# Patient Record
Sex: MALE | Race: BLACK OR AFRICAN AMERICAN | NOT HISPANIC OR LATINO | Employment: OTHER | URBAN - METROPOLITAN AREA
[De-identification: names, ages, dates, MRNs, and addresses within clinical notes are randomized per-mention and may not be internally consistent; named-entity substitution may affect disease eponyms.]

---

## 2021-04-17 ENCOUNTER — HOSPITAL ENCOUNTER (INPATIENT)
Facility: HOSPITAL | Age: 71
LOS: 5 days | Discharge: HOME HEALTH | DRG: 374 | End: 2021-04-22
Attending: EMERGENCY MEDICINE | Admitting: HOSPITALIST
Payer: MEDICARE

## 2021-04-17 DIAGNOSIS — R10.9 ABDOMINAL PAIN: ICD-10-CM

## 2021-04-17 DIAGNOSIS — R07.9 CHEST PAIN: ICD-10-CM

## 2021-04-17 DIAGNOSIS — K55.069 MESENTERIC VEIN THROMBOSIS: ICD-10-CM

## 2021-04-17 DIAGNOSIS — K63.89 COLONIC MASS: ICD-10-CM

## 2021-04-17 DIAGNOSIS — K56.600 SMALL BOWEL OBSTRUCTION, PARTIAL: ICD-10-CM

## 2021-04-17 DIAGNOSIS — R16.0 LIVER MASSES: ICD-10-CM

## 2021-04-17 LAB
AFP SERPL-MCNC: 2.3 NG/ML (ref 0–8.4)
ALBUMIN SERPL BCP-MCNC: 3.1 G/DL (ref 3.5–5.2)
ALP SERPL-CCNC: 70 U/L (ref 55–135)
ALT SERPL W/O P-5'-P-CCNC: 14 U/L (ref 10–44)
ANION GAP SERPL CALC-SCNC: 12 MMOL/L (ref 8–16)
APTT BLDCRRT: 24.9 SEC (ref 21–32)
APTT BLDCRRT: 54 SEC (ref 21–32)
AST SERPL-CCNC: 32 U/L (ref 10–40)
BACTERIA #/AREA URNS AUTO: NORMAL /HPF
BASOPHILS # BLD AUTO: 0.03 K/UL (ref 0–0.2)
BASOPHILS NFR BLD: 0.4 % (ref 0–1.9)
BILIRUB SERPL-MCNC: 0.7 MG/DL (ref 0.1–1)
BILIRUB UR QL STRIP: NEGATIVE
BUN SERPL-MCNC: 15 MG/DL (ref 8–23)
BUN SERPL-MCNC: 16 MG/DL (ref 6–30)
CALCIUM SERPL-MCNC: 8.9 MG/DL (ref 8.7–10.5)
CANCER AG19-9 SERPL-ACNC: <2 U/ML (ref 2–40)
CEA SERPL-MCNC: 57.8 NG/ML (ref 0–5)
CHLORIDE SERPL-SCNC: 101 MMOL/L (ref 95–110)
CHLORIDE SERPL-SCNC: 99 MMOL/L (ref 95–110)
CLARITY UR REFRACT.AUTO: ABNORMAL
CO2 SERPL-SCNC: 25 MMOL/L (ref 23–29)
COLOR UR AUTO: ABNORMAL
CREAT SERPL-MCNC: 0.9 MG/DL (ref 0.5–1.4)
CREAT SERPL-MCNC: 1 MG/DL (ref 0.5–1.4)
DIFFERENTIAL METHOD: ABNORMAL
EOSINOPHIL # BLD AUTO: 0.1 K/UL (ref 0–0.5)
EOSINOPHIL NFR BLD: 1.2 % (ref 0–8)
ERYTHROCYTE [DISTWIDTH] IN BLOOD BY AUTOMATED COUNT: 16.3 % (ref 11.5–14.5)
EST. GFR  (AFRICAN AMERICAN): >60 ML/MIN/1.73 M^2
EST. GFR  (NON AFRICAN AMERICAN): >60 ML/MIN/1.73 M^2
FERRITIN SERPL-MCNC: 59 NG/ML (ref 20–300)
GLUCOSE SERPL-MCNC: 96 MG/DL (ref 70–110)
GLUCOSE SERPL-MCNC: 96 MG/DL (ref 70–110)
GLUCOSE UR QL STRIP: NEGATIVE
HCT VFR BLD AUTO: 34.2 % (ref 40–54)
HCT VFR BLD CALC: 34 %PCV (ref 36–54)
HGB BLD-MCNC: 9.7 G/DL (ref 14–18)
HGB UR QL STRIP: NEGATIVE
HYALINE CASTS UR QL AUTO: 0 /LPF
IMM GRANULOCYTES # BLD AUTO: 0.02 K/UL (ref 0–0.04)
IMM GRANULOCYTES NFR BLD AUTO: 0.2 % (ref 0–0.5)
INR PPP: 1.1 (ref 0.8–1.2)
INR PPP: 1.1 (ref 0.8–1.2)
INR PPP: 1.2 (ref 0.8–1.2)
IRON SERPL-MCNC: 15 UG/DL (ref 45–160)
KETONES UR QL STRIP: ABNORMAL
LEUKOCYTE ESTERASE UR QL STRIP: NEGATIVE
LIPASE SERPL-CCNC: 109 U/L (ref 4–60)
LYMPHOCYTES # BLD AUTO: 2.7 K/UL (ref 1–4.8)
LYMPHOCYTES NFR BLD: 32.7 % (ref 18–48)
MCH RBC QN AUTO: 21.9 PG (ref 27–31)
MCHC RBC AUTO-ENTMCNC: 28.4 G/DL (ref 32–36)
MCV RBC AUTO: 77 FL (ref 82–98)
MICROSCOPIC COMMENT: NORMAL
MONOCYTES # BLD AUTO: 1 K/UL (ref 0.3–1)
MONOCYTES NFR BLD: 12.2 % (ref 4–15)
NEUTROPHILS # BLD AUTO: 4.4 K/UL (ref 1.8–7.7)
NEUTROPHILS NFR BLD: 53.3 % (ref 38–73)
NITRITE UR QL STRIP: NEGATIVE
NRBC BLD-RTO: 0 /100 WBC
PH UR STRIP: 5 [PH] (ref 5–8)
PLATELET # BLD AUTO: 495 K/UL (ref 150–450)
PMV BLD AUTO: 9.9 FL (ref 9.2–12.9)
POC IONIZED CALCIUM: 1.1 MMOL/L (ref 1.06–1.42)
POC TCO2 (MEASURED): 26 MMOL/L (ref 23–29)
POTASSIUM BLD-SCNC: 4.7 MMOL/L (ref 3.5–5.1)
POTASSIUM SERPL-SCNC: 4.9 MMOL/L (ref 3.5–5.1)
PROT SERPL-MCNC: 7.5 G/DL (ref 6–8.4)
PROT UR QL STRIP: ABNORMAL
PROTHROMBIN TIME: 12.1 SEC (ref 9–12.5)
PROTHROMBIN TIME: 12.4 SEC (ref 9–12.5)
PROTHROMBIN TIME: 13 SEC (ref 9–12.5)
RBC # BLD AUTO: 4.42 M/UL (ref 4.6–6.2)
RBC #/AREA URNS AUTO: 0 /HPF (ref 0–4)
SAMPLE: ABNORMAL
SATURATED IRON: 4 % (ref 20–50)
SODIUM BLD-SCNC: 136 MMOL/L (ref 136–145)
SODIUM SERPL-SCNC: 136 MMOL/L (ref 136–145)
SP GR UR STRIP: 1.03 (ref 1–1.03)
TOTAL IRON BINDING CAPACITY: 351 UG/DL (ref 250–450)
TRANSFERRIN SERPL-MCNC: 237 MG/DL (ref 200–375)
TROPONIN I SERPL DL<=0.01 NG/ML-MCNC: 0.01 NG/ML (ref 0–0.03)
URN SPEC COLLECT METH UR: ABNORMAL
WBC # BLD AUTO: 8.31 K/UL (ref 3.9–12.7)
WBC #/AREA URNS AUTO: 0 /HPF (ref 0–5)

## 2021-04-17 PROCEDURE — 25000003 PHARM REV CODE 250: Performed by: STUDENT IN AN ORGANIZED HEALTH CARE EDUCATION/TRAINING PROGRAM

## 2021-04-17 PROCEDURE — 82105 ALPHA-FETOPROTEIN SERUM: CPT | Performed by: STUDENT IN AN ORGANIZED HEALTH CARE EDUCATION/TRAINING PROGRAM

## 2021-04-17 PROCEDURE — 96361 HYDRATE IV INFUSION ADD-ON: CPT

## 2021-04-17 PROCEDURE — 36415 COLL VENOUS BLD VENIPUNCTURE: CPT | Performed by: HOSPITALIST

## 2021-04-17 PROCEDURE — 93005 ELECTROCARDIOGRAM TRACING: CPT

## 2021-04-17 PROCEDURE — 11000001 HC ACUTE MED/SURG PRIVATE ROOM

## 2021-04-17 PROCEDURE — 86301 IMMUNOASSAY TUMOR CA 19-9: CPT | Performed by: STUDENT IN AN ORGANIZED HEALTH CARE EDUCATION/TRAINING PROGRAM

## 2021-04-17 PROCEDURE — 93010 EKG 12-LEAD: ICD-10-PCS | Mod: ,,, | Performed by: INTERNAL MEDICINE

## 2021-04-17 PROCEDURE — 80053 COMPREHEN METABOLIC PANEL: CPT | Performed by: PHYSICIAN ASSISTANT

## 2021-04-17 PROCEDURE — 99223 1ST HOSP IP/OBS HIGH 75: CPT | Mod: ,,, | Performed by: SURGERY

## 2021-04-17 PROCEDURE — 25000003 PHARM REV CODE 250: Performed by: PHYSICIAN ASSISTANT

## 2021-04-17 PROCEDURE — 93010 ELECTROCARDIOGRAM REPORT: CPT | Mod: ,,, | Performed by: INTERNAL MEDICINE

## 2021-04-17 PROCEDURE — 99284 PR EMERGENCY DEPT VISIT,LEVEL IV: ICD-10-PCS | Mod: CS,,, | Performed by: EMERGENCY MEDICINE

## 2021-04-17 PROCEDURE — 63600175 PHARM REV CODE 636 W HCPCS: Performed by: STUDENT IN AN ORGANIZED HEALTH CARE EDUCATION/TRAINING PROGRAM

## 2021-04-17 PROCEDURE — 81001 URINALYSIS AUTO W/SCOPE: CPT | Performed by: PHYSICIAN ASSISTANT

## 2021-04-17 PROCEDURE — 25500020 PHARM REV CODE 255: Performed by: EMERGENCY MEDICINE

## 2021-04-17 PROCEDURE — 83540 ASSAY OF IRON: CPT | Performed by: STUDENT IN AN ORGANIZED HEALTH CARE EDUCATION/TRAINING PROGRAM

## 2021-04-17 PROCEDURE — 99285 EMERGENCY DEPT VISIT HI MDM: CPT | Mod: 25

## 2021-04-17 PROCEDURE — 99284 EMERGENCY DEPT VISIT MOD MDM: CPT | Mod: CS,,, | Performed by: EMERGENCY MEDICINE

## 2021-04-17 PROCEDURE — 25000003 PHARM REV CODE 250

## 2021-04-17 PROCEDURE — 99223 PR INITIAL HOSPITAL CARE,LEVL III: ICD-10-PCS | Mod: ,,, | Performed by: SURGERY

## 2021-04-17 PROCEDURE — 80047 BASIC METABLC PNL IONIZED CA: CPT

## 2021-04-17 PROCEDURE — 85730 THROMBOPLASTIN TIME PARTIAL: CPT | Performed by: PHYSICIAN ASSISTANT

## 2021-04-17 PROCEDURE — 99223 1ST HOSP IP/OBS HIGH 75: CPT | Mod: ,,, | Performed by: HOSPITALIST

## 2021-04-17 PROCEDURE — 85730 THROMBOPLASTIN TIME PARTIAL: CPT | Mod: 91 | Performed by: HOSPITALIST

## 2021-04-17 PROCEDURE — 86803 HEPATITIS C AB TEST: CPT | Performed by: EMERGENCY MEDICINE

## 2021-04-17 PROCEDURE — 96374 THER/PROPH/DIAG INJ IV PUSH: CPT

## 2021-04-17 PROCEDURE — 85025 COMPLETE CBC W/AUTO DIFF WBC: CPT | Performed by: PHYSICIAN ASSISTANT

## 2021-04-17 PROCEDURE — 83690 ASSAY OF LIPASE: CPT | Performed by: PHYSICIAN ASSISTANT

## 2021-04-17 PROCEDURE — 82728 ASSAY OF FERRITIN: CPT | Performed by: STUDENT IN AN ORGANIZED HEALTH CARE EDUCATION/TRAINING PROGRAM

## 2021-04-17 PROCEDURE — 63600175 PHARM REV CODE 636 W HCPCS: Performed by: PHYSICIAN ASSISTANT

## 2021-04-17 PROCEDURE — 99223 PR INITIAL HOSPITAL CARE,LEVL III: ICD-10-PCS | Mod: ,,, | Performed by: HOSPITALIST

## 2021-04-17 PROCEDURE — 84484 ASSAY OF TROPONIN QUANT: CPT | Performed by: PHYSICIAN ASSISTANT

## 2021-04-17 PROCEDURE — 85610 PROTHROMBIN TIME: CPT | Mod: 91 | Performed by: PHYSICIAN ASSISTANT

## 2021-04-17 PROCEDURE — 82378 CARCINOEMBRYONIC ANTIGEN: CPT | Performed by: STUDENT IN AN ORGANIZED HEALTH CARE EDUCATION/TRAINING PROGRAM

## 2021-04-17 PROCEDURE — 85610 PROTHROMBIN TIME: CPT | Mod: 91 | Performed by: STUDENT IN AN ORGANIZED HEALTH CARE EDUCATION/TRAINING PROGRAM

## 2021-04-17 RX ORDER — MORPHINE SULFATE 2 MG/ML
1 INJECTION, SOLUTION INTRAMUSCULAR; INTRAVENOUS ONCE
Status: COMPLETED | OUTPATIENT
Start: 2021-04-17 | End: 2021-04-17

## 2021-04-17 RX ORDER — SODIUM CHLORIDE 0.9 % (FLUSH) 0.9 %
10 SYRINGE (ML) INJECTION
Status: DISCONTINUED | OUTPATIENT
Start: 2021-04-17 | End: 2021-04-22 | Stop reason: HOSPADM

## 2021-04-17 RX ORDER — ONDANSETRON 8 MG/1
8 TABLET, ORALLY DISINTEGRATING ORAL EVERY 8 HOURS PRN
Status: DISCONTINUED | OUTPATIENT
Start: 2021-04-17 | End: 2021-04-17

## 2021-04-17 RX ORDER — TALC
6 POWDER (GRAM) TOPICAL NIGHTLY PRN
Status: CANCELLED | OUTPATIENT
Start: 2021-04-17

## 2021-04-17 RX ORDER — AMOXICILLIN 250 MG
1 CAPSULE ORAL 2 TIMES DAILY
Status: DISCONTINUED | OUTPATIENT
Start: 2021-04-17 | End: 2021-04-22 | Stop reason: HOSPADM

## 2021-04-17 RX ORDER — HYDRALAZINE HYDROCHLORIDE 25 MG/1
25 TABLET, FILM COATED ORAL EVERY 8 HOURS
Status: DISCONTINUED | OUTPATIENT
Start: 2021-04-17 | End: 2021-04-18

## 2021-04-17 RX ORDER — TALC
6 POWDER (GRAM) TOPICAL NIGHTLY PRN
Status: DISCONTINUED | OUTPATIENT
Start: 2021-04-17 | End: 2021-04-22 | Stop reason: HOSPADM

## 2021-04-17 RX ORDER — PROCHLORPERAZINE MALEATE 5 MG
10 TABLET ORAL EVERY 6 HOURS PRN
Status: DISCONTINUED | OUTPATIENT
Start: 2021-04-17 | End: 2021-04-21

## 2021-04-17 RX ORDER — IBUPROFEN 200 MG
16 TABLET ORAL
Status: DISCONTINUED | OUTPATIENT
Start: 2021-04-17 | End: 2021-04-22 | Stop reason: HOSPADM

## 2021-04-17 RX ORDER — HYDRALAZINE HYDROCHLORIDE 20 MG/ML
10 INJECTION INTRAMUSCULAR; INTRAVENOUS EVERY 6 HOURS PRN
Status: DISCONTINUED | OUTPATIENT
Start: 2021-04-17 | End: 2021-04-22 | Stop reason: HOSPADM

## 2021-04-17 RX ORDER — GLUCAGON 1 MG
1 KIT INJECTION
Status: DISCONTINUED | OUTPATIENT
Start: 2021-04-17 | End: 2021-04-22 | Stop reason: HOSPADM

## 2021-04-17 RX ORDER — MAG HYDROX/ALUMINUM HYD/SIMETH 200-200-20
30 SUSPENSION, ORAL (FINAL DOSE FORM) ORAL
Status: COMPLETED | OUTPATIENT
Start: 2021-04-17 | End: 2021-04-17

## 2021-04-17 RX ORDER — ONDANSETRON 8 MG/1
8 TABLET, ORALLY DISINTEGRATING ORAL EVERY 8 HOURS PRN
Status: DISCONTINUED | OUTPATIENT
Start: 2021-04-17 | End: 2021-04-19

## 2021-04-17 RX ORDER — AMLODIPINE BESYLATE 5 MG/1
5 TABLET ORAL DAILY
Status: DISCONTINUED | OUTPATIENT
Start: 2021-04-17 | End: 2021-04-18

## 2021-04-17 RX ORDER — ONDANSETRON 2 MG/ML
8 INJECTION INTRAMUSCULAR; INTRAVENOUS
Status: COMPLETED | OUTPATIENT
Start: 2021-04-17 | End: 2021-04-17

## 2021-04-17 RX ORDER — SODIUM CHLORIDE 0.9 % (FLUSH) 0.9 %
10 SYRINGE (ML) INJECTION
Status: DISCONTINUED | OUTPATIENT
Start: 2021-04-17 | End: 2021-04-17

## 2021-04-17 RX ORDER — SODIUM CHLORIDE 0.9 % (FLUSH) 0.9 %
10 SYRINGE (ML) INJECTION
Status: CANCELLED | OUTPATIENT
Start: 2021-04-17

## 2021-04-17 RX ORDER — HEPARIN SODIUM,PORCINE/D5W 25000/250
0-40 INTRAVENOUS SOLUTION INTRAVENOUS CONTINUOUS
Status: DISCONTINUED | OUTPATIENT
Start: 2021-04-17 | End: 2021-04-20

## 2021-04-17 RX ORDER — IBUPROFEN 200 MG
24 TABLET ORAL
Status: DISCONTINUED | OUTPATIENT
Start: 2021-04-17 | End: 2021-04-22 | Stop reason: HOSPADM

## 2021-04-17 RX ORDER — AMLODIPINE BESYLATE 5 MG/1
5 TABLET ORAL DAILY
Status: DISCONTINUED | OUTPATIENT
Start: 2021-04-18 | End: 2021-04-17

## 2021-04-17 RX ADMIN — IOHEXOL 75 ML: 350 INJECTION, SOLUTION INTRAVENOUS at 12:04

## 2021-04-17 RX ADMIN — HYDRALAZINE HYDROCHLORIDE 25 MG: 25 TABLET, FILM COATED ORAL at 09:04

## 2021-04-17 RX ADMIN — AMLODIPINE BESYLATE 5 MG: 5 TABLET ORAL at 05:04

## 2021-04-17 RX ADMIN — HEPARIN SODIUM AND DEXTROSE 18 UNITS/KG/HR: 10000; 5 INJECTION INTRAVENOUS at 04:04

## 2021-04-17 RX ADMIN — ALUMINUM HYDROXIDE, MAGNESIUM HYDROXIDE, AND SIMETHICONE 30 ML: 200; 200; 20 SUSPENSION ORAL at 10:04

## 2021-04-17 RX ADMIN — MORPHINE SULFATE 1 MG: 2 INJECTION, SOLUTION INTRAMUSCULAR; INTRAVENOUS at 09:04

## 2021-04-17 RX ADMIN — SODIUM CHLORIDE 1000 ML: 0.9 INJECTION, SOLUTION INTRAVENOUS at 10:04

## 2021-04-17 RX ADMIN — DOCUSATE SODIUM 50MG AND SENNOSIDES 8.6MG 1 TABLET: 8.6; 5 TABLET, FILM COATED ORAL at 09:04

## 2021-04-17 RX ADMIN — SODIUM CHLORIDE 1000 ML: 0.9 INJECTION, SOLUTION INTRAVENOUS at 02:04

## 2021-04-17 RX ADMIN — ONDANSETRON 8 MG: 2 INJECTION INTRAMUSCULAR; INTRAVENOUS at 10:04

## 2021-04-18 PROBLEM — K56.609 LARGE BOWEL OBSTRUCTION: Status: ACTIVE | Noted: 2021-04-18

## 2021-04-18 LAB
ALBUMIN SERPL BCP-MCNC: 2.7 G/DL (ref 3.5–5.2)
ALP SERPL-CCNC: 62 U/L (ref 55–135)
ALT SERPL W/O P-5'-P-CCNC: 13 U/L (ref 10–44)
ANION GAP SERPL CALC-SCNC: 13 MMOL/L (ref 8–16)
APTT BLDCRRT: 41.6 SEC (ref 21–32)
APTT BLDCRRT: 41.6 SEC (ref 21–32)
APTT BLDCRRT: 47 SEC (ref 21–32)
AST SERPL-CCNC: 27 U/L (ref 10–40)
BASOPHILS # BLD AUTO: 0.03 K/UL (ref 0–0.2)
BASOPHILS NFR BLD: 0.4 % (ref 0–1.9)
BILIRUB SERPL-MCNC: 0.5 MG/DL (ref 0.1–1)
BUN SERPL-MCNC: 11 MG/DL (ref 8–23)
CALCIUM SERPL-MCNC: 8.3 MG/DL (ref 8.7–10.5)
CHLORIDE SERPL-SCNC: 102 MMOL/L (ref 95–110)
CHOLEST SERPL-MCNC: 144 MG/DL (ref 120–199)
CHOLEST/HDLC SERPL: 4.2 {RATIO} (ref 2–5)
CO2 SERPL-SCNC: 21 MMOL/L (ref 23–29)
CREAT SERPL-MCNC: 0.8 MG/DL (ref 0.5–1.4)
DIFFERENTIAL METHOD: ABNORMAL
EOSINOPHIL # BLD AUTO: 0.1 K/UL (ref 0–0.5)
EOSINOPHIL NFR BLD: 1.1 % (ref 0–8)
ERYTHROCYTE [DISTWIDTH] IN BLOOD BY AUTOMATED COUNT: 16.7 % (ref 11.5–14.5)
EST. GFR  (AFRICAN AMERICAN): >60 ML/MIN/1.73 M^2
EST. GFR  (NON AFRICAN AMERICAN): >60 ML/MIN/1.73 M^2
GLUCOSE SERPL-MCNC: 80 MG/DL (ref 70–110)
HCT VFR BLD AUTO: 31.7 % (ref 40–54)
HDLC SERPL-MCNC: 34 MG/DL (ref 40–75)
HDLC SERPL: 23.6 % (ref 20–50)
HGB BLD-MCNC: 8.9 G/DL (ref 14–18)
IMM GRANULOCYTES # BLD AUTO: 0.02 K/UL (ref 0–0.04)
IMM GRANULOCYTES NFR BLD AUTO: 0.3 % (ref 0–0.5)
INR PPP: 1.1 (ref 0.8–1.2)
LDLC SERPL CALC-MCNC: 97.6 MG/DL (ref 63–159)
LYMPHOCYTES # BLD AUTO: 2.1 K/UL (ref 1–4.8)
LYMPHOCYTES NFR BLD: 29 % (ref 18–48)
MAGNESIUM SERPL-MCNC: 2 MG/DL (ref 1.6–2.6)
MCH RBC QN AUTO: 21.8 PG (ref 27–31)
MCHC RBC AUTO-ENTMCNC: 28.1 G/DL (ref 32–36)
MCV RBC AUTO: 78 FL (ref 82–98)
MONOCYTES # BLD AUTO: 0.9 K/UL (ref 0.3–1)
MONOCYTES NFR BLD: 12.8 % (ref 4–15)
NEUTROPHILS # BLD AUTO: 4.2 K/UL (ref 1.8–7.7)
NEUTROPHILS NFR BLD: 56.4 % (ref 38–73)
NONHDLC SERPL-MCNC: 110 MG/DL
NRBC BLD-RTO: 0 /100 WBC
PHOSPHATE SERPL-MCNC: 2.8 MG/DL (ref 2.7–4.5)
PLATELET # BLD AUTO: 432 K/UL (ref 150–450)
PMV BLD AUTO: 10.8 FL (ref 9.2–12.9)
POTASSIUM SERPL-SCNC: 3.9 MMOL/L (ref 3.5–5.1)
PROT SERPL-MCNC: 6.4 G/DL (ref 6–8.4)
PROTHROMBIN TIME: 12.2 SEC (ref 9–12.5)
RBC # BLD AUTO: 4.09 M/UL (ref 4.6–6.2)
SODIUM SERPL-SCNC: 136 MMOL/L (ref 136–145)
TRIGL SERPL-MCNC: 62 MG/DL (ref 30–150)
WBC # BLD AUTO: 7.37 K/UL (ref 3.9–12.7)

## 2021-04-18 PROCEDURE — 83735 ASSAY OF MAGNESIUM: CPT | Performed by: STUDENT IN AN ORGANIZED HEALTH CARE EDUCATION/TRAINING PROGRAM

## 2021-04-18 PROCEDURE — 99223 1ST HOSP IP/OBS HIGH 75: CPT | Mod: ,,, | Performed by: INTERNAL MEDICINE

## 2021-04-18 PROCEDURE — 85025 COMPLETE CBC W/AUTO DIFF WBC: CPT | Performed by: PHYSICIAN ASSISTANT

## 2021-04-18 PROCEDURE — 80053 COMPREHEN METABOLIC PANEL: CPT | Performed by: STUDENT IN AN ORGANIZED HEALTH CARE EDUCATION/TRAINING PROGRAM

## 2021-04-18 PROCEDURE — 85610 PROTHROMBIN TIME: CPT | Performed by: STUDENT IN AN ORGANIZED HEALTH CARE EDUCATION/TRAINING PROGRAM

## 2021-04-18 PROCEDURE — 25000003 PHARM REV CODE 250: Performed by: STUDENT IN AN ORGANIZED HEALTH CARE EDUCATION/TRAINING PROGRAM

## 2021-04-18 PROCEDURE — 97161 PT EVAL LOW COMPLEX 20 MIN: CPT

## 2021-04-18 PROCEDURE — 80061 LIPID PANEL: CPT | Performed by: STUDENT IN AN ORGANIZED HEALTH CARE EDUCATION/TRAINING PROGRAM

## 2021-04-18 PROCEDURE — 99232 SBSQ HOSP IP/OBS MODERATE 35: CPT | Mod: GC,,, | Performed by: HOSPITALIST

## 2021-04-18 PROCEDURE — 99233 PR SUBSEQUENT HOSPITAL CARE,LEVL III: ICD-10-PCS | Mod: ,,, | Performed by: COLON & RECTAL SURGERY

## 2021-04-18 PROCEDURE — 63600175 PHARM REV CODE 636 W HCPCS: Performed by: PHYSICIAN ASSISTANT

## 2021-04-18 PROCEDURE — 97165 OT EVAL LOW COMPLEX 30 MIN: CPT

## 2021-04-18 PROCEDURE — 84100 ASSAY OF PHOSPHORUS: CPT | Performed by: STUDENT IN AN ORGANIZED HEALTH CARE EDUCATION/TRAINING PROGRAM

## 2021-04-18 PROCEDURE — 99233 SBSQ HOSP IP/OBS HIGH 50: CPT | Mod: ,,, | Performed by: COLON & RECTAL SURGERY

## 2021-04-18 PROCEDURE — 25000003 PHARM REV CODE 250: Performed by: SURGERY

## 2021-04-18 PROCEDURE — 63600175 PHARM REV CODE 636 W HCPCS: Performed by: HOSPITALIST

## 2021-04-18 PROCEDURE — 97530 THERAPEUTIC ACTIVITIES: CPT

## 2021-04-18 PROCEDURE — 99232 PR SUBSEQUENT HOSPITAL CARE,LEVL II: ICD-10-PCS | Mod: GC,,, | Performed by: HOSPITALIST

## 2021-04-18 PROCEDURE — 99223 PR INITIAL HOSPITAL CARE,LEVL III: ICD-10-PCS | Mod: ,,, | Performed by: INTERNAL MEDICINE

## 2021-04-18 PROCEDURE — 99232 SBSQ HOSP IP/OBS MODERATE 35: CPT | Mod: ,,, | Performed by: SURGERY

## 2021-04-18 PROCEDURE — 99232 PR SUBSEQUENT HOSPITAL CARE,LEVL II: ICD-10-PCS | Mod: ,,, | Performed by: SURGERY

## 2021-04-18 PROCEDURE — 36415 COLL VENOUS BLD VENIPUNCTURE: CPT | Performed by: PHYSICIAN ASSISTANT

## 2021-04-18 PROCEDURE — 36415 COLL VENOUS BLD VENIPUNCTURE: CPT | Performed by: HOSPITALIST

## 2021-04-18 PROCEDURE — 85730 THROMBOPLASTIN TIME PARTIAL: CPT | Performed by: PHYSICIAN ASSISTANT

## 2021-04-18 PROCEDURE — 11000001 HC ACUTE MED/SURG PRIVATE ROOM

## 2021-04-18 PROCEDURE — 85730 THROMBOPLASTIN TIME PARTIAL: CPT | Mod: 91 | Performed by: HOSPITALIST

## 2021-04-18 RX ORDER — MORPHINE SULFATE 2 MG/ML
2 INJECTION, SOLUTION INTRAMUSCULAR; INTRAVENOUS EVERY 4 HOURS PRN
Status: DISCONTINUED | OUTPATIENT
Start: 2021-04-18 | End: 2021-04-22 | Stop reason: HOSPADM

## 2021-04-18 RX ORDER — POLYETHYLENE GLYCOL 3350, SODIUM SULFATE ANHYDROUS, SODIUM BICARBONATE, SODIUM CHLORIDE, POTASSIUM CHLORIDE 236; 22.74; 6.74; 5.86; 2.97 G/4L; G/4L; G/4L; G/4L; G/4L
2000 POWDER, FOR SOLUTION ORAL ONCE
Status: COMPLETED | OUTPATIENT
Start: 2021-04-19 | End: 2021-04-19

## 2021-04-18 RX ORDER — POLYETHYLENE GLYCOL 3350, SODIUM SULFATE ANHYDROUS, SODIUM BICARBONATE, SODIUM CHLORIDE, POTASSIUM CHLORIDE 236; 22.74; 6.74; 5.86; 2.97 G/4L; G/4L; G/4L; G/4L; G/4L
2000 POWDER, FOR SOLUTION ORAL ONCE
Status: COMPLETED | OUTPATIENT
Start: 2021-04-18 | End: 2021-04-18

## 2021-04-18 RX ORDER — AMLODIPINE BESYLATE 10 MG/1
10 TABLET ORAL DAILY
Status: DISCONTINUED | OUTPATIENT
Start: 2021-04-19 | End: 2021-04-22 | Stop reason: HOSPADM

## 2021-04-18 RX ORDER — HYDRALAZINE HYDROCHLORIDE 10 MG/1
10 TABLET, FILM COATED ORAL EVERY 8 HOURS
Status: DISCONTINUED | OUTPATIENT
Start: 2021-04-18 | End: 2021-04-20

## 2021-04-18 RX ADMIN — MORPHINE SULFATE 2 MG: 2 INJECTION, SOLUTION INTRAMUSCULAR; INTRAVENOUS at 10:04

## 2021-04-18 RX ADMIN — HYDRALAZINE HYDROCHLORIDE 25 MG: 25 TABLET, FILM COATED ORAL at 02:04

## 2021-04-18 RX ADMIN — HYDRALAZINE HYDROCHLORIDE 25 MG: 25 TABLET, FILM COATED ORAL at 05:04

## 2021-04-18 RX ADMIN — AMLODIPINE BESYLATE 5 MG: 5 TABLET ORAL at 10:04

## 2021-04-18 RX ADMIN — HEPARIN SODIUM AND DEXTROSE 18 UNITS/KG/HR: 10000; 5 INJECTION INTRAVENOUS at 10:04

## 2021-04-18 RX ADMIN — HYDRALAZINE HYDROCHLORIDE 10 MG: 10 TABLET, FILM COATED ORAL at 09:04

## 2021-04-18 RX ADMIN — MORPHINE SULFATE 2 MG: 2 INJECTION, SOLUTION INTRAMUSCULAR; INTRAVENOUS at 07:04

## 2021-04-18 RX ADMIN — POLYETHYLENE GLYCOL 3350, SODIUM SULFATE ANHYDROUS, SODIUM BICARBONATE, SODIUM CHLORIDE, POTASSIUM CHLORIDE 2000 ML: 236; 22.74; 6.74; 5.86; 2.97 POWDER, FOR SOLUTION ORAL at 03:04

## 2021-04-19 ENCOUNTER — ANESTHESIA EVENT (OUTPATIENT)
Dept: ENDOSCOPY | Facility: HOSPITAL | Age: 71
DRG: 374 | End: 2021-04-19
Payer: MEDICARE

## 2021-04-19 PROBLEM — R11.0 NAUSEA: Status: ACTIVE | Noted: 2021-04-19

## 2021-04-19 LAB
ALBUMIN SERPL BCP-MCNC: 2.6 G/DL (ref 3.5–5.2)
ALP SERPL-CCNC: 57 U/L (ref 55–135)
ALT SERPL W/O P-5'-P-CCNC: 10 U/L (ref 10–44)
ANION GAP SERPL CALC-SCNC: 13 MMOL/L (ref 8–16)
ANISOCYTOSIS BLD QL SMEAR: SLIGHT
APTT BLDCRRT: 43.3 SEC (ref 21–32)
AST SERPL-CCNC: 22 U/L (ref 10–40)
BASOPHILS # BLD AUTO: 0.03 K/UL (ref 0–0.2)
BASOPHILS NFR BLD: 0.4 % (ref 0–1.9)
BILIRUB SERPL-MCNC: 0.5 MG/DL (ref 0.1–1)
BUN SERPL-MCNC: 10 MG/DL (ref 8–23)
CALCIUM SERPL-MCNC: 8.4 MG/DL (ref 8.7–10.5)
CHLORIDE SERPL-SCNC: 101 MMOL/L (ref 95–110)
CO2 SERPL-SCNC: 20 MMOL/L (ref 23–29)
CREAT SERPL-MCNC: 0.7 MG/DL (ref 0.5–1.4)
DIFFERENTIAL METHOD: ABNORMAL
EOSINOPHIL # BLD AUTO: 0.1 K/UL (ref 0–0.5)
EOSINOPHIL NFR BLD: 1.1 % (ref 0–8)
ERYTHROCYTE [DISTWIDTH] IN BLOOD BY AUTOMATED COUNT: 16.5 % (ref 11.5–14.5)
EST. GFR  (AFRICAN AMERICAN): >60 ML/MIN/1.73 M^2
EST. GFR  (NON AFRICAN AMERICAN): >60 ML/MIN/1.73 M^2
GLUCOSE SERPL-MCNC: 78 MG/DL (ref 70–110)
HCT VFR BLD AUTO: 30.5 % (ref 40–54)
HCV AB SERPL QL IA: NEGATIVE
HGB BLD-MCNC: 8.9 G/DL (ref 14–18)
IMM GRANULOCYTES # BLD AUTO: 0.02 K/UL (ref 0–0.04)
IMM GRANULOCYTES NFR BLD AUTO: 0.3 % (ref 0–0.5)
INR PPP: 1.2 (ref 0.8–1.2)
LYMPHOCYTES # BLD AUTO: 1.6 K/UL (ref 1–4.8)
LYMPHOCYTES NFR BLD: 22.4 % (ref 18–48)
MAGNESIUM SERPL-MCNC: 1.9 MG/DL (ref 1.6–2.6)
MCH RBC QN AUTO: 22.2 PG (ref 27–31)
MCHC RBC AUTO-ENTMCNC: 29.2 G/DL (ref 32–36)
MCV RBC AUTO: 76 FL (ref 82–98)
MONOCYTES # BLD AUTO: 1 K/UL (ref 0.3–1)
MONOCYTES NFR BLD: 14 % (ref 4–15)
NEUTROPHILS # BLD AUTO: 4.5 K/UL (ref 1.8–7.7)
NEUTROPHILS NFR BLD: 61.8 % (ref 38–73)
NRBC BLD-RTO: 0 /100 WBC
OVALOCYTES BLD QL SMEAR: ABNORMAL
PHOSPHATE SERPL-MCNC: 2.6 MG/DL (ref 2.7–4.5)
PLATELET # BLD AUTO: 442 K/UL (ref 150–450)
PLATELET BLD QL SMEAR: ABNORMAL
PMV BLD AUTO: 10.5 FL (ref 9.2–12.9)
POIKILOCYTOSIS BLD QL SMEAR: SLIGHT
POTASSIUM SERPL-SCNC: 3.8 MMOL/L (ref 3.5–5.1)
PROT SERPL-MCNC: 6.2 G/DL (ref 6–8.4)
PROTHROMBIN TIME: 12.5 SEC (ref 9–12.5)
RBC # BLD AUTO: 4.01 M/UL (ref 4.6–6.2)
SARS-COV-2 RNA RESP QL NAA+PROBE: NOT DETECTED
SODIUM SERPL-SCNC: 134 MMOL/L (ref 136–145)
WBC # BLD AUTO: 7.2 K/UL (ref 3.9–12.7)

## 2021-04-19 PROCEDURE — 25500020 PHARM REV CODE 255: Performed by: HOSPITALIST

## 2021-04-19 PROCEDURE — 99233 PR SUBSEQUENT HOSPITAL CARE,LEVL III: ICD-10-PCS | Mod: GC,,, | Performed by: HOSPITALIST

## 2021-04-19 PROCEDURE — U0005 INFEC AGEN DETEC AMPLI PROBE: HCPCS | Performed by: COLON & RECTAL SURGERY

## 2021-04-19 PROCEDURE — 85610 PROTHROMBIN TIME: CPT | Performed by: STUDENT IN AN ORGANIZED HEALTH CARE EDUCATION/TRAINING PROGRAM

## 2021-04-19 PROCEDURE — 11000001 HC ACUTE MED/SURG PRIVATE ROOM

## 2021-04-19 PROCEDURE — U0003 INFECTIOUS AGENT DETECTION BY NUCLEIC ACID (DNA OR RNA); SEVERE ACUTE RESPIRATORY SYNDROME CORONAVIRUS 2 (SARS-COV-2) (CORONAVIRUS DISEASE [COVID-19]), AMPLIFIED PROBE TECHNIQUE, MAKING USE OF HIGH THROUGHPUT TECHNOLOGIES AS DESCRIBED BY CMS-2020-01-R: HCPCS | Performed by: COLON & RECTAL SURGERY

## 2021-04-19 PROCEDURE — 99222 PR INITIAL HOSPITAL CARE,LEVL II: ICD-10-PCS | Mod: ,,, | Performed by: RADIOLOGY

## 2021-04-19 PROCEDURE — 63600175 PHARM REV CODE 636 W HCPCS: Performed by: PHYSICIAN ASSISTANT

## 2021-04-19 PROCEDURE — 99233 SBSQ HOSP IP/OBS HIGH 50: CPT | Mod: GC,,, | Performed by: HOSPITALIST

## 2021-04-19 PROCEDURE — 84100 ASSAY OF PHOSPHORUS: CPT | Performed by: STUDENT IN AN ORGANIZED HEALTH CARE EDUCATION/TRAINING PROGRAM

## 2021-04-19 PROCEDURE — 83735 ASSAY OF MAGNESIUM: CPT | Performed by: STUDENT IN AN ORGANIZED HEALTH CARE EDUCATION/TRAINING PROGRAM

## 2021-04-19 PROCEDURE — 99222 1ST HOSP IP/OBS MODERATE 55: CPT | Mod: ,,, | Performed by: RADIOLOGY

## 2021-04-19 PROCEDURE — 36415 COLL VENOUS BLD VENIPUNCTURE: CPT | Performed by: PHYSICIAN ASSISTANT

## 2021-04-19 PROCEDURE — 25000003 PHARM REV CODE 250: Performed by: SURGERY

## 2021-04-19 PROCEDURE — 25000003 PHARM REV CODE 250: Performed by: STUDENT IN AN ORGANIZED HEALTH CARE EDUCATION/TRAINING PROGRAM

## 2021-04-19 PROCEDURE — 85730 THROMBOPLASTIN TIME PARTIAL: CPT | Performed by: PHYSICIAN ASSISTANT

## 2021-04-19 PROCEDURE — 63600175 PHARM REV CODE 636 W HCPCS: Performed by: HOSPITALIST

## 2021-04-19 PROCEDURE — 80053 COMPREHEN METABOLIC PANEL: CPT | Performed by: STUDENT IN AN ORGANIZED HEALTH CARE EDUCATION/TRAINING PROGRAM

## 2021-04-19 PROCEDURE — 85025 COMPLETE CBC W/AUTO DIFF WBC: CPT | Performed by: PHYSICIAN ASSISTANT

## 2021-04-19 RX ORDER — ONDANSETRON 2 MG/ML
4 INJECTION INTRAMUSCULAR; INTRAVENOUS EVERY 6 HOURS
Status: DISCONTINUED | OUTPATIENT
Start: 2021-04-19 | End: 2021-04-21

## 2021-04-19 RX ADMIN — DOCUSATE SODIUM 50MG AND SENNOSIDES 8.6MG 1 TABLET: 8.6; 5 TABLET, FILM COATED ORAL at 10:04

## 2021-04-19 RX ADMIN — HEPARIN SODIUM AND DEXTROSE 18 UNITS/KG/HR: 10000; 5 INJECTION INTRAVENOUS at 11:04

## 2021-04-19 RX ADMIN — MELATONIN TAB 3 MG 6 MG: 3 TAB at 09:04

## 2021-04-19 RX ADMIN — ONDANSETRON 4 MG: 2 INJECTION INTRAMUSCULAR; INTRAVENOUS at 03:04

## 2021-04-19 RX ADMIN — HYDRALAZINE HYDROCHLORIDE 10 MG: 10 TABLET, FILM COATED ORAL at 09:04

## 2021-04-19 RX ADMIN — MORPHINE SULFATE 2 MG: 2 INJECTION, SOLUTION INTRAMUSCULAR; INTRAVENOUS at 03:04

## 2021-04-19 RX ADMIN — AMLODIPINE BESYLATE 10 MG: 10 TABLET ORAL at 10:04

## 2021-04-19 RX ADMIN — ONDANSETRON 4 MG: 2 INJECTION INTRAMUSCULAR; INTRAVENOUS at 06:04

## 2021-04-19 RX ADMIN — HYDRALAZINE HYDROCHLORIDE 10 MG: 10 TABLET, FILM COATED ORAL at 02:04

## 2021-04-19 RX ADMIN — ONDANSETRON 4 MG: 2 INJECTION INTRAMUSCULAR; INTRAVENOUS at 11:04

## 2021-04-19 RX ADMIN — POLYETHYLENE GLYCOL 3350, SODIUM SULFATE ANHYDROUS, SODIUM BICARBONATE, SODIUM CHLORIDE, POTASSIUM CHLORIDE 2000 ML: 236; 22.74; 6.74; 5.86; 2.97 POWDER, FOR SOLUTION ORAL at 12:04

## 2021-04-19 RX ADMIN — HYDRALAZINE HYDROCHLORIDE 10 MG: 10 TABLET, FILM COATED ORAL at 05:04

## 2021-04-19 RX ADMIN — DIATRIZOATE MEGLUMINE AND DIATRIZOATE SODIUM 360 ML: 660; 100 LIQUID ORAL; RECTAL at 04:04

## 2021-04-19 RX ADMIN — DOCUSATE SODIUM 50MG AND SENNOSIDES 8.6MG 1 TABLET: 8.6; 5 TABLET, FILM COATED ORAL at 09:04

## 2021-04-19 RX ADMIN — MORPHINE SULFATE 2 MG: 2 INJECTION, SOLUTION INTRAMUSCULAR; INTRAVENOUS at 10:04

## 2021-04-20 ENCOUNTER — ANESTHESIA (OUTPATIENT)
Dept: ENDOSCOPY | Facility: HOSPITAL | Age: 71
DRG: 374 | End: 2021-04-20
Payer: MEDICARE

## 2021-04-20 LAB
ALBUMIN SERPL BCP-MCNC: 3.1 G/DL (ref 3.5–5.2)
ALP SERPL-CCNC: 60 U/L (ref 55–135)
ALT SERPL W/O P-5'-P-CCNC: 10 U/L (ref 10–44)
ANION GAP SERPL CALC-SCNC: 11 MMOL/L (ref 8–16)
ANISOCYTOSIS BLD QL SMEAR: SLIGHT
ANISOCYTOSIS BLD QL SMEAR: SLIGHT
APTT BLDCRRT: 27.1 SEC (ref 21–32)
APTT BLDCRRT: 27.4 SEC (ref 21–32)
APTT BLDCRRT: 33.9 SEC (ref 21–32)
AST SERPL-CCNC: 23 U/L (ref 10–40)
BASOPHILS # BLD AUTO: 0.02 K/UL (ref 0–0.2)
BASOPHILS # BLD AUTO: 0.03 K/UL (ref 0–0.2)
BASOPHILS NFR BLD: 0.2 % (ref 0–1.9)
BASOPHILS NFR BLD: 0.4 % (ref 0–1.9)
BILIRUB SERPL-MCNC: 0.6 MG/DL (ref 0.1–1)
BUN SERPL-MCNC: 14 MG/DL (ref 8–23)
BURR CELLS BLD QL SMEAR: ABNORMAL
CALCIUM SERPL-MCNC: 8.7 MG/DL (ref 8.7–10.5)
CHLORIDE SERPL-SCNC: 97 MMOL/L (ref 95–110)
CO2 SERPL-SCNC: 27 MMOL/L (ref 23–29)
CREAT SERPL-MCNC: 0.9 MG/DL (ref 0.5–1.4)
DIFFERENTIAL METHOD: ABNORMAL
DIFFERENTIAL METHOD: ABNORMAL
EOSINOPHIL # BLD AUTO: 0 K/UL (ref 0–0.5)
EOSINOPHIL # BLD AUTO: 0.1 K/UL (ref 0–0.5)
EOSINOPHIL NFR BLD: 0.2 % (ref 0–8)
EOSINOPHIL NFR BLD: 0.9 % (ref 0–8)
ERYTHROCYTE [DISTWIDTH] IN BLOOD BY AUTOMATED COUNT: 16.6 % (ref 11.5–14.5)
ERYTHROCYTE [DISTWIDTH] IN BLOOD BY AUTOMATED COUNT: 17 % (ref 11.5–14.5)
EST. GFR  (AFRICAN AMERICAN): >60 ML/MIN/1.73 M^2
EST. GFR  (NON AFRICAN AMERICAN): >60 ML/MIN/1.73 M^2
GLUCOSE SERPL-MCNC: 113 MG/DL (ref 70–110)
HCT VFR BLD AUTO: 31 % (ref 40–54)
HCT VFR BLD AUTO: 34 % (ref 40–54)
HGB BLD-MCNC: 9.1 G/DL (ref 14–18)
HGB BLD-MCNC: 9.7 G/DL (ref 14–18)
HYPOCHROMIA BLD QL SMEAR: ABNORMAL
HYPOCHROMIA BLD QL SMEAR: ABNORMAL
IMM GRANULOCYTES # BLD AUTO: 0.02 K/UL (ref 0–0.04)
IMM GRANULOCYTES # BLD AUTO: 0.06 K/UL (ref 0–0.04)
IMM GRANULOCYTES NFR BLD AUTO: 0.3 % (ref 0–0.5)
IMM GRANULOCYTES NFR BLD AUTO: 0.6 % (ref 0–0.5)
INR PPP: 1.1 (ref 0.8–1.2)
INR PPP: 1.2 (ref 0.8–1.2)
LYMPHOCYTES # BLD AUTO: 1.7 K/UL (ref 1–4.8)
LYMPHOCYTES # BLD AUTO: 1.9 K/UL (ref 1–4.8)
LYMPHOCYTES NFR BLD: 18.5 % (ref 18–48)
LYMPHOCYTES NFR BLD: 21.8 % (ref 18–48)
MAGNESIUM SERPL-MCNC: 2.1 MG/DL (ref 1.6–2.6)
MCH RBC QN AUTO: 21.7 PG (ref 27–31)
MCH RBC QN AUTO: 22.2 PG (ref 27–31)
MCHC RBC AUTO-ENTMCNC: 28.5 G/DL (ref 32–36)
MCHC RBC AUTO-ENTMCNC: 29.4 G/DL (ref 32–36)
MCV RBC AUTO: 76 FL (ref 82–98)
MCV RBC AUTO: 76 FL (ref 82–98)
MONOCYTES # BLD AUTO: 0.4 K/UL (ref 0.3–1)
MONOCYTES # BLD AUTO: 1.2 K/UL (ref 0.3–1)
MONOCYTES NFR BLD: 12.2 % (ref 4–15)
MONOCYTES NFR BLD: 4.7 % (ref 4–15)
NEUTROPHILS # BLD AUTO: 5.6 K/UL (ref 1.8–7.7)
NEUTROPHILS # BLD AUTO: 6.9 K/UL (ref 1.8–7.7)
NEUTROPHILS NFR BLD: 68.3 % (ref 38–73)
NEUTROPHILS NFR BLD: 71.9 % (ref 38–73)
NRBC BLD-RTO: 0 /100 WBC
NRBC BLD-RTO: 0 /100 WBC
OVALOCYTES BLD QL SMEAR: ABNORMAL
OVALOCYTES BLD QL SMEAR: ABNORMAL
PHOSPHATE SERPL-MCNC: 2.6 MG/DL (ref 2.7–4.5)
PLATELET # BLD AUTO: 441 K/UL (ref 150–450)
PLATELET # BLD AUTO: 507 K/UL (ref 150–450)
PLATELET BLD QL SMEAR: ABNORMAL
PLATELET BLD QL SMEAR: ABNORMAL
PMV BLD AUTO: 10.1 FL (ref 9.2–12.9)
PMV BLD AUTO: 11.1 FL (ref 9.2–12.9)
POIKILOCYTOSIS BLD QL SMEAR: SLIGHT
POIKILOCYTOSIS BLD QL SMEAR: SLIGHT
POTASSIUM SERPL-SCNC: 3.8 MMOL/L (ref 3.5–5.1)
PROT SERPL-MCNC: 6.9 G/DL (ref 6–8.4)
PROTHROMBIN TIME: 12.4 SEC (ref 9–12.5)
PROTHROMBIN TIME: 13 SEC (ref 9–12.5)
RBC # BLD AUTO: 4.1 M/UL (ref 4.6–6.2)
RBC # BLD AUTO: 4.48 M/UL (ref 4.6–6.2)
SCHISTOCYTES BLD QL SMEAR: ABNORMAL
SODIUM SERPL-SCNC: 135 MMOL/L (ref 136–145)
WBC # BLD AUTO: 10.09 K/UL (ref 3.9–12.7)
WBC # BLD AUTO: 7.81 K/UL (ref 3.9–12.7)

## 2021-04-20 PROCEDURE — D9220A PRA ANESTHESIA: ICD-10-PCS | Mod: CRNA,,, | Performed by: NURSE ANESTHETIST, CERTIFIED REGISTERED

## 2021-04-20 PROCEDURE — 88360 TUMOR IMMUNOHISTOCHEM/MANUAL: CPT | Performed by: PATHOLOGY

## 2021-04-20 PROCEDURE — 85730 THROMBOPLASTIN TIME PARTIAL: CPT | Performed by: PHYSICIAN ASSISTANT

## 2021-04-20 PROCEDURE — 27202304 HC CANNULA, ERCP: Performed by: COLON & RECTAL SURGERY

## 2021-04-20 PROCEDURE — 81210 BRAF GENE: CPT | Performed by: PATHOLOGY

## 2021-04-20 PROCEDURE — C1769 GUIDE WIRE: HCPCS | Performed by: COLON & RECTAL SURGERY

## 2021-04-20 PROCEDURE — 85025 COMPLETE CBC W/AUTO DIFF WBC: CPT | Performed by: PHYSICIAN ASSISTANT

## 2021-04-20 PROCEDURE — 25000003 PHARM REV CODE 250: Performed by: STUDENT IN AN ORGANIZED HEALTH CARE EDUCATION/TRAINING PROGRAM

## 2021-04-20 PROCEDURE — 99233 PR SUBSEQUENT HOSPITAL CARE,LEVL III: ICD-10-PCS | Mod: GC,,, | Performed by: HOSPITALIST

## 2021-04-20 PROCEDURE — 63600175 PHARM REV CODE 636 W HCPCS: Performed by: NURSE ANESTHETIST, CERTIFIED REGISTERED

## 2021-04-20 PROCEDURE — 85730 THROMBOPLASTIN TIME PARTIAL: CPT | Mod: 91 | Performed by: STUDENT IN AN ORGANIZED HEALTH CARE EDUCATION/TRAINING PROGRAM

## 2021-04-20 PROCEDURE — 11000001 HC ACUTE MED/SURG PRIVATE ROOM

## 2021-04-20 PROCEDURE — 88341 PR IHC OR ICC EACH ADD'L SINGLE ANTIBODY  STAINPR: ICD-10-PCS | Mod: 26,,, | Performed by: PATHOLOGY

## 2021-04-20 PROCEDURE — 85610 PROTHROMBIN TIME: CPT | Mod: 91 | Performed by: HOSPITALIST

## 2021-04-20 PROCEDURE — 81311 NRAS GENE VARIANTS EXON 2&3: CPT | Performed by: PATHOLOGY

## 2021-04-20 PROCEDURE — 88341 IMHCHEM/IMCYTCHM EA ADD ANTB: CPT | Mod: 26,,, | Performed by: PATHOLOGY

## 2021-04-20 PROCEDURE — 99233 SBSQ HOSP IP/OBS HIGH 50: CPT | Mod: GC,,, | Performed by: HOSPITALIST

## 2021-04-20 PROCEDURE — 27201012 HC FORCEPS, HOT/COLD, DISP: Performed by: COLON & RECTAL SURGERY

## 2021-04-20 PROCEDURE — 63600175 PHARM REV CODE 636 W HCPCS: Performed by: HOSPITALIST

## 2021-04-20 PROCEDURE — 88305 TISSUE EXAM BY PATHOLOGIST: CPT | Mod: 26,,, | Performed by: PATHOLOGY

## 2021-04-20 PROCEDURE — 83735 ASSAY OF MAGNESIUM: CPT | Performed by: STUDENT IN AN ORGANIZED HEALTH CARE EDUCATION/TRAINING PROGRAM

## 2021-04-20 PROCEDURE — 88305 TISSUE EXAM BY PATHOLOGIST: ICD-10-PCS | Mod: 26,,, | Performed by: PATHOLOGY

## 2021-04-20 PROCEDURE — 37000009 HC ANESTHESIA EA ADD 15 MINS: Performed by: COLON & RECTAL SURGERY

## 2021-04-20 PROCEDURE — 36415 COLL VENOUS BLD VENIPUNCTURE: CPT | Performed by: STUDENT IN AN ORGANIZED HEALTH CARE EDUCATION/TRAINING PROGRAM

## 2021-04-20 PROCEDURE — 88342 IMHCHEM/IMCYTCHM 1ST ANTB: CPT | Mod: 26,,, | Performed by: PATHOLOGY

## 2021-04-20 PROCEDURE — 88342 IMHCHEM/IMCYTCHM 1ST ANTB: CPT | Performed by: PATHOLOGY

## 2021-04-20 PROCEDURE — 25000003 PHARM REV CODE 250: Performed by: ANESTHESIOLOGY

## 2021-04-20 PROCEDURE — 80053 COMPREHEN METABOLIC PANEL: CPT | Performed by: STUDENT IN AN ORGANIZED HEALTH CARE EDUCATION/TRAINING PROGRAM

## 2021-04-20 PROCEDURE — 37000008 HC ANESTHESIA 1ST 15 MINUTES: Performed by: COLON & RECTAL SURGERY

## 2021-04-20 PROCEDURE — 36415 COLL VENOUS BLD VENIPUNCTURE: CPT | Performed by: PHYSICIAN ASSISTANT

## 2021-04-20 PROCEDURE — 81403 MOPATH PROCEDURE LEVEL 4: CPT | Performed by: PATHOLOGY

## 2021-04-20 PROCEDURE — 36415 COLL VENOUS BLD VENIPUNCTURE: CPT | Performed by: HOSPITALIST

## 2021-04-20 PROCEDURE — D9220A PRA ANESTHESIA: Mod: ANES,,, | Performed by: ANESTHESIOLOGY

## 2021-04-20 PROCEDURE — C1874 STENT, COATED/COV W/DEL SYS: HCPCS | Performed by: COLON & RECTAL SURGERY

## 2021-04-20 PROCEDURE — 84100 ASSAY OF PHOSPHORUS: CPT | Performed by: STUDENT IN AN ORGANIZED HEALTH CARE EDUCATION/TRAINING PROGRAM

## 2021-04-20 PROCEDURE — 25500020 PHARM REV CODE 255: Performed by: COLON & RECTAL SURGERY

## 2021-04-20 PROCEDURE — 25000003 PHARM REV CODE 250: Performed by: NURSE ANESTHETIST, CERTIFIED REGISTERED

## 2021-04-20 PROCEDURE — 88305 TISSUE EXAM BY PATHOLOGIST: CPT | Performed by: PATHOLOGY

## 2021-04-20 PROCEDURE — 88381 MICRODISSECTION MANUAL: CPT | Performed by: PATHOLOGY

## 2021-04-20 PROCEDURE — 85730 THROMBOPLASTIN TIME PARTIAL: CPT | Mod: 91 | Performed by: HOSPITALIST

## 2021-04-20 PROCEDURE — 45389 COLONOSCOPY W/STENT PLCMT: CPT | Performed by: COLON & RECTAL SURGERY

## 2021-04-20 PROCEDURE — 45389 COLONOSCOPY W/STENT PLCMT: CPT | Mod: 52,,, | Performed by: COLON & RECTAL SURGERY

## 2021-04-20 PROCEDURE — D9220A PRA ANESTHESIA: ICD-10-PCS | Mod: ANES,,, | Performed by: ANESTHESIOLOGY

## 2021-04-20 PROCEDURE — 85610 PROTHROMBIN TIME: CPT | Performed by: STUDENT IN AN ORGANIZED HEALTH CARE EDUCATION/TRAINING PROGRAM

## 2021-04-20 PROCEDURE — 85025 COMPLETE CBC W/AUTO DIFF WBC: CPT | Mod: 91 | Performed by: HOSPITALIST

## 2021-04-20 PROCEDURE — 88342 CHG IMMUNOCYTOCHEMISTRY: ICD-10-PCS | Mod: 26,,, | Performed by: PATHOLOGY

## 2021-04-20 PROCEDURE — D9220A PRA ANESTHESIA: Mod: CRNA,,, | Performed by: NURSE ANESTHETIST, CERTIFIED REGISTERED

## 2021-04-20 PROCEDURE — 81275 KRAS GENE VARIANTS EXON 2: CPT | Performed by: PATHOLOGY

## 2021-04-20 PROCEDURE — 45389: ICD-10-PCS | Mod: 52,,, | Performed by: COLON & RECTAL SURGERY

## 2021-04-20 PROCEDURE — 88341 IMHCHEM/IMCYTCHM EA ADD ANTB: CPT | Mod: 59 | Performed by: PATHOLOGY

## 2021-04-20 DEVICE — STENT SYSTEM WITH ANCHOR LOCK DELIVERY SYSTEM
Type: IMPLANTABLE DEVICE | Site: ABDOMEN | Status: FUNCTIONAL
Brand: WALLFLEX™ COLONIC

## 2021-04-20 RX ORDER — PROPOFOL 10 MG/ML
VIAL (ML) INTRAVENOUS
Status: DISCONTINUED | OUTPATIENT
Start: 2021-04-20 | End: 2021-04-20

## 2021-04-20 RX ORDER — MIDAZOLAM HYDROCHLORIDE 1 MG/ML
INJECTION, SOLUTION INTRAMUSCULAR; INTRAVENOUS
Status: DISCONTINUED | OUTPATIENT
Start: 2021-04-20 | End: 2021-04-20

## 2021-04-20 RX ORDER — ONDANSETRON 2 MG/ML
INJECTION INTRAMUSCULAR; INTRAVENOUS
Status: DISCONTINUED | OUTPATIENT
Start: 2021-04-20 | End: 2021-04-20

## 2021-04-20 RX ORDER — SUCCINYLCHOLINE CHLORIDE 20 MG/ML
INJECTION INTRAMUSCULAR; INTRAVENOUS
Status: DISCONTINUED | OUTPATIENT
Start: 2021-04-20 | End: 2021-04-20

## 2021-04-20 RX ORDER — ROCURONIUM BROMIDE 10 MG/ML
INJECTION, SOLUTION INTRAVENOUS
Status: DISCONTINUED | OUTPATIENT
Start: 2021-04-20 | End: 2021-04-20

## 2021-04-20 RX ORDER — HEPARIN SODIUM,PORCINE/D5W 25000/250
0-40 INTRAVENOUS SOLUTION INTRAVENOUS CONTINUOUS
Status: DISCONTINUED | OUTPATIENT
Start: 2021-04-20 | End: 2021-04-20

## 2021-04-20 RX ORDER — SODIUM CHLORIDE 0.9 % (FLUSH) 0.9 %
3 SYRINGE (ML) INJECTION
Status: DISCONTINUED | OUTPATIENT
Start: 2021-04-20 | End: 2021-04-20 | Stop reason: HOSPADM

## 2021-04-20 RX ORDER — LIDOCAINE HYDROCHLORIDE 20 MG/ML
INJECTION INTRAVENOUS
Status: DISCONTINUED | OUTPATIENT
Start: 2021-04-20 | End: 2021-04-20

## 2021-04-20 RX ORDER — SODIUM CHLORIDE 9 MG/ML
INJECTION, SOLUTION INTRAVENOUS CONTINUOUS
Status: DISCONTINUED | OUTPATIENT
Start: 2021-04-20 | End: 2021-04-22 | Stop reason: HOSPADM

## 2021-04-20 RX ORDER — HEPARIN SODIUM,PORCINE/D5W 25000/250
0-40 INTRAVENOUS SOLUTION INTRAVENOUS CONTINUOUS
Status: DISPENSED | OUTPATIENT
Start: 2021-04-20 | End: 2021-04-21

## 2021-04-20 RX ORDER — FENTANYL CITRATE 50 UG/ML
INJECTION, SOLUTION INTRAMUSCULAR; INTRAVENOUS
Status: DISCONTINUED | OUTPATIENT
Start: 2021-04-20 | End: 2021-04-20

## 2021-04-20 RX ADMIN — AMLODIPINE BESYLATE 10 MG: 10 TABLET ORAL at 09:04

## 2021-04-20 RX ADMIN — SUCCINYLCHOLINE CHLORIDE 100 MG: 20 INJECTION, SOLUTION INTRAMUSCULAR; INTRAVENOUS at 03:04

## 2021-04-20 RX ADMIN — LIDOCAINE HYDROCHLORIDE 60 MG: 20 INJECTION, SOLUTION INTRAVENOUS at 03:04

## 2021-04-20 RX ADMIN — ROCURONIUM BROMIDE 10 MG: 10 INJECTION, SOLUTION INTRAVENOUS at 03:04

## 2021-04-20 RX ADMIN — MELATONIN TAB 3 MG 6 MG: 3 TAB at 08:04

## 2021-04-20 RX ADMIN — MIDAZOLAM HYDROCHLORIDE 2 MG: 1 INJECTION, SOLUTION INTRAMUSCULAR; INTRAVENOUS at 03:04

## 2021-04-20 RX ADMIN — ONDANSETRON 4 MG: 2 INJECTION INTRAMUSCULAR; INTRAVENOUS at 06:04

## 2021-04-20 RX ADMIN — ONDANSETRON 4 MG: 2 INJECTION, SOLUTION INTRAMUSCULAR; INTRAVENOUS at 03:04

## 2021-04-20 RX ADMIN — MORPHINE SULFATE 2 MG: 2 INJECTION, SOLUTION INTRAMUSCULAR; INTRAVENOUS at 09:04

## 2021-04-20 RX ADMIN — FENTANYL CITRATE 50 MCG: 50 INJECTION, SOLUTION INTRAMUSCULAR; INTRAVENOUS at 03:04

## 2021-04-20 RX ADMIN — HEPARIN SODIUM AND DEXTROSE 18 UNITS/KG/HR: 10000; 5 INJECTION INTRAVENOUS at 10:04

## 2021-04-20 RX ADMIN — SODIUM CHLORIDE: 0.9 INJECTION, SOLUTION INTRAVENOUS at 03:04

## 2021-04-20 RX ADMIN — IOHEXOL 20 ML: 300 INJECTION, SOLUTION INTRAVENOUS at 04:04

## 2021-04-20 RX ADMIN — ONDANSETRON 4 MG: 2 INJECTION INTRAMUSCULAR; INTRAVENOUS at 01:04

## 2021-04-20 RX ADMIN — DOCUSATE SODIUM 50MG AND SENNOSIDES 8.6MG 1 TABLET: 8.6; 5 TABLET, FILM COATED ORAL at 08:04

## 2021-04-20 RX ADMIN — HEPARIN SODIUM AND DEXTROSE 18 UNITS/KG/HR: 10000; 5 INJECTION INTRAVENOUS at 11:04

## 2021-04-20 RX ADMIN — PROPOFOL 100 MG: 10 INJECTION, EMULSION INTRAVENOUS at 03:04

## 2021-04-20 RX ADMIN — PROPOFOL 50 MG: 10 INJECTION, EMULSION INTRAVENOUS at 03:04

## 2021-04-20 RX ADMIN — SODIUM CHLORIDE: 0.9 INJECTION, SOLUTION INTRAVENOUS at 11:04

## 2021-04-21 ENCOUNTER — ANTI-COAG VISIT (OUTPATIENT)
Dept: CARDIOLOGY | Facility: CLINIC | Age: 71
End: 2021-04-21

## 2021-04-21 DIAGNOSIS — K56.600 SMALL BOWEL OBSTRUCTION, PARTIAL: ICD-10-CM

## 2021-04-21 DIAGNOSIS — K55.069 MESENTERIC VEIN THROMBOSIS: Primary | ICD-10-CM

## 2021-04-21 LAB
ALBUMIN SERPL BCP-MCNC: 2.7 G/DL (ref 3.5–5.2)
ALP SERPL-CCNC: 58 U/L (ref 55–135)
ALT SERPL W/O P-5'-P-CCNC: 16 U/L (ref 10–44)
ANION GAP SERPL CALC-SCNC: 9 MMOL/L (ref 8–16)
ANISOCYTOSIS BLD QL SMEAR: SLIGHT
APTT BLDCRRT: 37.6 SEC (ref 21–32)
APTT BLDCRRT: 49 SEC (ref 21–32)
AST SERPL-CCNC: 42 U/L (ref 10–40)
BASOPHILS # BLD AUTO: 0.03 K/UL (ref 0–0.2)
BASOPHILS NFR BLD: 0.3 % (ref 0–1.9)
BILIRUB SERPL-MCNC: 0.6 MG/DL (ref 0.1–1)
BUN SERPL-MCNC: 12 MG/DL (ref 8–23)
BURR CELLS BLD QL SMEAR: ABNORMAL
CALCIUM SERPL-MCNC: 7.9 MG/DL (ref 8.7–10.5)
CHLORIDE SERPL-SCNC: 105 MMOL/L (ref 95–110)
CO2 SERPL-SCNC: 24 MMOL/L (ref 23–29)
CREAT SERPL-MCNC: 0.9 MG/DL (ref 0.5–1.4)
DIFFERENTIAL METHOD: ABNORMAL
EOSINOPHIL # BLD AUTO: 0.1 K/UL (ref 0–0.5)
EOSINOPHIL NFR BLD: 0.8 % (ref 0–8)
ERYTHROCYTE [DISTWIDTH] IN BLOOD BY AUTOMATED COUNT: 16.9 % (ref 11.5–14.5)
EST. GFR  (AFRICAN AMERICAN): >60 ML/MIN/1.73 M^2
EST. GFR  (NON AFRICAN AMERICAN): >60 ML/MIN/1.73 M^2
GLUCOSE SERPL-MCNC: 94 MG/DL (ref 70–110)
HCT VFR BLD AUTO: 28.9 % (ref 40–54)
HGB BLD-MCNC: 8.4 G/DL (ref 14–18)
IMM GRANULOCYTES # BLD AUTO: 0.04 K/UL (ref 0–0.04)
IMM GRANULOCYTES NFR BLD AUTO: 0.4 % (ref 0–0.5)
LYMPHOCYTES # BLD AUTO: 2.4 K/UL (ref 1–4.8)
LYMPHOCYTES NFR BLD: 21.7 % (ref 18–48)
MAGNESIUM SERPL-MCNC: 1.8 MG/DL (ref 1.6–2.6)
MCH RBC QN AUTO: 21.8 PG (ref 27–31)
MCHC RBC AUTO-ENTMCNC: 29.1 G/DL (ref 32–36)
MCV RBC AUTO: 75 FL (ref 82–98)
MONOCYTES # BLD AUTO: 1.3 K/UL (ref 0.3–1)
MONOCYTES NFR BLD: 12.1 % (ref 4–15)
NEUTROPHILS # BLD AUTO: 7.2 K/UL (ref 1.8–7.7)
NEUTROPHILS NFR BLD: 64.7 % (ref 38–73)
NRBC BLD-RTO: 0 /100 WBC
OVALOCYTES BLD QL SMEAR: ABNORMAL
PHOSPHATE SERPL-MCNC: 2.3 MG/DL (ref 2.7–4.5)
PLATELET # BLD AUTO: 412 K/UL (ref 150–450)
PMV BLD AUTO: 10.3 FL (ref 9.2–12.9)
POIKILOCYTOSIS BLD QL SMEAR: SLIGHT
POLYCHROMASIA BLD QL SMEAR: ABNORMAL
POTASSIUM SERPL-SCNC: 3.9 MMOL/L (ref 3.5–5.1)
PROT SERPL-MCNC: 6.2 G/DL (ref 6–8.4)
RBC # BLD AUTO: 3.85 M/UL (ref 4.6–6.2)
SODIUM SERPL-SCNC: 138 MMOL/L (ref 136–145)
WBC # BLD AUTO: 11.12 K/UL (ref 3.9–12.7)

## 2021-04-21 PROCEDURE — 99233 SBSQ HOSP IP/OBS HIGH 50: CPT | Mod: GC,,, | Performed by: HOSPITALIST

## 2021-04-21 PROCEDURE — 36415 COLL VENOUS BLD VENIPUNCTURE: CPT | Performed by: HOSPITALIST

## 2021-04-21 PROCEDURE — 11000001 HC ACUTE MED/SURG PRIVATE ROOM

## 2021-04-21 PROCEDURE — 63600175 PHARM REV CODE 636 W HCPCS: Performed by: STUDENT IN AN ORGANIZED HEALTH CARE EDUCATION/TRAINING PROGRAM

## 2021-04-21 PROCEDURE — 85730 THROMBOPLASTIN TIME PARTIAL: CPT | Performed by: HOSPITALIST

## 2021-04-21 PROCEDURE — 25000003 PHARM REV CODE 250: Performed by: STUDENT IN AN ORGANIZED HEALTH CARE EDUCATION/TRAINING PROGRAM

## 2021-04-21 PROCEDURE — 99222 1ST HOSP IP/OBS MODERATE 55: CPT | Mod: ,,, | Performed by: SURGERY

## 2021-04-21 PROCEDURE — 85025 COMPLETE CBC W/AUTO DIFF WBC: CPT | Performed by: HOSPITALIST

## 2021-04-21 PROCEDURE — 99233 PR SUBSEQUENT HOSPITAL CARE,LEVL III: ICD-10-PCS | Mod: GC,,, | Performed by: HOSPITALIST

## 2021-04-21 PROCEDURE — 99222 PR INITIAL HOSPITAL CARE,LEVL II: ICD-10-PCS | Mod: ,,, | Performed by: SURGERY

## 2021-04-21 PROCEDURE — 36415 COLL VENOUS BLD VENIPUNCTURE: CPT | Performed by: STUDENT IN AN ORGANIZED HEALTH CARE EDUCATION/TRAINING PROGRAM

## 2021-04-21 PROCEDURE — 84100 ASSAY OF PHOSPHORUS: CPT | Performed by: STUDENT IN AN ORGANIZED HEALTH CARE EDUCATION/TRAINING PROGRAM

## 2021-04-21 PROCEDURE — 80053 COMPREHEN METABOLIC PANEL: CPT | Performed by: STUDENT IN AN ORGANIZED HEALTH CARE EDUCATION/TRAINING PROGRAM

## 2021-04-21 PROCEDURE — 83735 ASSAY OF MAGNESIUM: CPT | Performed by: STUDENT IN AN ORGANIZED HEALTH CARE EDUCATION/TRAINING PROGRAM

## 2021-04-21 PROCEDURE — 63600175 PHARM REV CODE 636 W HCPCS: Performed by: HOSPITALIST

## 2021-04-21 RX ORDER — WARFARIN SODIUM 5 MG/1
5 TABLET ORAL DAILY
Status: DISCONTINUED | OUTPATIENT
Start: 2021-04-21 | End: 2021-04-22 | Stop reason: HOSPADM

## 2021-04-21 RX ORDER — ONDANSETRON 4 MG/1
4 TABLET, FILM COATED ORAL EVERY 8 HOURS PRN
Qty: 30 TABLET | Refills: 3 | Status: SHIPPED | OUTPATIENT
Start: 2021-04-21 | End: 2023-06-08 | Stop reason: SDUPTHER

## 2021-04-21 RX ORDER — ENOXAPARIN SODIUM 100 MG/ML
1 INJECTION SUBCUTANEOUS
Status: DISCONTINUED | OUTPATIENT
Start: 2021-04-21 | End: 2021-04-22 | Stop reason: HOSPADM

## 2021-04-21 RX ORDER — POLYETHYLENE GLYCOL 3350 17 G/17G
17 POWDER, FOR SOLUTION ORAL DAILY
Status: DISCONTINUED | OUTPATIENT
Start: 2021-04-21 | End: 2021-04-22 | Stop reason: HOSPADM

## 2021-04-21 RX ORDER — SODIUM,POTASSIUM PHOSPHATES 280-250MG
2 POWDER IN PACKET (EA) ORAL EVERY 4 HOURS
Status: COMPLETED | OUTPATIENT
Start: 2021-04-21 | End: 2021-04-21

## 2021-04-21 RX ORDER — WARFARIN SODIUM 5 MG/1
5 TABLET ORAL DAILY
Qty: 30 TABLET | Refills: 3 | Status: SHIPPED | OUTPATIENT
Start: 2021-04-21 | End: 2021-05-20 | Stop reason: SDUPTHER

## 2021-04-21 RX ORDER — AMLODIPINE BESYLATE 10 MG/1
10 TABLET ORAL DAILY
Qty: 30 TABLET | Refills: 3 | Status: SHIPPED | OUTPATIENT
Start: 2021-04-22 | End: 2021-05-20 | Stop reason: SDUPTHER

## 2021-04-21 RX ORDER — ENOXAPARIN SODIUM 100 MG/ML
1 INJECTION SUBCUTANEOUS EVERY 12 HOURS
Qty: 6 ML | Refills: 0 | Status: SHIPPED | OUTPATIENT
Start: 2021-04-21 | End: 2021-04-27

## 2021-04-21 RX ORDER — ONDANSETRON 2 MG/ML
4 INJECTION INTRAMUSCULAR; INTRAVENOUS EVERY 6 HOURS PRN
Status: DISCONTINUED | OUTPATIENT
Start: 2021-04-21 | End: 2021-04-22 | Stop reason: HOSPADM

## 2021-04-21 RX ADMIN — POTASSIUM & SODIUM PHOSPHATES POWDER PACK 280-160-250 MG 2 PACKET: 280-160-250 PACK at 10:04

## 2021-04-21 RX ADMIN — ONDANSETRON 4 MG: 2 INJECTION INTRAMUSCULAR; INTRAVENOUS at 12:04

## 2021-04-21 RX ADMIN — AMLODIPINE BESYLATE 10 MG: 10 TABLET ORAL at 08:04

## 2021-04-21 RX ADMIN — DOCUSATE SODIUM 50MG AND SENNOSIDES 8.6MG 1 TABLET: 8.6; 5 TABLET, FILM COATED ORAL at 08:04

## 2021-04-21 RX ADMIN — WARFARIN SODIUM 5 MG: 5 TABLET ORAL at 04:04

## 2021-04-21 RX ADMIN — HEPARIN SODIUM AND DEXTROSE 20 UNITS/KG/HR: 10000; 5 INJECTION INTRAVENOUS at 08:04

## 2021-04-21 RX ADMIN — POTASSIUM & SODIUM PHOSPHATES POWDER PACK 280-160-250 MG 2 PACKET: 280-160-250 PACK at 01:04

## 2021-04-21 RX ADMIN — ENOXAPARIN SODIUM 60 MG: 60 INJECTION SUBCUTANEOUS at 08:04

## 2021-04-21 RX ADMIN — ONDANSETRON 4 MG: 2 INJECTION INTRAMUSCULAR; INTRAVENOUS at 05:04

## 2021-04-22 ENCOUNTER — NURSE TRIAGE (OUTPATIENT)
Dept: ADMINISTRATIVE | Facility: CLINIC | Age: 71
End: 2021-04-22

## 2021-04-22 VITALS
DIASTOLIC BLOOD PRESSURE: 83 MMHG | WEIGHT: 125 LBS | RESPIRATION RATE: 15 BRPM | SYSTOLIC BLOOD PRESSURE: 127 MMHG | HEART RATE: 82 BPM | BODY MASS INDEX: 19.62 KG/M2 | HEIGHT: 67 IN | OXYGEN SATURATION: 97 % | TEMPERATURE: 97 F

## 2021-04-22 DIAGNOSIS — C18.9 METASTATIC COLON CANCER TO LIVER: ICD-10-CM

## 2021-04-22 DIAGNOSIS — C78.7 METASTATIC COLON CANCER TO LIVER: ICD-10-CM

## 2021-04-22 DIAGNOSIS — C78.7 METASTATIC COLON CANCER TO LIVER: Primary | ICD-10-CM

## 2021-04-22 DIAGNOSIS — C18.9 METASTATIC COLON CANCER TO LIVER: Primary | ICD-10-CM

## 2021-04-22 DIAGNOSIS — D50.0 IRON DEFICIENCY ANEMIA DUE TO CHRONIC BLOOD LOSS: Primary | ICD-10-CM

## 2021-04-22 LAB
ALBUMIN SERPL BCP-MCNC: 2.5 G/DL (ref 3.5–5.2)
ALP SERPL-CCNC: 58 U/L (ref 55–135)
ALT SERPL W/O P-5'-P-CCNC: 23 U/L (ref 10–44)
ANION GAP SERPL CALC-SCNC: 7 MMOL/L (ref 8–16)
APTT BLDCRRT: 28.8 SEC (ref 21–32)
AST SERPL-CCNC: 45 U/L (ref 10–40)
BASOPHILS # BLD AUTO: 0.02 K/UL (ref 0–0.2)
BASOPHILS NFR BLD: 0.2 % (ref 0–1.9)
BILIRUB SERPL-MCNC: 0.3 MG/DL (ref 0.1–1)
BUN SERPL-MCNC: 10 MG/DL (ref 8–23)
CALCIUM SERPL-MCNC: 7.8 MG/DL (ref 8.7–10.5)
CHLORIDE SERPL-SCNC: 104 MMOL/L (ref 95–110)
CO2 SERPL-SCNC: 27 MMOL/L (ref 23–29)
CREAT SERPL-MCNC: 0.8 MG/DL (ref 0.5–1.4)
DIFFERENTIAL METHOD: ABNORMAL
EOSINOPHIL # BLD AUTO: 0.2 K/UL (ref 0–0.5)
EOSINOPHIL NFR BLD: 1.6 % (ref 0–8)
ERYTHROCYTE [DISTWIDTH] IN BLOOD BY AUTOMATED COUNT: 16.9 % (ref 11.5–14.5)
EST. GFR  (AFRICAN AMERICAN): >60 ML/MIN/1.73 M^2
EST. GFR  (NON AFRICAN AMERICAN): >60 ML/MIN/1.73 M^2
GLUCOSE SERPL-MCNC: 91 MG/DL (ref 70–110)
HCT VFR BLD AUTO: 26.7 % (ref 40–54)
HGB BLD-MCNC: 7.7 G/DL (ref 14–18)
IMM GRANULOCYTES # BLD AUTO: 0.04 K/UL (ref 0–0.04)
IMM GRANULOCYTES NFR BLD AUTO: 0.4 % (ref 0–0.5)
INR PPP: 1.2 (ref 0.8–1.2)
LYMPHOCYTES # BLD AUTO: 2 K/UL (ref 1–4.8)
LYMPHOCYTES NFR BLD: 22.1 % (ref 18–48)
MAGNESIUM SERPL-MCNC: 1.8 MG/DL (ref 1.6–2.6)
MCH RBC QN AUTO: 22.1 PG (ref 27–31)
MCHC RBC AUTO-ENTMCNC: 28.8 G/DL (ref 32–36)
MCV RBC AUTO: 77 FL (ref 82–98)
MONOCYTES # BLD AUTO: 1.4 K/UL (ref 0.3–1)
MONOCYTES NFR BLD: 15.6 % (ref 4–15)
NEUTROPHILS # BLD AUTO: 5.5 K/UL (ref 1.8–7.7)
NEUTROPHILS NFR BLD: 60.1 % (ref 38–73)
NRBC BLD-RTO: 0 /100 WBC
PHOSPHATE SERPL-MCNC: 2.6 MG/DL (ref 2.7–4.5)
PLATELET # BLD AUTO: 382 K/UL (ref 150–450)
PMV BLD AUTO: 11 FL (ref 9.2–12.9)
POTASSIUM SERPL-SCNC: 3.5 MMOL/L (ref 3.5–5.1)
PROT SERPL-MCNC: 5.5 G/DL (ref 6–8.4)
PROTHROMBIN TIME: 13 SEC (ref 9–12.5)
RBC # BLD AUTO: 3.49 M/UL (ref 4.6–6.2)
SODIUM SERPL-SCNC: 138 MMOL/L (ref 136–145)
WBC # BLD AUTO: 9.12 K/UL (ref 3.9–12.7)

## 2021-04-22 PROCEDURE — 85025 COMPLETE CBC W/AUTO DIFF WBC: CPT | Performed by: HOSPITALIST

## 2021-04-22 PROCEDURE — 25000003 PHARM REV CODE 250: Performed by: STUDENT IN AN ORGANIZED HEALTH CARE EDUCATION/TRAINING PROGRAM

## 2021-04-22 PROCEDURE — 84100 ASSAY OF PHOSPHORUS: CPT | Performed by: STUDENT IN AN ORGANIZED HEALTH CARE EDUCATION/TRAINING PROGRAM

## 2021-04-22 PROCEDURE — 83735 ASSAY OF MAGNESIUM: CPT | Performed by: STUDENT IN AN ORGANIZED HEALTH CARE EDUCATION/TRAINING PROGRAM

## 2021-04-22 PROCEDURE — 99239 PR HOSPITAL DISCHARGE DAY,>30 MIN: ICD-10-PCS | Mod: GC,,, | Performed by: STUDENT IN AN ORGANIZED HEALTH CARE EDUCATION/TRAINING PROGRAM

## 2021-04-22 PROCEDURE — 85610 PROTHROMBIN TIME: CPT | Performed by: HOSPITALIST

## 2021-04-22 PROCEDURE — 99239 HOSP IP/OBS DSCHRG MGMT >30: CPT | Mod: GC,,, | Performed by: STUDENT IN AN ORGANIZED HEALTH CARE EDUCATION/TRAINING PROGRAM

## 2021-04-22 PROCEDURE — 36415 COLL VENOUS BLD VENIPUNCTURE: CPT | Performed by: STUDENT IN AN ORGANIZED HEALTH CARE EDUCATION/TRAINING PROGRAM

## 2021-04-22 PROCEDURE — 63600175 PHARM REV CODE 636 W HCPCS: Performed by: STUDENT IN AN ORGANIZED HEALTH CARE EDUCATION/TRAINING PROGRAM

## 2021-04-22 PROCEDURE — 80053 COMPREHEN METABOLIC PANEL: CPT | Performed by: STUDENT IN AN ORGANIZED HEALTH CARE EDUCATION/TRAINING PROGRAM

## 2021-04-22 PROCEDURE — 85730 THROMBOPLASTIN TIME PARTIAL: CPT | Performed by: HOSPITALIST

## 2021-04-22 RX ORDER — SODIUM,POTASSIUM PHOSPHATES 280-250MG
2 POWDER IN PACKET (EA) ORAL ONCE
Status: COMPLETED | OUTPATIENT
Start: 2021-04-22 | End: 2021-04-22

## 2021-04-22 RX ORDER — FERROUS SULFATE 325(65) MG
325 TABLET, DELAYED RELEASE (ENTERIC COATED) ORAL 2 TIMES DAILY
Qty: 180 TABLET | Refills: 0 | Status: SHIPPED | OUTPATIENT
Start: 2021-04-22 | End: 2021-07-22 | Stop reason: SDUPTHER

## 2021-04-22 RX ADMIN — POLYETHYLENE GLYCOL 3350 17 G: 17 POWDER, FOR SOLUTION ORAL at 08:04

## 2021-04-22 RX ADMIN — AMLODIPINE BESYLATE 10 MG: 10 TABLET ORAL at 08:04

## 2021-04-22 RX ADMIN — POTASSIUM & SODIUM PHOSPHATES POWDER PACK 280-160-250 MG 2 PACKET: 280-160-250 PACK at 09:04

## 2021-04-22 RX ADMIN — DOCUSATE SODIUM 50MG AND SENNOSIDES 8.6MG 1 TABLET: 8.6; 5 TABLET, FILM COATED ORAL at 08:04

## 2021-04-22 RX ADMIN — ENOXAPARIN SODIUM 60 MG: 60 INJECTION SUBCUTANEOUS at 08:04

## 2021-04-23 PROCEDURE — G0180 PR HOME HEALTH MD CERTIFICATION: ICD-10-PCS | Mod: ,,, | Performed by: HOSPITALIST

## 2021-04-23 PROCEDURE — G0180 MD CERTIFICATION HHA PATIENT: HCPCS | Mod: ,,, | Performed by: HOSPITALIST

## 2021-04-26 ENCOUNTER — ANTI-COAG VISIT (OUTPATIENT)
Dept: CARDIOLOGY | Facility: CLINIC | Age: 71
End: 2021-04-26
Payer: MEDICARE

## 2021-04-26 ENCOUNTER — TELEPHONE (OUTPATIENT)
Dept: INTERNAL MEDICINE | Facility: CLINIC | Age: 71
End: 2021-04-26

## 2021-04-26 DIAGNOSIS — C78.7 METASTATIC COLON CANCER TO LIVER: Primary | ICD-10-CM

## 2021-04-26 DIAGNOSIS — C18.9 METASTATIC COLON CANCER TO LIVER: Primary | ICD-10-CM

## 2021-04-26 DIAGNOSIS — K55.069 MESENTERIC VEIN THROMBOSIS: Primary | ICD-10-CM

## 2021-04-26 LAB — INR PPP: 3

## 2021-04-26 PROCEDURE — 93793 ANTICOAG MGMT PT WARFARIN: CPT | Mod: ,,,

## 2021-04-26 PROCEDURE — 93793 PR ANTICOAGULANT MGMT FOR PT TAKING WARFARIN: ICD-10-PCS | Mod: ,,,

## 2021-04-27 ENCOUNTER — OFFICE VISIT (OUTPATIENT)
Dept: INTERNAL MEDICINE | Facility: CLINIC | Age: 71
End: 2021-04-27
Payer: MEDICARE

## 2021-04-27 ENCOUNTER — LAB VISIT (OUTPATIENT)
Dept: LAB | Facility: HOSPITAL | Age: 71
End: 2021-04-27
Payer: MEDICARE

## 2021-04-27 VITALS
HEART RATE: 107 BPM | DIASTOLIC BLOOD PRESSURE: 72 MMHG | WEIGHT: 122.81 LBS | OXYGEN SATURATION: 99 % | HEIGHT: 67 IN | BODY MASS INDEX: 19.28 KG/M2 | SYSTOLIC BLOOD PRESSURE: 110 MMHG

## 2021-04-27 DIAGNOSIS — K55.069 MESENTERIC VEIN THROMBOSIS: ICD-10-CM

## 2021-04-27 DIAGNOSIS — C78.7 METASTATIC COLON CANCER TO LIVER: Primary | ICD-10-CM

## 2021-04-27 DIAGNOSIS — D50.9 MICROCYTIC ANEMIA: ICD-10-CM

## 2021-04-27 DIAGNOSIS — C78.7 METASTATIC COLON CANCER TO LIVER: ICD-10-CM

## 2021-04-27 DIAGNOSIS — C18.9 METASTATIC COLON CANCER TO LIVER: ICD-10-CM

## 2021-04-27 DIAGNOSIS — C18.9 METASTATIC COLON CANCER TO LIVER: Primary | ICD-10-CM

## 2021-04-27 PROBLEM — R11.0 NAUSEA: Status: RESOLVED | Noted: 2021-04-19 | Resolved: 2021-04-27

## 2021-04-27 LAB
ALBUMIN SERPL BCP-MCNC: 2.8 G/DL (ref 3.5–5.2)
ALP SERPL-CCNC: 81 U/L (ref 55–135)
ALT SERPL W/O P-5'-P-CCNC: 38 U/L (ref 10–44)
ANION GAP SERPL CALC-SCNC: 10 MMOL/L (ref 8–16)
AST SERPL-CCNC: 47 U/L (ref 10–40)
BASOPHILS # BLD AUTO: 0.07 K/UL (ref 0–0.2)
BASOPHILS NFR BLD: 0.6 % (ref 0–1.9)
BILIRUB SERPL-MCNC: 0.3 MG/DL (ref 0.1–1)
BUN SERPL-MCNC: 13 MG/DL (ref 8–23)
CALCIUM SERPL-MCNC: 8.7 MG/DL (ref 8.7–10.5)
CHLORIDE SERPL-SCNC: 104 MMOL/L (ref 95–110)
CO2 SERPL-SCNC: 26 MMOL/L (ref 23–29)
CREAT SERPL-MCNC: 0.9 MG/DL (ref 0.5–1.4)
DIFFERENTIAL METHOD: ABNORMAL
EOSINOPHIL # BLD AUTO: 0.3 K/UL (ref 0–0.5)
EOSINOPHIL NFR BLD: 2.5 % (ref 0–8)
ERYTHROCYTE [DISTWIDTH] IN BLOOD BY AUTOMATED COUNT: 18.1 % (ref 11.5–14.5)
EST. GFR  (AFRICAN AMERICAN): >60 ML/MIN/1.73 M^2
EST. GFR  (NON AFRICAN AMERICAN): >60 ML/MIN/1.73 M^2
GLUCOSE SERPL-MCNC: 97 MG/DL (ref 70–110)
HCT VFR BLD AUTO: 30.9 % (ref 40–54)
HGB BLD-MCNC: 8.9 G/DL (ref 14–18)
IMM GRANULOCYTES # BLD AUTO: 0.04 K/UL (ref 0–0.04)
IMM GRANULOCYTES NFR BLD AUTO: 0.4 % (ref 0–0.5)
LYMPHOCYTES # BLD AUTO: 3 K/UL (ref 1–4.8)
LYMPHOCYTES NFR BLD: 26.8 % (ref 18–48)
MCH RBC QN AUTO: 21.8 PG (ref 27–31)
MCHC RBC AUTO-ENTMCNC: 28.8 G/DL (ref 32–36)
MCV RBC AUTO: 76 FL (ref 82–98)
MONOCYTES # BLD AUTO: 1.6 K/UL (ref 0.3–1)
MONOCYTES NFR BLD: 14.4 % (ref 4–15)
NEUTROPHILS # BLD AUTO: 6.1 K/UL (ref 1.8–7.7)
NEUTROPHILS NFR BLD: 55.3 % (ref 38–73)
NRBC BLD-RTO: 0 /100 WBC
PLATELET # BLD AUTO: 549 K/UL (ref 150–450)
PMV BLD AUTO: 9.8 FL (ref 9.2–12.9)
POTASSIUM SERPL-SCNC: 4.2 MMOL/L (ref 3.5–5.1)
PROT SERPL-MCNC: 7 G/DL (ref 6–8.4)
RBC # BLD AUTO: 4.09 M/UL (ref 4.6–6.2)
SODIUM SERPL-SCNC: 140 MMOL/L (ref 136–145)
WBC # BLD AUTO: 11.01 K/UL (ref 3.9–12.7)

## 2021-04-27 PROCEDURE — 99204 OFFICE O/P NEW MOD 45 MIN: CPT | Mod: S$PBB,GC,, | Performed by: STUDENT IN AN ORGANIZED HEALTH CARE EDUCATION/TRAINING PROGRAM

## 2021-04-27 PROCEDURE — 36415 COLL VENOUS BLD VENIPUNCTURE: CPT | Performed by: STUDENT IN AN ORGANIZED HEALTH CARE EDUCATION/TRAINING PROGRAM

## 2021-04-27 PROCEDURE — 99999 PR PBB SHADOW E&M-EST. PATIENT-LVL III: CPT | Mod: PBBFAC,GC,, | Performed by: STUDENT IN AN ORGANIZED HEALTH CARE EDUCATION/TRAINING PROGRAM

## 2021-04-27 PROCEDURE — 80053 COMPREHEN METABOLIC PANEL: CPT | Performed by: STUDENT IN AN ORGANIZED HEALTH CARE EDUCATION/TRAINING PROGRAM

## 2021-04-27 PROCEDURE — 99204 PR OFFICE/OUTPT VISIT, NEW, LEVL IV, 45-59 MIN: ICD-10-PCS | Mod: S$PBB,GC,, | Performed by: STUDENT IN AN ORGANIZED HEALTH CARE EDUCATION/TRAINING PROGRAM

## 2021-04-27 PROCEDURE — 99213 OFFICE O/P EST LOW 20 MIN: CPT | Mod: PBBFAC | Performed by: STUDENT IN AN ORGANIZED HEALTH CARE EDUCATION/TRAINING PROGRAM

## 2021-04-27 PROCEDURE — 99999 PR PBB SHADOW E&M-EST. PATIENT-LVL III: ICD-10-PCS | Mod: PBBFAC,GC,, | Performed by: STUDENT IN AN ORGANIZED HEALTH CARE EDUCATION/TRAINING PROGRAM

## 2021-04-27 PROCEDURE — 85025 COMPLETE CBC W/AUTO DIFF WBC: CPT | Performed by: STUDENT IN AN ORGANIZED HEALTH CARE EDUCATION/TRAINING PROGRAM

## 2021-04-28 ENCOUNTER — TELEPHONE (OUTPATIENT)
Dept: HEMATOLOGY/ONCOLOGY | Facility: CLINIC | Age: 71
End: 2021-04-28

## 2021-04-29 ENCOUNTER — TELEPHONE (OUTPATIENT)
Dept: VASCULAR SURGERY | Facility: CLINIC | Age: 71
End: 2021-04-29

## 2021-04-29 ENCOUNTER — TELEPHONE (OUTPATIENT)
Dept: HEMATOLOGY/ONCOLOGY | Facility: CLINIC | Age: 71
End: 2021-04-29

## 2021-04-29 ENCOUNTER — ANTI-COAG VISIT (OUTPATIENT)
Dept: CARDIOLOGY | Facility: CLINIC | Age: 71
End: 2021-04-29
Payer: MEDICARE

## 2021-04-29 ENCOUNTER — OFFICE VISIT (OUTPATIENT)
Dept: HEMATOLOGY/ONCOLOGY | Facility: CLINIC | Age: 71
End: 2021-04-29
Payer: MEDICARE

## 2021-04-29 VITALS
WEIGHT: 122.13 LBS | DIASTOLIC BLOOD PRESSURE: 69 MMHG | HEART RATE: 101 BPM | SYSTOLIC BLOOD PRESSURE: 111 MMHG | HEIGHT: 67 IN | RESPIRATION RATE: 16 BRPM | BODY MASS INDEX: 19.17 KG/M2 | TEMPERATURE: 97 F | OXYGEN SATURATION: 98 %

## 2021-04-29 DIAGNOSIS — K55.069 SUPERIOR MESENTERIC VEIN THROMBOSIS: ICD-10-CM

## 2021-04-29 DIAGNOSIS — C78.7 METASTATIC COLON CANCER TO LIVER: Primary | ICD-10-CM

## 2021-04-29 DIAGNOSIS — Z01.818 PRE-OP EVALUATION: Primary | ICD-10-CM

## 2021-04-29 DIAGNOSIS — C18.9 METASTATIC COLON CANCER TO LIVER: Primary | ICD-10-CM

## 2021-04-29 DIAGNOSIS — I10 HYPERTENSION, UNSPECIFIED TYPE: ICD-10-CM

## 2021-04-29 DIAGNOSIS — K55.069 MESENTERIC VEIN THROMBOSIS: Primary | ICD-10-CM

## 2021-04-29 DIAGNOSIS — D50.0 IRON DEFICIENCY ANEMIA DUE TO CHRONIC BLOOD LOSS: ICD-10-CM

## 2021-04-29 LAB — INR PPP: 3.4

## 2021-04-29 PROCEDURE — 99215 OFFICE O/P EST HI 40 MIN: CPT | Mod: S$PBB,GC,, | Performed by: STUDENT IN AN ORGANIZED HEALTH CARE EDUCATION/TRAINING PROGRAM

## 2021-04-29 PROCEDURE — 99215 PR OFFICE/OUTPT VISIT, EST, LEVL V, 40-54 MIN: ICD-10-PCS | Mod: S$PBB,GC,, | Performed by: STUDENT IN AN ORGANIZED HEALTH CARE EDUCATION/TRAINING PROGRAM

## 2021-04-29 PROCEDURE — 99999 PR PBB SHADOW E&M-EST. PATIENT-LVL III: ICD-10-PCS | Mod: PBBFAC,GC,, | Performed by: STUDENT IN AN ORGANIZED HEALTH CARE EDUCATION/TRAINING PROGRAM

## 2021-04-29 PROCEDURE — 93793 ANTICOAG MGMT PT WARFARIN: CPT | Mod: ,,,

## 2021-04-29 PROCEDURE — 93793 PR ANTICOAGULANT MGMT FOR PT TAKING WARFARIN: ICD-10-PCS | Mod: ,,,

## 2021-04-29 PROCEDURE — 99213 OFFICE O/P EST LOW 20 MIN: CPT | Mod: PBBFAC | Performed by: STUDENT IN AN ORGANIZED HEALTH CARE EDUCATION/TRAINING PROGRAM

## 2021-04-29 PROCEDURE — 99999 PR PBB SHADOW E&M-EST. PATIENT-LVL III: CPT | Mod: PBBFAC,GC,, | Performed by: STUDENT IN AN ORGANIZED HEALTH CARE EDUCATION/TRAINING PROGRAM

## 2021-04-30 ENCOUNTER — CLINICAL SUPPORT (OUTPATIENT)
Dept: INTERNAL MEDICINE | Facility: CLINIC | Age: 71
End: 2021-04-30
Payer: MEDICARE

## 2021-04-30 ENCOUNTER — TELEPHONE (OUTPATIENT)
Dept: VASCULAR SURGERY | Facility: CLINIC | Age: 71
End: 2021-04-30

## 2021-04-30 ENCOUNTER — LAB VISIT (OUTPATIENT)
Dept: INTERNAL MEDICINE | Facility: CLINIC | Age: 71
End: 2021-04-30
Payer: MEDICARE

## 2021-04-30 ENCOUNTER — OFFICE VISIT (OUTPATIENT)
Dept: INTERNAL MEDICINE | Facility: CLINIC | Age: 71
End: 2021-04-30
Payer: MEDICARE

## 2021-04-30 ENCOUNTER — TELEPHONE (OUTPATIENT)
Dept: HEMATOLOGY/ONCOLOGY | Facility: CLINIC | Age: 71
End: 2021-04-30

## 2021-04-30 VITALS
HEART RATE: 91 BPM | SYSTOLIC BLOOD PRESSURE: 110 MMHG | DIASTOLIC BLOOD PRESSURE: 70 MMHG | OXYGEN SATURATION: 99 % | RESPIRATION RATE: 12 BRPM

## 2021-04-30 DIAGNOSIS — C78.7 METASTATIC COLON CANCER TO LIVER: Primary | ICD-10-CM

## 2021-04-30 DIAGNOSIS — C18.9 METASTATIC COLON CANCER TO LIVER: Primary | ICD-10-CM

## 2021-04-30 DIAGNOSIS — D50.9 MICROCYTIC ANEMIA: ICD-10-CM

## 2021-04-30 DIAGNOSIS — K55.069 MESENTERIC VEIN THROMBOSIS: ICD-10-CM

## 2021-04-30 DIAGNOSIS — Z01.818 PRE-OP EVALUATION: ICD-10-CM

## 2021-04-30 PROCEDURE — G0009 ADMIN PNEUMOCOCCAL VACCINE: HCPCS | Mod: PBBFAC

## 2021-04-30 PROCEDURE — U0003 INFECTIOUS AGENT DETECTION BY NUCLEIC ACID (DNA OR RNA); SEVERE ACUTE RESPIRATORY SYNDROME CORONAVIRUS 2 (SARS-COV-2) (CORONAVIRUS DISEASE [COVID-19]), AMPLIFIED PROBE TECHNIQUE, MAKING USE OF HIGH THROUGHPUT TECHNOLOGIES AS DESCRIBED BY CMS-2020-01-R: HCPCS | Performed by: SURGERY

## 2021-04-30 PROCEDURE — U0005 INFEC AGEN DETEC AMPLI PROBE: HCPCS | Performed by: SURGERY

## 2021-04-30 PROCEDURE — 99213 OFFICE O/P EST LOW 20 MIN: CPT | Mod: S$PBB,GC,, | Performed by: STUDENT IN AN ORGANIZED HEALTH CARE EDUCATION/TRAINING PROGRAM

## 2021-04-30 PROCEDURE — 99999 PR PBB SHADOW E&M-EST. PATIENT-LVL III: CPT | Mod: PBBFAC,GC,, | Performed by: STUDENT IN AN ORGANIZED HEALTH CARE EDUCATION/TRAINING PROGRAM

## 2021-04-30 PROCEDURE — 90670 PCV13 VACCINE IM: CPT | Mod: PBBFAC

## 2021-04-30 PROCEDURE — 99999 PR PBB SHADOW E&M-EST. PATIENT-LVL III: ICD-10-PCS | Mod: PBBFAC,GC,, | Performed by: STUDENT IN AN ORGANIZED HEALTH CARE EDUCATION/TRAINING PROGRAM

## 2021-04-30 PROCEDURE — 99213 OFFICE O/P EST LOW 20 MIN: CPT | Mod: PBBFAC,25 | Performed by: STUDENT IN AN ORGANIZED HEALTH CARE EDUCATION/TRAINING PROGRAM

## 2021-04-30 PROCEDURE — 99213 PR OFFICE/OUTPT VISIT, EST, LEVL III, 20-29 MIN: ICD-10-PCS | Mod: S$PBB,GC,, | Performed by: STUDENT IN AN ORGANIZED HEALTH CARE EDUCATION/TRAINING PROGRAM

## 2021-05-01 ENCOUNTER — IMMUNIZATION (OUTPATIENT)
Dept: PRIMARY CARE CLINIC | Facility: CLINIC | Age: 71
End: 2021-05-01
Payer: MEDICARE

## 2021-05-01 ENCOUNTER — LAB VISIT (OUTPATIENT)
Dept: LAB | Facility: HOSPITAL | Age: 71
End: 2021-05-01
Payer: MEDICARE

## 2021-05-01 DIAGNOSIS — Z23 NEED FOR VACCINATION: Primary | ICD-10-CM

## 2021-05-01 DIAGNOSIS — C78.7 METASTATIC COLON CANCER TO LIVER: ICD-10-CM

## 2021-05-01 DIAGNOSIS — C18.9 METASTATIC COLON CANCER TO LIVER: ICD-10-CM

## 2021-05-01 DIAGNOSIS — K55.069 MESENTERIC VEIN THROMBOSIS: ICD-10-CM

## 2021-05-01 LAB
ALBUMIN SERPL BCP-MCNC: 2.6 G/DL (ref 3.5–5.2)
ALP SERPL-CCNC: 83 U/L (ref 55–135)
ALT SERPL W/O P-5'-P-CCNC: 24 U/L (ref 10–44)
ANION GAP SERPL CALC-SCNC: 4 MMOL/L (ref 8–16)
AST SERPL-CCNC: 34 U/L (ref 10–40)
BASOPHILS # BLD AUTO: 0.08 K/UL (ref 0–0.2)
BASOPHILS NFR BLD: 0.7 % (ref 0–1.9)
BILIRUB SERPL-MCNC: 0.4 MG/DL (ref 0.1–1)
BUN SERPL-MCNC: 14 MG/DL (ref 8–23)
CALCIUM SERPL-MCNC: 9 MG/DL (ref 8.7–10.5)
CHLORIDE SERPL-SCNC: 104 MMOL/L (ref 95–110)
CO2 SERPL-SCNC: 29 MMOL/L (ref 23–29)
CREAT SERPL-MCNC: 0.9 MG/DL (ref 0.5–1.4)
DIFFERENTIAL METHOD: ABNORMAL
EOSINOPHIL # BLD AUTO: 0.3 K/UL (ref 0–0.5)
EOSINOPHIL NFR BLD: 2.5 % (ref 0–8)
ERYTHROCYTE [DISTWIDTH] IN BLOOD BY AUTOMATED COUNT: 19.1 % (ref 11.5–14.5)
EST. GFR  (AFRICAN AMERICAN): >60 ML/MIN/1.73 M^2
EST. GFR  (NON AFRICAN AMERICAN): >60 ML/MIN/1.73 M^2
GLUCOSE SERPL-MCNC: 122 MG/DL (ref 70–110)
HCT VFR BLD AUTO: 31.3 % (ref 40–54)
HGB BLD-MCNC: 8.8 G/DL (ref 14–18)
IMM GRANULOCYTES # BLD AUTO: 0.03 K/UL (ref 0–0.04)
IMM GRANULOCYTES NFR BLD AUTO: 0.3 % (ref 0–0.5)
INR PPP: 1.7 (ref 0.8–1.2)
LYMPHOCYTES # BLD AUTO: 2.6 K/UL (ref 1–4.8)
LYMPHOCYTES NFR BLD: 23.8 % (ref 18–48)
MCH RBC QN AUTO: 21.9 PG (ref 27–31)
MCHC RBC AUTO-ENTMCNC: 28.1 G/DL (ref 32–36)
MCV RBC AUTO: 78 FL (ref 82–98)
MONOCYTES # BLD AUTO: 1.2 K/UL (ref 0.3–1)
MONOCYTES NFR BLD: 11.1 % (ref 4–15)
NEUTROPHILS # BLD AUTO: 6.8 K/UL (ref 1.8–7.7)
NEUTROPHILS NFR BLD: 61.6 % (ref 38–73)
NRBC BLD-RTO: 0 /100 WBC
PLATELET # BLD AUTO: 520 K/UL (ref 150–450)
PMV BLD AUTO: 10.2 FL (ref 9.2–12.9)
POTASSIUM SERPL-SCNC: 3.5 MMOL/L (ref 3.5–5.1)
PROT SERPL-MCNC: 6.9 G/DL (ref 6–8.4)
PROTHROMBIN TIME: 17.6 SEC (ref 9–12.5)
RBC # BLD AUTO: 4.01 M/UL (ref 4.6–6.2)
SARS-COV-2 RNA RESP QL NAA+PROBE: NOT DETECTED
SODIUM SERPL-SCNC: 137 MMOL/L (ref 136–145)
WBC # BLD AUTO: 11.04 K/UL (ref 3.9–12.7)

## 2021-05-01 PROCEDURE — 85610 PROTHROMBIN TIME: CPT | Performed by: INTERNAL MEDICINE

## 2021-05-01 PROCEDURE — 80053 COMPREHEN METABOLIC PANEL: CPT | Performed by: STUDENT IN AN ORGANIZED HEALTH CARE EDUCATION/TRAINING PROGRAM

## 2021-05-01 PROCEDURE — 36415 COLL VENOUS BLD VENIPUNCTURE: CPT | Performed by: INTERNAL MEDICINE

## 2021-05-01 PROCEDURE — 85025 COMPLETE CBC W/AUTO DIFF WBC: CPT | Performed by: STUDENT IN AN ORGANIZED HEALTH CARE EDUCATION/TRAINING PROGRAM

## 2021-05-03 ENCOUNTER — ANTI-COAG VISIT (OUTPATIENT)
Dept: CARDIOLOGY | Facility: CLINIC | Age: 71
End: 2021-05-03
Payer: MEDICARE

## 2021-05-03 ENCOUNTER — ANESTHESIA (OUTPATIENT)
Dept: SURGERY | Facility: HOSPITAL | Age: 71
End: 2021-05-03
Payer: MEDICARE

## 2021-05-03 ENCOUNTER — TELEPHONE (OUTPATIENT)
Dept: VASCULAR SURGERY | Facility: CLINIC | Age: 71
End: 2021-05-03

## 2021-05-03 ENCOUNTER — HOSPITAL ENCOUNTER (OUTPATIENT)
Facility: HOSPITAL | Age: 71
Discharge: HOME | End: 2021-05-03
Attending: SURGERY | Admitting: SURGERY
Payer: MEDICARE

## 2021-05-03 ENCOUNTER — ANESTHESIA EVENT (OUTPATIENT)
Dept: SURGERY | Facility: HOSPITAL | Age: 71
End: 2021-05-03
Payer: MEDICARE

## 2021-05-03 VITALS
WEIGHT: 122 LBS | BODY MASS INDEX: 19.15 KG/M2 | DIASTOLIC BLOOD PRESSURE: 70 MMHG | SYSTOLIC BLOOD PRESSURE: 135 MMHG | HEART RATE: 67 BPM | HEIGHT: 67 IN | OXYGEN SATURATION: 100 % | RESPIRATION RATE: 22 BRPM | TEMPERATURE: 98 F

## 2021-05-03 DIAGNOSIS — C78.7 METASTATIC COLON CANCER TO LIVER: ICD-10-CM

## 2021-05-03 DIAGNOSIS — C18.9 METASTATIC COLON CANCER TO LIVER: ICD-10-CM

## 2021-05-03 DIAGNOSIS — C18.9 COLON CANCER METASTASIZED TO LIVER: ICD-10-CM

## 2021-05-03 DIAGNOSIS — C78.7 COLON CANCER METASTASIZED TO LIVER: ICD-10-CM

## 2021-05-03 DIAGNOSIS — Z95.828 PORT-A-CATH IN PLACE: Primary | ICD-10-CM

## 2021-05-03 DIAGNOSIS — K55.069 MESENTERIC VEIN THROMBOSIS: Primary | ICD-10-CM

## 2021-05-03 LAB
INR PPP: 1.2 (ref 0.8–1.2)
PROTHROMBIN TIME: 13 SEC (ref 9–12.5)

## 2021-05-03 PROCEDURE — 36000707: Performed by: SURGERY

## 2021-05-03 PROCEDURE — 36561 PR INSERT TUNNELED CV CATH WITH PORT: ICD-10-PCS | Mod: RT,GC,, | Performed by: SURGERY

## 2021-05-03 PROCEDURE — D9220A PRA ANESTHESIA: Mod: CRNA,,, | Performed by: NURSE ANESTHETIST, CERTIFIED REGISTERED

## 2021-05-03 PROCEDURE — 36000706: Performed by: SURGERY

## 2021-05-03 PROCEDURE — 37000008 HC ANESTHESIA 1ST 15 MINUTES: Performed by: SURGERY

## 2021-05-03 PROCEDURE — 63600175 PHARM REV CODE 636 W HCPCS: Performed by: NURSE ANESTHETIST, CERTIFIED REGISTERED

## 2021-05-03 PROCEDURE — D9220A PRA ANESTHESIA: ICD-10-PCS | Mod: CRNA,,, | Performed by: NURSE ANESTHETIST, CERTIFIED REGISTERED

## 2021-05-03 PROCEDURE — D9220A PRA ANESTHESIA: ICD-10-PCS | Mod: ANES,,, | Performed by: ANESTHESIOLOGY

## 2021-05-03 PROCEDURE — 25000003 PHARM REV CODE 250: Performed by: SURGERY

## 2021-05-03 PROCEDURE — 71000015 HC POSTOP RECOV 1ST HR: Performed by: SURGERY

## 2021-05-03 PROCEDURE — 71000044 HC DOSC ROUTINE RECOVERY FIRST HOUR: Performed by: SURGERY

## 2021-05-03 PROCEDURE — 77001 CHG FLUOROGUIDE CNTRL VEN ACCESS,PLACE,REPLACE,REMOVE: ICD-10-PCS | Mod: 26,GC,, | Performed by: SURGERY

## 2021-05-03 PROCEDURE — 63600175 PHARM REV CODE 636 W HCPCS: Performed by: SURGERY

## 2021-05-03 PROCEDURE — 85610 PROTHROMBIN TIME: CPT | Performed by: SURGERY

## 2021-05-03 PROCEDURE — 71000016 HC POSTOP RECOV ADDL HR: Performed by: SURGERY

## 2021-05-03 PROCEDURE — C1788 PORT, INDWELLING, IMP: HCPCS | Performed by: SURGERY

## 2021-05-03 PROCEDURE — D9220A PRA ANESTHESIA: Mod: ANES,,, | Performed by: ANESTHESIOLOGY

## 2021-05-03 PROCEDURE — 77001 FLUOROGUIDE FOR VEIN DEVICE: CPT | Mod: 26,GC,, | Performed by: SURGERY

## 2021-05-03 PROCEDURE — 25000003 PHARM REV CODE 250: Performed by: NURSE ANESTHETIST, CERTIFIED REGISTERED

## 2021-05-03 PROCEDURE — 36561 INSERT TUNNELED CV CATH: CPT | Mod: RT,GC,, | Performed by: SURGERY

## 2021-05-03 PROCEDURE — 93793 ANTICOAG MGMT PT WARFARIN: CPT | Mod: ,,,

## 2021-05-03 PROCEDURE — 93793 PR ANTICOAGULANT MGMT FOR PT TAKING WARFARIN: ICD-10-PCS | Mod: ,,,

## 2021-05-03 PROCEDURE — 37000009 HC ANESTHESIA EA ADD 15 MINS: Performed by: SURGERY

## 2021-05-03 DEVICE — KIT POWERPORT SINGLE 8FR: Type: IMPLANTABLE DEVICE | Site: CHEST | Status: FUNCTIONAL

## 2021-05-03 RX ORDER — LIDOCAINE HCL/PF 100 MG/5ML
SYRINGE (ML) INTRAVENOUS
Status: DISCONTINUED | OUTPATIENT
Start: 2021-05-03 | End: 2021-05-03

## 2021-05-03 RX ORDER — HEPARIN SODIUM (PORCINE) LOCK FLUSH IV SOLN 100 UNIT/ML 100 UNIT/ML
SOLUTION INTRAVENOUS
Status: DISCONTINUED | OUTPATIENT
Start: 2021-05-03 | End: 2021-05-03 | Stop reason: HOSPADM

## 2021-05-03 RX ORDER — IBUPROFEN 400 MG/1
400 TABLET ORAL EVERY 6 HOURS PRN
COMMUNITY
Start: 2021-05-03

## 2021-05-03 RX ORDER — SODIUM CHLORIDE 9 MG/ML
INJECTION, SOLUTION INTRAVENOUS CONTINUOUS PRN
Status: DISCONTINUED | OUTPATIENT
Start: 2021-05-03 | End: 2021-05-03

## 2021-05-03 RX ORDER — ONDANSETRON 2 MG/ML
INJECTION INTRAMUSCULAR; INTRAVENOUS
Status: DISCONTINUED | OUTPATIENT
Start: 2021-05-03 | End: 2021-05-03

## 2021-05-03 RX ORDER — CEFAZOLIN SODIUM 1 G/3ML
2 INJECTION, POWDER, FOR SOLUTION INTRAMUSCULAR; INTRAVENOUS
Status: DISCONTINUED | OUTPATIENT
Start: 2021-05-03 | End: 2021-05-03 | Stop reason: HOSPADM

## 2021-05-03 RX ORDER — FENTANYL CITRATE 50 UG/ML
INJECTION, SOLUTION INTRAMUSCULAR; INTRAVENOUS
Status: DISCONTINUED | OUTPATIENT
Start: 2021-05-03 | End: 2021-05-03

## 2021-05-03 RX ORDER — PROPOFOL 10 MG/ML
VIAL (ML) INTRAVENOUS
Status: DISCONTINUED | OUTPATIENT
Start: 2021-05-03 | End: 2021-05-03

## 2021-05-03 RX ORDER — SODIUM CHLORIDE 9 MG/ML
INJECTION, SOLUTION INTRAVENOUS CONTINUOUS
Status: DISCONTINUED | OUTPATIENT
Start: 2021-05-03 | End: 2021-05-03 | Stop reason: HOSPADM

## 2021-05-03 RX ORDER — ACETAMINOPHEN 500 MG
500 TABLET ORAL EVERY 6 HOURS PRN
Refills: 0 | COMMUNITY
Start: 2021-05-03

## 2021-05-03 RX ORDER — LORAZEPAM 2 MG/ML
0.25 INJECTION INTRAMUSCULAR ONCE AS NEEDED
Status: DISCONTINUED | OUTPATIENT
Start: 2021-05-03 | End: 2021-05-03 | Stop reason: HOSPADM

## 2021-05-03 RX ORDER — PROPOFOL 10 MG/ML
VIAL (ML) INTRAVENOUS CONTINUOUS PRN
Status: DISCONTINUED | OUTPATIENT
Start: 2021-05-03 | End: 2021-05-03

## 2021-05-03 RX ORDER — FAMOTIDINE 10 MG/ML
INJECTION INTRAVENOUS
Status: DISCONTINUED | OUTPATIENT
Start: 2021-05-03 | End: 2021-05-03

## 2021-05-03 RX ORDER — MIDAZOLAM HYDROCHLORIDE 1 MG/ML
INJECTION, SOLUTION INTRAMUSCULAR; INTRAVENOUS
Status: DISCONTINUED | OUTPATIENT
Start: 2021-05-03 | End: 2021-05-03

## 2021-05-03 RX ORDER — CEFAZOLIN SODIUM 1 G/3ML
INJECTION, POWDER, FOR SOLUTION INTRAMUSCULAR; INTRAVENOUS
Status: DISCONTINUED | OUTPATIENT
Start: 2021-05-03 | End: 2021-05-03

## 2021-05-03 RX ORDER — HYDROMORPHONE HYDROCHLORIDE 1 MG/ML
0.2 INJECTION, SOLUTION INTRAMUSCULAR; INTRAVENOUS; SUBCUTANEOUS EVERY 5 MIN PRN
Status: DISCONTINUED | OUTPATIENT
Start: 2021-05-03 | End: 2021-05-03 | Stop reason: HOSPADM

## 2021-05-03 RX ORDER — LIDOCAINE HYDROCHLORIDE 10 MG/ML
INJECTION, SOLUTION EPIDURAL; INFILTRATION; INTRACAUDAL; PERINEURAL
Status: DISCONTINUED | OUTPATIENT
Start: 2021-05-03 | End: 2021-05-03 | Stop reason: HOSPADM

## 2021-05-03 RX ADMIN — CEFAZOLIN 2 G: 330 INJECTION, POWDER, FOR SOLUTION INTRAMUSCULAR; INTRAVENOUS at 07:05

## 2021-05-03 RX ADMIN — ONDANSETRON 4 MG: 2 INJECTION, SOLUTION INTRAMUSCULAR; INTRAVENOUS at 07:05

## 2021-05-03 RX ADMIN — FAMOTIDINE 20 MG: 10 INJECTION, SOLUTION INTRAVENOUS at 07:05

## 2021-05-03 RX ADMIN — FENTANYL CITRATE 25 MCG: 50 INJECTION, SOLUTION INTRAMUSCULAR; INTRAVENOUS at 08:05

## 2021-05-03 RX ADMIN — PROPOFOL 150 MCG/KG/MIN: 10 INJECTION, EMULSION INTRAVENOUS at 07:05

## 2021-05-03 RX ADMIN — MIDAZOLAM HYDROCHLORIDE 2 MG: 1 INJECTION, SOLUTION INTRAMUSCULAR; INTRAVENOUS at 07:05

## 2021-05-03 RX ADMIN — PROPOFOL 70 MG: 10 INJECTION, EMULSION INTRAVENOUS at 07:05

## 2021-05-03 RX ADMIN — SODIUM CHLORIDE: 0.9 INJECTION, SOLUTION INTRAVENOUS at 07:05

## 2021-05-03 RX ADMIN — LIDOCAINE HYDROCHLORIDE 50 MG: 20 INJECTION, SOLUTION INTRAVENOUS at 07:05

## 2021-05-06 ENCOUNTER — LAB VISIT (OUTPATIENT)
Dept: LAB | Facility: HOSPITAL | Age: 71
End: 2021-05-06
Payer: MEDICARE

## 2021-05-06 ENCOUNTER — ANTI-COAG VISIT (OUTPATIENT)
Dept: CARDIOLOGY | Facility: CLINIC | Age: 71
End: 2021-05-06
Payer: MEDICARE

## 2021-05-06 ENCOUNTER — OFFICE VISIT (OUTPATIENT)
Dept: HEMATOLOGY/ONCOLOGY | Facility: CLINIC | Age: 71
End: 2021-05-06
Payer: MEDICARE

## 2021-05-06 ENCOUNTER — INFUSION (OUTPATIENT)
Dept: INFUSION THERAPY | Facility: HOSPITAL | Age: 71
End: 2021-05-06
Payer: MEDICARE

## 2021-05-06 VITALS
HEART RATE: 95 BPM | WEIGHT: 123.25 LBS | SYSTOLIC BLOOD PRESSURE: 127 MMHG | BODY MASS INDEX: 19.35 KG/M2 | HEIGHT: 67 IN | RESPIRATION RATE: 18 BRPM | DIASTOLIC BLOOD PRESSURE: 77 MMHG | OXYGEN SATURATION: 100 % | TEMPERATURE: 98 F

## 2021-05-06 VITALS
BODY MASS INDEX: 19.35 KG/M2 | HEART RATE: 74 BPM | WEIGHT: 123.25 LBS | TEMPERATURE: 98 F | DIASTOLIC BLOOD PRESSURE: 76 MMHG | RESPIRATION RATE: 18 BRPM | SYSTOLIC BLOOD PRESSURE: 141 MMHG | HEIGHT: 67 IN

## 2021-05-06 DIAGNOSIS — C78.7 METASTATIC COLON CANCER TO LIVER: Primary | ICD-10-CM

## 2021-05-06 DIAGNOSIS — C78.7 METASTATIC COLON CANCER TO LIVER: ICD-10-CM

## 2021-05-06 DIAGNOSIS — C18.9 METASTATIC COLON CANCER TO LIVER: Primary | ICD-10-CM

## 2021-05-06 DIAGNOSIS — I10 HYPERTENSION, UNSPECIFIED TYPE: ICD-10-CM

## 2021-05-06 DIAGNOSIS — D50.0 IRON DEFICIENCY ANEMIA DUE TO CHRONIC BLOOD LOSS: ICD-10-CM

## 2021-05-06 DIAGNOSIS — K55.069 SUPERIOR MESENTERIC VEIN THROMBOSIS: ICD-10-CM

## 2021-05-06 DIAGNOSIS — C18.9 METASTATIC COLON CANCER TO LIVER: ICD-10-CM

## 2021-05-06 DIAGNOSIS — K55.069 MESENTERIC VEIN THROMBOSIS: Primary | ICD-10-CM

## 2021-05-06 DIAGNOSIS — K55.069 MESENTERIC VEIN THROMBOSIS: ICD-10-CM

## 2021-05-06 LAB
ALBUMIN SERPL BCP-MCNC: 2.7 G/DL (ref 3.5–5.2)
ALP SERPL-CCNC: 94 U/L (ref 55–135)
ALT SERPL W/O P-5'-P-CCNC: 19 U/L (ref 10–44)
ANION GAP SERPL CALC-SCNC: 9 MMOL/L (ref 8–16)
AST SERPL-CCNC: 33 U/L (ref 10–40)
BASOPHILS # BLD AUTO: 0.08 K/UL (ref 0–0.2)
BASOPHILS NFR BLD: 0.9 % (ref 0–1.9)
BILIRUB SERPL-MCNC: 0.4 MG/DL (ref 0.1–1)
BUN SERPL-MCNC: 9 MG/DL (ref 8–23)
CALCIUM SERPL-MCNC: 9.3 MG/DL (ref 8.7–10.5)
CHLORIDE SERPL-SCNC: 105 MMOL/L (ref 95–110)
CO2 SERPL-SCNC: 25 MMOL/L (ref 23–29)
CREAT SERPL-MCNC: 0.8 MG/DL (ref 0.5–1.4)
DIFFERENTIAL METHOD: ABNORMAL
EOSINOPHIL # BLD AUTO: 0.5 K/UL (ref 0–0.5)
EOSINOPHIL NFR BLD: 5.7 % (ref 0–8)
ERYTHROCYTE [DISTWIDTH] IN BLOOD BY AUTOMATED COUNT: 19.3 % (ref 11.5–14.5)
EST. GFR  (AFRICAN AMERICAN): >60 ML/MIN/1.73 M^2
EST. GFR  (NON AFRICAN AMERICAN): >60 ML/MIN/1.73 M^2
GLUCOSE SERPL-MCNC: 140 MG/DL (ref 70–110)
HCT VFR BLD AUTO: 32.4 % (ref 40–54)
HGB BLD-MCNC: 9.1 G/DL (ref 14–18)
IMM GRANULOCYTES # BLD AUTO: 0.02 K/UL (ref 0–0.04)
IMM GRANULOCYTES NFR BLD AUTO: 0.2 % (ref 0–0.5)
INR PPP: 1.5 (ref 0.8–1.2)
LYMPHOCYTES # BLD AUTO: 2.8 K/UL (ref 1–4.8)
LYMPHOCYTES NFR BLD: 30.6 % (ref 18–48)
MCH RBC QN AUTO: 22.2 PG (ref 27–31)
MCHC RBC AUTO-ENTMCNC: 28.1 G/DL (ref 32–36)
MCV RBC AUTO: 79 FL (ref 82–98)
MONOCYTES # BLD AUTO: 0.8 K/UL (ref 0.3–1)
MONOCYTES NFR BLD: 8.7 % (ref 4–15)
NEUTROPHILS # BLD AUTO: 4.9 K/UL (ref 1.8–7.7)
NEUTROPHILS NFR BLD: 53.9 % (ref 38–73)
NRBC BLD-RTO: 0 /100 WBC
PLATELET # BLD AUTO: 445 K/UL (ref 150–450)
PMV BLD AUTO: 9.1 FL (ref 9.2–12.9)
POTASSIUM SERPL-SCNC: 3.5 MMOL/L (ref 3.5–5.1)
PROT SERPL-MCNC: 7.4 G/DL (ref 6–8.4)
PROTHROMBIN TIME: 15.6 SEC (ref 9–12.5)
RBC # BLD AUTO: 4.09 M/UL (ref 4.6–6.2)
SODIUM SERPL-SCNC: 139 MMOL/L (ref 136–145)
WBC # BLD AUTO: 9.09 K/UL (ref 3.9–12.7)

## 2021-05-06 PROCEDURE — 99999 PR PBB SHADOW E&M-EST. PATIENT-LVL V: CPT | Mod: PBBFAC,GC,, | Performed by: STUDENT IN AN ORGANIZED HEALTH CARE EDUCATION/TRAINING PROGRAM

## 2021-05-06 PROCEDURE — 25000003 PHARM REV CODE 250: Performed by: INTERNAL MEDICINE

## 2021-05-06 PROCEDURE — 99215 OFFICE O/P EST HI 40 MIN: CPT | Mod: PBBFAC,25 | Performed by: STUDENT IN AN ORGANIZED HEALTH CARE EDUCATION/TRAINING PROGRAM

## 2021-05-06 PROCEDURE — 63600175 PHARM REV CODE 636 W HCPCS: Performed by: INTERNAL MEDICINE

## 2021-05-06 PROCEDURE — 96367 TX/PROPH/DG ADDL SEQ IV INF: CPT

## 2021-05-06 PROCEDURE — 99999 PR PBB SHADOW E&M-EST. PATIENT-LVL V: ICD-10-PCS | Mod: PBBFAC,GC,, | Performed by: STUDENT IN AN ORGANIZED HEALTH CARE EDUCATION/TRAINING PROGRAM

## 2021-05-06 PROCEDURE — 96416 CHEMO PROLONG INFUSE W/PUMP: CPT

## 2021-05-06 PROCEDURE — 96368 THER/DIAG CONCURRENT INF: CPT

## 2021-05-06 PROCEDURE — 36415 COLL VENOUS BLD VENIPUNCTURE: CPT | Performed by: INTERNAL MEDICINE

## 2021-05-06 PROCEDURE — 85025 COMPLETE CBC W/AUTO DIFF WBC: CPT | Performed by: STUDENT IN AN ORGANIZED HEALTH CARE EDUCATION/TRAINING PROGRAM

## 2021-05-06 PROCEDURE — 80053 COMPREHEN METABOLIC PANEL: CPT | Performed by: STUDENT IN AN ORGANIZED HEALTH CARE EDUCATION/TRAINING PROGRAM

## 2021-05-06 PROCEDURE — 99215 PR OFFICE/OUTPT VISIT, EST, LEVL V, 40-54 MIN: ICD-10-PCS | Mod: S$PBB,GC,, | Performed by: STUDENT IN AN ORGANIZED HEALTH CARE EDUCATION/TRAINING PROGRAM

## 2021-05-06 PROCEDURE — 96415 CHEMO IV INFUSION ADDL HR: CPT

## 2021-05-06 PROCEDURE — 96417 CHEMO IV INFUS EACH ADDL SEQ: CPT

## 2021-05-06 PROCEDURE — 85610 PROTHROMBIN TIME: CPT | Performed by: INTERNAL MEDICINE

## 2021-05-06 PROCEDURE — 96413 CHEMO IV INFUSION 1 HR: CPT

## 2021-05-06 PROCEDURE — 93793 PR ANTICOAGULANT MGMT FOR PT TAKING WARFARIN: ICD-10-PCS | Mod: ,,,

## 2021-05-06 PROCEDURE — 93793 ANTICOAG MGMT PT WARFARIN: CPT | Mod: ,,,

## 2021-05-06 PROCEDURE — 99215 OFFICE O/P EST HI 40 MIN: CPT | Mod: S$PBB,GC,, | Performed by: STUDENT IN AN ORGANIZED HEALTH CARE EDUCATION/TRAINING PROGRAM

## 2021-05-06 RX ORDER — EPINEPHRINE 0.3 MG/.3ML
0.3 INJECTION SUBCUTANEOUS ONCE AS NEEDED
Status: CANCELLED | OUTPATIENT
Start: 2021-05-06

## 2021-05-06 RX ORDER — DIPHENHYDRAMINE HYDROCHLORIDE 50 MG/ML
50 INJECTION INTRAMUSCULAR; INTRAVENOUS ONCE AS NEEDED
Status: DISCONTINUED | OUTPATIENT
Start: 2021-05-06 | End: 2021-05-06 | Stop reason: HOSPADM

## 2021-05-06 RX ORDER — EPINEPHRINE 0.3 MG/.3ML
0.3 INJECTION SUBCUTANEOUS ONCE AS NEEDED
Status: DISCONTINUED | OUTPATIENT
Start: 2021-05-06 | End: 2021-05-06 | Stop reason: HOSPADM

## 2021-05-06 RX ORDER — HEPARIN 100 UNIT/ML
500 SYRINGE INTRAVENOUS
Status: CANCELLED | OUTPATIENT
Start: 2021-05-06

## 2021-05-06 RX ORDER — DIPHENHYDRAMINE HYDROCHLORIDE 50 MG/ML
50 INJECTION INTRAMUSCULAR; INTRAVENOUS ONCE AS NEEDED
Status: CANCELLED | OUTPATIENT
Start: 2021-05-06

## 2021-05-06 RX ORDER — HEPARIN 100 UNIT/ML
500 SYRINGE INTRAVENOUS
Status: DISCONTINUED | OUTPATIENT
Start: 2021-05-06 | End: 2021-05-06 | Stop reason: HOSPADM

## 2021-05-06 RX ORDER — SODIUM CHLORIDE 0.9 % (FLUSH) 0.9 %
10 SYRINGE (ML) INJECTION
Status: CANCELLED | OUTPATIENT
Start: 2021-05-08

## 2021-05-06 RX ORDER — SODIUM CHLORIDE 0.9 % (FLUSH) 0.9 %
10 SYRINGE (ML) INJECTION
Status: CANCELLED | OUTPATIENT
Start: 2021-05-06

## 2021-05-06 RX ORDER — HEPARIN 100 UNIT/ML
500 SYRINGE INTRAVENOUS
Status: CANCELLED | OUTPATIENT
Start: 2021-05-08

## 2021-05-06 RX ORDER — SODIUM CHLORIDE 0.9 % (FLUSH) 0.9 %
10 SYRINGE (ML) INJECTION
Status: DISCONTINUED | OUTPATIENT
Start: 2021-05-06 | End: 2021-05-06 | Stop reason: HOSPADM

## 2021-05-06 RX ADMIN — SODIUM CHLORIDE: 9 INJECTION, SOLUTION INTRAVENOUS at 10:05

## 2021-05-06 RX ADMIN — LEUCOVORIN CALCIUM 655 MG: 350 INJECTION, POWDER, LYOPHILIZED, FOR SOLUTION INTRAMUSCULAR; INTRAVENOUS at 12:05

## 2021-05-06 RX ADMIN — OXALIPLATIN 139 MG: 50 INJECTION, SOLUTION INTRAVENOUS at 12:05

## 2021-05-06 RX ADMIN — DEXAMETHASONE SODIUM PHOSPHATE 0.25 MG: 4 INJECTION, SOLUTION INTRA-ARTICULAR; INTRALESIONAL; INTRAMUSCULAR; INTRAVENOUS; SOFT TISSUE at 10:05

## 2021-05-06 RX ADMIN — BEVACIZUMAB 285 MG: 100 INJECTION, SOLUTION INTRAVENOUS at 11:05

## 2021-05-06 RX ADMIN — FLUOROURACIL 3935 MG: 50 INJECTION, SOLUTION INTRAVENOUS at 03:05

## 2021-05-06 RX ADMIN — DEXTROSE: 50 INJECTION, SOLUTION INTRAVENOUS at 12:05

## 2021-05-07 ENCOUNTER — DOCUMENTATION ONLY (OUTPATIENT)
Dept: HEMATOLOGY/ONCOLOGY | Facility: CLINIC | Age: 71
End: 2021-05-07

## 2021-05-08 ENCOUNTER — INFUSION (OUTPATIENT)
Dept: INFUSION THERAPY | Facility: HOSPITAL | Age: 71
End: 2021-05-08
Attending: STUDENT IN AN ORGANIZED HEALTH CARE EDUCATION/TRAINING PROGRAM
Payer: MEDICARE

## 2021-05-08 VITALS
SYSTOLIC BLOOD PRESSURE: 122 MMHG | HEART RATE: 94 BPM | DIASTOLIC BLOOD PRESSURE: 75 MMHG | OXYGEN SATURATION: 100 % | TEMPERATURE: 98 F | RESPIRATION RATE: 18 BRPM

## 2021-05-08 DIAGNOSIS — C78.7 METASTATIC COLON CANCER TO LIVER: Primary | ICD-10-CM

## 2021-05-08 DIAGNOSIS — C18.9 METASTATIC COLON CANCER TO LIVER: Primary | ICD-10-CM

## 2021-05-08 PROCEDURE — 25000003 PHARM REV CODE 250: Performed by: INTERNAL MEDICINE

## 2021-05-08 PROCEDURE — 63600175 PHARM REV CODE 636 W HCPCS: Performed by: INTERNAL MEDICINE

## 2021-05-08 PROCEDURE — A4216 STERILE WATER/SALINE, 10 ML: HCPCS | Performed by: INTERNAL MEDICINE

## 2021-05-08 RX ORDER — HEPARIN 100 UNIT/ML
500 SYRINGE INTRAVENOUS
Status: DISCONTINUED | OUTPATIENT
Start: 2021-05-08 | End: 2021-05-08 | Stop reason: HOSPADM

## 2021-05-08 RX ORDER — SODIUM CHLORIDE 0.9 % (FLUSH) 0.9 %
10 SYRINGE (ML) INJECTION
Status: DISCONTINUED | OUTPATIENT
Start: 2021-05-08 | End: 2021-05-08 | Stop reason: HOSPADM

## 2021-05-08 RX ADMIN — HEPARIN 500 UNITS: 100 SYRINGE at 11:05

## 2021-05-08 RX ADMIN — Medication 10 ML: at 11:05

## 2021-05-10 ENCOUNTER — EXTERNAL HOME HEALTH (OUTPATIENT)
Dept: HOME HEALTH SERVICES | Facility: HOSPITAL | Age: 71
End: 2021-05-10
Payer: MEDICARE

## 2021-05-10 ENCOUNTER — ANTI-COAG VISIT (OUTPATIENT)
Dept: CARDIOLOGY | Facility: CLINIC | Age: 71
End: 2021-05-10
Payer: MEDICARE

## 2021-05-10 ENCOUNTER — DOCUMENTATION ONLY (OUTPATIENT)
Dept: HEMATOLOGY/ONCOLOGY | Facility: CLINIC | Age: 71
End: 2021-05-10

## 2021-05-10 DIAGNOSIS — K55.069 MESENTERIC VEIN THROMBOSIS: Primary | ICD-10-CM

## 2021-05-10 LAB — INR PPP: 2.4

## 2021-05-10 PROCEDURE — 93793 ANTICOAG MGMT PT WARFARIN: CPT | Mod: ,,,

## 2021-05-10 PROCEDURE — 93793 PR ANTICOAGULANT MGMT FOR PT TAKING WARFARIN: ICD-10-PCS | Mod: ,,,

## 2021-05-12 LAB
FINAL PATHOLOGIC DIAGNOSIS: ABNORMAL
GROSS: ABNORMAL
Lab: ABNORMAL
SUPPLEMENTAL DIAGNOSIS: ABNORMAL

## 2021-05-13 ENCOUNTER — ANTI-COAG VISIT (OUTPATIENT)
Dept: CARDIOLOGY | Facility: CLINIC | Age: 71
End: 2021-05-13
Payer: MEDICARE

## 2021-05-13 DIAGNOSIS — K55.069 MESENTERIC VEIN THROMBOSIS: Primary | ICD-10-CM

## 2021-05-13 LAB — INR PPP: 1.9

## 2021-05-13 PROCEDURE — 93793 PR ANTICOAGULANT MGMT FOR PT TAKING WARFARIN: ICD-10-PCS | Mod: ,,,

## 2021-05-13 PROCEDURE — 93793 ANTICOAG MGMT PT WARFARIN: CPT | Mod: ,,,

## 2021-05-14 ENCOUNTER — DOCUMENT SCAN (OUTPATIENT)
Dept: HOME HEALTH SERVICES | Facility: HOSPITAL | Age: 71
End: 2021-05-14
Payer: MEDICARE

## 2021-05-20 ENCOUNTER — INFUSION (OUTPATIENT)
Dept: INFUSION THERAPY | Facility: HOSPITAL | Age: 71
End: 2021-05-20
Attending: STUDENT IN AN ORGANIZED HEALTH CARE EDUCATION/TRAINING PROGRAM
Payer: MEDICARE

## 2021-05-20 ENCOUNTER — ANTI-COAG VISIT (OUTPATIENT)
Dept: CARDIOLOGY | Facility: CLINIC | Age: 71
End: 2021-05-20
Payer: MEDICARE

## 2021-05-20 ENCOUNTER — OFFICE VISIT (OUTPATIENT)
Dept: HEMATOLOGY/ONCOLOGY | Facility: CLINIC | Age: 71
End: 2021-05-20
Payer: MEDICARE

## 2021-05-20 VITALS
SYSTOLIC BLOOD PRESSURE: 122 MMHG | WEIGHT: 127.44 LBS | BODY MASS INDEX: 20 KG/M2 | HEIGHT: 67 IN | RESPIRATION RATE: 18 BRPM | HEART RATE: 86 BPM | OXYGEN SATURATION: 100 % | TEMPERATURE: 98 F | DIASTOLIC BLOOD PRESSURE: 70 MMHG

## 2021-05-20 VITALS
HEART RATE: 70 BPM | RESPIRATION RATE: 17 BRPM | OXYGEN SATURATION: 100 % | DIASTOLIC BLOOD PRESSURE: 69 MMHG | SYSTOLIC BLOOD PRESSURE: 125 MMHG | TEMPERATURE: 98 F

## 2021-05-20 DIAGNOSIS — C18.9 METASTATIC COLON CANCER TO LIVER: Primary | ICD-10-CM

## 2021-05-20 DIAGNOSIS — K55.069 SUPERIOR MESENTERIC VEIN THROMBOSIS: ICD-10-CM

## 2021-05-20 DIAGNOSIS — K55.069 MESENTERIC VEIN THROMBOSIS: Primary | ICD-10-CM

## 2021-05-20 DIAGNOSIS — C78.7 METASTATIC COLON CANCER TO LIVER: Primary | ICD-10-CM

## 2021-05-20 DIAGNOSIS — I10 HYPERTENSION, UNSPECIFIED TYPE: ICD-10-CM

## 2021-05-20 PROCEDURE — 63600175 PHARM REV CODE 636 W HCPCS: Performed by: INTERNAL MEDICINE

## 2021-05-20 PROCEDURE — 96416 CHEMO PROLONG INFUSE W/PUMP: CPT

## 2021-05-20 PROCEDURE — 93793 PR ANTICOAGULANT MGMT FOR PT TAKING WARFARIN: ICD-10-PCS | Mod: ,,,

## 2021-05-20 PROCEDURE — 96417 CHEMO IV INFUS EACH ADDL SEQ: CPT

## 2021-05-20 PROCEDURE — 96413 CHEMO IV INFUSION 1 HR: CPT

## 2021-05-20 PROCEDURE — 96415 CHEMO IV INFUSION ADDL HR: CPT

## 2021-05-20 PROCEDURE — 99999 PR PBB SHADOW E&M-EST. PATIENT-LVL IV: ICD-10-PCS | Mod: PBBFAC,GC,, | Performed by: STUDENT IN AN ORGANIZED HEALTH CARE EDUCATION/TRAINING PROGRAM

## 2021-05-20 PROCEDURE — 96367 TX/PROPH/DG ADDL SEQ IV INF: CPT

## 2021-05-20 PROCEDURE — 96368 THER/DIAG CONCURRENT INF: CPT

## 2021-05-20 PROCEDURE — 25000003 PHARM REV CODE 250: Performed by: INTERNAL MEDICINE

## 2021-05-20 PROCEDURE — 93793 ANTICOAG MGMT PT WARFARIN: CPT | Mod: ,,,

## 2021-05-20 PROCEDURE — 99215 PR OFFICE/OUTPT VISIT, EST, LEVL V, 40-54 MIN: ICD-10-PCS | Mod: S$PBB,GC,, | Performed by: STUDENT IN AN ORGANIZED HEALTH CARE EDUCATION/TRAINING PROGRAM

## 2021-05-20 PROCEDURE — 99214 OFFICE O/P EST MOD 30 MIN: CPT | Mod: PBBFAC,25 | Performed by: STUDENT IN AN ORGANIZED HEALTH CARE EDUCATION/TRAINING PROGRAM

## 2021-05-20 PROCEDURE — 99215 OFFICE O/P EST HI 40 MIN: CPT | Mod: S$PBB,GC,, | Performed by: STUDENT IN AN ORGANIZED HEALTH CARE EDUCATION/TRAINING PROGRAM

## 2021-05-20 PROCEDURE — 99999 PR PBB SHADOW E&M-EST. PATIENT-LVL IV: CPT | Mod: PBBFAC,GC,, | Performed by: STUDENT IN AN ORGANIZED HEALTH CARE EDUCATION/TRAINING PROGRAM

## 2021-05-20 RX ORDER — HEPARIN 100 UNIT/ML
500 SYRINGE INTRAVENOUS
Status: CANCELLED | OUTPATIENT
Start: 2021-05-20

## 2021-05-20 RX ORDER — HEPARIN 100 UNIT/ML
500 SYRINGE INTRAVENOUS
Status: CANCELLED | OUTPATIENT
Start: 2021-05-22

## 2021-05-20 RX ORDER — DIPHENHYDRAMINE HYDROCHLORIDE 50 MG/ML
50 INJECTION INTRAMUSCULAR; INTRAVENOUS ONCE AS NEEDED
Status: CANCELLED | OUTPATIENT
Start: 2021-05-20

## 2021-05-20 RX ORDER — EPINEPHRINE 0.3 MG/.3ML
0.3 INJECTION SUBCUTANEOUS ONCE AS NEEDED
Status: CANCELLED | OUTPATIENT
Start: 2021-05-20

## 2021-05-20 RX ORDER — SODIUM CHLORIDE 0.9 % (FLUSH) 0.9 %
10 SYRINGE (ML) INJECTION
Status: CANCELLED | OUTPATIENT
Start: 2021-05-22

## 2021-05-20 RX ORDER — EPINEPHRINE 0.3 MG/.3ML
0.3 INJECTION SUBCUTANEOUS ONCE AS NEEDED
Status: DISCONTINUED | OUTPATIENT
Start: 2021-05-20 | End: 2021-05-20 | Stop reason: HOSPADM

## 2021-05-20 RX ORDER — WARFARIN SODIUM 5 MG/1
5 TABLET ORAL DAILY
Qty: 30 TABLET | Refills: 3 | Status: SHIPPED | OUTPATIENT
Start: 2021-05-20 | End: 2021-07-15 | Stop reason: ALTCHOICE

## 2021-05-20 RX ORDER — HEPARIN 100 UNIT/ML
500 SYRINGE INTRAVENOUS
Status: DISCONTINUED | OUTPATIENT
Start: 2021-05-20 | End: 2021-05-20 | Stop reason: HOSPADM

## 2021-05-20 RX ORDER — SODIUM CHLORIDE 0.9 % (FLUSH) 0.9 %
10 SYRINGE (ML) INJECTION
Status: DISCONTINUED | OUTPATIENT
Start: 2021-05-20 | End: 2021-05-20 | Stop reason: HOSPADM

## 2021-05-20 RX ORDER — SODIUM CHLORIDE 0.9 % (FLUSH) 0.9 %
10 SYRINGE (ML) INJECTION
Status: CANCELLED | OUTPATIENT
Start: 2021-05-20

## 2021-05-20 RX ORDER — AMLODIPINE BESYLATE 10 MG/1
10 TABLET ORAL DAILY
Qty: 30 TABLET | Refills: 3 | Status: SHIPPED | OUTPATIENT
Start: 2021-05-20 | End: 2021-09-20 | Stop reason: SDUPTHER

## 2021-05-20 RX ORDER — DIPHENHYDRAMINE HYDROCHLORIDE 50 MG/ML
50 INJECTION INTRAMUSCULAR; INTRAVENOUS ONCE AS NEEDED
Status: DISCONTINUED | OUTPATIENT
Start: 2021-05-20 | End: 2021-05-20 | Stop reason: HOSPADM

## 2021-05-20 RX ADMIN — BEVACIZUMAB 285 MG: 100 INJECTION, SOLUTION INTRAVENOUS at 12:05

## 2021-05-20 RX ADMIN — SODIUM CHLORIDE: 9 INJECTION, SOLUTION INTRAVENOUS at 12:05

## 2021-05-20 RX ADMIN — LEUCOVORIN CALCIUM 655 MG: 350 INJECTION, POWDER, LYOPHILIZED, FOR SOLUTION INTRAMUSCULAR; INTRAVENOUS at 02:05

## 2021-05-20 RX ADMIN — DEXTROSE: 50 INJECTION, SOLUTION INTRAVENOUS at 12:05

## 2021-05-20 RX ADMIN — DEXAMETHASONE SODIUM PHOSPHATE 0.25 MG: 4 INJECTION, SOLUTION INTRA-ARTICULAR; INTRALESIONAL; INTRAMUSCULAR; INTRAVENOUS; SOFT TISSUE at 12:05

## 2021-05-20 RX ADMIN — FLUOROURACIL 3935 MG: 50 INJECTION, SOLUTION INTRAVENOUS at 04:05

## 2021-05-20 RX ADMIN — OXALIPLATIN 139 MG: 5 INJECTION, SOLUTION INTRAVENOUS at 02:05

## 2021-05-22 ENCOUNTER — INFUSION (OUTPATIENT)
Dept: INFUSION THERAPY | Facility: HOSPITAL | Age: 71
End: 2021-05-22
Attending: STUDENT IN AN ORGANIZED HEALTH CARE EDUCATION/TRAINING PROGRAM
Payer: MEDICARE

## 2021-05-22 VITALS
RESPIRATION RATE: 18 BRPM | TEMPERATURE: 98 F | HEART RATE: 68 BPM | SYSTOLIC BLOOD PRESSURE: 132 MMHG | DIASTOLIC BLOOD PRESSURE: 84 MMHG

## 2021-05-22 DIAGNOSIS — C18.9 METASTATIC COLON CANCER TO LIVER: Primary | ICD-10-CM

## 2021-05-22 DIAGNOSIS — C78.7 METASTATIC COLON CANCER TO LIVER: Primary | ICD-10-CM

## 2021-05-22 PROCEDURE — A4216 STERILE WATER/SALINE, 10 ML: HCPCS | Performed by: INTERNAL MEDICINE

## 2021-05-22 PROCEDURE — 25000003 PHARM REV CODE 250: Performed by: INTERNAL MEDICINE

## 2021-05-22 PROCEDURE — 63600175 PHARM REV CODE 636 W HCPCS: Performed by: INTERNAL MEDICINE

## 2021-05-22 RX ORDER — SODIUM CHLORIDE 0.9 % (FLUSH) 0.9 %
10 SYRINGE (ML) INJECTION
Status: DISCONTINUED | OUTPATIENT
Start: 2021-05-22 | End: 2021-05-22 | Stop reason: HOSPADM

## 2021-05-22 RX ORDER — HEPARIN 100 UNIT/ML
500 SYRINGE INTRAVENOUS
Status: DISCONTINUED | OUTPATIENT
Start: 2021-05-22 | End: 2021-05-22 | Stop reason: HOSPADM

## 2021-05-22 RX ADMIN — HEPARIN 500 UNITS: 100 SYRINGE at 10:05

## 2021-05-22 RX ADMIN — Medication 10 ML: at 10:05

## 2021-05-27 ENCOUNTER — ANTI-COAG VISIT (OUTPATIENT)
Dept: CARDIOLOGY | Facility: CLINIC | Age: 71
End: 2021-05-27
Payer: MEDICARE

## 2021-05-27 DIAGNOSIS — K55.069 MESENTERIC VEIN THROMBOSIS: Primary | ICD-10-CM

## 2021-05-27 LAB — INR PPP: 3.7

## 2021-05-27 PROCEDURE — 93793 ANTICOAG MGMT PT WARFARIN: CPT | Mod: ,,,

## 2021-05-27 PROCEDURE — 93793 PR ANTICOAGULANT MGMT FOR PT TAKING WARFARIN: ICD-10-PCS | Mod: ,,,

## 2021-05-28 ENCOUNTER — DOCUMENT SCAN (OUTPATIENT)
Dept: HOME HEALTH SERVICES | Facility: HOSPITAL | Age: 71
End: 2021-05-28
Payer: MEDICARE

## 2021-06-03 ENCOUNTER — OFFICE VISIT (OUTPATIENT)
Dept: HEMATOLOGY/ONCOLOGY | Facility: CLINIC | Age: 71
End: 2021-06-03
Payer: MEDICARE

## 2021-06-03 ENCOUNTER — TELEPHONE (OUTPATIENT)
Dept: HEMATOLOGY/ONCOLOGY | Facility: CLINIC | Age: 71
End: 2021-06-03

## 2021-06-03 ENCOUNTER — ANTI-COAG VISIT (OUTPATIENT)
Dept: CARDIOLOGY | Facility: CLINIC | Age: 71
End: 2021-06-03
Payer: MEDICARE

## 2021-06-03 ENCOUNTER — INFUSION (OUTPATIENT)
Dept: INFUSION THERAPY | Facility: HOSPITAL | Age: 71
End: 2021-06-03
Attending: STUDENT IN AN ORGANIZED HEALTH CARE EDUCATION/TRAINING PROGRAM
Payer: MEDICARE

## 2021-06-03 VITALS
HEART RATE: 81 BPM | DIASTOLIC BLOOD PRESSURE: 72 MMHG | TEMPERATURE: 98 F | SYSTOLIC BLOOD PRESSURE: 114 MMHG | WEIGHT: 130.75 LBS | BODY MASS INDEX: 20.52 KG/M2 | HEIGHT: 67 IN | RESPIRATION RATE: 18 BRPM

## 2021-06-03 VITALS
BODY MASS INDEX: 20.52 KG/M2 | OXYGEN SATURATION: 100 % | SYSTOLIC BLOOD PRESSURE: 124 MMHG | HEIGHT: 67 IN | WEIGHT: 130.75 LBS | HEART RATE: 89 BPM | DIASTOLIC BLOOD PRESSURE: 70 MMHG | TEMPERATURE: 98 F | RESPIRATION RATE: 18 BRPM

## 2021-06-03 DIAGNOSIS — C18.9 COLON CANCER METASTASIZED TO LIVER: Primary | ICD-10-CM

## 2021-06-03 DIAGNOSIS — C78.7 METASTATIC COLON CANCER TO LIVER: Primary | ICD-10-CM

## 2021-06-03 DIAGNOSIS — C18.9 METASTATIC COLON CANCER TO LIVER: Primary | ICD-10-CM

## 2021-06-03 DIAGNOSIS — K55.069 MESENTERIC VEIN THROMBOSIS: Primary | ICD-10-CM

## 2021-06-03 DIAGNOSIS — C78.7 COLON CANCER METASTASIZED TO LIVER: Primary | ICD-10-CM

## 2021-06-03 DIAGNOSIS — K55.069 SUPERIOR MESENTERIC VEIN THROMBOSIS: ICD-10-CM

## 2021-06-03 DIAGNOSIS — I10 HYPERTENSION, UNSPECIFIED TYPE: ICD-10-CM

## 2021-06-03 DIAGNOSIS — R97.0 ELEVATED CARCINOEMBRYONIC ANTIGEN (CEA): ICD-10-CM

## 2021-06-03 PROCEDURE — 96415 CHEMO IV INFUSION ADDL HR: CPT

## 2021-06-03 PROCEDURE — 99999 PR PBB SHADOW E&M-EST. PATIENT-LVL III: CPT | Mod: PBBFAC,GC,, | Performed by: STUDENT IN AN ORGANIZED HEALTH CARE EDUCATION/TRAINING PROGRAM

## 2021-06-03 PROCEDURE — 96409 CHEMO IV PUSH SNGL DRUG: CPT

## 2021-06-03 PROCEDURE — 25000003 PHARM REV CODE 250: Performed by: INTERNAL MEDICINE

## 2021-06-03 PROCEDURE — 63600175 PHARM REV CODE 636 W HCPCS: Performed by: INTERNAL MEDICINE

## 2021-06-03 PROCEDURE — 99215 PR OFFICE/OUTPT VISIT, EST, LEVL V, 40-54 MIN: ICD-10-PCS | Mod: S$PBB,GC,, | Performed by: STUDENT IN AN ORGANIZED HEALTH CARE EDUCATION/TRAINING PROGRAM

## 2021-06-03 PROCEDURE — 96365 THER/PROPH/DIAG IV INF INIT: CPT

## 2021-06-03 PROCEDURE — 93793 ANTICOAG MGMT PT WARFARIN: CPT | Mod: ,,,

## 2021-06-03 PROCEDURE — 93793 PR ANTICOAGULANT MGMT FOR PT TAKING WARFARIN: ICD-10-PCS | Mod: ,,,

## 2021-06-03 PROCEDURE — 96416 CHEMO PROLONG INFUSE W/PUMP: CPT

## 2021-06-03 PROCEDURE — 96413 CHEMO IV INFUSION 1 HR: CPT

## 2021-06-03 PROCEDURE — 96367 TX/PROPH/DG ADDL SEQ IV INF: CPT

## 2021-06-03 PROCEDURE — 96368 THER/DIAG CONCURRENT INF: CPT

## 2021-06-03 PROCEDURE — 96411 CHEMO IV PUSH ADDL DRUG: CPT

## 2021-06-03 PROCEDURE — 99999 PR PBB SHADOW E&M-EST. PATIENT-LVL III: ICD-10-PCS | Mod: PBBFAC,GC,, | Performed by: STUDENT IN AN ORGANIZED HEALTH CARE EDUCATION/TRAINING PROGRAM

## 2021-06-03 PROCEDURE — 99215 OFFICE O/P EST HI 40 MIN: CPT | Mod: S$PBB,GC,, | Performed by: STUDENT IN AN ORGANIZED HEALTH CARE EDUCATION/TRAINING PROGRAM

## 2021-06-03 PROCEDURE — 99213 OFFICE O/P EST LOW 20 MIN: CPT | Mod: PBBFAC,25 | Performed by: STUDENT IN AN ORGANIZED HEALTH CARE EDUCATION/TRAINING PROGRAM

## 2021-06-03 RX ORDER — EPINEPHRINE 0.3 MG/.3ML
0.3 INJECTION SUBCUTANEOUS ONCE AS NEEDED
Status: CANCELLED | OUTPATIENT
Start: 2021-06-03

## 2021-06-03 RX ORDER — SODIUM CHLORIDE 0.9 % (FLUSH) 0.9 %
10 SYRINGE (ML) INJECTION
Status: DISCONTINUED | OUTPATIENT
Start: 2021-06-03 | End: 2021-06-03 | Stop reason: HOSPADM

## 2021-06-03 RX ORDER — SODIUM CHLORIDE 0.9 % (FLUSH) 0.9 %
10 SYRINGE (ML) INJECTION
Status: CANCELLED | OUTPATIENT
Start: 2021-06-03

## 2021-06-03 RX ORDER — SODIUM CHLORIDE 0.9 % (FLUSH) 0.9 %
10 SYRINGE (ML) INJECTION
Status: CANCELLED | OUTPATIENT
Start: 2021-06-05

## 2021-06-03 RX ORDER — DIPHENHYDRAMINE HYDROCHLORIDE 50 MG/ML
50 INJECTION INTRAMUSCULAR; INTRAVENOUS ONCE AS NEEDED
Status: DISCONTINUED | OUTPATIENT
Start: 2021-06-03 | End: 2021-06-03 | Stop reason: HOSPADM

## 2021-06-03 RX ORDER — DIPHENHYDRAMINE HYDROCHLORIDE 50 MG/ML
50 INJECTION INTRAMUSCULAR; INTRAVENOUS ONCE AS NEEDED
Status: CANCELLED | OUTPATIENT
Start: 2021-06-03

## 2021-06-03 RX ORDER — HEPARIN 100 UNIT/ML
500 SYRINGE INTRAVENOUS
Status: CANCELLED | OUTPATIENT
Start: 2021-06-03

## 2021-06-03 RX ORDER — EPINEPHRINE 0.3 MG/.3ML
0.3 INJECTION SUBCUTANEOUS ONCE AS NEEDED
Status: DISCONTINUED | OUTPATIENT
Start: 2021-06-03 | End: 2021-06-03 | Stop reason: HOSPADM

## 2021-06-03 RX ORDER — HEPARIN 100 UNIT/ML
500 SYRINGE INTRAVENOUS
Status: DISCONTINUED | OUTPATIENT
Start: 2021-06-03 | End: 2021-06-03 | Stop reason: HOSPADM

## 2021-06-03 RX ORDER — HEPARIN 100 UNIT/ML
500 SYRINGE INTRAVENOUS
Status: CANCELLED | OUTPATIENT
Start: 2021-06-05

## 2021-06-03 RX ADMIN — DEXTROSE 655 MG: 5 SOLUTION INTRAVENOUS at 11:06

## 2021-06-03 RX ADMIN — DEXAMETHASONE SODIUM PHOSPHATE 0.25 MG: 4 INJECTION, SOLUTION INTRA-ARTICULAR; INTRALESIONAL; INTRAMUSCULAR; INTRAVENOUS; SOFT TISSUE at 10:06

## 2021-06-03 RX ADMIN — OXALIPLATIN 139 MG: 5 INJECTION, SOLUTION INTRAVENOUS at 11:06

## 2021-06-03 RX ADMIN — BEVACIZUMAB 285 MG: 100 INJECTION, SOLUTION INTRAVENOUS at 10:06

## 2021-06-03 RX ADMIN — FLUOROURACIL 3935 MG: 50 INJECTION, SOLUTION INTRAVENOUS at 01:06

## 2021-06-05 ENCOUNTER — INFUSION (OUTPATIENT)
Dept: INFUSION THERAPY | Facility: HOSPITAL | Age: 71
End: 2021-06-05
Attending: STUDENT IN AN ORGANIZED HEALTH CARE EDUCATION/TRAINING PROGRAM
Payer: MEDICARE

## 2021-06-05 VITALS
DIASTOLIC BLOOD PRESSURE: 65 MMHG | SYSTOLIC BLOOD PRESSURE: 127 MMHG | RESPIRATION RATE: 18 BRPM | TEMPERATURE: 98 F | HEART RATE: 83 BPM

## 2021-06-05 DIAGNOSIS — C18.9 METASTATIC COLON CANCER TO LIVER: Primary | ICD-10-CM

## 2021-06-05 DIAGNOSIS — C78.7 METASTATIC COLON CANCER TO LIVER: Primary | ICD-10-CM

## 2021-06-05 PROCEDURE — A4216 STERILE WATER/SALINE, 10 ML: HCPCS | Performed by: INTERNAL MEDICINE

## 2021-06-05 PROCEDURE — 25000003 PHARM REV CODE 250: Performed by: INTERNAL MEDICINE

## 2021-06-05 PROCEDURE — 63600175 PHARM REV CODE 636 W HCPCS: Performed by: INTERNAL MEDICINE

## 2021-06-05 RX ORDER — SODIUM CHLORIDE 0.9 % (FLUSH) 0.9 %
10 SYRINGE (ML) INJECTION
Status: DISCONTINUED | OUTPATIENT
Start: 2021-06-05 | End: 2021-06-05 | Stop reason: HOSPADM

## 2021-06-05 RX ORDER — HEPARIN 100 UNIT/ML
500 SYRINGE INTRAVENOUS
Status: DISCONTINUED | OUTPATIENT
Start: 2021-06-05 | End: 2021-06-05 | Stop reason: HOSPADM

## 2021-06-05 RX ADMIN — HEPARIN 500 UNITS: 100 SYRINGE at 10:06

## 2021-06-05 RX ADMIN — Medication 10 ML: at 10:06

## 2021-06-10 ENCOUNTER — ANTI-COAG VISIT (OUTPATIENT)
Dept: CARDIOLOGY | Facility: CLINIC | Age: 71
End: 2021-06-10
Payer: MEDICARE

## 2021-06-10 ENCOUNTER — TELEPHONE (OUTPATIENT)
Dept: PALLIATIVE MEDICINE | Facility: CLINIC | Age: 71
End: 2021-06-10

## 2021-06-10 DIAGNOSIS — K55.069 MESENTERIC VEIN THROMBOSIS: Primary | ICD-10-CM

## 2021-06-10 DIAGNOSIS — Z79.01 LONG TERM (CURRENT) USE OF ANTICOAGULANTS: ICD-10-CM

## 2021-06-10 LAB — INR PPP: 4

## 2021-06-10 PROCEDURE — 93793 PR ANTICOAGULANT MGMT FOR PT TAKING WARFARIN: ICD-10-PCS | Mod: ,,,

## 2021-06-10 PROCEDURE — 93793 ANTICOAG MGMT PT WARFARIN: CPT | Mod: ,,,

## 2021-06-11 ENCOUNTER — TELEPHONE (OUTPATIENT)
Dept: INTERNAL MEDICINE | Facility: CLINIC | Age: 71
End: 2021-06-11

## 2021-06-15 ENCOUNTER — DOCUMENT SCAN (OUTPATIENT)
Dept: HOME HEALTH SERVICES | Facility: HOSPITAL | Age: 71
End: 2021-06-15
Payer: MEDICARE

## 2021-06-17 ENCOUNTER — INFUSION (OUTPATIENT)
Dept: INFUSION THERAPY | Facility: HOSPITAL | Age: 71
End: 2021-06-17
Payer: MEDICARE

## 2021-06-17 ENCOUNTER — ANTI-COAG VISIT (OUTPATIENT)
Dept: CARDIOLOGY | Facility: CLINIC | Age: 71
End: 2021-06-17
Payer: MEDICARE

## 2021-06-17 ENCOUNTER — OFFICE VISIT (OUTPATIENT)
Dept: HEMATOLOGY/ONCOLOGY | Facility: CLINIC | Age: 71
End: 2021-06-17
Payer: MEDICARE

## 2021-06-17 ENCOUNTER — LAB VISIT (OUTPATIENT)
Dept: LAB | Facility: HOSPITAL | Age: 71
End: 2021-06-17
Payer: MEDICARE

## 2021-06-17 VITALS
DIASTOLIC BLOOD PRESSURE: 75 MMHG | SYSTOLIC BLOOD PRESSURE: 151 MMHG | HEART RATE: 70 BPM | TEMPERATURE: 98 F | OXYGEN SATURATION: 99 % | RESPIRATION RATE: 18 BRPM

## 2021-06-17 VITALS
RESPIRATION RATE: 18 BRPM | OXYGEN SATURATION: 100 % | HEART RATE: 69 BPM | SYSTOLIC BLOOD PRESSURE: 133 MMHG | TEMPERATURE: 98 F | DIASTOLIC BLOOD PRESSURE: 71 MMHG | BODY MASS INDEX: 20.59 KG/M2 | HEIGHT: 67 IN | WEIGHT: 131.19 LBS

## 2021-06-17 DIAGNOSIS — C78.7 METASTATIC COLON CANCER TO LIVER: ICD-10-CM

## 2021-06-17 DIAGNOSIS — Z79.01 LONG TERM (CURRENT) USE OF ANTICOAGULANTS: ICD-10-CM

## 2021-06-17 DIAGNOSIS — C18.9 METASTATIC COLON CANCER TO LIVER: Primary | ICD-10-CM

## 2021-06-17 DIAGNOSIS — C78.7 METASTATIC COLON CANCER TO LIVER: Primary | ICD-10-CM

## 2021-06-17 DIAGNOSIS — I10 HYPERTENSION, UNSPECIFIED TYPE: ICD-10-CM

## 2021-06-17 DIAGNOSIS — D50.0 IRON DEFICIENCY ANEMIA DUE TO CHRONIC BLOOD LOSS: ICD-10-CM

## 2021-06-17 DIAGNOSIS — C78.7 COLON CANCER METASTASIZED TO LIVER: Primary | ICD-10-CM

## 2021-06-17 DIAGNOSIS — C18.9 COLON CANCER METASTASIZED TO LIVER: Primary | ICD-10-CM

## 2021-06-17 DIAGNOSIS — K55.069 SUPERIOR MESENTERIC VEIN THROMBOSIS: ICD-10-CM

## 2021-06-17 DIAGNOSIS — K55.069 MESENTERIC VEIN THROMBOSIS: Primary | ICD-10-CM

## 2021-06-17 DIAGNOSIS — R97.0 ELEVATED CARCINOEMBRYONIC ANTIGEN (CEA): ICD-10-CM

## 2021-06-17 DIAGNOSIS — C18.9 METASTATIC COLON CANCER TO LIVER: ICD-10-CM

## 2021-06-17 LAB
ALBUMIN SERPL BCP-MCNC: 3.2 G/DL (ref 3.5–5.2)
ALP SERPL-CCNC: 91 U/L (ref 55–135)
ALT SERPL W/O P-5'-P-CCNC: 25 U/L (ref 10–44)
ANION GAP SERPL CALC-SCNC: 10 MMOL/L (ref 8–16)
AST SERPL-CCNC: 25 U/L (ref 10–40)
BILIRUB SERPL-MCNC: 0.5 MG/DL (ref 0.1–1)
BUN SERPL-MCNC: 18 MG/DL (ref 8–23)
CALCIUM SERPL-MCNC: 9.3 MG/DL (ref 8.7–10.5)
CEA SERPL-MCNC: 6.3 NG/ML (ref 0–5)
CHLORIDE SERPL-SCNC: 108 MMOL/L (ref 95–110)
CO2 SERPL-SCNC: 22 MMOL/L (ref 23–29)
CREAT SERPL-MCNC: 0.9 MG/DL (ref 0.5–1.4)
ERYTHROCYTE [DISTWIDTH] IN BLOOD BY AUTOMATED COUNT: 20.3 % (ref 11.5–14.5)
EST. GFR  (AFRICAN AMERICAN): >60 ML/MIN/1.73 M^2
EST. GFR  (NON AFRICAN AMERICAN): >60 ML/MIN/1.73 M^2
GLUCOSE SERPL-MCNC: 113 MG/DL (ref 70–110)
HCT VFR BLD AUTO: 34.9 % (ref 40–54)
HGB BLD-MCNC: 9.8 G/DL (ref 14–18)
IMM GRANULOCYTES # BLD AUTO: 0.02 K/UL (ref 0–0.04)
INR PPP: 2.6 (ref 0.8–1.2)
MCH RBC QN AUTO: 23 PG (ref 27–31)
MCHC RBC AUTO-ENTMCNC: 28.1 G/DL (ref 32–36)
MCV RBC AUTO: 82 FL (ref 82–98)
NEUTROPHILS # BLD AUTO: 4.3 K/UL (ref 1.8–7.7)
PLATELET # BLD AUTO: 346 K/UL (ref 150–450)
PMV BLD AUTO: 9.3 FL (ref 9.2–12.9)
POTASSIUM SERPL-SCNC: 5 MMOL/L (ref 3.5–5.1)
PROT SERPL-MCNC: 7.3 G/DL (ref 6–8.4)
PROTHROMBIN TIME: 26.8 SEC (ref 9–12.5)
RBC # BLD AUTO: 4.27 M/UL (ref 4.6–6.2)
SODIUM SERPL-SCNC: 140 MMOL/L (ref 136–145)
WBC # BLD AUTO: 8.18 K/UL (ref 3.9–12.7)

## 2021-06-17 PROCEDURE — 36415 COLL VENOUS BLD VENIPUNCTURE: CPT | Performed by: INTERNAL MEDICINE

## 2021-06-17 PROCEDURE — 85610 PROTHROMBIN TIME: CPT | Performed by: INTERNAL MEDICINE

## 2021-06-17 PROCEDURE — 96367 TX/PROPH/DG ADDL SEQ IV INF: CPT

## 2021-06-17 PROCEDURE — 99999 PR PBB SHADOW E&M-EST. PATIENT-LVL IV: CPT | Mod: PBBFAC,GC,, | Performed by: STUDENT IN AN ORGANIZED HEALTH CARE EDUCATION/TRAINING PROGRAM

## 2021-06-17 PROCEDURE — 96413 CHEMO IV INFUSION 1 HR: CPT

## 2021-06-17 PROCEDURE — 93793 ANTICOAG MGMT PT WARFARIN: CPT | Mod: ,,,

## 2021-06-17 PROCEDURE — 99999 PR PBB SHADOW E&M-EST. PATIENT-LVL IV: ICD-10-PCS | Mod: PBBFAC,GC,, | Performed by: STUDENT IN AN ORGANIZED HEALTH CARE EDUCATION/TRAINING PROGRAM

## 2021-06-17 PROCEDURE — 96415 CHEMO IV INFUSION ADDL HR: CPT

## 2021-06-17 PROCEDURE — 93793 PR ANTICOAGULANT MGMT FOR PT TAKING WARFARIN: ICD-10-PCS | Mod: ,,,

## 2021-06-17 PROCEDURE — 25000003 PHARM REV CODE 250: Performed by: INTERNAL MEDICINE

## 2021-06-17 PROCEDURE — 80053 COMPREHEN METABOLIC PANEL: CPT | Performed by: STUDENT IN AN ORGANIZED HEALTH CARE EDUCATION/TRAINING PROGRAM

## 2021-06-17 PROCEDURE — 96417 CHEMO IV INFUS EACH ADDL SEQ: CPT

## 2021-06-17 PROCEDURE — 63600175 PHARM REV CODE 636 W HCPCS: Performed by: INTERNAL MEDICINE

## 2021-06-17 PROCEDURE — 99214 OFFICE O/P EST MOD 30 MIN: CPT | Mod: PBBFAC,25 | Performed by: STUDENT IN AN ORGANIZED HEALTH CARE EDUCATION/TRAINING PROGRAM

## 2021-06-17 PROCEDURE — 85027 COMPLETE CBC AUTOMATED: CPT | Performed by: STUDENT IN AN ORGANIZED HEALTH CARE EDUCATION/TRAINING PROGRAM

## 2021-06-17 PROCEDURE — 82378 CARCINOEMBRYONIC ANTIGEN: CPT | Performed by: STUDENT IN AN ORGANIZED HEALTH CARE EDUCATION/TRAINING PROGRAM

## 2021-06-17 PROCEDURE — 99215 OFFICE O/P EST HI 40 MIN: CPT | Mod: S$PBB,GC,, | Performed by: STUDENT IN AN ORGANIZED HEALTH CARE EDUCATION/TRAINING PROGRAM

## 2021-06-17 PROCEDURE — 96416 CHEMO PROLONG INFUSE W/PUMP: CPT

## 2021-06-17 PROCEDURE — 99215 PR OFFICE/OUTPT VISIT, EST, LEVL V, 40-54 MIN: ICD-10-PCS | Mod: S$PBB,GC,, | Performed by: STUDENT IN AN ORGANIZED HEALTH CARE EDUCATION/TRAINING PROGRAM

## 2021-06-17 RX ORDER — HEPARIN 100 UNIT/ML
500 SYRINGE INTRAVENOUS
Status: DISCONTINUED | OUTPATIENT
Start: 2021-06-17 | End: 2021-06-17 | Stop reason: HOSPADM

## 2021-06-17 RX ORDER — EPINEPHRINE 0.3 MG/.3ML
0.3 INJECTION SUBCUTANEOUS ONCE AS NEEDED
Status: DISCONTINUED | OUTPATIENT
Start: 2021-06-17 | End: 2021-06-17 | Stop reason: HOSPADM

## 2021-06-17 RX ORDER — DIPHENHYDRAMINE HYDROCHLORIDE 50 MG/ML
50 INJECTION INTRAMUSCULAR; INTRAVENOUS ONCE AS NEEDED
Status: DISCONTINUED | OUTPATIENT
Start: 2021-06-17 | End: 2021-06-17 | Stop reason: HOSPADM

## 2021-06-17 RX ORDER — HEPARIN 100 UNIT/ML
500 SYRINGE INTRAVENOUS
Status: CANCELLED | OUTPATIENT
Start: 2021-06-17

## 2021-06-17 RX ORDER — SODIUM CHLORIDE 0.9 % (FLUSH) 0.9 %
10 SYRINGE (ML) INJECTION
Status: CANCELLED | OUTPATIENT
Start: 2021-06-17

## 2021-06-17 RX ORDER — SODIUM CHLORIDE 0.9 % (FLUSH) 0.9 %
10 SYRINGE (ML) INJECTION
Status: DISCONTINUED | OUTPATIENT
Start: 2021-06-17 | End: 2021-06-17 | Stop reason: HOSPADM

## 2021-06-17 RX ORDER — EPINEPHRINE 0.3 MG/.3ML
0.3 INJECTION SUBCUTANEOUS ONCE AS NEEDED
Status: CANCELLED | OUTPATIENT
Start: 2021-06-17

## 2021-06-17 RX ORDER — SODIUM CHLORIDE 0.9 % (FLUSH) 0.9 %
10 SYRINGE (ML) INJECTION
Status: CANCELLED | OUTPATIENT
Start: 2021-06-19

## 2021-06-17 RX ORDER — HEPARIN 100 UNIT/ML
500 SYRINGE INTRAVENOUS
Status: CANCELLED | OUTPATIENT
Start: 2021-06-19

## 2021-06-17 RX ORDER — DIPHENHYDRAMINE HYDROCHLORIDE 50 MG/ML
50 INJECTION INTRAMUSCULAR; INTRAVENOUS ONCE AS NEEDED
Status: CANCELLED | OUTPATIENT
Start: 2021-06-17

## 2021-06-17 RX ADMIN — OXALIPLATIN 139 MG: 5 INJECTION, SOLUTION INTRAVENOUS at 11:06

## 2021-06-17 RX ADMIN — FLUOROURACIL 3935 MG: 50 INJECTION, SOLUTION INTRAVENOUS at 02:06

## 2021-06-17 RX ADMIN — SODIUM CHLORIDE: 9 INJECTION, SOLUTION INTRAVENOUS at 10:06

## 2021-06-17 RX ADMIN — BEVACIZUMAB 285 MG: 100 INJECTION, SOLUTION INTRAVENOUS at 10:06

## 2021-06-17 RX ADMIN — DEXAMETHASONE SODIUM PHOSPHATE: 4 INJECTION, SOLUTION INTRA-ARTICULAR; INTRALESIONAL; INTRAMUSCULAR; INTRAVENOUS; SOFT TISSUE at 11:06

## 2021-06-17 RX ADMIN — DEXTROSE: 50 INJECTION, SOLUTION INTRAVENOUS at 11:06

## 2021-06-19 ENCOUNTER — INFUSION (OUTPATIENT)
Dept: INFUSION THERAPY | Facility: HOSPITAL | Age: 71
End: 2021-06-19
Attending: STUDENT IN AN ORGANIZED HEALTH CARE EDUCATION/TRAINING PROGRAM
Payer: MEDICARE

## 2021-06-19 VITALS
TEMPERATURE: 97 F | DIASTOLIC BLOOD PRESSURE: 80 MMHG | BODY MASS INDEX: 20.59 KG/M2 | WEIGHT: 131.19 LBS | RESPIRATION RATE: 18 BRPM | SYSTOLIC BLOOD PRESSURE: 138 MMHG | HEIGHT: 67 IN | HEART RATE: 65 BPM

## 2021-06-19 DIAGNOSIS — C18.9 METASTATIC COLON CANCER TO LIVER: Primary | ICD-10-CM

## 2021-06-19 DIAGNOSIS — C78.7 METASTATIC COLON CANCER TO LIVER: Primary | ICD-10-CM

## 2021-06-19 PROCEDURE — 63600175 PHARM REV CODE 636 W HCPCS: Performed by: INTERNAL MEDICINE

## 2021-06-19 PROCEDURE — A4216 STERILE WATER/SALINE, 10 ML: HCPCS | Performed by: INTERNAL MEDICINE

## 2021-06-19 PROCEDURE — 25000003 PHARM REV CODE 250: Performed by: INTERNAL MEDICINE

## 2021-06-19 RX ORDER — HEPARIN 100 UNIT/ML
500 SYRINGE INTRAVENOUS
Status: DISCONTINUED | OUTPATIENT
Start: 2021-06-19 | End: 2021-06-19 | Stop reason: HOSPADM

## 2021-06-19 RX ORDER — SODIUM CHLORIDE 0.9 % (FLUSH) 0.9 %
10 SYRINGE (ML) INJECTION
Status: DISCONTINUED | OUTPATIENT
Start: 2021-06-19 | End: 2021-06-19 | Stop reason: HOSPADM

## 2021-06-19 RX ADMIN — Medication 10 ML: at 10:06

## 2021-06-19 RX ADMIN — HEPARIN 500 UNITS: 100 SYRINGE at 10:06

## 2021-06-22 PROCEDURE — G0179 PR HOME HEALTH MD RECERTIFICATION: ICD-10-PCS | Mod: ,,, | Performed by: INTERNAL MEDICINE

## 2021-06-22 PROCEDURE — G0179 MD RECERTIFICATION HHA PT: HCPCS | Mod: ,,, | Performed by: INTERNAL MEDICINE

## 2021-06-25 ENCOUNTER — ANTI-COAG VISIT (OUTPATIENT)
Dept: CARDIOLOGY | Facility: CLINIC | Age: 71
End: 2021-06-25
Payer: MEDICARE

## 2021-06-25 DIAGNOSIS — K55.069 MESENTERIC VEIN THROMBOSIS: Primary | ICD-10-CM

## 2021-06-25 LAB — INR PPP: 3.2

## 2021-06-25 PROCEDURE — 93793 ANTICOAG MGMT PT WARFARIN: CPT | Mod: ,,,

## 2021-06-25 PROCEDURE — 93793 PR ANTICOAGULANT MGMT FOR PT TAKING WARFARIN: ICD-10-PCS | Mod: ,,,

## 2021-07-01 ENCOUNTER — INFUSION (OUTPATIENT)
Dept: INFUSION THERAPY | Facility: HOSPITAL | Age: 71
End: 2021-07-01
Payer: MEDICARE

## 2021-07-01 ENCOUNTER — OFFICE VISIT (OUTPATIENT)
Dept: HEMATOLOGY/ONCOLOGY | Facility: CLINIC | Age: 71
End: 2021-07-01
Payer: MEDICARE

## 2021-07-01 ENCOUNTER — ANTI-COAG VISIT (OUTPATIENT)
Dept: CARDIOLOGY | Facility: CLINIC | Age: 71
End: 2021-07-01
Payer: MEDICARE

## 2021-07-01 ENCOUNTER — LAB VISIT (OUTPATIENT)
Dept: LAB | Facility: HOSPITAL | Age: 71
End: 2021-07-01
Payer: MEDICARE

## 2021-07-01 VITALS
RESPIRATION RATE: 18 BRPM | HEART RATE: 70 BPM | HEIGHT: 67 IN | BODY MASS INDEX: 20.59 KG/M2 | WEIGHT: 131.19 LBS | DIASTOLIC BLOOD PRESSURE: 75 MMHG | OXYGEN SATURATION: 100 % | SYSTOLIC BLOOD PRESSURE: 128 MMHG

## 2021-07-01 VITALS — RESPIRATION RATE: 18 BRPM | HEART RATE: 68 BPM | SYSTOLIC BLOOD PRESSURE: 130 MMHG | DIASTOLIC BLOOD PRESSURE: 70 MMHG

## 2021-07-01 DIAGNOSIS — C18.9 COLON CANCER METASTASIZED TO LIVER: Primary | ICD-10-CM

## 2021-07-01 DIAGNOSIS — C78.7 METASTATIC COLON CANCER TO LIVER: ICD-10-CM

## 2021-07-01 DIAGNOSIS — K55.069 MESENTERIC VEIN THROMBOSIS: Primary | ICD-10-CM

## 2021-07-01 DIAGNOSIS — C78.7 METASTATIC COLON CANCER TO LIVER: Primary | ICD-10-CM

## 2021-07-01 DIAGNOSIS — C78.7 COLON CANCER METASTASIZED TO LIVER: Primary | ICD-10-CM

## 2021-07-01 DIAGNOSIS — K55.069 MESENTERIC VEIN THROMBOSIS: ICD-10-CM

## 2021-07-01 DIAGNOSIS — C18.9 METASTATIC COLON CANCER TO LIVER: ICD-10-CM

## 2021-07-01 DIAGNOSIS — C26.9 MALIGNANT NEOPLASM OF ILL-DEFINED SITES WITHIN THE DIGESTIVE SYSTEM: ICD-10-CM

## 2021-07-01 DIAGNOSIS — K55.069 SUPERIOR MESENTERIC VEIN THROMBOSIS: ICD-10-CM

## 2021-07-01 DIAGNOSIS — C18.9 METASTATIC COLON CANCER TO LIVER: Primary | ICD-10-CM

## 2021-07-01 DIAGNOSIS — I10 HYPERTENSION, UNSPECIFIED TYPE: ICD-10-CM

## 2021-07-01 DIAGNOSIS — D50.0 IRON DEFICIENCY ANEMIA DUE TO CHRONIC BLOOD LOSS: ICD-10-CM

## 2021-07-01 LAB
ALBUMIN SERPL BCP-MCNC: 2.9 G/DL (ref 3.5–5.2)
ALP SERPL-CCNC: 97 U/L (ref 55–135)
ALT SERPL W/O P-5'-P-CCNC: 32 U/L (ref 10–44)
ANION GAP SERPL CALC-SCNC: 8 MMOL/L (ref 8–16)
AST SERPL-CCNC: 32 U/L (ref 10–40)
BILIRUB SERPL-MCNC: 0.4 MG/DL (ref 0.1–1)
BILIRUB UR QL STRIP: NEGATIVE
BUN SERPL-MCNC: 9 MG/DL (ref 8–23)
CALCIUM SERPL-MCNC: 9.3 MG/DL (ref 8.7–10.5)
CHLORIDE SERPL-SCNC: 110 MMOL/L (ref 95–110)
CLARITY UR REFRACT.AUTO: CLEAR
CO2 SERPL-SCNC: 24 MMOL/L (ref 23–29)
COLOR UR AUTO: YELLOW
CREAT SERPL-MCNC: 1.1 MG/DL (ref 0.5–1.4)
ERYTHROCYTE [DISTWIDTH] IN BLOOD BY AUTOMATED COUNT: 20.7 % (ref 11.5–14.5)
EST. GFR  (AFRICAN AMERICAN): >60 ML/MIN/1.73 M^2
EST. GFR  (NON AFRICAN AMERICAN): >60 ML/MIN/1.73 M^2
GLUCOSE SERPL-MCNC: 89 MG/DL (ref 70–110)
GLUCOSE UR QL STRIP: NEGATIVE
HCT VFR BLD AUTO: 35.4 % (ref 40–54)
HGB BLD-MCNC: 10.1 G/DL (ref 14–18)
HGB UR QL STRIP: NEGATIVE
IMM GRANULOCYTES # BLD AUTO: 0.01 K/UL (ref 0–0.04)
INR PPP: 3 (ref 0.8–1.2)
KETONES UR QL STRIP: NEGATIVE
LEUKOCYTE ESTERASE UR QL STRIP: NEGATIVE
MCH RBC QN AUTO: 24 PG (ref 27–31)
MCHC RBC AUTO-ENTMCNC: 28.5 G/DL (ref 32–36)
MCV RBC AUTO: 84 FL (ref 82–98)
MICROSCOPIC COMMENT: NORMAL
NEUTROPHILS # BLD AUTO: 4.4 K/UL (ref 1.8–7.7)
NITRITE UR QL STRIP: NEGATIVE
PH UR STRIP: 6 [PH] (ref 5–8)
PLATELET # BLD AUTO: 371 K/UL (ref 150–450)
PMV BLD AUTO: 9.5 FL (ref 9.2–12.9)
POTASSIUM SERPL-SCNC: 5.2 MMOL/L (ref 3.5–5.1)
PROT SERPL-MCNC: 7 G/DL (ref 6–8.4)
PROT UR QL STRIP: NEGATIVE
PROTHROMBIN TIME: 30.3 SEC (ref 9–12.5)
RBC # BLD AUTO: 4.21 M/UL (ref 4.6–6.2)
RBC #/AREA URNS AUTO: 1 /HPF (ref 0–4)
SODIUM SERPL-SCNC: 142 MMOL/L (ref 136–145)
SP GR UR STRIP: 1.01 (ref 1–1.03)
URN SPEC COLLECT METH UR: NORMAL
WBC # BLD AUTO: 9.7 K/UL (ref 3.9–12.7)
WBC #/AREA URNS AUTO: 1 /HPF (ref 0–5)

## 2021-07-01 PROCEDURE — 99999 PR PBB SHADOW E&M-EST. PATIENT-LVL III: CPT | Mod: PBBFAC,GC,, | Performed by: STUDENT IN AN ORGANIZED HEALTH CARE EDUCATION/TRAINING PROGRAM

## 2021-07-01 PROCEDURE — 96417 CHEMO IV INFUS EACH ADDL SEQ: CPT

## 2021-07-01 PROCEDURE — 93793 ANTICOAG MGMT PT WARFARIN: CPT | Mod: ,,,

## 2021-07-01 PROCEDURE — 96415 CHEMO IV INFUSION ADDL HR: CPT

## 2021-07-01 PROCEDURE — 96416 CHEMO PROLONG INFUSE W/PUMP: CPT

## 2021-07-01 PROCEDURE — 36415 COLL VENOUS BLD VENIPUNCTURE: CPT | Performed by: INTERNAL MEDICINE

## 2021-07-01 PROCEDURE — 99213 OFFICE O/P EST LOW 20 MIN: CPT | Mod: PBBFAC,25 | Performed by: STUDENT IN AN ORGANIZED HEALTH CARE EDUCATION/TRAINING PROGRAM

## 2021-07-01 PROCEDURE — 96413 CHEMO IV INFUSION 1 HR: CPT

## 2021-07-01 PROCEDURE — 80053 COMPREHEN METABOLIC PANEL: CPT | Performed by: STUDENT IN AN ORGANIZED HEALTH CARE EDUCATION/TRAINING PROGRAM

## 2021-07-01 PROCEDURE — 99999 PR PBB SHADOW E&M-EST. PATIENT-LVL III: ICD-10-PCS | Mod: PBBFAC,GC,, | Performed by: STUDENT IN AN ORGANIZED HEALTH CARE EDUCATION/TRAINING PROGRAM

## 2021-07-01 PROCEDURE — 96367 TX/PROPH/DG ADDL SEQ IV INF: CPT

## 2021-07-01 PROCEDURE — 99214 PR OFFICE/OUTPT VISIT, EST, LEVL IV, 30-39 MIN: ICD-10-PCS | Mod: S$PBB,GC,, | Performed by: STUDENT IN AN ORGANIZED HEALTH CARE EDUCATION/TRAINING PROGRAM

## 2021-07-01 PROCEDURE — 85027 COMPLETE CBC AUTOMATED: CPT | Performed by: STUDENT IN AN ORGANIZED HEALTH CARE EDUCATION/TRAINING PROGRAM

## 2021-07-01 PROCEDURE — 63600175 PHARM REV CODE 636 W HCPCS: Mod: JG | Performed by: INTERNAL MEDICINE

## 2021-07-01 PROCEDURE — 93793 PR ANTICOAGULANT MGMT FOR PT TAKING WARFARIN: ICD-10-PCS | Mod: ,,,

## 2021-07-01 PROCEDURE — 25000003 PHARM REV CODE 250: Performed by: INTERNAL MEDICINE

## 2021-07-01 PROCEDURE — 81001 URINALYSIS AUTO W/SCOPE: CPT | Performed by: STUDENT IN AN ORGANIZED HEALTH CARE EDUCATION/TRAINING PROGRAM

## 2021-07-01 PROCEDURE — 99214 OFFICE O/P EST MOD 30 MIN: CPT | Mod: S$PBB,GC,, | Performed by: STUDENT IN AN ORGANIZED HEALTH CARE EDUCATION/TRAINING PROGRAM

## 2021-07-01 PROCEDURE — 85610 PROTHROMBIN TIME: CPT | Performed by: INTERNAL MEDICINE

## 2021-07-01 RX ORDER — SODIUM CHLORIDE 0.9 % (FLUSH) 0.9 %
10 SYRINGE (ML) INJECTION
Status: DISCONTINUED | OUTPATIENT
Start: 2021-07-01 | End: 2021-07-01 | Stop reason: HOSPADM

## 2021-07-01 RX ORDER — EPINEPHRINE 0.3 MG/.3ML
0.3 INJECTION SUBCUTANEOUS ONCE AS NEEDED
Status: DISCONTINUED | OUTPATIENT
Start: 2021-07-01 | End: 2021-07-01 | Stop reason: HOSPADM

## 2021-07-01 RX ORDER — HEPARIN 100 UNIT/ML
500 SYRINGE INTRAVENOUS
Status: CANCELLED | OUTPATIENT
Start: 2021-07-03

## 2021-07-01 RX ORDER — HEPARIN 100 UNIT/ML
500 SYRINGE INTRAVENOUS
Status: DISCONTINUED | OUTPATIENT
Start: 2021-07-01 | End: 2021-07-01 | Stop reason: HOSPADM

## 2021-07-01 RX ORDER — EPINEPHRINE 0.3 MG/.3ML
0.3 INJECTION SUBCUTANEOUS ONCE AS NEEDED
Status: CANCELLED | OUTPATIENT
Start: 2021-07-01

## 2021-07-01 RX ORDER — SODIUM CHLORIDE 0.9 % (FLUSH) 0.9 %
10 SYRINGE (ML) INJECTION
Status: CANCELLED | OUTPATIENT
Start: 2021-07-01

## 2021-07-01 RX ORDER — SODIUM CHLORIDE 0.9 % (FLUSH) 0.9 %
10 SYRINGE (ML) INJECTION
Status: CANCELLED | OUTPATIENT
Start: 2021-07-03

## 2021-07-01 RX ORDER — DIPHENHYDRAMINE HYDROCHLORIDE 50 MG/ML
50 INJECTION INTRAMUSCULAR; INTRAVENOUS ONCE AS NEEDED
Status: DISCONTINUED | OUTPATIENT
Start: 2021-07-01 | End: 2021-07-01 | Stop reason: HOSPADM

## 2021-07-01 RX ORDER — DIPHENHYDRAMINE HYDROCHLORIDE 50 MG/ML
50 INJECTION INTRAMUSCULAR; INTRAVENOUS ONCE AS NEEDED
Status: CANCELLED | OUTPATIENT
Start: 2021-07-01

## 2021-07-01 RX ORDER — HEPARIN 100 UNIT/ML
500 SYRINGE INTRAVENOUS
Status: CANCELLED | OUTPATIENT
Start: 2021-07-01

## 2021-07-01 RX ADMIN — BEVACIZUMAB 285 MG: 100 INJECTION, SOLUTION INTRAVENOUS at 02:07

## 2021-07-01 RX ADMIN — FLUOROURACIL 3935 MG: 50 INJECTION, SOLUTION INTRAVENOUS at 05:07

## 2021-07-01 RX ADMIN — SODIUM CHLORIDE: 9 INJECTION, SOLUTION INTRAVENOUS at 12:07

## 2021-07-01 RX ADMIN — OXALIPLATIN 139 MG: 5 INJECTION, SOLUTION INTRAVENOUS at 03:07

## 2021-07-01 RX ADMIN — PALONOSETRON HYDROCHLORIDE: 0.25 INJECTION, SOLUTION INTRAVENOUS at 01:07

## 2021-07-01 RX ADMIN — DEXTROSE: 50 INJECTION, SOLUTION INTRAVENOUS at 02:07

## 2021-07-03 ENCOUNTER — INFUSION (OUTPATIENT)
Dept: INFUSION THERAPY | Facility: HOSPITAL | Age: 71
End: 2021-07-03
Attending: STUDENT IN AN ORGANIZED HEALTH CARE EDUCATION/TRAINING PROGRAM
Payer: MEDICARE

## 2021-07-03 VITALS
TEMPERATURE: 98 F | DIASTOLIC BLOOD PRESSURE: 74 MMHG | RESPIRATION RATE: 18 BRPM | HEART RATE: 63 BPM | SYSTOLIC BLOOD PRESSURE: 135 MMHG

## 2021-07-03 DIAGNOSIS — C18.9 METASTATIC COLON CANCER TO LIVER: Primary | ICD-10-CM

## 2021-07-03 DIAGNOSIS — C78.7 METASTATIC COLON CANCER TO LIVER: Primary | ICD-10-CM

## 2021-07-03 PROCEDURE — 63600175 PHARM REV CODE 636 W HCPCS: Performed by: INTERNAL MEDICINE

## 2021-07-03 PROCEDURE — 25000003 PHARM REV CODE 250: Performed by: INTERNAL MEDICINE

## 2021-07-03 PROCEDURE — A4216 STERILE WATER/SALINE, 10 ML: HCPCS | Performed by: INTERNAL MEDICINE

## 2021-07-03 RX ORDER — SODIUM CHLORIDE 0.9 % (FLUSH) 0.9 %
10 SYRINGE (ML) INJECTION
Status: DISCONTINUED | OUTPATIENT
Start: 2021-07-03 | End: 2021-07-03 | Stop reason: HOSPADM

## 2021-07-03 RX ORDER — HEPARIN 100 UNIT/ML
500 SYRINGE INTRAVENOUS
Status: DISCONTINUED | OUTPATIENT
Start: 2021-07-03 | End: 2021-07-03 | Stop reason: HOSPADM

## 2021-07-03 RX ADMIN — Medication 10 ML: at 10:07

## 2021-07-03 RX ADMIN — HEPARIN 500 UNITS: 100 SYRINGE at 10:07

## 2021-07-07 LAB — INR PPP: 3.3

## 2021-07-08 ENCOUNTER — ANTI-COAG VISIT (OUTPATIENT)
Dept: CARDIOLOGY | Facility: CLINIC | Age: 71
End: 2021-07-08
Payer: MEDICARE

## 2021-07-08 DIAGNOSIS — K55.069 MESENTERIC VEIN THROMBOSIS: Primary | ICD-10-CM

## 2021-07-08 PROCEDURE — 93793 ANTICOAG MGMT PT WARFARIN: CPT | Mod: ,,,

## 2021-07-08 PROCEDURE — 93793 PR ANTICOAGULANT MGMT FOR PT TAKING WARFARIN: ICD-10-PCS | Mod: ,,,

## 2021-07-15 ENCOUNTER — INFUSION (OUTPATIENT)
Dept: INFUSION THERAPY | Facility: HOSPITAL | Age: 71
End: 2021-07-15
Payer: MEDICARE

## 2021-07-15 ENCOUNTER — OFFICE VISIT (OUTPATIENT)
Dept: HEMATOLOGY/ONCOLOGY | Facility: CLINIC | Age: 71
End: 2021-07-15
Payer: MEDICARE

## 2021-07-15 VITALS
RESPIRATION RATE: 16 BRPM | BODY MASS INDEX: 20.59 KG/M2 | HEART RATE: 80 BPM | WEIGHT: 131.19 LBS | HEIGHT: 67 IN | OXYGEN SATURATION: 100 % | DIASTOLIC BLOOD PRESSURE: 73 MMHG | SYSTOLIC BLOOD PRESSURE: 122 MMHG

## 2021-07-15 VITALS
TEMPERATURE: 97 F | HEIGHT: 67 IN | DIASTOLIC BLOOD PRESSURE: 71 MMHG | BODY MASS INDEX: 20.59 KG/M2 | HEART RATE: 67 BPM | WEIGHT: 131.19 LBS | RESPIRATION RATE: 18 BRPM | SYSTOLIC BLOOD PRESSURE: 125 MMHG

## 2021-07-15 DIAGNOSIS — C18.9 COLON CANCER METASTASIZED TO LIVER: Primary | ICD-10-CM

## 2021-07-15 DIAGNOSIS — C18.9 METASTATIC COLON CANCER TO LIVER: Primary | ICD-10-CM

## 2021-07-15 DIAGNOSIS — C78.7 COLON CANCER METASTASIZED TO LIVER: Primary | ICD-10-CM

## 2021-07-15 DIAGNOSIS — I10 HYPERTENSION, UNSPECIFIED TYPE: ICD-10-CM

## 2021-07-15 DIAGNOSIS — D50.0 IRON DEFICIENCY ANEMIA DUE TO CHRONIC BLOOD LOSS: ICD-10-CM

## 2021-07-15 DIAGNOSIS — K55.069 SUPERIOR MESENTERIC VEIN THROMBOSIS: ICD-10-CM

## 2021-07-15 DIAGNOSIS — C78.7 METASTATIC COLON CANCER TO LIVER: Primary | ICD-10-CM

## 2021-07-15 PROCEDURE — 96416 CHEMO PROLONG INFUSE W/PUMP: CPT

## 2021-07-15 PROCEDURE — 99214 OFFICE O/P EST MOD 30 MIN: CPT | Mod: PBBFAC,25 | Performed by: STUDENT IN AN ORGANIZED HEALTH CARE EDUCATION/TRAINING PROGRAM

## 2021-07-15 PROCEDURE — 96367 TX/PROPH/DG ADDL SEQ IV INF: CPT

## 2021-07-15 PROCEDURE — 99214 PR OFFICE/OUTPT VISIT, EST, LEVL IV, 30-39 MIN: ICD-10-PCS | Mod: S$PBB,GC,, | Performed by: STUDENT IN AN ORGANIZED HEALTH CARE EDUCATION/TRAINING PROGRAM

## 2021-07-15 PROCEDURE — 99214 OFFICE O/P EST MOD 30 MIN: CPT | Mod: S$PBB,GC,, | Performed by: STUDENT IN AN ORGANIZED HEALTH CARE EDUCATION/TRAINING PROGRAM

## 2021-07-15 PROCEDURE — 96413 CHEMO IV INFUSION 1 HR: CPT

## 2021-07-15 PROCEDURE — 99999 PR PBB SHADOW E&M-EST. PATIENT-LVL IV: CPT | Mod: PBBFAC,GC,, | Performed by: STUDENT IN AN ORGANIZED HEALTH CARE EDUCATION/TRAINING PROGRAM

## 2021-07-15 PROCEDURE — 63600175 PHARM REV CODE 636 W HCPCS: Performed by: INTERNAL MEDICINE

## 2021-07-15 PROCEDURE — 96415 CHEMO IV INFUSION ADDL HR: CPT

## 2021-07-15 PROCEDURE — 25000003 PHARM REV CODE 250: Performed by: INTERNAL MEDICINE

## 2021-07-15 PROCEDURE — 96417 CHEMO IV INFUS EACH ADDL SEQ: CPT

## 2021-07-15 PROCEDURE — 99999 PR PBB SHADOW E&M-EST. PATIENT-LVL IV: ICD-10-PCS | Mod: PBBFAC,GC,, | Performed by: STUDENT IN AN ORGANIZED HEALTH CARE EDUCATION/TRAINING PROGRAM

## 2021-07-15 RX ORDER — HEPARIN 100 UNIT/ML
500 SYRINGE INTRAVENOUS
Status: CANCELLED | OUTPATIENT
Start: 2021-07-17

## 2021-07-15 RX ORDER — EPINEPHRINE 0.3 MG/.3ML
0.3 INJECTION SUBCUTANEOUS ONCE AS NEEDED
Status: DISCONTINUED | OUTPATIENT
Start: 2021-07-15 | End: 2021-07-15 | Stop reason: HOSPADM

## 2021-07-15 RX ORDER — HEPARIN 100 UNIT/ML
500 SYRINGE INTRAVENOUS
Status: CANCELLED | OUTPATIENT
Start: 2021-07-15

## 2021-07-15 RX ORDER — DIPHENHYDRAMINE HYDROCHLORIDE 50 MG/ML
50 INJECTION INTRAMUSCULAR; INTRAVENOUS ONCE AS NEEDED
Status: CANCELLED | OUTPATIENT
Start: 2021-07-15

## 2021-07-15 RX ORDER — HEPARIN 100 UNIT/ML
500 SYRINGE INTRAVENOUS
Status: DISCONTINUED | OUTPATIENT
Start: 2021-07-15 | End: 2021-07-15 | Stop reason: HOSPADM

## 2021-07-15 RX ORDER — SODIUM CHLORIDE 0.9 % (FLUSH) 0.9 %
10 SYRINGE (ML) INJECTION
Status: CANCELLED | OUTPATIENT
Start: 2021-07-17

## 2021-07-15 RX ORDER — DIPHENHYDRAMINE HYDROCHLORIDE 50 MG/ML
50 INJECTION INTRAMUSCULAR; INTRAVENOUS ONCE AS NEEDED
Status: DISCONTINUED | OUTPATIENT
Start: 2021-07-15 | End: 2021-07-15 | Stop reason: HOSPADM

## 2021-07-15 RX ORDER — SODIUM CHLORIDE 0.9 % (FLUSH) 0.9 %
10 SYRINGE (ML) INJECTION
Status: DISCONTINUED | OUTPATIENT
Start: 2021-07-15 | End: 2021-07-15 | Stop reason: HOSPADM

## 2021-07-15 RX ORDER — SODIUM CHLORIDE 0.9 % (FLUSH) 0.9 %
10 SYRINGE (ML) INJECTION
Status: CANCELLED | OUTPATIENT
Start: 2021-07-15

## 2021-07-15 RX ORDER — EPINEPHRINE 0.3 MG/.3ML
0.3 INJECTION SUBCUTANEOUS ONCE AS NEEDED
Status: CANCELLED | OUTPATIENT
Start: 2021-07-15

## 2021-07-15 RX ADMIN — OXALIPLATIN 139 MG: 5 INJECTION, SOLUTION INTRAVENOUS at 01:07

## 2021-07-15 RX ADMIN — FLUOROURACIL 3935 MG: 50 INJECTION, SOLUTION INTRAVENOUS at 03:07

## 2021-07-15 RX ADMIN — SODIUM CHLORIDE: 9 INJECTION, SOLUTION INTRAVENOUS at 12:07

## 2021-07-15 RX ADMIN — PALONOSETRON 0.25 MG: 0.25 INJECTION, SOLUTION INTRAVENOUS at 12:07

## 2021-07-15 RX ADMIN — BEVACIZUMAB 285 MG: 100 INJECTION, SOLUTION INTRAVENOUS at 12:07

## 2021-07-17 ENCOUNTER — INFUSION (OUTPATIENT)
Dept: INFUSION THERAPY | Facility: HOSPITAL | Age: 71
End: 2021-07-17
Attending: STUDENT IN AN ORGANIZED HEALTH CARE EDUCATION/TRAINING PROGRAM
Payer: MEDICARE

## 2021-07-17 VITALS
HEART RATE: 64 BPM | DIASTOLIC BLOOD PRESSURE: 73 MMHG | TEMPERATURE: 97 F | RESPIRATION RATE: 19 BRPM | SYSTOLIC BLOOD PRESSURE: 148 MMHG

## 2021-07-17 DIAGNOSIS — C18.9 METASTATIC COLON CANCER TO LIVER: Primary | ICD-10-CM

## 2021-07-17 DIAGNOSIS — C78.7 METASTATIC COLON CANCER TO LIVER: Primary | ICD-10-CM

## 2021-07-17 PROCEDURE — 25000003 PHARM REV CODE 250: Performed by: INTERNAL MEDICINE

## 2021-07-17 PROCEDURE — 63600175 PHARM REV CODE 636 W HCPCS: Performed by: INTERNAL MEDICINE

## 2021-07-17 PROCEDURE — A4216 STERILE WATER/SALINE, 10 ML: HCPCS | Performed by: INTERNAL MEDICINE

## 2021-07-17 RX ORDER — SODIUM CHLORIDE 0.9 % (FLUSH) 0.9 %
10 SYRINGE (ML) INJECTION
Status: DISCONTINUED | OUTPATIENT
Start: 2021-07-17 | End: 2021-07-17 | Stop reason: HOSPADM

## 2021-07-17 RX ORDER — HEPARIN 100 UNIT/ML
500 SYRINGE INTRAVENOUS
Status: DISCONTINUED | OUTPATIENT
Start: 2021-07-17 | End: 2021-07-17 | Stop reason: HOSPADM

## 2021-07-17 RX ADMIN — HEPARIN 500 UNITS: 100 SYRINGE at 11:07

## 2021-07-17 RX ADMIN — Medication 10 ML: at 11:07

## 2021-07-20 ENCOUNTER — TELEPHONE (OUTPATIENT)
Dept: HEMATOLOGY/ONCOLOGY | Facility: CLINIC | Age: 71
End: 2021-07-20

## 2021-07-21 ENCOUNTER — EXTERNAL HOME HEALTH (OUTPATIENT)
Dept: HOME HEALTH SERVICES | Facility: HOSPITAL | Age: 71
End: 2021-07-21
Payer: MEDICARE

## 2021-07-22 ENCOUNTER — HOSPITAL ENCOUNTER (OUTPATIENT)
Dept: RADIOLOGY | Facility: HOSPITAL | Age: 71
Discharge: HOME | End: 2021-07-22
Attending: STUDENT IN AN ORGANIZED HEALTH CARE EDUCATION/TRAINING PROGRAM
Payer: MEDICARE

## 2021-07-22 DIAGNOSIS — D50.0 IRON DEFICIENCY ANEMIA DUE TO CHRONIC BLOOD LOSS: ICD-10-CM

## 2021-07-22 DIAGNOSIS — C18.9 COLON CANCER METASTASIZED TO LIVER: ICD-10-CM

## 2021-07-22 DIAGNOSIS — C26.9 MALIGNANT NEOPLASM OF ILL-DEFINED SITES WITHIN THE DIGESTIVE SYSTEM: ICD-10-CM

## 2021-07-22 DIAGNOSIS — C78.7 COLON CANCER METASTASIZED TO LIVER: ICD-10-CM

## 2021-07-22 PROCEDURE — 71260 CT THORAX DX C+: CPT | Mod: TC

## 2021-07-22 PROCEDURE — 74177 CT ABD & PELVIS W/CONTRAST: CPT | Mod: 26,,, | Performed by: RADIOLOGY

## 2021-07-22 PROCEDURE — 74177 CT ABD & PELVIS W/CONTRAST: CPT | Mod: TC

## 2021-07-22 PROCEDURE — 71260 CT THORAX DX C+: CPT | Mod: 26,,, | Performed by: RADIOLOGY

## 2021-07-22 PROCEDURE — 74177 CT CHEST ABDOMEN PELVIS WITH CONTRAST (XPD): ICD-10-PCS | Mod: 26,,, | Performed by: RADIOLOGY

## 2021-07-22 PROCEDURE — 25500020 PHARM REV CODE 255: Performed by: STUDENT IN AN ORGANIZED HEALTH CARE EDUCATION/TRAINING PROGRAM

## 2021-07-22 PROCEDURE — 71260 CT CHEST ABDOMEN PELVIS WITH CONTRAST (XPD): ICD-10-PCS | Mod: 26,,, | Performed by: RADIOLOGY

## 2021-07-22 RX ORDER — FERROUS SULFATE 325(65) MG
325 TABLET, DELAYED RELEASE (ENTERIC COATED) ORAL 2 TIMES DAILY
Qty: 180 TABLET | Refills: 0 | Status: SHIPPED | OUTPATIENT
Start: 2021-07-22 | End: 2021-10-20

## 2021-07-22 RX ADMIN — IOHEXOL 75 ML: 350 INJECTION, SOLUTION INTRAVENOUS at 12:07

## 2021-07-24 ENCOUNTER — DOCUMENT SCAN (OUTPATIENT)
Dept: HOME HEALTH SERVICES | Facility: HOSPITAL | Age: 71
End: 2021-07-24
Payer: MEDICARE

## 2021-07-26 ENCOUNTER — IMMUNIZATION (OUTPATIENT)
Dept: INTERNAL MEDICINE | Facility: CLINIC | Age: 71
End: 2021-07-26
Payer: MEDICARE

## 2021-07-26 DIAGNOSIS — Z23 NEED FOR VACCINATION: Primary | ICD-10-CM

## 2021-07-26 PROCEDURE — 91300 COVID-19, MRNA, LNP-S, PF, 30 MCG/0.3 ML DOSE VACCINE: CPT | Mod: PBBFAC

## 2021-07-28 DIAGNOSIS — C78.7 COLON CANCER METASTASIZED TO LIVER: Primary | ICD-10-CM

## 2021-07-28 DIAGNOSIS — C18.9 COLON CANCER METASTASIZED TO LIVER: Primary | ICD-10-CM

## 2021-07-29 ENCOUNTER — OFFICE VISIT (OUTPATIENT)
Dept: HEMATOLOGY/ONCOLOGY | Facility: CLINIC | Age: 71
End: 2021-07-29
Payer: MEDICARE

## 2021-07-29 ENCOUNTER — INFUSION (OUTPATIENT)
Dept: INFUSION THERAPY | Facility: HOSPITAL | Age: 71
End: 2021-07-29
Payer: MEDICARE

## 2021-07-29 ENCOUNTER — LAB VISIT (OUTPATIENT)
Dept: LAB | Facility: HOSPITAL | Age: 71
End: 2021-07-29
Payer: MEDICARE

## 2021-07-29 VITALS
TEMPERATURE: 98 F | HEIGHT: 67 IN | HEART RATE: 59 BPM | SYSTOLIC BLOOD PRESSURE: 130 MMHG | OXYGEN SATURATION: 98 % | RESPIRATION RATE: 18 BRPM | DIASTOLIC BLOOD PRESSURE: 66 MMHG | BODY MASS INDEX: 20.93 KG/M2 | WEIGHT: 133.38 LBS

## 2021-07-29 VITALS
OXYGEN SATURATION: 100 % | SYSTOLIC BLOOD PRESSURE: 136 MMHG | BODY MASS INDEX: 20.93 KG/M2 | RESPIRATION RATE: 18 BRPM | DIASTOLIC BLOOD PRESSURE: 76 MMHG | HEIGHT: 67 IN | HEART RATE: 59 BPM | WEIGHT: 133.38 LBS

## 2021-07-29 DIAGNOSIS — C18.9 COLON CANCER METASTASIZED TO LIVER: ICD-10-CM

## 2021-07-29 DIAGNOSIS — C18.9 COLON CANCER METASTASIZED TO LIVER: Primary | ICD-10-CM

## 2021-07-29 DIAGNOSIS — D50.0 IRON DEFICIENCY ANEMIA DUE TO CHRONIC BLOOD LOSS: ICD-10-CM

## 2021-07-29 DIAGNOSIS — C18.9 METASTATIC COLON CANCER TO LIVER: Primary | ICD-10-CM

## 2021-07-29 DIAGNOSIS — C78.7 COLON CANCER METASTASIZED TO LIVER: Primary | ICD-10-CM

## 2021-07-29 DIAGNOSIS — C18.9 METASTATIC COLON CANCER TO LIVER: ICD-10-CM

## 2021-07-29 DIAGNOSIS — C78.7 COLON CANCER METASTASIZED TO LIVER: ICD-10-CM

## 2021-07-29 DIAGNOSIS — C78.7 METASTATIC COLON CANCER TO LIVER: ICD-10-CM

## 2021-07-29 DIAGNOSIS — I10 HYPERTENSION, UNSPECIFIED TYPE: ICD-10-CM

## 2021-07-29 DIAGNOSIS — C78.7 METASTATIC COLON CANCER TO LIVER: Primary | ICD-10-CM

## 2021-07-29 LAB
ALBUMIN SERPL BCP-MCNC: 3.2 G/DL (ref 3.5–5.2)
ALP SERPL-CCNC: 96 U/L (ref 55–135)
ALT SERPL W/O P-5'-P-CCNC: 34 U/L (ref 10–44)
ANION GAP SERPL CALC-SCNC: 8 MMOL/L (ref 8–16)
AST SERPL-CCNC: 38 U/L (ref 10–40)
BILIRUB SERPL-MCNC: 0.6 MG/DL (ref 0.1–1)
BUN SERPL-MCNC: 10 MG/DL (ref 8–23)
CALCIUM SERPL-MCNC: 9.8 MG/DL (ref 8.7–10.5)
CEA SERPL-MCNC: 2.9 NG/ML (ref 0–5)
CHLORIDE SERPL-SCNC: 106 MMOL/L (ref 95–110)
CO2 SERPL-SCNC: 26 MMOL/L (ref 23–29)
CREAT SERPL-MCNC: 1.1 MG/DL (ref 0.5–1.4)
ERYTHROCYTE [DISTWIDTH] IN BLOOD BY AUTOMATED COUNT: 20.9 % (ref 11.5–14.5)
EST. GFR  (AFRICAN AMERICAN): >60 ML/MIN/1.73 M^2
EST. GFR  (NON AFRICAN AMERICAN): >60 ML/MIN/1.73 M^2
GLUCOSE SERPL-MCNC: 103 MG/DL (ref 70–110)
HCT VFR BLD AUTO: 38.6 % (ref 40–54)
HGB BLD-MCNC: 11.4 G/DL (ref 14–18)
IMM GRANULOCYTES # BLD AUTO: 0.03 K/UL (ref 0–0.04)
MCH RBC QN AUTO: 25.4 PG (ref 27–31)
MCHC RBC AUTO-ENTMCNC: 29.5 G/DL (ref 32–36)
MCV RBC AUTO: 86 FL (ref 82–98)
NEUTROPHILS # BLD AUTO: 3.4 K/UL (ref 1.8–7.7)
PLATELET # BLD AUTO: 288 K/UL (ref 150–450)
PMV BLD AUTO: 9.5 FL (ref 9.2–12.9)
POTASSIUM SERPL-SCNC: 5.2 MMOL/L (ref 3.5–5.1)
PROT SERPL-MCNC: 7.5 G/DL (ref 6–8.4)
RBC # BLD AUTO: 4.49 M/UL (ref 4.6–6.2)
SODIUM SERPL-SCNC: 140 MMOL/L (ref 136–145)
WBC # BLD AUTO: 8.04 K/UL (ref 3.9–12.7)

## 2021-07-29 PROCEDURE — 96417 CHEMO IV INFUS EACH ADDL SEQ: CPT

## 2021-07-29 PROCEDURE — 99999 PR PBB SHADOW E&M-EST. PATIENT-LVL III: ICD-10-PCS | Mod: PBBFAC,GC,, | Performed by: STUDENT IN AN ORGANIZED HEALTH CARE EDUCATION/TRAINING PROGRAM

## 2021-07-29 PROCEDURE — 99213 OFFICE O/P EST LOW 20 MIN: CPT | Mod: PBBFAC,25 | Performed by: STUDENT IN AN ORGANIZED HEALTH CARE EDUCATION/TRAINING PROGRAM

## 2021-07-29 PROCEDURE — 25000003 PHARM REV CODE 250: Performed by: INTERNAL MEDICINE

## 2021-07-29 PROCEDURE — 96416 CHEMO PROLONG INFUSE W/PUMP: CPT

## 2021-07-29 PROCEDURE — 96415 CHEMO IV INFUSION ADDL HR: CPT

## 2021-07-29 PROCEDURE — 36415 COLL VENOUS BLD VENIPUNCTURE: CPT | Performed by: STUDENT IN AN ORGANIZED HEALTH CARE EDUCATION/TRAINING PROGRAM

## 2021-07-29 PROCEDURE — 96413 CHEMO IV INFUSION 1 HR: CPT

## 2021-07-29 PROCEDURE — 80053 COMPREHEN METABOLIC PANEL: CPT | Performed by: STUDENT IN AN ORGANIZED HEALTH CARE EDUCATION/TRAINING PROGRAM

## 2021-07-29 PROCEDURE — 85027 COMPLETE CBC AUTOMATED: CPT | Performed by: STUDENT IN AN ORGANIZED HEALTH CARE EDUCATION/TRAINING PROGRAM

## 2021-07-29 PROCEDURE — 63600175 PHARM REV CODE 636 W HCPCS: Performed by: INTERNAL MEDICINE

## 2021-07-29 PROCEDURE — 82378 CARCINOEMBRYONIC ANTIGEN: CPT | Performed by: STUDENT IN AN ORGANIZED HEALTH CARE EDUCATION/TRAINING PROGRAM

## 2021-07-29 PROCEDURE — 99214 OFFICE O/P EST MOD 30 MIN: CPT | Mod: S$PBB,GC,, | Performed by: STUDENT IN AN ORGANIZED HEALTH CARE EDUCATION/TRAINING PROGRAM

## 2021-07-29 PROCEDURE — 96367 TX/PROPH/DG ADDL SEQ IV INF: CPT

## 2021-07-29 PROCEDURE — 99214 PR OFFICE/OUTPT VISIT, EST, LEVL IV, 30-39 MIN: ICD-10-PCS | Mod: S$PBB,GC,, | Performed by: STUDENT IN AN ORGANIZED HEALTH CARE EDUCATION/TRAINING PROGRAM

## 2021-07-29 PROCEDURE — 99999 PR PBB SHADOW E&M-EST. PATIENT-LVL III: CPT | Mod: PBBFAC,GC,, | Performed by: STUDENT IN AN ORGANIZED HEALTH CARE EDUCATION/TRAINING PROGRAM

## 2021-07-29 RX ORDER — EPINEPHRINE 0.3 MG/.3ML
0.3 INJECTION SUBCUTANEOUS ONCE AS NEEDED
Status: DISCONTINUED | OUTPATIENT
Start: 2021-07-29 | End: 2021-07-29 | Stop reason: HOSPADM

## 2021-07-29 RX ORDER — EPINEPHRINE 0.3 MG/.3ML
0.3 INJECTION SUBCUTANEOUS ONCE AS NEEDED
Status: CANCELLED | OUTPATIENT
Start: 2021-07-29

## 2021-07-29 RX ORDER — SODIUM CHLORIDE 0.9 % (FLUSH) 0.9 %
10 SYRINGE (ML) INJECTION
Status: CANCELLED | OUTPATIENT
Start: 2021-07-31

## 2021-07-29 RX ORDER — HEPARIN 100 UNIT/ML
500 SYRINGE INTRAVENOUS
Status: CANCELLED | OUTPATIENT
Start: 2021-07-29

## 2021-07-29 RX ORDER — DIPHENHYDRAMINE HYDROCHLORIDE 50 MG/ML
50 INJECTION INTRAMUSCULAR; INTRAVENOUS ONCE AS NEEDED
Status: CANCELLED | OUTPATIENT
Start: 2021-07-29

## 2021-07-29 RX ORDER — DIPHENHYDRAMINE HYDROCHLORIDE 50 MG/ML
50 INJECTION INTRAMUSCULAR; INTRAVENOUS ONCE AS NEEDED
Status: DISCONTINUED | OUTPATIENT
Start: 2021-07-29 | End: 2021-07-29 | Stop reason: HOSPADM

## 2021-07-29 RX ORDER — HEPARIN 100 UNIT/ML
500 SYRINGE INTRAVENOUS
Status: CANCELLED | OUTPATIENT
Start: 2021-07-31

## 2021-07-29 RX ORDER — HEPARIN 100 UNIT/ML
500 SYRINGE INTRAVENOUS
Status: DISCONTINUED | OUTPATIENT
Start: 2021-07-29 | End: 2021-07-29 | Stop reason: HOSPADM

## 2021-07-29 RX ORDER — SODIUM CHLORIDE 0.9 % (FLUSH) 0.9 %
10 SYRINGE (ML) INJECTION
Status: DISCONTINUED | OUTPATIENT
Start: 2021-07-29 | End: 2021-07-29 | Stop reason: HOSPADM

## 2021-07-29 RX ORDER — SODIUM CHLORIDE 0.9 % (FLUSH) 0.9 %
10 SYRINGE (ML) INJECTION
Status: CANCELLED | OUTPATIENT
Start: 2021-07-29

## 2021-07-29 RX ADMIN — SODIUM CHLORIDE: 0.9 INJECTION, SOLUTION INTRAVENOUS at 11:07

## 2021-07-29 RX ADMIN — FLUOROURACIL 3935 MG: 50 INJECTION, SOLUTION INTRAVENOUS at 02:07

## 2021-07-29 RX ADMIN — DEXTROSE: 50 INJECTION, SOLUTION INTRAVENOUS at 12:07

## 2021-07-29 RX ADMIN — PALONOSETRON HYDROCHLORIDE 0.25 MG: 0.25 INJECTION INTRAVENOUS at 11:07

## 2021-07-29 RX ADMIN — OXALIPLATIN 139 MG: 5 INJECTION, SOLUTION, CONCENTRATE INTRAVENOUS at 12:07

## 2021-07-29 RX ADMIN — BEVACIZUMAB 285 MG: 400 INJECTION, SOLUTION INTRAVENOUS at 11:07

## 2021-07-30 ENCOUNTER — TELEPHONE (OUTPATIENT)
Dept: HEMATOLOGY/ONCOLOGY | Facility: CLINIC | Age: 71
End: 2021-07-30

## 2021-07-31 ENCOUNTER — INFUSION (OUTPATIENT)
Dept: INFUSION THERAPY | Facility: HOSPITAL | Age: 71
End: 2021-07-31
Attending: STUDENT IN AN ORGANIZED HEALTH CARE EDUCATION/TRAINING PROGRAM
Payer: MEDICARE

## 2021-07-31 VITALS
SYSTOLIC BLOOD PRESSURE: 128 MMHG | TEMPERATURE: 98 F | HEART RATE: 62 BPM | RESPIRATION RATE: 18 BRPM | DIASTOLIC BLOOD PRESSURE: 74 MMHG

## 2021-07-31 DIAGNOSIS — C78.7 METASTATIC COLON CANCER TO LIVER: Primary | ICD-10-CM

## 2021-07-31 DIAGNOSIS — C18.9 METASTATIC COLON CANCER TO LIVER: Primary | ICD-10-CM

## 2021-07-31 PROCEDURE — A4216 STERILE WATER/SALINE, 10 ML: HCPCS | Performed by: INTERNAL MEDICINE

## 2021-07-31 PROCEDURE — 25000003 PHARM REV CODE 250: Performed by: INTERNAL MEDICINE

## 2021-07-31 PROCEDURE — 63600175 PHARM REV CODE 636 W HCPCS: Performed by: INTERNAL MEDICINE

## 2021-07-31 RX ORDER — SODIUM CHLORIDE 0.9 % (FLUSH) 0.9 %
10 SYRINGE (ML) INJECTION
Status: DISCONTINUED | OUTPATIENT
Start: 2021-07-31 | End: 2021-07-31 | Stop reason: HOSPADM

## 2021-07-31 RX ORDER — HEPARIN 100 UNIT/ML
500 SYRINGE INTRAVENOUS
Status: DISCONTINUED | OUTPATIENT
Start: 2021-07-31 | End: 2021-07-31 | Stop reason: HOSPADM

## 2021-07-31 RX ADMIN — Medication 10 ML: at 12:07

## 2021-07-31 RX ADMIN — HEPARIN 500 UNITS: 100 SYRINGE at 12:07

## 2021-08-12 ENCOUNTER — INFUSION (OUTPATIENT)
Dept: INFUSION THERAPY | Facility: HOSPITAL | Age: 71
End: 2021-08-12
Payer: MEDICARE

## 2021-08-12 ENCOUNTER — OFFICE VISIT (OUTPATIENT)
Dept: HEMATOLOGY/ONCOLOGY | Facility: CLINIC | Age: 71
End: 2021-08-12
Payer: MEDICARE

## 2021-08-12 VITALS
SYSTOLIC BLOOD PRESSURE: 133 MMHG | DIASTOLIC BLOOD PRESSURE: 74 MMHG | TEMPERATURE: 98 F | RESPIRATION RATE: 18 BRPM | HEART RATE: 75 BPM

## 2021-08-12 VITALS
WEIGHT: 131.81 LBS | SYSTOLIC BLOOD PRESSURE: 140 MMHG | DIASTOLIC BLOOD PRESSURE: 85 MMHG | BODY MASS INDEX: 20.69 KG/M2 | HEART RATE: 73 BPM | RESPIRATION RATE: 16 BRPM | TEMPERATURE: 98 F | OXYGEN SATURATION: 99 % | HEIGHT: 67 IN

## 2021-08-12 DIAGNOSIS — C78.7 METASTATIC COLON CANCER TO LIVER: ICD-10-CM

## 2021-08-12 DIAGNOSIS — C18.9 COLON CANCER METASTASIZED TO LIVER: Primary | ICD-10-CM

## 2021-08-12 DIAGNOSIS — C78.7 COLON CANCER METASTASIZED TO LIVER: Primary | ICD-10-CM

## 2021-08-12 DIAGNOSIS — D64.81 ANEMIA ASSOCIATED WITH CHEMOTHERAPY: ICD-10-CM

## 2021-08-12 DIAGNOSIS — C78.7 METASTATIC COLON CANCER TO LIVER: Primary | ICD-10-CM

## 2021-08-12 DIAGNOSIS — T45.1X5A ANEMIA ASSOCIATED WITH CHEMOTHERAPY: ICD-10-CM

## 2021-08-12 DIAGNOSIS — C18.9 METASTATIC COLON CANCER TO LIVER: ICD-10-CM

## 2021-08-12 DIAGNOSIS — I10 HYPERTENSION, UNSPECIFIED TYPE: ICD-10-CM

## 2021-08-12 DIAGNOSIS — C18.9 METASTATIC COLON CANCER TO LIVER: Primary | ICD-10-CM

## 2021-08-12 PROCEDURE — 25000003 PHARM REV CODE 250: Performed by: INTERNAL MEDICINE

## 2021-08-12 PROCEDURE — 99999 PR PBB SHADOW E&M-EST. PATIENT-LVL III: ICD-10-PCS | Mod: PBBFAC,GC,, | Performed by: STUDENT IN AN ORGANIZED HEALTH CARE EDUCATION/TRAINING PROGRAM

## 2021-08-12 PROCEDURE — 99213 OFFICE O/P EST LOW 20 MIN: CPT | Mod: PBBFAC,25 | Performed by: STUDENT IN AN ORGANIZED HEALTH CARE EDUCATION/TRAINING PROGRAM

## 2021-08-12 PROCEDURE — 99999 PR PBB SHADOW E&M-EST. PATIENT-LVL III: CPT | Mod: PBBFAC,GC,, | Performed by: STUDENT IN AN ORGANIZED HEALTH CARE EDUCATION/TRAINING PROGRAM

## 2021-08-12 PROCEDURE — 96367 TX/PROPH/DG ADDL SEQ IV INF: CPT

## 2021-08-12 PROCEDURE — 63600175 PHARM REV CODE 636 W HCPCS: Performed by: INTERNAL MEDICINE

## 2021-08-12 PROCEDURE — 96416 CHEMO PROLONG INFUSE W/PUMP: CPT

## 2021-08-12 PROCEDURE — 96413 CHEMO IV INFUSION 1 HR: CPT

## 2021-08-12 PROCEDURE — 99214 OFFICE O/P EST MOD 30 MIN: CPT | Mod: S$PBB,GC,, | Performed by: STUDENT IN AN ORGANIZED HEALTH CARE EDUCATION/TRAINING PROGRAM

## 2021-08-12 PROCEDURE — 99214 PR OFFICE/OUTPT VISIT, EST, LEVL IV, 30-39 MIN: ICD-10-PCS | Mod: S$PBB,GC,, | Performed by: STUDENT IN AN ORGANIZED HEALTH CARE EDUCATION/TRAINING PROGRAM

## 2021-08-12 RX ORDER — DIPHENHYDRAMINE HYDROCHLORIDE 50 MG/ML
50 INJECTION INTRAMUSCULAR; INTRAVENOUS ONCE AS NEEDED
Status: CANCELLED | OUTPATIENT
Start: 2021-08-12

## 2021-08-12 RX ORDER — SODIUM CHLORIDE 0.9 % (FLUSH) 0.9 %
10 SYRINGE (ML) INJECTION
Status: DISCONTINUED | OUTPATIENT
Start: 2021-08-12 | End: 2021-08-12 | Stop reason: HOSPADM

## 2021-08-12 RX ORDER — EPINEPHRINE 0.3 MG/.3ML
0.3 INJECTION SUBCUTANEOUS ONCE AS NEEDED
Status: CANCELLED | OUTPATIENT
Start: 2021-08-12

## 2021-08-12 RX ORDER — EPINEPHRINE 0.3 MG/.3ML
0.3 INJECTION SUBCUTANEOUS ONCE AS NEEDED
Status: DISCONTINUED | OUTPATIENT
Start: 2021-08-12 | End: 2021-08-12 | Stop reason: HOSPADM

## 2021-08-12 RX ORDER — SODIUM CHLORIDE 0.9 % (FLUSH) 0.9 %
10 SYRINGE (ML) INJECTION
Status: CANCELLED | OUTPATIENT
Start: 2021-08-12

## 2021-08-12 RX ORDER — HEPARIN 100 UNIT/ML
500 SYRINGE INTRAVENOUS
Status: CANCELLED | OUTPATIENT
Start: 2021-08-14

## 2021-08-12 RX ORDER — DIPHENHYDRAMINE HYDROCHLORIDE 50 MG/ML
50 INJECTION INTRAMUSCULAR; INTRAVENOUS ONCE AS NEEDED
Status: DISCONTINUED | OUTPATIENT
Start: 2021-08-12 | End: 2021-08-12 | Stop reason: HOSPADM

## 2021-08-12 RX ORDER — HEPARIN 100 UNIT/ML
500 SYRINGE INTRAVENOUS
Status: DISCONTINUED | OUTPATIENT
Start: 2021-08-12 | End: 2021-08-12 | Stop reason: HOSPADM

## 2021-08-12 RX ORDER — SODIUM CHLORIDE 0.9 % (FLUSH) 0.9 %
10 SYRINGE (ML) INJECTION
Status: CANCELLED | OUTPATIENT
Start: 2021-08-14

## 2021-08-12 RX ORDER — HEPARIN 100 UNIT/ML
500 SYRINGE INTRAVENOUS
Status: CANCELLED | OUTPATIENT
Start: 2021-08-12

## 2021-08-12 RX ADMIN — PALONOSETRON HYDROCHLORIDE: 0.25 INJECTION, SOLUTION INTRAVENOUS at 11:08

## 2021-08-12 RX ADMIN — BEVACIZUMAB 285 MG: 100 INJECTION, SOLUTION INTRAVENOUS at 12:08

## 2021-08-12 RX ADMIN — SODIUM CHLORIDE: 0.9 INJECTION, SOLUTION INTRAVENOUS at 11:08

## 2021-08-12 RX ADMIN — FLUOROURACIL 3935 MG: 50 INJECTION, SOLUTION INTRAVENOUS at 12:08

## 2021-08-14 ENCOUNTER — INFUSION (OUTPATIENT)
Dept: INFUSION THERAPY | Facility: HOSPITAL | Age: 71
End: 2021-08-14
Payer: MEDICARE

## 2021-08-14 VITALS
WEIGHT: 131.81 LBS | RESPIRATION RATE: 16 BRPM | TEMPERATURE: 97 F | OXYGEN SATURATION: 99 % | SYSTOLIC BLOOD PRESSURE: 125 MMHG | BODY MASS INDEX: 20.69 KG/M2 | DIASTOLIC BLOOD PRESSURE: 75 MMHG | HEIGHT: 67 IN | HEART RATE: 74 BPM

## 2021-08-14 DIAGNOSIS — C78.7 METASTATIC COLON CANCER TO LIVER: Primary | ICD-10-CM

## 2021-08-14 DIAGNOSIS — C18.9 METASTATIC COLON CANCER TO LIVER: Primary | ICD-10-CM

## 2021-08-14 PROCEDURE — 63600175 PHARM REV CODE 636 W HCPCS: Performed by: INTERNAL MEDICINE

## 2021-08-14 PROCEDURE — 25000003 PHARM REV CODE 250: Performed by: INTERNAL MEDICINE

## 2021-08-14 PROCEDURE — A4216 STERILE WATER/SALINE, 10 ML: HCPCS | Performed by: INTERNAL MEDICINE

## 2021-08-14 RX ORDER — HEPARIN 100 UNIT/ML
500 SYRINGE INTRAVENOUS
Status: DISCONTINUED | OUTPATIENT
Start: 2021-08-14 | End: 2021-08-14 | Stop reason: HOSPADM

## 2021-08-14 RX ORDER — SODIUM CHLORIDE 0.9 % (FLUSH) 0.9 %
10 SYRINGE (ML) INJECTION
Status: DISCONTINUED | OUTPATIENT
Start: 2021-08-14 | End: 2021-08-14 | Stop reason: HOSPADM

## 2021-08-14 RX ADMIN — Medication 10 ML: at 10:08

## 2021-08-14 RX ADMIN — HEPARIN 500 UNITS: 100 SYRINGE at 10:08

## 2021-08-16 ENCOUNTER — IMMUNIZATION (OUTPATIENT)
Dept: INTERNAL MEDICINE | Facility: CLINIC | Age: 71
End: 2021-08-16
Payer: MEDICARE

## 2021-08-16 ENCOUNTER — TELEPHONE (OUTPATIENT)
Dept: HEMATOLOGY/ONCOLOGY | Facility: CLINIC | Age: 71
End: 2021-08-16

## 2021-08-16 DIAGNOSIS — Z23 NEED FOR VACCINATION: Primary | ICD-10-CM

## 2021-08-16 PROCEDURE — 0002A COVID-19, MRNA, LNP-S, PF, 30 MCG/0.3 ML DOSE VACCINE: CPT | Mod: CV19,S$GLB,, | Performed by: INTERNAL MEDICINE

## 2021-08-16 PROCEDURE — 91300 COVID-19, MRNA, LNP-S, PF, 30 MCG/0.3 ML DOSE VACCINE: ICD-10-PCS | Mod: S$GLB,,, | Performed by: INTERNAL MEDICINE

## 2021-08-16 PROCEDURE — 0002A COVID-19, MRNA, LNP-S, PF, 30 MCG/0.3 ML DOSE VACCINE: ICD-10-PCS | Mod: CV19,S$GLB,, | Performed by: INTERNAL MEDICINE

## 2021-08-16 PROCEDURE — 91300 COVID-19, MRNA, LNP-S, PF, 30 MCG/0.3 ML DOSE VACCINE: CPT | Mod: S$GLB,,, | Performed by: INTERNAL MEDICINE

## 2021-08-21 PROCEDURE — G0179 PR HOME HEALTH MD RECERTIFICATION: ICD-10-PCS | Mod: ,,, | Performed by: INTERNAL MEDICINE

## 2021-08-21 PROCEDURE — G0179 MD RECERTIFICATION HHA PT: HCPCS | Mod: ,,, | Performed by: INTERNAL MEDICINE

## 2021-08-26 ENCOUNTER — LAB VISIT (OUTPATIENT)
Dept: LAB | Facility: HOSPITAL | Age: 71
End: 2021-08-26
Payer: MEDICARE

## 2021-08-26 ENCOUNTER — INFUSION (OUTPATIENT)
Dept: INFUSION THERAPY | Facility: HOSPITAL | Age: 71
End: 2021-08-26
Payer: MEDICARE

## 2021-08-26 VITALS
WEIGHT: 134.5 LBS | BODY MASS INDEX: 21.11 KG/M2 | DIASTOLIC BLOOD PRESSURE: 79 MMHG | TEMPERATURE: 98 F | SYSTOLIC BLOOD PRESSURE: 128 MMHG | RESPIRATION RATE: 18 BRPM | HEART RATE: 74 BPM | HEIGHT: 67 IN

## 2021-08-26 DIAGNOSIS — C78.7 METASTATIC COLON CANCER TO LIVER: Primary | ICD-10-CM

## 2021-08-26 DIAGNOSIS — T45.1X5A ANEMIA ASSOCIATED WITH CHEMOTHERAPY: ICD-10-CM

## 2021-08-26 DIAGNOSIS — C18.9 METASTATIC COLON CANCER TO LIVER: ICD-10-CM

## 2021-08-26 DIAGNOSIS — D64.81 ANEMIA ASSOCIATED WITH CHEMOTHERAPY: ICD-10-CM

## 2021-08-26 DIAGNOSIS — C18.9 METASTATIC COLON CANCER TO LIVER: Primary | ICD-10-CM

## 2021-08-26 DIAGNOSIS — C78.7 METASTATIC COLON CANCER TO LIVER: ICD-10-CM

## 2021-08-26 LAB
ALBUMIN SERPL BCP-MCNC: 3.2 G/DL (ref 3.5–5.2)
ALP SERPL-CCNC: 99 U/L (ref 55–135)
ALT SERPL W/O P-5'-P-CCNC: 40 U/L (ref 10–44)
ANION GAP SERPL CALC-SCNC: 8 MMOL/L (ref 8–16)
AST SERPL-CCNC: 36 U/L (ref 10–40)
BASOPHILS # BLD AUTO: 0.04 K/UL (ref 0–0.2)
BASOPHILS NFR BLD: 0.5 % (ref 0–1.9)
BILIRUB SERPL-MCNC: 0.6 MG/DL (ref 0.1–1)
BUN SERPL-MCNC: 11 MG/DL (ref 8–23)
CALCIUM SERPL-MCNC: 9.1 MG/DL (ref 8.7–10.5)
CHLORIDE SERPL-SCNC: 104 MMOL/L (ref 95–110)
CO2 SERPL-SCNC: 26 MMOL/L (ref 23–29)
CREAT SERPL-MCNC: 1 MG/DL (ref 0.5–1.4)
DIFFERENTIAL METHOD: ABNORMAL
EOSINOPHIL # BLD AUTO: 0.2 K/UL (ref 0–0.5)
EOSINOPHIL NFR BLD: 2.7 % (ref 0–8)
ERYTHROCYTE [DISTWIDTH] IN BLOOD BY AUTOMATED COUNT: 19.6 % (ref 11.5–14.5)
EST. GFR  (AFRICAN AMERICAN): >60 ML/MIN/1.73 M^2
EST. GFR  (NON AFRICAN AMERICAN): >60 ML/MIN/1.73 M^2
FERRITIN SERPL-MCNC: 147 NG/ML (ref 20–300)
GLUCOSE SERPL-MCNC: 93 MG/DL (ref 70–110)
HCT VFR BLD AUTO: 38.5 % (ref 40–54)
HGB BLD-MCNC: 11.6 G/DL (ref 14–18)
IMM GRANULOCYTES # BLD AUTO: 0.01 K/UL (ref 0–0.04)
IMM GRANULOCYTES NFR BLD AUTO: 0.1 % (ref 0–0.5)
IRON SERPL-MCNC: 84 UG/DL (ref 45–160)
LYMPHOCYTES # BLD AUTO: 3.1 K/UL (ref 1–4.8)
LYMPHOCYTES NFR BLD: 40.9 % (ref 18–48)
MCH RBC QN AUTO: 26.6 PG (ref 27–31)
MCHC RBC AUTO-ENTMCNC: 30.1 G/DL (ref 32–36)
MCV RBC AUTO: 88 FL (ref 82–98)
MONOCYTES # BLD AUTO: 1.1 K/UL (ref 0.3–1)
MONOCYTES NFR BLD: 13.7 % (ref 4–15)
NEUTROPHILS # BLD AUTO: 3.2 K/UL (ref 1.8–7.7)
NEUTROPHILS NFR BLD: 42.1 % (ref 38–73)
NRBC BLD-RTO: 0 /100 WBC
PLATELET # BLD AUTO: 270 K/UL (ref 150–450)
PMV BLD AUTO: 9.8 FL (ref 9.2–12.9)
POTASSIUM SERPL-SCNC: 4.2 MMOL/L (ref 3.5–5.1)
PROT SERPL-MCNC: 7.4 G/DL (ref 6–8.4)
RBC # BLD AUTO: 4.36 M/UL (ref 4.6–6.2)
SATURATED IRON: 19 % (ref 20–50)
SODIUM SERPL-SCNC: 138 MMOL/L (ref 136–145)
TOTAL IRON BINDING CAPACITY: 438 UG/DL (ref 250–450)
TRANSFERRIN SERPL-MCNC: 296 MG/DL (ref 200–375)
WBC # BLD AUTO: 7.66 K/UL (ref 3.9–12.7)

## 2021-08-26 PROCEDURE — 82728 ASSAY OF FERRITIN: CPT | Performed by: STUDENT IN AN ORGANIZED HEALTH CARE EDUCATION/TRAINING PROGRAM

## 2021-08-26 PROCEDURE — 96416 CHEMO PROLONG INFUSE W/PUMP: CPT

## 2021-08-26 PROCEDURE — 96367 TX/PROPH/DG ADDL SEQ IV INF: CPT

## 2021-08-26 PROCEDURE — 36415 COLL VENOUS BLD VENIPUNCTURE: CPT | Performed by: STUDENT IN AN ORGANIZED HEALTH CARE EDUCATION/TRAINING PROGRAM

## 2021-08-26 PROCEDURE — 84466 ASSAY OF TRANSFERRIN: CPT | Performed by: STUDENT IN AN ORGANIZED HEALTH CARE EDUCATION/TRAINING PROGRAM

## 2021-08-26 PROCEDURE — 80053 COMPREHEN METABOLIC PANEL: CPT | Performed by: STUDENT IN AN ORGANIZED HEALTH CARE EDUCATION/TRAINING PROGRAM

## 2021-08-26 PROCEDURE — 25000003 PHARM REV CODE 250: Performed by: INTERNAL MEDICINE

## 2021-08-26 PROCEDURE — 63600175 PHARM REV CODE 636 W HCPCS: Performed by: INTERNAL MEDICINE

## 2021-08-26 PROCEDURE — 85025 COMPLETE CBC W/AUTO DIFF WBC: CPT | Performed by: STUDENT IN AN ORGANIZED HEALTH CARE EDUCATION/TRAINING PROGRAM

## 2021-08-26 PROCEDURE — 83540 ASSAY OF IRON: CPT | Performed by: STUDENT IN AN ORGANIZED HEALTH CARE EDUCATION/TRAINING PROGRAM

## 2021-08-26 PROCEDURE — 96417 CHEMO IV INFUS EACH ADDL SEQ: CPT

## 2021-08-26 RX ORDER — EPINEPHRINE 0.3 MG/.3ML
0.3 INJECTION SUBCUTANEOUS ONCE AS NEEDED
Status: CANCELLED | OUTPATIENT
Start: 2021-08-26

## 2021-08-26 RX ORDER — EPINEPHRINE 0.3 MG/.3ML
0.3 INJECTION SUBCUTANEOUS ONCE AS NEEDED
Status: DISCONTINUED | OUTPATIENT
Start: 2021-08-26 | End: 2021-08-26 | Stop reason: HOSPADM

## 2021-08-26 RX ORDER — DIPHENHYDRAMINE HYDROCHLORIDE 50 MG/ML
50 INJECTION INTRAMUSCULAR; INTRAVENOUS ONCE AS NEEDED
Status: CANCELLED | OUTPATIENT
Start: 2021-08-26

## 2021-08-26 RX ORDER — SODIUM CHLORIDE 0.9 % (FLUSH) 0.9 %
10 SYRINGE (ML) INJECTION
Status: DISCONTINUED | OUTPATIENT
Start: 2021-08-26 | End: 2021-08-26 | Stop reason: HOSPADM

## 2021-08-26 RX ORDER — HEPARIN 100 UNIT/ML
500 SYRINGE INTRAVENOUS
Status: CANCELLED | OUTPATIENT
Start: 2021-08-26

## 2021-08-26 RX ORDER — HEPARIN 100 UNIT/ML
500 SYRINGE INTRAVENOUS
Status: DISCONTINUED | OUTPATIENT
Start: 2021-08-26 | End: 2021-08-26 | Stop reason: HOSPADM

## 2021-08-26 RX ORDER — SODIUM CHLORIDE 0.9 % (FLUSH) 0.9 %
10 SYRINGE (ML) INJECTION
Status: CANCELLED | OUTPATIENT
Start: 2021-08-26

## 2021-08-26 RX ORDER — DIPHENHYDRAMINE HYDROCHLORIDE 50 MG/ML
50 INJECTION INTRAMUSCULAR; INTRAVENOUS ONCE AS NEEDED
Status: DISCONTINUED | OUTPATIENT
Start: 2021-08-26 | End: 2021-08-26 | Stop reason: HOSPADM

## 2021-08-26 RX ADMIN — FLUOROURACIL 3935 MG: 50 INJECTION, SOLUTION INTRAVENOUS at 09:08

## 2021-08-26 RX ADMIN — PALONOSETRON HYDROCHLORIDE 0.25 MG: 0.25 INJECTION, SOLUTION INTRAVENOUS at 08:08

## 2021-08-26 RX ADMIN — SODIUM CHLORIDE: 0.9 INJECTION, SOLUTION INTRAVENOUS at 08:08

## 2021-08-26 RX ADMIN — BEVACIZUMAB 285 MG: 100 INJECTION, SOLUTION INTRAVENOUS at 09:08

## 2021-08-28 ENCOUNTER — INFUSION (OUTPATIENT)
Dept: INFUSION THERAPY | Facility: HOSPITAL | Age: 71
End: 2021-08-28
Attending: STUDENT IN AN ORGANIZED HEALTH CARE EDUCATION/TRAINING PROGRAM
Payer: MEDICARE

## 2021-08-28 VITALS — HEART RATE: 75 BPM | DIASTOLIC BLOOD PRESSURE: 77 MMHG | TEMPERATURE: 98 F | SYSTOLIC BLOOD PRESSURE: 145 MMHG

## 2021-08-28 DIAGNOSIS — C78.7 METASTATIC COLON CANCER TO LIVER: Primary | ICD-10-CM

## 2021-08-28 DIAGNOSIS — C18.9 METASTATIC COLON CANCER TO LIVER: Primary | ICD-10-CM

## 2021-08-28 PROCEDURE — A4216 STERILE WATER/SALINE, 10 ML: HCPCS | Performed by: STUDENT IN AN ORGANIZED HEALTH CARE EDUCATION/TRAINING PROGRAM

## 2021-08-28 PROCEDURE — 25000003 PHARM REV CODE 250: Performed by: STUDENT IN AN ORGANIZED HEALTH CARE EDUCATION/TRAINING PROGRAM

## 2021-08-28 PROCEDURE — 63600175 PHARM REV CODE 636 W HCPCS: Performed by: STUDENT IN AN ORGANIZED HEALTH CARE EDUCATION/TRAINING PROGRAM

## 2021-08-28 RX ORDER — HEPARIN 100 UNIT/ML
500 SYRINGE INTRAVENOUS
Status: DISCONTINUED | OUTPATIENT
Start: 2021-08-28 | End: 2021-08-28 | Stop reason: HOSPADM

## 2021-08-28 RX ORDER — SODIUM CHLORIDE 0.9 % (FLUSH) 0.9 %
10 SYRINGE (ML) INJECTION
Status: DISCONTINUED | OUTPATIENT
Start: 2021-08-28 | End: 2021-08-28 | Stop reason: HOSPADM

## 2021-08-28 RX ADMIN — Medication 10 ML: at 08:08

## 2021-08-28 RX ADMIN — HEPARIN 500 UNITS: 100 SYRINGE at 08:08

## 2021-09-03 ENCOUNTER — TELEPHONE (OUTPATIENT)
Dept: HEMATOLOGY/ONCOLOGY | Facility: CLINIC | Age: 71
End: 2021-09-03

## 2021-09-09 ENCOUNTER — OFFICE VISIT (OUTPATIENT)
Dept: HEMATOLOGY/ONCOLOGY | Facility: CLINIC | Age: 71
End: 2021-09-09
Payer: MEDICARE

## 2021-09-09 ENCOUNTER — INFUSION (OUTPATIENT)
Dept: INFUSION THERAPY | Facility: HOSPITAL | Age: 71
End: 2021-09-09
Payer: MEDICARE

## 2021-09-09 VITALS
TEMPERATURE: 98 F | HEART RATE: 83 BPM | DIASTOLIC BLOOD PRESSURE: 84 MMHG | WEIGHT: 135.81 LBS | SYSTOLIC BLOOD PRESSURE: 130 MMHG | HEIGHT: 67 IN | BODY MASS INDEX: 21.31 KG/M2

## 2021-09-09 VITALS
RESPIRATION RATE: 18 BRPM | SYSTOLIC BLOOD PRESSURE: 113 MMHG | TEMPERATURE: 98 F | DIASTOLIC BLOOD PRESSURE: 67 MMHG | HEART RATE: 68 BPM

## 2021-09-09 DIAGNOSIS — C18.9 COLON CANCER METASTASIZED TO LIVER: Primary | ICD-10-CM

## 2021-09-09 DIAGNOSIS — C78.7 METASTATIC COLON CANCER TO LIVER: ICD-10-CM

## 2021-09-09 DIAGNOSIS — C78.7 METASTATIC COLON CANCER TO LIVER: Primary | ICD-10-CM

## 2021-09-09 DIAGNOSIS — C18.9 METASTATIC COLON CANCER TO LIVER: Primary | ICD-10-CM

## 2021-09-09 DIAGNOSIS — T45.1X5A ANEMIA ASSOCIATED WITH CHEMOTHERAPY: ICD-10-CM

## 2021-09-09 DIAGNOSIS — C78.7 COLON CANCER METASTASIZED TO LIVER: Primary | ICD-10-CM

## 2021-09-09 DIAGNOSIS — I10 HYPERTENSION, UNSPECIFIED TYPE: ICD-10-CM

## 2021-09-09 DIAGNOSIS — C18.9 METASTATIC COLON CANCER TO LIVER: ICD-10-CM

## 2021-09-09 DIAGNOSIS — D64.81 ANEMIA ASSOCIATED WITH CHEMOTHERAPY: ICD-10-CM

## 2021-09-09 PROCEDURE — 96416 CHEMO PROLONG INFUSE W/PUMP: CPT

## 2021-09-09 PROCEDURE — 96413 CHEMO IV INFUSION 1 HR: CPT

## 2021-09-09 PROCEDURE — 99213 OFFICE O/P EST LOW 20 MIN: CPT | Mod: PBBFAC,25 | Performed by: STUDENT IN AN ORGANIZED HEALTH CARE EDUCATION/TRAINING PROGRAM

## 2021-09-09 PROCEDURE — 96367 TX/PROPH/DG ADDL SEQ IV INF: CPT

## 2021-09-09 PROCEDURE — 99214 OFFICE O/P EST MOD 30 MIN: CPT | Mod: S$PBB,GC,, | Performed by: STUDENT IN AN ORGANIZED HEALTH CARE EDUCATION/TRAINING PROGRAM

## 2021-09-09 PROCEDURE — 99214 PR OFFICE/OUTPT VISIT, EST, LEVL IV, 30-39 MIN: ICD-10-PCS | Mod: S$PBB,GC,, | Performed by: STUDENT IN AN ORGANIZED HEALTH CARE EDUCATION/TRAINING PROGRAM

## 2021-09-09 PROCEDURE — 99999 PR PBB SHADOW E&M-EST. PATIENT-LVL III: ICD-10-PCS | Mod: PBBFAC,GC,, | Performed by: STUDENT IN AN ORGANIZED HEALTH CARE EDUCATION/TRAINING PROGRAM

## 2021-09-09 PROCEDURE — 63600175 PHARM REV CODE 636 W HCPCS: Performed by: INTERNAL MEDICINE

## 2021-09-09 PROCEDURE — 99999 PR PBB SHADOW E&M-EST. PATIENT-LVL III: CPT | Mod: PBBFAC,GC,, | Performed by: STUDENT IN AN ORGANIZED HEALTH CARE EDUCATION/TRAINING PROGRAM

## 2021-09-09 PROCEDURE — 25000003 PHARM REV CODE 250: Performed by: INTERNAL MEDICINE

## 2021-09-09 RX ORDER — SODIUM CHLORIDE 0.9 % (FLUSH) 0.9 %
10 SYRINGE (ML) INJECTION
Status: CANCELLED | OUTPATIENT
Start: 2021-09-11

## 2021-09-09 RX ORDER — SODIUM CHLORIDE 0.9 % (FLUSH) 0.9 %
10 SYRINGE (ML) INJECTION
Status: CANCELLED | OUTPATIENT
Start: 2021-09-09

## 2021-09-09 RX ORDER — HEPARIN 100 UNIT/ML
500 SYRINGE INTRAVENOUS
Status: DISCONTINUED | OUTPATIENT
Start: 2021-09-09 | End: 2021-09-09 | Stop reason: HOSPADM

## 2021-09-09 RX ORDER — DIPHENHYDRAMINE HYDROCHLORIDE 50 MG/ML
50 INJECTION INTRAMUSCULAR; INTRAVENOUS ONCE AS NEEDED
Status: DISCONTINUED | OUTPATIENT
Start: 2021-09-09 | End: 2021-09-09 | Stop reason: HOSPADM

## 2021-09-09 RX ORDER — EPINEPHRINE 0.3 MG/.3ML
0.3 INJECTION SUBCUTANEOUS ONCE AS NEEDED
Status: DISCONTINUED | OUTPATIENT
Start: 2021-09-09 | End: 2021-09-09 | Stop reason: HOSPADM

## 2021-09-09 RX ORDER — DIPHENHYDRAMINE HYDROCHLORIDE 50 MG/ML
50 INJECTION INTRAMUSCULAR; INTRAVENOUS ONCE AS NEEDED
Status: CANCELLED | OUTPATIENT
Start: 2021-09-09

## 2021-09-09 RX ORDER — HEPARIN 100 UNIT/ML
500 SYRINGE INTRAVENOUS
Status: CANCELLED | OUTPATIENT
Start: 2021-09-09

## 2021-09-09 RX ORDER — HEPARIN 100 UNIT/ML
500 SYRINGE INTRAVENOUS
Status: CANCELLED | OUTPATIENT
Start: 2021-09-11

## 2021-09-09 RX ORDER — EPINEPHRINE 0.3 MG/.3ML
0.3 INJECTION SUBCUTANEOUS ONCE AS NEEDED
Status: CANCELLED | OUTPATIENT
Start: 2021-09-09

## 2021-09-09 RX ORDER — SODIUM CHLORIDE 0.9 % (FLUSH) 0.9 %
10 SYRINGE (ML) INJECTION
Status: DISCONTINUED | OUTPATIENT
Start: 2021-09-09 | End: 2021-09-09 | Stop reason: HOSPADM

## 2021-09-09 RX ADMIN — SODIUM CHLORIDE: 9 INJECTION, SOLUTION INTRAVENOUS at 09:09

## 2021-09-09 RX ADMIN — BEVACIZUMAB 285 MG: 100 INJECTION, SOLUTION INTRAVENOUS at 09:09

## 2021-09-09 RX ADMIN — FLUOROURACIL 3935 MG: 50 INJECTION, SOLUTION INTRAVENOUS at 10:09

## 2021-09-09 RX ADMIN — DEXAMETHASONE SODIUM PHOSPHATE 0.25 MG: 4 INJECTION, SOLUTION INTRA-ARTICULAR; INTRALESIONAL; INTRAMUSCULAR; INTRAVENOUS; SOFT TISSUE at 09:09

## 2021-09-10 ENCOUNTER — EXTERNAL HOME HEALTH (OUTPATIENT)
Dept: HOME HEALTH SERVICES | Facility: HOSPITAL | Age: 71
End: 2021-09-10
Payer: MEDICARE

## 2021-09-11 ENCOUNTER — INFUSION (OUTPATIENT)
Dept: INFUSION THERAPY | Facility: HOSPITAL | Age: 71
End: 2021-09-11
Payer: MEDICARE

## 2021-09-11 DIAGNOSIS — C18.9 METASTATIC COLON CANCER TO LIVER: Primary | ICD-10-CM

## 2021-09-11 DIAGNOSIS — C78.7 METASTATIC COLON CANCER TO LIVER: Primary | ICD-10-CM

## 2021-09-11 PROCEDURE — 63600175 PHARM REV CODE 636 W HCPCS: Performed by: INTERNAL MEDICINE

## 2021-09-11 PROCEDURE — A4216 STERILE WATER/SALINE, 10 ML: HCPCS | Performed by: INTERNAL MEDICINE

## 2021-09-11 PROCEDURE — 25000003 PHARM REV CODE 250: Performed by: INTERNAL MEDICINE

## 2021-09-11 RX ORDER — HEPARIN 100 UNIT/ML
500 SYRINGE INTRAVENOUS
Status: DISCONTINUED | OUTPATIENT
Start: 2021-09-11 | End: 2021-09-11 | Stop reason: HOSPADM

## 2021-09-11 RX ORDER — SODIUM CHLORIDE 0.9 % (FLUSH) 0.9 %
10 SYRINGE (ML) INJECTION
Status: DISCONTINUED | OUTPATIENT
Start: 2021-09-11 | End: 2021-09-11 | Stop reason: HOSPADM

## 2021-09-11 RX ADMIN — Medication 10 ML: at 08:09

## 2021-09-11 RX ADMIN — HEPARIN 500 UNITS: 100 SYRINGE at 08:09

## 2021-09-20 DIAGNOSIS — I10 HYPERTENSION, UNSPECIFIED TYPE: ICD-10-CM

## 2021-09-20 RX ORDER — AMLODIPINE BESYLATE 10 MG/1
10 TABLET ORAL DAILY
Qty: 30 TABLET | Refills: 3 | Status: SHIPPED | OUTPATIENT
Start: 2021-09-20 | End: 2022-02-14 | Stop reason: SDUPTHER

## 2021-09-23 ENCOUNTER — OFFICE VISIT (OUTPATIENT)
Dept: HEMATOLOGY/ONCOLOGY | Facility: CLINIC | Age: 71
End: 2021-09-23
Payer: MEDICARE

## 2021-09-23 ENCOUNTER — INFUSION (OUTPATIENT)
Dept: INFUSION THERAPY | Facility: HOSPITAL | Age: 71
End: 2021-09-23
Attending: STUDENT IN AN ORGANIZED HEALTH CARE EDUCATION/TRAINING PROGRAM
Payer: MEDICARE

## 2021-09-23 VITALS
WEIGHT: 136.88 LBS | DIASTOLIC BLOOD PRESSURE: 77 MMHG | HEART RATE: 72 BPM | BODY MASS INDEX: 21.48 KG/M2 | RESPIRATION RATE: 18 BRPM | WEIGHT: 136.88 LBS | HEIGHT: 67 IN | HEIGHT: 67 IN | SYSTOLIC BLOOD PRESSURE: 134 MMHG | TEMPERATURE: 98 F | DIASTOLIC BLOOD PRESSURE: 76 MMHG | HEART RATE: 64 BPM | SYSTOLIC BLOOD PRESSURE: 135 MMHG | BODY MASS INDEX: 21.48 KG/M2

## 2021-09-23 DIAGNOSIS — C18.9 METASTATIC COLON CANCER TO LIVER: ICD-10-CM

## 2021-09-23 DIAGNOSIS — T45.1X5A ANEMIA ASSOCIATED WITH CHEMOTHERAPY: ICD-10-CM

## 2021-09-23 DIAGNOSIS — I10 HYPERTENSION, UNSPECIFIED TYPE: ICD-10-CM

## 2021-09-23 DIAGNOSIS — C26.9 MALIGNANT NEOPLASM OF ILL-DEFINED SITES WITHIN THE DIGESTIVE SYSTEM: ICD-10-CM

## 2021-09-23 DIAGNOSIS — D64.81 ANEMIA ASSOCIATED WITH CHEMOTHERAPY: ICD-10-CM

## 2021-09-23 DIAGNOSIS — C18.9 COLON CANCER METASTASIZED TO LIVER: Primary | ICD-10-CM

## 2021-09-23 DIAGNOSIS — C78.7 COLON CANCER METASTASIZED TO LIVER: Primary | ICD-10-CM

## 2021-09-23 DIAGNOSIS — C78.7 METASTATIC COLON CANCER TO LIVER: ICD-10-CM

## 2021-09-23 DIAGNOSIS — C78.7 METASTATIC COLON CANCER TO LIVER: Primary | ICD-10-CM

## 2021-09-23 DIAGNOSIS — C18.9 METASTATIC COLON CANCER TO LIVER: Primary | ICD-10-CM

## 2021-09-23 PROCEDURE — 25000003 PHARM REV CODE 250: Performed by: INTERNAL MEDICINE

## 2021-09-23 PROCEDURE — 96416 CHEMO PROLONG INFUSE W/PUMP: CPT

## 2021-09-23 PROCEDURE — 99999 PR PBB SHADOW E&M-EST. PATIENT-LVL III: ICD-10-PCS | Mod: PBBFAC,GC,, | Performed by: STUDENT IN AN ORGANIZED HEALTH CARE EDUCATION/TRAINING PROGRAM

## 2021-09-23 PROCEDURE — 99999 PR PBB SHADOW E&M-EST. PATIENT-LVL III: CPT | Mod: PBBFAC,GC,, | Performed by: STUDENT IN AN ORGANIZED HEALTH CARE EDUCATION/TRAINING PROGRAM

## 2021-09-23 PROCEDURE — 63600175 PHARM REV CODE 636 W HCPCS: Performed by: INTERNAL MEDICINE

## 2021-09-23 PROCEDURE — 99214 PR OFFICE/OUTPT VISIT, EST, LEVL IV, 30-39 MIN: ICD-10-PCS | Mod: S$PBB,GC,, | Performed by: STUDENT IN AN ORGANIZED HEALTH CARE EDUCATION/TRAINING PROGRAM

## 2021-09-23 PROCEDURE — 96376 TX/PRO/DX INJ SAME DRUG ADON: CPT

## 2021-09-23 PROCEDURE — 96413 CHEMO IV INFUSION 1 HR: CPT

## 2021-09-23 PROCEDURE — 99213 OFFICE O/P EST LOW 20 MIN: CPT | Mod: PBBFAC,25 | Performed by: STUDENT IN AN ORGANIZED HEALTH CARE EDUCATION/TRAINING PROGRAM

## 2021-09-23 PROCEDURE — 99214 OFFICE O/P EST MOD 30 MIN: CPT | Mod: S$PBB,GC,, | Performed by: STUDENT IN AN ORGANIZED HEALTH CARE EDUCATION/TRAINING PROGRAM

## 2021-09-23 RX ORDER — DIPHENHYDRAMINE HYDROCHLORIDE 50 MG/ML
50 INJECTION INTRAMUSCULAR; INTRAVENOUS ONCE AS NEEDED
Status: DISCONTINUED | OUTPATIENT
Start: 2021-09-23 | End: 2021-09-23 | Stop reason: HOSPADM

## 2021-09-23 RX ORDER — EPINEPHRINE 0.3 MG/.3ML
0.3 INJECTION SUBCUTANEOUS ONCE AS NEEDED
Status: CANCELLED | OUTPATIENT
Start: 2021-09-23

## 2021-09-23 RX ORDER — ONDANSETRON 2 MG/ML
8 INJECTION INTRAMUSCULAR; INTRAVENOUS
Status: COMPLETED | OUTPATIENT
Start: 2021-09-23 | End: 2021-09-23

## 2021-09-23 RX ORDER — HEPARIN 100 UNIT/ML
500 SYRINGE INTRAVENOUS
Status: CANCELLED | OUTPATIENT
Start: 2021-09-25

## 2021-09-23 RX ORDER — DIPHENHYDRAMINE HYDROCHLORIDE 50 MG/ML
50 INJECTION INTRAMUSCULAR; INTRAVENOUS ONCE AS NEEDED
Status: CANCELLED | OUTPATIENT
Start: 2021-09-23

## 2021-09-23 RX ORDER — SODIUM CHLORIDE 0.9 % (FLUSH) 0.9 %
10 SYRINGE (ML) INJECTION
Status: CANCELLED | OUTPATIENT
Start: 2021-09-25

## 2021-09-23 RX ORDER — SODIUM CHLORIDE 0.9 % (FLUSH) 0.9 %
10 SYRINGE (ML) INJECTION
Status: DISCONTINUED | OUTPATIENT
Start: 2021-09-23 | End: 2021-09-23 | Stop reason: HOSPADM

## 2021-09-23 RX ORDER — ONDANSETRON 2 MG/ML
8 INJECTION INTRAMUSCULAR; INTRAVENOUS
Status: CANCELLED
Start: 2021-09-23

## 2021-09-23 RX ORDER — EPINEPHRINE 0.3 MG/.3ML
0.3 INJECTION SUBCUTANEOUS ONCE AS NEEDED
Status: DISCONTINUED | OUTPATIENT
Start: 2021-09-23 | End: 2021-09-23 | Stop reason: HOSPADM

## 2021-09-23 RX ORDER — HEPARIN 100 UNIT/ML
500 SYRINGE INTRAVENOUS
Status: CANCELLED | OUTPATIENT
Start: 2021-09-23

## 2021-09-23 RX ORDER — SODIUM CHLORIDE 0.9 % (FLUSH) 0.9 %
10 SYRINGE (ML) INJECTION
Status: CANCELLED | OUTPATIENT
Start: 2021-09-23

## 2021-09-23 RX ORDER — HEPARIN 100 UNIT/ML
500 SYRINGE INTRAVENOUS
Status: DISCONTINUED | OUTPATIENT
Start: 2021-09-23 | End: 2021-09-23 | Stop reason: HOSPADM

## 2021-09-23 RX ADMIN — BEVACIZUMAB 285 MG: 100 INJECTION, SOLUTION INTRAVENOUS at 09:09

## 2021-09-23 RX ADMIN — SODIUM CHLORIDE: 0.9 INJECTION, SOLUTION INTRAVENOUS at 09:09

## 2021-09-23 RX ADMIN — ONDANSETRON 8 MG: 2 INJECTION, SOLUTION INTRAMUSCULAR; INTRAVENOUS at 09:09

## 2021-09-23 RX ADMIN — FLUOROURACIL 3935 MG: 50 INJECTION, SOLUTION INTRAVENOUS at 10:09

## 2021-09-25 ENCOUNTER — INFUSION (OUTPATIENT)
Dept: INFUSION THERAPY | Facility: HOSPITAL | Age: 71
End: 2021-09-25
Attending: STUDENT IN AN ORGANIZED HEALTH CARE EDUCATION/TRAINING PROGRAM
Payer: MEDICARE

## 2021-09-25 VITALS
DIASTOLIC BLOOD PRESSURE: 77 MMHG | HEIGHT: 67 IN | WEIGHT: 136.88 LBS | SYSTOLIC BLOOD PRESSURE: 132 MMHG | TEMPERATURE: 98 F | RESPIRATION RATE: 18 BRPM | HEART RATE: 72 BPM | BODY MASS INDEX: 21.48 KG/M2

## 2021-09-25 DIAGNOSIS — C78.7 METASTATIC COLON CANCER TO LIVER: Primary | ICD-10-CM

## 2021-09-25 DIAGNOSIS — C18.9 METASTATIC COLON CANCER TO LIVER: Primary | ICD-10-CM

## 2021-09-25 PROCEDURE — 25000003 PHARM REV CODE 250: Performed by: INTERNAL MEDICINE

## 2021-09-25 PROCEDURE — A4216 STERILE WATER/SALINE, 10 ML: HCPCS | Performed by: INTERNAL MEDICINE

## 2021-09-25 PROCEDURE — 63600175 PHARM REV CODE 636 W HCPCS: Performed by: INTERNAL MEDICINE

## 2021-09-25 RX ORDER — SODIUM CHLORIDE 0.9 % (FLUSH) 0.9 %
10 SYRINGE (ML) INJECTION
Status: DISCONTINUED | OUTPATIENT
Start: 2021-09-25 | End: 2021-09-25 | Stop reason: HOSPADM

## 2021-09-25 RX ORDER — HEPARIN 100 UNIT/ML
500 SYRINGE INTRAVENOUS
Status: DISCONTINUED | OUTPATIENT
Start: 2021-09-25 | End: 2021-09-25 | Stop reason: HOSPADM

## 2021-09-25 RX ADMIN — Medication 10 ML: at 08:09

## 2021-09-25 RX ADMIN — HEPARIN 500 UNITS: 100 SYRINGE at 08:09

## 2021-10-05 ENCOUNTER — HOSPITAL ENCOUNTER (OUTPATIENT)
Dept: RADIOLOGY | Facility: HOSPITAL | Age: 71
Discharge: HOME | End: 2021-10-05
Attending: STUDENT IN AN ORGANIZED HEALTH CARE EDUCATION/TRAINING PROGRAM
Payer: MEDICARE

## 2021-10-05 DIAGNOSIS — C78.7 COLON CANCER METASTASIZED TO LIVER: ICD-10-CM

## 2021-10-05 DIAGNOSIS — C26.9 MALIGNANT NEOPLASM OF ILL-DEFINED SITES WITHIN THE DIGESTIVE SYSTEM: ICD-10-CM

## 2021-10-05 DIAGNOSIS — C18.9 COLON CANCER METASTASIZED TO LIVER: ICD-10-CM

## 2021-10-05 DIAGNOSIS — C78.7 METASTATIC COLON CANCER TO LIVER: ICD-10-CM

## 2021-10-05 DIAGNOSIS — C18.9 METASTATIC COLON CANCER TO LIVER: ICD-10-CM

## 2021-10-05 PROCEDURE — 25500020 PHARM REV CODE 255: Performed by: STUDENT IN AN ORGANIZED HEALTH CARE EDUCATION/TRAINING PROGRAM

## 2021-10-05 PROCEDURE — 71260 CT CHEST ABDOMEN PELVIS WITH CONTRAST (XPD): ICD-10-PCS | Mod: 26,GC,, | Performed by: RADIOLOGY

## 2021-10-05 PROCEDURE — 74177 CT CHEST ABDOMEN PELVIS WITH CONTRAST (XPD): ICD-10-PCS | Mod: 26,GC,, | Performed by: RADIOLOGY

## 2021-10-05 PROCEDURE — 71260 CT THORAX DX C+: CPT | Mod: 26,GC,, | Performed by: RADIOLOGY

## 2021-10-05 PROCEDURE — 74177 CT ABD & PELVIS W/CONTRAST: CPT | Mod: 26,GC,, | Performed by: RADIOLOGY

## 2021-10-05 PROCEDURE — G1004 CDSM NDSC: HCPCS

## 2021-10-05 RX ADMIN — IOHEXOL 75 ML: 350 INJECTION, SOLUTION INTRAVENOUS at 03:10

## 2021-10-07 ENCOUNTER — INFUSION (OUTPATIENT)
Dept: INFUSION THERAPY | Facility: HOSPITAL | Age: 71
End: 2021-10-07
Attending: STUDENT IN AN ORGANIZED HEALTH CARE EDUCATION/TRAINING PROGRAM
Payer: MEDICARE

## 2021-10-07 ENCOUNTER — OFFICE VISIT (OUTPATIENT)
Dept: HEMATOLOGY/ONCOLOGY | Facility: CLINIC | Age: 71
End: 2021-10-07
Payer: MEDICARE

## 2021-10-07 VITALS
TEMPERATURE: 98 F | SYSTOLIC BLOOD PRESSURE: 114 MMHG | DIASTOLIC BLOOD PRESSURE: 75 MMHG | HEIGHT: 67 IN | WEIGHT: 134.69 LBS | BODY MASS INDEX: 21.14 KG/M2 | HEART RATE: 69 BPM

## 2021-10-07 VITALS
HEART RATE: 63 BPM | RESPIRATION RATE: 18 BRPM | DIASTOLIC BLOOD PRESSURE: 77 MMHG | SYSTOLIC BLOOD PRESSURE: 143 MMHG | TEMPERATURE: 98 F

## 2021-10-07 DIAGNOSIS — C78.7 METASTATIC COLON CANCER TO LIVER: Primary | ICD-10-CM

## 2021-10-07 DIAGNOSIS — I10 HYPERTENSION, UNSPECIFIED TYPE: ICD-10-CM

## 2021-10-07 DIAGNOSIS — C78.7 COLON CANCER METASTASIZED TO LIVER: Primary | ICD-10-CM

## 2021-10-07 DIAGNOSIS — C18.9 METASTATIC COLON CANCER TO LIVER: Primary | ICD-10-CM

## 2021-10-07 DIAGNOSIS — C18.9 COLON CANCER METASTASIZED TO LIVER: Primary | ICD-10-CM

## 2021-10-07 PROCEDURE — 96375 TX/PRO/DX INJ NEW DRUG ADDON: CPT

## 2021-10-07 PROCEDURE — 96413 CHEMO IV INFUSION 1 HR: CPT

## 2021-10-07 PROCEDURE — 99215 PR OFFICE/OUTPT VISIT, EST, LEVL V, 40-54 MIN: ICD-10-PCS | Mod: S$PBB,GC,, | Performed by: STUDENT IN AN ORGANIZED HEALTH CARE EDUCATION/TRAINING PROGRAM

## 2021-10-07 PROCEDURE — 25000003 PHARM REV CODE 250: Performed by: INTERNAL MEDICINE

## 2021-10-07 PROCEDURE — 99215 OFFICE O/P EST HI 40 MIN: CPT | Mod: S$PBB,GC,, | Performed by: STUDENT IN AN ORGANIZED HEALTH CARE EDUCATION/TRAINING PROGRAM

## 2021-10-07 PROCEDURE — 99213 OFFICE O/P EST LOW 20 MIN: CPT | Mod: PBBFAC,25 | Performed by: STUDENT IN AN ORGANIZED HEALTH CARE EDUCATION/TRAINING PROGRAM

## 2021-10-07 PROCEDURE — 63600175 PHARM REV CODE 636 W HCPCS: Performed by: INTERNAL MEDICINE

## 2021-10-07 PROCEDURE — 96416 CHEMO PROLONG INFUSE W/PUMP: CPT

## 2021-10-07 PROCEDURE — 99999 PR PBB SHADOW E&M-EST. PATIENT-LVL III: ICD-10-PCS | Mod: PBBFAC,GC,, | Performed by: STUDENT IN AN ORGANIZED HEALTH CARE EDUCATION/TRAINING PROGRAM

## 2021-10-07 PROCEDURE — 99999 PR PBB SHADOW E&M-EST. PATIENT-LVL III: CPT | Mod: PBBFAC,GC,, | Performed by: STUDENT IN AN ORGANIZED HEALTH CARE EDUCATION/TRAINING PROGRAM

## 2021-10-07 RX ORDER — SODIUM CHLORIDE 0.9 % (FLUSH) 0.9 %
10 SYRINGE (ML) INJECTION
Status: CANCELLED | OUTPATIENT
Start: 2021-10-07

## 2021-10-07 RX ORDER — HEPARIN 100 UNIT/ML
500 SYRINGE INTRAVENOUS
Status: CANCELLED | OUTPATIENT
Start: 2021-10-09

## 2021-10-07 RX ORDER — DIPHENHYDRAMINE HYDROCHLORIDE 50 MG/ML
50 INJECTION INTRAMUSCULAR; INTRAVENOUS ONCE AS NEEDED
Status: CANCELLED | OUTPATIENT
Start: 2021-10-07

## 2021-10-07 RX ORDER — DIPHENHYDRAMINE HYDROCHLORIDE 50 MG/ML
50 INJECTION INTRAMUSCULAR; INTRAVENOUS ONCE AS NEEDED
Status: DISCONTINUED | OUTPATIENT
Start: 2021-10-07 | End: 2021-10-07 | Stop reason: HOSPADM

## 2021-10-07 RX ORDER — ONDANSETRON 2 MG/ML
8 INJECTION INTRAMUSCULAR; INTRAVENOUS
Status: CANCELLED
Start: 2021-10-07

## 2021-10-07 RX ORDER — EPINEPHRINE 0.3 MG/.3ML
0.3 INJECTION SUBCUTANEOUS ONCE AS NEEDED
Status: CANCELLED | OUTPATIENT
Start: 2021-10-07

## 2021-10-07 RX ORDER — HEPARIN 100 UNIT/ML
500 SYRINGE INTRAVENOUS
Status: CANCELLED | OUTPATIENT
Start: 2021-10-07

## 2021-10-07 RX ORDER — SODIUM CHLORIDE 0.9 % (FLUSH) 0.9 %
10 SYRINGE (ML) INJECTION
Status: DISCONTINUED | OUTPATIENT
Start: 2021-10-07 | End: 2021-10-07 | Stop reason: HOSPADM

## 2021-10-07 RX ORDER — ONDANSETRON 2 MG/ML
8 INJECTION INTRAMUSCULAR; INTRAVENOUS
Status: COMPLETED | OUTPATIENT
Start: 2021-10-07 | End: 2021-10-07

## 2021-10-07 RX ORDER — HEPARIN 100 UNIT/ML
500 SYRINGE INTRAVENOUS
Status: DISCONTINUED | OUTPATIENT
Start: 2021-10-07 | End: 2021-10-07 | Stop reason: HOSPADM

## 2021-10-07 RX ORDER — SODIUM CHLORIDE 0.9 % (FLUSH) 0.9 %
10 SYRINGE (ML) INJECTION
Status: CANCELLED | OUTPATIENT
Start: 2021-10-09

## 2021-10-07 RX ORDER — EPINEPHRINE 0.3 MG/.3ML
0.3 INJECTION SUBCUTANEOUS ONCE AS NEEDED
Status: DISCONTINUED | OUTPATIENT
Start: 2021-10-07 | End: 2021-10-07 | Stop reason: HOSPADM

## 2021-10-07 RX ADMIN — ONDANSETRON 8 MG: 2 INJECTION, SOLUTION INTRAMUSCULAR; INTRAVENOUS at 11:10

## 2021-10-07 RX ADMIN — SODIUM CHLORIDE: 9 INJECTION, SOLUTION INTRAVENOUS at 09:10

## 2021-10-07 RX ADMIN — FLUOROURACIL 3935 MG: 50 INJECTION, SOLUTION INTRAVENOUS at 11:10

## 2021-10-07 RX ADMIN — BEVACIZUMAB 285 MG: 100 INJECTION, SOLUTION INTRAVENOUS at 10:10

## 2021-10-09 ENCOUNTER — INFUSION (OUTPATIENT)
Dept: INFUSION THERAPY | Facility: HOSPITAL | Age: 71
End: 2021-10-09
Attending: STUDENT IN AN ORGANIZED HEALTH CARE EDUCATION/TRAINING PROGRAM
Payer: MEDICARE

## 2021-10-09 VITALS
DIASTOLIC BLOOD PRESSURE: 75 MMHG | BODY MASS INDEX: 21.14 KG/M2 | SYSTOLIC BLOOD PRESSURE: 135 MMHG | RESPIRATION RATE: 18 BRPM | HEIGHT: 67 IN | HEART RATE: 79 BPM | WEIGHT: 134.69 LBS | TEMPERATURE: 99 F

## 2021-10-09 DIAGNOSIS — C78.7 METASTATIC COLON CANCER TO LIVER: Primary | ICD-10-CM

## 2021-10-09 DIAGNOSIS — C18.9 METASTATIC COLON CANCER TO LIVER: Primary | ICD-10-CM

## 2021-10-09 PROCEDURE — 63600175 PHARM REV CODE 636 W HCPCS: Performed by: INTERNAL MEDICINE

## 2021-10-09 PROCEDURE — 25000003 PHARM REV CODE 250: Performed by: INTERNAL MEDICINE

## 2021-10-09 PROCEDURE — A4216 STERILE WATER/SALINE, 10 ML: HCPCS | Performed by: INTERNAL MEDICINE

## 2021-10-09 RX ORDER — HEPARIN 100 UNIT/ML
500 SYRINGE INTRAVENOUS
Status: DISCONTINUED | OUTPATIENT
Start: 2021-10-09 | End: 2021-10-09 | Stop reason: HOSPADM

## 2021-10-09 RX ORDER — SODIUM CHLORIDE 0.9 % (FLUSH) 0.9 %
10 SYRINGE (ML) INJECTION
Status: DISCONTINUED | OUTPATIENT
Start: 2021-10-09 | End: 2021-10-09 | Stop reason: HOSPADM

## 2021-10-09 RX ADMIN — HEPARIN 500 UNITS: 100 SYRINGE at 09:10

## 2021-10-09 RX ADMIN — Medication 10 ML: at 09:10

## 2021-10-21 ENCOUNTER — INFUSION (OUTPATIENT)
Dept: INFUSION THERAPY | Facility: HOSPITAL | Age: 71
End: 2021-10-21
Payer: MEDICARE

## 2021-10-21 VITALS
BODY MASS INDEX: 21.14 KG/M2 | HEIGHT: 67 IN | TEMPERATURE: 98 F | HEART RATE: 62 BPM | RESPIRATION RATE: 16 BRPM | DIASTOLIC BLOOD PRESSURE: 70 MMHG | WEIGHT: 134.69 LBS | SYSTOLIC BLOOD PRESSURE: 117 MMHG

## 2021-10-21 DIAGNOSIS — C78.7 METASTATIC COLON CANCER TO LIVER: Primary | ICD-10-CM

## 2021-10-21 DIAGNOSIS — C18.9 METASTATIC COLON CANCER TO LIVER: Primary | ICD-10-CM

## 2021-10-21 PROCEDURE — 96413 CHEMO IV INFUSION 1 HR: CPT

## 2021-10-21 PROCEDURE — 25000003 PHARM REV CODE 250: Performed by: INTERNAL MEDICINE

## 2021-10-21 PROCEDURE — 63600175 PHARM REV CODE 636 W HCPCS: Performed by: INTERNAL MEDICINE

## 2021-10-21 PROCEDURE — 96416 CHEMO PROLONG INFUSE W/PUMP: CPT

## 2021-10-21 PROCEDURE — 96375 TX/PRO/DX INJ NEW DRUG ADDON: CPT

## 2021-10-21 RX ORDER — DIPHENHYDRAMINE HYDROCHLORIDE 50 MG/ML
50 INJECTION INTRAMUSCULAR; INTRAVENOUS ONCE AS NEEDED
Status: CANCELLED | OUTPATIENT
Start: 2021-10-21

## 2021-10-21 RX ORDER — SODIUM CHLORIDE 0.9 % (FLUSH) 0.9 %
10 SYRINGE (ML) INJECTION
Status: DISCONTINUED | OUTPATIENT
Start: 2021-10-21 | End: 2021-10-21 | Stop reason: HOSPADM

## 2021-10-21 RX ORDER — SODIUM CHLORIDE 0.9 % (FLUSH) 0.9 %
10 SYRINGE (ML) INJECTION
Status: CANCELLED | OUTPATIENT
Start: 2021-10-21

## 2021-10-21 RX ORDER — ONDANSETRON 2 MG/ML
8 INJECTION INTRAMUSCULAR; INTRAVENOUS
Status: CANCELLED
Start: 2021-10-21

## 2021-10-21 RX ORDER — HEPARIN 100 UNIT/ML
500 SYRINGE INTRAVENOUS
Status: DISCONTINUED | OUTPATIENT
Start: 2021-10-21 | End: 2021-10-21 | Stop reason: HOSPADM

## 2021-10-21 RX ORDER — EPINEPHRINE 0.3 MG/.3ML
0.3 INJECTION SUBCUTANEOUS ONCE AS NEEDED
Status: DISCONTINUED | OUTPATIENT
Start: 2021-10-21 | End: 2021-10-21 | Stop reason: HOSPADM

## 2021-10-21 RX ORDER — HEPARIN 100 UNIT/ML
500 SYRINGE INTRAVENOUS
Status: CANCELLED | OUTPATIENT
Start: 2021-10-23

## 2021-10-21 RX ORDER — ONDANSETRON 2 MG/ML
8 INJECTION INTRAMUSCULAR; INTRAVENOUS
Status: COMPLETED | OUTPATIENT
Start: 2021-10-21 | End: 2021-10-21

## 2021-10-21 RX ORDER — SODIUM CHLORIDE 0.9 % (FLUSH) 0.9 %
10 SYRINGE (ML) INJECTION
Status: CANCELLED | OUTPATIENT
Start: 2021-10-23

## 2021-10-21 RX ORDER — EPINEPHRINE 0.3 MG/.3ML
0.3 INJECTION SUBCUTANEOUS ONCE AS NEEDED
Status: CANCELLED | OUTPATIENT
Start: 2021-10-21

## 2021-10-21 RX ORDER — DIPHENHYDRAMINE HYDROCHLORIDE 50 MG/ML
50 INJECTION INTRAMUSCULAR; INTRAVENOUS ONCE AS NEEDED
Status: DISCONTINUED | OUTPATIENT
Start: 2021-10-21 | End: 2021-10-21 | Stop reason: HOSPADM

## 2021-10-21 RX ORDER — HEPARIN 100 UNIT/ML
500 SYRINGE INTRAVENOUS
Status: CANCELLED | OUTPATIENT
Start: 2021-10-21

## 2021-10-21 RX ADMIN — BEVACIZUMAB 285 MG: 100 INJECTION, SOLUTION INTRAVENOUS at 10:10

## 2021-10-21 RX ADMIN — FLUOROURACIL 3935 MG: 50 INJECTION, SOLUTION INTRAVENOUS at 11:10

## 2021-10-21 RX ADMIN — ONDANSETRON 8 MG: 2 INJECTION, SOLUTION INTRAMUSCULAR; INTRAVENOUS at 09:10

## 2021-10-21 RX ADMIN — SODIUM CHLORIDE: 9 INJECTION, SOLUTION INTRAVENOUS at 08:10

## 2021-10-23 ENCOUNTER — INFUSION (OUTPATIENT)
Dept: INFUSION THERAPY | Facility: HOSPITAL | Age: 71
End: 2021-10-23
Attending: STUDENT IN AN ORGANIZED HEALTH CARE EDUCATION/TRAINING PROGRAM
Payer: MEDICARE

## 2021-10-23 VITALS
BODY MASS INDEX: 21.14 KG/M2 | HEIGHT: 67 IN | SYSTOLIC BLOOD PRESSURE: 122 MMHG | WEIGHT: 134.69 LBS | DIASTOLIC BLOOD PRESSURE: 76 MMHG | TEMPERATURE: 98 F | HEART RATE: 69 BPM | RESPIRATION RATE: 18 BRPM

## 2021-10-23 DIAGNOSIS — C78.7 METASTATIC COLON CANCER TO LIVER: Primary | ICD-10-CM

## 2021-10-23 DIAGNOSIS — C18.9 METASTATIC COLON CANCER TO LIVER: Primary | ICD-10-CM

## 2021-10-23 PROCEDURE — A4216 STERILE WATER/SALINE, 10 ML: HCPCS | Performed by: INTERNAL MEDICINE

## 2021-10-23 PROCEDURE — 25000003 PHARM REV CODE 250: Performed by: INTERNAL MEDICINE

## 2021-10-23 PROCEDURE — 63600175 PHARM REV CODE 636 W HCPCS: Performed by: INTERNAL MEDICINE

## 2021-10-23 RX ORDER — HEPARIN 100 UNIT/ML
500 SYRINGE INTRAVENOUS
Status: DISCONTINUED | OUTPATIENT
Start: 2021-10-23 | End: 2021-10-23 | Stop reason: HOSPADM

## 2021-10-23 RX ORDER — SODIUM CHLORIDE 0.9 % (FLUSH) 0.9 %
10 SYRINGE (ML) INJECTION
Status: DISCONTINUED | OUTPATIENT
Start: 2021-10-23 | End: 2021-10-23 | Stop reason: HOSPADM

## 2021-10-23 RX ADMIN — HEPARIN 500 UNITS: 100 SYRINGE at 09:10

## 2021-10-23 RX ADMIN — Medication 10 ML: at 09:10

## 2021-11-04 ENCOUNTER — OFFICE VISIT (OUTPATIENT)
Dept: HEMATOLOGY/ONCOLOGY | Facility: CLINIC | Age: 71
End: 2021-11-04
Payer: MEDICARE

## 2021-11-04 ENCOUNTER — INFUSION (OUTPATIENT)
Dept: INFUSION THERAPY | Facility: HOSPITAL | Age: 71
End: 2021-11-04
Payer: MEDICARE

## 2021-11-04 VITALS
DIASTOLIC BLOOD PRESSURE: 72 MMHG | TEMPERATURE: 98 F | BODY MASS INDEX: 21.07 KG/M2 | HEART RATE: 79 BPM | HEIGHT: 67 IN | SYSTOLIC BLOOD PRESSURE: 132 MMHG | WEIGHT: 134.25 LBS | RESPIRATION RATE: 18 BRPM

## 2021-11-04 VITALS
BODY MASS INDEX: 21.07 KG/M2 | TEMPERATURE: 98 F | WEIGHT: 134.25 LBS | DIASTOLIC BLOOD PRESSURE: 74 MMHG | SYSTOLIC BLOOD PRESSURE: 138 MMHG | OXYGEN SATURATION: 100 % | HEART RATE: 79 BPM | HEIGHT: 67 IN

## 2021-11-04 DIAGNOSIS — C18.9 COLON CANCER METASTASIZED TO LIVER: Primary | ICD-10-CM

## 2021-11-04 DIAGNOSIS — I10 HYPERTENSION, UNSPECIFIED TYPE: ICD-10-CM

## 2021-11-04 DIAGNOSIS — C78.7 COLON CANCER METASTASIZED TO LIVER: Primary | ICD-10-CM

## 2021-11-04 DIAGNOSIS — C18.9 METASTATIC COLON CANCER TO LIVER: Primary | ICD-10-CM

## 2021-11-04 DIAGNOSIS — C78.7 METASTATIC COLON CANCER TO LIVER: Primary | ICD-10-CM

## 2021-11-04 LAB
BACTERIA #/AREA URNS AUTO: NORMAL /HPF
BILIRUB UR QL STRIP: NEGATIVE
CLARITY UR REFRACT.AUTO: CLEAR
COLOR UR AUTO: YELLOW
GLUCOSE UR QL STRIP: NEGATIVE
HGB UR QL STRIP: ABNORMAL
KETONES UR QL STRIP: NEGATIVE
LEUKOCYTE ESTERASE UR QL STRIP: NEGATIVE
MICROSCOPIC COMMENT: NORMAL
NITRITE UR QL STRIP: NEGATIVE
PH UR STRIP: 5 [PH] (ref 5–8)
PROT UR QL STRIP: NEGATIVE
RBC #/AREA URNS AUTO: 0 /HPF (ref 0–4)
SP GR UR STRIP: 1.02 (ref 1–1.03)
SQUAMOUS #/AREA URNS AUTO: 0 /HPF
URN SPEC COLLECT METH UR: ABNORMAL
WBC #/AREA URNS AUTO: 1 /HPF (ref 0–5)

## 2021-11-04 PROCEDURE — 99999 PR PBB SHADOW E&M-EST. PATIENT-LVL III: ICD-10-PCS | Mod: PBBFAC,GC,, | Performed by: STUDENT IN AN ORGANIZED HEALTH CARE EDUCATION/TRAINING PROGRAM

## 2021-11-04 PROCEDURE — 99213 OFFICE O/P EST LOW 20 MIN: CPT | Mod: PBBFAC,25 | Performed by: STUDENT IN AN ORGANIZED HEALTH CARE EDUCATION/TRAINING PROGRAM

## 2021-11-04 PROCEDURE — 99999 PR PBB SHADOW E&M-EST. PATIENT-LVL III: CPT | Mod: PBBFAC,GC,, | Performed by: STUDENT IN AN ORGANIZED HEALTH CARE EDUCATION/TRAINING PROGRAM

## 2021-11-04 PROCEDURE — 96413 CHEMO IV INFUSION 1 HR: CPT

## 2021-11-04 PROCEDURE — 63600175 PHARM REV CODE 636 W HCPCS: Mod: JG | Performed by: INTERNAL MEDICINE

## 2021-11-04 PROCEDURE — 99215 OFFICE O/P EST HI 40 MIN: CPT | Mod: S$PBB,GC,, | Performed by: STUDENT IN AN ORGANIZED HEALTH CARE EDUCATION/TRAINING PROGRAM

## 2021-11-04 PROCEDURE — 99215 PR OFFICE/OUTPT VISIT, EST, LEVL V, 40-54 MIN: ICD-10-PCS | Mod: S$PBB,GC,, | Performed by: STUDENT IN AN ORGANIZED HEALTH CARE EDUCATION/TRAINING PROGRAM

## 2021-11-04 PROCEDURE — 96375 TX/PRO/DX INJ NEW DRUG ADDON: CPT

## 2021-11-04 PROCEDURE — 96416 CHEMO PROLONG INFUSE W/PUMP: CPT

## 2021-11-04 PROCEDURE — 81001 URINALYSIS AUTO W/SCOPE: CPT | Performed by: STUDENT IN AN ORGANIZED HEALTH CARE EDUCATION/TRAINING PROGRAM

## 2021-11-04 PROCEDURE — 25000003 PHARM REV CODE 250: Performed by: INTERNAL MEDICINE

## 2021-11-04 RX ORDER — LIDOCAINE AND PRILOCAINE 25; 25 MG/G; MG/G
CREAM TOPICAL
Qty: 30 G | Refills: 5 | Status: ON HOLD | OUTPATIENT
Start: 2021-11-04 | End: 2022-11-02 | Stop reason: HOSPADM

## 2021-11-04 RX ORDER — EPINEPHRINE 0.3 MG/.3ML
0.3 INJECTION SUBCUTANEOUS ONCE AS NEEDED
Status: CANCELLED | OUTPATIENT
Start: 2021-11-04

## 2021-11-04 RX ORDER — HEPARIN 100 UNIT/ML
500 SYRINGE INTRAVENOUS
Status: DISCONTINUED | OUTPATIENT
Start: 2021-11-04 | End: 2021-11-04 | Stop reason: HOSPADM

## 2021-11-04 RX ORDER — ONDANSETRON 2 MG/ML
8 INJECTION INTRAMUSCULAR; INTRAVENOUS
Status: CANCELLED
Start: 2021-11-04

## 2021-11-04 RX ORDER — EPINEPHRINE 0.3 MG/.3ML
0.3 INJECTION SUBCUTANEOUS ONCE AS NEEDED
Status: DISCONTINUED | OUTPATIENT
Start: 2021-11-04 | End: 2021-11-04 | Stop reason: HOSPADM

## 2021-11-04 RX ORDER — SODIUM CHLORIDE 0.9 % (FLUSH) 0.9 %
10 SYRINGE (ML) INJECTION
Status: DISCONTINUED | OUTPATIENT
Start: 2021-11-04 | End: 2021-11-04 | Stop reason: HOSPADM

## 2021-11-04 RX ORDER — HEPARIN 100 UNIT/ML
500 SYRINGE INTRAVENOUS
Status: CANCELLED | OUTPATIENT
Start: 2021-11-04

## 2021-11-04 RX ORDER — ONDANSETRON 2 MG/ML
8 INJECTION INTRAMUSCULAR; INTRAVENOUS
Status: COMPLETED | OUTPATIENT
Start: 2021-11-04 | End: 2021-11-04

## 2021-11-04 RX ORDER — DIPHENHYDRAMINE HYDROCHLORIDE 50 MG/ML
50 INJECTION INTRAMUSCULAR; INTRAVENOUS ONCE AS NEEDED
Status: CANCELLED | OUTPATIENT
Start: 2021-11-04

## 2021-11-04 RX ORDER — SODIUM CHLORIDE 0.9 % (FLUSH) 0.9 %
10 SYRINGE (ML) INJECTION
Status: CANCELLED | OUTPATIENT
Start: 2021-11-04

## 2021-11-04 RX ORDER — HEPARIN 100 UNIT/ML
500 SYRINGE INTRAVENOUS
Status: CANCELLED | OUTPATIENT
Start: 2021-11-06

## 2021-11-04 RX ORDER — SODIUM CHLORIDE 0.9 % (FLUSH) 0.9 %
10 SYRINGE (ML) INJECTION
Status: CANCELLED | OUTPATIENT
Start: 2021-11-06

## 2021-11-04 RX ORDER — DIPHENHYDRAMINE HYDROCHLORIDE 50 MG/ML
50 INJECTION INTRAMUSCULAR; INTRAVENOUS ONCE AS NEEDED
Status: DISCONTINUED | OUTPATIENT
Start: 2021-11-04 | End: 2021-11-04 | Stop reason: HOSPADM

## 2021-11-04 RX ADMIN — ONDANSETRON 8 MG: 2 INJECTION, SOLUTION INTRAMUSCULAR; INTRAVENOUS at 11:11

## 2021-11-04 RX ADMIN — SODIUM CHLORIDE: 9 INJECTION, SOLUTION INTRAVENOUS at 11:11

## 2021-11-04 RX ADMIN — BEVACIZUMAB 285 MG: 100 INJECTION, SOLUTION INTRAVENOUS at 11:11

## 2021-11-04 RX ADMIN — FLUOROURACIL 3935 MG: 50 INJECTION, SOLUTION INTRAVENOUS at 12:11

## 2021-11-06 ENCOUNTER — INFUSION (OUTPATIENT)
Dept: INFUSION THERAPY | Facility: HOSPITAL | Age: 71
End: 2021-11-06
Attending: STUDENT IN AN ORGANIZED HEALTH CARE EDUCATION/TRAINING PROGRAM
Payer: MEDICARE

## 2021-11-06 VITALS
TEMPERATURE: 98 F | SYSTOLIC BLOOD PRESSURE: 111 MMHG | HEART RATE: 78 BPM | WEIGHT: 134.25 LBS | RESPIRATION RATE: 18 BRPM | HEIGHT: 67 IN | DIASTOLIC BLOOD PRESSURE: 79 MMHG | BODY MASS INDEX: 21.07 KG/M2

## 2021-11-06 DIAGNOSIS — C18.9 METASTATIC COLON CANCER TO LIVER: Primary | ICD-10-CM

## 2021-11-06 DIAGNOSIS — C78.7 METASTATIC COLON CANCER TO LIVER: Primary | ICD-10-CM

## 2021-11-06 PROCEDURE — 25000003 PHARM REV CODE 250: Performed by: INTERNAL MEDICINE

## 2021-11-06 PROCEDURE — A4216 STERILE WATER/SALINE, 10 ML: HCPCS | Performed by: INTERNAL MEDICINE

## 2021-11-06 PROCEDURE — 63600175 PHARM REV CODE 636 W HCPCS: Performed by: INTERNAL MEDICINE

## 2021-11-06 RX ORDER — SODIUM CHLORIDE 0.9 % (FLUSH) 0.9 %
10 SYRINGE (ML) INJECTION
Status: DISCONTINUED | OUTPATIENT
Start: 2021-11-06 | End: 2021-11-06 | Stop reason: HOSPADM

## 2021-11-06 RX ORDER — HEPARIN 100 UNIT/ML
500 SYRINGE INTRAVENOUS
Status: DISCONTINUED | OUTPATIENT
Start: 2021-11-06 | End: 2021-11-06 | Stop reason: HOSPADM

## 2021-11-06 RX ADMIN — Medication 10 ML: at 10:11

## 2021-11-06 RX ADMIN — HEPARIN 500 UNITS: 100 SYRINGE at 10:11

## 2021-11-18 ENCOUNTER — INFUSION (OUTPATIENT)
Dept: INFUSION THERAPY | Facility: HOSPITAL | Age: 71
End: 2021-11-18
Attending: STUDENT IN AN ORGANIZED HEALTH CARE EDUCATION/TRAINING PROGRAM
Payer: MEDICARE

## 2021-11-18 VITALS
BODY MASS INDEX: 21.28 KG/M2 | RESPIRATION RATE: 18 BRPM | HEIGHT: 67 IN | TEMPERATURE: 98 F | HEART RATE: 61 BPM | DIASTOLIC BLOOD PRESSURE: 69 MMHG | WEIGHT: 135.56 LBS | SYSTOLIC BLOOD PRESSURE: 123 MMHG

## 2021-11-18 DIAGNOSIS — C78.7 METASTATIC COLON CANCER TO LIVER: Primary | ICD-10-CM

## 2021-11-18 DIAGNOSIS — C18.9 METASTATIC COLON CANCER TO LIVER: Primary | ICD-10-CM

## 2021-11-18 PROCEDURE — 63600175 PHARM REV CODE 636 W HCPCS: Mod: JG | Performed by: INTERNAL MEDICINE

## 2021-11-18 PROCEDURE — 96416 CHEMO PROLONG INFUSE W/PUMP: CPT

## 2021-11-18 PROCEDURE — 96413 CHEMO IV INFUSION 1 HR: CPT

## 2021-11-18 PROCEDURE — 25000003 PHARM REV CODE 250: Performed by: INTERNAL MEDICINE

## 2021-11-18 PROCEDURE — 96375 TX/PRO/DX INJ NEW DRUG ADDON: CPT

## 2021-11-18 RX ORDER — DIPHENHYDRAMINE HYDROCHLORIDE 50 MG/ML
50 INJECTION INTRAMUSCULAR; INTRAVENOUS ONCE AS NEEDED
Status: DISCONTINUED | OUTPATIENT
Start: 2021-11-18 | End: 2021-11-18 | Stop reason: HOSPADM

## 2021-11-18 RX ORDER — SODIUM CHLORIDE 0.9 % (FLUSH) 0.9 %
10 SYRINGE (ML) INJECTION
Status: DISCONTINUED | OUTPATIENT
Start: 2021-11-18 | End: 2021-11-18 | Stop reason: HOSPADM

## 2021-11-18 RX ORDER — EPINEPHRINE 0.3 MG/.3ML
0.3 INJECTION SUBCUTANEOUS ONCE AS NEEDED
Status: CANCELLED | OUTPATIENT
Start: 2021-11-18

## 2021-11-18 RX ORDER — SODIUM CHLORIDE 0.9 % (FLUSH) 0.9 %
10 SYRINGE (ML) INJECTION
Status: CANCELLED | OUTPATIENT
Start: 2021-11-20

## 2021-11-18 RX ORDER — DIPHENHYDRAMINE HYDROCHLORIDE 50 MG/ML
50 INJECTION INTRAMUSCULAR; INTRAVENOUS ONCE AS NEEDED
Status: CANCELLED | OUTPATIENT
Start: 2021-11-18

## 2021-11-18 RX ORDER — EPINEPHRINE 0.3 MG/.3ML
0.3 INJECTION SUBCUTANEOUS ONCE AS NEEDED
Status: DISCONTINUED | OUTPATIENT
Start: 2021-11-18 | End: 2021-11-18 | Stop reason: HOSPADM

## 2021-11-18 RX ORDER — SODIUM CHLORIDE 0.9 % (FLUSH) 0.9 %
10 SYRINGE (ML) INJECTION
Status: CANCELLED | OUTPATIENT
Start: 2021-11-18

## 2021-11-18 RX ORDER — ONDANSETRON 2 MG/ML
8 INJECTION INTRAMUSCULAR; INTRAVENOUS
Status: CANCELLED
Start: 2021-11-18

## 2021-11-18 RX ORDER — ONDANSETRON 2 MG/ML
8 INJECTION INTRAMUSCULAR; INTRAVENOUS
Status: COMPLETED | OUTPATIENT
Start: 2021-11-18 | End: 2021-11-18

## 2021-11-18 RX ORDER — HEPARIN 100 UNIT/ML
500 SYRINGE INTRAVENOUS
Status: CANCELLED | OUTPATIENT
Start: 2021-11-20

## 2021-11-18 RX ORDER — HEPARIN 100 UNIT/ML
500 SYRINGE INTRAVENOUS
Status: CANCELLED | OUTPATIENT
Start: 2021-11-18

## 2021-11-18 RX ADMIN — ONDANSETRON 8 MG: 2 INJECTION, SOLUTION INTRAMUSCULAR; INTRAVENOUS at 09:11

## 2021-11-18 RX ADMIN — BEVACIZUMAB 285 MG: 100 INJECTION, SOLUTION INTRAVENOUS at 09:11

## 2021-11-18 RX ADMIN — FLUOROURACIL 3935 MG: 50 INJECTION, SOLUTION INTRAVENOUS at 10:11

## 2021-11-18 RX ADMIN — SODIUM CHLORIDE: 0.9 INJECTION, SOLUTION INTRAVENOUS at 08:11

## 2021-11-20 ENCOUNTER — INFUSION (OUTPATIENT)
Dept: INFUSION THERAPY | Facility: HOSPITAL | Age: 71
End: 2021-11-20
Payer: MEDICARE

## 2021-11-20 VITALS
SYSTOLIC BLOOD PRESSURE: 145 MMHG | HEART RATE: 69 BPM | TEMPERATURE: 98 F | DIASTOLIC BLOOD PRESSURE: 79 MMHG | RESPIRATION RATE: 18 BRPM

## 2021-11-20 DIAGNOSIS — C78.7 METASTATIC COLON CANCER TO LIVER: Primary | ICD-10-CM

## 2021-11-20 DIAGNOSIS — C18.9 METASTATIC COLON CANCER TO LIVER: Primary | ICD-10-CM

## 2021-11-20 PROCEDURE — 99211 OFF/OP EST MAY X REQ PHY/QHP: CPT

## 2021-11-20 PROCEDURE — 63600175 PHARM REV CODE 636 W HCPCS: Performed by: INTERNAL MEDICINE

## 2021-11-20 PROCEDURE — A4216 STERILE WATER/SALINE, 10 ML: HCPCS | Performed by: INTERNAL MEDICINE

## 2021-11-20 PROCEDURE — 25000003 PHARM REV CODE 250: Performed by: INTERNAL MEDICINE

## 2021-11-20 RX ORDER — HEPARIN 100 UNIT/ML
500 SYRINGE INTRAVENOUS
Status: DISCONTINUED | OUTPATIENT
Start: 2021-11-20 | End: 2021-11-20 | Stop reason: HOSPADM

## 2021-11-20 RX ORDER — SODIUM CHLORIDE 0.9 % (FLUSH) 0.9 %
10 SYRINGE (ML) INJECTION
Status: DISCONTINUED | OUTPATIENT
Start: 2021-11-20 | End: 2021-11-20 | Stop reason: HOSPADM

## 2021-11-20 RX ADMIN — HEPARIN 500 UNITS: 100 SYRINGE at 08:11

## 2021-11-20 RX ADMIN — Medication 10 ML: at 08:11

## 2021-12-02 ENCOUNTER — OFFICE VISIT (OUTPATIENT)
Dept: HEMATOLOGY/ONCOLOGY | Facility: CLINIC | Age: 71
End: 2021-12-02
Payer: MEDICARE

## 2021-12-02 ENCOUNTER — INFUSION (OUTPATIENT)
Dept: INFUSION THERAPY | Facility: HOSPITAL | Age: 71
End: 2021-12-02
Payer: MEDICARE

## 2021-12-02 VITALS
RESPIRATION RATE: 20 BRPM | SYSTOLIC BLOOD PRESSURE: 126 MMHG | BODY MASS INDEX: 21.63 KG/M2 | OXYGEN SATURATION: 99 % | HEART RATE: 69 BPM | DIASTOLIC BLOOD PRESSURE: 77 MMHG | WEIGHT: 137.81 LBS | TEMPERATURE: 98 F | HEIGHT: 67 IN

## 2021-12-02 VITALS — HEART RATE: 66 BPM | DIASTOLIC BLOOD PRESSURE: 64 MMHG | SYSTOLIC BLOOD PRESSURE: 123 MMHG

## 2021-12-02 DIAGNOSIS — C18.9 METASTATIC COLON CANCER TO LIVER: Primary | ICD-10-CM

## 2021-12-02 DIAGNOSIS — C18.9 COLON CANCER METASTASIZED TO LIVER: Primary | ICD-10-CM

## 2021-12-02 DIAGNOSIS — C78.7 COLON CANCER METASTASIZED TO LIVER: Primary | ICD-10-CM

## 2021-12-02 DIAGNOSIS — I10 HYPERTENSION, UNSPECIFIED TYPE: ICD-10-CM

## 2021-12-02 DIAGNOSIS — C78.7 METASTATIC COLON CANCER TO LIVER: Primary | ICD-10-CM

## 2021-12-02 LAB
BACTERIA #/AREA URNS AUTO: NORMAL /HPF
BILIRUB UR QL STRIP: NEGATIVE
CLARITY UR REFRACT.AUTO: CLEAR
COLOR UR AUTO: YELLOW
GLUCOSE UR QL STRIP: NEGATIVE
HGB UR QL STRIP: ABNORMAL
KETONES UR QL STRIP: NEGATIVE
LEUKOCYTE ESTERASE UR QL STRIP: NEGATIVE
MICROSCOPIC COMMENT: NORMAL
NITRITE UR QL STRIP: NEGATIVE
PH UR STRIP: 5 [PH] (ref 5–8)
PROT UR QL STRIP: NEGATIVE
RBC #/AREA URNS AUTO: 2 /HPF (ref 0–4)
SP GR UR STRIP: 1.02 (ref 1–1.03)
URN SPEC COLLECT METH UR: ABNORMAL
WBC #/AREA URNS AUTO: 0 /HPF (ref 0–5)

## 2021-12-02 PROCEDURE — 96416 CHEMO PROLONG INFUSE W/PUMP: CPT

## 2021-12-02 PROCEDURE — 99214 PR OFFICE/OUTPT VISIT, EST, LEVL IV, 30-39 MIN: ICD-10-PCS | Mod: S$PBB,GC,, | Performed by: STUDENT IN AN ORGANIZED HEALTH CARE EDUCATION/TRAINING PROGRAM

## 2021-12-02 PROCEDURE — 25000003 PHARM REV CODE 250: Performed by: INTERNAL MEDICINE

## 2021-12-02 PROCEDURE — 99999 PR PBB SHADOW E&M-EST. PATIENT-LVL III: ICD-10-PCS | Mod: PBBFAC,GC,, | Performed by: STUDENT IN AN ORGANIZED HEALTH CARE EDUCATION/TRAINING PROGRAM

## 2021-12-02 PROCEDURE — 96375 TX/PRO/DX INJ NEW DRUG ADDON: CPT

## 2021-12-02 PROCEDURE — 99999 PR PBB SHADOW E&M-EST. PATIENT-LVL III: CPT | Mod: PBBFAC,GC,, | Performed by: STUDENT IN AN ORGANIZED HEALTH CARE EDUCATION/TRAINING PROGRAM

## 2021-12-02 PROCEDURE — 99214 OFFICE O/P EST MOD 30 MIN: CPT | Mod: S$PBB,GC,, | Performed by: STUDENT IN AN ORGANIZED HEALTH CARE EDUCATION/TRAINING PROGRAM

## 2021-12-02 PROCEDURE — 99213 OFFICE O/P EST LOW 20 MIN: CPT | Mod: PBBFAC,25 | Performed by: STUDENT IN AN ORGANIZED HEALTH CARE EDUCATION/TRAINING PROGRAM

## 2021-12-02 PROCEDURE — 81001 URINALYSIS AUTO W/SCOPE: CPT | Performed by: STUDENT IN AN ORGANIZED HEALTH CARE EDUCATION/TRAINING PROGRAM

## 2021-12-02 PROCEDURE — 63600175 PHARM REV CODE 636 W HCPCS: Mod: JG | Performed by: INTERNAL MEDICINE

## 2021-12-02 PROCEDURE — 96413 CHEMO IV INFUSION 1 HR: CPT

## 2021-12-02 RX ORDER — ONDANSETRON 2 MG/ML
8 INJECTION INTRAMUSCULAR; INTRAVENOUS
Status: COMPLETED | OUTPATIENT
Start: 2021-12-02 | End: 2021-12-02

## 2021-12-02 RX ORDER — HEPARIN 100 UNIT/ML
500 SYRINGE INTRAVENOUS
Status: CANCELLED | OUTPATIENT
Start: 2021-12-04

## 2021-12-02 RX ORDER — HEPARIN 100 UNIT/ML
500 SYRINGE INTRAVENOUS
Status: DISCONTINUED | OUTPATIENT
Start: 2021-12-02 | End: 2021-12-02 | Stop reason: HOSPADM

## 2021-12-02 RX ORDER — DIPHENHYDRAMINE HYDROCHLORIDE 50 MG/ML
50 INJECTION INTRAMUSCULAR; INTRAVENOUS ONCE AS NEEDED
Status: DISCONTINUED | OUTPATIENT
Start: 2021-12-02 | End: 2021-12-02 | Stop reason: HOSPADM

## 2021-12-02 RX ORDER — EPINEPHRINE 0.3 MG/.3ML
0.3 INJECTION SUBCUTANEOUS ONCE AS NEEDED
Status: CANCELLED | OUTPATIENT
Start: 2021-12-02

## 2021-12-02 RX ORDER — ONDANSETRON 2 MG/ML
8 INJECTION INTRAMUSCULAR; INTRAVENOUS
Status: CANCELLED
Start: 2021-12-02

## 2021-12-02 RX ORDER — SODIUM CHLORIDE 0.9 % (FLUSH) 0.9 %
10 SYRINGE (ML) INJECTION
Status: DISCONTINUED | OUTPATIENT
Start: 2021-12-02 | End: 2021-12-02 | Stop reason: HOSPADM

## 2021-12-02 RX ORDER — SODIUM CHLORIDE 0.9 % (FLUSH) 0.9 %
10 SYRINGE (ML) INJECTION
Status: CANCELLED | OUTPATIENT
Start: 2021-12-02

## 2021-12-02 RX ORDER — DIPHENHYDRAMINE HYDROCHLORIDE 50 MG/ML
50 INJECTION INTRAMUSCULAR; INTRAVENOUS ONCE AS NEEDED
Status: CANCELLED | OUTPATIENT
Start: 2021-12-02

## 2021-12-02 RX ORDER — SODIUM CHLORIDE 0.9 % (FLUSH) 0.9 %
10 SYRINGE (ML) INJECTION
Status: CANCELLED | OUTPATIENT
Start: 2021-12-04

## 2021-12-02 RX ORDER — EPINEPHRINE 0.3 MG/.3ML
0.3 INJECTION SUBCUTANEOUS ONCE AS NEEDED
Status: DISCONTINUED | OUTPATIENT
Start: 2021-12-02 | End: 2021-12-02 | Stop reason: HOSPADM

## 2021-12-02 RX ORDER — HEPARIN 100 UNIT/ML
500 SYRINGE INTRAVENOUS
Status: CANCELLED | OUTPATIENT
Start: 2021-12-02

## 2021-12-02 RX ADMIN — BEVACIZUMAB 285 MG: 100 INJECTION, SOLUTION INTRAVENOUS at 10:12

## 2021-12-02 RX ADMIN — FLUOROURACIL 3935 MG: 50 INJECTION, SOLUTION INTRAVENOUS at 10:12

## 2021-12-02 RX ADMIN — SODIUM CHLORIDE: 0.9 INJECTION, SOLUTION INTRAVENOUS at 09:12

## 2021-12-02 RX ADMIN — ONDANSETRON 8 MG: 2 INJECTION, SOLUTION INTRAMUSCULAR; INTRAVENOUS at 10:12

## 2021-12-04 ENCOUNTER — INFUSION (OUTPATIENT)
Dept: INFUSION THERAPY | Facility: HOSPITAL | Age: 71
End: 2021-12-04
Payer: MEDICARE

## 2021-12-04 VITALS
TEMPERATURE: 98 F | RESPIRATION RATE: 18 BRPM | SYSTOLIC BLOOD PRESSURE: 141 MMHG | DIASTOLIC BLOOD PRESSURE: 74 MMHG | HEART RATE: 68 BPM

## 2021-12-04 DIAGNOSIS — C78.7 METASTATIC COLON CANCER TO LIVER: Primary | ICD-10-CM

## 2021-12-04 DIAGNOSIS — C18.9 METASTATIC COLON CANCER TO LIVER: Primary | ICD-10-CM

## 2021-12-04 PROCEDURE — 63600175 PHARM REV CODE 636 W HCPCS: Performed by: INTERNAL MEDICINE

## 2021-12-04 PROCEDURE — A4216 STERILE WATER/SALINE, 10 ML: HCPCS | Performed by: INTERNAL MEDICINE

## 2021-12-04 PROCEDURE — 25000003 PHARM REV CODE 250: Performed by: INTERNAL MEDICINE

## 2021-12-04 RX ORDER — HEPARIN 100 UNIT/ML
500 SYRINGE INTRAVENOUS
Status: DISCONTINUED | OUTPATIENT
Start: 2021-12-04 | End: 2021-12-04 | Stop reason: HOSPADM

## 2021-12-04 RX ORDER — SODIUM CHLORIDE 0.9 % (FLUSH) 0.9 %
10 SYRINGE (ML) INJECTION
Status: DISCONTINUED | OUTPATIENT
Start: 2021-12-04 | End: 2021-12-04 | Stop reason: HOSPADM

## 2021-12-04 RX ADMIN — HEPARIN 500 UNITS: 100 SYRINGE at 09:12

## 2021-12-04 RX ADMIN — Medication 10 ML: at 09:12

## 2021-12-14 RX ORDER — ONDANSETRON 2 MG/ML
8 INJECTION INTRAMUSCULAR; INTRAVENOUS
Status: CANCELLED
Start: 2021-12-16

## 2021-12-14 RX ORDER — HEPARIN 100 UNIT/ML
500 SYRINGE INTRAVENOUS
Status: CANCELLED | OUTPATIENT
Start: 2021-12-18

## 2021-12-14 RX ORDER — SODIUM CHLORIDE 0.9 % (FLUSH) 0.9 %
10 SYRINGE (ML) INJECTION
Status: CANCELLED | OUTPATIENT
Start: 2021-12-16

## 2021-12-14 RX ORDER — EPINEPHRINE 0.3 MG/.3ML
0.3 INJECTION SUBCUTANEOUS ONCE AS NEEDED
Status: CANCELLED | OUTPATIENT
Start: 2021-12-16

## 2021-12-14 RX ORDER — SODIUM CHLORIDE 0.9 % (FLUSH) 0.9 %
10 SYRINGE (ML) INJECTION
Status: CANCELLED | OUTPATIENT
Start: 2021-12-18

## 2021-12-14 RX ORDER — DIPHENHYDRAMINE HYDROCHLORIDE 50 MG/ML
50 INJECTION INTRAMUSCULAR; INTRAVENOUS ONCE AS NEEDED
Status: CANCELLED | OUTPATIENT
Start: 2021-12-16

## 2021-12-14 RX ORDER — HEPARIN 100 UNIT/ML
500 SYRINGE INTRAVENOUS
Status: CANCELLED | OUTPATIENT
Start: 2021-12-16

## 2021-12-16 ENCOUNTER — INFUSION (OUTPATIENT)
Dept: INFUSION THERAPY | Facility: HOSPITAL | Age: 71
End: 2021-12-16
Payer: MEDICARE

## 2021-12-16 VITALS
SYSTOLIC BLOOD PRESSURE: 145 MMHG | BODY MASS INDEX: 21.07 KG/M2 | TEMPERATURE: 98 F | RESPIRATION RATE: 17 BRPM | OXYGEN SATURATION: 98 % | DIASTOLIC BLOOD PRESSURE: 76 MMHG | WEIGHT: 134.25 LBS | HEART RATE: 67 BPM | HEIGHT: 67 IN

## 2021-12-16 DIAGNOSIS — C78.7 METASTATIC COLON CANCER TO LIVER: Primary | ICD-10-CM

## 2021-12-16 DIAGNOSIS — C18.9 METASTATIC COLON CANCER TO LIVER: Primary | ICD-10-CM

## 2021-12-16 PROCEDURE — 25000003 PHARM REV CODE 250: Performed by: INTERNAL MEDICINE

## 2021-12-16 PROCEDURE — 96375 TX/PRO/DX INJ NEW DRUG ADDON: CPT

## 2021-12-16 PROCEDURE — 96413 CHEMO IV INFUSION 1 HR: CPT

## 2021-12-16 PROCEDURE — 96416 CHEMO PROLONG INFUSE W/PUMP: CPT

## 2021-12-16 PROCEDURE — 63600175 PHARM REV CODE 636 W HCPCS: Performed by: INTERNAL MEDICINE

## 2021-12-16 RX ORDER — DIPHENHYDRAMINE HYDROCHLORIDE 50 MG/ML
50 INJECTION INTRAMUSCULAR; INTRAVENOUS ONCE AS NEEDED
Status: DISCONTINUED | OUTPATIENT
Start: 2021-12-16 | End: 2021-12-16 | Stop reason: HOSPADM

## 2021-12-16 RX ORDER — ONDANSETRON 2 MG/ML
8 INJECTION INTRAMUSCULAR; INTRAVENOUS
Status: COMPLETED | OUTPATIENT
Start: 2021-12-16 | End: 2021-12-16

## 2021-12-16 RX ORDER — SODIUM CHLORIDE 0.9 % (FLUSH) 0.9 %
10 SYRINGE (ML) INJECTION
Status: DISCONTINUED | OUTPATIENT
Start: 2021-12-16 | End: 2021-12-16 | Stop reason: HOSPADM

## 2021-12-16 RX ORDER — HEPARIN 100 UNIT/ML
500 SYRINGE INTRAVENOUS
Status: DISCONTINUED | OUTPATIENT
Start: 2021-12-16 | End: 2021-12-16 | Stop reason: HOSPADM

## 2021-12-16 RX ORDER — EPINEPHRINE 0.3 MG/.3ML
0.3 INJECTION SUBCUTANEOUS ONCE AS NEEDED
Status: DISCONTINUED | OUTPATIENT
Start: 2021-12-16 | End: 2021-12-16 | Stop reason: HOSPADM

## 2021-12-16 RX ADMIN — FLUOROURACIL 4000 MG: 50 INJECTION, SOLUTION INTRAVENOUS at 10:12

## 2021-12-16 RX ADMIN — SODIUM CHLORIDE: 0.9 INJECTION, SOLUTION INTRAVENOUS at 08:12

## 2021-12-16 RX ADMIN — BEVACIZUMAB 300 MG: 100 INJECTION, SOLUTION INTRAVENOUS at 09:12

## 2021-12-16 RX ADMIN — ONDANSETRON 8 MG: 2 INJECTION, SOLUTION INTRAMUSCULAR; INTRAVENOUS at 08:12

## 2021-12-18 ENCOUNTER — INFUSION (OUTPATIENT)
Dept: INFUSION THERAPY | Facility: HOSPITAL | Age: 71
End: 2021-12-18
Attending: STUDENT IN AN ORGANIZED HEALTH CARE EDUCATION/TRAINING PROGRAM
Payer: MEDICARE

## 2021-12-18 VITALS
DIASTOLIC BLOOD PRESSURE: 75 MMHG | SYSTOLIC BLOOD PRESSURE: 135 MMHG | RESPIRATION RATE: 18 BRPM | HEART RATE: 65 BPM | TEMPERATURE: 98 F

## 2021-12-18 DIAGNOSIS — C18.9 METASTATIC COLON CANCER TO LIVER: Primary | ICD-10-CM

## 2021-12-18 DIAGNOSIS — C78.7 METASTATIC COLON CANCER TO LIVER: Primary | ICD-10-CM

## 2021-12-18 PROCEDURE — 99211 OFF/OP EST MAY X REQ PHY/QHP: CPT

## 2021-12-18 PROCEDURE — 25000003 PHARM REV CODE 250: Performed by: INTERNAL MEDICINE

## 2021-12-18 PROCEDURE — A4216 STERILE WATER/SALINE, 10 ML: HCPCS | Performed by: INTERNAL MEDICINE

## 2021-12-18 PROCEDURE — 63600175 PHARM REV CODE 636 W HCPCS: Performed by: INTERNAL MEDICINE

## 2021-12-18 RX ORDER — SODIUM CHLORIDE 0.9 % (FLUSH) 0.9 %
10 SYRINGE (ML) INJECTION
Status: DISCONTINUED | OUTPATIENT
Start: 2021-12-18 | End: 2021-12-18 | Stop reason: HOSPADM

## 2021-12-18 RX ORDER — HEPARIN 100 UNIT/ML
500 SYRINGE INTRAVENOUS
Status: DISCONTINUED | OUTPATIENT
Start: 2021-12-18 | End: 2021-12-18 | Stop reason: HOSPADM

## 2021-12-18 RX ADMIN — HEPARIN 500 UNITS: 100 SYRINGE at 08:12

## 2021-12-18 RX ADMIN — Medication 10 ML: at 08:12

## 2021-12-28 ENCOUNTER — HOSPITAL ENCOUNTER (OUTPATIENT)
Dept: RADIOLOGY | Facility: HOSPITAL | Age: 71
Discharge: HOME | End: 2021-12-28
Attending: STUDENT IN AN ORGANIZED HEALTH CARE EDUCATION/TRAINING PROGRAM
Payer: MEDICARE

## 2021-12-28 DIAGNOSIS — C18.9 COLON CANCER METASTASIZED TO LIVER: ICD-10-CM

## 2021-12-28 DIAGNOSIS — C78.7 COLON CANCER METASTASIZED TO LIVER: ICD-10-CM

## 2021-12-28 LAB
CREAT SERPL-MCNC: 1.1 MG/DL (ref 0.5–1.4)
SAMPLE: NORMAL

## 2021-12-28 PROCEDURE — 25500020 PHARM REV CODE 255: Performed by: STUDENT IN AN ORGANIZED HEALTH CARE EDUCATION/TRAINING PROGRAM

## 2021-12-28 PROCEDURE — 71260 CT CHEST ABDOMEN PELVIS WITH CONTRAST (XPD): ICD-10-PCS | Mod: 26,,, | Performed by: RADIOLOGY

## 2021-12-28 PROCEDURE — 74177 CT CHEST ABDOMEN PELVIS WITH CONTRAST (XPD): ICD-10-PCS | Mod: 26,,, | Performed by: RADIOLOGY

## 2021-12-28 PROCEDURE — 71260 CT THORAX DX C+: CPT | Mod: 26,,, | Performed by: RADIOLOGY

## 2021-12-28 PROCEDURE — 71260 CT THORAX DX C+: CPT | Mod: TC

## 2021-12-28 PROCEDURE — 74177 CT ABD & PELVIS W/CONTRAST: CPT | Mod: 26,,, | Performed by: RADIOLOGY

## 2021-12-28 PROCEDURE — 74177 CT ABD & PELVIS W/CONTRAST: CPT | Mod: TC

## 2021-12-28 RX ADMIN — IOHEXOL 75 ML: 350 INJECTION, SOLUTION INTRAVENOUS at 10:12

## 2021-12-29 NOTE — PROGRESS NOTES
"[facility]  [facility]  Hematology/Medical Oncology Clinic       PATIENT: Javier Downs  MRN: [id number]  DATE: 12/30/2021    Diagnosis: Transverse colon metastatic cancer to the liver     Oncological history:   04/20/2021: metastatic colon cancer to the liver, moderately differentiated, pMMR  05/06/2021: C1 mFOLFOX + Pema  05/20/2021: C2 mFOLFOX + Pema  06/03/2021: C3 mFOLFOX + Pema   06/17/2021: C4 mFOLFOX + Pema  07/01/2021: C5 mFOLFOX + Pema  07/15/2021: C6 mFOLFOX + Pema  Restaging CT CAP: significant improvement of lesions   07/29/2021: C7 mFOLFOX + Pema  08/12/2021: C8 5FU+Pema  08/26/2021: C9 5FU+Pema  09/09/2021: C10 5FU+Pema  09/23/2021: C11 5FU+Pema  10/06/2021: C12 5FU+Pema  10/21/2021: C13 5FU+Pema   11/04/2021: C14 5FU+Pema   12/02/2021: C16 5FU+Pema  12/30/2021: C18 5FU+Pema      Initial History:  Mr. Downs is a 71 y.o. male who returns following hospital discharge.  70 years old pleasant AA gentleman, with history of hypertension, presenting with two weeks duration of abdominal cramps, diarrhea, nausea and vomiting. His symptoms started gradually with intermittent generalize crampy abdominal pain, worse with food. That was associated with nausea, reflux and occaisonal vomiting. He tried pepto-bismol and imodium with no relief, and hence he presented to ER.   He lost significant amount of weight, but cannot quantify the amount or the time period. He has also been feeling weaker than usual.   He hasn't seen a healthcare provider in over than 10 years. He has never had a colonoscopy.   In the ER. His labs were significant of microcytic anemia of 9.7 g/dl, MCV 77, and Platelets counts of 495. CT of the abdomen and pelvis showed " Large mass in the transverse colon highly suspicious for adenocarcinoma resulting in at least high-grade partial obstruction with dilation of proximal colon and small bowel. Soft tissue nodules in the adjacent mesentery are suspicious for pily metastases " "and/or mesenteric implants.  Numerous hepatic masses measuring up to 11.2 cm most likely related to metastatic disease.  There also several small subcapsular lesions which could reflect additional metastatic disease"   CT chest was negative to metastatic disease.   He underwent colonoscopy and stent placement. 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 HER 2 negative, VIRI/SHERI NGS was cancelled due to insufficient quantity     He started palliative chemotherapy with FOLFOX+Pema     Restaging scans on 07/22/2021 showed significant decrease in size trasverse colon mass as well liver metastatic lesion.     He was switched to maintenance 5FU+Pema from C8    Restaging scans from 10/05/2021 (after C11) showing stable-mildly improved liver mets.     Interval History:   He presents today with his daughter prior to cycle 18   He is tolerating treatment with no issues. He is feeling overall well. He denies nausea, vomiting, constipation. He has good appetite.      Past Medical History:   Past Medical History:   Diagnosis Date    Hypertension     Metastatic colon cancer to liver 4/22/2021       Past Surgical HIstory:   Past Surgical History:   Procedure Laterality Date    COLONOSCOPY N/A 4/20/2021    Procedure: COLONOSCOPY with possible stent;  Surgeon: EDILMA Bonilla MD;  Location: [facility] ([address] [place] [address]);  Service: Colon and Rectal;  Laterality: N/A;    INSERTION OF TUNNELED CENTRAL VENOUS CATHETER (CVC) WITH SUBCUTANEOUS PORT N/A 5/3/2021    Procedure: [id number];  Surgeon: Francisco Layton MD;  Location: [facility] OR [address] [place] [address];  Service: Vascular;  Laterality: N/A;       Family History: No family history on file.    Social History:  reports that he has never smoked. He has never used smokeless tobacco. He reports current alcohol use.    Allergies:  Review of patient's " allergies indicates:  No Known Allergies    Medications:  Current Outpatient Medications   Medication Sig Dispense Refill    amLODIPine (NORVASC) 10 MG tablet Take 1 tablet (10 mg total) by mouth once daily. 30 tablet 3    LIDOcaine-prilocaine (EMLA) cream Apply topically as needed (Apply one hour before treatment). 30 g 5    acetaminophen (TYLENOL) 500 MG tablet Take 1 tablet (500 mg total) by mouth every 6 (six) hours as needed for Pain (alternate with ibuprofen). (Patient not taking: No sig reported)  0    ibuprofen (ADVIL,MOTRIN) 400 MG tablet Take 1 tablet (400 mg total) by mouth every 6 (six) hours as needed for Other (pain). Alternate with tylenol (Patient not taking: No sig reported)      ondansetron (ZOFRAN) 4 MG tablet Take 1 tablet (4 mg total) by mouth every 8 (eight) hours as needed for Nausea. (Patient not taking: No sig reported) 30 tablet 3     Current Facility-Administered Medications   Medication Dose Route Frequency Provider Last Rate Last Admin    sars-cov-2 (covid-19) 30 mcg/0.3 ml injection 0.3 mL  0.3 mL Intramuscular 1 time in Clinic/HOD Idania Rock LPN           Review of Systems   Constitutional: Negative for appetite change, fatigue, fever and unexpected weight change.   HENT: Negative for congestion.    Eyes: Negative for pain and visual disturbance.   Respiratory: Negative for cough, chest tightness, shortness of breath and wheezing.    Cardiovascular: Negative for chest pain, palpitations and leg swelling.   Gastrointestinal: Negative for abdominal pain, blood in stool, constipation, diarrhea, nausea and vomiting.   Genitourinary: Negative for difficulty urinating, flank pain, frequency, hematuria and urgency.   Musculoskeletal: Negative for arthralgias, back pain and myalgias.   Neurological: Negative for dizziness, weakness, numbness and headaches.   Psychiatric/Behavioral: The patient is not nervous/anxious.        ECOG Performance Status: 1   Objective:      Vitals:  "  Vitals:    12/30/21 0931   BP: 139/79   BP Location: Left arm   Patient Position: Sitting   BP Method: Medium (Automatic)   Pulse: 62   Resp: 20   SpO2: 99%   Weight: 61.7 kg (136 lb 0.4 oz)   Height: 5' 7" (1.702 m)     Physical Exam  Constitutional:       General: He is not in acute distress.     Appearance: Normal appearance.   HENT:      Head: Normocephalic and atraumatic.   Eyes:      Pupils: Pupils are equal, round, and reactive to light.   Cardiovascular:      Rate and Rhythm: Normal rate and regular rhythm.      Heart sounds: No murmur heard.      Pulmonary:      Effort: No respiratory distress.      Breath sounds: Normal breath sounds. No wheezing.   Abdominal:      General: Abdomen is flat. Bowel sounds are normal. There is no distension.      Palpations: Abdomen is soft. There is no mass.      Tenderness: There is no abdominal tenderness.   Musculoskeletal:         General: No swelling or deformity.   Skin:     Coloration: Skin is not jaundiced.   Neurological:      General: No focal deficit present.      Mental Status: He is alert and oriented to person, place, and time. Mental status is at baseline.         Laboratory Data:  Lab Visit on 12/30/2021   Component Date Value Ref Range Status    WBC 12/30/2021 7.66  3.90 - 12.70 K/uL Final    RBC 12/30/2021 4.65  4.60 - 6.20 M/uL Final    Hemoglobin 12/30/2021 13.6* 14.0 - 18.0 g/dL Final    Hematocrit 12/30/2021 43.8  40.0 - 54.0 % Final    MCV 12/30/2021 94  82 - 98 fL Final    MCH 12/30/2021 29.2  27.0 - 31.0 pg Final    MCHC 12/30/2021 31.1* 32.0 - 36.0 g/dL Final    RDW 12/30/2021 14.6* 11.5 - 14.5 % Final    Platelets 12/30/2021 255  150 - 450 K/uL Final    MPV 12/30/2021 10.2  9.2 - 12.9 fL Final    Immature Granulocytes 12/30/2021 0.1  0.0 - 0.5 % Final    Gran # (ANC) 12/30/2021 3.7  1.8 - 7.7 K/uL Final    Immature Grans (Abs) 12/30/2021 0.01  0.00 - 0.04 K/uL Final    Comment: Mild elevation in immature granulocytes is non specific " and   can be seen in a variety of conditions including stress response,   acute inflammation, trauma and pregnancy. Correlation with other   laboratory and clinical findings is essential.      Lymph # 12/30/2021 2.9  1.0 - 4.8 K/uL Final    Mono # 12/30/2021 0.9  0.3 - 1.0 K/uL Final    Eos # 12/30/2021 0.2  0.0 - 0.5 K/uL Final    Baso # 12/30/2021 0.05  0.00 - 0.20 K/uL Final    nRBC 12/30/2021 0  0 /100 WBC Final    Gran % 12/30/2021 48.2  38.0 - 73.0 % Final    Lymph % 12/30/2021 37.2  18.0 - 48.0 % Final    Mono % 12/30/2021 11.1  4.0 - 15.0 % Final    Eosinophil % 12/30/2021 2.7  0.0 - 8.0 % Final    Basophil % 12/30/2021 0.7  0.0 - 1.9 % Final    Differential Method 12/30/2021 Automated   Final    Sodium 12/30/2021 138  136 - 145 mmol/L Final    Potassium 12/30/2021 4.1  3.5 - 5.1 mmol/L Final    Chloride 12/30/2021 103  95 - 110 mmol/L Final    CO2 12/30/2021 29  23 - 29 mmol/L Final    Glucose 12/30/2021 88  70 - 110 mg/dL Final    BUN 12/30/2021 10  8 - 23 mg/dL Final    Creatinine 12/30/2021 1.0  0.5 - 1.4 mg/dL Final    Calcium 12/30/2021 9.3  8.7 - 10.5 mg/dL Final    Total Protein 12/30/2021 7.4  6.0 - 8.4 g/dL Final    Albumin 12/30/2021 3.2* 3.5 - 5.2 g/dL Final    Total Bilirubin 12/30/2021 0.5  0.1 - 1.0 mg/dL Final    Comment: For infants and newborns, interpretation of results should be based  on gestational age, weight and in agreement with clinical  observations.    Premature Infant recommended reference ranges:  Up to 24 hours.............<8.0 mg/dL  Up to 48 hours............<12.0 mg/dL  3-5 days..................<15.0 mg/dL  6-29 days.................<15.0 mg/dL      Alkaline Phosphatase 12/30/2021 79  55 - 135 U/L Final    AST 12/30/2021 21  10 - 40 U/L Final    ALT 12/30/2021 14  10 - 44 U/L Final    Anion Gap 12/30/2021 6* 8 - 16 mmol/L Final    eGFR if African American 12/30/2021 >60.0  >60 mL/min/1.73 m^2 Final    eGFR if non African American 12/30/2021 >60.0   >60 mL/min/1.73 m^2 Final    Comment: Calculation used to obtain the estimated glomerular filtration  rate (eGFR) is the CKD-EPI equation.      Hospital Outpatient Visit on 12/28/2021   Component Date Value Ref Range Status    POC Creatinine 12/28/2021 1.1  0.5 - 1.4 mg/dL Final    Sample 12/28/2021 VENOUS   Final       CT abdomen pelvis:  Three hypodense heterogeneous liver masses are detected.  The largest is centered in the right hepatic lobe and measures 11.1 x 9.9 x 9.8 cm.  There are also several low attenuation subcapsular lesions measuring up to 4.3 cm at the dome of the liver (601:67) which could be due to metastatic disease, subcapsular hematoma, or less likely subcapsular infectious process.  The gallbladder is nondistended.  The portosplenic confluence is distended with a large filling defect which could reflect bland thrombus or tumor thrombus measuring 2.2 cm.  This extends into the superior mesenteric vein which is also distended.     The pancreas, spleen, and adrenal glands have a normal appearance.     The kidneys demonstrate normal cortical thickness without hydronephrosis.     There is a soft tissue mass in the transverse colon measuring 4.5 x 4.2 x 3.6 cm.  This is highly suspicious for adenocarcinoma and results in at least high-grade partial colonic obstruction with dilated proximal colon as well as dilated small bowel loops.  Adjacent soft tissue nodules are noted in the mesentery in this region likely reflecting local pily metastases and or mesenteric implants.  The largest discrete     Areas of wall thickening are noted in the distal small bowel and cecum which could be related to venous obstruction at the level of the superior mesenteric vein thrombus.  Scattered colonic diverticula are present without diverticulitis.  There is scattered mild ascites.     The bladder is nondistended and not well evaluated.  The prostate appears mildly enlarged.     No osseous abnormalities  identified.     CT chest abdomen pelvis 07/22  Impression:     1. Significant decrease in size of previously identified transverse colon obstructing mass.  The dominant hepatic lesion and presumed portosplenic thrombus have also decreased in size suggesting favorable sponsored therapy.  However, there is an indeterminate left hepatic dome lesion which appears more conspicuous.  2. Interval placement of a transverse colon stent with resolution of prior bowel obstruction.  3. Sigmoid diverticulosis with diffusely thickened appearance of the proximal sigmoid, possibly related to incomplete distension.  No surrounding findings of diverticulitis.  Correlation with colonoscopy as clinically warranted.    CT C/A/P 10/05/2021  There is decrease in size of right hepatic lobe lesion with cyst stable size of left hepatic lobe lesion.  Additional stable subcentimeter hypodensities with no evidence of any lesions.     Continued decreased size of presumed thrombus at the portal splenic confluence.     Stent remains in the transverse colon with continued decreased conspicuity of previously described mass.    CT CAP 12/28/21  Patient with known metastatic colon cancer.  Hepatic lesions appears similar, measurements as above.      Assessment and Plan        1. Colon cancer metastasized to liver    2. Hypertension, unspecified type      1.  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 VIRI couldn't be sent due to insufficient sample. Guardant 360 sent was negative for BRAF, VIRI, HER2 amplification.   Pretreatment CEA 57.  We had a long and sonia discussion with him about his diagnosis. Unfortunately, the disease is not curable but remains treatable and he has good performance status.   Restaging scans after 7 cycles showed significant reduction in colonic mass and liver mass.   CEA 57.8 (pretreatment) -> Today 2.2  we switched to maintenance as of cycle 8 with 5FU + Pema  Restaging scans from December  2021 show stable disease.     Labs reviewed from today, proceed with cycle 18  Restaging scans in three months.  (03/2022)        2  Controlled       Follow-up:   CBC,CMP and chemo chair for 5FU+Pema infusion in two weeks   See me in four weeks with CBC,CMP,CEA and chemo chair for  5FU+Pema infusion      The above is discussed and staffed with Dr. Hernan Contreras MD  [id number]  Hematology/Oncology fellow

## 2021-12-30 ENCOUNTER — INFUSION (OUTPATIENT)
Dept: INFUSION THERAPY | Facility: HOSPITAL | Age: 71
End: 2021-12-30
Payer: MEDICARE

## 2021-12-30 ENCOUNTER — OFFICE VISIT (OUTPATIENT)
Dept: HEMATOLOGY/ONCOLOGY | Facility: CLINIC | Age: 71
End: 2021-12-30
Payer: MEDICARE

## 2021-12-30 VITALS
HEIGHT: 67 IN | OXYGEN SATURATION: 99 % | RESPIRATION RATE: 20 BRPM | WEIGHT: 136 LBS | BODY MASS INDEX: 21.35 KG/M2 | DIASTOLIC BLOOD PRESSURE: 79 MMHG | HEART RATE: 62 BPM | SYSTOLIC BLOOD PRESSURE: 139 MMHG

## 2021-12-30 VITALS
DIASTOLIC BLOOD PRESSURE: 78 MMHG | SYSTOLIC BLOOD PRESSURE: 135 MMHG | TEMPERATURE: 98 F | RESPIRATION RATE: 18 BRPM | HEART RATE: 58 BPM

## 2021-12-30 DIAGNOSIS — C78.7 COLON CANCER METASTASIZED TO LIVER: Primary | ICD-10-CM

## 2021-12-30 DIAGNOSIS — C78.7 METASTATIC COLON CANCER TO LIVER: Primary | ICD-10-CM

## 2021-12-30 DIAGNOSIS — I10 HYPERTENSION, UNSPECIFIED TYPE: ICD-10-CM

## 2021-12-30 DIAGNOSIS — C18.9 COLON CANCER METASTASIZED TO LIVER: Primary | ICD-10-CM

## 2021-12-30 DIAGNOSIS — C18.9 METASTATIC COLON CANCER TO LIVER: Primary | ICD-10-CM

## 2021-12-30 LAB
BILIRUB UR QL STRIP: NEGATIVE
CLARITY UR REFRACT.AUTO: CLEAR
COLOR UR AUTO: YELLOW
GLUCOSE UR QL STRIP: NEGATIVE
HGB UR QL STRIP: NEGATIVE
KETONES UR QL STRIP: NEGATIVE
LEUKOCYTE ESTERASE UR QL STRIP: NEGATIVE
NITRITE UR QL STRIP: NEGATIVE
PH UR STRIP: 5 [PH] (ref 5–8)
PROT UR QL STRIP: NEGATIVE
SP GR UR STRIP: 1.02 (ref 1–1.03)
URN SPEC COLLECT METH UR: NORMAL

## 2021-12-30 PROCEDURE — 96413 CHEMO IV INFUSION 1 HR: CPT

## 2021-12-30 PROCEDURE — 25000003 PHARM REV CODE 250: Performed by: INTERNAL MEDICINE

## 2021-12-30 PROCEDURE — 99999 PR PBB SHADOW E&M-EST. PATIENT-LVL III: CPT | Mod: PBBFAC,GC,, | Performed by: STUDENT IN AN ORGANIZED HEALTH CARE EDUCATION/TRAINING PROGRAM

## 2021-12-30 PROCEDURE — 81003 URINALYSIS AUTO W/O SCOPE: CPT | Performed by: STUDENT IN AN ORGANIZED HEALTH CARE EDUCATION/TRAINING PROGRAM

## 2021-12-30 PROCEDURE — 99214 OFFICE O/P EST MOD 30 MIN: CPT | Mod: S$PBB,GC,, | Performed by: STUDENT IN AN ORGANIZED HEALTH CARE EDUCATION/TRAINING PROGRAM

## 2021-12-30 PROCEDURE — 96416 CHEMO PROLONG INFUSE W/PUMP: CPT

## 2021-12-30 PROCEDURE — 99214 PR OFFICE/OUTPT VISIT, EST, LEVL IV, 30-39 MIN: ICD-10-PCS | Mod: S$PBB,GC,, | Performed by: STUDENT IN AN ORGANIZED HEALTH CARE EDUCATION/TRAINING PROGRAM

## 2021-12-30 PROCEDURE — 96375 TX/PRO/DX INJ NEW DRUG ADDON: CPT

## 2021-12-30 PROCEDURE — 99213 OFFICE O/P EST LOW 20 MIN: CPT | Mod: PBBFAC,25 | Performed by: STUDENT IN AN ORGANIZED HEALTH CARE EDUCATION/TRAINING PROGRAM

## 2021-12-30 PROCEDURE — 99999 PR PBB SHADOW E&M-EST. PATIENT-LVL III: ICD-10-PCS | Mod: PBBFAC,GC,, | Performed by: STUDENT IN AN ORGANIZED HEALTH CARE EDUCATION/TRAINING PROGRAM

## 2021-12-30 PROCEDURE — 63600175 PHARM REV CODE 636 W HCPCS: Mod: JG | Performed by: INTERNAL MEDICINE

## 2021-12-30 RX ORDER — EPINEPHRINE 0.3 MG/.3ML
0.3 INJECTION SUBCUTANEOUS ONCE AS NEEDED
Status: DISCONTINUED | OUTPATIENT
Start: 2021-12-30 | End: 2021-12-30 | Stop reason: HOSPADM

## 2021-12-30 RX ORDER — HEPARIN 100 UNIT/ML
500 SYRINGE INTRAVENOUS
Status: DISCONTINUED | OUTPATIENT
Start: 2021-12-30 | End: 2021-12-30 | Stop reason: HOSPADM

## 2021-12-30 RX ORDER — HEPARIN 100 UNIT/ML
500 SYRINGE INTRAVENOUS
Status: CANCELLED | OUTPATIENT
Start: 2022-01-01

## 2021-12-30 RX ORDER — ONDANSETRON 2 MG/ML
8 INJECTION INTRAMUSCULAR; INTRAVENOUS
Status: COMPLETED | OUTPATIENT
Start: 2021-12-30 | End: 2021-12-30

## 2021-12-30 RX ORDER — ONDANSETRON 2 MG/ML
8 INJECTION INTRAMUSCULAR; INTRAVENOUS
Status: CANCELLED | OUTPATIENT
Start: 2021-12-30

## 2021-12-30 RX ORDER — SODIUM CHLORIDE 0.9 % (FLUSH) 0.9 %
10 SYRINGE (ML) INJECTION
Status: CANCELLED | OUTPATIENT
Start: 2022-01-01

## 2021-12-30 RX ORDER — HEPARIN 100 UNIT/ML
500 SYRINGE INTRAVENOUS
Status: CANCELLED | OUTPATIENT
Start: 2021-12-30

## 2021-12-30 RX ORDER — SODIUM CHLORIDE 0.9 % (FLUSH) 0.9 %
10 SYRINGE (ML) INJECTION
Status: CANCELLED | OUTPATIENT
Start: 2021-12-30

## 2021-12-30 RX ORDER — DIPHENHYDRAMINE HYDROCHLORIDE 50 MG/ML
50 INJECTION INTRAMUSCULAR; INTRAVENOUS ONCE AS NEEDED
Status: CANCELLED | OUTPATIENT
Start: 2021-12-30

## 2021-12-30 RX ORDER — SODIUM CHLORIDE 0.9 % (FLUSH) 0.9 %
10 SYRINGE (ML) INJECTION
Status: DISCONTINUED | OUTPATIENT
Start: 2021-12-30 | End: 2021-12-30 | Stop reason: HOSPADM

## 2021-12-30 RX ORDER — EPINEPHRINE 0.3 MG/.3ML
0.3 INJECTION SUBCUTANEOUS ONCE AS NEEDED
Status: CANCELLED | OUTPATIENT
Start: 2021-12-30

## 2021-12-30 RX ORDER — DIPHENHYDRAMINE HYDROCHLORIDE 50 MG/ML
50 INJECTION INTRAMUSCULAR; INTRAVENOUS ONCE AS NEEDED
Status: DISCONTINUED | OUTPATIENT
Start: 2021-12-30 | End: 2021-12-30 | Stop reason: HOSPADM

## 2021-12-30 RX ADMIN — ONDANSETRON 8 MG: 2 INJECTION, SOLUTION INTRAMUSCULAR; INTRAVENOUS at 10:12

## 2021-12-30 RX ADMIN — SODIUM CHLORIDE: 0.9 INJECTION, SOLUTION INTRAVENOUS at 10:12

## 2021-12-30 RX ADMIN — FLUOROURACIL 4000 MG: 50 INJECTION, SOLUTION INTRAVENOUS at 11:12

## 2021-12-30 RX ADMIN — BEVACIZUMAB 300 MG: 100 INJECTION, SOLUTION INTRAVENOUS at 11:12

## 2021-12-30 NOTE — Clinical Note
CBC,CMP and chemo chair for 5FU+Pema infusion in two weeks (01/13), pump disconnect on 01/15 See me in four weeks (01/27) with CBC,CMP, Urinalysis, CEA and chemo chair for  5FU+Pema infusion. Pump disconnect on 01/29 Thanks

## 2021-12-31 ENCOUNTER — LAB VISIT (OUTPATIENT)
Dept: PRIMARY CARE CLINIC | Facility: OTHER | Age: 71
End: 2021-12-31
Attending: INTERNAL MEDICINE
Payer: MEDICARE

## 2021-12-31 DIAGNOSIS — Z20.822 ENCOUNTER FOR LABORATORY TESTING FOR COVID-19 VIRUS: ICD-10-CM

## 2021-12-31 PROCEDURE — U0003 INFECTIOUS AGENT DETECTION BY NUCLEIC ACID (DNA OR RNA); SEVERE ACUTE RESPIRATORY SYNDROME CORONAVIRUS 2 (SARS-COV-2) (CORONAVIRUS DISEASE [COVID-19]), AMPLIFIED PROBE TECHNIQUE, MAKING USE OF HIGH THROUGHPUT TECHNOLOGIES AS DESCRIBED BY CMS-2020-01-R: HCPCS | Mod: ST72 | Performed by: INTERNAL MEDICINE

## 2022-01-03 ENCOUNTER — INFUSION (OUTPATIENT)
Dept: INFUSION THERAPY | Facility: HOSPITAL | Age: 72
End: 2022-01-03
Payer: MEDICARE

## 2022-01-03 VITALS
RESPIRATION RATE: 17 BRPM | HEART RATE: 92 BPM | TEMPERATURE: 99 F | OXYGEN SATURATION: 98 % | DIASTOLIC BLOOD PRESSURE: 79 MMHG | SYSTOLIC BLOOD PRESSURE: 112 MMHG

## 2022-01-03 DIAGNOSIS — C78.7 METASTATIC COLON CANCER TO LIVER: Primary | ICD-10-CM

## 2022-01-03 DIAGNOSIS — C18.9 METASTATIC COLON CANCER TO LIVER: Primary | ICD-10-CM

## 2022-01-03 PROCEDURE — A4216 STERILE WATER/SALINE, 10 ML: HCPCS | Performed by: INTERNAL MEDICINE

## 2022-01-03 PROCEDURE — 63600175 PHARM REV CODE 636 W HCPCS: Performed by: INTERNAL MEDICINE

## 2022-01-03 PROCEDURE — 99211 OFF/OP EST MAY X REQ PHY/QHP: CPT

## 2022-01-03 PROCEDURE — 25000003 PHARM REV CODE 250: Performed by: INTERNAL MEDICINE

## 2022-01-03 RX ORDER — SODIUM CHLORIDE 0.9 % (FLUSH) 0.9 %
10 SYRINGE (ML) INJECTION
Status: DISCONTINUED | OUTPATIENT
Start: 2022-01-03 | End: 2022-01-03 | Stop reason: HOSPADM

## 2022-01-03 RX ORDER — HEPARIN 100 UNIT/ML
500 SYRINGE INTRAVENOUS
Status: DISCONTINUED | OUTPATIENT
Start: 2022-01-03 | End: 2022-01-03 | Stop reason: HOSPADM

## 2022-01-03 RX ADMIN — Medication 10 ML: at 02:01

## 2022-01-03 RX ADMIN — HEPARIN 500 UNITS: 100 SYRINGE at 02:01

## 2022-01-03 NOTE — PLAN OF CARE
Pt ambulatory to clinic today with daughter for pump d/c. Pt denies any sig complaints. Port deaccessed after flushing. Pt ambulatory from clinic in [facility].

## 2022-01-05 LAB
SARS-COV-2 RNA RESP QL NAA+PROBE: NOT DETECTED
SARS-COV-2- CYCLE NUMBER: NORMAL

## 2022-01-13 ENCOUNTER — LAB VISIT (OUTPATIENT)
Dept: LAB | Facility: HOSPITAL | Age: 72
End: 2022-01-13
Payer: MEDICARE

## 2022-01-13 ENCOUNTER — INFUSION (OUTPATIENT)
Dept: INFUSION THERAPY | Facility: HOSPITAL | Age: 72
End: 2022-01-13
Payer: MEDICARE

## 2022-01-13 VITALS
RESPIRATION RATE: 18 BRPM | DIASTOLIC BLOOD PRESSURE: 79 MMHG | SYSTOLIC BLOOD PRESSURE: 117 MMHG | HEIGHT: 67 IN | WEIGHT: 136 LBS | BODY MASS INDEX: 21.35 KG/M2 | HEART RATE: 71 BPM | TEMPERATURE: 98 F

## 2022-01-13 DIAGNOSIS — C18.9 METASTATIC COLON CANCER TO LIVER: ICD-10-CM

## 2022-01-13 DIAGNOSIS — C78.7 METASTATIC COLON CANCER TO LIVER: Primary | ICD-10-CM

## 2022-01-13 DIAGNOSIS — C18.9 METASTATIC COLON CANCER TO LIVER: Primary | ICD-10-CM

## 2022-01-13 DIAGNOSIS — C78.7 METASTATIC COLON CANCER TO LIVER: ICD-10-CM

## 2022-01-13 LAB
ALBUMIN SERPL BCP-MCNC: 3.4 G/DL (ref 3.5–5.2)
ALP SERPL-CCNC: 88 U/L (ref 55–135)
ALT SERPL W/O P-5'-P-CCNC: 19 U/L (ref 10–44)
ANION GAP SERPL CALC-SCNC: 8 MMOL/L (ref 8–16)
AST SERPL-CCNC: 24 U/L (ref 10–40)
BILIRUB SERPL-MCNC: 0.7 MG/DL (ref 0.1–1)
BUN SERPL-MCNC: 14 MG/DL (ref 8–23)
CALCIUM SERPL-MCNC: 9 MG/DL (ref 8.7–10.5)
CHLORIDE SERPL-SCNC: 104 MMOL/L (ref 95–110)
CO2 SERPL-SCNC: 27 MMOL/L (ref 23–29)
CREAT SERPL-MCNC: 1 MG/DL (ref 0.5–1.4)
ERYTHROCYTE [DISTWIDTH] IN BLOOD BY AUTOMATED COUNT: 14.6 % (ref 11.5–14.5)
EST. GFR  (AFRICAN AMERICAN): >60 ML/MIN/1.73 M^2
EST. GFR  (NON AFRICAN AMERICAN): >60 ML/MIN/1.73 M^2
GLUCOSE SERPL-MCNC: 111 MG/DL (ref 70–110)
HCT VFR BLD AUTO: 45 % (ref 40–54)
HGB BLD-MCNC: 14 G/DL (ref 14–18)
IMM GRANULOCYTES # BLD AUTO: 0.02 K/UL (ref 0–0.04)
MCH RBC QN AUTO: 29.5 PG (ref 27–31)
MCHC RBC AUTO-ENTMCNC: 31.1 G/DL (ref 32–36)
MCV RBC AUTO: 95 FL (ref 82–98)
NEUTROPHILS # BLD AUTO: 4.8 K/UL (ref 1.8–7.7)
PLATELET # BLD AUTO: 276 K/UL (ref 150–450)
PMV BLD AUTO: 10.3 FL (ref 9.2–12.9)
POTASSIUM SERPL-SCNC: 4.3 MMOL/L (ref 3.5–5.1)
PROT SERPL-MCNC: 7 G/DL (ref 6–8.4)
RBC # BLD AUTO: 4.74 M/UL (ref 4.6–6.2)
SODIUM SERPL-SCNC: 139 MMOL/L (ref 136–145)
WBC # BLD AUTO: 8.9 K/UL (ref 3.9–12.7)

## 2022-01-13 PROCEDURE — 36415 COLL VENOUS BLD VENIPUNCTURE: CPT | Performed by: STUDENT IN AN ORGANIZED HEALTH CARE EDUCATION/TRAINING PROGRAM

## 2022-01-13 PROCEDURE — 85027 COMPLETE CBC AUTOMATED: CPT | Performed by: STUDENT IN AN ORGANIZED HEALTH CARE EDUCATION/TRAINING PROGRAM

## 2022-01-13 PROCEDURE — 25000003 PHARM REV CODE 250: Performed by: INTERNAL MEDICINE

## 2022-01-13 PROCEDURE — 96376 TX/PRO/DX INJ SAME DRUG ADON: CPT

## 2022-01-13 PROCEDURE — 96413 CHEMO IV INFUSION 1 HR: CPT

## 2022-01-13 PROCEDURE — 63600175 PHARM REV CODE 636 W HCPCS: Mod: JG | Performed by: INTERNAL MEDICINE

## 2022-01-13 PROCEDURE — 80053 COMPREHEN METABOLIC PANEL: CPT | Performed by: STUDENT IN AN ORGANIZED HEALTH CARE EDUCATION/TRAINING PROGRAM

## 2022-01-13 PROCEDURE — 96416 CHEMO PROLONG INFUSE W/PUMP: CPT

## 2022-01-13 RX ORDER — DIPHENHYDRAMINE HYDROCHLORIDE 50 MG/ML
50 INJECTION INTRAMUSCULAR; INTRAVENOUS ONCE AS NEEDED
Status: DISCONTINUED | OUTPATIENT
Start: 2022-01-13 | End: 2022-01-13 | Stop reason: HOSPADM

## 2022-01-13 RX ORDER — EPINEPHRINE 0.3 MG/.3ML
0.3 INJECTION SUBCUTANEOUS ONCE AS NEEDED
Status: DISCONTINUED | OUTPATIENT
Start: 2022-01-13 | End: 2022-01-13 | Stop reason: HOSPADM

## 2022-01-13 RX ORDER — SODIUM CHLORIDE 0.9 % (FLUSH) 0.9 %
10 SYRINGE (ML) INJECTION
Status: DISCONTINUED | OUTPATIENT
Start: 2022-01-13 | End: 2022-01-13 | Stop reason: HOSPADM

## 2022-01-13 RX ORDER — SODIUM CHLORIDE 0.9 % (FLUSH) 0.9 %
10 SYRINGE (ML) INJECTION
Status: CANCELLED | OUTPATIENT
Start: 2022-01-15

## 2022-01-13 RX ORDER — HEPARIN 100 UNIT/ML
500 SYRINGE INTRAVENOUS
Status: DISCONTINUED | OUTPATIENT
Start: 2022-01-13 | End: 2022-01-13 | Stop reason: HOSPADM

## 2022-01-13 RX ORDER — ONDANSETRON 2 MG/ML
8 INJECTION INTRAMUSCULAR; INTRAVENOUS
Status: COMPLETED | OUTPATIENT
Start: 2022-01-13 | End: 2022-01-13

## 2022-01-13 RX ORDER — HEPARIN 100 UNIT/ML
500 SYRINGE INTRAVENOUS
Status: CANCELLED | OUTPATIENT
Start: 2022-01-15

## 2022-01-13 RX ADMIN — BEVACIZUMAB 300 MG: 100 INJECTION, SOLUTION INTRAVENOUS at 11:01

## 2022-01-13 RX ADMIN — FLUOROURACIL 4000 MG: 50 INJECTION, SOLUTION INTRAVENOUS at 11:01

## 2022-01-13 RX ADMIN — ONDANSETRON 8 MG: 2 INJECTION, SOLUTION INTRAMUSCULAR; INTRAVENOUS at 10:01

## 2022-01-15 ENCOUNTER — INFUSION (OUTPATIENT)
Dept: INFUSION THERAPY | Facility: HOSPITAL | Age: 72
End: 2022-01-15
Attending: STUDENT IN AN ORGANIZED HEALTH CARE EDUCATION/TRAINING PROGRAM
Payer: MEDICARE

## 2022-01-15 VITALS
DIASTOLIC BLOOD PRESSURE: 84 MMHG | HEART RATE: 63 BPM | HEIGHT: 67 IN | WEIGHT: 136 LBS | SYSTOLIC BLOOD PRESSURE: 156 MMHG | BODY MASS INDEX: 21.35 KG/M2 | RESPIRATION RATE: 18 BRPM

## 2022-01-15 DIAGNOSIS — C78.7 METASTATIC COLON CANCER TO LIVER: Primary | ICD-10-CM

## 2022-01-15 DIAGNOSIS — C18.9 METASTATIC COLON CANCER TO LIVER: Primary | ICD-10-CM

## 2022-01-15 PROCEDURE — A4216 STERILE WATER/SALINE, 10 ML: HCPCS | Performed by: INTERNAL MEDICINE

## 2022-01-15 PROCEDURE — 99211 OFF/OP EST MAY X REQ PHY/QHP: CPT

## 2022-01-15 PROCEDURE — 63600175 PHARM REV CODE 636 W HCPCS: Performed by: INTERNAL MEDICINE

## 2022-01-15 PROCEDURE — 25000003 PHARM REV CODE 250: Performed by: INTERNAL MEDICINE

## 2022-01-15 RX ORDER — SODIUM CHLORIDE 0.9 % (FLUSH) 0.9 %
10 SYRINGE (ML) INJECTION
Status: DISCONTINUED | OUTPATIENT
Start: 2022-01-15 | End: 2022-01-15 | Stop reason: HOSPADM

## 2022-01-15 RX ORDER — HEPARIN 100 UNIT/ML
500 SYRINGE INTRAVENOUS
Status: DISCONTINUED | OUTPATIENT
Start: 2022-01-15 | End: 2022-01-15 | Stop reason: HOSPADM

## 2022-01-15 RX ADMIN — HEPARIN 500 UNITS: 100 SYRINGE at 10:01

## 2022-01-15 RX ADMIN — Medication 10 ML: at 10:01

## 2022-01-15 NOTE — NURSING
1014 pt here for pump d/c, infusion complete, port flushed per policy and deaccessed without issue, no distress noted upon d/c to home

## 2022-01-26 NOTE — PROGRESS NOTES
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     Initial History:  Mr. Downs is a 71 y.o. male who returns following hospital discharge.  70 years old pleasant AA gentleman, with history of hypertension, presenting with two weeks duration of abdominal cramps, diarrhea, nausea and vomiting. His symptoms started gradually with intermittent generalize crampy abdominal pain, worse with food. That was associated with nausea, reflux and occaisonal vomiting. He tried pepto-bismol and imodium with no relief, and hence he presented to ER.   He lost significant amount of weight, but cannot quantify the amount or the time period. He has also been feeling weaker than usual.   He hasn't seen a healthcare provider in over than 10 years. He has never had a colonoscopy.   In the ER. His labs were significant of microcytic anemia of 9.7 g/dl, MCV 77, and Platelets counts of 495. CT of the abdomen and pelvis showed " Large mass in the transverse colon highly suspicious for adenocarcinoma resulting in at least high-grade partial obstruction with dilation of proximal colon and small bowel. Soft tissue nodules in the adjacent mesentery are suspicious for pily metastases and/or mesenteric implants.  Numerous hepatic masses measuring up to 11.2 cm most likely related to metastatic disease.  There also several small subcapsular lesions which could reflect additional metastatic disease"   CT chest was negative to " metastatic disease.   He underwent colonoscopy and stent placement. 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 Pathology from colonic mass showed moderately differentiated adenocarcinoma, pMMR. HER 2 negative, VIRI/SHERI NGS was cancelled due to insufficient quantity      He started palliative chemotherapy with FOLFOX+Pema     Restaging scans on 07/22/2021 showed significant decrease in size trasverse colon mass as well liver metastatic lesion.   He was switched to maintenance 5FU+Pema from C8  Restaging scans from 10/05/2021 (after C11) showing stable-mildly improved liver mets.   Restaging scans 12/28/2021 with stable disease.     Interval History:   He presents today with his daughter prior to cycle 20  He is tolerating treatment with no issues. He is feeling overall well. He denies nausea, vomiting, constipation. He has good appetite.      Past Medical History:   Past Medical History:   Diagnosis Date    Hypertension     Metastatic colon cancer to liver 4/22/2021       Past Surgical HIstory:   Past Surgical History:   Procedure Laterality Date    COLONOSCOPY N/A 4/20/2021    Procedure: COLONOSCOPY with possible stent;  Surgeon: EDILMA Bonilla MD;  Location: [facility] ([address] [place] [address]);  Service: Colon and Rectal;  Laterality: N/A;    INSERTION OF TUNNELED CENTRAL VENOUS CATHETER (CVC) WITH SUBCUTANEOUS PORT N/A 5/3/2021    Procedure: [id number];  Surgeon: Francisco Layton MD;  Location: [facility] OR [address] [place] [address];  Service: Vascular;  Laterality: N/A;       Family History: No family history on file.    Social History:  reports that he has never smoked. He has never used smokeless tobacco. He reports current alcohol use.    Allergies:  Review of patient's allergies indicates:  No Known Allergies    Medications:  Current Outpatient Medications   Medication Sig Dispense Refill    acetaminophen (TYLENOL) 500 MG tablet Take 1 tablet (500 mg total) by mouth  every 6 (six) hours as needed for Pain (alternate with ibuprofen). (Patient not taking: No sig reported)  0    amLODIPine (NORVASC) 10 MG tablet Take 1 tablet (10 mg total) by mouth once daily. 30 tablet 3    ibuprofen (ADVIL,MOTRIN) 400 MG tablet Take 1 tablet (400 mg total) by mouth every 6 (six) hours as needed for Other (pain). Alternate with tylenol (Patient not taking: No sig reported)      LIDOcaine-prilocaine (EMLA) cream Apply topically as needed (Apply one hour before treatment). 30 g 5    ondansetron (ZOFRAN) 4 MG tablet Take 1 tablet (4 mg total) by mouth every 8 (eight) hours as needed for Nausea. (Patient not taking: No sig reported) 30 tablet 3     Current Facility-Administered Medications   Medication Dose Route Frequency Provider Last Rate Last Admin    sars-cov-2 (covid-19) 30 mcg/0.3 ml injection 0.3 mL  0.3 mL Intramuscular 1 time in Clinic/HOD Idania Rock LPN           Review of Systems   Constitutional: Negative for appetite change, fatigue, fever and unexpected weight change.   HENT: Negative for congestion.    Eyes: Negative for pain and visual disturbance.   Respiratory: Negative for cough, chest tightness, shortness of breath and wheezing.    Cardiovascular: Negative for chest pain, palpitations and leg swelling.   Gastrointestinal: Negative for abdominal pain, blood in stool, constipation, diarrhea, nausea and vomiting.   Genitourinary: Negative for difficulty urinating, flank pain, frequency, hematuria and urgency.   Musculoskeletal: Negative for arthralgias, back pain and myalgias.   Neurological: Negative for dizziness, weakness, numbness and headaches.   Psychiatric/Behavioral: The patient is not nervous/anxious.        ECOG Performance Status: 1   Objective:      Vitals:   There were no vitals filed for this visit.  Physical Exam  Constitutional:       General: He is not in acute distress.     Appearance: Normal appearance.   HENT:      Head: Normocephalic and atraumatic.    Eyes:      Pupils: Pupils are equal, round, and reactive to light.   Cardiovascular:      Rate and Rhythm: Normal rate and regular rhythm.      Heart sounds: No murmur heard.      Pulmonary:      Effort: No respiratory distress.      Breath sounds: Normal breath sounds. No wheezing.   Abdominal:      General: Abdomen is flat. Bowel sounds are normal. There is no distension.      Palpations: Abdomen is soft. There is no mass.      Tenderness: There is no abdominal tenderness.   Musculoskeletal:         General: No swelling or deformity.   Skin:     Coloration: Skin is not jaundiced.   Neurological:      General: No focal deficit present.      Mental Status: He is alert and oriented to person, place, and time. Mental status is at baseline.         Laboratory Data:  No visits with results within 1 Week(s) from this visit.   Latest known visit with results is:   Lab Visit on 01/13/2022   Component Date Value Ref Range Status    WBC 01/13/2022 8.90  3.90 - 12.70 K/uL Final    RBC 01/13/2022 4.74  4.60 - 6.20 M/uL Final    Hemoglobin 01/13/2022 14.0  14.0 - 18.0 g/dL Final    Hematocrit 01/13/2022 45.0  40.0 - 54.0 % Final    MCV 01/13/2022 95  82 - 98 fL Final    MCH 01/13/2022 29.5  27.0 - 31.0 pg Final    MCHC 01/13/2022 31.1* 32.0 - 36.0 g/dL Final    RDW 01/13/2022 14.6* 11.5 - 14.5 % Final    Platelets 01/13/2022 276  150 - 450 K/uL Final    MPV 01/13/2022 10.3  9.2 - 12.9 fL Final    Gran # (ANC) 01/13/2022 4.8  1.8 - 7.7 K/uL Final    Comment: The ANC is based on a white cell differential from an   automated cell counter. It has not been microscopically   reviewed for the presence of abnormal cells. Clinical   correlation is required.      Immature Grans (Abs) 01/13/2022 0.02  0.00 - 0.04 K/uL Final    Comment: Mild elevation in immature granulocytes is non specific and   can be seen in a variety of conditions including stress response,   acute inflammation, trauma and pregnancy. Correlation with  other   laboratory and clinical findings is essential.      Sodium 01/13/2022 139  136 - 145 mmol/L Final    Potassium 01/13/2022 4.3  3.5 - 5.1 mmol/L Final    Chloride 01/13/2022 104  95 - 110 mmol/L Final    CO2 01/13/2022 27  23 - 29 mmol/L Final    Glucose 01/13/2022 111* 70 - 110 mg/dL Final    BUN 01/13/2022 14  8 - 23 mg/dL Final    Creatinine 01/13/2022 1.0  0.5 - 1.4 mg/dL Final    Calcium 01/13/2022 9.0  8.7 - 10.5 mg/dL Final    Total Protein 01/13/2022 7.0  6.0 - 8.4 g/dL Final    Albumin 01/13/2022 3.4* 3.5 - 5.2 g/dL Final    Total Bilirubin 01/13/2022 0.7  0.1 - 1.0 mg/dL Final    Comment: For infants and newborns, interpretation of results should be based  on gestational age, weight and in agreement with clinical  observations.    Premature Infant recommended reference ranges:  Up to 24 hours.............<8.0 mg/dL  Up to 48 hours............<12.0 mg/dL  3-5 days..................<15.0 mg/dL  6-29 days.................<15.0 mg/dL      Alkaline Phosphatase 01/13/2022 88  55 - 135 U/L Final    AST 01/13/2022 24  10 - 40 U/L Final    ALT 01/13/2022 19  10 - 44 U/L Final    Anion Gap 01/13/2022 8  8 - 16 mmol/L Final    eGFR if African American 01/13/2022 >60.0  >60 mL/min/1.73 m^2 Final    eGFR if non African American 01/13/2022 >60.0  >60 mL/min/1.73 m^2 Final    Comment: Calculation used to obtain the estimated glomerular filtration  rate (eGFR) is the CKD-EPI equation.        CT CAP 12/28/21  Patient with known metastatic colon cancer.  Hepatic lesions appears similar      Assessment and Plan        1. Colon cancer metastasized to liver    2. Hypertension, unspecified type      1.  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 VIRI couldn't be sent due to insufficient sample. Guardant 360 sent was negative for BRAF, VIRI, HER2 amplification.   Pretreatment CEA 57.  We had a long and sonia discussion with him about his diagnosis. Unfortunately, the  disease is not curable but remains treatable and he has good performance status.   Restaging scans after 7 cycles showed significant reduction in colonic mass and liver mass.   CEA 57.8 (pretreatment) -> Today is ***  we switched to maintenance as of cycle 8 with 5FU + Pema  Restaging scans from December 2021 show stable disease.     Labs reviewed from today, proceed with cycle 20  Restaging scans next month.  (02/2022)        2  Controlled       Follow-up:   CBC,CMP and chemo chair for 5FU+Pema infusion in two weeks   See me in four weeks with CBC,CMP,CEA and chemo chair for  5FU+Pema infusion      The above is discussed and staffed with Dr. Schuster

## 2022-01-27 ENCOUNTER — OFFICE VISIT (OUTPATIENT)
Dept: HEMATOLOGY/ONCOLOGY | Facility: CLINIC | Age: 72
End: 2022-01-27
Payer: MEDICARE

## 2022-01-27 ENCOUNTER — INFUSION (OUTPATIENT)
Dept: INFUSION THERAPY | Facility: HOSPITAL | Age: 72
End: 2022-01-27
Payer: MEDICARE

## 2022-01-27 VITALS
HEART RATE: 81 BPM | BODY MASS INDEX: 21.45 KG/M2 | HEIGHT: 67 IN | WEIGHT: 136.69 LBS | DIASTOLIC BLOOD PRESSURE: 69 MMHG | TEMPERATURE: 98 F | SYSTOLIC BLOOD PRESSURE: 122 MMHG

## 2022-01-27 VITALS
RESPIRATION RATE: 16 BRPM | SYSTOLIC BLOOD PRESSURE: 112 MMHG | HEART RATE: 65 BPM | OXYGEN SATURATION: 98 % | DIASTOLIC BLOOD PRESSURE: 64 MMHG | TEMPERATURE: 96 F

## 2022-01-27 DIAGNOSIS — C18.9 METASTATIC COLON CANCER TO LIVER: Primary | ICD-10-CM

## 2022-01-27 DIAGNOSIS — C18.9 COLON CANCER METASTASIZED TO LIVER: Primary | ICD-10-CM

## 2022-01-27 DIAGNOSIS — I10 HYPERTENSION, UNSPECIFIED TYPE: ICD-10-CM

## 2022-01-27 DIAGNOSIS — C78.7 COLON CANCER METASTASIZED TO LIVER: Primary | ICD-10-CM

## 2022-01-27 DIAGNOSIS — C78.7 METASTATIC COLON CANCER TO LIVER: Primary | ICD-10-CM

## 2022-01-27 PROCEDURE — 99215 PR OFFICE/OUTPT VISIT, EST, LEVL V, 40-54 MIN: ICD-10-PCS | Mod: S$PBB,GC,, | Performed by: STUDENT IN AN ORGANIZED HEALTH CARE EDUCATION/TRAINING PROGRAM

## 2022-01-27 PROCEDURE — 96413 CHEMO IV INFUSION 1 HR: CPT

## 2022-01-27 PROCEDURE — 99215 OFFICE O/P EST HI 40 MIN: CPT | Mod: S$PBB,GC,, | Performed by: STUDENT IN AN ORGANIZED HEALTH CARE EDUCATION/TRAINING PROGRAM

## 2022-01-27 PROCEDURE — 99999 PR PBB SHADOW E&M-EST. PATIENT-LVL III: CPT | Mod: PBBFAC,GC,, | Performed by: STUDENT IN AN ORGANIZED HEALTH CARE EDUCATION/TRAINING PROGRAM

## 2022-01-27 PROCEDURE — 96416 CHEMO PROLONG INFUSE W/PUMP: CPT

## 2022-01-27 PROCEDURE — 99999 PR PBB SHADOW E&M-EST. PATIENT-LVL III: ICD-10-PCS | Mod: PBBFAC,GC,, | Performed by: STUDENT IN AN ORGANIZED HEALTH CARE EDUCATION/TRAINING PROGRAM

## 2022-01-27 PROCEDURE — 63600175 PHARM REV CODE 636 W HCPCS: Performed by: INTERNAL MEDICINE

## 2022-01-27 PROCEDURE — 25000003 PHARM REV CODE 250: Performed by: INTERNAL MEDICINE

## 2022-01-27 PROCEDURE — 96375 TX/PRO/DX INJ NEW DRUG ADDON: CPT

## 2022-01-27 PROCEDURE — 99213 OFFICE O/P EST LOW 20 MIN: CPT | Mod: PBBFAC,25 | Performed by: STUDENT IN AN ORGANIZED HEALTH CARE EDUCATION/TRAINING PROGRAM

## 2022-01-27 RX ORDER — ONDANSETRON 2 MG/ML
8 INJECTION INTRAMUSCULAR; INTRAVENOUS
Status: CANCELLED | OUTPATIENT
Start: 2022-01-27

## 2022-01-27 RX ORDER — HEPARIN 100 UNIT/ML
500 SYRINGE INTRAVENOUS
Status: CANCELLED | OUTPATIENT
Start: 2022-01-29

## 2022-01-27 RX ORDER — SODIUM CHLORIDE 0.9 % (FLUSH) 0.9 %
10 SYRINGE (ML) INJECTION
Status: CANCELLED | OUTPATIENT
Start: 2022-01-27

## 2022-01-27 RX ORDER — EPINEPHRINE 0.3 MG/.3ML
0.3 INJECTION SUBCUTANEOUS ONCE AS NEEDED
Status: CANCELLED | OUTPATIENT
Start: 2022-01-27

## 2022-01-27 RX ORDER — EPINEPHRINE 0.3 MG/.3ML
0.3 INJECTION SUBCUTANEOUS ONCE AS NEEDED
Status: DISCONTINUED | OUTPATIENT
Start: 2022-01-27 | End: 2022-01-27 | Stop reason: HOSPADM

## 2022-01-27 RX ORDER — DIPHENHYDRAMINE HYDROCHLORIDE 50 MG/ML
50 INJECTION INTRAMUSCULAR; INTRAVENOUS ONCE AS NEEDED
Status: CANCELLED | OUTPATIENT
Start: 2022-01-27

## 2022-01-27 RX ORDER — DIPHENHYDRAMINE HYDROCHLORIDE 50 MG/ML
50 INJECTION INTRAMUSCULAR; INTRAVENOUS ONCE AS NEEDED
Status: DISCONTINUED | OUTPATIENT
Start: 2022-01-27 | End: 2022-01-27 | Stop reason: HOSPADM

## 2022-01-27 RX ORDER — HEPARIN 100 UNIT/ML
500 SYRINGE INTRAVENOUS
Status: CANCELLED | OUTPATIENT
Start: 2022-01-27

## 2022-01-27 RX ORDER — SODIUM CHLORIDE 0.9 % (FLUSH) 0.9 %
10 SYRINGE (ML) INJECTION
Status: CANCELLED | OUTPATIENT
Start: 2022-01-29

## 2022-01-27 RX ORDER — ONDANSETRON 2 MG/ML
8 INJECTION INTRAMUSCULAR; INTRAVENOUS
Status: COMPLETED | OUTPATIENT
Start: 2022-01-27 | End: 2022-01-27

## 2022-01-27 RX ADMIN — FLUOROURACIL 4000 MG: 50 INJECTION, SOLUTION INTRAVENOUS at 02:01

## 2022-01-27 RX ADMIN — ONDANSETRON 8 MG: 2 INJECTION, SOLUTION INTRAMUSCULAR; INTRAVENOUS at 12:01

## 2022-01-27 RX ADMIN — BEVACIZUMAB 300 MG: 100 INJECTION, SOLUTION INTRAVENOUS at 01:01

## 2022-01-27 RX ADMIN — SODIUM CHLORIDE: 0.9 INJECTION, SOLUTION INTRAVENOUS at 12:01

## 2022-01-27 NOTE — Clinical Note
CBC,CMP, UA, and chemo chair for 5FU+Pema infusion in two weeks  See me in four weeks with CBC,CMP,CEA and chemo chair for  5FU+Pema infusion

## 2022-01-27 NOTE — PROGRESS NOTES
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      Initial History:  Mr. Downs is a 71 y.o. male who returns following hospital discharge.  70 years old pleasant AA gentleman, with history of hypertension, presenting with two weeks duration of abdominal cramps, diarrhea, nausea and vomiting. His symptoms started gradually with intermittent generalize crampy abdominal pain, worse with food. That was associated with nausea, reflux and occaisonal vomiting. He tried pepto-bismol and imodium with no relief, and hence he presented to ER.   He lost significant amount of weight, but cannot quantify the amount or the time period. He has also been feeling weaker than usual.   He hasn't seen a healthcare provider in over than 10 years. He has never had a colonoscopy.   In the ER. His labs were significant of microcytic anemia of 9.7 g/dl, MCV 77, and Platelets counts of 495. CT of the abdomen and pelvis showed " Large mass in the transverse colon highly suspicious for adenocarcinoma resulting in at least high-grade partial obstruction with dilation of proximal colon and small bowel. Soft tissue nodules in the adjacent mesentery are suspicious for pily metastases and/or mesenteric implants.  Numerous hepatic masses measuring up to 11.2 cm most likely related to metastatic disease.  There also several small subcapsular lesions which could reflect additional metastatic disease"   CT chest was " negative to metastatic disease.   He underwent colonoscopy and stent placement. 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 Pathology from colonic mass showed moderately differentiated adenocarcinoma, pMMR. HER 2 negative, VIRI/SHERI NGS was cancelled due to insufficient quantity      He started palliative chemotherapy with FOLFOX+Pema      Restaging scans on 07/22/2021 showed significant decrease in size trasverse colon mass as well liver metastatic lesion.   He was switched to maintenance 5FU+Pema from C8  Restaging scans from 10/05/2021 (after C11) showing stable-mildly improved liver mets.   Restaging scans 12/28/2021 with stable disease.      Interval History:   He presents today with his daughter prior to cycle 20  He is tolerating treatment with no issues. He is feeling overall well. He denies nausea, vomiting, constipation. He has good appetite.        Past Medical History:        Past Medical History:   Diagnosis Date    Hypertension      Metastatic colon cancer to liver 4/22/2021         Past Surgical HIstory:         Past Surgical History:   Procedure Laterality Date    COLONOSCOPY N/A 4/20/2021     Procedure: COLONOSCOPY with possible stent;  Surgeon: EDILMA Bonilla MD;  Location: [facility] ([address] [place] [address]);  Service: Colon and Rectal;  Laterality: N/A;    INSERTION OF TUNNELED CENTRAL VENOUS CATHETER (CVC) WITH SUBCUTANEOUS PORT N/A 5/3/2021     Procedure: [id number];  Surgeon: Francisco Layton MD;  Location: [facility] OR [address] [place] [address];  Service: Vascular;  Laterality: N/A;         Family History: No family history on file.     Social History:  reports that he has never smoked. He has never used smokeless tobacco. He reports current alcohol use.     Allergies:  Review of patient's allergies indicates:  No Known Allergies     Medications:  Current Medications          Current Outpatient Medications   Medication Sig Dispense Refill     acetaminophen (TYLENOL) 500 MG tablet Take 1 tablet (500 mg total) by mouth every 6 (six) hours as needed for Pain (alternate with ibuprofen). (Patient not taking: No sig reported)   0    amLODIPine (NORVASC) 10 MG tablet Take 1 tablet (10 mg total) by mouth once daily. 30 tablet 3    ibuprofen (ADVIL,MOTRIN) 400 MG tablet Take 1 tablet (400 mg total) by mouth every 6 (six) hours as needed for Other (pain). Alternate with tylenol (Patient not taking: No sig reported)        LIDOcaine-prilocaine (EMLA) cream Apply topically as needed (Apply one hour before treatment). 30 g 5    ondansetron (ZOFRAN) 4 MG tablet Take 1 tablet (4 mg total) by mouth every 8 (eight) hours as needed for Nausea. (Patient not taking: No sig reported) 30 tablet 3                Current Facility-Administered Medications   Medication Dose Route Frequency Provider Last Rate Last Admin    sars-cov-2 (covid-19) 30 mcg/0.3 ml injection 0.3 mL  0.3 mL Intramuscular 1 time in Clinic/HOD Idania Rock LPN                Review of Systems   Constitutional: Negative for appetite change, fatigue, fever and unexpected weight change.   HENT: Negative for congestion.    Eyes: Negative for pain and visual disturbance.   Respiratory: Negative for cough, chest tightness, shortness of breath and wheezing.    Cardiovascular: Negative for chest pain, palpitations and leg swelling.   Gastrointestinal: Negative for abdominal pain, blood in stool, constipation, diarrhea, nausea and vomiting.   Genitourinary: Negative for difficulty urinating, flank pain, frequency, hematuria and urgency.   Musculoskeletal: Negative for arthralgias, back pain and myalgias.   Neurological: Negative for dizziness, weakness, numbness and headaches.   Psychiatric/Behavioral: The patient is not nervous/anxious.          ECOG Performance Status: 1   Objective:   Vitals:   Vitals   There were no vitals filed for this visit.     Physical Exam  Constitutional:       General: He is not  in acute distress.     Appearance: Normal appearance.   HENT:      Head: Normocephalic and atraumatic.   Eyes:      Pupils: Pupils are equal, round, and reactive to light.   Cardiovascular:      Rate and Rhythm: Normal rate and regular rhythm.      Heart sounds: No murmur heard.       Pulmonary:      Effort: No respiratory distress.      Breath sounds: Normal breath sounds. No wheezing.   Abdominal:      General: Abdomen is flat. Bowel sounds are normal. There is no distension.      Palpations: Abdomen is soft. There is no mass.      Tenderness: There is no abdominal tenderness.   Musculoskeletal:         General: No swelling or deformity.   Skin:     Coloration: Skin is not jaundiced.   Neurological:      General: No focal deficit present.      Mental Status: He is alert and oriented to person, place, and time. Mental status is at baseline.          Laboratory Data:          No visits with results within 1 Week(s) from this visit.   Latest known visit with results is:   Lab Visit on 01/13/2022   Component Date Value Ref Range Status    WBC 01/13/2022 8.90  3.90 - 12.70 K/uL Final    RBC 01/13/2022 4.74  4.60 - 6.20 M/uL Final    Hemoglobin 01/13/2022 14.0  14.0 - 18.0 g/dL Final    Hematocrit 01/13/2022 45.0  40.0 - 54.0 % Final    MCV 01/13/2022 95  82 - 98 fL Final    MCH 01/13/2022 29.5  27.0 - 31.0 pg Final    MCHC 01/13/2022 31.1* 32.0 - 36.0 g/dL Final    RDW 01/13/2022 14.6* 11.5 - 14.5 % Final    Platelets 01/13/2022 276  150 - 450 K/uL Final    MPV 01/13/2022 10.3  9.2 - 12.9 fL Final    Gran # (ANC) 01/13/2022 4.8  1.8 - 7.7 K/uL Final     Comment: The ANC is based on a white cell differential from an   automated cell counter. It has not been microscopically   reviewed for the presence of abnormal cells. Clinical   correlation is required.       Immature Grans (Abs) 01/13/2022 0.02  0.00 - 0.04 K/uL Final     Comment: Mild elevation in immature granulocytes is non specific and   can be seen in  a variety of conditions including stress response,   acute inflammation, trauma and pregnancy. Correlation with other   laboratory and clinical findings is essential.       Sodium 01/13/2022 139  136 - 145 mmol/L Final    Potassium 01/13/2022 4.3  3.5 - 5.1 mmol/L Final    Chloride 01/13/2022 104  95 - 110 mmol/L Final    CO2 01/13/2022 27  23 - 29 mmol/L Final    Glucose 01/13/2022 111* 70 - 110 mg/dL Final    BUN 01/13/2022 14  8 - 23 mg/dL Final    Creatinine 01/13/2022 1.0  0.5 - 1.4 mg/dL Final    Calcium 01/13/2022 9.0  8.7 - 10.5 mg/dL Final    Total Protein 01/13/2022 7.0  6.0 - 8.4 g/dL Final    Albumin 01/13/2022 3.4* 3.5 - 5.2 g/dL Final    Total Bilirubin 01/13/2022 0.7  0.1 - 1.0 mg/dL Final     Comment: For infants and newborns, interpretation of results should be based  on gestational age, weight and in agreement with clinical  observations.     Premature Infant recommended reference ranges:  Up to 24 hours.............<8.0 mg/dL  Up to 48 hours............<12.0 mg/dL  3-5 days..................<15.0 mg/dL  6-29 days.................<15.0 mg/dL       Alkaline Phosphatase 01/13/2022 88  55 - 135 U/L Final    AST 01/13/2022 24  10 - 40 U/L Final    ALT 01/13/2022 19  10 - 44 U/L Final    Anion Gap 01/13/2022 8  8 - 16 mmol/L Final    eGFR if African American 01/13/2022 >60.0  >60 mL/min/1.73 m^2 Final    eGFR if non African American 01/13/2022 >60.0  >60 mL/min/1.73 m^2 Final     Comment: Calculation used to obtain the estimated glomerular filtration  rate (eGFR) is the CKD-EPI equation.          CT CAP 12/28/21  Patient with known metastatic colon cancer.  Hepatic lesions appears similar        Assessment and Plan        1. Colon cancer metastasized to liver    2. Hypertension, unspecified type       1.  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 VIRI couldn't be sent due to insufficient sample. Guardant 360 sent was negative for BRAF, VIRI, HER2  amplification.   Pretreatment CEA 57.  We had a long and sonia discussion with him about his diagnosis. Unfortunately, the disease is not curable but remains treatable and he has good performance status.   Restaging scans after 7 cycles showed significant reduction in colonic mass and liver mass.   CEA 57.8 (pretreatment) -> Today is 2.1  we switched to maintenance as of cycle 8 with 5FU + Pema  Restaging scans from December 2021 show stable disease.      Labs reviewed from today, proceed with cycle 20  Restaging scans next month.  (02/2022)         2  Controlled         The above is discussed and staffed with Dr. Schuster

## 2022-01-27 NOTE — PLAN OF CARE
Pt here for Avastin/5 FU.  Assessment complete and labs reviewed.  PAC accessed using sterile technique.  Pt tolerated Avastin well; BP WNL 10 min post-Avastin.  Placed pt on CADD 5 FU pump; pump infusing without difficulty prior to dc.  Pt to RTC 1/29/21 at noon for pump d/c.  No questions or concerns.  Pt ambulated out of unit accompanied by his daughter.

## 2022-01-29 ENCOUNTER — INFUSION (OUTPATIENT)
Dept: INFUSION THERAPY | Facility: HOSPITAL | Age: 72
End: 2022-01-29
Payer: MEDICARE

## 2022-01-29 VITALS
DIASTOLIC BLOOD PRESSURE: 77 MMHG | SYSTOLIC BLOOD PRESSURE: 122 MMHG | TEMPERATURE: 99 F | BODY MASS INDEX: 21.45 KG/M2 | RESPIRATION RATE: 18 BRPM | HEIGHT: 67 IN | WEIGHT: 136.69 LBS | HEART RATE: 75 BPM

## 2022-01-29 DIAGNOSIS — C18.9 METASTATIC COLON CANCER TO LIVER: Primary | ICD-10-CM

## 2022-01-29 DIAGNOSIS — C78.7 METASTATIC COLON CANCER TO LIVER: Primary | ICD-10-CM

## 2022-01-29 PROCEDURE — 99211 OFF/OP EST MAY X REQ PHY/QHP: CPT

## 2022-01-29 PROCEDURE — 63600175 PHARM REV CODE 636 W HCPCS: Performed by: INTERNAL MEDICINE

## 2022-01-29 PROCEDURE — A4216 STERILE WATER/SALINE, 10 ML: HCPCS | Performed by: INTERNAL MEDICINE

## 2022-01-29 PROCEDURE — 25000003 PHARM REV CODE 250: Performed by: INTERNAL MEDICINE

## 2022-01-29 RX ORDER — HEPARIN 100 UNIT/ML
500 SYRINGE INTRAVENOUS
Status: DISCONTINUED | OUTPATIENT
Start: 2022-01-29 | End: 2022-01-29 | Stop reason: HOSPADM

## 2022-01-29 RX ORDER — SODIUM CHLORIDE 0.9 % (FLUSH) 0.9 %
10 SYRINGE (ML) INJECTION
Status: DISCONTINUED | OUTPATIENT
Start: 2022-01-29 | End: 2022-01-29 | Stop reason: HOSPADM

## 2022-01-29 RX ADMIN — Medication 10 ML: at 11:01

## 2022-01-29 RX ADMIN — HEPARIN 500 UNITS: 100 SYRINGE at 11:01

## 2022-01-29 NOTE — NURSING
1157 pt here for pump d/c, infusion complete, port flushed per policy and deaccessed without issue, no distress noted upon d/c to home

## 2022-02-10 ENCOUNTER — INFUSION (OUTPATIENT)
Dept: INFUSION THERAPY | Facility: HOSPITAL | Age: 72
End: 2022-02-10
Payer: MEDICARE

## 2022-02-10 ENCOUNTER — LAB VISIT (OUTPATIENT)
Dept: LAB | Facility: HOSPITAL | Age: 72
End: 2022-02-10
Payer: MEDICARE

## 2022-02-10 VITALS
HEART RATE: 68 BPM | OXYGEN SATURATION: 97 % | RESPIRATION RATE: 18 BRPM | SYSTOLIC BLOOD PRESSURE: 139 MMHG | DIASTOLIC BLOOD PRESSURE: 84 MMHG | TEMPERATURE: 98 F

## 2022-02-10 DIAGNOSIS — C18.9 METASTATIC COLON CANCER TO LIVER: Primary | ICD-10-CM

## 2022-02-10 DIAGNOSIS — C18.9 METASTATIC COLON CANCER TO LIVER: ICD-10-CM

## 2022-02-10 DIAGNOSIS — C78.7 METASTATIC COLON CANCER TO LIVER: Primary | ICD-10-CM

## 2022-02-10 DIAGNOSIS — C78.7 METASTATIC COLON CANCER TO LIVER: ICD-10-CM

## 2022-02-10 LAB
ALBUMIN SERPL BCP-MCNC: 3.3 G/DL (ref 3.5–5.2)
ALP SERPL-CCNC: 88 U/L (ref 55–135)
ALT SERPL W/O P-5'-P-CCNC: 15 U/L (ref 10–44)
ANION GAP SERPL CALC-SCNC: 7 MMOL/L (ref 8–16)
AST SERPL-CCNC: 22 U/L (ref 10–40)
BILIRUB SERPL-MCNC: 0.8 MG/DL (ref 0.1–1)
BUN SERPL-MCNC: 10 MG/DL (ref 8–23)
CALCIUM SERPL-MCNC: 9.4 MG/DL (ref 8.7–10.5)
CHLORIDE SERPL-SCNC: 99 MMOL/L (ref 95–110)
CO2 SERPL-SCNC: 27 MMOL/L (ref 23–29)
CREAT SERPL-MCNC: 0.9 MG/DL (ref 0.5–1.4)
ERYTHROCYTE [DISTWIDTH] IN BLOOD BY AUTOMATED COUNT: 14.4 % (ref 11.5–14.5)
EST. GFR  (AFRICAN AMERICAN): >60 ML/MIN/1.73 M^2
EST. GFR  (NON AFRICAN AMERICAN): >60 ML/MIN/1.73 M^2
GLUCOSE SERPL-MCNC: 89 MG/DL (ref 70–110)
HCT VFR BLD AUTO: 41.7 % (ref 40–54)
HGB BLD-MCNC: 13.1 G/DL (ref 14–18)
IMM GRANULOCYTES # BLD AUTO: 0.01 K/UL (ref 0–0.04)
MCH RBC QN AUTO: 29.2 PG (ref 27–31)
MCHC RBC AUTO-ENTMCNC: 31.4 G/DL (ref 32–36)
MCV RBC AUTO: 93 FL (ref 82–98)
NEUTROPHILS # BLD AUTO: 3.6 K/UL (ref 1.8–7.7)
PLATELET # BLD AUTO: 276 K/UL (ref 150–450)
PMV BLD AUTO: 10 FL (ref 9.2–12.9)
POTASSIUM SERPL-SCNC: 4.3 MMOL/L (ref 3.5–5.1)
PROT SERPL-MCNC: 7.6 G/DL (ref 6–8.4)
RBC # BLD AUTO: 4.48 M/UL (ref 4.6–6.2)
SODIUM SERPL-SCNC: 133 MMOL/L (ref 136–145)
WBC # BLD AUTO: 8 K/UL (ref 3.9–12.7)

## 2022-02-10 PROCEDURE — 36415 COLL VENOUS BLD VENIPUNCTURE: CPT | Performed by: STUDENT IN AN ORGANIZED HEALTH CARE EDUCATION/TRAINING PROGRAM

## 2022-02-10 PROCEDURE — 96375 TX/PRO/DX INJ NEW DRUG ADDON: CPT

## 2022-02-10 PROCEDURE — 96416 CHEMO PROLONG INFUSE W/PUMP: CPT

## 2022-02-10 PROCEDURE — 85027 COMPLETE CBC AUTOMATED: CPT | Performed by: STUDENT IN AN ORGANIZED HEALTH CARE EDUCATION/TRAINING PROGRAM

## 2022-02-10 PROCEDURE — 96413 CHEMO IV INFUSION 1 HR: CPT

## 2022-02-10 PROCEDURE — 25000003 PHARM REV CODE 250: Performed by: INTERNAL MEDICINE

## 2022-02-10 PROCEDURE — 80053 COMPREHEN METABOLIC PANEL: CPT | Performed by: STUDENT IN AN ORGANIZED HEALTH CARE EDUCATION/TRAINING PROGRAM

## 2022-02-10 PROCEDURE — 63600175 PHARM REV CODE 636 W HCPCS: Mod: JG | Performed by: INTERNAL MEDICINE

## 2022-02-10 RX ORDER — EPINEPHRINE 0.3 MG/.3ML
0.3 INJECTION SUBCUTANEOUS ONCE AS NEEDED
Status: CANCELLED | OUTPATIENT
Start: 2022-02-10

## 2022-02-10 RX ORDER — EPINEPHRINE 0.3 MG/.3ML
0.3 INJECTION SUBCUTANEOUS ONCE AS NEEDED
Status: DISCONTINUED | OUTPATIENT
Start: 2022-02-10 | End: 2022-02-10 | Stop reason: HOSPADM

## 2022-02-10 RX ORDER — SODIUM CHLORIDE 0.9 % (FLUSH) 0.9 %
10 SYRINGE (ML) INJECTION
Status: DISCONTINUED | OUTPATIENT
Start: 2022-02-10 | End: 2022-02-10 | Stop reason: HOSPADM

## 2022-02-10 RX ORDER — ONDANSETRON 2 MG/ML
8 INJECTION INTRAMUSCULAR; INTRAVENOUS
Status: COMPLETED | OUTPATIENT
Start: 2022-02-10 | End: 2022-02-10

## 2022-02-10 RX ORDER — SODIUM CHLORIDE 0.9 % (FLUSH) 0.9 %
10 SYRINGE (ML) INJECTION
Status: CANCELLED | OUTPATIENT
Start: 2022-02-12

## 2022-02-10 RX ORDER — DIPHENHYDRAMINE HYDROCHLORIDE 50 MG/ML
50 INJECTION INTRAMUSCULAR; INTRAVENOUS ONCE AS NEEDED
Status: CANCELLED | OUTPATIENT
Start: 2022-02-10

## 2022-02-10 RX ORDER — HEPARIN 100 UNIT/ML
500 SYRINGE INTRAVENOUS
Status: DISCONTINUED | OUTPATIENT
Start: 2022-02-10 | End: 2022-02-10 | Stop reason: HOSPADM

## 2022-02-10 RX ORDER — HEPARIN 100 UNIT/ML
500 SYRINGE INTRAVENOUS
Status: CANCELLED | OUTPATIENT
Start: 2022-02-10

## 2022-02-10 RX ORDER — HEPARIN 100 UNIT/ML
500 SYRINGE INTRAVENOUS
Status: CANCELLED | OUTPATIENT
Start: 2022-02-12

## 2022-02-10 RX ORDER — DIPHENHYDRAMINE HYDROCHLORIDE 50 MG/ML
50 INJECTION INTRAMUSCULAR; INTRAVENOUS ONCE AS NEEDED
Status: DISCONTINUED | OUTPATIENT
Start: 2022-02-10 | End: 2022-02-10 | Stop reason: HOSPADM

## 2022-02-10 RX ORDER — SODIUM CHLORIDE 0.9 % (FLUSH) 0.9 %
10 SYRINGE (ML) INJECTION
Status: CANCELLED | OUTPATIENT
Start: 2022-02-10

## 2022-02-10 RX ORDER — ONDANSETRON 2 MG/ML
8 INJECTION INTRAMUSCULAR; INTRAVENOUS
Status: CANCELLED | OUTPATIENT
Start: 2022-02-10

## 2022-02-10 RX ADMIN — FLUOROURACIL 4000 MG: 50 INJECTION, SOLUTION INTRAVENOUS at 02:02

## 2022-02-10 RX ADMIN — ONDANSETRON 8 MG: 2 INJECTION, SOLUTION INTRAMUSCULAR; INTRAVENOUS at 12:02

## 2022-02-10 RX ADMIN — BEVACIZUMAB 300 MG: 100 INJECTION, SOLUTION INTRAVENOUS at 01:02

## 2022-02-10 RX ADMIN — SODIUM CHLORIDE: 0.9 INJECTION, SOLUTION INTRAVENOUS at 12:02

## 2022-02-10 NOTE — PLAN OF CARE
1145  Patient seated in chair. Labs not resulted, treatment plan not signed, advised patient of same but got patient comfortable in treatment area.  VSS< Assessment done. @ 1230 accessed port, had patient do positions in order to get blood return, when obtained started NS @ 25 cc/hr KVO while waiting for labs & signature from doctor on Tx plan.  [facility] for safety

## 2022-02-10 NOTE — PLAN OF CARE
1415  Patient completed infusion, tolerated well, VSS.  CADD pump applied with 5FU infusing @ 2.2 cc/hr for total volume of 100 cc over 46 hours.  Patient aware to RTC 2/12/22 around noon for pump disconnect.  Patient ambulated off floor escorted by daughter/

## 2022-02-12 ENCOUNTER — INFUSION (OUTPATIENT)
Dept: INFUSION THERAPY | Facility: HOSPITAL | Age: 72
End: 2022-02-12
Attending: STUDENT IN AN ORGANIZED HEALTH CARE EDUCATION/TRAINING PROGRAM
Payer: MEDICARE

## 2022-02-12 VITALS
HEART RATE: 77 BPM | RESPIRATION RATE: 18 BRPM | TEMPERATURE: 98 F | DIASTOLIC BLOOD PRESSURE: 80 MMHG | SYSTOLIC BLOOD PRESSURE: 136 MMHG

## 2022-02-12 DIAGNOSIS — C78.7 METASTATIC COLON CANCER TO LIVER: Primary | ICD-10-CM

## 2022-02-12 DIAGNOSIS — C18.9 METASTATIC COLON CANCER TO LIVER: Primary | ICD-10-CM

## 2022-02-12 RX ORDER — HEPARIN 100 UNIT/ML
500 SYRINGE INTRAVENOUS
Status: DISCONTINUED | OUTPATIENT
Start: 2022-02-12 | End: 2022-02-12 | Stop reason: HOSPADM

## 2022-02-12 RX ORDER — SODIUM CHLORIDE 0.9 % (FLUSH) 0.9 %
10 SYRINGE (ML) INJECTION
Status: DISCONTINUED | OUTPATIENT
Start: 2022-02-12 | End: 2022-02-12 | Stop reason: HOSPADM

## 2022-02-12 NOTE — NURSING
1145 pt here for pump d/c, infusion complete, port flushed per policy and deaccessed without issue, no distress noted upon d/c to home

## 2022-02-14 DIAGNOSIS — I10 HYPERTENSION, UNSPECIFIED TYPE: ICD-10-CM

## 2022-02-14 RX ORDER — AMLODIPINE BESYLATE 10 MG/1
10 TABLET ORAL DAILY
Qty: 30 TABLET | Refills: 3 | Status: SHIPPED | OUTPATIENT
Start: 2022-02-14 | End: 2022-04-21 | Stop reason: SDUPTHER

## 2022-02-23 NOTE — PROGRESS NOTES
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 Downs is a 71 y.o. male who returns following hospital discharge.  70 years old pleasant AA gentleman, with history of hypertension, presenting with two weeks duration of abdominal cramps, diarrhea, nausea and vomiting. His symptoms started gradually with intermittent generalize crampy abdominal pain, worse with food. That was associated with nausea, reflux and occaisonal vomiting. He tried pepto-bismol and imodium with no relief, and hence he presented to ER.   He lost significant amount of weight, but cannot quantify the amount or the time period. He has also been feeling weaker than usual.   He hasn't seen a healthcare provider in over than 10 years. He has never had a colonoscopy.   In the ER. His labs were significant of microcytic anemia of 9.7 g/dl, MCV 77, and Platelets counts of 495. CT of the abdomen and pelvis showed " Large mass in the transverse colon highly suspicious for adenocarcinoma resulting in at least high-grade partial obstruction with dilation of proximal colon and small bowel. Soft tissue nodules in the adjacent mesentery are suspicious for pily metastases and/or mesenteric implants.  Numerous hepatic masses measuring up to 11.2 cm most likely related to metastatic disease.  There also several " "small subcapsular lesions which could reflect additional metastatic disease"   CT chest was negative to metastatic disease.   He underwent colonoscopy and stent placement. 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 HER 2 negative, VIRI/SHERI NGS was cancelled due to insufficient quantity   Guardant 360 was sent, negative for KRAS, NRAS, BRAF     He started palliative chemotherapy with FOLFOX+Pema     Restaging scans on 07/22/2021 showed significant decrease in size trasverse colon mass as well liver metastatic lesion.     He was switched to maintenance 5FU+Pema from C8    Restaging scans from 10/05/2021 (after C11) showing stable-mildly improved liver mets.   Restaging scans from 12/28/2021 (after C17) showing stable disease.     Interval History:   He presents today with his daughter prior to cycle 22   He is tolerating treatment with no issues. He is feeling overall well. He denies nausea, vomiting, constipation.   He has good appetite.  He notes some episodes of nose bleed over the last 2 weeks after sneezing. Burning sensation of both feet, but no peeling of skin or rash     Past Medical History:   Past Medical History:   Diagnosis Date    Hypertension     Metastatic colon cancer to liver 4/22/2021       Past Surgical HIstory:   Past Surgical History:   Procedure Laterality Date    COLONOSCOPY N/A 4/20/2021    Procedure: COLONOSCOPY with possible stent;  Surgeon: EDILMA Bonilla MD;  Location: [facility] ([address] [place] [address]);  Service: Colon and Rectal;  Laterality: N/A;    INSERTION OF TUNNELED CENTRAL VENOUS CATHETER (CVC) WITH SUBCUTANEOUS PORT N/A 5/3/2021    Procedure: [id number];  Surgeon: Francisco Layton MD;  Location: [facility] OR [address] [place] [address];  Service: Vascular;  Laterality: N/A;       Family History: History reviewed. No pertinent family history.    Social " History:  reports that he has never smoked. He has never used smokeless tobacco. He reports current alcohol use.    Allergies:  Review of patient's allergies indicates:  No Known Allergies    Medications:  Current Outpatient Medications   Medication Sig Dispense Refill    amLODIPine (NORVASC) 10 MG tablet Take 1 tablet (10 mg total) by mouth once daily. 30 tablet 3    LIDOcaine-prilocaine (EMLA) cream Apply topically as needed (Apply one hour before treatment). 30 g 5    acetaminophen (TYLENOL) 500 MG tablet Take 1 tablet (500 mg total) by mouth every 6 (six) hours as needed for Pain (alternate with ibuprofen). (Patient not taking: No sig reported)  0    ibuprofen (ADVIL,MOTRIN) 400 MG tablet Take 1 tablet (400 mg total) by mouth every 6 (six) hours as needed for Other (pain). Alternate with tylenol (Patient not taking: No sig reported)      ondansetron (ZOFRAN) 4 MG tablet Take 1 tablet (4 mg total) by mouth every 8 (eight) hours as needed for Nausea. (Patient not taking: No sig reported) 30 tablet 3     Current Facility-Administered Medications   Medication Dose Route Frequency Provider Last Rate Last Admin    sars-cov-2 (covid-19) 30 mcg/0.3 ml injection 0.3 mL  0.3 mL Intramuscular 1 time in Clinic/HOD Idania Rokc LPN           Review of Systems   Constitutional: Negative for appetite change, fatigue, fever and unexpected weight change.   HENT: Positive for nosebleeds. Negative for congestion.    Eyes: Negative for pain and visual disturbance.   Respiratory: Negative for cough, chest tightness, shortness of breath and wheezing.    Cardiovascular: Negative for chest pain, palpitations and leg swelling.   Gastrointestinal: Negative for abdominal pain, blood in stool, constipation, diarrhea, nausea and vomiting.   Genitourinary: Negative for difficulty urinating, flank pain, frequency, hematuria and urgency.   Musculoskeletal: Negative for arthralgias, back pain and myalgias.   Neurological: Negative for  "dizziness, weakness, numbness and headaches.   Psychiatric/Behavioral: The patient is not nervous/anxious.        ECOG Performance Status: 1   Objective:      Vitals:   Vitals:    02/24/22 0820   BP: 123/77   Pulse: 77   Resp: 18   Temp: 98.4 °F (36.9 °C)   TempSrc: Oral   SpO2: 98%   Weight: 62.9 kg (138 lb 10.7 oz)   Height: 5' 7" (1.702 m)     Physical Exam  Constitutional:       General: He is not in acute distress.     Appearance: Normal appearance.   HENT:      Head: Normocephalic and atraumatic.   Eyes:      Pupils: Pupils are equal, round, and reactive to light.   Cardiovascular:      Rate and Rhythm: Normal rate and regular rhythm.      Heart sounds: No murmur heard.  Pulmonary:      Effort: No respiratory distress.      Breath sounds: Normal breath sounds. No wheezing.   Abdominal:      General: Abdomen is flat. Bowel sounds are normal. There is no distension.      Palpations: Abdomen is soft. There is no mass.      Tenderness: There is no abdominal tenderness.   Musculoskeletal:         General: No swelling or deformity.   Skin:     Coloration: Skin is not jaundiced.   Neurological:      General: No focal deficit present.      Mental Status: He is alert and oriented to person, place, and time. Mental status is at baseline.         Laboratory Data:  Lab Visit on 02/24/2022   Component Date Value Ref Range Status    WBC 02/24/2022 9.79  3.90 - 12.70 K/uL Final    RBC 02/24/2022 4.54 (A) 4.60 - 6.20 M/uL Final    Hemoglobin 02/24/2022 13.5 (A) 14.0 - 18.0 g/dL Final    Hematocrit 02/24/2022 43.1  40.0 - 54.0 % Final    MCV 02/24/2022 95  82 - 98 fL Final    MCH 02/24/2022 29.7  27.0 - 31.0 pg Final    MCHC 02/24/2022 31.3 (A) 32.0 - 36.0 g/dL Final    RDW 02/24/2022 15.0 (A) 11.5 - 14.5 % Final    Platelets 02/24/2022 261  150 - 450 K/uL Final    MPV 02/24/2022 10.0  9.2 - 12.9 fL Final    Gran # (ANC) 02/24/2022 4.8  1.8 - 7.7 K/uL Final    Comment: The ANC is based on a white cell differential " from an   automated cell counter. It has not been microscopically   reviewed for the presence of abnormal cells. Clinical   correlation is required.      Immature Grans (Abs) 02/24/2022 0.02  0.00 - 0.04 K/uL Final    Comment: Mild elevation in immature granulocytes is non specific and   can be seen in a variety of conditions including stress response,   acute inflammation, trauma and pregnancy. Correlation with other   laboratory and clinical findings is essential.         CT abdomen pelvis:  Three hypodense heterogeneous liver masses are detected.  The largest is centered in the right hepatic lobe and measures 11.1 x 9.9 x 9.8 cm.  There are also several low attenuation subcapsular lesions measuring up to 4.3 cm at the dome of the liver (601:67) which could be due to metastatic disease, subcapsular hematoma, or less likely subcapsular infectious process.  The gallbladder is nondistended.  The portosplenic confluence is distended with a large filling defect which could reflect bland thrombus or tumor thrombus measuring 2.2 cm.  This extends into the superior mesenteric vein which is also distended.     The pancreas, spleen, and adrenal glands have a normal appearance.     The kidneys demonstrate normal cortical thickness without hydronephrosis.     There is a soft tissue mass in the transverse colon measuring 4.5 x 4.2 x 3.6 cm.  This is highly suspicious for adenocarcinoma and results in at least high-grade partial colonic obstruction with dilated proximal colon as well as dilated small bowel loops.  Adjacent soft tissue nodules are noted in the mesentery in this region likely reflecting local pily metastases and or mesenteric implants.  The largest discrete     Areas of wall thickening are noted in the distal small bowel and cecum which could be related to venous obstruction at the level of the superior mesenteric vein thrombus.  Scattered colonic diverticula are present without diverticulitis.  There is  scattered mild ascites.     The bladder is nondistended and not well evaluated.  The prostate appears mildly enlarged.     No osseous abnormalities identified.     CT chest abdomen pelvis 07/22  Impression:     1. Significant decrease in size of previously identified transverse colon obstructing mass.  The dominant hepatic lesion and presumed portosplenic thrombus have also decreased in size suggesting favorable sponsored therapy.  However, there is an indeterminate left hepatic dome lesion which appears more conspicuous.  2. Interval placement of a transverse colon stent with resolution of prior bowel obstruction.  3. Sigmoid diverticulosis with diffusely thickened appearance of the proximal sigmoid, possibly related to incomplete distension.  No surrounding findings of diverticulitis.  Correlation with colonoscopy as clinically warranted.    CT C/A/P 10/05/2021  There is decrease in size of right hepatic lobe lesion with cyst stable size of left hepatic lobe lesion.  Additional stable subcentimeter hypodensities with no evidence of any lesions.     Continued decreased size of presumed thrombus at the portal splenic confluence.     Stent remains in the transverse colon with continued decreased conspicuity of previously described mass.    CT CAP 12/28/21  Patient with known metastatic colon cancer.  Hepatic lesions appears similar, measurements as above.      Assessment and Plan        1. Metastatic colon cancer to liver    2. Primary hypertension      1.  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 VIRI couldn't be sent due to insufficient sample. Guardant 360 sent was negative for BRAF, VIRI, HER2 amplification.   Pretreatment CEA 57.  We had a long and sonia discussion with him about his diagnosis. Unfortunately, the disease is not curable but remains treatable and he has good performance status.   Restaging scans after 7 cycles showed significant reduction in colonic mass and  liver mass.   we switched to maintenance as of cycle 8 with 5FU + Pema  Restaging scans from December 2021 show stable disease.   CEA 57.8 (pretreatment) -> Today 2.2    Labs reviewed from today, proceed with cycle 22  Restaging scans after next cycle         2. Hypertension   Controlled     3 .palmar-plantar erythrodysesthesia  Mild - intermittent.   No skin changes or peeling.   instructed to notify me if it gets worse.   Will monitor for now.       Follow-up:   CBC,CMP and chemo chair for 5FU+Pema infusion in two weeks 03/10  CT CAP 03/22  See me in four weeks 03/24 with CBC,CMP,CEA and chemo chair for  5FU+Pema infusion       The above is discussed and staffed with Dr. Landen Contreras MD  [id number]  Hematology/Oncology fellow

## 2022-02-24 ENCOUNTER — OFFICE VISIT (OUTPATIENT)
Dept: HEMATOLOGY/ONCOLOGY | Facility: CLINIC | Age: 72
End: 2022-02-24
Payer: MEDICARE

## 2022-02-24 ENCOUNTER — INFUSION (OUTPATIENT)
Dept: INFUSION THERAPY | Facility: HOSPITAL | Age: 72
End: 2022-02-24
Attending: STUDENT IN AN ORGANIZED HEALTH CARE EDUCATION/TRAINING PROGRAM
Payer: MEDICARE

## 2022-02-24 VITALS
DIASTOLIC BLOOD PRESSURE: 77 MMHG | HEIGHT: 67 IN | SYSTOLIC BLOOD PRESSURE: 123 MMHG | OXYGEN SATURATION: 98 % | RESPIRATION RATE: 18 BRPM | TEMPERATURE: 98 F | HEART RATE: 77 BPM | BODY MASS INDEX: 21.77 KG/M2 | WEIGHT: 138.69 LBS

## 2022-02-24 VITALS
HEIGHT: 67 IN | DIASTOLIC BLOOD PRESSURE: 76 MMHG | HEART RATE: 63 BPM | WEIGHT: 138.69 LBS | BODY MASS INDEX: 21.77 KG/M2 | TEMPERATURE: 98 F | SYSTOLIC BLOOD PRESSURE: 120 MMHG | RESPIRATION RATE: 18 BRPM

## 2022-02-24 DIAGNOSIS — C78.7 METASTATIC COLON CANCER TO LIVER: Primary | ICD-10-CM

## 2022-02-24 DIAGNOSIS — C18.9 METASTATIC COLON CANCER TO LIVER: Primary | ICD-10-CM

## 2022-02-24 DIAGNOSIS — I10 PRIMARY HYPERTENSION: ICD-10-CM

## 2022-02-24 LAB
BILIRUB UR QL STRIP: NEGATIVE
CLARITY UR REFRACT.AUTO: CLEAR
COLOR UR AUTO: YELLOW
GLUCOSE UR QL STRIP: NEGATIVE
HGB UR QL STRIP: NEGATIVE
KETONES UR QL STRIP: NEGATIVE
LEUKOCYTE ESTERASE UR QL STRIP: NEGATIVE
NITRITE UR QL STRIP: NEGATIVE
PH UR STRIP: 6 [PH] (ref 5–8)
PROT UR QL STRIP: NEGATIVE
SP GR UR STRIP: 1.01 (ref 1–1.03)
URN SPEC COLLECT METH UR: NORMAL

## 2022-02-24 PROCEDURE — 96413 CHEMO IV INFUSION 1 HR: CPT

## 2022-02-24 PROCEDURE — 96416 CHEMO PROLONG INFUSE W/PUMP: CPT

## 2022-02-24 PROCEDURE — 99999 PR PBB SHADOW E&M-EST. PATIENT-LVL III: CPT | Mod: PBBFAC,GC,, | Performed by: STUDENT IN AN ORGANIZED HEALTH CARE EDUCATION/TRAINING PROGRAM

## 2022-02-24 PROCEDURE — 99215 OFFICE O/P EST HI 40 MIN: CPT | Mod: S$PBB,GC,, | Performed by: STUDENT IN AN ORGANIZED HEALTH CARE EDUCATION/TRAINING PROGRAM

## 2022-02-24 PROCEDURE — 96376 TX/PRO/DX INJ SAME DRUG ADON: CPT

## 2022-02-24 PROCEDURE — 25000003 PHARM REV CODE 250: Performed by: INTERNAL MEDICINE

## 2022-02-24 PROCEDURE — 99215 PR OFFICE/OUTPT VISIT, EST, LEVL V, 40-54 MIN: ICD-10-PCS | Mod: S$PBB,GC,, | Performed by: STUDENT IN AN ORGANIZED HEALTH CARE EDUCATION/TRAINING PROGRAM

## 2022-02-24 PROCEDURE — 99999 PR PBB SHADOW E&M-EST. PATIENT-LVL III: ICD-10-PCS | Mod: PBBFAC,GC,, | Performed by: STUDENT IN AN ORGANIZED HEALTH CARE EDUCATION/TRAINING PROGRAM

## 2022-02-24 PROCEDURE — 81003 URINALYSIS AUTO W/O SCOPE: CPT | Performed by: STUDENT IN AN ORGANIZED HEALTH CARE EDUCATION/TRAINING PROGRAM

## 2022-02-24 PROCEDURE — 99213 OFFICE O/P EST LOW 20 MIN: CPT | Mod: PBBFAC,25 | Performed by: STUDENT IN AN ORGANIZED HEALTH CARE EDUCATION/TRAINING PROGRAM

## 2022-02-24 PROCEDURE — 63600175 PHARM REV CODE 636 W HCPCS: Performed by: INTERNAL MEDICINE

## 2022-02-24 RX ORDER — HEPARIN 100 UNIT/ML
500 SYRINGE INTRAVENOUS
Status: CANCELLED | OUTPATIENT
Start: 2022-03-15

## 2022-02-24 RX ORDER — HEPARIN 100 UNIT/ML
500 SYRINGE INTRAVENOUS
Status: CANCELLED | OUTPATIENT
Start: 2022-03-10

## 2022-02-24 RX ORDER — SODIUM CHLORIDE 0.9 % (FLUSH) 0.9 %
10 SYRINGE (ML) INJECTION
Status: CANCELLED | OUTPATIENT
Start: 2022-03-10

## 2022-02-24 RX ORDER — EPINEPHRINE 0.3 MG/.3ML
0.3 INJECTION SUBCUTANEOUS ONCE AS NEEDED
Status: CANCELLED | OUTPATIENT
Start: 2022-02-24

## 2022-02-24 RX ORDER — ONDANSETRON 2 MG/ML
8 INJECTION INTRAMUSCULAR; INTRAVENOUS
Status: CANCELLED | OUTPATIENT
Start: 2022-02-24

## 2022-02-24 RX ORDER — SODIUM CHLORIDE 0.9 % (FLUSH) 0.9 %
10 SYRINGE (ML) INJECTION
Status: CANCELLED | OUTPATIENT
Start: 2022-02-26

## 2022-02-24 RX ORDER — HEPARIN 100 UNIT/ML
500 SYRINGE INTRAVENOUS
Status: DISCONTINUED | OUTPATIENT
Start: 2022-02-24 | End: 2022-02-24 | Stop reason: HOSPADM

## 2022-02-24 RX ORDER — EPINEPHRINE 0.3 MG/.3ML
0.3 INJECTION SUBCUTANEOUS ONCE AS NEEDED
Status: CANCELLED | OUTPATIENT
Start: 2022-03-10

## 2022-02-24 RX ORDER — SODIUM CHLORIDE 0.9 % (FLUSH) 0.9 %
10 SYRINGE (ML) INJECTION
Status: CANCELLED | OUTPATIENT
Start: 2022-03-15

## 2022-02-24 RX ORDER — ONDANSETRON 2 MG/ML
8 INJECTION INTRAMUSCULAR; INTRAVENOUS
Status: CANCELLED | OUTPATIENT
Start: 2022-03-10

## 2022-02-24 RX ORDER — DIPHENHYDRAMINE HYDROCHLORIDE 50 MG/ML
50 INJECTION INTRAMUSCULAR; INTRAVENOUS ONCE AS NEEDED
Status: DISCONTINUED | OUTPATIENT
Start: 2022-02-24 | End: 2022-02-24 | Stop reason: HOSPADM

## 2022-02-24 RX ORDER — DIPHENHYDRAMINE HYDROCHLORIDE 50 MG/ML
50 INJECTION INTRAMUSCULAR; INTRAVENOUS ONCE AS NEEDED
Status: CANCELLED | OUTPATIENT
Start: 2022-02-24

## 2022-02-24 RX ORDER — EPINEPHRINE 0.3 MG/.3ML
0.3 INJECTION SUBCUTANEOUS ONCE AS NEEDED
Status: DISCONTINUED | OUTPATIENT
Start: 2022-02-24 | End: 2022-02-24 | Stop reason: HOSPADM

## 2022-02-24 RX ORDER — HEPARIN 100 UNIT/ML
500 SYRINGE INTRAVENOUS
Status: CANCELLED | OUTPATIENT
Start: 2022-02-26

## 2022-02-24 RX ORDER — HEPARIN 100 UNIT/ML
500 SYRINGE INTRAVENOUS
Status: CANCELLED | OUTPATIENT
Start: 2022-02-24

## 2022-02-24 RX ORDER — ONDANSETRON 2 MG/ML
8 INJECTION INTRAMUSCULAR; INTRAVENOUS
Status: COMPLETED | OUTPATIENT
Start: 2022-02-24 | End: 2022-02-24

## 2022-02-24 RX ORDER — SODIUM CHLORIDE 0.9 % (FLUSH) 0.9 %
10 SYRINGE (ML) INJECTION
Status: CANCELLED | OUTPATIENT
Start: 2022-02-24

## 2022-02-24 RX ORDER — DIPHENHYDRAMINE HYDROCHLORIDE 50 MG/ML
50 INJECTION INTRAMUSCULAR; INTRAVENOUS ONCE AS NEEDED
Status: CANCELLED | OUTPATIENT
Start: 2022-03-10

## 2022-02-24 RX ORDER — SODIUM CHLORIDE 0.9 % (FLUSH) 0.9 %
10 SYRINGE (ML) INJECTION
Status: DISCONTINUED | OUTPATIENT
Start: 2022-02-24 | End: 2022-02-24 | Stop reason: HOSPADM

## 2022-02-24 RX ADMIN — BEVACIZUMAB 300 MG: 100 INJECTION, SOLUTION INTRAVENOUS at 10:02

## 2022-02-24 RX ADMIN — ONDANSETRON 8 MG: 2 INJECTION, SOLUTION INTRAMUSCULAR; INTRAVENOUS at 09:02

## 2022-02-24 RX ADMIN — FLUOROURACIL 4000 MG: 50 INJECTION, SOLUTION INTRAVENOUS at 10:02

## 2022-02-24 NOTE — Clinical Note
CBC,CMP and chemo chair for 5FU+Pema infusion in two weeks 03/10 CT CAP 03/22 See me in four weeks 03/24 with CBC,CMP,CEA and chemo chair for  5FU+Pema infusion

## 2022-02-26 ENCOUNTER — INFUSION (OUTPATIENT)
Dept: INFUSION THERAPY | Facility: HOSPITAL | Age: 72
End: 2022-02-26
Payer: MEDICARE

## 2022-02-26 VITALS
HEART RATE: 69 BPM | RESPIRATION RATE: 18 BRPM | SYSTOLIC BLOOD PRESSURE: 133 MMHG | TEMPERATURE: 98 F | DIASTOLIC BLOOD PRESSURE: 85 MMHG

## 2022-02-26 DIAGNOSIS — C78.7 METASTATIC COLON CANCER TO LIVER: Primary | ICD-10-CM

## 2022-02-26 DIAGNOSIS — C18.9 METASTATIC COLON CANCER TO LIVER: Primary | ICD-10-CM

## 2022-02-26 PROCEDURE — 99211 OFF/OP EST MAY X REQ PHY/QHP: CPT

## 2022-02-26 PROCEDURE — 63600175 PHARM REV CODE 636 W HCPCS: Performed by: INTERNAL MEDICINE

## 2022-02-26 PROCEDURE — 25000003 PHARM REV CODE 250: Performed by: INTERNAL MEDICINE

## 2022-02-26 PROCEDURE — A4216 STERILE WATER/SALINE, 10 ML: HCPCS | Performed by: INTERNAL MEDICINE

## 2022-02-26 RX ORDER — HEPARIN 100 UNIT/ML
500 SYRINGE INTRAVENOUS
Status: DISCONTINUED | OUTPATIENT
Start: 2022-02-26 | End: 2022-02-26 | Stop reason: HOSPADM

## 2022-02-26 RX ORDER — SODIUM CHLORIDE 0.9 % (FLUSH) 0.9 %
10 SYRINGE (ML) INJECTION
Status: DISCONTINUED | OUTPATIENT
Start: 2022-02-26 | End: 2022-02-26 | Stop reason: HOSPADM

## 2022-02-26 RX ADMIN — Medication 10 ML: at 09:02

## 2022-02-26 RX ADMIN — HEPARIN 500 UNITS: 100 SYRINGE at 09:02

## 2022-02-26 NOTE — NURSING
0930 pt here for pump d/c, infusion complete, port flushed per policy and deaccessed without issue, no distress noted upon d/c to home with daughter

## 2022-03-03 DIAGNOSIS — C78.7 METASTATIC COLON CANCER TO LIVER: Primary | ICD-10-CM

## 2022-03-03 DIAGNOSIS — C18.9 METASTATIC COLON CANCER TO LIVER: Primary | ICD-10-CM

## 2022-03-11 ENCOUNTER — LAB VISIT (OUTPATIENT)
Dept: LAB | Facility: HOSPITAL | Age: 72
End: 2022-03-11
Payer: MEDICARE

## 2022-03-11 DIAGNOSIS — C78.7 METASTATIC COLON CANCER TO LIVER: ICD-10-CM

## 2022-03-11 DIAGNOSIS — C18.9 METASTATIC COLON CANCER TO LIVER: ICD-10-CM

## 2022-03-11 LAB
ALBUMIN SERPL BCP-MCNC: 3.3 G/DL (ref 3.5–5.2)
ALP SERPL-CCNC: 88 U/L (ref 55–135)
ALT SERPL W/O P-5'-P-CCNC: 17 U/L (ref 10–44)
ANION GAP SERPL CALC-SCNC: 9 MMOL/L (ref 8–16)
AST SERPL-CCNC: 22 U/L (ref 10–40)
BILIRUB SERPL-MCNC: 0.6 MG/DL (ref 0.1–1)
BUN SERPL-MCNC: 9 MG/DL (ref 8–23)
CALCIUM SERPL-MCNC: 9.7 MG/DL (ref 8.7–10.5)
CHLORIDE SERPL-SCNC: 103 MMOL/L (ref 95–110)
CO2 SERPL-SCNC: 29 MMOL/L (ref 23–29)
CREAT SERPL-MCNC: 1 MG/DL (ref 0.5–1.4)
ERYTHROCYTE [DISTWIDTH] IN BLOOD BY AUTOMATED COUNT: 15 % (ref 11.5–14.5)
EST. GFR  (AFRICAN AMERICAN): >60 ML/MIN/1.73 M^2
EST. GFR  (NON AFRICAN AMERICAN): >60 ML/MIN/1.73 M^2
GLUCOSE SERPL-MCNC: 90 MG/DL (ref 70–110)
HCT VFR BLD AUTO: 44 % (ref 40–54)
HGB BLD-MCNC: 14 G/DL (ref 14–18)
IMM GRANULOCYTES # BLD AUTO: 0.02 K/UL (ref 0–0.04)
MCH RBC QN AUTO: 29.7 PG (ref 27–31)
MCHC RBC AUTO-ENTMCNC: 31.8 G/DL (ref 32–36)
MCV RBC AUTO: 93 FL (ref 82–98)
NEUTROPHILS # BLD AUTO: 4.3 K/UL (ref 1.8–7.7)
PLATELET # BLD AUTO: 238 K/UL (ref 150–450)
PMV BLD AUTO: 10.2 FL (ref 9.2–12.9)
POTASSIUM SERPL-SCNC: 4.9 MMOL/L (ref 3.5–5.1)
PROT SERPL-MCNC: 7.6 G/DL (ref 6–8.4)
RBC # BLD AUTO: 4.71 M/UL (ref 4.6–6.2)
SODIUM SERPL-SCNC: 141 MMOL/L (ref 136–145)
WBC # BLD AUTO: 8.37 K/UL (ref 3.9–12.7)

## 2022-03-11 PROCEDURE — 85027 COMPLETE CBC AUTOMATED: CPT | Performed by: STUDENT IN AN ORGANIZED HEALTH CARE EDUCATION/TRAINING PROGRAM

## 2022-03-11 PROCEDURE — 36415 COLL VENOUS BLD VENIPUNCTURE: CPT | Performed by: STUDENT IN AN ORGANIZED HEALTH CARE EDUCATION/TRAINING PROGRAM

## 2022-03-11 PROCEDURE — 80053 COMPREHEN METABOLIC PANEL: CPT | Performed by: STUDENT IN AN ORGANIZED HEALTH CARE EDUCATION/TRAINING PROGRAM

## 2022-03-14 ENCOUNTER — INFUSION (OUTPATIENT)
Dept: INFUSION THERAPY | Facility: HOSPITAL | Age: 72
End: 2022-03-14
Payer: MEDICARE

## 2022-03-14 VITALS
RESPIRATION RATE: 18 BRPM | DIASTOLIC BLOOD PRESSURE: 71 MMHG | HEART RATE: 60 BPM | TEMPERATURE: 98 F | OXYGEN SATURATION: 98 % | SYSTOLIC BLOOD PRESSURE: 121 MMHG

## 2022-03-14 DIAGNOSIS — C78.7 METASTATIC COLON CANCER TO LIVER: Primary | ICD-10-CM

## 2022-03-14 DIAGNOSIS — C18.9 METASTATIC COLON CANCER TO LIVER: Primary | ICD-10-CM

## 2022-03-14 PROCEDURE — 25000003 PHARM REV CODE 250: Performed by: INTERNAL MEDICINE

## 2022-03-14 PROCEDURE — 63600175 PHARM REV CODE 636 W HCPCS: Performed by: INTERNAL MEDICINE

## 2022-03-14 PROCEDURE — 96375 TX/PRO/DX INJ NEW DRUG ADDON: CPT

## 2022-03-14 PROCEDURE — 96416 CHEMO PROLONG INFUSE W/PUMP: CPT

## 2022-03-14 PROCEDURE — 96413 CHEMO IV INFUSION 1 HR: CPT

## 2022-03-14 RX ORDER — DIPHENHYDRAMINE HYDROCHLORIDE 50 MG/ML
50 INJECTION INTRAMUSCULAR; INTRAVENOUS ONCE AS NEEDED
Status: DISCONTINUED | OUTPATIENT
Start: 2022-03-14 | End: 2022-03-14 | Stop reason: HOSPADM

## 2022-03-14 RX ORDER — ONDANSETRON 2 MG/ML
8 INJECTION INTRAMUSCULAR; INTRAVENOUS
Status: COMPLETED | OUTPATIENT
Start: 2022-03-14 | End: 2022-03-14

## 2022-03-14 RX ORDER — HEPARIN 100 UNIT/ML
500 SYRINGE INTRAVENOUS
Status: DISCONTINUED | OUTPATIENT
Start: 2022-03-14 | End: 2022-03-14 | Stop reason: HOSPADM

## 2022-03-14 RX ORDER — SODIUM CHLORIDE 0.9 % (FLUSH) 0.9 %
10 SYRINGE (ML) INJECTION
Status: DISCONTINUED | OUTPATIENT
Start: 2022-03-14 | End: 2022-03-14 | Stop reason: HOSPADM

## 2022-03-14 RX ORDER — EPINEPHRINE 0.3 MG/.3ML
0.3 INJECTION SUBCUTANEOUS ONCE AS NEEDED
Status: DISCONTINUED | OUTPATIENT
Start: 2022-03-14 | End: 2022-03-14 | Stop reason: HOSPADM

## 2022-03-14 RX ADMIN — BEVACIZUMAB 300 MG: 100 INJECTION, SOLUTION INTRAVENOUS at 09:03

## 2022-03-14 RX ADMIN — SODIUM CHLORIDE: 9 INJECTION, SOLUTION INTRAVENOUS at 08:03

## 2022-03-14 RX ADMIN — FLUOROURACIL 4000 MG: 50 INJECTION, SOLUTION INTRAVENOUS at 10:03

## 2022-03-14 RX ADMIN — ONDANSETRON 8 MG: 2 INJECTION, SOLUTION INTRAMUSCULAR; INTRAVENOUS at 09:03

## 2022-03-14 NOTE — PLAN OF CARE
1034-pt tolerated Avastin infusion well, no complications or side effects, POC and meds discussed with pt, 5FU CADD connected to pt, site secured, infusing w/out issue; pt instructed to remain well hydration;pt to report for pump d/c on Wed 3/16/22 at 0830am, pt aware of upcoming appts, pt knows to call MD with any questions or concerns. Pt ambulated off unit, no distress noted.

## 2022-03-16 ENCOUNTER — INFUSION (OUTPATIENT)
Dept: INFUSION THERAPY | Facility: HOSPITAL | Age: 72
End: 2022-03-16
Payer: MEDICARE

## 2022-03-16 VITALS
DIASTOLIC BLOOD PRESSURE: 78 MMHG | TEMPERATURE: 98 F | SYSTOLIC BLOOD PRESSURE: 140 MMHG | RESPIRATION RATE: 18 BRPM | HEART RATE: 67 BPM

## 2022-03-16 DIAGNOSIS — C18.9 METASTATIC COLON CANCER TO LIVER: Primary | ICD-10-CM

## 2022-03-16 DIAGNOSIS — C78.7 METASTATIC COLON CANCER TO LIVER: Primary | ICD-10-CM

## 2022-03-16 PROCEDURE — 63600175 PHARM REV CODE 636 W HCPCS: Performed by: INTERNAL MEDICINE

## 2022-03-16 PROCEDURE — A4216 STERILE WATER/SALINE, 10 ML: HCPCS | Performed by: INTERNAL MEDICINE

## 2022-03-16 PROCEDURE — 99211 OFF/OP EST MAY X REQ PHY/QHP: CPT

## 2022-03-16 PROCEDURE — 25000003 PHARM REV CODE 250: Performed by: INTERNAL MEDICINE

## 2022-03-16 RX ORDER — SODIUM CHLORIDE 0.9 % (FLUSH) 0.9 %
10 SYRINGE (ML) INJECTION
Status: DISCONTINUED | OUTPATIENT
Start: 2022-03-16 | End: 2022-03-16 | Stop reason: HOSPADM

## 2022-03-16 RX ORDER — HEPARIN 100 UNIT/ML
500 SYRINGE INTRAVENOUS
Status: DISCONTINUED | OUTPATIENT
Start: 2022-03-16 | End: 2022-03-16 | Stop reason: HOSPADM

## 2022-03-16 RX ADMIN — SODIUM CHLORIDE, PRESERVATIVE FREE 10 ML: 5 INJECTION INTRAVENOUS at 08:03

## 2022-03-16 RX ADMIN — HEPARIN 500 UNITS: 100 SYRINGE at 08:03

## 2022-03-22 ENCOUNTER — HOSPITAL ENCOUNTER (OUTPATIENT)
Dept: RADIOLOGY | Facility: HOSPITAL | Age: 72
Discharge: HOME | End: 2022-03-22
Attending: STUDENT IN AN ORGANIZED HEALTH CARE EDUCATION/TRAINING PROGRAM
Payer: MEDICARE

## 2022-03-22 DIAGNOSIS — C78.7 METASTATIC COLON CANCER TO LIVER: ICD-10-CM

## 2022-03-22 DIAGNOSIS — C18.9 METASTATIC COLON CANCER TO LIVER: ICD-10-CM

## 2022-03-22 PROCEDURE — 74177 CT ABD & PELVIS W/CONTRAST: CPT | Mod: TC

## 2022-03-22 PROCEDURE — 74177 CT ABD & PELVIS W/CONTRAST: CPT | Mod: 26,,, | Performed by: RADIOLOGY

## 2022-03-22 PROCEDURE — 25500020 PHARM REV CODE 255: Performed by: STUDENT IN AN ORGANIZED HEALTH CARE EDUCATION/TRAINING PROGRAM

## 2022-03-22 PROCEDURE — 74177 CT CHEST ABDOMEN PELVIS WITH CONTRAST (XPD): ICD-10-PCS | Mod: 26,,, | Performed by: RADIOLOGY

## 2022-03-22 PROCEDURE — 71260 CT CHEST ABDOMEN PELVIS WITH CONTRAST (XPD): ICD-10-PCS | Mod: 26,,, | Performed by: RADIOLOGY

## 2022-03-22 PROCEDURE — 71260 CT THORAX DX C+: CPT | Mod: TC

## 2022-03-22 PROCEDURE — 71260 CT THORAX DX C+: CPT | Mod: 26,,, | Performed by: RADIOLOGY

## 2022-03-22 RX ADMIN — IOHEXOL 75 ML: 350 INJECTION, SOLUTION INTRAVENOUS at 03:03

## 2022-03-23 NOTE — PROGRESS NOTES
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 Downs is a 71 y.o. male who returns following hospital discharge.  70 years old pleasant AA gentleman, with history of hypertension, presenting with two weeks duration of abdominal cramps, diarrhea, nausea and vomiting. His symptoms started gradually with intermittent generalize crampy abdominal pain, worse with food. That was associated with nausea, reflux and occaisonal vomiting. He tried pepto-bismol and imodium with no relief, and hence he presented to ER.   He lost significant amount of weight, but cannot quantify the amount or the time period. He has also been feeling weaker than usual.   He hasn't seen a healthcare provider in over than 10 years. He has never had a colonoscopy.   In the ER. His labs were significant of microcytic anemia of 9.7 g/dl, MCV 77, and Platelets counts of 495. CT of the abdomen and pelvis showed " Large mass in the transverse colon highly suspicious for adenocarcinoma resulting in at least high-grade partial obstruction with dilation of proximal colon and small bowel. Soft tissue nodules in the adjacent mesentery are suspicious for pily metastases and/or mesenteric implants.  Numerous hepatic masses measuring up to 11.2 cm most likely related to metastatic disease.  There " "also several small subcapsular lesions which could reflect additional metastatic disease"   CT chest was negative to metastatic disease.   He underwent colonoscopy and stent placement. 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 HER 2 negative, VIRI/SHERI NGS was cancelled due to insufficient quantity   Guardant 360 was sent, negative for KRAS, NRAS, BRAF     He started palliative chemotherapy with FOLFOX+Pema     Restaging scans on 07/22/2021 showed significant decrease in size trasverse colon mass as well liver metastatic lesion.     He was switched to maintenance 5FU+Pema from C8    Restaging scans from 10/05/2021 (after C11) showing stable-mildly improved liver mets.   Restaging scans from 12/28/2021 (after C17) showing stable disease.     Interval History:   He presents today with his daughter prior to cycle 24   He is tolerating treatment with no issues. He is feeling overall well. He denies nausea, vomiting, constipation. He has good appetite.  Restaging scans from 03/22 show stable disease.       Past Medical History:   Past Medical History:   Diagnosis Date    Hypertension     Metastatic colon cancer to liver 4/22/2021       Past Surgical HIstory:   Past Surgical History:   Procedure Laterality Date    COLONOSCOPY N/A 4/20/2021    Procedure: COLONOSCOPY with possible stent;  Surgeon: EDILMA Bonilla MD;  Location: [facility] ([address] [place] [address]);  Service: Colon and Rectal;  Laterality: N/A;    INSERTION OF TUNNELED CENTRAL VENOUS CATHETER (CVC) WITH SUBCUTANEOUS PORT N/A 5/3/2021    Procedure: [id number];  Surgeon: Francisco Layton MD;  Location: [facility] OR [address] [place] [address];  Service: Vascular;  Laterality: N/A;       Family History: No family history on file.    Social History:  reports that he has never smoked. He has never used smokeless tobacco. He reports current " alcohol use.    Allergies:  Review of patient's allergies indicates:  No Known Allergies    Medications:  Current Outpatient Medications   Medication Sig Dispense Refill    acetaminophen (TYLENOL) 500 MG tablet Take 1 tablet (500 mg total) by mouth every 6 (six) hours as needed for Pain (alternate with ibuprofen). (Patient not taking: No sig reported)  0    amLODIPine (NORVASC) 10 MG tablet Take 1 tablet (10 mg total) by mouth once daily. 30 tablet 3    ibuprofen (ADVIL,MOTRIN) 400 MG tablet Take 1 tablet (400 mg total) by mouth every 6 (six) hours as needed for Other (pain). Alternate with tylenol (Patient not taking: No sig reported)      LIDOcaine-prilocaine (EMLA) cream Apply topically as needed (Apply one hour before treatment). 30 g 5    ondansetron (ZOFRAN) 4 MG tablet Take 1 tablet (4 mg total) by mouth every 8 (eight) hours as needed for Nausea. (Patient not taking: No sig reported) 30 tablet 3     Current Facility-Administered Medications   Medication Dose Route Frequency Provider Last Rate Last Admin    sars-cov-2 (covid-19) 30 mcg/0.3 ml injection 0.3 mL  0.3 mL Intramuscular 1 time in Clinic/HOD Idania Rock LPN           Review of Systems   Constitutional: Negative for appetite change, fatigue, fever and unexpected weight change.   HENT: Negative for congestion and nosebleeds.    Eyes: Negative for pain and visual disturbance.   Respiratory: Negative for cough, chest tightness, shortness of breath and wheezing.    Cardiovascular: Negative for chest pain, palpitations and leg swelling.   Gastrointestinal: Negative for abdominal pain, blood in stool, constipation, diarrhea, nausea and vomiting.   Genitourinary: Negative for difficulty urinating, flank pain, frequency, hematuria and urgency.   Musculoskeletal: Negative for arthralgias, back pain and myalgias.   Neurological: Negative for dizziness, weakness, numbness and headaches.   Psychiatric/Behavioral: The patient is not nervous/anxious.   "      ECOG Performance Status: 1   Objective:      Vitals:   Vitals:    03/24/22 1117   BP: (!) 152/82   BP Location: Left arm   Patient Position: Sitting   BP Method: Medium (Automatic)   Pulse: (!) 57   Resp: 20   SpO2: 96%   Weight: 63.1 kg (139 lb 1.8 oz)   Height: 5' 7" (1.702 m)     Physical Exam  Constitutional:       General: He is not in acute distress.     Appearance: Normal appearance.   HENT:      Head: Normocephalic and atraumatic.   Eyes:      Pupils: Pupils are equal, round, and reactive to light.   Cardiovascular:      Rate and Rhythm: Normal rate and regular rhythm.      Heart sounds: No murmur heard.  Pulmonary:      Effort: No respiratory distress.      Breath sounds: Normal breath sounds. No wheezing.   Abdominal:      General: Abdomen is flat. Bowel sounds are normal. There is no distension.      Palpations: Abdomen is soft. There is no mass.      Tenderness: There is no abdominal tenderness.   Musculoskeletal:         General: No swelling or deformity.   Skin:     Coloration: Skin is not jaundiced.   Neurological:      General: No focal deficit present.      Mental Status: He is alert and oriented to person, place, and time. Mental status is at baseline.         Laboratory Data:  Lab Visit on 03/24/2022   Component Date Value Ref Range Status    WBC 03/24/2022 7.50  3.90 - 12.70 K/uL Final    RBC 03/24/2022 4.80  4.60 - 6.20 M/uL Final    Hemoglobin 03/24/2022 14.0  14.0 - 18.0 g/dL Final    Hematocrit 03/24/2022 46.3  40.0 - 54.0 % Final    MCV 03/24/2022 97  82 - 98 fL Final    MCH 03/24/2022 29.2  27.0 - 31.0 pg Final    MCHC 03/24/2022 30.2 (A) 32.0 - 36.0 g/dL Final    RDW 03/24/2022 14.8 (A) 11.5 - 14.5 % Final    Platelets 03/24/2022 284  150 - 450 K/uL Final    MPV 03/24/2022 10.6  9.2 - 12.9 fL Final    Gran # (ANC) 03/24/2022 3.7  1.8 - 7.7 K/uL Final    Comment: The ANC is based on a white cell differential from an   automated cell counter. It has not been microscopically "   reviewed for the presence of abnormal cells. Clinical   correlation is required.      Immature Grans (Abs) 03/24/2022 0.02  0.00 - 0.04 K/uL Final    Comment: Mild elevation in immature granulocytes is non specific and   can be seen in a variety of conditions including stress response,   acute inflammation, trauma and pregnancy. Correlation with other   laboratory and clinical findings is essential.      Sodium 03/24/2022 139  136 - 145 mmol/L Final    Potassium 03/24/2022 4.2  3.5 - 5.1 mmol/L Final    Chloride 03/24/2022 103  95 - 110 mmol/L Final    CO2 03/24/2022 29  23 - 29 mmol/L Final    Glucose 03/24/2022 91  70 - 110 mg/dL Final    BUN 03/24/2022 12  8 - 23 mg/dL Final    Creatinine 03/24/2022 1.1  0.5 - 1.4 mg/dL Final    Calcium 03/24/2022 9.7  8.7 - 10.5 mg/dL Final    Total Protein 03/24/2022 7.5  6.0 - 8.4 g/dL Final    Albumin 03/24/2022 3.2 (A) 3.5 - 5.2 g/dL Final    Total Bilirubin 03/24/2022 0.5  0.1 - 1.0 mg/dL Final    Comment: For infants and newborns, interpretation of results should be based  on gestational age, weight and in agreement with clinical  observations.    Premature Infant recommended reference ranges:  Up to 24 hours.............<8.0 mg/dL  Up to 48 hours............<12.0 mg/dL  3-5 days..................<15.0 mg/dL  6-29 days.................<15.0 mg/dL      Alkaline Phosphatase 03/24/2022 81  55 - 135 U/L Final    AST 03/24/2022 21  10 - 40 U/L Final    ALT 03/24/2022 12  10 - 44 U/L Final    Anion Gap 03/24/2022 7 (A) 8 - 16 mmol/L Final    eGFR if African American 03/24/2022 >60.0  >60 mL/min/1.73 m^2 Final    eGFR if non African American 03/24/2022 >60.0  >60 mL/min/1.73 m^2 Final    Comment: Calculation used to obtain the estimated glomerular filtration  rate (eGFR) is the CKD-EPI equation.       CEA 03/24/2022 2.8  0.0 - 5.0 ng/mL Final    Comment: CEA Normal Range:  Non-Smokers: 0-3.0 ng/mL  Smokers:     0-5.0 ng/mL         CT abdomen pelvis:  Three  hypodense heterogeneous liver masses are detected.  The largest is centered in the right hepatic lobe and measures 11.1 x 9.9 x 9.8 cm.  There are also several low attenuation subcapsular lesions measuring up to 4.3 cm at the dome of the liver (601:67) which could be due to metastatic disease, subcapsular hematoma, or less likely subcapsular infectious process.  The gallbladder is nondistended.  The portosplenic confluence is distended with a large filling defect which could reflect bland thrombus or tumor thrombus measuring 2.2 cm.  This extends into the superior mesenteric vein which is also distended.     The pancreas, spleen, and adrenal glands have a normal appearance.     The kidneys demonstrate normal cortical thickness without hydronephrosis.     There is a soft tissue mass in the transverse colon measuring 4.5 x 4.2 x 3.6 cm.  This is highly suspicious for adenocarcinoma and results in at least high-grade partial colonic obstruction with dilated proximal colon as well as dilated small bowel loops.  Adjacent soft tissue nodules are noted in the mesentery in this region likely reflecting local pily metastases and or mesenteric implants.  The largest discrete     Areas of wall thickening are noted in the distal small bowel and cecum which could be related to venous obstruction at the level of the superior mesenteric vein thrombus.  Scattered colonic diverticula are present without diverticulitis.  There is scattered mild ascites.     The bladder is nondistended and not well evaluated.  The prostate appears mildly enlarged.     No osseous abnormalities identified.     CT chest abdomen pelvis 07/22  Impression:     1. Significant decrease in size of previously identified transverse colon obstructing mass.  The dominant hepatic lesion and presumed portosplenic thrombus have also decreased in size suggesting favorable sponsored therapy.  However, there is an indeterminate left hepatic dome lesion which appears  more conspicuous.  2. Interval placement of a transverse colon stent with resolution of prior bowel obstruction.  3. Sigmoid diverticulosis with diffusely thickened appearance of the proximal sigmoid, possibly related to incomplete distension.  No surrounding findings of diverticulitis.  Correlation with colonoscopy as clinically warranted.    CT C/A/P 10/05/2021  There is decrease in size of right hepatic lobe lesion with cyst stable size of left hepatic lobe lesion.  Additional stable subcentimeter hypodensities with no evidence of any lesions.     Continued decreased size of presumed thrombus at the portal splenic confluence.     Stent remains in the transverse colon with continued decreased conspicuity of previously described mass.    CT CAP 12/28/21  Patient with known metastatic colon cancer.  Hepatic lesions appears similar, measurements as above.      Assessment and Plan        1. Metastatic colon cancer to liver    2. Primary hypertension      1.  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  Guardant 360 was negative for BRAF, VIRI, HER2 amplification.   Pretreatment CEA 57.  We had a long and sonia discussion with him about his diagnosis. Unfortunately, the disease is not curable but remains treatable and he has good performance status.   Restaging scans after 7 cycles showed significant reduction in colonic mass and liver mass.   we switched to maintenance as of cycle 8 with 5FU + Pema  Restaging scans from March 2022 show stable disease.   CEA 57.8 (pretreatment) -> Today 2.8    Labs reviewed from today, proceed with cycle 24  Restaging scans in three months (06/2022)    2. Hypertension   Controlled       Follow-up:   CBC,CMP and chemo chair for 5FU+Pema infusion in two weeks   See me in four weeks 03/24 with CBC,CMP,CEA and chemo chair for  5FU+Pema infusion       The above is discussed and staffed with Dr. Landen Contreras MD  [id number]  Hematology/Oncology  fellow

## 2022-03-24 ENCOUNTER — INFUSION (OUTPATIENT)
Dept: INFUSION THERAPY | Facility: HOSPITAL | Age: 72
End: 2022-03-24
Payer: MEDICARE

## 2022-03-24 ENCOUNTER — OFFICE VISIT (OUTPATIENT)
Dept: HEMATOLOGY/ONCOLOGY | Facility: CLINIC | Age: 72
End: 2022-03-24
Payer: MEDICARE

## 2022-03-24 VITALS
HEART RATE: 59 BPM | OXYGEN SATURATION: 98 % | RESPIRATION RATE: 16 BRPM | DIASTOLIC BLOOD PRESSURE: 78 MMHG | TEMPERATURE: 98 F | SYSTOLIC BLOOD PRESSURE: 131 MMHG

## 2022-03-24 VITALS
WEIGHT: 139.13 LBS | OXYGEN SATURATION: 96 % | BODY MASS INDEX: 21.84 KG/M2 | DIASTOLIC BLOOD PRESSURE: 82 MMHG | SYSTOLIC BLOOD PRESSURE: 152 MMHG | HEIGHT: 67 IN | HEART RATE: 57 BPM | RESPIRATION RATE: 20 BRPM

## 2022-03-24 DIAGNOSIS — C78.7 METASTATIC COLON CANCER TO LIVER: Primary | ICD-10-CM

## 2022-03-24 DIAGNOSIS — I10 PRIMARY HYPERTENSION: ICD-10-CM

## 2022-03-24 DIAGNOSIS — C18.9 METASTATIC COLON CANCER TO LIVER: Primary | ICD-10-CM

## 2022-03-24 LAB
BILIRUB UR QL STRIP: NEGATIVE
CLARITY UR REFRACT.AUTO: CLEAR
COLOR UR AUTO: YELLOW
GLUCOSE UR QL STRIP: NEGATIVE
HGB UR QL STRIP: NEGATIVE
KETONES UR QL STRIP: NEGATIVE
LEUKOCYTE ESTERASE UR QL STRIP: NEGATIVE
NITRITE UR QL STRIP: NEGATIVE
PH UR STRIP: 6 [PH] (ref 5–8)
PROT UR QL STRIP: NEGATIVE
SP GR UR STRIP: 1.02 (ref 1–1.03)
URN SPEC COLLECT METH UR: NORMAL

## 2022-03-24 PROCEDURE — 99999 PR PBB SHADOW E&M-EST. PATIENT-LVL III: ICD-10-PCS | Mod: PBBFAC,GC,, | Performed by: STUDENT IN AN ORGANIZED HEALTH CARE EDUCATION/TRAINING PROGRAM

## 2022-03-24 PROCEDURE — 99215 OFFICE O/P EST HI 40 MIN: CPT | Mod: S$PBB,GC,, | Performed by: STUDENT IN AN ORGANIZED HEALTH CARE EDUCATION/TRAINING PROGRAM

## 2022-03-24 PROCEDURE — 96375 TX/PRO/DX INJ NEW DRUG ADDON: CPT

## 2022-03-24 PROCEDURE — 81003 URINALYSIS AUTO W/O SCOPE: CPT | Performed by: STUDENT IN AN ORGANIZED HEALTH CARE EDUCATION/TRAINING PROGRAM

## 2022-03-24 PROCEDURE — 96416 CHEMO PROLONG INFUSE W/PUMP: CPT

## 2022-03-24 PROCEDURE — 96413 CHEMO IV INFUSION 1 HR: CPT

## 2022-03-24 PROCEDURE — 99215 PR OFFICE/OUTPT VISIT, EST, LEVL V, 40-54 MIN: ICD-10-PCS | Mod: S$PBB,GC,, | Performed by: STUDENT IN AN ORGANIZED HEALTH CARE EDUCATION/TRAINING PROGRAM

## 2022-03-24 PROCEDURE — 63600175 PHARM REV CODE 636 W HCPCS: Performed by: INTERNAL MEDICINE

## 2022-03-24 PROCEDURE — 99213 OFFICE O/P EST LOW 20 MIN: CPT | Mod: PBBFAC,25 | Performed by: STUDENT IN AN ORGANIZED HEALTH CARE EDUCATION/TRAINING PROGRAM

## 2022-03-24 PROCEDURE — 99999 PR PBB SHADOW E&M-EST. PATIENT-LVL III: CPT | Mod: PBBFAC,GC,, | Performed by: STUDENT IN AN ORGANIZED HEALTH CARE EDUCATION/TRAINING PROGRAM

## 2022-03-24 PROCEDURE — 25000003 PHARM REV CODE 250: Performed by: INTERNAL MEDICINE

## 2022-03-24 RX ORDER — DIPHENHYDRAMINE HYDROCHLORIDE 50 MG/ML
50 INJECTION INTRAMUSCULAR; INTRAVENOUS ONCE AS NEEDED
Status: CANCELLED | OUTPATIENT
Start: 2022-03-24

## 2022-03-24 RX ORDER — HEPARIN 100 UNIT/ML
500 SYRINGE INTRAVENOUS
Status: CANCELLED | OUTPATIENT
Start: 2022-03-24

## 2022-03-24 RX ORDER — EPINEPHRINE 0.3 MG/.3ML
0.3 INJECTION SUBCUTANEOUS ONCE AS NEEDED
Status: CANCELLED | OUTPATIENT
Start: 2022-03-24

## 2022-03-24 RX ORDER — ONDANSETRON 2 MG/ML
8 INJECTION INTRAMUSCULAR; INTRAVENOUS
Status: CANCELLED | OUTPATIENT
Start: 2022-03-24

## 2022-03-24 RX ORDER — SODIUM CHLORIDE 0.9 % (FLUSH) 0.9 %
10 SYRINGE (ML) INJECTION
Status: CANCELLED | OUTPATIENT
Start: 2022-03-26

## 2022-03-24 RX ORDER — EPINEPHRINE 0.3 MG/.3ML
0.3 INJECTION SUBCUTANEOUS ONCE AS NEEDED
Status: DISCONTINUED | OUTPATIENT
Start: 2022-03-24 | End: 2022-03-24 | Stop reason: HOSPADM

## 2022-03-24 RX ORDER — HEPARIN 100 UNIT/ML
500 SYRINGE INTRAVENOUS
Status: CANCELLED | OUTPATIENT
Start: 2022-03-26

## 2022-03-24 RX ORDER — SODIUM CHLORIDE 0.9 % (FLUSH) 0.9 %
10 SYRINGE (ML) INJECTION
Status: CANCELLED | OUTPATIENT
Start: 2022-03-24

## 2022-03-24 RX ORDER — DIPHENHYDRAMINE HYDROCHLORIDE 50 MG/ML
50 INJECTION INTRAMUSCULAR; INTRAVENOUS ONCE AS NEEDED
Status: DISCONTINUED | OUTPATIENT
Start: 2022-03-24 | End: 2022-03-24 | Stop reason: HOSPADM

## 2022-03-24 RX ORDER — ONDANSETRON 2 MG/ML
8 INJECTION INTRAMUSCULAR; INTRAVENOUS
Status: COMPLETED | OUTPATIENT
Start: 2022-03-24 | End: 2022-03-24

## 2022-03-24 RX ADMIN — SODIUM CHLORIDE: 9 INJECTION, SOLUTION INTRAVENOUS at 12:03

## 2022-03-24 RX ADMIN — BEVACIZUMAB 300 MG: 100 INJECTION, SOLUTION INTRAVENOUS at 01:03

## 2022-03-24 RX ADMIN — FLUOROURACIL 4000 MG: 50 INJECTION, SOLUTION INTRAVENOUS at 02:03

## 2022-03-24 RX ADMIN — ONDANSETRON 8 MG: 2 INJECTION, SOLUTION INTRAMUSCULAR; INTRAVENOUS at 12:03

## 2022-03-24 NOTE — PLAN OF CARE
Pt here for Avastin and 5 FU pump.  Assessment complete and labs reviewed.  VSS.  Pt tolerated Avastin well, VSS 10 min post infusion.  Placed pt on 5FU CADD pump, pump infusing without difficulty prior to dc.  No questions or concerns. Pt to RTC Saturday for pump d/c.  Pt ambulated out of unit unassisted.

## 2022-03-24 NOTE — Clinical Note
CBC,CMP and chemo chair for 5FU+Pema infusion in two weeks 04/07, pump disconnect on 04/09 See me in four weeks 04/21 with CBC,CMP,CEA and chemo chair for  5FU+Pema infusion, pump disconnect on 04/23

## 2022-03-26 ENCOUNTER — INFUSION (OUTPATIENT)
Dept: INFUSION THERAPY | Facility: HOSPITAL | Age: 72
End: 2022-03-26
Attending: STUDENT IN AN ORGANIZED HEALTH CARE EDUCATION/TRAINING PROGRAM
Payer: MEDICARE

## 2022-03-26 VITALS
RESPIRATION RATE: 18 BRPM | SYSTOLIC BLOOD PRESSURE: 137 MMHG | DIASTOLIC BLOOD PRESSURE: 83 MMHG | TEMPERATURE: 98 F | WEIGHT: 139.13 LBS | HEIGHT: 67 IN | BODY MASS INDEX: 21.84 KG/M2 | HEART RATE: 76 BPM

## 2022-03-26 DIAGNOSIS — C78.7 METASTATIC COLON CANCER TO LIVER: Primary | ICD-10-CM

## 2022-03-26 DIAGNOSIS — C18.9 METASTATIC COLON CANCER TO LIVER: Primary | ICD-10-CM

## 2022-03-26 PROCEDURE — 25000003 PHARM REV CODE 250: Performed by: INTERNAL MEDICINE

## 2022-03-26 PROCEDURE — A4216 STERILE WATER/SALINE, 10 ML: HCPCS | Performed by: INTERNAL MEDICINE

## 2022-03-26 PROCEDURE — 63600175 PHARM REV CODE 636 W HCPCS: Performed by: INTERNAL MEDICINE

## 2022-03-26 RX ORDER — SODIUM CHLORIDE 0.9 % (FLUSH) 0.9 %
10 SYRINGE (ML) INJECTION
Status: DISCONTINUED | OUTPATIENT
Start: 2022-03-26 | End: 2022-03-26 | Stop reason: HOSPADM

## 2022-03-26 RX ORDER — HEPARIN 100 UNIT/ML
500 SYRINGE INTRAVENOUS
Status: DISCONTINUED | OUTPATIENT
Start: 2022-03-26 | End: 2022-03-26 | Stop reason: HOSPADM

## 2022-03-26 RX ADMIN — Medication 10 ML: at 11:03

## 2022-03-26 RX ADMIN — HEPARIN 500 UNITS: 100 SYRINGE at 11:03

## 2022-04-07 ENCOUNTER — LAB VISIT (OUTPATIENT)
Dept: LAB | Facility: HOSPITAL | Age: 72
End: 2022-04-07
Payer: MEDICARE

## 2022-04-07 ENCOUNTER — INFUSION (OUTPATIENT)
Dept: INFUSION THERAPY | Facility: HOSPITAL | Age: 72
End: 2022-04-07
Payer: MEDICARE

## 2022-04-07 VITALS
RESPIRATION RATE: 16 BRPM | SYSTOLIC BLOOD PRESSURE: 124 MMHG | DIASTOLIC BLOOD PRESSURE: 82 MMHG | TEMPERATURE: 98 F | HEART RATE: 61 BPM

## 2022-04-07 DIAGNOSIS — C78.7 METASTATIC COLON CANCER TO LIVER: ICD-10-CM

## 2022-04-07 DIAGNOSIS — C78.7 METASTATIC COLON CANCER TO LIVER: Primary | ICD-10-CM

## 2022-04-07 DIAGNOSIS — C18.9 METASTATIC COLON CANCER TO LIVER: Primary | ICD-10-CM

## 2022-04-07 DIAGNOSIS — C18.9 METASTATIC COLON CANCER TO LIVER: ICD-10-CM

## 2022-04-07 LAB
ALBUMIN SERPL BCP-MCNC: 3.4 G/DL (ref 3.5–5.2)
ALBUMIN SERPL BCP-MCNC: 3.4 G/DL (ref 3.5–5.2)
ALP SERPL-CCNC: 78 U/L (ref 55–135)
ALP SERPL-CCNC: 78 U/L (ref 55–135)
ALT SERPL W/O P-5'-P-CCNC: 11 U/L (ref 10–44)
ALT SERPL W/O P-5'-P-CCNC: 11 U/L (ref 10–44)
ANION GAP SERPL CALC-SCNC: 9 MMOL/L (ref 8–16)
ANION GAP SERPL CALC-SCNC: 9 MMOL/L (ref 8–16)
AST SERPL-CCNC: 22 U/L (ref 10–40)
AST SERPL-CCNC: 22 U/L (ref 10–40)
BASOPHILS # BLD AUTO: 0.05 K/UL (ref 0–0.2)
BASOPHILS NFR BLD: 0.6 % (ref 0–1.9)
BILIRUB SERPL-MCNC: 1 MG/DL (ref 0.1–1)
BILIRUB SERPL-MCNC: 1 MG/DL (ref 0.1–1)
BUN SERPL-MCNC: 15 MG/DL (ref 8–23)
BUN SERPL-MCNC: 15 MG/DL (ref 8–23)
CALCIUM SERPL-MCNC: 9.4 MG/DL (ref 8.7–10.5)
CALCIUM SERPL-MCNC: 9.4 MG/DL (ref 8.7–10.5)
CHLORIDE SERPL-SCNC: 104 MMOL/L (ref 95–110)
CHLORIDE SERPL-SCNC: 104 MMOL/L (ref 95–110)
CO2 SERPL-SCNC: 26 MMOL/L (ref 23–29)
CO2 SERPL-SCNC: 26 MMOL/L (ref 23–29)
CREAT SERPL-MCNC: 1.1 MG/DL (ref 0.5–1.4)
CREAT SERPL-MCNC: 1.1 MG/DL (ref 0.5–1.4)
DIFFERENTIAL METHOD: ABNORMAL
EOSINOPHIL # BLD AUTO: 0.3 K/UL (ref 0–0.5)
EOSINOPHIL NFR BLD: 3 % (ref 0–8)
ERYTHROCYTE [DISTWIDTH] IN BLOOD BY AUTOMATED COUNT: 15 % (ref 11.5–14.5)
EST. GFR  (AFRICAN AMERICAN): >60 ML/MIN/1.73 M^2
EST. GFR  (AFRICAN AMERICAN): >60 ML/MIN/1.73 M^2
EST. GFR  (NON AFRICAN AMERICAN): >60 ML/MIN/1.73 M^2
EST. GFR  (NON AFRICAN AMERICAN): >60 ML/MIN/1.73 M^2
GLUCOSE SERPL-MCNC: 104 MG/DL (ref 70–110)
GLUCOSE SERPL-MCNC: 104 MG/DL (ref 70–110)
HCT VFR BLD AUTO: 39.9 % (ref 40–54)
HGB BLD-MCNC: 12.7 G/DL (ref 14–18)
IMM GRANULOCYTES # BLD AUTO: 0.02 K/UL (ref 0–0.04)
IMM GRANULOCYTES NFR BLD AUTO: 0.2 % (ref 0–0.5)
LYMPHOCYTES # BLD AUTO: 2.9 K/UL (ref 1–4.8)
LYMPHOCYTES NFR BLD: 34.4 % (ref 18–48)
MCH RBC QN AUTO: 29.7 PG (ref 27–31)
MCHC RBC AUTO-ENTMCNC: 31.8 G/DL (ref 32–36)
MCV RBC AUTO: 93 FL (ref 82–98)
MONOCYTES # BLD AUTO: 1 K/UL (ref 0.3–1)
MONOCYTES NFR BLD: 12.3 % (ref 4–15)
NEUTROPHILS # BLD AUTO: 4.1 K/UL (ref 1.8–7.7)
NEUTROPHILS NFR BLD: 49.5 % (ref 38–73)
NRBC BLD-RTO: 0 /100 WBC
PLATELET # BLD AUTO: 287 K/UL (ref 150–450)
PMV BLD AUTO: 10.3 FL (ref 9.2–12.9)
POTASSIUM SERPL-SCNC: 4.2 MMOL/L (ref 3.5–5.1)
POTASSIUM SERPL-SCNC: 4.2 MMOL/L (ref 3.5–5.1)
PROT SERPL-MCNC: 7.2 G/DL (ref 6–8.4)
PROT SERPL-MCNC: 7.2 G/DL (ref 6–8.4)
RBC # BLD AUTO: 4.28 M/UL (ref 4.6–6.2)
SODIUM SERPL-SCNC: 139 MMOL/L (ref 136–145)
SODIUM SERPL-SCNC: 139 MMOL/L (ref 136–145)
WBC # BLD AUTO: 8.28 K/UL (ref 3.9–12.7)

## 2022-04-07 PROCEDURE — 96375 TX/PRO/DX INJ NEW DRUG ADDON: CPT

## 2022-04-07 PROCEDURE — 63600175 PHARM REV CODE 636 W HCPCS: Performed by: INTERNAL MEDICINE

## 2022-04-07 PROCEDURE — 85025 COMPLETE CBC W/AUTO DIFF WBC: CPT | Performed by: STUDENT IN AN ORGANIZED HEALTH CARE EDUCATION/TRAINING PROGRAM

## 2022-04-07 PROCEDURE — 25000003 PHARM REV CODE 250: Performed by: INTERNAL MEDICINE

## 2022-04-07 PROCEDURE — 96416 CHEMO PROLONG INFUSE W/PUMP: CPT

## 2022-04-07 PROCEDURE — 96413 CHEMO IV INFUSION 1 HR: CPT

## 2022-04-07 PROCEDURE — 80053 COMPREHEN METABOLIC PANEL: CPT | Performed by: STUDENT IN AN ORGANIZED HEALTH CARE EDUCATION/TRAINING PROGRAM

## 2022-04-07 PROCEDURE — 36415 COLL VENOUS BLD VENIPUNCTURE: CPT | Performed by: STUDENT IN AN ORGANIZED HEALTH CARE EDUCATION/TRAINING PROGRAM

## 2022-04-07 RX ORDER — SODIUM CHLORIDE 0.9 % (FLUSH) 0.9 %
10 SYRINGE (ML) INJECTION
Status: DISCONTINUED | OUTPATIENT
Start: 2022-04-07 | End: 2022-04-07 | Stop reason: HOSPADM

## 2022-04-07 RX ORDER — SODIUM CHLORIDE 0.9 % (FLUSH) 0.9 %
10 SYRINGE (ML) INJECTION
Status: CANCELLED | OUTPATIENT
Start: 2022-04-09

## 2022-04-07 RX ORDER — DIPHENHYDRAMINE HYDROCHLORIDE 50 MG/ML
50 INJECTION INTRAMUSCULAR; INTRAVENOUS ONCE AS NEEDED
Status: DISCONTINUED | OUTPATIENT
Start: 2022-04-07 | End: 2022-04-07 | Stop reason: HOSPADM

## 2022-04-07 RX ORDER — HEPARIN 100 UNIT/ML
500 SYRINGE INTRAVENOUS
Status: CANCELLED | OUTPATIENT
Start: 2022-04-09

## 2022-04-07 RX ORDER — EPINEPHRINE 0.3 MG/.3ML
0.3 INJECTION SUBCUTANEOUS ONCE AS NEEDED
Status: DISCONTINUED | OUTPATIENT
Start: 2022-04-07 | End: 2022-04-07 | Stop reason: HOSPADM

## 2022-04-07 RX ORDER — HEPARIN 100 UNIT/ML
500 SYRINGE INTRAVENOUS
Status: DISCONTINUED | OUTPATIENT
Start: 2022-04-07 | End: 2022-04-07 | Stop reason: HOSPADM

## 2022-04-07 RX ORDER — ONDANSETRON 2 MG/ML
8 INJECTION INTRAMUSCULAR; INTRAVENOUS
Status: COMPLETED | OUTPATIENT
Start: 2022-04-07 | End: 2022-04-07

## 2022-04-07 RX ADMIN — ONDANSETRON 8 MG: 2 INJECTION, SOLUTION INTRAMUSCULAR; INTRAVENOUS at 10:04

## 2022-04-07 RX ADMIN — FLUOROURACIL 4000 MG: 50 INJECTION, SOLUTION INTRAVENOUS at 12:04

## 2022-04-07 RX ADMIN — BEVACIZUMAB 300 MG: 100 INJECTION, SOLUTION INTRAVENOUS at 11:04

## 2022-04-07 RX ADMIN — SODIUM CHLORIDE: 0.9 INJECTION, SOLUTION INTRAVENOUS at 09:04

## 2022-04-07 NOTE — PLAN OF CARE
Patient tolerated Avastin/5fu well today. CADD pump infusing at 2.2 ml/hr. Settings verified by 2 RNs. Plan to rtc Sat at 10am for pump dc. NAD noted upon discharge. Discharged home, ambulated independently with daughter by side.

## 2022-04-07 NOTE — PLAN OF CARE
Problem: Adult Inpatient Plan of Care  Goal: Optimal Comfort and Wellbeing  Intervention: Provide Person-Centered Care  Flowsheets (Taken 4/7/2022 1107)  Trust Relationship/Rapport:   care explained   reassurance provided   choices provided   thoughts/feelings acknowledged   emotional support provided   empathic listening provided   questions answered   questions encouraged

## 2022-04-09 ENCOUNTER — INFUSION (OUTPATIENT)
Dept: INFUSION THERAPY | Facility: HOSPITAL | Age: 72
End: 2022-04-09
Payer: MEDICARE

## 2022-04-09 VITALS — DIASTOLIC BLOOD PRESSURE: 70 MMHG | SYSTOLIC BLOOD PRESSURE: 130 MMHG | HEART RATE: 66 BPM | RESPIRATION RATE: 18 BRPM

## 2022-04-09 DIAGNOSIS — C78.7 METASTATIC COLON CANCER TO LIVER: Primary | ICD-10-CM

## 2022-04-09 DIAGNOSIS — C18.9 METASTATIC COLON CANCER TO LIVER: Primary | ICD-10-CM

## 2022-04-09 PROCEDURE — A4216 STERILE WATER/SALINE, 10 ML: HCPCS | Performed by: INTERNAL MEDICINE

## 2022-04-09 PROCEDURE — 25000003 PHARM REV CODE 250: Performed by: INTERNAL MEDICINE

## 2022-04-09 PROCEDURE — 63600175 PHARM REV CODE 636 W HCPCS: Performed by: INTERNAL MEDICINE

## 2022-04-09 RX ORDER — SODIUM CHLORIDE 0.9 % (FLUSH) 0.9 %
10 SYRINGE (ML) INJECTION
Status: DISCONTINUED | OUTPATIENT
Start: 2022-04-09 | End: 2022-04-09 | Stop reason: HOSPADM

## 2022-04-09 RX ORDER — HEPARIN 100 UNIT/ML
500 SYRINGE INTRAVENOUS
Status: DISCONTINUED | OUTPATIENT
Start: 2022-04-09 | End: 2022-04-09 | Stop reason: HOSPADM

## 2022-04-09 RX ADMIN — Medication 10 ML: at 10:04

## 2022-04-09 RX ADMIN — HEPARIN 500 UNITS: 100 SYRINGE at 10:04

## 2022-04-21 ENCOUNTER — OFFICE VISIT (OUTPATIENT)
Dept: HEMATOLOGY/ONCOLOGY | Facility: CLINIC | Age: 72
End: 2022-04-21
Payer: MEDICARE

## 2022-04-21 ENCOUNTER — INFUSION (OUTPATIENT)
Dept: INFUSION THERAPY | Facility: HOSPITAL | Age: 72
End: 2022-04-21
Payer: MEDICARE

## 2022-04-21 VITALS
HEART RATE: 55 BPM | WEIGHT: 134.06 LBS | HEIGHT: 67 IN | DIASTOLIC BLOOD PRESSURE: 75 MMHG | RESPIRATION RATE: 18 BRPM | SYSTOLIC BLOOD PRESSURE: 123 MMHG | TEMPERATURE: 98 F | BODY MASS INDEX: 21.04 KG/M2

## 2022-04-21 VITALS
RESPIRATION RATE: 20 BRPM | HEIGHT: 67 IN | WEIGHT: 134.06 LBS | DIASTOLIC BLOOD PRESSURE: 78 MMHG | SYSTOLIC BLOOD PRESSURE: 129 MMHG | HEART RATE: 62 BPM | BODY MASS INDEX: 21.04 KG/M2 | OXYGEN SATURATION: 100 %

## 2022-04-21 DIAGNOSIS — C78.7 METASTATIC COLON CANCER TO LIVER: Primary | ICD-10-CM

## 2022-04-21 DIAGNOSIS — C18.9 METASTATIC COLON CANCER TO LIVER: Primary | ICD-10-CM

## 2022-04-21 DIAGNOSIS — I10 PRIMARY HYPERTENSION: ICD-10-CM

## 2022-04-21 DIAGNOSIS — I10 HYPERTENSION, UNSPECIFIED TYPE: ICD-10-CM

## 2022-04-21 PROCEDURE — 99999 PR PBB SHADOW E&M-EST. PATIENT-LVL III: CPT | Mod: PBBFAC,GC,, | Performed by: STUDENT IN AN ORGANIZED HEALTH CARE EDUCATION/TRAINING PROGRAM

## 2022-04-21 PROCEDURE — 99215 OFFICE O/P EST HI 40 MIN: CPT | Mod: S$PBB,GC,, | Performed by: STUDENT IN AN ORGANIZED HEALTH CARE EDUCATION/TRAINING PROGRAM

## 2022-04-21 PROCEDURE — 99213 OFFICE O/P EST LOW 20 MIN: CPT | Mod: PBBFAC,25 | Performed by: STUDENT IN AN ORGANIZED HEALTH CARE EDUCATION/TRAINING PROGRAM

## 2022-04-21 PROCEDURE — 96375 TX/PRO/DX INJ NEW DRUG ADDON: CPT

## 2022-04-21 PROCEDURE — 25000003 PHARM REV CODE 250: Performed by: STUDENT IN AN ORGANIZED HEALTH CARE EDUCATION/TRAINING PROGRAM

## 2022-04-21 PROCEDURE — 99999 PR PBB SHADOW E&M-EST. PATIENT-LVL III: ICD-10-PCS | Mod: PBBFAC,GC,, | Performed by: STUDENT IN AN ORGANIZED HEALTH CARE EDUCATION/TRAINING PROGRAM

## 2022-04-21 PROCEDURE — 99215 PR OFFICE/OUTPT VISIT, EST, LEVL V, 40-54 MIN: ICD-10-PCS | Mod: S$PBB,GC,, | Performed by: STUDENT IN AN ORGANIZED HEALTH CARE EDUCATION/TRAINING PROGRAM

## 2022-04-21 PROCEDURE — 96417 CHEMO IV INFUS EACH ADDL SEQ: CPT

## 2022-04-21 PROCEDURE — 96413 CHEMO IV INFUSION 1 HR: CPT

## 2022-04-21 PROCEDURE — 96416 CHEMO PROLONG INFUSE W/PUMP: CPT

## 2022-04-21 PROCEDURE — 63600175 PHARM REV CODE 636 W HCPCS: Performed by: STUDENT IN AN ORGANIZED HEALTH CARE EDUCATION/TRAINING PROGRAM

## 2022-04-21 RX ORDER — ONDANSETRON 2 MG/ML
8 INJECTION INTRAMUSCULAR; INTRAVENOUS
Status: CANCELLED | OUTPATIENT
Start: 2022-04-21

## 2022-04-21 RX ORDER — ONDANSETRON 2 MG/ML
8 INJECTION INTRAMUSCULAR; INTRAVENOUS
Status: COMPLETED | OUTPATIENT
Start: 2022-04-21 | End: 2022-04-21

## 2022-04-21 RX ORDER — DIPHENHYDRAMINE HYDROCHLORIDE 50 MG/ML
50 INJECTION INTRAMUSCULAR; INTRAVENOUS ONCE AS NEEDED
Status: CANCELLED | OUTPATIENT
Start: 2022-04-21

## 2022-04-21 RX ORDER — EPINEPHRINE 0.3 MG/.3ML
0.3 INJECTION SUBCUTANEOUS ONCE AS NEEDED
Status: CANCELLED | OUTPATIENT
Start: 2022-04-21

## 2022-04-21 RX ORDER — EPINEPHRINE 0.3 MG/.3ML
0.3 INJECTION SUBCUTANEOUS ONCE AS NEEDED
Status: DISCONTINUED | OUTPATIENT
Start: 2022-04-21 | End: 2022-04-21 | Stop reason: HOSPADM

## 2022-04-21 RX ORDER — HEPARIN 100 UNIT/ML
500 SYRINGE INTRAVENOUS
Status: DISCONTINUED | OUTPATIENT
Start: 2022-04-21 | End: 2022-04-21 | Stop reason: HOSPADM

## 2022-04-21 RX ORDER — HEPARIN 100 UNIT/ML
500 SYRINGE INTRAVENOUS
Status: CANCELLED | OUTPATIENT
Start: 2022-04-23

## 2022-04-21 RX ORDER — AMLODIPINE BESYLATE 10 MG/1
10 TABLET ORAL DAILY
Qty: 90 TABLET | Refills: 1 | Status: SHIPPED | OUTPATIENT
Start: 2022-04-21 | End: 2022-08-03 | Stop reason: SDUPTHER

## 2022-04-21 RX ORDER — HEPARIN 100 UNIT/ML
500 SYRINGE INTRAVENOUS
Status: CANCELLED | OUTPATIENT
Start: 2022-04-21

## 2022-04-21 RX ORDER — DIPHENHYDRAMINE HYDROCHLORIDE 50 MG/ML
50 INJECTION INTRAMUSCULAR; INTRAVENOUS ONCE AS NEEDED
Status: DISCONTINUED | OUTPATIENT
Start: 2022-04-21 | End: 2022-04-21 | Stop reason: HOSPADM

## 2022-04-21 RX ORDER — SODIUM CHLORIDE 0.9 % (FLUSH) 0.9 %
10 SYRINGE (ML) INJECTION
Status: DISCONTINUED | OUTPATIENT
Start: 2022-04-21 | End: 2022-04-21 | Stop reason: HOSPADM

## 2022-04-21 RX ORDER — SODIUM CHLORIDE 0.9 % (FLUSH) 0.9 %
10 SYRINGE (ML) INJECTION
Status: CANCELLED | OUTPATIENT
Start: 2022-04-21

## 2022-04-21 RX ORDER — SODIUM CHLORIDE 0.9 % (FLUSH) 0.9 %
10 SYRINGE (ML) INJECTION
Status: CANCELLED | OUTPATIENT
Start: 2022-04-23

## 2022-04-21 RX ADMIN — ONDANSETRON 8 MG: 2 INJECTION, SOLUTION INTRAMUSCULAR; INTRAVENOUS at 11:04

## 2022-04-21 RX ADMIN — SODIUM CHLORIDE: 9 INJECTION, SOLUTION INTRAVENOUS at 11:04

## 2022-04-21 RX ADMIN — BEVACIZUMAB 300 MG: 100 INJECTION, SOLUTION INTRAVENOUS at 11:04

## 2022-04-21 RX ADMIN — FLUOROURACIL 4000 MG: 50 INJECTION, SOLUTION INTRAVENOUS at 12:04

## 2022-04-21 NOTE — PLAN OF CARE
Pt tolerated Avastin/5FU CADD pump connection today. NAD. Port flushed + blood return present, flushed 5FU CADD pump infusing at a rate of 2.2ml/hr. Verified settings with 2 RNs. Infusion decreasing. Pt to RTC on Saturday at 1045 am for pump d/c. AVS given to pt. Discharged home. Ambulated independently with family.  Problem: Adult Inpatient Plan of Care  Goal: Optimal Comfort and Wellbeing  Intervention: Provide Person-Centered Care  Flowsheets (Taken 4/21/2022 1304)  Trust Relationship/Rapport:   care explained   thoughts/feelings acknowledged   choices provided   emotional support provided   empathic listening provided   questions answered   questions encouraged   reassurance provided

## 2022-04-21 NOTE — Clinical Note
CBC,CMP and chemo chair for 5FU+Pema infusion in two weeks, D3 pump disconnect   See me in four weeks 05/19 with CBC,CMP,CEA and chemo chair for  5FU+Pema infusion same day, and d3 pump disconnect

## 2022-04-21 NOTE — PROGRESS NOTES
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 Downs is a 71 y.o. male who returns following hospital discharge.  70 years old pleasant AA gentleman, with history of hypertension, presenting with two weeks duration of abdominal cramps, diarrhea, nausea and vomiting. His symptoms started gradually with intermittent generalize crampy abdominal pain, worse with food. That was associated with nausea, reflux and occaisonal vomiting. He tried pepto-bismol and imodium with no relief, and hence he presented to ER.   He lost significant amount of weight, but cannot quantify the amount or the time period. He has also been feeling weaker than usual.   He hasn't seen a healthcare provider in over than 10 years. He has never had a colonoscopy.   In the ER. His labs were significant of microcytic anemia of 9.7 g/dl, MCV 77, and Platelets counts of 495. CT of the abdomen and pelvis showed " Large mass in the transverse colon highly suspicious for adenocarcinoma resulting in at least high-grade partial obstruction with dilation of proximal colon and small bowel. Soft tissue nodules in the adjacent mesentery are suspicious for pily metastases and/or mesenteric implants.  Numerous hepatic masses measuring up to 11.2 cm most likely related to metastatic disease.  There " "also several small subcapsular lesions which could reflect additional metastatic disease"   CT chest was negative to metastatic disease.   He underwent colonoscopy and stent placement. 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 HER 2 negative, VIRI/SHERI NGS was cancelled due to insufficient quantity   Guardant 360 was sent, negative for KRAS, NRAS, BRAF     He started palliative chemotherapy with FOLFOX+Pema     Restaging scans on 07/22/2021 showed significant decrease in size trasverse colon mass as well liver metastatic lesion.   He was switched to maintenance 5FU+Pema from C8    Restaging scans from 10/05/2021 (after C11) showing stable-mildly improved liver mets.   Restaging scans from 12/28/2021 (after C17) showing stable disease.   Restaging scans from 03/22/22 show stable disease.     Interval History:   He presents today with his daughter prior to cycle 26   He is tolerating treatment with no issues. He is feeling overall well. He denies nausea, vomiting, constipation. He has good appetite.      Past Medical History:   Past Medical History:   Diagnosis Date    Hypertension     Metastatic colon cancer to liver 4/22/2021       Past Surgical HIstory:   Past Surgical History:   Procedure Laterality Date    COLONOSCOPY N/A 4/20/2021    Procedure: COLONOSCOPY with possible stent;  Surgeon: EDILMA Bonilla MD;  Location: [facility] ([address] [place] [address]);  Service: Colon and Rectal;  Laterality: N/A;    INSERTION OF TUNNELED CENTRAL VENOUS CATHETER (CVC) WITH SUBCUTANEOUS PORT N/A 5/3/2021    Procedure: [id number];  Surgeon: Francisco Layton MD;  Location: [facility] OR [address] [place] [address];  Service: Vascular;  Laterality: N/A;       Family History: History reviewed. No pertinent family history.    Social History:  reports that he has never smoked. He has never used smokeless tobacco. " He reports current alcohol use.    Allergies:  Review of patient's allergies indicates:  No Known Allergies    Medications:  Current Outpatient Medications   Medication Sig Dispense Refill    LIDOcaine-prilocaine (EMLA) cream Apply topically as needed (Apply one hour before treatment). 30 g 5    acetaminophen (TYLENOL) 500 MG tablet Take 1 tablet (500 mg total) by mouth every 6 (six) hours as needed for Pain (alternate with ibuprofen). (Patient not taking: No sig reported)  0    amLODIPine (NORVASC) 10 MG tablet Take 1 tablet (10 mg total) by mouth once daily. 90 tablet 1    ibuprofen (ADVIL,MOTRIN) 400 MG tablet Take 1 tablet (400 mg total) by mouth every 6 (six) hours as needed for Other (pain). Alternate with tylenol (Patient not taking: No sig reported)      ondansetron (ZOFRAN) 4 MG tablet Take 1 tablet (4 mg total) by mouth every 8 (eight) hours as needed for Nausea. (Patient not taking: No sig reported) 30 tablet 3     Current Facility-Administered Medications   Medication Dose Route Frequency Provider Last Rate Last Admin    sars-cov-2 (covid-19) 30 mcg/0.3 ml injection 0.3 mL  0.3 mL Intramuscular 1 time in Clinic/HOD Idania Rock LPN         Facility-Administered Medications Ordered in Other Visits   Medication Dose Route Frequency Provider Last Rate Last Admin    alteplase injection 2 mg  2 mg Intra-Catheter PRN Preet Bertrand MD        bevacizumab (AVASTIN) 300 mg in sodium chloride 0.9% 100 mL chemo infusion  300 mg Intravenous 1 time in Clinic/HOD Preet Bertrand MD        dextrose 5 % 250 mL flush bag   Intravenous 1 time in Clinic/HOD Preet Bertrand MD        diphenhydrAMINE injection 50 mg  50 mg Intravenous Once PRN Preet Bertrand MD        EPINEPHrine (EPIPEN) 0.3 mg/0.3 mL pen injection 0.3 mg  0.3 mg Intramuscular Once PRN Preet Bertrand MD        [START ON 4/23/2022] fluorouraciL 4,000 mg in sodium chloride 0.9% 100 mL chemo infusion  4,000 mg Intravenous over 46 hr Preet Bertrand MD         "heparin, porcine (PF) 100 unit/mL injection flush 500 Units  500 Units Intravenous PRN Preet Bertrand MD        hydrocortisone sodium succinate injection 100 mg  100 mg Intravenous Once PRN Preet Bertrand MD        sodium chloride 0.9% 100 mL flush bag   Intravenous 1 time in Clinic/HOD Preet Bertrand MD        sodium chloride 0.9% flush 10 mL  10 mL Intravenous PRN Preet Bertrand MD           Review of Systems   Constitutional: Negative for appetite change, fatigue, fever and unexpected weight change.   HENT: Negative for congestion and nosebleeds.    Eyes: Negative for pain and visual disturbance.   Respiratory: Negative for cough, chest tightness, shortness of breath and wheezing.    Cardiovascular: Negative for chest pain, palpitations and leg swelling.   Gastrointestinal: Negative for abdominal pain, blood in stool, constipation, diarrhea, nausea and vomiting.   Genitourinary: Negative for difficulty urinating, flank pain, frequency, hematuria and urgency.   Musculoskeletal: Negative for arthralgias, back pain and myalgias.   Neurological: Negative for dizziness, weakness, numbness and headaches.   Psychiatric/Behavioral: The patient is not nervous/anxious.        ECOG Performance Status: 1   Objective:      Vitals:   Vitals:    04/21/22 1006   BP: 129/78   BP Location: Right arm   Patient Position: Sitting   BP Method: Medium (Automatic)   Pulse: 62   Resp: 20   SpO2: 100%   Weight: 60.8 kg (134 lb 0.6 oz)   Height: 5' 7" (1.702 m)     Physical Exam  Constitutional:       General: He is not in acute distress.     Appearance: Normal appearance.   HENT:      Head: Normocephalic and atraumatic.   Eyes:      Pupils: Pupils are equal, round, and reactive to light.   Cardiovascular:      Rate and Rhythm: Normal rate and regular rhythm.      Heart sounds: No murmur heard.  Pulmonary:      Effort: No respiratory distress.      Breath sounds: Normal breath sounds. No wheezing.   Abdominal:      General: Abdomen is flat. Bowel " sounds are normal. There is no distension.      Palpations: Abdomen is soft. There is no mass.      Tenderness: There is no abdominal tenderness.   Musculoskeletal:         General: No swelling or deformity.   Skin:     Coloration: Skin is not jaundiced.   Neurological:      General: No focal deficit present.      Mental Status: He is alert and oriented to person, place, and time. Mental status is at baseline.         Laboratory Data:  Lab Visit on 04/21/2022   Component Date Value Ref Range Status    WBC 04/21/2022 8.37  3.90 - 12.70 K/uL Final    RBC 04/21/2022 4.79  4.60 - 6.20 M/uL Final    Hemoglobin 04/21/2022 13.9 (A) 14.0 - 18.0 g/dL Final    Hematocrit 04/21/2022 43.3  40.0 - 54.0 % Final    MCV 04/21/2022 90  82 - 98 fL Final    MCH 04/21/2022 29.0  27.0 - 31.0 pg Final    MCHC 04/21/2022 32.1  32.0 - 36.0 g/dL Final    RDW 04/21/2022 14.9 (A) 11.5 - 14.5 % Final    Platelets 04/21/2022 257  150 - 450 K/uL Final    MPV 04/21/2022 9.6  9.2 - 12.9 fL Final    Immature Granulocytes 04/21/2022 0.1  0.0 - 0.5 % Final    Gran # (ANC) 04/21/2022 4.2  1.8 - 7.7 K/uL Final    Immature Grans (Abs) 04/21/2022 0.01  0.00 - 0.04 K/uL Final    Comment: Mild elevation in immature granulocytes is non specific and   can be seen in a variety of conditions including stress response,   acute inflammation, trauma and pregnancy. Correlation with other   laboratory and clinical findings is essential.      Lymph # 04/21/2022 3.0  1.0 - 4.8 K/uL Final    Mono # 04/21/2022 1.0  0.3 - 1.0 K/uL Final    Eos # 04/21/2022 0.2  0.0 - 0.5 K/uL Final    Baso # 04/21/2022 0.03  0.00 - 0.20 K/uL Final    nRBC 04/21/2022 0  0 /100 WBC Final    Gran % 04/21/2022 49.8  38.0 - 73.0 % Final    Lymph % 04/21/2022 35.4  18.0 - 48.0 % Final    Mono % 04/21/2022 11.4  4.0 - 15.0 % Final    Eosinophil % 04/21/2022 2.9  0.0 - 8.0 % Final    Basophil % 04/21/2022 0.4  0.0 - 1.9 % Final    Aniso 04/21/2022 Slight   Final    Poik  04/21/2022 Slight   Final    Poly 04/21/2022 Occasional   Final    Hypo 04/21/2022 Occasional   Final    Ovalocytes 04/21/2022 Occasional   Final    Tear Drop Cells 04/21/2022 Occasional   Final    Differential Method 04/21/2022 Automated   Final    Sodium 04/21/2022 141  136 - 145 mmol/L Final    Potassium 04/21/2022 4.6  3.5 - 5.1 mmol/L Final    Chloride 04/21/2022 105  95 - 110 mmol/L Final    CO2 04/21/2022 27  23 - 29 mmol/L Final    Glucose 04/21/2022 94  70 - 110 mg/dL Final    BUN 04/21/2022 12  8 - 23 mg/dL Final    Creatinine 04/21/2022 1.0  0.5 - 1.4 mg/dL Final    Calcium 04/21/2022 9.6  8.7 - 10.5 mg/dL Final    Total Protein 04/21/2022 7.8  6.0 - 8.4 g/dL Final    Albumin 04/21/2022 3.3 (A) 3.5 - 5.2 g/dL Final    Total Bilirubin 04/21/2022 0.8  0.1 - 1.0 mg/dL Final    Comment: For infants and newborns, interpretation of results should be based  on gestational age, weight and in agreement with clinical  observations.    Premature Infant recommended reference ranges:  Up to 24 hours.............<8.0 mg/dL  Up to 48 hours............<12.0 mg/dL  3-5 days..................<15.0 mg/dL  6-29 days.................<15.0 mg/dL      Alkaline Phosphatase 04/21/2022 81  55 - 135 U/L Final    AST 04/21/2022 24  10 - 40 U/L Final    ALT 04/21/2022 14  10 - 44 U/L Final    Anion Gap 04/21/2022 9  8 - 16 mmol/L Final    eGFR if African American 04/21/2022 >60.0  >60 mL/min/1.73 m^2 Final    eGFR if non African American 04/21/2022 >60.0  >60 mL/min/1.73 m^2 Final    Comment: Calculation used to obtain the estimated glomerular filtration  rate (eGFR) is the CKD-EPI equation.       CEA 04/21/2022 3.0  0.0 - 5.0 ng/mL Final    Comment: CEA Normal Range:  Non-Smokers: 0-3.0 ng/mL  Smokers:     0-5.0 ng/mL         CT abdomen pelvis:  Three hypodense heterogeneous liver masses are detected.  The largest is centered in the right hepatic lobe and measures 11.1 x 9.9 x 9.8 cm.  There are also several  low attenuation subcapsular lesions measuring up to 4.3 cm at the dome of the liver (601:67) which could be due to metastatic disease, subcapsular hematoma, or less likely subcapsular infectious process.  The gallbladder is nondistended.  The portosplenic confluence is distended with a large filling defect which could reflect bland thrombus or tumor thrombus measuring 2.2 cm.  This extends into the superior mesenteric vein which is also distended.     The pancreas, spleen, and adrenal glands have a normal appearance.     The kidneys demonstrate normal cortical thickness without hydronephrosis.     There is a soft tissue mass in the transverse colon measuring 4.5 x 4.2 x 3.6 cm.  This is highly suspicious for adenocarcinoma and results in at least high-grade partial colonic obstruction with dilated proximal colon as well as dilated small bowel loops.  Adjacent soft tissue nodules are noted in the mesentery in this region likely reflecting local pily metastases and or mesenteric implants.  The largest discrete     Areas of wall thickening are noted in the distal small bowel and cecum which could be related to venous obstruction at the level of the superior mesenteric vein thrombus.  Scattered colonic diverticula are present without diverticulitis.  There is scattered mild ascites.     The bladder is nondistended and not well evaluated.  The prostate appears mildly enlarged.     No osseous abnormalities identified.     CT chest abdomen pelvis 07/22  Impression:     1. Significant decrease in size of previously identified transverse colon obstructing mass.  The dominant hepatic lesion and presumed portosplenic thrombus have also decreased in size suggesting favorable sponsored therapy.  However, there is an indeterminate left hepatic dome lesion which appears more conspicuous.  2. Interval placement of a transverse colon stent with resolution of prior bowel obstruction.  3. Sigmoid diverticulosis with diffusely  thickened appearance of the proximal sigmoid, possibly related to incomplete distension.  No surrounding findings of diverticulitis.  Correlation with colonoscopy as clinically warranted.    CT C/A/P 10/05/2021  There is decrease in size of right hepatic lobe lesion with cyst stable size of left hepatic lobe lesion.  Additional stable subcentimeter hypodensities with no evidence of any lesions.     Continued decreased size of presumed thrombus at the portal splenic confluence.     Stent remains in the transverse colon with continued decreased conspicuity of previously described mass.    CT CAP 12/28/21  Patient with known metastatic colon cancer.  Hepatic lesions appears similar, measurements as above.      Assessment and Plan        1. Metastatic colon cancer to liver    2. Primary hypertension    3. Hypertension, unspecified type      1.  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  Guardant 360 was negative for BRAF, VIRI, HER2 amplification.   Pretreatment CEA 57.  We had a long and sonia discussion with him about his diagnosis. Unfortunately, the disease is not curable but remains treatable and he has good performance status.   Restaging scans after 7 cycles showed significant reduction in colonic mass and liver mass.   we switched to maintenance as of cycle 8 with 5FU + Pema  Restaging scans from March 2022 show stable disease.   CEA 57.8 (pretreatment) -> Today 3    Labs reviewed from today, proceed with cycle 26  Restaging scans in three months (06/2022)    2. Hypertension   Controlled       Follow-up:   CBC,CMP and chemo chair for 5FU+Pema infusion in two weeks   See me in four weeks 03/24 with CBC,CMP,CEA and chemo chair for  5FU+Pema infusion       The above is discussed and staffed with Dr.Luk Austin Contreras MD  [id number]  Hematology/Oncology fellow

## 2022-04-23 ENCOUNTER — INFUSION (OUTPATIENT)
Dept: INFUSION THERAPY | Facility: HOSPITAL | Age: 72
End: 2022-04-23
Payer: MEDICARE

## 2022-04-23 VITALS
HEART RATE: 70 BPM | SYSTOLIC BLOOD PRESSURE: 142 MMHG | DIASTOLIC BLOOD PRESSURE: 86 MMHG | OXYGEN SATURATION: 99 % | TEMPERATURE: 98 F | RESPIRATION RATE: 18 BRPM

## 2022-04-23 DIAGNOSIS — C78.7 METASTATIC COLON CANCER TO LIVER: Primary | ICD-10-CM

## 2022-04-23 DIAGNOSIS — C18.9 METASTATIC COLON CANCER TO LIVER: Primary | ICD-10-CM

## 2022-04-23 PROCEDURE — 25000003 PHARM REV CODE 250: Performed by: STUDENT IN AN ORGANIZED HEALTH CARE EDUCATION/TRAINING PROGRAM

## 2022-04-23 PROCEDURE — A4216 STERILE WATER/SALINE, 10 ML: HCPCS | Performed by: STUDENT IN AN ORGANIZED HEALTH CARE EDUCATION/TRAINING PROGRAM

## 2022-04-23 PROCEDURE — 63600175 PHARM REV CODE 636 W HCPCS: Performed by: STUDENT IN AN ORGANIZED HEALTH CARE EDUCATION/TRAINING PROGRAM

## 2022-04-23 RX ORDER — SODIUM CHLORIDE 0.9 % (FLUSH) 0.9 %
10 SYRINGE (ML) INJECTION
Status: DISCONTINUED | OUTPATIENT
Start: 2022-04-23 | End: 2022-04-23 | Stop reason: HOSPADM

## 2022-04-23 RX ORDER — HEPARIN 100 UNIT/ML
500 SYRINGE INTRAVENOUS
Status: DISCONTINUED | OUTPATIENT
Start: 2022-04-23 | End: 2022-04-23 | Stop reason: HOSPADM

## 2022-04-23 RX ADMIN — HEPARIN SODIUM (PORCINE) LOCK FLUSH IV SOLN 100 UNIT/ML 500 UNITS: 100 SOLUTION at 10:04

## 2022-04-23 RX ADMIN — Medication 10 ML: at 10:04

## 2022-05-04 ENCOUNTER — LAB VISIT (OUTPATIENT)
Dept: LAB | Facility: HOSPITAL | Age: 72
End: 2022-05-04
Payer: MEDICARE

## 2022-05-04 DIAGNOSIS — C18.9 METASTATIC COLON CANCER TO LIVER: ICD-10-CM

## 2022-05-04 DIAGNOSIS — C78.7 METASTATIC COLON CANCER TO LIVER: ICD-10-CM

## 2022-05-04 LAB
ALBUMIN SERPL BCP-MCNC: 3.2 G/DL (ref 3.5–5.2)
ALP SERPL-CCNC: 76 U/L (ref 55–135)
ALT SERPL W/O P-5'-P-CCNC: 7 U/L (ref 10–44)
ANION GAP SERPL CALC-SCNC: 8 MMOL/L (ref 8–16)
AST SERPL-CCNC: 19 U/L (ref 10–40)
BASOPHILS # BLD AUTO: 0.04 K/UL (ref 0–0.2)
BASOPHILS NFR BLD: 0.5 % (ref 0–1.9)
BILIRUB SERPL-MCNC: 1 MG/DL (ref 0.1–1)
BUN SERPL-MCNC: 10 MG/DL (ref 8–23)
CALCIUM SERPL-MCNC: 10.2 MG/DL (ref 8.7–10.5)
CHLORIDE SERPL-SCNC: 102 MMOL/L (ref 95–110)
CO2 SERPL-SCNC: 28 MMOL/L (ref 23–29)
CREAT SERPL-MCNC: 1.1 MG/DL (ref 0.5–1.4)
DIFFERENTIAL METHOD: ABNORMAL
EOSINOPHIL # BLD AUTO: 0.1 K/UL (ref 0–0.5)
EOSINOPHIL NFR BLD: 1.2 % (ref 0–8)
ERYTHROCYTE [DISTWIDTH] IN BLOOD BY AUTOMATED COUNT: 15 % (ref 11.5–14.5)
EST. GFR  (AFRICAN AMERICAN): >60 ML/MIN/1.73 M^2
EST. GFR  (NON AFRICAN AMERICAN): >60 ML/MIN/1.73 M^2
GLUCOSE SERPL-MCNC: 95 MG/DL (ref 70–110)
HCT VFR BLD AUTO: 46.1 % (ref 40–54)
HGB BLD-MCNC: 14.2 G/DL (ref 14–18)
IMM GRANULOCYTES # BLD AUTO: 0.02 K/UL (ref 0–0.04)
IMM GRANULOCYTES NFR BLD AUTO: 0.2 % (ref 0–0.5)
LYMPHOCYTES # BLD AUTO: 2.4 K/UL (ref 1–4.8)
LYMPHOCYTES NFR BLD: 27.3 % (ref 18–48)
MCH RBC QN AUTO: 29 PG (ref 27–31)
MCHC RBC AUTO-ENTMCNC: 30.8 G/DL (ref 32–36)
MCV RBC AUTO: 94 FL (ref 82–98)
MONOCYTES # BLD AUTO: 1.1 K/UL (ref 0.3–1)
MONOCYTES NFR BLD: 13.1 % (ref 4–15)
NEUTROPHILS # BLD AUTO: 5 K/UL (ref 1.8–7.7)
NEUTROPHILS NFR BLD: 57.7 % (ref 38–73)
NRBC BLD-RTO: 0 /100 WBC
PLATELET # BLD AUTO: 356 K/UL (ref 150–450)
PMV BLD AUTO: 9.8 FL (ref 9.2–12.9)
POTASSIUM SERPL-SCNC: 5.8 MMOL/L (ref 3.5–5.1)
PROT SERPL-MCNC: 8 G/DL (ref 6–8.4)
RBC # BLD AUTO: 4.9 M/UL (ref 4.6–6.2)
SODIUM SERPL-SCNC: 138 MMOL/L (ref 136–145)
WBC # BLD AUTO: 8.69 K/UL (ref 3.9–12.7)

## 2022-05-04 PROCEDURE — 85025 COMPLETE CBC W/AUTO DIFF WBC: CPT | Performed by: STUDENT IN AN ORGANIZED HEALTH CARE EDUCATION/TRAINING PROGRAM

## 2022-05-04 PROCEDURE — 36415 COLL VENOUS BLD VENIPUNCTURE: CPT | Performed by: STUDENT IN AN ORGANIZED HEALTH CARE EDUCATION/TRAINING PROGRAM

## 2022-05-04 PROCEDURE — 80053 COMPREHEN METABOLIC PANEL: CPT | Performed by: STUDENT IN AN ORGANIZED HEALTH CARE EDUCATION/TRAINING PROGRAM

## 2022-05-05 ENCOUNTER — INFUSION (OUTPATIENT)
Dept: INFUSION THERAPY | Facility: HOSPITAL | Age: 72
End: 2022-05-05
Payer: MEDICARE

## 2022-05-05 VITALS
OXYGEN SATURATION: 99 % | TEMPERATURE: 98 F | SYSTOLIC BLOOD PRESSURE: 128 MMHG | HEART RATE: 70 BPM | DIASTOLIC BLOOD PRESSURE: 78 MMHG | BODY MASS INDEX: 20.51 KG/M2 | RESPIRATION RATE: 18 BRPM | WEIGHT: 130.94 LBS

## 2022-05-05 DIAGNOSIS — C18.9 METASTATIC COLON CANCER TO LIVER: Primary | ICD-10-CM

## 2022-05-05 DIAGNOSIS — C78.7 METASTATIC COLON CANCER TO LIVER: Primary | ICD-10-CM

## 2022-05-05 PROCEDURE — 96416 CHEMO PROLONG INFUSE W/PUMP: CPT

## 2022-05-05 PROCEDURE — 25000003 PHARM REV CODE 250: Performed by: INTERNAL MEDICINE

## 2022-05-05 PROCEDURE — 96413 CHEMO IV INFUSION 1 HR: CPT

## 2022-05-05 PROCEDURE — 96375 TX/PRO/DX INJ NEW DRUG ADDON: CPT

## 2022-05-05 PROCEDURE — 63600175 PHARM REV CODE 636 W HCPCS: Mod: JG | Performed by: INTERNAL MEDICINE

## 2022-05-05 RX ORDER — HEPARIN 100 UNIT/ML
500 SYRINGE INTRAVENOUS
Status: CANCELLED | OUTPATIENT
Start: 2022-05-05

## 2022-05-05 RX ORDER — SODIUM CHLORIDE 0.9 % (FLUSH) 0.9 %
10 SYRINGE (ML) INJECTION
Status: CANCELLED | OUTPATIENT
Start: 2022-05-07

## 2022-05-05 RX ORDER — HEPARIN 100 UNIT/ML
500 SYRINGE INTRAVENOUS
Status: CANCELLED | OUTPATIENT
Start: 2022-05-07

## 2022-05-05 RX ORDER — SODIUM CHLORIDE 0.9 % (FLUSH) 0.9 %
10 SYRINGE (ML) INJECTION
Status: DISCONTINUED | OUTPATIENT
Start: 2022-05-05 | End: 2022-05-05 | Stop reason: HOSPADM

## 2022-05-05 RX ORDER — SODIUM CHLORIDE 0.9 % (FLUSH) 0.9 %
10 SYRINGE (ML) INJECTION
Status: CANCELLED | OUTPATIENT
Start: 2022-05-05

## 2022-05-05 RX ORDER — DIPHENHYDRAMINE HYDROCHLORIDE 50 MG/ML
50 INJECTION INTRAMUSCULAR; INTRAVENOUS ONCE AS NEEDED
Status: CANCELLED | OUTPATIENT
Start: 2022-05-05

## 2022-05-05 RX ORDER — EPINEPHRINE 0.3 MG/.3ML
0.3 INJECTION SUBCUTANEOUS ONCE AS NEEDED
Status: DISCONTINUED | OUTPATIENT
Start: 2022-05-05 | End: 2022-05-05 | Stop reason: HOSPADM

## 2022-05-05 RX ORDER — HEPARIN 100 UNIT/ML
500 SYRINGE INTRAVENOUS
Status: DISCONTINUED | OUTPATIENT
Start: 2022-05-05 | End: 2022-05-05 | Stop reason: HOSPADM

## 2022-05-05 RX ORDER — ONDANSETRON 2 MG/ML
8 INJECTION INTRAMUSCULAR; INTRAVENOUS
Status: COMPLETED | OUTPATIENT
Start: 2022-05-05 | End: 2022-05-05

## 2022-05-05 RX ORDER — EPINEPHRINE 0.3 MG/.3ML
0.3 INJECTION SUBCUTANEOUS ONCE AS NEEDED
Status: CANCELLED | OUTPATIENT
Start: 2022-05-05

## 2022-05-05 RX ORDER — ONDANSETRON 2 MG/ML
8 INJECTION INTRAMUSCULAR; INTRAVENOUS
Status: CANCELLED | OUTPATIENT
Start: 2022-05-05

## 2022-05-05 RX ORDER — DIPHENHYDRAMINE HYDROCHLORIDE 50 MG/ML
50 INJECTION INTRAMUSCULAR; INTRAVENOUS ONCE AS NEEDED
Status: DISCONTINUED | OUTPATIENT
Start: 2022-05-05 | End: 2022-05-05 | Stop reason: HOSPADM

## 2022-05-05 RX ADMIN — FLUOROURACIL 4000 MG: 50 INJECTION, SOLUTION INTRAVENOUS at 10:05

## 2022-05-05 RX ADMIN — ONDANSETRON 8 MG: 2 INJECTION, SOLUTION INTRAMUSCULAR; INTRAVENOUS at 09:05

## 2022-05-05 RX ADMIN — SODIUM CHLORIDE: 0.9 INJECTION, SOLUTION INTRAVENOUS at 08:05

## 2022-05-05 RX ADMIN — BEVACIZUMAB 300 MG: 100 INJECTION, SOLUTION INTRAVENOUS at 09:05

## 2022-05-05 NOTE — PLAN OF CARE
Patient tolerated Avasin/ 5FU pump today. NAD. PAC accessed with positive blood return. 5FU CADD pump infusing @ 2.2cc/hr over 46 hours- counting down upon discharge. RTC Saturday @ 830 for pump removal.    and  aware of K=5.8-- ordered to instruct pt to hydrate and eat a diet with low potassium foods. Pt and family educated on increasing hydration and on low potassium diet and given educational handout. Questions answered. Pt given calendar. Discharged home, ambulated independently.

## 2022-05-07 ENCOUNTER — INFUSION (OUTPATIENT)
Dept: INFUSION THERAPY | Facility: HOSPITAL | Age: 72
End: 2022-05-07
Payer: MEDICARE

## 2022-05-07 VITALS
SYSTOLIC BLOOD PRESSURE: 131 MMHG | HEART RATE: 68 BPM | RESPIRATION RATE: 18 BRPM | TEMPERATURE: 99 F | DIASTOLIC BLOOD PRESSURE: 79 MMHG

## 2022-05-07 DIAGNOSIS — C18.9 METASTATIC COLON CANCER TO LIVER: Primary | ICD-10-CM

## 2022-05-07 DIAGNOSIS — C78.7 METASTATIC COLON CANCER TO LIVER: Primary | ICD-10-CM

## 2022-05-07 PROCEDURE — A4216 STERILE WATER/SALINE, 10 ML: HCPCS | Performed by: INTERNAL MEDICINE

## 2022-05-07 PROCEDURE — 63600175 PHARM REV CODE 636 W HCPCS: Performed by: INTERNAL MEDICINE

## 2022-05-07 PROCEDURE — 25000003 PHARM REV CODE 250: Performed by: INTERNAL MEDICINE

## 2022-05-07 RX ORDER — HEPARIN 100 UNIT/ML
500 SYRINGE INTRAVENOUS
Status: DISCONTINUED | OUTPATIENT
Start: 2022-05-07 | End: 2022-05-07 | Stop reason: HOSPADM

## 2022-05-07 RX ORDER — SODIUM CHLORIDE 0.9 % (FLUSH) 0.9 %
10 SYRINGE (ML) INJECTION
Status: DISCONTINUED | OUTPATIENT
Start: 2022-05-07 | End: 2022-05-07 | Stop reason: HOSPADM

## 2022-05-07 RX ADMIN — HEPARIN SODIUM (PORCINE) LOCK FLUSH IV SOLN 100 UNIT/ML 500 UNITS: 100 SOLUTION at 09:05

## 2022-05-07 RX ADMIN — Medication 10 ML: at 09:05

## 2022-05-07 NOTE — NURSING
Pt arrives to clinic for home infusion pump d/c tolerated well. No adverse events reported. Vitals stable. PAC flushed hep locked de accessed. D/c home accompanied by daughter, NAD avs declined.

## 2022-05-18 NOTE — PROGRESS NOTES
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 Downs is a 71 y.o. male who returns following hospital discharge.  70 years old pleasant AA gentleman, with history of hypertension, presenting with two weeks duration of abdominal cramps, diarrhea, nausea and vomiting. His symptoms started gradually with intermittent generalize crampy abdominal pain, worse with food. That was associated with nausea, reflux and occaisonal vomiting. He tried pepto-bismol and imodium with no relief, and hence he presented to ER.   He lost significant amount of weight, but cannot quantify the amount or the time period. He has also been feeling weaker than usual.   He hasn't seen a healthcare provider in over than 10 years. He has never had a colonoscopy.   In the ER. His labs were significant of microcytic anemia of 9.7 g/dl, MCV 77, and Platelets counts of 495. CT of the abdomen and pelvis showed " Large mass in the transverse colon highly suspicious for adenocarcinoma resulting in at least high-grade partial obstruction with dilation of proximal colon and small bowel. Soft tissue nodules in the adjacent mesentery are suspicious for pily metastases and/or mesenteric implants.  Numerous hepatic masses measuring up to 11.2 cm most likely related to metastatic disease.  There " "also several small subcapsular lesions which could reflect additional metastatic disease"   CT chest was negative to metastatic disease.   He underwent colonoscopy and stent placement. 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 HER 2 negative, VIRI/SHERI NGS was cancelled due to insufficient quantity   Guardant 360 was sent, negative for KRAS, NRAS, BRAF     He started palliative chemotherapy with FOLFOX+Pema     Restaging scans on 07/22/2021 showed significant decrease in size trasverse colon mass as well liver metastatic lesion.   He was switched to maintenance 5FU+Pema from C8    Restaging scans from 10/05/2021 (after C11) showing stable-mildly improved liver mets.   Restaging scans from 12/28/2021 (after C17) showing stable disease.   Restaging scans from 03/22/22 show stable disease.     Interval History:   He presents today with his daughter prior to cycle 28. He is tolerating treatment with no issues. He is feeling overall well. He denies nausea, vomiting, constipation. He has good appetite.      Past Medical History:   Past Medical History:   Diagnosis Date    Hypertension     Metastatic colon cancer to liver 4/22/2021       Past Surgical HIstory:   Past Surgical History:   Procedure Laterality Date    COLONOSCOPY N/A 4/20/2021    Procedure: COLONOSCOPY with possible stent;  Surgeon: EDILMA Bonilla MD;  Location: [facility] ([address] [place] [address]);  Service: Colon and Rectal;  Laterality: N/A;    INSERTION OF TUNNELED CENTRAL VENOUS CATHETER (CVC) WITH SUBCUTANEOUS PORT N/A 5/3/2021    Procedure: [id number];  Surgeon: Francisco Layton MD;  Location: [facility] OR [address] [place] [address];  Service: Vascular;  Laterality: N/A;       Family History: History reviewed. No pertinent family history.    Social History:  reports that he has never smoked. He has never used smokeless tobacco. He " reports current alcohol use.    Allergies:  Review of patient's allergies indicates:  No Known Allergies    Medications:  Current Outpatient Medications   Medication Sig Dispense Refill    acetaminophen (TYLENOL) 500 MG tablet Take 1 tablet (500 mg total) by mouth every 6 (six) hours as needed for Pain (alternate with ibuprofen). (Patient not taking: No sig reported)  0    amLODIPine (NORVASC) 10 MG tablet Take 1 tablet (10 mg total) by mouth once daily. 90 tablet 1    ibuprofen (ADVIL,MOTRIN) 400 MG tablet Take 1 tablet (400 mg total) by mouth every 6 (six) hours as needed for Other (pain). Alternate with tylenol (Patient not taking: No sig reported)      LIDOcaine-prilocaine (EMLA) cream Apply topically as needed (Apply one hour before treatment). 30 g 5    ondansetron (ZOFRAN) 4 MG tablet Take 1 tablet (4 mg total) by mouth every 8 (eight) hours as needed for Nausea. (Patient not taking: No sig reported) 30 tablet 3     Current Facility-Administered Medications   Medication Dose Route Frequency Provider Last Rate Last Admin    sars-cov-2 (covid-19) 30 mcg/0.3 ml injection 0.3 mL  0.3 mL Intramuscular 1 time in Clinic/HOD Idania Rock LPN           Review of Systems   Constitutional: Negative for appetite change, fatigue, fever and unexpected weight change.   HENT: Negative for congestion and nosebleeds.    Eyes: Negative for pain and visual disturbance.   Respiratory: Negative for cough, chest tightness, shortness of breath and wheezing.    Cardiovascular: Negative for chest pain, palpitations and leg swelling.   Gastrointestinal: Negative for abdominal pain, blood in stool, constipation, diarrhea, nausea and vomiting.   Genitourinary: Negative for difficulty urinating, flank pain, frequency, hematuria and urgency.   Musculoskeletal: Negative for arthralgias, back pain and myalgias.   Neurological: Negative for dizziness, weakness, numbness and headaches.   Psychiatric/Behavioral: The patient is not  "nervous/anxious.        ECOG Performance Status: 1   Objective:      Vitals:   Vitals:    05/19/22 1112   BP: 131/84   BP Location: Left arm   Patient Position: Sitting   BP Method: Large (Automatic)   Pulse: 70   Resp: 18   Temp: 97.6 °F (36.4 °C)   TempSrc: Oral   SpO2: 97%   Weight: 59.6 kg (131 lb 6.3 oz)   Height: 5' 7" (1.702 m)     Physical Exam  Constitutional:       General: He is not in acute distress.     Appearance: Normal appearance.   HENT:      Head: Normocephalic and atraumatic.   Eyes:      Pupils: Pupils are equal, round, and reactive to light.   Cardiovascular:      Rate and Rhythm: Normal rate and regular rhythm.      Heart sounds: No murmur heard.  Pulmonary:      Effort: No respiratory distress.      Breath sounds: Normal breath sounds. No wheezing.   Abdominal:      General: Abdomen is flat. Bowel sounds are normal. There is no distension.      Palpations: Abdomen is soft. There is no mass.      Tenderness: There is no abdominal tenderness.   Musculoskeletal:         General: No swelling or deformity.   Skin:     Coloration: Skin is not jaundiced.   Neurological:      General: No focal deficit present.      Mental Status: He is alert and oriented to person, place, and time. Mental status is at baseline.         Laboratory Data:  Lab Visit on 05/19/2022   Component Date Value Ref Range Status    WBC 05/19/2022 9.13  3.90 - 12.70 K/uL Final    RBC 05/19/2022 4.69  4.60 - 6.20 M/uL Final    Hemoglobin 05/19/2022 13.7 (A) 14.0 - 18.0 g/dL Final    Hematocrit 05/19/2022 42.2  40.0 - 54.0 % Final    MCV 05/19/2022 90  82 - 98 fL Final    MCH 05/19/2022 29.2  27.0 - 31.0 pg Final    MCHC 05/19/2022 32.5  32.0 - 36.0 g/dL Final    RDW 05/19/2022 15.3 (A) 11.5 - 14.5 % Final    Platelets 05/19/2022 285  150 - 450 K/uL Final    MPV 05/19/2022 9.6  9.2 - 12.9 fL Final    Immature Granulocytes 05/19/2022 0.1  0.0 - 0.5 % Final    Gran # (ANC) 05/19/2022 4.5  1.8 - 7.7 K/uL Final    Immature " Grans (Abs) 05/19/2022 0.01  0.00 - 0.04 K/uL Final    Comment: Mild elevation in immature granulocytes is non specific and   can be seen in a variety of conditions including stress response,   acute inflammation, trauma and pregnancy. Correlation with other   laboratory and clinical findings is essential.      Lymph # 05/19/2022 3.3  1.0 - 4.8 K/uL Final    Mono # 05/19/2022 0.9  0.3 - 1.0 K/uL Final    Eos # 05/19/2022 0.3  0.0 - 0.5 K/uL Final    Baso # 05/19/2022 0.07  0.00 - 0.20 K/uL Final    nRBC 05/19/2022 0  0 /100 WBC Final    Gran % 05/19/2022 49.4  38.0 - 73.0 % Final    Lymph % 05/19/2022 36.4  18.0 - 48.0 % Final    Mono % 05/19/2022 10.1  4.0 - 15.0 % Final    Eosinophil % 05/19/2022 3.2  0.0 - 8.0 % Final    Basophil % 05/19/2022 0.8  0.0 - 1.9 % Final    Differential Method 05/19/2022 Automated   Final    Sodium 05/19/2022 137  136 - 145 mmol/L Final    Potassium 05/19/2022 4.2  3.5 - 5.1 mmol/L Final    Chloride 05/19/2022 105  95 - 110 mmol/L Final    CO2 05/19/2022 23  23 - 29 mmol/L Final    Glucose 05/19/2022 82  70 - 110 mg/dL Final    BUN 05/19/2022 14  8 - 23 mg/dL Final    Creatinine 05/19/2022 1.1  0.5 - 1.4 mg/dL Final    Calcium 05/19/2022 9.2  8.7 - 10.5 mg/dL Final    Total Protein 05/19/2022 7.5  6.0 - 8.4 g/dL Final    Albumin 05/19/2022 3.2 (A) 3.5 - 5.2 g/dL Final    Total Bilirubin 05/19/2022 0.8  0.1 - 1.0 mg/dL Final    Comment: For infants and newborns, interpretation of results should be based  on gestational age, weight and in agreement with clinical  observations.    Premature Infant recommended reference ranges:  Up to 24 hours.............<8.0 mg/dL  Up to 48 hours............<12.0 mg/dL  3-5 days..................<15.0 mg/dL  6-29 days.................<15.0 mg/dL      Alkaline Phosphatase 05/19/2022 69  55 - 135 U/L Final    AST 05/19/2022 20  10 - 40 U/L Final    ALT 05/19/2022 10  10 - 44 U/L Final    Anion Gap 05/19/2022 9  8 - 16 mmol/L Final     eGFR if African American 05/19/2022 >60.0  >60 mL/min/1.73 m^2 Final    eGFR if non African American 05/19/2022 >60.0  >60 mL/min/1.73 m^2 Final    Comment: Calculation used to obtain the estimated glomerular filtration  rate (eGFR) is the CKD-EPI equation.          CT abdomen pelvis:  Three hypodense heterogeneous liver masses are detected.  The largest is centered in the right hepatic lobe and measures 11.1 x 9.9 x 9.8 cm.  There are also several low attenuation subcapsular lesions measuring up to 4.3 cm at the dome of the liver (601:67) which could be due to metastatic disease, subcapsular hematoma, or less likely subcapsular infectious process.  The gallbladder is nondistended.  The portosplenic confluence is distended with a large filling defect which could reflect bland thrombus or tumor thrombus measuring 2.2 cm.  This extends into the superior mesenteric vein which is also distended.     The pancreas, spleen, and adrenal glands have a normal appearance.     The kidneys demonstrate normal cortical thickness without hydronephrosis.     There is a soft tissue mass in the transverse colon measuring 4.5 x 4.2 x 3.6 cm.  This is highly suspicious for adenocarcinoma and results in at least high-grade partial colonic obstruction with dilated proximal colon as well as dilated small bowel loops.  Adjacent soft tissue nodules are noted in the mesentery in this region likely reflecting local pily metastases and or mesenteric implants.  The largest discrete     Areas of wall thickening are noted in the distal small bowel and cecum which could be related to venous obstruction at the level of the superior mesenteric vein thrombus.  Scattered colonic diverticula are present without diverticulitis.  There is scattered mild ascites.     The bladder is nondistended and not well evaluated.  The prostate appears mildly enlarged.     No osseous abnormalities identified.     CT chest abdomen pelvis 07/22  Impression:     1.  Significant decrease in size of previously identified transverse colon obstructing mass.  The dominant hepatic lesion and presumed portosplenic thrombus have also decreased in size suggesting favorable sponsored therapy.  However, there is an indeterminate left hepatic dome lesion which appears more conspicuous.  2. Interval placement of a transverse colon stent with resolution of prior bowel obstruction.  3. Sigmoid diverticulosis with diffusely thickened appearance of the proximal sigmoid, possibly related to incomplete distension.  No surrounding findings of diverticulitis.  Correlation with colonoscopy as clinically warranted.    CT C/A/P 10/05/2021  There is decrease in size of right hepatic lobe lesion with cyst stable size of left hepatic lobe lesion.  Additional stable subcentimeter hypodensities with no evidence of any lesions.     Continued decreased size of presumed thrombus at the portal splenic confluence.     Stent remains in the transverse colon with continued decreased conspicuity of previously described mass.    CT CAP 12/28/21  Patient with known metastatic colon cancer.  Hepatic lesions appears similar, measurements as above.      Assessment and Plan        1. Metastatic colon cancer to liver    2. Primary hypertension      1.  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  Guardant 360 was negative for BRAF, VIRI, HER2 amplification.   Pretreatment CEA 57.  We had a long and sonia discussion with him about his diagnosis. Unfortunately, the disease is not curable but remains treatable and he has good performance status.   Restaging scans after 7 cycles showed significant reduction in colonic mass and liver mass.   we switched to maintenance as of cycle 8 with 5FU + Pema  Restaging scans from March 2022 show stable disease.   CEA 57.8 (pretreatment) -> Today is pending    Labs reviewed from today, proceed with cycle 28  Restaging scans in three months (06/2022)    2.  Hypertension   Controlled       Follow-up:   CBC,CMP and chemo chair for 5FU+Pema infusion in two weeks   See me in four weeks 06/16 with CBC,CMP,CEA and chemo chair for  5FU+Pema infusion       The above is discussed and staffed with Dr.Mizrahi Austin Contreras MD  [id number]  Hematology/Oncology fellow

## 2022-05-19 ENCOUNTER — INFUSION (OUTPATIENT)
Dept: INFUSION THERAPY | Facility: HOSPITAL | Age: 72
End: 2022-05-19
Payer: MEDICARE

## 2022-05-19 ENCOUNTER — OFFICE VISIT (OUTPATIENT)
Dept: HEMATOLOGY/ONCOLOGY | Facility: CLINIC | Age: 72
End: 2022-05-19
Payer: MEDICARE

## 2022-05-19 VITALS
BODY MASS INDEX: 20.62 KG/M2 | OXYGEN SATURATION: 97 % | SYSTOLIC BLOOD PRESSURE: 131 MMHG | HEIGHT: 67 IN | RESPIRATION RATE: 18 BRPM | WEIGHT: 131.38 LBS | DIASTOLIC BLOOD PRESSURE: 84 MMHG | HEART RATE: 70 BPM | TEMPERATURE: 98 F

## 2022-05-19 VITALS — DIASTOLIC BLOOD PRESSURE: 72 MMHG | SYSTOLIC BLOOD PRESSURE: 125 MMHG | HEART RATE: 58 BPM

## 2022-05-19 DIAGNOSIS — C78.7 METASTATIC COLON CANCER TO LIVER: Primary | ICD-10-CM

## 2022-05-19 DIAGNOSIS — C18.9 METASTATIC COLON CANCER TO LIVER: Primary | ICD-10-CM

## 2022-05-19 DIAGNOSIS — I10 PRIMARY HYPERTENSION: ICD-10-CM

## 2022-05-19 LAB
BILIRUB UR QL STRIP: NEGATIVE
CLARITY UR REFRACT.AUTO: ABNORMAL
COLOR UR AUTO: YELLOW
GLUCOSE UR QL STRIP: NEGATIVE
HGB UR QL STRIP: ABNORMAL
HYALINE CASTS UR QL AUTO: 2 /LPF
KETONES UR QL STRIP: NEGATIVE
LEUKOCYTE ESTERASE UR QL STRIP: NEGATIVE
MICROSCOPIC COMMENT: ABNORMAL
NITRITE UR QL STRIP: NEGATIVE
PH UR STRIP: 5 [PH] (ref 5–8)
PROT UR QL STRIP: NEGATIVE
RBC #/AREA URNS AUTO: 1 /HPF (ref 0–4)
SP GR UR STRIP: 1.01 (ref 1–1.03)
URN SPEC COLLECT METH UR: ABNORMAL
WBC #/AREA URNS AUTO: 2 /HPF (ref 0–5)

## 2022-05-19 PROCEDURE — 99215 OFFICE O/P EST HI 40 MIN: CPT | Mod: S$PBB,GC,, | Performed by: STUDENT IN AN ORGANIZED HEALTH CARE EDUCATION/TRAINING PROGRAM

## 2022-05-19 PROCEDURE — 96375 TX/PRO/DX INJ NEW DRUG ADDON: CPT

## 2022-05-19 PROCEDURE — 81001 URINALYSIS AUTO W/SCOPE: CPT | Performed by: STUDENT IN AN ORGANIZED HEALTH CARE EDUCATION/TRAINING PROGRAM

## 2022-05-19 PROCEDURE — 25000003 PHARM REV CODE 250: Performed by: INTERNAL MEDICINE

## 2022-05-19 PROCEDURE — 99999 PR PBB SHADOW E&M-EST. PATIENT-LVL III: ICD-10-PCS | Mod: PBBFAC,GC,, | Performed by: STUDENT IN AN ORGANIZED HEALTH CARE EDUCATION/TRAINING PROGRAM

## 2022-05-19 PROCEDURE — 99999 PR PBB SHADOW E&M-EST. PATIENT-LVL III: CPT | Mod: PBBFAC,GC,, | Performed by: STUDENT IN AN ORGANIZED HEALTH CARE EDUCATION/TRAINING PROGRAM

## 2022-05-19 PROCEDURE — 63600175 PHARM REV CODE 636 W HCPCS: Performed by: INTERNAL MEDICINE

## 2022-05-19 PROCEDURE — 99215 PR OFFICE/OUTPT VISIT, EST, LEVL V, 40-54 MIN: ICD-10-PCS | Mod: S$PBB,GC,, | Performed by: STUDENT IN AN ORGANIZED HEALTH CARE EDUCATION/TRAINING PROGRAM

## 2022-05-19 PROCEDURE — 96416 CHEMO PROLONG INFUSE W/PUMP: CPT

## 2022-05-19 PROCEDURE — 99213 OFFICE O/P EST LOW 20 MIN: CPT | Mod: PBBFAC,25 | Performed by: STUDENT IN AN ORGANIZED HEALTH CARE EDUCATION/TRAINING PROGRAM

## 2022-05-19 PROCEDURE — 96413 CHEMO IV INFUSION 1 HR: CPT

## 2022-05-19 RX ORDER — HEPARIN 100 UNIT/ML
500 SYRINGE INTRAVENOUS
Status: CANCELLED | OUTPATIENT
Start: 2022-05-19

## 2022-05-19 RX ORDER — EPINEPHRINE 0.3 MG/.3ML
0.3 INJECTION SUBCUTANEOUS ONCE AS NEEDED
Status: DISCONTINUED | OUTPATIENT
Start: 2022-05-19 | End: 2022-05-19 | Stop reason: HOSPADM

## 2022-05-19 RX ORDER — ONDANSETRON 2 MG/ML
8 INJECTION INTRAMUSCULAR; INTRAVENOUS
Status: CANCELLED | OUTPATIENT
Start: 2022-06-02

## 2022-05-19 RX ORDER — DIPHENHYDRAMINE HYDROCHLORIDE 50 MG/ML
50 INJECTION INTRAMUSCULAR; INTRAVENOUS ONCE AS NEEDED
Status: CANCELLED | OUTPATIENT
Start: 2022-05-19

## 2022-05-19 RX ORDER — ONDANSETRON 2 MG/ML
8 INJECTION INTRAMUSCULAR; INTRAVENOUS
Status: COMPLETED | OUTPATIENT
Start: 2022-05-19 | End: 2022-05-19

## 2022-05-19 RX ORDER — HEPARIN 100 UNIT/ML
500 SYRINGE INTRAVENOUS
Status: CANCELLED | OUTPATIENT
Start: 2022-05-21

## 2022-05-19 RX ORDER — DIPHENHYDRAMINE HYDROCHLORIDE 50 MG/ML
50 INJECTION INTRAMUSCULAR; INTRAVENOUS ONCE AS NEEDED
Status: DISCONTINUED | OUTPATIENT
Start: 2022-05-19 | End: 2022-05-19 | Stop reason: HOSPADM

## 2022-05-19 RX ORDER — EPINEPHRINE 0.3 MG/.3ML
0.3 INJECTION SUBCUTANEOUS ONCE AS NEEDED
Status: CANCELLED | OUTPATIENT
Start: 2022-05-19

## 2022-05-19 RX ORDER — SODIUM CHLORIDE 0.9 % (FLUSH) 0.9 %
10 SYRINGE (ML) INJECTION
Status: CANCELLED | OUTPATIENT
Start: 2022-06-07

## 2022-05-19 RX ORDER — DIPHENHYDRAMINE HYDROCHLORIDE 50 MG/ML
50 INJECTION INTRAMUSCULAR; INTRAVENOUS ONCE AS NEEDED
Status: CANCELLED | OUTPATIENT
Start: 2022-06-02

## 2022-05-19 RX ORDER — SODIUM CHLORIDE 0.9 % (FLUSH) 0.9 %
10 SYRINGE (ML) INJECTION
Status: DISCONTINUED | OUTPATIENT
Start: 2022-05-19 | End: 2022-05-19 | Stop reason: HOSPADM

## 2022-05-19 RX ORDER — HEPARIN 100 UNIT/ML
500 SYRINGE INTRAVENOUS
Status: DISCONTINUED | OUTPATIENT
Start: 2022-05-19 | End: 2022-05-19 | Stop reason: HOSPADM

## 2022-05-19 RX ORDER — SODIUM CHLORIDE 0.9 % (FLUSH) 0.9 %
10 SYRINGE (ML) INJECTION
Status: CANCELLED | OUTPATIENT
Start: 2022-06-02

## 2022-05-19 RX ORDER — EPINEPHRINE 0.3 MG/.3ML
0.3 INJECTION SUBCUTANEOUS ONCE AS NEEDED
Status: CANCELLED | OUTPATIENT
Start: 2022-06-02

## 2022-05-19 RX ORDER — SODIUM CHLORIDE 0.9 % (FLUSH) 0.9 %
10 SYRINGE (ML) INJECTION
Status: CANCELLED | OUTPATIENT
Start: 2022-05-19

## 2022-05-19 RX ORDER — HEPARIN 100 UNIT/ML
500 SYRINGE INTRAVENOUS
Status: CANCELLED | OUTPATIENT
Start: 2022-06-02

## 2022-05-19 RX ORDER — SODIUM CHLORIDE 0.9 % (FLUSH) 0.9 %
10 SYRINGE (ML) INJECTION
Status: CANCELLED | OUTPATIENT
Start: 2022-05-21

## 2022-05-19 RX ORDER — HEPARIN 100 UNIT/ML
500 SYRINGE INTRAVENOUS
Status: CANCELLED | OUTPATIENT
Start: 2022-06-07

## 2022-05-19 RX ORDER — ONDANSETRON 2 MG/ML
8 INJECTION INTRAMUSCULAR; INTRAVENOUS
Status: CANCELLED | OUTPATIENT
Start: 2022-05-19

## 2022-05-19 RX ADMIN — ONDANSETRON 8 MG: 2 INJECTION, SOLUTION INTRAMUSCULAR; INTRAVENOUS at 12:05

## 2022-05-19 RX ADMIN — SODIUM CHLORIDE: 0.9 INJECTION, SOLUTION INTRAVENOUS at 12:05

## 2022-05-19 RX ADMIN — BEVACIZUMAB 300 MG: 100 INJECTION, SOLUTION INTRAVENOUS at 12:05

## 2022-05-19 RX ADMIN — FLUOROURACIL 4000 MG: 50 INJECTION, SOLUTION INTRAVENOUS at 01:05

## 2022-05-19 NOTE — PLAN OF CARE
Avastin administered and tolerated well. PAC infusing 5fu via cadd pump settings verified, lines secured, dressing clean dry intact. Pt able to identify phone number to infusion services if assistance is needed. Scheduled to rtn 5/21/22 at 1130am for pump d/c. Avs given ambulated away from unit, NAD vitals stable.

## 2022-05-19 NOTE — Clinical Note
CBC,CMP and chemo chair for 5FU+Pema infusion in two weeks  See me in four weeks 06/16 with CBC,CMP,CEA and chemo chair for  5FU+Pema infusion

## 2022-05-21 ENCOUNTER — INFUSION (OUTPATIENT)
Dept: INFUSION THERAPY | Facility: HOSPITAL | Age: 72
End: 2022-05-21
Payer: MEDICARE

## 2022-05-21 VITALS
SYSTOLIC BLOOD PRESSURE: 123 MMHG | WEIGHT: 131.38 LBS | HEIGHT: 67 IN | TEMPERATURE: 98 F | BODY MASS INDEX: 20.62 KG/M2 | HEART RATE: 68 BPM | RESPIRATION RATE: 18 BRPM | DIASTOLIC BLOOD PRESSURE: 76 MMHG

## 2022-05-21 DIAGNOSIS — C18.9 METASTATIC COLON CANCER TO LIVER: Primary | ICD-10-CM

## 2022-05-21 DIAGNOSIS — C78.7 METASTATIC COLON CANCER TO LIVER: Primary | ICD-10-CM

## 2022-05-21 RX ORDER — HEPARIN 100 UNIT/ML
500 SYRINGE INTRAVENOUS
Status: DISCONTINUED | OUTPATIENT
Start: 2022-05-21 | End: 2022-05-21 | Stop reason: HOSPADM

## 2022-05-21 RX ORDER — SODIUM CHLORIDE 0.9 % (FLUSH) 0.9 %
10 SYRINGE (ML) INJECTION
Status: DISCONTINUED | OUTPATIENT
Start: 2022-05-21 | End: 2022-05-21 | Stop reason: HOSPADM

## 2022-05-25 ENCOUNTER — TELEPHONE (OUTPATIENT)
Dept: HEMATOLOGY/ONCOLOGY | Facility: CLINIC | Age: 72
End: 2022-05-25
Payer: MEDICARE

## 2022-05-25 NOTE — TELEPHONE ENCOUNTER
----- Message from Austin Contreras MD sent at 5/19/2022 11:45 AM CDT -----  CBC,CMP and chemo chair for 5FU+Pema infusion in two weeks   See me in four weeks 06/16 with CBC,CMP,CEA and chemo chair for  5FU+Pema infusion

## 2022-06-06 ENCOUNTER — INFUSION (OUTPATIENT)
Dept: INFUSION THERAPY | Facility: HOSPITAL | Age: 72
End: 2022-06-06
Attending: STUDENT IN AN ORGANIZED HEALTH CARE EDUCATION/TRAINING PROGRAM
Payer: MEDICARE

## 2022-06-06 ENCOUNTER — LAB VISIT (OUTPATIENT)
Dept: LAB | Facility: HOSPITAL | Age: 72
End: 2022-06-06
Attending: STUDENT IN AN ORGANIZED HEALTH CARE EDUCATION/TRAINING PROGRAM
Payer: MEDICARE

## 2022-06-06 VITALS
SYSTOLIC BLOOD PRESSURE: 132 MMHG | HEART RATE: 80 BPM | BODY MASS INDEX: 20.37 KG/M2 | WEIGHT: 130.06 LBS | RESPIRATION RATE: 18 BRPM | TEMPERATURE: 98 F | DIASTOLIC BLOOD PRESSURE: 80 MMHG

## 2022-06-06 DIAGNOSIS — C18.9 METASTATIC COLON CANCER TO LIVER: ICD-10-CM

## 2022-06-06 DIAGNOSIS — C78.7 METASTATIC COLON CANCER TO LIVER: Primary | ICD-10-CM

## 2022-06-06 DIAGNOSIS — C78.7 METASTATIC COLON CANCER TO LIVER: ICD-10-CM

## 2022-06-06 DIAGNOSIS — C18.9 METASTATIC COLON CANCER TO LIVER: Primary | ICD-10-CM

## 2022-06-06 LAB
ALBUMIN SERPL BCP-MCNC: 3.1 G/DL (ref 3.5–5.2)
ALP SERPL-CCNC: 73 U/L (ref 55–135)
ALT SERPL W/O P-5'-P-CCNC: 10 U/L (ref 10–44)
ANION GAP SERPL CALC-SCNC: 8 MMOL/L (ref 8–16)
AST SERPL-CCNC: 21 U/L (ref 10–40)
BASOPHILS # BLD AUTO: 0.04 K/UL (ref 0–0.2)
BASOPHILS NFR BLD: 0.5 % (ref 0–1.9)
BILIRUB SERPL-MCNC: 0.8 MG/DL (ref 0.1–1)
BUN SERPL-MCNC: 12 MG/DL (ref 8–23)
CALCIUM SERPL-MCNC: 9.4 MG/DL (ref 8.7–10.5)
CHLORIDE SERPL-SCNC: 106 MMOL/L (ref 95–110)
CO2 SERPL-SCNC: 26 MMOL/L (ref 23–29)
CREAT SERPL-MCNC: 1 MG/DL (ref 0.5–1.4)
DIFFERENTIAL METHOD: ABNORMAL
EOSINOPHIL # BLD AUTO: 0.2 K/UL (ref 0–0.5)
EOSINOPHIL NFR BLD: 2.3 % (ref 0–8)
ERYTHROCYTE [DISTWIDTH] IN BLOOD BY AUTOMATED COUNT: 15 % (ref 11.5–14.5)
EST. GFR  (AFRICAN AMERICAN): >60 ML/MIN/1.73 M^2
EST. GFR  (NON AFRICAN AMERICAN): >60 ML/MIN/1.73 M^2
GLUCOSE SERPL-MCNC: 94 MG/DL (ref 70–110)
HCT VFR BLD AUTO: 40.8 % (ref 40–54)
HGB BLD-MCNC: 13 G/DL (ref 14–18)
IMM GRANULOCYTES # BLD AUTO: 0.02 K/UL (ref 0–0.04)
IMM GRANULOCYTES NFR BLD AUTO: 0.3 % (ref 0–0.5)
LYMPHOCYTES # BLD AUTO: 2.6 K/UL (ref 1–4.8)
LYMPHOCYTES NFR BLD: 33.1 % (ref 18–48)
MCH RBC QN AUTO: 29.1 PG (ref 27–31)
MCHC RBC AUTO-ENTMCNC: 31.9 G/DL (ref 32–36)
MCV RBC AUTO: 91 FL (ref 82–98)
MONOCYTES # BLD AUTO: 0.9 K/UL (ref 0.3–1)
MONOCYTES NFR BLD: 11.3 % (ref 4–15)
NEUTROPHILS # BLD AUTO: 4.1 K/UL (ref 1.8–7.7)
NEUTROPHILS NFR BLD: 52.5 % (ref 38–73)
NRBC BLD-RTO: 0 /100 WBC
PLATELET # BLD AUTO: 277 K/UL (ref 150–450)
PMV BLD AUTO: 10.4 FL (ref 9.2–12.9)
POTASSIUM SERPL-SCNC: 3.7 MMOL/L (ref 3.5–5.1)
PROT SERPL-MCNC: 7.3 G/DL (ref 6–8.4)
RBC # BLD AUTO: 4.47 M/UL (ref 4.6–6.2)
SODIUM SERPL-SCNC: 140 MMOL/L (ref 136–145)
WBC # BLD AUTO: 7.86 K/UL (ref 3.9–12.7)

## 2022-06-06 PROCEDURE — 63600175 PHARM REV CODE 636 W HCPCS: Performed by: INTERNAL MEDICINE

## 2022-06-06 PROCEDURE — 25000003 PHARM REV CODE 250: Performed by: INTERNAL MEDICINE

## 2022-06-06 PROCEDURE — 80053 COMPREHEN METABOLIC PANEL: CPT | Performed by: STUDENT IN AN ORGANIZED HEALTH CARE EDUCATION/TRAINING PROGRAM

## 2022-06-06 PROCEDURE — 96413 CHEMO IV INFUSION 1 HR: CPT

## 2022-06-06 PROCEDURE — 85025 COMPLETE CBC W/AUTO DIFF WBC: CPT | Performed by: STUDENT IN AN ORGANIZED HEALTH CARE EDUCATION/TRAINING PROGRAM

## 2022-06-06 PROCEDURE — 36415 COLL VENOUS BLD VENIPUNCTURE: CPT | Performed by: STUDENT IN AN ORGANIZED HEALTH CARE EDUCATION/TRAINING PROGRAM

## 2022-06-06 PROCEDURE — 96416 CHEMO PROLONG INFUSE W/PUMP: CPT

## 2022-06-06 PROCEDURE — 96375 TX/PRO/DX INJ NEW DRUG ADDON: CPT

## 2022-06-06 RX ORDER — SODIUM CHLORIDE 0.9 % (FLUSH) 0.9 %
10 SYRINGE (ML) INJECTION
Status: DISCONTINUED | OUTPATIENT
Start: 2022-06-06 | End: 2022-06-06 | Stop reason: HOSPADM

## 2022-06-06 RX ORDER — DIPHENHYDRAMINE HYDROCHLORIDE 50 MG/ML
50 INJECTION INTRAMUSCULAR; INTRAVENOUS ONCE AS NEEDED
Status: DISCONTINUED | OUTPATIENT
Start: 2022-06-06 | End: 2022-06-06 | Stop reason: HOSPADM

## 2022-06-06 RX ORDER — HEPARIN 100 UNIT/ML
500 SYRINGE INTRAVENOUS
Status: DISCONTINUED | OUTPATIENT
Start: 2022-06-06 | End: 2022-06-06 | Stop reason: HOSPADM

## 2022-06-06 RX ORDER — ONDANSETRON 2 MG/ML
8 INJECTION INTRAMUSCULAR; INTRAVENOUS
Status: COMPLETED | OUTPATIENT
Start: 2022-06-06 | End: 2022-06-06

## 2022-06-06 RX ORDER — EPINEPHRINE 0.3 MG/.3ML
0.3 INJECTION SUBCUTANEOUS ONCE AS NEEDED
Status: DISCONTINUED | OUTPATIENT
Start: 2022-06-06 | End: 2022-06-06 | Stop reason: HOSPADM

## 2022-06-06 RX ADMIN — FLUOROURACIL 4000 MG: 50 INJECTION, SOLUTION INTRAVENOUS at 09:06

## 2022-06-06 RX ADMIN — SODIUM CHLORIDE: 9 INJECTION, SOLUTION INTRAVENOUS at 09:06

## 2022-06-06 RX ADMIN — ONDANSETRON 8 MG: 2 INJECTION, SOLUTION INTRAMUSCULAR; INTRAVENOUS at 08:06

## 2022-06-06 RX ADMIN — BEVACIZUMAB 295 MG: 100 INJECTION, SOLUTION INTRAVENOUS at 09:06

## 2022-06-06 NOTE — PLAN OF CARE
0835 pt here for avastin and 5 FU pump placement, labs, hx, meds, allergies reviewed, pt with no new complaints at this time, reclined in chair, continue to monitor

## 2022-06-06 NOTE — PLAN OF CARE
1000 pt tolerated Avastin without issue, cadd pump infusing at 2.2 ml/hr withouti ssue prior to d/c, pt to rtc wed around 0800 for pump d/c, no distress noted upon d/c to home

## 2022-06-08 ENCOUNTER — INFUSION (OUTPATIENT)
Dept: INFUSION THERAPY | Facility: HOSPITAL | Age: 72
End: 2022-06-08
Payer: MEDICARE

## 2022-06-08 VITALS — DIASTOLIC BLOOD PRESSURE: 78 MMHG | RESPIRATION RATE: 18 BRPM | SYSTOLIC BLOOD PRESSURE: 120 MMHG | HEART RATE: 80 BPM

## 2022-06-08 DIAGNOSIS — C18.9 METASTATIC COLON CANCER TO LIVER: Primary | ICD-10-CM

## 2022-06-08 DIAGNOSIS — C78.7 METASTATIC COLON CANCER TO LIVER: Primary | ICD-10-CM

## 2022-06-08 PROCEDURE — 63600175 PHARM REV CODE 636 W HCPCS: Performed by: INTERNAL MEDICINE

## 2022-06-08 PROCEDURE — A4216 STERILE WATER/SALINE, 10 ML: HCPCS | Performed by: INTERNAL MEDICINE

## 2022-06-08 PROCEDURE — 25000003 PHARM REV CODE 250: Performed by: INTERNAL MEDICINE

## 2022-06-08 RX ORDER — SODIUM CHLORIDE 0.9 % (FLUSH) 0.9 %
10 SYRINGE (ML) INJECTION
Status: DISCONTINUED | OUTPATIENT
Start: 2022-06-08 | End: 2022-06-08 | Stop reason: HOSPADM

## 2022-06-08 RX ORDER — HEPARIN 100 UNIT/ML
500 SYRINGE INTRAVENOUS
Status: DISCONTINUED | OUTPATIENT
Start: 2022-06-08 | End: 2022-06-08 | Stop reason: HOSPADM

## 2022-06-08 RX ADMIN — HEPARIN 500 UNITS: 100 SYRINGE at 08:06

## 2022-06-08 RX ADMIN — Medication 10 ML: at 08:06

## 2022-06-22 NOTE — PROGRESS NOTES
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 Downs is a 72 y.o. male who returns following hospital discharge.  70 years old pleasant AA gentleman, with history of hypertension, presenting with two weeks duration of abdominal cramps, diarrhea, nausea and vomiting. His symptoms started gradually with intermittent generalize crampy abdominal pain, worse with food. That was associated with nausea, reflux and occaisonal vomiting. He tried pepto-bismol and imodium with no relief, and hence he presented to ER.   He lost significant amount of weight, but cannot quantify the amount or the time period. He has also been feeling weaker than usual.   He hasn't seen a healthcare provider in over than 10 years. He has never had a colonoscopy.   In the ER. His labs were significant of microcytic anemia of 9.7 g/dl, MCV 77, and Platelets counts of 495. CT of the abdomen and pelvis showed " Large mass in the transverse colon highly suspicious for adenocarcinoma resulting in at least high-grade partial obstruction with dilation of proximal colon and small bowel. Soft tissue nodules in the adjacent mesentery are suspicious for pily metastases and/or mesenteric implants.  Numerous hepatic masses measuring up to 11.2 cm most likely related to metastatic disease.  There " "also several small subcapsular lesions which could reflect additional metastatic disease"   CT chest was negative to metastatic disease.   He underwent colonoscopy and stent placement. 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 HER 2 negative, VIRI/SHERI NGS was cancelled due to insufficient quantity   Guardant 360 was sent, negative for KRAS, NRAS, BRAF     He started palliative chemotherapy with FOLFOX+Pema     Restaging scans on 07/22/2021 showed significant decrease in size trasverse colon mass as well liver metastatic lesion.   He was switched to maintenance 5FU+Pema from C8    Restaging scans from 10/05/2021 (after C11) showing stable-mildly improved liver mets.   Restaging scans from 12/28/2021 (after C17) showing stable disease.   Restaging scans from 03/22/22 show stable disease.       Interval History:   He presents today with his daughter prior to cycle 30. He is tolerating treatment with no issues. He is feeling overall well. He denies nausea, vomiting, constipation. He has good appetite.      Past Medical History:   Past Medical History:   Diagnosis Date    Hypertension     Metastatic colon cancer to liver 4/22/2021       Past Surgical HIstory:   Past Surgical History:   Procedure Laterality Date    COLONOSCOPY N/A 4/20/2021    Procedure: COLONOSCOPY with possible stent;  Surgeon: EDILMA Bonilla MD;  Location: [facility] ([address] [place] [address]);  Service: Colon and Rectal;  Laterality: N/A;    INSERTION OF TUNNELED CENTRAL VENOUS CATHETER (CVC) WITH SUBCUTANEOUS PORT N/A 5/3/2021    Procedure: [id number];  Surgeon: Francisco Layton MD;  Location: [facility] OR [address] [place] [address];  Service: Vascular;  Laterality: N/A;       Family History: History reviewed. No pertinent family history.    Social History:  reports that he has never smoked. He has never used smokeless tobacco. " He reports current alcohol use.    Allergies:  Review of patient's allergies indicates:  No Known Allergies    Medications:  Current Outpatient Medications   Medication Sig Dispense Refill    acetaminophen (TYLENOL) 500 MG tablet Take 1 tablet (500 mg total) by mouth every 6 (six) hours as needed for Pain (alternate with ibuprofen).  0    amLODIPine (NORVASC) 10 MG tablet Take 1 tablet (10 mg total) by mouth once daily. 90 tablet 1    ibuprofen (ADVIL,MOTRIN) 400 MG tablet Take 1 tablet (400 mg total) by mouth every 6 (six) hours as needed for Other (pain). Alternate with tylenol      LIDOcaine-prilocaine (EMLA) cream Apply topically as needed (Apply one hour before treatment). 30 g 5    ondansetron (ZOFRAN) 4 MG tablet Take 1 tablet (4 mg total) by mouth every 8 (eight) hours as needed for Nausea. 30 tablet 3     Current Facility-Administered Medications   Medication Dose Route Frequency Provider Last Rate Last Admin    sars-cov-2 (covid-19) 30 mcg/0.3 ml injection 0.3 mL  0.3 mL Intramuscular 1 time in Clinic/HOD Idania Rock LPN           Review of Systems   Constitutional: Negative for appetite change, fatigue, fever and unexpected weight change.   HENT: Negative for congestion and nosebleeds.    Eyes: Negative for pain and visual disturbance.   Respiratory: Negative for cough, chest tightness, shortness of breath and wheezing.    Cardiovascular: Negative for chest pain, palpitations and leg swelling.   Gastrointestinal: Negative for abdominal pain, blood in stool, constipation, diarrhea, nausea and vomiting.   Genitourinary: Negative for difficulty urinating, flank pain, frequency, hematuria and urgency.   Musculoskeletal: Negative for arthralgias, back pain and myalgias.   Neurological: Negative for dizziness, weakness, numbness and headaches.   Psychiatric/Behavioral: The patient is not nervous/anxious.        ECOG Performance Status: 1   Objective:      Vitals:   Vitals:    06/23/22 1033   BP: 125/76  "  BP Location: Left arm   Patient Position: Sitting   BP Method: Medium (Automatic)   Pulse: 66   Resp: 18   Temp: 97.6 °F (36.4 °C)   TempSrc: Oral   SpO2: 99%   Weight: 57.3 kg (126 lb 5.2 oz)   Height: 5' 7" (1.702 m)     Physical Exam  Constitutional:       General: He is not in acute distress.     Appearance: Normal appearance.   HENT:      Head: Normocephalic and atraumatic.   Eyes:      Pupils: Pupils are equal, round, and reactive to light.   Cardiovascular:      Rate and Rhythm: Normal rate and regular rhythm.      Heart sounds: No murmur heard.  Pulmonary:      Effort: No respiratory distress.      Breath sounds: Normal breath sounds. No wheezing.   Abdominal:      General: Abdomen is flat. Bowel sounds are normal. There is no distension.      Palpations: Abdomen is soft. There is no mass.      Tenderness: There is no abdominal tenderness.   Musculoskeletal:         General: No swelling or deformity.   Skin:     Coloration: Skin is not jaundiced.   Neurological:      General: No focal deficit present.      Mental Status: He is alert and oriented to person, place, and time. Mental status is at baseline.         Laboratory Data:  Lab Visit on 06/23/2022   Component Date Value Ref Range Status    WBC 06/23/2022 8.91  3.90 - 12.70 K/uL Final    RBC 06/23/2022 4.38 (A) 4.60 - 6.20 M/uL Final    Hemoglobin 06/23/2022 13.1 (A) 14.0 - 18.0 g/dL Final    Hematocrit 06/23/2022 41.3  40.0 - 54.0 % Final    MCV 06/23/2022 94  82 - 98 fL Final    MCH 06/23/2022 29.9  27.0 - 31.0 pg Final    MCHC 06/23/2022 31.7 (A) 32.0 - 36.0 g/dL Final    RDW 06/23/2022 14.7 (A) 11.5 - 14.5 % Final    Platelets 06/23/2022 272  150 - 450 K/uL Final    MPV 06/23/2022 10.1  9.2 - 12.9 fL Final    Immature Granulocytes 06/23/2022 0.2  0.0 - 0.5 % Final    Gran # (ANC) 06/23/2022 4.6  1.8 - 7.7 K/uL Final    Immature Grans (Abs) 06/23/2022 0.02  0.00 - 0.04 K/uL Final    Comment: Mild elevation in immature granulocytes is non " specific and   can be seen in a variety of conditions including stress response,   acute inflammation, trauma and pregnancy. Correlation with other   laboratory and clinical findings is essential.      Lymph # 06/23/2022 3.0  1.0 - 4.8 K/uL Final    Mono # 06/23/2022 1.0  0.3 - 1.0 K/uL Final    Eos # 06/23/2022 0.2  0.0 - 0.5 K/uL Final    Baso # 06/23/2022 0.04  0.00 - 0.20 K/uL Final    nRBC 06/23/2022 0  0 /100 WBC Final    Gran % 06/23/2022 51.8  38.0 - 73.0 % Final    Lymph % 06/23/2022 33.7  18.0 - 48.0 % Final    Mono % 06/23/2022 11.2  4.0 - 15.0 % Final    Eosinophil % 06/23/2022 2.7  0.0 - 8.0 % Final    Basophil % 06/23/2022 0.4  0.0 - 1.9 % Final    Differential Method 06/23/2022 Automated   Final    Sodium 06/23/2022 137  136 - 145 mmol/L Final    Potassium 06/23/2022 3.7  3.5 - 5.1 mmol/L Final    Chloride 06/23/2022 103  95 - 110 mmol/L Final    CO2 06/23/2022 26  23 - 29 mmol/L Final    Glucose 06/23/2022 87  70 - 110 mg/dL Final    BUN 06/23/2022 9  8 - 23 mg/dL Final    Creatinine 06/23/2022 1.1  0.5 - 1.4 mg/dL Final    Calcium 06/23/2022 9.3  8.7 - 10.5 mg/dL Final    Total Protein 06/23/2022 7.1  6.0 - 8.4 g/dL Final    Albumin 06/23/2022 3.0 (A) 3.5 - 5.2 g/dL Final    Total Bilirubin 06/23/2022 1.0  0.1 - 1.0 mg/dL Final    Comment: For infants and newborns, interpretation of results should be based  on gestational age, weight and in agreement with clinical  observations.    Premature Infant recommended reference ranges:  Up to 24 hours.............<8.0 mg/dL  Up to 48 hours............<12.0 mg/dL  3-5 days..................<15.0 mg/dL  6-29 days.................<15.0 mg/dL      Alkaline Phosphatase 06/23/2022 74  55 - 135 U/L Final    AST 06/23/2022 17  10 - 40 U/L Final    ALT 06/23/2022 8 (A) 10 - 44 U/L Final    Anion Gap 06/23/2022 8  8 - 16 mmol/L Final    eGFR if African American 06/23/2022 >60.0  >60 mL/min/1.73 m^2 Final    eGFR if non African American  06/23/2022 >60.0  >60 mL/min/1.73 m^2 Final    Comment: Calculation used to obtain the estimated glomerular filtration  rate (eGFR) is the CKD-EPI equation.            Assessment and Plan        1. Metastatic colon cancer to liver    2. Primary hypertension      1.  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  Guardant 360 was negative for BRAF, VIRI, HER2 amplification.   Pretreatment CEA 57.  We had a long and sonia discussion with him about his diagnosis. Unfortunately, the disease is not curable but remains treatable and he has good performance status.   Restaging scans after 7 cycles showed significant reduction in colonic mass and liver mass.   we switched to maintenance as of cycle 8 with 5FU + Pema  Restaging scans from March 2022 show stable disease.   CEA 57.8 (pretreatment) -> 3.7 05/19    He is due for restaging scans. Will order them today.   Labs reviewed from today, proceed with cycle 30  MRI of the abdomen w wo contrast and CT chest wo contrast     2. Hypertension   Controlled       Follow-up:   CBC,CMP and chemo chair for 5FU+Pema infusion in two weeks   CBC,CMP,CEA and chemo chair for  5FU+Pema infusion in 4 weeks, he will see Dr. Schuster then      The above is discussed and staffed with Dr.Mizrahi Austin Contreras MD  [id number]  Hematology/Oncology fellow

## 2022-06-23 ENCOUNTER — INFUSION (OUTPATIENT)
Dept: INFUSION THERAPY | Facility: HOSPITAL | Age: 72
End: 2022-06-23
Payer: MEDICARE

## 2022-06-23 ENCOUNTER — OFFICE VISIT (OUTPATIENT)
Dept: HEMATOLOGY/ONCOLOGY | Facility: CLINIC | Age: 72
End: 2022-06-23
Payer: MEDICARE

## 2022-06-23 VITALS
SYSTOLIC BLOOD PRESSURE: 125 MMHG | BODY MASS INDEX: 19.82 KG/M2 | HEIGHT: 67 IN | HEART RATE: 66 BPM | RESPIRATION RATE: 18 BRPM | WEIGHT: 126.31 LBS | OXYGEN SATURATION: 99 % | TEMPERATURE: 98 F | DIASTOLIC BLOOD PRESSURE: 76 MMHG

## 2022-06-23 VITALS
HEART RATE: 56 BPM | RESPIRATION RATE: 18 BRPM | DIASTOLIC BLOOD PRESSURE: 71 MMHG | TEMPERATURE: 98 F | SYSTOLIC BLOOD PRESSURE: 119 MMHG

## 2022-06-23 DIAGNOSIS — C78.7 METASTATIC COLON CANCER TO LIVER: Primary | ICD-10-CM

## 2022-06-23 DIAGNOSIS — I10 PRIMARY HYPERTENSION: ICD-10-CM

## 2022-06-23 DIAGNOSIS — C18.9 METASTATIC COLON CANCER TO LIVER: Primary | ICD-10-CM

## 2022-06-23 PROCEDURE — 99999 PR PBB SHADOW E&M-EST. PATIENT-LVL IV: ICD-10-PCS | Mod: PBBFAC,GC,, | Performed by: STUDENT IN AN ORGANIZED HEALTH CARE EDUCATION/TRAINING PROGRAM

## 2022-06-23 PROCEDURE — 96416 CHEMO PROLONG INFUSE W/PUMP: CPT

## 2022-06-23 PROCEDURE — 99999 PR PBB SHADOW E&M-EST. PATIENT-LVL IV: CPT | Mod: PBBFAC,GC,, | Performed by: STUDENT IN AN ORGANIZED HEALTH CARE EDUCATION/TRAINING PROGRAM

## 2022-06-23 PROCEDURE — 25000003 PHARM REV CODE 250: Performed by: INTERNAL MEDICINE

## 2022-06-23 PROCEDURE — 99215 OFFICE O/P EST HI 40 MIN: CPT | Mod: S$PBB,GC,, | Performed by: STUDENT IN AN ORGANIZED HEALTH CARE EDUCATION/TRAINING PROGRAM

## 2022-06-23 PROCEDURE — 99215 PR OFFICE/OUTPT VISIT, EST, LEVL V, 40-54 MIN: ICD-10-PCS | Mod: S$PBB,GC,, | Performed by: STUDENT IN AN ORGANIZED HEALTH CARE EDUCATION/TRAINING PROGRAM

## 2022-06-23 PROCEDURE — 99214 OFFICE O/P EST MOD 30 MIN: CPT | Mod: PBBFAC,25 | Performed by: STUDENT IN AN ORGANIZED HEALTH CARE EDUCATION/TRAINING PROGRAM

## 2022-06-23 PROCEDURE — 96375 TX/PRO/DX INJ NEW DRUG ADDON: CPT

## 2022-06-23 PROCEDURE — 63600175 PHARM REV CODE 636 W HCPCS: Performed by: INTERNAL MEDICINE

## 2022-06-23 PROCEDURE — 96413 CHEMO IV INFUSION 1 HR: CPT

## 2022-06-23 PROCEDURE — 96417 CHEMO IV INFUS EACH ADDL SEQ: CPT

## 2022-06-23 RX ORDER — SODIUM CHLORIDE 0.9 % (FLUSH) 0.9 %
10 SYRINGE (ML) INJECTION
Status: CANCELLED | OUTPATIENT
Start: 2022-07-07

## 2022-06-23 RX ORDER — ONDANSETRON 2 MG/ML
8 INJECTION INTRAMUSCULAR; INTRAVENOUS
Status: CANCELLED | OUTPATIENT
Start: 2022-06-23

## 2022-06-23 RX ORDER — HEPARIN 100 UNIT/ML
500 SYRINGE INTRAVENOUS
Status: CANCELLED | OUTPATIENT
Start: 2022-07-09

## 2022-06-23 RX ORDER — EPINEPHRINE 0.3 MG/.3ML
0.3 INJECTION SUBCUTANEOUS ONCE AS NEEDED
Status: CANCELLED | OUTPATIENT
Start: 2022-06-23

## 2022-06-23 RX ORDER — SODIUM CHLORIDE 0.9 % (FLUSH) 0.9 %
10 SYRINGE (ML) INJECTION
Status: CANCELLED | OUTPATIENT
Start: 2022-07-09

## 2022-06-23 RX ORDER — DIPHENHYDRAMINE HYDROCHLORIDE 50 MG/ML
50 INJECTION INTRAMUSCULAR; INTRAVENOUS ONCE AS NEEDED
Status: CANCELLED | OUTPATIENT
Start: 2022-06-23

## 2022-06-23 RX ORDER — SODIUM CHLORIDE 0.9 % (FLUSH) 0.9 %
10 SYRINGE (ML) INJECTION
Status: CANCELLED | OUTPATIENT
Start: 2022-06-25

## 2022-06-23 RX ORDER — ONDANSETRON 2 MG/ML
8 INJECTION INTRAMUSCULAR; INTRAVENOUS
Status: COMPLETED | OUTPATIENT
Start: 2022-06-23 | End: 2022-06-23

## 2022-06-23 RX ORDER — DIPHENHYDRAMINE HYDROCHLORIDE 50 MG/ML
50 INJECTION INTRAMUSCULAR; INTRAVENOUS ONCE AS NEEDED
Status: DISCONTINUED | OUTPATIENT
Start: 2022-06-23 | End: 2022-06-23 | Stop reason: HOSPADM

## 2022-06-23 RX ORDER — HEPARIN 100 UNIT/ML
500 SYRINGE INTRAVENOUS
Status: CANCELLED | OUTPATIENT
Start: 2022-06-25

## 2022-06-23 RX ORDER — DIPHENHYDRAMINE HYDROCHLORIDE 50 MG/ML
50 INJECTION INTRAMUSCULAR; INTRAVENOUS ONCE AS NEEDED
Status: CANCELLED | OUTPATIENT
Start: 2022-07-07

## 2022-06-23 RX ORDER — EPINEPHRINE 0.3 MG/.3ML
0.3 INJECTION SUBCUTANEOUS ONCE AS NEEDED
Status: DISCONTINUED | OUTPATIENT
Start: 2022-06-23 | End: 2022-06-23 | Stop reason: HOSPADM

## 2022-06-23 RX ORDER — HEPARIN 100 UNIT/ML
500 SYRINGE INTRAVENOUS
Status: DISCONTINUED | OUTPATIENT
Start: 2022-06-23 | End: 2022-06-23 | Stop reason: HOSPADM

## 2022-06-23 RX ORDER — SODIUM CHLORIDE 0.9 % (FLUSH) 0.9 %
10 SYRINGE (ML) INJECTION
Status: CANCELLED | OUTPATIENT
Start: 2022-06-23

## 2022-06-23 RX ORDER — SODIUM CHLORIDE 0.9 % (FLUSH) 0.9 %
10 SYRINGE (ML) INJECTION
Status: DISCONTINUED | OUTPATIENT
Start: 2022-06-23 | End: 2022-06-23 | Stop reason: HOSPADM

## 2022-06-23 RX ORDER — HEPARIN 100 UNIT/ML
500 SYRINGE INTRAVENOUS
Status: CANCELLED | OUTPATIENT
Start: 2022-06-23

## 2022-06-23 RX ORDER — ONDANSETRON 2 MG/ML
8 INJECTION INTRAMUSCULAR; INTRAVENOUS
Status: CANCELLED | OUTPATIENT
Start: 2022-07-07

## 2022-06-23 RX ORDER — HEPARIN 100 UNIT/ML
500 SYRINGE INTRAVENOUS
Status: CANCELLED | OUTPATIENT
Start: 2022-07-07

## 2022-06-23 RX ORDER — EPINEPHRINE 0.3 MG/.3ML
0.3 INJECTION SUBCUTANEOUS ONCE AS NEEDED
Status: CANCELLED | OUTPATIENT
Start: 2022-07-07

## 2022-06-23 RX ADMIN — ONDANSETRON 8 MG: 2 INJECTION, SOLUTION INTRAMUSCULAR; INTRAVENOUS at 11:06

## 2022-06-23 RX ADMIN — SODIUM CHLORIDE: 0.9 INJECTION, SOLUTION INTRAVENOUS at 11:06

## 2022-06-23 RX ADMIN — FLUOROURACIL 3960 MG: 50 INJECTION, SOLUTION INTRAVENOUS at 12:06

## 2022-06-23 RX ADMIN — BEVACIZUMAB 300 MG: 100 INJECTION, SOLUTION INTRAVENOUS at 12:06

## 2022-06-23 NOTE — Clinical Note
- Please schedule MRI of the abdomen and CT chest; the next availability  - Schedule CBC,CMP on 07/07 followed by chemo chair for FOLFOX. Pump disconnect on 07/09.  - schedule CBC,CMP and CEA on 07/21, followed by a visit with Dr. Schuster and chemo chair same day for FOLFOX. Pump disconnect on 07/23 Thanks

## 2022-06-23 NOTE — PLAN OF CARE
Pt tolerated Avastin/5FU CADD pump connection  today. NAD. Port flushed + blood return present, flushed. 5FU CADD pump infusing at a rate of 2.2ml/hr. Verified settings with 2 RNs. Infusion decreasing. Pt to RTC on Saturday at 11am for pump d/c, declined AVS. Uses my [facility]. Discharged home. Ambulated independently with family.   Problem: Adult Inpatient Plan of Care  Goal: Optimal Comfort and Wellbeing  Intervention: Provide Person-Centered Care  Flowsheets (Taken 6/23/2022 1255)  Trust Relationship/Rapport:   care explained   questions answered   choices provided   questions encouraged   empathic listening provided   thoughts/feelings acknowledged   emotional support provided   reassurance provided

## 2022-06-25 ENCOUNTER — INFUSION (OUTPATIENT)
Dept: INFUSION THERAPY | Facility: HOSPITAL | Age: 72
End: 2022-06-25
Payer: MEDICARE

## 2022-06-25 VITALS
DIASTOLIC BLOOD PRESSURE: 73 MMHG | RESPIRATION RATE: 18 BRPM | TEMPERATURE: 98 F | SYSTOLIC BLOOD PRESSURE: 125 MMHG | OXYGEN SATURATION: 98 % | HEART RATE: 59 BPM

## 2022-06-25 DIAGNOSIS — C18.9 METASTATIC COLON CANCER TO LIVER: Primary | ICD-10-CM

## 2022-06-25 DIAGNOSIS — C78.7 METASTATIC COLON CANCER TO LIVER: Primary | ICD-10-CM

## 2022-06-25 PROCEDURE — A4216 STERILE WATER/SALINE, 10 ML: HCPCS | Performed by: INTERNAL MEDICINE

## 2022-06-25 PROCEDURE — 63600175 PHARM REV CODE 636 W HCPCS: Performed by: INTERNAL MEDICINE

## 2022-06-25 PROCEDURE — 25000003 PHARM REV CODE 250: Performed by: INTERNAL MEDICINE

## 2022-06-25 RX ORDER — HEPARIN 100 UNIT/ML
500 SYRINGE INTRAVENOUS
Status: DISCONTINUED | OUTPATIENT
Start: 2022-06-25 | End: 2022-06-25 | Stop reason: HOSPADM

## 2022-06-25 RX ORDER — SODIUM CHLORIDE 0.9 % (FLUSH) 0.9 %
10 SYRINGE (ML) INJECTION
Status: DISCONTINUED | OUTPATIENT
Start: 2022-06-25 | End: 2022-06-25 | Stop reason: HOSPADM

## 2022-06-25 RX ADMIN — Medication 10 ML: at 10:06

## 2022-06-25 RX ADMIN — HEPARIN 500 UNITS: 100 SYRINGE at 10:06

## 2022-06-25 NOTE — NURSING
Pt arrives for cadd pump d/c accompanied by family member. Tolerated home infusion without complications. PAC flushed hep locked de accessed. Pt d/c home. Ambulated away independently, vitals stable.

## 2022-06-27 DIAGNOSIS — C18.9 METASTATIC COLON CANCER TO LIVER: Primary | ICD-10-CM

## 2022-06-27 DIAGNOSIS — C78.7 METASTATIC COLON CANCER TO LIVER: Primary | ICD-10-CM

## 2022-07-07 ENCOUNTER — INFUSION (OUTPATIENT)
Dept: INFUSION THERAPY | Facility: HOSPITAL | Age: 72
End: 2022-07-07
Payer: MEDICARE

## 2022-07-07 VITALS
RESPIRATION RATE: 118 BRPM | HEIGHT: 67 IN | BODY MASS INDEX: 19.82 KG/M2 | DIASTOLIC BLOOD PRESSURE: 71 MMHG | SYSTOLIC BLOOD PRESSURE: 121 MMHG | TEMPERATURE: 97 F | WEIGHT: 126.31 LBS | HEART RATE: 61 BPM

## 2022-07-07 DIAGNOSIS — C18.9 METASTATIC COLON CANCER TO LIVER: Primary | ICD-10-CM

## 2022-07-07 DIAGNOSIS — C78.7 METASTATIC COLON CANCER TO LIVER: Primary | ICD-10-CM

## 2022-07-07 PROCEDURE — 96416 CHEMO PROLONG INFUSE W/PUMP: CPT

## 2022-07-07 PROCEDURE — 96375 TX/PRO/DX INJ NEW DRUG ADDON: CPT

## 2022-07-07 PROCEDURE — 96413 CHEMO IV INFUSION 1 HR: CPT

## 2022-07-07 PROCEDURE — 25000003 PHARM REV CODE 250: Performed by: INTERNAL MEDICINE

## 2022-07-07 PROCEDURE — 63600175 PHARM REV CODE 636 W HCPCS: Performed by: INTERNAL MEDICINE

## 2022-07-07 RX ORDER — EPINEPHRINE 0.3 MG/.3ML
0.3 INJECTION SUBCUTANEOUS ONCE AS NEEDED
Status: DISCONTINUED | OUTPATIENT
Start: 2022-07-07 | End: 2022-07-07 | Stop reason: HOSPADM

## 2022-07-07 RX ORDER — ONDANSETRON 2 MG/ML
8 INJECTION INTRAMUSCULAR; INTRAVENOUS
Status: COMPLETED | OUTPATIENT
Start: 2022-07-07 | End: 2022-07-07

## 2022-07-07 RX ORDER — HEPARIN 100 UNIT/ML
500 SYRINGE INTRAVENOUS
Status: DISCONTINUED | OUTPATIENT
Start: 2022-07-07 | End: 2022-07-07 | Stop reason: HOSPADM

## 2022-07-07 RX ORDER — DIPHENHYDRAMINE HYDROCHLORIDE 50 MG/ML
50 INJECTION INTRAMUSCULAR; INTRAVENOUS ONCE AS NEEDED
Status: DISCONTINUED | OUTPATIENT
Start: 2022-07-07 | End: 2022-07-07 | Stop reason: HOSPADM

## 2022-07-07 RX ORDER — SODIUM CHLORIDE 0.9 % (FLUSH) 0.9 %
10 SYRINGE (ML) INJECTION
Status: DISCONTINUED | OUTPATIENT
Start: 2022-07-07 | End: 2022-07-07 | Stop reason: HOSPADM

## 2022-07-07 RX ADMIN — SODIUM CHLORIDE: 9 INJECTION, SOLUTION INTRAVENOUS at 11:07

## 2022-07-07 RX ADMIN — FLUOROURACIL 3960 MG: 50 INJECTION, SOLUTION INTRAVENOUS at 02:07

## 2022-07-07 RX ADMIN — BEVACIZUMAB 300 MG: 100 INJECTION, SOLUTION INTRAVENOUS at 12:07

## 2022-07-07 RX ADMIN — ONDANSETRON 8 MG: 2 INJECTION, SOLUTION INTRAMUSCULAR; INTRAVENOUS at 11:07

## 2022-07-07 NOTE — PLAN OF CARE
Avastin complete and tolerated well. PAC infusing 5fu at 2.2mls/hr via cadd pump. Settings verified, pump monitor on run with decreasing numbers noted. Lines secured dressing intact. Pt d/c home accompanied by family member. Scheduled to rtn 7/9/22 at 12pm.

## 2022-07-09 ENCOUNTER — INFUSION (OUTPATIENT)
Dept: INFUSION THERAPY | Facility: HOSPITAL | Age: 72
End: 2022-07-09
Payer: MEDICARE

## 2022-07-09 DIAGNOSIS — C78.7 METASTATIC COLON CANCER TO LIVER: Primary | ICD-10-CM

## 2022-07-09 DIAGNOSIS — C18.9 METASTATIC COLON CANCER TO LIVER: Primary | ICD-10-CM

## 2022-07-09 PROCEDURE — 63600175 PHARM REV CODE 636 W HCPCS: Performed by: INTERNAL MEDICINE

## 2022-07-09 PROCEDURE — A4216 STERILE WATER/SALINE, 10 ML: HCPCS | Performed by: INTERNAL MEDICINE

## 2022-07-09 PROCEDURE — 25000003 PHARM REV CODE 250: Performed by: INTERNAL MEDICINE

## 2022-07-09 RX ORDER — HEPARIN 100 UNIT/ML
500 SYRINGE INTRAVENOUS
Status: DISCONTINUED | OUTPATIENT
Start: 2022-07-09 | End: 2022-07-09 | Stop reason: HOSPADM

## 2022-07-09 RX ORDER — SODIUM CHLORIDE 0.9 % (FLUSH) 0.9 %
10 SYRINGE (ML) INJECTION
Status: DISCONTINUED | OUTPATIENT
Start: 2022-07-09 | End: 2022-07-09 | Stop reason: HOSPADM

## 2022-07-09 RX ADMIN — HEPARIN 500 UNITS: 100 SYRINGE at 11:07

## 2022-07-09 RX ADMIN — Medication 10 ML: at 11:07

## 2022-07-12 ENCOUNTER — HOSPITAL ENCOUNTER (OUTPATIENT)
Dept: RADIOLOGY | Facility: HOSPITAL | Age: 72
Discharge: HOME | End: 2022-07-12
Attending: STUDENT IN AN ORGANIZED HEALTH CARE EDUCATION/TRAINING PROGRAM
Payer: MEDICARE

## 2022-07-12 DIAGNOSIS — C18.9 METASTATIC COLON CANCER TO LIVER: ICD-10-CM

## 2022-07-12 DIAGNOSIS — C78.7 METASTATIC COLON CANCER TO LIVER: ICD-10-CM

## 2022-07-12 PROCEDURE — 71250 CT CHEST WITHOUT CONTRAST: ICD-10-PCS | Mod: 26,,, | Performed by: RADIOLOGY

## 2022-07-12 PROCEDURE — 71250 CT THORAX DX C-: CPT | Mod: 26,,, | Performed by: RADIOLOGY

## 2022-07-12 PROCEDURE — 71250 CT THORAX DX C-: CPT | Mod: TC

## 2022-07-18 ENCOUNTER — HOSPITAL ENCOUNTER (OUTPATIENT)
Dept: RADIOLOGY | Facility: OTHER | Age: 72
Discharge: HOME | End: 2022-07-18
Attending: STUDENT IN AN ORGANIZED HEALTH CARE EDUCATION/TRAINING PROGRAM
Payer: MEDICARE

## 2022-07-18 DIAGNOSIS — C18.9 METASTATIC COLON CANCER TO LIVER: ICD-10-CM

## 2022-07-18 DIAGNOSIS — C78.7 METASTATIC COLON CANCER TO LIVER: ICD-10-CM

## 2022-07-18 PROCEDURE — 74183 MRI ABD W/O CNTR FLWD CNTR: CPT | Mod: 26,,, | Performed by: RADIOLOGY

## 2022-07-18 PROCEDURE — 25500020 PHARM REV CODE 255: Performed by: STUDENT IN AN ORGANIZED HEALTH CARE EDUCATION/TRAINING PROGRAM

## 2022-07-18 PROCEDURE — 74183 MRI ABDOMEN-PELVIS W W/O CONTRAST (XPD): ICD-10-PCS | Mod: 26,,, | Performed by: RADIOLOGY

## 2022-07-18 PROCEDURE — 72197 MRI ABDOMEN-PELVIS W W/O CONTRAST (XPD): ICD-10-PCS | Mod: 26,,, | Performed by: RADIOLOGY

## 2022-07-18 PROCEDURE — 72197 MRI PELVIS W/O & W/DYE: CPT | Mod: 26,,, | Performed by: RADIOLOGY

## 2022-07-18 PROCEDURE — 72197 MRI PELVIS W/O & W/DYE: CPT | Mod: TC

## 2022-07-18 PROCEDURE — A9585 GADOBUTROL INJECTION: HCPCS | Performed by: STUDENT IN AN ORGANIZED HEALTH CARE EDUCATION/TRAINING PROGRAM

## 2022-07-18 RX ORDER — GADOBUTROL 604.72 MG/ML
5.5 INJECTION INTRAVENOUS
Status: COMPLETED | OUTPATIENT
Start: 2022-07-18 | End: 2022-07-18

## 2022-07-18 RX ADMIN — GADOBUTROL 5.5 ML: 604.72 INJECTION INTRAVENOUS at 09:07

## 2022-07-21 ENCOUNTER — INFUSION (OUTPATIENT)
Dept: INFUSION THERAPY | Facility: HOSPITAL | Age: 72
End: 2022-07-21
Attending: INTERNAL MEDICINE
Payer: MEDICARE

## 2022-07-21 ENCOUNTER — OFFICE VISIT (OUTPATIENT)
Dept: HEMATOLOGY/ONCOLOGY | Facility: CLINIC | Age: 72
End: 2022-07-21
Payer: MEDICARE

## 2022-07-21 VITALS
SYSTOLIC BLOOD PRESSURE: 113 MMHG | DIASTOLIC BLOOD PRESSURE: 68 MMHG | TEMPERATURE: 99 F | WEIGHT: 126.31 LBS | HEIGHT: 67 IN | BODY MASS INDEX: 19.82 KG/M2 | OXYGEN SATURATION: 99 % | HEART RATE: 69 BPM | RESPIRATION RATE: 18 BRPM

## 2022-07-21 VITALS
BODY MASS INDEX: 19.82 KG/M2 | SYSTOLIC BLOOD PRESSURE: 125 MMHG | WEIGHT: 126.31 LBS | HEART RATE: 62 BPM | DIASTOLIC BLOOD PRESSURE: 69 MMHG | RESPIRATION RATE: 18 BRPM | TEMPERATURE: 98 F | HEIGHT: 67 IN

## 2022-07-21 DIAGNOSIS — Z79.899 IMMUNODEFICIENCY DUE TO CHEMOTHERAPY: ICD-10-CM

## 2022-07-21 DIAGNOSIS — C78.7 METASTATIC COLON CANCER TO LIVER: Primary | ICD-10-CM

## 2022-07-21 DIAGNOSIS — D64.81 ANEMIA ASSOCIATED WITH CHEMOTHERAPY: ICD-10-CM

## 2022-07-21 DIAGNOSIS — T45.1X5A ANEMIA ASSOCIATED WITH CHEMOTHERAPY: ICD-10-CM

## 2022-07-21 DIAGNOSIS — D84.821 IMMUNODEFICIENCY DUE TO CHEMOTHERAPY: ICD-10-CM

## 2022-07-21 DIAGNOSIS — C18.9 METASTATIC COLON CANCER TO LIVER: Primary | ICD-10-CM

## 2022-07-21 DIAGNOSIS — T45.1X5A IMMUNODEFICIENCY DUE TO CHEMOTHERAPY: ICD-10-CM

## 2022-07-21 DIAGNOSIS — I10 PRIMARY HYPERTENSION: ICD-10-CM

## 2022-07-21 PROCEDURE — 99215 PR OFFICE/OUTPT VISIT, EST, LEVL V, 40-54 MIN: ICD-10-PCS | Mod: S$PBB,,, | Performed by: INTERNAL MEDICINE

## 2022-07-21 PROCEDURE — 96375 TX/PRO/DX INJ NEW DRUG ADDON: CPT

## 2022-07-21 PROCEDURE — 25000003 PHARM REV CODE 250: Performed by: INTERNAL MEDICINE

## 2022-07-21 PROCEDURE — 99215 OFFICE O/P EST HI 40 MIN: CPT | Mod: S$PBB,,, | Performed by: INTERNAL MEDICINE

## 2022-07-21 PROCEDURE — 99999 PR PBB SHADOW E&M-EST. PATIENT-LVL III: CPT | Mod: PBBFAC,,, | Performed by: INTERNAL MEDICINE

## 2022-07-21 PROCEDURE — 63600175 PHARM REV CODE 636 W HCPCS: Mod: JW,JG | Performed by: INTERNAL MEDICINE

## 2022-07-21 PROCEDURE — 96416 CHEMO PROLONG INFUSE W/PUMP: CPT

## 2022-07-21 PROCEDURE — 96417 CHEMO IV INFUS EACH ADDL SEQ: CPT

## 2022-07-21 PROCEDURE — 96413 CHEMO IV INFUSION 1 HR: CPT

## 2022-07-21 PROCEDURE — 99999 PR PBB SHADOW E&M-EST. PATIENT-LVL III: ICD-10-PCS | Mod: PBBFAC,,, | Performed by: INTERNAL MEDICINE

## 2022-07-21 PROCEDURE — 99213 OFFICE O/P EST LOW 20 MIN: CPT | Mod: PBBFAC,25 | Performed by: INTERNAL MEDICINE

## 2022-07-21 RX ORDER — ONDANSETRON 2 MG/ML
8 INJECTION INTRAMUSCULAR; INTRAVENOUS
Status: CANCELLED | OUTPATIENT
Start: 2022-07-21

## 2022-07-21 RX ORDER — HEPARIN 100 UNIT/ML
500 SYRINGE INTRAVENOUS
Status: DISCONTINUED | OUTPATIENT
Start: 2022-07-21 | End: 2022-07-21 | Stop reason: HOSPADM

## 2022-07-21 RX ORDER — HEPARIN 100 UNIT/ML
500 SYRINGE INTRAVENOUS
Status: CANCELLED | OUTPATIENT
Start: 2022-07-21

## 2022-07-21 RX ORDER — DIPHENHYDRAMINE HYDROCHLORIDE 50 MG/ML
50 INJECTION INTRAMUSCULAR; INTRAVENOUS ONCE AS NEEDED
Status: DISCONTINUED | OUTPATIENT
Start: 2022-07-21 | End: 2022-07-21 | Stop reason: HOSPADM

## 2022-07-21 RX ORDER — SODIUM CHLORIDE 0.9 % (FLUSH) 0.9 %
10 SYRINGE (ML) INJECTION
Status: DISCONTINUED | OUTPATIENT
Start: 2022-07-21 | End: 2022-07-21 | Stop reason: HOSPADM

## 2022-07-21 RX ORDER — ONDANSETRON 2 MG/ML
8 INJECTION INTRAMUSCULAR; INTRAVENOUS
Status: COMPLETED | OUTPATIENT
Start: 2022-07-21 | End: 2022-07-21

## 2022-07-21 RX ORDER — EPINEPHRINE 0.3 MG/.3ML
0.3 INJECTION SUBCUTANEOUS ONCE AS NEEDED
Status: CANCELLED | OUTPATIENT
Start: 2022-07-21

## 2022-07-21 RX ORDER — HEPARIN 100 UNIT/ML
500 SYRINGE INTRAVENOUS
Status: CANCELLED | OUTPATIENT
Start: 2022-07-23

## 2022-07-21 RX ORDER — DIPHENHYDRAMINE HYDROCHLORIDE 50 MG/ML
50 INJECTION INTRAMUSCULAR; INTRAVENOUS ONCE AS NEEDED
Status: CANCELLED | OUTPATIENT
Start: 2022-07-21

## 2022-07-21 RX ORDER — EPINEPHRINE 0.3 MG/.3ML
0.3 INJECTION SUBCUTANEOUS ONCE AS NEEDED
Status: DISCONTINUED | OUTPATIENT
Start: 2022-07-21 | End: 2022-07-21 | Stop reason: HOSPADM

## 2022-07-21 RX ORDER — SODIUM CHLORIDE 0.9 % (FLUSH) 0.9 %
10 SYRINGE (ML) INJECTION
Status: CANCELLED | OUTPATIENT
Start: 2022-07-21

## 2022-07-21 RX ORDER — SODIUM CHLORIDE 0.9 % (FLUSH) 0.9 %
10 SYRINGE (ML) INJECTION
Status: CANCELLED | OUTPATIENT
Start: 2022-07-23

## 2022-07-21 RX ADMIN — SODIUM CHLORIDE: 9 INJECTION, SOLUTION INTRAVENOUS at 01:07

## 2022-07-21 RX ADMIN — ONDANSETRON 8 MG: 2 INJECTION, SOLUTION INTRAMUSCULAR; INTRAVENOUS at 01:07

## 2022-07-21 RX ADMIN — BEVACIZUMAB 285 MG: 100 INJECTION, SOLUTION INTRAVENOUS at 02:07

## 2022-07-21 RX ADMIN — FLUOROURACIL 3960 MG: 50 INJECTION, SOLUTION INTRAVENOUS at 03:07

## 2022-07-21 NOTE — PLAN OF CARE
Pt tolerated Avastin/ 5FU CADD pump connection today. NAD. Port flushed + blood return present, flushed 5FU CADD pump infusing at a rate of 2.3ml/hr. verified settings with 2 RNS. Infusion decreasing.  Pt to RTC on Saturday at 11am for pump d/c, Pt declined AVS. Uses my [facility]. Discharged home. Ambulated independently with daughter.   Problem: Adult Inpatient Plan of Care  Goal: Optimal Comfort and Wellbeing  Intervention: Provide Person-Centered Care  Flowsheets (Taken 7/21/2022 1523)  Trust Relationship/Rapport:   thoughts/feelings acknowledged   care explained   choices provided   emotional support provided   empathic listening provided   questions answered   reassurance provided   questions encouraged

## 2022-07-21 NOTE — PROGRESS NOTES
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 Downs is a 72 y.o. male who returns following hospital discharge.  70 years old pleasant AA gentleman, with history of hypertension, presenting with two weeks duration of abdominal cramps, diarrhea, nausea and vomiting. His symptoms started gradually with intermittent generalize crampy abdominal pain, worse with food. That was associated with nausea, reflux and occaisonal vomiting. He tried pepto-bismol and imodium with no relief, and hence he presented to ER.   He lost significant amount of weight, but cannot quantify the amount or the time period. He has also been feeling weaker than usual.   He hasn't seen a healthcare provider in over than 10 years. He has never had a colonoscopy.   In the ER. His labs were significant of microcytic anemia of 9.7 g/dl, MCV 77, and Platelets counts of 495. CT of the abdomen and pelvis showed " Large mass in the transverse colon highly suspicious for adenocarcinoma resulting in at least high-grade partial obstruction with dilation of proximal colon and small bowel. Soft tissue nodules in the adjacent mesentery are suspicious for pily metastases and/or mesenteric implants.  Numerous hepatic masses measuring up to 11.2 cm most likely related to metastatic disease.  There " "also several small subcapsular lesions which could reflect additional metastatic disease"   CT chest was negative to metastatic disease.   He underwent colonoscopy and stent placement. 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 HER 2 negative, VIRI/SHERI NGS was cancelled due to insufficient quantity   Guardant 360 was sent, negative for KRAS, NRAS, BRAF     He started palliative chemotherapy with FOLFOX+Pema     Restaging scans on 07/22/2021 showed significant decrease in size trasverse colon mass as well liver metastatic lesion.   He was switched to maintenance 5FU+Pema from C8    Restaging scans from 10/05/2021 (after C11) showing stable-mildly improved liver mets.   Restaging scans from 12/28/2021 (after C17) showing stable disease.   Restaging scans from 03/22/22 show stable disease.     MRI on 7/18/22    Impression:     Slight increase in size of the large hepatic metastasis when accounting for differences in imaging technique.  No new disease.     Remaining 2 liver lesions remain stable in size.  The larger lesion in the left hepatic lobe has features typical of a hemangioma.  Lesion at the hepatic dome is difficult to fully characterize due to location and breathing motion artifact, though may represent a hemangioma as well.  This can be followed.     Additional stable chronic findings as above.    Interval History:   He presents today with his daughter prior to cycle 31 of 5FU and Avastin. He reports doing well since his last visit with intact energy level, appetite, and positive outlook. He did report some back pain following his MRI secondary to having to lay flat for a prolonged period which is slowly improving. We discussed the findings on his MRI abdomen concerning for progression of disease with the plan of discussing him in liver conference with various " options to be discussed with him expressing agreement and understanding.       Past Medical History:   Past Medical History:   Diagnosis Date    Hypertension     Metastatic colon cancer to liver 4/22/2021       Past Surgical HIstory:   Past Surgical History:   Procedure Laterality Date    COLONOSCOPY N/A 4/20/2021    Procedure: COLONOSCOPY with possible stent;  Surgeon: EDILMA Bonilla MD;  Location: [facility] (2ND FLR);  Service: Colon and Rectal;  Laterality: N/A;    INSERTION OF TUNNELED CENTRAL VENOUS CATHETER (CVC) WITH SUBCUTANEOUS PORT N/A 5/3/2021    Procedure: [id number];  Surgeon: Francisco Layton MD;  Location: [facility] OR 2ND FLR;  Service: Vascular;  Laterality: N/A;       Family History: No family history on file.    Social History:  reports that he has never smoked. He has never used smokeless tobacco. He reports current alcohol use.    Allergies:  Review of patient's allergies indicates:  No Known Allergies    Medications:  Current Outpatient Medications   Medication Sig Dispense Refill    acetaminophen (TYLENOL) 500 MG tablet Take 1 tablet (500 mg total) by mouth every 6 (six) hours as needed for Pain (alternate with ibuprofen).  0    amLODIPine (NORVASC) 10 MG tablet Take 1 tablet (10 mg total) by mouth once daily. 90 tablet 1    ibuprofen (ADVIL,MOTRIN) 400 MG tablet Take 1 tablet (400 mg total) by mouth every 6 (six) hours as needed for Other (pain). Alternate with tylenol      LIDOcaine-prilocaine (EMLA) cream Apply topically as needed (Apply one hour before treatment). 30 g 5    ondansetron (ZOFRAN) 4 MG tablet Take 1 tablet (4 mg total) by mouth every 8 (eight) hours as needed for Nausea. 30 tablet 3     Current Facility-Administered Medications   Medication Dose Route Frequency Provider Last Rate Last Admin    sars-cov-2 (covid-19) 30 mcg/0.3 ml injection 0.3 mL  0.3 mL Intramuscular 1 time in Clinic/HOD Idania Rock LPN           Review of Systems   Constitutional:  "Negative for appetite change, fatigue, fever and unexpected weight change.   HENT: Negative for congestion and nosebleeds.    Eyes: Negative for pain and visual disturbance.   Respiratory: Negative for cough, chest tightness, shortness of breath and wheezing.    Cardiovascular: Negative for chest pain, palpitations and leg swelling.   Gastrointestinal: Negative for abdominal pain, blood in stool, constipation, diarrhea, nausea and vomiting.   Genitourinary: Negative for difficulty urinating, flank pain, frequency, hematuria and urgency.   Musculoskeletal: Positive for back pain. Negative for arthralgias and myalgias.   Neurological: Negative for dizziness, weakness, numbness and headaches.   Psychiatric/Behavioral: The patient is not nervous/anxious.        ECOG Performance Status: 1   Objective:      Vitals:   Vitals:    07/21/22 1116   BP: 113/68   BP Location: Left arm   Patient Position: Sitting   BP Method: Small (Automatic)   Pulse: 69   Resp: 18   Temp: 98.6 °F (37 °C)   TempSrc: Oral   SpO2: 99%   Weight: 57.3 kg (126 lb 5.2 oz)   Height: 5' 7" (1.702 m)     Physical Exam  Constitutional:       General: He is not in acute distress.     Appearance: Normal appearance.   HENT:      Head: Normocephalic and atraumatic.   Eyes:      Pupils: Pupils are equal, round, and reactive to light.   Cardiovascular:      Rate and Rhythm: Normal rate and regular rhythm.      Pulses: Normal pulses.      Heart sounds: Normal heart sounds. No murmur heard.  Pulmonary:      Effort: Pulmonary effort is normal. No respiratory distress.      Breath sounds: Normal breath sounds. No wheezing.   Abdominal:      General: Abdomen is flat. Bowel sounds are normal. There is no distension.      Palpations: Abdomen is soft. There is no mass.      Tenderness: There is no abdominal tenderness.   Musculoskeletal:         General: No swelling or deformity. Normal range of motion.   Skin:     Coloration: Skin is not jaundiced.   Neurological:     "  General: No focal deficit present.      Mental Status: He is alert and oriented to person, place, and time. Mental status is at baseline.   Psychiatric:         Mood and Affect: Mood normal.         Laboratory Data:  Lab Visit on 07/21/2022   Component Date Value Ref Range Status    WBC 07/21/2022 8.67  3.90 - 12.70 K/uL Final    RBC 07/21/2022 4.42 (A) 4.60 - 6.20 M/uL Final    Hemoglobin 07/21/2022 13.0 (A) 14.0 - 18.0 g/dL Final    Hematocrit 07/21/2022 42.3  40.0 - 54.0 % Final    MCV 07/21/2022 96  82 - 98 fL Final    MCH 07/21/2022 29.4  27.0 - 31.0 pg Final    MCHC 07/21/2022 30.7 (A) 32.0 - 36.0 g/dL Final    RDW 07/21/2022 15.3 (A) 11.5 - 14.5 % Final    Platelets 07/21/2022 248  150 - 450 K/uL Final    MPV 07/21/2022 9.9  9.2 - 12.9 fL Final    Immature Granulocytes 07/21/2022 0.1  0.0 - 0.5 % Final    Gran # (ANC) 07/21/2022 4.3  1.8 - 7.7 K/uL Final    Immature Grans (Abs) 07/21/2022 0.01  0.00 - 0.04 K/uL Final    Comment: Mild elevation in immature granulocytes is non specific and   can be seen in a variety of conditions including stress response,   acute inflammation, trauma and pregnancy. Correlation with other   laboratory and clinical findings is essential.      Lymph # 07/21/2022 2.9  1.0 - 4.8 K/uL Final    Mono # 07/21/2022 1.0  0.3 - 1.0 K/uL Final    Eos # 07/21/2022 0.4  0.0 - 0.5 K/uL Final    Baso # 07/21/2022 0.04  0.00 - 0.20 K/uL Final    nRBC 07/21/2022 0  0 /100 WBC Final    Gran % 07/21/2022 49.5  38.0 - 73.0 % Final    Lymph % 07/21/2022 33.8  18.0 - 48.0 % Final    Mono % 07/21/2022 11.4  4.0 - 15.0 % Final    Eosinophil % 07/21/2022 4.7  0.0 - 8.0 % Final    Basophil % 07/21/2022 0.5  0.0 - 1.9 % Final    Differential Method 07/21/2022 Automated   Final    Sodium 07/21/2022 137  136 - 145 mmol/L Final    Potassium 07/21/2022 4.2  3.5 - 5.1 mmol/L Final    Chloride 07/21/2022 105  95 - 110 mmol/L Final    CO2 07/21/2022 25  23 - 29 mmol/L Final     Glucose 07/21/2022 72  70 - 110 mg/dL Final    BUN 07/21/2022 14  8 - 23 mg/dL Final    Creatinine 07/21/2022 1.0  0.5 - 1.4 mg/dL Final    Calcium 07/21/2022 9.3  8.7 - 10.5 mg/dL Final    Total Protein 07/21/2022 7.3  6.0 - 8.4 g/dL Final    Albumin 07/21/2022 3.0 (A) 3.5 - 5.2 g/dL Final    Total Bilirubin 07/21/2022 0.5  0.1 - 1.0 mg/dL Final    Comment: For infants and newborns, interpretation of results should be based  on gestational age, weight and in agreement with clinical  observations.    Premature Infant recommended reference ranges:  Up to 24 hours.............<8.0 mg/dL  Up to 48 hours............<12.0 mg/dL  3-5 days..................<15.0 mg/dL  6-29 days.................<15.0 mg/dL      Alkaline Phosphatase 07/21/2022 77  55 - 135 U/L Final    AST 07/21/2022 20  10 - 40 U/L Final    ALT 07/21/2022 9 (A) 10 - 44 U/L Final    Anion Gap 07/21/2022 7 (A) 8 - 16 mmol/L Final    eGFR if African American 07/21/2022 >60.0  >60 mL/min/1.73 m^2 Final    eGFR if non African American 07/21/2022 >60.0  >60 mL/min/1.73 m^2 Final    Comment: Calculation used to obtain the estimated glomerular filtration  rate (eGFR) is the CKD-EPI equation.            Assessment and Plan        1. Metastatic colon cancer to liver    2. Primary hypertension    3. Anemia associated with chemotherapy    4. Immunodeficiency due to chemotherapy      1.  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  Guardant 360 was negative for BRAF, VIRI, HER2 amplification.   Pretreatment CEA 57.  We had a long and sonia discussion with him about his diagnosis. Unfortunately, the disease is not curable but remains treatable and he has good performance status.   Restaging scans after 7 cycles showed significant reduction in colonic mass and liver mass.   we switched to maintenance as of cycle 8 with 5FU + Pema  Restaging scans from March 2022 show stable disease.   MRI on 7/18/22 showing hepatic progression  of disease in the right hepatic lobe noted on the exam    --Proceed with cycle 31 of 5-FU and Avastin today  --Plan to [facility] to discuss localregional therapy options  --Follow-up with patient following above recommendations    2. Hypertension   --Controlled       Kimo Oh D.O.  Hematology/Oncology Fellow, PGY-IV    Route Chart for Scheduling    Med Onc Chart Routing      Follow up with physician 2 weeks. For 5-FU + avastin   Follow up with RACHEL 4 weeks. For 5-FU + avastin   Infusion scheduling note    Injection scheduling note    Labs CBC, CMP, CEA and urinalysis   Lab interval:     Imaging    Pharmacy appointment    Other referrals          Treatment Plan Information   OP COLORECTAL FOLFOX + BEVACIZUMAB Q2W   Torres Pantoja MD   Upcoming Treatment Dates - OP COLORECTAL FOLFOX + BEVACIZUMAB Q2W    8/4/2022       Chemotherapy       bevacizumab (AVASTIN) 300 mg in sodium chloride 0.9% 100 mL chemo infusion       fluorouraciL 2,400 mg/m2 = 3,960 mg in sodium chloride 0.9% 100 mL chemo infusion       Antiemetics       ondansetron injection 8 mg  8/18/2022       Chemotherapy       bevacizumab (AVASTIN) 300 mg in sodium chloride 0.9% 100 mL chemo infusion       fluorouraciL 2,400 mg/m2 = 3,960 mg in sodium chloride 0.9% 100 mL chemo infusion       Antiemetics       ondansetron injection 8 mg

## 2022-07-23 ENCOUNTER — INFUSION (OUTPATIENT)
Dept: INFUSION THERAPY | Facility: HOSPITAL | Age: 72
End: 2022-07-23
Payer: MEDICARE

## 2022-07-23 VITALS
RESPIRATION RATE: 18 BRPM | HEART RATE: 64 BPM | DIASTOLIC BLOOD PRESSURE: 76 MMHG | SYSTOLIC BLOOD PRESSURE: 119 MMHG | TEMPERATURE: 98 F

## 2022-07-23 DIAGNOSIS — C18.9 METASTATIC COLON CANCER TO LIVER: Primary | ICD-10-CM

## 2022-07-23 DIAGNOSIS — C78.7 METASTATIC COLON CANCER TO LIVER: Primary | ICD-10-CM

## 2022-07-23 PROCEDURE — 63600175 PHARM REV CODE 636 W HCPCS: Performed by: INTERNAL MEDICINE

## 2022-07-23 PROCEDURE — A4216 STERILE WATER/SALINE, 10 ML: HCPCS | Performed by: INTERNAL MEDICINE

## 2022-07-23 PROCEDURE — 25000003 PHARM REV CODE 250: Performed by: INTERNAL MEDICINE

## 2022-07-23 RX ORDER — HEPARIN 100 UNIT/ML
500 SYRINGE INTRAVENOUS
Status: DISCONTINUED | OUTPATIENT
Start: 2022-07-23 | End: 2022-07-23 | Stop reason: HOSPADM

## 2022-07-23 RX ORDER — SODIUM CHLORIDE 0.9 % (FLUSH) 0.9 %
10 SYRINGE (ML) INJECTION
Status: DISCONTINUED | OUTPATIENT
Start: 2022-07-23 | End: 2022-07-23 | Stop reason: HOSPADM

## 2022-07-23 RX ADMIN — Medication 10 ML: at 11:07

## 2022-07-23 RX ADMIN — HEPARIN SODIUM (PORCINE) LOCK FLUSH IV SOLN 100 UNIT/ML 500 UNITS: 100 SOLUTION at 11:07

## 2022-07-23 NOTE — NURSING
1100 pt here for pump d/c, infusion complete, port flushed per policy and deaccessed without issue, no distress noted upon d/c to home

## 2022-08-03 ENCOUNTER — LAB VISIT (OUTPATIENT)
Dept: LAB | Facility: HOSPITAL | Age: 72
End: 2022-08-03
Attending: INTERNAL MEDICINE
Payer: MEDICARE

## 2022-08-03 ENCOUNTER — OFFICE VISIT (OUTPATIENT)
Dept: HEMATOLOGY/ONCOLOGY | Facility: CLINIC | Age: 72
End: 2022-08-03
Payer: MEDICARE

## 2022-08-03 VITALS
TEMPERATURE: 98 F | RESPIRATION RATE: 18 BRPM | DIASTOLIC BLOOD PRESSURE: 80 MMHG | HEIGHT: 67 IN | HEART RATE: 58 BPM | SYSTOLIC BLOOD PRESSURE: 133 MMHG | BODY MASS INDEX: 20.07 KG/M2 | WEIGHT: 127.88 LBS | OXYGEN SATURATION: 99 %

## 2022-08-03 DIAGNOSIS — C18.9 METASTATIC COLON CANCER TO LIVER: Primary | ICD-10-CM

## 2022-08-03 DIAGNOSIS — C78.7 METASTATIC COLON CANCER TO LIVER: ICD-10-CM

## 2022-08-03 DIAGNOSIS — Z79.899 IMMUNODEFICIENCY DUE TO CHEMOTHERAPY: ICD-10-CM

## 2022-08-03 DIAGNOSIS — D84.821 IMMUNODEFICIENCY DUE TO CHEMOTHERAPY: ICD-10-CM

## 2022-08-03 DIAGNOSIS — I10 PRIMARY HYPERTENSION: ICD-10-CM

## 2022-08-03 DIAGNOSIS — I10 HYPERTENSION, UNSPECIFIED TYPE: ICD-10-CM

## 2022-08-03 DIAGNOSIS — C78.7 METASTATIC COLON CANCER TO LIVER: Primary | ICD-10-CM

## 2022-08-03 DIAGNOSIS — T45.1X5A IMMUNODEFICIENCY DUE TO CHEMOTHERAPY: ICD-10-CM

## 2022-08-03 DIAGNOSIS — C18.9 METASTATIC COLON CANCER TO LIVER: ICD-10-CM

## 2022-08-03 LAB
ALBUMIN SERPL BCP-MCNC: 3.1 G/DL (ref 3.5–5.2)
ALP SERPL-CCNC: 82 U/L (ref 55–135)
ALT SERPL W/O P-5'-P-CCNC: 11 U/L (ref 10–44)
ANION GAP SERPL CALC-SCNC: 8 MMOL/L (ref 8–16)
AST SERPL-CCNC: 21 U/L (ref 10–40)
BASOPHILS # BLD AUTO: 0.04 K/UL (ref 0–0.2)
BASOPHILS NFR BLD: 0.5 % (ref 0–1.9)
BILIRUB SERPL-MCNC: 0.7 MG/DL (ref 0.1–1)
BUN SERPL-MCNC: 10 MG/DL (ref 8–23)
CALCIUM SERPL-MCNC: 9 MG/DL (ref 8.7–10.5)
CEA SERPL-MCNC: 12.1 NG/ML (ref 0–5)
CHLORIDE SERPL-SCNC: 104 MMOL/L (ref 95–110)
CO2 SERPL-SCNC: 26 MMOL/L (ref 23–29)
CREAT SERPL-MCNC: 0.9 MG/DL (ref 0.5–1.4)
DIFFERENTIAL METHOD: ABNORMAL
EOSINOPHIL # BLD AUTO: 0.5 K/UL (ref 0–0.5)
EOSINOPHIL NFR BLD: 6.6 % (ref 0–8)
ERYTHROCYTE [DISTWIDTH] IN BLOOD BY AUTOMATED COUNT: 15.4 % (ref 11.5–14.5)
EST. GFR  (NO RACE VARIABLE): >60 ML/MIN/1.73 M^2
GLUCOSE SERPL-MCNC: 83 MG/DL (ref 70–110)
HCT VFR BLD AUTO: 41.6 % (ref 40–54)
HGB BLD-MCNC: 13 G/DL (ref 14–18)
IMM GRANULOCYTES # BLD AUTO: 0.01 K/UL (ref 0–0.04)
IMM GRANULOCYTES NFR BLD AUTO: 0.1 % (ref 0–0.5)
LYMPHOCYTES # BLD AUTO: 3.1 K/UL (ref 1–4.8)
LYMPHOCYTES NFR BLD: 38.8 % (ref 18–48)
MCH RBC QN AUTO: 29.6 PG (ref 27–31)
MCHC RBC AUTO-ENTMCNC: 31.3 G/DL (ref 32–36)
MCV RBC AUTO: 95 FL (ref 82–98)
MONOCYTES # BLD AUTO: 0.9 K/UL (ref 0.3–1)
MONOCYTES NFR BLD: 11.4 % (ref 4–15)
NEUTROPHILS # BLD AUTO: 3.4 K/UL (ref 1.8–7.7)
NEUTROPHILS NFR BLD: 42.6 % (ref 38–73)
NRBC BLD-RTO: 0 /100 WBC
PLATELET # BLD AUTO: 279 K/UL (ref 150–450)
PMV BLD AUTO: 10.1 FL (ref 9.2–12.9)
POTASSIUM SERPL-SCNC: 3.7 MMOL/L (ref 3.5–5.1)
PROT SERPL-MCNC: 7.4 G/DL (ref 6–8.4)
RBC # BLD AUTO: 4.39 M/UL (ref 4.6–6.2)
SODIUM SERPL-SCNC: 138 MMOL/L (ref 136–145)
WBC # BLD AUTO: 7.97 K/UL (ref 3.9–12.7)

## 2022-08-03 PROCEDURE — 82378 CARCINOEMBRYONIC ANTIGEN: CPT | Performed by: INTERNAL MEDICINE

## 2022-08-03 PROCEDURE — 36415 COLL VENOUS BLD VENIPUNCTURE: CPT | Performed by: INTERNAL MEDICINE

## 2022-08-03 PROCEDURE — 99213 OFFICE O/P EST LOW 20 MIN: CPT | Mod: PBBFAC | Performed by: PHYSICIAN ASSISTANT

## 2022-08-03 PROCEDURE — 80053 COMPREHEN METABOLIC PANEL: CPT | Performed by: INTERNAL MEDICINE

## 2022-08-03 PROCEDURE — 99999 PR PBB SHADOW E&M-EST. PATIENT-LVL III: CPT | Mod: PBBFAC,,, | Performed by: PHYSICIAN ASSISTANT

## 2022-08-03 PROCEDURE — 99999 PR PBB SHADOW E&M-EST. PATIENT-LVL III: ICD-10-PCS | Mod: PBBFAC,,, | Performed by: PHYSICIAN ASSISTANT

## 2022-08-03 PROCEDURE — 85025 COMPLETE CBC W/AUTO DIFF WBC: CPT | Performed by: INTERNAL MEDICINE

## 2022-08-03 PROCEDURE — 99215 OFFICE O/P EST HI 40 MIN: CPT | Mod: S$PBB,,, | Performed by: PHYSICIAN ASSISTANT

## 2022-08-03 PROCEDURE — 99215 PR OFFICE/OUTPT VISIT, EST, LEVL V, 40-54 MIN: ICD-10-PCS | Mod: S$PBB,,, | Performed by: PHYSICIAN ASSISTANT

## 2022-08-03 RX ORDER — AMLODIPINE BESYLATE 10 MG/1
10 TABLET ORAL DAILY
Qty: 90 TABLET | Refills: 3 | Status: SHIPPED | OUTPATIENT
Start: 2022-08-03 | End: 2023-12-11 | Stop reason: SDUPTHER

## 2022-08-03 NOTE — PROGRESS NOTES
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 Downs is a 72 y.o. male   70 years old pleasant AA gentleman, with history of hypertension, presenting with two weeks duration of abdominal cramps, diarrhea, nausea and vomiting. His symptoms started gradually with intermittent generalize crampy abdominal pain, worse with food. That was associated with nausea, reflux and occaisonal vomiting. He tried pepto-bismol and imodium with no relief, and hence he presented to ER.   He lost significant amount of weight, but cannot quantify the amount or the time period. He has also been feeling weaker than usual.   He hasn't seen a healthcare provider in over than 10 years. He has never had a colonoscopy.   In the ER. His labs were significant of microcytic anemia of 9.7 g/dl, MCV 77, and Platelets counts of 495. CT of the abdomen and pelvis showed " Large mass in the transverse colon highly suspicious for adenocarcinoma resulting in at least high-grade partial obstruction with dilation of proximal colon and small bowel. Soft tissue nodules in the adjacent mesentery are suspicious for pily metastases and/or mesenteric implants.  Numerous hepatic masses measuring up to 11.2 cm most likely related to metastatic disease.  There also several " "small subcapsular lesions which could reflect additional metastatic disease"   CT chest was negative to metastatic disease.   He underwent colonoscopy and stent placement. 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 HER 2 negative, VIRI/SHERI NGS was cancelled due to insufficient quantity   Guardant 360 was sent, negative for KRAS, NRAS, BRAF     He started palliative chemotherapy with FOLFOX+Pema     Restaging scans on 07/22/2021 showed significant decrease in size trasverse colon mass as well liver metastatic lesion.   He was switched to maintenance 5FU+Pema from C8    Restaging scans from 10/05/2021 (after C11) showing stable-mildly improved liver mets.   Restaging scans from 12/28/2021 (after C17) showing stable disease.   Restaging scans from 03/22/22 show stable disease.     MRI on 7/18/22    Impression:     Slight increase in size of the large hepatic metastasis when accounting for differences in imaging technique.  No new disease.     Remaining 2 liver lesions remain stable in size.  The larger lesion in the left hepatic lobe has features typical of a hemangioma.  Lesion at the hepatic dome is difficult to fully characterize due to location and breathing motion artifact, though may represent a hemangioma as well.  This can be followed.     Additional stable chronic findings as above.    Interval History:   He presents today with his daughter prior to cycle 33 of 5FU and Avastin. He reports doing well since his last visit with intact energy level, appetite, and positive outlook. He is eating well and has a good appetite. We discussed the findings on his MRI abdomen during his previous visit, concerning for progression of disease with the plan of discussing him in liver conference with various options to be discussed with him expressing agreement and understanding. " No other complaints or concerns today. Overall, doing well.     Presents with his daughter today. ECOG status is 1.       Past Medical History:   Past Medical History:   Diagnosis Date    Hypertension     Metastatic colon cancer to liver 4/22/2021       Past Surgical HIstory:   Past Surgical History:   Procedure Laterality Date    COLONOSCOPY N/A 4/20/2021    Procedure: COLONOSCOPY with possible stent;  Surgeon: EDILMA Bonilla MD;  Location: [facility] (2ND FLR);  Service: Colon and Rectal;  Laterality: N/A;    INSERTION OF TUNNELED CENTRAL VENOUS CATHETER (CVC) WITH SUBCUTANEOUS PORT N/A 5/3/2021    Procedure: [id number];  Surgeon: Francisco Layton MD;  Location: [facility] OR 2ND FLR;  Service: Vascular;  Laterality: N/A;       Family History: History reviewed. No pertinent family history.    Social History:  reports that he has never smoked. He has never used smokeless tobacco. He reports current alcohol use.    Allergies:  Review of patient's allergies indicates:  No Known Allergies    Medications:  Current Outpatient Medications   Medication Sig Dispense Refill    acetaminophen (TYLENOL) 500 MG tablet Take 1 tablet (500 mg total) by mouth every 6 (six) hours as needed for Pain (alternate with ibuprofen).  0    amLODIPine (NORVASC) 10 MG tablet Take 1 tablet (10 mg total) by mouth once daily. 90 tablet 3    ibuprofen (ADVIL,MOTRIN) 400 MG tablet Take 1 tablet (400 mg total) by mouth every 6 (six) hours as needed for Other (pain). Alternate with tylenol      LIDOcaine-prilocaine (EMLA) cream Apply topically as needed (Apply one hour before treatment). 30 g 5    ondansetron (ZOFRAN) 4 MG tablet Take 1 tablet (4 mg total) by mouth every 8 (eight) hours as needed for Nausea. 30 tablet 3     Current Facility-Administered Medications   Medication Dose Route Frequency Provider Last Rate Last Admin    sars-cov-2 (covid-19) 30 mcg/0.3 ml injection 0.3 mL  0.3 mL Intramuscular 1 time in Clinic/HOD Idania BERMUDEZ  "Hand, LPN           Review of Systems   Constitutional: Negative for appetite change, fatigue, fever and unexpected weight change.   HENT: Negative for congestion and nosebleeds.    Eyes: Negative for pain and visual disturbance.   Respiratory: Negative for cough, chest tightness, shortness of breath and wheezing.    Cardiovascular: Negative for chest pain, palpitations and leg swelling.   Gastrointestinal: Negative for abdominal pain, blood in stool, constipation, diarrhea, nausea and vomiting.   Genitourinary: Negative for difficulty urinating, flank pain, frequency, hematuria and urgency.   Musculoskeletal: Positive for back pain. Negative for arthralgias and myalgias.   Neurological: Negative for dizziness, weakness, numbness and headaches.   Psychiatric/Behavioral: The patient is not nervous/anxious.        ECOG Performance Status: 1   Objective:      Vitals:   Vitals:    08/03/22 1448   BP: 133/80   BP Location: Left arm   Patient Position: Sitting   BP Method: Small (Automatic)   Pulse: (!) 58   Resp: 18   Temp: 98.1 °F (36.7 °C)   TempSrc: Other (see comments)   SpO2: 99%   Weight: 58 kg (127 lb 13.9 oz)   Height: 5' 7" (1.702 m)     Physical Exam  Constitutional:       General: He is not in acute distress.     Appearance: Normal appearance.   HENT:      Head: Normocephalic and atraumatic.   Eyes:      Pupils: Pupils are equal, round, and reactive to light.   Cardiovascular:      Rate and Rhythm: Normal rate and regular rhythm.      Pulses: Normal pulses.      Heart sounds: Normal heart sounds. No murmur heard.  Pulmonary:      Effort: Pulmonary effort is normal. No respiratory distress.      Breath sounds: Normal breath sounds. No wheezing.   Abdominal:      General: Abdomen is flat. Bowel sounds are normal. There is no distension.      Palpations: Abdomen is soft. There is no mass.      Tenderness: There is no abdominal tenderness.   Musculoskeletal:         General: No swelling or deformity. Normal range " of motion.   Skin:     Coloration: Skin is not jaundiced.   Neurological:      General: No focal deficit present.      Mental Status: He is alert and oriented to person, place, and time. Mental status is at baseline.   Psychiatric:         Mood and Affect: Mood normal.         Laboratory Data:  Lab Visit on 08/03/2022   Component Date Value Ref Range Status    WBC 08/03/2022 7.97  3.90 - 12.70 K/uL Final    RBC 08/03/2022 4.39 (A) 4.60 - 6.20 M/uL Final    Hemoglobin 08/03/2022 13.0 (A) 14.0 - 18.0 g/dL Final    Hematocrit 08/03/2022 41.6  40.0 - 54.0 % Final    MCV 08/03/2022 95  82 - 98 fL Final    MCH 08/03/2022 29.6  27.0 - 31.0 pg Final    MCHC 08/03/2022 31.3 (A) 32.0 - 36.0 g/dL Final    RDW 08/03/2022 15.4 (A) 11.5 - 14.5 % Final    Platelets 08/03/2022 279  150 - 450 K/uL Final    MPV 08/03/2022 10.1  9.2 - 12.9 fL Final    Immature Granulocytes 08/03/2022 0.1  0.0 - 0.5 % Final    Gran # (ANC) 08/03/2022 3.4  1.8 - 7.7 K/uL Final    Immature Grans (Abs) 08/03/2022 0.01  0.00 - 0.04 K/uL Final    Comment: Mild elevation in immature granulocytes is non specific and   can be seen in a variety of conditions including stress response,   acute inflammation, trauma and pregnancy. Correlation with other   laboratory and clinical findings is essential.      Lymph # 08/03/2022 3.1  1.0 - 4.8 K/uL Final    Mono # 08/03/2022 0.9  0.3 - 1.0 K/uL Final    Eos # 08/03/2022 0.5  0.0 - 0.5 K/uL Final    Baso # 08/03/2022 0.04  0.00 - 0.20 K/uL Final    nRBC 08/03/2022 0  0 /100 WBC Final    Gran % 08/03/2022 42.6  38.0 - 73.0 % Final    Lymph % 08/03/2022 38.8  18.0 - 48.0 % Final    Mono % 08/03/2022 11.4  4.0 - 15.0 % Final    Eosinophil % 08/03/2022 6.6  0.0 - 8.0 % Final    Basophil % 08/03/2022 0.5  0.0 - 1.9 % Final    Differential Method 08/03/2022 Automated   Final    Sodium 08/03/2022 138  136 - 145 mmol/L Final    Potassium 08/03/2022 3.7  3.5 - 5.1 mmol/L Final    Chloride 08/03/2022  104  95 - 110 mmol/L Final    CO2 08/03/2022 26  23 - 29 mmol/L Final    Glucose 08/03/2022 83  70 - 110 mg/dL Final    BUN 08/03/2022 10  8 - 23 mg/dL Final    Creatinine 08/03/2022 0.9  0.5 - 1.4 mg/dL Final    Calcium 08/03/2022 9.0  8.7 - 10.5 mg/dL Final    Total Protein 08/03/2022 7.4  6.0 - 8.4 g/dL Final    Albumin 08/03/2022 3.1 (A) 3.5 - 5.2 g/dL Final    Total Bilirubin 08/03/2022 0.7  0.1 - 1.0 mg/dL Final    Comment: For infants and newborns, interpretation of results should be based  on gestational age, weight and in agreement with clinical  observations.    Premature Infant recommended reference ranges:  Up to 24 hours.............<8.0 mg/dL  Up to 48 hours............<12.0 mg/dL  3-5 days..................<15.0 mg/dL  6-29 days.................<15.0 mg/dL      Alkaline Phosphatase 08/03/2022 82  55 - 135 U/L Final    AST 08/03/2022 21  10 - 40 U/L Final    ALT 08/03/2022 11  10 - 44 U/L Final    Anion Gap 08/03/2022 8  8 - 16 mmol/L Final    eGFR 08/03/2022 >60.0  >60 mL/min/1.73 m^2 Final    CEA 08/03/2022 12.1 (A) 0.0 - 5.0 ng/mL Final    Comment: CEA Normal Range:  Non-Smokers: 0-3.0 ng/mL  Smokers:     0-5.0 ng/mL    The testing method is a chemiluminescent microparticle immunoassay   manufactured by [employer] and performed on the [employer]   or   [employer] system. Values obtained with different assay manufacturers   for   methods may be different and cannot be used interchangeably.     CEA has increased to 12.1 from 4.8      Assessment and Plan        1. 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 Primary hypertension      1.  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  Guardant 360 was negative for BRAF, VIRI, HER2 amplification.   Pretreatment CEA 57.  We had a long and sonia discussion with him about his diagnosis. Unfortunately, the disease is not curable but remains treatable and he has good  performance status.   Restaging scans after 7 cycles showed significant reduction in colonic mass and liver mass.   we switched to maintenance as of cycle 8 with 5FU + Pema  Restaging scans from March 2022 show stable disease.   MRI on 7/18/22 showing hepatic progression of disease in the right hepatic lobe noted on the exam. In addition, he has had an increase in the CEA today. However, he is tolerating treatment very well. Lab work is otherwise stable. Recc to proceed at this time and discuss his case at [facility].     --Proceed with cycle 33 of 5-FU and Avastin today  --Plan to discuss patient at [facility] to discuss localregional therapy options  --Follow-up with patient following above recommendations    2. Hypertension   --Controlled   --refill given for his Amlodipine today.     Pte and family members displayed understanding of the above encounter and treatment plan. All thoughtful questions were answered to their satisfaction. Pte was advised to notify the care team or proceed to the ER if signs and symptoms worsen.       FAN Meier, PA-C  Physician Assistant Certified  Dept of Hematology/Oncology  PA-C to Dr. Mueller, Dr. Schuster and Dr. Castellon    Route Chart for Scheduling    Med Onc Chart Routing      Follow up with physician 2 weeks. For 5-FU + avastin   Follow up with RACHEL 4 weeks. For 5-FU + avastin   Infusion scheduling note    Injection scheduling note    Labs CBC, CMP, CEA and urinalysis   Lab interval: every 2 weeks     Imaging    Pharmacy appointment    Other referrals          Treatment Plan Information   OP COLORECTAL FOLFOX + BEVACIZUMAB Q2W   Torres Pantoja MD   Upcoming Treatment Dates - OP COLORECTAL FOLFOX + BEVACIZUMAB Q2W    8/4/2022       Chemotherapy       bevacizumab (AVASTIN) 300 mg in sodium chloride 0.9% 100 mL chemo infusion       fluorouraciL 2,400 mg/m2 = 3,960 mg in sodium chloride 0.9% 100 mL chemo infusion       Antiemetics       ondansetron injection 8 mg  8/18/2022        Chemotherapy       bevacizumab (AVASTIN) 300 mg in sodium chloride 0.9% 100 mL chemo infusion       fluorouraciL 2,400 mg/m2 = 3,960 mg in sodium chloride 0.9% 100 mL chemo infusion       Antiemetics       ondansetron injection 8 mg

## 2022-08-04 ENCOUNTER — INFUSION (OUTPATIENT)
Dept: INFUSION THERAPY | Facility: HOSPITAL | Age: 72
End: 2022-08-04
Payer: MEDICARE

## 2022-08-04 VITALS
WEIGHT: 127.19 LBS | SYSTOLIC BLOOD PRESSURE: 145 MMHG | HEIGHT: 67 IN | HEART RATE: 60 BPM | TEMPERATURE: 98 F | DIASTOLIC BLOOD PRESSURE: 78 MMHG | RESPIRATION RATE: 18 BRPM | BODY MASS INDEX: 19.96 KG/M2

## 2022-08-04 DIAGNOSIS — C18.9 METASTATIC COLON CANCER TO LIVER: Primary | ICD-10-CM

## 2022-08-04 DIAGNOSIS — C78.7 METASTATIC COLON CANCER TO LIVER: Primary | ICD-10-CM

## 2022-08-04 PROCEDURE — 25000003 PHARM REV CODE 250: Performed by: PHYSICIAN ASSISTANT

## 2022-08-04 PROCEDURE — 63600175 PHARM REV CODE 636 W HCPCS: Mod: JG | Performed by: PHYSICIAN ASSISTANT

## 2022-08-04 PROCEDURE — 96375 TX/PRO/DX INJ NEW DRUG ADDON: CPT

## 2022-08-04 PROCEDURE — 96413 CHEMO IV INFUSION 1 HR: CPT

## 2022-08-04 PROCEDURE — 96416 CHEMO PROLONG INFUSE W/PUMP: CPT

## 2022-08-04 RX ORDER — SODIUM CHLORIDE 0.9 % (FLUSH) 0.9 %
10 SYRINGE (ML) INJECTION
Status: CANCELLED | OUTPATIENT
Start: 2022-08-04

## 2022-08-04 RX ORDER — DIPHENHYDRAMINE HYDROCHLORIDE 50 MG/ML
50 INJECTION INTRAMUSCULAR; INTRAVENOUS ONCE AS NEEDED
Status: DISCONTINUED | OUTPATIENT
Start: 2022-08-04 | End: 2022-08-04 | Stop reason: HOSPADM

## 2022-08-04 RX ORDER — ONDANSETRON 2 MG/ML
8 INJECTION INTRAMUSCULAR; INTRAVENOUS
Status: CANCELLED | OUTPATIENT
Start: 2022-08-04

## 2022-08-04 RX ORDER — HEPARIN 100 UNIT/ML
500 SYRINGE INTRAVENOUS
Status: DISCONTINUED | OUTPATIENT
Start: 2022-08-04 | End: 2022-08-04 | Stop reason: HOSPADM

## 2022-08-04 RX ORDER — DIPHENHYDRAMINE HYDROCHLORIDE 50 MG/ML
50 INJECTION INTRAMUSCULAR; INTRAVENOUS ONCE AS NEEDED
Status: CANCELLED | OUTPATIENT
Start: 2022-08-04

## 2022-08-04 RX ORDER — ONDANSETRON 2 MG/ML
8 INJECTION INTRAMUSCULAR; INTRAVENOUS
Status: COMPLETED | OUTPATIENT
Start: 2022-08-04 | End: 2022-08-04

## 2022-08-04 RX ORDER — HEPARIN 100 UNIT/ML
500 SYRINGE INTRAVENOUS
Status: CANCELLED | OUTPATIENT
Start: 2022-08-04

## 2022-08-04 RX ORDER — EPINEPHRINE 0.3 MG/.3ML
0.3 INJECTION SUBCUTANEOUS ONCE AS NEEDED
Status: CANCELLED | OUTPATIENT
Start: 2022-08-04

## 2022-08-04 RX ORDER — EPINEPHRINE 0.3 MG/.3ML
0.3 INJECTION SUBCUTANEOUS ONCE AS NEEDED
Status: DISCONTINUED | OUTPATIENT
Start: 2022-08-04 | End: 2022-08-04 | Stop reason: HOSPADM

## 2022-08-04 RX ORDER — SODIUM CHLORIDE 0.9 % (FLUSH) 0.9 %
10 SYRINGE (ML) INJECTION
Status: DISCONTINUED | OUTPATIENT
Start: 2022-08-04 | End: 2022-08-04 | Stop reason: HOSPADM

## 2022-08-04 RX ADMIN — FLUOROURACIL 3960 MG: 50 INJECTION, SOLUTION INTRAVENOUS at 12:08

## 2022-08-04 RX ADMIN — ONDANSETRON 8 MG: 2 INJECTION, SOLUTION INTRAMUSCULAR; INTRAVENOUS at 11:08

## 2022-08-04 RX ADMIN — BEVACIZUMAB 300 MG: 100 INJECTION, SOLUTION INTRAVENOUS at 11:08

## 2022-08-04 NOTE — PLAN OF CARE
Patient tolerated treatment today. NAD noted, VS stable. Patient placed on CADD pump today, settings checked by 2 RN's, pump running at discharge, Patient to RTC on Saturday 8/6/22 at 1030 for pump D/C

## 2022-08-06 ENCOUNTER — INFUSION (OUTPATIENT)
Dept: INFUSION THERAPY | Facility: HOSPITAL | Age: 72
End: 2022-08-06
Payer: MEDICARE

## 2022-08-06 VITALS
SYSTOLIC BLOOD PRESSURE: 121 MMHG | BODY MASS INDEX: 19.96 KG/M2 | HEART RATE: 66 BPM | HEIGHT: 67 IN | TEMPERATURE: 98 F | WEIGHT: 127.19 LBS | RESPIRATION RATE: 18 BRPM | DIASTOLIC BLOOD PRESSURE: 76 MMHG

## 2022-08-06 DIAGNOSIS — C78.7 METASTATIC COLON CANCER TO LIVER: Primary | ICD-10-CM

## 2022-08-06 DIAGNOSIS — C18.9 METASTATIC COLON CANCER TO LIVER: Primary | ICD-10-CM

## 2022-08-06 PROCEDURE — 63600175 PHARM REV CODE 636 W HCPCS: Performed by: INTERNAL MEDICINE

## 2022-08-06 PROCEDURE — A4216 STERILE WATER/SALINE, 10 ML: HCPCS | Performed by: INTERNAL MEDICINE

## 2022-08-06 PROCEDURE — 25000003 PHARM REV CODE 250: Performed by: INTERNAL MEDICINE

## 2022-08-06 RX ORDER — HEPARIN 100 UNIT/ML
500 SYRINGE INTRAVENOUS
Status: DISCONTINUED | OUTPATIENT
Start: 2022-08-06 | End: 2022-08-06 | Stop reason: HOSPADM

## 2022-08-06 RX ORDER — SODIUM CHLORIDE 0.9 % (FLUSH) 0.9 %
10 SYRINGE (ML) INJECTION
Status: DISCONTINUED | OUTPATIENT
Start: 2022-08-06 | End: 2022-08-06 | Stop reason: HOSPADM

## 2022-08-06 RX ADMIN — Medication 10 ML: at 11:08

## 2022-08-06 RX ADMIN — HEPARIN SODIUM (PORCINE) LOCK FLUSH IV SOLN 100 UNIT/ML 500 UNITS: 100 SOLUTION at 11:08

## 2022-08-06 NOTE — PLAN OF CARE
ED Attending  CC: Leticia    Department of Emergency Medicine   ED  Provider Note  Admit Date/RoomTime: 8/12/2019 10:44 AM  ED Room: ANNIA/ANNIA  MRN: [id number]  Chief Complaint       Abdominal Pain (Epigastric abd pain and diarrhea since early June )    History of Present Illness   Source of history provided by:  patient and spouse/SO. History/Exam Limitations: none. Torie Monique is a 56 y.o. old female who has a past medical history of:   Past Medical History:   Diagnosis Date    Anxiety     Depression     Obesity     presents to the emergency department by private vehicle, for complaints of gradual onset, still present, intermittent episodes aching, cramping pain in the epigastrium without radiation which began 2 month(s) prior to arrival.   Patient states over the past several months her primary care provider has changed her antidepressant medications, and she is unsure if the change in medicines has caused this pain as a side effect. Since onset the symptoms have been intermittent. The pain is associated with nausea and frequent diarrhea. The pain is aggravated by nothing specific and relieved by nothing. She states the diarrhea that she is having occurs on a daily basis, several times a day. She states anytime she tries to eat, \"it goes right through me. \"  She denies any actual vomiting, and denies any blood in the stools. There has been NO back pain, chills, cloudy urine, constipation, dysuria, headache, hematuria, sweating, urinary frequency, urinary incontinence, urinary urgency, vaginal discharge, vaginal itching or vomiting. ROS   Pertinent positives and negatives are stated within HPI, all other systems reviewed and are negative. History reviewed. No pertinent surgical history. Social History:  reports that she has been smoking cigarettes. She has a 16.00 pack-year smoking history. She has never used smokeless tobacco. She reports that she does not drink alcohol.   Family History: family Pt tolerated infusion today. NAD. Port flushed + blood return, and hep locked and deaccessed. Declined AVS. Pt uses [facility]. Discharged home

## 2022-08-10 ENCOUNTER — DOCUMENTATION ONLY (OUTPATIENT)
Dept: HEMATOLOGY/ONCOLOGY | Facility: CLINIC | Age: 72
End: 2022-08-10
Payer: MEDICARE

## 2022-08-10 NOTE — PROGRESS NOTES
[facility]     Colorectal Liver Metastasis Tumor Board Note        Date: 8/4/2022     Case Overview: Mr. Downs (72M) was originally diagnosed in 4/2021 with adenocarcinoma of the transverse colon with synchronous liver metastases. He started on FOLFOX + bevacizumab on 5/6/21 with a transition on 8/12/21 to his current regimen of maintenance 5-FU + bevacizumab.      Participants: medical oncology, surgical oncology, transplant surgeons, interventional radiology, and oncology navigator      Imaging Reviewed: MRI 7/18/2022     Radiology Review: Most recent MRI shows three liver lesions. The largest in the posterior right hepatic lobe measures 8.4 cm in maximum diameter, previously 7.2 cm. Left lobe lesion has enhancement features of hemangioma and remains stable. Additional lesion in segment 8 is stable in size but remains indeterminate as its imaging features are not typical of hemangioma. No pulmonary nodules. Chronic SMV/PV thrombus.     Orders/Referrals: Will forward to surgical oncology/transplant for resection, and IR for locoregional options.      Board Recommendations: Review of his imaging revealed progression of the main hepatic lobe lesion while no changes seen in additional lesions. He may benefit from hepatic resection +/- ablation of the remaining lesion. We will present him at the CRC conference for consideration of his primary colorectal tumor.

## 2022-08-11 ENCOUNTER — TELEPHONE (OUTPATIENT)
Dept: INTERVENTIONAL RADIOLOGY/VASCULAR | Facility: CLINIC | Age: 72
End: 2022-08-11
Payer: MEDICARE

## 2022-08-11 NOTE — TELEPHONE ENCOUNTER
Called patient to schedule for a clinic consultation regarding a Y90 procedure. No answer, left voicemail.

## 2022-08-17 ENCOUNTER — OFFICE VISIT (OUTPATIENT)
Dept: HEMATOLOGY/ONCOLOGY | Facility: CLINIC | Age: 72
End: 2022-08-17
Payer: MEDICARE

## 2022-08-17 ENCOUNTER — LAB VISIT (OUTPATIENT)
Dept: LAB | Facility: HOSPITAL | Age: 72
End: 2022-08-17
Attending: INTERNAL MEDICINE
Payer: MEDICARE

## 2022-08-17 VITALS
SYSTOLIC BLOOD PRESSURE: 115 MMHG | TEMPERATURE: 98 F | HEIGHT: 67 IN | DIASTOLIC BLOOD PRESSURE: 71 MMHG | OXYGEN SATURATION: 99 % | RESPIRATION RATE: 18 BRPM | BODY MASS INDEX: 19.3 KG/M2 | HEART RATE: 76 BPM | WEIGHT: 123 LBS

## 2022-08-17 DIAGNOSIS — C18.9 METASTATIC COLON CANCER TO LIVER: ICD-10-CM

## 2022-08-17 DIAGNOSIS — Z79.899 IMMUNODEFICIENCY DUE TO CHEMOTHERAPY: ICD-10-CM

## 2022-08-17 DIAGNOSIS — T45.1X5A IMMUNODEFICIENCY DUE TO CHEMOTHERAPY: ICD-10-CM

## 2022-08-17 DIAGNOSIS — C78.7 METASTATIC COLON CANCER TO LIVER: ICD-10-CM

## 2022-08-17 DIAGNOSIS — N17.9 AKI (ACUTE KIDNEY INJURY): ICD-10-CM

## 2022-08-17 DIAGNOSIS — C78.7 METASTATIC COLON CANCER TO LIVER: Primary | ICD-10-CM

## 2022-08-17 DIAGNOSIS — I10 PRIMARY HYPERTENSION: ICD-10-CM

## 2022-08-17 DIAGNOSIS — D84.821 IMMUNODEFICIENCY DUE TO CHEMOTHERAPY: ICD-10-CM

## 2022-08-17 DIAGNOSIS — C18.9 METASTATIC COLON CANCER TO LIVER: Primary | ICD-10-CM

## 2022-08-17 DIAGNOSIS — R80.9 PROTEINURIA, UNSPECIFIED TYPE: ICD-10-CM

## 2022-08-17 LAB
ALBUMIN SERPL BCP-MCNC: 3.5 G/DL (ref 3.5–5.2)
ALP SERPL-CCNC: 86 U/L (ref 55–135)
ALT SERPL W/O P-5'-P-CCNC: 10 U/L (ref 10–44)
ANION GAP SERPL CALC-SCNC: 13 MMOL/L (ref 8–16)
AST SERPL-CCNC: 25 U/L (ref 10–40)
BASOPHILS # BLD AUTO: 0.06 K/UL (ref 0–0.2)
BASOPHILS NFR BLD: 0.7 % (ref 0–1.9)
BILIRUB SERPL-MCNC: 0.9 MG/DL (ref 0.1–1)
BUN SERPL-MCNC: 13 MG/DL (ref 8–23)
CALCIUM SERPL-MCNC: 9.9 MG/DL (ref 8.7–10.5)
CHLORIDE SERPL-SCNC: 101 MMOL/L (ref 95–110)
CO2 SERPL-SCNC: 25 MMOL/L (ref 23–29)
CREAT SERPL-MCNC: 1.9 MG/DL (ref 0.5–1.4)
DIFFERENTIAL METHOD: ABNORMAL
EOSINOPHIL # BLD AUTO: 0.1 K/UL (ref 0–0.5)
EOSINOPHIL NFR BLD: 1.4 % (ref 0–8)
ERYTHROCYTE [DISTWIDTH] IN BLOOD BY AUTOMATED COUNT: 14.9 % (ref 11.5–14.5)
EST. GFR  (NO RACE VARIABLE): 37.2 ML/MIN/1.73 M^2
GLUCOSE SERPL-MCNC: 72 MG/DL (ref 70–110)
HCT VFR BLD AUTO: 38 % (ref 40–54)
HGB BLD-MCNC: 12.7 G/DL (ref 14–18)
IMM GRANULOCYTES # BLD AUTO: 0.03 K/UL (ref 0–0.04)
IMM GRANULOCYTES NFR BLD AUTO: 0.3 % (ref 0–0.5)
LYMPHOCYTES # BLD AUTO: 1.7 K/UL (ref 1–4.8)
LYMPHOCYTES NFR BLD: 18 % (ref 18–48)
MCH RBC QN AUTO: 31 PG (ref 27–31)
MCHC RBC AUTO-ENTMCNC: 33.4 G/DL (ref 32–36)
MCV RBC AUTO: 93 FL (ref 82–98)
MONOCYTES # BLD AUTO: 1.4 K/UL (ref 0.3–1)
MONOCYTES NFR BLD: 15.3 % (ref 4–15)
NEUTROPHILS # BLD AUTO: 5.9 K/UL (ref 1.8–7.7)
NEUTROPHILS NFR BLD: 64.3 % (ref 38–73)
NRBC BLD-RTO: 0 /100 WBC
PLATELET # BLD AUTO: 322 K/UL (ref 150–450)
PMV BLD AUTO: 10 FL (ref 9.2–12.9)
POTASSIUM SERPL-SCNC: 4.1 MMOL/L (ref 3.5–5.1)
PROT SERPL-MCNC: 7.8 G/DL (ref 6–8.4)
RBC # BLD AUTO: 4.1 M/UL (ref 4.6–6.2)
SODIUM SERPL-SCNC: 139 MMOL/L (ref 136–145)
WBC # BLD AUTO: 9.18 K/UL (ref 3.9–12.7)

## 2022-08-17 PROCEDURE — 99215 PR OFFICE/OUTPT VISIT, EST, LEVL V, 40-54 MIN: ICD-10-PCS | Mod: S$PBB,,, | Performed by: PHYSICIAN ASSISTANT

## 2022-08-17 PROCEDURE — 99213 OFFICE O/P EST LOW 20 MIN: CPT | Mod: PBBFAC | Performed by: PHYSICIAN ASSISTANT

## 2022-08-17 PROCEDURE — 80053 COMPREHEN METABOLIC PANEL: CPT | Performed by: INTERNAL MEDICINE

## 2022-08-17 PROCEDURE — 99999 PR PBB SHADOW E&M-EST. PATIENT-LVL III: CPT | Mod: PBBFAC,,, | Performed by: PHYSICIAN ASSISTANT

## 2022-08-17 PROCEDURE — 99215 OFFICE O/P EST HI 40 MIN: CPT | Mod: S$PBB,,, | Performed by: PHYSICIAN ASSISTANT

## 2022-08-17 PROCEDURE — 82378 CARCINOEMBRYONIC ANTIGEN: CPT | Performed by: INTERNAL MEDICINE

## 2022-08-17 PROCEDURE — 99999 PR PBB SHADOW E&M-EST. PATIENT-LVL III: ICD-10-PCS | Mod: PBBFAC,,, | Performed by: PHYSICIAN ASSISTANT

## 2022-08-17 PROCEDURE — 85025 COMPLETE CBC W/AUTO DIFF WBC: CPT | Performed by: INTERNAL MEDICINE

## 2022-08-17 NOTE — PROGRESS NOTES
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 Downs is a 72 y.o. male   70 years old pleasant AA gentleman, with history of hypertension, presenting with two weeks duration of abdominal cramps, diarrhea, nausea and vomiting. His symptoms started gradually with intermittent generalize crampy abdominal pain, worse with food. That was associated with nausea, reflux and occaisonal vomiting. He tried pepto-bismol and imodium with no relief, and hence he presented to ER.   He lost significant amount of weight, but cannot quantify the amount or the time period. He has also been feeling weaker than usual.   He hasn't seen a healthcare provider in over than 10 years. He has never had a colonoscopy.   In the ER. His labs were significant of microcytic anemia of 9.7 g/dl, MCV 77, and Platelets counts of 495. CT of the abdomen and pelvis showed " Large mass in the transverse colon highly suspicious for adenocarcinoma resulting in at least high-grade partial obstruction with dilation of proximal colon and small bowel. Soft tissue nodules in the adjacent mesentery are suspicious for pily metastases and/or mesenteric implants.  Numerous hepatic masses measuring up to 11.2 cm most likely related to metastatic disease.  There also several " "small subcapsular lesions which could reflect additional metastatic disease"   CT chest was negative to metastatic disease.   He underwent colonoscopy and stent placement. 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 HER 2 negative, VIRI/SHERI NGS was cancelled due to insufficient quantity   Guardant 360 was sent, negative for KRAS, NRAS, BRAF     He started palliative chemotherapy with FOLFOX+Pema     Restaging scans on 07/22/2021 showed significant decrease in size trasverse colon mass as well liver metastatic lesion.   He was switched to maintenance 5FU+Pema from C8    Restaging scans from 10/05/2021 (after C11) showing stable-mildly improved liver mets.   Restaging scans from 12/28/2021 (after C17) showing stable disease.   Restaging scans from 03/22/22 show stable disease.     MRI on 7/18/22    Impression:     Slight increase in size of the large hepatic metastasis when accounting for differences in imaging technique.  No new disease.     Remaining 2 liver lesions remain stable in size.  The larger lesion in the left hepatic lobe has features typical of a hemangioma.  Lesion at the hepatic dome is difficult to fully characterize due to location and breathing motion artifact, though may represent a hemangioma as well.  This can be followed.     Additional stable chronic findings as above.    Interval History:   He presents today with his daughter prior to cycle 33 of 5FU and Avastin. He reports doing well since his last visit with intact energy level, appetite, and positive outlook. He is eating well and has a good appetite. He did lose about 4 lbs since his previous visit but overall, he is doing well.  We discussed the findings on his MRI abdomen during his previous visit, concerning for progression of disease with the plan of discussing him in liver conference with " various options to be discussed with him expressing agreement and understanding. No other complaints or concerns today. Overall, doing well.     Presents with his daughter today. ECOG status is 1.       Past Medical History:   Past Medical History:   Diagnosis Date    MARIA A (acute kidney injury) 8/18/2022    Hypertension     Metastatic colon cancer to liver 4/22/2021       Past Surgical HIstory:   Past Surgical History:   Procedure Laterality Date    COLONOSCOPY N/A 4/20/2021    Procedure: COLONOSCOPY with possible stent;  Surgeon: EDILMA Bonilla MD;  Location: [facility] (2ND FLR);  Service: Colon and Rectal;  Laterality: N/A;    INSERTION OF TUNNELED CENTRAL VENOUS CATHETER (CVC) WITH SUBCUTANEOUS PORT N/A 5/3/2021    Procedure: [id number];  Surgeon: Francisco Layton MD;  [facility];  Service: Vascular;  Laterality: N/A;       Family History: History reviewed. No pertinent family history.    Social History:  reports that he has never smoked. He has never used smokeless tobacco. He reports current alcohol use.    Allergies:  Review of patient's allergies indicates:  No Known Allergies    Medications:  Current Outpatient Medications   Medication Sig Dispense Refill    acetaminophen (TYLENOL) 500 MG tablet Take 1 tablet (500 mg total) by mouth every 6 (six) hours as needed for Pain (alternate with ibuprofen).  0    amLODIPine (NORVASC) 10 MG tablet Take 1 tablet (10 mg total) by mouth once daily. 90 tablet 3    ibuprofen (ADVIL,MOTRIN) 400 MG tablet Take 1 tablet (400 mg total) by mouth every 6 (six) hours as needed for Other (pain). Alternate with tylenol      LIDOcaine-prilocaine (EMLA) cream Apply topically as needed (Apply one hour before treatment). 30 g 5    ondansetron (ZOFRAN) 4 MG tablet Take 1 tablet (4 mg total) by mouth every 8 (eight) hours as needed for Nausea. 30 tablet 3     Current Facility-Administered Medications   Medication Dose Route Frequency Provider Last Rate  "Last Admin    sars-cov-2 (covid-19) 30 mcg/0.3 ml injection 0.3 mL  0.3 mL Intramuscular 1 time in Clinic/HOD Idania Rock LPN           Review of Systems   Constitutional: Negative for appetite change, fatigue, fever and unexpected weight change.   HENT: Negative for congestion and nosebleeds.    Eyes: Negative for pain and visual disturbance.   Respiratory: Negative for cough, chest tightness, shortness of breath and wheezing.    Cardiovascular: Negative for chest pain, palpitations and leg swelling.   Gastrointestinal: Negative for abdominal pain, blood in stool, constipation, diarrhea, nausea and vomiting.   Genitourinary: Negative for difficulty urinating, flank pain, frequency, hematuria and urgency.   Musculoskeletal: Positive for back pain. Negative for arthralgias and myalgias.   Neurological: Negative for dizziness, weakness, numbness and headaches.   Psychiatric/Behavioral: The patient is not nervous/anxious.        ECOG Performance Status: 1   Objective:      Vitals:   Vitals:    08/17/22 1543   BP: 115/71   BP Location: Left arm   Patient Position: Sitting   BP Method: Small (Automatic)   Pulse: 76   Resp: 18   Temp: 98.1 °F (36.7 °C)   TempSrc: Other (see comments)   SpO2: 99%   Weight: 55.8 kg (123 lb 0.3 oz)   Height: 5' 7" (1.702 m)     Physical Exam  Constitutional:       General: He is not in acute distress.     Appearance: Normal appearance.   HENT:      Head: Normocephalic and atraumatic.   Eyes:      Pupils: Pupils are equal, round, and reactive to light.   Cardiovascular:      Rate and Rhythm: Normal rate and regular rhythm.      Pulses: Normal pulses.      Heart sounds: Normal heart sounds. No murmur heard.  Pulmonary:      Effort: Pulmonary effort is normal. No respiratory distress.      Breath sounds: Normal breath sounds. No wheezing.   Abdominal:      General: Abdomen is flat. Bowel sounds are normal. There is no distension.      Palpations: Abdomen is soft. There is no mass.      " Tenderness: There is no abdominal tenderness.   Musculoskeletal:         General: No swelling or deformity. Normal range of motion.   Skin:     Coloration: Skin is not jaundiced.   Neurological:      General: No focal deficit present.      Mental Status: He is alert and oriented to person, place, and time. Mental status is at baseline.   Psychiatric:         Mood and Affect: Mood normal.         Laboratory Data:  Lab Visit on 08/17/2022   Component Date Value Ref Range Status    WBC 08/17/2022 9.18  3.90 - 12.70 K/uL Final    RBC 08/17/2022 4.10 (A) 4.60 - 6.20 M/uL Final    Hemoglobin 08/17/2022 12.7 (A) 14.0 - 18.0 g/dL Final    Hematocrit 08/17/2022 38.0 (A) 40.0 - 54.0 % Final    MCV 08/17/2022 93  82 - 98 fL Final    MCH 08/17/2022 31.0  27.0 - 31.0 pg Final    MCHC 08/17/2022 33.4  32.0 - 36.0 g/dL Final    RDW 08/17/2022 14.9 (A) 11.5 - 14.5 % Final    Platelets 08/17/2022 322  150 - 450 K/uL Final    MPV 08/17/2022 10.0  9.2 - 12.9 fL Final    Immature Granulocytes 08/17/2022 0.3  0.0 - 0.5 % Final    Gran # (ANC) 08/17/2022 5.9  1.8 - 7.7 K/uL Final    Immature Grans (Abs) 08/17/2022 0.03  0.00 - 0.04 K/uL Final    Comment: Mild elevation in immature granulocytes is non specific and   can be seen in a variety of conditions including stress response,   acute inflammation, trauma and pregnancy. Correlation with other   laboratory and clinical findings is essential.      Lymph # 08/17/2022 1.7  1.0 - 4.8 K/uL Final    Mono # 08/17/2022 1.4 (A) 0.3 - 1.0 K/uL Final    Eos # 08/17/2022 0.1  0.0 - 0.5 K/uL Final    Baso # 08/17/2022 0.06  0.00 - 0.20 K/uL Final    nRBC 08/17/2022 0  0 /100 WBC Final    Gran % 08/17/2022 64.3  38.0 - 73.0 % Final    Lymph % 08/17/2022 18.0  18.0 - 48.0 % Final    Mono % 08/17/2022 15.3 (A) 4.0 - 15.0 % Final    Eosinophil % 08/17/2022 1.4  0.0 - 8.0 % Final    Basophil % 08/17/2022 0.7  0.0 - 1.9 % Final    Differential Method 08/17/2022 Automated   Final     Sodium 08/17/2022 139  136 - 145 mmol/L Final    Potassium 08/17/2022 4.1  3.5 - 5.1 mmol/L Final    Chloride 08/17/2022 101  95 - 110 mmol/L Final    CO2 08/17/2022 25  23 - 29 mmol/L Final    Glucose 08/17/2022 72  70 - 110 mg/dL Final    BUN 08/17/2022 13  8 - 23 mg/dL Final    Creatinine 08/17/2022 1.9 (A) 0.5 - 1.4 mg/dL Final    Calcium 08/17/2022 9.9  8.7 - 10.5 mg/dL Final    Total Protein 08/17/2022 7.8  6.0 - 8.4 g/dL Final    Albumin 08/17/2022 3.5  3.5 - 5.2 g/dL Final    Total Bilirubin 08/17/2022 0.9  0.1 - 1.0 mg/dL Final    Comment: For infants and newborns, interpretation of results should be based  on gestational age, weight and in agreement with clinical  observations.    Premature Infant recommended reference ranges:  Up to 24 hours.............<8.0 mg/dL  Up to 48 hours............<12.0 mg/dL  3-5 days..................<15.0 mg/dL  6-29 days.................<15.0 mg/dL      Alkaline Phosphatase 08/17/2022 86  55 - 135 U/L Final    AST 08/17/2022 25  10 - 40 U/L Final    ALT 08/17/2022 10  10 - 44 U/L Final    Anion Gap 08/17/2022 13  8 - 16 mmol/L Final    eGFR 08/17/2022 37.2 (A) >60 mL/min/1.73 m^2 Final   Lab Visit on 08/17/2022   Component Date Value Ref Range Status    Specimen UA 08/17/2022 Urine, Unspecified   Final    Color, UA 08/17/2022 Yellow  Yellow, Straw, Marixa Final    Appearance, UA 08/17/2022 Hazy (A) Clear Final    pH, UA 08/17/2022 6.0  5.0 - 8.0 Final    Specific Ald[place], UA 08/17/2022 >1.030 (A) 1.005 - 1.030 Final    Protein, UA 08/17/2022 2+ (A) Negative Final    Comment: Recommend a 24 hour urine protein or a urine   protein/creatinine ratio if globulin induced proteinuria is  clinically suspected.      Glucose, UA 08/17/2022 Trace (A) Negative Final    Ketones, UA 08/17/2022 Trace (A) Negative Final    Bilirubin (UA) 08/17/2022 1+ (A) Negative Final    Comment: Positive urine bilirubin is not confirmed. Correlate with   serum bilirubin and  clinical presentation.      Occult Blood UA 08/17/2022 Trace (A) Negative Final    Nitrite, UA 08/17/2022 Negative  Negative Final    Leukocytes, UA 08/17/2022 1+ (A) Negative Final    RBC, UA 08/17/2022 22 (A) 0 - 4 /hpf Final    WBC, UA 08/17/2022 15 (A) 0 - 5 /hpf Final    Bacteria 08/17/2022 Few (A) None-Occ /hpf Final    Hyaline Casts, UA 08/17/2022 10 (A) 0-1/lpf /lpf Final    Microscopic Comment 08/17/2022 SEE COMMENT   Final    Comment: Other formed elements not mentioned in the report are not   present in the microscopic examination.      CEA has increased to 12.1 from 4.8      Assessment and Plan        1. 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 Primary hypertension    4. MARIA A (acute kidney injury)    5. Proteinuria, unspecified type      1,2.  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  Guardant 360 was negative for BRAF, VIRI, HER2 amplification.   Pretreatment CEA 57.  We had a long and sonia discussion with him about his diagnosis. Unfortunately, the disease is not curable but remains treatable and he has good performance status.   Restaging scans after 7 cycles showed significant reduction in colonic mass and liver mass.   we switched to maintenance as of cycle 8 with 5FU + Pema  Restaging scans from March 2022 show stable disease.   MRI on 7/18/22 showing hepatic progression of disease in the right hepatic lobe noted on the exam. In addition, he has had an increase in the CEA today. However, he is tolerating treatment very well. Lab work is otherwise stable. [facility] to proceed at this time and discuss his case at [facility]. Will discuss his case with Dr. Bonilla as well.     --Continues to do well and tolerating treatment well. Lab work is adequate. Proceed with cycle 34 of 5-FU and avastin tomorrow.  --Plan to discuss patient at [facility] to discuss localregional therapy options with Dr. Bonilla.   --has f/u with Dr. Bonilla next  week as well, 8/24/2022  --Follow-up with patient following above recommendations    3. Hypertension   --Well Controlled   --continue with Amlodipine today.     4. MARIA A, proteinuria  --Will give 1L of NS with 0.9% with chemotherapy tomorrow and on day of pump d/c  --Will encourage patient to increase water intake as well.   --Has 2+ protein within the urine per his UA. Are ok to proceed with Avastin, but will perform 24 hour urine test as well.     Pte and family members displayed understanding of the above encounter and treatment plan. All thoughtful questions were answered to their satisfaction. Pte was advised to notify the care team or proceed to the ER if signs and symptoms worsen. Discussed patient with Dr. Schuster as well.       FAN Meier, PA-C  Physician Assistant Certified  Dept of Hematology/Oncology  PA-C to Dr. Mueller, Dr. Schuster and Dr. Castellon    Route Chart for Scheduling    Med Onc Chart Routing      Follow up with physician 2 weeks. For 5-FU + avastin   Follow up with RACHEL 4 weeks. For 5-FU + avastin   Infusion scheduling note    Injection scheduling note    Labs CBC, CMP, CEA and urinalysis   Lab interval: every 2 weeks  Please schedule 24 hour urine test as well.    Imaging    Pharmacy appointment    Other referrals          Treatment Plan Information   OP COLORECTAL FOLFOX + BEVACIZUMAB Q2W   Torres Pantoja MD   Upcoming Treatment Dates - OP COLORECTAL FOLFOX + BEVACIZUMAB Q2W    8/18/2022       Chemotherapy       bevacizumab (AVASTIN) 300 mg in sodium chloride 0.9% 100 mL chemo infusion       fluorouraciL 2,400 mg/m2 = 3,890 mg in sodium chloride 0.9% 100 mL chemo infusion       Antiemetics       ondansetron injection 8 mg

## 2022-08-18 ENCOUNTER — INFUSION (OUTPATIENT)
Dept: INFUSION THERAPY | Facility: HOSPITAL | Age: 72
End: 2022-08-18
Payer: MEDICARE

## 2022-08-18 VITALS
RESPIRATION RATE: 18 BRPM | HEART RATE: 77 BPM | SYSTOLIC BLOOD PRESSURE: 120 MMHG | TEMPERATURE: 98 F | DIASTOLIC BLOOD PRESSURE: 71 MMHG

## 2022-08-18 DIAGNOSIS — N17.9 AKI (ACUTE KIDNEY INJURY): Primary | ICD-10-CM

## 2022-08-18 DIAGNOSIS — C78.7 METASTATIC COLON CANCER TO LIVER: ICD-10-CM

## 2022-08-18 DIAGNOSIS — C18.9 METASTATIC COLON CANCER TO LIVER: ICD-10-CM

## 2022-08-18 LAB — CEA SERPL-MCNC: 12.7 NG/ML (ref 0–5)

## 2022-08-18 PROCEDURE — 96375 TX/PRO/DX INJ NEW DRUG ADDON: CPT

## 2022-08-18 PROCEDURE — 25000003 PHARM REV CODE 250: Performed by: PHYSICIAN ASSISTANT

## 2022-08-18 PROCEDURE — 96416 CHEMO PROLONG INFUSE W/PUMP: CPT

## 2022-08-18 PROCEDURE — 63600175 PHARM REV CODE 636 W HCPCS: Mod: JG | Performed by: PHYSICIAN ASSISTANT

## 2022-08-18 PROCEDURE — 96361 HYDRATE IV INFUSION ADD-ON: CPT

## 2022-08-18 PROCEDURE — 96413 CHEMO IV INFUSION 1 HR: CPT

## 2022-08-18 RX ORDER — SODIUM CHLORIDE 9 MG/ML
INJECTION, SOLUTION INTRAVENOUS
Status: CANCELLED
Start: 2022-08-20

## 2022-08-18 RX ORDER — EPINEPHRINE 0.3 MG/.3ML
0.3 INJECTION SUBCUTANEOUS ONCE AS NEEDED
Status: CANCELLED | OUTPATIENT
Start: 2022-08-18

## 2022-08-18 RX ORDER — HEPARIN 100 UNIT/ML
500 SYRINGE INTRAVENOUS
Status: CANCELLED | OUTPATIENT
Start: 2022-08-20

## 2022-08-18 RX ORDER — SODIUM CHLORIDE 0.9 % (FLUSH) 0.9 %
10 SYRINGE (ML) INJECTION
Status: CANCELLED | OUTPATIENT
Start: 2022-08-20

## 2022-08-18 RX ORDER — EPINEPHRINE 0.3 MG/.3ML
0.3 INJECTION SUBCUTANEOUS ONCE AS NEEDED
Status: DISCONTINUED | OUTPATIENT
Start: 2022-08-18 | End: 2022-08-18 | Stop reason: HOSPADM

## 2022-08-18 RX ORDER — DIPHENHYDRAMINE HYDROCHLORIDE 50 MG/ML
50 INJECTION INTRAMUSCULAR; INTRAVENOUS ONCE AS NEEDED
Status: DISCONTINUED | OUTPATIENT
Start: 2022-08-18 | End: 2022-08-18 | Stop reason: HOSPADM

## 2022-08-18 RX ORDER — HEPARIN 100 UNIT/ML
500 SYRINGE INTRAVENOUS
Status: DISCONTINUED | OUTPATIENT
Start: 2022-08-18 | End: 2022-08-18 | Stop reason: HOSPADM

## 2022-08-18 RX ORDER — SODIUM CHLORIDE 9 MG/ML
INJECTION, SOLUTION INTRAVENOUS
Status: CANCELLED
Start: 2022-08-18

## 2022-08-18 RX ORDER — ONDANSETRON 2 MG/ML
8 INJECTION INTRAMUSCULAR; INTRAVENOUS
Status: COMPLETED | OUTPATIENT
Start: 2022-08-18 | End: 2022-08-18

## 2022-08-18 RX ORDER — SODIUM CHLORIDE 0.9 % (FLUSH) 0.9 %
10 SYRINGE (ML) INJECTION
Status: CANCELLED | OUTPATIENT
Start: 2022-08-18

## 2022-08-18 RX ORDER — DIPHENHYDRAMINE HYDROCHLORIDE 50 MG/ML
50 INJECTION INTRAMUSCULAR; INTRAVENOUS ONCE AS NEEDED
Status: CANCELLED | OUTPATIENT
Start: 2022-08-18

## 2022-08-18 RX ORDER — HEPARIN 100 UNIT/ML
500 SYRINGE INTRAVENOUS
Status: CANCELLED | OUTPATIENT
Start: 2022-08-18

## 2022-08-18 RX ORDER — ONDANSETRON 2 MG/ML
8 INJECTION INTRAMUSCULAR; INTRAVENOUS
Status: CANCELLED | OUTPATIENT
Start: 2022-08-18

## 2022-08-18 RX ORDER — SODIUM CHLORIDE 0.9 % (FLUSH) 0.9 %
10 SYRINGE (ML) INJECTION
Status: DISCONTINUED | OUTPATIENT
Start: 2022-08-18 | End: 2022-08-18 | Stop reason: HOSPADM

## 2022-08-18 RX ORDER — SODIUM CHLORIDE 9 MG/ML
INJECTION, SOLUTION INTRAVENOUS
Status: COMPLETED | OUTPATIENT
Start: 2022-08-18 | End: 2022-08-18

## 2022-08-18 RX ADMIN — ONDANSETRON 8 MG: 2 INJECTION, SOLUTION INTRAMUSCULAR; INTRAVENOUS at 11:08

## 2022-08-18 RX ADMIN — FLUOROURACIL 3890 MG: 50 INJECTION, SOLUTION INTRAVENOUS at 12:08

## 2022-08-18 RX ADMIN — BEVACIZUMAB 280 MG: 100 INJECTION, SOLUTION INTRAVENOUS at 11:08

## 2022-08-18 RX ADMIN — SODIUM CHLORIDE: 0.9 INJECTION, SOLUTION INTRAVENOUS at 11:08

## 2022-08-18 NOTE — PLAN OF CARE
Patient tolerated Avastin/5fu/ivfs well today. 2+ protein in urine, ok to proceed with avastin today per BUD Hernandez NP. Plan to add ivfs to todays tx and D3 and get a 24 Hr urine. CADD pump infusing at 2.2 ml/hr. Settings verified by 2 RNs. Pump to complete at 10am, patient will arrive for 9am for ivfs. NAD noted upon discharge. AVS given. Reviewed upcoming appts. Discharged home, ambulated independently with daughter by side.

## 2022-08-18 NOTE — PLAN OF CARE
Problem: Adult Inpatient Plan of Care  Goal: Optimal Comfort and Wellbeing  Intervention: Provide Person-Centered Care  Flowsheets (Taken 8/18/2022 1117)  Trust Relationship/Rapport:   care explained   questions answered   choices provided   questions encouraged   emotional support provided   reassurance provided   empathic listening provided   thoughts/feelings acknowledged

## 2022-08-20 ENCOUNTER — INFUSION (OUTPATIENT)
Dept: INFUSION THERAPY | Facility: HOSPITAL | Age: 72
End: 2022-08-20
Payer: MEDICARE

## 2022-08-20 VITALS
TEMPERATURE: 98 F | RESPIRATION RATE: 18 BRPM | SYSTOLIC BLOOD PRESSURE: 106 MMHG | HEIGHT: 67 IN | HEART RATE: 65 BPM | DIASTOLIC BLOOD PRESSURE: 70 MMHG | WEIGHT: 123 LBS | BODY MASS INDEX: 19.3 KG/M2

## 2022-08-20 DIAGNOSIS — C78.7 METASTATIC COLON CANCER TO LIVER: ICD-10-CM

## 2022-08-20 DIAGNOSIS — C18.9 METASTATIC COLON CANCER TO LIVER: ICD-10-CM

## 2022-08-20 DIAGNOSIS — N17.9 AKI (ACUTE KIDNEY INJURY): Primary | ICD-10-CM

## 2022-08-20 PROCEDURE — 25000003 PHARM REV CODE 250: Performed by: PHYSICIAN ASSISTANT

## 2022-08-20 PROCEDURE — 63600175 PHARM REV CODE 636 W HCPCS: Performed by: PHYSICIAN ASSISTANT

## 2022-08-20 PROCEDURE — A4216 STERILE WATER/SALINE, 10 ML: HCPCS | Performed by: PHYSICIAN ASSISTANT

## 2022-08-20 PROCEDURE — 96360 HYDRATION IV INFUSION INIT: CPT

## 2022-08-20 RX ORDER — SODIUM CHLORIDE 9 MG/ML
INJECTION, SOLUTION INTRAVENOUS
Status: COMPLETED | OUTPATIENT
Start: 2022-08-20 | End: 2022-08-20

## 2022-08-20 RX ORDER — HEPARIN 100 UNIT/ML
500 SYRINGE INTRAVENOUS
Status: DISCONTINUED | OUTPATIENT
Start: 2022-08-20 | End: 2022-08-20 | Stop reason: HOSPADM

## 2022-08-20 RX ORDER — SODIUM CHLORIDE 0.9 % (FLUSH) 0.9 %
10 SYRINGE (ML) INJECTION
Status: DISCONTINUED | OUTPATIENT
Start: 2022-08-20 | End: 2022-08-20 | Stop reason: HOSPADM

## 2022-08-20 RX ADMIN — HEPARIN 500 UNITS: 100 SYRINGE at 10:08

## 2022-08-20 RX ADMIN — SODIUM CHLORIDE: 0.9 INJECTION, SOLUTION INTRAVENOUS at 09:08

## 2022-08-20 RX ADMIN — Medication 10 ML: at 10:08

## 2022-08-22 DIAGNOSIS — C78.7 COLON CANCER METASTASIZED TO LIVER: Primary | ICD-10-CM

## 2022-08-22 DIAGNOSIS — C18.9 COLON CANCER METASTASIZED TO LIVER: Primary | ICD-10-CM

## 2022-08-24 ENCOUNTER — OFFICE VISIT (OUTPATIENT)
Dept: SURGERY | Facility: CLINIC | Age: 72
End: 2022-08-24
Payer: MEDICARE

## 2022-08-24 VITALS — WEIGHT: 125.69 LBS | BODY MASS INDEX: 19.73 KG/M2 | HEIGHT: 67 IN

## 2022-08-24 DIAGNOSIS — R16.0 LIVER MASSES: ICD-10-CM

## 2022-08-24 DIAGNOSIS — C18.9 METASTATIC COLON CANCER TO LIVER: ICD-10-CM

## 2022-08-24 DIAGNOSIS — C18.4 MALIGNANT NEOPLASM OF TRANSVERSE COLON: Primary | ICD-10-CM

## 2022-08-24 DIAGNOSIS — C78.7 METASTATIC COLON CANCER TO LIVER: ICD-10-CM

## 2022-08-24 PROCEDURE — 99999 PR PBB SHADOW E&M-EST. PATIENT-LVL II: CPT | Mod: PBBFAC,,, | Performed by: COLON & RECTAL SURGERY

## 2022-08-24 PROCEDURE — 99999 PR PBB SHADOW E&M-EST. PATIENT-LVL II: ICD-10-PCS | Mod: PBBFAC,,, | Performed by: COLON & RECTAL SURGERY

## 2022-08-24 PROCEDURE — 99215 PR OFFICE/OUTPT VISIT, EST, LEVL V, 40-54 MIN: ICD-10-PCS | Mod: S$PBB,,, | Performed by: COLON & RECTAL SURGERY

## 2022-08-24 PROCEDURE — 99215 OFFICE O/P EST HI 40 MIN: CPT | Mod: S$PBB,,, | Performed by: COLON & RECTAL SURGERY

## 2022-08-24 PROCEDURE — 99212 OFFICE O/P EST SF 10 MIN: CPT | Mod: PBBFAC | Performed by: COLON & RECTAL SURGERY

## 2022-08-24 NOTE — PROGRESS NOTES
"HPI:  Javier Downs is a 72 y.o. male with history of transverse colon cancer, 3 liver metastases.    S/p systemic chemoRX.  Evidence of one liver lesion progressing  No new disease    Pt feels well.  Appetite good, no N/V.  Wt stable.  Working full time as .  No rectal bleeding.         Past Medical History:   Diagnosis Date    MARIA A (acute kidney injury) 8/18/2022    Hypertension     Metastatic colon cancer to liver 4/22/2021        Past Surgical History:   Procedure Laterality Date    COLONOSCOPY N/A 4/20/2021    Procedure: COLONOSCOPY with possible stent;  Surgeon: EDILMA Bonilla MD;  Location: [facility] ENDO (2ND FLR);  Service: Colon and Rectal;  Laterality: N/A;    INSERTION OF TUNNELED CENTRAL VENOUS CATHETER (CVC) WITH SUBCUTANEOUS PORT N/A 5/3/2021    Procedure: [id number];  Surgeon: Francisco Layton MD;  Location: [facility] OR 2ND FLR;  Service: Vascular;  Laterality: N/A;       Review of patient's allergies indicates:  No Known Allergies    No family history on file.    Social History     Socioeconomic History    Marital status:    Tobacco Use    Smoking status: Never Smoker    Smokeless tobacco: Never Used   Substance and Sexual Activity    Alcohol use: Yes       ROS:  GENERAL: No fever, chills, fatigability or weight loss.  Integument: No rashes, redness, icterus  CHEST: Denies VARGAS, cyanosis, wheezing, cough and sputum production.  CARDIOVASCULAR: Denies chest pain, PND, orthopnea or reduced exercise tolerance.  GI: Denies abd pain, dysphagia, nausea, vomiting, no hematemesis   : Denies burning on urination, no hematuria, no bacteriuria  MSK: No deformities, swelling, joint pain swelling  Neurologic: No HAs, seizures, weakness, paresthesias, gait problems    PE:  General appearance well  Ht 5' 7" (1.702 m)   Wt 57 kg (125 lb 11.2 oz)   BMI 19.69 kg/m²   Sclera/ Skin anicteric  LN none palpable  AT NC EOMI  Neck supple trachea midline   Chest symmetric, nl excursion, no retractions, " breathing comfortably  Abdomen  ND soft NT.  no masses, no organomegaly  EXT - no CCE  Neuro:  Mood/ affect nl, alert and oriented x 3, moves all ext's, gait nl    Assessment:  Metastatic transverse colon cancer   Liver metastases with increase in size of one of three lesions during chemoRX  Good performance status - working full time as .      Plan:  Will reassess primary with CT abd pelvis  MDT discussion - consider combined colon resection and liver resection?  Discussed the above with pt and his daughter Carrie.    RTC in 1 week

## 2022-08-26 ENCOUNTER — HOSPITAL ENCOUNTER (OUTPATIENT)
Dept: RADIOLOGY | Facility: HOSPITAL | Age: 72
Discharge: HOME | End: 2022-08-26
Attending: COLON & RECTAL SURGERY
Payer: MEDICARE

## 2022-08-26 DIAGNOSIS — C18.9 COLON CANCER METASTASIZED TO LIVER: ICD-10-CM

## 2022-08-26 DIAGNOSIS — C78.7 COLON CANCER METASTASIZED TO LIVER: ICD-10-CM

## 2022-08-26 PROCEDURE — 74177 CT ABD & PELVIS W/CONTRAST: CPT | Mod: 26,,, | Performed by: RADIOLOGY

## 2022-08-26 PROCEDURE — 74177 CT ABD & PELVIS W/CONTRAST: CPT | Mod: TC

## 2022-08-26 PROCEDURE — 74177 CT CHEST ABDOMEN PELVIS WITH CONTRAST (XPD): ICD-10-PCS | Mod: 26,,, | Performed by: RADIOLOGY

## 2022-08-26 PROCEDURE — 25500020 PHARM REV CODE 255: Performed by: COLON & RECTAL SURGERY

## 2022-08-26 PROCEDURE — 71260 CT THORAX DX C+: CPT | Mod: 26,,, | Performed by: RADIOLOGY

## 2022-08-26 PROCEDURE — 71260 CT THORAX DX C+: CPT | Mod: TC

## 2022-08-26 PROCEDURE — 71260 CT CHEST ABDOMEN PELVIS WITH CONTRAST (XPD): ICD-10-PCS | Mod: 26,,, | Performed by: RADIOLOGY

## 2022-08-26 RX ADMIN — IOHEXOL 75 ML: 350 INJECTION, SOLUTION INTRAVENOUS at 05:08

## 2022-08-30 ENCOUNTER — TELEPHONE (OUTPATIENT)
Dept: HEMATOLOGY/ONCOLOGY | Facility: CLINIC | Age: 72
End: 2022-08-30
Payer: MEDICARE

## 2022-08-30 NOTE — PROGRESS NOTES
[facility]  Colon, Rectal and Anal Malignancies  Multi-disciplinary Tumor Board     [address]  [place], [place]  Tel: [phone]  Fax: [phone]  [email or web address]     Patient name: Javier Downs   YOB: 1950   MRN: [id number]    Date of discussion: 8/31/2022    Thank you for referring Mr. Downs to our care here at [facility]. Please allow us to summarize our review of records and recommendations.    The following disciplines were present for the discussion:   Colon and Rectal Surgery  Medical Oncology  Radiation Oncology  Pathology  Radiology    Endoscopy reviewed:   Date performed: 4/20/2021  Yes, complete evaluation of colon and rectum performed    CT Chest, Abdomen and Pelvis   Date performed: 8/26/2022   Performed at our facility: Yes  Metastatic disease present: Yes, biopsy confirmed    MRI Abdomen/Pelvis  Date performed: 7/18/2022   Performed at our facility: Yes    Pathology reviewed:   Date pathology finalized: 4/20/2021  Yes, slides reviewed     Pre-treatment CEA:  Date performed: 8/18/2022  CEA Level:  12.7    Pre-treatment Clinical Stage:  TX - not assessed  N+ - Karen disease is suspected  M1 - Metastasis suspected or confirmed    Based on our review of the current information we have made the following recommendations:  72M initially presented with high-grade LBO 2/2 obstructing transverse colon mass with liver mets and possible peritoneal implants s/p transverse colon stent, 8 cycles FOLFOX +Pema, currently on maintenance 5FU + Pema. Plan is for systemic chemotherapy, y-90 radioembolization and diagnostic laparoscopy to evaluate for peritoneal disease    Additional laboratory, imaging, or endoscopic studies  Diagnostic laparoscopy    Therapies  Y-90 liver radioembolization     Clinical research study eligibility - No    Please do not hesitate to contact us for any questions.

## 2022-08-31 ENCOUNTER — DOCUMENTATION ONLY (OUTPATIENT)
Dept: SURGERY | Facility: CLINIC | Age: 72
End: 2022-08-31

## 2022-08-31 ENCOUNTER — TELEPHONE (OUTPATIENT)
Dept: SURGERY | Facility: CLINIC | Age: 72
End: 2022-08-31
Payer: MEDICARE

## 2022-08-31 ENCOUNTER — OFFICE VISIT (OUTPATIENT)
Dept: HEMATOLOGY/ONCOLOGY | Facility: CLINIC | Age: 72
End: 2022-08-31
Payer: MEDICARE

## 2022-08-31 ENCOUNTER — LAB VISIT (OUTPATIENT)
Dept: LAB | Facility: HOSPITAL | Age: 72
End: 2022-08-31
Attending: INTERNAL MEDICINE
Payer: MEDICARE

## 2022-08-31 VITALS
DIASTOLIC BLOOD PRESSURE: 78 MMHG | WEIGHT: 124 LBS | BODY MASS INDEX: 19.46 KG/M2 | OXYGEN SATURATION: 99 % | TEMPERATURE: 98 F | RESPIRATION RATE: 18 BRPM | SYSTOLIC BLOOD PRESSURE: 129 MMHG | HEIGHT: 67 IN | HEART RATE: 62 BPM

## 2022-08-31 DIAGNOSIS — C78.7 METASTATIC COLON CANCER TO LIVER: ICD-10-CM

## 2022-08-31 DIAGNOSIS — C78.7 METASTATIC COLON CANCER TO LIVER: Primary | ICD-10-CM

## 2022-08-31 DIAGNOSIS — Z79.899 IMMUNODEFICIENCY DUE TO CHEMOTHERAPY: ICD-10-CM

## 2022-08-31 DIAGNOSIS — N17.9 AKI (ACUTE KIDNEY INJURY): ICD-10-CM

## 2022-08-31 DIAGNOSIS — T45.1X5A ANEMIA ASSOCIATED WITH CHEMOTHERAPY: ICD-10-CM

## 2022-08-31 DIAGNOSIS — C18.9 METASTATIC COLON CANCER TO LIVER: ICD-10-CM

## 2022-08-31 DIAGNOSIS — I10 PRIMARY HYPERTENSION: ICD-10-CM

## 2022-08-31 DIAGNOSIS — C18.9 METASTATIC COLON CANCER TO LIVER: Primary | ICD-10-CM

## 2022-08-31 DIAGNOSIS — D84.821 IMMUNODEFICIENCY DUE TO CHEMOTHERAPY: ICD-10-CM

## 2022-08-31 DIAGNOSIS — T45.1X5A IMMUNODEFICIENCY DUE TO CHEMOTHERAPY: ICD-10-CM

## 2022-08-31 DIAGNOSIS — R80.9 PROTEINURIA, UNSPECIFIED TYPE: ICD-10-CM

## 2022-08-31 DIAGNOSIS — D64.81 ANEMIA ASSOCIATED WITH CHEMOTHERAPY: ICD-10-CM

## 2022-08-31 LAB
ALBUMIN SERPL BCP-MCNC: 3.1 G/DL (ref 3.5–5.2)
ALP SERPL-CCNC: 83 U/L (ref 55–135)
ALT SERPL W/O P-5'-P-CCNC: 10 U/L (ref 10–44)
ANION GAP SERPL CALC-SCNC: 7 MMOL/L (ref 8–16)
AST SERPL-CCNC: 22 U/L (ref 10–40)
BASOPHILS # BLD AUTO: 0.05 K/UL (ref 0–0.2)
BASOPHILS NFR BLD: 0.6 % (ref 0–1.9)
BILIRUB SERPL-MCNC: 0.5 MG/DL (ref 0.1–1)
BUN SERPL-MCNC: 14 MG/DL (ref 8–23)
CALCIUM SERPL-MCNC: 9.2 MG/DL (ref 8.7–10.5)
CEA SERPL-MCNC: 11.4 NG/ML (ref 0–5)
CHLORIDE SERPL-SCNC: 106 MMOL/L (ref 95–110)
CO2 SERPL-SCNC: 26 MMOL/L (ref 23–29)
CREAT SERPL-MCNC: 1.2 MG/DL (ref 0.5–1.4)
DIFFERENTIAL METHOD: ABNORMAL
EOSINOPHIL # BLD AUTO: 0.5 K/UL (ref 0–0.5)
EOSINOPHIL NFR BLD: 6.1 % (ref 0–8)
ERYTHROCYTE [DISTWIDTH] IN BLOOD BY AUTOMATED COUNT: 14.8 % (ref 11.5–14.5)
EST. GFR  (NO RACE VARIABLE): >60 ML/MIN/1.73 M^2
GLUCOSE SERPL-MCNC: 95 MG/DL (ref 70–110)
HCT VFR BLD AUTO: 41.2 % (ref 40–54)
HGB BLD-MCNC: 13.4 G/DL (ref 14–18)
IMM GRANULOCYTES # BLD AUTO: 0.02 K/UL (ref 0–0.04)
IMM GRANULOCYTES NFR BLD AUTO: 0.2 % (ref 0–0.5)
LYMPHOCYTES # BLD AUTO: 2.8 K/UL (ref 1–4.8)
LYMPHOCYTES NFR BLD: 34.4 % (ref 18–48)
MCH RBC QN AUTO: 30.2 PG (ref 27–31)
MCHC RBC AUTO-ENTMCNC: 32.5 G/DL (ref 32–36)
MCV RBC AUTO: 93 FL (ref 82–98)
MONOCYTES # BLD AUTO: 1.1 K/UL (ref 0.3–1)
MONOCYTES NFR BLD: 13.2 % (ref 4–15)
NEUTROPHILS # BLD AUTO: 3.7 K/UL (ref 1.8–7.7)
NEUTROPHILS NFR BLD: 45.5 % (ref 38–73)
NRBC BLD-RTO: 0 /100 WBC
PLATELET # BLD AUTO: 352 K/UL (ref 150–450)
PMV BLD AUTO: 9.3 FL (ref 9.2–12.9)
POTASSIUM SERPL-SCNC: 4.5 MMOL/L (ref 3.5–5.1)
PROT SERPL-MCNC: 7.5 G/DL (ref 6–8.4)
RBC # BLD AUTO: 4.43 M/UL (ref 4.6–6.2)
SODIUM SERPL-SCNC: 139 MMOL/L (ref 136–145)
WBC # BLD AUTO: 8.2 K/UL (ref 3.9–12.7)

## 2022-08-31 PROCEDURE — 99213 OFFICE O/P EST LOW 20 MIN: CPT | Mod: PBBFAC | Performed by: INTERNAL MEDICINE

## 2022-08-31 PROCEDURE — 36415 COLL VENOUS BLD VENIPUNCTURE: CPT | Performed by: INTERNAL MEDICINE

## 2022-08-31 PROCEDURE — 80053 COMPREHEN METABOLIC PANEL: CPT | Performed by: INTERNAL MEDICINE

## 2022-08-31 PROCEDURE — 85025 COMPLETE CBC W/AUTO DIFF WBC: CPT | Performed by: INTERNAL MEDICINE

## 2022-08-31 PROCEDURE — 99999 PR PBB SHADOW E&M-EST. PATIENT-LVL III: CPT | Mod: PBBFAC,,, | Performed by: INTERNAL MEDICINE

## 2022-08-31 PROCEDURE — 82378 CARCINOEMBRYONIC ANTIGEN: CPT | Performed by: INTERNAL MEDICINE

## 2022-08-31 PROCEDURE — 99215 OFFICE O/P EST HI 40 MIN: CPT | Mod: S$PBB,,, | Performed by: INTERNAL MEDICINE

## 2022-08-31 PROCEDURE — 99999 PR PBB SHADOW E&M-EST. PATIENT-LVL III: ICD-10-PCS | Mod: PBBFAC,,, | Performed by: INTERNAL MEDICINE

## 2022-08-31 PROCEDURE — 99215 PR OFFICE/OUTPT VISIT, EST, LEVL V, 40-54 MIN: ICD-10-PCS | Mod: S$PBB,,, | Performed by: INTERNAL MEDICINE

## 2022-08-31 RX ORDER — DIPHENHYDRAMINE HYDROCHLORIDE 50 MG/ML
50 INJECTION INTRAMUSCULAR; INTRAVENOUS ONCE AS NEEDED
Status: CANCELLED | OUTPATIENT
Start: 2022-09-01 | End: 2034-01-27

## 2022-08-31 RX ORDER — SODIUM CHLORIDE 0.9 % (FLUSH) 0.9 %
10 SYRINGE (ML) INJECTION
Status: CANCELLED | OUTPATIENT
Start: 2022-09-03

## 2022-08-31 RX ORDER — SODIUM CHLORIDE 0.9 % (FLUSH) 0.9 %
10 SYRINGE (ML) INJECTION
Status: CANCELLED | OUTPATIENT
Start: 2022-09-01

## 2022-08-31 RX ORDER — EPINEPHRINE 0.3 MG/.3ML
0.3 INJECTION SUBCUTANEOUS ONCE AS NEEDED
Status: CANCELLED | OUTPATIENT
Start: 2022-09-01 | End: 2034-01-27

## 2022-08-31 RX ORDER — HEPARIN 100 UNIT/ML
500 SYRINGE INTRAVENOUS
Status: CANCELLED | OUTPATIENT
Start: 2022-09-01

## 2022-08-31 RX ORDER — HEPARIN 100 UNIT/ML
500 SYRINGE INTRAVENOUS
Status: CANCELLED | OUTPATIENT
Start: 2022-09-03

## 2022-08-31 NOTE — PROGRESS NOTES
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 Downs is a 72 y.o. male   70 years old pleasant AA gentleman, with history of hypertension, presenting with two weeks duration of abdominal cramps, diarrhea, nausea and vomiting. His symptoms started gradually with intermittent generalize crampy abdominal pain, worse with food. That was associated with nausea, reflux and occaisonal vomiting. He tried pepto-bismol and imodium with no relief, and hence he presented to ER.   He lost significant amount of weight, but cannot quantify the amount or the time period. He has also been feeling weaker than usual.   He hasn't seen a healthcare provider in over than 10 years. He has never had a colonoscopy.   In the ER. His labs were significant of microcytic anemia of 9.7 g/dl, MCV 77, and Platelets counts of 495. CT of the abdomen and pelvis showed " Large mass in the transverse colon highly suspicious for adenocarcinoma resulting in at least high-grade partial obstruction with dilation of proximal colon and small bowel. Soft tissue nodules in the adjacent mesentery are suspicious for pily metastases and/or mesenteric implants.  Numerous hepatic masses measuring up to 11.2 cm most likely related to metastatic disease.  There also several " "small subcapsular lesions which could reflect additional metastatic disease"   CT chest was negative to metastatic disease.   He underwent colonoscopy and stent placement. 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 HER 2 negative, VIRI/SHERI NGS was cancelled due to insufficient quantity   Guardant 360 was sent, negative for KRAS, NRAS, BRAF     He started palliative chemotherapy with FOLFOX+Pema     Restaging scans on 07/22/2021 showed significant decrease in size trasverse colon mass as well liver metastatic lesion.   He was switched to maintenance 5FU+Pema from C8    Restaging scans from 10/05/2021 (after C11) showing stable-mildly improved liver mets.   Restaging scans from 12/28/2021 (after C17) showing stable disease.   Restaging scans from 03/22/22 show stable disease.     MRI on 7/18/22    Impression:     Slight increase in size of the large hepatic metastasis when accounting for differences in imaging technique.  No new disease.     Remaining 2 liver lesions remain stable in size.  The larger lesion in the left hepatic lobe has features typical of a hemangioma.  Lesion at the hepatic dome is difficult to fully characterize due to location and breathing motion artifact, though may represent a hemangioma as well.  This can be followed.     Additional stable chronic findings as above.    Interval History:   He presents today with his daughter prior to cycle 35 of 5FU and Avastin. He feels well without specific complaints.  No change in bowel movements, appetite or any new concerns. Denies pain.      Presents with his daughter today. ECOG status is 1.       Past Medical History:   Past Medical History:   Diagnosis Date    MARIA A (acute kidney injury) 8/18/2022    Hypertension     Metastatic colon cancer to liver 4/22/2021       Past Surgical HIstory:   Past Surgical " History:   Procedure Laterality Date    COLONOSCOPY N/A 4/20/2021    Procedure: COLONOSCOPY with possible stent;  Surgeon: EDILMA Bonilla MD;  Location: [facility] ENDO (2ND FLR);  Service: Colon and Rectal;  Laterality: N/A;    INSERTION OF TUNNELED CENTRAL VENOUS CATHETER (CVC) WITH SUBCUTANEOUS PORT N/A 5/3/2021    Procedure: [id number];  Surgeon: Francisco Layton MD;  Location: [facility] OR 2ND FLR;  Service: Vascular;  Laterality: N/A;       Family History: No family history on file.    Social History:  reports that he has never smoked. He has never used smokeless tobacco. He reports current alcohol use.    Allergies:  Review of patient's allergies indicates:  No Known Allergies    Medications:  Current Outpatient Medications   Medication Sig Dispense Refill    acetaminophen (TYLENOL) 500 MG tablet Take 1 tablet (500 mg total) by mouth every 6 (six) hours as needed for Pain (alternate with ibuprofen).  0    amLODIPine (NORVASC) 10 MG tablet Take 1 tablet (10 mg total) by mouth once daily. 90 tablet 3    ibuprofen (ADVIL,MOTRIN) 400 MG tablet Take 1 tablet (400 mg total) by mouth every 6 (six) hours as needed for Other (pain). Alternate with tylenol      LIDOcaine-prilocaine (EMLA) cream Apply topically as needed (Apply one hour before treatment). 30 g 5    ondansetron (ZOFRAN) 4 MG tablet Take 1 tablet (4 mg total) by mouth every 8 (eight) hours as needed for Nausea. 30 tablet 3     Current Facility-Administered Medications   Medication Dose Route Frequency Provider Last Rate Last Admin    sars-cov-2 (covid-19) 30 mcg/0.3 ml injection 0.3 mL  0.3 mL Intramuscular 1 time in Clinic/HOD Idania Rock LPN           Review of Systems   Constitutional:  Negative for appetite change, fatigue, fever and unexpected weight change.   HENT:  Negative for congestion and nosebleeds.    Eyes:  Negative for pain and visual disturbance.   Respiratory:  Negative for cough, chest tightness, shortness of breath and wheezing.   "  Cardiovascular:  Negative for chest pain, palpitations and leg swelling.   Gastrointestinal:  Negative for abdominal pain, blood in stool, constipation, diarrhea, nausea and vomiting.   Genitourinary:  Negative for difficulty urinating, flank pain, frequency, hematuria and urgency.   Musculoskeletal:  Negative for arthralgias, back pain and myalgias.   Neurological:  Negative for dizziness, weakness, numbness and headaches.   Psychiatric/Behavioral:  The patient is not nervous/anxious.      ECOG Performance Status: 1   Objective:      Vitals:   Vitals:    08/31/22 1342   BP: 129/78   BP Location: Left arm   Patient Position: Sitting   BP Method: Small (Automatic)   Pulse: 62   Resp: 18   Temp: 98.1 °F (36.7 °C)   TempSrc: Other (see comments)   SpO2: 99%   Weight: 56.3 kg (124 lb 0.1 oz)   Height: 5' 7" (1.702 m)     Physical Exam  Constitutional:       General: He is not in acute distress.     Appearance: Normal appearance.   HENT:      Head: Normocephalic and atraumatic.   Eyes:      Pupils: Pupils are equal, round, and reactive to light.   Cardiovascular:      Rate and Rhythm: Normal rate and regular rhythm.      Pulses: Normal pulses.      Heart sounds: Normal heart sounds. No murmur heard.  Pulmonary:      Effort: Pulmonary effort is normal. No respiratory distress.      Breath sounds: Normal breath sounds. No wheezing.   Abdominal:      General: Abdomen is flat. Bowel sounds are normal. There is no distension.      Palpations: Abdomen is soft. There is no mass.      Tenderness: There is no abdominal tenderness.   Musculoskeletal:         General: No swelling or deformity. Normal range of motion.   Skin:     Coloration: Skin is not jaundiced.   Neurological:      General: No focal deficit present.      Mental Status: He is alert and oriented to person, place, and time. Mental status is at baseline.   Psychiatric:         Mood and Affect: Mood normal.       Laboratory Data:  Lab Visit on 08/31/2022   Component " Date Value Ref Range Status    WBC 08/31/2022 8.20  3.90 - 12.70 K/uL Final    RBC 08/31/2022 4.43 (L)  4.60 - 6.20 M/uL Final    Hemoglobin 08/31/2022 13.4 (L)  14.0 - 18.0 g/dL Final    Hematocrit 08/31/2022 41.2  40.0 - 54.0 % Final    MCV 08/31/2022 93  82 - 98 fL Final    MCH 08/31/2022 30.2  27.0 - 31.0 pg Final    MCHC 08/31/2022 32.5  32.0 - 36.0 g/dL Final    RDW 08/31/2022 14.8 (H)  11.5 - 14.5 % Final    Platelets 08/31/2022 352  150 - 450 K/uL Final    MPV 08/31/2022 9.3  9.2 - 12.9 fL Final    Immature Granulocytes 08/31/2022 0.2  0.0 - 0.5 % Final    Gran # (ANC) 08/31/2022 3.7  1.8 - 7.7 K/uL Final    Immature Grans (Abs) 08/31/2022 0.02  0.00 - 0.04 K/uL Final    Comment: Mild elevation in immature granulocytes is non specific and   can be seen in a variety of conditions including stress response,   acute inflammation, trauma and pregnancy. Correlation with other   laboratory and clinical findings is essential.      Lymph # 08/31/2022 2.8  1.0 - 4.8 K/uL Final    Mono # 08/31/2022 1.1 (H)  0.3 - 1.0 K/uL Final    Eos # 08/31/2022 0.5  0.0 - 0.5 K/uL Final    Baso # 08/31/2022 0.05  0.00 - 0.20 K/uL Final    nRBC 08/31/2022 0  0 /100 WBC Final    Gran % 08/31/2022 45.5  38.0 - 73.0 % Final    Lymph % 08/31/2022 34.4  18.0 - 48.0 % Final    Mono % 08/31/2022 13.2  4.0 - 15.0 % Final    Eosinophil % 08/31/2022 6.1  0.0 - 8.0 % Final    Basophil % 08/31/2022 0.6  0.0 - 1.9 % Final    Differential Method 08/31/2022 Automated   Final    Sodium 08/31/2022 139  136 - 145 mmol/L Final    Potassium 08/31/2022 4.5  3.5 - 5.1 mmol/L Final    Chloride 08/31/2022 106  95 - 110 mmol/L Final    CO2 08/31/2022 26  23 - 29 mmol/L Final    Glucose 08/31/2022 95  70 - 110 mg/dL Final    BUN 08/31/2022 14  8 - 23 mg/dL Final    Creatinine 08/31/2022 1.2  0.5 - 1.4 mg/dL Final    Calcium 08/31/2022 9.2  8.7 - 10.5 mg/dL Final    Total Protein 08/31/2022 7.5  6.0 - 8.4 g/dL Final    Albumin 08/31/2022 3.1 (L)  3.5 - 5.2  g/dL Final    Total Bilirubin 08/31/2022 0.5  0.1 - 1.0 mg/dL Final    Comment: For infants and newborns, interpretation of results should be based  on gestational age, weight and in agreement with clinical  observations.    Premature Infant recommended reference ranges:  Up to 24 hours.............<8.0 mg/dL  Up to 48 hours............<12.0 mg/dL  3-5 days..................<15.0 mg/dL  6-29 days.................<15.0 mg/dL      Alkaline Phosphatase 08/31/2022 83  55 - 135 U/L Final    AST 08/31/2022 22  10 - 40 U/L Final    ALT 08/31/2022 10  10 - 44 U/L Final    Anion Gap 08/31/2022 7 (L)  8 - 16 mmol/L Final    eGFR 08/31/2022 >60.0  >60 mL/min/1.73 m^2 Final    CEA 08/31/2022 11.4 (H)  0.0 - 5.0 ng/mL Final    Comment: CEA Normal Range:  Non-Smokers: 0-3.0 ng/mL  Smokers:     0-5.0 ng/mL    The testing method is a chemiluminescent microparticle immunoassay   manufactured by [employer] and performed on the    or   Freep[employer] system. Values obtained with different assay manufacturers   for   methods may be different and cannot be used interchangeably.     CEA has increased to 11.4 from 12.7.      Assessment and Plan        1. 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 Primary hypertension    4. MARIA A (acute kidney injury)    5. Proteinuria, unspecified type    6. Anemia associated with chemotherapy        1,2.  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  Guardant 360 was negative for BRAF, VIRI, HER2 amplification.   Pretreatment CEA 57.  We had a long and sonia discussion with him about his diagnosis. Unfortunately, the disease is not curable but remains treatable and he has good performance status.   Restaging scans after 7 cycles showed significant reduction in colonic mass and liver mass.   we switched to maintenance as of cycle 8 with 5FU + Pema  Restaging scans from March 2022 show stable disease.   MRI on 7/18/22  showing hepatic progression of disease in the right hepatic lobe noted on the exam. CT CAP on 8/26 shows some mild progression in both liver metastases.    Discussed case at colorectal tumor board 8/31/22.  Plan for diagnostic lap with Dr. Rodriguez on 9/7 to assess for any occult peritoneal disease.  If not, will consider liver metastasectomy ?after Y-90 and resection of primary tumor with Dr. Bonilla.  Will need to re-present at [facility] tumor board after diagnostic lap.    Meanwhile given mild progression on scans, will add back oxaliplatin and hold Avastin jaya-procedurally.  Proceed with FOLFOX tomorrow.    3. Hypertension   --Well Controlled   --continue with Amlodipine .     4. MARIA A, proteinuria  --Improved renal function.  Holding Avastin this cycle due to upcoming procedure.  -- Monitor U/A.    5. Anemia  -- Mild. Monitor.    Bunny Schuster MD  Hematology/Oncology  [facility]      Route Chart for Scheduling    Med Onc Chart Routing      Follow up with physician 4 weeks. for FOLFOX   Follow up with RACHEL 2 weeks. for FOLFOX   Infusion scheduling note    Injection scheduling note    Labs CBC, CMP, CEA and urinalysis   Lab interval: every 2 weeks     Imaging    Pharmacy appointment    Other referrals        Treatment Plan Information   OP COLORECTAL FOLFOX + BEVACIZUMAB Q2W   Torres Pantoja MD   Upcoming Treatment Dates - OP COLORECTAL FOLFOX + BEVACIZUMAB Q2W    9/1/2022       Chemotherapy       oxaliplatin (ELOXATIN) 85 mg/m2 = 139 mg in dextrose 5 % 500 mL chemo infusion       fluorouraciL 2,400 mg/m2 = 3,910 mg in sodium chloride 0.9% 100 mL chemo infusion       Antiemetics       palonosetron (ALOXI) 0.25 mg with Dexamethasone (DECADRON) 12 mg in NS 50 mL IVPB  9/15/2022       Chemotherapy       bevacizumab (AVASTIN) in sodium chloride 0.9% 100 mL chemo infusion       oxaliplatin (ELOXATIN) in dextrose 5 % 500 mL chemo infusion       fluorouraciL in sodium chloride 0.9% 100  mL chemo infusion       Antiemetics       palonosetron (ALOXI) 0.25 mg with Dexamethasone (DECADRON) 12 mg in NS 50 mL IVPB  9/29/2022       Chemotherapy       oxaliplatin (ELOXATIN) in dextrose 5 % 500 mL chemo infusion       fluorouraciL in sodium chloride 0.9% 100 mL chemo infusion       Antiemetics       palonosetron (ALOXI) 0.25 mg with Dexamethasone (DECADRON) 12 mg in NS 50 mL IVPB

## 2022-08-31 NOTE — TELEPHONE ENCOUNTER
----- Message from Adrian Rodriguez MD sent at 8/31/2022  8:00 AM CDT -----  Esther/Crissy, we discussed thsi patient today. He needs a diagnostic laparoscopy.     Esther, can you set look for a date for him next week? Thanks    Adrian

## 2022-08-31 NOTE — PROGRESS NOTES
Spoke to patient and daughter. Confirmed procedure for 9/7/2022.   Informed to arrive at the [facility] on the 2nd floor on [facility] for 1030 and surgery time is 1230. RN will call  day before to confirm surgery and arrival time. Surgery Instructions provided as follows:  instructed to remain NPO solids 8 hours prior to surgery, clear liquids until 2 hours prior to surgery, to shower with hibiclens the night before surgery and morning of surgery before arrival, not to apply any lotions, powders or deodorant, remove all metal and jewelry, to wear comfortable clothes, and leave all valuables at home. Medications reviewed and  instructed to bring medications on DOS. Confirmed pt  will have transportation home and daughter will accompany pt on DOS. Post operative instructions reviewed. Pt provided [facility] surgery guide,  disability instructions,  and  instructed to bring surgery folder  on DOS. Pt  and daughter verbalized understanding to all of the above and instructed to contact us for any questions.

## 2022-09-01 ENCOUNTER — INFUSION (OUTPATIENT)
Dept: INFUSION THERAPY | Facility: HOSPITAL | Age: 72
End: 2022-09-01
Attending: INTERNAL MEDICINE
Payer: MEDICARE

## 2022-09-01 VITALS
WEIGHT: 124 LBS | HEART RATE: 63 BPM | TEMPERATURE: 98 F | BODY MASS INDEX: 19.42 KG/M2 | RESPIRATION RATE: 16 BRPM | OXYGEN SATURATION: 100 % | DIASTOLIC BLOOD PRESSURE: 64 MMHG | SYSTOLIC BLOOD PRESSURE: 113 MMHG

## 2022-09-01 DIAGNOSIS — C18.9 METASTATIC COLON CANCER TO LIVER: Primary | ICD-10-CM

## 2022-09-01 DIAGNOSIS — C78.7 METASTATIC COLON CANCER TO LIVER: Primary | ICD-10-CM

## 2022-09-01 PROCEDURE — 96415 CHEMO IV INFUSION ADDL HR: CPT

## 2022-09-01 PROCEDURE — 96367 TX/PROPH/DG ADDL SEQ IV INF: CPT

## 2022-09-01 PROCEDURE — 63600175 PHARM REV CODE 636 W HCPCS: Performed by: INTERNAL MEDICINE

## 2022-09-01 PROCEDURE — 25000003 PHARM REV CODE 250: Performed by: INTERNAL MEDICINE

## 2022-09-01 PROCEDURE — 96416 CHEMO PROLONG INFUSE W/PUMP: CPT

## 2022-09-01 PROCEDURE — 96413 CHEMO IV INFUSION 1 HR: CPT

## 2022-09-01 RX ORDER — EPINEPHRINE 0.3 MG/.3ML
0.3 INJECTION SUBCUTANEOUS ONCE AS NEEDED
Status: DISCONTINUED | OUTPATIENT
Start: 2022-09-01 | End: 2022-09-01 | Stop reason: HOSPADM

## 2022-09-01 RX ORDER — DIPHENHYDRAMINE HYDROCHLORIDE 50 MG/ML
50 INJECTION INTRAMUSCULAR; INTRAVENOUS ONCE AS NEEDED
Status: DISCONTINUED | OUTPATIENT
Start: 2022-09-01 | End: 2022-09-01 | Stop reason: HOSPADM

## 2022-09-01 RX ADMIN — DEXAMETHASONE SODIUM PHOSPHATE 0.25 MG: 4 INJECTION, SOLUTION INTRA-ARTICULAR; INTRALESIONAL; INTRAMUSCULAR; INTRAVENOUS; SOFT TISSUE at 10:09

## 2022-09-01 RX ADMIN — DEXTROSE: 50 INJECTION, SOLUTION INTRAVENOUS at 10:09

## 2022-09-01 RX ADMIN — FLUOROURACIL 3910 MG: 50 INJECTION, SOLUTION INTRAVENOUS at 01:09

## 2022-09-01 RX ADMIN — OXALIPLATIN 139 MG: 5 INJECTION, SOLUTION INTRAVENOUS at 11:09

## 2022-09-01 NOTE — PLAN OF CARE
Pt here for Folfox.  Assessment complete and labs reviewed. VSS. PAC accessed using sterile technique; biopatch in place. Pt tolerated infusion well, no reaction suspected. Placed pt on 5 FU CADD pump; pump infusing without difficulty prior to d/c. No questions or concerns. Pt to RTC 9/3/22 for pump d/c. Pt ambulated out of unit unassisted.

## 2022-09-02 ENCOUNTER — DOCUMENTATION ONLY (OUTPATIENT)
Dept: HEMATOLOGY/ONCOLOGY | Facility: CLINIC | Age: 72
End: 2022-09-02
Payer: MEDICARE

## 2022-09-02 NOTE — PROGRESS NOTES
[facility]     Colorectal Liver Metastasis Tumor Board Note      Date: 9/1/2022    Case Overview: Mr. Downs (72M) was diagnosed with adenocarcinoma of the transverse colon with synchronous liver metastases in April 2021. He received FOLFOX + bevacizumab from 5/2021-7/2021 and has since been on maintenance 5-FU + bevacizumab. He was initially presented to the [facility] tumor board on 8/4/2022 with recommendations to be presented at [facility] tumor board, f/u with [facility] for primary resection, and f/u with IR/transplant for hepatic resection.     Participants: medical oncology, surgical oncology, interventional radiology, colorectal surgery, transplant surgery, oncology navigation     Imaging Reviewed: MRI 7/18/2022    Radiology Review: 7.6cm mass in seg VII, metastatic lesion.  1.2cm intdeterminate lesion in seg VIII.  2.9cm seg II lesion that demonstrates imaging characteristics most suggestive of hemangioma.  Left hepatic lobe remnant appears small.  Right hepatic lobe tumor appeared to respond to chemotherapy initially, now slightly enlarging in size.  Could consider PVE vs radiation lobectomy.  Radiation lobectomy may be favored as treatment/bridge to possible future resection while evaluating for peritoneal disease.    Orders/Referrals: none at this time.     Board Recommendations: The patient has met with [facility]. The recommendation is to proceed with diagnostic lap to rule out carcinomatosis. He should also have oxaliplatin added to his chemotherapy regimen. If his diagnostic lap is negative, IR can further evaluate locoregional therapies to the liver, such as Y90. We plan to represent his case in 3 months pending diagnostic lap results.

## 2022-09-03 ENCOUNTER — INFUSION (OUTPATIENT)
Dept: INFUSION THERAPY | Facility: HOSPITAL | Age: 72
End: 2022-09-03
Attending: INTERNAL MEDICINE
Payer: MEDICARE

## 2022-09-03 VITALS
TEMPERATURE: 98 F | SYSTOLIC BLOOD PRESSURE: 113 MMHG | HEIGHT: 67 IN | BODY MASS INDEX: 19.48 KG/M2 | WEIGHT: 124.13 LBS | DIASTOLIC BLOOD PRESSURE: 71 MMHG | HEART RATE: 61 BPM

## 2022-09-03 DIAGNOSIS — C78.7 METASTATIC COLON CANCER TO LIVER: Primary | ICD-10-CM

## 2022-09-03 DIAGNOSIS — C18.9 METASTATIC COLON CANCER TO LIVER: Primary | ICD-10-CM

## 2022-09-03 PROCEDURE — 25000003 PHARM REV CODE 250: Performed by: INTERNAL MEDICINE

## 2022-09-03 PROCEDURE — 63600175 PHARM REV CODE 636 W HCPCS: Performed by: INTERNAL MEDICINE

## 2022-09-03 PROCEDURE — A4216 STERILE WATER/SALINE, 10 ML: HCPCS | Performed by: INTERNAL MEDICINE

## 2022-09-03 RX ORDER — HEPARIN 100 UNIT/ML
500 SYRINGE INTRAVENOUS
Status: DISCONTINUED | OUTPATIENT
Start: 2022-09-03 | End: 2022-09-03 | Stop reason: HOSPADM

## 2022-09-03 RX ORDER — SODIUM CHLORIDE 0.9 % (FLUSH) 0.9 %
10 SYRINGE (ML) INJECTION
Status: DISCONTINUED | OUTPATIENT
Start: 2022-09-03 | End: 2022-09-03 | Stop reason: HOSPADM

## 2022-09-03 RX ADMIN — Medication 10 ML: at 11:09

## 2022-09-03 RX ADMIN — HEPARIN SODIUM (PORCINE) LOCK FLUSH IV SOLN 100 UNIT/ML 500 UNITS: 100 SOLUTION at 11:09

## 2022-09-03 NOTE — NURSING
1150  Patient seated in chair, VSS, Assessment done. CADD infusion completed, tolerated well.  CADD disconnected from port, flushed, heparin locked. Patient ambulated off floor escorted by wife.

## 2022-09-06 ENCOUNTER — ANESTHESIA EVENT (OUTPATIENT)
Dept: SURGERY | Facility: HOSPITAL | Age: 72
End: 2022-09-06
Payer: MEDICARE

## 2022-09-06 ENCOUNTER — TELEPHONE (OUTPATIENT)
Dept: SURGERY | Facility: CLINIC | Age: 72
End: 2022-09-06
Payer: MEDICARE

## 2022-09-06 NOTE — ANESTHESIA PREPROCEDURE EVALUATION
[facility]  Anesthesia Pre-Operative Evaluation        Patient Name: Javier Downs  YOB: 1950  MRN: [id number]    SUBJECTIVE:     Pre-operative Evaluation for Procedure(s) (LRB):  LAPAROSCOPY, DIAGNOSTIC (N/A)     09/06/2022    Javier Downs is a 72 y.o. male with a PMHx significant for anemia, HTN, and adenocarcinoma of the transverse colon with liver metastases.     He now presents for the above procedure.    LDA:   RIJ Port A Cath     Previous Airway (4/20/21):     Induction:  Rapid sequence induction    Intubated:  Postinduction    Mask Ventilation:  Moderately difficult with oral airway    Attempts:  1    Attempted By:  CRNA    Method of Intubation:  Direct    Blade:  Live 2    Laryngeal View Grade: Grade I - full view of chords      Difficult Airway Encountered?: No      Complications:  None    Airway Device:  Oral endotracheal tube    Airway Device Size:  7.5    Style/Cuff Inflation:  Cuffed    Secured at:  The lips    Placement Verified By:  Capnometry    Complicating Factors:  None    Findings Post-Intubation:  BS equal bilateral and atraumatic/condition of teeth unchanged      Patient Active Problem List   Diagnosis    Small bowel obstruction, partial    Colonic mass    Liver masses    Hypertension    Microcytic anemia    Thrombocytosis    Mesenteric vein thrombosis    Metastatic colon cancer to liver    Colon cancer metastasized to liver    MARIA A (acute kidney injury)       Review of patient's allergies indicates:  No Known Allergies    Current Outpatient Medications   Medication Instructions    acetaminophen (TYLENOL) 500 mg, Oral, Every 6 hours PRN    amLODIPine (NORVASC) 10 mg, Oral, Daily    ibuprofen (ADVIL,MOTRIN) 400 mg, Oral, Every 6 hours PRN, Alternate with tylenol    LIDOcaine-prilocaine (EMLA) cream Topical (Top), As needed (PRN)    ondansetron (ZOFRAN) 4 mg, Oral, Every 8 hours PRN       Past Surgical History:   Procedure Laterality Date     COLONOSCOPY N/A 4/20/2021    Procedure: COLONOSCOPY with possible stent;  Surgeon: EDILMA Bonilla MD;  Location: Psychiatric (2ND FLR);  Service: Colon and Rectal;  Laterality: N/A;    INSERTION OF TUNNELED CENTRAL VENOUS CATHETER (CVC) WITH SUBCUTANEOUS PORT N/A 5/3/2021    Procedure: [id number];  Surgeon: Francisco Layton MD;  Location: [facility] OR 2ND FLR;  Service: Vascular;  Laterality: N/A;       Social History     Substance and Sexual Activity   Drug Use Not on file     Alcohol Use: Not on file     Tobacco Use: Low Risk     Smoking Tobacco Use: Never    Smokeless Tobacco Use: Never       OBJECTIVE:     Vital Signs Range (Last 24H):         Significant Labs    Heme Profile  Lab Results   Component Value Date    WBC 8.20 08/31/2022    HGB 13.4 (L) 08/31/2022    HCT 41.2 08/31/2022     08/31/2022       Coagulation Studies  Lab Results   Component Value Date    LABPROT 30.3 (H) 07/01/2021    INR 3.3 07/07/2021    APTT 28.8 04/22/2021       BMP  Lab Results   Component Value Date     08/31/2022    K 4.5 08/31/2022     08/31/2022    CO2 26 08/31/2022    BUN 14 08/31/2022    CREATININE 1.2 08/31/2022    MG 1.8 04/22/2021    PHOS 2.6 (L) 04/22/2021       Liver Function Tests  Lab Results   Component Value Date    AST 22 08/31/2022    ALT 10 08/31/2022    ALKPHOS 83 08/31/2022    BILITOT 0.5 08/31/2022    PROT 7.5 08/31/2022    ALBUMIN 3.1 (L) 08/31/2022       Lipid Profile  Lab Results   Component Value Date    CHOL 144 04/18/2021    HDL 34 (L) 04/18/2021    TRIG 62 04/18/2021       Endocrine Profile  No results found for: HGBA1C, TSH      Cardiac Studies    EKG:   Results for orders placed or performed during the hospital encounter of 04/17/21   EKG 12-lead    Collection Time: 04/17/21 10:13 AM    Narrative    Test Reason : R10.9,    Vent. Rate : 078 BPM     Atrial Rate : 078 BPM     P-R Int : 114 ms          QRS Dur : 078 ms      QT Int : 398 ms       P-R-T Axes : 067 -77 028 degrees      QTc Int : 453 ms    Normal sinus rhythm  Nonspecific ST and T wave abnormality  Abnormal ECG  No previous ECGs available  Confirmed by Erika Garcia MD ([id number]) on 4/18/2021 10:55:14 AM    Referred By: KURT   SELF           Confirmed By:Erika Garcia MD       TTE:  No results found for this or any previous visit.      ASSESSMENT/PLAN:      09/06/2022  Javier Downs is a 72 y.o., male.      Pre-op Assessment    I have reviewed the Patient Summary Reports.     I have reviewed the Nursing Notes.    I have reviewed the Medications.     Review of Systems  Anesthesia Hx:  No problems with previous Anesthesia    Hematology/Oncology:         -- Anemia: Current/Recent Cancer. Other (see Oncology comments) chemotherapy and radiation Oncology Comments: adenocarcinoma of the transverse colon with synchronous liver metastases      EENT/Dental:EENT/Dental Normal   Cardiovascular:   Hypertension    Pulmonary:  Pulmonary Normal    Hepatic/GI:   Liver Disease,    Neurological:  Neurology Normal    Endocrine:  Endocrine Normal    Dermatological:  Skin Normal    Psych:  Psychiatric Normal           Physical Exam  General: Alert    Airway:  Mallampati: II   Mouth Opening: Normal  TM Distance: Normal  Tongue: Normal  Neck ROM: Normal ROM    Dental:  Intact        Anesthesia Plan  Type of Anesthesia, risks & benefits discussed:    Anesthesia Type: Gen ETT  Intra-op Monitoring Plan: Standard ASA Monitors, Art Line and Central Line  Post Op Pain Control Plan: multimodal analgesia and IV/PO Opioids PRN  Induction:  IV  Airway Plan: Direct, Post-Induction  Informed Consent: Informed consent signed with the Patient and all parties understand the risks and agree with anesthesia plan.  All questions answered. Patient consented to blood products? Yes  ASA Score: 2  Day of Surgery Review of History & Physical: H&P Update referred to the surgeon/provider.    Ready For Surgery From Anesthesia Perspective.     .

## 2022-09-06 NOTE — TELEPHONE ENCOUNTER
Spoke to patient's daughter.  Confirmed procedure for 9/7/2022.   Informed to arrive at [facility] on the 2nd floor on [facility] for 1030  and surgery time is 1230.  Surgery Instructions provided as follows:  instructed to remain NPO solids 8 hours prior to surgery, clear liquids until 2 hours prior to surgery, to shower with hibiclens the night before surgery and morning of surgery before arrival, not to apply any lotions, powders or deodorant, remove all metal and jewelry, to wear comfortable clothes, and leave all valuables at home. Medications reviewed and  instructed to bring medications on DOS. Confirmed pt  will have transportation home and daughter Carrie will accompany pt on DOS. Post operative instructions reviewed. Carrie verbalized understanding to all of the above and instructed to contact us for any questions.

## 2022-09-07 ENCOUNTER — ANESTHESIA (OUTPATIENT)
Dept: SURGERY | Facility: HOSPITAL | Age: 72
End: 2022-09-07
Payer: MEDICARE

## 2022-09-07 ENCOUNTER — HOSPITAL ENCOUNTER (OUTPATIENT)
Facility: HOSPITAL | Age: 72
Discharge: HOME | End: 2022-09-07
Attending: SURGERY | Admitting: SURGERY
Payer: MEDICARE

## 2022-09-07 ENCOUNTER — OFFICE VISIT (OUTPATIENT)
Dept: SURGERY | Facility: CLINIC | Age: 72
End: 2022-09-07
Payer: MEDICARE

## 2022-09-07 VITALS
SYSTOLIC BLOOD PRESSURE: 132 MMHG | HEART RATE: 77 BPM | HEIGHT: 67 IN | WEIGHT: 124 LBS | BODY MASS INDEX: 19.46 KG/M2 | DIASTOLIC BLOOD PRESSURE: 84 MMHG

## 2022-09-07 VITALS
HEART RATE: 55 BPM | TEMPERATURE: 98 F | BODY MASS INDEX: 19.46 KG/M2 | RESPIRATION RATE: 20 BRPM | SYSTOLIC BLOOD PRESSURE: 136 MMHG | HEIGHT: 67 IN | OXYGEN SATURATION: 100 % | DIASTOLIC BLOOD PRESSURE: 80 MMHG | WEIGHT: 124 LBS

## 2022-09-07 DIAGNOSIS — C18.4 MALIGNANT NEOPLASM OF TRANSVERSE COLON: Primary | ICD-10-CM

## 2022-09-07 DIAGNOSIS — C18.9 COLON CANCER METASTASIZED TO LIVER: Primary | ICD-10-CM

## 2022-09-07 DIAGNOSIS — C18.9 METASTATIC COLON CANCER TO LIVER: ICD-10-CM

## 2022-09-07 DIAGNOSIS — C78.7 COLON CANCER METASTASIZED TO LIVER: Primary | ICD-10-CM

## 2022-09-07 DIAGNOSIS — C78.7 METASTATIC COLON CANCER TO LIVER: ICD-10-CM

## 2022-09-07 PROCEDURE — 25000003 PHARM REV CODE 250

## 2022-09-07 PROCEDURE — 99213 PR OFFICE/OUTPT VISIT, EST, LEVL III, 20-29 MIN: ICD-10-PCS | Mod: S$PBB,,, | Performed by: COLON & RECTAL SURGERY

## 2022-09-07 PROCEDURE — 71000044 HC DOSC ROUTINE RECOVERY FIRST HOUR: Performed by: SURGERY

## 2022-09-07 PROCEDURE — 99213 OFFICE O/P EST LOW 20 MIN: CPT | Mod: PBBFAC,25 | Performed by: COLON & RECTAL SURGERY

## 2022-09-07 PROCEDURE — 99213 OFFICE O/P EST LOW 20 MIN: CPT | Mod: S$PBB,,, | Performed by: COLON & RECTAL SURGERY

## 2022-09-07 PROCEDURE — 88342 CHG IMMUNOCYTOCHEMISTRY: ICD-10-PCS | Mod: 26,,, | Performed by: PATHOLOGY

## 2022-09-07 PROCEDURE — 88342 IMHCHEM/IMCYTCHM 1ST ANTB: CPT | Mod: 26,,, | Performed by: PATHOLOGY

## 2022-09-07 PROCEDURE — 88305 TISSUE EXAM BY PATHOLOGIST: CPT | Mod: 26,,, | Performed by: PATHOLOGY

## 2022-09-07 PROCEDURE — 99999 PR PBB SHADOW E&M-EST. PATIENT-LVL III: ICD-10-PCS | Mod: PBBFAC,,, | Performed by: COLON & RECTAL SURGERY

## 2022-09-07 PROCEDURE — 88305 TISSUE EXAM BY PATHOLOGIST: ICD-10-PCS | Mod: 26,,, | Performed by: PATHOLOGY

## 2022-09-07 PROCEDURE — 88341 PR IHC OR ICC EACH ADD'L SINGLE ANTIBODY  STAINPR: ICD-10-PCS | Mod: 26,,, | Performed by: PATHOLOGY

## 2022-09-07 PROCEDURE — 63600175 PHARM REV CODE 636 W HCPCS

## 2022-09-07 PROCEDURE — 88112 CYTOPATH CELL ENHANCE TECH: CPT | Mod: 26,,, | Performed by: PATHOLOGY

## 2022-09-07 PROCEDURE — 88341 IMHCHEM/IMCYTCHM EA ADD ANTB: CPT | Performed by: PATHOLOGY

## 2022-09-07 PROCEDURE — 36000709 HC OR TIME LEV III EA ADD 15 MIN: Performed by: SURGERY

## 2022-09-07 PROCEDURE — 99999 PR PBB SHADOW E&M-EST. PATIENT-LVL III: CPT | Mod: PBBFAC,,, | Performed by: COLON & RECTAL SURGERY

## 2022-09-07 PROCEDURE — 88341 IMHCHEM/IMCYTCHM EA ADD ANTB: CPT | Mod: 26,,, | Performed by: PATHOLOGY

## 2022-09-07 PROCEDURE — 71000016 HC POSTOP RECOV ADDL HR: Performed by: SURGERY

## 2022-09-07 PROCEDURE — D9220A PRA ANESTHESIA: ICD-10-PCS | Mod: ,,, | Performed by: ANESTHESIOLOGY

## 2022-09-07 PROCEDURE — 36000708 HC OR TIME LEV III 1ST 15 MIN: Performed by: SURGERY

## 2022-09-07 PROCEDURE — 88112 PR  CYTOPATH, CELL ENHANCE TECH: ICD-10-PCS | Mod: 26,,, | Performed by: PATHOLOGY

## 2022-09-07 PROCEDURE — 88112 CYTOPATH CELL ENHANCE TECH: CPT | Performed by: PATHOLOGY

## 2022-09-07 PROCEDURE — 37000008 HC ANESTHESIA 1ST 15 MINUTES: Performed by: SURGERY

## 2022-09-07 PROCEDURE — 71000015 HC POSTOP RECOV 1ST HR: Performed by: SURGERY

## 2022-09-07 PROCEDURE — 27201423 OPTIME MED/SURG SUP & DEVICES STERILE SUPPLY: Performed by: SURGERY

## 2022-09-07 PROCEDURE — 37000009 HC ANESTHESIA EA ADD 15 MINS: Performed by: SURGERY

## 2022-09-07 PROCEDURE — 25000003 PHARM REV CODE 250: Performed by: SURGERY

## 2022-09-07 PROCEDURE — 88342 IMHCHEM/IMCYTCHM 1ST ANTB: CPT | Performed by: PATHOLOGY

## 2022-09-07 PROCEDURE — D9220A PRA ANESTHESIA: Mod: ,,, | Performed by: ANESTHESIOLOGY

## 2022-09-07 PROCEDURE — 88305 TISSUE EXAM BY PATHOLOGIST: CPT | Performed by: PATHOLOGY

## 2022-09-07 PROCEDURE — 49320 DIAG LAPARO SEPARATE PROC: CPT | Mod: ,,, | Performed by: SURGERY

## 2022-09-07 PROCEDURE — 49320 PR LAP,DIAGNOSTIC ABDOMEN: ICD-10-PCS | Mod: ,,, | Performed by: SURGERY

## 2022-09-07 RX ORDER — PROPOFOL 10 MG/ML
VIAL (ML) INTRAVENOUS
Status: DISCONTINUED | OUTPATIENT
Start: 2022-09-07 | End: 2022-09-07

## 2022-09-07 RX ORDER — ROCURONIUM BROMIDE 10 MG/ML
INJECTION, SOLUTION INTRAVENOUS
Status: DISCONTINUED | OUTPATIENT
Start: 2022-09-07 | End: 2022-09-07

## 2022-09-07 RX ORDER — PROCHLORPERAZINE EDISYLATE 5 MG/ML
5 INJECTION INTRAMUSCULAR; INTRAVENOUS EVERY 30 MIN PRN
Status: DISCONTINUED | OUTPATIENT
Start: 2022-09-07 | End: 2022-09-07 | Stop reason: HOSPADM

## 2022-09-07 RX ORDER — BUPIVACAINE HYDROCHLORIDE 2.5 MG/ML
INJECTION, SOLUTION EPIDURAL; INFILTRATION; INTRACAUDAL
Status: DISCONTINUED | OUTPATIENT
Start: 2022-09-07 | End: 2022-09-07 | Stop reason: HOSPADM

## 2022-09-07 RX ORDER — DIPHENHYDRAMINE HYDROCHLORIDE 50 MG/ML
25 INJECTION INTRAMUSCULAR; INTRAVENOUS EVERY 6 HOURS PRN
Status: DISCONTINUED | OUTPATIENT
Start: 2022-09-07 | End: 2022-09-07 | Stop reason: HOSPADM

## 2022-09-07 RX ORDER — ONDANSETRON 2 MG/ML
INJECTION INTRAMUSCULAR; INTRAVENOUS
Status: DISCONTINUED | OUTPATIENT
Start: 2022-09-07 | End: 2022-09-07

## 2022-09-07 RX ORDER — HYDROMORPHONE HYDROCHLORIDE 1 MG/ML
0.2 INJECTION, SOLUTION INTRAMUSCULAR; INTRAVENOUS; SUBCUTANEOUS EVERY 5 MIN PRN
Status: DISCONTINUED | OUTPATIENT
Start: 2022-09-07 | End: 2022-09-07 | Stop reason: HOSPADM

## 2022-09-07 RX ORDER — SODIUM CHLORIDE 0.9 % (FLUSH) 0.9 %
10 SYRINGE (ML) INJECTION
Status: DISCONTINUED | OUTPATIENT
Start: 2022-09-07 | End: 2022-09-07 | Stop reason: HOSPADM

## 2022-09-07 RX ORDER — LEVALBUTEROL INHALATION SOLUTION 0.63 MG/3ML
0.63 SOLUTION RESPIRATORY (INHALATION)
Status: DISCONTINUED | OUTPATIENT
Start: 2022-09-07 | End: 2022-09-07 | Stop reason: HOSPADM

## 2022-09-07 RX ORDER — ACETAMINOPHEN 500 MG
1000 TABLET ORAL
Status: COMPLETED | OUTPATIENT
Start: 2022-09-07 | End: 2022-09-07

## 2022-09-07 RX ORDER — SODIUM CHLORIDE 9 MG/ML
INJECTION, SOLUTION INTRAVENOUS CONTINUOUS
Status: DISCONTINUED | OUTPATIENT
Start: 2022-09-07 | End: 2022-09-07 | Stop reason: HOSPADM

## 2022-09-07 RX ORDER — FENTANYL CITRATE 50 UG/ML
INJECTION, SOLUTION INTRAMUSCULAR; INTRAVENOUS
Status: DISCONTINUED | OUTPATIENT
Start: 2022-09-07 | End: 2022-09-07

## 2022-09-07 RX ORDER — LIDOCAINE HYDROCHLORIDE 10 MG/ML
INJECTION, SOLUTION EPIDURAL; INFILTRATION; INTRACAUDAL; PERINEURAL
Status: DISCONTINUED | OUTPATIENT
Start: 2022-09-07 | End: 2022-09-07

## 2022-09-07 RX ORDER — CEFAZOLIN SODIUM/WATER 2 G/20 ML
2 SYRINGE (ML) INTRAVENOUS
Status: COMPLETED | OUTPATIENT
Start: 2022-09-07 | End: 2022-09-07

## 2022-09-07 RX ORDER — OXYCODONE HYDROCHLORIDE 5 MG/1
5 TABLET ORAL EVERY 6 HOURS PRN
Qty: 5 TABLET | Refills: 0 | Status: SHIPPED | OUTPATIENT
Start: 2022-09-07 | End: 2022-10-02

## 2022-09-07 RX ORDER — FENTANYL CITRATE 50 UG/ML
25 INJECTION, SOLUTION INTRAMUSCULAR; INTRAVENOUS EVERY 5 MIN PRN
Status: DISCONTINUED | OUTPATIENT
Start: 2022-09-07 | End: 2022-09-07 | Stop reason: HOSPADM

## 2022-09-07 RX ORDER — DEXMEDETOMIDINE HYDROCHLORIDE 100 UG/ML
INJECTION, SOLUTION INTRAVENOUS
Status: DISCONTINUED | OUTPATIENT
Start: 2022-09-07 | End: 2022-09-07

## 2022-09-07 RX ORDER — DEXAMETHASONE SODIUM PHOSPHATE 4 MG/ML
INJECTION, SOLUTION INTRA-ARTICULAR; INTRALESIONAL; INTRAMUSCULAR; INTRAVENOUS; SOFT TISSUE
Status: DISCONTINUED | OUTPATIENT
Start: 2022-09-07 | End: 2022-09-07

## 2022-09-07 RX ADMIN — ACETAMINOPHEN 1000 MG: 500 TABLET ORAL at 11:09

## 2022-09-07 RX ADMIN — ONDANSETRON 4 MG: 2 INJECTION INTRAMUSCULAR; INTRAVENOUS at 02:09

## 2022-09-07 RX ADMIN — SODIUM CHLORIDE: 0.9 INJECTION, SOLUTION INTRAVENOUS at 01:09

## 2022-09-07 RX ADMIN — ROCURONIUM BROMIDE 10 MG: 10 INJECTION, SOLUTION INTRAVENOUS at 02:09

## 2022-09-07 RX ADMIN — DEXAMETHASONE SODIUM PHOSPHATE 4 MG: 4 INJECTION INTRA-ARTICULAR; INTRALESIONAL; INTRAMUSCULAR; INTRAVENOUS; SOFT TISSUE at 02:09

## 2022-09-07 RX ADMIN — LIDOCAINE HYDROCHLORIDE 100 MG: 10 INJECTION, SOLUTION EPIDURAL; INFILTRATION; INTRACAUDAL at 02:09

## 2022-09-07 RX ADMIN — FENTANYL CITRATE 25 MCG: 50 INJECTION, SOLUTION INTRAMUSCULAR; INTRAVENOUS at 02:09

## 2022-09-07 RX ADMIN — SODIUM CHLORIDE: 0.9 INJECTION, SOLUTION INTRAVENOUS at 11:09

## 2022-09-07 RX ADMIN — ROCURONIUM BROMIDE 40 MG: 10 INJECTION, SOLUTION INTRAVENOUS at 02:09

## 2022-09-07 RX ADMIN — Medication 2 G: at 02:09

## 2022-09-07 RX ADMIN — SUGAMMADEX 200 MG: 100 INJECTION, SOLUTION INTRAVENOUS at 02:09

## 2022-09-07 RX ADMIN — PROPOFOL 140 MG: 10 INJECTION, EMULSION INTRAVENOUS at 02:09

## 2022-09-07 RX ADMIN — FENTANYL CITRATE 50 MCG: 50 INJECTION, SOLUTION INTRAMUSCULAR; INTRAVENOUS at 02:09

## 2022-09-07 RX ADMIN — DEXMEDETOMIDINE HYDROCHLORIDE 16 MCG: 100 INJECTION, SOLUTION, CONCENTRATE INTRAVENOUS at 02:09

## 2022-09-07 NOTE — TRANSFER OF CARE
"Anesthesia Transfer of Care Note    Patient: Javier Downs    Procedure(s) Performed: Procedure(s) (LRB):  LAPAROSCOPY, DIAGNOSTIC (N/A)    Patient location: [facility]    Anesthesia Type: general    Transport from OR: Transported from OR on 6-10 L/min O2 by face mask with adequate spontaneous ventilation    Post pain: adequate analgesia    Post assessment: no apparent anesthetic complications    Post vital signs: stable    Level of consciousness: awake    Nausea/Vomiting: no nausea/vomiting    Complications: none    Transfer of care protocol was followed      Last vitals:   Visit Vitals  /78 (BP Location: Left arm, Patient Position: Lying)   Pulse 68   Temp 36.4 °C (97.5 °F)   Resp 18   Ht 5' 7" (1.702 m)   Wt 56.2 kg (124 lb)   SpO2 100%   BMI 19.42 kg/m²     "

## 2022-09-07 NOTE — ANESTHESIA PROCEDURE NOTES
Intubation    Date/Time: 9/7/2022 2:11 PM  Performed by: Arnie Green MD  Authorized by: Arnie Green MD     Intubation:     Induction:  Intravenous    Intubated:  Postinduction    Mask Ventilation:  Easy mask    Attempts:  1    Attempted By:  Resident anesthesiologist    Method of Intubation:  Direct    Blade:  Violetta 3    Laryngeal View Grade: Grade I - full view of cords      Difficult Airway Encountered?: No      Complications:  None    Airway Device:  Oral endotracheal tube    Airway Device Size:  7.5    Style/Cuff Inflation:  Cuffed (inflated to minimal occlusive pressure)    Tube secured:  22    Secured at:  The lips    Placement Verified By:  Capnometry    Complicating Factors:  None    Findings Post-Intubation:  BS equal bilateral and atraumatic/condition of teeth unchanged

## 2022-09-07 NOTE — BRIEF OP NOTE
[facility] (2nd Fl)  Brief Operative Note    Surgery Date: 9/7/2022     Surgeon(s) and Role:     * Adrian Rodriguez MD - Primary     * Kimberly Lemus MD - Resident - Assisting     * Blanka Horvath MD - Resident - Assisting        Pre-op Diagnosis:  Metastatic colon cancer to liver [C18.9, C78.7]    Post-op Diagnosis:  Post-Op Diagnosis Codes:     * Metastatic colon cancer to liver [C18.9, C78.7]    Procedure(s) (LRB):  LAPAROSCOPY, DIAGNOSTIC (N/A)    Anesthesia: General    Operative Findings: no obvious intraperitoneal metastases. Liver lesions appreciated, but not biopsied. Peritoneal washings sent for cytology.     Estimated Blood Loss: 2cc  Specimens:   Specimen (24h ago, onward)       Start     Ordered    09/07/22 1443  Cytology, Fluid/Wash/Brush  Once        Question Answer Comment   Source: Peritoneal/abdominal/pelvic wash    Clinical Information: abdominal washings    Specific Site: abdomen    Other Requests: none    Release to patient Immediate        09/07/22 1451                      Discharge Note    OUTCOME: Patient tolerated treatment/procedure well without complication and is now ready for discharge.    DISPOSITION: Home or Self Care    FINAL DIAGNOSIS:  Metastatic colon cancer to liver    FOLLOWUP: In clinic    DISCHARGE INSTRUCTIONS:    Discharge Procedure Orders   Diet Adult Regular     Lifting restrictions   Order Comments: DO not lift more than 10 lbs for 6 weeks     No driving until:   Order Comments: No driving while under the influence of narcotics     Notify your health care provider if you experience any of the following:  temperature >100.4     Notify your health care provider if you experience any of the following:  persistent nausea and vomiting or diarrhea     Notify your health care provider if you experience any of the following:  severe uncontrolled pain     Notify your health care provider if you experience any of the following:  redness, tenderness, or signs of  infection (pain, swelling, redness, odor or green/yellow discharge around incision site)     Notify your health care provider if you experience any of the following:  difficulty breathing or increased cough     Notify your health care provider if you experience any of the following:  severe persistent headache     Notify your health care provider if you experience any of the following:  worsening rash     Notify your health care provider if you experience any of the following:  persistent dizziness, light-headedness, or visual disturbances     Notify your health care provider if you experience any of the following:  increased confusion or weakness     Shower on day dressing removed (No bath)   Order Comments: You can shower, no baths or hot tub for 2 weeks.

## 2022-09-07 NOTE — PLAN OF CARE
Chart reviewed. Pre-op nursing care and safe surgery checklist complete. daughter at bedside. Patient belongings given to family.

## 2022-09-07 NOTE — BRIEF OP NOTE
[facility] ([facility])  Brief Operative Note    Surgery Date: 9/7/2022     Surgeon(s) and Role:     * Adrian Rodriguez MD - Primary     * Kimberly Lemus MD - Resident - Assisting     * Blanka Horvath MD - Resident - Assisting        Pre-op Diagnosis:  Metastatic colon cancer to liver [C18.9, C78.7]    Post-op Diagnosis:  Post-Op Diagnosis Codes:     * Metastatic colon cancer to liver [C18.9, C78.7]    Procedure(s) (LRB):  LAPAROSCOPY, DIAGNOSTIC (N/A)    Anesthesia: General    Operative Findings: Access to abdomen was achieved under direct visualization at Vasquez's point. Two additional port sites were placed for graspers. Diagnostic laparoscopy performed with no notable suspicious tissue identified or biopsied. Peritoneal fluid washing performed with cytology sent to pathology. Patient tolerated the procedure well.      Estimated Blood Loss: * No values recorded between 9/7/2022  2:20 PM and 9/7/2022  3:03 PM *         Specimens:   Specimen (24h ago, onward)       Start     Ordered    09/07/22 1443  Cytology, Fluid/Wash/Brush  Once        Question Answer Comment   Source: Peritoneal/abdominal/pelvic wash    Clinical Information: abdominal washings    Specific Site: abdomen    Other Requests: none    Release to patient Immediate        09/07/22 1451                      Discharge Note    OUTCOME: Patient tolerated treatment/procedure well without complication and is now ready for discharge.    DISPOSITION: Home or Self Care    FINAL DIAGNOSIS:  Metastatic colon cancer to liver    FOLLOWUP: With primary care provider    DISCHARGE INSTRUCTIONS:    Discharge Procedure Orders   Diet Adult Regular     Lifting restrictions   Order Comments: DO not lift more than 10 lbs for 6 weeks     No driving until:   Order Comments: No driving while under the influence of narcotics     Notify your health care provider if you experience any of the following:  temperature >100.4     Notify your health care provider if you  experience any of the following:  persistent nausea and vomiting or diarrhea     Notify your health care provider if you experience any of the following:  severe uncontrolled pain     Notify your health care provider if you experience any of the following:  redness, tenderness, or signs of infection (pain, swelling, redness, odor or green/yellow discharge around incision site)     Notify your health care provider if you experience any of the following:  difficulty breathing or increased cough     Notify your health care provider if you experience any of the following:  severe persistent headache     Notify your health care provider if you experience any of the following:  worsening rash     Notify your health care provider if you experience any of the following:  persistent dizziness, light-headedness, or visual disturbances     Notify your health care provider if you experience any of the following:  increased confusion or weakness     Shower on day dressing removed (No bath)   Order Comments: You can shower, no baths or hot tub for 2 weeks.

## 2022-09-07 NOTE — PLAN OF CARE
Reviewed discharge instructions with patient.  Patient verbalizes understanding.  Vital stable, no complaints noted.

## 2022-09-07 NOTE — OP NOTE
[facility] ([facility])  General Surgery  Operative Note    SUMMARY     Date of Procedure: 9/7/2022     Procedure: Procedure(s) (LRB):  LAPAROSCOPY, DIAGNOSTIC (N/A)       Surgeon(s) and Role:     * Adrian Rodriguez MD - Primary     * Kibmerly Lemus MD - Resident - Assisting     * Blanka Horvath MD - Resident - Assisting        Pre-Operative Diagnosis: Metastatic colon cancer to liver [C18.9, C78.7]    Post-Operative Diagnosis: Post-Op Diagnosis Codes:     * Metastatic colon cancer to liver [C18.9, C78.7]    Anesthesia: General    Technical Procedures Used:   Diagnostic laparoscopy  Peritoneal washings for cytology    Description of the Findings of the Procedure:   No obvious carcinomatosis on the peritoneal lining. No mets at the base of the small bowel mesentery near the ligament of treitz. No omental thickening. Known left sided liver lesions visualized. 1L of sterile saline irrigated and sent for cytology.     Indication: Javier Downs is a 72 y.o. with metastatic colorectal adenocarcinoma with mets to the liver. He is undergoing chemotherapy and is being considered for resection of the colon primary as well as the left liver metastases. We recommended proceeding with diagnostic laparoscopy to rule out sonia carcinomatosis prior to proceeding with a larger oncologic resection. Informed consent was obtained.     Procedure in Detail:   The patient was preoperatively evaluated and consented. He was taken back to the operating room, placed on the operating table, and monitors were applied. He then underwent the smooth induction of general endotracheal anesthesia. The abdomen was prepped and draped in the usual sterile fashion. After the administration of appropriate perioperative antibiotics a time out was performed. When all were in agreement, we proceeded with the case.     A 5 mm Optiview trocar was placed at jenkins's point.  The laparoscopic camera was used to visualize the lining of the  intraperitoneal cavity.  There were no obvious implants of metastatic disease.  Two additional 5 mm ports were placed in the left lower quadrant and in the suprapubic area.  Atraumatic graspers were used to examine the omentum and elevate the transverse colon.  There was no omental thickening nor with her implants at the base of the small bowel mesentery near the ligament of Treitz.  The left liver metastatic lesions were visible on the anterior surface.  The liver, however, was soft and easily mobile.  No soft tissue biopsies were sent for pathology.  Using a suction , 1 L of sterile saline was irrigated into the peritoneum focusing around the known primary mass.  This fluid was then suctioned of the abdomen and sent for cytology for peritoneal washings.  The ports removed under visualization in the skin was then closed with buried 4-0 Monocryl sutures.  Dermabond dressing was applied.  The patient was extubated and taken to the recovery room in hemodynamically stable condition.  All counts were correct at the conclusion of the case.        Complications: No    Estimated Blood Loss (EBL): 2cc           Implants: * No implants in log *    Specimens:   Specimen (24h ago, onward)       Start     Ordered    09/07/22 1443  Cytology, Fluid/Wash/Brush  Once        Question Answer Comment   Source: Peritoneal/abdominal/pelvic wash    Clinical Information: abdominal washings    Specific Site: abdomen    Other Requests: none    Release to patient Immediate        09/07/22 4294                            Condition: Good    Disposition: PACU - hemodynamically stable.    Attestation: Dr. Adrian Rodriguez was present and scrubbed for all key portions of the case.    Kimberly Lemus MD  General Surgery Resident, PGY IV  Pager [phone]     .

## 2022-09-08 NOTE — ANESTHESIA POSTPROCEDURE EVALUATION
Anesthesia Post Evaluation    Patient: Javier Downs    Procedure(s) Performed: Procedure(s) (LRB):  LAPAROSCOPY, DIAGNOSTIC (N/A)    Final Anesthesia Type: general      Patient location during evaluation: PACU  Patient participation: Yes- Able to Participate  Level of consciousness: awake and alert and oriented  Post-procedure vital signs: reviewed and stable  Pain management: adequate  Airway patency: patent    PONV status at discharge: No PONV  Anesthetic complications: no      Cardiovascular status: stable  Respiratory status: unassisted, spontaneous ventilation and room air  Hydration status: euvolemic  Follow-up not needed.  Comments: Seen yesterday in [facility], note written today          Vitals Value Taken Time   /78 09/07/22 1617   Temp 36.7 °C (98 °F) 09/07/22 1600   Pulse 77 09/07/22 1627   Resp 50 09/07/22 1627   SpO2 100 % 09/07/22 1622   Vitals shown include unvalidated device data.      No case tracking events are documented in the log.      Pain/Jill Score: Pain Rating Prior to Med Admin: 0 (9/7/2022 11:26 AM)  Jill Score: 10 (9/7/2022  3:30 PM)

## 2022-09-14 ENCOUNTER — OFFICE VISIT (OUTPATIENT)
Dept: HEMATOLOGY/ONCOLOGY | Facility: CLINIC | Age: 72
DRG: 375 | End: 2022-09-14
Payer: MEDICARE

## 2022-09-14 VITALS
BODY MASS INDEX: 19.14 KG/M2 | DIASTOLIC BLOOD PRESSURE: 88 MMHG | HEIGHT: 67 IN | RESPIRATION RATE: 18 BRPM | HEART RATE: 86 BPM | WEIGHT: 121.94 LBS | OXYGEN SATURATION: 100 % | TEMPERATURE: 98 F | SYSTOLIC BLOOD PRESSURE: 134 MMHG

## 2022-09-14 DIAGNOSIS — N17.9 AKI (ACUTE KIDNEY INJURY): ICD-10-CM

## 2022-09-14 DIAGNOSIS — T45.1X5A ANEMIA ASSOCIATED WITH CHEMOTHERAPY: ICD-10-CM

## 2022-09-14 DIAGNOSIS — T45.1X5A IMMUNODEFICIENCY DUE TO CHEMOTHERAPY: ICD-10-CM

## 2022-09-14 DIAGNOSIS — C78.7 METASTATIC COLON CANCER TO LIVER: Primary | ICD-10-CM

## 2022-09-14 DIAGNOSIS — D84.821 IMMUNODEFICIENCY DUE TO CHEMOTHERAPY: ICD-10-CM

## 2022-09-14 DIAGNOSIS — I10 PRIMARY HYPERTENSION: ICD-10-CM

## 2022-09-14 DIAGNOSIS — Z79.899 IMMUNODEFICIENCY DUE TO CHEMOTHERAPY: ICD-10-CM

## 2022-09-14 DIAGNOSIS — C18.9 METASTATIC COLON CANCER TO LIVER: Primary | ICD-10-CM

## 2022-09-14 DIAGNOSIS — R80.9 PROTEINURIA, UNSPECIFIED TYPE: ICD-10-CM

## 2022-09-14 DIAGNOSIS — D64.81 ANEMIA ASSOCIATED WITH CHEMOTHERAPY: ICD-10-CM

## 2022-09-14 PROCEDURE — 99999 PR PBB SHADOW E&M-EST. PATIENT-LVL III: ICD-10-PCS | Mod: PBBFAC,,, | Performed by: PHYSICIAN ASSISTANT

## 2022-09-14 PROCEDURE — 99999 PR PBB SHADOW E&M-EST. PATIENT-LVL III: CPT | Mod: PBBFAC,,, | Performed by: PHYSICIAN ASSISTANT

## 2022-09-14 PROCEDURE — 99215 PR OFFICE/OUTPT VISIT, EST, LEVL V, 40-54 MIN: ICD-10-PCS | Mod: S$PBB,,, | Performed by: PHYSICIAN ASSISTANT

## 2022-09-14 PROCEDURE — 99213 OFFICE O/P EST LOW 20 MIN: CPT | Mod: PBBFAC | Performed by: PHYSICIAN ASSISTANT

## 2022-09-14 PROCEDURE — 99215 OFFICE O/P EST HI 40 MIN: CPT | Mod: S$PBB,,, | Performed by: PHYSICIAN ASSISTANT

## 2022-09-14 RX ORDER — EPINEPHRINE 0.3 MG/.3ML
0.3 INJECTION SUBCUTANEOUS ONCE AS NEEDED
Status: CANCELLED | OUTPATIENT
Start: 2022-11-16

## 2022-09-14 RX ORDER — DIPHENHYDRAMINE HYDROCHLORIDE 50 MG/ML
50 INJECTION INTRAMUSCULAR; INTRAVENOUS ONCE AS NEEDED
Status: CANCELLED | OUTPATIENT
Start: 2022-11-16

## 2022-09-14 RX ORDER — SODIUM CHLORIDE 0.9 % (FLUSH) 0.9 %
10 SYRINGE (ML) INJECTION
Status: CANCELLED | OUTPATIENT
Start: 2022-11-16

## 2022-09-14 RX ORDER — HEPARIN 100 UNIT/ML
500 SYRINGE INTRAVENOUS
Status: CANCELLED | OUTPATIENT
Start: 2022-11-16

## 2022-09-14 NOTE — PROGRESS NOTES
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 Downs is a 72 y.o. male   70 years old pleasant AA gentleman, with history of hypertension, presenting with two weeks duration of abdominal cramps, diarrhea, nausea and vomiting. His symptoms started gradually with intermittent generalize crampy abdominal pain, worse with food. That was associated with nausea, reflux and occaisonal vomiting. He tried pepto-bismol and imodium with no relief, and hence he presented to ER.   He lost significant amount of weight, but cannot quantify the amount or the time period. He has also been feeling weaker than usual.   He hasn't seen a healthcare provider in over than 10 years. He has never had a colonoscopy.   In the ER. His labs were significant of microcytic anemia of 9.7 g/dl, MCV 77, and Platelets counts of 495. CT of the abdomen and pelvis showed " Large mass in the transverse colon highly suspicious for adenocarcinoma resulting in at least high-grade partial obstruction with dilation of proximal colon and small bowel. Soft tissue nodules in the adjacent mesentery are suspicious for pily metastases and/or mesenteric implants.  Numerous hepatic masses measuring up to 11.2 cm most likely related to metastatic disease.  There also several " "small subcapsular lesions which could reflect additional metastatic disease"   CT chest was negative to metastatic disease.   He underwent colonoscopy and stent placement. 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 HER 2 negative, VIRI/SHERI NGS was cancelled due to insufficient quantity   Guardant 360 was sent, negative for KRAS, NRAS, BRAF     He started palliative chemotherapy with FOLFOX+Pema     Restaging scans on 07/22/2021 showed significant decrease in size trasverse colon mass as well liver metastatic lesion.   He was switched to maintenance 5FU+Pema from C8    Restaging scans from 10/05/2021 (after C11) showing stable-mildly improved liver mets.   Restaging scans from 12/28/2021 (after C17) showing stable disease.   Restaging scans from 03/22/22 show stable disease.     MRI on 7/18/22    Impression:     Slight increase in size of the large hepatic metastasis when accounting for differences in imaging technique.  No new disease.     Remaining 2 liver lesions remain stable in size.  The larger lesion in the left hepatic lobe has features typical of a hemangioma.  Lesion at the hepatic dome is difficult to fully characterize due to location and breathing motion artifact, though may represent a hemangioma as well.  This can be followed.     Additional stable chronic findings as above.    Interval History:   He presents today with his daughter prior to cycle 36 of 5FU and Avastin. He feels well without specific complaints.  No change in bowel movements, appetite or any new concerns. Denies worsening pain. Had a diagnostic lap with Dr. Rodriguez on 9/7/2022. He is eating and has a good appetite. Most recent imaging study performed on 7/18/2022 displayed an increase size in one of the liver lesions, so we added Oxaliplatin back to his chemotherapy with the previous cycle. " He tolerated it well.     Presents with his daughter today. ECOG status is 1.       Past Medical History:   Past Medical History:   Diagnosis Date    MARIA A (acute kidney injury) 8/18/2022    Hypertension     Metastatic colon cancer to liver 4/22/2021       Past Surgical HIstory:   Past Surgical History:   Procedure Laterality Date    COLONOSCOPY N/A 4/20/2021    Procedure: COLONOSCOPY with possible stent;  Surgeon: EDILMA Bonilla MD;  Location: [facility] ENDO (2ND FLR);  Service: Colon and Rectal;  Laterality: N/A;    DIAGNOSTIC LAPAROSCOPY N/A 9/7/2022    Procedure: LAPAROSCOPY, DIAGNOSTIC;  Surgeon: Adrian Rodriguez MD;  Location: [facility] OR 2ND FLR;  Service: General;  Laterality: N/A;    INSERTION OF TUNNELED CENTRAL VENOUS CATHETER (CVC) WITH SUBCUTANEOUS PORT N/A 5/3/2021    Procedure: [id number]-K-CATH;  Surgeon: Francisco Layton MD;  Location: [facility] OR 2ND FLR;  Service: Vascular;  Laterality: N/A;       Family History: History reviewed. No pertinent family history.    Social History:  reports that he has never smoked. He has never used smokeless tobacco. He reports current alcohol use.    Allergies:  Review of patient's allergies indicates:  No Known Allergies    Medications:  Current Outpatient Medications   Medication Sig Dispense Refill    acetaminophen (TYLENOL) 500 MG tablet Take 1 tablet (500 mg total) by mouth every 6 (six) hours as needed for Pain (alternate with ibuprofen).  0    amLODIPine (NORVASC) 10 MG tablet Take 1 tablet (10 mg total) by mouth once daily. 90 tablet 3    ibuprofen (ADVIL,MOTRIN) 400 MG tablet Take 1 tablet (400 mg total) by mouth every 6 (six) hours as needed for Other (pain). Alternate with tylenol      LIDOcaine-prilocaine (EMLA) cream Apply topically as needed (Apply one hour before treatment). 30 g 5    ondansetron (ZOFRAN) 4 MG tablet Take 1 tablet (4 mg total) by mouth every 8 (eight) hours as needed for Nausea. 30 tablet 3    oxyCODONE (ROXICODONE) 5 MG immediate release  "tablet Take 1 tablet (5 mg total) by mouth every 6 (six) hours as needed for Pain. 5 tablet 0     Current Facility-Administered Medications   Medication Dose Route Frequency Provider Last Rate Last Admin    sars-cov-2 (covid-19) 30 mcg/0.3 ml injection 0.3 mL  0.3 mL Intramuscular 1 time in Clinic/HOD Idania Rock LPN           Review of Systems   Constitutional:  Negative for appetite change, fatigue, fever and unexpected weight change.   HENT:  Negative for congestion and nosebleeds.    Eyes:  Negative for pain and visual disturbance.   Respiratory:  Negative for cough, chest tightness, shortness of breath and wheezing.    Cardiovascular:  Negative for chest pain, palpitations and leg swelling.   Gastrointestinal:  Negative for abdominal pain, blood in stool, constipation, diarrhea, nausea and vomiting.   Genitourinary:  Negative for difficulty urinating, flank pain, frequency, hematuria and urgency.   Musculoskeletal:  Negative for arthralgias, back pain and myalgias.   Neurological:  Negative for dizziness, weakness, numbness and headaches.   Psychiatric/Behavioral:  The patient is not nervous/anxious.      ECOG Performance Status: 1   Objective:      Vitals:   Vitals:    09/14/22 1419   BP: 134/88   BP Location: Left arm   Patient Position: Sitting   BP Method: Medium (Automatic)   Pulse: 86   Resp: 18   Temp: 97.9 °F (36.6 °C)   TempSrc: Oral   SpO2: 100%   Weight: 55.3 kg (121 lb 14.6 oz)   Height: 5' 7" (1.702 m)     Physical Exam  Constitutional:       General: He is not in acute distress.     Appearance: Normal appearance.   HENT:      Head: Normocephalic and atraumatic.   Eyes:      Pupils: Pupils are equal, round, and reactive to light.   Cardiovascular:      Rate and Rhythm: Normal rate and regular rhythm.      Pulses: Normal pulses.      Heart sounds: Normal heart sounds. No murmur heard.  Pulmonary:      Effort: Pulmonary effort is normal. No respiratory distress.      Breath sounds: Normal breath " sounds. No wheezing.   Abdominal:      General: Abdomen is flat. Bowel sounds are normal. There is no distension.      Palpations: Abdomen is soft. There is no mass.      Tenderness: There is no abdominal tenderness.   Musculoskeletal:         General: No swelling or deformity. Normal range of motion.   Skin:     Coloration: Skin is not jaundiced.   Neurological:      General: No focal deficit present.      Mental Status: He is alert and oriented to person, place, and time. Mental status is at baseline.   Psychiatric:         Mood and Affect: Mood normal.       Laboratory Data:  Lab Visit on 09/14/2022   Component Date Value Ref Range Status    WBC 09/14/2022 7.56  3.90 - 12.70 K/uL Final    RBC 09/14/2022 4.77  4.60 - 6.20 M/uL Final    Hemoglobin 09/14/2022 14.1  14.0 - 18.0 g/dL Final    Hematocrit 09/14/2022 42.9  40.0 - 54.0 % Final    MCV 09/14/2022 90  82 - 98 fL Final    MCH 09/14/2022 29.6  27.0 - 31.0 pg Final    MCHC 09/14/2022 32.9  32.0 - 36.0 g/dL Final    RDW 09/14/2022 14.9 (H)  11.5 - 14.5 % Final    Platelets 09/14/2022 312  150 - 450 K/uL Final    MPV 09/14/2022 9.5  9.2 - 12.9 fL Final    Immature Granulocytes 09/14/2022 0.3  0.0 - 0.5 % Final    Gran # (ANC) 09/14/2022 4.1  1.8 - 7.7 K/uL Final    Immature Grans (Abs) 09/14/2022 0.02  0.00 - 0.04 K/uL Final    Comment: Mild elevation in immature granulocytes is non specific and   can be seen in a variety of conditions including stress response,   acute inflammation, trauma and pregnancy. Correlation with other   laboratory and clinical findings is essential.      Lymph # 09/14/2022 2.4  1.0 - 4.8 K/uL Final    Mono # 09/14/2022 1.0  0.3 - 1.0 K/uL Final    Eos # 09/14/2022 0.1  0.0 - 0.5 K/uL Final    Baso # 09/14/2022 0.04  0.00 - 0.20 K/uL Final    nRBC 09/14/2022 0  0 /100 WBC Final    Gran % 09/14/2022 53.8  38.0 - 73.0 % Final    Lymph % 09/14/2022 31.5  18.0 - 48.0 % Final    Mono % 09/14/2022 13.2  4.0 - 15.0 % Final    Eosinophil %  09/14/2022 0.7  0.0 - 8.0 % Final    Basophil % 09/14/2022 0.5  0.0 - 1.9 % Final    Differential Method 09/14/2022 Automated   Final    Sodium 09/14/2022 136  136 - 145 mmol/L Final    Potassium 09/14/2022 3.8  3.5 - 5.1 mmol/L Final    Chloride 09/14/2022 102  95 - 110 mmol/L Final    CO2 09/14/2022 26  23 - 29 mmol/L Final    Glucose 09/14/2022 95  70 - 110 mg/dL Final    BUN 09/14/2022 10  8 - 23 mg/dL Final    Creatinine 09/14/2022 0.9  0.5 - 1.4 mg/dL Final    Calcium 09/14/2022 9.4  8.7 - 10.5 mg/dL Final    Total Protein 09/14/2022 7.9  6.0 - 8.4 g/dL Final    Albumin 09/14/2022 3.2 (L)  3.5 - 5.2 g/dL Final    Total Bilirubin 09/14/2022 0.9  0.1 - 1.0 mg/dL Final    Comment: For infants and newborns, interpretation of results should be based  on gestational age, weight and in agreement with clinical  observations.    Premature Infant recommended reference ranges:  Up to 24 hours.............<8.0 mg/dL  Up to 48 hours............<12.0 mg/dL  3-5 days..................<15.0 mg/dL  6-29 days.................<15.0 mg/dL      Alkaline Phosphatase 09/14/2022 84  55 - 135 U/L Final    AST 09/14/2022 17  10 - 40 U/L Final    ALT 09/14/2022 8 (L)  10 - 44 U/L Final    Anion Gap 09/14/2022 8  8 - 16 mmol/L Final    eGFR 09/14/2022 >60.0  >60 mL/min/1.73 m^2 Final    CEA 09/14/2022 11.0 (H)  0.0 - 5.0 ng/mL Final    Comment: CEA Normal Range:  Non-Smokers: 0-3.0 ng/mL  Smokers:     0-5.0 ng/mL    The testing method is a chemiluminescent microparticle immunoassay   manufactured by [employer] and performed on the [employer]   or   [employer] system. Values obtained with different assay manufacturers   for   methods may be different and cannot be used interchangeably.     CEA stable at 11.0 previously, 11.4 from 12.7.      Assessment and Plan        1. 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 Primary hypertension    4. MARIA A (acute kidney injury)    5. Proteinuria,  unspecified type    6. Anemia associated with chemotherapy          1,2.  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  Guardant 360 was negative for BRAF, VIRI, HER2 amplification.   Pretreatment CEA 57.  We had a long and sonia discussion with him about his diagnosis. Unfortunately, the disease is not curable but remains treatable and he has good performance status.   Restaging scans after 7 cycles showed significant reduction in colonic mass and liver mass.   we switched to maintenance as of cycle 8 with 5FU + Pema  Restaging scans from March 2022 show stable disease.   MRI on 7/18/22 showing hepatic progression of disease in the right hepatic lobe noted on the exam. CT CAP on 8/26 shows some mild progression in both liver metastases.    Discussed case at colorectal tumor board 8/31/22 and had a diagnostic lap performed by Dr. Rodriguez on 9/7 to assess for any occult peritoneal disease. Findings from the diagnostic lap were favorable, given that it was negative for omental thickening or with implants at the base of the small bowel mesentery. Fluid from around from around the primary mass was sent for cytology, which is pending. If the cytology is negative, will consider liver metastasectomy and resection of primary tumor with Dr. Bonilla. Therefore, we will hold off on Y-90 treatment. This was discussed with the patient and daughter who is in agreement.     Will await the results of the cytology. Meanwhile given mild progression on scans, we added back oxaliplatin with the last cycle and held Avastin due to his procedure on 9/7/2022. He tolerated this well without complication.   Doing well today  Proceed with FOLFOX tomorrow. Continue to hold avastin  RTC in 2 weeks for next cycle.    3. Hypertension   --Well Controlled. Will hold Avastin for now  --continue with Amlodipine .     4. MARIA A, proteinuria  --Improved renal function. Cr 0.9.  - Holding Avastin for now, given that he is a  potential candidate for surgical resection.   -- Monitor U/A.    5. Anemia  -- Mild. Monitor.    Pte and family members displayed understanding of the above encounter and treatment plan. All thoughtful questions were answered to their satisfaction. Pte was advised to notify the care team or proceed to the ER if signs and symptoms worsen.Pt was discussed with Dr. Schuster as well.       FAN Meier, PA-C  Physician Assistant Certified  Dept of Hematology/Oncology  PAAidenC to Dr. Mueller, Dr. Schuster and Dr. Castellon     Route Chart for Scheduling    Med Onc Chart Routing      Follow up with physician 2 weeks. for FOLFOX   Follow up with RACHEL 4 weeks. for FOLFOX   Infusion scheduling note    Injection scheduling note    Labs CBC, CMP, CEA and urinalysis   Lab interval: every 2 weeks     Imaging    Pharmacy appointment    Other referrals        Treatment Plan Information   OP COLORECTAL FOLFOX + BEVACIZUMAB Q2W   Torres Pantoja MD   Upcoming Treatment Dates - OP COLORECTAL FOLFOX + BEVACIZUMAB Q2W    9/15/2022       Chemotherapy       oxaliplatin (ELOXATIN) 85 mg/m2 = 138 mg in dextrose 5 % chemo infusion       fluorouraciL 2,400 mg/m2 = 3,890 mg in sodium chloride 0.9% 100 mL chemo infusion       Antiemetics       palonosetron (ALOXI) 0.25 mg with Dexamethasone (DECADRON) 12 mg in NS 50 mL IVPB  9/29/2022       Chemotherapy       oxaliplatin (ELOXATIN) in dextrose 5 % 500 mL chemo infusion       fluorouraciL in sodium chloride 0.9% 100 mL chemo infusion       Antiemetics       palonosetron (ALOXI) 0.25 mg with Dexamethasone (DECADRON) 12 mg in NS 50 mL IVPB

## 2022-09-15 ENCOUNTER — HOSPITAL ENCOUNTER (INPATIENT)
Facility: HOSPITAL | Age: 72
LOS: 6 days | Discharge: HOME | DRG: 375 | End: 2022-09-21
Attending: EMERGENCY MEDICINE | Admitting: COLON & RECTAL SURGERY
Payer: MEDICARE

## 2022-09-15 DIAGNOSIS — K56.609 SMALL BOWEL OBSTRUCTION: Primary | ICD-10-CM

## 2022-09-15 DIAGNOSIS — Z46.59 ENCOUNTER FOR NASOGASTRIC (NG) TUBE PLACEMENT: ICD-10-CM

## 2022-09-15 DIAGNOSIS — C18.9 METASTATIC COLON CANCER TO LIVER: ICD-10-CM

## 2022-09-15 DIAGNOSIS — K65.9 PERITONITIS: ICD-10-CM

## 2022-09-15 DIAGNOSIS — C78.7 COLON CANCER METASTASIZED TO LIVER: ICD-10-CM

## 2022-09-15 DIAGNOSIS — C78.7 METASTATIC COLON CANCER TO LIVER: ICD-10-CM

## 2022-09-15 DIAGNOSIS — N17.9 AKI (ACUTE KIDNEY INJURY): ICD-10-CM

## 2022-09-15 DIAGNOSIS — C18.9 COLON CANCER METASTASIZED TO LIVER: ICD-10-CM

## 2022-09-15 LAB
ALBUMIN SERPL BCP-MCNC: 3.3 G/DL (ref 3.5–5.2)
ALP SERPL-CCNC: 84 U/L (ref 55–135)
ALT SERPL W/O P-5'-P-CCNC: 10 U/L (ref 10–44)
ANION GAP SERPL CALC-SCNC: 9 MMOL/L (ref 8–16)
AST SERPL-CCNC: 22 U/L (ref 10–40)
BASOPHILS # BLD AUTO: 0.01 K/UL (ref 0–0.2)
BASOPHILS NFR BLD: 0.2 % (ref 0–1.9)
BILIRUB SERPL-MCNC: 1.3 MG/DL (ref 0.1–1)
BUN SERPL-MCNC: 22 MG/DL (ref 8–23)
CALCIUM SERPL-MCNC: 9.8 MG/DL (ref 8.7–10.5)
CHLORIDE SERPL-SCNC: 99 MMOL/L (ref 95–110)
CO2 SERPL-SCNC: 23 MMOL/L (ref 23–29)
CREAT SERPL-MCNC: 1 MG/DL (ref 0.5–1.4)
DIFFERENTIAL METHOD: ABNORMAL
EOSINOPHIL # BLD AUTO: 0 K/UL (ref 0–0.5)
EOSINOPHIL NFR BLD: 0 % (ref 0–8)
ERYTHROCYTE [DISTWIDTH] IN BLOOD BY AUTOMATED COUNT: 14.6 % (ref 11.5–14.5)
EST. GFR  (NO RACE VARIABLE): >60 ML/MIN/1.73 M^2
GLUCOSE SERPL-MCNC: 153 MG/DL (ref 70–110)
HCT VFR BLD AUTO: 44.9 % (ref 40–54)
HGB BLD-MCNC: 14.5 G/DL (ref 14–18)
IMM GRANULOCYTES # BLD AUTO: 0.01 K/UL (ref 0–0.04)
IMM GRANULOCYTES NFR BLD AUTO: 0.2 % (ref 0–0.5)
LACTATE SERPL-SCNC: 2.2 MMOL/L (ref 0.5–2.2)
LIPASE SERPL-CCNC: 13 U/L (ref 4–60)
LYMPHOCYTES # BLD AUTO: 0.7 K/UL (ref 1–4.8)
LYMPHOCYTES NFR BLD: 13.3 % (ref 18–48)
MCH RBC QN AUTO: 29.7 PG (ref 27–31)
MCHC RBC AUTO-ENTMCNC: 32.3 G/DL (ref 32–36)
MCV RBC AUTO: 92 FL (ref 82–98)
MONOCYTES # BLD AUTO: 0.8 K/UL (ref 0.3–1)
MONOCYTES NFR BLD: 15.2 % (ref 4–15)
NEUTROPHILS # BLD AUTO: 3.6 K/UL (ref 1.8–7.7)
NEUTROPHILS NFR BLD: 71.1 % (ref 38–73)
NRBC BLD-RTO: 0 /100 WBC
PLATELET # BLD AUTO: 345 K/UL (ref 150–450)
PMV BLD AUTO: 9.8 FL (ref 9.2–12.9)
POTASSIUM SERPL-SCNC: 4.8 MMOL/L (ref 3.5–5.1)
PROT SERPL-MCNC: 8.4 G/DL (ref 6–8.4)
RBC # BLD AUTO: 4.89 M/UL (ref 4.6–6.2)
SODIUM SERPL-SCNC: 131 MMOL/L (ref 136–145)
WBC # BLD AUTO: 5.05 K/UL (ref 3.9–12.7)

## 2022-09-15 PROCEDURE — 99285 EMERGENCY DEPT VISIT HI MDM: CPT | Mod: 25

## 2022-09-15 PROCEDURE — 25000003 PHARM REV CODE 250: Performed by: STUDENT IN AN ORGANIZED HEALTH CARE EDUCATION/TRAINING PROGRAM

## 2022-09-15 PROCEDURE — 87040 BLOOD CULTURE FOR BACTERIA: CPT | Mod: 59 | Performed by: EMERGENCY MEDICINE

## 2022-09-15 PROCEDURE — 96375 TX/PRO/DX INJ NEW DRUG ADDON: CPT

## 2022-09-15 PROCEDURE — 25500020 PHARM REV CODE 255: Performed by: EMERGENCY MEDICINE

## 2022-09-15 PROCEDURE — 83690 ASSAY OF LIPASE: CPT | Performed by: EMERGENCY MEDICINE

## 2022-09-15 PROCEDURE — 63600175 PHARM REV CODE 636 W HCPCS: Performed by: EMERGENCY MEDICINE

## 2022-09-15 PROCEDURE — 99285 EMERGENCY DEPT VISIT HI MDM: CPT | Mod: ,,, | Performed by: EMERGENCY MEDICINE

## 2022-09-15 PROCEDURE — 96374 THER/PROPH/DIAG INJ IV PUSH: CPT

## 2022-09-15 PROCEDURE — 99285 PR EMERGENCY DEPT VISIT,LEVEL V: ICD-10-PCS | Mod: ,,, | Performed by: EMERGENCY MEDICINE

## 2022-09-15 PROCEDURE — 80053 COMPREHEN METABOLIC PANEL: CPT | Performed by: EMERGENCY MEDICINE

## 2022-09-15 PROCEDURE — 63600175 PHARM REV CODE 636 W HCPCS: Performed by: STUDENT IN AN ORGANIZED HEALTH CARE EDUCATION/TRAINING PROGRAM

## 2022-09-15 PROCEDURE — 96376 TX/PRO/DX INJ SAME DRUG ADON: CPT

## 2022-09-15 PROCEDURE — 85025 COMPLETE CBC W/AUTO DIFF WBC: CPT | Performed by: EMERGENCY MEDICINE

## 2022-09-15 PROCEDURE — 83605 ASSAY OF LACTIC ACID: CPT | Performed by: EMERGENCY MEDICINE

## 2022-09-15 PROCEDURE — 20600001 HC STEP DOWN PRIVATE ROOM

## 2022-09-15 RX ORDER — HYDROMORPHONE HYDROCHLORIDE 1 MG/ML
0.5 INJECTION, SOLUTION INTRAMUSCULAR; INTRAVENOUS; SUBCUTANEOUS ONCE AS NEEDED
Status: COMPLETED | OUTPATIENT
Start: 2022-09-15 | End: 2022-09-15

## 2022-09-15 RX ORDER — ONDANSETRON 2 MG/ML
4 INJECTION INTRAMUSCULAR; INTRAVENOUS
Status: COMPLETED | OUTPATIENT
Start: 2022-09-15 | End: 2022-09-15

## 2022-09-15 RX ORDER — SODIUM CHLORIDE 9 MG/ML
INJECTION, SOLUTION INTRAVENOUS CONTINUOUS
Status: DISCONTINUED | OUTPATIENT
Start: 2022-09-15 | End: 2022-09-18

## 2022-09-15 RX ORDER — SODIUM CHLORIDE 0.9 % (FLUSH) 0.9 %
10 SYRINGE (ML) INJECTION
Status: DISCONTINUED | OUTPATIENT
Start: 2022-09-15 | End: 2022-09-21 | Stop reason: HOSPADM

## 2022-09-15 RX ORDER — ENOXAPARIN SODIUM 100 MG/ML
40 INJECTION SUBCUTANEOUS EVERY 24 HOURS
Status: DISCONTINUED | OUTPATIENT
Start: 2022-09-15 | End: 2022-09-21 | Stop reason: HOSPADM

## 2022-09-15 RX ORDER — MORPHINE SULFATE 2 MG/ML
2 INJECTION, SOLUTION INTRAMUSCULAR; INTRAVENOUS EVERY 4 HOURS PRN
Status: DISCONTINUED | OUTPATIENT
Start: 2022-09-15 | End: 2022-09-19

## 2022-09-15 RX ORDER — HYDROMORPHONE HYDROCHLORIDE 1 MG/ML
0.5 INJECTION, SOLUTION INTRAMUSCULAR; INTRAVENOUS; SUBCUTANEOUS
Status: COMPLETED | OUTPATIENT
Start: 2022-09-15 | End: 2022-09-15

## 2022-09-15 RX ORDER — ONDANSETRON 2 MG/ML
4 INJECTION INTRAMUSCULAR; INTRAVENOUS EVERY 6 HOURS PRN
Status: DISCONTINUED | OUTPATIENT
Start: 2022-09-15 | End: 2022-09-21 | Stop reason: HOSPADM

## 2022-09-15 RX ORDER — NALOXONE HCL 0.4 MG/ML
0.02 VIAL (ML) INJECTION
Status: DISCONTINUED | OUTPATIENT
Start: 2022-09-15 | End: 2022-09-21 | Stop reason: HOSPADM

## 2022-09-15 RX ORDER — DEXTROSE MONOHYDRATE, SODIUM CHLORIDE, AND POTASSIUM CHLORIDE 50; 1.49; 9 G/1000ML; G/1000ML; G/1000ML
125 INJECTION, SOLUTION INTRAVENOUS CONTINUOUS
Status: DISCONTINUED | OUTPATIENT
Start: 2022-09-15 | End: 2022-09-15

## 2022-09-15 RX ORDER — MORPHINE SULFATE 4 MG/ML
4 INJECTION, SOLUTION INTRAMUSCULAR; INTRAVENOUS EVERY 4 HOURS PRN
Status: DISCONTINUED | OUTPATIENT
Start: 2022-09-15 | End: 2022-09-19

## 2022-09-15 RX ORDER — PROCHLORPERAZINE EDISYLATE 5 MG/ML
5 INJECTION INTRAMUSCULAR; INTRAVENOUS EVERY 6 HOURS PRN
Status: DISCONTINUED | OUTPATIENT
Start: 2022-09-15 | End: 2022-09-21 | Stop reason: HOSPADM

## 2022-09-15 RX ADMIN — ONDANSETRON 4 MG: 2 INJECTION INTRAMUSCULAR; INTRAVENOUS at 04:09

## 2022-09-15 RX ADMIN — SODIUM CHLORIDE: 0.9 INJECTION, SOLUTION INTRAVENOUS at 05:09

## 2022-09-15 RX ADMIN — HYDROMORPHONE HYDROCHLORIDE 0.5 MG: 1 INJECTION, SOLUTION INTRAMUSCULAR; INTRAVENOUS; SUBCUTANEOUS at 07:09

## 2022-09-15 RX ADMIN — MORPHINE SULFATE 2 MG: 2 INJECTION, SOLUTION INTRAMUSCULAR; INTRAVENOUS at 10:09

## 2022-09-15 RX ADMIN — SODIUM CHLORIDE, SODIUM LACTATE, POTASSIUM CHLORIDE, AND CALCIUM CHLORIDE 1000 ML: .6; .31; .03; .02 INJECTION, SOLUTION INTRAVENOUS at 06:09

## 2022-09-15 RX ADMIN — HYDROMORPHONE HYDROCHLORIDE 0.5 MG: 1 INJECTION, SOLUTION INTRAMUSCULAR; INTRAVENOUS; SUBCUTANEOUS at 04:09

## 2022-09-15 RX ADMIN — DEXTROSE MONOHYDRATE, SODIUM CHLORIDE, AND POTASSIUM CHLORIDE 125 ML/HR: 50; 9; 1.49 INJECTION, SOLUTION INTRAVENOUS at 11:09

## 2022-09-15 RX ADMIN — ENOXAPARIN SODIUM 40 MG: 100 INJECTION SUBCUTANEOUS at 05:09

## 2022-09-15 RX ADMIN — MORPHINE SULFATE 2 MG: 2 INJECTION, SOLUTION INTRAMUSCULAR; INTRAVENOUS at 06:09

## 2022-09-15 RX ADMIN — IOHEXOL 75 ML: 350 INJECTION, SOLUTION INTRAVENOUS at 04:09

## 2022-09-15 NOTE — ASSESSMENT & PLAN NOTE
72-year-old male currently being treated for metastatic colon cancer to the liver. Has been on a combination of Avastin and Folfox (last 08/18/22) and received a transverse colon stent 04/2021 as well as a diagnostic lap to rule out carcinomatosis 09/07/22. Now with at least a partial large bowel obstruction at the level of his colonic stent with proximal colon and small bowel dilation. Urgent/emergent surgical intervention, especially given no evidence of peritonitis or perforation is suboptimal given recent Avastin dosing. Will manage conservatively and discuss possible re-stenting.     -admit to colorectal surgery  -NPO  -NGT decompression  -fluids  -PRN pain and nausea meds  -follow up diagnostic lap cytology  -hold home meds for now  -DVT ppx

## 2022-09-15 NOTE — ED TRIAGE NOTES
Javier Downs, a 72 y.o. male presents to the ED w/ complaint of abdominal pain. Hx of colon cancer on chemo. Patient developed abdominal pain yesterday and his oxycodone at home has not helped the pain. Associated countless vomiting episodes per patient and his wife. BMs are normal, last yesterday. Denies fever.    Triage note:  Chief Complaint   Patient presents with    Abdominal Pain     Pt reports surgery on 9/7. Pt with PMH of colon cancer, on chemo infusions. +n/v     Review of patient's allergies indicates:  No Known Allergies  Past Medical History:   Diagnosis Date    MARIA A (acute kidney injury) 8/18/2022    Hypertension     Metastatic colon cancer to liver 4/22/2021

## 2022-09-15 NOTE — NURSING
Spoke to Telma HSU in ED and informed [facility] nurse that the KUB revealed the NGT tube was in the abdomen, but its coiled.  Nurse Dinorah stated she would pull the NGT back and connect to prescribed IVF as ordered.   Paged Elsy ROQUE NP for further instructions.

## 2022-09-15 NOTE — SUBJECTIVE & OBJECTIVE
(Not in a hospital admission)      Review of patient's allergies indicates:  No Known Allergies    Past Medical History:   Diagnosis Date    MARIA A (acute kidney injury) 8/18/2022    Hypertension     Metastatic colon cancer to liver 4/22/2021     Past Surgical History:   Procedure Laterality Date    COLONOSCOPY N/A 4/20/2021    Procedure: COLONOSCOPY with possible stent;  Surgeon: EDILMA Bonilla MD;  Location: [facility] (2ND FLR);  Service: Colon and Rectal;  Laterality: N/A;    DIAGNOSTIC LAPAROSCOPY N/A 9/7/2022    Procedure: LAPAROSCOPY, DIAGNOSTIC;  Surgeon: Adrian Rodriguez MD;  Location: [facility] OR 2ND FLR;  Service: General;  Laterality: N/A;    INSERTION OF TUNNELED CENTRAL VENOUS CATHETER (CVC) WITH SUBCUTANEOUS PORT N/A 5/3/2021    Procedure: [id number]-CATH;  Surgeon: Francisco Layton MD;  Location: [facility] OR 2ND FLR;  Service: Vascular;  Laterality: N/A;     Family History    None       Tobacco Use    Smoking status: Never    Smokeless tobacco: Never   Substance and Sexual Activity    Alcohol use: Yes    Drug use: Not on file    Sexual activity: Not on file     Review of Systems   Constitutional:  Negative for chills and fever.   HENT:  Negative for sore throat.    Eyes:  Negative for redness.   Respiratory:  Negative for shortness of breath.    Cardiovascular:  Negative for chest pain.   Gastrointestinal:  Positive for abdominal pain, constipation, nausea and vomiting.        Obstipation.    Endocrine: Negative for polyuria.   Genitourinary:  Negative for dysuria.   Musculoskeletal:  Negative for back pain.   Skin:  Negative for rash.   Neurological:  Negative for headaches.   Psychiatric/Behavioral:  Negative for agitation.    Objective:     Vital Signs (Most Recent):  Temp: 97.6 °F (36.4 °C) (09/15/22 0632)  Pulse: 97 (09/15/22 0452)  Resp: 16 (09/15/22 0713)  BP: 139/89 (09/15/22 0435)  SpO2: 97 % (09/15/22 0452) Vital Signs (24h Range):  Temp:  [97.6 °F (36.4 °C)-97.9 °F (36.6 °C)] 97.6 °F (36.4  °C)  Pulse:  [] 97  Resp:  [16-20] 16  SpO2:  [97 %-100 %] 97 %  BP: (134-139)/(83-89) 139/89     Weight: 54.9 kg (121 lb)  Body mass index is 18.95 kg/m².    Physical Exam  Vitals and nursing note reviewed.   HENT:      Head: Normocephalic and atraumatic.      Right Ear: External ear normal.      Left Ear: External ear normal.      Nose:      Comments: NG tube in place.      Mouth/Throat:      Mouth: Mucous membranes are moist.   Eyes:      Conjunctiva/sclera: Conjunctivae normal.   Cardiovascular:      Rate and Rhythm: Normal rate.   Pulmonary:      Effort: Pulmonary effort is normal. No respiratory distress.   Abdominal:      General: There is distension.      Tenderness: There is abdominal tenderness. There is no guarding or rebound.      Comments: Surgical incisions from recent diagnostic lap clean, dry, and intact. Mild to moderate tenderness in the periumbilical region. Moderate distension. Tympanitic to percussion. No evidence of port site hernias from previous operation.    Musculoskeletal:         General: Normal range of motion.      Cervical back: Normal range of motion.   Skin:     General: Skin is warm and dry.   Neurological:      General: No focal deficit present.      Mental Status: He is alert.   Psychiatric:         Mood and Affect: Mood normal.         Behavior: Behavior normal.       Significant Labs:  CBC:   Recent Labs   Lab 09/15/22  0431   WBC 5.05   RBC 4.89   HGB 14.5   HCT 44.9      MCV 92   MCH 29.7   MCHC 32.3     CMP:   Recent Labs   Lab 09/15/22  0431   *   CALCIUM 9.8   ALBUMIN 3.3*   PROT 8.4   *   K 4.8   CO2 23   CL 99   BUN 22   CREATININE 1.0   ALKPHOS 84   ALT 10   AST 22   BILITOT 1.3*     CRP: No results for input(s): CRP in the last 168 hours.    Significant Diagnostics:  CT reviewed. At least partially obstructed at transverse colon stent with proximal colon dilation as well as some small bowel dilation. Minimal to no ascites.     Pending cytology  from brushings.

## 2022-09-15 NOTE — ED NOTES
Bladder scanned patient revealing 164 and 174mL (on two scans) in his bladder. Patient is willing to try and provide a sample at this time.

## 2022-09-15 NOTE — PLAN OF CARE
Problem: Adult Inpatient Plan of Care  Goal: Plan of Care Review  Outcome: Ongoing, Progressing     Problem: Adult Inpatient Plan of Care  Goal: Patient-Specific Goal (Individualized)  Outcome: Ongoing, Progressing     Problem: Adult Inpatient Plan of Care  Goal: Optimal Comfort and Wellbeing  Outcome: Ongoing, Progressing     Problem: Fluid and Electrolyte Imbalance (Acute Kidney Injury/Impairment)  Goal: Fluid and Electrolyte Balance  Outcome: Ongoing, Progressing     Problem: Oral Intake Inadequate (Acute Kidney Injury/Impairment)  Goal: Optimal Nutrition Intake  Outcome: Ongoing, Progressing     Problem: Pain Acute  Goal: Acceptable Pain Control and Functional Ability  Outcome: Ongoing, Progressing       Patient AAO x 3, pleasant and cooperative.  Patient reported pain and medicated with morphine 2 mg for pain rates 7-8/10. Patient reported relief.  L AC 20 g with NS infusing at 125 cc/hr.  L nare NGT to LWS,  L nare NGT retracted earlier at 0900 after KUB completed.    LR bolus completed as ordered.  Voids per urinal with adequate output.  R IJ power port, de accessed.  CBC& CMP scheduled for 9/16.  POC reviewed with patient and family and discharge date is TBD.

## 2022-09-15 NOTE — ED PROVIDER NOTES
Source of History:  Patient  Chart    Chief complaint:  Abdominal Pain (Pt reports surgery on 9/7. Pt with PMH of colon cancer, on chemo infusions. +n/v)      HPI:  Javier Downs is a 72 y.o. male with history of colon cancer on chemotherapy with liver metastasis, hypertension presenting to emergency department with complaint of abdominal pain, nausea or vomiting.  Patient states symptoms began yesterday.  He has abdominal distension.  He has had no fevers but does have chills.  Every time he tries to eat, he vomits.  Vomit is nonbloody and nonbilious.  He still is nauseous.  He is passing gas.  Normal bowel movements, no diarrhea.  No dysuria or hematuria.  No history of bowel obstructions.    Patient underwent diagnostic laparoscopy with peritoneal washings on 09/07 with Dr. Rodriguez.  Discharge successfully with no complications.  Patient also previously had a transverse colon stent placed with colorectal surgery, Dr. Stockton.    ROS: As per HPI and below:  Review of Systems   Constitutional:  Negative for fever.   HENT:  Negative for sore throat.    Eyes:  Negative for double vision.   Respiratory:  Negative for cough and shortness of breath.    Cardiovascular:  Negative for chest pain.   Gastrointestinal:  Positive for abdominal pain, nausea and vomiting.   Genitourinary:  Negative for dysuria.   Musculoskeletal:  Negative for falls.   Skin:  Negative for rash.   Neurological:  Negative for headaches.     Review of patient's allergies indicates:  No Known Allergies    Current Facility-Administered Medications on File Prior to Encounter   Medication Dose Route Frequency Provider Last Rate Last Admin    sars-cov-2 (covid-19) 30 mcg/0.3 ml injection 0.3 mL  0.3 mL Intramuscular 1 time in Clinic/HOD Idania Rock LPN         Current Outpatient Medications on File Prior to Encounter   Medication Sig Dispense Refill    acetaminophen (TYLENOL) 500 MG tablet Take 1 tablet (500 mg total) by mouth every 6 (six) hours as  needed for Pain (alternate with ibuprofen).  0    amLODIPine (NORVASC) 10 MG tablet Take 1 tablet (10 mg total) by mouth once daily. 90 tablet 3    ibuprofen (ADVIL,MOTRIN) 400 MG tablet Take 1 tablet (400 mg total) by mouth every 6 (six) hours as needed for Other (pain). Alternate with tylenol      LIDOcaine-prilocaine (EMLA) cream Apply topically as needed (Apply one hour before treatment). 30 g 5    ondansetron (ZOFRAN) 4 MG tablet Take 1 tablet (4 mg total) by mouth every 8 (eight) hours as needed for Nausea. 30 tablet 3    oxyCODONE (ROXICODONE) 5 MG immediate release tablet Take 1 tablet (5 mg total) by mouth every 6 (six) hours as needed for Pain. 5 tablet 0       PMH:  As per HPI and below:  Past Medical History:   Diagnosis Date    MARIA A (acute kidney injury) 8/18/2022    Hypertension     Metastatic colon cancer to liver 4/22/2021     Past Surgical History:   Procedure Laterality Date    COLONOSCOPY N/A 4/20/2021    Procedure: COLONOSCOPY with possible stent;  Surgeon: EDILMA Bonilla MD;  Location: [facility] ([address] [place] [address]);  Service: Colon and Rectal;  Laterality: N/A;    DIAGNOSTIC LAPAROSCOPY N/A 9/7/2022    Procedure: LAPAROSCOPY, DIAGNOSTIC;  Surgeon: Adrian Rodriguez MD;  Location: [facility] OR [address] [place] [address];  Service: General;  Laterality: N/A;    INSERTION OF TUNNELED CENTRAL VENOUS CATHETER (CVC) WITH SUBCUTANEOUS PORT N/A 5/3/2021    Procedure: AZTVMLEIO-FIBU-F-CATH;  Surgeon: Francisco Layton MD;  Location: [facility] OR [address] [place] [address];  Service: Vascular;  Laterality: N/A;       Social History     Socioeconomic History    Marital status:    Tobacco Use    Smoking status: Never    Smokeless tobacco: Never   Substance and Sexual Activity    Alcohol use: Yes       No family history on file.    Physical Exam:      Vitals:    09/15/22 0452   BP:    Pulse: 97   Resp:    Temp:      Gen:  Uncomfortable, nontoxic.  Not actively vomiting.  Mental Status:  Alert and oriented .  Appropriate, conversant.  Skin: Warm, dry. No  "rashes seen.  Eyes: No conjunctival injection.  Pulm: CTAB. No increased work of breathing.  No significant tachypnea.  No audible stridor or wheezing.  No conversational dyspnea.    CV: Regular rate. Regular rhythm.   Abd:  Firm, distended.  Diffuse tenderness to palpation.  Voluntary guarding is present.  MSK: Good range of motion all joints.  No deformities.    Neuro: Awake. Speech normal. No focal neuro deficit observed.    Laboratory Studies:  Labs Reviewed   CBC W/ AUTO DIFFERENTIAL - Abnormal; Notable for the following components:       Result Value    RDW 14.6 (*)     Lymph # 0.7 (*)     Lymph % 13.3 (*)     Mono % 15.2 (*)     All other components within normal limits   COMPREHENSIVE METABOLIC PANEL - Abnormal; Notable for the following components:    Sodium 131 (*)     Glucose 153 (*)     Albumin 3.3 (*)     Total Bilirubin 1.3 (*)     All other components within normal limits   CULTURE, BLOOD   CULTURE, BLOOD   LIPASE   LACTIC ACID, PLASMA   URINALYSIS, REFLEX TO URINE CULTURE   ISTAT CHEM8     X-rays (independently interpreted by me):  No pneumoperitoneum    Chart reviewed.  See summary in HPI    Imaging Results              CT Abdomen Pelvis With Contrast (In process)  Result time 09/15/22 05:55:51                     X-Ray Chest 1 View (Final result)  Result time 09/15/22 04:49:17      Final result by Pito Archer MD (09/15/22 04:49:17)                   Impression:      No acute cardiopulmonary finding identified on this single view.      Electronically signed by: Pito Archer MD  Date:    09/15/2022  Time:    04:49               Narrative:    EXAMINATION:  XR CHEST 1 VIEW    CLINICAL HISTORY:  Provided history is "  Peritonitis, unspecified".    TECHNIQUE:  One view of the chest.    COMPARISON:  05/03/2021.    FINDINGS:  Cardiac wires overlie the chest.  Cardiomediastinal silhouette is not enlarged.  Right-sided Port-A-Cath is present with the tip overlying the brachiocephalic/SVC junction.  " Lungs are essentially clear without focal consolidation.  No sizable pleural effusion.  No pneumothorax.  There is a partially visualized stent overlying the upper abdomen near the midline.  No obvious pneumoperitoneum in the upper abdomen though CT would provide improved sensitivity in this patient with concern for peritonitis.                                      Medications Given:  Medications   HYDROmorphone injection 0.5 mg (has no administration in time range)   ondansetron injection 4 mg (4 mg Intravenous Given 9/15/22 3866)   HYDROmorphone injection 0.5 mg (0.5 mg Intravenous Given 9/15/22 1585)   iohexoL (OMNIPAQUE 350) injection 75 mL (75 mLs Intravenous Given 9/15/22 5676)       Discussed with:  Colorectal surgery    MDM:    72 y.o. male with history of colon cancer with transverse colon stent placed by Colorectal surgery presenting to emergency department with complaint of abdominal pain and vomiting.  Afebrile, tachycardic, normotensive, ill-appearing.  Not actively vomiting.  Abdomen is firm, with guarding concerning for peritonitis.  Chest x-ray without pneumoperitoneum.  Labs are reassuring.  He received Zofran and Dilaudid.    CT of the abdomen and pelvis demonstrating small-bowel obstruction.  Will place NG tube.  Case discussed with colorectal surgery, plan admission to their service.    Diagnostic Impression:    1. Small bowel obstruction    2. Peritonitis         ED Disposition Condition    Admit Stable               Patient and family understand the plan and is in agreement, verbalized understanding, questions answered    Kaylyn Garcia MD  Emergency Medicine         Kaylyn Garcia MD  09/15/22 6215

## 2022-09-15 NOTE — HPI
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 His colon cancer was nearly obstructive and was stented 04/20/21 with Dr. Bonilla. He also recently underwent a diagnostic lap with washings to rule out carcinomatosis in preparation for a formal liver resection 09/07/22 with Dr. BUD Rodriguez. He states he was recovering well but started to develop moderate to severe jaya-umbilical pain 24 hours ago associated with nausea, vomiting, and obstipation. He notes he was initially passing a lot of flatus but that has since stopped. Denies fevers, chills, chest pain, or any difficulty breathing.

## 2022-09-15 NOTE — CONSULTS
[facility] - Emergency Dept  Colorectal Surgery  Consult Note    Patient Name: Javier Downs  MRN: [id number]  Admission Date: 9/15/2022  Hospital Length of Stay: 0 days  Attending Physician: Kaylyn Garcia MD  Primary Care Provider: Renetta García MD    Inpatient consult to Colorectal Surgery  Consult performed by: Jose Gore MD  Consult ordered by: Jose Gore MD        Subjective:     Chief Complaint/Reason for Admission: abdominal pain    History of Present Illness:  72-year-old male with multiple medical co-morbidities including hypertension who is currently undergoing chemotherapy (multiple cycles of FOLFOX and Avastin, last Avastin 08/18/22) for a 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 His colon cancer was nearly obstructive and was stented 04/20/21 with Dr. Bonilla. He also recently underwent a diagnostic lap with washings to rule out carcinomatosis in preparation for a formal liver resection 09/07/22 with Dr. BUD Rodriguez. He states he was recovering well but started to develop moderate to severe jaya-umbilical pain 24 hours ago associated with nausea, vomiting, and obstipation. He notes he was initially passing a lot of flatus but that has since stopped. Denies fevers, chills, chest pain, or any difficulty breathing.       Review of patient's allergies indicates:  No Known Allergies    Past Medical History:   Diagnosis Date    MARIA A (acute kidney injury) 8/18/2022    Hypertension     Metastatic colon cancer to liver 4/22/2021     Past Surgical History:   Procedure Laterality Date    COLONOSCOPY N/A 4/20/2021    Procedure: COLONOSCOPY with possible stent;  Surgeon: EDILMA Bonilla MD;  [facility]  Service: Colon and Rectal;  Laterality: N/A;    DIAGNOSTIC LAPAROSCOPY N/A 9/7/2022    Procedure: LAPAROSCOPY, DIAGNOSTIC;  Surgeon: Adrian Rodriguez MD;  [facility];  Service: General;  Laterality: N/A;    INSERTION OF TUNNELED CENTRAL VENOUS  CATHETER (CVC) WITH SUBCUTANEOUS PORT N/A 5/3/2021    Procedure: BYHEGWGXP-MJGH-K-CATH;  Surgeon: Francisco Layton MD;  Location: [facility] OR [address] [place] [address];  Service: Vascular;  Laterality: N/A;     Family History    None       Tobacco Use    Smoking status: Never    Smokeless tobacco: Never   Substance and Sexual Activity    Alcohol use: Yes    Drug use: Not on file    Sexual activity: Not on file     Review of Systems   Constitutional:  Negative for chills and fever.   HENT:  Negative for sore throat.    Eyes:  Negative for redness.   Respiratory:  Negative for shortness of breath.    Cardiovascular:  Negative for chest pain.   Gastrointestinal:  Positive for abdominal pain, constipation, nausea and vomiting.        Obstipation.    Endocrine: Negative for polyuria.   Genitourinary:  Negative for dysuria.   Musculoskeletal:  Negative for back pain.   Skin:  Negative for rash.   Neurological:  Negative for headaches.   Psychiatric/Behavioral:  Negative for agitation.    Objective:     Vital Signs (Most Recent):  Temp: 97.6 °F (36.4 °C) (09/15/22 0632)  Pulse: 97 (09/15/22 0452)  Resp: 16 (09/15/22 0713)  BP: 139/89 (09/15/22 0435)  SpO2: 97 % (09/15/22 0452) Vital Signs (24h Range):  Temp:  [97.6 °F (36.4 °C)-97.9 °F (36.6 °C)] 97.6 °F (36.4 °C)  Pulse:  [] 97  Resp:  [16-20] 16  SpO2:  [97 %-100 %] 97 %  BP: (134-139)/(83-89) 139/89     Weight: 54.9 kg (121 lb)  Body mass index is 18.95 kg/m².    Physical Exam  Vitals and nursing note reviewed.   HENT:      Head: Normocephalic and atraumatic.      Right Ear: External ear normal.      Left Ear: External ear normal.      Nose:      Comments: NG tube in place.      Mouth/Throat:      Mouth: Mucous membranes are moist.   Eyes:      Conjunctiva/sclera: Conjunctivae normal.   Cardiovascular:      Rate and Rhythm: Normal rate.   Pulmonary:      Effort: Pulmonary effort is normal. No respiratory distress.   Abdominal:      General: There is distension.      Tenderness:  There is abdominal tenderness. There is no guarding or rebound.      Comments: Surgical incisions from recent diagnostic lap clean, dry, and intact. Mild to moderate tenderness in the periumbilical region. Moderate distension. Tympanitic to percussion. No evidence of port site hernias from previous operation.    Musculoskeletal:         General: Normal range of motion.      Cervical back: Normal range of motion.   Skin:     General: Skin is warm and dry.   Neurological:      General: No focal deficit present.      Mental Status: He is alert.   Psychiatric:         Mood and Affect: Mood normal.         Behavior: Behavior normal.       Significant Labs:  CBC:   Recent Labs   Lab 09/15/22  0431   WBC 5.05   RBC 4.89   HGB 14.5   HCT 44.9      MCV 92   MCH 29.7   MCHC 32.3     CMP:   Recent Labs   Lab 09/15/22  0431   *   CALCIUM 9.8   ALBUMIN 3.3*   PROT 8.4   *   K 4.8   CO2 23   CL 99   BUN 22   CREATININE 1.0   ALKPHOS 84   ALT 10   AST 22   BILITOT 1.3*     CRP: No results for input(s): CRP in the last 168 hours.    Significant Diagnostics:  CT reviewed. At least partially obstructed at transverse colon stent with proximal colon dilation as well as some small bowel dilation. Minimal to no ascites.     Pending cytology from brushings.     Assessment/Plan:     Metastatic colon cancer to liver  72-year-old male currently being treated for metastatic colon cancer to the liver. Has been on a combination of Avastin and Folfox (last 08/18/22) and received a transverse colon stent 04/2021 as well as a diagnostic lap to rule out carcinomatosis 09/07/22. Now with at least a partial large bowel obstruction at the level of his colonic stent with proximal colon and small bowel dilation. Urgent/emergent surgical intervention, especially given no evidence of peritonitis or perforation is suboptimal given recent Avastin dosing. Will manage conservatively and discuss possible re-stenting.     -admit to  colorectal surgery  -NPO  -NGT decompression  -fluids  -PRN pain and nausea meds  -follow up diagnostic lap cytology  -hold home meds for now  -DVT ppx        Thank you for your consult. I will follow-up with patient. Please contact us if you have any additional questions.    Jose Gore MD  Colorectal Surgery  [facility] - Emergency Dept

## 2022-09-15 NOTE — ED NOTES
First and second attempt to call report unsuccessful, RN unavailable both attempts. Call back number left with unit secretary. Will attempt again in 5-10 min.

## 2022-09-16 ENCOUNTER — TELEPHONE (OUTPATIENT)
Dept: HEMATOLOGY/ONCOLOGY | Facility: CLINIC | Age: 72
End: 2022-09-16
Payer: MEDICARE

## 2022-09-16 ENCOUNTER — TELEPHONE (OUTPATIENT)
Dept: SURGERY | Facility: CLINIC | Age: 72
End: 2022-09-16
Payer: MEDICARE

## 2022-09-16 LAB
ALBUMIN SERPL BCP-MCNC: 2.6 G/DL (ref 3.5–5.2)
ALP SERPL-CCNC: 69 U/L (ref 55–135)
ALT SERPL W/O P-5'-P-CCNC: 6 U/L (ref 10–44)
ANION GAP SERPL CALC-SCNC: 8 MMOL/L (ref 8–16)
AST SERPL-CCNC: 14 U/L (ref 10–40)
BASOPHILS # BLD AUTO: 0.03 K/UL (ref 0–0.2)
BASOPHILS NFR BLD: 0.6 % (ref 0–1.9)
BILIRUB SERPL-MCNC: 1 MG/DL (ref 0.1–1)
BUN SERPL-MCNC: 17 MG/DL (ref 8–23)
CALCIUM SERPL-MCNC: 8.9 MG/DL (ref 8.7–10.5)
CHLORIDE SERPL-SCNC: 106 MMOL/L (ref 95–110)
CO2 SERPL-SCNC: 26 MMOL/L (ref 23–29)
COMMENT: NORMAL
CREAT SERPL-MCNC: 0.9 MG/DL (ref 0.5–1.4)
DIFFERENTIAL METHOD: ABNORMAL
EOSINOPHIL # BLD AUTO: 0 K/UL (ref 0–0.5)
EOSINOPHIL NFR BLD: 0.4 % (ref 0–8)
ERYTHROCYTE [DISTWIDTH] IN BLOOD BY AUTOMATED COUNT: 14.9 % (ref 11.5–14.5)
EST. GFR  (NO RACE VARIABLE): >60 ML/MIN/1.73 M^2
FINAL PATHOLOGIC DIAGNOSIS: NORMAL
GLUCOSE SERPL-MCNC: 104 MG/DL (ref 70–110)
HCT VFR BLD AUTO: 37.3 % (ref 40–54)
HGB BLD-MCNC: 12.1 G/DL (ref 14–18)
IMM GRANULOCYTES # BLD AUTO: 0 K/UL (ref 0–0.04)
IMM GRANULOCYTES NFR BLD AUTO: 0 % (ref 0–0.5)
LYMPHOCYTES # BLD AUTO: 1.5 K/UL (ref 1–4.8)
LYMPHOCYTES NFR BLD: 30 % (ref 18–48)
Lab: NORMAL
MCH RBC QN AUTO: 28.9 PG (ref 27–31)
MCHC RBC AUTO-ENTMCNC: 32.4 G/DL (ref 32–36)
MCV RBC AUTO: 89 FL (ref 82–98)
MICROSCOPIC EXAM: NORMAL
MONOCYTES # BLD AUTO: 1.3 K/UL (ref 0.3–1)
MONOCYTES NFR BLD: 27.6 % (ref 4–15)
NEUTROPHILS # BLD AUTO: 2 K/UL (ref 1.8–7.7)
NEUTROPHILS NFR BLD: 41.4 % (ref 38–73)
NRBC BLD-RTO: 0 /100 WBC
PLATELET # BLD AUTO: 276 K/UL (ref 150–450)
PMV BLD AUTO: 10.1 FL (ref 9.2–12.9)
POTASSIUM SERPL-SCNC: 4.7 MMOL/L (ref 3.5–5.1)
PROT SERPL-MCNC: 6.6 G/DL (ref 6–8.4)
RBC # BLD AUTO: 4.19 M/UL (ref 4.6–6.2)
SODIUM SERPL-SCNC: 140 MMOL/L (ref 136–145)
WBC # BLD AUTO: 4.86 K/UL (ref 3.9–12.7)

## 2022-09-16 PROCEDURE — 85025 COMPLETE CBC W/AUTO DIFF WBC: CPT | Performed by: STUDENT IN AN ORGANIZED HEALTH CARE EDUCATION/TRAINING PROGRAM

## 2022-09-16 PROCEDURE — 36415 COLL VENOUS BLD VENIPUNCTURE: CPT | Performed by: STUDENT IN AN ORGANIZED HEALTH CARE EDUCATION/TRAINING PROGRAM

## 2022-09-16 PROCEDURE — 20600001 HC STEP DOWN PRIVATE ROOM

## 2022-09-16 PROCEDURE — 25000003 PHARM REV CODE 250: Performed by: STUDENT IN AN ORGANIZED HEALTH CARE EDUCATION/TRAINING PROGRAM

## 2022-09-16 PROCEDURE — 63600175 PHARM REV CODE 636 W HCPCS: Performed by: STUDENT IN AN ORGANIZED HEALTH CARE EDUCATION/TRAINING PROGRAM

## 2022-09-16 PROCEDURE — 80053 COMPREHEN METABOLIC PANEL: CPT | Performed by: STUDENT IN AN ORGANIZED HEALTH CARE EDUCATION/TRAINING PROGRAM

## 2022-09-16 PROCEDURE — 99233 PR SUBSEQUENT HOSPITAL CARE,LEVL III: ICD-10-PCS | Mod: ,,, | Performed by: COLON & RECTAL SURGERY

## 2022-09-16 PROCEDURE — 99233 SBSQ HOSP IP/OBS HIGH 50: CPT | Mod: ,,, | Performed by: COLON & RECTAL SURGERY

## 2022-09-16 RX ADMIN — SODIUM CHLORIDE: 0.9 INJECTION, SOLUTION INTRAVENOUS at 03:09

## 2022-09-16 RX ADMIN — ENOXAPARIN SODIUM 40 MG: 100 INJECTION SUBCUTANEOUS at 04:09

## 2022-09-16 NOTE — PLAN OF CARE
[facility]  Initial Discharge Assessment       Primary Care Provider: Renetta García MD    Admission Diagnosis: Small bowel obstruction [K56.609]  Peritonitis [K65.9]  Encounter for nasogastric (NG) tube placement [Z46.59]    Admission Date: 9/15/2022  Expected Discharge Date: 9/19/2022    Discharge Barriers Identified: None    Payor: MEDICARE / Plan: MEDICARE PART A & B / Product Type: Government /     Extended Emergency Contact Information  Primary Emergency Contact: Carrie Downs  Mobile Phone: [phone]  Relation: Daughter    Discharge Plan A: Home  Discharge Plan B: Home Health      [facility]  [address] [address]  Phone: [phone] Fax: [phone]    [facility]  [address]  [place] [address]  Phone: [phone] Fax: [phone]      Initial Assessment (most recent)       Adult Discharge Assessment - 09/16/22 1035          Discharge Assessment    Assessment Type Discharge Planning Assessment     Confirmed/corrected address, phone number and insurance Yes     Confirmed Demographics Correct on Facesheet     Source of Information patient     Communicated JOSEFA with patient/caregiver Yes     Lives With spouse     Do you expect to return to your current living situation? Yes     Do you have help at home or someone to help you manage your care at home? Yes     Prior to hospitilization cognitive status: Alert/Oriented     Current cognitive status: Alert/Oriented     Walking or Climbing Stairs Difficulty none     Dressing/Bathing Difficulty none     Equipment Currently Used at Home none     Readmission within 30 days? No     Do you currently have service(s) that help you manage your care at home? No     Do you take prescription medications? Yes     Do you have prescription coverage? Yes     Do you have any problems affording any of your prescribed medications? No     Is the patient taking medications as prescribed? yes     Who is  going to help you get home at discharge? son     Are you on dialysis? No     Do you take coumadin? No     Discharge Plan A Home     Discharge Plan B Home Health     DME Needed Upon Discharge  none     Discharge Plan discussed with: Patient     Discharge Barriers Identified None                      Azeb Aburto RN, CM   Ext: [phone]

## 2022-09-16 NOTE — PLAN OF CARE
POC reviewed with patient and family.   NGT clamp trial today. Pt has no complaints of nausea or vomiting. Passing stool. Voiding appropriately.   No complaints of pain.   No concerns at this time. WCTM.   Problem: Adult Inpatient Plan of Care  Goal: Plan of Care Review  Outcome: Ongoing, Progressing  Goal: Patient-Specific Goal (Individualized)  Outcome: Ongoing, Progressing  Goal: Absence of Hospital-Acquired Illness or Injury  Outcome: Ongoing, Progressing  Goal: Optimal Comfort and Wellbeing  Outcome: Ongoing, Progressing  Goal: Readiness for Transition of Care  Outcome: Ongoing, Progressing     Problem: Fluid and Electrolyte Imbalance (Acute Kidney Injury/Impairment)  Goal: Fluid and Electrolyte Balance  Outcome: Ongoing, Progressing     Problem: Oral Intake Inadequate (Acute Kidney Injury/Impairment)  Goal: Optimal Nutrition Intake  Outcome: Ongoing, Progressing     Problem: Renal Function Impairment (Acute Kidney Injury/Impairment)  Goal: Effective Renal Function  Outcome: Ongoing, Progressing     Problem: Pain Acute  Goal: Acceptable Pain Control and Functional Ability  Outcome: Ongoing, Progressing

## 2022-09-16 NOTE — PROGRESS NOTES
[facility]  Colorectal Surgery  Progress Note    Patient Name: Javier Downs  MRN: [id number]  Admission Date: 9/15/2022  Hospital Length of Stay: 1 days  Attending Physician: EDILMA Bonilla MD    Subjective:     Interval History: Had 4 bowel movements overnight. Pain completely resolved. NGT output minimal    Post-Op Info:  * No surgery found *          Medications:  Continuous Infusions:   sodium chloride 0.9% 125 mL/hr at 09/16/22 0337     Scheduled Meds:   enoxaparin  40 mg Subcutaneous Daily     PRN Meds:   morphine    morphine    naloxone    ondansetron    prochlorperazine    sodium chloride 0.9%        Objective:     Vital Signs (Most Recent):  Temp: 98.9 °F (37.2 °C) (09/16/22 0734)  Pulse: 87 (09/16/22 0734)  Resp: 18 (09/16/22 0734)  BP: 132/80 (09/16/22 0734)  SpO2: (!) 94 % (09/16/22 0734)   Vital Signs (24h Range):  Temp:  [98 °F (36.7 °C)-99 °F (37.2 °C)] 98.9 °F (37.2 °C)  Pulse:  [] 87  Resp:  [16-19] 18  SpO2:  [94 %-99 %] 94 %  BP: (124-136)/(76-87) 132/80     Intake/Output - Last 3 Shifts         09/14 0700  09/15 0659 09/15 0700  09/16 0659 09/16 0700  09/17 0659    P.O.  0     Total Intake(mL/kg)  0 (0)     Urine (mL/kg/hr)  1500 (1.1)     Stool  0     Total Output  1500     Net  -1500            Stool Occurrence  0 x             Physical Exam  HENT:      Head: Normocephalic.      Nose: Nose normal.      Mouth/Throat:      Mouth: Mucous membranes are moist.   Eyes:      Extraocular Movements: Extraocular movements intact.   Cardiovascular:      Rate and Rhythm: Normal rate.   Pulmonary:      Effort: Pulmonary effort is normal.   Abdominal:      General: Abdomen is flat.      Palpations: Abdomen is soft.   Musculoskeletal:         General: Normal range of motion.      Cervical back: Normal range of motion.   Skin:     General: Skin is warm.   Neurological:      General: No focal deficit present.      Mental Status: He is alert.   Psychiatric:         Mood and Affect: Mood  normal.         Significant Labs:  BMP (Last 3 Results):   Recent Labs   Lab 09/14/22  1314 09/15/22  0431 09/16/22  0547   GLU 95 153* 104    131* 140   K 3.8 4.8 4.7    99 106   CO2 26 23 26   BUN 10 22 17   CREATININE 0.9 1.0 0.9   CALCIUM 9.4 9.8 8.9     CBC (Last 3 Results):   Recent Labs   Lab 09/14/22  1314 09/15/22  0431 09/16/22  0547   WBC 7.56 5.05 4.86   RBC 4.77 4.89 4.19*   HGB 14.1 14.5 12.1*   HCT 42.9 44.9 37.3*    345 276   MCV 90 92 89   MCH 29.6 29.7 28.9   MCHC 32.9 32.3 32.4       Significant Diagnostics:  None    Assessment/Plan:     Metastatic colon cancer to liver  72-year-old male currently being treated for metastatic colon cancer to the liver. Has been on a combination of Avastin and Folfox (last 08/18/22) and received a transverse colon stent 04/2021 as well as a diagnostic lap to rule out carcinomatosis 09/07/22. Presented with at least a partial large bowel obstruction at the level of his colonic stent with proximal colon and small bowel dilation.     Has seen marked improvement with return of bowel function    - KUB to assess for ongoing radiographic signs of obstruction  - NGT clamp trial  -fluids  -PRN pain and nausea meds  -follow up diagnostic lap cytology  -hold home meds for now  -DVT ppx          SUKUMAR GUTHRIE MD  Colorectal Surgery  [facility]

## 2022-09-16 NOTE — SUBJECTIVE & OBJECTIVE
Subjective:     Interval History: Had 4 bowel movements overnight. Pain completely resolved. NGT output minimal    Post-Op Info:  * No surgery found *          Medications:  Continuous Infusions:   sodium chloride 0.9% 125 mL/hr at 09/16/22 0337     Scheduled Meds:   enoxaparin  40 mg Subcutaneous Daily     PRN Meds:   morphine    morphine    naloxone    ondansetron    prochlorperazine    sodium chloride 0.9%        Objective:     Vital Signs (Most Recent):  Temp: 98.9 °F (37.2 °C) (09/16/22 0734)  Pulse: 87 (09/16/22 0734)  Resp: 18 (09/16/22 0734)  BP: 132/80 (09/16/22 0734)  SpO2: (!) 94 % (09/16/22 0734)   Vital Signs (24h Range):  Temp:  [98 °F (36.7 °C)-99 °F (37.2 °C)] 98.9 °F (37.2 °C)  Pulse:  [] 87  Resp:  [16-19] 18  SpO2:  [94 %-99 %] 94 %  BP: (124-136)/(76-87) 132/80     Intake/Output - Last 3 Shifts         09/14 0700  09/15 0659 09/15 0700  09/16 0659 09/16 0700 09/17 0659    P.O.  0     Total Intake(mL/kg)  0 (0)     Urine (mL/kg/hr)  1500 (1.1)     Stool  0     Total Output  1500     Net  -1500            Stool Occurrence  0 x             Physical Exam  HENT:      Head: Normocephalic.      Nose: Nose normal.      Mouth/Throat:      Mouth: Mucous membranes are moist.   Eyes:      Extraocular Movements: Extraocular movements intact.   Cardiovascular:      Rate and Rhythm: Normal rate.   Pulmonary:      Effort: Pulmonary effort is normal.   Abdominal:      General: Abdomen is flat.      Palpations: Abdomen is soft.   Musculoskeletal:         General: Normal range of motion.      Cervical back: Normal range of motion.   Skin:     General: Skin is warm.   Neurological:      General: No focal deficit present.      Mental Status: He is alert.   Psychiatric:         Mood and Affect: Mood normal.         Significant Labs:  BMP (Last 3 Results):   Recent Labs   Lab 09/14/22  1314 09/15/22  0431 09/16/22  0547   GLU 95 153* 104    131* 140   K 3.8 4.8 4.7    99 106   CO2 26 23 26   BUN 10  22 17   CREATININE 0.9 1.0 0.9   CALCIUM 9.4 9.8 8.9     CBC (Last 3 Results):   Recent Labs   Lab 09/14/22  1314 09/15/22  0431 09/16/22  0547   WBC 7.56 5.05 4.86   RBC 4.77 4.89 4.19*   HGB 14.1 14.5 12.1*   HCT 42.9 44.9 37.3*    345 276   MCV 90 92 89   MCH 29.6 29.7 28.9   MCHC 32.9 32.3 32.4       Significant Diagnostics:  None

## 2022-09-16 NOTE — TELEPHONE ENCOUNTER
Attempted to reach [facility]. No answer, no voicemail     Will update provider of patient's admission

## 2022-09-16 NOTE — PLAN OF CARE
Pt Aox4, VS remained stable throughout shift, no PRN meds needed. Pt uses urinal and BSCC with assistance. Educated pt and spouse on POC, resting comfortably, bed low and locked, call light within reach, will continue to monitor.     Problem: Adult Inpatient Plan of Care  Goal: Plan of Care Review  Outcome: Ongoing, Progressing     Problem: Adult Inpatient Plan of Care  Goal: Patient-Specific Goal (Individualized)  Outcome: Ongoing, Progressing     Problem: Adult Inpatient Plan of Care  Goal: Absence of Hospital-Acquired Illness or Injury  Outcome: Ongoing, Progressing     Problem: Adult Inpatient Plan of Care  Goal: Optimal Comfort and Wellbeing  Outcome: Ongoing, Progressing     Problem: Adult Inpatient Plan of Care  Goal: Readiness for Transition of Care  Outcome: Ongoing, Progressing     Problem: Fluid and Electrolyte Imbalance (Acute Kidney Injury/Impairment)  Goal: Fluid and Electrolyte Balance  Outcome: Ongoing, Progressing     Problem: Oral Intake Inadequate (Acute Kidney Injury/Impairment)  Goal: Optimal Nutrition Intake  Outcome: Ongoing, Progressing     Problem: Renal Function Impairment (Acute Kidney Injury/Impairment)  Goal: Effective Renal Function  Outcome: Ongoing, Progressing     Problem: Pain Acute  Goal: Acceptable Pain Control and Functional Ability  Outcome: Ongoing, Progressing

## 2022-09-16 NOTE — ASSESSMENT & PLAN NOTE
72-year-old male currently being treated for metastatic colon cancer to the liver. Has been on a combination of Avastin and Folfox (last 08/18/22) and received a transverse colon stent 04/2021 as well as a diagnostic lap to rule out carcinomatosis 09/07/22. Presented with at least a partial large bowel obstruction at the level of his colonic stent with proximal colon and small bowel dilation.     Has seen marked improvement with return of bowel function    - KUB to assess for ongoing radiographic signs of obstruction  - NGT clamp trial  -fluids  -PRN pain and nausea meds  -follow up diagnostic lap cytology  -hold home meds for now  -DVT ppx

## 2022-09-16 NOTE — TELEPHONE ENCOUNTER
----- Message from Carleen Jack sent at 9/16/2022  3:33 PM CDT -----  Contact: pt  Pt requesting a callback to speak with a nurse           Confirmed contact below:  Contact Name:Javier Downs  Phone Number: [phone]

## 2022-09-16 NOTE — TELEPHONE ENCOUNTER
"----- Message from Renee Ashraf sent at 9/16/2022  8:45 AM CDT -----  Regarding: Consult/Advisory:      Name Of Caller:   Shi (Pt's Daughter)        Contact Preference?:   [phone]      What is the nature of the call?: Calling to speak w/ nurse. Pt's daughter wanted to update the office that the pt couldn't make their Infusion appt yesterday because they are in the hospital/ER.       "Thank you for all that you do for our patients"     "

## 2022-09-16 NOTE — TELEPHONE ENCOUNTER
Returned call. Spoke to Carrie. She indicated her father is agitated and wants to drink. Advised her since pt is admitted they need to speak to his inpatient team. Carrie verbalized understanding.

## 2022-09-17 LAB
ALBUMIN SERPL BCP-MCNC: 2.3 G/DL (ref 3.5–5.2)
ALP SERPL-CCNC: 63 U/L (ref 55–135)
ALT SERPL W/O P-5'-P-CCNC: 5 U/L (ref 10–44)
ANION GAP SERPL CALC-SCNC: 10 MMOL/L (ref 8–16)
AST SERPL-CCNC: 14 U/L (ref 10–40)
BASOPHILS # BLD AUTO: 0.05 K/UL (ref 0–0.2)
BASOPHILS NFR BLD: 1 % (ref 0–1.9)
BILIRUB SERPL-MCNC: 0.8 MG/DL (ref 0.1–1)
BUN SERPL-MCNC: 13 MG/DL (ref 8–23)
CALCIUM SERPL-MCNC: 8.1 MG/DL (ref 8.7–10.5)
CHLORIDE SERPL-SCNC: 107 MMOL/L (ref 95–110)
CO2 SERPL-SCNC: 22 MMOL/L (ref 23–29)
CREAT SERPL-MCNC: 0.8 MG/DL (ref 0.5–1.4)
DIFFERENTIAL METHOD: ABNORMAL
EOSINOPHIL # BLD AUTO: 0.1 K/UL (ref 0–0.5)
EOSINOPHIL NFR BLD: 1.7 % (ref 0–8)
ERYTHROCYTE [DISTWIDTH] IN BLOOD BY AUTOMATED COUNT: 14.8 % (ref 11.5–14.5)
EST. GFR  (NO RACE VARIABLE): >60 ML/MIN/1.73 M^2
GLUCOSE SERPL-MCNC: 63 MG/DL (ref 70–110)
HCT VFR BLD AUTO: 35.9 % (ref 40–54)
HGB BLD-MCNC: 11.4 G/DL (ref 14–18)
IMM GRANULOCYTES # BLD AUTO: 0.02 K/UL (ref 0–0.04)
IMM GRANULOCYTES NFR BLD AUTO: 0.4 % (ref 0–0.5)
LYMPHOCYTES # BLD AUTO: 1.7 K/UL (ref 1–4.8)
LYMPHOCYTES NFR BLD: 32.7 % (ref 18–48)
MCH RBC QN AUTO: 29.2 PG (ref 27–31)
MCHC RBC AUTO-ENTMCNC: 31.8 G/DL (ref 32–36)
MCV RBC AUTO: 92 FL (ref 82–98)
MONOCYTES # BLD AUTO: 1.4 K/UL (ref 0.3–1)
MONOCYTES NFR BLD: 27.3 % (ref 4–15)
NEUTROPHILS # BLD AUTO: 1.9 K/UL (ref 1.8–7.7)
NEUTROPHILS NFR BLD: 36.9 % (ref 38–73)
NRBC BLD-RTO: 0 /100 WBC
PLATELET # BLD AUTO: 222 K/UL (ref 150–450)
PMV BLD AUTO: 10.3 FL (ref 9.2–12.9)
POTASSIUM SERPL-SCNC: 3.3 MMOL/L (ref 3.5–5.1)
PROT SERPL-MCNC: 6 G/DL (ref 6–8.4)
RBC # BLD AUTO: 3.91 M/UL (ref 4.6–6.2)
SODIUM SERPL-SCNC: 139 MMOL/L (ref 136–145)
WBC # BLD AUTO: 5.23 K/UL (ref 3.9–12.7)

## 2022-09-17 PROCEDURE — 85025 COMPLETE CBC W/AUTO DIFF WBC: CPT | Performed by: STUDENT IN AN ORGANIZED HEALTH CARE EDUCATION/TRAINING PROGRAM

## 2022-09-17 PROCEDURE — 80053 COMPREHEN METABOLIC PANEL: CPT | Performed by: STUDENT IN AN ORGANIZED HEALTH CARE EDUCATION/TRAINING PROGRAM

## 2022-09-17 PROCEDURE — 25000003 PHARM REV CODE 250: Performed by: STUDENT IN AN ORGANIZED HEALTH CARE EDUCATION/TRAINING PROGRAM

## 2022-09-17 PROCEDURE — 63600175 PHARM REV CODE 636 W HCPCS: Performed by: STUDENT IN AN ORGANIZED HEALTH CARE EDUCATION/TRAINING PROGRAM

## 2022-09-17 PROCEDURE — 20600001 HC STEP DOWN PRIVATE ROOM

## 2022-09-17 PROCEDURE — 36415 COLL VENOUS BLD VENIPUNCTURE: CPT | Performed by: STUDENT IN AN ORGANIZED HEALTH CARE EDUCATION/TRAINING PROGRAM

## 2022-09-17 RX ORDER — AMLODIPINE BESYLATE 10 MG/1
10 TABLET ORAL DAILY
Status: DISCONTINUED | OUTPATIENT
Start: 2022-09-17 | End: 2022-09-21 | Stop reason: HOSPADM

## 2022-09-17 RX ORDER — POTASSIUM CHLORIDE 20 MEQ/1
40 TABLET, EXTENDED RELEASE ORAL ONCE
Status: COMPLETED | OUTPATIENT
Start: 2022-09-17 | End: 2022-09-17

## 2022-09-17 RX ADMIN — ENOXAPARIN SODIUM 40 MG: 100 INJECTION SUBCUTANEOUS at 04:09

## 2022-09-17 RX ADMIN — AMLODIPINE BESYLATE 10 MG: 10 TABLET ORAL at 09:09

## 2022-09-17 RX ADMIN — POTASSIUM CHLORIDE 40 MEQ: 1500 TABLET, EXTENDED RELEASE ORAL at 09:09

## 2022-09-17 RX ADMIN — SODIUM CHLORIDE: 0.9 INJECTION, SOLUTION INTRAVENOUS at 07:09

## 2022-09-17 NOTE — PROGRESS NOTES
[facility]  Colorectal Surgery  Progress Note    Patient Name: Javier Downs  MRN: [id number]  Admission Date: 9/15/2022  Hospital Length of Stay: 2 days  Attending Physician: EDILMA Bonilla MD    Subjective:     Interval History: No acute overnight events, AF, VSS. Denies any pain. NGT was discontinued. Denies any nausea or vomiting.     Post-Op Info:  * No surgery found *          Medications:  Continuous Infusions:   sodium chloride 0.9% 125 mL/hr at 09/17/22 0743     Scheduled Meds:   enoxaparin  40 mg Subcutaneous Daily    potassium chloride  40 mEq Oral Once     PRN Meds:   morphine    morphine    naloxone    ondansetron    prochlorperazine    sodium chloride 0.9%        Objective:     Vital Signs (Most Recent):  Temp: 97.6 °F (36.4 °C) (09/17/22 0744)  Pulse: 76 (09/17/22 0744)  Resp: 20 (09/17/22 0744)  BP: 128/76 (09/17/22 0744)  SpO2: 97 % (09/17/22 0744)   Vital Signs (24h Range):  Temp:  [97.4 °F (36.3 °C)-99 °F (37.2 °C)] 97.6 °F (36.4 °C)  Pulse:  [75-88] 76  Resp:  [17-20] 20  SpO2:  [95 %-98 %] 97 %  BP: (128-153)/(76-86) 128/76     Intake/Output - Last 3 Shifts         09/15 0700  09/16 0659 09/16 0700 09/17 0659 09/17 0700 09/18 0659    P.O. 0      Total Intake(mL/kg) 0 (0)      Urine (mL/kg/hr) 1500 (1.1) 200 (0.2) 850 (10.2)    Stool 0      Total Output 1500 200 850    Net -1500 -200 -850           Stool Occurrence 0 x 0 x             Physical Exam  HENT:      Head: Normocephalic.      Nose: Nose normal.      Mouth/Throat:      Mouth: Mucous membranes are moist.   Eyes:      Extraocular Movements: Extraocular movements intact.   Cardiovascular:      Rate and Rhythm: Normal rate.   Pulmonary:      Effort: Pulmonary effort is normal.   Abdominal:      General: Abdomen is flat.      Palpations: Abdomen is soft.   Musculoskeletal:         General: Normal range of motion.      Cervical back: Normal range of motion.   Skin:     General: Skin is warm.   Neurological:      General: No  focal deficit present.      Mental Status: He is alert.   Psychiatric:         Mood and Affect: Mood normal.         Significant Labs:  BMP (Last 3 Results):   Recent Labs   Lab 09/15/22  0431 09/16/22  0547 09/17/22  0616   * 104 63*   * 140 139   K 4.8 4.7 3.3*   CL 99 106 107   CO2 23 26 22*   BUN 22 17 13   CREATININE 1.0 0.9 0.8   CALCIUM 9.8 8.9 8.1*       CBC (Last 3 Results):   Recent Labs   Lab 09/15/22  0431 09/16/22  0547 09/17/22  0616   WBC 5.05 4.86 5.23   RBC 4.89 4.19* 3.91*   HGB 14.5 12.1* 11.4*   HCT 44.9 37.3* 35.9*    276 222   MCV 92 89 92   MCH 29.7 28.9 29.2   MCHC 32.3 32.4 31.8*         Significant Diagnostics:  None    Assessment/Plan:     Metastatic colon cancer to liver  72-year-old male currently being treated for metastatic colon cancer to the liver. Has been on a combination of Avastin and Folfox (last 08/18/22) and received a transverse colon stent 04/2021 as well as a diagnostic lap to rule out carcinomatosis 09/07/22. Presented with at least a partial large bowel obstruction at the level of his colonic stent with proximal colon and small bowel dilation.     Has seen marked improvement with return of bowel function, KUB showed nonobstructive gas pattern. Cytology showed no malignancy and reactive mesothelial cells.     -Fluids  -Diet: CLD + Boost breeze  -PRN pain and nausea meds  -DVT ppx          Daniel Levin MD  Colorectal Surgery  [facility]

## 2022-09-17 NOTE — SUBJECTIVE & OBJECTIVE
Subjective:     Interval History: No acute overnight events, AF, VSS. Denies any pain. NGT was discontinued. Denies any nausea or vomiting.     Post-Op Info:  * No surgery found *          Medications:  Continuous Infusions:   sodium chloride 0.9% 125 mL/hr at 09/17/22 0743     Scheduled Meds:   enoxaparin  40 mg Subcutaneous Daily    potassium chloride  40 mEq Oral Once     PRN Meds:   morphine    morphine    naloxone    ondansetron    prochlorperazine    sodium chloride 0.9%        Objective:     Vital Signs (Most Recent):  Temp: 97.6 °F (36.4 °C) (09/17/22 0744)  Pulse: 76 (09/17/22 0744)  Resp: 20 (09/17/22 0744)  BP: 128/76 (09/17/22 0744)  SpO2: 97 % (09/17/22 0744)   Vital Signs (24h Range):  Temp:  [97.4 °F (36.3 °C)-99 °F (37.2 °C)] 97.6 °F (36.4 °C)  Pulse:  [75-88] 76  Resp:  [17-20] 20  SpO2:  [95 %-98 %] 97 %  BP: (128-153)/(76-86) 128/76     Intake/Output - Last 3 Shifts         09/15 0700 09/16 0659 09/16 0700 09/17 0659 09/17 0700 09/18 0659    P.O. 0      Total Intake(mL/kg) 0 (0)      Urine (mL/kg/hr) 1500 (1.1) 200 (0.2) 850 (10.2)    Stool 0      Total Output 1500 200 850    Net -1500 -200 -850           Stool Occurrence 0 x 0 x             Physical Exam  HENT:      Head: Normocephalic.      Nose: Nose normal.      Mouth/Throat:      Mouth: Mucous membranes are moist.   Eyes:      Extraocular Movements: Extraocular movements intact.   Cardiovascular:      Rate and Rhythm: Normal rate.   Pulmonary:      Effort: Pulmonary effort is normal.   Abdominal:      General: Abdomen is flat.      Palpations: Abdomen is soft.   Musculoskeletal:         General: Normal range of motion.      Cervical back: Normal range of motion.   Skin:     General: Skin is warm.   Neurological:      General: No focal deficit present.      Mental Status: He is alert.   Psychiatric:         Mood and Affect: Mood normal.         Significant Labs:  BMP (Last 3 Results):   Recent Labs   Lab 09/15/22  0431 09/16/22  0556  09/17/22  0616   * 104 63*   * 140 139   K 4.8 4.7 3.3*   CL 99 106 107   CO2 23 26 22*   BUN 22 17 13   CREATININE 1.0 0.9 0.8   CALCIUM 9.8 8.9 8.1*       CBC (Last 3 Results):   Recent Labs   Lab 09/15/22  0431 09/16/22  0547 09/17/22  0616   WBC 5.05 4.86 5.23   RBC 4.89 4.19* 3.91*   HGB 14.5 12.1* 11.4*   HCT 44.9 37.3* 35.9*    276 222   MCV 92 89 92   MCH 29.7 28.9 29.2   MCHC 32.3 32.4 31.8*         Significant Diagnostics:  None

## 2022-09-17 NOTE — PLAN OF CARE
POC reviewed with patient.   VSS.   Pt tolerating clear liquid diet   No complaints of pain   BM today   No concerns at this time.   WCTM  Problem: Adult Inpatient Plan of Care  Goal: Plan of Care Review  Outcome: Ongoing, Progressing  Goal: Patient-Specific Goal (Individualized)  Outcome: Ongoing, Progressing  Goal: Absence of Hospital-Acquired Illness or Injury  Outcome: Ongoing, Progressing  Goal: Optimal Comfort and Wellbeing  Outcome: Ongoing, Progressing  Goal: Readiness for Transition of Care  Outcome: Ongoing, Progressing     Problem: Fluid and Electrolyte Imbalance (Acute Kidney Injury/Impairment)  Goal: Fluid and Electrolyte Balance  Outcome: Ongoing, Progressing     Problem: Oral Intake Inadequate (Acute Kidney Injury/Impairment)  Goal: Optimal Nutrition Intake  Outcome: Ongoing, Progressing     Problem: Renal Function Impairment (Acute Kidney Injury/Impairment)  Goal: Effective Renal Function  Outcome: Ongoing, Progressing     Problem: Pain Acute  Goal: Acceptable Pain Control and Functional Ability  Outcome: Ongoing, Progressing

## 2022-09-17 NOTE — ASSESSMENT & PLAN NOTE
72-year-old male currently being treated for metastatic colon cancer to the liver. Has been on a combination of Avastin and Folfox (last 08/18/22) and received a transverse colon stent 04/2021 as well as a diagnostic lap to rule out carcinomatosis 09/07/22. Presented with at least a partial large bowel obstruction at the level of his colonic stent with proximal colon and small bowel dilation.     Has seen marked improvement with return of bowel function, KUB showed nonobstructive gas pattern. Cytology showed no malignancy and reactive mesothelial cells.     -Fluids  -Diet: CLD + Boost breeze  -PRN pain and nausea meds  -DVT ppx

## 2022-09-17 NOTE — PLAN OF CARE
A&ox4. No acute issues over night. No c/o abdominal pain or nausea. 20g lft. Ac w/ NS@125. All VS are stable.  Problem: Adult Inpatient Plan of Care  Goal: Plan of Care Review  Outcome: Ongoing, Progressing  Goal: Patient-Specific Goal (Individualized)  Outcome: Ongoing, Progressing  Goal: Absence of Hospital-Acquired Illness or Injury  Outcome: Ongoing, Progressing  Goal: Optimal Comfort and Wellbeing  Outcome: Ongoing, Progressing  Goal: Readiness for Transition of Care  Outcome: Ongoing, Progressing     Problem: Fluid and Electrolyte Imbalance (Acute Kidney Injury/Impairment)  Goal: Fluid and Electrolyte Balance  Outcome: Ongoing, Progressing     Problem: Oral Intake Inadequate (Acute Kidney Injury/Impairment)  Goal: Optimal Nutrition Intake  Outcome: Ongoing, Progressing     Problem: Renal Function Impairment (Acute Kidney Injury/Impairment)  Goal: Effective Renal Function  Outcome: Ongoing, Progressing     Problem: Pain Acute  Goal: Acceptable Pain Control and Functional Ability  Outcome: Ongoing, Progressing

## 2022-09-18 LAB
ALBUMIN SERPL BCP-MCNC: 2.4 G/DL (ref 3.5–5.2)
ALP SERPL-CCNC: 65 U/L (ref 55–135)
ALT SERPL W/O P-5'-P-CCNC: 6 U/L (ref 10–44)
ANION GAP SERPL CALC-SCNC: 8 MMOL/L (ref 8–16)
ANISOCYTOSIS BLD QL SMEAR: SLIGHT
AST SERPL-CCNC: 15 U/L (ref 10–40)
BASOPHILS # BLD AUTO: 0.02 K/UL (ref 0–0.2)
BASOPHILS NFR BLD: 0.4 % (ref 0–1.9)
BILIRUB SERPL-MCNC: 0.9 MG/DL (ref 0.1–1)
BUN SERPL-MCNC: 6 MG/DL (ref 8–23)
CALCIUM SERPL-MCNC: 8.6 MG/DL (ref 8.7–10.5)
CHLORIDE SERPL-SCNC: 103 MMOL/L (ref 95–110)
CO2 SERPL-SCNC: 24 MMOL/L (ref 23–29)
CREAT SERPL-MCNC: 0.7 MG/DL (ref 0.5–1.4)
DIFFERENTIAL METHOD: ABNORMAL
EOSINOPHIL # BLD AUTO: 0.2 K/UL (ref 0–0.5)
EOSINOPHIL NFR BLD: 3.7 % (ref 0–8)
ERYTHROCYTE [DISTWIDTH] IN BLOOD BY AUTOMATED COUNT: 14.5 % (ref 11.5–14.5)
EST. GFR  (NO RACE VARIABLE): >60 ML/MIN/1.73 M^2
GLUCOSE SERPL-MCNC: 67 MG/DL (ref 70–110)
HCT VFR BLD AUTO: 37 % (ref 40–54)
HGB BLD-MCNC: 11.9 G/DL (ref 14–18)
HYPOCHROMIA BLD QL SMEAR: ABNORMAL
IMM GRANULOCYTES # BLD AUTO: 0.02 K/UL (ref 0–0.04)
IMM GRANULOCYTES NFR BLD AUTO: 0.4 % (ref 0–0.5)
LYMPHOCYTES # BLD AUTO: 1.8 K/UL (ref 1–4.8)
LYMPHOCYTES NFR BLD: 36.8 % (ref 18–48)
MCH RBC QN AUTO: 29 PG (ref 27–31)
MCHC RBC AUTO-ENTMCNC: 32.2 G/DL (ref 32–36)
MCV RBC AUTO: 90 FL (ref 82–98)
MONOCYTES # BLD AUTO: 1.2 K/UL (ref 0.3–1)
MONOCYTES NFR BLD: 25.5 % (ref 4–15)
NEUTROPHILS # BLD AUTO: 1.6 K/UL (ref 1.8–7.7)
NEUTROPHILS NFR BLD: 33.2 % (ref 38–73)
NRBC BLD-RTO: 0 /100 WBC
OVALOCYTES BLD QL SMEAR: ABNORMAL
PLATELET # BLD AUTO: 251 K/UL (ref 150–450)
PMV BLD AUTO: 10.4 FL (ref 9.2–12.9)
POCT GLUCOSE: 176 MG/DL (ref 70–110)
POIKILOCYTOSIS BLD QL SMEAR: SLIGHT
POLYCHROMASIA BLD QL SMEAR: ABNORMAL
POTASSIUM SERPL-SCNC: 3.5 MMOL/L (ref 3.5–5.1)
PROT SERPL-MCNC: 6.3 G/DL (ref 6–8.4)
RBC # BLD AUTO: 4.1 M/UL (ref 4.6–6.2)
SODIUM SERPL-SCNC: 135 MMOL/L (ref 136–145)
WBC # BLD AUTO: 4.87 K/UL (ref 3.9–12.7)

## 2022-09-18 PROCEDURE — 80053 COMPREHEN METABOLIC PANEL: CPT | Performed by: STUDENT IN AN ORGANIZED HEALTH CARE EDUCATION/TRAINING PROGRAM

## 2022-09-18 PROCEDURE — 36415 COLL VENOUS BLD VENIPUNCTURE: CPT | Performed by: STUDENT IN AN ORGANIZED HEALTH CARE EDUCATION/TRAINING PROGRAM

## 2022-09-18 PROCEDURE — 25000003 PHARM REV CODE 250: Performed by: STUDENT IN AN ORGANIZED HEALTH CARE EDUCATION/TRAINING PROGRAM

## 2022-09-18 PROCEDURE — 20600001 HC STEP DOWN PRIVATE ROOM

## 2022-09-18 PROCEDURE — 63600175 PHARM REV CODE 636 W HCPCS: Performed by: STUDENT IN AN ORGANIZED HEALTH CARE EDUCATION/TRAINING PROGRAM

## 2022-09-18 PROCEDURE — 85025 COMPLETE CBC W/AUTO DIFF WBC: CPT | Performed by: STUDENT IN AN ORGANIZED HEALTH CARE EDUCATION/TRAINING PROGRAM

## 2022-09-18 RX ADMIN — ENOXAPARIN SODIUM 40 MG: 100 INJECTION SUBCUTANEOUS at 05:09

## 2022-09-18 RX ADMIN — SODIUM CHLORIDE: 0.9 INJECTION, SOLUTION INTRAVENOUS at 01:09

## 2022-09-18 RX ADMIN — AMLODIPINE BESYLATE 10 MG: 10 TABLET ORAL at 08:09

## 2022-09-18 NOTE — PLAN OF CARE
POC reviewed. AAOx4, able to assist with his care. VSS on RA with no complaints of SOB, headaches, or dizziness. Urine output per urinal bedside, adequate. Clear liquid diet, no intake during evening shift. No BM and no complaints of nausea or vomiting. Patient denies pain, controlled with ordered medications. Resting with side rails up and call light within reach. Continuing to monitor patient status.

## 2022-09-18 NOTE — NURSING
1700: Resume care after taking report from nurse Jordon. Pt AAOx4, on RA, VS stable. Tolerated his diet, denied pain and nausea. Denied pain. Pt ambulates independently. WCTM

## 2022-09-18 NOTE — PROGRESS NOTES
[facility]  Colorectal Surgery  Progress Note    Patient Name: Javier Downs  MRN: [id number]  Admission Date: 9/15/2022  Hospital Length of Stay: 3 days  Attending Physician: EDILMA Bonilla MD    Subjective:     Interval History: No acute overnight events, AF, VSS. Denies any pain. NGT was discontinued. Denies any nausea or vomiting. Tolerating CLD well. Ambulating adequately    Post-Op Info:  * No surgery found *          Medications:  Continuous Infusions:   sodium chloride 0.9% 125 mL/hr at 09/18/22 0154     Scheduled Meds:   amLODIPine  10 mg Oral Daily    enoxaparin  40 mg Subcutaneous Daily     PRN Meds:   morphine    morphine    naloxone    ondansetron    prochlorperazine    sodium chloride 0.9%        Objective:     Vital Signs (Most Recent):  Temp: 98.3 °F (36.8 °C) (09/18/22 0739)  Pulse: 74 (09/18/22 0739)  Resp: 16 (09/18/22 0739)  BP: 132/80 (09/18/22 0739)  SpO2: 98 % (09/18/22 0739)   Vital Signs (24h Range):  Temp:  [97.9 °F (36.6 °C)-98.8 °F (37.1 °C)] 98.3 °F (36.8 °C)  Pulse:  [74-97] 74  Resp:  [16-19] 16  SpO2:  [96 %-99 %] 98 %  BP: (132-147)/(79-85) 132/80     Intake/Output - Last 3 Shifts         09/16 0700 09/17 0659 09/17 0700 09/18 0659 09/18 0700 09/19 0659    P.O.  360     Total Intake(mL/kg)  360 (6.5)     Urine (mL/kg/hr) 200 (0.2) 1050 (0.8)     Stool  0     Total Output 200 1050     Net -200 -690            Stool Occurrence 0 x 0 x             Physical Exam  HENT:      Head: Normocephalic.      Nose: Nose normal.      Mouth/Throat:      Mouth: Mucous membranes are moist.   Eyes:      Extraocular Movements: Extraocular movements intact.   Cardiovascular:      Rate and Rhythm: Normal rate.   Pulmonary:      Effort: Pulmonary effort is normal.   Abdominal:      General: Abdomen is flat.      Palpations: Abdomen is soft.   Musculoskeletal:         General: Normal range of motion.      Cervical back: Normal range of motion.   Skin:     General: Skin is warm.    Neurological:      General: No focal deficit present.      Mental Status: He is alert.   Psychiatric:         Mood and Affect: Mood normal.         Significant Labs:  BMP (Last 3 Results):   Recent Labs   Lab 09/16/22  0547 09/17/22  0616 09/18/22  0514    63* 67*    139 135*   K 4.7 3.3* 3.5    107 103   CO2 26 22* 24   BUN 17 13 6*   CREATININE 0.9 0.8 0.7   CALCIUM 8.9 8.1* 8.6*     CBC (Last 3 Results):   Recent Labs   Lab 09/16/22  0547 09/17/22  0616 09/18/22  0514   WBC 4.86 5.23 4.87   RBC 4.19* 3.91* 4.10*   HGB 12.1* 11.4* 11.9*   HCT 37.3* 35.9* 37.0*    222 251   MCV 89 92 90   MCH 28.9 29.2 29.0   MCHC 32.4 31.8* 32.2       Significant Diagnostics:  None    Assessment/Plan:     Metastatic colon cancer to liver  72-year-old male currently being treated for metastatic colon cancer to the liver. Has been on a combination of Avastin and Folfox (last 08/18/22) and received a transverse colon stent 04/2021 as well as a diagnostic lap to rule out carcinomatosis 09/07/22. Presented with at least a partial large bowel obstruction at the level of his colonic stent with proximal colon and small bowel dilation.     Has seen marked improvement with return of bowel function, KUB showed nonobstructive gas pattern. Cytology showed no malignancy and reactive mesothelial cells.     -Diet: soft mechanical diet+ Boost breeze  -PRN pain and nausea meds  -DVT ppx  -Encourage ambulation          Daniel Levin MD  Colorectal Surgery  [facility]

## 2022-09-18 NOTE — PLAN OF CARE
Plan of care reviewed with pt, who verbalized understanding. Tolerating advancement to [facility]. Soft diet w/o issue. No c/o pain. AUO. Ambulating in room independently. Call bell in reach, bed locked in lowest position. Frequent rounds made for pt safety. AAOx4, VSS.

## 2022-09-18 NOTE — ASSESSMENT & PLAN NOTE
72-year-old male currently being treated for metastatic colon cancer to the liver. Has been on a combination of Avastin and Folfox (last 08/18/22) and received a transverse colon stent 04/2021 as well as a diagnostic lap to rule out carcinomatosis 09/07/22. Presented with at least a partial large bowel obstruction at the level of his colonic stent with proximal colon and small bowel dilation.     Has seen marked improvement with return of bowel function, KUB showed nonobstructive gas pattern. Cytology showed no malignancy and reactive mesothelial cells.     -Diet: soft mechanical diet+ Boost breeze  -PRN pain and nausea meds  -DVT ppx  -Encourage ambulation

## 2022-09-19 LAB
ALBUMIN SERPL BCP-MCNC: 2.3 G/DL (ref 3.5–5.2)
ALP SERPL-CCNC: 70 U/L (ref 55–135)
ALT SERPL W/O P-5'-P-CCNC: 6 U/L (ref 10–44)
ANION GAP SERPL CALC-SCNC: 5 MMOL/L (ref 8–16)
ANISOCYTOSIS BLD QL SMEAR: SLIGHT
AST SERPL-CCNC: 17 U/L (ref 10–40)
BASOPHILS # BLD AUTO: 0.03 K/UL (ref 0–0.2)
BASOPHILS NFR BLD: 0.5 % (ref 0–1.9)
BILIRUB SERPL-MCNC: 0.5 MG/DL (ref 0.1–1)
BUN SERPL-MCNC: 5 MG/DL (ref 8–23)
CALCIUM SERPL-MCNC: 8.9 MG/DL (ref 8.7–10.5)
CHLORIDE SERPL-SCNC: 100 MMOL/L (ref 95–110)
CO2 SERPL-SCNC: 30 MMOL/L (ref 23–29)
CREAT SERPL-MCNC: 0.8 MG/DL (ref 0.5–1.4)
DIFFERENTIAL METHOD: ABNORMAL
EOSINOPHIL # BLD AUTO: 0.2 K/UL (ref 0–0.5)
EOSINOPHIL NFR BLD: 3.6 % (ref 0–8)
ERYTHROCYTE [DISTWIDTH] IN BLOOD BY AUTOMATED COUNT: 14.4 % (ref 11.5–14.5)
EST. GFR  (NO RACE VARIABLE): >60 ML/MIN/1.73 M^2
GLUCOSE SERPL-MCNC: 102 MG/DL (ref 70–110)
HCT VFR BLD AUTO: 37 % (ref 40–54)
HGB BLD-MCNC: 12.3 G/DL (ref 14–18)
IMM GRANULOCYTES # BLD AUTO: 0.02 K/UL (ref 0–0.04)
IMM GRANULOCYTES NFR BLD AUTO: 0.3 % (ref 0–0.5)
LYMPHOCYTES # BLD AUTO: 2.5 K/UL (ref 1–4.8)
LYMPHOCYTES NFR BLD: 41.2 % (ref 18–48)
MCH RBC QN AUTO: 29.9 PG (ref 27–31)
MCHC RBC AUTO-ENTMCNC: 33.2 G/DL (ref 32–36)
MCV RBC AUTO: 90 FL (ref 82–98)
MONOCYTES # BLD AUTO: 1.5 K/UL (ref 0.3–1)
MONOCYTES NFR BLD: 23.5 % (ref 4–15)
NEUTROPHILS # BLD AUTO: 1.9 K/UL (ref 1.8–7.7)
NEUTROPHILS NFR BLD: 30.9 % (ref 38–73)
NRBC BLD-RTO: 0 /100 WBC
OVALOCYTES BLD QL SMEAR: ABNORMAL
PLATELET # BLD AUTO: 300 K/UL (ref 150–450)
PLATELET BLD QL SMEAR: ABNORMAL
PMV BLD AUTO: 10 FL (ref 9.2–12.9)
POCT GLUCOSE: 109 MG/DL (ref 70–110)
POIKILOCYTOSIS BLD QL SMEAR: SLIGHT
POTASSIUM SERPL-SCNC: 3.3 MMOL/L (ref 3.5–5.1)
PROT SERPL-MCNC: 6.3 G/DL (ref 6–8.4)
RBC # BLD AUTO: 4.12 M/UL (ref 4.6–6.2)
SODIUM SERPL-SCNC: 135 MMOL/L (ref 136–145)
WBC # BLD AUTO: 6.16 K/UL (ref 3.9–12.7)

## 2022-09-19 PROCEDURE — 25000003 PHARM REV CODE 250: Performed by: STUDENT IN AN ORGANIZED HEALTH CARE EDUCATION/TRAINING PROGRAM

## 2022-09-19 PROCEDURE — 80053 COMPREHEN METABOLIC PANEL: CPT | Performed by: STUDENT IN AN ORGANIZED HEALTH CARE EDUCATION/TRAINING PROGRAM

## 2022-09-19 PROCEDURE — 99233 PR SUBSEQUENT HOSPITAL CARE,LEVL III: ICD-10-PCS | Mod: ,,, | Performed by: COLON & RECTAL SURGERY

## 2022-09-19 PROCEDURE — 99233 SBSQ HOSP IP/OBS HIGH 50: CPT | Mod: ,,, | Performed by: COLON & RECTAL SURGERY

## 2022-09-19 PROCEDURE — 85025 COMPLETE CBC W/AUTO DIFF WBC: CPT | Performed by: STUDENT IN AN ORGANIZED HEALTH CARE EDUCATION/TRAINING PROGRAM

## 2022-09-19 PROCEDURE — 20600001 HC STEP DOWN PRIVATE ROOM

## 2022-09-19 PROCEDURE — 36415 COLL VENOUS BLD VENIPUNCTURE: CPT | Performed by: STUDENT IN AN ORGANIZED HEALTH CARE EDUCATION/TRAINING PROGRAM

## 2022-09-19 PROCEDURE — 63600175 PHARM REV CODE 636 W HCPCS: Performed by: STUDENT IN AN ORGANIZED HEALTH CARE EDUCATION/TRAINING PROGRAM

## 2022-09-19 RX ORDER — SIMETHICONE 80 MG
1 TABLET,CHEWABLE ORAL 3 TIMES DAILY PRN
Status: DISCONTINUED | OUTPATIENT
Start: 2022-09-19 | End: 2022-09-21 | Stop reason: HOSPADM

## 2022-09-19 RX ORDER — ACETAMINOPHEN 325 MG/1
650 TABLET ORAL EVERY 6 HOURS PRN
Status: DISCONTINUED | OUTPATIENT
Start: 2022-09-19 | End: 2022-09-21 | Stop reason: HOSPADM

## 2022-09-19 RX ORDER — POLYETHYLENE GLYCOL 3350 17 G/17G
17 POWDER, FOR SOLUTION ORAL DAILY
Status: DISCONTINUED | OUTPATIENT
Start: 2022-09-19 | End: 2022-09-19

## 2022-09-19 RX ADMIN — ENOXAPARIN SODIUM 40 MG: 100 INJECTION SUBCUTANEOUS at 04:09

## 2022-09-19 RX ADMIN — POLYETHYLENE GLYCOL 3350 17 G: 17 POWDER, FOR SOLUTION ORAL at 09:09

## 2022-09-19 RX ADMIN — AMLODIPINE BESYLATE 10 MG: 10 TABLET ORAL at 09:09

## 2022-09-19 RX ADMIN — SIMETHICONE 80 MG: 80 TABLET, CHEWABLE ORAL at 04:09

## 2022-09-19 RX ADMIN — POTASSIUM BICARBONATE 40 MEQ: 391 TABLET, EFFERVESCENT ORAL at 09:09

## 2022-09-19 NOTE — PLAN OF CARE
POC reviewed. AAOx4, able to assist with his care. VSS on RA with no complaints of SOB, headaches, or dizziness. Urine output per urinal bedside, adequate. Mechanical Soft diet, no intake during evening shift. No BM and no complaints of nausea or vomiting. Patient denies pain, controlled with ordered medications. Resting with side rails up and call light within reach. Continuing to monitor patient status.

## 2022-09-19 NOTE — PLAN OF CARE
[facility]  Discharge Reassessment    Primary Care Provider: Renetta García MD    Expected Discharge Date: 9/19/2022    Reassessment (most recent)       Discharge Reassessment - 09/19/22 1202          Discharge Reassessment    Assessment Type Discharge Planning Reassessment     Discharge Plan A Home     Discharge Plan B Home Health     DME Needed Upon Discharge  none     Discharge Barriers Identified None     Why the patient remains in the hospital Requires continued medical care                     Azeb Aburto RN, CM   Ext: [phone]

## 2022-09-19 NOTE — SUBJECTIVE & OBJECTIVE
Subjective:     Interval History: No acute overnight events, AF, VSS. Denies any pain. Denies any nausea or vomiting. Tolerating mechanical soft diet well; however, has not had a bowel movement in 2 days. Ambulating adequately    Post-Op Info:  * No surgery found *          Medications:  Continuous Infusions:      Scheduled Meds:   amLODIPine  10 mg Oral Daily    enoxaparin  40 mg Subcutaneous Daily    polyethylene glycol  17 g Oral Daily    potassium bicarbonate  40 mEq Oral Once     PRN Meds:   morphine    morphine    naloxone    ondansetron    prochlorperazine    sodium chloride 0.9%        Objective:     Vital Signs (Most Recent):  Temp: 98.2 °F (36.8 °C) (09/19/22 0755)  Pulse: 73 (09/19/22 0755)  Resp: 16 (09/19/22 0755)  BP: 129/83 (09/19/22 0755)  SpO2: 96 % (09/19/22 0755)   Vital Signs (24h Range):  Temp:  [97.3 °F (36.3 °C)-98.6 °F (37 °C)] 98.2 °F (36.8 °C)  Pulse:  [73-84] 73  Resp:  [16-19] 16  SpO2:  [96 %-99 %] 96 %  BP: (117-136)/(72-83) 129/83     Intake/Output - Last 3 Shifts         09/17 0700  09/18 0659 09/18 0700  09/19 0659 09/19 0700  09/20 0659    P.O. 360 840     Total Intake(mL/kg) 360 (6.5) 840 (15.3)     Urine (mL/kg/hr) 1050 (0.8) 1400 (1.1) 125 (2.1)    Stool 0 0     Total Output 1050 1400 125    Net -690 -560 -125           Stool Occurrence 0 x 0 x             Physical Exam  HENT:      Head: Normocephalic.      Nose: Nose normal.      Mouth/Throat:      Mouth: Mucous membranes are moist.   Eyes:      Extraocular Movements: Extraocular movements intact.   Cardiovascular:      Rate and Rhythm: Normal rate.   Pulmonary:      Effort: Pulmonary effort is normal.   Abdominal:      General: Abdomen is flat.      Palpations: Abdomen is soft.   Musculoskeletal:         General: Normal range of motion.      Cervical back: Normal range of motion.   Skin:     General: Skin is warm.   Neurological:      General: No focal deficit present.      Mental Status: He is alert.   Psychiatric:          Mood and Affect: Mood normal.         Significant Labs:  BMP (Last 3 Results):   Recent Labs   Lab 09/17/22  0616 09/18/22  0514 09/19/22  0448   GLU 63* 67* 102    135* 135*   K 3.3* 3.5 3.3*    103 100   CO2 22* 24 30*   BUN 13 6* 5*   CREATININE 0.8 0.7 0.8   CALCIUM 8.1* 8.6* 8.9       CBC (Last 3 Results):   Recent Labs   Lab 09/17/22  0616 09/18/22  0514 09/19/22  0448   WBC 5.23 4.87 6.16   RBC 3.91* 4.10* 4.12*   HGB 11.4* 11.9* 12.3*   HCT 35.9* 37.0* 37.0*    251 300   MCV 92 90 90   MCH 29.2 29.0 29.9   MCHC 31.8* 32.2 33.2         Significant Diagnostics:  None

## 2022-09-19 NOTE — ASSESSMENT & PLAN NOTE
72-year-old male currently being treated for metastatic colon cancer to the liver. Has been on a combination of Avastin and Folfox (last 08/18/22) and received a transverse colon stent 04/2021 as well as a diagnostic lap to rule out carcinomatosis 09/07/22. Presented with at least a partial large bowel obstruction at the level of his colonic stent with proximal colon and small bowel dilation.     Has seen marked improvement with return of bowel function, KUB showed nonobstructive gas pattern. Cytology showed no malignancy and reactive mesothelial cells.     -Diet: full liquid + Boost breeze  -PRN pain and nausea meds  -DVT ppx  -Encourage ambulation  -daily miralax

## 2022-09-19 NOTE — PROGRESS NOTES
[facility]  Colorectal Surgery  Progress Note    Patient Name: Javier Downs  MRN: [id number]  Admission Date: 9/15/2022  Hospital Length of Stay: 4 days  Attending Physician: EDILMA Bonilla MD    Subjective:     Interval History: No acute overnight events, AF, VSS. Denies any pain. Denies any nausea or vomiting. Tolerating mechanical soft diet well; however, has not had a bowel movement in 2 days. Ambulating adequately    Post-Op Info:  * No surgery found *          Medications:  Continuous Infusions:      Scheduled Meds:   amLODIPine  10 mg Oral Daily    enoxaparin  40 mg Subcutaneous Daily    polyethylene glycol  17 g Oral Daily    potassium bicarbonate  40 mEq Oral Once     PRN Meds:   morphine    morphine    naloxone    ondansetron    prochlorperazine    sodium chloride 0.9%        Objective:     Vital Signs (Most Recent):  Temp: 98.2 °F (36.8 °C) (09/19/22 0755)  Pulse: 73 (09/19/22 0755)  Resp: 16 (09/19/22 0755)  BP: 129/83 (09/19/22 0755)  SpO2: 96 % (09/19/22 0755)   Vital Signs (24h Range):  Temp:  [97.3 °F (36.3 °C)-98.6 °F (37 °C)] 98.2 °F (36.8 °C)  Pulse:  [73-84] 73  Resp:  [16-19] 16  SpO2:  [96 %-99 %] 96 %  BP: (117-136)/(72-83) 129/83     Intake/Output - Last 3 Shifts         09/17 0700  09/18 0659 09/18 0700  09/19 0659 09/19 0700  09/20 0659    P.O. 360 840     Total Intake(mL/kg) 360 (6.5) 840 (15.3)     Urine (mL/kg/hr) 1050 (0.8) 1400 (1.1) 125 (2.1)    Stool 0 0     Total Output 1050 1400 125    Net -690 -560 -125           Stool Occurrence 0 x 0 x             Physical Exam  HENT:      Head: Normocephalic.      Nose: Nose normal.      Mouth/Throat:      Mouth: Mucous membranes are moist.   Eyes:      Extraocular Movements: Extraocular movements intact.   Cardiovascular:      Rate and Rhythm: Normal rate.   Pulmonary:      Effort: Pulmonary effort is normal.   Abdominal:      General: Abdomen is flat.      Palpations: Abdomen is soft.   Musculoskeletal:         General: Normal  range of motion.      Cervical back: Normal range of motion.   Skin:     General: Skin is warm.   Neurological:      General: No focal deficit present.      Mental Status: He is alert.   Psychiatric:         Mood and Affect: Mood normal.         Significant Labs:  BMP (Last 3 Results):   Recent Labs   Lab 09/17/22  0616 09/18/22  0514 09/19/22  0448   GLU 63* 67* 102    135* 135*   K 3.3* 3.5 3.3*    103 100   CO2 22* 24 30*   BUN 13 6* 5*   CREATININE 0.8 0.7 0.8   CALCIUM 8.1* 8.6* 8.9       CBC (Last 3 Results):   Recent Labs   Lab 09/17/22  0616 09/18/22  0514 09/19/22  0448   WBC 5.23 4.87 6.16   RBC 3.91* 4.10* 4.12*   HGB 11.4* 11.9* 12.3*   HCT 35.9* 37.0* 37.0*    251 300   MCV 92 90 90   MCH 29.2 29.0 29.9   MCHC 31.8* 32.2 33.2         Significant Diagnostics:  None    Assessment/Plan:     Metastatic colon cancer to liver  72-year-old male currently being treated for metastatic colon cancer to the liver. Has been on a combination of Avastin and Folfox (last 08/18/22) and received a transverse colon stent 04/2021 as well as a diagnostic lap to rule out carcinomatosis 09/07/22. Presented with at least a partial large bowel obstruction at the level of his colonic stent with proximal colon and small bowel dilation.     Has seen marked improvement with return of bowel function, KUB showed nonobstructive gas pattern. Cytology showed no malignancy and reactive mesothelial cells.     -Diet: full liquid + Boost breeze  -PRN pain and nausea meds  -DVT ppx  -Encourage ambulation  -daily miralax          SUKUMAR GUTHRIE MD  Colorectal Surgery  [facility]

## 2022-09-20 LAB
ANION GAP SERPL CALC-SCNC: 10 MMOL/L (ref 8–16)
BACTERIA BLD CULT: NORMAL
BACTERIA BLD CULT: NORMAL
BUN SERPL-MCNC: 7 MG/DL (ref 8–23)
CALCIUM SERPL-MCNC: 9.1 MG/DL (ref 8.7–10.5)
CHLORIDE SERPL-SCNC: 100 MMOL/L (ref 95–110)
CO2 SERPL-SCNC: 27 MMOL/L (ref 23–29)
CREAT SERPL-MCNC: 0.7 MG/DL (ref 0.5–1.4)
EST. GFR  (NO RACE VARIABLE): >60 ML/MIN/1.73 M^2
GLUCOSE SERPL-MCNC: 89 MG/DL (ref 70–110)
MAGNESIUM SERPL-MCNC: 1.8 MG/DL (ref 1.6–2.6)
PHOSPHATE SERPL-MCNC: 3.1 MG/DL (ref 2.7–4.5)
POTASSIUM SERPL-SCNC: 4 MMOL/L (ref 3.5–5.1)
SODIUM SERPL-SCNC: 137 MMOL/L (ref 136–145)

## 2022-09-20 PROCEDURE — 36415 COLL VENOUS BLD VENIPUNCTURE: CPT | Performed by: STUDENT IN AN ORGANIZED HEALTH CARE EDUCATION/TRAINING PROGRAM

## 2022-09-20 PROCEDURE — 25000003 PHARM REV CODE 250: Performed by: STUDENT IN AN ORGANIZED HEALTH CARE EDUCATION/TRAINING PROGRAM

## 2022-09-20 PROCEDURE — 83735 ASSAY OF MAGNESIUM: CPT | Performed by: STUDENT IN AN ORGANIZED HEALTH CARE EDUCATION/TRAINING PROGRAM

## 2022-09-20 PROCEDURE — 84100 ASSAY OF PHOSPHORUS: CPT | Performed by: STUDENT IN AN ORGANIZED HEALTH CARE EDUCATION/TRAINING PROGRAM

## 2022-09-20 PROCEDURE — 63600175 PHARM REV CODE 636 W HCPCS: Performed by: STUDENT IN AN ORGANIZED HEALTH CARE EDUCATION/TRAINING PROGRAM

## 2022-09-20 PROCEDURE — 20600001 HC STEP DOWN PRIVATE ROOM

## 2022-09-20 PROCEDURE — 80048 BASIC METABOLIC PNL TOTAL CA: CPT | Performed by: STUDENT IN AN ORGANIZED HEALTH CARE EDUCATION/TRAINING PROGRAM

## 2022-09-20 RX ADMIN — AMLODIPINE BESYLATE 10 MG: 10 TABLET ORAL at 08:09

## 2022-09-20 RX ADMIN — ENOXAPARIN SODIUM 40 MG: 100 INJECTION SUBCUTANEOUS at 05:09

## 2022-09-20 NOTE — PLAN OF CARE
POC is reviewed and understood by patient. VSS while on room air. Independent to toilet. No c/o pain, nausea. No sign of distress noted. Wife at bedside. Call bell in reach. HOB elevated. Bed in lowest position. WCTM.

## 2022-09-20 NOTE — SUBJECTIVE & OBJECTIVE
Subjective:     Interval History: NAEON. Denies pain, n/v. Reports having regular BMs on miralax. Ambulating.     Post-Op Info:  * No surgery found *          Medications:  Continuous Infusions:  Scheduled Meds:   amLODIPine  10 mg Oral Daily    enoxaparin  40 mg Subcutaneous Daily     PRN Meds:   acetaminophen    naloxone    ondansetron    prochlorperazine    simethicone    sodium chloride 0.9%        Objective:     Vital Signs (Most Recent):  Temp: 97.3 °F (36.3 °C) (09/20/22 0823)  Pulse: 74 (09/20/22 0823)  Resp: 18 (09/20/22 0823)  BP: 126/72 (09/20/22 0823)  SpO2: 100 % (09/20/22 0823) Vital Signs (24h Range):  Temp:  [97.3 °F (36.3 °C)-98.5 °F (36.9 °C)] 97.3 °F (36.3 °C)  Pulse:  [] 74  Resp:  [16-19] 18  SpO2:  [98 %-100 %] 100 %  BP: (121-143)/(72-92) 126/72     Intake/Output - Last 3 Shifts         09/18 0700  09/19 0659 09/19 0700  09/20 0659 09/20 0700  09/21 0659    P.O. 840 478     Total Intake(mL/kg) 840 (15.3) 478 (8.7)     Urine (mL/kg/hr) 1400 (1.1) 125 (0.1)     Stool 0 0     Total Output 1400 125     Net -560 +353            Urine Occurrence  3 x     Stool Occurrence 0 x 2 x             Physical Exam  Vitals and nursing note reviewed.   Constitutional:       Appearance: He is well-developed.   HENT:      Head: Normocephalic and atraumatic.      Nose: Nose normal.   Eyes:      Extraocular Movements: Extraocular movements intact.      Conjunctiva/sclera: Conjunctivae normal.   Cardiovascular:      Rate and Rhythm: Normal rate.      Pulses: Normal pulses.   Pulmonary:      Effort: Pulmonary effort is normal.   Abdominal:      General: There is no distension.      Palpations: Abdomen is soft.      Tenderness: There is no abdominal tenderness.   Musculoskeletal:         General: Normal range of motion.      Cervical back: Normal range of motion and neck supple.   Skin:     General: Skin is warm and dry.      Capillary Refill: Capillary refill takes less than 2 seconds.   Neurological:       Mental Status: He is alert and oriented to person, place, and time.   Psychiatric:         Behavior: Behavior normal.         Significant Labs:  CBC (Last 3 Results):   Recent Labs   Lab 09/17/22  0616 09/18/22  0514 09/19/22  0448   WBC 5.23 4.87 6.16   RBC 3.91* 4.10* 4.12*   HGB 11.4* 11.9* 12.3*   HCT 35.9* 37.0* 37.0*    251 300   MCV 92 90 90   MCH 29.2 29.0 29.9   MCHC 31.8* 32.2 33.2     CMP:   Recent Labs   Lab 09/19/22  0448 09/20/22  0535    89   CALCIUM 8.9 9.1   ALBUMIN 2.3*  --    PROT 6.3  --    * 137   K 3.3* 4.0   CO2 30* 27    100   BUN 5* 7*   CREATININE 0.8 0.7   ALKPHOS 70  --    ALT 6*  --    AST 17  --    BILITOT 0.5  --          Significant Diagnostics:  None

## 2022-09-20 NOTE — ASSESSMENT & PLAN NOTE
72-year-old male currently being treated for metastatic colon cancer to the liver. Has been on a combination of Avastin and Folfox (last 08/18/22) and received a transverse colon stent 04/2021 as well as a diagnostic lap to rule out carcinomatosis 09/07/22. Presented with at least a partial large bowel obstruction at the level of his colonic stent with proximal colon and small bowel dilation.     Miralax has been beneficial, pt reporting BMs   KUB 9/16/22 showed nonobstructive gas pattern and colonic stent.  Cytology or peritoneal fluid showed no malignancy and reactive mesothelial cells.     -Diet: full liquid + Boost breeze  -PRN pain and nausea meds  -DVT ppx  -Encourage ambulation  -daily miralax

## 2022-09-20 NOTE — NURSING
Patient is AxO x4. Vital signs have been stable. Patient did c/o 10/10 cramping abdominal pain after having  a BM. On call provider notified of patient status. PRN medication given and patient reported relief. He denies having any needs at this time.

## 2022-09-20 NOTE — PROGRESS NOTES
[facility]  Colorectal Surgery  Progress Note    Patient Name: Javier Downs  MRN: [id number]  Admission Date: 9/15/2022  Hospital Length of Stay: 5 days  Attending Physician: EDILMA Bonilla MD    Subjective:     Interval History: NAEON. Denies pain, n/v. Reports having regular BMs on miralax. Ambulating.     Post-Op Info:  * No surgery found *          Medications:  Continuous Infusions:  Scheduled Meds:   amLODIPine  10 mg Oral Daily    enoxaparin  40 mg Subcutaneous Daily     PRN Meds:   acetaminophen    naloxone    ondansetron    prochlorperazine    simethicone    sodium chloride 0.9%        Objective:     Vital Signs (Most Recent):  Temp: 97.3 °F (36.3 °C) (09/20/22 0823)  Pulse: 74 (09/20/22 0823)  Resp: 18 (09/20/22 0823)  BP: 126/72 (09/20/22 0823)  SpO2: 100 % (09/20/22 0823) Vital Signs (24h Range):  Temp:  [97.3 °F (36.3 °C)-98.5 °F (36.9 °C)] 97.3 °F (36.3 °C)  Pulse:  [] 74  Resp:  [16-19] 18  SpO2:  [98 %-100 %] 100 %  BP: (121-143)/(72-92) 126/72     Intake/Output - Last 3 Shifts         09/18 0700  09/19 0659 09/19 0700  09/20 0659 09/20 0700  09/21 0659    P.O. 840 478     Total Intake(mL/kg) 840 (15.3) 478 (8.7)     Urine (mL/kg/hr) 1400 (1.1) 125 (0.1)     Stool 0 0     Total Output 1400 125     Net -560 +353            Urine Occurrence  3 x     Stool Occurrence 0 x 2 x             Physical Exam  Vitals and nursing note reviewed.   Constitutional:       Appearance: He is well-developed.   HENT:      Head: Normocephalic and atraumatic.      Nose: Nose normal.   Eyes:      Extraocular Movements: Extraocular movements intact.      Conjunctiva/sclera: Conjunctivae normal.   Cardiovascular:      Rate and Rhythm: Normal rate.      Pulses: Normal pulses.   Pulmonary:      Effort: Pulmonary effort is normal.   Abdominal:      General: There is no distension.      Palpations: Abdomen is soft.      Tenderness: There is no abdominal tenderness.   Musculoskeletal:         General: Normal  range of motion.      Cervical back: Normal range of motion and neck supple.   Skin:     General: Skin is warm and dry.      Capillary Refill: Capillary refill takes less than 2 seconds.   Neurological:      Mental Status: He is alert and oriented to person, place, and time.   Psychiatric:         Behavior: Behavior normal.         Significant Labs:  CBC (Last 3 Results):   Recent Labs   Lab 09/17/22  0616 09/18/22  0514 09/19/22  0448   WBC 5.23 4.87 6.16   RBC 3.91* 4.10* 4.12*   HGB 11.4* 11.9* 12.3*   HCT 35.9* 37.0* 37.0*    251 300   MCV 92 90 90   MCH 29.2 29.0 29.9   MCHC 31.8* 32.2 33.2     CMP:   Recent Labs   Lab 09/19/22  0448 09/20/22  0535    89   CALCIUM 8.9 9.1   ALBUMIN 2.3*  --    PROT 6.3  --    * 137   K 3.3* 4.0   CO2 30* 27    100   BUN 5* 7*   CREATININE 0.8 0.7   ALKPHOS 70  --    ALT 6*  --    AST 17  --    BILITOT 0.5  --          Significant Diagnostics:  None    Assessment/Plan:     Metastatic colon cancer to liver  72-year-old male currently being treated for metastatic colon cancer to the liver. Has been on a combination of Avastin and Folfox (last 08/18/22) and received a transverse colon stent 04/2021 as well as a diagnostic lap to rule out carcinomatosis 09/07/22. Presented with at least a partial large bowel obstruction at the level of his colonic stent with proximal colon and small bowel dilation.     Miralax has been beneficial, pt reporting BMs   KUB 9/16/22 showed nonobstructive gas pattern and colonic stent.  Cytology or peritoneal fluid showed no malignancy and reactive mesothelial cells.     -Diet: full liquid + Boost breeze  -PRN pain and nausea meds  -DVT ppx  -Encourage ambulation  -daily miralax          Michela Marcum NP  Colorectal Surgery  [facility]

## 2022-09-20 NOTE — PLAN OF CARE
Patient A&Ox4, all VSS, on room air, no tele.  Family member at bedside.  Patient independent with ambulation.  Voids spontaneously to urinal.  LBM 9/19.  No drains/no wounds.  Continues on full liquid diet.  Consult to IR today.  Possible discharge home tomorrow.  Will continue to monitor.    Problem: Adult Inpatient Plan of Care  Goal: Plan of Care Review  Outcome: Ongoing, Progressing  Goal: Patient-Specific Goal (Individualized)  Outcome: Ongoing, Progressing  Goal: Absence of Hospital-Acquired Illness or Injury  Outcome: Ongoing, Progressing  Goal: Optimal Comfort and Wellbeing  Outcome: Ongoing, Progressing  Goal: Readiness for Transition of Care  Outcome: Ongoing, Progressing     Problem: Fluid and Electrolyte Imbalance (Acute Kidney Injury/Impairment)  Goal: Fluid and Electrolyte Balance  Outcome: Ongoing, Progressing     Problem: Oral Intake Inadequate (Acute Kidney Injury/Impairment)  Goal: Optimal Nutrition Intake  Outcome: Ongoing, Progressing     Problem: Renal Function Impairment (Acute Kidney Injury/Impairment)  Goal: Effective Renal Function  Outcome: Ongoing, Progressing     Problem: Pain Acute  Goal: Acceptable Pain Control and Functional Ability  Outcome: Ongoing, Progressing

## 2022-09-20 NOTE — CONSULTS
Radiology Consult    Javier Downs is a 72 y.o. 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 IR consulted for Y90 treatment of right hepatic lobe mass.     Past Medical History:   Diagnosis Date    MARIA A (acute kidney injury) 8/18/2022    Hypertension     Metastatic colon cancer to liver 4/22/2021     Past Surgical History:   Procedure Laterality Date    COLONOSCOPY N/A 4/20/2021    Procedure: COLONOSCOPY with possible stent;  Surgeon: EDILMA Bonilla MD;  Location: [facility] ENDO (2ND FLR);  Service: Colon and Rectal;  Laterality: N/A;    DIAGNOSTIC LAPAROSCOPY N/A 9/7/2022    Procedure: LAPAROSCOPY, DIAGNOSTIC;  Surgeon: Adrian Rodriguez MD;  Location: [facility] OR 2ND FLR;  Service: General;  Laterality: N/A;    INSERTION OF TUNNELED CENTRAL VENOUS CATHETER (CVC) WITH SUBCUTANEOUS PORT N/A 5/3/2021    Procedure: LENHUHUVO-ALDX-D-CATH;  Surgeon: Francisco Layton MD;  Location: [facility] OR 2ND FLR;  Service: Vascular;  Laterality: N/A;       Discussed with primary team, Dr. Felix Kapoor.    Imaging reviewed with Radiology staff, Dr. Felix Kapoor.     Procedure: Y90 radioembolization.     Scheduled Meds:    amLODIPine  10 mg Oral Daily    enoxaparin  40 mg Subcutaneous Daily     Continuous Infusions:   PRN Meds:acetaminophen, naloxone, ondansetron, prochlorperazine, simethicone, sodium chloride 0.9%    Allergies: Review of patient's allergies indicates:  No Known Allergies    Labs:  No results for input(s): INR in the last 168 hours.    Invalid input(s):  PT,  PTT    Recent Labs   Lab 09/19/22  0448   WBC 6.16   HGB 12.3*   HCT 37.0*   MCV 90         Recent Labs   Lab 09/19/22  0448 09/20/22  0535    89   * 137   K 3.3* 4.0    100   CO2 30* 27   BUN 5* 7*   CREATININE 0.8 0.7   CALCIUM 8.9 9.1   MG  --  1.8   ALT 6*  --    AST 17  --    ALBUMIN 2.3*  --    BILITOT 0.5  --          Vitals (Most Recent):  Temp: 97.3 °F (36.3 °C) (09/20/22 0823)  Pulse: 74  (09/20/22 0823)  Resp: 18 (09/20/22 0823)  BP: 126/72 (09/20/22 0823)  SpO2: 100 % (09/20/22 0823)    Plan:   Plan for outpatient Pre-Y and Y90 treatment to be scheduled for after patient undergoes colectomy.    Sissy Mazariegos

## 2022-09-21 VITALS
BODY MASS INDEX: 19.03 KG/M2 | OXYGEN SATURATION: 97 % | DIASTOLIC BLOOD PRESSURE: 68 MMHG | WEIGHT: 121.25 LBS | SYSTOLIC BLOOD PRESSURE: 113 MMHG | HEIGHT: 67 IN | TEMPERATURE: 98 F | RESPIRATION RATE: 16 BRPM | HEART RATE: 80 BPM

## 2022-09-21 DIAGNOSIS — C18.9 COLON CANCER METASTASIZED TO LIVER: Primary | ICD-10-CM

## 2022-09-21 DIAGNOSIS — C78.7 COLON CANCER METASTASIZED TO LIVER: Primary | ICD-10-CM

## 2022-09-21 PROCEDURE — 99238 HOSP IP/OBS DSCHRG MGMT 30/<: CPT | Mod: ,,, | Performed by: NURSE PRACTITIONER

## 2022-09-21 PROCEDURE — 25000003 PHARM REV CODE 250: Performed by: STUDENT IN AN ORGANIZED HEALTH CARE EDUCATION/TRAINING PROGRAM

## 2022-09-21 PROCEDURE — 99238 PR HOSPITAL DISCHARGE DAY,<30 MIN: ICD-10-PCS | Mod: ,,, | Performed by: NURSE PRACTITIONER

## 2022-09-21 RX ADMIN — AMLODIPINE BESYLATE 10 MG: 10 TABLET ORAL at 09:09

## 2022-09-21 NOTE — DISCHARGE SUMMARY
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 His colon cancer was nearly obstructive and was stented 04/20/21 with Dr. Bonilla. He also recently underwent a diagnostic lap with washings to rule out carcinomatosis in preparation for a formal liver resection 09/07/22 with Dr. BUD Rodriguez. He states he was recovering well but started to develop moderate to severe jaya-umbilical pain 24 hours ago associated with nausea, vomiting, and obstipation. He notes he was initially passing a lot of flatus but that has since stopped. Denies fevers, chills, chest pain, or any difficulty breathing.       * No surgery found *     Hospital Course:  72-year-old male currently being treated for metastatic colon cancer to the liver. Has been on a combination of Avastin and Folfox (last 08/18/22) and received a transverse colon stent 04/2021 as well as a diagnostic lap to rule out carcinomatosis 09/07/22. Now with at least a partial large bowel obstruction at the level of his colonic stent with proximal colon and small bowel dilation. Urgent/emergent surgical intervention, especially given no evidence of peritonitis or perforation is suboptimal given recent Avastin dosing. Will manage conservatively and discuss possible re-stenting. Admitted from ER, bowel rest, NGT in place.  Once bowel function resumed which took several days NGT removed and diet advanced to Full Liquids.  On day of discharge  pt is castillo fl diet, inc line healing well, positive for bm with flatus, ambulating in perez without difficulty, adequate pain control with oral medication, VS stable and afebrile.    He is to remain on full liquid with boost and fu with Dr. Bonilla 2 week, expect surgery 3 weeks       Goals of Care Treatment Preferences:  Code Status: Full Code      Consults (From admission, onward)          Status Ordering Provider     Inpatient consult to Interventional Radiology  Once        Provider:  (Not yet assigned)    Completed RUDY CALVIN     Inpatient consult to Colorectal Surgery  Once        Provider:  (Not yet assigned)    Completed SHIN PEREIRA            Significant Diagnostic Studies: Labs:   BMP:   Recent Labs   Lab 09/20/22  0535   GLU 89      K 4.0      CO2 27   BUN 7*   CREATININE 0.7   CALCIUM 9.1   MG 1.8    and CBC No results for input(s): WBC, HGB, HCT, PLT in the last 48 hours.    Pending Diagnostic Studies:       None          Final Active Diagnoses:    Diagnosis Date Noted POA    PRINCIPAL PROBLEM:  Metastatic colon cancer to liver [C18.9, C78.7] 04/22/2021 Yes      Problems Resolved During this Admission:      Discharged Condition: good    Disposition: Home or Self Care    Follow Up:   Follow-up Information       H Jared Bonilla MD Follow up in 2 week(s).    Specialty: Colon and Rectal Surgery  Why: see Dr. Bonilla 2 weeks expect to have surgery in 3 weeks  Contact information:  [address] [place] [address] [id number] [phone]                           Patient Instructions:      Diet full liquid     Lifting restrictions   Order Comments: No lifting anything greater than 5 pounds     No dressing needed   Order Comments: May shower, no tub bath     Notify your health care provider if you experience any of the following:  temperature >100.4     Notify your health care provider if you experience any of the following:  persistent nausea and vomiting or diarrhea     Notify your health care  provider if you experience any of the following:  severe uncontrolled pain     Notify your health care provider if you experience any of the following:  redness, tenderness, or signs of infection (pain, swelling, redness, odor or green/yellow discharge around incision site)     Notify your health care provider if you experience any of the following:  difficulty breathing or increased cough     Notify your health care provider if you experience any of the following:  severe persistent headache     Notify your health care provider if you experience any of the following:  worsening rash     Notify your health care provider if you experience any of the following:  persistent dizziness, light-headedness, or visual disturbances     Notify your health care provider if you experience any of the following:  increased confusion or weakness     Medications:  Reconciled Home Medications:      Medication List        CONTINUE taking these medications      acetaminophen 500 MG tablet  Commonly known as: TYLENOL  Take 1 tablet (500 mg total) by mouth every 6 (six) hours as needed for Pain (alternate with ibuprofen).     amLODIPine 10 MG tablet  Commonly known as: NORVASC  Take 1 tablet (10 mg total) by mouth once daily.     ibuprofen 400 MG tablet  Commonly known as: ADVIL,MOTRIN  Take 1 tablet (400 mg total) by mouth every 6 (six) hours as needed for Other (pain). Alternate with tylenol     LIDOcaine-prilocaine cream  Commonly known as: EMLA  Apply topically as needed (Apply one hour before treatment).     ondansetron 4 MG tablet  Commonly known as: ZOFRAN  Take 1 tablet (4 mg total) by mouth every 8 (eight) hours as needed for Nausea.     oxyCODONE 5 MG immediate release tablet  Commonly known as: ROXICODONE  Take 1 tablet (5 mg total) by mouth every 6 (six) hours as needed for Pain.              Elsy Kelly NP  Colorectal Surgery  [facility]

## 2022-09-21 NOTE — PLAN OF CARE
Pt is A&Ox4 injury free breathing regular equal and unlabored bilaterally room air. Pt had no complaints of pain during the night

## 2022-09-21 NOTE — PLAN OF CARE
[facility]  Discharge Final Note    Primary Care Provider: Renetta García MD    Expected Discharge Date: 9/21/2022    Final Discharge Note (most recent)       Final Note - 09/21/22 1348          Final Note    Assessment Type Final Discharge Note     Anticipated Discharge Disposition Home or Self Care        Post-Acute Status    Post-Acute Authorization Other     Other Status No Post-Acute Service Needs                     Important Message from Medicare  Important Message from Medicare regarding Discharge Appeal Rights: Given to patient/caregiver, Explained to patient/caregiver, Signed/date by patient/caregiver     Date IMM was signed: 09/20/22  Time IMM was signed: 1002    Future Appointments   Date Time Provider Department Center   9/29/2022 10:15 AM SPECIMEN, [facility]ER [facility]G [facility]   9/29/2022 10:30 AM LAB, [facility]ER BLDG [facility]   9/29/2022 11:30 AM Bunny Schuster MD [facility]ONC2 [facility]   9/29/2022 12:30 PM CHEMO 38, [facility]   10/1/2022  1:00 PM INJECTION, [facility] IN[facility]   10/11/2022 11:00 AM LAB, [facility]ER BLDG [facility]   10/11/2022 11:10 AM SPECIMEN, [facility]ER BLDG [facility]   10/12/2022  8:30 AM [facility]   10/12/2022  9:40 AM EDILMA [facility]illa [facility]   10/12/2022 10:00 AM CHAIR 15, [facility] B[facility] INF [facility] INF [facility]   10/14/2022  6:00 PM NURSE 6, [facility] CHEMO [facility]     Azeb Aburto RN, CM   Ext: [phone]

## 2022-09-22 ENCOUNTER — PATIENT OUTREACH (OUTPATIENT)
Dept: ADMINISTRATIVE | Facility: CLINIC | Age: 72
End: 2022-09-22
Payer: MEDICARE

## 2022-09-25 NOTE — PROGRESS NOTES
Pt seen in follow up of stage IV colon CA  Denies any abd pain or N/V  Tolerating diet    For laparoscopy today for staging  No indication of large bowel obstruction  Likely will need combined resection if no evidence of disseminated disease

## 2022-09-26 NOTE — PHYSICIAN QUERY
PT Name: Javier Downs  MR #: [id number]     DOCUMENTATION CLARIFICATION     CDS: Renetta RAMIRES RN  Contact information: [email or web address]  This form is a permanent document in the medical record.     Query Date: September 26, 2022    By submitting this query, we are merely seeking further clarification of documentation to reflect the severity of illness of your patient. Please utilize your independent clinical judgment when addressing the question(s) below.    The medical record reflects the following:     Indicators   Supporting Clinical Findings Location in Medical Record   X Bowel obstruction, intestinal obstruction, LBO or SBO documented 72-year-old male currently being treated for metastatic colon cancer to the liver. Has been on a combination of Avastin and Folfox (last 08/18/22) and received a transverse colon stent 04/2021 as well as a diagnostic lap to rule out carcinomatosis 09/07/22. Now with at least a partial large bowel obstruction at the level of his colonic stent with proximal colon and small bowel dilation.  Consults Colorectal Surg 9/15/2022   X Radiology findings  In this patient with a known history of metastatic colon cancer, there is redemonstration of a colonic stent within the transverse colon.  There is abnormal soft tissue attenuation again demonstrated within the stent lumen which appears to result in significant narrowing.  Additionally, there is dilatation of the more proximal ascending colon and cecum as well as loops of distal small bowel within the abdomen and pelvis, progressed compared to prior study of 08/26/2022.  Findings are suggestive of delayed transit/partial obstruction secondary to underlying in-stent narrowing/stenosis.  Appropriate subspecialty consultation advised.    Has seen marked improvement with return of bowel function, KUB showed nonobstructive gas pattern. Cytology showed no malignancy and reactive mesothelial cells.  CT Abd Pelvis  9/15/2022                        PN Colorectal Surg 9/17/2022   X Treatment/Medication -NPO  -NGT decompression  -fluids  -PRN pain and nausea meds    Miralax has been beneficial, pt reporting BMs Consults Colorectal Surg 9/15/2022        PN Colorectal Surg 9/20/2022    Procedure/Surgery      Other       Provider, please further specify the bowel obstruction diagnosis:    [   ] Partial or incomplete intestinal obstruction, due to adhesions   [  x ] Partial or incomplete intestinal obstruction, due to neoplasm   [   ] Partial or incomplete intestinal obstruction, due to other (please specify):                   [   ] Stent stenosis                   [   ] Other clarification: ___________________     [   ] Partial or incomplete intestinal obstruction, unknown or unspecified etiology   [   ] Other intestinal condition (please specify): _____________________   [   ]  Clinically Undetermined           Please document in your progress notes daily for the duration of treatment until resolved, and include in your discharge summary.

## 2022-09-27 ENCOUNTER — TELEPHONE (OUTPATIENT)
Dept: HEMATOLOGY/ONCOLOGY | Facility: CLINIC | Age: 72
End: 2022-09-27
Payer: MEDICARE

## 2022-09-28 ENCOUNTER — TELEPHONE (OUTPATIENT)
Dept: SURGERY | Facility: CLINIC | Age: 72
End: 2022-09-28
Payer: MEDICARE

## 2022-09-28 NOTE — TELEPHONE ENCOUNTER
Left message I was calling about surgery and I need to give pt instructions. Asked that either Javier or Carrie call me back tomorrow sometime after lunch.

## 2022-09-29 ENCOUNTER — OFFICE VISIT (OUTPATIENT)
Dept: HEMATOLOGY/ONCOLOGY | Facility: CLINIC | Age: 72
End: 2022-09-29
Payer: MEDICARE

## 2022-09-29 ENCOUNTER — LAB VISIT (OUTPATIENT)
Dept: LAB | Facility: HOSPITAL | Age: 72
End: 2022-09-29
Attending: INTERNAL MEDICINE
Payer: MEDICARE

## 2022-09-29 VITALS
HEIGHT: 65 IN | RESPIRATION RATE: 18 BRPM | TEMPERATURE: 99 F | WEIGHT: 115.88 LBS | SYSTOLIC BLOOD PRESSURE: 108 MMHG | HEART RATE: 74 BPM | DIASTOLIC BLOOD PRESSURE: 71 MMHG | BODY MASS INDEX: 19.31 KG/M2 | OXYGEN SATURATION: 100 %

## 2022-09-29 DIAGNOSIS — C78.7 METASTATIC COLON CANCER TO LIVER: Primary | ICD-10-CM

## 2022-09-29 DIAGNOSIS — I10 PRIMARY HYPERTENSION: ICD-10-CM

## 2022-09-29 DIAGNOSIS — C18.9 METASTATIC COLON CANCER TO LIVER: Primary | ICD-10-CM

## 2022-09-29 DIAGNOSIS — T45.1X5A ANEMIA ASSOCIATED WITH CHEMOTHERAPY: ICD-10-CM

## 2022-09-29 DIAGNOSIS — C78.7 METASTATIC COLON CANCER TO LIVER: ICD-10-CM

## 2022-09-29 DIAGNOSIS — Z79.899 IMMUNODEFICIENCY DUE TO CHEMOTHERAPY: ICD-10-CM

## 2022-09-29 DIAGNOSIS — D64.81 ANEMIA ASSOCIATED WITH CHEMOTHERAPY: ICD-10-CM

## 2022-09-29 DIAGNOSIS — C18.9 METASTATIC COLON CANCER TO LIVER: ICD-10-CM

## 2022-09-29 DIAGNOSIS — D84.821 IMMUNODEFICIENCY DUE TO CHEMOTHERAPY: ICD-10-CM

## 2022-09-29 DIAGNOSIS — T45.1X5A IMMUNODEFICIENCY DUE TO CHEMOTHERAPY: ICD-10-CM

## 2022-09-29 DIAGNOSIS — N17.9 AKI (ACUTE KIDNEY INJURY): ICD-10-CM

## 2022-09-29 DIAGNOSIS — R80.9 PROTEINURIA, UNSPECIFIED TYPE: ICD-10-CM

## 2022-09-29 LAB
ALBUMIN SERPL BCP-MCNC: 2.7 G/DL (ref 3.5–5.2)
ALP SERPL-CCNC: 98 U/L (ref 55–135)
ALT SERPL W/O P-5'-P-CCNC: 12 U/L (ref 10–44)
ANION GAP SERPL CALC-SCNC: 10 MMOL/L (ref 8–16)
AST SERPL-CCNC: 27 U/L (ref 10–40)
BASOPHILS # BLD AUTO: 0.08 K/UL (ref 0–0.2)
BASOPHILS NFR BLD: 0.9 % (ref 0–1.9)
BILIRUB SERPL-MCNC: 0.8 MG/DL (ref 0.1–1)
BUN SERPL-MCNC: 10 MG/DL (ref 8–23)
CALCIUM SERPL-MCNC: 9.6 MG/DL (ref 8.7–10.5)
CEA SERPL-MCNC: 10.5 NG/ML (ref 0–5)
CHLORIDE SERPL-SCNC: 99 MMOL/L (ref 95–110)
CO2 SERPL-SCNC: 27 MMOL/L (ref 23–29)
CREAT SERPL-MCNC: 0.9 MG/DL (ref 0.5–1.4)
DIFFERENTIAL METHOD: ABNORMAL
EOSINOPHIL # BLD AUTO: 0.2 K/UL (ref 0–0.5)
EOSINOPHIL NFR BLD: 2.6 % (ref 0–8)
ERYTHROCYTE [DISTWIDTH] IN BLOOD BY AUTOMATED COUNT: 15.1 % (ref 11.5–14.5)
EST. GFR  (NO RACE VARIABLE): >60 ML/MIN/1.73 M^2
GLUCOSE SERPL-MCNC: 91 MG/DL (ref 70–110)
HCT VFR BLD AUTO: 40.7 % (ref 40–54)
HGB BLD-MCNC: 13 G/DL (ref 14–18)
IMM GRANULOCYTES # BLD AUTO: 0.02 K/UL (ref 0–0.04)
IMM GRANULOCYTES NFR BLD AUTO: 0.2 % (ref 0–0.5)
LYMPHOCYTES # BLD AUTO: 3.7 K/UL (ref 1–4.8)
LYMPHOCYTES NFR BLD: 39.4 % (ref 18–48)
MCH RBC QN AUTO: 29.3 PG (ref 27–31)
MCHC RBC AUTO-ENTMCNC: 31.9 G/DL (ref 32–36)
MCV RBC AUTO: 92 FL (ref 82–98)
MONOCYTES # BLD AUTO: 1.3 K/UL (ref 0.3–1)
MONOCYTES NFR BLD: 13.8 % (ref 4–15)
NEUTROPHILS # BLD AUTO: 4.1 K/UL (ref 1.8–7.7)
NEUTROPHILS NFR BLD: 43.1 % (ref 38–73)
NRBC BLD-RTO: 0 /100 WBC
PLATELET # BLD AUTO: 346 K/UL (ref 150–450)
PMV BLD AUTO: 9.8 FL (ref 9.2–12.9)
POTASSIUM SERPL-SCNC: 4.3 MMOL/L (ref 3.5–5.1)
PROT SERPL-MCNC: 7.3 G/DL (ref 6–8.4)
RBC # BLD AUTO: 4.44 M/UL (ref 4.6–6.2)
SODIUM SERPL-SCNC: 136 MMOL/L (ref 136–145)
WBC # BLD AUTO: 9.41 K/UL (ref 3.9–12.7)

## 2022-09-29 PROCEDURE — 99213 OFFICE O/P EST LOW 20 MIN: CPT | Mod: PBBFAC | Performed by: REGISTERED NURSE

## 2022-09-29 PROCEDURE — 85025 COMPLETE CBC W/AUTO DIFF WBC: CPT | Performed by: INTERNAL MEDICINE

## 2022-09-29 PROCEDURE — 99999 PR PBB SHADOW E&M-EST. PATIENT-LVL III: ICD-10-PCS | Mod: PBBFAC,,, | Performed by: REGISTERED NURSE

## 2022-09-29 PROCEDURE — 99215 PR OFFICE/OUTPT VISIT, EST, LEVL V, 40-54 MIN: ICD-10-PCS | Mod: S$PBB,,, | Performed by: REGISTERED NURSE

## 2022-09-29 PROCEDURE — 82378 CARCINOEMBRYONIC ANTIGEN: CPT | Performed by: INTERNAL MEDICINE

## 2022-09-29 PROCEDURE — 99215 OFFICE O/P EST HI 40 MIN: CPT | Mod: S$PBB,,, | Performed by: REGISTERED NURSE

## 2022-09-29 PROCEDURE — 80053 COMPREHEN METABOLIC PANEL: CPT | Performed by: INTERNAL MEDICINE

## 2022-09-29 PROCEDURE — 99999 PR PBB SHADOW E&M-EST. PATIENT-LVL III: CPT | Mod: PBBFAC,,, | Performed by: REGISTERED NURSE

## 2022-09-29 NOTE — PROGRESS NOTES
"[facility]  [facility]  Hematology/Medical Oncology Clinic       PATIENT: Javier Downs  MRN: [id number]  DATE: 9/29/2022    Diagnosis: Transverse colon metastatic cancer to the liver     Oncological history:    04/20/2021: metastatic colon cancer to the liver, moderately differentiated, pMMR   05/06/2021: C1 mFOLFOX + Pema   05/20/2021: C2 mFOLFOX + Pema   06/03/2021: C3 mFOLFOX + Pema    06/17/2021: C4 mFOLFOX + Pema   07/01/2021: C5 mFOLFOX + Pema   07/15/2021: C6 mFOLFOX + Pema   Restaging CT CAP: significant improvement of lesions    07/29/2021: C7 mFOLFOX + Pema    08/12/2021: Switched to maintenance  5FU+Pema (C8)   06/23/2022: C30 maintenance 5FU+Pema    Oncological History copied from medical chart:   Mr. Downs is a 72 y.o. male   70 years old pleasant AA gentleman, with history of hypertension, presenting with two weeks duration of abdominal cramps, diarrhea, nausea and vomiting. His symptoms started gradually with intermittent generalize crampy abdominal pain, worse with food. That was associated with nausea, reflux and occaisonal vomiting. He tried pepto-bismol and imodium with no relief, and hence he presented to ER.   He lost significant amount of weight, but cannot quantify the amount or the time period. He has also been feeling weaker than usual.   He hasn't seen a healthcare provider in over than 10 years. He has never had a colonoscopy.   In the ER. His labs were significant of microcytic anemia of 9.7 g/dl, MCV 77, and Platelets counts of 495. CT of the abdomen and pelvis showed " Large mass in the transverse colon highly suspicious for adenocarcinoma resulting in at least high-grade partial obstruction with dilation of proximal colon and small bowel. Soft tissue nodules in the adjacent mesentery are suspicious for pily metastases and/or mesenteric implants.  Numerous hepatic masses measuring up to 11.2 cm most likely related to metastatic disease.  There also several " "small subcapsular lesions which could reflect additional metastatic disease"   CT chest was negative to metastatic disease.   He underwent colonoscopy and stent placement. 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 HER 2 negative, VIRI/SHERI NGS was cancelled due to insufficient quantity   Guardant 360 was sent, negative for KRAS, NRAS, BRAF     He started palliative chemotherapy with FOLFOX+Pema     Restaging scans on 07/22/2021 showed significant decrease in size trasverse colon mass as well liver metastatic lesion.   He was switched to maintenance 5FU+Pema from C8    Restaging scans from 10/05/2021 (after C11) showing stable-mildly improved liver mets.   Restaging scans from 12/28/2021 (after C17) showing stable disease.   Restaging scans from 03/22/22 show stable disease.     MRI on 7/18/22    Impression:     Slight increase in size of the large hepatic metastasis when accounting for differences in imaging technique.  No new disease.     Remaining 2 liver lesions remain stable in size.  The larger lesion in the left hepatic lobe has features typical of a hemangioma.  Lesion at the hepatic dome is difficult to fully characterize due to location and breathing motion artifact, though may represent a hemangioma as well.  This can be followed.     Additional stable chronic findings as above.    Interval History:   He presents today with his daughter prior to cycle 37 of FOLFOX + Avastin. He feels well without specific complaints. Energy stable. Scheduled for right hemicolectomy on 10/13/2022 with Dr. Bonilla. He is on a liquid diet with Boost to prepare for surgery. Taking a laxative to help with constipation. No new or worsening pain. Denies paresthesias. Tolerated addition of oxaliplatin well. Denies fever/chills, SOB, CP, palpitations, N/V/D, blood in urine/stool. "     Presents with his daughter today. ECOG status is 1.       Past Medical History:   Past Medical History:   Diagnosis Date    MARIA A (acute kidney injury) 8/18/2022    Hypertension     Metastatic colon cancer to liver 4/22/2021       Past Surgical HIstory:   Past Surgical History:   Procedure Laterality Date    COLONOSCOPY N/A 4/20/2021    Procedure: COLONOSCOPY with possible stent;  Surgeon: EDILMA Bonilla MD;  Location: [facility] ENDO (2ND FLR);  Service: Colon and Rectal;  Laterality: N/A;    DIAGNOSTIC LAPAROSCOPY N/A 9/7/2022    Procedure: LAPAROSCOPY, DIAGNOSTIC;  Surgeon: Adrian Rodriguez MD;  Location: [facility] OR 2ND FLR;  Service: General;  Laterality: N/A;    INSERTION OF TUNNELED CENTRAL VENOUS CATHETER (CVC) WITH SUBCUTANEOUS PORT N/A 5/3/2021    Procedure: [id number]-CATH;  Surgeon: Francisco Layton MD;  Location: [facility] OR 2ND FLR;  Service: Vascular;  Laterality: N/A;       Family History: History reviewed. No pertinent family history.    Social History:  reports that he has never smoked. He has never used smokeless tobacco. He reports current alcohol use.    Allergies:  Review of patient's allergies indicates:  No Known Allergies    Medications:  Current Outpatient Medications   Medication Sig Dispense Refill    acetaminophen (TYLENOL) 500 MG tablet Take 1 tablet (500 mg total) by mouth every 6 (six) hours as needed for Pain (alternate with ibuprofen).  0    amLODIPine (NORVASC) 10 MG tablet Take 1 tablet (10 mg total) by mouth once daily. 90 tablet 3    ibuprofen (ADVIL,MOTRIN) 400 MG tablet Take 1 tablet (400 mg total) by mouth every 6 (six) hours as needed for Other (pain). Alternate with tylenol      LIDOcaine-prilocaine (EMLA) cream Apply topically as needed (Apply one hour before treatment). 30 g 5    ondansetron (ZOFRAN) 4 MG tablet Take 1 tablet (4 mg total) by mouth every 8 (eight) hours as needed for Nausea. 30 tablet 3    oxyCODONE (ROXICODONE) 5 MG immediate release tablet Take 1 tablet (5  "mg total) by mouth every 6 (six) hours as needed for Pain. 5 tablet 0     Current Facility-Administered Medications   Medication Dose Route Frequency Provider Last Rate Last Admin    sars-cov-2 (covid-19) 30 mcg/0.3 ml injection 0.3 mL  0.3 mL Intramuscular 1 time in Clinic/HOD Idania Rock LPN           Review of Systems   Constitutional:  Negative for activity change, appetite change, fatigue, fever and unexpected weight change.   HENT:  Negative for congestion, mouth sores, nosebleeds, trouble swallowing and voice change.    Eyes:  Negative for pain and visual disturbance.   Respiratory:  Negative for cough, chest tightness, shortness of breath and wheezing.    Cardiovascular:  Negative for chest pain, palpitations and leg swelling.   Gastrointestinal:  Negative for abdominal pain, blood in stool, constipation, diarrhea, nausea and vomiting.   Genitourinary:  Negative for difficulty urinating, flank pain, frequency, hematuria and urgency.   Musculoskeletal:  Negative for arthralgias, back pain and myalgias.   Skin:  Negative for rash and wound.   Neurological:  Negative for dizziness, weakness, numbness and headaches.   Psychiatric/Behavioral:  Negative for agitation, behavioral problems and confusion. The patient is not nervous/anxious.      ECOG Performance Status: 1   Objective:      Vitals:   Vitals:    09/29/22 1053   BP: 108/71   BP Location: Left arm   Patient Position: Sitting   BP Method: Small (Automatic)   Pulse: 74   Resp: 18   Temp: 98.5 °F (36.9 °C)   TempSrc: Oral   SpO2: 100%   Weight: 52.6 kg (115 lb 13.6 oz)   Height: 5' 5" (1.651 m)     Physical Exam  Constitutional:       General: He is not in acute distress.     Appearance: Normal appearance. He is normal weight. He is not ill-appearing, toxic-appearing or diaphoretic.   HENT:      Head: Normocephalic and atraumatic.      Right Ear: External ear normal.      Left Ear: External ear normal.   Eyes:      General: No scleral icterus.     " Extraocular Movements: Extraocular movements intact.      Conjunctiva/sclera: Conjunctivae normal.      Pupils: Pupils are equal, round, and reactive to light.   Cardiovascular:      Rate and Rhythm: Normal rate and regular rhythm.      Pulses: Normal pulses.      Heart sounds: Normal heart sounds. No murmur heard.  Pulmonary:      Effort: Pulmonary effort is normal. No respiratory distress.      Breath sounds: Normal breath sounds. No wheezing.   Abdominal:      General: Abdomen is flat. Bowel sounds are normal. There is no distension.      Palpations: Abdomen is soft. There is no mass.      Tenderness: There is no abdominal tenderness.   Musculoskeletal:         General: No swelling or deformity. Normal range of motion.      Cervical back: Normal range of motion. No rigidity.   Lymphadenopathy:      Cervical: No cervical adenopathy.   Skin:     Coloration: Skin is not jaundiced or pale.      Findings: No bruising or rash.   Neurological:      General: No focal deficit present.      Mental Status: He is alert and oriented to person, place, and time. Mental status is at baseline.      Cranial Nerves: No cranial nerve deficit.      Sensory: No sensory deficit.      Motor: No weakness.      Gait: Gait normal.   Psychiatric:         Mood and Affect: Mood normal.         Behavior: Behavior normal.         Thought Content: Thought content normal.         Judgment: Judgment normal.     Laboratory Data:  Lab Visit on 09/29/2022   Component Date Value Ref Range Status    Specimen UA 09/29/2022 Urine, Unspecified   Final    Color, UA 09/29/2022 Yellow  Yellow, Straw, Marixa Final    Appearance, UA 09/29/2022 Hazy (A)  Clear Final    pH, UA 09/29/2022 6.0  5.0 - 8.0 Final    Specific Gravity, UA 09/29/2022 1.030  1.005 - 1.030 Final    Protein, UA 09/29/2022 1+ (A)  Negative Final    Comment: Recommend a 24 hour urine protein or a urine   protein/creatinine ratio if globulin induced proteinuria is  clinically suspected.       Glucose, UA 09/29/2022 Negative  Negative Final    Ketones, UA 09/29/2022 Trace (A)  Negative Final    Bilirubin (UA) 09/29/2022 Negative  Negative Final    Occult Blood UA 09/29/2022 Negative  Negative Final    Nitrite, UA 09/29/2022 Negative  Negative Final    Leukocytes, UA 09/29/2022 1+ (A)  Negative Final    RBC, UA 09/29/2022 1  0 - 4 /hpf Final    WBC, UA 09/29/2022 14 (H)  0 - 5 /hpf Final    Bacteria 09/29/2022 Few (A)  None-Occ /hpf Final    Hyaline Casts, UA 09/29/2022 0  0-1/lpf /lpf Final    Microscopic Comment 09/29/2022 SEE COMMENT   Final    Comment: Other formed elements not mentioned in the report are not   present in the microscopic examination.      Lab Visit on 09/29/2022   Component Date Value Ref Range Status    WBC 09/29/2022 9.41  3.90 - 12.70 K/uL Final    RBC 09/29/2022 4.44 (L)  4.60 - 6.20 M/uL Final    Hemoglobin 09/29/2022 13.0 (L)  14.0 - 18.0 g/dL Final    Hematocrit 09/29/2022 40.7  40.0 - 54.0 % Final    MCV 09/29/2022 92  82 - 98 fL Final    MCH 09/29/2022 29.3  27.0 - 31.0 pg Final    MCHC 09/29/2022 31.9 (L)  32.0 - 36.0 g/dL Final    RDW 09/29/2022 15.1 (H)  11.5 - 14.5 % Final    Platelets 09/29/2022 346  150 - 450 K/uL Final    MPV 09/29/2022 9.8  9.2 - 12.9 fL Final    Immature Granulocytes 09/29/2022 0.2  0.0 - 0.5 % Final    Gran # (ANC) 09/29/2022 4.1  1.8 - 7.7 K/uL Final    Immature Grans (Abs) 09/29/2022 0.02  0.00 - 0.04 K/uL Final    Comment: Mild elevation in immature granulocytes is non specific and   can be seen in a variety of conditions including stress response,   acute inflammation, trauma and pregnancy. Correlation with other   laboratory and clinical findings is essential.      Lymph # 09/29/2022 3.7  1.0 - 4.8 K/uL Final    Mono # 09/29/2022 1.3 (H)  0.3 - 1.0 K/uL Final    Eos # 09/29/2022 0.2  0.0 - 0.5 K/uL Final    Baso # 09/29/2022 0.08  0.00 - 0.20 K/uL Final    nRBC 09/29/2022 0  0 /100 WBC Final    Gran % 09/29/2022 43.1  38.0 - 73.0 % Final     Lymph % 09/29/2022 39.4  18.0 - 48.0 % Final    Mono % 09/29/2022 13.8  4.0 - 15.0 % Final    Eosinophil % 09/29/2022 2.6  0.0 - 8.0 % Final    Basophil % 09/29/2022 0.9  0.0 - 1.9 % Final    Differential Method 09/29/2022 Automated   Final    Sodium 09/29/2022 136  136 - 145 mmol/L Final    Potassium 09/29/2022 4.3  3.5 - 5.1 mmol/L Final    Chloride 09/29/2022 99  95 - 110 mmol/L Final    CO2 09/29/2022 27  23 - 29 mmol/L Final    Glucose 09/29/2022 91  70 - 110 mg/dL Final    BUN 09/29/2022 10  8 - 23 mg/dL Final    Creatinine 09/29/2022 0.9  0.5 - 1.4 mg/dL Final    Calcium 09/29/2022 9.6  8.7 - 10.5 mg/dL Final    Total Protein 09/29/2022 7.3  6.0 - 8.4 g/dL Final    Albumin 09/29/2022 2.7 (L)  3.5 - 5.2 g/dL Final    Total Bilirubin 09/29/2022 0.8  0.1 - 1.0 mg/dL Final    Comment: For infants and newborns, interpretation of results should be based  on gestational age, weight and in agreement with clinical  observations.    Premature Infant recommended reference ranges:  Up to 24 hours.............<8.0 mg/dL  Up to 48 hours............<12.0 mg/dL  3-5 days..................<15.0 mg/dL  6-29 days.................<15.0 mg/dL      Alkaline Phosphatase 09/29/2022 98  55 - 135 U/L Final    AST 09/29/2022 27  10 - 40 U/L Final    ALT 09/29/2022 12  10 - 44 U/L Final    Anion Gap 09/29/2022 10  8 - 16 mmol/L Final    eGFR 09/29/2022 >60.0  >60 mL/min/1.73 m^2 Final    CEA 09/29/2022 10.5 (H)  0.0 - 5.0 ng/mL Final    Comment: CEA Normal Range:  Non-Smokers: 0-3.0 ng/mL  Smokers:     0-5.0 ng/mL    The testing method is a chemiluminescent microparticle immunoassay   manufactured by [employer] and performed on the [employer]   or   [employer] system. Values obtained with different assay manufacturers   for   methods may be different and cannot be used interchangeably.     CEA stable at 10.5.      Assessment and Plan        1. 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  Primary hypertension    4. MARIA A (acute kidney injury)    5. Proteinuria, unspecified type    6. Anemia associated with chemotherapy        1,2.  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  Guardant 360 was negative for BRAF, VIRI, HER2 amplification.   Pretreatment CEA 57.  We had a long and sonia discussion with him about his diagnosis. Unfortunately, the disease is not curable but remains treatable and he has good performance status.   Restaging scans after 7 cycles showed significant reduction in colonic mass and liver mass.   we switched to maintenance as of cycle 8 with 5FU + Pema  Restaging scans from March 2022 show stable disease.   MRI on 7/18/22 showing hepatic progression of disease in the right hepatic lobe noted on the exam. CT CAP on 8/26 shows some mild progression in both liver metastases.    Discussed case at colorectal tumor board 8/31/22 and had a diagnostic lap performed by Dr. Rodriguez on 9/7 to assess for any occult peritoneal disease. Findings from the diagnostic lap were favorable, given that it was negative for omental thickening or with implants at the base of the small bowel mesentery. Fluid from around from around the primary mass was sent for cytology, which is pending. If the cytology is negative, will consider liver metastasectomy and resection of primary tumor with Dr. Bonilla. Therefore, we will hold off on Y-90 treatment. This was discussed with the patient and daughter who is in agreement.     Will await the results of the cytology. Meanwhile given mild progression on scans, we added back oxaliplatin with the last cycle and held Avastin due to his procedure on 9/7/2022. He tolerated this well without complication.     Doing well today  Proceed with FOLFOX today. Continue to hold avastin d/t upcoming surgery.   RTC in 2 weeks for next cycle.    3. Hypertension   --Well Controlled. Will hold Avastin for now d/t upcoming surgery.   --continue with  Amlodipine .   - following with PCP.     4. MARIA A, proteinuria  --Improved renal function. Cr 0.9.  - Holding Avastin for now, given that he is a candidate for surgical resection.   -- Monitor U/A.    5. Anemia  -- Mild. Slightly improved.   -- Monitor.    Patient is in agreement with the proposed treatment plan. All questions were answered to the patient's satisfaction. Pt knows to call clinic if anything is needed before the next clinic visit.      Natividad Maher, MSN, APRN, [facility]  Hematology and Medical Oncology  Clinical Nurse Specialist to Dr. Schuster, Dr. Quijano & Dr. Mueller          Route Chart for Scheduling    Med Onc Chart Routing      Follow up with physician 2 weeks. for FOLFOX +/- IVFs   Follow up with RACHEL    Infusion scheduling note    Injection scheduling note    Labs CBC, CMP, CEA and urinalysis   Lab interval: every 2 weeks     Imaging    Pharmacy appointment    Other referrals        Treatment Plan Information   OP COLORECTAL FOLFOX + BEVACIZUMAB Q2W   Torres Pantoja MD   Upcoming Treatment Dates - OP COLORECTAL FOLFOX + BEVACIZUMAB Q2W    9/15/2022       Chemotherapy       oxaliplatin (ELOXATIN) 85 mg/m2 = 138 mg in dextrose 5 % chemo infusion       fluorouraciL 2,400 mg/m2 = 3,890 mg in sodium chloride 0.9% 100 mL chemo infusion       Antiemetics       palonosetron (ALOXI) 0.25 mg with Dexamethasone (DECADRON) 12 mg in NS 50 mL IVPB  9/29/2022       Chemotherapy       oxaliplatin (ELOXATIN) in dextrose 5 % 500 mL chemo infusion       fluorouraciL in sodium chloride 0.9% 100 mL chemo infusion       Antiemetics       palonosetron (ALOXI) 0.25 mg with Dexamethasone (DECADRON) 12 mg in NS 50 mL IVPB

## 2022-09-30 ENCOUNTER — TELEPHONE (OUTPATIENT)
Dept: SURGERY | Facility: CLINIC | Age: 72
End: 2022-09-30
Payer: MEDICARE

## 2022-09-30 NOTE — TELEPHONE ENCOUNTER
----- Message from Kristine Menjivar sent at 9/30/2022  9:46 AM CDT -----  Contact: pts daughter  Pt is trying to find out the time for the procedure on 10/13 with Dr. Bonilla. I informed her of the typical call about 24 hours before procedure, but shed like to speak to someone in the office.     Confirmed contact below:  Contact Name:Javier Downs  Phone Number: [phone]

## 2022-09-30 NOTE — TELEPHONE ENCOUNTER
Spoke with patient's daughter, informed her that she will be notified of the time of arrival the day prior to surgery. Daughter verbalized understanding.

## 2022-10-02 ENCOUNTER — HOSPITAL ENCOUNTER (EMERGENCY)
Facility: HOSPITAL | Age: 72
Discharge: HOME | End: 2022-10-02
Attending: EMERGENCY MEDICINE
Payer: MEDICARE

## 2022-10-02 VITALS
RESPIRATION RATE: 17 BRPM | HEART RATE: 75 BPM | WEIGHT: 112.63 LBS | BODY MASS INDEX: 18.77 KG/M2 | SYSTOLIC BLOOD PRESSURE: 114 MMHG | OXYGEN SATURATION: 96 % | TEMPERATURE: 98 F | HEIGHT: 65 IN | DIASTOLIC BLOOD PRESSURE: 70 MMHG

## 2022-10-02 DIAGNOSIS — C78.7 METASTATIC COLON CANCER TO LIVER: ICD-10-CM

## 2022-10-02 DIAGNOSIS — C18.9 METASTATIC COLON CANCER TO LIVER: ICD-10-CM

## 2022-10-02 DIAGNOSIS — R10.33 PERIUMBILICAL ABDOMINAL PAIN: Primary | ICD-10-CM

## 2022-10-02 LAB
ALBUMIN SERPL BCP-MCNC: 2.1 G/DL (ref 3.5–5.2)
ALP SERPL-CCNC: 78 U/L (ref 55–135)
ALT SERPL W/O P-5'-P-CCNC: 8 U/L (ref 10–44)
ANION GAP SERPL CALC-SCNC: 11 MMOL/L (ref 8–16)
AST SERPL-CCNC: 18 U/L (ref 10–40)
BASOPHILS # BLD AUTO: 0.02 K/UL (ref 0–0.2)
BASOPHILS NFR BLD: 0.3 % (ref 0–1.9)
BILIRUB SERPL-MCNC: 0.7 MG/DL (ref 0.1–1)
BUN SERPL-MCNC: 12 MG/DL (ref 8–23)
CALCIUM SERPL-MCNC: 8.2 MG/DL (ref 8.7–10.5)
CHLORIDE SERPL-SCNC: 101 MMOL/L (ref 95–110)
CO2 SERPL-SCNC: 23 MMOL/L (ref 23–29)
CREAT SERPL-MCNC: 0.7 MG/DL (ref 0.5–1.4)
DIFFERENTIAL METHOD: ABNORMAL
EOSINOPHIL # BLD AUTO: 0 K/UL (ref 0–0.5)
EOSINOPHIL NFR BLD: 0.1 % (ref 0–8)
ERYTHROCYTE [DISTWIDTH] IN BLOOD BY AUTOMATED COUNT: 14.7 % (ref 11.5–14.5)
EST. GFR  (NO RACE VARIABLE): >60 ML/MIN/1.73 M^2
GLUCOSE SERPL-MCNC: 103 MG/DL (ref 70–110)
HCT VFR BLD AUTO: 40.9 % (ref 40–54)
HGB BLD-MCNC: 13.1 G/DL (ref 14–18)
IMM GRANULOCYTES # BLD AUTO: 0.02 K/UL (ref 0–0.04)
IMM GRANULOCYTES NFR BLD AUTO: 0.3 % (ref 0–0.5)
LYMPHOCYTES # BLD AUTO: 1 K/UL (ref 1–4.8)
LYMPHOCYTES NFR BLD: 13.9 % (ref 18–48)
MCH RBC QN AUTO: 28.7 PG (ref 27–31)
MCHC RBC AUTO-ENTMCNC: 32 G/DL (ref 32–36)
MCV RBC AUTO: 90 FL (ref 82–98)
MONOCYTES # BLD AUTO: 0.8 K/UL (ref 0.3–1)
MONOCYTES NFR BLD: 11.2 % (ref 4–15)
NEUTROPHILS # BLD AUTO: 5.2 K/UL (ref 1.8–7.7)
NEUTROPHILS NFR BLD: 74.2 % (ref 38–73)
NRBC BLD-RTO: 0 /100 WBC
PLATELET # BLD AUTO: 477 K/UL (ref 150–450)
PMV BLD AUTO: 9.6 FL (ref 9.2–12.9)
POTASSIUM SERPL-SCNC: 3.9 MMOL/L (ref 3.5–5.1)
PROT SERPL-MCNC: 5.8 G/DL (ref 6–8.4)
RBC # BLD AUTO: 4.56 M/UL (ref 4.6–6.2)
SODIUM SERPL-SCNC: 135 MMOL/L (ref 136–145)
WBC # BLD AUTO: 7.03 K/UL (ref 3.9–12.7)

## 2022-10-02 PROCEDURE — 96374 THER/PROPH/DIAG INJ IV PUSH: CPT | Mod: 59

## 2022-10-02 PROCEDURE — 99284 EMERGENCY DEPT VISIT MOD MDM: CPT | Mod: ,,, | Performed by: EMERGENCY MEDICINE

## 2022-10-02 PROCEDURE — 99285 EMERGENCY DEPT VISIT HI MDM: CPT | Mod: 25

## 2022-10-02 PROCEDURE — 25000003 PHARM REV CODE 250: Performed by: EMERGENCY MEDICINE

## 2022-10-02 PROCEDURE — 25500020 PHARM REV CODE 255: Performed by: EMERGENCY MEDICINE

## 2022-10-02 PROCEDURE — 63600175 PHARM REV CODE 636 W HCPCS: Performed by: EMERGENCY MEDICINE

## 2022-10-02 PROCEDURE — 85025 COMPLETE CBC W/AUTO DIFF WBC: CPT | Performed by: EMERGENCY MEDICINE

## 2022-10-02 PROCEDURE — 99284 PR EMERGENCY DEPT VISIT,LEVEL IV: ICD-10-PCS | Mod: ,,, | Performed by: EMERGENCY MEDICINE

## 2022-10-02 PROCEDURE — 80053 COMPREHEN METABOLIC PANEL: CPT | Performed by: EMERGENCY MEDICINE

## 2022-10-02 PROCEDURE — 96361 HYDRATE IV INFUSION ADD-ON: CPT

## 2022-10-02 RX ORDER — HYDROMORPHONE HYDROCHLORIDE 1 MG/ML
1 INJECTION, SOLUTION INTRAMUSCULAR; INTRAVENOUS; SUBCUTANEOUS
Status: COMPLETED | OUTPATIENT
Start: 2022-10-02 | End: 2022-10-02

## 2022-10-02 RX ORDER — OXYCODONE HYDROCHLORIDE 5 MG/1
5 TABLET ORAL EVERY 4 HOURS PRN
Qty: 30 TABLET | Refills: 0 | Status: SHIPPED | OUTPATIENT
Start: 2022-10-02 | End: 2022-10-07

## 2022-10-02 RX ADMIN — IOHEXOL 75 ML: 350 INJECTION, SOLUTION INTRAVENOUS at 03:10

## 2022-10-02 RX ADMIN — SODIUM CHLORIDE 1000 ML: 0.9 INJECTION, SOLUTION INTRAVENOUS at 03:10

## 2022-10-02 RX ADMIN — HYDROMORPHONE HYDROCHLORIDE 1 MG: 1 INJECTION, SOLUTION INTRAMUSCULAR; INTRAVENOUS; SUBCUTANEOUS at 03:10

## 2022-10-02 NOTE — DISCHARGE INSTRUCTIONS
You can use oxycodone up to every 4 hours for pain as needed.  You can also take Tylenol, or ibuprofen, warm compression to help with the pain. Please follow-up with your primary care provider, an oncology within 1 week.  Return to ED if he has severe uncontrolled pain, fever, uncontrolled nausea or vomiting, or other concerns.

## 2022-10-02 NOTE — ED PROVIDER NOTES
Encounter Date: 10/2/2022       History     Chief Complaint   Patient presents with    Abdominal Pain     Hx of colon CA. Last received chemo 2 weeks ago.      72 y.o. M, with history of hypertension, metastatic colon cancer s/p stent on palliative chemo, presents to the ED for acute on chronic abdominal pain. Patient reports that he is currently off chemo, pending surgical evaluation for his colon cancer, since his chemo stopped, the abdominal pain came back like when he first diagnosed with cancer. States that he has been using his oxycodone 5mg every 6 hours as prescribed, the pain med help but the pain kept coming back. Felt like cramping, episodic, around his umbilical area. He's on liquid diet for his surgery eval next week, reports soft watery stool, no blood. Denies fever, chill, nausea or vomiting.       Review of patient's allergies indicates:  No Known Allergies  Past Medical History:   Diagnosis Date    MARIA A (acute kidney injury) 8/18/2022    Hypertension     Metastatic colon cancer to liver 4/22/2021     Past Surgical History:   Procedure Laterality Date    COLONOSCOPY N/A 4/20/2021    Procedure: COLONOSCOPY with possible stent;  Surgeon: EDILMA Bonilla MD;  Location: [address]);  Service: Colon and Rectal;  Laterality: N/A;    DIAGNOSTIC LAPAROSCOPY N/A 9/7/2022    Procedure: LAPAROSCOPY, DIAGNOSTIC;  Surgeon: Adrian Rodriguez MD;  Location: [address];  Service: General;  Laterality: N/A;    INSERTION OF TUNNELED CENTRAL VENOUS CATHETER (CVC) WITH SUBCUTANEOUS PORT N/A 5/3/2021    Procedure: [id number];  Surgeon: Francisco Layton MD;  Location: [address];  Service: Vascular;  Laterality: N/A;     History reviewed. No pertinent family history.  Social History     Tobacco Use    Smoking status: Never    Smokeless tobacco: Never   Substance Use Topics    Alcohol use: Yes     Review of Systems   Constitutional:  Negative for chills and fever.   HENT:  Negative for  congestion and sore throat.    Eyes:  Negative for pain, redness and visual disturbance.   Respiratory:  Negative for shortness of breath.    Cardiovascular:  Negative for chest pain and leg swelling.   Gastrointestinal:  Positive for abdominal pain. Negative for abdominal distention, nausea and vomiting.   Genitourinary:  Negative for dysuria and flank pain.   Musculoskeletal:  Negative for back pain and neck pain.   Skin:  Negative for rash.   Neurological:  Negative for weakness and headaches.   Psychiatric/Behavioral:  Negative for behavioral problems, confusion and decreased concentration.      Physical Exam     Initial Vitals [10/02/22 1431]   BP Pulse Resp Temp SpO2   (!) 99/59 (!) 114 16 97.7 °F (36.5 °C) 99 %      MAP       --         Physical Exam    Nursing note and vitals reviewed.  Constitutional: He appears well-developed. No distress.   HENT:   Head: Normocephalic and atraumatic.   Nose: Nose normal.   Eyes: Conjunctivae and EOM are normal. Pupils are equal, round, and reactive to light.   Neck: No JVD present.   Normal range of motion.  Cardiovascular:  Normal rate, regular rhythm and normal heart sounds.           Pulmonary/Chest: Breath sounds normal. No respiratory distress.   Abdominal: He exhibits no distension and no mass. There is abdominal tenderness. There is no guarding.   Musculoskeletal:         General: No tenderness or edema. Normal range of motion.      Cervical back: Normal range of motion.     Neurological: He is alert and oriented to person, place, and time. He has normal strength. No cranial nerve deficit.   Skin: Skin is warm and dry. Capillary refill takes less than 2 seconds.       ED Course   Procedures  Labs Reviewed   CBC W/ AUTO DIFFERENTIAL - Abnormal; Notable for the following components:       Result Value    RBC 4.56 (*)     Hemoglobin 13.1 (*)     RDW 14.7 (*)     Platelets 477 (*)     Gran % 74.2 (*)     Lymph % 13.9 (*)     All other components within normal limits    COMPREHENSIVE METABOLIC PANEL - Abnormal; Notable for the following components:    Sodium 135 (*)     Calcium 8.2 (*)     Total Protein 5.8 (*)     Albumin 2.1 (*)     ALT 8 (*)     All other components within normal limits     EKG Readings: (Independently Interpreted)   Initial Reading: No STEMI. Rhythm: Sinus Tachycardia. Heart Rate: 102. Ectopy: No Ectopy. Conduction: Normal. ST Segments: Normal ST Segments. T Waves: Normal. Axis: Left Axis Deviation. Clinical Impression: Sinus Tachycardia     Imaging Results               CT Abdomen Pelvis With Contrast (Final result)  Result time 10/02/22 15:53:57      Final result by Serafin Gibson MD (10/02/22 15:53:57)                   Impression:      This report was flagged in Epic as abnormal.    1. In comparison to examination 09/15/2022, there is continued diffuse fluid distention of the cecum and ascending colon to the level of the transverse colonic stent.  Upstream small bowel dilation suggests some degree of obstruction at the level of the stent however the degree of small-bowel distention has decreased since the previous examination.  Continued follow-up is advised.  No findings to suggest pneumatosis or free air.  2. Findings of hepatic metastatic disease, the lesion within the right hepatic lobe appears to have enlarged somewhat since the previous examination versus sequela of slice selection as well as the focus within the right hepatic dome.  Correlation is advised.  3. Focus of low attenuation at the portal splenic confluence, thrombus within the portal vein versus portal vein narrowing by adjacent lymphadenopathy are considerations.  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  4. Please see above for several additional findings.      Electronically signed by: Serafin Gibson MD  Date:    10/02/2022  Time:    15:53               Narrative:    EXAMINATION:  CT ABDOMEN PELVIS WITH  CONTRAST    CLINICAL HISTORY:  Abdominal pain, acute, nonlocalized;    TECHNIQUE:  Low dose axial images, sagittal and coronal reformations were obtained from the lung bases to the pubic symphysis following the IV administration of 75 mL of Omnipaque 350 .  Oral contrast was not given.    COMPARISON:  CT 09/15/2022    FINDINGS:  Images of the lower thorax are remarkable for bilateral dependent atelectasis.  There is fatty eventration of the right hemidiaphragm.    The spleen, pancreas, gallbladder and adrenal glands are grossly unremarkable.  The liver is hypoattenuating suggesting steatosis, correlation with LFTs recommended.  There is a low attenuating lesion within the right hepatic lobe measuring 9 cm, grossly stable to slightly enlarged as compared to the previous exam.  There is a partially enhancing lesion within the periphery of the left hepatic lobe measuring 3.6 cm, grossly stable as compared to the previous exam.  There is a low attenuating lesion within the right hepatic lobe near the dome measuring 1.7 cm, enlarged since the previous exam.  Stomach is distended with ingested liquid.  There is mild thickening of the pyloric region.  There is a low attenuating focus at the level of the portal splenic confluence, possibly reflecting thrombus within the portal vein at the region versus infiltration by lymphadenopathy.  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  The celiac axis and SMA all are patent.  There are several prominent abdominal lymph nodes, grossly similar to the previous exam.    The kidneys enhance symmetrically without hydronephrosis or nephrolithiasis.  There are a few scattered subcentimeter low attenuating lesions within the kidneys bilaterally, too small for characterization.  The bilateral ureters are unremarkable without calculi seen.  The urinary bladder is decompressed noting wall thickening.  The prostate is enlarged.   There is fluid in the pelvis.    There are several scattered colonic diverticula without surrounding inflammation to suggest diverticulitis.  There is a stent within the proximal transverse colon spanning a region of colonic wall thickening without findings to suggest high-grade obstruction.  There is liquid stool within the cecum and ascending colon, the degree of which suggests some degree of stenosis to the stent.  The terminal ileum is prominent as well as several distal small bowel loops suggesting slow flow related to stenosis at the stent.  The appendix is unremarkable.  The small bowel is otherwise grossly unremarkable.  There are several scattered shotty periaortic and paracaval lymph nodes.  Several scattered anterior pelvic varices noted.  No focal organized pelvic fluid collection.    There is osteopenia.  Degenerative changes are noted of the spine and sacroiliac joints.  No significant inguinal lymphadenopathy.                                       Medications   sodium chloride 0.9% bolus 1,000 mL (0 mLs Intravenous Stopped 10/2/22 1606)   HYDROmorphone injection 1 mg (1 mg Intravenous Given 10/2/22 1509)   iohexoL (OMNIPAQUE 350) injection 75 mL (75 mLs Intravenous Given 10/2/22 1541)     Medical Decision Making:   History:   Old Medical Records: I decided to obtain old medical records.  Initial Assessment:   72 y.o. M, with history of hypertension, metastatic colon cancer s/p stent on palliative chemo, presents to the ED for acute on chronic abdominal pain.  Patient has last dose of oxycodone prior to arrival, reports pain decreased, now 4/10 severe ED. he is afebrile, alert and oriented, hemodynamically stable.  Physical exam significant for diffuse abdominal tenderness to palpation, no signs of peritonitis.  Differential Diagnosis:   Malignancy, bowel obstruction, bowel perforation, thrombosis, abscess, constipation.   Clinical Tests:   Lab Tests: Ordered and Reviewed  Radiological Study: Ordered  and Reviewed  Medical Tests: Ordered and Reviewed  ED Management:  Labs result is stable from baseline, no acute finding. CT scan indicated worsening of his malignancy but no acute intraabdominal process. His presentation is likely from his malignancy. His pain is controled with one dose of IV pain med in the Ed, tolerating PO. Since he fail his current home pain med, I decided to increase his oxycodone to q4h, also added tylenol and ibuprofen for multimodal pain control. Patient is stable to discharge home, prescribe oxycodone with new dose adjustment till he follow up with his oncology next week. Provided discharge instruction and return to the ED precaution, no other questions.             Attending Attestation:   Physician Attestation Statement for Resident:  As the supervising MD   Physician Attestation Statement: I have personally seen and examined this patient.   I agree with the above history.  -:   As the supervising MD I agree with the above PE.     As the supervising MD I agree with the above treatment, course, plan, and disposition.                  ED Course as of 10/02/22 1817   Sun Oct 02, 2022   1646 WBC: 7.03 [NC]   1646 Hemoglobin(!): 13.1 [NC]   1646 Platelets(!): 477 [NC]   1646 Sodium(!): 135 [NC]   1646 Potassium: 3.9 [NC]   1646 Creatinine: 0.7 [NC]   1646 BUN: 12 [NC]      ED Course User Index  [NC] Ernesto Peñaloza MD                 Clinical Impression:   Final diagnoses:  [R10.33] Periumbilical abdominal pain (Primary)  [C18.9, C78.7] Metastatic colon cancer to liver        ED Disposition Condition    Discharge Stable          ED Prescriptions       Medication Sig Dispense Start Date End Date Auth. Provider    oxyCODONE (ROXICODONE) 5 MG immediate release tablet Take 1 tablet (5 mg total) by mouth every 4 (four) hours as needed for Pain. 30 tablet 10/2/2022 10/7/2022 Ernesto Peñaloza MD          Follow-up Information       Follow up With Specialties Details Why Contact Info    Renetta García MD  Internal Medicine In 1 week  [address]  [facility] [id number] [phone]      oncology  Schedule an appointment as soon as possible for a visit       [facility] Emergency Medicine  As needed [address]  [facility] [id number] [phone]             Ernesto Peñaloza MD  Resident  10/02/22 2032       Andre Thompson MD  10/03/22 2461

## 2022-10-02 NOTE — ED NOTES
"Patient identifiers verified and correct for Javier Downs  C/C: Pt states "I have been having really bad belly pain since they took me off my chemo"  APPEARANCE: awake and alert in no acute distress.  SKIN: warm, dry and intact. No breakdown or bruising.  MUSCULOSKELETAL: Patient moving all extremities spontaneously, no obvious swelling or deformities noted. Ambulates independently.  RESPIRATORY: Denies shortness of breath. Respirations unlabored.   CARDIAC: Denies CP, 2+ distal pulses; no peripheral edema  ABDOMEN: hard, non-tender, and non-distended, Denies N/V. Confirms generalized abdominal pain  : voids spontaneously, denies difficulty  Neurologic: AAO x 4; follows commands equal strength in all extremities; denies numbness/tingling. Denies dizziness   "

## 2022-10-03 NOTE — TELEPHONE ENCOUNTER
Called pt's home number and left message that his prep antibiotics were called into his pharmacy. Tried to call the daughter but unable to leave message on voice mail.

## 2022-10-04 RX ORDER — CIPROFLOXACIN 500 MG/1
500 TABLET ORAL 2 TIMES DAILY
Qty: 2 TABLET | Refills: 0 | Status: ON HOLD | OUTPATIENT
Start: 2022-10-04 | End: 2022-11-02 | Stop reason: HOSPADM

## 2022-10-04 RX ORDER — POLYETHYLENE GLYCOL 3350, SODIUM SULFATE ANHYDROUS, SODIUM BICARBONATE, SODIUM CHLORIDE, POTASSIUM CHLORIDE 236; 22.74; 6.74; 5.86; 2.97 G/4L; G/4L; G/4L; G/4L; G/4L
4 POWDER, FOR SOLUTION ORAL ONCE
Qty: 4000 ML | Refills: 0 | Status: SHIPPED | OUTPATIENT
Start: 2022-10-04 | End: 2022-10-04

## 2022-10-04 RX ORDER — METRONIDAZOLE 500 MG/1
500 TABLET ORAL 2 TIMES DAILY
Qty: 2 TABLET | Refills: 0 | Status: ON HOLD | OUTPATIENT
Start: 2022-10-04 | End: 2022-11-02 | Stop reason: HOSPADM

## 2022-10-11 ENCOUNTER — TELEPHONE (OUTPATIENT)
Dept: SURGERY | Facility: CLINIC | Age: 72
End: 2022-10-11
Payer: MEDICARE

## 2022-10-12 ENCOUNTER — OFFICE VISIT (OUTPATIENT)
Dept: SURGERY | Facility: CLINIC | Age: 72
End: 2022-10-12
Payer: MEDICARE

## 2022-10-12 ENCOUNTER — OFFICE VISIT (OUTPATIENT)
Dept: HEMATOLOGY/ONCOLOGY | Facility: CLINIC | Age: 72
End: 2022-10-12
Payer: MEDICARE

## 2022-10-12 VITALS — HEIGHT: 65 IN | WEIGHT: 111 LBS | BODY MASS INDEX: 18.49 KG/M2

## 2022-10-12 DIAGNOSIS — N17.9 AKI (ACUTE KIDNEY INJURY): ICD-10-CM

## 2022-10-12 DIAGNOSIS — T45.1X5A ANEMIA ASSOCIATED WITH CHEMOTHERAPY: ICD-10-CM

## 2022-10-12 DIAGNOSIS — T45.1X5A IMMUNODEFICIENCY DUE TO CHEMOTHERAPY: ICD-10-CM

## 2022-10-12 DIAGNOSIS — D84.821 IMMUNODEFICIENCY DUE TO CHEMOTHERAPY: ICD-10-CM

## 2022-10-12 DIAGNOSIS — R63.4 WEIGHT DECREASE: ICD-10-CM

## 2022-10-12 DIAGNOSIS — C78.7 METASTATIC COLON CANCER TO LIVER: Primary | ICD-10-CM

## 2022-10-12 DIAGNOSIS — D64.81 ANEMIA ASSOCIATED WITH CHEMOTHERAPY: ICD-10-CM

## 2022-10-12 DIAGNOSIS — Z79.899 IMMUNODEFICIENCY DUE TO CHEMOTHERAPY: ICD-10-CM

## 2022-10-12 DIAGNOSIS — R52 PAIN: ICD-10-CM

## 2022-10-12 DIAGNOSIS — C18.4 MALIGNANT NEOPLASM OF TRANSVERSE COLON: Primary | ICD-10-CM

## 2022-10-12 DIAGNOSIS — I10 PRIMARY HYPERTENSION: ICD-10-CM

## 2022-10-12 DIAGNOSIS — R80.9 PROTEINURIA, UNSPECIFIED TYPE: ICD-10-CM

## 2022-10-12 DIAGNOSIS — C18.9 METASTATIC COLON CANCER TO LIVER: Primary | ICD-10-CM

## 2022-10-12 PROCEDURE — 99999 PR PBB SHADOW E&M-EST. PATIENT-LVL II: CPT | Mod: PBBFAC,,, | Performed by: PHYSICIAN ASSISTANT

## 2022-10-12 PROCEDURE — 99214 OFFICE O/P EST MOD 30 MIN: CPT | Mod: S$PBB,,, | Performed by: COLON & RECTAL SURGERY

## 2022-10-12 PROCEDURE — 99999 PR PBB SHADOW E&M-EST. PATIENT-LVL II: ICD-10-PCS | Mod: PBBFAC,,, | Performed by: PHYSICIAN ASSISTANT

## 2022-10-12 PROCEDURE — 99999 PR PBB SHADOW E&M-EST. PATIENT-LVL III: ICD-10-PCS | Mod: PBBFAC,,, | Performed by: COLON & RECTAL SURGERY

## 2022-10-12 PROCEDURE — 99214 PR OFFICE/OUTPT VISIT, EST, LEVL IV, 30-39 MIN: ICD-10-PCS | Mod: S$PBB,,, | Performed by: COLON & RECTAL SURGERY

## 2022-10-12 PROCEDURE — 99999 PR PBB SHADOW E&M-EST. PATIENT-LVL III: CPT | Mod: PBBFAC,,, | Performed by: COLON & RECTAL SURGERY

## 2022-10-12 PROCEDURE — 99213 OFFICE O/P EST LOW 20 MIN: CPT | Mod: PBBFAC,27 | Performed by: COLON & RECTAL SURGERY

## 2022-10-12 PROCEDURE — 99215 OFFICE O/P EST HI 40 MIN: CPT | Mod: S$PBB,,, | Performed by: PHYSICIAN ASSISTANT

## 2022-10-12 PROCEDURE — 99215 PR OFFICE/OUTPT VISIT, EST, LEVL V, 40-54 MIN: ICD-10-PCS | Mod: S$PBB,,, | Performed by: PHYSICIAN ASSISTANT

## 2022-10-12 PROCEDURE — 99212 OFFICE O/P EST SF 10 MIN: CPT | Mod: PBBFAC | Performed by: PHYSICIAN ASSISTANT

## 2022-10-12 NOTE — PROGRESS NOTES
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 Downs is a 72 y.o. male   70 years old pleasant AA gentleman, with history of hypertension, presenting with two weeks duration of abdominal cramps, diarrhea, nausea and vomiting. His symptoms started gradually with intermittent generalize crampy abdominal pain, worse with food. That was associated with nausea, reflux and occaisonal vomiting. He tried pepto-bismol and imodium with no relief, and hence he presented to ER.   He lost significant amount of weight, but cannot quantify the amount or the time period. He has also been feeling weaker than usual.   He hasn't seen a healthcare provider in over than 10 years. He has never had a colonoscopy.   In the ER. His labs were significant of microcytic anemia of 9.7 g/dl, MCV 77, and Platelets counts of 495. CT of the abdomen and pelvis showed " Large mass in the transverse colon highly suspicious for adenocarcinoma resulting in at least high-grade partial obstruction with dilation of proximal colon and small bowel. Soft tissue nodules in the adjacent mesentery are suspicious for pily metastases and/or mesenteric implants.  Numerous hepatic masses measuring up to 11.2 cm most likely related to metastatic disease.  There also several " "small subcapsular lesions which could reflect additional metastatic disease"   CT chest was negative to metastatic disease.   He underwent colonoscopy and stent placement. 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 HER 2 negative, VIRI/SHERI NGS was cancelled due to insufficient quantity   Guardant 360 was sent, negative for KRAS, NRAS, BRAF     He started palliative chemotherapy with FOLFOX+Pema     Restaging scans on 07/22/2021 showed significant decrease in size trasverse colon mass as well liver metastatic lesion.   He was switched to maintenance 5FU+Pema from C8    Restaging scans from 10/05/2021 (after C11) showing stable-mildly improved liver mets.   Restaging scans from 12/28/2021 (after C17) showing stable disease.   Restaging scans from 03/22/22 show stable disease.     MRI on 7/18/22    Impression:     Slight increase in size of the large hepatic metastasis when accounting for differences in imaging technique.  No new disease.     Remaining 2 liver lesions remain stable in size.  The larger lesion in the left hepatic lobe has features typical of a hemangioma.  Lesion at the hepatic dome is difficult to fully characterize due to location and breathing motion artifact, though may represent a hemangioma as well.  This can be followed.     Additional stable chronic findings as above.    Interval History:   He presents today with his daughter prior to cycle 36 of 5FU and Avastin. Oxaliplatin was added back to his chemotherapy regimen during the previous cycle due to concern for progression of disease within the liver that was noted on his most recent imaging study. He tolerated the treatment fairly well. However, he presents today with increasing mid-abdominal pain. His pain medication does help. In addition, he is concerned with his weight loss due to being on a " liquid diet in preparation for his surgical resection with Dr. Bonilla. This is scheduled for 10/20. He has been trying to increase his intake of Boost and protein drinks but he gets full quickly. No nausea or vomiting. Otherwise, he is doing fairly well.  No change in bowel movements or any other new concerns.  Had a diagnostic lap with Dr. Rodriguez on 9/7/2022. He is due to see Dr. Bonilla today as well to discuss his upcoming surgery.     Presents with his daughter today. ECOG status is 1.       Past Medical History:   Past Medical History:   Diagnosis Date    MARIA A (acute kidney injury) 8/18/2022    Hypertension     Metastatic colon cancer to liver 4/22/2021       Past Surgical HIstory:   Past Surgical History:   Procedure Laterality Date    COLONOSCOPY N/A 4/20/2021    Procedure: COLONOSCOPY with possible stent;  Surgeon: EDILMA Bonilla MD;  Location: [facility] (2ND FLR);  Service: Colon and Rectal;  Laterality: N/A;    DIAGNOSTIC LAPAROSCOPY N/A 9/7/2022    Procedure: LAPAROSCOPY, DIAGNOSTIC;  Surgeon: Adrian Rodriguez MD;  [facility];  Service: General;  Laterality: N/A;    INSERTION OF TUNNELED CENTRAL VENOUS CATHETER (CVC) WITH SUBCUTANEOUS PORT N/A 5/3/2021    Procedure: [id number]-LMAA-B-CATH;  Surgeon: Francisco Layton MD;  [facility];  Service: Vascular;  Laterality: N/A;       Family History: History reviewed. No pertinent family history.    Social History:  reports that he has never smoked. He has never used smokeless tobacco. He reports current alcohol use.    Allergies:  Review of patient's allergies indicates:  No Known Allergies    Medications:  Current Outpatient Medications   Medication Sig Dispense Refill    acetaminophen (TYLENOL) 500 MG tablet Take 1 tablet (500 mg total) by mouth every 6 (six) hours as needed for Pain (alternate with ibuprofen).  0    amLODIPine (NORVASC) 10 MG tablet Take 1 tablet (10 mg total) by mouth once daily. 90 tablet 3    ciprofloxacin HCl  (CIPRO) 500 MG tablet Take 1 tablet (500 mg total) by mouth 2 (two) times daily. 2 tablet 0    ibuprofen (ADVIL,MOTRIN) 400 MG tablet Take 1 tablet (400 mg total) by mouth every 6 (six) hours as needed for Other (pain). Alternate with tylenol      LIDOcaine-prilocaine (EMLA) cream Apply topically as needed (Apply one hour before treatment). 30 g 5    metroNIDAZOLE (FLAGYL) 500 MG tablet Take 1 tablet (500 mg total) by mouth 2 (two) times daily. 2 tablet 0    ondansetron (ZOFRAN) 4 MG tablet Take 1 tablet (4 mg total) by mouth every 8 (eight) hours as needed for Nausea. 30 tablet 3     Current Facility-Administered Medications   Medication Dose Route Frequency Provider Last Rate Last Admin    sars-cov-2 (covid-19) 30 mcg/0.3 ml injection 0.3 mL  0.3 mL Intramuscular 1 time in Clinic/HOD Idania Rock LPN           Review of Systems   Constitutional:  Positive for appetite change, fatigue and unexpected weight change. Negative for fever.   HENT:  Negative for congestion and nosebleeds.    Eyes:  Negative for pain and visual disturbance.   Respiratory:  Negative for cough, chest tightness, shortness of breath and wheezing.    Cardiovascular:  Negative for chest pain, palpitations and leg swelling.   Gastrointestinal:  Positive for abdominal pain. Negative for blood in stool, constipation, diarrhea, nausea and vomiting.   Genitourinary:  Negative for difficulty urinating, flank pain, frequency, hematuria and urgency.   Musculoskeletal:  Negative for arthralgias, back pain and myalgias.   Neurological:  Negative for dizziness, weakness, numbness and headaches.   Psychiatric/Behavioral:  The patient is not nervous/anxious.      ECOG Performance Status: 1   Objective:      Vitals:   There were no vitals filed for this visit.    Physical Exam  Constitutional:       General: He is not in acute distress.     Appearance: Normal appearance.   HENT:      Head: Normocephalic and atraumatic.   Eyes:      Pupils: Pupils are equal,  round, and reactive to light.   Cardiovascular:      Rate and Rhythm: Normal rate and regular rhythm.      Pulses: Normal pulses.      Heart sounds: Normal heart sounds. No murmur heard.  Pulmonary:      Effort: Pulmonary effort is normal. No respiratory distress.      Breath sounds: Normal breath sounds. No wheezing.   Abdominal:      General: Abdomen is flat. Bowel sounds are normal. There is no distension.      Palpations: Abdomen is soft. There is no mass.      Tenderness: There is no abdominal tenderness.   Musculoskeletal:         General: No swelling or deformity. Normal range of motion.   Skin:     Coloration: Skin is not jaundiced.   Neurological:      General: No focal deficit present.      Mental Status: He is alert and oriented to person, place, and time. Mental status is at baseline.   Psychiatric:         Mood and Affect: Mood normal.       Laboratory Data:  No visits with results within 1 Week(s) from this visit.   Latest known visit with results is:   Admission on 10/02/2022, Discharged on 10/02/2022   Component Date Value Ref Range Status    WBC 10/02/2022 7.03  3.90 - 12.70 K/uL Final    RBC 10/02/2022 4.56 (L)  4.60 - 6.20 M/uL Final    Hemoglobin 10/02/2022 13.1 (L)  14.0 - 18.0 g/dL Final    Hematocrit 10/02/2022 40.9  40.0 - 54.0 % Final    MCV 10/02/2022 90  82 - 98 fL Final    MCH 10/02/2022 28.7  27.0 - 31.0 pg Final    MCHC 10/02/2022 32.0  32.0 - 36.0 g/dL Final    RDW 10/02/2022 14.7 (H)  11.5 - 14.5 % Final    Platelets 10/02/2022 477 (H)  150 - 450 K/uL Final    MPV 10/02/2022 9.6  9.2 - 12.9 fL Final    Immature Granulocytes 10/02/2022 0.3  0.0 - 0.5 % Final    Gran # (ANC) 10/02/2022 5.2  1.8 - 7.7 K/uL Final    Immature Grans (Abs) 10/02/2022 0.02  0.00 - 0.04 K/uL Final    Comment: Mild elevation in immature granulocytes is non specific and   can be seen in a variety of conditions including stress response,   acute inflammation, trauma and pregnancy. Correlation with other    laboratory and clinical findings is essential.      Lymph # 10/02/2022 1.0  1.0 - 4.8 K/uL Final    Mono # 10/02/2022 0.8  0.3 - 1.0 K/uL Final    Eos # 10/02/2022 0.0  0.0 - 0.5 K/uL Final    Baso # 10/02/2022 0.02  0.00 - 0.20 K/uL Final    nRBC 10/02/2022 0  0 /100 WBC Final    Gran % 10/02/2022 74.2 (H)  38.0 - 73.0 % Final    Lymph % 10/02/2022 13.9 (L)  18.0 - 48.0 % Final    Mono % 10/02/2022 11.2  4.0 - 15.0 % Final    Eosinophil % 10/02/2022 0.1  0.0 - 8.0 % Final    Basophil % 10/02/2022 0.3  0.0 - 1.9 % Final    Differential Method 10/02/2022 Automated   Final    Sodium 10/02/2022 135 (L)  136 - 145 mmol/L Final    Potassium 10/02/2022 3.9  3.5 - 5.1 mmol/L Final    Chloride 10/02/2022 101  95 - 110 mmol/L Final    CO2 10/02/2022 23  23 - 29 mmol/L Final    Glucose 10/02/2022 103  70 - 110 mg/dL Final    BUN 10/02/2022 12  8 - 23 mg/dL Final    Creatinine 10/02/2022 0.7  0.5 - 1.4 mg/dL Final    Calcium 10/02/2022 8.2 (L)  8.7 - 10.5 mg/dL Final    Total Protein 10/02/2022 5.8 (L)  6.0 - 8.4 g/dL Final    Albumin 10/02/2022 2.1 (L)  3.5 - 5.2 g/dL Final    Total Bilirubin 10/02/2022 0.7  0.1 - 1.0 mg/dL Final    Comment: For infants and newborns, interpretation of results should be based  on gestational age, weight and in agreement with clinical  observations.    Premature Infant recommended reference ranges:  Up to 24 hours.............<8.0 mg/dL  Up to 48 hours............<12.0 mg/dL  3-5 days..................<15.0 mg/dL  6-29 days.................<15.0 mg/dL      Alkaline Phosphatase 10/02/2022 78  55 - 135 U/L Final    AST 10/02/2022 18  10 - 40 U/L Final    ALT 10/02/2022 8 (L)  10 - 44 U/L Final    Anion Gap 10/02/2022 11  8 - 16 mmol/L Final    eGFR 10/02/2022 >60.0  >60 mL/min/1.73 m^2 Final   CEA stable at 11.0 previously, 11.4 from 12.7.      Assessment and Plan        1. 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 Primary hypertension    4. MARIA A (acute  kidney injury)    5. Proteinuria, unspecified type    6. Anemia associated with chemotherapy    7. Pain    8. Weight decrease            1,2.  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  Guardant 360 was negative for BRAF, VIRI, HER2 amplification.   Pretreatment CEA 57.  We had a long and sonia discussion with him about his diagnosis. Unfortunately, the disease is not curable but remains treatable and he has good performance status.   Restaging scans after 7 cycles showed significant reduction in colonic mass and liver mass.   we switched to maintenance as of cycle 8 with 5FU + Pema  Restaging scans from March 2022 show stable disease.   MRI on 7/18/22 showing hepatic progression of disease in the right hepatic lobe noted on the exam. CT CAP on 8/26 shows some mild progression in both liver metastases.    Discussed case at colorectal tumor board 8/31/22 and had a diagnostic lap performed by Dr. Rodriguez on 9/7 to assess for any occult peritoneal disease. Findings from the diagnostic lap were favorable, given that it was negative for omental thickening or with implants at the base of the small bowel mesentery. Fluid from around from around the primary mass was sent for cytology that was negative for malignancy. Hence, it was determined that he would be a good candidate for liver metastasectomy and resection of primary tumor with Dr. Bonilla that is currently scheduled for 10/20.   Y-90 was deferred due to the potential for surgery. We will re-evaluate if Y-90 is needed after his surgery is complete. Pt is agreeable.    Doing fairly today. He does report some mid-abdominal discomfort and weight loss due to the liquid diet that he has been on. His pain is manageable and we are hopeful will improve after he recovers from surgery. Will continue his pain medication for now. Otherwise, no other concerns today  Lab work has been reviewed and stable.   Scheduled for surgery with Dr. Bonilla on  10/20. Will hold chemotherapy.   RTC in 3 weeks after surgery for f/u and physical exam. Pt and daughter are agreeable.     Hold chemotherapy at this time.     3. Hypertension   --Well Controlled.   --continue with Amlodipine .     4. MARIA A, proteinuria  --Improved renal function. Cr 0.7.  - Holding chemotherapy for now given he is scheduled for surgery on 10/20  -- Monitor U/A.    5. Anemia  -- Mild. Monitor.    6,7. Neoplasm related pain, weight loss  -- Will continue with pain management at this time  -- Hopeful that his pain and appetite will begin to improve as he recovers from surgery. Will continue to monitor.   -- Will have him see our nutrition team as well, once he has recovered.     Pte and family members displayed understanding of the above encounter and treatment plan. All thoughtful questions were answered to their satisfaction. Pte was advised to notify the care team or proceed to the ER if signs and symptoms worsen.Pt was discussed with Dr. Schuster as well.       FAN Meier, PA-C  Physician Assistant Certified  Dept of Hematology/Oncology  PA-C to Dr. Mueller, Dr. Schuster and Dr. Castellon     Route Chart for Scheduling    Med Onc Chart Routing      Follow up with physician 3 weeks. Please schedule f/u with cbc, cmp and clinic visit with Dr. Schuster as a surgery f/u in 3 weeks.   Follow up with RACHEL    Infusion scheduling note    Injection scheduling note    Labs CBC and CMP   Lab interval:  Perform in 3 weeks.   Imaging    Pharmacy appointment    Other referrals        Treatment Plan Information   OP COLORECTAL FOLFOX + BEVACIZUMAB Q2W   Torres Pantoja MD   Upcoming Treatment Dates - OP COLORECTAL FOLFOX + BEVACIZUMAB Q2W    9/15/2022       Chemotherapy       oxaliplatin (ELOXATIN) 85 mg/m2 = 138 mg in dextrose 5 % chemo infusion       fluorouraciL 2,400 mg/m2 = 3,890 mg in sodium chloride 0.9% 100 mL chemo infusion       Antiemetics       palonosetron (ALOXI) 0.25 mg with Dexamethasone  (DECADRON) 12 mg in NS 50 mL IVPB  9/29/2022       Chemotherapy       oxaliplatin (ELOXATIN) in dextrose 5 % 500 mL chemo infusion       fluorouraciL in sodium chloride 0.9% 100 mL chemo infusion       Antiemetics       palonosetron (ALOXI) 0.25 mg with Dexamethasone (DECADRON) 12 mg in NS 50 mL IVPB

## 2022-10-18 ENCOUNTER — TELEPHONE (OUTPATIENT)
Dept: SURGERY | Facility: CLINIC | Age: 72
End: 2022-10-18
Payer: MEDICARE

## 2022-10-18 RX ORDER — METRONIDAZOLE 500 MG/1
500 TABLET ORAL 2 TIMES DAILY
Qty: 2 TABLET | Refills: 0 | Status: ON HOLD | OUTPATIENT
Start: 2022-10-18 | End: 2022-11-02 | Stop reason: HOSPADM

## 2022-10-18 RX ORDER — POLYETHYLENE GLYCOL 3350, SODIUM SULFATE ANHYDROUS, SODIUM BICARBONATE, SODIUM CHLORIDE, POTASSIUM CHLORIDE 236; 22.74; 6.74; 5.86; 2.97 G/4L; G/4L; G/4L; G/4L; G/4L
4 POWDER, FOR SOLUTION ORAL ONCE
Qty: 4000 ML | Refills: 0 | Status: ON HOLD | OUTPATIENT
Start: 2022-10-18 | End: 2022-11-02 | Stop reason: HOSPADM

## 2022-10-18 RX ORDER — CIPROFLOXACIN 500 MG/1
500 TABLET ORAL 2 TIMES DAILY
Qty: 2 TABLET | Refills: 0 | Status: ON HOLD | OUTPATIENT
Start: 2022-10-18 | End: 2022-11-02 | Stop reason: HOSPADM

## 2022-10-19 ENCOUNTER — ANESTHESIA EVENT (OUTPATIENT)
Dept: SURGERY | Facility: HOSPITAL | Age: 72
DRG: 330 | End: 2022-10-19
Payer: MEDICARE

## 2022-10-19 NOTE — PRE-PROCEDURE INSTRUCTIONS
Preoperative NPO and Bowel Prep instructions given per CRS Dept. Patient's questions answered and patient V/C/U.    PREOP INSTRUCTIONS:    Shower instructions as well as directions to the Surgery Center were given.Encouraged to wear loose fitting,comfortable clothing.Medication instructions for pm prior to and am of procedure reviewed.Instructed to avoid taking vitamins,supplements,aspirin and ibuprofen the morning of surgery.     Patient advised of the updated visitor policy    Patient denies any side effects or issues with anesthesia or sedation.    0830 ARRIVAL TO [facility] GIVEN

## 2022-10-20 ENCOUNTER — HOSPITAL ENCOUNTER (INPATIENT)
Facility: HOSPITAL | Age: 72
LOS: 13 days | Discharge: HOME | DRG: 330 | End: 2022-11-02
Attending: COLON & RECTAL SURGERY | Admitting: COLON & RECTAL SURGERY
Payer: MEDICARE

## 2022-10-20 ENCOUNTER — ANESTHESIA (OUTPATIENT)
Dept: SURGERY | Facility: HOSPITAL | Age: 72
DRG: 330 | End: 2022-10-20
Payer: MEDICARE

## 2022-10-20 DIAGNOSIS — C18.9 COLON CANCER METASTASIZED TO LIVER: Primary | ICD-10-CM

## 2022-10-20 DIAGNOSIS — C18.9 METASTATIC COLON CANCER TO LIVER: ICD-10-CM

## 2022-10-20 DIAGNOSIS — C18.9 COLON CANCER: ICD-10-CM

## 2022-10-20 DIAGNOSIS — C78.7 METASTATIC COLON CANCER TO LIVER: ICD-10-CM

## 2022-10-20 DIAGNOSIS — C78.7 COLON CANCER METASTASIZED TO LIVER: Primary | ICD-10-CM

## 2022-10-20 LAB
ABO + RH BLD: NORMAL
BLD GP AB SCN CELLS X3 SERPL QL: NORMAL

## 2022-10-20 PROCEDURE — 37000009 HC ANESTHESIA EA ADD 15 MINS: Performed by: COLON & RECTAL SURGERY

## 2022-10-20 PROCEDURE — 63600175 PHARM REV CODE 636 W HCPCS: Performed by: STUDENT IN AN ORGANIZED HEALTH CARE EDUCATION/TRAINING PROGRAM

## 2022-10-20 PROCEDURE — 44160 PR REMVL COLON & TERM ILEUM W/ILEOCOLOSTOMY: ICD-10-PCS | Mod: ,,, | Performed by: COLON & RECTAL SURGERY

## 2022-10-20 PROCEDURE — 71000015 HC POSTOP RECOV 1ST HR: Performed by: COLON & RECTAL SURGERY

## 2022-10-20 PROCEDURE — 25000003 PHARM REV CODE 250: Performed by: COLON & RECTAL SURGERY

## 2022-10-20 PROCEDURE — 25000003 PHARM REV CODE 250: Performed by: NURSE ANESTHETIST, CERTIFIED REGISTERED

## 2022-10-20 PROCEDURE — 94761 N-INVAS EAR/PLS OXIMETRY MLT: CPT

## 2022-10-20 PROCEDURE — D9220A PRA ANESTHESIA: ICD-10-PCS | Mod: CRNA,,, | Performed by: NURSE ANESTHETIST, CERTIFIED REGISTERED

## 2022-10-20 PROCEDURE — 25000003 PHARM REV CODE 250: Performed by: NURSE PRACTITIONER

## 2022-10-20 PROCEDURE — 86850 RBC ANTIBODY SCREEN: CPT | Performed by: NURSE PRACTITIONER

## 2022-10-20 PROCEDURE — S0030 INJECTION, METRONIDAZOLE: HCPCS | Performed by: NURSE PRACTITIONER

## 2022-10-20 PROCEDURE — 88307 TISSUE EXAM BY PATHOLOGIST: CPT | Mod: 26,,, | Performed by: PATHOLOGY

## 2022-10-20 PROCEDURE — 20600001 HC STEP DOWN PRIVATE ROOM

## 2022-10-20 PROCEDURE — 63600175 PHARM REV CODE 636 W HCPCS: Performed by: NURSE ANESTHETIST, CERTIFIED REGISTERED

## 2022-10-20 PROCEDURE — D9220A PRA ANESTHESIA: ICD-10-PCS | Mod: ANES,,, | Performed by: ANESTHESIOLOGY

## 2022-10-20 PROCEDURE — 71000033 HC RECOVERY, INTIAL HOUR: Performed by: COLON & RECTAL SURGERY

## 2022-10-20 PROCEDURE — 27201423 OPTIME MED/SURG SUP & DEVICES STERILE SUPPLY: Performed by: COLON & RECTAL SURGERY

## 2022-10-20 PROCEDURE — 88307 PR  SURG PATH,LEVEL V: ICD-10-PCS | Mod: 26,,, | Performed by: PATHOLOGY

## 2022-10-20 PROCEDURE — 63600175 PHARM REV CODE 636 W HCPCS: Performed by: NURSE PRACTITIONER

## 2022-10-20 PROCEDURE — 44160 REMOVAL OF COLON: CPT | Mod: ,,, | Performed by: COLON & RECTAL SURGERY

## 2022-10-20 PROCEDURE — 37000008 HC ANESTHESIA 1ST 15 MINUTES: Performed by: COLON & RECTAL SURGERY

## 2022-10-20 PROCEDURE — 71000016 HC POSTOP RECOV ADDL HR: Performed by: COLON & RECTAL SURGERY

## 2022-10-20 PROCEDURE — D9220A PRA ANESTHESIA: Mod: CRNA,,, | Performed by: NURSE ANESTHETIST, CERTIFIED REGISTERED

## 2022-10-20 PROCEDURE — 36000708 HC OR TIME LEV III 1ST 15 MIN: Performed by: COLON & RECTAL SURGERY

## 2022-10-20 PROCEDURE — 25000003 PHARM REV CODE 250: Performed by: STUDENT IN AN ORGANIZED HEALTH CARE EDUCATION/TRAINING PROGRAM

## 2022-10-20 PROCEDURE — 36000709 HC OR TIME LEV III EA ADD 15 MIN: Performed by: COLON & RECTAL SURGERY

## 2022-10-20 PROCEDURE — 71000039 HC RECOVERY, EACH ADD'L HOUR: Performed by: COLON & RECTAL SURGERY

## 2022-10-20 PROCEDURE — 88305 TISSUE EXAM BY PATHOLOGIST: CPT | Mod: 26,,, | Performed by: PATHOLOGY

## 2022-10-20 PROCEDURE — 88305 TISSUE EXAM BY PATHOLOGIST: CPT | Performed by: PATHOLOGY

## 2022-10-20 PROCEDURE — 36415 COLL VENOUS BLD VENIPUNCTURE: CPT | Performed by: NURSE PRACTITIONER

## 2022-10-20 PROCEDURE — D9220A PRA ANESTHESIA: Mod: ANES,,, | Performed by: ANESTHESIOLOGY

## 2022-10-20 PROCEDURE — 88305 TISSUE EXAM BY PATHOLOGIST: ICD-10-PCS | Mod: 26,,, | Performed by: PATHOLOGY

## 2022-10-20 PROCEDURE — 88307 TISSUE EXAM BY PATHOLOGIST: CPT | Performed by: PATHOLOGY

## 2022-10-20 RX ORDER — HYDROMORPHONE HYDROCHLORIDE 1 MG/ML
0.2 INJECTION, SOLUTION INTRAMUSCULAR; INTRAVENOUS; SUBCUTANEOUS EVERY 5 MIN PRN
Status: DISCONTINUED | OUTPATIENT
Start: 2022-10-20 | End: 2022-10-20 | Stop reason: HOSPADM

## 2022-10-20 RX ORDER — BUPIVACAINE HYDROCHLORIDE 2.5 MG/ML
INJECTION, SOLUTION EPIDURAL; INFILTRATION; INTRACAUDAL
Status: DISCONTINUED | OUTPATIENT
Start: 2022-10-20 | End: 2022-10-20 | Stop reason: HOSPADM

## 2022-10-20 RX ORDER — GABAPENTIN 300 MG/1
300 CAPSULE ORAL
Status: COMPLETED | OUTPATIENT
Start: 2022-10-20 | End: 2022-10-20

## 2022-10-20 RX ORDER — SODIUM CHLORIDE 0.9 % (FLUSH) 0.9 %
3 SYRINGE (ML) INJECTION
Status: DISCONTINUED | OUTPATIENT
Start: 2022-10-20 | End: 2022-10-20 | Stop reason: HOSPADM

## 2022-10-20 RX ORDER — DEXTROSE MONOHYDRATE, SODIUM CHLORIDE, AND POTASSIUM CHLORIDE 50; 1.49; 9 G/1000ML; G/1000ML; G/1000ML
INJECTION, SOLUTION INTRAVENOUS CONTINUOUS
Status: DISCONTINUED | OUTPATIENT
Start: 2022-10-20 | End: 2022-10-22

## 2022-10-20 RX ORDER — FENTANYL CITRATE 50 UG/ML
INJECTION, SOLUTION INTRAMUSCULAR; INTRAVENOUS
Status: DISCONTINUED | OUTPATIENT
Start: 2022-10-20 | End: 2022-10-20

## 2022-10-20 RX ORDER — MUPIROCIN 20 MG/G
OINTMENT TOPICAL 2 TIMES DAILY
Status: DISPENSED | OUTPATIENT
Start: 2022-10-20 | End: 2022-10-25

## 2022-10-20 RX ORDER — METRONIDAZOLE 500 MG/100ML
500 INJECTION, SOLUTION INTRAVENOUS
Status: COMPLETED | OUTPATIENT
Start: 2022-10-20 | End: 2022-10-20

## 2022-10-20 RX ORDER — ALBUMIN HUMAN 50 G/1000ML
SOLUTION INTRAVENOUS CONTINUOUS PRN
Status: DISCONTINUED | OUTPATIENT
Start: 2022-10-20 | End: 2022-10-20

## 2022-10-20 RX ORDER — LABETALOL HYDROCHLORIDE 5 MG/ML
INJECTION, SOLUTION INTRAVENOUS
Status: DISCONTINUED | OUTPATIENT
Start: 2022-10-20 | End: 2022-10-20

## 2022-10-20 RX ORDER — HEPARIN SODIUM 5000 [USP'U]/ML
5000 INJECTION, SOLUTION INTRAVENOUS; SUBCUTANEOUS EVERY 8 HOURS
Status: COMPLETED | OUTPATIENT
Start: 2022-10-20 | End: 2022-10-20

## 2022-10-20 RX ORDER — LIDOCAINE HYDROCHLORIDE 10 MG/ML
1 INJECTION, SOLUTION EPIDURAL; INFILTRATION; INTRACAUDAL; PERINEURAL
Status: DISCONTINUED | OUTPATIENT
Start: 2022-10-20 | End: 2022-10-22

## 2022-10-20 RX ORDER — SODIUM CHLORIDE 9 MG/ML
INJECTION, SOLUTION INTRAVENOUS CONTINUOUS
Status: DISCONTINUED | OUTPATIENT
Start: 2022-10-20 | End: 2022-10-22

## 2022-10-20 RX ORDER — KETAMINE HCL IN 0.9 % NACL 50 MG/5 ML
SYRINGE (ML) INTRAVENOUS
Status: DISCONTINUED | OUTPATIENT
Start: 2022-10-20 | End: 2022-10-20

## 2022-10-20 RX ORDER — NALOXONE HCL 0.4 MG/ML
0.02 VIAL (ML) INJECTION
Status: DISCONTINUED | OUTPATIENT
Start: 2022-10-20 | End: 2022-11-02 | Stop reason: HOSPADM

## 2022-10-20 RX ORDER — ALVIMOPAN 12 MG/1
12 CAPSULE ORAL
Status: COMPLETED | OUTPATIENT
Start: 2022-10-20 | End: 2022-10-20

## 2022-10-20 RX ORDER — SODIUM CHLORIDE 9 MG/ML
INJECTION, SOLUTION INTRAVENOUS
Status: COMPLETED | OUTPATIENT
Start: 2022-10-20 | End: 2022-10-20

## 2022-10-20 RX ORDER — MUPIROCIN 20 MG/G
1 OINTMENT TOPICAL
Status: COMPLETED | OUTPATIENT
Start: 2022-10-20 | End: 2022-10-20

## 2022-10-20 RX ORDER — ACETAMINOPHEN 10 MG/ML
INJECTION, SOLUTION INTRAVENOUS
Status: DISCONTINUED | OUTPATIENT
Start: 2022-10-20 | End: 2022-10-20

## 2022-10-20 RX ORDER — PROPOFOL 10 MG/ML
VIAL (ML) INTRAVENOUS
Status: DISCONTINUED | OUTPATIENT
Start: 2022-10-20 | End: 2022-10-20

## 2022-10-20 RX ORDER — AMLODIPINE BESYLATE 10 MG/1
10 TABLET ORAL DAILY
Status: DISCONTINUED | OUTPATIENT
Start: 2022-10-21 | End: 2022-11-02 | Stop reason: HOSPADM

## 2022-10-20 RX ORDER — TRIPROLIDINE/PSEUDOEPHEDRINE 2.5MG-60MG
600 TABLET ORAL
Status: COMPLETED | OUTPATIENT
Start: 2022-10-20 | End: 2022-10-20

## 2022-10-20 RX ORDER — SODIUM CHLORIDE 9 MG/ML
INJECTION, SOLUTION INTRAVENOUS CONTINUOUS PRN
Status: DISCONTINUED | OUTPATIENT
Start: 2022-10-20 | End: 2022-10-20

## 2022-10-20 RX ORDER — ALVIMOPAN 12 MG/1
12 CAPSULE ORAL 2 TIMES DAILY
Status: COMPLETED | OUTPATIENT
Start: 2022-10-20 | End: 2022-10-27

## 2022-10-20 RX ORDER — PHENYLEPHRINE HYDROCHLORIDE 10 MG/ML
INJECTION INTRAVENOUS
Status: DISCONTINUED | OUTPATIENT
Start: 2022-10-20 | End: 2022-10-20

## 2022-10-20 RX ORDER — HYDROMORPHONE HCL IN 0.9% NACL 6 MG/30 ML
PATIENT CONTROLLED ANALGESIA SYRINGE INTRAVENOUS CONTINUOUS
Status: DISCONTINUED | OUTPATIENT
Start: 2022-10-20 | End: 2022-10-22

## 2022-10-20 RX ORDER — GABAPENTIN 300 MG/1
300 CAPSULE ORAL 3 TIMES DAILY
Status: DISCONTINUED | OUTPATIENT
Start: 2022-10-20 | End: 2022-10-26

## 2022-10-20 RX ORDER — ONDANSETRON 2 MG/ML
4 INJECTION INTRAMUSCULAR; INTRAVENOUS ONCE AS NEEDED
Status: COMPLETED | OUTPATIENT
Start: 2022-10-20 | End: 2022-10-27

## 2022-10-20 RX ORDER — ROCURONIUM BROMIDE 10 MG/ML
INJECTION, SOLUTION INTRAVENOUS
Status: DISCONTINUED | OUTPATIENT
Start: 2022-10-20 | End: 2022-10-20

## 2022-10-20 RX ORDER — ACETAMINOPHEN 650 MG/20.3ML
975 LIQUID ORAL
Status: COMPLETED | OUTPATIENT
Start: 2022-10-20 | End: 2022-10-20

## 2022-10-20 RX ORDER — MIDAZOLAM HYDROCHLORIDE 1 MG/ML
INJECTION INTRAMUSCULAR; INTRAVENOUS
Status: DISCONTINUED | OUTPATIENT
Start: 2022-10-20 | End: 2022-10-20

## 2022-10-20 RX ORDER — LIDOCAINE HCL/PF 100 MG/5ML
SYRINGE (ML) INTRAVENOUS
Status: DISCONTINUED | OUTPATIENT
Start: 2022-10-20 | End: 2022-10-20

## 2022-10-20 RX ORDER — HALOPERIDOL 5 MG/ML
0.5 INJECTION INTRAMUSCULAR EVERY 10 MIN PRN
Status: DISCONTINUED | OUTPATIENT
Start: 2022-10-20 | End: 2022-10-20 | Stop reason: HOSPADM

## 2022-10-20 RX ADMIN — IBUPROFEN 600 MG: 100 SUSPENSION ORAL at 09:10

## 2022-10-20 RX ADMIN — ROCURONIUM BROMIDE 10 MG: 10 INJECTION INTRAVENOUS at 05:10

## 2022-10-20 RX ADMIN — SUGAMMADEX 200 MG: 100 INJECTION, SOLUTION INTRAVENOUS at 07:10

## 2022-10-20 RX ADMIN — Medication 20 MG: at 05:10

## 2022-10-20 RX ADMIN — ROCURONIUM BROMIDE 70 MG: 10 INJECTION INTRAVENOUS at 04:10

## 2022-10-20 RX ADMIN — ACETAMINOPHEN 976.6 MG: 650 SOLUTION ORAL at 09:10

## 2022-10-20 RX ADMIN — FENTANYL CITRATE 25 MCG: 50 INJECTION, SOLUTION INTRAMUSCULAR; INTRAVENOUS at 05:10

## 2022-10-20 RX ADMIN — SODIUM CHLORIDE: 0.9 INJECTION, SOLUTION INTRAVENOUS at 09:10

## 2022-10-20 RX ADMIN — PHENYLEPHRINE HYDROCHLORIDE 100 MCG: 10 INJECTION INTRAVENOUS at 04:10

## 2022-10-20 RX ADMIN — MUPIROCIN: 20 OINTMENT TOPICAL at 08:10

## 2022-10-20 RX ADMIN — SODIUM CHLORIDE, SODIUM GLUCONATE, SODIUM ACETATE, POTASSIUM CHLORIDE, MAGNESIUM CHLORIDE, SODIUM PHOSPHATE, DIBASIC, AND POTASSIUM PHOSPHATE: .53; .5; .37; .037; .03; .012; .00082 INJECTION, SOLUTION INTRAVENOUS at 04:10

## 2022-10-20 RX ADMIN — MUPIROCIN 1 G: 20 OINTMENT TOPICAL at 09:10

## 2022-10-20 RX ADMIN — Medication 10 MG: at 06:10

## 2022-10-20 RX ADMIN — FENTANYL CITRATE 25 MCG: 50 INJECTION, SOLUTION INTRAMUSCULAR; INTRAVENOUS at 04:10

## 2022-10-20 RX ADMIN — LIDOCAINE HYDROCHLORIDE 100 MG: 20 INJECTION, SOLUTION INTRAVENOUS at 04:10

## 2022-10-20 RX ADMIN — METRONIDAZOLE 500 MG: 500 INJECTION, SOLUTION INTRAVENOUS at 04:10

## 2022-10-20 RX ADMIN — HEPARIN SODIUM 5000 UNITS: 5000 INJECTION INTRAVENOUS; SUBCUTANEOUS at 09:10

## 2022-10-20 RX ADMIN — FENTANYL CITRATE 50 MCG: 50 INJECTION, SOLUTION INTRAMUSCULAR; INTRAVENOUS at 04:10

## 2022-10-20 RX ADMIN — ALVIMOPAN 12 MG: 12 CAPSULE ORAL at 10:10

## 2022-10-20 RX ADMIN — ACETAMINOPHEN 1000 MG: 10 INJECTION, SOLUTION INTRAVENOUS at 04:10

## 2022-10-20 RX ADMIN — ALVIMOPAN 12 MG: 12 CAPSULE ORAL at 09:10

## 2022-10-20 RX ADMIN — DEXTROSE MONOHYDRATE, SODIUM CHLORIDE, AND POTASSIUM CHLORIDE: 50; 9; 1.49 INJECTION, SOLUTION INTRAVENOUS at 08:10

## 2022-10-20 RX ADMIN — ROCURONIUM BROMIDE 20 MG: 10 INJECTION INTRAVENOUS at 05:10

## 2022-10-20 RX ADMIN — GABAPENTIN 300 MG: 300 CAPSULE ORAL at 09:10

## 2022-10-20 RX ADMIN — LABETALOL HYDROCHLORIDE 10 MG: 5 INJECTION, SOLUTION INTRAVENOUS at 07:10

## 2022-10-20 RX ADMIN — ALBUMIN HUMAN: 50 SOLUTION INTRAVENOUS at 06:10

## 2022-10-20 RX ADMIN — PROPOFOL 100 MG: 10 INJECTION, EMULSION INTRAVENOUS at 04:10

## 2022-10-20 RX ADMIN — SODIUM CHLORIDE: 0.9 INJECTION, SOLUTION INTRAVENOUS at 03:10

## 2022-10-20 RX ADMIN — MIDAZOLAM HYDROCHLORIDE 1 MG: 1 INJECTION, SOLUTION INTRAMUSCULAR; INTRAVENOUS at 04:10

## 2022-10-20 RX ADMIN — CEFTRIAXONE 2 G: 2 INJECTION, SOLUTION INTRAVENOUS at 04:10

## 2022-10-20 RX ADMIN — Medication: at 08:10

## 2022-10-20 RX ADMIN — PROPOFOL 50 MG: 10 INJECTION, EMULSION INTRAVENOUS at 04:10

## 2022-10-20 NOTE — ANESTHESIA PREPROCEDURE EVALUATION
10/20/2022  Pre-operative evaluation for Procedure(s) (LRB):  HEMICOLECTOMY, RIGHT (N/A)    Javier Downs is a 72 y.o. male     Patient Active Problem List   Diagnosis    Small bowel obstruction, partial    Colonic mass    Liver masses    Hypertension    Microcytic anemia    Thrombocytosis    Mesenteric vein thrombosis    Metastatic colon cancer to liver    Colon cancer metastasized to liver    MARIA A (acute kidney injury)       Review of patient's allergies indicates:  No Known Allergies    No current facility-administered medications on file prior to encounter.     Current Outpatient Medications on File Prior to Encounter   Medication Sig Dispense Refill    acetaminophen (TYLENOL) 500 MG tablet Take 1 tablet (500 mg total) by mouth every 6 (six) hours as needed for Pain (alternate with ibuprofen).  0    amLODIPine (NORVASC) 10 MG tablet Take 1 tablet (10 mg total) by mouth once daily. 90 tablet 3    ibuprofen (ADVIL,MOTRIN) 400 MG tablet Take 1 tablet (400 mg total) by mouth every 6 (six) hours as needed for Other (pain). Alternate with tylenol      ondansetron (ZOFRAN) 4 MG tablet Take 1 tablet (4 mg total) by mouth every 8 (eight) hours as needed for Nausea. 30 tablet 3    LIDOcaine-prilocaine (EMLA) cream Apply topically as needed (Apply one hour before treatment). 30 g 5       Past Surgical History:   Procedure Laterality Date    COLONOSCOPY N/A 4/20/2021    Procedure: COLONOSCOPY with possible stent;  Surgeon: EDILMA Bonilla MD;  Location: [facility] ([address] [place] [address]);  Service: Colon and Rectal;  Laterality: N/A;    DIAGNOSTIC LAPAROSCOPY N/A 9/7/2022    Procedure: LAPAROSCOPY, DIAGNOSTIC;  Surgeon: Adrian Rodriguez MD;  [facility];  Service: General;  Laterality: N/A;    INSERTION OF TUNNELED CENTRAL VENOUS CATHETER (CVC) WITH SUBCUTANEOUS PORT N/A 5/3/2021    Procedure:  [id number]-CATH;  Surgeon: Francisco Layton MD;  Location: [facility] OR [address] [place] [address];  Service: Vascular;  Laterality: N/A;       Social History     Socioeconomic History    Marital status:    Tobacco Use    Smoking status: Never    Smokeless tobacco: Never   Substance and Sexual Activity    Alcohol use: Yes         CBC: No results for input(s): WBC, RBC, HGB, HCT, PLT, MCV, MCH, MCHC in the last 72 hours.    CMP: No results for input(s): NA, K, CL, CO2, BUN, CREATININE, GLU, MG, PHOS, CALCIUM, ALBUMIN, PROT, ALKPHOS, ALT, AST, BILITOT in the last 72 hours.    INR  No results for input(s): PT, INR, PROTIME, APTT in the last 72 hours.        Diagnostic Studies:      EKD Echo:  No results found for this or any previous visit.        Pre-op Assessment    I have reviewed the Patient Summary Reports.       I have reviewed the Medications.     Review of Systems  Anesthesia Hx:  History of prior surgery of interest to airway management or planning:  Denies Personal Hx of Anesthesia complications.   Social:  Non-Smoker    Cardiovascular:   Hypertension    Renal/:   Chronic Renal Disease    Hepatic/GI:   Liver Disease,        Physical Exam  General: Well nourished, Cooperative, Alert and Oriented    Airway:  Mallampati: II   Mouth Opening: Normal  TM Distance: Normal  Tongue: Normal  Neck ROM: Normal ROM    Dental:  Intact    Chest/Lungs:  Clear to auscultation, Normal Respiratory Rate    Heart:  Rate: Normal  Rhythm: Regular Rhythm        Anesthesia Plan  Type of Anesthesia, risks & benefits discussed:    Anesthesia Type: Gen ETT  Intra-op Monitoring Plan: Standard ASA Monitors  Post Op Pain Control Plan: multimodal analgesia and IV/PO Opioids PRN  Induction:  IV  Airway Plan: Direct  Informed Consent: Informed consent signed with the Patient and all parties understand the risks and agree with anesthesia plan.  All questions answered.   ASA Score: 3  Day of Surgery Review of History & Physical: H&P Update  referred to the surgeon/provider.    Ready For Surgery From Anesthesia Perspective.     .

## 2022-10-20 NOTE — ANESTHESIA PROCEDURE NOTES
Intubation    Date/Time: 10/20/2022 4:17 PM  Performed by: Andre Olsen CRNA  Authorized by: Samir Barajas MD     Intubation:     Induction:  Intravenous    Intubated:  Postinduction    Mask Ventilation:  Easy mask    Attempts:  1    Attempted By:  CRNA    Method of Intubation:  Video laryngoscopy    Blade:  Stack 3    Laryngeal View Grade: Grade I - full view of cords      Difficult Airway Encountered?: No      Complications:  None    Airway Device:  Oral endotracheal tube    Airway Device Size:  7.5    Style/Cuff Inflation:  Cuffed    Inflation Amount (mL):  8    Tube secured:  22    Secured at:  The lips    Placement Verified By:  Capnometry and Revisualization with laryngoscopy    Complicating Factors:  None    Findings Post-Intubation:  BS equal bilateral and atraumatic/condition of teeth unchanged

## 2022-10-20 NOTE — H&P
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 His colon cancer was nearly obstructive and was stented 04/20/21 with Dr. Bonilla. He also recently underwent a diagnostic lap with washings to rule out carcinomatosis in preparation for a formal liver resection 09/07/22 with Dr. BUD Rodriguez.     Mr. Downs presents today for resection of his colon cancer.       Facility-Administered Medications Prior to Admission   Medication    sars-cov-2 (covid-19) 30 mcg/0.3 ml injection 0.3 mL     PTA Medications   Medication Sig    acetaminophen (TYLENOL) 500 MG tablet Take 1 tablet (500 mg total) by mouth every 6 (six) hours as needed for Pain (alternate with ibuprofen).    amLODIPine (NORVASC) 10 MG tablet Take 1 tablet (10 mg total) by mouth once daily.    ciprofloxacin HCl (CIPRO) 500 MG tablet Take 1 tablet (500 mg total) by mouth 2 (two) times daily.    ibuprofen (ADVIL,MOTRIN) 400 MG tablet Take 1 tablet (400 mg total) by mouth every 6 (six) hours as needed for Other (pain). Alternate with tylenol    metroNIDAZOLE (FLAGYL) 500 MG tablet Take 1 tablet (500 mg total) by mouth 2 (two) times daily.    ondansetron (ZOFRAN) 4 MG tablet Take 1 tablet (4 mg total) by mouth every 8 (eight) hours as needed for Nausea.    polyethylene glycol (GOLYTELY) 236-22.74-6.74 -5.86 gram suspension Take 4,000 mLs (4 L total) by mouth once. for 1 dose    ciprofloxacin HCl (CIPRO) 500 MG tablet Take 1 tablet (500 mg total) by  mouth 2 (two) times daily.    LIDOcaine-prilocaine (EMLA) cream Apply topically as needed (Apply one hour before treatment).    metroNIDAZOLE (FLAGYL) 500 MG tablet Take 1 tablet (500 mg total) by mouth 2 (two) times daily.       Review of patient's allergies indicates:  No Known Allergies    Past Medical History:   Diagnosis Date    MARIA A (acute kidney injury) 8/18/2022    Hypertension     Metastatic colon cancer to liver 4/22/2021     Past Surgical History:   Procedure Laterality Date    COLONOSCOPY N/A 4/20/2021    Procedure: COLONOSCOPY with possible stent;  Surgeon: EDILMA Bonilla MD;  Location: [facility] ENDO (2ND FLR);  Service: Colon and Rectal;  Laterality: N/A;    DIAGNOSTIC LAPAROSCOPY N/A 9/7/2022    Procedure: LAPAROSCOPY, DIAGNOSTIC;  Surgeon: Adrian Rodriguez MD;  Location: [facility] OR 2ND FLR;  Service: General;  Laterality: N/A;    INSERTION OF TUNNELED CENTRAL VENOUS CATHETER (CVC) WITH SUBCUTANEOUS PORT N/A 5/3/2021    Procedure: [id number];  Surgeon: Francisco Layton MD;  Location: [facility] OR 2ND FLR;  Service: Vascular;  Laterality: N/A;     Family History    None       Tobacco Use    Smoking status: Never    Smokeless tobacco: Never   Substance and Sexual Activity    Alcohol use: Yes    Drug use: Not on file    Sexual activity: Not on file     Review of Systems  Objective:     Vital Signs (Most Recent):  Temp: 98.1 °F (36.7 °C) (10/20/22 1004)  Pulse: 87 (10/20/22 1004)  Resp: 18 (10/20/22 1004)  BP: 126/77 (10/20/22 1004)  SpO2: 97 % (10/20/22 1004) Vital Signs (24h Range):  Temp:  [98.1 °F (36.7 °C)] 98.1 °F (36.7 °C)  Pulse:  [87] 87  Resp:  [18] 18  SpO2:  [97 %] 97 %  BP: (126)/(77) 126/77     Weight: 47.9 kg (105 lb 9.6 oz)  Body mass index is 17.57 kg/m².    Physical Exam  Constitutional:       Appearance: He is well-developed.   HENT:      Head: Normocephalic and atraumatic.      Nose: Nose normal.   Eyes:      Extraocular Movements: Extraocular movements intact.      Conjunctiva/sclera:  Conjunctivae normal.   Cardiovascular:      Rate and Rhythm: Normal rate.      Pulses: Normal pulses.   Pulmonary:      Effort: Pulmonary effort is normal.   Abdominal:      General: There is no distension.      Palpations: Abdomen is soft.      Tenderness: There is no abdominal tenderness.   Musculoskeletal:         General: Normal range of motion.      Cervical back: Normal range of motion and neck supple.   Skin:     General: Skin is warm and dry.      Capillary Refill: Capillary refill takes less than 2 seconds.   Neurological:      Mental Status: He is alert and oriented to person, place, and time.   Psychiatric:         Behavior: Behavior normal.       Assessment/Plan:     There are no hospital problems to display for this patient.      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

## 2022-10-20 NOTE — PLAN OF CARE
Pt prepped for surgery per orders. Family at bedside and to take belongings. Type and screen/ABO sent to blood bank. H&P update, surgery consent, blood consent, and anesthesia consent needed for surgery today.

## 2022-10-21 LAB
ALBUMIN SERPL BCP-MCNC: 2.4 G/DL (ref 3.5–5.2)
ALP SERPL-CCNC: 61 U/L (ref 55–135)
ALT SERPL W/O P-5'-P-CCNC: 12 U/L (ref 10–44)
ANION GAP SERPL CALC-SCNC: 7 MMOL/L (ref 8–16)
AST SERPL-CCNC: 41 U/L (ref 10–40)
BASOPHILS # BLD AUTO: 0.02 K/UL (ref 0–0.2)
BASOPHILS NFR BLD: 0.1 % (ref 0–1.9)
BILIRUB SERPL-MCNC: 0.6 MG/DL (ref 0.1–1)
BUN SERPL-MCNC: 4 MG/DL (ref 8–23)
CALCIUM SERPL-MCNC: 7.9 MG/DL (ref 8.7–10.5)
CHLORIDE SERPL-SCNC: 102 MMOL/L (ref 95–110)
CO2 SERPL-SCNC: 25 MMOL/L (ref 23–29)
CREAT SERPL-MCNC: 0.7 MG/DL (ref 0.5–1.4)
DIFFERENTIAL METHOD: ABNORMAL
EOSINOPHIL # BLD AUTO: 0 K/UL (ref 0–0.5)
EOSINOPHIL NFR BLD: 0 % (ref 0–8)
ERYTHROCYTE [DISTWIDTH] IN BLOOD BY AUTOMATED COUNT: 15 % (ref 11.5–14.5)
EST. GFR  (NO RACE VARIABLE): >60 ML/MIN/1.73 M^2
GLUCOSE SERPL-MCNC: 231 MG/DL (ref 70–110)
HCT VFR BLD AUTO: 32.8 % (ref 40–54)
HGB BLD-MCNC: 10.4 G/DL (ref 14–18)
IMM GRANULOCYTES # BLD AUTO: 0.06 K/UL (ref 0–0.04)
IMM GRANULOCYTES NFR BLD AUTO: 0.4 % (ref 0–0.5)
LYMPHOCYTES # BLD AUTO: 1.2 K/UL (ref 1–4.8)
LYMPHOCYTES NFR BLD: 7.7 % (ref 18–48)
MAGNESIUM SERPL-MCNC: 1.7 MG/DL (ref 1.6–2.6)
MCH RBC QN AUTO: 28.6 PG (ref 27–31)
MCHC RBC AUTO-ENTMCNC: 31.7 G/DL (ref 32–36)
MCV RBC AUTO: 90 FL (ref 82–98)
MONOCYTES # BLD AUTO: 0.4 K/UL (ref 0.3–1)
MONOCYTES NFR BLD: 2.7 % (ref 4–15)
NEUTROPHILS # BLD AUTO: 13.7 K/UL (ref 1.8–7.7)
NEUTROPHILS NFR BLD: 89.1 % (ref 38–73)
NRBC BLD-RTO: 0 /100 WBC
PHOSPHATE SERPL-MCNC: 2.9 MG/DL (ref 2.7–4.5)
PLATELET # BLD AUTO: 390 K/UL (ref 150–450)
PMV BLD AUTO: 9.6 FL (ref 9.2–12.9)
POTASSIUM SERPL-SCNC: 3.9 MMOL/L (ref 3.5–5.1)
PROT SERPL-MCNC: 5.8 G/DL (ref 6–8.4)
RBC # BLD AUTO: 3.64 M/UL (ref 4.6–6.2)
SODIUM SERPL-SCNC: 134 MMOL/L (ref 136–145)
WBC # BLD AUTO: 15.41 K/UL (ref 3.9–12.7)

## 2022-10-21 PROCEDURE — 83735 ASSAY OF MAGNESIUM: CPT | Performed by: STUDENT IN AN ORGANIZED HEALTH CARE EDUCATION/TRAINING PROGRAM

## 2022-10-21 PROCEDURE — 36415 COLL VENOUS BLD VENIPUNCTURE: CPT | Performed by: STUDENT IN AN ORGANIZED HEALTH CARE EDUCATION/TRAINING PROGRAM

## 2022-10-21 PROCEDURE — 25000003 PHARM REV CODE 250: Performed by: STUDENT IN AN ORGANIZED HEALTH CARE EDUCATION/TRAINING PROGRAM

## 2022-10-21 PROCEDURE — 80053 COMPREHEN METABOLIC PANEL: CPT | Performed by: STUDENT IN AN ORGANIZED HEALTH CARE EDUCATION/TRAINING PROGRAM

## 2022-10-21 PROCEDURE — 84100 ASSAY OF PHOSPHORUS: CPT | Performed by: STUDENT IN AN ORGANIZED HEALTH CARE EDUCATION/TRAINING PROGRAM

## 2022-10-21 PROCEDURE — 25000003 PHARM REV CODE 250: Performed by: NURSE PRACTITIONER

## 2022-10-21 PROCEDURE — 20600001 HC STEP DOWN PRIVATE ROOM

## 2022-10-21 PROCEDURE — 85025 COMPLETE CBC W/AUTO DIFF WBC: CPT | Performed by: STUDENT IN AN ORGANIZED HEALTH CARE EDUCATION/TRAINING PROGRAM

## 2022-10-21 PROCEDURE — 63600175 PHARM REV CODE 636 W HCPCS: Performed by: STUDENT IN AN ORGANIZED HEALTH CARE EDUCATION/TRAINING PROGRAM

## 2022-10-21 RX ORDER — TAMSULOSIN HYDROCHLORIDE 0.4 MG/1
0.8 CAPSULE ORAL DAILY
Status: DISCONTINUED | OUTPATIENT
Start: 2022-10-21 | End: 2022-11-02 | Stop reason: HOSPADM

## 2022-10-21 RX ORDER — ENOXAPARIN SODIUM 100 MG/ML
40 INJECTION SUBCUTANEOUS EVERY 24 HOURS
Status: DISCONTINUED | OUTPATIENT
Start: 2022-10-21 | End: 2022-10-22

## 2022-10-21 RX ADMIN — GABAPENTIN 300 MG: 300 CAPSULE ORAL at 08:10

## 2022-10-21 RX ADMIN — ALVIMOPAN 12 MG: 12 CAPSULE ORAL at 08:10

## 2022-10-21 RX ADMIN — TAMSULOSIN HYDROCHLORIDE 0.8 MG: 0.4 CAPSULE ORAL at 09:10

## 2022-10-21 RX ADMIN — DEXTROSE MONOHYDRATE, SODIUM CHLORIDE, AND POTASSIUM CHLORIDE: 50; 9; 1.49 INJECTION, SOLUTION INTRAVENOUS at 12:10

## 2022-10-21 RX ADMIN — DEXTROSE MONOHYDRATE, SODIUM CHLORIDE, AND POTASSIUM CHLORIDE: 50; 9; 1.49 INJECTION, SOLUTION INTRAVENOUS at 04:10

## 2022-10-21 RX ADMIN — MUPIROCIN: 20 OINTMENT TOPICAL at 08:10

## 2022-10-21 RX ADMIN — ENOXAPARIN SODIUM 40 MG: 100 INJECTION SUBCUTANEOUS at 05:10

## 2022-10-21 RX ADMIN — GABAPENTIN 300 MG: 300 CAPSULE ORAL at 02:10

## 2022-10-21 RX ADMIN — AMLODIPINE BESYLATE 10 MG: 10 TABLET ORAL at 08:10

## 2022-10-21 NOTE — BRIEF OP NOTE
"Orthopedic Surgery Progress Note   July 7, 2020    Subjective: No acute events overnight. Pain well controlled. Tolerating diet. Voiding spontaneously. Got up last evening, felt a little dizzy. Also noted some RLE foot numbness post-op, improving overnight and now resolved this morning. Denies weakness in RLE. Discussed post-op plan, possible discharge today vs tomorrow pending progress with PT.     Objective: /55 (BP Location: Left arm)   Pulse 70   Temp 98.7  F (37.1  C) (Oral)   Resp 8   Ht 1.575 m (5' 2\")   Wt 83.3 kg (183 lb 10.3 oz)   LMP 01/08/2008   SpO2 96%   BMI 33.59 kg/m      General: NAD, alert and oriented, cooperative with exam.   Cardio: RRR, extremities wwp.   Respiratory: Non-labored breathing.  MSK: Focused examination of RLE: Toes wwp, DP and PT 2+, bcr in all toes. +EHL/FHL/GSC/TA with 5/5 strength. SILT SP/DP/Sa/Mazariegos/T. Dressing c/d/i.    Labs: Hgb 10.6    Imaging: POD0 pelvis XR reviewed. New R DENIZ in place, well seated, no obvious fracture, hip located.     Assessment and Plan: Angela Ibarra is a 60 year old female s/p Procedure(s):  ARTHROPLASTY, HIP, TOTAL RIGHT on 7/6/2020 with Dr. Deras.     Activity: Up with assist and assistive devices as needed until independent. Posterior hip precautions to operative side x 3 months:  1) No hip flexion beyond 90 degrees  2) No adduction beyond midline  3) No internal rotation beyond neutral   Weight bearing status: WBAT  Antibiotics: Cefazolin x 24 hours  Diet: Begin with clear fluids and progress diet as tolerated. Bowel regimen. Anti-emetics PRN.    DVT prophylaxis: Mechanical while in hospital with ASA 162mg daily x4 weeks  Elevation: Elevate heels off of bed on pillows   Wound Care: Aquacel x 7 days.    Drains: none  Pain management: Orals PRN, IV for breakthrough only  X-rays: AP Pelvis and Lateral operative hip in PACU.   Physical Therapy: Mobilization, ROM, ADL's, Posterior hip precautions.    Occupational Therapy: ADL's  Labs: " [facility] - Surgery ([facility])  Brief Operative Note    SUMMARY     Surgery Date: 10/20/2022     Surgeon(s) and Role:     * EDILMA Montenegro MD - Primary     * Blanka Horvath MD - Resident - Assisting     * Florentin Coto MD - Resident - Assisting        Pre-op Diagnosis:  Colon cancer metastasized to liver [C18.9, C78.7]    Post-op Diagnosis:  Post-Op Diagnosis Codes:     * Colon cancer metastasized to liver [C18.9, C78.7]    Procedure(s) (LRB):  HEMICOLECTOMY, RIGHT, extended (N/A)  OMENTECTOMY (N/A)    Anesthesia: General    Operative Findings:   Extended Right hemicolectomy with end to side ileocolic anastomosis    Estimated Blood Loss: 200 mL    Estimated Blood Loss has been documented.         Specimens:   Specimen (24h ago, onward)       Start     Ordered    10/20/22 1913  Specimen to Pathology, Surgery General Surgery  Once        Comments: Pre-op Diagnosis: Colon cancer metastasized to liver [C18.9, C78.7]Procedure(s):HEMICOLECTOMY, RIGHT, extendedOMENTECTOMY Number of specimens: 3Name of specimens: #1 right & transverse colon with omentum-perm#2 proximal donut #3 distal donut     References:    Click here for ordering Quick Tip   Question Answer Comment   Procedure Type: General Surgery    Which provider would you like to cc? EDILMA MONTENEGRO    Release to patient Immediate        10/20/22 1914    10/20/22 1851  Specimen to Pathology, Surgery General Surgery  Once        Comments: Pre-op Diagnosis: Colon cancer metastasized to liver [C18.9, C78.7]Procedure(s):HEMICOLECTOMY, RIGHT Number of specimens: 1Name of specimens: right & transverse colon w/omentum- perm.     References:    Click here for ordering Quick Tip   Question Answer Comment   Procedure Type: General Surgery    Which provider would you like to cc? EDILMA MONTENEGRO    Release to patient Immediate        10/20/22 1852                    [id number]       Trend Hgb on POD #1, 2  Consults: PT, OT. Hospitalist, appreciate assistance in caring for this patient throughout the perioperative period            Future Appointments   Date Time Provider Department [place]   7/7/2020  6:30 AM UR OT OVERFLOW UROT [place]   7/7/2020  9:45 AM Bonnie Morgan Pt, PT URPT [place]   7/7/2020  2:00 PM Bonnie Morgan Pt, PT URPT [place]   7/21/2020  9:30 AM MG NURSE ONLY ORTHO [facility] [place]   8/18/2020  9:45 AM Clinton Deras MD [facility]         Disposition: Pending progress with therapies, pain control on orals, and medical stability, anticipate discharge to Home on POD #1-2.       Balwinder Walters MD  Orthopedic Surgery PGY-4  Pager: [phone]

## 2022-10-21 NOTE — OP NOTE
Date of procedure:   October 20, 2022    Indications for procedure:  73 yo male with stage IV transverse colon cancer with liver metastases.  Primary tumor palliatively treated with endoluminal stent.  Pt underwent chemotherapy for 18 months and has developed recurrent large bowel obstruction at primary site due to regrowth.  Liver lesions experienced downstaging and may be amenable to resection.  Pt discussed at MDT and stage resection recommended.      Preoperative diagnosis:   transverse colon cancer, large bowel obstruction    Postoperative diagnosis:  same    Name of procedure:  extended right colectomy, en bloc omentectomy, splenic flexure mobilization.    Surgeon:   EDILMA Bonilla MD    Assistant surgeon:  Florentin Coto MD    Type of anesthesia:  GETA    EBL:  200  Cc's    Drains:  none    Specimen:  right and transverse colon, omentum, anastomotic donuts    Findings:  1. Transverse colon cancer directly involving greater omentum.   2.  Significant adenopathy along SMA.    3.  Anastomosis of the neoterminal ileum and the distal transverse colon via end ileum to side of transverse colon (29 CDH stapler).    4.  Large right lobe of liver metastasis.  No peritoneal disease    Technique in detail:  The patient was brought to the operating room positively identified and placed on the operating table in supine position with sequential compression devices on his lower extremities.  The patient had undergone an outpatient oral mechanical and oral antibiotic bowel preparation.  He received intravenous antibiotics.  He underwent general endotracheal anesthesia without complication.  The patient's abdomen and perineum were prepared and draped in usual fashion.  A critical time-out was performed.      The patient was explored through a midline incision which extended from the epigastrium through the umbilicus into the hypogastrium.  Linea alba was opened along the length of the skin incision.  Upon opening the abdomen  there was no evidence of any peritoneal disease.  No ascites was encountered.  The patient was noted to have a large mass of the mid transverse colon with involvement of the omentum.  Modest dilation of the colon was noted proximally.  Exploration of the abdomen was performed and was notable for a large mass of the right hepatic lobe consistent with preoperative imaging.  Patient was noted to have evidence of bulky mesenteric adenopathy along the ileocolic artery chain as well as the transverse colon mesentery.      Given the position or location of the tumor extended right colectomy was undertaken.  We would save the transverse colon and its mesentery as the last portion of the dissection.  Initially we mobilized the sigmoid and left colon along the white line of Toldt.  The retroperitoneal structures were carefully preserved from harm.  The splenic flexure was complete mobilized off of the retroperitoneum back to level of the ligament of Treitz.  No vessels were taken.  Similarly, the right colon and cecum and root of the mesentery were dissected along the lateral aspects and lateral to medial dissection was undertaken with care being taken to avoid injury to the retroperitoneal structures.  The sweep of the duodenum was carefully preserved from harm.  The hepatic flexure was taken down under direct vision   the omentum was taken on block with specimen dividing the gastrocolic ligament at the greater curvature of the stomach dividing the gastroepiploic artery given the direct invasion of the omentum by the tumor and given palpable adenopathy as we coursed from left to right in the region of the takeoff of the right gastroepiploic artery off of the gastroduodenal artery.  This vessel was ligated between clamps and suture ligated.  The undersurface of the transverse colon mesentery and the ligament of Treitz were careful divided dropping the duodenum off of the transverse colon mesentery and care being taken to  avoid injury to the superior mesenteric vessels.  We then proceeded with high ligation of the ileocolic artery, right colic artery and the middle colic pedicle between clamps and suture ligation.  The bowel was transected proximally 15 cm proximal to the ileocecal valve using the pursestring clamp placed proximally and a crushing clamp distally.  2-0 Monosof suture on a Luis needle was used to fashion the pursestring.  A 29 CDH anvil was placed into the bowel proximally in the pursestring suture was securely tied down.      Distally the mesentery of the distal 3rd of the transverse mesocolon was divided out to the marginal artery of Martin where the vessel was carefully isolated transected and allowed to bleed freely.  Pulsatile arterial confirmed adequate perfusion of the bowel.  bowel was transected in the distal 3rd of the transverse colon 15 cm distal to the palpable tumor.  We placed a noncrushing bowel clamps across the bowel and transected the bowel with a knife.  The specimen was handed off the operative field.  The open end of the transverse colon was then controlled with Allis clamps.  The CDH stapler was placed through the open end of bowel and the spike was extruded along the anti mesenteric border of the bowel 3-4 cm distal to the cut edge.  The small bowel was brought into proximity with care being taken to properly orient the mesentery.  The instrument was mated, cinched down and fired in routine fashion with recovery of 2 complete anastomotic donuts which were sent separately to Surgical pathology.  A Tx 60 staple was brought onto the field and states applied to the open end of colon closing the bowel.  A trim of colon was sent to Surgical pathology.  This staple line was then inverted using continuous 2-0 Vicryl suture.  There was no tension on the anastomosis again care was taken to properly orient the mesentery.  The mesenteric defect was closed using continuous 3-0 Vicryl suture.  The small  bowel was returned to its anatomic position.    We changed gown and gloves.  A separate closing tray was employed for closure of the abdomen.  Transversus abdominis plane block was performed.  Linea alba was approximated using continuous looped PDS suture.   Internal retention sutures1 Vicryl were placed every 3 cm.  The skin was approximated using subcuticular Monocryl and skin glue.    The patient was awaken from anesthesia, extubated without incident and returned to the recovery area in stable condition.

## 2022-10-21 NOTE — PLAN OF CARE
Problem: Fluid and Electrolyte Imbalance (Acute Kidney Injury/Impairment)  Goal: Fluid and Electrolyte Balance  Outcome: Ongoing, Progressing     Problem: Adult Inpatient Plan of Care  Goal: Plan of Care Review  Outcome: Ongoing, Progressing  Goal: Patient-Specific Goal (Individualized)  Outcome: Ongoing, Progressing  Goal: Absence of Hospital-Acquired Illness or Injury  Outcome: Ongoing, Progressing  Goal: Optimal Comfort and Wellbeing  Outcome: Ongoing, Progressing  Goal: Readiness for Transition of Care  Outcome: Ongoing, Progressing     Problem: Infection  Goal: Absence of Infection Signs and Symptoms  Outcome: Ongoing, Progressing

## 2022-10-21 NOTE — PLAN OF CARE
[facility]  Initial Discharge Assessment       Primary Care Provider: Renetta García MD    Admission Diagnosis: Colon cancer metastasized to liver [C18.9, C78.7]  Colon cancer [C18.9]    Admission Date: 10/20/2022  Expected Discharge Date: 10/24/2022    Discharge Barriers Identified: None    Payor: MEDICARE / Plan: MEDICARE PART A & B / Product Type: Government /     Extended Emergency Contact Information  Primary Emergency Contact: Carrie Downs  Mobile Phone: [phone]  Relation: Daughter    Discharge Plan A: Home with family  Discharge Plan B: Home      [facility]  [address] [address]  Phone: [phone] Fax: [phone]    [facility]  [address]  [place] [address]  Phone: [phone] Fax: [phone]      Initial Assessment (most recent)       Adult Discharge Assessment - 10/21/22 1448          Discharge Assessment    Assessment Type Discharge Planning Brief Assessment     Confirmed/corrected address, phone number and insurance Yes     Confirmed Demographics Correct on Facesheet     Source of Information patient     When was your last doctors appointment? --   Pt staed he was unsure    Does patient/caregiver understand observation status Yes     Communicated JOSEFA with patient/caregiver Yes     Reason For Admission Colon Cancer     Lives With alone     Facility Arrived From: Home     Do you expect to return to your current living situation? Yes     Do you have help at home or someone to help you manage your care at home? Yes     Who are your caregiver(s) and their phone number(s)? Carrie-[phone]     Prior to hospitilization cognitive status: Alert/Oriented     Current cognitive status: Alert/Oriented     Walking or Climbing Stairs Difficulty none     Dressing/Bathing Difficulty none     Equipment Currently Used at Home none     Readmission within 30 days? No     Patient currently being followed by outpatient case  management? No     Do you currently have service(s) that help you manage your care at home? No     Is the pt/caregiver preference to resume services with current agency Yes     Do you take prescription medications? Yes     Do you have prescription coverage? Yes     Coverage Medicare     Do you have any problems affording any of your prescribed medications? No     Who is going to help you get home at discharge? [phone]     How do you get to doctors appointments? family or friend will provide     Are you on dialysis? No     Do you take coumadin? No     Discharge Plan A Home with family     Discharge Plan B Home     DME Needed Upon Discharge  other (see comments)   Unknown at this time    Discharge Plan discussed with: Patient     Discharge Barriers Identified None                       Spoke to pt. Pt lives at home with family. Post hospital  stay [phone] will be pt support person and pt. has transportation at d/c with daughter. There have been no hospitalizations within the last 30 days per pt and pt. Verified pt PCP and preferred pharmacy. Pt stated not on Coumadin and is not receiving dialysis. All questions answered regarding case management/ discharge planning , pt verbalized understanding. Discharge booklet with SW contact information given to pt.       Magda Tovar LCSW  Case Management/[facility]  [phone]

## 2022-10-21 NOTE — ASSESSMENT & PLAN NOTE
Javier Martinez is a 72 y.o. M s/p extended right hemicolectomy.     Plan:   - Continue CLD   - PCA and MM for pain control   - flomax this morning   - possibly d/c [facility] this afternoon   - PRN nausea control

## 2022-10-21 NOTE — PLAN OF CARE
Problem: Fluid and Electrolyte Imbalance (Acute Kidney Injury/Impairment)  Goal: Fluid and Electrolyte Balance  Outcome: Ongoing, Progressing     Problem: Oral Intake Inadequate (Acute Kidney Injury/Impairment)  Goal: Optimal Nutrition Intake  Outcome: Ongoing, Progressing     Problem: Renal Function Impairment (Acute Kidney Injury/Impairment)  Goal: Effective Renal Function  Outcome: Ongoing, Progressing     Problem: Adult Inpatient Plan of Care  Goal: Plan of Care Review  Outcome: Ongoing, Progressing  Goal: Patient-Specific Goal (Individualized)  Outcome: Ongoing, Progressing     Problem: Infection  Goal: Absence of Infection Signs and Symptoms  Outcome: Ongoing, Progressing     Problem: Skin Injury Risk Increased  Goal: Skin Health and Integrity  Outcome: Ongoing, Progressing

## 2022-10-21 NOTE — SUBJECTIVE & OBJECTIVE
Subjective:     Interval History: NAEO. VSS. Patient complaing of abdominal pain, but has not been pressing his pain button. Otherwise, doing well without nausea or vomiting. Tolerating CLD.    Post-Op Info:  Procedure(s) (LRB):  HEMICOLECTOMY, RIGHT, extended (N/A)  OMENTECTOMY (N/A)   1 Day Post-Op      Medications:  Continuous Infusions:   sodium chloride 0.9%      dextrose 5 % and 0.9 % NaCl with KCl 20 mEq 125 mL/hr at 10/21/22 0419    hydromorphone in 0.9 % NaCl 6 mg/30 ml       Scheduled Meds:   alvimopan  12 mg Oral BID    amLODIPine  10 mg Oral Daily    gabapentin  300 mg Oral TID    mupirocin   Nasal BID    tamsulosin  0.8 mg Oral Daily     PRN Meds:   LIDOcaine (PF) 10 mg/ml (1%)    naloxone    ondansetron        Objective:     Vital Signs (Most Recent):  Temp: 97.3 °F (36.3 °C) (10/21/22 0741)  Pulse: 80 (10/21/22 0831)  Resp: 18 (10/21/22 0741)  BP: 137/84 (10/21/22 0741)  SpO2: 96 % (10/21/22 0741) Vital Signs (24h Range):  Temp:  [97.2 °F (36.2 °C)-99 °F (37.2 °C)] 97.3 °F (36.3 °C)  Pulse:  [74-85] 80  Resp:  [14-25] 18  SpO2:  [96 %-100 %] 96 %  BP: (112-143)/(64-85) 137/84     Intake/Output - Last 3 Shifts         10/19 0700  10/20 0659 10/20 0700  10/21 0659 10/21 0700  10/22 0659    P.O.  200     I.V. (mL/kg)  2300 (48)     IV Piggyback  150     Total Intake(mL/kg)  2650 (55.3)     Urine (mL/kg/hr)  510 200 (1)    Stool   0    Blood  200     Total Output  710 200    Net  +1940 -200           Stool Occurrence   0 x            Physical Exam  HENT:      Mouth/Throat:      Mouth: Mucous membranes are moist.   Cardiovascular:      Rate and Rhythm: Normal rate.   Pulmonary:      Effort: Pulmonary effort is normal.   Abdominal:      General: There is distension.      Palpations: Abdomen is soft.      Tenderness: There is abdominal tenderness.   Neurological:      General: No focal deficit present.      Mental Status: He is alert.       Significant Labs:  BMP (Last 3 Results):   Recent Labs   Lab  10/21/22  0433   *   *   K 3.9      CO2 25   BUN 4*   CREATININE 0.7   CALCIUM 7.9*   MG 1.7     CBC (Last 3 Results):   Recent Labs   Lab 10/21/22  0433   WBC 15.41*   RBC 3.64*   HGB 10.4*   HCT 32.8*      MCV 90   MCH 28.6   MCHC 31.7*       Significant Diagnostics:  I have reviewed all pertinent imaging results/findings within the past 24 hours.

## 2022-10-21 NOTE — TRANSFER OF CARE
Anesthesia Transfer of Care Note    Patient: Javier Downs    Procedure(s) Performed: Procedure(s) (LRB):  HEMICOLECTOMY, RIGHT, extended (N/A)  OMENTECTOMY (N/A)    Patient location: PACU    Anesthesia Type: general    Transport from OR: Transported from OR on 6-10 L/min O2 by face mask with adequate spontaneous ventilation    Post pain: adequate analgesia    Post assessment: no apparent anesthetic complications and tolerated procedure well    Post vital signs: stable    Level of consciousness: awake    Nausea/Vomiting: no nausea/vomiting    Complications: none    Transfer of care protocol was followed      Last vitals:   Visit Vitals  /72 (BP Location: Right arm, Patient Position: Lying)   Pulse 75   Temp 36.2 °C (97.2 °F) (Temporal)   Resp (!) 22   Wt 47.9 kg (105 lb 9.6 oz)   SpO2 100%   BMI 17.57 kg/m²

## 2022-10-21 NOTE — ANESTHESIA POSTPROCEDURE EVALUATION
Anesthesia Post Evaluation    Patient: Javier Downs    Procedure(s) Performed: Procedure(s) (LRB):  HEMICOLECTOMY, RIGHT, extended (N/A)  OMENTECTOMY (N/A)    Final Anesthesia Type: general      Patient location during evaluation: PACU  Patient participation: Yes- Able to Participate  Level of consciousness: awake and alert  Post-procedure vital signs: reviewed and stable  Pain management: adequate  Airway patency: patent    PONV status at discharge: No PONV  Anesthetic complications: no      Cardiovascular status: hemodynamically stable  Respiratory status: spontaneous ventilation  Follow-up not needed.          Vitals Value Taken Time   /68 10/20/22 2031   Temp 36.2 °C (97.2 °F) 10/20/22 1932   Pulse 75 10/20/22 2040   Resp 20 10/20/22 2040   SpO2 98 % 10/20/22 2040   Vitals shown include unvalidated device data.      No case tracking events are documented in the log.      Pain/Jill Score: Pain Rating Prior to Med Admin: 7 (10/20/2022  8:03 PM)  Jill Score: 7 (10/20/2022  8:00 PM)

## 2022-10-21 NOTE — NURSING TRANSFER
Nursing Transfer Note      10/20/2022     Reason patient is being transferred: post procedure    Transfer To: [place] [id number]    Transfer via stretcher    Transfer with IVF and dilaudid PCA    Transported by transporters x1    Medicines sent: n/a    Any special needs or follow-up needed: routine    Chart send with patient: Yes    Notified: daughter    Patient reassessed at: 10/20/2022 at 2130

## 2022-10-21 NOTE — PROGRESS NOTES
[facility]  Colorectal Surgery  Progress Note    Patient Name: Javier Downs  MRN: [id number]  Admission Date: 10/20/2022  Hospital Length of Stay: 1 days  Attending Physician: EDILMA Bonilla MD    Subjective:     Interval History: NAEO. VSS. Patient complaing of abdominal pain, but has not been pressing his pain button. Otherwise, doing well without nausea or vomiting. Tolerating CLD.    Post-Op Info:  Procedure(s) (LRB):  HEMICOLECTOMY, RIGHT, extended (N/A)  OMENTECTOMY (N/A)   1 Day Post-Op      Medications:  Continuous Infusions:   sodium chloride 0.9%      dextrose 5 % and 0.9 % NaCl with KCl 20 mEq 125 mL/hr at 10/21/22 0419    hydromorphone in 0.9 % NaCl 6 mg/30 ml       Scheduled Meds:   alvimopan  12 mg Oral BID    amLODIPine  10 mg Oral Daily    gabapentin  300 mg Oral TID    mupirocin   Nasal BID    tamsulosin  0.8 mg Oral Daily     PRN Meds:   LIDOcaine (PF) 10 mg/ml (1%)    naloxone    ondansetron        Objective:     Vital Signs (Most Recent):  Temp: 97.3 °F (36.3 °C) (10/21/22 0741)  Pulse: 80 (10/21/22 0831)  Resp: 18 (10/21/22 0741)  BP: 137/84 (10/21/22 0741)  SpO2: 96 % (10/21/22 0741) Vital Signs (24h Range):  Temp:  [97.2 °F (36.2 °C)-99 °F (37.2 °C)] 97.3 °F (36.3 °C)  Pulse:  [74-85] 80  Resp:  [14-25] 18  SpO2:  [96 %-100 %] 96 %  BP: (112-143)/(64-85) 137/84     Intake/Output - Last 3 Shifts         10/19 0700  10/20 0659 10/20 0700  10/21 0659 10/21 0700  10/22 0659    P.O.  200     I.V. (mL/kg)  2300 (48)     IV Piggyback  150     Total Intake(mL/kg)  2650 (55.3)     Urine (mL/kg/hr)  510 200 (1)    Stool   0    Blood  200     Total Output  710 200    Net  +1940 -200           Stool Occurrence   0 x            Physical Exam  HENT:      Mouth/Throat:      Mouth: Mucous membranes are moist.   Cardiovascular:      Rate and Rhythm: Normal rate.   Pulmonary:      Effort: Pulmonary effort is normal.   Abdominal:      General: There is distension.      Palpations: Abdomen is  soft.      Tenderness: There is abdominal tenderness.   Neurological:      General: No focal deficit present.      Mental Status: He is alert.       Significant Labs:  BMP (Last 3 Results):   Recent Labs   Lab 10/21/22  0433   *   *   K 3.9      CO2 25   BUN 4*   CREATININE 0.7   CALCIUM 7.9*   MG 1.7     CBC (Last 3 Results):   Recent Labs   Lab 10/21/22  0433   WBC 15.41*   RBC 3.64*   HGB 10.4*   HCT 32.8*      MCV 90   MCH 28.6   MCHC 31.7*       Significant Diagnostics:  I have reviewed all pertinent imaging results/findings within the past 24 hours.    Assessment/Plan:     Colonic mass  Javier Martinez is a 72 y.o. M s/p extended right hemicolectomy.     Plan:   - Continue CLD   - PCA and MM for pain control   - flomax this morning   - possibly d/c emerson this afternoon   - PRN nausea control           Blanka Horvath MD  Colorectal Surgery  [facility]

## 2022-10-22 LAB
ALBUMIN SERPL BCP-MCNC: 2.3 G/DL (ref 3.5–5.2)
ALP SERPL-CCNC: 65 U/L (ref 55–135)
ALT SERPL W/O P-5'-P-CCNC: 11 U/L (ref 10–44)
ANION GAP SERPL CALC-SCNC: 7 MMOL/L (ref 8–16)
AST SERPL-CCNC: 28 U/L (ref 10–40)
BASOPHILS # BLD AUTO: 0.02 K/UL (ref 0–0.2)
BASOPHILS NFR BLD: 0.1 % (ref 0–1.9)
BILIRUB SERPL-MCNC: 0.3 MG/DL (ref 0.1–1)
BUN SERPL-MCNC: <3 MG/DL (ref 8–23)
CALCIUM SERPL-MCNC: 8.5 MG/DL (ref 8.7–10.5)
CHLORIDE SERPL-SCNC: 106 MMOL/L (ref 95–110)
CO2 SERPL-SCNC: 24 MMOL/L (ref 23–29)
CREAT SERPL-MCNC: 0.6 MG/DL (ref 0.5–1.4)
DIFFERENTIAL METHOD: ABNORMAL
EOSINOPHIL # BLD AUTO: 0 K/UL (ref 0–0.5)
EOSINOPHIL NFR BLD: 0.1 % (ref 0–8)
ERYTHROCYTE [DISTWIDTH] IN BLOOD BY AUTOMATED COUNT: 15 % (ref 11.5–14.5)
EST. GFR  (NO RACE VARIABLE): >60 ML/MIN/1.73 M^2
GLUCOSE SERPL-MCNC: 136 MG/DL (ref 70–110)
HCT VFR BLD AUTO: 35.6 % (ref 40–54)
HGB BLD-MCNC: 11.3 G/DL (ref 14–18)
IMM GRANULOCYTES # BLD AUTO: 0.05 K/UL (ref 0–0.04)
IMM GRANULOCYTES NFR BLD AUTO: 0.3 % (ref 0–0.5)
LYMPHOCYTES # BLD AUTO: 1.9 K/UL (ref 1–4.8)
LYMPHOCYTES NFR BLD: 13.1 % (ref 18–48)
MAGNESIUM SERPL-MCNC: 1.7 MG/DL (ref 1.6–2.6)
MCH RBC QN AUTO: 28.7 PG (ref 27–31)
MCHC RBC AUTO-ENTMCNC: 31.7 G/DL (ref 32–36)
MCV RBC AUTO: 90 FL (ref 82–98)
MONOCYTES # BLD AUTO: 1.5 K/UL (ref 0.3–1)
MONOCYTES NFR BLD: 10.3 % (ref 4–15)
NEUTROPHILS # BLD AUTO: 11.2 K/UL (ref 1.8–7.7)
NEUTROPHILS NFR BLD: 76.1 % (ref 38–73)
NRBC BLD-RTO: 0 /100 WBC
PHOSPHATE SERPL-MCNC: 1.9 MG/DL (ref 2.7–4.5)
PLATELET # BLD AUTO: 475 K/UL (ref 150–450)
PMV BLD AUTO: 9.1 FL (ref 9.2–12.9)
POTASSIUM SERPL-SCNC: 5.2 MMOL/L (ref 3.5–5.1)
PROT SERPL-MCNC: 6.1 G/DL (ref 6–8.4)
RBC # BLD AUTO: 3.94 M/UL (ref 4.6–6.2)
SODIUM SERPL-SCNC: 137 MMOL/L (ref 136–145)
WBC # BLD AUTO: 14.66 K/UL (ref 3.9–12.7)

## 2022-10-22 PROCEDURE — 25000003 PHARM REV CODE 250

## 2022-10-22 PROCEDURE — 84100 ASSAY OF PHOSPHORUS: CPT | Performed by: STUDENT IN AN ORGANIZED HEALTH CARE EDUCATION/TRAINING PROGRAM

## 2022-10-22 PROCEDURE — 63600175 PHARM REV CODE 636 W HCPCS: Performed by: STUDENT IN AN ORGANIZED HEALTH CARE EDUCATION/TRAINING PROGRAM

## 2022-10-22 PROCEDURE — 83735 ASSAY OF MAGNESIUM: CPT | Performed by: STUDENT IN AN ORGANIZED HEALTH CARE EDUCATION/TRAINING PROGRAM

## 2022-10-22 PROCEDURE — 85025 COMPLETE CBC W/AUTO DIFF WBC: CPT | Performed by: STUDENT IN AN ORGANIZED HEALTH CARE EDUCATION/TRAINING PROGRAM

## 2022-10-22 PROCEDURE — 36415 COLL VENOUS BLD VENIPUNCTURE: CPT | Performed by: STUDENT IN AN ORGANIZED HEALTH CARE EDUCATION/TRAINING PROGRAM

## 2022-10-22 PROCEDURE — 20600001 HC STEP DOWN PRIVATE ROOM

## 2022-10-22 PROCEDURE — 25000003 PHARM REV CODE 250: Performed by: STUDENT IN AN ORGANIZED HEALTH CARE EDUCATION/TRAINING PROGRAM

## 2022-10-22 PROCEDURE — 25000003 PHARM REV CODE 250: Performed by: NURSE PRACTITIONER

## 2022-10-22 PROCEDURE — 80053 COMPREHEN METABOLIC PANEL: CPT | Performed by: STUDENT IN AN ORGANIZED HEALTH CARE EDUCATION/TRAINING PROGRAM

## 2022-10-22 RX ORDER — METHOCARBAMOL 500 MG/1
500 TABLET, FILM COATED ORAL 3 TIMES DAILY
Status: DISCONTINUED | OUTPATIENT
Start: 2022-10-22 | End: 2022-11-02 | Stop reason: HOSPADM

## 2022-10-22 RX ORDER — OXYCODONE HYDROCHLORIDE 5 MG/1
5 TABLET ORAL EVERY 6 HOURS PRN
Status: DISCONTINUED | OUTPATIENT
Start: 2022-10-22 | End: 2022-10-26

## 2022-10-22 RX ORDER — OXYCODONE HYDROCHLORIDE 10 MG/1
10 TABLET ORAL EVERY 6 HOURS PRN
Status: DISCONTINUED | OUTPATIENT
Start: 2022-10-22 | End: 2022-10-26

## 2022-10-22 RX ORDER — HYDROMORPHONE HYDROCHLORIDE 1 MG/ML
0.2 INJECTION, SOLUTION INTRAMUSCULAR; INTRAVENOUS; SUBCUTANEOUS EVERY 4 HOURS PRN
Status: DISCONTINUED | OUTPATIENT
Start: 2022-10-22 | End: 2022-11-02 | Stop reason: HOSPADM

## 2022-10-22 RX ORDER — ENOXAPARIN SODIUM 100 MG/ML
30 INJECTION SUBCUTANEOUS EVERY 24 HOURS
Status: DISCONTINUED | OUTPATIENT
Start: 2022-10-23 | End: 2022-11-02 | Stop reason: HOSPADM

## 2022-10-22 RX ADMIN — ENOXAPARIN SODIUM 40 MG: 100 INJECTION SUBCUTANEOUS at 04:10

## 2022-10-22 RX ADMIN — METHOCARBAMOL 500 MG: 500 TABLET ORAL at 08:10

## 2022-10-22 RX ADMIN — TAMSULOSIN HYDROCHLORIDE 0.8 MG: 0.4 CAPSULE ORAL at 08:10

## 2022-10-22 RX ADMIN — AMLODIPINE BESYLATE 10 MG: 10 TABLET ORAL at 08:10

## 2022-10-22 RX ADMIN — GABAPENTIN 300 MG: 300 CAPSULE ORAL at 04:10

## 2022-10-22 RX ADMIN — GABAPENTIN 300 MG: 300 CAPSULE ORAL at 09:10

## 2022-10-22 RX ADMIN — ALVIMOPAN 12 MG: 12 CAPSULE ORAL at 08:10

## 2022-10-22 RX ADMIN — METHOCARBAMOL 500 MG: 500 TABLET ORAL at 09:10

## 2022-10-22 RX ADMIN — MUPIROCIN: 20 OINTMENT TOPICAL at 10:10

## 2022-10-22 RX ADMIN — GABAPENTIN 300 MG: 300 CAPSULE ORAL at 08:10

## 2022-10-22 RX ADMIN — ALVIMOPAN 12 MG: 12 CAPSULE ORAL at 09:10

## 2022-10-22 RX ADMIN — Medication: at 01:10

## 2022-10-22 RX ADMIN — METHOCARBAMOL 500 MG: 500 TABLET ORAL at 04:10

## 2022-10-22 RX ADMIN — OXYCODONE HYDROCHLORIDE 10 MG: 10 TABLET ORAL at 08:10

## 2022-10-22 RX ADMIN — OXYCODONE HYDROCHLORIDE 10 MG: 10 TABLET ORAL at 09:10

## 2022-10-22 NOTE — PLAN OF CARE
POC is reviewed and understood by patient. Pt c/o pain, but pt had to be encouraged to press pain pump button for pain control. Pt finally understood and his pain level improved. Voids adequately per urinal. Pt still has not had a BM. Pt encouraged to ambulate the halls to help with pain control. No c/o nausea. No sign of distress noted. Bed in lowest position. HOB elevated. WCTM.

## 2022-10-22 NOTE — ASSESSMENT & PLAN NOTE
Javier Martinez is a 72 y.o. M s/p extended right hemicolectomy 10/20    Plan:   - Advance to regular diet  - Oral narcotics and MM for pain control   - flomax this morning   - PRN nausea control

## 2022-10-22 NOTE — SUBJECTIVE & OBJECTIVE
Subjective:     Interval History: NAEO. VSS. Pain well controlled. Otherwise, doing well without nausea or vomiting. Tolerating CLD.    Post-Op Info:  Procedure(s) (LRB):  HEMICOLECTOMY, RIGHT, extended (N/A)  OMENTECTOMY (N/A)   2 Days Post-Op      Medications:  Continuous Infusions:      Scheduled Meds:   alvimopan  12 mg Oral BID    amLODIPine  10 mg Oral Daily    enoxaparin  40 mg Subcutaneous Daily    gabapentin  300 mg Oral TID    methocarbamoL  500 mg Oral TID    mupirocin   Nasal BID    tamsulosin  0.8 mg Oral Daily     PRN Meds:   HYDROmorphone    naloxone    ondansetron    oxyCODONE    oxyCODONE        Objective:     Vital Signs (Most Recent):  Temp: 97 °F (36.1 °C) (10/22/22 0748)  Pulse: 108 (10/22/22 0748)  Resp: 18 (10/22/22 0809)  BP: (!) 139/93 (10/22/22 0748)  SpO2: (!) 94 % (10/22/22 0748) Vital Signs (24h Range):  Temp:  [97 °F (36.1 °C)-98.5 °F (36.9 °C)] 97 °F (36.1 °C)  Pulse:  [] 108  Resp:  [7-24] 18  SpO2:  [94 %-98 %] 94 %  BP: (127-141)/(79-93) 139/93     Intake/Output - Last 3 Shifts         10/20 0700  10/21 0659 10/21 0700  10/22 0659 10/22 0700  10/23 0659    P.O. 200 0     I.V. (mL/kg) 2300 (48)      IV Piggyback 150      Total Intake(mL/kg) 2650 (55.3) 0 (0)     Urine (mL/kg/hr) 510 1200 (1)     Stool  0     Blood 200      Total Output 710 1200     Net +1940 -1200            Stool Occurrence  0 x             Physical Exam  HENT:      Mouth/Throat:      Mouth: Mucous membranes are moist.   Cardiovascular:      Rate and Rhythm: Normal rate.   Pulmonary:      Effort: Pulmonary effort is normal.   Abdominal:      General: There is distension.      Palpations: Abdomen is soft.      Tenderness: There is abdominal tenderness.   Neurological:      General: No focal deficit present.      Mental Status: He is alert.       Significant Labs:  BMP (Last 3 Results):   Recent Labs   Lab 10/21/22  0433 10/22/22  0338   * 136*   * 137   K 3.9 5.2*    106   CO2 25 24   BUN  4* <3*   CREATININE 0.7 0.6   CALCIUM 7.9* 8.5*   MG 1.7 1.7       CBC (Last 3 Results):   Recent Labs   Lab 10/21/22  0433 10/22/22  0338   WBC 15.41* 14.66*   RBC 3.64* 3.94*   HGB 10.4* 11.3*   HCT 32.8* 35.6*    475*   MCV 90 90   MCH 28.6 28.7   MCHC 31.7* 31.7*         Significant Diagnostics:  I have reviewed all pertinent imaging results/findings within the past 24 hours.

## 2022-10-22 NOTE — PROGRESS NOTES
[facility]  Colorectal Surgery  Progress Note    Patient Name: Javier Downs  MRN: [id number]  Admission Date: 10/20/2022  Hospital Length of Stay: 2 days  Attending Physician: EDILMA Bonilla MD    Subjective:     Interval History: NAEO. VSS. Pain well controlled. Otherwise, doing well without nausea or vomiting. Tolerating CLD.    Post-Op Info:  Procedure(s) (LRB):  HEMICOLECTOMY, RIGHT, extended (N/A)  OMENTECTOMY (N/A)   2 Days Post-Op      Medications:  Continuous Infusions:      Scheduled Meds:   alvimopan  12 mg Oral BID    amLODIPine  10 mg Oral Daily    enoxaparin  40 mg Subcutaneous Daily    gabapentin  300 mg Oral TID    methocarbamoL  500 mg Oral TID    mupirocin   Nasal BID    tamsulosin  0.8 mg Oral Daily     PRN Meds:   HYDROmorphone    naloxone    ondansetron    oxyCODONE    oxyCODONE        Objective:     Vital Signs (Most Recent):  Temp: 97 °F (36.1 °C) (10/22/22 0748)  Pulse: 108 (10/22/22 0748)  Resp: 18 (10/22/22 0809)  BP: (!) 139/93 (10/22/22 0748)  SpO2: (!) 94 % (10/22/22 0748) Vital Signs (24h Range):  Temp:  [97 °F (36.1 °C)-98.5 °F (36.9 °C)] 97 °F (36.1 °C)  Pulse:  [] 108  Resp:  [7-24] 18  SpO2:  [94 %-98 %] 94 %  BP: (127-141)/(79-93) 139/93     Intake/Output - Last 3 Shifts         10/20 0700  10/21 0659 10/21 0700  10/22 0659 10/22 0700  10/23 0659    P.O. 200 0     I.V. (mL/kg) 2300 (48)      IV Piggyback 150      Total Intake(mL/kg) 2650 (55.3) 0 (0)     Urine (mL/kg/hr) 510 1200 (1)     Stool  0     Blood 200      Total Output 710 1200     Net +1940 -1200            Stool Occurrence  0 x             Physical Exam  HENT:      Mouth/Throat:      Mouth: Mucous membranes are moist.   Cardiovascular:      Rate and Rhythm: Normal rate.   Pulmonary:      Effort: Pulmonary effort is normal.   Abdominal:      General: There is distension.      Palpations: Abdomen is soft.      Tenderness: There is abdominal tenderness.   Neurological:      General: No focal deficit  present.      Mental Status: He is alert.       Significant Labs:  BMP (Last 3 Results):   Recent Labs   Lab 10/21/22  0433 10/22/22  0338   * 136*   * 137   K 3.9 5.2*    106   CO2 25 24   BUN 4* <3*   CREATININE 0.7 0.6   CALCIUM 7.9* 8.5*   MG 1.7 1.7       CBC (Last 3 Results):   Recent Labs   Lab 10/21/22  0433 10/22/22  0338   WBC 15.41* 14.66*   RBC 3.64* 3.94*   HGB 10.4* 11.3*   HCT 32.8* 35.6*    475*   MCV 90 90   MCH 28.6 28.7   MCHC 31.7* 31.7*         Significant Diagnostics:  I have reviewed all pertinent imaging results/findings within the past 24 hours.    Assessment/Plan:     Colonic mass  Javier Martinez is a 72 y.o. M s/p extended right hemicolectomy 10/20    Plan:   - Advance to regular diet  - Oral narcotics and MM for pain control   - flomax this morning   - PRN nausea control           SUKUMAR GUTHRIE MD  Colorectal Surgery  [facility]

## 2022-10-22 NOTE — PLAN OF CARE
Pt AAO x 4, ambulating to bathroom with standby assist, room air, VSS, midline abdominal incision healing well. Fluids and PCA pump dc'd today. Pain controlled well with oral PRN medication.

## 2022-10-23 LAB
ALBUMIN SERPL BCP-MCNC: 2.1 G/DL (ref 3.5–5.2)
ALP SERPL-CCNC: 59 U/L (ref 55–135)
ALT SERPL W/O P-5'-P-CCNC: 11 U/L (ref 10–44)
ANION GAP SERPL CALC-SCNC: 5 MMOL/L (ref 8–16)
AST SERPL-CCNC: 29 U/L (ref 10–40)
BASOPHILS # BLD AUTO: 0.02 K/UL (ref 0–0.2)
BASOPHILS NFR BLD: 0.2 % (ref 0–1.9)
BILIRUB SERPL-MCNC: 0.5 MG/DL (ref 0.1–1)
BUN SERPL-MCNC: 4 MG/DL (ref 8–23)
CALCIUM SERPL-MCNC: 8.7 MG/DL (ref 8.7–10.5)
CHLORIDE SERPL-SCNC: 101 MMOL/L (ref 95–110)
CO2 SERPL-SCNC: 29 MMOL/L (ref 23–29)
CREAT SERPL-MCNC: 0.7 MG/DL (ref 0.5–1.4)
CRP SERPL-MCNC: 129.8 MG/L (ref 0–8.2)
DIFFERENTIAL METHOD: ABNORMAL
EOSINOPHIL # BLD AUTO: 0 K/UL (ref 0–0.5)
EOSINOPHIL NFR BLD: 0.2 % (ref 0–8)
ERYTHROCYTE [DISTWIDTH] IN BLOOD BY AUTOMATED COUNT: 15.1 % (ref 11.5–14.5)
EST. GFR  (NO RACE VARIABLE): >60 ML/MIN/1.73 M^2
GLUCOSE SERPL-MCNC: 89 MG/DL (ref 70–110)
HCT VFR BLD AUTO: 33.6 % (ref 40–54)
HGB BLD-MCNC: 10.5 G/DL (ref 14–18)
IMM GRANULOCYTES # BLD AUTO: 0.06 K/UL (ref 0–0.04)
IMM GRANULOCYTES NFR BLD AUTO: 0.5 % (ref 0–0.5)
LIPASE SERPL-CCNC: 14 U/L (ref 4–60)
LYMPHOCYTES # BLD AUTO: 1.4 K/UL (ref 1–4.8)
LYMPHOCYTES NFR BLD: 12.5 % (ref 18–48)
MAGNESIUM SERPL-MCNC: 1.6 MG/DL (ref 1.6–2.6)
MCH RBC QN AUTO: 28.8 PG (ref 27–31)
MCHC RBC AUTO-ENTMCNC: 31.3 G/DL (ref 32–36)
MCV RBC AUTO: 92 FL (ref 82–98)
MONOCYTES # BLD AUTO: 1.3 K/UL (ref 0.3–1)
MONOCYTES NFR BLD: 11.3 % (ref 4–15)
NEUTROPHILS # BLD AUTO: 8.7 K/UL (ref 1.8–7.7)
NEUTROPHILS NFR BLD: 75.3 % (ref 38–73)
NRBC BLD-RTO: 0 /100 WBC
PHOSPHATE SERPL-MCNC: 2.3 MG/DL (ref 2.7–4.5)
PLATELET # BLD AUTO: 439 K/UL (ref 150–450)
PMV BLD AUTO: 9.5 FL (ref 9.2–12.9)
POTASSIUM SERPL-SCNC: 4.8 MMOL/L (ref 3.5–5.1)
PROT SERPL-MCNC: 5.6 G/DL (ref 6–8.4)
RBC # BLD AUTO: 3.65 M/UL (ref 4.6–6.2)
SODIUM SERPL-SCNC: 135 MMOL/L (ref 136–145)
WBC # BLD AUTO: 11.49 K/UL (ref 3.9–12.7)

## 2022-10-23 PROCEDURE — 80053 COMPREHEN METABOLIC PANEL: CPT | Performed by: STUDENT IN AN ORGANIZED HEALTH CARE EDUCATION/TRAINING PROGRAM

## 2022-10-23 PROCEDURE — 25000003 PHARM REV CODE 250: Performed by: STUDENT IN AN ORGANIZED HEALTH CARE EDUCATION/TRAINING PROGRAM

## 2022-10-23 PROCEDURE — 63600175 PHARM REV CODE 636 W HCPCS

## 2022-10-23 PROCEDURE — 25000003 PHARM REV CODE 250

## 2022-10-23 PROCEDURE — 84100 ASSAY OF PHOSPHORUS: CPT | Performed by: STUDENT IN AN ORGANIZED HEALTH CARE EDUCATION/TRAINING PROGRAM

## 2022-10-23 PROCEDURE — 86140 C-REACTIVE PROTEIN: CPT | Performed by: STUDENT IN AN ORGANIZED HEALTH CARE EDUCATION/TRAINING PROGRAM

## 2022-10-23 PROCEDURE — 85025 COMPLETE CBC W/AUTO DIFF WBC: CPT | Performed by: STUDENT IN AN ORGANIZED HEALTH CARE EDUCATION/TRAINING PROGRAM

## 2022-10-23 PROCEDURE — 36415 COLL VENOUS BLD VENIPUNCTURE: CPT | Performed by: STUDENT IN AN ORGANIZED HEALTH CARE EDUCATION/TRAINING PROGRAM

## 2022-10-23 PROCEDURE — 83690 ASSAY OF LIPASE: CPT | Performed by: STUDENT IN AN ORGANIZED HEALTH CARE EDUCATION/TRAINING PROGRAM

## 2022-10-23 PROCEDURE — 20600001 HC STEP DOWN PRIVATE ROOM

## 2022-10-23 PROCEDURE — 25000003 PHARM REV CODE 250: Performed by: NURSE PRACTITIONER

## 2022-10-23 PROCEDURE — 63600175 PHARM REV CODE 636 W HCPCS: Performed by: STUDENT IN AN ORGANIZED HEALTH CARE EDUCATION/TRAINING PROGRAM

## 2022-10-23 PROCEDURE — 83735 ASSAY OF MAGNESIUM: CPT | Performed by: STUDENT IN AN ORGANIZED HEALTH CARE EDUCATION/TRAINING PROGRAM

## 2022-10-23 RX ORDER — DEXTROSE MONOHYDRATE, SODIUM CHLORIDE, AND POTASSIUM CHLORIDE 50; 1.49; 4.5 G/1000ML; G/1000ML; G/1000ML
INJECTION, SOLUTION INTRAVENOUS CONTINUOUS
Status: DISCONTINUED | OUTPATIENT
Start: 2022-10-23 | End: 2022-10-30

## 2022-10-23 RX ADMIN — DEXTROSE, SODIUM CHLORIDE, AND POTASSIUM CHLORIDE: 5; .45; .15 INJECTION INTRAVENOUS at 12:10

## 2022-10-23 RX ADMIN — ALVIMOPAN 12 MG: 12 CAPSULE ORAL at 09:10

## 2022-10-23 RX ADMIN — TAMSULOSIN HYDROCHLORIDE 0.8 MG: 0.4 CAPSULE ORAL at 08:10

## 2022-10-23 RX ADMIN — AMLODIPINE BESYLATE 10 MG: 10 TABLET ORAL at 08:10

## 2022-10-23 RX ADMIN — OXYCODONE 5 MG: 5 TABLET ORAL at 05:10

## 2022-10-23 RX ADMIN — METHOCARBAMOL 500 MG: 500 TABLET ORAL at 09:10

## 2022-10-23 RX ADMIN — DEXTROSE, SODIUM CHLORIDE, AND POTASSIUM CHLORIDE: 5; .45; .15 INJECTION INTRAVENOUS at 09:10

## 2022-10-23 RX ADMIN — MUPIROCIN: 20 OINTMENT TOPICAL at 08:10

## 2022-10-23 RX ADMIN — MUPIROCIN: 20 OINTMENT TOPICAL at 09:10

## 2022-10-23 RX ADMIN — OXYCODONE HYDROCHLORIDE 10 MG: 10 TABLET ORAL at 04:10

## 2022-10-23 RX ADMIN — METHOCARBAMOL 500 MG: 500 TABLET ORAL at 08:10

## 2022-10-23 RX ADMIN — HYDROMORPHONE HYDROCHLORIDE 0.2 MG: 1 INJECTION, SOLUTION INTRAMUSCULAR; INTRAVENOUS; SUBCUTANEOUS at 10:10

## 2022-10-23 RX ADMIN — ENOXAPARIN SODIUM 30 MG: 30 INJECTION SUBCUTANEOUS at 04:10

## 2022-10-23 RX ADMIN — ALVIMOPAN 12 MG: 12 CAPSULE ORAL at 08:10

## 2022-10-23 RX ADMIN — GABAPENTIN 300 MG: 300 CAPSULE ORAL at 09:10

## 2022-10-23 RX ADMIN — GABAPENTIN 300 MG: 300 CAPSULE ORAL at 08:10

## 2022-10-23 RX ADMIN — SODIUM CHLORIDE, SODIUM LACTATE, POTASSIUM CHLORIDE, AND CALCIUM CHLORIDE 1000 ML: .6; .31; .03; .02 INJECTION, SOLUTION INTRAVENOUS at 07:10

## 2022-10-23 NOTE — ASSESSMENT & PLAN NOTE
Javier Martinez is a 72 y.o. M s/p extended right hemicolectomy 10/20    Plan:   - NPO  - IVF with house fluids   - Oral narcotics and MM for pain control   - flomax this morning   - PRN nausea control  - continue to closely monitor bowel function   - morning labs pending - correct electrolytes as needed

## 2022-10-23 NOTE — SUBJECTIVE & OBJECTIVE
Subjective:     Interval History: NAEO. VSS. Patient reports small volume bilious emesis and feel unsteady on while standing. Patient having small amounts of gas with no bowel movements. Will make patient NPO, until patient has improved return of bowel function.     Post-Op Info:  Procedure(s) (LRB):  HEMICOLECTOMY, RIGHT, extended (N/A)  OMENTECTOMY (N/A)   3 Days Post-Op      Medications:  Continuous Infusions:   dextrose 5 % and 0.45 % NaCl with KCl 20 mEq       Scheduled Meds:   alvimopan  12 mg Oral BID    amLODIPine  10 mg Oral Daily    enoxaparin  30 mg Subcutaneous Daily    gabapentin  300 mg Oral TID    methocarbamoL  500 mg Oral TID    mupirocin   Nasal BID    tamsulosin  0.8 mg Oral Daily     PRN Meds:   HYDROmorphone    naloxone    ondansetron    oxyCODONE    oxyCODONE        Objective:     Vital Signs (Most Recent):  Temp: 98.5 °F (36.9 °C) (10/23/22 0708)  Pulse: 110 (10/23/22 0708)  Resp: 16 (10/23/22 0708)  BP: 118/83 (10/23/22 0708)  SpO2: 96 % (10/23/22 0708) Vital Signs (24h Range):  Temp:  [96.7 °F (35.9 °C)-98.5 °F (36.9 °C)] 98.5 °F (36.9 °C)  Pulse:  [105-111] 110  Resp:  [14-18] 16  SpO2:  [95 %-98 %] 96 %  BP: (111-134)/(73-87) 118/83     Intake/Output - Last 3 Shifts         10/21 0700  10/22 0659 10/22 0700  10/23 0659 10/23 0700  10/24 0659    P.O. 0 600     I.V. (mL/kg)       IV Piggyback       Total Intake(mL/kg) 0 (0) 600 (12.5)     Urine (mL/kg/hr) 1200 (1) 650 (0.6)     Stool 0      Blood       Total Output 1200 650     Net -1200 -50            Stool Occurrence 0 x              Physical Exam  HENT:      Mouth/Throat:      Mouth: Mucous membranes are moist.   Eyes:      Pupils: Pupils are equal, round, and reactive to light.   Cardiovascular:      Rate and Rhythm: Normal rate.   Pulmonary:      Effort: Pulmonary effort is normal.   Abdominal:      General: There is no distension.      Palpations: Abdomen is soft.      Tenderness: There is abdominal tenderness. There is no guarding.    Neurological:      General: No focal deficit present.      Mental Status: He is alert.       Significant Labs:  BMP (Last 3 Results):   Recent Labs   Lab 10/21/22  0433 10/22/22  0338 10/23/22  0638   * 136* 89   * 137 135*   K 3.9 5.2* 4.8    106 101   CO2 25 24 29   BUN 4* <3* 4*   CREATININE 0.7 0.6 0.7   CALCIUM 7.9* 8.5* 8.7   MG 1.7 1.7 1.6     CBC (Last 3 Results):   Recent Labs   Lab 10/21/22  0433 10/22/22  0338   WBC 15.41* 14.66*   RBC 3.64* 3.94*   HGB 10.4* 11.3*   HCT 32.8* 35.6*    475*   MCV 90 90   MCH 28.6 28.7   MCHC 31.7* 31.7*       Significant Diagnostics:  I have reviewed all pertinent imaging results/findings within the past 24 hours.

## 2022-10-23 NOTE — PROGRESS NOTES
[facility]  Colorectal Surgery  Progress Note    Patient Name: Javier Downs  MRN: [id number]  Admission Date: 10/20/2022  Hospital Length of Stay: 3 days  Attending Physician: DEILMA Bonilla MD    Subjective:     Interval History: NAEO. VSS. Patient reports small volume bilious emesis and feels unsteady while standing. Patient having small amounts of gas with no bowel movements. Will make patient NPO, until patient has improved return of bowel function.     Post-Op Info:  Procedure(s) (LRB):  HEMICOLECTOMY, RIGHT, extended (N/A)  OMENTECTOMY (N/A)   3 Days Post-Op      Medications:  Continuous Infusions:   dextrose 5 % and 0.45 % NaCl with KCl 20 mEq       Scheduled Meds:   alvimopan  12 mg Oral BID    amLODIPine  10 mg Oral Daily    enoxaparin  30 mg Subcutaneous Daily    gabapentin  300 mg Oral TID    methocarbamoL  500 mg Oral TID    mupirocin   Nasal BID    tamsulosin  0.8 mg Oral Daily     PRN Meds:   HYDROmorphone    naloxone    ondansetron    oxyCODONE    oxyCODONE        Objective:     Vital Signs (Most Recent):  Temp: 98.5 °F (36.9 °C) (10/23/22 0708)  Pulse: 110 (10/23/22 0708)  Resp: 16 (10/23/22 0708)  BP: 118/83 (10/23/22 0708)  SpO2: 96 % (10/23/22 0708) Vital Signs (24h Range):  Temp:  [96.7 °F (35.9 °C)-98.5 °F (36.9 °C)] 98.5 °F (36.9 °C)  Pulse:  [105-111] 110  Resp:  [14-18] 16  SpO2:  [95 %-98 %] 96 %  BP: (111-134)/(73-87) 118/83     Intake/Output - Last 3 Shifts         10/21 0700  10/22 0659 10/22 0700  10/23 0659 10/23 0700  10/24 0659    P.O. 0 600     I.V. (mL/kg)       IV Piggyback       Total Intake(mL/kg) 0 (0) 600 (12.5)     Urine (mL/kg/hr) 1200 (1) 650 (0.6)     Stool 0      Blood       Total Output 1200 650     Net -1200 -50            Stool Occurrence 0 x              Physical Exam  HENT:      Mouth/Throat:      Mouth: Mucous membranes are moist.   Eyes:      Pupils: Pupils are equal, round, and reactive to light.   Cardiovascular:      Rate and Rhythm: Normal  rate.   Pulmonary:      Effort: Pulmonary effort is normal.   Abdominal:      General: There is no distension.      Palpations: Abdomen is soft.      Tenderness: There is abdominal tenderness. There is no guarding.   Neurological:      General: No focal deficit present.      Mental Status: He is alert.       Significant Labs:  BMP (Last 3 Results):   Recent Labs   Lab 10/21/22  0433 10/22/22  0338 10/23/22  0638   * 136* 89   * 137 135*   K 3.9 5.2* 4.8    106 101   CO2 25 24 29   BUN 4* <3* 4*   CREATININE 0.7 0.6 0.7   CALCIUM 7.9* 8.5* 8.7   MG 1.7 1.7 1.6     CBC (Last 3 Results):   Recent Labs   Lab 10/21/22  0433 10/22/22  0338   WBC 15.41* 14.66*   RBC 3.64* 3.94*   HGB 10.4* 11.3*   HCT 32.8* 35.6*    475*   MCV 90 90   MCH 28.6 28.7   MCHC 31.7* 31.7*       Significant Diagnostics:  I have reviewed all pertinent imaging results/findings within the past 24 hours.    Assessment/Plan:     Colonic mass  Javier Martinez is a 72 y.o. M s/p extended right hemicolectomy 10/20    Plan:   - NPO  - IVF with house fluids   - Oral narcotics and MM for pain control   - flomax this morning   - PRN nausea control  - continue to closely monitor bowel function   - morning labs pending - correct electrolytes as needed            Blanka Horvath MD  Colorectal Surgery  [facility]

## 2022-10-23 NOTE — PLAN OF CARE
AAO x4, VSS, ambulates to bathroom, incision site healing well. Reports small emesis this morning and feels unsteady when standing. Pt made NPO and fluids restarted by primary team. NG tube inserted and placed to low intermittent suction. Discussed plan of care with pt and wife.

## 2022-10-24 LAB
ALBUMIN SERPL BCP-MCNC: 2 G/DL (ref 3.5–5.2)
ALP SERPL-CCNC: 55 U/L (ref 55–135)
ALT SERPL W/O P-5'-P-CCNC: 11 U/L (ref 10–44)
ANION GAP SERPL CALC-SCNC: 9 MMOL/L (ref 8–16)
AST SERPL-CCNC: 31 U/L (ref 10–40)
BASOPHILS # BLD AUTO: 0.02 K/UL (ref 0–0.2)
BASOPHILS NFR BLD: 0.2 % (ref 0–1.9)
BILIRUB SERPL-MCNC: 0.5 MG/DL (ref 0.1–1)
BUN SERPL-MCNC: 5 MG/DL (ref 8–23)
CALCIUM SERPL-MCNC: 8.4 MG/DL (ref 8.7–10.5)
CHLORIDE SERPL-SCNC: 103 MMOL/L (ref 95–110)
CO2 SERPL-SCNC: 22 MMOL/L (ref 23–29)
CREAT SERPL-MCNC: 0.6 MG/DL (ref 0.5–1.4)
CRP SERPL-MCNC: 132.2 MG/L (ref 0–8.2)
DIFFERENTIAL METHOD: ABNORMAL
EOSINOPHIL # BLD AUTO: 0 K/UL (ref 0–0.5)
EOSINOPHIL NFR BLD: 0.1 % (ref 0–8)
ERYTHROCYTE [DISTWIDTH] IN BLOOD BY AUTOMATED COUNT: 15.2 % (ref 11.5–14.5)
EST. GFR  (NO RACE VARIABLE): >60 ML/MIN/1.73 M^2
GLUCOSE SERPL-MCNC: 121 MG/DL (ref 70–110)
HCT VFR BLD AUTO: 31.6 % (ref 40–54)
HGB BLD-MCNC: 10 G/DL (ref 14–18)
IMM GRANULOCYTES # BLD AUTO: 0.04 K/UL (ref 0–0.04)
IMM GRANULOCYTES NFR BLD AUTO: 0.3 % (ref 0–0.5)
LYMPHOCYTES # BLD AUTO: 1.5 K/UL (ref 1–4.8)
LYMPHOCYTES NFR BLD: 12.2 % (ref 18–48)
MAGNESIUM SERPL-MCNC: 1.6 MG/DL (ref 1.6–2.6)
MCH RBC QN AUTO: 28.7 PG (ref 27–31)
MCHC RBC AUTO-ENTMCNC: 31.6 G/DL (ref 32–36)
MCV RBC AUTO: 91 FL (ref 82–98)
MONOCYTES # BLD AUTO: 1.4 K/UL (ref 0.3–1)
MONOCYTES NFR BLD: 11.1 % (ref 4–15)
NEUTROPHILS # BLD AUTO: 9.4 K/UL (ref 1.8–7.7)
NEUTROPHILS NFR BLD: 76.1 % (ref 38–73)
NRBC BLD-RTO: 0 /100 WBC
PHOSPHATE SERPL-MCNC: 2.7 MG/DL (ref 2.7–4.5)
PLATELET # BLD AUTO: 397 K/UL (ref 150–450)
PMV BLD AUTO: 9.3 FL (ref 9.2–12.9)
POTASSIUM SERPL-SCNC: 4.3 MMOL/L (ref 3.5–5.1)
PROT SERPL-MCNC: 5.4 G/DL (ref 6–8.4)
RBC # BLD AUTO: 3.49 M/UL (ref 4.6–6.2)
SODIUM SERPL-SCNC: 134 MMOL/L (ref 136–145)
WBC # BLD AUTO: 12.3 K/UL (ref 3.9–12.7)

## 2022-10-24 PROCEDURE — 85025 COMPLETE CBC W/AUTO DIFF WBC: CPT | Performed by: STUDENT IN AN ORGANIZED HEALTH CARE EDUCATION/TRAINING PROGRAM

## 2022-10-24 PROCEDURE — 80053 COMPREHEN METABOLIC PANEL: CPT | Performed by: STUDENT IN AN ORGANIZED HEALTH CARE EDUCATION/TRAINING PROGRAM

## 2022-10-24 PROCEDURE — 84100 ASSAY OF PHOSPHORUS: CPT | Performed by: STUDENT IN AN ORGANIZED HEALTH CARE EDUCATION/TRAINING PROGRAM

## 2022-10-24 PROCEDURE — 25000003 PHARM REV CODE 250: Performed by: STUDENT IN AN ORGANIZED HEALTH CARE EDUCATION/TRAINING PROGRAM

## 2022-10-24 PROCEDURE — 20600001 HC STEP DOWN PRIVATE ROOM

## 2022-10-24 PROCEDURE — 83735 ASSAY OF MAGNESIUM: CPT | Performed by: STUDENT IN AN ORGANIZED HEALTH CARE EDUCATION/TRAINING PROGRAM

## 2022-10-24 PROCEDURE — 63600175 PHARM REV CODE 636 W HCPCS

## 2022-10-24 PROCEDURE — 36415 COLL VENOUS BLD VENIPUNCTURE: CPT | Performed by: STUDENT IN AN ORGANIZED HEALTH CARE EDUCATION/TRAINING PROGRAM

## 2022-10-24 PROCEDURE — 25000003 PHARM REV CODE 250

## 2022-10-24 PROCEDURE — 25000003 PHARM REV CODE 250: Performed by: NURSE PRACTITIONER

## 2022-10-24 PROCEDURE — 86140 C-REACTIVE PROTEIN: CPT | Performed by: STUDENT IN AN ORGANIZED HEALTH CARE EDUCATION/TRAINING PROGRAM

## 2022-10-24 PROCEDURE — 63600175 PHARM REV CODE 636 W HCPCS: Performed by: STUDENT IN AN ORGANIZED HEALTH CARE EDUCATION/TRAINING PROGRAM

## 2022-10-24 RX ADMIN — AMLODIPINE BESYLATE 10 MG: 10 TABLET ORAL at 08:10

## 2022-10-24 RX ADMIN — TAMSULOSIN HYDROCHLORIDE 0.8 MG: 0.4 CAPSULE ORAL at 08:10

## 2022-10-24 RX ADMIN — MUPIROCIN: 20 OINTMENT TOPICAL at 08:10

## 2022-10-24 RX ADMIN — ALVIMOPAN 12 MG: 12 CAPSULE ORAL at 09:10

## 2022-10-24 RX ADMIN — DEXTROSE, SODIUM CHLORIDE, AND POTASSIUM CHLORIDE: 5; .45; .15 INJECTION INTRAVENOUS at 09:10

## 2022-10-24 RX ADMIN — DEXTROSE, SODIUM CHLORIDE, AND POTASSIUM CHLORIDE: 5; .45; .15 INJECTION INTRAVENOUS at 11:10

## 2022-10-24 RX ADMIN — METHOCARBAMOL 500 MG: 500 TABLET ORAL at 09:10

## 2022-10-24 RX ADMIN — METHOCARBAMOL 500 MG: 500 TABLET ORAL at 08:10

## 2022-10-24 RX ADMIN — ENOXAPARIN SODIUM 30 MG: 30 INJECTION SUBCUTANEOUS at 04:10

## 2022-10-24 RX ADMIN — GABAPENTIN 300 MG: 300 CAPSULE ORAL at 09:10

## 2022-10-24 RX ADMIN — SODIUM CHLORIDE, SODIUM LACTATE, POTASSIUM CHLORIDE, AND CALCIUM CHLORIDE 500 ML: .6; .31; .03; .02 INJECTION, SOLUTION INTRAVENOUS at 04:10

## 2022-10-24 RX ADMIN — GABAPENTIN 300 MG: 300 CAPSULE ORAL at 08:10

## 2022-10-24 RX ADMIN — METHOCARBAMOL 500 MG: 500 TABLET ORAL at 02:10

## 2022-10-24 RX ADMIN — OXYCODONE 5 MG: 5 TABLET ORAL at 09:10

## 2022-10-24 RX ADMIN — GABAPENTIN 300 MG: 300 CAPSULE ORAL at 02:10

## 2022-10-24 NOTE — SUBJECTIVE & OBJECTIVE
Subjective:     Interval History: NAEO. VSS. Patient reports improvement in nausea and no vomiting overnight. Pain is well controlled with oral pain meds. Right sided tenderness to palpation this morning, change from previous exam.  NGT with significant bilious output.     Post-Op Info:  Procedure(s) (LRB):  HEMICOLECTOMY, RIGHT, extended (N/A)  OMENTECTOMY (N/A)   4 Days Post-Op      Medications:  Continuous Infusions:   dextrose 5 % and 0.45 % NaCl with KCl 20 mEq 100 mL/hr at 10/23/22 2146     Scheduled Meds:   alvimopan  12 mg Oral BID    amLODIPine  10 mg Oral Daily    enoxaparin  30 mg Subcutaneous Daily    gabapentin  300 mg Oral TID    methocarbamoL  500 mg Oral TID    mupirocin   Nasal BID    tamsulosin  0.8 mg Oral Daily     PRN Meds:   HYDROmorphone    naloxone    ondansetron    oxyCODONE    oxyCODONE        Objective:     Vital Signs (Most Recent):  Temp: 98.2 °F (36.8 °C) (10/24/22 0501)  Pulse: 99 (10/24/22 0501)  Resp: 20 (10/23/22 2208)  BP: 135/89 (10/24/22 0501)  SpO2: 95 % (10/24/22 0501) Vital Signs (24h Range):  Temp:  [98.2 °F (36.8 °C)-99.6 °F (37.6 °C)] 98.2 °F (36.8 °C)  Pulse:  [] 99  Resp:  [16-20] 20  SpO2:  [93 %-96 %] 95 %  BP: (118-145)/() 135/89     Intake/Output - Last 3 Shifts         10/22 0700  10/23 0659 10/23 0700  10/24 0659    P.O. 600 0    I.V. (mL/kg)  700 (14.6)    Total Intake(mL/kg) 600 (12.5) 700 (14.6)    Urine (mL/kg/hr) 650 (0.6) 650 (0.6)    Drains  600    Total Output 650 1250    Net -50 -550          Urine Occurrence  1 x            Physical Exam  HENT:      Nose:      Comments: NGT in place     Mouth/Throat:      Mouth: Mucous membranes are moist.   Eyes:      Pupils: Pupils are equal, round, and reactive to light.   Cardiovascular:      Rate and Rhythm: Normal rate.   Pulmonary:      Effort: Pulmonary effort is normal.   Abdominal:      General: There is no distension.      Palpations: Abdomen is soft.      Tenderness: There is abdominal tenderness.  There is guarding.   Neurological:      General: No focal deficit present.      Mental Status: He is alert.         Significant Labs:  BMP (Last 3 Results):   Recent Labs   Lab 10/22/22  0338 10/23/22  0638 10/24/22  0257   * 89 121*    135* 134*   K 5.2* 4.8 4.3    101 103   CO2 24 29 22*   BUN <3* 4* 5*   CREATININE 0.6 0.7 0.6   CALCIUM 8.5* 8.7 8.4*   MG 1.7 1.6 1.6     CBC (Last 3 Results):   Recent Labs   Lab 10/22/22  0338 10/23/22  0638 10/24/22  0257   WBC 14.66* 11.49 12.30   RBC 3.94* 3.65* 3.49*   HGB 11.3* 10.5* 10.0*   HCT 35.6* 33.6* 31.6*   * 439 397   MCV 90 92 91   MCH 28.7 28.8 28.7   MCHC 31.7* 31.3* 31.6*       Significant Diagnostics:  I have reviewed all pertinent imaging results/findings within the past 24 hours.

## 2022-10-24 NOTE — SUBJECTIVE & OBJECTIVE
Subjective:     Interval History:  NAEO. VSS. Patient reports improvement in nausea and no vomiting overnight. Pain is well controlled with oral pain meds. Right sided tenderness to palpation this morning, change from previous exam.  NGT with large volume bilious output.     Post-Op Info:  Procedure(s) (LRB):  HEMICOLECTOMY, RIGHT, extended (N/A)  OMENTECTOMY (N/A)   4 Days Post-Op      Medications:  Continuous Infusions:   dextrose 5 % and 0.45 % NaCl with KCl 20 mEq 100 mL/hr at 10/23/22 2146     Scheduled Meds:   alvimopan  12 mg Oral BID    amLODIPine  10 mg Oral Daily    enoxaparin  30 mg Subcutaneous Daily    gabapentin  300 mg Oral TID    methocarbamoL  500 mg Oral TID    mupirocin   Nasal BID    tamsulosin  0.8 mg Oral Daily     PRN Meds:   HYDROmorphone    naloxone    ondansetron    oxyCODONE    oxyCODONE        Objective:     Vital Signs (Most Recent):  Temp: 98.2 °F (36.8 °C) (10/24/22 0501)  Pulse: 99 (10/24/22 0501)  Resp: 20 (10/23/22 2208)  BP: 135/89 (10/24/22 0501)  SpO2: 95 % (10/24/22 0501) Vital Signs (24h Range):  Temp:  [98.2 °F (36.8 °C)-99.6 °F (37.6 °C)] 98.2 °F (36.8 °C)  Pulse:  [] 99  Resp:  [16-20] 20  SpO2:  [93 %-96 %] 95 %  BP: (118-145)/() 135/89     Intake/Output - Last 3 Shifts         10/22 0700  10/23 0659 10/23 0700  10/24 0659    P.O. 600 0    I.V. (mL/kg)  700 (14.6)    Total Intake(mL/kg) 600 (12.5) 700 (14.6)    Urine (mL/kg/hr) 650 (0.6) 650 (0.6)    Drains  600    Total Output 650 1250    Net -50 -550          Urine Occurrence  1 x            Physical Exam  HENT:      Nose:      Comments: NGT in place     Mouth/Throat:      Mouth: Mucous membranes are moist.   Eyes:      Pupils: Pupils are equal, round, and reactive to light.   Cardiovascular:      Rate and Rhythm: Normal rate.   Pulmonary:      Effort: Pulmonary effort is normal.   Abdominal:      General: There is no distension.      Palpations: Abdomen is soft.      Tenderness: There is abdominal  tenderness. There is guarding.   Neurological:      General: No focal deficit present.      Mental Status: He is alert.       Significant Labs:  BMP (Last 3 Results):   Recent Labs   Lab 10/22/22  0338 10/23/22  0638 10/24/22  0257   * 89 121*    135* 134*   K 5.2* 4.8 4.3    101 103   CO2 24 29 22*   BUN <3* 4* 5*   CREATININE 0.6 0.7 0.6   CALCIUM 8.5* 8.7 8.4*   MG 1.7 1.6 1.6     CBC (Last 3 Results):   Recent Labs   Lab 10/22/22  0338 10/23/22  0638 10/24/22  0257   WBC 14.66* 11.49 12.30   RBC 3.94* 3.65* 3.49*   HGB 11.3* 10.5* 10.0*   HCT 35.6* 33.6* 31.6*   * 439 397   MCV 90 92 91   MCH 28.7 28.8 28.7   MCHC 31.7* 31.3* 31.6*       Significant Diagnostics:  I have reviewed all pertinent imaging results/findings within the past 24 hours.

## 2022-10-24 NOTE — ASSESSMENT & PLAN NOTE
Javier Martinez is a 72 y.o. M s/p extended right hemicolectomy 10/20    Plan:   - NPO  - IVF with house fluids   - Oral narcotics and MM for pain control   - flomax    - PRN nausea control  - NGT in place with 75cc output   - KUB 10/24/2022 with improve bowel dilation    - will review with staff if dc NGT today   - have bowel movements

## 2022-10-24 NOTE — PROGRESS NOTES
[facility]  Colorectal Surgery  Progress Note    Patient Name: Javier Downs  MRN: [id number]  Admission Date: 10/20/2022  Hospital Length of Stay: 4 days  Attending Physician: EDILMA Bonilla MD    Subjective:     Interval History: NAEO. VSS. Patient reports improvement in nausea and no vomiting overnight. Pain is well controlled with oral pain meds. Right sided tenderness to palpation this morning, change from previous exam.  NGT with large volume bilious output.     Post-Op Info:  Procedure(s) (LRB):  HEMICOLECTOMY, RIGHT, extended (N/A)  OMENTECTOMY (N/A)   4 Days Post-Op      Medications:  Continuous Infusions:   dextrose 5 % and 0.45 % NaCl with KCl 20 mEq 100 mL/hr at 10/23/22 2146     Scheduled Meds:   alvimopan  12 mg Oral BID    amLODIPine  10 mg Oral Daily    enoxaparin  30 mg Subcutaneous Daily    gabapentin  300 mg Oral TID    methocarbamoL  500 mg Oral TID    mupirocin   Nasal BID    tamsulosin  0.8 mg Oral Daily     PRN Meds:   HYDROmorphone    naloxone    ondansetron    oxyCODONE    oxyCODONE        Objective:     Vital Signs (Most Recent):  Temp: 98.2 °F (36.8 °C) (10/24/22 0501)  Pulse: 99 (10/24/22 0501)  Resp: 20 (10/23/22 2208)  BP: 135/89 (10/24/22 0501)  SpO2: 95 % (10/24/22 0501) Vital Signs (24h Range):  Temp:  [98.2 °F (36.8 °C)-99.6 °F (37.6 °C)] 98.2 °F (36.8 °C)  Pulse:  [] 99  Resp:  [16-20] 20  SpO2:  [93 %-96 %] 95 %  BP: (118-145)/() 135/89     Intake/Output - Last 3 Shifts         10/22 0700  10/23 0659 10/23 0700  10/24 0659    P.O. 600 0    I.V. (mL/kg)  700 (14.6)    Total Intake(mL/kg) 600 (12.5) 700 (14.6)    Urine (mL/kg/hr) 650 (0.6) 650 (0.6)    Drains  600    Total Output 650 1250    Net -50 -550          Urine Occurrence  1 x            Physical Exam  HENT:      Nose:      Comments: NGT in place     Mouth/Throat:      Mouth: Mucous membranes are moist.   Eyes:      Pupils: Pupils are equal, round, and reactive to light.   Cardiovascular:       Rate and Rhythm: Normal rate.   Pulmonary:      Effort: Pulmonary effort is normal.   Abdominal:      General: There is no distension.      Palpations: Abdomen is soft.      Tenderness: There is abdominal tenderness. There is guarding.   Neurological:      General: No focal deficit present.      Mental Status: He is alert.         Significant Labs:  BMP (Last 3 Results):   Recent Labs   Lab 10/22/22  0338 10/23/22  0638 10/24/22  0257   * 89 121*    135* 134*   K 5.2* 4.8 4.3    101 103   CO2 24 29 22*   BUN <3* 4* 5*   CREATININE 0.6 0.7 0.6   CALCIUM 8.5* 8.7 8.4*   MG 1.7 1.6 1.6     CBC (Last 3 Results):   Recent Labs   Lab 10/22/22  0338 10/23/22  0638 10/24/22  0257   WBC 14.66* 11.49 12.30   RBC 3.94* 3.65* 3.49*   HGB 11.3* 10.5* 10.0*   HCT 35.6* 33.6* 31.6*   * 439 397   MCV 90 92 91   MCH 28.7 28.8 28.7   MCHC 31.7* 31.3* 31.6*       Significant Diagnostics:  I have reviewed all pertinent imaging results/findings within the past 24 hours.    Assessment/Plan:     Colonic mass  Javier Martinez is a 72 y.o. M s/p extended right hemicolectomy 10/20    Plan:   - NPO  - IVF with house fluids   - Oral narcotics and MM for pain control   - flomax this morning   - PRN nausea control  - NGT in place with 600 bilious out put   - KUB 10/23/2022 with dilated small bowel loops   - Change in abdominal exam this morning in the setting of ileus/obstruction possible CT scan today vs tomorrow  - continue to closely monitor bowel function   - morning labs pending - correct electrolytes as needed          Blanka Horvath MD  Colorectal Surgery  [facility]

## 2022-10-24 NOTE — PROGRESS NOTES
[facility]  Colorectal Surgery  Progress Note    Patient Name: Javier Downs  MRN: [id number]  Admission Date: 10/20/2022  Hospital Length of Stay: 4 days  Attending Physician: EDILMA Bonilla MD    Subjective:     Interval History:  NAEO. VSS. Patient reports improvement in nausea and no vomiting overnight. Pain is well controlled with oral pain meds. Right sided tenderness to palpation this morning, change from previous exam.  NGT with large volume bilious output. Will order KUB today.    Post-Op Info:  Procedure(s) (LRB):  HEMICOLECTOMY, RIGHT, extended (N/A)  OMENTECTOMY (N/A)   4 Days Post-Op      Medications:  Continuous Infusions:   dextrose 5 % and 0.45 % NaCl with KCl 20 mEq 100 mL/hr at 10/23/22 2146     Scheduled Meds:   alvimopan  12 mg Oral BID    amLODIPine  10 mg Oral Daily    enoxaparin  30 mg Subcutaneous Daily    gabapentin  300 mg Oral TID    methocarbamoL  500 mg Oral TID    mupirocin   Nasal BID    tamsulosin  0.8 mg Oral Daily     PRN Meds:   HYDROmorphone    naloxone    ondansetron    oxyCODONE    oxyCODONE        Objective:     Vital Signs (Most Recent):  Temp: 98.2 °F (36.8 °C) (10/24/22 0501)  Pulse: 99 (10/24/22 0501)  Resp: 20 (10/23/22 2208)  BP: 135/89 (10/24/22 0501)  SpO2: 95 % (10/24/22 0501) Vital Signs (24h Range):  Temp:  [98.2 °F (36.8 °C)-99.6 °F (37.6 °C)] 98.2 °F (36.8 °C)  Pulse:  [] 99  Resp:  [16-20] 20  SpO2:  [93 %-96 %] 95 %  BP: (118-145)/() 135/89     Intake/Output - Last 3 Shifts         10/22 0700  10/23 0659 10/23 0700  10/24 0659    P.O. 600 0    I.V. (mL/kg)  700 (14.6)    Total Intake(mL/kg) 600 (12.5) 700 (14.6)    Urine (mL/kg/hr) 650 (0.6) 650 (0.6)    Drains  600    Total Output 650 1250    Net -50 -550          Urine Occurrence  1 x            Physical Exam  HENT:      Nose:      Comments: NGT in place     Mouth/Throat:      Mouth: Mucous membranes are moist.   Eyes:      Pupils: Pupils are equal, round, and reactive to light.    Cardiovascular:      Rate and Rhythm: Normal rate.   Pulmonary:      Effort: Pulmonary effort is normal.   Abdominal:      General: There is no distension.      Palpations: Abdomen is soft.      Tenderness: There is abdominal tenderness. There is guarding.   Neurological:      General: No focal deficit present.      Mental Status: He is alert.       Significant Labs:  BMP (Last 3 Results):   Recent Labs   Lab 10/22/22  0338 10/23/22  0638 10/24/22  0257   * 89 121*    135* 134*   K 5.2* 4.8 4.3    101 103   CO2 24 29 22*   BUN <3* 4* 5*   CREATININE 0.6 0.7 0.6   CALCIUM 8.5* 8.7 8.4*   MG 1.7 1.6 1.6     CBC (Last 3 Results):   Recent Labs   Lab 10/22/22  0338 10/23/22  0638 10/24/22  0257   WBC 14.66* 11.49 12.30   RBC 3.94* 3.65* 3.49*   HGB 11.3* 10.5* 10.0*   HCT 35.6* 33.6* 31.6*   * 439 397   MCV 90 92 91   MCH 28.7 28.8 28.7   MCHC 31.7* 31.3* 31.6*       Significant Diagnostics:  I have reviewed all pertinent imaging results/findings within the past 24 hours.    Assessment/Plan:     Colonic mass  Javier Martinez is a 72 y.o. M s/p extended right hemicolectomy 10/20    Plan:   - NPO  - IVF with house fluids   - Oral narcotics and MM for pain control   - flomax this morning   - PRN nausea control  - NGT in place with 600 bilious out put   - KUB 10/23/2022 with dilated small bowel loops, repeat KUB today  - Change in abdominal exam this morning in the setting of ileus/obstruction possible CT scan today vs tomorrow  - continue to closely monitor bowel function   - morning labs pending - correct electrolytes as needed            Blanka Horvath MD  Colorectal Surgery  [facility]

## 2022-10-24 NOTE — PLAN OF CARE
Patient AAO x4, reporting no pain or nausea today. Midline abd incision healing well. Voiding adequate clear yellow urine. 150cc bile output from NG tube on low intermittent suction (flushed with 30cc sterile water). Two small light brown liquid bowel movements today. Pt ambulating steadily.         Problem: Fluid and Electrolyte Imbalance (Acute Kidney Injury/Impairment)  Goal: Fluid and Electrolyte Balance  Outcome: Ongoing, Progressing     Problem: Oral Intake Inadequate (Acute Kidney Injury/Impairment)  Goal: Optimal Nutrition Intake  Outcome: Ongoing, Progressing     Problem: Renal Function Impairment (Acute Kidney Injury/Impairment)  Goal: Effective Renal Function  Outcome: Ongoing, Progressing     Problem: Adult Inpatient Plan of Care  Goal: Plan of Care Review  Outcome: Ongoing, Progressing  Goal: Patient-Specific Goal (Individualized)  Outcome: Ongoing, Progressing  Goal: Absence of Hospital-Acquired Illness or Injury  Outcome: Ongoing, Progressing  Goal: Optimal Comfort and Wellbeing  Outcome: Ongoing, Progressing  Goal: Readiness for Transition of Care  Outcome: Ongoing, Progressing     Problem: Infection  Goal: Absence of Infection Signs and Symptoms  Outcome: Ongoing, Progressing     Problem: Fall Injury Risk  Goal: Absence of Fall and Fall-Related Injury  Outcome: Ongoing, Progressing     Problem: Skin Injury Risk Increased  Goal: Skin Health and Integrity  Outcome: Ongoing, Progressing

## 2022-10-24 NOTE — PLAN OF CARE
Problem: Fluid and Electrolyte Imbalance (Acute Kidney Injury/Impairment)  Goal: Fluid and Electrolyte Balance  Outcome: Ongoing, Progressing     Problem: Oral Intake Inadequate (Acute Kidney Injury/Impairment)  Goal: Optimal Nutrition Intake  Outcome: Ongoing, Progressing     Problem: Renal Function Impairment (Acute Kidney Injury/Impairment)  Goal: Effective Renal Function  Outcome: Ongoing, Progressing     Problem: Adult Inpatient Plan of Care  Goal: Plan of Care Review  Outcome: Ongoing, Progressing  Goal: Patient-Specific Goal (Individualized)  Outcome: Ongoing, Progressing  Goal: Absence of Hospital-Acquired Illness or Injury  Outcome: Ongoing, Progressing  Goal: Optimal Comfort and Wellbeing  Outcome: Ongoing, Progressing  Goal: Readiness for Transition of Care  Outcome: Ongoing, Progressing     Problem: Infection  Goal: Absence of Infection Signs and Symptoms  Outcome: Ongoing, Progressing     Problem: Fall Injury Risk  Goal: Absence of Fall and Fall-Related Injury  Outcome: Ongoing, Progressing     Problem: Skin Injury Risk Increased  Goal: Skin Health and Integrity  Outcome: Ongoing, Progressing

## 2022-10-25 LAB
ALBUMIN SERPL BCP-MCNC: 1.8 G/DL (ref 3.5–5.2)
ALP SERPL-CCNC: 58 U/L (ref 55–135)
ALT SERPL W/O P-5'-P-CCNC: 12 U/L (ref 10–44)
ANION GAP SERPL CALC-SCNC: 6 MMOL/L (ref 8–16)
AST SERPL-CCNC: 31 U/L (ref 10–40)
BASOPHILS # BLD AUTO: 0.03 K/UL (ref 0–0.2)
BASOPHILS NFR BLD: 0.3 % (ref 0–1.9)
BILIRUB SERPL-MCNC: 0.4 MG/DL (ref 0.1–1)
BUN SERPL-MCNC: 3 MG/DL (ref 8–23)
CALCIUM SERPL-MCNC: 8.2 MG/DL (ref 8.7–10.5)
CHLORIDE SERPL-SCNC: 101 MMOL/L (ref 95–110)
CO2 SERPL-SCNC: 27 MMOL/L (ref 23–29)
CREAT SERPL-MCNC: 0.6 MG/DL (ref 0.5–1.4)
CRP SERPL-MCNC: 118.7 MG/L (ref 0–8.2)
DIFFERENTIAL METHOD: ABNORMAL
EOSINOPHIL # BLD AUTO: 0.1 K/UL (ref 0–0.5)
EOSINOPHIL NFR BLD: 0.8 % (ref 0–8)
ERYTHROCYTE [DISTWIDTH] IN BLOOD BY AUTOMATED COUNT: 15.2 % (ref 11.5–14.5)
EST. GFR  (NO RACE VARIABLE): >60 ML/MIN/1.73 M^2
GLUCOSE SERPL-MCNC: 98 MG/DL (ref 70–110)
HCT VFR BLD AUTO: 31.9 % (ref 40–54)
HGB BLD-MCNC: 10 G/DL (ref 14–18)
IMM GRANULOCYTES # BLD AUTO: 0.04 K/UL (ref 0–0.04)
IMM GRANULOCYTES NFR BLD AUTO: 0.4 % (ref 0–0.5)
LYMPHOCYTES # BLD AUTO: 2.3 K/UL (ref 1–4.8)
LYMPHOCYTES NFR BLD: 21.4 % (ref 18–48)
MAGNESIUM SERPL-MCNC: 1.5 MG/DL (ref 1.6–2.6)
MCH RBC QN AUTO: 28.7 PG (ref 27–31)
MCHC RBC AUTO-ENTMCNC: 31.3 G/DL (ref 32–36)
MCV RBC AUTO: 92 FL (ref 82–98)
MONOCYTES # BLD AUTO: 1.5 K/UL (ref 0.3–1)
MONOCYTES NFR BLD: 13.9 % (ref 4–15)
NEUTROPHILS # BLD AUTO: 6.7 K/UL (ref 1.8–7.7)
NEUTROPHILS NFR BLD: 63.2 % (ref 38–73)
NRBC BLD-RTO: 0 /100 WBC
PHOSPHATE SERPL-MCNC: 2.5 MG/DL (ref 2.7–4.5)
PLATELET # BLD AUTO: 468 K/UL (ref 150–450)
PMV BLD AUTO: 9.3 FL (ref 9.2–12.9)
POTASSIUM SERPL-SCNC: 4 MMOL/L (ref 3.5–5.1)
PROT SERPL-MCNC: 5.2 G/DL (ref 6–8.4)
RBC # BLD AUTO: 3.48 M/UL (ref 4.6–6.2)
SODIUM SERPL-SCNC: 134 MMOL/L (ref 136–145)
WBC # BLD AUTO: 10.55 K/UL (ref 3.9–12.7)

## 2022-10-25 PROCEDURE — 80053 COMPREHEN METABOLIC PANEL: CPT | Performed by: STUDENT IN AN ORGANIZED HEALTH CARE EDUCATION/TRAINING PROGRAM

## 2022-10-25 PROCEDURE — 20600001 HC STEP DOWN PRIVATE ROOM

## 2022-10-25 PROCEDURE — 25000003 PHARM REV CODE 250: Performed by: STUDENT IN AN ORGANIZED HEALTH CARE EDUCATION/TRAINING PROGRAM

## 2022-10-25 PROCEDURE — 36415 COLL VENOUS BLD VENIPUNCTURE: CPT | Performed by: STUDENT IN AN ORGANIZED HEALTH CARE EDUCATION/TRAINING PROGRAM

## 2022-10-25 PROCEDURE — 85025 COMPLETE CBC W/AUTO DIFF WBC: CPT | Performed by: STUDENT IN AN ORGANIZED HEALTH CARE EDUCATION/TRAINING PROGRAM

## 2022-10-25 PROCEDURE — 63600175 PHARM REV CODE 636 W HCPCS: Performed by: STUDENT IN AN ORGANIZED HEALTH CARE EDUCATION/TRAINING PROGRAM

## 2022-10-25 PROCEDURE — 25000003 PHARM REV CODE 250: Performed by: NURSE PRACTITIONER

## 2022-10-25 PROCEDURE — 25000003 PHARM REV CODE 250

## 2022-10-25 PROCEDURE — 83735 ASSAY OF MAGNESIUM: CPT | Performed by: STUDENT IN AN ORGANIZED HEALTH CARE EDUCATION/TRAINING PROGRAM

## 2022-10-25 PROCEDURE — 63600175 PHARM REV CODE 636 W HCPCS

## 2022-10-25 PROCEDURE — 84100 ASSAY OF PHOSPHORUS: CPT | Performed by: STUDENT IN AN ORGANIZED HEALTH CARE EDUCATION/TRAINING PROGRAM

## 2022-10-25 PROCEDURE — 86140 C-REACTIVE PROTEIN: CPT | Performed by: STUDENT IN AN ORGANIZED HEALTH CARE EDUCATION/TRAINING PROGRAM

## 2022-10-25 RX ORDER — MAGNESIUM SULFATE HEPTAHYDRATE 40 MG/ML
2 INJECTION, SOLUTION INTRAVENOUS ONCE
Status: COMPLETED | OUTPATIENT
Start: 2022-10-25 | End: 2022-10-25

## 2022-10-25 RX ADMIN — METHOCARBAMOL 500 MG: 500 TABLET ORAL at 02:10

## 2022-10-25 RX ADMIN — TAMSULOSIN HYDROCHLORIDE 0.8 MG: 0.4 CAPSULE ORAL at 08:10

## 2022-10-25 RX ADMIN — DEXTROSE, SODIUM CHLORIDE, AND POTASSIUM CHLORIDE: 5; .45; .15 INJECTION INTRAVENOUS at 06:10

## 2022-10-25 RX ADMIN — METHOCARBAMOL 500 MG: 500 TABLET ORAL at 08:10

## 2022-10-25 RX ADMIN — GABAPENTIN 300 MG: 300 CAPSULE ORAL at 02:10

## 2022-10-25 RX ADMIN — DEXTROSE, SODIUM CHLORIDE, AND POTASSIUM CHLORIDE: 5; .45; .15 INJECTION INTRAVENOUS at 04:10

## 2022-10-25 RX ADMIN — ENOXAPARIN SODIUM 30 MG: 30 INJECTION SUBCUTANEOUS at 04:10

## 2022-10-25 RX ADMIN — METHOCARBAMOL 500 MG: 500 TABLET ORAL at 09:10

## 2022-10-25 RX ADMIN — GABAPENTIN 300 MG: 300 CAPSULE ORAL at 08:10

## 2022-10-25 RX ADMIN — SODIUM PHOSPHATE, MONOBASIC, MONOHYDRATE 15 MMOL: 276; 142 INJECTION, SOLUTION INTRAVENOUS at 12:10

## 2022-10-25 RX ADMIN — ALVIMOPAN 12 MG: 12 CAPSULE ORAL at 08:10

## 2022-10-25 RX ADMIN — AMLODIPINE BESYLATE 10 MG: 10 TABLET ORAL at 08:10

## 2022-10-25 RX ADMIN — MAGNESIUM SULFATE 2 G: 2 INJECTION INTRAVENOUS at 08:10

## 2022-10-25 RX ADMIN — GABAPENTIN 300 MG: 300 CAPSULE ORAL at 09:10

## 2022-10-25 RX ADMIN — ALVIMOPAN 12 MG: 12 CAPSULE ORAL at 09:10

## 2022-10-25 RX ADMIN — MUPIROCIN: 20 OINTMENT TOPICAL at 08:10

## 2022-10-25 NOTE — PLAN OF CARE
[facility]  Discharge Reassessment    Primary Care Provider: Renetta García MD    Expected Discharge Date: 10/27/2022    Reassessment (most recent)       Discharge Reassessment - 10/25/22 1537          Discharge Reassessment    Assessment Type Discharge Planning Brief Assessment     Did the patient's condition or plan change since previous assessment? No     Discharge Plan discussed with: Patient     Communicated JOSEFA with patient/caregiver Yes     Discharge Plan A Home with family     Discharge Plan B Home     DME Needed Upon Discharge  other (see comments)   Unknown at this time    Discharge Barriers Identified None     Why the patient remains in the hospital Requires continued medical care        Post-Acute Status    Hospital Resources/Appts/Education Provided Provided patient/caregiver with written discharge plan information     Discharge Delays None known at this time                     Pt not ready for discharge due to: Not medically ready  SW will remain available for families in [facility].  Currently pt has d/c plans in progress at this time.   Expected to d.c home, no needs, per medical team.     Magda Tovar LCSW  Case Management/[facility]  [phone]

## 2022-10-25 NOTE — SUBJECTIVE & OBJECTIVE
Subjective:     Interval History: NAEO. VSS. Patient progressing well and continues to have bowel movements. CRP down trending - 118 from 132 today. NGT in place with 75cc out overnight.     Post-Op Info:  Procedure(s) (LRB):  HEMICOLECTOMY, RIGHT, extended (N/A)  OMENTECTOMY (N/A)   5 Days Post-Op      Medications:  Continuous Infusions:   dextrose 5 % and 0.45 % NaCl with KCl 20 mEq 125 mL/hr at 10/25/22 0420     Scheduled Meds:   alvimopan  12 mg Oral BID    amLODIPine  10 mg Oral Daily    enoxaparin  30 mg Subcutaneous Daily    gabapentin  300 mg Oral TID    methocarbamoL  500 mg Oral TID    mupirocin   Nasal BID    tamsulosin  0.8 mg Oral Daily     PRN Meds:   HYDROmorphone    naloxone    ondansetron    oxyCODONE    oxyCODONE        Objective:     Vital Signs (Most Recent):  Temp: 98.1 °F (36.7 °C) (10/25/22 0431)  Pulse: 103 (10/25/22 0431)  Resp: 18 (10/25/22 0431)  BP: 105/72 (10/25/22 0431)  SpO2: 97 % (10/25/22 0431) Vital Signs (24h Range):  Temp:  [97 °F (36.1 °C)-98.4 °F (36.9 °C)] 98.1 °F (36.7 °C)  Pulse:  [] 103  Resp:  [18] 18  SpO2:  [95 %-98 %] 97 %  BP: (105-136)/(72-94) 105/72     Intake/Output - Last 3 Shifts         10/23 0700  10/24 0659 10/24 0700  10/25 0659 10/25 0700  10/26 0659    P.O. 0 0     I.V. (mL/kg) 700 (14.6) 500 (10.4)     Total Intake(mL/kg) 700 (14.6) 500 (10.4)     Urine (mL/kg/hr) 650 (0.6) 2100 (1.8)     Drains 600 75     Stool  0     Total Output 1250 2175     Net -550 -1675            Urine Occurrence 1 x      Stool Occurrence  2 x             Physical Exam  HENT:      Nose:      Comments: NGT in place     Mouth/Throat:      Mouth: Mucous membranes are moist.   Eyes:      Pupils: Pupils are equal, round, and reactive to light.   Cardiovascular:      Rate and Rhythm: Normal rate.   Pulmonary:      Effort: Pulmonary effort is normal.   Abdominal:      General: There is no distension.      Palpations: Abdomen is soft.      Tenderness: There is abdominal tenderness.  There is no guarding.   Neurological:      General: No focal deficit present.      Mental Status: He is alert.       Significant Labs:  BMP (Last 3 Results):   Recent Labs   Lab 10/23/22  0638 10/24/22  0257 10/25/22  0405   GLU 89 121* 98   * 134* 134*   K 4.8 4.3 4.0    103 101   CO2 29 22* 27   BUN 4* 5* 3*   CREATININE 0.7 0.6 0.6   CALCIUM 8.7 8.4* 8.2*   MG 1.6 1.6 1.5*     CBC (Last 3 Results):   Recent Labs   Lab 10/23/22  0638 10/24/22  0257 10/25/22  0405   WBC 11.49 12.30 10.55   RBC 3.65* 3.49* 3.48*   HGB 10.5* 10.0* 10.0*   HCT 33.6* 31.6* 31.9*    397 468*   MCV 92 91 92   MCH 28.8 28.7 28.7   MCHC 31.3* 31.6* 31.3*       Significant Diagnostics:  I have reviewed all pertinent imaging results/findings within the past 24 hours.

## 2022-10-25 NOTE — PLAN OF CARE
Pt a&o x4. Pt resting in bed. Gave pt night medication clamped NG tube. Pt had no complication to medications given. Pt uses urinal appropriately, still waiting to have a bowel movement. Pt has not had any other needs. Will continue to monitor pt for changes

## 2022-10-25 NOTE — PROGRESS NOTES
[facility]  Colorectal Surgery  Progress Note    Patient Name: Javier Downs  MRN: [id number]  Admission Date: 10/20/2022  Hospital Length of Stay: 5 days  Attending Physician: EDILMA Bonilla MD    Subjective:     Interval History: NAEO. VSS. Patient progressing well and continues to have bowel movements. CRP down trending - 118 today from 132. NGT in place with 75cc out overnight.     Post-Op Info:  Procedure(s) (LRB):  HEMICOLECTOMY, RIGHT, extended (N/A)  OMENTECTOMY (N/A)   5 Days Post-Op      Medications:  Continuous Infusions:   dextrose 5 % and 0.45 % NaCl with KCl 20 mEq 125 mL/hr at 10/25/22 0420     Scheduled Meds:   alvimopan  12 mg Oral BID    amLODIPine  10 mg Oral Daily    enoxaparin  30 mg Subcutaneous Daily    gabapentin  300 mg Oral TID    methocarbamoL  500 mg Oral TID    mupirocin   Nasal BID    tamsulosin  0.8 mg Oral Daily     PRN Meds:   HYDROmorphone    naloxone    ondansetron    oxyCODONE    oxyCODONE        Objective:     Vital Signs (Most Recent):  Temp: 98.1 °F (36.7 °C) (10/25/22 0431)  Pulse: 103 (10/25/22 0431)  Resp: 18 (10/25/22 0431)  BP: 105/72 (10/25/22 0431)  SpO2: 97 % (10/25/22 0431) Vital Signs (24h Range):  Temp:  [97 °F (36.1 °C)-98.4 °F (36.9 °C)] 98.1 °F (36.7 °C)  Pulse:  [] 103  Resp:  [18] 18  SpO2:  [95 %-98 %] 97 %  BP: (105-136)/(72-94) 105/72     Intake/Output - Last 3 Shifts         10/23 0700  10/24 0659 10/24 0700  10/25 0659 10/25 0700  10/26 0659    P.O. 0 0     I.V. (mL/kg) 700 (14.6) 500 (10.4)     Total Intake(mL/kg) 700 (14.6) 500 (10.4)     Urine (mL/kg/hr) 650 (0.6) 2100 (1.8)     Drains 600 75     Stool  0     Total Output 1250 2125     Net -550 -4250            Urine Occurrence 1 x      Stool Occurrence  2 x             Physical Exam  HENT:      Nose:      Comments: NGT in place     Mouth/Throat:      Mouth: Mucous membranes are moist.   Eyes:      Pupils: Pupils are equal, round, and reactive to light.   Cardiovascular:      Rate  and Rhythm: Normal rate.   Pulmonary:      Effort: Pulmonary effort is normal.   Abdominal:      General: There is no distension.      Palpations: Abdomen is soft.      Tenderness: There is abdominal tenderness. There is no guarding.   Neurological:      General: No focal deficit present.      Mental Status: He is alert.       Significant Labs:  BMP (Last 3 Results):   Recent Labs   Lab 10/23/22  0638 10/24/22  0257 10/25/22  0405   GLU 89 121* 98   * 134* 134*   K 4.8 4.3 4.0    103 101   CO2 29 22* 27   BUN 4* 5* 3*   CREATININE 0.7 0.6 0.6   CALCIUM 8.7 8.4* 8.2*   MG 1.6 1.6 1.5*     CBC (Last 3 Results):   Recent Labs   Lab 10/23/22  0638 10/24/22  0257 10/25/22  0405   WBC 11.49 12.30 10.55   RBC 3.65* 3.49* 3.48*   HGB 10.5* 10.0* 10.0*   HCT 33.6* 31.6* 31.9*    397 468*   MCV 92 91 92   MCH 28.8 28.7 28.7   MCHC 31.3* 31.6* 31.3*       Significant Diagnostics:  I have reviewed all pertinent imaging results/findings within the past 24 hours.    Assessment/Plan:     Colonic mass  Javier Martinez is a 72 y.o. M s/p extended right hemicolectomy 10/20    Plan:   - NPO  - IVF with house fluids @ 125ml/h  - Oral narcotics and MM for pain control   - flomax    - PRN nausea control  - NGT in place with 75cc output   - KUB 10/24/2022 with improve bowel dilation    - will review with staff if dc NGT today   - having bowel movements       Blanka Horvath MD  Colorectal Surgery  [facility]

## 2022-10-25 NOTE — PLAN OF CARE
Pt AAOx4, VSS, ambulating independently in room. NG tube removed this morning. Pt tolerating oral meds and sips with no nausea or vomiting. Two soft bowel movements today. Pt voiding adequate urine to urinal. Treated electrolytes per orders. Midline abdominal incision w/ dermabond healing well, open to air with no drainage. Will continue to monitor.    Problem: Fluid and Electrolyte Imbalance (Acute Kidney Injury/Impairment)  Goal: Fluid and Electrolyte Balance  Outcome: Ongoing, Progressing     Problem: Oral Intake Inadequate (Acute Kidney Injury/Impairment)  Goal: Optimal Nutrition Intake  Outcome: Ongoing, Progressing     Problem: Renal Function Impairment (Acute Kidney Injury/Impairment)  Goal: Effective Renal Function  Outcome: Ongoing, Progressing     Problem: Adult Inpatient Plan of Care  Goal: Plan of Care Review  Outcome: Ongoing, Progressing  Goal: Patient-Specific Goal (Individualized)  Outcome: Ongoing, Progressing  Goal: Absence of Hospital-Acquired Illness or Injury  Outcome: Ongoing, Progressing  Goal: Optimal Comfort and Wellbeing  Outcome: Ongoing, Progressing  Goal: Readiness for Transition of Care  Outcome: Ongoing, Progressing     Problem: Infection  Goal: Absence of Infection Signs and Symptoms  Outcome: Ongoing, Progressing     Problem: Fall Injury Risk  Goal: Absence of Fall and Fall-Related Injury  Outcome: Ongoing, Progressing     Problem: Skin Injury Risk Increased  Goal: Skin Health and Integrity  Outcome: Ongoing, Progressing

## 2022-10-26 LAB
ALBUMIN SERPL BCP-MCNC: 1.7 G/DL (ref 3.5–5.2)
ALP SERPL-CCNC: 67 U/L (ref 55–135)
ALT SERPL W/O P-5'-P-CCNC: 12 U/L (ref 10–44)
ANION GAP SERPL CALC-SCNC: 8 MMOL/L (ref 8–16)
AST SERPL-CCNC: 33 U/L (ref 10–40)
BASOPHILS # BLD AUTO: 0.03 K/UL (ref 0–0.2)
BASOPHILS NFR BLD: 0.3 % (ref 0–1.9)
BILIRUB SERPL-MCNC: 0.4 MG/DL (ref 0.1–1)
BUN SERPL-MCNC: 3 MG/DL (ref 8–23)
CALCIUM SERPL-MCNC: 8.1 MG/DL (ref 8.7–10.5)
CHLORIDE SERPL-SCNC: 103 MMOL/L (ref 95–110)
CO2 SERPL-SCNC: 24 MMOL/L (ref 23–29)
CREAT SERPL-MCNC: 0.7 MG/DL (ref 0.5–1.4)
CRP SERPL-MCNC: 107.2 MG/L (ref 0–8.2)
DIFFERENTIAL METHOD: ABNORMAL
EOSINOPHIL # BLD AUTO: 0.2 K/UL (ref 0–0.5)
EOSINOPHIL NFR BLD: 2.1 % (ref 0–8)
ERYTHROCYTE [DISTWIDTH] IN BLOOD BY AUTOMATED COUNT: 15.3 % (ref 11.5–14.5)
EST. GFR  (NO RACE VARIABLE): >60 ML/MIN/1.73 M^2
GLUCOSE SERPL-MCNC: 103 MG/DL (ref 70–110)
HCT VFR BLD AUTO: 27.6 % (ref 40–54)
HGB BLD-MCNC: 9 G/DL (ref 14–18)
IMM GRANULOCYTES # BLD AUTO: 0.03 K/UL (ref 0–0.04)
IMM GRANULOCYTES NFR BLD AUTO: 0.3 % (ref 0–0.5)
LYMPHOCYTES # BLD AUTO: 1.6 K/UL (ref 1–4.8)
LYMPHOCYTES NFR BLD: 17.8 % (ref 18–48)
MAGNESIUM SERPL-MCNC: 1.8 MG/DL (ref 1.6–2.6)
MCH RBC QN AUTO: 28.5 PG (ref 27–31)
MCHC RBC AUTO-ENTMCNC: 32.6 G/DL (ref 32–36)
MCV RBC AUTO: 87 FL (ref 82–98)
MONOCYTES # BLD AUTO: 1.4 K/UL (ref 0.3–1)
MONOCYTES NFR BLD: 16.2 % (ref 4–15)
NEUTROPHILS # BLD AUTO: 5.6 K/UL (ref 1.8–7.7)
NEUTROPHILS NFR BLD: 63.3 % (ref 38–73)
NRBC BLD-RTO: 0 /100 WBC
PHOSPHATE SERPL-MCNC: 3.5 MG/DL (ref 2.7–4.5)
PLATELET # BLD AUTO: 448 K/UL (ref 150–450)
PMV BLD AUTO: 8.8 FL (ref 9.2–12.9)
POTASSIUM SERPL-SCNC: 4.3 MMOL/L (ref 3.5–5.1)
PROT SERPL-MCNC: 5 G/DL (ref 6–8.4)
RBC # BLD AUTO: 3.16 M/UL (ref 4.6–6.2)
SODIUM SERPL-SCNC: 135 MMOL/L (ref 136–145)
WBC # BLD AUTO: 8.89 K/UL (ref 3.9–12.7)

## 2022-10-26 PROCEDURE — 63600175 PHARM REV CODE 636 W HCPCS: Performed by: STUDENT IN AN ORGANIZED HEALTH CARE EDUCATION/TRAINING PROGRAM

## 2022-10-26 PROCEDURE — 25000003 PHARM REV CODE 250

## 2022-10-26 PROCEDURE — 80053 COMPREHEN METABOLIC PANEL: CPT | Performed by: STUDENT IN AN ORGANIZED HEALTH CARE EDUCATION/TRAINING PROGRAM

## 2022-10-26 PROCEDURE — 83735 ASSAY OF MAGNESIUM: CPT | Performed by: STUDENT IN AN ORGANIZED HEALTH CARE EDUCATION/TRAINING PROGRAM

## 2022-10-26 PROCEDURE — 36415 COLL VENOUS BLD VENIPUNCTURE: CPT | Performed by: STUDENT IN AN ORGANIZED HEALTH CARE EDUCATION/TRAINING PROGRAM

## 2022-10-26 PROCEDURE — 20600001 HC STEP DOWN PRIVATE ROOM

## 2022-10-26 PROCEDURE — 86140 C-REACTIVE PROTEIN: CPT | Performed by: STUDENT IN AN ORGANIZED HEALTH CARE EDUCATION/TRAINING PROGRAM

## 2022-10-26 PROCEDURE — 85025 COMPLETE CBC W/AUTO DIFF WBC: CPT | Performed by: STUDENT IN AN ORGANIZED HEALTH CARE EDUCATION/TRAINING PROGRAM

## 2022-10-26 PROCEDURE — 84100 ASSAY OF PHOSPHORUS: CPT | Performed by: STUDENT IN AN ORGANIZED HEALTH CARE EDUCATION/TRAINING PROGRAM

## 2022-10-26 PROCEDURE — 25000003 PHARM REV CODE 250: Performed by: STUDENT IN AN ORGANIZED HEALTH CARE EDUCATION/TRAINING PROGRAM

## 2022-10-26 PROCEDURE — 63600175 PHARM REV CODE 636 W HCPCS

## 2022-10-26 PROCEDURE — 25000003 PHARM REV CODE 250: Performed by: NURSE PRACTITIONER

## 2022-10-26 RX ORDER — OXYCODONE HYDROCHLORIDE 5 MG/1
5 TABLET ORAL EVERY 6 HOURS PRN
Status: DISCONTINUED | OUTPATIENT
Start: 2022-10-26 | End: 2022-11-02 | Stop reason: HOSPADM

## 2022-10-26 RX ORDER — TRAMADOL HYDROCHLORIDE 50 MG/1
50 TABLET ORAL EVERY 6 HOURS PRN
Status: DISCONTINUED | OUTPATIENT
Start: 2022-10-26 | End: 2022-11-02 | Stop reason: HOSPADM

## 2022-10-26 RX ADMIN — TRAMADOL HYDROCHLORIDE 50 MG: 50 TABLET, COATED ORAL at 09:10

## 2022-10-26 RX ADMIN — AMLODIPINE BESYLATE 10 MG: 10 TABLET ORAL at 09:10

## 2022-10-26 RX ADMIN — GABAPENTIN 300 MG: 300 CAPSULE ORAL at 09:10

## 2022-10-26 RX ADMIN — ALVIMOPAN 12 MG: 12 CAPSULE ORAL at 09:10

## 2022-10-26 RX ADMIN — METHOCARBAMOL 500 MG: 500 TABLET ORAL at 02:10

## 2022-10-26 RX ADMIN — OXYCODONE 5 MG: 5 TABLET ORAL at 09:10

## 2022-10-26 RX ADMIN — DEXTROSE, SODIUM CHLORIDE, AND POTASSIUM CHLORIDE: 5; .45; .15 INJECTION INTRAVENOUS at 10:10

## 2022-10-26 RX ADMIN — ALVIMOPAN 12 MG: 12 CAPSULE ORAL at 10:10

## 2022-10-26 RX ADMIN — METHOCARBAMOL 500 MG: 500 TABLET ORAL at 09:10

## 2022-10-26 RX ADMIN — METHOCARBAMOL 500 MG: 500 TABLET ORAL at 10:10

## 2022-10-26 RX ADMIN — DEXTROSE, SODIUM CHLORIDE, AND POTASSIUM CHLORIDE: 5; .45; .15 INJECTION INTRAVENOUS at 07:10

## 2022-10-26 RX ADMIN — ENOXAPARIN SODIUM 30 MG: 30 INJECTION SUBCUTANEOUS at 04:10

## 2022-10-26 RX ADMIN — TAMSULOSIN HYDROCHLORIDE 0.8 MG: 0.4 CAPSULE ORAL at 09:10

## 2022-10-26 NOTE — ASSESSMENT & PLAN NOTE
Javier Martinez is a 72 y.o. M s/p extended right hemicolectomy 10/20    Plan:   - advance to LRD.   - decrease mIVF to 75 from 125  - decrease narcotic pain medication dose  - d/c gabapentin  - flomax    - PRN nausea control  - PT/OT  - dispo pending how he tolerates diet.

## 2022-10-26 NOTE — PROGRESS NOTES
[facility]  Colorectal Surgery  Progress Note    Patient Name: Javier Downs  MRN: [id number]  Admission Date: 10/20/2022  Hospital Length of Stay: 6 days  Attending Physician: EDILMA Bonilla MD    Subjective:     Interval History: No acute events overnight. NG removed yesterday and tolerated clears without nausea or vomiting. Continues to pass flatus and have bowel movements. States pain and bloating continue to improve.     Post-Op Info:  Procedure(s) (LRB):  HEMICOLECTOMY, RIGHT, extended (N/A)  OMENTECTOMY (N/A)   6 Days Post-Op      Medications:  Continuous Infusions:   dextrose 5 % and 0.45 % NaCl with KCl 20 mEq 125 mL/hr at 10/26/22 0751     Scheduled Meds:   alvimopan  12 mg Oral BID    amLODIPine  10 mg Oral Daily    enoxaparin  30 mg Subcutaneous Daily    methocarbamoL  500 mg Oral TID    tamsulosin  0.8 mg Oral Daily     PRN Meds:   HYDROmorphone    naloxone    ondansetron    oxyCODONE    traMADoL        Objective:     Vital Signs (Most Recent):  Temp: 98 °F (36.7 °C) (10/26/22 0850)  Pulse: 95 (10/26/22 0850)  Resp: 18 (10/26/22 0850)  BP: 101/67 (10/26/22 0850)  SpO2: 97 % (10/26/22 0850) Vital Signs (24h Range):  Temp:  [98 °F (36.7 °C)-99.2 °F (37.3 °C)] 98 °F (36.7 °C)  Pulse:  [] 95  Resp:  [15-18] 18  SpO2:  [94 %-99 %] 97 %  BP: (101-109)/(61-67) 101/67     Intake/Output - Last 3 Shifts         10/24 0700  10/25 0659 10/25 0700  10/26 0659 10/26 0700  10/27 0659    P.O. 0      I.V. (mL/kg) 500 (10.4)      Total Intake(mL/kg) 500 (10.4)      Urine (mL/kg/hr) 2100 (1.8) 300 (0.3) 350 (2.9)    Drains 75      Stool 0      Total Output 2175 300 350    Net -1675 -300 -350           Stool Occurrence 2 x 3 x             Physical Exam  Vitals and nursing note reviewed.   Cardiovascular:      Rate and Rhythm: Normal rate.   Pulmonary:      Effort: Pulmonary effort is normal. No respiratory distress.   Abdominal:      General: There is distension.      Tenderness: There is abdominal  tenderness.      Comments: Mild midline incisional tenderness with some tenderness over right side of abdomen that has focal and more significant than left. This continues to improve. Moderate distension, also improving.      Significant Labs:  BMP:   Recent Labs   Lab 10/26/22  0226      *   K 4.3      CO2 24   BUN 3*   CREATININE 0.7   CALCIUM 8.1*   MG 1.8     CBC:   Recent Labs   Lab 10/26/22  0226   WBC 8.89   RBC 3.16*   HGB 9.0*   HCT 27.6*      MCV 87   MCH 28.5   MCHC 32.6     CRP:   Recent Labs   Lab 10/26/22  0226   .2*       Significant Diagnostics:  None.     Assessment/Plan:     Colonic mass  Javier Martinez is a 72 y.o. M s/p extended right hemicolectomy 10/20    Plan:   - advance to LRD.   - decrease mIVF to 75 from 125  - decrease narcotic pain medication dose  - d/c gabapentin  - flomax    - PRN nausea control  - PT/OT  - dispo pending how he tolerates diet.             Jose Gore MD  Colorectal Surgery  [facility]

## 2022-10-26 NOTE — PLAN OF CARE
Pt is A&Ox4 injury free. Breathing is regular equal and unlabored bilaterally on room air. Pt did receive a sponge bath from his wife on night shift. Fluids infused as ordered during the night shift.

## 2022-10-26 NOTE — SUBJECTIVE & OBJECTIVE
Subjective:     Interval History: No acute events overnight. NG removed yesterday and tolerated clears without nausea or vomiting. Continues to pass flatus and have bowel movements. States pain and bloating continue to improve.     Post-Op Info:  Procedure(s) (LRB):  HEMICOLECTOMY, RIGHT, extended (N/A)  OMENTECTOMY (N/A)   6 Days Post-Op      Medications:  Continuous Infusions:   dextrose 5 % and 0.45 % NaCl with KCl 20 mEq 125 mL/hr at 10/26/22 0751     Scheduled Meds:   alvimopan  12 mg Oral BID    amLODIPine  10 mg Oral Daily    enoxaparin  30 mg Subcutaneous Daily    methocarbamoL  500 mg Oral TID    tamsulosin  0.8 mg Oral Daily     PRN Meds:   HYDROmorphone    naloxone    ondansetron    oxyCODONE    traMADoL        Objective:     Vital Signs (Most Recent):  Temp: 98 °F (36.7 °C) (10/26/22 0850)  Pulse: 95 (10/26/22 0850)  Resp: 18 (10/26/22 0850)  BP: 101/67 (10/26/22 0850)  SpO2: 97 % (10/26/22 0850) Vital Signs (24h Range):  Temp:  [98 °F (36.7 °C)-99.2 °F (37.3 °C)] 98 °F (36.7 °C)  Pulse:  [] 95  Resp:  [15-18] 18  SpO2:  [94 %-99 %] 97 %  BP: (101-109)/(61-67) 101/67     Intake/Output - Last 3 Shifts         10/24 0700  10/25 0659 10/25 0700  10/26 0659 10/26 0700  10/27 0659    P.O. 0      I.V. (mL/kg) 500 (10.4)      Total Intake(mL/kg) 500 (10.4)      Urine (mL/kg/hr) 2100 (1.8) 300 (0.3) 350 (2.9)    Drains 75      Stool 0      Total Output 2175 300 350    Net -1675 -300 -350           Stool Occurrence 2 x 3 x             Physical Exam  Vitals and nursing note reviewed.   Cardiovascular:      Rate and Rhythm: Normal rate.   Pulmonary:      Effort: Pulmonary effort is normal. No respiratory distress.   Abdominal:      General: There is distension.      Tenderness: There is abdominal tenderness.      Comments: Mild midline incisional tenderness with some tenderness over right side of abdomen that has focal and more significant than left. This continues to improve. Moderate distension, also  improving.      Significant Labs:  BMP:   Recent Labs   Lab 10/26/22  0226      *   K 4.3      CO2 24   BUN 3*   CREATININE 0.7   CALCIUM 8.1*   MG 1.8     CBC:   Recent Labs   Lab 10/26/22  0226   WBC 8.89   RBC 3.16*   HGB 9.0*   HCT 27.6*      MCV 87   MCH 28.5   MCHC 32.6     CRP:   Recent Labs   Lab 10/26/22  0226   .2*       Significant Diagnostics:  None.

## 2022-10-27 LAB
ALBUMIN SERPL BCP-MCNC: 1.9 G/DL (ref 3.5–5.2)
ALP SERPL-CCNC: 77 U/L (ref 55–135)
ALT SERPL W/O P-5'-P-CCNC: 14 U/L (ref 10–44)
ANION GAP SERPL CALC-SCNC: 11 MMOL/L (ref 8–16)
AST SERPL-CCNC: 38 U/L (ref 10–40)
BASOPHILS # BLD AUTO: 0.03 K/UL (ref 0–0.2)
BASOPHILS NFR BLD: 0.3 % (ref 0–1.9)
BILIRUB SERPL-MCNC: 0.5 MG/DL (ref 0.1–1)
BUN SERPL-MCNC: 3 MG/DL (ref 8–23)
CALCIUM SERPL-MCNC: 8.4 MG/DL (ref 8.7–10.5)
CHLORIDE SERPL-SCNC: 102 MMOL/L (ref 95–110)
CO2 SERPL-SCNC: 21 MMOL/L (ref 23–29)
COMMENT: NORMAL
CREAT SERPL-MCNC: 0.6 MG/DL (ref 0.5–1.4)
CRP SERPL-MCNC: 121.4 MG/L (ref 0–8.2)
DIFFERENTIAL METHOD: ABNORMAL
EOSINOPHIL # BLD AUTO: 0.1 K/UL (ref 0–0.5)
EOSINOPHIL NFR BLD: 0.6 % (ref 0–8)
ERYTHROCYTE [DISTWIDTH] IN BLOOD BY AUTOMATED COUNT: 15.5 % (ref 11.5–14.5)
EST. GFR  (NO RACE VARIABLE): >60 ML/MIN/1.73 M^2
FINAL PATHOLOGIC DIAGNOSIS: NORMAL
GLUCOSE SERPL-MCNC: 95 MG/DL (ref 70–110)
GROSS: NORMAL
HCT VFR BLD AUTO: 28.9 % (ref 40–54)
HGB BLD-MCNC: 9.1 G/DL (ref 14–18)
IMM GRANULOCYTES # BLD AUTO: 0.06 K/UL (ref 0–0.04)
IMM GRANULOCYTES NFR BLD AUTO: 0.5 % (ref 0–0.5)
LYMPHOCYTES # BLD AUTO: 2.8 K/UL (ref 1–4.8)
LYMPHOCYTES NFR BLD: 25 % (ref 18–48)
Lab: NORMAL
MAGNESIUM SERPL-MCNC: 1.6 MG/DL (ref 1.6–2.6)
MCH RBC QN AUTO: 28.7 PG (ref 27–31)
MCHC RBC AUTO-ENTMCNC: 31.5 G/DL (ref 32–36)
MCV RBC AUTO: 91 FL (ref 82–98)
MONOCYTES # BLD AUTO: 1.3 K/UL (ref 0.3–1)
MONOCYTES NFR BLD: 12.1 % (ref 4–15)
NEUTROPHILS # BLD AUTO: 6.8 K/UL (ref 1.8–7.7)
NEUTROPHILS NFR BLD: 61.5 % (ref 38–73)
NRBC BLD-RTO: 0 /100 WBC
PHOSPHATE SERPL-MCNC: 3.3 MG/DL (ref 2.7–4.5)
PLATELET # BLD AUTO: 482 K/UL (ref 150–450)
PMV BLD AUTO: 9.1 FL (ref 9.2–12.9)
POTASSIUM SERPL-SCNC: 3.9 MMOL/L (ref 3.5–5.1)
PROT SERPL-MCNC: 5.4 G/DL (ref 6–8.4)
RBC # BLD AUTO: 3.17 M/UL (ref 4.6–6.2)
SODIUM SERPL-SCNC: 134 MMOL/L (ref 136–145)
WBC # BLD AUTO: 11.08 K/UL (ref 3.9–12.7)

## 2022-10-27 PROCEDURE — 25000003 PHARM REV CODE 250

## 2022-10-27 PROCEDURE — 20600001 HC STEP DOWN PRIVATE ROOM

## 2022-10-27 PROCEDURE — 63600175 PHARM REV CODE 636 W HCPCS: Performed by: STUDENT IN AN ORGANIZED HEALTH CARE EDUCATION/TRAINING PROGRAM

## 2022-10-27 PROCEDURE — 85025 COMPLETE CBC W/AUTO DIFF WBC: CPT | Performed by: STUDENT IN AN ORGANIZED HEALTH CARE EDUCATION/TRAINING PROGRAM

## 2022-10-27 PROCEDURE — 80053 COMPREHEN METABOLIC PANEL: CPT | Performed by: STUDENT IN AN ORGANIZED HEALTH CARE EDUCATION/TRAINING PROGRAM

## 2022-10-27 PROCEDURE — 83735 ASSAY OF MAGNESIUM: CPT | Performed by: STUDENT IN AN ORGANIZED HEALTH CARE EDUCATION/TRAINING PROGRAM

## 2022-10-27 PROCEDURE — 25000003 PHARM REV CODE 250: Performed by: STUDENT IN AN ORGANIZED HEALTH CARE EDUCATION/TRAINING PROGRAM

## 2022-10-27 PROCEDURE — 86140 C-REACTIVE PROTEIN: CPT | Performed by: STUDENT IN AN ORGANIZED HEALTH CARE EDUCATION/TRAINING PROGRAM

## 2022-10-27 PROCEDURE — 84100 ASSAY OF PHOSPHORUS: CPT | Performed by: STUDENT IN AN ORGANIZED HEALTH CARE EDUCATION/TRAINING PROGRAM

## 2022-10-27 PROCEDURE — 36415 COLL VENOUS BLD VENIPUNCTURE: CPT | Performed by: STUDENT IN AN ORGANIZED HEALTH CARE EDUCATION/TRAINING PROGRAM

## 2022-10-27 RX ADMIN — ALVIMOPAN 12 MG: 12 CAPSULE ORAL at 10:10

## 2022-10-27 RX ADMIN — AMLODIPINE BESYLATE 10 MG: 10 TABLET ORAL at 08:10

## 2022-10-27 RX ADMIN — METHOCARBAMOL 500 MG: 500 TABLET ORAL at 08:10

## 2022-10-27 RX ADMIN — TRAMADOL HYDROCHLORIDE 50 MG: 50 TABLET, COATED ORAL at 06:10

## 2022-10-27 RX ADMIN — OXYCODONE 5 MG: 5 TABLET ORAL at 06:10

## 2022-10-27 RX ADMIN — ENOXAPARIN SODIUM 30 MG: 30 INJECTION SUBCUTANEOUS at 04:10

## 2022-10-27 RX ADMIN — METHOCARBAMOL 500 MG: 500 TABLET ORAL at 02:10

## 2022-10-27 RX ADMIN — DEXTROSE, SODIUM CHLORIDE, AND POTASSIUM CHLORIDE: 5; .45; .15 INJECTION INTRAVENOUS at 06:10

## 2022-10-27 RX ADMIN — OXYCODONE 5 MG: 5 TABLET ORAL at 08:10

## 2022-10-27 RX ADMIN — DEXTROSE, SODIUM CHLORIDE, AND POTASSIUM CHLORIDE: 5; .45; .15 INJECTION INTRAVENOUS at 11:10

## 2022-10-27 RX ADMIN — TAMSULOSIN HYDROCHLORIDE 0.8 MG: 0.4 CAPSULE ORAL at 08:10

## 2022-10-27 RX ADMIN — ONDANSETRON 4 MG: 2 INJECTION INTRAMUSCULAR; INTRAVENOUS at 09:10

## 2022-10-27 RX ADMIN — TRAMADOL HYDROCHLORIDE 50 MG: 50 TABLET, COATED ORAL at 08:10

## 2022-10-27 NOTE — PROGRESS NOTES
Patient is seen in follow-up of his stage IV transverse colon cancer.  He denies any new abdominal pain or nausea or vomiting.  He denies any fevers.    Past Medical History:   Diagnosis Date    MARIA A (acute kidney injury) 8/18/2022    Hypertension     Metastatic colon cancer to liver 4/22/2021     Past Surgical History:   Procedure Laterality Date    COLONOSCOPY N/A 4/20/2021    Procedure: COLONOSCOPY with possible stent;  Surgeon: EDILMA Bonilla MD;  Location: [facility] (2ND FLR);  Service: Colon and Rectal;  Laterality: N/A;    DIAGNOSTIC LAPAROSCOPY N/A 9/7/2022    Procedure: LAPAROSCOPY, DIAGNOSTIC;  Surgeon: Adrian Rodriguez MD;  Location: [facility] OR 2ND FLR;  Service: General;  Laterality: N/A;    INSERTION OF TUNNELED CENTRAL VENOUS CATHETER (CVC) WITH SUBCUTANEOUS PORT N/A 5/3/2021    Procedure: [id number];  Surgeon: Francisco Layton MD;  Location: [facility] OR 2ND FLR;  Service: Vascular;  Laterality: N/A;    OMENTECTOMY N/A 10/20/2022    Procedure: OMENTECTOMY;  Surgeon: EDILMA Bonilla MD;  Location: [facility] OR 2ND FLR;  Service: Colon and Rectal;  Laterality: N/A;    RIGHT HEMICOLECTOMY N/A 10/20/2022    Procedure: HEMICOLECTOMY, RIGHT, extended;  Surgeon: EDILMA Bonilla MD;  Location: [facility] OR 2ND FLR;  Service: Colon and Rectal;  Laterality: N/A;  Extended right hemicolectomy CONSENT IN AM     Review of patient's allergies indicates:  No Known Allergies    Current Facility-Administered Medications on File Prior to Visit   Medication Dose Route Frequency Provider Last Rate Last Admin    [COMPLETED] alvimopan capsule 12 mg  12 mg Oral BID Florentin Coto MD   12 mg at 10/27/22 1049    amLODIPine tablet 10 mg  10 mg Oral Daily Florentin Coto MD   10 mg at 10/27/22 0822    dextrose 5 % and 0.45 % NaCl with KCl 20 mEq infusion   Intravenous Continuous Jose Gore  mL/hr at 10/27/22 1112 New Bag at 10/27/22 1112    enoxaparin injection 30 mg  30 mg Subcutaneous Daily Stephanie Jimenez MD   30 mg at 10/26/22  1642    HYDROmorphone injection 0.2 mg  0.2 mg Intravenous Q4H PRN Blanka Horvath MD   0.2 mg at 10/23/22 2208    methocarbamoL tablet 500 mg  500 mg Oral TID Blanka Horvath MD   500 mg at 10/27/22 1424    naloxone 0.4 mg/mL injection 0.02 mg  0.02 mg Intravenous PRN Florentin Coto MD        [COMPLETED] ondansetron injection 4 mg  4 mg Intravenous Once PRN Florentin Coto MD   4 mg at 10/27/22 0954    oxyCODONE immediate release tablet 5 mg  5 mg Oral Q6H PRN Jose Gore MD   5 mg at 10/27/22 0822    tamsulosin 24 hr capsule 0.8 mg  0.8 mg Oral Daily Florentin Coto MD   0.8 mg at 10/27/22 0822    traMADoL tablet 50 mg  50 mg Oral Q6H PRN Jose Gore MD   50 mg at 10/27/22 0600     Current Outpatient Medications on File Prior to Visit   Medication Sig Dispense Refill    acetaminophen (TYLENOL) 500 MG tablet Take 1 tablet (500 mg total) by mouth every 6 (six) hours as needed for Pain (alternate with ibuprofen).  0    amLODIPine (NORVASC) 10 MG tablet Take 1 tablet (10 mg total) by mouth once daily. 90 tablet 3    ciprofloxacin HCl (CIPRO) 500 MG tablet Take 1 tablet (500 mg total) by mouth 2 (two) times daily. 2 tablet 0    ibuprofen (ADVIL,MOTRIN) 400 MG tablet Take 1 tablet (400 mg total) by mouth every 6 (six) hours as needed for Other (pain). Alternate with tylenol      LIDOcaine-prilocaine (EMLA) cream Apply topically as needed (Apply one hour before treatment). 30 g 5    metroNIDAZOLE (FLAGYL) 500 MG tablet Take 1 tablet (500 mg total) by mouth 2 (two) times daily. 2 tablet 0    ondansetron (ZOFRAN) 4 MG tablet Take 1 tablet (4 mg total) by mouth every 8 (eight) hours as needed for Nausea. 30 tablet 3       No family history on file.    Social History     Socioeconomic History    Marital status:    Tobacco Use    Smoking status: Never    Smokeless tobacco: Never   Substance and Sexual Activity    Alcohol use: Yes     ROS:  GENERAL: No fever, chills, fatigability or weight loss.  Integument:  No rashes, redness,  icterus  CHEST: Denies VARGAS, cyanosis, wheezing, cough and sputum production.  CARDIOVASCULAR: Denies chest pain, PND, orthopnea or reduced exercise tolerance.  GI:  Denies abd pain, dysphagia, nausea, vomiting, no hematemesis, no rectal pain  : Denies burning on urination, no hematuria, no bacteriuria  MSK:  No deformities, swelling, joint pain swelling  Neurologic:  No HAs, seizures, weakness, paresthesias, gait problems    Assessment  Obstructing tumor of the transverse colon  Hepatic metastases  Good performance status    Plan  Patient,his daughter and I discussed the upcoming surgery and the expected functional consequence.  He understands that we will not be performing hepatectomy.  The expected hospital course, recuperation and returned to regular activities was also discussed.  We will proceed with surgery

## 2022-10-27 NOTE — PLAN OF CARE
Pt aaox4. No s/s of distress. No complaints aside from mild pain this morning. VSS. Bed in lowest position. Call light within reach. Pt up ad sushant to use urinal. Pt has IVF @75cc/hr. Pt on room air. Pt anxious for discharge today.       Problem: Fluid and Electrolyte Imbalance (Acute Kidney Injury/Impairment)  Goal: Fluid and Electrolyte Balance  Outcome: Ongoing, Progressing     Problem: Oral Intake Inadequate (Acute Kidney Injury/Impairment)  Goal: Optimal Nutrition Intake  Outcome: Ongoing, Progressing     Problem: Renal Function Impairment (Acute Kidney Injury/Impairment)  Goal: Effective Renal Function  Outcome: Ongoing, Progressing     Problem: Adult Inpatient Plan of Care  Goal: Plan of Care Review  Outcome: Ongoing, Progressing  Goal: Patient-Specific Goal (Individualized)  Outcome: Ongoing, Progressing  Goal: Absence of Hospital-Acquired Illness or Injury  Outcome: Ongoing, Progressing  Goal: Optimal Comfort and Wellbeing  Outcome: Ongoing, Progressing  Goal: Readiness for Transition of Care  Outcome: Ongoing, Progressing     Problem: Infection  Goal: Absence of Infection Signs and Symptoms  Outcome: Ongoing, Progressing     Problem: Fall Injury Risk  Goal: Absence of Fall and Fall-Related Injury  Outcome: Ongoing, Progressing     Problem: Skin Injury Risk Increased  Goal: Skin Health and Integrity  Outcome: Ongoing, Progressing

## 2022-10-27 NOTE — PROGRESS NOTES
[facility]  Colorectal Surgery  Progress Note    Patient Name: Javier Downs  MRN: [id number]  Admission Date: 10/20/2022  Hospital Length of Stay: 7 days  Attending Physician: EDILMA Bonilla MD    Subjective:     Interval History: No acute events overnight. States his pain over the right side of his abdomen had increased some. No nausea or vomiting. Tolerating a low residue diet. Passing flatus and having bowel movements.     Post-Op Info:  Procedure(s) (LRB):  HEMICOLECTOMY, RIGHT, extended (N/A)  OMENTECTOMY (N/A)   7 Days Post-Op      Medications:  Continuous Infusions:   dextrose 5 % and 0.45 % NaCl with KCl 20 mEq 75 mL/hr at 10/27/22 0226     Scheduled Meds:   alvimopan  12 mg Oral BID    amLODIPine  10 mg Oral Daily    enoxaparin  30 mg Subcutaneous Daily    methocarbamoL  500 mg Oral TID    tamsulosin  0.8 mg Oral Daily     PRN Meds:   HYDROmorphone    naloxone    ondansetron    oxyCODONE    traMADoL        Objective:     Vital Signs (Most Recent):  Temp: 98.2 °F (36.8 °C) (10/27/22 0547)  Pulse: 101 (10/27/22 0547)  Resp: 18 (10/27/22 0600)  BP: 135/83 (10/27/22 0547)  SpO2: 98 % (10/27/22 0547) Vital Signs (24h Range):  Temp:  [97.4 °F (36.3 °C)-98.9 °F (37.2 °C)] 98.2 °F (36.8 °C)  Pulse:  [] 101  Resp:  [18-20] 18  SpO2:  [93 %-98 %] 98 %  BP: (101-135)/(62-83) 135/83     Intake/Output - Last 3 Shifts         10/25 0700  10/26 0659 10/26 0700  10/27 0659    P.O.  850    I.V. (mL/kg)  8161.5 (170.4)    Total Intake(mL/kg)  9011.5 (188.1)    Urine (mL/kg/hr) 300 (0.3) 1200 (1)    Stool  1    Total Output 300 1201    Net -300 +7810.5          Urine Occurrence  3 x    Stool Occurrence 3 x 1 x            Physical Exam  Vitals and nursing note reviewed.   Cardiovascular:      Rate and Rhythm: Normal rate.   Pulmonary:      Effort: Pulmonary effort is normal. No respiratory distress.   Abdominal:      General: There is distension.      Tenderness: There is abdominal tenderness.      Comments:  Distension continues to improve, abdomen softer today. Right-sided tenderness still present. Midline incision healing well.          Significant Labs:  BMP:   Recent Labs   Lab 10/27/22  0235   GLU 95   *   K 3.9      CO2 21*   BUN 3*   CREATININE 0.6   CALCIUM 8.4*   MG 1.6     CBC:   Recent Labs   Lab 10/27/22  0235   WBC 11.08   RBC 3.17*   HGB 9.1*   HCT 28.9*   *   MCV 91   MCH 28.7   MCHC 31.5*     CRP (Last 3 Results):   Recent Labs   Lab 10/25/22  0405 10/26/22  0226 10/27/22  0235   .7* 107.2* 121.4*       Significant Diagnostics:  None    Assessment/Plan:     Colonic mass  Javier Martinez is a 72 y.o. M s/p extended right hemicolectomy 10/20     Plan:   - increased pain, mild tachycardia, and elevated CRP. Will back off on diet today and keep in house.   - continue mIVF, increase to 125  - continue PRN pain and nausea meds  - flomax    - PT/OT            Jose Groe MD  Colorectal Surgery  [facility]

## 2022-10-27 NOTE — NURSING
POC reviewed with patient. AAOX4. Ambulating in room and to bathroom. Zofran given for nausea.Diet changed to clear liquids as tolerated.VSS.Abdominal X-rays completed. Medicated for pain. WCTM

## 2022-10-27 NOTE — ASSESSMENT & PLAN NOTE
Javier Martinez is a 72 y.o. M s/p extended right hemicolectomy 10/20     Plan:   - increased pain, mild tachycardia, and elevated CRP. Will consider CT abdomen/pelvis with IV contrast today  - continue LRD  - continue mIVF  - continue PRN pain and nausea meds  - flomax    - PT/OT

## 2022-10-27 NOTE — SUBJECTIVE & OBJECTIVE
Subjective:     Interval History: No acute events overnight. States his pain over the right side of his abdomen had increased some. No nausea or vomiting. Tolerating a low residue diet. Passing flatus and having bowel movements.     Post-Op Info:  Procedure(s) (LRB):  HEMICOLECTOMY, RIGHT, extended (N/A)  OMENTECTOMY (N/A)   7 Days Post-Op      Medications:  Continuous Infusions:   dextrose 5 % and 0.45 % NaCl with KCl 20 mEq 75 mL/hr at 10/27/22 0226     Scheduled Meds:   alvimopan  12 mg Oral BID    amLODIPine  10 mg Oral Daily    enoxaparin  30 mg Subcutaneous Daily    methocarbamoL  500 mg Oral TID    tamsulosin  0.8 mg Oral Daily     PRN Meds:   HYDROmorphone    naloxone    ondansetron    oxyCODONE    traMADoL        Objective:     Vital Signs (Most Recent):  Temp: 98.2 °F (36.8 °C) (10/27/22 0547)  Pulse: 101 (10/27/22 0547)  Resp: 18 (10/27/22 0600)  BP: 135/83 (10/27/22 0547)  SpO2: 98 % (10/27/22 0547) Vital Signs (24h Range):  Temp:  [97.4 °F (36.3 °C)-98.9 °F (37.2 °C)] 98.2 °F (36.8 °C)  Pulse:  [] 101  Resp:  [18-20] 18  SpO2:  [93 %-98 %] 98 %  BP: (101-135)/(62-83) 135/83     Intake/Output - Last 3 Shifts         10/25 0700  10/26 0659 10/26 0700  10/27 0659    P.O.  850    I.V. (mL/kg)  8161.5 (170.4)    Total Intake(mL/kg)  9011.5 (188.1)    Urine (mL/kg/hr) 300 (0.3) 1200 (1)    Stool  1    Total Output 300 1201    Net -300 +7810.5          Urine Occurrence  3 x    Stool Occurrence 3 x 1 x            Physical Exam  Vitals and nursing note reviewed.   Cardiovascular:      Rate and Rhythm: Normal rate.   Pulmonary:      Effort: Pulmonary effort is normal. No respiratory distress.   Abdominal:      General: There is distension.      Tenderness: There is abdominal tenderness.      Comments: Distension continues to improve, abdomen softer today. Right-sided tenderness still present. Midline incision healing well.          Significant Labs:  BMP:   Recent Labs   Lab 10/27/22  0235   GLU 95   NA  134*   K 3.9      CO2 21*   BUN 3*   CREATININE 0.6   CALCIUM 8.4*   MG 1.6     CBC:   Recent Labs   Lab 10/27/22  0235   WBC 11.08   RBC 3.17*   HGB 9.1*   HCT 28.9*   *   MCV 91   MCH 28.7   MCHC 31.5*     CRP (Last 3 Results):   Recent Labs   Lab 10/25/22  0405 10/26/22  0226 10/27/22  0235   .7* 107.2* 121.4*       Significant Diagnostics:  None

## 2022-10-28 LAB
ALBUMIN SERPL BCP-MCNC: 1.9 G/DL (ref 3.5–5.2)
ALP SERPL-CCNC: 74 U/L (ref 55–135)
ALT SERPL W/O P-5'-P-CCNC: 13 U/L (ref 10–44)
ANION GAP SERPL CALC-SCNC: 8 MMOL/L (ref 8–16)
AST SERPL-CCNC: 31 U/L (ref 10–40)
BASOPHILS # BLD AUTO: 0.03 K/UL (ref 0–0.2)
BASOPHILS NFR BLD: 0.4 % (ref 0–1.9)
BILIRUB SERPL-MCNC: 0.5 MG/DL (ref 0.1–1)
BUN SERPL-MCNC: <3 MG/DL (ref 8–23)
CALCIUM SERPL-MCNC: 8.1 MG/DL (ref 8.7–10.5)
CHLORIDE SERPL-SCNC: 100 MMOL/L (ref 95–110)
CO2 SERPL-SCNC: 25 MMOL/L (ref 23–29)
CREAT SERPL-MCNC: 0.6 MG/DL (ref 0.5–1.4)
CRP SERPL-MCNC: 135.4 MG/L (ref 0–8.2)
DIFFERENTIAL METHOD: ABNORMAL
EOSINOPHIL # BLD AUTO: 0.1 K/UL (ref 0–0.5)
EOSINOPHIL NFR BLD: 1.1 % (ref 0–8)
ERYTHROCYTE [DISTWIDTH] IN BLOOD BY AUTOMATED COUNT: 15.5 % (ref 11.5–14.5)
EST. GFR  (NO RACE VARIABLE): >60 ML/MIN/1.73 M^2
GLUCOSE SERPL-MCNC: 107 MG/DL (ref 70–110)
HCT VFR BLD AUTO: 27.5 % (ref 40–54)
HGB BLD-MCNC: 8.7 G/DL (ref 14–18)
IMM GRANULOCYTES # BLD AUTO: 0.02 K/UL (ref 0–0.04)
IMM GRANULOCYTES NFR BLD AUTO: 0.3 % (ref 0–0.5)
LYMPHOCYTES # BLD AUTO: 1 K/UL (ref 1–4.8)
LYMPHOCYTES NFR BLD: 13.7 % (ref 18–48)
MAGNESIUM SERPL-MCNC: 1.5 MG/DL (ref 1.6–2.6)
MCH RBC QN AUTO: 28.8 PG (ref 27–31)
MCHC RBC AUTO-ENTMCNC: 31.6 G/DL (ref 32–36)
MCV RBC AUTO: 91 FL (ref 82–98)
MONOCYTES # BLD AUTO: 1.2 K/UL (ref 0.3–1)
MONOCYTES NFR BLD: 16.4 % (ref 4–15)
NEUTROPHILS # BLD AUTO: 5.1 K/UL (ref 1.8–7.7)
NEUTROPHILS NFR BLD: 68.1 % (ref 38–73)
NRBC BLD-RTO: 0 /100 WBC
PHOSPHATE SERPL-MCNC: 3 MG/DL (ref 2.7–4.5)
PLATELET # BLD AUTO: 450 K/UL (ref 150–450)
PMV BLD AUTO: 9.2 FL (ref 9.2–12.9)
POTASSIUM SERPL-SCNC: 4.3 MMOL/L (ref 3.5–5.1)
PROT SERPL-MCNC: 5.5 G/DL (ref 6–8.4)
RBC # BLD AUTO: 3.02 M/UL (ref 4.6–6.2)
SODIUM SERPL-SCNC: 133 MMOL/L (ref 136–145)
WBC # BLD AUTO: 7.52 K/UL (ref 3.9–12.7)

## 2022-10-28 PROCEDURE — C1751 CATH, INF, PER/CENT/MIDLINE: HCPCS

## 2022-10-28 PROCEDURE — B4185 PARENTERAL SOL 10 GM LIPIDS: HCPCS

## 2022-10-28 PROCEDURE — 84100 ASSAY OF PHOSPHORUS: CPT | Performed by: STUDENT IN AN ORGANIZED HEALTH CARE EDUCATION/TRAINING PROGRAM

## 2022-10-28 PROCEDURE — 63600175 PHARM REV CODE 636 W HCPCS: Performed by: STUDENT IN AN ORGANIZED HEALTH CARE EDUCATION/TRAINING PROGRAM

## 2022-10-28 PROCEDURE — 80053 COMPREHEN METABOLIC PANEL: CPT | Performed by: STUDENT IN AN ORGANIZED HEALTH CARE EDUCATION/TRAINING PROGRAM

## 2022-10-28 PROCEDURE — 36415 COLL VENOUS BLD VENIPUNCTURE: CPT | Performed by: STUDENT IN AN ORGANIZED HEALTH CARE EDUCATION/TRAINING PROGRAM

## 2022-10-28 PROCEDURE — 25000003 PHARM REV CODE 250: Performed by: STUDENT IN AN ORGANIZED HEALTH CARE EDUCATION/TRAINING PROGRAM

## 2022-10-28 PROCEDURE — 84478 ASSAY OF TRIGLYCERIDES: CPT | Performed by: STUDENT IN AN ORGANIZED HEALTH CARE EDUCATION/TRAINING PROGRAM

## 2022-10-28 PROCEDURE — 76937 US GUIDE VASCULAR ACCESS: CPT

## 2022-10-28 PROCEDURE — A4217 STERILE WATER/SALINE, 500 ML: HCPCS | Performed by: COLON & RECTAL SURGERY

## 2022-10-28 PROCEDURE — 85025 COMPLETE CBC W/AUTO DIFF WBC: CPT | Performed by: STUDENT IN AN ORGANIZED HEALTH CARE EDUCATION/TRAINING PROGRAM

## 2022-10-28 PROCEDURE — 36573 INSJ PICC RS&I 5 YR+: CPT

## 2022-10-28 PROCEDURE — 20600001 HC STEP DOWN PRIVATE ROOM

## 2022-10-28 PROCEDURE — 83735 ASSAY OF MAGNESIUM: CPT | Performed by: STUDENT IN AN ORGANIZED HEALTH CARE EDUCATION/TRAINING PROGRAM

## 2022-10-28 PROCEDURE — 25000003 PHARM REV CODE 250: Performed by: COLON & RECTAL SURGERY

## 2022-10-28 PROCEDURE — 63600175 PHARM REV CODE 636 W HCPCS: Performed by: COLON & RECTAL SURGERY

## 2022-10-28 PROCEDURE — 86140 C-REACTIVE PROTEIN: CPT | Performed by: STUDENT IN AN ORGANIZED HEALTH CARE EDUCATION/TRAINING PROGRAM

## 2022-10-28 PROCEDURE — 25000003 PHARM REV CODE 250

## 2022-10-28 RX ORDER — SODIUM CHLORIDE 0.9 % (FLUSH) 0.9 %
10 SYRINGE (ML) INJECTION
Status: DISCONTINUED | OUTPATIENT
Start: 2022-10-28 | End: 2022-11-02 | Stop reason: HOSPADM

## 2022-10-28 RX ORDER — SODIUM CHLORIDE 0.9 % (FLUSH) 0.9 %
10 SYRINGE (ML) INJECTION EVERY 6 HOURS
Status: DISCONTINUED | OUTPATIENT
Start: 2022-10-28 | End: 2022-11-02 | Stop reason: HOSPADM

## 2022-10-28 RX ADMIN — MAGNESIUM SULFATE HEPTAHYDRATE: 500 INJECTION, SOLUTION INTRAMUSCULAR; INTRAVENOUS at 10:10

## 2022-10-28 RX ADMIN — OXYCODONE 5 MG: 5 TABLET ORAL at 07:10

## 2022-10-28 RX ADMIN — METHOCARBAMOL 500 MG: 500 TABLET ORAL at 03:10

## 2022-10-28 RX ADMIN — DEXTROSE, SODIUM CHLORIDE, AND POTASSIUM CHLORIDE: 5; .45; .15 INJECTION INTRAVENOUS at 02:10

## 2022-10-28 RX ADMIN — OXYCODONE 5 MG: 5 TABLET ORAL at 01:10

## 2022-10-28 RX ADMIN — AMLODIPINE BESYLATE 10 MG: 10 TABLET ORAL at 08:10

## 2022-10-28 RX ADMIN — METHOCARBAMOL 500 MG: 500 TABLET ORAL at 08:10

## 2022-10-28 RX ADMIN — ENOXAPARIN SODIUM 30 MG: 30 INJECTION SUBCUTANEOUS at 04:10

## 2022-10-28 RX ADMIN — TRAMADOL HYDROCHLORIDE 50 MG: 50 TABLET, COATED ORAL at 08:10

## 2022-10-28 RX ADMIN — OXYCODONE 5 MG: 5 TABLET ORAL at 08:10

## 2022-10-28 RX ADMIN — OXYCODONE 5 MG: 5 TABLET ORAL at 02:10

## 2022-10-28 RX ADMIN — TAMSULOSIN HYDROCHLORIDE 0.8 MG: 0.4 CAPSULE ORAL at 08:10

## 2022-10-28 RX ADMIN — I.V. FAT EMULSION 250 ML: 20 EMULSION INTRAVENOUS at 10:10

## 2022-10-28 NOTE — CONSULTS
[facility]  Adult Nutrition  Consult Note    SUMMARY     Recommendations  1. Increase custom TPN: 204 g D, 81 g AA+ IV lipids (GIR: 2.96)- this provides 1517 kcals, 81 g of protein   2. Closely monitor for nutrition related- labs and adjust TPN prn   3. If TF recs warranted, rec'd Yesenia Thomas 1.5 @ 45 ml/hr to provide 1661 kcals, 80 g of protein, and 756 ml of fluid.   4. Advancement of diet per Speech recommendations  5. RD following    Goals: Meet % of EEN/EPN by RD f/u date  Nutrition Goal Status: goal not met  Communication of RD Recs: other (POC)    Assessment and Plan    Nutrition Problem:  Severe Protein-Calorie Malnutrition  Malnutrition in the context of Chronic Illness/Injury    Related to (etiology):  Decreased ability to consume sufficient energy intake    Signs and Symptoms (as evidenced by):  Energy Intake: <75% of estimated energy requirement for >1 month  Body Fat Depletion: severe depletion of triceps and thoracic and lumbar region   Muscle Mass Depletion: severe depletion of clavicle region and lower extremities   Weight Loss: 13% x 1 month    Interventions(treatment strategy):  Collaboration of nutrition care w/ other providers     Nutrition Diagnosis Status:  New     Reason for Assessment    Reason For Assessment: consult  Diagnosis: other (see comments)  Relevant Medical History: HTN, MARIA A  Interdisciplinary Rounds: did not attend  General Information Comments: RD consulted for TPN recommendations, Noted pt is currently on a clear liquid diet. Unsure of nutrition hx PTA. Noted 13% significant weight loss x 1 month. TPN currently ordered provides 151 g D + 50 g AA + IV lipids- not meeting pt's needs. TPN recommendations provided in POC.Pt meets criteria for severe protein malnutrition in the context of chronic illness per ASPEN guidelines at this time. LBM noted-10/28/22  Nutrition Discharge Planning: pending clinical course    Nutrition Risk Screen    Nutrition Risk Screen: no  "indicators present    Nutrition/Diet History    Spiritual, Cultural Beliefs, Yazidism Practices, Values that Affect Care: no  Food Allergies: NKFA    Anthropometrics    Temp: 98 °F (36.7 °C)  Height: 5' 10" (177.8 cm)  Height (inches): 70 in  Weight Method: Standard Scale  Weight: 47.9 kg (105 lb 9.6 oz)  Weight (lb): 105.6 lb  Ideal Body Weight (IBW), Male: 166 lb  % Ideal Body Weight, Male (lb): 63.61 %  BMI (Calculated): 15.2  BMI Grade: less than 16 protein-energy malnutrition grade III       Lab/Procedures/Meds    Pertinent Labs Reviewed: reviewed  Pertinent Labs Comments: , Sodium 133  Pertinent Medications Reviewed: reviewed  Pertinent Medications Comments: TPN, IV lipids    Estimated/Assessed Needs    Weight Used For Calorie Calculations: 47.9 kg (105 lb 9.6 oz)  Energy Calorie Requirements (kcal): 0906-0279  Energy Need Method: Kcal/kg (30-35 kcal/kg)  Protein Requirements: 81 g (1.7 g/kg)  Weight Used For Protein Calculations: 47.9 kg (105 lb 9.6 oz)   RDA Method (mL): 1437     Nutrition Prescription Ordered    Current Diet Order: clear liquid diet    Evaluation of Received Nutrient/Fluid Intake    I/O: + 6.6 L since admit  Energy Calories Required: not meeting needs  Protein Required: not meeting needs  Fluid Required: not meeting needs  Total Fluid Intake (mL/kg): 1 ml or fluid per MD  Tolerance: tolerating  % Intake of Estimated Energy Needs: 0 - 25 %  % Meal Intake: 0 - 25 %    Nutrition Risk    Level of Risk/Frequency of Follow-up: low ((1 x/week))       Monitor and Evaluation    Food and Nutrient Intake: energy intake, food and beverage intake, enteral nutrition intake, parenteral nutrition intake  Food and Nutrient Adminstration: diet order  Knowledge/Beliefs/Attitudes: food and nutrition knowledge/skill, beliefs and attitudes  Physical Activity and Function: nutrition-related ADLs and IADLs, factors affecting access to physical activity  Anthropometric Measurements: height/length, weight " change, weight, body mass index, growth pattern indices/percentile ranks  Biochemical Data, Medical Tests and Procedures: electrolyte and renal panel, gastrointestinal profile, glucose/endocrine profile, lipid profile, inflammatory profile  Nutrition-Focused Physical Findings: overall appearance, extremities, muscles and bones, head and eyes, skin       Nutrition Follow-Up    RD Follow-up?: Yes

## 2022-10-28 NOTE — PLAN OF CARE
[facility]  Discharge Reassessment    Primary Care Provider: Renetta García MD    Expected Discharge Date: 10/29/2022    Reassessment (most recent)       Discharge Reassessment - 10/28/22 1143          Discharge Reassessment    Assessment Type Discharge Planning Reassessment     Discharge Plan A Home     Discharge Plan B Home Health     DME Needed Upon Discharge  none     Discharge Barriers Identified None     Why the patient remains in the hospital Requires continued medical care                   Azeb Aburto RN, CM   Ext: [phone]

## 2022-10-28 NOTE — PLAN OF CARE
Pt aaox4. No s/s of distress. No complaints aside from abdominal discomfort relieved by PRN meds. VSS. Bed in lowest position. Call light within reach. Pt had a BM on toilet tonight and was assisted by wife to give himself a bath. Pt on room air. Pt on IVF@125cc/hr. Good urine output tonight. NAEON.      Problem: Fluid and Electrolyte Imbalance (Acute Kidney Injury/Impairment)  Goal: Fluid and Electrolyte Balance  Outcome: Ongoing, Progressing     Problem: Oral Intake Inadequate (Acute Kidney Injury/Impairment)  Goal: Optimal Nutrition Intake  Outcome: Ongoing, Progressing     Problem: Renal Function Impairment (Acute Kidney Injury/Impairment)  Goal: Effective Renal Function  Outcome: Ongoing, Progressing     Problem: Adult Inpatient Plan of Care  Goal: Plan of Care Review  Outcome: Ongoing, Progressing  Goal: Patient-Specific Goal (Individualized)  Outcome: Ongoing, Progressing  Goal: Absence of Hospital-Acquired Illness or Injury  Outcome: Ongoing, Progressing  Goal: Optimal Comfort and Wellbeing  Outcome: Ongoing, Progressing  Goal: Readiness for Transition of Care  Outcome: Ongoing, Progressing     Problem: Infection  Goal: Absence of Infection Signs and Symptoms  Outcome: Ongoing, Progressing     Problem: Fall Injury Risk  Goal: Absence of Fall and Fall-Related Injury  Outcome: Ongoing, Progressing     Problem: Skin Injury Risk Increased  Goal: Skin Health and Integrity  Outcome: Ongoing, Progressing

## 2022-10-28 NOTE — PROCEDURES
"Javier Downs is a 72 y.o. male patient.    Temp: 98.2 °F (36.8 °C) (10/28/22 0713)  Pulse: 91 (10/28/22 0713)  Resp: 18 (10/28/22 0713)  BP: 115/73 (10/28/22 0713)  SpO2: 95 % (10/28/22 0713)  Weight: 47.9 kg (105 lb 9.6 oz) (10/22/22 1204)  Height: 5' 10" (177.8 cm) (10/22/22 1204)    PICC  Date/Time: 10/28/2022 10:20 AM  Performed by: Sendy Burris RN  Assisting provider: Alexander Dillon RN  Consent Done: Yes  Time out: Immediately prior to procedure a time out was called to verify the correct patient, procedure, equipment, support staff and site/side marked as required  Indications: med administration and vascular access  Anesthesia: local infiltration  Local anesthetic: lidocaine 1% without epinephrine  Anesthetic Total (mL): 3  Description of findings: PICC  Preparation: skin prepped with ChloraPrep  Skin prep agent dried: skin prep agent completely dried prior to procedure  Sterile barriers: all five maximum sterile barriers used - cap, mask, sterile gown, sterile gloves, and large sterile sheet  Hand hygiene: hand hygiene performed prior to central venous catheter insertion  Location details: right brachial  Catheter type: double lumen  Catheter size: 5 Fr  Catheter Length: 39cm    Ultrasound guidance: yes  Vessel Caliber: medium and patent, compressibility normal  Vascular Doppler: not done  Needle advanced into vessel with real time Ultrasound guidance.  Guidewire confirmed in vessel.  Image recorded and saved.  Sterile sheath used.  Number of attempts: 1  Post-procedure: blood return through all ports, chlorhexidine patch and sterile dressing applied  Technical procedures used: 3CG  Specimens: No  Implants: No  Assessment: placement verified by x-ray  Complications: none        Name   10/28/2022    "

## 2022-10-28 NOTE — PROGRESS NOTES
[facility]  Colorectal Surgery  Progress Note    Patient Name: Javier Downs  MRN: [id number]  Admission Date: 10/20/2022  Hospital Length of Stay: 8 days  Attending Physician: EDILMA Bonilla MD    Subjective:     Interval History: NAEO. VSS. Patient reports doing well overnight, but still complaining of RLQ abdominal pain. No nausea or vomiting noted and he continues to pass gas and have bowel movements. Patient states he feels ready to trial clears today.     Post-Op Info:  Procedure(s) (LRB):  HEMICOLECTOMY, RIGHT, extended (N/A)  OMENTECTOMY (N/A)   8 Days Post-Op      Medications:  Continuous Infusions:   dextrose 5 % and 0.45 % NaCl with KCl 20 mEq 125 mL/hr at 10/28/22 0203     Scheduled Meds:   amLODIPine  10 mg Oral Daily    enoxaparin  30 mg Subcutaneous Daily    methocarbamoL  500 mg Oral TID    tamsulosin  0.8 mg Oral Daily     PRN Meds:   HYDROmorphone    naloxone    oxyCODONE    traMADoL        Objective:     Vital Signs (Most Recent):  Temp: 98.2 °F (36.8 °C) (10/28/22 0713)  Pulse: 91 (10/28/22 0713)  Resp: 18 (10/28/22 0713)  BP: 115/73 (10/28/22 0713)  SpO2: 95 % (10/28/22 0713) Vital Signs (24h Range):  Temp:  [97 °F (36.1 °C)-98.4 °F (36.9 °C)] 98.2 °F (36.8 °C)  Pulse:  [] 91  Resp:  [17-20] 18  SpO2:  [95 %-98 %] 95 %  BP: (108-130)/(68-77) 115/73     Intake/Output - Last 3 Shifts         10/26 0700  10/27 0659 10/27 0700  10/28 0659 10/28 0700  10/29 0659    P.O. 850 300     I.V. (mL/kg) 8161.5 (170.4) 1568 (32.7)     Total Intake(mL/kg) 9011.5 (188.1) 1868 (39)     Urine (mL/kg/hr) 1200 (1) 1250 (1.1)     Stool 1 2     Total Output 1201 1252     Net +7810.5 +616            Urine Occurrence 3 x 1 x     Stool Occurrence 1 x 4 x             Physical Exam  Vitals and nursing note reviewed.   Cardiovascular:      Rate and Rhythm: Normal rate.   Pulmonary:      Effort: Pulmonary effort is normal. No respiratory distress.   Abdominal:      General: There is distension.      Tenderness:  "There is abdominal tenderness.      Comments: Distension continues to improve, abdomen softer today. Right-sided tenderness to palpation still present. Midline incision healing well.        Significant Labs:  BMP (Last 3 Results):   Recent Labs   Lab 10/26/22  0226 10/27/22  0235 10/28/22  0502    95 107   * 134* 133*   K 4.3 3.9 4.3    102 100   CO2 24 21* 25   BUN 3* 3* <3*   CREATININE 0.7 0.6 0.6   CALCIUM 8.1* 8.4* 8.1*   MG 1.8 1.6 1.5*     CBC (Last 3 Results):   Recent Labs   Lab 10/26/22  0226 10/27/22  0235 10/28/22  0502   WBC 8.89 11.08 7.52   RBC 3.16* 3.17* 3.02*   HGB 9.0* 9.1* 8.7*   HCT 27.6* 28.9* 27.5*    482* 450   MCV 87 91 91   MCH 28.5 28.7 28.8   MCHC 32.6 31.5* 31.6*       Significant Diagnostics:  I have reviewed all pertinent imaging results/findings within the past 24 hours.    Assessment/Plan:     Colonic mass  Javier Martinez is a 72 y.o. M s/p extended right hemicolectomy 10/20. Patient has return of bowel function. Persistent RLQ pain with uptrending CRP, suspicious for anastomotic leak vs abscess - may require CT scan. Pain is otherwise well controlled and incisions are clean, dry, and intact.      Plan:   - increased pain, mild tachycardia, and elevated CRP. Will consider CT abdomen/pelvis with IV contrast today  - advance to CLD  - continue mIVF  - continue PRN pain and nausea meds  - flomax    - PT/OT        Blanka Horvath MD  Colorectal Surgery  [facility]      I have personally obtained a history and performed a physical exam with and independent to my resident and discussed the findings and plan with the patient.    POD 7  Pt reports intermittent pain, gas or cramps, generally before he has to have a BM.  No N/V.  Up walking without any increase in pain.      /73 (BP Location: Left arm)   Pulse 91   Temp 98.2 °F (36.8 °C) (Oral)   Resp 18   Ht 5' 10" (1.778 m)   Wt 47.9 kg (105 lb 9.6 oz)   SpO2 95%   BMI 15.15 kg/m²   Pt in NAD  Incision " cdi.  Minimal swelling at inferior aspect of incision without fluctuance or d/c.    Abdomen - mild distension, soft, no shake or percussion tenderness.    Discomfort to palpation.    Assessment  No signs of sepsis or peritonitis on exam.  No clinical indication of anastomotic leak.  Suspect ileus versus partial SBO causing intermittent crampy abd pain which precedes BM  Wound healing primarily  Good performance status  Malnutrition  Stage IV colon CA with hepatic mets    Plan  1.  Check plain films.  2.  Start TPN  3.  Serial exam.  May need NGT      HJared MD, FACS, FASCRS  Staff Surgeon  Dept of Colon and Rectal Surgery  [facility] [place], [place]

## 2022-10-28 NOTE — PLAN OF CARE
Recommendations  1. Increase custom TPN: 204 g D, 81 g AA+ IV lipids (GIR: 2.96)- this provides 1517 kcals, 81 g of protein   2. Closely monitor for nutrition related- labs and adjust TPN prn   3. If TF recs warranted, rec'd [employer] 1.5 @ 45 ml/hr to provide 1661 kcals, 80 g of protein, and 756 ml of fluid.   4. Advancement of diet per Speech recommendations  5. RD following     Goals: Meet % of EEN/EPN by RD f/u date  Nutrition Goal Status: goal not met  Communication of RD Recs: other (POC)

## 2022-10-28 NOTE — SUBJECTIVE & OBJECTIVE
Subjective:     Interval History: NAEO. VSS. Patient reports doing well overnight, but still complaining of RLQ abdominal pain. No nausea or vomiting noted and he continues to pass gas and have bowel movements. Patient states he feels ready to trial clears today.     Post-Op Info:  Procedure(s) (LRB):  HEMICOLECTOMY, RIGHT, extended (N/A)  OMENTECTOMY (N/A)   8 Days Post-Op      Medications:  Continuous Infusions:   dextrose 5 % and 0.45 % NaCl with KCl 20 mEq 125 mL/hr at 10/28/22 0203     Scheduled Meds:   amLODIPine  10 mg Oral Daily    enoxaparin  30 mg Subcutaneous Daily    methocarbamoL  500 mg Oral TID    tamsulosin  0.8 mg Oral Daily     PRN Meds:   HYDROmorphone    naloxone    oxyCODONE    traMADoL        Objective:     Vital Signs (Most Recent):  Temp: 98.2 °F (36.8 °C) (10/28/22 0713)  Pulse: 91 (10/28/22 0713)  Resp: 18 (10/28/22 0713)  BP: 115/73 (10/28/22 0713)  SpO2: 95 % (10/28/22 0713) Vital Signs (24h Range):  Temp:  [97 °F (36.1 °C)-98.4 °F (36.9 °C)] 98.2 °F (36.8 °C)  Pulse:  [] 91  Resp:  [17-20] 18  SpO2:  [95 %-98 %] 95 %  BP: (108-130)/(68-77) 115/73     Intake/Output - Last 3 Shifts         10/26 0700  10/27 0659 10/27 0700  10/28 0659 10/28 0700  10/29 0659    P.O. 850 300     I.V. (mL/kg) 8161.5 (170.4) 1568 (32.7)     Total Intake(mL/kg) 9011.5 (188.1) 1868 (39)     Urine (mL/kg/hr) 1200 (1) 1250 (1.1)     Stool 1 2     Total Output 1201 1252     Net +7810.5 +616            Urine Occurrence 3 x 1 x     Stool Occurrence 1 x 4 x             Physical Exam  Vitals and nursing note reviewed.   Cardiovascular:      Rate and Rhythm: Normal rate.   Pulmonary:      Effort: Pulmonary effort is normal. No respiratory distress.   Abdominal:      General: There is distension.      Tenderness: There is abdominal tenderness.      Comments: Distension continues to improve, abdomen softer today. Right-sided tenderness to palpation still present. Midline incision healing well.        Significant  Labs:  BMP (Last 3 Results):   Recent Labs   Lab 10/26/22  0226 10/27/22  0235 10/28/22  0502    95 107   * 134* 133*   K 4.3 3.9 4.3    102 100   CO2 24 21* 25   BUN 3* 3* <3*   CREATININE 0.7 0.6 0.6   CALCIUM 8.1* 8.4* 8.1*   MG 1.8 1.6 1.5*     CBC (Last 3 Results):   Recent Labs   Lab 10/26/22  0226 10/27/22  0235 10/28/22  0502   WBC 8.89 11.08 7.52   RBC 3.16* 3.17* 3.02*   HGB 9.0* 9.1* 8.7*   HCT 27.6* 28.9* 27.5*    482* 450   MCV 87 91 91   MCH 28.5 28.7 28.8   MCHC 32.6 31.5* 31.6*       Significant Diagnostics:  I have reviewed all pertinent imaging results/findings within the past 24 hours.

## 2022-10-28 NOTE — ASSESSMENT & PLAN NOTE
Javier Martinez is a 72 y.o. M s/p extended right hemicolectomy 10/20. Patient has return of bowel function. Persistent RLQ pain with uptrending CRP, suspicious for anastomotic leak vs abscess - may require CT scan. Pain is otherwise well controlled and incisions are clean, dry, and intact.      Plan:   - increased pain, mild tachycardia, and elevated CRP. Will consider CT abdomen/pelvis with IV contrast today  - advance to CLD  - continue mIVF  - continue PRN pain and nausea meds  - flomax    - PT/OT

## 2022-10-28 NOTE — CONSULTS
Double lumen PICC to right brachial vein.  39 cm in length, 24 cm arm circumference and 0 cm exposed.   Lot # [id number].

## 2022-10-29 LAB
ALBUMIN SERPL BCP-MCNC: 1.9 G/DL (ref 3.5–5.2)
ALP SERPL-CCNC: 72 U/L (ref 55–135)
ALT SERPL W/O P-5'-P-CCNC: 11 U/L (ref 10–44)
ANION GAP SERPL CALC-SCNC: 10 MMOL/L (ref 8–16)
AST SERPL-CCNC: 26 U/L (ref 10–40)
BASOPHILS # BLD AUTO: 0.03 K/UL (ref 0–0.2)
BASOPHILS NFR BLD: 0.5 % (ref 0–1.9)
BILIRUB SERPL-MCNC: 0.4 MG/DL (ref 0.1–1)
BUN SERPL-MCNC: <3 MG/DL (ref 8–23)
CALCIUM SERPL-MCNC: 8.3 MG/DL (ref 8.7–10.5)
CHLORIDE SERPL-SCNC: 99 MMOL/L (ref 95–110)
CO2 SERPL-SCNC: 26 MMOL/L (ref 23–29)
CREAT SERPL-MCNC: 0.6 MG/DL (ref 0.5–1.4)
CRP SERPL-MCNC: 129.3 MG/L (ref 0–8.2)
DIFFERENTIAL METHOD: ABNORMAL
EOSINOPHIL # BLD AUTO: 0.2 K/UL (ref 0–0.5)
EOSINOPHIL NFR BLD: 3.7 % (ref 0–8)
ERYTHROCYTE [DISTWIDTH] IN BLOOD BY AUTOMATED COUNT: 15.6 % (ref 11.5–14.5)
EST. GFR  (NO RACE VARIABLE): >60 ML/MIN/1.73 M^2
GLUCOSE SERPL-MCNC: 97 MG/DL (ref 70–110)
HCT VFR BLD AUTO: 25.9 % (ref 40–54)
HGB BLD-MCNC: 8.1 G/DL (ref 14–18)
IMM GRANULOCYTES # BLD AUTO: 0.02 K/UL (ref 0–0.04)
IMM GRANULOCYTES NFR BLD AUTO: 0.3 % (ref 0–0.5)
LYMPHOCYTES # BLD AUTO: 1 K/UL (ref 1–4.8)
LYMPHOCYTES NFR BLD: 15.7 % (ref 18–48)
MAGNESIUM SERPL-MCNC: 1.7 MG/DL (ref 1.6–2.6)
MCH RBC QN AUTO: 28.8 PG (ref 27–31)
MCHC RBC AUTO-ENTMCNC: 31.3 G/DL (ref 32–36)
MCV RBC AUTO: 92 FL (ref 82–98)
MONOCYTES # BLD AUTO: 1.1 K/UL (ref 0.3–1)
MONOCYTES NFR BLD: 16.9 % (ref 4–15)
NEUTROPHILS # BLD AUTO: 4.1 K/UL (ref 1.8–7.7)
NEUTROPHILS NFR BLD: 62.9 % (ref 38–73)
NRBC BLD-RTO: 0 /100 WBC
PHOSPHATE SERPL-MCNC: 3.2 MG/DL (ref 2.7–4.5)
PLATELET # BLD AUTO: 419 K/UL (ref 150–450)
PMV BLD AUTO: 9.1 FL (ref 9.2–12.9)
POTASSIUM SERPL-SCNC: 3.8 MMOL/L (ref 3.5–5.1)
PROT SERPL-MCNC: 5.4 G/DL (ref 6–8.4)
RBC # BLD AUTO: 2.81 M/UL (ref 4.6–6.2)
SODIUM SERPL-SCNC: 135 MMOL/L (ref 136–145)
TRIGL SERPL-MCNC: 76 MG/DL (ref 30–150)
TRIGL SERPL-MCNC: 94 MG/DL (ref 30–150)
WBC # BLD AUTO: 6.49 K/UL (ref 3.9–12.7)

## 2022-10-29 PROCEDURE — 20600001 HC STEP DOWN PRIVATE ROOM

## 2022-10-29 PROCEDURE — 25000003 PHARM REV CODE 250: Performed by: STUDENT IN AN ORGANIZED HEALTH CARE EDUCATION/TRAINING PROGRAM

## 2022-10-29 PROCEDURE — B4185 PARENTERAL SOL 10 GM LIPIDS: HCPCS | Performed by: COLON & RECTAL SURGERY

## 2022-10-29 PROCEDURE — 84478 ASSAY OF TRIGLYCERIDES: CPT | Performed by: COLON & RECTAL SURGERY

## 2022-10-29 PROCEDURE — 80053 COMPREHEN METABOLIC PANEL: CPT | Performed by: STUDENT IN AN ORGANIZED HEALTH CARE EDUCATION/TRAINING PROGRAM

## 2022-10-29 PROCEDURE — 86140 C-REACTIVE PROTEIN: CPT | Performed by: STUDENT IN AN ORGANIZED HEALTH CARE EDUCATION/TRAINING PROGRAM

## 2022-10-29 PROCEDURE — 25000003 PHARM REV CODE 250

## 2022-10-29 PROCEDURE — 84100 ASSAY OF PHOSPHORUS: CPT | Performed by: STUDENT IN AN ORGANIZED HEALTH CARE EDUCATION/TRAINING PROGRAM

## 2022-10-29 PROCEDURE — 25000003 PHARM REV CODE 250: Performed by: COLON & RECTAL SURGERY

## 2022-10-29 PROCEDURE — A4217 STERILE WATER/SALINE, 500 ML: HCPCS | Performed by: COLON & RECTAL SURGERY

## 2022-10-29 PROCEDURE — 63600175 PHARM REV CODE 636 W HCPCS: Performed by: COLON & RECTAL SURGERY

## 2022-10-29 PROCEDURE — 83735 ASSAY OF MAGNESIUM: CPT | Performed by: STUDENT IN AN ORGANIZED HEALTH CARE EDUCATION/TRAINING PROGRAM

## 2022-10-29 PROCEDURE — 63600175 PHARM REV CODE 636 W HCPCS: Performed by: STUDENT IN AN ORGANIZED HEALTH CARE EDUCATION/TRAINING PROGRAM

## 2022-10-29 PROCEDURE — 85025 COMPLETE CBC W/AUTO DIFF WBC: CPT | Performed by: STUDENT IN AN ORGANIZED HEALTH CARE EDUCATION/TRAINING PROGRAM

## 2022-10-29 PROCEDURE — A4216 STERILE WATER/SALINE, 10 ML: HCPCS | Performed by: COLON & RECTAL SURGERY

## 2022-10-29 RX ADMIN — METHOCARBAMOL 500 MG: 500 TABLET ORAL at 08:10

## 2022-10-29 RX ADMIN — MAGNESIUM SULFATE HEPTAHYDRATE: 500 INJECTION, SOLUTION INTRAMUSCULAR; INTRAVENOUS at 09:10

## 2022-10-29 RX ADMIN — I.V. FAT EMULSION 250 ML: 20 EMULSION INTRAVENOUS at 09:10

## 2022-10-29 RX ADMIN — AMLODIPINE BESYLATE 10 MG: 10 TABLET ORAL at 08:10

## 2022-10-29 RX ADMIN — ENOXAPARIN SODIUM 30 MG: 30 INJECTION SUBCUTANEOUS at 04:10

## 2022-10-29 RX ADMIN — Medication 10 ML: at 06:10

## 2022-10-29 RX ADMIN — DEXTROSE, SODIUM CHLORIDE, AND POTASSIUM CHLORIDE: 5; .45; .15 INJECTION INTRAVENOUS at 04:10

## 2022-10-29 RX ADMIN — METHOCARBAMOL 500 MG: 500 TABLET ORAL at 03:10

## 2022-10-29 RX ADMIN — TAMSULOSIN HYDROCHLORIDE 0.8 MG: 0.4 CAPSULE ORAL at 08:10

## 2022-10-29 RX ADMIN — Medication 10 ML: at 12:10

## 2022-10-29 NOTE — PLAN OF CARE
Plan of care reviewed with pt:  -AAOx4, on RA, VS stable  -ABD incision with DB CDI VASHTI  -Had a BM with moderate amount brown form stool  -No nausea or emesis, advanced to full liquid diet,consumed about 50% of his meals,   has been drinking Boost  -Voided with adequate amount yellow urine  -ambulated in the room and hallway independently  -Denied pain  -Call light in reach, WCTM

## 2022-10-29 NOTE — NURSING
Pt's TPN and lipids were started tonight. Pt tolerates it well. An attempt to reach MD on call was made to see if they wanted to decrease the rate of house fluids. WCTM.    6/3/21, 11:46 AM EDT  DISCHARGE PLANNING EVALUATION:    Kristyn Thacker       Admitted: 6/2/2021/ [address] day: 1   Location: [address] [place] [address] Reason for admit: Acute hypoxemic respiratory failure ([facility] [J96.01]   PMH:  has a past medical history of AAA (abdominal aortic aneurysm) ([facility] CAD (coronary artery disease), Chronic kidney disease, COPD (chronic obstructive pulmonary disease) ([facility] Hypercholesteremia, Hypertension, Other disorders of kidney and ureter in diseases classified elsewhere, Pneumonia, Prostate cancer ([facility] SOB (shortness of breath), and Type II or unspecified type diabetes mellitus without mention of complication, not stated as uncontrolled. Procedure:   Barriers to Discharge:  Respiratory Failure/Fall w possible T12-L1 fracture/COPD w history AAA, COPD, CKD, prostate cancer. Pain Management following. HF 45% FIO2/45L continued; oxygen needs increased, baseline 6L oxygen; collaborated w Rigo Ariza RCP  PCP: DANTE Huston CNP  Readmission Risk Score: 16%    Patient Goals/Plan/Treatment Preferences: plans home w spouse Zara; has [address] home oxygen 6L, nebulizer, WC, walker; [facility] in past; await therapy recommendations; monitor for home oxygen eval if not weaned to baseline 6L; will ask Attending  Transportation/Food Security/Housekeeping Addressed:  No issues identified.

## 2022-10-29 NOTE — SUBJECTIVE & OBJECTIVE
Subjective:     Interval History: NAEO.  Denies n/v/bloating.  Not passing gas or having any bowel movements.  Ambulating well.      Post-Op Info:  Procedure(s) (LRB):  HEMICOLECTOMY, RIGHT, extended (N/A)  OMENTECTOMY (N/A)   9 Days Post-Op      Medications:  Continuous Infusions:   dextrose 5 % and 0.45 % NaCl with KCl 20 mEq 50 mL/hr at 10/29/22 0426    Standard Custom Day One ADULT TPN for patient with NO electrolyte abnormality and NO renal dysfunction (CENTRAL)      TPN ADULT CENTRAL LINE CUSTOM 42 mL/hr at 10/28/22 2211     Scheduled Meds:   amLODIPine  10 mg Oral Daily    enoxaparin  30 mg Subcutaneous Daily    methocarbamoL  500 mg Oral TID    sodium chloride 0.9%  10 mL Intravenous Q6H    tamsulosin  0.8 mg Oral Daily     PRN Meds:   HYDROmorphone    naloxone    oxyCODONE    sodium chloride 0.9%    traMADoL        Objective:     Vital Signs (Most Recent):  Temp: 98.2 °F (36.8 °C) (10/29/22 0734)  Pulse: (!) 111 (10/29/22 0734)  Resp: 16 (10/29/22 0734)  BP: 134/80 (10/29/22 0734)  SpO2: 99 % (10/29/22 0734)   Vital Signs (24h Range):  Temp:  [97.1 °F (36.2 °C)-99.3 °F (37.4 °C)] 98.2 °F (36.8 °C)  Pulse:  [] 111  Resp:  [16-18] 16  SpO2:  [95 %-99 %] 99 %  BP: (101-144)/(61-80) 134/80     Intake/Output - Last 3 Shifts         10/27 0700  10/28 0659 10/28 0700  10/29 0659 10/29 0700  10/30 0659    P.O. 300 780     I.V. (mL/kg) 1568 (32.7) 3576.5 (74.7)     TPN  421.7     Total Intake(mL/kg) 1868 (39) 4778.2 (99.8)     Urine (mL/kg/hr) 1250 (1.1) 1650 (1.4)     Stool 2 0     Total Output 1252 1650     Net +616 +3128.2            Urine Occurrence 1 x      Stool Occurrence 4 x 2 x             Physical Exam  Vitals and nursing note reviewed.   Cardiovascular:      Rate and Rhythm: Normal rate.   Pulmonary:      Effort: Pulmonary effort is normal. No respiratory distress.   Abdominal:      General: There is distension.      Tenderness: There is abdominal tenderness.      Comments: Distension continues to  improve, abdomen softer today. Right-sided tenderness to palpation still present. Midline incision healing well.        Significant Labs:  CBC (Last 3 Results):   Recent Labs   Lab 10/27/22  0235 10/28/22  0502 10/29/22  0520   WBC 11.08 7.52 6.49   RBC 3.17* 3.02* 2.81*   HGB 9.1* 8.7* 8.1*   HCT 28.9* 27.5* 25.9*   * 450 419   MCV 91 91 92   MCH 28.7 28.8 28.8   MCHC 31.5* 31.6* 31.3*     CMP (Last 3 Results):   Recent Labs   Lab 10/27/22  0235 10/28/22  0502 10/29/22  0523   GLU 95 107 97   CALCIUM 8.4* 8.1* 8.3*   ALBUMIN 1.9* 1.9* 1.9*   PROT 5.4* 5.5* 5.4*   * 133* 135*   K 3.9 4.3 3.8   CO2 21* 25 26    100 99   BUN 3* <3* <3*   CREATININE 0.6 0.6 0.6   ALKPHOS 77 74 72   ALT 14 13 11   AST 38 31 26   BILITOT 0.5 0.5 0.4     CRP (Last 3 Results):   Recent Labs   Lab 10/27/22  0235 10/28/22  0502 10/29/22  0520   .4* 135.4* 129.3*     All pertinent lab results within the last 24 hours have been reviewed.     Significant Diagnostics:  I have reviewed all pertinent imaging results/findings within the past 24 hours.

## 2022-10-29 NOTE — NURSING
I talked with the intern on call about the rate of house fluids post start of TPN, and he gave a verbal order to decrease the rate to 50 cc/hr. WCTM.

## 2022-10-29 NOTE — PROGRESS NOTES
[facility]  Colorectal Surgery  Progress Note    Patient Name: Javier Downs  MRN: [id number]  Admission Date: 10/20/2022  Hospital Length of Stay: 9 days  Attending Physician: EDILMA Bonilla MD    Subjective:     Interval History: NAEO.  Denies n/v/bloating.  Not passing gas or having any bowel movements.  Ambulating well.      Post-Op Info:  Procedure(s) (LRB):  HEMICOLECTOMY, RIGHT, extended (N/A)  OMENTECTOMY (N/A)   9 Days Post-Op      Medications:  Continuous Infusions:   dextrose 5 % and 0.45 % NaCl with KCl 20 mEq 50 mL/hr at 10/29/22 0426    Standard Custom Day One ADULT TPN for patient with NO electrolyte abnormality and NO renal dysfunction (CENTRAL)      TPN ADULT CENTRAL LINE CUSTOM 42 mL/hr at 10/28/22 2211     Scheduled Meds:   amLODIPine  10 mg Oral Daily    enoxaparin  30 mg Subcutaneous Daily    methocarbamoL  500 mg Oral TID    sodium chloride 0.9%  10 mL Intravenous Q6H    tamsulosin  0.8 mg Oral Daily     PRN Meds:   HYDROmorphone    naloxone    oxyCODONE    sodium chloride 0.9%    traMADoL        Objective:     Vital Signs (Most Recent):  Temp: 98.2 °F (36.8 °C) (10/29/22 0734)  Pulse: (!) 111 (10/29/22 0734)  Resp: 16 (10/29/22 0734)  BP: 134/80 (10/29/22 0734)  SpO2: 99 % (10/29/22 0734)   Vital Signs (24h Range):  Temp:  [97.1 °F (36.2 °C)-99.3 °F (37.4 °C)] 98.2 °F (36.8 °C)  Pulse:  [] 111  Resp:  [16-18] 16  SpO2:  [95 %-99 %] 99 %  BP: (101-144)/(61-80) 134/80     Intake/Output - Last 3 Shifts         10/27 0700  10/28 0659 10/28 0700  10/29 0659 10/29 0700  10/30 0659    P.O. 300 780     I.V. (mL/kg) 1568 (32.7) 3576.5 (74.7)     TPN  421.7     Total Intake(mL/kg) 1868 (39) 4778.2 (99.8)     Urine (mL/kg/hr) 1250 (1.1) 1650 (1.4)     Stool 2 0     Total Output 1252 1650     Net +616 +3128.2            Urine Occurrence 1 x      Stool Occurrence 4 x 2 x             Physical Exam  Vitals and nursing note reviewed.   Cardiovascular:      Rate and Rhythm: Normal rate.    Pulmonary:      Effort: Pulmonary effort is normal. No respiratory distress.   Abdominal:      General: There is distension.      Tenderness: There is abdominal tenderness.      Comments: Distension continues to improve, abdomen softer today. Right-sided tenderness to palpation still present. Midline incision healing well.        Significant Labs:  CBC (Last 3 Results):   Recent Labs   Lab 10/27/22  0235 10/28/22  0502 10/29/22  0520   WBC 11.08 7.52 6.49   RBC 3.17* 3.02* 2.81*   HGB 9.1* 8.7* 8.1*   HCT 28.9* 27.5* 25.9*   * 450 419   MCV 91 91 92   MCH 28.7 28.8 28.8   MCHC 31.5* 31.6* 31.3*     CMP (Last 3 Results):   Recent Labs   Lab 10/27/22  0235 10/28/22  0502 10/29/22  0523   GLU 95 107 97   CALCIUM 8.4* 8.1* 8.3*   ALBUMIN 1.9* 1.9* 1.9*   PROT 5.4* 5.5* 5.4*   * 133* 135*   K 3.9 4.3 3.8   CO2 21* 25 26    100 99   BUN 3* <3* <3*   CREATININE 0.6 0.6 0.6   ALKPHOS 77 74 72   ALT 14 13 11   AST 38 31 26   BILITOT 0.5 0.5 0.4     CRP (Last 3 Results):   Recent Labs   Lab 10/27/22  0235 10/28/22  0502 10/29/22  0520   .4* 135.4* 129.3*     All pertinent lab results within the last 24 hours have been reviewed.     Significant Diagnostics:  I have reviewed all pertinent imaging results/findings within the past 24 hours.    Assessment/Plan:     Colonic mass  Javier Martinez is a 72 y.o. M s/p extended right hemicolectomy 10/20. Patient has return of bowel function. Improved RLQ pain with now downtrending CRP. Pain is otherwise well controlled and incisions are clean, dry, and intact.      Plan:   - continue TPN  - advance to FLD  - continue mIVF  - continue PRN pain and nausea meds  - Flomax    - PT/OT            Ijeoma Stubbs MD  Colorectal Surgery  [facility]

## 2022-10-29 NOTE — PLAN OF CARE
POC is reviewed and understood by patient. VSS while on room air. Standby assist to toilet. TPN began last night ahd rate of fluids decreased. Clear liquid diet. Little appetite. No sign of distress noted. HOB elevated. Bed in lowest position. Call bell in reach. WCTM.

## 2022-10-29 NOTE — ASSESSMENT & PLAN NOTE
Javier Martinez is a 72 y.o. M s/p extended right hemicolectomy 10/20. Patient has return of bowel function. Improved RLQ pain with now downtrending CRP. Pain is otherwise well controlled and incisions are clean, dry, and intact.      Plan:   - continue TPN  - advance to FLD  - continue mIVF  - continue PRN pain and nausea meds  - Flomax    - PT/OT

## 2022-10-30 LAB
ALBUMIN SERPL BCP-MCNC: 1.9 G/DL (ref 3.5–5.2)
ALP SERPL-CCNC: 75 U/L (ref 55–135)
ALT SERPL W/O P-5'-P-CCNC: 11 U/L (ref 10–44)
ANION GAP SERPL CALC-SCNC: 11 MMOL/L (ref 8–16)
AST SERPL-CCNC: 26 U/L (ref 10–40)
BASOPHILS # BLD AUTO: 0.03 K/UL (ref 0–0.2)
BASOPHILS NFR BLD: 0.4 % (ref 0–1.9)
BILIRUB SERPL-MCNC: 0.3 MG/DL (ref 0.1–1)
BUN SERPL-MCNC: 5 MG/DL (ref 8–23)
CALCIUM SERPL-MCNC: 8.6 MG/DL (ref 8.7–10.5)
CHLORIDE SERPL-SCNC: 99 MMOL/L (ref 95–110)
CO2 SERPL-SCNC: 26 MMOL/L (ref 23–29)
CREAT SERPL-MCNC: 0.6 MG/DL (ref 0.5–1.4)
CRP SERPL-MCNC: 107.4 MG/L (ref 0–8.2)
DIFFERENTIAL METHOD: ABNORMAL
EOSINOPHIL # BLD AUTO: 0.2 K/UL (ref 0–0.5)
EOSINOPHIL NFR BLD: 2.9 % (ref 0–8)
ERYTHROCYTE [DISTWIDTH] IN BLOOD BY AUTOMATED COUNT: 15.7 % (ref 11.5–14.5)
EST. GFR  (NO RACE VARIABLE): >60 ML/MIN/1.73 M^2
GLUCOSE SERPL-MCNC: 102 MG/DL (ref 70–110)
HCT VFR BLD AUTO: 26 % (ref 40–54)
HGB BLD-MCNC: 8 G/DL (ref 14–18)
IMM GRANULOCYTES # BLD AUTO: 0.03 K/UL (ref 0–0.04)
IMM GRANULOCYTES NFR BLD AUTO: 0.4 % (ref 0–0.5)
LYMPHOCYTES # BLD AUTO: 1.7 K/UL (ref 1–4.8)
LYMPHOCYTES NFR BLD: 21.2 % (ref 18–48)
MAGNESIUM SERPL-MCNC: 1.9 MG/DL (ref 1.6–2.6)
MCH RBC QN AUTO: 28.5 PG (ref 27–31)
MCHC RBC AUTO-ENTMCNC: 30.8 G/DL (ref 32–36)
MCV RBC AUTO: 93 FL (ref 82–98)
MONOCYTES # BLD AUTO: 1.2 K/UL (ref 0.3–1)
MONOCYTES NFR BLD: 14.6 % (ref 4–15)
NEUTROPHILS # BLD AUTO: 4.9 K/UL (ref 1.8–7.7)
NEUTROPHILS NFR BLD: 60.5 % (ref 38–73)
NRBC BLD-RTO: 0 /100 WBC
PHOSPHATE SERPL-MCNC: 3.7 MG/DL (ref 2.7–4.5)
PLATELET # BLD AUTO: 395 K/UL (ref 150–450)
PMV BLD AUTO: 8.7 FL (ref 9.2–12.9)
POTASSIUM SERPL-SCNC: 3.9 MMOL/L (ref 3.5–5.1)
PROT SERPL-MCNC: 5.7 G/DL (ref 6–8.4)
RBC # BLD AUTO: 2.81 M/UL (ref 4.6–6.2)
SODIUM SERPL-SCNC: 136 MMOL/L (ref 136–145)
WBC # BLD AUTO: 8.02 K/UL (ref 3.9–12.7)

## 2022-10-30 PROCEDURE — 20600001 HC STEP DOWN PRIVATE ROOM

## 2022-10-30 PROCEDURE — 85025 COMPLETE CBC W/AUTO DIFF WBC: CPT | Performed by: STUDENT IN AN ORGANIZED HEALTH CARE EDUCATION/TRAINING PROGRAM

## 2022-10-30 PROCEDURE — 83735 ASSAY OF MAGNESIUM: CPT | Performed by: STUDENT IN AN ORGANIZED HEALTH CARE EDUCATION/TRAINING PROGRAM

## 2022-10-30 PROCEDURE — 25000003 PHARM REV CODE 250: Performed by: STUDENT IN AN ORGANIZED HEALTH CARE EDUCATION/TRAINING PROGRAM

## 2022-10-30 PROCEDURE — A4216 STERILE WATER/SALINE, 10 ML: HCPCS | Performed by: COLON & RECTAL SURGERY

## 2022-10-30 PROCEDURE — 84100 ASSAY OF PHOSPHORUS: CPT | Performed by: STUDENT IN AN ORGANIZED HEALTH CARE EDUCATION/TRAINING PROGRAM

## 2022-10-30 PROCEDURE — 25000003 PHARM REV CODE 250: Performed by: COLON & RECTAL SURGERY

## 2022-10-30 PROCEDURE — 80053 COMPREHEN METABOLIC PANEL: CPT | Performed by: STUDENT IN AN ORGANIZED HEALTH CARE EDUCATION/TRAINING PROGRAM

## 2022-10-30 PROCEDURE — 63600175 PHARM REV CODE 636 W HCPCS: Performed by: COLON & RECTAL SURGERY

## 2022-10-30 PROCEDURE — 63600175 PHARM REV CODE 636 W HCPCS: Performed by: STUDENT IN AN ORGANIZED HEALTH CARE EDUCATION/TRAINING PROGRAM

## 2022-10-30 PROCEDURE — 25000003 PHARM REV CODE 250

## 2022-10-30 PROCEDURE — 94761 N-INVAS EAR/PLS OXIMETRY MLT: CPT

## 2022-10-30 PROCEDURE — 86140 C-REACTIVE PROTEIN: CPT | Performed by: STUDENT IN AN ORGANIZED HEALTH CARE EDUCATION/TRAINING PROGRAM

## 2022-10-30 RX ADMIN — METHOCARBAMOL 500 MG: 500 TABLET ORAL at 02:10

## 2022-10-30 RX ADMIN — TAMSULOSIN HYDROCHLORIDE 0.8 MG: 0.4 CAPSULE ORAL at 08:10

## 2022-10-30 RX ADMIN — Medication 10 ML: at 06:10

## 2022-10-30 RX ADMIN — DEXTROSE, SODIUM CHLORIDE, AND POTASSIUM CHLORIDE: 5; .45; .15 INJECTION INTRAVENOUS at 01:10

## 2022-10-30 RX ADMIN — Medication 10 ML: at 12:10

## 2022-10-30 RX ADMIN — AMLODIPINE BESYLATE 10 MG: 10 TABLET ORAL at 08:10

## 2022-10-30 RX ADMIN — METHOCARBAMOL 500 MG: 500 TABLET ORAL at 10:10

## 2022-10-30 RX ADMIN — METHOCARBAMOL 500 MG: 500 TABLET ORAL at 08:10

## 2022-10-30 RX ADMIN — ENOXAPARIN SODIUM 30 MG: 30 INJECTION SUBCUTANEOUS at 05:10

## 2022-10-30 NOTE — SUBJECTIVE & OBJECTIVE
Subjective:     Interval History: NAEO.  Endorses good PO intake on FLD.  2 soft bowel movements overnight.  Mildly tender abdomen on exam.          Post-Op Info:  Procedure(s) (LRB):  HEMICOLECTOMY, RIGHT, extended (N/A)  OMENTECTOMY (N/A)   10 Days Post-Op      Medications:  Continuous Infusions:   dextrose 5 % and 0.45 % NaCl with KCl 20 mEq 50 mL/hr at 10/30/22 0109    TPN ADULT CENTRAL LINE CUSTOM 42 mL/hr at 10/29/22 2131     Scheduled Meds:   amLODIPine  10 mg Oral Daily    enoxaparin  30 mg Subcutaneous Daily    methocarbamoL  500 mg Oral TID    sodium chloride 0.9%  10 mL Intravenous Q6H    tamsulosin  0.8 mg Oral Daily     PRN Meds:   HYDROmorphone    naloxone    oxyCODONE    sodium chloride 0.9%    traMADoL        Objective:     Vital Signs (Most Recent):  Temp: 98.9 °F (37.2 °C) (10/30/22 0729)  Pulse: 99 (10/30/22 0729)  Resp: 18 (10/30/22 0729)  BP: 110/69 (10/30/22 0729)  SpO2: 97 % (10/30/22 0729) Vital Signs (24h Range):  Temp:  [98 °F (36.7 °C)-98.9 °F (37.2 °C)] 98.9 °F (37.2 °C)  Pulse:  [] 99  Resp:  [16-18] 18  SpO2:  [95 %-99 %] 97 %  BP: (109-134)/(67-77) 110/69     Intake/Output - Last 3 Shifts         10/28 0700  10/29 0659 10/29 0700  10/30 0659 10/30 0700  10/31 0659    P.O. 780 1280     I.V. (mL/kg) 3576.5 (74.7) 1094.6 (22.9)     .7 1259.7     Total Intake(mL/kg) 4778.2 (99.8) 3634.3 (75.9)     Urine (mL/kg/hr) 1650 (1.4) 1310 (1.1) 310 (2.1)    Emesis/NG output  0     Other  0     Stool 0 0     Total Output 1650 1310 310    Net +3128.2 +2324.3 -310           Urine Occurrence  4 x     Stool Occurrence 2 x 1 x     Emesis Occurrence  0 x             Physical Exam  Vitals and nursing note reviewed.   Cardiovascular:      Rate and Rhythm: Normal rate.   Pulmonary:      Effort: Pulmonary effort is normal. No respiratory distress.   Abdominal:      General: There is distension.      Tenderness: There is abdominal tenderness.      Comments: Distension continues to improve,  abdomen softer today. Right-sided tenderness to palpation still present. Midline incision healing well.        Significant Labs:  All pertinent lab results within the last 24 hours have been reviewed.     Significant Diagnostics:  I have reviewed all pertinent imaging results/findings within the past 24 hours.

## 2022-10-30 NOTE — PLAN OF CARE
POC is reviewed and understood by patient. VSS while on room air. Standby assist to toilet. TPN began last night ahd rate of fluids decreased. Clear liquid diet. Little appetite. No sign of distress noted. HOB elevated. Bed in lowest position. No c/o pain, nausea. Call bell in reach. WCTM.

## 2022-10-30 NOTE — PROGRESS NOTES
[facility]  Colorectal Surgery  Progress Note    Patient Name: Javier Downs  MRN: [id number]  Admission Date: 10/20/2022  Hospital Length of Stay: 10 days  Attending Physician: EDILMA Bonilla MD    Subjective:     Interval History: NAEO.  Endorses good PO intake on FLD.  2 soft bowel movements overnight.  Mildly tender abdomen on exam.          Post-Op Info:  Procedure(s) (LRB):  HEMICOLECTOMY, RIGHT, extended (N/A)  OMENTECTOMY (N/A)   10 Days Post-Op      Medications:  Continuous Infusions:   dextrose 5 % and 0.45 % NaCl with KCl 20 mEq 50 mL/hr at 10/30/22 0109    TPN ADULT CENTRAL LINE CUSTOM 42 mL/hr at 10/29/22 2131     Scheduled Meds:   amLODIPine  10 mg Oral Daily    enoxaparin  30 mg Subcutaneous Daily    methocarbamoL  500 mg Oral TID    sodium chloride 0.9%  10 mL Intravenous Q6H    tamsulosin  0.8 mg Oral Daily     PRN Meds:   HYDROmorphone    naloxone    oxyCODONE    sodium chloride 0.9%    traMADoL        Objective:     Vital Signs (Most Recent):  Temp: 98.9 °F (37.2 °C) (10/30/22 0729)  Pulse: 99 (10/30/22 0729)  Resp: 18 (10/30/22 0729)  BP: 110/69 (10/30/22 0729)  SpO2: 97 % (10/30/22 0729) Vital Signs (24h Range):  Temp:  [98 °F (36.7 °C)-98.9 °F (37.2 °C)] 98.9 °F (37.2 °C)  Pulse:  [] 99  Resp:  [16-18] 18  SpO2:  [95 %-99 %] 97 %  BP: (109-134)/(67-77) 110/69     Intake/Output - Last 3 Shifts         10/28 0700  10/29 0659 10/29 0700  10/30 0659 10/30 0700  10/31 0659    P.O. 780 1280     I.V. (mL/kg) 3576.5 (74.7) 1094.6 (22.9)     .7 1259.7     Total Intake(mL/kg) 4778.2 (99.8) 3634.3 (75.9)     Urine (mL/kg/hr) 1650 (1.4) 1310 (1.1) 310 (2.1)    Emesis/NG output  0     Other  0     Stool 0 0     Total Output 1650 1310 310    Net +3128.2 +2324.3 -310           Urine Occurrence  4 x     Stool Occurrence 2 x 1 x     Emesis Occurrence  0 x             Physical Exam  Vitals and nursing note reviewed.   Cardiovascular:      Rate and Rhythm: Normal rate.   Pulmonary:       Effort: Pulmonary effort is normal. No respiratory distress.   Abdominal:      General: There is distension.      Tenderness: There is abdominal tenderness.      Comments: Distension continues to improve, abdomen softer today. Right-sided tenderness to palpation still present. Midline incision healing well.        Significant Labs:  All pertinent lab results within the last 24 hours have been reviewed.     Significant Diagnostics:  I have reviewed all pertinent imaging results/findings within the past 24 hours.    Assessment/Plan:     Colonic mass  Javier Martinez is a 72 y.o. M s/p extended right hemicolectomy 10/20. Patient has return of bowel function. Improved RLQ pain with now downtrending CRP. Pain is otherwise well controlled and incisions are clean, dry, and intact.      Plan:   - continue TPN  - advance to FLD  - continue mIVF  - continue PRN pain and nausea meds  - Flomax    - PT/OT            Ijeoma Stubbs MD  Colorectal Surgery  [facility]

## 2022-10-30 NOTE — PLAN OF CARE
Plan of care reviewed with pt:  -AAOx4, on RA, VS stable   -ABD incision with DB CDI VASHTI  -Had a BM this morning  -Tolerated his full liquid diet, had about 50% of his meals, no nausea but said he got full quickly. Pt did not want to drink Boost today, said he will drink it tmr. Will be on regular diet after midnight.   -Voided with adequate amount yellow urine  -ambulated in the room and hallway independently  -Denied pain  -Call light in reach, WCTM

## 2022-10-31 LAB
ALBUMIN SERPL BCP-MCNC: 2.1 G/DL (ref 3.5–5.2)
ALP SERPL-CCNC: 86 U/L (ref 55–135)
ALT SERPL W/O P-5'-P-CCNC: 9 U/L (ref 10–44)
ANION GAP SERPL CALC-SCNC: 7 MMOL/L (ref 8–16)
AST SERPL-CCNC: 34 U/L (ref 10–40)
BASOPHILS # BLD AUTO: 0.05 K/UL (ref 0–0.2)
BASOPHILS NFR BLD: 0.5 % (ref 0–1.9)
BILIRUB SERPL-MCNC: 0.5 MG/DL (ref 0.1–1)
BUN SERPL-MCNC: 8 MG/DL (ref 8–23)
CALCIUM SERPL-MCNC: 8.8 MG/DL (ref 8.7–10.5)
CHLORIDE SERPL-SCNC: 99 MMOL/L (ref 95–110)
CO2 SERPL-SCNC: 27 MMOL/L (ref 23–29)
CREAT SERPL-MCNC: 0.6 MG/DL (ref 0.5–1.4)
CRP SERPL-MCNC: 105.7 MG/L (ref 0–8.2)
DIFFERENTIAL METHOD: ABNORMAL
EOSINOPHIL # BLD AUTO: 0.2 K/UL (ref 0–0.5)
EOSINOPHIL NFR BLD: 1.7 % (ref 0–8)
ERYTHROCYTE [DISTWIDTH] IN BLOOD BY AUTOMATED COUNT: 15.8 % (ref 11.5–14.5)
EST. GFR  (NO RACE VARIABLE): >60 ML/MIN/1.73 M^2
GLUCOSE SERPL-MCNC: 85 MG/DL (ref 70–110)
HCT VFR BLD AUTO: 26.5 % (ref 40–54)
HGB BLD-MCNC: 8.4 G/DL (ref 14–18)
IMM GRANULOCYTES # BLD AUTO: 0.02 K/UL (ref 0–0.04)
IMM GRANULOCYTES NFR BLD AUTO: 0.2 % (ref 0–0.5)
LYMPHOCYTES # BLD AUTO: 2.9 K/UL (ref 1–4.8)
LYMPHOCYTES NFR BLD: 26.5 % (ref 18–48)
MAGNESIUM SERPL-MCNC: 1.8 MG/DL (ref 1.6–2.6)
MCH RBC QN AUTO: 28.6 PG (ref 27–31)
MCHC RBC AUTO-ENTMCNC: 31.7 G/DL (ref 32–36)
MCV RBC AUTO: 90 FL (ref 82–98)
MONOCYTES # BLD AUTO: 1.5 K/UL (ref 0.3–1)
MONOCYTES NFR BLD: 14 % (ref 4–15)
NEUTROPHILS # BLD AUTO: 6.2 K/UL (ref 1.8–7.7)
NEUTROPHILS NFR BLD: 57.1 % (ref 38–73)
NRBC BLD-RTO: 0 /100 WBC
PHOSPHATE SERPL-MCNC: 3.9 MG/DL (ref 2.7–4.5)
PLATELET # BLD AUTO: 468 K/UL (ref 150–450)
PMV BLD AUTO: 8.8 FL (ref 9.2–12.9)
POTASSIUM SERPL-SCNC: 4 MMOL/L (ref 3.5–5.1)
PROT SERPL-MCNC: 5.9 G/DL (ref 6–8.4)
RBC # BLD AUTO: 2.94 M/UL (ref 4.6–6.2)
SODIUM SERPL-SCNC: 133 MMOL/L (ref 136–145)
WBC # BLD AUTO: 10.87 K/UL (ref 3.9–12.7)

## 2022-10-31 PROCEDURE — 20600001 HC STEP DOWN PRIVATE ROOM

## 2022-10-31 PROCEDURE — 86140 C-REACTIVE PROTEIN: CPT | Performed by: STUDENT IN AN ORGANIZED HEALTH CARE EDUCATION/TRAINING PROGRAM

## 2022-10-31 PROCEDURE — 25000003 PHARM REV CODE 250: Performed by: STUDENT IN AN ORGANIZED HEALTH CARE EDUCATION/TRAINING PROGRAM

## 2022-10-31 PROCEDURE — 85025 COMPLETE CBC W/AUTO DIFF WBC: CPT | Performed by: STUDENT IN AN ORGANIZED HEALTH CARE EDUCATION/TRAINING PROGRAM

## 2022-10-31 PROCEDURE — 25000003 PHARM REV CODE 250: Performed by: COLON & RECTAL SURGERY

## 2022-10-31 PROCEDURE — 80053 COMPREHEN METABOLIC PANEL: CPT | Performed by: STUDENT IN AN ORGANIZED HEALTH CARE EDUCATION/TRAINING PROGRAM

## 2022-10-31 PROCEDURE — 63600175 PHARM REV CODE 636 W HCPCS: Performed by: STUDENT IN AN ORGANIZED HEALTH CARE EDUCATION/TRAINING PROGRAM

## 2022-10-31 PROCEDURE — 83735 ASSAY OF MAGNESIUM: CPT | Performed by: STUDENT IN AN ORGANIZED HEALTH CARE EDUCATION/TRAINING PROGRAM

## 2022-10-31 PROCEDURE — 25000003 PHARM REV CODE 250

## 2022-10-31 PROCEDURE — 84100 ASSAY OF PHOSPHORUS: CPT | Performed by: STUDENT IN AN ORGANIZED HEALTH CARE EDUCATION/TRAINING PROGRAM

## 2022-10-31 PROCEDURE — A4216 STERILE WATER/SALINE, 10 ML: HCPCS | Performed by: COLON & RECTAL SURGERY

## 2022-10-31 RX ADMIN — METHOCARBAMOL 500 MG: 500 TABLET ORAL at 10:10

## 2022-10-31 RX ADMIN — TAMSULOSIN HYDROCHLORIDE 0.8 MG: 0.4 CAPSULE ORAL at 08:10

## 2022-10-31 RX ADMIN — Medication 10 ML: at 12:10

## 2022-10-31 RX ADMIN — METHOCARBAMOL 500 MG: 500 TABLET ORAL at 02:10

## 2022-10-31 RX ADMIN — METHOCARBAMOL 500 MG: 500 TABLET ORAL at 08:10

## 2022-10-31 RX ADMIN — AMLODIPINE BESYLATE 10 MG: 10 TABLET ORAL at 08:10

## 2022-10-31 RX ADMIN — Medication 10 ML: at 06:10

## 2022-10-31 RX ADMIN — ENOXAPARIN SODIUM 30 MG: 30 INJECTION SUBCUTANEOUS at 05:10

## 2022-10-31 NOTE — PROGRESS NOTES
[facility]  Colorectal Surgery  Progress Note    Patient Name: Javier Downs  MRN: [id number]  Admission Date: 10/20/2022  Hospital Length of Stay: 11 days  Attending Physician: EDILMA Bonilla MD    Subjective:     Interval History: no acute events overnite, low grade temp last night, eating well, 2 soft stools with flatus    Post-Op Info:  Procedure(s) (LRB):  HEMICOLECTOMY, RIGHT, extended (N/A)  OMENTECTOMY (N/A)   11 Days Post-Op      Medications:  Continuous Infusions:  Scheduled Meds:   amLODIPine  10 mg Oral Daily    enoxaparin  30 mg Subcutaneous Daily    fat emulsion 20%  250 mL Intravenous Daily    methocarbamoL  500 mg Oral TID    sodium chloride 0.9%  10 mL Intravenous Q6H    tamsulosin  0.8 mg Oral Daily     PRN Meds:   HYDROmorphone    naloxone    oxyCODONE    sodium chloride 0.9%    traMADoL        Objective:     Vital Signs (Most Recent):  Temp: 98.6 °F (37 °C) (10/31/22 0726)  Pulse: 92 (10/31/22 0726)  Resp: 18 (10/31/22 0726)  BP: 111/70 (10/31/22 0726)  SpO2: (!) 94 % (10/31/22 0726)   Vital Signs (24h Range):  Temp:  [98.2 °F (36.8 °C)-100.4 °F (38 °C)] 98.6 °F (37 °C)  Pulse:  [] 92  Resp:  [16-18] 18  SpO2:  [94 %-99 %] 94 %  BP: (100-137)/(66-81) 111/70     Intake/Output - Last 3 Shifts         10/29 0700  10/30 0659 10/30 0700  10/31 0659 10/31 0700  11/01 0659    P.O. 1280 740 240    I.V. (mL/kg) 1094.6 (22.9) 402.4 (8.4)     TPN 1259.7 783.8     Total Intake(mL/kg) 3634.3 (75.9) 1926.2 (40.2) 240 (5)    Urine (mL/kg/hr) 1310 (1.1) 2160 (1.9) 200 (0.9)    Emesis/NG output 0 0 0    Other 0 0 0    Stool 0 0 0    Total Output 1310 2160 200    Net +2324.3 -233.8 +40           Urine Occurrence 4 x 4 x 1 x    Stool Occurrence 1 x 3 x 0 x    Emesis Occurrence 0 x 0 x 0 x            Physical Exam  Constitutional:       Appearance: He is well-developed.   Cardiovascular:      Rate and Rhythm: Normal rate and regular rhythm.      Heart sounds: Normal heart sounds.   Pulmonary:       Effort: Pulmonary effort is normal. No respiratory distress.      Breath sounds: Normal breath sounds. No wheezing or rales.   Abdominal:      General: There is no distension.      Palpations: Abdomen is soft. There is no mass.      Tenderness: There is no abdominal tenderness. There is no guarding.      Comments: Abd in c line healing well   Musculoskeletal:         General: Normal range of motion.   Skin:     General: Skin is warm and dry.   Neurological:      Mental Status: He is alert and oriented to person, place, and time.   Psychiatric:         Behavior: Behavior normal.         Thought Content: Thought content normal.         Judgment: Judgment normal.           Significant Labs:  BMP (Last 3 Results):   Recent Labs   Lab 10/29/22  0523 10/30/22  0519 10/31/22  0602   GLU 97 102 85   * 136 133*   K 3.8 3.9 4.0   CL 99 99 99   CO2 26 26 27   BUN <3* 5* 8   CREATININE 0.6 0.6 0.6   CALCIUM 8.3* 8.6* 8.8   MG 1.7 1.9 1.8     CBC (Last 3 Results):   Recent Labs   Lab 10/29/22  0520 10/30/22  0519 10/31/22  0602   WBC 6.49 8.02 10.87   RBC 2.81* 2.81* 2.94*   HGB 8.1* 8.0* 8.4*   HCT 25.9* 26.0* 26.5*    395 468*   MCV 92 93 90   MCH 28.8 28.5 28.6   MCHC 31.3* 30.8* 31.7*       Significant Diagnostics:  None    Assessment/Plan:     Microcytic anemia  monitor labs  Monitor vs    Hypertension  Chronic, controlled.  Latest blood pressure and vitals reviewed-   Temp:  [98.2 °F (36.8 °C)-100.4 °F (38 °C)]   Pulse:  []   Resp:  [16-18]   BP: (100-137)/(66-81)   SpO2:  [94 %-99 %] .   Home meds for hypertension were reviewed and noted below.   Hypertension Medications             amLODIPine (NORVASC) 10 MG tablet Take 1 tablet (10 mg total) by mouth once daily.          While in the hospital, will manage blood pressure as follows; Continue home antihypertensive regimen    Will utilize p.r.n. blood pressure medication only if patient's blood pressure greater than  180/110 and he develops symptoms  such as worsening chest pain or shortness of breath.    Colonic mass  Javier Martinez is a 72 y.o. M s/p extended right hemicolectomy 10/20. Patient has return of bowel function. Improved RLQ pain with now downtrending CRP. Pain is otherwise well controlled and incisions are clean, dry, and intact.      Plan:   - continue TPN  - advance to FLD  - continue mIVF  - continue PRN pain and nausea meds  - Flomax    - PT/OT            Elsy Kelly NP  Colorectal Surgery  [facility]

## 2022-10-31 NOTE — PHYSICIAN QUERY
PT Name: Javier Downs  MR #: [id number]    DOCUMENTATION CLARIFICATION     CDS: Brandy Capley, RN  Email: [email or web address]       This form is a permanent document in the medical record.     Query Date: October 31, 2022    By submitting this query, we are merely seeking further clarification of documentation.. Please utilize your independent clinical judgment when addressing the question(s) below.    The medical record contains the following:   Indicators  Supporting Clinical Findings Location in Medical Record   X Energy Intake Energy Intake: <75% of estimated energy requirement for >1 month   Nutrition consult 10/28   X Weight Loss Weight Loss: 13% x 1 month   Nutrition consult 10/28   X Fat Loss Body Fat Depletion: severe depletion of triceps and thoracic and lumbar region    Nutrition consult 10/28   X Muscle Loss Muscle Mass Depletion: severe depletion of clavicle region and lower extremities    Nutrition consult 10/28    Edema/Fluid Accumulation      Reduced  Strength (by dynamometer)     X Weight, BMI, Usual Body Weight Weight: 47.9 kg (105 lb 9.6 oz)  Weight (lb): 105.6 lb  Ideal Body Weight (IBW), Male: 166 lb  % Ideal Body Weight, Male (lb): 63.61 %  BMI (Calculated): 15.2  BMI Grade: less than 16 protein-energy malnutrition grade III   Nutrition consult 10/28    Delayed Wound Healing     X Registered Dietician Diagnosis Severe Protein-Calorie Malnutrition Malnutrition in the context of Chronic Illness/Injury  Related to (etiology): Decreased ability to consume sufficient energy intake    Pt meets criteria for severe protein malnutrition in the context of chronic illness per ASPEN guidelines at this time.    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 His colon cancer was nearly obstructive and was stented 04/20/21 with Dr. Bonilla. He also recently underwent a diagnostic lap with washings to rule out carcinomatosis in preparation for a formal liver resection 09/07/22 with Dr. BUD Rodriguez.      Mr. Downs presents today for resection of his colon cancer.     extended right colectomy, en bloc omentectomy, splenic flexure mobilization   H&P 10/20                Op note 10/20, filed 10/23    Social or Environmental Circumstances     X Treatment Recommendations  1. Increase custom TPN: 204 g D, 81 g AA+ IV lipids (GIR: 2.96)- this provides 1517 kcals, 81 g of protein   2. Closely monitor for nutrition related- labs and adjust TPN prn   3. If TF recs warranted, rec'd [employer] 1.5 @ 45 ml/hr to provide 1661 kcals, 80 g of protein, and 756 ml of fluid.   4. Advancement of diet per Speech recommendations    Dietary nutrition supplements [facility]; Boost Plus - Any flavor  All Meals    Standard Custom Day One ADULT TPN for patient with NO electrolyte abnormality and NO renal dysfunction    Nutrition consult 10/28            Orders 10/29    MAR 10/29   X Other General Information Comments:   RD consulted for TPN recommendations, Noted pt is currently on a clear liquid diet. Unsure of nutrition hx PTA. Noted 13% significant weight loss x 1 month. TPN currently ordered provides 151 g D + 50 g AA + IV lipids- not meeting pt's needs. TPN recommendations provided in POC    Malnutrition   Nutrition consult 10/28          Colon and rectal surgery progress notes 10/28     Academy of Nutrition and Dietetics (Academy) and the American Society for Parenteral and Enteral Nutrition (A.S.P.E.N.) Clinical Characteristics to support Malnutrition   Malnutrition in the Context of Acute Illness or Injury Malnutrition in the Context of Chronic Illness or Injury Malnutrition in the Context of Social or Environmental Circumstances   Malnutrition Level Moderate  Severe Moderate Severe   Moderate   Severe   Energy Intake <75%                   >7 days <50%                 >5 days <75%           >1 month <75%                      >1 month   <75% for >3 months   <50% for >1 month   Weight Loss   1-2% in 1 week >2% in 1 week 5% in 1 month >5% in 1 month 5% in 1 month >5% in 1 month    5% in 1 month >5% in 1 month 7.5% in 3 months >7.5% in 3 months 7.5% in 3 months >7.5% in 3 months    7.5% in 3 months >7.5% in 3 months 10% in 6 months >10% in 6 months 10% in 6 months >10% in 6 months        20% in 1 year                    >20% in 1 year                                                                  20% in 1 year                            >20% in 1 year                                                  Subcutaneous Fat Loss Mild  Moderate  Mild  Severe    Mild   Severe   Muscle Loss Mild depletion Moderate depletion  Mild depletion Severe Depletion   Mild   Severe   Edema/Fluid Accumulation Mild Moderate to severe  Mild  Severe   Mild   Severe   Reduced  Strength         (based on standards supplied by  of dynamometer) N/A Measurably reduced N/A Measurably reduced N/A Measurably reduced     Criteria for mild malnutrition is defined as 1 characteristic outlined above within the established moderate or severe parameters.  A minimum of 2 out of the 6 characteristics noted above are recommended for a diagnosis of moderate or severe malnutrition.  Chronic illness/injury is a disease/condition lasting 3 months or longer.    The noted clinical guidelines are only system guidelines and do not replace the providers clinical judgment.    Provider, please further specify malnutrition:     [  ] Mild Malnutrition -   1 characteristic outlined above within the established moderate or severe parameters     [x  ] Moderate Malnutrition -   a minimum of 2 of the 6 moderate malnutrition characteristics noted above      [  ] Severe Malnutrition -   a minimum of 2 of the 6 severe  malnutrition characteristics noted above      [  ] Other Nutritional Diagnosis (please specify): _______     [  ]   Clinically Undetermined       Please document in your progress notes daily for the duration of treatment until resolved and  include in your discharge summary.      References:    JULIUS Meyer, & NANCY Sy (2022, April). Assessment and management of anorexia and cachexia in palliative care. Retrieved May 23, 2022, from [email or web address]/contents/assessment-and-management-of-anorexia-[email or web address]-in-palliative-care?[email or web address]&source=see_link     TORRES Abbott, PhD, RD, JEANETTE, VENTURA Ricks, PhD, RN, AMANDA Victoria MD, PhD, Valeria REYES A., MS, RD, [facility], MIGUEL Gonzalez, MS, RD, The Academy Malnutrition Work Group, The A.S.P.E.N. Board of Directors. (2012). Consensus Statement: Academy of Nutrition and Dietetics and American Society for Parenteral and Enteral Nutrition: Characteristics Recommended for the Identification and Documentation of Adult Malnutrition (Undernutrition). Journal of Parenteral and Enteral Nutrition, 36(3), 275-283. doi:10.1177/0374346383238824     Form No. [id number]

## 2022-10-31 NOTE — ASSESSMENT & PLAN NOTE
Chronic, controlled.  Latest blood pressure and vitals reviewed-   Temp:  [98.2 °F (36.8 °C)-100.4 °F (38 °C)]   Pulse:  []   Resp:  [16-18]   BP: (100-137)/(66-81)   SpO2:  [94 %-99 %] .   Home meds for hypertension were reviewed and noted below.   Hypertension Medications             amLODIPine (NORVASC) 10 MG tablet Take 1 tablet (10 mg total) by mouth once daily.          While in the hospital, will manage blood pressure as follows; Continue home antihypertensive regimen    Will utilize p.r.n. blood pressure medication only if patient's blood pressure greater than  180/110 and he develops symptoms such as worsening chest pain or shortness of breath.

## 2022-10-31 NOTE — NURSING
Pt had a slight fever last night. No Tylenol ordered. MD on call acknowledges it and states not necessary to order it. Pt's temp went down post application of cold towel to forehead.

## 2022-10-31 NOTE — PLAN OF CARE
POC is reviewed and understood by patient. VSS while on room air. Standby assist to toilet. TPN is no longer ordered. Pt advanced to a regular, hopefully remains tolerable to patient. Clear liquid diet. Little appetite. No sign of distress noted. HOB elevated. Bed in lowest position. No c/o pain, nausea. Call bell in reach. WCTM.

## 2022-10-31 NOTE — SUBJECTIVE & OBJECTIVE
Subjective:     Interval History: no acute events overnite, low grade temp last night, eating well, 2 soft stools with flatus    Post-Op Info:  Procedure(s) (LRB):  HEMICOLECTOMY, RIGHT, extended (N/A)  OMENTECTOMY (N/A)   11 Days Post-Op      Medications:  Continuous Infusions:  Scheduled Meds:   amLODIPine  10 mg Oral Daily    enoxaparin  30 mg Subcutaneous Daily    fat emulsion 20%  250 mL Intravenous Daily    methocarbamoL  500 mg Oral TID    sodium chloride 0.9%  10 mL Intravenous Q6H    tamsulosin  0.8 mg Oral Daily     PRN Meds:   HYDROmorphone    naloxone    oxyCODONE    sodium chloride 0.9%    traMADoL        Objective:     Vital Signs (Most Recent):  Temp: 98.6 °F (37 °C) (10/31/22 0726)  Pulse: 92 (10/31/22 0726)  Resp: 18 (10/31/22 0726)  BP: 111/70 (10/31/22 0726)  SpO2: (!) 94 % (10/31/22 0726)   Vital Signs (24h Range):  Temp:  [98.2 °F (36.8 °C)-100.4 °F (38 °C)] 98.6 °F (37 °C)  Pulse:  [] 92  Resp:  [16-18] 18  SpO2:  [94 %-99 %] 94 %  BP: (100-137)/(66-81) 111/70     Intake/Output - Last 3 Shifts         10/29 0700  10/30 0659 10/30 0700  10/31 0659 10/31 0700  11/01 0659    P.O. 1280 740 240    I.V. (mL/kg) 1094.6 (22.9) 402.4 (8.4)     TPN 1259.7 783.8     Total Intake(mL/kg) 3634.3 (75.9) 1926.2 (40.2) 240 (5)    Urine (mL/kg/hr) 1310 (1.1) 2160 (1.9) 200 (0.9)    Emesis/NG output 0 0 0    Other 0 0 0    Stool 0 0 0    Total Output 1310 2160 200    Net +2324.3 -233.8 +40           Urine Occurrence 4 x 4 x 1 x    Stool Occurrence 1 x 3 x 0 x    Emesis Occurrence 0 x 0 x 0 x            Physical Exam  Constitutional:       Appearance: He is well-developed.   Cardiovascular:      Rate and Rhythm: Normal rate and regular rhythm.      Heart sounds: Normal heart sounds.   Pulmonary:      Effort: Pulmonary effort is normal. No respiratory distress.      Breath sounds: Normal breath sounds. No wheezing or rales.   Abdominal:      General: There is no distension.      Palpations: Abdomen is soft.  There is no mass.      Tenderness: There is no abdominal tenderness. There is no guarding.      Comments: Abd in c line healing well   Musculoskeletal:         General: Normal range of motion.   Skin:     General: Skin is warm and dry.   Neurological:      Mental Status: He is alert and oriented to person, place, and time.   Psychiatric:         Behavior: Behavior normal.         Thought Content: Thought content normal.         Judgment: Judgment normal.           Significant Labs:  BMP (Last 3 Results):   Recent Labs   Lab 10/29/22  0523 10/30/22  0519 10/31/22  0602   GLU 97 102 85   * 136 133*   K 3.8 3.9 4.0   CL 99 99 99   CO2 26 26 27   BUN <3* 5* 8   CREATININE 0.6 0.6 0.6   CALCIUM 8.3* 8.6* 8.8   MG 1.7 1.9 1.8     CBC (Last 3 Results):   Recent Labs   Lab 10/29/22  0520 10/30/22  0519 10/31/22  0602   WBC 6.49 8.02 10.87   RBC 2.81* 2.81* 2.94*   HGB 8.1* 8.0* 8.4*   HCT 25.9* 26.0* 26.5*    395 468*   MCV 92 93 90   MCH 28.8 28.5 28.6   MCHC 31.3* 30.8* 31.7*       Significant Diagnostics:  None

## 2022-11-01 LAB
ALBUMIN SERPL BCP-MCNC: 2 G/DL (ref 3.5–5.2)
ALP SERPL-CCNC: 105 U/L (ref 55–135)
ALT SERPL W/O P-5'-P-CCNC: 13 U/L (ref 10–44)
ANION GAP SERPL CALC-SCNC: 7 MMOL/L (ref 8–16)
AST SERPL-CCNC: 48 U/L (ref 10–40)
BASOPHILS # BLD AUTO: 0.06 K/UL (ref 0–0.2)
BASOPHILS NFR BLD: 0.5 % (ref 0–1.9)
BILIRUB SERPL-MCNC: 0.9 MG/DL (ref 0.1–1)
BUN SERPL-MCNC: 10 MG/DL (ref 8–23)
CALCIUM SERPL-MCNC: 8.4 MG/DL (ref 8.7–10.5)
CHLORIDE SERPL-SCNC: 98 MMOL/L (ref 95–110)
CO2 SERPL-SCNC: 27 MMOL/L (ref 23–29)
CREAT SERPL-MCNC: 0.6 MG/DL (ref 0.5–1.4)
CRP SERPL-MCNC: 159.5 MG/L (ref 0–8.2)
DIFFERENTIAL METHOD: ABNORMAL
EOSINOPHIL # BLD AUTO: 0.2 K/UL (ref 0–0.5)
EOSINOPHIL NFR BLD: 1.5 % (ref 0–8)
ERYTHROCYTE [DISTWIDTH] IN BLOOD BY AUTOMATED COUNT: 15.8 % (ref 11.5–14.5)
EST. GFR  (NO RACE VARIABLE): >60 ML/MIN/1.73 M^2
GLUCOSE SERPL-MCNC: 77 MG/DL (ref 70–110)
HCT VFR BLD AUTO: 24.9 % (ref 40–54)
HGB BLD-MCNC: 7.8 G/DL (ref 14–18)
IMM GRANULOCYTES # BLD AUTO: 0.05 K/UL (ref 0–0.04)
IMM GRANULOCYTES NFR BLD AUTO: 0.5 % (ref 0–0.5)
LYMPHOCYTES # BLD AUTO: 2.3 K/UL (ref 1–4.8)
LYMPHOCYTES NFR BLD: 20.7 % (ref 18–48)
MAGNESIUM SERPL-MCNC: 1.7 MG/DL (ref 1.6–2.6)
MCH RBC QN AUTO: 28.3 PG (ref 27–31)
MCHC RBC AUTO-ENTMCNC: 31.3 G/DL (ref 32–36)
MCV RBC AUTO: 90 FL (ref 82–98)
MONOCYTES # BLD AUTO: 1.9 K/UL (ref 0.3–1)
MONOCYTES NFR BLD: 17 % (ref 4–15)
NEUTROPHILS # BLD AUTO: 6.6 K/UL (ref 1.8–7.7)
NEUTROPHILS NFR BLD: 59.8 % (ref 38–73)
NRBC BLD-RTO: 0 /100 WBC
PHOSPHATE SERPL-MCNC: 3.5 MG/DL (ref 2.7–4.5)
PLATELET # BLD AUTO: 395 K/UL (ref 150–450)
PMV BLD AUTO: 9 FL (ref 9.2–12.9)
POCT GLUCOSE: 85 MG/DL (ref 70–110)
POTASSIUM SERPL-SCNC: 3.8 MMOL/L (ref 3.5–5.1)
PROT SERPL-MCNC: 5.8 G/DL (ref 6–8.4)
RBC # BLD AUTO: 2.76 M/UL (ref 4.6–6.2)
SODIUM SERPL-SCNC: 132 MMOL/L (ref 136–145)
WBC # BLD AUTO: 11.02 K/UL (ref 3.9–12.7)

## 2022-11-01 PROCEDURE — A4216 STERILE WATER/SALINE, 10 ML: HCPCS | Performed by: COLON & RECTAL SURGERY

## 2022-11-01 PROCEDURE — 25500020 PHARM REV CODE 255

## 2022-11-01 PROCEDURE — 25500020 PHARM REV CODE 255: Performed by: COLON & RECTAL SURGERY

## 2022-11-01 PROCEDURE — 25000003 PHARM REV CODE 250: Performed by: COLON & RECTAL SURGERY

## 2022-11-01 PROCEDURE — 84100 ASSAY OF PHOSPHORUS: CPT | Performed by: STUDENT IN AN ORGANIZED HEALTH CARE EDUCATION/TRAINING PROGRAM

## 2022-11-01 PROCEDURE — 85025 COMPLETE CBC W/AUTO DIFF WBC: CPT | Performed by: STUDENT IN AN ORGANIZED HEALTH CARE EDUCATION/TRAINING PROGRAM

## 2022-11-01 PROCEDURE — 63600175 PHARM REV CODE 636 W HCPCS: Performed by: STUDENT IN AN ORGANIZED HEALTH CARE EDUCATION/TRAINING PROGRAM

## 2022-11-01 PROCEDURE — 86140 C-REACTIVE PROTEIN: CPT | Performed by: STUDENT IN AN ORGANIZED HEALTH CARE EDUCATION/TRAINING PROGRAM

## 2022-11-01 PROCEDURE — 20600001 HC STEP DOWN PRIVATE ROOM

## 2022-11-01 PROCEDURE — 25000003 PHARM REV CODE 250: Performed by: STUDENT IN AN ORGANIZED HEALTH CARE EDUCATION/TRAINING PROGRAM

## 2022-11-01 PROCEDURE — 80053 COMPREHEN METABOLIC PANEL: CPT | Performed by: STUDENT IN AN ORGANIZED HEALTH CARE EDUCATION/TRAINING PROGRAM

## 2022-11-01 PROCEDURE — 63600175 PHARM REV CODE 636 W HCPCS: Performed by: NURSE PRACTITIONER

## 2022-11-01 PROCEDURE — 25000003 PHARM REV CODE 250

## 2022-11-01 PROCEDURE — 83735 ASSAY OF MAGNESIUM: CPT | Performed by: STUDENT IN AN ORGANIZED HEALTH CARE EDUCATION/TRAINING PROGRAM

## 2022-11-01 RX ORDER — ONDANSETRON 2 MG/ML
8 INJECTION INTRAMUSCULAR; INTRAVENOUS EVERY 6 HOURS PRN
Status: DISCONTINUED | OUTPATIENT
Start: 2022-11-01 | End: 2022-11-02 | Stop reason: HOSPADM

## 2022-11-01 RX ADMIN — IOHEXOL 15 ML: 350 INJECTION, SOLUTION INTRAVENOUS at 01:11

## 2022-11-01 RX ADMIN — AMLODIPINE BESYLATE 10 MG: 10 TABLET ORAL at 09:11

## 2022-11-01 RX ADMIN — Medication 10 ML: at 11:11

## 2022-11-01 RX ADMIN — Medication 10 ML: at 06:11

## 2022-11-01 RX ADMIN — Medication 10 ML: at 12:11

## 2022-11-01 RX ADMIN — IOHEXOL 75 ML: 350 INJECTION, SOLUTION INTRAVENOUS at 05:11

## 2022-11-01 RX ADMIN — ENOXAPARIN SODIUM 30 MG: 30 INJECTION SUBCUTANEOUS at 04:11

## 2022-11-01 RX ADMIN — METHOCARBAMOL 500 MG: 500 TABLET ORAL at 09:11

## 2022-11-01 RX ADMIN — METHOCARBAMOL 500 MG: 500 TABLET ORAL at 08:11

## 2022-11-01 RX ADMIN — TAMSULOSIN HYDROCHLORIDE 0.8 MG: 0.4 CAPSULE ORAL at 09:11

## 2022-11-01 RX ADMIN — ONDANSETRON 8 MG: 2 INJECTION INTRAMUSCULAR; INTRAVENOUS at 02:11

## 2022-11-01 NOTE — ASSESSMENT & PLAN NOTE
Javier Martinez is a 72 y.o. M s/p extended right hemicolectomy 10/20. Patient has return of bowel function. Improved RLQ pain with now downtrending CRP. Pain is otherwise well controlled and incisions are clean, dry, and intact.      Plan:   - eating well, tpn cyndee  - advance to FLD  - continue mIVF  - continue PRN pain and nausea meds  - Flomax    - PT/OT

## 2022-11-01 NOTE — NURSING
Pt has a CT scan with oral contrast, only able to give him the first dose of contrast . Then pt stated that the contrast made him nauseated, and he refused the second dose. No emesis, Zofran given for nausea, nausea relieved but pt still refused to drink the second dose of the contrast. Notified the CT scan tech.   Pt has been agitated about not being discharged. He said he feels frustrated and wants to go home. Pt has been NPO after breakfast for the CT scan, but even with breakfast, he barely ate anything, stated the food tasted bad, and he did not like it. Boost supplement provided, but he did not want to drink it either. Pt asked if he can be put back on full liquid, notified NP Elsy    Update: per CT scan tech, they can still do the CT scan even though pt only had 1 dose of oral contrast. Pt will get IV contrast downstairs

## 2022-11-01 NOTE — PLAN OF CARE
POC is reviewed and understood by patient. VSS while on room air. Standby assist to toilet. Pt has had no more elevated temps throughout this shift. Pt advanced to a regular, hopefully remains tolerable to patient. Little appetite. No sign of distress noted. HOB elevated. Bed in lowest position. No c/o pain, nausea. Call bell in reach. WCTM

## 2022-11-01 NOTE — ASSESSMENT & PLAN NOTE
Chronic, controlled.  Latest blood pressure and vitals reviewed-   Temp:  [98.1 °F (36.7 °C)-99.6 °F (37.6 °C)]   Pulse:  []   Resp:  [16-20]   BP: (100-111)/(65-72)   SpO2:  [93 %-98 %] .   Home meds for hypertension were reviewed and noted below.   Hypertension Medications             amLODIPine (NORVASC) 10 MG tablet Take 1 tablet (10 mg total) by mouth once daily.          While in the hospital, will manage blood pressure as follows; Continue home antihypertensive regimen    Will utilize p.r.n. blood pressure medication only if patient's blood pressure greater than  180/110 and he develops symptoms such as worsening chest pain or shortness of breath.

## 2022-11-01 NOTE — PLAN OF CARE
[facility]  Discharge Reassessment    Primary Care Provider: Renetta García MD    Expected Discharge Date: 11/2/2022    Reassessment (most recent)       Discharge Reassessment - 11/01/22 1305          Discharge Reassessment    Assessment Type Discharge Planning Reassessment     Did the patient's condition or plan change since previous assessment? No     Discharge Plan discussed with: Patient     Communicated JOSEFA with patient/caregiver Yes     Discharge Plan A Home with family     Discharge Plan B Home     DME Needed Upon Discharge  none     Discharge Barriers Identified None        Post-Acute Status    Hospital Resources/Appts/Education Provided Provided patient/caregiver with written discharge plan information     Discharge Delays None known at this time                     Pt not ready for discharge due to: Not medically ready  SW will remain available for families in [facility].  Currently pt has d/c plans in progress at this time.     Magda Tovar LCSW  Case Management/[facility]  [phone]

## 2022-11-01 NOTE — PROGRESS NOTES
[facility]  Colorectal Surgery  Progress Note    Patient Name: Javier Downs  MRN: [id number]  Admission Date: 10/20/2022  Hospital Length of Stay: 12 days  Attending Physician: EDILMA Bonilla MD    Subjective:     Interval History: no acute events overnite, eatging well, afebrile, having soft stools    Post-Op Info:  Procedure(s) (LRB):  HEMICOLECTOMY, RIGHT, extended (N/A)  OMENTECTOMY (N/A)   12 Days Post-Op      Medications:  Continuous Infusions:  Scheduled Meds:   amLODIPine  10 mg Oral Daily    enoxaparin  30 mg Subcutaneous Daily    methocarbamoL  500 mg Oral TID    sodium chloride 0.9%  10 mL Intravenous Q6H    tamsulosin  0.8 mg Oral Daily     PRN Meds:   HYDROmorphone    iohexol    iohexol    naloxone    oxyCODONE    sodium chloride 0.9%    traMADoL        Objective:     Vital Signs (Most Recent):  Temp: 98.1 °F (36.7 °C) (11/01/22 1151)  Pulse: 98 (11/01/22 1151)  Resp: 16 (11/01/22 1151)  BP: 100/65 (11/01/22 1151)  SpO2: (!) 93 % (11/01/22 1151)   Vital Signs (24h Range):  Temp:  [98.1 °F (36.7 °C)-99.6 °F (37.6 °C)] 98.1 °F (36.7 °C)  Pulse:  [] 98  Resp:  [16-20] 16  SpO2:  [93 %-99 %] 93 %  BP: (100-120)/(61-72) 100/65     Intake/Output - Last 3 Shifts         10/30 0700  10/31 0659 10/31 0700  11/01 0659 11/01 0700 11/02 0659    P.O. 740 600     I.V. (mL/kg) 402.4 (8.4)      .8      Total Intake(mL/kg) 1926.2 (40.2) 600 (12.5)     Urine (mL/kg/hr) 2160 (1.9) 200 (0.2)     Emesis/NG output 0 0     Other 0 0     Stool 0 0     Total Output 2160 200     Net -233.8 +400            Urine Occurrence 4 x 1 x     Stool Occurrence 3 x 1 x     Emesis Occurrence 0 x 0 x             Physical Exam  Vitals and nursing note reviewed.   Constitutional:       Appearance: He is well-developed. He is not ill-appearing.   Cardiovascular:      Rate and Rhythm: Normal rate and regular rhythm.      Heart sounds: Normal heart sounds.   Pulmonary:      Effort: Pulmonary effort is normal. No  respiratory distress.      Breath sounds: Normal breath sounds. No wheezing or rales.   Abdominal:      General: There is no distension.      Palpations: Abdomen is soft. There is no mass.      Tenderness: There is no abdominal tenderness. There is no guarding.      Comments: Abd inc line healing well   Musculoskeletal:         General: Normal range of motion.   Skin:     General: Skin is warm and dry.   Neurological:      Mental Status: He is alert and oriented to person, place, and time.   Psychiatric:         Behavior: Behavior normal.         Thought Content: Thought content normal.         Judgment: Judgment normal.           Significant Labs:  BMP (Last 3 Results):   Recent Labs   Lab 10/30/22  0519 10/31/22  0602 11/01/22  0444    85 77    133* 132*   K 3.9 4.0 3.8   CL 99 99 98   CO2 26 27 27   BUN 5* 8 10   CREATININE 0.6 0.6 0.6   CALCIUM 8.6* 8.8 8.4*   MG 1.9 1.8 1.7     CBC (Last 3 Results):   Recent Labs   Lab 10/30/22  0519 10/31/22  0602 11/01/22  0444   WBC 8.02 10.87 11.02   RBC 2.81* 2.94* 2.76*   HGB 8.0* 8.4* 7.8*   HCT 26.0* 26.5* 24.9*    468* 395   MCV 93 90 90   MCH 28.5 28.6 28.3   MCHC 30.8* 31.7* 31.3*       Significant Diagnostics:  CT a/p tpday    Assessment/Plan:     Microcytic anemia  monitor labs  Monitor vs    Hypertension  Chronic, controlled.  Latest blood pressure and vitals reviewed-   Temp:  [98.1 °F (36.7 °C)-99.6 °F (37.6 °C)]   Pulse:  []   Resp:  [16-20]   BP: (100-111)/(65-72)   SpO2:  [93 %-98 %] .   Home meds for hypertension were reviewed and noted below.   Hypertension Medications             amLODIPine (NORVASC) 10 MG tablet Take 1 tablet (10 mg total) by mouth once daily.          While in the hospital, will manage blood pressure as follows; Continue home antihypertensive regimen    Will utilize p.r.n. blood pressure medication only if patient's blood pressure greater than  180/110 and he develops symptoms such as worsening chest pain or  shortness of breath.    Colonic mass  Javier Martinez is a 72 y.o. M s/p extended right hemicolectomy 10/20. Patient has return of bowel function. Improved RLQ pain with now downtrending CRP. Pain is otherwise well controlled and incisions are clean, dry, and intact.      Plan:   - eating well, tpn cyndee  - advance to FLD  - continue mIVF  - continue PRN pain and nausea meds  - Flomax    - PT/OT            Elsy Kelly NP  Colorectal Surgery  [facility]

## 2022-11-01 NOTE — SUBJECTIVE & OBJECTIVE
Subjective:     Interval History: no acute events overnite, eatging well, afebrile, having soft stools    Post-Op Info:  Procedure(s) (LRB):  HEMICOLECTOMY, RIGHT, extended (N/A)  OMENTECTOMY (N/A)   12 Days Post-Op      Medications:  Continuous Infusions:  Scheduled Meds:   amLODIPine  10 mg Oral Daily    enoxaparin  30 mg Subcutaneous Daily    methocarbamoL  500 mg Oral TID    sodium chloride 0.9%  10 mL Intravenous Q6H    tamsulosin  0.8 mg Oral Daily     PRN Meds:   HYDROmorphone    iohexol    iohexol    naloxone    oxyCODONE    sodium chloride 0.9%    traMADoL        Objective:     Vital Signs (Most Recent):  Temp: 98.1 °F (36.7 °C) (11/01/22 1151)  Pulse: 98 (11/01/22 1151)  Resp: 16 (11/01/22 1151)  BP: 100/65 (11/01/22 1151)  SpO2: (!) 93 % (11/01/22 1151)   Vital Signs (24h Range):  Temp:  [98.1 °F (36.7 °C)-99.6 °F (37.6 °C)] 98.1 °F (36.7 °C)  Pulse:  [] 98  Resp:  [16-20] 16  SpO2:  [93 %-99 %] 93 %  BP: (100-120)/(61-72) 100/65     Intake/Output - Last 3 Shifts         10/30 0700  10/31 0659 10/31 0700  11/01 0659 11/01 0700  11/02 0659    P.O. 740 600     I.V. (mL/kg) 402.4 (8.4)      .8      Total Intake(mL/kg) 1926.2 (40.2) 600 (12.5)     Urine (mL/kg/hr) 2160 (1.9) 200 (0.2)     Emesis/NG output 0 0     Other 0 0     Stool 0 0     Total Output 2160 200     Net -233.8 +400            Urine Occurrence 4 x 1 x     Stool Occurrence 3 x 1 x     Emesis Occurrence 0 x 0 x             Physical Exam  Vitals and nursing note reviewed.   Constitutional:       Appearance: He is well-developed. He is not ill-appearing.   Cardiovascular:      Rate and Rhythm: Normal rate and regular rhythm.      Heart sounds: Normal heart sounds.   Pulmonary:      Effort: Pulmonary effort is normal. No respiratory distress.      Breath sounds: Normal breath sounds. No wheezing or rales.   Abdominal:      General: There is no distension.      Palpations: Abdomen is soft. There is no mass.      Tenderness: There is  no abdominal tenderness. There is no guarding.      Comments: Abd inc line healing well   Musculoskeletal:         General: Normal range of motion.   Skin:     General: Skin is warm and dry.   Neurological:      Mental Status: He is alert and oriented to person, place, and time.   Psychiatric:         Behavior: Behavior normal.         Thought Content: Thought content normal.         Judgment: Judgment normal.           Significant Labs:  BMP (Last 3 Results):   Recent Labs   Lab 10/30/22  0519 10/31/22  0602 11/01/22  0444    85 77    133* 132*   K 3.9 4.0 3.8   CL 99 99 98   CO2 26 27 27   BUN 5* 8 10   CREATININE 0.6 0.6 0.6   CALCIUM 8.6* 8.8 8.4*   MG 1.9 1.8 1.7     CBC (Last 3 Results):   Recent Labs   Lab 10/30/22  0519 10/31/22  0602 11/01/22  0444   WBC 8.02 10.87 11.02   RBC 2.81* 2.94* 2.76*   HGB 8.0* 8.4* 7.8*   HCT 26.0* 26.5* 24.9*    468* 395   MCV 93 90 90   MCH 28.5 28.6 28.3   MCHC 30.8* 31.7* 31.3*       Significant Diagnostics:  CT a/p tpday

## 2022-11-02 ENCOUNTER — TELEPHONE (OUTPATIENT)
Dept: INTERVENTIONAL RADIOLOGY/VASCULAR | Facility: CLINIC | Age: 72
End: 2022-11-02
Payer: MEDICARE

## 2022-11-02 ENCOUNTER — TELEPHONE (OUTPATIENT)
Dept: SURGERY | Facility: CLINIC | Age: 72
End: 2022-11-02
Payer: MEDICARE

## 2022-11-02 VITALS
DIASTOLIC BLOOD PRESSURE: 61 MMHG | HEIGHT: 70 IN | BODY MASS INDEX: 15.12 KG/M2 | SYSTOLIC BLOOD PRESSURE: 100 MMHG | HEART RATE: 96 BPM | OXYGEN SATURATION: 96 % | TEMPERATURE: 97 F | RESPIRATION RATE: 20 BRPM | WEIGHT: 105.63 LBS

## 2022-11-02 DIAGNOSIS — C18.9 COLON CANCER METASTASIZED TO LIVER: Primary | ICD-10-CM

## 2022-11-02 DIAGNOSIS — C78.7 COLON CANCER METASTASIZED TO LIVER: Primary | ICD-10-CM

## 2022-11-02 LAB
ALBUMIN SERPL BCP-MCNC: 1.9 G/DL (ref 3.5–5.2)
ALP SERPL-CCNC: 127 U/L (ref 55–135)
ALT SERPL W/O P-5'-P-CCNC: 17 U/L (ref 10–44)
ANION GAP SERPL CALC-SCNC: 7 MMOL/L (ref 8–16)
AST SERPL-CCNC: 62 U/L (ref 10–40)
BASOPHILS # BLD AUTO: 0.03 K/UL (ref 0–0.2)
BASOPHILS NFR BLD: 0.3 % (ref 0–1.9)
BILIRUB SERPL-MCNC: 0.5 MG/DL (ref 0.1–1)
BUN SERPL-MCNC: 12 MG/DL (ref 8–23)
CALCIUM SERPL-MCNC: 8.5 MG/DL (ref 8.7–10.5)
CHLORIDE SERPL-SCNC: 96 MMOL/L (ref 95–110)
CO2 SERPL-SCNC: 27 MMOL/L (ref 23–29)
CREAT SERPL-MCNC: 0.7 MG/DL (ref 0.5–1.4)
CRP SERPL-MCNC: 231.2 MG/L (ref 0–8.2)
DIFFERENTIAL METHOD: ABNORMAL
EOSINOPHIL # BLD AUTO: 0.1 K/UL (ref 0–0.5)
EOSINOPHIL NFR BLD: 1.5 % (ref 0–8)
ERYTHROCYTE [DISTWIDTH] IN BLOOD BY AUTOMATED COUNT: 15.8 % (ref 11.5–14.5)
EST. GFR  (NO RACE VARIABLE): >60 ML/MIN/1.73 M^2
GLUCOSE SERPL-MCNC: 117 MG/DL (ref 70–110)
HCT VFR BLD AUTO: 24 % (ref 40–54)
HGB BLD-MCNC: 7.8 G/DL (ref 14–18)
IMM GRANULOCYTES # BLD AUTO: 0.03 K/UL (ref 0–0.04)
IMM GRANULOCYTES NFR BLD AUTO: 0.3 % (ref 0–0.5)
LYMPHOCYTES # BLD AUTO: 2.1 K/UL (ref 1–4.8)
LYMPHOCYTES NFR BLD: 22.7 % (ref 18–48)
MAGNESIUM SERPL-MCNC: 1.9 MG/DL (ref 1.6–2.6)
MCH RBC QN AUTO: 29 PG (ref 27–31)
MCHC RBC AUTO-ENTMCNC: 32.5 G/DL (ref 32–36)
MCV RBC AUTO: 89 FL (ref 82–98)
MONOCYTES # BLD AUTO: 1.8 K/UL (ref 0.3–1)
MONOCYTES NFR BLD: 18.9 % (ref 4–15)
NEUTROPHILS # BLD AUTO: 5.2 K/UL (ref 1.8–7.7)
NEUTROPHILS NFR BLD: 56.3 % (ref 38–73)
NRBC BLD-RTO: 0 /100 WBC
PHOSPHATE SERPL-MCNC: 3.3 MG/DL (ref 2.7–4.5)
PLATELET # BLD AUTO: 426 K/UL (ref 150–450)
PMV BLD AUTO: 9.1 FL (ref 9.2–12.9)
POTASSIUM SERPL-SCNC: 3.7 MMOL/L (ref 3.5–5.1)
PROT SERPL-MCNC: 5.9 G/DL (ref 6–8.4)
RBC # BLD AUTO: 2.69 M/UL (ref 4.6–6.2)
SODIUM SERPL-SCNC: 130 MMOL/L (ref 136–145)
WBC # BLD AUTO: 9.31 K/UL (ref 3.9–12.7)

## 2022-11-02 PROCEDURE — 94761 N-INVAS EAR/PLS OXIMETRY MLT: CPT

## 2022-11-02 PROCEDURE — 85025 COMPLETE CBC W/AUTO DIFF WBC: CPT | Performed by: STUDENT IN AN ORGANIZED HEALTH CARE EDUCATION/TRAINING PROGRAM

## 2022-11-02 PROCEDURE — 80053 COMPREHEN METABOLIC PANEL: CPT | Performed by: STUDENT IN AN ORGANIZED HEALTH CARE EDUCATION/TRAINING PROGRAM

## 2022-11-02 PROCEDURE — 25000003 PHARM REV CODE 250: Performed by: STUDENT IN AN ORGANIZED HEALTH CARE EDUCATION/TRAINING PROGRAM

## 2022-11-02 PROCEDURE — 25000003 PHARM REV CODE 250

## 2022-11-02 PROCEDURE — 83735 ASSAY OF MAGNESIUM: CPT | Performed by: STUDENT IN AN ORGANIZED HEALTH CARE EDUCATION/TRAINING PROGRAM

## 2022-11-02 PROCEDURE — 86140 C-REACTIVE PROTEIN: CPT | Performed by: STUDENT IN AN ORGANIZED HEALTH CARE EDUCATION/TRAINING PROGRAM

## 2022-11-02 PROCEDURE — 25000003 PHARM REV CODE 250: Performed by: COLON & RECTAL SURGERY

## 2022-11-02 PROCEDURE — A4216 STERILE WATER/SALINE, 10 ML: HCPCS | Performed by: COLON & RECTAL SURGERY

## 2022-11-02 PROCEDURE — 84100 ASSAY OF PHOSPHORUS: CPT | Performed by: STUDENT IN AN ORGANIZED HEALTH CARE EDUCATION/TRAINING PROGRAM

## 2022-11-02 RX ORDER — METHOCARBAMOL 500 MG/1
500 TABLET, FILM COATED ORAL 3 TIMES DAILY
Qty: 90 TABLET | Refills: 0 | Status: SHIPPED | OUTPATIENT
Start: 2022-11-02 | End: 2023-02-02 | Stop reason: SDUPTHER

## 2022-11-02 RX ORDER — OXYCODONE HYDROCHLORIDE 5 MG/1
5 TABLET ORAL EVERY 6 HOURS PRN
Qty: 20 TABLET | Refills: 0 | Status: SHIPPED | OUTPATIENT
Start: 2022-11-02

## 2022-11-02 RX ADMIN — Medication 10 ML: at 12:11

## 2022-11-02 RX ADMIN — METHOCARBAMOL 500 MG: 500 TABLET ORAL at 09:11

## 2022-11-02 RX ADMIN — TAMSULOSIN HYDROCHLORIDE 0.8 MG: 0.4 CAPSULE ORAL at 09:11

## 2022-11-02 RX ADMIN — Medication 10 ML: at 05:11

## 2022-11-02 NOTE — NURSING
Pt resting,. Consumed 100% of meal brought in by wife castillo well, no c/o N/V no c/o pain or discomfort will cont to observe

## 2022-11-02 NOTE — NURSING
Blood work obtained via right upper arm piccsent to lab no distress pt resting well, wife at bedside

## 2022-11-02 NOTE — DISCHARGE SUMMARY
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  Primary tumor palliatively treated with endoluminal stent.  Pt underwent chemotherapy for 18 months and has developed recurrent large bowel obstruction at primary site due to regrowth.  Liver lesions experienced downstaging and may be amenable to resection.  Pt discussed at MDT and stage resection recommended.  Initially he was doing well, adequate pain control and tolerating small amt of diet and pasing flatus but on PO 4 he developed a post op ileus requiring a NGT.  Due to prolonged NPO status PICC placed and begun on TPN for nutritional support.  Inc line healing well and he slwoly imroved.  On 10/29 after having bm with flatus NGT removed and diet slwoly advanced.  10/30 he had a low grade temp, appetite improving,  CT done 11/1 showed ileus resolved but worseing liver mets and occlusion of portal vein.  On day of discharge pt is castillo regular diet, inc line healing well, positive for bm with flatus, ambulating in perez without difficulty, adequate pain control with oral medication, VS stable and afebrile.     He was seen by IR while in hospital and IR will be scheulded per IR fellow Dr. Humphrey Kirkland for a Y90 mapping procedure, 2 weeks fu with Dr. Bonilla and 4 week fu with Dr. Rodriguez      Goals of Care Treatment Preferences:  Code Status: Full Code      Consults (From admission, onward)        Status Ordering Provider     Inpatient consult to Interventional Radiology  Once        Provider:   (Not yet assigned)    Completed DEISY ROSE     Inpatient consult to Registered Dietitian/Nutritionist  Once        Provider:  (Not yet assigned)    Completed YADY TORRES     Inpatient consult to PICC team (LEONEL)  Once        Provider:  (Not yet assigned)    Completed YADY TORRES          Significant Diagnostic Studies: Labs:   BMP:   Recent Labs   Lab 11/01/22  0444 11/02/22 0447   GLU 77 117*   * 130*   K 3.8 3.7   CL 98 96   CO2 27 27   BUN 10 12   CREATININE 0.6 0.7   CALCIUM 8.4* 8.5*   MG 1.7 1.9    and CBC   Recent Labs   Lab 11/01/22  0444 11/02/22  0447   WBC 11.02 9.31   HGB 7.8* 7.8*   HCT 24.9* 24.0*    426       Pending Diagnostic Studies:     None        Final Active Diagnoses:    Diagnosis Date Noted POA    PRINCIPAL PROBLEM:  Colon cancer metastasized to liver [C18.9, C78.7] 05/03/2021 Yes    Colonic mass [K63.89] 04/17/2021 Yes    Hypertension [I10]  Yes    Microcytic anemia [D50.9]  Yes      Problems Resolved During this Admission:      Discharged Condition: good    Disposition: Home or Self Care    Follow Up:   Follow-up Information     H Jared Bonilla MD Follow up in 2 week(s).    Specialty: Colon and Rectal Surgery  Contact information:  [address] [address]  [phone]             Abhi Rodriguez MD Follow up in 4 week(s).    Specialty: Surgical Oncology  Contact information:  [address] [facility] [address]  [phone]                       Patient Instructions:      Diet Adult Regular   Order Comments: Low fiber, no fresh fruit or fresh vegetables, no nuts, grapes, popcorn or raisins     Lifting restrictions   Order Comments: No lifting anything greater than 5 pounds     No dressing needed   Order Comments: May shower, no tub bath     Notify your health care provider if you experience any of the following:  temperature >100.4     Notify your health care provider if you experience any of the following:  persistent nausea and vomiting  or diarrhea     Notify your health care provider if you experience any of the following:  severe uncontrolled pain     Notify your health care provider if you experience any of the following:  redness, tenderness, or signs of infection (pain, swelling, redness, odor or green/yellow discharge around incision site)     Notify your health care provider if you experience any of the following:  difficulty breathing or increased cough     Notify your health care provider if you experience any of the following:  severe persistent headache     Notify your health care provider if you experience any of the following:  worsening rash     Notify your health care provider if you experience any of the following:  persistent dizziness, light-headedness, or visual disturbances     Notify your health care provider if you experience any of the following:  increased confusion or weakness     Medications:  Reconciled Home Medications:      Medication List      START taking these medications    methocarbamoL 500 MG Tab  Commonly known as: ROBAXIN  Take 1 tablet (500 mg total) by mouth 3 (three) times daily.     oxyCODONE 5 MG immediate release tablet  Commonly known as: ROXICODONE  Take 1 tablet (5 mg total) by mouth every 6 (six) hours as needed for Pain.        CONTINUE taking these medications    acetaminophen 500 MG tablet  Commonly known as: TYLENOL  Take 1 tablet (500 mg total) by mouth every 6 (six) hours as needed for Pain (alternate with ibuprofen).     amLODIPine 10 MG tablet  Commonly known as: NORVASC  Take 1 tablet (10 mg total) by mouth once daily.     ibuprofen 400 MG tablet  Commonly known as: ADVIL,MOTRIN  Take 1 tablet (400 mg total) by mouth every 6 (six) hours as needed for Other (pain). Alternate with tylenol     ondansetron 4 MG tablet  Commonly known as: ZOFRAN  Take 1 tablet (4 mg total) by mouth every 8 (eight) hours as needed for Nausea.        STOP taking these medications    ciprofloxacin HCl 500 MG  tablet  Commonly known as: CIPRO     LIDOcaine-prilocaine cream  Commonly known as: EMLA     metroNIDAZOLE 500 MG tablet  Commonly known as: FLAGYL     polyethylene glycol [id number] -5.86 gram suspension  Commonly known as: Ncio Kelly NP  Colorectal Surgery  [facility]

## 2022-11-02 NOTE — PLAN OF CARE
[facility]  Discharge Final Note    Primary Care Provider: Renetta García MD    Expected Discharge Date: 11/2/2022    Final Discharge Note (most recent)       Final Note - 11/02/22 1156          Final Note    Assessment Type Final Discharge Note     Anticipated Discharge Disposition Home or Self Care     Hospital Resources/Appts/Education Provided Provided patient/caregiver with written discharge plan information        Post-Acute Status    Discharge Delays None known at this time                     Important Message from Medicare  Important Message from Medicare regarding Discharge Appeal Rights: Given to patient/caregiver, Explained to patient/caregiver, Signed/date by patient/caregiver     Date IMM was signed: 11/02/22  Time IMM was signed: 0938    Contact Info       H Jared Bonilla MD   Specialty: Colon and Rectal Surgery    [address] [facility] [address]   Phone: [phone]       Next Steps: Follow up in 2 week(s)    Abhi Rodriguez MD   Specialty: Surgical Oncology    [address] [facility] [address]   Phone: [phone]       Next Steps: Follow up in 4 week(s)          Pt to d/c home with family. No d/c needs reported by medial team. Follow-up appointments to be schedule via phone call with the pt.     Magda Tovar LCSW  Case Management/[facility]  [phone]       12:38 PM  Follow up with Dr. Rodriguez on 11/30/22 at 9am.       Magda Tovar LCSW  Case Management/[facility]  [phone]

## 2022-11-02 NOTE — CONSULTS
Vascular and Interventional Radiology Consult      Date: 11/2/2022   Primary team: Networked reference to record PCT , EDILMA Bonilla MD   Room/bed: 1025/1025 A    Reason for Consult:   Metastatic colorectal cancer    History of Present Illness:  Javier Downs is a 72 y.o. male with history of colorectal cancer s/p hemicolectomy, with large right hepatic lobe metastatic disease burden. IR evaluation for locoregional therapy requested.     Past Medical History:  Past Medical History:   Diagnosis Date    MARIA A (acute kidney injury) 8/18/2022    Hypertension     Metastatic colon cancer to liver 4/22/2021       Past Surgical History:  Past Surgical History:   Procedure Laterality Date    COLONOSCOPY N/A 4/20/2021    Procedure: COLONOSCOPY with possible stent;  Surgeon: EDILMA Bonilla MD;  Location: [facility] ENDO (2ND FLR);  Service: Colon and Rectal;  Laterality: N/A;    DIAGNOSTIC LAPAROSCOPY N/A 9/7/2022    Procedure: LAPAROSCOPY, DIAGNOSTIC;  Surgeon: Adrian Rodriguez MD;  Location: [facility] OR 2ND FLR;  Service: General;  Laterality: N/A;    INSERTION OF TUNNELED CENTRAL VENOUS CATHETER (CVC) WITH SUBCUTANEOUS PORT N/A 5/3/2021    Procedure: [id number];  Surgeon: Francisco Layton MD;  Location: [facility] OR 2ND FLR;  Service: Vascular;  Laterality: N/A;    OMENTECTOMY N/A 10/20/2022    Procedure: OMENTECTOMY;  Surgeon: EDILMA Bonilla MD;  Location: [facility] OR 2ND FLR;  Service: Colon and Rectal;  Laterality: N/A;    RIGHT HEMICOLECTOMY N/A 10/20/2022    Procedure: HEMICOLECTOMY, RIGHT, extended;  Surgeon: EDILMA Bonilla MD;  Location: [facility] OR 2ND FLR;  Service: Colon and Rectal;  Laterality: N/A;  Extended right hemicolectomy CONSENT IN AM        Sedation History:    No known adverse reactions.     Social History:  Social History     Tobacco Use    Smoking status: Never    Smokeless tobacco: Never   Substance Use Topics    Alcohol use: Yes        Home Medications:   Prior to Admission medications    Medication Sig  Start Date End Date Taking? Authorizing Provider   acetaminophen (TYLENOL) 500 MG tablet Take 1 tablet (500 mg total) by mouth every 6 (six) hours as needed for Pain (alternate with ibuprofen). 5/3/21  Yes FRANKLIN Delarosa MD   amLODIPine (NORVASC) 10 MG tablet Take 1 tablet (10 mg total) by mouth once daily. 8/3/22 1/30/23 Yes Anali Hernandez PA-C   ciprofloxacin HCl (CIPRO) 500 MG tablet Take 1 tablet (500 mg total) by mouth 2 (two) times daily. 10/18/22  Yes Elsy Kelly NP   ibuprofen (ADVIL,MOTRIN) 400 MG tablet Take 1 tablet (400 mg total) by mouth every 6 (six) hours as needed for Other (pain). Alternate with tylenol 5/3/21  Yes FRANKLIN Delarosa MD   metroNIDAZOLE (FLAGYL) 500 MG tablet Take 1 tablet (500 mg total) by mouth 2 (two) times daily. 10/4/22  Yes Mylene Bravo NP   ondansetron (ZOFRAN) 4 MG tablet Take 1 tablet (4 mg total) by mouth every 8 (eight) hours as needed for Nausea. 4/21/21  Yes Thomas Monroe MD   ciprofloxacin HCl (CIPRO) 500 MG tablet Take 1 tablet (500 mg total) by mouth 2 (two) times daily. 10/4/22   Mylene Bravo NP   LIDOcaine-prilocaine (EMLA) cream Apply topically as needed (Apply one hour before treatment). 11/4/21   Austin Contreras MD   metroNIDAZOLE (FLAGYL) 500 MG tablet Take 1 tablet (500 mg total) by mouth 2 (two) times daily. 10/18/22   Elys Kelly NP       Inpatient Medications:    Current Facility-Administered Medications:     amLODIPine tablet 10 mg, 10 mg, Oral, Daily, Florentin Coto MD, 10 mg at 11/01/22 0933    enoxaparin injection 30 mg, 30 mg, Subcutaneous, Daily, Stephanie Jimenez MD, 30 mg at 11/01/22 1625    HYDROmorphone injection 0.2 mg, 0.2 mg, Intravenous, Q4H PRN, Blanka Horvath MD, 0.2 mg at 10/23/22 2208    iohexol (OMNIPAQUE) oral solution 15 mL, 15 mL, Oral, PRN, Michelle Enriquez MD, 15 mL at 11/01/22 1308    iohexol (OMNIPAQUE) oral solution 15 mL, 15 mL, Oral, PRN, Michelle Enriquez MD    methocarbamoL tablet 500 mg, 500 mg, Oral, TID, Blanka Horvath MD, 500  mg at 11/02/22 0923    naloxone 0.4 mg/mL injection 0.02 mg, 0.02 mg, Intravenous, PRN, Florentin Coto MD    ondansetron injection 8 mg, 8 mg, Intravenous, Q6H PRN, Elsy Kelly NP, 8 mg at 11/01/22 1435    oxyCODONE immediate release tablet 5 mg, 5 mg, Oral, Q6H PRN, Jose Gore MD, 5 mg at 10/28/22 2021    Flushing PICC Protocol, , , Until Discontinued **AND** sodium chloride 0.9% flush 10 mL, 10 mL, Intravenous, Q6H, 10 mL at 11/02/22 0516 **AND** sodium chloride 0.9% flush 10 mL, 10 mL, Intravenous, PRN, H. Jared Bonilla MD    tamsulosin 24 hr capsule 0.8 mg, 0.8 mg, Oral, Daily, Florentin Coto MD, 0.8 mg at 11/02/22 0923    traMADoL tablet 50 mg, 50 mg, Oral, Q6H PRN, Jose Gore MD, 50 mg at 10/28/22 2021     Anticoagulants/Antiplatelets:   Lovenox    Allergies:   Review of patient's allergies indicates:  No Known Allergies    Review of Systems:   As documented in primary provider H&P.    Vital Signs:  Temp: 99 °F (37.2 °C) (11/02/22 0736)  Pulse: 96 (11/02/22 0736)  Resp: 20 (11/02/22 0736)  BP: (!) 107/56 (11/02/22 0736)  SpO2: 95 % (11/02/22 0736)    Temp:  [97.3 °F (36.3 °C)-99.3 °F (37.4 °C)]   Pulse:  []   Resp:  [16-20]   BP: ()/(56-71)   SpO2:  [93 %-98 %]      Physical Exam:  No acute distress, laying comfortably in bed, pleasant and cooperative  Regular rate and rhythm  Breathing unlabored  Abdomen benign  Extremities warm and well perfused    Sedation Exam:  ASA: III - Patient appears to have severe systemic disease not posing a constant threat to life  Mallampati score: II (hard and soft palate, upper portion of tonsils anduvula visible)    Laboratory:  Lab Results   Component Value Date    INR 3.3 07/07/2021       Lab Results   Component Value Date    WBC 9.31 11/02/2022    HGB 7.8 (L) 11/02/2022    HCT 24.0 (L) 11/02/2022    MCV 89 11/02/2022     11/02/2022      Lab Results   Component Value Date     (H) 11/02/2022     (L) 11/02/2022    K 3.7 11/02/2022    CL 96  11/02/2022    CO2 27 11/02/2022    BUN 12 11/02/2022    CREATININE 0.7 11/02/2022    CALCIUM 8.5 (L) 11/02/2022    MG 1.9 11/02/2022    ALT 17 11/02/2022    AST 62 (H) 11/02/2022    ALBUMIN 1.9 (L) 11/02/2022    BILITOT 0.5 11/02/2022       Imaging:   Reviewed.      ASSESSMENT/PLAN/RECOMMENDATIONS:   72M with metastatic colorectal cancer s/p extended right hemicolectomy. IR evaluation for locoregional therapy requested.     Patient seen at bedside this AM. Y90 radioembolization procedure discussed in extensive detail with patientl, family at bedside, and his wife via telephone.     Will plan for outpatient Y90 mapping procedure on an urgent basis with moderate intravenous conscious sedation. Patient  is agreeable with proceeding. All questions answered and clinic telephone number was provided. Our scheduling team will reach out to patient for further treatment coordination.     Humphrey Kirkland MD  Fellow, Dept. Of Interventional Radiology  [facility]

## 2022-11-02 NOTE — PLAN OF CARE
Problem: Fluid and Electrolyte Imbalance (Acute Kidney Injury/Impairment)  Goal: Fluid and Electrolyte Balance  Outcome: Ongoing, Progressing     Problem: Oral Intake Inadequate (Acute Kidney Injury/Impairment)  Goal: Optimal Nutrition Intake  Outcome: Ongoing, Progressing     Problem: Adult Inpatient Plan of Care  Goal: Absence of Hospital-Acquired Illness or Injury  Outcome: Ongoing, Progressing     Problem: Infection  Goal: Absence of Infection Signs and Symptoms  Outcome: Ongoing, Progressing     Problem: Fall Injury Risk  Goal: Absence of Fall and Fall-Related Injury  Outcome: Ongoing, Progressing     Problem: Skin Injury Risk Increased  Goal: Skin Health and Integrity  Outcome: Ongoing, Progressing

## 2022-11-02 NOTE — PLAN OF CARE
Plan of care reviewed with pt:  -Pt has been frustrated today, stated that he wants to go home  -CT scan done this afternoon  -AAOx4, on RA, VS stable -afebrile  -ABD incision with DB CDI VASHTI  -Had no BM today  -No appetite today, pt said he does not like hospital food, refused the Boost, only had a beef broth  -Voided with adequate  amount yellow urine( 450cc), had 2 unmeasured urine occurences  -ambulated in the room and hallway independently  -Denied pain  -Call light in reach, [facility]

## 2022-11-02 NOTE — TELEPHONE ENCOUNTER
----- Message from Yessenia Ospina RN sent at 11/2/2022 12:20 PM CDT -----  Contact: [facility]    [id number]  You can take him if you could.  Looks like since his bx was negative NB may want to discuss surgical options.    Crissy  ----- Message -----  From: Esther Lee RN  Sent: 11/2/2022  12:11 PM CDT  To: Yessenia Ospina RN    I think you were following him. Do you need me to make the appt? Looks like discharge instructions indicated for him to follow up in 4 weeks?  ----- Message -----  From: Alice Storey RN  Sent: 11/2/2022  11:59 AM CDT  To: Michael Campbell Staff      ----- Message -----  From: Ashlee Botello  Sent: 11/2/2022  11:56 AM CDT  To: Michael ADHIKARI Staff    Calling to schedule hospital f/u PO appt.  Pls call pt with an appt.

## 2022-11-02 NOTE — HOSPITAL COURSE
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  Primary tumor palliatively treated with endoluminal stent.  Pt underwent chemotherapy for 18 months and has developed recurrent large bowel obstruction at primary site due to regrowth.  Liver lesions experienced downstaging and may be amenable to resection.  Pt discussed at MDT and stage resection recommended.  Initially he was doing well, adequate pain control and tolerating small amt of diet and pasing flatus but on PO 4 he developed a post op ileus requiring a NGT.  Due to prolonged NPO status PICC placed and begun on TPN for nutritional support.  Inc line healing well and he slwoly imroved.  On 10/29 after having bm with flatus NGT removed and diet slwoly advanced.  10/30 he had a low grade temp, appetite improving,  CT done 11/1 showed ileus resolved but worseing liver mets and occlusion of portal vein.  On day of discharge pt is castillo regular diet, inc line healing well, positive for bm with flatus, ambulating in perez without difficulty, adequate pain control with oral medication, VS stable and afebrile.     He was seen by IR while in hospital and IR will be scheulded per IR fellow Dr. Humphrey Kirkland for a Y90 mapping procedure, 2 weeks fu with Dr. Bonilla and 4 week fu with Dr. Rodriguez

## 2022-11-02 NOTE — PLAN OF CARE
SSC met with patient/family at bedside. Patient experience rounding completed and reviewed the following.     Do you know your discharge plan? Yes or No,    If yes, what is the plan? (Home, Home Health, Rehab, SNF, LTAC, or Other)      Home       If you are discharging home, do you have help at home? Yes or No      Yes    Do you think you will need help at home at discharge? Yes or No    No    Have you discussed your needs and preferences with your SW/CM? Yes or No    Yes    Assigned SW/CM notified of any patient/family needs or concerns.     Magda Yung  [facility]  [phone]

## 2022-11-02 NOTE — PROGRESS NOTES
Discharge instructions and AVS given to and reviewed with patient and family. Patient verbalized understanding. Lines removed. Medications delivered to bedside. Awaiting transport

## 2022-11-03 ENCOUNTER — DOCUMENTATION ONLY (OUTPATIENT)
Dept: HEMATOLOGY/ONCOLOGY | Facility: CLINIC | Age: 72
End: 2022-11-03
Payer: MEDICARE

## 2022-11-03 ENCOUNTER — PATIENT OUTREACH (OUTPATIENT)
Dept: ADMINISTRATIVE | Facility: CLINIC | Age: 72
End: 2022-11-03
Payer: MEDICARE

## 2022-11-03 NOTE — PROGRESS NOTES
[facility]     Colorectal Liver Metastasis Tumor Board Note      Date: 11/03/2022    Case Overview: Mr. 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 He has received FOLFOX + dwight from May to July 2021 and has been on maintenance 5-FU and dwight since August 2021. Imaging also revealed hemangioma in liver without convincing evidence of extrahepatic disease.     Participants: medical oncology, surgical oncology, transplant surgery, interventional radiology, oncology navigation    Imaging Reviewed: CT A/P 11/2022    Radiology Review: n/a    Orders/Referrals: Y-90     Board Recommendations: Most recent imaging revealed worsening of liver disease/hemangioma. Mr. Downs would benefit from rapid Y-90 (scheduled for mapping in [place] on 11/8). We recommend re-initiation of systemic treatment at earliest availability.

## 2022-11-07 ENCOUNTER — LAB VISIT (OUTPATIENT)
Dept: LAB | Facility: HOSPITAL | Age: 72
End: 2022-11-07
Attending: FAMILY MEDICINE
Payer: MEDICARE

## 2022-11-07 ENCOUNTER — TELEPHONE (OUTPATIENT)
Dept: INTERVENTIONAL RADIOLOGY/VASCULAR | Facility: HOSPITAL | Age: 72
End: 2022-11-07
Payer: MEDICARE

## 2022-11-07 DIAGNOSIS — C18.9 COLON CANCER METASTASIZED TO LIVER: ICD-10-CM

## 2022-11-07 DIAGNOSIS — C78.7 COLON CANCER METASTASIZED TO LIVER: ICD-10-CM

## 2022-11-07 LAB
ALBUMIN SERPL BCP-MCNC: 2.2 G/DL (ref 3.5–5.2)
ALP SERPL-CCNC: 129 U/L (ref 55–135)
ALT SERPL W/O P-5'-P-CCNC: 13 U/L (ref 10–44)
ANION GAP SERPL CALC-SCNC: 11 MMOL/L (ref 8–16)
AST SERPL-CCNC: 34 U/L (ref 10–40)
BASOPHILS # BLD AUTO: 0.06 K/UL (ref 0–0.2)
BASOPHILS NFR BLD: 0.5 % (ref 0–1.9)
BILIRUB DIRECT SERPL-MCNC: 0.2 MG/DL (ref 0.1–0.3)
BILIRUB SERPL-MCNC: 0.5 MG/DL (ref 0.1–1)
BUN SERPL-MCNC: 9 MG/DL (ref 8–23)
CALCIUM SERPL-MCNC: 9.1 MG/DL (ref 8.7–10.5)
CHLORIDE SERPL-SCNC: 99 MMOL/L (ref 95–110)
CO2 SERPL-SCNC: 25 MMOL/L (ref 23–29)
CREAT SERPL-MCNC: 0.7 MG/DL (ref 0.5–1.4)
DIFFERENTIAL METHOD: ABNORMAL
EOSINOPHIL # BLD AUTO: 0.2 K/UL (ref 0–0.5)
EOSINOPHIL NFR BLD: 1.8 % (ref 0–8)
ERYTHROCYTE [DISTWIDTH] IN BLOOD BY AUTOMATED COUNT: 15.5 % (ref 11.5–14.5)
EST. GFR  (NO RACE VARIABLE): >60 ML/MIN/1.73 M^2
GLUCOSE SERPL-MCNC: 103 MG/DL (ref 70–110)
HCT VFR BLD AUTO: 29.2 % (ref 40–54)
HGB BLD-MCNC: 8.8 G/DL (ref 14–18)
IMM GRANULOCYTES # BLD AUTO: 0.04 K/UL (ref 0–0.04)
IMM GRANULOCYTES NFR BLD AUTO: 0.3 % (ref 0–0.5)
INR PPP: 1.2 (ref 0.8–1.2)
LYMPHOCYTES # BLD AUTO: 4.1 K/UL (ref 1–4.8)
LYMPHOCYTES NFR BLD: 34.8 % (ref 18–48)
MCH RBC QN AUTO: 27.8 PG (ref 27–31)
MCHC RBC AUTO-ENTMCNC: 30.1 G/DL (ref 32–36)
MCV RBC AUTO: 92 FL (ref 82–98)
MONOCYTES # BLD AUTO: 1.3 K/UL (ref 0.3–1)
MONOCYTES NFR BLD: 10.7 % (ref 4–15)
NEUTROPHILS # BLD AUTO: 6.2 K/UL (ref 1.8–7.7)
NEUTROPHILS NFR BLD: 51.9 % (ref 38–73)
NRBC BLD-RTO: 0 /100 WBC
PLATELET # BLD AUTO: 549 K/UL (ref 150–450)
PMV BLD AUTO: 10.1 FL (ref 9.2–12.9)
POTASSIUM SERPL-SCNC: 3.9 MMOL/L (ref 3.5–5.1)
PROT SERPL-MCNC: 6.9 G/DL (ref 6–8.4)
PROTHROMBIN TIME: 12.3 SEC (ref 9–12.5)
RBC # BLD AUTO: 3.16 M/UL (ref 4.6–6.2)
SODIUM SERPL-SCNC: 135 MMOL/L (ref 136–145)
WBC # BLD AUTO: 11.9 K/UL (ref 3.9–12.7)

## 2022-11-07 PROCEDURE — 80053 COMPREHEN METABOLIC PANEL: CPT | Performed by: FAMILY MEDICINE

## 2022-11-07 PROCEDURE — 82248 BILIRUBIN DIRECT: CPT | Performed by: FAMILY MEDICINE

## 2022-11-07 PROCEDURE — 36415 COLL VENOUS BLD VENIPUNCTURE: CPT | Performed by: FAMILY MEDICINE

## 2022-11-07 PROCEDURE — 85025 COMPLETE CBC W/AUTO DIFF WBC: CPT | Performed by: FAMILY MEDICINE

## 2022-11-07 PROCEDURE — 85610 PROTHROMBIN TIME: CPT | Performed by: FAMILY MEDICINE

## 2022-11-07 NOTE — NURSING
Patient's daughter Carrie states she manages and handels all of her fathers care. Spoke with her on the phone. Confirmed Y-90 mapping on 11/08/22. Instructed to be NPO after 0400, arrive to check in at [facility] for 1200, take am meds with a small sip of water, bring a current list of meds, and arrange a ride to and from the procedure. Confirmed ania is not diabetic, no blood thinners, and NKDA.

## 2022-11-08 ENCOUNTER — HOSPITAL ENCOUNTER (OUTPATIENT)
Dept: RADIOLOGY | Facility: HOSPITAL | Age: 72
Discharge: HOME | End: 2022-11-08
Attending: FAMILY MEDICINE
Payer: MEDICARE

## 2022-11-08 ENCOUNTER — HOSPITAL ENCOUNTER (OUTPATIENT)
Dept: INTERVENTIONAL RADIOLOGY/VASCULAR | Facility: HOSPITAL | Age: 72
Discharge: HOME | End: 2022-11-08
Attending: FAMILY MEDICINE
Payer: MEDICARE

## 2022-11-08 VITALS
HEART RATE: 89 BPM | TEMPERATURE: 97 F | BODY MASS INDEX: 14.12 KG/M2 | HEIGHT: 67 IN | OXYGEN SATURATION: 98 % | DIASTOLIC BLOOD PRESSURE: 79 MMHG | SYSTOLIC BLOOD PRESSURE: 109 MMHG | WEIGHT: 90 LBS | RESPIRATION RATE: 16 BRPM

## 2022-11-08 DIAGNOSIS — C18.9 COLON CANCER METASTASIZED TO LIVER: ICD-10-CM

## 2022-11-08 DIAGNOSIS — C78.7 COLON CANCER METASTASIZED TO LIVER: ICD-10-CM

## 2022-11-08 PROCEDURE — 77290 THER RAD SIMULAJ FIELD CPLX: CPT | Mod: TC | Performed by: RADIOLOGY

## 2022-11-08 PROCEDURE — 75774 ARTERY X-RAY EACH VESSEL: CPT | Mod: 26,,, | Performed by: RADIOLOGY

## 2022-11-08 PROCEDURE — 76937 PR  US GUIDE, VASCULAR ACCESS: ICD-10-PCS | Mod: 26,,, | Performed by: RADIOLOGY

## 2022-11-08 PROCEDURE — 75887 VEIN X-RAY LIVER W/O HEMODYN: CPT | Mod: 26,,, | Performed by: RADIOLOGY

## 2022-11-08 PROCEDURE — 75726 ARTERY X-RAYS ABDOMEN: CPT | Mod: TC | Performed by: RADIOLOGY

## 2022-11-08 PROCEDURE — C1894 INTRO/SHEATH, NON-LASER: HCPCS

## 2022-11-08 PROCEDURE — 99153 MOD SED SAME PHYS/QHP EA: CPT | Performed by: RADIOLOGY

## 2022-11-08 PROCEDURE — 99153 MOD SED SAME PHYS/QHP EA: CPT | Mod: 59

## 2022-11-08 PROCEDURE — 75887 VEIN X-RAY LIVER W/O HEMODYN: CPT | Mod: TC | Performed by: RADIOLOGY

## 2022-11-08 PROCEDURE — 78201 NM LIVER IMAGING STATIC PRE Y-90 EMBOLIZATION: ICD-10-PCS | Mod: 26,59,, | Performed by: RADIOLOGY

## 2022-11-08 PROCEDURE — 75887 PR  PERCUT XHEPATIC PORTOGRAM: ICD-10-PCS | Mod: 26,,, | Performed by: RADIOLOGY

## 2022-11-08 PROCEDURE — 36248 INS CATH ABD/L-EXT ART ADDL: CPT | Performed by: RADIOLOGY

## 2022-11-08 PROCEDURE — 75774 ARTERY X-RAY EACH VESSEL: CPT | Mod: TC | Performed by: RADIOLOGY

## 2022-11-08 PROCEDURE — 36248 INS CATH ABD/L-EXT ART ADDL: CPT | Mod: ,,, | Performed by: RADIOLOGY

## 2022-11-08 PROCEDURE — 75726 CHG ANGIO VISCERAL SELECTV/SUBSELEC: ICD-10-PCS | Mod: 26,,, | Performed by: RADIOLOGY

## 2022-11-08 PROCEDURE — 63600175 PHARM REV CODE 636 W HCPCS: Performed by: RADIOLOGY

## 2022-11-08 PROCEDURE — 76380 CAT SCAN FOLLOW-UP STUDY: CPT | Mod: 26,,, | Performed by: RADIOLOGY

## 2022-11-08 PROCEDURE — 76937 US GUIDE VASCULAR ACCESS: CPT | Mod: 26,,, | Performed by: RADIOLOGY

## 2022-11-08 PROCEDURE — 76380 PR  CT SCAN,LIMITED/LOCALIZED F/U STUDY: ICD-10-PCS | Mod: 26,,, | Performed by: RADIOLOGY

## 2022-11-08 PROCEDURE — 75726 ARTERY X-RAYS ABDOMEN: CPT | Mod: 26,,, | Performed by: RADIOLOGY

## 2022-11-08 PROCEDURE — 99152 MOD SED SAME PHYS/QHP 5/>YRS: CPT | Performed by: RADIOLOGY

## 2022-11-08 PROCEDURE — 76937 US GUIDE VASCULAR ACCESS: CPT | Mod: TC | Performed by: RADIOLOGY

## 2022-11-08 PROCEDURE — 78830 RP LOCLZJ TUM SPECT W/CT 1: CPT | Mod: TC

## 2022-11-08 PROCEDURE — 76380 CAT SCAN FOLLOW-UP STUDY: CPT | Mod: TC | Performed by: RADIOLOGY

## 2022-11-08 PROCEDURE — 36248 PR PR INS CATH ABD/L-EXT ART ADDL 2ND ORD/3RD ORD/BYD: ICD-10-PCS | Mod: ,,, | Performed by: RADIOLOGY

## 2022-11-08 PROCEDURE — 36247 IR EMBOLIZATION COMP FOR TUMOR_ORGAN ISCHEMIA_INFARC: ICD-10-PCS | Mod: ,,, | Performed by: RADIOLOGY

## 2022-11-08 PROCEDURE — 25500020 PHARM REV CODE 255: Performed by: RADIOLOGY

## 2022-11-08 PROCEDURE — 78201 LIVER IMAGING STATIC ONLY: CPT | Mod: 26,59,, | Performed by: RADIOLOGY

## 2022-11-08 PROCEDURE — 76377 3D RENDER W/INTRP POSTPROCES: CPT | Mod: 26,59,, | Performed by: RADIOLOGY

## 2022-11-08 PROCEDURE — 25000003 PHARM REV CODE 250: Performed by: RADIOLOGY

## 2022-11-08 PROCEDURE — 76377 PR  3D RENDERING W/ IMAGE POSTPROCESS: ICD-10-PCS | Mod: 26,59,, | Performed by: RADIOLOGY

## 2022-11-08 PROCEDURE — 77290 THER RAD SIMULAJ FIELD CPLX: CPT | Mod: 26,,, | Performed by: RADIOLOGY

## 2022-11-08 PROCEDURE — 36247 INS CATH ABD/L-EXT ART 3RD: CPT | Performed by: RADIOLOGY

## 2022-11-08 PROCEDURE — A4550 SURGICAL TRAYS: HCPCS

## 2022-11-08 PROCEDURE — 78830 NM SPECT/CT SINGLE AREA: ICD-10-PCS | Mod: 26,,, | Performed by: RADIOLOGY

## 2022-11-08 PROCEDURE — 78201 LIVER IMAGING STATIC ONLY: CPT | Mod: TC

## 2022-11-08 PROCEDURE — 77290 PR  SET RADN THERAPY FIELD COMPLEX: ICD-10-PCS | Mod: 26,,, | Performed by: RADIOLOGY

## 2022-11-08 PROCEDURE — 75774 PR  ANGIO EA ADDNL SELECTV VESSEL: ICD-10-PCS | Mod: 26,,, | Performed by: RADIOLOGY

## 2022-11-08 PROCEDURE — 78830 RP LOCLZJ TUM SPECT W/CT 1: CPT | Mod: 26,,, | Performed by: RADIOLOGY

## 2022-11-08 PROCEDURE — 99152 MOD SED SAME PHYS/QHP 5/>YRS: CPT | Mod: 59

## 2022-11-08 PROCEDURE — G0269 OCCLUSIVE DEVICE IN VEIN ART: HCPCS | Performed by: RADIOLOGY

## 2022-11-08 PROCEDURE — 76377 3D RENDER W/INTRP POSTPROCES: CPT | Mod: 59,TC | Performed by: RADIOLOGY

## 2022-11-08 RX ORDER — MIDAZOLAM HYDROCHLORIDE 1 MG/ML
INJECTION INTRAMUSCULAR; INTRAVENOUS
Status: COMPLETED | OUTPATIENT
Start: 2022-11-08 | End: 2022-11-08

## 2022-11-08 RX ORDER — FENTANYL CITRATE 50 UG/ML
INJECTION, SOLUTION INTRAMUSCULAR; INTRAVENOUS
Status: COMPLETED | OUTPATIENT
Start: 2022-11-08 | End: 2022-11-08

## 2022-11-08 RX ORDER — SODIUM CHLORIDE 9 MG/ML
INJECTION, SOLUTION INTRAVENOUS
Status: COMPLETED | OUTPATIENT
Start: 2022-11-08 | End: 2022-11-08

## 2022-11-08 RX ORDER — ONDANSETRON 2 MG/ML
4 INJECTION INTRAMUSCULAR; INTRAVENOUS EVERY 6 HOURS PRN
Status: DISCONTINUED | OUTPATIENT
Start: 2022-11-08 | End: 2022-11-09 | Stop reason: HOSPADM

## 2022-11-08 RX ORDER — LIDOCAINE HYDROCHLORIDE 10 MG/ML
INJECTION INFILTRATION; PERINEURAL
Status: COMPLETED | OUTPATIENT
Start: 2022-11-08 | End: 2022-11-08

## 2022-11-08 RX ADMIN — IOHEXOL 60 ML: 350 INJECTION, SOLUTION INTRAVENOUS at 02:11

## 2022-11-08 RX ADMIN — SODIUM CHLORIDE 500 ML: 0.9 INJECTION, SOLUTION INTRAVENOUS at 01:11

## 2022-11-08 RX ADMIN — MIDAZOLAM HYDROCHLORIDE 0.5 MG: 1 INJECTION, SOLUTION INTRAMUSCULAR; INTRAVENOUS at 01:11

## 2022-11-08 RX ADMIN — FENTANYL CITRATE 50 MCG: 50 INJECTION, SOLUTION INTRAMUSCULAR; INTRAVENOUS at 01:11

## 2022-11-08 RX ADMIN — LIDOCAINE HYDROCHLORIDE 5 ML: 10 INJECTION, SOLUTION INFILTRATION; PERINEURAL at 01:11

## 2022-11-08 RX ADMIN — FENTANYL CITRATE 25 MCG: 50 INJECTION, SOLUTION INTRAMUSCULAR; INTRAVENOUS at 02:11

## 2022-11-08 RX ADMIN — MIDAZOLAM HYDROCHLORIDE 1 MG: 1 INJECTION, SOLUTION INTRAMUSCULAR; INTRAVENOUS at 01:11

## 2022-11-08 NOTE — PROCEDURES
Radiology Post-Procedure Note    Pre Op Diagnosis: mCRC    Post Op Diagnosis: Same    Procedure: Y90 mapping    Procedure performed by: Raul Vidal MD    Written Informed Consent Obtained: Yes  Specimen Removed: NO  Estimated Blood Loss: Minimal    Findings:   Via rt cfa, celiac, acc lha, mha, rha, ant div rha, post div rha, s8 rha selected and angios obtained. Tumor feeders identified. MAA administered to rha. Vascade to rt cfa. No complications. See dictation.    Patient tolerated procedure well.    Raul Vidal M.D.  Interventional Radiology  Department of Radiology  Pager: [phone]

## 2022-11-08 NOTE — DISCHARGE INSTRUCTIONS
Remove Groin Dressing 24 hours after procedure  Clean area with gentle soap and water, do not pick at any scab formation  Monitor groin site for bleeding. If bleeding occurs, apply firm manual-pressure and see medical assistance immediately  Monitor groin site for signs and symptoms of infection  You can use the restroom as the same family members.  Waste is not radio active.    Recovering at home  Once at home, follow any instructions youre given. Be sure to:  Drink plenty of water to help flush any remaining contrast fluid out of your body. This may take up to 24 hours.  Take all medicines as directed. Some medicines should not be resumed after the angiogram to prevent an interaction with the contrast dye or damage to your kidneys. Be sure to ask your healthcare team about any potential reactions.  Care for the catheter insertion site as directed. You may remove the bandage after 24 hours.  Check for signs of infection at the catheter insertion site (see below).  Do not bathe or shower until your bandage is removed. If you wish, you may wash with a sponge or washcloth.  Avoid heavy lifting or other strenuous activities as directed to prevent the site from opening and bleeding.    When to call your healthcare provider  Call your healthcare provider if you have any of the following:  Fever of 100.4°F (38°C) or higher, or as directed by your healthcare provider  Signs of allergic reaction to the contrast fluid, such as rash, nausea or vomiting, or trouble breathing  Signs of infection at the catheter insertion site, such as increased pain, redness, swelling, warmth, bleeding, or foul-smelling discharge  Coldness, tingling, or numbness in the arm or leg near the catheter insertion site  Rapid, pounding, or irregular heartbeat  Sudden pain or swelling in the legs

## 2022-11-08 NOTE — H&P
Radiology History & Physical      SUBJECTIVE:     Chief Complaint: mCRC    History of Present Illness:  Javier Downs is a 72 y.o. male who presents for Y90 mapping  Past Medical History:   Diagnosis Date    MARIA A (acute kidney injury) 8/18/2022    Hypertension     Metastatic colon cancer to liver 4/22/2021     Past Surgical History:   Procedure Laterality Date    COLONOSCOPY N/A 4/20/2021    Procedure: COLONOSCOPY with possible stent;  Surgeon: EDILMA Bonilla MD;  Location: [facility] ENDO (2ND FLR);  Service: Colon and Rectal;  Laterality: N/A;    DIAGNOSTIC LAPAROSCOPY N/A 9/7/2022    Procedure: LAPAROSCOPY, DIAGNOSTIC;  Surgeon: Adrian Rodriguez MD;  Location: [facility] OR 2ND FLR;  Service: General;  Laterality: N/A;    INSERTION OF TUNNELED CENTRAL VENOUS CATHETER (CVC) WITH SUBCUTANEOUS PORT N/A 5/3/2021    Procedure: [id number];  Surgeon: Frnacisco Layton MD;  Location: [facility] OR 2ND FLR;  Service: Vascular;  Laterality: N/A;    OMENTECTOMY N/A 10/20/2022    Procedure: OMENTECTOMY;  Surgeon: EDILMA Bonilla MD;  Location: [facility] OR 2ND FLR;  Service: Colon and Rectal;  Laterality: N/A;    RIGHT HEMICOLECTOMY N/A 10/20/2022    Procedure: HEMICOLECTOMY, RIGHT, extended;  Surgeon: EDILMA Bonilla MD;  Location: [facility] OR 2ND FLR;  Service: Colon and Rectal;  Laterality: N/A;  Extended right hemicolectomy CONSENT IN AM       Home Meds:   Prior to Admission medications    Medication Sig Start Date End Date Taking? Authorizing Provider   amLODIPine (NORVASC) 10 MG tablet Take 1 tablet (10 mg total) by mouth once daily. 8/3/22 1/30/23 Yes Anali Hernandez PA-C   methocarbamoL (ROBAXIN) 500 MG Tab Take 1 tablet (500 mg total) by mouth 3 (three) times daily. 11/2/22 12/2/22 Yes Elsy Kelly NP   oxyCODONE (ROXICODONE) 5 MG immediate release tablet Take 1 tablet (5 mg total) by mouth every 6 (six) hours as needed for Pain. 11/2/22  Yes Elsy Kelly NP   acetaminophen (TYLENOL) 500 MG tablet Take 1 tablet  (500 mg total) by mouth every 6 (six) hours as needed for Pain (alternate with ibuprofen). 5/3/21   FRANKLIN Delarosa MD   ibuprofen (ADVIL,MOTRIN) 400 MG tablet Take 1 tablet (400 mg total) by mouth every 6 (six) hours as needed for Other (pain). Alternate with tylenol 5/3/21   FRANKLIN Delarosa MD   ondansetron (ZOFRAN) 4 MG tablet Take 1 tablet (4 mg total) by mouth every 8 (eight) hours as needed for Nausea. 4/21/21   Thmoas Monroe MD     Anticoagulants/Antiplatelets: no anticoagulation    Allergies: Review of patient's allergies indicates:  No Known Allergies  Sedation History:  no adverse reactions    Review of Systems:   Hematological: no known coagulopathies  Respiratory: no shortness of breath  Cardiovascular: no chest pain  Gastrointestinal: no abdominal pain  Genito-Urinary: no dysuria  Musculoskeletal: negative  Neurological: no TIA or stroke symptoms         OBJECTIVE:     Vital Signs (Most Recent)  Temp: 97.4 °F (36.3 °C) (11/08/22 1159)  Pulse: (!) 118 (11/08/22 1159)  Resp: 17 (11/08/22 1159)  BP: 98/68 (11/08/22 1159)  SpO2: 98 % (11/08/22 1159)    Physical Exam:  ASA: 3  Mallampati: 2    General: no acute distress  Mental Status: alert and oriented to person, place and time  HEENT: normocephalic, atraumatic  Chest: unlabored breathing  Heart: regular heart rate  Abdomen: nondistended  Extremity: moves all extremities    Laboratory  Lab Results   Component Value Date    INR 1.2 11/07/2022       Lab Results   Component Value Date    WBC 11.90 11/07/2022    HGB 8.8 (L) 11/07/2022    HCT 29.2 (L) 11/07/2022    MCV 92 11/07/2022     (H) 11/07/2022      Lab Results   Component Value Date     11/07/2022     (L) 11/07/2022    K 3.9 11/07/2022    CL 99 11/07/2022    CO2 25 11/07/2022    BUN 9 11/07/2022    CREATININE 0.7 11/07/2022    CALCIUM 9.1 11/07/2022    MG 1.9 11/02/2022    ALT 13 11/07/2022    AST 34 11/07/2022    ALBUMIN 2.2 (L) 11/07/2022    BILITOT 0.5 11/07/2022    BILIDIR 0.2  11/07/2022       ASSESSMENT/PLAN:     Sedation Plan: Moderate   Patient will undergo Y90 mapping.    Raul Vidal M.D.  Interventional Radiology  Department of Radiology  Pager: [phone]

## 2022-11-08 NOTE — DISCHARGE SUMMARY
Radiology Discharge Summary      Hospital Course: No complications    Admit Date: 11/8/2022  Discharge Date: 11/08/2022     Instructions Given to Patient: Yes  Diet: Resume prior diet  Activity: activity as tolerated and no driving for today    Description of Condition on Discharge: Stable  Vital Signs (Most Recent): Temp: 97.4 °F (36.3 °C) (11/08/22 1159)  Pulse: (!) 118 (11/08/22 1159)  Resp: 17 (11/08/22 1159)  BP: 98/68 (11/08/22 1159)  SpO2: 98 % (11/08/22 1159)    Discharge Disposition: Home    Discharge Diagnosis: mCRC s/p Y90 mapping    Follow up: As scheduled    Raul Vidla M.D.  Interventional Radiology  Department of Radiology  Pager: [phone]

## 2022-11-11 ENCOUNTER — TELEPHONE (OUTPATIENT)
Dept: SURGERY | Facility: CLINIC | Age: 72
End: 2022-11-11
Payer: MEDICARE

## 2022-11-11 NOTE — TELEPHONE ENCOUNTER
----- Message from Alice Storey RN sent at 11/11/2022  8:32 AM CST -----  Contact: pts daughter    ----- Message -----  From: Kristine Menjivar  Sent: 11/11/2022   8:06 AM CST  To: Michael ADHIKARI Staff    Pts daughter stats she needs to speak to someone in the office regarding appt for her father.     Confirmed contact below:  Contact Name:Javier Frostaditi  Phone Number: [phone]

## 2022-11-11 NOTE — TELEPHONE ENCOUNTER
Returned call. Spoke to pt daughter. Informed of post op appt with Dr. Rodriguez. Time date location provided. Daughter verbalized understanding.

## 2022-11-16 ENCOUNTER — OFFICE VISIT (OUTPATIENT)
Dept: HEMATOLOGY/ONCOLOGY | Facility: CLINIC | Age: 72
End: 2022-11-16
Payer: MEDICARE

## 2022-11-16 ENCOUNTER — INFUSION (OUTPATIENT)
Dept: INFUSION THERAPY | Facility: HOSPITAL | Age: 72
End: 2022-11-16
Attending: INTERNAL MEDICINE
Payer: MEDICARE

## 2022-11-16 VITALS
SYSTOLIC BLOOD PRESSURE: 105 MMHG | WEIGHT: 109.13 LBS | HEART RATE: 102 BPM | OXYGEN SATURATION: 99 % | BODY MASS INDEX: 18.18 KG/M2 | DIASTOLIC BLOOD PRESSURE: 68 MMHG | HEIGHT: 65 IN | TEMPERATURE: 97 F | RESPIRATION RATE: 18 BRPM

## 2022-11-16 VITALS
HEART RATE: 82 BPM | HEIGHT: 65 IN | WEIGHT: 109.13 LBS | DIASTOLIC BLOOD PRESSURE: 66 MMHG | RESPIRATION RATE: 18 BRPM | BODY MASS INDEX: 18.18 KG/M2 | SYSTOLIC BLOOD PRESSURE: 109 MMHG

## 2022-11-16 DIAGNOSIS — R52 PAIN: ICD-10-CM

## 2022-11-16 DIAGNOSIS — Z79.899 IMMUNODEFICIENCY DUE TO CHEMOTHERAPY: ICD-10-CM

## 2022-11-16 DIAGNOSIS — R16.0 LIVER MASSES: ICD-10-CM

## 2022-11-16 DIAGNOSIS — D84.821 IMMUNODEFICIENCY DUE TO CHEMOTHERAPY: ICD-10-CM

## 2022-11-16 DIAGNOSIS — I10 PRIMARY HYPERTENSION: ICD-10-CM

## 2022-11-16 DIAGNOSIS — C18.9 METASTATIC COLON CANCER TO LIVER: Primary | ICD-10-CM

## 2022-11-16 DIAGNOSIS — R39.9 LOWER URINARY TRACT SYMPTOMS (LUTS): ICD-10-CM

## 2022-11-16 DIAGNOSIS — R63.4 WEIGHT DECREASE: ICD-10-CM

## 2022-11-16 DIAGNOSIS — N17.9 AKI (ACUTE KIDNEY INJURY): ICD-10-CM

## 2022-11-16 DIAGNOSIS — C78.7 COLON CANCER METASTASIZED TO LIVER: ICD-10-CM

## 2022-11-16 DIAGNOSIS — C78.7 METASTATIC COLON CANCER TO LIVER: Primary | ICD-10-CM

## 2022-11-16 DIAGNOSIS — D64.81 ANEMIA ASSOCIATED WITH CHEMOTHERAPY: ICD-10-CM

## 2022-11-16 DIAGNOSIS — T45.1X5A ANEMIA ASSOCIATED WITH CHEMOTHERAPY: ICD-10-CM

## 2022-11-16 DIAGNOSIS — C18.9 COLON CANCER METASTASIZED TO LIVER: ICD-10-CM

## 2022-11-16 DIAGNOSIS — T45.1X5A IMMUNODEFICIENCY DUE TO CHEMOTHERAPY: ICD-10-CM

## 2022-11-16 PROCEDURE — 99999 PR PBB SHADOW E&M-EST. PATIENT-LVL III: CPT | Mod: PBBFAC,,, | Performed by: INTERNAL MEDICINE

## 2022-11-16 PROCEDURE — 96413 CHEMO IV INFUSION 1 HR: CPT

## 2022-11-16 PROCEDURE — 99215 OFFICE O/P EST HI 40 MIN: CPT | Mod: S$PBB,,, | Performed by: INTERNAL MEDICINE

## 2022-11-16 PROCEDURE — 99999 PR PBB SHADOW E&M-EST. PATIENT-LVL III: ICD-10-PCS | Mod: PBBFAC,,, | Performed by: INTERNAL MEDICINE

## 2022-11-16 PROCEDURE — 96361 HYDRATE IV INFUSION ADD-ON: CPT

## 2022-11-16 PROCEDURE — 96415 CHEMO IV INFUSION ADDL HR: CPT

## 2022-11-16 PROCEDURE — 96360 HYDRATION IV INFUSION INIT: CPT

## 2022-11-16 PROCEDURE — 99213 OFFICE O/P EST LOW 20 MIN: CPT | Mod: PBBFAC,25 | Performed by: INTERNAL MEDICINE

## 2022-11-16 PROCEDURE — 63600175 PHARM REV CODE 636 W HCPCS: Performed by: PHYSICIAN ASSISTANT

## 2022-11-16 PROCEDURE — 25000003 PHARM REV CODE 250: Performed by: PHYSICIAN ASSISTANT

## 2022-11-16 PROCEDURE — 25000003 PHARM REV CODE 250: Performed by: INTERNAL MEDICINE

## 2022-11-16 PROCEDURE — 96367 TX/PROPH/DG ADDL SEQ IV INF: CPT

## 2022-11-16 PROCEDURE — 99215 PR OFFICE/OUTPT VISIT, EST, LEVL V, 40-54 MIN: ICD-10-PCS | Mod: S$PBB,,, | Performed by: INTERNAL MEDICINE

## 2022-11-16 PROCEDURE — 96416 CHEMO PROLONG INFUSE W/PUMP: CPT

## 2022-11-16 RX ORDER — SODIUM CHLORIDE 0.9 % (FLUSH) 0.9 %
10 SYRINGE (ML) INJECTION
Status: DISCONTINUED | OUTPATIENT
Start: 2022-11-16 | End: 2022-11-16 | Stop reason: HOSPADM

## 2022-11-16 RX ORDER — HEPARIN 100 UNIT/ML
500 SYRINGE INTRAVENOUS
Status: CANCELLED | OUTPATIENT
Start: 2022-11-16

## 2022-11-16 RX ORDER — HEPARIN 100 UNIT/ML
500 SYRINGE INTRAVENOUS
Status: DISCONTINUED | OUTPATIENT
Start: 2022-11-16 | End: 2022-11-16 | Stop reason: HOSPADM

## 2022-11-16 RX ORDER — TAMSULOSIN HYDROCHLORIDE 0.4 MG/1
0.4 CAPSULE ORAL DAILY
Qty: 30 CAPSULE | Refills: 5 | Status: SHIPPED | OUTPATIENT
Start: 2022-11-16 | End: 2023-11-16

## 2022-11-16 RX ORDER — SODIUM CHLORIDE 0.9 % (FLUSH) 0.9 %
10 SYRINGE (ML) INJECTION
Status: CANCELLED | OUTPATIENT
Start: 2022-11-16

## 2022-11-16 RX ORDER — EPINEPHRINE 0.3 MG/.3ML
0.3 INJECTION SUBCUTANEOUS ONCE AS NEEDED
Status: DISCONTINUED | OUTPATIENT
Start: 2022-11-16 | End: 2022-11-16 | Stop reason: HOSPADM

## 2022-11-16 RX ORDER — DIPHENHYDRAMINE HYDROCHLORIDE 50 MG/ML
50 INJECTION INTRAMUSCULAR; INTRAVENOUS ONCE AS NEEDED
Status: DISCONTINUED | OUTPATIENT
Start: 2022-11-16 | End: 2022-11-16 | Stop reason: HOSPADM

## 2022-11-16 RX ADMIN — DEXTROSE: 50 INJECTION, SOLUTION INTRAVENOUS at 02:11

## 2022-11-16 RX ADMIN — OXALIPLATIN 131 MG: 5 INJECTION, SOLUTION INTRAVENOUS at 02:11

## 2022-11-16 RX ADMIN — FLUOROURACIL 3695 MG: 50 INJECTION, SOLUTION INTRAVENOUS at 04:11

## 2022-11-16 RX ADMIN — DEXAMETHASONE SODIUM PHOSPHATE 0.25 MG: 4 INJECTION, SOLUTION INTRA-ARTICULAR; INTRALESIONAL; INTRAMUSCULAR; INTRAVENOUS; SOFT TISSUE at 02:11

## 2022-11-16 RX ADMIN — SODIUM CHLORIDE 1000 ML: 0.9 INJECTION, SOLUTION INTRAVENOUS at 01:11

## 2022-11-16 NOTE — PROGRESS NOTES
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 Downs is a 72 y.o. male   70 years old pleasant AA gentleman, with history of hypertension, presenting with two weeks duration of abdominal cramps, diarrhea, nausea and vomiting. His symptoms started gradually with intermittent generalize crampy abdominal pain, worse with food. That was associated with nausea, reflux and occaisonal vomiting. He tried pepto-bismol and imodium with no relief, and hence he presented to ER.   He lost significant amount of weight, but cannot quantify the amount or the time period. He has also been feeling weaker than usual.   He hasn't seen a healthcare provider in over than 10 years. He has never had a colonoscopy.   In the ER. His labs were significant of microcytic anemia of 9.7 g/dl, MCV 77, and Platelets counts of 495. CT of the abdomen and pelvis showed " Large mass in the transverse colon highly suspicious for adenocarcinoma resulting in at least high-grade partial obstruction with dilation of proximal colon and small bowel. Soft tissue nodules in the adjacent mesentery are suspicious for pily metastases and/or mesenteric implants.  Numerous hepatic masses measuring up to 11.2 cm most likely related to metastatic disease.  There also several " "small subcapsular lesions which could reflect additional metastatic disease"   CT chest was negative to metastatic disease.   He underwent colonoscopy and stent placement. 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 HER 2 negative, VIRI/SHERI NGS was cancelled due to insufficient quantity   Guardant 360 was sent, negative for KRAS, NRAS, BRAF     He started palliative chemotherapy with FOLFOX+Pema     Restaging scans on 07/22/2021 showed significant decrease in size trasverse colon mass as well liver metastatic lesion.   He was switched to maintenance 5FU+Pema from C8    Restaging scans from 10/05/2021 (after C11) showing stable-mildly improved liver mets.   Restaging scans from 12/28/2021 (after C17) showing stable disease.   Restaging scans from 03/22/22 show stable disease.     MRI on 7/18/22    Impression:     Slight increase in size of the large hepatic metastasis when accounting for differences in imaging technique.  No new disease.     Remaining 2 liver lesions remain stable in size.  The larger lesion in the left hepatic lobe has features typical of a hemangioma.  Lesion at the hepatic dome is difficult to fully characterize due to location and breathing motion artifact, though may represent a hemangioma as well.  This can be followed.     Additional stable chronic findings as above.    Interval History:   He presents today with his daughter for follow-up prior to resuming chemotherapy for his metastatic transverse colon cancer.  He underwent R hemicolectomy on 10/20/22 with Dr. Bonilla, had post-op ileus - was on TPN briefly.  Was able to be discharged on 11/2/22. We did discuss him at CRC conference with recommendations for Y-90 to his enlarging liver metastasis.  He underwent mapping last week.  He reports some soreness around his wound but no significant pain. "  No nausea or vomiting, good appetite.  Bowels are mostly normal, mild constipation for which Miralax is helpful every couple days.    ECOG status is 1.       Past Medical History:   Past Medical History:   Diagnosis Date    MARIA A (acute kidney injury) 8/18/2022    Hypertension     Metastatic colon cancer to liver 4/22/2021       Past Surgical HIstory:   Past Surgical History:   Procedure Laterality Date    COLONOSCOPY N/A 4/20/2021    Procedure: COLONOSCOPY with possible stent;  Surgeon: EDILMA Bonilla MD;  Location: [facility] (2ND FLR);  Service: Colon and Rectal;  Laterality: N/A;    DIAGNOSTIC LAPAROSCOPY N/A 9/7/2022    Procedure: LAPAROSCOPY, DIAGNOSTIC;  Surgeon: Adrian Rodriguez MD;  Location: [facility] OR 2ND FLR;  Service: General;  Laterality: N/A;    INSERTION OF TUNNELED CENTRAL VENOUS CATHETER (CVC) WITH SUBCUTANEOUS PORT N/A 5/3/2021    Procedure: [id number];  Surgeon: Francisco Layton MD;  Location: [facility] OR 2ND FLR;  Service: Vascular;  Laterality: N/A;    OMENTECTOMY N/A 10/20/2022    Procedure: OMENTECTOMY;  Surgeon: EDILMA Bonilla MD;  Location: [facility] 2ND FLR;  Service: Colon and Rectal;  Laterality: N/A;    RIGHT HEMICOLECTOMY N/A 10/20/2022    Procedure: HEMICOLECTOMY, RIGHT, extended;  Surgeon: EDILMA Bonilla MD;  Location: [facility] OR 2ND FLR;  Service: Colon and Rectal;  Laterality: N/A;  Extended right hemicolectomy CONSENT IN AM       Family History: No family history on file.    Social History:  reports that he has never smoked. He has never used smokeless tobacco. He reports current alcohol use.    Allergies:  Review of patient's allergies indicates:  No Known Allergies    Medications:  Current Outpatient Medications   Medication Sig Dispense Refill    acetaminophen (TYLENOL) 500 MG tablet Take 1 tablet (500 mg total) by mouth every 6 (six) hours as needed for Pain (alternate with ibuprofen).  0    amLODIPine (NORVASC) 10 MG tablet Take 1 tablet (10 mg total) by mouth once daily.  90 tablet 3    ibuprofen (ADVIL,MOTRIN) 400 MG tablet Take 1 tablet (400 mg total) by mouth every 6 (six) hours as needed for Other (pain). Alternate with tylenol      methocarbamoL (ROBAXIN) 500 MG Tab Take 1 tablet (500 mg total) by mouth 3 (three) times daily. 90 tablet 0    ondansetron (ZOFRAN) 4 MG tablet Take 1 tablet (4 mg total) by mouth every 8 (eight) hours as needed for Nausea. 30 tablet 3    oxyCODONE (ROXICODONE) 5 MG immediate release tablet Take 1 tablet (5 mg total) by mouth every 6 (six) hours as needed for Pain. 20 tablet 0    tamsulosin (FLOMAX) 0.4 mg Cap Take 1 capsule (0.4 mg total) by mouth once daily. 30 capsule 5     No current facility-administered medications for this visit.     Facility-Administered Medications Ordered in Other Visits   Medication Dose Route Frequency Provider Last Rate Last Admin    alteplase injection 2 mg  2 mg Intra-Catheter PRN Anali Hernandez PA-C        diphenhydrAMINE injection 50 mg  50 mg Intravenous Once PRN Anali Hernanedz PA-C        EPINEPHrine (EPIPEN) 0.3 mg/0.3 mL pen injection 0.3 mg  0.3 mg Intramuscular Once PRN Anali Hernandez PA-C        fluorouraciL 2,400 mg/m2 = 3,695 mg in sodium chloride 0.9% 100 mL chemo infusion  2,400 mg/m2 Intravenous over 46 hr Anali Hernandez PA-C        heparin, porcine (PF) 100 unit/mL injection flush 500 Units  500 Units Intravenous PRN Anali Hernandez PA-C        hydrocortisone sodium succinate injection 100 mg  100 mg Intravenous Once PRN Anali Hernandez PA-C        oxaliplatin (ELOXATIN) 85 mg/m2 = 131 mg in dextrose 5 % 500 mL chemo infusion  85 mg/m2 Intravenous 1 time in Clinic/HOD Anali Hernandez PA-C   131 mg at 11/16/22 1424    sodium chloride 0.9% 100 mL flush bag   Intravenous 1 time in Clinic/HOD Anali Hernandez PA-C        sodium chloride 0.9% flush 10 mL  10 mL Intravenous PRN Anali Hernandez PA-C           Review of Systems   Constitutional:  Positive for appetite change, fatigue and unexpected  "weight change. Negative for fever.   HENT:  Negative for congestion and nosebleeds.    Eyes:  Negative for pain and visual disturbance.   Respiratory:  Negative for cough, chest tightness, shortness of breath and wheezing.    Cardiovascular:  Negative for chest pain, palpitations and leg swelling.   Gastrointestinal:  Negative for blood in stool, constipation, diarrhea, nausea and vomiting.   Genitourinary:  Negative for difficulty urinating, flank pain, frequency, hematuria and urgency.   Musculoskeletal:  Negative for arthralgias, back pain and myalgias.   Neurological:  Negative for dizziness, weakness, numbness and headaches.   Psychiatric/Behavioral:  The patient is not nervous/anxious.      ECOG Performance Status: 1   Objective:      Vitals:   Vitals:    11/16/22 1137   BP: 105/68   BP Location: Left arm   Patient Position: Sitting   BP Method: Medium (Automatic)   Pulse: 102   Resp: 18   Temp: 97.4 °F (36.3 °C)   TempSrc: Oral   SpO2: 99%   Weight: 49.5 kg (109 lb 2 oz)   Height: 5' 5" (1.651 m)       Physical Exam  Constitutional:       General: He is not in acute distress.     Appearance: Normal appearance.   HENT:      Head: Normocephalic and atraumatic.   Eyes:      Pupils: Pupils are equal, round, and reactive to light.   Cardiovascular:      Rate and Rhythm: Normal rate and regular rhythm.      Pulses: Normal pulses.      Heart sounds: Normal heart sounds. No murmur heard.  Pulmonary:      Effort: Pulmonary effort is normal. No respiratory distress.      Breath sounds: Normal breath sounds. No wheezing.   Abdominal:      General: Abdomen is flat. Bowel sounds are normal. There is no distension.      Palpations: Abdomen is soft. There is no mass.      Tenderness: There is no abdominal tenderness.      Comments: Vertical midline abdominal wound well healing   Musculoskeletal:         General: No swelling or deformity. Normal range of motion.   Skin:     Coloration: Skin is not jaundiced.   Neurological: "      General: No focal deficit present.      Mental Status: He is alert and oriented to person, place, and time. Mental status is at baseline.   Psychiatric:         Mood and Affect: Mood normal.       Laboratory Data:  Lab Visit on 11/16/2022   Component Date Value Ref Range Status    WBC 11/16/2022 9.32  3.90 - 12.70 K/uL Final    RBC 11/16/2022 3.57 (L)  4.60 - 6.20 M/uL Final    Hemoglobin 11/16/2022 9.8 (L)  14.0 - 18.0 g/dL Final    Hematocrit 11/16/2022 31.4 (L)  40.0 - 54.0 % Final    MCV 11/16/2022 88  82 - 98 fL Final    MCH 11/16/2022 27.5  27.0 - 31.0 pg Final    MCHC 11/16/2022 31.2 (L)  32.0 - 36.0 g/dL Final    RDW 11/16/2022 15.2 (H)  11.5 - 14.5 % Final    Platelets 11/16/2022 671 (H)  150 - 450 K/uL Final    MPV 11/16/2022 9.9  9.2 - 12.9 fL Final    Immature Granulocytes 11/16/2022 0.3  0.0 - 0.5 % Final    Gran # (ANC) 11/16/2022 6.5  1.8 - 7.7 K/uL Final    Immature Grans (Abs) 11/16/2022 0.03  0.00 - 0.04 K/uL Final    Comment: Mild elevation in immature granulocytes is non specific and   can be seen in a variety of conditions including stress response,   acute inflammation, trauma and pregnancy. Correlation with other   laboratory and clinical findings is essential.      Lymph # 11/16/2022 1.6  1.0 - 4.8 K/uL Final    Mono # 11/16/2022 1.1 (H)  0.3 - 1.0 K/uL Final    Eos # 11/16/2022 0.0  0.0 - 0.5 K/uL Final    Baso # 11/16/2022 0.04  0.00 - 0.20 K/uL Final    nRBC 11/16/2022 0  0 /100 WBC Final    Gran % 11/16/2022 70.2  38.0 - 73.0 % Final    Lymph % 11/16/2022 17.1 (L)  18.0 - 48.0 % Final    Mono % 11/16/2022 11.7  4.0 - 15.0 % Final    Eosinophil % 11/16/2022 0.3  0.0 - 8.0 % Final    Basophil % 11/16/2022 0.4  0.0 - 1.9 % Final    Differential Method 11/16/2022 Automated   Final    Sodium 11/16/2022 135 (L)  136 - 145 mmol/L Final    Potassium 11/16/2022 4.1  3.5 - 5.1 mmol/L Final    Chloride 11/16/2022 98  95 - 110 mmol/L Final    CO2 11/16/2022 26  23 - 29 mmol/L Final    Glucose  11/16/2022 106  70 - 110 mg/dL Final    BUN 11/16/2022 8  8 - 23 mg/dL Final    Creatinine 11/16/2022 0.7  0.5 - 1.4 mg/dL Final    Calcium 11/16/2022 9.1  8.7 - 10.5 mg/dL Final    Total Protein 11/16/2022 7.1  6.0 - 8.4 g/dL Final    Albumin 11/16/2022 2.4 (L)  3.5 - 5.2 g/dL Final    Total Bilirubin 11/16/2022 0.6  0.1 - 1.0 mg/dL Final    Comment: For infants and newborns, interpretation of results should be based  on gestational age, weight and in agreement with clinical  observations.    Premature Infant recommended reference ranges:  Up to 24 hours.............<8.0 mg/dL  Up to 48 hours............<12.0 mg/dL  3-5 days..................<15.0 mg/dL  6-29 days.................<15.0 mg/dL      Alkaline Phosphatase 11/16/2022 123  55 - 135 U/L Final    AST 11/16/2022 35  10 - 40 U/L Final    ALT 11/16/2022 12  10 - 44 U/L Final    Anion Gap 11/16/2022 11  8 - 16 mmol/L Final    eGFR 11/16/2022 >60.0  >60 mL/min/1.73 m^2 Final    CEA 11/16/2022 100.1 (H)  0.0 - 5.0 ng/mL Final    Comment: CEA Normal Range:  Non-Smokers: 0-3.0 ng/mL  Smokers:     0-5.0 ng/mL    The testing method is a chemiluminescent microparticle immunoassay   manufactured by [employer] and performed on the [employer]   or   [employer] system. Values obtained with different assay manufacturers   for   methods may be different and cannot be used interchangeably.       Labs reviewed. CEA increased to 100.1.      Assessment and Plan        1. 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 Primary hypertension    4. MARIA A (acute kidney injury)    5. Anemia associated with chemotherapy    6. Pain    7. Weight decrease    8. Lower urinary tract symptoms (LUTS)              1,2.  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  Guardant 360 was negative for BRAF, VIRI, HER2 amplification.   Pretreatment CEA 57.  We had a long and sonia discussion with him about his diagnosis.  Unfortunately, the disease is not curable but remains treatable and he has good performance status.   Restaging scans after 7 cycles showed significant reduction in colonic mass and liver mass.   we switched to maintenance as of cycle 8 with 5FU + Pema  Restaging scans from March 2022 show stable disease.   MRI on 7/18/22 showing hepatic progression of disease in the right hepatic lobe noted on the exam. CT CAP on 8/26 shows some mild progression in both liver metastases.    Discussed case at colorectal tumor board 8/31/22 and had a diagnostic lap performed by Dr. Rodriguez on 9/7 to assess for any occult peritoneal disease. Findings from the diagnostic lap were negative. Fluid from around from around the primary mass was sent for cytology that was negative for malignancy.   Underwent primary tumor resection on 10/20/22 with Dr. Bonilla.    Meanwhile his liver mets have grown.  We discussed his case at [facility] conference with plans for short term Y-90 and then restarting chemotherapy prior to considering any surgical options to his liver metastases.  He has undergone Y-90 mapping and is awaiting delivery.    Labs reviewed.  PS recovered from surgery.  Will proceed with FOLFOX today.  Monitor CEA.    RTC in 2 weeks for next cycle.    3. Hypertension   --Well Controlled.   --continue with Amlodipine .     4. MARIA A  --Improved renal function. Cr 0.7.    5. Anemia  -- Monitor.    6,7. Neoplasm related pain, weight loss  -- Will continue with pain management at this time  -- Hopeful that his pain and appetite will begin to improve as he recovers from surgery.     8. Urinary hesitancy  -- Trial of Flomax.    Bunny Schuster MD  Hematology/Oncology  [facility]      Route Chart for Scheduling    Med Onc Chart Routing      Follow up with physician 4 weeks. for FOLFOX   Follow up with RACHEL 2 weeks. for FOLFOX   Infusion scheduling note    Injection scheduling note    Labs CBC, CMP and CEA   Lab  interval:     Imaging    Pharmacy appointment    Other referrals        Treatment Plan Information   OP COLORECTAL FOLFOX + BEVACIZUMAB Q2W   Torres Pantoja MD   Upcoming Treatment Dates - OP COLORECTAL FOLFOX + BEVACIZUMAB Q2W    11/30/2022       Chemotherapy       oxaliplatin (ELOXATIN) in dextrose 5 % 500 mL chemo infusion       fluorouraciL in sodium chloride 0.9% 100 mL chemo infusion       Antiemetics       palonosetron (ALOXI) 0.25 mg with Dexamethasone (DECADRON) 12 mg in NS 50 mL IVPB    Therapy Plan Information  IV Fluids  sodium chloride 0.9% bolus 1,000 mL  1,000 mL, Intravenous, Every visit  Flushes  sodium chloride 0.9% flush 10 mL  10 mL, Intravenous, PRN  heparin, porcine (PF) 100 unit/mL injection flush 500 Units  500 Units, Intravenous, PRN

## 2022-11-16 NOTE — PLAN OF CARE
Pt arrives after appointment with provider for FOLFOX, IVF. Labs reviewed and orders signed by dr payne. Assessment complete pt tolerated well. PAC infusing 5fu to port at 2.2ml/hr via cadd pump. Settings verified, pump on and monitor reads run decreasing numbers present. Pt able to locate number to infusion services on side of pump. Scheduled to return for pump d/c 11/18/22 at 1445  . D/c home with family member ambulated away, no distress.

## 2022-11-18 ENCOUNTER — INFUSION (OUTPATIENT)
Dept: INFUSION THERAPY | Facility: HOSPITAL | Age: 72
End: 2022-11-18
Payer: MEDICARE

## 2022-11-18 VITALS
TEMPERATURE: 98 F | DIASTOLIC BLOOD PRESSURE: 67 MMHG | SYSTOLIC BLOOD PRESSURE: 110 MMHG | HEART RATE: 88 BPM | RESPIRATION RATE: 18 BRPM

## 2022-11-18 DIAGNOSIS — C18.9 METASTATIC COLON CANCER TO LIVER: Primary | ICD-10-CM

## 2022-11-18 DIAGNOSIS — C78.7 METASTATIC COLON CANCER TO LIVER: Primary | ICD-10-CM

## 2022-11-18 PROCEDURE — 63600175 PHARM REV CODE 636 W HCPCS: Performed by: INTERNAL MEDICINE

## 2022-11-18 PROCEDURE — 25000003 PHARM REV CODE 250: Performed by: INTERNAL MEDICINE

## 2022-11-18 PROCEDURE — A4216 STERILE WATER/SALINE, 10 ML: HCPCS | Performed by: INTERNAL MEDICINE

## 2022-11-18 RX ORDER — SODIUM CHLORIDE 0.9 % (FLUSH) 0.9 %
10 SYRINGE (ML) INJECTION
Status: DISCONTINUED | OUTPATIENT
Start: 2022-11-18 | End: 2022-11-18 | Stop reason: HOSPADM

## 2022-11-18 RX ORDER — HEPARIN 100 UNIT/ML
500 SYRINGE INTRAVENOUS
Status: DISCONTINUED | OUTPATIENT
Start: 2022-11-18 | End: 2022-11-18 | Stop reason: HOSPADM

## 2022-11-18 RX ADMIN — Medication 10 ML: at 03:11

## 2022-11-18 RX ADMIN — HEPARIN 500 UNITS: 100 SYRINGE at 03:11

## 2022-11-18 NOTE — NURSING
1505  Pt here for pump d/c, port flush, accompanied by daughter, no new complaints or concerns, reports tolerating treatment; CADD pump completed, port flushed per protocol; pt instructed to increase water hydration daily; discussed mild daily activity for increased strength/energy; fall safety at home/away; infection prevention during holiday gatherings; avoiding cold sensation; pt declined AVS, pt and daughter verbalized understanding of all discussed and when to report next

## 2022-11-21 ENCOUNTER — TELEPHONE (OUTPATIENT)
Dept: SURGERY | Facility: CLINIC | Age: 72
End: 2022-11-21
Payer: MEDICARE

## 2022-11-21 NOTE — TELEPHONE ENCOUNTER
----- Message from Adrian Rodriguez MD sent at 11/21/2022 11:41 AM CST -----  Yeah I think if he really wants to see me to go over the plan or something I can, but we should be seeing him about 12 weeks after Y90 with repeat imaging to see if we have some resection options  ----- Message -----  From: Esther Lee RN  Sent: 11/21/2022  11:14 AM CST  To: MD Dr. Michael Arcos,    I scheduled Mr. Downs for a follow up with you on 11/30 per the inpatient  request. Are you ready to see him back or are we holding off for Y90/chemo?    Thanks for clarifying!  Esther

## 2022-11-22 ENCOUNTER — TELEPHONE (OUTPATIENT)
Dept: SURGERY | Facility: CLINIC | Age: 72
End: 2022-11-22
Payer: MEDICARE

## 2022-11-22 ENCOUNTER — DOCUMENTATION ONLY (OUTPATIENT)
Dept: HEMATOLOGY/ONCOLOGY | Facility: CLINIC | Age: 72
End: 2022-11-22
Payer: MEDICARE

## 2022-11-23 ENCOUNTER — OFFICE VISIT (OUTPATIENT)
Dept: SURGERY | Facility: CLINIC | Age: 72
End: 2022-11-23
Payer: MEDICARE

## 2022-11-23 VITALS
WEIGHT: 102 LBS | HEIGHT: 65 IN | HEART RATE: 93 BPM | DIASTOLIC BLOOD PRESSURE: 65 MMHG | BODY MASS INDEX: 17 KG/M2 | SYSTOLIC BLOOD PRESSURE: 108 MMHG

## 2022-11-23 DIAGNOSIS — C18.4 MALIGNANT NEOPLASM OF TRANSVERSE COLON: Primary | ICD-10-CM

## 2022-11-23 PROCEDURE — 99999 PR PBB SHADOW E&M-EST. PATIENT-LVL III: CPT | Mod: PBBFAC,,, | Performed by: COLON & RECTAL SURGERY

## 2022-11-23 PROCEDURE — 99024 PR POST-OP FOLLOW-UP VISIT: ICD-10-PCS | Mod: POP,,, | Performed by: COLON & RECTAL SURGERY

## 2022-11-23 PROCEDURE — 99213 OFFICE O/P EST LOW 20 MIN: CPT | Mod: PBBFAC | Performed by: COLON & RECTAL SURGERY

## 2022-11-23 PROCEDURE — 99999 PR PBB SHADOW E&M-EST. PATIENT-LVL III: ICD-10-PCS | Mod: PBBFAC,,, | Performed by: COLON & RECTAL SURGERY

## 2022-11-23 PROCEDURE — 99024 POSTOP FOLLOW-UP VISIT: CPT | Mod: POP,,, | Performed by: COLON & RECTAL SURGERY

## 2022-11-25 ENCOUNTER — PATIENT MESSAGE (OUTPATIENT)
Dept: SURGERY | Facility: CLINIC | Age: 72
End: 2022-11-25
Payer: MEDICARE

## 2022-11-25 ENCOUNTER — TELEPHONE (OUTPATIENT)
Dept: SURGERY | Facility: CLINIC | Age: 72
End: 2022-11-25
Payer: MEDICARE

## 2022-12-02 ENCOUNTER — OFFICE VISIT (OUTPATIENT)
Dept: HEMATOLOGY/ONCOLOGY | Facility: CLINIC | Age: 72
End: 2022-12-02
Payer: MEDICARE

## 2022-12-02 ENCOUNTER — INFUSION (OUTPATIENT)
Dept: INFUSION THERAPY | Facility: HOSPITAL | Age: 72
End: 2022-12-02
Payer: MEDICARE

## 2022-12-02 VITALS
SYSTOLIC BLOOD PRESSURE: 100 MMHG | OXYGEN SATURATION: 99 % | TEMPERATURE: 98 F | HEART RATE: 95 BPM | DIASTOLIC BLOOD PRESSURE: 63 MMHG | WEIGHT: 102.94 LBS | RESPIRATION RATE: 20 BRPM | BODY MASS INDEX: 17.15 KG/M2 | HEIGHT: 65 IN

## 2022-12-02 VITALS
TEMPERATURE: 98 F | OXYGEN SATURATION: 100 % | RESPIRATION RATE: 17 BRPM | HEART RATE: 87 BPM | SYSTOLIC BLOOD PRESSURE: 114 MMHG | DIASTOLIC BLOOD PRESSURE: 80 MMHG

## 2022-12-02 DIAGNOSIS — R63.4 WEIGHT DECREASE: ICD-10-CM

## 2022-12-02 DIAGNOSIS — K59.03 DRUG-INDUCED CONSTIPATION: ICD-10-CM

## 2022-12-02 DIAGNOSIS — D64.81 ANEMIA ASSOCIATED WITH CHEMOTHERAPY: ICD-10-CM

## 2022-12-02 DIAGNOSIS — C78.7 METASTATIC COLON CANCER TO LIVER: Primary | ICD-10-CM

## 2022-12-02 DIAGNOSIS — R39.9 LOWER URINARY TRACT SYMPTOMS (LUTS): ICD-10-CM

## 2022-12-02 DIAGNOSIS — R52 PAIN: ICD-10-CM

## 2022-12-02 DIAGNOSIS — T45.1X5A ANEMIA ASSOCIATED WITH CHEMOTHERAPY: ICD-10-CM

## 2022-12-02 DIAGNOSIS — I10 PRIMARY HYPERTENSION: ICD-10-CM

## 2022-12-02 DIAGNOSIS — T45.1X5A IMMUNODEFICIENCY DUE TO CHEMOTHERAPY: ICD-10-CM

## 2022-12-02 DIAGNOSIS — D84.821 IMMUNODEFICIENCY DUE TO CHEMOTHERAPY: ICD-10-CM

## 2022-12-02 DIAGNOSIS — N17.9 AKI (ACUTE KIDNEY INJURY): ICD-10-CM

## 2022-12-02 DIAGNOSIS — C18.9 METASTATIC COLON CANCER TO LIVER: Primary | ICD-10-CM

## 2022-12-02 DIAGNOSIS — Z79.899 IMMUNODEFICIENCY DUE TO CHEMOTHERAPY: ICD-10-CM

## 2022-12-02 PROCEDURE — 99213 OFFICE O/P EST LOW 20 MIN: CPT | Mod: PBBFAC,25 | Performed by: INTERNAL MEDICINE

## 2022-12-02 PROCEDURE — 99215 PR OFFICE/OUTPT VISIT, EST, LEVL V, 40-54 MIN: ICD-10-PCS | Mod: S$PBB,,, | Performed by: INTERNAL MEDICINE

## 2022-12-02 PROCEDURE — 25000003 PHARM REV CODE 250: Performed by: INTERNAL MEDICINE

## 2022-12-02 PROCEDURE — 96413 CHEMO IV INFUSION 1 HR: CPT

## 2022-12-02 PROCEDURE — 96375 TX/PRO/DX INJ NEW DRUG ADDON: CPT

## 2022-12-02 PROCEDURE — 99999 PR PBB SHADOW E&M-EST. PATIENT-LVL III: ICD-10-PCS | Mod: PBBFAC,,, | Performed by: INTERNAL MEDICINE

## 2022-12-02 PROCEDURE — 99215 OFFICE O/P EST HI 40 MIN: CPT | Mod: S$PBB,,, | Performed by: INTERNAL MEDICINE

## 2022-12-02 PROCEDURE — 96367 TX/PROPH/DG ADDL SEQ IV INF: CPT

## 2022-12-02 PROCEDURE — 99999 PR PBB SHADOW E&M-EST. PATIENT-LVL III: CPT | Mod: PBBFAC,,, | Performed by: INTERNAL MEDICINE

## 2022-12-02 PROCEDURE — 63600175 PHARM REV CODE 636 W HCPCS: Performed by: INTERNAL MEDICINE

## 2022-12-02 PROCEDURE — 96415 CHEMO IV INFUSION ADDL HR: CPT

## 2022-12-02 RX ORDER — HEPARIN 100 UNIT/ML
500 SYRINGE INTRAVENOUS
Status: DISCONTINUED | OUTPATIENT
Start: 2022-12-02 | End: 2022-12-02 | Stop reason: HOSPADM

## 2022-12-02 RX ORDER — DIPHENHYDRAMINE HYDROCHLORIDE 50 MG/ML
50 INJECTION INTRAMUSCULAR; INTRAVENOUS ONCE AS NEEDED
Status: CANCELLED | OUTPATIENT
Start: 2022-12-02

## 2022-12-02 RX ORDER — SODIUM CHLORIDE 0.9 % (FLUSH) 0.9 %
10 SYRINGE (ML) INJECTION
Status: CANCELLED | OUTPATIENT
Start: 2022-12-02

## 2022-12-02 RX ORDER — SODIUM CHLORIDE 0.9 % (FLUSH) 0.9 %
10 SYRINGE (ML) INJECTION
Status: CANCELLED | OUTPATIENT
Start: 2022-12-04

## 2022-12-02 RX ORDER — HEPARIN 100 UNIT/ML
500 SYRINGE INTRAVENOUS
Status: CANCELLED | OUTPATIENT
Start: 2022-12-04

## 2022-12-02 RX ORDER — EPINEPHRINE 0.3 MG/.3ML
0.3 INJECTION SUBCUTANEOUS ONCE AS NEEDED
Status: CANCELLED | OUTPATIENT
Start: 2022-12-02

## 2022-12-02 RX ORDER — HEPARIN 100 UNIT/ML
500 SYRINGE INTRAVENOUS
Status: CANCELLED | OUTPATIENT
Start: 2022-12-02

## 2022-12-02 RX ORDER — SODIUM CHLORIDE 0.9 % (FLUSH) 0.9 %
10 SYRINGE (ML) INJECTION
Status: DISCONTINUED | OUTPATIENT
Start: 2022-12-02 | End: 2022-12-02 | Stop reason: HOSPADM

## 2022-12-02 RX ORDER — EPINEPHRINE 0.3 MG/.3ML
0.3 INJECTION SUBCUTANEOUS ONCE AS NEEDED
Status: DISCONTINUED | OUTPATIENT
Start: 2022-12-02 | End: 2022-12-02 | Stop reason: HOSPADM

## 2022-12-02 RX ORDER — DIPHENHYDRAMINE HYDROCHLORIDE 50 MG/ML
50 INJECTION INTRAMUSCULAR; INTRAVENOUS ONCE AS NEEDED
Status: COMPLETED | OUTPATIENT
Start: 2022-12-02 | End: 2022-12-02

## 2022-12-02 RX ADMIN — FLUOROURACIL 3500 MG: 50 INJECTION, SOLUTION INTRAVENOUS at 02:12

## 2022-12-02 RX ADMIN — OXALIPLATIN 128 MG: 5 INJECTION, SOLUTION INTRAVENOUS at 11:12

## 2022-12-02 RX ADMIN — DEXAMETHASONE SODIUM PHOSPHATE 0.25 MG: 4 INJECTION, SOLUTION INTRA-ARTICULAR; INTRALESIONAL; INTRAMUSCULAR; INTRAVENOUS; SOFT TISSUE at 11:12

## 2022-12-02 RX ADMIN — DEXTROSE: 50 INJECTION, SOLUTION INTRAVENOUS at 10:12

## 2022-12-02 RX ADMIN — DIPHENHYDRAMINE HYDROCHLORIDE 25 MG: 50 INJECTION, SOLUTION INTRAMUSCULAR; INTRAVENOUS at 01:12

## 2022-12-02 NOTE — PROGRESS NOTES
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 Downs is a 72 y.o. male   70 years old pleasant AA gentleman, with history of hypertension, presenting with two weeks duration of abdominal cramps, diarrhea, nausea and vomiting. His symptoms started gradually with intermittent generalize crampy abdominal pain, worse with food. That was associated with nausea, reflux and occaisonal vomiting. He tried pepto-bismol and imodium with no relief, and hence he presented to ER.   He lost significant amount of weight, but cannot quantify the amount or the time period. He has also been feeling weaker than usual.   He hasn't seen a healthcare provider in over than 10 years. He has never had a colonoscopy.   In the ER. His labs were significant of microcytic anemia of 9.7 g/dl, MCV 77, and Platelets counts of 495. CT of the abdomen and pelvis showed " Large mass in the transverse colon highly suspicious for adenocarcinoma resulting in at least high-grade partial obstruction with dilation of proximal colon and small bowel. Soft tissue nodules in the adjacent mesentery are suspicious for pily metastases and/or mesenteric implants.  Numerous hepatic masses measuring up to 11.2 cm most likely related to metastatic disease.  There also several " "small subcapsular lesions which could reflect additional metastatic disease"   CT chest was negative to metastatic disease.   He underwent colonoscopy and stent placement. 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 HER 2 negative, VIRI/SHERI NGS was cancelled due to insufficient quantity   Guardant 360 was sent, negative for KRAS, NRAS, BRAF     He started palliative chemotherapy with FOLFOX+Pema     Restaging scans on 07/22/2021 showed significant decrease in size trasverse colon mass as well liver metastatic lesion.   He was switched to maintenance 5FU+Pema from C8    Restaging scans from 10/05/2021 (after C11) showing stable-mildly improved liver mets.   Restaging scans from 12/28/2021 (after C17) showing stable disease.   Restaging scans from 03/22/22 show stable disease.     MRI on 7/18/22    Impression:     Slight increase in size of the large hepatic metastasis when accounting for differences in imaging technique.  No new disease.     Remaining 2 liver lesions remain stable in size.  The larger lesion in the left hepatic lobe has features typical of a hemangioma.  Lesion at the hepatic dome is difficult to fully characterize due to location and breathing motion artifact, though may represent a hemangioma as well.  This can be followed.     Additional stable chronic findings as above.    Interval History:   He presents today with his daughter for follow-up prior to cycle 37 of FOLFOX - 2nd dose since restarting on 11/16/22.   He underwent R hemicolectomy on 10/20/22 with Dr. Bonilla, had post-op ileus - was on TPN briefly.  We did discuss him at CRC conference with recommendations for Y-90 to his enlarging liver metastasis.  He is scheduled for Y-90 delivery on 12/30/22 with IR.    He reports good tolerance to the FOLFOX he received two weeks ago.  He had some " transient paresthesias that have since resolved - not painful.  Denies nausea/vomiting or any worsening in fatigue.  His weight has decreased about 9 pounds since last visit. States he is eating and appetite is good but he gets full easily.  He is also experiencing some constipation - taking Miralax about every other day which has helped.    ECOG status is 1.       Past Medical History:   Past Medical History:   Diagnosis Date    MARIA A (acute kidney injury) 8/18/2022    Hypertension     Metastatic colon cancer to liver 4/22/2021       Past Surgical HIstory:   Past Surgical History:   Procedure Laterality Date    COLONOSCOPY N/A 4/20/2021    Procedure: COLONOSCOPY with possible stent;  Surgeon: EDILMA Bonilla MD;  Location: [facility] ENDO (2ND FLR);  Service: Colon and Rectal;  Laterality: N/A;    DIAGNOSTIC LAPAROSCOPY N/A 9/7/2022    Procedure: LAPAROSCOPY, DIAGNOSTIC;  Surgeon: Adrian Rodriguez MD;  Location: [facility] OR 2ND FLR;  Service: General;  Laterality: N/A;    INSERTION OF TUNNELED CENTRAL VENOUS CATHETER (CVC) WITH SUBCUTANEOUS PORT N/A 5/3/2021    Procedure: [id number]-[id number];  Surgeon: Francisco Layton MD;  Location: [facility] OR 2ND FLR;  Service: Vascular;  Laterality: N/A;    OMENTECTOMY N/A 10/20/2022    Procedure: OMENTECTOMY;  Surgeon: EDILMA Bonilla MD;  Location: [facility] OR 2ND FLR;  Service: Colon and Rectal;  Laterality: N/A;    RIGHT HEMICOLECTOMY N/A 10/20/2022    Procedure: HEMICOLECTOMY, RIGHT, extended;  Surgeon: EDILMA Bonilla MD;  [facility];  Service: Colon and Rectal;  Laterality: N/A;  Extended right hemicolectomy CONSENT IN AM       Family History: No family history on file.    Social History:  reports that he has never smoked. He has never used smokeless tobacco. He reports current alcohol use.    Allergies:  Review of patient's allergies indicates:  No Known Allergies    Medications:  Current Outpatient Medications   Medication Sig Dispense Refill    acetaminophen (TYLENOL)  500 MG tablet Take 1 tablet (500 mg total) by mouth every 6 (six) hours as needed for Pain (alternate with ibuprofen).  0    amLODIPine (NORVASC) 10 MG tablet Take 1 tablet (10 mg total) by mouth once daily. 90 tablet 3    ibuprofen (ADVIL,MOTRIN) 400 MG tablet Take 1 tablet (400 mg total) by mouth every 6 (six) hours as needed for Other (pain). Alternate with tylenol      methocarbamoL (ROBAXIN) 500 MG Tab Take 1 tablet (500 mg total) by mouth 3 (three) times daily. 90 tablet 0    ondansetron (ZOFRAN) 4 MG tablet Take 1 tablet (4 mg total) by mouth every 8 (eight) hours as needed for Nausea. 30 tablet 3    oxyCODONE (ROXICODONE) 5 MG immediate release tablet Take 1 tablet (5 mg total) by mouth every 6 (six) hours as needed for Pain. 20 tablet 0    tamsulosin (FLOMAX) 0.4 mg Cap Take 1 capsule (0.4 mg total) by mouth once daily. 30 capsule 5     No current facility-administered medications for this visit.     Facility-Administered Medications Ordered in Other Visits   Medication Dose Route Frequency Provider Last Rate Last Admin    alteplase injection 2 mg  2 mg Intra-Catheter PRN Bunny Schuster MD        dextrose 5 % 250 mL flush bag   Intravenous 1 time in Clinic/HOD Bunny Schuster MD        diphenhydrAMINE injection 50 mg  50 mg Intravenous Once PRN Bunny Schuster MD        EPINEPHrine (EPIPEN) 0.3 mg/0.3 mL pen injection 0.3 mg  0.3 mg Intramuscular Once PRN Bunny Schuster MD        fluorouraciL 3,500 mg in sodium chloride 0.9% 100 mL chemo infusion  3,500 mg Intravenous over 46 hr Bunny Schuster MD        heparin, porcine (PF) 100 unit/mL injection flush 500 Units  500 Units Intravenous PRN Bunny Schuster MD        hydrocortisone sodium succinate injection 100 mg  100 mg Intravenous Once PRN Bunny Schuster MD        oxaliplatin (ELOXATIN) 85 mg/m2 = 128 mg in dextrose 5 % 500 mL chemo infusion  85 mg/m2 Intravenous 1 time in Clinic/HOD Bunny Schuster MD         "palonosetron 0.25mg/dexAMETHasone 12mg in NS IVPB   Intravenous 1 time in Clinic/HOD Bunny Schuster MD        sodium chloride 0.9% 100 mL flush bag   Intravenous 1 time in Clinic/HOD Bunny Schuster MD        sodium chloride 0.9% flush 10 mL  10 mL Intravenous PRN Bunny Schuster MD           Review of Systems   Constitutional:  Positive for fatigue and unexpected weight change. Negative for appetite change and fever.   HENT:  Negative for congestion and nosebleeds.    Eyes:  Negative for pain and visual disturbance.   Respiratory:  Negative for cough, chest tightness, shortness of breath and wheezing.    Cardiovascular:  Negative for chest pain, palpitations and leg swelling.   Gastrointestinal:  Positive for constipation. Negative for blood in stool, diarrhea, nausea and vomiting.   Genitourinary:  Negative for difficulty urinating, flank pain, frequency, hematuria and urgency.   Musculoskeletal:  Negative for arthralgias, back pain and myalgias.   Neurological:  Negative for dizziness, weakness, numbness and headaches.   Psychiatric/Behavioral:  The patient is not nervous/anxious.      ECOG Performance Status: 1   Objective:      Vitals:   Vitals:    12/02/22 0917   BP: 100/63   BP Location: Left arm   Patient Position: Sitting   BP Method: Small (Automatic)   Pulse: 95   Resp: 20   Temp: 97.7 °F (36.5 °C)   TempSrc: Oral   SpO2: 99%   Weight: 46.7 kg (102 lb 15.3 oz)   Height: 5' 5" (1.651 m)       Physical Exam  Constitutional:       General: He is not in acute distress.     Appearance: Normal appearance.   HENT:      Head: Normocephalic and atraumatic.   Eyes:      Pupils: Pupils are equal, round, and reactive to light.   Cardiovascular:      Rate and Rhythm: Normal rate and regular rhythm.      Pulses: Normal pulses.      Heart sounds: Normal heart sounds. No murmur heard.  Pulmonary:      Effort: Pulmonary effort is normal. No respiratory distress.      Breath sounds: Normal breath sounds. No " wheezing.   Abdominal:      General: Abdomen is flat. Bowel sounds are normal. There is no distension.      Palpations: Abdomen is soft. There is no mass.      Tenderness: There is no abdominal tenderness.      Comments: Vertical midline abdominal wound well healed   Musculoskeletal:         General: No swelling or deformity. Normal range of motion.   Skin:     Coloration: Skin is not jaundiced.   Neurological:      General: No focal deficit present.      Mental Status: He is alert and oriented to person, place, and time. Mental status is at baseline.   Psychiatric:         Mood and Affect: Mood normal.       Laboratory Data:  Lab Visit on 12/02/2022   Component Date Value Ref Range Status    WBC 12/02/2022 9.20  3.90 - 12.70 K/uL Final    RBC 12/02/2022 4.04 (L)  4.60 - 6.20 M/uL Final    Hemoglobin 12/02/2022 10.9 (L)  14.0 - 18.0 g/dL Final    Hematocrit 12/02/2022 36.5 (L)  40.0 - 54.0 % Final    MCV 12/02/2022 90  82 - 98 fL Final    MCH 12/02/2022 27.0  27.0 - 31.0 pg Final    MCHC 12/02/2022 29.9 (L)  32.0 - 36.0 g/dL Final    RDW 12/02/2022 15.9 (H)  11.5 - 14.5 % Final    Platelets 12/02/2022 437  150 - 450 K/uL Final    MPV 12/02/2022 10.8  9.2 - 12.9 fL Final    Gran # (ANC) 12/02/2022 4.3  1.8 - 7.7 K/uL Final    Comment: The ANC is based on a white cell differential from an   automated cell counter. It has not been microscopically   reviewed for the presence of abnormal cells. Clinical   correlation is required.      Immature Grans (Abs) 12/02/2022 0.04  0.00 - 0.04 K/uL Final    Comment: Mild elevation in immature granulocytes is non specific and   can be seen in a variety of conditions including stress response,   acute inflammation, trauma and pregnancy. Correlation with other   laboratory and clinical findings is essential.      Sodium 12/02/2022 139  136 - 145 mmol/L Final    Potassium 12/02/2022 4.1  3.5 - 5.1 mmol/L Final    Chloride 12/02/2022 101  95 - 110 mmol/L Final    CO2 12/02/2022 26  23  - 29 mmol/L Final    Glucose 12/02/2022 89  70 - 110 mg/dL Final    BUN 12/02/2022 7 (L)  8 - 23 mg/dL Final    Creatinine 12/02/2022 0.7  0.5 - 1.4 mg/dL Final    Calcium 12/02/2022 9.5  8.7 - 10.5 mg/dL Final    Total Protein 12/02/2022 7.8  6.0 - 8.4 g/dL Final    Albumin 12/02/2022 2.7 (L)  3.5 - 5.2 g/dL Final    Total Bilirubin 12/02/2022 1.1 (H)  0.1 - 1.0 mg/dL Final    Comment: For infants and newborns, interpretation of results should be based  on gestational age, weight and in agreement with clinical  observations.    Premature Infant recommended reference ranges:  Up to 24 hours.............<8.0 mg/dL  Up to 48 hours............<12.0 mg/dL  3-5 days..................<15.0 mg/dL  6-29 days.................<15.0 mg/dL      Alkaline Phosphatase 12/02/2022 117  55 - 135 U/L Final    AST 12/02/2022 26  10 - 40 U/L Final    ALT 12/02/2022 8 (L)  10 - 44 U/L Final    Anion Gap 12/02/2022 12  8 - 16 mmol/L Final    eGFR 12/02/2022 >60.0  >60 mL/min/1.73 m^2 Final     Labs reviewed. Improved albumin.  CEA pending.      Assessment and Plan        1. 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 Primary hypertension    4. MARIA A (acute kidney injury)    5. Anemia associated with chemotherapy    6. Pain    7. Weight decrease    8. Drug-induced constipation    9. Lower urinary tract symptoms (LUTS)                1,2.  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  Guardant 360 was negative for BRAF, VIRI, HER2 amplification.   Pretreatment CEA 57.  We had a long and sonia discussion with him about his diagnosis. Unfortunately, the disease is not curable but remains treatable and he has good performance status.   Restaging scans after 7 cycles of FOLFOX + Pema showed significant reduction in colonic mass and liver mass.   we switched to maintenance as of cycle 8 with 5FU + Pema  Restaging scans from March 2022 show stable disease.   MRI on 7/18/22 showing  hepatic progression of disease in the right hepatic lobe noted on the exam. CT CAP on 8/26 shows some mild progression in both liver metastases.    Discussed case at colorectal tumor board 8/31/22 and had a diagnostic lap performed by Dr. Rodriguez on 9/7 to assess for any occult peritoneal disease. Findings from the diagnostic lap were negative. Fluid from around from around the primary mass was sent for cytology that was negative for malignancy.   Underwent primary tumor resection on 10/20/22 with Dr. Bonilla.    Meanwhile his liver mets have grown.  We discussed his case at CRC conference with plans for short term Y-90 and then restarting chemotherapy prior to considering any surgical options to his liver metastases.  He has undergone Y-90 mapping and is undergoing delivery on 12/30/22.    Labs reviewed.  Acceptable to proceed with cycle 37 today of FOLFOX.  Monitor CEA.  Repeat imaging after cycle 39.    RTC in 2 weeks for next cycle.    3. Hypertension   --Well Controlled.   --continue with Amlodipine .     4. MARIA A  --Improved renal function. Cr normal.  Monitor.    5. Anemia  -- Monitor.    6-8. Neoplasm related pain, weight loss, constipation  -- Will continue with pain management at this time as pain control is improved.  -- Referral to nutrition today.  Kaylyn to see in chemo.  -- Recommended Miralax daily.    9. Urinary hesitancy  -- Continue Flomax.    Bunny Schuster MD  Hematology/Oncology  [facility] - [facility]      Route Chart for Scheduling    Med Onc Chart Routing      Follow up with physician 4 weeks. for FOLFOX   Follow up with RACHEL 2 weeks. for FOLFOX   Infusion scheduling note    Injection scheduling note    Labs CBC, CMP and CEA   Lab interval:     Imaging    Pharmacy appointment    Other referrals        Treatment Plan Information   OP COLORECTAL FOLFOX + BEVACIZUMAB Q2W   Torres Pantoja MD   Upcoming Treatment Dates - OP COLORECTAL FOLFOX + BEVACIZUMAB  Q2W    12/16/2022       Chemotherapy       oxaliplatin (ELOXATIN) in dextrose 5 % chemo infusion       fluorouraciL in sodium chloride 0.9% 100 mL chemo infusion       Antiemetics       palonosetron 0.25mg/dexAMETHasone 12mg in NS IVPB  12/30/2022       Chemotherapy       oxaliplatin (ELOXATIN) in dextrose 5 % chemo infusion       fluorouraciL in sodium chloride 0.9% 100 mL chemo infusion       Antiemetics       palonosetron 0.25mg/dexAMETHasone 12mg in NS IVPB  1/13/2023       Chemotherapy       oxaliplatin (ELOXATIN) in dextrose 5 % chemo infusion       fluorouraciL in sodium chloride 0.9% 100 mL chemo infusion       Antiemetics       palonosetron 0.25mg/dexAMETHasone 12mg in NS IVPB  1/27/2023       Chemotherapy       oxaliplatin (ELOXATIN) in dextrose 5 % chemo infusion       fluorouraciL in sodium chloride 0.9% 100 mL chemo infusion       Antiemetics       palonosetron 0.25mg/dexAMETHasone 12mg in NS IVPB    Therapy Plan Information  IV Fluids  sodium chloride 0.9% bolus 1,000 mL  1,000 mL, Intravenous, Every visit  Flushes  sodium chloride 0.9% flush 10 mL  10 mL, Intravenous, PRN  heparin, porcine (PF) 100 unit/mL injection flush 500 Units  500 Units, Intravenous, PRN

## 2022-12-02 NOTE — PLAN OF CARE
Pt ambulatory to clinic today with daughter for Folfox infusion. Denies any sig complaints. Port accessed without difficulty. Good blood return. Treatment started. Approx one hour into treatment pt began with itching to forearms and palms of hands. Denies SOB and VS WNL. Dr Schuster and KAJAL England notified. Benadryl ordered and given. Itching stopped and Oxali rate decreased to 125cc/hr. Restarted.

## 2022-12-02 NOTE — PLAN OF CARE
Pt completed treatment without any further issues. Itching resolved. 5FU infusing via CADD pump with RUN noted on screen and volume decreasing. RTC on Monday @ 1030. Pt and daughter aware. From clinic in [facility].

## 2022-12-05 ENCOUNTER — INFUSION (OUTPATIENT)
Dept: INFUSION THERAPY | Facility: HOSPITAL | Age: 72
End: 2022-12-05
Payer: MEDICARE

## 2022-12-05 VITALS
OXYGEN SATURATION: 99 % | SYSTOLIC BLOOD PRESSURE: 110 MMHG | TEMPERATURE: 98 F | HEART RATE: 83 BPM | RESPIRATION RATE: 17 BRPM | DIASTOLIC BLOOD PRESSURE: 65 MMHG

## 2022-12-05 DIAGNOSIS — C18.9 METASTATIC COLON CANCER TO LIVER: Primary | ICD-10-CM

## 2022-12-05 DIAGNOSIS — C78.7 METASTATIC COLON CANCER TO LIVER: Primary | ICD-10-CM

## 2022-12-05 PROCEDURE — A4216 STERILE WATER/SALINE, 10 ML: HCPCS | Performed by: INTERNAL MEDICINE

## 2022-12-05 PROCEDURE — 25000003 PHARM REV CODE 250: Performed by: INTERNAL MEDICINE

## 2022-12-05 PROCEDURE — 63600175 PHARM REV CODE 636 W HCPCS: Performed by: INTERNAL MEDICINE

## 2022-12-05 RX ORDER — HEPARIN 100 UNIT/ML
500 SYRINGE INTRAVENOUS
Status: DISCONTINUED | OUTPATIENT
Start: 2022-12-05 | End: 2022-12-05 | Stop reason: HOSPADM

## 2022-12-05 RX ORDER — SODIUM CHLORIDE 0.9 % (FLUSH) 0.9 %
10 SYRINGE (ML) INJECTION
Status: DISCONTINUED | OUTPATIENT
Start: 2022-12-05 | End: 2022-12-05 | Stop reason: HOSPADM

## 2022-12-05 RX ADMIN — Medication 10 ML: at 12:12

## 2022-12-05 RX ADMIN — HEPARIN 500 UNITS: 100 SYRINGE at 12:12

## 2022-12-05 NOTE — PLAN OF CARE
1220  Patient seated in chair, VSS, assessment done.  CADD volume 0, bag appears flat, infusion completed.  Tolerated well.  CADD disconnected from port.  Port de accessed without issue, flushed, blood return noted, flushed, heparin locked.  RTC 12/15/22  Patient ambulated off floor accompanied by daughter.

## 2022-12-07 ENCOUNTER — PES CALL (OUTPATIENT)
Dept: ADMINISTRATIVE | Facility: CLINIC | Age: 72
End: 2022-12-07
Payer: MEDICARE

## 2022-12-12 RX ORDER — HEPARIN 100 UNIT/ML
500 SYRINGE INTRAVENOUS
Status: CANCELLED | OUTPATIENT
Start: 2022-12-16

## 2022-12-12 RX ORDER — HEPARIN 100 UNIT/ML
500 SYRINGE INTRAVENOUS
Status: CANCELLED | OUTPATIENT
Start: 2022-12-18

## 2022-12-12 RX ORDER — DIPHENHYDRAMINE HYDROCHLORIDE 50 MG/ML
50 INJECTION INTRAMUSCULAR; INTRAVENOUS ONCE AS NEEDED
Status: CANCELLED | OUTPATIENT
Start: 2022-12-16

## 2022-12-12 RX ORDER — EPINEPHRINE 0.3 MG/.3ML
0.3 INJECTION SUBCUTANEOUS ONCE AS NEEDED
Status: CANCELLED | OUTPATIENT
Start: 2022-12-16

## 2022-12-12 RX ORDER — SODIUM CHLORIDE 0.9 % (FLUSH) 0.9 %
10 SYRINGE (ML) INJECTION
Status: CANCELLED | OUTPATIENT
Start: 2022-12-16

## 2022-12-12 RX ORDER — SODIUM CHLORIDE 0.9 % (FLUSH) 0.9 %
10 SYRINGE (ML) INJECTION
Status: CANCELLED | OUTPATIENT
Start: 2022-12-18

## 2022-12-14 NOTE — PROGRESS NOTES
"Oncology Nutrition Assessment for Medical Nutrition Therapy  Initial Visit    Javier Downs   1950    Referring Provider: Bunny Schuster MD      Reason for Visit: nutrition counseling and education    PMHx:   Past Medical History:   Diagnosis Date    MARIA A (acute kidney injury) 8/18/2022    Hypertension     Metastatic colon cancer to liver 4/22/2021       Nutrition Assessment    This is a 72 y.o.male with a medical diagnosis of metastatic colon cancer s/p cycle 37 palliative FOLFOX + Bevacizumab. He is also s/p R hemicolectomy 10/20/22 which was c/b post-op ileus requiring TPN briefly. He is scheduled for Y-90 12/30/22. He reports that his appetite has been improving lately. He is eating 4 meals/day, breakfast is his best meal. He also reports snacking, especially before bed. He reports that he had also been struggling with early satiety but this has also improved. He does well with protein intake, reports he eats all kinds. He also does well with fluid intake, drinks 2-3 bottles of water/day and Gatorade as well. He is also supplementing with 1 Boost/day. He reports constipation is under control with Miralax.    Weight: 48.4 kg (106 lb 11.2 oz)  Height: 5' 5" (165.1 cm)  BMI: 17.8  IBW: Ideal body weight: 62.7 kg (138 lb 5.4 oz)    Usual BW: 135-140lb  Weight Change: 35-40lb loss over 8-9 months; has regained 4lb over the past 2 weeks    Allergies: Patient has no known allergies.    Current Medications:  Current Outpatient Medications:     acetaminophen (TYLENOL) 500 MG tablet, Take 1 tablet (500 mg total) by mouth every 6 (six) hours as needed for Pain (alternate with ibuprofen)., Disp: , Rfl: 0    amLODIPine (NORVASC) 10 MG tablet, Take 1 tablet (10 mg total) by mouth once daily., Disp: 90 tablet, Rfl: 3    ibuprofen (ADVIL,MOTRIN) 400 MG tablet, Take 1 tablet (400 mg total) by mouth every 6 (six) hours as needed for Other (pain). Alternate with tylenol, Disp: , Rfl:     ondansetron (ZOFRAN) 4 MG " tablet, Take 1 tablet (4 mg total) by mouth every 8 (eight) hours as needed for Nausea., Disp: 30 tablet, Rfl: 3    oxyCODONE (ROXICODONE) 5 MG immediate release tablet, Take 1 tablet (5 mg total) by mouth every 6 (six) hours as needed for Pain., Disp: 20 tablet, Rfl: 0    tamsulosin (FLOMAX) 0.4 mg Cap, Take 1 capsule (0.4 mg total) by mouth once daily., Disp: 30 capsule, Rfl: 5    Food/medication interactions noted: none    Vitamins/Supplements: MVI    Labs: Reviewed    Nutrition Diagnosis    Problem: severe malnutrition  Etiology (related to): appetite loss and early satiety  Signs/Symptoms (as evidenced by): reports of inadequate PO intake, weight loss, and both fat & muscle wasting present    Nutrition Intervention    Nutrition Prescription   1936 kcals (40kcal/kg)  68g protein (1.4g/kg)   1936mL fluid (40mL/kg)    Recommendations:  Continue eating 4 meals/day and snacking  Include protein at all meals/snacks - examples reviewed  Continue to supplement with Boost daily  Drink at least 64oz/day    Nutrition Monitoring and Evaluation    Monitor: diet education needs, energy intake, and weight status    Goals: prevent further weight loss    Follow up: Patient provided with dietitian contact information and advised to call/message with questions or to make future appointment if further intervention is needed.    Communication to referring provider/care team: note available in chart; discussed with NANCY Maher    Counseling time: 10 minutes    Kaylyn Rosales, MS, RD, LDN  [phone]

## 2022-12-15 ENCOUNTER — CLINICAL SUPPORT (OUTPATIENT)
Dept: HEMATOLOGY/ONCOLOGY | Facility: CLINIC | Age: 72
End: 2022-12-15
Payer: MEDICARE

## 2022-12-15 ENCOUNTER — OFFICE VISIT (OUTPATIENT)
Dept: HEMATOLOGY/ONCOLOGY | Facility: CLINIC | Age: 72
End: 2022-12-15
Payer: MEDICARE

## 2022-12-15 ENCOUNTER — INFUSION (OUTPATIENT)
Dept: INFUSION THERAPY | Facility: HOSPITAL | Age: 72
End: 2022-12-15
Attending: INTERNAL MEDICINE
Payer: MEDICARE

## 2022-12-15 VITALS
OXYGEN SATURATION: 100 % | DIASTOLIC BLOOD PRESSURE: 66 MMHG | BODY MASS INDEX: 17.77 KG/M2 | SYSTOLIC BLOOD PRESSURE: 110 MMHG | TEMPERATURE: 98 F | RESPIRATION RATE: 18 BRPM | HEIGHT: 65 IN | HEART RATE: 82 BPM | WEIGHT: 106.69 LBS

## 2022-12-15 VITALS
BODY MASS INDEX: 17.77 KG/M2 | RESPIRATION RATE: 18 BRPM | DIASTOLIC BLOOD PRESSURE: 84 MMHG | WEIGHT: 106.69 LBS | SYSTOLIC BLOOD PRESSURE: 116 MMHG | HEIGHT: 65 IN | HEART RATE: 98 BPM | TEMPERATURE: 98 F

## 2022-12-15 DIAGNOSIS — C78.7 METASTATIC COLON CANCER TO LIVER: Primary | ICD-10-CM

## 2022-12-15 DIAGNOSIS — T45.1X5A ANEMIA ASSOCIATED WITH CHEMOTHERAPY: ICD-10-CM

## 2022-12-15 DIAGNOSIS — K59.03 DRUG-INDUCED CONSTIPATION: ICD-10-CM

## 2022-12-15 DIAGNOSIS — Z71.3 NUTRITIONAL COUNSELING: ICD-10-CM

## 2022-12-15 DIAGNOSIS — I10 PRIMARY HYPERTENSION: ICD-10-CM

## 2022-12-15 DIAGNOSIS — D84.821 IMMUNODEFICIENCY DUE TO CHEMOTHERAPY: ICD-10-CM

## 2022-12-15 DIAGNOSIS — D49.9 IMMUNODEFICIENCY SECONDARY TO NEOPLASM: ICD-10-CM

## 2022-12-15 DIAGNOSIS — C18.9 METASTATIC COLON CANCER TO LIVER: Primary | ICD-10-CM

## 2022-12-15 DIAGNOSIS — R63.4 WEIGHT DECREASE: ICD-10-CM

## 2022-12-15 DIAGNOSIS — T45.1X5A IMMUNODEFICIENCY DUE TO CHEMOTHERAPY: ICD-10-CM

## 2022-12-15 DIAGNOSIS — D84.81 IMMUNODEFICIENCY SECONDARY TO NEOPLASM: ICD-10-CM

## 2022-12-15 DIAGNOSIS — Z79.899 IMMUNODEFICIENCY DUE TO CHEMOTHERAPY: ICD-10-CM

## 2022-12-15 DIAGNOSIS — N17.9 AKI (ACUTE KIDNEY INJURY): ICD-10-CM

## 2022-12-15 DIAGNOSIS — E43 SEVERE MALNUTRITION: ICD-10-CM

## 2022-12-15 DIAGNOSIS — D64.81 ANEMIA ASSOCIATED WITH CHEMOTHERAPY: ICD-10-CM

## 2022-12-15 DIAGNOSIS — R52 PAIN: ICD-10-CM

## 2022-12-15 PROCEDURE — 25000003 PHARM REV CODE 250: Performed by: INTERNAL MEDICINE

## 2022-12-15 PROCEDURE — 99999 PR PBB SHADOW E&M-EST. PATIENT-LVL III: ICD-10-PCS | Mod: PBBFAC,,, | Performed by: REGISTERED NURSE

## 2022-12-15 PROCEDURE — 96413 CHEMO IV INFUSION 1 HR: CPT

## 2022-12-15 PROCEDURE — 63600175 PHARM REV CODE 636 W HCPCS: Performed by: INTERNAL MEDICINE

## 2022-12-15 PROCEDURE — 96375 TX/PRO/DX INJ NEW DRUG ADDON: CPT

## 2022-12-15 PROCEDURE — 99215 OFFICE O/P EST HI 40 MIN: CPT | Mod: S$PBB,,, | Performed by: REGISTERED NURSE

## 2022-12-15 PROCEDURE — 97802 MEDICAL NUTRITION INDIV IN: CPT | Mod: PBBFAC | Performed by: DIETITIAN, REGISTERED

## 2022-12-15 PROCEDURE — 99215 PR OFFICE/OUTPT VISIT, EST, LEVL V, 40-54 MIN: ICD-10-PCS | Mod: S$PBB,,, | Performed by: REGISTERED NURSE

## 2022-12-15 PROCEDURE — 99211 OFF/OP EST MAY X REQ PHY/QHP: CPT | Mod: 25,27,PBBFAC | Performed by: DIETITIAN, REGISTERED

## 2022-12-15 PROCEDURE — 99999 PR PBB SHADOW E&M-EST. PATIENT-LVL III: CPT | Mod: PBBFAC,,, | Performed by: REGISTERED NURSE

## 2022-12-15 PROCEDURE — 99213 OFFICE O/P EST LOW 20 MIN: CPT | Mod: 25,PBBFAC,27 | Performed by: REGISTERED NURSE

## 2022-12-15 PROCEDURE — 96415 CHEMO IV INFUSION ADDL HR: CPT

## 2022-12-15 PROCEDURE — 99999 PR PBB SHADOW E&M-EST. PATIENT-LVL I: ICD-10-PCS | Mod: PBBFAC,,, | Performed by: DIETITIAN, REGISTERED

## 2022-12-15 PROCEDURE — 96367 TX/PROPH/DG ADDL SEQ IV INF: CPT

## 2022-12-15 PROCEDURE — 96416 CHEMO PROLONG INFUSE W/PUMP: CPT

## 2022-12-15 PROCEDURE — 99999 PR PBB SHADOW E&M-EST. PATIENT-LVL I: CPT | Mod: PBBFAC,,, | Performed by: DIETITIAN, REGISTERED

## 2022-12-15 RX ORDER — DIPHENHYDRAMINE HYDROCHLORIDE 50 MG/ML
50 INJECTION INTRAMUSCULAR; INTRAVENOUS ONCE AS NEEDED
Status: COMPLETED | OUTPATIENT
Start: 2022-12-15 | End: 2022-12-15

## 2022-12-15 RX ORDER — HEPARIN 100 UNIT/ML
500 SYRINGE INTRAVENOUS
Status: DISCONTINUED | OUTPATIENT
Start: 2022-12-15 | End: 2022-12-15 | Stop reason: HOSPADM

## 2022-12-15 RX ORDER — SODIUM CHLORIDE 0.9 % (FLUSH) 0.9 %
10 SYRINGE (ML) INJECTION
Status: DISCONTINUED | OUTPATIENT
Start: 2022-12-15 | End: 2022-12-15 | Stop reason: HOSPADM

## 2022-12-15 RX ORDER — EPINEPHRINE 0.3 MG/.3ML
0.3 INJECTION SUBCUTANEOUS ONCE AS NEEDED
Status: DISCONTINUED | OUTPATIENT
Start: 2022-12-15 | End: 2022-12-15 | Stop reason: HOSPADM

## 2022-12-15 RX ADMIN — OXALIPLATIN 127 MG: 5 INJECTION, SOLUTION INTRAVENOUS at 01:12

## 2022-12-15 RX ADMIN — DEXTROSE MONOHYDRATE: 50 INJECTION, SOLUTION INTRAVENOUS at 12:12

## 2022-12-15 RX ADMIN — DIPHENHYDRAMINE HYDROCHLORIDE 50 MG: 50 INJECTION, SOLUTION INTRAMUSCULAR; INTRAVENOUS at 03:12

## 2022-12-15 RX ADMIN — DEXAMETHASONE SODIUM PHOSPHATE 0.25 MG: 4 INJECTION, SOLUTION INTRA-ARTICULAR; INTRALESIONAL; INTRAMUSCULAR; INTRAVENOUS; SOFT TISSUE at 12:12

## 2022-12-15 RX ADMIN — FLUOROURACIL 3500 MG: 50 INJECTION, SOLUTION INTRAVENOUS at 04:12

## 2022-12-15 NOTE — PLAN OF CARE
1625 pt tolerated Oxaliplatin after restart, pt had itchey palms half way through VSS, no other symptoms, gave Benadryl 50 MG IVP, notified Dr. Landen edge to restart when symptoms resolve, suggested possible to add Benadryl to next tx plan, pt with cadd pump infusing at 2.3 ml/hr without issue, pt to rtc satruday around 1330 for pump d/c, no distress noted upon d/c to home with daughter

## 2022-12-15 NOTE — PLAN OF CARE
1230 pt here for Folfox D1C38, labs, hx, meds, allergies reviewed, pt with no new complaints at this time, warm blanket provided, continue to monitor

## 2022-12-15 NOTE — PROGRESS NOTES
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 Downs is a 72 y.o. male   70 years old pleasant AA gentleman, with history of hypertension, presenting with two weeks duration of abdominal cramps, diarrhea, nausea and vomiting. His symptoms started gradually with intermittent generalize crampy abdominal pain, worse with food. That was associated with nausea, reflux and occaisonal vomiting. He tried pepto-bismol and imodium with no relief, and hence he presented to ER.   He lost significant amount of weight, but cannot quantify the amount or the time period. He has also been feeling weaker than usual.   He hasn't seen a healthcare provider in over than 10 years. He has never had a colonoscopy.   In the ER. His labs were significant of microcytic anemia of 9.7 g/dl, MCV 77, and Platelets counts of 495. CT of the abdomen and pelvis showed " Large mass in the transverse colon highly suspicious for adenocarcinoma resulting in at least high-grade partial obstruction with dilation of proximal colon and small bowel. Soft tissue nodules in the adjacent mesentery are suspicious for pily metastases and/or mesenteric implants.  Numerous hepatic masses measuring up to 11.2 cm most likely related to metastatic disease.  There also several small " "subcapsular lesions which could reflect additional metastatic disease"   CT chest was negative to metastatic disease.   He underwent colonoscopy and stent placement. 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 HER 2 negative, VIRI/SHERI NGS was cancelled due to insufficient quantity   Guardant 360 was sent, negative for KRAS, NRAS, BRAF     He started palliative chemotherapy with FOLFOX+Pema     Restaging scans on 07/22/2021 showed significant decrease in size trasverse colon mass as well liver metastatic lesion.   He was switched to maintenance 5FU+Pema from C8    Restaging scans from 10/05/2021 (after C11) showing stable-mildly improved liver mets.   Restaging scans from 12/28/2021 (after C17) showing stable disease.   Restaging scans from 03/22/22 show stable disease.     MRI on 7/18/22    Impression:     Slight increase in size of the large hepatic metastasis when accounting for differences in imaging technique.  No new disease.     Remaining 2 liver lesions remain stable in size.  The larger lesion in the left hepatic lobe has features typical of a hemangioma.  Lesion at the hepatic dome is difficult to fully characterize due to location and breathing motion artifact, though may represent a hemangioma as well.  This can be followed.     Additional stable chronic findings as above.    Interval History:   He presents today with his daughter for follow-up prior to cycle 38 of FOLFOX - 3rd dose since restarting on 11/16/22. Doing very well overall. Energy level is stable and appetite is doing better. Weight increased 4 lbs. Reports mild constipation but well managed with Miralax. Denies fever/chills, SOB, CP, palpitations, N/V, C/D, pain, blood in urine/stool, paresthesias. He is scheduled for Y-90 delivery on 12/30/22 with IR.     ECOG status is 1.       Past Medical " History:   Past Medical History:   Diagnosis Date    MARIA A (acute kidney injury) 8/18/2022    Hypertension     Metastatic colon cancer to liver 4/22/2021       Past Surgical HIstory:   Past Surgical History:   Procedure Laterality Date    COLONOSCOPY N/A 4/20/2021    Procedure: COLONOSCOPY with possible stent;  Surgeon: EDILMA Bonilla MD;  Location: [facility] ENDO (2ND FLR);  Service: Colon and Rectal;  Laterality: N/A;    DIAGNOSTIC LAPAROSCOPY N/A 9/7/2022    Procedure: LAPAROSCOPY, DIAGNOSTIC;  Surgeon: Adrian Rodriguez MD;  Location: [facility] OR 2ND FLR;  Service: General;  Laterality: N/A;    INSERTION OF TUNNELED CENTRAL VENOUS CATHETER (CVC) WITH SUBCUTANEOUS PORT N/A 5/3/2021    Procedure: [id number];  Surgeon: Francisco Layton MD;  Location: [facility] OR 2ND FLR;  Service: Vascular;  Laterality: N/A;    OMENTECTOMY N/A 10/20/2022    Procedure: OMENTECTOMY;  Surgeon: EDILMA Bonilla MD;  Location: [facility] OR 2ND FLR;  Service: Colon and Rectal;  Laterality: N/A;    RIGHT HEMICOLECTOMY N/A 10/20/2022    Procedure: HEMICOLECTOMY, RIGHT, extended;  Surgeon: EDILMA Bonilla MD;  Location: [facility] OR 2ND FLR;  Service: Colon and Rectal;  Laterality: N/A;  Extended right hemicolectomy CONSENT IN AM       Family History: History reviewed. No pertinent family history.    Social History:  reports that he has never smoked. He has never used smokeless tobacco. He reports current alcohol use.    Allergies:  Review of patient's allergies indicates:  No Known Allergies    Medications:  Current Outpatient Medications   Medication Sig Dispense Refill    acetaminophen (TYLENOL) 500 MG tablet Take 1 tablet (500 mg total) by mouth every 6 (six) hours as needed for Pain (alternate with ibuprofen).  0    amLODIPine (NORVASC) 10 MG tablet Take 1 tablet (10 mg total) by mouth once daily. 90 tablet 3    ibuprofen (ADVIL,MOTRIN) 400 MG tablet Take 1 tablet (400 mg total) by mouth every 6 (six) hours as needed for Other (pain). Alternate  with tylenol      ondansetron (ZOFRAN) 4 MG tablet Take 1 tablet (4 mg total) by mouth every 8 (eight) hours as needed for Nausea. 30 tablet 3    oxyCODONE (ROXICODONE) 5 MG immediate release tablet Take 1 tablet (5 mg total) by mouth every 6 (six) hours as needed for Pain. 20 tablet 0    tamsulosin (FLOMAX) 0.4 mg Cap Take 1 capsule (0.4 mg total) by mouth once daily. 30 capsule 5     No current facility-administered medications for this visit.     Facility-Administered Medications Ordered in Other Visits   Medication Dose Route Frequency Provider Last Rate Last Admin    alteplase injection 2 mg  2 mg Intra-Catheter PRN Abhi Manning MD        EPINEPHrine (EPIPEN) 0.3 mg/0.3 mL pen injection 0.3 mg  0.3 mg Intramuscular Once PRN Abhi Manning MD        fluorouraciL 3,500 mg in sodium chloride 0.9% 100 mL chemo infusion  3,500 mg Intravenous over 46 hr Abhi Manning MD        heparin, porcine (PF) 100 unit/mL injection flush 500 Units  500 Units Intravenous PRN Abhi Manning MD        hydrocortisone sodium succinate injection 100 mg  100 mg Intravenous Once PRN Abhi Manning MD        oxaliplatin (ELOXATIN) 85 mg/m2 = 127 mg in dextrose 5 % 590.4 mL chemo infusion  85 mg/m2 Intravenous 1 time in Clinic/HOD Abhi Manning MD   Paused at 12/15/22 1504    sodium chloride 0.9% 100 mL flush bag   Intravenous 1 time in Clinic/HOD Abhi Manning MD        sodium chloride 0.9% flush 10 mL  10 mL Intravenous PRN Abhi Manning MD           Review of Systems   Constitutional:  Positive for fatigue. Negative for appetite change, diaphoresis, fever and unexpected weight change.   HENT:  Negative for congestion, mouth sores, nosebleeds, trouble swallowing and voice change.    Eyes:  Negative for pain and visual disturbance.   Respiratory:  Negative for cough, chest tightness, shortness of breath and wheezing.    Cardiovascular:  Negative for chest pain, palpitations and leg swelling.   Gastrointestinal:  Positive for constipation.  "Negative for abdominal distention, abdominal pain, blood in stool, diarrhea, nausea and vomiting.   Genitourinary:  Negative for difficulty urinating, flank pain, frequency, hematuria and urgency.   Musculoskeletal:  Negative for arthralgias, back pain and myalgias.   Skin:  Negative for rash and wound.   Neurological:  Negative for dizziness, facial asymmetry, weakness, light-headedness, numbness and headaches.   Psychiatric/Behavioral:  Negative for agitation, behavioral problems, confusion and sleep disturbance. The patient is not nervous/anxious.      ECOG Performance Status: 1   Objective:      Vitals:   Vitals:    12/15/22 1101   BP: 110/66   BP Location: Right arm   Patient Position: Sitting   BP Method: Medium (Automatic)   Pulse: 82   Resp: 18   Temp: 98.2 °F (36.8 °C)   TempSrc: Oral   SpO2: 100%   Weight: 48.4 kg (106 lb 11.2 oz)   Height: 5' 5" (1.651 m)       Physical Exam  Vitals reviewed.   Constitutional:       General: He is not in acute distress.     Appearance: Normal appearance. He is not ill-appearing, toxic-appearing or diaphoretic.   HENT:      Head: Normocephalic and atraumatic.      Right Ear: External ear normal.      Left Ear: External ear normal.   Eyes:      General: No scleral icterus.     Extraocular Movements: Extraocular movements intact.      Conjunctiva/sclera: Conjunctivae normal.      Pupils: Pupils are equal, round, and reactive to light.   Cardiovascular:      Rate and Rhythm: Normal rate and regular rhythm.      Pulses: Normal pulses.      Heart sounds: Normal heart sounds. No murmur heard.  Pulmonary:      Effort: Pulmonary effort is normal. No respiratory distress.      Breath sounds: Normal breath sounds. No wheezing.   Abdominal:      General: Abdomen is flat. Bowel sounds are normal. There is no distension.      Palpations: Abdomen is soft. There is no mass.      Tenderness: There is no abdominal tenderness.      Comments: Vertical midline abdominal wound well healed "   Musculoskeletal:         General: No swelling or deformity. Normal range of motion.   Skin:     Coloration: Skin is not jaundiced.      Findings: No bruising, erythema or rash.   Neurological:      General: No focal deficit present.      Mental Status: He is alert and oriented to person, place, and time. Mental status is at baseline.      Cranial Nerves: No cranial nerve deficit.      Sensory: No sensory deficit.   Psychiatric:         Mood and Affect: Mood normal.         Behavior: Behavior normal.         Thought Content: Thought content normal.         Judgment: Judgment normal.       Laboratory Data:  Lab Visit on 12/15/2022   Component Date Value Ref Range Status    CEA 12/15/2022 69.7 (H)  0.0 - 5.0 ng/mL Final    Comment: CEA Normal Range:  Non-Smokers: 0-3.0 ng/mL  Smokers:     0-5.0 ng/mL    The testing method is a chemiluminescent microparticle immunoassay   manufactured by [employer] and performed on the Pro[employer]ify   or   InfiniDB system. Values obtained with different assay manufacturers   for   methods may be different and cannot be used interchangeably.      WBC 12/15/2022 8.37  3.90 - 12.70 K/uL Final    RBC 12/15/2022 3.69 (L)  4.60 - 6.20 M/uL Final    Hemoglobin 12/15/2022 9.8 (L)  14.0 - 18.0 g/dL Final    Hematocrit 12/15/2022 33.9 (L)  40.0 - 54.0 % Final    MCV 12/15/2022 92  82 - 98 fL Final    MCH 12/15/2022 26.6 (L)  27.0 - 31.0 pg Final    MCHC 12/15/2022 28.9 (L)  32.0 - 36.0 g/dL Final    RDW 12/15/2022 16.6 (H)  11.5 - 14.5 % Final    Platelets 12/15/2022 347  150 - 450 K/uL Final    MPV 12/15/2022 9.1 (L)  9.2 - 12.9 fL Final    Gran # (ANC) 12/15/2022 4.1  1.8 - 7.7 K/uL Final    Comment: The ANC is based on a white cell differential from an   automated cell counter. It has not been microscopically   reviewed for the presence of abnormal cells. Clinical   correlation is required.      Immature Grans (Abs) 12/15/2022 0.01  0.00 - 0.04 K/uL Final    Comment: Mild elevation in  immature granulocytes is non specific and   can be seen in a variety of conditions including stress response,   acute inflammation, trauma and pregnancy. Correlation with other   laboratory and clinical findings is essential.      Sodium 12/15/2022 136  136 - 145 mmol/L Final    Potassium 12/15/2022 3.3 (L)  3.5 - 5.1 mmol/L Final    Chloride 12/15/2022 103  95 - 110 mmol/L Final    CO2 12/15/2022 25  23 - 29 mmol/L Final    Glucose 12/15/2022 131 (H)  70 - 110 mg/dL Final    BUN 12/15/2022 8  8 - 23 mg/dL Final    Creatinine 12/15/2022 0.7  0.5 - 1.4 mg/dL Final    Calcium 12/15/2022 9.1  8.7 - 10.5 mg/dL Final    Total Protein 12/15/2022 7.3  6.0 - 8.4 g/dL Final    Albumin 12/15/2022 2.8 (L)  3.5 - 5.2 g/dL Final    Total Bilirubin 12/15/2022 0.7  0.1 - 1.0 mg/dL Final    Comment: For infants and newborns, interpretation of results should be based  on gestational age, weight and in agreement with clinical  observations.    Premature Infant recommended reference ranges:  Up to 24 hours.............<8.0 mg/dL  Up to 48 hours............<12.0 mg/dL  3-5 days..................<15.0 mg/dL  6-29 days.................<15.0 mg/dL      Alkaline Phosphatase 12/15/2022 110  55 - 135 U/L Final    AST 12/15/2022 23  10 - 40 U/L Final    ALT 12/15/2022 6 (L)  10 - 44 U/L Final    Anion Gap 12/15/2022 8  8 - 16 mmol/L Final    eGFR 12/15/2022 >60.0  >60 mL/min/1.73 m^2 Final     Labs reviewed. Improved albumin. Mild hypokalemia. CEA pending.      Assessment and Plan        1. 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 Primary hypertension    4. MARIA A (acute kidney injury)    5. Anemia associated with chemotherapy    6. Pain    7. Weight decrease    8. 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  Guardant 360 was negative for BRAF, VIRI, HER2 amplification.   Pretreatment CEA  57.  We had a long and sonia discussion with him about his diagnosis. Unfortunately, the disease is not curable but remains treatable and he has good performance status.   Restaging scans after 7 cycles of FOLFOX + Pema showed significant reduction in colonic mass and liver mass.   we switched to maintenance as of cycle 8 with 5FU + Pema  Restaging scans from March 2022 show stable disease.   MRI on 7/18/22 showing hepatic progression of disease in the right hepatic lobe noted on the exam. CT CAP on 8/26 shows some mild progression in both liver metastases.    Discussed case at colorectal tumor board 8/31/22 and had a diagnostic lap performed by Dr. Rodriguez on 9/7 to assess for any occult peritoneal disease. Findings from the diagnostic lap were negative. Fluid from around from around the primary mass was sent for cytology that was negative for malignancy.   Underwent primary tumor resection on 10/20/22 with Dr. Bonilla.    Meanwhile his liver mets have grown.  We discussed his case at [facility] conference with plans for short term Y-90 and then restarting chemotherapy prior to considering any surgical options to his liver metastases.  He has undergone Y-90 mapping and is undergoing delivery on 12/30/22.    Labs reviewed.  Acceptable to proceed with cycle 38 today of FOLFOX.  Monitor CEA. Repeat imaging after cycle 39.    RTC in 2 weeks for next cycle.    3. Hypertension   --Well Controlled.   --continue with Amlodipine .   --Following with PCP.     4. MARIA A  --Improved renal function. Cr normal.  Monitor.    5. Anemia  -- Stable. Monitor.  -- No signs of bleeding. Platelets normal.     6-8. Neoplasm related pain, weight loss, constipation  -- Will continue with pain management at this time as pain control is improved.  -- Following with valorie.  Kaylyn to see after our visit.   -- Continue Miralax daily for constipation.    9. Urinary hesitancy  -- Continue Flomax.    Patient is in agreement with the proposed treatment  plan. All questions were answered to the patient's satisfaction. Pt knows to call clinic if anything is needed before the next clinic visit.    At least 40 minutes were spent today on this encounter including face to face time with the patient, data gathering/interpretation and documentation.       Natividad Maher, MSN, APRN, ACCNS-  Hematology and Medical Oncology  Clinical Nurse Specialist to Dr. Schuster, Dr. Quijano & Dr. Lancaster Chart for Scheduling    Med Onc Chart Routing      Follow up with physician 2 weeks. RTC in 2 weeks with labs (CBC,CMP,CEA) to see Dr. Schuster for chemo   Follow up with RACHEL 4 weeks. RTC in 4 weeks with labs (CBC,CMP,CEA) to see Dr. Schuster for chemo   Infusion scheduling note    Injection scheduling note    Labs CBC, CMP and CEA   Lab interval: every 2 weeks     Imaging    Pharmacy appointment    Other referrals        Treatment Plan Information   OP COLORECTAL FOLFOX + BEVACIZUMAB Q2W   Torres Pantoja MD   Upcoming Treatment Dates - OP COLORECTAL FOLFOX + BEVACIZUMAB Q2W    12/30/2022       Chemotherapy       oxaliplatin (ELOXATIN) in dextrose 5 % chemo infusion       fluorouraciL in sodium chloride 0.9% 100 mL chemo infusion       Antiemetics       palonosetron 0.25mg/dexAMETHasone 12mg in NS IVPB  1/13/2023       Chemotherapy       oxaliplatin (ELOXATIN) in dextrose 5 % chemo infusion       fluorouraciL in sodium chloride 0.9% 100 mL chemo infusion       Antiemetics       palonosetron 0.25mg/dexAMETHasone 12mg in NS IVPB  1/27/2023       Chemotherapy       oxaliplatin (ELOXATIN) in dextrose 5 % chemo infusion       fluorouraciL in sodium chloride 0.9% 100 mL chemo infusion       Antiemetics       palonosetron 0.25mg/dexAMETHasone 12mg in NS IVPB    Therapy Plan Information  IV Fluids  sodium chloride 0.9% bolus 1,000 mL  1,000 mL, Intravenous, Every visit  Flushes  sodium chloride 0.9% flush 10 mL  10 mL, Intravenous, PRN  heparin, porcine (PF) 100 unit/mL injection flush  500 Units  500 Units, Intravenous, PRN

## 2022-12-17 ENCOUNTER — INFUSION (OUTPATIENT)
Dept: INFUSION THERAPY | Facility: HOSPITAL | Age: 72
End: 2022-12-17
Payer: MEDICARE

## 2022-12-17 VITALS — DIASTOLIC BLOOD PRESSURE: 61 MMHG | RESPIRATION RATE: 18 BRPM | HEART RATE: 95 BPM | SYSTOLIC BLOOD PRESSURE: 111 MMHG

## 2022-12-17 DIAGNOSIS — C78.7 METASTATIC COLON CANCER TO LIVER: Primary | ICD-10-CM

## 2022-12-17 DIAGNOSIS — C18.9 METASTATIC COLON CANCER TO LIVER: Primary | ICD-10-CM

## 2022-12-17 PROCEDURE — 63600175 PHARM REV CODE 636 W HCPCS: Performed by: INTERNAL MEDICINE

## 2022-12-17 PROCEDURE — 25000003 PHARM REV CODE 250: Performed by: INTERNAL MEDICINE

## 2022-12-17 PROCEDURE — A4216 STERILE WATER/SALINE, 10 ML: HCPCS | Performed by: INTERNAL MEDICINE

## 2022-12-17 RX ORDER — SODIUM CHLORIDE 0.9 % (FLUSH) 0.9 %
10 SYRINGE (ML) INJECTION
Status: DISCONTINUED | OUTPATIENT
Start: 2022-12-17 | End: 2022-12-17 | Stop reason: HOSPADM

## 2022-12-17 RX ORDER — HEPARIN 100 UNIT/ML
500 SYRINGE INTRAVENOUS
Status: DISCONTINUED | OUTPATIENT
Start: 2022-12-17 | End: 2022-12-17 | Stop reason: HOSPADM

## 2022-12-17 RX ADMIN — Medication 10 ML: at 01:12

## 2022-12-17 RX ADMIN — HEPARIN 500 UNITS: 100 SYRINGE at 01:12

## 2022-12-17 NOTE — NURSING
1320 pt here for pump d/c, infusion complete, port flushed per policy and deaccessed without issue, pt to rtc 12/29/22, no distress noted upon d/c to home

## 2022-12-21 NOTE — PROGRESS NOTES
HPI:  Javier Downs is a 72 y.o. male with history of extended right colectomy for obstructing transverse colon cancer, stage IV - liver metastases    Interval hx  Pt feels well.  Eating well, no N/V.  No fever.  Wound healing without problem  BMs 1-2 per day.   No blood      Past Medical History:   Diagnosis Date    MARIA A (acute kidney injury) 8/18/2022    Hypertension     Metastatic colon cancer to liver 4/22/2021        Past Surgical History:   Procedure Laterality Date    COLONOSCOPY N/A 4/20/2021    Procedure: COLONOSCOPY with possible stent;  Surgeon: EDILMA Bonilla MD;  Location: [facility] ENDO (2ND FLR);  Service: Colon and Rectal;  Laterality: N/A;    DIAGNOSTIC LAPAROSCOPY N/A 9/7/2022    Procedure: LAPAROSCOPY, DIAGNOSTIC;  Surgeon: Adrian Rodriguez MD;  Location: [facility] OR 2ND FLR;  Service: General;  Laterality: N/A;    INSERTION OF TUNNELED CENTRAL VENOUS CATHETER (CVC) WITH SUBCUTANEOUS PORT N/A 5/3/2021    Procedure: [id number];  Surgeon: Francisco Layton MD;  Location: [facility] OR 2ND FLR;  Service: Vascular;  Laterality: N/A;    OMENTECTOMY N/A 10/20/2022    Procedure: OMENTECTOMY;  Surgeon: EDILMA Bonilla MD;  Location: [facility] OR 2ND FLR;  Service: Colon and Rectal;  Laterality: N/A;    RIGHT HEMICOLECTOMY N/A 10/20/2022    Procedure: HEMICOLECTOMY, RIGHT, extended;  Surgeon: EDILMA Bonilla MD;  Location: [facility] OR 2ND FLR;  Service: Colon and Rectal;  Laterality: N/A;  Extended right hemicolectomy CONSENT IN AM       Review of patient's allergies indicates:  No Known Allergies    No family history on file.    Social History     Socioeconomic History    Marital status:    Tobacco Use    Smoking status: Never    Smokeless tobacco: Never   Substance and Sexual Activity    Alcohol use: Yes       ROS:  GENERAL: No fever, chills, fatigability or weight loss.  Integument: No rashes, redness, icterus  CHEST: Denies VARGAS, cyanosis, wheezing, cough and sputum production.  CARDIOVASCULAR: Denies chest  "pain, PND, orthopnea or reduced exercise tolerance.  GI: Denies abd pain, dysphagia, nausea, vomiting, no hematemesis   : Denies burning on urination, no hematuria, no bacteriuria  MSK: No deformities, swelling, joint pain swelling  Neurologic: No HAs, seizures, weakness, paresthesias, gait problems    PE:  General appearance thin  /65 (BP Location: Left arm, Patient Position: Sitting)   Pulse 93   Ht 5' 5" (1.651 m)   Wt 46.3 kg (102 lb)   BMI 16.97 kg/m²   Sclera/ Skin anicteric  LN none palpable  AT NC EOMI  Neck supple trachea midline   Chest symmetric, nl excursion, no retractions, breathing comfortably  Abdomen  ND soft NT.  no masses, palpable liver edge organomegaly  EXT - no CCE  Neuro:  Mood/ affect nl, alert and oriented x 3, moves all ext's, gait nl      Assessment:  Doing well following extended right colectomy  Wound healed  Liver metastases    Plan:  Palliative chemotherapy  Consider metastasectomy  RTC 3 months    "

## 2022-12-29 ENCOUNTER — INFUSION (OUTPATIENT)
Dept: INFUSION THERAPY | Facility: HOSPITAL | Age: 72
End: 2022-12-29
Attending: INTERNAL MEDICINE
Payer: MEDICARE

## 2022-12-29 ENCOUNTER — OFFICE VISIT (OUTPATIENT)
Dept: HEMATOLOGY/ONCOLOGY | Facility: CLINIC | Age: 72
End: 2022-12-29
Payer: MEDICARE

## 2022-12-29 VITALS
OXYGEN SATURATION: 98 % | TEMPERATURE: 99 F | DIASTOLIC BLOOD PRESSURE: 67 MMHG | HEART RATE: 78 BPM | HEART RATE: 86 BPM | OXYGEN SATURATION: 100 % | TEMPERATURE: 98 F | HEIGHT: 65 IN | RESPIRATION RATE: 18 BRPM | BODY MASS INDEX: 17.75 KG/M2 | SYSTOLIC BLOOD PRESSURE: 100 MMHG | WEIGHT: 106.56 LBS | RESPIRATION RATE: 17 BRPM | SYSTOLIC BLOOD PRESSURE: 98 MMHG | DIASTOLIC BLOOD PRESSURE: 65 MMHG

## 2022-12-29 DIAGNOSIS — C18.9 METASTATIC COLON CANCER TO LIVER: Primary | ICD-10-CM

## 2022-12-29 DIAGNOSIS — C78.7 METASTATIC COLON CANCER TO LIVER: Primary | ICD-10-CM

## 2022-12-29 DIAGNOSIS — D49.9 IMMUNODEFICIENCY SECONDARY TO NEOPLASM: ICD-10-CM

## 2022-12-29 DIAGNOSIS — D64.81 ANEMIA ASSOCIATED WITH CHEMOTHERAPY: ICD-10-CM

## 2022-12-29 DIAGNOSIS — D84.821 IMMUNODEFICIENCY DUE TO CHEMOTHERAPY: ICD-10-CM

## 2022-12-29 DIAGNOSIS — T45.1X5A ANEMIA ASSOCIATED WITH CHEMOTHERAPY: ICD-10-CM

## 2022-12-29 DIAGNOSIS — Z71.3 NUTRITIONAL COUNSELING: ICD-10-CM

## 2022-12-29 DIAGNOSIS — T45.1X5A IMMUNODEFICIENCY DUE TO CHEMOTHERAPY: ICD-10-CM

## 2022-12-29 DIAGNOSIS — N17.9 AKI (ACUTE KIDNEY INJURY): ICD-10-CM

## 2022-12-29 DIAGNOSIS — D84.81 IMMUNODEFICIENCY SECONDARY TO NEOPLASM: ICD-10-CM

## 2022-12-29 DIAGNOSIS — Z79.899 IMMUNODEFICIENCY DUE TO CHEMOTHERAPY: ICD-10-CM

## 2022-12-29 DIAGNOSIS — I10 PRIMARY HYPERTENSION: ICD-10-CM

## 2022-12-29 PROCEDURE — 63600175 PHARM REV CODE 636 W HCPCS: Performed by: INTERNAL MEDICINE

## 2022-12-29 PROCEDURE — 25000003 PHARM REV CODE 250: Performed by: INTERNAL MEDICINE

## 2022-12-29 PROCEDURE — 99999 PR PBB SHADOW E&M-EST. PATIENT-LVL III: CPT | Mod: PBBFAC,,, | Performed by: INTERNAL MEDICINE

## 2022-12-29 PROCEDURE — 99999 PR PBB SHADOW E&M-EST. PATIENT-LVL III: ICD-10-PCS | Mod: PBBFAC,,, | Performed by: INTERNAL MEDICINE

## 2022-12-29 PROCEDURE — 99215 PR OFFICE/OUTPT VISIT, EST, LEVL V, 40-54 MIN: ICD-10-PCS | Mod: S$PBB,,, | Performed by: INTERNAL MEDICINE

## 2022-12-29 PROCEDURE — 96413 CHEMO IV INFUSION 1 HR: CPT

## 2022-12-29 PROCEDURE — 96375 TX/PRO/DX INJ NEW DRUG ADDON: CPT

## 2022-12-29 PROCEDURE — 96415 CHEMO IV INFUSION ADDL HR: CPT

## 2022-12-29 PROCEDURE — 96416 CHEMO PROLONG INFUSE W/PUMP: CPT

## 2022-12-29 PROCEDURE — 96367 TX/PROPH/DG ADDL SEQ IV INF: CPT

## 2022-12-29 PROCEDURE — 99213 OFFICE O/P EST LOW 20 MIN: CPT | Mod: PBBFAC,25 | Performed by: INTERNAL MEDICINE

## 2022-12-29 PROCEDURE — 99215 OFFICE O/P EST HI 40 MIN: CPT | Mod: S$PBB,,, | Performed by: INTERNAL MEDICINE

## 2022-12-29 RX ORDER — SODIUM CHLORIDE 0.9 % (FLUSH) 0.9 %
10 SYRINGE (ML) INJECTION
Status: DISCONTINUED | OUTPATIENT
Start: 2022-12-29 | End: 2022-12-29 | Stop reason: HOSPADM

## 2022-12-29 RX ORDER — DIPHENHYDRAMINE HYDROCHLORIDE 50 MG/ML
50 INJECTION INTRAMUSCULAR; INTRAVENOUS ONCE AS NEEDED
Status: CANCELLED | OUTPATIENT
Start: 2022-12-30

## 2022-12-29 RX ORDER — HEPARIN 100 UNIT/ML
500 SYRINGE INTRAVENOUS
Status: DISCONTINUED | OUTPATIENT
Start: 2022-12-29 | End: 2022-12-29 | Stop reason: HOSPADM

## 2022-12-29 RX ORDER — HEPARIN 100 UNIT/ML
500 SYRINGE INTRAVENOUS
Status: CANCELLED | OUTPATIENT
Start: 2022-12-30

## 2022-12-29 RX ORDER — DIPHENHYDRAMINE HYDROCHLORIDE 50 MG/ML
50 INJECTION INTRAMUSCULAR; INTRAVENOUS ONCE AS NEEDED
Status: DISCONTINUED | OUTPATIENT
Start: 2022-12-29 | End: 2022-12-29 | Stop reason: HOSPADM

## 2022-12-29 RX ORDER — DIPHENHYDRAMINE HYDROCHLORIDE 50 MG/ML
25 INJECTION INTRAMUSCULAR; INTRAVENOUS
Status: COMPLETED | OUTPATIENT
Start: 2022-12-29 | End: 2022-12-29

## 2022-12-29 RX ORDER — DIPHENHYDRAMINE HYDROCHLORIDE 50 MG/ML
25 INJECTION INTRAMUSCULAR; INTRAVENOUS
Status: CANCELLED
Start: 2022-12-30

## 2022-12-29 RX ORDER — HEPARIN 100 UNIT/ML
500 SYRINGE INTRAVENOUS
Status: CANCELLED | OUTPATIENT
Start: 2023-01-01

## 2022-12-29 RX ORDER — SODIUM CHLORIDE 0.9 % (FLUSH) 0.9 %
10 SYRINGE (ML) INJECTION
Status: CANCELLED | OUTPATIENT
Start: 2023-01-01

## 2022-12-29 RX ORDER — EPINEPHRINE 0.3 MG/.3ML
0.3 INJECTION SUBCUTANEOUS ONCE AS NEEDED
Status: DISCONTINUED | OUTPATIENT
Start: 2022-12-29 | End: 2022-12-29 | Stop reason: HOSPADM

## 2022-12-29 RX ORDER — DIPHENHYDRAMINE HYDROCHLORIDE 50 MG/ML
25 INJECTION INTRAMUSCULAR; INTRAVENOUS
Status: CANCELLED
Start: 2023-01-01

## 2022-12-29 RX ORDER — EPINEPHRINE 0.3 MG/.3ML
0.3 INJECTION SUBCUTANEOUS ONCE AS NEEDED
Status: CANCELLED | OUTPATIENT
Start: 2022-12-30

## 2022-12-29 RX ORDER — SODIUM CHLORIDE 0.9 % (FLUSH) 0.9 %
10 SYRINGE (ML) INJECTION
Status: CANCELLED | OUTPATIENT
Start: 2022-12-30

## 2022-12-29 RX ADMIN — DEXAMETHASONE SODIUM PHOSPHATE 0.25 MG: 4 INJECTION, SOLUTION INTRA-ARTICULAR; INTRALESIONAL; INTRAMUSCULAR; INTRAVENOUS; SOFT TISSUE at 10:12

## 2022-12-29 RX ADMIN — DEXTROSE: 50 INJECTION, SOLUTION INTRAVENOUS at 09:12

## 2022-12-29 RX ADMIN — DIPHENHYDRAMINE HYDROCHLORIDE 25 MG: 50 INJECTION, SOLUTION INTRAMUSCULAR; INTRAVENOUS at 09:12

## 2022-12-29 RX ADMIN — FLUOROURACIL 3500 MG: 50 INJECTION, SOLUTION INTRAVENOUS at 12:12

## 2022-12-29 RX ADMIN — OXALIPLATIN 127 MG: 5 INJECTION, SOLUTION INTRAVENOUS at 10:12

## 2022-12-29 NOTE — PLAN OF CARE
1255  Patient completed Oxaliplatin infusion, tolerated well.  CADD pump connected to port to infuse @ 2.2 cc/hr for 46 hours for a total of 100 cc 5FU.  CADD infusing prior to patient ambulating off floor accompanied by daughter.  RTC 12/31/22 @ 1100 for pump DC.

## 2022-12-29 NOTE — PLAN OF CARE
9324  Patient seated in chair accompanied by daughter, VSS, assessment done.  Port accessed without issue, flushed, blood return noted.  Started D5W @ 25 cc/hr KVO while waiting for Oxaliplatin from pharmacy.  WCTM for safety

## 2022-12-29 NOTE — PROGRESS NOTES
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 Downs is a 72 y.o. male   70 years old pleasant AA gentleman, with history of hypertension, presenting with two weeks duration of abdominal cramps, diarrhea, nausea and vomiting. His symptoms started gradually with intermittent generalize crampy abdominal pain, worse with food. That was associated with nausea, reflux and occaisonal vomiting. He tried pepto-bismol and imodium with no relief, and hence he presented to ER.   He lost significant amount of weight, but cannot quantify the amount or the time period. He has also been feeling weaker than usual.   He hasn't seen a healthcare provider in over than 10 years. He has never had a colonoscopy.   In the ER. His labs were significant of microcytic anemia of 9.7 g/dl, MCV 77, and Platelets counts of 495. CT of the abdomen and pelvis showed " Large mass in the transverse colon highly suspicious for adenocarcinoma resulting in at least high-grade partial obstruction with dilation of proximal colon and small bowel. Soft tissue nodules in the adjacent mesentery are suspicious for pily metastases and/or mesenteric implants.  Numerous hepatic masses measuring up to 11.2 cm most likely related to metastatic disease.  There also several small " "subcapsular lesions which could reflect additional metastatic disease"   CT chest was negative to metastatic disease.   He underwent colonoscopy and stent placement. 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 HER 2 negative, VIRI/SHERI NGS was cancelled due to insufficient quantity   Guardant 360 was sent, negative for KRAS, NRAS, BRAF     He started palliative chemotherapy with FOLFOX+Pema     Restaging scans on 07/22/2021 showed significant decrease in size trasverse colon mass as well liver metastatic lesion.   He was switched to maintenance 5FU+Pema from C8    Restaging scans from 10/05/2021 (after C11) showing stable-mildly improved liver mets.   Restaging scans from 12/28/2021 (after C17) showing stable disease.   Restaging scans from 03/22/22 show stable disease.     MRI on 7/18/22    Impression:     Slight increase in size of the large hepatic metastasis when accounting for differences in imaging technique.  No new disease.     Remaining 2 liver lesions remain stable in size.  The larger lesion in the left hepatic lobe has features typical of a hemangioma.  Lesion at the hepatic dome is difficult to fully characterize due to location and breathing motion artifact, though may represent a hemangioma as well.  This can be followed.     Additional stable chronic findings as above.    Interval History:   He presents today with his daughter for follow-up prior to cycle 39 of FOLFOX. Since his last visit he reports doing well with no acute complaints other than chronic neuropathy that is unchanged along with mild constipation relieved with bowel regimen. He had a good Alli holiday with him continuing to have adequate oral intake with no significant nausea or vomiting. We discussed the overall plan of care at this visit for interval scans following this cycle to " guide care going forward with him being agreeable and understanding.     ECOG status is 1.       Past Medical History:   Past Medical History:   Diagnosis Date    MARIA A (acute kidney injury) 8/18/2022    Hypertension     Metastatic colon cancer to liver 4/22/2021       Past Surgical HIstory:   Past Surgical History:   Procedure Laterality Date    COLONOSCOPY N/A 4/20/2021    Procedure: COLONOSCOPY with possible stent;  Surgeon: EDILMA Bonilla MD;  Location: [facility] ENDO (2ND FLR);  Service: Colon and Rectal;  Laterality: N/A;    DIAGNOSTIC LAPAROSCOPY N/A 9/7/2022    Procedure: LAPAROSCOPY, DIAGNOSTIC;  Surgeon: Adrian Rodriguez MD;  Location: [facility] OR 2ND FLR;  Service: General;  Laterality: N/A;    INSERTION OF TUNNELED CENTRAL VENOUS CATHETER (CVC) WITH SUBCUTANEOUS PORT N/A 5/3/2021    Procedure: [id number]-CATH;  Surgeon: Francisco Layton MD;  Location: [facility] OR 2ND FLR;  Service: Vascular;  Laterality: N/A;    OMENTECTOMY N/A 10/20/2022    Procedure: OMENTECTOMY;  Surgeon: EDILMA Bonilla MD;  Location: [facility] OR 2ND FLR;  Service: Colon and Rectal;  Laterality: N/A;    RIGHT HEMICOLECTOMY N/A 10/20/2022    Procedure: HEMICOLECTOMY, RIGHT, extended;  Surgeon: EDILMA Bonilla MD;  Location: [facility] OR 2ND FLR;  Service: Colon and Rectal;  Laterality: N/A;  Extended right hemicolectomy CONSENT IN AM       Family History: No family history on file.    Social History:  reports that he has never smoked. He has never used smokeless tobacco. He reports current alcohol use.    Allergies:  Review of patient's allergies indicates:  No Known Allergies    Medications:  Current Outpatient Medications   Medication Sig Dispense Refill    acetaminophen (TYLENOL) 500 MG tablet Take 1 tablet (500 mg total) by mouth every 6 (six) hours as needed for Pain (alternate with ibuprofen).  0    amLODIPine (NORVASC) 10 MG tablet Take 1 tablet (10 mg total) by mouth once daily. 90 tablet 3    ibuprofen (ADVIL,MOTRIN) 400 MG tablet Take 1  "tablet (400 mg total) by mouth every 6 (six) hours as needed for Other (pain). Alternate with tylenol      ondansetron (ZOFRAN) 4 MG tablet Take 1 tablet (4 mg total) by mouth every 8 (eight) hours as needed for Nausea. 30 tablet 3    oxyCODONE (ROXICODONE) 5 MG immediate release tablet Take 1 tablet (5 mg total) by mouth every 6 (six) hours as needed for Pain. 20 tablet 0    tamsulosin (FLOMAX) 0.4 mg Cap Take 1 capsule (0.4 mg total) by mouth once daily. 30 capsule 5     No current facility-administered medications for this visit.       Review of Systems   Constitutional:  Positive for fatigue. Negative for appetite change, diaphoresis, fever and unexpected weight change.   HENT:  Negative for congestion, mouth sores, nosebleeds, trouble swallowing and voice change.    Eyes:  Negative for pain and visual disturbance.   Respiratory:  Negative for cough, chest tightness, shortness of breath and wheezing.    Cardiovascular:  Negative for chest pain, palpitations and leg swelling.   Gastrointestinal:  Positive for constipation. Negative for abdominal distention, abdominal pain, blood in stool, diarrhea, nausea and vomiting.   Genitourinary:  Negative for difficulty urinating, flank pain, frequency, hematuria and urgency.   Musculoskeletal:  Negative for arthralgias, back pain and myalgias.   Skin:  Negative for rash and wound.   Neurological:  Negative for dizziness, facial asymmetry, weakness, light-headedness, numbness and headaches.   Psychiatric/Behavioral:  Negative for agitation, behavioral problems, confusion and sleep disturbance. The patient is not nervous/anxious.      ECOG Performance Status: 1   Objective:      Vitals:   Vitals:    12/29/22 0900   BP: 100/67   BP Location: Left arm   Patient Position: Sitting   BP Method: Small (Automatic)   Pulse: 86   Resp: 18   Temp: 98.7 °F (37.1 °C)   TempSrc: Oral   SpO2: 100%   Weight: 48.4 kg (106 lb 9.5 oz)   Height: 5' 5" (1.651 m)       Physical Exam  Vitals " reviewed.   Constitutional:       General: He is not in acute distress.     Appearance: Normal appearance. He is not ill-appearing, toxic-appearing or diaphoretic.   HENT:      Head: Normocephalic and atraumatic.      Right Ear: External ear normal.      Left Ear: External ear normal.   Eyes:      General: No scleral icterus.     Extraocular Movements: Extraocular movements intact.      Conjunctiva/sclera: Conjunctivae normal.      Pupils: Pupils are equal, round, and reactive to light.   Cardiovascular:      Rate and Rhythm: Normal rate and regular rhythm.      Pulses: Normal pulses.      Heart sounds: Normal heart sounds. No murmur heard.  Pulmonary:      Effort: Pulmonary effort is normal. No respiratory distress.      Breath sounds: Normal breath sounds. No wheezing.   Abdominal:      General: Abdomen is flat. Bowel sounds are normal. There is no distension.      Palpations: Abdomen is soft. There is no mass.      Tenderness: There is no abdominal tenderness.      Comments: Vertical midline abdominal wound well healed   Musculoskeletal:         General: No swelling or deformity. Normal range of motion.   Skin:     Coloration: Skin is not jaundiced.      Findings: No bruising, erythema or rash.   Neurological:      General: No focal deficit present.      Mental Status: He is alert and oriented to person, place, and time. Mental status is at baseline.      Cranial Nerves: No cranial nerve deficit.      Sensory: No sensory deficit.   Psychiatric:         Mood and Affect: Mood normal.         Behavior: Behavior normal.         Thought Content: Thought content normal.         Judgment: Judgment normal.       Laboratory Data:  Lab Visit on 12/29/2022   Component Date Value Ref Range Status    WBC 12/29/2022 9.49  3.90 - 12.70 K/uL Final    RBC 12/29/2022 3.81 (L)  4.60 - 6.20 M/uL Final    Hemoglobin 12/29/2022 10.6 (L)  14.0 - 18.0 g/dL Final    Hematocrit 12/29/2022 34.0 (L)  40.0 - 54.0 % Final    MCV 12/29/2022 89   82 - 98 fL Final    MCH 12/29/2022 27.8  27.0 - 31.0 pg Final    MCHC 12/29/2022 31.2 (L)  32.0 - 36.0 g/dL Final    RDW 12/29/2022 16.9 (H)  11.5 - 14.5 % Final    Platelets 12/29/2022 267  150 - 450 K/uL Final    MPV 12/29/2022 9.2  9.2 - 12.9 fL Final    Immature Granulocytes 12/29/2022 0.2  0.0 - 0.5 % Final    Gran # (ANC) 12/29/2022 5.1  1.8 - 7.7 K/uL Final    Immature Grans (Abs) 12/29/2022 0.02  0.00 - 0.04 K/uL Final    Comment: Mild elevation in immature granulocytes is non specific and   can be seen in a variety of conditions including stress response,   acute inflammation, trauma and pregnancy. Correlation with other   laboratory and clinical findings is essential.      Lymph # 12/29/2022 2.2  1.0 - 4.8 K/uL Final    Mono # 12/29/2022 1.6 (H)  0.3 - 1.0 K/uL Final    Eos # 12/29/2022 0.4  0.0 - 0.5 K/uL Final    Baso # 12/29/2022 0.06  0.00 - 0.20 K/uL Final    nRBC 12/29/2022 0  0 /100 WBC Final    Gran % 12/29/2022 53.9  38.0 - 73.0 % Final    Lymph % 12/29/2022 23.5  18.0 - 48.0 % Final    Mono % 12/29/2022 17.2 (H)  4.0 - 15.0 % Final    Eosinophil % 12/29/2022 4.6  0.0 - 8.0 % Final    Basophil % 12/29/2022 0.6  0.0 - 1.9 % Final    Differential Method 12/29/2022 Automated   Final    Sodium 12/29/2022 138  136 - 145 mmol/L Final    Potassium 12/29/2022 4.3  3.5 - 5.1 mmol/L Final    Chloride 12/29/2022 104  95 - 110 mmol/L Final    CO2 12/29/2022 25  23 - 29 mmol/L Final    Glucose 12/29/2022 92  70 - 110 mg/dL Final    BUN 12/29/2022 10  8 - 23 mg/dL Final    Creatinine 12/29/2022 0.8  0.5 - 1.4 mg/dL Final    Calcium 12/29/2022 9.5  8.7 - 10.5 mg/dL Final    Total Protein 12/29/2022 7.7  6.0 - 8.4 g/dL Final    Albumin 12/29/2022 3.0 (L)  3.5 - 5.2 g/dL Final    Total Bilirubin 12/29/2022 0.8  0.1 - 1.0 mg/dL Final    Comment: For infants and newborns, interpretation of results should be based  on gestational age, weight and in agreement with clinical  observations.    Premature Infant  recommended reference ranges:  Up to 24 hours.............<8.0 mg/dL  Up to 48 hours............<12.0 mg/dL  3-5 days..................<15.0 mg/dL  6-29 days.................<15.0 mg/dL      Alkaline Phosphatase 12/29/2022 107  55 - 135 U/L Final    AST 12/29/2022 35  10 - 40 U/L Final    ALT 12/29/2022 12  10 - 44 U/L Final    Anion Gap 12/29/2022 9  8 - 16 mmol/L Final    eGFR 12/29/2022 >60.0  >60 mL/min/1.73 m^2 Final         Assessment and Plan        1. 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 Anemia associated with chemotherapy    4. Nutritional counseling    5. Immunodeficiency secondary to neoplasm    6. Primary hypertension    7. MARIA A (acute kidney injury)        1,2.  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  Guardant 360 was negative for BRAF, VIRI, HER2 amplification.   Pretreatment CEA 57.  We had a long and sonia discussion with him about his diagnosis. Unfortunately, the disease is not curable but remains treatable and he has good performance status.   Restaging scans after 7 cycles of FOLFOX + Pema showed significant reduction in colonic mass and liver mass.   we switched to maintenance as of cycle 8 with 5FU + Pema  Restaging scans from March 2022 show stable disease.   MRI on 7/18/22 showing hepatic progression of disease in the right hepatic lobe noted on the exam. CT CAP on 8/26 shows some mild progression in both liver metastases.    Discussed case at colorectal tumor board 8/31/22 and had a diagnostic lap performed by Dr. Rodriguez on 9/7 to assess for any occult peritoneal disease. Findings from the diagnostic lap were negative. Fluid from around from around the primary mass was sent for cytology that was negative for malignancy.   Underwent primary tumor resection on 10/20/22 with Dr. Bonilla.    Meanwhile his liver mets have grown.  We discussed his case at CRC conference with plans for short term Y-90 and then  restarting chemotherapy prior to considering any surgical options to his liver metastases.  He has undergone Y-90 mapping and is undergoing delivery on 12/30/22.    Labs reviewed.  Acceptable to proceed with cycle 39 today of FOLFOX.  Monitor CEA. Repeat imaging following this cycle. Based on those results we wll consider re-evaluation for liver resection.    RTC in 2 weeks for next cycle.    3. Hypertension   --Well Controlled.   --continue with Amlodipine .   --Following with PCP.     4. MARIA A  --Improved renal function. Cr normal.  Monitor.    5. Anemia  -- Stable. Monitor.  -- No signs of bleeding. Platelets normal.     6-8. Neoplasm related pain, weight loss, constipation  -- Will continue with pain management at this time as pain control is improved.  -- Following with nutrition.  Kaylyn to see after our visit.   -- Continue Miralax daily for constipation.    9. Urinary hesitancy  -- Continue Flomax.      Kimo Oh D.O.  Hematology/Oncology Fellow, PGY-IV       Route Chart for Scheduling    Med Onc Chart Routing      Follow up with physician    Follow up with RACHEL 2 weeks. Already scheduled   Infusion scheduling note    Injection scheduling note    Labs   Lab interval:  2 weeks, already scheduled   Imaging CT chest abdomen pelvis   Please schedule CT C/A/P prior to next clinic visit   Pharmacy appointment    Other referrals        Treatment Plan Information   OP COLORECTAL FOLFOX + BEVACIZUMAB Q2W   Torres Pantoja MD   Upcoming Treatment Dates - OP COLORECTAL FOLFOX + BEVACIZUMAB Q2W    12/30/2022       Chemotherapy       oxaliplatin (ELOXATIN) 85 mg/m2 = 127 mg in dextrose 5 % chemo infusion       fluorouraciL 2,400 mg/m2 = 3,575 mg in sodium chloride 0.9% 100 mL chemo infusion       Antiemetics       palonosetron (ALOXI) 0.25 mg with Dexamethasone (DECADRON) 12 mg in NS 50 mL IVPB  1/13/2023       Chemotherapy       oxaliplatin (ELOXATIN) in dextrose 5 % chemo infusion       fluorouraciL in sodium  chloride 0.9% 100 mL chemo infusion       Antiemetics       palonosetron 0.25mg/dexAMETHasone 12mg in NS IVPB  1/27/2023       Chemotherapy       oxaliplatin (ELOXATIN) in dextrose 5 % chemo infusion       fluorouraciL in sodium chloride 0.9% 100 mL chemo infusion       Antiemetics       palonosetron 0.25mg/dexAMETHasone 12mg in NS IVPB    Therapy Plan Information  IV Fluids  sodium chloride 0.9% bolus 1,000 mL  1,000 mL, Intravenous, Every visit  Flushes  sodium chloride 0.9% flush 10 mL  10 mL, Intravenous, PRN  heparin, porcine (PF) 100 unit/mL injection flush 500 Units  500 Units, Intravenous, PRN

## 2022-12-30 ENCOUNTER — HOSPITAL ENCOUNTER (OUTPATIENT)
Dept: RADIOLOGY | Facility: HOSPITAL | Age: 72
Discharge: HOME | End: 2022-12-30
Attending: FAMILY MEDICINE
Payer: MEDICARE

## 2022-12-30 ENCOUNTER — HOSPITAL ENCOUNTER (OUTPATIENT)
Dept: INTERVENTIONAL RADIOLOGY/VASCULAR | Facility: HOSPITAL | Age: 72
Discharge: HOME | End: 2022-12-30
Attending: FAMILY MEDICINE
Payer: MEDICARE

## 2022-12-30 VITALS
BODY MASS INDEX: 17.27 KG/M2 | HEIGHT: 67 IN | DIASTOLIC BLOOD PRESSURE: 80 MMHG | OXYGEN SATURATION: 100 % | WEIGHT: 110 LBS | RESPIRATION RATE: 21 BRPM | TEMPERATURE: 99 F | HEART RATE: 61 BPM | SYSTOLIC BLOOD PRESSURE: 121 MMHG

## 2022-12-30 DIAGNOSIS — C78.7 COLON CANCER METASTASIZED TO LIVER: ICD-10-CM

## 2022-12-30 DIAGNOSIS — C18.9 COLON CANCER METASTASIZED TO LIVER: ICD-10-CM

## 2022-12-30 PROCEDURE — 63600175 PHARM REV CODE 636 W HCPCS: Performed by: RADIOLOGY

## 2022-12-30 PROCEDURE — 99153 MOD SED SAME PHYS/QHP EA: CPT | Performed by: RADIOLOGY

## 2022-12-30 PROCEDURE — 76380 PR  CT SCAN,LIMITED/LOCALIZED F/U STUDY: ICD-10-PCS | Mod: 26,59,, | Performed by: RADIOLOGY

## 2022-12-30 PROCEDURE — 78830 RP LOCLZJ TUM SPECT W/CT 1: CPT | Mod: 26,,, | Performed by: RADIOLOGY

## 2022-12-30 PROCEDURE — 78830 RP LOCLZJ TUM SPECT W/CT 1: CPT | Mod: TC,59

## 2022-12-30 PROCEDURE — 76377 PR  3D RENDERING W/ IMAGE POSTPROCESS: ICD-10-PCS | Mod: 26,,, | Performed by: RADIOLOGY

## 2022-12-30 PROCEDURE — 76377 3D RENDER W/INTRP POSTPROCES: CPT | Mod: TC,59 | Performed by: RADIOLOGY

## 2022-12-30 PROCEDURE — 76380 CAT SCAN FOLLOW-UP STUDY: CPT | Mod: 26,59,, | Performed by: RADIOLOGY

## 2022-12-30 PROCEDURE — 79445 PR  NUCLEAR THERAPY, INTRA-ARTERIAL: ICD-10-PCS | Mod: 26,,, | Performed by: RADIOLOGY

## 2022-12-30 PROCEDURE — 75774 ARTERY X-RAY EACH VESSEL: CPT | Mod: TC,59 | Performed by: RADIOLOGY

## 2022-12-30 PROCEDURE — 76380 CAT SCAN FOLLOW-UP STUDY: CPT | Mod: TC,59 | Performed by: RADIOLOGY

## 2022-12-30 PROCEDURE — 75887 VEIN X-RAY LIVER W/O HEMODYN: CPT | Mod: 26,,, | Performed by: RADIOLOGY

## 2022-12-30 PROCEDURE — 75774 ARTERY X-RAY EACH VESSEL: CPT | Mod: 26,59,, | Performed by: RADIOLOGY

## 2022-12-30 PROCEDURE — 36248 INS CATH ABD/L-EXT ART ADDL: CPT | Mod: RT,,, | Performed by: RADIOLOGY

## 2022-12-30 PROCEDURE — 36247 PR PLACE CATH SUBSUBSELECT ART,ABD/PEL: ICD-10-PCS | Mod: 51,RT,, | Performed by: RADIOLOGY

## 2022-12-30 PROCEDURE — 77263 PR  RADIATION THERAPY PLAN COMPLEX: ICD-10-PCS | Mod: ,,, | Performed by: RADIOLOGY

## 2022-12-30 PROCEDURE — 75887 PR  PERCUT XHEPATIC PORTOGRAM: ICD-10-PCS | Mod: 26,,, | Performed by: RADIOLOGY

## 2022-12-30 PROCEDURE — 75726 ARTERY X-RAYS ABDOMEN: CPT | Mod: TC,59 | Performed by: RADIOLOGY

## 2022-12-30 PROCEDURE — 27201082 IR EMBOLIZATION COMP FOR TUMOR_ORGAN ISCHEMIA_INFARC

## 2022-12-30 PROCEDURE — 77263 THER RADIOLOGY TX PLNG CPLX: CPT | Mod: ,,, | Performed by: RADIOLOGY

## 2022-12-30 PROCEDURE — 75887 VEIN X-RAY LIVER W/O HEMODYN: CPT | Mod: TC | Performed by: RADIOLOGY

## 2022-12-30 PROCEDURE — 75726 ARTERY X-RAYS ABDOMEN: CPT | Mod: 26,59,, | Performed by: RADIOLOGY

## 2022-12-30 PROCEDURE — 36248 PR PR INS CATH ABD/L-EXT ART ADDL 2ND ORD/3RD ORD/BYD: ICD-10-PCS | Mod: RT,,, | Performed by: RADIOLOGY

## 2022-12-30 PROCEDURE — 75774 PR  ANGIO EA ADDNL SELECTV VESSEL: ICD-10-PCS | Mod: 26,59,, | Performed by: RADIOLOGY

## 2022-12-30 PROCEDURE — 36248 INS CATH ABD/L-EXT ART ADDL: CPT | Mod: RT | Performed by: RADIOLOGY

## 2022-12-30 PROCEDURE — 25500020 PHARM REV CODE 255: Performed by: FAMILY MEDICINE

## 2022-12-30 PROCEDURE — 36247 INS CATH ABD/L-EXT ART 3RD: CPT | Mod: 51,RT,, | Performed by: RADIOLOGY

## 2022-12-30 PROCEDURE — 76937 US GUIDE VASCULAR ACCESS: CPT | Mod: 26,RT,, | Performed by: RADIOLOGY

## 2022-12-30 PROCEDURE — 76937 US GUIDE VASCULAR ACCESS: CPT | Mod: TC | Performed by: RADIOLOGY

## 2022-12-30 PROCEDURE — 79445 NUCLEAR RX INTRA-ARTERIAL: CPT | Mod: 26,,, | Performed by: RADIOLOGY

## 2022-12-30 PROCEDURE — 37243 IR EMBOLIZATION COMP FOR TUMOR_ORGAN ISCHEMIA_INFARC: ICD-10-PCS | Mod: ,,, | Performed by: RADIOLOGY

## 2022-12-30 PROCEDURE — 76377 3D RENDER W/INTRP POSTPROCES: CPT | Mod: 26,,, | Performed by: RADIOLOGY

## 2022-12-30 PROCEDURE — 79445 NUCLEAR RX INTRA-ARTERIAL: CPT | Mod: TC | Performed by: RADIOLOGY

## 2022-12-30 PROCEDURE — 78830 NM SPECT/CT SINGLE AREA: ICD-10-PCS | Mod: 26,,, | Performed by: RADIOLOGY

## 2022-12-30 PROCEDURE — G0269 OCCLUSIVE DEVICE IN VEIN ART: HCPCS | Performed by: RADIOLOGY

## 2022-12-30 PROCEDURE — 78201 LIVER IMAGING STATIC ONLY: CPT | Mod: TC,59

## 2022-12-30 PROCEDURE — 36247 INS CATH ABD/L-EXT ART 3RD: CPT | Mod: RT | Performed by: RADIOLOGY

## 2022-12-30 PROCEDURE — 78201 LIVER IMAGING STATIC ONLY: CPT | Mod: 26,59,, | Performed by: RADIOLOGY

## 2022-12-30 PROCEDURE — 37243 VASC EMBOLIZE/OCCLUDE ORGAN: CPT | Performed by: RADIOLOGY

## 2022-12-30 PROCEDURE — 25000003 PHARM REV CODE 250: Performed by: RADIOLOGY

## 2022-12-30 PROCEDURE — 77300 RADIATION THERAPY DOSE PLAN: CPT | Mod: TC | Performed by: RADIOLOGY

## 2022-12-30 PROCEDURE — 75726 CHG ANGIO VISCERAL SELECTV/SUBSELEC: ICD-10-PCS | Mod: 26,59,, | Performed by: RADIOLOGY

## 2022-12-30 PROCEDURE — A4215 STERILE NEEDLE: HCPCS

## 2022-12-30 PROCEDURE — 76937 PR  US GUIDE, VASCULAR ACCESS: ICD-10-PCS | Mod: 26,RT,, | Performed by: RADIOLOGY

## 2022-12-30 PROCEDURE — 78201 NM LIVER IMAGING STATIC POST Y-90 EMBOLIZATION: ICD-10-PCS | Mod: 26,59,, | Performed by: RADIOLOGY

## 2022-12-30 PROCEDURE — 99152 MOD SED SAME PHYS/QHP 5/>YRS: CPT | Performed by: RADIOLOGY

## 2022-12-30 RX ORDER — LIDOCAINE HYDROCHLORIDE 10 MG/ML
1 INJECTION, SOLUTION EPIDURAL; INFILTRATION; INTRACAUDAL; PERINEURAL ONCE AS NEEDED
Status: DISCONTINUED | OUTPATIENT
Start: 2022-12-30 | End: 2022-12-31 | Stop reason: HOSPADM

## 2022-12-30 RX ORDER — DEXAMETHASONE SODIUM PHOSPHATE 100 MG/10ML
INJECTION INTRAMUSCULAR; INTRAVENOUS
Status: COMPLETED | OUTPATIENT
Start: 2022-12-30 | End: 2022-12-30

## 2022-12-30 RX ORDER — SODIUM CHLORIDE 9 MG/ML
INJECTION, SOLUTION INTRAVENOUS CONTINUOUS
Status: DISCONTINUED | OUTPATIENT
Start: 2022-12-30 | End: 2022-12-31 | Stop reason: HOSPADM

## 2022-12-30 RX ORDER — FENTANYL CITRATE 50 UG/ML
INJECTION, SOLUTION INTRAMUSCULAR; INTRAVENOUS
Status: COMPLETED | OUTPATIENT
Start: 2022-12-30 | End: 2022-12-30

## 2022-12-30 RX ORDER — MIDAZOLAM HYDROCHLORIDE 1 MG/ML
INJECTION INTRAMUSCULAR; INTRAVENOUS
Status: COMPLETED | OUTPATIENT
Start: 2022-12-30 | End: 2022-12-30

## 2022-12-30 RX ORDER — ONDANSETRON 2 MG/ML
4 INJECTION INTRAMUSCULAR; INTRAVENOUS EVERY 6 HOURS PRN
Status: DISCONTINUED | OUTPATIENT
Start: 2022-12-30 | End: 2022-12-31 | Stop reason: HOSPADM

## 2022-12-30 RX ORDER — HEPARIN SOD,PORCINE/0.9 % NACL 1000/500ML
INTRAVENOUS SOLUTION INTRAVENOUS
Status: COMPLETED | OUTPATIENT
Start: 2022-12-30 | End: 2022-12-30

## 2022-12-30 RX ORDER — LIDOCAINE HYDROCHLORIDE 10 MG/ML
INJECTION INFILTRATION; PERINEURAL
Status: COMPLETED | OUTPATIENT
Start: 2022-12-30 | End: 2022-12-30

## 2022-12-30 RX ADMIN — MIDAZOLAM HYDROCHLORIDE 0.5 MG: 1 INJECTION INTRAMUSCULAR; INTRAVENOUS at 01:12

## 2022-12-30 RX ADMIN — IOHEXOL 90 ML: 300 INJECTION, SOLUTION INTRAVENOUS at 01:12

## 2022-12-30 RX ADMIN — FENTANYL CITRATE 25 MCG: 50 INJECTION, SOLUTION INTRAMUSCULAR; INTRAVENOUS at 01:12

## 2022-12-30 RX ADMIN — FENTANYL CITRATE 50 MCG: 50 INJECTION, SOLUTION INTRAMUSCULAR; INTRAVENOUS at 12:12

## 2022-12-30 RX ADMIN — LIDOCAINE HYDROCHLORIDE 5 ML: 10 INJECTION, SOLUTION INFILTRATION; PERINEURAL at 12:12

## 2022-12-30 RX ADMIN — DEXAMETHASONE SODIUM PHOSPHATE 20 MG: 10 INJECTION INTRAMUSCULAR; INTRAVENOUS at 01:12

## 2022-12-30 RX ADMIN — MIDAZOLAM HYDROCHLORIDE 1 MG: 1 INJECTION INTRAMUSCULAR; INTRAVENOUS at 12:12

## 2022-12-30 RX ADMIN — HEPARIN SODIUM IN SODIUM CHLORIDE 1000 ML: 200 INJECTION INTRAVENOUS at 12:12

## 2022-12-30 NOTE — PLAN OF CARE
Pt arrived to unit accompanied by his daughter.  R chest port accessed.  Pt has home infusion of what he states is chemo infusing.  Pre op orders and assessment initiated.  Pt remains npo.  Call bell within reach.

## 2022-12-30 NOTE — DISCHARGE INSTRUCTIONS
[facility] [phone] (MON-FRI 8 AM- 5PM). Radiology Resident on call [phone].  Discharge Instructions for Hepatic Angiography  You had a procedure called hepatic angiography. This is an X-ray study of the blood vessels that supply your liver. During  the procedure, a catheter (thin, flexible tube) was inserted into one of your blood vessels through a small incision. A  specially trained doctor called an interventional radiologist usually does the procedure. Heres what to do at home  afterward.  Home care  Follow your doctor's recommendations on when it is safe to drive after the procedure.  Rest according to your doctor's instructions after the procedure. Most people are able to resume normal activity  within a few days.  Dont lift anything heavier than 10 pounds for 3 to 4 days.  Avoid strenuous activity for 2 weeks after the procedure.  Exercise according to your doctors recommendations.  You can shower the day after the procedure.  Ask your doctor when it is safe to swim or take a bath.  Take your medications exactly as directed. Dont skip doses.  Unless directed otherwise, drink 6 to 8 glasses of water a day to prevent dehydration and to help flush your body of  the dye that was used during your procedure.  Take your temperature and check the place where your incision was made for signs of infection (redness, swelling, bleeding or  warmth) every day for a week.  Report any numbness or coolness to the extremity. Report any discoloration to the puncture site or the extremity.  Follow-up care  Make a follow-up appointment as directed by our staff.  Ask your doctor when you can return to work.  Date Last Reviewed: 6/14/2015  © 9085-7154 [employer]. [address] [place] [address], [place] [address]. All rights reserved. This information  is not intended as a substitute for professional medical care. Always follow your healthcare professional's instructions. Post Yttrium (Y-90) Instructions    Pregnant  women should be no closer to the patient than 3 feet for a period of 3 days.    Children under 10 years of age should be no closer than 3 feet for 3 days, although brief contact with the patient for a few seconds is acceptable.

## 2022-12-30 NOTE — DISCHARGE SUMMARY
Radiology Discharge Summary      Hospital Course: No complications    Admit Date: 12/30/2022  Discharge Date: 12/30/2022     Instructions Given to Patient: Yes  Diet: Resume prior diet  Activity: activity as tolerated and no driving for today    Description of Condition on Discharge: Stable  Vital Signs (Most Recent): Temp: 99 °F (37.2 °C) (12/30/22 0911)  Pulse: 65 (12/30/22 1330)  Resp: 16 (12/30/22 1330)  BP: 112/74 (12/30/22 1330)  SpO2: 100 % (12/30/22 1330)    Discharge Disposition: Home    Discharge Diagnosis: mCRC s/p Y90 delivery    Follow up: As scheduled    Raul Vidal M.D.  Interventional Radiology  Department of Radiology  Pager: [phone]

## 2022-12-30 NOTE — PLAN OF CARE
Pt arrived to  for Y-90. Pt oriented to unit and staff. Plan of care reviewed with patient, patient verbalizes understanding. Comfort measures utilized. Pt safely transferred from stretcher to procedural table. Fall risk reviewed with patient, fall risk interventions maintained. Safety strap applied, positioner pillows utilized to minimize pressure points. Blankets applied. Pt prepped and draped utilizing standard sterile technique. Patient placed on continuous monitoring, as required by sedation policy. Timeouts completed utilizing standard universal time-out, per department and facility policy. RN to remain at bedside, continuous monitoring maintained. Pt resting comfortably. Denies pain/discomfort. Will continue to monitor. See flow sheets for monitoring, medication administration, and updates.

## 2022-12-30 NOTE — H&P
Outpatient Radiology Pre-procedure Note    History of Present Illness:  Javier Downs is a 72 y.o. male with history of colorectal cancer s/p hemicolectomy, with large right hepatic lobe metastatic disease burden. Had preprocedure angiography on 11/8/22. He presents today for Yttrium 90 radioembolizatin.   Admission H&P reviewed.  Past Medical History:   Diagnosis Date    MARIA A (acute kidney injury) 8/18/2022    Hypertension     Metastatic colon cancer to liver 4/22/2021     Past Surgical History:   Procedure Laterality Date    COLONOSCOPY N/A 4/20/2021    Procedure: COLONOSCOPY with possible stent;  Surgeon: EDILMA Bonilla MD;  Location: [facility] ENDO (2ND FLR);  Service: Colon and Rectal;  Laterality: N/A;    DIAGNOSTIC LAPAROSCOPY N/A 9/7/2022    Procedure: LAPAROSCOPY, DIAGNOSTIC;  Surgeon: Adrian Rodriguez MD;  Location: [facility] OR 2ND FLR;  Service: General;  Laterality: N/A;    INSERTION OF TUNNELED CENTRAL VENOUS CATHETER (CVC) WITH SUBCUTANEOUS PORT N/A 5/3/2021    Procedure: [id number];  Surgeon: Francisco Layton MD;  Location: [facility] OR 2ND FLR;  Service: Vascular;  Laterality: N/A;    OMENTECTOMY N/A 10/20/2022    Procedure: OMENTECTOMY;  Surgeon: EDILMA Bonilla MD;  Location: [facility] OR 2ND FLR;  Service: Colon and Rectal;  Laterality: N/A;    RIGHT HEMICOLECTOMY N/A 10/20/2022    Procedure: HEMICOLECTOMY, RIGHT, extended;  Surgeon: EDILMA Bonilla MD;  Location: [facility] OR 2ND FLR;  Service: Colon and Rectal;  Laterality: N/A;  Extended right hemicolectomy CONSENT IN AM       Review of Systems:   As documented in primary team H&P    Home Meds:   Prior to Admission medications    Medication Sig Start Date End Date Taking? Authorizing Provider   acetaminophen (TYLENOL) 500 MG tablet Take 1 tablet (500 mg total) by mouth every 6 (six) hours as needed for Pain (alternate with ibuprofen). 5/3/21  Yes FRANKLIN Delarosa MD   amLODIPine (NORVASC) 10 MG tablet Take 1 tablet (10 mg total) by mouth once daily.  8/3/22 3/22/23 Yes Anali Hernandez PA-C   tamsulosin (FLOMAX) 0.4 mg Cap Take 1 capsule (0.4 mg total) by mouth once daily. 11/16/22 11/16/23 Yes Bunny Schuster MD   ibuprofen (ADVIL,MOTRIN) 400 MG tablet Take 1 tablet (400 mg total) by mouth every 6 (six) hours as needed for Other (pain). Alternate with tylenol 5/3/21   FRANKLIN Delarosa MD   ondansetron (ZOFRAN) 4 MG tablet Take 1 tablet (4 mg total) by mouth every 8 (eight) hours as needed for Nausea. 4/21/21   Thomas Monroe MD   oxyCODONE (ROXICODONE) 5 MG immediate release tablet Take 1 tablet (5 mg total) by mouth every 6 (six) hours as needed for Pain. 11/2/22   Elsy Kelly NP     Scheduled Meds:   Continuous Infusions:    sodium chloride 0.9%       PRN Meds:LIDOcaine (PF) 10 mg/ml (1%)  Anticoagulants/Antiplatelets: no anticoagulation    Allergies: Review of patient's allergies indicates:  No Known Allergies  Sedation Hx: have not been any systemic reactions    Labs:  Recent Labs   Lab 12/30/22 0819   INR 1.1       Recent Labs   Lab 12/30/22 0819   WBC 11.79   HGB 11.2*   HCT 37.6*   MCV 90         Recent Labs   Lab 12/30/22 0819   *      K 4.6      CO2 25   BUN 14   CREATININE 0.9   CALCIUM 10.4   ALT 14   AST 31   ALBUMIN 3.4*   BILITOT 0.7   BILIDIR 0.3         Vitals:  Temp: 99 °F (37.2 °C) (12/30/22 0911)  Pulse: 72 (12/30/22 0911)  Resp: 18 (12/30/22 0911)  BP: 107/64 (12/30/22 0915)  SpO2: 100 % (12/30/22 0911)     Physical Exam:  ASA: 3  Mallampati: 3    General: no acute distress  Mental Status: alert and oriented to person, place and time  HEENT: normocephalic, atraumatic  Chest: unlabored breathing  Heart: regular heart rate  Abdomen: nondistended  Extremity: moves all extremities    Plan: Yttrium 90 radioembolization  Sedation Plan: moderate    Michelle Enriquez MD PhD  Radiology Resident

## 2022-12-30 NOTE — PLAN OF CARE
2hr IR recovery complete. VSS. Dressing CDI. IV removed. Discharge instructions reviewed and given. Pt. Awaiting transport to garage via wheelchair with daughter.

## 2022-12-30 NOTE — PROCEDURES
Radiology Post-Procedure Note    Pre Op Diagnosis: mCRC    Post Op Diagnosis: Same    Procedure: Y90 delivery    Procedure performed by: Raul Vidal MD    Written Informed Consent Obtained: Yes  Specimen Removed: NO  Estimated Blood Loss: Minimal    Findings:   Via rt cfa, celiac, post div rha, s6 rha, and s7 rha selected and angiograms obtained. Y90 delivered according to WD. Vascade to rt cfa. No complications. See dictation.    Patient tolerated procedure well.    Raul Vidal M.D.  Interventional Radiology  Department of Radiology  Pager: [phone]

## 2022-12-30 NOTE — PLAN OF CARE
Y-90 procedure completed. Patient tolerated well. Patient AAOx3, no distress noted, respirations even and unlabored, will continue to monitor. VSS. 5 F vascade closure device deployed in right  femoral artery; hemostasis achieved at 1325. Patient to lay flat for 2 hours until 1525 on 12/30/22 per MD. Right groin site clean, dry, and intact; no bleeding or hematoma noted. Patient to be transferred to nuclear medicine for post-procedural imaging then to MPU for recovery per MD. Report to be given at bedside to RN.

## 2022-12-31 ENCOUNTER — INFUSION (OUTPATIENT)
Dept: INFUSION THERAPY | Facility: HOSPITAL | Age: 72
End: 2022-12-31
Payer: MEDICARE

## 2022-12-31 VITALS
RESPIRATION RATE: 18 BRPM | SYSTOLIC BLOOD PRESSURE: 105 MMHG | TEMPERATURE: 98 F | HEART RATE: 91 BPM | OXYGEN SATURATION: 100 % | DIASTOLIC BLOOD PRESSURE: 69 MMHG

## 2022-12-31 DIAGNOSIS — C18.9 METASTATIC COLON CANCER TO LIVER: Primary | ICD-10-CM

## 2022-12-31 DIAGNOSIS — C78.7 METASTATIC COLON CANCER TO LIVER: Primary | ICD-10-CM

## 2022-12-31 PROCEDURE — 96374 THER/PROPH/DIAG INJ IV PUSH: CPT

## 2022-12-31 PROCEDURE — 25000003 PHARM REV CODE 250: Performed by: INTERNAL MEDICINE

## 2022-12-31 PROCEDURE — 63600175 PHARM REV CODE 636 W HCPCS: Performed by: INTERNAL MEDICINE

## 2022-12-31 PROCEDURE — A4216 STERILE WATER/SALINE, 10 ML: HCPCS | Performed by: INTERNAL MEDICINE

## 2022-12-31 RX ORDER — SODIUM CHLORIDE 0.9 % (FLUSH) 0.9 %
10 SYRINGE (ML) INJECTION
Status: DISCONTINUED | OUTPATIENT
Start: 2022-12-31 | End: 2022-12-31 | Stop reason: HOSPADM

## 2022-12-31 RX ORDER — HEPARIN 100 UNIT/ML
500 SYRINGE INTRAVENOUS
Status: DISCONTINUED | OUTPATIENT
Start: 2022-12-31 | End: 2022-12-31 | Stop reason: HOSPADM

## 2022-12-31 RX ORDER — DIPHENHYDRAMINE HYDROCHLORIDE 50 MG/ML
25 INJECTION INTRAMUSCULAR; INTRAVENOUS
Status: COMPLETED | OUTPATIENT
Start: 2022-12-31 | End: 2022-12-31

## 2022-12-31 RX ADMIN — HEPARIN 500 UNITS: 100 SYRINGE at 11:12

## 2022-12-31 RX ADMIN — Medication 10 ML: at 11:12

## 2022-12-31 RX ADMIN — DIPHENHYDRAMINE HYDROCHLORIDE 25 MG: 50 INJECTION, SOLUTION INTRAMUSCULAR; INTRAVENOUS at 11:12

## 2022-12-31 NOTE — PLAN OF CARE
1105-Pt ambulatory to clinic today for pump d/c. Denies any complaints. Pump completed prior to arrival. Port deaccessed after flushing. Ambulatory from clinic in [facility].

## 2023-01-04 DIAGNOSIS — C78.7 METASTATIC COLON CANCER TO LIVER: Primary | ICD-10-CM

## 2023-01-04 DIAGNOSIS — C18.9 METASTATIC COLON CANCER TO LIVER: Primary | ICD-10-CM

## 2023-01-12 ENCOUNTER — INFUSION (OUTPATIENT)
Dept: INFUSION THERAPY | Facility: HOSPITAL | Age: 73
End: 2023-01-12
Payer: MEDICARE

## 2023-01-12 ENCOUNTER — OFFICE VISIT (OUTPATIENT)
Dept: HEMATOLOGY/ONCOLOGY | Facility: CLINIC | Age: 73
End: 2023-01-12
Payer: MEDICARE

## 2023-01-12 VITALS
BODY MASS INDEX: 16.04 KG/M2 | DIASTOLIC BLOOD PRESSURE: 67 MMHG | HEIGHT: 67 IN | SYSTOLIC BLOOD PRESSURE: 110 MMHG | WEIGHT: 102.19 LBS | TEMPERATURE: 98 F | RESPIRATION RATE: 17 BRPM | HEART RATE: 77 BPM | OXYGEN SATURATION: 100 %

## 2023-01-12 VITALS
BODY MASS INDEX: 16.04 KG/M2 | HEART RATE: 78 BPM | WEIGHT: 102.19 LBS | SYSTOLIC BLOOD PRESSURE: 109 MMHG | OXYGEN SATURATION: 100 % | DIASTOLIC BLOOD PRESSURE: 71 MMHG | HEIGHT: 67 IN | TEMPERATURE: 98 F | RESPIRATION RATE: 18 BRPM

## 2023-01-12 DIAGNOSIS — N17.9 AKI (ACUTE KIDNEY INJURY): ICD-10-CM

## 2023-01-12 DIAGNOSIS — E43 SEVERE MALNUTRITION: ICD-10-CM

## 2023-01-12 DIAGNOSIS — C78.7 METASTATIC COLON CANCER TO LIVER: Primary | ICD-10-CM

## 2023-01-12 DIAGNOSIS — T45.1X5A ANEMIA ASSOCIATED WITH CHEMOTHERAPY: ICD-10-CM

## 2023-01-12 DIAGNOSIS — R63.4 WEIGHT DECREASE: ICD-10-CM

## 2023-01-12 DIAGNOSIS — I10 PRIMARY HYPERTENSION: ICD-10-CM

## 2023-01-12 DIAGNOSIS — Z79.899 IMMUNODEFICIENCY DUE TO CHEMOTHERAPY: ICD-10-CM

## 2023-01-12 DIAGNOSIS — D84.81 IMMUNODEFICIENCY SECONDARY TO NEOPLASM: ICD-10-CM

## 2023-01-12 DIAGNOSIS — C18.9 METASTATIC COLON CANCER TO LIVER: Primary | ICD-10-CM

## 2023-01-12 DIAGNOSIS — T45.1X5A IMMUNODEFICIENCY DUE TO CHEMOTHERAPY: ICD-10-CM

## 2023-01-12 DIAGNOSIS — R39.9 LOWER URINARY TRACT SYMPTOMS (LUTS): ICD-10-CM

## 2023-01-12 DIAGNOSIS — K59.03 DRUG-INDUCED CONSTIPATION: ICD-10-CM

## 2023-01-12 DIAGNOSIS — R52 PAIN: ICD-10-CM

## 2023-01-12 DIAGNOSIS — D64.81 ANEMIA ASSOCIATED WITH CHEMOTHERAPY: ICD-10-CM

## 2023-01-12 DIAGNOSIS — D84.821 IMMUNODEFICIENCY DUE TO CHEMOTHERAPY: ICD-10-CM

## 2023-01-12 DIAGNOSIS — D49.9 IMMUNODEFICIENCY SECONDARY TO NEOPLASM: ICD-10-CM

## 2023-01-12 PROCEDURE — 96413 CHEMO IV INFUSION 1 HR: CPT

## 2023-01-12 PROCEDURE — 96415 CHEMO IV INFUSION ADDL HR: CPT

## 2023-01-12 PROCEDURE — 96367 TX/PROPH/DG ADDL SEQ IV INF: CPT

## 2023-01-12 PROCEDURE — 99999 PR PBB SHADOW E&M-EST. PATIENT-LVL IV: CPT | Mod: PBBFAC,,, | Performed by: REGISTERED NURSE

## 2023-01-12 PROCEDURE — 99215 OFFICE O/P EST HI 40 MIN: CPT | Mod: S$GLB,,, | Performed by: REGISTERED NURSE

## 2023-01-12 PROCEDURE — 25000003 PHARM REV CODE 250: Performed by: REGISTERED NURSE

## 2023-01-12 PROCEDURE — 96375 TX/PRO/DX INJ NEW DRUG ADDON: CPT

## 2023-01-12 PROCEDURE — 99214 OFFICE O/P EST MOD 30 MIN: CPT | Mod: PBBFAC,25 | Performed by: REGISTERED NURSE

## 2023-01-12 PROCEDURE — 63600175 PHARM REV CODE 636 W HCPCS: Performed by: REGISTERED NURSE

## 2023-01-12 PROCEDURE — 99215 PR OFFICE/OUTPT VISIT, EST, LEVL V, 40-54 MIN: ICD-10-PCS | Mod: S$GLB,,, | Performed by: REGISTERED NURSE

## 2023-01-12 PROCEDURE — 99999 PR PBB SHADOW E&M-EST. PATIENT-LVL IV: ICD-10-PCS | Mod: PBBFAC,,, | Performed by: REGISTERED NURSE

## 2023-01-12 PROCEDURE — 96416 CHEMO PROLONG INFUSE W/PUMP: CPT

## 2023-01-12 RX ORDER — HEPARIN 100 UNIT/ML
500 SYRINGE INTRAVENOUS
Status: DISCONTINUED | OUTPATIENT
Start: 2023-01-12 | End: 2023-01-12 | Stop reason: HOSPADM

## 2023-01-12 RX ORDER — DIPHENHYDRAMINE HYDROCHLORIDE 50 MG/ML
25 INJECTION INTRAMUSCULAR; INTRAVENOUS
Status: COMPLETED | OUTPATIENT
Start: 2023-01-12 | End: 2023-01-12

## 2023-01-12 RX ORDER — EPINEPHRINE 0.3 MG/.3ML
0.3 INJECTION SUBCUTANEOUS ONCE AS NEEDED
Status: CANCELLED | OUTPATIENT
Start: 2023-01-13

## 2023-01-12 RX ORDER — SODIUM CHLORIDE 0.9 % (FLUSH) 0.9 %
10 SYRINGE (ML) INJECTION
Status: CANCELLED | OUTPATIENT
Start: 2023-01-15

## 2023-01-12 RX ORDER — SODIUM CHLORIDE 0.9 % (FLUSH) 0.9 %
10 SYRINGE (ML) INJECTION
Status: CANCELLED | OUTPATIENT
Start: 2023-01-13

## 2023-01-12 RX ORDER — DIPHENHYDRAMINE HYDROCHLORIDE 50 MG/ML
50 INJECTION INTRAMUSCULAR; INTRAVENOUS ONCE AS NEEDED
Status: DISCONTINUED | OUTPATIENT
Start: 2023-01-12 | End: 2023-01-12 | Stop reason: HOSPADM

## 2023-01-12 RX ORDER — HEPARIN 100 UNIT/ML
500 SYRINGE INTRAVENOUS
Status: CANCELLED | OUTPATIENT
Start: 2023-01-15

## 2023-01-12 RX ORDER — DIPHENHYDRAMINE HYDROCHLORIDE 50 MG/ML
25 INJECTION INTRAMUSCULAR; INTRAVENOUS
Status: CANCELLED
Start: 2023-01-15

## 2023-01-12 RX ORDER — SODIUM CHLORIDE 0.9 % (FLUSH) 0.9 %
10 SYRINGE (ML) INJECTION
Status: DISCONTINUED | OUTPATIENT
Start: 2023-01-12 | End: 2023-01-12 | Stop reason: HOSPADM

## 2023-01-12 RX ORDER — HEPARIN 100 UNIT/ML
500 SYRINGE INTRAVENOUS
Status: CANCELLED | OUTPATIENT
Start: 2023-01-13

## 2023-01-12 RX ORDER — EPINEPHRINE 0.3 MG/.3ML
0.3 INJECTION SUBCUTANEOUS ONCE AS NEEDED
Status: DISCONTINUED | OUTPATIENT
Start: 2023-01-12 | End: 2023-01-12 | Stop reason: HOSPADM

## 2023-01-12 RX ORDER — DIPHENHYDRAMINE HYDROCHLORIDE 50 MG/ML
50 INJECTION INTRAMUSCULAR; INTRAVENOUS ONCE AS NEEDED
Status: CANCELLED | OUTPATIENT
Start: 2023-01-13

## 2023-01-12 RX ORDER — DIPHENHYDRAMINE HYDROCHLORIDE 50 MG/ML
25 INJECTION INTRAMUSCULAR; INTRAVENOUS
Status: CANCELLED
Start: 2023-01-13

## 2023-01-12 RX ADMIN — FLUOROURACIL 3550 MG: 50 INJECTION, SOLUTION INTRAVENOUS at 01:01

## 2023-01-12 RX ADMIN — DEXTROSE: 50 INJECTION, SOLUTION INTRAVENOUS at 09:01

## 2023-01-12 RX ADMIN — DEXAMETHASONE SODIUM PHOSPHATE 0.25 MG: 4 INJECTION, SOLUTION INTRA-ARTICULAR; INTRALESIONAL; INTRAMUSCULAR; INTRAVENOUS; SOFT TISSUE at 09:01

## 2023-01-12 RX ADMIN — DIPHENHYDRAMINE HYDROCHLORIDE 25 MG: 50 INJECTION, SOLUTION INTRAMUSCULAR; INTRAVENOUS at 09:01

## 2023-01-12 RX ADMIN — OXALIPLATIN 126 MG: 5 INJECTION, SOLUTION INTRAVENOUS at 11:01

## 2023-01-12 NOTE — PROGRESS NOTES
"[facility]  [facility]  Hematology/Medical Oncology Clinic     PATIENT: Javier Downs  MRN: [id number]  DATE: 1/12/2023    Diagnosis: Transverse colon metastatic cancer to the liver     Oncological history:   04/20/2021: metastatic colon cancer to the liver, moderately differentiated, pMMR  05/06/2021: C1 mFOLFOX + Pema  05/20/2021: C2 mFOLFOX + Pema  06/03/2021: C3 mFOLFOX + Pema   06/17/2021: C4 mFOLFOX + Pema  07/01/2021: C5 mFOLFOX + Pema  07/15/2021: C6 mFOLFOX + Pema  Restaging CT CAP: significant improvement of lesions   07/29/2021: C7 mFOLFOX + Pema   08/12/2021: Switched to maintenance  5FU+Pema (C8)  06/23/2022: C30 maintenance 5FU+Pema    Oncological History copied from medical chart:   Mr. Downs is a 72 y.o. male   70 years old pleasant AA gentleman, with history of hypertension, presenting with two weeks duration of abdominal cramps, diarrhea, nausea and vomiting. His symptoms started gradually with intermittent generalize crampy abdominal pain, worse with food. That was associated with nausea, reflux and occaisonal vomiting. He tried pepto-bismol and imodium with no relief, and hence he presented to ER.   He lost significant amount of weight, but cannot quantify the amount or the time period. He has also been feeling weaker than usual.   He hasn't seen a healthcare provider in over than 10 years. He has never had a colonoscopy.   In the ER. His labs were significant of microcytic anemia of 9.7 g/dl, MCV 77, and Platelets counts of 495. CT of the abdomen and pelvis showed " Large mass in the transverse colon highly suspicious for adenocarcinoma resulting in at least high-grade partial obstruction with dilation of proximal colon and small bowel. Soft tissue nodules in the adjacent mesentery are suspicious for pily metastases and/or mesenteric implants.  Numerous hepatic masses measuring up to 11.2 cm most likely related to metastatic disease.  There also several small " "subcapsular lesions which could reflect additional metastatic disease"   CT chest was negative to metastatic disease.   He underwent colonoscopy and stent placement. 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 HER 2 negative, VIRI/SHERI NGS was cancelled due to insufficient quantity   Guardant 360 was sent, negative for KRAS, NRAS, BRAF     He started palliative chemotherapy with FOLFOX+Pema     Restaging scans on 07/22/2021 showed significant decrease in size trasverse colon mass as well liver metastatic lesion.   He was switched to maintenance 5FU+Pema from C8    Restaging scans from 10/05/2021 (after C11) showing stable-mildly improved liver mets.   Restaging scans from 12/28/2021 (after C17) showing stable disease.   Restaging scans from 03/22/22 show stable disease.     MRI on 7/18/22    Impression:     Slight increase in size of the large hepatic metastasis when accounting for differences in imaging technique.  No new disease.     Remaining 2 liver lesions remain stable in size.  The larger lesion in the left hepatic lobe has features typical of a hemangioma.  Lesion at the hepatic dome is difficult to fully characterize due to location and breathing motion artifact, though may represent a hemangioma as well.  This can be followed.     Additional stable chronic findings as above.    Interval History:   He presents today with his daughter for follow-up prior to cycle 40 of FOLFOX. Doing very well overall. No acute complaints. Energy has been stable. Appetite overall doing good, but notes decreased intake r/t recent Y-90 procedure and sleeping more afterward. This has contributed to 8 lb weight loss. He is drinking protein shakes as tolerated. Y-90 went well and notes no significant pain, hematoma, or complications. 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 Bowel movements " have been regular with use of bowel regimen. Denies fever/chills, SOB, CP, palpitations, N/V/D, pain, blood in urine/stool.     ECOG status is 1.       Past Medical History:   Past Medical History:   Diagnosis Date    MARIA A (acute kidney injury) 8/18/2022    Hypertension     Metastatic colon cancer to liver 4/22/2021       Past Surgical HIstory:   Past Surgical History:   Procedure Laterality Date    COLONOSCOPY N/A 4/20/2021    Procedure: COLONOSCOPY with possible stent;  Surgeon: EDILMA Bonilla MD;  Location: [facility] ENDO (2ND FLR);  Service: Colon and Rectal;  Laterality: N/A;    DIAGNOSTIC LAPAROSCOPY N/A 9/7/2022    Procedure: LAPAROSCOPY, DIAGNOSTIC;  Surgeon: Adrian Rodriguez MD;  Location: [facility] OR 2ND FLR;  Service: General;  Laterality: N/A;    INSERTION OF TUNNELED CENTRAL VENOUS CATHETER (CVC) WITH SUBCUTANEOUS PORT N/A 5/3/2021    Procedure: [id number];  Surgeon: Francisco Layton MD;  Location: [facility] OR 2ND FLR;  Service: Vascular;  Laterality: N/A;    OMENTECTOMY N/A 10/20/2022    Procedure: OMENTECTOMY;  Surgeon: EDILMA Bonilla MD;  Location: [facility] OR 2ND FLR;  Service: Colon and Rectal;  Laterality: N/A;    RIGHT HEMICOLECTOMY N/A 10/20/2022    Procedure: HEMICOLECTOMY, RIGHT, extended;  Surgeon: EDILMA Bonilla MD;  Location: [facility] OR 2ND FLR;  Service: Colon and Rectal;  Laterality: N/A;  Extended right hemicolectomy CONSENT IN AM       Family History: History reviewed. No pertinent family history.    Social History:  reports that he has never smoked. He has never used smokeless tobacco. He reports current alcohol use.    Allergies:  Review of patient's allergies indicates:  No Known Allergies    Medications:  Current Outpatient Medications   Medication Sig Dispense Refill    acetaminophen (TYLENOL) 500 MG tablet Take 1 tablet (500 mg total) by mouth every 6 (six) hours as needed for Pain (alternate with ibuprofen).  0    amLODIPine (NORVASC) 10 MG tablet Take 1 tablet (10 mg total) by mouth  once daily. 90 tablet 3    ibuprofen (ADVIL,MOTRIN) 400 MG tablet Take 1 tablet (400 mg total) by mouth every 6 (six) hours as needed for Other (pain). Alternate with tylenol      ondansetron (ZOFRAN) 4 MG tablet Take 1 tablet (4 mg total) by mouth every 8 (eight) hours as needed for Nausea. 30 tablet 3    oxyCODONE (ROXICODONE) 5 MG immediate release tablet Take 1 tablet (5 mg total) by mouth every 6 (six) hours as needed for Pain. 20 tablet 0    tamsulosin (FLOMAX) 0.4 mg Cap Take 1 capsule (0.4 mg total) by mouth once daily. 30 capsule 5     No current facility-administered medications for this visit.     Facility-Administered Medications Ordered in Other Visits   Medication Dose Route Frequency Provider Last Rate Last Admin    alteplase injection 2 mg  2 mg Intra-Catheter PRN Natividad Maher, CNS        diphenhydrAMINE injection 50 mg  50 mg Intravenous Once PRN Natividad Maher, MCKAYLA        EPINEPHrine (EPIPEN) 0.3 mg/0.3 mL pen injection 0.3 mg  0.3 mg Intramuscular Once PRN Natividad Maher, MCKAYLA        fluorouraciL 2,400 mg/m2 = 3,550 mg in sodium chloride 0.9% 100 mL chemo infusion  2,400 mg/m2 Intravenous over 46 hr MCKAYLA Moran        heparin, porcine (PF) 100 unit/mL injection flush 500 Units  500 Units Intravenous PRN Natividad Maher, CNS        hydrocortisone sodium succinate injection 100 mg  100 mg Intravenous Once PRN MCKAYLA Moran        oxaliplatin (ELOXATIN) 85 mg/m2 = 126 mg in dextrose 5 % 310.2 mL chemo infusion  85 mg/m2 Intravenous 1 time in Clinic/HOD Natividad Maher, .1 mL/hr at 01/12/23 1113 126 mg at 01/12/23 1113    sodium chloride 0.9% 100 mL flush bag   Intravenous 1 time in Clinic/HOD Natividad Maher, CNS        sodium chloride 0.9% flush 10 mL  10 mL Intravenous PRN Natividad Maher, CNS           Review of Systems   Constitutional:  Positive for fatigue and unexpected weight change. Negative for appetite change, diaphoresis and fever.   HENT:  Negative for  "congestion, mouth sores, nosebleeds, postnasal drip, trouble swallowing and voice change.    Eyes:  Negative for pain and visual disturbance.   Respiratory:  Negative for cough, chest tightness, shortness of breath and wheezing.    Cardiovascular:  Negative for chest pain, palpitations and leg swelling.   Gastrointestinal:  Positive for constipation. Negative for abdominal distention, abdominal pain, blood in stool, diarrhea, nausea and vomiting.   Genitourinary:  Negative for difficulty urinating, flank pain, frequency, hematuria and urgency.   Musculoskeletal:  Negative for arthralgias, back pain and myalgias.   Skin:  Negative for rash and wound.   Neurological:  Negative for dizziness, facial asymmetry, weakness, light-headedness, numbness and headaches.   Psychiatric/Behavioral:  Negative for agitation, behavioral problems, confusion and sleep disturbance. The patient is not nervous/anxious.      ECOG Performance Status: 1   Objective:      Vitals:   Vitals:    01/12/23 0827   BP: 109/71   BP Location: Right arm   Patient Position: Sitting   BP Method: Small (Automatic)   Pulse: 78   Resp: 18   Temp: 97.7 °F (36.5 °C)   TempSrc: Oral   SpO2: 100%   Weight: 46.3 kg (102 lb 2.9 oz)   Height: 5' 7" (1.702 m)     Physical Exam  Vitals reviewed.   Constitutional:       General: He is not in acute distress.     Appearance: Normal appearance. He is not ill-appearing, toxic-appearing or diaphoretic.   HENT:      Head: Normocephalic and atraumatic.      Right Ear: External ear normal.      Left Ear: External ear normal.   Eyes:      General: No scleral icterus.     Extraocular Movements: Extraocular movements intact.      Conjunctiva/sclera: Conjunctivae normal.      Pupils: Pupils are equal, round, and reactive to light.   Cardiovascular:      Rate and Rhythm: Normal rate and regular rhythm.      Pulses: Normal pulses.      Heart sounds: Normal heart sounds. No murmur heard.  Pulmonary:      Effort: Pulmonary effort is " normal. No respiratory distress.      Breath sounds: Normal breath sounds. No wheezing.   Abdominal:      General: Abdomen is flat. Bowel sounds are normal. There is no distension.      Palpations: Abdomen is soft. There is no mass.      Tenderness: There is no abdominal tenderness.      Comments: Vertical midline abdominal wound well healed   Musculoskeletal:         General: No swelling or deformity. Normal range of motion.      Right lower leg: No edema.      Left lower leg: No edema.   Skin:     Coloration: Skin is not jaundiced or pale.      Findings: No bruising, erythema or rash.   Neurological:      General: No focal deficit present.      Mental Status: He is alert and oriented to person, place, and time. Mental status is at baseline.      Cranial Nerves: No cranial nerve deficit.      Sensory: No sensory deficit.      Gait: Gait normal.   Psychiatric:         Mood and Affect: Mood normal.         Behavior: Behavior normal.         Thought Content: Thought content normal.         Judgment: Judgment normal.     Laboratory Data:  Lab Visit on 01/12/2023   Component Date Value Ref Range Status    WBC 01/12/2023 7.40  3.90 - 12.70 K/uL Final    RBC 01/12/2023 4.35 (L)  4.60 - 6.20 M/uL Final    Hemoglobin 01/12/2023 11.5 (L)  14.0 - 18.0 g/dL Final    Hematocrit 01/12/2023 38.6 (L)  40.0 - 54.0 % Final    MCV 01/12/2023 89  82 - 98 fL Final    MCH 01/12/2023 26.4 (L)  27.0 - 31.0 pg Final    MCHC 01/12/2023 29.8 (L)  32.0 - 36.0 g/dL Final    RDW 01/12/2023 15.9 (H)  11.5 - 14.5 % Final    Platelets 01/12/2023 386  150 - 450 K/uL Final    MPV 01/12/2023 9.6  9.2 - 12.9 fL Final    Immature Granulocytes 01/12/2023 0.4  0.0 - 0.5 % Final    Gran # (ANC) 01/12/2023 4.7  1.8 - 7.7 K/uL Final    Immature Grans (Abs) 01/12/2023 0.03  0.00 - 0.04 K/uL Final    Comment: Mild elevation in immature granulocytes is non specific and   can be seen in a variety of conditions including stress response,   acute inflammation,  trauma and pregnancy. Correlation with other   laboratory and clinical findings is essential.      Lymph # 01/12/2023 1.3  1.0 - 4.8 K/uL Final    Mono # 01/12/2023 1.2 (H)  0.3 - 1.0 K/uL Final    Eos # 01/12/2023 0.1  0.0 - 0.5 K/uL Final    Baso # 01/12/2023 0.04  0.00 - 0.20 K/uL Final    nRBC 01/12/2023 0  0 /100 WBC Final    Gran % 01/12/2023 63.4  38.0 - 73.0 % Final    Lymph % 01/12/2023 17.8 (L)  18.0 - 48.0 % Final    Mono % 01/12/2023 16.5 (H)  4.0 - 15.0 % Final    Eosinophil % 01/12/2023 1.4  0.0 - 8.0 % Final    Basophil % 01/12/2023 0.5  0.0 - 1.9 % Final    Differential Method 01/12/2023 Automated   Final    Sodium 01/12/2023 137  136 - 145 mmol/L Final    Potassium 01/12/2023 4.2  3.5 - 5.1 mmol/L Final    Chloride 01/12/2023 102  95 - 110 mmol/L Final    CO2 01/12/2023 24  23 - 29 mmol/L Final    Glucose 01/12/2023 88  70 - 110 mg/dL Final    BUN 01/12/2023 7 (L)  8 - 23 mg/dL Final    Creatinine 01/12/2023 0.8  0.5 - 1.4 mg/dL Final    Calcium 01/12/2023 9.8  8.7 - 10.5 mg/dL Final    Total Protein 01/12/2023 8.4  6.0 - 8.4 g/dL Final    Albumin 01/12/2023 2.8 (L)  3.5 - 5.2 g/dL Final    Total Bilirubin 01/12/2023 0.5  0.1 - 1.0 mg/dL Final    Comment: For infants and newborns, interpretation of results should be based  on gestational age, weight and in agreement with clinical  observations.    Premature Infant recommended reference ranges:  Up to 24 hours.............<8.0 mg/dL  Up to 48 hours............<12.0 mg/dL  3-5 days..................<15.0 mg/dL  6-29 days.................<15.0 mg/dL      Alkaline Phosphatase 01/12/2023 124  55 - 135 U/L Final    AST 01/12/2023 21  10 - 40 U/L Final    ALT 01/12/2023 8 (L)  10 - 44 U/L Final    Anion Gap 01/12/2023 11  8 - 16 mmol/L Final    eGFR 01/12/2023 >60.0  >60 mL/min/1.73 m^2 Final    CEA 01/12/2023 70.8 (H)  0.0 - 5.0 ng/mL Final    Comment: CEA Normal Range:  Non-Smokers: 0-3.0 ng/mL  Smokers:     0-5.0 ng/mL    The testing method is a  chemiluminescent microparticle immunoassay   manufactured by [employer] and performed on the    or   Mytonomy system. Values obtained with different assay manufacturers   for   methods may be different and cannot be used interchangeably.       Assessment and Plan        1. 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 Immunodeficiency secondary to neoplasm    4. Primary hypertension    5. MARIA A (acute kidney injury)    6. Anemia associated with chemotherapy    7. Pain    8. Drug-induced constipation    9. Weight decrease    10. Severe malnutrition    11. Lower urinary tract symptoms (LUTS)      1-3.  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  Guardant 360 was negative for BRAF, VIRI, HER2 amplification.   Pretreatment CEA 57.  We had a long and sonia discussion with him about his diagnosis. Unfortunately, the disease is not curable but remains treatable and he has good performance status.   Restaging scans after 7 cycles of FOLFOX + Pema showed significant reduction in colonic mass and liver mass.   we switched to maintenance as of cycle 8 with 5FU + Pema  Restaging scans from March 2022 show stable disease.   MRI on 7/18/22 showing hepatic progression of disease in the right hepatic lobe noted on the exam. CT CAP on 8/26 shows some mild progression in both liver metastases.    Discussed case at colorectal tumor board 8/31/22 and had a diagnostic lap performed by Dr. Rodriguez on 9/7 to assess for any occult peritoneal disease. Findings from the diagnostic lap were negative. Fluid from around from around the primary mass was sent for cytology that was negative for malignancy.   Underwent primary tumor resection on 10/20/22 with Dr. Bonilla.    Meanwhile his liver mets have grown.  We discussed his case at [facility] conference with plans for short term Y-90 and then restarting chemotherapy prior to considering any surgical options to his  liver metastases.  He underwent Y-90 delivery on 12/30/22. Tolerated well.     Labs reviewed. Acceptable to proceed with cycle 40 today of FOLFOX.   Monitor CEA. Stable at this time.   Repeat imaging following this cycle.   Based on scan results we will consider re-evaluation for liver resection.    RTC in 2 weeks for next cycle.  Scheduled for imaging prior to next visit.   Reviewed appointments with patient.     4. Hypertension   -- Well controlled.   -- continue with Amlodipine .   -- Following with PCP.     5. MARIA A  -- Improved renal function. Cr normal.   -- Monitor. Encouraged continued hydration.     6. Anemia  -- Stable. Monitor.  -- Asymptomatic.   -- No signs of bleeding. Platelets normal.     7-10. Neoplasm related pain, weight loss, constipation  -- Will continue with pain management at this time as pain control is improved.  -- Following with nutrition. Kaylyn to see after our visit.   -- Continue Miralax daily for constipation.    11. Urinary hesitancy  -- Continue Flomax.  -- Reports improvement since starting medication.     Patient is in agreement with the proposed treatment plan. All questions were answered to the patient's satisfaction. Pt knows to call clinic if anything is needed before the next clinic visit.      Natividad Maher, MSN, APRN, ACCNS-  Hematology and Medical Oncology  Clinical Nurse Specialist to Dr. Schuster, Dr. Quijano & Dr. Lancaster Chart for Scheduling    Med Onc Chart Routing      Follow up with physician 2 weeks. keep appointments as scheduled in 2 weeks   Follow up with RACHEL 4 weeks. RTC in 4 weeks with labs (CBC,CMP,CEA) to see Claudio for chemo   Infusion scheduling note pump d/c on day 3 of each cycle   Injection scheduling note    Labs CBC, CMP and CEA   Lab interval: every 2 weeks     Imaging    Pharmacy appointment    Other referrals        Treatment Plan Information   OP COLORECTAL FOLFOX + BEVACIZUMAB Q2W   Torres Pantoja MD   Upcoming Treatment Dates - OP  COLORECTAL FOLFOX + BEVACIZUMAB Q2W    1/27/2023       Chemotherapy       oxaliplatin (ELOXATIN) in dextrose 5 % chemo infusion       fluorouraciL in sodium chloride 0.9% 100 mL chemo infusion       Antiemetics       palonosetron 0.25mg/dexAMETHasone 12mg in NS IVPB    Therapy Plan Information  IV Fluids  sodium chloride 0.9% bolus 1,000 mL  1,000 mL, Intravenous, Every visit  Flushes  sodium chloride 0.9% flush 10 mL  10 mL, Intravenous, PRN  heparin, porcine (PF) 100 unit/mL injection flush 500 Units  500 Units, Intravenous, PRN

## 2023-01-12 NOTE — PLAN OF CARE
1320  Patient completed Oxaliplatin infusion, tolerated well. CADD connected to port to infuse @ 2.2 cc/hr for 46 hours for a total of 100 cc 5FU.  CADD infusing prior to patient ambulating off floor accompanied by daughter.  RTC 1/14/23 @ 1100, patient aware.

## 2023-01-12 NOTE — PLAN OF CARE
0915  Patient seated in chair, vss, assessment done.  Port accessed without issue, flushed, blood return noted, Biopatch applied.  Started on D5W @ 15 cc/hr KVO while waiting for Oxaliplatin, 5FU from pharmacy, [facility] for safety

## 2023-01-14 ENCOUNTER — INFUSION (OUTPATIENT)
Dept: INFUSION THERAPY | Facility: HOSPITAL | Age: 73
End: 2023-01-14
Payer: MEDICARE

## 2023-01-14 VITALS
OXYGEN SATURATION: 100 % | HEART RATE: 69 BPM | RESPIRATION RATE: 18 BRPM | TEMPERATURE: 98 F | SYSTOLIC BLOOD PRESSURE: 114 MMHG | DIASTOLIC BLOOD PRESSURE: 68 MMHG

## 2023-01-14 DIAGNOSIS — C78.7 METASTATIC COLON CANCER TO LIVER: Primary | ICD-10-CM

## 2023-01-14 DIAGNOSIS — C18.9 METASTATIC COLON CANCER TO LIVER: Primary | ICD-10-CM

## 2023-01-14 PROCEDURE — 25000003 PHARM REV CODE 250: Performed by: REGISTERED NURSE

## 2023-01-14 PROCEDURE — 63600175 PHARM REV CODE 636 W HCPCS: Performed by: REGISTERED NURSE

## 2023-01-14 PROCEDURE — A4216 STERILE WATER/SALINE, 10 ML: HCPCS | Performed by: REGISTERED NURSE

## 2023-01-14 RX ORDER — HEPARIN 100 UNIT/ML
500 SYRINGE INTRAVENOUS
Status: DISCONTINUED | OUTPATIENT
Start: 2023-01-14 | End: 2023-01-14 | Stop reason: HOSPADM

## 2023-01-14 RX ORDER — DIPHENHYDRAMINE HYDROCHLORIDE 50 MG/ML
25 INJECTION INTRAMUSCULAR; INTRAVENOUS
Status: DISCONTINUED | OUTPATIENT
Start: 2023-01-14 | End: 2023-01-14 | Stop reason: HOSPADM

## 2023-01-14 RX ORDER — SODIUM CHLORIDE 0.9 % (FLUSH) 0.9 %
10 SYRINGE (ML) INJECTION
Status: DISCONTINUED | OUTPATIENT
Start: 2023-01-14 | End: 2023-01-14 | Stop reason: HOSPADM

## 2023-01-14 RX ADMIN — HEPARIN 500 UNITS: 100 SYRINGE at 11:01

## 2023-01-14 RX ADMIN — Medication 10 ML: at 11:01

## 2023-01-14 NOTE — PLAN OF CARE
Patient tolerated pump dc with no complications. Port flushed and deaccessed. Patient ambulated away from clinic in [facility].

## 2023-01-19 DIAGNOSIS — C18.9 COLON CANCER METASTASIZED TO LIVER: ICD-10-CM

## 2023-01-19 DIAGNOSIS — C78.7 METASTATIC COLON CANCER TO LIVER: Primary | ICD-10-CM

## 2023-01-19 DIAGNOSIS — C78.7 COLON CANCER METASTASIZED TO LIVER: ICD-10-CM

## 2023-01-19 DIAGNOSIS — C18.9 METASTATIC COLON CANCER TO LIVER: Primary | ICD-10-CM

## 2023-01-19 DIAGNOSIS — C78.7 COLON CANCER METASTASIZED TO LIVER: Primary | ICD-10-CM

## 2023-01-19 DIAGNOSIS — C18.9 COLON CANCER METASTASIZED TO LIVER: Primary | ICD-10-CM

## 2023-01-23 ENCOUNTER — HOSPITAL ENCOUNTER (OUTPATIENT)
Dept: RADIOLOGY | Facility: HOSPITAL | Age: 73
Discharge: HOME | End: 2023-01-23
Attending: STUDENT IN AN ORGANIZED HEALTH CARE EDUCATION/TRAINING PROGRAM
Payer: MEDICARE

## 2023-01-23 DIAGNOSIS — C18.9 METASTATIC COLON CANCER TO LIVER: ICD-10-CM

## 2023-01-23 DIAGNOSIS — C78.7 METASTATIC COLON CANCER TO LIVER: ICD-10-CM

## 2023-01-23 PROCEDURE — 71260 CT THORAX DX C+: CPT | Mod: TC

## 2023-01-23 PROCEDURE — 71260 CT CHEST ABDOMEN PELVIS WITH CONTRAST (XPD): ICD-10-PCS | Mod: 26,,, | Performed by: INTERNAL MEDICINE

## 2023-01-23 PROCEDURE — 25500020 PHARM REV CODE 255: Performed by: STUDENT IN AN ORGANIZED HEALTH CARE EDUCATION/TRAINING PROGRAM

## 2023-01-23 PROCEDURE — 74177 CT ABD & PELVIS W/CONTRAST: CPT | Mod: TC

## 2023-01-23 PROCEDURE — 71260 CT THORAX DX C+: CPT | Mod: 26,,, | Performed by: INTERNAL MEDICINE

## 2023-01-23 PROCEDURE — 74177 CT CHEST ABDOMEN PELVIS WITH CONTRAST (XPD): ICD-10-PCS | Mod: 26,,, | Performed by: INTERNAL MEDICINE

## 2023-01-23 PROCEDURE — 74177 CT ABD & PELVIS W/CONTRAST: CPT | Mod: 26,,, | Performed by: INTERNAL MEDICINE

## 2023-01-23 RX ADMIN — IOHEXOL 75 ML: 350 INJECTION, SOLUTION INTRAVENOUS at 02:01

## 2023-01-26 ENCOUNTER — TELEPHONE (OUTPATIENT)
Dept: INTERVENTIONAL RADIOLOGY/VASCULAR | Facility: HOSPITAL | Age: 73
End: 2023-01-26
Payer: MEDICARE

## 2023-01-26 ENCOUNTER — OFFICE VISIT (OUTPATIENT)
Dept: HEMATOLOGY/ONCOLOGY | Facility: CLINIC | Age: 73
End: 2023-01-26
Payer: MEDICARE

## 2023-01-26 ENCOUNTER — INFUSION (OUTPATIENT)
Dept: INFUSION THERAPY | Facility: HOSPITAL | Age: 73
End: 2023-01-26
Payer: MEDICARE

## 2023-01-26 VITALS
RESPIRATION RATE: 18 BRPM | BODY MASS INDEX: 17.31 KG/M2 | HEART RATE: 87 BPM | WEIGHT: 110.31 LBS | OXYGEN SATURATION: 100 % | DIASTOLIC BLOOD PRESSURE: 72 MMHG | TEMPERATURE: 98 F | SYSTOLIC BLOOD PRESSURE: 111 MMHG | HEIGHT: 67 IN

## 2023-01-26 VITALS
RESPIRATION RATE: 18 BRPM | SYSTOLIC BLOOD PRESSURE: 121 MMHG | DIASTOLIC BLOOD PRESSURE: 68 MMHG | OXYGEN SATURATION: 100 % | TEMPERATURE: 98 F | HEART RATE: 81 BPM

## 2023-01-26 DIAGNOSIS — N17.9 AKI (ACUTE KIDNEY INJURY): ICD-10-CM

## 2023-01-26 DIAGNOSIS — D49.9 IMMUNODEFICIENCY SECONDARY TO NEOPLASM: ICD-10-CM

## 2023-01-26 DIAGNOSIS — C18.9 METASTATIC COLON CANCER TO LIVER: Primary | ICD-10-CM

## 2023-01-26 DIAGNOSIS — I10 PRIMARY HYPERTENSION: ICD-10-CM

## 2023-01-26 DIAGNOSIS — R63.4 WEIGHT DECREASE: ICD-10-CM

## 2023-01-26 DIAGNOSIS — R52 PAIN: ICD-10-CM

## 2023-01-26 DIAGNOSIS — T45.1X5A ANEMIA ASSOCIATED WITH CHEMOTHERAPY: ICD-10-CM

## 2023-01-26 DIAGNOSIS — C78.7 METASTATIC COLON CANCER TO LIVER: Primary | ICD-10-CM

## 2023-01-26 DIAGNOSIS — T45.1X5A IMMUNODEFICIENCY DUE TO CHEMOTHERAPY: ICD-10-CM

## 2023-01-26 DIAGNOSIS — K59.03 DRUG-INDUCED CONSTIPATION: ICD-10-CM

## 2023-01-26 DIAGNOSIS — D84.81 IMMUNODEFICIENCY SECONDARY TO NEOPLASM: ICD-10-CM

## 2023-01-26 DIAGNOSIS — R39.9 LOWER URINARY TRACT SYMPTOMS (LUTS): ICD-10-CM

## 2023-01-26 DIAGNOSIS — E43 SEVERE MALNUTRITION: ICD-10-CM

## 2023-01-26 DIAGNOSIS — Z79.899 IMMUNODEFICIENCY DUE TO CHEMOTHERAPY: ICD-10-CM

## 2023-01-26 DIAGNOSIS — D84.821 IMMUNODEFICIENCY DUE TO CHEMOTHERAPY: ICD-10-CM

## 2023-01-26 DIAGNOSIS — D64.81 ANEMIA ASSOCIATED WITH CHEMOTHERAPY: ICD-10-CM

## 2023-01-26 PROCEDURE — 96361 HYDRATE IV INFUSION ADD-ON: CPT

## 2023-01-26 PROCEDURE — 96375 TX/PRO/DX INJ NEW DRUG ADDON: CPT

## 2023-01-26 PROCEDURE — 25000003 PHARM REV CODE 250: Performed by: INTERNAL MEDICINE

## 2023-01-26 PROCEDURE — 99999 PR PBB SHADOW E&M-EST. PATIENT-LVL III: CPT | Mod: PBBFAC,,, | Performed by: INTERNAL MEDICINE

## 2023-01-26 PROCEDURE — 99999 PR PBB SHADOW E&M-EST. PATIENT-LVL III: ICD-10-PCS | Mod: PBBFAC,,, | Performed by: INTERNAL MEDICINE

## 2023-01-26 PROCEDURE — 99215 OFFICE O/P EST HI 40 MIN: CPT | Mod: S$GLB,,, | Performed by: INTERNAL MEDICINE

## 2023-01-26 PROCEDURE — 96367 TX/PROPH/DG ADDL SEQ IV INF: CPT

## 2023-01-26 PROCEDURE — 99215 PR OFFICE/OUTPT VISIT, EST, LEVL V, 40-54 MIN: ICD-10-PCS | Mod: S$GLB,,, | Performed by: INTERNAL MEDICINE

## 2023-01-26 PROCEDURE — 63600175 PHARM REV CODE 636 W HCPCS: Performed by: INTERNAL MEDICINE

## 2023-01-26 PROCEDURE — 96365 THER/PROPH/DIAG IV INF INIT: CPT

## 2023-01-26 PROCEDURE — 96416 CHEMO PROLONG INFUSE W/PUMP: CPT

## 2023-01-26 PROCEDURE — 99213 OFFICE O/P EST LOW 20 MIN: CPT | Mod: PBBFAC,25 | Performed by: INTERNAL MEDICINE

## 2023-01-26 RX ORDER — SODIUM CHLORIDE 0.9 % (FLUSH) 0.9 %
10 SYRINGE (ML) INJECTION
Status: CANCELLED | OUTPATIENT
Start: 2023-01-27

## 2023-01-26 RX ORDER — HEPARIN 100 UNIT/ML
500 SYRINGE INTRAVENOUS
Status: CANCELLED | OUTPATIENT
Start: 2023-01-29

## 2023-01-26 RX ORDER — HEPARIN 100 UNIT/ML
500 SYRINGE INTRAVENOUS
Status: DISCONTINUED | OUTPATIENT
Start: 2023-01-26 | End: 2023-01-26 | Stop reason: HOSPADM

## 2023-01-26 RX ORDER — SODIUM CHLORIDE 0.9 % (FLUSH) 0.9 %
10 SYRINGE (ML) INJECTION
Status: DISCONTINUED | OUTPATIENT
Start: 2023-01-26 | End: 2023-01-26 | Stop reason: HOSPADM

## 2023-01-26 RX ORDER — DIPHENHYDRAMINE HYDROCHLORIDE 50 MG/ML
50 INJECTION INTRAMUSCULAR; INTRAVENOUS ONCE AS NEEDED
Status: CANCELLED | OUTPATIENT
Start: 2023-01-27

## 2023-01-26 RX ORDER — DIPHENHYDRAMINE HYDROCHLORIDE 50 MG/ML
50 INJECTION INTRAMUSCULAR; INTRAVENOUS ONCE AS NEEDED
Status: DISCONTINUED | OUTPATIENT
Start: 2023-01-26 | End: 2023-01-26 | Stop reason: HOSPADM

## 2023-01-26 RX ORDER — SODIUM CHLORIDE 0.9 % (FLUSH) 0.9 %
10 SYRINGE (ML) INJECTION
Status: CANCELLED | OUTPATIENT
Start: 2023-01-26

## 2023-01-26 RX ORDER — DIPHENHYDRAMINE HYDROCHLORIDE 50 MG/ML
25 INJECTION INTRAMUSCULAR; INTRAVENOUS
Status: COMPLETED | OUTPATIENT
Start: 2023-01-26 | End: 2023-01-26

## 2023-01-26 RX ORDER — EPINEPHRINE 0.3 MG/.3ML
0.3 INJECTION SUBCUTANEOUS ONCE AS NEEDED
Status: CANCELLED | OUTPATIENT
Start: 2023-01-27

## 2023-01-26 RX ORDER — DIPHENHYDRAMINE HYDROCHLORIDE 50 MG/ML
25 INJECTION INTRAMUSCULAR; INTRAVENOUS
Status: CANCELLED
Start: 2023-01-29

## 2023-01-26 RX ORDER — HEPARIN 100 UNIT/ML
500 SYRINGE INTRAVENOUS
Status: CANCELLED | OUTPATIENT
Start: 2023-01-26

## 2023-01-26 RX ORDER — EPINEPHRINE 0.3 MG/.3ML
0.3 INJECTION SUBCUTANEOUS ONCE AS NEEDED
Status: DISCONTINUED | OUTPATIENT
Start: 2023-01-26 | End: 2023-01-26 | Stop reason: HOSPADM

## 2023-01-26 RX ORDER — DIPHENHYDRAMINE HYDROCHLORIDE 50 MG/ML
25 INJECTION INTRAMUSCULAR; INTRAVENOUS
Status: CANCELLED
Start: 2023-01-27

## 2023-01-26 RX ORDER — HEPARIN 100 UNIT/ML
500 SYRINGE INTRAVENOUS
Status: CANCELLED | OUTPATIENT
Start: 2023-01-27

## 2023-01-26 RX ORDER — SODIUM CHLORIDE 0.9 % (FLUSH) 0.9 %
10 SYRINGE (ML) INJECTION
Status: CANCELLED | OUTPATIENT
Start: 2023-01-29

## 2023-01-26 RX ADMIN — DEXTROSE: 50 INJECTION, SOLUTION INTRAVENOUS at 10:01

## 2023-01-26 RX ADMIN — DIPHENHYDRAMINE HYDROCHLORIDE 25 MG: 50 INJECTION, SOLUTION INTRAMUSCULAR; INTRAVENOUS at 11:01

## 2023-01-26 RX ADMIN — DEXAMETHASONE SODIUM PHOSPHATE 0.25 MG: 4 INJECTION, SOLUTION INTRA-ARTICULAR; INTRALESIONAL; INTRAMUSCULAR; INTRAVENOUS; SOFT TISSUE at 10:01

## 2023-01-26 RX ADMIN — SODIUM CHLORIDE 1000 ML: 9 INJECTION, SOLUTION INTRAVENOUS at 11:01

## 2023-01-26 RX ADMIN — FLUOROURACIL 3695 MG: 50 INJECTION, SOLUTION INTRAVENOUS at 01:01

## 2023-01-26 NOTE — PROGRESS NOTES
"[facility]  [facility]  Hematology/Medical Oncology Clinic     PATIENT: Javier Downs  MRN: [id number]  DATE: 1/26/2023    Diagnosis: Transverse colon metastatic cancer to the liver     Oncological history:   04/20/2021: metastatic colon cancer to the liver, moderately differentiated, pMMR  05/06/2021: C1 mFOLFOX + Pema  05/20/2021: C2 mFOLFOX + Pema  06/03/2021: C3 mFOLFOX + Pema   06/17/2021: C4 mFOLFOX + Pema  07/01/2021: C5 mFOLFOX + Pema  07/15/2021: C6 mFOLFOX + Pema  Restaging CT CAP: significant improvement of lesions   07/29/2021: C7 mFOLFOX + Pema   08/12/2021: Switched to maintenance  5FU+Pema (C8)  06/23/2022: C30 maintenance 5FU+Pema  10/20/2022: R hemicolectomy with Dr. Bonilla  12/30/2022: Y-90 to R liver    Oncological History copied from medical chart:   Mr. Downs is a 72 y.o. male   70 years old pleasant AA gentleman, with history of hypertension, presenting with two weeks duration of abdominal cramps, diarrhea, nausea and vomiting. His symptoms started gradually with intermittent generalize crampy abdominal pain, worse with food. That was associated with nausea, reflux and occaisonal vomiting. He tried pepto-bismol and imodium with no relief, and hence he presented to ER.   He lost significant amount of weight, but cannot quantify the amount or the time period. He has also been feeling weaker than usual.   He hasn't seen a healthcare provider in over than 10 years. He has never had a colonoscopy.   In the ER. His labs were significant of microcytic anemia of 9.7 g/dl, MCV 77, and Platelets counts of 495. CT of the abdomen and pelvis showed " Large mass in the transverse colon highly suspicious for adenocarcinoma resulting in at least high-grade partial obstruction with dilation of proximal colon and small bowel. Soft tissue nodules in the adjacent mesentery are suspicious for pily metastases and/or mesenteric implants.  Numerous hepatic masses measuring up to 11.2 cm most " "likely related to metastatic disease.  There also several small subcapsular lesions which could reflect additional metastatic disease"   CT chest was negative to metastatic disease.   He underwent colonoscopy and stent placement. 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 HER 2 negative, VIRI/SHERI NGS was cancelled due to insufficient quantity   Guardant 360 was sent, negative for KRAS, NRAS, BRAF     He started palliative chemotherapy with FOLFOX+Pema     Interval History:   He presents today with his daughter for follow-up prior to cycle 41 of FOLFOX. Appetite has improved, gained weight.  He notes some R rib/flank pain that is intermittent and sharp - worsened with inspiration.  This has been present for several days, started before his recent CT.  No other new complaints.    ECOG status is 1.       Past Medical History:   Past Medical History:   Diagnosis Date    MARIA A (acute kidney injury) 8/18/2022    Hypertension     Metastatic colon cancer to liver 4/22/2021       Past Surgical HIstory:   Past Surgical History:   Procedure Laterality Date    COLONOSCOPY N/A 4/20/2021    Procedure: COLONOSCOPY with possible stent;  Surgeon: EDILMA Bonilla MD;  Location: [facility] (2ND FLR);  Service: Colon and Rectal;  Laterality: N/A;    DIAGNOSTIC LAPAROSCOPY N/A 9/7/2022    Procedure: LAPAROSCOPY, DIAGNOSTIC;  Surgeon: Adrian Rodriguez MD;  [facility];  Service: General;  Laterality: N/A;    INSERTION OF TUNNELED CENTRAL VENOUS CATHETER (CVC) WITH SUBCUTANEOUS PORT N/A 5/3/2021    Procedure: CEKOKMFOV-PUOX-O-CATH;  Surgeon: Francisco Layton MD;  [facility];  Service: Vascular;  Laterality: N/A;    OMENTECTOMY N/A 10/20/2022    Procedure: OMENTECTOMY;  Surgeon: EDILMA Bonilla MD;  [facility];  Service: Colon and Rectal;  " Laterality: N/A;    RIGHT HEMICOLECTOMY N/A 10/20/2022    Procedure: HEMICOLECTOMY, RIGHT, extended;  Surgeon: EDILMA Bonilla MD;  Location: [facility] OR [address] [place] [address];  Service: Colon and Rectal;  Laterality: N/A;  Extended right hemicolectomy CONSENT IN AM       Family History: No family history on file.    Social History:  reports that he has never smoked. He has never used smokeless tobacco. He reports current alcohol use.    Allergies:  Review of patient's allergies indicates:  No Known Allergies    Medications:  Current Outpatient Medications   Medication Sig Dispense Refill    acetaminophen (TYLENOL) 500 MG tablet Take 1 tablet (500 mg total) by mouth every 6 (six) hours as needed for Pain (alternate with ibuprofen).  0    amLODIPine (NORVASC) 10 MG tablet Take 1 tablet (10 mg total) by mouth once daily. 90 tablet 3    ibuprofen (ADVIL,MOTRIN) 400 MG tablet Take 1 tablet (400 mg total) by mouth every 6 (six) hours as needed for Other (pain). Alternate with tylenol      ondansetron (ZOFRAN) 4 MG tablet Take 1 tablet (4 mg total) by mouth every 8 (eight) hours as needed for Nausea. 30 tablet 3    oxyCODONE (ROXICODONE) 5 MG immediate release tablet Take 1 tablet (5 mg total) by mouth every 6 (six) hours as needed for Pain. 20 tablet 0    tamsulosin (FLOMAX) 0.4 mg Cap Take 1 capsule (0.4 mg total) by mouth once daily. 30 capsule 5     No current facility-administered medications for this visit.     Facility-Administered Medications Ordered in Other Visits   Medication Dose Route Frequency Provider Last Rate Last Admin    alteplase injection 2 mg  2 mg Intra-Catheter PRN Bunny Schuster MD        dextrose 5 % (D5W) 250 mL flush bag   Intravenous 1 time in Clinic/HOD Bunny Schuster MD        diphenhydrAMINE injection 25 mg  25 mg Intravenous 1 time in Clinic/HOD Bunny Schuster MD        diphenhydrAMINE injection 50 mg  50 mg Intravenous Once PRN Bunny Schuster MD        EPINEPHrine (EPIPEN) 0.3  mg/0.3 mL pen injection 0.3 mg  0.3 mg Intramuscular Once PRN Bunny Schuster MD        fluorouraciL 2,400 mg/m2 = 3,695 mg in sodium chloride 0.9% 100 mL chemo infusion  2,400 mg/m2 Intravenous over 46 hr Bunny Schuster MD        heparin, porcine (PF) 100 unit/mL injection flush 500 Units  500 Units Intravenous PRN Bunny Schuster MD        hydrocortisone sodium succinate injection 100 mg  100 mg Intravenous Once PRN Bunny Schuster MD        palonosetron 0.25mg/dexAMETHasone 12mg in NS IVPB   Intravenous 1 time in Clinic/HOD Bunny Schuster MD        sodium chloride 0.9% 100 mL flush bag   Intravenous 1 time in Clinic/HOD Bunny Schuster MD        sodium chloride 0.9% flush 10 mL  10 mL Intravenous PRN Bunny Schuster MD           Review of Systems   Constitutional:  Positive for fatigue. Negative for appetite change, diaphoresis, fever and unexpected weight change.   HENT:  Negative for congestion, mouth sores, nosebleeds, postnasal drip, trouble swallowing and voice change.    Eyes:  Negative for pain and visual disturbance.   Respiratory:  Negative for cough, chest tightness, shortness of breath and wheezing.    Cardiovascular:  Negative for chest pain, palpitations and leg swelling.   Gastrointestinal:  Negative for abdominal distention, abdominal pain, blood in stool, constipation, diarrhea, nausea and vomiting.   Genitourinary:  Positive for flank pain. Negative for difficulty urinating, frequency, hematuria and urgency.   Musculoskeletal:  Negative for arthralgias, back pain and myalgias.   Skin:  Negative for rash and wound.   Neurological:  Negative for dizziness, facial asymmetry, weakness, light-headedness, numbness and headaches.   Psychiatric/Behavioral:  Negative for agitation, behavioral problems, confusion and sleep disturbance. The patient is not nervous/anxious.      ECOG Performance Status: 1   Objective:      Vitals:   Vitals:    01/26/23 0944   BP: 111/72  "  BP Location: Left arm   Patient Position: Sitting   BP Method: Small (Automatic)   Pulse: 87   Resp: 18   Temp: 97.8 °F (36.6 °C)   TempSrc: Oral   SpO2: 100%   Weight: 50 kg (110 lb 5.4 oz)   Height: 5' 7" (1.702 m)     Physical Exam  Vitals reviewed.   Constitutional:       General: He is not in acute distress.     Appearance: Normal appearance. He is not ill-appearing, toxic-appearing or diaphoretic.   HENT:      Head: Normocephalic and atraumatic.      Right Ear: External ear normal.      Left Ear: External ear normal.   Eyes:      General: No scleral icterus.     Extraocular Movements: Extraocular movements intact.      Conjunctiva/sclera: Conjunctivae normal.      Pupils: Pupils are equal, round, and reactive to light.   Cardiovascular:      Rate and Rhythm: Normal rate and regular rhythm.      Pulses: Normal pulses.      Heart sounds: Normal heart sounds. No murmur heard.  Pulmonary:      Effort: Pulmonary effort is normal. No respiratory distress.      Breath sounds: Normal breath sounds. No wheezing.   Abdominal:      General: Abdomen is flat. Bowel sounds are normal. There is no distension.      Palpations: Abdomen is soft. There is no mass.      Tenderness: There is no abdominal tenderness.      Comments: Vertical midline abdominal wound well healed   Musculoskeletal:         General: No swelling or deformity. Normal range of motion.      Right lower leg: No edema.      Left lower leg: No edema.   Skin:     Coloration: Skin is not jaundiced or pale.      Findings: No bruising, erythema or rash.   Neurological:      General: No focal deficit present.      Mental Status: He is alert and oriented to person, place, and time. Mental status is at baseline.      Cranial Nerves: No cranial nerve deficit.      Sensory: No sensory deficit.      Gait: Gait normal.   Psychiatric:         Mood and Affect: Mood normal.         Behavior: Behavior normal.         Thought Content: Thought content normal.         " Judgment: Judgment normal.     Laboratory Data:  Lab Visit on 01/26/2023   Component Date Value Ref Range Status    WBC 01/26/2023 6.47  3.90 - 12.70 K/uL Final    RBC 01/26/2023 3.83 (L)  4.60 - 6.20 M/uL Final    Hemoglobin 01/26/2023 10.1 (L)  14.0 - 18.0 g/dL Final    Hematocrit 01/26/2023 33.5 (L)  40.0 - 54.0 % Final    MCV 01/26/2023 88  82 - 98 fL Final    MCH 01/26/2023 26.4 (L)  27.0 - 31.0 pg Final    MCHC 01/26/2023 30.1 (L)  32.0 - 36.0 g/dL Final    RDW 01/26/2023 15.8 (H)  11.5 - 14.5 % Final    Platelets 01/26/2023 359  150 - 450 K/uL Final    MPV 01/26/2023 9.0 (L)  9.2 - 12.9 fL Final    Immature Granulocytes 01/26/2023 0.3  0.0 - 0.5 % Final    Gran # (ANC) 01/26/2023 4.4  1.8 - 7.7 K/uL Final    Immature Grans (Abs) 01/26/2023 0.02  0.00 - 0.04 K/uL Final    Comment: Mild elevation in immature granulocytes is non specific and   can be seen in a variety of conditions including stress response,   acute inflammation, trauma and pregnancy. Correlation with other   laboratory and clinical findings is essential.      Lymph # 01/26/2023 0.9 (L)  1.0 - 4.8 K/uL Final    Mono # 01/26/2023 1.0  0.3 - 1.0 K/uL Final    Eos # 01/26/2023 0.1  0.0 - 0.5 K/uL Final    Baso # 01/26/2023 0.03  0.00 - 0.20 K/uL Final    nRBC 01/26/2023 0  0 /100 WBC Final    Gran % 01/26/2023 68.2  38.0 - 73.0 % Final    Lymph % 01/26/2023 13.8 (L)  18.0 - 48.0 % Final    Mono % 01/26/2023 15.8 (H)  4.0 - 15.0 % Final    Eosinophil % 01/26/2023 1.4  0.0 - 8.0 % Final    Basophil % 01/26/2023 0.5  0.0 - 1.9 % Final    Differential Method 01/26/2023 Automated   Final    Sodium 01/26/2023 138  136 - 145 mmol/L Final    Potassium 01/26/2023 3.9  3.5 - 5.1 mmol/L Final    Chloride 01/26/2023 104  95 - 110 mmol/L Final    CO2 01/26/2023 24  23 - 29 mmol/L Final    Glucose 01/26/2023 95  70 - 110 mg/dL Final    BUN 01/26/2023 10  8 - 23 mg/dL Final    Creatinine 01/26/2023 0.7  0.5 - 1.4 mg/dL Final    Calcium 01/26/2023 9.3  8.7 - 10.5  mg/dL Final    Total Protein 01/26/2023 8.3  6.0 - 8.4 g/dL Final    Albumin 01/26/2023 2.3 (L)  3.5 - 5.2 g/dL Final    Total Bilirubin 01/26/2023 0.4  0.1 - 1.0 mg/dL Final    Comment: For infants and newborns, interpretation of results should be based  on gestational age, weight and in agreement with clinical  observations.    Premature Infant recommended reference ranges:  Up to 24 hours.............<8.0 mg/dL  Up to 48 hours............<12.0 mg/dL  3-5 days..................<15.0 mg/dL  6-29 days.................<15.0 mg/dL      Alkaline Phosphatase 01/26/2023 116  55 - 135 U/L Final    AST 01/26/2023 20  10 - 40 U/L Final    ALT 01/26/2023 6 (L)  10 - 44 U/L Final    Anion Gap 01/26/2023 10  8 - 16 mmol/L Final    eGFR 01/26/2023 >60.0  >60 mL/min/1.73 m^2 Final    CEA 01/26/2023 28.0 (H)  0.0 - 5.0 ng/mL Final    Comment: CEA Normal Range:  Non-Smokers: 0-3.0 ng/mL  Smokers:     0-5.0 ng/mL    The testing method is a chemiluminescent microparticle immunoassay   manufactured by [employer] and performed on the [employer]   or   [employer] system. Values obtained with different assay manufacturers   for   methods may be different and cannot be used interchangeably.       Assessment and Plan        1. 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 Immunodeficiency secondary to neoplasm    4. Primary hypertension    5. MARIA A (acute kidney injury)    6. Anemia associated with chemotherapy    7. Pain    8. Drug-induced constipation    9. Weight decrease    10. Severe malnutrition    11. Lower urinary tract symptoms (LUTS)        1-3.  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  Guardant 360 was negative for BRAF, VIRI, HER2 amplification.   Pretreatment CEA 57.  We had a long and sonia discussion with him about his diagnosis. Unfortunately, the disease is not curable but remains treatable and he has good performance status.   Restaging scans  after 7 cycles of FOLFOX + Pema showed significant reduction in colonic mass and liver mass.   we switched to maintenance as of cycle 8 with 5FU + Pema  Restaging scans from March 2022 show stable disease.   MRI on 7/18/22 showing hepatic progression of disease in the right hepatic lobe noted on the exam. CT CAP on 8/26 shows some mild progression in both liver metastases.    Discussed case at colorectal tumor board 8/31/22 and had a diagnostic lap performed by Dr. Rodriguez on 9/7 to assess for any occult peritoneal disease. Findings from the diagnostic lap were negative. Fluid from around from around the primary mass was sent for cytology that was negative for malignancy.   Underwent primary tumor resection on 10/20/22 with Dr. Bonilla.    Meanwhile his liver mets had grown.  We discussed his case at [facility] conference with plans for short term Y-90 and then restarting chemotherapy prior to considering any surgical options to his liver metastases.  He underwent Y-90 delivery on 12/30/22. Tolerated well.   CT CAP on 1/23/23 reviewed at [facility] conference with good response to Y-90, no new lesions.  Proceed with second Y-90 1/27/23 and then consider R hepatectomy pending follow-up imaging after Y-90.    Labs reviewed. Acceptable to proceed with cycle 41 today of FOLFOX.  Because of some worsening neuropathy in feet, will omit oxaliplatin and plan for maintenance 5-FU again at thist dayna.  Monitor CEA.     RTC in 2 weeks for next cycle.      4. Hypertension   -- Well controlled.   -- continue with Amlodipine .   -- Following with PCP.     5. MARIA A  -- Improved renal function. Cr normal.   -- Monitor. Encouraged continued hydration.     6. Anemia  -- Stable. Monitor.  -- Asymptomatic.   -- No signs of bleeding. Platelets normal.     7-10. Neoplasm related pain, weight loss, constipation  -- Will continue with pain management at this time as pain control is improved.  -- Following with nutrition.   -- Continue Miralax daily for  constipation.  -- No e/o PE on CT. R flank pain likely related to post Y-90 effect on hepatic mets.    11. Urinary hesitancy  -- Continue Flomax.  -- Reports improvement since starting medication.     Patient is in agreement with the proposed treatment plan. All questions were answered to the patient's satisfaction. Pt knows to call clinic if anything is needed before the next clinic visit.    Bunny Schuster MD  Hematology/Oncology  [facility]         Route Chart for Scheduling    Med Onc Chart Routing      Follow up with physician 4 weeks. for 5-FU   Follow up with RACHEL 2 weeks. for 5-FU   Infusion scheduling note    Injection scheduling note    Labs CBC, CMP and CEA   Lab interval:     Imaging    Pharmacy appointment    Other referrals        Treatment Plan Information   OP COLORECTAL FOLFOX + BEVACIZUMAB Q2W   Torres Pantoja MD   Upcoming Treatment Dates - OP COLORECTAL FOLFOX + BEVACIZUMAB Q2W    2/10/2023       Chemotherapy       fluorouraciL in sodium chloride 0.9% 100 mL chemo infusion       Antiemetics       palonosetron (ALOXI) 0.25 mg with Dexamethasone (DECADRON) 12 mg in NS 50 mL IVPB  2/24/2023       Chemotherapy       fluorouraciL in sodium chloride 0.9% 100 mL chemo infusion       Antiemetics       palonosetron (ALOXI) 0.25 mg with Dexamethasone (DECADRON) 12 mg in NS 50 mL IVPB  3/10/2023       Chemotherapy       fluorouraciL in sodium chloride 0.9% 100 mL chemo infusion       Antiemetics       palonosetron (ALOXI) 0.25 mg with Dexamethasone (DECADRON) 12 mg in NS 50 mL IVPB    Therapy Plan Information  IV Fluids  sodium chloride 0.9% bolus 1,000 mL  1,000 mL, Intravenous, Every visit  Flushes  sodium chloride 0.9% flush 10 mL  10 mL, Intravenous, PRN  heparin, porcine (PF) 100 unit/mL injection flush 500 Units  500 Units, Intravenous, PRN

## 2023-01-26 NOTE — NURSING
Pre-procedure call complete.  Spoke to patients daughter.  Pt instructed not to eat or drink anything after midnight the night before procedure.  Aware will need someone to provide transport home and monitor pt 8 hours post procedure.  No driving for 3 days after procedure.   Medications reviewed.  Arrival time and location given.  Expected length of stay reviewed.  Covid screening completed.  Verbalized understanding.

## 2023-01-26 NOTE — PLAN OF CARE
Pt ambulatory to clinic with family for C 41 of Folfox. Pt denies any sig complaints. Port accessed without difficulty. Upon releasing orders noted that Oxaliplatin was removed and pt only to receive IVF's and 5FU. Pt aware of change. 5FU infusing via CADD pump with RUN noted on screen and Volume decreasing. RTC on Saturday at 1100 for pump d/c. Pt aware. In NAD.

## 2023-01-27 ENCOUNTER — HOSPITAL ENCOUNTER (OUTPATIENT)
Dept: RADIOLOGY | Facility: HOSPITAL | Age: 73
Discharge: HOME | End: 2023-01-27
Attending: FAMILY MEDICINE
Payer: MEDICARE

## 2023-01-27 ENCOUNTER — HOSPITAL ENCOUNTER (OUTPATIENT)
Dept: INTERVENTIONAL RADIOLOGY/VASCULAR | Facility: HOSPITAL | Age: 73
Discharge: HOME | End: 2023-01-27
Payer: MEDICARE

## 2023-01-27 VITALS
RESPIRATION RATE: 17 BRPM | BODY MASS INDEX: 18.83 KG/M2 | OXYGEN SATURATION: 99 % | HEART RATE: 84 BPM | SYSTOLIC BLOOD PRESSURE: 158 MMHG | WEIGHT: 120 LBS | TEMPERATURE: 99 F | DIASTOLIC BLOOD PRESSURE: 83 MMHG | HEIGHT: 67 IN

## 2023-01-27 DIAGNOSIS — C78.7 COLON CANCER METASTASIZED TO LIVER: ICD-10-CM

## 2023-01-27 DIAGNOSIS — C18.9 COLON CANCER METASTASIZED TO LIVER: ICD-10-CM

## 2023-01-27 DIAGNOSIS — C78.7 METASTATIC COLON CANCER TO LIVER: ICD-10-CM

## 2023-01-27 DIAGNOSIS — C18.9 METASTATIC COLON CANCER TO LIVER: ICD-10-CM

## 2023-01-27 PROCEDURE — 25000003 PHARM REV CODE 250: Performed by: STUDENT IN AN ORGANIZED HEALTH CARE EDUCATION/TRAINING PROGRAM

## 2023-01-27 PROCEDURE — 76380 CAT SCAN FOLLOW-UP STUDY: CPT | Mod: 26,59,, | Performed by: RADIOLOGY

## 2023-01-27 PROCEDURE — 76377 PR  3D RENDERING W/ IMAGE POSTPROCESS: ICD-10-PCS | Mod: 26,,, | Performed by: RADIOLOGY

## 2023-01-27 PROCEDURE — 36247 INS CATH ABD/L-EXT ART 3RD: CPT | Mod: RT | Performed by: RADIOLOGY

## 2023-01-27 PROCEDURE — 76937 PR  US GUIDE, VASCULAR ACCESS: ICD-10-PCS | Mod: 26,,, | Performed by: RADIOLOGY

## 2023-01-27 PROCEDURE — C1887 CATHETER, GUIDING: HCPCS

## 2023-01-27 PROCEDURE — 37243 VASC EMBOLIZE/OCCLUDE ORGAN: CPT | Mod: RT | Performed by: RADIOLOGY

## 2023-01-27 PROCEDURE — 77300 RADIATION THERAPY DOSE PLAN: CPT | Mod: TC | Performed by: RADIOLOGY

## 2023-01-27 PROCEDURE — 76377 3D RENDER W/INTRP POSTPROCES: CPT | Mod: TC | Performed by: RADIOLOGY

## 2023-01-27 PROCEDURE — 78830 RP LOCLZJ TUM SPECT W/CT 1: CPT | Mod: 26,,, | Performed by: STUDENT IN AN ORGANIZED HEALTH CARE EDUCATION/TRAINING PROGRAM

## 2023-01-27 PROCEDURE — 75774 ARTERY X-RAY EACH VESSEL: CPT | Mod: 26,59,, | Performed by: RADIOLOGY

## 2023-01-27 PROCEDURE — 36247 PR PLACE CATH SUBSUBSELECT ART,ABD/PEL: ICD-10-PCS | Mod: ,,, | Performed by: RADIOLOGY

## 2023-01-27 PROCEDURE — 78830 NM SPECT/CT SINGLE AREA: ICD-10-PCS | Mod: 26,,, | Performed by: STUDENT IN AN ORGANIZED HEALTH CARE EDUCATION/TRAINING PROGRAM

## 2023-01-27 PROCEDURE — 76937 US GUIDE VASCULAR ACCESS: CPT | Mod: TC | Performed by: RADIOLOGY

## 2023-01-27 PROCEDURE — 99153 MOD SED SAME PHYS/QHP EA: CPT | Mod: 59 | Performed by: RADIOLOGY

## 2023-01-27 PROCEDURE — 37243 IR EMBOLIZATION COMP FOR TUMOR_ORGAN ISCHEMIA_INFARC: ICD-10-PCS | Mod: ,,, | Performed by: RADIOLOGY

## 2023-01-27 PROCEDURE — 76380 PR  CT SCAN,LIMITED/LOCALIZED F/U STUDY: ICD-10-PCS | Mod: 26,59,, | Performed by: RADIOLOGY

## 2023-01-27 PROCEDURE — 25500020 PHARM REV CODE 255: Performed by: RADIOLOGY

## 2023-01-27 PROCEDURE — 76937 US GUIDE VASCULAR ACCESS: CPT | Mod: 26,,, | Performed by: RADIOLOGY

## 2023-01-27 PROCEDURE — 79445 NUCLEAR RX INTRA-ARTERIAL: CPT | Mod: 26,,, | Performed by: RADIOLOGY

## 2023-01-27 PROCEDURE — 78201 NM LIVER IMAGING STATIC POST Y-90 EMBOLIZATION: ICD-10-PCS | Mod: 26,59,, | Performed by: STUDENT IN AN ORGANIZED HEALTH CARE EDUCATION/TRAINING PROGRAM

## 2023-01-27 PROCEDURE — 36247 INS CATH ABD/L-EXT ART 3RD: CPT | Mod: ,,, | Performed by: RADIOLOGY

## 2023-01-27 PROCEDURE — 75726 ARTERY X-RAYS ABDOMEN: CPT | Mod: 26,59,, | Performed by: RADIOLOGY

## 2023-01-27 PROCEDURE — 79445 PR  NUCLEAR THERAPY, INTRA-ARTERIAL: ICD-10-PCS | Mod: 26,,, | Performed by: RADIOLOGY

## 2023-01-27 PROCEDURE — 78830 RP LOCLZJ TUM SPECT W/CT 1: CPT | Mod: TC,59

## 2023-01-27 PROCEDURE — 75726 CHG ANGIO VISCERAL SELECTV/SUBSELEC: ICD-10-PCS | Mod: 26,59,, | Performed by: RADIOLOGY

## 2023-01-27 PROCEDURE — 99152 MOD SED SAME PHYS/QHP 5/>YRS: CPT | Performed by: RADIOLOGY

## 2023-01-27 PROCEDURE — 79445 NUCLEAR RX INTRA-ARTERIAL: CPT | Mod: TC | Performed by: RADIOLOGY

## 2023-01-27 PROCEDURE — 78201 LIVER IMAGING STATIC ONLY: CPT | Mod: 26,59,, | Performed by: STUDENT IN AN ORGANIZED HEALTH CARE EDUCATION/TRAINING PROGRAM

## 2023-01-27 PROCEDURE — 75774 ARTERY X-RAY EACH VESSEL: CPT | Mod: TC | Performed by: RADIOLOGY

## 2023-01-27 PROCEDURE — 76377 3D RENDER W/INTRP POSTPROCES: CPT | Mod: 26,,, | Performed by: RADIOLOGY

## 2023-01-27 PROCEDURE — 75726 ARTERY X-RAYS ABDOMEN: CPT | Mod: TC | Performed by: RADIOLOGY

## 2023-01-27 PROCEDURE — 77300 RADIATION THERAPY DOSE PLAN: CPT | Mod: 26,,, | Performed by: RADIOLOGY

## 2023-01-27 PROCEDURE — 75774 PR  ANGIO EA ADDNL SELECTV VESSEL: ICD-10-PCS | Mod: 26,59,, | Performed by: RADIOLOGY

## 2023-01-27 PROCEDURE — 76380 CAT SCAN FOLLOW-UP STUDY: CPT | Mod: TC,59 | Performed by: RADIOLOGY

## 2023-01-27 PROCEDURE — 78201 LIVER IMAGING STATIC ONLY: CPT | Mod: TC

## 2023-01-27 PROCEDURE — 63600175 PHARM REV CODE 636 W HCPCS: Performed by: STUDENT IN AN ORGANIZED HEALTH CARE EDUCATION/TRAINING PROGRAM

## 2023-01-27 PROCEDURE — G0269 OCCLUSIVE DEVICE IN VEIN ART: HCPCS | Performed by: RADIOLOGY

## 2023-01-27 PROCEDURE — 77300 PR RADIATION THERAPY,DOSIMETRY PLAN: ICD-10-PCS | Mod: 26,,, | Performed by: RADIOLOGY

## 2023-01-27 RX ORDER — MIDAZOLAM HYDROCHLORIDE 1 MG/ML
INJECTION INTRAMUSCULAR; INTRAVENOUS
Status: COMPLETED | OUTPATIENT
Start: 2023-01-27 | End: 2023-01-27

## 2023-01-27 RX ORDER — DEXAMETHASONE SODIUM PHOSPHATE 4 MG/ML
INJECTION, SOLUTION INTRA-ARTICULAR; INTRALESIONAL; INTRAMUSCULAR; INTRAVENOUS; SOFT TISSUE
Status: COMPLETED | OUTPATIENT
Start: 2023-01-27 | End: 2023-01-27

## 2023-01-27 RX ORDER — LIDOCAINE HYDROCHLORIDE 10 MG/ML
INJECTION INFILTRATION; PERINEURAL
Status: COMPLETED | OUTPATIENT
Start: 2023-01-27 | End: 2023-01-27

## 2023-01-27 RX ORDER — LIDOCAINE HYDROCHLORIDE 10 MG/ML
1 INJECTION, SOLUTION EPIDURAL; INFILTRATION; INTRACAUDAL; PERINEURAL ONCE AS NEEDED
Status: DISCONTINUED | OUTPATIENT
Start: 2023-01-27 | End: 2023-01-28 | Stop reason: HOSPADM

## 2023-01-27 RX ORDER — FENTANYL CITRATE 50 UG/ML
INJECTION, SOLUTION INTRAMUSCULAR; INTRAVENOUS
Status: COMPLETED | OUTPATIENT
Start: 2023-01-27 | End: 2023-01-27

## 2023-01-27 RX ORDER — SODIUM CHLORIDE 9 MG/ML
75 INJECTION, SOLUTION INTRAVENOUS CONTINUOUS
Status: DISCONTINUED | OUTPATIENT
Start: 2023-01-27 | End: 2023-01-28 | Stop reason: HOSPADM

## 2023-01-27 RX ADMIN — MIDAZOLAM HYDROCHLORIDE 0.5 MG: 1 INJECTION INTRAMUSCULAR; INTRAVENOUS at 03:01

## 2023-01-27 RX ADMIN — FENTANYL CITRATE 25 MCG: 50 INJECTION, SOLUTION INTRAMUSCULAR; INTRAVENOUS at 03:01

## 2023-01-27 RX ADMIN — FENTANYL CITRATE 50 MCG: 50 INJECTION, SOLUTION INTRAMUSCULAR; INTRAVENOUS at 03:01

## 2023-01-27 RX ADMIN — LIDOCAINE HYDROCHLORIDE 5 ML: 10 INJECTION, SOLUTION INFILTRATION; PERINEURAL at 03:01

## 2023-01-27 RX ADMIN — DEXAMETHASONE SODIUM PHOSPHATE 20 MG: 4 INJECTION, SOLUTION INTRAMUSCULAR; INTRAVENOUS at 03:01

## 2023-01-27 RX ADMIN — IOHEXOL 70 ML: 300 INJECTION, SOLUTION INTRAVENOUS at 04:01

## 2023-01-27 RX ADMIN — MIDAZOLAM HYDROCHLORIDE 1 MG: 1 INJECTION INTRAMUSCULAR; INTRAVENOUS at 03:01

## 2023-01-27 NOTE — PROCEDURES
IR POST PROCEDURE NOTE    Date of Procedure: 1/27/2023    Procedure: Hepatic angiogram, Y90 radioembolization    Pre-Procedure Diagnosis: Colorectal cancer    Post-Procedure Diagnosis: Same    Procedure Personnel: Raul Vidal MD and Humphrey Kirkland MD    Written Informed Consent Obtained: Yes    Specimen Removed: None    Estimated Blood Loss: Minimal    Findings: Hepatic angiogram and Y90 radioembolization performed via anterior division of right hepatic artery. 5 Fr  Vascade at right groin access site.     Complications: None immediate.       Humphrey Kirkland MD  Fellow, Dept. Of Interventional Radiology  [facility]

## 2023-01-27 NOTE — PLAN OF CARE
Pt arrived to IR room 188 for y-90. Pt oriented to unit and staff. Plan of care reviewed with patient, patient verbalizes understanding. Comfort measures utilized. Pt safely transferred from stretcher to procedural table. Fall risk reviewed with patient, fall risk interventions maintained. Safety strap applied, positioner pillows utilized to minimize pressure points. Blankets applied. Pt prepped and draped utilizing standard sterile technique. Patient placed on continuous monitoring, as required by sedation policy. Timeouts completed utilizing standard universal time-out, per department and facility policy. RN to remain at bedside, continuous monitoring maintained. Pt resting comfortably. Denies pain/discomfort. Will continue to monitor. See flow sheets for monitoring, medication administration, and updates.

## 2023-01-27 NOTE — H&P
Vascular and Interventional Radiology History & Physical    Date:  1/27/2023    Chief Complaint:   Colorectal cancer    History of Present Illness:  Javier Downs is a 72 y.o. male who presents for hepatic angiogram and Y90 radioembolization of metastatic colorectal cancer.    Past Medical History:  Past Medical History:   Diagnosis Date    MARIA A (acute kidney injury) 8/18/2022    Hypertension     Metastatic colon cancer to liver 4/22/2021       Past Surgical History:  Past Surgical History:   Procedure Laterality Date    COLONOSCOPY N/A 4/20/2021    Procedure: COLONOSCOPY with possible stent;  Surgeon: EDILMA Bonilla MD;  Location: [facility] ENDO (2ND FLR);  Service: Colon and Rectal;  Laterality: N/A;    DIAGNOSTIC LAPAROSCOPY N/A 9/7/2022    Procedure: LAPAROSCOPY, DIAGNOSTIC;  Surgeon: Adrian Rodriguez MD;  Location: [facility] OR 2ND FLR;  Service: General;  Laterality: N/A;    INSERTION OF TUNNELED CENTRAL VENOUS CATHETER (CVC) WITH SUBCUTANEOUS PORT N/A 5/3/2021    Procedure: [id number];  Surgeon: Francisco Layton MD;  Location: [facility] OR 2ND FLR;  Service: Vascular;  Laterality: N/A;    OMENTECTOMY N/A 10/20/2022    Procedure: OMENTECTOMY;  Surgeon: EDILMA Bonilla MD;  Location: [facility] OR 2ND FLR;  Service: Colon and Rectal;  Laterality: N/A;    RIGHT HEMICOLECTOMY N/A 10/20/2022    Procedure: HEMICOLECTOMY, RIGHT, extended;  Surgeon: EDILMA Bonilla MD;  Location: [facility] OR 2ND FLR;  Service: Colon and Rectal;  Laterality: N/A;  Extended right hemicolectomy CONSENT IN AM        Sedation History:    Denies any adverse reactions.  Denies problems laying flat.    Social History:  Social History     Tobacco Use    Smoking status: Never    Smokeless tobacco: Never   Substance Use Topics    Alcohol use: Not Currently    Drug use: Never        Home Medications:   Prior to Admission medications    Medication Sig Start Date End Date Taking? Authorizing Provider   amLODIPine (NORVASC) 10 MG tablet Take 1 tablet (10 mg  total) by mouth once daily. 8/3/22 3/22/23 Yes Anali Hernandez PA-C   acetaminophen (TYLENOL) 500 MG tablet Take 1 tablet (500 mg total) by mouth every 6 (six) hours as needed for Pain (alternate with ibuprofen). 5/3/21   FRANKLIN Delarosa MD   ibuprofen (ADVIL,MOTRIN) 400 MG tablet Take 1 tablet (400 mg total) by mouth every 6 (six) hours as needed for Other (pain). Alternate with tylenol 5/3/21   FRANKLIN Delarosa MD   ondansetron (ZOFRAN) 4 MG tablet Take 1 tablet (4 mg total) by mouth every 8 (eight) hours as needed for Nausea. 4/21/21   Thomas Monroe MD   oxyCODONE (ROXICODONE) 5 MG immediate release tablet Take 1 tablet (5 mg total) by mouth every 6 (six) hours as needed for Pain. 11/2/22   Elsy Kelly NP   tamsulosin (FLOMAX) 0.4 mg Cap Take 1 capsule (0.4 mg total) by mouth once daily. 11/16/22 11/16/23  Bunny Schuster MD       Inpatient Medications:    Current Outpatient Medications:     amLODIPine (NORVASC) 10 MG tablet, Take 1 tablet (10 mg total) by mouth once daily., Disp: 90 tablet, Rfl: 3    acetaminophen (TYLENOL) 500 MG tablet, Take 1 tablet (500 mg total) by mouth every 6 (six) hours as needed for Pain (alternate with ibuprofen)., Disp: , Rfl: 0    ibuprofen (ADVIL,MOTRIN) 400 MG tablet, Take 1 tablet (400 mg total) by mouth every 6 (six) hours as needed for Other (pain). Alternate with tylenol, Disp: , Rfl:     ondansetron (ZOFRAN) 4 MG tablet, Take 1 tablet (4 mg total) by mouth every 8 (eight) hours as needed for Nausea., Disp: 30 tablet, Rfl: 3    oxyCODONE (ROXICODONE) 5 MG immediate release tablet, Take 1 tablet (5 mg total) by mouth every 6 (six) hours as needed for Pain., Disp: 20 tablet, Rfl: 0    tamsulosin (FLOMAX) 0.4 mg Cap, Take 1 capsule (0.4 mg total) by mouth once daily., Disp: 30 capsule, Rfl: 5    Current Facility-Administered Medications:     0.9%  NaCl infusion, 75 mL/hr, Intravenous, Continuous, Nilsa Flores PA-C    LIDOcaine (PF) 10 mg/ml (1%) injection 10  mg, 1 mL, Intradermal, Once PRN, Nilsa Flores PA-C     Anticoagulants/Antiplatelets:   no anticoagulation    Allergies:   Review of patient's allergies indicates:  No Known Allergies    Review of Systems:   As documented in primary provider H&P.    Vital Signs (Most Recent):  Temp: 98 °F (36.7 °C) (01/27/23 1029)  Pulse: 104 (01/27/23 1029)  Resp: 20 (01/27/23 1029)  BP: 114/71 (01/27/23 1029)  SpO2: 98 % (01/27/23 1029)    Physical Exam:  No acute distress, laying comfortably in bed, pleasant and cooperative  Regular rate and rhythm  Breathing unlabored  Abdomen benign  Extremities warm and well perfused    Sedation Exam:  ASA: III - Patient appears to have severe systemic disease not posing a constant threat to life   Mallampati: II (hard and soft palate, upper portion of tonsils anduvula visible)     Laboratory:  Lab Results   Component Value Date    INR 1.1 01/27/2023       Lab Results   Component Value Date    WBC 10.76 01/27/2023    HGB 10.2 (L) 01/27/2023    HCT 34.3 (L) 01/27/2023    MCV 88 01/27/2023     01/27/2023      Lab Results   Component Value Date     (H) 01/27/2023     01/27/2023    K 4.8 01/27/2023     01/27/2023    CO2 24 01/27/2023    BUN 13 01/27/2023    CREATININE 0.7 01/27/2023    CALCIUM 10.3 01/27/2023    MG 1.9 11/02/2022    ALT 6 (L) 01/27/2023    AST 20 01/27/2023    ALBUMIN 2.6 (L) 01/27/2023    BILITOT 0.5 01/27/2023    BILIDIR 0.2 01/27/2023       Imaging:  Reviewed.      ASSESSMENT/PLAN:   72M with metastatic CRC to the liver, here for hepatic angiogram and Y90 radioembolization.     Procedure discussed in detail with patient and family at bedside. Informed consent was obtained.     Will plan for procedure with moderate intravenous conscious sedation.    Humphrey Kirkland MD  Fellow, Dept. Of Interventional Radiology  [facility]

## 2023-01-27 NOTE — PLAN OF CARE
Patient arrived to room. PIV placed. Admit assessment completed. Plan of care discussed with patient. Will monitor. Call light within reach, instructed in use.

## 2023-01-27 NOTE — CARE UPDATE
patient transported to Nuclear Medicine via stretcher in [facility], see chart for vital signs and assessment, will continue to monitor patient throughout scan.

## 2023-01-27 NOTE — CARE UPDATE
Nuclear Medicine scan complete at this time, patient transported back to MPU 5 via stretcher in [facility], bedside report given to ARACELIS Blood.

## 2023-01-27 NOTE — DISCHARGE SUMMARY
Radiology Discharge Summary      Hospital Course: No complications    Admit Date: 1/27/2023  Discharge Date: 01/27/2023     Instructions Given to Patient: Yes  Diet: Resume prior diet  Activity: no lifting, Driving, or Strenuous exercise for 1 week.    Description of Condition on Discharge: Stable  Vital Signs (Most Recent): Temp: 98 °F (36.7 °C) (01/27/23 1029)  Pulse: 65 (01/27/23 1600)  Resp: 15 (01/27/23 1600)  BP: 107/67 (01/27/23 1600)  SpO2: 100 % (01/27/23 1600)    Discharge Disposition: Home    Discharge Diagnosis: Status post Y90 radioembolization without immediate complication.    Follow Up: Patient to go for MRI, CMP, and CEA in 1 month with IR clinic visit to follow.    Humphrey Kirkland MD  Fellow, Dept.Of Interventional Radiology  [facility]

## 2023-01-27 NOTE — DISCHARGE INSTRUCTIONS
For scheduling: Call at [phone]    For questions or concerns call: [facility] 8 AM- 5PM [phone]. Radiology resident on call [phone].    For immediate concerns that are not emergent, you may call our radiology clinic at: [phone]     Discharge Instructions for Hepatic Angiography  You had a procedure called hepatic angiography. This is an X-ray study of the blood vessels that supply your liver. During  the procedure, a catheter (thin, flexible tube) was inserted into one of your blood vessels through a small incision. A  specially trained doctor called an interventional radiologist usually does the procedure. Heres what to do at home  afterward.  Home care  Follow your doctor's recommendations on when it is safe to drive after the procedure.  Rest according to your doctor's instructions after the procedure. Most people are able to resume normal activity  within a few days.  Dont lift anything heavier than 10 pounds for 3 to 4 days.  Avoid strenuous activity for 2 weeks after the procedure.  Exercise according to your doctors recommendations.  You can shower the day after the procedure.  Ask your doctor when it is safe to swim or take a bath.  Take your medications exactly as directed. Dont skip doses.  Unless directed otherwise, drink 6 to 8 glasses of water a day to prevent dehydration and to help flush your body of  the dye that was used during your procedure.  Take your temperature and check the place where your incision was made for signs of infection (redness, swelling, bleeding or  warmth) every day for a week.  Report any numbness or coolness to the extremity. Report any discoloration to the puncture site or the extremity.  Follow-up care  Make a follow-up appointment as directed by our staff.  Ask your doctor when you can return to work.  Date Last Reviewed: 6/14/2015  © 5486-2958 [employer]. [address] [place] [address], [place] [address]. All rights reserved. This  information  is not intended as a substitute for professional medical care. Always follow your healthcare professional's instructions.  Post Yttrium (Y-90) Instructions    Pregnant women should be no closer to the patient than 3 feet for a period of 3 days.    Children under 10 years of age should be no closer than 3 feet for 3 days, although brief contact with the patient for a few seconds is acceptable.    Spouse, partner, and family should stay at least 3 feet away for 3 days. Brief contact with the patient is acceptable.

## 2023-01-27 NOTE — PLAN OF CARE
Y-90 completed, pt tolerated well. 5Fr vascade deployed to R groin, hemostasis achieved at 1558. Patient to lay flat for 2 hours per Dr. Kirkland, elevate HOB at 1758. No apparent distress noted. Dressing applied CDI. R DP 2 +. Patient to be transfer to MPU awaiting on nuclear medicine available. Report to be given at bedside.

## 2023-01-27 NOTE — CARE UPDATE
Pt arrived to MPU 6 for recovery of a Y90. Pt still needs to go to Nuc Med. See vs and assessment in the computer.

## 2023-01-28 ENCOUNTER — INFUSION (OUTPATIENT)
Dept: INFUSION THERAPY | Facility: HOSPITAL | Age: 73
End: 2023-01-28
Payer: MEDICARE

## 2023-01-28 VITALS
HEART RATE: 94 BPM | TEMPERATURE: 97 F | HEIGHT: 67 IN | RESPIRATION RATE: 18 BRPM | SYSTOLIC BLOOD PRESSURE: 118 MMHG | BODY MASS INDEX: 17.3 KG/M2 | DIASTOLIC BLOOD PRESSURE: 66 MMHG | WEIGHT: 110.25 LBS

## 2023-01-28 DIAGNOSIS — C18.9 METASTATIC COLON CANCER TO LIVER: Primary | ICD-10-CM

## 2023-01-28 DIAGNOSIS — C78.7 METASTATIC COLON CANCER TO LIVER: Primary | ICD-10-CM

## 2023-01-28 PROCEDURE — A4216 STERILE WATER/SALINE, 10 ML: HCPCS | Performed by: INTERNAL MEDICINE

## 2023-01-28 PROCEDURE — 96523 IRRIG DRUG DELIVERY DEVICE: CPT

## 2023-01-28 PROCEDURE — 25000003 PHARM REV CODE 250: Performed by: INTERNAL MEDICINE

## 2023-01-28 PROCEDURE — 63600175 PHARM REV CODE 636 W HCPCS: Performed by: INTERNAL MEDICINE

## 2023-01-28 RX ORDER — SODIUM CHLORIDE 0.9 % (FLUSH) 0.9 %
10 SYRINGE (ML) INJECTION
Status: DISCONTINUED | OUTPATIENT
Start: 2023-01-28 | End: 2023-01-28 | Stop reason: HOSPADM

## 2023-01-28 RX ORDER — HEPARIN 100 UNIT/ML
500 SYRINGE INTRAVENOUS
Status: DISCONTINUED | OUTPATIENT
Start: 2023-01-28 | End: 2023-01-28 | Stop reason: HOSPADM

## 2023-01-28 RX ADMIN — Medication 10 ML: at 10:01

## 2023-01-28 RX ADMIN — HEPARIN 500 UNITS: 100 SYRINGE at 10:01

## 2023-01-28 NOTE — NURSING
Pt tolerated pump d/c today. NAD. Port flushed + blood return present, flushed. Hep lock and deaccesed. declined AVS. Uses my [facility]. Discharged home. Ambulated independently.

## 2023-01-28 NOTE — CARE UPDATE
Pt up at 17:58, right groin CDI without s/s of bleeding or hematoma. Pt fully recovered and states full understanding of discharge. Pt and family left with escort for [facility].

## 2023-01-30 DIAGNOSIS — C18.9 COLON CANCER METASTASIZED TO LIVER: Primary | ICD-10-CM

## 2023-01-30 DIAGNOSIS — C78.7 COLON CANCER METASTASIZED TO LIVER: Primary | ICD-10-CM

## 2023-02-01 ENCOUNTER — DOCUMENTATION ONLY (OUTPATIENT)
Dept: HEMATOLOGY/ONCOLOGY | Facility: CLINIC | Age: 73
End: 2023-02-01
Payer: MEDICARE

## 2023-02-01 NOTE — PROGRESS NOTES
[facility]     Colorectal Liver Metastasis Tumor Board Note      Date: 1/26/23    Case Overview: Mr. 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 He has received FOLFOX + dwight and Y-90. In 9/2022, he was restarted on FOLFOX.     Participants: medical oncology, surgical oncology, colorectal surgery, transplant surgeons, interventional radiology, and oncology navigator     Imaging Reviewed: 1/23/23 - CT CAP     Radiology Review: Most recent CT shows no evidence of new disease. Post treatment changes in post right hepatic lobe. Pt to undergo ant right hepatic lobe treatment.     Orders/Referrals: no new orders placed.     Board Recommendations: Continue IR intervention. Scheduled for Y90 tomorrow. Will likely continue on maintenance chemotherapy. Evaluate for resection post-completion of IR treatments.

## 2023-02-02 RX ORDER — METHOCARBAMOL 500 MG/1
500 TABLET, FILM COATED ORAL 3 TIMES DAILY
Qty: 90 TABLET | Refills: 0 | Status: SHIPPED | OUTPATIENT
Start: 2023-02-02 | End: 2023-03-15

## 2023-02-09 ENCOUNTER — OFFICE VISIT (OUTPATIENT)
Dept: HEMATOLOGY/ONCOLOGY | Facility: CLINIC | Age: 73
End: 2023-02-09
Payer: MEDICARE

## 2023-02-09 ENCOUNTER — INFUSION (OUTPATIENT)
Dept: INFUSION THERAPY | Facility: HOSPITAL | Age: 73
End: 2023-02-09
Attending: INTERNAL MEDICINE
Payer: MEDICARE

## 2023-02-09 VITALS
SYSTOLIC BLOOD PRESSURE: 104 MMHG | RESPIRATION RATE: 16 BRPM | HEART RATE: 80 BPM | BODY MASS INDEX: 17.28 KG/M2 | TEMPERATURE: 98 F | WEIGHT: 110.13 LBS | DIASTOLIC BLOOD PRESSURE: 65 MMHG | HEIGHT: 67 IN

## 2023-02-09 VITALS
SYSTOLIC BLOOD PRESSURE: 110 MMHG | WEIGHT: 110.13 LBS | HEART RATE: 92 BPM | HEIGHT: 67 IN | RESPIRATION RATE: 18 BRPM | DIASTOLIC BLOOD PRESSURE: 70 MMHG | TEMPERATURE: 98 F | OXYGEN SATURATION: 100 % | BODY MASS INDEX: 17.28 KG/M2

## 2023-02-09 DIAGNOSIS — Z79.899 IMMUNODEFICIENCY DUE TO CHEMOTHERAPY: ICD-10-CM

## 2023-02-09 DIAGNOSIS — K59.03 DRUG-INDUCED CONSTIPATION: ICD-10-CM

## 2023-02-09 DIAGNOSIS — I10 PRIMARY HYPERTENSION: ICD-10-CM

## 2023-02-09 DIAGNOSIS — E43 SEVERE MALNUTRITION: ICD-10-CM

## 2023-02-09 DIAGNOSIS — D49.9 IMMUNODEFICIENCY SECONDARY TO NEOPLASM: ICD-10-CM

## 2023-02-09 DIAGNOSIS — D64.81 ANEMIA ASSOCIATED WITH CHEMOTHERAPY: ICD-10-CM

## 2023-02-09 DIAGNOSIS — R39.9 LOWER URINARY TRACT SYMPTOMS (LUTS): ICD-10-CM

## 2023-02-09 DIAGNOSIS — T45.1X5A ANEMIA ASSOCIATED WITH CHEMOTHERAPY: ICD-10-CM

## 2023-02-09 DIAGNOSIS — R63.4 WEIGHT DECREASE: ICD-10-CM

## 2023-02-09 DIAGNOSIS — C18.9 METASTATIC COLON CANCER TO LIVER: Primary | ICD-10-CM

## 2023-02-09 DIAGNOSIS — D84.821 IMMUNODEFICIENCY DUE TO CHEMOTHERAPY: ICD-10-CM

## 2023-02-09 DIAGNOSIS — C78.7 METASTATIC COLON CANCER TO LIVER: Primary | ICD-10-CM

## 2023-02-09 DIAGNOSIS — R52 PAIN: ICD-10-CM

## 2023-02-09 DIAGNOSIS — D84.81 IMMUNODEFICIENCY SECONDARY TO NEOPLASM: ICD-10-CM

## 2023-02-09 DIAGNOSIS — T45.1X5A IMMUNODEFICIENCY DUE TO CHEMOTHERAPY: ICD-10-CM

## 2023-02-09 DIAGNOSIS — N17.9 AKI (ACUTE KIDNEY INJURY): ICD-10-CM

## 2023-02-09 PROCEDURE — 25000003 PHARM REV CODE 250

## 2023-02-09 PROCEDURE — 96375 TX/PRO/DX INJ NEW DRUG ADDON: CPT

## 2023-02-09 PROCEDURE — 96367 TX/PROPH/DG ADDL SEQ IV INF: CPT

## 2023-02-09 PROCEDURE — 96365 THER/PROPH/DIAG IV INF INIT: CPT | Mod: 59

## 2023-02-09 PROCEDURE — 1101F PR PT FALLS ASSESS DOC 0-1 FALLS W/OUT INJ PAST YR: ICD-10-PCS | Mod: CPTII,S$GLB,, | Performed by: REGISTERED NURSE

## 2023-02-09 PROCEDURE — 99215 OFFICE O/P EST HI 40 MIN: CPT | Mod: S$GLB,,, | Performed by: REGISTERED NURSE

## 2023-02-09 PROCEDURE — 3008F BODY MASS INDEX DOCD: CPT | Mod: CPTII,S$GLB,, | Performed by: REGISTERED NURSE

## 2023-02-09 PROCEDURE — 99215 PR OFFICE/OUTPT VISIT, EST, LEVL V, 40-54 MIN: ICD-10-PCS | Mod: S$GLB,,, | Performed by: REGISTERED NURSE

## 2023-02-09 PROCEDURE — 3078F PR MOST RECENT DIASTOLIC BLOOD PRESSURE < 80 MM HG: ICD-10-PCS | Mod: CPTII,S$GLB,, | Performed by: REGISTERED NURSE

## 2023-02-09 PROCEDURE — 3008F PR BODY MASS INDEX (BMI) DOCUMENTED: ICD-10-PCS | Mod: CPTII,S$GLB,, | Performed by: REGISTERED NURSE

## 2023-02-09 PROCEDURE — 63600175 PHARM REV CODE 636 W HCPCS

## 2023-02-09 PROCEDURE — 1160F RVW MEDS BY RX/DR IN RCRD: CPT | Mod: CPTII,S$GLB,, | Performed by: REGISTERED NURSE

## 2023-02-09 PROCEDURE — 3074F SYST BP LT 130 MM HG: CPT | Mod: CPTII,S$GLB,, | Performed by: REGISTERED NURSE

## 2023-02-09 PROCEDURE — 1160F PR REVIEW ALL MEDS BY PRESCRIBER/CLIN PHARMACIST DOCUMENTED: ICD-10-PCS | Mod: CPTII,S$GLB,, | Performed by: REGISTERED NURSE

## 2023-02-09 PROCEDURE — 3288F PR FALLS RISK ASSESSMENT DOCUMENTED: ICD-10-PCS | Mod: CPTII,S$GLB,, | Performed by: REGISTERED NURSE

## 2023-02-09 PROCEDURE — 25000003 PHARM REV CODE 250: Performed by: REGISTERED NURSE

## 2023-02-09 PROCEDURE — 3078F DIAST BP <80 MM HG: CPT | Mod: CPTII,S$GLB,, | Performed by: REGISTERED NURSE

## 2023-02-09 PROCEDURE — 1159F MED LIST DOCD IN RCRD: CPT | Mod: CPTII,S$GLB,, | Performed by: REGISTERED NURSE

## 2023-02-09 PROCEDURE — 63600175 PHARM REV CODE 636 W HCPCS: Performed by: REGISTERED NURSE

## 2023-02-09 PROCEDURE — 1159F PR MEDICATION LIST DOCUMENTED IN MEDICAL RECORD: ICD-10-PCS | Mod: CPTII,S$GLB,, | Performed by: REGISTERED NURSE

## 2023-02-09 PROCEDURE — 1126F AMNT PAIN NOTED NONE PRSNT: CPT | Mod: CPTII,S$GLB,, | Performed by: REGISTERED NURSE

## 2023-02-09 PROCEDURE — 3288F FALL RISK ASSESSMENT DOCD: CPT | Mod: CPTII,S$GLB,, | Performed by: REGISTERED NURSE

## 2023-02-09 PROCEDURE — 99999 PR PBB SHADOW E&M-EST. PATIENT-LVL III: ICD-10-PCS | Mod: PBBFAC,,, | Performed by: REGISTERED NURSE

## 2023-02-09 PROCEDURE — 1101F PT FALLS ASSESS-DOCD LE1/YR: CPT | Mod: CPTII,S$GLB,, | Performed by: REGISTERED NURSE

## 2023-02-09 PROCEDURE — 3074F PR MOST RECENT SYSTOLIC BLOOD PRESSURE < 130 MM HG: ICD-10-PCS | Mod: CPTII,S$GLB,, | Performed by: REGISTERED NURSE

## 2023-02-09 PROCEDURE — 1126F PR PAIN SEVERITY QUANTIFIED, NO PAIN PRESENT: ICD-10-PCS | Mod: CPTII,S$GLB,, | Performed by: REGISTERED NURSE

## 2023-02-09 PROCEDURE — 96416 CHEMO PROLONG INFUSE W/PUMP: CPT

## 2023-02-09 PROCEDURE — 99999 PR PBB SHADOW E&M-EST. PATIENT-LVL III: CPT | Mod: PBBFAC,,, | Performed by: REGISTERED NURSE

## 2023-02-09 PROCEDURE — 99213 OFFICE O/P EST LOW 20 MIN: CPT | Mod: PBBFAC,25 | Performed by: REGISTERED NURSE

## 2023-02-09 RX ORDER — HEPARIN 100 UNIT/ML
500 SYRINGE INTRAVENOUS
Status: CANCELLED | OUTPATIENT
Start: 2023-02-10

## 2023-02-09 RX ORDER — SODIUM CHLORIDE 0.9 % (FLUSH) 0.9 %
10 SYRINGE (ML) INJECTION
Status: CANCELLED | OUTPATIENT
Start: 2023-02-12

## 2023-02-09 RX ORDER — EPINEPHRINE 0.3 MG/.3ML
0.3 INJECTION SUBCUTANEOUS ONCE AS NEEDED
Status: DISCONTINUED | OUTPATIENT
Start: 2023-02-09 | End: 2023-02-09 | Stop reason: HOSPADM

## 2023-02-09 RX ORDER — DIPHENHYDRAMINE HYDROCHLORIDE 50 MG/ML
25 INJECTION INTRAMUSCULAR; INTRAVENOUS
Status: CANCELLED
Start: 2023-02-12

## 2023-02-09 RX ORDER — HEPARIN 100 UNIT/ML
500 SYRINGE INTRAVENOUS
Status: CANCELLED | OUTPATIENT
Start: 2023-02-12

## 2023-02-09 RX ORDER — EPINEPHRINE 0.3 MG/.3ML
0.3 INJECTION SUBCUTANEOUS ONCE AS NEEDED
Status: CANCELLED | OUTPATIENT
Start: 2023-02-10

## 2023-02-09 RX ORDER — DIPHENHYDRAMINE HYDROCHLORIDE 50 MG/ML
25 INJECTION INTRAMUSCULAR; INTRAVENOUS
Status: CANCELLED
Start: 2023-02-10

## 2023-02-09 RX ORDER — DIPHENHYDRAMINE HYDROCHLORIDE 50 MG/ML
50 INJECTION INTRAMUSCULAR; INTRAVENOUS ONCE AS NEEDED
Status: DISCONTINUED | OUTPATIENT
Start: 2023-02-09 | End: 2023-02-09 | Stop reason: HOSPADM

## 2023-02-09 RX ORDER — DIPHENHYDRAMINE HYDROCHLORIDE 50 MG/ML
25 INJECTION INTRAMUSCULAR; INTRAVENOUS
Status: COMPLETED | OUTPATIENT
Start: 2023-02-09 | End: 2023-02-09

## 2023-02-09 RX ORDER — DIPHENHYDRAMINE HYDROCHLORIDE 50 MG/ML
50 INJECTION INTRAMUSCULAR; INTRAVENOUS ONCE AS NEEDED
Status: CANCELLED | OUTPATIENT
Start: 2023-02-10

## 2023-02-09 RX ORDER — HEPARIN 100 UNIT/ML
500 SYRINGE INTRAVENOUS
Status: DISCONTINUED | OUTPATIENT
Start: 2023-02-09 | End: 2023-02-09 | Stop reason: HOSPADM

## 2023-02-09 RX ORDER — SODIUM CHLORIDE 0.9 % (FLUSH) 0.9 %
10 SYRINGE (ML) INJECTION
Status: CANCELLED | OUTPATIENT
Start: 2023-02-10

## 2023-02-09 RX ORDER — SODIUM CHLORIDE 0.9 % (FLUSH) 0.9 %
10 SYRINGE (ML) INJECTION
Status: DISCONTINUED | OUTPATIENT
Start: 2023-02-09 | End: 2023-02-09 | Stop reason: HOSPADM

## 2023-02-09 RX ADMIN — SODIUM CHLORIDE: 9 INJECTION, SOLUTION INTRAVENOUS at 12:02

## 2023-02-09 RX ADMIN — FLUOROURACIL 3695 MG: 50 INJECTION, SOLUTION INTRAVENOUS at 01:02

## 2023-02-09 RX ADMIN — DIPHENHYDRAMINE HYDROCHLORIDE 25 MG: 50 INJECTION, SOLUTION INTRAMUSCULAR; INTRAVENOUS at 12:02

## 2023-02-09 NOTE — PLAN OF CARE
Patient placed on 5fu cadd pump. Tolerated well. Infusing at 2.3 ml/hr. Settings verified by 2 RNs. RUN appears and volume decreasing prior to discharge. Plan to rtc Sat at 0930 am for pump dc. NAD noted upon discharge. Discharged home, ambulated independently with daughter by side.

## 2023-02-09 NOTE — PROGRESS NOTES
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 Bonilla  12/30/2022: Y-90 to R liver  1/27/2023: Y-90 to R liver    Oncological History copied from medical chart:   Mr. Downs is a 72 y.o. male   70 years old pleasant AA gentleman, with history of hypertension, presenting with two weeks duration of abdominal cramps, diarrhea, nausea and vomiting. His symptoms started gradually with intermittent generalize crampy abdominal pain, worse with food. That was associated with nausea, reflux and occaisonal vomiting. He tried pepto-bismol and imodium with no relief, and hence he presented to ER.   He lost significant amount of weight, but cannot quantify the amount or the time period. He has also been feeling weaker than usual.   He hasn't seen a healthcare provider in over than 10 years. He has never had a colonoscopy.   In the ER. His labs were significant of microcytic anemia of 9.7 g/dl, MCV 77, and Platelets counts of 495. CT of the abdomen and pelvis showed " Large mass in the transverse colon highly suspicious for adenocarcinoma resulting in at least high-grade partial obstruction with dilation of proximal colon and small bowel. Soft tissue nodules in the adjacent mesentery are suspicious for pily metastases and/or mesenteric implants.  Numerous hepatic masses " "measuring up to 11.2 cm most likely related to metastatic disease.  There also several small subcapsular lesions which could reflect additional metastatic disease"   CT chest was negative to metastatic disease.   He underwent colonoscopy and stent placement. 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 HER 2 negative, VIRI/SHERI NGS was cancelled due to insufficient quantity   Guardant 360 was sent, negative for KRAS, NRAS, BRAF     He started palliative chemotherapy with FOLFOX+Pema     Interval History:   He presents today with his daughter for follow-up prior to cycle 42 of FOLFOX. Continues to do well overall. Underwent second Y90 ~2 weeks ago and recovered well. Energy and appetite levels remain stable. Staying hydrated. Bowels moving regularly. Urinary hesitancy has improved since taking flomax. Notes R rib/flank pain is improved, still occurs intermittently. He feels this is his "liver reacting to the medicine." No other new complaints. Denies fever/chills, SOB, CP, palpitations, N/V, C/D, pain, blood in urine/stool, paresthesias.     Presents with his daughter. ECOG status is 1.       Past Medical History:   Past Medical History:   Diagnosis Date    MARIA A (acute kidney injury) 8/18/2022    Hypertension     Metastatic colon cancer to liver 4/22/2021       Past Surgical HIstory:   Past Surgical History:   Procedure Laterality Date    COLONOSCOPY N/A 4/20/2021    Procedure: COLONOSCOPY with possible stent;  Surgeon: EDILMA Bonilla MD;  Location: [facility] ([address] [place] [address]);  Service: Colon and Rectal;  Laterality: N/A;    DIAGNOSTIC LAPAROSCOPY N/A 9/7/2022    Procedure: LAPAROSCOPY, DIAGNOSTIC;  Surgeon: Adrian Rodriguez MD;  Location: [facility] OR [address] [place] [address];  Service: General;  Laterality: N/A;    INSERTION OF TUNNELED CENTRAL VENOUS CATHETER (CVC) WITH SUBCUTANEOUS PORT N/A " 5/3/2021    Procedure: [id number];  Surgeon: Francisco Layton MD;  Location: [facility] OR 2ND FLR;  Service: Vascular;  Laterality: N/A;    OMENTECTOMY N/A 10/20/2022    Procedure: OMENTECTOMY;  Surgeon: EDILMA Bonilla MD;  Location: [facility] OR 2ND FLR;  Service: Colon and Rectal;  Laterality: N/A;    RIGHT HEMICOLECTOMY N/A 10/20/2022    Procedure: HEMICOLECTOMY, RIGHT, extended;  Surgeon: EDILMA Bonilla MD;  Location: [facility] OR 2ND FLR;  Service: Colon and Rectal;  Laterality: N/A;  Extended right hemicolectomy CONSENT IN AM       Family History: No family history on file.    Social History:  reports that he has never smoked. He has never used smokeless tobacco. He reports that he does not currently use alcohol. He reports that he does not use drugs.    Allergies:  Review of patient's allergies indicates:  No Known Allergies    Medications:  Current Outpatient Medications   Medication Sig Dispense Refill    acetaminophen (TYLENOL) 500 MG tablet Take 1 tablet (500 mg total) by mouth every 6 (six) hours as needed for Pain (alternate with ibuprofen).  0    amLODIPine (NORVASC) 10 MG tablet Take 1 tablet (10 mg total) by mouth once daily. 90 tablet 3    ibuprofen (ADVIL,MOTRIN) 400 MG tablet Take 1 tablet (400 mg total) by mouth every 6 (six) hours as needed for Other (pain). Alternate with tylenol      methocarbamoL (ROBAXIN) 500 MG Tab Take 1 tablet (500 mg total) by mouth 3 (three) times daily. 90 tablet 0    ondansetron (ZOFRAN) 4 MG tablet Take 1 tablet (4 mg total) by mouth every 8 (eight) hours as needed for Nausea. 30 tablet 3    oxyCODONE (ROXICODONE) 5 MG immediate release tablet Take 1 tablet (5 mg total) by mouth every 6 (six) hours as needed for Pain. 20 tablet 0    tamsulosin (FLOMAX) 0.4 mg Cap Take 1 capsule (0.4 mg total) by mouth once daily. 30 capsule 5     No current facility-administered medications for this visit.       Review of Systems   Constitutional:  Positive for fatigue. Negative for  "activity change, appetite change, chills, diaphoresis, fever and unexpected weight change.   HENT:  Negative for congestion, mouth sores, nosebleeds, postnasal drip, rhinorrhea, trouble swallowing and voice change.    Eyes:  Negative for pain and visual disturbance.   Respiratory:  Negative for cough, chest tightness, shortness of breath and wheezing.    Cardiovascular:  Negative for chest pain, palpitations and leg swelling.   Gastrointestinal:  Negative for abdominal distention, abdominal pain, blood in stool, constipation, diarrhea, nausea and vomiting.   Genitourinary:  Negative for difficulty urinating, dysuria, flank pain, frequency, hematuria and urgency.   Musculoskeletal:  Negative for arthralgias, back pain and myalgias.   Skin:  Negative for rash and wound.   Neurological:  Negative for dizziness, facial asymmetry, weakness, light-headedness, numbness and headaches.   Psychiatric/Behavioral:  Negative for agitation, behavioral problems, confusion, decreased concentration, dysphoric mood and sleep disturbance. The patient is not nervous/anxious.      ECOG Performance Status: 1     Objective:      Vitals:   Vitals:    02/09/23 1043   BP: 110/70   BP Location: Left arm   Patient Position: Sitting   BP Method: Small (Automatic)   Pulse: 92   Resp: 18   Temp: 98.1 °F (36.7 °C)   TempSrc: Oral   SpO2: 100%   Weight: 50 kg (110 lb 1.9 oz)   Height: 5' 7" (1.702 m)       Physical Exam  Vitals reviewed.   Constitutional:       General: He is not in acute distress.     Appearance: Normal appearance. He is not ill-appearing, toxic-appearing or diaphoretic.   HENT:      Head: Normocephalic and atraumatic.      Right Ear: External ear normal.      Left Ear: External ear normal.   Eyes:      General: No scleral icterus.     Extraocular Movements: Extraocular movements intact.      Conjunctiva/sclera: Conjunctivae normal.      Pupils: Pupils are equal, round, and reactive to light.   Cardiovascular:      Rate and " Rhythm: Normal rate and regular rhythm.      Pulses: Normal pulses.      Heart sounds: Normal heart sounds. No murmur heard.  Pulmonary:      Effort: Pulmonary effort is normal. No respiratory distress.      Breath sounds: Normal breath sounds. No wheezing.   Chest:      Comments: Port to RCW, no signs of infection.  Abdominal:      General: Abdomen is flat. Bowel sounds are normal. There is no distension.      Palpations: Abdomen is soft. There is no mass.      Tenderness: There is no abdominal tenderness.      Comments: Vertical midline abdominal wound well healed   Musculoskeletal:         General: No swelling or deformity. Normal range of motion.      Right lower leg: No edema.      Left lower leg: No edema.   Skin:     Coloration: Skin is not jaundiced or pale.      Findings: No bruising, erythema or rash.   Neurological:      General: No focal deficit present.      Mental Status: He is alert and oriented to person, place, and time. Mental status is at baseline.      Cranial Nerves: No cranial nerve deficit.      Sensory: No sensory deficit.      Gait: Gait normal.   Psychiatric:         Mood and Affect: Mood normal.         Behavior: Behavior normal.         Thought Content: Thought content normal.         Judgment: Judgment normal.     Laboratory Data:  Lab Visit on 02/09/2023   Component Date Value Ref Range Status    WBC 02/09/2023 6.78  3.90 - 12.70 K/uL Final    RBC 02/09/2023 3.78 (L)  4.60 - 6.20 M/uL Final    Hemoglobin 02/09/2023 9.8 (L)  14.0 - 18.0 g/dL Final    Hematocrit 02/09/2023 33.1 (L)  40.0 - 54.0 % Final    MCV 02/09/2023 88  82 - 98 fL Final    MCH 02/09/2023 25.9 (L)  27.0 - 31.0 pg Final    MCHC 02/09/2023 29.6 (L)  32.0 - 36.0 g/dL Final    RDW 02/09/2023 16.4 (H)  11.5 - 14.5 % Final    Platelets 02/09/2023 193  150 - 450 K/uL Final    MPV 02/09/2023 9.6  9.2 - 12.9 fL Final    Gran # (ANC) 02/09/2023 4.7  1.8 - 7.7 K/uL Final    Comment: The ANC is based on a white cell differential  from an   automated cell counter. It has not been microscopically   reviewed for the presence of abnormal cells. Clinical   correlation is required.      Immature Grans (Abs) 02/09/2023 0.04  0.00 - 0.04 K/uL Final    Comment: Mild elevation in immature granulocytes is non specific and   can be seen in a variety of conditions including stress response,   acute inflammation, trauma and pregnancy. Correlation with other   laboratory and clinical findings is essential.      Sodium 02/09/2023 141  136 - 145 mmol/L Final    Potassium 02/09/2023 4.1  3.5 - 5.1 mmol/L Final    Chloride 02/09/2023 105  95 - 110 mmol/L Final    CO2 02/09/2023 25  23 - 29 mmol/L Final    Glucose 02/09/2023 93  70 - 110 mg/dL Final    BUN 02/09/2023 9  8 - 23 mg/dL Final    Creatinine 02/09/2023 0.7  0.5 - 1.4 mg/dL Final    Calcium 02/09/2023 9.4  8.7 - 10.5 mg/dL Final    Total Protein 02/09/2023 7.4  6.0 - 8.4 g/dL Final    Albumin 02/09/2023 2.3 (L)  3.5 - 5.2 g/dL Final    Total Bilirubin 02/09/2023 0.5  0.1 - 1.0 mg/dL Final    Comment: For infants and newborns, interpretation of results should be based  on gestational age, weight and in agreement with clinical  observations.    Premature Infant recommended reference ranges:  Up to 24 hours.............<8.0 mg/dL  Up to 48 hours............<12.0 mg/dL  3-5 days..................<15.0 mg/dL  6-29 days.................<15.0 mg/dL      Alkaline Phosphatase 02/09/2023 171 (H)  55 - 135 U/L Final    AST 02/09/2023 30  10 - 40 U/L Final    ALT 02/09/2023 18  10 - 44 U/L Final    Anion Gap 02/09/2023 11  8 - 16 mmol/L Final    eGFR 02/09/2023 >60.0  >60 mL/min/1.73 m^2 Final     Assessment and Plan        1. 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 Immunodeficiency secondary to neoplasm    4. Primary hypertension    5. MARIA A (acute kidney injury)    6. Anemia associated with chemotherapy    7. Pain    8. Drug-induced constipation    9. Weight decrease     10. Severe malnutrition    11. Lower urinary tract symptoms (LUTS)      1-3.  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  Guardant 360 was negative for BRAF, VIRI, HER2 amplification.   Pretreatment CEA 57.  We had a long and sonia discussion with him about his diagnosis. Unfortunately, the disease is not curable but remains treatable and he has good performance status.   Restaging scans after 7 cycles of FOLFOX + Pema showed significant reduction in colonic mass and liver mass.   we switched to maintenance as of cycle 8 with 5FU + Pema  Restaging scans from March 2022 show stable disease.   MRI on 7/18/22 showing hepatic progression of disease in the right hepatic lobe noted on the exam. CT CAP on 8/26 shows some mild progression in both liver metastases.    Discussed case at colorectal tumor board 8/31/22 and had a diagnostic lap performed by Dr. Rodriguez on 9/7 to assess for any occult peritoneal disease. Findings from the diagnostic lap were negative. Fluid from around from around the primary mass was sent for cytology that was negative for malignancy.   Underwent primary tumor resection on 10/20/22 with Dr. Bonilla.    Meanwhile his liver mets had grown.  We discussed his case at [facility] conference with plans for short term Y-90 and then restarting chemotherapy prior to considering any surgical options to his liver metastases.  He underwent Y-90 delivery on 12/30/22. Tolerated well.   CT CAP on 1/23/23 reviewed at [facility] conference with good response to Y-90, no new lesions.  Underwent second Y-90 on 1/27/23. Can consider R hepatectomy pending follow-up imaging after Y-90.     Labs reviewed. Acceptable to proceed with cycle 42 today of FOLFOX.  Because of some worsening neuropathy in feet, will omit oxaliplatin and plan for maintenance 5-FU again starting with cycle 41.     Discussed with Dr. Rodriguez and plan to evaluate for PVE pending further calculations. Will hold pema today given  potential for PVE in near future.   Monitor CEA.     RTC in 2 weeks for next cycle.    4. Hypertension   -- Well controlled.   -- continue with Amlodipine .   -- Following with PCP.     5. MARIA A  -- Improved renal function. Cr normal.   -- Monitor. Encouraged continued hydration.     6. Anemia  -- Stable. Monitor.  -- Asymptomatic.   -- No signs of bleeding. Platelets normal.     7-10. Neoplasm related pain, weight loss, constipation  -- Will continue with pain management at this time as pain control is improved. Denies pain in clinic today.   -- Following with nutrition. Encouraged increased protein. Drinking Boost daily.   -- Continue Miralax daily for constipation.  -- No e/o PE on CT. R flank pain likely related to post Y-90 effect on hepatic mets. Improving.     11. Urinary hesitancy  -- Continue Flomax.  -- Reports improvement since starting medication.     Patient is in agreement with the proposed treatment plan. All questions were answered to the patient's satisfaction. Pt knows to call clinic if anything is needed before the next clinic visit.      Natividad Maher, MSN, APRN, ACCNS-  Hematology and Medical Oncology  Clinical Nurse Specialist to Dr. Schuster, Dr. Quijano & Dr. Lancaster Chart for Scheduling    Med Onc Chart Routing      Follow up with physician 2 weeks. RTC in 2 weeks with labs (CBC,CMP,CEA,UA) to see Dr. Schuster for chemo   Follow up with RACHEL 4 weeks. RTC in 4 weeks with labs (CBC,CMP,CEA,UA) to see RACHEL for chemo   Infusion scheduling note chemo given every 2 weeks, pump d/c on day 3 of each cycle   Injection scheduling note    Labs CBC, CMP, CEA and urinalysis   Lab interval: every 2 weeks     Imaging    Pharmacy appointment    Other referrals        Treatment Plan Information   OP COLORECTAL FOLFOX + BEVACIZUMAB Q2W   Torres Pantoja MD   Upcoming Treatment Dates - OP COLORECTAL FOLFOX + BEVACIZUMAB Q2W    2/10/2023       Chemotherapy       fluorouraciL in sodium chloride 0.9% 100 mL  chemo infusion       Antiemetics       palonosetron (ALOXI) 0.25 mg with Dexamethasone (DECADRON) 12 mg in NS 50 mL IVPB  2/24/2023       Chemotherapy       fluorouraciL in sodium chloride 0.9% 100 mL chemo infusion       Antiemetics       palonosetron (ALOXI) 0.25 mg with Dexamethasone (DECADRON) 12 mg in NS 50 mL IVPB  3/10/2023       Chemotherapy       fluorouraciL in sodium chloride 0.9% 100 mL chemo infusion       Antiemetics       palonosetron (ALOXI) 0.25 mg with Dexamethasone (DECADRON) 12 mg in NS 50 mL IVPB    Therapy Plan Information  IV Fluids  sodium chloride 0.9% bolus 1,000 mL 1,000 mL  1,000 mL, Intravenous, Every visit  Flushes  sodium chloride 0.9% flush 10 mL  10 mL, Intravenous, PRN  heparin, porcine (PF) 100 unit/mL injection flush 500 Units  500 Units, Intravenous, PRN

## 2023-02-09 NOTE — PLAN OF CARE
Problem: Adult Inpatient Plan of Care  Goal: Optimal Comfort and Wellbeing  Intervention: Provide Person-Centered Care  Flowsheets (Taken 2/9/2023 1251)  Trust Relationship/Rapport:   care explained   reassurance provided   thoughts/feelings acknowledged   choices provided   emotional support provided   empathic listening provided   questions answered   questions encouraged

## 2023-02-11 ENCOUNTER — INFUSION (OUTPATIENT)
Dept: INFUSION THERAPY | Facility: HOSPITAL | Age: 73
End: 2023-02-11
Payer: MEDICARE

## 2023-02-11 VITALS
HEART RATE: 100 BPM | TEMPERATURE: 98 F | DIASTOLIC BLOOD PRESSURE: 68 MMHG | OXYGEN SATURATION: 100 % | SYSTOLIC BLOOD PRESSURE: 103 MMHG | RESPIRATION RATE: 18 BRPM

## 2023-02-11 DIAGNOSIS — C78.7 METASTATIC COLON CANCER TO LIVER: Primary | ICD-10-CM

## 2023-02-11 DIAGNOSIS — C18.9 METASTATIC COLON CANCER TO LIVER: Primary | ICD-10-CM

## 2023-02-11 PROCEDURE — 63600175 PHARM REV CODE 636 W HCPCS: Performed by: REGISTERED NURSE

## 2023-02-11 PROCEDURE — 25000003 PHARM REV CODE 250: Performed by: REGISTERED NURSE

## 2023-02-11 PROCEDURE — A4216 STERILE WATER/SALINE, 10 ML: HCPCS | Performed by: REGISTERED NURSE

## 2023-02-11 RX ORDER — DIPHENHYDRAMINE HYDROCHLORIDE 50 MG/ML
25 INJECTION INTRAMUSCULAR; INTRAVENOUS
Status: DISCONTINUED | OUTPATIENT
Start: 2023-02-11 | End: 2023-02-11 | Stop reason: HOSPADM

## 2023-02-11 RX ORDER — SODIUM CHLORIDE 0.9 % (FLUSH) 0.9 %
10 SYRINGE (ML) INJECTION
Status: DISCONTINUED | OUTPATIENT
Start: 2023-02-11 | End: 2023-02-11 | Stop reason: HOSPADM

## 2023-02-11 RX ORDER — HEPARIN 100 UNIT/ML
500 SYRINGE INTRAVENOUS
Status: DISCONTINUED | OUTPATIENT
Start: 2023-02-11 | End: 2023-02-11 | Stop reason: HOSPADM

## 2023-02-11 RX ADMIN — Medication 10 ML: at 09:02

## 2023-02-11 RX ADMIN — HEPARIN SODIUM (PORCINE) LOCK FLUSH IV SOLN 100 UNIT/ML 500 UNITS: 100 SOLUTION at 09:02

## 2023-02-11 NOTE — NURSING
0958 Patient disconnected from home 5FU infusion pump without incident. [place] volume at 0. Port with brisk blood return and flushed without resistance, heparin locked and needle removed at discharge. Vitals stable. Patient aware of his next appointment date/time. To contact provider with questions or concerns. D/C ambulatory and stable with his daughter.

## 2023-02-20 ENCOUNTER — TELEPHONE (OUTPATIENT)
Dept: SURGERY | Facility: CLINIC | Age: 73
End: 2023-02-20
Payer: MEDICARE

## 2023-02-20 NOTE — TELEPHONE ENCOUNTER
Call placed to pt to schedule follow up with Dr. Rodriguez. Pt reports his daughter can assist with scheduling appt but she is unavailable at this time. Pt daughter Carrie will call back. Call back number provided.

## 2023-02-20 NOTE — TELEPHONE ENCOUNTER
Received return call from pt daughter. Follow up appt made per daughter request. Pt and daughter unable to make afternoon appt. Time date and location provided. Pt daughter verbally acknowledged all appt details.

## 2023-02-23 ENCOUNTER — LAB VISIT (OUTPATIENT)
Dept: LAB | Facility: HOSPITAL | Age: 73
End: 2023-02-23
Attending: INTERNAL MEDICINE
Payer: MEDICARE

## 2023-02-23 ENCOUNTER — OFFICE VISIT (OUTPATIENT)
Dept: HEMATOLOGY/ONCOLOGY | Facility: CLINIC | Age: 73
End: 2023-02-23
Payer: MEDICARE

## 2023-02-23 VITALS
BODY MASS INDEX: 17.92 KG/M2 | DIASTOLIC BLOOD PRESSURE: 68 MMHG | HEIGHT: 67 IN | WEIGHT: 114.19 LBS | SYSTOLIC BLOOD PRESSURE: 108 MMHG | OXYGEN SATURATION: 99 % | HEART RATE: 92 BPM | RESPIRATION RATE: 18 BRPM

## 2023-02-23 DIAGNOSIS — C78.7 METASTATIC COLON CANCER TO LIVER: Primary | ICD-10-CM

## 2023-02-23 DIAGNOSIS — N17.9 AKI (ACUTE KIDNEY INJURY): ICD-10-CM

## 2023-02-23 DIAGNOSIS — I10 PRIMARY HYPERTENSION: ICD-10-CM

## 2023-02-23 DIAGNOSIS — Z79.899 IMMUNODEFICIENCY DUE TO CHEMOTHERAPY: ICD-10-CM

## 2023-02-23 DIAGNOSIS — R39.9 LOWER URINARY TRACT SYMPTOMS (LUTS): ICD-10-CM

## 2023-02-23 DIAGNOSIS — K59.03 DRUG-INDUCED CONSTIPATION: ICD-10-CM

## 2023-02-23 DIAGNOSIS — R52 PAIN: ICD-10-CM

## 2023-02-23 DIAGNOSIS — D64.81 ANEMIA ASSOCIATED WITH CHEMOTHERAPY: ICD-10-CM

## 2023-02-23 DIAGNOSIS — C18.9 METASTATIC COLON CANCER TO LIVER: ICD-10-CM

## 2023-02-23 DIAGNOSIS — E43 SEVERE MALNUTRITION: ICD-10-CM

## 2023-02-23 DIAGNOSIS — T45.1X5A IMMUNODEFICIENCY DUE TO CHEMOTHERAPY: ICD-10-CM

## 2023-02-23 DIAGNOSIS — D49.9 IMMUNODEFICIENCY SECONDARY TO NEOPLASM: ICD-10-CM

## 2023-02-23 DIAGNOSIS — C78.7 METASTATIC COLON CANCER TO LIVER: ICD-10-CM

## 2023-02-23 DIAGNOSIS — D84.821 IMMUNODEFICIENCY DUE TO CHEMOTHERAPY: ICD-10-CM

## 2023-02-23 DIAGNOSIS — T45.1X5A ANEMIA ASSOCIATED WITH CHEMOTHERAPY: ICD-10-CM

## 2023-02-23 DIAGNOSIS — C18.9 METASTATIC COLON CANCER TO LIVER: Primary | ICD-10-CM

## 2023-02-23 DIAGNOSIS — D84.81 IMMUNODEFICIENCY SECONDARY TO NEOPLASM: ICD-10-CM

## 2023-02-23 DIAGNOSIS — R63.4 WEIGHT DECREASE: ICD-10-CM

## 2023-02-23 LAB
ALBUMIN SERPL BCP-MCNC: 2.7 G/DL (ref 3.5–5.2)
ALP SERPL-CCNC: 180 U/L (ref 55–135)
ALT SERPL W/O P-5'-P-CCNC: 16 U/L (ref 10–44)
ANION GAP SERPL CALC-SCNC: 10 MMOL/L (ref 8–16)
AST SERPL-CCNC: 32 U/L (ref 10–40)
BASOPHILS # BLD AUTO: 0.05 K/UL (ref 0–0.2)
BASOPHILS NFR BLD: 0.7 % (ref 0–1.9)
BILIRUB SERPL-MCNC: 0.7 MG/DL (ref 0.1–1)
BUN SERPL-MCNC: 12 MG/DL (ref 8–23)
CALCIUM SERPL-MCNC: 9.5 MG/DL (ref 8.7–10.5)
CEA SERPL-MCNC: 13.8 NG/ML (ref 0–5)
CHLORIDE SERPL-SCNC: 105 MMOL/L (ref 95–110)
CO2 SERPL-SCNC: 25 MMOL/L (ref 23–29)
CREAT SERPL-MCNC: 0.9 MG/DL (ref 0.5–1.4)
DIFFERENTIAL METHOD: ABNORMAL
EOSINOPHIL # BLD AUTO: 0.3 K/UL (ref 0–0.5)
EOSINOPHIL NFR BLD: 4.7 % (ref 0–8)
ERYTHROCYTE [DISTWIDTH] IN BLOOD BY AUTOMATED COUNT: 17.2 % (ref 11.5–14.5)
EST. GFR  (NO RACE VARIABLE): >60 ML/MIN/1.73 M^2
GLUCOSE SERPL-MCNC: 100 MG/DL (ref 70–110)
HCT VFR BLD AUTO: 32.1 % (ref 40–54)
HGB BLD-MCNC: 9.4 G/DL (ref 14–18)
IMM GRANULOCYTES # BLD AUTO: 0.02 K/UL (ref 0–0.04)
IMM GRANULOCYTES NFR BLD AUTO: 0.3 % (ref 0–0.5)
LYMPHOCYTES # BLD AUTO: 0.6 K/UL (ref 1–4.8)
LYMPHOCYTES NFR BLD: 8.8 % (ref 18–48)
MCH RBC QN AUTO: 25.8 PG (ref 27–31)
MCHC RBC AUTO-ENTMCNC: 29.3 G/DL (ref 32–36)
MCV RBC AUTO: 88 FL (ref 82–98)
MONOCYTES # BLD AUTO: 1.2 K/UL (ref 0.3–1)
MONOCYTES NFR BLD: 17.6 % (ref 4–15)
NEUTROPHILS # BLD AUTO: 4.8 K/UL (ref 1.8–7.7)
NEUTROPHILS NFR BLD: 67.9 % (ref 38–73)
NRBC BLD-RTO: 0 /100 WBC
PLATELET # BLD AUTO: 225 K/UL (ref 150–450)
PMV BLD AUTO: 9.6 FL (ref 9.2–12.9)
POTASSIUM SERPL-SCNC: 4.2 MMOL/L (ref 3.5–5.1)
PROT SERPL-MCNC: 7.5 G/DL (ref 6–8.4)
RBC # BLD AUTO: 3.64 M/UL (ref 4.6–6.2)
SODIUM SERPL-SCNC: 140 MMOL/L (ref 136–145)
WBC # BLD AUTO: 7.06 K/UL (ref 3.9–12.7)

## 2023-02-23 PROCEDURE — 3078F DIAST BP <80 MM HG: CPT | Mod: CPTII,S$GLB,, | Performed by: INTERNAL MEDICINE

## 2023-02-23 PROCEDURE — 1126F PR PAIN SEVERITY QUANTIFIED, NO PAIN PRESENT: ICD-10-PCS | Mod: CPTII,S$GLB,, | Performed by: INTERNAL MEDICINE

## 2023-02-23 PROCEDURE — 82378 CARCINOEMBRYONIC ANTIGEN: CPT | Performed by: INTERNAL MEDICINE

## 2023-02-23 PROCEDURE — 1159F PR MEDICATION LIST DOCUMENTED IN MEDICAL RECORD: ICD-10-PCS | Mod: CPTII,S$GLB,, | Performed by: INTERNAL MEDICINE

## 2023-02-23 PROCEDURE — 3008F PR BODY MASS INDEX (BMI) DOCUMENTED: ICD-10-PCS | Mod: CPTII,S$GLB,, | Performed by: INTERNAL MEDICINE

## 2023-02-23 PROCEDURE — 3074F PR MOST RECENT SYSTOLIC BLOOD PRESSURE < 130 MM HG: ICD-10-PCS | Mod: CPTII,S$GLB,, | Performed by: INTERNAL MEDICINE

## 2023-02-23 PROCEDURE — 1126F AMNT PAIN NOTED NONE PRSNT: CPT | Mod: CPTII,S$GLB,, | Performed by: INTERNAL MEDICINE

## 2023-02-23 PROCEDURE — 99999 PR PBB SHADOW E&M-EST. PATIENT-LVL III: ICD-10-PCS | Mod: PBBFAC,,, | Performed by: INTERNAL MEDICINE

## 2023-02-23 PROCEDURE — 1101F PR PT FALLS ASSESS DOC 0-1 FALLS W/OUT INJ PAST YR: ICD-10-PCS | Mod: CPTII,S$GLB,, | Performed by: INTERNAL MEDICINE

## 2023-02-23 PROCEDURE — 1159F MED LIST DOCD IN RCRD: CPT | Mod: CPTII,S$GLB,, | Performed by: INTERNAL MEDICINE

## 2023-02-23 PROCEDURE — 80053 COMPREHEN METABOLIC PANEL: CPT | Performed by: INTERNAL MEDICINE

## 2023-02-23 PROCEDURE — 36415 COLL VENOUS BLD VENIPUNCTURE: CPT | Performed by: INTERNAL MEDICINE

## 2023-02-23 PROCEDURE — 99999 PR PBB SHADOW E&M-EST. PATIENT-LVL III: CPT | Mod: PBBFAC,,, | Performed by: INTERNAL MEDICINE

## 2023-02-23 PROCEDURE — 85025 COMPLETE CBC W/AUTO DIFF WBC: CPT | Performed by: INTERNAL MEDICINE

## 2023-02-23 PROCEDURE — 99214 OFFICE O/P EST MOD 30 MIN: CPT | Mod: S$GLB,,, | Performed by: INTERNAL MEDICINE

## 2023-02-23 PROCEDURE — 3288F FALL RISK ASSESSMENT DOCD: CPT | Mod: CPTII,S$GLB,, | Performed by: INTERNAL MEDICINE

## 2023-02-23 PROCEDURE — 99214 PR OFFICE/OUTPT VISIT, EST, LEVL IV, 30-39 MIN: ICD-10-PCS | Mod: S$GLB,,, | Performed by: INTERNAL MEDICINE

## 2023-02-23 PROCEDURE — 3074F SYST BP LT 130 MM HG: CPT | Mod: CPTII,S$GLB,, | Performed by: INTERNAL MEDICINE

## 2023-02-23 PROCEDURE — 1101F PT FALLS ASSESS-DOCD LE1/YR: CPT | Mod: CPTII,S$GLB,, | Performed by: INTERNAL MEDICINE

## 2023-02-23 PROCEDURE — 3078F PR MOST RECENT DIASTOLIC BLOOD PRESSURE < 80 MM HG: ICD-10-PCS | Mod: CPTII,S$GLB,, | Performed by: INTERNAL MEDICINE

## 2023-02-23 PROCEDURE — 3288F PR FALLS RISK ASSESSMENT DOCUMENTED: ICD-10-PCS | Mod: CPTII,S$GLB,, | Performed by: INTERNAL MEDICINE

## 2023-02-23 PROCEDURE — 3008F BODY MASS INDEX DOCD: CPT | Mod: CPTII,S$GLB,, | Performed by: INTERNAL MEDICINE

## 2023-02-23 NOTE — PROGRESS NOTES
"[facility]  [facility]  Hematology/Medical Oncology Clinic     PATIENT: Javier Downs  MRN: [id number]  DATE: 2/23/2023    Diagnosis: Transverse colon metastatic cancer to the liver     Oncological history:   04/20/2021: metastatic colon cancer to the liver, moderately differentiated, pMMR  05/06/2021: C1 mFOLFOX + Pema  05/20/2021: C2 mFOLFOX + Peam  06/03/2021: C3 mFOLFOX + Pema   06/17/2021: C4 mFOLFOX + Pema  07/01/2021: C5 mFOLFOX + Pema  07/15/2021: C6 mFOLFOX + Pema  Restaging CT CAP: significant improvement of lesions   07/29/2021: C7 mFOLFOX + Pema   08/12/2021: Switched to maintenance  5FU+Pema (C8)  06/23/2022: C30 maintenance 5FU+Pema  10/20/2022: R hemicolectomy with Dr. Bonilla  12/30/2022: Y-90 to R liver  1/27/2023: Y-90 to R liver    Oncological History copied from medical chart:   Mr. Downs is a 72 y.o. male   70 years old pleasant AA gentleman, with history of hypertension, presenting with two weeks duration of abdominal cramps, diarrhea, nausea and vomiting. His symptoms started gradually with intermittent generalize crampy abdominal pain, worse with food. That was associated with nausea, reflux and occaisonal vomiting. He tried pepto-bismol and imodium with no relief, and hence he presented to ER.   He lost significant amount of weight, but cannot quantify the amount or the time period. He has also been feeling weaker than usual.   He hasn't seen a healthcare provider in over than 10 years. He has never had a colonoscopy.   In the ER. His labs were significant of microcytic anemia of 9.7 g/dl, MCV 77, and Platelets counts of 495. CT of the abdomen and pelvis showed " Large mass in the transverse colon highly suspicious for adenocarcinoma resulting in at least high-grade partial obstruction with dilation of proximal colon and small bowel. Soft tissue nodules in the adjacent mesentery are suspicious for pily metastases and/or mesenteric implants.  Numerous hepatic masses " "measuring up to 11.2 cm most likely related to metastatic disease.  There also several small subcapsular lesions which could reflect additional metastatic disease"   CT chest was negative to metastatic disease.   He underwent colonoscopy and stent placement. 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 HER 2 negative, VIRI/SHERI NGS was cancelled due to insufficient quantity   Guardant 360 was sent, negative for KRAS, NRAS, BRAF     He started palliative chemotherapy with FOLFOX+Pema     Interval History:   He presents today with his daughter for follow-up prior to cycle 43 of FOLFOX. He is feeling better since he is more removed from his Y-90.  No pain today.  Bowel movements are normal.  Eating well.  Weight remains stable.  Stable paresthesias in feet, no neuropathy in hands.  No other complaints today.      Presents with his daughter. ECOG status is 1.       Past Medical History:   Past Medical History:   Diagnosis Date    MARIA A (acute kidney injury) 8/18/2022    Hypertension     Metastatic colon cancer to liver 4/22/2021       Past Surgical HIstory:   Past Surgical History:   Procedure Laterality Date    COLONOSCOPY N/A 4/20/2021    Procedure: COLONOSCOPY with possible stent;  Surgeon: EDILMA Bonilla MD;  Location: [facility] ([address] [place] [address]);  Service: Colon and Rectal;  Laterality: N/A;    DIAGNOSTIC LAPAROSCOPY N/A 9/7/2022    Procedure: LAPAROSCOPY, DIAGNOSTIC;  Surgeon: Adrian Rodriguez MD;  Location: [facility] OR [address] [place] [address];  Service: General;  Laterality: N/A;    INSERTION OF TUNNELED CENTRAL VENOUS CATHETER (CVC) WITH SUBCUTANEOUS PORT N/A 5/3/2021    Procedure: FASOOIZRO-NOQH-H-CATH;  Surgeon: Francisco Layton MD;  Location: [facility] OR [address] [place] [address];  Service: Vascular;  Laterality: N/A;    OMENTECTOMY N/A 10/20/2022    Procedure: OMENTECTOMY;  Surgeon: EDILMA Bonilla MD;  " Location: [facility] OR 2ND FLR;  Service: Colon and Rectal;  Laterality: N/A;    RIGHT HEMICOLECTOMY N/A 10/20/2022    Procedure: HEMICOLECTOMY, RIGHT, extended;  Surgeon: EDILMA Bonilla MD;  Location: [facility] OR 2ND FLR;  Service: Colon and Rectal;  Laterality: N/A;  Extended right hemicolectomy CONSENT IN AM       Family History: No family history on file.    Social History:  reports that he has never smoked. He has never used smokeless tobacco. He reports that he does not currently use alcohol. He reports that he does not use drugs.    Allergies:  Review of patient's allergies indicates:  No Known Allergies    Medications:  Current Outpatient Medications   Medication Sig Dispense Refill    acetaminophen (TYLENOL) 500 MG tablet Take 1 tablet (500 mg total) by mouth every 6 (six) hours as needed for Pain (alternate with ibuprofen).  0    amLODIPine (NORVASC) 10 MG tablet Take 1 tablet (10 mg total) by mouth once daily. 90 tablet 3    ibuprofen (ADVIL,MOTRIN) 400 MG tablet Take 1 tablet (400 mg total) by mouth every 6 (six) hours as needed for Other (pain). Alternate with tylenol      methocarbamoL (ROBAXIN) 500 MG Tab Take 1 tablet (500 mg total) by mouth 3 (three) times daily. 90 tablet 0    ondansetron (ZOFRAN) 4 MG tablet Take 1 tablet (4 mg total) by mouth every 8 (eight) hours as needed for Nausea. 30 tablet 3    oxyCODONE (ROXICODONE) 5 MG immediate release tablet Take 1 tablet (5 mg total) by mouth every 6 (six) hours as needed for Pain. 20 tablet 0    tamsulosin (FLOMAX) 0.4 mg Cap Take 1 capsule (0.4 mg total) by mouth once daily. 30 capsule 5     No current facility-administered medications for this visit.       Review of Systems   Constitutional:  Positive for fatigue. Negative for activity change, appetite change, chills, diaphoresis, fever and unexpected weight change.   HENT:  Negative for congestion, mouth sores, nosebleeds, postnasal drip, rhinorrhea, trouble swallowing and voice change.    Eyes:   "Negative for pain and visual disturbance.   Respiratory:  Negative for cough, chest tightness, shortness of breath and wheezing.    Cardiovascular:  Negative for chest pain, palpitations and leg swelling.   Gastrointestinal:  Negative for abdominal distention, abdominal pain, blood in stool, constipation, diarrhea, nausea and vomiting.   Genitourinary:  Negative for difficulty urinating, dysuria, flank pain, frequency, hematuria and urgency.   Musculoskeletal:  Negative for arthralgias, back pain and myalgias.   Skin:  Negative for rash and wound.   Neurological:  Positive for numbness. Negative for dizziness, facial asymmetry, weakness, light-headedness and headaches.   Psychiatric/Behavioral:  Negative for agitation, behavioral problems, confusion, decreased concentration, dysphoric mood and sleep disturbance. The patient is not nervous/anxious.    All other systems reviewed and are negative.    ECOG Performance Status: 1     Objective:      Vitals:   Vitals:    02/23/23 0842   BP: 108/68   BP Location: Left arm   Patient Position: Sitting   BP Method: Small (Automatic)   Pulse: 92   Resp: 18   SpO2: 99%   Weight: 51.8 kg (114 lb 3.2 oz)   Height: 5' 7" (1.702 m)         Physical Exam  Vitals reviewed.   Constitutional:       General: He is not in acute distress.     Appearance: Normal appearance. He is not ill-appearing, toxic-appearing or diaphoretic.   HENT:      Head: Normocephalic and atraumatic.      Right Ear: External ear normal.      Left Ear: External ear normal.   Eyes:      General: No scleral icterus.     Extraocular Movements: Extraocular movements intact.      Conjunctiva/sclera: Conjunctivae normal.      Pupils: Pupils are equal, round, and reactive to light.   Cardiovascular:      Rate and Rhythm: Normal rate and regular rhythm.      Pulses: Normal pulses.      Heart sounds: Normal heart sounds. No murmur heard.  Pulmonary:      Effort: Pulmonary effort is normal. No respiratory distress.      " Breath sounds: Normal breath sounds. No wheezing.   Chest:      Comments: Port to RCW, no signs of infection.  Abdominal:      General: Abdomen is flat. Bowel sounds are normal. There is no distension.      Palpations: Abdomen is soft. There is no mass.      Tenderness: There is no abdominal tenderness.      Comments: Vertical midline abdominal wound well healed   Musculoskeletal:         General: No swelling or deformity. Normal range of motion.      Right lower leg: No edema.      Left lower leg: No edema.   Skin:     Coloration: Skin is not jaundiced or pale.      Findings: No bruising, erythema or rash.   Neurological:      General: No focal deficit present.      Mental Status: He is alert and oriented to person, place, and time. Mental status is at baseline.      Cranial Nerves: No cranial nerve deficit.      Sensory: No sensory deficit.      Gait: Gait normal.   Psychiatric:         Mood and Affect: Mood normal.         Behavior: Behavior normal.         Thought Content: Thought content normal.         Judgment: Judgment normal.     Laboratory Data:  Lab Visit on 02/23/2023   Component Date Value Ref Range Status    WBC 02/23/2023 7.06  3.90 - 12.70 K/uL Final    RBC 02/23/2023 3.64 (L)  4.60 - 6.20 M/uL Final    Hemoglobin 02/23/2023 9.4 (L)  14.0 - 18.0 g/dL Final    Hematocrit 02/23/2023 32.1 (L)  40.0 - 54.0 % Final    MCV 02/23/2023 88  82 - 98 fL Final    MCH 02/23/2023 25.8 (L)  27.0 - 31.0 pg Final    MCHC 02/23/2023 29.3 (L)  32.0 - 36.0 g/dL Final    RDW 02/23/2023 17.2 (H)  11.5 - 14.5 % Final    Platelets 02/23/2023 225  150 - 450 K/uL Final    MPV 02/23/2023 9.6  9.2 - 12.9 fL Final    Immature Granulocytes 02/23/2023 0.3  0.0 - 0.5 % Final    Gran # (ANC) 02/23/2023 4.8  1.8 - 7.7 K/uL Final    Immature Grans (Abs) 02/23/2023 0.02  0.00 - 0.04 K/uL Final    Comment: Mild elevation in immature granulocytes is non specific and   can be seen in a variety of conditions including stress response,    acute inflammation, trauma and pregnancy. Correlation with other   laboratory and clinical findings is essential.      Lymph # 02/23/2023 0.6 (L)  1.0 - 4.8 K/uL Final    Mono # 02/23/2023 1.2 (H)  0.3 - 1.0 K/uL Final    Eos # 02/23/2023 0.3  0.0 - 0.5 K/uL Final    Baso # 02/23/2023 0.05  0.00 - 0.20 K/uL Final    nRBC 02/23/2023 0  0 /100 WBC Final    Gran % 02/23/2023 67.9  38.0 - 73.0 % Final    Lymph % 02/23/2023 8.8 (L)  18.0 - 48.0 % Final    Mono % 02/23/2023 17.6 (H)  4.0 - 15.0 % Final    Eosinophil % 02/23/2023 4.7  0.0 - 8.0 % Final    Basophil % 02/23/2023 0.7  0.0 - 1.9 % Final    Differential Method 02/23/2023 Automated   Final    Sodium 02/23/2023 140  136 - 145 mmol/L Final    Potassium 02/23/2023 4.2  3.5 - 5.1 mmol/L Final    Chloride 02/23/2023 105  95 - 110 mmol/L Final    CO2 02/23/2023 25  23 - 29 mmol/L Final    Glucose 02/23/2023 100  70 - 110 mg/dL Final    BUN 02/23/2023 12  8 - 23 mg/dL Final    Creatinine 02/23/2023 0.9  0.5 - 1.4 mg/dL Final    Calcium 02/23/2023 9.5  8.7 - 10.5 mg/dL Final    Total Protein 02/23/2023 7.5  6.0 - 8.4 g/dL Final    Albumin 02/23/2023 2.7 (L)  3.5 - 5.2 g/dL Final    Total Bilirubin 02/23/2023 0.7  0.1 - 1.0 mg/dL Final    Comment: For infants and newborns, interpretation of results should be based  on gestational age, weight and in agreement with clinical  observations.    Premature Infant recommended reference ranges:  Up to 24 hours.............<8.0 mg/dL  Up to 48 hours............<12.0 mg/dL  3-5 days..................<15.0 mg/dL  6-29 days.................<15.0 mg/dL      Alkaline Phosphatase 02/23/2023 180 (H)  55 - 135 U/L Final    AST 02/23/2023 32  10 - 40 U/L Final    ALT 02/23/2023 16  10 - 44 U/L Final    Anion Gap 02/23/2023 10  8 - 16 mmol/L Final    eGFR 02/23/2023 >60.0  >60 mL/min/1.73 m^2 Final    CEA 02/23/2023 13.8 (H)  0.0 - 5.0 ng/mL Final    Comment: CEA Normal Range:  Non-Smokers: 0-3.0 ng/mL  Smokers:     0-5.0 ng/mL    The  testing method is a chemiluminescent microparticle immunoassay   manufactured by [employer] and performed on the    or   [employer] system. Values obtained with different assay manufacturers   for   methods may be different and cannot be used interchangeably.       Assessment and Plan        1. 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 Immunodeficiency secondary to neoplasm    4. Primary hypertension    5. MARIA A (acute kidney injury)    6. Anemia associated with chemotherapy    7. Pain    8. Drug-induced constipation    9. Weight decrease    10. Severe malnutrition    11. Lower urinary tract symptoms (LUTS)      1-3.  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  Guardant 360 was negative for BRAF, VIRI, HER2 amplification.   Pretreatment CEA 57.  We had a long and sonia discussion with him about his diagnosis. Unfortunately, the disease is not curable but remains treatable and he has good performance status.   Restaging scans after 7 cycles of FOLFOX + Pema showed significant reduction in colonic mass and liver mass.   we switched to maintenance as of cycle 8 with 5FU + Pema  Restaging scans from March 2022 show stable disease.   MRI on 7/18/22 showing hepatic progression of disease in the right hepatic lobe noted on the exam. CT CAP on 8/26 shows some mild progression in both liver metastases.    Discussed case at colorectal tumor board 8/31/22 and had a diagnostic lap performed by Dr. Rodriguez on 9/7 to assess for any occult peritoneal disease. Findings from the diagnostic lap were negative. Fluid from around from around the primary mass was sent for cytology that was negative for malignancy.   Underwent primary tumor resection on 10/20/22 with Dr. Bonilla.    Meanwhile his liver mets had grown.  We discussed his case at [facility] conference with plans for short term Y-90 and then restarting chemotherapy prior to considering any  surgical options to his liver metastases.  He underwent Y-90 delivery on 12/30/22. Tolerated well.   CT CAP on 1/23/23 reviewed at CRC conference with good response to Y-90, no new lesions.  Underwent second Y-90 on 1/27/23. Can consider R hepatectomy pending follow-up imaging after Y-90.     Received cycle 42 of FOLFOX (omitted oxaliplatin again starting cycle 41 due to neuropathy) on 2/9/23.  Because of 5-FU shortage nationally, will hold chemotherapy today until he has MRI and surgery follow-up.  If he needs to restart chemotherapy (I.e. no plans for surgical resection), will place him on capecitabine maintenance for duration of 5-FU shortage.    Discussed with Dr. Rodriguez and plan to evaluate for PVE pending further calculations. Will hold dwight given potential for PVE in near future.   Monitor CEA.     RTC in 2 weeks to discuss next steps.  Cancel upcoming chemo.    4. Hypertension   -- Well controlled.   -- continue with Amlodipine .   -- Following with PCP.     5. MARIA A  -- Improved renal function. Cr normal.   -- Monitor. Encouraged continued hydration.     6. Anemia  -- Stable. Monitor.  -- Asymptomatic.   -- No signs of bleeding. Platelets normal.     7-10. Neoplasm related pain, weight loss, constipation  -- Will continue with pain management at this time as pain control is improved. Denies pain in clinic today.   -- Following with nutrition. Encouraged increased protein. Drinking Boost daily.   Weight improved.  -- Continue Miralax daily for constipation.    11. Urinary hesitancy  -- Continue Flomax.  -- Reports improvement since starting medication.     Patient is in agreement with the proposed treatment plan. All questions were answered to the patient's satisfaction. Pt knows to call clinic if anything is needed before the next clinic visit.    Bunny Schuster MD  Hematology/Oncology  [facility]        Route Chart for Scheduling    Med Onc Chart Routing      Follow up  with physician    Follow up with RACHEL . F/u in 2 weeks as scheduled; can cancel chemo for that day though   Infusion scheduling note    Injection scheduling note    Labs    Imaging    Pharmacy appointment    Other referrals        Treatment Plan Information   OP COLORECTAL FOLFOX + BEVACIZUMAB Q2W   Torres Pantoja MD   Upcoming Treatment Dates - OP COLORECTAL FOLFOX + BEVACIZUMAB Q2W    2/24/2023       Chemotherapy       fluorouraciL in sodium chloride 0.9% 100 mL chemo infusion       Antiemetics       palonosetron (ALOXI) 0.25 mg with Dexamethasone (DECADRON) 12 mg in NS 50 mL IVPB  3/10/2023       Chemotherapy       fluorouraciL in sodium chloride 0.9% 100 mL chemo infusion       Antiemetics       palonosetron (ALOXI) 0.25 mg with Dexamethasone (DECADRON) 12 mg in NS 50 mL IVPB    Therapy Plan Information  IV Fluids  sodium chloride 0.9% bolus 1,000 mL 1,000 mL  1,000 mL, Intravenous, Every visit  Flushes  sodium chloride 0.9% flush 10 mL  10 mL, Intravenous, PRN  heparin, porcine (PF) 100 unit/mL injection flush 500 Units  500 Units, Intravenous, PRN

## 2023-02-25 NOTE — PROGRESS NOTES
HPI:  Javier Downs is a 72 y.o. male with history of hepatic flexure cancer, metastatic to liver    10-  right colectomy, omentectomy    3-  Interval hx  Since surgery patient has 3 Y90 treatments with IR. Feels well overall. Endorses good appetite. Denies nausea/emesis. Denies constipation/diarrhea. Denies blood per rectum. Feels as if he is gaining weight.       Past Medical History:   Diagnosis Date    MARIA A (acute kidney injury) 8/18/2022    Hypertension     Metastatic colon cancer to liver 4/22/2021        Past Surgical History:   Procedure Laterality Date    COLONOSCOPY N/A 4/20/2021    Procedure: COLONOSCOPY with possible stent;  Surgeon: EDILMA Bonilla MD;  Location: [facility] ENDO (2ND FLR);  Service: Colon and Rectal;  Laterality: N/A;    DIAGNOSTIC LAPAROSCOPY N/A 9/7/2022    Procedure: LAPAROSCOPY, DIAGNOSTIC;  Surgeon: Adrian Rodriguez MD;  Location: [facility] OR 2ND FLR;  Service: General;  Laterality: N/A;    INSERTION OF TUNNELED CENTRAL VENOUS CATHETER (CVC) WITH SUBCUTANEOUS PORT N/A 5/3/2021    Procedure: [id number];  Surgeon: Francisco Layton MD;  Location: [facility] OR 2ND FLR;  Service: Vascular;  Laterality: N/A;    OMENTECTOMY N/A 10/20/2022    Procedure: OMENTECTOMY;  Surgeon: EDILMA Bonilla MD;  Location: [facility] OR 2ND FLR;  Service: Colon and Rectal;  Laterality: N/A;    RIGHT HEMICOLECTOMY N/A 10/20/2022    Procedure: HEMICOLECTOMY, RIGHT, extended;  Surgeon: EDILMA Bonilla MD;  Location: [facility] OR 2ND FLR;  Service: Colon and Rectal;  Laterality: N/A;  Extended right hemicolectomy CONSENT IN AM       Review of patient's allergies indicates:  No Known Allergies    No family history on file.    Social History     Socioeconomic History    Marital status:    Tobacco Use    Smoking status: Never    Smokeless tobacco: Never   Substance and Sexual Activity    Alcohol use: Not Currently    Drug use: Never    Sexual activity: Not Currently     Partners: Female  "      ROS:  GENERAL: No fever, chills, fatigability or weight loss.  Integument: No rashes, redness, icterus  CHEST: Denies VARGAS, cyanosis, wheezing, cough and sputum production.  CARDIOVASCULAR: Denies chest pain, PND, orthopnea or reduced exercise tolerance.  GI: Denies abd pain, dysphagia, nausea, vomiting, no hematemesis   : Denies burning on urination, no hematuria, no bacteriuria  MSK: No deformities, swelling, joint pain swelling  Neurologic: No HAs, seizures, weakness, paresthesias, gait problems    PE:  General appearance: in NAD  BP (!) 98/58 (BP Location: Left arm, Patient Position: Sitting)   Pulse (!) 111   Resp 19   Ht 5' 7" (1.702 m)   Wt 51.4 kg (113 lb 3.3 oz)   SpO2 99%   BMI 17.73 kg/m²     LN nonpalpable  AT NC EOMI  Neck supple trachea midline   Chest symmetric, nl excursion, no retractions, breathing comfortably  Abdomen  ND soft NT. Well healed midline scar  EXT - no CCE  Neuro:  Mood/ affect nl, alert and oriented x 3, moves all ext's, gait nl        Assessment:  Doing well following extended right colectomy  Wound healed  Liver metastases     Plan:  - Scheduled for MRI abd 3/4, to follow up with Dr. Rodriguez to discuss further options for liver mass  - Plan to RTC in 6 months    I have personally obtained a history and performed a physical exam with and independent to my resident and discussed the findings and plan with the patient.  I agree with the above findings and plan with the following exceptions:  None    H, Jared Bonilla MD, FACS, FASCRS  Staff Surgeon  Dept of Colon and Rectal Surgery  [facility] [place], [place]      "

## 2023-03-01 ENCOUNTER — OFFICE VISIT (OUTPATIENT)
Dept: SURGERY | Facility: CLINIC | Age: 73
End: 2023-03-01
Payer: MEDICARE

## 2023-03-01 VITALS
WEIGHT: 113.19 LBS | RESPIRATION RATE: 19 BRPM | DIASTOLIC BLOOD PRESSURE: 58 MMHG | SYSTOLIC BLOOD PRESSURE: 98 MMHG | OXYGEN SATURATION: 99 % | BODY MASS INDEX: 17.76 KG/M2 | HEART RATE: 111 BPM | HEIGHT: 67 IN

## 2023-03-01 DIAGNOSIS — Z08 ENCOUNTER FOR FOLLOW-UP SURVEILLANCE OF COLON CANCER: Primary | ICD-10-CM

## 2023-03-01 DIAGNOSIS — Z85.038 ENCOUNTER FOR FOLLOW-UP SURVEILLANCE OF COLON CANCER: Primary | ICD-10-CM

## 2023-03-01 PROCEDURE — 3074F PR MOST RECENT SYSTOLIC BLOOD PRESSURE < 130 MM HG: ICD-10-PCS | Mod: CPTII,S$GLB,, | Performed by: COLON & RECTAL SURGERY

## 2023-03-01 PROCEDURE — 3288F PR FALLS RISK ASSESSMENT DOCUMENTED: ICD-10-PCS | Mod: CPTII,S$GLB,, | Performed by: COLON & RECTAL SURGERY

## 2023-03-01 PROCEDURE — 1101F PR PT FALLS ASSESS DOC 0-1 FALLS W/OUT INJ PAST YR: ICD-10-PCS | Mod: CPTII,S$GLB,, | Performed by: COLON & RECTAL SURGERY

## 2023-03-01 PROCEDURE — 3078F DIAST BP <80 MM HG: CPT | Mod: CPTII,S$GLB,, | Performed by: COLON & RECTAL SURGERY

## 2023-03-01 PROCEDURE — 99214 PR OFFICE/OUTPT VISIT, EST, LEVL IV, 30-39 MIN: ICD-10-PCS | Mod: S$GLB,,, | Performed by: COLON & RECTAL SURGERY

## 2023-03-01 PROCEDURE — 3078F PR MOST RECENT DIASTOLIC BLOOD PRESSURE < 80 MM HG: ICD-10-PCS | Mod: CPTII,S$GLB,, | Performed by: COLON & RECTAL SURGERY

## 2023-03-01 PROCEDURE — 1159F MED LIST DOCD IN RCRD: CPT | Mod: CPTII,S$GLB,, | Performed by: COLON & RECTAL SURGERY

## 2023-03-01 PROCEDURE — 3008F PR BODY MASS INDEX (BMI) DOCUMENTED: ICD-10-PCS | Mod: CPTII,S$GLB,, | Performed by: COLON & RECTAL SURGERY

## 2023-03-01 PROCEDURE — 99999 PR PBB SHADOW E&M-EST. PATIENT-LVL III: ICD-10-PCS | Mod: PBBFAC,,, | Performed by: COLON & RECTAL SURGERY

## 2023-03-01 PROCEDURE — 3288F FALL RISK ASSESSMENT DOCD: CPT | Mod: CPTII,S$GLB,, | Performed by: COLON & RECTAL SURGERY

## 2023-03-01 PROCEDURE — 99999 PR PBB SHADOW E&M-EST. PATIENT-LVL III: CPT | Mod: PBBFAC,,, | Performed by: COLON & RECTAL SURGERY

## 2023-03-01 PROCEDURE — 3008F BODY MASS INDEX DOCD: CPT | Mod: CPTII,S$GLB,, | Performed by: COLON & RECTAL SURGERY

## 2023-03-01 PROCEDURE — 1126F PR PAIN SEVERITY QUANTIFIED, NO PAIN PRESENT: ICD-10-PCS | Mod: CPTII,S$GLB,, | Performed by: COLON & RECTAL SURGERY

## 2023-03-01 PROCEDURE — 1101F PT FALLS ASSESS-DOCD LE1/YR: CPT | Mod: CPTII,S$GLB,, | Performed by: COLON & RECTAL SURGERY

## 2023-03-01 PROCEDURE — 1126F AMNT PAIN NOTED NONE PRSNT: CPT | Mod: CPTII,S$GLB,, | Performed by: COLON & RECTAL SURGERY

## 2023-03-01 PROCEDURE — 1159F PR MEDICATION LIST DOCUMENTED IN MEDICAL RECORD: ICD-10-PCS | Mod: CPTII,S$GLB,, | Performed by: COLON & RECTAL SURGERY

## 2023-03-01 PROCEDURE — 3074F SYST BP LT 130 MM HG: CPT | Mod: CPTII,S$GLB,, | Performed by: COLON & RECTAL SURGERY

## 2023-03-01 PROCEDURE — 99214 OFFICE O/P EST MOD 30 MIN: CPT | Mod: S$GLB,,, | Performed by: COLON & RECTAL SURGERY

## 2023-03-04 ENCOUNTER — HOSPITAL ENCOUNTER (OUTPATIENT)
Dept: RADIOLOGY | Facility: HOSPITAL | Age: 73
Discharge: HOME | End: 2023-03-04
Attending: FAMILY MEDICINE
Payer: MEDICARE

## 2023-03-04 DIAGNOSIS — C78.7 COLON CANCER METASTASIZED TO LIVER: ICD-10-CM

## 2023-03-04 DIAGNOSIS — C18.9 COLON CANCER METASTASIZED TO LIVER: ICD-10-CM

## 2023-03-04 PROCEDURE — 74183 MRI ABD W/O CNTR FLWD CNTR: CPT | Mod: 26,,, | Performed by: STUDENT IN AN ORGANIZED HEALTH CARE EDUCATION/TRAINING PROGRAM

## 2023-03-04 PROCEDURE — 25500020 PHARM REV CODE 255: Performed by: FAMILY MEDICINE

## 2023-03-04 PROCEDURE — 74183 MRI ABD W/O CNTR FLWD CNTR: CPT | Mod: TC

## 2023-03-04 PROCEDURE — A9585 GADOBUTROL INJECTION: HCPCS | Performed by: FAMILY MEDICINE

## 2023-03-04 PROCEDURE — 74183 MRI ABDOMEN W WO CONTRAST: ICD-10-PCS | Mod: 26,,, | Performed by: STUDENT IN AN ORGANIZED HEALTH CARE EDUCATION/TRAINING PROGRAM

## 2023-03-04 RX ORDER — GADOBUTROL 604.72 MG/ML
10 INJECTION INTRAVENOUS
Status: COMPLETED | OUTPATIENT
Start: 2023-03-04 | End: 2023-03-04

## 2023-03-04 RX ADMIN — GADOBUTROL 10 ML: 604.72 INJECTION INTRAVENOUS at 01:03

## 2023-03-07 DIAGNOSIS — C18.9 COLON CANCER METASTASIZED TO LIVER: Primary | ICD-10-CM

## 2023-03-07 DIAGNOSIS — C78.7 COLON CANCER METASTASIZED TO LIVER: Primary | ICD-10-CM

## 2023-03-08 ENCOUNTER — OFFICE VISIT (OUTPATIENT)
Dept: SURGERY | Facility: CLINIC | Age: 73
End: 2023-03-08
Payer: MEDICARE

## 2023-03-08 VITALS
HEIGHT: 67 IN | DIASTOLIC BLOOD PRESSURE: 58 MMHG | BODY MASS INDEX: 18.35 KG/M2 | SYSTOLIC BLOOD PRESSURE: 98 MMHG | OXYGEN SATURATION: 100 % | WEIGHT: 116.94 LBS | HEART RATE: 96 BPM

## 2023-03-08 DIAGNOSIS — C18.9 METASTATIC COLON CANCER TO LIVER: Primary | ICD-10-CM

## 2023-03-08 DIAGNOSIS — C78.7 METASTATIC COLON CANCER TO LIVER: Primary | ICD-10-CM

## 2023-03-08 PROCEDURE — 3078F DIAST BP <80 MM HG: CPT | Mod: CPTII,S$GLB,,

## 2023-03-08 PROCEDURE — 1126F PR PAIN SEVERITY QUANTIFIED, NO PAIN PRESENT: ICD-10-PCS | Mod: CPTII,S$GLB,,

## 2023-03-08 PROCEDURE — 3008F PR BODY MASS INDEX (BMI) DOCUMENTED: ICD-10-PCS | Mod: CPTII,S$GLB,,

## 2023-03-08 PROCEDURE — 3288F FALL RISK ASSESSMENT DOCD: CPT | Mod: CPTII,S$GLB,,

## 2023-03-08 PROCEDURE — 3074F SYST BP LT 130 MM HG: CPT | Mod: CPTII,S$GLB,,

## 2023-03-08 PROCEDURE — 99999 PR PBB SHADOW E&M-EST. PATIENT-LVL III: ICD-10-PCS | Mod: PBBFAC,,,

## 2023-03-08 PROCEDURE — 1101F PR PT FALLS ASSESS DOC 0-1 FALLS W/OUT INJ PAST YR: ICD-10-PCS | Mod: CPTII,S$GLB,,

## 2023-03-08 PROCEDURE — 3074F PR MOST RECENT SYSTOLIC BLOOD PRESSURE < 130 MM HG: ICD-10-PCS | Mod: CPTII,S$GLB,,

## 2023-03-08 PROCEDURE — 3288F PR FALLS RISK ASSESSMENT DOCUMENTED: ICD-10-PCS | Mod: CPTII,S$GLB,,

## 2023-03-08 PROCEDURE — 1101F PT FALLS ASSESS-DOCD LE1/YR: CPT | Mod: CPTII,S$GLB,,

## 2023-03-08 PROCEDURE — 3008F BODY MASS INDEX DOCD: CPT | Mod: CPTII,S$GLB,,

## 2023-03-08 PROCEDURE — 3078F PR MOST RECENT DIASTOLIC BLOOD PRESSURE < 80 MM HG: ICD-10-PCS | Mod: CPTII,S$GLB,,

## 2023-03-08 PROCEDURE — 99215 PR OFFICE/OUTPT VISIT, EST, LEVL V, 40-54 MIN: ICD-10-PCS | Mod: S$GLB,,,

## 2023-03-08 PROCEDURE — 1126F AMNT PAIN NOTED NONE PRSNT: CPT | Mod: CPTII,S$GLB,,

## 2023-03-08 PROCEDURE — 99999 PR PBB SHADOW E&M-EST. PATIENT-LVL III: CPT | Mod: PBBFAC,,,

## 2023-03-08 PROCEDURE — 99215 OFFICE O/P EST HI 40 MIN: CPT | Mod: S$GLB,,,

## 2023-03-09 ENCOUNTER — LAB VISIT (OUTPATIENT)
Dept: LAB | Facility: HOSPITAL | Age: 73
End: 2023-03-09
Attending: INTERNAL MEDICINE
Payer: MEDICARE

## 2023-03-09 ENCOUNTER — OFFICE VISIT (OUTPATIENT)
Dept: HEMATOLOGY/ONCOLOGY | Facility: CLINIC | Age: 73
End: 2023-03-09
Payer: MEDICARE

## 2023-03-09 VITALS
HEART RATE: 98 BPM | SYSTOLIC BLOOD PRESSURE: 92 MMHG | HEIGHT: 67 IN | TEMPERATURE: 98 F | OXYGEN SATURATION: 100 % | DIASTOLIC BLOOD PRESSURE: 68 MMHG | BODY MASS INDEX: 18.37 KG/M2 | RESPIRATION RATE: 18 BRPM | WEIGHT: 117.06 LBS

## 2023-03-09 DIAGNOSIS — R39.9 LOWER URINARY TRACT SYMPTOMS (LUTS): ICD-10-CM

## 2023-03-09 DIAGNOSIS — C78.7 METASTATIC COLON CANCER TO LIVER: Primary | ICD-10-CM

## 2023-03-09 DIAGNOSIS — C78.7 METASTATIC COLON CANCER TO LIVER: ICD-10-CM

## 2023-03-09 DIAGNOSIS — R52 PAIN: ICD-10-CM

## 2023-03-09 DIAGNOSIS — T45.1X5A ANEMIA ASSOCIATED WITH CHEMOTHERAPY: ICD-10-CM

## 2023-03-09 DIAGNOSIS — D84.81 IMMUNODEFICIENCY SECONDARY TO NEOPLASM: ICD-10-CM

## 2023-03-09 DIAGNOSIS — C18.9 METASTATIC COLON CANCER TO LIVER: ICD-10-CM

## 2023-03-09 DIAGNOSIS — D84.821 IMMUNODEFICIENCY DUE TO CHEMOTHERAPY: ICD-10-CM

## 2023-03-09 DIAGNOSIS — N17.9 AKI (ACUTE KIDNEY INJURY): ICD-10-CM

## 2023-03-09 DIAGNOSIS — D49.9 IMMUNODEFICIENCY SECONDARY TO NEOPLASM: ICD-10-CM

## 2023-03-09 DIAGNOSIS — C18.9 METASTATIC COLON CANCER TO LIVER: Primary | ICD-10-CM

## 2023-03-09 DIAGNOSIS — E43 SEVERE MALNUTRITION: ICD-10-CM

## 2023-03-09 DIAGNOSIS — T45.1X5A IMMUNODEFICIENCY DUE TO CHEMOTHERAPY: ICD-10-CM

## 2023-03-09 DIAGNOSIS — R63.4 WEIGHT DECREASE: ICD-10-CM

## 2023-03-09 DIAGNOSIS — Z79.899 IMMUNODEFICIENCY DUE TO CHEMOTHERAPY: ICD-10-CM

## 2023-03-09 DIAGNOSIS — K59.03 DRUG-INDUCED CONSTIPATION: ICD-10-CM

## 2023-03-09 DIAGNOSIS — I10 PRIMARY HYPERTENSION: ICD-10-CM

## 2023-03-09 DIAGNOSIS — D64.81 ANEMIA ASSOCIATED WITH CHEMOTHERAPY: ICD-10-CM

## 2023-03-09 LAB
ALBUMIN SERPL BCP-MCNC: 2.3 G/DL (ref 3.5–5.2)
ALP SERPL-CCNC: 176 U/L (ref 55–135)
ALT SERPL W/O P-5'-P-CCNC: 10 U/L (ref 10–44)
ANION GAP SERPL CALC-SCNC: 9 MMOL/L (ref 8–16)
AST SERPL-CCNC: 23 U/L (ref 10–40)
BASOPHILS # BLD AUTO: 0.02 K/UL (ref 0–0.2)
BASOPHILS NFR BLD: 0.3 % (ref 0–1.9)
BILIRUB SERPL-MCNC: 0.8 MG/DL (ref 0.1–1)
BUN SERPL-MCNC: 9 MG/DL (ref 8–23)
CALCIUM SERPL-MCNC: 8.8 MG/DL (ref 8.7–10.5)
CEA SERPL-MCNC: 7.7 NG/ML (ref 0–5)
CHLORIDE SERPL-SCNC: 105 MMOL/L (ref 95–110)
CO2 SERPL-SCNC: 23 MMOL/L (ref 23–29)
CREAT SERPL-MCNC: 0.7 MG/DL (ref 0.5–1.4)
DIFFERENTIAL METHOD: ABNORMAL
EOSINOPHIL # BLD AUTO: 0.1 K/UL (ref 0–0.5)
EOSINOPHIL NFR BLD: 1.7 % (ref 0–8)
ERYTHROCYTE [DISTWIDTH] IN BLOOD BY AUTOMATED COUNT: 15.8 % (ref 11.5–14.5)
EST. GFR  (NO RACE VARIABLE): >60 ML/MIN/1.73 M^2
GLUCOSE SERPL-MCNC: 105 MG/DL (ref 70–110)
HCT VFR BLD AUTO: 26.4 % (ref 40–54)
HGB BLD-MCNC: 7.6 G/DL (ref 14–18)
IMM GRANULOCYTES # BLD AUTO: 0.03 K/UL (ref 0–0.04)
IMM GRANULOCYTES NFR BLD AUTO: 0.5 % (ref 0–0.5)
LYMPHOCYTES # BLD AUTO: 1.3 K/UL (ref 1–4.8)
LYMPHOCYTES NFR BLD: 22 % (ref 18–48)
MCH RBC QN AUTO: 25 PG (ref 27–31)
MCHC RBC AUTO-ENTMCNC: 28.8 G/DL (ref 32–36)
MCV RBC AUTO: 87 FL (ref 82–98)
MONOCYTES # BLD AUTO: 1.2 K/UL (ref 0.3–1)
MONOCYTES NFR BLD: 19.2 % (ref 4–15)
NEUTROPHILS # BLD AUTO: 3.4 K/UL (ref 1.8–7.7)
NEUTROPHILS NFR BLD: 56.3 % (ref 38–73)
NRBC BLD-RTO: 0 /100 WBC
PLATELET # BLD AUTO: 275 K/UL (ref 150–450)
PMV BLD AUTO: 9.2 FL (ref 9.2–12.9)
POTASSIUM SERPL-SCNC: 3.7 MMOL/L (ref 3.5–5.1)
PROT SERPL-MCNC: 7 G/DL (ref 6–8.4)
RBC # BLD AUTO: 3.04 M/UL (ref 4.6–6.2)
SODIUM SERPL-SCNC: 137 MMOL/L (ref 136–145)
WBC # BLD AUTO: 5.99 K/UL (ref 3.9–12.7)

## 2023-03-09 PROCEDURE — 3008F PR BODY MASS INDEX (BMI) DOCUMENTED: ICD-10-PCS | Mod: CPTII,S$GLB,, | Performed by: REGISTERED NURSE

## 2023-03-09 PROCEDURE — 99999 PR PBB SHADOW E&M-EST. PATIENT-LVL III: ICD-10-PCS | Mod: PBBFAC,,, | Performed by: REGISTERED NURSE

## 2023-03-09 PROCEDURE — 1101F PR PT FALLS ASSESS DOC 0-1 FALLS W/OUT INJ PAST YR: ICD-10-PCS | Mod: CPTII,S$GLB,, | Performed by: REGISTERED NURSE

## 2023-03-09 PROCEDURE — 3074F SYST BP LT 130 MM HG: CPT | Mod: CPTII,S$GLB,, | Performed by: REGISTERED NURSE

## 2023-03-09 PROCEDURE — 3078F PR MOST RECENT DIASTOLIC BLOOD PRESSURE < 80 MM HG: ICD-10-PCS | Mod: CPTII,S$GLB,, | Performed by: REGISTERED NURSE

## 2023-03-09 PROCEDURE — 3078F DIAST BP <80 MM HG: CPT | Mod: CPTII,S$GLB,, | Performed by: REGISTERED NURSE

## 2023-03-09 PROCEDURE — 1101F PT FALLS ASSESS-DOCD LE1/YR: CPT | Mod: CPTII,S$GLB,, | Performed by: REGISTERED NURSE

## 2023-03-09 PROCEDURE — 99999 PR PBB SHADOW E&M-EST. PATIENT-LVL III: CPT | Mod: PBBFAC,,, | Performed by: REGISTERED NURSE

## 2023-03-09 PROCEDURE — 99214 OFFICE O/P EST MOD 30 MIN: CPT | Mod: S$GLB,,, | Performed by: REGISTERED NURSE

## 2023-03-09 PROCEDURE — 99214 PR OFFICE/OUTPT VISIT, EST, LEVL IV, 30-39 MIN: ICD-10-PCS | Mod: S$GLB,,, | Performed by: REGISTERED NURSE

## 2023-03-09 PROCEDURE — 1159F MED LIST DOCD IN RCRD: CPT | Mod: CPTII,S$GLB,, | Performed by: REGISTERED NURSE

## 2023-03-09 PROCEDURE — 1159F PR MEDICATION LIST DOCUMENTED IN MEDICAL RECORD: ICD-10-PCS | Mod: CPTII,S$GLB,, | Performed by: REGISTERED NURSE

## 2023-03-09 PROCEDURE — 1160F RVW MEDS BY RX/DR IN RCRD: CPT | Mod: CPTII,S$GLB,, | Performed by: REGISTERED NURSE

## 2023-03-09 PROCEDURE — 85025 COMPLETE CBC W/AUTO DIFF WBC: CPT | Performed by: INTERNAL MEDICINE

## 2023-03-09 PROCEDURE — 82378 CARCINOEMBRYONIC ANTIGEN: CPT | Performed by: INTERNAL MEDICINE

## 2023-03-09 PROCEDURE — 3008F BODY MASS INDEX DOCD: CPT | Mod: CPTII,S$GLB,, | Performed by: REGISTERED NURSE

## 2023-03-09 PROCEDURE — 3288F FALL RISK ASSESSMENT DOCD: CPT | Mod: CPTII,S$GLB,, | Performed by: REGISTERED NURSE

## 2023-03-09 PROCEDURE — 1160F PR REVIEW ALL MEDS BY PRESCRIBER/CLIN PHARMACIST DOCUMENTED: ICD-10-PCS | Mod: CPTII,S$GLB,, | Performed by: REGISTERED NURSE

## 2023-03-09 PROCEDURE — 1126F PR PAIN SEVERITY QUANTIFIED, NO PAIN PRESENT: ICD-10-PCS | Mod: CPTII,S$GLB,, | Performed by: REGISTERED NURSE

## 2023-03-09 PROCEDURE — 80053 COMPREHEN METABOLIC PANEL: CPT | Performed by: INTERNAL MEDICINE

## 2023-03-09 PROCEDURE — 3288F PR FALLS RISK ASSESSMENT DOCUMENTED: ICD-10-PCS | Mod: CPTII,S$GLB,, | Performed by: REGISTERED NURSE

## 2023-03-09 PROCEDURE — 3074F PR MOST RECENT SYSTOLIC BLOOD PRESSURE < 130 MM HG: ICD-10-PCS | Mod: CPTII,S$GLB,, | Performed by: REGISTERED NURSE

## 2023-03-09 PROCEDURE — 1126F AMNT PAIN NOTED NONE PRSNT: CPT | Mod: CPTII,S$GLB,, | Performed by: REGISTERED NURSE

## 2023-03-09 NOTE — PROGRESS NOTES
"[facility]  [facility]  Hematology/Medical Oncology Clinic     PATIENT: Javier Downs  MRN: [id number]  DATE: 3/9/2023    Diagnosis: Transverse colon metastatic cancer to the liver     Oncological history:   04/20/2021: metastatic colon cancer to the liver, moderately differentiated, pMMR  05/06/2021: C1 mFOLFOX + Pema  05/20/2021: C2 mFOLFOX + Pema  06/03/2021: C3 mFOLFOX + Pema   06/17/2021: C4 mFOLFOX + Pema  07/01/2021: C5 mFOLFOX + Pema  07/15/2021: C6 mFOLFOX + Pema  Restaging CT CAP: significant improvement of lesions   07/29/2021: C7 mFOLFOX + Pema   08/12/2021: Switched to maintenance  5FU+Pema (C8)  06/23/2022: C30 maintenance 5FU+Pema  10/20/2022: R hemicolectomy with Dr. Bonilla  12/30/2022: Y-90 to R liver  1/27/2023: Y-90 to R liver    Oncological History copied from medical chart:   Mr. Downs is a 72 y.o. male   70 years old pleasant AA gentleman, with history of hypertension, presenting with two weeks duration of abdominal cramps, diarrhea, nausea and vomiting. His symptoms started gradually with intermittent generalize crampy abdominal pain, worse with food. That was associated with nausea, reflux and occaisonal vomiting. He tried pepto-bismol and imodium with no relief, and hence he presented to ER.   He lost significant amount of weight, but cannot quantify the amount or the time period. He has also been feeling weaker than usual.   He hasn't seen a healthcare provider in over than 10 years. He has never had a colonoscopy.   In the ER. His labs were significant of microcytic anemia of 9.7 g/dl, MCV 77, and Platelets counts of 495. CT of the abdomen and pelvis showed " Large mass in the transverse colon highly suspicious for adenocarcinoma resulting in at least high-grade partial obstruction with dilation of proximal colon and small bowel. Soft tissue nodules in the adjacent mesentery are suspicious for pily metastases and/or mesenteric implants.  Numerous hepatic masses " "measuring up to 11.2 cm most likely related to metastatic disease.  There also several small subcapsular lesions which could reflect additional metastatic disease"   CT chest was negative to metastatic disease.   He underwent colonoscopy and stent placement. 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 HER 2 negative, VIRI/SHERI NGS was cancelled due to insufficient quantity   Guardant 360 was sent, negative for KRAS, NRAS, BRAF     He started palliative chemotherapy with FOLFOX+Pema     Interval History:   He presents today with his daughter for follow-up prior to cycle 43 of FOLFOX.  Y-90 4/4, then another one to follow          He is feeling better since he is more removed from his Y-90.  No pain today.  Bowel movements are normal.  Eating well.  Weight remains stable.  Stable paresthesias in feet, no neuropathy in hands.  No other complaints today.      Presents with his daughter. ECOG status is 1.       Past Medical History:   Past Medical History:   Diagnosis Date    MARIA A (acute kidney injury) 8/18/2022    Hypertension     Metastatic colon cancer to liver 4/22/2021       Past Surgical HIstory:   Past Surgical History:   Procedure Laterality Date    COLONOSCOPY N/A 4/20/2021    Procedure: COLONOSCOPY with possible stent;  Surgeon: EDILMA Bonilla MD;  Location: [facility] ([address] [place] [address]);  Service: Colon and Rectal;  Laterality: N/A;    DIAGNOSTIC LAPAROSCOPY N/A 9/7/2022    Procedure: LAPAROSCOPY, DIAGNOSTIC;  Surgeon: Adrian Rodriguez MD;  [facility];  Service: General;  Laterality: N/A;    INSERTION OF TUNNELED CENTRAL VENOUS CATHETER (CVC) WITH SUBCUTANEOUS PORT N/A 5/3/2021    Procedure: [id number];  Surgeon: Francisco Layton MD;  [facility];  Service: Vascular;  Laterality: N/A;    OMENTECTOMY N/A 10/20/2022    Procedure: " OMENTECTOMY;  Surgeon: EDILMA Bonilla MD;  Location: [facility] OR 2ND FLR;  Service: Colon and Rectal;  Laterality: N/A;    RIGHT HEMICOLECTOMY N/A 10/20/2022    Procedure: HEMICOLECTOMY, RIGHT, extended;  Surgeon: EDILMA Bonilla MD;  Location: [facility] OR 2ND FLR;  Service: Colon and Rectal;  Laterality: N/A;  Extended right hemicolectomy CONSENT IN AM       Family History: History reviewed. No pertinent family history.    Social History:  reports that he has never smoked. He has never used smokeless tobacco. He reports that he does not currently use alcohol. He reports that he does not use drugs.    Allergies:  Review of patient's allergies indicates:  No Known Allergies    Medications:  Current Outpatient Medications   Medication Sig Dispense Refill    acetaminophen (TYLENOL) 500 MG tablet Take 1 tablet (500 mg total) by mouth every 6 (six) hours as needed for Pain (alternate with ibuprofen). (Patient not taking: Reported on 3/8/2023)  0    amLODIPine (NORVASC) 10 MG tablet Take 1 tablet (10 mg total) by mouth once daily. 90 tablet 3    ibuprofen (ADVIL,MOTRIN) 400 MG tablet Take 1 tablet (400 mg total) by mouth every 6 (six) hours as needed for Other (pain). Alternate with tylenol (Patient not taking: Reported on 3/8/2023)      methocarbamoL (ROBAXIN) 500 MG Tab Take 1 tablet (500 mg total) by mouth 3 (three) times daily. 90 tablet 0    ondansetron (ZOFRAN) 4 MG tablet Take 1 tablet (4 mg total) by mouth every 8 (eight) hours as needed for Nausea. (Patient not taking: Reported on 3/8/2023) 30 tablet 3    oxyCODONE (ROXICODONE) 5 MG immediate release tablet Take 1 tablet (5 mg total) by mouth every 6 (six) hours as needed for Pain. (Patient not taking: Reported on 3/8/2023) 20 tablet 0    tamsulosin (FLOMAX) 0.4 mg Cap Take 1 capsule (0.4 mg total) by mouth once daily. 30 capsule 5     No current facility-administered medications for this visit.       Review of Systems   Constitutional:  Positive for fatigue. Negative  "for activity change, appetite change, chills, diaphoresis, fever and unexpected weight change.   HENT:  Negative for congestion, mouth sores, nosebleeds, postnasal drip, rhinorrhea, trouble swallowing and voice change.    Eyes:  Negative for pain and visual disturbance.   Respiratory:  Negative for cough, chest tightness, shortness of breath and wheezing.    Cardiovascular:  Negative for chest pain, palpitations and leg swelling.   Gastrointestinal:  Negative for abdominal distention, abdominal pain, blood in stool, constipation, diarrhea, nausea and vomiting.   Genitourinary:  Negative for difficulty urinating, dysuria, flank pain, frequency, hematuria and urgency.   Musculoskeletal:  Negative for arthralgias, back pain and myalgias.   Skin:  Negative for rash and wound.   Neurological:  Positive for numbness. Negative for dizziness, facial asymmetry, weakness, light-headedness and headaches.   Psychiatric/Behavioral:  Negative for agitation, behavioral problems, confusion, decreased concentration, dysphoric mood and sleep disturbance. The patient is not nervous/anxious.    All other systems reviewed and are negative.    ECOG Performance Status: 1     Objective:      Vitals:   Vitals:    03/09/23 1020   BP: 92/68   BP Location: Right arm   Patient Position: Sitting   BP Method: Medium (Automatic)   Pulse: 98   Resp: 18   Temp: 98.2 °F (36.8 °C)   TempSrc: Oral   SpO2: 100%   Weight: 53.1 kg (117 lb 1 oz)   Height: 5' 7" (1.702 m)         Physical Exam  Vitals reviewed.   Constitutional:       General: He is not in acute distress.     Appearance: Normal appearance. He is not ill-appearing, toxic-appearing or diaphoretic.   HENT:      Head: Normocephalic and atraumatic.      Right Ear: External ear normal.      Left Ear: External ear normal.   Eyes:      General: No scleral icterus.     Extraocular Movements: Extraocular movements intact.      Conjunctiva/sclera: Conjunctivae normal.      Pupils: Pupils are equal, " round, and reactive to light.   Cardiovascular:      Rate and Rhythm: Normal rate and regular rhythm.      Pulses: Normal pulses.      Heart sounds: Normal heart sounds. No murmur heard.  Pulmonary:      Effort: Pulmonary effort is normal. No respiratory distress.      Breath sounds: Normal breath sounds. No wheezing.   Chest:      Comments: Port to RCW, no signs of infection.  Abdominal:      General: Abdomen is flat. Bowel sounds are normal. There is no distension.      Palpations: Abdomen is soft. There is no mass.      Tenderness: There is no abdominal tenderness.      Comments: Vertical midline abdominal wound well healed   Musculoskeletal:         General: No swelling or deformity. Normal range of motion.      Right lower leg: No edema.      Left lower leg: No edema.   Skin:     Coloration: Skin is not jaundiced or pale.      Findings: No bruising, erythema or rash.   Neurological:      General: No focal deficit present.      Mental Status: He is alert and oriented to person, place, and time. Mental status is at baseline.      Cranial Nerves: No cranial nerve deficit.      Sensory: No sensory deficit.      Gait: Gait normal.   Psychiatric:         Mood and Affect: Mood normal.         Behavior: Behavior normal.         Thought Content: Thought content normal.         Judgment: Judgment normal.     Laboratory Data:  Lab Visit on 03/09/2023   Component Date Value Ref Range Status    WBC 03/09/2023 5.99  3.90 - 12.70 K/uL Final    RBC 03/09/2023 3.04 (L)  4.60 - 6.20 M/uL Final    Hemoglobin 03/09/2023 7.6 (L)  14.0 - 18.0 g/dL Final    Hematocrit 03/09/2023 26.4 (L)  40.0 - 54.0 % Final    MCV 03/09/2023 87  82 - 98 fL Final    MCH 03/09/2023 25.0 (L)  27.0 - 31.0 pg Final    MCHC 03/09/2023 28.8 (L)  32.0 - 36.0 g/dL Final    RDW 03/09/2023 15.8 (H)  11.5 - 14.5 % Final    Platelets 03/09/2023 275  150 - 450 K/uL Final    MPV 03/09/2023 9.2  9.2 - 12.9 fL Final    Immature Granulocytes 03/09/2023 0.5  0.0 - 0.5  % Final    Gran # (ANC) 03/09/2023 3.4  1.8 - 7.7 K/uL Final    Immature Grans (Abs) 03/09/2023 0.03  0.00 - 0.04 K/uL Final    Comment: Mild elevation in immature granulocytes is non specific and   can be seen in a variety of conditions including stress response,   acute inflammation, trauma and pregnancy. Correlation with other   laboratory and clinical findings is essential.      Lymph # 03/09/2023 1.3  1.0 - 4.8 K/uL Final    Mono # 03/09/2023 1.2 (H)  0.3 - 1.0 K/uL Final    Eos # 03/09/2023 0.1  0.0 - 0.5 K/uL Final    Baso # 03/09/2023 0.02  0.00 - 0.20 K/uL Final    nRBC 03/09/2023 0  0 /100 WBC Final    Gran % 03/09/2023 56.3  38.0 - 73.0 % Final    Lymph % 03/09/2023 22.0  18.0 - 48.0 % Final    Mono % 03/09/2023 19.2 (H)  4.0 - 15.0 % Final    Eosinophil % 03/09/2023 1.7  0.0 - 8.0 % Final    Basophil % 03/09/2023 0.3  0.0 - 1.9 % Final    Differential Method 03/09/2023 Automated   Final    Sodium 03/09/2023 137  136 - 145 mmol/L Final    Potassium 03/09/2023 3.7  3.5 - 5.1 mmol/L Final    Chloride 03/09/2023 105  95 - 110 mmol/L Final    CO2 03/09/2023 23  23 - 29 mmol/L Final    Glucose 03/09/2023 105  70 - 110 mg/dL Final    BUN 03/09/2023 9  8 - 23 mg/dL Final    Creatinine 03/09/2023 0.7  0.5 - 1.4 mg/dL Final    Calcium 03/09/2023 8.8  8.7 - 10.5 mg/dL Final    Total Protein 03/09/2023 7.0  6.0 - 8.4 g/dL Final    Albumin 03/09/2023 2.3 (L)  3.5 - 5.2 g/dL Final    Total Bilirubin 03/09/2023 0.8  0.1 - 1.0 mg/dL Final    Comment: For infants and newborns, interpretation of results should be based  on gestational age, weight and in agreement with clinical  observations.    Premature Infant recommended reference ranges:  Up to 24 hours.............<8.0 mg/dL  Up to 48 hours............<12.0 mg/dL  3-5 days..................<15.0 mg/dL  6-29 days.................<15.0 mg/dL      Alkaline Phosphatase 03/09/2023 176 (H)  55 - 135 U/L Final    AST 03/09/2023 23  10 - 40 U/L Final    ALT 03/09/2023 10  10  - 44 U/L Final    Anion Gap 03/09/2023 9  8 - 16 mmol/L Final    eGFR 03/09/2023 >60.0  >60 mL/min/1.73 m^2 Final    CEA 03/09/2023 7.7 (H)  0.0 - 5.0 ng/mL Final    Comment: CEA Normal Range:  Non-Smokers: 0-3.0 ng/mL  Smokers:     0-5.0 ng/mL    The testing method is a chemiluminescent microparticle immunoassay   manufactured by [employer] and performed on the [employer]   or   [employer] system. Values obtained with different assay manufacturers   for   methods may be different and cannot be used interchangeably.     Lab Visit on 03/09/2023   Component Date Value Ref Range Status    Specimen UA 03/09/2023 Urine, Unspecified   Final    Color, UA 03/09/2023 Yellow  Yellow, Straw, [place]ix[place] Final    Appearance, UA 03/09/2023 Hazy (A)  Clear Final    pH, UA 03/09/2023 6.0  5.0 - 8.0 Final    Specific Gravity, UA 03/09/2023 1.020  1.005 - 1.030 Final    Protein, UA 03/09/2023 1+ (A)  Negative Final    Comment: Recommend a 24 hour urine protein or a urine   protein/creatinine ratio if globulin induced proteinuria is  clinically suspected.      Glucose, UA 03/09/2023 Negative  Negative Final    Ketones, UA 03/09/2023 Negative  Negative Final    Bilirubin (UA) 03/09/2023 Negative  Negative Final    Occult Blood UA 03/09/2023 1+ (A)  Negative Final    Nitrite, UA 03/09/2023 Negative  Negative Final    Leukocytes, UA 03/09/2023 1+ (A)  Negative Final    RBC, UA 03/09/2023 1  0 - 4 /hpf Final    WBC, UA 03/09/2023 8 (H)  0 - 5 /hpf Final    Bacteria 03/09/2023 Occasional  None-Occ /hpf Final    Squam Epithel, UA 03/09/2023 0  /hpf Final    Hyaline Casts, UA 03/09/2023 0  0-1/lpf /lpf Final    Microscopic Comment 03/09/2023 SEE COMMENT   Final    Comment: Other formed elements not mentioned in the report are not   present in the microscopic examination.        Assessment and Plan        1. 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 Immunodeficiency secondary to neoplasm    4.  Primary hypertension    5. MARIA A (acute kidney injury)    6. Anemia associated with chemotherapy    7. Pain    8. Drug-induced constipation    9. Weight decrease    10. Severe malnutrition    11. Lower urinary tract symptoms (LUTS)      1-3.  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  Guardant 360 was negative for BRAF, VIRI, HER2 amplification.   Pretreatment CEA 57.  We had a long and sonia discussion with him about his diagnosis. Unfortunately, the disease is not curable but remains treatable and he has good performance status.   Restaging scans after 7 cycles of FOLFOX + Pema showed significant reduction in colonic mass and liver mass.   we switched to maintenance as of cycle 8 with 5FU + Pema  Restaging scans from March 2022 show stable disease.   MRI on 7/18/22 showing hepatic progression of disease in the right hepatic lobe noted on the exam. CT CAP on 8/26 shows some mild progression in both liver metastases.    Discussed case at colorectal tumor board 8/31/22 and had a diagnostic lap performed by Dr. Rodriguez on 9/7 to assess for any occult peritoneal disease. Findings from the diagnostic lap were negative. Fluid from around from around the primary mass was sent for cytology that was negative for malignancy.   Underwent primary tumor resection on 10/20/22 with Dr. Bonilla.    Meanwhile his liver mets had grown.  We discussed his case at [facility] conference with plans for short term Y-90 and then restarting chemotherapy prior to considering any surgical options to his liver metastases.  He underwent Y-90 delivery on 12/30/22. Tolerated well.   CT CAP on 1/23/23 reviewed at [facility] conference with good response to Y-90, no new lesions.  Underwent second Y-90 on 1/27/23. Can consider R hepatectomy pending follow-up imaging after Y-90.     Received cycle 42 of FOLFOX (omitted oxaliplatin again starting cycle 41 due to neuropathy) on 2/9/23.  Because of 5-FU shortage nationally, we have  been holding chemotherapy today since mid-February 2023.  If he needs to restart chemotherapy (I.e. no plans for surgical resection), will place him on capecitabine maintenance for duration of 5-FU shortage.     Discussed with Dr. Rodriguez and plan to evaluate for PVE pending further calculations.   Will hold dwight given potential for PVE in near future.   Scheduled for Y-90 mapping 4/4/23 with IR.     Will review patient again at next [facility] tumor board as well to determine best course of action.     Monitor CEA.     RTC in 2 weeks to discuss next steps and review tumor board discussion. Will schedule infusion for possible blood administration.     4. Hypertension   -- Low in clinic today. HR WNL. Asymptomatic.    -- taking Amlodipine .   -- Following with PCP.   -- encouraged him and his daughter to monitor BP and HR closely at home.     5. MARIA A  -- Improved renal function. Cr normal.   -- Monitor. Encouraged continued hydration.     6. Anemia  -- Hgb dropped from 9.4 to 7.6.   -- Asymptomatic. Monitor closely.   -- No signs of bleeding. Platelets normal.   -- Will check T&S with next labs.   -- Advised patient and daughter to monitor BP and HR and report any changes in vitals. Reviewed additional symptoms to report, including SOB, worsening fatigue, weakness, lightheadedness, dizziness, etc.     7-10. Neoplasm related pain, weight loss, constipation  -- Will continue with pain management at this time as pain control is improved. Denies pain in clinic today.   -- Following with nutrition. Encouraged increased protein. Had been drinking Boost daily but recently ran out. Will obtain more to restart daily intake. Weight improved.  -- Continue Miralax daily for constipation.    11. Urinary hesitancy  -- Continue Flomax.  -- Reports improvement since starting medication.     Patient is in agreement with the proposed treatment plan. All questions were answered to the patient's satisfaction. Pt knows to call clinic if anything  is needed before the next clinic visit.    Patient discussed with collaborating physician, Dr. Schuster.    At least 30 minutes were spent today on this encounter including face to face time with the patient, data gathering/interpretation and documentation.       Natividad Maher, MSN, APRN, [facility]  Hematology and Medical Oncology  Clinical Nurse Specialist to Dr. Schuster, Dr. Quijano & Dr. Mueller      Route Chart for Scheduling    Med Onc Chart Routing      Follow up with physician 2 weeks. RTC in 2 weeks with labs (CBC,CMP,CEA,Type&Screen) to see Dr. Schuster in clinic with possible blood transfusion to follow   Follow up with RACHEL    Infusion scheduling note    Injection scheduling note    Labs CBC, CMP, CEA and other   Scheduling:  Preferred lab:  Lab interval:  +type&screen   Imaging    Pharmacy appointment    Other referrals           Treatment Plan Information   OP COLORECTAL FOLFOX + BEVACIZUMAB Q2W   Torres Pantoja MD   Upcoming Treatment Dates - OP COLORECTAL FOLFOX + BEVACIZUMAB Q2W    2/24/2023       Chemotherapy       fluorouraciL in sodium chloride 0.9% 100 mL chemo infusion       Antiemetics       palonosetron (ALOXI) 0.25 mg with Dexamethasone (DECADRON) 12 mg in NS 50 mL IVPB  3/10/2023       Chemotherapy       fluorouraciL in sodium chloride 0.9% 100 mL chemo infusion       Antiemetics       palonosetron (ALOXI) 0.25 mg with Dexamethasone (DECADRON) 12 mg in NS 50 mL IVPB    Therapy Plan Information  IV Fluids  sodium chloride 0.9% bolus 1,000 mL 1,000 mL  1,000 mL, Intravenous, Every visit  Flushes  sodium chloride 0.9% flush 10 mL  10 mL, Intravenous, PRN  heparin, porcine (PF) 100 unit/mL injection flush 500 Units  500 Units, Intravenous, PRN

## 2023-03-13 NOTE — PROGRESS NOTES
Encounter Date:  3/8/2023    Patient ID: Javier Downs  Age:  72 y.o. :  1950    Chief Complaint:  followup of colon cancer metastasized to liver     Interval History:  Mr. Downs returns to clinic with colon cancer metastasized to liver. He was initially diagnosed in  and has undergone 43 cycles of FOLFOX, right hemicolectomy with Dr. Bonilla in 10/2022, and is now s/p 3 Y90 treatments to the liver mets with the most recent on 23. He is being followed by Dr. Schuster.     He reports that he is feeling better since his last Y90. He is tolerating chemotherapy well. Denies N/V/D, abdominal pain. Is tolerating a regular diet.     New Data:  Imaging:  I personally reviewed the following images:   3/4/23: MRI Abd:  Impression:  Interval radioembolization of right hepatic lobe metastasis noting extensive heterogeneous signal and enhancement throughout treatment site.  Recommend continued close follow-up.  No definite new lesion.     Trace right-sided pleural effusion.     Additional findings as above.    23: CT Chest A/P:  Impression:  Postoperative changes from right hemicolectomy for colon cancer.     Multiple liver metastases, slightly decreased in size after radioembolization.  No new metastases in the chest, abdomen, or pelvis.     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     Other findings as described.    22: CT A/P:  Impression:  1. Significantly worsening hepatic metastatic disease.  2. 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  3. Postoperative changes of partial colectomy.  No evidence to suggest an anastomotic leak, bowel obstruction, or ileus.  4. Mild abdominopelvic ascites.  5. Mild urinary bladder wall thickening, correlate with urinalysis to exclude cystitis.  6. Colonic diverticulosis.    Labs:        Chemistry        Component Value Date/Time     2023 0934    K 3.7 2023  0934     03/09/2023 0934    CO2 23 03/09/2023 0934    BUN 9 03/09/2023 0934    CREATININE 0.7 03/09/2023 0934     03/09/2023 0934        Component Value Date/Time    CALCIUM 8.8 03/09/2023 0934    ALKPHOS 176 (H) 03/09/2023 0934    AST 23 03/09/2023 0934    ALT 10 03/09/2023 0934    BILITOT 0.8 03/09/2023 0934    ESTGFRAFRICA >60.0 07/21/2022 1053    EGFRNONAA >60.0 07/21/2022 1053        Lab Results   Component Value Date    WBC 5.99 03/09/2023    HGB 7.6 (L) 03/09/2023    HCT 26.4 (L) 03/09/2023    MCV 87 03/09/2023     03/09/2023     Past Medical History:   Diagnosis Date    MARIA A (acute kidney injury) 8/18/2022    Hypertension     Metastatic colon cancer to liver 4/22/2021     Past Surgical History:   Procedure Laterality Date    COLONOSCOPY N/A 4/20/2021    Procedure: COLONOSCOPY with possible stent;  Surgeon: EDILMA Bonilla MD;  Location: [facility] ([address] [place] [address]);  Service: Colon and Rectal;  Laterality: N/A;    DIAGNOSTIC LAPAROSCOPY N/A 9/7/2022    Procedure: LAPAROSCOPY, DIAGNOSTIC;  Surgeon: Adrian Rodriguez MD;  [facility];  Service: General;  Laterality: N/A;    INSERTION OF TUNNELED CENTRAL VENOUS CATHETER (CVC) WITH SUBCUTANEOUS PORT N/A 5/3/2021    Procedure: [id number];  Surgeon: Francisco Layton MD;  [facility];  Service: Vascular;  Laterality: N/A;    OMENTECTOMY N/A 10/20/2022    Procedure: OMENTECTOMY;  Surgeon: EDILMA Bonilla MD;  [facility];  Service: Colon and Rectal;  Laterality: N/A;    RIGHT HEMICOLECTOMY N/A 10/20/2022    Procedure: HEMICOLECTOMY, RIGHT, extended;  Surgeon: EDILMA Bonilla MD;  [facility];  Service: Colon and Rectal;  Laterality: N/A;  Extended right hemicolectomy CONSENT IN AM     Current Outpatient Medications on File Prior to Visit   Medication Sig Dispense Refill    amLODIPine (NORVASC) 10 MG tablet Take 1 tablet (10 mg total) by mouth once daily. 90 tablet 3    methocarbamoL  "(ROBAXIN) 500 MG Tab Take 1 tablet (500 mg total) by mouth 3 (three) times daily. 90 tablet 0    tamsulosin (FLOMAX) 0.4 mg Cap Take 1 capsule (0.4 mg total) by mouth once daily. 30 capsule 5    acetaminophen (TYLENOL) 500 MG tablet Take 1 tablet (500 mg total) by mouth every 6 (six) hours as needed for Pain (alternate with ibuprofen). (Patient not taking: Reported on 3/8/2023)  0    ibuprofen (ADVIL,MOTRIN) 400 MG tablet Take 1 tablet (400 mg total) by mouth every 6 (six) hours as needed for Other (pain). Alternate with tylenol (Patient not taking: Reported on 3/8/2023)      ondansetron (ZOFRAN) 4 MG tablet Take 1 tablet (4 mg total) by mouth every 8 (eight) hours as needed for Nausea. (Patient not taking: Reported on 3/8/2023) 30 tablet 3    oxyCODONE (ROXICODONE) 5 MG immediate release tablet Take 1 tablet (5 mg total) by mouth every 6 (six) hours as needed for Pain. (Patient not taking: Reported on 3/8/2023) 20 tablet 0     No current facility-administered medications on file prior to visit.     Review of patient's allergies indicates:  No Known Allergies    Family History:  His family history is not on file.     Social History:   reports that he has never smoked. He has never used smokeless tobacco. He reports that he does not currently use alcohol. He reports that he does not use drugs.     ROS:  Pertinent positive/negatives detailed in HPI, all other systems negative.     Physical Exam:  BP (!) 98/58   Pulse 96   Ht 5' 7" (1.702 m)   Wt 53 kg (116 lb 15.3 oz)   SpO2 100%   BMI 18.32 kg/m²     Constitutional:  Non-toxic, no acute distress.    Neck:  Trachea midline,  FROM  Resp:  Easy work of breathing  Abd:  Soft, non-tender, no masses, no ascites  Musculoskeletal:  Ambulatory, normal gait, no muscle wasting.  Extremities are symmetrical without lymphedema.  Neuro:  No gross deficits  Psych:  Awake, alert, oriented.  Answers and asks questions appropriately      ICD-10-CM ICD-9-CM    1. Metastatic colon " cancer to liver  C18.9 153.9     C78.7 197.7         Plan   Mr. Downs returns to clinic with colon cancer metastasized to liver. He was initially diagnosed in 2021 and has undergone 43 cycles of FOLFOX, right hemicolectomy with Dr. Bonilla in 10/2022, and is now s/p 3 Y90 treatments to the liver mets with the most recent on 1/27/23. He is being followed by Dr. Schuster. He has tolerated chemo well. We discussed his most recent imaging. We discussed that would need a right hepatectomy and that it is technically resectable it would be very high risk. He also has portal vein thrombus. We discussed with him the risks associated with major liver surgery such as liver failure. Prior to any surgical intervention he would need embolization for left liver hypertrophy. Will discuss his case at [facility] conference for treatment planning and follow-up afterward.    Questions were asked and answered to patient's satisfaction.  We discussed the need for continued clinical/radiographic/endoscopic follow-up.    Follow up if symptoms worsen or fail to improve.      Pt seen in conjunction with Dr. Rodriguez today.         Jazmin Singh NP  Upper GI / Hepatobiliary Surgical Oncology  [facility] [place], [place]  Office: [phone]  Fax: [phone]

## 2023-03-17 ENCOUNTER — TELEPHONE (OUTPATIENT)
Dept: HEMATOLOGY/ONCOLOGY | Facility: CLINIC | Age: 73
End: 2023-03-17
Payer: MEDICARE

## 2023-03-23 ENCOUNTER — OFFICE VISIT (OUTPATIENT)
Dept: HEMATOLOGY/ONCOLOGY | Facility: CLINIC | Age: 73
End: 2023-03-23
Payer: MEDICARE

## 2023-03-23 ENCOUNTER — LAB VISIT (OUTPATIENT)
Dept: LAB | Facility: HOSPITAL | Age: 73
End: 2023-03-23
Attending: INTERNAL MEDICINE
Payer: MEDICARE

## 2023-03-23 ENCOUNTER — DOCUMENTATION ONLY (OUTPATIENT)
Dept: HEMATOLOGY/ONCOLOGY | Facility: CLINIC | Age: 73
End: 2023-03-23
Payer: MEDICARE

## 2023-03-23 VITALS
HEIGHT: 67 IN | OXYGEN SATURATION: 98 % | RESPIRATION RATE: 18 BRPM | TEMPERATURE: 98 F | DIASTOLIC BLOOD PRESSURE: 69 MMHG | HEART RATE: 83 BPM | WEIGHT: 116.38 LBS | BODY MASS INDEX: 18.27 KG/M2 | SYSTOLIC BLOOD PRESSURE: 112 MMHG

## 2023-03-23 DIAGNOSIS — T45.1X5A ANEMIA ASSOCIATED WITH CHEMOTHERAPY: ICD-10-CM

## 2023-03-23 DIAGNOSIS — Z79.899 IMMUNODEFICIENCY DUE TO CHEMOTHERAPY: ICD-10-CM

## 2023-03-23 DIAGNOSIS — I10 PRIMARY HYPERTENSION: ICD-10-CM

## 2023-03-23 DIAGNOSIS — C78.7 METASTATIC COLON CANCER TO LIVER: ICD-10-CM

## 2023-03-23 DIAGNOSIS — C78.7 METASTATIC COLON CANCER TO LIVER: Primary | ICD-10-CM

## 2023-03-23 DIAGNOSIS — N17.9 AKI (ACUTE KIDNEY INJURY): ICD-10-CM

## 2023-03-23 DIAGNOSIS — R52 PAIN: ICD-10-CM

## 2023-03-23 DIAGNOSIS — E43 SEVERE MALNUTRITION: ICD-10-CM

## 2023-03-23 DIAGNOSIS — C18.9 METASTATIC COLON CANCER TO LIVER: Primary | ICD-10-CM

## 2023-03-23 DIAGNOSIS — R39.9 LOWER URINARY TRACT SYMPTOMS (LUTS): ICD-10-CM

## 2023-03-23 DIAGNOSIS — C18.9 METASTATIC COLON CANCER TO LIVER: ICD-10-CM

## 2023-03-23 DIAGNOSIS — R63.4 WEIGHT DECREASE: ICD-10-CM

## 2023-03-23 DIAGNOSIS — D84.821 IMMUNODEFICIENCY DUE TO CHEMOTHERAPY: ICD-10-CM

## 2023-03-23 DIAGNOSIS — K59.03 DRUG-INDUCED CONSTIPATION: ICD-10-CM

## 2023-03-23 DIAGNOSIS — D49.9 IMMUNODEFICIENCY SECONDARY TO NEOPLASM: ICD-10-CM

## 2023-03-23 DIAGNOSIS — T45.1X5A IMMUNODEFICIENCY DUE TO CHEMOTHERAPY: ICD-10-CM

## 2023-03-23 DIAGNOSIS — D64.81 ANEMIA ASSOCIATED WITH CHEMOTHERAPY: ICD-10-CM

## 2023-03-23 DIAGNOSIS — D84.81 IMMUNODEFICIENCY SECONDARY TO NEOPLASM: ICD-10-CM

## 2023-03-23 LAB
ABO + RH BLD: NORMAL
ALBUMIN SERPL BCP-MCNC: 2.6 G/DL (ref 3.5–5.2)
ALP SERPL-CCNC: 171 U/L (ref 55–135)
ALT SERPL W/O P-5'-P-CCNC: 9 U/L (ref 10–44)
ANION GAP SERPL CALC-SCNC: 10 MMOL/L (ref 8–16)
AST SERPL-CCNC: 25 U/L (ref 10–40)
BASOPHILS # BLD AUTO: 0.05 K/UL (ref 0–0.2)
BASOPHILS NFR BLD: 0.8 % (ref 0–1.9)
BILIRUB SERPL-MCNC: 0.8 MG/DL (ref 0.1–1)
BLD GP AB SCN CELLS X3 SERPL QL: NORMAL
BUN SERPL-MCNC: 9 MG/DL (ref 8–23)
CALCIUM SERPL-MCNC: 9.4 MG/DL (ref 8.7–10.5)
CEA SERPL-MCNC: 9 NG/ML (ref 0–5)
CHLORIDE SERPL-SCNC: 106 MMOL/L (ref 95–110)
CO2 SERPL-SCNC: 25 MMOL/L (ref 23–29)
CREAT SERPL-MCNC: 0.8 MG/DL (ref 0.5–1.4)
DIFFERENTIAL METHOD: ABNORMAL
EOSINOPHIL # BLD AUTO: 0.1 K/UL (ref 0–0.5)
EOSINOPHIL NFR BLD: 2.3 % (ref 0–8)
ERYTHROCYTE [DISTWIDTH] IN BLOOD BY AUTOMATED COUNT: 15.6 % (ref 11.5–14.5)
EST. GFR  (NO RACE VARIABLE): >60 ML/MIN/1.73 M^2
GLUCOSE SERPL-MCNC: 84 MG/DL (ref 70–110)
HCT VFR BLD AUTO: 30.8 % (ref 40–54)
HGB BLD-MCNC: 8.6 G/DL (ref 14–18)
IMM GRANULOCYTES # BLD AUTO: 0.01 K/UL (ref 0–0.04)
IMM GRANULOCYTES NFR BLD AUTO: 0.2 % (ref 0–0.5)
LYMPHOCYTES # BLD AUTO: 1.2 K/UL (ref 1–4.8)
LYMPHOCYTES NFR BLD: 19.8 % (ref 18–48)
MCH RBC QN AUTO: 23.6 PG (ref 27–31)
MCHC RBC AUTO-ENTMCNC: 27.9 G/DL (ref 32–36)
MCV RBC AUTO: 85 FL (ref 82–98)
MONOCYTES # BLD AUTO: 0.9 K/UL (ref 0.3–1)
MONOCYTES NFR BLD: 14.8 % (ref 4–15)
NEUTROPHILS # BLD AUTO: 3.7 K/UL (ref 1.8–7.7)
NEUTROPHILS NFR BLD: 62.1 % (ref 38–73)
NRBC BLD-RTO: 0 /100 WBC
PLATELET # BLD AUTO: 399 K/UL (ref 150–450)
PMV BLD AUTO: 9.5 FL (ref 9.2–12.9)
POTASSIUM SERPL-SCNC: 4.4 MMOL/L (ref 3.5–5.1)
PROT SERPL-MCNC: 7.4 G/DL (ref 6–8.4)
RBC # BLD AUTO: 3.64 M/UL (ref 4.6–6.2)
SODIUM SERPL-SCNC: 141 MMOL/L (ref 136–145)
SPECIMEN OUTDATE: NORMAL
WBC # BLD AUTO: 6.02 K/UL (ref 3.9–12.7)

## 2023-03-23 PROCEDURE — 1126F PR PAIN SEVERITY QUANTIFIED, NO PAIN PRESENT: ICD-10-PCS | Mod: CPTII,S$GLB,, | Performed by: INTERNAL MEDICINE

## 2023-03-23 PROCEDURE — 99999 PR PBB SHADOW E&M-EST. PATIENT-LVL III: ICD-10-PCS | Mod: PBBFAC,,, | Performed by: INTERNAL MEDICINE

## 2023-03-23 PROCEDURE — 80053 COMPREHEN METABOLIC PANEL: CPT | Performed by: INTERNAL MEDICINE

## 2023-03-23 PROCEDURE — 99214 OFFICE O/P EST MOD 30 MIN: CPT | Mod: S$GLB,,, | Performed by: INTERNAL MEDICINE

## 2023-03-23 PROCEDURE — 3008F PR BODY MASS INDEX (BMI) DOCUMENTED: ICD-10-PCS | Mod: CPTII,S$GLB,, | Performed by: INTERNAL MEDICINE

## 2023-03-23 PROCEDURE — 3288F FALL RISK ASSESSMENT DOCD: CPT | Mod: CPTII,S$GLB,, | Performed by: INTERNAL MEDICINE

## 2023-03-23 PROCEDURE — 1159F PR MEDICATION LIST DOCUMENTED IN MEDICAL RECORD: ICD-10-PCS | Mod: CPTII,S$GLB,, | Performed by: INTERNAL MEDICINE

## 2023-03-23 PROCEDURE — 86900 BLOOD TYPING SEROLOGIC ABO: CPT | Performed by: REGISTERED NURSE

## 2023-03-23 PROCEDURE — 3078F PR MOST RECENT DIASTOLIC BLOOD PRESSURE < 80 MM HG: ICD-10-PCS | Mod: CPTII,S$GLB,, | Performed by: INTERNAL MEDICINE

## 2023-03-23 PROCEDURE — 99999 PR PBB SHADOW E&M-EST. PATIENT-LVL III: CPT | Mod: PBBFAC,,, | Performed by: INTERNAL MEDICINE

## 2023-03-23 PROCEDURE — 82378 CARCINOEMBRYONIC ANTIGEN: CPT | Performed by: INTERNAL MEDICINE

## 2023-03-23 PROCEDURE — 1126F AMNT PAIN NOTED NONE PRSNT: CPT | Mod: CPTII,S$GLB,, | Performed by: INTERNAL MEDICINE

## 2023-03-23 PROCEDURE — 3008F BODY MASS INDEX DOCD: CPT | Mod: CPTII,S$GLB,, | Performed by: INTERNAL MEDICINE

## 2023-03-23 PROCEDURE — 3288F PR FALLS RISK ASSESSMENT DOCUMENTED: ICD-10-PCS | Mod: CPTII,S$GLB,, | Performed by: INTERNAL MEDICINE

## 2023-03-23 PROCEDURE — 85025 COMPLETE CBC W/AUTO DIFF WBC: CPT | Performed by: INTERNAL MEDICINE

## 2023-03-23 PROCEDURE — 3074F PR MOST RECENT SYSTOLIC BLOOD PRESSURE < 130 MM HG: ICD-10-PCS | Mod: CPTII,S$GLB,, | Performed by: INTERNAL MEDICINE

## 2023-03-23 PROCEDURE — 3078F DIAST BP <80 MM HG: CPT | Mod: CPTII,S$GLB,, | Performed by: INTERNAL MEDICINE

## 2023-03-23 PROCEDURE — 1101F PT FALLS ASSESS-DOCD LE1/YR: CPT | Mod: CPTII,S$GLB,, | Performed by: INTERNAL MEDICINE

## 2023-03-23 PROCEDURE — 3074F SYST BP LT 130 MM HG: CPT | Mod: CPTII,S$GLB,, | Performed by: INTERNAL MEDICINE

## 2023-03-23 PROCEDURE — 1101F PR PT FALLS ASSESS DOC 0-1 FALLS W/OUT INJ PAST YR: ICD-10-PCS | Mod: CPTII,S$GLB,, | Performed by: INTERNAL MEDICINE

## 2023-03-23 PROCEDURE — 99214 PR OFFICE/OUTPT VISIT, EST, LEVL IV, 30-39 MIN: ICD-10-PCS | Mod: S$GLB,,, | Performed by: INTERNAL MEDICINE

## 2023-03-23 PROCEDURE — 1159F MED LIST DOCD IN RCRD: CPT | Mod: CPTII,S$GLB,, | Performed by: INTERNAL MEDICINE

## 2023-03-23 NOTE — PROGRESS NOTES
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 Bonilla  12/30/2022: Y-90 to R liver  1/27/2023: Y-90 to R liver    Oncological History copied from medical chart:   Mr. Downs is a 72 y.o. male   70 years old pleasant AA gentleman, with history of hypertension, presenting with two weeks duration of abdominal cramps, diarrhea, nausea and vomiting. His symptoms started gradually with intermittent generalize crampy abdominal pain, worse with food. That was associated with nausea, reflux and occaisonal vomiting. He tried pepto-bismol and imodium with no relief, and hence he presented to ER.   He lost significant amount of weight, but cannot quantify the amount or the time period. He has also been feeling weaker than usual.   He hasn't seen a healthcare provider in over than 10 years. He has never had a colonoscopy.   In the ER. His labs were significant of microcytic anemia of 9.7 g/dl, MCV 77, and Platelets counts of 495. CT of the abdomen and pelvis showed " Large mass in the transverse colon highly suspicious for adenocarcinoma resulting in at least high-grade partial obstruction with dilation of proximal colon and small bowel. Soft tissue nodules in the adjacent mesentery are suspicious for pily metastases and/or mesenteric implants.  Numerous hepatic masses " "measuring up to 11.2 cm most likely related to metastatic disease.  There also several small subcapsular lesions which could reflect additional metastatic disease"   CT chest was negative to metastatic disease.   He underwent colonoscopy and stent placement. 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 HER 2 negative, VIRI/SHERI NGS was cancelled due to insufficient quantity   Guardant 360 was sent, negative for KRAS, NRAS, BRAF     He started palliative chemotherapy with FOLFOX+Pema     Interval History:   He presents today with his daughter and sister for follow-up.  He is scheduled for Y-90 mapping on 4/5/23.  Having some continued RUQ discomfort but no worsening or new pain.  Bowels are moving well, energy and appetite are good.   No other complaints today.      ECOG status is 1.       Past Medical History:   Past Medical History:   Diagnosis Date    MARIA A (acute kidney injury) 8/18/2022    Hypertension     Metastatic colon cancer to liver 4/22/2021       Past Surgical HIstory:   Past Surgical History:   Procedure Laterality Date    COLONOSCOPY N/A 4/20/2021    Procedure: COLONOSCOPY with possible stent;  Surgeon: EDILMA Bonilla MD;  Location: [facility] ([facility]  Service: Colon and Rectal;  Laterality: N/A;    DIAGNOSTIC LAPAROSCOPY N/A 9/7/2022    Procedure: LAPAROSCOPY, DIAGNOSTIC;  Surgeon: Adrian Rodriguez MD;  [facility];  Service: General;  Laterality: N/A;    INSERTION OF TUNNELED CENTRAL VENOUS CATHETER (CVC) WITH SUBCUTANEOUS PORT N/A 5/3/2021    Procedure: [id number];  Surgeon: Francisco Layton MD;  [facility];  Service: Vascular;  Laterality: N/A;    OMENTECTOMY N/A 10/20/2022    Procedure: OMENTECTOMY;  Surgeon: EDILMA Bonilla MD;  [facility];  Service: Colon and Rectal;  Laterality: N/A;    RIGHT " HEMICOLECTOMY N/A 10/20/2022    Procedure: HEMICOLECTOMY, RIGHT, extended;  Surgeon: EDILMA Bonilla MD;  Location: [facility] OR [address] [place] [address];  Service: Colon and Rectal;  Laterality: N/A;  Extended right hemicolectomy CONSENT IN AM       Family History: No family history on file.    Social History:  reports that he has never smoked. He has never used smokeless tobacco. He reports that he does not currently use alcohol. He reports that he does not use drugs.    Allergies:  Review of patient's allergies indicates:  No Known Allergies    Medications:  Current Outpatient Medications   Medication Sig Dispense Refill    acetaminophen (TYLENOL) 500 MG tablet Take 1 tablet (500 mg total) by mouth every 6 (six) hours as needed for Pain (alternate with ibuprofen). (Patient not taking: Reported on 3/8/2023)  0    amLODIPine (NORVASC) 10 MG tablet Take 1 tablet (10 mg total) by mouth once daily. 90 tablet 3    ibuprofen (ADVIL,MOTRIN) 400 MG tablet Take 1 tablet (400 mg total) by mouth every 6 (six) hours as needed for Other (pain). Alternate with tylenol (Patient not taking: Reported on 3/8/2023)      ondansetron (ZOFRAN) 4 MG tablet Take 1 tablet (4 mg total) by mouth every 8 (eight) hours as needed for Nausea. (Patient not taking: Reported on 3/8/2023) 30 tablet 3    oxyCODONE (ROXICODONE) 5 MG immediate release tablet Take 1 tablet (5 mg total) by mouth every 6 (six) hours as needed for Pain. (Patient not taking: Reported on 3/8/2023) 20 tablet 0    tamsulosin (FLOMAX) 0.4 mg Cap Take 1 capsule (0.4 mg total) by mouth once daily. 30 capsule 5     No current facility-administered medications for this visit.       Review of Systems   Constitutional:  Positive for fatigue. Negative for activity change, appetite change, chills, diaphoresis, fever and unexpected weight change.   HENT:  Negative for congestion, mouth sores, nosebleeds, postnasal drip, rhinorrhea, trouble swallowing and voice change.    Eyes:  Negative for pain and visual  "disturbance.   Respiratory:  Negative for cough, chest tightness, shortness of breath and wheezing.    Cardiovascular:  Negative for chest pain, palpitations and leg swelling.   Gastrointestinal:  Positive for abdominal pain. Negative for abdominal distention, blood in stool, constipation, diarrhea, nausea and vomiting.   Genitourinary:  Negative for difficulty urinating, dysuria, flank pain, frequency, hematuria and urgency.   Musculoskeletal:  Negative for arthralgias, back pain and myalgias.   Skin:  Negative for rash and wound.   Neurological:  Positive for numbness. Negative for dizziness, facial asymmetry, weakness, light-headedness and headaches.   Psychiatric/Behavioral:  Negative for agitation, behavioral problems, confusion, decreased concentration, dysphoric mood and sleep disturbance. The patient is not nervous/anxious.    All other systems reviewed and are negative.    ECOG Performance Status: 1     Objective:      Vitals:   Vitals:    03/23/23 0837   BP: 112/69   BP Location: Left arm   Patient Position: Sitting   BP Method: Small (Automatic)   Pulse: 83   Resp: 18   Temp: 97.7 °F (36.5 °C)   TempSrc: Oral   SpO2: 98%   Weight: 52.8 kg (116 lb 6.5 oz)   Height: 5' 7" (1.702 m)         Physical Exam  Vitals reviewed.   Constitutional:       General: He is not in acute distress.     Appearance: Normal appearance. He is not ill-appearing, toxic-appearing or diaphoretic.   HENT:      Head: Normocephalic and atraumatic.      Right Ear: External ear normal.      Left Ear: External ear normal.   Eyes:      General: No scleral icterus.     Extraocular Movements: Extraocular movements intact.      Conjunctiva/sclera: Conjunctivae normal.      Pupils: Pupils are equal, round, and reactive to light.   Cardiovascular:      Rate and Rhythm: Normal rate and regular rhythm.      Pulses: Normal pulses.      Heart sounds: Normal heart sounds. No murmur heard.  Pulmonary:      Effort: Pulmonary effort is normal. No " respiratory distress.      Breath sounds: Normal breath sounds. No wheezing.   Chest:      Comments: Port to RCW, no signs of infection.  Abdominal:      General: Abdomen is flat. Bowel sounds are normal. There is no distension.      Palpations: Abdomen is soft. There is no mass.      Tenderness: There is no abdominal tenderness.      Comments: Vertical midline abdominal wound well healed   Musculoskeletal:         General: No swelling or deformity. Normal range of motion.      Right lower leg: No edema.      Left lower leg: No edema.   Skin:     Coloration: Skin is not jaundiced or pale.      Findings: No bruising, erythema or rash.   Neurological:      General: No focal deficit present.      Mental Status: He is alert and oriented to person, place, and time. Mental status is at baseline.      Cranial Nerves: No cranial nerve deficit.      Sensory: No sensory deficit.      Gait: Gait normal.   Psychiatric:         Mood and Affect: Mood normal.         Behavior: Behavior normal.         Thought Content: Thought content normal.         Judgment: Judgment normal.     Laboratory Data:  Lab Visit on 03/23/2023   Component Date Value Ref Range Status    WBC 03/23/2023 6.02  3.90 - 12.70 K/uL Final    RBC 03/23/2023 3.64 (L)  4.60 - 6.20 M/uL Final    Hemoglobin 03/23/2023 8.6 (L)  14.0 - 18.0 g/dL Final    Hematocrit 03/23/2023 30.8 (L)  40.0 - 54.0 % Final    MCV 03/23/2023 85  82 - 98 fL Final    MCH 03/23/2023 23.6 (L)  27.0 - 31.0 pg Final    MCHC 03/23/2023 27.9 (L)  32.0 - 36.0 g/dL Final    RDW 03/23/2023 15.6 (H)  11.5 - 14.5 % Final    Platelets 03/23/2023 399  150 - 450 K/uL Final    MPV 03/23/2023 9.5  9.2 - 12.9 fL Final    Immature Granulocytes 03/23/2023 0.2  0.0 - 0.5 % Final    Gran # (ANC) 03/23/2023 3.7  1.8 - 7.7 K/uL Final    Immature Grans (Abs) 03/23/2023 0.01  0.00 - 0.04 K/uL Final    Comment: Mild elevation in immature granulocytes is non specific and   can be seen in a variety of conditions  including stress response,   acute inflammation, trauma and pregnancy. Correlation with other   laboratory and clinical findings is essential.      Lymph # 03/23/2023 1.2  1.0 - 4.8 K/uL Final    Mono # 03/23/2023 0.9  0.3 - 1.0 K/uL Final    Eos # 03/23/2023 0.1  0.0 - 0.5 K/uL Final    Baso # 03/23/2023 0.05  0.00 - 0.20 K/uL Final    nRBC 03/23/2023 0  0 /100 WBC Final    Gran % 03/23/2023 62.1  38.0 - 73.0 % Final    Lymph % 03/23/2023 19.8  18.0 - 48.0 % Final    Mono % 03/23/2023 14.8  4.0 - 15.0 % Final    Eosinophil % 03/23/2023 2.3  0.0 - 8.0 % Final    Basophil % 03/23/2023 0.8  0.0 - 1.9 % Final    Differential Method 03/23/2023 Automated   Final    Sodium 03/23/2023 141  136 - 145 mmol/L Final    Potassium 03/23/2023 4.4  3.5 - 5.1 mmol/L Final    Chloride 03/23/2023 106  95 - 110 mmol/L Final    CO2 03/23/2023 25  23 - 29 mmol/L Final    Glucose 03/23/2023 84  70 - 110 mg/dL Final    BUN 03/23/2023 9  8 - 23 mg/dL Final    Creatinine 03/23/2023 0.8  0.5 - 1.4 mg/dL Final    Calcium 03/23/2023 9.4  8.7 - 10.5 mg/dL Final    Total Protein 03/23/2023 7.4  6.0 - 8.4 g/dL Final    Albumin 03/23/2023 2.6 (L)  3.5 - 5.2 g/dL Final    Total Bilirubin 03/23/2023 0.8  0.1 - 1.0 mg/dL Final    Comment: For infants and newborns, interpretation of results should be based  on gestational age, weight and in agreement with clinical  observations.    Premature Infant recommended reference ranges:  Up to 24 hours.............<8.0 mg/dL  Up to 48 hours............<12.0 mg/dL  3-5 days..................<15.0 mg/dL  6-29 days.................<15.0 mg/dL      Alkaline Phosphatase 03/23/2023 171 (H)  55 - 135 U/L Final    AST 03/23/2023 25  10 - 40 U/L Final    ALT 03/23/2023 9 (L)  10 - 44 U/L Final    Anion Gap 03/23/2023 10  8 - 16 mmol/L Final    eGFR 03/23/2023 >60.0  >60 mL/min/1.73 m^2 Final    CEA 03/23/2023 9.0 (H)  0.0 - 5.0 ng/mL Final    Comment: CEA Normal Range:  Non-Smokers: 0-3.0 ng/mL  Smokers:     0-5.0  ng/mL    The testing method is a chemiluminescent microparticle immunoassay   manufactured by [employer] and performed on the [employer]   or   [employer] system. Values obtained with different assay manufacturers   for   methods may be different and cannot be used interchangeably.      Group & Rh 03/23/2023 A POS   Final    Indirect Yolie 03/23/2023 NEG   Final    Specimen Outdate 03/23/2023 03/26/2023 23:59   Final     Assessment and Plan        1. 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 Immunodeficiency secondary to neoplasm    4. Primary hypertension    5. MARIA A (acute kidney injury)    6. Anemia associated with chemotherapy    7. Pain    8. Drug-induced constipation    9. Weight decrease    10. Severe malnutrition    11. Lower urinary tract symptoms (LUTS)        1-3.  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  Guardant 360 was negative for BRAF, VIRI, HER2 amplification.   Pretreatment CEA 57.  We had a long and sonia discussion with him about his diagnosis. Unfortunately, the disease is not curable but remains treatable and he has good performance status.   Restaging scans after 7 cycles of FOLFOX + Pema showed significant reduction in colonic mass and liver mass.   we switched to maintenance as of cycle 8 with 5FU + Pema  Restaging scans from March 2022 show stable disease.   MRI on 7/18/22 showing hepatic progression of disease in the right hepatic lobe noted on the exam. CT CAP on 8/26 shows some mild progression in both liver metastases.    Discussed case at colorectal tumor board 8/31/22 and had a diagnostic lap performed by Dr. Rodriguez on 9/7 to assess for any occult peritoneal disease. Findings from the diagnostic lap were negative. Fluid from around from around the primary mass was sent for cytology that was negative for malignancy.   Underwent primary tumor resection on 10/20/22 with Dr. Bonilla.    Meanwhile his liver  mets had grown.  We discussed his case at [facility] conference with plans for short term Y-90 and then restarting chemotherapy prior to considering any surgical options to his liver metastases.  He underwent Y-90 delivery on 12/30/22. Tolerated well.   CT CAP on 1/23/23 reviewed at [facility] conference with good response to Y-90, no new lesions.  Underwent second Y-90 on 1/27/23. Can consider R hepatectomy pending follow-up imaging after Y-90.     Received cycle 42 of FOLFOX (omitted oxaliplatin again starting cycle 41 due to neuropathy) on 2/9/23.  Because of 5-FU shortage nationally, we have been holding chemotherapy since mid-February 2023.  If he needs to restart chemotherapy (I.e. no plans for surgical resection), will place him on capecitabine maintenance for duration of 5-FU shortage.     Discussed at colorectal liver mets tumor board 3/16/23.  Plan for repeat Y-90 and then consideration of surgical resection and he will meet with liver transplant team as well.  Scheduled for Y-90 mapping 4/4/23 with IR.     Hold chemotherapy for now.  Monitor CEA.  Will restart 5-FU or cape if CEA is starting to rise.    RTC in 4 weeks.    4. Hypertension   -- BP normal today.  -- taking Amlodipine .   -- Following with PCP.   -- encouraged him and his daughter to monitor BP and HR closely at home.     5. MARIA A  -- Improved renal function. Cr normal.   -- Monitor. Encouraged continued hydration.     6. Anemia  -- Hgb dropped from 9.4 to 7.6 at last visit.  Now improved > 8.  -- Has some worsened dyspnea.  Will check CT Chest.   -- No signs of bleeding. Platelets normal.     7-10. Neoplasm related pain, weight loss, constipation  -- Will continue with pain management at this time as pain control is improved.   -- Following with nutrition. Encouraged increased protein. Had been drinking Boost daily.  -- Continue Miralax daily for constipation.    11. Urinary hesitancy  -- Continue Flomax.  -- Reports improvement since starting  medication.     Patient is in agreement with the proposed treatment plan. All questions were answered to the patient's satisfaction. Pt knows to call clinic if anything is needed before the next clinic visit.    Bunny Schuster MD  Hematology/Oncology  [facility]      Route Chart for Scheduling    Med Onc Chart Routing      Follow up with physician 4 weeks. no chemo scheduling needed for now   Follow up with RACHEL    Infusion scheduling note    Injection scheduling note    Labs CBC, CMP and CEA   Scheduling:  Preferred lab:  Lab interval:     Imaging   CT chest in next week or so   Pharmacy appointment    Other referrals           Treatment Plan Information   OP COLORECTAL FOLFOX + BEVACIZUMAB Q2W   Torres Pantoja MD   Upcoming Treatment Dates - OP COLORECTAL FOLFOX + BEVACIZUMAB Q2W    2/24/2023       Chemotherapy       fluorouraciL in sodium chloride 0.9% 100 mL chemo infusion       Antiemetics       palonosetron (ALOXI) 0.25 mg with Dexamethasone (DECADRON) 12 mg in NS 50 mL IVPB  3/10/2023       Chemotherapy       fluorouraciL in sodium chloride 0.9% 100 mL chemo infusion       Antiemetics       palonosetron (ALOXI) 0.25 mg with Dexamethasone (DECADRON) 12 mg in NS 50 mL IVPB    Therapy Plan Information  IV Fluids  sodium chloride 0.9% bolus 1,000 mL 1,000 mL  1,000 mL, Intravenous, Every visit  Flushes  sodium chloride 0.9% flush 10 mL  10 mL, Intravenous, PRN  heparin, porcine (PF) 100 unit/mL injection flush 500 Units  500 Units, Intravenous, PRN

## 2023-03-23 NOTE — PROGRESS NOTES
[facility]     Colorectal Liver Metastasis Tumor Board Note      Date: 3/16/23    Case Overview: Mr. 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 He has received FOLFOX + dwight and Y-90. In 9/2022, he was restarted on FOLFOX. Since last discussion, he underwent primary tumor resection on 10/20/22 as well as Y-90 x 2 on 12/30/22 and 1/27/23. Holding chemo since mid-February 2023.    Participants: medical oncology, surgical oncology, colorectal surgery, transplant surgeons, interventional radiology, and oncology navigator     Imaging Reviewed: MRI Abdomen 3/23    Radiology Review: Extensive Y90 changes throughout the right hepatic lobe, which demonstrate a favorable appearance and corresponds to declining CEA.  Majority of signal irregularity likely reflects post-treatment change.  Planning for additional Y90 to the superior seg VIII aspect and inferior aspect of the tumor.    Orders/Referrals: refer to transplant     Board Recommendations: Continue with embolization scheduled on 4/4/23. Would likely benefit from right PVE in the future. Refer to transplant to initiate the workup/evaluation process. Plan to evaluate for resectability if not transplant candidate.

## 2023-03-27 DIAGNOSIS — C18.9 METASTATIC COLON CANCER TO LIVER: Primary | ICD-10-CM

## 2023-03-27 DIAGNOSIS — C78.7 METASTATIC COLON CANCER TO LIVER: Primary | ICD-10-CM

## 2023-03-29 ENCOUNTER — TELEPHONE (OUTPATIENT)
Dept: TRANSPLANT | Facility: CLINIC | Age: 73
End: 2023-03-29
Payer: MEDICARE

## 2023-03-29 NOTE — TELEPHONE ENCOUNTER
Referral received from Dr Rodriguez   Patient with colon cancer mets to the liver. .  ICD-10:    Metastatic colon cancer to liver  - Primary ICD-10-CM: C18.9, C78.7   ICD-9-CM: 153.9, 197.7     Referred for liver transplant for EVALUATION.    Referral completed and forwarded to [facility].  Insurance: Epic   PRIMARY:   ID:  Contact #     SECONDARY:   ID:  Contact #

## 2023-03-29 NOTE — TELEPHONE ENCOUNTER
----- Message from Dorita Art sent at 3/29/2023  7:18 AM CDT -----    Called and sp to pt daughter; appt melisa'ed for 4/3 with Dr. Sandoval.  .  ----- Message -----  From: Esther Lee, RN  Sent: 3/27/2023   3:18 PM CDT  To: [facility] Liver Referral Pool, [facility] Staff    The attached pt was discussed at [facility] conference and  referral has been placed to transplant. Please contact pt to schedule.    Thanks,  Esther, ELENIN, RN

## 2023-04-03 ENCOUNTER — OFFICE VISIT (OUTPATIENT)
Dept: HEPATOLOGY | Facility: CLINIC | Age: 73
End: 2023-04-03
Attending: INTERNAL MEDICINE
Payer: MEDICARE

## 2023-04-03 VITALS
SYSTOLIC BLOOD PRESSURE: 113 MMHG | OXYGEN SATURATION: 99 % | WEIGHT: 116.88 LBS | HEART RATE: 87 BPM | BODY MASS INDEX: 18.35 KG/M2 | DIASTOLIC BLOOD PRESSURE: 68 MMHG | RESPIRATION RATE: 18 BRPM | TEMPERATURE: 98 F | HEIGHT: 67 IN

## 2023-04-03 DIAGNOSIS — C18.9 COLON CANCER METASTASIZED TO LIVER: Primary | ICD-10-CM

## 2023-04-03 DIAGNOSIS — C78.7 COLON CANCER METASTASIZED TO LIVER: Primary | ICD-10-CM

## 2023-04-03 DIAGNOSIS — C78.7 METASTATIC COLON CANCER TO LIVER: ICD-10-CM

## 2023-04-03 DIAGNOSIS — R16.0 LIVER MASSES: ICD-10-CM

## 2023-04-03 DIAGNOSIS — C18.9 METASTATIC COLON CANCER TO LIVER: ICD-10-CM

## 2023-04-03 PROCEDURE — 1101F PT FALLS ASSESS-DOCD LE1/YR: CPT | Mod: CPTII,S$GLB,TXP, | Performed by: INTERNAL MEDICINE

## 2023-04-03 PROCEDURE — 3008F BODY MASS INDEX DOCD: CPT | Mod: CPTII,S$GLB,TXP, | Performed by: INTERNAL MEDICINE

## 2023-04-03 PROCEDURE — 3078F PR MOST RECENT DIASTOLIC BLOOD PRESSURE < 80 MM HG: ICD-10-PCS | Mod: CPTII,S$GLB,TXP, | Performed by: INTERNAL MEDICINE

## 2023-04-03 PROCEDURE — 99999 PR PBB SHADOW E&M-EST. PATIENT-LVL IV: ICD-10-PCS | Mod: PBBFAC,TXP,, | Performed by: INTERNAL MEDICINE

## 2023-04-03 PROCEDURE — 1126F PR PAIN SEVERITY QUANTIFIED, NO PAIN PRESENT: ICD-10-PCS | Mod: CPTII,S$GLB,TXP, | Performed by: INTERNAL MEDICINE

## 2023-04-03 PROCEDURE — 3074F SYST BP LT 130 MM HG: CPT | Mod: CPTII,S$GLB,TXP, | Performed by: INTERNAL MEDICINE

## 2023-04-03 PROCEDURE — 99205 OFFICE O/P NEW HI 60 MIN: CPT | Mod: S$GLB,TXP,, | Performed by: INTERNAL MEDICINE

## 2023-04-03 PROCEDURE — 3078F DIAST BP <80 MM HG: CPT | Mod: CPTII,S$GLB,TXP, | Performed by: INTERNAL MEDICINE

## 2023-04-03 PROCEDURE — 3008F PR BODY MASS INDEX (BMI) DOCUMENTED: ICD-10-PCS | Mod: CPTII,S$GLB,TXP, | Performed by: INTERNAL MEDICINE

## 2023-04-03 PROCEDURE — 1159F MED LIST DOCD IN RCRD: CPT | Mod: CPTII,S$GLB,TXP, | Performed by: INTERNAL MEDICINE

## 2023-04-03 PROCEDURE — 3074F PR MOST RECENT SYSTOLIC BLOOD PRESSURE < 130 MM HG: ICD-10-PCS | Mod: CPTII,S$GLB,TXP, | Performed by: INTERNAL MEDICINE

## 2023-04-03 PROCEDURE — 1159F PR MEDICATION LIST DOCUMENTED IN MEDICAL RECORD: ICD-10-PCS | Mod: CPTII,S$GLB,TXP, | Performed by: INTERNAL MEDICINE

## 2023-04-03 PROCEDURE — 99205 PR OFFICE/OUTPT VISIT, NEW, LEVL V, 60-74 MIN: ICD-10-PCS | Mod: S$GLB,TXP,, | Performed by: INTERNAL MEDICINE

## 2023-04-03 PROCEDURE — 3288F FALL RISK ASSESSMENT DOCD: CPT | Mod: CPTII,S$GLB,TXP, | Performed by: INTERNAL MEDICINE

## 2023-04-03 PROCEDURE — 1101F PR PT FALLS ASSESS DOC 0-1 FALLS W/OUT INJ PAST YR: ICD-10-PCS | Mod: CPTII,S$GLB,TXP, | Performed by: INTERNAL MEDICINE

## 2023-04-03 PROCEDURE — 1160F RVW MEDS BY RX/DR IN RCRD: CPT | Mod: CPTII,S$GLB,TXP, | Performed by: INTERNAL MEDICINE

## 2023-04-03 PROCEDURE — 99999 PR PBB SHADOW E&M-EST. PATIENT-LVL IV: CPT | Mod: PBBFAC,TXP,, | Performed by: INTERNAL MEDICINE

## 2023-04-03 PROCEDURE — 1126F AMNT PAIN NOTED NONE PRSNT: CPT | Mod: CPTII,S$GLB,TXP, | Performed by: INTERNAL MEDICINE

## 2023-04-03 PROCEDURE — 1160F PR REVIEW ALL MEDS BY PRESCRIBER/CLIN PHARMACIST DOCUMENTED: ICD-10-PCS | Mod: CPTII,S$GLB,TXP, | Performed by: INTERNAL MEDICINE

## 2023-04-03 PROCEDURE — 3288F PR FALLS RISK ASSESSMENT DOCUMENTED: ICD-10-PCS | Mod: CPTII,S$GLB,TXP, | Performed by: INTERNAL MEDICINE

## 2023-04-03 NOTE — PROGRESS NOTES
Transplant Hepatology  Liver Transplant Recipient Evaluation      Consultation started: 4/3/2023 at 8:57 AM     Original Referring Provider: Adrian Rodriguez  Current Corresponding Physician: Adrian PRIETO Native Liver Diagnosis: Secondary Hepatic Malignancy: Specify - colon cancer mets to liver    Reason for Visit: evaluation for liver transplant     Subjective:     Javier Downs is a 72 y.o. male with a history of colorectal cancer with Mets to the liver.  He was diagnosed in 2022 with colorectal cancer.  He has been treated primarily with palliative chemotherapy in the form of FOLFOX + Pema.  In October of 2022 he underwent a right hemicolectomy for removal of the primary tumor.  There was no evidence of peritoneal seeding.  For his liver Mets he is undergone radioembolization twice with plans for a 3rd radioembolization this week.  He is no prior history of chronic liver disease.  He does not drink alcohol.  There is no history of type 2 diabetes or heart disease.  Other than the right hemicolectomy he is had no previous abdominal surgery.  Does have intermittent right upper quadrant abdominal discomfort.  He does state that he has lost a bit of weight over the past 6 months.  It was hard to quantitate that.  His body mass index is 18.3.  Been discussed at tumor board for consideration of right hemicolectomy versus liver transplantation.    Review of Systems   Constitutional:  Positive for fatigue and unexpected weight change. Negative for activity change, appetite change and chills.   HENT:  Negative for congestion, facial swelling and tinnitus.    Eyes:  Negative for visual disturbance.   Respiratory:  Negative for cough, shortness of breath and wheezing.    Cardiovascular:  Negative for chest pain and palpitations.   Gastrointestinal:  Positive for abdominal pain. Negative for abdominal distention.   Genitourinary:  Negative for dysuria.   Musculoskeletal:  Negative for arthralgias, joint  swelling and myalgias.   Neurological:  Negative for syncope and headaches.   Hematological:  Does not bruise/bleed easily.   Psychiatric/Behavioral:  Negative for confusion.    Past Surgical History:   Procedure Laterality Date    COLONOSCOPY N/A 4/20/2021    Procedure: COLONOSCOPY with possible stent;  Surgeon: EDILMA Bonilla MD;  Location: [facility] ENDO (2ND FLR);  Service: Colon and Rectal;  Laterality: N/A;    DIAGNOSTIC LAPAROSCOPY N/A 9/7/2022    Procedure: LAPAROSCOPY, DIAGNOSTIC;  Surgeon: Adrian Rodriguez MD;  Location: [facility] OR 2ND FLR;  Service: General;  Laterality: N/A;    INSERTION OF TUNNELED CENTRAL VENOUS CATHETER (CVC) WITH SUBCUTANEOUS PORT N/A 5/3/2021    Procedure: [id number];  Surgeon: Francisco Layton MD;  Location: [facility] OR 2ND FLR;  Service: Vascular;  Laterality: N/A;    OMENTECTOMY N/A 10/20/2022    Procedure: OMENTECTOMY;  Surgeon: EDILMA Bonilla MD;  Location: [facility] OR 2ND FLR;  Service: Colon and Rectal;  Laterality: N/A;    RIGHT HEMICOLECTOMY N/A 10/20/2022    Procedure: HEMICOLECTOMY, RIGHT, extended;  Surgeon: EDILMA Bonilla MD;  Location: [facility] OR 2ND FLR;  Service: Colon and Rectal;  Laterality: N/A;  Extended right hemicolectomy CONSENT IN AM     Current Outpatient Medications   Medication Sig    amLODIPine (NORVASC) 10 MG tablet Take 1 tablet (10 mg total) by mouth once daily.    acetaminophen (TYLENOL) 500 MG tablet Take 1 tablet (500 mg total) by mouth every 6 (six) hours as needed for Pain (alternate with ibuprofen). (Patient not taking: Reported on 3/8/2023)    ibuprofen (ADVIL,MOTRIN) 400 MG tablet Take 1 tablet (400 mg total) by mouth every 6 (six) hours as needed for Other (pain). Alternate with tylenol (Patient not taking: Reported on 3/8/2023)    ondansetron (ZOFRAN) 4 MG tablet Take 1 tablet (4 mg total) by mouth every 8 (eight) hours as needed for Nausea. (Patient not taking: Reported on 3/8/2023)    oxyCODONE (ROXICODONE) 5 MG immediate release tablet Take  1 tablet (5 mg total) by mouth every 6 (six) hours as needed for Pain. (Patient not taking: Reported on 3/8/2023)    tamsulosin (FLOMAX) 0.4 mg Cap Take 1 capsule (0.4 mg total) by mouth once daily. (Patient not taking: Reported on 4/3/2023)     No current facility-administered medications for this visit.       Objective:   Physical Exam  Constitutional:       Appearance: He is well-developed.   Eyes:      General: No scleral icterus.  Cardiovascular:      Rate and Rhythm: Normal rate and regular rhythm.      Heart sounds: Normal heart sounds.   Pulmonary:      Effort: Pulmonary effort is normal. No respiratory distress.      Breath sounds: Normal breath sounds. No wheezing.   Abdominal:      General: Bowel sounds are normal. There is no distension.      Palpations: Abdomen is soft. There is no mass.      Tenderness: There is no abdominal tenderness. There is no rebound.       Musculoskeletal:         General: Normal range of motion.   Lymphadenopathy:      Cervical: No cervical adenopathy.   Skin:     General: Skin is warm and dry.   Neurological:      Mental Status: He is alert and oriented to person, place, and time.     MELD-Na score: 7 at 1/27/2023  9:52 AM  MELD score: 7 at 1/27/2023  9:52 AM  Calculated from:  Serum Creatinine: 0.7 mg/dL (Using min of 1 mg/dL) at 1/27/2023  9:52 AM  Serum Sodium: 140 mmol/L (Using max of 137 mmol/L) at 1/27/2023  9:52 AM  Total Bilirubin: 0.5 mg/dL (Using min of 1 mg/dL) at 1/27/2023  9:52 AM  INR(ratio): 1.1 at 1/27/2023  9:52 AM  Age: 72 years  Lab Results   Component Value Date    GLU 84 03/23/2023    BUN 9 03/23/2023    CREATININE 0.8 03/23/2023    CALCIUM 9.4 03/23/2023     03/23/2023    K 4.4 03/23/2023     03/23/2023    PROT 7.4 03/23/2023    CO2 25 03/23/2023    WBC 6.02 03/23/2023    RBC 3.64 (L) 03/23/2023    HGB 8.6 (L) 03/23/2023    HCT 30.8 (L) 03/23/2023    HCT 34 (L) 04/17/2021     03/23/2023     Lab Results   Component Value Date    CHOL 144  04/18/2021    TRIG 94 10/29/2022    HDL 34 (L) 04/18/2021    CHOLHDL 23.6 04/18/2021    TOTALCHOLEST 4.2 04/18/2021    ALBUMIN 2.6 (L) 03/23/2023    BILITOT 0.8 03/23/2023    AST 25 03/23/2023    ALT 9 (L) 03/23/2023    ALKPHOS 171 (H) 03/23/2023    MG 1.9 11/02/2022    LABPROT 11.8 01/27/2023    INR 1.1 01/27/2023    INR 3.3 07/07/2021       Diagnostics:     1. Colon cancer metastasized to liver    2. Metastatic colon cancer to liver    3. Liver masses        Transplant Hepatology - Candidacy   Assessment/Plan:     Transplant Candidacy: Javier Downs is a 72 y.o. male with a history of stage IV colorectal cancer with liver metastases.  I see no absolute medical contraindications to liver transplantation but I am a little concerned, that while his overall performance status is good there is some degree of frailty.  We will proceed with his transplant evaluation.  I will discuss his case with our multidisciplinary transplant selection committee.    Patient advised that it is recommended that all transplant candidates, and their close contacts and household members receive Covid vaccination.    Zhang Ansari MD         [facility] Patient Status  Functional Status: 80% - Normal activity with effort: some symptoms of disease  Physical Capacity: No Limitations    Patient on life support: No  Diabetes: No  Any previous malignancy: Yes, Other (colorectal cancer)  Neoadjuvant Therapy: yes  Has patient ever had a dx of HCC: no  Previous Abdominal Surgery: yes  Spontaneous Bacterial Peritonitis: no  History of Portal Vein Thrombosis: no  Transjugular Intrahepatic Portosystemic Shunt: no

## 2023-04-04 ENCOUNTER — LAB VISIT (OUTPATIENT)
Dept: LAB | Facility: HOSPITAL | Age: 73
End: 2023-04-04
Attending: FAMILY MEDICINE
Payer: MEDICARE

## 2023-04-04 ENCOUNTER — PATIENT MESSAGE (OUTPATIENT)
Dept: INTERVENTIONAL RADIOLOGY/VASCULAR | Facility: HOSPITAL | Age: 73
End: 2023-04-04

## 2023-04-04 DIAGNOSIS — C78.7 COLON CANCER METASTASIZED TO LIVER: ICD-10-CM

## 2023-04-04 DIAGNOSIS — C18.9 COLON CANCER METASTASIZED TO LIVER: ICD-10-CM

## 2023-04-04 LAB
ALBUMIN SERPL BCP-MCNC: 3.1 G/DL (ref 3.5–5.2)
ALP SERPL-CCNC: 190 U/L (ref 55–135)
ALT SERPL W/O P-5'-P-CCNC: 12 U/L (ref 10–44)
ANION GAP SERPL CALC-SCNC: 11 MMOL/L (ref 8–16)
AST SERPL-CCNC: 26 U/L (ref 10–40)
BASOPHILS # BLD AUTO: 0.03 K/UL (ref 0–0.2)
BASOPHILS NFR BLD: 0.4 % (ref 0–1.9)
BILIRUB DIRECT SERPL-MCNC: 0.3 MG/DL (ref 0.1–0.3)
BILIRUB SERPL-MCNC: 0.7 MG/DL (ref 0.1–1)
BUN SERPL-MCNC: 10 MG/DL (ref 8–23)
CALCIUM SERPL-MCNC: 10.1 MG/DL (ref 8.7–10.5)
CHLORIDE SERPL-SCNC: 104 MMOL/L (ref 95–110)
CO2 SERPL-SCNC: 23 MMOL/L (ref 23–29)
CREAT SERPL-MCNC: 0.9 MG/DL (ref 0.5–1.4)
DIFFERENTIAL METHOD: ABNORMAL
EOSINOPHIL # BLD AUTO: 0.3 K/UL (ref 0–0.5)
EOSINOPHIL NFR BLD: 4.3 % (ref 0–8)
ERYTHROCYTE [DISTWIDTH] IN BLOOD BY AUTOMATED COUNT: 15.9 % (ref 11.5–14.5)
EST. GFR  (NO RACE VARIABLE): >60 ML/MIN/1.73 M^2
GLUCOSE SERPL-MCNC: 152 MG/DL (ref 70–110)
HCT VFR BLD AUTO: 33.8 % (ref 40–54)
HGB BLD-MCNC: 9.6 G/DL (ref 14–18)
IMM GRANULOCYTES # BLD AUTO: 0.02 K/UL (ref 0–0.04)
IMM GRANULOCYTES NFR BLD AUTO: 0.3 % (ref 0–0.5)
INR PPP: 1.1 (ref 0.8–1.2)
LYMPHOCYTES # BLD AUTO: 1.5 K/UL (ref 1–4.8)
LYMPHOCYTES NFR BLD: 21.2 % (ref 18–48)
MCH RBC QN AUTO: 24.1 PG (ref 27–31)
MCHC RBC AUTO-ENTMCNC: 28.4 G/DL (ref 32–36)
MCV RBC AUTO: 85 FL (ref 82–98)
MONOCYTES # BLD AUTO: 0.7 K/UL (ref 0.3–1)
MONOCYTES NFR BLD: 10.1 % (ref 4–15)
NEUTROPHILS # BLD AUTO: 4.4 K/UL (ref 1.8–7.7)
NEUTROPHILS NFR BLD: 63.7 % (ref 38–73)
NRBC BLD-RTO: 0 /100 WBC
PLATELET # BLD AUTO: 339 K/UL (ref 150–450)
PMV BLD AUTO: 9.9 FL (ref 9.2–12.9)
POTASSIUM SERPL-SCNC: 3.9 MMOL/L (ref 3.5–5.1)
PROT SERPL-MCNC: 8.5 G/DL (ref 6–8.4)
PROTHROMBIN TIME: 11.6 SEC (ref 9–12.5)
RBC # BLD AUTO: 3.99 M/UL (ref 4.6–6.2)
SODIUM SERPL-SCNC: 138 MMOL/L (ref 136–145)
WBC # BLD AUTO: 6.93 K/UL (ref 3.9–12.7)

## 2023-04-04 PROCEDURE — 85025 COMPLETE CBC W/AUTO DIFF WBC: CPT | Mod: TXP | Performed by: FAMILY MEDICINE

## 2023-04-04 PROCEDURE — 80053 COMPREHEN METABOLIC PANEL: CPT | Mod: TXP | Performed by: FAMILY MEDICINE

## 2023-04-04 PROCEDURE — 85610 PROTHROMBIN TIME: CPT | Mod: NTX | Performed by: FAMILY MEDICINE

## 2023-04-04 PROCEDURE — 82248 BILIRUBIN DIRECT: CPT | Mod: TXP | Performed by: FAMILY MEDICINE

## 2023-04-04 PROCEDURE — 36415 COLL VENOUS BLD VENIPUNCTURE: CPT | Mod: TXP | Performed by: FAMILY MEDICINE

## 2023-04-05 ENCOUNTER — HOSPITAL ENCOUNTER (OUTPATIENT)
Dept: RADIOLOGY | Facility: HOSPITAL | Age: 73
Discharge: HOME | End: 2023-04-05
Attending: FAMILY MEDICINE
Payer: MEDICARE

## 2023-04-05 ENCOUNTER — HOSPITAL ENCOUNTER (OUTPATIENT)
Dept: INTERVENTIONAL RADIOLOGY/VASCULAR | Facility: HOSPITAL | Age: 73
Discharge: HOME | End: 2023-04-05
Attending: FAMILY MEDICINE | Admitting: RADIOLOGY
Payer: MEDICARE

## 2023-04-05 VITALS
HEART RATE: 75 BPM | DIASTOLIC BLOOD PRESSURE: 54 MMHG | OXYGEN SATURATION: 100 % | SYSTOLIC BLOOD PRESSURE: 96 MMHG | TEMPERATURE: 98 F | BODY MASS INDEX: 18.21 KG/M2 | RESPIRATION RATE: 18 BRPM | WEIGHT: 116 LBS | HEIGHT: 67 IN

## 2023-04-05 DIAGNOSIS — C18.9 COLON CANCER METASTASIZED TO LIVER: ICD-10-CM

## 2023-04-05 DIAGNOSIS — C78.7 COLON CANCER METASTASIZED TO LIVER: ICD-10-CM

## 2023-04-05 PROCEDURE — 75887 VEIN X-RAY LIVER W/O HEMODYN: CPT | Mod: 26,NTX,, | Performed by: RADIOLOGY

## 2023-04-05 PROCEDURE — 36247 IR EMBOLIZATION COMP FOR TUMOR_ORGAN ISCHEMIA_INFARC: ICD-10-PCS | Mod: NTX,,, | Performed by: RADIOLOGY

## 2023-04-05 PROCEDURE — 78830 RP LOCLZJ TUM SPECT W/CT 1: CPT | Mod: TC,NTX

## 2023-04-05 PROCEDURE — 75887 PR  PERCUT XHEPATIC PORTOGRAM: ICD-10-PCS | Mod: 26,NTX,, | Performed by: RADIOLOGY

## 2023-04-05 PROCEDURE — 77290 THER RAD SIMULAJ FIELD CPLX: CPT | Mod: TC,TXP | Performed by: RADIOLOGY

## 2023-04-05 PROCEDURE — 78830 NM SPECT/CT SINGLE AREA: ICD-10-PCS | Mod: 26,NTX,, | Performed by: RADIOLOGY

## 2023-04-05 PROCEDURE — 78201 LIVER IMAGING STATIC ONLY: CPT | Mod: 26,59,NTX, | Performed by: RADIOLOGY

## 2023-04-05 PROCEDURE — 76377 PR  3D RENDERING W/ IMAGE POSTPROCESS: ICD-10-PCS | Mod: 26,59,NTX, | Performed by: RADIOLOGY

## 2023-04-05 PROCEDURE — 75726 CHG ANGIO VISCERAL SELECTV/SUBSELEC: ICD-10-PCS | Mod: 26,NTX,, | Performed by: RADIOLOGY

## 2023-04-05 PROCEDURE — 76937 US GUIDE VASCULAR ACCESS: CPT | Mod: TC,NTX | Performed by: RADIOLOGY

## 2023-04-05 PROCEDURE — 75726 ARTERY X-RAYS ABDOMEN: CPT | Mod: 26,NTX,, | Performed by: RADIOLOGY

## 2023-04-05 PROCEDURE — 36248 PR PR INS CATH ABD/L-EXT ART ADDL 2ND ORD/3RD ORD/BYD: ICD-10-PCS | Mod: NTX,,, | Performed by: RADIOLOGY

## 2023-04-05 PROCEDURE — 75887 VEIN X-RAY LIVER W/O HEMODYN: CPT | Mod: TC,TXP | Performed by: RADIOLOGY

## 2023-04-05 PROCEDURE — 25000003 PHARM REV CODE 250: Mod: TXP | Performed by: STUDENT IN AN ORGANIZED HEALTH CARE EDUCATION/TRAINING PROGRAM

## 2023-04-05 PROCEDURE — 99152 MOD SED SAME PHYS/QHP 5/>YRS: CPT | Mod: TXP | Performed by: RADIOLOGY

## 2023-04-05 PROCEDURE — 76380 PR  CT SCAN,LIMITED/LOCALIZED F/U STUDY: ICD-10-PCS | Mod: 26,NTX,, | Performed by: RADIOLOGY

## 2023-04-05 PROCEDURE — 76380 CAT SCAN FOLLOW-UP STUDY: CPT | Mod: TC,NTX | Performed by: RADIOLOGY

## 2023-04-05 PROCEDURE — 76937 US GUIDE VASCULAR ACCESS: CPT | Mod: 26,NTX,, | Performed by: RADIOLOGY

## 2023-04-05 PROCEDURE — G0269 OCCLUSIVE DEVICE IN VEIN ART: HCPCS | Mod: NTX | Performed by: RADIOLOGY

## 2023-04-05 PROCEDURE — 75774 ARTERY X-RAY EACH VESSEL: CPT | Mod: 26,NTX,, | Performed by: RADIOLOGY

## 2023-04-05 PROCEDURE — 63600175 PHARM REV CODE 636 W HCPCS: Mod: NTX | Performed by: RADIOLOGY

## 2023-04-05 PROCEDURE — 76937 PR  US GUIDE, VASCULAR ACCESS: ICD-10-PCS | Mod: 26,NTX,, | Performed by: RADIOLOGY

## 2023-04-05 PROCEDURE — 25500020 PHARM REV CODE 255: Mod: NTX | Performed by: RADIOLOGY

## 2023-04-05 PROCEDURE — 78201 LIVER IMAGING STATIC ONLY: CPT | Mod: TC,NTX

## 2023-04-05 PROCEDURE — 76380 CAT SCAN FOLLOW-UP STUDY: CPT | Mod: 26,NTX,, | Performed by: RADIOLOGY

## 2023-04-05 PROCEDURE — 75774 PR  ANGIO EA ADDNL SELECTV VESSEL: ICD-10-PCS | Mod: 26,NTX,, | Performed by: RADIOLOGY

## 2023-04-05 PROCEDURE — 36248 INS CATH ABD/L-EXT ART ADDL: CPT | Mod: NTX,,, | Performed by: RADIOLOGY

## 2023-04-05 PROCEDURE — C1887 CATHETER, GUIDING: HCPCS | Mod: TXP

## 2023-04-05 PROCEDURE — 76377 3D RENDER W/INTRP POSTPROCES: CPT | Mod: TC,NTX,59 | Performed by: RADIOLOGY

## 2023-04-05 PROCEDURE — 76377 3D RENDER W/INTRP POSTPROCES: CPT | Mod: 26,59,NTX, | Performed by: RADIOLOGY

## 2023-04-05 PROCEDURE — 63600175 PHARM REV CODE 636 W HCPCS: Mod: NTX | Performed by: STUDENT IN AN ORGANIZED HEALTH CARE EDUCATION/TRAINING PROGRAM

## 2023-04-05 PROCEDURE — 36247 INS CATH ABD/L-EXT ART 3RD: CPT | Mod: NTX,RT | Performed by: RADIOLOGY

## 2023-04-05 PROCEDURE — 99153 MOD SED SAME PHYS/QHP EA: CPT | Mod: TXP | Performed by: RADIOLOGY

## 2023-04-05 PROCEDURE — 75726 ARTERY X-RAYS ABDOMEN: CPT | Mod: TC,NTX | Performed by: RADIOLOGY

## 2023-04-05 PROCEDURE — 77290 THER RAD SIMULAJ FIELD CPLX: CPT | Mod: 26,NTX,, | Performed by: RADIOLOGY

## 2023-04-05 PROCEDURE — 78830 RP LOCLZJ TUM SPECT W/CT 1: CPT | Mod: 26,NTX,, | Performed by: RADIOLOGY

## 2023-04-05 PROCEDURE — 75774 ARTERY X-RAY EACH VESSEL: CPT | Mod: TC,TXP | Performed by: RADIOLOGY

## 2023-04-05 PROCEDURE — 77290 PR  SET RADN THERAPY FIELD COMPLEX: ICD-10-PCS | Mod: 26,NTX,, | Performed by: RADIOLOGY

## 2023-04-05 PROCEDURE — 36248 INS CATH ABD/L-EXT ART ADDL: CPT | Mod: NTX,RT | Performed by: RADIOLOGY

## 2023-04-05 PROCEDURE — 78201 NM LIVER IMAGING STATIC PRE Y-90 EMBOLIZATION: ICD-10-PCS | Mod: 26,59,NTX, | Performed by: RADIOLOGY

## 2023-04-05 RX ORDER — MIDAZOLAM HYDROCHLORIDE 1 MG/ML
INJECTION INTRAMUSCULAR; INTRAVENOUS
Status: COMPLETED | OUTPATIENT
Start: 2023-04-05 | End: 2023-04-05

## 2023-04-05 RX ORDER — LIDOCAINE HYDROCHLORIDE 10 MG/ML
INJECTION INFILTRATION; PERINEURAL
Status: COMPLETED | OUTPATIENT
Start: 2023-04-05 | End: 2023-04-05

## 2023-04-05 RX ORDER — HEPARIN SODIUM 200 [USP'U]/100ML
INJECTION, SOLUTION INTRAVENOUS
Status: COMPLETED | OUTPATIENT
Start: 2023-04-05 | End: 2023-04-05

## 2023-04-05 RX ORDER — FENTANYL CITRATE 50 UG/ML
INJECTION, SOLUTION INTRAMUSCULAR; INTRAVENOUS
Status: COMPLETED | OUTPATIENT
Start: 2023-04-05 | End: 2023-04-05

## 2023-04-05 RX ORDER — LIDOCAINE HYDROCHLORIDE 10 MG/ML
1 INJECTION, SOLUTION EPIDURAL; INFILTRATION; INTRACAUDAL; PERINEURAL ONCE AS NEEDED
Status: DISCONTINUED | OUTPATIENT
Start: 2023-04-05 | End: 2023-04-06 | Stop reason: HOSPADM

## 2023-04-05 RX ORDER — SODIUM CHLORIDE 9 MG/ML
INJECTION, SOLUTION INTRAVENOUS CONTINUOUS
Status: DISCONTINUED | OUTPATIENT
Start: 2023-04-05 | End: 2023-04-06 | Stop reason: HOSPADM

## 2023-04-05 RX ADMIN — LIDOCAINE HYDROCHLORIDE 3 ML: 10 INJECTION, SOLUTION INFILTRATION; PERINEURAL at 12:04

## 2023-04-05 RX ADMIN — FENTANYL CITRATE 25 MCG: 50 INJECTION, SOLUTION INTRAMUSCULAR; INTRAVENOUS at 12:04

## 2023-04-05 RX ADMIN — MIDAZOLAM HYDROCHLORIDE 0.5 MG: 1 INJECTION INTRAMUSCULAR; INTRAVENOUS at 12:04

## 2023-04-05 RX ADMIN — IOHEXOL 199 ML: 300 INJECTION, SOLUTION INTRAVENOUS at 02:04

## 2023-04-05 RX ADMIN — MIDAZOLAM HYDROCHLORIDE 0.5 MG: 1 INJECTION INTRAMUSCULAR; INTRAVENOUS at 02:04

## 2023-04-05 RX ADMIN — FENTANYL CITRATE 25 MCG: 50 INJECTION, SOLUTION INTRAMUSCULAR; INTRAVENOUS at 02:04

## 2023-04-05 RX ADMIN — HEPARIN SODIUM 1000 UNITS/HR: 200 INJECTION, SOLUTION INTRAVENOUS at 12:04

## 2023-04-05 NOTE — PLAN OF CARE
Received pt to floor from home accompanied by daughter.  AAO x 4. Denies pain or discomfort. Respirations even and unlabored. No distress noted. Pt stable.  Admit assessment complete. IV x 1 placed.  Pt oriented to room and call bell placed within reach.  Will continue to monitor.

## 2023-04-05 NOTE — DISCHARGE SUMMARY
Radiology Discharge Summary      Hospital Course: No complications    Admit Date: 4/5/2023  Discharge Date: 04/05/2023     Instructions Given to Patient: Yes  Diet: Resume prior diet  Activity: no lifting, Driving, or Strenuous exercise for 1 week.    Description of Condition on Discharge: Stable  Vital Signs (Most Recent): Temp: 98.2 °F (36.8 °C) (04/05/23 0954)  Pulse: 97 (04/05/23 1440)  Resp: 17 (04/05/23 1440)  BP: 92/61 (04/05/23 1440)  SpO2: 100 % (04/05/23 1440)    Discharge Disposition: Home    Discharge Diagnosis: Status post Y90 mapping study without immediate complication.    Follow Up: Patient will return in subsequent weeks for radioembolization.    Humphrey Kirkland MD  Fellow, Dept.Of Interventional Radiology  [facility]

## 2023-04-05 NOTE — PROCEDURES
IR POST PROCEDURE NOTE    Date of Procedure: 4/5/2023    Procedure: Hepatic angiogram, Y90 mapping with Tc99m-MAA administration    Pre-Procedure Diagnosis: Metastatic colorectal cancer    Post-Procedure Diagnosis: Same    Procedure Personnel: Raul Vidal MD and Humphrey Kirkland MD    Written Informed Consent Obtained: Yes    Specimen Removed: None    Estimated Blood Loss: Minimal    Findings: Hepatic angiogram and Y90 mapping study performed. 5 Fr Vascade at right groin access site.    Complications: None immediate.      Humphrey Kirkland MD  Fellow, Dept. Of Interventional Radiology  [facility]

## 2023-04-05 NOTE — NURSING
Yttrium mapping of liver tumor complete. Pt tolerated well. VSS. No signs or symptoms of distress noted.Vascade closure device in place. Hemostasis 1435. Pt to remain flat until 1440 Pt will be transferred to MPU bed after nuclear med scan complete. Report to RN on arrival.

## 2023-04-05 NOTE — H&P
Vascular and Interventional Radiology History & Physical    Date:  4/5/2023    Chief Complaint:   Metastatic colorectal cancer    History of Present Illness:  Javier Downs is a 72 y.o. male who presents for Y90 mapping and Tc99m-MAA administration.    Past Medical History:  Past Medical History:   Diagnosis Date    MARIA A (acute kidney injury) 8/18/2022    Hypertension     Metastatic colon cancer to liver 4/22/2021       Past Surgical History:  Past Surgical History:   Procedure Laterality Date    COLONOSCOPY N/A 4/20/2021    Procedure: COLONOSCOPY with possible stent;  Surgeon: EDILMA Bonilla MD;  Location: [facility] ENDO (2ND FLR);  Service: Colon and Rectal;  Laterality: N/A;    DIAGNOSTIC LAPAROSCOPY N/A 9/7/2022    Procedure: LAPAROSCOPY, DIAGNOSTIC;  Surgeon: Adrian Rodriguez MD;  Location: [facility] OR 2ND FLR;  Service: General;  Laterality: N/A;    INSERTION OF TUNNELED CENTRAL VENOUS CATHETER (CVC) WITH SUBCUTANEOUS PORT N/A 5/3/2021    Procedure: [id number];  Surgeon: Francisco Layton MD;  Location: [facility] OR 2ND FLR;  Service: Vascular;  Laterality: N/A;    OMENTECTOMY N/A 10/20/2022    Procedure: OMENTECTOMY;  Surgeon: EDILMA Bonilla MD;  Location: [facility] OR 2ND FLR;  Service: Colon and Rectal;  Laterality: N/A;    RIGHT HEMICOLECTOMY N/A 10/20/2022    Procedure: HEMICOLECTOMY, RIGHT, extended;  Surgeon: EDILMA Bonilla MD;  Location: [facility] OR 2ND FLR;  Service: Colon and Rectal;  Laterality: N/A;  Extended right hemicolectomy CONSENT IN AM        Sedation History:    Denies any adverse reactions.  Denies problems laying flat.    Social History:  Social History     Tobacco Use    Smoking status: Never    Smokeless tobacco: Never   Substance Use Topics    Alcohol use: Not Currently    Drug use: Never        Home Medications:   Prior to Admission medications    Medication Sig Start Date End Date Taking? Authorizing Provider   amLODIPine (NORVASC) 10 MG tablet Take 1 tablet (10 mg total) by mouth once  daily. 8/3/22 7/3/23 Yes Anali Hernandez PA-C   acetaminophen (TYLENOL) 500 MG tablet Take 1 tablet (500 mg total) by mouth every 6 (six) hours as needed for Pain (alternate with ibuprofen).  Patient not taking: Reported on 3/8/2023 5/3/21   FRANKLIN Delarosa MD   ibuprofen (ADVIL,MOTRIN) 400 MG tablet Take 1 tablet (400 mg total) by mouth every 6 (six) hours as needed for Other (pain). Alternate with tylenol  Patient not taking: Reported on 3/8/2023 5/3/21   FRANKLIN Delarosa MD   ondansetron (ZOFRAN) 4 MG tablet Take 1 tablet (4 mg total) by mouth every 8 (eight) hours as needed for Nausea.  Patient not taking: Reported on 3/8/2023 4/21/21   Thomas Monroe MD   oxyCODONE (ROXICODONE) 5 MG immediate release tablet Take 1 tablet (5 mg total) by mouth every 6 (six) hours as needed for Pain.  Patient not taking: Reported on 3/8/2023 11/2/22   Elsy Kelly NP   tamsulosin (FLOMAX) 0.4 mg Cap Take 1 capsule (0.4 mg total) by mouth once daily.  Patient not taking: Reported on 4/3/2023 11/16/22 11/16/23  Bunny Schuster MD       Inpatient Medications:    Current Outpatient Medications:     amLODIPine (NORVASC) 10 MG tablet, Take 1 tablet (10 mg total) by mouth once daily., Disp: 90 tablet, Rfl: 3    acetaminophen (TYLENOL) 500 MG tablet, Take 1 tablet (500 mg total) by mouth every 6 (six) hours as needed for Pain (alternate with ibuprofen). (Patient not taking: Reported on 3/8/2023), Disp: , Rfl: 0    ibuprofen (ADVIL,MOTRIN) 400 MG tablet, Take 1 tablet (400 mg total) by mouth every 6 (six) hours as needed for Other (pain). Alternate with tylenol (Patient not taking: Reported on 3/8/2023), Disp: , Rfl:     ondansetron (ZOFRAN) 4 MG tablet, Take 1 tablet (4 mg total) by mouth every 8 (eight) hours as needed for Nausea. (Patient not taking: Reported on 3/8/2023), Disp: 30 tablet, Rfl: 3    oxyCODONE (ROXICODONE) 5 MG immediate release tablet, Take 1 tablet (5 mg total) by mouth every 6 (six) hours as needed for  Pain. (Patient not taking: Reported on 3/8/2023), Disp: 20 tablet, Rfl: 0    tamsulosin (FLOMAX) 0.4 mg Cap, Take 1 capsule (0.4 mg total) by mouth once daily. (Patient not taking: Reported on 4/3/2023), Disp: 30 capsule, Rfl: 5    Current Facility-Administered Medications:     0.9%  NaCl infusion, , Intravenous, Continuous, Stephanie Johnson NP    LIDOcaine (PF) 10 mg/ml (1%) injection 10 mg, 1 mL, Intradermal, Once PRN, Stephanie Johnson NP     Anticoagulants/Antiplatelets:   no anticoagulation    Allergies:   Review of patient's allergies indicates:  No Known Allergies    Review of Systems:   As documented in primary provider H&P.    Vital Signs (Most Recent):  Temp: 98.2 °F (36.8 °C) (04/05/23 0954)  Pulse: 81 (04/05/23 0954)  Resp: 16 (04/05/23 0954)  BP: 95/62 (04/05/23 0955)  SpO2: 100 % (04/05/23 0954)    Physical Exam:  No acute distress, laying comfortably in bed, pleasant and cooperative  Regular rate and rhythm  Breathing unlabored  Abdomen benign  Extremities warm and well perfused    Sedation Exam:  ASA: III - Patient appears to have severe systemic disease not posing a constant threat to life   Mallampati: II (hard and soft palate, upper portion of tonsils anduvula visible)     Laboratory:  Lab Results   Component Value Date    INR 1.1 04/04/2023       Lab Results   Component Value Date    WBC 6.93 04/04/2023    HGB 9.6 (L) 04/04/2023    HCT 33.8 (L) 04/04/2023    MCV 85 04/04/2023     04/04/2023      Lab Results   Component Value Date     (H) 04/04/2023     04/04/2023    K 3.9 04/04/2023     04/04/2023    CO2 23 04/04/2023    BUN 10 04/04/2023    CREATININE 0.9 04/04/2023    CALCIUM 10.1 04/04/2023    MG 1.9 11/02/2022    ALT 12 04/04/2023    AST 26 04/04/2023    ALBUMIN 3.1 (L) 04/04/2023    BILITOT 0.7 04/04/2023    BILIDIR 0.3 04/04/2023       Imaging:  Reviewed.      ASSESSMENT/PLAN:   72M with metastatic CRC here for Y90 mapping study.     Procedure discussed in  detail with patient and family member at bedside. Risks including bleeding, infection, as well as damage to vasculature and surrounding structures were discussed. Informed consent was obtained.     Will plan for procedure with moderate intravenous conscious sedation.    Humphrey Kirkland MD  Fellow, Dept. Of Interventional Radiology  [facility]

## 2023-04-05 NOTE — DISCHARGE INSTRUCTIONS
Patient given the following discharge instructions post Y90 procedure.    No driving for 3 days.  No heavy lifting (gallon of milk) or strenuous exercise for 10 days.    Keep pregnant women 3 feet away for 3 days.  Keep pets away for 3 feet for 3 days.  Keep small children 3 feet away for 3 days.    For international flights, the radiation may set off the security scans.   Please call with any questions or concerns.      Monday thru Friday 8:00 am - 4:30 pm    Interventional Radiology   [phone]    After Hours    Ask for the Radiology Intern on call  [phone]

## 2023-04-05 NOTE — NURSING
Pt arrived to 189 for Yttrium mapping of liver tumor. Pt oriented to unit and staff. Plan of care reviewed with patient, patient verbalizes understanding. Comfort measures utilized. Pt safely transferred from stretcher to procedural table. Fall risk reviewed with patient, fall risk interventions maintained with direct observation/attendance.  Blankets applied. Pt prepped and draped utilizing standard sterile technique. Patient placed on continuous monitoring, as required by sedation policy. Timeouts completed utilizing standard universal time-out, per department and facility policy. RN to remain at bedside, continuous monitoring maintained. Pt resting comfortably. Denies pain/discomfort. Will continue to monitor. See flow sheets for monitoring, medication administration, and updates.

## 2023-04-06 ENCOUNTER — TELEPHONE (OUTPATIENT)
Dept: TRANSPLANT | Facility: CLINIC | Age: 73
End: 2023-04-06
Payer: MEDICARE

## 2023-04-06 NOTE — TELEPHONE ENCOUNTER
Financial clearance for liver transplant evaluation received. Patient and Carrie (dtr) notified. Evaluation process discussed, including initial appointment with a transplant surgeon. Understanding and agreement expressed. Appointment card completed for consult with Dr. Cutler or Dr. Christian at first available.

## 2023-04-10 ENCOUNTER — HOSPITAL ENCOUNTER (OUTPATIENT)
Dept: RADIOLOGY | Facility: HOSPITAL | Age: 73
Discharge: HOME | End: 2023-04-10
Attending: INTERNAL MEDICINE
Payer: MEDICARE

## 2023-04-10 DIAGNOSIS — C78.7 METASTATIC COLON CANCER TO LIVER: ICD-10-CM

## 2023-04-10 DIAGNOSIS — C18.9 METASTATIC COLON CANCER TO LIVER: ICD-10-CM

## 2023-04-10 PROCEDURE — 71250 CT CHEST WITHOUT CONTRAST: ICD-10-PCS | Mod: 26,NTX,, | Performed by: RADIOLOGY

## 2023-04-10 PROCEDURE — 71250 CT THORAX DX C-: CPT | Mod: 26,NTX,, | Performed by: RADIOLOGY

## 2023-04-10 PROCEDURE — 71250 CT THORAX DX C-: CPT | Mod: TC,TXP

## 2023-04-20 ENCOUNTER — OFFICE VISIT (OUTPATIENT)
Dept: HEMATOLOGY/ONCOLOGY | Facility: CLINIC | Age: 73
End: 2023-04-20
Payer: MEDICARE

## 2023-04-20 ENCOUNTER — SPECIALTY PHARMACY (OUTPATIENT)
Dept: PHARMACY | Facility: CLINIC | Age: 73
End: 2023-04-20
Payer: MEDICARE

## 2023-04-20 ENCOUNTER — LAB VISIT (OUTPATIENT)
Dept: LAB | Facility: HOSPITAL | Age: 73
End: 2023-04-20
Attending: INTERNAL MEDICINE
Payer: MEDICARE

## 2023-04-20 ENCOUNTER — OFFICE VISIT (OUTPATIENT)
Dept: TRANSPLANT | Facility: CLINIC | Age: 73
End: 2023-04-20
Payer: MEDICARE

## 2023-04-20 VITALS
WEIGHT: 118.19 LBS | RESPIRATION RATE: 18 BRPM | TEMPERATURE: 97 F | SYSTOLIC BLOOD PRESSURE: 127 MMHG | OXYGEN SATURATION: 100 % | DIASTOLIC BLOOD PRESSURE: 76 MMHG | HEART RATE: 71 BPM | HEIGHT: 67 IN | BODY MASS INDEX: 18.55 KG/M2

## 2023-04-20 VITALS
TEMPERATURE: 97 F | OXYGEN SATURATION: 100 % | SYSTOLIC BLOOD PRESSURE: 135 MMHG | RESPIRATION RATE: 16 BRPM | WEIGHT: 118.63 LBS | DIASTOLIC BLOOD PRESSURE: 76 MMHG | HEIGHT: 67 IN | HEART RATE: 71 BPM | BODY MASS INDEX: 18.62 KG/M2

## 2023-04-20 DIAGNOSIS — T45.1X5A ANEMIA ASSOCIATED WITH CHEMOTHERAPY: ICD-10-CM

## 2023-04-20 DIAGNOSIS — D84.81 IMMUNODEFICIENCY SECONDARY TO NEOPLASM: ICD-10-CM

## 2023-04-20 DIAGNOSIS — C18.9 METASTATIC COLON CANCER TO LIVER: Primary | ICD-10-CM

## 2023-04-20 DIAGNOSIS — Z76.82 LIVER TRANSPLANT CANDIDATE: ICD-10-CM

## 2023-04-20 DIAGNOSIS — C18.9 COLON CANCER METASTASIZED TO LIVER: Primary | ICD-10-CM

## 2023-04-20 DIAGNOSIS — D49.9 IMMUNODEFICIENCY SECONDARY TO NEOPLASM: ICD-10-CM

## 2023-04-20 DIAGNOSIS — C18.9 METASTATIC COLON CANCER TO LIVER: ICD-10-CM

## 2023-04-20 DIAGNOSIS — R39.9 LOWER URINARY TRACT SYMPTOMS (LUTS): ICD-10-CM

## 2023-04-20 DIAGNOSIS — C78.7 COLON CANCER METASTASIZED TO LIVER: Primary | ICD-10-CM

## 2023-04-20 DIAGNOSIS — N17.9 AKI (ACUTE KIDNEY INJURY): ICD-10-CM

## 2023-04-20 DIAGNOSIS — R63.4 WEIGHT DECREASE: ICD-10-CM

## 2023-04-20 DIAGNOSIS — I10 PRIMARY HYPERTENSION: ICD-10-CM

## 2023-04-20 DIAGNOSIS — Z79.899 IMMUNODEFICIENCY DUE TO CHEMOTHERAPY: ICD-10-CM

## 2023-04-20 DIAGNOSIS — K59.03 DRUG-INDUCED CONSTIPATION: ICD-10-CM

## 2023-04-20 DIAGNOSIS — E43 SEVERE MALNUTRITION: ICD-10-CM

## 2023-04-20 DIAGNOSIS — T45.1X5A IMMUNODEFICIENCY DUE TO CHEMOTHERAPY: ICD-10-CM

## 2023-04-20 DIAGNOSIS — C78.7 METASTATIC COLON CANCER TO LIVER: ICD-10-CM

## 2023-04-20 DIAGNOSIS — R52 PAIN: ICD-10-CM

## 2023-04-20 DIAGNOSIS — D84.821 IMMUNODEFICIENCY DUE TO CHEMOTHERAPY: ICD-10-CM

## 2023-04-20 DIAGNOSIS — D64.81 ANEMIA ASSOCIATED WITH CHEMOTHERAPY: ICD-10-CM

## 2023-04-20 DIAGNOSIS — C78.7 METASTATIC COLON CANCER TO LIVER: Primary | ICD-10-CM

## 2023-04-20 LAB
ALBUMIN SERPL BCP-MCNC: 3.3 G/DL (ref 3.5–5.2)
ALP SERPL-CCNC: 209 U/L (ref 55–135)
ALT SERPL W/O P-5'-P-CCNC: 15 U/L (ref 10–44)
ANION GAP SERPL CALC-SCNC: 13 MMOL/L (ref 8–16)
AST SERPL-CCNC: 30 U/L (ref 10–40)
BASOPHILS # BLD AUTO: 0.05 K/UL (ref 0–0.2)
BASOPHILS NFR BLD: 0.6 % (ref 0–1.9)
BILIRUB SERPL-MCNC: 0.7 MG/DL (ref 0.1–1)
BUN SERPL-MCNC: 10 MG/DL (ref 8–23)
CALCIUM SERPL-MCNC: 9.7 MG/DL (ref 8.7–10.5)
CEA SERPL-MCNC: 23.3 NG/ML (ref 0–5)
CHLORIDE SERPL-SCNC: 105 MMOL/L (ref 95–110)
CO2 SERPL-SCNC: 24 MMOL/L (ref 23–29)
CREAT SERPL-MCNC: 0.8 MG/DL (ref 0.5–1.4)
DIFFERENTIAL METHOD: ABNORMAL
EOSINOPHIL # BLD AUTO: 0.3 K/UL (ref 0–0.5)
EOSINOPHIL NFR BLD: 4.1 % (ref 0–8)
ERYTHROCYTE [DISTWIDTH] IN BLOOD BY AUTOMATED COUNT: 16.1 % (ref 11.5–14.5)
EST. GFR  (NO RACE VARIABLE): >60 ML/MIN/1.73 M^2
GLUCOSE SERPL-MCNC: 87 MG/DL (ref 70–110)
HCT VFR BLD AUTO: 34.1 % (ref 40–54)
HGB BLD-MCNC: 9.9 G/DL (ref 14–18)
IMM GRANULOCYTES # BLD AUTO: 0.02 K/UL (ref 0–0.04)
IMM GRANULOCYTES NFR BLD AUTO: 0.2 % (ref 0–0.5)
LYMPHOCYTES # BLD AUTO: 1.7 K/UL (ref 1–4.8)
LYMPHOCYTES NFR BLD: 20.8 % (ref 18–48)
MCH RBC QN AUTO: 23.9 PG (ref 27–31)
MCHC RBC AUTO-ENTMCNC: 29 G/DL (ref 32–36)
MCV RBC AUTO: 82 FL (ref 82–98)
MONOCYTES # BLD AUTO: 1 K/UL (ref 0.3–1)
MONOCYTES NFR BLD: 12.1 % (ref 4–15)
NEUTROPHILS # BLD AUTO: 5 K/UL (ref 1.8–7.7)
NEUTROPHILS NFR BLD: 62.2 % (ref 38–73)
NRBC BLD-RTO: 0 /100 WBC
PLATELET # BLD AUTO: 350 K/UL (ref 150–450)
PMV BLD AUTO: 10.5 FL (ref 9.2–12.9)
POTASSIUM SERPL-SCNC: 4.1 MMOL/L (ref 3.5–5.1)
PROT SERPL-MCNC: 8.5 G/DL (ref 6–8.4)
RBC # BLD AUTO: 4.15 M/UL (ref 4.6–6.2)
SODIUM SERPL-SCNC: 142 MMOL/L (ref 136–145)
WBC # BLD AUTO: 8.11 K/UL (ref 3.9–12.7)

## 2023-04-20 PROCEDURE — 1159F MED LIST DOCD IN RCRD: CPT | Mod: CPTII,S$GLB,, | Performed by: INTERNAL MEDICINE

## 2023-04-20 PROCEDURE — 3078F PR MOST RECENT DIASTOLIC BLOOD PRESSURE < 80 MM HG: ICD-10-PCS | Mod: CPTII,S$GLB,, | Performed by: INTERNAL MEDICINE

## 2023-04-20 PROCEDURE — 3078F DIAST BP <80 MM HG: CPT | Mod: CPTII,S$GLB,TXP, | Performed by: TRANSPLANT SURGERY

## 2023-04-20 PROCEDURE — 99203 OFFICE O/P NEW LOW 30 MIN: CPT | Mod: S$GLB,TXP,, | Performed by: TRANSPLANT SURGERY

## 2023-04-20 PROCEDURE — 1101F PT FALLS ASSESS-DOCD LE1/YR: CPT | Mod: CPTII,S$GLB,, | Performed by: INTERNAL MEDICINE

## 2023-04-20 PROCEDURE — 3008F PR BODY MASS INDEX (BMI) DOCUMENTED: ICD-10-PCS | Mod: CPTII,S$GLB,TXP, | Performed by: TRANSPLANT SURGERY

## 2023-04-20 PROCEDURE — 1101F PR PT FALLS ASSESS DOC 0-1 FALLS W/OUT INJ PAST YR: ICD-10-PCS | Mod: CPTII,S$GLB,, | Performed by: INTERNAL MEDICINE

## 2023-04-20 PROCEDURE — 1126F PR PAIN SEVERITY QUANTIFIED, NO PAIN PRESENT: ICD-10-PCS | Mod: CPTII,S$GLB,, | Performed by: INTERNAL MEDICINE

## 2023-04-20 PROCEDURE — 36415 COLL VENOUS BLD VENIPUNCTURE: CPT | Mod: TXP | Performed by: INTERNAL MEDICINE

## 2023-04-20 PROCEDURE — 82378 CARCINOEMBRYONIC ANTIGEN: CPT | Mod: TXP | Performed by: INTERNAL MEDICINE

## 2023-04-20 PROCEDURE — 85025 COMPLETE CBC W/AUTO DIFF WBC: CPT | Mod: TXP | Performed by: INTERNAL MEDICINE

## 2023-04-20 PROCEDURE — 3288F FALL RISK ASSESSMENT DOCD: CPT | Mod: CPTII,S$GLB,TXP, | Performed by: TRANSPLANT SURGERY

## 2023-04-20 PROCEDURE — 3288F PR FALLS RISK ASSESSMENT DOCUMENTED: ICD-10-PCS | Mod: CPTII,S$GLB,TXP, | Performed by: TRANSPLANT SURGERY

## 2023-04-20 PROCEDURE — 80053 COMPREHEN METABOLIC PANEL: CPT | Mod: TXP | Performed by: INTERNAL MEDICINE

## 2023-04-20 PROCEDURE — 99215 PR OFFICE/OUTPT VISIT, EST, LEVL V, 40-54 MIN: ICD-10-PCS | Mod: S$GLB,,, | Performed by: INTERNAL MEDICINE

## 2023-04-20 PROCEDURE — 3288F FALL RISK ASSESSMENT DOCD: CPT | Mod: CPTII,S$GLB,, | Performed by: INTERNAL MEDICINE

## 2023-04-20 PROCEDURE — 3075F SYST BP GE 130 - 139MM HG: CPT | Mod: CPTII,S$GLB,TXP, | Performed by: TRANSPLANT SURGERY

## 2023-04-20 PROCEDURE — 1126F AMNT PAIN NOTED NONE PRSNT: CPT | Mod: CPTII,S$GLB,, | Performed by: INTERNAL MEDICINE

## 2023-04-20 PROCEDURE — 1159F PR MEDICATION LIST DOCUMENTED IN MEDICAL RECORD: ICD-10-PCS | Mod: CPTII,S$GLB,, | Performed by: INTERNAL MEDICINE

## 2023-04-20 PROCEDURE — 99999 PR PBB SHADOW E&M-EST. PATIENT-LVL III: ICD-10-PCS | Mod: PBBFAC,TXP,,

## 2023-04-20 PROCEDURE — 1126F AMNT PAIN NOTED NONE PRSNT: CPT | Mod: CPTII,S$GLB,TXP, | Performed by: TRANSPLANT SURGERY

## 2023-04-20 PROCEDURE — 3078F DIAST BP <80 MM HG: CPT | Mod: CPTII,S$GLB,, | Performed by: INTERNAL MEDICINE

## 2023-04-20 PROCEDURE — 3075F PR MOST RECENT SYSTOLIC BLOOD PRESS GE 130-139MM HG: ICD-10-PCS | Mod: CPTII,S$GLB,TXP, | Performed by: TRANSPLANT SURGERY

## 2023-04-20 PROCEDURE — 1126F PR PAIN SEVERITY QUANTIFIED, NO PAIN PRESENT: ICD-10-PCS | Mod: CPTII,S$GLB,TXP, | Performed by: TRANSPLANT SURGERY

## 2023-04-20 PROCEDURE — 3008F BODY MASS INDEX DOCD: CPT | Mod: CPTII,S$GLB,, | Performed by: INTERNAL MEDICINE

## 2023-04-20 PROCEDURE — 3008F PR BODY MASS INDEX (BMI) DOCUMENTED: ICD-10-PCS | Mod: CPTII,S$GLB,, | Performed by: INTERNAL MEDICINE

## 2023-04-20 PROCEDURE — 1160F RVW MEDS BY RX/DR IN RCRD: CPT | Mod: CPTII,S$GLB,TXP, | Performed by: TRANSPLANT SURGERY

## 2023-04-20 PROCEDURE — 1101F PR PT FALLS ASSESS DOC 0-1 FALLS W/OUT INJ PAST YR: ICD-10-PCS | Mod: CPTII,S$GLB,TXP, | Performed by: TRANSPLANT SURGERY

## 2023-04-20 PROCEDURE — 1101F PT FALLS ASSESS-DOCD LE1/YR: CPT | Mod: CPTII,S$GLB,TXP, | Performed by: TRANSPLANT SURGERY

## 2023-04-20 PROCEDURE — 3078F PR MOST RECENT DIASTOLIC BLOOD PRESSURE < 80 MM HG: ICD-10-PCS | Mod: CPTII,S$GLB,TXP, | Performed by: TRANSPLANT SURGERY

## 2023-04-20 PROCEDURE — 99215 OFFICE O/P EST HI 40 MIN: CPT | Mod: S$GLB,,, | Performed by: INTERNAL MEDICINE

## 2023-04-20 PROCEDURE — 3008F BODY MASS INDEX DOCD: CPT | Mod: CPTII,S$GLB,TXP, | Performed by: TRANSPLANT SURGERY

## 2023-04-20 PROCEDURE — 1159F MED LIST DOCD IN RCRD: CPT | Mod: CPTII,S$GLB,TXP, | Performed by: TRANSPLANT SURGERY

## 2023-04-20 PROCEDURE — 1160F PR REVIEW ALL MEDS BY PRESCRIBER/CLIN PHARMACIST DOCUMENTED: ICD-10-PCS | Mod: CPTII,S$GLB,TXP, | Performed by: TRANSPLANT SURGERY

## 2023-04-20 PROCEDURE — 99999 PR PBB SHADOW E&M-EST. PATIENT-LVL III: ICD-10-PCS | Mod: PBBFAC,,, | Performed by: INTERNAL MEDICINE

## 2023-04-20 PROCEDURE — 3288F PR FALLS RISK ASSESSMENT DOCUMENTED: ICD-10-PCS | Mod: CPTII,S$GLB,, | Performed by: INTERNAL MEDICINE

## 2023-04-20 PROCEDURE — 3074F PR MOST RECENT SYSTOLIC BLOOD PRESSURE < 130 MM HG: ICD-10-PCS | Mod: CPTII,S$GLB,, | Performed by: INTERNAL MEDICINE

## 2023-04-20 PROCEDURE — 99999 PR PBB SHADOW E&M-EST. PATIENT-LVL III: CPT | Mod: PBBFAC,TXP,,

## 2023-04-20 PROCEDURE — 99203 PR OFFICE/OUTPT VISIT, NEW, LEVL III, 30-44 MIN: ICD-10-PCS | Mod: S$GLB,TXP,, | Performed by: TRANSPLANT SURGERY

## 2023-04-20 PROCEDURE — 99999 PR PBB SHADOW E&M-EST. PATIENT-LVL III: CPT | Mod: PBBFAC,,, | Performed by: INTERNAL MEDICINE

## 2023-04-20 PROCEDURE — 1159F PR MEDICATION LIST DOCUMENTED IN MEDICAL RECORD: ICD-10-PCS | Mod: CPTII,S$GLB,TXP, | Performed by: TRANSPLANT SURGERY

## 2023-04-20 PROCEDURE — 3074F SYST BP LT 130 MM HG: CPT | Mod: CPTII,S$GLB,, | Performed by: INTERNAL MEDICINE

## 2023-04-20 RX ORDER — CAPECITABINE 500 MG/1
TABLET, FILM COATED ORAL
Qty: 84 TABLET | Refills: 5 | Status: SHIPPED | OUTPATIENT
Start: 2023-04-20 | End: 2023-12-11

## 2023-04-20 NOTE — PROGRESS NOTES
Transplant Surgery  Liver Transplant Recipient Evaluation    Referring Provider: Adrian Rodriguez    Subjective:     Reason for Visit: evaluation for liver transplant    History of Present Illness: Javier Downs is a 72 y.o. male who is being evaluated for liver transplant due to Secondary Hepatic Malignancy: Specify - colon cancer mets to liver. SP right colectomy in 8-2022 with unresectable colon liver only mets. Has received treatment with Y-90 - chemotherapy. Stable disease. NO extrahepatic disease. Good performance .     External provider notes reviewed: Yes    Review of Systems  Objective:     Physical Exam  Vitals reviewed.   Constitutional:       General: He is awake.      Appearance: He is underweight.   Abdominal:       Neurological:      Mental Status: He is alert.   Psychiatric:         Behavior: Behavior is cooperative.       MELD-Na score: 7 at 4/4/2023  9:18 AM  MELD score: 7 at 4/4/2023  9:18 AM  Calculated from:  Serum Creatinine: 0.9 mg/dL (Using min of 1 mg/dL) at 4/4/2023  9:18 AM  Serum Sodium: 138 mmol/L (Using max of 137 mmol/L) at 4/4/2023  9:18 AM  Total Bilirubin: 0.7 mg/dL (Using min of 1 mg/dL) at 4/4/2023  9:18 AM  INR(ratio): 1.1 at 4/4/2023  9:18 AM  Age: 72 years    Diagnostics:  The following labs have been reviewed: CBC  BMP  The following radiology images have been independently reviewed and interpreted: CT Abd/Pelvis  CT Chest  Abdomen MRI     Diagnoses:  1. Colon cancer metastasized to liver    2. Liver transplant candidate        Transplant Surgery - Candidacy   Assessment/Plan:     Transplant Candidacy: Javier Downs is a 72 y.o. male with ESLD secondary to Secondary Hepatic Malignancy: Specify - colon cancer mets to liver here for evaluation for possible OLT.  Based on available information, Javier is a suitable liver transplant candidate.  He will be presented to selection committee after all tests and evaluations are complete.    He's underweight, able to carry out light  activity without assistance. ECOG 1-2     Additional testing to be completed according to Liver : Written Order Guideline for Adult Liver Transplant Evaluation (LI-02)    Interpretation of tests and discussion of patient management involves all members of the multidisciplinary transplant team.    Magan Cutler MD       [facility] Patient Status  Functional Status: 70% - Cares for self: unable to carry on normal activity or active work  Physical Capacity: No Limitations    Counseling: I discussed with Javier the benefits of liver transplantation.  We discussed the evaluation and listing procedures.  We discussed the MELD system and the associated waiting times.  We discussed national and center specific survival results.  We discussed the option of being multiply listed in different OPOs.  We discussed the option of living donation versus  donor transplantation and the advantages and relative disadvantages of each.   We discussed the risks, benefits and potential complications related to surgery including the risks related to anesthesia, bleeding, infection, primary non-function of the allograft, the risk of reoperation as well as the risk of death.  We discussed the typical post-operative course, length of hospitalization, the long-term use of immunosuppressive therapy as well as the need for long-term routine follow-up.    Patient advised that it is recommended that all transplant candidates, and their close contacts and household members receive Covid vaccination.    Coronavirus disease (COVID-19) caused by severe acute respiratory virus coronavirus 2 (SARS-C0V 2) is associated with increased mortality in solid organ transplant recipients (SOT) compared to non-transplant patients. Vaccine responses to vaccination are depressed against SARS-CoV2 compared to normal individuals but improve with third vaccination doses. Vaccination prior to SOT provides both the best opportunity for transplant candidates to  develop protective immunity and to reduce the risk of serious COVID19 infections post transplantation. Organ transplant candidates at [facility] will be required to receive SARS-CoV-2 vaccination prior to being listed with a an active status, whenever possible. Exceptions will be made for disability related reasons or for sincerely held Anabaptism beliefs.     PHS: I discussed the use of organs from donors with PHS risk criteria, including the testing protocols utilized, as well as data from the literature regarding the likelihood of transmission of hepatitis or HIV.  The patient is willing to consider such grafts.  DCD: I discussed the use of organs recovered by donation after cardiac death (DCD), including slightly decreased graft survival and greater risk of arterial and biliary complications. The potential advantage to the recipient is possibly receiving a transplant sooner by accepting such an organ. The patient is willing to consider such grafts.  HBcAb: I discussed the use of organs from donors with HBcAb in conjunction with long term use of HBV antiviral drugs, such as lamivudine. The small but measurable risk of hepatitis B seroconversion was discussed as well as the potentially life long need to continue antiviral drugs. The patient is willing to consider such grafts.  HCV Non-viremic recipient: I discussed the use of HCV-positive organs in naive recipients, including the risk of viral transmission to the patients or others, potential insurance barriers for antiviral medication coverage, risk for fibrosing cholestatic hepatitis, death or graft loss. The potential advantage to the recipient is the possibility of receiving a transplant sooner with decreased mortality risk by accepting such an organ. The patient is willing to consider such grafts.  LDLT: I discussed the nature of living donor liver transplant, including donor risks and more frequent recipient complications.  The patient is willing to consider such grafts.  Covid: I discussed that this donor has tested positive for the covid virus, but with very low levels of virus and no evidence of covid disease. Although the risk of transmission is unknown, we believe this donor is not infectious and use of the abdominal organs is safe.  To date, these organs have been used with no evidence of transmission. The patient is willing to consider such grafts.     Thalidomide Counseling: I discussed with the patient the risks of thalidomide including but not limited to birth defects, anxiety, weakness, chest pain, dizziness, cough and severe allergy.

## 2023-04-20 NOTE — PROGRESS NOTES
"[facility]  [facility]  Hematology/Medical Oncology Clinic     PATIENT: Javier Downs  MRN: [id number]  DATE: 4/20/2023    Diagnosis: Transverse colon metastatic cancer to the liver     Oncological history:   04/20/2021: metastatic colon cancer to the liver, moderately differentiated, pMMR  05/06/2021: C1 mFOLFOX + Pema  05/20/2021: C2 mFOLFOX + Pema  06/03/2021: C3 mFOLFOX + Pema   06/17/2021: C4 mFOLFOX + Pema  07/01/2021: C5 mFOLFOX + Pema  07/15/2021: C6 mFOLFOX + Pema  Restaging CT CAP: significant improvement of lesions   07/29/2021: C7 mFOLFOX + Pema   08/12/2021: Switched to maintenance  5FU+Pema (C8)  06/23/2022: C30 maintenance 5FU+Pema  10/20/2022: R hemicolectomy with Dr. Bonilla  12/30/2022: Y-90 to R liver  1/27/2023: Y-90 to R liver    Oncological History copied from medical chart:   Mr. Downs is a 72 y.o. male   70 years old pleasant AA gentleman, with history of hypertension, presenting with two weeks duration of abdominal cramps, diarrhea, nausea and vomiting. His symptoms started gradually with intermittent generalize crampy abdominal pain, worse with food. That was associated with nausea, reflux and occaisonal vomiting. He tried pepto-bismol and imodium with no relief, and hence he presented to ER.   He lost significant amount of weight, but cannot quantify the amount or the time period. He has also been feeling weaker than usual.   He hasn't seen a healthcare provider in over than 10 years. He has never had a colonoscopy.   In the ER. His labs were significant of microcytic anemia of 9.7 g/dl, MCV 77, and Platelets counts of 495. CT of the abdomen and pelvis showed " Large mass in the transverse colon highly suspicious for adenocarcinoma resulting in at least high-grade partial obstruction with dilation of proximal colon and small bowel. Soft tissue nodules in the adjacent mesentery are suspicious for pily metastases and/or mesenteric implants.  Numerous hepatic masses " "measuring up to 11.2 cm most likely related to metastatic disease.  There also several small subcapsular lesions which could reflect additional metastatic disease"   CT chest was negative to metastatic disease.   He underwent colonoscopy and stent placement. 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 HER 2 negative, VIRI/SHERI NGS was cancelled due to insufficient quantity   Guardant 360 was sent, negative for KRAS, NRAS, BRAF     He started palliative chemotherapy with FOLFOX+Pema     Interval History:   He presents today with his daughter and sister for follow-up.  He underwent Y-90 mapping on 4/5/23.  Not yet scheduled for delivery.  Meeting with transplant surgery today to discuss possibility of liver transplantation. No new pain.  Feels well with good energy and appetite.     ECOG status is 1.       Past Medical History:   Past Medical History:   Diagnosis Date    MARIA A (acute kidney injury) 8/18/2022    Hypertension     Metastatic colon cancer to liver 4/22/2021       Past Surgical HIstory:   Past Surgical History:   Procedure Laterality Date    COLONOSCOPY N/A 4/20/2021    Procedure: COLONOSCOPY with possible stent;  Surgeon: EDILMA Bonilla MD;  Location: [facility] (2ND FLR);  Service: Colon and Rectal;  Laterality: N/A;    DIAGNOSTIC LAPAROSCOPY N/A 9/7/2022    Procedure: LAPAROSCOPY, DIAGNOSTIC;  Surgeon: Adrian Rodriguez MD;  [facility];  Service: General;  Laterality: N/A;    INSERTION OF TUNNELED CENTRAL VENOUS CATHETER (CVC) WITH SUBCUTANEOUS PORT N/A 5/3/2021    Procedure: [id number];  Surgeon: Francisco Layton MD;  [facility];  Service: Vascular;  Laterality: N/A;    OMENTECTOMY N/A 10/20/2022    Procedure: OMENTECTOMY;  Surgeon: EDILMA Bonilla MD;  Location: [facility] OR [address] [place] [address];  Service: Colon and Rectal;  " Laterality: N/A;    RIGHT HEMICOLECTOMY N/A 10/20/2022    Procedure: HEMICOLECTOMY, RIGHT, extended;  Surgeon: EDILMA Bonilla MD;  Location: [facility] OR [address] [place] [address];  Service: Colon and Rectal;  Laterality: N/A;  Extended right hemicolectomy CONSENT IN AM       Family History: No family history on file.    Social History:  reports that he has never smoked. He has never used smokeless tobacco. He reports that he does not currently use alcohol. He reports that he does not use drugs.    Allergies:  Review of patient's allergies indicates:  No Known Allergies    Medications:  Current Outpatient Medications   Medication Sig Dispense Refill    acetaminophen (TYLENOL) 500 MG tablet Take 1 tablet (500 mg total) by mouth every 6 (six) hours as needed for Pain (alternate with ibuprofen). (Patient not taking: Reported on 3/8/2023)  0    amLODIPine (NORVASC) 10 MG tablet Take 1 tablet (10 mg total) by mouth once daily. 90 tablet 3    capecitabine (XELODA) 500 MG Tab Take 3 tabs by mouth twice daily with food on days 1 thru 7 of 14 day cycles.. 84 tablet 5    ibuprofen (ADVIL,MOTRIN) 400 MG tablet Take 1 tablet (400 mg total) by mouth every 6 (six) hours as needed for Other (pain). Alternate with tylenol (Patient not taking: Reported on 3/8/2023)      ondansetron (ZOFRAN) 4 MG tablet Take 1 tablet (4 mg total) by mouth every 8 (eight) hours as needed for Nausea. (Patient not taking: Reported on 3/8/2023) 30 tablet 3    oxyCODONE (ROXICODONE) 5 MG immediate release tablet Take 1 tablet (5 mg total) by mouth every 6 (six) hours as needed for Pain. (Patient not taking: Reported on 3/8/2023) 20 tablet 0    tamsulosin (FLOMAX) 0.4 mg Cap Take 1 capsule (0.4 mg total) by mouth once daily. (Patient not taking: Reported on 4/3/2023) 30 capsule 5     No current facility-administered medications for this visit.       Review of Systems   Constitutional:  Positive for fatigue. Negative for activity change, appetite change, chills, diaphoresis,  "fever and unexpected weight change.   HENT:  Negative for congestion, mouth sores, nosebleeds, postnasal drip, rhinorrhea, trouble swallowing and voice change.    Eyes:  Negative for pain and visual disturbance.   Respiratory:  Negative for cough, chest tightness, shortness of breath and wheezing.    Cardiovascular:  Negative for chest pain, palpitations and leg swelling.   Gastrointestinal:  Negative for abdominal distention, abdominal pain, blood in stool, constipation, diarrhea, nausea and vomiting.   Genitourinary:  Negative for difficulty urinating, dysuria, flank pain, frequency, hematuria and urgency.   Musculoskeletal:  Negative for arthralgias, back pain and myalgias.   Skin:  Negative for rash and wound.   Neurological:  Positive for numbness. Negative for dizziness, facial asymmetry, weakness, light-headedness and headaches.   Psychiatric/Behavioral:  Negative for agitation, behavioral problems, confusion, decreased concentration, dysphoric mood and sleep disturbance. The patient is not nervous/anxious.    All other systems reviewed and are negative.    ECOG Performance Status: 1     Objective:      Vitals:   Vitals:    04/20/23 1340   BP: 127/76   BP Location: Left arm   Patient Position: Sitting   BP Method: Medium (Automatic)   Pulse: 71   Resp: 18   Temp: 97.4 °F (36.3 °C)   TempSrc: Oral   SpO2: 100%   Weight: 53.6 kg (118 lb 2.7 oz)   Height: 5' 7" (1.702 m)           Physical Exam  Vitals reviewed.   Constitutional:       General: He is not in acute distress.     Appearance: Normal appearance. He is not ill-appearing, toxic-appearing or diaphoretic.   HENT:      Head: Normocephalic and atraumatic.      Right Ear: External ear normal.      Left Ear: External ear normal.   Eyes:      General: No scleral icterus.     Extraocular Movements: Extraocular movements intact.      Conjunctiva/sclera: Conjunctivae normal.      Pupils: Pupils are equal, round, and reactive to light.   Cardiovascular:      Rate " and Rhythm: Normal rate and regular rhythm.      Pulses: Normal pulses.      Heart sounds: Normal heart sounds. No murmur heard.  Pulmonary:      Effort: Pulmonary effort is normal. No respiratory distress.      Breath sounds: Normal breath sounds. No wheezing.   Chest:      Comments: Port to RCW, no signs of infection.  Abdominal:      General: Abdomen is flat. Bowel sounds are normal. There is no distension.      Palpations: Abdomen is soft. There is no mass.      Tenderness: There is no abdominal tenderness.      Comments: Vertical midline abdominal wound well healed   Musculoskeletal:         General: No swelling or deformity. Normal range of motion.      Right lower leg: No edema.      Left lower leg: No edema.   Skin:     Coloration: Skin is not jaundiced or pale.      Findings: No bruising, erythema or rash.   Neurological:      General: No focal deficit present.      Mental Status: He is alert and oriented to person, place, and time. Mental status is at baseline.      Cranial Nerves: No cranial nerve deficit.      Sensory: No sensory deficit.      Gait: Gait normal.   Psychiatric:         Mood and Affect: Mood normal.         Behavior: Behavior normal.         Thought Content: Thought content normal.         Judgment: Judgment normal.     Laboratory Data:  Lab Visit on 04/20/2023   Component Date Value Ref Range Status    WBC 04/20/2023 8.11  3.90 - 12.70 K/uL Final    RBC 04/20/2023 4.15 (L)  4.60 - 6.20 M/uL Final    Hemoglobin 04/20/2023 9.9 (L)  14.0 - 18.0 g/dL Final    Hematocrit 04/20/2023 34.1 (L)  40.0 - 54.0 % Final    MCV 04/20/2023 82  82 - 98 fL Final    MCH 04/20/2023 23.9 (L)  27.0 - 31.0 pg Final    MCHC 04/20/2023 29.0 (L)  32.0 - 36.0 g/dL Final    RDW 04/20/2023 16.1 (H)  11.5 - 14.5 % Final    Platelets 04/20/2023 350  150 - 450 K/uL Final    MPV 04/20/2023 10.5  9.2 - 12.9 fL Final    Immature Granulocytes 04/20/2023 0.2  0.0 - 0.5 % Final    Gran # (ANC) 04/20/2023 5.0  1.8 - 7.7 K/uL  Final    Immature Grans (Abs) 04/20/2023 0.02  0.00 - 0.04 K/uL Final    Comment: Mild elevation in immature granulocytes is non specific and   can be seen in a variety of conditions including stress response,   acute inflammation, trauma and pregnancy. Correlation with other   laboratory and clinical findings is essential.      Lymph # 04/20/2023 1.7  1.0 - 4.8 K/uL Final    Mono # 04/20/2023 1.0  0.3 - 1.0 K/uL Final    Eos # 04/20/2023 0.3  0.0 - 0.5 K/uL Final    Baso # 04/20/2023 0.05  0.00 - 0.20 K/uL Final    nRBC 04/20/2023 0  0 /100 WBC Final    Gran % 04/20/2023 62.2  38.0 - 73.0 % Final    Lymph % 04/20/2023 20.8  18.0 - 48.0 % Final    Mono % 04/20/2023 12.1  4.0 - 15.0 % Final    Eosinophil % 04/20/2023 4.1  0.0 - 8.0 % Final    Basophil % 04/20/2023 0.6  0.0 - 1.9 % Final    Differential Method 04/20/2023 Automated   Final    Sodium 04/20/2023 142  136 - 145 mmol/L Final    Potassium 04/20/2023 4.1  3.5 - 5.1 mmol/L Final    Chloride 04/20/2023 105  95 - 110 mmol/L Final    CO2 04/20/2023 24  23 - 29 mmol/L Final    Glucose 04/20/2023 87  70 - 110 mg/dL Final    BUN 04/20/2023 10  8 - 23 mg/dL Final    Creatinine 04/20/2023 0.8  0.5 - 1.4 mg/dL Final    Calcium 04/20/2023 9.7  8.7 - 10.5 mg/dL Final    Total Protein 04/20/2023 8.5 (H)  6.0 - 8.4 g/dL Final    Albumin 04/20/2023 3.3 (L)  3.5 - 5.2 g/dL Final    Total Bilirubin 04/20/2023 0.7  0.1 - 1.0 mg/dL Final    Comment: For infants and newborns, interpretation of results should be based  on gestational age, weight and in agreement with clinical  observations.    Premature Infant recommended reference ranges:  Up to 24 hours.............<8.0 mg/dL  Up to 48 hours............<12.0 mg/dL  3-5 days..................<15.0 mg/dL  6-29 days.................<15.0 mg/dL      Alkaline Phosphatase 04/20/2023 209 (H)  55 - 135 U/L Final    AST 04/20/2023 30  10 - 40 U/L Final    ALT 04/20/2023 15  10 - 44 U/L Final    Anion Gap 04/20/2023 13  8 - 16 mmol/L  Final    eGFR 04/20/2023 >60.0  >60 mL/min/1.73 m^2 Final    CEA 04/20/2023 23.3 (H)  0.0 - 5.0 ng/mL Final    Comment: CEA Normal Range:  Non-Smokers: 0-3.0 ng/mL  Smokers:     0-5.0 ng/mL    The testing method is a chemiluminescent microparticle immunoassay   manufactured by [employer] and performed on the [employer]   or   [employer]aport system. Values obtained with different assay manufacturers   for   methods may be different and cannot be used interchangeably.       Assessment and Plan        1. 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 Immunodeficiency secondary to neoplasm    4. Primary hypertension    5. MARIA A (acute kidney injury)    6. Anemia associated with chemotherapy    7. Pain    8. Drug-induced constipation    9. Weight decrease    10. Severe malnutrition    11. Lower urinary tract symptoms (LUTS)          1-3.  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  Guardant 360 was negative for BRAF, VIRI, HER2 amplification.   Pretreatment CEA 57.  We had a long and sonia discussion with him about his diagnosis. Unfortunately, the disease is not curable but remains treatable and he has good performance status.   Restaging scans after 7 cycles of FOLFOX + Pema showed significant reduction in colonic mass and liver mass.   we switched to maintenance as of cycle 8 with 5FU + Pema  Restaging scans from March 2022 show stable disease.   MRI on 7/18/22 showing hepatic progression of disease in the right hepatic lobe noted on the exam. CT CAP on 8/26 shows some mild progression in both liver metastases.    Discussed case at colorectal tumor board 8/31/22 and had a diagnostic lap performed by Dr. Rodriguez on 9/7 to assess for any occult peritoneal disease. Findings from the diagnostic lap were negative. Fluid from around from around the primary mass was sent for cytology that was negative for malignancy.   Underwent primary tumor resection on  10/20/22 with Dr. Bonilla.    Meanwhile his liver mets had grown.  We discussed his case at [facility] conference with plans for short term Y-90 and then restarting chemotherapy prior to considering any surgical options to his liver metastases.  He underwent Y-90 delivery on 12/30/22. Tolerated well.   CT CAP on 1/23/23 reviewed at [facility] conference with good response to Y-90, no new lesions.  Underwent second Y-90 on 1/27/23. Can consider R hepatectomy pending follow-up imaging after Y-90.     Received cycle 42 of FOLFOX (omitted oxaliplatin again starting cycle 41 due to neuropathy) on 2/9/23.  Because of 5-FU shortage nationally, we have been holding chemotherapy since mid-February 2023.  If he needs to restart chemotherapy (I.e. no plans for surgical resection), will place him on capecitabine maintenance for duration of 5-FU shortage.     Discussed at colorectal liver mets tumor board 3/16/23.  Plan for repeat Y-90 and then consideration of surgical resection and he will meet with liver transplant team as well.  Underwent Y-90 mapping 4/5/23 with IR.   Delivery not yet scheduled.    Seeing liver transplant team today.    Because of rising CEA will start on capecitabine as a bridge to transplant or resection.  If CEA not improving will consider addition of oxaliplatin.    I discussed the side effects and potential toxicities of capecitabine with them today. I am prescribing capecitabine 500 mg tabs with instructions to take 3 tabs in the AM and 3 tabs in the PM on days 1 through 7 of 14 day cycles.  This should be taken with food.  The prescription was sent today to the [facility].     RTC in 4 weeks.    4. Hypertension   -- BP normal today.  -- taking Amlodipine .   -- Following with PCP.   -- encouraged him and his daughter to monitor BP and HR closely at home.     5. MARIA A  -- Resolved. Cr normal.   -- Monitor. Encouraged continued hydration.     6. Anemia  -- Hgb stabilized.  Monitor.  -- No signs of  bleeding. Platelets normal.     7-10. Neoplasm related pain, weight loss, constipation  -- Will continue with pain management at this time as pain control is improved.   -- Following with nutrition. Encouraged increased protein. Had been drinking Boost daily.  -- Continue Miralax daily for constipation.    11. Urinary hesitancy  -- Continue Flomax.  -- Reports improvement since starting medication.     Patient is in agreement with the proposed treatment plan. All questions were answered to the patient's satisfaction. Pt knows to call clinic if anything is needed before the next clinic visit.    Bunny Schuster MD  Hematology/Oncology  [facility]      Route Chart for Scheduling    Med Onc Chart Routing      Follow up with physician 4 weeks.   Follow up with RACHEL    Infusion scheduling note    Injection scheduling note    Labs CBC, CMP and CEA   Scheduling:  Preferred lab:  Lab interval:  & PGx   Imaging    Pharmacy appointment    Other referrals

## 2023-04-20 NOTE — TELEPHONE ENCOUNTER
Geovanny, this is Laurie Art with [facility].  We are working on your prescription that your doctor has sent us. We will be working with your insurance to get this approved for you. We will be calling you along the way with updates on your medication.  If you have any questions, you can reach us at [phone].    Welcome call outcome: Left voicemail    Pa required for Xeloda. Submitted as urgent.

## 2023-04-21 ENCOUNTER — SPECIALTY PHARMACY (OUTPATIENT)
Dept: PHARMACY | Facility: CLINIC | Age: 73
End: 2023-04-21
Payer: MEDICARE

## 2023-04-21 NOTE — TELEPHONE ENCOUNTER
Specialty Pharmacy - Initial Clinical Assessment    Specialty Medication Orders Linked to Encounter      Flowsheet Row Most Recent Value   Medication #1 capecitabine (XELODA) 500 MG Tab (Order#[id number], Rx#[id number])          Patient Diagnosis   C18.9, C78.7 - Metastatic colon cancer to liver    Subjective    Javier Downs is a 72 y.o. male, who is followed by the specialty pharmacy service for management and education.    Recent Encounters       Date Type Provider Description    04/21/2023 Specialty Pharmacy Angie Donaldson, AliaD Initial Clinical Assessment    04/20/2023 Specialty Pharmacy Laurie Art, PharmD Referral Authorization          Clinical call attempts since last clinical assessment   No call attempts found.     Current Outpatient Medications   Medication Sig    acetaminophen (TYLENOL) 500 MG tablet Take 1 tablet (500 mg total) by mouth every 6 (six) hours as needed for Pain (alternate with ibuprofen). (Patient not taking: Reported on 3/8/2023)    amLODIPine (NORVASC) 10 MG tablet Take 1 tablet (10 mg total) by mouth once daily.    capecitabine (XELODA) 500 MG Tab Take 3 tablets (1,500 mg) by mouth twice daily with food on days 1 thru 7 of 14 day cycles.    ibuprofen (ADVIL,MOTRIN) 400 MG tablet Take 1 tablet (400 mg total) by mouth every 6 (six) hours as needed for Other (pain). Alternate with tylenol (Patient not taking: Reported on 3/8/2023)    ondansetron (ZOFRAN) 4 MG tablet Take 1 tablet (4 mg total) by mouth every 8 (eight) hours as needed for Nausea.    oxyCODONE (ROXICODONE) 5 MG immediate release tablet Take 1 tablet (5 mg total) by mouth every 6 (six) hours as needed for Pain. (Patient not taking: Reported on 3/8/2023)    tamsulosin (FLOMAX) 0.4 mg Cap Take 1 capsule (0.4 mg total) by mouth once daily. (Patient not taking: Reported on 4/3/2023)   Last reviewed on 4/20/2023  3:58 PM by Magan Cutler MD    Review of patient's allergies indicates:  No Known AllergiesLast  reviewed on  4/20/2023 3:58 PM by Magan Cutler    Drug Interactions    Drug interactions evaluated: yes  Clinically relevant drug interactions identified: no  Provided the patient with educational material regarding drug interactions: not applicable         Adverse Effects    *All other systems reviewed and are negative       Assessment Questions - Documented Responses      Flowsheet Row Most Recent Value   Assessment    Medication Reconciliation completed for patient No   During the past 4 weeks, has patient missed any activities due to condition or medication? No   During the past 4 weeks, did patient have any of the following urgent care visits? None   Goals of Therapy Status Discussed (new start)   Status of the patients ability to self-administer: Is Able   All education points have been covered with patient? No, patient declined- printed education provided   Welcome packet contents reviewed and discussed with patient? Yes   Assesment completed? Yes   Plan Therapy being initiated   Do you need to open a clinical intervention (i-vent)? No   Do you want to schedule first shipment? Yes   Medication #1 Assessment Info    Patient status New medication, New to [facility]   Is this medication appropriate for the patient? Yes   Is this medication effective? Not yet started          Refill Questions - Documented Responses      Flowsheet Row Most Recent Value   Patient Availability and HIPAA Verification    Does patient want to proceed with activity? Yes   HIPAA/medical authority confirmed? Yes   Relationship to patient of person spoken to? Child   Refill Screening Questions    When does the patient need to receive the medication? 04/24/23   Refill Delivery Questions    How will the patient receive the medication? Pickup   When does the patient need to receive the medication? 04/24/23   Shipping Address Home   Address in [facility] confirmed and updated if neccessary? No   Expected Copay ($) 0   Is the patient  "able to afford the medication copay? Yes   Payment Method zero copay   Days supply of Refill 28   Supplies needed? No supplies needed   Refill activity completed? Yes   Refill activity plan Refill scheduled   Shipment/Pickup Date: 04/21/23            Objective    He has a past medical history of MARIA A (acute kidney injury) (8/18/2022), Hypertension, and Metastatic colon cancer to liver (4/22/2021).    Tried/failed medications: 5Fu replacement    BP Readings from Last 4 Encounters:   04/20/23 135/76   04/20/23 127/76   04/05/23 (!) 96/54   04/03/23 113/68     Ht Readings from Last 4 Encounters:   04/20/23 5' 7" (1.702 m)   04/20/23 5' 7" (1.702 m)   04/05/23 5' 7" (1.702 m)   04/03/23 5' 7" (1.702 m)     Wt Readings from Last 4 Encounters:   04/20/23 53.8 kg (118 lb 9.7 oz)   04/20/23 53.6 kg (118 lb 2.7 oz)   04/05/23 52.6 kg (116 lb)   04/03/23 53 kg (116 lb 13.5 oz)     Recent Labs   Lab Result Units 04/20/23  1144 04/04/23  0918 03/23/23  0820 03/09/23  0934   RBC M/uL 4.15 L 3.99 L 3.64 L 3.04 L   Hemoglobin g/dL 9.9 L 9.6 L 8.6 L 7.6 L   Hematocrit % 34.1 L 33.8 L 30.8 L 26.4 L   WBC K/uL 8.11 6.93 6.02 5.99   Gran # (ANC) K/uL 5.0 4.4 3.7 3.4   Gran % % 62.2 63.7 62.1 56.3   Platelets K/uL 350 339 399 275   Sodium mmol/L 142 138 141 137   Potassium mmol/L 4.1 3.9 4.4 3.7   Chloride mmol/L 105 104 106 105   Glucose mg/dL 87 152 H 84 105   BUN mg/dL 10 10 9 9   Creatinine mg/dL 0.8 0.9 0.8 0.7   Calcium mg/dL 9.7 10.1 9.4 8.8   Total Protein g/dL 8.5 H 8.5 H 7.4 7.0   Albumin g/dL 3.3 L 3.1 L 2.6 L 2.3 L   Total Bilirubin mg/dL 0.7 0.7 0.8 0.8   Alkaline Phosphatase U/L 209 H 190 H 171 H 176 H   AST U/L 30 26 25 23   ALT U/L 15 12 9 L 10     The goals of cancer treatment include:  Achieving remission of cancer, if possible  Reducing tumor size and spread of cancer, if remission is not possible  Minimizing pain and symptoms of the cancer  Preventing infection and other complications of treatment  Promoting adequate " nutrition  Encouraging proper hydration  Improving or maintaining quality of life  Maintaining optimal therapy adherence  Minimizing and managing side effects    Goals of Therapy Status: Discussed (new start)    Assessment/Plan  Patient plans to start therapy on 04/24/23      Indication, dosage, appropriateness, effectiveness, safety and convenience of his specialty medication(s) were reviewed today.     Patient Education   Pharmacist offer to  patient was declined. Printed educational materials will be provided with medication.  Patient did accept verbal education on the following topics:  · Expectations and possible outcomes of therapy  · Proper use, timely administration, and missed dose management  · Duration of therapy  · Side effects, including prevention, minimization, and management  · Reviews recommended vaccinations, as appropriate    Pts daughter will review hand outs included in the rx and call [facility] with any questions.    Tasks added this encounter   No tasks added.   Tasks due within next 3 months   4/21/2023 - Clinical - Initial Assessment     Angie Donaldson, PharmD  [facility]  [address]  [place] [address]  Phone: [phone]  Fax: [phone]

## 2023-04-24 ENCOUNTER — TELEPHONE (OUTPATIENT)
Dept: HEMATOLOGY/ONCOLOGY | Facility: CLINIC | Age: 73
End: 2023-04-24
Payer: MEDICARE

## 2023-04-24 NOTE — TELEPHONE ENCOUNTER
"----- Message from Mckenzie Alegre sent at 4/24/2023 10:10 AM CDT -----  Consult/Advisory:       Name Of Caller: Carrie Downs (Daughter)   [phone] (Mobile)       Contact Preference?:[phone] (Mobile)       Provider Name: Landen       Does patient feel the need to be seen today? No       What is the nature of the call?: Returning call to office.            Additional Notes:  "Thank you for all that you do for our patients                "

## 2023-04-24 NOTE — TELEPHONE ENCOUNTER
----- Message from Kaylyn Clemens sent at 4/24/2023  9:42 AM CDT -----  Regarding: Medication Adv  Contact: Pt daughter  Pt daughter us requesting a callback in regards to pt medication that was picked up from the specialty pharmacy. Pt was told to reach out to provider once that medication was picked up. Please adv pt    Medication:capecitabine (XELODA) 500 MG Tab    Confirmed contact below:   Contact Name:Carrie Garciaelisa  Phone Number: [phone]

## 2023-04-24 NOTE — TELEPHONE ENCOUNTER
"----- Message from Felice Negrete sent at 4/24/2023  9:20 AM CDT -----  Consult/Advisory:          Name Of Caller: Carrie Downs (Daughter)       Contact Preference?:  [phone] (Mobile)      Provider Name: Landen       Does patient feel the need to be seen today? No      What is the nature of the call?: Calling to inform office that pt just picked up his chemo medication          Additional Notes:  "Thank you for all that you do for our patients"      "

## 2023-04-26 ENCOUNTER — TELEPHONE (OUTPATIENT)
Dept: INTERVENTIONAL RADIOLOGY/VASCULAR | Facility: CLINIC | Age: 73
End: 2023-04-26
Payer: MEDICARE

## 2023-04-26 NOTE — TELEPHONE ENCOUNTER
Left message for pt to return my call. Need to schedule IR procedure.  Please forward call to [phone]. Thanks

## 2023-04-27 ENCOUNTER — TELEPHONE (OUTPATIENT)
Dept: TRANSPLANT | Facility: CLINIC | Age: 73
End: 2023-04-27
Payer: MEDICARE

## 2023-04-27 ENCOUNTER — PATIENT MESSAGE (OUTPATIENT)
Dept: TRANSPLANT | Facility: CLINIC | Age: 73
End: 2023-04-27
Payer: MEDICARE

## 2023-05-01 ENCOUNTER — TELEPHONE (OUTPATIENT)
Dept: INTERVENTIONAL RADIOLOGY/VASCULAR | Facility: CLINIC | Age: 73
End: 2023-05-01
Payer: MEDICARE

## 2023-05-01 NOTE — TELEPHONE ENCOUNTER
Message received via the portal informing patient has decided not to move forward with liver transplant. Patient expressed thanks and appreciation to everyone for their service but says due to the risk associated with the surgery, he will not be pursuing transplant. MDs notified.     [place] encounter resolved.

## 2023-05-01 NOTE — TELEPHONE ENCOUNTER
Left message for pt to return my call. Need to schedule IR procedure.  Please forward call to [phone]. Thanks

## 2023-05-08 ENCOUNTER — TELEPHONE (OUTPATIENT)
Dept: SURGERY | Facility: CLINIC | Age: 73
End: 2023-05-08
Payer: MEDICARE

## 2023-05-08 NOTE — TELEPHONE ENCOUNTER
Call placed to pt daughter to schedule follow up appt with Dr. Rodriguez. Left message with call back number.

## 2023-05-08 NOTE — TELEPHONE ENCOUNTER
Call placed to pt daughter. Follow up appt scheduled with Dr. Rodriguez per pt daughter request. Time date and location reviewed. Pt daughter verbalized understanding.

## 2023-05-13 ENCOUNTER — PATIENT MESSAGE (OUTPATIENT)
Dept: HEMATOLOGY/ONCOLOGY | Facility: CLINIC | Age: 73
End: 2023-05-13
Payer: MEDICARE

## 2023-05-15 ENCOUNTER — SPECIALTY PHARMACY (OUTPATIENT)
Dept: PHARMACY | Facility: CLINIC | Age: 73
End: 2023-05-15
Payer: MEDICARE

## 2023-05-15 NOTE — TELEPHONE ENCOUNTER
Specialty Pharmacy - Refill Coordination    Specialty Medication Orders Linked to Encounter      Flowsheet Row Most Recent Value   Medication #1 capecitabine (XELODA) 500 MG Tab (Order#[id number], Rx#[id number])            Refill Questions - Documented Responses      Flowsheet Row Most Recent Value   Patient Availability and HIPAA Verification    Does patient want to proceed with activity? Yes   HIPAA/medical authority confirmed? Yes   Relationship to patient of person spoken to? Child   Refill Screening Questions    Changes to allergies? No   Changes to medications? No   New conditions since last clinic visit? No   Unplanned office visit, urgent care, ED, or hospital admission in the last 4 weeks? No   How does patient/caregiver feel medication is working? Good   Financial problems or insurance changes? No   How many doses of your specialty medications were missed in the last 4 weeks? 0   Would patient like to speak to a pharmacist? No   When does the patient need to receive the medication? 05/22/23   Refill Delivery Questions    How will the patient receive the medication? Pickup   When does the patient need to receive the medication? 05/22/23   Expected Copay ($) 0   Is the patient able to afford the medication copay? Yes   Payment Method zero copay   Days supply of Refill 28   Supplies needed? No supplies needed   Refill activity completed? Yes   Refill activity plan Refill scheduled   Shipment/Pickup Date: 05/16/23            Current Outpatient Medications   Medication Sig    acetaminophen (TYLENOL) 500 MG tablet Take 1 tablet (500 mg total) by mouth every 6 (six) hours as needed for Pain (alternate with ibuprofen). (Patient not taking: Reported on 3/8/2023)    amLODIPine (NORVASC) 10 MG tablet Take 1 tablet (10 mg total) by mouth once daily.    capecitabine (XELODA) 500 MG Tab Take 3 tablets (1,500 mg) by mouth twice daily with food on days 1 thru 7 of 14 day cycles.    ibuprofen (ADVIL,MOTRIN) 400 MG tablet  Take 1 tablet (400 mg total) by mouth every 6 (six) hours as needed for Other (pain). Alternate with tylenol (Patient not taking: Reported on 3/8/2023)    ondansetron (ZOFRAN) 4 MG tablet Take 1 tablet (4 mg total) by mouth every 8 (eight) hours as needed for Nausea.    oxyCODONE (ROXICODONE) 5 MG immediate release tablet Take 1 tablet (5 mg total) by mouth every 6 (six) hours as needed for Pain. (Patient not taking: Reported on 3/8/2023)    tamsulosin (FLOMAX) 0.4 mg Cap Take 1 capsule (0.4 mg total) by mouth once daily. (Patient not taking: Reported on 4/3/2023)   Last reviewed on 4/20/2023  3:58 PM by Magan Cutler MD    Review of patient's allergies indicates:  No Known Allergies Last reviewed on  4/20/2023 3:58 PM by Magan Cutler      Tasks added this encounter   No tasks added.   Tasks due within next 3 months   5/15/2023 - Refill Coordination Outreach (1 time occurrence)     [facility]  [address]  [facility] [address]  Phone: [phone]  Fax: [phone]

## 2023-05-16 ENCOUNTER — TELEPHONE (OUTPATIENT)
Dept: INTERVENTIONAL RADIOLOGY/VASCULAR | Facility: HOSPITAL | Age: 73
End: 2023-05-16
Payer: MEDICARE

## 2023-05-16 NOTE — NURSING
Pre-procedure call complete.  Spoke to patients daughter (per pts permission).  Instructed to have pt not to eat or drink anything after midnight the night before procedure.  Aware will need someone to provide transport home and monitor pt 8 hours post procedure.  No driving for 3 days after procedure.   Medications reviewed.  Arrival time and location given.  Expected length of stay reviewed.  Covid screening completed.  Daughter  verbalized understanding.

## 2023-05-17 ENCOUNTER — HOSPITAL ENCOUNTER (OUTPATIENT)
Dept: INTERVENTIONAL RADIOLOGY/VASCULAR | Facility: HOSPITAL | Age: 73
Discharge: HOME | End: 2023-05-17
Attending: FAMILY MEDICINE | Admitting: RADIOLOGY
Payer: MEDICARE

## 2023-05-17 ENCOUNTER — HOSPITAL ENCOUNTER (OUTPATIENT)
Dept: RADIOLOGY | Facility: HOSPITAL | Age: 73
Discharge: HOME | End: 2023-05-17
Attending: FAMILY MEDICINE
Payer: MEDICARE

## 2023-05-17 VITALS
OXYGEN SATURATION: 100 % | HEART RATE: 70 BPM | TEMPERATURE: 98 F | DIASTOLIC BLOOD PRESSURE: 73 MMHG | SYSTOLIC BLOOD PRESSURE: 120 MMHG | RESPIRATION RATE: 14 BRPM

## 2023-05-17 DIAGNOSIS — C78.7 COLON CANCER METASTASIZED TO LIVER: ICD-10-CM

## 2023-05-17 DIAGNOSIS — C18.9 COLON CANCER METASTASIZED TO LIVER: ICD-10-CM

## 2023-05-17 DIAGNOSIS — C18.9 METASTATIC COLON CANCER TO LIVER: ICD-10-CM

## 2023-05-17 DIAGNOSIS — C78.7 METASTATIC COLON CANCER TO LIVER: ICD-10-CM

## 2023-05-17 PROCEDURE — 79445 NUCLEAR RX INTRA-ARTERIAL: CPT | Mod: 26,,, | Performed by: RADIOLOGY

## 2023-05-17 PROCEDURE — 75726 CHG ANGIO VISCERAL SELECTV/SUBSELEC: ICD-10-PCS | Mod: 26,59,, | Performed by: RADIOLOGY

## 2023-05-17 PROCEDURE — 76937 PR  US GUIDE, VASCULAR ACCESS: ICD-10-PCS | Mod: 26,,, | Performed by: RADIOLOGY

## 2023-05-17 PROCEDURE — 75726 ARTERY X-RAYS ABDOMEN: CPT | Mod: TC,59 | Performed by: RADIOLOGY

## 2023-05-17 PROCEDURE — 75726 ARTERY X-RAYS ABDOMEN: CPT | Mod: 26,59,, | Performed by: RADIOLOGY

## 2023-05-17 PROCEDURE — 76380 CAT SCAN FOLLOW-UP STUDY: CPT | Mod: TC | Performed by: RADIOLOGY

## 2023-05-17 PROCEDURE — 76377 PR  3D RENDERING W/ IMAGE POSTPROCESS: ICD-10-PCS | Mod: 26,59,, | Performed by: RADIOLOGY

## 2023-05-17 PROCEDURE — 99152 MOD SED SAME PHYS/QHP 5/>YRS: CPT | Performed by: RADIOLOGY

## 2023-05-17 PROCEDURE — 78201 NM LIVER IMAGING STATIC POST Y-90 EMBOLIZATION: ICD-10-PCS | Mod: 26,59,, | Performed by: RADIOLOGY

## 2023-05-17 PROCEDURE — A4550 SURGICAL TRAYS: HCPCS

## 2023-05-17 PROCEDURE — 76937 US GUIDE VASCULAR ACCESS: CPT | Mod: 26,,, | Performed by: RADIOLOGY

## 2023-05-17 PROCEDURE — 36247 PR PLACE CATH SUBSUBSELECT ART,ABD/PEL: ICD-10-PCS | Mod: ,,, | Performed by: RADIOLOGY

## 2023-05-17 PROCEDURE — 37243 VASC EMBOLIZE/OCCLUDE ORGAN: CPT | Performed by: RADIOLOGY

## 2023-05-17 PROCEDURE — 37243 IR EMBOLIZATION COMP FOR TUMOR_ORGAN ISCHEMIA_INFARC: ICD-10-PCS | Mod: ,,, | Performed by: RADIOLOGY

## 2023-05-17 PROCEDURE — 76377 3D RENDER W/INTRP POSTPROCES: CPT | Mod: 26,59,, | Performed by: RADIOLOGY

## 2023-05-17 PROCEDURE — 99153 MOD SED SAME PHYS/QHP EA: CPT | Performed by: RADIOLOGY

## 2023-05-17 PROCEDURE — 76380 PR  CT SCAN,LIMITED/LOCALIZED F/U STUDY: ICD-10-PCS | Mod: 26,59,, | Performed by: RADIOLOGY

## 2023-05-17 PROCEDURE — 76380 CAT SCAN FOLLOW-UP STUDY: CPT | Mod: 26,59,, | Performed by: RADIOLOGY

## 2023-05-17 PROCEDURE — 36247 INS CATH ABD/L-EXT ART 3RD: CPT | Mod: RT | Performed by: RADIOLOGY

## 2023-05-17 PROCEDURE — 78830 RP LOCLZJ TUM SPECT W/CT 1: CPT | Mod: 26,,, | Performed by: RADIOLOGY

## 2023-05-17 PROCEDURE — G0269 OCCLUSIVE DEVICE IN VEIN ART: HCPCS | Performed by: RADIOLOGY

## 2023-05-17 PROCEDURE — 78201 LIVER IMAGING STATIC ONLY: CPT | Mod: 26,59,, | Performed by: RADIOLOGY

## 2023-05-17 PROCEDURE — 78830 RP LOCLZJ TUM SPECT W/CT 1: CPT | Mod: TC

## 2023-05-17 PROCEDURE — 75774 PR  ANGIO EA ADDNL SELECTV VESSEL: ICD-10-PCS | Mod: 26,59,, | Performed by: RADIOLOGY

## 2023-05-17 PROCEDURE — 36248 INS CATH ABD/L-EXT ART ADDL: CPT | Performed by: RADIOLOGY

## 2023-05-17 PROCEDURE — 77300 PR RADIATION THERAPY,DOSIMETRY PLAN: ICD-10-PCS | Mod: 26,,, | Performed by: RADIOLOGY

## 2023-05-17 PROCEDURE — 77300 RADIATION THERAPY DOSE PLAN: CPT | Mod: TC | Performed by: RADIOLOGY

## 2023-05-17 PROCEDURE — 76377 3D RENDER W/INTRP POSTPROCES: CPT | Mod: TC | Performed by: RADIOLOGY

## 2023-05-17 PROCEDURE — 79445 NUCLEAR RX INTRA-ARTERIAL: CPT | Mod: TC | Performed by: RADIOLOGY

## 2023-05-17 PROCEDURE — 63600175 PHARM REV CODE 636 W HCPCS: Performed by: RADIOLOGY

## 2023-05-17 PROCEDURE — 75774 ARTERY X-RAY EACH VESSEL: CPT | Mod: TC,59 | Performed by: RADIOLOGY

## 2023-05-17 PROCEDURE — 36248 INS CATH ABD/L-EXT ART ADDL: CPT | Mod: ,,, | Performed by: RADIOLOGY

## 2023-05-17 PROCEDURE — 36247 INS CATH ABD/L-EXT ART 3RD: CPT | Mod: ,,, | Performed by: RADIOLOGY

## 2023-05-17 PROCEDURE — 75774 ARTERY X-RAY EACH VESSEL: CPT | Mod: 26,59,, | Performed by: RADIOLOGY

## 2023-05-17 PROCEDURE — 25500020 PHARM REV CODE 255: Performed by: RADIOLOGY

## 2023-05-17 PROCEDURE — 77300 RADIATION THERAPY DOSE PLAN: CPT | Mod: 26,,, | Performed by: RADIOLOGY

## 2023-05-17 PROCEDURE — 76937 US GUIDE VASCULAR ACCESS: CPT | Mod: TC | Performed by: RADIOLOGY

## 2023-05-17 PROCEDURE — 78830 NM SPECT/CT SINGLE AREA: ICD-10-PCS | Mod: 26,,, | Performed by: RADIOLOGY

## 2023-05-17 PROCEDURE — 79445 PR  NUCLEAR THERAPY, INTRA-ARTERIAL: ICD-10-PCS | Mod: 26,,, | Performed by: RADIOLOGY

## 2023-05-17 PROCEDURE — 78201 LIVER IMAGING STATIC ONLY: CPT | Mod: TC

## 2023-05-17 PROCEDURE — 36248 PR PR INS CATH ABD/L-EXT ART ADDL 2ND ORD/3RD ORD/BYD: ICD-10-PCS | Mod: ,,, | Performed by: RADIOLOGY

## 2023-05-17 RX ORDER — FENTANYL CITRATE 50 UG/ML
INJECTION, SOLUTION INTRAMUSCULAR; INTRAVENOUS
Status: COMPLETED | OUTPATIENT
Start: 2023-05-17 | End: 2023-05-17

## 2023-05-17 RX ORDER — MIDAZOLAM HYDROCHLORIDE 1 MG/ML
INJECTION INTRAMUSCULAR; INTRAVENOUS
Status: COMPLETED | OUTPATIENT
Start: 2023-05-17 | End: 2023-05-17

## 2023-05-17 RX ORDER — DEXAMETHASONE SODIUM PHOSPHATE 100 MG/10ML
INJECTION INTRAMUSCULAR; INTRAVENOUS
Status: COMPLETED | OUTPATIENT
Start: 2023-05-17 | End: 2023-05-17

## 2023-05-17 RX ORDER — ONDANSETRON 2 MG/ML
4 INJECTION INTRAMUSCULAR; INTRAVENOUS EVERY 6 HOURS PRN
Status: DISCONTINUED | OUTPATIENT
Start: 2023-05-17 | End: 2023-05-18 | Stop reason: HOSPADM

## 2023-05-17 RX ORDER — LIDOCAINE HYDROCHLORIDE 10 MG/ML
1 INJECTION, SOLUTION EPIDURAL; INFILTRATION; INTRACAUDAL; PERINEURAL ONCE AS NEEDED
Status: DISCONTINUED | OUTPATIENT
Start: 2023-05-17 | End: 2023-05-18 | Stop reason: HOSPADM

## 2023-05-17 RX ORDER — SODIUM CHLORIDE 9 MG/ML
INJECTION, SOLUTION INTRAVENOUS CONTINUOUS
Status: DISCONTINUED | OUTPATIENT
Start: 2023-05-17 | End: 2023-05-18 | Stop reason: HOSPADM

## 2023-05-17 RX ADMIN — MIDAZOLAM HYDROCHLORIDE 0.5 MG: 1 INJECTION INTRAMUSCULAR; INTRAVENOUS at 01:05

## 2023-05-17 RX ADMIN — FENTANYL CITRATE 25 MCG: 50 INJECTION, SOLUTION INTRAMUSCULAR; INTRAVENOUS at 02:05

## 2023-05-17 RX ADMIN — IOHEXOL 45 ML: 300 INJECTION, SOLUTION INTRAVENOUS at 02:05

## 2023-05-17 RX ADMIN — DEXAMETHASONE SODIUM PHOSPHATE 20 MG: 10 INJECTION INTRAMUSCULAR; INTRAVENOUS at 02:05

## 2023-05-17 RX ADMIN — MIDAZOLAM HYDROCHLORIDE 1 MG: 1 INJECTION INTRAMUSCULAR; INTRAVENOUS at 01:05

## 2023-05-17 RX ADMIN — MIDAZOLAM HYDROCHLORIDE 0.5 MG: 1 INJECTION INTRAMUSCULAR; INTRAVENOUS at 02:05

## 2023-05-17 RX ADMIN — FENTANYL CITRATE 25 MCG: 50 INJECTION, SOLUTION INTRAMUSCULAR; INTRAVENOUS at 01:05

## 2023-05-17 RX ADMIN — FENTANYL CITRATE 50 MCG: 50 INJECTION, SOLUTION INTRAMUSCULAR; INTRAVENOUS at 01:05

## 2023-05-17 NOTE — PLAN OF CARE
2hr flat IR recovery complete. VSS. Dressing CDI. +2DP pulses bilaterally. Discharge instructions reviewed and given. Pt. Awaiting transport via wheelchair to garage and private vehicle.

## 2023-05-17 NOTE — DISCHARGE SUMMARY
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  Interventional Radiology  Department of Radiology  Pager: [phone]

## 2023-05-17 NOTE — DISCHARGE INSTRUCTIONS
[facility] [phone] (MON-FRI 8 AM- 5PM). Radiology Resident on call [phone].  Discharge Instructions for Hepatic Angiography  You had a procedure called hepatic angiography. This is an X-ray study of the blood vessels that supply your liver. During  the procedure, a catheter (thin, flexible tube) was inserted into one of your blood vessels through a small incision. A  specially trained doctor called an interventional radiologist usually does the procedure. Heres what to do at home  afterward.  Home care  Follow your doctor's recommendations on when it is safe to drive after the procedure.  Exercise according to your doctors recommendations.  You can shower the day after the procedure.  Ask your doctor when it is safe to swim or take a bath.  Take your medications exactly as directed. Dont skip doses.  Unless directed otherwise, drink 6 to 8 glasses of water a day to prevent dehydration and to help flush your body of  the dye that was used during your procedure.  Take your temperature and check the place where your incision was made for signs of infection (redness, swelling, bleeding or  warmth) every day for a week.  Report any numbness or coolness to the extremity. Report any discoloration to the puncture site or the extremity.  Follow-up care  Make a follow-up appointment as directed by our staff.  Ask your doctor when you can return to work.  Date Last Reviewed: 6/14/2015 © 2000-2017 [employer]. [address] [place] [address]. All rights reserved. This information  is not intended as a substitute for professional medical care. Always follow your healthcare professional's instructions. Post Yttrium (Y-90) Instructions    Pregnant women and pets should be no closer to the patient than 3 feet for a period of 3 days.    Children under 10 years of age should be no closer than 3 feet for 3 days, although brief contact with the patient for a few seconds is acceptable.

## 2023-05-17 NOTE — H&P
Vascular and Interventional Radiology History & Physical    Date:  5/17/2023    Chief Complaint:   Liver lesion    History of Present Illness:  Javier Downs is a 72 y.o. male with hx of metastatic colorectal cancer who presents for Y90 radioembolization of liver lesion. Pt has had multiple prior Y90 radioembolizations.      Past Medical History:  Past Medical History:   Diagnosis Date    MARIA A (acute kidney injury) 8/18/2022    Hypertension     Metastatic colon cancer to liver 4/22/2021       Past Surgical History:  Past Surgical History:   Procedure Laterality Date    COLONOSCOPY N/A 4/20/2021    Procedure: COLONOSCOPY with possible stent;  Surgeon: EDILMA Bonilla MD;  Location: [facility] ENDO (2ND FLR);  Service: Colon and Rectal;  Laterality: N/A;    DIAGNOSTIC LAPAROSCOPY N/A 9/7/2022    Procedure: LAPAROSCOPY, DIAGNOSTIC;  Surgeon: Adrian Rodriguez MD;  [facility];  Service: General;  Laterality: N/A;    INSERTION OF TUNNELED CENTRAL VENOUS CATHETER (CVC) WITH SUBCUTANEOUS PORT N/A 5/3/2021    Procedure: [id number]-MEYL-X-CATH;  Surgeon: Francisco Layton MD;  Location: [facility] OR 2ND FLR;  Service: Vascular;  Laterality: N/A;    OMENTECTOMY N/A 10/20/2022    Procedure: OMENTECTOMY;  Surgeon: EDILMA Bonilla MD;  [facility];  Service: Colon and Rectal;  Laterality: N/A;    RIGHT HEMICOLECTOMY N/A 10/20/2022    Procedure: HEMICOLECTOMY, RIGHT, extended;  Surgeon: EDILMA Bonilla MD;  [facility];  Service: Colon and Rectal;  Laterality: N/A;  Extended right hemicolectomy CONSENT IN AM        Sedation History:    Denies any adverse reactions.  Denies problems laying flat.    Social History:  Social History     Tobacco Use    Smoking status: Never    Smokeless tobacco: Never   Substance Use Topics    Alcohol use: Not Currently    Drug use: Never        Home Medications:   Prior to Admission medications    Medication Sig Start Date End Date Taking? Authorizing Provider    amLODIPine (NORVASC) 10 MG tablet Take 1 tablet (10 mg total) by mouth once daily. 8/3/22 7/3/23 Yes Anali Hernandez PA-C   capecitabine (XELODA) 500 MG Tab Take 3 tablets (1,500 mg) by mouth twice daily with food on days 1 thru 7 of 14 day cycles. 4/20/23  Yes Bunny Schuster MD   ibuprofen (ADVIL,MOTRIN) 400 MG tablet Take 1 tablet (400 mg total) by mouth every 6 (six) hours as needed for Other (pain). Alternate with tylenol 5/3/21  Yes FRANKLIN Delarosa MD   acetaminophen (TYLENOL) 500 MG tablet Take 1 tablet (500 mg total) by mouth every 6 (six) hours as needed for Pain (alternate with ibuprofen).  Patient not taking: Reported on 3/8/2023 5/3/21   FRANKLIN Delarosa MD   ondansetron (ZOFRAN) 4 MG tablet Take 1 tablet (4 mg total) by mouth every 8 (eight) hours as needed for Nausea. 4/21/21   Thomas Monroe MD   oxyCODONE (ROXICODONE) 5 MG immediate release tablet Take 1 tablet (5 mg total) by mouth every 6 (six) hours as needed for Pain.  Patient not taking: Reported on 3/8/2023 11/2/22   Elsy Kelly NP   tamsulosin (FLOMAX) 0.4 mg Cap Take 1 capsule (0.4 mg total) by mouth once daily.  Patient not taking: Reported on 4/3/2023 11/16/22 11/16/23  Bunny Schuster MD       Inpatient Medications:    Current Outpatient Medications:     amLODIPine (NORVASC) 10 MG tablet, Take 1 tablet (10 mg total) by mouth once daily., Disp: 90 tablet, Rfl: 3    capecitabine (XELODA) 500 MG Tab, Take 3 tablets (1,500 mg) by mouth twice daily with food on days 1 thru 7 of 14 day cycles., Disp: 84 tablet, Rfl: 5    ibuprofen (ADVIL,MOTRIN) 400 MG tablet, Take 1 tablet (400 mg total) by mouth every 6 (six) hours as needed for Other (pain). Alternate with tylenol, Disp: , Rfl:     acetaminophen (TYLENOL) 500 MG tablet, Take 1 tablet (500 mg total) by mouth every 6 (six) hours as needed for Pain (alternate with ibuprofen). (Patient not taking: Reported on 3/8/2023), Disp: , Rfl: 0    ondansetron (ZOFRAN) 4 MG tablet, Take  1 tablet (4 mg total) by mouth every 8 (eight) hours as needed for Nausea., Disp: 30 tablet, Rfl: 3    oxyCODONE (ROXICODONE) 5 MG immediate release tablet, Take 1 tablet (5 mg total) by mouth every 6 (six) hours as needed for Pain. (Patient not taking: Reported on 3/8/2023), Disp: 20 tablet, Rfl: 0    tamsulosin (FLOMAX) 0.4 mg Cap, Take 1 capsule (0.4 mg total) by mouth once daily. (Patient not taking: Reported on 4/3/2023), Disp: 30 capsule, Rfl: 5    Current Facility-Administered Medications:     0.9%  NaCl infusion, , Intravenous, Continuous, Stephanie Johnson NP    LIDOcaine (PF) 10 mg/ml (1%) injection 10 mg, 1 mL, Intradermal, Once PRN, Stephanie Johnson NP     Anticoagulants/Antiplatelets:   no anticoagulation    Allergies:   Review of patient's allergies indicates:  No Known Allergies    Review of Systems:   As documented in primary provider H&P.    Vital Signs (Most Recent):       Physical Exam:  No acute distress, laying comfortably in bed, pleasant and cooperative  Regular rate and rhythm  Breathing unlabored  Abdomen benign  Extremities warm and well perfused    Sedation Exam:  ASA: III - Patient appears to have severe systemic disease not posing a constant threat to life   Mallampati: II (hard and soft palate, upper portion of tonsils anduvula visible)     Laboratory:  Lab Results   Component Value Date    INR 1.1 05/17/2023       Lab Results   Component Value Date    WBC 5.62 05/17/2023    WBC 5.62 05/17/2023    HGB 10.2 (L) 05/17/2023    HGB 10.2 (L) 05/17/2023    HCT 35.0 (L) 05/17/2023    HCT 35.0 (L) 05/17/2023    MCV 81 (L) 05/17/2023    MCV 81 (L) 05/17/2023     05/17/2023     05/17/2023      Lab Results   Component Value Date    GLU 85 05/17/2023    GLU 87 05/17/2023     05/17/2023     05/17/2023    K 4.4 05/17/2023    K 4.6 05/17/2023     05/17/2023     05/17/2023    CO2 23 05/17/2023    CO2 24 05/17/2023    BUN 10 05/17/2023    BUN 10 05/17/2023     CREATININE 0.9 05/17/2023    CREATININE 0.9 05/17/2023    CALCIUM 9.9 05/17/2023    CALCIUM 10.1 05/17/2023    MG 1.9 11/02/2022    ALT 16 05/17/2023    ALT 19 05/17/2023    AST 32 05/17/2023    AST 31 05/17/2023    ALBUMIN 3.4 (L) 05/17/2023    ALBUMIN 3.4 (L) 05/17/2023    BILITOT 1.0 05/17/2023    BILITOT 1.0 05/17/2023    BILIDIR 0.4 (H) 05/17/2023       Imaging:  Reviewed.      ASSESSMENT/PLAN:                     Sedation Plan: Moderate  Patient will undergo: Y90 radioembolization.    Sean Stevenson MD  Radiology PGY-5

## 2023-05-17 NOTE — SEDATION DOCUMENTATION
Y-90 procedure complete, pt tolerated well, hemostasis achieved with Vascade closure device at 1407, pt to remian flat for 2 hours until 1607

## 2023-05-17 NOTE — PROGRESS NOTES
Pt's preop checklist completed. Pt's VS taken and WNL, IV started, consents signed, wrist band put on. All pt's questions and concerns addressed. Bed locked and in lowest position with call bell in reach. Pt ready for procedure.

## 2023-05-17 NOTE — PROCEDURES
Radiology Post-Procedure Note    Pre Op Diagnosis: mCRC    Post Op Diagnosis: Same    Procedure: Y90 delivery    Procedure performed by: Raul Vidal MD    Written Informed Consent Obtained: Yes  Specimen Removed: NO  Estimated Blood Loss: Minimal    Findings:   Via rt cfa, celiac, rha, s4 and subsegmental branch selected and angiogram obtained. Y90 delivered without complications. Vascade to rt cfa. See dictation. No complications.    Patient tolerated procedure well.    Raul Vidal M.D.  Interventional Radiology  Department of Radiology  Pager: [phone]

## 2023-05-18 DIAGNOSIS — C78.7 COLON CANCER METASTASIZED TO LIVER: Primary | ICD-10-CM

## 2023-05-18 DIAGNOSIS — C18.9 COLON CANCER METASTASIZED TO LIVER: Primary | ICD-10-CM

## 2023-05-19 ENCOUNTER — TELEPHONE (OUTPATIENT)
Dept: SURGERY | Facility: CLINIC | Age: 73
End: 2023-05-19
Payer: MEDICARE

## 2023-05-19 NOTE — TELEPHONE ENCOUNTER
Call placed to pt daughter. Rescheduled pt appt from May 24th to June 14th per Dr. Rodriguez's request. Time date and location provided. Pt daughter verbalized understanding and this RN thanked her for her understanding.

## 2023-05-24 ENCOUNTER — TELEPHONE (OUTPATIENT)
Dept: INTERVENTIONAL RADIOLOGY/VASCULAR | Facility: CLINIC | Age: 73
End: 2023-05-24
Payer: MEDICARE

## 2023-05-30 ENCOUNTER — OFFICE VISIT (OUTPATIENT)
Dept: HEMATOLOGY/ONCOLOGY | Facility: CLINIC | Age: 73
End: 2023-05-30
Payer: MEDICARE

## 2023-05-30 VITALS
WEIGHT: 118.94 LBS | TEMPERATURE: 98 F | BODY MASS INDEX: 18.67 KG/M2 | OXYGEN SATURATION: 100 % | SYSTOLIC BLOOD PRESSURE: 110 MMHG | DIASTOLIC BLOOD PRESSURE: 66 MMHG | HEART RATE: 76 BPM | HEIGHT: 67 IN | RESPIRATION RATE: 18 BRPM

## 2023-05-30 DIAGNOSIS — D64.81 ANEMIA ASSOCIATED WITH CHEMOTHERAPY: ICD-10-CM

## 2023-05-30 DIAGNOSIS — R63.4 WEIGHT DECREASE: ICD-10-CM

## 2023-05-30 DIAGNOSIS — D49.9 IMMUNODEFICIENCY SECONDARY TO NEOPLASM: ICD-10-CM

## 2023-05-30 DIAGNOSIS — D84.821 IMMUNODEFICIENCY DUE TO CHEMOTHERAPY: ICD-10-CM

## 2023-05-30 DIAGNOSIS — I10 PRIMARY HYPERTENSION: ICD-10-CM

## 2023-05-30 DIAGNOSIS — D84.81 IMMUNODEFICIENCY SECONDARY TO NEOPLASM: ICD-10-CM

## 2023-05-30 DIAGNOSIS — E43 SEVERE MALNUTRITION: ICD-10-CM

## 2023-05-30 DIAGNOSIS — T45.1X5A ANEMIA ASSOCIATED WITH CHEMOTHERAPY: ICD-10-CM

## 2023-05-30 DIAGNOSIS — R39.9 LOWER URINARY TRACT SYMPTOMS (LUTS): ICD-10-CM

## 2023-05-30 DIAGNOSIS — C18.9 METASTATIC COLON CANCER TO LIVER: Primary | ICD-10-CM

## 2023-05-30 DIAGNOSIS — N17.9 AKI (ACUTE KIDNEY INJURY): ICD-10-CM

## 2023-05-30 DIAGNOSIS — C78.7 METASTATIC COLON CANCER TO LIVER: Primary | ICD-10-CM

## 2023-05-30 DIAGNOSIS — T45.1X5A IMMUNODEFICIENCY DUE TO CHEMOTHERAPY: ICD-10-CM

## 2023-05-30 DIAGNOSIS — Z79.899 IMMUNODEFICIENCY DUE TO CHEMOTHERAPY: ICD-10-CM

## 2023-05-30 DIAGNOSIS — R52 PAIN: ICD-10-CM

## 2023-05-30 DIAGNOSIS — K59.03 DRUG-INDUCED CONSTIPATION: ICD-10-CM

## 2023-05-30 PROCEDURE — 3008F PR BODY MASS INDEX (BMI) DOCUMENTED: ICD-10-PCS | Mod: CPTII,S$GLB,, | Performed by: INTERNAL MEDICINE

## 2023-05-30 PROCEDURE — 3074F SYST BP LT 130 MM HG: CPT | Mod: CPTII,S$GLB,, | Performed by: INTERNAL MEDICINE

## 2023-05-30 PROCEDURE — 99215 PR OFFICE/OUTPT VISIT, EST, LEVL V, 40-54 MIN: ICD-10-PCS | Mod: S$GLB,,, | Performed by: INTERNAL MEDICINE

## 2023-05-30 PROCEDURE — 1126F PR PAIN SEVERITY QUANTIFIED, NO PAIN PRESENT: ICD-10-PCS | Mod: CPTII,S$GLB,, | Performed by: INTERNAL MEDICINE

## 2023-05-30 PROCEDURE — 1101F PT FALLS ASSESS-DOCD LE1/YR: CPT | Mod: CPTII,S$GLB,, | Performed by: INTERNAL MEDICINE

## 2023-05-30 PROCEDURE — 1126F AMNT PAIN NOTED NONE PRSNT: CPT | Mod: CPTII,S$GLB,, | Performed by: INTERNAL MEDICINE

## 2023-05-30 PROCEDURE — 1101F PR PT FALLS ASSESS DOC 0-1 FALLS W/OUT INJ PAST YR: ICD-10-PCS | Mod: CPTII,S$GLB,, | Performed by: INTERNAL MEDICINE

## 2023-05-30 PROCEDURE — 3074F PR MOST RECENT SYSTOLIC BLOOD PRESSURE < 130 MM HG: ICD-10-PCS | Mod: CPTII,S$GLB,, | Performed by: INTERNAL MEDICINE

## 2023-05-30 PROCEDURE — 3078F PR MOST RECENT DIASTOLIC BLOOD PRESSURE < 80 MM HG: ICD-10-PCS | Mod: CPTII,S$GLB,, | Performed by: INTERNAL MEDICINE

## 2023-05-30 PROCEDURE — 3288F PR FALLS RISK ASSESSMENT DOCUMENTED: ICD-10-PCS | Mod: CPTII,S$GLB,, | Performed by: INTERNAL MEDICINE

## 2023-05-30 PROCEDURE — 3078F DIAST BP <80 MM HG: CPT | Mod: CPTII,S$GLB,, | Performed by: INTERNAL MEDICINE

## 2023-05-30 PROCEDURE — 99215 OFFICE O/P EST HI 40 MIN: CPT | Mod: S$GLB,,, | Performed by: INTERNAL MEDICINE

## 2023-05-30 PROCEDURE — 3008F BODY MASS INDEX DOCD: CPT | Mod: CPTII,S$GLB,, | Performed by: INTERNAL MEDICINE

## 2023-05-30 PROCEDURE — 99999 PR PBB SHADOW E&M-EST. PATIENT-LVL III: ICD-10-PCS | Mod: PBBFAC,,, | Performed by: INTERNAL MEDICINE

## 2023-05-30 PROCEDURE — 1159F PR MEDICATION LIST DOCUMENTED IN MEDICAL RECORD: ICD-10-PCS | Mod: CPTII,S$GLB,, | Performed by: INTERNAL MEDICINE

## 2023-05-30 PROCEDURE — 3288F FALL RISK ASSESSMENT DOCD: CPT | Mod: CPTII,S$GLB,, | Performed by: INTERNAL MEDICINE

## 2023-05-30 PROCEDURE — 99999 PR PBB SHADOW E&M-EST. PATIENT-LVL III: CPT | Mod: PBBFAC,,, | Performed by: INTERNAL MEDICINE

## 2023-05-30 PROCEDURE — 1159F MED LIST DOCD IN RCRD: CPT | Mod: CPTII,S$GLB,, | Performed by: INTERNAL MEDICINE

## 2023-06-06 ENCOUNTER — SPECIALTY PHARMACY (OUTPATIENT)
Dept: PHARMACY | Facility: CLINIC | Age: 73
End: 2023-06-06
Payer: MEDICARE

## 2023-06-06 NOTE — TELEPHONE ENCOUNTER
Based on last refill of capecitabine, pt will not be due for his next cycle until 6/19. Called and spoke to daughter who confirmed this as well. Will move refill call accordingly. Daughter was agreeable for call back next Monday.

## 2023-06-08 RX ORDER — ONDANSETRON 4 MG/1
4 TABLET, FILM COATED ORAL EVERY 8 HOURS PRN
Qty: 30 TABLET | Refills: 3 | Status: SHIPPED | OUTPATIENT
Start: 2023-06-08

## 2023-06-12 ENCOUNTER — PATIENT MESSAGE (OUTPATIENT)
Dept: PHARMACY | Facility: CLINIC | Age: 73
End: 2023-06-12
Payer: MEDICARE

## 2023-06-14 ENCOUNTER — OFFICE VISIT (OUTPATIENT)
Dept: SURGERY | Facility: CLINIC | Age: 73
End: 2023-06-14
Payer: MEDICARE

## 2023-06-14 VITALS
HEIGHT: 67 IN | SYSTOLIC BLOOD PRESSURE: 109 MMHG | BODY MASS INDEX: 13.1 KG/M2 | DIASTOLIC BLOOD PRESSURE: 72 MMHG | HEART RATE: 83 BPM | WEIGHT: 83.5 LBS

## 2023-06-14 DIAGNOSIS — C18.9 METASTATIC COLON CANCER TO LIVER: Primary | ICD-10-CM

## 2023-06-14 DIAGNOSIS — C78.7 METASTATIC COLON CANCER TO LIVER: Primary | ICD-10-CM

## 2023-06-14 PROCEDURE — 99999 PR PBB SHADOW E&M-EST. PATIENT-LVL III: ICD-10-PCS | Mod: PBBFAC,,, | Performed by: SURGERY

## 2023-06-14 PROCEDURE — 99499 NO LOS: ICD-10-PCS | Mod: S$GLB,,, | Performed by: SURGERY

## 2023-06-14 PROCEDURE — 99999 PR PBB SHADOW E&M-EST. PATIENT-LVL III: CPT | Mod: PBBFAC,,, | Performed by: SURGERY

## 2023-06-14 PROCEDURE — 99499 UNLISTED E&M SERVICE: CPT | Mod: S$GLB,,, | Performed by: SURGERY

## 2023-06-14 NOTE — PROGRESS NOTES
Encounter Date:  3/8/2023     Patient ID: Javier Downs       Age:  72 y.o.    :  1950     Chief Complaint:  followup of colon cancer metastasized to liver     Interval History:  Mr. Downs returns to clinic with colon cancer metastasized to liver. He was initially diagnosed in  and has undergone 43 cycles of FOLFOX, right hemicolectomy with Dr. Bonilla in 10/2022, and is now s/p 3 Y90 treatments to the liver mets with the most recent on 23. He is being followed by Dr. Schuster.      He reports that he is feeling better since his last Y90. He is tolerating chemotherapy well. Denies N/V/D, abdominal pain. Is tolerating a regular diet.      New Data:  Imaging:  I personally reviewed the following images:   3/4/23: MRI Abd:  Impression:  Interval radioembolization of right hepatic lobe metastasis noting extensive heterogeneous signal and enhancement throughout treatment site.  Recommend continued close follow-up.  No definite new lesion.     Trace right-sided pleural effusion.     Additional findings as above.     23: CT Chest A/P:  Impression:  Postoperative changes from right hemicolectomy for colon cancer.     Multiple liver metastases, slightly decreased in size after radioembolization.  No new metastases in the chest, abdomen, or pelvis.     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     Other findings as described.     22: CT A/P:  Impression:  1. Significantly worsening hepatic metastatic disease.  2. 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  3. Postoperative changes of partial colectomy.  No evidence to suggest an anastomotic leak, bowel obstruction, or ileus.  4. Mild abdominopelvic ascites.  5. Mild urinary bladder wall thickening, correlate with urinalysis to exclude cystitis.  6. Colonic diverticulosis.     Labs:        Chemistry               Component Value Date/Time      2023 0934      K 3.7 03/09/2023 0934      03/09/2023 0934     CO2 23 03/09/2023 0934     BUN 9 03/09/2023 0934     CREATININE 0.7 03/09/2023 0934      03/09/2023 0934               Component Value Date/Time     CALCIUM 8.8 03/09/2023 0934     ALKPHOS 176 (H) 03/09/2023 0934     AST 23 03/09/2023 0934     ALT 10 03/09/2023 0934     BILITOT 0.8 03/09/2023 0934     ESTGFRAFRICA >60.0 07/21/2022 1053     EGFRNONAA >60.0 07/21/2022 1053                Lab Results   Component Value Date     WBC 5.99 03/09/2023     HGB 7.6 (L) 03/09/2023     HCT 26.4 (L) 03/09/2023     MCV 87 03/09/2023      03/09/2023           Past Medical History:   Diagnosis Date    MARIA A (acute kidney injury) 8/18/2022    Hypertension      Metastatic colon cancer to liver 4/22/2021            Past Surgical History:   Procedure Laterality Date    COLONOSCOPY N/A 4/20/2021     Procedure: COLONOSCOPY with possible stent;  Surgeon: EDILMA Bonilla MD;  Location: [facility] ([address] [place] [address]);  Service: Colon and Rectal;  Laterality: N/A;    DIAGNOSTIC LAPAROSCOPY N/A 9/7/2022     Procedure: LAPAROSCOPY, DIAGNOSTIC;  Surgeon: Adrian Rodriguez MD;  [facility];  Service: General;  Laterality: N/A;    INSERTION OF TUNNELED CENTRAL VENOUS CATHETER (CVC) WITH SUBCUTANEOUS PORT N/A 5/3/2021     Procedure: [id number];  Surgeon: Francisco Layton MD;  [facility];  Service: Vascular;  Laterality: N/A;    OMENTECTOMY N/A 10/20/2022     Procedure: OMENTECTOMY;  Surgeon: EDILMA Bonilla MD;  [facility];  Service: Colon and Rectal;  Laterality: N/A;    RIGHT HEMICOLECTOMY N/A 10/20/2022     Procedure: HEMICOLECTOMY, RIGHT, extended;  Surgeon: EDILMA Bonilla MD;  Location: [facility] OR [address] [place] [address];  Service: Colon and Rectal;  Laterality: N/A;  Extended right hemicolectomy CONSENT IN AM             Current Outpatient Medications on File Prior to Visit   Medication Sig Dispense Refill    amLODIPine (NORVASC) 10 MG tablet  Take 1 tablet (10 mg total) by mouth once daily. 90 tablet 3    methocarbamoL (ROBAXIN) 500 MG Tab Take 1 tablet (500 mg total) by mouth 3 (three) times daily. 90 tablet 0    tamsulosin (FLOMAX) 0.4 mg Cap Take 1 capsule (0.4 mg total) by mouth once daily. 30 capsule 5    acetaminophen (TYLENOL) 500 MG tablet Take 1 tablet (500 mg total) by mouth every 6 (six) hours as needed for Pain (alternate with ibuprofen). (Patient not taking: Reported on 3/8/2023)   0    ibuprofen (ADVIL,MOTRIN) 400 MG tablet Take 1 tablet (400 mg total) by mouth every 6 (six) hours as needed for Other (pain). Alternate with tylenol (Patient not taking: Reported on 3/8/2023)        ondansetron (ZOFRAN) 4 MG tablet Take 1 tablet (4 mg total) by mouth every 8 (eight) hours as needed for Nausea. (Patient not taking: Reported on 3/8/2023) 30 tablet 3    oxyCODONE (ROXICODONE) 5 MG immediate release tablet Take 1 tablet (5 mg total) by mouth every 6 (six) hours as needed for Pain. (Patient not taking: Reported on 3/8/2023) 20 tablet 0      No current facility-administered medications on file prior to visit.      Review of patient's allergies indicates:  No Known Allergies     Family History:  His family history is not on file.      Social History:   reports that he has never smoked. He has never used smokeless tobacco. He reports that he does not currently use alcohol. He reports that he does not use drugs.      ROS:  Pertinent positive/negatives detailed in HPI, all other systems negative.     Constitutional:  Non-toxic, no acute distress.    Neck:  Trachea midline,  FROM  Resp:  Easy work of breathing  Abd:  Soft, non-tender, no masses, no ascites  Musculoskeletal:  Ambulatory, normal gait, no muscle wasting.  Extremities are symmetrical without lymphedema.  Neuro:  No gross deficits  Psych:  Awake, alert, oriented.  Answers and asks questions appropriately         ICD-10-CM ICD-9-CM     1. Metastatic colon cancer to liver  C18.9 153.9        C78.7 197.7            Plan   Mr. Downs returns to clinic with colon cancer metastasized to liver. He was initially diagnosed in 2021 and has undergone >40 cycles of FOLFOX, right hemicolectomy with Dr. Bonilla in 10/2022, and is now s/p 4 Y90 treatments to the liver mets with the most recent on 5/23. He is being followed by Dr. Schuster. He has tolerated chemo well. CEA elevated at 40, and platelets and liver function wnl. We discussed his most recent imaging. We discussed that would need a right hepatectomy and that it is technically resectable it would be very high risk. 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 He has replaced L hepatic artery anatomy. His tumor encroaches into segment 4 and would require MHV resection as well, so this is really an extended R hepatectomy.    We had discussed PVE at conference, but it was felt that continuing Y90 would be of more benefit. Plan for open R hepatectomy, understanding the possibility of unresectability and significant liver failure post operatively.      Adrian Rodriguez MD  Upper GI / Hepatobiliary Surgical Oncology  [facility] [place], [place]  Office: [phone]  Fax: [phone]

## 2023-06-15 ENCOUNTER — TELEPHONE (OUTPATIENT)
Dept: SURGERY | Facility: CLINIC | Age: 73
End: 2023-06-15
Payer: MEDICARE

## 2023-06-15 ENCOUNTER — PATIENT MESSAGE (OUTPATIENT)
Dept: SURGERY | Facility: CLINIC | Age: 73
End: 2023-06-15
Payer: MEDICARE

## 2023-06-15 NOTE — TELEPHONE ENCOUNTER
Call placed to pt. Left message with request to call back to schedule follow up appt after MRI. Call back number provided.

## 2023-06-15 NOTE — TELEPHONE ENCOUNTER
Specialty Pharmacy - Refill Coordination    Specialty Medication Orders Linked to Encounter      Flowsheet Row Most Recent Value   Medication #1 capecitabine (XELODA) 500 MG Tab (Order#[id number], Rx#[id number])            Refill Questions - Documented Responses      Flowsheet Row Most Recent Value   Patient Availability and HIPAA Verification    Does patient want to proceed with activity? Yes   HIPAA/medical authority confirmed? Yes   Relationship to patient of person spoken to? Child   Refill Screening Questions    Changes to allergies? No   Changes to medications? No   New conditions since last clinic visit? No   Unplanned office visit, urgent care, ED, or hospital admission in the last 4 weeks? No   How does patient/caregiver feel medication is working? Very good   Financial problems or insurance changes? No   How many doses of your specialty medications were missed in the last 4 weeks? 0   Would patient like to speak to a pharmacist? No   When does the patient need to receive the medication? 06/19/23   Refill Delivery Questions    How will the patient receive the medication? Pickup   When does the patient need to receive the medication? 06/19/23   Shipping Address Home   Address in [facility] confirmed and updated if neccessary? Yes   Expected Copay ($) 0   Is the patient able to afford the medication copay? Yes   Payment Method zero copay   Days supply of Refill 28   Supplies needed? No supplies needed   Refill activity completed? Yes   Refill activity plan Refill scheduled   Shipment/Pickup Date: 06/16/23            Current Outpatient Medications   Medication Sig    acetaminophen (TYLENOL) 500 MG tablet Take 1 tablet (500 mg total) by mouth every 6 (six) hours as needed for Pain (alternate with ibuprofen). (Patient not taking: Reported on 3/8/2023)    amLODIPine (NORVASC) 10 MG tablet Take 1 tablet (10 mg total) by mouth once daily.    capecitabine (XELODA) 500 MG Tab Take 3 tablets (1,500 mg) by mouth  twice daily with food on days 1 thru 7 of 14 day cycles. (Patient not taking: Reported on 6/14/2023.)    ibuprofen (ADVIL,MOTRIN) 400 MG tablet Take 1 tablet (400 mg total) by mouth every 6 (six) hours as needed for Other (pain). Alternate with tylenol (Patient not taking: Reported on 6/14/2023)    ondansetron (ZOFRAN) 4 MG tablet Take 1 tablet (4 mg total) by mouth every 8 (eight) hours as needed for Nausea.    oxyCODONE (ROXICODONE) 5 MG immediate release tablet Take 1 tablet (5 mg total) by mouth every 6 (six) hours as needed for Pain. (Patient not taking: Reported on 3/8/2023)    tamsulosin (FLOMAX) 0.4 mg Cap Take 1 capsule (0.4 mg total) by mouth once daily. (Patient not taking: Reported on 4/3/2023)   Last reviewed on 6/14/2023 11:09 AM by Edilma Hill MA    Review of patient's allergies indicates:  No Known Allergies Last reviewed on  6/14/2023 11:08 AM by Edilma Hill      Tasks added this encounter   6/17/2023 - Pickup Reminder   Tasks due within next 3 months   No tasks due.     Angie Donaldson, PharmD  [facility]  [address] [place], [place] [address]  [facility] [address]  Phone: [phone]  Fax: [phone]

## 2023-06-19 ENCOUNTER — HOSPITAL ENCOUNTER (OUTPATIENT)
Dept: RADIOLOGY | Facility: HOSPITAL | Age: 73
Discharge: HOME | End: 2023-06-19
Payer: MEDICARE

## 2023-06-19 DIAGNOSIS — C78.7 COLON CANCER METASTASIZED TO LIVER: ICD-10-CM

## 2023-06-19 DIAGNOSIS — C18.9 COLON CANCER METASTASIZED TO LIVER: ICD-10-CM

## 2023-06-19 PROCEDURE — 74183 MRI ABD W/O CNTR FLWD CNTR: CPT | Mod: 26,,, | Performed by: RADIOLOGY

## 2023-06-19 PROCEDURE — A9585 GADOBUTROL INJECTION: HCPCS

## 2023-06-19 PROCEDURE — 25500020 PHARM REV CODE 255

## 2023-06-19 PROCEDURE — 74183 MRI ABDOMEN W WO CONTRAST: ICD-10-PCS | Mod: 26,,, | Performed by: RADIOLOGY

## 2023-06-19 PROCEDURE — 74183 MRI ABD W/O CNTR FLWD CNTR: CPT | Mod: TC

## 2023-06-19 RX ORDER — GADOBUTROL 604.72 MG/ML
10 INJECTION INTRAVENOUS
Status: COMPLETED | OUTPATIENT
Start: 2023-06-19 | End: 2023-06-19

## 2023-06-19 RX ADMIN — GADOBUTROL 10 ML: 604.72 INJECTION INTRAVENOUS at 12:06

## 2023-06-27 ENCOUNTER — OFFICE VISIT (OUTPATIENT)
Dept: INTERVENTIONAL RADIOLOGY/VASCULAR | Facility: CLINIC | Age: 73
End: 2023-06-27
Payer: MEDICARE

## 2023-06-27 ENCOUNTER — OFFICE VISIT (OUTPATIENT)
Dept: HEMATOLOGY/ONCOLOGY | Facility: CLINIC | Age: 73
End: 2023-06-27
Payer: MEDICARE

## 2023-06-27 VITALS
WEIGHT: 119.06 LBS | OXYGEN SATURATION: 100 % | HEART RATE: 90 BPM | DIASTOLIC BLOOD PRESSURE: 61 MMHG | TEMPERATURE: 98 F | HEIGHT: 67 IN | RESPIRATION RATE: 18 BRPM | BODY MASS INDEX: 18.69 KG/M2 | SYSTOLIC BLOOD PRESSURE: 100 MMHG

## 2023-06-27 VITALS
SYSTOLIC BLOOD PRESSURE: 99 MMHG | DIASTOLIC BLOOD PRESSURE: 64 MMHG | HEART RATE: 87 BPM | BODY MASS INDEX: 18.67 KG/M2 | HEIGHT: 67 IN | WEIGHT: 118.94 LBS

## 2023-06-27 DIAGNOSIS — K59.03 DRUG-INDUCED CONSTIPATION: ICD-10-CM

## 2023-06-27 DIAGNOSIS — C18.9 METASTATIC COLON CANCER TO LIVER: Primary | ICD-10-CM

## 2023-06-27 DIAGNOSIS — D49.9 IMMUNODEFICIENCY SECONDARY TO NEOPLASM: ICD-10-CM

## 2023-06-27 DIAGNOSIS — T45.1X5A IMMUNODEFICIENCY DUE TO CHEMOTHERAPY: ICD-10-CM

## 2023-06-27 DIAGNOSIS — D84.821 IMMUNODEFICIENCY DUE TO CHEMOTHERAPY: ICD-10-CM

## 2023-06-27 DIAGNOSIS — C78.7 METASTATIC COLON CANCER TO LIVER: Primary | ICD-10-CM

## 2023-06-27 DIAGNOSIS — E43 SEVERE MALNUTRITION: ICD-10-CM

## 2023-06-27 DIAGNOSIS — N17.9 AKI (ACUTE KIDNEY INJURY): ICD-10-CM

## 2023-06-27 DIAGNOSIS — D84.81 IMMUNODEFICIENCY SECONDARY TO NEOPLASM: ICD-10-CM

## 2023-06-27 DIAGNOSIS — I10 PRIMARY HYPERTENSION: ICD-10-CM

## 2023-06-27 DIAGNOSIS — D64.81 ANEMIA ASSOCIATED WITH CHEMOTHERAPY: ICD-10-CM

## 2023-06-27 DIAGNOSIS — T45.1X5A ANEMIA ASSOCIATED WITH CHEMOTHERAPY: ICD-10-CM

## 2023-06-27 DIAGNOSIS — R52 PAIN: ICD-10-CM

## 2023-06-27 DIAGNOSIS — R39.9 LOWER URINARY TRACT SYMPTOMS (LUTS): ICD-10-CM

## 2023-06-27 DIAGNOSIS — R63.4 WEIGHT DECREASE: ICD-10-CM

## 2023-06-27 DIAGNOSIS — Z79.899 IMMUNODEFICIENCY DUE TO CHEMOTHERAPY: ICD-10-CM

## 2023-06-27 PROCEDURE — 99215 PR OFFICE/OUTPT VISIT, EST, LEVL V, 40-54 MIN: ICD-10-PCS | Mod: S$GLB,,, | Performed by: INTERNAL MEDICINE

## 2023-06-27 PROCEDURE — 99999 PR PBB SHADOW E&M-EST. PATIENT-LVL II: CPT | Mod: PBBFAC,,,

## 2023-06-27 PROCEDURE — 3008F BODY MASS INDEX DOCD: CPT | Mod: CPTII,S$GLB,, | Performed by: RADIOLOGY

## 2023-06-27 PROCEDURE — 99215 OFFICE O/P EST HI 40 MIN: CPT | Mod: S$GLB,,, | Performed by: INTERNAL MEDICINE

## 2023-06-27 PROCEDURE — 1159F MED LIST DOCD IN RCRD: CPT | Mod: CPTII,S$GLB,, | Performed by: INTERNAL MEDICINE

## 2023-06-27 PROCEDURE — 1101F PR PT FALLS ASSESS DOC 0-1 FALLS W/OUT INJ PAST YR: ICD-10-PCS | Mod: CPTII,S$GLB,, | Performed by: INTERNAL MEDICINE

## 2023-06-27 PROCEDURE — 99213 OFFICE O/P EST LOW 20 MIN: CPT | Mod: S$GLB,,, | Performed by: RADIOLOGY

## 2023-06-27 PROCEDURE — 99999 PR PBB SHADOW E&M-EST. PATIENT-LVL III: ICD-10-PCS | Mod: PBBFAC,,, | Performed by: INTERNAL MEDICINE

## 2023-06-27 PROCEDURE — 99999 PR PBB SHADOW E&M-EST. PATIENT-LVL II: ICD-10-PCS | Mod: PBBFAC,,,

## 2023-06-27 PROCEDURE — 3078F PR MOST RECENT DIASTOLIC BLOOD PRESSURE < 80 MM HG: ICD-10-PCS | Mod: CPTII,S$GLB,, | Performed by: RADIOLOGY

## 2023-06-27 PROCEDURE — 3288F PR FALLS RISK ASSESSMENT DOCUMENTED: ICD-10-PCS | Mod: CPTII,S$GLB,, | Performed by: INTERNAL MEDICINE

## 2023-06-27 PROCEDURE — 3008F PR BODY MASS INDEX (BMI) DOCUMENTED: ICD-10-PCS | Mod: CPTII,S$GLB,, | Performed by: RADIOLOGY

## 2023-06-27 PROCEDURE — 3288F FALL RISK ASSESSMENT DOCD: CPT | Mod: CPTII,S$GLB,, | Performed by: INTERNAL MEDICINE

## 2023-06-27 PROCEDURE — 99213 PR OFFICE/OUTPT VISIT, EST, LEVL III, 20-29 MIN: ICD-10-PCS | Mod: S$GLB,,, | Performed by: RADIOLOGY

## 2023-06-27 PROCEDURE — 3078F DIAST BP <80 MM HG: CPT | Mod: CPTII,S$GLB,, | Performed by: RADIOLOGY

## 2023-06-27 PROCEDURE — 1101F PT FALLS ASSESS-DOCD LE1/YR: CPT | Mod: CPTII,S$GLB,, | Performed by: INTERNAL MEDICINE

## 2023-06-27 PROCEDURE — 1126F AMNT PAIN NOTED NONE PRSNT: CPT | Mod: CPTII,S$GLB,, | Performed by: INTERNAL MEDICINE

## 2023-06-27 PROCEDURE — 99999 PR PBB SHADOW E&M-EST. PATIENT-LVL III: CPT | Mod: PBBFAC,,, | Performed by: INTERNAL MEDICINE

## 2023-06-27 PROCEDURE — 1126F AMNT PAIN NOTED NONE PRSNT: CPT | Mod: CPTII,S$GLB,, | Performed by: RADIOLOGY

## 2023-06-27 PROCEDURE — 3074F SYST BP LT 130 MM HG: CPT | Mod: CPTII,S$GLB,, | Performed by: RADIOLOGY

## 2023-06-27 PROCEDURE — 3078F PR MOST RECENT DIASTOLIC BLOOD PRESSURE < 80 MM HG: ICD-10-PCS | Mod: CPTII,S$GLB,, | Performed by: INTERNAL MEDICINE

## 2023-06-27 PROCEDURE — 3074F PR MOST RECENT SYSTOLIC BLOOD PRESSURE < 130 MM HG: ICD-10-PCS | Mod: CPTII,S$GLB,, | Performed by: RADIOLOGY

## 2023-06-27 PROCEDURE — 3074F PR MOST RECENT SYSTOLIC BLOOD PRESSURE < 130 MM HG: ICD-10-PCS | Mod: CPTII,S$GLB,, | Performed by: INTERNAL MEDICINE

## 2023-06-27 PROCEDURE — 1159F PR MEDICATION LIST DOCUMENTED IN MEDICAL RECORD: ICD-10-PCS | Mod: CPTII,S$GLB,, | Performed by: INTERNAL MEDICINE

## 2023-06-27 PROCEDURE — 1126F PR PAIN SEVERITY QUANTIFIED, NO PAIN PRESENT: ICD-10-PCS | Mod: CPTII,S$GLB,, | Performed by: RADIOLOGY

## 2023-06-27 PROCEDURE — 1126F PR PAIN SEVERITY QUANTIFIED, NO PAIN PRESENT: ICD-10-PCS | Mod: CPTII,S$GLB,, | Performed by: INTERNAL MEDICINE

## 2023-06-27 PROCEDURE — 3008F PR BODY MASS INDEX (BMI) DOCUMENTED: ICD-10-PCS | Mod: CPTII,S$GLB,, | Performed by: INTERNAL MEDICINE

## 2023-06-27 PROCEDURE — 3074F SYST BP LT 130 MM HG: CPT | Mod: CPTII,S$GLB,, | Performed by: INTERNAL MEDICINE

## 2023-06-27 PROCEDURE — 3008F BODY MASS INDEX DOCD: CPT | Mod: CPTII,S$GLB,, | Performed by: INTERNAL MEDICINE

## 2023-06-27 PROCEDURE — 3078F DIAST BP <80 MM HG: CPT | Mod: CPTII,S$GLB,, | Performed by: INTERNAL MEDICINE

## 2023-06-27 NOTE — PROGRESS NOTES
"[facility]  [facility]  Hematology/Medical Oncology Clinic     PATIENT: Javier Downs  MRN: [id number]  DATE: 6/27/2023    Diagnosis: Transverse colon metastatic cancer to the liver     Oncological history:   04/20/2021: metastatic colon cancer to the liver, moderately differentiated, pMMR  05/06/2021: C1 mFOLFOX + Pema  05/20/2021: C2 mFOLFOX + Pema  06/03/2021: C3 mFOLFOX + Pema   06/17/2021: C4 mFOLFOX + Pema  07/01/2021: C5 mFOLFOX + Pema  07/15/2021: C6 mFOLFOX + Pema  Restaging CT CAP: significant improvement of lesions   07/29/2021: C7 mFOLFOX + Pema   08/12/2021: Switched to maintenance  5FU+Pema (C8)  06/23/2022: C30 maintenance 5FU+Pema  10/20/2022: R hemicolectomy with Dr. Bonilla  12/30/2022: Y-90 to R liver  1/27/2023: Y-90 to R liver  5/17/2023: Y-90 to R liver    Oncological History copied from medical chart:   Mr. Downs is a 73 y.o. male   70 years old pleasant AA gentleman, with history of hypertension, presenting with two weeks duration of abdominal cramps, diarrhea, nausea and vomiting. His symptoms started gradually with intermittent generalize crampy abdominal pain, worse with food. That was associated with nausea, reflux and occaisonal vomiting. He tried pepto-bismol and imodium with no relief, and hence he presented to ER.   He lost significant amount of weight, but cannot quantify the amount or the time period. He has also been feeling weaker than usual.   He hasn't seen a healthcare provider in over than 10 years. He has never had a colonoscopy.   In the ER. His labs were significant of microcytic anemia of 9.7 g/dl, MCV 77, and Platelets counts of 495. CT of the abdomen and pelvis showed " Large mass in the transverse colon highly suspicious for adenocarcinoma resulting in at least high-grade partial obstruction with dilation of proximal colon and small bowel. Soft tissue nodules in the adjacent mesentery are suspicious for pily metastases and/or mesenteric " "implants.  Numerous hepatic masses measuring up to 11.2 cm most likely related to metastatic disease.  There also several small subcapsular lesions which could reflect additional metastatic disease"   CT chest was negative to metastatic disease.   He underwent colonoscopy and stent placement. 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 HER 2 negative, VIRI/SHERI NGS was cancelled due to insufficient quantity   Guardant 360 was sent, negative for KRAS, NRAS, BRAF     He started palliative chemotherapy with FOLFOX+Pema     Interval History:   He presents today with his daughter and sister for follow-up.  He started Xeloda on 4/24/23.  He has tolerated this very well without significant fatigue, nausea, diarrhea or other side effects.  He has mild dryness in his hands and feet without peeling. He feels improved energy since being on Xeloda. Underwent Y-90 on 5/17/23.  Tolerated well without pain or fatigue.  He saw Dr. Rodriguez two weeks ago for discussion of possible R hepatectomy.  Wanted to get MRI first.  He is feeling well.  Reports good appetite, normal bowel movements, no abdominal pain or nausea.  Has mild dryness in his hands.  No mouth sores.  Is on his off week from Xeloda.    ECOG status is 1.       Past Medical History:   Past Medical History:   Diagnosis Date    MARIA A (acute kidney injury) 8/18/2022    Hypertension     Metastatic colon cancer to liver 4/22/2021       Past Surgical HIstory:   Past Surgical History:   Procedure Laterality Date    COLONOSCOPY N/A 4/20/2021    Procedure: COLONOSCOPY with possible stent;  Surgeon: EDILMA Bonilla MD;  Location: [facility] ([address] [place] [address]);  Service: Colon and Rectal;  Laterality: N/A;    DIAGNOSTIC LAPAROSCOPY N/A 9/7/2022    Procedure: LAPAROSCOPY, DIAGNOSTIC;  Surgeon: Adrian Rodriguez MD;  [facility];  " Service: General;  Laterality: N/A;    INSERTION OF TUNNELED CENTRAL VENOUS CATHETER (CVC) WITH SUBCUTANEOUS PORT N/A 5/3/2021    Procedure: [id number];  Surgeon: Francisco Layton MD;  Location: [facility] OR 2ND FLR;  Service: Vascular;  Laterality: N/A;    OMENTECTOMY N/A 10/20/2022    Procedure: OMENTECTOMY;  Surgeon: EDILMA Bonilla MD;  Location: [facility] OR 2ND FLR;  Service: Colon and Rectal;  Laterality: N/A;    RIGHT HEMICOLECTOMY N/A 10/20/2022    Procedure: HEMICOLECTOMY, RIGHT, extended;  Surgeon: EDILMA Bonilla MD;  Location: [facility] OR 2ND FLR;  Service: Colon and Rectal;  Laterality: N/A;  Extended right hemicolectomy CONSENT IN AM       Family History: No family history on file.    Social History:  reports that he has never smoked. He has never used smokeless tobacco. He reports that he does not currently use alcohol. He reports that he does not use drugs.    Allergies:  Review of patient's allergies indicates:  No Known Allergies    Medications:  Current Outpatient Medications   Medication Sig Dispense Refill    acetaminophen (TYLENOL) 500 MG tablet Take 1 tablet (500 mg total) by mouth every 6 (six) hours as needed for Pain (alternate with ibuprofen). (Patient not taking: Reported on 3/8/2023)  0    amLODIPine (NORVASC) 10 MG tablet Take 1 tablet (10 mg total) by mouth once daily. 90 tablet 3    capecitabine (XELODA) 500 MG Tab Take 3 tablets (1,500 mg) by mouth twice daily with food on days 1 thru 7 of 14 day cycles. (Patient not taking: Reported on 6/14/2023.) 84 tablet 5    ibuprofen (ADVIL,MOTRIN) 400 MG tablet Take 1 tablet (400 mg total) by mouth every 6 (six) hours as needed for Other (pain). Alternate with tylenol (Patient not taking: Reported on 6/14/2023)      ondansetron (ZOFRAN) 4 MG tablet Take 1 tablet (4 mg total) by mouth every 8 (eight) hours as needed for Nausea. 30 tablet 3    oxyCODONE (ROXICODONE) 5 MG immediate release tablet Take 1 tablet (5 mg total) by mouth every 6 (six)  "hours as needed for Pain. (Patient not taking: Reported on 3/8/2023) 20 tablet 0    tamsulosin (FLOMAX) 0.4 mg Cap Take 1 capsule (0.4 mg total) by mouth once daily. (Patient not taking: Reported on 4/3/2023) 30 capsule 5     No current facility-administered medications for this visit.       Review of Systems   Constitutional:  Positive for fatigue. Negative for activity change, appetite change, chills, diaphoresis, fever and unexpected weight change.   HENT:  Negative for congestion, mouth sores, nosebleeds, postnasal drip, rhinorrhea, trouble swallowing and voice change.    Eyes:  Negative for pain and visual disturbance.   Respiratory:  Negative for cough, chest tightness, shortness of breath and wheezing.    Cardiovascular:  Negative for chest pain, palpitations and leg swelling.   Gastrointestinal:  Negative for abdominal distention, abdominal pain, blood in stool, constipation, diarrhea, nausea and vomiting.   Genitourinary:  Negative for difficulty urinating, dysuria, flank pain, frequency, hematuria and urgency.   Musculoskeletal:  Negative for arthralgias, back pain and myalgias.   Skin:  Negative for rash and wound.   Neurological:  Positive for numbness. Negative for dizziness, facial asymmetry, weakness, light-headedness and headaches.   Psychiatric/Behavioral:  Negative for agitation, behavioral problems, confusion, decreased concentration, dysphoric mood and sleep disturbance. The patient is not nervous/anxious.    All other systems reviewed and are negative.    ECOG Performance Status: 1     Objective:      Vitals:   Vitals:    06/27/23 0938   BP: 100/61   BP Location: Left arm   Patient Position: Sitting   BP Method: Medium (Automatic)   Pulse: 90   Resp: 18   Temp: 97.6 °F (36.4 °C)   TempSrc: Oral   SpO2: 100%   Weight: 54 kg (119 lb 0.8 oz)   Height: 5' 7" (1.702 m)           Physical Exam  Vitals reviewed.   Constitutional:       General: He is not in acute distress.     Appearance: Normal " appearance. He is not ill-appearing, toxic-appearing or diaphoretic.   HENT:      Head: Normocephalic and atraumatic.      Right Ear: External ear normal.      Left Ear: External ear normal.   Eyes:      General: No scleral icterus.     Extraocular Movements: Extraocular movements intact.      Conjunctiva/sclera: Conjunctivae normal.      Pupils: Pupils are equal, round, and reactive to light.   Cardiovascular:      Rate and Rhythm: Normal rate and regular rhythm.      Pulses: Normal pulses.      Heart sounds: Normal heart sounds. No murmur heard.  Pulmonary:      Effort: Pulmonary effort is normal. No respiratory distress.      Breath sounds: Normal breath sounds. No wheezing.   Chest:      Comments: Port to RCW, no signs of infection.  Abdominal:      General: Abdomen is flat. Bowel sounds are normal. There is no distension.      Palpations: Abdomen is soft. There is no mass.      Tenderness: There is no abdominal tenderness.      Comments: Vertical midline abdominal wound well healed   Musculoskeletal:         General: No swelling or deformity. Normal range of motion.      Right lower leg: No edema.      Left lower leg: No edema.   Skin:     Coloration: Skin is not jaundiced or pale.      Findings: No bruising, erythema or rash.   Neurological:      General: No focal deficit present.      Mental Status: He is alert and oriented to person, place, and time. Mental status is at baseline.      Cranial Nerves: No cranial nerve deficit.      Sensory: No sensory deficit.      Gait: Gait normal.   Psychiatric:         Mood and Affect: Mood normal.         Behavior: Behavior normal.         Thought Content: Thought content normal.         Judgment: Judgment normal.     Laboratory Data:  No visits with results within 1 Week(s) from this visit.   Latest known visit with results is:   Lab Visit on 06/19/2023   Component Date Value Ref Range Status    WBC 06/19/2023 6.88  3.90 - 12.70 K/uL Final    RBC 06/19/2023 3.97 (L)   4.60 - 6.20 M/uL Final    Hemoglobin 06/19/2023 10.0 (L)  14.0 - 18.0 g/dL Final    Hematocrit 06/19/2023 32.8 (L)  40.0 - 54.0 % Final    MCV 06/19/2023 83  82 - 98 fL Final    MCH 06/19/2023 25.2 (L)  27.0 - 31.0 pg Final    MCHC 06/19/2023 30.5 (L)  32.0 - 36.0 g/dL Final    RDW 06/19/2023 22.1 (H)  11.5 - 14.5 % Final    Platelets 06/19/2023 213  150 - 450 K/uL Final    MPV 06/19/2023 9.7  9.2 - 12.9 fL Final    Immature Granulocytes 06/19/2023 0.3  0.0 - 0.5 % Final    Gran # (ANC) 06/19/2023 4.0  1.8 - 7.7 K/uL Final    Immature Grans (Abs) 06/19/2023 0.02  0.00 - 0.04 K/uL Final    Comment: Mild elevation in immature granulocytes is non specific and   can be seen in a variety of conditions including stress response,   acute inflammation, trauma and pregnancy. Correlation with other   laboratory and clinical findings is essential.      Lymph # 06/19/2023 0.8 (L)  1.0 - 4.8 K/uL Final    Mono # 06/19/2023 1.6 (H)  0.3 - 1.0 K/uL Final    Eos # 06/19/2023 0.4  0.0 - 0.5 K/uL Final    Baso # 06/19/2023 0.06  0.00 - 0.20 K/uL Final    nRBC 06/19/2023 0  0 /100 WBC Final    Gran % 06/19/2023 58.0  38.0 - 73.0 % Final    Lymph % 06/19/2023 12.2 (L)  18.0 - 48.0 % Final    Mono % 06/19/2023 22.8 (H)  4.0 - 15.0 % Final    Eosinophil % 06/19/2023 5.8  0.0 - 8.0 % Final    Basophil % 06/19/2023 0.9  0.0 - 1.9 % Final    Differential Method 06/19/2023 Automated   Final    CEA 06/19/2023 125.3 (H)  0.0 - 5.0 ng/mL Final    Comment: CEA Normal Range:  Non-Smokers: 0-3.0 ng/mL  Smokers:     0-5.0 ng/mL    The testing method is a chemiluminescent microparticle immunoassay   manufactured by [employer] and performed on the    or   [employer] system. Values obtained with different assay manufacturers   for   methods may be different and cannot be used interchangeably.      Sodium 06/19/2023 140  136 - 145 mmol/L Final    Potassium 06/19/2023 3.7  3.5 - 5.1 mmol/L Final    Chloride 06/19/2023 105  95 - 110  mmol/L Final    CO2 06/19/2023 26  23 - 29 mmol/L Final    Glucose 06/19/2023 89  70 - 110 mg/dL Final    BUN 06/19/2023 14  8 - 23 mg/dL Final    Creatinine 06/19/2023 0.8  0.5 - 1.4 mg/dL Final    Calcium 06/19/2023 9.2  8.7 - 10.5 mg/dL Final    Total Protein 06/19/2023 7.0  6.0 - 8.4 g/dL Final    Albumin 06/19/2023 2.9 (L)  3.5 - 5.2 g/dL Final    Total Bilirubin 06/19/2023 1.6 (H)  0.1 - 1.0 mg/dL Final    Comment: For infants and newborns, interpretation of results should be based  on gestational age, weight and in agreement with clinical  observations.    Premature Infant recommended reference ranges:  Up to 24 hours.............<8.0 mg/dL  Up to 48 hours............<12.0 mg/dL  3-5 days..................<15.0 mg/dL  6-29 days.................<15.0 mg/dL      Alkaline Phosphatase 06/19/2023 177 (H)  55 - 135 U/L Final    AST 06/19/2023 45 (H)  10 - 40 U/L Final    ALT 06/19/2023 21  10 - 44 U/L Final    Anion Gap 06/19/2023 9  8 - 16 mmol/L Final    eGFR 06/19/2023 >60.0  >60 mL/min/1.73 m^2 Final     Assessment and Plan        1. 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 Immunodeficiency secondary to neoplasm    4. Primary hypertension    5. MARIA A (acute kidney injury)    6. Anemia associated with chemotherapy    7. Pain    8. Drug-induced constipation    9. Weight decrease    10. Severe malnutrition    11. Lower urinary tract symptoms (LUTS)          1-3.  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  Guardant 360 was negative for BRAF, VIRI, HER2 amplification.   Pretreatment CEA 57.  We had a long and sonia discussion with him about his diagnosis. Unfortunately, the disease is not curable but remains treatable and he has good performance status.   Restaging scans after 7 cycles of FOLFOX + Pema showed significant reduction in colonic mass and liver mass.   we switched to maintenance as of cycle 8 with 5FU + Pema  Restaging scans from  March 2022 show stable disease.   MRI on 7/18/22 showing hepatic progression of disease in the right hepatic lobe noted on the exam. CT CAP on 8/26 shows some mild progression in both liver metastases.    Discussed case at colorectal tumor board 8/31/22 and had a diagnostic lap performed by Dr. Rodriguez on 9/7 to assess for any occult peritoneal disease. Findings from the diagnostic lap were negative. Fluid from around from around the primary mass was sent for cytology that was negative for malignancy.   Underwent primary tumor resection on 10/20/22 with Dr. Bonilla.    Meanwhile his liver mets had grown.  We discussed his case at [facility] conference with plans for short term Y-90 and then restarting chemotherapy prior to considering any surgical options to his liver metastases.  He underwent Y-90 delivery on 12/30/22. Tolerated well.   CT CAP on 1/23/23 reviewed at [facility] conference with good response to Y-90, no new lesions.  Underwent second Y-90 on 1/27/23. Can consider R hepatectomy pending follow-up imaging after Y-90.     Received cycle 42 of FOLFOX (omitted oxaliplatin again starting cycle 41 due to neuropathy) on 2/9/23.  Because of 5-FU shortage nationally, we have been holding chemotherapy since mid-February 2023.  If he needs to restart chemotherapy (I.e. no plans for surgical resection), will place him on capecitabine maintenance for duration of 5-FU shortage.     Discussed at colorectal liver mets tumor board 3/16/23.  Plan for repeat Y-90 and then consideration of surgical resection and he will meet with liver transplant team as well.  Underwent Y-90 delivery 5/17/23 with IR.     Has been on maintenance Xeloda since late April 2023.    Met with liver transplant team but ultimately opted not to proceed with transplant as he was concerned about how he would tolerate a major surgery with the life changes that would come after transplant as well. They closed out his case.    He met with Dr. Rodriguez to discuss  whether surgical resection is indicated for his liver mets.  He recommended repeat MRI.  Repeat MRI unfortunately shows some disease progression while on Xeloda maintenance.  Similarly his CEA has risen precipitously, all in keeping with worsening disease.  I will discuss with Dr. Rodriguez but I have tentatively recommended we restart IV chemotherapy with FOLFIRI + Avastin (never had irinotecan).  He will not restart next cycle of Xeloda given change in plan.  Will obtain PGX and obtain insurance auth.    RTC in 2-3 weeks.    4. Hypertension   -- BP normal today.  -- taking Amlodipine .   -- Following with PCP.   -- encouraged him and his daughter to monitor BP and HR closely at home.     5. MARIA A  -- Resolved. Cr normal.   -- Monitor. Encouraged continued hydration.     6. Anemia  -- Hgb stable.  Monitor.  -- No signs of bleeding. Platelets normal.     7-10. Neoplasm related pain, weight loss, constipation  -- Pain very well controlled.  Has oxycodone 5mg to use PRN.  -- Following with nutrition. Encouraged increased protein. Had been drinking Boost daily.  Weight improving.  -- Continue Miralax daily for constipation.    11. Urinary hesitancy  -- Continue Flomax.  -- Reports improvement since starting medication.     Patient is in agreement with the proposed treatment plan. All questions were answered to the patient's satisfaction. Pt knows to call clinic if anything is needed before the next clinic visit.    Bunny Schuster MD  Hematology/Oncology  [facility]      Route Chart for Scheduling    Med Onc Chart Routing      Follow up with physician 4 weeks. for FOLFIRI + Avastin   Follow up with RACHEL 2 weeks. for FOLFIRI + Avastin   Infusion scheduling note    Injection scheduling note    Labs CBC, CMP, CEA and urinalysis   Scheduling:  Preferred lab:  Lab interval: every 2 weeks  & PGX and [facility] blood NGS at next visit   Imaging    Pharmacy appointment    Other referrals

## 2023-06-27 NOTE — PROGRESS NOTES
OUTPATIENT INTERVENTIONAL RADIOLOGY FOLLOW UP    PATIENT:  Javier Downs  MRN:  [id number]  :  1950  DATE OF SERVICE:  2023    REFERRING PHYSICIAN: Dr. Bunny Schuster.      HISTORY OF PRESENT ILLNESS: Javier Downs is a 73 y.o. male with history of colorectal cancer with metastatic disease to the liver. He is status post multiple Y90 radioembolization treatments for hepatic metastatic disease, most recently on 2023. Today, he presents for follow up.     Currently, the patient denies any specific complaints. He reports tolerating the last procedure without significant side effects.    PAST MEDICAL HISTORY:  Past Medical History:   Diagnosis Date    MARIA A (acute kidney injury) 2022    Hypertension     Metastatic colon cancer to liver 2021        PAST SURGICAL HISTORY:  Past Surgical History:   Procedure Laterality Date    COLONOSCOPY N/A 2021    Procedure: COLONOSCOPY with possible stent;  Surgeon: EDILMA Bonilla MD;  Location: [facility] (2ND FLR);  Service: Colon and Rectal;  Laterality: N/A;    DIAGNOSTIC LAPAROSCOPY N/A 2022    Procedure: LAPAROSCOPY, DIAGNOSTIC;  Surgeon: Adrian Rodriguez MD;  [facility];  Service: General;  Laterality: N/A;    INSERTION OF TUNNELED CENTRAL VENOUS CATHETER (CVC) WITH SUBCUTANEOUS PORT N/A 5/3/2021    Procedure: [id number];  Surgeon: Francisco Layton MD;  [facility];  Service: Vascular;  Laterality: N/A;    OMENTECTOMY N/A 10/20/2022    Procedure: OMENTECTOMY;  Surgeon: EDILMA Bonilla MD;  [facility];  Service: Colon and Rectal;  Laterality: N/A;    RIGHT HEMICOLECTOMY N/A 10/20/2022    Procedure: HEMICOLECTOMY, RIGHT, extended;  Surgeon: EDILMA Bonilla MD;  [facility];  Service: Colon and Rectal;  Laterality: N/A;  Extended right hemicolectomy CONSENT IN AM        FAMILY HISTORY:  No family history on file.     SOCIAL HISTORY:  Social History     Socioeconomic History     Marital status:    Tobacco Use    Smoking status: Never    Smokeless tobacco: Never   Substance and Sexual Activity    Alcohol use: Not Currently    Drug use: Never    Sexual activity: Not Currently     Partners: Female        ALLERGIES:   Review of patient's allergies indicates:  No Known Allergies    MEDICATIONS:     Current Outpatient Medications:     acetaminophen (TYLENOL) 500 MG tablet, Take 1 tablet (500 mg total) by mouth every 6 (six) hours as needed for Pain (alternate with ibuprofen). (Patient not taking: Reported on 3/8/2023), Disp: , Rfl: 0    amLODIPine (NORVASC) 10 MG tablet, Take 1 tablet (10 mg total) by mouth once daily., Disp: 90 tablet, Rfl: 3    capecitabine (XELODA) 500 MG Tab, Take 3 tablets (1,500 mg) by mouth twice daily with food on days 1 thru 7 of 14 day cycles. (Patient not taking: Reported on 6/14/2023.), Disp: 84 tablet, Rfl: 5    ibuprofen (ADVIL,MOTRIN) 400 MG tablet, Take 1 tablet (400 mg total) by mouth every 6 (six) hours as needed for Other (pain). Alternate with tylenol (Patient not taking: Reported on 6/14/2023), Disp: , Rfl:     ondansetron (ZOFRAN) 4 MG tablet, Take 1 tablet (4 mg total) by mouth every 8 (eight) hours as needed for Nausea., Disp: 30 tablet, Rfl: 3    oxyCODONE (ROXICODONE) 5 MG immediate release tablet, Take 1 tablet (5 mg total) by mouth every 6 (six) hours as needed for Pain. (Patient not taking: Reported on 3/8/2023), Disp: 20 tablet, Rfl: 0    tamsulosin (FLOMAX) 0.4 mg Cap, Take 1 capsule (0.4 mg total) by mouth once daily. (Patient not taking: Reported on 4/3/2023), Disp: 30 capsule, Rfl: 5    REVIEW OF SYSTEMS:  [x]14 organ review of systems is otherwise negative unless stated above in HPI.    PHYSICAL EXAMINATION:        Vitals:    06/27/23 1035   BP: 99/64   Pulse: 87       GENERAL:  In no apparent distress.    HEENT:  Head is normocephalic.  HEART:  Regular rate and rhythm.   LUNGS:  Symmetric excursions, breathing unlabored.  ABDOMEN:  Soft,  nontender, and nondistended.  EXTREMITIES:  No clubbing, cyanosis, or edema.    NEUROLOGICAL:  Gross nonfocal.     LABORATORY DATA:    Lab Results   Component Value Date     2023    K 3.7 2023     2023    CO2 26 2023    BUN 14 2023      Lab Results   Component Value Date    CALCIUM 9.2 2023    PHOS 3.3 2022      Lab Results   Component Value Date    ALKPHOS 177 (H) 2023    AST 45 (H) 2023    ALT 21 2023    BILITOT 1.6 (H) 2023    ALBUMIN 2.9 (L) 2023      Lab Results   Component Value Date    WBC 6.88 2023    HGB 10.0 (L) 2023    HCT 32.8 (L) 2023    MCV 83 2023     2023        Lab Results   Component Value Date    INR 1.1 2023     Lab Results   Component Value Date    AFP 2.3 2021     No results found for this or any previous visit (from the past 24 hour(s)).  No results found for this or any previous visit (from the past 168 hour(s)).    IMAGIN2023: Applicable imaging, including MRI of 23 personally reviewed and demonstrates treatment related change with interval development of new lesions concerning for worsening of disease.     IMPRESSION/PLAN:   Javier Downs is a 73 y.o. male with history of metastatic colorectal cancer status post Y90 radioembolization for hepatic metastatic disease. MRI of  reviewed and findings were discussed with patient. In light of lab results and worsening imaging findings, patient advised to follow up with oncology team for further treatment planning per Dr. Vidal. The patient is agreeable with this plan. A card with the clinic phone number was provided to the patient should he have additional questions.

## 2023-06-27 NOTE — PLAN OF CARE
DISCONTINUE ON PATHWAY REGIMEN - Colorectal    [id number]        Bevacizumab-xxxx       Oxaliplatin (Eloxatin)       Leucovorin       Fluorouracil       Fluorouracil           Additional Orders: Do not administer bevacizumab for at least 28 days   prior to elective surgery and for at least 28 days following surgery and until   the wound is fully healed.    **Always confirm dose/schedule in your pharmacy ordering system**    REASON: Disease Progression  PRIOR TREATMENT: [id number]  TREATMENT RESPONSE: Partial Response ([place])    START ON PATHWAY REGIMEN - Colorectal    [id number]        Bevacizumab-xxxx       Irinotecan (Camptosar)       Leucovorin       Fluorouracil       Fluorouracil           Additional Orders: Do not administer bevacizumab for at least 28 days   prior to elective surgery and for at least 28 days following surgery and until   the wound is fully healed.    **Always confirm dose/schedule in your pharmacy ordering system**    Patient Characteristics:  Distant Metastases, Nonsurgical Candidate, KRAS/NRAS Wild-Type (BRAF V600   Wild-Type/Unknown), Standard Cytotoxic Therapy, Second Line Standard Cytotoxic   Therapy, Bevacizumab Eligible  Tumor Location: Colon  Therapeutic Status: Distant Metastases  Microsatellite/Mismatch Repair Status: KENIA/pMMR  BRAF Mutation Status: Wild-Type (no mutation)  KRAS/NRAS Mutation Status: Wild-Type (no mutation)  Preferred Therapy Approach: Standard Cytotoxic Therapy  Standard Cytotoxic Line of Therapy: Second Line Standard Cytotoxic Therapy  Bevacizumab Eligibility: Eligible  Intent of Therapy:  Non-Curative / Palliative Intent, Discussed with Patient

## 2023-06-28 ENCOUNTER — OFFICE VISIT (OUTPATIENT)
Dept: SURGERY | Facility: CLINIC | Age: 73
End: 2023-06-28
Payer: MEDICARE

## 2023-06-28 VITALS
HEIGHT: 67 IN | WEIGHT: 119.06 LBS | DIASTOLIC BLOOD PRESSURE: 62 MMHG | OXYGEN SATURATION: 100 % | BODY MASS INDEX: 18.69 KG/M2 | SYSTOLIC BLOOD PRESSURE: 101 MMHG | HEART RATE: 90 BPM

## 2023-06-28 DIAGNOSIS — C78.7 METASTATIC COLON CANCER TO LIVER: Primary | ICD-10-CM

## 2023-06-28 DIAGNOSIS — C18.9 METASTATIC COLON CANCER TO LIVER: Primary | ICD-10-CM

## 2023-06-28 PROCEDURE — 99499 NO LOS: ICD-10-PCS | Mod: S$GLB,,, | Performed by: SURGERY

## 2023-06-28 PROCEDURE — 99999 PR PBB SHADOW E&M-EST. PATIENT-LVL III: ICD-10-PCS | Mod: PBBFAC,,, | Performed by: SURGERY

## 2023-06-28 PROCEDURE — 99999 PR PBB SHADOW E&M-EST. PATIENT-LVL III: CPT | Mod: PBBFAC,,, | Performed by: SURGERY

## 2023-06-28 PROCEDURE — 99499 UNLISTED E&M SERVICE: CPT | Mod: S$GLB,,, | Performed by: SURGERY

## 2023-06-28 NOTE — PROGRESS NOTES
Javier was seen by transplant and is not interested in that therapy. He presents to me to discuss again resection, but on repeat imaging he has clear and fairly aggressive progression on maintenance therapy. He is seeing med onc today, and we discussed resuming chemotherapy to try and achieve some stability. 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   Will continue to be available to re-assess scans, and present for clinical trials should they arise.      Adrian Rodriguez MD  Upper GI / Hepatobiliary Surgical Oncology  [facility] [place], [place]  Office: [phone]  Fax: [phone]       
[Postmenopausal] : history of menopause having occurred

## 2023-06-29 ENCOUNTER — TELEPHONE (OUTPATIENT)
Dept: PHARMACY | Facility: CLINIC | Age: 73
End: 2023-06-29
Payer: MEDICARE

## 2023-06-29 NOTE — TELEPHONE ENCOUNTER
Confirmed via chart notes that pt no longer taking Xeloda. Pt to dispose of remaining medication at [facility]. Will call [facility] if needing alternative disposal locations.

## 2023-07-03 ENCOUNTER — TELEPHONE (OUTPATIENT)
Dept: HEMATOLOGY/ONCOLOGY | Facility: CLINIC | Age: 73
End: 2023-07-03
Payer: MEDICARE

## 2023-07-03 NOTE — TELEPHONE ENCOUNTER
----- Message from [place] Chevalier sent at 7/3/2023  2:41 PM CDT -----  Regarding: genetic testing  Argelia calling from [facility] regarding needing addl info for  a cancer related genetic test, RNA. Pls send all pertinent info and office notes. Caller will also  fax request.    Callback to  / ref# [id number]  Fax

## 2023-07-10 ENCOUNTER — LAB VISIT (OUTPATIENT)
Dept: LAB | Facility: HOSPITAL | Age: 73
End: 2023-07-10
Attending: INTERNAL MEDICINE
Payer: MEDICARE

## 2023-07-10 DIAGNOSIS — C78.7 METASTATIC COLON CANCER TO LIVER: ICD-10-CM

## 2023-07-10 DIAGNOSIS — C18.9 METASTATIC COLON CANCER TO LIVER: ICD-10-CM

## 2023-07-10 LAB
ALBUMIN SERPL BCP-MCNC: 3 G/DL (ref 3.5–5.2)
ALP SERPL-CCNC: 187 U/L (ref 55–135)
ALT SERPL W/O P-5'-P-CCNC: 22 U/L (ref 10–44)
ANION GAP SERPL CALC-SCNC: 12 MMOL/L (ref 8–16)
AST SERPL-CCNC: 61 U/L (ref 10–40)
BASOPHILS # BLD AUTO: 0.1 K/UL (ref 0–0.2)
BASOPHILS NFR BLD: 1.3 % (ref 0–1.9)
BILIRUB SERPL-MCNC: 2.4 MG/DL (ref 0.1–1)
BUN SERPL-MCNC: 14 MG/DL (ref 8–23)
CALCIUM SERPL-MCNC: 9.1 MG/DL (ref 8.7–10.5)
CEA SERPL-MCNC: 209.7 NG/ML (ref 0–5)
CHLORIDE SERPL-SCNC: 103 MMOL/L (ref 95–110)
CO2 SERPL-SCNC: 23 MMOL/L (ref 23–29)
CREAT SERPL-MCNC: 0.9 MG/DL (ref 0.5–1.4)
DIFFERENTIAL METHOD: ABNORMAL
EOSINOPHIL # BLD AUTO: 0.3 K/UL (ref 0–0.5)
EOSINOPHIL NFR BLD: 3.5 % (ref 0–8)
ERYTHROCYTE [DISTWIDTH] IN BLOOD BY AUTOMATED COUNT: 22.2 % (ref 11.5–14.5)
EST. GFR  (NO RACE VARIABLE): >60 ML/MIN/1.73 M^2
GLUCOSE SERPL-MCNC: 93 MG/DL (ref 70–110)
HCT VFR BLD AUTO: 36 % (ref 40–54)
HGB BLD-MCNC: 11 G/DL (ref 14–18)
IMM GRANULOCYTES # BLD AUTO: 0.02 K/UL (ref 0–0.04)
IMM GRANULOCYTES NFR BLD AUTO: 0.3 % (ref 0–0.5)
LYMPHOCYTES # BLD AUTO: 1 K/UL (ref 1–4.8)
LYMPHOCYTES NFR BLD: 12.8 % (ref 18–48)
MCH RBC QN AUTO: 25.6 PG (ref 27–31)
MCHC RBC AUTO-ENTMCNC: 30.6 G/DL (ref 32–36)
MCV RBC AUTO: 84 FL (ref 82–98)
MONOCYTES # BLD AUTO: 1.3 K/UL (ref 0.3–1)
MONOCYTES NFR BLD: 16.8 % (ref 4–15)
NEUTROPHILS # BLD AUTO: 4.9 K/UL (ref 1.8–7.7)
NEUTROPHILS NFR BLD: 65.3 % (ref 38–73)
NRBC BLD-RTO: 0 /100 WBC
PLATELET # BLD AUTO: 347 K/UL (ref 150–450)
PMV BLD AUTO: 9.5 FL (ref 9.2–12.9)
POTASSIUM SERPL-SCNC: 3.6 MMOL/L (ref 3.5–5.1)
PROT SERPL-MCNC: 6.9 G/DL (ref 6–8.4)
RBC # BLD AUTO: 4.29 M/UL (ref 4.6–6.2)
SODIUM SERPL-SCNC: 138 MMOL/L (ref 136–145)
WBC # BLD AUTO: 7.5 K/UL (ref 3.9–12.7)

## 2023-07-10 PROCEDURE — 36415 COLL VENOUS BLD VENIPUNCTURE: CPT | Performed by: INTERNAL MEDICINE

## 2023-07-10 PROCEDURE — 82378 CARCINOEMBRYONIC ANTIGEN: CPT | Performed by: INTERNAL MEDICINE

## 2023-07-10 PROCEDURE — 80053 COMPREHEN METABOLIC PANEL: CPT | Performed by: INTERNAL MEDICINE

## 2023-07-10 PROCEDURE — 85025 COMPLETE CBC W/AUTO DIFF WBC: CPT | Performed by: INTERNAL MEDICINE

## 2023-07-11 ENCOUNTER — INFUSION (OUTPATIENT)
Dept: INFUSION THERAPY | Facility: HOSPITAL | Age: 73
End: 2023-07-11
Payer: MEDICARE

## 2023-07-11 ENCOUNTER — CLINICAL SUPPORT (OUTPATIENT)
Dept: HEMATOLOGY/ONCOLOGY | Facility: CLINIC | Age: 73
End: 2023-07-11
Payer: MEDICARE

## 2023-07-11 VITALS
SYSTOLIC BLOOD PRESSURE: 107 MMHG | HEART RATE: 77 BPM | TEMPERATURE: 98 F | BODY MASS INDEX: 18.65 KG/M2 | DIASTOLIC BLOOD PRESSURE: 60 MMHG | WEIGHT: 119.06 LBS | RESPIRATION RATE: 18 BRPM

## 2023-07-11 VITALS
TEMPERATURE: 98 F | OXYGEN SATURATION: 100 % | HEART RATE: 80 BPM | RESPIRATION RATE: 16 BRPM | DIASTOLIC BLOOD PRESSURE: 80 MMHG | SYSTOLIC BLOOD PRESSURE: 109 MMHG

## 2023-07-11 DIAGNOSIS — C18.9 METASTATIC COLON CANCER TO LIVER: Primary | ICD-10-CM

## 2023-07-11 DIAGNOSIS — Z79.899 IMMUNODEFICIENCY DUE TO CHEMOTHERAPY: ICD-10-CM

## 2023-07-11 DIAGNOSIS — C78.7 METASTATIC COLON CANCER TO LIVER: Primary | ICD-10-CM

## 2023-07-11 DIAGNOSIS — N17.9 AKI (ACUTE KIDNEY INJURY): ICD-10-CM

## 2023-07-11 DIAGNOSIS — D84.81 IMMUNODEFICIENCY SECONDARY TO NEOPLASM: ICD-10-CM

## 2023-07-11 DIAGNOSIS — D49.9 IMMUNODEFICIENCY SECONDARY TO NEOPLASM: ICD-10-CM

## 2023-07-11 DIAGNOSIS — T45.1X5A ANEMIA ASSOCIATED WITH CHEMOTHERAPY: ICD-10-CM

## 2023-07-11 DIAGNOSIS — E80.6 HYPERBILIRUBINEMIA: ICD-10-CM

## 2023-07-11 DIAGNOSIS — E43 SEVERE MALNUTRITION: ICD-10-CM

## 2023-07-11 DIAGNOSIS — R63.4 WEIGHT DECREASE: ICD-10-CM

## 2023-07-11 DIAGNOSIS — I10 PRIMARY HYPERTENSION: ICD-10-CM

## 2023-07-11 DIAGNOSIS — R52 PAIN: ICD-10-CM

## 2023-07-11 DIAGNOSIS — K59.03 DRUG-INDUCED CONSTIPATION: ICD-10-CM

## 2023-07-11 DIAGNOSIS — T45.1X5A IMMUNODEFICIENCY DUE TO CHEMOTHERAPY: ICD-10-CM

## 2023-07-11 DIAGNOSIS — D64.81 ANEMIA ASSOCIATED WITH CHEMOTHERAPY: ICD-10-CM

## 2023-07-11 DIAGNOSIS — R39.9 LOWER URINARY TRACT SYMPTOMS (LUTS): ICD-10-CM

## 2023-07-11 DIAGNOSIS — D84.821 IMMUNODEFICIENCY DUE TO CHEMOTHERAPY: ICD-10-CM

## 2023-07-11 PROCEDURE — 96413 CHEMO IV INFUSION 1 HR: CPT

## 2023-07-11 PROCEDURE — 99215 PR OFFICE/OUTPT VISIT, EST, LEVL V, 40-54 MIN: ICD-10-PCS | Mod: S$GLB,,, | Performed by: PHYSICIAN ASSISTANT

## 2023-07-11 PROCEDURE — 25000003 PHARM REV CODE 250: Performed by: INTERNAL MEDICINE

## 2023-07-11 PROCEDURE — 99215 OFFICE O/P EST HI 40 MIN: CPT | Mod: S$GLB,,, | Performed by: PHYSICIAN ASSISTANT

## 2023-07-11 PROCEDURE — 99999 PR PBB SHADOW E&M-EST. PATIENT-LVL III: ICD-10-PCS | Mod: PBBFAC,,, | Performed by: PHYSICIAN ASSISTANT

## 2023-07-11 PROCEDURE — 96367 TX/PROPH/DG ADDL SEQ IV INF: CPT

## 2023-07-11 PROCEDURE — 63600175 PHARM REV CODE 636 W HCPCS: Performed by: INTERNAL MEDICINE

## 2023-07-11 PROCEDURE — 99999 PR PBB SHADOW E&M-EST. PATIENT-LVL III: CPT | Mod: PBBFAC,,, | Performed by: PHYSICIAN ASSISTANT

## 2023-07-11 PROCEDURE — 96416 CHEMO PROLONG INFUSE W/PUMP: CPT

## 2023-07-11 RX ORDER — DIPHENHYDRAMINE HYDROCHLORIDE 50 MG/ML
50 INJECTION INTRAMUSCULAR; INTRAVENOUS ONCE AS NEEDED
Status: CANCELLED | OUTPATIENT
Start: 2023-07-11

## 2023-07-11 RX ORDER — HEPARIN 100 UNIT/ML
500 SYRINGE INTRAVENOUS
Status: CANCELLED | OUTPATIENT
Start: 2023-07-13

## 2023-07-11 RX ORDER — SODIUM CHLORIDE 0.9 % (FLUSH) 0.9 %
10 SYRINGE (ML) INJECTION
Status: CANCELLED | OUTPATIENT
Start: 2023-07-13

## 2023-07-11 RX ORDER — PROCHLORPERAZINE EDISYLATE 5 MG/ML
5 INJECTION INTRAMUSCULAR; INTRAVENOUS ONCE AS NEEDED
Status: CANCELLED
Start: 2023-07-11

## 2023-07-11 RX ORDER — DIPHENHYDRAMINE HYDROCHLORIDE 50 MG/ML
50 INJECTION INTRAMUSCULAR; INTRAVENOUS ONCE AS NEEDED
Status: DISCONTINUED | OUTPATIENT
Start: 2023-07-11 | End: 2023-07-11 | Stop reason: HOSPADM

## 2023-07-11 RX ORDER — EPINEPHRINE 0.3 MG/.3ML
0.3 INJECTION SUBCUTANEOUS ONCE AS NEEDED
Status: CANCELLED | OUTPATIENT
Start: 2023-07-11

## 2023-07-11 RX ORDER — HEPARIN 100 UNIT/ML
500 SYRINGE INTRAVENOUS
Status: CANCELLED | OUTPATIENT
Start: 2023-07-11

## 2023-07-11 RX ORDER — EPINEPHRINE 0.3 MG/.3ML
0.3 INJECTION SUBCUTANEOUS ONCE AS NEEDED
Status: DISCONTINUED | OUTPATIENT
Start: 2023-07-11 | End: 2023-07-11 | Stop reason: HOSPADM

## 2023-07-11 RX ORDER — PROCHLORPERAZINE EDISYLATE 5 MG/ML
5 INJECTION INTRAMUSCULAR; INTRAVENOUS ONCE AS NEEDED
Status: DISCONTINUED | OUTPATIENT
Start: 2023-07-11 | End: 2023-07-11 | Stop reason: HOSPADM

## 2023-07-11 RX ORDER — ATROPINE SULFATE 0.4 MG/ML
0.4 INJECTION, SOLUTION ENDOTRACHEAL; INTRAMEDULLARY; INTRAMUSCULAR; INTRAVENOUS; SUBCUTANEOUS ONCE AS NEEDED
Status: CANCELLED | OUTPATIENT
Start: 2023-07-11

## 2023-07-11 RX ORDER — HEPARIN 100 UNIT/ML
500 SYRINGE INTRAVENOUS
Status: DISCONTINUED | OUTPATIENT
Start: 2023-07-11 | End: 2023-07-11 | Stop reason: HOSPADM

## 2023-07-11 RX ORDER — SODIUM CHLORIDE 0.9 % (FLUSH) 0.9 %
10 SYRINGE (ML) INJECTION
Status: DISCONTINUED | OUTPATIENT
Start: 2023-07-11 | End: 2023-07-11 | Stop reason: HOSPADM

## 2023-07-11 RX ORDER — ATROPINE SULFATE 0.4 MG/ML
0.4 INJECTION, SOLUTION ENDOTRACHEAL; INTRAMEDULLARY; INTRAMUSCULAR; INTRAVENOUS; SUBCUTANEOUS ONCE AS NEEDED
Status: DISCONTINUED | OUTPATIENT
Start: 2023-07-11 | End: 2023-07-11 | Stop reason: HOSPADM

## 2023-07-11 RX ORDER — SODIUM CHLORIDE 0.9 % (FLUSH) 0.9 %
10 SYRINGE (ML) INJECTION
Status: CANCELLED | OUTPATIENT
Start: 2023-07-11

## 2023-07-11 RX ADMIN — DEXAMETHASONE SODIUM PHOSPHATE 0.25 MG: 4 INJECTION, SOLUTION INTRA-ARTICULAR; INTRALESIONAL; INTRAMUSCULAR; INTRAVENOUS; SOFT TISSUE at 11:07

## 2023-07-11 RX ADMIN — BEVACIZUMAB-AWWB 270 MG: 100 INJECTION, SOLUTION INTRAVENOUS at 09:07

## 2023-07-11 RX ADMIN — FLUOROURACIL 3840 MG: 50 INJECTION, SOLUTION INTRAVENOUS at 11:07

## 2023-07-11 NOTE — PLAN OF CARE
1135 pt tolerated avastin infusion without issue, pt with 5 FU infusing via CADD pump at 2.2 ml/hr without issue prior to d/c, pt to rtc Thursday around 0930 for pump d/c, no distress noted upon d/c to home

## 2023-07-11 NOTE — Clinical Note
Hi team. Can we please schedule at [facility] ultrasound for this patient? I have placed orders for him. Thank you

## 2023-07-11 NOTE — PLAN OF CARE
0820 pt here for D1C1 avastin/5 FU pump placement, pt will not receive Irinotecan due to elevated bilirubin level, Cynthia will order liver US pt aware, labs, hx, meds, allergies reviewed, pt with no new complaints at this time, reclined in chair, continue to monitor

## 2023-07-11 NOTE — Clinical Note
Hi team. Can we please try to move his clinic visit and infusion on 8/22 to the afternoon, so that his daughter can bring him in? Can we schedule him with Dr. Schuster at 11am with FOLFIRI plus avastin in the afternoon? Thank you so much.

## 2023-07-11 NOTE — PROGRESS NOTES
"[facility]  [facility]  Hematology/Medical Oncology Clinic     PATIENT: Javier Downs  MRN: [id number]  DATE: 7/11/2023    Diagnosis: Transverse colon metastatic cancer to the liver     Oncological history:   04/20/2021: metastatic colon cancer to the liver, moderately differentiated, pMMR  05/06/2021: C1 mFOLFOX + Pema  05/20/2021: C2 mFOLFOX + Pema  06/03/2021: C3 mFOLFOX + Pema   06/17/2021: C4 mFOLFOX + Pema  07/01/2021: C5 mFOLFOX + Pema  07/15/2021: C6 mFOLFOX + Pema  Restaging CT CAP: significant improvement of lesions   07/29/2021: C7 mFOLFOX + Pema   08/12/2021: Switched to maintenance  5FU+Pema (C8)  06/23/2022: C30 maintenance 5FU+Pema  10/20/2022: R hemicolectomy with Dr. Bonilla  12/30/2022: Y-90 to R liver  1/27/2023: Y-90 to R liver  5/17/2023: Y-90 to R liver    Oncological History copied from medical chart:   Mr. Downs is a 73 y.o. male   70 years old pleasant AA gentleman, with history of hypertension, presenting with two weeks duration of abdominal cramps, diarrhea, nausea and vomiting. His symptoms started gradually with intermittent generalize crampy abdominal pain, worse with food. That was associated with nausea, reflux and occaisonal vomiting. He tried pepto-bismol and imodium with no relief, and hence he presented to ER.   He lost significant amount of weight, but cannot quantify the amount or the time period. He has also been feeling weaker than usual.   He hasn't seen a healthcare provider in over than 10 years. He has never had a colonoscopy.   In the ER. His labs were significant of microcytic anemia of 9.7 g/dl, MCV 77, and Platelets counts of 495. CT of the abdomen and pelvis showed " Large mass in the transverse colon highly suspicious for adenocarcinoma resulting in at least high-grade partial obstruction with dilation of proximal colon and small bowel. Soft tissue nodules in the adjacent mesentery are suspicious for pily metastases and/or mesenteric " "implants.  Numerous hepatic masses measuring up to 11.2 cm most likely related to metastatic disease.  There also several small subcapsular lesions which could reflect additional metastatic disease"   CT chest was negative to metastatic disease.   He underwent colonoscopy and stent placement. 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 HER 2 negative, VIRI/SHERI NGS was cancelled due to insufficient quantity   Guardant 360 was sent, negative for KRAS, NRAS, BRAF     He started palliative chemotherapy with FOLFOX+Pema     He started Xeloda on 4/24/23.  He tolerated this very well without significant fatigue, nausea, diarrhea or other side effects.  He had mild dryness in his hands and feet without peeling. He felt improved energy since being on Xeloda. Underwent Y-90 on 5/17/23.  Tolerated well without pain or fatigue.  He saw Dr. Rodriguez two weeks ago for discussion of possible R hepatectomy.  Wanted to get MRI first. Repeat MRI unfortunately shows some disease progression while on Xeloda maintenance.  Similarly his CEA has risen precipitously, all in keeping with worsening disease. We decided to have him start FOLFIRI plus avastin, as he has not had Irinotecan. Pt was agreeable and he returns today for education.     Interval History:   He presents today with his daughter for follow-up and to start FOLFIRI plus avastin. He is feeling well.  Reports good appetite, normal bowel movements, no abdominal pain or nausea.  Has mild dryness in his hands.  No mouth sores. He is eating and has a decent appetite. No other concerns or complaints today.     ECOG status is 1.       Past Medical History:   Past Medical History:   Diagnosis Date    MARIA A (acute kidney injury) 8/18/2022    Hypertension     Metastatic colon cancer to liver 4/22/2021       Past Surgical HIstory:   Past " Surgical History:   Procedure Laterality Date    COLONOSCOPY N/A 4/20/2021    Procedure: COLONOSCOPY with possible stent;  Surgeon: EDILMA Bonilla MD;  Location: [facility] ENDO (2ND FLR);  Service: Colon and Rectal;  Laterality: N/A;    DIAGNOSTIC LAPAROSCOPY N/A 9/7/2022    Procedure: LAPAROSCOPY, DIAGNOSTIC;  Surgeon: Adrian Rodriguez MD;  Location: [facility] OR 2ND FLR;  Service: General;  Laterality: N/A;    INSERTION OF TUNNELED CENTRAL VENOUS CATHETER (CVC) WITH SUBCUTANEOUS PORT N/A 5/3/2021    Procedure: [id number];  Surgeon: Francisco Layton MD;  Location: [facility] OR 2ND FLR;  Service: Vascular;  Laterality: N/A;    OMENTECTOMY N/A 10/20/2022    Procedure: OMENTECTOMY;  Surgeon: EDILMA Bonilla MD;  Location: [facility] OR 2ND FLR;  Service: Colon and Rectal;  Laterality: N/A;    RIGHT HEMICOLECTOMY N/A 10/20/2022    Procedure: HEMICOLECTOMY, RIGHT, extended;  Surgeon: EDILMA Bonilla MD;  Location: [facility] OR 2ND FLR;  Service: Colon and Rectal;  Laterality: N/A;  Extended right hemicolectomy CONSENT IN AM       Family History: History reviewed. No pertinent family history.    Social History:  reports that he has never smoked. He has never used smokeless tobacco. He reports that he does not currently use alcohol. He reports that he does not use drugs.    Allergies:  Review of patient's allergies indicates:  No Known Allergies    Medications:  Current Outpatient Medications   Medication Sig Dispense Refill    acetaminophen (TYLENOL) 500 MG tablet Take 1 tablet (500 mg total) by mouth every 6 (six) hours as needed for Pain (alternate with ibuprofen). (Patient not taking: Reported on 6/28/2023)  0    amLODIPine (NORVASC) 10 MG tablet Take 1 tablet (10 mg total) by mouth once daily. 90 tablet 3    capecitabine (XELODA) 500 MG Tab Take 3 tablets (1,500 mg) by mouth twice daily with food on days 1 thru 7 of 14 day cycles. (Patient not taking: Reported on 6/28/2023.) 84 tablet 5    ibuprofen (ADVIL,MOTRIN) 400 MG tablet  Take 1 tablet (400 mg total) by mouth every 6 (six) hours as needed for Other (pain). Alternate with tylenol (Patient not taking: Reported on 6/28/2023)      ondansetron (ZOFRAN) 4 MG tablet Take 1 tablet (4 mg total) by mouth every 8 (eight) hours as needed for Nausea. (Patient not taking: Reported on 6/28/2023) 30 tablet 3    oxyCODONE (ROXICODONE) 5 MG immediate release tablet Take 1 tablet (5 mg total) by mouth every 6 (six) hours as needed for Pain. (Patient not taking: Reported on 6/28/2023) 20 tablet 0    tamsulosin (FLOMAX) 0.4 mg Cap Take 1 capsule (0.4 mg total) by mouth once daily. (Patient not taking: Reported on 6/28/2023) 30 capsule 5     No current facility-administered medications for this visit.       Review of Systems   Constitutional:  Positive for fatigue. Negative for activity change, appetite change, chills, diaphoresis, fever and unexpected weight change.   HENT:  Negative for congestion, mouth sores, nosebleeds, postnasal drip, rhinorrhea, trouble swallowing and voice change.    Eyes:  Negative for pain and visual disturbance.   Respiratory:  Negative for cough, chest tightness, shortness of breath and wheezing.    Cardiovascular:  Negative for chest pain, palpitations and leg swelling.   Gastrointestinal:  Negative for abdominal distention, abdominal pain, blood in stool, constipation, diarrhea, nausea and vomiting.   Genitourinary:  Negative for difficulty urinating, dysuria, flank pain, frequency, hematuria and urgency.   Musculoskeletal:  Negative for arthralgias, back pain and myalgias.   Skin:  Negative for rash and wound.        Discoloration to the hands, dryness   Neurological:  Positive for numbness. Negative for dizziness, facial asymmetry, weakness, light-headedness and headaches.   Psychiatric/Behavioral:  Negative for agitation, behavioral problems, confusion, decreased concentration, dysphoric mood and sleep disturbance. The patient is not nervous/anxious.    All other systems  reviewed and are negative.    ECOG Performance Status: 1     Objective:      Vitals:   Vitals:    07/11/23 0800   BP: 109/80   Pulse: 80   Resp: 16   Temp: 97.6 °F (36.4 °C)   SpO2: 100%             Physical Exam  Vitals reviewed.   Constitutional:       General: He is not in acute distress.     Appearance: Normal appearance. He is not ill-appearing, toxic-appearing or diaphoretic.   HENT:      Head: Normocephalic and atraumatic.      Right Ear: External ear normal.      Left Ear: External ear normal.   Eyes:      General: No scleral icterus.     Extraocular Movements: Extraocular movements intact.      Conjunctiva/sclera: Conjunctivae normal.      Pupils: Pupils are equal, round, and reactive to light.   Cardiovascular:      Rate and Rhythm: Normal rate and regular rhythm.      Pulses: Normal pulses.      Heart sounds: Normal heart sounds. No murmur heard.  Pulmonary:      Effort: Pulmonary effort is normal. No respiratory distress.      Breath sounds: Normal breath sounds. No wheezing.   Chest:      Comments: Port to RCW, no signs of infection.  Abdominal:      General: Abdomen is flat. Bowel sounds are normal. There is no distension.      Palpations: Abdomen is soft. There is no mass.      Tenderness: There is no abdominal tenderness.      Comments: Vertical midline abdominal wound well healed   Musculoskeletal:         General: No swelling or deformity. Normal range of motion.      Right lower leg: No edema.      Left lower leg: No edema.   Skin:     Coloration: Skin is not jaundiced or pale.      Findings: No bruising, erythema or rash.   Neurological:      General: No focal deficit present.      Mental Status: He is alert and oriented to person, place, and time. Mental status is at baseline.      Cranial Nerves: No cranial nerve deficit.      Sensory: No sensory deficit.      Gait: Gait normal.   Psychiatric:         Mood and Affect: Mood normal.         Behavior: Behavior normal.         Thought Content:  Thought content normal.         Judgment: Judgment normal.     Laboratory Data:  Lab Visit on 07/10/2023   Component Date Value Ref Range Status    WBC 07/10/2023 7.50  3.90 - 12.70 K/uL Final    RBC 07/10/2023 4.29 (L)  4.60 - 6.20 M/uL Final    Hemoglobin 07/10/2023 11.0 (L)  14.0 - 18.0 g/dL Final    Hematocrit 07/10/2023 36.0 (L)  40.0 - 54.0 % Final    MCV 07/10/2023 84  82 - 98 fL Final    MCH 07/10/2023 25.6 (L)  27.0 - 31.0 pg Final    MCHC 07/10/2023 30.6 (L)  32.0 - 36.0 g/dL Final    RDW 07/10/2023 22.2 (H)  11.5 - 14.5 % Final    Platelets 07/10/2023 347  150 - 450 K/uL Final    MPV 07/10/2023 9.5  9.2 - 12.9 fL Final    Immature Granulocytes 07/10/2023 0.3  0.0 - 0.5 % Final    Gran # (ANC) 07/10/2023 4.9  1.8 - 7.7 K/uL Final    Immature Grans (Abs) 07/10/2023 0.02  0.00 - 0.04 K/uL Final    Comment: Mild elevation in immature granulocytes is non specific and   can be seen in a variety of conditions including stress response,   acute inflammation, trauma and pregnancy. Correlation with other   laboratory and clinical findings is essential.      Lymph # 07/10/2023 1.0  1.0 - 4.8 K/uL Final    Mono # 07/10/2023 1.3 (H)  0.3 - 1.0 K/uL Final    Eos # 07/10/2023 0.3  0.0 - 0.5 K/uL Final    Baso # 07/10/2023 0.10  0.00 - 0.20 K/uL Final    nRBC 07/10/2023 0  0 /100 WBC Final    Gran % 07/10/2023 65.3  38.0 - 73.0 % Final    Lymph % 07/10/2023 12.8 (L)  18.0 - 48.0 % Final    Mono % 07/10/2023 16.8 (H)  4.0 - 15.0 % Final    Eosinophil % 07/10/2023 3.5  0.0 - 8.0 % Final    Basophil % 07/10/2023 1.3  0.0 - 1.9 % Final    Differential Method 07/10/2023 Automated   Final    Sodium 07/10/2023 138  136 - 145 mmol/L Final    Potassium 07/10/2023 3.6  3.5 - 5.1 mmol/L Final    Chloride 07/10/2023 103  95 - 110 mmol/L Final    CO2 07/10/2023 23  23 - 29 mmol/L Final    Glucose 07/10/2023 93  70 - 110 mg/dL Final    BUN 07/10/2023 14  8 - 23 mg/dL Final    Creatinine 07/10/2023 0.9  0.5 - 1.4 mg/dL Final    Calcium  07/10/2023 9.1  8.7 - 10.5 mg/dL Final    Total Protein 07/10/2023 6.9  6.0 - 8.4 g/dL Final    Albumin 07/10/2023 3.0 (L)  3.5 - 5.2 g/dL Final    Total Bilirubin 07/10/2023 2.4 (H)  0.1 - 1.0 mg/dL Final    Comment: For infants and newborns, interpretation of results should be based  on gestational age, weight and in agreement with clinical  observations.    Premature Infant recommended reference ranges:  Up to 24 hours.............<8.0 mg/dL  Up to 48 hours............<12.0 mg/dL  3-5 days..................<15.0 mg/dL  6-29 days.................<15.0 mg/dL      Alkaline Phosphatase 07/10/2023 187 (H)  55 - 135 U/L Final    AST 07/10/2023 61 (H)  10 - 40 U/L Final    ALT 07/10/2023 22  10 - 44 U/L Final    eGFR 07/10/2023 >60.0  >60 mL/min/1.73 m^2 Final    Anion Gap 07/10/2023 12  8 - 16 mmol/L Final    CEA 07/10/2023 209.7 (H)  0.0 - 5.0 ng/mL Final    Comment: CEA Normal Range:  Non-Smokers: 0-3.0 ng/mL  Smokers:     0-5.0 ng/mL    The testing method is a chemiluminescent microparticle immunoassay   manufactured by [employer] and performed on the    or   Preact system. Values obtained with different assay manufacturers   for   methods may be different and cannot be used interchangeably.     Lab Visit on 07/10/2023   Component Date Value Ref Range Status    Specimen UA 07/10/2023 Urine, Clean Catch   Final    Color, UA 07/10/2023 Yellow  Yellow, Straw, [place] Final    Appearance, UA 07/10/2023 Hazy (A)  Clear Final    pH, UA 07/10/2023 6.0  5.0 - 8.0 Final    Specific Gravity, UA 07/10/2023 1.025  1.005 - 1.030 Final    Protein, UA 07/10/2023 1+ (A)  Negative Final    Comment: Recommend a 24 hour urine protein or a urine   protein/creatinine ratio if globulin induced proteinuria is  clinically suspected.      Glucose, UA 07/10/2023 Negative  Negative Final    Ketones, UA 07/10/2023 Trace (A)  Negative Final    Bilirubin (UA) 07/10/2023 1+ (A)  Negative Final    Comment: Positive urine  bilirubin is not confirmed. Correlate with   serum bilirubin and clinical presentation.      Occult Blood UA 07/10/2023 Trace (A)  Negative Final    Nitrite, UA 07/10/2023 Negative  Negative Final    Leukocytes, UA 07/10/2023 Trace (A)  Negative Final    RBC, UA 07/10/2023 1  0 - 4 /hpf Final    WBC, UA 07/10/2023 9 (H)  0 - 5 /hpf Final    Bacteria 07/10/2023 Occasional  None-Occ /hpf Final    Squam Epithel, UA 07/10/2023 1  /hpf Final    Hyaline Casts, UA 07/10/2023 16 (A)  0-1/lpf /lpf Final    Microscopic Comment 07/10/2023 SEE COMMENT   Final    Comment: Other formed elements not mentioned in the report are not   present in the microscopic examination.        Assessment and Plan        1. 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 Immunodeficiency secondary to neoplasm    4. Primary hypertension    5. MARIA A (acute kidney injury)    6. Anemia associated with chemotherapy    7. Pain    8. Drug-induced constipation    9. Weight decrease    10. Severe malnutrition    11. Lower urinary tract symptoms (LUTS)    12. Hyperbilirubinemia            1-3.  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  Guardant 360 was negative for BRAF, VIRI, HER2 amplification.   Pretreatment CEA 57.  We had a long and sonia discussion with him about his diagnosis. Unfortunately, the disease is not curable but remains treatable and he has good performance status.   Restaging scans after 7 cycles of FOLFOX + Pema showed significant reduction in colonic mass and liver mass.   we switched to maintenance as of cycle 8 with 5FU + Pema  Restaging scans from March 2022 show stable disease.   MRI on 7/18/22 showing hepatic progression of disease in the right hepatic lobe noted on the exam. CT CAP on 8/26 shows some mild progression in both liver metastases.    Discussed case at colorectal tumor board 8/31/22 and had a diagnostic lap performed by Dr. Rodriguez on 9/7 to assess for  any occult peritoneal disease. Findings from the diagnostic lap were negative. Fluid from around from around the primary mass was sent for cytology that was negative for malignancy.   Underwent primary tumor resection on 10/20/22 with Dr. Bonilla.    Meanwhile his liver mets had grown.  We discussed his case at [facility] conference with plans for short term Y-90 and then restarting chemotherapy prior to considering any surgical options to his liver metastases.  He underwent Y-90 delivery on 12/30/22. Tolerated well.   CT CAP on 1/23/23 reviewed at [facility] conference with good response to Y-90, no new lesions.  Underwent second Y-90 on 1/27/23. Can consider R hepatectomy pending follow-up imaging after Y-90.     Received cycle 42 of FOLFOX (omitted oxaliplatin again starting cycle 41 due to neuropathy) on 2/9/23.  Because of 5-FU shortage nationally, we have been holding chemotherapy since mid-February 2023.  If he needs to restart chemotherapy (I.e. no plans for surgical resection), will place him on capecitabine maintenance for duration of 5-FU shortage.     Discussed at colorectal liver mets tumor board 3/16/23.  Plan for repeat Y-90 and then consideration of surgical resection and he will meet with liver transplant team as well.  Underwent Y-90 delivery 5/17/23 with IR.     Has been on maintenance Xeloda since late April 2023.    Met with liver transplant team but ultimately opted not to proceed with transplant as he was concerned about how he would tolerate a major surgery with the life changes that would come after transplant as well. They closed out his case.    He met with Dr. Rodriguez to discuss whether surgical resection is indicated for his liver mets.  He recommended repeat MRI.  Repeat MRI unfortunately shows some disease progression while on Xeloda maintenance.  Similarly his CEA has risen precipitously, all in keeping with worsening disease.  We have discussed with Dr. Rodriguez but we recommend we restart IV  chemotherapy with FOLFIRI + Avastin (never had irinotecan).  He will not restart next cycle of Xeloda given change in plan.  Will obtain PGX and obtain insurance auth.    - doing well today. Eating well.   - Lab work reviewed. Bili 2.4. Will reduce dose of 5-FU and hold the Irinotecan. Once his bilirubin improves, will consider increasing the dose and adding Irinotecan. Other lab work unremarkable. Adequate to proceed today.   - Patient was consented for chemotherapy today 7/11/2023 .   An extensive discussion was had which included a thorough discussion of the risk and benefits of treatment and alternatives.  Risks, including but not limited to, possible hair loss, bone marrow damage (anemia, thrombocytopenia, immune suppression, neutropenia), damage to body organs (brain, heart, liver, kidney, lungs, nervous system, skin, and others), allergic reactions, sterility, nausea/vomiting, constipation/diarrhea, sores in the mouth, secondary cancers, local damage at possible injection sites, and rarely death were all discussed.  The patient agrees with the plan, and all questions have been answered to their satisfaction.  Consent was signed the patient, provider, and a third party witness.      - Proceed with cycle 1 of FOLFIRI plus avastin with pump d/c on day 3. Will hold the irinotecan due to his bilirubin and start with 5-FU plus avastin  - Will order an RUQ US as well to evaluate for possible biliary obstruction. Pt discussed with Dr. Sanders in 2 weeks for next cycle. Will repeat imaging studies after 4 cycles.    4. Hypertension   -- BP normal today. Feels well.   -- taking Amlodipine .   -- Following with PCP.   -- encouraged him and his daughter to monitor BP and HR closely at home.     5. MARIA A  -- Resolved. Cr normal.   -- Monitor. Encouraged continued hydration.     6. Anemia  -- Hgb stable.  Monitor.  -- No signs of bleeding. Platelets normal.     7-10. Neoplasm related pain, weight loss, constipation  --  Pain very well controlled.  Has oxycodone 5mg to use PRN.  -- Following with nutrition. Encouraged increased protein. Had been drinking Boost daily.  Weight improving.  -- Continue Miralax daily for constipation.    11. Urinary hesitancy  -- Continue Flomax.  -- Reports improvement since starting medication.     Patient is in agreement with the proposed treatment plan. All questions were answered to the patient's satisfaction. Pt knows to call clinic if anything is needed before the next clinic visit.    Javier Downs was consented for chemotherapy/immunotherapy to treat Colon cancer. Javier Downs was consented to receive FOLFIRI plus avastin.   The consent was discussed and reviewed with patient and daughter.     2. Patient was education on what to expect when receiving chemotherapy including: checking in, receiving an ID band, 1 guest allowed in the infusion suite during infusion, can alternate people as well, pole going with you once you are hooked up, warm blankets are available, you may bring lunch and snacks, minimal snacks are available, take medications as regularly unless told otherwise, warm blankets, will be available. Education about what to expect during their chemo cycle and how often their regimen is given.     3. An extensive discussion was had which included a thorough discussion of the risk and benefits of treatment and alternatives.  Risks, including but not limited to, possible hair loss, bone marrow damage (anemia, thrombocytopenia, immune suppression, neutropenia), damage to body organs (brain, heart, liver, kidney, lungs, nervous system, skin, and others), allergic reactions, sterility, nausea/vomiting, constipation/diarrhea, sores in the mouth, secondary cancers, local damage at possible injection sites, and rarely death were all discussed. Specific side effects pertaining to their chemotherapy/immunotherapy medications were discussed as well.The patient agrees with the plan, and all  questions and their support system's questions have been answered to their satisfaction. Contraindications and potential side effects discussed as listed in micromedx.     4. Patient was given binder which includes: contact information for the [facility], an immunotherapy side effect guide (if applicable to patient), resources including, but not limited to: women's wellness, acupuncture, physical therapy, , urgent care within oncology and financial assistance.     5. Patient was educated on when to call (and given the numbers to call and knows to message via [facility] if possible) or notify the provider including, but not limited to:   Persistent Nausea and/or Vomiting  Dehydration  Persistent Diarrhea  Fever of 100.4 > 1 hour in duration or any isolated fever > 101   Rash (while on active chemotherapy or immunotherapy)   Severe pain or new onset pain not controlled by current medication regimen  Or any other symptom you feel is related to your current hematology or oncology treatment    6. Patient was provided with additional resources that [facility] offers including, but not limited to: financial counseling, , psychologist, palliative care, support groups, transportation, dietician, rehab services, women's wellness and urgent care visits within the oncology department.       FAN Meier, PA-C  Physician Assistant Certified  Dept of Hematology/Oncology  PA-C to Dr. Mueller, Dr. Schuster and Dr. Castellon     Route Chart for Scheduling    Med Onc Chart Routing      Follow up with physician 2 weeks and 6 weeks. Lab work and FOLFIRI with pump d/c   Follow up with RACHEL 4 weeks. Lab work and FOLFIRI with pump d/c   Infusion scheduling note    Injection scheduling note    Labs CBC, CMP, CEA and urinalysis   Scheduling:  Preferred lab:  Lab interval: every 2 weeks     Imaging    Pharmacy appointment    Other referrals

## 2023-07-13 ENCOUNTER — INFUSION (OUTPATIENT)
Dept: INFUSION THERAPY | Facility: HOSPITAL | Age: 73
End: 2023-07-13
Payer: MEDICARE

## 2023-07-13 DIAGNOSIS — C78.7 METASTATIC COLON CANCER TO LIVER: Primary | ICD-10-CM

## 2023-07-13 DIAGNOSIS — C18.9 METASTATIC COLON CANCER TO LIVER: Primary | ICD-10-CM

## 2023-07-13 PROCEDURE — A4216 STERILE WATER/SALINE, 10 ML: HCPCS | Performed by: INTERNAL MEDICINE

## 2023-07-13 PROCEDURE — 25000003 PHARM REV CODE 250: Performed by: INTERNAL MEDICINE

## 2023-07-13 PROCEDURE — 63600175 PHARM REV CODE 636 W HCPCS: Performed by: INTERNAL MEDICINE

## 2023-07-13 RX ORDER — HEPARIN 100 UNIT/ML
500 SYRINGE INTRAVENOUS
Status: DISCONTINUED | OUTPATIENT
Start: 2023-07-13 | End: 2023-07-13 | Stop reason: HOSPADM

## 2023-07-13 RX ORDER — SODIUM CHLORIDE 0.9 % (FLUSH) 0.9 %
10 SYRINGE (ML) INJECTION
Status: DISCONTINUED | OUTPATIENT
Start: 2023-07-13 | End: 2023-07-13 | Stop reason: HOSPADM

## 2023-07-13 RX ADMIN — HEPARIN 500 UNITS: 100 SYRINGE at 10:07

## 2023-07-13 RX ADMIN — Medication 10 ML: at 10:07

## 2023-07-13 NOTE — PLAN OF CARE
1009- Pt ambulatory to clinic today for pump d/c. Denies any complaints. Pump completed prior to arrival. Port deaccessed after flushing. Ambulatory from clinic in [facility].

## 2023-07-19 ENCOUNTER — HOSPITAL ENCOUNTER (OUTPATIENT)
Dept: RADIOLOGY | Facility: OTHER | Age: 73
Discharge: HOME | End: 2023-07-19
Attending: PHYSICIAN ASSISTANT
Payer: MEDICARE

## 2023-07-19 DIAGNOSIS — C78.7 METASTATIC COLON CANCER TO LIVER: ICD-10-CM

## 2023-07-19 DIAGNOSIS — C18.9 METASTATIC COLON CANCER TO LIVER: ICD-10-CM

## 2023-07-19 DIAGNOSIS — E80.6 HYPERBILIRUBINEMIA: ICD-10-CM

## 2023-07-19 PROCEDURE — 76705 ECHO EXAM OF ABDOMEN: CPT | Mod: TC

## 2023-07-19 PROCEDURE — 76705 US ABDOMEN LIMITED: ICD-10-PCS | Mod: 26,,, | Performed by: RADIOLOGY

## 2023-07-19 PROCEDURE — 76705 ECHO EXAM OF ABDOMEN: CPT | Mod: 26,,, | Performed by: RADIOLOGY

## 2023-07-24 ENCOUNTER — LAB VISIT (OUTPATIENT)
Dept: LAB | Facility: HOSPITAL | Age: 73
End: 2023-07-24
Attending: INTERNAL MEDICINE
Payer: MEDICARE

## 2023-07-24 DIAGNOSIS — C78.7 METASTATIC COLON CANCER TO LIVER: ICD-10-CM

## 2023-07-24 DIAGNOSIS — C18.9 METASTATIC COLON CANCER TO LIVER: ICD-10-CM

## 2023-07-24 LAB
ALBUMIN SERPL BCP-MCNC: 2.9 G/DL (ref 3.5–5.2)
ALP SERPL-CCNC: 183 U/L (ref 55–135)
ALT SERPL W/O P-5'-P-CCNC: 15 U/L (ref 10–44)
ANION GAP SERPL CALC-SCNC: 10 MMOL/L (ref 8–16)
AST SERPL-CCNC: 32 U/L (ref 10–40)
BASOPHILS # BLD AUTO: 0.06 K/UL (ref 0–0.2)
BASOPHILS NFR BLD: 1 % (ref 0–1.9)
BILIRUB SERPL-MCNC: 1.5 MG/DL (ref 0.1–1)
BUN SERPL-MCNC: 10 MG/DL (ref 8–23)
CALCIUM SERPL-MCNC: 9.2 MG/DL (ref 8.7–10.5)
CEA SERPL-MCNC: 125.8 NG/ML (ref 0–5)
CHLORIDE SERPL-SCNC: 106 MMOL/L (ref 95–110)
CO2 SERPL-SCNC: 22 MMOL/L (ref 23–29)
CREAT SERPL-MCNC: 0.8 MG/DL (ref 0.5–1.4)
DIFFERENTIAL METHOD: ABNORMAL
EOSINOPHIL # BLD AUTO: 0.2 K/UL (ref 0–0.5)
EOSINOPHIL NFR BLD: 3.9 % (ref 0–8)
ERYTHROCYTE [DISTWIDTH] IN BLOOD BY AUTOMATED COUNT: 20.8 % (ref 11.5–14.5)
EST. GFR  (NO RACE VARIABLE): >60 ML/MIN/1.73 M^2
GLUCOSE SERPL-MCNC: 89 MG/DL (ref 70–110)
HCT VFR BLD AUTO: 36.1 % (ref 40–54)
HGB BLD-MCNC: 10.6 G/DL (ref 14–18)
IMM GRANULOCYTES # BLD AUTO: 0.03 K/UL (ref 0–0.04)
IMM GRANULOCYTES NFR BLD AUTO: 0.5 % (ref 0–0.5)
LYMPHOCYTES # BLD AUTO: 1.2 K/UL (ref 1–4.8)
LYMPHOCYTES NFR BLD: 19.1 % (ref 18–48)
MCH RBC QN AUTO: 25.1 PG (ref 27–31)
MCHC RBC AUTO-ENTMCNC: 29.4 G/DL (ref 32–36)
MCV RBC AUTO: 85 FL (ref 82–98)
MONOCYTES # BLD AUTO: 0.9 K/UL (ref 0.3–1)
MONOCYTES NFR BLD: 14.1 % (ref 4–15)
NEUTROPHILS # BLD AUTO: 3.8 K/UL (ref 1.8–7.7)
NEUTROPHILS NFR BLD: 61.4 % (ref 38–73)
NRBC BLD-RTO: 0 /100 WBC
PLATELET # BLD AUTO: 288 K/UL (ref 150–450)
PMV BLD AUTO: 9.7 FL (ref 9.2–12.9)
POTASSIUM SERPL-SCNC: 4.1 MMOL/L (ref 3.5–5.1)
PROT SERPL-MCNC: 7 G/DL (ref 6–8.4)
RBC # BLD AUTO: 4.23 M/UL (ref 4.6–6.2)
SODIUM SERPL-SCNC: 138 MMOL/L (ref 136–145)
WBC # BLD AUTO: 6.22 K/UL (ref 3.9–12.7)

## 2023-07-24 PROCEDURE — 82378 CARCINOEMBRYONIC ANTIGEN: CPT | Performed by: INTERNAL MEDICINE

## 2023-07-24 PROCEDURE — 80053 COMPREHEN METABOLIC PANEL: CPT | Performed by: INTERNAL MEDICINE

## 2023-07-24 PROCEDURE — 85025 COMPLETE CBC W/AUTO DIFF WBC: CPT | Performed by: INTERNAL MEDICINE

## 2023-07-24 PROCEDURE — 36415 COLL VENOUS BLD VENIPUNCTURE: CPT | Performed by: INTERNAL MEDICINE

## 2023-07-26 ENCOUNTER — OFFICE VISIT (OUTPATIENT)
Dept: HEMATOLOGY/ONCOLOGY | Facility: CLINIC | Age: 73
End: 2023-07-26
Payer: MEDICARE

## 2023-07-26 ENCOUNTER — INFUSION (OUTPATIENT)
Dept: INFUSION THERAPY | Facility: HOSPITAL | Age: 73
End: 2023-07-26
Payer: MEDICARE

## 2023-07-26 VITALS
HEART RATE: 81 BPM | OXYGEN SATURATION: 100 % | DIASTOLIC BLOOD PRESSURE: 66 MMHG | SYSTOLIC BLOOD PRESSURE: 111 MMHG | WEIGHT: 118.81 LBS | BODY MASS INDEX: 18.65 KG/M2 | HEIGHT: 67 IN | TEMPERATURE: 98 F | RESPIRATION RATE: 20 BRPM

## 2023-07-26 VITALS
OXYGEN SATURATION: 99 % | HEIGHT: 67 IN | BODY MASS INDEX: 18.65 KG/M2 | WEIGHT: 118.81 LBS | TEMPERATURE: 98 F | SYSTOLIC BLOOD PRESSURE: 116 MMHG | RESPIRATION RATE: 18 BRPM | DIASTOLIC BLOOD PRESSURE: 66 MMHG | HEART RATE: 79 BPM

## 2023-07-26 DIAGNOSIS — D84.821 IMMUNODEFICIENCY DUE TO CHEMOTHERAPY: ICD-10-CM

## 2023-07-26 DIAGNOSIS — C78.7 METASTATIC COLON CANCER TO LIVER: Primary | ICD-10-CM

## 2023-07-26 DIAGNOSIS — R63.4 WEIGHT DECREASE: ICD-10-CM

## 2023-07-26 DIAGNOSIS — K59.03 DRUG-INDUCED CONSTIPATION: ICD-10-CM

## 2023-07-26 DIAGNOSIS — C18.9 METASTATIC COLON CANCER TO LIVER: Primary | ICD-10-CM

## 2023-07-26 DIAGNOSIS — T45.1X5A ANEMIA ASSOCIATED WITH CHEMOTHERAPY: ICD-10-CM

## 2023-07-26 DIAGNOSIS — Z79.899 IMMUNODEFICIENCY DUE TO CHEMOTHERAPY: ICD-10-CM

## 2023-07-26 DIAGNOSIS — R52 PAIN: ICD-10-CM

## 2023-07-26 DIAGNOSIS — T45.1X5A IMMUNODEFICIENCY DUE TO CHEMOTHERAPY: ICD-10-CM

## 2023-07-26 DIAGNOSIS — E43 SEVERE MALNUTRITION: ICD-10-CM

## 2023-07-26 DIAGNOSIS — D64.81 ANEMIA ASSOCIATED WITH CHEMOTHERAPY: ICD-10-CM

## 2023-07-26 DIAGNOSIS — N17.9 AKI (ACUTE KIDNEY INJURY): ICD-10-CM

## 2023-07-26 DIAGNOSIS — D84.81 IMMUNODEFICIENCY SECONDARY TO NEOPLASM: ICD-10-CM

## 2023-07-26 DIAGNOSIS — D49.9 IMMUNODEFICIENCY SECONDARY TO NEOPLASM: ICD-10-CM

## 2023-07-26 DIAGNOSIS — I10 PRIMARY HYPERTENSION: ICD-10-CM

## 2023-07-26 DIAGNOSIS — R39.9 LOWER URINARY TRACT SYMPTOMS (LUTS): ICD-10-CM

## 2023-07-26 PROCEDURE — 1159F PR MEDICATION LIST DOCUMENTED IN MEDICAL RECORD: ICD-10-PCS | Mod: CPTII,S$GLB,, | Performed by: INTERNAL MEDICINE

## 2023-07-26 PROCEDURE — 96413 CHEMO IV INFUSION 1 HR: CPT

## 2023-07-26 PROCEDURE — 3074F PR MOST RECENT SYSTOLIC BLOOD PRESSURE < 130 MM HG: ICD-10-PCS | Mod: CPTII,S$GLB,, | Performed by: INTERNAL MEDICINE

## 2023-07-26 PROCEDURE — 3074F SYST BP LT 130 MM HG: CPT | Mod: CPTII,S$GLB,, | Performed by: INTERNAL MEDICINE

## 2023-07-26 PROCEDURE — 3288F PR FALLS RISK ASSESSMENT DOCUMENTED: ICD-10-PCS | Mod: CPTII,S$GLB,, | Performed by: INTERNAL MEDICINE

## 2023-07-26 PROCEDURE — 1159F MED LIST DOCD IN RCRD: CPT | Mod: CPTII,S$GLB,, | Performed by: INTERNAL MEDICINE

## 2023-07-26 PROCEDURE — 96417 CHEMO IV INFUS EACH ADDL SEQ: CPT

## 2023-07-26 PROCEDURE — 3078F PR MOST RECENT DIASTOLIC BLOOD PRESSURE < 80 MM HG: ICD-10-PCS | Mod: CPTII,S$GLB,, | Performed by: INTERNAL MEDICINE

## 2023-07-26 PROCEDURE — 96367 TX/PROPH/DG ADDL SEQ IV INF: CPT

## 2023-07-26 PROCEDURE — 99215 OFFICE O/P EST HI 40 MIN: CPT | Mod: S$GLB,,, | Performed by: INTERNAL MEDICINE

## 2023-07-26 PROCEDURE — 96415 CHEMO IV INFUSION ADDL HR: CPT

## 2023-07-26 PROCEDURE — 3008F PR BODY MASS INDEX (BMI) DOCUMENTED: ICD-10-PCS | Mod: CPTII,S$GLB,, | Performed by: INTERNAL MEDICINE

## 2023-07-26 PROCEDURE — 25000003 PHARM REV CODE 250: Performed by: INTERNAL MEDICINE

## 2023-07-26 PROCEDURE — 99999 PR PBB SHADOW E&M-EST. PATIENT-LVL III: CPT | Mod: PBBFAC,,, | Performed by: INTERNAL MEDICINE

## 2023-07-26 PROCEDURE — 96375 TX/PRO/DX INJ NEW DRUG ADDON: CPT

## 2023-07-26 PROCEDURE — 3078F DIAST BP <80 MM HG: CPT | Mod: CPTII,S$GLB,, | Performed by: INTERNAL MEDICINE

## 2023-07-26 PROCEDURE — 3008F BODY MASS INDEX DOCD: CPT | Mod: CPTII,S$GLB,, | Performed by: INTERNAL MEDICINE

## 2023-07-26 PROCEDURE — 63600175 PHARM REV CODE 636 W HCPCS: Performed by: INTERNAL MEDICINE

## 2023-07-26 PROCEDURE — 1101F PT FALLS ASSESS-DOCD LE1/YR: CPT | Mod: CPTII,S$GLB,, | Performed by: INTERNAL MEDICINE

## 2023-07-26 PROCEDURE — 96416 CHEMO PROLONG INFUSE W/PUMP: CPT

## 2023-07-26 PROCEDURE — 99999 PR PBB SHADOW E&M-EST. PATIENT-LVL III: ICD-10-PCS | Mod: PBBFAC,,, | Performed by: INTERNAL MEDICINE

## 2023-07-26 PROCEDURE — 1126F PR PAIN SEVERITY QUANTIFIED, NO PAIN PRESENT: ICD-10-PCS | Mod: CPTII,S$GLB,, | Performed by: INTERNAL MEDICINE

## 2023-07-26 PROCEDURE — 1126F AMNT PAIN NOTED NONE PRSNT: CPT | Mod: CPTII,S$GLB,, | Performed by: INTERNAL MEDICINE

## 2023-07-26 PROCEDURE — 1101F PR PT FALLS ASSESS DOC 0-1 FALLS W/OUT INJ PAST YR: ICD-10-PCS | Mod: CPTII,S$GLB,, | Performed by: INTERNAL MEDICINE

## 2023-07-26 PROCEDURE — 99215 PR OFFICE/OUTPT VISIT, EST, LEVL V, 40-54 MIN: ICD-10-PCS | Mod: S$GLB,,, | Performed by: INTERNAL MEDICINE

## 2023-07-26 PROCEDURE — 3288F FALL RISK ASSESSMENT DOCD: CPT | Mod: CPTII,S$GLB,, | Performed by: INTERNAL MEDICINE

## 2023-07-26 RX ORDER — DIPHENHYDRAMINE HYDROCHLORIDE 50 MG/ML
50 INJECTION INTRAMUSCULAR; INTRAVENOUS ONCE AS NEEDED
Status: DISCONTINUED | OUTPATIENT
Start: 2023-07-26 | End: 2023-07-26 | Stop reason: HOSPADM

## 2023-07-26 RX ORDER — HEPARIN 100 UNIT/ML
500 SYRINGE INTRAVENOUS
Status: CANCELLED | OUTPATIENT
Start: 2023-07-27

## 2023-07-26 RX ORDER — HEPARIN 100 UNIT/ML
500 SYRINGE INTRAVENOUS
Status: DISCONTINUED | OUTPATIENT
Start: 2023-07-26 | End: 2023-07-26 | Stop reason: HOSPADM

## 2023-07-26 RX ORDER — PROCHLORPERAZINE EDISYLATE 5 MG/ML
5 INJECTION INTRAMUSCULAR; INTRAVENOUS ONCE AS NEEDED
Status: DISCONTINUED | OUTPATIENT
Start: 2023-07-26 | End: 2023-07-26 | Stop reason: HOSPADM

## 2023-07-26 RX ORDER — ATROPINE SULFATE 0.4 MG/ML
0.4 INJECTION, SOLUTION ENDOTRACHEAL; INTRAMEDULLARY; INTRAMUSCULAR; INTRAVENOUS; SUBCUTANEOUS ONCE AS NEEDED
Status: COMPLETED | OUTPATIENT
Start: 2023-07-26 | End: 2023-07-26

## 2023-07-26 RX ORDER — EPINEPHRINE 0.3 MG/.3ML
0.3 INJECTION SUBCUTANEOUS ONCE AS NEEDED
Status: DISCONTINUED | OUTPATIENT
Start: 2023-07-26 | End: 2023-07-26 | Stop reason: HOSPADM

## 2023-07-26 RX ORDER — SODIUM CHLORIDE 0.9 % (FLUSH) 0.9 %
10 SYRINGE (ML) INJECTION
OUTPATIENT
Start: 2023-07-26

## 2023-07-26 RX ORDER — SODIUM CHLORIDE 0.9 % (FLUSH) 0.9 %
10 SYRINGE (ML) INJECTION
Status: CANCELLED | OUTPATIENT
Start: 2023-07-27

## 2023-07-26 RX ORDER — HEPARIN 100 UNIT/ML
500 SYRINGE INTRAVENOUS
OUTPATIENT
Start: 2023-07-26

## 2023-07-26 RX ORDER — SODIUM CHLORIDE 0.9 % (FLUSH) 0.9 %
10 SYRINGE (ML) INJECTION
Status: DISCONTINUED | OUTPATIENT
Start: 2023-07-26 | End: 2023-07-26 | Stop reason: HOSPADM

## 2023-07-26 RX ADMIN — FLUOROURACIL 3840 MG: 50 INJECTION, SOLUTION INTRAVENOUS at 03:07

## 2023-07-26 RX ADMIN — SODIUM CHLORIDE: 9 INJECTION, SOLUTION INTRAVENOUS at 10:07

## 2023-07-26 RX ADMIN — BEVACIZUMAB-AWWB 270 MG: 100 INJECTION, SOLUTION INTRAVENOUS at 11:07

## 2023-07-26 RX ADMIN — DEXAMETHASONE SODIUM PHOSPHATE 0.25 MG: 4 INJECTION, SOLUTION INTRA-ARTICULAR; INTRALESIONAL; INTRAMUSCULAR; INTRAVENOUS; SOFT TISSUE at 01:07

## 2023-07-26 RX ADMIN — ATROPINE SULFATE 0.4 MG: 0.4 INJECTION, SOLUTION INTRAVENOUS at 03:07

## 2023-07-26 RX ADMIN — IRINOTECAN HYDROCHLORIDE 280 MG: 20 INJECTION, SOLUTION INTRAVENOUS at 01:07

## 2023-07-26 NOTE — PROGRESS NOTES
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 Bonilla  12/30/2022: Y-90 to R liver  1/27/2023: Y-90 to R liver  5/17/2023: Y-90 to R liver  7/11/2023: C1 FOLFIRI + Pema    Interval History:   He presents today with his daughter prior to cycle 2 of FOLFIRI plus avastin. He is feeling well.  He is eating well, no significant adverse effects to cycle one which was administered without irinotecan because of hyperbilirubinemia.  He had some mild swelling in his feet that has improved.  No pain. No other concerns or complaints today.     ECOG status is 1.       Past Medical History:   Past Medical History:   Diagnosis Date    MARIA A (acute kidney injury) 8/18/2022    Hypertension     Metastatic colon cancer to liver 4/22/2021       Past Surgical HIstory:   Past Surgical History:   Procedure Laterality Date    COLONOSCOPY N/A 4/20/2021    Procedure: COLONOSCOPY with possible stent;  Surgeon: EDILMA Bonilla MD;  Location: [facility] (2ND FLR);  Service: Colon and Rectal;  Laterality: N/A;    DIAGNOSTIC LAPAROSCOPY N/A 9/7/2022    Procedure: LAPAROSCOPY, DIAGNOSTIC;  Surgeon: Adrian Rodriguez MD;  Location: [facility] OR 2ND FLR;  Service: General;  Laterality: N/A;    INSERTION OF TUNNELED CENTRAL VENOUS CATHETER (CVC) WITH SUBCUTANEOUS PORT N/A 5/3/2021    Procedure:  [id number];  Surgeon: Francisco Layton MD;  Location: [facility] OR 2ND FLR;  Service: Vascular;  Laterality: N/A;    OMENTECTOMY N/A 10/20/2022    Procedure: OMENTECTOMY;  Surgeon: EDILMA Bonilla MD;  Location: [facility] OR 2ND FLR;  Service: Colon and Rectal;  Laterality: N/A;    RIGHT HEMICOLECTOMY N/A 10/20/2022    Procedure: HEMICOLECTOMY, RIGHT, extended;  Surgeon: EDILMA Bonilla MD;  Location: [facility] OR 2ND FLR;  Service: Colon and Rectal;  Laterality: N/A;  Extended right hemicolectomy CONSENT IN AM       Family History: History reviewed. No pertinent family history.    Social History:  reports that he has never smoked. He has never used smokeless tobacco. He reports that he does not currently use alcohol. He reports that he does not use drugs.    Allergies:  Review of patient's allergies indicates:  No Known Allergies    Medications:  Current Outpatient Medications   Medication Sig Dispense Refill    acetaminophen (TYLENOL) 500 MG tablet Take 1 tablet (500 mg total) by mouth every 6 (six) hours as needed for Pain (alternate with ibuprofen). (Patient not taking: Reported on 6/28/2023)  0    amLODIPine (NORVASC) 10 MG tablet Take 1 tablet (10 mg total) by mouth once daily. 90 tablet 3    capecitabine (XELODA) 500 MG Tab Take 3 tablets (1,500 mg) by mouth twice daily with food on days 1 thru 7 of 14 day cycles. (Patient not taking: Reported on 6/28/2023.) 84 tablet 5    ibuprofen (ADVIL,MOTRIN) 400 MG tablet Take 1 tablet (400 mg total) by mouth every 6 (six) hours as needed for Other (pain). Alternate with tylenol (Patient not taking: Reported on 6/28/2023)      ondansetron (ZOFRAN) 4 MG tablet Take 1 tablet (4 mg total) by mouth every 8 (eight) hours as needed for Nausea. (Patient not taking: Reported on 6/28/2023) 30 tablet 3    oxyCODONE (ROXICODONE) 5 MG immediate release tablet Take 1 tablet (5 mg total) by mouth every 6 (six) hours as needed for Pain. (Patient not taking: Reported on 6/28/2023) 20  "tablet 0    tamsulosin (FLOMAX) 0.4 mg Cap Take 1 capsule (0.4 mg total) by mouth once daily. (Patient not taking: Reported on 6/28/2023) 30 capsule 5     No current facility-administered medications for this visit.       Review of Systems   Constitutional:  Positive for fatigue. Negative for activity change, appetite change, chills, diaphoresis, fever and unexpected weight change.   HENT:  Negative for congestion, mouth sores, nosebleeds, postnasal drip, rhinorrhea, trouble swallowing and voice change.    Eyes:  Negative for pain and visual disturbance.   Respiratory:  Negative for cough, chest tightness, shortness of breath and wheezing.    Cardiovascular:  Negative for chest pain, palpitations and leg swelling.   Gastrointestinal:  Negative for abdominal distention, abdominal pain, blood in stool, constipation, diarrhea, nausea and vomiting.   Genitourinary:  Negative for difficulty urinating, dysuria, flank pain, frequency, hematuria and urgency.   Musculoskeletal:  Negative for arthralgias, back pain and myalgias.   Skin:  Negative for rash and wound.   Neurological:  Positive for numbness. Negative for dizziness, facial asymmetry, weakness, light-headedness and headaches.   Psychiatric/Behavioral:  Negative for agitation, behavioral problems, confusion, decreased concentration, dysphoric mood and sleep disturbance. The patient is not nervous/anxious.    All other systems reviewed and are negative.    ECOG Performance Status: 1     Objective:      Vitals:   Vitals:    07/26/23 0900   BP: 116/66   BP Location: Right arm   Patient Position: Sitting   BP Method: Large (Automatic)   Pulse: 79   Resp: 18   Temp: 97.8 °F (36.6 °C)   TempSrc: Oral   SpO2: 99%   Weight: 53.9 kg (118 lb 13.3 oz)   Height: 5' 7" (1.702 m)               Physical Exam  Vitals reviewed.   Constitutional:       General: He is not in acute distress.     Appearance: Normal appearance. He is not ill-appearing, toxic-appearing or diaphoretic. "   HENT:      Head: Normocephalic and atraumatic.      Right Ear: External ear normal.      Left Ear: External ear normal.   Eyes:      General: No scleral icterus.     Extraocular Movements: Extraocular movements intact.      Conjunctiva/sclera: Conjunctivae normal.      Pupils: Pupils are equal, round, and reactive to light.   Cardiovascular:      Rate and Rhythm: Normal rate and regular rhythm.      Pulses: Normal pulses.      Heart sounds: Normal heart sounds. No murmur heard.  Pulmonary:      Effort: Pulmonary effort is normal. No respiratory distress.      Breath sounds: Normal breath sounds. No wheezing.   Chest:      Comments: Port to RCW, no signs of infection.  Abdominal:      General: Abdomen is flat. Bowel sounds are normal. There is no distension.      Palpations: Abdomen is soft. There is no mass.      Tenderness: There is no abdominal tenderness.      Comments: Vertical midline abdominal wound well healed   Musculoskeletal:         General: No swelling or deformity. Normal range of motion.      Right lower leg: No edema.      Left lower leg: No edema.   Skin:     Coloration: Skin is not jaundiced or pale.      Findings: No bruising, erythema or rash.   Neurological:      General: No focal deficit present.      Mental Status: He is alert and oriented to person, place, and time. Mental status is at baseline.      Cranial Nerves: No cranial nerve deficit.      Sensory: No sensory deficit.      Gait: Gait normal.   Psychiatric:         Mood and Affect: Mood normal.         Behavior: Behavior normal.         Thought Content: Thought content normal.         Judgment: Judgment normal.     Laboratory Data:  Lab Visit on 07/24/2023   Component Date Value Ref Range Status    WBC 07/24/2023 6.22  3.90 - 12.70 K/uL Final    RBC 07/24/2023 4.23 (L)  4.60 - 6.20 M/uL Final    Hemoglobin 07/24/2023 10.6 (L)  14.0 - 18.0 g/dL Final    Hematocrit 07/24/2023 36.1 (L)  40.0 - 54.0 % Final    MCV 07/24/2023 85  82 - 98 fL  Final    MCH 07/24/2023 25.1 (L)  27.0 - 31.0 pg Final    MCHC 07/24/2023 29.4 (L)  32.0 - 36.0 g/dL Final    RDW 07/24/2023 20.8 (H)  11.5 - 14.5 % Final    Platelets 07/24/2023 288  150 - 450 K/uL Final    MPV 07/24/2023 9.7  9.2 - 12.9 fL Final    Immature Granulocytes 07/24/2023 0.5  0.0 - 0.5 % Final    Gran # (ANC) 07/24/2023 3.8  1.8 - 7.7 K/uL Final    Immature Grans (Abs) 07/24/2023 0.03  0.00 - 0.04 K/uL Final    Comment: Mild elevation in immature granulocytes is non specific and   can be seen in a variety of conditions including stress response,   acute inflammation, trauma and pregnancy. Correlation with other   laboratory and clinical findings is essential.      Lymph # 07/24/2023 1.2  1.0 - 4.8 K/uL Final    Mono # 07/24/2023 0.9  0.3 - 1.0 K/uL Final    Eos # 07/24/2023 0.2  0.0 - 0.5 K/uL Final    Baso # 07/24/2023 0.06  0.00 - 0.20 K/uL Final    nRBC 07/24/2023 0  0 /100 WBC Final    Gran % 07/24/2023 61.4  38.0 - 73.0 % Final    Lymph % 07/24/2023 19.1  18.0 - 48.0 % Final    Mono % 07/24/2023 14.1  4.0 - 15.0 % Final    Eosinophil % 07/24/2023 3.9  0.0 - 8.0 % Final    Basophil % 07/24/2023 1.0  0.0 - 1.9 % Final    Differential Method 07/24/2023 Automated   Final    Sodium 07/24/2023 138  136 - 145 mmol/L Final    Potassium 07/24/2023 4.1  3.5 - 5.1 mmol/L Final    Chloride 07/24/2023 106  95 - 110 mmol/L Final    CO2 07/24/2023 22 (L)  23 - 29 mmol/L Final    Glucose 07/24/2023 89  70 - 110 mg/dL Final    BUN 07/24/2023 10  8 - 23 mg/dL Final    Creatinine 07/24/2023 0.8  0.5 - 1.4 mg/dL Final    Calcium 07/24/2023 9.2  8.7 - 10.5 mg/dL Final    Total Protein 07/24/2023 7.0  6.0 - 8.4 g/dL Final    Albumin 07/24/2023 2.9 (L)  3.5 - 5.2 g/dL Final    Total Bilirubin 07/24/2023 1.5 (H)  0.1 - 1.0 mg/dL Final    Comment: For infants and newborns, interpretation of results should be based  on gestational age, weight and in agreement with clinical  observations.    Premature Infant recommended  reference ranges:  Up to 24 hours.............<8.0 mg/dL  Up to 48 hours............<12.0 mg/dL  3-5 days..................<15.0 mg/dL  6-29 days.................<15.0 mg/dL      Alkaline Phosphatase 07/24/2023 183 (H)  55 - 135 U/L Final    AST 07/24/2023 32  10 - 40 U/L Final    ALT 07/24/2023 15  10 - 44 U/L Final    eGFR 07/24/2023 >60.0  >60 mL/min/1.73 m^2 Final    Anion Gap 07/24/2023 10  8 - 16 mmol/L Final    CEA 07/24/2023 125.8 (H)  0.0 - 5.0 ng/mL Final    Comment: CEA Normal Range:  Non-Smokers: 0-3.0 ng/mL  Smokers:     0-5.0 ng/mL    The testing method is a chemiluminescent microparticle immunoassay   manufactured by [employer] and performed on the    or   [employer] system. Values obtained with different assay manufacturers   for   methods may be different and cannot be used interchangeably.     Lab Visit on 07/24/2023   Component Date Value Ref Range Status    Specimen UA 07/24/2023 Urine, Clean Catch   Final    Color, UA 07/24/2023 Yellow  Yellow, Straw, Marixa Final    Appearance, UA 07/24/2023 Clear  Clear Final    pH, UA 07/24/2023 7.0  5.0 - 8.0 Final    Specific Gravity, UA 07/24/2023 1.015  1.005 - 1.030 Final    Protein, UA 07/24/2023 Negative  Negative Final    Comment: Recommend a 24 hour urine protein or a urine   protein/creatinine ratio if globulin induced proteinuria is  clinically suspected.      Glucose, UA 07/24/2023 Negative  Negative Final    Ketones, UA 07/24/2023 Negative  Negative Final    Bilirubin (UA) 07/24/2023 Negative  Negative Final    Occult Blood UA 07/24/2023 Negative  Negative Final    Nitrite, UA 07/24/2023 Negative  Negative Final    Leukocytes, UA 07/24/2023 Negative  Negative Final    RBC, UA 07/24/2023 2  0 - 4 /hpf Final    WBC, UA 07/24/2023 3  0 - 5 /hpf Final    Microscopic Comment 07/24/2023 SEE COMMENT   Final    Comment: Other formed elements not mentioned in the report are not   present in the microscopic examination.        Assessment  and Plan        1. 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 Immunodeficiency secondary to neoplasm    4. Primary hypertension    5. MARIA A (acute kidney injury)    6. Anemia associated with chemotherapy    7. Pain    8. Drug-induced constipation    9. Weight decrease    10. Severe malnutrition    11. Lower urinary tract symptoms (LUTS)              1-3.  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  Guardant 360 was negative for BRAF, VIRI, HER2 amplification.   Pretreatment CEA 57.  We had a long and sonia discussion with him about his diagnosis. Unfortunately, the disease is not curable but remains treatable and he has good performance status.   Restaging scans after 7 cycles of FOLFOX + Pema showed significant reduction in colonic mass and liver mass.   we switched to maintenance as of cycle 8 with 5FU + Pema  Restaging scans from March 2022 show stable disease.   MRI on 7/18/22 showing hepatic progression of disease in the right hepatic lobe noted on the exam. CT CAP on 8/26 shows some mild progression in both liver metastases.    Discussed case at colorectal tumor board 8/31/22 and had a diagnostic lap performed by Dr. Rodriguez on 9/7 to assess for any occult peritoneal disease. Findings from the diagnostic lap were negative. Fluid from around from around the primary mass was sent for cytology that was negative for malignancy.   Underwent primary tumor resection on 10/20/22 with Dr. Bonilla.    Meanwhile his liver mets had grown.  We discussed his case at [facility] conference with plans for short term Y-90 and then restarting chemotherapy prior to considering any surgical options to his liver metastases.  He underwent Y-90 delivery on 12/30/22. Tolerated well.   CT CAP on 1/23/23 reviewed at [facility] conference with good response to Y-90, no new lesions.  Underwent second Y-90 on 1/27/23. Can consider R hepatectomy pending follow-up imaging after Y-90.      Received cycle 42 of FOLFOX (omitted oxaliplatin again starting cycle 41 due to neuropathy) on 2/9/23.  Because of 5-FU shortage nationally, we have been holding chemotherapy since mid-February 2023.  If he needs to restart chemotherapy (I.e. no plans for surgical resection), will place him on capecitabine maintenance for duration of 5-FU shortage.     Discussed at colorectal liver mets tumor board 3/16/23.  Plan for repeat Y-90 and then consideration of surgical resection and he will meet with liver transplant team as well.  Underwent Y-90 delivery 5/17/23 with IR.     Has been on maintenance Xeloda since late April 2023.    Met with liver transplant team but ultimately opted not to proceed with transplant as he was concerned about how he would tolerate a major surgery with the life changes that would come after transplant as well. They closed out his case.    He met with Dr. Rodriguez to discuss whether surgical resection is indicated for his liver mets.  He recommended repeat MRI.  Repeat MRI unfortunately showed disease progression while on Xeloda maintenance.  Similarly his CEA garrett precipitously, all in keeping with worsening disease.    We recommended we restart IV chemotherapy with FOLFIRI + Avastin (never had irinotecan).    Because of hyperbilirubinemia he received cycle 1 with just 5-FU + Avastin on 7/11/23.  Tolerated this well.  Bilirubin has improved.  Labs adequate to proceed with cycle 2 today with FOLFIRI + Avastin.    -RTC in 2 weeks for next cycle. Will repeat imaging studies after 4 cycles.    Tempus: APC, CKS1B cng, ERCC3 cnl, PIK3CA; KENIA, TMB 12.1.    4. Hypertension   -- BP normal today. Feels well.   -- taking Amlodipine .   -- Following with PCP.   -- encouraged him and his daughter to monitor BP and HR closely at home.     5. MARIA A  -- Resolved. Cr normal.   -- Monitor. Encouraged continued hydration.     6. Anemia  -- Hgb stable.  Monitor.  -- No signs of bleeding. Platelets normal.      7-10. Neoplasm related pain, weight loss, constipation  -- Pain very well controlled.  Has oxycodone 5mg to use PRN.  -- Following with nutrition. Encouraged increased protein. Had been drinking Boost daily.  Weight stable.  -- Continue Miralax daily for constipation.    11. Urinary hesitancy  -- Continue Flomax.  -- Reports improvement since starting medication.     Patient is in agreement with the proposed treatment plan. All questions were answered to the patient's satisfaction. Pt knows to call clinic if anything is needed before the next clinic visit.    Bunny Schuster MD  Hematology/Oncology  [facility]      Route Chart for Scheduling    Med Onc Chart Routing      Follow up with physician 4 weeks. for FOLFIRI Avastin   Follow up with RACHEL 2 weeks. for FOLFIRI Avastin   Infusion scheduling note    Injection scheduling note    Labs CBC, CMP, CEA and urinalysis   Scheduling:  Preferred lab:  Lab interval:     Imaging    Pharmacy appointment    Other referrals

## 2023-07-26 NOTE — NURSING
PT tolerated MVASI and Irinotecan infusion well. No adverse reaction noted. Patient started on 5FU via take home pump. 5FU actively infusing and all connections are secure and clamps are open. Pt education reinforced on MVASI, Irinotecan, and 5FU side effects, what to expect and when to contact the doctor. Patient instructed to return to the infusion clinic on Friday, 7/28/23, at 1330 for pump d/c. Pt verbalized understanding.

## 2023-07-28 ENCOUNTER — INFUSION (OUTPATIENT)
Dept: INFUSION THERAPY | Facility: HOSPITAL | Age: 73
End: 2023-07-28
Payer: MEDICARE

## 2023-07-28 VITALS
SYSTOLIC BLOOD PRESSURE: 98 MMHG | DIASTOLIC BLOOD PRESSURE: 68 MMHG | TEMPERATURE: 98 F | HEART RATE: 85 BPM | RESPIRATION RATE: 18 BRPM

## 2023-07-28 DIAGNOSIS — C78.7 METASTATIC COLON CANCER TO LIVER: Primary | ICD-10-CM

## 2023-07-28 DIAGNOSIS — C18.9 METASTATIC COLON CANCER TO LIVER: Primary | ICD-10-CM

## 2023-07-28 PROCEDURE — 63600175 PHARM REV CODE 636 W HCPCS: Performed by: INTERNAL MEDICINE

## 2023-07-28 PROCEDURE — A4216 STERILE WATER/SALINE, 10 ML: HCPCS | Performed by: INTERNAL MEDICINE

## 2023-07-28 PROCEDURE — 25000003 PHARM REV CODE 250: Performed by: INTERNAL MEDICINE

## 2023-07-28 RX ORDER — HEPARIN 100 UNIT/ML
500 SYRINGE INTRAVENOUS
Status: DISCONTINUED | OUTPATIENT
Start: 2023-07-28 | End: 2023-07-28 | Stop reason: HOSPADM

## 2023-07-28 RX ORDER — SODIUM CHLORIDE 0.9 % (FLUSH) 0.9 %
10 SYRINGE (ML) INJECTION
Status: DISCONTINUED | OUTPATIENT
Start: 2023-07-28 | End: 2023-07-28 | Stop reason: HOSPADM

## 2023-07-28 RX ADMIN — Medication 10 ML: at 01:07

## 2023-07-28 RX ADMIN — HEPARIN 500 UNITS: 100 SYRINGE at 01:07

## 2023-07-28 NOTE — PLAN OF CARE
Pt disconnected from CADD pump. Infusion completed with no complications. VSS. Pt instructed to call MD with any problems. NAD. Pt discharged home independently.

## 2023-08-07 ENCOUNTER — LAB VISIT (OUTPATIENT)
Dept: LAB | Facility: HOSPITAL | Age: 73
End: 2023-08-07
Attending: INTERNAL MEDICINE
Payer: MEDICARE

## 2023-08-07 DIAGNOSIS — C18.9 METASTATIC COLON CANCER TO LIVER: ICD-10-CM

## 2023-08-07 DIAGNOSIS — C78.7 METASTATIC COLON CANCER TO LIVER: ICD-10-CM

## 2023-08-07 LAB
ALBUMIN SERPL BCP-MCNC: 3.1 G/DL (ref 3.5–5.2)
ALP SERPL-CCNC: 204 U/L (ref 55–135)
ALT SERPL W/O P-5'-P-CCNC: 17 U/L (ref 10–44)
ANION GAP SERPL CALC-SCNC: 11 MMOL/L (ref 8–16)
AST SERPL-CCNC: 30 U/L (ref 10–40)
BASOPHILS # BLD AUTO: 0.02 K/UL (ref 0–0.2)
BASOPHILS NFR BLD: 0.8 % (ref 0–1.9)
BILIRUB SERPL-MCNC: 0.9 MG/DL (ref 0.1–1)
BUN SERPL-MCNC: 11 MG/DL (ref 8–23)
CALCIUM SERPL-MCNC: 9.2 MG/DL (ref 8.7–10.5)
CEA SERPL-MCNC: 95.5 NG/ML (ref 0–5)
CHLORIDE SERPL-SCNC: 106 MMOL/L (ref 95–110)
CO2 SERPL-SCNC: 23 MMOL/L (ref 23–29)
CREAT SERPL-MCNC: 0.9 MG/DL (ref 0.5–1.4)
DIFFERENTIAL METHOD: ABNORMAL
EOSINOPHIL # BLD AUTO: 0.1 K/UL (ref 0–0.5)
EOSINOPHIL NFR BLD: 3.5 % (ref 0–8)
ERYTHROCYTE [DISTWIDTH] IN BLOOD BY AUTOMATED COUNT: 20.3 % (ref 11.5–14.5)
EST. GFR  (NO RACE VARIABLE): >60 ML/MIN/1.73 M^2
GLUCOSE SERPL-MCNC: 89 MG/DL (ref 70–110)
HCT VFR BLD AUTO: 31.9 % (ref 40–54)
HGB BLD-MCNC: 9.6 G/DL (ref 14–18)
IMM GRANULOCYTES # BLD AUTO: 0.01 K/UL (ref 0–0.04)
IMM GRANULOCYTES NFR BLD AUTO: 0.4 % (ref 0–0.5)
LYMPHOCYTES # BLD AUTO: 0.8 K/UL (ref 1–4.8)
LYMPHOCYTES NFR BLD: 29.9 % (ref 18–48)
MCH RBC QN AUTO: 25.3 PG (ref 27–31)
MCHC RBC AUTO-ENTMCNC: 30.1 G/DL (ref 32–36)
MCV RBC AUTO: 84 FL (ref 82–98)
MONOCYTES # BLD AUTO: 0.4 K/UL (ref 0.3–1)
MONOCYTES NFR BLD: 14.2 % (ref 4–15)
NEUTROPHILS # BLD AUTO: 1.3 K/UL (ref 1.8–7.7)
NEUTROPHILS NFR BLD: 51.2 % (ref 38–73)
NRBC BLD-RTO: 0 /100 WBC
PLATELET # BLD AUTO: 229 K/UL (ref 150–450)
PMV BLD AUTO: 10.2 FL (ref 9.2–12.9)
POTASSIUM SERPL-SCNC: 4.5 MMOL/L (ref 3.5–5.1)
PROT SERPL-MCNC: 7.2 G/DL (ref 6–8.4)
RBC # BLD AUTO: 3.79 M/UL (ref 4.6–6.2)
SODIUM SERPL-SCNC: 140 MMOL/L (ref 136–145)
WBC # BLD AUTO: 2.54 K/UL (ref 3.9–12.7)

## 2023-08-07 PROCEDURE — 80053 COMPREHEN METABOLIC PANEL: CPT | Performed by: INTERNAL MEDICINE

## 2023-08-07 PROCEDURE — 85025 COMPLETE CBC W/AUTO DIFF WBC: CPT | Performed by: INTERNAL MEDICINE

## 2023-08-07 PROCEDURE — 82378 CARCINOEMBRYONIC ANTIGEN: CPT | Performed by: INTERNAL MEDICINE

## 2023-08-08 ENCOUNTER — OFFICE VISIT (OUTPATIENT)
Dept: HEMATOLOGY/ONCOLOGY | Facility: CLINIC | Age: 73
End: 2023-08-08
Payer: MEDICARE

## 2023-08-08 ENCOUNTER — INFUSION (OUTPATIENT)
Dept: INFUSION THERAPY | Facility: HOSPITAL | Age: 73
End: 2023-08-08
Payer: MEDICARE

## 2023-08-08 VITALS
DIASTOLIC BLOOD PRESSURE: 61 MMHG | DIASTOLIC BLOOD PRESSURE: 63 MMHG | BODY MASS INDEX: 19.27 KG/M2 | HEART RATE: 76 BPM | HEIGHT: 64 IN | SYSTOLIC BLOOD PRESSURE: 101 MMHG | TEMPERATURE: 98 F | HEIGHT: 64 IN | TEMPERATURE: 98 F | HEART RATE: 73 BPM | BODY MASS INDEX: 19.29 KG/M2 | RESPIRATION RATE: 18 BRPM | WEIGHT: 113 LBS | OXYGEN SATURATION: 100 % | RESPIRATION RATE: 18 BRPM | SYSTOLIC BLOOD PRESSURE: 102 MMHG | WEIGHT: 112.88 LBS

## 2023-08-08 DIAGNOSIS — Z79.899 IMMUNODEFICIENCY DUE TO CHEMOTHERAPY: ICD-10-CM

## 2023-08-08 DIAGNOSIS — C18.9 METASTATIC COLON CANCER TO LIVER: Primary | ICD-10-CM

## 2023-08-08 DIAGNOSIS — D70.1 CHEMOTHERAPY INDUCED NEUTROPENIA: ICD-10-CM

## 2023-08-08 DIAGNOSIS — K59.03 DRUG-INDUCED CONSTIPATION: ICD-10-CM

## 2023-08-08 DIAGNOSIS — R39.9 LOWER URINARY TRACT SYMPTOMS (LUTS): ICD-10-CM

## 2023-08-08 DIAGNOSIS — T45.1X5A ANEMIA ASSOCIATED WITH CHEMOTHERAPY: ICD-10-CM

## 2023-08-08 DIAGNOSIS — D49.9 IMMUNODEFICIENCY SECONDARY TO NEOPLASM: ICD-10-CM

## 2023-08-08 DIAGNOSIS — T45.1X5A IMMUNODEFICIENCY DUE TO CHEMOTHERAPY: ICD-10-CM

## 2023-08-08 DIAGNOSIS — I10 PRIMARY HYPERTENSION: ICD-10-CM

## 2023-08-08 DIAGNOSIS — N17.9 AKI (ACUTE KIDNEY INJURY): ICD-10-CM

## 2023-08-08 DIAGNOSIS — D84.821 IMMUNODEFICIENCY DUE TO CHEMOTHERAPY: ICD-10-CM

## 2023-08-08 DIAGNOSIS — D64.81 ANEMIA ASSOCIATED WITH CHEMOTHERAPY: ICD-10-CM

## 2023-08-08 DIAGNOSIS — C78.7 METASTATIC COLON CANCER TO LIVER: Primary | ICD-10-CM

## 2023-08-08 DIAGNOSIS — R52 PAIN: ICD-10-CM

## 2023-08-08 DIAGNOSIS — T45.1X5A CHEMOTHERAPY INDUCED NEUTROPENIA: ICD-10-CM

## 2023-08-08 DIAGNOSIS — D84.81 IMMUNODEFICIENCY SECONDARY TO NEOPLASM: ICD-10-CM

## 2023-08-08 DIAGNOSIS — R63.4 WEIGHT DECREASE: ICD-10-CM

## 2023-08-08 DIAGNOSIS — E43 SEVERE MALNUTRITION: ICD-10-CM

## 2023-08-08 PROCEDURE — 96417 CHEMO IV INFUS EACH ADDL SEQ: CPT

## 2023-08-08 PROCEDURE — 3288F FALL RISK ASSESSMENT DOCD: CPT | Mod: CPTII,S$GLB,, | Performed by: REGISTERED NURSE

## 2023-08-08 PROCEDURE — 3078F DIAST BP <80 MM HG: CPT | Mod: CPTII,S$GLB,, | Performed by: REGISTERED NURSE

## 2023-08-08 PROCEDURE — 99999 PR PBB SHADOW E&M-EST. PATIENT-LVL IV: ICD-10-PCS | Mod: PBBFAC,,, | Performed by: REGISTERED NURSE

## 2023-08-08 PROCEDURE — 96416 CHEMO PROLONG INFUSE W/PUMP: CPT

## 2023-08-08 PROCEDURE — 99215 PR OFFICE/OUTPT VISIT, EST, LEVL V, 40-54 MIN: ICD-10-PCS | Mod: S$GLB,,, | Performed by: REGISTERED NURSE

## 2023-08-08 PROCEDURE — 1126F PR PAIN SEVERITY QUANTIFIED, NO PAIN PRESENT: ICD-10-PCS | Mod: CPTII,S$GLB,, | Performed by: REGISTERED NURSE

## 2023-08-08 PROCEDURE — 99999 PR PBB SHADOW E&M-EST. PATIENT-LVL IV: CPT | Mod: PBBFAC,,, | Performed by: REGISTERED NURSE

## 2023-08-08 PROCEDURE — 1159F PR MEDICATION LIST DOCUMENTED IN MEDICAL RECORD: ICD-10-PCS | Mod: CPTII,S$GLB,, | Performed by: REGISTERED NURSE

## 2023-08-08 PROCEDURE — 96415 CHEMO IV INFUSION ADDL HR: CPT

## 2023-08-08 PROCEDURE — 3074F SYST BP LT 130 MM HG: CPT | Mod: CPTII,S$GLB,, | Performed by: REGISTERED NURSE

## 2023-08-08 PROCEDURE — 1160F RVW MEDS BY RX/DR IN RCRD: CPT | Mod: CPTII,S$GLB,, | Performed by: REGISTERED NURSE

## 2023-08-08 PROCEDURE — 63600175 PHARM REV CODE 636 W HCPCS: Performed by: REGISTERED NURSE

## 2023-08-08 PROCEDURE — 1159F MED LIST DOCD IN RCRD: CPT | Mod: CPTII,S$GLB,, | Performed by: REGISTERED NURSE

## 2023-08-08 PROCEDURE — 1126F AMNT PAIN NOTED NONE PRSNT: CPT | Mod: CPTII,S$GLB,, | Performed by: REGISTERED NURSE

## 2023-08-08 PROCEDURE — 1101F PT FALLS ASSESS-DOCD LE1/YR: CPT | Mod: CPTII,S$GLB,, | Performed by: REGISTERED NURSE

## 2023-08-08 PROCEDURE — 96413 CHEMO IV INFUSION 1 HR: CPT

## 2023-08-08 PROCEDURE — 1101F PR PT FALLS ASSESS DOC 0-1 FALLS W/OUT INJ PAST YR: ICD-10-PCS | Mod: CPTII,S$GLB,, | Performed by: REGISTERED NURSE

## 2023-08-08 PROCEDURE — 3288F PR FALLS RISK ASSESSMENT DOCUMENTED: ICD-10-PCS | Mod: CPTII,S$GLB,, | Performed by: REGISTERED NURSE

## 2023-08-08 PROCEDURE — 96375 TX/PRO/DX INJ NEW DRUG ADDON: CPT

## 2023-08-08 PROCEDURE — 3078F PR MOST RECENT DIASTOLIC BLOOD PRESSURE < 80 MM HG: ICD-10-PCS | Mod: CPTII,S$GLB,, | Performed by: REGISTERED NURSE

## 2023-08-08 PROCEDURE — 3074F PR MOST RECENT SYSTOLIC BLOOD PRESSURE < 130 MM HG: ICD-10-PCS | Mod: CPTII,S$GLB,, | Performed by: REGISTERED NURSE

## 2023-08-08 PROCEDURE — 3008F PR BODY MASS INDEX (BMI) DOCUMENTED: ICD-10-PCS | Mod: CPTII,S$GLB,, | Performed by: REGISTERED NURSE

## 2023-08-08 PROCEDURE — 1160F PR REVIEW ALL MEDS BY PRESCRIBER/CLIN PHARMACIST DOCUMENTED: ICD-10-PCS | Mod: CPTII,S$GLB,, | Performed by: REGISTERED NURSE

## 2023-08-08 PROCEDURE — 99215 OFFICE O/P EST HI 40 MIN: CPT | Mod: S$GLB,,, | Performed by: REGISTERED NURSE

## 2023-08-08 PROCEDURE — 3008F BODY MASS INDEX DOCD: CPT | Mod: CPTII,S$GLB,, | Performed by: REGISTERED NURSE

## 2023-08-08 PROCEDURE — 25000003 PHARM REV CODE 250: Performed by: REGISTERED NURSE

## 2023-08-08 PROCEDURE — 96367 TX/PROPH/DG ADDL SEQ IV INF: CPT

## 2023-08-08 RX ORDER — EPINEPHRINE 0.3 MG/.3ML
0.3 INJECTION SUBCUTANEOUS ONCE AS NEEDED
Status: DISCONTINUED | OUTPATIENT
Start: 2023-08-08 | End: 2023-08-08 | Stop reason: HOSPADM

## 2023-08-08 RX ORDER — DIPHENHYDRAMINE HYDROCHLORIDE 50 MG/ML
50 INJECTION INTRAMUSCULAR; INTRAVENOUS ONCE AS NEEDED
Status: CANCELLED | OUTPATIENT
Start: 2023-08-08

## 2023-08-08 RX ORDER — HEPARIN 100 UNIT/ML
500 SYRINGE INTRAVENOUS
Status: CANCELLED | OUTPATIENT
Start: 2023-08-08

## 2023-08-08 RX ORDER — SODIUM CHLORIDE 0.9 % (FLUSH) 0.9 %
10 SYRINGE (ML) INJECTION
Status: CANCELLED | OUTPATIENT
Start: 2023-08-08

## 2023-08-08 RX ORDER — HEPARIN 100 UNIT/ML
500 SYRINGE INTRAVENOUS
Status: DISCONTINUED | OUTPATIENT
Start: 2023-08-08 | End: 2023-08-08 | Stop reason: HOSPADM

## 2023-08-08 RX ORDER — DIPHENHYDRAMINE HYDROCHLORIDE 50 MG/ML
50 INJECTION INTRAMUSCULAR; INTRAVENOUS ONCE AS NEEDED
Status: DISCONTINUED | OUTPATIENT
Start: 2023-08-08 | End: 2023-08-08 | Stop reason: HOSPADM

## 2023-08-08 RX ORDER — PROCHLORPERAZINE EDISYLATE 5 MG/ML
5 INJECTION INTRAMUSCULAR; INTRAVENOUS ONCE AS NEEDED
Status: CANCELLED
Start: 2023-08-08

## 2023-08-08 RX ORDER — ATROPINE SULFATE 0.4 MG/ML
0.4 INJECTION, SOLUTION ENDOTRACHEAL; INTRAMEDULLARY; INTRAMUSCULAR; INTRAVENOUS; SUBCUTANEOUS ONCE AS NEEDED
Status: CANCELLED | OUTPATIENT
Start: 2023-08-08

## 2023-08-08 RX ORDER — HEPARIN 100 UNIT/ML
500 SYRINGE INTRAVENOUS
Status: CANCELLED | OUTPATIENT
Start: 2023-08-10

## 2023-08-08 RX ORDER — PROCHLORPERAZINE EDISYLATE 5 MG/ML
5 INJECTION INTRAMUSCULAR; INTRAVENOUS ONCE AS NEEDED
Status: DISCONTINUED | OUTPATIENT
Start: 2023-08-08 | End: 2023-08-08 | Stop reason: HOSPADM

## 2023-08-08 RX ORDER — SODIUM CHLORIDE 0.9 % (FLUSH) 0.9 %
10 SYRINGE (ML) INJECTION
Status: DISCONTINUED | OUTPATIENT
Start: 2023-08-08 | End: 2023-08-08 | Stop reason: HOSPADM

## 2023-08-08 RX ORDER — SODIUM CHLORIDE 0.9 % (FLUSH) 0.9 %
10 SYRINGE (ML) INJECTION
Status: CANCELLED | OUTPATIENT
Start: 2023-08-10

## 2023-08-08 RX ORDER — ATROPINE SULFATE 0.4 MG/ML
0.4 INJECTION, SOLUTION ENDOTRACHEAL; INTRAMEDULLARY; INTRAMUSCULAR; INTRAVENOUS; SUBCUTANEOUS ONCE AS NEEDED
Status: COMPLETED | OUTPATIENT
Start: 2023-08-08 | End: 2023-08-08

## 2023-08-08 RX ORDER — EPINEPHRINE 0.3 MG/.3ML
0.3 INJECTION SUBCUTANEOUS ONCE AS NEEDED
Status: CANCELLED | OUTPATIENT
Start: 2023-08-08

## 2023-08-08 RX ADMIN — IRINOTECAN HYDROCHLORIDE 280 MG: 20 INJECTION, SOLUTION INTRAVENOUS at 01:08

## 2023-08-08 RX ADMIN — DEXAMETHASONE SODIUM PHOSPHATE 0.25 MG: 4 INJECTION, SOLUTION INTRA-ARTICULAR; INTRALESIONAL; INTRAMUSCULAR; INTRAVENOUS; SOFT TISSUE at 11:08

## 2023-08-08 RX ADMIN — ATROPINE SULFATE 0.4 MG: 0.4 INJECTION, SOLUTION INTRAVENOUS at 01:08

## 2023-08-08 RX ADMIN — FLUOROURACIL 3840 MG: 50 INJECTION, SOLUTION INTRAVENOUS at 03:08

## 2023-08-08 RX ADMIN — BEVACIZUMAB-AWWB 270 MG: 100 INJECTION, SOLUTION INTRAVENOUS at 12:08

## 2023-08-08 NOTE — PROGRESS NOTES
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 Bonilla  12/30/2022: Y-90 to R liver  1/27/2023: Y-90 to R liver  5/17/2023: Y-90 to R liver  7/11/2023: C1 FOLFIRI + Pema  7/26/2023: C2 FOLFIRI + Pema    Interval History:   He presents today with his daughter prior to cycle 3 of FOLFIRI plus avastin. Continues to do well overall. Energy level is good and is working out in the yard (under shade). Appetite is great and staying well hydrated. Had one day with 2 episodes of diarrhea, but otherwise bowels normal. Swelling in feet has resolved. Denies fever/chills, SOB, CP, palpitations, N/V/C, pain, blood in urine/stool. No other concerns.     ECOG status is 1.     Past Medical History:   Past Medical History:   Diagnosis Date    MARIA A (acute kidney injury) 8/18/2022    Hypertension     Metastatic colon cancer to liver 4/22/2021     Past Surgical HIstory:   Past Surgical History:   Procedure Laterality Date    COLONOSCOPY N/A 4/20/2021    Procedure: COLONOSCOPY with possible stent;  Surgeon: EDILMA Bonilla MD;  Location: [facility] ([address] [place] [address]);  Service: Colon and Rectal;  Laterality: N/A;    DIAGNOSTIC LAPAROSCOPY N/A 9/7/2022    Procedure: LAPAROSCOPY, DIAGNOSTIC;  Surgeon: Adrian Rodriguez MD;  Location: [facility] OR [address] [place] [address];  Service: General;  Laterality: N/A;     INSERTION OF TUNNELED CENTRAL VENOUS CATHETER (CVC) WITH SUBCUTANEOUS PORT N/A 5/3/2021    Procedure: [id number];  Surgeon: Francisco Layton MD;  Location: [facility] OR 2ND FLR;  Service: Vascular;  Laterality: N/A;    OMENTECTOMY N/A 10/20/2022    Procedure: OMENTECTOMY;  Surgeon: EDILMA Bonilla MD;  Location: [facility] OR 2ND FLR;  Service: Colon and Rectal;  Laterality: N/A;    RIGHT HEMICOLECTOMY N/A 10/20/2022    Procedure: HEMICOLECTOMY, RIGHT, extended;  Surgeon: EDILMA Bonilla MD;  Location: [facility] OR 2ND FLR;  Service: Colon and Rectal;  Laterality: N/A;  Extended right hemicolectomy CONSENT IN AM     Family History: No family history on file.    Social History:  reports that he has never smoked. He has never used smokeless tobacco. He reports that he does not currently use alcohol. He reports that he does not use drugs.    Allergies:  Review of patient's allergies indicates:  No Known Allergies    Medications:  Current Outpatient Medications   Medication Sig Dispense Refill    acetaminophen (TYLENOL) 500 MG tablet Take 1 tablet (500 mg total) by mouth every 6 (six) hours as needed for Pain (alternate with ibuprofen). (Patient not taking: Reported on 6/28/2023)  0    amLODIPine (NORVASC) 10 MG tablet Take 1 tablet (10 mg total) by mouth once daily. 90 tablet 3    capecitabine (XELODA) 500 MG Tab Take 3 tablets (1,500 mg) by mouth twice daily with food on days 1 thru 7 of 14 day cycles. (Patient not taking: Reported on 6/28/2023.) 84 tablet 5    ibuprofen (ADVIL,MOTRIN) 400 MG tablet Take 1 tablet (400 mg total) by mouth every 6 (six) hours as needed for Other (pain). Alternate with tylenol (Patient not taking: Reported on 6/28/2023)      ondansetron (ZOFRAN) 4 MG tablet Take 1 tablet (4 mg total) by mouth every 8 (eight) hours as needed for Nausea. (Patient not taking: Reported on 6/28/2023) 30 tablet 3    oxyCODONE (ROXICODONE) 5 MG immediate release tablet Take 1 tablet (5 mg total) by mouth every 6  "(six) hours as needed for Pain. (Patient not taking: Reported on 6/28/2023) 20 tablet 0    tamsulosin (FLOMAX) 0.4 mg Cap Take 1 capsule (0.4 mg total) by mouth once daily. (Patient not taking: Reported on 6/28/2023) 30 capsule 5     No current facility-administered medications for this visit.     Review of Systems   Constitutional:  Positive for fatigue. Negative for activity change, appetite change, chills, diaphoresis, fever and unexpected weight change.   HENT:  Negative for congestion, mouth sores, nosebleeds, postnasal drip, rhinorrhea, trouble swallowing and voice change.    Eyes:  Negative for pain and visual disturbance.   Respiratory:  Negative for cough, chest tightness, shortness of breath and wheezing.    Cardiovascular:  Negative for chest pain, palpitations and leg swelling.   Gastrointestinal:  Negative for abdominal distention, abdominal pain, blood in stool, constipation, diarrhea, nausea and vomiting.   Genitourinary:  Negative for difficulty urinating, dysuria, flank pain, frequency, hematuria and urgency.   Musculoskeletal:  Negative for arthralgias, back pain and myalgias.   Skin:  Negative for rash and wound.   Neurological:  Positive for numbness. Negative for dizziness, facial asymmetry, weakness, light-headedness and headaches.   Psychiatric/Behavioral:  Negative for agitation, behavioral problems, confusion, decreased concentration, dysphoric mood and sleep disturbance. The patient is not nervous/anxious.    All other systems reviewed and are negative.    ECOG Performance Status: 1     Objective:      Vitals:   Vitals:    08/08/23 1104   BP: 101/63   BP Location: Left arm   Patient Position: Sitting   BP Method: Medium (Automatic)   Pulse: 76   Resp: 18   Temp: 97.9 °F (36.6 °C)   TempSrc: Oral   SpO2: 100%   Weight: 51.2 kg (112 lb 15.8 oz)   Height: 5' 4" (1.626 m)     Physical Exam  Vitals reviewed.   Constitutional:       General: He is not in acute distress.     Appearance: Normal " appearance. He is not ill-appearing, toxic-appearing or diaphoretic.   HENT:      Head: Normocephalic and atraumatic.      Right Ear: External ear normal.      Left Ear: External ear normal.   Eyes:      General: No scleral icterus.     Extraocular Movements: Extraocular movements intact.      Conjunctiva/sclera: Conjunctivae normal.      Pupils: Pupils are equal, round, and reactive to light.   Cardiovascular:      Rate and Rhythm: Normal rate and regular rhythm.      Pulses: Normal pulses.      Heart sounds: Normal heart sounds. No murmur heard.  Pulmonary:      Effort: Pulmonary effort is normal. No respiratory distress.      Breath sounds: Normal breath sounds. No wheezing.   Chest:      Comments: Port to RCW, no signs of infection.  Abdominal:      General: Abdomen is flat. Bowel sounds are normal. There is no distension.      Palpations: Abdomen is soft. There is no mass.      Tenderness: There is no abdominal tenderness.      Comments: Vertical midline abdominal wound well healed   Musculoskeletal:         General: No swelling or deformity. Normal range of motion.      Right lower leg: No edema.      Left lower leg: No edema.   Skin:     Coloration: Skin is not jaundiced or pale.      Findings: No bruising, erythema or rash.   Neurological:      General: No focal deficit present.      Mental Status: He is alert and oriented to person, place, and time. Mental status is at baseline.      Cranial Nerves: No cranial nerve deficit.      Sensory: No sensory deficit.      Gait: Gait normal.   Psychiatric:         Mood and Affect: Mood normal.         Behavior: Behavior normal.         Thought Content: Thought content normal.         Judgment: Judgment normal.     Laboratory Data:  Lab Visit on 08/07/2023   Component Date Value Ref Range Status    WBC 08/07/2023 2.54 (L)  3.90 - 12.70 K/uL Final    RBC 08/07/2023 3.79 (L)  4.60 - 6.20 M/uL Final    Hemoglobin 08/07/2023 9.6 (L)  14.0 - 18.0 g/dL Final    Hematocrit  08/07/2023 31.9 (L)  40.0 - 54.0 % Final    MCV 08/07/2023 84  82 - 98 fL Final    MCH 08/07/2023 25.3 (L)  27.0 - 31.0 pg Final    MCHC 08/07/2023 30.1 (L)  32.0 - 36.0 g/dL Final    RDW 08/07/2023 20.3 (H)  11.5 - 14.5 % Final    Platelets 08/07/2023 229  150 - 450 K/uL Final    MPV 08/07/2023 10.2  9.2 - 12.9 fL Final    Immature Granulocytes 08/07/2023 0.4  0.0 - 0.5 % Final    Gran # (ANC) 08/07/2023 1.3 (L)  1.8 - 7.7 K/uL Final    Immature Grans (Abs) 08/07/2023 0.01  0.00 - 0.04 K/uL Final    Comment: Mild elevation in immature granulocytes is non specific and   can be seen in a variety of conditions including stress response,   acute inflammation, trauma and pregnancy. Correlation with other   laboratory and clinical findings is essential.      Lymph # 08/07/2023 0.8 (L)  1.0 - 4.8 K/uL Final    Mono # 08/07/2023 0.4  0.3 - 1.0 K/uL Final    Eos # 08/07/2023 0.1  0.0 - 0.5 K/uL Final    Baso # 08/07/2023 0.02  0.00 - 0.20 K/uL Final    nRBC 08/07/2023 0  0 /100 WBC Final    Gran % 08/07/2023 51.2  38.0 - 73.0 % Final    Lymph % 08/07/2023 29.9  18.0 - 48.0 % Final    Mono % 08/07/2023 14.2  4.0 - 15.0 % Final    Eosinophil % 08/07/2023 3.5  0.0 - 8.0 % Final    Basophil % 08/07/2023 0.8  0.0 - 1.9 % Final    Differential Method 08/07/2023 Automated   Final    Sodium 08/07/2023 140  136 - 145 mmol/L Final    Potassium 08/07/2023 4.5  3.5 - 5.1 mmol/L Final    Chloride 08/07/2023 106  95 - 110 mmol/L Final    CO2 08/07/2023 23  23 - 29 mmol/L Final    Glucose 08/07/2023 89  70 - 110 mg/dL Final    BUN 08/07/2023 11  8 - 23 mg/dL Final    Creatinine 08/07/2023 0.9  0.5 - 1.4 mg/dL Final    Calcium 08/07/2023 9.2  8.7 - 10.5 mg/dL Final    Total Protein 08/07/2023 7.2  6.0 - 8.4 g/dL Final    Albumin 08/07/2023 3.1 (L)  3.5 - 5.2 g/dL Final    Total Bilirubin 08/07/2023 0.9  0.1 - 1.0 mg/dL Final    Comment: For infants and newborns, interpretation of results should be based  on gestational age, weight and in  agreement with clinical  observations.    Premature Infant recommended reference ranges:  Up to 24 hours.............<8.0 mg/dL  Up to 48 hours............<12.0 mg/dL  3-5 days..................<15.0 mg/dL  6-29 days.................<15.0 mg/dL      Alkaline Phosphatase 08/07/2023 204 (H)  55 - 135 U/L Final    AST 08/07/2023 30  10 - 40 U/L Final    ALT 08/07/2023 17  10 - 44 U/L Final    eGFR 08/07/2023 >60.0  >60 mL/min/1.73 m^2 Final    Anion Gap 08/07/2023 11  8 - 16 mmol/L Final    CEA 08/07/2023 95.5 (H)  0.0 - 5.0 ng/mL Final    Comment: CEA Normal Range:  Non-Smokers: 0-3.0 ng/mL  Smokers:     0-5.0 ng/mL    The testing method is a chemiluminescent microparticle immunoassay   manufactured by [employer] and performed on the [employer]   or   [employer] system. Values obtained with different assay manufacturers   for   methods may be different and cannot be used interchangeably.     Lab Visit on 08/07/2023   Component Date Value Ref Range Status    Specimen UA 08/07/2023 Urine, Unspecified   Final    Color, UA 08/07/2023 Yellow  Yellow, Straw, Marixa Final    Appearance, UA 08/07/2023 Hazy (A)  Clear Final    pH, UA 08/07/2023 6.0  5.0 - 8.0 Final    Specific Gravity, UA 08/07/2023 1.015  1.005 - 1.030 Final    Protein, UA 08/07/2023 Negative  Negative Final    Comment: Recommend a 24 hour urine protein or a urine   protein/creatinine ratio if globulin induced proteinuria is  clinically suspected.      Glucose, UA 08/07/2023 Negative  Negative Final    Ketones, UA 08/07/2023 Negative  Negative Final    Bilirubin (UA) 08/07/2023 Negative  Negative Final    Occult Blood UA 08/07/2023 Negative  Negative Final    Nitrite, UA 08/07/2023 Negative  Negative Final    Leukocytes, UA 08/07/2023 Negative  Negative Final    RBC, UA 08/07/2023 1  0 - 4 /hpf Final    WBC, UA 08/07/2023 4  0 - 5 /hpf Final    Bacteria 08/07/2023 Occasional  None-Occ /hpf Final    Hyaline Casts, UA 08/07/2023 2 (A)  0-1/lpf /lpf Final     Microscopic Comment 08/07/2023 SEE COMMENT   Final    Comment: Other formed elements not mentioned in the report are not   present in the microscopic examination.        Assessment and Plan        No diagnosis found.    1-3.  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  Guardant 360 was negative for BRAF, VIRI, HER2 amplification.   Pretreatment CEA 57.  We had a long and sonia discussion with him about his diagnosis. Unfortunately, the disease is not curable but remains treatable and he has good performance status.   Restaging scans after 7 cycles of FOLFOX + Pema showed significant reduction in colonic mass and liver mass.   we switched to maintenance as of cycle 8 with 5FU + Pema  Restaging scans from March 2022 show stable disease.   MRI on 7/18/22 showing hepatic progression of disease in the right hepatic lobe noted on the exam. CT CAP on 8/26 shows some mild progression in both liver metastases.    Discussed case at colorectal tumor board 8/31/22 and had a diagnostic lap performed by Dr. Rodriguez on 9/7 to assess for any occult peritoneal disease. Findings from the diagnostic lap were negative. Fluid from around from around the primary mass was sent for cytology that was negative for malignancy.   Underwent primary tumor resection on 10/20/22 with Dr. Bonilla.    Meanwhile his liver mets had grown.  We discussed his case at CRC conference with plans for short term Y-90 and then restarting chemotherapy prior to considering any surgical options to his liver metastases.  He underwent Y-90 delivery on 12/30/22. Tolerated well.   CT CAP on 1/23/23 reviewed at CRC conference with good response to Y-90, no new lesions.  Underwent second Y-90 on 1/27/23. Can consider R hepatectomy pending follow-up imaging after Y-90.     Received cycle 42 of FOLFOX (omitted oxaliplatin again starting cycle 41 due to neuropathy) on 2/9/23.  Because of 5-FU shortage nationally, we have been holding  chemotherapy since mid-February 2023.  If he needs to restart chemotherapy (I.e. no plans for surgical resection), will place him on capecitabine maintenance for duration of 5-FU shortage.     Discussed at colorectal liver mets tumor board 3/16/23.  Plan for repeat Y-90 and then consideration of surgical resection and he will meet with liver transplant team as well.  Underwent Y-90 delivery 5/17/23 with IR.     Has been on maintenance Xeloda since late April 2023.    Met with liver transplant team but ultimately opted not to proceed with transplant as he was concerned about how he would tolerate a major surgery with the life changes that would come after transplant as well. They closed out his case.    He met with Dr. Rodriguez to discuss whether surgical resection is indicated for his liver mets.  He recommended repeat MRI.  Repeat MRI unfortunately showed disease progression while on Xeloda maintenance.  Similarly his CEA garrett precipitously, all in keeping with worsening disease.    We recommended we restart IV chemotherapy with FOLFIRI + Avastin (never had irinotecan).    Because of hyperbilirubinemia he received cycle 1 with just 5-FU + Avastin on 7/11/23. Tolerated this well. Bilirubin has improved.  Labs reviewed, ANC 1300. Adequate to proceed with cycle 3 today with FOLFIRI + Avastin.    -RTC in 2 weeks for next cycle. Will repeat imaging studies after 4 cycles.    Tempus: APC, CKS1B cng, ERCC3 cnl, PIK3CA; KENIA, TMB 12.1.    4. Hypertension   -- BP normal today. Feels well.   -- taking Amlodipine .   -- Following with PCP.   -- encouraged him and his daughter to monitor BP and HR closely at home.     5. MARIA A  -- Resolved. Cr normal.   -- Monitor. Encouraged continued hydration particularly with working outside.     6. Anemia  -- Hgb stable.  Monitor.  -- No signs of bleeding. Platelets normal.     7-10. Neoplasm related pain, weight loss, constipation  -- Pain very well controlled.  Has oxycodone 5mg to use  PRN.  -- Following with nutrition. Encouraged increased protein. Had been drinking Boost daily.  Weight down 6 lbs but notes has been working in the yard recently.  -- Continue Miralax daily for constipation.    11. Urinary hesitancy  -- Continue Flomax.  -- Reports improvement since starting medication.     Patient is in agreement with the proposed treatment plan. All questions were answered to the patient's satisfaction. Pt knows to call clinic if anything is needed before the next clinic visit.    Patient discussed with collaborating physician, Dr. Schuster.    At least 40 minutes were spent today on this encounter including face to face time with the patient, data gathering/interpretation and documentation.       Natividad Maher, MSN, APRN, ACCNS-AG  Hematology and Medical Oncology  Clinical Nurse Specialist to Dr. Schuster, Dr. Quijano & Dr. Mueller    Route Chart for Scheduling    Med Onc Chart Routing      Follow up with physician 2 weeks and 4 weeks. rtc in 2 weeks as scheduled. rtc in 4 weeks with labs, scans, to see dr. schuster for cycle 5   Follow up with RACHEL    Infusion scheduling note   treatment every 2 weeks, pump d/c on day 3   Injection scheduling note    Labs CBC, CMP, CEA and urinalysis   Scheduling:  Preferred lab:  Lab interval:     Imaging    Pharmacy appointment    Other referrals

## 2023-08-10 ENCOUNTER — INFUSION (OUTPATIENT)
Dept: INFUSION THERAPY | Facility: HOSPITAL | Age: 73
End: 2023-08-10
Payer: MEDICARE

## 2023-08-10 VITALS
SYSTOLIC BLOOD PRESSURE: 101 MMHG | TEMPERATURE: 98 F | RESPIRATION RATE: 18 BRPM | OXYGEN SATURATION: 100 % | HEART RATE: 76 BPM | DIASTOLIC BLOOD PRESSURE: 60 MMHG

## 2023-08-10 DIAGNOSIS — C78.7 METASTATIC COLON CANCER TO LIVER: Primary | ICD-10-CM

## 2023-08-10 DIAGNOSIS — C18.9 METASTATIC COLON CANCER TO LIVER: Primary | ICD-10-CM

## 2023-08-10 PROCEDURE — A4216 STERILE WATER/SALINE, 10 ML: HCPCS | Performed by: REGISTERED NURSE

## 2023-08-10 PROCEDURE — 63600175 PHARM REV CODE 636 W HCPCS: Performed by: REGISTERED NURSE

## 2023-08-10 PROCEDURE — 25000003 PHARM REV CODE 250: Performed by: REGISTERED NURSE

## 2023-08-10 RX ORDER — HEPARIN 100 UNIT/ML
500 SYRINGE INTRAVENOUS
Status: DISCONTINUED | OUTPATIENT
Start: 2023-08-10 | End: 2023-08-10 | Stop reason: HOSPADM

## 2023-08-10 RX ORDER — SODIUM CHLORIDE 0.9 % (FLUSH) 0.9 %
10 SYRINGE (ML) INJECTION
Status: DISCONTINUED | OUTPATIENT
Start: 2023-08-10 | End: 2023-08-10 | Stop reason: HOSPADM

## 2023-08-10 RX ADMIN — HEPARIN 500 UNITS: 100 SYRINGE at 01:08

## 2023-08-10 RX ADMIN — Medication 10 ML: at 01:08

## 2023-08-10 NOTE — NURSING
1340 Patient tolerated 5FU infusion with no complaints. Pump chamber is empty. Pump removed and port deaccessed.

## 2023-08-15 LAB
DNA RANGE(S) EXAMINED NAR: NORMAL
GENE DIS ANL INTERP-IMP: POSITIVE
GENE DIS ASSESSED: NORMAL
MSI CA SPEC-IMP: NOT DETECTED
REASON FOR STUDY: NORMAL
TEMPUS LCA: NORMAL
TEMPUS PORTAL: NORMAL
TEMPUS THERAPY1: NORMAL
TEMPUS THERAPY2: NORMAL
TEMPUS THERAPY3: NORMAL
TEMPUS THERAPYCOUNT: 3
TEMPUS TRIAL1: NORMAL
TEMPUS TRIAL2: NORMAL
TEMPUS TRIAL3: NORMAL
TEMPUS TRIALCOUNT: 3

## 2023-08-18 DIAGNOSIS — C78.7 METASTATIC COLON CANCER TO LIVER: Primary | ICD-10-CM

## 2023-08-18 DIAGNOSIS — C18.9 METASTATIC COLON CANCER TO LIVER: Primary | ICD-10-CM

## 2023-08-21 ENCOUNTER — LAB VISIT (OUTPATIENT)
Dept: LAB | Facility: HOSPITAL | Age: 73
End: 2023-08-21
Attending: INTERNAL MEDICINE
Payer: MEDICARE

## 2023-08-21 DIAGNOSIS — C78.7 METASTATIC COLON CANCER TO LIVER: ICD-10-CM

## 2023-08-21 DIAGNOSIS — C18.9 METASTATIC COLON CANCER TO LIVER: ICD-10-CM

## 2023-08-21 LAB
ALBUMIN SERPL BCP-MCNC: 3 G/DL (ref 3.5–5.2)
ALP SERPL-CCNC: 181 U/L (ref 55–135)
ALT SERPL W/O P-5'-P-CCNC: 16 U/L (ref 10–44)
ANION GAP SERPL CALC-SCNC: 8 MMOL/L (ref 8–16)
AST SERPL-CCNC: 24 U/L (ref 10–40)
BASOPHILS # BLD AUTO: 0.04 K/UL (ref 0–0.2)
BASOPHILS NFR BLD: 1.1 % (ref 0–1.9)
BILIRUB SERPL-MCNC: 1.1 MG/DL (ref 0.1–1)
BUN SERPL-MCNC: 7 MG/DL (ref 8–23)
CALCIUM SERPL-MCNC: 8.8 MG/DL (ref 8.7–10.5)
CEA SERPL-MCNC: 50.6 NG/ML (ref 0–5)
CHLORIDE SERPL-SCNC: 106 MMOL/L (ref 95–110)
CO2 SERPL-SCNC: 26 MMOL/L (ref 23–29)
CREAT SERPL-MCNC: 0.8 MG/DL (ref 0.5–1.4)
DIFFERENTIAL METHOD: ABNORMAL
EOSINOPHIL # BLD AUTO: 0.1 K/UL (ref 0–0.5)
EOSINOPHIL NFR BLD: 3.8 % (ref 0–8)
ERYTHROCYTE [DISTWIDTH] IN BLOOD BY AUTOMATED COUNT: 19.4 % (ref 11.5–14.5)
EST. GFR  (NO RACE VARIABLE): >60 ML/MIN/1.73 M^2
GLUCOSE SERPL-MCNC: 88 MG/DL (ref 70–110)
HCT VFR BLD AUTO: 31.7 % (ref 40–54)
HGB BLD-MCNC: 9.6 G/DL (ref 14–18)
IMM GRANULOCYTES # BLD AUTO: 0.01 K/UL (ref 0–0.04)
IMM GRANULOCYTES NFR BLD AUTO: 0.3 % (ref 0–0.5)
LYMPHOCYTES # BLD AUTO: 1.1 K/UL (ref 1–4.8)
LYMPHOCYTES NFR BLD: 30 % (ref 18–48)
MCH RBC QN AUTO: 25.5 PG (ref 27–31)
MCHC RBC AUTO-ENTMCNC: 30.3 G/DL (ref 32–36)
MCV RBC AUTO: 84 FL (ref 82–98)
MONOCYTES # BLD AUTO: 0.7 K/UL (ref 0.3–1)
MONOCYTES NFR BLD: 18.3 % (ref 4–15)
NEUTROPHILS # BLD AUTO: 1.7 K/UL (ref 1.8–7.7)
NEUTROPHILS NFR BLD: 46.5 % (ref 38–73)
NRBC BLD-RTO: 0 /100 WBC
PLATELET # BLD AUTO: 271 K/UL (ref 150–450)
PMV BLD AUTO: 10.6 FL (ref 9.2–12.9)
POTASSIUM SERPL-SCNC: 3.5 MMOL/L (ref 3.5–5.1)
PROT SERPL-MCNC: 6.8 G/DL (ref 6–8.4)
RBC # BLD AUTO: 3.77 M/UL (ref 4.6–6.2)
SODIUM SERPL-SCNC: 140 MMOL/L (ref 136–145)
WBC # BLD AUTO: 3.67 K/UL (ref 3.9–12.7)

## 2023-08-21 PROCEDURE — 80053 COMPREHEN METABOLIC PANEL: CPT | Performed by: INTERNAL MEDICINE

## 2023-08-21 PROCEDURE — 85025 COMPLETE CBC W/AUTO DIFF WBC: CPT | Performed by: INTERNAL MEDICINE

## 2023-08-21 PROCEDURE — 82378 CARCINOEMBRYONIC ANTIGEN: CPT | Performed by: INTERNAL MEDICINE

## 2023-08-22 ENCOUNTER — OFFICE VISIT (OUTPATIENT)
Dept: HEMATOLOGY/ONCOLOGY | Facility: CLINIC | Age: 73
End: 2023-08-22
Payer: MEDICARE

## 2023-08-22 ENCOUNTER — INFUSION (OUTPATIENT)
Dept: INFUSION THERAPY | Facility: HOSPITAL | Age: 73
End: 2023-08-22
Payer: MEDICARE

## 2023-08-22 VITALS
HEIGHT: 64 IN | TEMPERATURE: 98 F | SYSTOLIC BLOOD PRESSURE: 111 MMHG | DIASTOLIC BLOOD PRESSURE: 66 MMHG | BODY MASS INDEX: 19.44 KG/M2 | RESPIRATION RATE: 18 BRPM | OXYGEN SATURATION: 100 % | HEART RATE: 73 BPM | WEIGHT: 113.88 LBS

## 2023-08-22 VITALS
DIASTOLIC BLOOD PRESSURE: 64 MMHG | RESPIRATION RATE: 18 BRPM | SYSTOLIC BLOOD PRESSURE: 122 MMHG | TEMPERATURE: 98 F | BODY MASS INDEX: 19.44 KG/M2 | WEIGHT: 113.88 LBS | HEART RATE: 63 BPM | HEIGHT: 64 IN

## 2023-08-22 DIAGNOSIS — N17.9 AKI (ACUTE KIDNEY INJURY): ICD-10-CM

## 2023-08-22 DIAGNOSIS — R52 PAIN: ICD-10-CM

## 2023-08-22 DIAGNOSIS — C78.7 METASTATIC COLON CANCER TO LIVER: Primary | ICD-10-CM

## 2023-08-22 DIAGNOSIS — E43 SEVERE MALNUTRITION: ICD-10-CM

## 2023-08-22 DIAGNOSIS — T45.1X5A ANEMIA ASSOCIATED WITH CHEMOTHERAPY: ICD-10-CM

## 2023-08-22 DIAGNOSIS — C18.9 METASTATIC COLON CANCER TO LIVER: Primary | ICD-10-CM

## 2023-08-22 DIAGNOSIS — D84.821 IMMUNODEFICIENCY DUE TO CHEMOTHERAPY: ICD-10-CM

## 2023-08-22 DIAGNOSIS — D84.81 IMMUNODEFICIENCY SECONDARY TO NEOPLASM: ICD-10-CM

## 2023-08-22 DIAGNOSIS — K59.03 DRUG-INDUCED CONSTIPATION: ICD-10-CM

## 2023-08-22 DIAGNOSIS — R63.4 WEIGHT DECREASE: ICD-10-CM

## 2023-08-22 DIAGNOSIS — D49.9 IMMUNODEFICIENCY SECONDARY TO NEOPLASM: ICD-10-CM

## 2023-08-22 DIAGNOSIS — Z79.899 IMMUNODEFICIENCY DUE TO CHEMOTHERAPY: ICD-10-CM

## 2023-08-22 DIAGNOSIS — D64.81 ANEMIA ASSOCIATED WITH CHEMOTHERAPY: ICD-10-CM

## 2023-08-22 DIAGNOSIS — I10 PRIMARY HYPERTENSION: ICD-10-CM

## 2023-08-22 DIAGNOSIS — T45.1X5A IMMUNODEFICIENCY DUE TO CHEMOTHERAPY: ICD-10-CM

## 2023-08-22 DIAGNOSIS — R39.9 LOWER URINARY TRACT SYMPTOMS (LUTS): ICD-10-CM

## 2023-08-22 PROCEDURE — 96417 CHEMO IV INFUS EACH ADDL SEQ: CPT

## 2023-08-22 PROCEDURE — 96416 CHEMO PROLONG INFUSE W/PUMP: CPT

## 2023-08-22 PROCEDURE — 96413 CHEMO IV INFUSION 1 HR: CPT

## 2023-08-22 PROCEDURE — 1101F PR PT FALLS ASSESS DOC 0-1 FALLS W/OUT INJ PAST YR: ICD-10-PCS | Mod: CPTII,S$GLB,, | Performed by: INTERNAL MEDICINE

## 2023-08-22 PROCEDURE — 1126F PR PAIN SEVERITY QUANTIFIED, NO PAIN PRESENT: ICD-10-PCS | Mod: CPTII,S$GLB,, | Performed by: INTERNAL MEDICINE

## 2023-08-22 PROCEDURE — 3288F PR FALLS RISK ASSESSMENT DOCUMENTED: ICD-10-PCS | Mod: CPTII,S$GLB,, | Performed by: INTERNAL MEDICINE

## 2023-08-22 PROCEDURE — 99215 PR OFFICE/OUTPT VISIT, EST, LEVL V, 40-54 MIN: ICD-10-PCS | Mod: S$GLB,,, | Performed by: INTERNAL MEDICINE

## 2023-08-22 PROCEDURE — 99999 PR PBB SHADOW E&M-EST. PATIENT-LVL III: ICD-10-PCS | Mod: PBBFAC,,, | Performed by: INTERNAL MEDICINE

## 2023-08-22 PROCEDURE — 1101F PT FALLS ASSESS-DOCD LE1/YR: CPT | Mod: CPTII,S$GLB,, | Performed by: INTERNAL MEDICINE

## 2023-08-22 PROCEDURE — 25000003 PHARM REV CODE 250: Performed by: INTERNAL MEDICINE

## 2023-08-22 PROCEDURE — 96367 TX/PROPH/DG ADDL SEQ IV INF: CPT

## 2023-08-22 PROCEDURE — 3008F BODY MASS INDEX DOCD: CPT | Mod: CPTII,S$GLB,, | Performed by: INTERNAL MEDICINE

## 2023-08-22 PROCEDURE — 99999 PR PBB SHADOW E&M-EST. PATIENT-LVL III: CPT | Mod: PBBFAC,,, | Performed by: INTERNAL MEDICINE

## 2023-08-22 PROCEDURE — 3078F PR MOST RECENT DIASTOLIC BLOOD PRESSURE < 80 MM HG: ICD-10-PCS | Mod: CPTII,S$GLB,, | Performed by: INTERNAL MEDICINE

## 2023-08-22 PROCEDURE — 3008F PR BODY MASS INDEX (BMI) DOCUMENTED: ICD-10-PCS | Mod: CPTII,S$GLB,, | Performed by: INTERNAL MEDICINE

## 2023-08-22 PROCEDURE — 3288F FALL RISK ASSESSMENT DOCD: CPT | Mod: CPTII,S$GLB,, | Performed by: INTERNAL MEDICINE

## 2023-08-22 PROCEDURE — 1126F AMNT PAIN NOTED NONE PRSNT: CPT | Mod: CPTII,S$GLB,, | Performed by: INTERNAL MEDICINE

## 2023-08-22 PROCEDURE — 3078F DIAST BP <80 MM HG: CPT | Mod: CPTII,S$GLB,, | Performed by: INTERNAL MEDICINE

## 2023-08-22 PROCEDURE — 3074F PR MOST RECENT SYSTOLIC BLOOD PRESSURE < 130 MM HG: ICD-10-PCS | Mod: CPTII,S$GLB,, | Performed by: INTERNAL MEDICINE

## 2023-08-22 PROCEDURE — 99215 OFFICE O/P EST HI 40 MIN: CPT | Mod: S$GLB,,, | Performed by: INTERNAL MEDICINE

## 2023-08-22 PROCEDURE — 3074F SYST BP LT 130 MM HG: CPT | Mod: CPTII,S$GLB,, | Performed by: INTERNAL MEDICINE

## 2023-08-22 PROCEDURE — 63600175 PHARM REV CODE 636 W HCPCS: Performed by: INTERNAL MEDICINE

## 2023-08-22 RX ORDER — ATROPINE SULFATE 0.4 MG/ML
0.4 INJECTION, SOLUTION ENDOTRACHEAL; INTRAMEDULLARY; INTRAMUSCULAR; INTRAVENOUS; SUBCUTANEOUS ONCE AS NEEDED
Status: DISCONTINUED | OUTPATIENT
Start: 2023-08-22 | End: 2023-08-22 | Stop reason: HOSPADM

## 2023-08-22 RX ORDER — PROCHLORPERAZINE EDISYLATE 5 MG/ML
5 INJECTION INTRAMUSCULAR; INTRAVENOUS ONCE AS NEEDED
Status: DISCONTINUED | OUTPATIENT
Start: 2023-08-22 | End: 2023-08-22 | Stop reason: HOSPADM

## 2023-08-22 RX ORDER — HEPARIN 100 UNIT/ML
500 SYRINGE INTRAVENOUS
Status: DISCONTINUED | OUTPATIENT
Start: 2023-08-22 | End: 2023-08-22 | Stop reason: HOSPADM

## 2023-08-22 RX ORDER — ATROPINE SULFATE 0.4 MG/ML
0.4 INJECTION, SOLUTION ENDOTRACHEAL; INTRAMEDULLARY; INTRAMUSCULAR; INTRAVENOUS; SUBCUTANEOUS ONCE AS NEEDED
Status: CANCELLED | OUTPATIENT
Start: 2023-08-22

## 2023-08-22 RX ORDER — EPINEPHRINE 0.3 MG/.3ML
0.3 INJECTION SUBCUTANEOUS ONCE AS NEEDED
Status: CANCELLED | OUTPATIENT
Start: 2023-08-22

## 2023-08-22 RX ORDER — DIPHENHYDRAMINE HYDROCHLORIDE 50 MG/ML
50 INJECTION INTRAMUSCULAR; INTRAVENOUS ONCE AS NEEDED
Status: CANCELLED | OUTPATIENT
Start: 2023-08-22

## 2023-08-22 RX ORDER — HEPARIN 100 UNIT/ML
500 SYRINGE INTRAVENOUS
Status: CANCELLED | OUTPATIENT
Start: 2023-08-22

## 2023-08-22 RX ORDER — SODIUM CHLORIDE 0.9 % (FLUSH) 0.9 %
10 SYRINGE (ML) INJECTION
Status: CANCELLED | OUTPATIENT
Start: 2023-08-24

## 2023-08-22 RX ORDER — PROCHLORPERAZINE EDISYLATE 5 MG/ML
5 INJECTION INTRAMUSCULAR; INTRAVENOUS ONCE AS NEEDED
Status: CANCELLED
Start: 2023-08-22

## 2023-08-22 RX ORDER — HEPARIN 100 UNIT/ML
500 SYRINGE INTRAVENOUS
Status: CANCELLED | OUTPATIENT
Start: 2023-08-24

## 2023-08-22 RX ORDER — SODIUM CHLORIDE 0.9 % (FLUSH) 0.9 %
10 SYRINGE (ML) INJECTION
Status: CANCELLED | OUTPATIENT
Start: 2023-08-22

## 2023-08-22 RX ORDER — SODIUM CHLORIDE 0.9 % (FLUSH) 0.9 %
10 SYRINGE (ML) INJECTION
Status: DISCONTINUED | OUTPATIENT
Start: 2023-08-22 | End: 2023-08-22 | Stop reason: HOSPADM

## 2023-08-22 RX ORDER — DIPHENHYDRAMINE HYDROCHLORIDE 50 MG/ML
50 INJECTION INTRAMUSCULAR; INTRAVENOUS ONCE AS NEEDED
Status: DISCONTINUED | OUTPATIENT
Start: 2023-08-22 | End: 2023-08-22 | Stop reason: HOSPADM

## 2023-08-22 RX ORDER — EPINEPHRINE 0.3 MG/.3ML
0.3 INJECTION SUBCUTANEOUS ONCE AS NEEDED
Status: DISCONTINUED | OUTPATIENT
Start: 2023-08-22 | End: 2023-08-22 | Stop reason: HOSPADM

## 2023-08-22 RX ADMIN — IRINOTECAN HYDROCHLORIDE 280 MG: 20 INJECTION, SOLUTION INTRAVENOUS at 03:08

## 2023-08-22 RX ADMIN — FLUOROURACIL 3840 MG: 50 INJECTION, SOLUTION INTRAVENOUS at 04:08

## 2023-08-22 RX ADMIN — DEXAMETHASONE SODIUM PHOSPHATE 0.25 MG: 4 INJECTION, SOLUTION INTRA-ARTICULAR; INTRALESIONAL; INTRAMUSCULAR; INTRAVENOUS; SOFT TISSUE at 02:08

## 2023-08-22 RX ADMIN — BEVACIZUMAB-AWWB 270 MG: 100 INJECTION, SOLUTION INTRAVENOUS at 02:08

## 2023-08-22 NOTE — NURSING
1222  Pt here for Mvasi, FOLFIR infusion/pump, accompanied by daughter, no complaints or concerns, reports tolerating treatment; discussed treatment plan for today, all questions answered and pt agrees to proceed

## 2023-08-22 NOTE — PROGRESS NOTES
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 Bonilla  12/30/2022: Y-90 to R liver  1/27/2023: Y-90 to R liver  5/17/2023: Y-90 to R liver  7/11/2023: C1 FOLFIRI + Pema  7/26/2023: C2 FOLFIRI + Pema  8/8/2023: C3 FOLFIRI + Pema      Interval History:   He presents today with his daughter prior to cycle 4 of FOLFIRI plus avastin. Continues to feel well.  Bowel movements are normal.  He denies pain.  No nausea or vomiting.  Continues to work in the yard in the evenings.  No other concerns.     ECOG status is 1.     Past Medical History:   Past Medical History:   Diagnosis Date    MARIA A (acute kidney injury) 8/18/2022    Hypertension     Metastatic colon cancer to liver 4/22/2021     Past Surgical HIstory:   Past Surgical History:   Procedure Laterality Date    COLONOSCOPY N/A 4/20/2021    Procedure: COLONOSCOPY with possible stent;  Surgeon: EDILMA Bonilla MD;  Location: [facility] (2ND FLR);  Service: Colon and Rectal;  Laterality: N/A;    DIAGNOSTIC LAPAROSCOPY N/A 9/7/2022    Procedure: LAPAROSCOPY, DIAGNOSTIC;  Surgeon: Adrian Rodriguez MD;  Location: [facility] OR 2ND FLR;  Service: General;  Laterality: N/A;    INSERTION OF TUNNELED CENTRAL VENOUS CATHETER (CVC) WITH SUBCUTANEOUS PORT N/A 5/3/2021    Procedure: [id number];  Surgeon: Francisco Layton MD;   Location: [facility] OR 2ND FLR;  Service: Vascular;  Laterality: N/A;    OMENTECTOMY N/A 10/20/2022    Procedure: OMENTECTOMY;  Surgeon: EDILMA Bonilla MD;  Location: [facility] OR 2ND FLR;  Service: Colon and Rectal;  Laterality: N/A;    RIGHT HEMICOLECTOMY N/A 10/20/2022    Procedure: HEMICOLECTOMY, RIGHT, extended;  Surgeon: EDILMA Bonilla MD;  Location: [facility] OR 2ND FLR;  Service: Colon and Rectal;  Laterality: N/A;  Extended right hemicolectomy CONSENT IN AM     Family History: No family history on file.    Social History:  reports that he has never smoked. He has never used smokeless tobacco. He reports that he does not currently use alcohol. He reports that he does not use drugs.    Allergies:  Review of patient's allergies indicates:  No Known Allergies    Medications:  Current Outpatient Medications   Medication Sig Dispense Refill    acetaminophen (TYLENOL) 500 MG tablet Take 1 tablet (500 mg total) by mouth every 6 (six) hours as needed for Pain (alternate with ibuprofen). (Patient not taking: Reported on 6/28/2023)  0    amLODIPine (NORVASC) 10 MG tablet Take 1 tablet (10 mg total) by mouth once daily. 90 tablet 3    capecitabine (XELODA) 500 MG Tab Take 3 tablets (1,500 mg) by mouth twice daily with food on days 1 thru 7 of 14 day cycles. (Patient not taking: Reported on 6/28/2023.) 84 tablet 5    ibuprofen (ADVIL,MOTRIN) 400 MG tablet Take 1 tablet (400 mg total) by mouth every 6 (six) hours as needed for Other (pain). Alternate with tylenol (Patient not taking: Reported on 6/28/2023)      ondansetron (ZOFRAN) 4 MG tablet Take 1 tablet (4 mg total) by mouth every 8 (eight) hours as needed for Nausea. (Patient not taking: Reported on 6/28/2023) 30 tablet 3    oxyCODONE (ROXICODONE) 5 MG immediate release tablet Take 1 tablet (5 mg total) by mouth every 6 (six) hours as needed for Pain. (Patient not taking: Reported on 6/28/2023) 20 tablet 0    tamsulosin (FLOMAX) 0.4 mg Cap Take 1 capsule (0.4 mg total) by  mouth once daily. (Patient not taking: Reported on 6/28/2023) 30 capsule 5     No current facility-administered medications for this visit.     Facility-Administered Medications Ordered in Other Visits   Medication Dose Route Frequency Provider Last Rate Last Admin    alteplase injection 2 mg  2 mg Intra-Catheter PRN Bunny Schuster MD        atropine injection 0.4 mg  0.4 mg Intravenous Once PRN Bunny Schuster MD        diphenhydrAMINE injection 50 mg  50 mg Intravenous Once PRN Bunny Schuster MD        EPINEPHrine (EPIPEN) 0.3 mg/0.3 mL pen injection 0.3 mg  0.3 mg Intramuscular Once PRN Bunny Schuster MD        fluorouracil (ADRUCIL) 2,400 mg/m2 = 3,840 mg in sodium chloride 0.9% 100 mL chemo infusion  2,400 mg/m2 (Treatment Plan Recorded) Intravenous over 46 hr Bunny Schuster MD        heparin, porcine (PF) 100 unit/mL injection flush 500 Units  500 Units Intravenous PRN Bunny Schuster MD        hydrocortisone sodium succinate injection 100 mg  100 mg Intravenous Once PRN Bunny Schuster MD        irinotecan (CAMPTOSAR) 280 mg in sodium chloride 0.9% 579 mL chemo infusion  280 mg Intravenous 1 time in Clinic/HOD Bunny Schuster  mL/hr at 08/22/23 1520 280 mg at 08/22/23 1520    prochlorperazine injection Soln 5 mg  5 mg Intravenous Once PRN Bunny Schuster MD        sodium chloride 0.9% 250 mL flush bag   Intravenous 1 time in Clinic/HOD Bunny Schuster MD        sodium chloride 0.9% flush 10 mL  10 mL Intravenous PRN Bunny Schuster MD         Review of Systems   Constitutional:  Positive for fatigue. Negative for activity change, appetite change, chills, diaphoresis, fever and unexpected weight change.   HENT:  Negative for congestion, mouth sores, nosebleeds, postnasal drip, rhinorrhea, trouble swallowing and voice change.    Eyes:  Negative for pain and visual disturbance.   Respiratory:  Negative for cough, chest tightness,  "shortness of breath and wheezing.    Cardiovascular:  Negative for chest pain, palpitations and leg swelling.   Gastrointestinal:  Negative for abdominal distention, abdominal pain, blood in stool, constipation, diarrhea, nausea and vomiting.   Genitourinary:  Negative for difficulty urinating, dysuria, flank pain, frequency, hematuria and urgency.   Musculoskeletal:  Negative for arthralgias, back pain and myalgias.   Skin:  Negative for rash and wound.   Neurological:  Positive for numbness. Negative for dizziness, facial asymmetry, weakness, light-headedness and headaches.   Psychiatric/Behavioral:  Negative for agitation, behavioral problems, confusion, decreased concentration, dysphoric mood and sleep disturbance. The patient is not nervous/anxious.    All other systems reviewed and are negative.    ECOG Performance Status: 1     Objective:      Vitals:   Vitals:    08/22/23 1134   BP: 111/66   BP Location: Right arm   Patient Position: Sitting   BP Method: Medium (Automatic)   Pulse: 73   Resp: 18   Temp: 97.6 °F (36.4 °C)   TempSrc: Oral   SpO2: 100%   Weight: 51.6 kg (113 lb 13.9 oz)   Height: 5' 4" (1.626 m)     Physical Exam  Vitals reviewed.   Constitutional:       General: He is not in acute distress.     Appearance: Normal appearance. He is not ill-appearing, toxic-appearing or diaphoretic.   HENT:      Head: Normocephalic and atraumatic.      Right Ear: External ear normal.      Left Ear: External ear normal.   Eyes:      General: No scleral icterus.     Extraocular Movements: Extraocular movements intact.      Conjunctiva/sclera: Conjunctivae normal.      Pupils: Pupils are equal, round, and reactive to light.   Cardiovascular:      Rate and Rhythm: Normal rate and regular rhythm.      Pulses: Normal pulses.      Heart sounds: Normal heart sounds. No murmur heard.  Pulmonary:      Effort: Pulmonary effort is normal. No respiratory distress.      Breath sounds: Normal breath sounds. No wheezing. "   Chest:      Comments: Port to RCW, no signs of infection.  Abdominal:      General: Abdomen is flat. Bowel sounds are normal. There is no distension.      Palpations: Abdomen is soft. There is no mass.      Tenderness: There is no abdominal tenderness.      Comments: Vertical midline abdominal wound well healed   Musculoskeletal:         General: No swelling or deformity. Normal range of motion.      Right lower leg: No edema.      Left lower leg: No edema.   Skin:     Coloration: Skin is not jaundiced or pale.      Findings: No bruising, erythema or rash.   Neurological:      General: No focal deficit present.      Mental Status: He is alert and oriented to person, place, and time. Mental status is at baseline.      Cranial Nerves: No cranial nerve deficit.      Sensory: No sensory deficit.      Gait: Gait normal.   Psychiatric:         Mood and Affect: Mood normal.         Behavior: Behavior normal.         Thought Content: Thought content normal.         Judgment: Judgment normal.     Laboratory Data:  Lab Visit on 08/21/2023   Component Date Value Ref Range Status    WBC 08/21/2023 3.67 (L)  3.90 - 12.70 K/uL Final    RBC 08/21/2023 3.77 (L)  4.60 - 6.20 M/uL Final    Hemoglobin 08/21/2023 9.6 (L)  14.0 - 18.0 g/dL Final    Hematocrit 08/21/2023 31.7 (L)  40.0 - 54.0 % Final    MCV 08/21/2023 84  82 - 98 fL Final    MCH 08/21/2023 25.5 (L)  27.0 - 31.0 pg Final    MCHC 08/21/2023 30.3 (L)  32.0 - 36.0 g/dL Final    RDW 08/21/2023 19.4 (H)  11.5 - 14.5 % Final    Platelets 08/21/2023 271  150 - 450 K/uL Final    MPV 08/21/2023 10.6  9.2 - 12.9 fL Final    Immature Granulocytes 08/21/2023 0.3  0.0 - 0.5 % Final    Gran # (ANC) 08/21/2023 1.7 (L)  1.8 - 7.7 K/uL Final    Immature Grans (Abs) 08/21/2023 0.01  0.00 - 0.04 K/uL Final    Comment: Mild elevation in immature granulocytes is non specific and   can be seen in a variety of conditions including stress response,   acute inflammation, trauma and pregnancy.  Correlation with other   laboratory and clinical findings is essential.      Lymph # 08/21/2023 1.1  1.0 - 4.8 K/uL Final    Mono # 08/21/2023 0.7  0.3 - 1.0 K/uL Final    Eos # 08/21/2023 0.1  0.0 - 0.5 K/uL Final    Baso # 08/21/2023 0.04  0.00 - 0.20 K/uL Final    nRBC 08/21/2023 0  0 /100 WBC Final    Gran % 08/21/2023 46.5  38.0 - 73.0 % Final    Lymph % 08/21/2023 30.0  18.0 - 48.0 % Final    Mono % 08/21/2023 18.3 (H)  4.0 - 15.0 % Final    Eosinophil % 08/21/2023 3.8  0.0 - 8.0 % Final    Basophil % 08/21/2023 1.1  0.0 - 1.9 % Final    Differential Method 08/21/2023 Automated   Final    Sodium 08/21/2023 140  136 - 145 mmol/L Final    Potassium 08/21/2023 3.5  3.5 - 5.1 mmol/L Final    Chloride 08/21/2023 106  95 - 110 mmol/L Final    CO2 08/21/2023 26  23 - 29 mmol/L Final    Glucose 08/21/2023 88  70 - 110 mg/dL Final    BUN 08/21/2023 7 (L)  8 - 23 mg/dL Final    Creatinine 08/21/2023 0.8  0.5 - 1.4 mg/dL Final    Calcium 08/21/2023 8.8  8.7 - 10.5 mg/dL Final    Total Protein 08/21/2023 6.8  6.0 - 8.4 g/dL Final    Albumin 08/21/2023 3.0 (L)  3.5 - 5.2 g/dL Final    Total Bilirubin 08/21/2023 1.1 (H)  0.1 - 1.0 mg/dL Final    Comment: For infants and newborns, interpretation of results should be based  on gestational age, weight and in agreement with clinical  observations.    Premature Infant recommended reference ranges:  Up to 24 hours.............<8.0 mg/dL  Up to 48 hours............<12.0 mg/dL  3-5 days..................<15.0 mg/dL  6-29 days.................<15.0 mg/dL      Alkaline Phosphatase 08/21/2023 181 (H)  55 - 135 U/L Final    AST 08/21/2023 24  10 - 40 U/L Final    ALT 08/21/2023 16  10 - 44 U/L Final    eGFR 08/21/2023 >60.0  >60 mL/min/1.73 m^2 Final    Anion Gap 08/21/2023 8  8 - 16 mmol/L Final    CEA 08/21/2023 50.6 (H)  0.0 - 5.0 ng/mL Final    Comment: CEA Normal Range:  Non-Smokers: 0-3.0 ng/mL  Smokers:     0-5.0 ng/mL    The testing method is a chemiluminescent microparticle  immunoassay   manufactured by [employer] and performed on the    or   [employer] system. Values obtained with different assay manufacturers   for   methods may be different and cannot be used interchangeably.     Lab Visit on 08/21/2023   Component Date Value Ref Range Status    Specimen UA 08/21/2023 Urine, Clean Catch   Final    Color, UA 08/21/2023 Yellow  Yellow, Straw, [place] Final    Appearance, UA 08/21/2023 Cloudy (A)  Clear Final    pH, UA 08/21/2023 5.0  5.0 - 8.0 Final    Specific Gravity, UA 08/21/2023 1.010  1.005 - 1.030 Final    Protein, UA 08/21/2023 1+ (A)  Negative Final    Comment: Recommend a 24 hour urine protein or a urine   protein/creatinine ratio if globulin induced proteinuria is  clinically suspected.      Glucose, UA 08/21/2023 Negative  Negative Final    Ketones, UA 08/21/2023 Negative  Negative Final    Bilirubin (UA) 08/21/2023 Negative  Negative Final    Occult Blood UA 08/21/2023 1+ (A)  Negative Final    Nitrite, UA 08/21/2023 Negative  Negative Final    Leukocytes, UA 08/21/2023 2+ (A)  Negative Final    RBC, UA 08/21/2023 3  0 - 4 /hpf Final    WBC, UA 08/21/2023 22 (H)  0 - 5 /hpf Final    Bacteria 08/21/2023 Occasional  None-Occ /hpf Final    Squam Epithel, UA 08/21/2023 2  /hpf Final    Hyaline Casts, UA 08/21/2023 0  0-1/lpf /lpf Final    Microscopic Comment 08/21/2023 SEE COMMENT   Final    Comment: Other formed elements not mentioned in the report are not   present in the microscopic examination.        Assessment and Plan        1. 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 Immunodeficiency secondary to neoplasm    4. Primary hypertension    5. MARIA A (acute kidney injury)    6. Anemia associated with chemotherapy    7. Pain    8. Drug-induced constipation    9. Weight decrease    10. Severe malnutrition    11. Lower urinary tract symptoms (LUTS)        1-3.  Stage IV CRC with liver metastasis. moderately differentiated,  proficient MMR.  Guardant 360 was negative for BRAF, VIRI, HER2 amplification.   Pretreatment CEA 57.  We had a long and sonia discussion with him about his diagnosis. Unfortunately, the disease is not curable but remains treatable and he has good performance status.   Restaging scans after 7 cycles of FOLFOX + Pema showed significant reduction in colonic mass and liver mass.   we switched to maintenance as of cycle 8 with 5FU + Pema  Restaging scans from March 2022 show stable disease.   MRI on 7/18/22 showing hepatic progression of disease in the right hepatic lobe noted on the exam. CT CAP on 8/26 shows some mild progression in both liver metastases.    Discussed case at colorectal tumor board 8/31/22 and had a diagnostic lap performed by Dr. Rodriguez on 9/7 to assess for any occult peritoneal disease. Findings from the diagnostic lap were negative. Fluid from around from around the primary mass was sent for cytology that was negative for malignancy.   Underwent primary tumor resection on 10/20/22 with Dr. Bonilla.    Meanwhile his liver mets had grown.  We discussed his case at CRC conference with plans for short term Y-90 and then restarting chemotherapy prior to considering any surgical options to his liver metastases.  He underwent Y-90 delivery on 12/30/22. Tolerated well.   CT CAP on 1/23/23 reviewed at CRC conference with good response to Y-90, no new lesions.  Underwent second Y-90 on 1/27/23. Can consider R hepatectomy pending follow-up imaging after Y-90.     Received cycle 42 of FOLFOX (omitted oxaliplatin again starting cycle 41 due to neuropathy) on 2/9/23.  Because of 5-FU shortage nationally, we have been holding chemotherapy since mid-February 2023.  If he needs to restart chemotherapy (I.e. no plans for surgical resection), will place him on capecitabine maintenance for duration of 5-FU shortage.     Discussed at colorectal liver mets tumor board 3/16/23.  Plan for repeat Y-90 and then  consideration of surgical resection and he will meet with liver transplant team as well.  Underwent Y-90 delivery 5/17/23 with IR.     Has been on maintenance Xeloda since late April 2023.    Met with liver transplant team but ultimately opted not to proceed with transplant as he was concerned about how he would tolerate a major surgery with the life changes that would come after transplant as well. They closed out his case.    He met with Dr. Rodriguez to discuss whether surgical resection is indicated for his liver mets.  He recommended repeat MRI.  Repeat MRI unfortunately showed disease progression while on Xeloda maintenance.  Similarly his CEA garrett precipitously, all in keeping with worsening disease.    We recommended we restart IV chemotherapy with FOLFIRI + Avastin (never had irinotecan).    Because of hyperbilirubinemia he received cycle 1 with just 5-FU + Avastin on 7/11/23. Tolerated this well. Bilirubin has improved.  Labs reviewed. Adequate to proceed with cycle 4 today with FOLFIRI + Avastin.    -RTC in 2 weeks for next cycle. Will repeat imaging studies after 4 cycles.    Tempus: APC, CKS1B cng, ERCC3 cnl, PIK3CA; KENIA, TMB 12.1.    4. Hypertension   -- BP normal today. Feels well.   -- taking Amlodipine .   -- Following with PCP.   -- encouraged him and his daughter to monitor BP and HR closely at home.     5. MARIA A  -- Resolved. Cr normal.   -- Monitor. Encouraged continued hydration particularly with working outside.     6. Anemia  -- Hgb stable.  Monitor.  -- No signs of bleeding. Platelets normal.     7-10. Neoplasm related pain, weight loss, constipation  -- Pain very well controlled.  Has oxycodone 5mg to use PRN.  -- Following with nutrition. Encouraged increased protein. Weight up one pound.  Monitor.  -- Continue Miralax daily for constipation.    11. Urinary hesitancy  -- Continue Flomax.  -- Reports improvement since starting medication.     Patient is in agreement with the proposed treatment  plan. All questions were answered to the patient's satisfaction. Pt knows to call clinic if anything is needed before the next clinic visit.    Bunny Schuster MD  Hematology/Oncology  [facility]      Route Chart for Scheduling    Med Onc Chart Routing      Follow up with physician 4 weeks. as scheduled for FOLFIRI + Avastin   Follow up with RACHEL 2 weeks. as scheduled for FOLFIRI + Avastin   Infusion scheduling note    Injection scheduling note    Labs CBC, CMP, CEA and urinalysis   Scheduling:  Preferred lab:  Lab interval: every 2 weeks     Imaging    Pharmacy appointment    Other referrals

## 2023-08-22 NOTE — PLAN OF CARE
1702  Infusion completed, pt tolerated; CADD pump infusing w/out issue; pt instructed to increase water hydration; discussed possible SE and how to treat; discussed home care of port site, pump operation, troubleshooting and home care; discussed when to contact MD, when to report to ED; pt and daughter verbalized understanding of all discussed and to report for pump d/c on Thurs 8/24/23 at 1500

## 2023-08-24 ENCOUNTER — INFUSION (OUTPATIENT)
Dept: INFUSION THERAPY | Facility: HOSPITAL | Age: 73
End: 2023-08-24
Payer: MEDICARE

## 2023-08-24 VITALS
SYSTOLIC BLOOD PRESSURE: 112 MMHG | RESPIRATION RATE: 18 BRPM | TEMPERATURE: 98 F | DIASTOLIC BLOOD PRESSURE: 64 MMHG | HEART RATE: 81 BPM

## 2023-08-24 DIAGNOSIS — C18.9 METASTATIC COLON CANCER TO LIVER: Primary | ICD-10-CM

## 2023-08-24 DIAGNOSIS — C78.7 METASTATIC COLON CANCER TO LIVER: Primary | ICD-10-CM

## 2023-08-24 PROCEDURE — 25000003 PHARM REV CODE 250: Performed by: INTERNAL MEDICINE

## 2023-08-24 PROCEDURE — 63600175 PHARM REV CODE 636 W HCPCS: Performed by: INTERNAL MEDICINE

## 2023-08-24 PROCEDURE — A4216 STERILE WATER/SALINE, 10 ML: HCPCS | Performed by: INTERNAL MEDICINE

## 2023-08-24 RX ORDER — SODIUM CHLORIDE 0.9 % (FLUSH) 0.9 %
10 SYRINGE (ML) INJECTION
Status: DISCONTINUED | OUTPATIENT
Start: 2023-08-24 | End: 2023-08-24 | Stop reason: HOSPADM

## 2023-08-24 RX ORDER — HEPARIN 100 UNIT/ML
500 SYRINGE INTRAVENOUS
Status: DISCONTINUED | OUTPATIENT
Start: 2023-08-24 | End: 2023-08-24 | Stop reason: HOSPADM

## 2023-08-24 RX ADMIN — HEPARIN 500 UNITS: 100 SYRINGE at 02:08

## 2023-08-24 RX ADMIN — Medication 10 ML: at 02:08

## 2023-08-24 NOTE — PLAN OF CARE
1502-Pt tolerated home infusion well, no complaints or complications reported, VSS, vessel empty upon arrival. Pt disconnected from pump, positive blood return noted. Pt aware to call provider with any questions or concerns, aware of upcoming appts, ambulatory from clinic with steady gait, no distress noted.

## 2023-08-31 LAB
DNA RANGE(S) EXAMINED NAR: NORMAL
GENE DIS ANL INTERP-IMP: POSITIVE
GENE DIS ASSESSED: NORMAL
GENE MUT TESTED BLD/T: 12.1 M/MB
MSI CA SPEC-IMP: NORMAL
REASON FOR STUDY: NORMAL
TEMPUS AMENDMENTNOTE1: NORMAL
TEMPUS FUSIONADDENDUM: NORMAL
TEMPUS PERTINENTNEGATIVES: NORMAL
TEMPUS PORTAL: NORMAL
TEMPUS THERAPY1: NORMAL
TEMPUS THERAPY2: NORMAL
TEMPUS THERAPYCOUNT: 2
TEMPUS TRIAL1: NORMAL
TEMPUS TRIAL2: NORMAL
TEMPUS TRIAL3: NORMAL
TEMPUS TRIALCOUNT: 3

## 2023-09-01 ENCOUNTER — HOSPITAL ENCOUNTER (OUTPATIENT)
Dept: RADIOLOGY | Facility: HOSPITAL | Age: 73
Discharge: HOME | End: 2023-09-01
Attending: INTERNAL MEDICINE
Payer: MEDICARE

## 2023-09-01 DIAGNOSIS — C78.7 METASTATIC COLON CANCER TO LIVER: ICD-10-CM

## 2023-09-01 DIAGNOSIS — C18.9 METASTATIC COLON CANCER TO LIVER: ICD-10-CM

## 2023-09-01 PROCEDURE — 74177 CT CHEST ABDOMEN PELVIS WITH CONTRAST (XPD): ICD-10-PCS | Mod: 26,,, | Performed by: INTERNAL MEDICINE

## 2023-09-01 PROCEDURE — 74177 CT ABD & PELVIS W/CONTRAST: CPT | Mod: 26,,, | Performed by: INTERNAL MEDICINE

## 2023-09-01 PROCEDURE — 25500020 PHARM REV CODE 255: Performed by: INTERNAL MEDICINE

## 2023-09-01 PROCEDURE — 71260 CT CHEST ABDOMEN PELVIS WITH CONTRAST (XPD): ICD-10-PCS | Mod: 26,,, | Performed by: INTERNAL MEDICINE

## 2023-09-01 PROCEDURE — 71260 CT THORAX DX C+: CPT | Mod: 26,,, | Performed by: INTERNAL MEDICINE

## 2023-09-01 PROCEDURE — 71260 CT THORAX DX C+: CPT | Mod: TC

## 2023-09-01 RX ADMIN — IOHEXOL 100 ML: 350 INJECTION, SOLUTION INTRAVENOUS at 11:09

## 2023-09-07 ENCOUNTER — INFUSION (OUTPATIENT)
Dept: INFUSION THERAPY | Facility: HOSPITAL | Age: 73
End: 2023-09-07
Payer: MEDICARE

## 2023-09-07 ENCOUNTER — OFFICE VISIT (OUTPATIENT)
Dept: HEMATOLOGY/ONCOLOGY | Facility: CLINIC | Age: 73
End: 2023-09-07
Payer: MEDICARE

## 2023-09-07 VITALS
SYSTOLIC BLOOD PRESSURE: 124 MMHG | BODY MASS INDEX: 19.78 KG/M2 | OXYGEN SATURATION: 100 % | RESPIRATION RATE: 18 BRPM | TEMPERATURE: 98 F | DIASTOLIC BLOOD PRESSURE: 71 MMHG | WEIGHT: 115.88 LBS | HEIGHT: 64 IN | HEART RATE: 69 BPM

## 2023-09-07 VITALS
TEMPERATURE: 97 F | SYSTOLIC BLOOD PRESSURE: 126 MMHG | RESPIRATION RATE: 18 BRPM | DIASTOLIC BLOOD PRESSURE: 66 MMHG | OXYGEN SATURATION: 100 % | HEART RATE: 60 BPM

## 2023-09-07 DIAGNOSIS — R52 PAIN: ICD-10-CM

## 2023-09-07 DIAGNOSIS — D64.81 ANEMIA ASSOCIATED WITH CHEMOTHERAPY: ICD-10-CM

## 2023-09-07 DIAGNOSIS — E43 SEVERE MALNUTRITION: ICD-10-CM

## 2023-09-07 DIAGNOSIS — C18.9 METASTATIC COLON CANCER TO LIVER: Primary | ICD-10-CM

## 2023-09-07 DIAGNOSIS — C78.7 METASTATIC COLON CANCER TO LIVER: Primary | ICD-10-CM

## 2023-09-07 DIAGNOSIS — K59.03 DRUG-INDUCED CONSTIPATION: ICD-10-CM

## 2023-09-07 DIAGNOSIS — I10 PRIMARY HYPERTENSION: ICD-10-CM

## 2023-09-07 DIAGNOSIS — N17.9 AKI (ACUTE KIDNEY INJURY): ICD-10-CM

## 2023-09-07 DIAGNOSIS — T45.1X5A ANEMIA ASSOCIATED WITH CHEMOTHERAPY: ICD-10-CM

## 2023-09-07 DIAGNOSIS — T45.1X5A IMMUNODEFICIENCY DUE TO CHEMOTHERAPY: ICD-10-CM

## 2023-09-07 DIAGNOSIS — R39.9 LOWER URINARY TRACT SYMPTOMS (LUTS): ICD-10-CM

## 2023-09-07 DIAGNOSIS — Z79.899 IMMUNODEFICIENCY DUE TO CHEMOTHERAPY: ICD-10-CM

## 2023-09-07 DIAGNOSIS — D49.9 IMMUNODEFICIENCY SECONDARY TO NEOPLASM: ICD-10-CM

## 2023-09-07 DIAGNOSIS — R63.4 WEIGHT DECREASE: ICD-10-CM

## 2023-09-07 DIAGNOSIS — D84.81 IMMUNODEFICIENCY SECONDARY TO NEOPLASM: ICD-10-CM

## 2023-09-07 DIAGNOSIS — D84.821 IMMUNODEFICIENCY DUE TO CHEMOTHERAPY: ICD-10-CM

## 2023-09-07 PROCEDURE — 25000003 PHARM REV CODE 250: Performed by: INTERNAL MEDICINE

## 2023-09-07 PROCEDURE — 1101F PT FALLS ASSESS-DOCD LE1/YR: CPT | Mod: CPTII,S$GLB,, | Performed by: INTERNAL MEDICINE

## 2023-09-07 PROCEDURE — 99215 PR OFFICE/OUTPT VISIT, EST, LEVL V, 40-54 MIN: ICD-10-PCS | Mod: S$GLB,,, | Performed by: INTERNAL MEDICINE

## 2023-09-07 PROCEDURE — 3074F SYST BP LT 130 MM HG: CPT | Mod: CPTII,S$GLB,, | Performed by: INTERNAL MEDICINE

## 2023-09-07 PROCEDURE — 3288F PR FALLS RISK ASSESSMENT DOCUMENTED: ICD-10-PCS | Mod: CPTII,S$GLB,, | Performed by: INTERNAL MEDICINE

## 2023-09-07 PROCEDURE — 3008F BODY MASS INDEX DOCD: CPT | Mod: CPTII,S$GLB,, | Performed by: INTERNAL MEDICINE

## 2023-09-07 PROCEDURE — 3078F PR MOST RECENT DIASTOLIC BLOOD PRESSURE < 80 MM HG: ICD-10-PCS | Mod: CPTII,S$GLB,, | Performed by: INTERNAL MEDICINE

## 2023-09-07 PROCEDURE — 96417 CHEMO IV INFUS EACH ADDL SEQ: CPT

## 2023-09-07 PROCEDURE — 3008F PR BODY MASS INDEX (BMI) DOCUMENTED: ICD-10-PCS | Mod: CPTII,S$GLB,, | Performed by: INTERNAL MEDICINE

## 2023-09-07 PROCEDURE — 96367 TX/PROPH/DG ADDL SEQ IV INF: CPT

## 2023-09-07 PROCEDURE — 96416 CHEMO PROLONG INFUSE W/PUMP: CPT

## 2023-09-07 PROCEDURE — 63600175 PHARM REV CODE 636 W HCPCS: Performed by: INTERNAL MEDICINE

## 2023-09-07 PROCEDURE — 1126F PR PAIN SEVERITY QUANTIFIED, NO PAIN PRESENT: ICD-10-PCS | Mod: CPTII,S$GLB,, | Performed by: INTERNAL MEDICINE

## 2023-09-07 PROCEDURE — 96415 CHEMO IV INFUSION ADDL HR: CPT

## 2023-09-07 PROCEDURE — 99999 PR PBB SHADOW E&M-EST. PATIENT-LVL III: CPT | Mod: PBBFAC,,, | Performed by: INTERNAL MEDICINE

## 2023-09-07 PROCEDURE — 3074F PR MOST RECENT SYSTOLIC BLOOD PRESSURE < 130 MM HG: ICD-10-PCS | Mod: CPTII,S$GLB,, | Performed by: INTERNAL MEDICINE

## 2023-09-07 PROCEDURE — 3078F DIAST BP <80 MM HG: CPT | Mod: CPTII,S$GLB,, | Performed by: INTERNAL MEDICINE

## 2023-09-07 PROCEDURE — 1126F AMNT PAIN NOTED NONE PRSNT: CPT | Mod: CPTII,S$GLB,, | Performed by: INTERNAL MEDICINE

## 2023-09-07 PROCEDURE — 1101F PR PT FALLS ASSESS DOC 0-1 FALLS W/OUT INJ PAST YR: ICD-10-PCS | Mod: CPTII,S$GLB,, | Performed by: INTERNAL MEDICINE

## 2023-09-07 PROCEDURE — 96413 CHEMO IV INFUSION 1 HR: CPT

## 2023-09-07 PROCEDURE — 99999 PR PBB SHADOW E&M-EST. PATIENT-LVL III: ICD-10-PCS | Mod: PBBFAC,,, | Performed by: INTERNAL MEDICINE

## 2023-09-07 PROCEDURE — 3288F FALL RISK ASSESSMENT DOCD: CPT | Mod: CPTII,S$GLB,, | Performed by: INTERNAL MEDICINE

## 2023-09-07 PROCEDURE — 99215 OFFICE O/P EST HI 40 MIN: CPT | Mod: S$GLB,,, | Performed by: INTERNAL MEDICINE

## 2023-09-07 RX ORDER — ATROPINE SULFATE 0.4 MG/ML
0.4 INJECTION, SOLUTION ENDOTRACHEAL; INTRAMEDULLARY; INTRAMUSCULAR; INTRAVENOUS; SUBCUTANEOUS ONCE AS NEEDED
Status: CANCELLED | OUTPATIENT
Start: 2023-09-07

## 2023-09-07 RX ORDER — SODIUM CHLORIDE 0.9 % (FLUSH) 0.9 %
10 SYRINGE (ML) INJECTION
Status: CANCELLED | OUTPATIENT
Start: 2023-09-08

## 2023-09-07 RX ORDER — PROCHLORPERAZINE EDISYLATE 5 MG/ML
5 INJECTION INTRAMUSCULAR; INTRAVENOUS ONCE AS NEEDED
Status: DISCONTINUED | OUTPATIENT
Start: 2023-09-07 | End: 2023-09-07 | Stop reason: HOSPADM

## 2023-09-07 RX ORDER — PROCHLORPERAZINE EDISYLATE 5 MG/ML
5 INJECTION INTRAMUSCULAR; INTRAVENOUS ONCE AS NEEDED
Status: CANCELLED
Start: 2023-09-07

## 2023-09-07 RX ORDER — ATROPINE SULFATE 0.4 MG/ML
0.4 INJECTION, SOLUTION ENDOTRACHEAL; INTRAMEDULLARY; INTRAMUSCULAR; INTRAVENOUS; SUBCUTANEOUS ONCE AS NEEDED
Status: COMPLETED | OUTPATIENT
Start: 2023-09-07 | End: 2023-09-07

## 2023-09-07 RX ORDER — DIPHENHYDRAMINE HYDROCHLORIDE 50 MG/ML
50 INJECTION INTRAMUSCULAR; INTRAVENOUS ONCE AS NEEDED
Status: CANCELLED | OUTPATIENT
Start: 2023-09-07

## 2023-09-07 RX ORDER — EPINEPHRINE 0.3 MG/.3ML
0.3 INJECTION SUBCUTANEOUS ONCE AS NEEDED
Status: CANCELLED | OUTPATIENT
Start: 2023-09-07

## 2023-09-07 RX ORDER — HEPARIN 100 UNIT/ML
500 SYRINGE INTRAVENOUS
Status: CANCELLED | OUTPATIENT
Start: 2023-09-07

## 2023-09-07 RX ORDER — HEPARIN 100 UNIT/ML
500 SYRINGE INTRAVENOUS
Status: DISCONTINUED | OUTPATIENT
Start: 2023-09-07 | End: 2023-09-07 | Stop reason: HOSPADM

## 2023-09-07 RX ORDER — SODIUM CHLORIDE 0.9 % (FLUSH) 0.9 %
10 SYRINGE (ML) INJECTION
Status: DISCONTINUED | OUTPATIENT
Start: 2023-09-07 | End: 2023-09-07 | Stop reason: HOSPADM

## 2023-09-07 RX ORDER — SODIUM CHLORIDE 0.9 % (FLUSH) 0.9 %
10 SYRINGE (ML) INJECTION
Status: CANCELLED | OUTPATIENT
Start: 2023-09-07

## 2023-09-07 RX ORDER — DIPHENHYDRAMINE HYDROCHLORIDE 50 MG/ML
50 INJECTION INTRAMUSCULAR; INTRAVENOUS ONCE AS NEEDED
Status: DISCONTINUED | OUTPATIENT
Start: 2023-09-07 | End: 2023-09-07 | Stop reason: HOSPADM

## 2023-09-07 RX ORDER — EPINEPHRINE 0.3 MG/.3ML
0.3 INJECTION SUBCUTANEOUS ONCE AS NEEDED
Status: DISCONTINUED | OUTPATIENT
Start: 2023-09-07 | End: 2023-09-07 | Stop reason: HOSPADM

## 2023-09-07 RX ORDER — HEPARIN 100 UNIT/ML
500 SYRINGE INTRAVENOUS
Status: CANCELLED | OUTPATIENT
Start: 2023-09-08

## 2023-09-07 RX ADMIN — FLUOROURACIL 3840 MG: 50 INJECTION, SOLUTION INTRAVENOUS at 05:09

## 2023-09-07 RX ADMIN — SODIUM CHLORIDE: 9 INJECTION, SOLUTION INTRAVENOUS at 01:09

## 2023-09-07 RX ADMIN — DEXAMETHASONE SODIUM PHOSPHATE 0.25 MG: 4 INJECTION, SOLUTION INTRA-ARTICULAR; INTRALESIONAL; INTRAMUSCULAR; INTRAVENOUS; SOFT TISSUE at 03:09

## 2023-09-07 RX ADMIN — BEVACIZUMAB-AWWB 270 MG: 100 INJECTION, SOLUTION INTRAVENOUS at 02:09

## 2023-09-07 RX ADMIN — ATROPINE SULFATE 0.4 MG: 0.4 INJECTION, SOLUTION INTRAVENOUS at 03:09

## 2023-09-07 RX ADMIN — SODIUM CHLORIDE 280 MG: 9 INJECTION, SOLUTION INTRAVENOUS at 03:09

## 2023-09-07 NOTE — PLAN OF CARE
3512 Patient here for Mvasi and folfiri. Labs, meds and hx reviewed. Patient seen by MD today and orders signed for treatment today. Port accessed and NS started at 25ml/hr. [place] and snack provided.

## 2023-09-07 NOTE — PLAN OF CARE
1745 Patient tolerated Mvasi and foliri with no complaints. 5FU pump applied and secured and running at 2.4ml/hr. Instructed to return on Saturday at 1300 for pump d/c. Patient understands and aware to call MD office for any concerns. Ambulated out.

## 2023-09-07 NOTE — PROGRESS NOTES
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 Bonilla  12/30/2022: Y-90 to R liver  1/27/2023: Y-90 to R liver  5/17/2023: Y-90 to R liver  7/11/2023: C1 FOLFIRI + Pema  7/26/2023: C2 FOLFIRI + Pema  8/8/2023: C3 FOLFIRI + Pema  8/22/23: C4 FOLFIRI + Pema  Restaging CT CAP 9/1/23: Good response to chemo.  9/7/23: C5 FOLFIRI + Pema      Interval History:   He presents today with his daughter prior to cycle 5 of FOLFIRI plus avastin. Had a mildly pruritic rash on the back of his neck that has resolved.  No N/V or diarrhea.  Denies pain.  Remains fairly active. No other concerns.     ECOG status is 1.     Past Medical History:   Past Medical History:   Diagnosis Date    MARIA A (acute kidney injury) 8/18/2022    Hypertension     Metastatic colon cancer to liver 4/22/2021     Past Surgical HIstory:   Past Surgical History:   Procedure Laterality Date    COLONOSCOPY N/A 4/20/2021    Procedure: COLONOSCOPY with possible stent;  Surgeon: EDILMA Bonilla MD;  Location: [facility] ([address] [place] [address]);  Service: Colon and Rectal;  Laterality: N/A;    DIAGNOSTIC LAPAROSCOPY N/A 9/7/2022    Procedure: LAPAROSCOPY, DIAGNOSTIC;  Surgeon: Adrian Rodriguez MD;  Location: [facility] OR [address] [place] [address];  Service: General;  Laterality: N/A;    INSERTION OF TUNNELED CENTRAL VENOUS CATHETER (CVC) WITH  SUBCUTANEOUS PORT N/A 5/3/2021    Procedure: [id number];  Surgeon: Francisco Layton MD;  Location: [facility] OR 2ND FLR;  Service: Vascular;  Laterality: N/A;    OMENTECTOMY N/A 10/20/2022    Procedure: OMENTECTOMY;  Surgeon: EDILMA Bonilla MD;  Location: [facility] OR 2ND FLR;  Service: Colon and Rectal;  Laterality: N/A;    RIGHT HEMICOLECTOMY N/A 10/20/2022    Procedure: HEMICOLECTOMY, RIGHT, extended;  Surgeon: EDILMA Bonilla MD;  Location: [facility] OR 2ND FLR;  Service: Colon and Rectal;  Laterality: N/A;  Extended right hemicolectomy CONSENT IN AM     Family History: History reviewed. No pertinent family history.    Social History:  reports that he has never smoked. He has never used smokeless tobacco. He reports that he does not currently use alcohol. He reports that he does not use drugs.    Allergies:  Review of patient's allergies indicates:  No Known Allergies    Medications:  Current Outpatient Medications   Medication Sig Dispense Refill    acetaminophen (TYLENOL) 500 MG tablet Take 1 tablet (500 mg total) by mouth every 6 (six) hours as needed for Pain (alternate with ibuprofen). (Patient not taking: Reported on 6/28/2023)  0    amLODIPine (NORVASC) 10 MG tablet Take 1 tablet (10 mg total) by mouth once daily. 90 tablet 3    capecitabine (XELODA) 500 MG Tab Take 3 tablets (1,500 mg) by mouth twice daily with food on days 1 thru 7 of 14 day cycles. (Patient not taking: Reported on 6/28/2023.) 84 tablet 5    ibuprofen (ADVIL,MOTRIN) 400 MG tablet Take 1 tablet (400 mg total) by mouth every 6 (six) hours as needed for Other (pain). Alternate with tylenol (Patient not taking: Reported on 6/28/2023)      ondansetron (ZOFRAN) 4 MG tablet Take 1 tablet (4 mg total) by mouth every 8 (eight) hours as needed for Nausea. (Patient not taking: Reported on 6/28/2023) 30 tablet 3    oxyCODONE (ROXICODONE) 5 MG immediate release tablet Take 1 tablet (5 mg total) by mouth every 6 (six) hours as needed for Pain. (Patient  not taking: Reported on 6/28/2023) 20 tablet 0    tamsulosin (FLOMAX) 0.4 mg Cap Take 1 capsule (0.4 mg total) by mouth once daily. (Patient not taking: Reported on 6/28/2023) 30 capsule 5     No current facility-administered medications for this visit.     Facility-Administered Medications Ordered in Other Visits   Medication Dose Route Frequency Provider Last Rate Last Admin    alteplase injection 2 mg  2 mg Intra-Catheter PRN Bunny Schuster MD        atropine injection 0.4 mg  0.4 mg Intravenous Once PRN Bunny Schuster MD        bevacizumab-awwb (MVASI) 5 mg/kg = 270 mg in sodium chloride 0.9% 100 mL infusion  5 mg/kg (Treatment Plan Recorded) Intravenous 1 time in Clinic/Bunny Penny MD        diphenhydrAMINE injection 50 mg  50 mg Intravenous Once PRN Bunny Schuster MD        EPINEPHrine (EPIPEN) 0.3 mg/0.3 mL pen injection 0.3 mg  0.3 mg Intramuscular Once PRN Bunny Schuster MD        fluorouracil (ADRUCIL) 2,400 mg/m2 = 3,840 mg in sodium chloride 0.9% 100 mL chemo infusion  2,400 mg/m2 (Treatment Plan Recorded) Intravenous over 46 hr Bunny Schuster MD        heparin, porcine (PF) 100 unit/mL injection flush 500 Units  500 Units Intravenous PRN Bunny Schuster MD        hydrocortisone sodium succinate injection 100 mg  100 mg Intravenous Once PRN Bunny Schuster MD        irinotecan (CAMPTOSAR) 180 mg/m2 = 288 mg in sodium chloride 0.9% 514.4 mL chemo infusion  180 mg/m2 (Treatment Plan Recorded) Intravenous 1 time in Clinic/Bunny Penny MD        palonosetron 0.25mg/dexAMETHasone 12mg in NS IVPB 0.25 mg 50 mL  0.25 mg Intravenous 1 time in Clinic/Bunny Penny MD        prochlorperazine injection Soln 5 mg  5 mg Intravenous Once PRN Bunny Schuster MD        sodium chloride 0.9% 250 mL flush bag   Intravenous 1 time in Clinic/HOD Bunny Schuster MD        sodium chloride 0.9% flush 10 mL  10 mL Intravenous PRN  "Bunny Schuster MD         Review of Systems   Constitutional:  Positive for fatigue. Negative for activity change, appetite change, chills, diaphoresis, fever and unexpected weight change.   HENT:  Negative for congestion, mouth sores, nosebleeds, postnasal drip, rhinorrhea, trouble swallowing and voice change.    Eyes:  Negative for pain and visual disturbance.   Respiratory:  Negative for cough, chest tightness, shortness of breath and wheezing.    Cardiovascular:  Negative for chest pain, palpitations and leg swelling.   Gastrointestinal:  Negative for abdominal distention, abdominal pain, blood in stool, constipation, diarrhea, nausea and vomiting.   Genitourinary:  Negative for difficulty urinating, dysuria, flank pain, frequency, hematuria and urgency.   Musculoskeletal:  Negative for arthralgias, back pain and myalgias.   Skin:  Negative for rash and wound.   Neurological:  Positive for numbness. Negative for dizziness, facial asymmetry, weakness, light-headedness and headaches.   Psychiatric/Behavioral:  Negative for agitation, behavioral problems, confusion, decreased concentration, dysphoric mood and sleep disturbance. The patient is not nervous/anxious.    All other systems reviewed and are negative.    ECOG Performance Status: 1     Objective:      Vitals:   Vitals:    09/07/23 1307   BP: 124/71   BP Location: Left arm   Patient Position: Sitting   BP Method: Medium (Automatic)   Pulse: 69   Resp: 18   Temp: 97.5 °F (36.4 °C)   TempSrc: Oral   SpO2: 100%   Weight: 52.6 kg (115 lb 13.6 oz)   Height: 5' 4" (1.626 m)     Physical Exam  Vitals reviewed.   Constitutional:       General: He is not in acute distress.     Appearance: Normal appearance. He is not ill-appearing, toxic-appearing or diaphoretic.   HENT:      Head: Normocephalic and atraumatic.      Right Ear: External ear normal.      Left Ear: External ear normal.   Eyes:      General: No scleral icterus.     Extraocular Movements: " Extraocular movements intact.      Conjunctiva/sclera: Conjunctivae normal.      Pupils: Pupils are equal, round, and reactive to light.   Cardiovascular:      Rate and Rhythm: Normal rate and regular rhythm.      Pulses: Normal pulses.      Heart sounds: Normal heart sounds. No murmur heard.  Pulmonary:      Effort: Pulmonary effort is normal. No respiratory distress.      Breath sounds: Normal breath sounds. No wheezing.   Chest:      Comments: Port to RCW, no signs of infection.  Abdominal:      General: Abdomen is flat. Bowel sounds are normal. There is no distension.      Palpations: Abdomen is soft. There is no mass.      Tenderness: There is no abdominal tenderness.      Comments: Vertical midline abdominal wound well healed   Musculoskeletal:         General: No swelling or deformity. Normal range of motion.      Right lower leg: No edema.      Left lower leg: No edema.   Skin:     Coloration: Skin is not jaundiced or pale.      Findings: No bruising, erythema or rash.   Neurological:      General: No focal deficit present.      Mental Status: He is alert and oriented to person, place, and time. Mental status is at baseline.      Cranial Nerves: No cranial nerve deficit.      Sensory: No sensory deficit.      Gait: Gait normal.   Psychiatric:         Mood and Affect: Mood normal.         Behavior: Behavior normal.         Thought Content: Thought content normal.         Judgment: Judgment normal.     Laboratory Data:  Lab Visit on 09/07/2023   Component Date Value Ref Range Status    WBC 09/07/2023 3.57 (L)  3.90 - 12.70 K/uL Final    RBC 09/07/2023 4.06 (L)  4.60 - 6.20 M/uL Final    Hemoglobin 09/07/2023 10.4 (L)  14.0 - 18.0 g/dL Final    Hematocrit 09/07/2023 35.4 (L)  40.0 - 54.0 % Final    MCV 09/07/2023 87  82 - 98 fL Final    MCH 09/07/2023 25.6 (L)  27.0 - 31.0 pg Final    MCHC 09/07/2023 29.4 (L)  32.0 - 36.0 g/dL Final    RDW 09/07/2023 19.7 (H)  11.5 - 14.5 % Final    Platelets 09/07/2023 231   150 - 450 K/uL Final    MPV 09/07/2023 10.4  9.2 - 12.9 fL Final    Immature Granulocytes 09/07/2023 0.3  0.0 - 0.5 % Final    Gran # (ANC) 09/07/2023 1.5 (L)  1.8 - 7.7 K/uL Final    Immature Grans (Abs) 09/07/2023 0.01  0.00 - 0.04 K/uL Final    Comment: Mild elevation in immature granulocytes is non specific and   can be seen in a variety of conditions including stress response,   acute inflammation, trauma and pregnancy. Correlation with other   laboratory and clinical findings is essential.      Lymph # 09/07/2023 1.0  1.0 - 4.8 K/uL Final    Mono # 09/07/2023 0.6  0.3 - 1.0 K/uL Final    Eos # 09/07/2023 0.4  0.0 - 0.5 K/uL Final    Baso # 09/07/2023 0.03  0.00 - 0.20 K/uL Final    nRBC 09/07/2023 0  0 /100 WBC Final    Gran % 09/07/2023 43.1  38.0 - 73.0 % Final    Lymph % 09/07/2023 26.9  18.0 - 48.0 % Final    Mono % 09/07/2023 17.1 (H)  4.0 - 15.0 % Final    Eosinophil % 09/07/2023 11.8 (H)  0.0 - 8.0 % Final    Basophil % 09/07/2023 0.8  0.0 - 1.9 % Final    Differential Method 09/07/2023 Automated   Final    Sodium 09/07/2023 144  136 - 145 mmol/L Final    Potassium 09/07/2023 4.3  3.5 - 5.1 mmol/L Final    Chloride 09/07/2023 110  95 - 110 mmol/L Final    CO2 09/07/2023 27  23 - 29 mmol/L Final    Glucose 09/07/2023 107  70 - 110 mg/dL Final    BUN 09/07/2023 9  8 - 23 mg/dL Final    Creatinine 09/07/2023 0.9  0.5 - 1.4 mg/dL Final    Calcium 09/07/2023 9.4  8.7 - 10.5 mg/dL Final    Total Protein 09/07/2023 7.2  6.0 - 8.4 g/dL Final    Albumin 09/07/2023 3.1 (L)  3.5 - 5.2 g/dL Final    Total Bilirubin 09/07/2023 0.8  0.1 - 1.0 mg/dL Final    Comment: For infants and newborns, interpretation of results should be based  on gestational age, weight and in agreement with clinical  observations.    Premature Infant recommended reference ranges:  Up to 24 hours.............<8.0 mg/dL  Up to 48 hours............<12.0 mg/dL  3-5 days..................<15.0 mg/dL  6-29 days.................<15.0 mg/dL       Alkaline Phosphatase 09/07/2023 179 (H)  55 - 135 U/L Final    AST 09/07/2023 30  10 - 40 U/L Final    ALT 09/07/2023 15  10 - 44 U/L Final    eGFR 09/07/2023 >60.0  >60 mL/min/1.73 m^2 Final    Anion Gap 09/07/2023 7 (L)  8 - 16 mmol/L Final    CEA 09/07/2023 21.9 (H)  0.0 - 5.0 ng/mL Final    Comment: CEA Normal Range:  Non-Smokers: 0-3.0 ng/mL  Smokers:     0-5.0 ng/mL    The testing method is a chemiluminescent microparticle immunoassay   manufactured by [employer] and performed on the [employer]   or   [employer] system. Values obtained with different assay manufacturers   for   methods may be different and cannot be used interchangeably.       Assessment and Plan        1. 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 Immunodeficiency secondary to neoplasm    4. Primary hypertension    5. MARIA A (acute kidney injury)    6. Anemia associated with chemotherapy    7. Pain    8. Drug-induced constipation    9. Weight decrease    10. Severe malnutrition    11. Lower urinary tract symptoms (LUTS)          1-3.  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  Guardant 360 was negative for BRAF, VIRI, HER2 amplification.   Pretreatment CEA 57.  We had a long and sonia discussion with him about his diagnosis. Unfortunately, the disease is not curable but remains treatable and he has good performance status.   Restaging scans after 7 cycles of FOLFOX + Pema showed significant reduction in colonic mass and liver mass.   we switched to maintenance as of cycle 8 with 5FU + Pema  Restaging scans from March 2022 show stable disease.   MRI on 7/18/22 showing hepatic progression of disease in the right hepatic lobe noted on the exam. CT CAP on 8/26 shows some mild progression in both liver metastases.    Discussed case at colorectal tumor board 8/31/22 and had a diagnostic lap performed by Dr. Rodriguez on 9/7 to assess for any occult peritoneal disease.  Findings from the diagnostic lap were negative. Fluid from around from around the primary mass was sent for cytology that was negative for malignancy.   Underwent primary tumor resection on 10/20/22 with Dr. Bonilla.    Meanwhile his liver mets had grown.  We discussed his case at [facility] conference with plans for short term Y-90 and then restarting chemotherapy prior to considering any surgical options to his liver metastases.  He underwent Y-90 delivery on 12/30/22. Tolerated well.   CT CAP on 1/23/23 reviewed at [facility] conference with good response to Y-90, no new lesions.  Underwent second Y-90 on 1/27/23. Can consider R hepatectomy pending follow-up imaging after Y-90.     Received cycle 42 of FOLFOX (omitted oxaliplatin again starting cycle 41 due to neuropathy) on 2/9/23.  Because of 5-FU shortage nationally, we have been holding chemotherapy since mid-February 2023.  If he needs to restart chemotherapy (I.e. no plans for surgical resection), will place him on capecitabine maintenance for duration of 5-FU shortage.     Discussed at colorectal liver mets tumor board 3/16/23.  Plan for repeat Y-90 and then consideration of surgical resection and he will meet with liver transplant team as well.  Underwent Y-90 delivery 5/17/23 with IR.     Has been on maintenance Xeloda since late April 2023.    Met with liver transplant team but ultimately opted not to proceed with transplant as he was concerned about how he would tolerate a major surgery with the life changes that would come after transplant as well. They closed out his case.    He met with Dr. Rodriguez to discuss whether surgical resection is indicated for his liver mets.  He recommended repeat MRI.  Repeat MRI unfortunately showed disease progression while on Xeloda maintenance.  Similarly his CEA garrett precipitously, all in keeping with worsening disease.    We recommended we restart IV chemotherapy with FOLFIRI + Avastin (never had irinotecan).    Because of  hyperbilirubinemia he received cycle 1 with just 5-FU + Avastin on 7/11/23. Tolerated this well. Bilirubin has improved.  Received cycle 2-4 with irinotecan.  Repeat CT CAP after cycle 4 shows good response to therapy.   Excellent tolerance.  Labs acceptable to proceed with cycle 5 today.    -RTC in 2 weeks for next cycle. Will repeat imaging studies after cycle 8.    Tempus: APC, CKS1B cng, ERCC3 cnl, PIK3CA; KENIA, TMB 12.1.    4. Hypertension   -- BP normal today. Feels well.   -- taking Amlodipine .   -- Following with PCP.   -- encouraged him and his daughter to monitor BP and HR closely at home.     5. MARIA A  -- Resolved. Cr normal.   -- Monitor. Encouraged continued hydration particularly with working outside.     6. Anemia  -- Hgb stable.  Monitor.  -- No signs of bleeding. Platelets normal.     7-10. Neoplasm related pain, weight loss, constipation  -- Pain very well controlled.  Has oxycodone 5mg to use PRN.  -- Following with nutrition. Encouraged increased protein. Weight up one pound.  Monitor.  -- Continue Miralax daily for constipation.    11. Urinary hesitancy  -- Continue Flomax.  -- Reports improvement since starting medication.     Patient is in agreement with the proposed treatment plan. All questions were answered to the patient's satisfaction. Pt knows to call clinic if anything is needed before the next clinic visit.    Bunny Schuster MD  Hematology/Oncology  [facility]      Route Chart for Scheduling  Med Onc Route Chart for Scheduling

## 2023-09-09 ENCOUNTER — INFUSION (OUTPATIENT)
Dept: INFUSION THERAPY | Facility: HOSPITAL | Age: 73
End: 2023-09-09
Payer: MEDICARE

## 2023-09-09 VITALS
OXYGEN SATURATION: 100 % | SYSTOLIC BLOOD PRESSURE: 111 MMHG | RESPIRATION RATE: 17 BRPM | HEART RATE: 86 BPM | DIASTOLIC BLOOD PRESSURE: 66 MMHG

## 2023-09-09 DIAGNOSIS — C78.7 METASTATIC COLON CANCER TO LIVER: Primary | ICD-10-CM

## 2023-09-09 DIAGNOSIS — C18.9 METASTATIC COLON CANCER TO LIVER: Primary | ICD-10-CM

## 2023-09-09 PROCEDURE — A4216 STERILE WATER/SALINE, 10 ML: HCPCS | Performed by: INTERNAL MEDICINE

## 2023-09-09 PROCEDURE — 25000003 PHARM REV CODE 250: Performed by: INTERNAL MEDICINE

## 2023-09-09 PROCEDURE — 63600175 PHARM REV CODE 636 W HCPCS: Performed by: INTERNAL MEDICINE

## 2023-09-09 RX ORDER — HEPARIN 100 UNIT/ML
500 SYRINGE INTRAVENOUS
Status: DISCONTINUED | OUTPATIENT
Start: 2023-09-09 | End: 2023-09-09 | Stop reason: HOSPADM

## 2023-09-09 RX ORDER — SODIUM CHLORIDE 0.9 % (FLUSH) 0.9 %
10 SYRINGE (ML) INJECTION
Status: DISCONTINUED | OUTPATIENT
Start: 2023-09-09 | End: 2023-09-09 | Stop reason: HOSPADM

## 2023-09-09 RX ADMIN — HEPARIN 500 UNITS: 100 SYRINGE at 12:09

## 2023-09-09 RX ADMIN — Medication 10 ML: at 12:09

## 2023-09-09 NOTE — NURSING
Pt tolerated pump without complications. Pump d/c, port a cath flushed without difficulty. Pt d/c from unit ambulatory and in table condition.

## 2023-09-11 ENCOUNTER — DOCUMENTATION ONLY (OUTPATIENT)
Dept: HEMATOLOGY/ONCOLOGY | Facility: CLINIC | Age: 73
End: 2023-09-11
Payer: MEDICARE

## 2023-09-11 NOTE — PROGRESS NOTES
[facility]     Colorectal Liver Metastasis Tumor Board Note      Date: 09/7/2023    Case Overview: Mr. 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 He has received FOLFOX + dwight and Y-90. In 9/2022, he was restarted on FOLFOX. Underwent primary tumor resection on 10/20/22 as well as Y-90 x 3 on 12/30/22, 1/27/23, and 5/17/23. Started FOLFIRI + dwight in 07/2023 s/p 4 cycles.     Participants: medical oncology, surgical oncology, colorectal surgery, transplant surgeons, interventional radiology, and oncology navigator     Imaging Reviewed: 09/2023 CT CAP    Radiology Review: Total burden of disease in the liver appears slightly improved compared to MRI 6/19/23.  No definite new disease.    Orders/Referrals: none.     Board Recommendations: Patient previously opted against transplant. Given good response on current regimen, will plan to continue chemo indefinitely. No locoregional options recommended at this time.

## 2023-09-18 DIAGNOSIS — C18.9 METASTATIC COLON CANCER TO LIVER: Primary | ICD-10-CM

## 2023-09-18 DIAGNOSIS — C78.7 METASTATIC COLON CANCER TO LIVER: Primary | ICD-10-CM

## 2023-09-19 ENCOUNTER — OFFICE VISIT (OUTPATIENT)
Dept: HEMATOLOGY/ONCOLOGY | Facility: CLINIC | Age: 73
End: 2023-09-19
Payer: MEDICARE

## 2023-09-19 ENCOUNTER — INFUSION (OUTPATIENT)
Dept: INFUSION THERAPY | Facility: HOSPITAL | Age: 73
End: 2023-09-19
Payer: MEDICARE

## 2023-09-19 VITALS
OXYGEN SATURATION: 100 % | RESPIRATION RATE: 18 BRPM | WEIGHT: 115.31 LBS | HEART RATE: 78 BPM | DIASTOLIC BLOOD PRESSURE: 60 MMHG | HEIGHT: 64 IN | BODY MASS INDEX: 19.68 KG/M2 | SYSTOLIC BLOOD PRESSURE: 103 MMHG

## 2023-09-19 VITALS
WEIGHT: 115.31 LBS | TEMPERATURE: 98 F | SYSTOLIC BLOOD PRESSURE: 104 MMHG | HEART RATE: 61 BPM | HEIGHT: 64 IN | DIASTOLIC BLOOD PRESSURE: 62 MMHG | RESPIRATION RATE: 18 BRPM | BODY MASS INDEX: 19.68 KG/M2

## 2023-09-19 DIAGNOSIS — T45.1X5A ANEMIA ASSOCIATED WITH CHEMOTHERAPY: ICD-10-CM

## 2023-09-19 DIAGNOSIS — R52 PAIN: ICD-10-CM

## 2023-09-19 DIAGNOSIS — D64.81 ANEMIA ASSOCIATED WITH CHEMOTHERAPY: ICD-10-CM

## 2023-09-19 DIAGNOSIS — C78.7 METASTATIC COLON CANCER TO LIVER: Primary | ICD-10-CM

## 2023-09-19 DIAGNOSIS — E87.6 HYPOKALEMIA: ICD-10-CM

## 2023-09-19 DIAGNOSIS — N17.9 AKI (ACUTE KIDNEY INJURY): ICD-10-CM

## 2023-09-19 DIAGNOSIS — I10 PRIMARY HYPERTENSION: ICD-10-CM

## 2023-09-19 DIAGNOSIS — R63.4 WEIGHT DECREASE: ICD-10-CM

## 2023-09-19 DIAGNOSIS — D49.9 IMMUNODEFICIENCY SECONDARY TO NEOPLASM: ICD-10-CM

## 2023-09-19 DIAGNOSIS — T45.1X5A IMMUNODEFICIENCY DUE TO CHEMOTHERAPY: ICD-10-CM

## 2023-09-19 DIAGNOSIS — K59.03 DRUG-INDUCED CONSTIPATION: ICD-10-CM

## 2023-09-19 DIAGNOSIS — D84.821 IMMUNODEFICIENCY DUE TO CHEMOTHERAPY: ICD-10-CM

## 2023-09-19 DIAGNOSIS — E43 SEVERE MALNUTRITION: ICD-10-CM

## 2023-09-19 DIAGNOSIS — D84.81 IMMUNODEFICIENCY SECONDARY TO NEOPLASM: ICD-10-CM

## 2023-09-19 DIAGNOSIS — C18.9 METASTATIC COLON CANCER TO LIVER: Primary | ICD-10-CM

## 2023-09-19 DIAGNOSIS — D70.1 CHEMOTHERAPY INDUCED NEUTROPENIA: ICD-10-CM

## 2023-09-19 DIAGNOSIS — Z79.899 IMMUNODEFICIENCY DUE TO CHEMOTHERAPY: ICD-10-CM

## 2023-09-19 DIAGNOSIS — R39.9 LOWER URINARY TRACT SYMPTOMS (LUTS): ICD-10-CM

## 2023-09-19 DIAGNOSIS — T45.1X5A CHEMOTHERAPY INDUCED NEUTROPENIA: ICD-10-CM

## 2023-09-19 PROCEDURE — 3074F PR MOST RECENT SYSTOLIC BLOOD PRESSURE < 130 MM HG: ICD-10-PCS | Mod: CPTII,S$GLB,, | Performed by: REGISTERED NURSE

## 2023-09-19 PROCEDURE — 1160F RVW MEDS BY RX/DR IN RCRD: CPT | Mod: CPTII,S$GLB,, | Performed by: REGISTERED NURSE

## 2023-09-19 PROCEDURE — 96413 CHEMO IV INFUSION 1 HR: CPT

## 2023-09-19 PROCEDURE — 25000003 PHARM REV CODE 250: Performed by: REGISTERED NURSE

## 2023-09-19 PROCEDURE — 1101F PT FALLS ASSESS-DOCD LE1/YR: CPT | Mod: CPTII,S$GLB,, | Performed by: REGISTERED NURSE

## 2023-09-19 PROCEDURE — 96415 CHEMO IV INFUSION ADDL HR: CPT

## 2023-09-19 PROCEDURE — 96417 CHEMO IV INFUS EACH ADDL SEQ: CPT

## 2023-09-19 PROCEDURE — 3078F PR MOST RECENT DIASTOLIC BLOOD PRESSURE < 80 MM HG: ICD-10-PCS | Mod: CPTII,S$GLB,, | Performed by: REGISTERED NURSE

## 2023-09-19 PROCEDURE — 3288F PR FALLS RISK ASSESSMENT DOCUMENTED: ICD-10-PCS | Mod: CPTII,S$GLB,, | Performed by: REGISTERED NURSE

## 2023-09-19 PROCEDURE — 3074F SYST BP LT 130 MM HG: CPT | Mod: CPTII,S$GLB,, | Performed by: REGISTERED NURSE

## 2023-09-19 PROCEDURE — 1126F PR PAIN SEVERITY QUANTIFIED, NO PAIN PRESENT: ICD-10-PCS | Mod: CPTII,S$GLB,, | Performed by: REGISTERED NURSE

## 2023-09-19 PROCEDURE — 99215 PR OFFICE/OUTPT VISIT, EST, LEVL V, 40-54 MIN: ICD-10-PCS | Mod: S$GLB,,, | Performed by: REGISTERED NURSE

## 2023-09-19 PROCEDURE — 96416 CHEMO PROLONG INFUSE W/PUMP: CPT

## 2023-09-19 PROCEDURE — 63600175 PHARM REV CODE 636 W HCPCS: Performed by: REGISTERED NURSE

## 2023-09-19 PROCEDURE — 1159F PR MEDICATION LIST DOCUMENTED IN MEDICAL RECORD: ICD-10-PCS | Mod: CPTII,S$GLB,, | Performed by: REGISTERED NURSE

## 2023-09-19 PROCEDURE — 1126F AMNT PAIN NOTED NONE PRSNT: CPT | Mod: CPTII,S$GLB,, | Performed by: REGISTERED NURSE

## 2023-09-19 PROCEDURE — 96375 TX/PRO/DX INJ NEW DRUG ADDON: CPT

## 2023-09-19 PROCEDURE — 1160F PR REVIEW ALL MEDS BY PRESCRIBER/CLIN PHARMACIST DOCUMENTED: ICD-10-PCS | Mod: CPTII,S$GLB,, | Performed by: REGISTERED NURSE

## 2023-09-19 PROCEDURE — 3008F PR BODY MASS INDEX (BMI) DOCUMENTED: ICD-10-PCS | Mod: CPTII,S$GLB,, | Performed by: REGISTERED NURSE

## 2023-09-19 PROCEDURE — 99215 OFFICE O/P EST HI 40 MIN: CPT | Mod: S$GLB,,, | Performed by: REGISTERED NURSE

## 2023-09-19 PROCEDURE — 1101F PR PT FALLS ASSESS DOC 0-1 FALLS W/OUT INJ PAST YR: ICD-10-PCS | Mod: CPTII,S$GLB,, | Performed by: REGISTERED NURSE

## 2023-09-19 PROCEDURE — 1159F MED LIST DOCD IN RCRD: CPT | Mod: CPTII,S$GLB,, | Performed by: REGISTERED NURSE

## 2023-09-19 PROCEDURE — 3288F FALL RISK ASSESSMENT DOCD: CPT | Mod: CPTII,S$GLB,, | Performed by: REGISTERED NURSE

## 2023-09-19 PROCEDURE — 99999 PR PBB SHADOW E&M-EST. PATIENT-LVL III: ICD-10-PCS | Mod: PBBFAC,,, | Performed by: REGISTERED NURSE

## 2023-09-19 PROCEDURE — 3008F BODY MASS INDEX DOCD: CPT | Mod: CPTII,S$GLB,, | Performed by: REGISTERED NURSE

## 2023-09-19 PROCEDURE — 96367 TX/PROPH/DG ADDL SEQ IV INF: CPT

## 2023-09-19 PROCEDURE — 99999 PR PBB SHADOW E&M-EST. PATIENT-LVL III: CPT | Mod: PBBFAC,,, | Performed by: REGISTERED NURSE

## 2023-09-19 PROCEDURE — 3078F DIAST BP <80 MM HG: CPT | Mod: CPTII,S$GLB,, | Performed by: REGISTERED NURSE

## 2023-09-19 RX ORDER — EPINEPHRINE 0.3 MG/.3ML
0.3 INJECTION SUBCUTANEOUS ONCE AS NEEDED
Status: DISCONTINUED | OUTPATIENT
Start: 2023-09-19 | End: 2023-09-19 | Stop reason: HOSPADM

## 2023-09-19 RX ORDER — POTASSIUM CHLORIDE 20 MEQ/1
40 TABLET, EXTENDED RELEASE ORAL ONCE
Status: CANCELLED
Start: 2023-09-20

## 2023-09-19 RX ORDER — ATROPINE SULFATE 0.4 MG/ML
0.4 INJECTION, SOLUTION ENDOTRACHEAL; INTRAMEDULLARY; INTRAMUSCULAR; INTRAVENOUS; SUBCUTANEOUS ONCE AS NEEDED
Status: CANCELLED | OUTPATIENT
Start: 2023-09-20

## 2023-09-19 RX ORDER — ATROPINE SULFATE 0.4 MG/ML
0.4 INJECTION, SOLUTION ENDOTRACHEAL; INTRAMEDULLARY; INTRAMUSCULAR; INTRAVENOUS; SUBCUTANEOUS ONCE AS NEEDED
Status: COMPLETED | OUTPATIENT
Start: 2023-09-19 | End: 2023-09-19

## 2023-09-19 RX ORDER — SODIUM CHLORIDE 0.9 % (FLUSH) 0.9 %
10 SYRINGE (ML) INJECTION
Status: CANCELLED | OUTPATIENT
Start: 2023-09-20

## 2023-09-19 RX ORDER — SODIUM CHLORIDE 0.9 % (FLUSH) 0.9 %
10 SYRINGE (ML) INJECTION
Status: CANCELLED | OUTPATIENT
Start: 2023-09-22

## 2023-09-19 RX ORDER — HEPARIN 100 UNIT/ML
500 SYRINGE INTRAVENOUS
Status: CANCELLED | OUTPATIENT
Start: 2023-09-20

## 2023-09-19 RX ORDER — POTASSIUM CHLORIDE 20 MEQ/1
40 TABLET, EXTENDED RELEASE ORAL ONCE
Status: COMPLETED | OUTPATIENT
Start: 2023-09-19 | End: 2023-09-19

## 2023-09-19 RX ORDER — HEPARIN 100 UNIT/ML
500 SYRINGE INTRAVENOUS
Status: CANCELLED | OUTPATIENT
Start: 2023-09-22

## 2023-09-19 RX ORDER — EPINEPHRINE 0.3 MG/.3ML
0.3 INJECTION SUBCUTANEOUS ONCE AS NEEDED
Status: CANCELLED | OUTPATIENT
Start: 2023-09-20

## 2023-09-19 RX ORDER — DIPHENHYDRAMINE HYDROCHLORIDE 50 MG/ML
50 INJECTION INTRAMUSCULAR; INTRAVENOUS ONCE AS NEEDED
Status: DISCONTINUED | OUTPATIENT
Start: 2023-09-19 | End: 2023-09-19 | Stop reason: HOSPADM

## 2023-09-19 RX ORDER — DIPHENHYDRAMINE HYDROCHLORIDE 50 MG/ML
50 INJECTION INTRAMUSCULAR; INTRAVENOUS ONCE AS NEEDED
Status: CANCELLED | OUTPATIENT
Start: 2023-09-20

## 2023-09-19 RX ORDER — SODIUM CHLORIDE 0.9 % (FLUSH) 0.9 %
10 SYRINGE (ML) INJECTION
Status: DISCONTINUED | OUTPATIENT
Start: 2023-09-19 | End: 2023-09-19 | Stop reason: HOSPADM

## 2023-09-19 RX ORDER — PROCHLORPERAZINE EDISYLATE 5 MG/ML
5 INJECTION INTRAMUSCULAR; INTRAVENOUS ONCE AS NEEDED
Status: CANCELLED
Start: 2023-09-20

## 2023-09-19 RX ORDER — PROCHLORPERAZINE EDISYLATE 5 MG/ML
5 INJECTION INTRAMUSCULAR; INTRAVENOUS ONCE AS NEEDED
Status: DISCONTINUED | OUTPATIENT
Start: 2023-09-19 | End: 2023-09-19 | Stop reason: HOSPADM

## 2023-09-19 RX ORDER — HEPARIN 100 UNIT/ML
500 SYRINGE INTRAVENOUS
Status: DISCONTINUED | OUTPATIENT
Start: 2023-09-19 | End: 2023-09-19 | Stop reason: HOSPADM

## 2023-09-19 RX ADMIN — IRINOTECAN HYDROCHLORIDE 280 MG: 20 INJECTION, SOLUTION INTRAVENOUS at 01:09

## 2023-09-19 RX ADMIN — BEVACIZUMAB-AWWB 270 MG: 100 INJECTION, SOLUTION INTRAVENOUS at 12:09

## 2023-09-19 RX ADMIN — DEXAMETHASONE SODIUM PHOSPHATE 0.25 MG: 4 INJECTION, SOLUTION INTRA-ARTICULAR; INTRALESIONAL; INTRAMUSCULAR; INTRAVENOUS; SOFT TISSUE at 01:09

## 2023-09-19 RX ADMIN — FLUOROURACIL 3840 MG: 50 INJECTION, SOLUTION INTRAVENOUS at 03:09

## 2023-09-19 RX ADMIN — SODIUM CHLORIDE: 9 INJECTION, SOLUTION INTRAVENOUS at 12:09

## 2023-09-19 RX ADMIN — POTASSIUM CHLORIDE 40 MEQ: 1500 TABLET, EXTENDED RELEASE ORAL at 12:09

## 2023-09-19 RX ADMIN — ATROPINE SULFATE 0.4 MG: 0.4 INJECTION, SOLUTION INTRAVENOUS at 01:09

## 2023-09-19 NOTE — PLAN OF CARE
1300 pt here for D1C6 mvasi/folfiri infusion, labs, hx, meds, allergies reviewed, pt with no new complaints at this time, reclined in chair, warm blanket provided, continue to monitor

## 2023-09-19 NOTE — PLAN OF CARE
1535  pt tolerated mvasi and folfiri infuson without issue, cadd pump infusing at 2.2 ml/hr without issue prior to d/c, pt to rtc Thursday around 1330, no distress noted upon d/c to home

## 2023-09-19 NOTE — PROGRESS NOTES
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 Bonilla  12/30/2022: Y-90 to R liver  1/27/2023: Y-90 to R liver  5/17/2023: Y-90 to R liver  7/11/2023: C1 FOLFIRI + Pema  7/26/2023: C2 FOLFIRI + Pema  8/8/2023: C3 FOLFIRI + Pema  8/22/23: C4 FOLFIRI + Pema  Restaging CT CAP 9/1/23: Good response to chemo.  9/7/23: C5 FOLFIRI + Pema  9/19/23: C6 FOLFIRI + Pema      Interval History:   He presents today with his daughter prior to cycle 6 of FOLFIRI plus avastin. Continues to do very well overall. States his energy has improved and he remains active. Eating well and weight stable. No further rashes. Denies fever/chills, SOB, CP, palpitations, N/V, C/D, pain, blood in urine/stool, paresthesias.     ECOG status is 1.     Past Medical History:   Past Medical History:   Diagnosis Date    MARIA A (acute kidney injury) 8/18/2022    Hypertension     Metastatic colon cancer to liver 4/22/2021     Past Surgical HIstory:   Past Surgical History:   Procedure Laterality Date    COLONOSCOPY N/A 4/20/2021    Procedure: COLONOSCOPY with possible stent;  Surgeon: EDILMA Bonilla MD;  Location: [facility] (2ND FLR);  Service: Colon and Rectal;  Laterality: N/A;    DIAGNOSTIC LAPAROSCOPY N/A 9/7/2022    Procedure: LAPAROSCOPY, DIAGNOSTIC;  Surgeon: Adrian Rodriguez MD;  Location: [facility] OR  2ND FLR;  Service: General;  Laterality: N/A;    INSERTION OF TUNNELED CENTRAL VENOUS CATHETER (CVC) WITH SUBCUTANEOUS PORT N/A 5/3/2021    Procedure: [id number];  Surgeon: Francisco Layton MD;  Location: [facility] OR 2ND FLR;  Service: Vascular;  Laterality: N/A;    OMENTECTOMY N/A 10/20/2022    Procedure: OMENTECTOMY;  Surgeon: EDILMA Bonilla MD;  Location: [facility] OR 2ND FLR;  Service: Colon and Rectal;  Laterality: N/A;    RIGHT HEMICOLECTOMY N/A 10/20/2022    Procedure: HEMICOLECTOMY, RIGHT, extended;  Surgeon: EDILMA Bonilla MD;  Location: [facility] OR 2ND FLR;  Service: Colon and Rectal;  Laterality: N/A;  Extended right hemicolectomy CONSENT IN AM     Family History: History reviewed. No pertinent family history.    Social History:  reports that he has never smoked. He has never used smokeless tobacco. He reports that he does not currently use alcohol. He reports that he does not use drugs.    Allergies:  Review of patient's allergies indicates:  No Known Allergies    Medications:  Current Outpatient Medications   Medication Sig Dispense Refill    acetaminophen (TYLENOL) 500 MG tablet Take 1 tablet (500 mg total) by mouth every 6 (six) hours as needed for Pain (alternate with ibuprofen). (Patient not taking: Reported on 6/28/2023)  0    amLODIPine (NORVASC) 10 MG tablet Take 1 tablet (10 mg total) by mouth once daily. 90 tablet 3    capecitabine (XELODA) 500 MG Tab Take 3 tablets (1,500 mg) by mouth twice daily with food on days 1 thru 7 of 14 day cycles. (Patient not taking: Reported on 6/28/2023.) 84 tablet 5    ibuprofen (ADVIL,MOTRIN) 400 MG tablet Take 1 tablet (400 mg total) by mouth every 6 (six) hours as needed for Other (pain). Alternate with tylenol (Patient not taking: Reported on 6/28/2023)      ondansetron (ZOFRAN) 4 MG tablet Take 1 tablet (4 mg total) by mouth every 8 (eight) hours as needed for Nausea. (Patient not taking: Reported on 6/28/2023) 30 tablet 3    oxyCODONE (ROXICODONE) 5 MG  "immediate release tablet Take 1 tablet (5 mg total) by mouth every 6 (six) hours as needed for Pain. (Patient not taking: Reported on 6/28/2023) 20 tablet 0    tamsulosin (FLOMAX) 0.4 mg Cap Take 1 capsule (0.4 mg total) by mouth once daily. (Patient not taking: Reported on 6/28/2023) 30 capsule 5     No current facility-administered medications for this visit.     Review of Systems   Constitutional:  Positive for fatigue (improved). Negative for activity change, appetite change, chills, diaphoresis, fever and unexpected weight change.   HENT:  Negative for congestion, mouth sores, nosebleeds, postnasal drip, rhinorrhea, trouble swallowing and voice change.    Eyes:  Negative for pain and visual disturbance.   Respiratory:  Negative for cough, chest tightness, shortness of breath and wheezing.    Cardiovascular:  Negative for chest pain, palpitations and leg swelling.   Gastrointestinal:  Negative for abdominal distention, abdominal pain, blood in stool, constipation, diarrhea, nausea and vomiting.   Genitourinary:  Negative for difficulty urinating, dysuria, flank pain, frequency, hematuria and urgency.   Musculoskeletal:  Negative for arthralgias, back pain and myalgias.   Skin:  Negative for rash and wound.   Neurological:  Negative for dizziness, facial asymmetry, weakness, light-headedness, numbness and headaches.   Psychiatric/Behavioral:  Negative for agitation, behavioral problems, confusion, decreased concentration, dysphoric mood and sleep disturbance. The patient is not nervous/anxious.    All other systems reviewed and are negative.    ECOG Performance Status: 1     Objective:      Vitals:   Vitals:    09/19/23 1029   BP: 103/60   BP Location: Left arm   Patient Position: Sitting   BP Method: Medium (Automatic)   Pulse: 78   Resp: 18   SpO2: 100%   Weight: 52.3 kg (115 lb 4.8 oz)   Height: 5' 4" (1.626 m)     Physical Exam  Vitals reviewed.   Constitutional:       General: He is not in acute distress.    "  Appearance: Normal appearance. He is not ill-appearing, toxic-appearing or diaphoretic.   HENT:      Head: Normocephalic and atraumatic.      Right Ear: External ear normal.      Left Ear: External ear normal.   Eyes:      General: No scleral icterus.     Extraocular Movements: Extraocular movements intact.      Conjunctiva/sclera: Conjunctivae normal.      Pupils: Pupils are equal, round, and reactive to light.   Cardiovascular:      Rate and Rhythm: Normal rate and regular rhythm.      Pulses: Normal pulses.      Heart sounds: Normal heart sounds. No murmur heard.  Pulmonary:      Effort: Pulmonary effort is normal. No respiratory distress.      Breath sounds: Normal breath sounds. No wheezing.   Chest:      Comments: Port to RCW, no signs of infection.  Abdominal:      General: Abdomen is flat. Bowel sounds are normal. There is no distension.      Palpations: Abdomen is soft. There is no mass.      Tenderness: There is no abdominal tenderness.      Comments: Vertical midline abdominal wound well healed   Musculoskeletal:         General: No swelling or deformity. Normal range of motion.      Right lower leg: No edema.      Left lower leg: No edema.   Skin:     Coloration: Skin is not jaundiced or pale.      Findings: No bruising, erythema or rash.   Neurological:      General: No focal deficit present.      Mental Status: He is alert and oriented to person, place, and time. Mental status is at baseline.      Cranial Nerves: No cranial nerve deficit.      Sensory: No sensory deficit.      Gait: Gait normal.   Psychiatric:         Mood and Affect: Mood normal.         Behavior: Behavior normal.         Thought Content: Thought content normal.         Judgment: Judgment normal.     Laboratory Data:  Lab Visit on 09/19/2023   Component Date Value Ref Range Status    WBC 09/19/2023 3.73 (L)  3.90 - 12.70 K/uL Final    RBC 09/19/2023 3.56 (L)  4.60 - 6.20 M/uL Final    Hemoglobin 09/19/2023 9.1 (L)  14.0 - 18.0 g/dL  Final    Hematocrit 09/19/2023 29.4 (L)  40.0 - 54.0 % Final    MCV 09/19/2023 83  82 - 98 fL Final    MCH 09/19/2023 25.6 (L)  27.0 - 31.0 pg Final    MCHC 09/19/2023 31.0 (L)  32.0 - 36.0 g/dL Final    RDW 09/19/2023 19.4 (H)  11.5 - 14.5 % Final    Platelets 09/19/2023 250  150 - 450 K/uL Final    MPV 09/19/2023 9.8  9.2 - 12.9 fL Final    Immature Granulocytes 09/19/2023 0.3  0.0 - 0.5 % Final    Gran # (ANC) 09/19/2023 2.0  1.8 - 7.7 K/uL Final    Immature Grans (Abs) 09/19/2023 0.01  0.00 - 0.04 K/uL Final    Comment: Mild elevation in immature granulocytes is non specific and   can be seen in a variety of conditions including stress response,   acute inflammation, trauma and pregnancy. Correlation with other   laboratory and clinical findings is essential.      Lymph # 09/19/2023 1.0  1.0 - 4.8 K/uL Final    Mono # 09/19/2023 0.5  0.3 - 1.0 K/uL Final    Eos # 09/19/2023 0.3  0.0 - 0.5 K/uL Final    Baso # 09/19/2023 0.03  0.00 - 0.20 K/uL Final    nRBC 09/19/2023 0  0 /100 WBC Final    Gran % 09/19/2023 52.8  38.0 - 73.0 % Final    Lymph % 09/19/2023 26.0  18.0 - 48.0 % Final    Mono % 09/19/2023 12.9  4.0 - 15.0 % Final    Eosinophil % 09/19/2023 7.2  0.0 - 8.0 % Final    Basophil % 09/19/2023 0.8  0.0 - 1.9 % Final    Differential Method 09/19/2023 Automated   Final    Sodium 09/19/2023 139  136 - 145 mmol/L Final    Potassium 09/19/2023 3.3 (L)  3.5 - 5.1 mmol/L Final    Chloride 09/19/2023 108  95 - 110 mmol/L Final    CO2 09/19/2023 25  23 - 29 mmol/L Final    Glucose 09/19/2023 110  70 - 110 mg/dL Final    BUN 09/19/2023 8  8 - 23 mg/dL Final    Creatinine 09/19/2023 0.8  0.5 - 1.4 mg/dL Final    Calcium 09/19/2023 9.0  8.7 - 10.5 mg/dL Final    Total Protein 09/19/2023 6.6  6.0 - 8.4 g/dL Final    Albumin 09/19/2023 2.8 (L)  3.5 - 5.2 g/dL Final    Total Bilirubin 09/19/2023 0.6  0.1 - 1.0 mg/dL Final    Comment: For infants and newborns, interpretation of results should be based  on gestational age,  weight and in agreement with clinical  observations.    Premature Infant recommended reference ranges:  Up to 24 hours.............<8.0 mg/dL  Up to 48 hours............<12.0 mg/dL  3-5 days..................<15.0 mg/dL  6-29 days.................<15.0 mg/dL      Alkaline Phosphatase 09/19/2023 183 (H)  55 - 135 U/L Final    AST 09/19/2023 27  10 - 40 U/L Final    ALT 09/19/2023 15  10 - 44 U/L Final    eGFR 09/19/2023 >60.0  >60 mL/min/1.73 m^2 Final    Anion Gap 09/19/2023 6 (L)  8 - 16 mmol/L Final    CEA 09/19/2023 13.4 (H)  0.0 - 5.0 ng/mL Final    Comment: CEA Normal Range:  Non-Smokers: 0-3.0 ng/mL  Smokers:     0-5.0 ng/mL    The testing method is a chemiluminescent microparticle immunoassay   manufactured by [employer] and performed on the [employer]   or   [employer] system. Values obtained with different assay manufacturers   for   methods may be different and cannot be used interchangeably.     Lab Visit on 09/19/2023   Component Date Value Ref Range Status    Specimen UA 09/19/2023 Urine, Unspecified   Final    Color, UA 09/19/2023 Yellow  Yellow, Straw, [place] Final    Appearance, UA 09/19/2023 Clear  Clear Final    pH, UA 09/19/2023 5.0  5.0 - 8.0 Final    Specific Gravity, UA 09/19/2023 1.010  1.005 - 1.030 Final    Protein, UA 09/19/2023 Negative  Negative Final    Comment: Recommend a 24 hour urine protein or a urine   protein/creatinine ratio if globulin induced proteinuria is  clinically suspected.      Glucose, UA 09/19/2023 Negative  Negative Final    Ketones, UA 09/19/2023 Negative  Negative Final    Bilirubin (UA) 09/19/2023 Negative  Negative Final    Occult Blood UA 09/19/2023 Negative  Negative Final    Nitrite, UA 09/19/2023 Negative  Negative Final    Leukocytes, UA 09/19/2023 Negative  Negative Final     Assessment and Plan        1. 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 Immunodeficiency secondary to neoplasm    4. Primary hypertension     5. MARIA A (acute kidney injury)    6. Anemia associated with chemotherapy    7. Pain    8. Drug-induced constipation    9. Weight decrease    10. Severe malnutrition    11. Lower urinary tract symptoms (LUTS)    12. Chemotherapy induced neutropenia    13. Hypokalemia      1-3.  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  Guardant 360 was negative for BRAF, VIRI, HER2 amplification.   Pretreatment CEA 57.  We had a long and sonia discussion with him about his diagnosis. Unfortunately, the disease is not curable but remains treatable and he has good performance status.   Restaging scans after 7 cycles of FOLFOX + Pema showed significant reduction in colonic mass and liver mass.   we switched to maintenance as of cycle 8 with 5FU + Pema  Restaging scans from March 2022 show stable disease.   MRI on 7/18/22 showing hepatic progression of disease in the right hepatic lobe noted on the exam. CT CAP on 8/26 shows some mild progression in both liver metastases.    Discussed case at colorectal tumor board 8/31/22 and had a diagnostic lap performed by Dr. Rodriguez on 9/7 to assess for any occult peritoneal disease. Findings from the diagnostic lap were negative. Fluid from around from around the primary mass was sent for cytology that was negative for malignancy.   Underwent primary tumor resection on 10/20/22 with Dr. Bonilla.    Meanwhile his liver mets had grown.  We discussed his case at [facility] conference with plans for short term Y-90 and then restarting chemotherapy prior to considering any surgical options to his liver metastases.  He underwent Y-90 delivery on 12/30/22. Tolerated well.   CT CAP on 1/23/23 reviewed at [facility] conference with good response to Y-90, no new lesions.  Underwent second Y-90 on 1/27/23. Can consider R hepatectomy pending follow-up imaging after Y-90.     Received cycle 42 of FOLFOX (omitted oxaliplatin again starting cycle 41 due to neuropathy) on 2/9/23.  Because  of 5-FU shortage nationally, we have been holding chemotherapy since mid-February 2023.  If he needs to restart chemotherapy (I.e. no plans for surgical resection), will place him on capecitabine maintenance for duration of 5-FU shortage.     Discussed at colorectal liver mets tumor board 3/16/23.  Plan for repeat Y-90 and then consideration of surgical resection and he will meet with liver transplant team as well.  Underwent Y-90 delivery 5/17/23 with IR.     Has been on maintenance Xeloda since late April 2023.    Met with liver transplant team but ultimately opted not to proceed with transplant as he was concerned about how he would tolerate a major surgery with the life changes that would come after transplant as well. They closed out his case.    He met with Dr. Rodriguez to discuss whether surgical resection is indicated for his liver mets.  He recommended repeat MRI.  Repeat MRI unfortunately showed disease progression while on Xeloda maintenance.  Similarly his CEA garrett precipitously, all in keeping with worsening disease.    We recommended we restart IV chemotherapy with FOLFIRI + Avastin (never had irinotecan).    Because of hyperbilirubinemia he received cycle 1 with just 5-FU + Avastin on 7/11/23. Tolerated this well. Bilirubin has improved.  Received cycle 2-4 with irinotecan.  Repeat CT CAP after cycle 4 shows good response to therapy.   Excellent tolerance. [facility] tumor board agrees with continuation of chemotherapy.     Labs reviewed and acceptable to proceed with cycle 6 today. Will give PO K+ today for mild hypokalemia.    -RTC in 2 weeks for next cycle. Will repeat imaging studies after cycle 8.    Tempus: APC, CKS1B cng, ERCC3 cnl, PIK3CA; KENIA, TMB 12.1.    4. Hypertension   -- BP normal today. Feels well.   -- taking Amlodipine .   -- Following with PCP.   -- encouraged him and his daughter to monitor BP and HR closely at home.     5. MARIA A  -- Resolved. Cr normal.   -- Monitor. Encouraged continued  hydration particularly with working outside.     6. Anemia  -- Hgb stable.  Monitor.  -- No signs of bleeding. Platelets normal.     7-10. Neoplasm related pain, weight loss, constipation  -- Pain very well controlled.  Has oxycodone 5mg to use PRN.  -- Following with nutrition. Encouraged increased protein. Weight up one pound.  Monitor.  -- Continue Miralax daily for constipation.    11. Urinary hesitancy  -- Continue Flomax.  -- Reports improvement since starting medication.     Patient is in agreement with the proposed treatment plan. All questions were answered to the patient's satisfaction. Pt knows to call clinic if anything is needed before the next clinic visit.    Patient discussed with collaborating physician, Dr. Schuster.    At least 40 minutes were spent today on this encounter including face to face time with the patient, data gathering/interpretation and documentation.       Natividad Maher, MSN, APRN, ACCNS-AG  Hematology and Medical Oncology  Clinical Nurse Specialist to Dr. Schuster, Dr. Quijano & Dr. Mueller    Route Chart for Scheduling    Med Onc Chart Routing      Follow up with physician 2 weeks. as scheduled   Follow up with RACHEL 4 weeks. as scheduled   Infusion scheduling note   treatment every 2 weeks, pump d/c on day 3   Injection scheduling note    Labs CBC, CMP, CEA and urinalysis   Scheduling:  Preferred lab:  Lab interval: every 2 weeks     Imaging    Pharmacy appointment    Other referrals

## 2023-09-21 ENCOUNTER — INFUSION (OUTPATIENT)
Dept: INFUSION THERAPY | Facility: HOSPITAL | Age: 73
End: 2023-09-21
Payer: MEDICARE

## 2023-09-21 VITALS
TEMPERATURE: 98 F | HEART RATE: 91 BPM | RESPIRATION RATE: 18 BRPM | SYSTOLIC BLOOD PRESSURE: 122 MMHG | DIASTOLIC BLOOD PRESSURE: 72 MMHG

## 2023-09-21 DIAGNOSIS — C78.7 METASTATIC COLON CANCER TO LIVER: Primary | ICD-10-CM

## 2023-09-21 DIAGNOSIS — C18.9 METASTATIC COLON CANCER TO LIVER: Primary | ICD-10-CM

## 2023-09-21 PROCEDURE — 63600175 PHARM REV CODE 636 W HCPCS: Performed by: REGISTERED NURSE

## 2023-09-21 PROCEDURE — 25000003 PHARM REV CODE 250: Performed by: REGISTERED NURSE

## 2023-09-21 PROCEDURE — A4216 STERILE WATER/SALINE, 10 ML: HCPCS | Performed by: REGISTERED NURSE

## 2023-09-21 RX ORDER — HEPARIN 100 UNIT/ML
500 SYRINGE INTRAVENOUS
Status: DISCONTINUED | OUTPATIENT
Start: 2023-09-21 | End: 2023-09-21 | Stop reason: HOSPADM

## 2023-09-21 RX ORDER — SODIUM CHLORIDE 0.9 % (FLUSH) 0.9 %
10 SYRINGE (ML) INJECTION
Status: DISCONTINUED | OUTPATIENT
Start: 2023-09-21 | End: 2023-09-21 | Stop reason: HOSPADM

## 2023-09-21 RX ADMIN — HEPARIN 500 UNITS: 100 SYRINGE at 01:09

## 2023-09-21 RX ADMIN — Medication 10 ML: at 01:09

## 2023-09-21 NOTE — PLAN OF CARE
Pt here for d/c pump infusion complete, port flushed per policy, deaccessed without issue. Pt to rtc 10/3/23. No distress noted upon d/c to homewith daughter.

## 2023-10-03 ENCOUNTER — INFUSION (OUTPATIENT)
Dept: INFUSION THERAPY | Facility: HOSPITAL | Age: 73
End: 2023-10-03
Payer: MEDICARE

## 2023-10-03 ENCOUNTER — OFFICE VISIT (OUTPATIENT)
Dept: HEMATOLOGY/ONCOLOGY | Facility: CLINIC | Age: 73
End: 2023-10-03
Payer: MEDICARE

## 2023-10-03 VITALS
WEIGHT: 114.75 LBS | HEIGHT: 64 IN | SYSTOLIC BLOOD PRESSURE: 117 MMHG | OXYGEN SATURATION: 100 % | BODY MASS INDEX: 19.59 KG/M2 | HEART RATE: 81 BPM | DIASTOLIC BLOOD PRESSURE: 77 MMHG | RESPIRATION RATE: 18 BRPM

## 2023-10-03 VITALS
SYSTOLIC BLOOD PRESSURE: 110 MMHG | DIASTOLIC BLOOD PRESSURE: 60 MMHG | WEIGHT: 114.75 LBS | HEIGHT: 64 IN | RESPIRATION RATE: 18 BRPM | TEMPERATURE: 97 F | HEART RATE: 63 BPM | BODY MASS INDEX: 19.59 KG/M2

## 2023-10-03 DIAGNOSIS — E43 SEVERE MALNUTRITION: ICD-10-CM

## 2023-10-03 DIAGNOSIS — K59.03 DRUG-INDUCED CONSTIPATION: ICD-10-CM

## 2023-10-03 DIAGNOSIS — T45.1X5A ANEMIA ASSOCIATED WITH CHEMOTHERAPY: ICD-10-CM

## 2023-10-03 DIAGNOSIS — R63.4 WEIGHT DECREASE: ICD-10-CM

## 2023-10-03 DIAGNOSIS — C18.9 METASTATIC COLON CANCER TO LIVER: Primary | ICD-10-CM

## 2023-10-03 DIAGNOSIS — D64.81 ANEMIA ASSOCIATED WITH CHEMOTHERAPY: ICD-10-CM

## 2023-10-03 DIAGNOSIS — C78.7 METASTATIC COLON CANCER TO LIVER: Primary | ICD-10-CM

## 2023-10-03 DIAGNOSIS — N17.9 AKI (ACUTE KIDNEY INJURY): ICD-10-CM

## 2023-10-03 DIAGNOSIS — D84.81 IMMUNODEFICIENCY SECONDARY TO NEOPLASM: ICD-10-CM

## 2023-10-03 DIAGNOSIS — D84.821 IMMUNODEFICIENCY DUE TO CHEMOTHERAPY: ICD-10-CM

## 2023-10-03 DIAGNOSIS — R39.9 LOWER URINARY TRACT SYMPTOMS (LUTS): ICD-10-CM

## 2023-10-03 DIAGNOSIS — T45.1X5A IMMUNODEFICIENCY DUE TO CHEMOTHERAPY: ICD-10-CM

## 2023-10-03 DIAGNOSIS — I10 PRIMARY HYPERTENSION: ICD-10-CM

## 2023-10-03 DIAGNOSIS — D49.9 IMMUNODEFICIENCY SECONDARY TO NEOPLASM: ICD-10-CM

## 2023-10-03 DIAGNOSIS — Z79.899 IMMUNODEFICIENCY DUE TO CHEMOTHERAPY: ICD-10-CM

## 2023-10-03 DIAGNOSIS — R52 PAIN: ICD-10-CM

## 2023-10-03 LAB
BASOPHILS # BLD AUTO: 0.03 K/UL (ref 0–0.2)
BASOPHILS NFR BLD: 0.7 % (ref 0–1.9)
DIFFERENTIAL METHOD: ABNORMAL
EOSINOPHIL # BLD AUTO: 0.3 K/UL (ref 0–0.5)
EOSINOPHIL NFR BLD: 6.5 % (ref 0–8)
ERYTHROCYTE [DISTWIDTH] IN BLOOD BY AUTOMATED COUNT: 19.6 % (ref 11.5–14.5)
HCT VFR BLD AUTO: 33.3 % (ref 40–54)
HGB BLD-MCNC: 10 G/DL (ref 14–18)
IMM GRANULOCYTES # BLD AUTO: 0.01 K/UL (ref 0–0.04)
IMM GRANULOCYTES NFR BLD AUTO: 0.2 % (ref 0–0.5)
LYMPHOCYTES # BLD AUTO: 0.8 K/UL (ref 1–4.8)
LYMPHOCYTES NFR BLD: 18.3 % (ref 18–48)
MCH RBC QN AUTO: 25 PG (ref 27–31)
MCHC RBC AUTO-ENTMCNC: 30 G/DL (ref 32–36)
MCV RBC AUTO: 83 FL (ref 82–98)
MONOCYTES # BLD AUTO: 0.7 K/UL (ref 0.3–1)
MONOCYTES NFR BLD: 15.3 % (ref 4–15)
NEUTROPHILS # BLD AUTO: 2.7 K/UL (ref 1.8–7.7)
NEUTROPHILS NFR BLD: 59 % (ref 38–73)
NRBC BLD-RTO: 0 /100 WBC
PLATELET # BLD AUTO: 221 K/UL (ref 150–450)
PMV BLD AUTO: 9.6 FL (ref 9.2–12.9)
RBC # BLD AUTO: 4 M/UL (ref 4.6–6.2)
WBC # BLD AUTO: 4.59 K/UL (ref 3.9–12.7)

## 2023-10-03 PROCEDURE — 3008F PR BODY MASS INDEX (BMI) DOCUMENTED: ICD-10-PCS | Mod: CPTII,S$GLB,, | Performed by: INTERNAL MEDICINE

## 2023-10-03 PROCEDURE — 96413 CHEMO IV INFUSION 1 HR: CPT

## 2023-10-03 PROCEDURE — 99999 PR PBB SHADOW E&M-EST. PATIENT-LVL III: CPT | Mod: PBBFAC,,, | Performed by: INTERNAL MEDICINE

## 2023-10-03 PROCEDURE — 1126F PR PAIN SEVERITY QUANTIFIED, NO PAIN PRESENT: ICD-10-PCS | Mod: CPTII,S$GLB,, | Performed by: INTERNAL MEDICINE

## 2023-10-03 PROCEDURE — 1101F PR PT FALLS ASSESS DOC 0-1 FALLS W/OUT INJ PAST YR: ICD-10-PCS | Mod: CPTII,S$GLB,, | Performed by: INTERNAL MEDICINE

## 2023-10-03 PROCEDURE — 1126F AMNT PAIN NOTED NONE PRSNT: CPT | Mod: CPTII,S$GLB,, | Performed by: INTERNAL MEDICINE

## 2023-10-03 PROCEDURE — 96417 CHEMO IV INFUS EACH ADDL SEQ: CPT

## 2023-10-03 PROCEDURE — 1101F PT FALLS ASSESS-DOCD LE1/YR: CPT | Mod: CPTII,S$GLB,, | Performed by: INTERNAL MEDICINE

## 2023-10-03 PROCEDURE — 3078F PR MOST RECENT DIASTOLIC BLOOD PRESSURE < 80 MM HG: ICD-10-PCS | Mod: CPTII,S$GLB,, | Performed by: INTERNAL MEDICINE

## 2023-10-03 PROCEDURE — 63600175 PHARM REV CODE 636 W HCPCS: Performed by: INTERNAL MEDICINE

## 2023-10-03 PROCEDURE — 96367 TX/PROPH/DG ADDL SEQ IV INF: CPT

## 2023-10-03 PROCEDURE — 3074F SYST BP LT 130 MM HG: CPT | Mod: CPTII,S$GLB,, | Performed by: INTERNAL MEDICINE

## 2023-10-03 PROCEDURE — 85025 COMPLETE CBC W/AUTO DIFF WBC: CPT | Performed by: INTERNAL MEDICINE

## 2023-10-03 PROCEDURE — 3288F PR FALLS RISK ASSESSMENT DOCUMENTED: ICD-10-PCS | Mod: CPTII,S$GLB,, | Performed by: INTERNAL MEDICINE

## 2023-10-03 PROCEDURE — 3078F DIAST BP <80 MM HG: CPT | Mod: CPTII,S$GLB,, | Performed by: INTERNAL MEDICINE

## 2023-10-03 PROCEDURE — 96415 CHEMO IV INFUSION ADDL HR: CPT

## 2023-10-03 PROCEDURE — 1159F PR MEDICATION LIST DOCUMENTED IN MEDICAL RECORD: ICD-10-PCS | Mod: CPTII,S$GLB,, | Performed by: INTERNAL MEDICINE

## 2023-10-03 PROCEDURE — 96375 TX/PRO/DX INJ NEW DRUG ADDON: CPT

## 2023-10-03 PROCEDURE — 1159F MED LIST DOCD IN RCRD: CPT | Mod: CPTII,S$GLB,, | Performed by: INTERNAL MEDICINE

## 2023-10-03 PROCEDURE — 25000003 PHARM REV CODE 250: Performed by: INTERNAL MEDICINE

## 2023-10-03 PROCEDURE — 99215 PR OFFICE/OUTPT VISIT, EST, LEVL V, 40-54 MIN: ICD-10-PCS | Mod: S$GLB,,, | Performed by: INTERNAL MEDICINE

## 2023-10-03 PROCEDURE — 3074F PR MOST RECENT SYSTOLIC BLOOD PRESSURE < 130 MM HG: ICD-10-PCS | Mod: CPTII,S$GLB,, | Performed by: INTERNAL MEDICINE

## 2023-10-03 PROCEDURE — 99999 PR PBB SHADOW E&M-EST. PATIENT-LVL III: ICD-10-PCS | Mod: PBBFAC,,, | Performed by: INTERNAL MEDICINE

## 2023-10-03 PROCEDURE — 3288F FALL RISK ASSESSMENT DOCD: CPT | Mod: CPTII,S$GLB,, | Performed by: INTERNAL MEDICINE

## 2023-10-03 PROCEDURE — 3008F BODY MASS INDEX DOCD: CPT | Mod: CPTII,S$GLB,, | Performed by: INTERNAL MEDICINE

## 2023-10-03 PROCEDURE — 99215 OFFICE O/P EST HI 40 MIN: CPT | Mod: S$GLB,,, | Performed by: INTERNAL MEDICINE

## 2023-10-03 RX ORDER — HEPARIN 100 UNIT/ML
500 SYRINGE INTRAVENOUS
Status: CANCELLED | OUTPATIENT
Start: 2023-10-04

## 2023-10-03 RX ORDER — PROCHLORPERAZINE EDISYLATE 5 MG/ML
5 INJECTION INTRAMUSCULAR; INTRAVENOUS ONCE AS NEEDED
Status: DISCONTINUED | OUTPATIENT
Start: 2023-10-03 | End: 2023-10-03 | Stop reason: HOSPADM

## 2023-10-03 RX ORDER — HEPARIN 100 UNIT/ML
500 SYRINGE INTRAVENOUS
Status: CANCELLED | OUTPATIENT
Start: 2023-10-06

## 2023-10-03 RX ORDER — SODIUM CHLORIDE 0.9 % (FLUSH) 0.9 %
10 SYRINGE (ML) INJECTION
Status: DISCONTINUED | OUTPATIENT
Start: 2023-10-03 | End: 2023-10-03 | Stop reason: HOSPADM

## 2023-10-03 RX ORDER — SODIUM CHLORIDE 0.9 % (FLUSH) 0.9 %
10 SYRINGE (ML) INJECTION
Status: CANCELLED | OUTPATIENT
Start: 2023-10-06

## 2023-10-03 RX ORDER — EPINEPHRINE 0.3 MG/.3ML
0.3 INJECTION SUBCUTANEOUS ONCE AS NEEDED
Status: CANCELLED | OUTPATIENT
Start: 2023-10-04

## 2023-10-03 RX ORDER — DIPHENHYDRAMINE HYDROCHLORIDE 50 MG/ML
50 INJECTION INTRAMUSCULAR; INTRAVENOUS ONCE AS NEEDED
Status: DISCONTINUED | OUTPATIENT
Start: 2023-10-03 | End: 2023-10-03 | Stop reason: HOSPADM

## 2023-10-03 RX ORDER — PROCHLORPERAZINE EDISYLATE 5 MG/ML
5 INJECTION INTRAMUSCULAR; INTRAVENOUS ONCE AS NEEDED
Status: CANCELLED
Start: 2023-10-04

## 2023-10-03 RX ORDER — ATROPINE SULFATE 0.4 MG/ML
0.4 INJECTION, SOLUTION ENDOTRACHEAL; INTRAMEDULLARY; INTRAMUSCULAR; INTRAVENOUS; SUBCUTANEOUS ONCE AS NEEDED
Status: CANCELLED | OUTPATIENT
Start: 2023-10-04

## 2023-10-03 RX ORDER — HEPARIN 100 UNIT/ML
500 SYRINGE INTRAVENOUS
Status: DISCONTINUED | OUTPATIENT
Start: 2023-10-03 | End: 2023-10-03 | Stop reason: HOSPADM

## 2023-10-03 RX ORDER — DIPHENHYDRAMINE HYDROCHLORIDE 50 MG/ML
50 INJECTION INTRAMUSCULAR; INTRAVENOUS ONCE AS NEEDED
Status: CANCELLED | OUTPATIENT
Start: 2023-10-04

## 2023-10-03 RX ORDER — SODIUM CHLORIDE 0.9 % (FLUSH) 0.9 %
10 SYRINGE (ML) INJECTION
Status: CANCELLED | OUTPATIENT
Start: 2023-10-04

## 2023-10-03 RX ORDER — ATROPINE SULFATE 0.4 MG/ML
0.4 INJECTION, SOLUTION ENDOTRACHEAL; INTRAMEDULLARY; INTRAMUSCULAR; INTRAVENOUS; SUBCUTANEOUS ONCE AS NEEDED
Status: COMPLETED | OUTPATIENT
Start: 2023-10-03 | End: 2023-10-03

## 2023-10-03 RX ORDER — EPINEPHRINE 0.3 MG/.3ML
0.3 INJECTION SUBCUTANEOUS ONCE AS NEEDED
Status: DISCONTINUED | OUTPATIENT
Start: 2023-10-03 | End: 2023-10-03 | Stop reason: HOSPADM

## 2023-10-03 RX ADMIN — ATROPINE SULFATE 0.4 MG: 0.4 INJECTION, SOLUTION INTRAVENOUS at 01:10

## 2023-10-03 RX ADMIN — FLUOROURACIL 3840 MG: 50 INJECTION, SOLUTION INTRAVENOUS at 02:10

## 2023-10-03 RX ADMIN — IRINOTECAN HYDROCHLORIDE 280 MG: 20 INJECTION, SOLUTION INTRAVENOUS at 01:10

## 2023-10-03 RX ADMIN — BEVACIZUMAB-AWWB 270 MG: 100 INJECTION, SOLUTION INTRAVENOUS at 12:10

## 2023-10-03 RX ADMIN — DEXAMETHASONE SODIUM PHOSPHATE 0.25 MG: 4 INJECTION, SOLUTION INTRA-ARTICULAR; INTRALESIONAL; INTRAMUSCULAR; INTRAVENOUS; SOFT TISSUE at 12:10

## 2023-10-03 NOTE — PROGRESS NOTES
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 Bonilla  12/30/2022: Y-90 to R liver  1/27/2023: Y-90 to R liver  5/17/2023: Y-90 to R liver  7/11/2023: C1 FOLFIRI + Pema  7/26/2023: C2 FOLFIRI + Pema  8/8/2023: C3 FOLFIRI + Pema  8/22/23: C4 FOLFIRI + Pema  Restaging CT CAP 9/1/23: Good response to chemo.  9/7/23: C5 FOLFIRI + Pema  9/19/23: C6 FOLFIRI + Pema  10/3/23: C7 FOLFIRI + Pema      Interval History:   He presents today with his daughter prior to cycle 7 of FOLFIRI plus avastin. Continues to feel well.  Not using oxycodone due to lack of pain.  No diarrhea, nausea or vomiting.  Very mild fatigue.      ECOG status is 1.     Past Medical History:   Past Medical History:   Diagnosis Date    MARIA A (acute kidney injury) 8/18/2022    Hypertension     Metastatic colon cancer to liver 4/22/2021     Past Surgical HIstory:   Past Surgical History:   Procedure Laterality Date    COLONOSCOPY N/A 4/20/2021    Procedure: COLONOSCOPY with possible stent;  Surgeon: EDILMA Bonilla MD;  Location: [facility] (2ND FLR);  Service: Colon and Rectal;  Laterality: N/A;    DIAGNOSTIC LAPAROSCOPY N/A 9/7/2022    Procedure: LAPAROSCOPY, DIAGNOSTIC;  Surgeon: Adrian Rodriguez MD;  Location: [facility] OR 2ND FLR;  Service: General;  Laterality: N/A;    INSERTION OF TUNNELED CENTRAL VENOUS  CATHETER (CVC) WITH SUBCUTANEOUS PORT N/A 5/3/2021    Procedure: [id number];  Surgeon: Francisco Layton MD;  Location: [facility] OR 2ND FLR;  Service: Vascular;  Laterality: N/A;    OMENTECTOMY N/A 10/20/2022    Procedure: OMENTECTOMY;  Surgeon: EDILMA Bonilla MD;  Location: [facility] OR 2ND FLR;  Service: Colon and Rectal;  Laterality: N/A;    RIGHT HEMICOLECTOMY N/A 10/20/2022    Procedure: HEMICOLECTOMY, RIGHT, extended;  Surgeon: EDILMA Bonilla MD;  Location: [facility] OR 2ND FLR;  Service: Colon and Rectal;  Laterality: N/A;  Extended right hemicolectomy CONSENT IN AM     Family History: No family history on file.    Social History:  reports that he has never smoked. He has never used smokeless tobacco. He reports that he does not currently use alcohol. He reports that he does not use drugs.    Allergies:  Review of patient's allergies indicates:  No Known Allergies    Medications:  Current Outpatient Medications   Medication Sig Dispense Refill    acetaminophen (TYLENOL) 500 MG tablet Take 1 tablet (500 mg total) by mouth every 6 (six) hours as needed for Pain (alternate with ibuprofen). (Patient not taking: Reported on 6/28/2023)  0    amLODIPine (NORVASC) 10 MG tablet Take 1 tablet (10 mg total) by mouth once daily. 90 tablet 3    capecitabine (XELODA) 500 MG Tab Take 3 tablets (1,500 mg) by mouth twice daily with food on days 1 thru 7 of 14 day cycles. (Patient not taking: Reported on 6/28/2023.) 84 tablet 5    ibuprofen (ADVIL,MOTRIN) 400 MG tablet Take 1 tablet (400 mg total) by mouth every 6 (six) hours as needed for Other (pain). Alternate with tylenol (Patient not taking: Reported on 6/28/2023)      ondansetron (ZOFRAN) 4 MG tablet Take 1 tablet (4 mg total) by mouth every 8 (eight) hours as needed for Nausea. (Patient not taking: Reported on 6/28/2023) 30 tablet 3    oxyCODONE (ROXICODONE) 5 MG immediate release tablet Take 1 tablet (5 mg total) by mouth every 6 (six) hours as needed for Pain. (Patient  "not taking: Reported on 6/28/2023) 20 tablet 0    tamsulosin (FLOMAX) 0.4 mg Cap Take 1 capsule (0.4 mg total) by mouth once daily. (Patient not taking: Reported on 6/28/2023) 30 capsule 5     No current facility-administered medications for this visit.     Review of Systems   Constitutional:  Positive for fatigue (improved). Negative for activity change, appetite change, chills, diaphoresis, fever and unexpected weight change.   HENT:  Negative for congestion, mouth sores, nosebleeds, postnasal drip, rhinorrhea, trouble swallowing and voice change.    Eyes:  Negative for pain and visual disturbance.   Respiratory:  Negative for cough, chest tightness, shortness of breath and wheezing.    Cardiovascular:  Negative for chest pain, palpitations and leg swelling.   Gastrointestinal:  Negative for abdominal distention, abdominal pain, blood in stool, constipation, diarrhea, nausea and vomiting.   Genitourinary:  Negative for difficulty urinating, dysuria, flank pain, frequency, hematuria and urgency.   Musculoskeletal:  Negative for arthralgias, back pain and myalgias.   Skin:  Negative for rash and wound.   Neurological:  Negative for dizziness, facial asymmetry, weakness, light-headedness, numbness and headaches.   Psychiatric/Behavioral:  Negative for agitation, behavioral problems, confusion, decreased concentration, dysphoric mood and sleep disturbance. The patient is not nervous/anxious.    All other systems reviewed and are negative.    ECOG Performance Status: 1     Objective:      Vitals:   Vitals:    10/03/23 0945   BP: 117/77   BP Location: Left arm   Patient Position: Sitting   BP Method: Small (Automatic)   Pulse: 81   Resp: 18   SpO2: 100%   Weight: 52 kg (114 lb 12 oz)   Height: 5' 4" (1.626 m)     Physical Exam  Vitals reviewed.   Constitutional:       General: He is not in acute distress.     Appearance: Normal appearance. He is not ill-appearing, toxic-appearing or diaphoretic.   HENT:      Head: " Normocephalic and atraumatic.      Right Ear: External ear normal.      Left Ear: External ear normal.   Eyes:      General: No scleral icterus.     Extraocular Movements: Extraocular movements intact.      Conjunctiva/sclera: Conjunctivae normal.      Pupils: Pupils are equal, round, and reactive to light.   Cardiovascular:      Rate and Rhythm: Normal rate and regular rhythm.      Pulses: Normal pulses.      Heart sounds: Normal heart sounds. No murmur heard.  Pulmonary:      Effort: Pulmonary effort is normal. No respiratory distress.      Breath sounds: Normal breath sounds. No wheezing.   Chest:      Comments: Port to RCW, no signs of infection.  Abdominal:      General: Abdomen is flat. Bowel sounds are normal. There is no distension.      Palpations: Abdomen is soft. There is no mass.      Tenderness: There is no abdominal tenderness.      Comments: Vertical midline abdominal wound well healed   Musculoskeletal:         General: No swelling or deformity. Normal range of motion.      Right lower leg: No edema.      Left lower leg: No edema.   Skin:     Coloration: Skin is not jaundiced or pale.      Findings: No bruising, erythema or rash.   Neurological:      General: No focal deficit present.      Mental Status: He is alert and oriented to person, place, and time. Mental status is at baseline.      Cranial Nerves: No cranial nerve deficit.      Sensory: No sensory deficit.      Gait: Gait normal.   Psychiatric:         Mood and Affect: Mood normal.         Behavior: Behavior normal.         Thought Content: Thought content normal.         Judgment: Judgment normal.     Laboratory Data:  Lab Visit on 10/03/2023   Component Date Value Ref Range Status    Sodium 10/03/2023 138  136 - 145 mmol/L Final    Potassium 10/03/2023 3.9  3.5 - 5.1 mmol/L Final    Chloride 10/03/2023 107  95 - 110 mmol/L Final    CO2 10/03/2023 25  23 - 29 mmol/L Final    Glucose 10/03/2023 97  70 - 110 mg/dL Final    BUN 10/03/2023 10   8 - 23 mg/dL Final    Creatinine 10/03/2023 0.8  0.5 - 1.4 mg/dL Final    Calcium 10/03/2023 8.8  8.7 - 10.5 mg/dL Final    Total Protein 10/03/2023 6.9  6.0 - 8.4 g/dL Final    Albumin 10/03/2023 3.0 (L)  3.5 - 5.2 g/dL Final    Total Bilirubin 10/03/2023 0.6  0.1 - 1.0 mg/dL Final    Comment: For infants and newborns, interpretation of results should be based  on gestational age, weight and in agreement with clinical  observations.    Premature Infant recommended reference ranges:  Up to 24 hours.............<8.0 mg/dL  Up to 48 hours............<12.0 mg/dL  3-5 days..................<15.0 mg/dL  6-29 days.................<15.0 mg/dL      Alkaline Phosphatase 10/03/2023 178 (H)  55 - 135 U/L Final    AST 10/03/2023 26  10 - 40 U/L Final    ALT 10/03/2023 14  10 - 44 U/L Final    eGFR 10/03/2023 >60.0  >60 mL/min/1.73 m^2 Final    Anion Gap 10/03/2023 6 (L)  8 - 16 mmol/L Final    CEA 10/03/2023 11.0 (H)  0.0 - 5.0 ng/mL Final    Comment: CEA Normal Range:  Non-Smokers: 0-3.0 ng/mL  Smokers:     0-5.0 ng/mL    The testing method is a chemiluminescent microparticle immunoassay   manufactured by [employer] and performed on the [employer]   or   Numerate system. Values obtained with different assay manufacturers   for   methods may be different and cannot be used interchangeably.     Lab Visit on 10/03/2023   Component Date Value Ref Range Status    Specimen UA 10/03/2023 Urine, Unspecified   Final    Color, UA 10/03/2023 Yellow  Yellow, Straw, [place] Final    Appearance, UA 10/03/2023 Clear  Clear Final    pH, UA 10/03/2023 7.0  5.0 - 8.0 Final    Specific Gravity, UA 10/03/2023 1.020  1.005 - 1.030 Final    Protein, UA 10/03/2023 Negative  Negative Final    Comment: Recommend a 24 hour urine protein or a urine   protein/creatinine ratio if globulin induced proteinuria is  clinically suspected.      Glucose, UA 10/03/2023 Negative  Negative Final    Ketones, UA 10/03/2023 Negative  Negative Final     Bilirubin (UA) 10/03/2023 Negative  Negative Final    Occult Blood UA 10/03/2023 Negative  Negative Final    Nitrite, UA 10/03/2023 Negative  Negative Final    Leukocytes, UA 10/03/2023 Negative  Negative Final     Assessment and Plan        1. 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 Immunodeficiency secondary to neoplasm    4. Primary hypertension    5. MARIA A (acute kidney injury)    6. Anemia associated with chemotherapy    7. Pain    8. Drug-induced constipation    9. Weight decrease    10. Severe malnutrition    11. Lower urinary tract symptoms (LUTS)        1-3.  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  Guardant 360 was negative for BRAF, VIRI, HER2 amplification.   Pretreatment CEA 57.  We had a long and sonia discussion with him about his diagnosis. Unfortunately, the disease is not curable but remains treatable and he has good performance status.   Restaging scans after 7 cycles of FOLFOX + Pema showed significant reduction in colonic mass and liver mass.   we switched to maintenance as of cycle 8 with 5FU + Pema  Restaging scans from March 2022 show stable disease.   MRI on 7/18/22 showing hepatic progression of disease in the right hepatic lobe noted on the exam. CT CAP on 8/26 shows some mild progression in both liver metastases.    Discussed case at colorectal tumor board 8/31/22 and had a diagnostic lap performed by Dr. Rodriguez on 9/7 to assess for any occult peritoneal disease. Findings from the diagnostic lap were negative. Fluid from around from around the primary mass was sent for cytology that was negative for malignancy.   Underwent primary tumor resection on 10/20/22 with Dr. Bonilla.    Meanwhile his liver mets had grown.  We discussed his case at [facility] conference with plans for short term Y-90 and then restarting chemotherapy prior to considering any surgical options to his liver metastases.  He underwent Y-90 delivery on  12/30/22. Tolerated well.   CT CAP on 1/23/23 reviewed at CRC conference with good response to Y-90, no new lesions.  Underwent second Y-90 on 1/27/23. Can consider R hepatectomy pending follow-up imaging after Y-90.     Received cycle 42 of FOLFOX (omitted oxaliplatin again starting cycle 41 due to neuropathy) on 2/9/23.  Because of 5-FU shortage nationally, we have been holding chemotherapy since mid-February 2023.  If he needs to restart chemotherapy (I.e. no plans for surgical resection), will place him on capecitabine maintenance for duration of 5-FU shortage.     Discussed at colorectal liver mets tumor board 3/16/23.  Plan for repeat Y-90 and then consideration of surgical resection and he will meet with liver transplant team as well.  Underwent Y-90 delivery 5/17/23 with IR.     Has been on maintenance Xeloda since late April 2023.    Met with liver transplant team but ultimately opted not to proceed with transplant as he was concerned about how he would tolerate a major surgery with the life changes that would come after transplant as well. They closed out his case.    He met with Dr. Rodriguez to discuss whether surgical resection is indicated for his liver mets.  He recommended repeat MRI.  Repeat MRI unfortunately showed disease progression while on Xeloda maintenance.  Similarly his CEA garrett precipitously, all in keeping with worsening disease.    We recommended we restart IV chemotherapy with FOLFIRI + Avastin (never had irinotecan).    Because of hyperbilirubinemia he received cycle 1 with just 5-FU + Avastin on 7/11/23. Tolerated this well. Bilirubin has improved.  Received irinotecan starting with cycle 2.  Repeat CT CAP after cycle 4 shows good response to therapy.   Excellent tolerance. [facility] tumor board agrees with continuation of chemotherapy.     CBC pending but CMP reviewed and acceptable to proceed with cycle 7 today pending CBC results. CEA continues to downtrend.    -RTC in 2 weeks for next  cycle. Will repeat imaging studies after cycle 8.    Tempus: APC, CKS1B cng, ERCC3 cnl, PIK3CA; KENIA, TMB 12.1.    4. Hypertension   -- BP normal today. Feels well.   -- taking Amlodipine .   -- Following with PCP.   -- encouraged him and his daughter to monitor BP and HR closely at home.     5. MARIA A  -- Resolved. Cr normal.   -- Monitor. Encouraged continued hydration particularly with working outside.     6. Anemia  -- Hgb stable.  Monitor.  -- No signs of bleeding. Platelets normal.     7-10. Neoplasm related pain, weight loss, constipation  -- Pain very well controlled.  Has oxycodone 5mg to use PRN but not requiring now.  -- Following with nutrition. Encouraged increased protein. Monitor.  -- Continue Miralax daily for constipation.    11. Urinary hesitancy  -- Continue Flomax.  -- Reports improvement since starting medication.     Patient is in agreement with the proposed treatment plan. All questions were answered to the patient's satisfaction. Pt knows to call clinic if anything is needed before the next clinic visit.    Bunny Schuster MD  Hematology/Oncology  [facility]      Route Chart for Scheduling    Med Onc Chart Routing      Follow up with physician 4 weeks. for FOLFIRI + AVastin; CT prior to visit   Follow up with RACHEL 2 weeks. for FOLFIRI + AVastin   Infusion scheduling note    Injection scheduling note    Labs CBC, CMP, CEA and urinalysis   Scheduling:  Preferred lab:  Lab interval: every 2 weeks     Imaging CT chest abdomen pelvis   prior to f/u in 4 weeks   Pharmacy appointment    Other referrals

## 2023-10-03 NOTE — PLAN OF CARE
1502- Portable pump infusing on discharge all clamps open and connections secured. No kinks in tubing noted. Settings double checked by MACARENA Huff RN. Educated patient to be sure pump should say run and volume infused number should increase daily as reservoir volume decreases. Instructed to call with any issues with pump. Phone numbers given. AVS given.  Instructed to call provider for any questions or concerns. Patient will return 1245 for pump dc on Thursday.

## 2023-10-03 NOTE — PLAN OF CARE
1101-Labs , hx, and medications reviewed, patient was seen by MD prior to arrival. Assessment completed. Discussed plan of care with patient. Patient in agreement. Chair reclined and warm blanket and snack offered.

## 2023-10-05 ENCOUNTER — INFUSION (OUTPATIENT)
Dept: INFUSION THERAPY | Facility: HOSPITAL | Age: 73
End: 2023-10-05
Payer: MEDICARE

## 2023-10-05 VITALS — SYSTOLIC BLOOD PRESSURE: 104 MMHG | DIASTOLIC BLOOD PRESSURE: 63 MMHG | HEART RATE: 82 BPM

## 2023-10-05 DIAGNOSIS — C18.9 METASTATIC COLON CANCER TO LIVER: Primary | ICD-10-CM

## 2023-10-05 DIAGNOSIS — C78.7 METASTATIC COLON CANCER TO LIVER: Primary | ICD-10-CM

## 2023-10-05 PROCEDURE — A4216 STERILE WATER/SALINE, 10 ML: HCPCS | Performed by: INTERNAL MEDICINE

## 2023-10-05 PROCEDURE — 63600175 PHARM REV CODE 636 W HCPCS: Performed by: INTERNAL MEDICINE

## 2023-10-05 PROCEDURE — 25000003 PHARM REV CODE 250: Performed by: INTERNAL MEDICINE

## 2023-10-05 RX ORDER — HEPARIN 100 UNIT/ML
500 SYRINGE INTRAVENOUS
Status: DISCONTINUED | OUTPATIENT
Start: 2023-10-05 | End: 2023-10-05 | Stop reason: HOSPADM

## 2023-10-05 RX ORDER — SODIUM CHLORIDE 0.9 % (FLUSH) 0.9 %
10 SYRINGE (ML) INJECTION
Status: DISCONTINUED | OUTPATIENT
Start: 2023-10-05 | End: 2023-10-05 | Stop reason: HOSPADM

## 2023-10-05 RX ADMIN — Medication 10 ML: at 01:10

## 2023-10-05 RX ADMIN — HEPARIN 500 UNITS: 100 SYRINGE at 01:10

## 2023-10-05 NOTE — PLAN OF CARE
1315 Pt tolerated home infusion well, no complaints or complications reported, VSS, vessel empty upon arrival. Pt disconnected from pump, positive blood return noted. Pt aware to call provider with any questions or concerns, aware of upcoming appts, ambulatory from clinic with steady gait, no distress noted.     Individual Follow-Up Form    3/1/2023    Quit Date: 9/29/23    Clinical Status of Patient: Outpatient    Length of Service: 60 minutes    Continuing Medication: no    Other Medications: none     Target Symptoms: Withdrawal and medication side effects. The following were  rated moderate (3) to severe (4) on TCRS:  Moderate (3): none  Severe (4): none    Comments: Patient was an active participant in group discussions. Patient remains quit as of 9/29/23. Commended patient for this continued quit. He is off all cessation medications however he is back in the program to possibly get back on medications. Commended him for reaching out to me to get back into the program. He is determined to stay quit but states his temper is shorter than ever and his wife is getting aggravated with him. He will be meeting with his dr tomorrow 3/1/23 to discuss medication. Session Focus:  orientation, client introductions, completion of TCRS (Tobacco Cessation Rating Scale) learned addiction model, cues/triggers, personal reasons for quitting, medications, goals, quit date 9/29/23. The patient denies any abnormal behavioral or mental changes at this time. The patient will continue with  therapy sessions and medication monitoring by CTTS. Prescribed medication management will be by physician.      Diagnosis: F17.210    Next Visit: 1 week

## 2023-10-08 NOTE — PROGRESS NOTES
"[facility]  [facility]  Hematology/Medical Oncology Clinic     PATIENT: Javier Downs  MRN: [id number]  DATE: 5/30/2023    Diagnosis: Transverse colon metastatic cancer to the liver     Oncological history:   04/20/2021: metastatic colon cancer to the liver, moderately differentiated, pMMR  05/06/2021: C1 mFOLFOX + Pema  05/20/2021: C2 mFOLFOX + Pema  06/03/2021: C3 mFOLFOX + Pema   06/17/2021: C4 mFOLFOX + Pema  07/01/2021: C5 mFOLFOX + Pema  07/15/2021: C6 mFOLFOX + Pema  Restaging CT CAP: significant improvement of lesions   07/29/2021: C7 mFOLFOX + Pema   08/12/2021: Switched to maintenance  5FU+Pema (C8)  06/23/2022: C30 maintenance 5FU+Pema  10/20/2022: R hemicolectomy with Dr. Bonilla  12/30/2022: Y-90 to R liver  1/27/2023: Y-90 to R liver    Oncological History copied from medical chart:   Mr. Downs is a 72 y.o. male   70 years old pleasant AA gentleman, with history of hypertension, presenting with two weeks duration of abdominal cramps, diarrhea, nausea and vomiting. His symptoms started gradually with intermittent generalize crampy abdominal pain, worse with food. That was associated with nausea, reflux and occaisonal vomiting. He tried pepto-bismol and imodium with no relief, and hence he presented to ER.   He lost significant amount of weight, but cannot quantify the amount or the time period. He has also been feeling weaker than usual.   He hasn't seen a healthcare provider in over than 10 years. He has never had a colonoscopy.   In the ER. His labs were significant of microcytic anemia of 9.7 g/dl, MCV 77, and Platelets counts of 495. CT of the abdomen and pelvis showed " Large mass in the transverse colon highly suspicious for adenocarcinoma resulting in at least high-grade partial obstruction with dilation of proximal colon and small bowel. Soft tissue nodules in the adjacent mesentery are suspicious for pily metastases and/or mesenteric implants.  Numerous hepatic masses " Holding Lipitor 20 mg daily given LFTs "measuring up to 11.2 cm most likely related to metastatic disease.  There also several small subcapsular lesions which could reflect additional metastatic disease"   CT chest was negative to metastatic disease.   He underwent colonoscopy and stent placement. 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 HER 2 negative, VIRI/SHERI NGS was cancelled due to insufficient quantity   Guardant 360 was sent, negative for KRAS, NRAS, BRAF     He started palliative chemotherapy with FOLFOX+Pema     Interval History:   He presents today with his daughter and sister for follow-up.  He started Xeloda on 4/24/23.  He has tolerated this very well without significant fatigue, nausea, diarrhea or other side effects.  He has mild dryness in his hands and feet without peeling. He feels improved energy since being on Xeloda. Underwent Y-90 on 5/17/23.  Tolerated well without pain or fatigue.  He did meet with the transplant team, Dr. Cutler, but ultimately decided that he did not want to proceed with transplant plan.  He is feeling well without pain at this time.    ECOG status is 1.       Past Medical History:   Past Medical History:   Diagnosis Date    MARIA A (acute kidney injury) 8/18/2022    Hypertension     Metastatic colon cancer to liver 4/22/2021       Past Surgical HIstory:   Past Surgical History:   Procedure Laterality Date    COLONOSCOPY N/A 4/20/2021    Procedure: COLONOSCOPY with possible stent;  Surgeon: EDILMA Bonilla MD;  [facility]: [facility] ([address] [place] [address]);  Service: Colon and Rectal;  Laterality: N/A;    DIAGNOSTIC LAPAROSCOPY N/A 9/7/2022    Procedure: LAPAROSCOPY, DIAGNOSTIC;  Surgeon: Adrian Rodriguez MD;  [address];  Service: General;  Laterality: N/A;    INSERTION OF TUNNELED CENTRAL VENOUS CATHETER (CVC) WITH SUBCUTANEOUS PORT N/A 5/3/2021    Procedure: " [id number];  Surgeon: Francisco Layton MD;  Location: [facility];  Service: Vascular;  Laterality: N/A;    OMENTECTOMY N/A 10/20/2022    Procedure: OMENTECTOMY;  Surgeon: EDILMA Bonilla MD;  Location: [facility] OR 2ND FLR;  Service: Colon and Rectal;  Laterality: N/A;    RIGHT HEMICOLECTOMY N/A 10/20/2022    Procedure: HEMICOLECTOMY, RIGHT, extended;  Surgeon: EDILMA Bonilla MD;  Location: [facility] OR 2ND FLR;  Service: Colon and Rectal;  Laterality: N/A;  Extended right hemicolectomy CONSENT IN AM       Family History: No family history on file.    Social History:  reports that he has never smoked. He has never used smokeless tobacco. He reports that he does not currently use alcohol. He reports that he does not use drugs.    Allergies:  Review of patient's allergies indicates:  No Known Allergies    Medications:  Current Outpatient Medications   Medication Sig Dispense Refill    acetaminophen (TYLENOL) 500 MG tablet Take 1 tablet (500 mg total) by mouth every 6 (six) hours as needed for Pain (alternate with ibuprofen). (Patient not taking: Reported on 3/8/2023)  0    amLODIPine (NORVASC) 10 MG tablet Take 1 tablet (10 mg total) by mouth once daily. 90 tablet 3    capecitabine (XELODA) 500 MG Tab Take 3 tablets (1,500 mg) by mouth twice daily with food on days 1 thru 7 of 14 day cycles. 84 tablet 5    ibuprofen (ADVIL,MOTRIN) 400 MG tablet Take 1 tablet (400 mg total) by mouth every 6 (six) hours as needed for Other (pain). Alternate with tylenol      ondansetron (ZOFRAN) 4 MG tablet Take 1 tablet (4 mg total) by mouth every 8 (eight) hours as needed for Nausea. 30 tablet 3    oxyCODONE (ROXICODONE) 5 MG immediate release tablet Take 1 tablet (5 mg total) by mouth every 6 (six) hours as needed for Pain. (Patient not taking: Reported on 3/8/2023) 20 tablet 0    tamsulosin (FLOMAX) 0.4 mg Cap Take 1 capsule (0.4 mg total) by mouth once daily. (Patient not taking: Reported on 4/3/2023) 30 capsule 5     No  "current facility-administered medications for this visit.       Review of Systems   Constitutional:  Positive for fatigue. Negative for activity change, appetite change, chills, diaphoresis, fever and unexpected weight change.   HENT:  Negative for congestion, mouth sores, nosebleeds, postnasal drip, rhinorrhea, trouble swallowing and voice change.    Eyes:  Negative for pain and visual disturbance.   Respiratory:  Negative for cough, chest tightness, shortness of breath and wheezing.    Cardiovascular:  Negative for chest pain, palpitations and leg swelling.   Gastrointestinal:  Negative for abdominal distention, abdominal pain, blood in stool, constipation, diarrhea, nausea and vomiting.   Genitourinary:  Negative for difficulty urinating, dysuria, flank pain, frequency, hematuria and urgency.   Musculoskeletal:  Negative for arthralgias, back pain and myalgias.   Skin:  Negative for rash and wound.   Neurological:  Positive for numbness. Negative for dizziness, facial asymmetry, weakness, light-headedness and headaches.   Psychiatric/Behavioral:  Negative for agitation, behavioral problems, confusion, decreased concentration, dysphoric mood and sleep disturbance. The patient is not nervous/anxious.    All other systems reviewed and are negative.    ECOG Performance Status: 1     Objective:      Vitals:   Vitals:    05/30/23 0802   BP: 110/66   BP Location: Left arm   Patient Position: Sitting   BP Method: Medium (Automatic)   Pulse: 76   Resp: 18   Temp: 97.6 °F (36.4 °C)   TempSrc: Oral   SpO2: 100%   Weight: 53.9 kg (118 lb 15 oz)   Height: 5' 7" (1.702 m)           Physical Exam  Vitals reviewed.   Constitutional:       General: He is not in acute distress.     Appearance: Normal appearance. He is not ill-appearing, toxic-appearing or diaphoretic.   HENT:      Head: Normocephalic and atraumatic.      Right Ear: External ear normal.      Left Ear: External ear normal.   Eyes:      General: No scleral icterus.   "   Extraocular Movements: Extraocular movements intact.      Conjunctiva/sclera: Conjunctivae normal.      Pupils: Pupils are equal, round, and reactive to light.   Cardiovascular:      Rate and Rhythm: Normal rate and regular rhythm.      Pulses: Normal pulses.      Heart sounds: Normal heart sounds. No murmur heard.  Pulmonary:      Effort: Pulmonary effort is normal. No respiratory distress.      Breath sounds: Normal breath sounds. No wheezing.   Chest:      Comments: Port to RCW, no signs of infection.  Abdominal:      General: Abdomen is flat. Bowel sounds are normal. There is no distension.      Palpations: Abdomen is soft. There is no mass.      Tenderness: There is no abdominal tenderness.      Comments: Vertical midline abdominal wound well healed   Musculoskeletal:         General: No swelling or deformity. Normal range of motion.      Right lower leg: No edema.      Left lower leg: No edema.   Skin:     Coloration: Skin is not jaundiced or pale.      Findings: No bruising, erythema or rash.   Neurological:      General: No focal deficit present.      Mental Status: He is alert and oriented to person, place, and time. Mental status is at baseline.      Cranial Nerves: No cranial nerve deficit.      Sensory: No sensory deficit.      Gait: Gait normal.   Psychiatric:         Mood and Affect: Mood normal.         Behavior: Behavior normal.         Thought Content: Thought content normal.         Judgment: Judgment normal.     Laboratory Data:  No visits with results within 1 Week(s) from this visit.   Latest known visit with results is:   Lab Visit on 05/17/2023   Component Date Value Ref Range Status    WBC 05/17/2023 5.62  3.90 - 12.70 K/uL Final    RBC 05/17/2023 4.31 (L)  4.60 - 6.20 M/uL Final    Hemoglobin 05/17/2023 10.2 (L)  14.0 - 18.0 g/dL Final    Hematocrit 05/17/2023 35.0 (L)  40.0 - 54.0 % Final    MCV 05/17/2023 81 (L)  82 - 98 fL Final    MCH 05/17/2023 23.7 (L)  27.0 - 31.0 pg Final    MCHC  05/17/2023 29.1 (L)  32.0 - 36.0 g/dL Final    RDW 05/17/2023 18.7 (H)  11.5 - 14.5 % Final    Platelets 05/17/2023 250  150 - 450 K/uL Final    MPV 05/17/2023 9.5  9.2 - 12.9 fL Final    Immature Granulocytes 05/17/2023 0.2  0.0 - 0.5 % Final    Gran # (ANC) 05/17/2023 2.7  1.8 - 7.7 K/uL Final    Immature Grans (Abs) 05/17/2023 0.01  0.00 - 0.04 K/uL Final    Comment: Mild elevation in immature granulocytes is non specific and   can be seen in a variety of conditions including stress response,   acute inflammation, trauma and pregnancy. Correlation with other   laboratory and clinical findings is essential.      Lymph # 05/17/2023 1.7  1.0 - 4.8 K/uL Final    Mono # 05/17/2023 0.8  0.3 - 1.0 K/uL Final    Eos # 05/17/2023 0.5  0.0 - 0.5 K/uL Final    Baso # 05/17/2023 0.03  0.00 - 0.20 K/uL Final    nRBC 05/17/2023 0  0 /100 WBC Final    Gran % 05/17/2023 47.7  38.0 - 73.0 % Final    Lymph % 05/17/2023 29.9  18.0 - 48.0 % Final    Mono % 05/17/2023 13.7  4.0 - 15.0 % Final    Eosinophil % 05/17/2023 8.0  0.0 - 8.0 % Final    Basophil % 05/17/2023 0.5  0.0 - 1.9 % Final    Differential Method 05/17/2023 Automated   Final    Sodium 05/17/2023 140  136 - 145 mmol/L Final    Potassium 05/17/2023 4.4  3.5 - 5.1 mmol/L Final    Chloride 05/17/2023 106  95 - 110 mmol/L Final    CO2 05/17/2023 23  23 - 29 mmol/L Final    Glucose 05/17/2023 85  70 - 110 mg/dL Final    BUN 05/17/2023 10  8 - 23 mg/dL Final    Creatinine 05/17/2023 0.9  0.5 - 1.4 mg/dL Final    Calcium 05/17/2023 9.9  8.7 - 10.5 mg/dL Final    Total Protein 05/17/2023 7.8  6.0 - 8.4 g/dL Final    Albumin 05/17/2023 3.4 (L)  3.5 - 5.2 g/dL Final    Total Bilirubin 05/17/2023 1.0  0.1 - 1.0 mg/dL Final    Comment: For infants and newborns, interpretation of results should be based  on gestational age, weight and in agreement with clinical  observations.    Premature Infant recommended reference ranges:  Up to 24 hours.............<8.0 mg/dL  Up to 48  hours............<12.0 mg/dL  3-5 days..................<15.0 mg/dL  6-29 days.................<15.0 mg/dL      Alkaline Phosphatase 05/17/2023 177 (H)  55 - 135 U/L Final    AST 05/17/2023 32  10 - 40 U/L Final    ALT 05/17/2023 16  10 - 44 U/L Final    Anion Gap 05/17/2023 11  8 - 16 mmol/L Final    eGFR 05/17/2023 >60.0  >60 mL/min/1.73 m^2 Final    CEA 05/17/2023 47.5 (H)  0.0 - 5.0 ng/mL Final    Comment: CEA Normal Range:  Non-Smokers: 0-3.0 ng/mL  Smokers:     0-5.0 ng/mL    The testing method is a chemiluminescent microparticle immunoassay   manufactured by [employer] and performed on the [employer]   or   [employer] system. Values obtained with different assay manufacturers   for   methods may be different and cannot be used interchangeably.      Sodium 05/17/2023 140  136 - 145 mmol/L Final    Potassium 05/17/2023 4.6  3.5 - 5.1 mmol/L Final    Chloride 05/17/2023 106  95 - 110 mmol/L Final    CO2 05/17/2023 24  23 - 29 mmol/L Final    Glucose 05/17/2023 87  70 - 110 mg/dL Final    BUN 05/17/2023 10  8 - 23 mg/dL Final    Creatinine 05/17/2023 0.9  0.5 - 1.4 mg/dL Final    Calcium 05/17/2023 10.1  8.7 - 10.5 mg/dL Final    Total Protein 05/17/2023 7.8  6.0 - 8.4 g/dL Final    Albumin 05/17/2023 3.4 (L)  3.5 - 5.2 g/dL Final    Total Bilirubin 05/17/2023 1.0  0.1 - 1.0 mg/dL Final    Comment: For infants and newborns, interpretation of results should be based  on gestational age, weight and in agreement with clinical  observations.    Premature Infant recommended reference ranges:  Up to 24 hours.............<8.0 mg/dL  Up to 48 hours............<12.0 mg/dL  3-5 days..................<15.0 mg/dL  6-29 days.................<15.0 mg/dL      Alkaline Phosphatase 05/17/2023 179 (H)  55 - 135 U/L Final    AST 05/17/2023 31  10 - 40 U/L Final    ALT 05/17/2023 19  10 - 44 U/L Final    Anion Gap 05/17/2023 10  8 - 16 mmol/L Final    eGFR 05/17/2023 >60.0  >60 mL/min/1.73 m^2 Final    Bilirubin, Direct  05/17/2023 0.4 (H)  0.1 - 0.3 mg/dL Final    Prothrombin Time 05/17/2023 11.9  9.0 - 12.5 sec Final    INR 05/17/2023 1.1  0.8 - 1.2 Final    Comment: Coumadin Therapy:  2.0 - 3.0 for INR for all indicators except mechanical heart valves  and antiphospholipid syndromes which should use 2.5 - 3.5.      WBC 05/17/2023 5.62  3.90 - 12.70 K/uL Final    RBC 05/17/2023 4.31 (L)  4.60 - 6.20 M/uL Final    Hemoglobin 05/17/2023 10.2 (L)  14.0 - 18.0 g/dL Final    Hematocrit 05/17/2023 35.0 (L)  40.0 - 54.0 % Final    MCV 05/17/2023 81 (L)  82 - 98 fL Final    MCH 05/17/2023 23.7 (L)  27.0 - 31.0 pg Final    MCHC 05/17/2023 29.1 (L)  32.0 - 36.0 g/dL Final    RDW 05/17/2023 18.7 (H)  11.5 - 14.5 % Final    Platelets 05/17/2023 250  150 - 450 K/uL Final    MPV 05/17/2023 9.5  9.2 - 12.9 fL Final    Immature Granulocytes 05/17/2023 0.2  0.0 - 0.5 % Final    Gran # (ANC) 05/17/2023 2.7  1.8 - 7.7 K/uL Final    Immature Grans (Abs) 05/17/2023 0.01  0.00 - 0.04 K/uL Final    Comment: Mild elevation in immature granulocytes is non specific and   can be seen in a variety of conditions including stress response,   acute inflammation, trauma and pregnancy. Correlation with other   laboratory and clinical findings is essential.      Lymph # 05/17/2023 1.7  1.0 - 4.8 K/uL Final    Mono # 05/17/2023 0.8  0.3 - 1.0 K/uL Final    Eos # 05/17/2023 0.5  0.0 - 0.5 K/uL Final    Baso # 05/17/2023 0.03  0.00 - 0.20 K/uL Final    nRBC 05/17/2023 0  0 /100 WBC Final    Gran % 05/17/2023 47.7  38.0 - 73.0 % Final    Lymph % 05/17/2023 29.9  18.0 - 48.0 % Final    Mono % 05/17/2023 13.7  4.0 - 15.0 % Final    Eosinophil % 05/17/2023 8.0  0.0 - 8.0 % Final    Basophil % 05/17/2023 0.5  0.0 - 1.9 % Final    Differential Method 05/17/2023 Automated   Final     Assessment and Plan        1. 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 Immunodeficiency secondary to neoplasm    4. Primary hypertension    5. MARIA A  (acute kidney injury)    6. Anemia associated with chemotherapy    7. Pain    8. Drug-induced constipation    9. Weight decrease    10. Severe malnutrition    11. Lower urinary tract symptoms (LUTS)            1-3.  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  Guardant 360 was negative for BRAF, VIRI, HER2 amplification.   Pretreatment CEA 57.  We had a long and sonia discussion with him about his diagnosis. Unfortunately, the disease is not curable but remains treatable and he has good performance status.   Restaging scans after 7 cycles of FOLFOX + Pema showed significant reduction in colonic mass and liver mass.   we switched to maintenance as of cycle 8 with 5FU + Pema  Restaging scans from March 2022 show stable disease.   MRI on 7/18/22 showing hepatic progression of disease in the right hepatic lobe noted on the exam. CT CAP on 8/26 shows some mild progression in both liver metastases.    Discussed case at colorectal tumor board 8/31/22 and had a diagnostic lap performed by Dr. Rodriguez on 9/7 to assess for any occult peritoneal disease. Findings from the diagnostic lap were negative. Fluid from around from around the primary mass was sent for cytology that was negative for malignancy.   Underwent primary tumor resection on 10/20/22 with Dr. Bonilla.    Meanwhile his liver mets had grown.  We discussed his case at CRC conference with plans for short term Y-90 and then restarting chemotherapy prior to considering any surgical options to his liver metastases.  He underwent Y-90 delivery on 12/30/22. Tolerated well.   CT CAP on 1/23/23 reviewed at [facility] conference with good response to Y-90, no new lesions.  Underwent second Y-90 on 1/27/23. Can consider R hepatectomy pending follow-up imaging after Y-90.     Received cycle 42 of FOLFOX (omitted oxaliplatin again starting cycle 41 due to neuropathy) on 2/9/23.  Because of 5-FU shortage nationally, we have been holding chemotherapy  since mid-February 2023.  If he needs to restart chemotherapy (I.e. no plans for surgical resection), will place him on capecitabine maintenance for duration of 5-FU shortage.     Discussed at colorectal liver mets tumor board 3/16/23.  Plan for repeat Y-90 and then consideration of surgical resection and he will meet with liver transplant team as well.  Underwent Y-90 delivery 5/17/23 with IR.     Met with liver transplant team but ultimately opted not to proceed with transplant as he was concerned about how he would tolerate a major surgery with the life changes that would come after transplant as well. They closed out his case.    He is set to meet with Dr. Rodriguez soon to discuss whether surgical resection is indicated for his liver mets.    Meanwhile he will continue Xeloda 1500 mg BID 7 days on, 7 days off as maintenance therapy.  Tolerance has been excellent.  Monitor CEA and assess response on next MRI in about 3 weeks.    RTC in 4 weeks.    4. Hypertension   -- BP normal today.  -- taking Amlodipine .   -- Following with PCP.   -- encouraged him and his daughter to monitor BP and HR closely at home.     5. MARIA A  -- Resolved. Cr normal.   -- Monitor. Encouraged continued hydration.     6. Anemia  -- Hgb improved.  Monitor.  -- No signs of bleeding. Platelets normal.     7-10. Neoplasm related pain, weight loss, constipation  -- Pain very well controlled.  Has oxycodone 5mg to use PRN.  -- Following with nutrition. Encouraged increased protein. Had been drinking Boost daily.  Weight improving.  -- Continue Miralax daily for constipation.    11. Urinary hesitancy  -- Continue Flomax.  -- Reports improvement since starting medication.     Patient is in agreement with the proposed treatment plan. All questions were answered to the patient's satisfaction. Pt knows to call clinic if anything is needed before the next clinic visit.    Bunny Schuster MD  Hematology/Oncology  [facility]      Route Chart for Scheduling    Med Onc Chart Routing      Follow up with physician 4 weeks. RTC 6/27 AM   Follow up with RACHEL    Infusion scheduling note    Injection scheduling note    Labs    Imaging    Pharmacy appointment    Other referrals

## 2023-10-12 ENCOUNTER — HOSPITAL ENCOUNTER (OUTPATIENT)
Dept: RADIOLOGY | Facility: HOSPITAL | Age: 73
Discharge: HOME | End: 2023-10-12
Attending: INTERNAL MEDICINE
Payer: MEDICARE

## 2023-10-12 DIAGNOSIS — C78.7 METASTATIC COLON CANCER TO LIVER: ICD-10-CM

## 2023-10-12 DIAGNOSIS — C18.9 METASTATIC COLON CANCER TO LIVER: ICD-10-CM

## 2023-10-12 PROCEDURE — 74177 CT ABD & PELVIS W/CONTRAST: CPT | Mod: TC

## 2023-10-12 PROCEDURE — 71260 CT CHEST ABDOMEN PELVIS WITH CONTRAST (XPD): ICD-10-PCS | Mod: 26,,, | Performed by: RADIOLOGY

## 2023-10-12 PROCEDURE — 74177 CT CHEST ABDOMEN PELVIS WITH CONTRAST (XPD): ICD-10-PCS | Mod: 26,,, | Performed by: RADIOLOGY

## 2023-10-12 PROCEDURE — 25500020 PHARM REV CODE 255: Performed by: INTERNAL MEDICINE

## 2023-10-12 PROCEDURE — A9698 NON-RAD CONTRAST MATERIALNOC: HCPCS | Performed by: INTERNAL MEDICINE

## 2023-10-12 PROCEDURE — 71260 CT THORAX DX C+: CPT | Mod: TC

## 2023-10-12 PROCEDURE — 71260 CT THORAX DX C+: CPT | Mod: 26,,, | Performed by: RADIOLOGY

## 2023-10-12 PROCEDURE — 74177 CT ABD & PELVIS W/CONTRAST: CPT | Mod: 26,,, | Performed by: RADIOLOGY

## 2023-10-12 RX ADMIN — IOHEXOL 75 ML: 350 INJECTION, SOLUTION INTRAVENOUS at 11:10

## 2023-10-12 RX ADMIN — Medication 450 ML: at 11:10

## 2023-10-16 ENCOUNTER — OFFICE VISIT (OUTPATIENT)
Dept: HEMATOLOGY/ONCOLOGY | Facility: CLINIC | Age: 73
End: 2023-10-16
Payer: MEDICARE

## 2023-10-16 ENCOUNTER — INFUSION (OUTPATIENT)
Dept: INFUSION THERAPY | Facility: HOSPITAL | Age: 73
End: 2023-10-16
Payer: MEDICARE

## 2023-10-16 VITALS
SYSTOLIC BLOOD PRESSURE: 123 MMHG | OXYGEN SATURATION: 100 % | DIASTOLIC BLOOD PRESSURE: 71 MMHG | RESPIRATION RATE: 18 BRPM | HEART RATE: 83 BPM | BODY MASS INDEX: 19.96 KG/M2 | HEIGHT: 64 IN | WEIGHT: 116.94 LBS

## 2023-10-16 VITALS
DIASTOLIC BLOOD PRESSURE: 60 MMHG | HEART RATE: 70 BPM | TEMPERATURE: 98 F | RESPIRATION RATE: 18 BRPM | HEIGHT: 64 IN | BODY MASS INDEX: 19.96 KG/M2 | WEIGHT: 116.94 LBS | SYSTOLIC BLOOD PRESSURE: 106 MMHG

## 2023-10-16 DIAGNOSIS — I10 PRIMARY HYPERTENSION: ICD-10-CM

## 2023-10-16 DIAGNOSIS — R52 PAIN: ICD-10-CM

## 2023-10-16 DIAGNOSIS — E43 SEVERE MALNUTRITION: ICD-10-CM

## 2023-10-16 DIAGNOSIS — D84.81 IMMUNODEFICIENCY SECONDARY TO NEOPLASM: ICD-10-CM

## 2023-10-16 DIAGNOSIS — C78.7 METASTATIC COLON CANCER TO LIVER: Primary | ICD-10-CM

## 2023-10-16 DIAGNOSIS — Z79.899 IMMUNODEFICIENCY DUE TO CHEMOTHERAPY: ICD-10-CM

## 2023-10-16 DIAGNOSIS — T45.1X5A IMMUNODEFICIENCY DUE TO CHEMOTHERAPY: ICD-10-CM

## 2023-10-16 DIAGNOSIS — T45.1X5A ANEMIA ASSOCIATED WITH CHEMOTHERAPY: ICD-10-CM

## 2023-10-16 DIAGNOSIS — L02.11 CUTANEOUS ABSCESS OF NECK: ICD-10-CM

## 2023-10-16 DIAGNOSIS — R63.4 WEIGHT DECREASE: ICD-10-CM

## 2023-10-16 DIAGNOSIS — K59.03 DRUG-INDUCED CONSTIPATION: ICD-10-CM

## 2023-10-16 DIAGNOSIS — D49.9 IMMUNODEFICIENCY SECONDARY TO NEOPLASM: ICD-10-CM

## 2023-10-16 DIAGNOSIS — R39.9 LOWER URINARY TRACT SYMPTOMS (LUTS): ICD-10-CM

## 2023-10-16 DIAGNOSIS — N17.9 AKI (ACUTE KIDNEY INJURY): ICD-10-CM

## 2023-10-16 DIAGNOSIS — D64.81 ANEMIA ASSOCIATED WITH CHEMOTHERAPY: ICD-10-CM

## 2023-10-16 DIAGNOSIS — C18.9 METASTATIC COLON CANCER TO LIVER: Primary | ICD-10-CM

## 2023-10-16 DIAGNOSIS — D84.821 IMMUNODEFICIENCY DUE TO CHEMOTHERAPY: ICD-10-CM

## 2023-10-16 LAB
ERYTHROCYTE [DISTWIDTH] IN BLOOD BY AUTOMATED COUNT: 19.2 % (ref 11.5–14.5)
HCT VFR BLD AUTO: 33.9 % (ref 40–54)
HGB BLD-MCNC: 10.1 G/DL (ref 14–18)
IMM GRANULOCYTES # BLD AUTO: 0.01 K/UL (ref 0–0.04)
MCH RBC QN AUTO: 25.1 PG (ref 27–31)
MCHC RBC AUTO-ENTMCNC: 29.8 G/DL (ref 32–36)
MCV RBC AUTO: 84 FL (ref 82–98)
NEUTROPHILS # BLD AUTO: 3.7 K/UL (ref 1.8–7.7)
PLATELET # BLD AUTO: 223 K/UL (ref 150–450)
PMV BLD AUTO: 10.5 FL (ref 9.2–12.9)
RBC # BLD AUTO: 4.03 M/UL (ref 4.6–6.2)
WBC # BLD AUTO: 5.5 K/UL (ref 3.9–12.7)

## 2023-10-16 PROCEDURE — 85027 COMPLETE CBC AUTOMATED: CPT | Performed by: REGISTERED NURSE

## 2023-10-16 PROCEDURE — 1160F RVW MEDS BY RX/DR IN RCRD: CPT | Mod: CPTII,S$GLB,, | Performed by: INTERNAL MEDICINE

## 2023-10-16 PROCEDURE — 3078F PR MOST RECENT DIASTOLIC BLOOD PRESSURE < 80 MM HG: ICD-10-PCS | Mod: CPTII,S$GLB,, | Performed by: INTERNAL MEDICINE

## 2023-10-16 PROCEDURE — 1125F AMNT PAIN NOTED PAIN PRSNT: CPT | Mod: CPTII,S$GLB,, | Performed by: INTERNAL MEDICINE

## 2023-10-16 PROCEDURE — 3074F SYST BP LT 130 MM HG: CPT | Mod: CPTII,S$GLB,, | Performed by: INTERNAL MEDICINE

## 2023-10-16 PROCEDURE — 96413 CHEMO IV INFUSION 1 HR: CPT

## 2023-10-16 PROCEDURE — 25000003 PHARM REV CODE 250: Performed by: REGISTERED NURSE

## 2023-10-16 PROCEDURE — 99215 PR OFFICE/OUTPT VISIT, EST, LEVL V, 40-54 MIN: ICD-10-PCS | Mod: S$GLB,,, | Performed by: INTERNAL MEDICINE

## 2023-10-16 PROCEDURE — 96416 CHEMO PROLONG INFUSE W/PUMP: CPT

## 2023-10-16 PROCEDURE — 3288F FALL RISK ASSESSMENT DOCD: CPT | Mod: CPTII,S$GLB,, | Performed by: INTERNAL MEDICINE

## 2023-10-16 PROCEDURE — 99999 PR PBB SHADOW E&M-EST. PATIENT-LVL III: ICD-10-PCS | Mod: PBBFAC,,, | Performed by: INTERNAL MEDICINE

## 2023-10-16 PROCEDURE — 99999 PR PBB SHADOW E&M-EST. PATIENT-LVL III: CPT | Mod: PBBFAC,,, | Performed by: INTERNAL MEDICINE

## 2023-10-16 PROCEDURE — 96417 CHEMO IV INFUS EACH ADDL SEQ: CPT

## 2023-10-16 PROCEDURE — 63600175 PHARM REV CODE 636 W HCPCS: Mod: JG | Performed by: REGISTERED NURSE

## 2023-10-16 PROCEDURE — 1101F PT FALLS ASSESS-DOCD LE1/YR: CPT | Mod: CPTII,S$GLB,, | Performed by: INTERNAL MEDICINE

## 2023-10-16 PROCEDURE — 3008F PR BODY MASS INDEX (BMI) DOCUMENTED: ICD-10-PCS | Mod: CPTII,S$GLB,, | Performed by: INTERNAL MEDICINE

## 2023-10-16 PROCEDURE — 3074F PR MOST RECENT SYSTOLIC BLOOD PRESSURE < 130 MM HG: ICD-10-PCS | Mod: CPTII,S$GLB,, | Performed by: INTERNAL MEDICINE

## 2023-10-16 PROCEDURE — 1160F PR REVIEW ALL MEDS BY PRESCRIBER/CLIN PHARMACIST DOCUMENTED: ICD-10-PCS | Mod: CPTII,S$GLB,, | Performed by: INTERNAL MEDICINE

## 2023-10-16 PROCEDURE — 99215 OFFICE O/P EST HI 40 MIN: CPT | Mod: S$GLB,,, | Performed by: INTERNAL MEDICINE

## 2023-10-16 PROCEDURE — 1159F PR MEDICATION LIST DOCUMENTED IN MEDICAL RECORD: ICD-10-PCS | Mod: CPTII,S$GLB,, | Performed by: INTERNAL MEDICINE

## 2023-10-16 PROCEDURE — 3078F DIAST BP <80 MM HG: CPT | Mod: CPTII,S$GLB,, | Performed by: INTERNAL MEDICINE

## 2023-10-16 PROCEDURE — 96367 TX/PROPH/DG ADDL SEQ IV INF: CPT

## 2023-10-16 PROCEDURE — 3288F PR FALLS RISK ASSESSMENT DOCUMENTED: ICD-10-PCS | Mod: CPTII,S$GLB,, | Performed by: INTERNAL MEDICINE

## 2023-10-16 PROCEDURE — 96375 TX/PRO/DX INJ NEW DRUG ADDON: CPT

## 2023-10-16 PROCEDURE — 1125F PR PAIN SEVERITY QUANTIFIED, PAIN PRESENT: ICD-10-PCS | Mod: CPTII,S$GLB,, | Performed by: INTERNAL MEDICINE

## 2023-10-16 PROCEDURE — 1159F MED LIST DOCD IN RCRD: CPT | Mod: CPTII,S$GLB,, | Performed by: INTERNAL MEDICINE

## 2023-10-16 PROCEDURE — 1101F PR PT FALLS ASSESS DOC 0-1 FALLS W/OUT INJ PAST YR: ICD-10-PCS | Mod: CPTII,S$GLB,, | Performed by: INTERNAL MEDICINE

## 2023-10-16 PROCEDURE — 3008F BODY MASS INDEX DOCD: CPT | Mod: CPTII,S$GLB,, | Performed by: INTERNAL MEDICINE

## 2023-10-16 RX ORDER — PROCHLORPERAZINE EDISYLATE 5 MG/ML
5 INJECTION INTRAMUSCULAR; INTRAVENOUS ONCE AS NEEDED
Status: DISCONTINUED | OUTPATIENT
Start: 2023-10-16 | End: 2023-10-16 | Stop reason: HOSPADM

## 2023-10-16 RX ORDER — DIPHENHYDRAMINE HYDROCHLORIDE 50 MG/ML
50 INJECTION INTRAMUSCULAR; INTRAVENOUS ONCE AS NEEDED
Status: DISCONTINUED | OUTPATIENT
Start: 2023-10-16 | End: 2023-10-16 | Stop reason: HOSPADM

## 2023-10-16 RX ORDER — SODIUM CHLORIDE 0.9 % (FLUSH) 0.9 %
10 SYRINGE (ML) INJECTION
Status: CANCELLED | OUTPATIENT
Start: 2023-10-20

## 2023-10-16 RX ORDER — SODIUM CHLORIDE 0.9 % (FLUSH) 0.9 %
10 SYRINGE (ML) INJECTION
Status: DISCONTINUED | OUTPATIENT
Start: 2023-10-16 | End: 2023-10-16 | Stop reason: HOSPADM

## 2023-10-16 RX ORDER — EPINEPHRINE 0.3 MG/.3ML
0.3 INJECTION SUBCUTANEOUS ONCE AS NEEDED
Status: DISCONTINUED | OUTPATIENT
Start: 2023-10-16 | End: 2023-10-16 | Stop reason: HOSPADM

## 2023-10-16 RX ORDER — ATROPINE SULFATE 0.4 MG/ML
0.4 INJECTION, SOLUTION ENDOTRACHEAL; INTRAMEDULLARY; INTRAMUSCULAR; INTRAVENOUS; SUBCUTANEOUS ONCE AS NEEDED
Status: COMPLETED | OUTPATIENT
Start: 2023-10-16 | End: 2023-10-16

## 2023-10-16 RX ORDER — HEPARIN 100 UNIT/ML
500 SYRINGE INTRAVENOUS
Status: DISCONTINUED | OUTPATIENT
Start: 2023-10-16 | End: 2023-10-16 | Stop reason: HOSPADM

## 2023-10-16 RX ORDER — HEPARIN 100 UNIT/ML
500 SYRINGE INTRAVENOUS
Status: CANCELLED | OUTPATIENT
Start: 2023-10-20

## 2023-10-16 RX ADMIN — IRINOTECAN HYDROCHLORIDE 280 MG: 20 INJECTION, SOLUTION INTRAVENOUS at 12:10

## 2023-10-16 RX ADMIN — BEVACIZUMAB-AWWB 270 MG: 100 INJECTION, SOLUTION INTRAVENOUS at 11:10

## 2023-10-16 RX ADMIN — FLUOROURACIL 3840 MG: 50 INJECTION, SOLUTION INTRAVENOUS at 01:10

## 2023-10-16 RX ADMIN — SODIUM CHLORIDE: 9 INJECTION, SOLUTION INTRAVENOUS at 11:10

## 2023-10-16 RX ADMIN — DEXAMETHASONE SODIUM PHOSPHATE 0.25 MG: 4 INJECTION, SOLUTION INTRA-ARTICULAR; INTRALESIONAL; INTRAMUSCULAR; INTRAVENOUS; SOFT TISSUE at 11:10

## 2023-10-16 RX ADMIN — ATROPINE SULFATE 0.4 MG: 0.4 INJECTION, SOLUTION INTRAVENOUS at 11:10

## 2023-10-16 NOTE — PROGRESS NOTES
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 Bonilla  12/30/2022: Y-90 to R liver  1/27/2023: Y-90 to R liver  5/17/2023: Y-90 to R liver  7/11/2023: C1 FOLFIRI + Pema  7/26/2023: C2 FOLFIRI + Pema  8/8/2023: C3 FOLFIRI + Pema  8/22/23: C4 FOLFIRI + Pema  Restaging CT CAP 9/1/23: Good response to chemo.  9/7/23: C5 FOLFIRI + Pema  9/19/23: C6 FOLFIRI + Pema  10/3/23: C7 FOLFIRI + Pema  10/18/23: C8 FOLFIRI + Pmea     Interval History:   He presents today with his daughter prior to cycle 8 of FOLFIRI plus avastin. Continues to feel good overall. Energy and appetite good. No nausea, vomiting, diarrhea, or constipation. No fevers or chills. His main complaint is a painful lump to posterior neck. Initially started off with itching, but reports it has grown rapidly over the last week, slightly smaller today since rubbing alcohol on the area. No other pain.      ECOG status is 1.     Past Medical History:   Past Medical History:   Diagnosis Date    MARIA A (acute kidney injury) 8/18/2022    Hypertension     Metastatic colon cancer to liver 4/22/2021     Past Surgical HIstory:   Past Surgical History:   Procedure Laterality Date    COLONOSCOPY N/A 4/20/2021    Procedure: COLONOSCOPY with possible stent;  Surgeon: EDILMA Bonilla MD;  Location: [facility] ([address] [place] [address]);  Service:  Colon and Rectal;  Laterality: N/A;    DIAGNOSTIC LAPAROSCOPY N/A 9/7/2022    Procedure: LAPAROSCOPY, DIAGNOSTIC;  Surgeon: Adrian Rodriguez MD;  Location: [facility] OR 2ND FLR;  Service: General;  Laterality: N/A;    INSERTION OF TUNNELED CENTRAL VENOUS CATHETER (CVC) WITH SUBCUTANEOUS PORT N/A 5/3/2021    Procedure: [id number]-[id number];  Surgeon: Francisco Layton MD;  Location: [facility] OR 2ND FLR;  Service: Vascular;  Laterality: N/A;    OMENTECTOMY N/A 10/20/2022    Procedure: OMENTECTOMY;  Surgeon: EDILMA Bonilla MD;  Location: [facility] OR 2ND FLR;  Service: Colon and Rectal;  Laterality: N/A;    RIGHT HEMICOLECTOMY N/A 10/20/2022    Procedure: HEMICOLECTOMY, RIGHT, extended;  Surgeon: EDILMA Bonilla MD;  Location: [facility] OR 2ND FLR;  Service: Colon and Rectal;  Laterality: N/A;  Extended right hemicolectomy CONSENT IN AM     Family History: No family history on file.    Social History:  reports that he has never smoked. He has never used smokeless tobacco. He reports that he does not currently use alcohol. He reports that he does not use drugs.    Allergies:  Review of patient's allergies indicates:  No Known Allergies    Medications:  Current Outpatient Medications   Medication Sig Dispense Refill    acetaminophen (TYLENOL) 500 MG tablet Take 1 tablet (500 mg total) by mouth every 6 (six) hours as needed for Pain (alternate with ibuprofen). (Patient not taking: Reported on 6/28/2023)  0    amLODIPine (NORVASC) 10 MG tablet Take 1 tablet (10 mg total) by mouth once daily. 90 tablet 3    capecitabine (XELODA) 500 MG Tab Take 3 tablets (1,500 mg) by mouth twice daily with food on days 1 thru 7 of 14 day cycles. (Patient not taking: Reported on 6/28/2023.) 84 tablet 5    ibuprofen (ADVIL,MOTRIN) 400 MG tablet Take 1 tablet (400 mg total) by mouth every 6 (six) hours as needed for Other (pain). Alternate with tylenol (Patient not taking: Reported on 6/28/2023)      ondansetron (ZOFRAN) 4 MG tablet Take 1 tablet (4 mg  total) by mouth every 8 (eight) hours as needed for Nausea. (Patient not taking: Reported on 6/28/2023) 30 tablet 3    oxyCODONE (ROXICODONE) 5 MG immediate release tablet Take 1 tablet (5 mg total) by mouth every 6 (six) hours as needed for Pain. (Patient not taking: Reported on 6/28/2023) 20 tablet 0    tamsulosin (FLOMAX) 0.4 mg Cap Take 1 capsule (0.4 mg total) by mouth once daily. (Patient not taking: Reported on 6/28/2023) 30 capsule 5     No current facility-administered medications for this visit.     Review of Systems   Constitutional:  Positive for fatigue (improved). Negative for activity change, appetite change, chills, diaphoresis, fever and unexpected weight change.   HENT:  Negative for congestion, mouth sores, nosebleeds, postnasal drip, rhinorrhea, trouble swallowing and voice change.    Eyes:  Negative for pain and visual disturbance.   Respiratory:  Negative for cough, chest tightness, shortness of breath and wheezing.    Cardiovascular:  Negative for chest pain, palpitations and leg swelling.   Gastrointestinal:  Negative for abdominal distention, abdominal pain, blood in stool, constipation, diarrhea, nausea and vomiting.   Genitourinary:  Negative for difficulty urinating, dysuria, flank pain, frequency, hematuria and urgency.   Musculoskeletal:  Negative for arthralgias, back pain and myalgias.   Skin:  Negative for rash and wound.        Painful lump to posterior neck   Neurological:  Negative for dizziness, facial asymmetry, weakness, light-headedness, numbness and headaches.   Psychiatric/Behavioral:  Negative for agitation, behavioral problems, confusion, decreased concentration, dysphoric mood and sleep disturbance. The patient is not nervous/anxious.    All other systems reviewed and are negative.    ECOG Performance Status: 1     Objective:      Vitals:   Vitals:    10/16/23 0833   BP: 123/71   BP Location: Left arm   Patient Position: Sitting   BP Method: Medium (Automatic)   Pulse: 83  "  Resp: 18   SpO2: 100%   Weight: 53 kg (116 lb 15.3 oz)   Height: 5' 4" (1.626 m)       Physical Exam  Vitals reviewed.   Constitutional:       General: He is not in acute distress.     Appearance: Normal appearance. He is not ill-appearing, toxic-appearing or diaphoretic.   HENT:      Head: Normocephalic and atraumatic.      Right Ear: External ear normal.      Left Ear: External ear normal.   Eyes:      General: No scleral icterus.     Extraocular Movements: Extraocular movements intact.      Conjunctiva/sclera: Conjunctivae normal.      Pupils: Pupils are equal, round, and reactive to light.   Cardiovascular:      Rate and Rhythm: Normal rate and regular rhythm.      Pulses: Normal pulses.      Heart sounds: Normal heart sounds. No murmur heard.  Pulmonary:      Effort: Pulmonary effort is normal. No respiratory distress.      Breath sounds: Normal breath sounds. No wheezing.   Chest:      Comments: Port to RCW, no signs of infection.  Abdominal:      General: Abdomen is flat. Bowel sounds are normal. There is no distension.      Palpations: Abdomen is soft. There is no mass.      Tenderness: There is no abdominal tenderness.      Comments: Vertical midline abdominal wound well healed   Musculoskeletal:         General: No swelling or deformity. Normal range of motion.      Right lower leg: No edema.      Left lower leg: No edema.   Skin:     Coloration: Skin is not jaundiced or pale.      Findings: No bruising, erythema or rash.      Comments: Painful lump midline posterior neck, no drainage or openings to site   Neurological:      General: No focal deficit present.      Mental Status: He is alert and oriented to person, place, and time. Mental status is at baseline.      Cranial Nerves: No cranial nerve deficit.      Sensory: No sensory deficit.      Gait: Gait normal.   Psychiatric:         Mood and Affect: Mood normal.         Behavior: Behavior normal.         Thought Content: Thought content normal.        "  Judgment: Judgment normal.     Laboratory Data:  No visits with results within 1 Week(s) from this visit.   Latest known visit with results is:   Infusion on 10/03/2023   Component Date Value Ref Range Status    WBC 10/03/2023 4.59  3.90 - 12.70 K/uL Final    RBC 10/03/2023 4.00 (L)  4.60 - 6.20 M/uL Final    Hemoglobin 10/03/2023 10.0 (L)  14.0 - 18.0 g/dL Final    Hematocrit 10/03/2023 33.3 (L)  40.0 - 54.0 % Final    MCV 10/03/2023 83  82 - 98 fL Final    MCH 10/03/2023 25.0 (L)  27.0 - 31.0 pg Final    MCHC 10/03/2023 30.0 (L)  32.0 - 36.0 g/dL Final    RDW 10/03/2023 19.6 (H)  11.5 - 14.5 % Final    Platelets 10/03/2023 221  150 - 450 K/uL Final    MPV 10/03/2023 9.6  9.2 - 12.9 fL Final    Immature Granulocytes 10/03/2023 0.2  0.0 - 0.5 % Final    Gran # (ANC) 10/03/2023 2.7  1.8 - 7.7 K/uL Final    Immature Grans (Abs) 10/03/2023 0.01  0.00 - 0.04 K/uL Final    Comment: Mild elevation in immature granulocytes is non specific and   can be seen in a variety of conditions including stress response,   acute inflammation, trauma and pregnancy. Correlation with other   laboratory and clinical findings is essential.      Lymph # 10/03/2023 0.8 (L)  1.0 - 4.8 K/uL Final    Mono # 10/03/2023 0.7  0.3 - 1.0 K/uL Final    Eos # 10/03/2023 0.3  0.0 - 0.5 K/uL Final    Baso # 10/03/2023 0.03  0.00 - 0.20 K/uL Final    nRBC 10/03/2023 0  0 /100 WBC Final    Gran % 10/03/2023 59.0  38.0 - 73.0 % Final    Lymph % 10/03/2023 18.3  18.0 - 48.0 % Final    Mono % 10/03/2023 15.3 (H)  4.0 - 15.0 % Final    Eosinophil % 10/03/2023 6.5  0.0 - 8.0 % Final    Basophil % 10/03/2023 0.7  0.0 - 1.9 % Final    Differential Method 10/03/2023 Automated   Final     Assessment and Plan        1. 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 Immunodeficiency secondary to neoplasm    4. Primary hypertension    5. MARIA A (acute kidney injury)    6. Anemia associated with chemotherapy    7. Pain    8.  Drug-induced constipation    9. Weight decrease    10. Severe malnutrition    11. Lower urinary tract symptoms (LUTS)    12. Cutaneous abscess of neck      1-3.  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  Guardant 360 was negative for BRAF, VIRI, HER2 amplification.   Pretreatment CEA 57.  We had a long and sonia discussion with him about his diagnosis. Unfortunately, the disease is not curable but remains treatable and he has good performance status.   Restaging scans after 7 cycles of FOLFOX + Pema showed significant reduction in colonic mass and liver mass.   we switched to maintenance as of cycle 8 with 5FU + Pema  Restaging scans from March 2022 show stable disease.   MRI on 7/18/22 showing hepatic progression of disease in the right hepatic lobe noted on the exam. CT CAP on 8/26 shows some mild progression in both liver metastases.    Discussed case at colorectal tumor board 8/31/22 and had a diagnostic lap performed by Dr. Rodriguez on 9/7 to assess for any occult peritoneal disease. Findings from the diagnostic lap were negative. Fluid from around from around the primary mass was sent for cytology that was negative for malignancy.   Underwent primary tumor resection on 10/20/22 with Dr. Bonilla.    Meanwhile his liver mets had grown.  We discussed his case at CRC conference with plans for short term Y-90 and then restarting chemotherapy prior to considering any surgical options to his liver metastases.  He underwent Y-90 delivery on 12/30/22. Tolerated well.   CT CAP on 1/23/23 reviewed at CRC conference with good response to Y-90, no new lesions.  Underwent second Y-90 on 1/27/23. Can consider R hepatectomy pending follow-up imaging after Y-90.     Received cycle 42 of FOLFOX (omitted oxaliplatin again starting cycle 41 due to neuropathy) on 2/9/23.  Because of 5-FU shortage nationally, we have been holding chemotherapy since mid-February 2023.  If he needs to restart  chemotherapy (I.e. no plans for surgical resection), will place him on capecitabine maintenance for duration of 5-FU shortage.     Discussed at colorectal liver mets tumor board 3/16/23.  Plan for repeat Y-90 and then consideration of surgical resection and he will meet with liver transplant team as well.  Underwent Y-90 delivery 5/17/23 with IR.     Has been on maintenance Xeloda since late April 2023.    Met with liver transplant team but ultimately opted not to proceed with transplant as he was concerned about how he would tolerate a major surgery with the life changes that would come after transplant as well. They closed out his case.    He met with Dr. Rodriguez to discuss whether surgical resection is indicated for his liver mets.  He recommended repeat MRI.  Repeat MRI unfortunately showed disease progression while on Xeloda maintenance.  Similarly his CEA garrett precipitously, all in keeping with worsening disease.    We recommended we restart IV chemotherapy with FOLFIRI + Avastin (never had irinotecan).    Because of hyperbilirubinemia he received cycle 1 with just 5-FU + Avastin on 7/11/23. Tolerated this well. Bilirubin has improved.  Received irinotecan starting with cycle 2.  Repeat CT CAP after cycle 4 shows good response to therapy.   Excellent tolerance. mCRC tumor board agrees with continuation of chemotherapy.   Repeat CT CAP after cycle 7 shows stable disease, no evidence of new or worsening disease.     Labs pending from today. Will proceed with cycle 8 pending results. CEA has been downtrending.     -RTC in 2 weeks for next cycle. Will repeat imaging studies after cycle 11.    Tempus: APC, CKS1B cng, ERCC3 cnl, PIK3CA; KENIA, TMB 12.1.    4. Hypertension   -- BP normal today. Feels well.   -- taking Amlodipine .   -- Following with PCP.   -- encouraged him and his daughter to monitor BP and HR closely at home.     5. MARIA A  -- Resolved. Cr has normalized. Results pending today.   -- Monitor. Encouraged  continued hydration particularly with working outside.     6. Anemia  -- Hgb stable. Monitor.  -- No signs of bleeding. Platelets normalized. Pending today.     7-10. Neoplasm related pain, weight loss, constipation  -- Pain very well controlled. Has oxycodone 5mg to use PRN but not requiring now.  -- Following with nutrition. Encouraged increased protein. Monitor.  -- Continue Miralax daily for constipation.    11. Urinary hesitancy  -- Continue Flomax.  -- Reports improvement since starting medication.     12. Posterior neck abscess  -- Will ask surg onc if able to perform I&D in clinic when he returns on Wednesday  -- Aware to monitor for fever, chills, or other signs of infection     Patient is in agreement with the proposed treatment plan. All questions were answered to the patient's satisfaction. Pt knows to call clinic if anything is needed before the next clinic visit.    Patient discussed with and seen in conjunction with collaborating physician, Dr. Schuster.    At least 40 minutes were spent today on this encounter including face to face time with the patient, data gathering/interpretation and documentation.       Natividad Maher, MSN, APRN, ACCNS-  Hematology and Medical Oncology  Clinical Nurse Specialist to Dr. Schuster, Dr. Quijano & Dr. Mueller    Route Chart for Scheduling    Med Onc Chart Routing      Follow up with physician . Rtc in 4 weeks with labs to see Dr. Schuster for chemo   Follow up with RACHEL . Rtc in 2 weeks with labs to see RACHEL for chemo   Infusion scheduling note   chemo every 2 weeks, pump d/c on day 3   Injection scheduling note    Labs CBC, CMP, CEA and urinalysis   Scheduling:  Preferred lab:  Lab interval: every 2 weeks  **please ensure all labs orders are linked to appointments currently scheduled   Imaging    Pharmacy appointment    Other referrals

## 2023-10-16 NOTE — PLAN OF CARE
Problem: Adult Inpatient Plan of Care  Goal: Plan of Care Review  Outcome: Ongoing, Progressing   Patient tolerated treatment today. NAD noted. VSS. CADD pump infusing 5fu @ 2.2 ml/hr for 46 hrs. Verified by 2 Rns. RTC 10/18/23 @ 1200 for pump dc. Discharged home and ambulated independently off unit with family by his side.

## 2023-10-18 ENCOUNTER — INFUSION (OUTPATIENT)
Dept: INFUSION THERAPY | Facility: HOSPITAL | Age: 73
End: 2023-10-18
Payer: MEDICARE

## 2023-10-18 VITALS
WEIGHT: 116.94 LBS | HEIGHT: 64 IN | OXYGEN SATURATION: 100 % | HEART RATE: 79 BPM | TEMPERATURE: 98 F | DIASTOLIC BLOOD PRESSURE: 68 MMHG | BODY MASS INDEX: 19.96 KG/M2 | RESPIRATION RATE: 18 BRPM | SYSTOLIC BLOOD PRESSURE: 114 MMHG

## 2023-10-18 DIAGNOSIS — C18.9 METASTATIC COLON CANCER TO LIVER: Primary | ICD-10-CM

## 2023-10-18 DIAGNOSIS — C78.7 METASTATIC COLON CANCER TO LIVER: Primary | ICD-10-CM

## 2023-10-18 PROCEDURE — 63600175 PHARM REV CODE 636 W HCPCS: Performed by: REGISTERED NURSE

## 2023-10-18 RX ORDER — HEPARIN 100 UNIT/ML
500 SYRINGE INTRAVENOUS
Status: DISCONTINUED | OUTPATIENT
Start: 2023-10-18 | End: 2023-10-18 | Stop reason: HOSPADM

## 2023-10-18 RX ORDER — SODIUM CHLORIDE 0.9 % (FLUSH) 0.9 %
10 SYRINGE (ML) INJECTION
Status: DISCONTINUED | OUTPATIENT
Start: 2023-10-18 | End: 2023-10-18 | Stop reason: HOSPADM

## 2023-10-18 RX ADMIN — HEPARIN 500 UNITS: 100 SYRINGE at 12:10

## 2023-10-25 ENCOUNTER — OFFICE VISIT (OUTPATIENT)
Dept: SURGERY | Facility: CLINIC | Age: 73
End: 2023-10-25
Payer: MEDICARE

## 2023-10-25 ENCOUNTER — HOSPITAL ENCOUNTER (EMERGENCY)
Facility: HOSPITAL | Age: 73
Discharge: HOME | End: 2023-10-25
Attending: EMERGENCY MEDICINE
Payer: MEDICARE

## 2023-10-25 ENCOUNTER — HOSPITAL ENCOUNTER (EMERGENCY)
Facility: HOSPITAL | Age: 73
Discharge: HOME | End: 2023-10-25
Attending: STUDENT IN AN ORGANIZED HEALTH CARE EDUCATION/TRAINING PROGRAM
Payer: MEDICARE

## 2023-10-25 VITALS
OXYGEN SATURATION: 100 % | SYSTOLIC BLOOD PRESSURE: 124 MMHG | DIASTOLIC BLOOD PRESSURE: 83 MMHG | HEART RATE: 76 BPM | TEMPERATURE: 99 F | BODY MASS INDEX: 20.6 KG/M2 | RESPIRATION RATE: 16 BRPM | WEIGHT: 120 LBS

## 2023-10-25 VITALS
RESPIRATION RATE: 16 BRPM | OXYGEN SATURATION: 100 % | SYSTOLIC BLOOD PRESSURE: 122 MMHG | TEMPERATURE: 98 F | BODY MASS INDEX: 20.6 KG/M2 | DIASTOLIC BLOOD PRESSURE: 69 MMHG | HEART RATE: 90 BPM | WEIGHT: 120 LBS

## 2023-10-25 VITALS
HEIGHT: 64 IN | BODY MASS INDEX: 20.28 KG/M2 | SYSTOLIC BLOOD PRESSURE: 117 MMHG | WEIGHT: 118.81 LBS | RESPIRATION RATE: 18 BRPM | OXYGEN SATURATION: 96 % | HEART RATE: 79 BPM | DIASTOLIC BLOOD PRESSURE: 70 MMHG

## 2023-10-25 DIAGNOSIS — Z08 ENCOUNTER FOR FOLLOW-UP SURVEILLANCE OF COLON CANCER: Primary | ICD-10-CM

## 2023-10-25 DIAGNOSIS — Z76.89 ENCOUNTER FOR INCISION AND DRAINAGE PROCEDURE: ICD-10-CM

## 2023-10-25 DIAGNOSIS — C78.7 METASTATIC COLON CANCER TO LIVER: ICD-10-CM

## 2023-10-25 DIAGNOSIS — Z85.038 ENCOUNTER FOR FOLLOW-UP SURVEILLANCE OF COLON CANCER: Primary | ICD-10-CM

## 2023-10-25 DIAGNOSIS — L02.91 ABSCESS: Primary | ICD-10-CM

## 2023-10-25 DIAGNOSIS — C18.9 METASTATIC COLON CANCER TO LIVER: ICD-10-CM

## 2023-10-25 PROCEDURE — 1101F PT FALLS ASSESS-DOCD LE1/YR: CPT | Mod: CPTII,S$GLB,, | Performed by: COLON & RECTAL SURGERY

## 2023-10-25 PROCEDURE — 3008F PR BODY MASS INDEX (BMI) DOCUMENTED: ICD-10-PCS | Mod: CPTII,S$GLB,, | Performed by: COLON & RECTAL SURGERY

## 2023-10-25 PROCEDURE — 3288F FALL RISK ASSESSMENT DOCD: CPT | Mod: CPTII,S$GLB,, | Performed by: COLON & RECTAL SURGERY

## 2023-10-25 PROCEDURE — 3008F BODY MASS INDEX DOCD: CPT | Mod: CPTII,S$GLB,, | Performed by: COLON & RECTAL SURGERY

## 2023-10-25 PROCEDURE — 1159F PR MEDICATION LIST DOCUMENTED IN MEDICAL RECORD: ICD-10-PCS | Mod: CPTII,S$GLB,, | Performed by: COLON & RECTAL SURGERY

## 2023-10-25 PROCEDURE — 3078F DIAST BP <80 MM HG: CPT | Mod: CPTII,S$GLB,, | Performed by: COLON & RECTAL SURGERY

## 2023-10-25 PROCEDURE — 99214 PR OFFICE/OUTPT VISIT, EST, LEVL IV, 30-39 MIN: ICD-10-PCS | Mod: S$GLB,,, | Performed by: COLON & RECTAL SURGERY

## 2023-10-25 PROCEDURE — 99214 OFFICE O/P EST MOD 30 MIN: CPT | Mod: S$GLB,,, | Performed by: COLON & RECTAL SURGERY

## 2023-10-25 PROCEDURE — 87070 CULTURE OTHR SPECIMN AEROBIC: CPT | Performed by: PHYSICIAN ASSISTANT

## 2023-10-25 PROCEDURE — 25000003 PHARM REV CODE 250: Performed by: PHYSICIAN ASSISTANT

## 2023-10-25 PROCEDURE — 3074F SYST BP LT 130 MM HG: CPT | Mod: CPTII,S$GLB,, | Performed by: COLON & RECTAL SURGERY

## 2023-10-25 PROCEDURE — 3078F PR MOST RECENT DIASTOLIC BLOOD PRESSURE < 80 MM HG: ICD-10-PCS | Mod: CPTII,S$GLB,, | Performed by: COLON & RECTAL SURGERY

## 2023-10-25 PROCEDURE — 99999 PR PBB SHADOW E&M-EST. PATIENT-LVL III: CPT | Mod: PBBFAC,,, | Performed by: COLON & RECTAL SURGERY

## 2023-10-25 PROCEDURE — 1125F AMNT PAIN NOTED PAIN PRSNT: CPT | Mod: CPTII,S$GLB,, | Performed by: COLON & RECTAL SURGERY

## 2023-10-25 PROCEDURE — 3074F PR MOST RECENT SYSTOLIC BLOOD PRESSURE < 130 MM HG: ICD-10-PCS | Mod: CPTII,S$GLB,, | Performed by: COLON & RECTAL SURGERY

## 2023-10-25 PROCEDURE — 1159F MED LIST DOCD IN RCRD: CPT | Mod: CPTII,S$GLB,, | Performed by: COLON & RECTAL SURGERY

## 2023-10-25 PROCEDURE — 1125F PR PAIN SEVERITY QUANTIFIED, PAIN PRESENT: ICD-10-PCS | Mod: CPTII,S$GLB,, | Performed by: COLON & RECTAL SURGERY

## 2023-10-25 PROCEDURE — 99283 EMERGENCY DEPT VISIT LOW MDM: CPT | Mod: 25

## 2023-10-25 PROCEDURE — 10060 I&D ABSCESS SIMPLE/SINGLE: CPT

## 2023-10-25 PROCEDURE — 3288F PR FALLS RISK ASSESSMENT DOCUMENTED: ICD-10-PCS | Mod: CPTII,S$GLB,, | Performed by: COLON & RECTAL SURGERY

## 2023-10-25 PROCEDURE — 1101F PR PT FALLS ASSESS DOC 0-1 FALLS W/OUT INJ PAST YR: ICD-10-PCS | Mod: CPTII,S$GLB,, | Performed by: COLON & RECTAL SURGERY

## 2023-10-25 PROCEDURE — 99999 PR PBB SHADOW E&M-EST. PATIENT-LVL III: ICD-10-PCS | Mod: PBBFAC,,, | Performed by: COLON & RECTAL SURGERY

## 2023-10-25 PROCEDURE — 99283 EMERGENCY DEPT VISIT LOW MDM: CPT | Mod: 25,27

## 2023-10-25 RX ORDER — MORPHINE SULFATE 15 MG/1
15 TABLET ORAL
Status: DISCONTINUED | OUTPATIENT
Start: 2023-10-25 | End: 2023-10-25 | Stop reason: HOSPADM

## 2023-10-25 RX ORDER — SULFAMETHOXAZOLE AND TRIMETHOPRIM 800; 160 MG/1; MG/1
1 TABLET ORAL 2 TIMES DAILY
Qty: 14 TABLET | Refills: 0 | Status: SHIPPED | OUTPATIENT
Start: 2023-10-25 | End: 2023-11-01

## 2023-10-25 RX ORDER — LIDOCAINE HYDROCHLORIDE 10 MG/ML
10 INJECTION INFILTRATION; PERINEURAL
Status: COMPLETED | OUTPATIENT
Start: 2023-10-25 | End: 2023-10-25

## 2023-10-25 RX ORDER — ONDANSETRON 4 MG/1
4 TABLET, ORALLY DISINTEGRATING ORAL
Status: DISCONTINUED | OUTPATIENT
Start: 2023-10-25 | End: 2023-10-25 | Stop reason: HOSPADM

## 2023-10-25 RX ORDER — LIDOCAINE HYDROCHLORIDE 10 MG/ML
10 INJECTION INFILTRATION; PERINEURAL
Status: DISCONTINUED | OUTPATIENT
Start: 2023-10-25 | End: 2023-10-25 | Stop reason: HOSPADM

## 2023-10-25 RX ADMIN — LIDOCAINE HYDROCHLORIDE 10 ML: 10 INJECTION, SOLUTION INFILTRATION; PERINEURAL at 06:10

## 2023-10-25 NOTE — PROGRESS NOTES
HPI:  Javier Downs is a 73 y.o. male with history of hepatic flexure cancer, metastatic to liver     10-  right colectomy, omentectomy     Interval History 10/25/23: Presents for 6 month follow up. Doing well. No new complaints. No changes in bowel habits. Has 2 formed stools/day. No blood. Has gained some weight. Eating/drinking well.     Was found to have progression of liver disease after y90 treatments in June. He was started on systemic chemotherapy (FOLFIRI + Avastin). He received cycle 8 10/18.     Past Medical History:   Diagnosis Date    MARIA A (acute kidney injury) 8/18/2022    Hypertension     Metastatic colon cancer to liver 4/22/2021        Past Surgical History:   Procedure Laterality Date    COLONOSCOPY N/A 4/20/2021    Procedure: COLONOSCOPY with possible stent;  Surgeon: EDILMA Bonilla MD;  [facility]  Service: Colon and Rectal;  Laterality: N/A;    DIAGNOSTIC LAPAROSCOPY N/A 9/7/2022    Procedure: LAPAROSCOPY, DIAGNOSTIC;  Surgeon: Adrian Rodriguez MD;  [facility];  Service: General;  Laterality: N/A;    INSERTION OF TUNNELED CENTRAL VENOUS CATHETER (CVC) WITH SUBCUTANEOUS PORT N/A 5/3/2021    Procedure: [id number];  Surgeon: Francisco Layton MD;  [facility];  Service: Vascular;  Laterality: N/A;    OMENTECTOMY N/A 10/20/2022    Procedure: OMENTECTOMY;  Surgeon: EDILMA Bonilla MD;  [facility];  Service: Colon and Rectal;  Laterality: N/A;    RIGHT HEMICOLECTOMY N/A 10/20/2022    Procedure: HEMICOLECTOMY, RIGHT, extended;  Surgeon: EDILMA Bonilla MD;  [facility];  Service: Colon and Rectal;  Laterality: N/A;  Extended right hemicolectomy CONSENT IN AM       Review of patient's allergies indicates:  No Known Allergies    No family history on file.    Social History     Socioeconomic History    Marital status:    Tobacco Use    Smoking status: Never    Smokeless tobacco: Never   Substance and  Sexual Activity    Alcohol use: Not Currently    Drug use: Never    Sexual activity: Not Currently     Partners: Female       ROS:  GENERAL: No fever, chills, fatigability or weight loss.  Integument: No rashes, redness, icterus  CHEST: Denies VARGAS, cyanosis, wheezing, cough and sputum production.  CARDIOVASCULAR: Denies chest pain, PND, orthopnea or reduced exercise tolerance.  GI: Denies abd pain, dysphagia, nausea, vomiting, no hematemesis   : Denies burning on urination, no hematuria, no bacteriuria  MSK: No deformities, swelling, joint pain swelling  Neurologic: No HAs, seizures, weakness, paresthesias, gait problems    PE:  General appearance NAD  There were no vitals taken for this visit.  Sclera/ Skin non icteric  LN nonpalpable  AT NC EOMI  Neck supple trachea midline   Chest symmetric, nl excursion, no retractions, breathing comfortably  Abdomen  ND soft NT.  no masses, no organomegaly  EXT - no CCE  Neuro:  Mood/ affect nl, alert and oriented x 3, moves all ext's, gait nl    Assessment:  73 y.o. male with Stage IV CRC with liver metastasis. Doing well following extended right colectomy (10/2022). Undergoing systemic chemotherapy for liver mets    Plan:  Scheduled for surveillance colonoscopy     I have personally obtained a history and performed a physical exam with and independent to my resident and discussed the findings and plan with the patient.  I agree with the above findings and plan with the following exceptions:  None    H, Jared Bonilla MD, FACS, FASCRS  Staff Surgeon  Dept of Colon and Rectal Surgery  [facility] [place], [place]

## 2023-10-25 NOTE — DISCHARGE INSTRUCTIONS
Take the prescribe Bactrim to treat an underlying infection.    Follow-up with your primary care provider for further evaluation. Return to the emergency room for new, worsening, or concerning symptoms.     Future Appointments   Date Time Provider Department Center   10/27/2023  9:25 AM SPECIMEN, HEMON CANCER [facility] SPLABHO [place] [facility]   10/27/2023  9:50 AM LAB, HEMONC CANCER [facility] LAB HO [place] [facility]   10/30/2023  7:30 AM Anali Hernandez PA-C [facility] HEMONC2 [place]   10/30/2023  9:00 AM NURSE 11, NOM CHEMO [facility] CHEMO [place]   11/1/2023  4:00 PM CHEMO 11 [facility] NOM CHEMO [place]   11/29/2023 10:20 AM EDILMA Bonilla MD [facility] COLSURG [place] Dillon

## 2023-10-25 NOTE — ED PROVIDER NOTES
"Encounter Date: 10/25/2023       History     Chief Complaint   Patient presents with    Abscess     Returned to be drained     The history is provided by the patient and medical records. No  was used.     Javier Downs is a 73 y.o. male with medical history of  hepatic flexure cancer, metastatic to liver, right colectomy, omentectomy, HTN presenting to the ED with the chief complaint of skin complaint.     Patient seen in the ED 1 hour ago for same complaint and prematurely discharged prior to I&D being performed. He was called and instructed to return to the ED. HPI from prior ED visit below:    "Reports noticing a painful bump to his R lower neck about 5 days ago. Family member at bedside states they noticed white discharge. Denies fever, neck pain/stiffness, sore throat, dysphagia, shoulder pain, chest pain, SOB, abdominal pain, vomiting, urinary or bowel movement changes. Currently on chemotherapy. Seen in oncology clinic today and referred to the ED for further evaluation."    Review of patient's allergies indicates:  No Known Allergies  Past Medical History:   Diagnosis Date    MARIA A (acute kidney injury) 8/18/2022    Hypertension     Metastatic colon cancer to liver 4/22/2021     Past Surgical History:   Procedure Laterality Date    COLONOSCOPY N/A 4/20/2021    Procedure: COLONOSCOPY with possible stent;  Surgeon: EDILMA Bonilla MD;  Location: [facility] ([address] [place] [address]);  Service: Colon and Rectal;  Laterality: N/A;    DIAGNOSTIC LAPAROSCOPY N/A 9/7/2022    Procedure: LAPAROSCOPY, DIAGNOSTIC;  Surgeon: Adrian Rodriguez MD;  Location: [facility] OR [address] [place] [address];  Service: General;  Laterality: N/A;    INSERTION OF TUNNELED CENTRAL VENOUS CATHETER (CVC) WITH SUBCUTANEOUS PORT N/A 5/3/2021    Procedure: [id number]-[id number];  Surgeon: Francisco Layton MD;  [facility];  Service: Vascular;  Laterality: N/A;    OMENTECTOMY N/A 10/20/2022    Procedure: OMENTECTOMY;  Surgeon: EDILMA Bonilla MD;  " Location: [facility] OR 2ND FLR;  Service: Colon and Rectal;  Laterality: N/A;    RIGHT HEMICOLECTOMY N/A 10/20/2022    Procedure: HEMICOLECTOMY, RIGHT, extended;  Surgeon: EDILMA Bonilla MD;  Location: [facility] OR 2ND FLR;  Service: Colon and Rectal;  Laterality: N/A;  Extended right hemicolectomy CONSENT IN AM     No family history on file.  Social History     Tobacco Use    Smoking status: Never    Smokeless tobacco: Never   Substance Use Topics    Alcohol use: Not Currently    Drug use: Never     Review of Systems   Skin:  Positive for wound.       Physical Exam     Initial Vitals [10/25/23 1834]   BP Pulse Resp Temp SpO2   114/72 90 18 97.4 °F (36.3 °C) 100 %      MAP       --         Physical Exam     Constitutional: He appears well-developed and well-nourished. He is not diaphoretic. No distress.   HENT:   Head: Normocephalic and atraumatic.   Mouth/Throat: Oropharynx is clear and moist.   Eyes: EOM are normal. Pupils are equal, round, and reactive to light.   Neck:   Normal range of motion.  Cardiovascular:  Normal rate and regular rhythm.           Port R upper chest   Pulmonary/Chest: No respiratory distress.   Speaking full sentences without difficulty. No accessory muscle use.   Musculoskeletal:         General: Normal range of motion.      Cervical back: Normal range of motion.      Comments: 3cm boil to L paracentral upper back with expressible purulence. No surrounding cellulitis      Neurological: He is alert and oriented to person, place, and time.   Skin: Skin is warm and dry. No rash noted.     Upper back:      ED Course   I & D - Incision and Drainage    Date/Time: 10/25/2023 9:47 PM  Location procedure was performed: [facility] EMERGENCY DEPARTMENT    Performed by: Jose Kim PA-C  Authorized by: Baltazar Hernandez MD  Type: abscess  Body area: trunk  Location details: back  Anesthesia: local infiltration    Anesthesia:  Local Anesthetic: lidocaine 1% without epinephrine  Scalpel size: 11  Incision type:  single straight  Complexity: simple  Drainage: bloody and purulent  Drainage amount: moderate  Wound treatment: incision, wound left open, deloculation and expression of material  Complications: No  Specimens: Yes (Aerobic)  Implants: No        Labs Reviewed   CULTURE, AEROBIC  (SPECIFY SOURCE)          Imaging Results    None          Medications   LIDOcaine HCL 10 mg/ml (1%) injection 10 mL (10 mLs Infiltration Given by Provider 10/25/23 8437)     Medical Decision Making  Amount and/or Complexity of Data Reviewed  External Data Reviewed: labs and notes.  Labs: ordered. Decision-making details documented in ED Course.    Risk  Prescription drug management.         APC / Resident Notes:   73 y.o. male with medical history of hepatic flexure cancer, metastatic to liver, right colectomy, omentectomy, HTN presenting to the ED c/o boil to L upper back. He was in the ED earlier, but discharged prematurely. He was called and instructed to come back for I&D. Patient should not be charged for 2 ED visits.     DDx includes but not limited to abscess, keloid, sebaceous cyst, malignancy, fungal infection.    I&D performed as detailed in procedure note above. Aerobic culture taken. RX for Bactrim provided for further management. Okay for follow-up with PCP. Wound care instructions provided. Patient expresses understanding and agreeable to the plan. Return to ED precautions given for new, worsening, or concerning symptoms. I have discussed the care of this patient with my supervising physician.                             Clinical Impression:   Final diagnoses:  [Z76.89] Encounter for incision and drainage procedure  [L02.91] Abscess (Primary)        ED Disposition Condition    Discharge Stable          ED Prescriptions    None       Follow-up Information       Follow up With Specialties Details Why Contact Info    Bunny Schuster MD Hematology and Oncology   [address] [place] [id number]  [phone]                Jose Kim PA-C  10/25/23 2145

## 2023-10-25 NOTE — ED PROVIDER NOTES
Encounter Date: 10/25/2023       History     Chief Complaint   Patient presents with    Abscess     Abscess to back of neck; draining     The history is provided by the patient and medical records. No  was used.     Javier Downs is a 73 y.o. male with medical history of hepatic flexure cancer, metastatic to liver, right colectomy, omentectomy, HTN presenting to the ED with the chief complaint of skin complaint.     Reports noticing a painful bump to his R lower neck about 5 days ago. Family member at bedside states they noticed white discharge. Denies fever, neck pain/stiffness, sore throat, dysphagia, shoulder pain, chest pain, SOB, abdominal pain, vomiting, urinary or bowel movement changes. Currently on chemotherapy. Seen in oncology clinic today and referred to the ED for further evaluation.     Review of patient's allergies indicates:  No Known Allergies  Past Medical History:   Diagnosis Date    MARIA A (acute kidney injury) 8/18/2022    Hypertension     Metastatic colon cancer to liver 4/22/2021     Past Surgical History:   Procedure Laterality Date    COLONOSCOPY N/A 4/20/2021    Procedure: COLONOSCOPY with possible stent;  Surgeon: EDILMA Bonilla MD;  Location: [facility] ([address] [place] [address]);  Service: Colon and Rectal;  Laterality: N/A;    DIAGNOSTIC LAPAROSCOPY N/A 9/7/2022    Procedure: LAPAROSCOPY, DIAGNOSTIC;  Surgeon: Adrian Rodriguez MD;  Location: [facility] OR [address] [place] [address];  Service: General;  Laterality: N/A;    INSERTION OF TUNNELED CENTRAL VENOUS CATHETER (CVC) WITH SUBCUTANEOUS PORT N/A 5/3/2021    Procedure: [id number];  Surgeon: Francisco Layton MD;  [facility];  Service: Vascular;  Laterality: N/A;    OMENTECTOMY N/A 10/20/2022    Procedure: OMENTECTOMY;  Surgeon: EDILMA Bonilla MD;  Location: [facility] OR [address] [place] [address];  Service: Colon and Rectal;  Laterality: N/A;    RIGHT HEMICOLECTOMY N/A 10/20/2022    Procedure: HEMICOLECTOMY, RIGHT, extended;  Surgeon: EDILMA Bonilla,  MD;  Location: [facility] OR [address] [place] [address];  Service: Colon and Rectal;  Laterality: N/A;  Extended right hemicolectomy CONSENT IN AM     History reviewed. No pertinent family history.  Social History     Tobacco Use    Smoking status: Never    Smokeless tobacco: Never   Substance Use Topics    Alcohol use: Not Currently    Drug use: Never     Review of Systems   Skin:  Positive for wound.       Physical Exam     Initial Vitals [10/25/23 1509]   BP Pulse Resp Temp SpO2   122/69 90 16 98.2 °F (36.8 °C) 100 %      MAP       --         Physical Exam    Constitutional: He appears well-developed and well-nourished. He is not diaphoretic. No distress.   HENT:   Head: Normocephalic and atraumatic.   Mouth/Throat: Oropharynx is clear and moist.   Eyes: EOM are normal. Pupils are equal, round, and reactive to light.   Neck:   Normal range of motion.  Cardiovascular:  Normal rate and regular rhythm.           Port R upper chest   Pulmonary/Chest: No respiratory distress.   Speaking full sentences without difficulty. No accessory muscle use.   Musculoskeletal:         General: Normal range of motion.      Cervical back: Normal range of motion.      Comments: 3cm boil to L paracentral upper back with expressible purulence. No surrounding cellulitis     Neurological: He is alert and oriented to person, place, and time.   Skin: Skin is warm and dry. No rash noted.     Upper back:      ED Course   Procedures  Labs Reviewed - No data to display       Imaging Results    None          Medications - No data to display    Medical Decision Making  73 y.o. male with medical history of hepatic flexure cancer, metastatic to liver, right colectomy, omentectomy, HTN presenting to the ED c/o boil to L upper back.    DDx includes but not limited to abscess, keloid, sebaceous cyst, malignancy, fungal infection    Risk  Prescription drug management.           APC / Resident Notes:   Patient prematurely discharged prior to I&D being performed. Patient was  called back and instructed to come back to the ED for I&D which he subsequently did. Patient should not be charged for 2 ED visits. See following note. I have discussed the care of this patient with my supervising physician.                         Clinical Impression:   Final diagnoses:  [L02.91] Abscess (Primary)        ED Disposition Condition    Discharge Stable          ED Prescriptions       Medication Sig Dispense Start Date End Date Auth. Provider    sulfamethoxazole-trimethoprim 800-160mg (BACTRIM DS) 800-160 mg Tab Take 1 tablet by mouth 2 (two) times daily. for 7 days 14 tablet 10/25/2023 11/1/2023 Jose Kim PA-C          Follow-up Information       Follow up With Specialties Details Why Contact Info    Renetta García MD Gastroenterology   [address] [facility] [id number] [phone]               Jose Kim PA-C  10/25/23 6738

## 2023-10-25 NOTE — ED NOTES
Patient identifiers for Javier Downs 73 y.o. male checked and correct.    LOC: Patient is awake, alert, and aware of environment with an appropriate affect. Patient is oriented x 4 and speaking appropriately.  APPEARANCE: Patient resting comfortably and in no acute distress. Patient is clean and well groomed, patient's clothing is properly fastened.  HEENT: WDL  SKIN: The skin is warm and dry. Patient has normal skin turgor and moist mucus membranes. + abscess/swelling to back to neck  MUSKULOSKELETAL: Patient is moving all extremities well, no obvious deformities noted. Pulses intact.   RESPIRATORY: Airway is open and patent. Respirations are spontaneous and non-labored with normal effort and rate.  CARDIAC: Patient has a normal rate and rhythm 90. No peripheral edema noted.   ABDOMEN: No distention noted. Soft and non-tender upon palpation.  NEUROLOGICAL: pupils 3 mm, PERRL. Facial expression is symmetrical. Hand grasps are equal bilaterally. Normal sensation in all extremities when touched with finger.

## 2023-10-25 NOTE — ED TRIAGE NOTES
Pt endorses draining abscess to back of neck. Noticed large boil/swelling/neck stiffness on Friday. Pt denies NVD, fever, chills. Pt in active chemo

## 2023-10-26 NOTE — DISCHARGE INSTRUCTIONS
Take the prescribed Bactrim as directed  Follow-up with your primary care provider and oncologist for further evaluation.    Return to the emergency room for new, worsening, or concerning symptoms.     Future Appointments   Date Time Provider Department Center   10/27/2023  9:25 AM SPECIMEN, HEMON CANCER [facility] SPLABHO [place]   10/27/2023  9:50 [facility] LAB HO [place] [facility]   10/30/2023  7:30 AM Anali Hernandez PA-C [facility] HEMONC2 [place]   10/30/2023  9:00 AM NURSE 11, [facility] CHEMO [facility] CHEMO [place]   11/1/2023  4:00 PM CHEMO 11 [facility] CHEMO [place]   11/29/2023 10:20 AM EDILMA Bonilla MD [facility] COLSURG Donato Dillon

## 2023-10-27 ENCOUNTER — LAB VISIT (OUTPATIENT)
Dept: LAB | Facility: HOSPITAL | Age: 73
End: 2023-10-27
Attending: INTERNAL MEDICINE
Payer: MEDICARE

## 2023-10-27 ENCOUNTER — TELEPHONE (OUTPATIENT)
Dept: ENDOSCOPY | Facility: HOSPITAL | Age: 73
End: 2023-10-27
Payer: MEDICARE

## 2023-10-27 DIAGNOSIS — C78.7 METASTATIC COLON CANCER TO LIVER: ICD-10-CM

## 2023-10-27 DIAGNOSIS — C18.9 METASTATIC COLON CANCER TO LIVER: ICD-10-CM

## 2023-10-27 DIAGNOSIS — Z12.11 SCREEN FOR COLON CANCER: Primary | ICD-10-CM

## 2023-10-27 DIAGNOSIS — Z85.038 HISTORY OF COLON CANCER: Primary | ICD-10-CM

## 2023-10-27 LAB
ALBUMIN SERPL BCP-MCNC: 2.9 G/DL (ref 3.5–5.2)
ALP SERPL-CCNC: 155 U/L (ref 55–135)
ALT SERPL W/O P-5'-P-CCNC: 15 U/L (ref 10–44)
ANION GAP SERPL CALC-SCNC: 9 MMOL/L (ref 8–16)
AST SERPL-CCNC: 21 U/L (ref 10–40)
BILIRUB SERPL-MCNC: 0.6 MG/DL (ref 0.1–1)
BUN SERPL-MCNC: 8 MG/DL (ref 8–23)
CALCIUM SERPL-MCNC: 9 MG/DL (ref 8.7–10.5)
CEA SERPL-MCNC: 5.9 NG/ML (ref 0–5)
CHLORIDE SERPL-SCNC: 107 MMOL/L (ref 95–110)
CO2 SERPL-SCNC: 22 MMOL/L (ref 23–29)
CREAT SERPL-MCNC: 0.9 MG/DL (ref 0.5–1.4)
EST. GFR  (NO RACE VARIABLE): >60 ML/MIN/1.73 M^2
GLUCOSE SERPL-MCNC: 95 MG/DL (ref 70–110)
POTASSIUM SERPL-SCNC: 4.4 MMOL/L (ref 3.5–5.1)
PROT SERPL-MCNC: 6.7 G/DL (ref 6–8.4)
SODIUM SERPL-SCNC: 138 MMOL/L (ref 136–145)

## 2023-10-27 PROCEDURE — 36415 COLL VENOUS BLD VENIPUNCTURE: CPT | Performed by: INTERNAL MEDICINE

## 2023-10-27 PROCEDURE — 82378 CARCINOEMBRYONIC ANTIGEN: CPT | Performed by: INTERNAL MEDICINE

## 2023-10-27 PROCEDURE — 80053 COMPREHEN METABOLIC PANEL: CPT | Performed by: INTERNAL MEDICINE

## 2023-10-27 NOTE — TELEPHONE ENCOUNTER
"----- Message from Crystal Queen sent at 10/26/2023  9:46 AM CDT -----  Regarding: FW: colonoscopy    ----- Message -----  From: EDILMA Bonilla MD  Sent: 10/25/2023   3:33 PM CDT  To: [facility] Patients  Subject: colonoscopy                                      Procedure: Colonoscopy    Diagnosis: Surveillance colonoscopy - Hx of colon cancer    Procedure Timin-12 weeks    #If within 4 weeks selected, please grace as high priority#    #If greater than 12 weeks, please select "5-12 weeks" and delay sending until 3 months prior to requested date#     Provider: Myself    Location: [address]    Additional Scheduling Information: No scheduling concerns    Prep Specifications:Standard prep    Is the patient taking a GLP-1 Agonist:no    Have you attached a patient to this message: yes       "
Repeat colonoscopy in 6 months to evaluate the                          response to therapy. From 4/2021  
Spoke to Javier daughter to schedule procedure(s) Colonoscopy       Physician to perform procedure(s) Dr. EDILMA Bonilla  Date of Procedure (s) 11/3/23  Arrival Time 7:45 AM  Time of Procedure(s) 8:45 AM   Location of Procedure(s) [place] 4th Floor  Type of Rx Prep sent to patient: PEG  Instructions provided to patient via [facility]    Patient was informed on the following information and verbalized understanding. Screening questionnaire reviewed with patient and complete. If procedure requires anesthesia, a responsible adult needs to be present to accompany the patient home, patient cannot drive after receiving anesthesia. Appointment details are tentative, especially check-in time. Patient will receive a prep-op call 4 days prior to confirm check-in time for procedure. If applicable the patient should contact their pharmacy to verify Rx for procedure prep is ready for pick-up. Patient was advised to call the scheduling department at [phone] if pharmacy states no Rx is available. Patient was advised to call the endoscopy scheduling department if any questions or concerns arise.      [facility] Scheduling Department     
yes...

## 2023-10-28 LAB — BACTERIA SPEC AEROBE CULT: NO GROWTH

## 2023-10-30 ENCOUNTER — OFFICE VISIT (OUTPATIENT)
Dept: HEMATOLOGY/ONCOLOGY | Facility: CLINIC | Age: 73
End: 2023-10-30
Payer: MEDICARE

## 2023-10-30 ENCOUNTER — INFUSION (OUTPATIENT)
Dept: INFUSION THERAPY | Facility: HOSPITAL | Age: 73
End: 2023-10-30
Payer: MEDICARE

## 2023-10-30 VITALS
WEIGHT: 119.94 LBS | HEART RATE: 85 BPM | RESPIRATION RATE: 15 BRPM | SYSTOLIC BLOOD PRESSURE: 113 MMHG | DIASTOLIC BLOOD PRESSURE: 67 MMHG | BODY MASS INDEX: 20.47 KG/M2 | HEIGHT: 64 IN

## 2023-10-30 VITALS — DIASTOLIC BLOOD PRESSURE: 71 MMHG | SYSTOLIC BLOOD PRESSURE: 114 MMHG | TEMPERATURE: 98 F | HEART RATE: 72 BPM

## 2023-10-30 DIAGNOSIS — K59.03 DRUG-INDUCED CONSTIPATION: ICD-10-CM

## 2023-10-30 DIAGNOSIS — T45.1X5A IMMUNODEFICIENCY DUE TO CHEMOTHERAPY: ICD-10-CM

## 2023-10-30 DIAGNOSIS — D64.81 ANEMIA ASSOCIATED WITH CHEMOTHERAPY: ICD-10-CM

## 2023-10-30 DIAGNOSIS — C18.9 METASTATIC COLON CANCER TO LIVER: Primary | ICD-10-CM

## 2023-10-30 DIAGNOSIS — E43 SEVERE MALNUTRITION: ICD-10-CM

## 2023-10-30 DIAGNOSIS — D84.821 IMMUNODEFICIENCY DUE TO CHEMOTHERAPY: ICD-10-CM

## 2023-10-30 DIAGNOSIS — I10 PRIMARY HYPERTENSION: ICD-10-CM

## 2023-10-30 DIAGNOSIS — C78.7 METASTATIC COLON CANCER TO LIVER: Primary | ICD-10-CM

## 2023-10-30 DIAGNOSIS — L02.11 CUTANEOUS ABSCESS OF NECK: ICD-10-CM

## 2023-10-30 DIAGNOSIS — N17.9 AKI (ACUTE KIDNEY INJURY): ICD-10-CM

## 2023-10-30 DIAGNOSIS — R52 PAIN: ICD-10-CM

## 2023-10-30 DIAGNOSIS — T45.1X5A ANEMIA ASSOCIATED WITH CHEMOTHERAPY: ICD-10-CM

## 2023-10-30 DIAGNOSIS — R39.9 LOWER URINARY TRACT SYMPTOMS (LUTS): ICD-10-CM

## 2023-10-30 DIAGNOSIS — D49.9 IMMUNODEFICIENCY SECONDARY TO NEOPLASM: ICD-10-CM

## 2023-10-30 DIAGNOSIS — D84.81 IMMUNODEFICIENCY SECONDARY TO NEOPLASM: ICD-10-CM

## 2023-10-30 DIAGNOSIS — Z79.899 IMMUNODEFICIENCY DUE TO CHEMOTHERAPY: ICD-10-CM

## 2023-10-30 DIAGNOSIS — R63.4 WEIGHT DECREASE: ICD-10-CM

## 2023-10-30 LAB
BASOPHILS # BLD AUTO: 0.03 K/UL (ref 0–0.2)
BASOPHILS NFR BLD: 0.9 % (ref 0–1.9)
DIFFERENTIAL METHOD: ABNORMAL
EOSINOPHIL # BLD AUTO: 0.2 K/UL (ref 0–0.5)
EOSINOPHIL NFR BLD: 5.9 % (ref 0–8)
ERYTHROCYTE [DISTWIDTH] IN BLOOD BY AUTOMATED COUNT: 19.3 % (ref 11.5–14.5)
HCT VFR BLD AUTO: 33.2 % (ref 40–54)
HGB BLD-MCNC: 10 G/DL (ref 14–18)
IMM GRANULOCYTES # BLD AUTO: 0 K/UL (ref 0–0.04)
IMM GRANULOCYTES NFR BLD AUTO: 0 % (ref 0–0.5)
LYMPHOCYTES # BLD AUTO: 1 K/UL (ref 1–4.8)
LYMPHOCYTES NFR BLD: 30.1 % (ref 18–48)
MCH RBC QN AUTO: 25.3 PG (ref 27–31)
MCHC RBC AUTO-ENTMCNC: 30.1 G/DL (ref 32–36)
MCV RBC AUTO: 84 FL (ref 82–98)
MONOCYTES # BLD AUTO: 0.6 K/UL (ref 0.3–1)
MONOCYTES NFR BLD: 17.4 % (ref 4–15)
NEUTROPHILS # BLD AUTO: 1.5 K/UL (ref 1.8–7.7)
NEUTROPHILS NFR BLD: 45.7 % (ref 38–73)
NRBC BLD-RTO: 0 /100 WBC
PLATELET # BLD AUTO: 244 K/UL (ref 150–450)
PMV BLD AUTO: 10.4 FL (ref 9.2–12.9)
RBC # BLD AUTO: 3.95 M/UL (ref 4.6–6.2)
WBC # BLD AUTO: 3.22 K/UL (ref 3.9–12.7)

## 2023-10-30 PROCEDURE — 96375 TX/PRO/DX INJ NEW DRUG ADDON: CPT

## 2023-10-30 PROCEDURE — 3074F SYST BP LT 130 MM HG: CPT | Mod: CPTII,S$GLB,, | Performed by: PHYSICIAN ASSISTANT

## 2023-10-30 PROCEDURE — 3074F PR MOST RECENT SYSTOLIC BLOOD PRESSURE < 130 MM HG: ICD-10-PCS | Mod: CPTII,S$GLB,, | Performed by: PHYSICIAN ASSISTANT

## 2023-10-30 PROCEDURE — 99215 OFFICE O/P EST HI 40 MIN: CPT | Mod: S$GLB,,, | Performed by: PHYSICIAN ASSISTANT

## 2023-10-30 PROCEDURE — 96417 CHEMO IV INFUS EACH ADDL SEQ: CPT

## 2023-10-30 PROCEDURE — 99999 PR PBB SHADOW E&M-EST. PATIENT-LVL III: CPT | Mod: PBBFAC,,, | Performed by: PHYSICIAN ASSISTANT

## 2023-10-30 PROCEDURE — 99999 PR PBB SHADOW E&M-EST. PATIENT-LVL III: ICD-10-PCS | Mod: PBBFAC,,, | Performed by: PHYSICIAN ASSISTANT

## 2023-10-30 PROCEDURE — 85025 COMPLETE CBC W/AUTO DIFF WBC: CPT | Performed by: PHYSICIAN ASSISTANT

## 2023-10-30 PROCEDURE — 3078F PR MOST RECENT DIASTOLIC BLOOD PRESSURE < 80 MM HG: ICD-10-PCS | Mod: CPTII,S$GLB,, | Performed by: PHYSICIAN ASSISTANT

## 2023-10-30 PROCEDURE — 99215 PR OFFICE/OUTPT VISIT, EST, LEVL V, 40-54 MIN: ICD-10-PCS | Mod: S$GLB,,, | Performed by: PHYSICIAN ASSISTANT

## 2023-10-30 PROCEDURE — 25000003 PHARM REV CODE 250: Performed by: PHYSICIAN ASSISTANT

## 2023-10-30 PROCEDURE — 63600175 PHARM REV CODE 636 W HCPCS: Performed by: PHYSICIAN ASSISTANT

## 2023-10-30 PROCEDURE — 96367 TX/PROPH/DG ADDL SEQ IV INF: CPT

## 2023-10-30 PROCEDURE — 96416 CHEMO PROLONG INFUSE W/PUMP: CPT

## 2023-10-30 PROCEDURE — 96413 CHEMO IV INFUSION 1 HR: CPT

## 2023-10-30 PROCEDURE — 3078F DIAST BP <80 MM HG: CPT | Mod: CPTII,S$GLB,, | Performed by: PHYSICIAN ASSISTANT

## 2023-10-30 RX ORDER — PROCHLORPERAZINE EDISYLATE 5 MG/ML
5 INJECTION INTRAMUSCULAR; INTRAVENOUS ONCE AS NEEDED
Status: CANCELLED
Start: 2023-11-01

## 2023-10-30 RX ORDER — SODIUM CHLORIDE 0.9 % (FLUSH) 0.9 %
10 SYRINGE (ML) INJECTION
Status: CANCELLED | OUTPATIENT
Start: 2023-11-01

## 2023-10-30 RX ORDER — DIPHENHYDRAMINE HYDROCHLORIDE 50 MG/ML
50 INJECTION INTRAMUSCULAR; INTRAVENOUS ONCE AS NEEDED
Status: DISCONTINUED | OUTPATIENT
Start: 2023-10-30 | End: 2023-10-30 | Stop reason: HOSPADM

## 2023-10-30 RX ORDER — ATROPINE SULFATE 0.4 MG/ML
0.4 INJECTION, SOLUTION ENDOTRACHEAL; INTRAMEDULLARY; INTRAMUSCULAR; INTRAVENOUS; SUBCUTANEOUS ONCE AS NEEDED
Status: COMPLETED | OUTPATIENT
Start: 2023-10-30 | End: 2023-10-30

## 2023-10-30 RX ORDER — SODIUM CHLORIDE 0.9 % (FLUSH) 0.9 %
10 SYRINGE (ML) INJECTION
Status: DISCONTINUED | OUTPATIENT
Start: 2023-10-30 | End: 2023-10-30 | Stop reason: HOSPADM

## 2023-10-30 RX ORDER — HEPARIN 100 UNIT/ML
500 SYRINGE INTRAVENOUS
Status: CANCELLED | OUTPATIENT
Start: 2023-11-03

## 2023-10-30 RX ORDER — PROCHLORPERAZINE EDISYLATE 5 MG/ML
5 INJECTION INTRAMUSCULAR; INTRAVENOUS ONCE AS NEEDED
Status: DISCONTINUED | OUTPATIENT
Start: 2023-10-30 | End: 2023-10-30 | Stop reason: HOSPADM

## 2023-10-30 RX ORDER — HEPARIN 100 UNIT/ML
500 SYRINGE INTRAVENOUS
Status: DISCONTINUED | OUTPATIENT
Start: 2023-10-30 | End: 2023-10-30 | Stop reason: HOSPADM

## 2023-10-30 RX ORDER — SODIUM CHLORIDE 0.9 % (FLUSH) 0.9 %
10 SYRINGE (ML) INJECTION
Status: CANCELLED | OUTPATIENT
Start: 2023-11-03

## 2023-10-30 RX ORDER — EPINEPHRINE 0.3 MG/.3ML
0.3 INJECTION SUBCUTANEOUS ONCE AS NEEDED
Status: DISCONTINUED | OUTPATIENT
Start: 2023-10-30 | End: 2023-10-30 | Stop reason: HOSPADM

## 2023-10-30 RX ORDER — ATROPINE SULFATE 0.4 MG/ML
0.4 INJECTION, SOLUTION ENDOTRACHEAL; INTRAMEDULLARY; INTRAMUSCULAR; INTRAVENOUS; SUBCUTANEOUS ONCE AS NEEDED
Status: CANCELLED | OUTPATIENT
Start: 2023-11-01

## 2023-10-30 RX ORDER — DIPHENHYDRAMINE HYDROCHLORIDE 50 MG/ML
50 INJECTION INTRAMUSCULAR; INTRAVENOUS ONCE AS NEEDED
Status: CANCELLED | OUTPATIENT
Start: 2023-11-01

## 2023-10-30 RX ORDER — HEPARIN 100 UNIT/ML
500 SYRINGE INTRAVENOUS
Status: CANCELLED | OUTPATIENT
Start: 2023-11-01

## 2023-10-30 RX ORDER — EPINEPHRINE 0.3 MG/.3ML
0.3 INJECTION SUBCUTANEOUS ONCE AS NEEDED
Status: CANCELLED | OUTPATIENT
Start: 2023-11-01

## 2023-10-30 RX ADMIN — IRINOTECAN HYDROCHLORIDE 280 MG: 20 INJECTION, SOLUTION INTRAVENOUS at 11:10

## 2023-10-30 RX ADMIN — ATROPINE SULFATE 0.4 MG: 0.4 INJECTION, SOLUTION INTRAVENOUS at 11:10

## 2023-10-30 RX ADMIN — BEVACIZUMAB-AWWB 270 MG: 100 INJECTION, SOLUTION INTRAVENOUS at 10:10

## 2023-10-30 RX ADMIN — FLUOROURACIL 3840 MG: 50 INJECTION, SOLUTION INTRAVENOUS at 01:10

## 2023-10-30 RX ADMIN — DEXAMETHASONE SODIUM PHOSPHATE 0.25 MG: 4 INJECTION, SOLUTION INTRA-ARTICULAR; INTRALESIONAL; INTRAMUSCULAR; INTRAVENOUS; SOFT TISSUE at 11:10

## 2023-10-30 RX ADMIN — SODIUM CHLORIDE: 9 INJECTION, SOLUTION INTRAVENOUS at 09:10

## 2023-10-30 NOTE — PROGRESS NOTES
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 Bonilla  12/30/2022: Y-90 to R liver  1/27/2023: Y-90 to R liver  5/17/2023: Y-90 to R liver  7/11/2023: C1 FOLFIRI + Pema  7/26/2023: C2 FOLFIRI + Peam  8/8/2023: C3 FOLFIRI + Pema  8/22/23: C4 FOLFIRI + Pema  Restaging CT CAP 9/1/23: Good response to chemo.  9/7/23: C5 FOLFIRI + Pema  9/19/23: C6 FOLFIRI + Pema  10/3/23: C7 FOLFIRI + Pema      Interval History:   He presents today with his daughter prior to cycle 9 of FOLFIRI plus avastin. Continues to feel well.  Not using oxycodone due to lack of pain.  No diarrhea, nausea or vomiting.  Very mild fatigue. Was recently seen in the ER for an abscess to the neck. I&D was performed successfully and he was released without complication. He was placed on a course of antibiotics that he tolerated fine.     ECOG status is 1. Presents with his daughter today    Past Medical History:   Past Medical History:   Diagnosis Date    MARIA A (acute kidney injury) 8/18/2022    Hypertension     Metastatic colon cancer to liver 4/22/2021     Past Surgical HIstory:   Past Surgical History:   Procedure Laterality Date    COLONOSCOPY N/A 4/20/2021    Procedure: COLONOSCOPY with possible stent;  Surgeon: EDILMA Bonilla MD;  Location: [facility] ([address] [place] [address]);  Service: Colon and Rectal;   Laterality: N/A;    DIAGNOSTIC LAPAROSCOPY N/A 9/7/2022    Procedure: LAPAROSCOPY, DIAGNOSTIC;  Surgeon: Adrian Rodriguez MD;  Location: [facility] OR 2ND FLR;  Service: General;  Laterality: N/A;    INSERTION OF TUNNELED CENTRAL VENOUS CATHETER (CVC) WITH SUBCUTANEOUS PORT N/A 5/3/2021    Procedure: [id number];  Surgeon: Francisco Layton MD;  Location: [facility] OR 2ND FLR;  Service: Vascular;  Laterality: N/A;    OMENTECTOMY N/A 10/20/2022    Procedure: OMENTECTOMY;  Surgeon: EDILMA Bonilla MD;  Location: [facility] OR 2ND FLR;  Service: Colon and Rectal;  Laterality: N/A;    RIGHT HEMICOLECTOMY N/A 10/20/2022    Procedure: HEMICOLECTOMY, RIGHT, extended;  Surgeon: EDILMA Bonilla MD;  Location: [facility] OR 2ND FLR;  Service: Colon and Rectal;  Laterality: N/A;  Extended right hemicolectomy CONSENT IN AM     Family History: History reviewed. No pertinent family history.    Social History:  reports that he has never smoked. He has never used smokeless tobacco. He reports that he does not currently use alcohol. He reports that he does not use drugs.    Allergies:  Review of patient's allergies indicates:  No Known Allergies    Medications:  Current Outpatient Medications   Medication Sig Dispense Refill    acetaminophen (TYLENOL) 500 MG tablet Take 1 tablet (500 mg total) by mouth every 6 (six) hours as needed for Pain (alternate with ibuprofen). (Patient not taking: Reported on 6/28/2023)  0    amLODIPine (NORVASC) 10 MG tablet Take 1 tablet (10 mg total) by mouth once daily. 90 tablet 3    capecitabine (XELODA) 500 MG Tab Take 3 tablets (1,500 mg) by mouth twice daily with food on days 1 thru 7 of 14 day cycles. (Patient not taking: Reported on 6/28/2023.) 84 tablet 5    ibuprofen (ADVIL,MOTRIN) 400 MG tablet Take 1 tablet (400 mg total) by mouth every 6 (six) hours as needed for Other (pain). Alternate with tylenol (Patient not taking: Reported on 6/28/2023)      ondansetron (ZOFRAN) 4 MG tablet Take 1 tablet (4 mg  total) by mouth every 8 (eight) hours as needed for Nausea. (Patient not taking: Reported on 6/28/2023) 30 tablet 3    oxyCODONE (ROXICODONE) 5 MG immediate release tablet Take 1 tablet (5 mg total) by mouth every 6 (six) hours as needed for Pain. (Patient not taking: Reported on 6/28/2023) 20 tablet 0    sulfamethoxazole-trimethoprim 800-160mg (BACTRIM DS) 800-160 mg Tab Take 1 tablet by mouth 2 (two) times daily. for 7 days 14 tablet 0    tamsulosin (FLOMAX) 0.4 mg Cap Take 1 capsule (0.4 mg total) by mouth once daily. (Patient not taking: Reported on 6/28/2023) 30 capsule 5     No current facility-administered medications for this visit.     Review of Systems   Constitutional:  Positive for fatigue (improved). Negative for activity change, appetite change, chills, diaphoresis, fever and unexpected weight change.   HENT:  Negative for congestion, mouth sores, nosebleeds, postnasal drip, rhinorrhea, trouble swallowing and voice change.    Eyes:  Negative for pain and visual disturbance.   Respiratory:  Negative for cough, chest tightness, shortness of breath and wheezing.    Cardiovascular:  Negative for chest pain, palpitations and leg swelling.   Gastrointestinal:  Negative for abdominal distention, abdominal pain, blood in stool, constipation, diarrhea, nausea and vomiting.   Genitourinary:  Negative for difficulty urinating, dysuria, flank pain, frequency, hematuria and urgency.   Musculoskeletal:  Negative for arthralgias, back pain and myalgias.   Skin:  Negative for rash and wound.   Neurological:  Negative for dizziness, facial asymmetry, weakness, light-headedness, numbness and headaches.   Psychiatric/Behavioral:  Negative for agitation, behavioral problems, confusion, decreased concentration, dysphoric mood and sleep disturbance. The patient is not nervous/anxious.    All other systems reviewed and are negative.    ECOG Performance Status: 1     Objective:      Vitals:   Vitals:    10/30/23 0815   BP:  114/71   BP Location: Right arm   Patient Position: Sitting   BP Method: Large (Automatic)   Pulse: 72   Temp: 97.9 °F (36.6 °C)   TempSrc: Oral       Physical Exam  Vitals reviewed.   Constitutional:       General: He is not in acute distress.     Appearance: Normal appearance. He is not ill-appearing, toxic-appearing or diaphoretic.   HENT:      Head: Normocephalic and atraumatic.      Right Ear: External ear normal.      Left Ear: External ear normal.   Eyes:      General: No scleral icterus.     Extraocular Movements: Extraocular movements intact.      Conjunctiva/sclera: Conjunctivae normal.      Pupils: Pupils are equal, round, and reactive to light.   Neck:      Comments: Draining wound from I&D performed in the ER for an abscess. Bandaged. No surrounding infection at this time. No pain to the area  Cardiovascular:      Rate and Rhythm: Normal rate and regular rhythm.      Pulses: Normal pulses.      Heart sounds: Normal heart sounds. No murmur heard.  Pulmonary:      Effort: Pulmonary effort is normal. No respiratory distress.      Breath sounds: Normal breath sounds. No wheezing.   Chest:      Comments: Port to RCW, no signs of infection.  Abdominal:      General: Abdomen is flat. Bowel sounds are normal. There is no distension.      Palpations: Abdomen is soft. There is no mass.      Tenderness: There is no abdominal tenderness.      Comments: Vertical midline abdominal wound well healed   Musculoskeletal:         General: No swelling or deformity. Normal range of motion.      Right lower leg: No edema.      Left lower leg: No edema.   Skin:     Coloration: Skin is not jaundiced or pale.      Findings: No bruising, erythema or rash.   Neurological:      General: No focal deficit present.      Mental Status: He is alert and oriented to person, place, and time. Mental status is at baseline.      Cranial Nerves: No cranial nerve deficit.      Sensory: No sensory deficit.      Gait: Gait normal.   Psychiatric:          Mood and Affect: Mood normal.         Behavior: Behavior normal.         Thought Content: Thought content normal.         Judgment: Judgment normal.     Laboratory Data:  Lab Visit on 10/27/2023   Component Date Value Ref Range Status    Sodium 10/27/2023 138  136 - 145 mmol/L Final    Potassium 10/27/2023 4.4  3.5 - 5.1 mmol/L Final    Chloride 10/27/2023 107  95 - 110 mmol/L Final    CO2 10/27/2023 22 (L)  23 - 29 mmol/L Final    Glucose 10/27/2023 95  70 - 110 mg/dL Final    BUN 10/27/2023 8  8 - 23 mg/dL Final    Creatinine 10/27/2023 0.9  0.5 - 1.4 mg/dL Final    Calcium 10/27/2023 9.0  8.7 - 10.5 mg/dL Final    Total Protein 10/27/2023 6.7  6.0 - 8.4 g/dL Final    Albumin 10/27/2023 2.9 (L)  3.5 - 5.2 g/dL Final    Total Bilirubin 10/27/2023 0.6  0.1 - 1.0 mg/dL Final    Comment: For infants and newborns, interpretation of results should be based  on gestational age, weight and in agreement with clinical  observations.    Premature Infant recommended reference ranges:  Up to 24 hours.............<8.0 mg/dL  Up to 48 hours............<12.0 mg/dL  3-5 days..................<15.0 mg/dL  6-29 days.................<15.0 mg/dL      Alkaline Phosphatase 10/27/2023 155 (H)  55 - 135 U/L Final    AST 10/27/2023 21  10 - 40 U/L Final    ALT 10/27/2023 15  10 - 44 U/L Final    eGFR 10/27/2023 >60.0  >60 mL/min/1.73 m^2 Final    Anion Gap 10/27/2023 9  8 - 16 mmol/L Final    CEA 10/27/2023 5.9 (H)  0.0 - 5.0 ng/mL Final    Comment: CEA Normal Range:  Non-Smokers: 0-3.0 ng/mL  Smokers:     0-5.0 ng/mL    The testing method is a chemiluminescent microparticle immunoassay   manufactured by [employer] and performed on the [employer]   or   [employer] system. Values obtained with different assay manufacturers   for   methods may be different and cannot be used interchangeably.     Lab Visit on 10/27/2023   Component Date Value Ref Range Status    Specimen UA 10/27/2023 Urine, Unspecified   Final    Color, UA  10/27/2023 Yellow  Yellow, Straw, [place] Final    Appearance, UA 10/27/2023 Hazy (A)  Clear Final    pH, UA 10/27/2023 6.0  5.0 - 8.0 Final    Specific Gravity, UA 10/27/2023 1.015  1.005 - 1.030 Final    Protein, UA 10/27/2023 Negative  Negative Final    Comment: Recommend a 24 hour urine protein or a urine   protein/creatinine ratio if globulin induced proteinuria is  clinically suspected.      Glucose, UA 10/27/2023 Negative  Negative Final    Ketones, UA 10/27/2023 Negative  Negative Final    Bilirubin (UA) 10/27/2023 Negative  Negative Final    Occult Blood UA 10/27/2023 Negative  Negative Final    Nitrite, UA 10/27/2023 Negative  Negative Final    Leukocytes, UA 10/27/2023 Negative  Negative Final   Admission on 10/25/2023, Discharged on 10/25/2023   Component Date Value Ref Range Status    Aerobic Bacterial Culture 10/25/2023 No growth   Final     Assessment and Plan        1. 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 Immunodeficiency secondary to neoplasm    4. Primary hypertension    5. MARIA A (acute kidney injury)    6. Anemia associated with chemotherapy    7. Pain    8. Drug-induced constipation    9. Weight decrease    10. Severe malnutrition    11. Lower urinary tract symptoms (LUTS)    12. Cutaneous abscess of neck          1-3.  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  Guardant 360 was negative for BRAF, VIRI, HER2 amplification.   Pretreatment CEA 57.  We had a long and sonia discussion with him about his diagnosis. Unfortunately, the disease is not curable but remains treatable and he has good performance status.   Restaging scans after 7 cycles of FOLFOX + Pema showed significant reduction in colonic mass and liver mass.   we switched to maintenance as of cycle 8 with 5FU + Pema  Restaging scans from March 2022 show stable disease.   MRI on 7/18/22 showing hepatic progression of disease in the right hepatic lobe noted on the  exam. CT CAP on 8/26 shows some mild progression in both liver metastases.    Discussed case at colorectal tumor board 8/31/22 and had a diagnostic lap performed by Dr. Rodriguez on 9/7 to assess for any occult peritoneal disease. Findings from the diagnostic lap were negative. Fluid from around from around the primary mass was sent for cytology that was negative for malignancy.   Underwent primary tumor resection on 10/20/22 with Dr. Bonilla.    Meanwhile his liver mets had grown.  We discussed his case at [facility] conference with plans for short term Y-90 and then restarting chemotherapy prior to considering any surgical options to his liver metastases.  He underwent Y-90 delivery on 12/30/22. Tolerated well.   CT CAP on 1/23/23 reviewed at [facility] conference with good response to Y-90, no new lesions.  Underwent second Y-90 on 1/27/23. Can consider R hepatectomy pending follow-up imaging after Y-90.     Received cycle 42 of FOLFOX (omitted oxaliplatin again starting cycle 41 due to neuropathy) on 2/9/23.  Because of 5-FU shortage nationally, we have been holding chemotherapy since mid-February 2023.  If he needs to restart chemotherapy (I.e. no plans for surgical resection), will place him on capecitabine maintenance for duration of 5-FU shortage.     Discussed at colorectal liver mets tumor board 3/16/23.  Plan for repeat Y-90 and then consideration of surgical resection and he will meet with liver transplant team as well.  Underwent Y-90 delivery 5/17/23 with IR.     Has been on maintenance Xeloda since late April 2023.    Met with liver transplant team but ultimately opted not to proceed with transplant as he was concerned about how he would tolerate a major surgery with the life changes that would come after transplant as well. They closed out his case.    He met with Dr. Rodriguez to discuss whether surgical resection is indicated for his liver mets.  He recommended repeat MRI.  Repeat MRI unfortunately showed disease  progression while on Xeloda maintenance.  Similarly his CEA garrett precipitously, all in keeping with worsening disease.    We recommended we restart IV chemotherapy with FOLFIRI + Avastin (never had irinotecan).    Because of hyperbilirubinemia he received cycle 1 with just 5-FU + Avastin on 7/11/23. Tolerated this well. Bilirubin has improved.  Received irinotecan starting with cycle 2.  Repeat CT CAP after cycle 4 shows good response to therapy.   Excellent tolerance. [facility] tumor board agrees with continuation of chemotherapy.   Repeat CT CAP after cycle 7 shows stable disease, no evidence of new or worsening disease.      Labs pending from today. Will proceed with cycle 9 pending results. CEA has been downtrending.      -RTC in 2 weeks for next cycle. Will repeat imaging studies after cycle 11.    Tempus: APC, CKS1B cng, ERCC3 cnl, PIK3CA; KENIA, TMB 12.1.    4. Hypertension   -- BP normal today. Feels well.   -- taking Amlodipine .   -- Following with PCP.   -- encouraged him and his daughter to monitor BP and HR closely at home.     5. MARIA A  -- Resolved. Cr normal.   -- Monitor. Encouraged continued hydration particularly with working outside.     6. Anemia  -- Hgb stable.  Monitor.  -- No signs of bleeding. Platelets normal.     7-10. Neoplasm related pain, weight loss, constipation  -- Pain very well controlled.  Has oxycodone 5mg to use PRN but not requiring now.  -- Following with nutrition. Encouraged increased protein. Monitor.  -- Continue Miralax daily for constipation.    11, 12. Urinary hesitancy, boil  -- Continue Flomax.  -- Reports improvement since starting medication.   -- Seen in the ED for I&D. Healing well.     Patient is in agreement with the proposed treatment plan. All questions were answered to the patient's satisfaction. Pt knows to call clinic if anything is needed before the next clinic visit.      FAN Meier, DOIN  Physician Assistant Certified  Dept of Hematology/Oncology  ODIN  to Dr. Mueller, Dr. Schuster and Dr. Castellon     Route Chart for Scheduling    Med Onc Chart Routing      Follow up with physician 2 weeks and 6 weeks. See Dr. Schuster in 2 weeks for cycle 10 of FOLFIRI plus avastin. See Dr. Schuster in 6 weeks with lab work, CT CAP and next cycle of chemotherapy with treatment discussion   Follow up with RACHEL 4 weeks. See RACHEL in 4 weeks for cycle 11   Infusion scheduling note    Injection scheduling note    Labs CBC, CMP and CEA   Scheduling:  Preferred lab:  Lab interval: every 2 weeks     Imaging CT chest abdomen pelvis   Please schedule in 6 weeks prior to clinic visit with Dr. Schuster   Pharmacy appointment    Other referrals

## 2023-10-30 NOTE — PLAN OF CARE
0915 pt here for tx MVASI/irinotecan/camptorar. Pts chart reviewed allergies, tx, hx, meds. Pt reclined in chair. Family with pt during tx.

## 2023-10-30 NOTE — PLAN OF CARE
1340 pt tolerated tx without issue. Placed on pump at 2.2 ml/hr, infusing without issue. Access left in place for infusion over 46 hours. To return to clinic on 11/1/23 at 1130 am. D/c with family to home.

## 2023-10-31 ENCOUNTER — TELEPHONE (OUTPATIENT)
Dept: ENDOSCOPY | Facility: HOSPITAL | Age: 73
End: 2023-10-31
Payer: MEDICARE

## 2023-11-01 ENCOUNTER — INFUSION (OUTPATIENT)
Dept: INFUSION THERAPY | Facility: HOSPITAL | Age: 73
End: 2023-11-01
Payer: MEDICARE

## 2023-11-01 VITALS
OXYGEN SATURATION: 100 % | SYSTOLIC BLOOD PRESSURE: 111 MMHG | RESPIRATION RATE: 16 BRPM | HEART RATE: 72 BPM | DIASTOLIC BLOOD PRESSURE: 68 MMHG | TEMPERATURE: 98 F

## 2023-11-01 DIAGNOSIS — C18.9 METASTATIC COLON CANCER TO LIVER: Primary | ICD-10-CM

## 2023-11-01 DIAGNOSIS — C78.7 METASTATIC COLON CANCER TO LIVER: Primary | ICD-10-CM

## 2023-11-01 PROCEDURE — 63600175 PHARM REV CODE 636 W HCPCS: Performed by: PHYSICIAN ASSISTANT

## 2023-11-01 PROCEDURE — A4216 STERILE WATER/SALINE, 10 ML: HCPCS | Performed by: PHYSICIAN ASSISTANT

## 2023-11-01 PROCEDURE — 25000003 PHARM REV CODE 250: Performed by: PHYSICIAN ASSISTANT

## 2023-11-01 RX ORDER — SODIUM CHLORIDE 0.9 % (FLUSH) 0.9 %
10 SYRINGE (ML) INJECTION
Status: DISCONTINUED | OUTPATIENT
Start: 2023-11-01 | End: 2023-11-01 | Stop reason: HOSPADM

## 2023-11-01 RX ORDER — HEPARIN 100 UNIT/ML
500 SYRINGE INTRAVENOUS
Status: DISCONTINUED | OUTPATIENT
Start: 2023-11-01 | End: 2023-11-01 | Stop reason: HOSPADM

## 2023-11-01 RX ADMIN — Medication 10 ML: at 01:11

## 2023-11-01 RX ADMIN — HEPARIN 500 UNITS: 100 SYRINGE at 01:11

## 2023-11-01 NOTE — PLAN OF CARE
Pt ambulatory to clinic with daughter for pump d/c. Denies any sig complaints. Port completed pta. Port deaccessed after flushing. Pt from clinic in NAD.

## 2023-11-03 ENCOUNTER — ANESTHESIA (OUTPATIENT)
Dept: ENDOSCOPY | Facility: HOSPITAL | Age: 73
End: 2023-11-03
Payer: MEDICARE

## 2023-11-03 ENCOUNTER — HOSPITAL ENCOUNTER (OUTPATIENT)
Facility: HOSPITAL | Age: 73
Discharge: HOME | End: 2023-11-03
Attending: COLON & RECTAL SURGERY | Admitting: COLON & RECTAL SURGERY
Payer: MEDICARE

## 2023-11-03 ENCOUNTER — ANESTHESIA EVENT (OUTPATIENT)
Dept: ENDOSCOPY | Facility: HOSPITAL | Age: 73
End: 2023-11-03
Payer: MEDICARE

## 2023-11-03 VITALS
OXYGEN SATURATION: 98 % | HEIGHT: 68 IN | HEART RATE: 65 BPM | SYSTOLIC BLOOD PRESSURE: 104 MMHG | WEIGHT: 110 LBS | RESPIRATION RATE: 18 BRPM | TEMPERATURE: 98 F | BODY MASS INDEX: 16.67 KG/M2 | DIASTOLIC BLOOD PRESSURE: 61 MMHG

## 2023-11-03 DIAGNOSIS — Z12.11 SCREENING FOR COLON CANCER: ICD-10-CM

## 2023-11-03 PROCEDURE — 37000009 HC ANESTHESIA EA ADD 15 MINS: Performed by: COLON & RECTAL SURGERY

## 2023-11-03 PROCEDURE — 63600175 PHARM REV CODE 636 W HCPCS: Performed by: NURSE ANESTHETIST, CERTIFIED REGISTERED

## 2023-11-03 PROCEDURE — G0105 COLORECTAL SCRN; HI RISK IND: ICD-10-PCS | Mod: ,,, | Performed by: COLON & RECTAL SURGERY

## 2023-11-03 PROCEDURE — G0105 COLORECTAL SCRN; HI RISK IND: HCPCS | Mod: ,,, | Performed by: COLON & RECTAL SURGERY

## 2023-11-03 PROCEDURE — 37000008 HC ANESTHESIA 1ST 15 MINUTES: Performed by: COLON & RECTAL SURGERY

## 2023-11-03 PROCEDURE — 25000003 PHARM REV CODE 250: Performed by: NURSE ANESTHETIST, CERTIFIED REGISTERED

## 2023-11-03 PROCEDURE — E9220 PRA ENDO ANESTHESIA: ICD-10-PCS | Mod: ,,, | Performed by: NURSE ANESTHETIST, CERTIFIED REGISTERED

## 2023-11-03 PROCEDURE — E9220 PRA ENDO ANESTHESIA: HCPCS | Mod: ,,, | Performed by: NURSE ANESTHETIST, CERTIFIED REGISTERED

## 2023-11-03 PROCEDURE — 25000003 PHARM REV CODE 250: Performed by: COLON & RECTAL SURGERY

## 2023-11-03 PROCEDURE — G0105 COLORECTAL SCRN; HI RISK IND: HCPCS | Performed by: COLON & RECTAL SURGERY

## 2023-11-03 RX ORDER — PROPOFOL 10 MG/ML
VIAL (ML) INTRAVENOUS CONTINUOUS PRN
Status: DISCONTINUED | OUTPATIENT
Start: 2023-11-03 | End: 2023-11-03

## 2023-11-03 RX ORDER — SODIUM CHLORIDE 9 MG/ML
INJECTION, SOLUTION INTRAVENOUS CONTINUOUS
Status: DISCONTINUED | OUTPATIENT
Start: 2023-11-03 | End: 2023-11-03 | Stop reason: HOSPADM

## 2023-11-03 RX ORDER — LIDOCAINE HYDROCHLORIDE 20 MG/ML
INJECTION INTRAVENOUS
Status: DISCONTINUED | OUTPATIENT
Start: 2023-11-03 | End: 2023-11-03

## 2023-11-03 RX ADMIN — LIDOCAINE HYDROCHLORIDE 100 MG: 20 INJECTION INTRAVENOUS at 09:11

## 2023-11-03 RX ADMIN — SODIUM CHLORIDE: 0.9 INJECTION, SOLUTION INTRAVENOUS at 09:11

## 2023-11-03 RX ADMIN — PROPOFOL 200 MCG/KG/MIN: 10 INJECTION, EMULSION INTRAVENOUS at 09:11

## 2023-11-03 RX ADMIN — SODIUM CHLORIDE: 9 INJECTION, SOLUTION INTRAVENOUS at 08:11

## 2023-11-03 NOTE — H&P
COLONOSCOPY HISTORY AND PE    Procedure : Colonoscopy      INDICATIONS: asymptomatic screening exam and personal history of colon cancer      Past Medical History:   Diagnosis Date    MARIA A (acute kidney injury) 8/18/2022    Hypertension     Metastatic colon cancer to liver 4/22/2021       Past Surgical History:   Procedure Laterality Date    COLONOSCOPY N/A 4/20/2021    Procedure: COLONOSCOPY with possible stent;  Surgeon: EDILMA Bonilla MD;  Location: [facility] ENDO (2ND FLR);  Service: Colon and Rectal;  Laterality: N/A;    DIAGNOSTIC LAPAROSCOPY N/A 9/7/2022    Procedure: LAPAROSCOPY, DIAGNOSTIC;  Surgeon: Adrian Rodriguez MD;  Location: [facility] OR 2ND FLR;  Service: General;  Laterality: N/A;    INSERTION OF TUNNELED CENTRAL VENOUS CATHETER (CVC) WITH SUBCUTANEOUS PORT N/A 5/3/2021    Procedure: [id number];  Surgeon: Francisco Layton MD;  Location: [facility] OR 2ND FLR;  Service: Vascular;  Laterality: N/A;    OMENTECTOMY N/A 10/20/2022    Procedure: OMENTECTOMY;  Surgeon: EDILMA Bonilla MD;  Location: [facility] OR 2ND FLR;  Service: Colon and Rectal;  Laterality: N/A;    RIGHT HEMICOLECTOMY N/A 10/20/2022    Procedure: HEMICOLECTOMY, RIGHT, extended;  Surgeon: EDILMA Bonilla MD;  Location: [facility] OR 2ND FLR;  Service: Colon and Rectal;  Laterality: N/A;  Extended right hemicolectomy CONSENT IN AM       Review of patient's allergies indicates:  No Known Allergies    No current facility-administered medications on file prior to encounter.     Current Outpatient Medications on File Prior to Encounter   Medication Sig Dispense Refill    acetaminophen (TYLENOL) 500 MG tablet Take 1 tablet (500 mg total) by mouth every 6 (six) hours as needed for Pain (alternate with ibuprofen). (Patient not taking: Reported on 6/28/2023)  0    amLODIPine (NORVASC) 10 MG tablet Take 1 tablet (10 mg total) by mouth once daily. 90 tablet 3    capecitabine (XELODA) 500 MG Tab Take 3 tablets (1,500 mg) by mouth twice daily with food on days  1 thru 7 of 14 day cycles. (Patient not taking: Reported on 6/28/2023.) 84 tablet 5    ibuprofen (ADVIL,MOTRIN) 400 MG tablet Take 1 tablet (400 mg total) by mouth every 6 (six) hours as needed for Other (pain). Alternate with tylenol (Patient not taking: Reported on 6/28/2023)      ondansetron (ZOFRAN) 4 MG tablet Take 1 tablet (4 mg total) by mouth every 8 (eight) hours as needed for Nausea. (Patient not taking: Reported on 6/28/2023) 30 tablet 3    oxyCODONE (ROXICODONE) 5 MG immediate release tablet Take 1 tablet (5 mg total) by mouth every 6 (six) hours as needed for Pain. (Patient not taking: Reported on 6/28/2023) 20 tablet 0    tamsulosin (FLOMAX) 0.4 mg Cap Take 1 capsule (0.4 mg total) by mouth once daily. (Patient not taking: Reported on 6/28/2023) 30 capsule 5       No family history on file.    Social History     Socioeconomic History    Marital status:    Tobacco Use    Smoking status: Never    Smokeless tobacco: Never   Substance and Sexual Activity    Alcohol use: Not Currently    Drug use: Never    Sexual activity: Not Currently     Partners: Female       Review of Systems -    Respiratory : no cough, shortness of breath, or wheezing  Cardiovascular  no chest pain or dyspnea on exertion  Gastrointestinal no abdominal pain, change in bowel habits, or black or bloody stools  Musculoskeletal no deformities, swelling  Neurological no TIA or stroke symptoms        Physical Exam:  General: NAD  AT NC EOMI  Mallampati Score   Neck supple, trachea midline  Lungs: nl excursions, no retractions.  Breathing comfortably  Abdomen ND soft NT.  No masses  Extremities: No CCE.      ASA:  II    PLAN  COLONOSCOPY.  The details of the procedure, the possible need for biopsy or polypectomy and the potential risks including bleeding, perforation, missed polyps were discussed in detail.

## 2023-11-03 NOTE — TRANSFER OF CARE
"Anesthesia Transfer of Care Note    Patient: Javier Downs    Procedure(s) Performed: Procedure(s) (LRB):  COLONOSCOPY (N/A)    Patient location: PACU    Anesthesia Type: general    Transport from OR: Transported from OR on room air with adequate spontaneous ventilation    Post pain: adequate analgesia    Post assessment: no apparent anesthetic complications and tolerated procedure well    Post vital signs: stable    Level of consciousness: sedated and responds to stimulation    Nausea/Vomiting: no nausea/vomiting    Complications: none    Transfer of care protocol was followed      Last vitals: Visit Vitals  /62 (BP Location: Left arm, Patient Position: Lying)   Pulse 88   Temp 36.4 °C (97.5 °F) (Temporal)   Resp 18   Ht 5' 8" (1.727 m)   Wt 49.9 kg (110 lb)   SpO2 99%   BMI 16.73 kg/m²     "

## 2023-11-03 NOTE — ANESTHESIA POSTPROCEDURE EVALUATION
Anesthesia Post Evaluation    Patient: Javier Downs    Procedure(s) Performed: Procedure(s) (LRB):  COLONOSCOPY (N/A)    Final Anesthesia Type: general      Patient location during evaluation: PACU  Patient participation: Yes- Able to Participate  Level of consciousness: awake and alert  Post-procedure vital signs: reviewed and stable  Pain management: adequate  Airway patency: patent    PONV status at discharge: No PONV  Anesthetic complications: no      Cardiovascular status: blood pressure returned to baseline  Respiratory status: unassisted  Hydration status: euvolemic  Follow-up not needed.          Vitals Value Taken Time   /61 11/03/23 1022   Temp  11/03/23 1132   Pulse 65 11/03/23 1022   Resp 18 11/03/23 1022   SpO2 98 % 11/03/23 1022         Event Time   Out of Recovery 10:24:11         Pain/Jill Score: Jill Score: 10 (11/3/2023  9:51 AM)

## 2023-11-03 NOTE — PROVATION PATIENT INSTRUCTIONS
Discharge Summary/Instructions after an Endoscopic Procedure  Patient Name: Javier Downs  Patient MRN: [id number]  Patient YOB: 1950  Friday, November 3, 2023  Jared Bonilla MD  Dear patient,  As a result of recent federal legislation (The Federal Cures Act), you may   receive lab or pathology results from your procedure in your [facility]   account before your physician is able to contact you. Your physician or   their representative will relay the results to you with their   recommendations at their soonest availability.  Thank you,  RESTRICTIONS:  During your procedure today, you received medications for sedation.  These   medications may affect your judgment, balance and coordination.  Therefore,   for 24 hours, you have the following restrictions:   - DO NOT drive a car, operate machinery, make legal/financial decisions,   sign important papers or drink alcohol.    ACTIVITY:  Today: no heavy lifting, straining or running due to procedural   sedation/anesthesia.  The following day: return to full activity including work.  DIET:  Eat and drink normally unless instructed otherwise.     TREATMENT FOR COMMON SIDE EFFECTS:  - Mild abdominal pain, nausea, belching, bloating or excessive gas:  rest,   eat lightly and use a heating pad.  - Sore Throat: treat with throat lozenges and/or gargle with warm salt   water.  - Because air was used during the procedure, expelling large amounts of air   from your rectum or belching is normal.  - If a bowel prep was taken, you may not have a bowel movement for 1-3 days.    This is normal.  SYMPTOMS TO WATCH FOR AND REPORT TO YOUR PHYSICIAN:  1. Abdominal pain or bloating, other than gas cramps.  2. Chest pain.  3. Back pain.  4. Signs of infection such as: chills or fever occurring within 24 hours   after the procedure.  5. Rectal bleeding, which would show as bright red, maroon, or black stools.   (A tablespoon of blood from the rectum is not serious, especially if    hemorrhoids are present.)  6. Vomiting.  7. Weakness or dizziness.  GO DIRECTLY TO THE NEAREST EMERGENCY ROOM IF YOU HAVE ANY OF THE FOLLOWING:      Difficulty breathing              Chills and/or fever over 101 F   Persistent vomiting and/or vomiting blood   Severe abdominal pain   Severe chest pain   Black, tarry stools   Bleeding- more than one tablespoon   Any other symptom or condition that you feel may need urgent attention  Your doctor recommends these additional instructions:  If any biopsies were taken, your doctors clinic will contact you in 1 to 2   weeks with any results.  - Discharge patient to home (ambulatory).   - Patient has a contact number available for emergencies.  The signs and   symptoms of potential delayed complications were discussed with the   patient.  Return to normal activities tomorrow.  Written discharge   instructions were provided to the patient.   - Resume previous diet.   - Continue present medications.   - Repeat colonoscopy in 3 years for surveillance.  For questions, problems or results please call your physician - Jared Bonilla MD at Work:  [phone].  [facility], EMERGENCY ROOM PHONE NUMBER: [phone]  IF A COMPLICATION OR EMERGENCY SITUATION ARISES AND YOU ARE UNABLE TO REACH   YOUR PHYSICIAN - GO DIRECTLY TO THE EMERGENCY ROOM.  Jared Bonilla MD  11/3/2023 10:00:13 AM  This report has been verified and signed electronically.  Dear patient,  As a result of recent federal legislation (The Federal Cures Act), you may   receive lab or pathology results from your procedure in your [facility]   account before your physician is able to contact you. Your physician or   their representative will relay the results to you with their   recommendations at their soonest availability.  Thank you,  PROVATION

## 2023-11-03 NOTE — ANESTHESIA PREPROCEDURE EVALUATION
11/03/2023  Javier Downs is a 73 y.o., male.      Pre-op Assessment    I have reviewed the Patient Summary Reports.     I have reviewed the Nursing Notes. I have reviewed the NPO Status.   I have reviewed the Medications.     Review of Systems  Anesthesia Hx:  No problems with previous Anesthesia                Hematology/Oncology:       -- Anemia:                  Current/Recent Cancer.                EENT/Dental:  EENT/Dental Normal           Cardiovascular:  Exercise tolerance: good   Hypertension                Functional Capacity good / => 4 METS                   Hypertension, Essential Hypertension         Pulmonary:  Pulmonary Normal                       Renal/:  Chronic Renal Disease, ARF        Kidney Function/Disease      Acute Renal Failure (ARF), pre-renal etiology        Hepatic/GI:      Liver Disease,         Liver Disease    Hepatic Neoplasm, Hepatocellular Carcinoma + vicki   Musculoskeletal:  Musculoskeletal Normal                Neurological:  Neurology Normal                                      Endocrine:  Endocrine Normal            Dermatological:  Skin Normal    Psych:  Psychiatric Normal                    Physical Exam  General: Well nourished, Alert, Cooperative and Oriented    Airway:  Mallampati: II / II  Mouth Opening: Normal  TM Distance: Normal  Tongue: Normal  Neck ROM: Normal ROM    Dental:  Intact    Abdomen:  Normal, Soft, Nontender        Anesthesia Plan  Type of Anesthesia, risks & benefits discussed:    Anesthesia Type: Gen Natural Airway, MAC  Post Op Pain Control Plan: multimodal analgesia  Induction:  IV  Informed Consent: Informed consent signed with the Patient and all parties understand the risks and agree with anesthesia plan.  All questions answered. Patient consented to blood products? No  ASA Score: 3  Day of Surgery Review of History & Physical: I have  interviewed and examined the patient. I have reviewed the patient's H&P dated: tb. There are no significant changes.     Ready For Surgery From Anesthesia Perspective.     .

## 2023-11-12 NOTE — PROGRESS NOTES
[facility]  [facility]  Hematology/Medical Oncology Clinic     PATIENT: Javier Downs  MRN: [id number]  DATE: 11/13/2023    Diagnosis: Transverse colon metastatic cancer to the liver     Oncological history:   04/20/2021: metastatic colon cancer to the liver, moderately differentiated, pMMR  05/06/2021: C1 mFOLFOX + Pema  05/20/2021: C2 mFOLFOX + Pema  06/03/2021: C3 mFOLFOX + Pema   06/17/2021: C4 mFOLFOX + Pema  07/01/2021: C5 mFOLFOX + Pema  07/15/2021: C6 mFOLFOX + Pema  Restaging CT CAP: significant improvement of lesions   07/29/2021: C7 mFOLFOX + Pema   08/12/2021: Switched to maintenance  5FU+Pema (C8)  06/23/2022: C30 maintenance 5FU+Pema  10/20/2022: R hemicolectomy with Dr. Bonilla  12/30/2022: Y-90 to R liver  1/27/2023: Y-90 to R liver  5/17/2023: Y-90 to R liver  7/11/2023: C1 FOLFIRI + Pema  7/26/2023: C2 FOLFIRI + Pema  8/8/2023: C3 FOLFIRI + Pema  8/22/23: C4 FOLFIRI + Pema  Restaging CT CAP 9/1/23: Good response to chemo.  9/7/23: C5 FOLFIRI + Pema  9/19/23: C6 FOLFIRI + Pema  10/3/23: C7 FOLFIRI + Pema      Interval History:   He presents today with his daughter prior to cycle 10 of FOLFIRI plus avastin. Continues to feel well.  Not using oxycodone due to lack of pain.  No diarrhea, nausea or vomiting.  Very mild fatigue. Was recently seen in the ER for an abscess to the neck. I&D was performed successfully and he was released without complication. He was placed on a course of antibiotics that he tolerated fine. He is eating well and has a good appetite. No other concerns or complaints today.     ECOG status is 1. Presents with his daughter today    Past Medical History:   Past Medical History:   Diagnosis Date    MARIA A (acute kidney injury) 8/18/2022    Hypertension     Metastatic colon cancer to liver 4/22/2021     Past Surgical HIstory:   Past Surgical History:   Procedure Laterality Date    COLONOSCOPY N/A 4/20/2021    Procedure: COLONOSCOPY with possible stent;  Surgeon: EDILMA  Jared Bonilla MD;  Location: [facility] ENDO (2ND FLR);  Service: Colon and Rectal;  Laterality: N/A;    COLONOSCOPY N/A 11/3/2023    Procedure: COLONOSCOPY;  Surgeon: EDILMA Bonilla MD;  Location: [facility] ENDO (4TH FLR);  Service: Endoscopy;  Laterality: N/A;  prep instructions sent to pt via portal  From: EDILMA Bonilla MD  Procedure: Colonoscopy  Diagnosis: Surveillance colonoscopy - Hx of colon cancer  Procedure Timin-12 weeks  Provider: Myself  Location: [facility] 4-Endo  Additional Scheduling Information: No scheduling con    DIAGNOSTIC LAPAROSCOPY N/A 2022    Procedure: LAPAROSCOPY, DIAGNOSTIC;  Surgeon: Adrian Rodriguez MD;  Location: [facility] OR 2ND FLR;  Service: General;  Laterality: N/A;    INSERTION OF TUNNELED CENTRAL VENOUS CATHETER (CVC) WITH SUBCUTANEOUS PORT N/A 5/3/2021    Procedure: [id number]-VZOF-X-CATH;  Surgeon: Francisco Layton MD;  Location: [facility] OR 2ND FLR;  Service: Vascular;  Laterality: N/A;    OMENTECTOMY N/A 10/20/2022    Procedure: OMENTECTOMY;  Surgeon: EDILMA Bonilla MD;  Location: [facility] OR 2ND FLR;  Service: Colon and Rectal;  Laterality: N/A;    RIGHT HEMICOLECTOMY N/A 10/20/2022    Procedure: HEMICOLECTOMY, RIGHT, extended;  Surgeon: EDILMA Bonilla MD;  Location: [facility] OR 2ND FLR;  Service: Colon and Rectal;  Laterality: N/A;  Extended right hemicolectomy CONSENT IN AM     Family History:   Family History   Problem Relation Age of Onset    Cancer Brother        Social History:  reports that he has never smoked. He has never used smokeless tobacco. He reports that he does not currently use alcohol. He reports that he does not use drugs.    Allergies:  Review of patient's allergies indicates:  No Known Allergies    Medications:  Current Outpatient Medications   Medication Sig Dispense Refill    capecitabine (XELODA) 500 MG Tab Take 3 tablets (1,500 mg) by mouth twice daily with food on days 1 thru 7 of 14 day cycles. 84 tablet 5    acetaminophen (TYLENOL) 500 MG tablet Take 1 tablet  (500 mg total) by mouth every 6 (six) hours as needed for Pain (alternate with ibuprofen). (Patient not taking: Reported on 6/28/2023)  0    amLODIPine (NORVASC) 10 MG tablet Take 1 tablet (10 mg total) by mouth once daily. 90 tablet 3    ibuprofen (ADVIL,MOTRIN) 400 MG tablet Take 1 tablet (400 mg total) by mouth every 6 (six) hours as needed for Other (pain). Alternate with tylenol (Patient not taking: Reported on 6/28/2023)      ondansetron (ZOFRAN) 4 MG tablet Take 1 tablet (4 mg total) by mouth every 8 (eight) hours as needed for Nausea. (Patient not taking: Reported on 6/28/2023) 30 tablet 3    oxyCODONE (ROXICODONE) 5 MG immediate release tablet Take 1 tablet (5 mg total) by mouth every 6 (six) hours as needed for Pain. (Patient not taking: Reported on 6/28/2023) 20 tablet 0    tamsulosin (FLOMAX) 0.4 mg Cap Take 1 capsule (0.4 mg total) by mouth once daily. (Patient not taking: Reported on 6/28/2023) 30 capsule 5     No current facility-administered medications for this visit.     Review of Systems   Constitutional:  Positive for fatigue (improved). Negative for activity change, appetite change, chills, diaphoresis, fever and unexpected weight change.   HENT:  Negative for congestion, mouth sores, nosebleeds, postnasal drip, rhinorrhea, trouble swallowing and voice change.    Eyes:  Negative for pain and visual disturbance.   Respiratory:  Negative for cough, chest tightness, shortness of breath and wheezing.    Cardiovascular:  Negative for chest pain, palpitations and leg swelling.   Gastrointestinal:  Negative for abdominal distention, abdominal pain, blood in stool, constipation, diarrhea, nausea and vomiting.   Genitourinary:  Negative for difficulty urinating, dysuria, flank pain, frequency, hematuria and urgency.   Musculoskeletal:  Negative for arthralgias, back pain and myalgias.   Skin:  Negative for rash and wound.   Neurological:  Negative for dizziness, facial asymmetry, weakness,  "light-headedness, numbness and headaches.   Psychiatric/Behavioral:  Negative for agitation, behavioral problems, confusion, decreased concentration, dysphoric mood and sleep disturbance. The patient is not nervous/anxious.    All other systems reviewed and are negative.    ECOG Performance Status: 1     Objective:      Vitals:   Vitals:    11/13/23 0818   BP: 117/70   Pulse: 74   Resp: 20   SpO2: 100%   Weight: 53.9 kg (118 lb 13.3 oz)   Height: 5' 7" (1.702 m)         Physical Exam  Vitals reviewed.   Constitutional:       General: He is not in acute distress.     Appearance: Normal appearance. He is not ill-appearing, toxic-appearing or diaphoretic.   HENT:      Head: Normocephalic and atraumatic.      Right Ear: External ear normal.      Left Ear: External ear normal.   Eyes:      General: No scleral icterus.     Extraocular Movements: Extraocular movements intact.      Conjunctiva/sclera: Conjunctivae normal.      Pupils: Pupils are equal, round, and reactive to light.   Neck:      Comments: Draining wound from I&D performed in the ER for an abscess. Bandaged. No surrounding infection at this time. No pain to the area  Cardiovascular:      Rate and Rhythm: Normal rate and regular rhythm.      Pulses: Normal pulses.      Heart sounds: Normal heart sounds. No murmur heard.  Pulmonary:      Effort: Pulmonary effort is normal. No respiratory distress.      Breath sounds: Normal breath sounds. No wheezing.   Chest:      Comments: Port to RCW, no signs of infection.  Abdominal:      General: Abdomen is flat. Bowel sounds are normal. There is no distension.      Palpations: Abdomen is soft. There is no mass.      Tenderness: There is no abdominal tenderness.      Comments: Vertical midline abdominal wound well healed   Musculoskeletal:         General: No swelling or deformity. Normal range of motion.      Right lower leg: No edema.      Left lower leg: No edema.   Skin:     Coloration: Skin is not jaundiced or pale. "      Findings: No bruising, erythema or rash.   Neurological:      General: No focal deficit present.      Mental Status: He is alert and oriented to person, place, and time. Mental status is at baseline.      Cranial Nerves: No cranial nerve deficit.      Sensory: No sensory deficit.      Gait: Gait normal.   Psychiatric:         Mood and Affect: Mood normal.         Behavior: Behavior normal.         Thought Content: Thought content normal.         Judgment: Judgment normal.     Laboratory Data:  Lab Visit on 11/13/2023   Component Date Value Ref Range Status    WBC 11/13/2023 4.46  3.90 - 12.70 K/uL Final    RBC 11/13/2023 4.42 (L)  4.60 - 6.20 M/uL Final    Hemoglobin 11/13/2023 11.2 (L)  14.0 - 18.0 g/dL Final    Hematocrit 11/13/2023 38.1 (L)  40.0 - 54.0 % Final    MCV 11/13/2023 86  82 - 98 fL Final    MCH 11/13/2023 25.3 (L)  27.0 - 31.0 pg Final    MCHC 11/13/2023 29.4 (L)  32.0 - 36.0 g/dL Final    RDW 11/13/2023 19.0 (H)  11.5 - 14.5 % Final    Platelets 11/13/2023 222  150 - 450 K/uL Final    MPV 11/13/2023 9.4  9.2 - 12.9 fL Final    Gran # (ANC) 11/13/2023 2.5  1.8 - 7.7 K/uL Final    Comment: The ANC is based on a white cell differential from an   automated cell counter. It has not been microscopically   reviewed for the presence of abnormal cells. Clinical   correlation is required.      Immature Grans (Abs) 11/13/2023 0.01  0.00 - 0.04 K/uL Final    Comment: Mild elevation in immature granulocytes is non specific and   can be seen in a variety of conditions including stress response,   acute inflammation, trauma and pregnancy. Correlation with other   laboratory and clinical findings is essential.       Assessment and Plan        1. 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 Immunodeficiency secondary to neoplasm    4. Primary hypertension    5. MARIA A (acute kidney injury)    6. Anemia associated with chemotherapy    7. Pain    8. Drug-induced constipation    9.  Weight decrease    10. Severe malnutrition    11. Lower urinary tract symptoms (LUTS)    12. Cutaneous abscess of neck            1-3.  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  Guardant 360 was negative for BRAF, VIRI, HER2 amplification.   Pretreatment CEA 57.  We had a long and sonia discussion with him about his diagnosis. Unfortunately, the disease is not curable but remains treatable and he has good performance status.   Restaging scans after 7 cycles of FOLFOX + Pema showed significant reduction in colonic mass and liver mass.   we switched to maintenance as of cycle 8 with 5FU + Pema  Restaging scans from March 2022 show stable disease.   MRI on 7/18/22 showing hepatic progression of disease in the right hepatic lobe noted on the exam. CT CAP on 8/26 shows some mild progression in both liver metastases.    Discussed case at colorectal tumor board 8/31/22 and had a diagnostic lap performed by Dr. Rodriguez on 9/7 to assess for any occult peritoneal disease. Findings from the diagnostic lap were negative. Fluid from around from around the primary mass was sent for cytology that was negative for malignancy.   Underwent primary tumor resection on 10/20/22 with Dr. Bonilla.    Meanwhile his liver mets had grown.  We discussed his case at CRC conference with plans for short term Y-90 and then restarting chemotherapy prior to considering any surgical options to his liver metastases.  He underwent Y-90 delivery on 12/30/22. Tolerated well.   CT CAP on 1/23/23 reviewed at [facility] conference with good response to Y-90, no new lesions.  Underwent second Y-90 on 1/27/23. Can consider R hepatectomy pending follow-up imaging after Y-90.     Received cycle 42 of FOLFOX (omitted oxaliplatin again starting cycle 41 due to neuropathy) on 2/9/23.  Because of 5-FU shortage nationally, we have been holding chemotherapy since mid-February 2023.  If he needs to restart chemotherapy (I.e. no plans for  surgical resection), will place him on capecitabine maintenance for duration of 5-FU shortage.     Discussed at colorectal liver mets tumor board 3/16/23.  Plan for repeat Y-90 and then consideration of surgical resection and he will meet with liver transplant team as well.  Underwent Y-90 delivery 5/17/23 with IR.     Has been on maintenance Xeloda since late April 2023.    Met with liver transplant team but ultimately opted not to proceed with transplant as he was concerned about how he would tolerate a major surgery with the life changes that would come after transplant as well. They closed out his case.    He met with Dr. Rodriguez to discuss whether surgical resection is indicated for his liver mets.  He recommended repeat MRI.  Repeat MRI unfortunately showed disease progression while on Xeloda maintenance.  Similarly his CEA garrett precipitously, all in keeping with worsening disease.    We recommended we restart IV chemotherapy with FOLFIRI + Avastin (never had irinotecan).    Because of hyperbilirubinemia he received cycle 1 with just 5-FU + Avastin on 7/11/23. Tolerated this well. Bilirubin has improved.  Received irinotecan starting with cycle 2.  Repeat CT CAP after cycle 4 shows good response to therapy.   Excellent tolerance. mCRC tumor board agrees with continuation of chemotherapy.   Repeat CT CAP after cycle 7 shows stable disease, no evidence of new or worsening disease.      Doing well today. CBC stable and adequate today. CMP and tumor marker pending.  Will proceed with cycle 10.   CEA has been downtrending.      -RTC in 2 weeks for next cycle. Will repeat imaging studies after cycle 11.    Tempus: APC, CKS1B cng, ERCC3 cnl, PIK3CA; KENIA, TMB 12.1.    4. Hypertension   -- BP well controlled today. Feels well.   -- taking Amlodipine .   -- Following with PCP.   -- encouraged him and his daughter to monitor BP and HR closely at home.     5. MARIA A  -- Resolved during previous visit and Cr was normal.  Pending from today.   -- Monitor. Encouraged continued hydration particularly with working outside.     6. Anemia  -- Hgb improved.  Monitor.  -- No signs of bleeding. Platelets normal.     7-10. Neoplasm related pain, weight loss, constipation  -- Pain very well controlled.  Has oxycodone 5mg to use PRN but not requiring now.  -- Following with nutrition. Encouraged increased protein. Monitor.  -- Continue Miralax daily for constipation.    11, 12. Urinary hesitancy, boil  -- Continue Flomax.  -- Reports improvement since starting medication.   -- Seen in the ED for I&D. Healing well.     Patient is in agreement with the proposed treatment plan. All questions were answered to the patient's satisfaction. Pt knows to call clinic if anything is needed before the next clinic visit.      FAN Meier, PA-C  Physician Assistant Certified  Dept of Hematology/Oncology  PA-C to Dr. Mueller, Dr. Schuster and Dr. Castellon     Route Chart for Scheduling    Med Onc Chart Routing      Follow up with physician 2 weeks and 4 weeks. See Dr. Schuster as scheduled in 2 weeks with lab work and cycle 11 of chemotherapy. See Dr. Schuster as scheduled in 4 weeks with lab work, CT CAP and treatment   Follow up with RACHEL 6 weeks. See RACHEL in 6 weeks with lab work and treatment   Infusion scheduling note    Injection scheduling note    Labs CBC, CMP and CEA   Scheduling:  Preferred lab:  Lab interval: every 2 weeks     Imaging CT chest abdomen pelvis   Scheduled. Thank you   Pharmacy appointment    Other referrals

## 2023-11-13 ENCOUNTER — OFFICE VISIT (OUTPATIENT)
Dept: HEMATOLOGY/ONCOLOGY | Facility: CLINIC | Age: 73
End: 2023-11-13
Payer: MEDICARE

## 2023-11-13 ENCOUNTER — INFUSION (OUTPATIENT)
Dept: INFUSION THERAPY | Facility: HOSPITAL | Age: 73
End: 2023-11-13
Payer: MEDICARE

## 2023-11-13 VITALS
OXYGEN SATURATION: 100 % | HEIGHT: 67 IN | BODY MASS INDEX: 18.65 KG/M2 | DIASTOLIC BLOOD PRESSURE: 70 MMHG | SYSTOLIC BLOOD PRESSURE: 117 MMHG | HEART RATE: 74 BPM | RESPIRATION RATE: 20 BRPM | WEIGHT: 118.81 LBS

## 2023-11-13 VITALS
WEIGHT: 118.81 LBS | HEIGHT: 67 IN | DIASTOLIC BLOOD PRESSURE: 56 MMHG | SYSTOLIC BLOOD PRESSURE: 107 MMHG | TEMPERATURE: 98 F | BODY MASS INDEX: 18.65 KG/M2 | OXYGEN SATURATION: 100 % | RESPIRATION RATE: 18 BRPM | HEART RATE: 78 BPM

## 2023-11-13 DIAGNOSIS — L02.11 CUTANEOUS ABSCESS OF NECK: ICD-10-CM

## 2023-11-13 DIAGNOSIS — C78.7 METASTATIC COLON CANCER TO LIVER: Primary | ICD-10-CM

## 2023-11-13 DIAGNOSIS — D84.81 IMMUNODEFICIENCY SECONDARY TO NEOPLASM: ICD-10-CM

## 2023-11-13 DIAGNOSIS — K59.03 DRUG-INDUCED CONSTIPATION: ICD-10-CM

## 2023-11-13 DIAGNOSIS — Z79.899 IMMUNODEFICIENCY DUE TO CHEMOTHERAPY: ICD-10-CM

## 2023-11-13 DIAGNOSIS — D49.9 IMMUNODEFICIENCY SECONDARY TO NEOPLASM: ICD-10-CM

## 2023-11-13 DIAGNOSIS — I10 PRIMARY HYPERTENSION: ICD-10-CM

## 2023-11-13 DIAGNOSIS — T45.1X5A ANEMIA ASSOCIATED WITH CHEMOTHERAPY: ICD-10-CM

## 2023-11-13 DIAGNOSIS — R63.4 WEIGHT DECREASE: ICD-10-CM

## 2023-11-13 DIAGNOSIS — D64.81 ANEMIA ASSOCIATED WITH CHEMOTHERAPY: ICD-10-CM

## 2023-11-13 DIAGNOSIS — T45.1X5A IMMUNODEFICIENCY DUE TO CHEMOTHERAPY: ICD-10-CM

## 2023-11-13 DIAGNOSIS — D84.821 IMMUNODEFICIENCY DUE TO CHEMOTHERAPY: ICD-10-CM

## 2023-11-13 DIAGNOSIS — C18.9 METASTATIC COLON CANCER TO LIVER: Primary | ICD-10-CM

## 2023-11-13 DIAGNOSIS — N17.9 AKI (ACUTE KIDNEY INJURY): ICD-10-CM

## 2023-11-13 DIAGNOSIS — R52 PAIN: ICD-10-CM

## 2023-11-13 DIAGNOSIS — R39.9 LOWER URINARY TRACT SYMPTOMS (LUTS): ICD-10-CM

## 2023-11-13 DIAGNOSIS — E43 SEVERE MALNUTRITION: ICD-10-CM

## 2023-11-13 PROCEDURE — 3008F BODY MASS INDEX DOCD: CPT | Mod: CPTII,S$GLB,, | Performed by: PHYSICIAN ASSISTANT

## 2023-11-13 PROCEDURE — 96413 CHEMO IV INFUSION 1 HR: CPT

## 2023-11-13 PROCEDURE — 99999 PR PBB SHADOW E&M-EST. PATIENT-LVL III: ICD-10-PCS | Mod: PBBFAC,,, | Performed by: PHYSICIAN ASSISTANT

## 2023-11-13 PROCEDURE — 1160F RVW MEDS BY RX/DR IN RCRD: CPT | Mod: CPTII,S$GLB,, | Performed by: PHYSICIAN ASSISTANT

## 2023-11-13 PROCEDURE — 3078F DIAST BP <80 MM HG: CPT | Mod: CPTII,S$GLB,, | Performed by: PHYSICIAN ASSISTANT

## 2023-11-13 PROCEDURE — 96417 CHEMO IV INFUS EACH ADDL SEQ: CPT

## 2023-11-13 PROCEDURE — 25000003 PHARM REV CODE 250: Performed by: PHYSICIAN ASSISTANT

## 2023-11-13 PROCEDURE — 1126F AMNT PAIN NOTED NONE PRSNT: CPT | Mod: CPTII,S$GLB,, | Performed by: PHYSICIAN ASSISTANT

## 2023-11-13 PROCEDURE — 3008F PR BODY MASS INDEX (BMI) DOCUMENTED: ICD-10-PCS | Mod: CPTII,S$GLB,, | Performed by: PHYSICIAN ASSISTANT

## 2023-11-13 PROCEDURE — 3074F SYST BP LT 130 MM HG: CPT | Mod: CPTII,S$GLB,, | Performed by: PHYSICIAN ASSISTANT

## 2023-11-13 PROCEDURE — 3074F PR MOST RECENT SYSTOLIC BLOOD PRESSURE < 130 MM HG: ICD-10-PCS | Mod: CPTII,S$GLB,, | Performed by: PHYSICIAN ASSISTANT

## 2023-11-13 PROCEDURE — 1159F PR MEDICATION LIST DOCUMENTED IN MEDICAL RECORD: ICD-10-PCS | Mod: CPTII,S$GLB,, | Performed by: PHYSICIAN ASSISTANT

## 2023-11-13 PROCEDURE — 99215 PR OFFICE/OUTPT VISIT, EST, LEVL V, 40-54 MIN: ICD-10-PCS | Mod: S$GLB,,, | Performed by: PHYSICIAN ASSISTANT

## 2023-11-13 PROCEDURE — 99999 PR PBB SHADOW E&M-EST. PATIENT-LVL III: CPT | Mod: PBBFAC,,, | Performed by: PHYSICIAN ASSISTANT

## 2023-11-13 PROCEDURE — 99215 OFFICE O/P EST HI 40 MIN: CPT | Mod: S$GLB,,, | Performed by: PHYSICIAN ASSISTANT

## 2023-11-13 PROCEDURE — 63600175 PHARM REV CODE 636 W HCPCS: Performed by: PHYSICIAN ASSISTANT

## 2023-11-13 PROCEDURE — 1160F PR REVIEW ALL MEDS BY PRESCRIBER/CLIN PHARMACIST DOCUMENTED: ICD-10-PCS | Mod: CPTII,S$GLB,, | Performed by: PHYSICIAN ASSISTANT

## 2023-11-13 PROCEDURE — 3078F PR MOST RECENT DIASTOLIC BLOOD PRESSURE < 80 MM HG: ICD-10-PCS | Mod: CPTII,S$GLB,, | Performed by: PHYSICIAN ASSISTANT

## 2023-11-13 PROCEDURE — 1159F MED LIST DOCD IN RCRD: CPT | Mod: CPTII,S$GLB,, | Performed by: PHYSICIAN ASSISTANT

## 2023-11-13 PROCEDURE — 1126F PR PAIN SEVERITY QUANTIFIED, NO PAIN PRESENT: ICD-10-PCS | Mod: CPTII,S$GLB,, | Performed by: PHYSICIAN ASSISTANT

## 2023-11-13 PROCEDURE — 96375 TX/PRO/DX INJ NEW DRUG ADDON: CPT

## 2023-11-13 PROCEDURE — 96416 CHEMO PROLONG INFUSE W/PUMP: CPT

## 2023-11-13 RX ORDER — PROCHLORPERAZINE EDISYLATE 5 MG/ML
5 INJECTION INTRAMUSCULAR; INTRAVENOUS ONCE AS NEEDED
Status: CANCELLED
Start: 2023-11-15

## 2023-11-13 RX ORDER — ATROPINE SULFATE 0.4 MG/ML
0.4 INJECTION, SOLUTION ENDOTRACHEAL; INTRAMEDULLARY; INTRAMUSCULAR; INTRAVENOUS; SUBCUTANEOUS ONCE AS NEEDED
Status: DISCONTINUED | OUTPATIENT
Start: 2023-11-13 | End: 2023-11-13 | Stop reason: HOSPADM

## 2023-11-13 RX ORDER — HEPARIN 100 UNIT/ML
500 SYRINGE INTRAVENOUS
Status: DISCONTINUED | OUTPATIENT
Start: 2023-11-13 | End: 2023-11-13 | Stop reason: HOSPADM

## 2023-11-13 RX ORDER — DIPHENHYDRAMINE HYDROCHLORIDE 50 MG/ML
50 INJECTION INTRAMUSCULAR; INTRAVENOUS ONCE AS NEEDED
Status: CANCELLED | OUTPATIENT
Start: 2023-11-15

## 2023-11-13 RX ORDER — DIPHENHYDRAMINE HYDROCHLORIDE 50 MG/ML
50 INJECTION INTRAMUSCULAR; INTRAVENOUS ONCE AS NEEDED
Status: DISCONTINUED | OUTPATIENT
Start: 2023-11-13 | End: 2023-11-13 | Stop reason: HOSPADM

## 2023-11-13 RX ORDER — PROCHLORPERAZINE EDISYLATE 5 MG/ML
5 INJECTION INTRAMUSCULAR; INTRAVENOUS ONCE AS NEEDED
Status: DISCONTINUED | OUTPATIENT
Start: 2023-11-13 | End: 2023-11-13 | Stop reason: HOSPADM

## 2023-11-13 RX ORDER — SODIUM CHLORIDE 0.9 % (FLUSH) 0.9 %
10 SYRINGE (ML) INJECTION
Status: DISCONTINUED | OUTPATIENT
Start: 2023-11-13 | End: 2023-11-13 | Stop reason: HOSPADM

## 2023-11-13 RX ORDER — EPINEPHRINE 0.3 MG/.3ML
0.3 INJECTION SUBCUTANEOUS ONCE AS NEEDED
Status: DISCONTINUED | OUTPATIENT
Start: 2023-11-13 | End: 2023-11-13 | Stop reason: HOSPADM

## 2023-11-13 RX ORDER — HEPARIN 100 UNIT/ML
500 SYRINGE INTRAVENOUS
Status: CANCELLED | OUTPATIENT
Start: 2023-11-15

## 2023-11-13 RX ORDER — ATROPINE SULFATE 0.4 MG/ML
0.4 INJECTION, SOLUTION ENDOTRACHEAL; INTRAMEDULLARY; INTRAMUSCULAR; INTRAVENOUS; SUBCUTANEOUS ONCE AS NEEDED
Status: CANCELLED | OUTPATIENT
Start: 2023-11-15

## 2023-11-13 RX ORDER — EPINEPHRINE 0.3 MG/.3ML
0.3 INJECTION SUBCUTANEOUS ONCE AS NEEDED
Status: CANCELLED | OUTPATIENT
Start: 2023-11-15

## 2023-11-13 RX ORDER — SODIUM CHLORIDE 0.9 % (FLUSH) 0.9 %
10 SYRINGE (ML) INJECTION
Status: CANCELLED | OUTPATIENT
Start: 2023-11-17

## 2023-11-13 RX ORDER — SODIUM CHLORIDE 0.9 % (FLUSH) 0.9 %
10 SYRINGE (ML) INJECTION
Status: CANCELLED | OUTPATIENT
Start: 2023-11-15

## 2023-11-13 RX ORDER — HEPARIN 100 UNIT/ML
500 SYRINGE INTRAVENOUS
Status: CANCELLED | OUTPATIENT
Start: 2023-11-17

## 2023-11-13 RX ADMIN — FLUOROURACIL 3840 MG: 50 INJECTION, SOLUTION INTRAVENOUS at 02:11

## 2023-11-13 RX ADMIN — SODIUM CHLORIDE: 9 INJECTION, SOLUTION INTRAVENOUS at 10:11

## 2023-11-13 RX ADMIN — BEVACIZUMAB-AWWB 270 MG: 100 INJECTION, SOLUTION INTRAVENOUS at 11:11

## 2023-11-13 RX ADMIN — DEXAMETHASONE SODIUM PHOSPHATE 0.25 MG: 4 INJECTION, SOLUTION INTRA-ARTICULAR; INTRALESIONAL; INTRAMUSCULAR; INTRAVENOUS; SOFT TISSUE at 12:11

## 2023-11-13 RX ADMIN — IRINOTECAN HYDROCHLORIDE 280 MG: 20 INJECTION, SOLUTION INTRAVENOUS at 12:11

## 2023-11-13 NOTE — PLAN OF CARE
Pt tolerated MVASI and Folfiri well. No adverse reaction noted. Pt education reinforced on chemo regimen, side effects, what to expect, and when to call Dr. Pt verbalized understanding. I reviewed pt calendar w/ pt and understanding verbalized.   PAC remains accessed with 5FU infusing at 2.2cc/hr to portable chemo pump. Patent upon discharge.

## 2023-11-15 ENCOUNTER — INFUSION (OUTPATIENT)
Dept: INFUSION THERAPY | Facility: HOSPITAL | Age: 73
End: 2023-11-15
Payer: MEDICARE

## 2023-11-15 VITALS
SYSTOLIC BLOOD PRESSURE: 111 MMHG | TEMPERATURE: 98 F | HEART RATE: 78 BPM | RESPIRATION RATE: 18 BRPM | DIASTOLIC BLOOD PRESSURE: 72 MMHG | OXYGEN SATURATION: 100 %

## 2023-11-15 DIAGNOSIS — C78.7 METASTATIC COLON CANCER TO LIVER: Primary | ICD-10-CM

## 2023-11-15 DIAGNOSIS — C18.9 METASTATIC COLON CANCER TO LIVER: Primary | ICD-10-CM

## 2023-11-15 PROCEDURE — 63600175 PHARM REV CODE 636 W HCPCS: Performed by: PHYSICIAN ASSISTANT

## 2023-11-15 PROCEDURE — A4216 STERILE WATER/SALINE, 10 ML: HCPCS | Performed by: PHYSICIAN ASSISTANT

## 2023-11-15 PROCEDURE — 25000003 PHARM REV CODE 250: Performed by: PHYSICIAN ASSISTANT

## 2023-11-15 RX ORDER — SODIUM CHLORIDE 0.9 % (FLUSH) 0.9 %
10 SYRINGE (ML) INJECTION
Status: DISCONTINUED | OUTPATIENT
Start: 2023-11-15 | End: 2023-11-15 | Stop reason: HOSPADM

## 2023-11-15 RX ORDER — HEPARIN 100 UNIT/ML
500 SYRINGE INTRAVENOUS
Status: DISCONTINUED | OUTPATIENT
Start: 2023-11-15 | End: 2023-11-15 | Stop reason: HOSPADM

## 2023-11-15 RX ADMIN — Medication 10 ML: at 12:11

## 2023-11-15 RX ADMIN — HEPARIN 500 UNITS: 100 SYRINGE at 12:11

## 2023-11-15 NOTE — PLAN OF CARE
Patient ambulatory to clinic with daughter. VSS.   No complaints of adverse affects, S/S of infection or other problems.  CADD pump d/c.  Port flushed with no difficulty, heparin locked and deaccessed.  AVS printed and given to patient.  Ambulatory home with daughter NAD noted.

## 2023-11-27 ENCOUNTER — OFFICE VISIT (OUTPATIENT)
Dept: HEMATOLOGY/ONCOLOGY | Facility: CLINIC | Age: 73
End: 2023-11-27
Payer: MEDICARE

## 2023-11-27 ENCOUNTER — INFUSION (OUTPATIENT)
Dept: INFUSION THERAPY | Facility: HOSPITAL | Age: 73
End: 2023-11-27
Payer: MEDICARE

## 2023-11-27 VITALS
WEIGHT: 124.88 LBS | HEART RATE: 81 BPM | RESPIRATION RATE: 18 BRPM | TEMPERATURE: 98 F | SYSTOLIC BLOOD PRESSURE: 116 MMHG | DIASTOLIC BLOOD PRESSURE: 67 MMHG | OXYGEN SATURATION: 100 % | BODY MASS INDEX: 19.6 KG/M2 | HEIGHT: 67 IN

## 2023-11-27 VITALS
HEART RATE: 67 BPM | HEIGHT: 67 IN | BODY MASS INDEX: 19.6 KG/M2 | DIASTOLIC BLOOD PRESSURE: 61 MMHG | OXYGEN SATURATION: 100 % | SYSTOLIC BLOOD PRESSURE: 107 MMHG | TEMPERATURE: 98 F | RESPIRATION RATE: 18 BRPM | WEIGHT: 124.88 LBS

## 2023-11-27 DIAGNOSIS — T45.1X5A IMMUNODEFICIENCY DUE TO CHEMOTHERAPY: ICD-10-CM

## 2023-11-27 DIAGNOSIS — T45.1X5A ANEMIA ASSOCIATED WITH CHEMOTHERAPY: ICD-10-CM

## 2023-11-27 DIAGNOSIS — I10 PRIMARY HYPERTENSION: ICD-10-CM

## 2023-11-27 DIAGNOSIS — R63.4 WEIGHT DECREASE: ICD-10-CM

## 2023-11-27 DIAGNOSIS — N17.9 AKI (ACUTE KIDNEY INJURY): ICD-10-CM

## 2023-11-27 DIAGNOSIS — L02.11 CUTANEOUS ABSCESS OF NECK: ICD-10-CM

## 2023-11-27 DIAGNOSIS — C18.9 METASTATIC COLON CANCER TO LIVER: Primary | ICD-10-CM

## 2023-11-27 DIAGNOSIS — D84.821 IMMUNODEFICIENCY DUE TO CHEMOTHERAPY: ICD-10-CM

## 2023-11-27 DIAGNOSIS — C78.7 METASTATIC COLON CANCER TO LIVER: Primary | ICD-10-CM

## 2023-11-27 DIAGNOSIS — K59.03 DRUG-INDUCED CONSTIPATION: ICD-10-CM

## 2023-11-27 DIAGNOSIS — D49.9 IMMUNODEFICIENCY SECONDARY TO NEOPLASM: ICD-10-CM

## 2023-11-27 DIAGNOSIS — R52 PAIN: ICD-10-CM

## 2023-11-27 DIAGNOSIS — D64.81 ANEMIA ASSOCIATED WITH CHEMOTHERAPY: ICD-10-CM

## 2023-11-27 DIAGNOSIS — R39.9 LOWER URINARY TRACT SYMPTOMS (LUTS): ICD-10-CM

## 2023-11-27 DIAGNOSIS — E43 SEVERE MALNUTRITION: ICD-10-CM

## 2023-11-27 DIAGNOSIS — Z79.899 IMMUNODEFICIENCY DUE TO CHEMOTHERAPY: ICD-10-CM

## 2023-11-27 DIAGNOSIS — D84.81 IMMUNODEFICIENCY SECONDARY TO NEOPLASM: ICD-10-CM

## 2023-11-27 PROCEDURE — 63600175 PHARM REV CODE 636 W HCPCS: Performed by: REGISTERED NURSE

## 2023-11-27 PROCEDURE — 3008F PR BODY MASS INDEX (BMI) DOCUMENTED: ICD-10-PCS | Mod: CPTII,S$GLB,, | Performed by: INTERNAL MEDICINE

## 2023-11-27 PROCEDURE — 99999 PR PBB SHADOW E&M-EST. PATIENT-LVL III: CPT | Mod: PBBFAC,,, | Performed by: INTERNAL MEDICINE

## 2023-11-27 PROCEDURE — 3008F BODY MASS INDEX DOCD: CPT | Mod: CPTII,S$GLB,, | Performed by: INTERNAL MEDICINE

## 2023-11-27 PROCEDURE — 96415 CHEMO IV INFUSION ADDL HR: CPT

## 2023-11-27 PROCEDURE — 99215 PR OFFICE/OUTPT VISIT, EST, LEVL V, 40-54 MIN: ICD-10-PCS | Mod: S$GLB,,, | Performed by: INTERNAL MEDICINE

## 2023-11-27 PROCEDURE — 96416 CHEMO PROLONG INFUSE W/PUMP: CPT

## 2023-11-27 PROCEDURE — 96413 CHEMO IV INFUSION 1 HR: CPT

## 2023-11-27 PROCEDURE — 96375 TX/PRO/DX INJ NEW DRUG ADDON: CPT

## 2023-11-27 PROCEDURE — 1101F PT FALLS ASSESS-DOCD LE1/YR: CPT | Mod: CPTII,S$GLB,, | Performed by: INTERNAL MEDICINE

## 2023-11-27 PROCEDURE — 99215 OFFICE O/P EST HI 40 MIN: CPT | Mod: S$GLB,,, | Performed by: INTERNAL MEDICINE

## 2023-11-27 PROCEDURE — 25000003 PHARM REV CODE 250: Performed by: REGISTERED NURSE

## 2023-11-27 PROCEDURE — 3078F DIAST BP <80 MM HG: CPT | Mod: CPTII,S$GLB,, | Performed by: INTERNAL MEDICINE

## 2023-11-27 PROCEDURE — 1159F MED LIST DOCD IN RCRD: CPT | Mod: CPTII,S$GLB,, | Performed by: INTERNAL MEDICINE

## 2023-11-27 PROCEDURE — 3288F PR FALLS RISK ASSESSMENT DOCUMENTED: ICD-10-PCS | Mod: CPTII,S$GLB,, | Performed by: INTERNAL MEDICINE

## 2023-11-27 PROCEDURE — 3288F FALL RISK ASSESSMENT DOCD: CPT | Mod: CPTII,S$GLB,, | Performed by: INTERNAL MEDICINE

## 2023-11-27 PROCEDURE — 1160F RVW MEDS BY RX/DR IN RCRD: CPT | Mod: CPTII,S$GLB,, | Performed by: INTERNAL MEDICINE

## 2023-11-27 PROCEDURE — 3078F PR MOST RECENT DIASTOLIC BLOOD PRESSURE < 80 MM HG: ICD-10-PCS | Mod: CPTII,S$GLB,, | Performed by: INTERNAL MEDICINE

## 2023-11-27 PROCEDURE — 1160F PR REVIEW ALL MEDS BY PRESCRIBER/CLIN PHARMACIST DOCUMENTED: ICD-10-PCS | Mod: CPTII,S$GLB,, | Performed by: INTERNAL MEDICINE

## 2023-11-27 PROCEDURE — 1126F PR PAIN SEVERITY QUANTIFIED, NO PAIN PRESENT: ICD-10-PCS | Mod: CPTII,S$GLB,, | Performed by: INTERNAL MEDICINE

## 2023-11-27 PROCEDURE — 96417 CHEMO IV INFUS EACH ADDL SEQ: CPT

## 2023-11-27 PROCEDURE — 1101F PR PT FALLS ASSESS DOC 0-1 FALLS W/OUT INJ PAST YR: ICD-10-PCS | Mod: CPTII,S$GLB,, | Performed by: INTERNAL MEDICINE

## 2023-11-27 PROCEDURE — 99999 PR PBB SHADOW E&M-EST. PATIENT-LVL III: ICD-10-PCS | Mod: PBBFAC,,, | Performed by: INTERNAL MEDICINE

## 2023-11-27 PROCEDURE — 3074F SYST BP LT 130 MM HG: CPT | Mod: CPTII,S$GLB,, | Performed by: INTERNAL MEDICINE

## 2023-11-27 PROCEDURE — 3074F PR MOST RECENT SYSTOLIC BLOOD PRESSURE < 130 MM HG: ICD-10-PCS | Mod: CPTII,S$GLB,, | Performed by: INTERNAL MEDICINE

## 2023-11-27 PROCEDURE — 1126F AMNT PAIN NOTED NONE PRSNT: CPT | Mod: CPTII,S$GLB,, | Performed by: INTERNAL MEDICINE

## 2023-11-27 PROCEDURE — 96367 TX/PROPH/DG ADDL SEQ IV INF: CPT

## 2023-11-27 PROCEDURE — 1159F PR MEDICATION LIST DOCUMENTED IN MEDICAL RECORD: ICD-10-PCS | Mod: CPTII,S$GLB,, | Performed by: INTERNAL MEDICINE

## 2023-11-27 RX ORDER — HEPARIN 100 UNIT/ML
500 SYRINGE INTRAVENOUS
Status: CANCELLED | OUTPATIENT
Start: 2023-11-29

## 2023-11-27 RX ORDER — PROCHLORPERAZINE EDISYLATE 5 MG/ML
5 INJECTION INTRAMUSCULAR; INTRAVENOUS ONCE AS NEEDED
Status: DISCONTINUED | OUTPATIENT
Start: 2023-11-27 | End: 2023-11-27 | Stop reason: HOSPADM

## 2023-11-27 RX ORDER — ATROPINE SULFATE 0.4 MG/ML
0.4 INJECTION, SOLUTION ENDOTRACHEAL; INTRAMEDULLARY; INTRAMUSCULAR; INTRAVENOUS; SUBCUTANEOUS ONCE AS NEEDED
Status: COMPLETED | OUTPATIENT
Start: 2023-11-27 | End: 2023-11-27

## 2023-11-27 RX ORDER — EPINEPHRINE 0.3 MG/.3ML
0.3 INJECTION SUBCUTANEOUS ONCE AS NEEDED
Status: DISCONTINUED | OUTPATIENT
Start: 2023-11-27 | End: 2023-11-27 | Stop reason: HOSPADM

## 2023-11-27 RX ORDER — DIPHENHYDRAMINE HYDROCHLORIDE 50 MG/ML
50 INJECTION INTRAMUSCULAR; INTRAVENOUS ONCE AS NEEDED
Status: DISCONTINUED | OUTPATIENT
Start: 2023-11-27 | End: 2023-11-27 | Stop reason: HOSPADM

## 2023-11-27 RX ORDER — SODIUM CHLORIDE 0.9 % (FLUSH) 0.9 %
10 SYRINGE (ML) INJECTION
Status: DISCONTINUED | OUTPATIENT
Start: 2023-11-27 | End: 2023-11-27 | Stop reason: HOSPADM

## 2023-11-27 RX ORDER — SODIUM CHLORIDE 0.9 % (FLUSH) 0.9 %
10 SYRINGE (ML) INJECTION
Status: CANCELLED | OUTPATIENT
Start: 2023-11-29

## 2023-11-27 RX ORDER — PROCHLORPERAZINE EDISYLATE 5 MG/ML
5 INJECTION INTRAMUSCULAR; INTRAVENOUS ONCE AS NEEDED
Status: CANCELLED
Start: 2023-11-29

## 2023-11-27 RX ORDER — EPINEPHRINE 0.3 MG/.3ML
0.3 INJECTION SUBCUTANEOUS ONCE AS NEEDED
Status: CANCELLED | OUTPATIENT
Start: 2023-11-29

## 2023-11-27 RX ORDER — HEPARIN 100 UNIT/ML
500 SYRINGE INTRAVENOUS
Status: CANCELLED | OUTPATIENT
Start: 2023-12-01

## 2023-11-27 RX ORDER — ATROPINE SULFATE 0.4 MG/ML
0.4 INJECTION, SOLUTION ENDOTRACHEAL; INTRAMEDULLARY; INTRAMUSCULAR; INTRAVENOUS; SUBCUTANEOUS ONCE AS NEEDED
Status: CANCELLED | OUTPATIENT
Start: 2023-11-29

## 2023-11-27 RX ORDER — DIPHENHYDRAMINE HYDROCHLORIDE 50 MG/ML
50 INJECTION INTRAMUSCULAR; INTRAVENOUS ONCE AS NEEDED
Status: CANCELLED | OUTPATIENT
Start: 2023-11-29

## 2023-11-27 RX ORDER — SODIUM CHLORIDE 0.9 % (FLUSH) 0.9 %
10 SYRINGE (ML) INJECTION
Status: CANCELLED | OUTPATIENT
Start: 2023-12-01

## 2023-11-27 RX ORDER — HEPARIN 100 UNIT/ML
500 SYRINGE INTRAVENOUS
Status: DISCONTINUED | OUTPATIENT
Start: 2023-11-27 | End: 2023-11-27 | Stop reason: HOSPADM

## 2023-11-27 RX ADMIN — IRINOTECAN HYDROCHLORIDE 280 MG: 20 INJECTION, SOLUTION INTRAVENOUS at 03:11

## 2023-11-27 RX ADMIN — SODIUM CHLORIDE: 9 INJECTION, SOLUTION INTRAVENOUS at 01:11

## 2023-11-27 RX ADMIN — BEVACIZUMAB-AWWB 270 MG: 100 INJECTION, SOLUTION INTRAVENOUS at 02:11

## 2023-11-27 RX ADMIN — DEXAMETHASONE SODIUM PHOSPHATE 0.25 MG: 4 INJECTION, SOLUTION INTRA-ARTICULAR; INTRALESIONAL; INTRAMUSCULAR; INTRAVENOUS; SOFT TISSUE at 02:11

## 2023-11-27 RX ADMIN — ATROPINE SULFATE 0.4 MG: 0.4 INJECTION, SOLUTION INTRAVENOUS at 03:11

## 2023-11-27 RX ADMIN — FLUOROURACIL 3840 MG: 50 INJECTION, SOLUTION INTRAVENOUS at 04:11

## 2023-11-27 NOTE — PROGRESS NOTES
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 Bonilla  12/30/2022: Y-90 to R liver  1/27/2023: Y-90 to R liver  5/17/2023: Y-90 to R liver  7/11/2023: C1 FOLFIRI + Pema  7/26/2023: C2 FOLFIRI + Pema  8/8/2023: C3 FOLFIRI + Pema  8/22/23: C4 FOLFIRI + Pema  Restaging CT CAP 9/1/23: Good response to chemo.  9/7/23: C5 FOLFIRI + Pema  9/19/23: C6 FOLFIRI + Pema  10/3/23: C7 FOLFIRI + Pema  10/16/23: C8 FOLFIRI + Pema  10/30/23: C9 FOLFIRI + Pema  11/13/23: C10 FOLFIRI + Pema  11/27/23: C11 FOLFIRI + Pema    Interval History:   He presents today with his daughter prior to cycle 11 of FOLFIRI plus avastin. Feeling well overall. Had a great Thanksgiving with family. Appetite is great. Bowels regular, no nausea or vomiting. Denies fever/chills, SOB, CP, palpitations, pain, blood in urine/stool, paresthesias.     ECOG status is 1. Presents with his daughter today.    Past Medical History:   Past Medical History:   Diagnosis Date    MARIA A (acute kidney injury) 8/18/2022    Hypertension     Metastatic colon cancer to liver 4/22/2021     Past Surgical HIstory:   Past Surgical History:   Procedure Laterality Date    COLONOSCOPY N/A 4/20/2021    Procedure: COLONOSCOPY with possible stent;  Surgeon: EDILMA Bonilla MD;  Location: [facility] ([address] [place] [address]);  Service: Colon and Rectal;   Laterality: N/A;    COLONOSCOPY N/A 11/3/2023    Procedure: COLONOSCOPY;  Surgeon: EDILMA Bonilla MD;  Location: [facility] (4TH FLR);  Service: Endoscopy;  Laterality: N/A;  prep instructions sent to pt via portal  From: EDILMA Bonilla MD  Procedure: Colonoscopy  Diagnosis: Surveillance colonoscopy - Hx of colon cancer  Procedure Timin-12 weeks  Provider: Myself  [place] [facility]  Additional Scheduling Information: No scheduling con    DIAGNOSTIC LAPAROSCOPY N/A 2022    Procedure: LAPAROSCOPY, DIAGNOSTIC;  Surgeon: Adrian Rodriguez MD;  Location: [facility] OR 2ND FLR;  Service: General;  Laterality: N/A;    INSERTION OF TUNNELED CENTRAL VENOUS CATHETER (CVC) WITH SUBCUTANEOUS PORT N/A 5/3/2021    Procedure: [id number]-D-CATH;  Surgeon: Francisco Layton MD;  Location: [facility] OR 2ND FLR;  Service: Vascular;  Laterality: N/A;    OMENTECTOMY N/A 10/20/2022    Procedure: OMENTECTOMY;  Surgeon: EDILMA Bonilla MD;  Location: [facility] OR 2ND FLR;  Service: Colon and Rectal;  Laterality: N/A;    RIGHT HEMICOLECTOMY N/A 10/20/2022    Procedure: HEMICOLECTOMY, RIGHT, extended;  Surgeon: EDILMA Bonilla MD;  Location: [facility] OR 2ND FLR;  Service: Colon and Rectal;  Laterality: N/A;  Extended right hemicolectomy CONSENT IN AM     Family History:   Family History   Problem Relation Age of Onset    Cancer Brother        Social History:  reports that he has never smoked. He has never used smokeless tobacco. He reports that he does not currently use alcohol. He reports that he does not use drugs.    Allergies:  Review of patient's allergies indicates:  No Known Allergies    Medications:  Current Outpatient Medications   Medication Sig Dispense Refill    acetaminophen (TYLENOL) 500 MG tablet Take 1 tablet (500 mg total) by mouth every 6 (six) hours as needed for Pain (alternate with ibuprofen). (Patient not taking: Reported on 2023)  0    amLODIPine (NORVASC) 10 MG tablet Take 1 tablet (10 mg total) by mouth once  daily. 90 tablet 3    capecitabine (XELODA) 500 MG Tab Take 3 tablets (1,500 mg) by mouth twice daily with food on days 1 thru 7 of 14 day cycles. 84 tablet 5    ibuprofen (ADVIL,MOTRIN) 400 MG tablet Take 1 tablet (400 mg total) by mouth every 6 (six) hours as needed for Other (pain). Alternate with tylenol (Patient not taking: Reported on 6/28/2023)      ondansetron (ZOFRAN) 4 MG tablet Take 1 tablet (4 mg total) by mouth every 8 (eight) hours as needed for Nausea. (Patient not taking: Reported on 6/28/2023) 30 tablet 3    oxyCODONE (ROXICODONE) 5 MG immediate release tablet Take 1 tablet (5 mg total) by mouth every 6 (six) hours as needed for Pain. (Patient not taking: Reported on 6/28/2023) 20 tablet 0    tamsulosin (FLOMAX) 0.4 mg Cap Take 1 capsule (0.4 mg total) by mouth once daily. (Patient not taking: Reported on 6/28/2023) 30 capsule 5     No current facility-administered medications for this visit.     Review of Systems   Constitutional:  Positive for fatigue (improved). Negative for activity change, appetite change, chills, diaphoresis, fever and unexpected weight change.   HENT:  Negative for congestion, mouth sores, nosebleeds, postnasal drip, rhinorrhea, trouble swallowing and voice change.    Eyes:  Negative for pain and visual disturbance.   Respiratory:  Negative for cough, chest tightness, shortness of breath and wheezing.    Cardiovascular:  Negative for chest pain, palpitations and leg swelling.   Gastrointestinal:  Negative for abdominal distention, abdominal pain, blood in stool, constipation, diarrhea, nausea and vomiting.   Genitourinary:  Negative for difficulty urinating, dysuria, flank pain, frequency, hematuria and urgency.   Musculoskeletal:  Negative for arthralgias, back pain and myalgias.   Skin:  Negative for rash and wound.   Neurological:  Negative for dizziness, facial asymmetry, weakness, light-headedness, numbness and headaches.   Psychiatric/Behavioral:  Negative for  "agitation, behavioral problems, confusion, decreased concentration, dysphoric mood and sleep disturbance. The patient is not nervous/anxious.    All other systems reviewed and are negative.    ECOG Performance Status: 1     Objective:      Vitals:   Vitals:    11/27/23 1305   BP: 116/67   BP Location: Left arm   Patient Position: Sitting   BP Method: Medium (Automatic)   Pulse: 81   Resp: 18   Temp: 97.8 °F (36.6 °C)   TempSrc: Oral   SpO2: 100%   Weight: 56.7 kg (124 lb 14.3 oz)   Height: 5' 7" (1.702 m)     Physical Exam  Vitals reviewed.   Constitutional:       General: He is not in acute distress.     Appearance: Normal appearance. He is not ill-appearing, toxic-appearing or diaphoretic.   HENT:      Head: Normocephalic and atraumatic.      Right Ear: External ear normal.      Left Ear: External ear normal.      Nose: Nose normal.      Mouth/Throat:      Pharynx: Oropharynx is clear.   Eyes:      General: No scleral icterus.     Extraocular Movements: Extraocular movements intact.      Conjunctiva/sclera: Conjunctivae normal.      Pupils: Pupils are equal, round, and reactive to light.   Cardiovascular:      Rate and Rhythm: Normal rate and regular rhythm.      Pulses: Normal pulses.      Heart sounds: Normal heart sounds. No murmur heard.  Pulmonary:      Effort: Pulmonary effort is normal. No respiratory distress.      Breath sounds: Normal breath sounds. No wheezing.   Chest:      Comments: Port to RCW, no signs of infection.  Abdominal:      General: Abdomen is flat. Bowel sounds are normal. There is no distension.      Palpations: Abdomen is soft. There is no mass.      Tenderness: There is no abdominal tenderness.      Comments: Vertical midline abdominal wound well healed   Musculoskeletal:         General: No swelling or deformity. Normal range of motion.      Right lower leg: No edema.      Left lower leg: No edema.   Skin:     Coloration: Skin is not jaundiced or pale.      Findings: No bruising, " erythema or rash.   Neurological:      General: No focal deficit present.      Mental Status: He is alert and oriented to person, place, and time. Mental status is at baseline.      Cranial Nerves: No cranial nerve deficit.      Sensory: No sensory deficit.      Gait: Gait normal.   Psychiatric:         Mood and Affect: Mood normal.         Behavior: Behavior normal.         Thought Content: Thought content normal.         Judgment: Judgment normal.     Laboratory Data:  Lab Visit on 11/27/2023   Component Date Value Ref Range Status    WBC 11/27/2023 5.70  3.90 - 12.70 K/uL Final    RBC 11/27/2023 4.08 (L)  4.60 - 6.20 M/uL Final    Hemoglobin 11/27/2023 10.7 (L)  14.0 - 18.0 g/dL Final    Hematocrit 11/27/2023 35.5 (L)  40.0 - 54.0 % Final    MCV 11/27/2023 87  82 - 98 fL Final    MCH 11/27/2023 26.2 (L)  27.0 - 31.0 pg Final    MCHC 11/27/2023 30.1 (L)  32.0 - 36.0 g/dL Final    RDW 11/27/2023 19.0 (H)  11.5 - 14.5 % Final    Platelets 11/27/2023 205  150 - 450 K/uL Final    MPV 11/27/2023 9.8  9.2 - 12.9 fL Final    Immature Granulocytes 11/27/2023 0.4  0.0 - 0.5 % Final    Gran # (ANC) 11/27/2023 3.8  1.8 - 7.7 K/uL Final    Immature Grans (Abs) 11/27/2023 0.02  0.00 - 0.04 K/uL Final    Comment: Mild elevation in immature granulocytes is non specific and   can be seen in a variety of conditions including stress response,   acute inflammation, trauma and pregnancy. Correlation with other   laboratory and clinical findings is essential.      Lymph # 11/27/2023 1.0  1.0 - 4.8 K/uL Final    Mono # 11/27/2023 0.8  0.3 - 1.0 K/uL Final    Eos # 11/27/2023 0.1  0.0 - 0.5 K/uL Final    Baso # 11/27/2023 0.03  0.00 - 0.20 K/uL Final    nRBC 11/27/2023 0  0 /100 WBC Final    Gran % 11/27/2023 66.7  38.0 - 73.0 % Final    Lymph % 11/27/2023 17.4 (L)  18.0 - 48.0 % Final    Mono % 11/27/2023 13.2  4.0 - 15.0 % Final    Eosinophil % 11/27/2023 1.8  0.0 - 8.0 % Final    Basophil % 11/27/2023 0.5  0.0 - 1.9 % Final     Differential Method 11/27/2023 Automated   Final    Sodium 11/27/2023 142  136 - 145 mmol/L Final    Potassium 11/27/2023 3.6  3.5 - 5.1 mmol/L Final    Chloride 11/27/2023 107  95 - 110 mmol/L Final    CO2 11/27/2023 27  23 - 29 mmol/L Final    Glucose 11/27/2023 107  70 - 110 mg/dL Final    BUN 11/27/2023 12  8 - 23 mg/dL Final    Creatinine 11/27/2023 0.9  0.5 - 1.4 mg/dL Final    Calcium 11/27/2023 9.3  8.7 - 10.5 mg/dL Final    Total Protein 11/27/2023 6.8  6.0 - 8.4 g/dL Final    Albumin 11/27/2023 3.1 (L)  3.5 - 5.2 g/dL Final    Total Bilirubin 11/27/2023 1.1 (H)  0.1 - 1.0 mg/dL Final    Comment: For infants and newborns, interpretation of results should be based  on gestational age, weight and in agreement with clinical  observations.    Premature Infant recommended reference ranges:  Up to 24 hours.............<8.0 mg/dL  Up to 48 hours............<12.0 mg/dL  3-5 days..................<15.0 mg/dL  6-29 days.................<15.0 mg/dL      Alkaline Phosphatase 11/27/2023 150 (H)  55 - 135 U/L Final    AST 11/27/2023 31  10 - 40 U/L Final    ALT 11/27/2023 24  10 - 44 U/L Final    eGFR 11/27/2023 >60.0  >60 mL/min/1.73 m^2 Final    Anion Gap 11/27/2023 8  8 - 16 mmol/L Final    CEA 11/27/2023 4.5  0.0 - 5.0 ng/mL Final    Comment: CEA Normal Range:  Non-Smokers: 0-3.0 ng/mL  Smokers:     0-5.0 ng/mL    The testing method is a chemiluminescent microparticle immunoassay   manufactured by [employer] and performed on the    or   easy2map system. Values obtained with different assay manufacturers   for   methods may be different and cannot be used interchangeably.       Assessment and Plan        1. 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 Immunodeficiency secondary to neoplasm    4. Primary hypertension    5. MARIA A (acute kidney injury)    6. Anemia associated with chemotherapy    7. Pain    8. Drug-induced constipation    9. Weight decrease    10.  Severe malnutrition    11. Lower urinary tract symptoms (LUTS)    12. Cutaneous abscess of neck      1-3.  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  Guardant 360 was negative for BRAF, VIRI, HER2 amplification.   Pretreatment CEA 57.  We had a long and sonia discussion with him about his diagnosis. Unfortunately, the disease is not curable but remains treatable and he has good performance status.   Restaging scans after 7 cycles of FOLFOX + Pema showed significant reduction in colonic mass and liver mass.   we switched to maintenance as of cycle 8 with 5FU + Pema  Restaging scans from March 2022 show stable disease.   MRI on 7/18/22 showing hepatic progression of disease in the right hepatic lobe noted on the exam. CT CAP on 8/26 shows some mild progression in both liver metastases.    Discussed case at colorectal tumor board 8/31/22 and had a diagnostic lap performed by Dr. Rodriguez on 9/7 to assess for any occult peritoneal disease. Findings from the diagnostic lap were negative. Fluid from around from around the primary mass was sent for cytology that was negative for malignancy.   Underwent primary tumor resection on 10/20/22 with Dr. Bonilla.    Meanwhile his liver mets had grown.  We discussed his case at [facility] conference with plans for short term Y-90 and then restarting chemotherapy prior to considering any surgical options to his liver metastases.  He underwent Y-90 delivery on 12/30/22. Tolerated well.   CT CAP on 1/23/23 reviewed at [facility] conference with good response to Y-90, no new lesions.  Underwent second Y-90 on 1/27/23. Can consider R hepatectomy pending follow-up imaging after Y-90.     Received cycle 42 of FOLFOX (omitted oxaliplatin again starting cycle 41 due to neuropathy) on 2/9/23.  Because of 5-FU shortage nationally, we have been holding chemotherapy since mid-February 2023.  If he needs to restart chemotherapy (I.e. no plans for surgical resection), will  place him on capecitabine maintenance for duration of 5-FU shortage.     Discussed at colorectal liver mets tumor board 3/16/23.  Plan for repeat Y-90 and then consideration of surgical resection and he will meet with liver transplant team as well.  Underwent Y-90 delivery 5/17/23 with IR.     Has been on maintenance Xeloda since late April 2023.    Met with liver transplant team but ultimately opted not to proceed with transplant as he was concerned about how he would tolerate a major surgery with the life changes that would come after transplant as well. They closed out his case.    He met with Dr. Rodriguez to discuss whether surgical resection is indicated for his liver mets.  He recommended repeat MRI.  Repeat MRI unfortunately showed disease progression while on Xeloda maintenance.  Similarly his CEA garrett precipitously, all in keeping with worsening disease.    We recommended we restart IV chemotherapy with FOLFIRI + Avastin (never had irinotecan).    Because of hyperbilirubinemia he received cycle 1 with just 5-FU + Avastin on 7/11/23. Tolerated this well. Bilirubin has improved.  Received irinotecan starting with cycle 2.  Repeat CT CAP after cycle 4 shows good response to therapy.   Excellent tolerance. [facility] tumor board agrees with continuation of chemotherapy.   Repeat CT CAP after cycle 7 shows stable disease, no evidence of new or worsening disease.      Doing well today.   Labs reviewed, adequate for chemo.   Will proceed with cycle 11 today.    CEA has been downtrending, continue to monitor.      -RTC in 2 weeks for next cycle with UA. Will repeat imaging studies after cycle 11.    Tempus: APC, CKS1B cng, ERCC3 cnl, PIK3CA; KENIA, TMB 12.1.    4. Hypertension   -- BP well controlled today. Feels well.   -- taking Amlodipine .   -- Following with PCP.   -- encouraged him and his daughter to monitor BP and HR closely at home.     5. MARIA A  -- Resolved during previous visit and Cr was normal.   -- Monitor.  Encouraged continued hydration particularly with working outside.     6. Anemia  -- Hgb stable. Monitor.  -- No signs of bleeding. Platelets normal.     7-10. Neoplasm related pain, weight loss, constipation  -- Pain very well controlled. Has oxycodone 5mg to use PRN but not requiring now.  -- Following with nutrition. Encouraged increased protein. Monitor.  -- Continue Miralax daily for constipation.    11, 12. Urinary hesitancy, boil  -- Continue Flomax.  -- Reports improvement since starting medication.   -- Boil to neck well healed. Monitor for recurrent symptoms.     Patient is in agreement with the proposed treatment plan. All questions were answered to the patient's satisfaction. Pt knows to call clinic if anything is needed before the next clinic visit.    Patient discussed with and seen in conjunction with collaborating physician, Dr. Schuster.    At least 40 minutes were spent today on this encounter including face to face time with the patient, data gathering/interpretation and documentation.       Natividad Maher, MSN, APRN, ACCNS-AG  Hematology and Medical Oncology  Clinical Nurse Specialist to Dr. Schuster, Dr. Quijano & Dr. Mueller    Route Chart for Scheduling    Med Onc Chart Routing      Follow up with physician . Please add UA prior to upcoming appointments   Follow up with [facility]    Infusion scheduling note    Injection scheduling note    Labs    Imaging    Pharmacy appointment    Other referrals

## 2023-11-27 NOTE — PLAN OF CARE
1400-Labs , hx, and medications reviewed, patient was seen in clinic prior to arrival. Assessment completed. Discussed plan of care with patient. Patient in agreement. Chair reclined and warm blanket and snack offered.

## 2023-11-27 NOTE — PLAN OF CARE
1702- Portable pump infusing on discharge all clamps open and connections secured. No kinks in tubing noted. Settings double checked by MARÍA ELENA Chao RN. Educated patient to be sure pump should say run and volume infused number should increase daily as reservoir volume decreases. Instructed to call with any issues with pump. Phone numbers given. AVS given.  Instructed to call provider for any questions or concerns. Patient will return for 245 on Wednesday for pump dc.

## 2023-11-29 ENCOUNTER — OFFICE VISIT (OUTPATIENT)
Dept: SURGERY | Facility: CLINIC | Age: 73
End: 2023-11-29
Payer: MEDICARE

## 2023-11-29 ENCOUNTER — INFUSION (OUTPATIENT)
Dept: INFUSION THERAPY | Facility: HOSPITAL | Age: 73
End: 2023-11-29
Payer: MEDICARE

## 2023-11-29 VITALS
RESPIRATION RATE: 16 BRPM | DIASTOLIC BLOOD PRESSURE: 59 MMHG | SYSTOLIC BLOOD PRESSURE: 133 MMHG | OXYGEN SATURATION: 98 % | TEMPERATURE: 99 F | HEART RATE: 78 BPM

## 2023-11-29 VITALS
HEART RATE: 79 BPM | SYSTOLIC BLOOD PRESSURE: 116 MMHG | DIASTOLIC BLOOD PRESSURE: 74 MMHG | RESPIRATION RATE: 16 BRPM | WEIGHT: 121.5 LBS | BODY MASS INDEX: 19.07 KG/M2 | HEIGHT: 67 IN

## 2023-11-29 DIAGNOSIS — C78.7 METASTATIC COLON CANCER TO LIVER: Primary | ICD-10-CM

## 2023-11-29 DIAGNOSIS — Z08 ENCOUNTER FOR FOLLOW-UP SURVEILLANCE OF COLON CANCER: Primary | ICD-10-CM

## 2023-11-29 DIAGNOSIS — C18.9 METASTATIC COLON CANCER TO LIVER: Primary | ICD-10-CM

## 2023-11-29 DIAGNOSIS — Z85.038 ENCOUNTER FOR FOLLOW-UP SURVEILLANCE OF COLON CANCER: Primary | ICD-10-CM

## 2023-11-29 PROCEDURE — 1126F PR PAIN SEVERITY QUANTIFIED, NO PAIN PRESENT: ICD-10-PCS | Mod: CPTII,S$GLB,, | Performed by: COLON & RECTAL SURGERY

## 2023-11-29 PROCEDURE — 25000003 PHARM REV CODE 250: Performed by: REGISTERED NURSE

## 2023-11-29 PROCEDURE — A4216 STERILE WATER/SALINE, 10 ML: HCPCS | Performed by: REGISTERED NURSE

## 2023-11-29 PROCEDURE — 3078F DIAST BP <80 MM HG: CPT | Mod: CPTII,S$GLB,, | Performed by: COLON & RECTAL SURGERY

## 2023-11-29 PROCEDURE — 3288F PR FALLS RISK ASSESSMENT DOCUMENTED: ICD-10-PCS | Mod: CPTII,S$GLB,, | Performed by: COLON & RECTAL SURGERY

## 2023-11-29 PROCEDURE — 3074F PR MOST RECENT SYSTOLIC BLOOD PRESSURE < 130 MM HG: ICD-10-PCS | Mod: CPTII,S$GLB,, | Performed by: COLON & RECTAL SURGERY

## 2023-11-29 PROCEDURE — 1101F PR PT FALLS ASSESS DOC 0-1 FALLS W/OUT INJ PAST YR: ICD-10-PCS | Mod: CPTII,S$GLB,, | Performed by: COLON & RECTAL SURGERY

## 2023-11-29 PROCEDURE — 3008F BODY MASS INDEX DOCD: CPT | Mod: CPTII,S$GLB,, | Performed by: COLON & RECTAL SURGERY

## 2023-11-29 PROCEDURE — 1159F PR MEDICATION LIST DOCUMENTED IN MEDICAL RECORD: ICD-10-PCS | Mod: CPTII,S$GLB,, | Performed by: COLON & RECTAL SURGERY

## 2023-11-29 PROCEDURE — 1126F AMNT PAIN NOTED NONE PRSNT: CPT | Mod: CPTII,S$GLB,, | Performed by: COLON & RECTAL SURGERY

## 2023-11-29 PROCEDURE — 99999 PR PBB SHADOW E&M-EST. PATIENT-LVL III: ICD-10-PCS | Mod: PBBFAC,,, | Performed by: COLON & RECTAL SURGERY

## 2023-11-29 PROCEDURE — 3008F PR BODY MASS INDEX (BMI) DOCUMENTED: ICD-10-PCS | Mod: CPTII,S$GLB,, | Performed by: COLON & RECTAL SURGERY

## 2023-11-29 PROCEDURE — 3074F SYST BP LT 130 MM HG: CPT | Mod: CPTII,S$GLB,, | Performed by: COLON & RECTAL SURGERY

## 2023-11-29 PROCEDURE — 99999 PR PBB SHADOW E&M-EST. PATIENT-LVL III: CPT | Mod: PBBFAC,,, | Performed by: COLON & RECTAL SURGERY

## 2023-11-29 PROCEDURE — 99213 PR OFFICE/OUTPT VISIT, EST, LEVL III, 20-29 MIN: ICD-10-PCS | Mod: S$GLB,,, | Performed by: COLON & RECTAL SURGERY

## 2023-11-29 PROCEDURE — 1101F PT FALLS ASSESS-DOCD LE1/YR: CPT | Mod: CPTII,S$GLB,, | Performed by: COLON & RECTAL SURGERY

## 2023-11-29 PROCEDURE — 3288F FALL RISK ASSESSMENT DOCD: CPT | Mod: CPTII,S$GLB,, | Performed by: COLON & RECTAL SURGERY

## 2023-11-29 PROCEDURE — 63600175 PHARM REV CODE 636 W HCPCS: Performed by: REGISTERED NURSE

## 2023-11-29 PROCEDURE — 3078F PR MOST RECENT DIASTOLIC BLOOD PRESSURE < 80 MM HG: ICD-10-PCS | Mod: CPTII,S$GLB,, | Performed by: COLON & RECTAL SURGERY

## 2023-11-29 PROCEDURE — 1159F MED LIST DOCD IN RCRD: CPT | Mod: CPTII,S$GLB,, | Performed by: COLON & RECTAL SURGERY

## 2023-11-29 PROCEDURE — 99213 OFFICE O/P EST LOW 20 MIN: CPT | Mod: S$GLB,,, | Performed by: COLON & RECTAL SURGERY

## 2023-11-29 RX ORDER — HEPARIN 100 UNIT/ML
500 SYRINGE INTRAVENOUS
Status: DISCONTINUED | OUTPATIENT
Start: 2023-11-29 | End: 2023-11-29 | Stop reason: HOSPADM

## 2023-11-29 RX ORDER — SODIUM CHLORIDE 0.9 % (FLUSH) 0.9 %
10 SYRINGE (ML) INJECTION
Status: DISCONTINUED | OUTPATIENT
Start: 2023-11-29 | End: 2023-11-29 | Stop reason: HOSPADM

## 2023-11-29 RX ADMIN — Medication 10 ML: at 02:11

## 2023-11-29 RX ADMIN — HEPARIN 500 UNITS: 100 SYRINGE at 02:11

## 2023-11-29 NOTE — PROGRESS NOTES
HPI:  Javier Downs is a 73 y.o. male with history of hepatic flexure cancer, metastatic to liver     10-  right colectomy, omentectomy    Findings:  1. Transverse colon cancer directly involving greater omentum.   2.  Significant adenopathy along SMA.    3.  Anastomosis of the neoterminal ileum and the distal transverse colon via end ileum to side of transverse colon (29 CDH stapler).    4.  Large right lobe of liver metastasis.  No peritoneal disease    10/25/23  Interval History :   Presents for 6 month follow up. Doing well. No new complaints. No changes in bowel habits. Has 2 formed stools/day. No blood. Has gained some weight. Eating/drinking well.      Was found to have progression of liver disease after y90 treatments in June. He was started on systemic chemotherapy (FOLFIRI + Avastin). He received cycle 8 10/18.        11-  colonoscopy  Findings:       The perianal and digital rectal examinations were normal.        Multiple small and large-mouthed diverticula were found in the        sigmoid colon and descending colon.        There was evidence of a prior end-to-side ileo-colonic anastomosis        in the proximal transverse colon. This was patent and was        characterized by healthy appearing mucosa and an intact appearance.        The anastomosis was traversed. No biopsies or other specimens were        collected for this exam.        The ryan-terminal ileum appeared normal.     11/29/2023 interval history   No problems since colonoscopy.  No abdominal pain.  No rectal bleeding.  Appetite is good.  Not losing weight.  No nausea or vomiting.  No abdominal pain.    Tolerating chemotherapy well  Past Medical History:   Diagnosis Date    MARIA A (acute kidney injury) 8/18/2022    Hypertension     Metastatic colon cancer to liver 4/22/2021        Past Surgical History:   Procedure Laterality Date    COLONOSCOPY N/A 4/20/2021    Procedure: COLONOSCOPY with possible stent;  Surgeon: EDILMA Bonilla,  MD;  Location: [facility] ENDO (2ND FLR);  Service: Colon and Rectal;  Laterality: N/A;    COLONOSCOPY N/A 11/3/2023    Procedure: COLONOSCOPY;  Surgeon: EDILMA Bonilla MD;  Location: [facility] ENDO (4TH FLR);  Service: Endoscopy;  Laterality: N/A;  prep instructions sent to pt via portal  From: EDILMA Bonilla MD  Procedure: Colonoscopy  Diagnosis: Surveillance colonoscopy - Hx of colon cancer  Procedure Timin-12 weeks  Provider: Myself  Location: [facility] 4-Endo  Additional Scheduling Information: No scheduling con    DIAGNOSTIC LAPAROSCOPY N/A 2022    Procedure: LAPAROSCOPY, DIAGNOSTIC;  Surgeon: Adrian Rodriguez MD;  Location: [facility] OR 2ND FLR;  Service: General;  Laterality: N/A;    INSERTION OF TUNNELED CENTRAL VENOUS CATHETER (CVC) WITH SUBCUTANEOUS PORT N/A 5/3/2021    Procedure: [id number];  Surgeon: Francisco Layton MD;  Location: [facility] OR 2ND FLR;  Service: Vascular;  Laterality: N/A;    OMENTECTOMY N/A 10/20/2022    Procedure: OMENTECTOMY;  Surgeon: EDILMA Bonilla MD;  Location: [facility] OR 2ND FLR;  Service: Colon and Rectal;  Laterality: N/A;    RIGHT HEMICOLECTOMY N/A 10/20/2022    Procedure: HEMICOLECTOMY, RIGHT, extended;  Surgeon: EDILMA Bonilla MD;  Location: [facility] OR 2ND FLR;  Service: Colon and Rectal;  Laterality: N/A;  Extended right hemicolectomy CONSENT IN AM       Review of patient's allergies indicates:  No Known Allergies    Family History   Problem Relation Age of Onset    Cancer Brother        Social History     Socioeconomic History    Marital status:    Tobacco Use    Smoking status: Never    Smokeless tobacco: Never   Substance and Sexual Activity    Alcohol use: Not Currently    Drug use: Never    Sexual activity: Not Currently     Partners: Female       ROS:  GENERAL: No fever, chills, fatigability or weight loss.  Integument: No rashes, redness, icterus  CHEST: Denies VARGAS, cyanosis, wheezing, cough and sputum production.  CARDIOVASCULAR: Denies chest pain, PND,  "orthopnea or reduced exercise tolerance.  GI: Denies abd pain, dysphagia, nausea, vomiting, no hematemesis   : Denies burning on urination, no hematuria, no bacteriuria  MSK: No deformities, swelling, joint pain swelling  Neurologic: No HAs, seizures, weakness, paresthesias, gait problems    PE:  General appearance thin male in NAD  /74 (BP Location: Left arm, Patient Position: Sitting, BP Method: Large (Automatic))   Pulse 79   Resp 16   Ht 5' 7" (1.702 m)   Wt 55.1 kg (121 lb 7.6 oz)   BMI 19.03 kg/m²   Sclera/ Skin anicteric  AT NC EOMI  Neck supple trachea midline   Chest symmetric, nl excursion, no retractions, breathing comfortably  EXT - no CCE  Neuro:  Mood/ affect nl, alert and oriented x 3, moves all ext's, gait nl        Assessment:  Stage IV colon cancer with liver metastasis, on palliative chemotherapy  Anastomosis well healed without evidence of local luminal recurrence or new polyps on colonoscopy.  Good functional status    Plan:  Follow-up with   RTC prn      "

## 2023-11-29 NOTE — PLAN OF CARE
Pt had home infusion pump d/c'd. Pump reservoir chamber was 0 pt tolerated medication well and had 0 complaints. VS were stable. Pt discharged AAOx4 and ambulatory

## 2023-12-08 ENCOUNTER — HOSPITAL ENCOUNTER (OUTPATIENT)
Dept: RADIOLOGY | Facility: HOSPITAL | Age: 73
Discharge: HOME | End: 2023-12-08
Attending: PHYSICIAN ASSISTANT
Payer: MEDICARE

## 2023-12-08 DIAGNOSIS — C78.7 METASTATIC COLON CANCER TO LIVER: ICD-10-CM

## 2023-12-08 DIAGNOSIS — C18.9 METASTATIC COLON CANCER TO LIVER: ICD-10-CM

## 2023-12-08 PROCEDURE — 74177 CT ABD & PELVIS W/CONTRAST: CPT | Mod: 26,,, | Performed by: STUDENT IN AN ORGANIZED HEALTH CARE EDUCATION/TRAINING PROGRAM

## 2023-12-08 PROCEDURE — 71260 CT CHEST ABDOMEN PELVIS WITH IV CONTRAST (XPD): ICD-10-PCS | Mod: 26,,, | Performed by: STUDENT IN AN ORGANIZED HEALTH CARE EDUCATION/TRAINING PROGRAM

## 2023-12-08 PROCEDURE — 25500020 PHARM REV CODE 255: Performed by: PHYSICIAN ASSISTANT

## 2023-12-08 PROCEDURE — 71260 CT THORAX DX C+: CPT | Mod: TC

## 2023-12-08 PROCEDURE — 71260 CT THORAX DX C+: CPT | Mod: 26,,, | Performed by: STUDENT IN AN ORGANIZED HEALTH CARE EDUCATION/TRAINING PROGRAM

## 2023-12-08 PROCEDURE — 74177 CT CHEST ABDOMEN PELVIS WITH IV CONTRAST (XPD): ICD-10-PCS | Mod: 26,,, | Performed by: STUDENT IN AN ORGANIZED HEALTH CARE EDUCATION/TRAINING PROGRAM

## 2023-12-08 PROCEDURE — 74177 CT ABD & PELVIS W/CONTRAST: CPT | Mod: TC

## 2023-12-08 RX ADMIN — IOHEXOL 75 ML: 350 INJECTION, SOLUTION INTRAVENOUS at 10:12

## 2023-12-11 ENCOUNTER — OFFICE VISIT (OUTPATIENT)
Dept: HEMATOLOGY/ONCOLOGY | Facility: CLINIC | Age: 73
End: 2023-12-11
Payer: MEDICARE

## 2023-12-11 ENCOUNTER — INFUSION (OUTPATIENT)
Dept: INFUSION THERAPY | Facility: HOSPITAL | Age: 73
End: 2023-12-11
Payer: MEDICARE

## 2023-12-11 VITALS
BODY MASS INDEX: 21.33 KG/M2 | TEMPERATURE: 98 F | RESPIRATION RATE: 16 BRPM | HEART RATE: 72 BPM | HEIGHT: 65 IN | WEIGHT: 128 LBS | DIASTOLIC BLOOD PRESSURE: 87 MMHG | OXYGEN SATURATION: 100 % | SYSTOLIC BLOOD PRESSURE: 141 MMHG

## 2023-12-11 VITALS
TEMPERATURE: 97 F | BODY MASS INDEX: 21.33 KG/M2 | WEIGHT: 128 LBS | RESPIRATION RATE: 18 BRPM | HEART RATE: 68 BPM | HEIGHT: 65 IN | SYSTOLIC BLOOD PRESSURE: 119 MMHG | DIASTOLIC BLOOD PRESSURE: 73 MMHG

## 2023-12-11 DIAGNOSIS — R39.9 LOWER URINARY TRACT SYMPTOMS (LUTS): ICD-10-CM

## 2023-12-11 DIAGNOSIS — I10 HYPERTENSION, UNSPECIFIED TYPE: ICD-10-CM

## 2023-12-11 DIAGNOSIS — C18.9 METASTATIC COLON CANCER TO LIVER: Primary | ICD-10-CM

## 2023-12-11 DIAGNOSIS — Z79.899 IMMUNODEFICIENCY DUE TO CHEMOTHERAPY: ICD-10-CM

## 2023-12-11 DIAGNOSIS — E43 SEVERE MALNUTRITION: ICD-10-CM

## 2023-12-11 DIAGNOSIS — C78.7 METASTATIC COLON CANCER TO LIVER: Primary | ICD-10-CM

## 2023-12-11 DIAGNOSIS — R63.4 WEIGHT DECREASE: ICD-10-CM

## 2023-12-11 DIAGNOSIS — T45.1X5A IMMUNODEFICIENCY DUE TO CHEMOTHERAPY: ICD-10-CM

## 2023-12-11 DIAGNOSIS — D84.821 IMMUNODEFICIENCY DUE TO CHEMOTHERAPY: ICD-10-CM

## 2023-12-11 DIAGNOSIS — D64.81 ANEMIA ASSOCIATED WITH CHEMOTHERAPY: ICD-10-CM

## 2023-12-11 DIAGNOSIS — T45.1X5A ANEMIA ASSOCIATED WITH CHEMOTHERAPY: ICD-10-CM

## 2023-12-11 DIAGNOSIS — D49.9 IMMUNODEFICIENCY SECONDARY TO NEOPLASM: ICD-10-CM

## 2023-12-11 DIAGNOSIS — N17.9 AKI (ACUTE KIDNEY INJURY): ICD-10-CM

## 2023-12-11 DIAGNOSIS — R52 PAIN: ICD-10-CM

## 2023-12-11 DIAGNOSIS — D84.81 IMMUNODEFICIENCY SECONDARY TO NEOPLASM: ICD-10-CM

## 2023-12-11 DIAGNOSIS — K59.03 DRUG-INDUCED CONSTIPATION: ICD-10-CM

## 2023-12-11 PROCEDURE — 96413 CHEMO IV INFUSION 1 HR: CPT

## 2023-12-11 PROCEDURE — 25000003 PHARM REV CODE 250: Performed by: INTERNAL MEDICINE

## 2023-12-11 PROCEDURE — 3079F DIAST BP 80-89 MM HG: CPT | Mod: CPTII,S$GLB,, | Performed by: INTERNAL MEDICINE

## 2023-12-11 PROCEDURE — 1126F AMNT PAIN NOTED NONE PRSNT: CPT | Mod: CPTII,S$GLB,, | Performed by: INTERNAL MEDICINE

## 2023-12-11 PROCEDURE — 3288F FALL RISK ASSESSMENT DOCD: CPT | Mod: CPTII,S$GLB,, | Performed by: INTERNAL MEDICINE

## 2023-12-11 PROCEDURE — 99999 PR PBB SHADOW E&M-EST. PATIENT-LVL III: CPT | Mod: PBBFAC,,, | Performed by: INTERNAL MEDICINE

## 2023-12-11 PROCEDURE — 3008F PR BODY MASS INDEX (BMI) DOCUMENTED: ICD-10-PCS | Mod: CPTII,S$GLB,, | Performed by: INTERNAL MEDICINE

## 2023-12-11 PROCEDURE — 1101F PR PT FALLS ASSESS DOC 0-1 FALLS W/OUT INJ PAST YR: ICD-10-PCS | Mod: CPTII,S$GLB,, | Performed by: INTERNAL MEDICINE

## 2023-12-11 PROCEDURE — 3077F SYST BP >= 140 MM HG: CPT | Mod: CPTII,S$GLB,, | Performed by: INTERNAL MEDICINE

## 2023-12-11 PROCEDURE — 3077F PR MOST RECENT SYSTOLIC BLOOD PRESSURE >= 140 MM HG: ICD-10-PCS | Mod: CPTII,S$GLB,, | Performed by: INTERNAL MEDICINE

## 2023-12-11 PROCEDURE — 3288F PR FALLS RISK ASSESSMENT DOCUMENTED: ICD-10-PCS | Mod: CPTII,S$GLB,, | Performed by: INTERNAL MEDICINE

## 2023-12-11 PROCEDURE — 3079F PR MOST RECENT DIASTOLIC BLOOD PRESSURE 80-89 MM HG: ICD-10-PCS | Mod: CPTII,S$GLB,, | Performed by: INTERNAL MEDICINE

## 2023-12-11 PROCEDURE — 96417 CHEMO IV INFUS EACH ADDL SEQ: CPT

## 2023-12-11 PROCEDURE — 1126F PR PAIN SEVERITY QUANTIFIED, NO PAIN PRESENT: ICD-10-PCS | Mod: CPTII,S$GLB,, | Performed by: INTERNAL MEDICINE

## 2023-12-11 PROCEDURE — 3008F BODY MASS INDEX DOCD: CPT | Mod: CPTII,S$GLB,, | Performed by: INTERNAL MEDICINE

## 2023-12-11 PROCEDURE — 96375 TX/PRO/DX INJ NEW DRUG ADDON: CPT

## 2023-12-11 PROCEDURE — 99215 OFFICE O/P EST HI 40 MIN: CPT | Mod: S$GLB,,, | Performed by: INTERNAL MEDICINE

## 2023-12-11 PROCEDURE — 63600175 PHARM REV CODE 636 W HCPCS: Performed by: INTERNAL MEDICINE

## 2023-12-11 PROCEDURE — 96367 TX/PROPH/DG ADDL SEQ IV INF: CPT

## 2023-12-11 PROCEDURE — 1101F PT FALLS ASSESS-DOCD LE1/YR: CPT | Mod: CPTII,S$GLB,, | Performed by: INTERNAL MEDICINE

## 2023-12-11 PROCEDURE — 96416 CHEMO PROLONG INFUSE W/PUMP: CPT

## 2023-12-11 PROCEDURE — 99215 PR OFFICE/OUTPT VISIT, EST, LEVL V, 40-54 MIN: ICD-10-PCS | Mod: S$GLB,,, | Performed by: INTERNAL MEDICINE

## 2023-12-11 PROCEDURE — 99999 PR PBB SHADOW E&M-EST. PATIENT-LVL III: ICD-10-PCS | Mod: PBBFAC,,, | Performed by: INTERNAL MEDICINE

## 2023-12-11 RX ORDER — DIPHENHYDRAMINE HYDROCHLORIDE 50 MG/ML
50 INJECTION INTRAMUSCULAR; INTRAVENOUS ONCE AS NEEDED
Status: DISCONTINUED | OUTPATIENT
Start: 2023-12-11 | End: 2023-12-11 | Stop reason: HOSPADM

## 2023-12-11 RX ORDER — EPINEPHRINE 0.3 MG/.3ML
0.3 INJECTION SUBCUTANEOUS ONCE AS NEEDED
Status: DISCONTINUED | OUTPATIENT
Start: 2023-12-11 | End: 2023-12-11 | Stop reason: HOSPADM

## 2023-12-11 RX ORDER — PROCHLORPERAZINE EDISYLATE 5 MG/ML
5 INJECTION INTRAMUSCULAR; INTRAVENOUS ONCE AS NEEDED
Status: DISCONTINUED | OUTPATIENT
Start: 2023-12-11 | End: 2023-12-11 | Stop reason: HOSPADM

## 2023-12-11 RX ORDER — ATROPINE SULFATE 0.4 MG/ML
0.4 INJECTION, SOLUTION ENDOTRACHEAL; INTRAMEDULLARY; INTRAMUSCULAR; INTRAVENOUS; SUBCUTANEOUS ONCE AS NEEDED
Status: CANCELLED | OUTPATIENT
Start: 2023-12-13

## 2023-12-11 RX ORDER — SODIUM CHLORIDE 0.9 % (FLUSH) 0.9 %
10 SYRINGE (ML) INJECTION
Status: DISCONTINUED | OUTPATIENT
Start: 2023-12-11 | End: 2023-12-11 | Stop reason: HOSPADM

## 2023-12-11 RX ORDER — AMLODIPINE BESYLATE 10 MG/1
10 TABLET ORAL DAILY
Qty: 90 TABLET | Refills: 3 | Status: SHIPPED | OUTPATIENT
Start: 2023-12-11

## 2023-12-11 RX ORDER — ATROPINE SULFATE 0.4 MG/ML
0.4 INJECTION, SOLUTION ENDOTRACHEAL; INTRAMEDULLARY; INTRAMUSCULAR; INTRAVENOUS; SUBCUTANEOUS ONCE AS NEEDED
Status: COMPLETED | OUTPATIENT
Start: 2023-12-11 | End: 2023-12-11

## 2023-12-11 RX ORDER — HEPARIN 100 UNIT/ML
500 SYRINGE INTRAVENOUS
Status: CANCELLED | OUTPATIENT
Start: 2023-12-13

## 2023-12-11 RX ORDER — SODIUM CHLORIDE 0.9 % (FLUSH) 0.9 %
10 SYRINGE (ML) INJECTION
Status: CANCELLED | OUTPATIENT
Start: 2023-12-13

## 2023-12-11 RX ORDER — DIPHENHYDRAMINE HYDROCHLORIDE 50 MG/ML
50 INJECTION INTRAMUSCULAR; INTRAVENOUS ONCE AS NEEDED
Status: CANCELLED | OUTPATIENT
Start: 2023-12-13

## 2023-12-11 RX ORDER — EPINEPHRINE 0.3 MG/.3ML
0.3 INJECTION SUBCUTANEOUS ONCE AS NEEDED
Status: CANCELLED | OUTPATIENT
Start: 2023-12-13

## 2023-12-11 RX ORDER — SODIUM CHLORIDE 0.9 % (FLUSH) 0.9 %
10 SYRINGE (ML) INJECTION
Status: CANCELLED | OUTPATIENT
Start: 2023-12-15

## 2023-12-11 RX ORDER — PROCHLORPERAZINE EDISYLATE 5 MG/ML
5 INJECTION INTRAMUSCULAR; INTRAVENOUS ONCE AS NEEDED
Status: CANCELLED
Start: 2023-12-13

## 2023-12-11 RX ORDER — HEPARIN 100 UNIT/ML
500 SYRINGE INTRAVENOUS
Status: DISCONTINUED | OUTPATIENT
Start: 2023-12-11 | End: 2023-12-11 | Stop reason: HOSPADM

## 2023-12-11 RX ORDER — HEPARIN 100 UNIT/ML
500 SYRINGE INTRAVENOUS
Status: CANCELLED | OUTPATIENT
Start: 2023-12-15

## 2023-12-11 RX ADMIN — SODIUM CHLORIDE 280 MG: 9 INJECTION, SOLUTION INTRAVENOUS at 12:12

## 2023-12-11 RX ADMIN — ATROPINE SULFATE 0.4 MG: 0.4 INJECTION, SOLUTION INTRAVENOUS at 12:12

## 2023-12-11 RX ADMIN — FLUOROURACIL 3840 MG: 50 INJECTION, SOLUTION INTRAVENOUS at 01:12

## 2023-12-11 RX ADMIN — BEVACIZUMAB-AWWB 270 MG: 100 INJECTION, SOLUTION INTRAVENOUS at 11:12

## 2023-12-11 RX ADMIN — DEXAMETHASONE SODIUM PHOSPHATE 0.25 MG: 4 INJECTION, SOLUTION INTRA-ARTICULAR; INTRALESIONAL; INTRAMUSCULAR; INTRAVENOUS; SOFT TISSUE at 11:12

## 2023-12-11 RX ADMIN — SODIUM CHLORIDE: 9 INJECTION, SOLUTION INTRAVENOUS at 09:12

## 2023-12-11 NOTE — PROGRESS NOTES
[facility]  Hematology/Medical Oncology Clinic     PATIENT: Javier Downs  MRN: [id number]  DATE: 12/11/2023    Diagnosis: Transverse colon metastatic cancer to the liver     Oncological history:   04/20/2021: metastatic colon cancer to the liver, moderately differentiated, pMMR  05/06/2021: C1 mFOLFOX + Pema  05/20/2021: C2 mFOLFOX + Pema  06/03/2021: C3 mFOLFOX + Pema   06/17/2021: C4 mFOLFOX + Pema  07/01/2021: C5 mFOLFOX + Pema  07/15/2021: C6 mFOLFOX + Pema  Restaging CT CAP: significant improvement of lesions   07/29/2021: C7 mFOLFOX + Pema   08/12/2021: Switched to maintenance  5FU+Pema (C8)  06/23/2022: C30 maintenance 5FU+Pema  10/20/2022: R hemicolectomy with Dr. Bonilla  12/30/2022: Y-90 to R liver  1/27/2023: Y-90 to R liver  5/17/2023: Y-90 to R liver  7/11/2023: C1 FOLFIRI + Pema  7/26/2023: C2 FOLFIRI + Pema  8/8/2023: C3 FOLFIRI + Pema  8/22/23: C4 FOLFIRI + Pema  Restaging CT CAP 9/1/23: Good response to chemo.  9/7/23: C5 FOLFIRI + Pema  9/19/23: C6 FOLFIRI + Pema  10/3/23: C7 FOLFIRI + Pema  10/16/23: C8 FOLFIRI + Pema  10/30/23: C9 FOLFIRI + Pema  11/13/23: C10 FOLFIRI + Pema  11/27/23: C11 FOLFIRI + Pema  12/11/23: C12 FOLFIRI + Pema    Interval History:   He presents today with his daughter prior to cycle 12 of FOLFIRI plus avastin. He continues to feel very well without new adverse effects from chemotherapy.  No significant fatigue, N/V.      ECOG status is 1. Presents with his daughter today.    Past Medical History:   Past Medical History:   Diagnosis Date    MARIA A (acute kidney injury) 8/18/2022    Hypertension     Metastatic colon cancer to liver 4/22/2021     Past Surgical HIstory:   Past Surgical History:   Procedure Laterality Date    COLONOSCOPY N/A 4/20/2021    Procedure: COLONOSCOPY with possible stent;  Surgeon: EDILMA Bonilla MD;  Location: [facility] ([address] [place] [address]);  Service: Colon and Rectal;  Laterality: N/A;    COLONOSCOPY N/A 11/3/2023    Procedure:  COLONOSCOPY;  Surgeon: EDILMA Bonilla MD;  Location: [facility] (4TH FLR);  Service: Endoscopy;  Laterality: N/A;  prep instructions sent to pt via portal  From: EDILMA Bonilla MD  Procedure: Colonoscopy  Diagnosis: Surveillance colonoscopy - Hx of colon cancer  Procedure Timin-12 weeks  Provider: Myself  Location: [facility] 4-Endo  Additional Scheduling Information: No scheduling con    DIAGNOSTIC LAPAROSCOPY N/A 2022    Procedure: LAPAROSCOPY, DIAGNOSTIC;  Surgeon: Adrian Rodriguez MD;  Location: [facility] OR 2ND FLR;  Service: General;  Laterality: N/A;    INSERTION OF TUNNELED CENTRAL VENOUS CATHETER (CVC) WITH SUBCUTANEOUS PORT N/A 5/3/2021    Procedure: [id number];  Surgeon: Francisco Layton MD;  Location: [facility] OR 2ND FLR;  Service: Vascular;  Laterality: N/A;    OMENTECTOMY N/A 10/20/2022    Procedure: OMENTECTOMY;  Surgeon: EDILMA Bonilla MD;  Location: [facility] OR 2ND FLR;  Service: Colon and Rectal;  Laterality: N/A;    RIGHT HEMICOLECTOMY N/A 10/20/2022    Procedure: HEMICOLECTOMY, RIGHT, extended;  Surgeon: EDILMA Bonilla MD;  Location: [facility] OR 2ND FLR;  Service: Colon and Rectal;  Laterality: N/A;  Extended right hemicolectomy CONSENT IN AM     Family History:   Family History   Problem Relation Age of Onset    Cancer Brother        Social History:  reports that he has never smoked. He has never used smokeless tobacco. He reports that he does not currently use alcohol. He reports that he does not use drugs.    Allergies:  Review of patient's allergies indicates:  No Known Allergies    Medications:  Current Outpatient Medications   Medication Sig Dispense Refill    acetaminophen (TYLENOL) 500 MG tablet Take 1 tablet (500 mg total) by mouth every 6 (six) hours as needed for Pain (alternate with ibuprofen). (Patient not taking: Reported on 2023)  0    amLODIPine (NORVASC) 10 MG tablet Take 1 tablet (10 mg total) by mouth once daily. 90 tablet 3    ibuprofen (ADVIL,MOTRIN) 400 MG tablet  Take 1 tablet (400 mg total) by mouth every 6 (six) hours as needed for Other (pain). Alternate with tylenol (Patient not taking: Reported on 6/28/2023)      ondansetron (ZOFRAN) 4 MG tablet Take 1 tablet (4 mg total) by mouth every 8 (eight) hours as needed for Nausea. (Patient not taking: Reported on 6/28/2023) 30 tablet 3    oxyCODONE (ROXICODONE) 5 MG immediate release tablet Take 1 tablet (5 mg total) by mouth every 6 (six) hours as needed for Pain. (Patient not taking: Reported on 6/28/2023) 20 tablet 0    tamsulosin (FLOMAX) 0.4 mg Cap Take 1 capsule (0.4 mg total) by mouth once daily. (Patient not taking: Reported on 6/28/2023) 30 capsule 5     No current facility-administered medications for this visit.     Facility-Administered Medications Ordered in Other Visits   Medication Dose Route Frequency Provider Last Rate Last Admin    alteplase injection 2 mg  2 mg Intra-Catheter PRN Bunny Schuster MD        atropine injection 0.4 mg  0.4 mg Intravenous Once PRN Bunny Schuster MD        bevacizumab-awwb (MVASI) 5 mg/kg = 270 mg in sodium chloride 0.9% 100 mL infusion  5 mg/kg (Treatment Plan Recorded) Intravenous 1 time in Clinic/HOD Bunny Schuster MD        diphenhydrAMINE injection 50 mg  50 mg Intravenous Once PRN Bunny Schuster MD        EPINEPHrine (EPIPEN) 0.3 mg/0.3 mL pen injection 0.3 mg  0.3 mg Intramuscular Once PRN Bunny Schuster MD        fluorouracil (ADRUCIL) 2,400 mg/m2 = 3,840 mg in sodium chloride 0.9% 100 mL chemo infusion  2,400 mg/m2 (Treatment Plan Recorded) Intravenous over 46 hr Bunny Schuster MD        heparin, porcine (PF) 100 unit/mL injection flush 500 Units  500 Units Intravenous PRN Bunny Schuster MD        hydrocortisone sodium succinate injection 100 mg  100 mg Intravenous Once PRN Bunny Schuster MD        irinotecan (CAMPTOSAR) 180 mg/m2 = 288 mg in sodium chloride 0.9% 514.4 mL chemo infusion  180 mg/m2 (Treatment Plan  "Recorded) Intravenous 1 time in Clinic/HOD Bunny Schuster MD        palonosetron 0.25mg/dexAMETHasone 12mg in NS IVPB 0.25 mg 50 mL  0.25 mg Intravenous 1 time in Clinic/HOD Bunny Schuster MD        prochlorperazine injection Soln 5 mg  5 mg Intravenous Once PRN Bunny Schuster MD        sodium chloride 0.9% flush 10 mL  10 mL Intravenous PRN Bunny Schuster MD         Review of Systems   Constitutional:  Positive for fatigue (improved). Negative for activity change, appetite change, chills, diaphoresis, fever and unexpected weight change.   HENT:  Negative for congestion, mouth sores, nosebleeds, postnasal drip, rhinorrhea, trouble swallowing and voice change.    Eyes:  Negative for pain and visual disturbance.   Respiratory:  Negative for cough, chest tightness, shortness of breath and wheezing.    Cardiovascular:  Negative for chest pain, palpitations and leg swelling.   Gastrointestinal:  Negative for abdominal distention, abdominal pain, blood in stool, constipation, diarrhea, nausea and vomiting.   Genitourinary:  Negative for difficulty urinating, dysuria, flank pain, frequency, hematuria and urgency.   Musculoskeletal:  Negative for arthralgias, back pain and myalgias.   Skin:  Negative for rash and wound.   Neurological:  Negative for dizziness, facial asymmetry, weakness, light-headedness, numbness and headaches.   Psychiatric/Behavioral:  Negative for agitation, behavioral problems, confusion, decreased concentration, dysphoric mood and sleep disturbance. The patient is not nervous/anxious.    All other systems reviewed and are negative.    ECOG Performance Status: 1     Objective:      Vitals:   Vitals:    12/11/23 0843   BP: (!) 141/87   Pulse: 72   Resp: 16   Temp: 97.9 °F (36.6 °C)   TempSrc: Oral   SpO2: 100%   Weight: 58 kg (127 lb 15.6 oz)   Height: 5' 5" (1.651 m)     Physical Exam  Vitals reviewed.   Constitutional:       General: He is not in acute distress.     " Appearance: Normal appearance. He is not ill-appearing, toxic-appearing or diaphoretic.   HENT:      Head: Normocephalic and atraumatic.      Right Ear: External ear normal.      Left Ear: External ear normal.      Nose: Nose normal.      Mouth/Throat:      Pharynx: Oropharynx is clear.   Eyes:      General: No scleral icterus.     Extraocular Movements: Extraocular movements intact.      Conjunctiva/sclera: Conjunctivae normal.      Pupils: Pupils are equal, round, and reactive to light.   Cardiovascular:      Rate and Rhythm: Normal rate and regular rhythm.      Pulses: Normal pulses.      Heart sounds: Normal heart sounds. No murmur heard.  Pulmonary:      Effort: Pulmonary effort is normal. No respiratory distress.      Breath sounds: Normal breath sounds. No wheezing.   Chest:      Comments: Port to RCW, no signs of infection.  Abdominal:      General: Abdomen is flat. Bowel sounds are normal. There is no distension.      Palpations: Abdomen is soft. There is no mass.      Tenderness: There is no abdominal tenderness.      Comments: Vertical midline abdominal wound well healed   Musculoskeletal:         General: No swelling or deformity. Normal range of motion.      Right lower leg: No edema.      Left lower leg: No edema.   Skin:     Coloration: Skin is not jaundiced or pale.      Findings: No bruising, erythema or rash.   Neurological:      General: No focal deficit present.      Mental Status: He is alert and oriented to person, place, and time. Mental status is at baseline.      Cranial Nerves: No cranial nerve deficit.      Sensory: No sensory deficit.      Gait: Gait normal.   Psychiatric:         Mood and Affect: Mood normal.         Behavior: Behavior normal.         Thought Content: Thought content normal.         Judgment: Judgment normal.     Laboratory Data:  Lab Visit on 12/11/2023   Component Date Value Ref Range Status    CEA 12/11/2023 4.7  0.0 - 5.0 ng/mL Final    Comment: CEA Normal  Range:  Non-Smokers: 0-3.0 ng/mL  Smokers:     0-5.0 ng/mL    The testing method is a chemiluminescent microparticle immunoassay   manufactured by [employer] and performed on the [employer]   or   [employer] system. Values obtained with different assay manufacturers   for   methods may be different and cannot be used interchangeably.      Sodium 12/11/2023 140  136 - 145 mmol/L Final    Potassium 12/11/2023 4.5  3.5 - 5.1 mmol/L Final    Chloride 12/11/2023 107  95 - 110 mmol/L Final    CO2 12/11/2023 27  23 - 29 mmol/L Final    Glucose 12/11/2023 93  70 - 110 mg/dL Final    BUN 12/11/2023 7 (L)  8 - 23 mg/dL Final    Creatinine 12/11/2023 0.9  0.5 - 1.4 mg/dL Final    Calcium 12/11/2023 9.3  8.7 - 10.5 mg/dL Final    Total Protein 12/11/2023 7.3  6.0 - 8.4 g/dL Final    Albumin 12/11/2023 3.3 (L)  3.5 - 5.2 g/dL Final    Total Bilirubin 12/11/2023 1.1 (H)  0.1 - 1.0 mg/dL Final    Comment: For infants and newborns, interpretation of results should be based  on gestational age, weight and in agreement with clinical  observations.    Premature Infant recommended reference ranges:  Up to 24 hours.............<8.0 mg/dL  Up to 48 hours............<12.0 mg/dL  3-5 days..................<15.0 mg/dL  6-29 days.................<15.0 mg/dL      Alkaline Phosphatase 12/11/2023 150 (H)  55 - 135 U/L Final    AST 12/11/2023 32  10 - 40 U/L Final    ALT 12/11/2023 22  10 - 44 U/L Final    eGFR 12/11/2023 >60.0  >60 mL/min/1.73 m^2 Final    Anion Gap 12/11/2023 6 (L)  8 - 16 mmol/L Final    WBC 12/11/2023 3.94  3.90 - 12.70 K/uL Final    RBC 12/11/2023 4.50 (L)  4.60 - 6.20 M/uL Final    Hemoglobin 12/11/2023 11.7 (L)  14.0 - 18.0 g/dL Final    Hematocrit 12/11/2023 38.8 (L)  40.0 - 54.0 % Final    MCV 12/11/2023 86  82 - 98 fL Final    MCH 12/11/2023 26.0 (L)  27.0 - 31.0 pg Final    MCHC 12/11/2023 30.2 (L)  32.0 - 36.0 g/dL Final    RDW 12/11/2023 18.1 (H)  11.5 - 14.5 % Final    Platelets 12/11/2023 193  150 - 450  K/uL Final    MPV 12/11/2023 10.9  9.2 - 12.9 fL Final    Gran # (ANC) 12/11/2023 2.3  1.8 - 7.7 K/uL Final    Comment: The ANC is based on a white cell differential from an   automated cell counter. It has not been microscopically   reviewed for the presence of abnormal cells. Clinical   correlation is required.      Immature Grans (Abs) 12/11/2023 0.01  0.00 - 0.04 K/uL Final    Comment: Mild elevation in immature granulocytes is non specific and   can be seen in a variety of conditions including stress response,   acute inflammation, trauma and pregnancy. Correlation with other   laboratory and clinical findings is essential.     Lab Visit on 12/11/2023   Component Date Value Ref Range Status    Specimen UA 12/11/2023 Urine, Clean Catch   Final    Color, UA 12/11/2023 Yellow  Yellow, Straw, [place] Final    Appearance, UA 12/11/2023 Clear  Clear Final    pH, UA 12/11/2023 6.0  5.0 - 8.0 Final    Specific Gravity, UA 12/11/2023 1.015  1.005 - 1.030 Final    Protein, UA 12/11/2023 Negative  Negative Final    Comment: Recommend a 24 hour urine protein or a urine   protein/creatinine ratio if globulin induced proteinuria is  clinically suspected.      Glucose, UA 12/11/2023 Negative  Negative Final    Ketones, UA 12/11/2023 Negative  Negative Final    Bilirubin (UA) 12/11/2023 Negative  Negative Final    Occult Blood UA 12/11/2023 Negative  Negative Final    Nitrite, UA 12/11/2023 Negative  Negative Final    Leukocytes, UA 12/11/2023 Negative  Negative Final     Assessment and Plan        1. 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 Immunodeficiency secondary to neoplasm    4. Hypertension, unspecified type    5. MARIA A (acute kidney injury)    6. Anemia associated with chemotherapy    7. Pain    8. Drug-induced constipation    9. Weight decrease    10. Severe malnutrition    11. Lower urinary tract symptoms (LUTS)      1-3.  Stage IV CRC with liver metastasis. moderately  differentiated, proficient MMR.  Guardant 360 was negative for BRAF, VIRI, HER2 amplification.   Pretreatment CEA 57.  We had a long and sonia discussion with him about his diagnosis. Unfortunately, the disease is not curable but remains treatable and he has good performance status.   Restaging scans after 7 cycles of FOLFOX + Pema showed significant reduction in colonic mass and liver mass.   we switched to maintenance as of cycle 8 with 5FU + Pema  Restaging scans from March 2022 show stable disease.   MRI on 7/18/22 showing hepatic progression of disease in the right hepatic lobe noted on the exam. CT CAP on 8/26 shows some mild progression in both liver metastases.    Discussed case at colorectal tumor board 8/31/22 and had a diagnostic lap performed by Dr. Rodriguez on 9/7 to assess for any occult peritoneal disease. Findings from the diagnostic lap were negative. Fluid from around from around the primary mass was sent for cytology that was negative for malignancy.   Underwent primary tumor resection on 10/20/22 with Dr. Bonilla.    Meanwhile his liver mets had grown.  We discussed his case at [facility] conference with plans for short term Y-90 and then restarting chemotherapy prior to considering any surgical options to his liver metastases.  He underwent Y-90 delivery on 12/30/22. Tolerated well.   CT CAP on 1/23/23 reviewed at CRC conference with good response to Y-90, no new lesions.  Underwent second Y-90 on 1/27/23. Can consider R hepatectomy pending follow-up imaging after Y-90.     Received cycle 42 of FOLFOX (omitted oxaliplatin again starting cycle 41 due to neuropathy) on 2/9/23.  Because of 5-FU shortage nationally, we have been holding chemotherapy since mid-February 2023.  If he needs to restart chemotherapy (I.e. no plans for surgical resection), will place him on capecitabine maintenance for duration of 5-FU shortage.     Discussed at colorectal liver mets tumor board 3/16/23.  Plan for repeat Y-90 and  then consideration of surgical resection and he will meet with liver transplant team as well.  Underwent Y-90 delivery 5/17/23 with IR.     Has been on maintenance Xeloda since late April 2023.    Met with liver transplant team but ultimately opted not to proceed with transplant as he was concerned about how he would tolerate a major surgery with the life changes that would come after transplant as well. They closed out his case.    He met with Dr. Rodriguez to discuss whether surgical resection is indicated for his liver mets.  He recommended repeat MRI.  Repeat MRI unfortunately showed disease progression while on Xeloda maintenance.  Similarly his CEA garrett precipitously, all in keeping with worsening disease.    We recommended we restart IV chemotherapy with FOLFIRI + Avastin (never had irinotecan).    Because of hyperbilirubinemia he received cycle 1 with just 5-FU + Avastin on 7/11/23. Tolerated this well. Bilirubin has improved.  Received irinotecan starting with cycle 2.  Repeat CT CAP after cycle 4 shows good response to therapy.   Excellent tolerance. [facility] tumor board agrees with continuation of chemotherapy.   Repeat CT CAP after cycle 7 shows stable disease, no evidence of new or worsening disease.   Repeat CT CAP after cycle 11 shows stable disease.     Doing well today.   Labs reviewed, adequate for chemo.   Will proceed with cycle 12 today.    CEA has been downtrending, continue to monitor.      -RTC in 2 weeks for next cycle with UA. Will repeat imaging studies after cycle 15.    Tempus: APC, CKS1B cng, ERCC3 cnl, PIK3CA; KENIA, TMB 12.1.    4. Hypertension   -- BP well controlled today. Feels well.   -- Refilled Amlodipine .   -- Following with PCP.   -- encouraged him and his daughter to monitor BP and HR closely at home.     5. MARIA A  -- Resolved during previous visit and Cr was normal.   -- Monitor. Encouraged continued hydration particularly with working outside.     6. Anemia  -- Hgb stable.  Monitor.  -- No signs of bleeding. Platelets normal.     7-10. Neoplasm related pain, weight loss, constipation  -- Pain very well controlled. Has oxycodone 5mg to use PRN but not requiring now.  -- Following with nutrition. Encouraged increased protein. Monitor.  -- Continue Miralax daily for constipation.    11, Urinary Hesitancy.  -- Continue Flomax.  -- Reports improvement since starting medication.     Patient is in agreement with the proposed treatment plan. All questions were answered to the patient's satisfaction. Pt knows to call clinic if anything is needed before the next clinic visit.    Bunny Schuster MD  Hematology/Oncology  [facility]      Route Chart for Scheduling    Med Onc Chart Routing      Follow up with physician 4 weeks. for FOLFIRI + Avastin   Follow up with RACHEL 2 weeks. for FOLFIRI + Avastin   Infusion scheduling note    Injection scheduling note    Labs CBC, CMP, CEA and urinalysis   Scheduling:  Preferred lab:  Lab interval: every 2 weeks     Imaging    Pharmacy appointment    Other referrals

## 2023-12-11 NOTE — NURSING
0913  Pt here for MVASI, FOLFIRI infusion/pump, accompanied by daughter, no complaints or concerns and reports tolerating treatment; discussed treatment plan for today, all questions answered and pt agrees to proceed

## 2023-12-11 NOTE — PLAN OF CARE
1330   Infusion completed, pt tolerated; CADD pump connected, infusing w/out issue; pt instructed to increase water hydration; discussed when to contact MD, when to report to ED; pt and daughter verbalized understanding of all discussed and to report for pump d/c on Wed 12/13/23 at 1130

## 2023-12-13 ENCOUNTER — INFUSION (OUTPATIENT)
Dept: INFUSION THERAPY | Facility: HOSPITAL | Age: 73
End: 2023-12-13
Payer: MEDICARE

## 2023-12-13 VITALS
SYSTOLIC BLOOD PRESSURE: 107 MMHG | DIASTOLIC BLOOD PRESSURE: 63 MMHG | TEMPERATURE: 98 F | HEART RATE: 77 BPM | RESPIRATION RATE: 16 BRPM

## 2023-12-13 DIAGNOSIS — C78.7 METASTATIC COLON CANCER TO LIVER: Primary | ICD-10-CM

## 2023-12-13 DIAGNOSIS — C18.9 METASTATIC COLON CANCER TO LIVER: Primary | ICD-10-CM

## 2023-12-13 PROCEDURE — 25000003 PHARM REV CODE 250: Performed by: INTERNAL MEDICINE

## 2023-12-13 PROCEDURE — A4216 STERILE WATER/SALINE, 10 ML: HCPCS | Performed by: INTERNAL MEDICINE

## 2023-12-13 PROCEDURE — 63600175 PHARM REV CODE 636 W HCPCS: Performed by: INTERNAL MEDICINE

## 2023-12-13 RX ORDER — SODIUM CHLORIDE 0.9 % (FLUSH) 0.9 %
10 SYRINGE (ML) INJECTION
Status: DISCONTINUED | OUTPATIENT
Start: 2023-12-13 | End: 2023-12-13 | Stop reason: HOSPADM

## 2023-12-13 RX ORDER — HEPARIN 100 UNIT/ML
500 SYRINGE INTRAVENOUS
Status: DISCONTINUED | OUTPATIENT
Start: 2023-12-13 | End: 2023-12-13 | Stop reason: HOSPADM

## 2023-12-13 RX ADMIN — Medication 10 ML: at 11:12

## 2023-12-13 RX ADMIN — HEPARIN 500 UNITS: 100 SYRINGE at 11:12

## 2023-12-13 NOTE — NURSING
Pt here for pump d/c. Assessment complete and VSS. Flushed PAC with NS and heparin prior to de-accessing. No questions or concerns. Pt ambulated out of unit unassisted.

## 2023-12-26 ENCOUNTER — OFFICE VISIT (OUTPATIENT)
Dept: HEMATOLOGY/ONCOLOGY | Facility: CLINIC | Age: 73
End: 2023-12-26
Payer: MEDICARE

## 2023-12-26 ENCOUNTER — INFUSION (OUTPATIENT)
Dept: INFUSION THERAPY | Facility: HOSPITAL | Age: 73
End: 2023-12-26
Payer: MEDICARE

## 2023-12-26 VITALS
TEMPERATURE: 98 F | HEIGHT: 65 IN | SYSTOLIC BLOOD PRESSURE: 114 MMHG | HEART RATE: 69 BPM | WEIGHT: 129.63 LBS | RESPIRATION RATE: 20 BRPM | DIASTOLIC BLOOD PRESSURE: 64 MMHG | BODY MASS INDEX: 21.6 KG/M2

## 2023-12-26 VITALS
WEIGHT: 129.63 LBS | HEIGHT: 65 IN | SYSTOLIC BLOOD PRESSURE: 110 MMHG | DIASTOLIC BLOOD PRESSURE: 66 MMHG | TEMPERATURE: 98 F | OXYGEN SATURATION: 100 % | HEART RATE: 69 BPM | BODY MASS INDEX: 21.6 KG/M2 | RESPIRATION RATE: 20 BRPM

## 2023-12-26 DIAGNOSIS — D49.9 IMMUNODEFICIENCY SECONDARY TO NEOPLASM: ICD-10-CM

## 2023-12-26 DIAGNOSIS — C78.7 METASTATIC COLON CANCER TO LIVER: Primary | ICD-10-CM

## 2023-12-26 DIAGNOSIS — Z79.899 IMMUNODEFICIENCY DUE TO CHEMOTHERAPY: ICD-10-CM

## 2023-12-26 DIAGNOSIS — T45.1X5A IMMUNODEFICIENCY DUE TO CHEMOTHERAPY: ICD-10-CM

## 2023-12-26 DIAGNOSIS — D64.81 ANEMIA ASSOCIATED WITH CHEMOTHERAPY: ICD-10-CM

## 2023-12-26 DIAGNOSIS — D84.81 IMMUNODEFICIENCY SECONDARY TO NEOPLASM: ICD-10-CM

## 2023-12-26 DIAGNOSIS — E43 SEVERE MALNUTRITION: ICD-10-CM

## 2023-12-26 DIAGNOSIS — T45.1X5A ANEMIA ASSOCIATED WITH CHEMOTHERAPY: ICD-10-CM

## 2023-12-26 DIAGNOSIS — C18.9 METASTATIC COLON CANCER TO LIVER: Primary | ICD-10-CM

## 2023-12-26 DIAGNOSIS — K59.03 DRUG-INDUCED CONSTIPATION: ICD-10-CM

## 2023-12-26 DIAGNOSIS — R52 PAIN: ICD-10-CM

## 2023-12-26 DIAGNOSIS — R63.4 WEIGHT DECREASE: ICD-10-CM

## 2023-12-26 DIAGNOSIS — R39.9 LOWER URINARY TRACT SYMPTOMS (LUTS): ICD-10-CM

## 2023-12-26 DIAGNOSIS — D84.821 IMMUNODEFICIENCY DUE TO CHEMOTHERAPY: ICD-10-CM

## 2023-12-26 DIAGNOSIS — N17.9 AKI (ACUTE KIDNEY INJURY): ICD-10-CM

## 2023-12-26 DIAGNOSIS — I10 HYPERTENSION, UNSPECIFIED TYPE: ICD-10-CM

## 2023-12-26 PROCEDURE — 63600175 PHARM REV CODE 636 W HCPCS: Mod: JZ,JG | Performed by: REGISTERED NURSE

## 2023-12-26 PROCEDURE — 1159F MED LIST DOCD IN RCRD: CPT | Mod: CPTII,S$GLB,, | Performed by: REGISTERED NURSE

## 2023-12-26 PROCEDURE — 1126F AMNT PAIN NOTED NONE PRSNT: CPT | Mod: CPTII,S$GLB,, | Performed by: REGISTERED NURSE

## 2023-12-26 PROCEDURE — 96413 CHEMO IV INFUSION 1 HR: CPT

## 2023-12-26 PROCEDURE — 96375 TX/PRO/DX INJ NEW DRUG ADDON: CPT

## 2023-12-26 PROCEDURE — 96417 CHEMO IV INFUS EACH ADDL SEQ: CPT

## 2023-12-26 PROCEDURE — 99999 PR PBB SHADOW E&M-EST. PATIENT-LVL III: CPT | Mod: PBBFAC,,, | Performed by: REGISTERED NURSE

## 2023-12-26 PROCEDURE — 99999 PR PBB SHADOW E&M-EST. PATIENT-LVL III: ICD-10-PCS | Mod: PBBFAC,,, | Performed by: REGISTERED NURSE

## 2023-12-26 PROCEDURE — 3008F PR BODY MASS INDEX (BMI) DOCUMENTED: ICD-10-PCS | Mod: CPTII,S$GLB,, | Performed by: REGISTERED NURSE

## 2023-12-26 PROCEDURE — 3078F DIAST BP <80 MM HG: CPT | Mod: CPTII,S$GLB,, | Performed by: REGISTERED NURSE

## 2023-12-26 PROCEDURE — 99215 PR OFFICE/OUTPT VISIT, EST, LEVL V, 40-54 MIN: ICD-10-PCS | Mod: S$GLB,,, | Performed by: REGISTERED NURSE

## 2023-12-26 PROCEDURE — 1126F PR PAIN SEVERITY QUANTIFIED, NO PAIN PRESENT: ICD-10-PCS | Mod: CPTII,S$GLB,, | Performed by: REGISTERED NURSE

## 2023-12-26 PROCEDURE — 1159F PR MEDICATION LIST DOCUMENTED IN MEDICAL RECORD: ICD-10-PCS | Mod: CPTII,S$GLB,, | Performed by: REGISTERED NURSE

## 2023-12-26 PROCEDURE — 3008F BODY MASS INDEX DOCD: CPT | Mod: CPTII,S$GLB,, | Performed by: REGISTERED NURSE

## 2023-12-26 PROCEDURE — 3074F SYST BP LT 130 MM HG: CPT | Mod: CPTII,S$GLB,, | Performed by: REGISTERED NURSE

## 2023-12-26 PROCEDURE — 3078F PR MOST RECENT DIASTOLIC BLOOD PRESSURE < 80 MM HG: ICD-10-PCS | Mod: CPTII,S$GLB,, | Performed by: REGISTERED NURSE

## 2023-12-26 PROCEDURE — 99215 OFFICE O/P EST HI 40 MIN: CPT | Mod: S$GLB,,, | Performed by: REGISTERED NURSE

## 2023-12-26 PROCEDURE — 1160F PR REVIEW ALL MEDS BY PRESCRIBER/CLIN PHARMACIST DOCUMENTED: ICD-10-PCS | Mod: CPTII,S$GLB,, | Performed by: REGISTERED NURSE

## 2023-12-26 PROCEDURE — 3288F FALL RISK ASSESSMENT DOCD: CPT | Mod: CPTII,S$GLB,, | Performed by: REGISTERED NURSE

## 2023-12-26 PROCEDURE — 3074F PR MOST RECENT SYSTOLIC BLOOD PRESSURE < 130 MM HG: ICD-10-PCS | Mod: CPTII,S$GLB,, | Performed by: REGISTERED NURSE

## 2023-12-26 PROCEDURE — 3288F PR FALLS RISK ASSESSMENT DOCUMENTED: ICD-10-PCS | Mod: CPTII,S$GLB,, | Performed by: REGISTERED NURSE

## 2023-12-26 PROCEDURE — 1101F PT FALLS ASSESS-DOCD LE1/YR: CPT | Mod: CPTII,S$GLB,, | Performed by: REGISTERED NURSE

## 2023-12-26 PROCEDURE — 25000003 PHARM REV CODE 250: Performed by: REGISTERED NURSE

## 2023-12-26 PROCEDURE — 1160F RVW MEDS BY RX/DR IN RCRD: CPT | Mod: CPTII,S$GLB,, | Performed by: REGISTERED NURSE

## 2023-12-26 PROCEDURE — 1101F PR PT FALLS ASSESS DOC 0-1 FALLS W/OUT INJ PAST YR: ICD-10-PCS | Mod: CPTII,S$GLB,, | Performed by: REGISTERED NURSE

## 2023-12-26 PROCEDURE — 96416 CHEMO PROLONG INFUSE W/PUMP: CPT

## 2023-12-26 PROCEDURE — 96367 TX/PROPH/DG ADDL SEQ IV INF: CPT

## 2023-12-26 RX ORDER — ATROPINE SULFATE 0.4 MG/ML
0.4 INJECTION, SOLUTION ENDOTRACHEAL; INTRAMEDULLARY; INTRAMUSCULAR; INTRAVENOUS; SUBCUTANEOUS ONCE AS NEEDED
Status: CANCELLED | OUTPATIENT
Start: 2023-12-27

## 2023-12-26 RX ORDER — ATROPINE SULFATE 0.4 MG/ML
0.4 INJECTION, SOLUTION ENDOTRACHEAL; INTRAMEDULLARY; INTRAMUSCULAR; INTRAVENOUS; SUBCUTANEOUS ONCE AS NEEDED
Status: COMPLETED | OUTPATIENT
Start: 2023-12-26 | End: 2023-12-26

## 2023-12-26 RX ORDER — SODIUM CHLORIDE 0.9 % (FLUSH) 0.9 %
10 SYRINGE (ML) INJECTION
Status: DISCONTINUED | OUTPATIENT
Start: 2023-12-26 | End: 2023-12-26 | Stop reason: HOSPADM

## 2023-12-26 RX ORDER — EPINEPHRINE 0.3 MG/.3ML
0.3 INJECTION SUBCUTANEOUS ONCE AS NEEDED
Status: CANCELLED | OUTPATIENT
Start: 2023-12-27

## 2023-12-26 RX ORDER — HEPARIN 100 UNIT/ML
500 SYRINGE INTRAVENOUS
Status: CANCELLED | OUTPATIENT
Start: 2023-12-27

## 2023-12-26 RX ORDER — DIPHENHYDRAMINE HYDROCHLORIDE 50 MG/ML
50 INJECTION INTRAMUSCULAR; INTRAVENOUS ONCE AS NEEDED
Status: CANCELLED | OUTPATIENT
Start: 2023-12-27

## 2023-12-26 RX ORDER — EPINEPHRINE 0.3 MG/.3ML
0.3 INJECTION SUBCUTANEOUS ONCE AS NEEDED
Status: DISCONTINUED | OUTPATIENT
Start: 2023-12-26 | End: 2023-12-26 | Stop reason: HOSPADM

## 2023-12-26 RX ORDER — PROCHLORPERAZINE EDISYLATE 5 MG/ML
5 INJECTION INTRAMUSCULAR; INTRAVENOUS ONCE AS NEEDED
Status: CANCELLED
Start: 2023-12-27

## 2023-12-26 RX ORDER — HEPARIN 100 UNIT/ML
500 SYRINGE INTRAVENOUS
Status: DISCONTINUED | OUTPATIENT
Start: 2023-12-26 | End: 2023-12-26 | Stop reason: HOSPADM

## 2023-12-26 RX ORDER — HEPARIN 100 UNIT/ML
500 SYRINGE INTRAVENOUS
Status: CANCELLED | OUTPATIENT
Start: 2023-12-29

## 2023-12-26 RX ORDER — PROCHLORPERAZINE EDISYLATE 5 MG/ML
5 INJECTION INTRAMUSCULAR; INTRAVENOUS ONCE AS NEEDED
Status: DISCONTINUED | OUTPATIENT
Start: 2023-12-26 | End: 2023-12-26 | Stop reason: HOSPADM

## 2023-12-26 RX ORDER — SODIUM CHLORIDE 0.9 % (FLUSH) 0.9 %
10 SYRINGE (ML) INJECTION
Status: CANCELLED | OUTPATIENT
Start: 2023-12-29

## 2023-12-26 RX ORDER — SODIUM CHLORIDE 0.9 % (FLUSH) 0.9 %
10 SYRINGE (ML) INJECTION
Status: CANCELLED | OUTPATIENT
Start: 2023-12-27

## 2023-12-26 RX ORDER — DIPHENHYDRAMINE HYDROCHLORIDE 50 MG/ML
50 INJECTION INTRAMUSCULAR; INTRAVENOUS ONCE AS NEEDED
Status: DISCONTINUED | OUTPATIENT
Start: 2023-12-26 | End: 2023-12-26 | Stop reason: HOSPADM

## 2023-12-26 RX ADMIN — FLUOROURACIL 3840 MG: 50 INJECTION, SOLUTION INTRAVENOUS at 04:12

## 2023-12-26 RX ADMIN — DEXAMETHASONE SODIUM PHOSPHATE 0.25 MG: 4 INJECTION, SOLUTION INTRA-ARTICULAR; INTRALESIONAL; INTRAMUSCULAR; INTRAVENOUS; SOFT TISSUE at 03:12

## 2023-12-26 RX ADMIN — BEVACIZUMAB-AWWB 270 MG: 100 INJECTION, SOLUTION INTRAVENOUS at 02:12

## 2023-12-26 RX ADMIN — ATROPINE SULFATE 0.4 MG: 0.4 INJECTION, SOLUTION INTRAVENOUS at 03:12

## 2023-12-26 RX ADMIN — IRINOTECAN HYDROCHLORIDE 280 MG: 20 INJECTION, SOLUTION INTRAVENOUS at 03:12

## 2023-12-26 NOTE — PROGRESS NOTES
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 Bonilla  12/30/2022: Y-90 to R liver  1/27/2023: Y-90 to R liver  5/17/2023: Y-90 to R liver  7/11/2023: C1 FOLFIRI + Pema  7/26/2023: C2 FOLFIRI + Pema  8/8/2023: C3 FOLFIRI + Pema  8/22/23: C4 FOLFIRI + Pema  Restaging CT CAP 9/1/23: Good response to chemo.  9/7/23: C5 FOLFIRI + Pema  9/19/23: C6 FOLFIRI + Pema  10/3/23: C7 FOLFIRI + Pema  10/16/23: C8 FOLFIRI + Pema  10/30/23: C9 FOLFIRI + Pema  11/13/23: C10 FOLFIRI + Pema  11/27/23: C11 FOLFIRI + Pema  12/11/23: C12 FOLFIRI + Pema  12/26/23: C13 FOLFIRI + Pema    Interval History:   He presents today with his daughter prior to cycle 13 of FOLFIRI plus avastin. Doing well overall and had a wonderful Alli with family. Eating well, weight up 2 lbs. Staying active. Denies fever/chills, SOB, CP, palpitations, N/V, C/D, pain, blood in urine/stool, paresthesias.     ECOG status is 1. Presents with his daughter today.    Past Medical History:   Past Medical History:   Diagnosis Date    MARIA A (acute kidney injury) 8/18/2022    Hypertension     Metastatic colon cancer to liver 4/22/2021     Past Surgical HIstory:   Past Surgical History:   Procedure Laterality Date    COLONOSCOPY N/A 4/20/2021    Procedure: COLONOSCOPY with possible stent;  Surgeon: EDILMA Bonilla MD;   Location: [facility] ENDO (2ND FLR);  Service: Colon and Rectal;  Laterality: N/A;    COLONOSCOPY N/A 11/3/2023    Procedure: COLONOSCOPY;  Surgeon: EDILMA Bonilla MD;  Location: [facility] ENDO (4TH FLR);  Service: Endoscopy;  Laterality: N/A;  prep instructions sent to pt via portal  From: EDILMA Bonilla MD  Procedure: Colonoscopy  Diagnosis: Surveillance colonoscopy - Hx of colon cancer  Procedure Timin-12 weeks  Provider: Myself  Location: [facility] 4-Endo  Additional Scheduling Information: No scheduling con    DIAGNOSTIC LAPAROSCOPY N/A 2022    Procedure: LAPAROSCOPY, DIAGNOSTIC;  Surgeon: Adrian Rodriguez MD;  Location: [facility] OR 2ND FLR;  Service: General;  Laterality: N/A;    INSERTION OF TUNNELED CENTRAL VENOUS CATHETER (CVC) WITH SUBCUTANEOUS PORT N/A 5/3/2021    Procedure: [id number];  Surgeon: Francisco Layton MD;  Location: [facility] OR 2ND FLR;  Service: Vascular;  Laterality: N/A;    OMENTECTOMY N/A 10/20/2022    Procedure: OMENTECTOMY;  Surgeon: EDILMA Bonilla MD;  Location: [facility] OR 2ND FLR;  Service: Colon and Rectal;  Laterality: N/A;    RIGHT HEMICOLECTOMY N/A 10/20/2022    Procedure: HEMICOLECTOMY, RIGHT, extended;  Surgeon: EDILMA Bonilla MD;  Location: [facility] OR 2ND FLR;  Service: Colon and Rectal;  Laterality: N/A;  Extended right hemicolectomy CONSENT IN AM     Family History:   Family History   Problem Relation Age of Onset    Cancer Brother        Social History:  reports that he has never smoked. He has never used smokeless tobacco. He reports that he does not currently use alcohol. He reports that he does not use drugs.    Allergies:  Review of patient's allergies indicates:  No Known Allergies    Medications:  Current Outpatient Medications   Medication Sig Dispense Refill    amLODIPine (NORVASC) 10 MG tablet Take 1 tablet (10 mg total) by mouth once daily. 90 tablet 3    acetaminophen (TYLENOL) 500 MG tablet Take 1 tablet (500 mg total) by mouth every 6 (six) hours as needed for Pain  (alternate with ibuprofen). (Patient not taking: Reported on 12/26/2023)  0    ibuprofen (ADVIL,MOTRIN) 400 MG tablet Take 1 tablet (400 mg total) by mouth every 6 (six) hours as needed for Other (pain). Alternate with tylenol (Patient not taking: Reported on 12/26/2023)      ondansetron (ZOFRAN) 4 MG tablet Take 1 tablet (4 mg total) by mouth every 8 (eight) hours as needed for Nausea. (Patient not taking: Reported on 12/26/2023) 30 tablet 3    oxyCODONE (ROXICODONE) 5 MG immediate release tablet Take 1 tablet (5 mg total) by mouth every 6 (six) hours as needed for Pain. (Patient not taking: Reported on 12/26/2023) 20 tablet 0    tamsulosin (FLOMAX) 0.4 mg Cap Take 1 capsule (0.4 mg total) by mouth once daily. (Patient not taking: Reported on 6/28/2023) 30 capsule 5     No current facility-administered medications for this visit.     Review of Systems   Constitutional:  Positive for fatigue (improved). Negative for activity change, appetite change, chills, diaphoresis, fever and unexpected weight change.   HENT:  Negative for congestion, mouth sores, nosebleeds, postnasal drip, rhinorrhea, trouble swallowing and voice change.    Eyes:  Negative for pain and visual disturbance.   Respiratory:  Negative for cough, chest tightness, shortness of breath and wheezing.    Cardiovascular:  Negative for chest pain, palpitations and leg swelling.   Gastrointestinal:  Negative for abdominal distention, abdominal pain, blood in stool, constipation, diarrhea, nausea and vomiting.   Genitourinary:  Negative for difficulty urinating, dysuria, flank pain, frequency, hematuria and urgency.   Musculoskeletal:  Negative for arthralgias, back pain and myalgias.   Skin:  Negative for rash and wound.   Neurological:  Negative for dizziness, facial asymmetry, weakness, light-headedness, numbness and headaches.   Psychiatric/Behavioral:  Negative for agitation, behavioral problems, confusion, decreased concentration, dysphoric mood and  "sleep disturbance. The patient is not nervous/anxious.    All other systems reviewed and are negative.    ECOG Performance Status: 1     Objective:      Vitals:   Vitals:    12/26/23 1341   BP: 110/66   Pulse: 69   Resp: 20   Temp: 97.6 °F (36.4 °C)   TempSrc: Oral   SpO2: 100%   Weight: 58.8 kg (129 lb 10.1 oz)   Height: 5' 5" (1.651 m)       Physical Exam  Vitals reviewed.   Constitutional:       General: He is not in acute distress.     Appearance: Normal appearance. He is not ill-appearing, toxic-appearing or diaphoretic.   HENT:      Head: Normocephalic and atraumatic.      Right Ear: External ear normal.      Left Ear: External ear normal.      Nose: Nose normal.      Mouth/Throat:      Pharynx: Oropharynx is clear.   Eyes:      General: No scleral icterus.     Extraocular Movements: Extraocular movements intact.      Conjunctiva/sclera: Conjunctivae normal.      Pupils: Pupils are equal, round, and reactive to light.   Cardiovascular:      Rate and Rhythm: Normal rate and regular rhythm.      Pulses: Normal pulses.      Heart sounds: Normal heart sounds. No murmur heard.  Pulmonary:      Effort: Pulmonary effort is normal. No respiratory distress.      Breath sounds: Normal breath sounds. No wheezing.   Chest:      Comments: Port to RCW, no signs of infection.  Abdominal:      General: Abdomen is flat. Bowel sounds are normal. There is no distension.      Palpations: Abdomen is soft. There is no mass.      Tenderness: There is no abdominal tenderness.      Comments: Vertical midline abdominal wound well healed   Musculoskeletal:         General: No swelling or deformity. Normal range of motion.      Right lower leg: No edema.      Left lower leg: No edema.   Skin:     Coloration: Skin is not jaundiced or pale.      Findings: No bruising, erythema or rash.   Neurological:      General: No focal deficit present.      Mental Status: He is alert and oriented to person, place, and time. Mental status is at " baseline.      Cranial Nerves: No cranial nerve deficit.      Sensory: No sensory deficit.      Gait: Gait normal.   Psychiatric:         Mood and Affect: Mood normal.         Behavior: Behavior normal.         Thought Content: Thought content normal.         Judgment: Judgment normal.     Laboratory Data:  Lab Visit on 12/26/2023   Component Date Value Ref Range Status    WBC 12/26/2023 4.85  3.90 - 12.70 K/uL Final    RBC 12/26/2023 4.41 (L)  4.60 - 6.20 M/uL Final    Hemoglobin 12/26/2023 11.5 (L)  14.0 - 18.0 g/dL Final    Hematocrit 12/26/2023 38.2 (L)  40.0 - 54.0 % Final    MCV 12/26/2023 87  82 - 98 fL Final    MCH 12/26/2023 26.1 (L)  27.0 - 31.0 pg Final    MCHC 12/26/2023 30.1 (L)  32.0 - 36.0 g/dL Final    RDW 12/26/2023 18.3 (H)  11.5 - 14.5 % Final    Platelets 12/26/2023 195  150 - 450 K/uL Final    MPV 12/26/2023 11.3  9.2 - 12.9 fL Final    Immature Granulocytes 12/26/2023 0.2  0.0 - 0.5 % Final    Gran # (ANC) 12/26/2023 2.8  1.8 - 7.7 K/uL Final    Immature Grans (Abs) 12/26/2023 0.01  0.00 - 0.04 K/uL Final    Comment: Mild elevation in immature granulocytes is non specific and   can be seen in a variety of conditions including stress response,   acute inflammation, trauma and pregnancy. Correlation with other   laboratory and clinical findings is essential.      Lymph # 12/26/2023 1.1  1.0 - 4.8 K/uL Final    Mono # 12/26/2023 0.8  0.3 - 1.0 K/uL Final    Eos # 12/26/2023 0.1  0.0 - 0.5 K/uL Final    Baso # 12/26/2023 0.03  0.00 - 0.20 K/uL Final    nRBC 12/26/2023 0  0 /100 WBC Final    Gran % 12/26/2023 57.6  38.0 - 73.0 % Final    Lymph % 12/26/2023 23.1  18.0 - 48.0 % Final    Mono % 12/26/2023 16.9 (H)  4.0 - 15.0 % Final    Eosinophil % 12/26/2023 1.6  0.0 - 8.0 % Final    Basophil % 12/26/2023 0.6  0.0 - 1.9 % Final    Differential Method 12/26/2023 Automated   Final    Sodium 12/26/2023 139  136 - 145 mmol/L Final    Potassium 12/26/2023 4.1  3.5 - 5.1 mmol/L Final    Chloride 12/26/2023  108  95 - 110 mmol/L Final    CO2 12/26/2023 25  23 - 29 mmol/L Final    Glucose 12/26/2023 112 (H)  70 - 110 mg/dL Final    BUN 12/26/2023 10  8 - 23 mg/dL Final    Creatinine 12/26/2023 0.9  0.5 - 1.4 mg/dL Final    Calcium 12/26/2023 9.1  8.7 - 10.5 mg/dL Final    Total Protein 12/26/2023 7.1  6.0 - 8.4 g/dL Final    Albumin 12/26/2023 3.3 (L)  3.5 - 5.2 g/dL Final    Total Bilirubin 12/26/2023 1.2 (H)  0.1 - 1.0 mg/dL Final    Comment: For infants and newborns, interpretation of results should be based  on gestational age, weight and in agreement with clinical  observations.    Premature Infant recommended reference ranges:  Up to 24 hours.............<8.0 mg/dL  Up to 48 hours............<12.0 mg/dL  3-5 days..................<15.0 mg/dL  6-29 days.................<15.0 mg/dL      Alkaline Phosphatase 12/26/2023 149 (H)  55 - 135 U/L Final    AST 12/26/2023 26  10 - 40 U/L Final    ALT 12/26/2023 18  10 - 44 U/L Final    eGFR 12/26/2023 >60.0  >60 mL/min/1.73 m^2 Final    Anion Gap 12/26/2023 6 (L)  8 - 16 mmol/L Final    CEA 12/26/2023 4.0  0.0 - 5.0 ng/mL Final    Comment: CEA Normal Range:  Non-Smokers: 0-3.0 ng/mL  Smokers:     0-5.0 ng/mL    The testing method is a chemiluminescent microparticle immunoassay   manufactured by [employer] and performed on the [employer]   or   BiOM system. Values obtained with different assay manufacturers   for   methods may be different and cannot be used interchangeably.       Assessment and Plan        1. Metastatic colon cancer to liver    2. Immunodeficiency secondary to neoplasm    3. Immunodeficiency due to chemotherapy    4. Hypertension, unspecified type    5. MARIA A (acute kidney injury)    6. Anemia associated with chemotherapy    7. Pain    8. Drug-induced constipation    9. Weight decrease    10. Severe malnutrition    11. Lower urinary tract symptoms (LUTS)        1-3.  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   Guardant 360 was negative for BRAF, VIRI, HER2 amplification.   Pretreatment CEA 57.  We had a long and sonia discussion with him about his diagnosis. Unfortunately, the disease is not curable but remains treatable and he has good performance status.   Restaging scans after 7 cycles of FOLFOX + Pema showed significant reduction in colonic mass and liver mass.   we switched to maintenance as of cycle 8 with 5FU + Pema  Restaging scans from March 2022 show stable disease.   MRI on 7/18/22 showing hepatic progression of disease in the right hepatic lobe noted on the exam. CT CAP on 8/26 shows some mild progression in both liver metastases.    Discussed case at colorectal tumor board 8/31/22 and had a diagnostic lap performed by Dr. Rodriguez on 9/7 to assess for any occult peritoneal disease. Findings from the diagnostic lap were negative. Fluid from around from around the primary mass was sent for cytology that was negative for malignancy.   Underwent primary tumor resection on 10/20/22 with Dr. Bonilla.    Meanwhile his liver mets had grown.  We discussed his case at CRC conference with plans for short term Y-90 and then restarting chemotherapy prior to considering any surgical options to his liver metastases.  He underwent Y-90 delivery on 12/30/22. Tolerated well.   CT CAP on 1/23/23 reviewed at CRC conference with good response to Y-90, no new lesions.  Underwent second Y-90 on 1/27/23. Can consider R hepatectomy pending follow-up imaging after Y-90.     Received cycle 42 of FOLFOX (omitted oxaliplatin again starting cycle 41 due to neuropathy) on 2/9/23.  Because of 5-FU shortage nationally, we have been holding chemotherapy since mid-February 2023.  If he needs to restart chemotherapy (I.e. no plans for surgical resection), will place him on capecitabine maintenance for duration of 5-FU shortage.     Discussed at colorectal liver mets tumor board 3/16/23.  Plan for repeat Y-90 and then consideration of surgical  resection and he will meet with liver transplant team as well.  Underwent Y-90 delivery 5/17/23 with IR.     Has been on maintenance Xeloda since late April 2023.    Met with liver transplant team but ultimately opted not to proceed with transplant as he was concerned about how he would tolerate a major surgery with the life changes that would come after transplant as well. They closed out his case.    He met with Dr. Rodriguez to discuss whether surgical resection is indicated for his liver mets.  He recommended repeat MRI.  Repeat MRI unfortunately showed disease progression while on Xeloda maintenance.  Similarly his CEA garrett precipitously, all in keeping with worsening disease.    We recommended we restart IV chemotherapy with FOLFIRI + Avastin (never had irinotecan).    Because of hyperbilirubinemia he received cycle 1 with just 5-FU + Avastin on 7/11/23. Tolerated this well. Bilirubin has improved.  Received irinotecan starting with cycle 2.  Repeat CT CAP after cycle 4 shows good response to therapy.   Excellent tolerance. mCRC tumor board agrees with continuation of chemotherapy.   Repeat CT CAP after cycle 7 shows stable disease, no evidence of new or worsening disease.   Repeat CT CAP after cycle 11 shows stable disease.     Doing well today.   Labs reviewed, adequate for chemo.   Will proceed with cycle 13 today.    CEA has been downtrending, continue to monitor.      -RTC in 2 weeks for next cycle with UA. Will repeat imaging studies after cycle 15.    Tempus: APC, CKS1B cng, ERCC3 cnl, PIK3CA; KENIA, TMB 12.1.    4. Hypertension   -- BP well controlled today. Feels well.   -- On Amlodipine.   -- Following with PCP.   -- encouraged him and his daughter to monitor BP and HR closely at home.     5. MARIA A  -- Resolved during previous visit and Cr was normal.   -- Monitor. Encouraged continued hydration particularly with working outside.     6. Anemia  -- Hgb stable. Monitor.  -- No signs of bleeding. Platelets  normal.     7-10. Neoplasm related pain, weight loss, constipation  -- Pain very well controlled. Has oxycodone 5mg to use PRN but not requiring now.  -- Following with nutrition. Encouraged increased protein. Monitor.  -- Continue Miralax daily for constipation.    11, Urinary Hesitancy.  -- Continue Flomax.  -- Reports improvement since starting medication.     Patient is in agreement with the proposed treatment plan. All questions were answered to the patient's satisfaction. Pt knows to call clinic if anything is needed before the next clinic visit.    Patient discussed with collaborating physician, Dr. Schuster.    At least 40 minutes were spent today on this encounter including face to face time with the patient, data gathering/interpretation and documentation.       Natividad Maher, MSN, APRN, [facility]-  Hematology and Medical Oncology  Clinical Nurse Specialist to Dr. Schuster, Dr. Quijano & Dr. Mueller    Route Chart for Scheduling    Med Onc Chart Routing      Follow up with physician 6 weeks. with scans and labs done 1-2 days prior to see Dr. Schuster for scan results/ongoing treatment planning then get chemo   Follow up with RACHEL    Infusion scheduling note   chemo every 2 weeks, pump d/c on day 3   Injection scheduling note    Labs CBC, CMP, CEA and urinalysis   Scheduling:  Preferred lab:  Lab interval: every 2 weeks     Imaging CT chest abdomen pelvis   prior to visit in 6 weeks with MD   Pharmacy appointment    Other referrals

## 2023-12-26 NOTE — PLAN OF CARE
Problem: Adult Inpatient Plan of Care  Goal: Plan of Care Review  Outcome: Ongoing, Progressing   Patient tolerated MVASI/FOLFIRI infusion today. CADD pump infusion 5FU at 2.2 ml/hr for 46 hrs. Verified by 2 Rns. RTC 12/28/23 @ 3 pm for pump dc. NAD noted. VSS. Discharged home.

## 2023-12-28 ENCOUNTER — INFUSION (OUTPATIENT)
Dept: INFUSION THERAPY | Facility: HOSPITAL | Age: 73
End: 2023-12-28
Payer: MEDICARE

## 2023-12-28 VITALS
RESPIRATION RATE: 18 BRPM | HEART RATE: 70 BPM | OXYGEN SATURATION: 100 % | SYSTOLIC BLOOD PRESSURE: 118 MMHG | TEMPERATURE: 98 F | DIASTOLIC BLOOD PRESSURE: 75 MMHG

## 2023-12-28 DIAGNOSIS — C18.9 METASTATIC COLON CANCER TO LIVER: Primary | ICD-10-CM

## 2023-12-28 DIAGNOSIS — C78.7 METASTATIC COLON CANCER TO LIVER: Primary | ICD-10-CM

## 2023-12-28 PROCEDURE — A4216 STERILE WATER/SALINE, 10 ML: HCPCS | Performed by: REGISTERED NURSE

## 2023-12-28 PROCEDURE — 63600175 PHARM REV CODE 636 W HCPCS: Performed by: REGISTERED NURSE

## 2023-12-28 PROCEDURE — 25000003 PHARM REV CODE 250: Performed by: REGISTERED NURSE

## 2023-12-28 RX ORDER — HEPARIN 100 UNIT/ML
500 SYRINGE INTRAVENOUS
Status: DISCONTINUED | OUTPATIENT
Start: 2023-12-28 | End: 2023-12-28 | Stop reason: HOSPADM

## 2023-12-28 RX ORDER — SODIUM CHLORIDE 0.9 % (FLUSH) 0.9 %
10 SYRINGE (ML) INJECTION
Status: DISCONTINUED | OUTPATIENT
Start: 2023-12-28 | End: 2023-12-28 | Stop reason: HOSPADM

## 2023-12-28 RX ADMIN — HEPARIN 500 UNITS: 100 SYRINGE at 03:12

## 2023-12-28 RX ADMIN — SODIUM CHLORIDE, PRESERVATIVE FREE 10 ML: 5 INJECTION INTRAVENOUS at 03:12

## 2024-01-09 ENCOUNTER — INFUSION (OUTPATIENT)
Dept: INFUSION THERAPY | Facility: HOSPITAL | Age: 74
End: 2024-01-09
Payer: MEDICARE

## 2024-01-09 ENCOUNTER — OFFICE VISIT (OUTPATIENT)
Dept: HEMATOLOGY/ONCOLOGY | Facility: CLINIC | Age: 74
End: 2024-01-09
Payer: MEDICARE

## 2024-01-09 VITALS
TEMPERATURE: 98 F | HEART RATE: 77 BPM | BODY MASS INDEX: 21.14 KG/M2 | HEIGHT: 65 IN | OXYGEN SATURATION: 100 % | DIASTOLIC BLOOD PRESSURE: 66 MMHG | WEIGHT: 126.88 LBS | SYSTOLIC BLOOD PRESSURE: 123 MMHG | RESPIRATION RATE: 18 BRPM

## 2024-01-09 VITALS
SYSTOLIC BLOOD PRESSURE: 112 MMHG | DIASTOLIC BLOOD PRESSURE: 64 MMHG | TEMPERATURE: 98 F | OXYGEN SATURATION: 98 % | RESPIRATION RATE: 18 BRPM | HEART RATE: 78 BPM

## 2024-01-09 DIAGNOSIS — I10 HYPERTENSION, UNSPECIFIED TYPE: ICD-10-CM

## 2024-01-09 DIAGNOSIS — D49.9 IMMUNODEFICIENCY SECONDARY TO NEOPLASM: ICD-10-CM

## 2024-01-09 DIAGNOSIS — Z79.899 IMMUNODEFICIENCY DUE TO CHEMOTHERAPY: ICD-10-CM

## 2024-01-09 DIAGNOSIS — D84.81 IMMUNODEFICIENCY SECONDARY TO NEOPLASM: ICD-10-CM

## 2024-01-09 DIAGNOSIS — T45.1X5A IMMUNODEFICIENCY DUE TO CHEMOTHERAPY: ICD-10-CM

## 2024-01-09 DIAGNOSIS — C78.7 METASTATIC COLON CANCER TO LIVER: Primary | ICD-10-CM

## 2024-01-09 DIAGNOSIS — D84.821 IMMUNODEFICIENCY DUE TO CHEMOTHERAPY: ICD-10-CM

## 2024-01-09 DIAGNOSIS — R39.9 LOWER URINARY TRACT SYMPTOMS (LUTS): ICD-10-CM

## 2024-01-09 DIAGNOSIS — K59.03 DRUG-INDUCED CONSTIPATION: ICD-10-CM

## 2024-01-09 DIAGNOSIS — N17.9 AKI (ACUTE KIDNEY INJURY): ICD-10-CM

## 2024-01-09 DIAGNOSIS — C18.9 METASTATIC COLON CANCER TO LIVER: Primary | ICD-10-CM

## 2024-01-09 DIAGNOSIS — T45.1X5A ANEMIA ASSOCIATED WITH CHEMOTHERAPY: ICD-10-CM

## 2024-01-09 DIAGNOSIS — E43 SEVERE MALNUTRITION: ICD-10-CM

## 2024-01-09 DIAGNOSIS — D64.81 ANEMIA ASSOCIATED WITH CHEMOTHERAPY: ICD-10-CM

## 2024-01-09 DIAGNOSIS — R52 PAIN: ICD-10-CM

## 2024-01-09 PROCEDURE — 1159F MED LIST DOCD IN RCRD: CPT | Mod: CPTII,S$GLB,, | Performed by: PHYSICIAN ASSISTANT

## 2024-01-09 PROCEDURE — 1101F PT FALLS ASSESS-DOCD LE1/YR: CPT | Mod: CPTII,S$GLB,, | Performed by: PHYSICIAN ASSISTANT

## 2024-01-09 PROCEDURE — 3288F FALL RISK ASSESSMENT DOCD: CPT | Mod: CPTII,S$GLB,, | Performed by: PHYSICIAN ASSISTANT

## 2024-01-09 PROCEDURE — 96417 CHEMO IV INFUS EACH ADDL SEQ: CPT

## 2024-01-09 PROCEDURE — 96416 CHEMO PROLONG INFUSE W/PUMP: CPT

## 2024-01-09 PROCEDURE — 25000003 PHARM REV CODE 250: Performed by: PHYSICIAN ASSISTANT

## 2024-01-09 PROCEDURE — 96367 TX/PROPH/DG ADDL SEQ IV INF: CPT

## 2024-01-09 PROCEDURE — 96413 CHEMO IV INFUSION 1 HR: CPT

## 2024-01-09 PROCEDURE — 1160F RVW MEDS BY RX/DR IN RCRD: CPT | Mod: CPTII,S$GLB,, | Performed by: PHYSICIAN ASSISTANT

## 2024-01-09 PROCEDURE — 63600175 PHARM REV CODE 636 W HCPCS: Performed by: PHYSICIAN ASSISTANT

## 2024-01-09 PROCEDURE — 1126F AMNT PAIN NOTED NONE PRSNT: CPT | Mod: CPTII,S$GLB,, | Performed by: PHYSICIAN ASSISTANT

## 2024-01-09 PROCEDURE — 3074F SYST BP LT 130 MM HG: CPT | Mod: CPTII,S$GLB,, | Performed by: PHYSICIAN ASSISTANT

## 2024-01-09 PROCEDURE — 99999 PR PBB SHADOW E&M-EST. PATIENT-LVL III: CPT | Mod: PBBFAC,,, | Performed by: PHYSICIAN ASSISTANT

## 2024-01-09 PROCEDURE — 99215 OFFICE O/P EST HI 40 MIN: CPT | Mod: S$GLB,,, | Performed by: PHYSICIAN ASSISTANT

## 2024-01-09 PROCEDURE — 3008F BODY MASS INDEX DOCD: CPT | Mod: CPTII,S$GLB,, | Performed by: PHYSICIAN ASSISTANT

## 2024-01-09 PROCEDURE — 96415 CHEMO IV INFUSION ADDL HR: CPT

## 2024-01-09 PROCEDURE — 3078F DIAST BP <80 MM HG: CPT | Mod: CPTII,S$GLB,, | Performed by: PHYSICIAN ASSISTANT

## 2024-01-09 RX ORDER — EPINEPHRINE 0.3 MG/.3ML
0.3 INJECTION SUBCUTANEOUS ONCE AS NEEDED
Status: CANCELLED | OUTPATIENT
Start: 2024-01-10

## 2024-01-09 RX ORDER — DIPHENHYDRAMINE HYDROCHLORIDE 50 MG/ML
50 INJECTION, SOLUTION INTRAMUSCULAR; INTRAVENOUS ONCE AS NEEDED
Status: CANCELLED | OUTPATIENT
Start: 2024-01-10

## 2024-01-09 RX ORDER — DIPHENHYDRAMINE HYDROCHLORIDE 50 MG/ML
50 INJECTION, SOLUTION INTRAMUSCULAR; INTRAVENOUS ONCE AS NEEDED
Status: DISCONTINUED | OUTPATIENT
Start: 2024-01-09 | End: 2024-01-09 | Stop reason: HOSPADM

## 2024-01-09 RX ORDER — PROCHLORPERAZINE EDISYLATE 5 MG/ML
5 INJECTION INTRAMUSCULAR; INTRAVENOUS ONCE AS NEEDED
Status: CANCELLED
Start: 2024-01-10

## 2024-01-09 RX ORDER — SODIUM CHLORIDE 0.9 % (FLUSH) 0.9 %
10 SYRINGE (ML) INJECTION
Status: DISCONTINUED | OUTPATIENT
Start: 2024-01-09 | End: 2024-01-09 | Stop reason: HOSPADM

## 2024-01-09 RX ORDER — ATROPINE SULFATE 0.4 MG/ML
0.4 INJECTION, SOLUTION ENDOTRACHEAL; INTRAMEDULLARY; INTRAMUSCULAR; INTRAVENOUS; SUBCUTANEOUS ONCE AS NEEDED
Status: CANCELLED | OUTPATIENT
Start: 2024-01-10

## 2024-01-09 RX ORDER — ATROPINE SULFATE 0.4 MG/ML
0.4 INJECTION, SOLUTION ENDOTRACHEAL; INTRAMEDULLARY; INTRAMUSCULAR; INTRAVENOUS; SUBCUTANEOUS ONCE AS NEEDED
Status: COMPLETED | OUTPATIENT
Start: 2024-01-09 | End: 2024-01-09

## 2024-01-09 RX ORDER — PROCHLORPERAZINE EDISYLATE 5 MG/ML
5 INJECTION INTRAMUSCULAR; INTRAVENOUS ONCE AS NEEDED
Status: DISCONTINUED | OUTPATIENT
Start: 2024-01-09 | End: 2024-01-09 | Stop reason: HOSPADM

## 2024-01-09 RX ORDER — HEPARIN 100 UNIT/ML
500 SYRINGE INTRAVENOUS
Status: CANCELLED | OUTPATIENT
Start: 2024-01-12

## 2024-01-09 RX ORDER — HEPARIN 100 UNIT/ML
500 SYRINGE INTRAVENOUS
Status: DISCONTINUED | OUTPATIENT
Start: 2024-01-09 | End: 2024-01-09 | Stop reason: HOSPADM

## 2024-01-09 RX ORDER — SODIUM CHLORIDE 0.9 % (FLUSH) 0.9 %
10 SYRINGE (ML) INJECTION
Status: CANCELLED | OUTPATIENT
Start: 2024-01-12

## 2024-01-09 RX ORDER — SODIUM CHLORIDE 0.9 % (FLUSH) 0.9 %
10 SYRINGE (ML) INJECTION
Status: CANCELLED | OUTPATIENT
Start: 2024-01-10

## 2024-01-09 RX ORDER — HEPARIN 100 UNIT/ML
500 SYRINGE INTRAVENOUS
Status: CANCELLED | OUTPATIENT
Start: 2024-01-10

## 2024-01-09 RX ORDER — EPINEPHRINE 0.3 MG/.3ML
0.3 INJECTION SUBCUTANEOUS ONCE AS NEEDED
Status: DISCONTINUED | OUTPATIENT
Start: 2024-01-09 | End: 2024-01-09 | Stop reason: HOSPADM

## 2024-01-09 RX ADMIN — IRINOTECAN HYDROCHLORIDE 280 MG: 20 INJECTION, SOLUTION INTRAVENOUS at 01:01

## 2024-01-09 RX ADMIN — ATROPINE SULFATE 0.4 MG: 0.4 INJECTION, SOLUTION INTRAVENOUS at 01:01

## 2024-01-09 RX ADMIN — SODIUM CHLORIDE: 9 INJECTION, SOLUTION INTRAVENOUS at 11:01

## 2024-01-09 RX ADMIN — FLUOROURACIL 3840 MG: 50 INJECTION, SOLUTION INTRAVENOUS at 03:01

## 2024-01-09 RX ADMIN — BEVACIZUMAB-AWWB 270 MG: 100 INJECTION, SOLUTION INTRAVENOUS at 12:01

## 2024-01-09 RX ADMIN — DEXAMETHASONE SODIUM PHOSPHATE 0.25 MG: 4 INJECTION, SOLUTION INTRA-ARTICULAR; INTRALESIONAL; INTRAMUSCULAR; INTRAVENOUS; SOFT TISSUE at 12:01

## 2024-01-09 NOTE — PLAN OF CARE
1125 Patient here for C14 folfiri and mvasi. Labs, meds and hx reviewed. Patient was seen by BUD Hernandez this morning and orders signed for treatment today. Port accessed and NS started at 25ml/hr. Bradley and snack offered.

## 2024-01-09 NOTE — PROGRESS NOTES
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 Bonilla  12/30/2022: Y-90 to R liver  1/27/2023: Y-90 to R liver  5/17/2023: Y-90 to R liver  7/11/2023: C1 FOLFIRI + Pema  7/26/2023: C2 FOLFIRI + Pema  8/8/2023: C3 FOLFIRI + Pema  8/22/23: C4 FOLFIRI + Pema  Restaging CT CAP 9/1/23: Good response to chemo.  9/7/23: C5 FOLFIRI + Pema  9/19/23: C6 FOLFIRI + Pema  10/3/23: C7 FOLFIRI + Pema  10/16/23: C8 FOLFIRI + Pema  10/30/23: C9 FOLFIRI + Pema  11/13/23: C10 FOLFIRI + Pema  11/27/23: C11 FOLFIRI + Pema  12/11/23: C12 FOLFIRI + Pema    Interval History:   He presents today with his daughter prior to cycle 12 of FOLFIRI plus avastin. He continues to feel very well without new adverse effects from chemotherapy.  No significant fatigue, N/V.  He is eating well and good appetite. No pain. No other concerns or complaints today. Doing well .    ECOG status is 1. Presents with his daughter today.    Past Medical History:   Past Medical History:   Diagnosis Date    MARIA A (acute kidney injury) 8/18/2022    Hypertension     Metastatic colon cancer to liver 4/22/2021     Past Surgical HIstory:   Past Surgical History:   Procedure Laterality Date    COLONOSCOPY N/A 4/20/2021    Procedure: COLONOSCOPY with possible stent;  Surgeon: EDILMA Bonilla MD;  Location: [facility] (2ND FLR);   Service: Colon and Rectal;  Laterality: N/A;    COLONOSCOPY N/A 11/3/2023    Procedure: COLONOSCOPY;  Surgeon: EDILMA Bonilla MD;  Location: [facility] (4TH FLR);  Service: Endoscopy;  Laterality: N/A;  prep instructions sent to pt via portal  From: EDILMA Bonilla MD  Procedure: Colonoscopy  Diagnosis: Surveillance colonoscopy - Hx of colon cancer  Procedure Timin-12 weeks  Provider: Myself  Location: [facility]  Additional Scheduling Information: No scheduling con    DIAGNOSTIC LAPAROSCOPY N/A 2022    Procedure: LAPAROSCOPY, DIAGNOSTIC;  Surgeon: Adrian Rodriguez MD;  Location: [facility] OR 2ND FLR;  Service: General;  Laterality: N/A;    INSERTION OF TUNNELED CENTRAL VENOUS CATHETER (CVC) WITH SUBCUTANEOUS PORT N/A 5/3/2021    Procedure: [id number]-HMAN-U-CATH;  Surgeon: Francisco Layton MD;  Location: [facility] OR 2ND FLR;  Service: Vascular;  Laterality: N/A;    OMENTECTOMY N/A 10/20/2022    Procedure: OMENTECTOMY;  Surgeon: EDILMA Bonilla MD;  Location: [facility] OR 2ND FLR;  Service: Colon and Rectal;  Laterality: N/A;    RIGHT HEMICOLECTOMY N/A 10/20/2022    Procedure: HEMICOLECTOMY, RIGHT, extended;  Surgeon: EDILMA Bonilla MD;  Location: [facility] OR 2ND FLR;  Service: Colon and Rectal;  Laterality: N/A;  Extended right hemicolectomy CONSENT IN AM     Family History:   Family History   Problem Relation Age of Onset    Cancer Brother        Social History:  reports that he has never smoked. He has never used smokeless tobacco. He reports that he does not currently use alcohol. He reports that he does not use drugs.    Allergies:  Review of patient's allergies indicates:  No Known Allergies    Medications:  Current Outpatient Medications   Medication Sig Dispense Refill    acetaminophen (TYLENOL) 500 MG tablet Take 1 tablet (500 mg total) by mouth every 6 (six) hours as needed for Pain (alternate with ibuprofen). (Patient not taking: Reported on 2023)  0    amLODIPine (NORVASC) 10 MG tablet Take 1 tablet  (10 mg total) by mouth once daily. 90 tablet 3    ibuprofen (ADVIL,MOTRIN) 400 MG tablet Take 1 tablet (400 mg total) by mouth every 6 (six) hours as needed for Other (pain). Alternate with tylenol (Patient not taking: Reported on 12/26/2023)      ondansetron (ZOFRAN) 4 MG tablet Take 1 tablet (4 mg total) by mouth every 8 (eight) hours as needed for Nausea. (Patient not taking: Reported on 12/26/2023) 30 tablet 3    oxyCODONE (ROXICODONE) 5 MG immediate release tablet Take 1 tablet (5 mg total) by mouth every 6 (six) hours as needed for Pain. (Patient not taking: Reported on 12/26/2023) 20 tablet 0    tamsulosin (FLOMAX) 0.4 mg Cap Take 1 capsule (0.4 mg total) by mouth once daily. (Patient not taking: Reported on 6/28/2023) 30 capsule 5     No current facility-administered medications for this visit.     Review of Systems   Constitutional:  Positive for fatigue (improved). Negative for activity change, appetite change, chills, diaphoresis, fever and unexpected weight change.   HENT:  Negative for congestion, mouth sores, nosebleeds, postnasal drip, rhinorrhea, trouble swallowing and voice change.    Eyes:  Negative for pain and visual disturbance.   Respiratory:  Negative for cough, chest tightness, shortness of breath and wheezing.    Cardiovascular:  Negative for chest pain, palpitations and leg swelling.   Gastrointestinal:  Negative for abdominal distention, abdominal pain, blood in stool, constipation, diarrhea, nausea and vomiting.   Genitourinary:  Negative for difficulty urinating, dysuria, flank pain, frequency, hematuria and urgency.   Musculoskeletal:  Negative for arthralgias, back pain and myalgias.   Skin:  Negative for rash and wound.   Neurological:  Negative for dizziness, facial asymmetry, weakness, light-headedness, numbness and headaches.   Psychiatric/Behavioral:  Negative for agitation, behavioral problems, confusion, decreased concentration, dysphoric mood and sleep disturbance. The patient  "is not nervous/anxious.    All other systems reviewed and are negative.    ECOG Performance Status: 1     Objective:      Vitals:   Vitals:    01/09/24 1102   BP: 123/66   BP Location: Left arm   Patient Position: Sitting   BP Method: Medium (Automatic)   Pulse: 77   Resp: 18   Temp: 97.6 °F (36.4 °C)   TempSrc: Oral   SpO2: 100%   Weight: 57.6 kg (126 lb 14 oz)   Height: 5' 5" (1.651 m)       Physical Exam  Vitals reviewed.   Constitutional:       General: He is not in acute distress.     Appearance: Normal appearance. He is not ill-appearing, toxic-appearing or diaphoretic.   HENT:      Head: Normocephalic and atraumatic.      Right Ear: External ear normal.      Left Ear: External ear normal.      Nose: Nose normal.      Mouth/Throat:      Pharynx: Oropharynx is clear.   Eyes:      General: No scleral icterus.     Extraocular Movements: Extraocular movements intact.      Conjunctiva/sclera: Conjunctivae normal.      Pupils: Pupils are equal, round, and reactive to light.   Cardiovascular:      Rate and Rhythm: Normal rate and regular rhythm.      Pulses: Normal pulses.      Heart sounds: Normal heart sounds. No murmur heard.  Pulmonary:      Effort: Pulmonary effort is normal. No respiratory distress.      Breath sounds: Normal breath sounds. No wheezing.   Chest:      Comments: Port to RCW, no signs of infection.  Abdominal:      General: Abdomen is flat. Bowel sounds are normal. There is no distension.      Palpations: Abdomen is soft. There is no mass.      Tenderness: There is no abdominal tenderness.      Comments: Vertical midline abdominal wound well healed   Musculoskeletal:         General: No swelling or deformity. Normal range of motion.      Right lower leg: No edema.      Left lower leg: No edema.   Skin:     Coloration: Skin is not jaundiced or pale.      Findings: No bruising, erythema or rash.   Neurological:      General: No focal deficit present.      Mental Status: He is alert and oriented to " person, place, and time. Mental status is at baseline.      Cranial Nerves: No cranial nerve deficit.      Sensory: No sensory deficit.      Gait: Gait normal.   Psychiatric:         Mood and Affect: Mood normal.         Behavior: Behavior normal.         Thought Content: Thought content normal.         Judgment: Judgment normal.     Laboratory Data:  Lab Visit on 01/09/2024   Component Date Value Ref Range Status    WBC 01/09/2024 5.04  3.90 - 12.70 K/uL Final    RBC 01/09/2024 4.42 (L)  4.60 - 6.20 M/uL Final    Hemoglobin 01/09/2024 11.3 (L)  14.0 - 18.0 g/dL Final    Hematocrit 01/09/2024 38.8 (L)  40.0 - 54.0 % Final    MCV 01/09/2024 88  82 - 98 fL Final    MCH 01/09/2024 25.6 (L)  27.0 - 31.0 pg Final    MCHC 01/09/2024 29.1 (L)  32.0 - 36.0 g/dL Final    RDW 01/09/2024 17.7 (H)  11.5 - 14.5 % Final    Platelets 01/09/2024 217  150 - 450 K/uL Final    MPV 01/09/2024 11.5  9.2 - 12.9 fL Final    Gran # (ANC) 01/09/2024 3.3  1.8 - 7.7 K/uL Final    Comment: The ANC is based on a white cell differential from an   automated cell counter. It has not been microscopically   reviewed for the presence of abnormal cells. Clinical   correlation is required.      Immature Grans (Abs) 01/09/2024 0.01  0.00 - 0.04 K/uL Final    Comment: Mild elevation in immature granulocytes is non specific and   can be seen in a variety of conditions including stress response,   acute inflammation, trauma and pregnancy. Correlation with other   laboratory and clinical findings is essential.       Assessment and Plan        1. Metastatic colon cancer to liver    2. Immunodeficiency secondary to neoplasm    3. Immunodeficiency due to chemotherapy    4. Hypertension, unspecified type    5. MARIA A (acute kidney injury)    6. Anemia associated with chemotherapy    7. Pain    8. Drug-induced constipation    9. Severe malnutrition    10. Lower urinary tract symptoms (LUTS)        1-3.  Stage IV CRC with liver metastasis. moderately differentiated,  proficient MMR.  Guardant 360 was negative for BRAF, VIRI, HER2 amplification.   Pretreatment CEA 57.  We had a long and sonia discussion with him about his diagnosis. Unfortunately, the disease is not curable but remains treatable and he has good performance status.   Restaging scans after 7 cycles of FOLFOX + Pema showed significant reduction in colonic mass and liver mass.   we switched to maintenance as of cycle 8 with 5FU + Pema  Restaging scans from March 2022 show stable disease.   MRI on 7/18/22 showing hepatic progression of disease in the right hepatic lobe noted on the exam. CT CAP on 8/26 shows some mild progression in both liver metastases.    Discussed case at colorectal tumor board 8/31/22 and had a diagnostic lap performed by Dr. Rodriguez on 9/7 to assess for any occult peritoneal disease. Findings from the diagnostic lap were negative. Fluid from around from around the primary mass was sent for cytology that was negative for malignancy.   Underwent primary tumor resection on 10/20/22 with Dr. Bonilla.    Meanwhile his liver mets had grown.  We discussed his case at CRC conference with plans for short term Y-90 and then restarting chemotherapy prior to considering any surgical options to his liver metastases.  He underwent Y-90 delivery on 12/30/22. Tolerated well.   CT CAP on 1/23/23 reviewed at CRC conference with good response to Y-90, no new lesions.  Underwent second Y-90 on 1/27/23. Can consider R hepatectomy pending follow-up imaging after Y-90.     Received cycle 42 of FOLFOX (omitted oxaliplatin again starting cycle 41 due to neuropathy) on 2/9/23.  Because of 5-FU shortage nationally, we have been holding chemotherapy since mid-February 2023.  If he needs to restart chemotherapy (I.e. no plans for surgical resection), will place him on capecitabine maintenance for duration of 5-FU shortage.     Discussed at colorectal liver mets tumor board 3/16/23.  Plan for repeat Y-90 and then  consideration of surgical resection and he will meet with liver transplant team as well.  Underwent Y-90 delivery 5/17/23 with IR.     Has been on maintenance Xeloda since late April 2023.    Met with liver transplant team but ultimately opted not to proceed with transplant as he was concerned about how he would tolerate a major surgery with the life changes that would come after transplant as well. They closed out his case.    He met with Dr. Rodriguez to discuss whether surgical resection is indicated for his liver mets.  He recommended repeat MRI.  Repeat MRI unfortunately showed disease progression while on Xeloda maintenance.  Similarly his CEA garrett precipitously, all in keeping with worsening disease.    We recommended we restart IV chemotherapy with FOLFIRI + Avastin (never had irinotecan).    Because of hyperbilirubinemia he received cycle 1 with just 5-FU + Avastin on 7/11/23. Tolerated this well. Bilirubin has improved.  Received irinotecan starting with cycle 2.  Repeat CT CAP after cycle 4 shows good response to therapy.   Excellent tolerance. mCRC tumor board agrees with continuation of chemotherapy.   Repeat CT CAP after cycle 7 shows stable disease, no evidence of new or worsening disease.   Repeat CT CAP after cycle 11 shows stable disease.     Doing well today.   Labs reviewed, adequate for chemo.   Will proceed with cycle 14 today.    CEA has been downtrending, continue to monitor.      -RTC in 2 weeks for next cycle with UA. Will repeat imaging studies after cycle 15.    Tempus: APC, CKS1B cng, ERCC3 cnl, PIK3CA; KENIA, TMB 12.1.    4. Hypertension   -- BP well controlled today. Feels well.   -- Refilled Amlodipine .   -- Following with PCP.   -- encouraged him and his daughter to monitor BP and HR closely at home.     5. MARIA A  -- Resolved during previous visit and Cr was normal. CMP pending from today.   -- Monitor. Encouraged continued hydration particularly with working outside.     6. Anemia  -- Hgb  stable. Monitor.  -- No signs of bleeding. Platelets normal.     7-10. Neoplasm related pain, weight loss, constipation  -- Pain very well controlled. Has oxycodone 5mg to use PRN but not requiring now.  -- Following with nutrition. Encouraged increased protein. Monitor.  -- Continue Miralax daily for constipation.    11, Urinary Hesitancy.  -- Continue Flomax.  -- Reports improvement since starting medication.     Patient is in agreement with the proposed treatment plan. All questions were answered to the patient's satisfaction. Pt knows to call clinic if anything is needed before the next clinic visit.          FAN Meier, PA-C  Physician Assistant Certified  Dept of Hematology/Oncology  PA-C to Dr. Muellre, Dr. Schuster and Dr. Castellon       Route Chart for Scheduling  Med Onc Route Chart for Scheduling

## 2024-01-09 NOTE — PLAN OF CARE
1505 Patient tolerated foliri and mvasi with no s/s of reaction and no complaints. 5FU pump applied, secured and running without difficulty. Instructed patient to return on Thursday at 1pm for pump d/c. Instructed to call MD office for any concerns. He ambulated out with family member.

## 2024-01-11 ENCOUNTER — INFUSION (OUTPATIENT)
Dept: INFUSION THERAPY | Facility: HOSPITAL | Age: 74
End: 2024-01-11
Payer: MEDICARE

## 2024-01-11 VITALS
DIASTOLIC BLOOD PRESSURE: 65 MMHG | TEMPERATURE: 98 F | HEART RATE: 85 BPM | RESPIRATION RATE: 17 BRPM | SYSTOLIC BLOOD PRESSURE: 110 MMHG

## 2024-01-11 DIAGNOSIS — C78.7 METASTATIC COLON CANCER TO LIVER: Primary | ICD-10-CM

## 2024-01-11 DIAGNOSIS — C18.9 METASTATIC COLON CANCER TO LIVER: Primary | ICD-10-CM

## 2024-01-11 PROCEDURE — A4216 STERILE WATER/SALINE, 10 ML: HCPCS | Performed by: PHYSICIAN ASSISTANT

## 2024-01-11 PROCEDURE — 25000003 PHARM REV CODE 250: Performed by: PHYSICIAN ASSISTANT

## 2024-01-11 PROCEDURE — 63600175 PHARM REV CODE 636 W HCPCS: Performed by: PHYSICIAN ASSISTANT

## 2024-01-11 RX ORDER — HEPARIN 100 UNIT/ML
500 SYRINGE INTRAVENOUS
Status: DISCONTINUED | OUTPATIENT
Start: 2024-01-11 | End: 2024-01-11 | Stop reason: HOSPADM

## 2024-01-11 RX ORDER — SODIUM CHLORIDE 0.9 % (FLUSH) 0.9 %
10 SYRINGE (ML) INJECTION
Status: DISCONTINUED | OUTPATIENT
Start: 2024-01-11 | End: 2024-01-11 | Stop reason: HOSPADM

## 2024-01-11 RX ADMIN — HEPARIN 500 UNITS: 100 SYRINGE at 10:01

## 2024-01-11 RX ADMIN — Medication 10 ML: at 10:01

## 2024-01-24 ENCOUNTER — TELEPHONE (OUTPATIENT)
Dept: HEMATOLOGY/ONCOLOGY | Facility: CLINIC | Age: 74
End: 2024-01-24
Payer: MEDICARE

## 2024-01-24 NOTE — TELEPHONE ENCOUNTER
Left pt. a voicemail in regards to confirming upcoming appt. on Thursday, January 25th with Bunny Schuster MD. MA instructed pt. in voicemail to call the office number to confirm.     BLD, MA Ext. [phone]

## 2024-01-25 ENCOUNTER — INFUSION (OUTPATIENT)
Dept: INFUSION THERAPY | Facility: HOSPITAL | Age: 74
End: 2024-01-25
Payer: MEDICARE

## 2024-01-25 ENCOUNTER — OFFICE VISIT (OUTPATIENT)
Dept: HEMATOLOGY/ONCOLOGY | Facility: CLINIC | Age: 74
End: 2024-01-25
Payer: MEDICARE

## 2024-01-25 VITALS
OXYGEN SATURATION: 100 % | HEIGHT: 65 IN | HEART RATE: 81 BPM | WEIGHT: 135.06 LBS | DIASTOLIC BLOOD PRESSURE: 65 MMHG | RESPIRATION RATE: 18 BRPM | SYSTOLIC BLOOD PRESSURE: 106 MMHG | BODY MASS INDEX: 22.5 KG/M2

## 2024-01-25 VITALS
WEIGHT: 135.06 LBS | BODY MASS INDEX: 22.5 KG/M2 | HEART RATE: 73 BPM | SYSTOLIC BLOOD PRESSURE: 126 MMHG | HEIGHT: 65 IN | DIASTOLIC BLOOD PRESSURE: 66 MMHG

## 2024-01-25 DIAGNOSIS — T45.1X5A ANEMIA ASSOCIATED WITH CHEMOTHERAPY: ICD-10-CM

## 2024-01-25 DIAGNOSIS — D84.81 IMMUNODEFICIENCY SECONDARY TO NEOPLASM: ICD-10-CM

## 2024-01-25 DIAGNOSIS — R52 PAIN: ICD-10-CM

## 2024-01-25 DIAGNOSIS — D84.821 IMMUNODEFICIENCY DUE TO CHEMOTHERAPY: ICD-10-CM

## 2024-01-25 DIAGNOSIS — Z79.899 IMMUNODEFICIENCY DUE TO CHEMOTHERAPY: ICD-10-CM

## 2024-01-25 DIAGNOSIS — N17.9 AKI (ACUTE KIDNEY INJURY): ICD-10-CM

## 2024-01-25 DIAGNOSIS — T45.1X5A IMMUNODEFICIENCY DUE TO CHEMOTHERAPY: ICD-10-CM

## 2024-01-25 DIAGNOSIS — D64.81 ANEMIA ASSOCIATED WITH CHEMOTHERAPY: ICD-10-CM

## 2024-01-25 DIAGNOSIS — K59.03 DRUG-INDUCED CONSTIPATION: ICD-10-CM

## 2024-01-25 DIAGNOSIS — C18.9 METASTATIC COLON CANCER TO LIVER: Primary | ICD-10-CM

## 2024-01-25 DIAGNOSIS — R39.9 LOWER URINARY TRACT SYMPTOMS (LUTS): ICD-10-CM

## 2024-01-25 DIAGNOSIS — E43 SEVERE MALNUTRITION: ICD-10-CM

## 2024-01-25 DIAGNOSIS — I10 HYPERTENSION, UNSPECIFIED TYPE: ICD-10-CM

## 2024-01-25 DIAGNOSIS — C78.7 METASTATIC COLON CANCER TO LIVER: Primary | ICD-10-CM

## 2024-01-25 DIAGNOSIS — D49.9 IMMUNODEFICIENCY SECONDARY TO NEOPLASM: ICD-10-CM

## 2024-01-25 PROCEDURE — 1101F PT FALLS ASSESS-DOCD LE1/YR: CPT | Mod: CPTII,S$GLB,, | Performed by: INTERNAL MEDICINE

## 2024-01-25 PROCEDURE — 99215 OFFICE O/P EST HI 40 MIN: CPT | Mod: S$GLB,,, | Performed by: INTERNAL MEDICINE

## 2024-01-25 PROCEDURE — 96416 CHEMO PROLONG INFUSE W/PUMP: CPT

## 2024-01-25 PROCEDURE — 96417 CHEMO IV INFUS EACH ADDL SEQ: CPT

## 2024-01-25 PROCEDURE — 3074F SYST BP LT 130 MM HG: CPT | Mod: CPTII,S$GLB,, | Performed by: INTERNAL MEDICINE

## 2024-01-25 PROCEDURE — 3008F BODY MASS INDEX DOCD: CPT | Mod: CPTII,S$GLB,, | Performed by: INTERNAL MEDICINE

## 2024-01-25 PROCEDURE — 3078F DIAST BP <80 MM HG: CPT | Mod: CPTII,S$GLB,, | Performed by: INTERNAL MEDICINE

## 2024-01-25 PROCEDURE — 1159F MED LIST DOCD IN RCRD: CPT | Mod: CPTII,S$GLB,, | Performed by: INTERNAL MEDICINE

## 2024-01-25 PROCEDURE — 96367 TX/PROPH/DG ADDL SEQ IV INF: CPT

## 2024-01-25 PROCEDURE — 96415 CHEMO IV INFUSION ADDL HR: CPT

## 2024-01-25 PROCEDURE — 3288F FALL RISK ASSESSMENT DOCD: CPT | Mod: CPTII,S$GLB,, | Performed by: INTERNAL MEDICINE

## 2024-01-25 PROCEDURE — 1126F AMNT PAIN NOTED NONE PRSNT: CPT | Mod: CPTII,S$GLB,, | Performed by: INTERNAL MEDICINE

## 2024-01-25 PROCEDURE — 25000003 PHARM REV CODE 250: Performed by: INTERNAL MEDICINE

## 2024-01-25 PROCEDURE — 1160F RVW MEDS BY RX/DR IN RCRD: CPT | Mod: CPTII,S$GLB,, | Performed by: INTERNAL MEDICINE

## 2024-01-25 PROCEDURE — 63600175 PHARM REV CODE 636 W HCPCS: Performed by: INTERNAL MEDICINE

## 2024-01-25 PROCEDURE — 96413 CHEMO IV INFUSION 1 HR: CPT

## 2024-01-25 PROCEDURE — 96375 TX/PRO/DX INJ NEW DRUG ADDON: CPT

## 2024-01-25 PROCEDURE — 99999 PR PBB SHADOW E&M-EST. PATIENT-LVL III: CPT | Mod: PBBFAC,,, | Performed by: INTERNAL MEDICINE

## 2024-01-25 RX ORDER — EPINEPHRINE 0.3 MG/.3ML
0.3 INJECTION SUBCUTANEOUS ONCE AS NEEDED
Status: CANCELLED | OUTPATIENT
Start: 2024-01-25

## 2024-01-25 RX ORDER — DIPHENHYDRAMINE HYDROCHLORIDE 50 MG/ML
50 INJECTION INTRAMUSCULAR; INTRAVENOUS ONCE AS NEEDED
Status: CANCELLED | OUTPATIENT
Start: 2024-01-25

## 2024-01-25 RX ORDER — PROCHLORPERAZINE EDISYLATE 5 MG/ML
5 INJECTION INTRAMUSCULAR; INTRAVENOUS ONCE AS NEEDED
Status: DISCONTINUED | OUTPATIENT
Start: 2024-01-25 | End: 2024-01-25 | Stop reason: HOSPADM

## 2024-01-25 RX ORDER — DIPHENHYDRAMINE HYDROCHLORIDE 50 MG/ML
50 INJECTION INTRAMUSCULAR; INTRAVENOUS ONCE AS NEEDED
Status: DISCONTINUED | OUTPATIENT
Start: 2024-01-25 | End: 2024-01-25 | Stop reason: HOSPADM

## 2024-01-25 RX ORDER — PROCHLORPERAZINE EDISYLATE 5 MG/ML
5 INJECTION INTRAMUSCULAR; INTRAVENOUS ONCE AS NEEDED
Status: CANCELLED
Start: 2024-01-25

## 2024-01-25 RX ORDER — HEPARIN 100 UNIT/ML
500 SYRINGE INTRAVENOUS
Status: CANCELLED | OUTPATIENT
Start: 2024-01-25

## 2024-01-25 RX ORDER — HEPARIN 100 UNIT/ML
500 SYRINGE INTRAVENOUS
Status: CANCELLED | OUTPATIENT
Start: 2024-01-26

## 2024-01-25 RX ORDER — SODIUM CHLORIDE 0.9 % (FLUSH) 0.9 %
10 SYRINGE (ML) INJECTION
Status: DISCONTINUED | OUTPATIENT
Start: 2024-01-25 | End: 2024-01-25 | Stop reason: HOSPADM

## 2024-01-25 RX ORDER — ATROPINE SULFATE 0.4 MG/ML
0.4 INJECTION, SOLUTION ENDOTRACHEAL; INTRAMEDULLARY; INTRAMUSCULAR; INTRAVENOUS; SUBCUTANEOUS ONCE AS NEEDED
Status: CANCELLED | OUTPATIENT
Start: 2024-01-25

## 2024-01-25 RX ORDER — EPINEPHRINE 0.3 MG/.3ML
0.3 INJECTION SUBCUTANEOUS ONCE AS NEEDED
Status: DISCONTINUED | OUTPATIENT
Start: 2024-01-25 | End: 2024-01-25 | Stop reason: HOSPADM

## 2024-01-25 RX ORDER — SODIUM CHLORIDE 0.9 % (FLUSH) 0.9 %
10 SYRINGE (ML) INJECTION
Status: CANCELLED | OUTPATIENT
Start: 2024-01-25

## 2024-01-25 RX ORDER — HEPARIN 100 UNIT/ML
500 SYRINGE INTRAVENOUS
Status: DISCONTINUED | OUTPATIENT
Start: 2024-01-25 | End: 2024-01-25 | Stop reason: HOSPADM

## 2024-01-25 RX ORDER — SODIUM CHLORIDE 0.9 % (FLUSH) 0.9 %
10 SYRINGE (ML) INJECTION
Status: CANCELLED | OUTPATIENT
Start: 2024-01-26

## 2024-01-25 RX ORDER — ATROPINE SULFATE 0.4 MG/ML
0.4 INJECTION, SOLUTION ENDOTRACHEAL; INTRAMEDULLARY; INTRAMUSCULAR; INTRAVENOUS; SUBCUTANEOUS ONCE AS NEEDED
Status: COMPLETED | OUTPATIENT
Start: 2024-01-25 | End: 2024-01-25

## 2024-01-25 RX ADMIN — ATROPINE SULFATE 0.4 MG: 0.4 INJECTION, SOLUTION INTRAVENOUS at 02:01

## 2024-01-25 RX ADMIN — BEVACIZUMAB-AWWB 300 MG: 100 INJECTION, SOLUTION INTRAVENOUS at 01:01

## 2024-01-25 RX ADMIN — SODIUM CHLORIDE 300 MG: 9 INJECTION, SOLUTION INTRAVENOUS at 02:01

## 2024-01-25 RX ADMIN — FLUOROURACIL 4000 MG: 50 INJECTION, SOLUTION INTRAVENOUS at 04:01

## 2024-01-25 RX ADMIN — DEXAMETHASONE SODIUM PHOSPHATE 0.25 MG: 4 INJECTION, SOLUTION INTRA-ARTICULAR; INTRALESIONAL; INTRAMUSCULAR; INTRAVENOUS; SOFT TISSUE at 02:01

## 2024-01-25 RX ADMIN — SODIUM CHLORIDE: 9 INJECTION, SOLUTION INTRAVENOUS at 12:01

## 2024-01-25 NOTE — PLAN OF CARE
1230  Patient seated in chair accompanied by daughter.  VSS, assessment done.  Port accessed, flushed, blood return noted, started NS @ 50 cc/hr KVO while waiting for MVASI, Irinotecan, 5FU from pharmacy.  [facility] for safety

## 2024-01-25 NOTE — PLAN OF CARE
1630  Patient completed MVASI, Irinotecan infusions, tolerated well.  CADD pump connected to port to infuse @ 2.3 cc/hr for 43 hours for a total of 100 cc of 5FU.  Patient aware to RTC @1130 on Sat. 1/27/24 for pump DC.  Patient ambulated off floor accompanied by daughter.

## 2024-01-25 NOTE — PROGRESS NOTES
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 Bonilla  12/30/2022: Y-90 to R liver  1/27/2023: Y-90 to R liver  5/17/2023: Y-90 to R liver  7/11/2023: C1 FOLFIRI + Pema  7/26/2023: C2 FOLFIRI + Pema  8/8/2023: C3 FOLFIRI + Pema  8/22/23: C4 FOLFIRI + Pema  Restaging CT CAP 9/1/23: Good response to chemo.  9/7/23: C5 FOLFIRI + Pema  9/19/23: C6 FOLFIRI + Pema  10/3/23: C7 FOLFIRI + Pema  10/16/23: C8 FOLFIRI + Pema  10/30/23: C9 FOLFIRI + Pema  11/13/23: C10 FOLFIRI + Pema  11/27/23: C11 FOLFIRI + Pema  12/11/23: C12 FOLFIRI + Pema    Interval History:   He presents today with his daughter prior to cycle 15 of FOLFIRI plus avastin. He continues to feel very well without new adverse effects from chemotherapy.  Denies fatigue, n/v/d, abdominal pain. Eating and drinking well.     ECOG status is 1. Presents with his daughter today.    Past Medical History:   Past Medical History:   Diagnosis Date    MARIA A (acute kidney injury) 8/18/2022    Hypertension     Metastatic colon cancer to liver 4/22/2021     Past Surgical HIstory:   Past Surgical History:   Procedure Laterality Date    COLONOSCOPY N/A 4/20/2021    Procedure: COLONOSCOPY with possible stent;  Surgeon: EDIMLA Bonilla MD;  Location: [facility] ([address] [place] [address]);  Service: Colon and Rectal;  Laterality: N/A;    COLONOSCOPY  N/A 11/3/2023    Procedure: COLONOSCOPY;  Surgeon: EDILMA Bonilla MD;  Location: [facility] ENDO (4TH FLR);  Service: Endoscopy;  Laterality: N/A;  prep instructions sent to pt via portal  From: EDILMA Bonilla MD  Procedure: Colonoscopy  Diagnosis: Surveillance colonoscopy - Hx of colon cancer  Procedure Timin-12 weeks  Provider: Myself  Location: [facility] 4Endo  Additional Scheduling Information: No scheduling con    DIAGNOSTIC LAPAROSCOPY N/A 2022    Procedure: LAPAROSCOPY, DIAGNOSTIC;  Surgeon: Adrian Rodriguez MD;  Location: [facility] OR 2ND FLR;  Service: General;  Laterality: N/A;    INSERTION OF TUNNELED CENTRAL VENOUS CATHETER (CVC) WITH SUBCUTANEOUS PORT N/A 5/3/2021    Procedure: [id number];  Surgeon: Francisco Layton MD;  Location: [facility] OR 2ND FLR;  Service: Vascular;  Laterality: N/A;    OMENTECTOMY N/A 10/20/2022    Procedure: OMENTECTOMY;  Surgeon: EDILMA Bonilla MD;  Location: [facility] OR 2ND FLR;  Service: Colon and Rectal;  Laterality: N/A;    RIGHT HEMICOLECTOMY N/A 10/20/2022    Procedure: HEMICOLECTOMY, RIGHT, extended;  Surgeon: EDILMA Bonilla MD;  Location: [facility] OR 2ND FLR;  Service: Colon and Rectal;  Laterality: N/A;  Extended right hemicolectomy CONSENT IN AM     Family History:   Family History   Problem Relation Age of Onset    Cancer Brother        Social History:  reports that he has never smoked. He has never used smokeless tobacco. He reports that he does not currently use alcohol. He reports that he does not use drugs.    Allergies:  Review of patient's allergies indicates:  No Known Allergies    Medications:  Current Outpatient Medications   Medication Sig Dispense Refill    acetaminophen (TYLENOL) 500 MG tablet Take 1 tablet (500 mg total) by mouth every 6 (six) hours as needed for Pain (alternate with ibuprofen). (Patient not taking: Reported on 2023)  0    amLODIPine (NORVASC) 10 MG tablet Take 1 tablet (10 mg total) by mouth once daily. 90 tablet 3    ibuprofen  (ADVIL,MOTRIN) 400 MG tablet Take 1 tablet (400 mg total) by mouth every 6 (six) hours as needed for Other (pain). Alternate with tylenol (Patient not taking: Reported on 12/26/2023)      ondansetron (ZOFRAN) 4 MG tablet Take 1 tablet (4 mg total) by mouth every 8 (eight) hours as needed for Nausea. (Patient not taking: Reported on 12/26/2023) 30 tablet 3    oxyCODONE (ROXICODONE) 5 MG immediate release tablet Take 1 tablet (5 mg total) by mouth every 6 (six) hours as needed for Pain. (Patient not taking: Reported on 12/26/2023) 20 tablet 0    tamsulosin (FLOMAX) 0.4 mg Cap Take 1 capsule (0.4 mg total) by mouth once daily. (Patient not taking: Reported on 6/28/2023) 30 capsule 5     No current facility-administered medications for this visit.     Review of Systems   Constitutional:  Negative for activity change, appetite change, chills, diaphoresis, fatigue, fever and unexpected weight change.   HENT:  Negative for congestion, mouth sores, nosebleeds, postnasal drip, rhinorrhea, trouble swallowing and voice change.    Eyes:  Negative for pain and visual disturbance.   Respiratory:  Negative for cough, chest tightness, shortness of breath and wheezing.    Cardiovascular:  Negative for chest pain, palpitations and leg swelling.   Gastrointestinal:  Negative for abdominal distention, abdominal pain, blood in stool, constipation, diarrhea, nausea and vomiting.   Genitourinary:  Negative for difficulty urinating, dysuria, flank pain, frequency, hematuria and urgency.   Musculoskeletal:  Negative for arthralgias, back pain and myalgias.   Skin:  Negative for rash and wound.   Neurological:  Negative for dizziness, facial asymmetry, weakness, light-headedness, numbness and headaches.   Psychiatric/Behavioral:  Negative for agitation, behavioral problems, confusion, decreased concentration, dysphoric mood and sleep disturbance. The patient is not nervous/anxious.    All other systems reviewed and are negative.    ECOG  "Performance Status: 1     Objective:      Vitals:   Vitals:    01/25/24 1059   BP: 106/65   BP Location: Left arm   Patient Position: Sitting   BP Method: Large (Automatic)   Pulse: 81   Resp: 18   SpO2: 100%   Weight: 61.3 kg (135 lb 0.5 oz)   Height: 5' 5" (1.651 m)       Physical Exam  Vitals reviewed.   Constitutional:       General: He is not in acute distress.     Appearance: Normal appearance. He is not ill-appearing, toxic-appearing or diaphoretic.   HENT:      Head: Normocephalic and atraumatic.      Right Ear: External ear normal.      Left Ear: External ear normal.      Nose: Nose normal.      Mouth/Throat:      Pharynx: Oropharynx is clear.   Eyes:      General: No scleral icterus.     Extraocular Movements: Extraocular movements intact.      Conjunctiva/sclera: Conjunctivae normal.      Pupils: Pupils are equal, round, and reactive to light.   Cardiovascular:      Rate and Rhythm: Normal rate and regular rhythm.      Pulses: Normal pulses.      Heart sounds: Normal heart sounds. No murmur heard.  Pulmonary:      Effort: Pulmonary effort is normal. No respiratory distress.      Breath sounds: Normal breath sounds. No wheezing.   Chest:      Comments: Port to RCW, no signs of infection.  Abdominal:      General: Abdomen is flat. Bowel sounds are normal. There is no distension.      Palpations: Abdomen is soft. There is no mass.      Tenderness: There is no abdominal tenderness.      Comments: Vertical midline abdominal wound well healed   Musculoskeletal:         General: No swelling or deformity. Normal range of motion.      Right lower leg: No edema.      Left lower leg: No edema.   Skin:     Coloration: Skin is not jaundiced or pale.      Findings: No bruising, erythema or rash.   Neurological:      General: No focal deficit present.      Mental Status: He is alert and oriented to person, place, and time. Mental status is at baseline.      Cranial Nerves: No cranial nerve deficit.      Sensory: No " sensory deficit.      Gait: Gait normal.   Psychiatric:         Mood and Affect: Mood normal.         Behavior: Behavior normal.         Thought Content: Thought content normal.         Judgment: Judgment normal.     Laboratory Data:  Lab Visit on 01/25/2024   Component Date Value Ref Range Status    Specimen UA 01/25/2024 Urine, Clean Catch   Final    Color, UA 01/25/2024 Yellow  Yellow, Straw, [place] Final    Appearance, UA 01/25/2024 Clear  Clear Final    pH, UA 01/25/2024 6.0  5.0 - 8.0 Final    Specific Gravity, UA 01/25/2024 1.015  1.005 - 1.030 Final    Protein, UA 01/25/2024 Negative  Negative Final    Comment: Recommend a 24 hour urine protein or a urine   protein/creatinine ratio if globulin induced proteinuria is  clinically suspected.      Glucose, UA 01/25/2024 Negative  Negative Final    Ketones, UA 01/25/2024 Negative  Negative Final    Bilirubin (UA) 01/25/2024 Negative  Negative Final    Occult Blood UA 01/25/2024 Negative  Negative Final    Nitrite, UA 01/25/2024 Negative  Negative Final    Leukocytes, UA 01/25/2024 Negative  Negative Final   Lab Visit on 01/25/2024   Component Date Value Ref Range Status    CEA 01/25/2024 4.2  0.0 - 5.0 ng/mL Final    Comment: CEA Normal Range:  Non-Smokers: 0-3.0 ng/mL  Smokers:     0-5.0 ng/mL    The testing method is a chemiluminescent microparticle immunoassay   manufactured by [employer] and performed on the    or   [employer] system. Values obtained with different assay manufacturers   for   methods may be different and cannot be used interchangeably.      Sodium 01/25/2024 140  136 - 145 mmol/L Final    Potassium 01/25/2024 4.6  3.5 - 5.1 mmol/L Final    Chloride 01/25/2024 110  95 - 110 mmol/L Final    CO2 01/25/2024 24  23 - 29 mmol/L Final    Glucose 01/25/2024 89  70 - 110 mg/dL Final    BUN 01/25/2024 10  8 - 23 mg/dL Final    Creatinine 01/25/2024 0.9  0.5 - 1.4 mg/dL Final    Calcium 01/25/2024 9.6  8.7 - 10.5 mg/dL Final    Total  Protein 01/25/2024 7.1  6.0 - 8.4 g/dL Final    Albumin 01/25/2024 3.4 (L)  3.5 - 5.2 g/dL Final    Total Bilirubin 01/25/2024 1.3 (H)  0.1 - 1.0 mg/dL Final    Comment: For infants and newborns, interpretation of results should be based  on gestational age, weight and in agreement with clinical  observations.    Premature Infant recommended reference ranges:  Up to 24 hours.............<8.0 mg/dL  Up to 48 hours............<12.0 mg/dL  3-5 days..................<15.0 mg/dL  6-29 days.................<15.0 mg/dL      Alkaline Phosphatase 01/25/2024 144 (H)  55 - 135 U/L Final    AST 01/25/2024 27  10 - 40 U/L Final    ALT 01/25/2024 19  10 - 44 U/L Final    eGFR 01/25/2024 >60.0  >60 mL/min/1.73 m^2 Final    Anion Gap 01/25/2024 6 (L)  8 - 16 mmol/L Final    WBC 01/25/2024 4.10  3.90 - 12.70 K/uL Final    RBC 01/25/2024 4.33 (L)  4.60 - 6.20 M/uL Final    Hemoglobin 01/25/2024 11.4 (L)  14.0 - 18.0 g/dL Final    Hematocrit 01/25/2024 38.0 (L)  40.0 - 54.0 % Final    MCV 01/25/2024 88  82 - 98 fL Final    MCH 01/25/2024 26.3 (L)  27.0 - 31.0 pg Final    MCHC 01/25/2024 30.0 (L)  32.0 - 36.0 g/dL Final    RDW 01/25/2024 18.0 (H)  11.5 - 14.5 % Final    Platelets 01/25/2024 203  150 - 450 K/uL Final    MPV 01/25/2024 10.3  9.2 - 12.9 fL Final    Gran # (ANC) 01/25/2024 1.7 (L)  1.8 - 7.7 K/uL Final    Comment: The ANC is based on a white cell differential from an   automated cell counter. It has not been microscopically   reviewed for the presence of abnormal cells. Clinical   correlation is required.      Immature Grans (Abs) 01/25/2024 0.01  0.00 - 0.04 K/uL Final    Comment: Mild elevation in immature granulocytes is non specific and   can be seen in a variety of conditions including stress response,   acute inflammation, trauma and pregnancy. Correlation with other   laboratory and clinical findings is essential.       Assessment and Plan        1. Metastatic colon cancer to liver    2. Immunodeficiency secondary  to neoplasm    3. Immunodeficiency due to chemotherapy    4. Hypertension, unspecified type    5. MARIA A (acute kidney injury)    6. Anemia associated with chemotherapy    7. Pain    8. Drug-induced constipation    9. Severe malnutrition    10. Lower urinary tract symptoms (LUTS)          1-3.  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  Guardant 360 was negative for BRAF, VIRI, HER2 amplification.   Pretreatment CEA 57.  We had a long and sonia discussion with him about his diagnosis. Unfortunately, the disease is not curable but remains treatable and he has good performance status.   Restaging scans after 7 cycles of FOLFOX + Pema showed significant reduction in colonic mass and liver mass.   we switched to maintenance as of cycle 8 with 5FU + Pema  Restaging scans from March 2022 show stable disease.   MRI on 7/18/22 showing hepatic progression of disease in the right hepatic lobe noted on the exam. CT CAP on 8/26 shows some mild progression in both liver metastases.    Discussed case at colorectal tumor board 8/31/22 and had a diagnostic lap performed by Dr. Rodriguez on 9/7 to assess for any occult peritoneal disease. Findings from the diagnostic lap were negative. Fluid from around from around the primary mass was sent for cytology that was negative for malignancy.   Underwent primary tumor resection on 10/20/22 with Dr. Bonilla.    Meanwhile his liver mets had grown.  We discussed his case at [facility] conference with plans for short term Y-90 and then restarting chemotherapy prior to considering any surgical options to his liver metastases.  He underwent Y-90 delivery on 12/30/22. Tolerated well.   CT CAP on 1/23/23 reviewed at [facility] conference with good response to Y-90, no new lesions.  Underwent second Y-90 on 1/27/23. Can consider R hepatectomy pending follow-up imaging after Y-90.     Received cycle 42 of FOLFOX (omitted oxaliplatin again starting cycle 41 due to neuropathy) on  2/9/23.  Because of 5-FU shortage nationally, we have been holding chemotherapy since mid-February 2023.  If he needs to restart chemotherapy (I.e. no plans for surgical resection), will place him on capecitabine maintenance for duration of 5-FU shortage.     Discussed at colorectal liver mets tumor board 3/16/23.  Plan for repeat Y-90 and then consideration of surgical resection and he will meet with liver transplant team as well.  Underwent Y-90 delivery 5/17/23 with IR.     Has been on maintenance Xeloda since late April 2023.    Met with liver transplant team but ultimately opted not to proceed with transplant as he was concerned about how he would tolerate a major surgery with the life changes that would come after transplant as well. They closed out his case.    He met with Dr. Rodriguez to discuss whether surgical resection is indicated for his liver mets.  He recommended repeat MRI.  Repeat MRI unfortunately showed disease progression while on Xeloda maintenance.  Similarly his CEA garrett precipitously, all in keeping with worsening disease.    We recommended we restart IV chemotherapy with FOLFIRI + Avastin (never had irinotecan).    Because of hyperbilirubinemia he received cycle 1 with just 5-FU + Avastin on 7/11/23. Tolerated this well. Bilirubin has improved.  Received irinotecan starting with cycle 2.  Repeat CT CAP after cycle 4 shows good response to therapy.   Excellent tolerance. [facility] tumor board agrees with continuation of chemotherapy.   Repeat CT CAP after cycle 7 shows stable disease, no evidence of new or worsening disease.   Repeat CT CAP after cycle 11 shows stable disease.     Doing well today.   Labs reviewed, adequate for chemo.   Will proceed with cycle 15 today.    He will get CT CAP prior to next cycle     -RTC in 2 weeks for next cycle with UA. Requested to add pharmacogenomics to next lab draw    Tempus: APC, CKS1B cng, ERCC3 cnl, PIK3CA; KENIA, TMB 12.1.    4. Hypertension   -- BP well  controlled today. Feels well.   -- Following with PCP.   -- encouraged him and his daughter to monitor BP and HR closely at home.     5. MARIA A  -- Resolved   -- Monitor. Encouraged continued hydration particularly with working outside.     6. Anemia  -- Hgb stable. Monitor.  -- No signs of bleeding. Platelets normal.     7-10. Neoplasm related pain, weight loss, constipation  -- Pain very well controlled. Has oxycodone 5mg to use PRN but not requiring now.  -- Following with nutrition. Encouraged increased protein. Monitor.  -- Continue Miralax daily for constipation.    11, Urinary Hesitancy.  -- Continue Flomax.  -- Reports improvement since starting medication.     Patient is in agreement with the proposed treatment plan. All questions were answered to the patient's satisfaction. Pt knows to call clinic if anything is needed before the next clinic visit.    Discussed with Dr. Landen Cintron, DO  PGY-VI  Hematology/Oncology Fellow    Route Chart for Scheduling    Med Onc Chart Routing      Follow up with physician . F/u 2/22 with labs prior, chemo after   Follow up with RACHEL    Infusion scheduling note   folfiri + dwight 2/22, 2/24   Injection scheduling note    Labs   Scheduling:  Preferred lab:  Lab interval:  Add pharmacogenomics to next lab draw. labs 2/22: CBC, CMP, CEA, urinalysis   Imaging    Pharmacy appointment    Other referrals

## 2024-01-27 ENCOUNTER — INFUSION (OUTPATIENT)
Dept: INFUSION THERAPY | Facility: HOSPITAL | Age: 74
End: 2024-01-27
Payer: MEDICARE

## 2024-01-27 VITALS — SYSTOLIC BLOOD PRESSURE: 114 MMHG | DIASTOLIC BLOOD PRESSURE: 69 MMHG | HEART RATE: 97 BPM

## 2024-01-27 DIAGNOSIS — C78.7 METASTATIC COLON CANCER TO LIVER: Primary | ICD-10-CM

## 2024-01-27 DIAGNOSIS — C18.9 METASTATIC COLON CANCER TO LIVER: Primary | ICD-10-CM

## 2024-01-27 PROCEDURE — 63600175 PHARM REV CODE 636 W HCPCS: Performed by: INTERNAL MEDICINE

## 2024-01-27 PROCEDURE — 25000003 PHARM REV CODE 250: Performed by: INTERNAL MEDICINE

## 2024-01-27 PROCEDURE — A4216 STERILE WATER/SALINE, 10 ML: HCPCS | Performed by: INTERNAL MEDICINE

## 2024-01-27 RX ORDER — SODIUM CHLORIDE 0.9 % (FLUSH) 0.9 %
10 SYRINGE (ML) INJECTION
Status: DISCONTINUED | OUTPATIENT
Start: 2024-01-27 | End: 2024-01-27 | Stop reason: HOSPADM

## 2024-01-27 RX ORDER — HEPARIN 100 UNIT/ML
500 SYRINGE INTRAVENOUS
Status: DISCONTINUED | OUTPATIENT
Start: 2024-01-27 | End: 2024-01-27 | Stop reason: HOSPADM

## 2024-01-27 RX ADMIN — HEPARIN 500 UNITS: 100 SYRINGE at 11:01

## 2024-01-27 RX ADMIN — Medication 10 ML: at 11:01

## 2024-01-27 NOTE — NURSING
Patient seated in chair accompanied by daughter, VSS, assessment done, CADD volume 0, infusion complete, tolerated well.  CADD disconnected from port.  Port deaccessed, flushed, blood return noted, heparin locked.  Patient ambulated off floor accompanied by daughter.

## 2024-02-05 ENCOUNTER — HOSPITAL ENCOUNTER (OUTPATIENT)
Dept: RADIOLOGY | Facility: HOSPITAL | Age: 74
Discharge: HOME | End: 2024-02-05
Attending: REGISTERED NURSE
Payer: MEDICARE

## 2024-02-05 DIAGNOSIS — C78.7 METASTATIC COLON CANCER TO LIVER: ICD-10-CM

## 2024-02-05 DIAGNOSIS — C18.9 METASTATIC COLON CANCER TO LIVER: ICD-10-CM

## 2024-02-05 PROCEDURE — 25500020 PHARM REV CODE 255: Performed by: REGISTERED NURSE

## 2024-02-05 PROCEDURE — 71260 CT THORAX DX C+: CPT | Mod: 26,,, | Performed by: RADIOLOGY

## 2024-02-05 PROCEDURE — 74177 CT ABD & PELVIS W/CONTRAST: CPT | Mod: TC

## 2024-02-05 PROCEDURE — 74177 CT ABD & PELVIS W/CONTRAST: CPT | Mod: 26,,, | Performed by: RADIOLOGY

## 2024-02-05 RX ADMIN — IOHEXOL 75 ML: 350 INJECTION, SOLUTION INTRAVENOUS at 01:02

## 2024-02-08 ENCOUNTER — INFUSION (OUTPATIENT)
Dept: INFUSION THERAPY | Facility: HOSPITAL | Age: 74
End: 2024-02-08
Payer: MEDICARE

## 2024-02-08 ENCOUNTER — OFFICE VISIT (OUTPATIENT)
Dept: HEMATOLOGY/ONCOLOGY | Facility: CLINIC | Age: 74
End: 2024-02-08
Payer: MEDICARE

## 2024-02-08 VITALS
HEIGHT: 67 IN | TEMPERATURE: 99 F | HEART RATE: 77 BPM | WEIGHT: 132.69 LBS | BODY MASS INDEX: 20.83 KG/M2 | OXYGEN SATURATION: 100 % | SYSTOLIC BLOOD PRESSURE: 117 MMHG | DIASTOLIC BLOOD PRESSURE: 69 MMHG

## 2024-02-08 VITALS
HEART RATE: 68 BPM | OXYGEN SATURATION: 100 % | RESPIRATION RATE: 18 BRPM | SYSTOLIC BLOOD PRESSURE: 114 MMHG | DIASTOLIC BLOOD PRESSURE: 66 MMHG | TEMPERATURE: 98 F

## 2024-02-08 DIAGNOSIS — K59.03 DRUG-INDUCED CONSTIPATION: ICD-10-CM

## 2024-02-08 DIAGNOSIS — D84.821 IMMUNODEFICIENCY DUE TO CHEMOTHERAPY: ICD-10-CM

## 2024-02-08 DIAGNOSIS — C18.9 METASTATIC COLON CANCER TO LIVER: Primary | ICD-10-CM

## 2024-02-08 DIAGNOSIS — R52 PAIN: ICD-10-CM

## 2024-02-08 DIAGNOSIS — D49.9 IMMUNODEFICIENCY SECONDARY TO NEOPLASM: ICD-10-CM

## 2024-02-08 DIAGNOSIS — C78.7 METASTATIC COLON CANCER TO LIVER: Primary | ICD-10-CM

## 2024-02-08 DIAGNOSIS — D84.81 IMMUNODEFICIENCY SECONDARY TO NEOPLASM: ICD-10-CM

## 2024-02-08 DIAGNOSIS — R63.4 WEIGHT DECREASE: ICD-10-CM

## 2024-02-08 DIAGNOSIS — T45.1X5A ANEMIA ASSOCIATED WITH CHEMOTHERAPY: ICD-10-CM

## 2024-02-08 DIAGNOSIS — N17.9 AKI (ACUTE KIDNEY INJURY): ICD-10-CM

## 2024-02-08 DIAGNOSIS — E43 SEVERE MALNUTRITION: ICD-10-CM

## 2024-02-08 DIAGNOSIS — I10 HYPERTENSION, UNSPECIFIED TYPE: ICD-10-CM

## 2024-02-08 DIAGNOSIS — R39.9 LOWER URINARY TRACT SYMPTOMS (LUTS): ICD-10-CM

## 2024-02-08 DIAGNOSIS — T45.1X5A IMMUNODEFICIENCY DUE TO CHEMOTHERAPY: ICD-10-CM

## 2024-02-08 DIAGNOSIS — Z79.899 IMMUNODEFICIENCY DUE TO CHEMOTHERAPY: ICD-10-CM

## 2024-02-08 DIAGNOSIS — D64.81 ANEMIA ASSOCIATED WITH CHEMOTHERAPY: ICD-10-CM

## 2024-02-08 PROCEDURE — 3288F FALL RISK ASSESSMENT DOCD: CPT | Mod: CPTII,S$GLB,, | Performed by: INTERNAL MEDICINE

## 2024-02-08 PROCEDURE — 63600175 PHARM REV CODE 636 W HCPCS: Performed by: INTERNAL MEDICINE

## 2024-02-08 PROCEDURE — 96413 CHEMO IV INFUSION 1 HR: CPT

## 2024-02-08 PROCEDURE — 96415 CHEMO IV INFUSION ADDL HR: CPT

## 2024-02-08 PROCEDURE — 3078F DIAST BP <80 MM HG: CPT | Mod: CPTII,S$GLB,, | Performed by: INTERNAL MEDICINE

## 2024-02-08 PROCEDURE — 1101F PT FALLS ASSESS-DOCD LE1/YR: CPT | Mod: CPTII,S$GLB,, | Performed by: INTERNAL MEDICINE

## 2024-02-08 PROCEDURE — 96367 TX/PROPH/DG ADDL SEQ IV INF: CPT

## 2024-02-08 PROCEDURE — 99999 PR PBB SHADOW E&M-EST. PATIENT-LVL III: CPT | Mod: PBBFAC,,, | Performed by: INTERNAL MEDICINE

## 2024-02-08 PROCEDURE — 96416 CHEMO PROLONG INFUSE W/PUMP: CPT

## 2024-02-08 PROCEDURE — 1126F AMNT PAIN NOTED NONE PRSNT: CPT | Mod: CPTII,S$GLB,, | Performed by: INTERNAL MEDICINE

## 2024-02-08 PROCEDURE — 1159F MED LIST DOCD IN RCRD: CPT | Mod: CPTII,S$GLB,, | Performed by: INTERNAL MEDICINE

## 2024-02-08 PROCEDURE — 3074F SYST BP LT 130 MM HG: CPT | Mod: CPTII,S$GLB,, | Performed by: INTERNAL MEDICINE

## 2024-02-08 PROCEDURE — 25000003 PHARM REV CODE 250: Performed by: INTERNAL MEDICINE

## 2024-02-08 PROCEDURE — 96417 CHEMO IV INFUS EACH ADDL SEQ: CPT

## 2024-02-08 PROCEDURE — 99215 OFFICE O/P EST HI 40 MIN: CPT | Mod: S$GLB,,, | Performed by: INTERNAL MEDICINE

## 2024-02-08 PROCEDURE — 3008F BODY MASS INDEX DOCD: CPT | Mod: CPTII,S$GLB,, | Performed by: INTERNAL MEDICINE

## 2024-02-08 RX ORDER — EPINEPHRINE 0.3 MG/.3ML
0.3 INJECTION SUBCUTANEOUS ONCE AS NEEDED
Status: CANCELLED | OUTPATIENT
Start: 2024-02-08

## 2024-02-08 RX ORDER — SODIUM CHLORIDE 0.9 % (FLUSH) 0.9 %
10 SYRINGE (ML) INJECTION
Status: CANCELLED | OUTPATIENT
Start: 2024-02-09

## 2024-02-08 RX ORDER — SODIUM CHLORIDE 0.9 % (FLUSH) 0.9 %
10 SYRINGE (ML) INJECTION
Status: DISCONTINUED | OUTPATIENT
Start: 2024-02-08 | End: 2024-02-08 | Stop reason: HOSPADM

## 2024-02-08 RX ORDER — DIPHENHYDRAMINE HYDROCHLORIDE 50 MG/ML
50 INJECTION INTRAMUSCULAR; INTRAVENOUS ONCE AS NEEDED
Status: CANCELLED | OUTPATIENT
Start: 2024-02-08

## 2024-02-08 RX ORDER — HEPARIN 100 UNIT/ML
500 SYRINGE INTRAVENOUS
Status: CANCELLED | OUTPATIENT
Start: 2024-02-08

## 2024-02-08 RX ORDER — DIPHENHYDRAMINE HYDROCHLORIDE 50 MG/ML
50 INJECTION INTRAMUSCULAR; INTRAVENOUS ONCE AS NEEDED
Status: DISCONTINUED | OUTPATIENT
Start: 2024-02-08 | End: 2024-02-08 | Stop reason: HOSPADM

## 2024-02-08 RX ORDER — EPINEPHRINE 0.3 MG/.3ML
0.3 INJECTION SUBCUTANEOUS ONCE AS NEEDED
Status: DISCONTINUED | OUTPATIENT
Start: 2024-02-08 | End: 2024-02-08 | Stop reason: HOSPADM

## 2024-02-08 RX ORDER — SODIUM CHLORIDE 0.9 % (FLUSH) 0.9 %
10 SYRINGE (ML) INJECTION
Status: CANCELLED | OUTPATIENT
Start: 2024-02-08

## 2024-02-08 RX ORDER — PROCHLORPERAZINE EDISYLATE 5 MG/ML
5 INJECTION INTRAMUSCULAR; INTRAVENOUS ONCE AS NEEDED
Status: DISCONTINUED | OUTPATIENT
Start: 2024-02-08 | End: 2024-02-08 | Stop reason: HOSPADM

## 2024-02-08 RX ORDER — ATROPINE SULFATE 0.4 MG/ML
0.4 INJECTION, SOLUTION ENDOTRACHEAL; INTRAMEDULLARY; INTRAMUSCULAR; INTRAVENOUS; SUBCUTANEOUS ONCE AS NEEDED
Status: DISCONTINUED | OUTPATIENT
Start: 2024-02-08 | End: 2024-02-08 | Stop reason: HOSPADM

## 2024-02-08 RX ORDER — HEPARIN 100 UNIT/ML
500 SYRINGE INTRAVENOUS
Status: CANCELLED | OUTPATIENT
Start: 2024-02-09

## 2024-02-08 RX ORDER — HEPARIN 100 UNIT/ML
500 SYRINGE INTRAVENOUS
Status: DISCONTINUED | OUTPATIENT
Start: 2024-02-08 | End: 2024-02-08 | Stop reason: HOSPADM

## 2024-02-08 RX ORDER — ATROPINE SULFATE 0.4 MG/ML
0.4 INJECTION, SOLUTION ENDOTRACHEAL; INTRAMEDULLARY; INTRAMUSCULAR; INTRAVENOUS; SUBCUTANEOUS ONCE AS NEEDED
Status: CANCELLED | OUTPATIENT
Start: 2024-02-08

## 2024-02-08 RX ORDER — PROCHLORPERAZINE EDISYLATE 5 MG/ML
5 INJECTION INTRAMUSCULAR; INTRAVENOUS ONCE AS NEEDED
Status: CANCELLED
Start: 2024-02-08

## 2024-02-08 RX ADMIN — FLUOROURACIL 4000 MG: 50 INJECTION, SOLUTION INTRAVENOUS at 05:02

## 2024-02-08 RX ADMIN — BEVACIZUMAB-AWWB 300 MG: 100 INJECTION, SOLUTION INTRAVENOUS at 03:02

## 2024-02-08 RX ADMIN — SODIUM CHLORIDE 300 MG: 9 INJECTION, SOLUTION INTRAVENOUS at 04:02

## 2024-02-08 RX ADMIN — SODIUM CHLORIDE: 0.9 INJECTION, SOLUTION INTRAVENOUS at 02:02

## 2024-02-08 RX ADMIN — DEXAMETHASONE SODIUM PHOSPHATE 0.25 MG: 4 INJECTION, SOLUTION INTRA-ARTICULAR; INTRALESIONAL; INTRAMUSCULAR; INTRAVENOUS; SOFT TISSUE at 02:02

## 2024-02-08 NOTE — PLAN OF CARE
Problem: Anemia (Chemotherapy Effects)  Goal: Anemia Symptom Improvement  Outcome: Ongoing, Progressing     Problem: Urinary Bleeding Risk or Actual (Chemotherapy Effects)  Goal: Absence of Hematuria  Outcome: Ongoing, Progressing     Problem: Nausea and Vomiting (Chemotherapy Effects)  Goal: Fluid and Electrolyte Balance  Outcome: Ongoing, Progressing     Problem: Neurotoxicity (Chemotherapy Effects)  Goal: Neurotoxicity Symptom Control  Outcome: Ongoing, Progressing     Problem: Neutropenia (Chemotherapy Effects)  Goal: Absence of Infection  Outcome: Ongoing, Progressing     Problem: Oral Mucositis (Chemotherapy Effects)  Goal: Improved Oral Mucous Membrane Integrity  Outcome: Ongoing, Progressing     Problem: Thrombocytopenia Bleeding Risk (Chemotherapy Effects)  Goal: Absence of Bleeding  Outcome: Ongoing, Progressing     Ambulatory to clinic with family.  No c/o adverse effects or s/s of infection.  PAC accessed, flushed with out difficulty, blood return noted and infused with no problems.  Tolerated MVASI, premeds and irinotecan with williams problems.  5FU attached via CADD pump, screen read RUN and volume decreasing.  RTC Saturday, 2/10/24 at 1 pm.  Ambulatory home with family.  NAD noted.

## 2024-02-08 NOTE — PROGRESS NOTES
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 Bonilla  12/30/2022: Y-90 to R liver  1/27/2023: Y-90 to R liver  5/17/2023: Y-90 to R liver  7/11/2023: C1 FOLFIRI + Pema  7/26/2023: C2 FOLFIRI + Pema  8/8/2023: C3 FOLFIRI + Pema  8/22/23: C4 FOLFIRI + Pema  Restaging CT CAP 9/1/23: Good response to chemo.  9/7/23: C5 FOLFIRI + Pema  ......................................  Restaging CT CAP 10/12/23: Good response to chemo  10/16/23: C8 FOLFIRI + Pema  .......................................  Restaging CT CAP 12/8/23: Good response to chemo  12/11/23: C12 FOLFIRI + Pema  .......................................  Restaging CT CAP 2/5/24: Good response to chemo  2/8/24: C16 FOLFIRI + Pema    Interval History:   He presents today with his daughter prior to cycle 16 of FOLFIRI plus avastin. He is feeling well without any new complaints or concerns.  No bleeding.  Has some mild folliculitis on his scalp from time to time.  No other issues.     ECOG status is 1. Presents with his daughter today.    Past Medical History:   Past Medical History:   Diagnosis Date    MARIA A (acute kidney injury) 8/18/2022    Hypertension     Metastatic colon cancer to liver 4/22/2021     Past Surgical HIstory:   Past Surgical History:   Procedure Laterality Date    COLONOSCOPY N/A 4/20/2021     Procedure: COLONOSCOPY with possible stent;  Surgeon: EDILMA Bonilla MD;  Location: [facility] ENDO (2ND FLR);  Service: Colon and Rectal;  Laterality: N/A;    COLONOSCOPY N/A 11/3/2023    Procedure: COLONOSCOPY;  Surgeon: EDILMA Bonilla MD;  Location: [facility] ENDO (4TH FLR);  Service: Endoscopy;  Laterality: N/A;  prep instructions sent to pt via portal  From: EDILMA Bonilla MD  Procedure: Colonoscopy  Diagnosis: Surveillance colonoscopy - Hx of colon cancer  Procedure Timin-12 weeks  Provider: Myself  Location: [facility] 4-Endo  Additional Scheduling Information: No scheduling con    DIAGNOSTIC LAPAROSCOPY N/A 2022    Procedure: LAPAROSCOPY, DIAGNOSTIC;  Surgeon: Adrian Rodriguez MD;  Location: [facility] OR 2ND FLR;  Service: General;  Laterality: N/A;    INSERTION OF TUNNELED CENTRAL VENOUS CATHETER (CVC) WITH SUBCUTANEOUS PORT N/A 5/3/2021    Procedure: [id number];  Surgeon: Francisco Layton MD;  Location: [facility] OR 2ND FLR;  Service: Vascular;  Laterality: N/A;    OMENTECTOMY N/A 10/20/2022    Procedure: OMENTECTOMY;  Surgeon: EDILMA Bonilla MD;  Location: [facility] OR 2ND FLR;  Service: Colon and Rectal;  Laterality: N/A;    RIGHT HEMICOLECTOMY N/A 10/20/2022    Procedure: HEMICOLECTOMY, RIGHT, extended;  Surgeon: EDILMA Bonilla MD;  Location: [facility] OR 2ND FLR;  Service: Colon and Rectal;  Laterality: N/A;  Extended right hemicolectomy CONSENT IN AM     Family History:   Family History   Problem Relation Age of Onset    Cancer Brother        Social History:  reports that he has never smoked. He has never used smokeless tobacco. He reports that he does not currently use alcohol. He reports that he does not use drugs.    Allergies:  Review of patient's allergies indicates:  No Known Allergies    Medications:  Current Outpatient Medications   Medication Sig Dispense Refill    amLODIPine (NORVASC) 10 MG tablet Take 1 tablet (10 mg total) by mouth once daily. 90 tablet 3    ondansetron (ZOFRAN) 4 MG tablet Take  1 tablet (4 mg total) by mouth every 8 (eight) hours as needed for Nausea. 30 tablet 3    acetaminophen (TYLENOL) 500 MG tablet Take 1 tablet (500 mg total) by mouth every 6 (six) hours as needed for Pain (alternate with ibuprofen). (Patient not taking: Reported on 12/26/2023)  0    ibuprofen (ADVIL,MOTRIN) 400 MG tablet Take 1 tablet (400 mg total) by mouth every 6 (six) hours as needed for Other (pain). Alternate with tylenol (Patient not taking: Reported on 12/26/2023)      oxyCODONE (ROXICODONE) 5 MG immediate release tablet Take 1 tablet (5 mg total) by mouth every 6 (six) hours as needed for Pain. (Patient not taking: Reported on 12/26/2023) 20 tablet 0    tamsulosin (FLOMAX) 0.4 mg Cap Take 1 capsule (0.4 mg total) by mouth once daily. (Patient not taking: Reported on 6/28/2023) 30 capsule 5     No current facility-administered medications for this visit.     Review of Systems   Constitutional:  Negative for activity change, appetite change, chills, diaphoresis, fatigue, fever and unexpected weight change.   HENT:  Negative for congestion, mouth sores, nosebleeds, postnasal drip, rhinorrhea, trouble swallowing and voice change.    Eyes:  Negative for pain and visual disturbance.   Respiratory:  Negative for cough, chest tightness, shortness of breath and wheezing.    Cardiovascular:  Negative for chest pain, palpitations and leg swelling.   Gastrointestinal:  Negative for abdominal distention, abdominal pain, blood in stool, constipation, diarrhea, nausea and vomiting.   Genitourinary:  Negative for difficulty urinating, dysuria, flank pain, frequency, hematuria and urgency.   Musculoskeletal:  Negative for arthralgias, back pain and myalgias.   Skin:  Negative for rash and wound.   Neurological:  Negative for dizziness, facial asymmetry, weakness, light-headedness, numbness and headaches.   Psychiatric/Behavioral:  Negative for agitation, behavioral problems, confusion, decreased concentration, dysphoric  "mood and sleep disturbance. The patient is not nervous/anxious.    All other systems reviewed and are negative.    ECOG Performance Status: 1     Objective:      Vitals:   Vitals:    02/08/24 1311   BP: 117/69   BP Location: Left arm   Patient Position: Sitting   BP Method: Medium (Automatic)   Pulse: 77   Temp: 98.5 °F (36.9 °C)   TempSrc: Oral   SpO2: 100%   Weight: 60.2 kg (132 lb 11.5 oz)   Height: 5' 7" (1.702 m)       Physical Exam  Vitals reviewed.   Constitutional:       General: He is not in acute distress.     Appearance: Normal appearance. He is not ill-appearing, toxic-appearing or diaphoretic.   HENT:      Head: Normocephalic and atraumatic.      Right Ear: External ear normal.      Left Ear: External ear normal.      Nose: Nose normal.      Mouth/Throat:      Pharynx: Oropharynx is clear.   Eyes:      General: No scleral icterus.     Extraocular Movements: Extraocular movements intact.      Conjunctiva/sclera: Conjunctivae normal.      Pupils: Pupils are equal, round, and reactive to light.   Cardiovascular:      Rate and Rhythm: Normal rate and regular rhythm.      Pulses: Normal pulses.      Heart sounds: Normal heart sounds. No murmur heard.  Pulmonary:      Effort: Pulmonary effort is normal. No respiratory distress.      Breath sounds: Normal breath sounds. No wheezing.   Chest:      Comments: Port to RCW, no signs of infection.  Abdominal:      General: Abdomen is flat. Bowel sounds are normal. There is no distension.      Palpations: Abdomen is soft. There is no mass.      Tenderness: There is no abdominal tenderness.      Comments: Vertical midline abdominal wound well healed   Musculoskeletal:         General: No swelling or deformity. Normal range of motion.      Right lower leg: No edema.      Left lower leg: No edema.   Skin:     Coloration: Skin is not jaundiced or pale.      Findings: No bruising, erythema or rash.   Neurological:      General: No focal deficit present.      Mental " Status: He is alert and oriented to person, place, and time. Mental status is at baseline.      Cranial Nerves: No cranial nerve deficit.      Sensory: No sensory deficit.      Gait: Gait normal.   Psychiatric:         Mood and Affect: Mood normal.         Behavior: Behavior normal.         Thought Content: Thought content normal.         Judgment: Judgment normal.     Laboratory Data:  Lab Visit on 02/05/2024   Component Date Value Ref Range Status    Specimen UA 02/05/2024 Urine, Clean Catch   Final    Color, UA 02/05/2024 Yellow  Yellow, Straw, [place] Final    Appearance, UA 02/05/2024 Clear  Clear Final    pH, UA 02/05/2024 6.0  5.0 - 8.0 Final    Specific Gravity, UA 02/05/2024 1.030  1.005 - 1.030 Final    Protein, UA 02/05/2024 Trace (A)  Negative Final    Comment: Recommend a 24 hour urine protein or a urine   protein/creatinine ratio if globulin induced proteinuria is  clinically suspected.      Glucose, UA 02/05/2024 Negative  Negative Final    Ketones, UA 02/05/2024 Negative  Negative Final    Bilirubin (UA) 02/05/2024 Negative  Negative Final    Occult Blood UA 02/05/2024 Negative  Negative Final    Nitrite, UA 02/05/2024 Negative  Negative Final    Leukocytes, UA 02/05/2024 Negative  Negative Final   Lab Visit on 02/05/2024   Component Date Value Ref Range Status    CEA 02/05/2024 3.5  0.0 - 5.0 ng/mL Final    Comment: CEA Normal Range:  Non-Smokers: 0-3.0 ng/mL  Smokers:     0-5.0 ng/mL    The testing method is a chemiluminescent microparticle immunoassay   manufactured by [employer] and performed on the [place]   or   [employer] system. Values obtained with different assay manufacturers   for   methods may be different and cannot be used interchangeably.      Sodium 02/05/2024 141  136 - 145 mmol/L Final    Potassium 02/05/2024 4.2  3.5 - 5.1 mmol/L Final    Chloride 02/05/2024 109  95 - 110 mmol/L Final    CO2 02/05/2024 22 (L)  23 - 29 mmol/L Final    Glucose 02/05/2024 93  70 - 110 mg/dL  Final    BUN 02/05/2024 12  8 - 23 mg/dL Final    Creatinine 02/05/2024 0.9  0.5 - 1.4 mg/dL Final    Calcium 02/05/2024 9.2  8.7 - 10.5 mg/dL Final    Total Protein 02/05/2024 7.0  6.0 - 8.4 g/dL Final    Albumin 02/05/2024 3.2 (L)  3.5 - 5.2 g/dL Final    Total Bilirubin 02/05/2024 0.9  0.1 - 1.0 mg/dL Final    Comment: For infants and newborns, interpretation of results should be based  on gestational age, weight and in agreement with clinical  observations.    Premature Infant recommended reference ranges:  Up to 24 hours.............<8.0 mg/dL  Up to 48 hours............<12.0 mg/dL  3-5 days..................<15.0 mg/dL  6-29 days.................<15.0 mg/dL      Alkaline Phosphatase 02/05/2024 139 (H)  55 - 135 U/L Final    AST 02/05/2024 29  10 - 40 U/L Final    ALT 02/05/2024 21  10 - 44 U/L Final    eGFR 02/05/2024 >60.0  >60 mL/min/1.73 m^2 Final    Anion Gap 02/05/2024 10  8 - 16 mmol/L Final    WBC 02/05/2024 4.11  3.90 - 12.70 K/uL Final    RBC 02/05/2024 4.15 (L)  4.60 - 6.20 M/uL Final    Hemoglobin 02/05/2024 10.8 (L)  14.0 - 18.0 g/dL Final    Hematocrit 02/05/2024 36.1 (L)  40.0 - 54.0 % Final    MCV 02/05/2024 87  82 - 98 fL Final    MCH 02/05/2024 26.0 (L)  27.0 - 31.0 pg Final    MCHC 02/05/2024 29.9 (L)  32.0 - 36.0 g/dL Final    RDW 02/05/2024 17.9 (H)  11.5 - 14.5 % Final    Platelets 02/05/2024 201  150 - 450 K/uL Final    MPV 02/05/2024 10.9  9.2 - 12.9 fL Final    Gran # (ANC) 02/05/2024 2.4  1.8 - 7.7 K/uL Final    Comment: The ANC is based on a white cell differential from an   automated cell counter. It has not been microscopically   reviewed for the presence of abnormal cells. Clinical   correlation is required.      Immature Grans (Abs) 02/05/2024 0.01  0.00 - 0.04 K/uL Final    Comment: Mild elevation in immature granulocytes is non specific and   can be seen in a variety of conditions including stress response,   acute inflammation, trauma and pregnancy. Correlation with other    laboratory and clinical findings is essential.       Assessment and Plan        No diagnosis found.      1-3.  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  Guardant 360 was negative for BRAF, VIRI, HER2 amplification.   Pretreatment CEA 57.  We had a long and sonia discussion with him about his diagnosis. Unfortunately, the disease is not curable but remains treatable and he has good performance status.   Restaging scans after 7 cycles of FOLFOX + Pema showed significant reduction in colonic mass and liver mass.   we switched to maintenance as of cycle 8 with 5FU + Pema  Restaging scans from March 2022 show stable disease.   MRI on 7/18/22 showing hepatic progression of disease in the right hepatic lobe noted on the exam. CT CAP on 8/26 shows some mild progression in both liver metastases.    Discussed case at colorectal tumor board 8/31/22 and had a diagnostic lap performed by Dr. Rodriguez on 9/7 to assess for any occult peritoneal disease. Findings from the diagnostic lap were negative. Fluid from around from around the primary mass was sent for cytology that was negative for malignancy.   Underwent primary tumor resection on 10/20/22 with Dr. Bonilla.    Meanwhile his liver mets had grown.  We discussed his case at [facility] conference with plans for short term Y-90 and then restarting chemotherapy prior to considering any surgical options to his liver metastases.  He underwent Y-90 delivery on 12/30/22. Tolerated well.   CT CAP on 1/23/23 reviewed at [facility] conference with good response to Y-90, no new lesions.  Underwent second Y-90 on 1/27/23. Can consider R hepatectomy pending follow-up imaging after Y-90.     Received cycle 42 of FOLFOX (omitted oxaliplatin again starting cycle 41 due to neuropathy) on 2/9/23.  Because of 5-FU shortage nationally, we have been holding chemotherapy since mid-February 2023.  If he needs to restart chemotherapy (I.e. no plans for surgical resection),  will place him on capecitabine maintenance for duration of 5-FU shortage.     Discussed at colorectal liver mets tumor board 3/16/23.  Plan for repeat Y-90 and then consideration of surgical resection and he will meet with liver transplant team as well.  Underwent Y-90 delivery 5/17/23 with IR.     Has been on maintenance Xeloda since late April 2023.    Met with liver transplant team but ultimately opted not to proceed with transplant as he was concerned about how he would tolerate a major surgery with the life changes that would come after transplant as well. They closed out his case.    He met with Dr. Rodriguez to discuss whether surgical resection is indicated for his liver mets.  He recommended repeat MRI.  Repeat MRI unfortunately showed disease progression while on Xeloda maintenance.  Similarly his CEA garrett precipitously, all in keeping with worsening disease.    We recommended we restart IV chemotherapy with FOLFIRI + Avastin (never had irinotecan).    Because of hyperbilirubinemia he received cycle 1 with just 5-FU + Avastin on 7/11/23. Tolerated this well. Bilirubin has improved.  Received irinotecan starting with cycle 2.  Repeat CT CAP after cycle 4 shows good response to therapy.   Excellent tolerance. [facility] tumor board agrees with continuation of chemotherapy.   Repeat CT CAP after cycle 7 shows stable disease, no evidence of new or worsening disease.   Repeat CT CAP after cycle 11 shows stable disease.   Repeat CT CAP after cycle 15 shows improved disease.    Doing well today.   Labs reviewed, adequate for chemo.   Will proceed with cycle 16 today.   Repeat imaging after cycle 22.     -RTC in 2 weeks for next cycle with UA.     Tempus: APC, CKS1B cng, ERCC3 cnl, PIK3CA; KENIA, TMB 12.1.    4. Hypertension   -- BP well controlled today. Feels well.   -- Following with PCP.   -- encouraged him and his daughter to monitor BP and HR closely at home.     5. MARIA A  -- Resolved   -- Monitor. Encouraged continued  hydration particularly with working outside.     6. Anemia  -- Hgb stable. Monitor.  -- No signs of bleeding. Platelets normal.     7-10. Neoplasm related pain, weight loss, constipation  -- Pain very well controlled. Has oxycodone 5mg to use PRN but not requiring now.  -- Following with nutrition. Encouraged increased protein. Monitor.  -- Continue Miralax daily for constipation.    11, Urinary Hesitancy.  -- Continue Flomax.  -- Reports improvement since starting medication.     Patient is in agreement with the proposed treatment plan. All questions were answered to the patient's satisfaction. Pt knows to call clinic if anything is needed before the next clinic visit.    Bunny Schuster MD  Hematology/Oncology  [facility]      Route Chart for Scheduling    Med Onc Chart Routing      Follow up with physician 4 weeks. for FOLFIRI + Avastin   Follow up with RACHEL 2 weeks. for FOLFIRI + Avastin   Infusion scheduling note    Injection scheduling note    Labs CBC, CMP, CEA and urinalysis   Scheduling:  Preferred lab:  Lab interval:     Imaging    Pharmacy appointment    Other referrals

## 2024-02-10 ENCOUNTER — INFUSION (OUTPATIENT)
Dept: INFUSION THERAPY | Facility: HOSPITAL | Age: 74
End: 2024-02-10
Attending: INTERNAL MEDICINE
Payer: MEDICARE

## 2024-02-10 VITALS — HEIGHT: 67 IN | WEIGHT: 132.69 LBS | BODY MASS INDEX: 20.83 KG/M2

## 2024-02-10 DIAGNOSIS — C18.9 METASTATIC COLON CANCER TO LIVER: Primary | ICD-10-CM

## 2024-02-10 DIAGNOSIS — C78.7 METASTATIC COLON CANCER TO LIVER: Primary | ICD-10-CM

## 2024-02-10 PROCEDURE — 63600175 PHARM REV CODE 636 W HCPCS: Performed by: INTERNAL MEDICINE

## 2024-02-10 PROCEDURE — A4216 STERILE WATER/SALINE, 10 ML: HCPCS | Performed by: INTERNAL MEDICINE

## 2024-02-10 PROCEDURE — 25000003 PHARM REV CODE 250: Performed by: INTERNAL MEDICINE

## 2024-02-10 RX ORDER — HEPARIN 100 UNIT/ML
500 SYRINGE INTRAVENOUS
Status: DISCONTINUED | OUTPATIENT
Start: 2024-02-10 | End: 2024-02-10 | Stop reason: HOSPADM

## 2024-02-10 RX ORDER — SODIUM CHLORIDE 0.9 % (FLUSH) 0.9 %
10 SYRINGE (ML) INJECTION
Status: DISCONTINUED | OUTPATIENT
Start: 2024-02-10 | End: 2024-02-10 | Stop reason: HOSPADM

## 2024-02-10 RX ADMIN — Medication 10 ML: at 12:02

## 2024-02-10 RX ADMIN — HEPARIN 500 UNITS: 100 SYRINGE at 12:02

## 2024-02-21 ENCOUNTER — LAB VISIT (OUTPATIENT)
Dept: LAB | Facility: HOSPITAL | Age: 74
End: 2024-02-21
Attending: INTERNAL MEDICINE
Payer: MEDICARE

## 2024-02-21 ENCOUNTER — OFFICE VISIT (OUTPATIENT)
Dept: HEMATOLOGY/ONCOLOGY | Facility: CLINIC | Age: 74
End: 2024-02-21
Payer: MEDICARE

## 2024-02-21 VITALS
WEIGHT: 134.69 LBS | HEART RATE: 76 BPM | OXYGEN SATURATION: 100 % | BODY MASS INDEX: 21.14 KG/M2 | HEIGHT: 67 IN | DIASTOLIC BLOOD PRESSURE: 70 MMHG | TEMPERATURE: 98 F | SYSTOLIC BLOOD PRESSURE: 111 MMHG

## 2024-02-21 DIAGNOSIS — D64.81 ANEMIA ASSOCIATED WITH CHEMOTHERAPY: ICD-10-CM

## 2024-02-21 DIAGNOSIS — T45.1X5A ANEMIA ASSOCIATED WITH CHEMOTHERAPY: ICD-10-CM

## 2024-02-21 DIAGNOSIS — R52 PAIN: ICD-10-CM

## 2024-02-21 DIAGNOSIS — T45.1X5A IMMUNODEFICIENCY DUE TO CHEMOTHERAPY: ICD-10-CM

## 2024-02-21 DIAGNOSIS — D84.81 IMMUNODEFICIENCY SECONDARY TO NEOPLASM: ICD-10-CM

## 2024-02-21 DIAGNOSIS — Z79.899 IMMUNODEFICIENCY DUE TO CHEMOTHERAPY: ICD-10-CM

## 2024-02-21 DIAGNOSIS — C78.7 METASTATIC COLON CANCER TO LIVER: ICD-10-CM

## 2024-02-21 DIAGNOSIS — I10 HYPERTENSION, UNSPECIFIED TYPE: ICD-10-CM

## 2024-02-21 DIAGNOSIS — D84.821 IMMUNODEFICIENCY DUE TO CHEMOTHERAPY: ICD-10-CM

## 2024-02-21 DIAGNOSIS — D49.9 IMMUNODEFICIENCY SECONDARY TO NEOPLASM: ICD-10-CM

## 2024-02-21 DIAGNOSIS — K59.03 DRUG-INDUCED CONSTIPATION: ICD-10-CM

## 2024-02-21 DIAGNOSIS — N17.9 AKI (ACUTE KIDNEY INJURY): ICD-10-CM

## 2024-02-21 DIAGNOSIS — R63.4 WEIGHT DECREASE: ICD-10-CM

## 2024-02-21 DIAGNOSIS — C18.9 METASTATIC COLON CANCER TO LIVER: ICD-10-CM

## 2024-02-21 DIAGNOSIS — C18.9 METASTATIC COLON CANCER TO LIVER: Primary | ICD-10-CM

## 2024-02-21 DIAGNOSIS — C78.7 METASTATIC COLON CANCER TO LIVER: Primary | ICD-10-CM

## 2024-02-21 LAB
ALBUMIN SERPL BCP-MCNC: 3.1 G/DL (ref 3.5–5.2)
ALP SERPL-CCNC: 134 U/L (ref 55–135)
ALT SERPL W/O P-5'-P-CCNC: 20 U/L (ref 10–44)
ANION GAP SERPL CALC-SCNC: 8 MMOL/L (ref 8–16)
AST SERPL-CCNC: 28 U/L (ref 10–40)
BASOPHILS # BLD AUTO: 0.02 K/UL (ref 0–0.2)
BASOPHILS NFR BLD: 0.7 % (ref 0–1.9)
BILIRUB SERPL-MCNC: 0.9 MG/DL (ref 0.1–1)
BUN SERPL-MCNC: 11 MG/DL (ref 8–23)
CALCIUM SERPL-MCNC: 9.3 MG/DL (ref 8.7–10.5)
CEA SERPL-MCNC: 3 NG/ML (ref 0–5)
CHLORIDE SERPL-SCNC: 108 MMOL/L (ref 95–110)
CO2 SERPL-SCNC: 26 MMOL/L (ref 23–29)
CREAT SERPL-MCNC: 0.9 MG/DL (ref 0.5–1.4)
DIFFERENTIAL METHOD BLD: ABNORMAL
EOSINOPHIL # BLD AUTO: 0 K/UL (ref 0–0.5)
EOSINOPHIL NFR BLD: 1.4 % (ref 0–8)
ERYTHROCYTE [DISTWIDTH] IN BLOOD BY AUTOMATED COUNT: 17.4 % (ref 11.5–14.5)
EST. GFR  (NO RACE VARIABLE): >60 ML/MIN/1.73 M^2
GLUCOSE SERPL-MCNC: 91 MG/DL (ref 70–110)
HCT VFR BLD AUTO: 35.1 % (ref 40–54)
HGB BLD-MCNC: 10.4 G/DL (ref 14–18)
IMM GRANULOCYTES # BLD AUTO: 0.01 K/UL (ref 0–0.04)
IMM GRANULOCYTES NFR BLD AUTO: 0.4 % (ref 0–0.5)
LYMPHOCYTES # BLD AUTO: 0.7 K/UL (ref 1–4.8)
LYMPHOCYTES NFR BLD: 25.8 % (ref 18–48)
MCH RBC QN AUTO: 25.2 PG (ref 27–31)
MCHC RBC AUTO-ENTMCNC: 29.6 G/DL (ref 32–36)
MCV RBC AUTO: 85 FL (ref 82–98)
MONOCYTES # BLD AUTO: 0.7 K/UL (ref 0.3–1)
MONOCYTES NFR BLD: 23.7 % (ref 4–15)
NEUTROPHILS # BLD AUTO: 1.4 K/UL (ref 1.8–7.7)
NEUTROPHILS NFR BLD: 48 % (ref 38–73)
NRBC BLD-RTO: 0 /100 WBC
PLATELET # BLD AUTO: 172 K/UL (ref 150–450)
PMV BLD AUTO: 10 FL (ref 9.2–12.9)
POTASSIUM SERPL-SCNC: 4.2 MMOL/L (ref 3.5–5.1)
PROT SERPL-MCNC: 6.5 G/DL (ref 6–8.4)
RBC # BLD AUTO: 4.13 M/UL (ref 4.6–6.2)
SODIUM SERPL-SCNC: 142 MMOL/L (ref 136–145)
WBC # BLD AUTO: 2.83 K/UL (ref 3.9–12.7)

## 2024-02-21 PROCEDURE — 3288F FALL RISK ASSESSMENT DOCD: CPT | Mod: CPTII,S$GLB,, | Performed by: PHYSICIAN ASSISTANT

## 2024-02-21 PROCEDURE — 85025 COMPLETE CBC W/AUTO DIFF WBC: CPT | Performed by: PHYSICIAN ASSISTANT

## 2024-02-21 PROCEDURE — 80053 COMPREHEN METABOLIC PANEL: CPT | Performed by: PHYSICIAN ASSISTANT

## 2024-02-21 PROCEDURE — 3078F DIAST BP <80 MM HG: CPT | Mod: CPTII,S$GLB,, | Performed by: PHYSICIAN ASSISTANT

## 2024-02-21 PROCEDURE — 1101F PT FALLS ASSESS-DOCD LE1/YR: CPT | Mod: CPTII,S$GLB,, | Performed by: PHYSICIAN ASSISTANT

## 2024-02-21 PROCEDURE — 1159F MED LIST DOCD IN RCRD: CPT | Mod: CPTII,S$GLB,, | Performed by: PHYSICIAN ASSISTANT

## 2024-02-21 PROCEDURE — 3008F BODY MASS INDEX DOCD: CPT | Mod: CPTII,S$GLB,, | Performed by: PHYSICIAN ASSISTANT

## 2024-02-21 PROCEDURE — 99999 PR PBB SHADOW E&M-EST. PATIENT-LVL III: CPT | Mod: PBBFAC,,, | Performed by: PHYSICIAN ASSISTANT

## 2024-02-21 PROCEDURE — 3074F SYST BP LT 130 MM HG: CPT | Mod: CPTII,S$GLB,, | Performed by: PHYSICIAN ASSISTANT

## 2024-02-21 PROCEDURE — 1126F AMNT PAIN NOTED NONE PRSNT: CPT | Mod: CPTII,S$GLB,, | Performed by: PHYSICIAN ASSISTANT

## 2024-02-21 PROCEDURE — 82378 CARCINOEMBRYONIC ANTIGEN: CPT | Performed by: PHYSICIAN ASSISTANT

## 2024-02-21 PROCEDURE — 1160F RVW MEDS BY RX/DR IN RCRD: CPT | Mod: CPTII,S$GLB,, | Performed by: PHYSICIAN ASSISTANT

## 2024-02-21 PROCEDURE — 36415 COLL VENOUS BLD VENIPUNCTURE: CPT | Performed by: PHYSICIAN ASSISTANT

## 2024-02-21 PROCEDURE — 99215 OFFICE O/P EST HI 40 MIN: CPT | Mod: S$GLB,,, | Performed by: PHYSICIAN ASSISTANT

## 2024-02-21 RX ORDER — ATROPINE SULFATE 0.4 MG/ML
0.4 INJECTION, SOLUTION ENDOTRACHEAL; INTRAMEDULLARY; INTRAMUSCULAR; INTRAVENOUS; SUBCUTANEOUS ONCE AS NEEDED
Status: CANCELLED | OUTPATIENT
Start: 2024-02-21

## 2024-02-21 RX ORDER — SODIUM CHLORIDE 0.9 % (FLUSH) 0.9 %
10 SYRINGE (ML) INJECTION
Status: CANCELLED | OUTPATIENT
Start: 2024-02-21

## 2024-02-21 RX ORDER — EPINEPHRINE 0.3 MG/.3ML
0.3 INJECTION SUBCUTANEOUS ONCE AS NEEDED
Status: CANCELLED | OUTPATIENT
Start: 2024-02-21

## 2024-02-21 RX ORDER — SODIUM CHLORIDE 0.9 % (FLUSH) 0.9 %
10 SYRINGE (ML) INJECTION
Status: CANCELLED | OUTPATIENT
Start: 2024-02-23

## 2024-02-21 RX ORDER — DIPHENHYDRAMINE HYDROCHLORIDE 50 MG/ML
50 INJECTION INTRAMUSCULAR; INTRAVENOUS ONCE AS NEEDED
Status: CANCELLED | OUTPATIENT
Start: 2024-02-21

## 2024-02-21 RX ORDER — PROCHLORPERAZINE EDISYLATE 5 MG/ML
5 INJECTION INTRAMUSCULAR; INTRAVENOUS ONCE AS NEEDED
Status: CANCELLED
Start: 2024-02-21

## 2024-02-21 RX ORDER — HEPARIN 100 UNIT/ML
500 SYRINGE INTRAVENOUS
Status: CANCELLED | OUTPATIENT
Start: 2024-02-21

## 2024-02-21 RX ORDER — HEPARIN 100 UNIT/ML
500 SYRINGE INTRAVENOUS
Status: CANCELLED | OUTPATIENT
Start: 2024-02-23

## 2024-02-21 NOTE — PROGRESS NOTES
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 Bonilla  12/30/2022: Y-90 to R liver  1/27/2023: Y-90 to R liver  5/17/2023: Y-90 to R liver  7/11/2023: C1 FOLFIRI + Pmea  7/26/2023: C2 FOLFIRI + Pema  8/8/2023: C3 FOLFIRI + Pema  8/22/23: C4 FOLFIRI + Pema  Restaging CT CAP 9/1/23: Good response to chemo.  9/7/23: C5 FOLFIRI + Pema  ......................................  Restaging CT CAP 10/12/23: Good response to chemo  10/16/23: C8 FOLFIRI + Pema  .......................................  Restaging CT CAP 12/8/23: Good response to chemo  12/11/23: C12 FOLFIRI + Pema  .......................................  Restaging CT CAP 2/5/24: Good response to chemo  2/8/24: C16 FOLFIRI + Pema    Interval History:   He presents today with his daughter prior to cycle 17 of FOLFIRI plus avastin. He is feeling well without any new complaints or concerns.  No bleeding.  Has some mild folliculitis on his scalp from time to time.  No other issues. Had a good holiday with his family. Eating well and has a good appetite.     ECOG status is 1. Presents with his daughter today.    Past Medical History:   Past Medical History:   Diagnosis Date    MARIA A (acute kidney injury) 8/18/2022    Hypertension     Metastatic colon cancer to liver 4/22/2021     Past Surgical HIstory:   Past Surgical  History:   Procedure Laterality Date    COLONOSCOPY N/A 2021    Procedure: COLONOSCOPY with possible stent;  Surgeon: EDILMA Bonilla MD;  Location: [facility] ENDO (2ND FLR);  Service: Colon and Rectal;  Laterality: N/A;    COLONOSCOPY N/A 11/3/2023    Procedure: COLONOSCOPY;  Surgeon: EDILMA Bonilla MD;  Location: [facility] ENDO (4TH FLR);  Service: Endoscopy;  Laterality: N/A;  prep instructions sent to pt via portal  From: EDILMA Bonilla MD  Procedure: Colonoscopy  Diagnosis: Surveillance colonoscopy - Hx of colon cancer  Procedure Timin-12 weeks  Provider: Myself  Location: [facility] 4Endo  Additional Scheduling Information: No scheduling con    DIAGNOSTIC LAPAROSCOPY N/A 2022    Procedure: LAPAROSCOPY, DIAGNOSTIC;  Surgeon: Adrian Rodriguez MD;  Location: [facility] OR 2ND FLR;  Service: General;  Laterality: N/A;    INSERTION OF TUNNELED CENTRAL VENOUS CATHETER (CVC) WITH SUBCUTANEOUS PORT N/A 5/3/2021    Procedure: [id number];  Surgeon: Francisco Layton MD;  Location: [facility] OR 2ND FLR;  Service: Vascular;  Laterality: N/A;    OMENTECTOMY N/A 10/20/2022    Procedure: OMENTECTOMY;  Surgeon: EDILMA Bonilla MD;  Location: [facility] OR 2ND FLR;  Service: Colon and Rectal;  Laterality: N/A;    RIGHT HEMICOLECTOMY N/A 10/20/2022    Procedure: HEMICOLECTOMY, RIGHT, extended;  Surgeon: EDILMA Bonilla MD;  [facility];  Service: Colon and Rectal;  Laterality: N/A;  Extended right hemicolectomy CONSENT IN AM     Family History:   Family History   Problem Relation Age of Onset    Cancer Brother        Social History:  reports that he has never smoked. He has never used smokeless tobacco. He reports that he does not currently use alcohol. He reports that he does not use drugs.    Allergies:  Review of patient's allergies indicates:  No Known Allergies    Medications:  Current Outpatient Medications   Medication Sig Dispense Refill    amLODIPine (NORVASC) 10 MG tablet Take 1 tablet (10 mg total) by  mouth once daily. 90 tablet 3    ondansetron (ZOFRAN) 4 MG tablet Take 1 tablet (4 mg total) by mouth every 8 (eight) hours as needed for Nausea. 30 tablet 3    acetaminophen (TYLENOL) 500 MG tablet Take 1 tablet (500 mg total) by mouth every 6 (six) hours as needed for Pain (alternate with ibuprofen). (Patient not taking: Reported on 12/26/2023)  0    ibuprofen (ADVIL,MOTRIN) 400 MG tablet Take 1 tablet (400 mg total) by mouth every 6 (six) hours as needed for Other (pain). Alternate with tylenol (Patient not taking: Reported on 12/26/2023)      oxyCODONE (ROXICODONE) 5 MG immediate release tablet Take 1 tablet (5 mg total) by mouth every 6 (six) hours as needed for Pain. (Patient not taking: Reported on 12/26/2023) 20 tablet 0    tamsulosin (FLOMAX) 0.4 mg Cap Take 1 capsule (0.4 mg total) by mouth once daily. (Patient not taking: Reported on 6/28/2023) 30 capsule 5     No current facility-administered medications for this visit.     Review of Systems   Constitutional:  Negative for activity change, appetite change, chills, diaphoresis, fatigue, fever and unexpected weight change.   HENT:  Negative for congestion, mouth sores, nosebleeds, postnasal drip, rhinorrhea, trouble swallowing and voice change.    Eyes:  Negative for pain and visual disturbance.   Respiratory:  Negative for cough, chest tightness, shortness of breath and wheezing.    Cardiovascular:  Negative for chest pain, palpitations and leg swelling.   Gastrointestinal:  Negative for abdominal distention, abdominal pain, blood in stool, constipation, diarrhea, nausea and vomiting.   Genitourinary:  Negative for difficulty urinating, dysuria, flank pain, frequency, hematuria and urgency.   Musculoskeletal:  Negative for arthralgias, back pain and myalgias.   Skin:  Negative for rash and wound.   Neurological:  Negative for dizziness, facial asymmetry, weakness, light-headedness, numbness and headaches.   Psychiatric/Behavioral:  Negative for agitation,  "behavioral problems, confusion, decreased concentration, dysphoric mood and sleep disturbance. The patient is not nervous/anxious.    All other systems reviewed and are negative.    ECOG Performance Status: 1     Objective:      Vitals:   Vitals:    02/21/24 1356   BP: 111/70   BP Location: Left arm   Patient Position: Sitting   BP Method: Medium (Automatic)   Pulse: 76   Temp: 98.4 °F (36.9 °C)   TempSrc: Oral   SpO2: 100%   Weight: 61.1 kg (134 lb 11.2 oz)   Height: 5' 7" (1.702 m)         Physical Exam  Vitals reviewed.   Constitutional:       General: He is not in acute distress.     Appearance: Normal appearance. He is not ill-appearing, toxic-appearing or diaphoretic.   HENT:      Head: Normocephalic and atraumatic.      Right Ear: External ear normal.      Left Ear: External ear normal.      Nose: Nose normal.      Mouth/Throat:      Pharynx: Oropharynx is clear.   Eyes:      General: No scleral icterus.     Extraocular Movements: Extraocular movements intact.      Conjunctiva/sclera: Conjunctivae normal.      Pupils: Pupils are equal, round, and reactive to light.   Cardiovascular:      Rate and Rhythm: Normal rate and regular rhythm.      Pulses: Normal pulses.      Heart sounds: Normal heart sounds. No murmur heard.  Pulmonary:      Effort: Pulmonary effort is normal. No respiratory distress.      Breath sounds: Normal breath sounds. No wheezing.   Chest:      Comments: Port to RCW, no signs of infection.  Abdominal:      General: Abdomen is flat. Bowel sounds are normal. There is no distension.      Palpations: Abdomen is soft. There is no mass.      Tenderness: There is no abdominal tenderness.      Comments: Vertical midline abdominal wound well healed   Musculoskeletal:         General: No swelling or deformity. Normal range of motion.      Right lower leg: No edema.      Left lower leg: No edema.   Skin:     Coloration: Skin is not jaundiced or pale.      Findings: No bruising, erythema or rash. "   Neurological:      General: No focal deficit present.      Mental Status: He is alert and oriented to person, place, and time. Mental status is at baseline.      Cranial Nerves: No cranial nerve deficit.      Sensory: No sensory deficit.      Gait: Gait normal.   Psychiatric:         Mood and Affect: Mood normal.         Behavior: Behavior normal.         Thought Content: Thought content normal.         Judgment: Judgment normal.     Laboratory Data:  Lab Visit on 02/21/2024   Component Date Value Ref Range Status    WBC 02/21/2024 2.83 (L)  3.90 - 12.70 K/uL Final    RBC 02/21/2024 4.13 (L)  4.60 - 6.20 M/uL Final    Hemoglobin 02/21/2024 10.4 (L)  14.0 - 18.0 g/dL Final    Hematocrit 02/21/2024 35.1 (L)  40.0 - 54.0 % Final    MCV 02/21/2024 85  82 - 98 fL Final    MCH 02/21/2024 25.2 (L)  27.0 - 31.0 pg Final    MCHC 02/21/2024 29.6 (L)  32.0 - 36.0 g/dL Final    RDW 02/21/2024 17.4 (H)  11.5 - 14.5 % Final    Platelets 02/21/2024 172  150 - 450 K/uL Final    MPV 02/21/2024 10.0  9.2 - 12.9 fL Final    Immature Granulocytes 02/21/2024 0.4  0.0 - 0.5 % Final    Gran # (ANC) 02/21/2024 1.4 (L)  1.8 - 7.7 K/uL Final    Immature Grans (Abs) 02/21/2024 0.01  0.00 - 0.04 K/uL Final    Comment: Mild elevation in immature granulocytes is non specific and   can be seen in a variety of conditions including stress response,   acute inflammation, trauma and pregnancy. Correlation with other   laboratory and clinical findings is essential.      Lymph # 02/21/2024 0.7 (L)  1.0 - 4.8 K/uL Final    Mono # 02/21/2024 0.7  0.3 - 1.0 K/uL Final    Eos # 02/21/2024 0.0  0.0 - 0.5 K/uL Final    Baso # 02/21/2024 0.02  0.00 - 0.20 K/uL Final    nRBC 02/21/2024 0  0 /100 WBC Final    Gran % 02/21/2024 48.0  38.0 - 73.0 % Final    Lymph % 02/21/2024 25.8  18.0 - 48.0 % Final    Mono % 02/21/2024 23.7 (H)  4.0 - 15.0 % Final    Eosinophil % 02/21/2024 1.4  0.0 - 8.0 % Final    Basophil % 02/21/2024 0.7  0.0 - 1.9 % Final     Differential Method 02/21/2024 Automated   Final    Sodium 02/21/2024 142  136 - 145 mmol/L Final    Potassium 02/21/2024 4.2  3.5 - 5.1 mmol/L Final    Chloride 02/21/2024 108  95 - 110 mmol/L Final    CO2 02/21/2024 26  23 - 29 mmol/L Final    Glucose 02/21/2024 91  70 - 110 mg/dL Final    BUN 02/21/2024 11  8 - 23 mg/dL Final    Creatinine 02/21/2024 0.9  0.5 - 1.4 mg/dL Final    Calcium 02/21/2024 9.3  8.7 - 10.5 mg/dL Final    Total Protein 02/21/2024 6.5  6.0 - 8.4 g/dL Final    Albumin 02/21/2024 3.1 (L)  3.5 - 5.2 g/dL Final    Total Bilirubin 02/21/2024 0.9  0.1 - 1.0 mg/dL Final    Comment: For infants and newborns, interpretation of results should be based  on gestational age, weight and in agreement with clinical  observations.    Premature Infant recommended reference ranges:  Up to 24 hours.............<8.0 mg/dL  Up to 48 hours............<12.0 mg/dL  3-5 days..................<15.0 mg/dL  6-29 days.................<15.0 mg/dL      Alkaline Phosphatase 02/21/2024 134  55 - 135 U/L Final    AST 02/21/2024 28  10 - 40 U/L Final    ALT 02/21/2024 20  10 - 44 U/L Final    eGFR 02/21/2024 >60.0  >60 mL/min/1.73 m^2 Final    Anion Gap 02/21/2024 8  8 - 16 mmol/L Final     Assessment and Plan        1. Metastatic colon cancer to liver    2. Immunodeficiency secondary to neoplasm    3. Immunodeficiency due to chemotherapy    4. Hypertension, unspecified type    5. Anemia associated with chemotherapy    6. MARIA A (acute kidney injury)    7. Pain    8. Drug-induced constipation    9. Weight decrease          1-3.  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  Guardant 360 was negative for BRAF, VIRI, HER2 amplification.   Pretreatment CEA 57.  We had a long and sonia discussion with him about his diagnosis. Unfortunately, the disease is not curable but remains treatable and he has good performance status.   Restaging scans after 7 cycles of FOLFOX + Pema showed significant reduction  in colonic mass and liver mass.   we switched to maintenance as of cycle 8 with 5FU + Pema  Restaging scans from March 2022 show stable disease.   MRI on 7/18/22 showing hepatic progression of disease in the right hepatic lobe noted on the exam. CT CAP on 8/26 shows some mild progression in both liver metastases.    Discussed case at colorectal tumor board 8/31/22 and had a diagnostic lap performed by Dr. Rodriguez on 9/7 to assess for any occult peritoneal disease. Findings from the diagnostic lap were negative. Fluid from around from around the primary mass was sent for cytology that was negative for malignancy.   Underwent primary tumor resection on 10/20/22 with Dr. Bonilla.    Meanwhile his liver mets had grown.  We discussed his case at [facility] conference with plans for short term Y-90 and then restarting chemotherapy prior to considering any surgical options to his liver metastases.  He underwent Y-90 delivery on 12/30/22. Tolerated well.   CT CAP on 1/23/23 reviewed at [facility] conference with good response to Y-90, no new lesions.  Underwent second Y-90 on 1/27/23. Can consider R hepatectomy pending follow-up imaging after Y-90.     Received cycle 42 of FOLFOX (omitted oxaliplatin again starting cycle 41 due to neuropathy) on 2/9/23.  Because of 5-FU shortage nationally, we have been holding chemotherapy since mid-February 2023.  If he needs to restart chemotherapy (I.e. no plans for surgical resection), will place him on capecitabine maintenance for duration of 5-FU shortage.     Discussed at colorectal liver mets tumor board 3/16/23.  Plan for repeat Y-90 and then consideration of surgical resection and he will meet with liver transplant team as well.  Underwent Y-90 delivery 5/17/23 with IR.     Has been on maintenance Xeloda since late April 2023.    Met with liver transplant team but ultimately opted not to proceed with transplant as he was concerned about how he would tolerate a major surgery with the life  changes that would come after transplant as well. They closed out his case.    He met with Dr. Rodriguez to discuss whether surgical resection is indicated for his liver mets.  He recommended repeat MRI.  Repeat MRI unfortunately showed disease progression while on Xeloda maintenance.  Similarly his CEA garrett precipitously, all in keeping with worsening disease.    We recommended we restart IV chemotherapy with FOLFIRI + Avastin (never had irinotecan).    Because of hyperbilirubinemia he received cycle 1 with just 5-FU + Avastin on 7/11/23. Tolerated this well. Bilirubin has improved.  Received irinotecan starting with cycle 2.  Repeat CT CAP after cycle 4 shows good response to therapy.   Excellent tolerance. [facility] tumor board agrees with continuation of chemotherapy.   Repeat CT CAP after cycle 7 shows stable disease, no evidence of new or worsening disease.   Repeat CT CAP after cycle 11 shows stable disease.   Repeat CT CAP after cycle 15 shows improved disease.    Doing well today.   Labs reviewed, adequate for chemo.   Will proceed with cycle 17 today.   Repeat imaging after cycle 22.     -RTC in 2 weeks for next cycle with UA.     Tempus: APC, CKS1B cng, ERCC3 cnl, PIK3CA; KENIA, TMB 12.1.    4. Hypertension   -- BP well controlled today. Feels well.   -- Following with PCP.   -- encouraged him and his daughter to monitor BP and HR closely at home.     5. MARIA A  -- Resolved   -- Monitor. Encouraged continued hydration particularly with working outside.     6. Anemia  -- Hgb stable. Monitor.  -- No signs of bleeding. Platelets normal.     7-10. Neoplasm related pain, weight loss, constipation  -- Pain very well controlled. Has oxycodone 5mg to use PRN but not requiring now.  -- Following with nutrition. Encouraged increased protein. Monitor.  -- Continue Miralax daily for constipation.    11, Urinary Hesitancy.  -- Continue Flomax.  -- Reports improvement since starting medication.     Patient is in agreement with the  proposed treatment plan. All questions were answered to the patient's satisfaction. Pt knows to call clinic if anything is needed before the next clinic visit.      FAN Meier, PA-C  Physician Assistant Certified  Dept of Hematology/Oncology  PA-C to Dr. Mueller, Dr. Schuster and Dr. Castellon     Route Chart for Scheduling    Med Onc Chart Routing      Follow up with physician 2 weeks and 4 weeks. See Dr. Schuster as scheduled in 2 weeks for cycle 18. See Dr. Schuster in 4 weeks for cycle 19   Follow up with RACHEL 6 weeks. See RACHEL in 6 weeks for cycle 20 of FOLFIRI plus avastin   Infusion scheduling note    Injection scheduling note    Labs CBC, CMP, CEA and urinalysis   Scheduling:  Preferred lab:  Lab interval: every 2 weeks     Imaging    Pharmacy appointment    Other referrals

## 2024-02-22 ENCOUNTER — INFUSION (OUTPATIENT)
Dept: INFUSION THERAPY | Facility: HOSPITAL | Age: 74
End: 2024-02-22
Payer: MEDICARE

## 2024-02-22 VITALS
RESPIRATION RATE: 18 BRPM | TEMPERATURE: 98 F | SYSTOLIC BLOOD PRESSURE: 122 MMHG | HEART RATE: 63 BPM | DIASTOLIC BLOOD PRESSURE: 71 MMHG | OXYGEN SATURATION: 100 % | WEIGHT: 134.69 LBS | BODY MASS INDEX: 21.14 KG/M2 | HEIGHT: 67 IN

## 2024-02-22 DIAGNOSIS — C78.7 METASTATIC COLON CANCER TO LIVER: Primary | ICD-10-CM

## 2024-02-22 DIAGNOSIS — C18.9 METASTATIC COLON CANCER TO LIVER: Primary | ICD-10-CM

## 2024-02-22 PROCEDURE — 63600175 PHARM REV CODE 636 W HCPCS: Performed by: PHYSICIAN ASSISTANT

## 2024-02-22 PROCEDURE — 96367 TX/PROPH/DG ADDL SEQ IV INF: CPT

## 2024-02-22 PROCEDURE — 96413 CHEMO IV INFUSION 1 HR: CPT

## 2024-02-22 PROCEDURE — 96416 CHEMO PROLONG INFUSE W/PUMP: CPT

## 2024-02-22 PROCEDURE — 96415 CHEMO IV INFUSION ADDL HR: CPT

## 2024-02-22 PROCEDURE — 96375 TX/PRO/DX INJ NEW DRUG ADDON: CPT

## 2024-02-22 PROCEDURE — 25000003 PHARM REV CODE 250: Performed by: PHYSICIAN ASSISTANT

## 2024-02-22 PROCEDURE — 96417 CHEMO IV INFUS EACH ADDL SEQ: CPT

## 2024-02-22 RX ORDER — SODIUM CHLORIDE 0.9 % (FLUSH) 0.9 %
10 SYRINGE (ML) INJECTION
Status: DISCONTINUED | OUTPATIENT
Start: 2024-02-22 | End: 2024-02-22 | Stop reason: HOSPADM

## 2024-02-22 RX ORDER — ATROPINE SULFATE 0.4 MG/ML
0.4 INJECTION, SOLUTION ENDOTRACHEAL; INTRAMEDULLARY; INTRAMUSCULAR; INTRAVENOUS; SUBCUTANEOUS ONCE AS NEEDED
Status: COMPLETED | OUTPATIENT
Start: 2024-02-22 | End: 2024-02-22

## 2024-02-22 RX ORDER — HEPARIN 100 UNIT/ML
500 SYRINGE INTRAVENOUS
Status: DISCONTINUED | OUTPATIENT
Start: 2024-02-22 | End: 2024-02-22 | Stop reason: HOSPADM

## 2024-02-22 RX ORDER — PROCHLORPERAZINE EDISYLATE 5 MG/ML
5 INJECTION INTRAMUSCULAR; INTRAVENOUS ONCE AS NEEDED
Status: DISCONTINUED | OUTPATIENT
Start: 2024-02-22 | End: 2024-02-22 | Stop reason: HOSPADM

## 2024-02-22 RX ORDER — EPINEPHRINE 0.3 MG/.3ML
0.3 INJECTION SUBCUTANEOUS ONCE AS NEEDED
Status: DISCONTINUED | OUTPATIENT
Start: 2024-02-22 | End: 2024-02-22 | Stop reason: HOSPADM

## 2024-02-22 RX ORDER — DIPHENHYDRAMINE HYDROCHLORIDE 50 MG/ML
50 INJECTION INTRAMUSCULAR; INTRAVENOUS ONCE AS NEEDED
Status: DISCONTINUED | OUTPATIENT
Start: 2024-02-22 | End: 2024-02-22 | Stop reason: HOSPADM

## 2024-02-22 RX ADMIN — ATROPINE SULFATE 0.4 MG: 0.4 INJECTION, SOLUTION INTRAVENOUS at 09:02

## 2024-02-22 RX ADMIN — BEVACIZUMAB-AWWB 300 MG: 100 INJECTION, SOLUTION INTRAVENOUS at 08:02

## 2024-02-22 RX ADMIN — FLUOROURACIL 4000 MG: 50 INJECTION, SOLUTION INTRAVENOUS at 11:02

## 2024-02-22 RX ADMIN — SODIUM CHLORIDE: 9 INJECTION, SOLUTION INTRAVENOUS at 08:02

## 2024-02-22 RX ADMIN — DEXAMETHASONE SODIUM PHOSPHATE 0.25 MG: 4 INJECTION, SOLUTION INTRA-ARTICULAR; INTRALESIONAL; INTRAMUSCULAR; INTRAVENOUS; SOFT TISSUE at 09:02

## 2024-02-22 RX ADMIN — IRINOTECAN HYDROCHLORIDE 300 MG: 20 INJECTION, SOLUTION INTRAVENOUS at 09:02

## 2024-02-22 NOTE — PLAN OF CARE
1148 Pt tolerated infusion well today, no complaints or complications. Connected to CADD pump for home infusion, connection sites secured, pump infusing properly at time of discharge. Pt aware to call provider with any questions or concerns, knows to return to clinic at approx 0930 for pump d/c on 2/24, verbalized understanding. Pt ambulatory from clinic with steady gait, no distress noted.

## 2024-02-22 NOTE — PLAN OF CARE
0800 Labs, hx, and medications reviewed, pt meets parameters for treatment today. Assessment completed and plan of care reviewed. Pt verbalized understanding. PAC accessed with no complications. Pt voices no new complaints or concerns, will continue to monitor for safety.

## 2024-02-24 ENCOUNTER — INFUSION (OUTPATIENT)
Dept: INFUSION THERAPY | Facility: HOSPITAL | Age: 74
End: 2024-02-24
Payer: MEDICARE

## 2024-02-24 VITALS
HEART RATE: 72 BPM | RESPIRATION RATE: 18 BRPM | OXYGEN SATURATION: 100 % | DIASTOLIC BLOOD PRESSURE: 72 MMHG | SYSTOLIC BLOOD PRESSURE: 122 MMHG

## 2024-02-24 DIAGNOSIS — C78.7 METASTATIC COLON CANCER TO LIVER: Primary | ICD-10-CM

## 2024-02-24 DIAGNOSIS — C18.9 METASTATIC COLON CANCER TO LIVER: Primary | ICD-10-CM

## 2024-02-24 PROCEDURE — A4216 STERILE WATER/SALINE, 10 ML: HCPCS | Performed by: PHYSICIAN ASSISTANT

## 2024-02-24 PROCEDURE — 25000003 PHARM REV CODE 250: Performed by: PHYSICIAN ASSISTANT

## 2024-02-24 PROCEDURE — 63600175 PHARM REV CODE 636 W HCPCS: Performed by: PHYSICIAN ASSISTANT

## 2024-02-24 RX ORDER — SODIUM CHLORIDE 0.9 % (FLUSH) 0.9 %
10 SYRINGE (ML) INJECTION
Status: DISCONTINUED | OUTPATIENT
Start: 2024-02-24 | End: 2024-02-24 | Stop reason: HOSPADM

## 2024-02-24 RX ORDER — HEPARIN 100 UNIT/ML
500 SYRINGE INTRAVENOUS
Status: DISCONTINUED | OUTPATIENT
Start: 2024-02-24 | End: 2024-02-24 | Stop reason: HOSPADM

## 2024-02-24 RX ADMIN — HEPARIN 500 UNITS: 100 SYRINGE at 09:02

## 2024-02-24 RX ADMIN — Medication 10 ML: at 09:02

## 2024-02-24 NOTE — PLAN OF CARE
0957 Pt tolerated home infusion well, no complaints or complications reported, VSS, vessel empty upon arrival. Pt disconnected from pump, positive blood return noted. Pt aware to call provider with any questions or concerns, aware of upcoming appts, ambulatory from clinic with steady gait, no distress noted.

## 2024-02-28 LAB
ONEOME COMMENT: NORMAL
ONEOME METHOD: NORMAL

## 2024-03-04 ENCOUNTER — OFFICE VISIT (OUTPATIENT)
Dept: HEMATOLOGY/ONCOLOGY | Facility: CLINIC | Age: 74
End: 2024-03-04
Payer: MEDICARE

## 2024-03-04 ENCOUNTER — LAB VISIT (OUTPATIENT)
Dept: LAB | Facility: HOSPITAL | Age: 74
End: 2024-03-04
Payer: MEDICARE

## 2024-03-04 VITALS
WEIGHT: 131.75 LBS | HEIGHT: 67 IN | HEART RATE: 78 BPM | OXYGEN SATURATION: 100 % | DIASTOLIC BLOOD PRESSURE: 74 MMHG | BODY MASS INDEX: 20.68 KG/M2 | SYSTOLIC BLOOD PRESSURE: 111 MMHG | TEMPERATURE: 98 F

## 2024-03-04 DIAGNOSIS — R63.4 WEIGHT DECREASE: ICD-10-CM

## 2024-03-04 DIAGNOSIS — N17.9 AKI (ACUTE KIDNEY INJURY): ICD-10-CM

## 2024-03-04 DIAGNOSIS — T45.1X5A ANEMIA ASSOCIATED WITH CHEMOTHERAPY: ICD-10-CM

## 2024-03-04 DIAGNOSIS — E43 SEVERE MALNUTRITION: ICD-10-CM

## 2024-03-04 DIAGNOSIS — D84.821 IMMUNODEFICIENCY DUE TO CHEMOTHERAPY: ICD-10-CM

## 2024-03-04 DIAGNOSIS — D84.81 IMMUNODEFICIENCY SECONDARY TO NEOPLASM: ICD-10-CM

## 2024-03-04 DIAGNOSIS — C18.9 METASTATIC COLON CANCER TO LIVER: ICD-10-CM

## 2024-03-04 DIAGNOSIS — C18.9 METASTATIC COLON CANCER TO LIVER: Primary | ICD-10-CM

## 2024-03-04 DIAGNOSIS — I10 HYPERTENSION, UNSPECIFIED TYPE: ICD-10-CM

## 2024-03-04 DIAGNOSIS — C78.7 METASTATIC COLON CANCER TO LIVER: ICD-10-CM

## 2024-03-04 DIAGNOSIS — C78.7 METASTATIC COLON CANCER TO LIVER: Primary | ICD-10-CM

## 2024-03-04 DIAGNOSIS — R52 PAIN: ICD-10-CM

## 2024-03-04 DIAGNOSIS — D49.9 IMMUNODEFICIENCY SECONDARY TO NEOPLASM: ICD-10-CM

## 2024-03-04 DIAGNOSIS — D64.81 ANEMIA ASSOCIATED WITH CHEMOTHERAPY: ICD-10-CM

## 2024-03-04 DIAGNOSIS — Z79.899 IMMUNODEFICIENCY DUE TO CHEMOTHERAPY: ICD-10-CM

## 2024-03-04 DIAGNOSIS — R39.9 LOWER URINARY TRACT SYMPTOMS (LUTS): ICD-10-CM

## 2024-03-04 DIAGNOSIS — T45.1X5A IMMUNODEFICIENCY DUE TO CHEMOTHERAPY: ICD-10-CM

## 2024-03-04 DIAGNOSIS — K59.03 DRUG-INDUCED CONSTIPATION: ICD-10-CM

## 2024-03-04 LAB
ALBUMIN SERPL BCP-MCNC: 3.1 G/DL (ref 3.5–5.2)
ALP SERPL-CCNC: 148 U/L (ref 55–135)
ALT SERPL W/O P-5'-P-CCNC: 19 U/L (ref 10–44)
ANION GAP SERPL CALC-SCNC: 8 MMOL/L (ref 8–16)
AST SERPL-CCNC: 28 U/L (ref 10–40)
BILIRUB SERPL-MCNC: 0.8 MG/DL (ref 0.1–1)
BUN SERPL-MCNC: 13 MG/DL (ref 8–23)
CALCIUM SERPL-MCNC: 9.5 MG/DL (ref 8.7–10.5)
CEA SERPL-MCNC: 3.3 NG/ML (ref 0–5)
CHLORIDE SERPL-SCNC: 107 MMOL/L (ref 95–110)
CO2 SERPL-SCNC: 26 MMOL/L (ref 23–29)
CREAT SERPL-MCNC: 1 MG/DL (ref 0.5–1.4)
ERYTHROCYTE [DISTWIDTH] IN BLOOD BY AUTOMATED COUNT: 17.8 % (ref 11.5–14.5)
EST. GFR  (NO RACE VARIABLE): >60 ML/MIN/1.73 M^2
GLUCOSE SERPL-MCNC: 96 MG/DL (ref 70–110)
HCT VFR BLD AUTO: 37.9 % (ref 40–54)
HGB BLD-MCNC: 11.4 G/DL (ref 14–18)
IMM GRANULOCYTES # BLD AUTO: 0.01 K/UL (ref 0–0.04)
MCH RBC QN AUTO: 25.9 PG (ref 27–31)
MCHC RBC AUTO-ENTMCNC: 30.1 G/DL (ref 32–36)
MCV RBC AUTO: 86 FL (ref 82–98)
NEUTROPHILS # BLD AUTO: 1.7 K/UL (ref 1.8–7.7)
PLATELET # BLD AUTO: 206 K/UL (ref 150–450)
PMV BLD AUTO: 10.7 FL (ref 9.2–12.9)
POTASSIUM SERPL-SCNC: 4.5 MMOL/L (ref 3.5–5.1)
PROT SERPL-MCNC: 7.1 G/DL (ref 6–8.4)
RBC # BLD AUTO: 4.41 M/UL (ref 4.6–6.2)
SODIUM SERPL-SCNC: 141 MMOL/L (ref 136–145)
WBC # BLD AUTO: 3.12 K/UL (ref 3.9–12.7)

## 2024-03-04 PROCEDURE — 3078F DIAST BP <80 MM HG: CPT | Mod: CPTII,S$GLB,, | Performed by: REGISTERED NURSE

## 2024-03-04 PROCEDURE — 36415 COLL VENOUS BLD VENIPUNCTURE: CPT | Performed by: REGISTERED NURSE

## 2024-03-04 PROCEDURE — 99999 PR PBB SHADOW E&M-EST. PATIENT-LVL III: CPT | Mod: PBBFAC,,, | Performed by: REGISTERED NURSE

## 2024-03-04 PROCEDURE — 1159F MED LIST DOCD IN RCRD: CPT | Mod: CPTII,S$GLB,, | Performed by: REGISTERED NURSE

## 2024-03-04 PROCEDURE — 1101F PT FALLS ASSESS-DOCD LE1/YR: CPT | Mod: CPTII,S$GLB,, | Performed by: REGISTERED NURSE

## 2024-03-04 PROCEDURE — 99215 OFFICE O/P EST HI 40 MIN: CPT | Mod: S$GLB,,, | Performed by: REGISTERED NURSE

## 2024-03-04 PROCEDURE — 80053 COMPREHEN METABOLIC PANEL: CPT | Performed by: REGISTERED NURSE

## 2024-03-04 PROCEDURE — 3008F BODY MASS INDEX DOCD: CPT | Mod: CPTII,S$GLB,, | Performed by: REGISTERED NURSE

## 2024-03-04 PROCEDURE — 1160F RVW MEDS BY RX/DR IN RCRD: CPT | Mod: CPTII,S$GLB,, | Performed by: REGISTERED NURSE

## 2024-03-04 PROCEDURE — 82378 CARCINOEMBRYONIC ANTIGEN: CPT | Performed by: REGISTERED NURSE

## 2024-03-04 PROCEDURE — 85027 COMPLETE CBC AUTOMATED: CPT | Performed by: REGISTERED NURSE

## 2024-03-04 PROCEDURE — 3074F SYST BP LT 130 MM HG: CPT | Mod: CPTII,S$GLB,, | Performed by: REGISTERED NURSE

## 2024-03-04 PROCEDURE — 1126F AMNT PAIN NOTED NONE PRSNT: CPT | Mod: CPTII,S$GLB,, | Performed by: REGISTERED NURSE

## 2024-03-04 PROCEDURE — 3288F FALL RISK ASSESSMENT DOCD: CPT | Mod: CPTII,S$GLB,, | Performed by: REGISTERED NURSE

## 2024-03-04 RX ORDER — DIPHENHYDRAMINE HYDROCHLORIDE 50 MG/ML
50 INJECTION INTRAMUSCULAR; INTRAVENOUS ONCE AS NEEDED
Status: CANCELLED | OUTPATIENT
Start: 2024-03-06

## 2024-03-04 RX ORDER — PROCHLORPERAZINE EDISYLATE 5 MG/ML
5 INJECTION INTRAMUSCULAR; INTRAVENOUS ONCE AS NEEDED
Status: CANCELLED
Start: 2024-03-06

## 2024-03-04 RX ORDER — ATROPINE SULFATE 0.4 MG/ML
0.4 INJECTION, SOLUTION ENDOTRACHEAL; INTRAMEDULLARY; INTRAMUSCULAR; INTRAVENOUS; SUBCUTANEOUS ONCE AS NEEDED
Status: CANCELLED | OUTPATIENT
Start: 2024-03-06

## 2024-03-04 RX ORDER — SODIUM CHLORIDE 0.9 % (FLUSH) 0.9 %
10 SYRINGE (ML) INJECTION
Status: CANCELLED | OUTPATIENT
Start: 2024-03-06

## 2024-03-04 RX ORDER — HEPARIN 100 UNIT/ML
500 SYRINGE INTRAVENOUS
Status: CANCELLED | OUTPATIENT
Start: 2024-03-06

## 2024-03-04 RX ORDER — EPINEPHRINE 0.3 MG/.3ML
0.3 INJECTION SUBCUTANEOUS ONCE AS NEEDED
Status: CANCELLED | OUTPATIENT
Start: 2024-03-06

## 2024-03-04 RX ORDER — HEPARIN 100 UNIT/ML
500 SYRINGE INTRAVENOUS
Status: CANCELLED | OUTPATIENT
Start: 2024-03-08

## 2024-03-04 RX ORDER — SODIUM CHLORIDE 0.9 % (FLUSH) 0.9 %
10 SYRINGE (ML) INJECTION
Status: CANCELLED | OUTPATIENT
Start: 2024-03-08

## 2024-03-04 NOTE — PROGRESS NOTES
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 Bonilla  12/30/2022: Y-90 to R liver  1/27/2023: Y-90 to R liver  5/17/2023: Y-90 to R liver  7/11/2023: C1 FOLFIRI + Pema  Restaging CT CAP 9/1/23: Good response to chemo.  9/7/23: C5 FOLFIRI + Pema  ......................................  Restaging CT CAP 10/12/23: Good response to chemo  10/16/23: C8 FOLFIRI + Pema  .......................................  Restaging CT CAP 12/8/23: Good response to chemo  12/11/23: C12 FOLFIRI + Pema  .......................................  Restaging CT CAP 2/5/24: Good response to chemo  2/8/24: C16 FOLFIRI + Pema    Interval History:   He presents today with his daughter prior to cycle 18 of FOLFIRI plus avastin. Doing well overall. Eating well and energy good. Rare nosebleeds that are quickly controlled. No worsening of scalp folliculitis today - states area has dried up since last visit. No other complaints or concerns.     ECOG status is 1. Presents with his daughter today.    Past Medical History:   Past Medical History:   Diagnosis Date    MARIA A (acute kidney injury) 8/18/2022    Hypertension     Metastatic colon cancer to liver 4/22/2021     Past Surgical HIstory:   Past Surgical History:   Procedure Laterality Date    COLONOSCOPY N/A 4/20/2021    Procedure: COLONOSCOPY with  possible stent;  Surgeon: EDILMA Bonilla MD;  Location: [facility] ENDO (2ND FLR);  Service: Colon and Rectal;  Laterality: N/A;    COLONOSCOPY N/A 11/3/2023    Procedure: COLONOSCOPY;  Surgeon: EDILMA Bonilla MD;  Location: [facility] ENDO (4TH FLR);  Service: Endoscopy;  Laterality: N/A;  prep instructions sent to pt via portal  From: EDILMA Bonilla MD  Procedure: Colonoscopy  Diagnosis: Surveillance colonoscopy - Hx of colon cancer  Procedure Timin-12 weeks  Provider: Myself  [place] [facility]  Additional Scheduling Information: No scheduling con    DIAGNOSTIC LAPAROSCOPY N/A 2022    Procedure: LAPAROSCOPY, DIAGNOSTIC;  Surgeon: Adrian Rodriguez MD;  Location: [facility] OR 2ND FLR;  Service: General;  Laterality: N/A;    INSERTION OF TUNNELED CENTRAL VENOUS CATHETER (CVC) WITH SUBCUTANEOUS PORT N/A 5/3/2021    Procedure: [id number];  Surgeon: Francisco Layton MD;  Location: [facility] OR 2ND FLR;  Service: Vascular;  Laterality: N/A;    OMENTECTOMY N/A 10/20/2022    Procedure: OMENTECTOMY;  Surgeon: EDILMA Bonilla MD;  Location: [facility] OR 2ND FLR;  Service: Colon and Rectal;  Laterality: N/A;    RIGHT HEMICOLECTOMY N/A 10/20/2022    Procedure: HEMICOLECTOMY, RIGHT, extended;  Surgeon: EDILMA Bonilla MD;  Location: [facility] OR 2ND FLR;  Service: Colon and Rectal;  Laterality: N/A;  Extended right hemicolectomy CONSENT IN AM     Family History:   Family History   Problem Relation Age of Onset    Cancer Brother        Social History:  reports that he has never smoked. He has never used smokeless tobacco. He reports that he does not currently use alcohol. He reports that he does not use drugs.    Allergies:  Review of patient's allergies indicates:  No Known Allergies    Medications:  Current Outpatient Medications   Medication Sig Dispense Refill    amLODIPine (NORVASC) 10 MG tablet Take 1 tablet (10 mg total) by mouth once daily. 90 tablet 3    ondansetron (ZOFRAN) 4 MG tablet Take 1 tablet (4 mg total) by  mouth every 8 (eight) hours as needed for Nausea. 30 tablet 3    acetaminophen (TYLENOL) 500 MG tablet Take 1 tablet (500 mg total) by mouth every 6 (six) hours as needed for Pain (alternate with ibuprofen). (Patient not taking: Reported on 12/26/2023)  0    ibuprofen (ADVIL,MOTRIN) 400 MG tablet Take 1 tablet (400 mg total) by mouth every 6 (six) hours as needed for Other (pain). Alternate with tylenol (Patient not taking: Reported on 12/26/2023)      oxyCODONE (ROXICODONE) 5 MG immediate release tablet Take 1 tablet (5 mg total) by mouth every 6 (six) hours as needed for Pain. (Patient not taking: Reported on 12/26/2023) 20 tablet 0    tamsulosin (FLOMAX) 0.4 mg Cap Take 1 capsule (0.4 mg total) by mouth once daily. (Patient not taking: Reported on 6/28/2023) 30 capsule 5     No current facility-administered medications for this visit.     Review of Systems   Constitutional:  Negative for activity change, appetite change, chills, diaphoresis, fatigue, fever and unexpected weight change.   HENT:  Negative for congestion, mouth sores, nosebleeds, postnasal drip, rhinorrhea, trouble swallowing and voice change.    Eyes:  Negative for pain and visual disturbance.   Respiratory:  Negative for cough, chest tightness, shortness of breath and wheezing.    Cardiovascular:  Negative for chest pain, palpitations and leg swelling.   Gastrointestinal:  Negative for abdominal distention, abdominal pain, blood in stool, constipation, diarrhea, nausea and vomiting.   Genitourinary:  Negative for difficulty urinating, dysuria, flank pain, frequency, hematuria and urgency.   Musculoskeletal:  Negative for arthralgias, back pain and myalgias.   Skin:  Negative for rash and wound.   Neurological:  Negative for dizziness, facial asymmetry, weakness, light-headedness, numbness and headaches.   Psychiatric/Behavioral:  Negative for agitation, behavioral problems, confusion, decreased concentration, dysphoric mood and sleep disturbance.  "The patient is not nervous/anxious.    All other systems reviewed and are negative.    ECOG Performance Status: 1     Objective:      Vitals:   Vitals:    03/04/24 0844   BP: 111/74   BP Location: Left arm   Patient Position: Sitting   BP Method: Medium (Automatic)   Pulse: 78   Temp: 97.7 °F (36.5 °C)   TempSrc: Oral   SpO2: 100%   Weight: 59.8 kg (131 lb 11.6 oz)   Height: 5' 7" (1.702 m)     Physical Exam  Vitals reviewed.   Constitutional:       General: He is not in acute distress.     Appearance: Normal appearance. He is not ill-appearing, toxic-appearing or diaphoretic.   HENT:      Head: Normocephalic and atraumatic.      Right Ear: External ear normal.      Left Ear: External ear normal.      Nose: Nose normal.      Mouth/Throat:      Pharynx: Oropharynx is clear.   Eyes:      General: No scleral icterus.     Extraocular Movements: Extraocular movements intact.      Conjunctiva/sclera: Conjunctivae normal.      Pupils: Pupils are equal, round, and reactive to light.   Cardiovascular:      Rate and Rhythm: Normal rate and regular rhythm.      Pulses: Normal pulses.      Heart sounds: Normal heart sounds. No murmur heard.  Pulmonary:      Effort: Pulmonary effort is normal. No respiratory distress.      Breath sounds: Normal breath sounds. No wheezing.   Chest:      Comments: Port to RCW, no signs of infection.  Abdominal:      General: Abdomen is flat. Bowel sounds are normal. There is no distension.      Palpations: Abdomen is soft. There is no mass.      Tenderness: There is no abdominal tenderness.   Musculoskeletal:         General: No swelling or deformity. Normal range of motion.      Right lower leg: No edema.      Left lower leg: No edema.   Skin:     Coloration: Skin is not jaundiced or pale.      Findings: No bruising, erythema or rash.   Neurological:      General: No focal deficit present.      Mental Status: He is alert and oriented to person, place, and time. Mental status is at baseline.      " Cranial Nerves: No cranial nerve deficit.      Sensory: No sensory deficit.      Gait: Gait normal.   Psychiatric:         Mood and Affect: Mood normal.         Behavior: Behavior normal.         Thought Content: Thought content normal.         Judgment: Judgment normal.     Laboratory Data:  Lab Visit on 03/04/2024   Component Date Value Ref Range Status    CEA 03/04/2024 3.3  0.0 - 5.0 ng/mL Final    Comment: CEA Normal Range:  Non-Smokers: 0-3.0 ng/mL  Smokers:     0-5.0 ng/mL    The testing method is a chemiluminescent microparticle immunoassay   manufactured by [employer] and performed on the Kamelio   or   [employer]partoo system. Values obtained with different assay manufacturers   for   methods may be different and cannot be used interchangeably.      Sodium 03/04/2024 141  136 - 145 mmol/L Final    Potassium 03/04/2024 4.5  3.5 - 5.1 mmol/L Final    Chloride 03/04/2024 107  95 - 110 mmol/L Final    CO2 03/04/2024 26  23 - 29 mmol/L Final    Glucose 03/04/2024 96  70 - 110 mg/dL Final    BUN 03/04/2024 13  8 - 23 mg/dL Final    Creatinine 03/04/2024 1.0  0.5 - 1.4 mg/dL Final    Calcium 03/04/2024 9.5  8.7 - 10.5 mg/dL Final    Total Protein 03/04/2024 7.1  6.0 - 8.4 g/dL Final    Albumin 03/04/2024 3.1 (L)  3.5 - 5.2 g/dL Final    Total Bilirubin 03/04/2024 0.8  0.1 - 1.0 mg/dL Final    Comment: For infants and newborns, interpretation of results should be based  on gestational age, weight and in agreement with clinical  observations.    Premature Infant recommended reference ranges:  Up to 24 hours.............<8.0 mg/dL  Up to 48 hours............<12.0 mg/dL  3-5 days..................<15.0 mg/dL  6-29 days.................<15.0 mg/dL      Alkaline Phosphatase 03/04/2024 148 (H)  55 - 135 U/L Final    AST 03/04/2024 28  10 - 40 U/L Final    ALT 03/04/2024 19  10 - 44 U/L Final    eGFR 03/04/2024 >60.0  >60 mL/min/1.73 m^2 Final    Anion Gap 03/04/2024 8  8 - 16 mmol/L Final    WBC 03/04/2024 3.12 (L)   3.90 - 12.70 K/uL Final    RBC 03/04/2024 4.41 (L)  4.60 - 6.20 M/uL Final    Hemoglobin 03/04/2024 11.4 (L)  14.0 - 18.0 g/dL Final    Hematocrit 03/04/2024 37.9 (L)  40.0 - 54.0 % Final    MCV 03/04/2024 86  82 - 98 fL Final    MCH 03/04/2024 25.9 (L)  27.0 - 31.0 pg Final    MCHC 03/04/2024 30.1 (L)  32.0 - 36.0 g/dL Final    RDW 03/04/2024 17.8 (H)  11.5 - 14.5 % Final    Platelets 03/04/2024 206  150 - 450 K/uL Final    MPV 03/04/2024 10.7  9.2 - 12.9 fL Final    Gran # (ANC) 03/04/2024 1.7 (L)  1.8 - 7.7 K/uL Final    Comment: The ANC is based on a white cell differential from an   automated cell counter. It has not been microscopically   reviewed for the presence of abnormal cells. Clinical   correlation is required.      Immature Grans (Abs) 03/04/2024 0.01  0.00 - 0.04 K/uL Final    Comment: Mild elevation in immature granulocytes is non specific and   can be seen in a variety of conditions including stress response,   acute inflammation, trauma and pregnancy. Correlation with other   laboratory and clinical findings is essential.       Assessment and Plan        1. Metastatic colon cancer to liver    2. Immunodeficiency secondary to neoplasm    3. Immunodeficiency due to chemotherapy    4. Hypertension, unspecified type    5. MARIA A (acute kidney injury)    6. Anemia associated with chemotherapy    7. Pain    8. Drug-induced constipation    9. Severe malnutrition    10. Weight decrease    11. Lower urinary tract symptoms (LUTS)      1-3.  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  Guardant 360 was negative for BRAF, VIRI, HER2 amplification.   Pretreatment CEA 57.  We had a long and sonia discussion with him about his diagnosis. Unfortunately, the disease is not curable but remains treatable and he has good performance status.   Restaging scans after 7 cycles of FOLFOX + Pema showed significant reduction in colonic mass and liver mass.   we switched to maintenance as of  cycle 8 with 5FU + Pema  Restaging scans from March 2022 show stable disease.   MRI on 7/18/22 showing hepatic progression of disease in the right hepatic lobe noted on the exam. CT CAP on 8/26 shows some mild progression in both liver metastases.    Discussed case at colorectal tumor board 8/31/22 and had a diagnostic lap performed by Dr. Rodriguez on 9/7 to assess for any occult peritoneal disease. Findings from the diagnostic lap were negative. Fluid from around from around the primary mass was sent for cytology that was negative for malignancy.   Underwent primary tumor resection on 10/20/22 with Dr. Bonilla.    Meanwhile his liver mets had grown.  We discussed his case at [facility] conference with plans for short term Y-90 and then restarting chemotherapy prior to considering any surgical options to his liver metastases.  He underwent Y-90 delivery on 12/30/22. Tolerated well.   CT CAP on 1/23/23 reviewed at [facility] conference with good response to Y-90, no new lesions.  Underwent second Y-90 on 1/27/23. Can consider R hepatectomy pending follow-up imaging after Y-90.     Received cycle 42 of FOLFOX (omitted oxaliplatin again starting cycle 41 due to neuropathy) on 2/9/23.  Because of 5-FU shortage nationally, we have been holding chemotherapy since mid-February 2023.  If he needs to restart chemotherapy (I.e. no plans for surgical resection), will place him on capecitabine maintenance for duration of 5-FU shortage.     Discussed at colorectal liver mets tumor board 3/16/23.  Plan for repeat Y-90 and then consideration of surgical resection and he will meet with liver transplant team as well.  Underwent Y-90 delivery 5/17/23 with IR.     Has been on maintenance Xeloda since late April 2023.    Met with liver transplant team but ultimately opted not to proceed with transplant as he was concerned about how he would tolerate a major surgery with the life changes that would come after transplant as well. They closed out his  case.    He met with Dr. Rodriguez to discuss whether surgical resection is indicated for his liver mets.  He recommended repeat MRI.  Repeat MRI unfortunately showed disease progression while on Xeloda maintenance.  Similarly his CEA garrett precipitously, all in keeping with worsening disease.    We recommended we restart IV chemotherapy with FOLFIRI + Avastin (never had irinotecan).    Because of hyperbilirubinemia he received cycle 1 with just 5-FU + Avastin on 7/11/23. Tolerated this well. Bilirubin has improved.  Received irinotecan starting with cycle 2.  Repeat CT CAP after cycle 4 shows good response to therapy.   Excellent tolerance. [facility] tumor board agrees with continuation of chemotherapy.   Repeat CT CAP after cycle 7 shows stable disease, no evidence of new or worsening disease.   Repeat CT CAP after cycle 11 shows stable disease.   Repeat CT CAP after cycle 15 shows improved disease.    Doing well today.   Labs reviewed, adequate for chemo.   Will proceed with cycle 18 today.   Repeat imaging after cycle 22.     -RTC in 2 weeks for next cycle with UA.     Tempus: APC, CKS1B cng, ERCC3 cnl, PIK3CA; KENIA, TMB 12.1.    4. Hypertension   -- BP well controlled today. Feels well.   -- Following with PCP.   -- encouraged him and his daughter to monitor BP and HR closely at home.     5. MARIA A  -- Resolved   -- Monitor. Encouraged continued hydration particularly with working outside.     6. Anemia  -- Hgb stable. Monitor.  -- No signs of bleeding. Platelets normal.     7-10. Neoplasm related pain, weight loss, constipation  -- Pain very well controlled. Has oxycodone 5mg to use PRN but not requiring now.  -- Following with nutrition. Encouraged increased protein. Monitor.  -- Continue Miralax daily for constipation.    11, Urinary Hesitancy.  -- Continue Flomax.  -- Reports improvement since starting medication.     Patient is in agreement with the proposed treatment plan. All questions were answered to the patient's  satisfaction. Pt knows to call clinic if anything is needed before the next clinic visit.    Patient discussed with collaborating physician, Dr. Schuster.    At least 40 minutes were spent today on this encounter including face to face time with the patient, data gathering/interpretation and documentation.       Natividad Maher, MSN, APRN, [facility]  Hematology and Medical Oncology  Clinical Nurse Specialist to Dr. Schuster, Dr. Quijano & Dr. Mueller    Route Chart for Scheduling    Med Onc Chart Routing      Follow up with physician 6 weeks. with labs for chemo. **please do not schedule any appointments prior to 8 AM. prefers appointments all in one day.   Follow up with RACHEL . 8 weeks with labs for cycle 22 chemo.   Infusion scheduling note   chemo every 2 weeks, pump d/c on day 3   Injection scheduling note    Labs CBC, CMP, CEA and urinalysis   Scheduling:  Preferred lab:  Lab interval: every 2 weeks     Imaging    Pharmacy appointment    Other referrals

## 2024-03-06 ENCOUNTER — INFUSION (OUTPATIENT)
Dept: INFUSION THERAPY | Facility: HOSPITAL | Age: 74
End: 2024-03-06
Payer: MEDICARE

## 2024-03-06 VITALS
BODY MASS INDEX: 20.82 KG/M2 | OXYGEN SATURATION: 100 % | RESPIRATION RATE: 18 BRPM | SYSTOLIC BLOOD PRESSURE: 118 MMHG | DIASTOLIC BLOOD PRESSURE: 65 MMHG | HEART RATE: 64 BPM | WEIGHT: 132.63 LBS | HEIGHT: 67 IN | TEMPERATURE: 98 F

## 2024-03-06 DIAGNOSIS — C78.7 METASTATIC COLON CANCER TO LIVER: Primary | ICD-10-CM

## 2024-03-06 DIAGNOSIS — C18.9 METASTATIC COLON CANCER TO LIVER: Primary | ICD-10-CM

## 2024-03-06 PROCEDURE — 96417 CHEMO IV INFUS EACH ADDL SEQ: CPT

## 2024-03-06 PROCEDURE — 96375 TX/PRO/DX INJ NEW DRUG ADDON: CPT

## 2024-03-06 PROCEDURE — 96367 TX/PROPH/DG ADDL SEQ IV INF: CPT

## 2024-03-06 PROCEDURE — 96416 CHEMO PROLONG INFUSE W/PUMP: CPT

## 2024-03-06 PROCEDURE — 63600175 PHARM REV CODE 636 W HCPCS: Performed by: REGISTERED NURSE

## 2024-03-06 PROCEDURE — 96413 CHEMO IV INFUSION 1 HR: CPT

## 2024-03-06 PROCEDURE — 25000003 PHARM REV CODE 250: Performed by: REGISTERED NURSE

## 2024-03-06 RX ORDER — PROCHLORPERAZINE EDISYLATE 5 MG/ML
5 INJECTION INTRAMUSCULAR; INTRAVENOUS ONCE AS NEEDED
Status: DISCONTINUED | OUTPATIENT
Start: 2024-03-06 | End: 2024-03-06 | Stop reason: HOSPADM

## 2024-03-06 RX ORDER — DIPHENHYDRAMINE HYDROCHLORIDE 50 MG/ML
50 INJECTION INTRAMUSCULAR; INTRAVENOUS ONCE AS NEEDED
Status: DISCONTINUED | OUTPATIENT
Start: 2024-03-06 | End: 2024-03-06 | Stop reason: HOSPADM

## 2024-03-06 RX ORDER — ATROPINE SULFATE 0.4 MG/ML
0.4 INJECTION, SOLUTION ENDOTRACHEAL; INTRAMEDULLARY; INTRAMUSCULAR; INTRAVENOUS; SUBCUTANEOUS ONCE AS NEEDED
Status: COMPLETED | OUTPATIENT
Start: 2024-03-06 | End: 2024-03-06

## 2024-03-06 RX ORDER — SODIUM CHLORIDE 0.9 % (FLUSH) 0.9 %
10 SYRINGE (ML) INJECTION
Status: DISCONTINUED | OUTPATIENT
Start: 2024-03-06 | End: 2024-03-06 | Stop reason: HOSPADM

## 2024-03-06 RX ORDER — EPINEPHRINE 0.3 MG/.3ML
0.3 INJECTION SUBCUTANEOUS ONCE AS NEEDED
Status: DISCONTINUED | OUTPATIENT
Start: 2024-03-06 | End: 2024-03-06 | Stop reason: HOSPADM

## 2024-03-06 RX ORDER — HEPARIN 100 UNIT/ML
500 SYRINGE INTRAVENOUS
Status: DISCONTINUED | OUTPATIENT
Start: 2024-03-06 | End: 2024-03-06 | Stop reason: HOSPADM

## 2024-03-06 RX ADMIN — SODIUM CHLORIDE: 9 INJECTION, SOLUTION INTRAVENOUS at 07:03

## 2024-03-06 RX ADMIN — FLUOROURACIL 4000 MG: 50 INJECTION, SOLUTION INTRAVENOUS at 11:03

## 2024-03-06 RX ADMIN — DEXAMETHASONE SODIUM PHOSPHATE 0.25 MG: 4 INJECTION, SOLUTION INTRA-ARTICULAR; INTRALESIONAL; INTRAMUSCULAR; INTRAVENOUS; SOFT TISSUE at 09:03

## 2024-03-06 RX ADMIN — BEVACIZUMAB-AWWB 300 MG: 100 INJECTION, SOLUTION INTRAVENOUS at 08:03

## 2024-03-06 RX ADMIN — ATROPINE SULFATE 0.4 MG: 0.4 INJECTION, SOLUTION INTRAVENOUS at 09:03

## 2024-03-06 RX ADMIN — IRINOTECAN HYDROCHLORIDE 300 MG: 20 INJECTION, SOLUTION INTRAVENOUS at 09:03

## 2024-03-06 NOTE — PLAN OF CARE
1110 Patient tolerated C18 MVASI/ Irinotecan/ 5FU without incident. Labs from 3/4 reviewed and within parameters for treatment. Vitals stable before, during, and after treatment. Port with brisk blood return and flushed without resistance before, during and after infusion. MVASI infused without incident. Pre-medicated per orders. Atropine IVP given per orders and patient request immediately prior to Irinotecan. Patient connected to 5FU home infusion pump at completion of infusion, port with blood return noted and flushed without resistance. Confirmed pump was infusing, visualized decrease in reservoir volume. Line connections secured. Patient verbalized understanding of home care of pump and aware of number to call on side of pump if issues arise. Patient aware of his pump d/c appointment on Friday at 0900. To contact provider with questions or concerns. D/C ambulatory and stable with his daughter.

## 2024-03-08 ENCOUNTER — INFUSION (OUTPATIENT)
Dept: INFUSION THERAPY | Facility: HOSPITAL | Age: 74
End: 2024-03-08
Payer: MEDICARE

## 2024-03-08 VITALS
TEMPERATURE: 98 F | HEART RATE: 70 BPM | SYSTOLIC BLOOD PRESSURE: 115 MMHG | RESPIRATION RATE: 18 BRPM | DIASTOLIC BLOOD PRESSURE: 70 MMHG

## 2024-03-08 DIAGNOSIS — C18.9 METASTATIC COLON CANCER TO LIVER: Primary | ICD-10-CM

## 2024-03-08 DIAGNOSIS — C78.7 METASTATIC COLON CANCER TO LIVER: Primary | ICD-10-CM

## 2024-03-08 PROCEDURE — A4216 STERILE WATER/SALINE, 10 ML: HCPCS | Performed by: REGISTERED NURSE

## 2024-03-08 PROCEDURE — 63600175 PHARM REV CODE 636 W HCPCS: Performed by: REGISTERED NURSE

## 2024-03-08 PROCEDURE — 25000003 PHARM REV CODE 250: Performed by: REGISTERED NURSE

## 2024-03-08 RX ORDER — HEPARIN 100 UNIT/ML
500 SYRINGE INTRAVENOUS
Status: DISCONTINUED | OUTPATIENT
Start: 2024-03-08 | End: 2024-03-08 | Stop reason: HOSPADM

## 2024-03-08 RX ORDER — SODIUM CHLORIDE 0.9 % (FLUSH) 0.9 %
10 SYRINGE (ML) INJECTION
Status: DISCONTINUED | OUTPATIENT
Start: 2024-03-08 | End: 2024-03-08 | Stop reason: HOSPADM

## 2024-03-08 RX ADMIN — Medication 10 ML: at 09:03

## 2024-03-08 RX ADMIN — HEPARIN 500 UNITS: 100 SYRINGE at 09:03

## 2024-03-14 ENCOUNTER — DOCUMENTATION ONLY (OUTPATIENT)
Dept: HEMATOLOGY/ONCOLOGY | Facility: CLINIC | Age: 74
End: 2024-03-14
Payer: MEDICARE

## 2024-03-14 ENCOUNTER — LAB VISIT (OUTPATIENT)
Dept: LAB | Facility: HOSPITAL | Age: 74
End: 2024-03-14
Payer: MEDICARE

## 2024-03-14 ENCOUNTER — OFFICE VISIT (OUTPATIENT)
Dept: HEMATOLOGY/ONCOLOGY | Facility: CLINIC | Age: 74
End: 2024-03-14
Payer: MEDICARE

## 2024-03-14 VITALS
HEART RATE: 69 BPM | BODY MASS INDEX: 20.88 KG/M2 | SYSTOLIC BLOOD PRESSURE: 131 MMHG | RESPIRATION RATE: 18 BRPM | HEIGHT: 67 IN | WEIGHT: 133.06 LBS | OXYGEN SATURATION: 100 % | TEMPERATURE: 98 F | DIASTOLIC BLOOD PRESSURE: 77 MMHG

## 2024-03-14 DIAGNOSIS — D84.821 IMMUNODEFICIENCY DUE TO CHEMOTHERAPY: ICD-10-CM

## 2024-03-14 DIAGNOSIS — T45.1X5A ANEMIA ASSOCIATED WITH CHEMOTHERAPY: ICD-10-CM

## 2024-03-14 DIAGNOSIS — I10 HYPERTENSION, UNSPECIFIED TYPE: ICD-10-CM

## 2024-03-14 DIAGNOSIS — C78.7 METASTATIC COLON CANCER TO LIVER: Primary | ICD-10-CM

## 2024-03-14 DIAGNOSIS — T45.1X5A IMMUNODEFICIENCY DUE TO CHEMOTHERAPY: ICD-10-CM

## 2024-03-14 DIAGNOSIS — D84.81 IMMUNODEFICIENCY SECONDARY TO NEOPLASM: ICD-10-CM

## 2024-03-14 DIAGNOSIS — C18.9 METASTATIC COLON CANCER TO LIVER: ICD-10-CM

## 2024-03-14 DIAGNOSIS — C18.9 METASTATIC COLON CANCER TO LIVER: Primary | ICD-10-CM

## 2024-03-14 DIAGNOSIS — D64.81 ANEMIA ASSOCIATED WITH CHEMOTHERAPY: ICD-10-CM

## 2024-03-14 DIAGNOSIS — E87.6 HYPOKALEMIA: ICD-10-CM

## 2024-03-14 DIAGNOSIS — D49.9 IMMUNODEFICIENCY SECONDARY TO NEOPLASM: ICD-10-CM

## 2024-03-14 DIAGNOSIS — C78.7 METASTATIC COLON CANCER TO LIVER: ICD-10-CM

## 2024-03-14 DIAGNOSIS — Z79.899 IMMUNODEFICIENCY DUE TO CHEMOTHERAPY: ICD-10-CM

## 2024-03-14 DIAGNOSIS — N17.9 AKI (ACUTE KIDNEY INJURY): ICD-10-CM

## 2024-03-14 LAB
ALBUMIN SERPL BCP-MCNC: 3 G/DL (ref 3.5–5.2)
ALP SERPL-CCNC: 141 U/L (ref 55–135)
ALT SERPL W/O P-5'-P-CCNC: 21 U/L (ref 10–44)
ANION GAP SERPL CALC-SCNC: 5 MMOL/L (ref 8–16)
AST SERPL-CCNC: 27 U/L (ref 10–40)
BILIRUB SERPL-MCNC: 0.7 MG/DL (ref 0.1–1)
BUN SERPL-MCNC: 11 MG/DL (ref 8–23)
CALCIUM SERPL-MCNC: 9 MG/DL (ref 8.7–10.5)
CEA SERPL-MCNC: 3.2 NG/ML (ref 0–5)
CHLORIDE SERPL-SCNC: 109 MMOL/L (ref 95–110)
CO2 SERPL-SCNC: 27 MMOL/L (ref 23–29)
CREAT SERPL-MCNC: 1.1 MG/DL (ref 0.5–1.4)
ERYTHROCYTE [DISTWIDTH] IN BLOOD BY AUTOMATED COUNT: 17.4 % (ref 11.5–14.5)
EST. GFR  (NO RACE VARIABLE): >60 ML/MIN/1.73 M^2
GLUCOSE SERPL-MCNC: 108 MG/DL (ref 70–110)
HCT VFR BLD AUTO: 34.5 % (ref 40–54)
HGB BLD-MCNC: 10.4 G/DL (ref 14–18)
IMM GRANULOCYTES # BLD AUTO: 0 K/UL (ref 0–0.04)
MCH RBC QN AUTO: 26.1 PG (ref 27–31)
MCHC RBC AUTO-ENTMCNC: 30.1 G/DL (ref 32–36)
MCV RBC AUTO: 87 FL (ref 82–98)
NEUTROPHILS # BLD AUTO: 0.5 K/UL (ref 1.8–7.7)
PLATELET # BLD AUTO: 192 K/UL (ref 150–450)
PMV BLD AUTO: 11 FL (ref 9.2–12.9)
POTASSIUM SERPL-SCNC: 4.2 MMOL/L (ref 3.5–5.1)
PROT SERPL-MCNC: 6.7 G/DL (ref 6–8.4)
RBC # BLD AUTO: 3.98 M/UL (ref 4.6–6.2)
SODIUM SERPL-SCNC: 141 MMOL/L (ref 136–145)
WBC # BLD AUTO: 1.8 K/UL (ref 3.9–12.7)

## 2024-03-14 PROCEDURE — 1160F RVW MEDS BY RX/DR IN RCRD: CPT | Mod: CPTII,S$GLB,, | Performed by: PHYSICIAN ASSISTANT

## 2024-03-14 PROCEDURE — 3075F SYST BP GE 130 - 139MM HG: CPT | Mod: CPTII,S$GLB,, | Performed by: PHYSICIAN ASSISTANT

## 2024-03-14 PROCEDURE — 3288F FALL RISK ASSESSMENT DOCD: CPT | Mod: CPTII,S$GLB,, | Performed by: PHYSICIAN ASSISTANT

## 2024-03-14 PROCEDURE — 1126F AMNT PAIN NOTED NONE PRSNT: CPT | Mod: CPTII,S$GLB,, | Performed by: PHYSICIAN ASSISTANT

## 2024-03-14 PROCEDURE — 3078F DIAST BP <80 MM HG: CPT | Mod: CPTII,S$GLB,, | Performed by: PHYSICIAN ASSISTANT

## 2024-03-14 PROCEDURE — 1101F PT FALLS ASSESS-DOCD LE1/YR: CPT | Mod: CPTII,S$GLB,, | Performed by: PHYSICIAN ASSISTANT

## 2024-03-14 PROCEDURE — 82378 CARCINOEMBRYONIC ANTIGEN: CPT | Performed by: REGISTERED NURSE

## 2024-03-14 PROCEDURE — 36415 COLL VENOUS BLD VENIPUNCTURE: CPT | Performed by: REGISTERED NURSE

## 2024-03-14 PROCEDURE — 99999 PR PBB SHADOW E&M-EST. PATIENT-LVL III: CPT | Mod: PBBFAC,,, | Performed by: PHYSICIAN ASSISTANT

## 2024-03-14 PROCEDURE — 1159F MED LIST DOCD IN RCRD: CPT | Mod: CPTII,S$GLB,, | Performed by: PHYSICIAN ASSISTANT

## 2024-03-14 PROCEDURE — 80053 COMPREHEN METABOLIC PANEL: CPT | Performed by: REGISTERED NURSE

## 2024-03-14 PROCEDURE — 99215 OFFICE O/P EST HI 40 MIN: CPT | Mod: S$GLB,,, | Performed by: PHYSICIAN ASSISTANT

## 2024-03-14 PROCEDURE — 85027 COMPLETE CBC AUTOMATED: CPT | Performed by: REGISTERED NURSE

## 2024-03-14 PROCEDURE — 3008F BODY MASS INDEX DOCD: CPT | Mod: CPTII,S$GLB,, | Performed by: PHYSICIAN ASSISTANT

## 2024-03-14 RX ORDER — ATROPINE SULFATE 0.4 MG/ML
0.4 INJECTION, SOLUTION ENDOTRACHEAL; INTRAMEDULLARY; INTRAMUSCULAR; INTRAVENOUS; SUBCUTANEOUS ONCE AS NEEDED
Status: CANCELLED | OUTPATIENT
Start: 2024-03-20

## 2024-03-14 RX ORDER — PROCHLORPERAZINE EDISYLATE 5 MG/ML
5 INJECTION INTRAMUSCULAR; INTRAVENOUS ONCE AS NEEDED
Status: CANCELLED
Start: 2024-03-20

## 2024-03-14 RX ORDER — SODIUM CHLORIDE 0.9 % (FLUSH) 0.9 %
10 SYRINGE (ML) INJECTION
Status: CANCELLED | OUTPATIENT
Start: 2024-03-20

## 2024-03-14 RX ORDER — EPINEPHRINE 0.3 MG/.3ML
0.3 INJECTION SUBCUTANEOUS ONCE AS NEEDED
Status: CANCELLED | OUTPATIENT
Start: 2024-03-20

## 2024-03-14 RX ORDER — DIPHENHYDRAMINE HYDROCHLORIDE 50 MG/ML
50 INJECTION INTRAMUSCULAR; INTRAVENOUS ONCE AS NEEDED
Status: CANCELLED | OUTPATIENT
Start: 2024-03-20

## 2024-03-14 RX ORDER — HEPARIN 100 UNIT/ML
500 SYRINGE INTRAVENOUS
Status: CANCELLED | OUTPATIENT
Start: 2024-03-20

## 2024-03-14 NOTE — PROGRESS NOTES
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 Bonilla  12/30/2022: Y-90 to R liver  1/27/2023: Y-90 to R liver  5/17/2023: Y-90 to R liver  7/11/2023: C1 FOLFIRI + Pema  7/26/2023: C2 FOLFIRI + Pema  8/8/2023: C3 FOLFIRI + Pema  8/22/23: C4 FOLFIRI + Pema  Restaging CT CAP 9/1/23: Good response to chemo.  9/7/23: C5 FOLFIRI + Pema  ......................................  Restaging CT CAP 10/12/23: Good response to chemo  10/16/23: C8 FOLFIRI + Pema  .......................................  Restaging CT CAP 12/8/23: Good response to chemo  12/11/23: C12 FOLFIRI + Pema  .......................................  Restaging CT CAP 2/5/24: Good response to chemo  2/8/24: C16 FOLFIRI + Pema    Interval History:   He presents today with his daughter prior to cycle 19 of FOLFIRI plus avastin. He is feeling well without any new complaints or concerns.  No bleeding.  Has some mild folliculitis on his scalp from time to time.  No other issues. Had a good holiday with his family. Eating well and has a good appetite.     ECOG status is 1. Presents with his daughter today.    Past Medical History:   Past Medical History:   Diagnosis Date    MARIA A (acute kidney injury) 8/18/2022    Hypertension     Metastatic colon cancer to liver 4/22/2021     Past Surgical HIstory:   Past  Surgical History:   Procedure Laterality Date    COLONOSCOPY N/A 2021    Procedure: COLONOSCOPY with possible stent;  Surgeon: EDILMA Bonilla MD;  Location: [facility] ENDO (2ND FLR);  Service: Colon and Rectal;  Laterality: N/A;    COLONOSCOPY N/A 11/3/2023    Procedure: COLONOSCOPY;  Surgeon: EDILMA Bonilla MD;  Location: [facility] ENDO (4TH FLR);  Service: Endoscopy;  Laterality: N/A;  prep instructions sent to pt via portal  From: EDILMA Bonilla MD  Procedure: Colonoscopy  Diagnosis: Surveillance colonoscopy - Hx of colon cancer  Procedure Timin-12 weeks  Provider: Myself  Location: [facility] 4Endo  Additional Scheduling Information: No scheduling con    DIAGNOSTIC LAPAROSCOPY N/A 2022    Procedure: LAPAROSCOPY, DIAGNOSTIC;  Surgeon: Adrian Rodriguez MD;  Location: [facility] OR 2ND FLR;  Service: General;  Laterality: N/A;    INSERTION OF TUNNELED CENTRAL VENOUS CATHETER (CVC) WITH SUBCUTANEOUS PORT N/A 5/3/2021    Procedure: [id number];  Surgeon: Francisco Layton MD;  Location: [facility] OR 2ND FLR;  Service: Vascular;  Laterality: N/A;    OMENTECTOMY N/A 10/20/2022    Procedure: OMENTECTOMY;  Surgeon: EDILMA Bonilla MD;  Location: [facility] OR 2ND FLR;  Service: Colon and Rectal;  Laterality: N/A;    RIGHT HEMICOLECTOMY N/A 10/20/2022    Procedure: HEMICOLECTOMY, RIGHT, extended;  Surgeon: EDILMA Bonilla MD;  [facility];  Service: Colon and Rectal;  Laterality: N/A;  Extended right hemicolectomy CONSENT IN AM     Family History:   Family History   Problem Relation Age of Onset    Cancer Brother        Social History:  reports that he has never smoked. He has never used smokeless tobacco. He reports that he does not currently use alcohol. He reports that he does not use drugs.    Allergies:  Review of patient's allergies indicates:  No Known Allergies    Medications:  Current Outpatient Medications   Medication Sig Dispense Refill    acetaminophen (TYLENOL) 500 MG tablet Take 1  tablet (500 mg total) by mouth every 6 (six) hours as needed for Pain (alternate with ibuprofen). (Patient not taking: Reported on 12/26/2023)  0    amLODIPine (NORVASC) 10 MG tablet Take 1 tablet (10 mg total) by mouth once daily. 90 tablet 3    ibuprofen (ADVIL,MOTRIN) 400 MG tablet Take 1 tablet (400 mg total) by mouth every 6 (six) hours as needed for Other (pain). Alternate with tylenol (Patient not taking: Reported on 12/26/2023)      ondansetron (ZOFRAN) 4 MG tablet Take 1 tablet (4 mg total) by mouth every 8 (eight) hours as needed for Nausea. 30 tablet 3    oxyCODONE (ROXICODONE) 5 MG immediate release tablet Take 1 tablet (5 mg total) by mouth every 6 (six) hours as needed for Pain. (Patient not taking: Reported on 12/26/2023) 20 tablet 0    tamsulosin (FLOMAX) 0.4 mg Cap Take 1 capsule (0.4 mg total) by mouth once daily. (Patient not taking: Reported on 6/28/2023) 30 capsule 5     No current facility-administered medications for this visit.     Review of Systems   Constitutional:  Negative for activity change, appetite change, chills, diaphoresis, fatigue, fever and unexpected weight change.   HENT:  Negative for congestion, mouth sores, nosebleeds, postnasal drip, rhinorrhea, trouble swallowing and voice change.    Eyes:  Negative for pain and visual disturbance.   Respiratory:  Negative for cough, chest tightness, shortness of breath and wheezing.    Cardiovascular:  Negative for chest pain, palpitations and leg swelling.   Gastrointestinal:  Negative for abdominal distention, abdominal pain, blood in stool, constipation, diarrhea, nausea and vomiting.   Genitourinary:  Negative for difficulty urinating, dysuria, flank pain, frequency, hematuria and urgency.   Musculoskeletal:  Negative for arthralgias, back pain and myalgias.   Skin:  Negative for rash and wound.   Neurological:  Negative for dizziness, facial asymmetry, weakness, light-headedness, numbness and headaches.   Psychiatric/Behavioral:  " Negative for agitation, behavioral problems, confusion, decreased concentration, dysphoric mood and sleep disturbance. The patient is not nervous/anxious.    All other systems reviewed and are negative.    ECOG Performance Status: 1     Objective:      Vitals:   Vitals:    03/14/24 1515   BP: 131/77   BP Location: Left arm   Patient Position: Sitting   BP Method: Large (Automatic)   Pulse: 69   Resp: 18   Temp: 98 °F (36.7 °C)   TempSrc: Oral   SpO2: 100%   Weight: 60.3 kg (133 lb 0.8 oz)   Height: 5' 7" (1.702 m)         Physical Exam  Vitals reviewed.   Constitutional:       General: He is not in acute distress.     Appearance: Normal appearance. He is not ill-appearing, toxic-appearing or diaphoretic.   HENT:      Head: Normocephalic and atraumatic.      Right Ear: External ear normal.      Left Ear: External ear normal.      Nose: Nose normal.      Mouth/Throat:      Pharynx: Oropharynx is clear.   Eyes:      General: No scleral icterus.     Extraocular Movements: Extraocular movements intact.      Conjunctiva/sclera: Conjunctivae normal.      Pupils: Pupils are equal, round, and reactive to light.   Cardiovascular:      Rate and Rhythm: Normal rate and regular rhythm.      Pulses: Normal pulses.      Heart sounds: Normal heart sounds. No murmur heard.  Pulmonary:      Effort: Pulmonary effort is normal. No respiratory distress.      Breath sounds: Normal breath sounds. No wheezing.   Chest:      Comments: Port to RCW, no signs of infection.  Abdominal:      General: Abdomen is flat. Bowel sounds are normal. There is no distension.      Palpations: Abdomen is soft. There is no mass.      Tenderness: There is no abdominal tenderness.      Comments: Vertical midline abdominal wound well healed   Musculoskeletal:         General: No swelling or deformity. Normal range of motion.      Right lower leg: No edema.      Left lower leg: No edema.   Skin:     Coloration: Skin is not jaundiced or pale.      Findings: No " bruising, erythema or rash.   Neurological:      General: No focal deficit present.      Mental Status: He is alert and oriented to person, place, and time. Mental status is at baseline.      Cranial Nerves: No cranial nerve deficit.      Sensory: No sensory deficit.      Gait: Gait normal.   Psychiatric:         Mood and Affect: Mood normal.         Behavior: Behavior normal.         Thought Content: Thought content normal.         Judgment: Judgment normal.   Laboratory Data:  Lab Visit on 03/14/2024   Component Date Value Ref Range Status    CEA 03/14/2024 3.2  0.0 - 5.0 ng/mL Final    Comment: CEA Normal Range:  Non-Smokers: 0-3.0 ng/mL  Smokers:     0-5.0 ng/mL    The testing method is a chemiluminescent microparticle immunoassay   manufactured by [employer] and performed on the [employer]   or   [employer]dsame system. Values obtained with different assay manufacturers   for   methods may be different and cannot be used interchangeably.      Sodium 03/14/2024 141  136 - 145 mmol/L Final    Potassium 03/14/2024 4.2  3.5 - 5.1 mmol/L Final    Chloride 03/14/2024 109  95 - 110 mmol/L Final    CO2 03/14/2024 27  23 - 29 mmol/L Final    Glucose 03/14/2024 108  70 - 110 mg/dL Final    BUN 03/14/2024 11  8 - 23 mg/dL Final    Creatinine 03/14/2024 1.1  0.5 - 1.4 mg/dL Final    Calcium 03/14/2024 9.0  8.7 - 10.5 mg/dL Final    Total Protein 03/14/2024 6.7  6.0 - 8.4 g/dL Final    Albumin 03/14/2024 3.0 (L)  3.5 - 5.2 g/dL Final    Total Bilirubin 03/14/2024 0.7  0.1 - 1.0 mg/dL Final    Comment: For infants and newborns, interpretation of results should be based  on gestational age, weight and in agreement with clinical  observations.    Premature Infant recommended reference ranges:  Up to 24 hours.............<8.0 mg/dL  Up to 48 hours............<12.0 mg/dL  3-5 days..................<15.0 mg/dL  6-29 days.................<15.0 mg/dL      Alkaline Phosphatase 03/14/2024 141 (H)  55 - 135 U/L Final     AST 03/14/2024 27  10 - 40 U/L Final    ALT 03/14/2024 21  10 - 44 U/L Final    eGFR 03/14/2024 >60.0  >60 mL/min/1.73 m^2 Final    Anion Gap 03/14/2024 5 (L)  8 - 16 mmol/L Final     Assessment and Plan        1. Metastatic colon cancer to liver    2. Immunodeficiency secondary to neoplasm    3. Immunodeficiency due to chemotherapy    4. Hypertension, unspecified type    5. MARIA A (acute kidney injury)    6. Anemia associated with chemotherapy    7. Hypokalemia            1-3.  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  Guardant 360 was negative for BRAF, VIRI, HER2 amplification.   Pretreatment CEA 57.  We had a long and sonia discussion with him about his diagnosis. Unfortunately, the disease is not curable but remains treatable and he has good performance status.   Restaging scans after 7 cycles of FOLFOX + Pema showed significant reduction in colonic mass and liver mass.   we switched to maintenance as of cycle 8 with 5FU + Pema  Restaging scans from March 2022 show stable disease.   MRI on 7/18/22 showing hepatic progression of disease in the right hepatic lobe noted on the exam. CT CAP on 8/26 shows some mild progression in both liver metastases.    Discussed case at colorectal tumor board 8/31/22 and had a diagnostic lap performed by Dr. Rodriguez on 9/7 to assess for any occult peritoneal disease. Findings from the diagnostic lap were negative. Fluid from around from around the primary mass was sent for cytology that was negative for malignancy.   Underwent primary tumor resection on 10/20/22 with Dr. Bonilla.    Meanwhile his liver mets had grown.  We discussed his case at [facility] conference with plans for short term Y-90 and then restarting chemotherapy prior to considering any surgical options to his liver metastases.  He underwent Y-90 delivery on 12/30/22. Tolerated well.   CT CAP on 1/23/23 reviewed at [facility] conference with good response to Y-90, no new lesions.   Underwent second Y-90 on 1/27/23. Can consider R hepatectomy pending follow-up imaging after Y-90.     Received cycle 42 of FOLFOX (omitted oxaliplatin again starting cycle 41 due to neuropathy) on 2/9/23.  Because of 5-FU shortage nationally, we have been holding chemotherapy since mid-February 2023.  If he needs to restart chemotherapy (I.e. no plans for surgical resection), will place him on capecitabine maintenance for duration of 5-FU shortage.     Discussed at colorectal liver mets tumor board 3/16/23.  Plan for repeat Y-90 and then consideration of surgical resection and he will meet with liver transplant team as well.  Underwent Y-90 delivery 5/17/23 with IR.     Has been on maintenance Xeloda since late April 2023.    Met with liver transplant team but ultimately opted not to proceed with transplant as he was concerned about how he would tolerate a major surgery with the life changes that would come after transplant as well. They closed out his case.    He met with Dr. Rodriguez to discuss whether surgical resection is indicated for his liver mets.  He recommended repeat MRI.  Repeat MRI unfortunately showed disease progression while on Xeloda maintenance.  Similarly his CEA garrett precipitously, all in keeping with worsening disease.    We recommended we restart IV chemotherapy with FOLFIRI + Avastin (never had irinotecan).    Because of hyperbilirubinemia he received cycle 1 with just 5-FU + Avastin on 7/11/23. Tolerated this well. Bilirubin has improved.  Received irinotecan starting with cycle 2.  Repeat CT CAP after cycle 4 shows good response to therapy.   Excellent tolerance. [facility] tumor board agrees with continuation of chemotherapy.   Repeat CT CAP after cycle 7 shows stable disease, no evidence of new or worsening disease.   Repeat CT CAP after cycle 11 shows stable disease.   Repeat CT CAP after cycle 15 shows improved disease.    Doing well today.   Labs reviewed. WBC 1.8. Will await results for ANC.  If low, will repeat CBC on Monday prior to chemotherapy.   Will proceed with cycle 19 on Monday.   Repeat imaging after cycle 20.     -RTC in 2 weeks for next cycle with UA.     Tempus: APC, CKS1B cng, ERCC3 cnl, PIK3CA; KENIA, TMB 12.1.    4. Hypertension   -- BP well controlled today. Feels well.   -- Following with PCP.   -- encouraged him and his daughter to monitor BP and HR closely at home.     5. MARIA A  -- Resolved   -- Monitor. Encouraged continued hydration particularly with working outside.     6. Anemia  -- Hgb stable. Monitor.  -- No signs of bleeding. Platelets normal.     7-10. Neoplasm related pain, weight loss, constipation  -- Pain very well controlled. Has oxycodone 5mg to use PRN but not requiring now.  -- Following with nutrition. Encouraged increased protein. Monitor.  -- Continue Miralax daily for constipation.    11, Urinary Hesitancy.  -- Continue Flomax.  -- Reports improvement since starting medication.     Patient is in agreement with the proposed treatment plan. All questions were answered to the patient's satisfaction. Pt knows to call clinic if anything is needed before the next clinic visit.      FAN Meier, PA-C  Physician Assistant Certified  Dept of Hematology/Oncology  PA-C to Dr. Mueller, Dr. Schuster and Dr. Castellon     Route Chart for Scheduling    Med Onc Chart Routing      Follow up with physician . See RACHEL and Dr Schuster as scheduled with lab work and chemotherapy.   Follow up with RACHEL    Infusion scheduling note    Injection scheduling note    Labs CBC, CMP, CEA and urinalysis   Scheduling:  Preferred lab:  Lab interval: every 2 weeks     Imaging CT chest abdomen pelvis   Please schedule CT CAP in 6 weeks prior to clinic visit with Dr Schuster   Pharmacy appointment    Other referrals

## 2024-03-18 ENCOUNTER — INFUSION (OUTPATIENT)
Dept: INFUSION THERAPY | Facility: HOSPITAL | Age: 74
End: 2024-03-18
Payer: MEDICARE

## 2024-03-18 VITALS
HEART RATE: 83 BPM | WEIGHT: 135.13 LBS | RESPIRATION RATE: 18 BRPM | DIASTOLIC BLOOD PRESSURE: 82 MMHG | SYSTOLIC BLOOD PRESSURE: 125 MMHG | OXYGEN SATURATION: 100 % | BODY MASS INDEX: 21.17 KG/M2 | TEMPERATURE: 98 F

## 2024-03-18 DIAGNOSIS — C18.9 METASTATIC COLON CANCER TO LIVER: Primary | ICD-10-CM

## 2024-03-18 DIAGNOSIS — C78.7 METASTATIC COLON CANCER TO LIVER: Primary | ICD-10-CM

## 2024-03-18 PROCEDURE — 25000003 PHARM REV CODE 250: Performed by: PHYSICIAN ASSISTANT

## 2024-03-18 PROCEDURE — 96375 TX/PRO/DX INJ NEW DRUG ADDON: CPT

## 2024-03-18 PROCEDURE — 96417 CHEMO IV INFUS EACH ADDL SEQ: CPT

## 2024-03-18 PROCEDURE — 96367 TX/PROPH/DG ADDL SEQ IV INF: CPT

## 2024-03-18 PROCEDURE — 63600175 PHARM REV CODE 636 W HCPCS: Performed by: PHYSICIAN ASSISTANT

## 2024-03-18 PROCEDURE — 96413 CHEMO IV INFUSION 1 HR: CPT

## 2024-03-18 PROCEDURE — 96416 CHEMO PROLONG INFUSE W/PUMP: CPT

## 2024-03-18 RX ORDER — EPINEPHRINE 0.3 MG/.3ML
0.3 INJECTION SUBCUTANEOUS ONCE AS NEEDED
Status: DISCONTINUED | OUTPATIENT
Start: 2024-03-18 | End: 2024-03-18 | Stop reason: HOSPADM

## 2024-03-18 RX ORDER — SODIUM CHLORIDE 0.9 % (FLUSH) 0.9 %
10 SYRINGE (ML) INJECTION
Status: DISCONTINUED | OUTPATIENT
Start: 2024-03-18 | End: 2024-03-18 | Stop reason: HOSPADM

## 2024-03-18 RX ORDER — ATROPINE SULFATE 0.4 MG/ML
0.4 INJECTION, SOLUTION ENDOTRACHEAL; INTRAMEDULLARY; INTRAMUSCULAR; INTRAVENOUS; SUBCUTANEOUS ONCE AS NEEDED
Status: COMPLETED | OUTPATIENT
Start: 2024-03-18 | End: 2024-03-18

## 2024-03-18 RX ORDER — DIPHENHYDRAMINE HYDROCHLORIDE 50 MG/ML
50 INJECTION INTRAMUSCULAR; INTRAVENOUS ONCE AS NEEDED
Status: DISCONTINUED | OUTPATIENT
Start: 2024-03-18 | End: 2024-03-18 | Stop reason: HOSPADM

## 2024-03-18 RX ORDER — PROCHLORPERAZINE EDISYLATE 5 MG/ML
5 INJECTION INTRAMUSCULAR; INTRAVENOUS ONCE AS NEEDED
Status: DISCONTINUED | OUTPATIENT
Start: 2024-03-18 | End: 2024-03-18 | Stop reason: HOSPADM

## 2024-03-18 RX ORDER — HEPARIN 100 UNIT/ML
500 SYRINGE INTRAVENOUS
Status: DISCONTINUED | OUTPATIENT
Start: 2024-03-18 | End: 2024-03-18 | Stop reason: HOSPADM

## 2024-03-18 RX ADMIN — BEVACIZUMAB-AWWB 300 MG: 100 INJECTION, SOLUTION INTRAVENOUS at 02:03

## 2024-03-18 RX ADMIN — IRINOTECAN HYDROCHLORIDE 300 MG: 20 INJECTION, SOLUTION INTRAVENOUS at 03:03

## 2024-03-18 RX ADMIN — SODIUM CHLORIDE: 0.9 INJECTION, SOLUTION INTRAVENOUS at 01:03

## 2024-03-18 RX ADMIN — ATROPINE SULFATE 0.4 MG: 0.4 INJECTION, SOLUTION INTRAVENOUS at 03:03

## 2024-03-18 RX ADMIN — DEXAMETHASONE SODIUM PHOSPHATE 0.25 MG: 4 INJECTION, SOLUTION INTRA-ARTICULAR; INTRALESIONAL; INTRAMUSCULAR; INTRAVENOUS; SOFT TISSUE at 03:03

## 2024-03-18 RX ADMIN — FLUOROURACIL 4000 MG: 50 INJECTION, SOLUTION INTRAVENOUS at 05:03

## 2024-03-18 NOTE — PLAN OF CARE
Problem: Anemia (Chemotherapy Effects)  Goal: Anemia Symptom Improvement  Outcome: Ongoing, Progressing     Problem: Urinary Bleeding Risk or Actual (Chemotherapy Effects)  Goal: Absence of Hematuria  Outcome: Ongoing, Progressing     Problem: Neurotoxicity (Chemotherapy Effects)  Goal: Neurotoxicity Symptom Control  Outcome: Ongoing, Progressing     Problem: Neutropenia (Chemotherapy Effects)  Goal: Absence of Infection  Outcome: Ongoing, Progressing     Problem: Oral Mucositis (Chemotherapy Effects)  Goal: Improved Oral Mucous Membrane Integrity  Outcome: Ongoing, Progressing     Problem: Thrombocytopenia Bleeding Risk (Chemotherapy Effects)  Goal: Absence of Bleeding  Outcome: Ongoing, Progressing     Ambulatory to clinic with no c/o adverse effects or s/s of infection.  MVASI, premeds and irinotecan tolerated with no problems.  5FU attached via CADD pump, screen read RUN and volume decreased.  RTC, Wednesday, 3/20/2024 at 3 pm for pump dc.  Ambulatory home with family.  NAD noted

## 2024-03-20 ENCOUNTER — INFUSION (OUTPATIENT)
Dept: INFUSION THERAPY | Facility: HOSPITAL | Age: 74
End: 2024-03-20
Payer: MEDICARE

## 2024-03-20 VITALS
SYSTOLIC BLOOD PRESSURE: 122 MMHG | RESPIRATION RATE: 18 BRPM | DIASTOLIC BLOOD PRESSURE: 68 MMHG | TEMPERATURE: 98 F | OXYGEN SATURATION: 100 % | HEART RATE: 72 BPM

## 2024-03-20 DIAGNOSIS — C18.9 METASTATIC COLON CANCER TO LIVER: Primary | ICD-10-CM

## 2024-03-20 DIAGNOSIS — C78.7 METASTATIC COLON CANCER TO LIVER: Primary | ICD-10-CM

## 2024-03-20 PROCEDURE — 96523 IRRIG DRUG DELIVERY DEVICE: CPT

## 2024-03-20 PROCEDURE — 63600175 PHARM REV CODE 636 W HCPCS: Performed by: INTERNAL MEDICINE

## 2024-03-20 PROCEDURE — 25000003 PHARM REV CODE 250: Performed by: INTERNAL MEDICINE

## 2024-03-20 PROCEDURE — A4216 STERILE WATER/SALINE, 10 ML: HCPCS | Performed by: INTERNAL MEDICINE

## 2024-03-20 RX ORDER — HEPARIN 100 UNIT/ML
500 SYRINGE INTRAVENOUS
Status: DISCONTINUED | OUTPATIENT
Start: 2024-03-20 | End: 2024-03-20 | Stop reason: HOSPADM

## 2024-03-20 RX ORDER — SODIUM CHLORIDE 0.9 % (FLUSH) 0.9 %
10 SYRINGE (ML) INJECTION
Status: DISCONTINUED | OUTPATIENT
Start: 2024-03-20 | End: 2024-03-20 | Stop reason: HOSPADM

## 2024-03-20 RX ADMIN — SODIUM CHLORIDE, PRESERVATIVE FREE 10 ML: 5 INJECTION INTRAVENOUS at 02:03

## 2024-03-20 RX ADMIN — HEPARIN 500 UNITS: 100 SYRINGE at 02:03

## 2024-03-28 ENCOUNTER — LAB VISIT (OUTPATIENT)
Dept: LAB | Facility: HOSPITAL | Age: 74
End: 2024-03-28
Payer: MEDICARE

## 2024-03-28 ENCOUNTER — OFFICE VISIT (OUTPATIENT)
Dept: HEMATOLOGY/ONCOLOGY | Facility: CLINIC | Age: 74
End: 2024-03-28
Payer: MEDICARE

## 2024-03-28 VITALS
SYSTOLIC BLOOD PRESSURE: 115 MMHG | HEIGHT: 67 IN | HEART RATE: 70 BPM | BODY MASS INDEX: 20.93 KG/M2 | TEMPERATURE: 98 F | WEIGHT: 133.38 LBS | OXYGEN SATURATION: 100 % | DIASTOLIC BLOOD PRESSURE: 69 MMHG

## 2024-03-28 DIAGNOSIS — D84.81 IMMUNODEFICIENCY SECONDARY TO NEOPLASM: ICD-10-CM

## 2024-03-28 DIAGNOSIS — D64.81 ANEMIA ASSOCIATED WITH CHEMOTHERAPY: ICD-10-CM

## 2024-03-28 DIAGNOSIS — C18.9 METASTATIC COLON CANCER TO LIVER: ICD-10-CM

## 2024-03-28 DIAGNOSIS — R39.9 LOWER URINARY TRACT SYMPTOMS (LUTS): ICD-10-CM

## 2024-03-28 DIAGNOSIS — C78.7 METASTATIC COLON CANCER TO LIVER: ICD-10-CM

## 2024-03-28 DIAGNOSIS — T45.1X5A CHEMOTHERAPY INDUCED NEUTROPENIA: ICD-10-CM

## 2024-03-28 DIAGNOSIS — Z79.899 IMMUNODEFICIENCY DUE TO CHEMOTHERAPY: ICD-10-CM

## 2024-03-28 DIAGNOSIS — C18.9 METASTATIC COLON CANCER TO LIVER: Primary | ICD-10-CM

## 2024-03-28 DIAGNOSIS — R52 PAIN: ICD-10-CM

## 2024-03-28 DIAGNOSIS — D49.9 IMMUNODEFICIENCY SECONDARY TO NEOPLASM: ICD-10-CM

## 2024-03-28 DIAGNOSIS — D70.1 CHEMOTHERAPY INDUCED NEUTROPENIA: ICD-10-CM

## 2024-03-28 DIAGNOSIS — T45.1X5A IMMUNODEFICIENCY DUE TO CHEMOTHERAPY: ICD-10-CM

## 2024-03-28 DIAGNOSIS — T45.1X5A ANEMIA ASSOCIATED WITH CHEMOTHERAPY: ICD-10-CM

## 2024-03-28 DIAGNOSIS — I10 HYPERTENSION, UNSPECIFIED TYPE: ICD-10-CM

## 2024-03-28 DIAGNOSIS — K59.03 DRUG-INDUCED CONSTIPATION: ICD-10-CM

## 2024-03-28 DIAGNOSIS — C78.7 METASTATIC COLON CANCER TO LIVER: Primary | ICD-10-CM

## 2024-03-28 DIAGNOSIS — R63.4 WEIGHT DECREASE: ICD-10-CM

## 2024-03-28 DIAGNOSIS — E43 SEVERE MALNUTRITION: ICD-10-CM

## 2024-03-28 DIAGNOSIS — D84.821 IMMUNODEFICIENCY DUE TO CHEMOTHERAPY: ICD-10-CM

## 2024-03-28 DIAGNOSIS — N17.9 AKI (ACUTE KIDNEY INJURY): ICD-10-CM

## 2024-03-28 LAB
ALBUMIN SERPL BCP-MCNC: 3.3 G/DL (ref 3.5–5.2)
ALP SERPL-CCNC: 130 U/L (ref 55–135)
ALT SERPL W/O P-5'-P-CCNC: 20 U/L (ref 10–44)
ANION GAP SERPL CALC-SCNC: 5 MMOL/L (ref 8–16)
AST SERPL-CCNC: 28 U/L (ref 10–40)
BASOPHILS # BLD AUTO: 0.02 K/UL (ref 0–0.2)
BASOPHILS NFR BLD: 0.8 % (ref 0–1.9)
BILIRUB SERPL-MCNC: 1.2 MG/DL (ref 0.1–1)
BUN SERPL-MCNC: 16 MG/DL (ref 8–23)
CALCIUM SERPL-MCNC: 9 MG/DL (ref 8.7–10.5)
CEA SERPL-MCNC: 2.2 NG/ML (ref 0–5)
CHLORIDE SERPL-SCNC: 111 MMOL/L (ref 95–110)
CO2 SERPL-SCNC: 25 MMOL/L (ref 23–29)
CREAT SERPL-MCNC: 1.1 MG/DL (ref 0.5–1.4)
DIFFERENTIAL METHOD BLD: ABNORMAL
EOSINOPHIL # BLD AUTO: 0 K/UL (ref 0–0.5)
EOSINOPHIL NFR BLD: 0.8 % (ref 0–8)
ERYTHROCYTE [DISTWIDTH] IN BLOOD BY AUTOMATED COUNT: 18.1 % (ref 11.5–14.5)
EST. GFR  (NO RACE VARIABLE): >60 ML/MIN/1.73 M^2
GLUCOSE SERPL-MCNC: 87 MG/DL (ref 70–110)
HCT VFR BLD AUTO: 31.3 % (ref 40–54)
HGB BLD-MCNC: 9.3 G/DL (ref 14–18)
IMM GRANULOCYTES # BLD AUTO: 0 K/UL (ref 0–0.04)
IMM GRANULOCYTES NFR BLD AUTO: 0 % (ref 0–0.5)
LYMPHOCYTES # BLD AUTO: 0.8 K/UL (ref 1–4.8)
LYMPHOCYTES NFR BLD: 30 % (ref 18–48)
MCH RBC QN AUTO: 25.5 PG (ref 27–31)
MCHC RBC AUTO-ENTMCNC: 29.7 G/DL (ref 32–36)
MCV RBC AUTO: 86 FL (ref 82–98)
MONOCYTES # BLD AUTO: 0.6 K/UL (ref 0.3–1)
MONOCYTES NFR BLD: 23.1 % (ref 4–15)
NEUTROPHILS # BLD AUTO: 1.2 K/UL (ref 1.8–7.7)
NEUTROPHILS NFR BLD: 45.3 % (ref 38–73)
NRBC BLD-RTO: 0 /100 WBC
PLATELET # BLD AUTO: 196 K/UL (ref 150–450)
PMV BLD AUTO: 10.8 FL (ref 9.2–12.9)
POTASSIUM SERPL-SCNC: 5.4 MMOL/L (ref 3.5–5.1)
PROT SERPL-MCNC: 6.4 G/DL (ref 6–8.4)
RBC # BLD AUTO: 3.65 M/UL (ref 4.6–6.2)
SODIUM SERPL-SCNC: 141 MMOL/L (ref 136–145)
WBC # BLD AUTO: 2.6 K/UL (ref 3.9–12.7)

## 2024-03-28 PROCEDURE — 99215 OFFICE O/P EST HI 40 MIN: CPT | Mod: S$GLB,,, | Performed by: REGISTERED NURSE

## 2024-03-28 PROCEDURE — 99999 PR PBB SHADOW E&M-EST. PATIENT-LVL III: CPT | Mod: PBBFAC,,, | Performed by: REGISTERED NURSE

## 2024-03-28 PROCEDURE — 3078F DIAST BP <80 MM HG: CPT | Mod: CPTII,S$GLB,, | Performed by: REGISTERED NURSE

## 2024-03-28 PROCEDURE — 3074F SYST BP LT 130 MM HG: CPT | Mod: CPTII,S$GLB,, | Performed by: REGISTERED NURSE

## 2024-03-28 PROCEDURE — 85025 COMPLETE CBC W/AUTO DIFF WBC: CPT | Performed by: PHYSICIAN ASSISTANT

## 2024-03-28 PROCEDURE — 1159F MED LIST DOCD IN RCRD: CPT | Mod: CPTII,S$GLB,, | Performed by: REGISTERED NURSE

## 2024-03-28 PROCEDURE — 3008F BODY MASS INDEX DOCD: CPT | Mod: CPTII,S$GLB,, | Performed by: REGISTERED NURSE

## 2024-03-28 PROCEDURE — 1160F RVW MEDS BY RX/DR IN RCRD: CPT | Mod: CPTII,S$GLB,, | Performed by: REGISTERED NURSE

## 2024-03-28 PROCEDURE — 36415 COLL VENOUS BLD VENIPUNCTURE: CPT | Performed by: PHYSICIAN ASSISTANT

## 2024-03-28 PROCEDURE — 1126F AMNT PAIN NOTED NONE PRSNT: CPT | Mod: CPTII,S$GLB,, | Performed by: REGISTERED NURSE

## 2024-03-28 PROCEDURE — 80053 COMPREHEN METABOLIC PANEL: CPT | Performed by: PHYSICIAN ASSISTANT

## 2024-03-28 PROCEDURE — 1101F PT FALLS ASSESS-DOCD LE1/YR: CPT | Mod: CPTII,S$GLB,, | Performed by: REGISTERED NURSE

## 2024-03-28 PROCEDURE — 3288F FALL RISK ASSESSMENT DOCD: CPT | Mod: CPTII,S$GLB,, | Performed by: REGISTERED NURSE

## 2024-03-28 PROCEDURE — 82378 CARCINOEMBRYONIC ANTIGEN: CPT | Performed by: PHYSICIAN ASSISTANT

## 2024-03-28 RX ORDER — HEPARIN 100 UNIT/ML
500 SYRINGE INTRAVENOUS
Status: CANCELLED | OUTPATIENT
Start: 2024-04-03

## 2024-03-28 RX ORDER — SODIUM CHLORIDE 0.9 % (FLUSH) 0.9 %
10 SYRINGE (ML) INJECTION
Status: CANCELLED | OUTPATIENT
Start: 2024-04-03

## 2024-03-28 RX ORDER — SODIUM CHLORIDE 0.9 % (FLUSH) 0.9 %
10 SYRINGE (ML) INJECTION
Status: CANCELLED | OUTPATIENT
Start: 2024-04-05

## 2024-03-28 RX ORDER — HEPARIN 100 UNIT/ML
500 SYRINGE INTRAVENOUS
Status: CANCELLED | OUTPATIENT
Start: 2024-04-05

## 2024-03-28 RX ORDER — PROCHLORPERAZINE EDISYLATE 5 MG/ML
5 INJECTION INTRAMUSCULAR; INTRAVENOUS ONCE AS NEEDED
Status: CANCELLED
Start: 2024-04-03

## 2024-03-28 RX ORDER — DIPHENHYDRAMINE HYDROCHLORIDE 50 MG/ML
50 INJECTION INTRAMUSCULAR; INTRAVENOUS ONCE AS NEEDED
Status: CANCELLED | OUTPATIENT
Start: 2024-04-03

## 2024-03-28 RX ORDER — EPINEPHRINE 0.3 MG/.3ML
0.3 INJECTION SUBCUTANEOUS ONCE AS NEEDED
Status: CANCELLED | OUTPATIENT
Start: 2024-04-03

## 2024-03-28 RX ORDER — ATROPINE SULFATE 0.4 MG/ML
0.4 INJECTION, SOLUTION ENDOTRACHEAL; INTRAMEDULLARY; INTRAMUSCULAR; INTRAVENOUS; SUBCUTANEOUS ONCE AS NEEDED
Status: CANCELLED | OUTPATIENT
Start: 2024-04-03

## 2024-03-28 NOTE — PROGRESS NOTES
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 Bonilla  12/30/2022: Y-90 to R liver  1/27/2023: Y-90 to R liver  5/17/2023: Y-90 to R liver  7/11/2023: C1 FOLFIRI + Pema  7/26/2023: C2 FOLFIRI + Pema  8/8/2023: C3 FOLFIRI + Pema  8/22/23: C4 FOLFIRI + Pema  Restaging CT CAP 9/1/23: Good response to chemo.  9/7/23: C5 FOLFIRI + Pema  ......................................  Restaging CT CAP 10/12/23: Good response to chemo  10/16/23: C8 FOLFIRI + Pema  .......................................  Restaging CT CAP 12/8/23: Good response to chemo  12/11/23: C12 FOLFIRI + Pema  .......................................  Restaging CT CAP 2/5/24: Good response to chemo  2/8/24: C16 FOLFIRI + Pema    Interval History:   He presents today with his daughter prior to cycle 20 of FOLFIRI plus avastin. He is feeling well overall. No folliculitis concerns at this time. Eating well and energy stable. Mild increase in nausea with last cycle. Bowels stable. No bleeding concerns other than rare mild epistaxis, which is easily controlled.     ECOG status is 1. Presents with his daughter today.    Past Medical History:   Past Medical History:   Diagnosis Date    MARIA A (acute kidney injury) 8/18/2022    Hypertension     Metastatic colon cancer to liver 4/22/2021     Past Surgical HIstory:   Past  Surgical History:   Procedure Laterality Date    COLONOSCOPY N/A 2021    Procedure: COLONOSCOPY with possible stent;  Surgeon: EDILMA Bonilla MD;  Location: [facility] ENDO (2ND FLR);  Service: Colon and Rectal;  Laterality: N/A;    COLONOSCOPY N/A 11/3/2023    Procedure: COLONOSCOPY;  Surgeon: EDILMA Bonilla MD;  Location: [facility] ENDO (4TH FLR);  Service: Endoscopy;  Laterality: N/A;  prep instructions sent to pt via portal  From: EDILMA Bonilla MD  Procedure: Colonoscopy  Diagnosis: Surveillance colonoscopy - Hx of colon cancer  Procedure Timin-12 weeks  Provider: Myself  Location: [facility]  Additional Scheduling Information: No scheduling con    DIAGNOSTIC LAPAROSCOPY N/A 2022    Procedure: LAPAROSCOPY, DIAGNOSTIC;  Surgeon: Adrian Rodriguez MD;  Location: [facility] OR 2ND FLR;  Service: General;  Laterality: N/A;    INSERTION OF TUNNELED CENTRAL VENOUS CATHETER (CVC) WITH SUBCUTANEOUS PORT N/A 5/3/2021    Procedure: [id number]-[id number];  Surgeon: Francisco Layton MD;  Location: [facility] OR 2ND FLR;  Service: Vascular;  Laterality: N/A;    OMENTECTOMY N/A 10/20/2022    Procedure: OMENTECTOMY;  Surgeon: EDILMA Bonilla MD;  Location: [facility] OR 2ND FLR;  Service: Colon and Rectal;  Laterality: N/A;    RIGHT HEMICOLECTOMY N/A 10/20/2022    Procedure: HEMICOLECTOMY, RIGHT, extended;  Surgeon: EDILMA Bonilla MD;  [facility];  Service: Colon and Rectal;  Laterality: N/A;  Extended right hemicolectomy CONSENT IN AM     Family History:   Family History   Problem Relation Age of Onset    Cancer Brother        Social History:  reports that he has never smoked. He has never used smokeless tobacco. He reports that he does not currently use alcohol. He reports that he does not use drugs.    Allergies:  Review of patient's allergies indicates:  No Known Allergies    Medications:  Current Outpatient Medications   Medication Sig Dispense Refill    amLODIPine (NORVASC) 10 MG tablet Take 1 tablet (10 mg  total) by mouth once daily. 90 tablet 3    ondansetron (ZOFRAN) 4 MG tablet Take 1 tablet (4 mg total) by mouth every 8 (eight) hours as needed for Nausea. 30 tablet 3    acetaminophen (TYLENOL) 500 MG tablet Take 1 tablet (500 mg total) by mouth every 6 (six) hours as needed for Pain (alternate with ibuprofen). (Patient not taking: Reported on 12/26/2023)  0    ibuprofen (ADVIL,MOTRIN) 400 MG tablet Take 1 tablet (400 mg total) by mouth every 6 (six) hours as needed for Other (pain). Alternate with tylenol (Patient not taking: Reported on 12/26/2023)      oxyCODONE (ROXICODONE) 5 MG immediate release tablet Take 1 tablet (5 mg total) by mouth every 6 (six) hours as needed for Pain. (Patient not taking: Reported on 12/26/2023) 20 tablet 0    tamsulosin (FLOMAX) 0.4 mg Cap Take 1 capsule (0.4 mg total) by mouth once daily. (Patient not taking: Reported on 6/28/2023) 30 capsule 5     No current facility-administered medications for this visit.     Review of Systems   Constitutional:  Negative for activity change, appetite change, chills, diaphoresis, fatigue, fever and unexpected weight change.   HENT:  Negative for congestion, mouth sores, nosebleeds, postnasal drip, rhinorrhea, trouble swallowing and voice change.    Eyes:  Negative for pain and visual disturbance.   Respiratory:  Negative for cough, chest tightness, shortness of breath and wheezing.    Cardiovascular:  Negative for chest pain, palpitations and leg swelling.   Gastrointestinal:  Negative for abdominal distention, abdominal pain, blood in stool, constipation, diarrhea, nausea and vomiting.   Genitourinary:  Negative for difficulty urinating, dysuria, flank pain, frequency, hematuria and urgency.   Musculoskeletal:  Negative for arthralgias, back pain and myalgias.   Skin:  Negative for rash and wound.   Neurological:  Negative for dizziness, facial asymmetry, weakness, light-headedness, numbness and headaches.   Psychiatric/Behavioral:  Negative for  "agitation, behavioral problems, confusion, decreased concentration, dysphoric mood and sleep disturbance. The patient is not nervous/anxious.    All other systems reviewed and are negative.    ECOG Performance Status: 1     Objective:      Vitals:   Vitals:    03/28/24 1028   BP: 115/69   BP Location: Left arm   Patient Position: Sitting   BP Method: Medium (Automatic)   Pulse: 70   Temp: 97.8 °F (36.6 °C)   TempSrc: Oral   SpO2: 100%   Weight: 60.5 kg (133 lb 6.1 oz)   Height: 5' 7" (1.702 m)     Physical Exam  Vitals reviewed.   Constitutional:       General: He is not in acute distress.     Appearance: Normal appearance. He is not ill-appearing, toxic-appearing or diaphoretic.   HENT:      Head: Normocephalic and atraumatic.      Right Ear: External ear normal.      Left Ear: External ear normal.      Nose: Nose normal.      Mouth/Throat:      Pharynx: Oropharynx is clear.   Eyes:      General: No scleral icterus.     Extraocular Movements: Extraocular movements intact.      Conjunctiva/sclera: Conjunctivae normal.      Pupils: Pupils are equal, round, and reactive to light.   Cardiovascular:      Rate and Rhythm: Normal rate and regular rhythm.      Pulses: Normal pulses.      Heart sounds: Normal heart sounds. No murmur heard.  Pulmonary:      Effort: Pulmonary effort is normal. No respiratory distress.      Breath sounds: Normal breath sounds. No wheezing.   Chest:      Comments: Port to RCW, no signs of infection.  Abdominal:      General: Abdomen is flat. Bowel sounds are normal. There is no distension.      Palpations: Abdomen is soft. There is no mass.      Tenderness: There is no abdominal tenderness.      Comments: Vertical midline abdominal wound well healed   Musculoskeletal:         General: No swelling or deformity. Normal range of motion.      Right lower leg: No edema.      Left lower leg: No edema.   Skin:     Coloration: Skin is not jaundiced or pale.      Findings: No bruising, erythema or rash. "   Neurological:      General: No focal deficit present.      Mental Status: He is alert and oriented to person, place, and time. Mental status is at baseline.      Cranial Nerves: No cranial nerve deficit.      Sensory: No sensory deficit.      Gait: Gait normal.   Psychiatric:         Mood and Affect: Mood normal.         Behavior: Behavior normal.         Thought Content: Thought content normal.         Judgment: Judgment normal.     Laboratory Data:  Lab Visit on 03/28/2024   Component Date Value Ref Range Status    Specimen UA 03/28/2024 Urine, Clean Catch   Final    Color, UA 03/28/2024 Yellow  Yellow, Straw, [place] Final    Appearance, UA 03/28/2024 Clear  Clear Final    pH, UA 03/28/2024 7.0  5.0 - 8.0 Final    Specific Gravity, UA 03/28/2024 1.010  1.005 - 1.030 Final    Protein, UA 03/28/2024 Negative  Negative Final    Comment: Recommend a 24 hour urine protein or a urine   protein/creatinine ratio if globulin induced proteinuria is  clinically suspected.      Glucose, UA 03/28/2024 Negative  Negative Final    Ketones, UA 03/28/2024 Negative  Negative Final    Bilirubin (UA) 03/28/2024 Negative  Negative Final    Occult Blood UA 03/28/2024 Negative  Negative Final    Nitrite, UA 03/28/2024 Negative  Negative Final    Leukocytes, UA 03/28/2024 Negative  Negative Final   Lab Visit on 03/28/2024   Component Date Value Ref Range Status    WBC 03/28/2024 2.60 (L)  3.90 - 12.70 K/uL Final    RBC 03/28/2024 3.65 (L)  4.60 - 6.20 M/uL Final    Hemoglobin 03/28/2024 9.3 (L)  14.0 - 18.0 g/dL Final    Hematocrit 03/28/2024 31.3 (L)  40.0 - 54.0 % Final    MCV 03/28/2024 86  82 - 98 fL Final    MCH 03/28/2024 25.5 (L)  27.0 - 31.0 pg Final    MCHC 03/28/2024 29.7 (L)  32.0 - 36.0 g/dL Final    RDW 03/28/2024 18.1 (H)  11.5 - 14.5 % Final    Platelets 03/28/2024 196  150 - 450 K/uL Final    MPV 03/28/2024 10.8  9.2 - 12.9 fL Final    Immature Granulocytes 03/28/2024 0.0  0.0 - 0.5 % Final    Gran # (ANC) 03/28/2024 1.2  (L)  1.8 - 7.7 K/uL Final    Immature Grans (Abs) 03/28/2024 0.00  0.00 - 0.04 K/uL Final    Comment: Mild elevation in immature granulocytes is non specific and   can be seen in a variety of conditions including stress response,   acute inflammation, trauma and pregnancy. Correlation with other   laboratory and clinical findings is essential.      Lymph # 03/28/2024 0.8 (L)  1.0 - 4.8 K/uL Final    Mono # 03/28/2024 0.6  0.3 - 1.0 K/uL Final    Eos # 03/28/2024 0.0  0.0 - 0.5 K/uL Final    Baso # 03/28/2024 0.02  0.00 - 0.20 K/uL Final    nRBC 03/28/2024 0  0 /100 WBC Final    Gran % 03/28/2024 45.3  38.0 - 73.0 % Final    Lymph % 03/28/2024 30.0  18.0 - 48.0 % Final    Mono % 03/28/2024 23.1 (H)  4.0 - 15.0 % Final    Eosinophil % 03/28/2024 0.8  0.0 - 8.0 % Final    Basophil % 03/28/2024 0.8  0.0 - 1.9 % Final    Differential Method 03/28/2024 Automated   Final    Sodium 03/28/2024 141  136 - 145 mmol/L Final    Potassium 03/28/2024 5.4 (H)  3.5 - 5.1 mmol/L Final    *No Visible Hemolysis    Chloride 03/28/2024 111 (H)  95 - 110 mmol/L Final    CO2 03/28/2024 25  23 - 29 mmol/L Final    Glucose 03/28/2024 87  70 - 110 mg/dL Final    BUN 03/28/2024 16  8 - 23 mg/dL Final    Creatinine 03/28/2024 1.1  0.5 - 1.4 mg/dL Final    Calcium 03/28/2024 9.0  8.7 - 10.5 mg/dL Final    Total Protein 03/28/2024 6.4  6.0 - 8.4 g/dL Final    Albumin 03/28/2024 3.3 (L)  3.5 - 5.2 g/dL Final    Total Bilirubin 03/28/2024 1.2 (H)  0.1 - 1.0 mg/dL Final    Comment: For infants and newborns, interpretation of results should be based  on gestational age, weight and in agreement with clinical  observations.    Premature Infant recommended reference ranges:  Up to 24 hours.............<8.0 mg/dL  Up to 48 hours............<12.0 mg/dL  3-5 days..................<15.0 mg/dL  6-29 days.................<15.0 mg/dL      Alkaline Phosphatase 03/28/2024 130  55 - 135 U/L Final    AST 03/28/2024 28  10 - 40 U/L Final    ALT 03/28/2024 20  10 -  44 U/L Final    eGFR 03/28/2024 >60.0  >60 mL/min/1.73 m^2 Final    Anion Gap 03/28/2024 5 (L)  8 - 16 mmol/L Final    CEA 03/28/2024 2.2  0.0 - 5.0 ng/mL Final    Comment: CEA Normal Range:  Non-Smokers: 0-3.0 ng/mL  Smokers:     0-5.0 ng/mL    The testing method is a chemiluminescent microparticle immunoassay   manufactured by [employer] and performed on the [employer]   or   u[employer] system. Values obtained with different assay manufacturers   for   methods may be different and cannot be used interchangeably.       Assessment and Plan        1. Metastatic colon cancer to liver    2. Immunodeficiency secondary to neoplasm    3. Immunodeficiency due to chemotherapy    4. Hypertension, unspecified type    5. MARIA A (acute kidney injury)    6. Anemia associated with chemotherapy    7. Pain    8. Drug-induced constipation    9. Severe malnutrition    10. Weight decrease    11. Lower urinary tract symptoms (LUTS)    12. Chemotherapy induced neutropenia      1-3.  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  Guardant 360 was negative for BRAF, VIRI, HER2 amplification.   Pretreatment CEA 57.  We had a long and sonia discussion with him about his diagnosis. Unfortunately, the disease is not curable but remains treatable and he has good performance status.   Restaging scans after 7 cycles of FOLFOX + Pema showed significant reduction in colonic mass and liver mass.   we switched to maintenance as of cycle 8 with 5FU + Pema  Restaging scans from March 2022 show stable disease.   MRI on 7/18/22 showing hepatic progression of disease in the right hepatic lobe noted on the exam. CT CAP on 8/26 shows some mild progression in both liver metastases.    Discussed case at colorectal tumor board 8/31/22 and had a diagnostic lap performed by Dr. Rodriguez on 9/7 to assess for any occult peritoneal disease. Findings from the diagnostic lap were negative. Fluid from around from around the primary  mass was sent for cytology that was negative for malignancy.   Underwent primary tumor resection on 10/20/22 with Dr. Bonilla.    Meanwhile his liver mets had grown.  We discussed his case at [facility] conference with plans for short term Y-90 and then restarting chemotherapy prior to considering any surgical options to his liver metastases.  He underwent Y-90 delivery on 12/30/22. Tolerated well.   CT CAP on 1/23/23 reviewed at [facility] conference with good response to Y-90, no new lesions.  Underwent second Y-90 on 1/27/23. Can consider R hepatectomy pending follow-up imaging after Y-90.     Received cycle 42 of FOLFOX (omitted oxaliplatin again starting cycle 41 due to neuropathy) on 2/9/23.  Because of 5-FU shortage nationally, we have been holding chemotherapy since mid-February 2023.  If he needs to restart chemotherapy (I.e. no plans for surgical resection), will place him on capecitabine maintenance for duration of 5-FU shortage.     Discussed at colorectal liver mets tumor board 3/16/23.  Plan for repeat Y-90 and then consideration of surgical resection and he will meet with liver transplant team as well.  Underwent Y-90 delivery 5/17/23 with IR.     Has been on maintenance Xeloda since late April 2023.    Met with liver transplant team but ultimately opted not to proceed with transplant as he was concerned about how he would tolerate a major surgery with the life changes that would come after transplant as well. They closed out his case.    He met with Dr. Rodriguez to discuss whether surgical resection is indicated for his liver mets.  He recommended repeat MRI.  Repeat MRI unfortunately showed disease progression while on Xeloda maintenance.  Similarly his CEA garrett precipitously, all in keeping with worsening disease.    We recommended we restart IV chemotherapy with FOLFIRI + Avastin (never had irinotecan).    Because of hyperbilirubinemia he received cycle 1 with just 5-FU + Avastin on 7/11/23. Tolerated this  well. Bilirubin has improved.  Received irinotecan starting with cycle 2.  Repeat CT CAP after cycle 4 shows good response to therapy.   Excellent tolerance. [facility] tumor board agrees with continuation of chemotherapy.   Repeat CT CAP after cycle 7 shows stable disease, no evidence of new or worsening disease.   Repeat CT CAP after cycle 11 shows stable disease.   Repeat CT CAP after cycle 15 shows improved disease.    Doing well today.   Labs reviewed, adequate for treatment. ANC 1200. Bilirubin slightly increased to 1.2.   Will proceed with cycle 20 on Monday. Will dose reduce irinotecan to 150 mg/m2 and 5-FU to 2200 mg/m2 starting with cycle 20 d/t lab abnormalities.   Repeat imaging after cycle 20.     -RTC in 2 weeks for next cycle with UA.     Tempus: APC, CKS1B cng, ERCC3 cnl, PIK3CA; KENIA, TMB 12.1.    4. Hypertension   -- BP well controlled today. Feels well.   -- Following with PCP.   -- encouraged him and his daughter to monitor BP and HR closely at home.     5. MARIA A  -- Resolved   -- Monitor. Encouraged continued hydration particularly with working outside.     6. Anemia  -- Hgb mildly downtrending, asymptomatic. Monitor.  -- No signs of bleeding. Platelets normal.     7-10. Neoplasm related pain, weight loss, constipation  -- Pain very well controlled. Has oxycodone 5mg to use PRN but not requiring now.  -- Following with nutrition. Encouraged increased protein. Monitor.  -- Continue Miralax daily for constipation.    11, Urinary Hesitancy.  -- Continue Flomax.  -- Reports improvement since starting medication.     Patient is in agreement with the proposed treatment plan. All questions were answered to the patient's satisfaction. Pt knows to call clinic if anything is needed before the next clinic visit.    Patient discussed with collaborating physician, Dr. Schuster.    At least 40 minutes were spent today on this encounter including face to face time with the patient, data gathering/interpretation and  documentation.       Natividad Maher, MSN, APRN, [facility]  Hematology and Medical Oncology  Clinical Nurse Specialist to Dr. Schuster, Dr. Quijano & Dr. Mueller    Route Chart for Scheduling    Med Onc Chart Routing      Follow up with physician . As scheduled   Follow up with RACHEL 6 weeks. with labs and infusion   Infusion scheduling note   infusion every 2 weeks, pump d/c on day 3   Injection scheduling note    Labs CBC, CMP, CEA and urinalysis   Scheduling:  Preferred lab:  Lab interval: every 2 weeks     Imaging    Pharmacy appointment    Other referrals

## 2024-04-01 ENCOUNTER — INFUSION (OUTPATIENT)
Dept: INFUSION THERAPY | Facility: HOSPITAL | Age: 74
End: 2024-04-01
Payer: MEDICARE

## 2024-04-01 VITALS
TEMPERATURE: 98 F | WEIGHT: 133.38 LBS | HEIGHT: 67 IN | DIASTOLIC BLOOD PRESSURE: 60 MMHG | HEART RATE: 64 BPM | RESPIRATION RATE: 18 BRPM | SYSTOLIC BLOOD PRESSURE: 123 MMHG | OXYGEN SATURATION: 100 % | BODY MASS INDEX: 20.93 KG/M2

## 2024-04-01 DIAGNOSIS — C78.7 METASTATIC COLON CANCER TO LIVER: Primary | ICD-10-CM

## 2024-04-01 DIAGNOSIS — C18.9 METASTATIC COLON CANCER TO LIVER: Primary | ICD-10-CM

## 2024-04-01 PROCEDURE — 96416 CHEMO PROLONG INFUSE W/PUMP: CPT

## 2024-04-01 PROCEDURE — 96413 CHEMO IV INFUSION 1 HR: CPT

## 2024-04-01 PROCEDURE — 96367 TX/PROPH/DG ADDL SEQ IV INF: CPT

## 2024-04-01 PROCEDURE — 96361 HYDRATE IV INFUSION ADD-ON: CPT

## 2024-04-01 PROCEDURE — 63600175 PHARM REV CODE 636 W HCPCS: Mod: JZ,JG | Performed by: REGISTERED NURSE

## 2024-04-01 PROCEDURE — 96375 TX/PRO/DX INJ NEW DRUG ADDON: CPT

## 2024-04-01 PROCEDURE — 25000003 PHARM REV CODE 250: Performed by: REGISTERED NURSE

## 2024-04-01 PROCEDURE — 96417 CHEMO IV INFUS EACH ADDL SEQ: CPT

## 2024-04-01 RX ORDER — HEPARIN 100 UNIT/ML
500 SYRINGE INTRAVENOUS
Status: DISCONTINUED | OUTPATIENT
Start: 2024-04-01 | End: 2024-04-01 | Stop reason: HOSPADM

## 2024-04-01 RX ORDER — DIPHENHYDRAMINE HYDROCHLORIDE 50 MG/ML
50 INJECTION INTRAMUSCULAR; INTRAVENOUS ONCE AS NEEDED
Status: DISCONTINUED | OUTPATIENT
Start: 2024-04-01 | End: 2024-04-01 | Stop reason: HOSPADM

## 2024-04-01 RX ORDER — ATROPINE SULFATE 0.4 MG/ML
0.4 INJECTION, SOLUTION ENDOTRACHEAL; INTRAMEDULLARY; INTRAMUSCULAR; INTRAVENOUS; SUBCUTANEOUS ONCE AS NEEDED
Status: COMPLETED | OUTPATIENT
Start: 2024-04-01 | End: 2024-04-01

## 2024-04-01 RX ORDER — PROCHLORPERAZINE EDISYLATE 5 MG/ML
5 INJECTION INTRAMUSCULAR; INTRAVENOUS ONCE AS NEEDED
Status: DISCONTINUED | OUTPATIENT
Start: 2024-04-01 | End: 2024-04-01 | Stop reason: HOSPADM

## 2024-04-01 RX ORDER — EPINEPHRINE 0.3 MG/.3ML
0.3 INJECTION SUBCUTANEOUS ONCE AS NEEDED
Status: DISCONTINUED | OUTPATIENT
Start: 2024-04-01 | End: 2024-04-01 | Stop reason: HOSPADM

## 2024-04-01 RX ORDER — SODIUM CHLORIDE 0.9 % (FLUSH) 0.9 %
10 SYRINGE (ML) INJECTION
Status: DISCONTINUED | OUTPATIENT
Start: 2024-04-01 | End: 2024-04-01 | Stop reason: HOSPADM

## 2024-04-01 RX ADMIN — IRINOTECAN HYDROCHLOIDE 240 MG: 20 INJECTION INTRAVENOUS at 10:04

## 2024-04-01 RX ADMIN — ATROPINE SULFATE 0.4 MG: 0.4 INJECTION, SOLUTION INTRAVENOUS at 10:04

## 2024-04-01 RX ADMIN — SODIUM CHLORIDE: 9 INJECTION, SOLUTION INTRAVENOUS at 09:04

## 2024-04-01 RX ADMIN — FLUOROURACIL 3695 MG: 50 INJECTION, SOLUTION INTRAVENOUS at 11:04

## 2024-04-01 RX ADMIN — BEVACIZUMAB-AWWB 300 MG: 100 INJECTION, SOLUTION INTRAVENOUS at 09:04

## 2024-04-01 RX ADMIN — DEXAMETHASONE SODIUM PHOSPHATE 0.25 MG: 4 INJECTION, SOLUTION INTRA-ARTICULAR; INTRALESIONAL; INTRAMUSCULAR; INTRAVENOUS; SOFT TISSUE at 09:04

## 2024-04-01 RX ADMIN — SODIUM CHLORIDE 1000 ML: 9 INJECTION, SOLUTION INTRAVENOUS at 08:04

## 2024-04-01 NOTE — PLAN OF CARE
Patient tolerated IVFs/Mvasi/Irinotecan infusion today. CADD pump infusing 5fu @ 2.2 ml/hr for 46 hrs. Verified by 2 Rns. RTC 4/3/24 @ 1000 for pump dc. NAD noted. VSS. Discharged home

## 2024-04-03 ENCOUNTER — INFUSION (OUTPATIENT)
Dept: INFUSION THERAPY | Facility: HOSPITAL | Age: 74
End: 2024-04-03
Payer: MEDICARE

## 2024-04-03 VITALS
OXYGEN SATURATION: 100 % | SYSTOLIC BLOOD PRESSURE: 122 MMHG | DIASTOLIC BLOOD PRESSURE: 72 MMHG | HEART RATE: 67 BPM | RESPIRATION RATE: 18 BRPM

## 2024-04-03 DIAGNOSIS — C78.7 METASTATIC COLON CANCER TO LIVER: Primary | ICD-10-CM

## 2024-04-03 DIAGNOSIS — C18.9 METASTATIC COLON CANCER TO LIVER: Primary | ICD-10-CM

## 2024-04-03 PROCEDURE — 25000003 PHARM REV CODE 250: Performed by: REGISTERED NURSE

## 2024-04-03 PROCEDURE — 63600175 PHARM REV CODE 636 W HCPCS: Performed by: REGISTERED NURSE

## 2024-04-03 PROCEDURE — A4216 STERILE WATER/SALINE, 10 ML: HCPCS | Performed by: REGISTERED NURSE

## 2024-04-03 RX ORDER — SODIUM CHLORIDE 0.9 % (FLUSH) 0.9 %
10 SYRINGE (ML) INJECTION
Status: DISCONTINUED | OUTPATIENT
Start: 2024-04-03 | End: 2024-04-03 | Stop reason: HOSPADM

## 2024-04-03 RX ORDER — HEPARIN 100 UNIT/ML
500 SYRINGE INTRAVENOUS
Status: DISCONTINUED | OUTPATIENT
Start: 2024-04-03 | End: 2024-04-03 | Stop reason: HOSPADM

## 2024-04-03 RX ADMIN — Medication 10 ML: at 10:04

## 2024-04-03 RX ADMIN — HEPARIN 500 UNITS: 100 SYRINGE at 10:04

## 2024-04-03 NOTE — PLAN OF CARE
1016 Pt tolerated home infusion well, no complaints or complications reported, VSS, vessel empty upon arrival. Pt disconnected from pump, positive blood return noted. Pt aware to call provider with any questions or concerns, aware of upcoming appts, ambulatory from clinic with steady gait, no distress noted.

## 2024-04-05 DIAGNOSIS — C78.7 COLON CANCER METASTASIZED TO LIVER: ICD-10-CM

## 2024-04-05 DIAGNOSIS — C18.9 COLON CANCER METASTASIZED TO LIVER: ICD-10-CM

## 2024-04-05 RX ORDER — LIDOCAINE AND PRILOCAINE 25; 25 MG/G; MG/G
CREAM TOPICAL
Qty: 30 G | Refills: 5 | Status: CANCELLED | OUTPATIENT
Start: 2024-04-05

## 2024-04-06 DIAGNOSIS — C18.9 COLON CANCER METASTASIZED TO LIVER: ICD-10-CM

## 2024-04-06 DIAGNOSIS — C78.7 COLON CANCER METASTASIZED TO LIVER: ICD-10-CM

## 2024-04-06 RX ORDER — LIDOCAINE AND PRILOCAINE 25; 25 MG/G; MG/G
CREAM TOPICAL
Qty: 30 G | Refills: 5 | Status: CANCELLED | OUTPATIENT
Start: 2024-04-05

## 2024-04-11 DIAGNOSIS — C18.9 COLON CANCER METASTASIZED TO LIVER: ICD-10-CM

## 2024-04-11 DIAGNOSIS — C78.7 COLON CANCER METASTASIZED TO LIVER: ICD-10-CM

## 2024-04-11 RX ORDER — LIDOCAINE AND PRILOCAINE 25; 25 MG/G; MG/G
CREAM TOPICAL
Qty: 30 G | Refills: 5 | Status: CANCELLED | OUTPATIENT
Start: 2024-04-05

## 2024-04-15 ENCOUNTER — OFFICE VISIT (OUTPATIENT)
Dept: HEMATOLOGY/ONCOLOGY | Facility: CLINIC | Age: 74
End: 2024-04-15
Payer: MEDICARE

## 2024-04-15 ENCOUNTER — INFUSION (OUTPATIENT)
Dept: INFUSION THERAPY | Facility: HOSPITAL | Age: 74
End: 2024-04-15
Payer: MEDICARE

## 2024-04-15 VITALS — SYSTOLIC BLOOD PRESSURE: 120 MMHG | DIASTOLIC BLOOD PRESSURE: 70 MMHG | HEART RATE: 63 BPM

## 2024-04-15 VITALS
BODY MASS INDEX: 20.84 KG/M2 | DIASTOLIC BLOOD PRESSURE: 69 MMHG | RESPIRATION RATE: 18 BRPM | HEART RATE: 65 BPM | SYSTOLIC BLOOD PRESSURE: 126 MMHG | HEIGHT: 67 IN | WEIGHT: 132.81 LBS | TEMPERATURE: 98 F | OXYGEN SATURATION: 100 %

## 2024-04-15 DIAGNOSIS — T45.1X5A ANEMIA ASSOCIATED WITH CHEMOTHERAPY: ICD-10-CM

## 2024-04-15 DIAGNOSIS — D84.821 IMMUNODEFICIENCY DUE TO CHEMOTHERAPY: ICD-10-CM

## 2024-04-15 DIAGNOSIS — C18.9 METASTATIC COLON CANCER TO LIVER: Primary | ICD-10-CM

## 2024-04-15 DIAGNOSIS — R39.9 LOWER URINARY TRACT SYMPTOMS (LUTS): ICD-10-CM

## 2024-04-15 DIAGNOSIS — C78.7 METASTATIC COLON CANCER TO LIVER: Primary | ICD-10-CM

## 2024-04-15 DIAGNOSIS — K59.03 DRUG-INDUCED CONSTIPATION: ICD-10-CM

## 2024-04-15 DIAGNOSIS — T45.1X5A IMMUNODEFICIENCY DUE TO CHEMOTHERAPY: ICD-10-CM

## 2024-04-15 DIAGNOSIS — R52 PAIN: ICD-10-CM

## 2024-04-15 DIAGNOSIS — Z79.899 IMMUNODEFICIENCY DUE TO CHEMOTHERAPY: ICD-10-CM

## 2024-04-15 DIAGNOSIS — R63.4 WEIGHT DECREASE: ICD-10-CM

## 2024-04-15 DIAGNOSIS — D49.9 IMMUNODEFICIENCY SECONDARY TO NEOPLASM: ICD-10-CM

## 2024-04-15 DIAGNOSIS — N17.9 AKI (ACUTE KIDNEY INJURY): ICD-10-CM

## 2024-04-15 DIAGNOSIS — D84.81 IMMUNODEFICIENCY SECONDARY TO NEOPLASM: ICD-10-CM

## 2024-04-15 DIAGNOSIS — E87.5 HYPERKALEMIA: ICD-10-CM

## 2024-04-15 DIAGNOSIS — D64.81 ANEMIA ASSOCIATED WITH CHEMOTHERAPY: ICD-10-CM

## 2024-04-15 DIAGNOSIS — I10 HYPERTENSION, UNSPECIFIED TYPE: ICD-10-CM

## 2024-04-15 PROCEDURE — 25000003 PHARM REV CODE 250: Performed by: PHYSICIAN ASSISTANT

## 2024-04-15 PROCEDURE — 1101F PT FALLS ASSESS-DOCD LE1/YR: CPT | Mod: CPTII,S$GLB,, | Performed by: PHYSICIAN ASSISTANT

## 2024-04-15 PROCEDURE — 1126F AMNT PAIN NOTED NONE PRSNT: CPT | Mod: CPTII,S$GLB,, | Performed by: PHYSICIAN ASSISTANT

## 2024-04-15 PROCEDURE — 96416 CHEMO PROLONG INFUSE W/PUMP: CPT

## 2024-04-15 PROCEDURE — 96375 TX/PRO/DX INJ NEW DRUG ADDON: CPT

## 2024-04-15 PROCEDURE — 1159F MED LIST DOCD IN RCRD: CPT | Mod: CPTII,S$GLB,, | Performed by: PHYSICIAN ASSISTANT

## 2024-04-15 PROCEDURE — 96367 TX/PROPH/DG ADDL SEQ IV INF: CPT

## 2024-04-15 PROCEDURE — 99215 OFFICE O/P EST HI 40 MIN: CPT | Mod: S$GLB,,, | Performed by: PHYSICIAN ASSISTANT

## 2024-04-15 PROCEDURE — 63600175 PHARM REV CODE 636 W HCPCS: Performed by: PHYSICIAN ASSISTANT

## 2024-04-15 PROCEDURE — 3288F FALL RISK ASSESSMENT DOCD: CPT | Mod: CPTII,S$GLB,, | Performed by: PHYSICIAN ASSISTANT

## 2024-04-15 PROCEDURE — 96413 CHEMO IV INFUSION 1 HR: CPT

## 2024-04-15 PROCEDURE — 99999 PR PBB SHADOW E&M-EST. PATIENT-LVL III: CPT | Mod: PBBFAC,,, | Performed by: PHYSICIAN ASSISTANT

## 2024-04-15 PROCEDURE — G2211 COMPLEX E/M VISIT ADD ON: HCPCS | Mod: S$GLB,,, | Performed by: PHYSICIAN ASSISTANT

## 2024-04-15 PROCEDURE — 3078F DIAST BP <80 MM HG: CPT | Mod: CPTII,S$GLB,, | Performed by: PHYSICIAN ASSISTANT

## 2024-04-15 PROCEDURE — 96417 CHEMO IV INFUS EACH ADDL SEQ: CPT

## 2024-04-15 PROCEDURE — 3074F SYST BP LT 130 MM HG: CPT | Mod: CPTII,S$GLB,, | Performed by: PHYSICIAN ASSISTANT

## 2024-04-15 PROCEDURE — 3008F BODY MASS INDEX DOCD: CPT | Mod: CPTII,S$GLB,, | Performed by: PHYSICIAN ASSISTANT

## 2024-04-15 RX ORDER — SODIUM CHLORIDE 0.9 % (FLUSH) 0.9 %
10 SYRINGE (ML) INJECTION
Status: DISCONTINUED | OUTPATIENT
Start: 2024-04-15 | End: 2024-04-15 | Stop reason: HOSPADM

## 2024-04-15 RX ORDER — HEPARIN 100 UNIT/ML
500 SYRINGE INTRAVENOUS
Status: DISCONTINUED | OUTPATIENT
Start: 2024-04-15 | End: 2024-04-15 | Stop reason: HOSPADM

## 2024-04-15 RX ORDER — PROCHLORPERAZINE EDISYLATE 5 MG/ML
5 INJECTION INTRAMUSCULAR; INTRAVENOUS ONCE AS NEEDED
Status: DISCONTINUED | OUTPATIENT
Start: 2024-04-15 | End: 2024-04-15 | Stop reason: HOSPADM

## 2024-04-15 RX ORDER — HEPARIN 100 UNIT/ML
500 SYRINGE INTRAVENOUS
Status: CANCELLED | OUTPATIENT
Start: 2024-04-19

## 2024-04-15 RX ORDER — ATROPINE SULFATE 0.4 MG/ML
0.4 INJECTION, SOLUTION ENDOTRACHEAL; INTRAMEDULLARY; INTRAMUSCULAR; INTRAVENOUS; SUBCUTANEOUS ONCE AS NEEDED
Status: CANCELLED | OUTPATIENT
Start: 2024-04-17

## 2024-04-15 RX ORDER — SODIUM POLYSTYRENE SULFONATE 4.1 MEQ/G
15 POWDER, FOR SUSPENSION ORAL; RECTAL ONCE
Qty: 15 G | Refills: 0 | Status: SHIPPED | OUTPATIENT
Start: 2024-04-15 | End: 2024-04-18

## 2024-04-15 RX ORDER — HEPARIN 100 UNIT/ML
500 SYRINGE INTRAVENOUS
Status: CANCELLED | OUTPATIENT
Start: 2024-04-17

## 2024-04-15 RX ORDER — TAMSULOSIN HYDROCHLORIDE 0.4 MG/1
0.4 CAPSULE ORAL DAILY
Qty: 30 CAPSULE | Refills: 5 | Status: SHIPPED | OUTPATIENT
Start: 2024-04-15 | End: 2025-04-15

## 2024-04-15 RX ORDER — LIDOCAINE AND PRILOCAINE 25; 25 MG/G; MG/G
CREAM TOPICAL
Qty: 30 G | Refills: 3 | Status: SHIPPED | OUTPATIENT
Start: 2024-04-15

## 2024-04-15 RX ORDER — DIPHENHYDRAMINE HYDROCHLORIDE 50 MG/ML
50 INJECTION INTRAMUSCULAR; INTRAVENOUS ONCE AS NEEDED
Status: CANCELLED | OUTPATIENT
Start: 2024-04-17

## 2024-04-15 RX ORDER — SODIUM CHLORIDE 0.9 % (FLUSH) 0.9 %
10 SYRINGE (ML) INJECTION
Status: CANCELLED | OUTPATIENT
Start: 2024-04-19

## 2024-04-15 RX ORDER — EPINEPHRINE 0.3 MG/.3ML
0.3 INJECTION SUBCUTANEOUS ONCE AS NEEDED
Status: CANCELLED | OUTPATIENT
Start: 2024-04-17

## 2024-04-15 RX ORDER — SODIUM CHLORIDE 0.9 % (FLUSH) 0.9 %
10 SYRINGE (ML) INJECTION
Status: CANCELLED | OUTPATIENT
Start: 2024-04-17

## 2024-04-15 RX ORDER — PROCHLORPERAZINE EDISYLATE 5 MG/ML
5 INJECTION INTRAMUSCULAR; INTRAVENOUS ONCE AS NEEDED
Status: CANCELLED
Start: 2024-04-17

## 2024-04-15 RX ORDER — EPINEPHRINE 0.3 MG/.3ML
0.3 INJECTION SUBCUTANEOUS ONCE AS NEEDED
Status: DISCONTINUED | OUTPATIENT
Start: 2024-04-15 | End: 2024-04-15 | Stop reason: HOSPADM

## 2024-04-15 RX ORDER — DIPHENHYDRAMINE HYDROCHLORIDE 50 MG/ML
50 INJECTION INTRAMUSCULAR; INTRAVENOUS ONCE AS NEEDED
Status: DISCONTINUED | OUTPATIENT
Start: 2024-04-15 | End: 2024-04-15 | Stop reason: HOSPADM

## 2024-04-15 RX ORDER — ATROPINE SULFATE 0.4 MG/ML
0.4 INJECTION, SOLUTION ENDOTRACHEAL; INTRAMEDULLARY; INTRAMUSCULAR; INTRAVENOUS; SUBCUTANEOUS ONCE AS NEEDED
Status: COMPLETED | OUTPATIENT
Start: 2024-04-15 | End: 2024-04-15

## 2024-04-15 RX ADMIN — IRINOTECAN HYDROCHLORIDE 240 MG: 20 INJECTION, SOLUTION INTRAVENOUS at 02:04

## 2024-04-15 RX ADMIN — BEVACIZUMAB-AWWB 300 MG: 100 INJECTION, SOLUTION INTRAVENOUS at 01:04

## 2024-04-15 RX ADMIN — SODIUM CHLORIDE: 9 INJECTION, SOLUTION INTRAVENOUS at 12:04

## 2024-04-15 RX ADMIN — FLUOROURACIL 3695 MG: 50 INJECTION, SOLUTION INTRAVENOUS at 04:04

## 2024-04-15 RX ADMIN — ATROPINE SULFATE 0.4 MG: 0.4 INJECTION, SOLUTION INTRAVENOUS at 02:04

## 2024-04-15 RX ADMIN — DEXAMETHASONE SODIUM PHOSPHATE 0.25 MG: 4 INJECTION, SOLUTION INTRA-ARTICULAR; INTRALESIONAL; INTRAMUSCULAR; INTRAVENOUS; SOFT TISSUE at 02:04

## 2024-04-15 NOTE — PLAN OF CARE
Pt here for MVASI and FOLFIRI. PAC accessed with positive blood return.     Pt tolerated infusion without issues. 5FU pump infusing @ 2.2cc/hr over 46 hours- RTC Wed at 2:30 for pump removal. Pump noted to be on RUN. Pt d/c in stable condition, ambulated independently.

## 2024-04-15 NOTE — PROGRESS NOTES
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 Bonilla  12/30/2022: Y-90 to R liver  1/27/2023: Y-90 to R liver  5/17/2023: Y-90 to R liver  7/11/2023: C1 FOLFIRI + Pema  7/26/2023: C2 FOLFIRI + Pema  8/8/2023: C3 FOLFIRI + Pema  8/22/23: C4 FOLFIRI + Pema  Restaging CT CAP 9/1/23: Good response to chemo.  9/7/23: C5 FOLFIRI + Pema  ......................................  Restaging CT CAP 10/12/23: Good response to chemo  10/16/23: C8 FOLFIRI + Pema  .......................................  Restaging CT CAP 12/8/23: Good response to chemo  12/11/23: C12 FOLFIRI + Pema  .......................................  Restaging CT CAP 2/5/24: Good response to chemo  2/8/24: C16 FOLFIRI + Pema    Interval History:   He presents today with his daughter prior to cycle 21 of FOLFIRI plus avastin. He is feeling well without any new complaints or concerns.  No bleeding.  Has some mild folliculitis on his scalp from time to time.  No other issues. Had a good holiday with his family. Eating well and has a good appetite.     ECOG status is 1. Presents with his daughter today.    Past Medical History:   Past Medical History:   Diagnosis Date    MARIA A (acute kidney injury) 8/18/2022    Hypertension     Metastatic colon cancer to liver 4/22/2021     Past Surgical HIstory:   Past Surgical  History:   Procedure Laterality Date    COLONOSCOPY N/A 2021    Procedure: COLONOSCOPY with possible stent;  Surgeon: EDILMA Bonilla MD;  Location: [facility] ENDO (2ND FLR);  Service: Colon and Rectal;  Laterality: N/A;    COLONOSCOPY N/A 11/3/2023    Procedure: COLONOSCOPY;  Surgeon: EDILMA Bonilla MD;  Location: [facility] ENDO (4TH FLR);  Service: Endoscopy;  Laterality: N/A;  prep instructions sent to pt via portal  From: EDILMA Bonilla MD  Procedure: Colonoscopy  Diagnosis: Surveillance colonoscopy - Hx of colon cancer  Procedure Timin-12 weeks  Provider: Myself  Location: [facility] 4Endo  Additional Scheduling Information: No scheduling con    DIAGNOSTIC LAPAROSCOPY N/A 2022    Procedure: LAPAROSCOPY, DIAGNOSTIC;  Surgeon: Adrian Rodriguez MD;  Location: [facility] OR 2ND FLR;  Service: General;  Laterality: N/A;    INSERTION OF TUNNELED CENTRAL VENOUS CATHETER (CVC) WITH SUBCUTANEOUS PORT N/A 5/3/2021    Procedure: [id number];  Surgeon: Francisco Layton MD;  Location: [facility] OR 2ND FLR;  Service: Vascular;  Laterality: N/A;    OMENTECTOMY N/A 10/20/2022    Procedure: OMENTECTOMY;  Surgeon: EDILMA Bonilla MD;  Location: [facility] OR 2ND FLR;  Service: Colon and Rectal;  Laterality: N/A;    RIGHT HEMICOLECTOMY N/A 10/20/2022    Procedure: HEMICOLECTOMY, RIGHT, extended;  Surgeon: EDILMA Bonilla MD;  [facility];  Service: Colon and Rectal;  Laterality: N/A;  Extended right hemicolectomy CONSENT IN AM     Family History:   Family History   Problem Relation Name Age of Onset    Cancer Brother         Social History:  reports that he has never smoked. He has never used smokeless tobacco. He reports that he does not currently use alcohol. He reports that he does not use drugs.    Allergies:  Review of patient's allergies indicates:  No Known Allergies    Medications:  Current Outpatient Medications   Medication Sig Dispense Refill    acetaminophen (TYLENOL) 500 MG tablet Take 1 tablet (500 mg  total) by mouth every 6 (six) hours as needed for Pain (alternate with ibuprofen). (Patient not taking: Reported on 12/26/2023)  0    amLODIPine (NORVASC) 10 MG tablet Take 1 tablet (10 mg total) by mouth once daily. 90 tablet 3    ibuprofen (ADVIL,MOTRIN) 400 MG tablet Take 1 tablet (400 mg total) by mouth every 6 (six) hours as needed for Other (pain). Alternate with tylenol (Patient not taking: Reported on 12/26/2023)      ondansetron (ZOFRAN) 4 MG tablet Take 1 tablet (4 mg total) by mouth every 8 (eight) hours as needed for Nausea. 30 tablet 3    oxyCODONE (ROXICODONE) 5 MG immediate release tablet Take 1 tablet (5 mg total) by mouth every 6 (six) hours as needed for Pain. (Patient not taking: Reported on 12/26/2023) 20 tablet 0    tamsulosin (FLOMAX) 0.4 mg Cap Take 1 capsule (0.4 mg total) by mouth once daily. (Patient not taking: Reported on 6/28/2023) 30 capsule 5     No current facility-administered medications for this visit.     Review of Systems   Constitutional:  Negative for activity change, appetite change, chills, diaphoresis, fatigue, fever and unexpected weight change.   HENT:  Negative for congestion, mouth sores, nosebleeds, postnasal drip, rhinorrhea, trouble swallowing and voice change.    Eyes:  Negative for pain and visual disturbance.   Respiratory:  Negative for cough, chest tightness, shortness of breath and wheezing.    Cardiovascular:  Negative for chest pain, palpitations and leg swelling.   Gastrointestinal:  Negative for abdominal distention, abdominal pain, blood in stool, constipation, diarrhea, nausea and vomiting.   Genitourinary:  Negative for difficulty urinating, dysuria, flank pain, frequency, hematuria and urgency.   Musculoskeletal:  Negative for arthralgias, back pain and myalgias.   Skin:  Negative for rash and wound.   Neurological:  Negative for dizziness, facial asymmetry, weakness, light-headedness, numbness and headaches.   Psychiatric/Behavioral:  Negative for  agitation, behavioral problems, confusion, decreased concentration, dysphoric mood and sleep disturbance. The patient is not nervous/anxious.    All other systems reviewed and are negative.    ECOG Performance Status: 1     Objective:      Vitals:   There were no vitals filed for this visit.        Physical Exam  Vitals reviewed.   Constitutional:       General: He is not in acute distress.     Appearance: Normal appearance. He is not ill-appearing, toxic-appearing or diaphoretic.   HENT:      Head: Normocephalic and atraumatic.      Right Ear: External ear normal.      Left Ear: External ear normal.      Nose: Nose normal.      Mouth/Throat:      Pharynx: Oropharynx is clear.   Eyes:      General: No scleral icterus.     Extraocular Movements: Extraocular movements intact.      Conjunctiva/sclera: Conjunctivae normal.      Pupils: Pupils are equal, round, and reactive to light.   Cardiovascular:      Rate and Rhythm: Normal rate and regular rhythm.      Pulses: Normal pulses.      Heart sounds: Normal heart sounds. No murmur heard.  Pulmonary:      Effort: Pulmonary effort is normal. No respiratory distress.      Breath sounds: Normal breath sounds. No wheezing.   Chest:      Comments: Port to RCW, no signs of infection.  Abdominal:      General: Abdomen is flat. Bowel sounds are normal. There is no distension.      Palpations: Abdomen is soft. There is no mass.      Tenderness: There is no abdominal tenderness.      Comments: Vertical midline abdominal wound well healed   Musculoskeletal:         General: No swelling or deformity. Normal range of motion.      Right lower leg: No edema.      Left lower leg: No edema.   Skin:     Coloration: Skin is not jaundiced or pale.      Findings: No bruising, erythema or rash.   Neurological:      General: No focal deficit present.      Mental Status: He is alert and oriented to person, place, and time. Mental status is at baseline.      Cranial Nerves: No cranial nerve  deficit.      Sensory: No sensory deficit.      Gait: Gait normal.   Psychiatric:         Mood and Affect: Mood normal.         Behavior: Behavior normal.         Thought Content: Thought content normal.         Judgment: Judgment normal.     Laboratory Data:  No visits with results within 1 Week(s) from this visit.   Latest known visit with results is:   Lab Visit on 03/28/2024   Component Date Value Ref Range Status    Specimen UA 03/28/2024 Urine, Clean Catch   Final    Color, UA 03/28/2024 Yellow  Yellow, Straw, [place] Final    Appearance, UA 03/28/2024 Clear  Clear Final    pH, UA 03/28/2024 7.0  5.0 - 8.0 Final    Specific Gravity, UA 03/28/2024 1.010  1.005 - 1.030 Final    Protein, UA 03/28/2024 Negative  Negative Final    Comment: Recommend a 24 hour urine protein or a urine   protein/creatinine ratio if globulin induced proteinuria is  clinically suspected.      Glucose, UA 03/28/2024 Negative  Negative Final    Ketones, UA 03/28/2024 Negative  Negative Final    Bilirubin (UA) 03/28/2024 Negative  Negative Final    Occult Blood UA 03/28/2024 Negative  Negative Final    Nitrite, UA 03/28/2024 Negative  Negative Final    Leukocytes, UA 03/28/2024 Negative  Negative Final     Assessment and Plan        1. Metastatic colon cancer to liver    2. Immunodeficiency secondary to neoplasm    3. Immunodeficiency due to chemotherapy    4. Hypertension, unspecified type    5. MARIA A (acute kidney injury)    6. Anemia associated with chemotherapy    7. Pain    8. Drug-induced constipation    9. Weight decrease    10. Lower urinary tract symptoms (LUTS)              1-3.  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  Guardant 360 was negative for BRAF, VIRI, HER2 amplification.   Pretreatment CEA 57.  We had a long and sonia discussion with him about his diagnosis. Unfortunately, the disease is not curable but remains treatable and he has good performance status.   Restaging scans after 7  cycles of FOLFOX + Pema showed significant reduction in colonic mass and liver mass.   we switched to maintenance as of cycle 8 with 5FU + Pema  Restaging scans from March 2022 show stable disease.   MRI on 7/18/22 showing hepatic progression of disease in the right hepatic lobe noted on the exam. CT CAP on 8/26 shows some mild progression in both liver metastases.    Discussed case at colorectal tumor board 8/31/22 and had a diagnostic lap performed by Dr. Rodriguez on 9/7 to assess for any occult peritoneal disease. Findings from the diagnostic lap were negative. Fluid from around from around the primary mass was sent for cytology that was negative for malignancy.   Underwent primary tumor resection on 10/20/22 with Dr. Bonilla.    Meanwhile his liver mets had grown.  We discussed his case at [facility] conference with plans for short term Y-90 and then restarting chemotherapy prior to considering any surgical options to his liver metastases.  He underwent Y-90 delivery on 12/30/22. Tolerated well.   CT CAP on 1/23/23 reviewed at [facility] conference with good response to Y-90, no new lesions.  Underwent second Y-90 on 1/27/23. Can consider R hepatectomy pending follow-up imaging after Y-90.     Received cycle 42 of FOLFOX (omitted oxaliplatin again starting cycle 41 due to neuropathy) on 2/9/23.  Because of 5-FU shortage nationally, we have been holding chemotherapy since mid-February 2023.  If he needs to restart chemotherapy (I.e. no plans for surgical resection), will place him on capecitabine maintenance for duration of 5-FU shortage.     Discussed at colorectal liver mets tumor board 3/16/23.  Plan for repeat Y-90 and then consideration of surgical resection and he will meet with liver transplant team as well.  Underwent Y-90 delivery 5/17/23 with IR.     Has been on maintenance Xeloda since late April 2023.    Met with liver transplant team but ultimately opted not to proceed with transplant as he was concerned about how  he would tolerate a major surgery with the life changes that would come after transplant as well. They closed out his case.    He met with Dr. Rodriguez to discuss whether surgical resection is indicated for his liver mets.  He recommended repeat MRI.  Repeat MRI unfortunately showed disease progression while on Xeloda maintenance.  Similarly his CEA garrett precipitously, all in keeping with worsening disease.    We recommended we restart IV chemotherapy with FOLFIRI + Avastin (never had irinotecan).    Because of hyperbilirubinemia he received cycle 1 with just 5-FU + Avastin on 7/11/23. Tolerated this well. Bilirubin has improved.  Received irinotecan starting with cycle 2.  Repeat CT CAP after cycle 4 shows good response to therapy.   Excellent tolerance. [facility] tumor board agrees with continuation of chemotherapy.   Repeat CT CAP after cycle 7 shows stable disease, no evidence of new or worsening disease.   Repeat CT CAP after cycle 11 shows stable disease.   Repeat CT CAP after cycle 15 shows improved disease.    Doing well today. Tolerating treatment well.   Labs reviewed and adequate for treatment.   Will proceed with cycle 21 today.   Repeat imaging after this cycle in 2 weeks.     -RTC in 2 weeks for next cycle with lab work, UA, CT CAP and treatment discussion with Dr. Schuster.     Tempus: APC, CKS1B cng, ERCC3 cnl, PIK3CA; KENIA, TMB 12.1.    4. Hypertension   -- BP well controlled today. Feels well.   -- Following with PCP.   -- encouraged him and his daughter to monitor BP and HR closely at home.     5. MARIA A  -- Resolved   -- Monitor. Encouraged continued hydration particularly with working outside.     6. Anemia  -- Hgb stable. Monitor.  -- No signs of bleeding. Platelets normal.     7-10. Neoplasm related pain, weight loss, constipation  -- Pain very well controlled. Has oxycodone 5mg to use PRN but not requiring now.  -- Following with nutrition. Encouraged increased protein. Monitor.  -- Continue Miralax  daily for constipation.    11, Urinary Hesitancy.  -- Continue Flomax. Re-fill given today  -- Reports improvement since starting medication.     12. Hyperkalemia  - Kayexalate x 1. Advised to avoid foods high in potassium.     Patient is in agreement with the proposed treatment plan. All questions were answered to the patient's satisfaction. Pt knows to call clinic if anything is needed before the next clinic visit.      FAN Meier, PA-C  Physician Assistant Certified  Dept of Hematology/Oncology  PA-C to Dr. Mueller, Dr. Schuster and Dr. Castellon     Route Chart for Scheduling    Med Onc Chart Routing  Urgent    Follow up with physician 2 weeks and 4 weeks. See Dr. Schuster in 2 weeks with lab work, CT scan and treatment discussion. Please schedule patient with Dr. Schuster in 4 weeks as well.   Follow up with RACHEL 6 weeks. See RACHEL in 6 weeks with lab work and chemotherapy   Infusion scheduling note    Injection scheduling note    Labs CBC, CMP, CEA and urinalysis   Scheduling:  Preferred lab:  Lab interval: every 2 weeks     Imaging CT chest abdomen pelvis   Scheduled. Thank you   Pharmacy appointment    Other referrals

## 2024-04-17 ENCOUNTER — INFUSION (OUTPATIENT)
Dept: INFUSION THERAPY | Facility: HOSPITAL | Age: 74
End: 2024-04-17
Payer: MEDICARE

## 2024-04-17 VITALS
RESPIRATION RATE: 18 BRPM | SYSTOLIC BLOOD PRESSURE: 123 MMHG | HEART RATE: 75 BPM | DIASTOLIC BLOOD PRESSURE: 74 MMHG | TEMPERATURE: 99 F | OXYGEN SATURATION: 100 %

## 2024-04-17 DIAGNOSIS — C18.9 METASTATIC COLON CANCER TO LIVER: Primary | ICD-10-CM

## 2024-04-17 DIAGNOSIS — C78.7 METASTATIC COLON CANCER TO LIVER: Primary | ICD-10-CM

## 2024-04-17 PROCEDURE — 63600175 PHARM REV CODE 636 W HCPCS: Performed by: PHYSICIAN ASSISTANT

## 2024-04-17 PROCEDURE — A4216 STERILE WATER/SALINE, 10 ML: HCPCS | Performed by: PHYSICIAN ASSISTANT

## 2024-04-17 PROCEDURE — 25000003 PHARM REV CODE 250: Performed by: PHYSICIAN ASSISTANT

## 2024-04-17 RX ORDER — HEPARIN 100 UNIT/ML
500 SYRINGE INTRAVENOUS
Status: DISCONTINUED | OUTPATIENT
Start: 2024-04-17 | End: 2024-04-17 | Stop reason: HOSPADM

## 2024-04-17 RX ORDER — SODIUM CHLORIDE 0.9 % (FLUSH) 0.9 %
10 SYRINGE (ML) INJECTION
Status: DISCONTINUED | OUTPATIENT
Start: 2024-04-17 | End: 2024-04-17 | Stop reason: HOSPADM

## 2024-04-17 RX ADMIN — Medication 10 ML: at 02:04

## 2024-04-17 RX ADMIN — HEPARIN 500 UNITS: 100 SYRINGE at 02:04

## 2024-04-17 NOTE — PLAN OF CARE
1416-Pt ambulatory to clinic today for pump d/c. Denies any complaints. Pump completed prior to arrival. Port deaccessed after flushing. Ambulatory from clinic in [facility].

## 2024-04-26 ENCOUNTER — HOSPITAL ENCOUNTER (OUTPATIENT)
Dept: RADIOLOGY | Facility: HOSPITAL | Age: 74
Discharge: HOME | End: 2024-04-26
Attending: PHYSICIAN ASSISTANT
Payer: MEDICARE

## 2024-04-26 DIAGNOSIS — C18.9 METASTATIC COLON CANCER TO LIVER: ICD-10-CM

## 2024-04-26 DIAGNOSIS — C78.7 METASTATIC COLON CANCER TO LIVER: ICD-10-CM

## 2024-04-26 PROCEDURE — 74177 CT ABD & PELVIS W/CONTRAST: CPT | Mod: 26,,, | Performed by: RADIOLOGY

## 2024-04-26 PROCEDURE — 74177 CT ABD & PELVIS W/CONTRAST: CPT | Mod: TC

## 2024-04-26 PROCEDURE — 71260 CT THORAX DX C+: CPT | Mod: 26,,, | Performed by: RADIOLOGY

## 2024-04-26 PROCEDURE — 25500020 PHARM REV CODE 255: Performed by: PHYSICIAN ASSISTANT

## 2024-04-26 PROCEDURE — A9698 NON-RAD CONTRAST MATERIALNOC: HCPCS | Performed by: PHYSICIAN ASSISTANT

## 2024-04-26 RX ADMIN — Medication 450 ML: at 03:04

## 2024-04-26 RX ADMIN — IOHEXOL 75 ML: 350 INJECTION, SOLUTION INTRAVENOUS at 03:04

## 2024-04-29 ENCOUNTER — INFUSION (OUTPATIENT)
Dept: INFUSION THERAPY | Facility: HOSPITAL | Age: 74
End: 2024-04-29
Payer: MEDICARE

## 2024-04-29 ENCOUNTER — OFFICE VISIT (OUTPATIENT)
Dept: HEMATOLOGY/ONCOLOGY | Facility: CLINIC | Age: 74
End: 2024-04-29
Payer: MEDICARE

## 2024-04-29 VITALS
TEMPERATURE: 98 F | OXYGEN SATURATION: 100 % | HEART RATE: 80 BPM | WEIGHT: 134.13 LBS | SYSTOLIC BLOOD PRESSURE: 108 MMHG | BODY MASS INDEX: 21.05 KG/M2 | HEIGHT: 67 IN | DIASTOLIC BLOOD PRESSURE: 69 MMHG

## 2024-04-29 VITALS
WEIGHT: 134.13 LBS | RESPIRATION RATE: 18 BRPM | BODY MASS INDEX: 21.05 KG/M2 | OXYGEN SATURATION: 100 % | HEIGHT: 67 IN | SYSTOLIC BLOOD PRESSURE: 138 MMHG | TEMPERATURE: 97 F | DIASTOLIC BLOOD PRESSURE: 81 MMHG | HEART RATE: 67 BPM

## 2024-04-29 DIAGNOSIS — K59.03 DRUG-INDUCED CONSTIPATION: ICD-10-CM

## 2024-04-29 DIAGNOSIS — E87.5 HYPERKALEMIA: ICD-10-CM

## 2024-04-29 DIAGNOSIS — R63.4 WEIGHT DECREASE: ICD-10-CM

## 2024-04-29 DIAGNOSIS — Z79.899 IMMUNODEFICIENCY DUE TO CHEMOTHERAPY: ICD-10-CM

## 2024-04-29 DIAGNOSIS — E43 SEVERE MALNUTRITION: ICD-10-CM

## 2024-04-29 DIAGNOSIS — C78.7 METASTATIC COLON CANCER TO LIVER: Primary | ICD-10-CM

## 2024-04-29 DIAGNOSIS — D84.81 IMMUNODEFICIENCY SECONDARY TO NEOPLASM: ICD-10-CM

## 2024-04-29 DIAGNOSIS — R52 PAIN: ICD-10-CM

## 2024-04-29 DIAGNOSIS — R39.9 LOWER URINARY TRACT SYMPTOMS (LUTS): ICD-10-CM

## 2024-04-29 DIAGNOSIS — T45.1X5A ANEMIA ASSOCIATED WITH CHEMOTHERAPY: ICD-10-CM

## 2024-04-29 DIAGNOSIS — C18.9 METASTATIC COLON CANCER TO LIVER: Primary | ICD-10-CM

## 2024-04-29 DIAGNOSIS — T45.1X5A IMMUNODEFICIENCY DUE TO CHEMOTHERAPY: ICD-10-CM

## 2024-04-29 DIAGNOSIS — D84.821 IMMUNODEFICIENCY DUE TO CHEMOTHERAPY: ICD-10-CM

## 2024-04-29 DIAGNOSIS — I10 HYPERTENSION, UNSPECIFIED TYPE: ICD-10-CM

## 2024-04-29 DIAGNOSIS — D64.81 ANEMIA ASSOCIATED WITH CHEMOTHERAPY: ICD-10-CM

## 2024-04-29 DIAGNOSIS — N17.9 AKI (ACUTE KIDNEY INJURY): ICD-10-CM

## 2024-04-29 DIAGNOSIS — D49.9 IMMUNODEFICIENCY SECONDARY TO NEOPLASM: ICD-10-CM

## 2024-04-29 PROCEDURE — 96413 CHEMO IV INFUSION 1 HR: CPT

## 2024-04-29 PROCEDURE — 3074F SYST BP LT 130 MM HG: CPT | Mod: CPTII,S$GLB,, | Performed by: INTERNAL MEDICINE

## 2024-04-29 PROCEDURE — 96375 TX/PRO/DX INJ NEW DRUG ADDON: CPT

## 2024-04-29 PROCEDURE — 99999 PR PBB SHADOW E&M-EST. PATIENT-LVL III: CPT | Mod: PBBFAC,,, | Performed by: INTERNAL MEDICINE

## 2024-04-29 PROCEDURE — 1160F RVW MEDS BY RX/DR IN RCRD: CPT | Mod: CPTII,S$GLB,, | Performed by: INTERNAL MEDICINE

## 2024-04-29 PROCEDURE — 3078F DIAST BP <80 MM HG: CPT | Mod: CPTII,S$GLB,, | Performed by: INTERNAL MEDICINE

## 2024-04-29 PROCEDURE — G2211 COMPLEX E/M VISIT ADD ON: HCPCS | Mod: S$GLB,,, | Performed by: INTERNAL MEDICINE

## 2024-04-29 PROCEDURE — 3288F FALL RISK ASSESSMENT DOCD: CPT | Mod: CPTII,S$GLB,, | Performed by: INTERNAL MEDICINE

## 2024-04-29 PROCEDURE — 25000003 PHARM REV CODE 250: Performed by: REGISTERED NURSE

## 2024-04-29 PROCEDURE — 1126F AMNT PAIN NOTED NONE PRSNT: CPT | Mod: CPTII,S$GLB,, | Performed by: INTERNAL MEDICINE

## 2024-04-29 PROCEDURE — 63600175 PHARM REV CODE 636 W HCPCS: Performed by: REGISTERED NURSE

## 2024-04-29 PROCEDURE — 96367 TX/PROPH/DG ADDL SEQ IV INF: CPT

## 2024-04-29 PROCEDURE — 96417 CHEMO IV INFUS EACH ADDL SEQ: CPT

## 2024-04-29 PROCEDURE — 96416 CHEMO PROLONG INFUSE W/PUMP: CPT

## 2024-04-29 PROCEDURE — 1159F MED LIST DOCD IN RCRD: CPT | Mod: CPTII,S$GLB,, | Performed by: INTERNAL MEDICINE

## 2024-04-29 PROCEDURE — 3008F BODY MASS INDEX DOCD: CPT | Mod: CPTII,S$GLB,, | Performed by: INTERNAL MEDICINE

## 2024-04-29 PROCEDURE — 96415 CHEMO IV INFUSION ADDL HR: CPT

## 2024-04-29 PROCEDURE — 1101F PT FALLS ASSESS-DOCD LE1/YR: CPT | Mod: CPTII,S$GLB,, | Performed by: INTERNAL MEDICINE

## 2024-04-29 PROCEDURE — 99215 OFFICE O/P EST HI 40 MIN: CPT | Mod: S$GLB,,, | Performed by: INTERNAL MEDICINE

## 2024-04-29 RX ORDER — HEPARIN 100 UNIT/ML
500 SYRINGE INTRAVENOUS
Status: CANCELLED | OUTPATIENT
Start: 2024-05-03

## 2024-04-29 RX ORDER — DIPHENHYDRAMINE HYDROCHLORIDE 50 MG/ML
50 INJECTION INTRAMUSCULAR; INTRAVENOUS ONCE AS NEEDED
Status: CANCELLED | OUTPATIENT
Start: 2024-05-01

## 2024-04-29 RX ORDER — EPINEPHRINE 0.3 MG/.3ML
0.3 INJECTION SUBCUTANEOUS ONCE AS NEEDED
Status: CANCELLED | OUTPATIENT
Start: 2024-05-01

## 2024-04-29 RX ORDER — SODIUM CHLORIDE 0.9 % (FLUSH) 0.9 %
10 SYRINGE (ML) INJECTION
Status: CANCELLED | OUTPATIENT
Start: 2024-05-01

## 2024-04-29 RX ORDER — AMLODIPINE BESYLATE 10 MG/1
10 TABLET ORAL DAILY
Qty: 90 TABLET | Refills: 3 | Status: SHIPPED | OUTPATIENT
Start: 2024-04-29

## 2024-04-29 RX ORDER — DIPHENHYDRAMINE HYDROCHLORIDE 50 MG/ML
50 INJECTION INTRAMUSCULAR; INTRAVENOUS ONCE AS NEEDED
Status: DISCONTINUED | OUTPATIENT
Start: 2024-04-29 | End: 2024-04-29 | Stop reason: HOSPADM

## 2024-04-29 RX ORDER — ATROPINE SULFATE 0.4 MG/ML
0.4 INJECTION, SOLUTION ENDOTRACHEAL; INTRAMEDULLARY; INTRAMUSCULAR; INTRAVENOUS; SUBCUTANEOUS ONCE AS NEEDED
Status: COMPLETED | OUTPATIENT
Start: 2024-04-29 | End: 2024-04-29

## 2024-04-29 RX ORDER — SODIUM CHLORIDE 0.9 % (FLUSH) 0.9 %
10 SYRINGE (ML) INJECTION
Status: CANCELLED | OUTPATIENT
Start: 2024-05-03

## 2024-04-29 RX ORDER — HEPARIN 100 UNIT/ML
500 SYRINGE INTRAVENOUS
Status: DISCONTINUED | OUTPATIENT
Start: 2024-04-29 | End: 2024-04-29 | Stop reason: HOSPADM

## 2024-04-29 RX ORDER — EPINEPHRINE 0.3 MG/.3ML
0.3 INJECTION SUBCUTANEOUS ONCE AS NEEDED
Status: DISCONTINUED | OUTPATIENT
Start: 2024-04-29 | End: 2024-04-29 | Stop reason: HOSPADM

## 2024-04-29 RX ORDER — PROCHLORPERAZINE EDISYLATE 5 MG/ML
5 INJECTION INTRAMUSCULAR; INTRAVENOUS ONCE AS NEEDED
Status: DISCONTINUED | OUTPATIENT
Start: 2024-04-29 | End: 2024-04-29 | Stop reason: HOSPADM

## 2024-04-29 RX ORDER — HEPARIN 100 UNIT/ML
500 SYRINGE INTRAVENOUS
Status: CANCELLED | OUTPATIENT
Start: 2024-05-01

## 2024-04-29 RX ORDER — PROCHLORPERAZINE EDISYLATE 5 MG/ML
5 INJECTION INTRAMUSCULAR; INTRAVENOUS ONCE AS NEEDED
Status: CANCELLED
Start: 2024-05-01

## 2024-04-29 RX ORDER — SODIUM CHLORIDE 0.9 % (FLUSH) 0.9 %
10 SYRINGE (ML) INJECTION
Status: DISCONTINUED | OUTPATIENT
Start: 2024-04-29 | End: 2024-04-29 | Stop reason: HOSPADM

## 2024-04-29 RX ORDER — ATROPINE SULFATE 0.4 MG/ML
0.4 INJECTION, SOLUTION ENDOTRACHEAL; INTRAMEDULLARY; INTRAMUSCULAR; INTRAVENOUS; SUBCUTANEOUS ONCE AS NEEDED
Status: CANCELLED | OUTPATIENT
Start: 2024-05-01

## 2024-04-29 RX ADMIN — SODIUM CHLORIDE: 9 INJECTION, SOLUTION INTRAVENOUS at 09:04

## 2024-04-29 RX ADMIN — ATROPINE SULFATE 0.4 MG: 0.4 INJECTION, SOLUTION INTRAVENOUS at 11:04

## 2024-04-29 RX ADMIN — DEXAMETHASONE SODIUM PHOSPHATE 0.25 MG: 4 INJECTION, SOLUTION INTRA-ARTICULAR; INTRALESIONAL; INTRAMUSCULAR; INTRAVENOUS; SOFT TISSUE at 11:04

## 2024-04-29 RX ADMIN — BEVACIZUMAB-AWWB 300 MG: 100 INJECTION, SOLUTION INTRAVENOUS at 10:04

## 2024-04-29 RX ADMIN — IRINOTECAN HYDROCHLORIDE 240 MG: 20 INJECTION, SOLUTION INTRAVENOUS at 11:04

## 2024-04-29 RX ADMIN — FLUOROURACIL 3695 MG: 50 INJECTION, SOLUTION INTRAVENOUS at 01:04

## 2024-04-29 NOTE — PLAN OF CARE
1330  Patient completed MVASI & irinotecan, tolerated well.   CADD pump connected to port to infuse @ 2.2cc/hr for 46 hours for a total of 100 cc of 5FU.  CADD infusing prior to patioetn ambulating off floor accompanied by daughter .  Patient aware to RTC @ 1130 on Wed. 5/1/24 for pump DC>

## 2024-04-29 NOTE — PROGRESS NOTES
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 Bonilla  12/30/2022: Y-90 to R liver  1/27/2023: Y-90 to R liver  5/17/2023: Y-90 to R liver  7/11/2023: C1 FOLFIRI + Pema  7/26/2023: C2 FOLFIRI + Pema  8/8/2023: C3 FOLFIRI + Pema  8/22/23: C4 FOLFIRI + Pema  Restaging CT CAP 9/1/23: Good response to chemo.  9/7/23: C5 FOLFIRI + Pema  ......................................  Restaging CT CAP 10/12/23: Good response to chemo  10/16/23: C8 FOLFIRI + Pema  .......................................  Restaging CT CAP 12/8/23: Good response to chemo  12/11/23: C12 FOLFIRI + Pema  .......................................  Restaging CT CAP 2/5/24: Good response to chemo  2/8/24: C16 FOLFIRI + Pema  .......................................  Restaging CT CAP 4/26/24: Good response to chemo. No new or worsening disease.   4/29/24: C22 FOLFIRI + Pema    Interval History:   He presents today with his daughter prior to cycle 22 of FOLFIRI plus avastin. Doing well overall with no new complaints or concerns. Denies pain or bleeding.     ECOG status is 1. Presents with his daughter today.    Past Medical History:   Past Medical History:   Diagnosis Date    MARIA A (acute kidney injury) 8/18/2022    Hypertension     Metastatic colon cancer to liver 4/22/2021     Past Surgical HIstory:   Past  Surgical History:   Procedure Laterality Date    COLONOSCOPY N/A 2021    Procedure: COLONOSCOPY with possible stent;  Surgeon: EDILMA Bonilla MD;  Location: [facility] ENDO (2ND FLR);  Service: Colon and Rectal;  Laterality: N/A;    COLONOSCOPY N/A 11/3/2023    Procedure: COLONOSCOPY;  Surgeon: EDILMA Bonilla MD;  Location: [facility] ENDO (4TH FLR);  Service: Endoscopy;  Laterality: N/A;  prep instructions sent to pt via portal  From: EDILMA Bonilla MD  Procedure: Colonoscopy  Diagnosis: Surveillance colonoscopy - Hx of colon cancer  Procedure Timin-12 weeks  Provider: Myself  Location: [facility] [place] 4Endo  Additional Scheduling Information: No scheduling con    DIAGNOSTIC LAPAROSCOPY N/A 2022    Procedure: LAPAROSCOPY, DIAGNOSTIC;  Surgeon: Adrian Rodriguez MD;  Location: [facility] OR 2ND FLR;  Service: General;  Laterality: N/A;    INSERTION OF TUNNELED CENTRAL VENOUS CATHETER (CVC) WITH SUBCUTANEOUS PORT N/A 5/3/2021    Procedure: JYFOSHGXC-BTCA-R-CATH;  Surgeon: Francisco Layton MD;  Location: [facility] OR 2ND FLR;  Service: Vascular;  Laterality: N/A;    OMENTECTOMY N/A 10/20/2022    Procedure: OMENTECTOMY;  Surgeon: EDILMA Bonilla MD;  Location: [facility] OR 2ND FLR;  Service: Colon and Rectal;  Laterality: N/A;    RIGHT HEMICOLECTOMY N/A 10/20/2022    Procedure: HEMICOLECTOMY, RIGHT, extended;  Surgeon: EDILMA Bonilla MD;  [facility];  Service: Colon and Rectal;  Laterality: N/A;  Extended right hemicolectomy CONSENT IN AM     Family History:   Family History   Problem Relation Name Age of Onset    Cancer Brother       Social History:  reports that he has never smoked. He has never used smokeless tobacco. He reports that he does not currently use alcohol. He reports that he does not use drugs.    Allergies:  Review of patient's allergies indicates:  No Known Allergies    Medications:  Current Outpatient Medications   Medication Sig Dispense Refill    LIDOcaine-prilocaine (EMLA) cream Apply topically as  needed (port application). 30 g 3    ondansetron (ZOFRAN) 4 MG tablet Take 1 tablet (4 mg total) by mouth every 8 (eight) hours as needed for Nausea. 30 tablet 3    tamsulosin (FLOMAX) 0.4 mg Cap Take 1 capsule (0.4 mg total) by mouth once daily. 30 capsule 5    acetaminophen (TYLENOL) 500 MG tablet Take 1 tablet (500 mg total) by mouth every 6 (six) hours as needed for Pain (alternate with ibuprofen). (Patient not taking: Reported on 12/26/2023)  0    amLODIPine (NORVASC) 10 MG tablet Take 1 tablet (10 mg total) by mouth once daily. 90 tablet 3    ibuprofen (ADVIL,MOTRIN) 400 MG tablet Take 1 tablet (400 mg total) by mouth every 6 (six) hours as needed for Other (pain). Alternate with tylenol (Patient not taking: Reported on 12/26/2023)      oxyCODONE (ROXICODONE) 5 MG immediate release tablet Take 1 tablet (5 mg total) by mouth every 6 (six) hours as needed for Pain. (Patient not taking: Reported on 12/26/2023) 20 tablet 0     No current facility-administered medications for this visit.     Review of Systems   Constitutional:  Negative for activity change, appetite change, chills, diaphoresis, fatigue, fever and unexpected weight change.   HENT:  Negative for congestion, mouth sores, nosebleeds, postnasal drip, rhinorrhea, trouble swallowing and voice change.    Eyes:  Negative for pain and visual disturbance.   Respiratory:  Negative for cough, chest tightness, shortness of breath and wheezing.    Cardiovascular:  Negative for chest pain, palpitations and leg swelling.   Gastrointestinal:  Negative for abdominal distention, abdominal pain, blood in stool, constipation, diarrhea, nausea and vomiting.   Genitourinary:  Negative for difficulty urinating, dysuria, flank pain, frequency, hematuria and urgency.   Musculoskeletal:  Negative for arthralgias, back pain and myalgias.   Skin:  Negative for rash and wound.   Neurological:  Negative for dizziness, facial asymmetry, weakness, light-headedness, numbness and  "headaches.   Psychiatric/Behavioral:  Negative for agitation, behavioral problems, confusion, decreased concentration, dysphoric mood and sleep disturbance. The patient is not nervous/anxious.    All other systems reviewed and are negative.    ECOG Performance Status: 1     Objective:      Vitals:   Vitals:    04/29/24 0831   BP: 108/69   BP Location: Left arm   Patient Position: Sitting   BP Method: Medium (Automatic)   Pulse: 80   Temp: 98.1 °F (36.7 °C)   TempSrc: Oral   SpO2: 100%   Weight: 60.9 kg (134 lb 2.4 oz)   Height: 5' 7" (1.702 m)       Physical Exam  Vitals reviewed.   Constitutional:       General: He is not in acute distress.     Appearance: Normal appearance. He is not ill-appearing, toxic-appearing or diaphoretic.   HENT:      Head: Normocephalic and atraumatic.      Right Ear: External ear normal.      Left Ear: External ear normal.      Nose: Nose normal.      Mouth/Throat:      Pharynx: Oropharynx is clear.   Eyes:      General: No scleral icterus.     Extraocular Movements: Extraocular movements intact.      Conjunctiva/sclera: Conjunctivae normal.      Pupils: Pupils are equal, round, and reactive to light.   Cardiovascular:      Rate and Rhythm: Normal rate and regular rhythm.      Pulses: Normal pulses.      Heart sounds: Normal heart sounds. No murmur heard.  Pulmonary:      Effort: Pulmonary effort is normal. No respiratory distress.      Breath sounds: Normal breath sounds. No wheezing.   Chest:      Comments: Port to RCW, no signs of infection.  Abdominal:      General: Abdomen is flat. Bowel sounds are normal. There is no distension.      Palpations: Abdomen is soft. There is no mass.      Tenderness: There is no abdominal tenderness.      Comments: Vertical midline abdominal wound well healed   Musculoskeletal:         General: No swelling or deformity. Normal range of motion.      Right lower leg: No edema.      Left lower leg: No edema.   Skin:     Coloration: Skin is not jaundiced " or pale.      Findings: No bruising, erythema or rash.   Neurological:      General: No focal deficit present.      Mental Status: He is alert and oriented to person, place, and time. Mental status is at baseline.      Cranial Nerves: No cranial nerve deficit.      Sensory: No sensory deficit.      Gait: Gait normal.   Psychiatric:         Mood and Affect: Mood normal.         Behavior: Behavior normal.         Thought Content: Thought content normal.         Judgment: Judgment normal.     Laboratory Data:  Lab Visit on 04/29/2024   Component Date Value Ref Range Status    WBC 04/29/2024 3.41 (L)  3.90 - 12.70 K/uL Final    RBC 04/29/2024 3.99 (L)  4.60 - 6.20 M/uL Final    Hemoglobin 04/29/2024 10.1 (L)  14.0 - 18.0 g/dL Final    Hematocrit 04/29/2024 34.8 (L)  40.0 - 54.0 % Final    MCV 04/29/2024 87  82 - 98 fL Final    MCH 04/29/2024 25.3 (L)  27.0 - 31.0 pg Final    MCHC 04/29/2024 29.0 (L)  32.0 - 36.0 g/dL Final    RDW 04/29/2024 18.6 (H)  11.5 - 14.5 % Final    Platelets 04/29/2024 172  150 - 450 K/uL Final    MPV 04/29/2024 10.2  9.2 - 12.9 fL Final    Gran # (ANC) 04/29/2024 2.2  1.8 - 7.7 K/uL Final    Comment: The ANC is based on a white cell differential from an   automated cell counter. It has not been microscopically   reviewed for the presence of abnormal cells. Clinical   correlation is required.      Immature Grans (Abs) 04/29/2024 0.01  0.00 - 0.04 K/uL Final    Comment: Mild elevation in immature granulocytes is non specific and   can be seen in a variety of conditions including stress response,   acute inflammation, trauma and pregnancy. Correlation with other   laboratory and clinical findings is essential.       Assessment and Plan        1. Metastatic colon cancer to liver    2. Immunodeficiency secondary to neoplasm    3. Immunodeficiency due to chemotherapy    4. Hypertension, unspecified type    5. MARIA A (acute kidney injury)    6. Anemia associated with chemotherapy    7. Pain    8.  Drug-induced constipation    9. Severe malnutrition    10. Weight decrease    11. Lower urinary tract symptoms (LUTS)    12. Hyperkalemia        1-3.  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  Guardant 360 was negative for BRAF, VIRI, HER2 amplification.   Pretreatment CEA 57.  We had a long and sonia discussion with him about his diagnosis. Unfortunately, the disease is not curable but remains treatable and he has good performance status.   Restaging scans after 7 cycles of FOLFOX + Pema showed significant reduction in colonic mass and liver mass.   we switched to maintenance as of cycle 8 with 5FU + Pema  Restaging scans from March 2022 show stable disease.   MRI on 7/18/22 showing hepatic progression of disease in the right hepatic lobe noted on the exam. CT CAP on 8/26 shows some mild progression in both liver metastases.    Discussed case at colorectal tumor board 8/31/22 and had a diagnostic lap performed by Dr. Rodriguez on 9/7 to assess for any occult peritoneal disease. Findings from the diagnostic lap were negative. Fluid from around from around the primary mass was sent for cytology that was negative for malignancy.   Underwent primary tumor resection on 10/20/22 with Dr. Bonilla.    Meanwhile his liver mets had grown.  We discussed his case at CRC conference with plans for short term Y-90 and then restarting chemotherapy prior to considering any surgical options to his liver metastases.  He underwent Y-90 delivery on 12/30/22. Tolerated well.   CT CAP on 1/23/23 reviewed at CRC conference with good response to Y-90, no new lesions.  Underwent second Y-90 on 1/27/23. Can consider R hepatectomy pending follow-up imaging after Y-90.     Received cycle 42 of FOLFOX (omitted oxaliplatin again starting cycle 41 due to neuropathy) on 2/9/23.  Because of 5-FU shortage nationally, we have been holding chemotherapy since mid-February 2023.  If he needs to restart chemotherapy  (I.e. no plans for surgical resection), will place him on capecitabine maintenance for duration of 5-FU shortage.     Discussed at colorectal liver mets tumor board 3/16/23.  Plan for repeat Y-90 and then consideration of surgical resection and he will meet with liver transplant team as well.  Underwent Y-90 delivery 5/17/23 with IR.     Has been on maintenance Xeloda since late April 2023.    Met with liver transplant team but ultimately opted not to proceed with transplant as he was concerned about how he would tolerate a major surgery with the life changes that would come after transplant as well. They closed out his case.    He met with Dr. Rodriguez to discuss whether surgical resection is indicated for his liver mets.  He recommended repeat MRI.  Repeat MRI unfortunately showed disease progression while on Xeloda maintenance.  Similarly his CEA garrett precipitously, all in keeping with worsening disease.    We recommended we restart IV chemotherapy with FOLFIRI + Avastin (never had irinotecan).    Because of hyperbilirubinemia he received cycle 1 with just 5-FU + Avastin on 7/11/23. Tolerated this well. Bilirubin has improved.  Received irinotecan starting with cycle 2.  Repeat CT CAP after cycle 4 shows good response to therapy.   Excellent tolerance. mCRC tumor board agrees with continuation of chemotherapy.   Repeat CT CAP after cycle 7 shows stable disease, no evidence of new or worsening disease.   Repeat CT CAP after cycle 11 shows stable disease.   Repeat CT CAP after cycle 15 shows improved disease.  Repeat CT CAP after cycle 21 shows stable disease.     Doing well today. Tolerating treatment well.   Labs reviewed and adequate for treatment.   Will proceed with cycle 22 today.   Repeat imaging after cycle 25.      -RTC in 2 weeks for next cycle with lab work, UA and chemotherapy.     Tempus: APC, CKS1B cng, ERCC3 cnl, PIK3CA; KENIA, TMB 12.1.    4. Hypertension   -- BP well controlled/low normal today. Feels  well.   -- Following with PCP.   -- encouraged him and his daughter to monitor BP and HR closely at home.     5. MARIA A  -- Resolved   -- Monitor. Encouraged continued hydration particularly with working outside.     6. Anemia  -- Hgb stable. Monitor.  -- No signs of bleeding. Platelets normal.     7-10. Neoplasm related pain, weight loss, constipation  -- Pain very well controlled. Has oxycodone 5mg to use PRN but not requiring now.  -- Following with nutrition. Encouraged increased protein. Weight slowly increasing. Monitor.  -- Continue Miralax daily for constipation.    11, Urinary Hesitancy.  -- Continue Flomax.  -- Reports improvement since starting medication.     12. Hyperkalemia  - Advised to avoid foods high in potassium  - Has kayexalate but did not take. Labs pending today. Will let him know if he needs to take this once labs finalized.     Patient is in agreement with the proposed treatment plan. All questions were answered to the patient's satisfaction. Pt knows to call clinic if anything is needed before the next clinic visit.    Patient discussed with and seen in conjunction with collaborating physician, Dr. Schuster.    At least 40 minutes were spent today on this encounter including face to face time with the patient, data gathering/interpretation and documentation.       Natividad Maher, MSN, APRN, ACCNS-AG  Hematology and Medical Oncology  Clinical Nurse Specialist to Dr. Schuster, Dr. Quijano & Dr. Lancaster Chart for Scheduling    Med Onc Chart Routing      Follow up with physician . 8 weeks with labs and scans done 1-2 days prior then get infusion   Follow up with RACHEL 6 weeks. with labs and infusion   Infusion scheduling note   infusion every 2 weeks, pump d/c on day 3   Injection scheduling note    Labs CBC, CMP, CEA and urinalysis   Scheduling:  Preferred lab:  Lab interval: every 2 weeks     Imaging    Pharmacy appointment    Other referrals

## 2024-04-29 NOTE — PLAN OF CARE
Patient seated in chair accompanied by daughter, VSS, assessment done.  Port accessed, flushed, blood return noted, started NS@ 25 cc/hr KVO while waiting for MVASI, Irinotecan, 5FU from pharmacy.  WCTM for safety

## 2024-05-01 ENCOUNTER — INFUSION (OUTPATIENT)
Dept: INFUSION THERAPY | Facility: HOSPITAL | Age: 74
End: 2024-05-01
Payer: MEDICARE

## 2024-05-01 VITALS
DIASTOLIC BLOOD PRESSURE: 76 MMHG | HEART RATE: 99 BPM | RESPIRATION RATE: 18 BRPM | SYSTOLIC BLOOD PRESSURE: 124 MMHG | TEMPERATURE: 98 F

## 2024-05-01 DIAGNOSIS — C18.9 METASTATIC COLON CANCER TO LIVER: Primary | ICD-10-CM

## 2024-05-01 DIAGNOSIS — C78.7 METASTATIC COLON CANCER TO LIVER: Primary | ICD-10-CM

## 2024-05-01 PROCEDURE — 25000003 PHARM REV CODE 250: Performed by: REGISTERED NURSE

## 2024-05-01 PROCEDURE — A4216 STERILE WATER/SALINE, 10 ML: HCPCS | Performed by: REGISTERED NURSE

## 2024-05-01 PROCEDURE — 63600175 PHARM REV CODE 636 W HCPCS: Performed by: REGISTERED NURSE

## 2024-05-01 RX ORDER — SODIUM CHLORIDE 0.9 % (FLUSH) 0.9 %
10 SYRINGE (ML) INJECTION
Status: DISCONTINUED | OUTPATIENT
Start: 2024-05-01 | End: 2024-05-01 | Stop reason: HOSPADM

## 2024-05-01 RX ORDER — HEPARIN 100 UNIT/ML
500 SYRINGE INTRAVENOUS
Status: DISCONTINUED | OUTPATIENT
Start: 2024-05-01 | End: 2024-05-01 | Stop reason: HOSPADM

## 2024-05-01 RX ADMIN — HEPARIN 500 UNITS: 100 SYRINGE at 11:05

## 2024-05-01 RX ADMIN — Medication 10 ML: at 11:05

## 2024-05-01 NOTE — PLAN OF CARE
1130 pt here for pump d/c, infusion complete, port flushed per policy and deaccessed without issue, pt to rtc 5/13/24, no distress noted upon d/c to home with daughter

## 2024-05-13 ENCOUNTER — INFUSION (OUTPATIENT)
Dept: INFUSION THERAPY | Facility: HOSPITAL | Age: 74
End: 2024-05-13
Payer: MEDICARE

## 2024-05-13 ENCOUNTER — OFFICE VISIT (OUTPATIENT)
Dept: HEMATOLOGY/ONCOLOGY | Facility: CLINIC | Age: 74
End: 2024-05-13
Payer: MEDICARE

## 2024-05-13 VITALS
DIASTOLIC BLOOD PRESSURE: 80 MMHG | WEIGHT: 133.81 LBS | RESPIRATION RATE: 16 BRPM | OXYGEN SATURATION: 100 % | HEART RATE: 68 BPM | TEMPERATURE: 98 F | SYSTOLIC BLOOD PRESSURE: 117 MMHG | BODY MASS INDEX: 21 KG/M2 | HEIGHT: 67 IN

## 2024-05-13 VITALS
SYSTOLIC BLOOD PRESSURE: 123 MMHG | DIASTOLIC BLOOD PRESSURE: 72 MMHG | HEART RATE: 66 BPM | RESPIRATION RATE: 17 BRPM | OXYGEN SATURATION: 100 % | TEMPERATURE: 98 F

## 2024-05-13 DIAGNOSIS — D64.81 ANEMIA ASSOCIATED WITH CHEMOTHERAPY: ICD-10-CM

## 2024-05-13 DIAGNOSIS — N17.9 AKI (ACUTE KIDNEY INJURY): ICD-10-CM

## 2024-05-13 DIAGNOSIS — E87.5 HYPERKALEMIA: ICD-10-CM

## 2024-05-13 DIAGNOSIS — C78.7 METASTATIC COLON CANCER TO LIVER: Primary | ICD-10-CM

## 2024-05-13 DIAGNOSIS — C18.9 METASTATIC COLON CANCER TO LIVER: Primary | ICD-10-CM

## 2024-05-13 DIAGNOSIS — R52 PAIN: ICD-10-CM

## 2024-05-13 DIAGNOSIS — D49.9 IMMUNODEFICIENCY SECONDARY TO NEOPLASM: ICD-10-CM

## 2024-05-13 DIAGNOSIS — T45.1X5A IMMUNODEFICIENCY DUE TO CHEMOTHERAPY: ICD-10-CM

## 2024-05-13 DIAGNOSIS — D84.821 IMMUNODEFICIENCY DUE TO CHEMOTHERAPY: ICD-10-CM

## 2024-05-13 DIAGNOSIS — D84.81 IMMUNODEFICIENCY SECONDARY TO NEOPLASM: ICD-10-CM

## 2024-05-13 DIAGNOSIS — E43 SEVERE MALNUTRITION: ICD-10-CM

## 2024-05-13 DIAGNOSIS — T45.1X5A ANEMIA ASSOCIATED WITH CHEMOTHERAPY: ICD-10-CM

## 2024-05-13 DIAGNOSIS — R63.4 WEIGHT DECREASE: ICD-10-CM

## 2024-05-13 DIAGNOSIS — I10 HYPERTENSION, UNSPECIFIED TYPE: ICD-10-CM

## 2024-05-13 DIAGNOSIS — R39.9 LOWER URINARY TRACT SYMPTOMS (LUTS): ICD-10-CM

## 2024-05-13 DIAGNOSIS — Z79.899 IMMUNODEFICIENCY DUE TO CHEMOTHERAPY: ICD-10-CM

## 2024-05-13 DIAGNOSIS — K59.03 DRUG-INDUCED CONSTIPATION: ICD-10-CM

## 2024-05-13 PROCEDURE — 63600175 PHARM REV CODE 636 W HCPCS: Performed by: PHYSICIAN ASSISTANT

## 2024-05-13 PROCEDURE — 1101F PT FALLS ASSESS-DOCD LE1/YR: CPT | Mod: CPTII,S$GLB,, | Performed by: PHYSICIAN ASSISTANT

## 2024-05-13 PROCEDURE — 3074F SYST BP LT 130 MM HG: CPT | Mod: CPTII,S$GLB,, | Performed by: PHYSICIAN ASSISTANT

## 2024-05-13 PROCEDURE — 3288F FALL RISK ASSESSMENT DOCD: CPT | Mod: CPTII,S$GLB,, | Performed by: PHYSICIAN ASSISTANT

## 2024-05-13 PROCEDURE — 1126F AMNT PAIN NOTED NONE PRSNT: CPT | Mod: CPTII,S$GLB,, | Performed by: PHYSICIAN ASSISTANT

## 2024-05-13 PROCEDURE — 96416 CHEMO PROLONG INFUSE W/PUMP: CPT

## 2024-05-13 PROCEDURE — 96413 CHEMO IV INFUSION 1 HR: CPT

## 2024-05-13 PROCEDURE — 1160F RVW MEDS BY RX/DR IN RCRD: CPT | Mod: CPTII,S$GLB,, | Performed by: PHYSICIAN ASSISTANT

## 2024-05-13 PROCEDURE — 96367 TX/PROPH/DG ADDL SEQ IV INF: CPT

## 2024-05-13 PROCEDURE — 1159F MED LIST DOCD IN RCRD: CPT | Mod: CPTII,S$GLB,, | Performed by: PHYSICIAN ASSISTANT

## 2024-05-13 PROCEDURE — 96417 CHEMO IV INFUS EACH ADDL SEQ: CPT

## 2024-05-13 PROCEDURE — 3008F BODY MASS INDEX DOCD: CPT | Mod: CPTII,S$GLB,, | Performed by: PHYSICIAN ASSISTANT

## 2024-05-13 PROCEDURE — G2211 COMPLEX E/M VISIT ADD ON: HCPCS | Mod: S$GLB,,, | Performed by: PHYSICIAN ASSISTANT

## 2024-05-13 PROCEDURE — 99999 PR PBB SHADOW E&M-EST. PATIENT-LVL III: CPT | Mod: PBBFAC,,, | Performed by: PHYSICIAN ASSISTANT

## 2024-05-13 PROCEDURE — 3079F DIAST BP 80-89 MM HG: CPT | Mod: CPTII,S$GLB,, | Performed by: PHYSICIAN ASSISTANT

## 2024-05-13 PROCEDURE — 99215 OFFICE O/P EST HI 40 MIN: CPT | Mod: S$GLB,,, | Performed by: PHYSICIAN ASSISTANT

## 2024-05-13 PROCEDURE — 25000003 PHARM REV CODE 250: Performed by: PHYSICIAN ASSISTANT

## 2024-05-13 RX ORDER — SODIUM CHLORIDE 0.9 % (FLUSH) 0.9 %
10 SYRINGE (ML) INJECTION
Status: CANCELLED | OUTPATIENT
Start: 2024-05-15

## 2024-05-13 RX ORDER — PROCHLORPERAZINE EDISYLATE 5 MG/ML
5 INJECTION INTRAMUSCULAR; INTRAVENOUS ONCE AS NEEDED
Status: DISCONTINUED | OUTPATIENT
Start: 2024-05-13 | End: 2024-05-13 | Stop reason: HOSPADM

## 2024-05-13 RX ORDER — PROCHLORPERAZINE EDISYLATE 5 MG/ML
5 INJECTION INTRAMUSCULAR; INTRAVENOUS ONCE AS NEEDED
Status: CANCELLED
Start: 2024-05-15

## 2024-05-13 RX ORDER — HEPARIN 100 UNIT/ML
500 SYRINGE INTRAVENOUS
Status: CANCELLED | OUTPATIENT
Start: 2024-05-17

## 2024-05-13 RX ORDER — HEPARIN 100 UNIT/ML
500 SYRINGE INTRAVENOUS
Status: DISCONTINUED | OUTPATIENT
Start: 2024-05-13 | End: 2024-05-13 | Stop reason: HOSPADM

## 2024-05-13 RX ORDER — ATROPINE SULFATE 0.4 MG/ML
0.4 INJECTION, SOLUTION ENDOTRACHEAL; INTRAMEDULLARY; INTRAMUSCULAR; INTRAVENOUS; SUBCUTANEOUS ONCE AS NEEDED
Status: CANCELLED | OUTPATIENT
Start: 2024-05-15

## 2024-05-13 RX ORDER — ATROPINE SULFATE 0.4 MG/ML
0.4 INJECTION, SOLUTION ENDOTRACHEAL; INTRAMEDULLARY; INTRAMUSCULAR; INTRAVENOUS; SUBCUTANEOUS ONCE AS NEEDED
Status: DISCONTINUED | OUTPATIENT
Start: 2024-05-13 | End: 2024-05-13 | Stop reason: HOSPADM

## 2024-05-13 RX ORDER — DIPHENHYDRAMINE HYDROCHLORIDE 50 MG/ML
50 INJECTION INTRAMUSCULAR; INTRAVENOUS ONCE AS NEEDED
Status: DISCONTINUED | OUTPATIENT
Start: 2024-05-13 | End: 2024-05-13 | Stop reason: HOSPADM

## 2024-05-13 RX ORDER — HEPARIN 100 UNIT/ML
500 SYRINGE INTRAVENOUS
Status: CANCELLED | OUTPATIENT
Start: 2024-05-15

## 2024-05-13 RX ORDER — EPINEPHRINE 0.3 MG/.3ML
0.3 INJECTION SUBCUTANEOUS ONCE AS NEEDED
Status: CANCELLED | OUTPATIENT
Start: 2024-05-15

## 2024-05-13 RX ORDER — SODIUM CHLORIDE 0.9 % (FLUSH) 0.9 %
10 SYRINGE (ML) INJECTION
Status: CANCELLED | OUTPATIENT
Start: 2024-05-17

## 2024-05-13 RX ORDER — DIPHENHYDRAMINE HYDROCHLORIDE 50 MG/ML
50 INJECTION INTRAMUSCULAR; INTRAVENOUS ONCE AS NEEDED
Status: CANCELLED | OUTPATIENT
Start: 2024-05-15

## 2024-05-13 RX ORDER — EPINEPHRINE 0.3 MG/.3ML
0.3 INJECTION SUBCUTANEOUS ONCE AS NEEDED
Status: DISCONTINUED | OUTPATIENT
Start: 2024-05-13 | End: 2024-05-13 | Stop reason: HOSPADM

## 2024-05-13 RX ORDER — SODIUM CHLORIDE 0.9 % (FLUSH) 0.9 %
10 SYRINGE (ML) INJECTION
Status: DISCONTINUED | OUTPATIENT
Start: 2024-05-13 | End: 2024-05-13 | Stop reason: HOSPADM

## 2024-05-13 RX ADMIN — BEVACIZUMAB-AWWB 300 MG: 100 INJECTION, SOLUTION INTRAVENOUS at 12:05

## 2024-05-13 RX ADMIN — DEXAMETHASONE SODIUM PHOSPHATE 0.25 MG: 4 INJECTION, SOLUTION INTRA-ARTICULAR; INTRALESIONAL; INTRAMUSCULAR; INTRAVENOUS; SOFT TISSUE at 12:05

## 2024-05-13 RX ADMIN — IRINOTECAN HYDROCHLORIDE 240 MG: 20 INJECTION, SOLUTION INTRAVENOUS at 01:05

## 2024-05-13 RX ADMIN — FLUOROURACIL 3695 MG: 50 INJECTION, SOLUTION INTRAVENOUS at 02:05

## 2024-05-13 RX ADMIN — SODIUM CHLORIDE: 9 INJECTION, SOLUTION INTRAVENOUS at 12:05

## 2024-05-13 NOTE — PLAN OF CARE
Pt ambulatory to clinic with daughter for MVASI/Folfiri infusions. Denies any complaints. Port accessed without difficulty. Good blood return. Pt tolerated treatment well. 5FU infusing via CADD pump with RUN noted on screen and volume decreasing. RTC on Wed at 1230. All connections secured. Ambulatory from clinic in [facility].

## 2024-05-13 NOTE — PROGRESS NOTES
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 Bonilla  12/30/2022: Y-90 to R liver  1/27/2023: Y-90 to R liver  5/17/2023: Y-90 to R liver  7/11/2023: C1 FOLFIRI + Pema  7/26/2023: C2 FOLFIRI + Pema  8/8/2023: C3 FOLFIRI + Pema  8/22/23: C4 FOLFIRI + Pema  Restaging CT CAP 9/1/23: Good response to chemo.  9/7/23: C5 FOLFIRI + Pema  ......................................  Restaging CT CAP 10/12/23: Good response to chemo  10/16/23: C8 FOLFIRI + Pema  .......................................  Restaging CT CAP 12/8/23: Good response to chemo  12/11/23: C12 FOLFIRI + Pema  .......................................  Restaging CT CAP 2/5/24: Good response to chemo  2/8/24: C16 FOLFIRI + Pema    Restaging CT CAP Impression:     Postoperative change extended right hemicolectomy for adenocarcinoma and postprocedural change multiple radioembolizations for metastatic liver disease.  No convincing new disease in the chest, abdomen, or pelvis.     Grossly stable appearance of the liver without evidence of new or enlarging lesions, although assessment is somewhat limited on this single-phase study.     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  This has been previously characterized as possible tumor  thrombus.    Interval History:   He presents today with his daughter prior to cycle 23 of FOLFIRI plus avastin. He is feeling well without any new complaints or concerns.  No bleeding.  Has some mild folliculitis on his scalp from time to time.  No other issues. Had a good holiday with his family. Eating well and has a good appetite.     ECOG status is 1. Presents with his daughter today.    Past Medical History:   Past Medical History:   Diagnosis Date    MARIA A (acute kidney injury) 2022    Hypertension     Metastatic colon cancer to liver 2021     Past Surgical HIstory:   Past Surgical History:   Procedure Laterality Date    COLONOSCOPY N/A 2021    Procedure: COLONOSCOPY with possible stent;  Surgeon: EDILMA Bonilla MD;  Location: [facility] (2ND FLR);  Service: Colon and Rectal;  Laterality: N/A;    COLONOSCOPY N/A 11/3/2023    Procedure: COLONOSCOPY;  Surgeon: EDILMA Bonilla MD;  Location: [facility] (4TH FLR);  Service: Endoscopy;  Laterality: N/A;  prep instructions sent to pt via portal  From: EDILMA Bonilla MD  Procedure: Colonoscopy  Diagnosis: Surveillance colonoscopy - Hx of colon cancer  Procedure Timin-12 weeks  Provider: Myself  Location: [address]  Additional Scheduling Information: No scheduling con    DIAGNOSTIC LAPAROSCOPY N/A 2022    Procedure: LAPAROSCOPY, DIAGNOSTIC;  Surgeon: Adrian Rodriguez MD;  Location: [facility] OR [address] [place] [address];  Service: General;  Laterality: N/A;    INSERTION OF TUNNELED CENTRAL VENOUS CATHETER (CVC) WITH SUBCUTANEOUS PORT N/A 5/3/2021    Procedure: [id number];  Surgeon: Francisco Layton MD;  [facility];  Service: Vascular;  Laterality: N/A;    OMENTECTOMY N/A 10/20/2022    Procedure: OMENTECTOMY;  Surgeon: EDILMA Bonilla MD;  Location: [facility] OR [address] [place] [address];  Service: Colon and Rectal;  Laterality: N/A;    RIGHT HEMICOLECTOMY N/A 10/20/2022    Procedure: HEMICOLECTOMY, RIGHT, extended;  Surgeon: EDILMA Bonilla MD;  Location:  [facility] OR 2ND FLR;  Service: Colon and Rectal;  Laterality: N/A;  Extended right hemicolectomy CONSENT IN AM     Family History:   Family History   Problem Relation Name Age of Onset    Cancer Brother         Social History:  reports that he has never smoked. He has never used smokeless tobacco. He reports that he does not currently use alcohol. He reports that he does not use drugs.    Allergies:  Review of patient's allergies indicates:  No Known Allergies    Medications:  Current Outpatient Medications   Medication Sig Dispense Refill    amLODIPine (NORVASC) 10 MG tablet Take 1 tablet (10 mg total) by mouth once daily. 90 tablet 3    LIDOcaine-prilocaine (EMLA) cream Apply topically as needed (port application). 30 g 3    ondansetron (ZOFRAN) 4 MG tablet Take 1 tablet (4 mg total) by mouth every 8 (eight) hours as needed for Nausea. 30 tablet 3    tamsulosin (FLOMAX) 0.4 mg Cap Take 1 capsule (0.4 mg total) by mouth once daily. 30 capsule 5    acetaminophen (TYLENOL) 500 MG tablet Take 1 tablet (500 mg total) by mouth every 6 (six) hours as needed for Pain (alternate with ibuprofen). (Patient not taking: Reported on 12/26/2023)  0    ibuprofen (ADVIL,MOTRIN) 400 MG tablet Take 1 tablet (400 mg total) by mouth every 6 (six) hours as needed for Other (pain). Alternate with tylenol (Patient not taking: Reported on 12/26/2023)      oxyCODONE (ROXICODONE) 5 MG immediate release tablet Take 1 tablet (5 mg total) by mouth every 6 (six) hours as needed for Pain. (Patient not taking: Reported on 12/26/2023) 20 tablet 0     No current facility-administered medications for this visit.     Review of Systems   Constitutional:  Negative for activity change, appetite change, chills, diaphoresis, fatigue, fever and unexpected weight change.   HENT:  Negative for congestion, mouth sores, nosebleeds, postnasal drip, rhinorrhea, trouble swallowing and voice change.    Eyes:  Negative for pain and visual disturbance.   Respiratory:   "Negative for cough, chest tightness, shortness of breath and wheezing.    Cardiovascular:  Negative for chest pain, palpitations and leg swelling.   Gastrointestinal:  Negative for abdominal distention, abdominal pain, blood in stool, constipation, diarrhea, nausea and vomiting.   Genitourinary:  Negative for difficulty urinating, dysuria, flank pain, frequency, hematuria and urgency.   Musculoskeletal:  Negative for arthralgias, back pain and myalgias.   Skin:  Negative for rash and wound.   Neurological:  Negative for dizziness, facial asymmetry, weakness, light-headedness, numbness and headaches.   Psychiatric/Behavioral:  Negative for agitation, behavioral problems, confusion, decreased concentration, dysphoric mood and sleep disturbance. The patient is not nervous/anxious.    All other systems reviewed and are negative.    ECOG Performance Status: 1     Objective:      Vitals:   Vitals:    05/13/24 1043   BP: 117/80   Pulse: 68   Resp: 16   Temp: 98.2 °F (36.8 °C)   TempSrc: Oral   SpO2: 100%   Weight: 60.7 kg (133 lb 13.1 oz)   Height: 5' 7" (1.702 m)           Physical Exam  Vitals reviewed.   Constitutional:       General: He is not in acute distress.     Appearance: Normal appearance. He is not ill-appearing, toxic-appearing or diaphoretic.   HENT:      Head: Normocephalic and atraumatic.      Right Ear: External ear normal.      Left Ear: External ear normal.      Nose: Nose normal.      Mouth/Throat:      Pharynx: Oropharynx is clear.   Eyes:      General: No scleral icterus.     Extraocular Movements: Extraocular movements intact.      Conjunctiva/sclera: Conjunctivae normal.      Pupils: Pupils are equal, round, and reactive to light.   Cardiovascular:      Rate and Rhythm: Normal rate and regular rhythm.      Pulses: Normal pulses.      Heart sounds: Normal heart sounds. No murmur heard.  Pulmonary:      Effort: Pulmonary effort is normal. No respiratory distress.      Breath sounds: Normal breath " sounds. No wheezing.   Chest:      Comments: Port to RCW, no signs of infection.  Abdominal:      General: Abdomen is flat. Bowel sounds are normal. There is no distension.      Palpations: Abdomen is soft. There is no mass.      Tenderness: There is no abdominal tenderness.      Comments: Vertical midline abdominal wound well healed   Musculoskeletal:         General: No swelling or deformity. Normal range of motion.      Right lower leg: No edema.      Left lower leg: No edema.   Skin:     Coloration: Skin is not jaundiced or pale.      Findings: No bruising, erythema or rash.   Neurological:      General: No focal deficit present.      Mental Status: He is alert and oriented to person, place, and time. Mental status is at baseline.      Cranial Nerves: No cranial nerve deficit.      Sensory: No sensory deficit.      Gait: Gait normal.   Psychiatric:         Mood and Affect: Mood normal.         Behavior: Behavior normal.         Thought Content: Thought content normal.         Judgment: Judgment normal.     Laboratory Data:  Lab Visit on 05/13/2024   Component Date Value Ref Range Status    Sodium 05/13/2024 139  136 - 145 mmol/L Final    Potassium 05/13/2024 5.2 (H)  3.5 - 5.1 mmol/L Final    Chloride 05/13/2024 109  95 - 110 mmol/L Final    CO2 05/13/2024 23  23 - 29 mmol/L Final    Glucose 05/13/2024 87  70 - 110 mg/dL Final    BUN 05/13/2024 9  8 - 23 mg/dL Final    Creatinine 05/13/2024 0.9  0.5 - 1.4 mg/dL Final    Calcium 05/13/2024 9.6  8.7 - 10.5 mg/dL Final    Total Protein 05/13/2024 7.0  6.0 - 8.4 g/dL Final    Albumin 05/13/2024 3.3 (L)  3.5 - 5.2 g/dL Final    Total Bilirubin 05/13/2024 1.1 (H)  0.1 - 1.0 mg/dL Final    Comment: For infants and newborns, interpretation of results should be based  on gestational age, weight and in agreement with clinical  observations.    Premature Infant recommended reference ranges:  Up to 24 hours.............<8.0 mg/dL  Up to 48 hours............<12.0 mg/dL  3-5  days..................<15.0 mg/dL  6-29 days.................<15.0 mg/dL      Alkaline Phosphatase 05/13/2024 131  55 - 135 U/L Final    AST 05/13/2024 28  10 - 40 U/L Final    ALT 05/13/2024 14  10 - 44 U/L Final    eGFR 05/13/2024 >60.0  >60 mL/min/1.73 m^2 Final    Anion Gap 05/13/2024 7 (L)  8 - 16 mmol/L Final    WBC 05/13/2024 3.25 (L)  3.90 - 12.70 K/uL Final    RBC 05/13/2024 4.32 (L)  4.60 - 6.20 M/uL Final    Hemoglobin 05/13/2024 10.6 (L)  14.0 - 18.0 g/dL Final    Hematocrit 05/13/2024 37.8 (L)  40.0 - 54.0 % Final    MCV 05/13/2024 88  82 - 98 fL Final    MCH 05/13/2024 24.5 (L)  27.0 - 31.0 pg Final    MCHC 05/13/2024 28.0 (L)  32.0 - 36.0 g/dL Final    RDW 05/13/2024 18.5 (H)  11.5 - 14.5 % Final    Platelets 05/13/2024 201  150 - 450 K/uL Final    MPV 05/13/2024 12.2  9.2 - 12.9 fL Final    Immature Granulocytes 05/13/2024 0.3  0.0 - 0.5 % Final    Gran # (ANC) 05/13/2024 1.7 (L)  1.8 - 7.7 K/uL Final    Immature Grans (Abs) 05/13/2024 0.01  0.00 - 0.04 K/uL Final    Comment: Mild elevation in immature granulocytes is non specific and   can be seen in a variety of conditions including stress response,   acute inflammation, trauma and pregnancy. Correlation with other   laboratory and clinical findings is essential.      Lymph # 05/13/2024 0.8 (L)  1.0 - 4.8 K/uL Final    Mono # 05/13/2024 0.6  0.3 - 1.0 K/uL Final    Eos # 05/13/2024 0.1  0.0 - 0.5 K/uL Final    Baso # 05/13/2024 0.03  0.00 - 0.20 K/uL Final    nRBC 05/13/2024 0  0 /100 WBC Final    Gran % 05/13/2024 53.3  38.0 - 73.0 % Final    Lymph % 05/13/2024 24.6  18.0 - 48.0 % Final    Mono % 05/13/2024 19.1 (H)  4.0 - 15.0 % Final    Eosinophil % 05/13/2024 1.8  0.0 - 8.0 % Final    Basophil % 05/13/2024 0.9  0.0 - 1.9 % Final    Differential Method 05/13/2024 Automated   Final     Assessment and Plan        1. Metastatic colon cancer to liver    2. Immunodeficiency secondary to neoplasm    3. Immunodeficiency due to chemotherapy    4.  Hypertension, unspecified type    5. MARIA A (acute kidney injury)    6. Anemia associated with chemotherapy    7. Hyperkalemia    8. Lower urinary tract symptoms (LUTS)    9. Severe malnutrition    10. Drug-induced constipation    11. Weight decrease    12. Pain      1-3.  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  Guardant 360 was negative for BRAF, VIRI, HER2 amplification.   Pretreatment CEA 57.  We had a long and sonia discussion with him about his diagnosis. Unfortunately, the disease is not curable but remains treatable and he has good performance status.   Restaging scans after 7 cycles of FOLFOX + Pema showed significant reduction in colonic mass and liver mass.   we switched to maintenance as of cycle 8 with 5FU + Pema  Restaging scans from March 2022 show stable disease.   MRI on 7/18/22 showing hepatic progression of disease in the right hepatic lobe noted on the exam. CT CAP on 8/26 shows some mild progression in both liver metastases.    Discussed case at colorectal tumor board 8/31/22 and had a diagnostic lap performed by Dr. Rodriguez on 9/7 to assess for any occult peritoneal disease. Findings from the diagnostic lap were negative. Fluid from around from around the primary mass was sent for cytology that was negative for malignancy.   Underwent primary tumor resection on 10/20/22 with Dr. Bonilla.    Meanwhile his liver mets had grown.  We discussed his case at [facility] conference with plans for short term Y-90 and then restarting chemotherapy prior to considering any surgical options to his liver metastases.  He underwent Y-90 delivery on 12/30/22. Tolerated well.   CT CAP on 1/23/23 reviewed at [facility] conference with good response to Y-90, no new lesions.  Underwent second Y-90 on 1/27/23. Can consider R hepatectomy pending follow-up imaging after Y-90.     Received cycle 42 of FOLFOX (omitted oxaliplatin again starting cycle 41 due to neuropathy) on 2/9/23.  Because of 5-FU  shortage nationally, we have been holding chemotherapy since mid-February 2023.  If he needs to restart chemotherapy (I.e. no plans for surgical resection), will place him on capecitabine maintenance for duration of 5-FU shortage.     Discussed at colorectal liver mets tumor board 3/16/23.  Plan for repeat Y-90 and then consideration of surgical resection and he will meet with liver transplant team as well.  Underwent Y-90 delivery 5/17/23 with IR.     Has been on maintenance Xeloda since late April 2023.    Met with liver transplant team but ultimately opted not to proceed with transplant as he was concerned about how he would tolerate a major surgery with the life changes that would come after transplant as well. They closed out his case.    He met with Dr. Rodriguez to discuss whether surgical resection is indicated for his liver mets.  He recommended repeat MRI.  Repeat MRI unfortunately showed disease progression while on Xeloda maintenance.  Similarly his CEA garrett precipitously, all in keeping with worsening disease.    We recommended we restart IV chemotherapy with FOLFIRI + Avastin (never had irinotecan).    Because of hyperbilirubinemia he received cycle 1 with just 5-FU + Avastin on 7/11/23. Tolerated this well. Bilirubin has improved.  Received irinotecan starting with cycle 2.  Repeat CT CAP after cycle 4 shows good response to therapy.   Excellent tolerance. [facility] tumor board agrees with continuation of chemotherapy.   Repeat CT CAP after cycle 7 shows stable disease, no evidence of new or worsening disease.   Repeat CT CAP after cycle 11 shows stable disease.   Repeat CT CAP after cycle 15 shows improved disease.  Repeat CT CAP after cycle 21 shows stable disease.     Doing well today. Tolerating treatment well.   Most recent CT displays stability of disease. Recc to continue with chemotherapy  Labs reviewed and adequate for treatment.   Will proceed with cycle 23 today.   Repeat imaging after this cycle  in 4 weeks.     -RTC in 2 weeks for next cycle with lab work, UA    Tempus: APC, CKS1B cng, ERCC3 cnl, PIK3CA; KENIA, TMB 12.1.    4. Hypertension   -- BP well controlled today. Feels well.   -- Following with PCP.   -- encouraged him and his daughter to monitor BP and HR closely at home.     5. MARIA A  -- Resolved   -- Monitor. Encouraged continued hydration particularly with working outside.     6. Anemia  -- Hgb stable. Monitor.  -- No signs of bleeding. Platelets normal.     7-10. Neoplasm related pain, weight loss, constipation  -- Pain very well controlled. Has oxycodone 5mg to use PRN but not requiring now.  -- Following with nutrition. Encouraged increased protein. Monitor.  -- Continue Miralax daily for constipation.    11, Urinary Hesitancy.  -- Continue Flomax.   -- Reported improvement since starting medication.     12. Hyperkalemia  - K 5.2 today. Pt feeling well. Will continue to monitor.     Patient is in agreement with the proposed treatment plan. All questions were answered to the patient's satisfaction. Pt knows to call clinic if anything is needed before the next clinic visit.      FAN Meier, PA-C  Physician Assistant Certified  Dept of Hematology/Oncology  PA-C to Dr. Mueller, Dr. Schuster and Dr. Castellon     Route Chart for Scheduling    Med Onc Chart Routing  Urgent    Follow up with physician 2 weeks and 4 weeks. See Dr Schuster as scheduled in 2 weeks. Try to schedule with Dr Schuster in 4 weeks with lab work, CT CAP and treatment discussion with chemotherapy   Follow up with RACHEL 6 weeks. see RACHEL in 6 weeks and 8 weeks with lab work and chemotherapy   Infusion scheduling note    Injection scheduling note    Labs CBC, CMP, CEA and urinalysis   Scheduling:  Preferred lab:  Lab interval: every 2 weeks     Imaging CT chest abdomen pelvis   Scheduled in 4 weeks.   Pharmacy appointment    Other referrals

## 2024-05-15 ENCOUNTER — INFUSION (OUTPATIENT)
Dept: INFUSION THERAPY | Facility: HOSPITAL | Age: 74
End: 2024-05-15
Payer: MEDICARE

## 2024-05-15 VITALS
OXYGEN SATURATION: 100 % | DIASTOLIC BLOOD PRESSURE: 76 MMHG | TEMPERATURE: 98 F | SYSTOLIC BLOOD PRESSURE: 125 MMHG | RESPIRATION RATE: 16 BRPM | HEART RATE: 89 BPM

## 2024-05-15 DIAGNOSIS — C18.9 METASTATIC COLON CANCER TO LIVER: Primary | ICD-10-CM

## 2024-05-15 DIAGNOSIS — C78.7 METASTATIC COLON CANCER TO LIVER: Primary | ICD-10-CM

## 2024-05-15 PROCEDURE — 63600175 PHARM REV CODE 636 W HCPCS: Performed by: PHYSICIAN ASSISTANT

## 2024-05-15 PROCEDURE — A4216 STERILE WATER/SALINE, 10 ML: HCPCS | Performed by: PHYSICIAN ASSISTANT

## 2024-05-15 PROCEDURE — 25000003 PHARM REV CODE 250: Performed by: PHYSICIAN ASSISTANT

## 2024-05-15 RX ORDER — SODIUM CHLORIDE 0.9 % (FLUSH) 0.9 %
10 SYRINGE (ML) INJECTION
Status: DISCONTINUED | OUTPATIENT
Start: 2024-05-15 | End: 2024-05-15 | Stop reason: HOSPADM

## 2024-05-15 RX ORDER — HEPARIN 100 UNIT/ML
500 SYRINGE INTRAVENOUS
Status: DISCONTINUED | OUTPATIENT
Start: 2024-05-15 | End: 2024-05-15 | Stop reason: HOSPADM

## 2024-05-15 RX ADMIN — Medication 10 ML: at 12:05

## 2024-05-15 RX ADMIN — HEPARIN 500 UNITS: 100 SYRINGE at 12:05

## 2024-05-15 NOTE — PLAN OF CARE
1242 Patient tolerated treatment well and has no complaints. Pump disconnected, port flushed and heparin locked. Bandaid to port site. He declined the AVS and ambulated out.

## 2024-05-27 ENCOUNTER — OFFICE VISIT (OUTPATIENT)
Dept: HEMATOLOGY/ONCOLOGY | Facility: CLINIC | Age: 74
End: 2024-05-27
Payer: MEDICARE

## 2024-05-27 ENCOUNTER — INFUSION (OUTPATIENT)
Dept: INFUSION THERAPY | Facility: HOSPITAL | Age: 74
End: 2024-05-27
Payer: MEDICARE

## 2024-05-27 VITALS
OXYGEN SATURATION: 100 % | TEMPERATURE: 99 F | WEIGHT: 128.5 LBS | SYSTOLIC BLOOD PRESSURE: 112 MMHG | DIASTOLIC BLOOD PRESSURE: 62 MMHG | HEIGHT: 67 IN | HEART RATE: 72 BPM | BODY MASS INDEX: 20.17 KG/M2

## 2024-05-27 VITALS
TEMPERATURE: 99 F | DIASTOLIC BLOOD PRESSURE: 71 MMHG | OXYGEN SATURATION: 100 % | HEART RATE: 61 BPM | SYSTOLIC BLOOD PRESSURE: 119 MMHG | HEIGHT: 67 IN | RESPIRATION RATE: 18 BRPM | WEIGHT: 128.5 LBS | BODY MASS INDEX: 20.17 KG/M2

## 2024-05-27 DIAGNOSIS — N17.9 AKI (ACUTE KIDNEY INJURY): ICD-10-CM

## 2024-05-27 DIAGNOSIS — R63.4 WEIGHT DECREASE: ICD-10-CM

## 2024-05-27 DIAGNOSIS — R39.9 LOWER URINARY TRACT SYMPTOMS (LUTS): ICD-10-CM

## 2024-05-27 DIAGNOSIS — T45.1X5A ANEMIA ASSOCIATED WITH CHEMOTHERAPY: ICD-10-CM

## 2024-05-27 DIAGNOSIS — C18.9 METASTATIC COLON CANCER TO LIVER: Primary | ICD-10-CM

## 2024-05-27 DIAGNOSIS — E87.5 HYPERKALEMIA: ICD-10-CM

## 2024-05-27 DIAGNOSIS — R52 PAIN: ICD-10-CM

## 2024-05-27 DIAGNOSIS — D84.81 IMMUNODEFICIENCY SECONDARY TO NEOPLASM: ICD-10-CM

## 2024-05-27 DIAGNOSIS — D84.821 IMMUNODEFICIENCY DUE TO CHEMOTHERAPY: ICD-10-CM

## 2024-05-27 DIAGNOSIS — D64.81 ANEMIA ASSOCIATED WITH CHEMOTHERAPY: ICD-10-CM

## 2024-05-27 DIAGNOSIS — I10 HYPERTENSION, UNSPECIFIED TYPE: ICD-10-CM

## 2024-05-27 DIAGNOSIS — Z79.899 IMMUNODEFICIENCY DUE TO CHEMOTHERAPY: ICD-10-CM

## 2024-05-27 DIAGNOSIS — K59.03 DRUG-INDUCED CONSTIPATION: ICD-10-CM

## 2024-05-27 DIAGNOSIS — D49.9 IMMUNODEFICIENCY SECONDARY TO NEOPLASM: ICD-10-CM

## 2024-05-27 DIAGNOSIS — C78.7 METASTATIC COLON CANCER TO LIVER: Primary | ICD-10-CM

## 2024-05-27 DIAGNOSIS — E43 SEVERE MALNUTRITION: ICD-10-CM

## 2024-05-27 DIAGNOSIS — T45.1X5A IMMUNODEFICIENCY DUE TO CHEMOTHERAPY: ICD-10-CM

## 2024-05-27 PROCEDURE — 96417 CHEMO IV INFUS EACH ADDL SEQ: CPT

## 2024-05-27 PROCEDURE — 1160F RVW MEDS BY RX/DR IN RCRD: CPT | Mod: CPTII,S$GLB,, | Performed by: INTERNAL MEDICINE

## 2024-05-27 PROCEDURE — 3008F BODY MASS INDEX DOCD: CPT | Mod: CPTII,S$GLB,, | Performed by: INTERNAL MEDICINE

## 2024-05-27 PROCEDURE — 96367 TX/PROPH/DG ADDL SEQ IV INF: CPT

## 2024-05-27 PROCEDURE — 96413 CHEMO IV INFUSION 1 HR: CPT

## 2024-05-27 PROCEDURE — 1159F MED LIST DOCD IN RCRD: CPT | Mod: CPTII,S$GLB,, | Performed by: INTERNAL MEDICINE

## 2024-05-27 PROCEDURE — G2211 COMPLEX E/M VISIT ADD ON: HCPCS | Mod: S$GLB,,, | Performed by: INTERNAL MEDICINE

## 2024-05-27 PROCEDURE — 96416 CHEMO PROLONG INFUSE W/PUMP: CPT

## 2024-05-27 PROCEDURE — 1101F PT FALLS ASSESS-DOCD LE1/YR: CPT | Mod: CPTII,S$GLB,, | Performed by: INTERNAL MEDICINE

## 2024-05-27 PROCEDURE — 3074F SYST BP LT 130 MM HG: CPT | Mod: CPTII,S$GLB,, | Performed by: INTERNAL MEDICINE

## 2024-05-27 PROCEDURE — 99999 PR PBB SHADOW E&M-EST. PATIENT-LVL III: CPT | Mod: PBBFAC,,, | Performed by: INTERNAL MEDICINE

## 2024-05-27 PROCEDURE — 99215 OFFICE O/P EST HI 40 MIN: CPT | Mod: S$GLB,,, | Performed by: INTERNAL MEDICINE

## 2024-05-27 PROCEDURE — 25000003 PHARM REV CODE 250: Performed by: REGISTERED NURSE

## 2024-05-27 PROCEDURE — 3078F DIAST BP <80 MM HG: CPT | Mod: CPTII,S$GLB,, | Performed by: INTERNAL MEDICINE

## 2024-05-27 PROCEDURE — 1126F AMNT PAIN NOTED NONE PRSNT: CPT | Mod: CPTII,S$GLB,, | Performed by: INTERNAL MEDICINE

## 2024-05-27 PROCEDURE — 96375 TX/PRO/DX INJ NEW DRUG ADDON: CPT

## 2024-05-27 PROCEDURE — 3288F FALL RISK ASSESSMENT DOCD: CPT | Mod: CPTII,S$GLB,, | Performed by: INTERNAL MEDICINE

## 2024-05-27 PROCEDURE — 63600175 PHARM REV CODE 636 W HCPCS: Mod: JZ,JG | Performed by: REGISTERED NURSE

## 2024-05-27 RX ORDER — HEPARIN 100 UNIT/ML
500 SYRINGE INTRAVENOUS
Status: CANCELLED | OUTPATIENT
Start: 2024-05-31

## 2024-05-27 RX ORDER — HEPARIN 100 UNIT/ML
500 SYRINGE INTRAVENOUS
Status: DISCONTINUED | OUTPATIENT
Start: 2024-05-27 | End: 2024-05-27 | Stop reason: HOSPADM

## 2024-05-27 RX ORDER — PROCHLORPERAZINE EDISYLATE 5 MG/ML
5 INJECTION INTRAMUSCULAR; INTRAVENOUS ONCE AS NEEDED
Status: DISCONTINUED | OUTPATIENT
Start: 2024-05-27 | End: 2024-05-27 | Stop reason: HOSPADM

## 2024-05-27 RX ORDER — SODIUM CHLORIDE 0.9 % (FLUSH) 0.9 %
10 SYRINGE (ML) INJECTION
Status: CANCELLED | OUTPATIENT
Start: 2024-05-31

## 2024-05-27 RX ORDER — SODIUM CHLORIDE 0.9 % (FLUSH) 0.9 %
10 SYRINGE (ML) INJECTION
Status: DISCONTINUED | OUTPATIENT
Start: 2024-05-27 | End: 2024-05-27 | Stop reason: HOSPADM

## 2024-05-27 RX ORDER — SODIUM CHLORIDE 0.9 % (FLUSH) 0.9 %
10 SYRINGE (ML) INJECTION
Status: CANCELLED | OUTPATIENT
Start: 2024-05-29

## 2024-05-27 RX ORDER — EPINEPHRINE 0.3 MG/.3ML
0.3 INJECTION SUBCUTANEOUS ONCE AS NEEDED
Status: CANCELLED | OUTPATIENT
Start: 2024-05-29

## 2024-05-27 RX ORDER — HEPARIN 100 UNIT/ML
500 SYRINGE INTRAVENOUS
Status: CANCELLED | OUTPATIENT
Start: 2024-05-29

## 2024-05-27 RX ORDER — DIPHENHYDRAMINE HYDROCHLORIDE 50 MG/ML
50 INJECTION INTRAMUSCULAR; INTRAVENOUS ONCE AS NEEDED
Status: DISCONTINUED | OUTPATIENT
Start: 2024-05-27 | End: 2024-05-27 | Stop reason: HOSPADM

## 2024-05-27 RX ORDER — EPINEPHRINE 0.3 MG/.3ML
0.3 INJECTION SUBCUTANEOUS ONCE AS NEEDED
Status: DISCONTINUED | OUTPATIENT
Start: 2024-05-27 | End: 2024-05-27 | Stop reason: HOSPADM

## 2024-05-27 RX ORDER — DIPHENHYDRAMINE HYDROCHLORIDE 50 MG/ML
50 INJECTION INTRAMUSCULAR; INTRAVENOUS ONCE AS NEEDED
Status: CANCELLED | OUTPATIENT
Start: 2024-05-29

## 2024-05-27 RX ORDER — ATROPINE SULFATE 0.4 MG/ML
0.4 INJECTION, SOLUTION ENDOTRACHEAL; INTRAMEDULLARY; INTRAMUSCULAR; INTRAVENOUS; SUBCUTANEOUS ONCE AS NEEDED
Status: COMPLETED | OUTPATIENT
Start: 2024-05-27 | End: 2024-05-27

## 2024-05-27 RX ORDER — PROCHLORPERAZINE EDISYLATE 5 MG/ML
5 INJECTION INTRAMUSCULAR; INTRAVENOUS ONCE AS NEEDED
Status: CANCELLED
Start: 2024-05-29

## 2024-05-27 RX ORDER — ATROPINE SULFATE 0.4 MG/ML
0.4 INJECTION, SOLUTION ENDOTRACHEAL; INTRAMEDULLARY; INTRAMUSCULAR; INTRAVENOUS; SUBCUTANEOUS ONCE AS NEEDED
Status: CANCELLED | OUTPATIENT
Start: 2024-05-29

## 2024-05-27 RX ADMIN — FLUOROURACIL 3695 MG: 50 INJECTION, SOLUTION INTRAVENOUS at 02:05

## 2024-05-27 RX ADMIN — DEXAMETHASONE SODIUM PHOSPHATE 0.25 MG: 4 INJECTION, SOLUTION INTRA-ARTICULAR; INTRALESIONAL; INTRAMUSCULAR; INTRAVENOUS; SOFT TISSUE at 11:05

## 2024-05-27 RX ADMIN — BEVACIZUMAB-AWWB 300 MG: 100 INJECTION, SOLUTION INTRAVENOUS at 11:05

## 2024-05-27 RX ADMIN — ATROPINE SULFATE 0.4 MG: 0.4 INJECTION, SOLUTION INTRAVENOUS at 12:05

## 2024-05-27 RX ADMIN — SODIUM CHLORIDE: 9 INJECTION, SOLUTION INTRAVENOUS at 10:05

## 2024-05-27 RX ADMIN — IRINOTECAN HYDROCHLORIDE 240 MG: 20 INJECTION, SOLUTION INTRAVENOUS at 12:05

## 2024-05-27 NOTE — PLAN OF CARE
"  Problem: Fatigue  Goal: Improved Activity Tolerance  Outcome: Progressing  Intervention: Promote Improved Energy  Flowsheets (Taken 5/27/2024 1026)  Fatigue Management:   activity schedule adjusted   activity assistance provided   fatigue-related activity identified   frequent rest breaks encouraged   paced activity encouraged  Sleep/Rest Enhancement:   awakenings minimized   consistent schedule promoted   family presence promoted   natural light exposure provided   noise level reduced   reading promoted   regular sleep/rest pattern promoted   relaxation techniques promoted  Activity Management:   Ambulated -L4   Up in chair - L3  Environmental Support:   calm environment promoted   caregiver consistency promoted   comfort object encouraged   distractions minimized   environmental consistency promoted   personal routine supported   rest periods encouraged  /71 (Patient Position: Sitting)   Pulse 61   Temp 98.6 °F (37 °C)   Resp 18   Ht 5' 7" (1.702 m)   Wt 58.3 kg (128 lb 8.5 oz)   SpO2 100%   BMI 20.13 kg/m² Pleasant, alert and oriented patient to Chemo Infusion per self with family for C24 MVASI/Folfiri - VSS and RCW Port accessed with blood return observed, flushed with NS, dressing applied with Bio-Patch in place, and patient tolerated procedure well - patient tolerated treatment with no AVE's, port flushed with NS, blood return observed, 5fu 46 hour pump attached, all clamps open and checked, pump infusing, connection secured and patient discharged to home with no concerns - RTC on 4/29/24 at 1230 for pump d/c        "

## 2024-05-27 NOTE — PROGRESS NOTES
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 Bonilla  12/30/2022: Y-90 to R liver  1/27/2023: Y-90 to R liver  5/17/2023: Y-90 to R liver  7/11/2023: C1 FOLFIRI + Pema  7/26/2023: C2 FOLFIRI + Pema  8/8/2023: C3 FOLFIRI + Pema  8/22/23: C4 FOLFIRI + Pema  Restaging CT CAP 9/1/23: Good response to chemo.  9/7/23: C5 FOLFIRI + Pema  ......................................  Restaging CT CAP 10/12/23: Good response to chemo  10/16/23: C8 FOLFIRI + Pema  .......................................  Restaging CT CAP 12/8/23: Good response to chemo  12/11/23: C12 FOLFIRI + Pema  .......................................  Restaging CT CAP 2/5/24: Good response to chemo  2/8/24: C16 FOLFIRI + Pema    Restaging CT CAP Impression:     Postoperative change extended right hemicolectomy for adenocarcinoma and postprocedural change multiple radioembolizations for metastatic liver disease.  No convincing new disease in the chest, abdomen, or pelvis.     Grossly stable appearance of the liver without evidence of new or enlarging lesions, although assessment is somewhat limited on this single-phase study.     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  This has been previously characterized as possible tumor  thrombus.    Interval History:   He presents today with his daughter prior to cycle 24 of FOLFIRI plus avastin. Doing well overall with no new concerns. Energy and appetite are good. Staying hydrated. No significant nausea, diarrhea or constipation. No bleeding concerns. Folliculitis has resolved. No other rashes or skin changes. No fevers, chills, SOB, CP, palpitations. Looking forward to spending time with his grandchildren this afternoon.     ECOG status is 1. Presents with his daughter today.    Past Medical History:   Past Medical History:   Diagnosis Date    MARIA A (acute kidney injury) 2022    Hypertension     Metastatic colon cancer to liver 2021     Past Surgical HIstory:   Past Surgical History:   Procedure Laterality Date    COLONOSCOPY N/A 2021    Procedure: COLONOSCOPY with possible stent;  Surgeon: EDILMA Bonilla MD;  Location: [facility] (2ND FLR);  Service: Colon and Rectal;  Laterality: N/A;    COLONOSCOPY N/A 11/3/2023    Procedure: COLONOSCOPY;  Surgeon: EDILMA Bonilla MD;  Location: [facility] (4TH FLR);  Service: Endoscopy;  Laterality: N/A;  prep instructions sent to pt via portal  From: EDILMA Bonilla MD  Procedure: Colonoscopy  Diagnosis: Surveillance colonoscopy - Hx of colon cancer  Procedure Timin-12 weeks  Provider: Myself  Location: [address]  Additional Scheduling Information: No scheduling con    DIAGNOSTIC LAPAROSCOPY N/A 2022    Procedure: LAPAROSCOPY, DIAGNOSTIC;  Surgeon: Adrian Rodriguez MD;  Location: [facility] OR [address] [place] [address];  Service: General;  Laterality: N/A;    INSERTION OF TUNNELED CENTRAL VENOUS CATHETER (CVC) WITH SUBCUTANEOUS PORT N/A 5/3/2021    Procedure: [id number];  Surgeon: Francisco Layton MD;  [facility];  Service: Vascular;  Laterality: N/A;    OMENTECTOMY N/A 10/20/2022    Procedure: OMENTECTOMY;  Surgeon: EDILMA Bonilla MD;  Location: [facility] OR [address] [place] [address];  Service: Colon and Rectal;  Laterality: N/A;    RIGHT  HEMICOLECTOMY N/A 10/20/2022    Procedure: HEMICOLECTOMY, RIGHT, extended;  Surgeon: EDILMA Bonilla MD;  Location: [facility] OR [address] [place] [address];  Service: Colon and Rectal;  Laterality: N/A;  Extended right hemicolectomy CONSENT IN AM     Family History:   Family History   Problem Relation Name Age of Onset    Cancer Brother         Social History:  reports that he has never smoked. He has never used smokeless tobacco. He reports that he does not currently use alcohol. He reports that he does not use drugs.    Allergies:  Review of patient's allergies indicates:  No Known Allergies    Medications:  Current Outpatient Medications   Medication Sig Dispense Refill    amLODIPine (NORVASC) 10 MG tablet Take 1 tablet (10 mg total) by mouth once daily. 90 tablet 3    acetaminophen (TYLENOL) 500 MG tablet Take 1 tablet (500 mg total) by mouth every 6 (six) hours as needed for Pain (alternate with ibuprofen). (Patient not taking: Reported on 12/26/2023)  0    ibuprofen (ADVIL,MOTRIN) 400 MG tablet Take 1 tablet (400 mg total) by mouth every 6 (six) hours as needed for Other (pain). Alternate with tylenol (Patient not taking: Reported on 12/26/2023)      LIDOcaine-prilocaine (EMLA) cream Apply topically as needed (port application). (Patient not taking: Reported on 5/27/2024) 30 g 3    ondansetron (ZOFRAN) 4 MG tablet Take 1 tablet (4 mg total) by mouth every 8 (eight) hours as needed for Nausea. (Patient not taking: Reported on 5/27/2024) 30 tablet 3    oxyCODONE (ROXICODONE) 5 MG immediate release tablet Take 1 tablet (5 mg total) by mouth every 6 (six) hours as needed for Pain. (Patient not taking: Reported on 12/26/2023) 20 tablet 0    tamsulosin (FLOMAX) 0.4 mg Cap Take 1 capsule (0.4 mg total) by mouth once daily. (Patient not taking: Reported on 5/27/2024) 30 capsule 5     No current facility-administered medications for this visit.     Review of Systems   Constitutional:  Negative for activity change, appetite change, chills,  "diaphoresis, fatigue, fever and unexpected weight change.   HENT:  Negative for congestion, mouth sores, nosebleeds, postnasal drip, rhinorrhea, trouble swallowing and voice change.    Eyes:  Negative for pain and visual disturbance.   Respiratory:  Negative for cough, chest tightness, shortness of breath and wheezing.    Cardiovascular:  Negative for chest pain, palpitations and leg swelling.   Gastrointestinal:  Negative for abdominal distention, abdominal pain, blood in stool, constipation, diarrhea, nausea and vomiting.   Genitourinary:  Negative for difficulty urinating, dysuria, flank pain, frequency, hematuria and urgency.   Musculoskeletal:  Negative for arthralgias, back pain and myalgias.   Skin:  Negative for rash and wound.   Neurological:  Negative for dizziness, facial asymmetry, weakness, light-headedness, numbness and headaches.   Psychiatric/Behavioral:  Negative for agitation, behavioral problems, confusion, decreased concentration, dysphoric mood and sleep disturbance. The patient is not nervous/anxious.    All other systems reviewed and are negative.    ECOG Performance Status: 1     Objective:      Vitals:   Vitals:    05/27/24 0915   BP: 112/62   BP Location: Left arm   Patient Position: Sitting   BP Method: Medium (Automatic)   Pulse: 72   Temp: 98.6 °F (37 °C)   TempSrc: Oral   SpO2: 100%   Weight: 58.3 kg (128 lb 8.5 oz)   Height: 5' 7" (1.702 m)       Physical Exam  Vitals reviewed.   Constitutional:       General: He is not in acute distress.     Appearance: Normal appearance. He is not ill-appearing, toxic-appearing or diaphoretic.   HENT:      Head: Normocephalic and atraumatic.      Right Ear: External ear normal.      Left Ear: External ear normal.      Nose: Nose normal.      Mouth/Throat:      Pharynx: Oropharynx is clear.   Eyes:      General: No scleral icterus.     Extraocular Movements: Extraocular movements intact.      Conjunctiva/sclera: Conjunctivae normal.      Pupils: " Pupils are equal, round, and reactive to light.   Cardiovascular:      Rate and Rhythm: Normal rate and regular rhythm.      Pulses: Normal pulses.      Heart sounds: Normal heart sounds. No murmur heard.  Pulmonary:      Effort: Pulmonary effort is normal. No respiratory distress.      Breath sounds: Normal breath sounds. No wheezing.   Chest:      Comments: Port to RCW, no signs of infection.  Abdominal:      General: Abdomen is flat. Bowel sounds are normal. There is no distension.      Palpations: Abdomen is soft. There is no mass.      Tenderness: There is no abdominal tenderness.      Comments: Vertical midline abdominal wound well healed   Musculoskeletal:         General: No swelling or deformity. Normal range of motion.      Right lower leg: No edema.      Left lower leg: No edema.   Skin:     Coloration: Skin is not jaundiced or pale.      Findings: No bruising, erythema or rash.   Neurological:      General: No focal deficit present.      Mental Status: He is alert and oriented to person, place, and time. Mental status is at baseline.      Cranial Nerves: No cranial nerve deficit.      Sensory: No sensory deficit.      Gait: Gait normal.   Psychiatric:         Mood and Affect: Mood normal.         Behavior: Behavior normal.         Thought Content: Thought content normal.         Judgment: Judgment normal.     Laboratory Data:  Lab Visit on 05/27/2024   Component Date Value Ref Range Status    Specimen UA 05/27/2024 Urine, Clean Catch   Final    Color, UA 05/27/2024 Yellow  Yellow, Straw, [place]ix[place] Final    Appearance, UA 05/27/2024 Clear  Clear Final    pH, UA 05/27/2024 6.0  5.0 - 8.0 Final    Specific Gravity, UA 05/27/2024 1.020  1.005 - 1.030 Final    Protein, UA 05/27/2024 Trace (A)  Negative Final    Comment: Recommend a 24 hour urine protein or a urine   protein/creatinine ratio if globulin induced proteinuria is  clinically suspected.      Glucose, UA 05/27/2024 Negative  Negative Final    Ketones,  UA 05/27/2024 Negative  Negative Final    Bilirubin (UA) 05/27/2024 Negative  Negative Final    Occult Blood UA 05/27/2024 Negative  Negative Final    Nitrite, UA 05/27/2024 Negative  Negative Final    Leukocytes, UA 05/27/2024 Trace (A)  Negative Final    RBC, UA 05/27/2024 1  0 - 4 /hpf Final    WBC, UA 05/27/2024 2  0 - 5 /hpf Final    Bacteria 05/27/2024 Few (A)  None-Occ /hpf Final    Squam Epithel, UA 05/27/2024 0  /hpf Final    Microscopic Comment 05/27/2024 SEE COMMENT   Final    Comment: Other formed elements not mentioned in the report are not   present in the microscopic examination.      Lab Visit on 05/27/2024   Component Date Value Ref Range Status    WBC 05/27/2024 3.54 (L)  3.90 - 12.70 K/uL Final    RBC 05/27/2024 4.18 (L)  4.60 - 6.20 M/uL Final    Hemoglobin 05/27/2024 10.3 (L)  14.0 - 18.0 g/dL Final    Hematocrit 05/27/2024 35.2 (L)  40.0 - 54.0 % Final    MCV 05/27/2024 84  82 - 98 fL Final    MCH 05/27/2024 24.6 (L)  27.0 - 31.0 pg Final    MCHC 05/27/2024 29.3 (L)  32.0 - 36.0 g/dL Final    RDW 05/27/2024 19.9 (H)  11.5 - 14.5 % Final    Platelets 05/27/2024 176  150 - 450 K/uL Final    MPV 05/27/2024 11.1  9.2 - 12.9 fL Final    Immature Granulocytes 05/27/2024 0.3  0.0 - 0.5 % Final    Gran # (ANC) 05/27/2024 1.7 (L)  1.8 - 7.7 K/uL Final    Immature Grans (Abs) 05/27/2024 0.01  0.00 - 0.04 K/uL Final    Comment: Mild elevation in immature granulocytes is non specific and   can be seen in a variety of conditions including stress response,   acute inflammation, trauma and pregnancy. Correlation with other   laboratory and clinical findings is essential.      Lymph # 05/27/2024 0.9 (L)  1.0 - 4.8 K/uL Final    Mono # 05/27/2024 0.8  0.3 - 1.0 K/uL Final    Eos # 05/27/2024 0.1  0.0 - 0.5 K/uL Final    Baso # 05/27/2024 0.02  0.00 - 0.20 K/uL Final    nRBC 05/27/2024 0  0 /100 WBC Final    Gran % 05/27/2024 48.5  38.0 - 73.0 % Final    Lymph % 05/27/2024 24.9  18.0 - 48.0 % Final    Mono %  05/27/2024 22.6 (H)  4.0 - 15.0 % Final    Eosinophil % 05/27/2024 3.1  0.0 - 8.0 % Final    Basophil % 05/27/2024 0.6  0.0 - 1.9 % Final    Differential Method 05/27/2024 Automated   Final    Sodium 05/27/2024 141  136 - 145 mmol/L Final    Potassium 05/27/2024 4.5  3.5 - 5.1 mmol/L Final    Chloride 05/27/2024 109  95 - 110 mmol/L Final    CO2 05/27/2024 25  23 - 29 mmol/L Final    Glucose 05/27/2024 92  70 - 110 mg/dL Final    BUN 05/27/2024 13  8 - 23 mg/dL Final    Creatinine 05/27/2024 1.1  0.5 - 1.4 mg/dL Final    Calcium 05/27/2024 9.7  8.7 - 10.5 mg/dL Final    Total Protein 05/27/2024 7.2  6.0 - 8.4 g/dL Final    Albumin 05/27/2024 3.4 (L)  3.5 - 5.2 g/dL Final    Total Bilirubin 05/27/2024 1.3 (H)  0.1 - 1.0 mg/dL Final    Comment: For infants and newborns, interpretation of results should be based  on gestational age, weight and in agreement with clinical  observations.    Premature Infant recommended reference ranges:  Up to 24 hours.............<8.0 mg/dL  Up to 48 hours............<12.0 mg/dL  3-5 days..................<15.0 mg/dL  6-29 days.................<15.0 mg/dL      Alkaline Phosphatase 05/27/2024 120  55 - 135 U/L Final    AST 05/27/2024 28  10 - 40 U/L Final    ALT 05/27/2024 18  10 - 44 U/L Final    eGFR 05/27/2024 >60.0  >60 mL/min/1.73 m^2 Final    Anion Gap 05/27/2024 7 (L)  8 - 16 mmol/L Final    CEA 05/27/2024 3.5  0.0 - 5.0 ng/mL Final    Comment: CEA Normal Range:  Non-Smokers: 0-3.0 ng/mL  Smokers:     0-5.0 ng/mL    The testing method is a chemiluminescent microparticle immunoassay   manufactured by [employer] and performed on the [facility]   or   [employer] system. Values obtained with different assay manufacturers   for   methods may be different and cannot be used interchangeably.       Assessment and Plan        1. Metastatic colon cancer to liver    2. Immunodeficiency secondary to neoplasm    3. Immunodeficiency due to chemotherapy    4. Hypertension, unspecified  type    5. MARIA A (acute kidney injury)    6. Anemia associated with chemotherapy    7. Pain    8. Weight decrease    9. Drug-induced constipation    10. Severe malnutrition    11. Lower urinary tract symptoms (LUTS)    12. Hyperkalemia        1-3.  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  Guardant 360 was negative for BRAF, VIRI, HER2 amplification.   Pretreatment CEA 57.  We had a long and sonia discussion with him about his diagnosis. Unfortunately, the disease is not curable but remains treatable and he has good performance status.   Restaging scans after 7 cycles of FOLFOX + Pema showed significant reduction in colonic mass and liver mass.   we switched to maintenance as of cycle 8 with 5FU + Pema  Restaging scans from March 2022 show stable disease.   MRI on 7/18/22 showing hepatic progression of disease in the right hepatic lobe noted on the exam. CT CAP on 8/26 shows some mild progression in both liver metastases.    Discussed case at colorectal tumor board 8/31/22 and had a diagnostic lap performed by Dr. Rodriguez on 9/7 to assess for any occult peritoneal disease. Findings from the diagnostic lap were negative. Fluid from around from around the primary mass was sent for cytology that was negative for malignancy.   Underwent primary tumor resection on 10/20/22 with Dr. Bonilla.    Meanwhile his liver mets had grown.  We discussed his case at [facility] conference with plans for short term Y-90 and then restarting chemotherapy prior to considering any surgical options to his liver metastases.  He underwent Y-90 delivery on 12/30/22. Tolerated well.   CT CAP on 1/23/23 reviewed at [facility] conference with good response to Y-90, no new lesions.  Underwent second Y-90 on 1/27/23. Can consider R hepatectomy pending follow-up imaging after Y-90.     Received cycle 42 of FOLFOX (omitted oxaliplatin again starting cycle 41 due to neuropathy) on 2/9/23.  Because of 5-FU shortage nationally, we  have been holding chemotherapy since mid-February 2023.  If he needs to restart chemotherapy (I.e. no plans for surgical resection), will place him on capecitabine maintenance for duration of 5-FU shortage.     Discussed at colorectal liver mets tumor board 3/16/23.  Plan for repeat Y-90 and then consideration of surgical resection and he will meet with liver transplant team as well.  Underwent Y-90 delivery 5/17/23 with IR.     Has been on maintenance Xeloda since late April 2023.    Met with liver transplant team but ultimately opted not to proceed with transplant as he was concerned about how he would tolerate a major surgery with the life changes that would come after transplant as well. They closed out his case.    He met with Dr. Rodriguez to discuss whether surgical resection is indicated for his liver mets.  He recommended repeat MRI.  Repeat MRI unfortunately showed disease progression while on Xeloda maintenance.  Similarly his CEA garrett precipitously, all in keeping with worsening disease.    We recommended we restart IV chemotherapy with FOLFIRI + Avastin (never had irinotecan).    Because of hyperbilirubinemia he received cycle 1 with just 5-FU + Avastin on 7/11/23. Tolerated this well. Bilirubin has improved.  Received irinotecan starting with cycle 2.  Repeat CT CAP after cycle 4 shows good response to therapy.   Excellent tolerance. [facility] tumor board agrees with continuation of chemotherapy.   Repeat CT CAP after cycle 7 shows stable disease, no evidence of new or worsening disease.   Repeat CT CAP after cycle 11 shows stable disease.   Repeat CT CAP after cycle 15 shows improved disease.  Repeat CT CAP after cycle 21 shows stable disease.     Doing well today. Tolerating treatment well.   Labs reviewed and adequate for treatment.   Will proceed with cycle 24 today.   Repeat imaging after cycle 24.     -RTC in 2 weeks for next cycle with lab work/UA + scans     Tempus: APC, CKS1B cng, ERCC3 cnl, PIK3CA;  KENIA, TMB 12.1.    4. Hypertension   -- BP well controlled today. Feels well.   -- Following with PCP.   -- encouraged him and his daughter to monitor BP and HR closely at home.     5. MARIA A  -- Resolved   -- Monitor. Encouraged continued hydration particularly with working outside.     6. Anemia  -- Hgb stable. Monitor.  -- No signs of bleeding. Platelets normal.     7-10. Neoplasm related pain, weight loss, constipation, malnutrition  -- Pain very well controlled. Has oxycodone 5mg to use PRN but not requiring now.  -- Following with nutrition. Encouraged increased protein. Monitor.  -- Continue Miralax daily for constipation. Doing well with this.     11, Urinary Hesitancy.  -- Continue Flomax.   -- Reported improvement since starting medication.     12. Hyperkalemia  - K improved to 4.5 today. Pt feeling well. Will continue to monitor.     Patient is in agreement with the proposed treatment plan. All questions were answered to the patient's satisfaction. Pt knows to call clinic if anything is needed before the next clinic visit.    Patient discussed with collaborating physician, Dr. Schuster.    At least 40 minutes were spent today on this encounter including face to face time with the patient, data gathering/interpretation and documentation.       Natividad Maher, MSN, APRN, ACCNS-AG  Hematology and Medical Oncology  Clinical Nurse Specialist to Dr. Schuster, Dr. Quijano & Dr. Mueller    Route Chart for Scheduling    Med Onc Chart Routing      Follow up with physician . Keep as scheduled - thanks!   Follow up with RACHEL    Infusion scheduling note    Injection scheduling note    Labs    Imaging    Pharmacy appointment    Other referrals

## 2024-05-29 ENCOUNTER — INFUSION (OUTPATIENT)
Dept: INFUSION THERAPY | Facility: HOSPITAL | Age: 74
End: 2024-05-29
Payer: MEDICARE

## 2024-05-29 VITALS
DIASTOLIC BLOOD PRESSURE: 77 MMHG | SYSTOLIC BLOOD PRESSURE: 121 MMHG | HEART RATE: 65 BPM | RESPIRATION RATE: 17 BRPM | OXYGEN SATURATION: 98 % | TEMPERATURE: 98 F

## 2024-05-29 DIAGNOSIS — C78.7 METASTATIC COLON CANCER TO LIVER: Primary | ICD-10-CM

## 2024-05-29 DIAGNOSIS — C18.9 METASTATIC COLON CANCER TO LIVER: Primary | ICD-10-CM

## 2024-05-29 PROCEDURE — 25000003 PHARM REV CODE 250: Performed by: REGISTERED NURSE

## 2024-05-29 PROCEDURE — A4216 STERILE WATER/SALINE, 10 ML: HCPCS | Performed by: REGISTERED NURSE

## 2024-05-29 PROCEDURE — 63600175 PHARM REV CODE 636 W HCPCS: Performed by: REGISTERED NURSE

## 2024-05-29 RX ORDER — SODIUM CHLORIDE 0.9 % (FLUSH) 0.9 %
10 SYRINGE (ML) INJECTION
Status: DISCONTINUED | OUTPATIENT
Start: 2024-05-29 | End: 2024-05-29 | Stop reason: HOSPADM

## 2024-05-29 RX ORDER — HEPARIN 100 UNIT/ML
500 SYRINGE INTRAVENOUS
Status: DISCONTINUED | OUTPATIENT
Start: 2024-05-29 | End: 2024-05-29 | Stop reason: HOSPADM

## 2024-05-29 RX ADMIN — Medication 10 ML: at 12:05

## 2024-05-29 RX ADMIN — HEPARIN 500 UNITS: 100 SYRINGE at 12:05

## 2024-05-29 NOTE — PLAN OF CARE
Pt ambulatory to clinic with daughter for pump d/c. Pump completed pta. Port deaccessed after flushing. Denies any complaints. Ambulatory from clinic in NAD.

## 2024-06-06 ENCOUNTER — HOSPITAL ENCOUNTER (OUTPATIENT)
Dept: RADIOLOGY | Facility: HOSPITAL | Age: 74
Discharge: HOME | End: 2024-06-06
Attending: REGISTERED NURSE
Payer: MEDICARE

## 2024-06-06 DIAGNOSIS — C18.9 METASTATIC COLON CANCER TO LIVER: ICD-10-CM

## 2024-06-06 DIAGNOSIS — C78.7 METASTATIC COLON CANCER TO LIVER: ICD-10-CM

## 2024-06-06 PROCEDURE — A9698 NON-RAD CONTRAST MATERIALNOC: HCPCS | Performed by: REGISTERED NURSE

## 2024-06-06 PROCEDURE — 74177 CT ABD & PELVIS W/CONTRAST: CPT | Mod: 26,,, | Performed by: INTERNAL MEDICINE

## 2024-06-06 PROCEDURE — 71260 CT THORAX DX C+: CPT | Mod: 26,,, | Performed by: INTERNAL MEDICINE

## 2024-06-06 PROCEDURE — 25500020 PHARM REV CODE 255: Performed by: REGISTERED NURSE

## 2024-06-06 PROCEDURE — 74177 CT ABD & PELVIS W/CONTRAST: CPT | Mod: TC

## 2024-06-06 RX ADMIN — IOHEXOL 75 ML: 350 INJECTION, SOLUTION INTRAVENOUS at 09:06

## 2024-06-06 RX ADMIN — BARIUM SULFATE 450 ML: 20 SUSPENSION ORAL at 09:06

## 2024-06-10 ENCOUNTER — OFFICE VISIT (OUTPATIENT)
Dept: HEMATOLOGY/ONCOLOGY | Facility: CLINIC | Age: 74
End: 2024-06-10
Payer: MEDICARE

## 2024-06-10 ENCOUNTER — INFUSION (OUTPATIENT)
Dept: INFUSION THERAPY | Facility: HOSPITAL | Age: 74
End: 2024-06-10
Payer: MEDICARE

## 2024-06-10 VITALS
SYSTOLIC BLOOD PRESSURE: 118 MMHG | TEMPERATURE: 98 F | RESPIRATION RATE: 18 BRPM | HEIGHT: 67 IN | DIASTOLIC BLOOD PRESSURE: 67 MMHG | WEIGHT: 133.94 LBS | BODY MASS INDEX: 21.02 KG/M2 | HEART RATE: 62 BPM

## 2024-06-10 VITALS
BODY MASS INDEX: 21.02 KG/M2 | RESPIRATION RATE: 18 BRPM | WEIGHT: 133.94 LBS | DIASTOLIC BLOOD PRESSURE: 63 MMHG | HEART RATE: 86 BPM | SYSTOLIC BLOOD PRESSURE: 105 MMHG | HEIGHT: 67 IN | OXYGEN SATURATION: 100 % | TEMPERATURE: 98 F

## 2024-06-10 DIAGNOSIS — R63.4 WEIGHT DECREASE: ICD-10-CM

## 2024-06-10 DIAGNOSIS — N17.9 AKI (ACUTE KIDNEY INJURY): ICD-10-CM

## 2024-06-10 DIAGNOSIS — T45.1X5A IMMUNODEFICIENCY DUE TO CHEMOTHERAPY: ICD-10-CM

## 2024-06-10 DIAGNOSIS — C78.7 METASTATIC COLON CANCER TO LIVER: Primary | ICD-10-CM

## 2024-06-10 DIAGNOSIS — K59.03 DRUG-INDUCED CONSTIPATION: ICD-10-CM

## 2024-06-10 DIAGNOSIS — R52 PAIN: ICD-10-CM

## 2024-06-10 DIAGNOSIS — E43 SEVERE MALNUTRITION: ICD-10-CM

## 2024-06-10 DIAGNOSIS — Z79.899 IMMUNODEFICIENCY DUE TO CHEMOTHERAPY: ICD-10-CM

## 2024-06-10 DIAGNOSIS — D64.81 ANEMIA ASSOCIATED WITH CHEMOTHERAPY: ICD-10-CM

## 2024-06-10 DIAGNOSIS — D84.81 IMMUNODEFICIENCY SECONDARY TO NEOPLASM: ICD-10-CM

## 2024-06-10 DIAGNOSIS — R39.9 LOWER URINARY TRACT SYMPTOMS (LUTS): ICD-10-CM

## 2024-06-10 DIAGNOSIS — C18.9 METASTATIC COLON CANCER TO LIVER: Primary | ICD-10-CM

## 2024-06-10 DIAGNOSIS — E87.5 HYPERKALEMIA: ICD-10-CM

## 2024-06-10 DIAGNOSIS — D84.821 IMMUNODEFICIENCY DUE TO CHEMOTHERAPY: ICD-10-CM

## 2024-06-10 DIAGNOSIS — T45.1X5A ANEMIA ASSOCIATED WITH CHEMOTHERAPY: ICD-10-CM

## 2024-06-10 DIAGNOSIS — I10 HYPERTENSION, UNSPECIFIED TYPE: ICD-10-CM

## 2024-06-10 DIAGNOSIS — D49.9 IMMUNODEFICIENCY SECONDARY TO NEOPLASM: ICD-10-CM

## 2024-06-10 PROCEDURE — 1101F PT FALLS ASSESS-DOCD LE1/YR: CPT | Mod: CPTII,S$GLB,, | Performed by: INTERNAL MEDICINE

## 2024-06-10 PROCEDURE — 63600175 PHARM REV CODE 636 W HCPCS: Performed by: INTERNAL MEDICINE

## 2024-06-10 PROCEDURE — G2211 COMPLEX E/M VISIT ADD ON: HCPCS | Mod: S$GLB,,, | Performed by: INTERNAL MEDICINE

## 2024-06-10 PROCEDURE — 25000003 PHARM REV CODE 250: Performed by: INTERNAL MEDICINE

## 2024-06-10 PROCEDURE — 99999 PR PBB SHADOW E&M-EST. PATIENT-LVL III: CPT | Mod: PBBFAC,,, | Performed by: INTERNAL MEDICINE

## 2024-06-10 PROCEDURE — 1126F AMNT PAIN NOTED NONE PRSNT: CPT | Mod: CPTII,S$GLB,, | Performed by: INTERNAL MEDICINE

## 2024-06-10 PROCEDURE — 1159F MED LIST DOCD IN RCRD: CPT | Mod: CPTII,S$GLB,, | Performed by: INTERNAL MEDICINE

## 2024-06-10 PROCEDURE — 3008F BODY MASS INDEX DOCD: CPT | Mod: CPTII,S$GLB,, | Performed by: INTERNAL MEDICINE

## 2024-06-10 PROCEDURE — 96415 CHEMO IV INFUSION ADDL HR: CPT

## 2024-06-10 PROCEDURE — 96413 CHEMO IV INFUSION 1 HR: CPT

## 2024-06-10 PROCEDURE — 3074F SYST BP LT 130 MM HG: CPT | Mod: CPTII,S$GLB,, | Performed by: INTERNAL MEDICINE

## 2024-06-10 PROCEDURE — 99215 OFFICE O/P EST HI 40 MIN: CPT | Mod: S$GLB,,, | Performed by: INTERNAL MEDICINE

## 2024-06-10 PROCEDURE — 96416 CHEMO PROLONG INFUSE W/PUMP: CPT

## 2024-06-10 PROCEDURE — A4216 STERILE WATER/SALINE, 10 ML: HCPCS | Performed by: INTERNAL MEDICINE

## 2024-06-10 PROCEDURE — 96417 CHEMO IV INFUS EACH ADDL SEQ: CPT

## 2024-06-10 PROCEDURE — 3288F FALL RISK ASSESSMENT DOCD: CPT | Mod: CPTII,S$GLB,, | Performed by: INTERNAL MEDICINE

## 2024-06-10 PROCEDURE — 3078F DIAST BP <80 MM HG: CPT | Mod: CPTII,S$GLB,, | Performed by: INTERNAL MEDICINE

## 2024-06-10 PROCEDURE — 96367 TX/PROPH/DG ADDL SEQ IV INF: CPT

## 2024-06-10 PROCEDURE — 96375 TX/PRO/DX INJ NEW DRUG ADDON: CPT

## 2024-06-10 RX ORDER — HEPARIN 100 UNIT/ML
500 SYRINGE INTRAVENOUS
Status: DISCONTINUED | OUTPATIENT
Start: 2024-06-10 | End: 2024-06-10 | Stop reason: HOSPADM

## 2024-06-10 RX ORDER — EPINEPHRINE 0.3 MG/.3ML
0.3 INJECTION SUBCUTANEOUS ONCE AS NEEDED
Status: CANCELLED | OUTPATIENT
Start: 2024-06-12

## 2024-06-10 RX ORDER — ATROPINE SULFATE 0.4 MG/ML
0.4 INJECTION, SOLUTION ENDOTRACHEAL; INTRAMEDULLARY; INTRAMUSCULAR; INTRAVENOUS; SUBCUTANEOUS ONCE AS NEEDED
Status: CANCELLED | OUTPATIENT
Start: 2024-06-12

## 2024-06-10 RX ORDER — DIPHENHYDRAMINE HYDROCHLORIDE 50 MG/ML
50 INJECTION INTRAMUSCULAR; INTRAVENOUS ONCE AS NEEDED
Status: CANCELLED | OUTPATIENT
Start: 2024-06-12

## 2024-06-10 RX ORDER — ATROPINE SULFATE 0.4 MG/ML
0.4 INJECTION, SOLUTION ENDOTRACHEAL; INTRAMEDULLARY; INTRAMUSCULAR; INTRAVENOUS; SUBCUTANEOUS ONCE AS NEEDED
Status: COMPLETED | OUTPATIENT
Start: 2024-06-10 | End: 2024-06-10

## 2024-06-10 RX ORDER — DIPHENHYDRAMINE HYDROCHLORIDE 50 MG/ML
50 INJECTION INTRAMUSCULAR; INTRAVENOUS ONCE AS NEEDED
Status: DISCONTINUED | OUTPATIENT
Start: 2024-06-10 | End: 2024-06-10 | Stop reason: HOSPADM

## 2024-06-10 RX ORDER — SODIUM CHLORIDE 0.9 % (FLUSH) 0.9 %
10 SYRINGE (ML) INJECTION
Status: DISCONTINUED | OUTPATIENT
Start: 2024-06-10 | End: 2024-06-10 | Stop reason: HOSPADM

## 2024-06-10 RX ORDER — EPINEPHRINE 0.3 MG/.3ML
0.3 INJECTION SUBCUTANEOUS ONCE AS NEEDED
Status: DISCONTINUED | OUTPATIENT
Start: 2024-06-10 | End: 2024-06-10 | Stop reason: HOSPADM

## 2024-06-10 RX ORDER — PROCHLORPERAZINE EDISYLATE 5 MG/ML
5 INJECTION INTRAMUSCULAR; INTRAVENOUS ONCE AS NEEDED
Status: DISCONTINUED | OUTPATIENT
Start: 2024-06-10 | End: 2024-06-10 | Stop reason: HOSPADM

## 2024-06-10 RX ORDER — SODIUM CHLORIDE 0.9 % (FLUSH) 0.9 %
10 SYRINGE (ML) INJECTION
Status: CANCELLED | OUTPATIENT
Start: 2024-06-12

## 2024-06-10 RX ORDER — SODIUM CHLORIDE 0.9 % (FLUSH) 0.9 %
10 SYRINGE (ML) INJECTION
Status: CANCELLED | OUTPATIENT
Start: 2024-06-14

## 2024-06-10 RX ORDER — PROCHLORPERAZINE EDISYLATE 5 MG/ML
5 INJECTION INTRAMUSCULAR; INTRAVENOUS ONCE AS NEEDED
Status: CANCELLED
Start: 2024-06-12

## 2024-06-10 RX ORDER — HEPARIN 100 UNIT/ML
500 SYRINGE INTRAVENOUS
Status: CANCELLED | OUTPATIENT
Start: 2024-06-12

## 2024-06-10 RX ORDER — HEPARIN 100 UNIT/ML
500 SYRINGE INTRAVENOUS
Status: CANCELLED | OUTPATIENT
Start: 2024-06-14

## 2024-06-10 RX ADMIN — SODIUM CHLORIDE: 9 INJECTION, SOLUTION INTRAVENOUS at 08:06

## 2024-06-10 RX ADMIN — FLUOROURACIL 3695 MG: 50 INJECTION, SOLUTION INTRAVENOUS at 12:06

## 2024-06-10 RX ADMIN — Medication 10 ML: at 12:06

## 2024-06-10 RX ADMIN — BEVACIZUMAB-AWWB 300 MG: 100 INJECTION, SOLUTION INTRAVENOUS at 09:06

## 2024-06-10 RX ADMIN — ATROPINE SULFATE 0.4 MG: 0.4 INJECTION, SOLUTION INTRAVENOUS at 10:06

## 2024-06-10 RX ADMIN — IRINOTECAN HYDROCHLORIDE 240 MG: 20 INJECTION, SOLUTION INTRAVENOUS at 10:06

## 2024-06-10 RX ADMIN — DEXAMETHASONE SODIUM PHOSPHATE 0.25 MG: 4 INJECTION, SOLUTION INTRA-ARTICULAR; INTRALESIONAL; INTRAMUSCULAR; INTRAVENOUS; SOFT TISSUE at 09:06

## 2024-06-10 NOTE — PROGRESS NOTES
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 Bonilla  12/30/2022: Y-90 to R liver  1/27/2023: Y-90 to R liver  5/17/2023: Y-90 to R liver  7/11/2023: C1 FOLFIRI + Pema  7/26/2023: C2 FOLFIRI + Pema  8/8/2023: C3 FOLFIRI + Pema  8/22/23: C4 FOLFIRI + Pema  Restaging CT CAP 9/1/23: Good response to chemo.  9/7/23: C5 FOLFIRI + Pema  ......................................  Restaging CT CAP 10/12/23: Good response to chemo  10/16/23: C8 FOLFIRI + Pema  .......................................  Restaging CT CAP 12/8/23: Good response to chemo  12/11/23: C12 FOLFIRI + Pema  .......................................  Restaging CT CAP 2/5/24: Good response to chemo  2/8/24: C16 FOLFIRI + Pema    Restaging CT CAP 4/26/24: Good response to chemo  Restaging CT CAP 6/6/24: Good response to chemo      Interval History:   He presents today with his daughter prior to cycle 25 of FOLFIRI plus avastin. He feels well overall.  He has no significant diarrhea.  Very mild intermittent nausea.  Mild fatigue.  Today is his birthday.  Denies any pain.    ECOG status is 1. Presents with his daughter today.    Past Medical History:   Past Medical History:   Diagnosis Date    MARIA A (acute kidney injury) 8/18/2022    Hypertension     Metastatic colon cancer to liver 4/22/2021     Past Surgical  HIstory:   Past Surgical History:   Procedure Laterality Date    COLONOSCOPY N/A 2021    Procedure: COLONOSCOPY with possible stent;  Surgeon: EDILMA Bonilla MD;  Location: [facility] ENDO (2ND FLR);  Service: Colon and Rectal;  Laterality: N/A;    COLONOSCOPY N/A 11/3/2023    Procedure: COLONOSCOPY;  Surgeon: EDILMA Bonilla MD;  Location: [facility] ENDO (4TH FLR);  Service: Endoscopy;  Laterality: N/A;  prep instructions sent to pt via portal  From: EDILMA Bonilla MD  Procedure: Colonoscopy  Diagnosis: Surveillance colonoscopy - Hx of colon cancer  Procedure Timin-12 weeks  Provider: Myself  Location: [facility]o  Additional Scheduling Information: No scheduling con    DIAGNOSTIC LAPAROSCOPY N/A 2022    Procedure: LAPAROSCOPY, DIAGNOSTIC;  Surgeon: Adrian Rodriguez MD;  Location: [facility] OR 2ND FLR;  Service: General;  Laterality: N/A;    INSERTION OF TUNNELED CENTRAL VENOUS CATHETER (CVC) WITH SUBCUTANEOUS PORT N/A 5/3/2021    Procedure: [id number];  Surgeon: Francisco Layton MD;  Location: [facility] OR 2ND FLR;  Service: Vascular;  Laterality: N/A;    OMENTECTOMY N/A 10/20/2022    Procedure: OMENTECTOMY;  Surgeon: EDILMA Bonilla MD;  [facility];  Service: Colon and Rectal;  Laterality: N/A;    RIGHT HEMICOLECTOMY N/A 10/20/2022    Procedure: HEMICOLECTOMY, RIGHT, extended;  Surgeon: EDILMA Bonilla MD;  [facility];  Service: Colon and Rectal;  Laterality: N/A;  Extended right hemicolectomy CONSENT IN AM     Family History:   Family History   Problem Relation Name Age of Onset    Cancer Brother         Social History:  reports that he has never smoked. He has never used smokeless tobacco. He reports that he does not currently use alcohol. He reports that he does not use drugs.    Allergies:  Review of patient's allergies indicates:  No Known Allergies    Medications:  Current Outpatient Medications   Medication Sig Dispense Refill    acetaminophen (TYLENOL) 500 MG  tablet Take 1 tablet (500 mg total) by mouth every 6 (six) hours as needed for Pain (alternate with ibuprofen).  0    amLODIPine (NORVASC) 10 MG tablet Take 1 tablet (10 mg total) by mouth once daily. 90 tablet 3    ibuprofen (ADVIL,MOTRIN) 400 MG tablet Take 1 tablet (400 mg total) by mouth every 6 (six) hours as needed for Other (pain). Alternate with tylenol      LIDOcaine-prilocaine (EMLA) cream Apply topically as needed (port application). 30 g 3    ondansetron (ZOFRAN) 4 MG tablet Take 1 tablet (4 mg total) by mouth every 8 (eight) hours as needed for Nausea. 30 tablet 3    oxyCODONE (ROXICODONE) 5 MG immediate release tablet Take 1 tablet (5 mg total) by mouth every 6 (six) hours as needed for Pain. 20 tablet 0    tamsulosin (FLOMAX) 0.4 mg Cap Take 1 capsule (0.4 mg total) by mouth once daily. 30 capsule 5     No current facility-administered medications for this visit.     Review of Systems   Constitutional:  Negative for activity change, appetite change, chills, diaphoresis, fatigue, fever and unexpected weight change.   HENT:  Negative for congestion, mouth sores, nosebleeds, postnasal drip, rhinorrhea, trouble swallowing and voice change.    Eyes:  Negative for pain and visual disturbance.   Respiratory:  Negative for cough, chest tightness, shortness of breath and wheezing.    Cardiovascular:  Negative for chest pain, palpitations and leg swelling.   Gastrointestinal:  Negative for abdominal distention, abdominal pain, blood in stool, constipation, diarrhea, nausea and vomiting.   Genitourinary:  Negative for difficulty urinating, dysuria, flank pain, frequency, hematuria and urgency.   Musculoskeletal:  Negative for arthralgias, back pain and myalgias.   Skin:  Negative for rash and wound.   Neurological:  Negative for dizziness, facial asymmetry, weakness, light-headedness, numbness and headaches.   Psychiatric/Behavioral:  Negative for agitation, behavioral problems, confusion, decreased  "concentration, dysphoric mood and sleep disturbance. The patient is not nervous/anxious.    All other systems reviewed and are negative.    ECOG Performance Status: 1     Objective:      Vitals:   Vitals:    06/10/24 0801   BP: 105/63   Pulse: 86   Resp: 18   Temp: 98.3 °F (36.8 °C)   TempSrc: Oral   SpO2: 100%   Weight: 60.7 kg (133 lb 14.9 oz)   Height: 5' 7" (1.702 m)         Physical Exam  Vitals reviewed.   Constitutional:       General: He is not in acute distress.     Appearance: Normal appearance. He is not ill-appearing, toxic-appearing or diaphoretic.   HENT:      Head: Normocephalic and atraumatic.      Right Ear: External ear normal.      Left Ear: External ear normal.      Nose: Nose normal.      Mouth/Throat:      Pharynx: Oropharynx is clear.   Eyes:      General: No scleral icterus.     Extraocular Movements: Extraocular movements intact.      Conjunctiva/sclera: Conjunctivae normal.      Pupils: Pupils are equal, round, and reactive to light.   Cardiovascular:      Rate and Rhythm: Normal rate and regular rhythm.      Pulses: Normal pulses.      Heart sounds: Normal heart sounds. No murmur heard.  Pulmonary:      Effort: Pulmonary effort is normal. No respiratory distress.      Breath sounds: Normal breath sounds. No wheezing.   Chest:      Comments: Port to RCW, no signs of infection.  Abdominal:      General: Abdomen is flat. Bowel sounds are normal. There is no distension.      Palpations: Abdomen is soft. There is no mass.      Tenderness: There is no abdominal tenderness.      Comments: Vertical midline abdominal wound well healed   Musculoskeletal:         General: No swelling or deformity. Normal range of motion.      Right lower leg: No edema.      Left lower leg: No edema.   Skin:     Coloration: Skin is not jaundiced or pale.      Findings: No bruising, erythema or rash.   Neurological:      General: No focal deficit present.      Mental Status: He is alert and oriented to person, place, " and time. Mental status is at baseline.      Cranial Nerves: No cranial nerve deficit.      Sensory: No sensory deficit.      Gait: Gait normal.   Psychiatric:         Mood and Affect: Mood normal.         Behavior: Behavior normal.         Thought Content: Thought content normal.         Judgment: Judgment normal.     Laboratory Data:  Lab Visit on 06/06/2024   Component Date Value Ref Range Status    Specimen UA 06/06/2024 Urine, Clean Catch   Final    Color, UA 06/06/2024 Yellow  Yellow, Straw, [place] Final    Appearance, UA 06/06/2024 Hazy (A)  Clear Final    pH, UA 06/06/2024 6.0  5.0 - 8.0 Final    Specific Gravity, UA 06/06/2024 1.030  1.005 - 1.030 Final    Protein, UA 06/06/2024 1+ (A)  Negative Final    Comment: Recommend a 24 hour urine protein or a urine   protein/creatinine ratio if globulin induced proteinuria is  clinically suspected.      Glucose, UA 06/06/2024 Negative  Negative Final    Ketones, UA 06/06/2024 Negative  Negative Final    Bilirubin (UA) 06/06/2024 Negative  Negative Final    Occult Blood UA 06/06/2024 Negative  Negative Final    Nitrite, UA 06/06/2024 Negative  Negative Final    Leukocytes, UA 06/06/2024 Negative  Negative Final    RBC, UA 06/06/2024 0  0 - 4 /hpf Final    WBC, UA 06/06/2024 1  0 - 5 /hpf Final    Bacteria 06/06/2024 Few (A)  None-Occ /hpf Final    Squam Epithel, UA 06/06/2024 1  /hpf Final    Hyaline Casts, UA 06/06/2024 0  0-1/lpf /lpf Final    Microscopic Comment 06/06/2024 SEE COMMENT   Final    Comment: Other formed elements not mentioned in the report are not   present in the microscopic examination.      Lab Visit on 06/06/2024   Component Date Value Ref Range Status    CEA 06/06/2024 4.0  0.0 - 5.0 ng/mL Final    Comment: CEA Normal Range:  Non-Smokers: 0-3.0 ng/mL  Smokers:     0-5.0 ng/mL    The testing method is a chemiluminescent microparticle immunoassay   manufactured by [employer] and performed on the [employer]   or   [employer] system. Values  obtained with different assay manufacturers   for   methods may be different and cannot be used interchangeably.      Sodium 06/06/2024 138  136 - 145 mmol/L Final    Potassium 06/06/2024 4.5  3.5 - 5.1 mmol/L Final    Chloride 06/06/2024 105  95 - 110 mmol/L Final    CO2 06/06/2024 26  23 - 29 mmol/L Final    Glucose 06/06/2024 88  70 - 110 mg/dL Final    BUN 06/06/2024 10  8 - 23 mg/dL Final    Creatinine 06/06/2024 0.9  0.5 - 1.4 mg/dL Final    Calcium 06/06/2024 8.8  8.7 - 10.5 mg/dL Final    Total Protein 06/06/2024 6.7  6.0 - 8.4 g/dL Final    Albumin 06/06/2024 3.1 (L)  3.5 - 5.2 g/dL Final    Total Bilirubin 06/06/2024 1.1 (H)  0.1 - 1.0 mg/dL Final    Comment: For infants and newborns, interpretation of results should be based  on gestational age, weight and in agreement with clinical  observations.    Premature Infant recommended reference ranges:  Up to 24 hours.............<8.0 mg/dL  Up to 48 hours............<12.0 mg/dL  3-5 days..................<15.0 mg/dL  6-29 days.................<15.0 mg/dL      Alkaline Phosphatase 06/06/2024 136 (H)  55 - 135 U/L Final    AST 06/06/2024 29  10 - 40 U/L Final    ALT 06/06/2024 22  10 - 44 U/L Final    eGFR 06/06/2024 >60.0  >60 mL/min/1.73 m^2 Final    Anion Gap 06/06/2024 7 (L)  8 - 16 mmol/L Final    WBC 06/06/2024 3.32 (L)  3.90 - 12.70 K/uL Final    RBC 06/06/2024 4.12 (L)  4.60 - 6.20 M/uL Final    Hemoglobin 06/06/2024 10.0 (L)  14.0 - 18.0 g/dL Final    Hematocrit 06/06/2024 35.0 (L)  40.0 - 54.0 % Final    MCV 06/06/2024 85  82 - 98 fL Final    MCH 06/06/2024 24.3 (L)  27.0 - 31.0 pg Final    MCHC 06/06/2024 28.6 (L)  32.0 - 36.0 g/dL Final    RDW 06/06/2024 19.2 (H)  11.5 - 14.5 % Final    Platelets 06/06/2024 164  150 - 450 K/uL Final    MPV 06/06/2024 9.5  9.2 - 12.9 fL Final    Gran # (ANC) 06/06/2024 2.1  1.8 - 7.7 K/uL Final    Comment: The ANC is based on a white cell differential from an   automated cell counter. It has not been microscopically    reviewed for the presence of abnormal cells. Clinical   correlation is required.      Immature Grans (Abs) 06/06/2024 0.01  0.00 - 0.04 K/uL Final    Comment: Mild elevation in immature granulocytes is non specific and   can be seen in a variety of conditions including stress response,   acute inflammation, trauma and pregnancy. Correlation with other   laboratory and clinical findings is essential.       Assessment and Plan        1. Metastatic colon cancer to liver    2. Immunodeficiency secondary to neoplasm    3. Immunodeficiency due to chemotherapy    4. Hypertension, unspecified type    5. MARIA A (acute kidney injury)    6. Anemia associated with chemotherapy    7. Pain    8. Weight decrease    9. Drug-induced constipation    10. Severe malnutrition    11. Lower urinary tract symptoms (LUTS)    12. Hyperkalemia          1-3.  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  Guardant 360 was negative for BRAF, VIRI, HER2 amplification.   Pretreatment CEA 57.  We had a long and sonia discussion with him about his diagnosis. Unfortunately, the disease is not curable but remains treatable and he has good performance status.   Restaging scans after 7 cycles of FOLFOX + Pema showed significant reduction in colonic mass and liver mass.   we switched to maintenance as of cycle 8 with 5FU + Pema  Restaging scans from March 2022 show stable disease.   MRI on 7/18/22 showing hepatic progression of disease in the right hepatic lobe noted on the exam. CT CAP on 8/26 shows some mild progression in both liver metastases.    Discussed case at colorectal tumor board 8/31/22 and had a diagnostic lap performed by Dr. Rodriguez on 9/7 to assess for any occult peritoneal disease. Findings from the diagnostic lap were negative. Fluid from around from around the primary mass was sent for cytology that was negative for malignancy.   Underwent primary tumor resection on 10/20/22 with Dr. Bonilla.    Meanwhile  his liver mets had grown.  We discussed his case at [facility] conference with plans for short term Y-90 and then restarting chemotherapy prior to considering any surgical options to his liver metastases.  He underwent Y-90 delivery on 12/30/22. Tolerated well.   CT CAP on 1/23/23 reviewed at [facility] conference with good response to Y-90, no new lesions.  Underwent second Y-90 on 1/27/23. Can consider R hepatectomy pending follow-up imaging after Y-90.     Received cycle 42 of FOLFOX (omitted oxaliplatin again starting cycle 41 due to neuropathy) on 2/9/23.  Because of 5-FU shortage nationally, we have been holding chemotherapy since mid-February 2023.  If he needs to restart chemotherapy (I.e. no plans for surgical resection), will place him on capecitabine maintenance for duration of 5-FU shortage.     Discussed at colorectal liver mets tumor board 3/16/23.  Plan for repeat Y-90 and then consideration of surgical resection and he will meet with liver transplant team as well.  Underwent Y-90 delivery 5/17/23 with IR.     Has been on maintenance Xeloda since late April 2023.    Met with liver transplant team but ultimately opted not to proceed with transplant as he was concerned about how he would tolerate a major surgery with the life changes that would come after transplant as well. They closed out his case.    He met with Dr. Rodriguez to discuss whether surgical resection is indicated for his liver mets.  He recommended repeat MRI.  Repeat MRI unfortunately showed disease progression while on Xeloda maintenance.  Similarly his CEA garrett precipitously, all in keeping with worsening disease.    We recommended we restart IV chemotherapy with FOLFIRI + Avastin (never had irinotecan).    Because of hyperbilirubinemia he received cycle 1 with just 5-FU + Avastin on 7/11/23. Tolerated this well. Bilirubin has improved.  Received irinotecan starting with cycle 2.  Repeat CT CAP after cycle 4 shows good response to therapy.    Excellent tolerance. [facility] tumor board agrees with continuation of chemotherapy.   Repeat CT CAP after cycle 7 shows stable disease, no evidence of new or worsening disease.   Repeat CT CAP after cycle 11 shows stable disease.   Repeat CT CAP after cycle 15 shows improved disease.  Repeat CT CAP after cycle 21 shows stable disease.   Repeat CT CAP after cycle 24 shows stable disease.    Doing well today. Tolerating treatment well.   Labs reviewed and adequate for treatment.   Will proceed with cycle 25 today.   Repeat imaging after cycle 28.     -RTC in 2 weeks for next cycle with lab work/UA     Tempus: APC, CKS1B cng, ERCC3 cnl, PIK3CA; KENIA, TMB 12.1.    4. Hypertension   -- BP well controlled today. Feels well.   -- Following with PCP.   -- encouraged him and his daughter to monitor BP and HR closely at home.     5. MARIA A  -- Resolved   -- Monitor. Encouraged continued hydration particularly with working outside.     6. Anemia  -- Hgb stable. Monitor.  -- No signs of bleeding. Platelets normal.     7-10. Neoplasm related pain, weight loss, constipation, malnutrition  -- Pain very well controlled. Has oxycodone 5mg to use PRN but not requiring now.  -- Following with nutrition. Encouraged increased protein. Monitor.  -- Continue Miralax daily for constipation. Doing well with this.     11, Urinary Hesitancy.  -- Continue Flomax.   -- Reported improvement since starting medication.     12. Hyperkalemia  - K improved to 4.5 today. Monitor.    Patient is in agreement with the proposed treatment plan. All questions were answered to the patient's satisfaction. Pt knows to call clinic if anything is needed before the next clinic visit.    Bunny Schuster MD  Hematology/Oncology  [facility]      Route Chart for Scheduling    Med Onc Chart Routing      Follow up with physician 4 weeks. for FOLFIRI + Avastin   Follow up with RACHEL 2 weeks. for FOLFIRI + Avastin   Infusion scheduling note    Injection  scheduling note    Labs CBC, CMP, CEA and urinalysis   Scheduling:  Preferred lab:  Lab interval: every 2 weeks     Imaging    Pharmacy appointment    Other referrals

## 2024-06-10 NOTE — PLAN OF CARE
1223-Pt tolerated MVASI and Folfiri well today, no complaints or complications. Connected to CADD pump for home infusion, connection sites secured, pump infusing properly at time of discharge. Pt aware to call provider with any questions or concerns, knows to return to clinic on Wednesday June 12, 2024 at approx 1000 for pump d/c, verbalized understanding. Pt ambulatory from clinic with steady gait, no distress noted.

## 2024-06-12 ENCOUNTER — INFUSION (OUTPATIENT)
Dept: INFUSION THERAPY | Facility: HOSPITAL | Age: 74
End: 2024-06-12
Payer: MEDICARE

## 2024-06-12 VITALS
BODY MASS INDEX: 21.02 KG/M2 | RESPIRATION RATE: 18 BRPM | HEART RATE: 73 BPM | TEMPERATURE: 98 F | HEIGHT: 67 IN | WEIGHT: 133.94 LBS | DIASTOLIC BLOOD PRESSURE: 77 MMHG | SYSTOLIC BLOOD PRESSURE: 124 MMHG

## 2024-06-12 DIAGNOSIS — C78.7 METASTATIC COLON CANCER TO LIVER: Primary | ICD-10-CM

## 2024-06-12 DIAGNOSIS — C18.9 METASTATIC COLON CANCER TO LIVER: Primary | ICD-10-CM

## 2024-06-12 PROCEDURE — A4216 STERILE WATER/SALINE, 10 ML: HCPCS | Performed by: INTERNAL MEDICINE

## 2024-06-12 PROCEDURE — 25000003 PHARM REV CODE 250: Performed by: INTERNAL MEDICINE

## 2024-06-12 PROCEDURE — 63600175 PHARM REV CODE 636 W HCPCS: Performed by: INTERNAL MEDICINE

## 2024-06-12 RX ORDER — SODIUM CHLORIDE 0.9 % (FLUSH) 0.9 %
10 SYRINGE (ML) INJECTION
Status: DISCONTINUED | OUTPATIENT
Start: 2024-06-12 | End: 2024-06-12 | Stop reason: HOSPADM

## 2024-06-12 RX ORDER — HEPARIN 100 UNIT/ML
500 SYRINGE INTRAVENOUS
Status: DISCONTINUED | OUTPATIENT
Start: 2024-06-12 | End: 2024-06-12 | Stop reason: HOSPADM

## 2024-06-12 RX ADMIN — Medication 10 ML: at 10:06

## 2024-06-12 RX ADMIN — HEPARIN 500 UNITS: 100 SYRINGE at 10:06

## 2024-06-12 NOTE — PLAN OF CARE
Patient arrived with CADD pump completed. NAD noted. VSS. PAC + blood return upon deaccess. Discharged home

## 2024-06-19 DIAGNOSIS — C78.7 METASTATIC COLON CANCER TO LIVER: Primary | ICD-10-CM

## 2024-06-19 DIAGNOSIS — C18.9 METASTATIC COLON CANCER TO LIVER: Primary | ICD-10-CM

## 2024-06-24 ENCOUNTER — OFFICE VISIT (OUTPATIENT)
Dept: HEMATOLOGY/ONCOLOGY | Facility: CLINIC | Age: 74
DRG: 378 | End: 2024-06-24
Payer: MEDICARE

## 2024-06-24 ENCOUNTER — HOSPITAL ENCOUNTER (INPATIENT)
Facility: HOSPITAL | Age: 74
LOS: 1 days | Discharge: HOME | DRG: 378 | End: 2024-06-26
Attending: EMERGENCY MEDICINE | Admitting: HOSPITALIST
Payer: MEDICARE

## 2024-06-24 ENCOUNTER — INFUSION (OUTPATIENT)
Dept: INFUSION THERAPY | Facility: HOSPITAL | Age: 74
End: 2024-06-24
Payer: MEDICARE

## 2024-06-24 VITALS
OXYGEN SATURATION: 100 % | HEART RATE: 71 BPM | TEMPERATURE: 98 F | DIASTOLIC BLOOD PRESSURE: 76 MMHG | SYSTOLIC BLOOD PRESSURE: 123 MMHG | RESPIRATION RATE: 16 BRPM

## 2024-06-24 VITALS
RESPIRATION RATE: 16 BRPM | DIASTOLIC BLOOD PRESSURE: 66 MMHG | BODY MASS INDEX: 21.35 KG/M2 | WEIGHT: 136 LBS | TEMPERATURE: 99 F | HEIGHT: 67 IN | SYSTOLIC BLOOD PRESSURE: 113 MMHG | OXYGEN SATURATION: 99 % | HEART RATE: 72 BPM

## 2024-06-24 DIAGNOSIS — C18.9 METASTATIC COLON CANCER TO LIVER: Primary | ICD-10-CM

## 2024-06-24 DIAGNOSIS — D49.9 IMMUNODEFICIENCY SECONDARY TO NEOPLASM: ICD-10-CM

## 2024-06-24 DIAGNOSIS — D84.821 IMMUNODEFICIENCY DUE TO CHEMOTHERAPY: ICD-10-CM

## 2024-06-24 DIAGNOSIS — K92.2 GASTROINTESTINAL HEMORRHAGE, UNSPECIFIED GASTROINTESTINAL HEMORRHAGE TYPE: Primary | ICD-10-CM

## 2024-06-24 DIAGNOSIS — C78.7 METASTATIC COLON CANCER TO LIVER: Primary | ICD-10-CM

## 2024-06-24 DIAGNOSIS — K59.03 DRUG-INDUCED CONSTIPATION: ICD-10-CM

## 2024-06-24 DIAGNOSIS — R07.9 CHEST PAIN: ICD-10-CM

## 2024-06-24 DIAGNOSIS — T45.1X5A ANEMIA ASSOCIATED WITH CHEMOTHERAPY: ICD-10-CM

## 2024-06-24 DIAGNOSIS — D64.81 ANEMIA ASSOCIATED WITH CHEMOTHERAPY: ICD-10-CM

## 2024-06-24 DIAGNOSIS — R63.4 WEIGHT DECREASE: ICD-10-CM

## 2024-06-24 DIAGNOSIS — R39.9 LOWER URINARY TRACT SYMPTOMS (LUTS): ICD-10-CM

## 2024-06-24 DIAGNOSIS — Z79.899 IMMUNODEFICIENCY DUE TO CHEMOTHERAPY: ICD-10-CM

## 2024-06-24 DIAGNOSIS — I10 HYPERTENSION, UNSPECIFIED TYPE: ICD-10-CM

## 2024-06-24 DIAGNOSIS — E87.5 HYPERKALEMIA: ICD-10-CM

## 2024-06-24 DIAGNOSIS — T45.1X5A IMMUNODEFICIENCY DUE TO CHEMOTHERAPY: ICD-10-CM

## 2024-06-24 DIAGNOSIS — D84.81 IMMUNODEFICIENCY SECONDARY TO NEOPLASM: ICD-10-CM

## 2024-06-24 DIAGNOSIS — K92.2 LOWER GI BLEED: ICD-10-CM

## 2024-06-24 DIAGNOSIS — E43 SEVERE MALNUTRITION: ICD-10-CM

## 2024-06-24 DIAGNOSIS — R52 PAIN: ICD-10-CM

## 2024-06-24 DIAGNOSIS — N17.9 AKI (ACUTE KIDNEY INJURY): ICD-10-CM

## 2024-06-24 PROCEDURE — 1159F MED LIST DOCD IN RCRD: CPT | Mod: CPTII,S$GLB,, | Performed by: INTERNAL MEDICINE

## 2024-06-24 PROCEDURE — 3288F FALL RISK ASSESSMENT DOCD: CPT | Mod: CPTII,S$GLB,, | Performed by: INTERNAL MEDICINE

## 2024-06-24 PROCEDURE — 99999 PR PBB SHADOW E&M-EST. PATIENT-LVL III: CPT | Mod: PBBFAC,,, | Performed by: INTERNAL MEDICINE

## 2024-06-24 PROCEDURE — 96416 CHEMO PROLONG INFUSE W/PUMP: CPT

## 2024-06-24 PROCEDURE — 3074F SYST BP LT 130 MM HG: CPT | Mod: CPTII,S$GLB,, | Performed by: INTERNAL MEDICINE

## 2024-06-24 PROCEDURE — 3008F BODY MASS INDEX DOCD: CPT | Mod: CPTII,S$GLB,, | Performed by: INTERNAL MEDICINE

## 2024-06-24 PROCEDURE — 99215 OFFICE O/P EST HI 40 MIN: CPT | Mod: S$GLB,,, | Performed by: INTERNAL MEDICINE

## 2024-06-24 PROCEDURE — 96367 TX/PROPH/DG ADDL SEQ IV INF: CPT

## 2024-06-24 PROCEDURE — G2211 COMPLEX E/M VISIT ADD ON: HCPCS | Mod: S$GLB,,, | Performed by: INTERNAL MEDICINE

## 2024-06-24 PROCEDURE — 1126F AMNT PAIN NOTED NONE PRSNT: CPT | Mod: CPTII,S$GLB,, | Performed by: INTERNAL MEDICINE

## 2024-06-24 PROCEDURE — 3078F DIAST BP <80 MM HG: CPT | Mod: CPTII,S$GLB,, | Performed by: INTERNAL MEDICINE

## 2024-06-24 PROCEDURE — 96413 CHEMO IV INFUSION 1 HR: CPT

## 2024-06-24 PROCEDURE — 63600175 PHARM REV CODE 636 W HCPCS: Performed by: REGISTERED NURSE

## 2024-06-24 PROCEDURE — 99285 EMERGENCY DEPT VISIT HI MDM: CPT | Mod: 25

## 2024-06-24 PROCEDURE — 96375 TX/PRO/DX INJ NEW DRUG ADDON: CPT

## 2024-06-24 PROCEDURE — 25000003 PHARM REV CODE 250: Performed by: REGISTERED NURSE

## 2024-06-24 PROCEDURE — 1101F PT FALLS ASSESS-DOCD LE1/YR: CPT | Mod: CPTII,S$GLB,, | Performed by: INTERNAL MEDICINE

## 2024-06-24 PROCEDURE — 96417 CHEMO IV INFUS EACH ADDL SEQ: CPT

## 2024-06-24 PROCEDURE — 1160F RVW MEDS BY RX/DR IN RCRD: CPT | Mod: CPTII,S$GLB,, | Performed by: INTERNAL MEDICINE

## 2024-06-24 RX ORDER — EPINEPHRINE 0.3 MG/.3ML
0.3 INJECTION SUBCUTANEOUS ONCE AS NEEDED
Status: CANCELLED | OUTPATIENT
Start: 2024-06-26

## 2024-06-24 RX ORDER — DIPHENHYDRAMINE HYDROCHLORIDE 50 MG/ML
50 INJECTION INTRAMUSCULAR; INTRAVENOUS ONCE AS NEEDED
Status: CANCELLED | OUTPATIENT
Start: 2024-06-26

## 2024-06-24 RX ORDER — EPINEPHRINE 0.3 MG/.3ML
0.3 INJECTION SUBCUTANEOUS ONCE AS NEEDED
Status: DISCONTINUED | OUTPATIENT
Start: 2024-06-24 | End: 2024-06-24 | Stop reason: HOSPADM

## 2024-06-24 RX ORDER — ATROPINE SULFATE 0.4 MG/ML
0.4 INJECTION, SOLUTION ENDOTRACHEAL; INTRAMEDULLARY; INTRAMUSCULAR; INTRAVENOUS; SUBCUTANEOUS ONCE AS NEEDED
Status: CANCELLED | OUTPATIENT
Start: 2024-06-26

## 2024-06-24 RX ORDER — DIPHENHYDRAMINE HYDROCHLORIDE 50 MG/ML
50 INJECTION INTRAMUSCULAR; INTRAVENOUS ONCE AS NEEDED
Status: DISCONTINUED | OUTPATIENT
Start: 2024-06-24 | End: 2024-06-24 | Stop reason: HOSPADM

## 2024-06-24 RX ORDER — SODIUM CHLORIDE 0.9 % (FLUSH) 0.9 %
10 SYRINGE (ML) INJECTION
Status: DISCONTINUED | OUTPATIENT
Start: 2024-06-24 | End: 2024-06-24 | Stop reason: HOSPADM

## 2024-06-24 RX ORDER — SODIUM CHLORIDE 0.9 % (FLUSH) 0.9 %
10 SYRINGE (ML) INJECTION
OUTPATIENT
Start: 2024-06-28

## 2024-06-24 RX ORDER — PROCHLORPERAZINE EDISYLATE 5 MG/ML
5 INJECTION INTRAMUSCULAR; INTRAVENOUS ONCE AS NEEDED
Status: CANCELLED
Start: 2024-06-26

## 2024-06-24 RX ORDER — PROCHLORPERAZINE EDISYLATE 5 MG/ML
5 INJECTION INTRAMUSCULAR; INTRAVENOUS ONCE AS NEEDED
Status: DISCONTINUED | OUTPATIENT
Start: 2024-06-24 | End: 2024-06-24 | Stop reason: HOSPADM

## 2024-06-24 RX ORDER — ATROPINE SULFATE 0.4 MG/ML
0.4 INJECTION, SOLUTION ENDOTRACHEAL; INTRAMEDULLARY; INTRAMUSCULAR; INTRAVENOUS; SUBCUTANEOUS ONCE AS NEEDED
Status: COMPLETED | OUTPATIENT
Start: 2024-06-24 | End: 2024-06-24

## 2024-06-24 RX ORDER — SODIUM CHLORIDE 0.9 % (FLUSH) 0.9 %
10 SYRINGE (ML) INJECTION
Status: CANCELLED | OUTPATIENT
Start: 2024-06-26

## 2024-06-24 RX ORDER — HEPARIN 100 UNIT/ML
500 SYRINGE INTRAVENOUS
Status: CANCELLED | OUTPATIENT
Start: 2024-06-26

## 2024-06-24 RX ORDER — HEPARIN 100 UNIT/ML
500 SYRINGE INTRAVENOUS
OUTPATIENT
Start: 2024-06-28

## 2024-06-24 RX ORDER — HEPARIN 100 UNIT/ML
500 SYRINGE INTRAVENOUS
Status: DISCONTINUED | OUTPATIENT
Start: 2024-06-24 | End: 2024-06-24 | Stop reason: HOSPADM

## 2024-06-24 RX ADMIN — SODIUM CHLORIDE: 9 INJECTION, SOLUTION INTRAVENOUS at 10:06

## 2024-06-24 RX ADMIN — BEVACIZUMAB-AWWB 300 MG: 100 INJECTION, SOLUTION INTRAVENOUS at 10:06

## 2024-06-24 RX ADMIN — DEXAMETHASONE SODIUM PHOSPHATE 0.25 MG: 4 INJECTION, SOLUTION INTRA-ARTICULAR; INTRALESIONAL; INTRAMUSCULAR; INTRAVENOUS; SOFT TISSUE at 11:06

## 2024-06-24 RX ADMIN — SODIUM CHLORIDE 240 MG: 9 INJECTION, SOLUTION INTRAVENOUS at 11:06

## 2024-06-24 RX ADMIN — FLUOROURACIL 3695 MG: 50 INJECTION, SOLUTION INTRAVENOUS at 01:06

## 2024-06-24 RX ADMIN — ATROPINE SULFATE 0.4 MG: 0.4 INJECTION, SOLUTION INTRAVENOUS at 11:06

## 2024-06-24 NOTE — PLAN OF CARE
1330-pt tolerated FOLFIRI and MVASI infusion well, no complications or side effects, POC and meds discussed with pt, 5 FU CADD connected to pt, site secured, infusing w/out issue; pt instructed to remain well hydration;pt to report for pump d/c on wed. 6/26/24 @ 1130am, pt aware of upcoming appts, pt knows to call MD with any questions or concerns. Pt ambulated off unit, no distress noted.

## 2024-06-24 NOTE — PROGRESS NOTES
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 Bonilla  12/30/2022: Y-90 to R liver  1/27/2023: Y-90 to R liver  5/17/2023: Y-90 to R liver  7/11/2023: C1 FOLFIRI + Pema  7/26/2023: C2 FOLFIRI + Pema  8/8/2023: C3 FOLFIRI + Pema  8/22/23: C4 FOLFIRI + Pema  Restaging CT CAP 9/1/23: Good response to chemo.  9/7/23: C5 FOLFIRI + Pema  ......................................  Restaging CT CAP 10/12/23: Good response to chemo  10/16/23: C8 FOLFIRI + Pmea  .......................................  Restaging CT CAP 12/8/23: Good response to chemo  12/11/23: C12 FOLFIRI + Pema  .......................................  Restaging CT CAP 2/5/24: Good response to chemo  2/8/24: C16 FOLFIRI + Pema    Restaging CT CAP 4/26/24: Good response to chemo  Restaging CT CAP 6/6/24: Good response to chemo      Interval History:   He presents today with his daughter prior to cycle 26 of FOLFIRI plus avastin. He continues to feel very well. Energy and appetite are good. Staying hydrated. No significant nausea. Denies diarrhea and constipation. No signs of bleeding or new/worsening pains. No other concerns.     ECOG status is 1. Presents with his daughter today.    Past Medical History:   Past Medical History:   Diagnosis Date    MARIA A (acute kidney injury) 8/18/2022    Hypertension      Metastatic colon cancer to liver 2021     Past Surgical HIstory:   Past Surgical History:   Procedure Laterality Date    COLONOSCOPY N/A 2021    Procedure: COLONOSCOPY with possible stent;  Surgeon: EDILMA Bonilla MD;  Location: [facility] END[facility] (2ND FLR);  Service: Colon and Rectal;  Laterality: N/A;    COLONOSCOPY N/A 11/3/2023    Procedure: COLONOSCOPY;  Surgeon: EDILMA Bonilla MD;  Location: [facility] END[facility] (4TH FLR);  Service: Endoscopy;  Laterality: N/A;  prep instructions sent to pt via portal  From: EDILMA Bonilla MD  Procedure: Colonoscopy  Diagnosis: Surveillance colonoscopy - Hx of colon cancer  Procedure Timin-12 weeks  Provider: Myself  Location: [address]  Additional Scheduling Information: No scheduling con    DIAGNOSTIC LAPAROSCOPY N/A 2022    Procedure: LAPAROSCOPY, DIAGNOSTIC;  Surgeon: Adrian Rodriguez MD;  [facility];  Service: General;  Laterality: N/A;    INSERTION OF TUNNELED CENTRAL VENOUS CATHETER (CVC) WITH SUBCUTANEOUS PORT N/A 5/3/2021    Procedure: [id number]-CATH;  Surgeon: Francisco Layton MD;  [facility];  Service: Vascular;  Laterality: N/A;    OMENTECTOMY N/A 10/20/2022    Procedure: OMENTECTOMY;  Surgeon: EDILMA Bonilla MD;  [facility];  Service: Colon and Rectal;  Laterality: N/A;    RIGHT HEMICOLECTOMY N/A 10/20/2022    Procedure: HEMICOLECTOMY, RIGHT, extended;  Surgeon: EDILMA Bonilla MD;  [facility];  Service: Colon and Rectal;  Laterality: N/A;  Extended right hemicolectomy CONSENT IN AM     Family History:   Family History   Problem Relation Name Age of Onset    Cancer Brother         Social History:  reports that he has never smoked. He has never used smokeless tobacco. He reports that he does not currently use alcohol. He reports that he does not use drugs.    Allergies:  Review of patient's allergies indicates:  No Known Allergies    Medications:  Current Outpatient Medications    Medication Sig Dispense Refill    acetaminophen (TYLENOL) 500 MG tablet Take 1 tablet (500 mg total) by mouth every 6 (six) hours as needed for Pain (alternate with ibuprofen).  0    amLODIPine (NORVASC) 10 MG tablet Take 1 tablet (10 mg total) by mouth once daily. 90 tablet 3    ibuprofen (ADVIL,MOTRIN) 400 MG tablet Take 1 tablet (400 mg total) by mouth every 6 (six) hours as needed for Other (pain). Alternate with tylenol      LIDOcaine-prilocaine (EMLA) cream Apply topically as needed (port application). 30 g 3    ondansetron (ZOFRAN) 4 MG tablet Take 1 tablet (4 mg total) by mouth every 8 (eight) hours as needed for Nausea. 30 tablet 3    oxyCODONE (ROXICODONE) 5 MG immediate release tablet Take 1 tablet (5 mg total) by mouth every 6 (six) hours as needed for Pain. 20 tablet 0    tamsulosin (FLOMAX) 0.4 mg Cap Take 1 capsule (0.4 mg total) by mouth once daily. 30 capsule 5     No current facility-administered medications for this visit.     Review of Systems   Constitutional:  Negative for activity change, appetite change, chills, diaphoresis, fatigue, fever and unexpected weight change.   HENT:  Negative for congestion, mouth sores, nosebleeds, postnasal drip, rhinorrhea, trouble swallowing and voice change.    Eyes:  Negative for pain and visual disturbance.   Respiratory:  Negative for cough, chest tightness, shortness of breath and wheezing.    Cardiovascular:  Negative for chest pain, palpitations and leg swelling.   Gastrointestinal:  Negative for abdominal distention, abdominal pain, blood in stool, constipation, diarrhea, nausea and vomiting.   Genitourinary:  Negative for difficulty urinating, dysuria, flank pain, frequency, hematuria and urgency.   Musculoskeletal:  Negative for arthralgias, back pain and myalgias.   Skin:  Negative for rash and wound.   Neurological:  Negative for dizziness, facial asymmetry, weakness, light-headedness, numbness and headaches.   Psychiatric/Behavioral:  Negative  "for agitation, behavioral problems, confusion, decreased concentration, dysphoric mood and sleep disturbance. The patient is not nervous/anxious.    All other systems reviewed and are negative.    ECOG Performance Status: 1     Objective:      Vitals:   Vitals:    06/24/24 0856   BP: 113/66   Pulse: 72   Resp: 16   Temp: 98.5 °F (36.9 °C)   TempSrc: Oral   SpO2: 99%   Weight: 61.7 kg (136 lb 0.4 oz)   Height: 5' 7" (1.702 m)     Physical Exam  Vitals reviewed.   Constitutional:       General: He is not in acute distress.     Appearance: Normal appearance. He is not ill-appearing, toxic-appearing or diaphoretic.   HENT:      Head: Normocephalic and atraumatic.      Right Ear: External ear normal.      Left Ear: External ear normal.      Nose: Nose normal.      Mouth/Throat:      Pharynx: Oropharynx is clear.   Eyes:      General: No scleral icterus.     Extraocular Movements: Extraocular movements intact.      Conjunctiva/sclera: Conjunctivae normal.      Pupils: Pupils are equal, round, and reactive to light.   Cardiovascular:      Rate and Rhythm: Normal rate and regular rhythm.      Pulses: Normal pulses.      Heart sounds: Normal heart sounds. No murmur heard.  Pulmonary:      Effort: Pulmonary effort is normal. No respiratory distress.      Breath sounds: Normal breath sounds. No wheezing.   Chest:      Comments: Port to RCW, no signs of infection.  Abdominal:      General: Abdomen is flat. Bowel sounds are normal. There is no distension.      Palpations: Abdomen is soft. There is no mass.      Tenderness: There is no abdominal tenderness.      Comments: Vertical midline abdominal wound well healed   Musculoskeletal:         General: No swelling or deformity. Normal range of motion.      Right lower leg: No edema.      Left lower leg: No edema.   Skin:     Coloration: Skin is not jaundiced or pale.      Findings: No bruising, erythema or rash.   Neurological:      General: No focal deficit present.      Mental " Status: He is alert and oriented to person, place, and time. Mental status is at baseline.      Cranial Nerves: No cranial nerve deficit.      Sensory: No sensory deficit.      Gait: Gait normal.   Psychiatric:         Mood and Affect: Mood normal.         Behavior: Behavior normal.         Thought Content: Thought content normal.         Judgment: Judgment normal.     Laboratory Data:  Lab Visit on 06/24/2024   Component Date Value Ref Range Status    WBC 06/24/2024 4.09  3.90 - 12.70 K/uL Final    RBC 06/24/2024 4.13 (L)  4.60 - 6.20 M/uL Final    Hemoglobin 06/24/2024 10.2 (L)  14.0 - 18.0 g/dL Final    Hematocrit 06/24/2024 35.1 (L)  40.0 - 54.0 % Final    MCV 06/24/2024 85  82 - 98 fL Final    MCH 06/24/2024 24.7 (L)  27.0 - 31.0 pg Final    MCHC 06/24/2024 29.1 (L)  32.0 - 36.0 g/dL Final    RDW 06/24/2024 20.0 (H)  11.5 - 14.5 % Final    Platelets 06/24/2024 176  150 - 450 K/uL Final    MPV 06/24/2024 10.7  9.2 - 12.9 fL Final    Gran # (ANC) 06/24/2024 2.4  1.8 - 7.7 K/uL Final    Comment: The ANC is based on a white cell differential from an   automated cell counter. It has not been microscopically   reviewed for the presence of abnormal cells. Clinical   correlation is required.      Immature Grans (Abs) 06/24/2024 0.03  0.00 - 0.04 K/uL Final    Comment: Mild elevation in immature granulocytes is non specific and   can be seen in a variety of conditions including stress response,   acute inflammation, trauma and pregnancy. Correlation with other   laboratory and clinical findings is essential.       Assessment and Plan        1. Metastatic colon cancer to liver    2. Immunodeficiency secondary to neoplasm    3. Immunodeficiency due to chemotherapy    4. Hypertension, unspecified type    5. MARIA A (acute kidney injury)    6. Anemia associated with chemotherapy    7. Pain    8. Weight decrease    9. Drug-induced constipation    10. Severe malnutrition    11. Lower urinary tract symptoms (LUTS)    12.  Hyperkalemia      1-3.  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  Guardant 360 was negative for BRAF, VIRI, HER2 amplification.   Pretreatment CEA 57.  We had a long and sonia discussion with him about his diagnosis. Unfortunately, the disease is not curable but remains treatable and he has good performance status.   Restaging scans after 7 cycles of FOLFOX + Pema showed significant reduction in colonic mass and liver mass.   we switched to maintenance as of cycle 8 with 5FU + Pema  Restaging scans from March 2022 show stable disease.   MRI on 7/18/22 showing hepatic progression of disease in the right hepatic lobe noted on the exam. CT CAP on 8/26 shows some mild progression in both liver metastases.    Discussed case at colorectal tumor board 8/31/22 and had a diagnostic lap performed by Dr. Rodrigeuz on 9/7 to assess for any occult peritoneal disease. Findings from the diagnostic lap were negative. Fluid from around from around the primary mass was sent for cytology that was negative for malignancy.   Underwent primary tumor resection on 10/20/22 with Dr. Bonilla.    Meanwhile his liver mets had grown.  We discussed his case at CRC conference with plans for short term Y-90 and then restarting chemotherapy prior to considering any surgical options to his liver metastases.  He underwent Y-90 delivery on 12/30/22. Tolerated well.   CT CAP on 1/23/23 reviewed at [facility] conference with good response to Y-90, no new lesions.  Underwent second Y-90 on 1/27/23. Can consider R hepatectomy pending follow-up imaging after Y-90.     Received cycle 42 of FOLFOX (omitted oxaliplatin again starting cycle 41 due to neuropathy) on 2/9/23.  Because of 5-FU shortage nationally, we have been holding chemotherapy since mid-February 2023.  If he needs to restart chemotherapy (I.e. no plans for surgical resection), will place him on capecitabine maintenance for duration of 5-FU shortage.     Discussed at  colorectal liver mets tumor board 3/16/23.  Plan for repeat Y-90 and then consideration of surgical resection and he will meet with liver transplant team as well.  Underwent Y-90 delivery 5/17/23 with IR.     Has been on maintenance Xeloda since late April 2023.    Met with liver transplant team but ultimately opted not to proceed with transplant as he was concerned about how he would tolerate a major surgery with the life changes that would come after transplant as well. They closed out his case.    He met with Dr. Rodriguez to discuss whether surgical resection is indicated for his liver mets.  He recommended repeat MRI.  Repeat MRI unfortunately showed disease progression while on Xeloda maintenance.  Similarly his CEA garrett precipitously, all in keeping with worsening disease.    We recommended we restart IV chemotherapy with FOLFIRI + Avastin (never had irinotecan).    Because of hyperbilirubinemia he received cycle 1 with just 5-FU + Avastin on 7/11/23. Tolerated this well. Bilirubin has improved.  Received irinotecan starting with cycle 2.  Repeat CT CAP after cycle 4 shows good response to therapy.   Excellent tolerance. [facility] tumor board agrees with continuation of chemotherapy.   Repeat CT CAP after cycle 7 shows stable disease, no evidence of new or worsening disease.   Repeat CT CAP after cycle 11 shows stable disease.   Repeat CT CAP after cycle 15 shows improved disease.  Repeat CT CAP after cycle 21 shows stable disease.   Repeat CT CAP after cycle 24 shows stable disease.    Doing well today. Tolerating treatment well.   Labs reviewed and adequate for treatment.   Will proceed with cycle 26 today.   Repeat imaging after cycle 28.     -RTC in 2 weeks for next cycle with lab work/UA     Tempus: APC, CKS1B cng, ERCC3 cnl, PIK3CA; KENIA, TMB 12.1.    4. Hypertension   -- BP well controlled today. Feels well.   -- Following with PCP.   -- encouraged him and his daughter to monitor BP and HR closely at home.      5. MARIA A  -- Resolved   -- Monitor. Encouraged continued hydration particularly with working outside.     6. Anemia  -- Hgb stable. Monitor.  -- No signs of bleeding. Platelets normal.     7-10. Neoplasm related pain, weight loss, constipation, malnutrition  -- Pain very well controlled. Has oxycodone 5mg to use PRN but not requiring now.  -- Following with nutrition. Encouraged increased protein. Monitor.  -- Continue Miralax daily for constipation. Doing well with this.     11. Urinary Hesitancy.  -- Continue Flomax.   -- Reported improvement since starting medication.     12. Hyperkalemia  - K+ mildly elevated at 5.2 today. Monitor.    Patient is in agreement with the proposed treatment plan. All questions were answered to the patient's satisfaction. Pt knows to call clinic if anything is needed before the next clinic visit.    Patient discussed with collaborating physician, Dr. Schuster.    At least 40 minutes were spent today on this encounter including face to face time with the patient, data gathering/interpretation and documentation.       Natividad Maher, MSN, APRN, ACCNS-  Hematology and Medical Oncology  Clinical Nurse Specialist to Dr. Schuster, Dr. Quijano & Dr. Mueller    Route Chart for Scheduling    Med Onc Chart Routing      Follow up with physician . Please change 8/5 visit from RACHEL to MD and schedule scans 1-2 days prior. otherwise, keep as scheduled.   Follow up with RACHEL    Infusion scheduling note    Injection scheduling note    Labs    Imaging CT chest abdomen pelvis   prior to visit on 8/5, then see Dr. Schuster to review results.   Pharmacy appointment    Other referrals

## 2024-06-25 PROBLEM — K92.2 GI BLEED: Status: ACTIVE | Noted: 2024-06-25

## 2024-06-25 PROBLEM — E87.5 HYPERKALEMIA: Status: ACTIVE | Noted: 2024-06-25

## 2024-06-25 LAB
ABO + RH BLD: NORMAL
ALBUMIN SERPL BCP-MCNC: 2.8 G/DL (ref 3.5–5.2)
ALBUMIN SERPL BCP-MCNC: 2.8 G/DL (ref 3.5–5.2)
ALP SERPL-CCNC: 121 U/L (ref 55–135)
ALP SERPL-CCNC: 125 U/L (ref 55–135)
ALT SERPL W/O P-5'-P-CCNC: 32 U/L (ref 10–44)
ALT SERPL W/O P-5'-P-CCNC: 35 U/L (ref 10–44)
ANION GAP SERPL CALC-SCNC: 8 MMOL/L (ref 8–16)
ANION GAP SERPL CALC-SCNC: 8 MMOL/L (ref 8–16)
APTT PPP: 24.1 SEC (ref 21–32)
AST SERPL-CCNC: 46 U/L (ref 10–40)
AST SERPL-CCNC: 63 U/L (ref 10–40)
BASOPHILS # BLD AUTO: 0 K/UL (ref 0–0.2)
BASOPHILS NFR BLD: 0 % (ref 0–1.9)
BILIRUB SERPL-MCNC: 1.2 MG/DL (ref 0.1–1)
BILIRUB SERPL-MCNC: 1.3 MG/DL (ref 0.1–1)
BILIRUB UR QL STRIP: NEGATIVE
BLD GP AB SCN CELLS X3 SERPL QL: NORMAL
BUN SERPL-MCNC: 21 MG/DL (ref 8–23)
BUN SERPL-MCNC: 22 MG/DL (ref 8–23)
BUN SERPL-MCNC: 30 MG/DL (ref 6–30)
BUN SERPL-MCNC: 31 MG/DL (ref 6–30)
CALCIUM SERPL-MCNC: 8.3 MG/DL (ref 8.7–10.5)
CALCIUM SERPL-MCNC: 8.7 MG/DL (ref 8.7–10.5)
CHLORIDE SERPL-SCNC: 110 MMOL/L (ref 95–110)
CHLORIDE SERPL-SCNC: 111 MMOL/L (ref 95–110)
CHLORIDE SERPL-SCNC: 111 MMOL/L (ref 95–110)
CHLORIDE SERPL-SCNC: 113 MMOL/L (ref 95–110)
CLARITY UR REFRACT.AUTO: CLEAR
CO2 SERPL-SCNC: 19 MMOL/L (ref 23–29)
CO2 SERPL-SCNC: 21 MMOL/L (ref 23–29)
COLOR UR AUTO: YELLOW
CREAT SERPL-MCNC: 0.9 MG/DL (ref 0.5–1.4)
CREAT SERPL-MCNC: 1.1 MG/DL (ref 0.5–1.4)
CREAT SERPL-MCNC: 1.2 MG/DL (ref 0.5–1.4)
CREAT SERPL-MCNC: 1.2 MG/DL (ref 0.5–1.4)
DIFFERENTIAL METHOD BLD: ABNORMAL
EOSINOPHIL # BLD AUTO: 0 K/UL (ref 0–0.5)
EOSINOPHIL NFR BLD: 0 % (ref 0–8)
ERYTHROCYTE [DISTWIDTH] IN BLOOD BY AUTOMATED COUNT: 19.3 % (ref 11.5–14.5)
ERYTHROCYTE [DISTWIDTH] IN BLOOD BY AUTOMATED COUNT: 19.6 % (ref 11.5–14.5)
ERYTHROCYTE [DISTWIDTH] IN BLOOD BY AUTOMATED COUNT: 19.6 % (ref 11.5–14.5)
ERYTHROCYTE [DISTWIDTH] IN BLOOD BY AUTOMATED COUNT: 21 % (ref 11.5–14.5)
EST. GFR  (NO RACE VARIABLE): >60 ML/MIN/1.73 M^2
EST. GFR  (NO RACE VARIABLE): >60 ML/MIN/1.73 M^2
GLUCOSE SERPL-MCNC: 138 MG/DL (ref 70–110)
GLUCOSE SERPL-MCNC: 182 MG/DL (ref 70–110)
GLUCOSE SERPL-MCNC: 182 MG/DL (ref 70–110)
GLUCOSE SERPL-MCNC: 184 MG/DL (ref 70–110)
GLUCOSE UR QL STRIP: NEGATIVE
HCT VFR BLD AUTO: 26.7 % (ref 40–54)
HCT VFR BLD AUTO: 27.1 % (ref 40–54)
HCT VFR BLD AUTO: 27.4 % (ref 40–54)
HCT VFR BLD AUTO: 28.6 % (ref 40–54)
HCT VFR BLD CALC: 25 %PCV (ref 36–54)
HCT VFR BLD CALC: 25 %PCV (ref 36–54)
HCV AB SERPL QL IA: NORMAL
HGB BLD-MCNC: 7.8 G/DL (ref 14–18)
HGB BLD-MCNC: 7.8 G/DL (ref 14–18)
HGB BLD-MCNC: 7.9 G/DL (ref 14–18)
HGB BLD-MCNC: 8.1 G/DL (ref 14–18)
HGB UR QL STRIP: NEGATIVE
HIV 1+2 AB+HIV1 P24 AG SERPL QL IA: NORMAL
IMM GRANULOCYTES # BLD AUTO: 0.01 K/UL (ref 0–0.04)
IMM GRANULOCYTES # BLD AUTO: 0.02 K/UL (ref 0–0.04)
IMM GRANULOCYTES NFR BLD AUTO: 0.2 % (ref 0–0.5)
IMM GRANULOCYTES NFR BLD AUTO: 0.3 % (ref 0–0.5)
IMM GRANULOCYTES NFR BLD AUTO: 0.4 % (ref 0–0.5)
IMM GRANULOCYTES NFR BLD AUTO: 0.5 % (ref 0–0.5)
INR PPP: 1 (ref 0.8–1.2)
IRON SERPL-MCNC: 144 UG/DL (ref 45–160)
KETONES UR QL STRIP: NEGATIVE
LEUKOCYTE ESTERASE UR QL STRIP: NEGATIVE
LIPASE SERPL-CCNC: 62 U/L (ref 4–60)
LYMPHOCYTES # BLD AUTO: 0.2 K/UL (ref 1–4.8)
LYMPHOCYTES # BLD AUTO: 0.3 K/UL (ref 1–4.8)
LYMPHOCYTES # BLD AUTO: 0.3 K/UL (ref 1–4.8)
LYMPHOCYTES # BLD AUTO: 0.4 K/UL (ref 1–4.8)
LYMPHOCYTES NFR BLD: 5.9 % (ref 18–48)
LYMPHOCYTES NFR BLD: 6 % (ref 18–48)
LYMPHOCYTES NFR BLD: 9.1 % (ref 18–48)
LYMPHOCYTES NFR BLD: 9.4 % (ref 18–48)
MAGNESIUM SERPL-MCNC: 2.3 MG/DL (ref 1.6–2.6)
MCH RBC QN AUTO: 24.2 PG (ref 27–31)
MCH RBC QN AUTO: 24.2 PG (ref 27–31)
MCH RBC QN AUTO: 24.4 PG (ref 27–31)
MCH RBC QN AUTO: 24.7 PG (ref 27–31)
MCHC RBC AUTO-ENTMCNC: 28.3 G/DL (ref 32–36)
MCHC RBC AUTO-ENTMCNC: 28.5 G/DL (ref 32–36)
MCHC RBC AUTO-ENTMCNC: 28.8 G/DL (ref 32–36)
MCHC RBC AUTO-ENTMCNC: 29.6 G/DL (ref 32–36)
MCV RBC AUTO: 83 FL (ref 82–98)
MCV RBC AUTO: 84 FL (ref 82–98)
MCV RBC AUTO: 85 FL (ref 82–98)
MCV RBC AUTO: 86 FL (ref 82–98)
MONOCYTES # BLD AUTO: 0.1 K/UL (ref 0.3–1)
MONOCYTES # BLD AUTO: 0.1 K/UL (ref 0.3–1)
MONOCYTES # BLD AUTO: 0.2 K/UL (ref 0.3–1)
MONOCYTES # BLD AUTO: 0.6 K/UL (ref 0.3–1)
MONOCYTES NFR BLD: 11.1 % (ref 4–15)
MONOCYTES NFR BLD: 2.1 % (ref 4–15)
MONOCYTES NFR BLD: 3 % (ref 4–15)
MONOCYTES NFR BLD: 5.5 % (ref 4–15)
NEUTROPHILS # BLD AUTO: 2.6 K/UL (ref 1.8–7.7)
NEUTROPHILS # BLD AUTO: 2.6 K/UL (ref 1.8–7.7)
NEUTROPHILS # BLD AUTO: 3.2 K/UL (ref 1.8–7.7)
NEUTROPHILS # BLD AUTO: 4.6 K/UL (ref 1.8–7.7)
NEUTROPHILS NFR BLD: 82.8 % (ref 38–73)
NEUTROPHILS NFR BLD: 84.6 % (ref 38–73)
NEUTROPHILS NFR BLD: 87.6 % (ref 38–73)
NEUTROPHILS NFR BLD: 91.5 % (ref 38–73)
NITRITE UR QL STRIP: NEGATIVE
NRBC BLD-RTO: 0 /100 WBC
OHS QRS DURATION: 96 MS
OHS QTC CALCULATION: 436 MS
PH UR STRIP: 7 [PH] (ref 5–8)
PHOSPHATE SERPL-MCNC: 2.2 MG/DL (ref 2.7–4.5)
PLATELET # BLD AUTO: 132 K/UL (ref 150–450)
PLATELET # BLD AUTO: 157 K/UL (ref 150–450)
PLATELET # BLD AUTO: 166 K/UL (ref 150–450)
PLATELET # BLD AUTO: 171 K/UL (ref 150–450)
PMV BLD AUTO: 10.3 FL (ref 9.2–12.9)
PMV BLD AUTO: 11.1 FL (ref 9.2–12.9)
PMV BLD AUTO: 11.3 FL (ref 9.2–12.9)
PMV BLD AUTO: 11.6 FL (ref 9.2–12.9)
POC IONIZED CALCIUM: 0.96 MMOL/L (ref 1.06–1.42)
POC IONIZED CALCIUM: 0.98 MMOL/L (ref 1.06–1.42)
POC TCO2 (MEASURED): 22 MMOL/L (ref 23–29)
POC TCO2 (MEASURED): 22 MMOL/L (ref 23–29)
POCT GLUCOSE: 137 MG/DL (ref 70–110)
POTASSIUM BLD-SCNC: >9 MMOL/L (ref 3.5–5.1)
POTASSIUM BLD-SCNC: >9 MMOL/L (ref 3.5–5.1)
POTASSIUM SERPL-SCNC: 5 MMOL/L (ref 3.5–5.1)
POTASSIUM SERPL-SCNC: 5.8 MMOL/L (ref 3.5–5.1)
PROT SERPL-MCNC: 5.9 G/DL (ref 6–8.4)
PROT SERPL-MCNC: 6 G/DL (ref 6–8.4)
PROT UR QL STRIP: NEGATIVE
PROTHROMBIN TIME: 11.4 SEC (ref 9–12.5)
RBC # BLD AUTO: 3.2 M/UL (ref 4.6–6.2)
RBC # BLD AUTO: 3.2 M/UL (ref 4.6–6.2)
RBC # BLD AUTO: 3.22 M/UL (ref 4.6–6.2)
RBC # BLD AUTO: 3.35 M/UL (ref 4.6–6.2)
SAMPLE: ABNORMAL
SAMPLE: ABNORMAL
SARS-COV-2 RDRP RESP QL NAA+PROBE: NEGATIVE
SATURATED IRON: 38 % (ref 20–50)
SODIUM BLD-SCNC: 136 MMOL/L (ref 136–145)
SODIUM BLD-SCNC: 136 MMOL/L (ref 136–145)
SODIUM SERPL-SCNC: 138 MMOL/L (ref 136–145)
SODIUM SERPL-SCNC: 142 MMOL/L (ref 136–145)
SP GR UR STRIP: >1.03 (ref 1–1.03)
SPECIMEN OUTDATE: NORMAL
TOTAL IRON BINDING CAPACITY: 383 UG/DL (ref 250–450)
TRANSFERRIN SERPL-MCNC: 259 MG/DL (ref 200–375)
URN SPEC COLLECT METH UR: ABNORMAL
WBC # BLD AUTO: 2.81 K/UL (ref 3.9–12.7)
WBC # BLD AUTO: 2.98 K/UL (ref 3.9–12.7)
WBC # BLD AUTO: 3.81 K/UL (ref 3.9–12.7)
WBC # BLD AUTO: 5.6 K/UL (ref 3.9–12.7)

## 2024-06-25 PROCEDURE — 85025 COMPLETE CBC W/AUTO DIFF WBC: CPT | Mod: 91

## 2024-06-25 PROCEDURE — C9113 INJ PANTOPRAZOLE SODIUM, VIA: HCPCS

## 2024-06-25 PROCEDURE — 84100 ASSAY OF PHOSPHORUS: CPT

## 2024-06-25 PROCEDURE — 86901 BLOOD TYPING SEROLOGIC RH(D): CPT | Performed by: EMERGENCY MEDICINE

## 2024-06-25 PROCEDURE — U0002 COVID-19 LAB TEST NON-CDC: HCPCS | Performed by: EMERGENCY MEDICINE

## 2024-06-25 PROCEDURE — 87389 HIV-1 AG W/HIV-1&-2 AB AG IA: CPT | Performed by: PHYSICIAN ASSISTANT

## 2024-06-25 PROCEDURE — 63600175 PHARM REV CODE 636 W HCPCS

## 2024-06-25 PROCEDURE — 81003 URINALYSIS AUTO W/O SCOPE: CPT | Performed by: EMERGENCY MEDICINE

## 2024-06-25 PROCEDURE — 99223 1ST HOSP IP/OBS HIGH 75: CPT | Mod: GC,,, | Performed by: STUDENT IN AN ORGANIZED HEALTH CARE EDUCATION/TRAINING PROGRAM

## 2024-06-25 PROCEDURE — 85610 PROTHROMBIN TIME: CPT

## 2024-06-25 PROCEDURE — 25500020 PHARM REV CODE 255: Performed by: EMERGENCY MEDICINE

## 2024-06-25 PROCEDURE — 80053 COMPREHEN METABOLIC PANEL: CPT | Mod: 91

## 2024-06-25 PROCEDURE — 83735 ASSAY OF MAGNESIUM: CPT

## 2024-06-25 PROCEDURE — 93010 ELECTROCARDIOGRAM REPORT: CPT | Mod: ,,, | Performed by: INTERNAL MEDICINE

## 2024-06-25 PROCEDURE — 96376 TX/PRO/DX INJ SAME DRUG ADON: CPT

## 2024-06-25 PROCEDURE — 96374 THER/PROPH/DIAG INJ IV PUSH: CPT

## 2024-06-25 PROCEDURE — 86900 BLOOD TYPING SEROLOGIC ABO: CPT | Performed by: EMERGENCY MEDICINE

## 2024-06-25 PROCEDURE — 80047 BASIC METABLC PNL IONIZED CA: CPT

## 2024-06-25 PROCEDURE — 36415 COLL VENOUS BLD VENIPUNCTURE: CPT | Mod: XB

## 2024-06-25 PROCEDURE — 86803 HEPATITIS C AB TEST: CPT | Performed by: PHYSICIAN ASSISTANT

## 2024-06-25 PROCEDURE — 80053 COMPREHEN METABOLIC PANEL: CPT | Performed by: EMERGENCY MEDICINE

## 2024-06-25 PROCEDURE — 85025 COMPLETE CBC W/AUTO DIFF WBC: CPT | Performed by: EMERGENCY MEDICINE

## 2024-06-25 PROCEDURE — 25000003 PHARM REV CODE 250: Performed by: STUDENT IN AN ORGANIZED HEALTH CARE EDUCATION/TRAINING PROGRAM

## 2024-06-25 PROCEDURE — 83690 ASSAY OF LIPASE: CPT | Performed by: EMERGENCY MEDICINE

## 2024-06-25 PROCEDURE — 93005 ELECTROCARDIOGRAM TRACING: CPT

## 2024-06-25 PROCEDURE — 20600001 HC STEP DOWN PRIVATE ROOM

## 2024-06-25 PROCEDURE — 83540 ASSAY OF IRON: CPT

## 2024-06-25 PROCEDURE — 85730 THROMBOPLASTIN TIME PARTIAL: CPT

## 2024-06-25 RX ORDER — IBUPROFEN 200 MG
24 TABLET ORAL
Status: DISCONTINUED | OUTPATIENT
Start: 2024-06-25 | End: 2024-06-26 | Stop reason: HOSPADM

## 2024-06-25 RX ORDER — GLUCAGON 1 MG
1 KIT INJECTION
Status: DISCONTINUED | OUTPATIENT
Start: 2024-06-25 | End: 2024-06-26 | Stop reason: HOSPADM

## 2024-06-25 RX ORDER — SODIUM CHLORIDE 0.9 % (FLUSH) 0.9 %
10 SYRINGE (ML) INJECTION EVERY 12 HOURS PRN
Status: DISCONTINUED | OUTPATIENT
Start: 2024-06-25 | End: 2024-06-26 | Stop reason: HOSPADM

## 2024-06-25 RX ORDER — PANTOPRAZOLE SODIUM 40 MG/10ML
80 INJECTION, POWDER, LYOPHILIZED, FOR SOLUTION INTRAVENOUS ONCE
Status: COMPLETED | OUTPATIENT
Start: 2024-06-25 | End: 2024-06-25

## 2024-06-25 RX ORDER — PANTOPRAZOLE SODIUM 40 MG/10ML
40 INJECTION, POWDER, LYOPHILIZED, FOR SOLUTION INTRAVENOUS 2 TIMES DAILY
Status: DISCONTINUED | OUTPATIENT
Start: 2024-06-25 | End: 2024-06-26

## 2024-06-25 RX ORDER — IBUPROFEN 200 MG
16 TABLET ORAL
Status: DISCONTINUED | OUTPATIENT
Start: 2024-06-25 | End: 2024-06-26 | Stop reason: HOSPADM

## 2024-06-25 RX ORDER — INSULIN ASPART 100 [IU]/ML
0-5 INJECTION, SOLUTION INTRAVENOUS; SUBCUTANEOUS EVERY 6 HOURS PRN
Status: DISCONTINUED | OUTPATIENT
Start: 2024-06-25 | End: 2024-06-26 | Stop reason: HOSPADM

## 2024-06-25 RX ORDER — NALOXONE HCL 0.4 MG/ML
0.02 VIAL (ML) INJECTION
Status: DISCONTINUED | OUTPATIENT
Start: 2024-06-25 | End: 2024-06-26 | Stop reason: HOSPADM

## 2024-06-25 RX ADMIN — SODIUM ZIRCONIUM CYCLOSILICATE 10 G: 5 POWDER, FOR SUSPENSION ORAL at 10:06

## 2024-06-25 RX ADMIN — IOHEXOL 75 ML: 350 INJECTION, SOLUTION INTRAVENOUS at 01:06

## 2024-06-25 RX ADMIN — PANTOPRAZOLE SODIUM 40 MG: 40 INJECTION, POWDER, FOR SOLUTION INTRAVENOUS at 10:06

## 2024-06-25 RX ADMIN — PANTOPRAZOLE SODIUM 80 MG: 40 INJECTION, POWDER, FOR SOLUTION INTRAVENOUS at 05:06

## 2024-06-25 RX ADMIN — PANTOPRAZOLE SODIUM 40 MG: 40 INJECTION, POWDER, FOR SOLUTION INTRAVENOUS at 09:06

## 2024-06-25 NOTE — CONSULTS
[facility]  Gastroenterology  Consult Note    Patient Name: Javier Downs  MRN: [id number]  Admission Date: 2024  Hospital Length of Stay: 0 days  Code Status: Full Code   Attending Provider: Charley Gambino MD   Consulting Provider: Luis Felipe Hilton DO  Primary Care Physician: Rosaura Ayoub  Principal Problem:GI bleed    Inpatient consult to Gastroenterology  Consult performed by: Luis Felipe Hilton DO  Consult ordered by: Lamont Courtney DO        Subjective:     HPI: Javier Downs is a 74 y.o. male with colon cancer s/p extended right hemicolectomy (10/2022, Dr. Bonilla) with metastasis to the liver on chemotherapy and HTN that presents to [facility] with 2 episodes of BRBPR on the am of  (3 AM). GI consulted for assistance with management. At the time of consult, the patient is HDS. Baseline Hgb around 10. Hgb drop to 7.8 on this admission. Patient was a total of 4 bloody bowel movements since onset; 2 additional episodes since arrival to [facility]. He is passing only blood without stool. He denies any anticoagulation use, ASA/plavix or NSAID's. He denies any melena, hematemesis, CGE or abdominal pain. He denies any nausea or vomiting. Reports no prior history of GI bleeding.     Imagin24- CTA- No evidence of luminal extravasation or pooling of contrast to suggest upper or lower GI bleed. Colonic diverticulosis without evidence of acute diverticulitis.Chronic portal vein thrombus.Postsurgical change of right hemicolectomy for history of colon cancer with hepatic metastasis status post multiple radio embolizations.      Prior Endoscopies:   11/3/23- Colonoscopy- - Diverticulosis in the sigmoid colon and in the descending colon. Patent end-to-side ileo-colonic anastomosis, characterized by healthy appearing mucosa and an intact appearance. No specimens collected. The examined portion of the ileum was normal.     Past Medical History:   Diagnosis Date    MARIA A (acute kidney injury)  2022    Hypertension     Metastatic colon cancer to liver 2021       Past Surgical History:   Procedure Laterality Date    COLONOSCOPY N/A 2021    Procedure: COLONOSCOPY with possible stent;  Surgeon: EDILMA Bonilla MD;  Location: [facility] ENDO (2ND FLR);  Service: Colon and Rectal;  Laterality: N/A;    COLONOSCOPY N/A 11/3/2023    Procedure: COLONOSCOPY;  Surgeon: EDILMA Bonilla MD;  Location: [facility] ENDO (4TH FLR);  Service: Endoscopy;  Laterality: N/A;  prep instructions sent to pt via portal  From: EDILMA Bonilla MD  Procedure: Colonoscopy  Diagnosis: Surveillance colonoscopy - Hx of colon cancer  Procedure Timin-12 weeks  Provider: Myself  Location: [address]  Additional Scheduling Information: No scheduling con    DIAGNOSTIC LAPAROSCOPY N/A 2022    Procedure: LAPAROSCOPY, DIAGNOSTIC;  Surgeon: Adrian Rodriguez MD;  Location: [facility] OR 2ND FLR;  Service: General;  Laterality: N/A;    INSERTION OF TUNNELED CENTRAL VENOUS CATHETER (CVC) WITH SUBCUTANEOUS PORT N/A 5/3/2021    Procedure: [id number];  Surgeon: Francisco Layton MD;  Location: [facility] OR 2ND FLR;  Service: Vascular;  Laterality: N/A;    OMENTECTOMY N/A 10/20/2022    Procedure: OMENTECTOMY;  Surgeon: EDILMA Bonilla MD;  Location: [facility] OR 2ND FLR;  Service: Colon and Rectal;  Laterality: N/A;    RIGHT HEMICOLECTOMY N/A 10/20/2022    Procedure: HEMICOLECTOMY, RIGHT, extended;  Surgeon: EDILMA Bonilla MD;  [facility];  Service: Colon and Rectal;  Laterality: N/A;  Extended right hemicolectomy CONSENT IN AM       Family History   Problem Relation Name Age of Onset    Cancer Brother         Social History     Socioeconomic History    Marital status:    Tobacco Use    Smoking status: Never    Smokeless tobacco: Never   Substance and Sexual Activity    Alcohol use: Not Currently    Drug use: Never    Sexual activity: Not Currently     Partners: Female     Social Determinants of Health     Food  Insecurity: No Food Insecurity (5/13/2024)    Hunger Vital Sign     Worried About Running Out of Food in the Last Year: Never true     Ran Out of Food in the Last Year: Never true       Current Facility-Administered Medications on File Prior to Encounter   Medication Dose Route Frequency Provider Last Rate Last Admin    [COMPLETED] 0.9% NaCl 250 mL flush bag   Intravenous 1 time in Clinic/HOD Natividad Maher, CNS   Stopped at 06/24/24 1327    [COMPLETED] atropine injection 0.4 mg  0.4 mg Intravenous Once PRN Natividad Maher CNS   0.4 mg at 06/24/24 1148    [COMPLETED] bevacizumab-awwb (MVASI) 300 mg in 0.9% NaCl 100 mL infusion  300 mg Intravenous 1 time in Clinic/HOD Natividad Maher, CNS   Stopped at 06/24/24 1118    [COMPLETED] irinotecan (CAMPTOSAR) 240 mg in 0.9% NaCl 577 mL chemo infusion  240 mg Intravenous 1 time in Clinic/HOD Natividad Mahre, CNS   Stopped at 06/24/24 1321    [COMPLETED] palonosetron 0.25mg/dexAMETHasone 12mg in NS IVPB 0.25 mg 50 mL  0.25 mg Intravenous 1 time in Clinic/HOD Natividad Maher, CNS   Stopped at 06/24/24 1143    [DISCONTINUED] alteplase injection 2 mg  2 mg Intra-Catheter PRN Natividad Maher, CNS        [DISCONTINUED] diphenhydrAMINE injection 50 mg  50 mg Intravenous Once PRN Natividad Maher, CNS        [DISCONTINUED] EPINEPHrine (EPIPEN) 0.3 mg/0.3 mL pen injection 0.3 mg  0.3 mg Intramuscular Once PRN Natividad Maher, CNS        [DISCONTINUED] fluorouraciL 2,200 mg/m2 = 3,695 mg in 0.9% NaCl 100 mL chemo infusion  2,200 mg/m2 (Treatment Plan Recorded) Intravenous over 46 hr Natividad Maher CNS   3,695 mg at 06/24/24 1327    [DISCONTINUED] heparin, porcine (PF) 100 unit/mL injection flush 500 Units  500 Units Intravenous PRN Natividad Maher, CNS        [DISCONTINUED] hydrocortisone sodium succinate injection 100 mg  100 mg Intravenous Once PRN Natividad Maher, CNS        [DISCONTINUED] prochlorperazine injection Soln 5 mg  5 mg Intravenous Once PRN Natividad Maher  MIGUEL CNS        [DISCONTINUED] sodium chloride 0.9% flush 10 mL  10 mL Intravenous PRN Natividad Maher CNS         Current Outpatient Medications on File Prior to Encounter   Medication Sig Dispense Refill    acetaminophen (TYLENOL) 500 MG tablet Take 1 tablet (500 mg total) by mouth every 6 (six) hours as needed for Pain (alternate with ibuprofen).  0    amLODIPine (NORVASC) 10 MG tablet Take 1 tablet (10 mg total) by mouth once daily. 90 tablet 3    ibuprofen (ADVIL,MOTRIN) 400 MG tablet Take 1 tablet (400 mg total) by mouth every 6 (six) hours as needed for Other (pain). Alternate with tylenol      LIDOcaine-prilocaine (EMLA) cream Apply topically as needed (port application). 30 g 3    ondansetron (ZOFRAN) 4 MG tablet Take 1 tablet (4 mg total) by mouth every 8 (eight) hours as needed for Nausea. 30 tablet 3    oxyCODONE (ROXICODONE) 5 MG immediate release tablet Take 1 tablet (5 mg total) by mouth every 6 (six) hours as needed for Pain. 20 tablet 0    tamsulosin (FLOMAX) 0.4 mg Cap Take 1 capsule (0.4 mg total) by mouth once daily. 30 capsule 5       Review of patient's allergies indicates:  No Known Allergies    Review of Systems   Constitutional:  Negative for chills and fever.   HENT:  Negative for congestion and sore throat.    Respiratory:  Negative for cough and shortness of breath.    Cardiovascular:  Negative for chest pain and palpitations.   Gastrointestinal:  Positive for blood in stool. Negative for abdominal pain, nausea and vomiting.   Genitourinary:  Negative for dysuria and frequency.   Musculoskeletal:  Negative for back pain and myalgias.   Skin:  Negative for itching and rash.   Neurological:  Negative for dizziness and headaches.      Objective:     Vitals:    06/25/24 0719   BP: 112/68   Pulse: 79   Resp: 20   Temp: 98.3 °F (36.8 °C)   Constitutional:  not in acute distress and well developed  HENT: Head: Normal, normocephalic, atraumatic.  Eyes: conjunctiva clear and sclera  nonicteric  Cardiovascular: regular rate and rhythm  Respiratory: normal chest expansion & respiratory effort   and no accessory muscle use  GI: soft, non-tender, without masses or organomegaly  Musculoskeletal: no muscular tenderness noted  Skin: normal color  Neurological: alert, oriented x3  Psychiatric: mood and affect are within normal limits    Significant Labs:  Recent Labs   Lab 24  0838 24  0011 24  0448   HGB 10.2* 8.1* 7.8*       Lab Results   Component Value Date    WBC 2.98 (L) 2024    HGB 7.8 (L) 2024    HCT 27.1 (L) 2024    MCV 84 2024     2024       Lab Results   Component Value Date     2024    K 5.0 2024     (H) 2024    CO2 21 (L) 2024    BUN 21 2024    CREATININE 0.9 2024    CALCIUM 8.7 2024    ANIONGAP 8 2024    ESTGFRAFRICA >60.0 2022    EGFRNONAA >60.0 2022       Lab Results   Component Value Date    ALT 32 2024    AST 46 (H) 2024    ALKPHOS 125 2024    BILITOT 1.2 (H) 2024       Lab Results   Component Value Date    INR 1.0 2024    INR 1.1 2023    INR 1.1 2023       Significant Imaging:  Reviewed pertinent radiology findings.       Assessment/Plan:     Javier Downs is a 74 y.o. male with colon cancer s/p extended right hemicolectomy (10/2022, Dr. Bonilla) with metastasis to the liver on chemotherapy and HTN that presents to [facility] with 2 episodes of BRBPR on the am of  (3 AM). GI consulted for assistance with management. At the time of consult, the patient is HDS. Baseline Hgb around 10. Hgb drop to 7.8 on this admission.    Imagin24- CTA- No evidence of luminal extravasation or pooling of contrast to suggest upper or lower GI bleed. Colonic diverticulosis without evidence of acute diverticulitis.Chronic portal vein thrombus.Postsurgical change of right hemicolectomy for history of colon cancer with hepatic  metastasis status post multiple radio embolizations.      Prior Endoscopies:   11/3/23- Colonoscopy- - Diverticulosis in the sigmoid colon and in the descending colon. Patent end-to-side ileo-colonic anastomosis, characterized by healthy appearing mucosa and an intact appearance. No specimens collected. The examined portion of the ileum was normal.     Problem List:  Hematochezia   History of Colon Cancer s/p extended right hemicolectomy   Metastatic colon cancer with mets to the liver on chemotherapy   Hyperkalemia   Diverticulosis of the Sigmoid and Descending Colon     Recommendations:  - Okay for diet today  - Given relatively recent colonoscopy and pattern of LGIB; suspect this is diverticular bleeding. Would continue supportive care and resuscitation as needed. We will continue to follow to determine time and need for endoscopic evaluation   - Trend Hgb and transfuse for goal Hgb > 7, unless otherwise indicated  - Maintain IV access with 2 large bore IV's  - Intravascular resuscitation/support with IVF's   - Hold all NSAIDs and anticoagulants, unless contraindicated  - Please correct any coagulopathy with platelets and FFP for goal of platelets >50K and INR <2.0  - Please notify GI team if there is significant change in patient's clinical status    Thank you for involving us in the care of Javier Downs. Please call with any additional questions, concerns or changes in the patient's clinical status.     Luis Felipe Hilton DO  Gastroenterology Fellow PGY- IV  [facility]

## 2024-06-25 NOTE — ASSESSMENT & PLAN NOTE
Potassium 5.8 at time of admit up from 5.2 the day before. Per chart review seems some periods of elevated potassium however not one this elevated.     Plan:   - New ECG to check for T wave changes   - Will monitor with repeat BMPs   - Consider binders if continuing to uptrend or going for procedure with GI

## 2024-06-25 NOTE — ASSESSMENT & PLAN NOTE
Patient has metastatic cancer of colon primary. The cancer has metastasized to liver. The patient is under the care of an outpatient oncologist currently on active IV chemo.Their staging information is listed below.   Cancer Staging   Metastatic colon cancer to liver  Staging form: Colon and Rectum, AJCC 8th Edition  - Pathologic stage from 10/20/2022: Stage JOSE ROBERTO (ypT4a, pN1a, pM1a) - Signed by Bunny Schuster MD on 5/30/2023    Plan:   - Mild increase in AST levels however has had these bumps in the past   - Daily CMP to trend liver function  - Liver mets has been stable per family

## 2024-06-25 NOTE — ASSESSMENT & PLAN NOTE
Patient has acute blood loss due to hemorrhage, the hemorrhage is due to gastrointestinal bleed, patient does not have a propensity for bleeding.. Will trend hemoglobin/hematocrit Every 8 hours, as well as monitor and correct for any coagulation defects. CBC and vital signs have been reviewed and last CBC was noted-   Lab Results   Component Value Date    WBC 2.81 (L) 06/25/2024    HGB 8.1 (L) 06/25/2024    HCT 28.6 (L) 06/25/2024    MCV 85 06/25/2024     06/25/2024       Mr. Martinez is a 74 year old male with hx of colorectal cx with mets to the liver on active IV with [facility], and HTN here with 2 episodes of BRBPR that occurred the previous evening. He states this is brand new problem for him and he has never experienced this before. It is not associted with painful or other symptoms. He is not on blood thinners, he does not take NSAIDs. Denies any changes in diet.     Plan:   - Symptoms and location concern for lower GI bleed vs upper  - Out of caution will start IV PPI for now, de-escalate when appropriate   - GI consult   - NPO pending possible GI intervention   - Type and screen. Patient and family aware blood transfusion may be a possibility if counts continue to decline.   - Q8H CBC

## 2024-06-25 NOTE — ACP (ADVANCE CARE PLANNING)
Extensive ACP conversation had with patient and daughter at bedside. Patient wishes to remain full code. He understands that he can reach out to his medical team if he changes his mind in the future.

## 2024-06-25 NOTE — ED PROVIDER NOTES
Source of History:  Patient  Chart    Chief complaint:  Melena (Pt complaining of blood in his stool that he noticed today. Pt just started at home chemo for colon/liver cancer today. Pt also complaining of abdominal pain as well)      HPI:  Javier Downs is a 74 y.o. male with history of colorectal cancer with liver metastasis,  on chemotherapy,  status post right hemicolectomy (2022, Dr. Bonilla) hypertension,  presenting to emergency department with complaint of  blood in stool.      Patient states that he has had 2 episodes of bright and dark red blood per rectum this evening.  Both episodes were associated with diarrhea and lower abdominal pain.  No fevers.  No vomiting.  No history of similar complaints in the past.     Last colonoscopy was in November of 2023.   Found to have diverticulosis in the sigmoid colon and descending colon.  The ileocolonic anastomosis was patent and healthy appearing.   No prior endoscopies noted in chart.    Patient not anticoagulated.   Oncologist is Dr. Schuster.     Review of patient's allergies indicates:  No Known Allergies    Current Facility-Administered Medications on File Prior to Encounter   Medication Dose Route Frequency Provider Last Rate Last Admin    [COMPLETED] 0.9% NaCl 250 mL flush bag   Intravenous 1 time in Clinic/HOD Natividad Maher CNS   Stopped at 06/24/24 1327    [COMPLETED] atropine injection 0.4 mg  0.4 mg Intravenous Once PRN Natividad Maher CNS   0.4 mg at 06/24/24 1148    [COMPLETED] bevacizumab-awwb (MVASI) 300 mg in 0.9% NaCl 100 mL infusion  300 mg Intravenous 1 time in Clinic/HOD Natividad Maher CNS   Stopped at 06/24/24 1118    [COMPLETED] irinotecan (CAMPTOSAR) 240 mg in 0.9% NaCl 577 mL chemo infusion  240 mg Intravenous 1 time in Clinic/HOD Natividad Maher CNS   Stopped at 06/24/24 1321    [COMPLETED] palonosetron 0.25mg/dexAMETHasone 12mg in NS IVPB 0.25 mg 50 mL  0.25 mg Intravenous 1 time in Clinic/HOD Natividad Maher CNS   Stopped at  06/24/24 1143    [DISCONTINUED] alteplase injection 2 mg  2 mg Intra-Catheter PRN Natividad Maher, CNS        [DISCONTINUED] diphenhydrAMINE injection 50 mg  50 mg Intravenous Once PRN Natividad Maher, MCKAYLA        [DISCONTINUED] EPINEPHrine (EPIPEN) 0.3 mg/0.3 mL pen injection 0.3 mg  0.3 mg Intramuscular Once PRN Natividad Maher, MCKAYLA        [DISCONTINUED] fluorouraciL 2,200 mg/m2 = 3,695 mg in 0.9% NaCl 100 mL chemo infusion  2,200 mg/m2 (Treatment Plan Recorded) Intravenous over 46 hr Natividad Maher CNS   3,695 mg at 06/24/24 1327    [DISCONTINUED] heparin, porcine (PF) 100 unit/mL injection flush 500 Units  500 Units Intravenous PRN Natividad Maher, CNS        [DISCONTINUED] hydrocortisone sodium succinate injection 100 mg  100 mg Intravenous Once PRN Natividad Maher, MCKAYLA        [DISCONTINUED] prochlorperazine injection Soln 5 mg  5 mg Intravenous Once PRN Natividad Maher, CNS        [DISCONTINUED] sodium chloride 0.9% flush 10 mL  10 mL Intravenous PRN Natividad Maher, CNS         Current Outpatient Medications on File Prior to Encounter   Medication Sig Dispense Refill    acetaminophen (TYLENOL) 500 MG tablet Take 1 tablet (500 mg total) by mouth every 6 (six) hours as needed for Pain (alternate with ibuprofen).  0    amLODIPine (NORVASC) 10 MG tablet Take 1 tablet (10 mg total) by mouth once daily. 90 tablet 3    ibuprofen (ADVIL,MOTRIN) 400 MG tablet Take 1 tablet (400 mg total) by mouth every 6 (six) hours as needed for Other (pain). Alternate with tylenol      LIDOcaine-prilocaine (EMLA) cream Apply topically as needed (port application). 30 g 3    ondansetron (ZOFRAN) 4 MG tablet Take 1 tablet (4 mg total) by mouth every 8 (eight) hours as needed for Nausea. 30 tablet 3    oxyCODONE (ROXICODONE) 5 MG immediate release tablet Take 1 tablet (5 mg total) by mouth every 6 (six) hours as needed for Pain. 20 tablet 0    tamsulosin (FLOMAX) 0.4 mg Cap Take 1 capsule (0.4 mg total) by mouth once daily. 30  capsule 5       PMH:  As per HPI and below:  Past Medical History:   Diagnosis Date    MARIA A (acute kidney injury) 2022    Hypertension     Metastatic colon cancer to liver 2021     Past Surgical History:   Procedure Laterality Date    COLONOSCOPY N/A 2021    Procedure: COLONOSCOPY with possible stent;  Surgeon: EDILMA Bonilla MD;  Location: [facility] (2ND FLR);  Service: Colon and Rectal;  Laterality: N/A;    COLONOSCOPY N/A 11/3/2023    Procedure: COLONOSCOPY;  Surgeon: EDILMA Bonilla MD;  Location: [facility] ENDO (4TH FLR);  Service: Endoscopy;  Laterality: N/A;  prep instructions sent to pt via portal  From: EDILMA Bonilla MD  Procedure: Colonoscopy  Diagnosis: Surveillance colonoscopy - Hx of colon cancer  Procedure Timin-12 weeks  Provider: Myself  Location: [facility] 4Endo  Additional Scheduling Information: No scheduling con    DIAGNOSTIC LAPAROSCOPY N/A 2022    Procedure: LAPAROSCOPY, DIAGNOSTIC;  Surgeon: Adrian Rodriguez MD;  [facility];  Service: General;  Laterality: N/A;    INSERTION OF TUNNELED CENTRAL VENOUS CATHETER (CVC) WITH SUBCUTANEOUS PORT N/A 5/3/2021    Procedure: [id number];  Surgeon: Francisco Layton MD;  [facility];  Service: Vascular;  Laterality: N/A;    OMENTECTOMY N/A 10/20/2022    Procedure: OMENTECTOMY;  Surgeon: EDILMA Bonilla MD;  [facility];  Service: Colon and Rectal;  Laterality: N/A;    RIGHT HEMICOLECTOMY N/A 10/20/2022    Procedure: HEMICOLECTOMY, RIGHT, extended;  Surgeon: EDILMA Bonilla MD;  [facility];  Service: Colon and Rectal;  Laterality: N/A;  Extended right hemicolectomy CONSENT IN AM       Social History     Socioeconomic History    Marital status:    Tobacco Use    Smoking status: Never    Smokeless tobacco: Never   Substance and Sexual Activity    Alcohol use: Not Currently    Drug use: Never    Sexual activity: Not Currently     Partners: Female     Social  Determinants of Health     Food Insecurity: No Food Insecurity (5/13/2024)    Hunger Vital Sign     Worried About Running Out of Food in the Last Year: Never true     Ran Out of Food in the Last Year: Never true       Family History   Problem Relation Name Age of Onset    Cancer Brother         Physical Exam:      Vitals:    06/25/24 0416   BP:    Pulse: 70   Resp:    Temp:      Gen: No acute distress.  Nontoxic.  Well appearing.  Mental Status:  Alert and oriented .  Appropriate, conversant.  Skin: Warm, dry. No rashes seen.  Eyes: No conjunctival injection.  Pulm: CTAB. No increased work of breathing.  No significant tachypnea.  No audible stridor or wheezing.  No conversational dyspnea.    CV: Regular rate. Regular rhythm.   Abd: Soft.  Not distended.   Tenderness to palpation of the low abdomen, left greater than right.  No rebound tenderness or guarding.  MSK: Good range of motion all joints.  No deformities.    Neuro: Awake. Speech normal. No focal neuro deficit observed.      Laboratory Studies:  Labs Reviewed   CBC W/ AUTO DIFFERENTIAL - Abnormal; Notable for the following components:       Result Value    WBC 2.81 (*)     RBC 3.35 (*)     Hemoglobin 8.1 (*)     Hematocrit 28.6 (*)     MCH 24.2 (*)     MCHC 28.3 (*)     RDW 21.0 (*)     Lymph # 0.2 (*)     Mono # 0.1 (*)     Gran % 91.5 (*)     Lymph % 6.0 (*)     Mono % 2.1 (*)     All other components within normal limits   COMPREHENSIVE METABOLIC PANEL - Abnormal; Notable for the following components:    Potassium 5.8 (*)     Chloride 111 (*)     CO2 19 (*)     Glucose 182 (*)     Calcium 8.3 (*)     Albumin 2.8 (*)     Total Bilirubin 1.3 (*)     AST 63 (*)     All other components within normal limits   LIPASE - Abnormal; Notable for the following components:    Lipase 62 (*)     All other components within normal limits   URINALYSIS, REFLEX TO URINE CULTURE - Abnormal; Notable for the following components:    Specific Gravity, UA >1.030 (*)     All  other components within normal limits    Narrative:     Specimen Source->Urine   SARS-COV-2 RNA AMPLIFICATION, QUAL   HIV 1 / 2 ANTIBODY   HEPATITIS C ANTIBODY   COMPREHENSIVE METABOLIC PANEL   MAGNESIUM   PHOSPHORUS   CBC W/ AUTO DIFFERENTIAL   TYPE & SCREEN   ISTAT CHEM8       Chart reviewed.     Imaging Results              CTA Acute GI [place], Abdomen and Pelvis (Final result)  Result time 06/25/24 02:27:16      Final result by Pito Archer MD (06/25/24 02:27:16)                   Impression:      No evidence of luminal extravasation or pooling of contrast to suggest upper or lower GI bleed.    Colonic diverticulosis without evidence of acute diverticulitis.    Chronic portal vein thrombus.    Postsurgical change of right hemicolectomy for history of colon cancer with hepatic metastasis status post multiple radio embolizations.  Similar appearance of multiple hypodense lesions, largest in the right hepatic dome which demonstrates heterogeneous enhancement lobulated margins remains concerning for viable tumor.    Additional stable findings as above.    Electronically signed by resident: Eneida Castañeda  Date:    06/25/2024  Time:    01:34    Electronically signed by: Pito Archer MD  Date:    06/25/2024  Time:    02:27               Narrative:    EXAMINATION:  CTA ACUTE GI BLEED, ABDOMEN AND PELVIS    CLINICAL HISTORY:  Lower GI bleeding;    TECHNIQUE:  Initial noncontrast  images were obtained of the abdomen and pelvis.  CT axial angiography images were then obtained from the lung bases to the pubic symphysis following the intravenous administration of 75 of Omnipaque 350with delayed images obtained per GI bleeding protocol.  Sagittal and coronal reformats were provided.    COMPARISON:  CT chest abdomen pelvis 06/06/2024    FINDINGS:  Heart: Normal in size.  No pericardial effusion.    Lung bases: Symmetrically expanded.  No focal consolidation or mass in the visualized lung bases.  No pleural  effusion.  Dependent bibasilar subsegmental atelectasis.    Liver: Normal size.  Extensive post treatment changes to the liver from multiple prior radioembolization is.  Multiple hypodense lesions, stable in size and distribution compared to prior.  No definite new lesion.  Small volume perihepatic fluid at the right hepatic dome, unchanged.    Gallbladder: Decompressed with calcified gallstones.  No pericholecystic inflammatory change.    Bile Ducts: No intra or extrahepatic biliary ductal dilation.    Pancreas: No mass.  No ductal dilatation or peripancreatic inflammatory change.    Spleen: Unremarkable. Accessory splenule.    Adrenals: Unremarkable.    Kidneys/ Ureters: Normal in size with bilateral symmetric enhancement.  Bilateral subcentimeter cortical hypodensities, too small to characterize.  No nephrolithiasis.  No hydronephrosis.  No ureteral dilatation.  The urinary bladder is distended with mild circumferential bladder wall thickening    Reproductive organs: Prostatomegaly.    GI Tract/Mesentery: Stomach is distended with heterogeneous ingested material.  Postoperative change right hemicolectomy.  Visualized loops of small and large bowel are normal in caliber without obstruction, focal wall thickening or evidence of inflammation.  No luminal extravasation or pooling of contrast to suggest active gastrointestinal hemorrhage.  Colonic diverticulosis.    Peritoneal Space: No abdominopelvic free fluid.  No intraperitoneal free air.  Prominent small bowel mesenteric lymph nodes, nonenlarged by size criteria and likely reactive.  No lymphadenopathy.    Retroperitoneum: No lymphadenopathy.    Abdominal wall/extraperitoneal soft tissues: Unremarkable.    Vasculature: Abdominal aorta is normal in caliber, contour, and course without significant calcific atherosclerosis.  Chronic thrombus at the portal vein confluence involving the SMV and splenic vein, unchanged esophageal and gastric varices.  Prominent  portosystemic collateral vessels and cavernous transformation at the gina hepatis.    Bones: No acute fracture or destructive osseous lesion.                                      Medications Given:  Medications   sodium chloride 0.9% flush 10 mL (has no administration in time range)   naloxone 0.4 mg/mL injection 0.02 mg (has no administration in time range)   glucose chewable tablet 16 g (has no administration in time range)   glucose chewable tablet 24 g (has no administration in time range)   glucagon (human recombinant) injection 1 mg (has no administration in time range)   dextrose 10% bolus 125 mL 125 mL (has no administration in time range)   dextrose 10% bolus 250 mL 250 mL (has no administration in time range)   pantoprazole injection 80 mg (has no administration in time range)   pantoprazole injection 40 mg (has no administration in time range)   iohexoL (OMNIPAQUE 350) injection 75 mL (75 mLs Intravenous Given 6/25/24 0106)       Discussed with: CAROLINE    MDM:    74 y.o. male with Colorectal cancer status post right hemicolectomy, presenting to emergency department with complaint of 2 episodes of bright red blood per rectum.  Afebrile and hemodynamically stable.      Two point drop in hemoglobin here, not yet with in transfusion range.      CTA abdomen pelvis negative for active arterial extravasation.  No evidence of diverticulitis.  Remains hemodynamically stable.  Case discussed with hospital medicine.    Diagnostic Impression:    1. Gastrointestinal hemorrhage, unspecified gastrointestinal hemorrhage type    2. Chest pain    3. Hyperkalemia         ED Disposition Condition    Observation Stable               Patient understands the plan and is in agreement, verbalized understanding, questions answered    Kaylyn Garcia MD  Emergency Medicine         Kaylyn Garcia MD  06/25/24 4569

## 2024-06-25 NOTE — ED NOTES
I-STAT Chem-8+ Results:   Value Reference Range   Sodium 136 136-145 mmol/L   Potassium  >9 3.5-5.1 mmol/L   Chloride 110  mmol/L   Ionized Calcium 0.98 1.06-1.42 mmol/L   CO2 (measured) 22 23-29 mmol/L   Glucose 182  mg/dL   BUN 31 6-30 mg/dL   Creatinine 1.2 0.5-1.4 mg/dL   Hematocrit 25 36-54%

## 2024-06-25 NOTE — SUBJECTIVE & OBJECTIVE
Past Medical History:   Diagnosis Date    MARIA A (acute kidney injury) 2022    Hypertension     Metastatic colon cancer to liver 2021       Past Surgical History:   Procedure Laterality Date    COLONOSCOPY N/A 2021    Procedure: COLONOSCOPY with possible stent;  Surgeon: EDILMA Bonilla MD;  Location: [facility] ENDO (2ND FLR);  Service: Colon and Rectal;  Laterality: N/A;    COLONOSCOPY N/A 11/3/2023    Procedure: COLONOSCOPY;  Surgeon: EDILMA Bonilla MD;  Location: [facility] ENDO (4TH FLR);  Service: Endoscopy;  Laterality: N/A;  prep instructions sent to pt via portal  From: EDILMA Bonilla MD  Procedure: Colonoscopy  Diagnosis: Surveillance colonoscopy - Hx of colon cancer  Procedure Timin-12 weeks  Provider: Myself  Location: [address]  Additional Scheduling Information: No scheduling con    DIAGNOSTIC LAPAROSCOPY N/A 2022    Procedure: LAPAROSCOPY, DIAGNOSTIC;  Surgeon: Adrian Rodriguez MD;  [facility]  Service: General;  Laterality: N/A;    INSERTION OF TUNNELED CENTRAL VENOUS CATHETER (CVC) WITH SUBCUTANEOUS PORT N/A 5/3/2021    Procedure: [id number];  Surgeon: Francisco Layton MD;  Location: [facility] OR 2ND FLR;  Service: Vascular;  Laterality: N/A;    OMENTECTOMY N/A 10/20/2022    Procedure: OMENTECTOMY;  Surgeon: EDILMA Bonilla MD;  [facility];  Service: Colon and Rectal;  Laterality: N/A;    RIGHT HEMICOLECTOMY N/A 10/20/2022    Procedure: HEMICOLECTOMY, RIGHT, extended;  Surgeon: EDILMA Bonilla MD;  [facility];  Service: Colon and Rectal;  Laterality: N/A;  Extended right hemicolectomy CONSENT IN AM       Review of patient's allergies indicates:  No Known Allergies    Current Facility-Administered Medications on File Prior to Encounter   Medication    [COMPLETED] 0.9% NaCl 250 mL flush bag    [COMPLETED] atropine injection 0.4 mg    [COMPLETED] bevacizumab-awwb (MVASI) 300 mg in 0.9% NaCl 100 mL infusion    [COMPLETED] irinotecan  (CAMPTOSAR) 240 mg in 0.9% NaCl 577 mL chemo infusion    [COMPLETED] palonosetron 0.25mg/dexAMETHasone 12mg in NS IVPB 0.25 mg 50 mL    [DISCONTINUED] alteplase injection 2 mg    [DISCONTINUED] diphenhydrAMINE injection 50 mg    [DISCONTINUED] EPINEPHrine (EPIPEN) 0.3 mg/0.3 mL pen injection 0.3 mg    [DISCONTINUED] fluorouraciL 2,200 mg/m2 = 3,695 mg in 0.9% NaCl 100 mL chemo infusion    [DISCONTINUED] heparin, porcine (PF) 100 unit/mL injection flush 500 Units    [DISCONTINUED] hydrocortisone sodium succinate injection 100 mg    [DISCONTINUED] prochlorperazine injection Soln 5 mg    [DISCONTINUED] sodium chloride 0.9% flush 10 mL     Current Outpatient Medications on File Prior to Encounter   Medication Sig    acetaminophen (TYLENOL) 500 MG tablet Take 1 tablet (500 mg total) by mouth every 6 (six) hours as needed for Pain (alternate with ibuprofen).    amLODIPine (NORVASC) 10 MG tablet Take 1 tablet (10 mg total) by mouth once daily.    ibuprofen (ADVIL,MOTRIN) 400 MG tablet Take 1 tablet (400 mg total) by mouth every 6 (six) hours as needed for Other (pain). Alternate with tylenol    LIDOcaine-prilocaine (EMLA) cream Apply topically as needed (port application).    ondansetron (ZOFRAN) 4 MG tablet Take 1 tablet (4 mg total) by mouth every 8 (eight) hours as needed for Nausea.    oxyCODONE (ROXICODONE) 5 MG immediate release tablet Take 1 tablet (5 mg total) by mouth every 6 (six) hours as needed for Pain.    tamsulosin (FLOMAX) 0.4 mg Cap Take 1 capsule (0.4 mg total) by mouth once daily.     Family History       Problem Relation (Age of Onset)    Cancer Brother          Tobacco Use    Smoking status: Never    Smokeless tobacco: Never   Substance and Sexual Activity    Alcohol use: Not Currently    Drug use: Never    Sexual activity: Not Currently     Partners: Female     Review of Systems   Constitutional:  Negative for appetite change, chills, diaphoresis, fatigue and fever.   Respiratory:  Negative for  shortness of breath and wheezing.    Cardiovascular:  Negative for chest pain and leg swelling.   Gastrointestinal:  Positive for anal bleeding and blood in stool. Negative for abdominal distention, abdominal pain, constipation, diarrhea, nausea, rectal pain and vomiting.   Psychiatric/Behavioral:  Negative for agitation, behavioral problems and confusion.      Objective:     Vital Signs (Most Recent):  Temp: 97.5 °F (36.4 °C) (06/25/24 0403)  Pulse: 70 (06/25/24 0403)  Resp: 18 (06/25/24 0403)  BP: 125/64 (06/25/24 0403)  SpO2: 99 % (06/25/24 0403) Vital Signs (24h Range):  Temp:  [97.5 °F (36.4 °C)-98.5 °F (36.9 °C)] 97.5 °F (36.4 °C)  Pulse:  [66-83] 70  Resp:  [15-20] 18  SpO2:  [98 %-100 %] 99 %  BP: (112-125)/(63-78) 125/64     Weight: 64 kg (141 lb 1.5 oz)  Body mass index is 22.1 kg/m².     Physical Exam  Constitutional:       General: He is not in acute distress.     Appearance: Normal appearance. He is normal weight. He is not ill-appearing, toxic-appearing or diaphoretic.   HENT:      Head: Normocephalic and atraumatic.   Cardiovascular:      Rate and Rhythm: Normal rate and regular rhythm.      Pulses: Normal pulses.      Heart sounds: Normal heart sounds. No murmur heard.  Pulmonary:      Effort: No respiratory distress.      Breath sounds: No wheezing.   Abdominal:      General: There is no distension.      Tenderness: There is no abdominal tenderness.   Musculoskeletal:         General: No swelling.      Right lower leg: No edema.      Left lower leg: No edema.   Skin:     General: Skin is warm and dry.      Coloration: Skin is not jaundiced.   Neurological:      General: No focal deficit present.      Mental Status: He is alert and oriented to person, place, and time. Mental status is at baseline.   Psychiatric:         Mood and Affect: Mood normal.         Behavior: Behavior normal.                Significant Labs: All pertinent labs within the past 24 hours have been reviewed.    Significant  Imaging: I have reviewed all pertinent imaging results/findings within the past 24 hours.

## 2024-06-25 NOTE — ASSESSMENT & PLAN NOTE
Hx of microcytic anemia in setting of possible GI bleed.    Plan:  - CBC q8H to trend  - New iron panel given hx

## 2024-06-25 NOTE — NURSING
Nurses Note -- 4 Eyes      6/25/2024   6:07 AM      Skin assessed during: Admit      [x] No Altered Skin Integrity Present    []Prevention Measures Documented      [] Yes- Altered Skin Integrity Present or Discovered   [] LDA Added if Not in Epic (Describe Wound)   [] New Altered Skin Integrity was Present on Admit and Documented in LDA   [] Wound Image Taken    Wound Care Consulted? No    Attending Nurse:  Caitie Hines RN/Staff Member:  ARACELIS Carrion

## 2024-06-25 NOTE — PLAN OF CARE
[facility]  Initial Discharge Assessment       Primary Care Provider: [facility]    Admission Diagnosis: Hyperkalemia [E87.5]  Chest pain [R07.9]  Gastrointestinal hemorrhage, unspecified gastrointestinal hemorrhage type [K92.2]    Admission Date: 6/24/2024  Expected Discharge Date: 6/27/2024    Transition of Care Barriers: None    Payor: HUMANA MANAGED MEDICARE / Plan: HUMANA SNP HMO PPO SPECIAL NEEDS / Product Type: Medicare Advantage /     Extended Emergency Contact Information  Primary Emergency Contact: Carrie Downs  Mobile Phone: [phone]  Relation: Daughter  Secondary Emergency Contact: YareliKarl  Mobile Phone: [phone]  Relation: Son    Discharge Plan A: Home with family  Discharge Plan B: Home Health      [facility]  [address] [address]  Phone: [phone] Fax: [phone]    [facility]  [address]  [place] [address]  Phone: [phone] Fax: [phone]      Initial Assessment (most recent)       Adult Discharge Assessment - 06/25/24 1631          Discharge Assessment    Assessment Type Discharge Planning Assessment     Confirmed/corrected address, phone number and insurance Yes     Confirmed Demographics Correct on Facesheet     Source of Information patient     When was your last doctors appointment? 06/24/24     Communicated JOSEFA with patient/caregiver Yes     People in Home spouse;child(marli), adult     Do you expect to return to your current living situation? Yes     Do you have help at home or someone to help you manage your care at home? Yes     Who are your caregiver(s) and their phone number(s)? Wife     Prior to hospitilization cognitive status: Alert/Oriented     Current cognitive status: Alert/Oriented     Home Layout Able to live on 1st floor     Do you take prescription medications? Yes     Do you have prescription coverage? Yes     Do you have any problems affording any of your prescribed  medications? No     Is the patient taking medications as prescribed? yes     Who is going to help you get home at discharge? wife     Are you on dialysis? No     Do you take coumadin? No     Discharge Plan A Home with family     Discharge Plan B Home Health     Discharge Plan discussed with: Patient     Transition of Care Barriers None     SDOH --   no       Physical Activity    On average, how many days per week do you engage in moderate to strenuous exercise (like a brisk walk)? 3 days     On average, how many minutes do you engage in exercise at this level? 30 min        Financial Resource Strain    How hard is it for you to pay for the very basics like food, housing, medical care, and heating? Not hard at all        Housing Stability    In the last 12 months, was there a time when you were not able to pay the mortgage or rent on time? No     At any time in the past 12 months, were you homeless or living in a shelter (including now)? No        Transportation Needs    Has the lack of transportation kept you from medical appointments, meetings, work or from getting things needed for daily living? No        Food Insecurity    Within the past 12 months, you worried that your food would run out before you got the money to buy more. Never true     Within the past 12 months, the food you bought just didn't last and you didn't have money to get more. Never true        Stress    Do you feel stress - tense, restless, nervous, or anxious, or unable to sleep at night because your mind is troubled all the time - these days? Only a little        Social Isolation    How often do you feel lonely or isolated from those around you?  Never        Alcohol Use    Q1: How often do you have a drink containing alcohol? Never     Q2: How many drinks containing alcohol do you have on a typical day when you are drinking? Patient does not drink     Q3: How often do you have six or more drinks on one occasion? Never        Utilities    In  the past 12 months has the electric, gas, oil, or water company threatened to shut off services in your home? No        Health Literacy    How often do you need to have someone help you when you read instructions, pamphlets, or other written material from your doctor or pharmacy? Never                   The CM met with the patient at bedside to complete the DPA. The CM placed name and contact information on the blackboard in the patient's room. Use preferred pharmacy / bedside delivery for any necessary  medications at the time of discharge.The patient is independent with all ADLs. The patient is not on Dialysis or Coumadin. The patient's wife will provide assistance to the patient upon discharge. The patient's wife will provide transportation upon discharge .  The CM will continue to follow for course of hospitalization.

## 2024-06-25 NOTE — PLAN OF CARE
Inpatient Upgrade Note    Javier Downs has warranted treatment spanning two or more midnights of hospital level care for the management of GI bleed. He continues to require IV fluids, daily labs, further testing/imaging, and further evaluation by consultants. His condition is also complicated by the following comorbidities: Immunosuppression and Malignancy.

## 2024-06-25 NOTE — H&P
[facility]  History & Physical    Patient Name: Javier Downs  MRN: [id number]  Patient Class: OP- Observation  Admission Date: 6/24/2024  Attending Physician: Aldo Jaime MD   Primary Care Provider: [facility]         Patient information was obtained from patient, relative(s), past medical records, and ER records.     Subjective:     Principal Problem:GI bleed    Chief Complaint:   Chief Complaint   Patient presents with    Melena     Pt complaining of blood in his stool that he noticed today. Pt just started at home chemo for colon/liver cancer today. Pt also complaining of abdominal pain as well        HPI: Mr. Martinez is a 74 year old male with hx of colorectal cx with mets to the liver on active IV with [facility], and HTN here with 2 episodes of BRBPR that occurred the previous evening. He states this is brand new problem for him and he has never experienced this before. It is not associted with painful or other symptoms. He is not on blood thinners, he does not take NSAIDs. Symptoms have not reoccurred per patient since arriving to ED but is unsure. Denies any changes in diet. Only medication CCB for blood pressure.       In the ED, he was found to be HDS with a 2 point drop in Hgb from yesterday's labs. CTA negative. Hyperkalemic at 5.8. Patient admitted to  for concerns of lower GI bleed in setting of colon cx hx on active chemo.      Past Medical History:   Diagnosis Date    MARIA A (acute kidney injury) 8/18/2022    Hypertension     Metastatic colon cancer to liver 4/22/2021       Past Surgical History:   Procedure Laterality Date    COLONOSCOPY N/A 4/20/2021    Procedure: COLONOSCOPY with possible stent;  Surgeon: EDILMA Bonilla MD;  Location: [facility] (2ND FLR);  Service: Colon and Rectal;  Laterality: N/A;    COLONOSCOPY N/A 11/3/2023    Procedure: COLONOSCOPY;  Surgeon: EDILMA Bonilla MD;  Location: [facility] (4TH FLR);  Service: Endoscopy;  Laterality:  N/A;  prep instructions sent to pt via portal  From: EDILMA Bonilla MD  Procedure: Colonoscopy  Diagnosis: Surveillance colonoscopy - Hx of colon cancer  Procedure Timin-12 weeks  Provider: Myself  Location: [address]  Additional Scheduling Information: No scheduling con    DIAGNOSTIC LAPAROSCOPY N/A 2022    Procedure: LAPAROSCOPY, DIAGNOSTIC;  Surgeon: Adrian Rodriguez MD;  Location: [facility] OR 2ND FLR;  Service: General;  Laterality: N/A;    INSERTION OF TUNNELED CENTRAL VENOUS CATHETER (CVC) WITH SUBCUTANEOUS PORT N/A 5/3/2021    Procedure: [id number];  Surgeon: Francisco Layton MD;  Location: [facility] OR 2ND FLR;  Service: Vascular;  Laterality: N/A;    OMENTECTOMY N/A 10/20/2022    Procedure: OMENTECTOMY;  Surgeon: EDILMA Bonilla MD;  Location: [facility] OR 2ND FLR;  Service: Colon and Rectal;  Laterality: N/A;    RIGHT HEMICOLECTOMY N/A 10/20/2022    Procedure: HEMICOLECTOMY, RIGHT, extended;  Surgeon: EDILMA Bonilla MD;  Location: [facility] OR 2ND FLR;  Service: Colon and Rectal;  Laterality: N/A;  Extended right hemicolectomy CONSENT IN AM       Review of patient's allergies indicates:  No Known Allergies    Current Facility-Administered Medications on File Prior to Encounter   Medication    [COMPLETED] 0.9% NaCl 250 mL flush bag    [COMPLETED] atropine injection 0.4 mg    [COMPLETED] bevacizumab-awwb (MVASI) 300 mg in 0.9% NaCl 100 mL infusion    [COMPLETED] irinotecan (CAMPTOSAR) 240 mg in 0.9% NaCl 577 mL chemo infusion    [COMPLETED] palonosetron 0.25mg/dexAMETHasone 12mg in NS IVPB 0.25 mg 50 mL    [DISCONTINUED] alteplase injection 2 mg    [DISCONTINUED] diphenhydrAMINE injection 50 mg    [DISCONTINUED] EPINEPHrine (EPIPEN) 0.3 mg/0.3 mL pen injection 0.3 mg    [DISCONTINUED] fluorouraciL 2,200 mg/m2 = 3,695 mg in 0.9% NaCl 100 mL chemo infusion    [DISCONTINUED] heparin, porcine (PF) 100 unit/mL injection flush 500 Units    [DISCONTINUED] hydrocortisone sodium succinate injection 100 mg     [DISCONTINUED] prochlorperazine injection Soln 5 mg    [DISCONTINUED] sodium chloride 0.9% flush 10 mL     Current Outpatient Medications on File Prior to Encounter   Medication Sig    acetaminophen (TYLENOL) 500 MG tablet Take 1 tablet (500 mg total) by mouth every 6 (six) hours as needed for Pain (alternate with ibuprofen).    amLODIPine (NORVASC) 10 MG tablet Take 1 tablet (10 mg total) by mouth once daily.    ibuprofen (ADVIL,MOTRIN) 400 MG tablet Take 1 tablet (400 mg total) by mouth every 6 (six) hours as needed for Other (pain). Alternate with tylenol    LIDOcaine-prilocaine (EMLA) cream Apply topically as needed (port application).    ondansetron (ZOFRAN) 4 MG tablet Take 1 tablet (4 mg total) by mouth every 8 (eight) hours as needed for Nausea.    oxyCODONE (ROXICODONE) 5 MG immediate release tablet Take 1 tablet (5 mg total) by mouth every 6 (six) hours as needed for Pain.    tamsulosin (FLOMAX) 0.4 mg Cap Take 1 capsule (0.4 mg total) by mouth once daily.     Family History       Problem Relation (Age of Onset)    Cancer Brother          Tobacco Use    Smoking status: Never    Smokeless tobacco: Never   Substance and Sexual Activity    Alcohol use: Not Currently    Drug use: Never    Sexual activity: Not Currently     Partners: Female     Review of Systems   Constitutional:  Negative for appetite change, chills, diaphoresis, fatigue and fever.   Respiratory:  Negative for shortness of breath and wheezing.    Cardiovascular:  Negative for chest pain and leg swelling.   Gastrointestinal:  Positive for anal bleeding and blood in stool. Negative for abdominal distention, abdominal pain, constipation, diarrhea, nausea, rectal pain and vomiting.   Psychiatric/Behavioral:  Negative for agitation, behavioral problems and confusion.      Objective:     Vital Signs (Most Recent):  Temp: 97.5 °F (36.4 °C) (06/25/24 0403)  Pulse: 70 (06/25/24 0403)  Resp: 18 (06/25/24 0403)  BP: 125/64 (06/25/24 0403)  SpO2: 99 %  (06/25/24 0403) Vital Signs (24h Range):  Temp:  [97.5 °F (36.4 °C)-98.5 °F (36.9 °C)] 97.5 °F (36.4 °C)  Pulse:  [66-83] 70  Resp:  [15-20] 18  SpO2:  [98 %-100 %] 99 %  BP: (112-125)/(63-78) 125/64     Weight: 64 kg (141 lb 1.5 oz)  Body mass index is 22.1 kg/m².     Physical Exam  Constitutional:       General: He is not in acute distress.     Appearance: Normal appearance. He is normal weight. He is not ill-appearing, toxic-appearing or diaphoretic.   HENT:      Head: Normocephalic and atraumatic.   Cardiovascular:      Rate and Rhythm: Normal rate and regular rhythm.      Pulses: Normal pulses.      Heart sounds: Normal heart sounds. No murmur heard.  Pulmonary:      Effort: No respiratory distress.      Breath sounds: No wheezing.   Abdominal:      General: There is no distension.      Tenderness: There is no abdominal tenderness.   Musculoskeletal:         General: No swelling.      Right lower leg: No edema.      Left lower leg: No edema.   Skin:     General: Skin is warm and dry.      Coloration: Skin is not jaundiced.   Neurological:      General: No focal deficit present.      Mental Status: He is alert and oriented to person, place, and time. Mental status is at baseline.   Psychiatric:         Mood and Affect: Mood normal.         Behavior: Behavior normal.                Significant Labs: All pertinent labs within the past 24 hours have been reviewed.    Significant Imaging: I have reviewed all pertinent imaging results/findings within the past 24 hours.    Assessment/Plan:     * GI bleed  Patient has acute blood loss due to hemorrhage, the hemorrhage is due to gastrointestinal bleed, patient does not have a propensity for bleeding.. Will trend hemoglobin/hematocrit Every 8 hours, as well as monitor and correct for any coagulation defects. CBC and vital signs have been reviewed and last CBC was noted-   Lab Results   Component Value Date    WBC 2.81 (L) 06/25/2024    HGB 8.1 (L) 06/25/2024    HCT 28.6  (L) 06/25/2024    MCV 85 06/25/2024     06/25/2024       Mr. Martinez is a 74 year old male with hx of colorectal cx with mets to the liver on active IV with [facility], and HTN here with 2 episodes of BRBPR that occurred the previous evening. He states this is brand new problem for him and he has never experienced this before. It is not associted with painful or other symptoms. He is not on blood thinners, he does not take NSAIDs. Denies any changes in diet.     Plan:   - Symptoms and location concern for lower GI bleed vs upper  - Out of caution will start IV PPI for now, de-escalate when appropriate   - GI consult   - NPO pending possible GI intervention   - Type and screen. Patient and family aware blood transfusion may be a possibility if counts continue to decline.   - Q8H CBC           Hyperkalemia  Potassium 5.8 at time of admit up from 5.2 the day before. Per chart review seems some periods of elevated potassium however not one this elevated.     Plan:   - New ECG to check for T wave changes   - Will monitor with repeat BMPs   - Consider binders if continuing to uptrend or going for procedure with GI           Metastatic colon cancer to liver  Patient has metastatic cancer of colon primary. The cancer has metastasized to liver. The patient is under the care of an outpatient oncologist currently on active IV chemo.Their staging information is listed below.   Cancer Staging   Metastatic colon cancer to liver  Staging form: Colon and Rectum, AJCC 8th Edition  - Pathologic stage from 10/20/2022: Stage JOSE ROBERTO (ypT4a, pN1a, pM1a) - Signed by Bunny Schuster MD on 5/30/2023    Plan:   - Mild increase in AST levels however has had these bumps in the past   - Daily CMP to trend liver function  - Liver mets has been stable per family       Microcytic anemia  Hx of microcytic anemia in setting of possible GI bleed.    Plan:  - CBC q8H to trend  - New iron panel given hx      Hypertension  Bp stable at  time of admit. Takes amlodipine at home.     Plan:  - Holding in setting of bleed, restart when appropriate       VTE Risk Mitigation (From admission, onward)           Ordered     IP VTE HIGH RISK PATIENT  Once         06/25/24 0329     Place sequential compression device  Until discontinued         06/25/24 0329     Reason for No Pharmacological VTE Prophylaxis  Once        Question:  Reasons:  Answer:  Active Bleeding    06/25/24 0329                    [place], DO  Department of Hospital Medicine  [facility]

## 2024-06-25 NOTE — PLAN OF CARE
Problem: Adult Inpatient Plan of Care  Goal: Plan of Care Review  Outcome: Progressing  Goal: Patient-Specific Goal (Individualized)  Outcome: Progressing  Goal: Absence of Hospital-Acquired Illness or Injury  Outcome: Progressing  Goal: Optimal Comfort and Wellbeing  Outcome: Progressing  Goal: Readiness for Transition of Care  Outcome: Progressing     Problem: Acute Kidney Injury/Impairment  Goal: Fluid and Electrolyte Balance  Outcome: Progressing  Goal: Improved Oral Intake  Outcome: Progressing  Goal: Effective Renal Function  Outcome: Progressing     Problem: Coping Ineffective  Goal: Effective Coping  Outcome: Progressing

## 2024-06-25 NOTE — ASSESSMENT & PLAN NOTE
Bp stable at time of admit. Takes amlodipine at home.     Plan:  - Holding in setting of bleed, restart when appropriate

## 2024-06-25 NOTE — HPI
Mr. Martinez is a 74 year old male with hx of colorectal cx with mets to the liver on active IV with [facility], and HTN here with 2 episodes of BRBPR that occurred the previous evening. He states this is brand new problem for him and he has never experienced this before. It is not associted with painful or other symptoms. He is not on blood thinners, he does not take NSAIDs. Symptoms have not reoccurred per patient since arriving to ED but is unsure. Denies any changes in diet. Only medication CCB for blood pressure.       In the ED, he was found to be HDS with a 2 point drop in Hgb in a 36 hour period. CTA negative. Hyperkalemic at 5.8. Patient admitted to  for concerns of lower GI bleed in setting of colon cx hx on active chemo.

## 2024-06-25 NOTE — ED NOTES
Telemetry Verification   Patient placed on Telemetry Box  Verified on ED monitor  Box 0919   Monitor Tech     Rate 68   Rhythm  NSR

## 2024-06-26 VITALS
SYSTOLIC BLOOD PRESSURE: 104 MMHG | WEIGHT: 141.13 LBS | RESPIRATION RATE: 18 BRPM | HEIGHT: 67 IN | TEMPERATURE: 98 F | HEART RATE: 65 BPM | BODY MASS INDEX: 22.15 KG/M2 | DIASTOLIC BLOOD PRESSURE: 59 MMHG | OXYGEN SATURATION: 100 %

## 2024-06-26 PROBLEM — Z51.5 ENCOUNTER FOR PALLIATIVE CARE: Status: ACTIVE | Noted: 2024-06-26

## 2024-06-26 LAB
ALBUMIN SERPL BCP-MCNC: 2.8 G/DL (ref 3.5–5.2)
ALP SERPL-CCNC: 101 U/L (ref 55–135)
ALT SERPL W/O P-5'-P-CCNC: 34 U/L (ref 10–44)
ANION GAP SERPL CALC-SCNC: 4 MMOL/L (ref 8–16)
AST SERPL-CCNC: 43 U/L (ref 10–40)
BASOPHILS # BLD AUTO: 0 K/UL (ref 0–0.2)
BASOPHILS # BLD AUTO: 0 K/UL (ref 0–0.2)
BASOPHILS NFR BLD: 0 % (ref 0–1.9)
BASOPHILS NFR BLD: 0 % (ref 0–1.9)
BILIRUB SERPL-MCNC: 1.5 MG/DL (ref 0.1–1)
BUN SERPL-MCNC: 15 MG/DL (ref 8–23)
CALCIUM SERPL-MCNC: 8.4 MG/DL (ref 8.7–10.5)
CHLORIDE SERPL-SCNC: 114 MMOL/L (ref 95–110)
CO2 SERPL-SCNC: 23 MMOL/L (ref 23–29)
CREAT SERPL-MCNC: 0.9 MG/DL (ref 0.5–1.4)
DIFFERENTIAL METHOD BLD: ABNORMAL
DIFFERENTIAL METHOD BLD: ABNORMAL
EOSINOPHIL # BLD AUTO: 0 K/UL (ref 0–0.5)
EOSINOPHIL # BLD AUTO: 0 K/UL (ref 0–0.5)
EOSINOPHIL NFR BLD: 0 % (ref 0–8)
EOSINOPHIL NFR BLD: 0 % (ref 0–8)
ERYTHROCYTE [DISTWIDTH] IN BLOOD BY AUTOMATED COUNT: 19.6 % (ref 11.5–14.5)
ERYTHROCYTE [DISTWIDTH] IN BLOOD BY AUTOMATED COUNT: 19.9 % (ref 11.5–14.5)
EST. GFR  (NO RACE VARIABLE): >60 ML/MIN/1.73 M^2
GLUCOSE SERPL-MCNC: 106 MG/DL (ref 70–110)
HCT VFR BLD AUTO: 24.3 % (ref 40–54)
HCT VFR BLD AUTO: 25.1 % (ref 40–54)
HGB BLD-MCNC: 7.2 G/DL (ref 14–18)
HGB BLD-MCNC: 7.3 G/DL (ref 14–18)
IMM GRANULOCYTES # BLD AUTO: 0.02 K/UL (ref 0–0.04)
IMM GRANULOCYTES # BLD AUTO: 0.02 K/UL (ref 0–0.04)
IMM GRANULOCYTES NFR BLD AUTO: 0.4 % (ref 0–0.5)
IMM GRANULOCYTES NFR BLD AUTO: 0.5 % (ref 0–0.5)
LYMPHOCYTES # BLD AUTO: 0.3 K/UL (ref 1–4.8)
LYMPHOCYTES # BLD AUTO: 0.4 K/UL (ref 1–4.8)
LYMPHOCYTES NFR BLD: 11.1 % (ref 18–48)
LYMPHOCYTES NFR BLD: 5.5 % (ref 18–48)
MAGNESIUM SERPL-MCNC: 2.3 MG/DL (ref 1.6–2.6)
MCH RBC QN AUTO: 24.2 PG (ref 27–31)
MCH RBC QN AUTO: 24.7 PG (ref 27–31)
MCHC RBC AUTO-ENTMCNC: 29.1 G/DL (ref 32–36)
MCHC RBC AUTO-ENTMCNC: 29.6 G/DL (ref 32–36)
MCV RBC AUTO: 83 FL (ref 82–98)
MCV RBC AUTO: 83 FL (ref 82–98)
MONOCYTES # BLD AUTO: 0.4 K/UL (ref 0.3–1)
MONOCYTES # BLD AUTO: 0.6 K/UL (ref 0.3–1)
MONOCYTES NFR BLD: 11.1 % (ref 4–15)
MONOCYTES NFR BLD: 13 % (ref 4–15)
NEUTROPHILS # BLD AUTO: 3 K/UL (ref 1.8–7.7)
NEUTROPHILS # BLD AUTO: 3.7 K/UL (ref 1.8–7.7)
NEUTROPHILS NFR BLD: 77.3 % (ref 38–73)
NEUTROPHILS NFR BLD: 81.1 % (ref 38–73)
NRBC BLD-RTO: 0 /100 WBC
NRBC BLD-RTO: 0 /100 WBC
PHOSPHATE SERPL-MCNC: 3.2 MG/DL (ref 2.7–4.5)
PLATELET # BLD AUTO: 152 K/UL (ref 150–450)
PLATELET # BLD AUTO: 153 K/UL (ref 150–450)
PMV BLD AUTO: 10.7 FL (ref 9.2–12.9)
PMV BLD AUTO: 11.6 FL (ref 9.2–12.9)
POCT GLUCOSE: 106 MG/DL (ref 70–110)
POCT GLUCOSE: 98 MG/DL (ref 70–110)
POTASSIUM SERPL-SCNC: 4.8 MMOL/L (ref 3.5–5.1)
PROT SERPL-MCNC: 5.5 G/DL (ref 6–8.4)
RBC # BLD AUTO: 2.92 M/UL (ref 4.6–6.2)
RBC # BLD AUTO: 3.02 M/UL (ref 4.6–6.2)
SODIUM SERPL-SCNC: 141 MMOL/L (ref 136–145)
WBC # BLD AUTO: 3.87 K/UL (ref 3.9–12.7)
WBC # BLD AUTO: 4.53 K/UL (ref 3.9–12.7)

## 2024-06-26 PROCEDURE — 63600175 PHARM REV CODE 636 W HCPCS

## 2024-06-26 PROCEDURE — 84100 ASSAY OF PHOSPHORUS: CPT

## 2024-06-26 PROCEDURE — 36415 COLL VENOUS BLD VENIPUNCTURE: CPT

## 2024-06-26 PROCEDURE — 80053 COMPREHEN METABOLIC PANEL: CPT

## 2024-06-26 PROCEDURE — 85025 COMPLETE CBC W/AUTO DIFF WBC: CPT | Performed by: INTERNAL MEDICINE

## 2024-06-26 PROCEDURE — 83735 ASSAY OF MAGNESIUM: CPT

## 2024-06-26 PROCEDURE — 25000003 PHARM REV CODE 250: Performed by: INTERNAL MEDICINE

## 2024-06-26 PROCEDURE — C9113 INJ PANTOPRAZOLE SODIUM, VIA: HCPCS

## 2024-06-26 PROCEDURE — 36415 COLL VENOUS BLD VENIPUNCTURE: CPT | Performed by: INTERNAL MEDICINE

## 2024-06-26 RX ORDER — HEPARIN 100 UNIT/ML
10 SYRINGE INTRAVENOUS ONCE AS NEEDED
Status: DISCONTINUED | OUTPATIENT
Start: 2024-06-26 | End: 2024-06-26

## 2024-06-26 RX ORDER — PANTOPRAZOLE SODIUM 40 MG/1
40 TABLET, DELAYED RELEASE ORAL
Status: DISCONTINUED | OUTPATIENT
Start: 2024-06-26 | End: 2024-06-26 | Stop reason: HOSPADM

## 2024-06-26 RX ORDER — HEPARIN 100 UNIT/ML
300 SYRINGE INTRAVENOUS ONCE AS NEEDED
Status: COMPLETED | OUTPATIENT
Start: 2024-06-26 | End: 2024-06-26

## 2024-06-26 RX ORDER — PANTOPRAZOLE SODIUM 40 MG/1
40 TABLET, DELAYED RELEASE ORAL
Qty: 180 TABLET | Refills: 3 | Status: SHIPPED | OUTPATIENT
Start: 2024-06-26 | End: 2025-06-26

## 2024-06-26 RX ADMIN — PANTOPRAZOLE SODIUM 40 MG: 40 INJECTION, POWDER, FOR SOLUTION INTRAVENOUS at 09:06

## 2024-06-26 RX ADMIN — PANTOPRAZOLE SODIUM 40 MG: 40 TABLET, DELAYED RELEASE ORAL at 03:06

## 2024-06-26 RX ADMIN — HEPARIN 300 UNITS: 100 SYRINGE at 03:06

## 2024-06-26 NOTE — NURSING
Pt arrived as a transfer from [facility] to Oncology via bed with two family members at bedside.  Family went home.  Very pleasant patient Aox4, NAD, and denies any needs at this time.  Call light within reach.  COLE RN

## 2024-06-26 NOTE — HPI
Javier Downs is a 74-year-old man with a history of stage IV colorectal cancer s/p extended right hemicolectomy (10/2022), metastasis to the liver who was admitted for acute diverticular bleed after starting chemotherapy. Palliative and Supportive Care was consulted to introduce services and initiate goals of care conversation.

## 2024-06-26 NOTE — PLAN OF CARE
Side rails up x2; call bell in place; bed in lowest, locked position; skid proof socks on; no evidence of skin breakdown; care plan explained to patient; pt remains free of injury. Pt tolerated po, voids, ambulates. Denies pain, n/v or diarrhea. IV d/cd dressing applied, 5 FU completed. Port flushed with 20 cc NS and heparin 300 units instilled, kelley needle d/cd, bandaid applied. Reviewed discharge instructions, appts, medications and prescription to pt and daughter. Frequent rounding on pt, encouraged to call as needed, VSS and afebrile. Escort ordered for transportation.

## 2024-06-26 NOTE — PLAN OF CARE
.Patient Aox4, vitals signs stable, independent of ADL.    Problem: Adult Inpatient Plan of Care  Goal: Plan of Care Review  Outcome: Progressing  Goal: Patient-Specific Goal (Individualized)  Outcome: Progressing  Goal: Absence of Hospital-Acquired Illness or Injury  Outcome: Progressing  Goal: Optimal Comfort and Wellbeing  Outcome: Progressing  Goal: Readiness for Transition of Care  Outcome: Progressing     Problem: Acute Kidney Injury/Impairment  Goal: Fluid and Electrolyte Balance  Outcome: Progressing  Goal: Improved Oral Intake  Outcome: Progressing  Goal: Effective Renal Function  Outcome: Progressing     Problem: Coping Ineffective  Goal: Effective Coping  Outcome: Progressing

## 2024-06-26 NOTE — ASSESSMENT & PLAN NOTE
Javier Downs is a 74 y.o. male with colon cancer s/p extended right hemicolectomy (10/2022, Dr. Bonilla) with metastasis to the liver on chemotherapy and HTN that presents to [facility] with 2 episodes of BRBPR on the am of  (3 AM). GI consulted for assistance with management. At the time of consult, the patient is HDS. Baseline Hgb around 10. Hgb drop to 7.8 on this admission.     Imagin24- CTA- No evidence of luminal extravasation or pooling of contrast to suggest upper or lower GI bleed. Colonic diverticulosis without evidence of acute diverticulitis.Chronic portal vein thrombus.Postsurgical change of right hemicolectomy for history of colon cancer with hepatic metastasis status post multiple radio embolizations.       Prior Endoscopies:   11/3/23- Colonoscopy- - Diverticulosis in the sigmoid colon and in the descending colon. Patent end-to-side ileo-colonic anastomosis, characterized by healthy appearing mucosa and an intact appearance. No specimens collected. The examined portion of the ileum was normal.      Problem List:  Hematochezia   History of Colon Cancer s/p extended right hemicolectomy   Metastatic colon cancer with mets to the liver on chemotherapy   Hyperkalemia   Diverticulosis of the Sigmoid and Descending Colon      Recommendations:  - Okay for diet today  - Given relatively recent colonoscopy and pattern of LGIB; suspect this is diverticular bleeding. Would continue supportive care and resuscitation as needed. Will defer any endoscopies at this time due to no further bleeding and stable blood counts    - Monitor Hgb and transfuse for goal Hgb > 7, unless otherwise indicated  - Maintain IV access with 2 large bore IV's  - Intravascular resuscitation/support with IVF's   - Hold all NSAIDs and anticoagulants, unless contraindicated  - Please correct any coagulopathy with platelets and FFP for goal of platelets >50K and INR <2.0  - Please notify GI team if there is significant change in  patient's clinical status

## 2024-06-26 NOTE — ASSESSMENT & PLAN NOTE
Javier Downs is a 74-year-old man with a history of stage IV colorectal cancer s/p extended right hemicolectomy (10/2022), metastasis to the liver who was admitted for acute diverticular bleed after starting chemotherapy. Palliative and Supportive Care was consulted to introduce services and initiate goals of care conversation.    Advance Care Planning   Goals of Care  - Code status: Full code  - Next of kin: wife Bárbara  - ACP documents: none  - Patient has decision making capacity  - Goals: life prolongation  - Plans: continued outpatient follow up with Dr. Schuster in pursuit of cancer-directed therapies. Amenable with establishing care with outpatient palliative medicine - will make a referral    Goals of Care Conversation:  - 6/26/24: Supportive conversation held with Mr. Downs, who shares that the bloody output has slowed down considerably and he might be able to return home today or tomorrow. He shares that he has colon cancer for which he had part of his colon removed, and knows that the cancer has spread to his liver as well. He is pursuing cancer-directed therapies (chemotherapy) and is grateful for the care that he is receiving from Dr. Schuster, his outpatient oncologist. He used to work in a career involving cement, but since his cancer diagnosis, it's been difficult to work in the sun and no longer works at this time. His wife Bárbara does work, which is why she is not present at this time. He enjoys tending to the marrufo in his garden and doing house projects/work. He has one daughter (Carrie) and two sons (Karl and Alexsander) and three grandchildren. He has attended [employer] ever since he was a young boy, raised by his grandmother and mother to be kind and to attend [employer] regularly ([employer]). He identifies his  and his wife's deacon as part of his support group as well. He denies symptom burdens at this time and was agreeable about establishing care with outpatient palliative care.

## 2024-06-26 NOTE — CONSULTS
[facility] ([facility])  Palliative Medicine  Consult Note    Patient Name: Javier Downs  MRN: [id number]  Admission Date: 6/24/2024  Hospital Length of Stay: 1 days  Code Status: Full Code   Attending Provider: Charley Gambino MD  Consulting Provider: Mylene Werner MD  Primary Care Physician: [facility]  Principal Problem:Lower GI bleed    Patient information was obtained from patient and primary team.      Inpatient consult to Palliative Care  Consult performed by: Mylene Werner MD  Consult ordered by: Max Grijalva MD  Reason for consult: introduction of services, goals of care      Assessment/Plan:     Palliative Care  Encounter for palliative care  Javier Downs is a 74-year-old man with a history of stage IV colorectal cancer s/p extended right hemicolectomy (10/2022), metastasis to the liver who was admitted for acute diverticular bleed after starting chemotherapy. Palliative and Supportive Care was consulted to introduce services and initiate goals of care conversation.    Advance Care Planning  Goals of Care  - Code status: Full code  - Next of kin: wife Bárbara  - ACP documents: none  - Patient has decision making capacity  - Goals: life prolongation  - Plans: continued outpatient follow up with Dr. Schuster in pursuit of cancer-directed therapies. Amenable with establishing care with outpatient palliative medicine - will make a referral    Goals of Care Conversation:  - 6/26/24: Supportive conversation held with Mr. Downs, who shares that the bloody output has slowed down considerably and he might be able to return home today or tomorrow. He shares that he has colon cancer for which he had part of his colon removed, and knows that the cancer has spread to his liver as well. He is pursuing cancer-directed therapies (chemotherapy) and is grateful for the care that he is receiving from Dr. Schuster, his outpatient oncologist. He used to work in a career involving cement, but since his cancer  diagnosis, it's been difficult to work in the sun and no longer works at this time. His wife Bárbara does work, which is why she is not present at this time. He enjoys tending to the marrufo in his garden and doing house projects/work. He has one daughter (Carrie) and two sons (Karl and Alexsander) and three grandchildren. He has attended [employer] ever since he was a young boy, raised by his grandmother and mother to be kind and to attend [employer] regularly ([employer]). He identifies his  and his wife's deacon as part of his support group as well. He denies symptom burdens at this time and was agreeable about establishing care with outpatient palliative care.             Thank you for your consult. I will sign off. Please contact us if you have any additional questions.    Subjective:     HPI:   Javier Downs is a 74-year-old man with a history of stage IV colorectal cancer s/p extended right hemicolectomy (10/2022), metastasis to the liver who was admitted for acute diverticular bleed after starting chemotherapy. Palliative and Supportive Care was consulted to introduce services and initiate goals of care conversation.    Hospital Course:  No notes on file    Interval History: Supportive conversation and introductions held.    Past Medical History:   Diagnosis Date    MARIA A (acute kidney injury) 8/18/2022    Hypertension     Metastatic colon cancer to liver 4/22/2021       Past Surgical History:   Procedure Laterality Date    COLONOSCOPY N/A 4/20/2021    Procedure: COLONOSCOPY with possible stent;  Surgeon: EDILMA Bonilla MD;  Location: [facility] (2ND [facility]);  Service: Colon and Rectal;  Laterality: N/A;    COLONOSCOPY N/A 11/3/2023    Procedure: COLONOSCOPY;  Surgeon: EDILMA Bonilla MD;  Location: [facility] (4TH FLR);  Service: Endoscopy;  Laterality: N/A;  prep instructions sent to pt via portal  From: EDILMA Bonilla MD  Procedure: Colonoscopy  Diagnosis: Surveillance colonoscopy - Hx of colon  cancer  Procedure Timin-12 weeks  Provider: Myself  Location: [facility]  Additional Scheduling Information: No scheduling con    DIAGNOSTIC LAPAROSCOPY N/A 2022    Procedure: LAPAROSCOPY, DIAGNOSTIC;  Surgeon: Adrian Rodriguez MD;  Location: [facility] OR 2ND FLR;  Service: General;  Laterality: N/A;    INSERTION OF TUNNELED CENTRAL VENOUS CATHETER (CVC) WITH SUBCUTANEOUS PORT N/A 5/3/2021    Procedure: [id number];  Surgeon: Francisco Layton MD;  Location: [facility] OR 2ND FLR;  Service: Vascular;  Laterality: N/A;    OMENTECTOMY N/A 10/20/2022    Procedure: OMENTECTOMY;  Surgeon: EDILMA Bonilla MD;  Location: [facility] OR 2ND FLR;  Service: Colon and Rectal;  Laterality: N/A;    RIGHT HEMICOLECTOMY N/A 10/20/2022    Procedure: HEMICOLECTOMY, RIGHT, extended;  Surgeon: EDILMA Bonilla MD;  Location: [facility] OR 2ND FLR;  Service: Colon and Rectal;  Laterality: N/A;  Extended right hemicolectomy CONSENT IN AM       Review of patient's allergies indicates:  No Known Allergies    Medications:  Continuous Infusions:  Scheduled Meds:   pantoprazole  40 mg Oral BID AC     PRN Meds:  Current Facility-Administered Medications:     dextrose 10%, 12.5 g, Intravenous, PRN    dextrose 10%, 25 g, Intravenous, PRN    glucagon (human recombinant), 1 mg, Intramuscular, PRN    glucose, 16 g, Oral, PRN    glucose, 24 g, Oral, PRN    insulin aspart U-100, 0-5 Units, Subcutaneous, Q6H PRN    naloxone, 0.02 mg, Intravenous, PRN    sodium chloride 0.9%, 10 mL, Intravenous, Q12H PRN    Family History       Problem Relation (Age of Onset)    Cancer Brother          Tobacco Use    Smoking status: Never    Smokeless tobacco: Never   Substance and Sexual Activity    Alcohol use: Not Currently    Drug use: Never    Sexual activity: Not Currently     Partners: Female       Review of Systems   Constitutional:  Positive for activity change.     Objective:     Vital Signs (Most Recent):  Temp: 97.8 °F (36.6 °C) (24  1122)  Pulse: 65 (06/26/24 1122)  Resp: 18 (06/26/24 1122)  BP: (!) 104/59 (06/26/24 1122)  SpO2: 100 % (06/26/24 1122) Vital Signs (24h Range):  Temp:  [97.3 °F (36.3 °C)-98.1 °F (36.7 °C)] 97.8 °F (36.6 °C)  Pulse:  [57-85] 65  Resp:  [16-18] 18  SpO2:  [99 %-100 %] 100 %  BP: (104-119)/(55-69) 104/59     Weight: 64 kg (141 lb 1.5 oz)  Body mass index is 22.1 kg/m².       Physical Exam  Constitutional:       General: He is not in acute distress.  HENT:      Head: Normocephalic and atraumatic.      Right Ear: External ear normal.      Left Ear: External ear normal.      Nose: Nose normal.      Mouth/Throat:      Mouth: Mucous membranes are moist.   Eyes:      Extraocular Movements: Extraocular movements intact.      Conjunctiva/sclera: Conjunctivae normal.   Cardiovascular:      Rate and Rhythm: Normal rate.   Pulmonary:      Effort: Pulmonary effort is normal.      Breath sounds: Normal breath sounds.   Abdominal:      General: Bowel sounds are normal.      Palpations: Abdomen is soft.   Musculoskeletal:         General: No swelling.   Skin:     General: Skin is warm and dry.   Neurological:      Mental Status: He is alert and oriented to person, place, and time. Mental status is at baseline.   Psychiatric:         Mood and Affect: Mood normal.         Behavior: Behavior normal.            Review of Symptoms      Symptom Assessment (ESAS 0-10 Scale)  Pain:  0  Dyspnea:  0  Anxiety:  0  Nausea:  0  Depression:  0  Anorexia:  0  Fatigue:  0  Insomnia:  0  Restlessness:  0  Agitation:  0         Living Arrangements:  Lives with spouse    Psychosocial/Cultural:   See Palliative Psychosocial Note: No  , wife Andreia works. Has one daughter (Carrie) and two sons (Karl and Alexsander). 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 Enjoys tending to garden and house during his down time. Has three grandchildren.  **Primary  to Follow**  Palliative Care  Consult:  No    Spiritual:  F - Anusha and Belief:  Shinto - Confucianism  I - Importance:  Very important  C - Community:  Attends [facility] [facility] - identifies his  and wife's deacon as part of his support system  A - Address in Care:  Offer  support        Advance Care Planning  Advance Directives:   Living Will: No    LaPOST: No    Do Not Resuscitate Status: No    Medical Power of : No      Decision Making:  Patient answered questions  Goals of Care: Supportive conversation held with Mr. Downs, who shares that the bloody output has slowed down considerably and he might be able to return home today or tomorrow. He shares that he has colon cancer for which he had part of his colon removed, and knows that the cancer has spread to his liver as well. He is pursuing cancer-directed therapies (chemotherapy) and is grateful for the care that he is receiving from Dr. Schuster, his outpatient oncologist. He used to work in a career involving cement, but since his cancer diagnosis, it's been difficult to work in the sun and no longer works at this time. His wife Bárbara does work, which is why she is not present at this time. He enjoys tending to the marrufo in his garden and doing house projects/work. He has one daughter (Carrie) and two sons (Karl and Alexsander) and three grandchildren. He has attended Christianity ever since he was a young boy, raised by his grandmother and mother to be kind and to attend [employer] regularly ([employer]). He identifies his  and his wife's deacon as part of his support group as well. He denies symptom burdens at this time and was agreeable about establishing care with outpatient palliative care.         Significant Labs: CBC:   Recent Labs   Lab 06/25/24  1529 06/25/24  2348 06/26/24  0848   WBC 5.60 4.53 3.87*   HGB 7.9* 7.3* 7.2*   HCT 26.7* 25.1* 24.3*    153 152     CMP:   Recent Labs   Lab 06/25/24  0011 06/25/24  0448 06/26/24  0326    142 141   K 5.8* 5.0 4.8    * 113* 114*   CO2 19* 21* 23   * 138* 106   BUN 22 21 15   CREATININE 1.1 0.9 0.9   CALCIUM 8.3* 8.7 8.4*   PROT 6.0 5.9* 5.5*   ALBUMIN 2.8* 2.8* 2.8*   BILITOT 1.3* 1.2* 1.5*   ALKPHOS 121 125 101   AST 63* 46* 43*   ALT 35 32 34   ANIONGAP 8 8 4*     CBC:   Recent Labs   Lab 06/26/24  0848   WBC 3.87*   HGB 7.2*   HCT 24.3*   MCV 83        BMP:  Recent Labs   Lab 06/26/24  0326         K 4.8   *   CO2 23   BUN 15   CREATININE 0.9   CALCIUM 8.4*   MG 2.3     LFT:  Lab Results   Component Value Date    AST 43 (H) 06/26/2024    ALKPHOS 101 06/26/2024    BILITOT 1.5 (H) 06/26/2024     Albumin:   Albumin   Date Value Ref Range Status   06/26/2024 2.8 (L) 3.5 - 5.2 g/dL Final     Protein:   Total Protein   Date Value Ref Range Status   06/26/2024 5.5 (L) 6.0 - 8.4 g/dL Final     Lactic acid:   Lab Results   Component Value Date    LACTATE 2.2 09/15/2022       Significant Imaging: I have reviewed all pertinent imaging results/findings within the past 24 hours.      I spent a total of 75 minutes on the day of the visit. This includes face to face time in discussion of goals of care, symptom assessment, coordination of care and emotional support.  This also includes non-face to face time preparing to see the patient (eg, review of tests/imaging), obtaining and/or reviewing separately obtained history, documenting clinical information in the electronic or other health record, independently interpreting results and communicating results to the patient/family/caregiver, or care coordinator.    Mylene Werner MD  Palliative Medicine  [facility] - Oncology ([facility])

## 2024-06-26 NOTE — HOSPITAL COURSE
Presented with lower GI bleeding.  GI was consulted felt likely due to diverticular bleed.  Colonoscopy was done in November which showed no abnormality.  Hemoglobin was monitored without worsening noted.  Advised to stop taking Advil.  Stable to be discharged home

## 2024-06-26 NOTE — MEDICAL/APP STUDENT
"Hospital Medicine Student   Progress Note  [facility]    Admit Date: 6/24/2024  Hospital Day: 1  06/26/2024  10:53 AM    SUBJECTIVE:   Mr. Javier Downs is a 74 y.o. male with a relevant medical history of CRC w/ mets to the liver, HTN who is being followed up for Lower GI bleed.    Interval history: Pt reports feeling well today, was moved from [facility] to 8th floor w/o issue. Reports sleeping well overnight, last bloody BM was yesterday around 3pm and per patient was "very lightly bloody". No BM as of 0830 this morning. Pt denies any new complaints, will complete 26 of cycle of 5-FU around 1100 today.       Review of Systems   Constitutional:  Negative for chills and fever.   HENT:  Negative for sore throat.    Eyes:  Negative for double vision.   Respiratory:  Negative for cough, hemoptysis and shortness of breath.    Cardiovascular:  Negative for chest pain and palpitations.   Gastrointestinal:  Positive for blood in stool. Negative for constipation, diarrhea, nausea and vomiting.   Genitourinary:  Negative for dysuria.   Musculoskeletal:  Negative for myalgias.   Skin:  Negative for rash.   Neurological:  Negative for headaches.   Psychiatric/Behavioral:  The patient is not nervous/anxious.        Please refer to the H&P for past medical, family, and social history.    OBJECTIVE:     Vital Signs Recent:  Temp: 97.3 °F (36.3 °C) (06/26/24 0825)  Pulse: 74 (06/26/24 0825)  Resp: 16 (06/26/24 0825)  BP: 119/63 (06/26/24 0825)  SpO2: 100 % (06/26/24 0825)  Oxygen Documentation:                Device (Oxygen Therapy): room air         Vital Signs Range (Last 24H):  Temp:  [97.3 °F (36.3 °C)-98.1 °F (36.7 °C)]   Pulse:  [57-85]   Resp:  [16-18]   BP: (105-119)/(55-69)   SpO2:  [99 %-100 %]        I & O (Last 24H):  Intake/Output Summary (Last 24 hours) at 6/26/2024 1053  Last data filed at 6/26/2024 0000  Gross per 24 hour   Intake 0 ml   Output --   Net 0 ml        Physical Exam:  Physical " Exam  Constitutional:       General: He is not in acute distress.  HENT:      Head: Normocephalic.      Nose: Nose normal.      Mouth/Throat:      Mouth: Mucous membranes are moist.   Cardiovascular:      Rate and Rhythm: Normal rate and regular rhythm.      Pulses: Normal pulses.      Heart sounds: Normal heart sounds. No murmur heard.  Pulmonary:      Effort: Pulmonary effort is normal. No respiratory distress.      Breath sounds: Normal breath sounds.   Abdominal:      General: Abdomen is flat. Bowel sounds are normal. There is no distension.      Tenderness: There is no abdominal tenderness. There is no rebound.   Musculoskeletal:         General: Normal range of motion.      Cervical back: Normal range of motion.   Skin:     General: Skin is warm and dry.      Capillary Refill: Capillary refill takes less than 2 seconds.      Coloration: Skin is not jaundiced.   Neurological:      General: No focal deficit present.      Mental Status: He is alert and oriented to person, place, and time.   Psychiatric:         Mood and Affect: Mood normal.         Behavior: Behavior normal.       Labs:   Recent Labs   Lab 06/25/24  0011 06/25/24  0448 06/26/24  0326    142 141   K 5.8* 5.0 4.8   * 113* 114*   CO2 19* 21* 23   BUN 22 21 15   CREATININE 1.1 0.9 0.9   * 138* 106   CALCIUM 8.3* 8.7 8.4*   MG  --  2.3 2.3   PHOS  --  2.2* 3.2     Recent Labs   Lab 06/25/24  0011 06/25/24  0448 06/26/24  0326   ALKPHOS 121 125 101   ALT 35 32 34   AST 63* 46* 43*   ALBUMIN 2.8* 2.8* 2.8*   PROT 6.0 5.9* 5.5*   BILITOT 1.3* 1.2* 1.5*   INR  --  1.0  --      Recent Labs   Lab 06/25/24  1529 06/25/24  2348 06/26/24  0848   WBC 5.60 4.53 3.87*   HGB 7.9* 7.3* 7.2*   HCT 26.7* 25.1* 24.3*    153 152       Diagnostic Results:    Scheduled Meds:   pantoprazole  40 mg Oral BID AC     Continuous Infusions:  PRN Meds:  Current Facility-Administered Medications:     dextrose 10%, 12.5 g, Intravenous, PRN    dextrose  10%, 25 g, Intravenous, PRN    glucagon (human recombinant), 1 mg, Intramuscular, PRN    glucose, 16 g, Oral, PRN    glucose, 24 g, Oral, PRN    insulin aspart U-100, 0-5 Units, Subcutaneous, Q6H PRN    naloxone, 0.02 mg, Intravenous, PRN    sodium chloride 0.9%, 10 mL, Intravenous, Q12H PRN    ASSESSMENT/PLAN:   Mr. Javier Downs is a 74 y.o. male with a relevant medical history of CRC w/ mets to the liver who is being followed up for Lower GI bleed. PT is HDS, Hgb drop from 10 to 7.2.     Active Hospital Problems    Diagnosis  POA    *Lower GI bleed [K92.2]  Yes    Hyperkalemia [E87.5]  Yes    Metastatic colon cancer to liver [C18.9, C78.7]  Yes    Hypertension [I10]  Yes    Microcytic anemia [D50.9]  Yes      Resolved Hospital Problems   No resolved problems to display.         GI bleed  -Pt admitted for acute GI bleed, w/ notable Hgb drop  -trended CBC, Hgb 7.2 today  -Pt denies bleeding today, okay to eat diet orders placed  -Pantoprazole  -Pt to be d/c'd, given strict return precautions should significant bleeding occur    Hyperkalemia  -K+ down today to 4.8    Microcytic anemia  -Hx of microcytic anemia in setting of possible GI bleed  - CBC q8H to trend  - New iron panel given hx    CRC w/ liver mets  -Pt completed cycle 26 FOLFIRI + Beva       HTN  -BP stable throughout admission  -On home amlodipine, held on admission        Discharge planning:   Pt okay to be d/c'd, Hgb stable at 7.2, no bleeding events today. Pt given strict return precautions should significant bleeding resume.

## 2024-06-26 NOTE — HPI
Mr. Martinez is a 74 year old male with hx of colorectal cx with mets to the liver on active IV with [facility], and HTN here with 2 episodes of BRBPR that occurred the previous evening. He states this is brand new problem for him and he has never experienced this before. It is not associted with painful or other symptoms. He is not on blood thinners, he does not take NSAIDs. Symptoms have not reoccurred per patient since arriving to ED but is unsure. Denies any changes in diet. Only medication CCB for blood pressure.         In the ED, he was found to be HDS with a 2 point drop in Hgb from yesterday's labs. CTA negative. Hyperkalemic at 5.8. Patient admitted to  for concerns of lower GI bleed in setting of colon cx hx on active chemo.

## 2024-06-26 NOTE — PROGRESS NOTES
[facility] - Oncology ([facility])  Gastroenterology  Progress Note    Patient Name: Javier Downs  MRN: [id number]  Admission Date: 6/24/2024  Hospital Length of Stay: 1 days  Code Status: Full Code   Attending Provider: Charley Gambino MD  Consulting Provider: INGA Quezada  Primary Care Physician: Rosaura Ayoub  Principal Problem: Lower GI bleed  Date of service:  6/26/2024    Subjective:     Interval History: NAEON. VSS. Denies any further bleeding episodes. Last BM yesterday contained bright red blood. Hemoglobin down slightly. No complaints or concerns.     Review of Systems   Constitutional:  Negative for activity change, appetite change, chills, fatigue and fever.   Respiratory:  Negative for shortness of breath.    Cardiovascular:  Negative for chest pain.   Gastrointestinal:  Negative for abdominal distention, abdominal pain, anal bleeding, blood in stool, nausea, rectal pain and vomiting.   Genitourinary: Negative.    Neurological:  Negative for dizziness and weakness.   All other systems reviewed and are negative.    Objective:     Vital Signs (Most Recent):  Temp: 97.6 °F (36.4 °C) (06/26/24 0459)  Pulse: 63 (06/26/24 0600)  Resp: 18 (06/26/24 0459)  BP: 114/69 (06/26/24 0459)  SpO2: 100 % (06/26/24 0459) Vital Signs (24h Range):  Temp:  [97.4 °F (36.3 °C)-98.1 °F (36.7 °C)] 97.6 °F (36.4 °C)  Pulse:  [57-85] 63  Resp:  [16-18] 18  SpO2:  [99 %-100 %] 100 %  BP: (105-114)/(55-69) 114/69     Weight: 64 kg (141 lb 1.5 oz) (06/25/24 0403)  Body mass index is 22.1 kg/m².      Intake/Output Summary (Last 24 hours) at 6/26/2024 0938  Last data filed at 6/26/2024 0000  Gross per 24 hour   Intake 0 ml   Output --   Net 0 ml       Lines/Drains/Airways       Central Venous Catheter Line  Duration                  PowerPort A Cath Single Lumen 05/03/21 0829 right internal jugular 1150 days              Peripheral Intravenous Line  Duration                  Peripheral IV - Single Lumen 06/25/24 0010 20 G Left  Antecubital 1 day                     Physical Exam  Vitals and nursing note reviewed.   Constitutional:       General: He is awake. He is not in acute distress.     Appearance: Normal appearance. He is well-developed and normal weight. He is not ill-appearing.   HENT:      Head: Normocephalic and atraumatic.      Right Ear: External ear normal.      Left Ear: External ear normal.      Nose: Nose normal.   Eyes:      General: No scleral icterus.     Extraocular Movements: Extraocular movements intact.   Cardiovascular:      Rate and Rhythm: Normal rate and regular rhythm.      Heart sounds: Normal heart sounds. No murmur heard.     No gallop.   Pulmonary:      Effort: Pulmonary effort is normal. No respiratory distress.   Abdominal:      General: There is no distension.      Tenderness: There is no guarding.   Musculoskeletal:         General: Normal range of motion.      Cervical back: Normal range of motion and neck supple.      Left lower leg: No edema.   Skin:     General: Skin is warm and dry.   Neurological:      Mental Status: He is alert and oriented to person, place, and time.      Motor: No weakness.   Psychiatric:         Mood and Affect: Mood normal.         Behavior: Behavior normal. Behavior is cooperative.         Thought Content: Thought content normal.          Significant Labs:  CBC:   Recent Labs   Lab 06/25/24  1529 06/25/24  2348 06/26/24  0848   WBC 5.60 4.53 3.87*   HGB 7.9* 7.3* 7.2*   HCT 26.7* 25.1* 24.3*    153 152     BMP:   Recent Labs   Lab 06/26/24  0326         K 4.8   *   CO2 23   BUN 15   CREATININE 0.9   CALCIUM 8.4*   MG 2.3     CMP:   Recent Labs   Lab 06/26/24  0326      CALCIUM 8.4*   ALBUMIN 2.8*   PROT 5.5*      K 4.8   CO2 23   *   BUN 15   CREATININE 0.9   ALKPHOS 101   ALT 34   AST 43*   BILITOT 1.5*     Coagulation:   Recent Labs   Lab 06/25/24  0448   INR 1.0   APTT 24.1     Liver Function Test:   Recent Labs   Lab  24  0011 24  0448 24  0326   ALT 35 32 34   AST 63* 46* 43*   ALKPHOS 121 125 101   BILITOT 1.3* 1.2* 1.5*   PROT 6.0 5.9* 5.5*   ALBUMIN 2.8* 2.8* 2.8*     All pertinent lab results from the last 24 hours have been reviewed.      Significant Imaging:  Imaging results within the past 24 hours have been reviewed.  Assessment/Plan:     GI  * Lower GI bleed  Javier Downs is a 74 y.o. male with colon cancer s/p extended right hemicolectomy (10/2022, Dr. Bonilla) with metastasis to the liver on chemotherapy and HTN that presents to [facility] with 2 episodes of BRBPR on the am of  (3 AM). GI consulted for assistance with management. At the time of consult, the patient is HDS. Baseline Hgb around 10. Hgb drop to 7.8 on this admission.     Imagin24- CTA- No evidence of luminal extravasation or pooling of contrast to suggest upper or lower GI bleed. Colonic diverticulosis without evidence of acute diverticulitis.Chronic portal vein thrombus.Postsurgical change of right hemicolectomy for history of colon cancer with hepatic metastasis status post multiple radio embolizations.       Prior Endoscopies:   11/3/23- Colonoscopy- - Diverticulosis in the sigmoid colon and in the descending colon. Patent end-to-side ileo-colonic anastomosis, characterized by healthy appearing mucosa and an intact appearance. No specimens collected. The examined portion of the ileum was normal.      Problem List:  Hematochezia   History of Colon Cancer s/p extended right hemicolectomy   Metastatic colon cancer with mets to the liver on chemotherapy   Hyperkalemia   Diverticulosis of the Sigmoid and Descending Colon      Recommendations:  - Okay for diet today  - Given relatively recent colonoscopy and pattern of LGIB; suspect this is diverticular bleeding. Would continue supportive care and resuscitation as needed. Will defer any endoscopies at this time due to no further bleeding and stable blood counts    - Monitor Hgb and  transfuse for goal Hgb > 7, unless otherwise indicated  - Maintain IV access with 2 large bore IV's  - Intravascular resuscitation/support with IVF's   - Hold all NSAIDs and anticoagulants, unless contraindicated  - Please correct any coagulopathy with platelets and FFP for goal of platelets >50K and INR <2.0  - Please notify GI team if there is significant change in patient's clinical status         After careful discussion with Dr. Devlin, it has been decided for Gastroenterology to sign off on this patient.   Thank you for your consult. I will sign off. Please contact us if you have any additional questions.    Rachele Mesa, GINIP-C  Gastroenterology  [facility] - Oncology ([facility])

## 2024-06-26 NOTE — DISCHARGE SUMMARY
[facility] - Oncology ([facility])  Hematology/Oncology  Discharge Summary      Patient Name: Javier Downs  MRN: [id number]  Admission Date: 6/24/2024  Hospital Length of Stay: 1 days  Discharge Date and Time: No discharge date for patient encounter.  Attending Physician: Charley Gambino MD   Discharging Provider: Haydee Lindsey MD  Primary Care Provider: [facility]    HPI: Mr. Martinez is a 74 year old male with hx of colorectal cx with mets to the liver on active IV with [facility], and HTN here with 2 episodes of BRBPR that occurred the previous evening. He states this is brand new problem for him and he has never experienced this before. It is not associted with painful or other symptoms. He is not on blood thinners, he does not take NSAIDs. Symptoms have not reoccurred per patient since arriving to ED but is unsure. Denies any changes in diet. Only medication CCB for blood pressure.         In the ED, he was found to be HDS with a 2 point drop in Hgb from yesterday's labs. CTA negative. Hyperkalemic at 5.8. Patient admitted to  for concerns of lower GI bleed in setting of colon cx hx on active chemo.     * No surgery found *     Hospital Course: Presented with lower GI bleeding.  GI was consulted felt likely due to diverticular bleed.  Colonoscopy was done in November which showed no abnormality.  Hemoglobin was monitored without worsening noted.  Advised to stop taking Advil.  Stable to be discharged home    Goals of Care Treatment Preferences:  Code Status: Full Code      Consults:   Consults (From admission, onward)          Status Ordering Provider     Inpatient consult to Palliative Care  Once        Provider:  (Not yet assigned)    Completed HAYDEE LINDSEY     Inpatient consult to Gastroenterology  Once        Provider:  (Not yet assigned)    Completed ORIN NANCE            Significant Diagnostic Studies: Labs: CMP   Recent Labs   Lab 06/25/24  0011 06/25/24  0448 06/26/24  2186     142 141   K 5.8* 5.0 4.8   * 113* 114*   CO2 19* 21* 23   * 138* 106   BUN 22 21 15   CREATININE 1.1 0.9 0.9   CALCIUM 8.3* 8.7 8.4*   PROT 6.0 5.9* 5.5*   ALBUMIN 2.8* 2.8* 2.8*   BILITOT 1.3* 1.2* 1.5*   ALKPHOS 121 125 101   AST 63* 46* 43*   ALT 35 32 34   ANIONGAP 8 8 4*   , CBC   Recent Labs   Lab 06/25/24  1529 06/25/24  2348 06/26/24  0848   WBC 5.60 4.53 3.87*   HGB 7.9* 7.3* 7.2*   HCT 26.7* 25.1* 24.3*    153 152   , and All labs within the past 24 hours have been reviewed    Pending Diagnostic Studies:       None          Final Active Diagnoses:    Diagnosis Date Noted POA    PRINCIPAL PROBLEM:  Lower GI bleed [K92.2] 06/25/2024 Yes    Encounter for palliative care [Z51.5] 06/26/2024 Not Applicable    Hyperkalemia [E87.5] 06/25/2024 Yes    Metastatic colon cancer to liver [C18.9, C78.7] 04/22/2021 Yes    Hypertension [I10]  Yes    Microcytic anemia [D50.9]  Yes      Problems Resolved During this Admission:      Discharged Condition: stable    Disposition: Home or Self Care    Follow Up:    Patient Instructions:      Notify your health care provider if you experience any of the following:  temperature >100.4     Notify your health care provider if you experience any of the following:  persistent nausea and vomiting or diarrhea     Notify your health care provider if you experience any of the following:  severe uncontrolled pain     Notify your health care provider if you experience any of the following:  redness, tenderness, or signs of infection (pain, swelling, redness, odor or green/yellow discharge around incision site)     Notify your health care provider if you experience any of the following:  difficulty breathing or increased cough     Notify your health care provider if you experience any of the following:  severe persistent headache     Notify your health care provider if you experience any of the following:  worsening rash     Notify your health care provider if you  experience any of the following:  persistent dizziness, light-headedness, or visual disturbances     Notify your health care provider if you experience any of the following:  increased confusion or weakness     Notify your health care provider if you experience any of the following:   Order Comments: Bleeding per rectum     Medications:  Reconciled Home Medications:      Medication List        START taking these medications      pantoprazole 40 MG tablet  Commonly known as: PROTONIX  Take 1 tablet (40 mg total) by mouth 2 (two) times daily before meals.            CONTINUE taking these medications      acetaminophen 500 MG tablet  Commonly known as: TYLENOL  Take 1 tablet (500 mg total) by mouth every 6 (six) hours as needed for Pain (alternate with ibuprofen).     amLODIPine 10 MG tablet  Commonly known as: NORVASC  Take 1 tablet (10 mg total) by mouth once daily.     LIDOcaine-prilocaine cream  Commonly known as: EMLA  Apply topically as needed (port application).     ondansetron 4 MG tablet  Commonly known as: ZOFRAN  Take 1 tablet (4 mg total) by mouth every 8 (eight) hours as needed for Nausea.     oxyCODONE 5 MG immediate release tablet  Commonly known as: ROXICODONE  Take 1 tablet (5 mg total) by mouth every 6 (six) hours as needed for Pain.     tamsulosin 0.4 mg Cap  Commonly known as: FLOMAX  Take 1 capsule (0.4 mg total) by mouth once daily.            STOP taking these medications      ibuprofen 400 MG tablet  Commonly known as: MARGOT FAULKNER MD  Hematology/Oncology  [facility] - Oncology ([facility])

## 2024-06-26 NOTE — SUBJECTIVE & OBJECTIVE
Subjective:     Interval History: NAEON. VSS. Denies any further bleeding episodes. Last BM yesterday contained bright red blood. Hemoglobin down slightly. No complaints or concerns.     Review of Systems   Constitutional:  Negative for activity change, appetite change, chills, fatigue and fever.   Respiratory:  Negative for shortness of breath.    Cardiovascular:  Negative for chest pain.   Gastrointestinal:  Negative for abdominal distention, abdominal pain, anal bleeding, blood in stool, nausea, rectal pain and vomiting.   Genitourinary: Negative.    Neurological:  Negative for dizziness and weakness.   All other systems reviewed and are negative.    Objective:     Vital Signs (Most Recent):  Temp: 97.6 °F (36.4 °C) (06/26/24 0459)  Pulse: 63 (06/26/24 0600)  Resp: 18 (06/26/24 0459)  BP: 114/69 (06/26/24 0459)  SpO2: 100 % (06/26/24 0459) Vital Signs (24h Range):  Temp:  [97.4 °F (36.3 °C)-98.1 °F (36.7 °C)] 97.6 °F (36.4 °C)  Pulse:  [57-85] 63  Resp:  [16-18] 18  SpO2:  [99 %-100 %] 100 %  BP: (105-114)/(55-69) 114/69     Weight: 64 kg (141 lb 1.5 oz) (06/25/24 0403)  Body mass index is 22.1 kg/m².      Intake/Output Summary (Last 24 hours) at 6/26/2024 0938  Last data filed at 6/26/2024 0000  Gross per 24 hour   Intake 0 ml   Output --   Net 0 ml       Lines/Drains/Airways       Central Venous Catheter Line  Duration                  PowerPort A Cath Single Lumen 05/03/21 0829 right internal jugular 1150 days              Peripheral Intravenous Line  Duration                  Peripheral IV - Single Lumen 06/25/24 0010 20 G Left Antecubital 1 day                     Physical Exam  Vitals and nursing note reviewed.   Constitutional:       General: He is awake. He is not in acute distress.     Appearance: Normal appearance. He is well-developed and normal weight. He is not ill-appearing.   HENT:      Head: Normocephalic and atraumatic.      Right Ear: External ear normal.      Left Ear: External ear normal.       Nose: Nose normal.   Eyes:      General: No scleral icterus.     Extraocular Movements: Extraocular movements intact.   Cardiovascular:      Rate and Rhythm: Normal rate and regular rhythm.      Heart sounds: Normal heart sounds. No murmur heard.     No gallop.   Pulmonary:      Effort: Pulmonary effort is normal. No respiratory distress.   Abdominal:      General: There is no distension.      Tenderness: There is no guarding.   Musculoskeletal:         General: Normal range of motion.      Cervical back: Normal range of motion and neck supple.      Left lower leg: No edema.   Skin:     General: Skin is warm and dry.   Neurological:      Mental Status: He is alert and oriented to person, place, and time.      Motor: No weakness.   Psychiatric:         Mood and Affect: Mood normal.         Behavior: Behavior normal. Behavior is cooperative.         Thought Content: Thought content normal.          Significant Labs:  CBC:   Recent Labs   Lab 06/25/24  1529 06/25/24  2348 06/26/24  0848   WBC 5.60 4.53 3.87*   HGB 7.9* 7.3* 7.2*   HCT 26.7* 25.1* 24.3*    153 152     BMP:   Recent Labs   Lab 06/26/24  0326         K 4.8   *   CO2 23   BUN 15   CREATININE 0.9   CALCIUM 8.4*   MG 2.3     CMP:   Recent Labs   Lab 06/26/24  0326      CALCIUM 8.4*   ALBUMIN 2.8*   PROT 5.5*      K 4.8   CO2 23   *   BUN 15   CREATININE 0.9   ALKPHOS 101   ALT 34   AST 43*   BILITOT 1.5*     Coagulation:   Recent Labs   Lab 06/25/24  0448   INR 1.0   APTT 24.1     Liver Function Test:   Recent Labs   Lab 06/25/24  0011 06/25/24  0448 06/26/24  0326   ALT 35 32 34   AST 63* 46* 43*   ALKPHOS 121 125 101   BILITOT 1.3* 1.2* 1.5*   PROT 6.0 5.9* 5.5*   ALBUMIN 2.8* 2.8* 2.8*     All pertinent lab results from the last 24 hours have been reviewed.      Significant Imaging:  Imaging results within the past 24 hours have been reviewed.

## 2024-06-26 NOTE — PROGRESS NOTES
Report given to receiving nurse - Oncology . At time of transport, patient awake, alert and oriented. Family members present. All patient's belongs retrieved by family members.

## 2024-06-26 NOTE — SUBJECTIVE & OBJECTIVE
Interval History: Supportive conversation and introductions held.    Past Medical History:   Diagnosis Date    MARIA A (acute kidney injury) 2022    Hypertension     Metastatic colon cancer to liver 2021       Past Surgical History:   Procedure Laterality Date    COLONOSCOPY N/A 2021    Procedure: COLONOSCOPY with possible stent;  Surgeon: EDILMA Bonilla MD;  Location: [facility] (2ND FLR);  Service: Colon and Rectal;  Laterality: N/A;    COLONOSCOPY N/A 11/3/2023    Procedure: COLONOSCOPY;  Surgeon: EDILMA Bonilla MD;  Location: [facility] (4TH FLR);  Service: Endoscopy;  Laterality: N/A;  prep instructions sent to pt via portal  From: EDILMA Bonilla MD  Procedure: Colonoscopy  Diagnosis: Surveillance colonoscopy - Hx of colon cancer  Procedure Timin-12 weeks  Provider: Myself  Location: [address]  Additional Scheduling Information: No scheduling con    DIAGNOSTIC LAPAROSCOPY N/A 2022    Procedure: LAPAROSCOPY, DIAGNOSTIC;  Surgeon: Adrian Rodriguez MD;  [address];  Service: General;  Laterality: N/A;    INSERTION OF TUNNELED CENTRAL VENOUS CATHETER (CVC) WITH SUBCUTANEOUS PORT N/A 5/3/2021    Procedure: [id number];  Surgeon: Francisco Layton MD;  [facility];  Service: Vascular;  Laterality: N/A;    OMENTECTOMY N/A 10/20/2022    Procedure: OMENTECTOMY;  Surgeon: EDILMA Bonilla MD;  [facility];  Service: Colon and Rectal;  Laterality: N/A;    RIGHT HEMICOLECTOMY N/A 10/20/2022    Procedure: HEMICOLECTOMY, RIGHT, extended;  Surgeon: EDILMA Bonilla MD;  [facility];  Service: Colon and Rectal;  Laterality: N/A;  Extended right hemicolectomy CONSENT IN AM       Review of patient's allergies indicates:  No Known Allergies    Medications:  Continuous Infusions:  Scheduled Meds:   pantoprazole  40 mg Oral BID AC     PRN Meds:  Current Facility-Administered Medications:     dextrose 10%, 12.5 g, Intravenous, PRN    dextrose  10%, 25 g, Intravenous, PRN    glucagon (human recombinant), 1 mg, Intramuscular, PRN    glucose, 16 g, Oral, PRN    glucose, 24 g, Oral, PRN    insulin aspart U-100, 0-5 Units, Subcutaneous, Q6H PRN    naloxone, 0.02 mg, Intravenous, PRN    sodium chloride 0.9%, 10 mL, Intravenous, Q12H PRN    Family History       Problem Relation (Age of Onset)    Cancer Brother          Tobacco Use    Smoking status: Never    Smokeless tobacco: Never   Substance and Sexual Activity    Alcohol use: Not Currently    Drug use: Never    Sexual activity: Not Currently     Partners: Female       Review of Systems   Constitutional:  Positive for activity change.     Objective:     Vital Signs (Most Recent):  Temp: 97.8 °F (36.6 °C) (06/26/24 1122)  Pulse: 65 (06/26/24 1122)  Resp: 18 (06/26/24 1122)  BP: (!) 104/59 (06/26/24 1122)  SpO2: 100 % (06/26/24 1122) Vital Signs (24h Range):  Temp:  [97.3 °F (36.3 °C)-98.1 °F (36.7 °C)] 97.8 °F (36.6 °C)  Pulse:  [57-85] 65  Resp:  [16-18] 18  SpO2:  [99 %-100 %] 100 %  BP: (104-119)/(55-69) 104/59     Weight: 64 kg (141 lb 1.5 oz)  Body mass index is 22.1 kg/m².       Physical Exam  Constitutional:       General: He is not in acute distress.  HENT:      Head: Normocephalic and atraumatic.      Right Ear: External ear normal.      Left Ear: External ear normal.      Nose: Nose normal.      Mouth/Throat:      Mouth: Mucous membranes are moist.   Eyes:      Extraocular Movements: Extraocular movements intact.      Conjunctiva/sclera: Conjunctivae normal.   Cardiovascular:      Rate and Rhythm: Normal rate.   Pulmonary:      Effort: Pulmonary effort is normal.      Breath sounds: Normal breath sounds.   Abdominal:      General: Bowel sounds are normal.      Palpations: Abdomen is soft.   Musculoskeletal:         General: No swelling.   Skin:     General: Skin is warm and dry.   Neurological:      Mental Status: He is alert and oriented to person, place, and time. Mental status is at baseline.    Psychiatric:         Mood and Affect: Mood normal.         Behavior: Behavior normal.            Review of Symptoms      Symptom Assessment (ESAS 0-10 Scale)  Pain:  0  Dyspnea:  0  Anxiety:  0  Nausea:  0  Depression:  0  Anorexia:  0  Fatigue:  0  Insomnia:  0  Restlessness:  0  Agitation:  0         Living Arrangements:  Lives with spouse    Psychosocial/Cultural:   See Palliative Psychosocial Note: No  , wife Andreia works. Has one daughter (Carrie) and two sons (Karl and Alexsander). 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 Enjoys tending to garden and house during his down time. Has three grandchildren.  **Primary  to Follow**  Palliative Care  Consult: No    Spiritual:  F - Anusha and Belief:  Lutheran - Episcopal  I - Importance:  Very important  C - Community:  Attends [employer] - identifies his  and wife's deacon as part of his support system  A - Address in Care:  Offer  support        Advance Care Planning   Advance Directives:   Living Will: No    LaPOST: No    Do Not Resuscitate Status: No    Medical Power of : No      Decision Making:  Patient answered questions  Goals of Care: Supportive conversation held with Mr. Downs, who shares that the bloody output has slowed down considerably and he might be able to return home today or tomorrow. He shares that he has colon cancer for which he had part of his colon removed, and knows that the cancer has spread to his liver as well. He is pursuing cancer-directed therapies (chemotherapy) and is grateful for the care that he is receiving from Dr. Schuster, his outpatient oncologist. He used to work in a career involving cement, but since his cancer diagnosis, it's been difficult to work in the sun and no longer works at this time. His wife Bárbara does work, which is why she is not present at this time. He enjoys tending to the marrufo in his garden and doing house  projects/work. He has one daughter (Carrie) and two sons (Karl and Alexsander) and three grandchildren. He has attended [employer] ever since he was a young boy, raised by his grandmother and mother to be kind and to attend [employer] regularly ([employer]). He identifies his  and his wife's deacon as part of his support group as well. He denies symptom burdens at this time and was agreeable about establishing care with outpatient palliative care.         Significant Labs: CBC:   Recent Labs   Lab 06/25/24  1529 06/25/24  2348 06/26/24  0848   WBC 5.60 4.53 3.87*   HGB 7.9* 7.3* 7.2*   HCT 26.7* 25.1* 24.3*    153 152     CMP:   Recent Labs   Lab 06/25/24  0011 06/25/24  0448 06/26/24  0326    142 141   K 5.8* 5.0 4.8   * 113* 114*   CO2 19* 21* 23   * 138* 106   BUN 22 21 15   CREATININE 1.1 0.9 0.9   CALCIUM 8.3* 8.7 8.4*   PROT 6.0 5.9* 5.5*   ALBUMIN 2.8* 2.8* 2.8*   BILITOT 1.3* 1.2* 1.5*   ALKPHOS 121 125 101   AST 63* 46* 43*   ALT 35 32 34   ANIONGAP 8 8 4*     CBC:   Recent Labs   Lab 06/26/24  0848   WBC 3.87*   HGB 7.2*   HCT 24.3*   MCV 83        BMP:  Recent Labs   Lab 06/26/24  0326         K 4.8   *   CO2 23   BUN 15   CREATININE 0.9   CALCIUM 8.4*   MG 2.3     LFT:  Lab Results   Component Value Date    AST 43 (H) 06/26/2024    ALKPHOS 101 06/26/2024    BILITOT 1.5 (H) 06/26/2024     Albumin:   Albumin   Date Value Ref Range Status   06/26/2024 2.8 (L) 3.5 - 5.2 g/dL Final     Protein:   Total Protein   Date Value Ref Range Status   06/26/2024 5.5 (L) 6.0 - 8.4 g/dL Final     Lactic acid:   Lab Results   Component Value Date    LACTATE 2.2 09/15/2022       Significant Imaging: I have reviewed all pertinent imaging results/findings within the past 24 hours.

## 2024-07-08 ENCOUNTER — LAB VISIT (OUTPATIENT)
Dept: LAB | Facility: HOSPITAL | Age: 74
End: 2024-07-08
Payer: MEDICARE

## 2024-07-08 ENCOUNTER — INFUSION (OUTPATIENT)
Dept: INFUSION THERAPY | Facility: HOSPITAL | Age: 74
End: 2024-07-08
Payer: MEDICARE

## 2024-07-08 ENCOUNTER — OFFICE VISIT (OUTPATIENT)
Dept: HEMATOLOGY/ONCOLOGY | Facility: CLINIC | Age: 74
End: 2024-07-08
Payer: MEDICARE

## 2024-07-08 VITALS
HEIGHT: 67 IN | HEART RATE: 84 BPM | BODY MASS INDEX: 21.21 KG/M2 | OXYGEN SATURATION: 100 % | SYSTOLIC BLOOD PRESSURE: 102 MMHG | RESPIRATION RATE: 18 BRPM | TEMPERATURE: 98 F | DIASTOLIC BLOOD PRESSURE: 62 MMHG | WEIGHT: 135.13 LBS

## 2024-07-08 VITALS
SYSTOLIC BLOOD PRESSURE: 116 MMHG | HEART RATE: 70 BPM | TEMPERATURE: 98 F | OXYGEN SATURATION: 99 % | DIASTOLIC BLOOD PRESSURE: 62 MMHG | RESPIRATION RATE: 16 BRPM

## 2024-07-08 DIAGNOSIS — R63.4 WEIGHT DECREASE: ICD-10-CM

## 2024-07-08 DIAGNOSIS — E43 SEVERE MALNUTRITION: ICD-10-CM

## 2024-07-08 DIAGNOSIS — D84.821 IMMUNODEFICIENCY DUE TO CHEMOTHERAPY: ICD-10-CM

## 2024-07-08 DIAGNOSIS — D49.9 IMMUNODEFICIENCY SECONDARY TO NEOPLASM: ICD-10-CM

## 2024-07-08 DIAGNOSIS — C78.7 METASTATIC COLON CANCER TO LIVER: Primary | ICD-10-CM

## 2024-07-08 DIAGNOSIS — C18.9 METASTATIC COLON CANCER TO LIVER: Primary | ICD-10-CM

## 2024-07-08 DIAGNOSIS — D64.81 ANEMIA ASSOCIATED WITH CHEMOTHERAPY: ICD-10-CM

## 2024-07-08 DIAGNOSIS — R52 PAIN: ICD-10-CM

## 2024-07-08 DIAGNOSIS — D84.81 IMMUNODEFICIENCY SECONDARY TO NEOPLASM: ICD-10-CM

## 2024-07-08 DIAGNOSIS — K59.03 DRUG-INDUCED CONSTIPATION: ICD-10-CM

## 2024-07-08 DIAGNOSIS — C78.7 METASTATIC COLON CANCER TO LIVER: ICD-10-CM

## 2024-07-08 DIAGNOSIS — T45.1X5A ANEMIA ASSOCIATED WITH CHEMOTHERAPY: ICD-10-CM

## 2024-07-08 DIAGNOSIS — E87.5 HYPERKALEMIA: ICD-10-CM

## 2024-07-08 DIAGNOSIS — C18.9 METASTATIC COLON CANCER TO LIVER: ICD-10-CM

## 2024-07-08 DIAGNOSIS — K92.2 LOWER GI BLEED: ICD-10-CM

## 2024-07-08 DIAGNOSIS — Z79.899 IMMUNODEFICIENCY DUE TO CHEMOTHERAPY: ICD-10-CM

## 2024-07-08 DIAGNOSIS — N17.9 AKI (ACUTE KIDNEY INJURY): ICD-10-CM

## 2024-07-08 DIAGNOSIS — T45.1X5A IMMUNODEFICIENCY DUE TO CHEMOTHERAPY: ICD-10-CM

## 2024-07-08 DIAGNOSIS — I10 HYPERTENSION, UNSPECIFIED TYPE: ICD-10-CM

## 2024-07-08 DIAGNOSIS — R39.9 LOWER URINARY TRACT SYMPTOMS (LUTS): ICD-10-CM

## 2024-07-08 LAB
ALBUMIN SERPL BCP-MCNC: 3 G/DL (ref 3.5–5.2)
ALP SERPL-CCNC: 127 U/L (ref 55–135)
ALT SERPL W/O P-5'-P-CCNC: 18 U/L (ref 10–44)
ANION GAP SERPL CALC-SCNC: 6 MMOL/L (ref 8–16)
AST SERPL-CCNC: 24 U/L (ref 10–40)
BASOPHILS # BLD AUTO: 0.02 K/UL (ref 0–0.2)
BASOPHILS NFR BLD: 0.6 % (ref 0–1.9)
BILIRUB SERPL-MCNC: 1.2 MG/DL (ref 0.1–1)
BUN SERPL-MCNC: 10 MG/DL (ref 8–23)
CALCIUM SERPL-MCNC: 8.9 MG/DL (ref 8.7–10.5)
CEA SERPL-MCNC: 4.3 NG/ML (ref 0–5)
CHLORIDE SERPL-SCNC: 110 MMOL/L (ref 95–110)
CO2 SERPL-SCNC: 23 MMOL/L (ref 23–29)
CREAT SERPL-MCNC: 1.1 MG/DL (ref 0.5–1.4)
DIFFERENTIAL METHOD BLD: ABNORMAL
EOSINOPHIL # BLD AUTO: 0.1 K/UL (ref 0–0.5)
EOSINOPHIL NFR BLD: 3.3 % (ref 0–8)
ERYTHROCYTE [DISTWIDTH] IN BLOOD BY AUTOMATED COUNT: 19.9 % (ref 11.5–14.5)
EST. GFR  (NO RACE VARIABLE): >60 ML/MIN/1.73 M^2
GLUCOSE SERPL-MCNC: 103 MG/DL (ref 70–110)
HCT VFR BLD AUTO: 27.9 % (ref 40–54)
HGB BLD-MCNC: 8.1 G/DL (ref 14–18)
IMM GRANULOCYTES # BLD AUTO: 0.01 K/UL (ref 0–0.04)
IMM GRANULOCYTES NFR BLD AUTO: 0.3 % (ref 0–0.5)
LYMPHOCYTES # BLD AUTO: 0.6 K/UL (ref 1–4.8)
LYMPHOCYTES NFR BLD: 15.2 % (ref 18–48)
MCH RBC QN AUTO: 24.3 PG (ref 27–31)
MCHC RBC AUTO-ENTMCNC: 29 G/DL (ref 32–36)
MCV RBC AUTO: 84 FL (ref 82–98)
MONOCYTES # BLD AUTO: 1 K/UL (ref 0.3–1)
MONOCYTES NFR BLD: 27.1 % (ref 4–15)
NEUTROPHILS # BLD AUTO: 1.9 K/UL (ref 1.8–7.7)
NEUTROPHILS NFR BLD: 53.5 % (ref 38–73)
NRBC BLD-RTO: 0 /100 WBC
PLATELET # BLD AUTO: 172 K/UL (ref 150–450)
PMV BLD AUTO: 10.3 FL (ref 9.2–12.9)
POTASSIUM SERPL-SCNC: 4.6 MMOL/L (ref 3.5–5.1)
PROT SERPL-MCNC: 6.5 G/DL (ref 6–8.4)
RBC # BLD AUTO: 3.33 M/UL (ref 4.6–6.2)
SODIUM SERPL-SCNC: 139 MMOL/L (ref 136–145)
WBC # BLD AUTO: 3.62 K/UL (ref 3.9–12.7)

## 2024-07-08 PROCEDURE — 3078F DIAST BP <80 MM HG: CPT | Mod: CPTII,S$GLB,, | Performed by: PHYSICIAN ASSISTANT

## 2024-07-08 PROCEDURE — 1101F PT FALLS ASSESS-DOCD LE1/YR: CPT | Mod: CPTII,S$GLB,, | Performed by: PHYSICIAN ASSISTANT

## 2024-07-08 PROCEDURE — G2211 COMPLEX E/M VISIT ADD ON: HCPCS | Mod: S$GLB,,, | Performed by: PHYSICIAN ASSISTANT

## 2024-07-08 PROCEDURE — 36415 COLL VENOUS BLD VENIPUNCTURE: CPT | Performed by: PHYSICIAN ASSISTANT

## 2024-07-08 PROCEDURE — 3008F BODY MASS INDEX DOCD: CPT | Mod: CPTII,S$GLB,, | Performed by: PHYSICIAN ASSISTANT

## 2024-07-08 PROCEDURE — 25000003 PHARM REV CODE 250: Performed by: PHYSICIAN ASSISTANT

## 2024-07-08 PROCEDURE — 1111F DSCHRG MED/CURRENT MED MERGE: CPT | Mod: CPTII,S$GLB,, | Performed by: PHYSICIAN ASSISTANT

## 2024-07-08 PROCEDURE — 96416 CHEMO PROLONG INFUSE W/PUMP: CPT

## 2024-07-08 PROCEDURE — 1159F MED LIST DOCD IN RCRD: CPT | Mod: CPTII,S$GLB,, | Performed by: PHYSICIAN ASSISTANT

## 2024-07-08 PROCEDURE — 82378 CARCINOEMBRYONIC ANTIGEN: CPT | Performed by: PHYSICIAN ASSISTANT

## 2024-07-08 PROCEDURE — 96375 TX/PRO/DX INJ NEW DRUG ADDON: CPT

## 2024-07-08 PROCEDURE — 63600175 PHARM REV CODE 636 W HCPCS: Performed by: PHYSICIAN ASSISTANT

## 2024-07-08 PROCEDURE — 85025 COMPLETE CBC W/AUTO DIFF WBC: CPT | Performed by: PHYSICIAN ASSISTANT

## 2024-07-08 PROCEDURE — 1160F RVW MEDS BY RX/DR IN RCRD: CPT | Mod: CPTII,S$GLB,, | Performed by: PHYSICIAN ASSISTANT

## 2024-07-08 PROCEDURE — 99999 PR PBB SHADOW E&M-EST. PATIENT-LVL III: CPT | Mod: PBBFAC,,, | Performed by: PHYSICIAN ASSISTANT

## 2024-07-08 PROCEDURE — 3288F FALL RISK ASSESSMENT DOCD: CPT | Mod: CPTII,S$GLB,, | Performed by: PHYSICIAN ASSISTANT

## 2024-07-08 PROCEDURE — 3074F SYST BP LT 130 MM HG: CPT | Mod: CPTII,S$GLB,, | Performed by: PHYSICIAN ASSISTANT

## 2024-07-08 PROCEDURE — 96367 TX/PROPH/DG ADDL SEQ IV INF: CPT

## 2024-07-08 PROCEDURE — 99215 OFFICE O/P EST HI 40 MIN: CPT | Mod: S$GLB,,, | Performed by: PHYSICIAN ASSISTANT

## 2024-07-08 PROCEDURE — 96413 CHEMO IV INFUSION 1 HR: CPT

## 2024-07-08 PROCEDURE — 80053 COMPREHEN METABOLIC PANEL: CPT | Performed by: PHYSICIAN ASSISTANT

## 2024-07-08 PROCEDURE — 1126F AMNT PAIN NOTED NONE PRSNT: CPT | Mod: CPTII,S$GLB,, | Performed by: PHYSICIAN ASSISTANT

## 2024-07-08 RX ORDER — HEPARIN 100 UNIT/ML
500 SYRINGE INTRAVENOUS
Status: CANCELLED | OUTPATIENT
Start: 2024-07-10

## 2024-07-08 RX ORDER — EPINEPHRINE 0.3 MG/.3ML
0.3 INJECTION SUBCUTANEOUS ONCE AS NEEDED
Status: DISCONTINUED | OUTPATIENT
Start: 2024-07-08 | End: 2024-07-08 | Stop reason: HOSPADM

## 2024-07-08 RX ORDER — ATROPINE SULFATE 0.4 MG/ML
0.4 INJECTION, SOLUTION ENDOTRACHEAL; INTRAMEDULLARY; INTRAMUSCULAR; INTRAVENOUS; SUBCUTANEOUS ONCE AS NEEDED
Status: CANCELLED | OUTPATIENT
Start: 2024-07-10

## 2024-07-08 RX ORDER — SODIUM CHLORIDE 0.9 % (FLUSH) 0.9 %
10 SYRINGE (ML) INJECTION
OUTPATIENT
Start: 2024-07-12

## 2024-07-08 RX ORDER — HEPARIN 100 UNIT/ML
500 SYRINGE INTRAVENOUS
OUTPATIENT
Start: 2024-07-12

## 2024-07-08 RX ORDER — ATROPINE SULFATE 0.4 MG/ML
0.4 INJECTION, SOLUTION ENDOTRACHEAL; INTRAMEDULLARY; INTRAMUSCULAR; INTRAVENOUS; SUBCUTANEOUS ONCE AS NEEDED
Status: COMPLETED | OUTPATIENT
Start: 2024-07-08 | End: 2024-07-08

## 2024-07-08 RX ORDER — PROCHLORPERAZINE EDISYLATE 5 MG/ML
5 INJECTION INTRAMUSCULAR; INTRAVENOUS ONCE AS NEEDED
Status: DISCONTINUED | OUTPATIENT
Start: 2024-07-08 | End: 2024-07-08 | Stop reason: HOSPADM

## 2024-07-08 RX ORDER — DIPHENHYDRAMINE HYDROCHLORIDE 50 MG/ML
50 INJECTION INTRAMUSCULAR; INTRAVENOUS ONCE AS NEEDED
Status: CANCELLED | OUTPATIENT
Start: 2024-07-10

## 2024-07-08 RX ORDER — SODIUM CHLORIDE 0.9 % (FLUSH) 0.9 %
10 SYRINGE (ML) INJECTION
Status: CANCELLED | OUTPATIENT
Start: 2024-07-10

## 2024-07-08 RX ORDER — PROCHLORPERAZINE EDISYLATE 5 MG/ML
5 INJECTION INTRAMUSCULAR; INTRAVENOUS ONCE AS NEEDED
Status: CANCELLED
Start: 2024-07-10

## 2024-07-08 RX ORDER — DIPHENHYDRAMINE HYDROCHLORIDE 50 MG/ML
50 INJECTION INTRAMUSCULAR; INTRAVENOUS ONCE AS NEEDED
Status: DISCONTINUED | OUTPATIENT
Start: 2024-07-08 | End: 2024-07-08 | Stop reason: HOSPADM

## 2024-07-08 RX ORDER — EPINEPHRINE 0.3 MG/.3ML
0.3 INJECTION SUBCUTANEOUS ONCE AS NEEDED
Status: CANCELLED | OUTPATIENT
Start: 2024-07-10

## 2024-07-08 RX ORDER — HEPARIN 100 UNIT/ML
500 SYRINGE INTRAVENOUS
Status: DISCONTINUED | OUTPATIENT
Start: 2024-07-08 | End: 2024-07-08 | Stop reason: HOSPADM

## 2024-07-08 RX ORDER — SODIUM CHLORIDE 0.9 % (FLUSH) 0.9 %
10 SYRINGE (ML) INJECTION
Status: DISCONTINUED | OUTPATIENT
Start: 2024-07-08 | End: 2024-07-08 | Stop reason: HOSPADM

## 2024-07-08 RX ADMIN — SODIUM CHLORIDE: 9 INJECTION, SOLUTION INTRAVENOUS at 11:07

## 2024-07-08 RX ADMIN — ATROPINE SULFATE 0.4 MG: 0.4 INJECTION, SOLUTION INTRAVENOUS at 12:07

## 2024-07-08 RX ADMIN — DEXAMETHASONE SODIUM PHOSPHATE 0.25 MG: 4 INJECTION, SOLUTION INTRA-ARTICULAR; INTRALESIONAL; INTRAMUSCULAR; INTRAVENOUS; SOFT TISSUE at 12:07

## 2024-07-08 RX ADMIN — FLUOROURACIL 3695 MG: 50 INJECTION, SOLUTION INTRAVENOUS at 02:07

## 2024-07-08 RX ADMIN — SODIUM CHLORIDE 240 MG: 9 INJECTION, SOLUTION INTRAVENOUS at 12:07

## 2024-07-08 NOTE — PROGRESS NOTES
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 Bonilla  12/30/2022: Y-90 to R liver  1/27/2023: Y-90 to R liver  5/17/2023: Y-90 to R liver  7/11/2023: C1 FOLFIRI + Pema  7/26/2023: C2 FOLFIRI + Pema  8/8/2023: C3 FOLFIRI + Pema  8/22/23: C4 FOLFIRI + Pema  Restaging CT CAP 9/1/23: Good response to chemo.  9/7/23: C5 FOLFIRI + Pema  ......................................  Restaging CT CAP 10/12/23: Good response to chemo  10/16/23: C8 FOLFIRI + Pema  .......................................  Restaging CT CAP 12/8/23: Good response to chemo  12/11/23: C12 FOLFIRI + Pema  .......................................  Restaging CT CAP 2/5/24: Good response to chemo  2/8/24: C16 FOLFIRI + Pema    Restaging CT CAP 4/26/24: Good response to chemo  Restaging CT CAP 6/6/24: Good response to chemo      Interval History:   He presents today with his daughter prior to cycle 27 of FOLFIRI plus avastin. He feels well overall.  He has no significant diarrhea.  Very mild intermittent nausea.  Mild fatigue.  Denies any pain. Had a really good holiday weekend. No other concerns or complaints. He has recovered well from his admission to the hospital for a GI bleed. Hospital course is placed below. He was evaluated by GI, stabilized and released home without complication. He denies any overt  bleeding, melena, hematochezia or hematemesis. Overall, doing much better today.     Hospital Course: Presented with lower GI bleeding on 2024 to the ER.  GI was consulted felt likely due to diverticular bleed.  Colonoscopy was done in November which showed no abnormality.  Hemoglobin was monitored without worsening noted.  Advised to stop taking Advil.     ECOG status is 1. Presents with his daughter today.    Past Medical History:   Past Medical History:   Diagnosis Date    MARIA A (acute kidney injury) 2022    Hypertension     Metastatic colon cancer to liver 2021     Past Surgical HIstory:   Past Surgical History:   Procedure Laterality Date    COLONOSCOPY N/A 2021    Procedure: COLONOSCOPY with possible stent;  Surgeon: EDILMA Bonilla MD;  Location: [facility] (2ND FLR);  Service: Colon and Rectal;  Laterality: N/A;    COLONOSCOPY N/A 11/3/2023    Procedure: COLONOSCOPY;  Surgeon: EDILMA Bonilla MD;  Location: [facility] (4TH FLR);  Service: Endoscopy;  Laterality: N/A;  prep instructions sent to pt via portal  From: EDILMA Bonilla MD  Procedure: Colonoscopy  Diagnosis: Surveillance colonoscopy - Hx of colon cancer  Procedure Timin-12 weeks  Provider: Myself  Location: [address]  Additional Scheduling Information: No scheduling con    DIAGNOSTIC LAPAROSCOPY N/A 2022    Procedure: LAPAROSCOPY, DIAGNOSTIC;  Surgeon: Adrian Rodriguez MD;  Location: [facility] OR [address] [place] [address];  Service: General;  Laterality: N/A;    INSERTION OF TUNNELED CENTRAL VENOUS CATHETER (CVC) WITH SUBCUTANEOUS PORT N/A 5/3/2021    Procedure: [id number];  Surgeon: Francisco Layton MD;  [facility];  Service: Vascular;  Laterality: N/A;    OMENTECTOMY N/A 10/20/2022    Procedure: OMENTECTOMY;  Surgeon: EDILMA Bonilla MD;  [facility];  Service: Colon and Rectal;  Laterality: N/A;    RIGHT HEMICOLECTOMY N/A 10/20/2022    Procedure: HEMICOLECTOMY, RIGHT, extended;  Surgeon: EDILMA Coleman  MD Chad;  Location: [facility] OR [address] [place] [address];  Service: Colon and Rectal;  Laterality: N/A;  Extended right hemicolectomy CONSENT IN AM     Family History:   Family History   Problem Relation Name Age of Onset    Cancer Brother         Social History:  reports that he has never smoked. He has never used smokeless tobacco. He reports that he does not currently use alcohol. He reports that he does not use drugs.    Allergies:  Review of patient's allergies indicates:  No Known Allergies    Medications:  Current Outpatient Medications   Medication Sig Dispense Refill    acetaminophen (TYLENOL) 500 MG tablet Take 1 tablet (500 mg total) by mouth every 6 (six) hours as needed for Pain (alternate with ibuprofen).  0    amLODIPine (NORVASC) 10 MG tablet Take 1 tablet (10 mg total) by mouth once daily. 90 tablet 3    LIDOcaine-prilocaine (EMLA) cream Apply topically as needed (port application). 30 g 3    ondansetron (ZOFRAN) 4 MG tablet Take 1 tablet (4 mg total) by mouth every 8 (eight) hours as needed for Nausea. 30 tablet 3    oxyCODONE (ROXICODONE) 5 MG immediate release tablet Take 1 tablet (5 mg total) by mouth every 6 (six) hours as needed for Pain. 20 tablet 0    pantoprazole (PROTONIX) 40 MG tablet Take 1 tablet (40 mg total) by mouth 2 (two) times daily before meals. 180 tablet 3    tamsulosin (FLOMAX) 0.4 mg Cap Take 1 capsule (0.4 mg total) by mouth once daily. 30 capsule 5     No current facility-administered medications for this visit.     Review of Systems   Constitutional:  Negative for activity change, appetite change, chills, diaphoresis, fatigue, fever and unexpected weight change.   HENT:  Negative for congestion, mouth sores, nosebleeds, postnasal drip, rhinorrhea, trouble swallowing and voice change.    Eyes:  Negative for pain and visual disturbance.   Respiratory:  Negative for cough, chest tightness, shortness of breath and wheezing.    Cardiovascular:  Negative for chest pain, palpitations and leg  "swelling.   Gastrointestinal:  Negative for abdominal distention, abdominal pain, blood in stool, constipation, diarrhea, nausea and vomiting.   Genitourinary:  Negative for difficulty urinating, dysuria, flank pain, frequency, hematuria and urgency.   Musculoskeletal:  Negative for arthralgias, back pain and myalgias.   Skin:  Negative for rash and wound.   Neurological:  Negative for dizziness, facial asymmetry, weakness, light-headedness, numbness and headaches.   Psychiatric/Behavioral:  Negative for agitation, behavioral problems, confusion, decreased concentration, dysphoric mood and sleep disturbance. The patient is not nervous/anxious.    All other systems reviewed and are negative.    ECOG Performance Status: 1     Objective:      Vitals:   Vitals:    07/08/24 1058   BP: 102/62   Pulse: 84   Resp: 18   Temp: 98.3 °F (36.8 °C)   TempSrc: Oral   SpO2: 100%   Weight: 61.3 kg (135 lb 2.3 oz)   Height: 5' 7" (1.702 m)         Physical Exam  Vitals reviewed.   Constitutional:       General: He is not in acute distress.     Appearance: Normal appearance. He is not ill-appearing, toxic-appearing or diaphoretic.   HENT:      Head: Normocephalic and atraumatic.      Right Ear: External ear normal.      Left Ear: External ear normal.      Nose: Nose normal.      Mouth/Throat:      Pharynx: Oropharynx is clear.   Eyes:      General: No scleral icterus.     Extraocular Movements: Extraocular movements intact.      Conjunctiva/sclera: Conjunctivae normal.      Pupils: Pupils are equal, round, and reactive to light.   Cardiovascular:      Rate and Rhythm: Normal rate and regular rhythm.      Pulses: Normal pulses.      Heart sounds: Normal heart sounds. No murmur heard.  Pulmonary:      Effort: Pulmonary effort is normal. No respiratory distress.      Breath sounds: Normal breath sounds. No wheezing.   Chest:      Comments: Port to RCW, no signs of infection.  Abdominal:      General: Abdomen is flat. Bowel sounds are " normal. There is no distension.      Palpations: Abdomen is soft. There is no mass.      Tenderness: There is no abdominal tenderness.      Comments: Vertical midline abdominal wound well healed   Musculoskeletal:         General: No swelling or deformity. Normal range of motion.      Right lower leg: No edema.      Left lower leg: No edema.   Skin:     Coloration: Skin is not jaundiced or pale.      Findings: No bruising, erythema or rash.   Neurological:      General: No focal deficit present.      Mental Status: He is alert and oriented to person, place, and time. Mental status is at baseline.      Cranial Nerves: No cranial nerve deficit.      Sensory: No sensory deficit.      Gait: Gait normal.   Psychiatric:         Mood and Affect: Mood normal.         Behavior: Behavior normal.         Thought Content: Thought content normal.         Judgment: Judgment normal.     Laboratory Data:  Lab Visit on 07/08/2024   Component Date Value Ref Range Status    Sodium 07/08/2024 139  136 - 145 mmol/L Final    Potassium 07/08/2024 4.6  3.5 - 5.1 mmol/L Final    Chloride 07/08/2024 110  95 - 110 mmol/L Final    CO2 07/08/2024 23  23 - 29 mmol/L Final    Glucose 07/08/2024 103  70 - 110 mg/dL Final    BUN 07/08/2024 10  8 - 23 mg/dL Final    Creatinine 07/08/2024 1.1  0.5 - 1.4 mg/dL Final    Calcium 07/08/2024 8.9  8.7 - 10.5 mg/dL Final    Total Protein 07/08/2024 6.5  6.0 - 8.4 g/dL Final    Albumin 07/08/2024 3.0 (L)  3.5 - 5.2 g/dL Final    Total Bilirubin 07/08/2024 1.2 (H)  0.1 - 1.0 mg/dL Final    Comment: For infants and newborns, interpretation of results should be based  on gestational age, weight and in agreement with clinical  observations.    Premature Infant recommended reference ranges:  Up to 24 hours.............<8.0 mg/dL  Up to 48 hours............<12.0 mg/dL  3-5 days..................<15.0 mg/dL  6-29 days.................<15.0 mg/dL      Alkaline Phosphatase 07/08/2024 127  55 - 135 U/L Final    AST  07/08/2024 24  10 - 40 U/L Final    ALT 07/08/2024 18  10 - 44 U/L Final    eGFR 07/08/2024 >60.0  >60 mL/min/1.73 m^2 Final    Anion Gap 07/08/2024 6 (L)  8 - 16 mmol/L Final    WBC 07/08/2024 3.62 (L)  3.90 - 12.70 K/uL Final    RBC 07/08/2024 3.33 (L)  4.60 - 6.20 M/uL Final    Hemoglobin 07/08/2024 8.1 (L)  14.0 - 18.0 g/dL Final    Hematocrit 07/08/2024 27.9 (L)  40.0 - 54.0 % Final    MCV 07/08/2024 84  82 - 98 fL Final    MCH 07/08/2024 24.3 (L)  27.0 - 31.0 pg Final    MCHC 07/08/2024 29.0 (L)  32.0 - 36.0 g/dL Final    RDW 07/08/2024 19.9 (H)  11.5 - 14.5 % Final    Platelets 07/08/2024 172  150 - 450 K/uL Final    MPV 07/08/2024 10.3  9.2 - 12.9 fL Final    Immature Granulocytes 07/08/2024 0.3  0.0 - 0.5 % Final    Gran # (ANC) 07/08/2024 1.9  1.8 - 7.7 K/uL Final    Immature Grans (Abs) 07/08/2024 0.01  0.00 - 0.04 K/uL Final    Comment: Mild elevation in immature granulocytes is non specific and   can be seen in a variety of conditions including stress response,   acute inflammation, trauma and pregnancy. Correlation with other   laboratory and clinical findings is essential.      Lymph # 07/08/2024 0.6 (L)  1.0 - 4.8 K/uL Final    Mono # 07/08/2024 1.0  0.3 - 1.0 K/uL Final    Eos # 07/08/2024 0.1  0.0 - 0.5 K/uL Final    Baso # 07/08/2024 0.02  0.00 - 0.20 K/uL Final    nRBC 07/08/2024 0  0 /100 WBC Final    Gran % 07/08/2024 53.5  38.0 - 73.0 % Final    Lymph % 07/08/2024 15.2 (L)  18.0 - 48.0 % Final    Mono % 07/08/2024 27.1 (H)  4.0 - 15.0 % Final    Eosinophil % 07/08/2024 3.3  0.0 - 8.0 % Final    Basophil % 07/08/2024 0.6  0.0 - 1.9 % Final    Differential Method 07/08/2024 Automated   Final     Assessment and Plan        1. Metastatic colon cancer to liver    2. Immunodeficiency secondary to neoplasm    3. Immunodeficiency due to chemotherapy    4. Hypertension, unspecified type    5. MARIA A (acute kidney injury)    6. Anemia associated with chemotherapy    7. Pain    8. Weight decrease    9.  Drug-induced constipation    10. Severe malnutrition    11. Lower urinary tract symptoms (LUTS)    12. Hyperkalemia    13. Lower GI bleed            1-3.  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  Guardant 360 was negative for BRAF, VIRI, HER2 amplification.   Pretreatment CEA 57.  We had a long and sonia discussion with him about his diagnosis. Unfortunately, the disease is not curable but remains treatable and he has good performance status.   Restaging scans after 7 cycles of FOLFOX + Pema showed significant reduction in colonic mass and liver mass.   we switched to maintenance as of cycle 8 with 5FU + Pema  Restaging scans from March 2022 show stable disease.   MRI on 7/18/22 showing hepatic progression of disease in the right hepatic lobe noted on the exam. CT CAP on 8/26 shows some mild progression in both liver metastases.    Discussed case at colorectal tumor board 8/31/22 and had a diagnostic lap performed by Dr. Rodriguez on 9/7 to assess for any occult peritoneal disease. Findings from the diagnostic lap were negative. Fluid from around from around the primary mass was sent for cytology that was negative for malignancy.   Underwent primary tumor resection on 10/20/22 with Dr. Bonilla.    Meanwhile his liver mets had grown.  We discussed his case at [facility] conference with plans for short term Y-90 and then restarting chemotherapy prior to considering any surgical options to his liver metastases.  He underwent Y-90 delivery on 12/30/22. Tolerated well.   CT CAP on 1/23/23 reviewed at [facility] conference with good response to Y-90, no new lesions.  Underwent second Y-90 on 1/27/23. Can consider R hepatectomy pending follow-up imaging after Y-90.     Received cycle 42 of FOLFOX (omitted oxaliplatin again starting cycle 41 due to neuropathy) on 2/9/23.  Because of 5-FU shortage nationally, we have been holding chemotherapy since mid-February 2023.  If he needs to restart chemotherapy  (I.e. no plans for surgical resection), will place him on capecitabine maintenance for duration of 5-FU shortage.     Discussed at colorectal liver mets tumor board 3/16/23.  Plan for repeat Y-90 and then consideration of surgical resection and he will meet with liver transplant team as well.  Underwent Y-90 delivery 5/17/23 with IR.     Has been on maintenance Xeloda since late April 2023.    Met with liver transplant team but ultimately opted not to proceed with transplant as he was concerned about how he would tolerate a major surgery with the life changes that would come after transplant as well. They closed out his case.    He met with Dr. Rodriguez to discuss whether surgical resection is indicated for his liver mets.  He recommended repeat MRI.  Repeat MRI unfortunately showed disease progression while on Xeloda maintenance.  Similarly his CEA garrett precipitously, all in keeping with worsening disease.    We recommended we restart IV chemotherapy with FOLFIRI + Avastin (never had irinotecan).    Because of hyperbilirubinemia he received cycle 1 with just 5-FU + Avastin on 7/11/23. Tolerated this well. Bilirubin has improved.  Received irinotecan starting with cycle 2.  Repeat CT CAP after cycle 4 shows good response to therapy.   Excellent tolerance. [facility] tumor board agrees with continuation of chemotherapy.   Repeat CT CAP after cycle 7 shows stable disease, no evidence of new or worsening disease.   Repeat CT CAP after cycle 11 shows stable disease.   Repeat CT CAP after cycle 15 shows improved disease.  Repeat CT CAP after cycle 21 shows stable disease.   Repeat CT CAP after cycle 24 shows stable disease.    Doing well today. Tolerating treatment well.   Labs reviewed and adequate for treatment.   Will proceed with cycle 27 today. Will hold avastin today due to recent hospital admission for lower GI bleed. Can proceed with FOLFIRI alone  Repeat imaging after cycle 28.     -RTC in 2 weeks for next cycle with  lab work/UA     Tempus: APC, CKS1B cng, ERCC3 cnl, PIK3CA; KENIA, TMB 12.1.    4. Hypertension   -- BP well controlled today. Feels well.   -- Following with PCP.   -- encouraged him and his daughter to monitor BP and HR closely at home.     5. MARIA A  -- Resolved   -- Monitor. Encouraged continued hydration particularly with working outside.     6. Anemia  -- Hgb stable. Monitor.  -- No signs of bleeding. Platelets normal.     7-10. Neoplasm related pain, weight loss, constipation, malnutrition  -- Pain very well controlled. Has oxycodone 5mg to use PRN but not requiring now.  -- Following with nutrition. Encouraged increased protein. Monitor.  -- Continue Miralax daily for constipation. Doing well with this.     11, Urinary Hesitancy.  -- Continue Flomax.   -- Reported improvement since starting medication.     12. Hyperkalemia  - K improved to 4.5 today. Monitor.    13. Lower GI bleed  - Hb improving. Will continue to monitor.   - No report of bleeding per patient today. Can proceed with chemotherapy. Will consult Dr. Schuster to ensure that he can resume avastin    Patient is in agreement with the proposed treatment plan. All questions were answered to the patient's satisfaction. Pt knows to call clinic if anything is needed before the next clinic visit.      FAN Meier, PAAidenC  [facility]  Dept of Hematology/Oncology  PAKATHY to GI Oncology team         Route Chart for Scheduling    Med Onc Chart Routing      Follow up with physician 2 weeks and 4 weeks.   Follow up with RACHEL 6 weeks. FOLFIRI plus avastin   Infusion scheduling note    Injection scheduling note    Labs CBC, CMP, urinalysis and CEA   Scheduling:  Preferred lab:  Lab interval: every 2 weeks     Imaging CT chest abdomen pelvis   Schduled in 4 weeks   Pharmacy appointment    Other referrals

## 2024-07-08 NOTE — PLAN OF CARE
1435 Patient tolerated folfiri with no s/s of reaction and no complaints. MVASI was discontinued and not given. 5FU pump applied, secured and running without difficulty. Instructed patient to return on Wednesday 7/10/24 at 1230 for pump d/c. He declined the AVS and ambulated out with no issues.    How Severe Are Your Spot(S)?: mild Have Your Spot(S) Been Treated In The Past?: has not been treated Hpi Title: Evaluation of a Skin Lesion

## 2024-07-08 NOTE — PLAN OF CARE
1150 Patient here for C27 MVASI/folfiri. Labs, meds and hx reviewed. Patient seen in MD office today and is appropriate for treatment. Port accessed and NS started at 25ml/hr. [place]phant and snack offered to patient.

## 2024-07-09 ENCOUNTER — HOSPITAL ENCOUNTER (INPATIENT)
Facility: HOSPITAL | Age: 74
LOS: 1 days | Discharge: HOME | DRG: 378 | End: 2024-07-11
Attending: EMERGENCY MEDICINE | Admitting: HOSPITALIST
Payer: MEDICARE

## 2024-07-09 ENCOUNTER — ANESTHESIA EVENT (OUTPATIENT)
Dept: ENDOSCOPY | Facility: HOSPITAL | Age: 74
End: 2024-07-09
Payer: MEDICARE

## 2024-07-09 DIAGNOSIS — K92.2 LOWER GI BLEED: Primary | ICD-10-CM

## 2024-07-09 DIAGNOSIS — R07.9 CHEST PAIN: ICD-10-CM

## 2024-07-09 DIAGNOSIS — K62.5 RECTAL BLEEDING: ICD-10-CM

## 2024-07-09 LAB
ABO + RH BLD: NORMAL
ALBUMIN SERPL BCP-MCNC: 3.2 G/DL (ref 3.5–5.2)
ALP SERPL-CCNC: 123 U/L (ref 55–135)
ALT SERPL W/O P-5'-P-CCNC: 19 U/L (ref 10–44)
ANION GAP SERPL CALC-SCNC: 11 MMOL/L (ref 8–16)
APTT PPP: 22.6 SEC (ref 21–32)
AST SERPL-CCNC: 26 U/L (ref 10–40)
BASOPHILS # BLD AUTO: 0 K/UL (ref 0–0.2)
BASOPHILS NFR BLD: 0 % (ref 0–1.9)
BILIRUB SERPL-MCNC: 1.6 MG/DL (ref 0.1–1)
BILIRUB UR QL STRIP: NEGATIVE
BLD GP AB SCN CELLS X3 SERPL QL: NORMAL
BUN SERPL-MCNC: 20 MG/DL (ref 8–23)
CALCIUM SERPL-MCNC: 8.6 MG/DL (ref 8.7–10.5)
CHLORIDE SERPL-SCNC: 111 MMOL/L (ref 95–110)
CLARITY UR REFRACT.AUTO: CLEAR
CO2 SERPL-SCNC: 18 MMOL/L (ref 23–29)
COLOR UR AUTO: YELLOW
CREAT SERPL-MCNC: 0.9 MG/DL (ref 0.5–1.4)
DIFFERENTIAL METHOD BLD: ABNORMAL
EOSINOPHIL # BLD AUTO: 0 K/UL (ref 0–0.5)
EOSINOPHIL NFR BLD: 0.2 % (ref 0–8)
ERYTHROCYTE [DISTWIDTH] IN BLOOD BY AUTOMATED COUNT: 19.4 % (ref 11.5–14.5)
EST. GFR  (NO RACE VARIABLE): >60 ML/MIN/1.73 M^2
FERRITIN SERPL-MCNC: 154 NG/ML (ref 20–300)
GLUCOSE SERPL-MCNC: 190 MG/DL (ref 70–110)
GLUCOSE UR QL STRIP: NEGATIVE
HCT VFR BLD AUTO: 27.6 % (ref 40–54)
HGB BLD-MCNC: 7.7 G/DL (ref 14–18)
HGB UR QL STRIP: NEGATIVE
IMM GRANULOCYTES # BLD AUTO: 0.04 K/UL (ref 0–0.04)
IMM GRANULOCYTES NFR BLD AUTO: 0.7 % (ref 0–0.5)
INR PPP: 1.1 (ref 0.8–1.2)
IRON SERPL-MCNC: 93 UG/DL (ref 45–160)
KETONES UR QL STRIP: NEGATIVE
LEUKOCYTE ESTERASE UR QL STRIP: NEGATIVE
LIPASE SERPL-CCNC: 23 U/L (ref 4–60)
LYMPHOCYTES # BLD AUTO: 0.3 K/UL (ref 1–4.8)
LYMPHOCYTES NFR BLD: 5.3 % (ref 18–48)
MCH RBC QN AUTO: 23.5 PG (ref 27–31)
MCHC RBC AUTO-ENTMCNC: 27.9 G/DL (ref 32–36)
MCV RBC AUTO: 84 FL (ref 82–98)
MONOCYTES # BLD AUTO: 0.4 K/UL (ref 0.3–1)
MONOCYTES NFR BLD: 6.2 % (ref 4–15)
NEUTROPHILS # BLD AUTO: 4.9 K/UL (ref 1.8–7.7)
NEUTROPHILS NFR BLD: 87.6 % (ref 38–73)
NITRITE UR QL STRIP: NEGATIVE
NRBC BLD-RTO: 0 /100 WBC
OHS QRS DURATION: 88 MS
OHS QTC CALCULATION: 441 MS
PH UR STRIP: 6 [PH] (ref 5–8)
PLATELET # BLD AUTO: 292 K/UL (ref 150–450)
PMV BLD AUTO: 11.3 FL (ref 9.2–12.9)
POTASSIUM SERPL-SCNC: 4.6 MMOL/L (ref 3.5–5.1)
PROT SERPL-MCNC: 6.6 G/DL (ref 6–8.4)
PROT UR QL STRIP: NEGATIVE
PROTHROMBIN TIME: 11.9 SEC (ref 9–12.5)
RBC # BLD AUTO: 3.27 M/UL (ref 4.6–6.2)
SARS-COV-2 RDRP RESP QL NAA+PROBE: NEGATIVE
SATURATED IRON: 20 % (ref 20–50)
SODIUM SERPL-SCNC: 140 MMOL/L (ref 136–145)
SP GR UR STRIP: 1.01 (ref 1–1.03)
SPECIMEN OUTDATE: NORMAL
TOTAL IRON BINDING CAPACITY: 454 UG/DL (ref 250–450)
TRANSFERRIN SERPL-MCNC: 307 MG/DL (ref 200–375)
URN SPEC COLLECT METH UR: NORMAL
WBC # BLD AUTO: 5.64 K/UL (ref 3.9–12.7)

## 2024-07-09 PROCEDURE — 93005 ELECTROCARDIOGRAM TRACING: CPT

## 2024-07-09 PROCEDURE — U0002 COVID-19 LAB TEST NON-CDC: HCPCS

## 2024-07-09 PROCEDURE — 83690 ASSAY OF LIPASE: CPT

## 2024-07-09 PROCEDURE — 99223 1ST HOSP IP/OBS HIGH 75: CPT | Mod: AI,25,GC, | Performed by: HOSPITALIST

## 2024-07-09 PROCEDURE — 96375 TX/PRO/DX INJ NEW DRUG ADDON: CPT

## 2024-07-09 PROCEDURE — 96361 HYDRATE IV INFUSION ADD-ON: CPT

## 2024-07-09 PROCEDURE — 85730 THROMBOPLASTIN TIME PARTIAL: CPT

## 2024-07-09 PROCEDURE — 85025 COMPLETE CBC W/AUTO DIFF WBC: CPT

## 2024-07-09 PROCEDURE — 63600175 PHARM REV CODE 636 W HCPCS

## 2024-07-09 PROCEDURE — 25000003 PHARM REV CODE 250: Performed by: STUDENT IN AN ORGANIZED HEALTH CARE EDUCATION/TRAINING PROGRAM

## 2024-07-09 PROCEDURE — 86901 BLOOD TYPING SEROLOGIC RH(D): CPT

## 2024-07-09 PROCEDURE — 25000003 PHARM REV CODE 250

## 2024-07-09 PROCEDURE — 63600175 PHARM REV CODE 636 W HCPCS: Performed by: STUDENT IN AN ORGANIZED HEALTH CARE EDUCATION/TRAINING PROGRAM

## 2024-07-09 PROCEDURE — G0378 HOSPITAL OBSERVATION PER HR: HCPCS

## 2024-07-09 PROCEDURE — 83540 ASSAY OF IRON: CPT

## 2024-07-09 PROCEDURE — 96374 THER/PROPH/DIAG INJ IV PUSH: CPT

## 2024-07-09 PROCEDURE — 96376 TX/PRO/DX INJ SAME DRUG ADON: CPT

## 2024-07-09 PROCEDURE — 81003 URINALYSIS AUTO W/O SCOPE: CPT

## 2024-07-09 PROCEDURE — 82728 ASSAY OF FERRITIN: CPT

## 2024-07-09 PROCEDURE — 80053 COMPREHEN METABOLIC PANEL: CPT

## 2024-07-09 PROCEDURE — 25500020 PHARM REV CODE 255: Performed by: EMERGENCY MEDICINE

## 2024-07-09 PROCEDURE — 85610 PROTHROMBIN TIME: CPT

## 2024-07-09 PROCEDURE — 93010 ELECTROCARDIOGRAM REPORT: CPT | Mod: ,,, | Performed by: INTERNAL MEDICINE

## 2024-07-09 PROCEDURE — 99285 EMERGENCY DEPT VISIT HI MDM: CPT | Mod: 25

## 2024-07-09 PROCEDURE — 99497 ADVNCD CARE PLAN 30 MIN: CPT | Mod: ,,, | Performed by: HOSPITALIST

## 2024-07-09 RX ORDER — TAMSULOSIN HYDROCHLORIDE 0.4 MG/1
0.4 CAPSULE ORAL DAILY
Status: DISCONTINUED | OUTPATIENT
Start: 2024-07-09 | End: 2024-07-11 | Stop reason: HOSPADM

## 2024-07-09 RX ORDER — IBUPROFEN 200 MG
16 TABLET ORAL
Status: DISCONTINUED | OUTPATIENT
Start: 2024-07-09 | End: 2024-07-11 | Stop reason: HOSPADM

## 2024-07-09 RX ORDER — SODIUM CHLORIDE 0.9 % (FLUSH) 0.9 %
10 SYRINGE (ML) INJECTION EVERY 12 HOURS PRN
Status: DISCONTINUED | OUTPATIENT
Start: 2024-07-09 | End: 2024-07-11 | Stop reason: HOSPADM

## 2024-07-09 RX ORDER — ACETAMINOPHEN 325 MG/1
650 TABLET ORAL EVERY 4 HOURS PRN
Status: DISCONTINUED | OUTPATIENT
Start: 2024-07-09 | End: 2024-07-11 | Stop reason: HOSPADM

## 2024-07-09 RX ORDER — OXYCODONE HYDROCHLORIDE 5 MG/1
5 TABLET ORAL EVERY 6 HOURS PRN
Status: DISCONTINUED | OUTPATIENT
Start: 2024-07-09 | End: 2024-07-11 | Stop reason: HOSPADM

## 2024-07-09 RX ORDER — POLYETHYLENE GLYCOL 3350, SODIUM SULFATE ANHYDROUS, SODIUM BICARBONATE, SODIUM CHLORIDE, POTASSIUM CHLORIDE 236; 22.74; 6.74; 5.86; 2.97 G/4L; G/4L; G/4L; G/4L; G/4L
4000 POWDER, FOR SOLUTION ORAL ONCE
Status: COMPLETED | OUTPATIENT
Start: 2024-07-09 | End: 2024-07-09

## 2024-07-09 RX ORDER — ONDANSETRON HYDROCHLORIDE 2 MG/ML
4 INJECTION, SOLUTION INTRAVENOUS
Status: COMPLETED | OUTPATIENT
Start: 2024-07-09 | End: 2024-07-09

## 2024-07-09 RX ORDER — ONDANSETRON HYDROCHLORIDE 2 MG/ML
4 INJECTION, SOLUTION INTRAVENOUS EVERY 8 HOURS PRN
Status: DISCONTINUED | OUTPATIENT
Start: 2024-07-09 | End: 2024-07-11 | Stop reason: HOSPADM

## 2024-07-09 RX ORDER — GLUCAGON 1 MG
1 KIT INJECTION
Status: DISCONTINUED | OUTPATIENT
Start: 2024-07-09 | End: 2024-07-11

## 2024-07-09 RX ORDER — PANTOPRAZOLE SODIUM 40 MG/10ML
40 INJECTION, POWDER, LYOPHILIZED, FOR SOLUTION INTRAVENOUS 2 TIMES DAILY
Status: DISCONTINUED | OUTPATIENT
Start: 2024-07-09 | End: 2024-07-10

## 2024-07-09 RX ORDER — NALOXONE HCL 0.4 MG/ML
0.02 VIAL (ML) INJECTION
Status: DISCONTINUED | OUTPATIENT
Start: 2024-07-09 | End: 2024-07-11 | Stop reason: HOSPADM

## 2024-07-09 RX ORDER — AMLODIPINE BESYLATE 10 MG/1
10 TABLET ORAL DAILY
Status: DISCONTINUED | OUTPATIENT
Start: 2024-07-09 | End: 2024-07-11 | Stop reason: HOSPADM

## 2024-07-09 RX ORDER — PANTOPRAZOLE SODIUM 40 MG/10ML
80 INJECTION, POWDER, LYOPHILIZED, FOR SOLUTION INTRAVENOUS
Status: COMPLETED | OUTPATIENT
Start: 2024-07-09 | End: 2024-07-09

## 2024-07-09 RX ORDER — IBUPROFEN 200 MG
24 TABLET ORAL
Status: DISCONTINUED | OUTPATIENT
Start: 2024-07-09 | End: 2024-07-11 | Stop reason: HOSPADM

## 2024-07-09 RX ADMIN — ONDANSETRON 4 MG: 2 INJECTION INTRAMUSCULAR; INTRAVENOUS at 08:07

## 2024-07-09 RX ADMIN — SODIUM CHLORIDE 1000 ML: 9 INJECTION, SOLUTION INTRAVENOUS at 08:07

## 2024-07-09 RX ADMIN — AMLODIPINE BESYLATE 10 MG: 10 TABLET ORAL at 11:07

## 2024-07-09 RX ADMIN — PANTOPRAZOLE SODIUM 40 MG: 40 INJECTION, POWDER, FOR SOLUTION INTRAVENOUS at 09:07

## 2024-07-09 RX ADMIN — PANTOPRAZOLE SODIUM 80 MG: 40 INJECTION, POWDER, LYOPHILIZED, FOR SOLUTION INTRAVENOUS at 08:07

## 2024-07-09 RX ADMIN — IOHEXOL 100 ML: 350 INJECTION, SOLUTION INTRAVENOUS at 08:07

## 2024-07-09 RX ADMIN — POLYETHYLENE GLYCOL 3350, SODIUM SULFATE ANHYDROUS, SODIUM BICARBONATE, SODIUM CHLORIDE, POTASSIUM CHLORIDE 4000 ML: 236; 22.74; 6.74; 5.86; 2.97 POWDER, FOR SOLUTION ORAL at 06:07

## 2024-07-09 NOTE — ED PROVIDER NOTES
Encounter Date: 2024       History     Chief Complaint   Patient presents with    Rectal Bleeding     Pt complaining of abdominal pain, rectal bleeding and diarrhea that started this afternoon. Pt recently admitted for same in . Pt states clots as well. Pt also endorsing nausea and vomiting. Pt is on chemo currently for colon cancer     Pt is a 74 year old male with PMHx significant for colorectal cancer with mets to liver, currently on chemo who presents for evaluation of bloody stool, which began today. He reports an episode of bright red blood per rectum with a bowel movement today. States he was diaphoretic and fatigued at that time. Notes an associated periumbilical abdominal pain, nausea, and blood tinged vomiting. No fever, chills, chest pain, shortness of breath, numbness, or weakness. Last received chemotherapy 1 day ago.     The history is provided by the patient.     Review of patient's allergies indicates:  No Known Allergies  Past Medical History:   Diagnosis Date    MARIA A (acute kidney injury) 2022    Hypertension     Metastatic colon cancer to liver 2021     Past Surgical History:   Procedure Laterality Date    COLONOSCOPY N/A 2021    Procedure: COLONOSCOPY with possible stent;  Surgeon: EDILMA Bonilla MD;  Location: [facility] (2ND [facility]);  Service: Colon and Rectal;  Laterality: N/A;    COLONOSCOPY N/A 11/3/2023    Procedure: COLONOSCOPY;  Surgeon: EDILMA Bonilla MD;  Location: [facility] (4TH FLR);  Service: Endoscopy;  Laterality: N/A;  prep instructions sent to pt via portal  From: EDILMA Bonilla MD  Procedure: Colonoscopy  Diagnosis: Surveillance colonoscopy - Hx of colon cancer  Procedure Timin-12 weeks  Provider: Myself  Location: [address]  Additional Scheduling Information: No scheduling con    DIAGNOSTIC LAPAROSCOPY N/A 2022    Procedure: LAPAROSCOPY, DIAGNOSTIC;  Surgeon: Adrian Rodriguez MD;  Location: [facility] OR [address] [place] [address];  Service: General;  Laterality:  N/A;    INSERTION OF TUNNELED CENTRAL VENOUS CATHETER (CVC) WITH SUBCUTANEOUS PORT N/A 5/3/2021    Procedure: [id number];  Surgeon: Francisco Layton MD;  Location: [facility] OR 2ND FLR;  Service: Vascular;  Laterality: N/A;    OMENTECTOMY N/A 10/20/2022    Procedure: OMENTECTOMY;  Surgeon: EDILMA Bonilla MD;  Location: [facility] OR 2ND FLR;  Service: Colon and Rectal;  Laterality: N/A;    RIGHT HEMICOLECTOMY N/A 10/20/2022    Procedure: HEMICOLECTOMY, RIGHT, extended;  Surgeon: EDILMA Bonilla MD;  Location: [facility] OR 2ND FLR;  Service: Colon and Rectal;  Laterality: N/A;  Extended right hemicolectomy CONSENT IN AM     Family History   Problem Relation Name Age of Onset    Cancer Brother       Social History     Tobacco Use    Smoking status: Never    Smokeless tobacco: Never   Substance Use Topics    Alcohol use: Not Currently    Drug use: Never     Review of Systems    Physical Exam     Initial Vitals [07/09/24 0604]   BP Pulse Resp Temp SpO2   107/83 86 18 97.4 °F (36.3 °C) 100 %      MAP       --         Physical Exam    Nursing note and vitals reviewed.  Constitutional: No distress.   Chronically ill appearing male    HENT:   Head: Normocephalic and atraumatic.   Eyes: EOM are normal. Pupils are equal, round, and reactive to light.   Neck: Neck supple.   Cardiovascular:  Normal rate, regular rhythm and intact distal pulses.           No murmur heard.  Pulmonary/Chest: Breath sounds normal. No respiratory distress. He has no wheezes. He has no rales.   Abdominal: Abdomen is soft. He exhibits no distension. There is abdominal tenderness (generalized).   Musculoskeletal:         General: No edema. Normal range of motion.      Cervical back: Neck supple.     Neurological: He is alert and oriented to person, place, and time. He has normal strength. No sensory deficit.   Skin: Skin is warm and dry.         ED Course   Procedures  Labs Reviewed   CBC W/ AUTO DIFFERENTIAL - Abnormal; Notable for the following  components:       Result Value    RBC 3.27 (*)     Hemoglobin 7.7 (*)     Hematocrit 27.6 (*)     MCH 23.5 (*)     MCHC 27.9 (*)     RDW 19.4 (*)     Immature Granulocytes 0.7 (*)     Lymph # 0.3 (*)     Gran % 87.6 (*)     Lymph % 5.3 (*)     All other components within normal limits   COMPREHENSIVE METABOLIC PANEL - Abnormal; Notable for the following components:    Chloride 111 (*)     CO2 18 (*)     Glucose 190 (*)     Calcium 8.6 (*)     Albumin 3.2 (*)     Total Bilirubin 1.6 (*)     All other components within normal limits   IRON AND TIBC - Abnormal; Notable for the following components:    TIBC 454 (*)     All other components within normal limits    Narrative:     add on ferritin & iro/tibc per Bree Huff RN/Annabel Gambino MD order# [id number] 07/09/2024 @ 10:13    PROTIME-INR   APTT   LIPASE   URINALYSIS, REFLEX TO URINE CULTURE    Narrative:     Specimen Source->Urine   SARS-COV-2 RNA AMPLIFICATION, QUAL   IRON AND TIBC   FERRITIN   FERRITIN    Narrative:     add on ferritin & iro/tibc per Bree Huff RN/Annabel Gambino MD order# [id number] 07/09/2024 @ 10:13    TYPE & SCREEN   ISTAT CHEM8        ECG Results              EKG 12-lead (Final result)        Collection Time Result Time QRS Duration OHS QTC Calculation    07/09/24 06:36:31 07/09/24 08:05:33 88 441                     Final result by Interface, Lab In [facility] (07/09/24 08:05:39)                   Narrative:    Test Reason : K62.5,    Vent. Rate : 071 BPM     Atrial Rate : 071 BPM     P-R Int : 128 ms          QRS Dur : 088 ms      QT Int : 406 ms       P-R-T Axes : 044 -41 063 degrees     QTc Int : 441 ms    Normal sinus rhythm  Left axis deviation  Nonspecific T wave abnormality  Abnormal ECG  When compared with ECG of 25-JUN-2024 05:12,  Nonspecific T wave abnormality no longer evident in Inferior leads  Confirmed by Kannan NAIK MD ([id number]) on 7/9/2024 8:05:31 AM    Referred By: [email or web address]   SELF            Confirmed By:Kannan NAIK MD                                  Imaging Results              CTA Acute GI [place], Abdomen and Pelvis (Final result)  Result time 07/09/24 08:45:16      Final result by Almas Art MD (07/09/24 08:45:16)                   Impression:      No evidence of active GI hemorrhage.    Diverticulosis coli with no evidence of acute diverticulitis.    Postsurgical changes status post right hemicolectomy with multiple hepatic lesions status post radioembolization.  Overall similar appearance of multiple hypodense lesions.    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    Additional findings as described above.      Electronically signed by: Almas Art MD  Date:    07/09/2024  Time:    08:45               Narrative:    EXAMINATION:  CTA ACUTE GI BLEED, ABDOMEN AND PELVIS    CLINICAL HISTORY:  GI bleed;    TECHNIQUE:  CTA of the abdomen and pelvis was obtained without and with the administration of 100 cc of Omnipaque 350 intravenous contrast.  Coronal and sagittal reformats were obtained.  No oral contrast.    COMPARISON:  CTA abdomen pelvis from 06/25/2024.    FINDINGS:  Heart: Normal in size. No pericardial effusion.  Diffuse hypoattenuation of the blood pool which may be seen with anemia.  No significant calcific coronary atherosclerosis.    Lungs: Visualized portions of the lungs are clear.  Small fat containing right diaphragmatic hernia similar to prior exam.    Liver: Normal in size.  Extensive post treatment changes status post prior radial embolizations.  Multiple hypodense lesions are again visualized which are stable in size and number.  Heterogeneous enhancement of lesion of the right hepatic dome better evaluated on prior exams.  No new lesions.  Small volume of perihepatic ascites, unchanged.    Gallbladder: Gallbladder is decompressed.    Bile Ducts: No evidence of dilated ducts.    Pancreas: No mass or peripancreatic  fat stranding.    Spleen: Upper limits of normal in size.  Accessory splenule present.    Stomach and duodenum: Unremarkable.    Adrenals: Unremarkable.    Kidneys/ Ureters: Normal in size and location. Normal enhancement. A few subcentimeter hypodensities within the kidneys, too small to characterize likely representing cysts.  No hydronephrosis or nephrolithiasis. No ureteral dilatation.    Bladder: No evidence of wall thickening.    Reproductive organs: Unremarkable.    Bowel/Mesentery: Small bowel is normal in caliber with no evidence of obstruction. No evidence of inflammation or wall thickening.  Postsurgical changes status post right hemicolectomy.  Diverticulosis coli of the descending colon with no evidence of acute diverticulitis.    After the administration of contrast, no evidence of active GI hemorrhage is visualized.    Lymph nodes: No lymphadenapathy.    Vasculature: No aneurysm. No significant calcific atherosclerosis.  Chronic thrombus the main portal vein with cavernous transformation similar to prior exam.    Abdominal wall:  Unremarkable.    Bones: No acute fracture. No suspicious osseous lesions.                                       Medications   pantoprazole injection 40 mg (40 mg Intravenous Given 7/9/24 0939)   sodium chloride 0.9% flush 10 mL (has no administration in time range)   naloxone 0.4 mg/mL injection 0.02 mg (has no administration in time range)   glucose chewable tablet 16 g (has no administration in time range)   glucose chewable tablet 24 g (has no administration in time range)   glucagon (human recombinant) injection 1 mg (has no administration in time range)   acetaminophen tablet 650 mg (has no administration in time range)   ondansetron injection 4 mg (has no administration in time range)   dextrose 10% bolus 125 mL 125 mL (has no administration in time range)   dextrose 10% bolus 250 mL 250 mL (has no administration in time range)   amLODIPine tablet 10 mg (10 mg Oral  Given 7/9/24 1151)   oxyCODONE immediate release tablet 5 mg (has no administration in time range)   tamsulosin 24 hr capsule 0.4 mg (0.4 mg Oral Not Given 7/9/24 1145)   polyethylene glycol (GoLYTELY) solution (has no administration in time range)   sodium chloride 0.9% bolus 1,000 mL 1,000 mL (0 mLs Intravenous Stopped 7/9/24 0939)   ondansetron injection 4 mg (4 mg Intravenous Given 7/9/24 0801)   pantoprazole injection 80 mg (80 mg Intravenous Given 7/9/24 0801)   iohexoL (OMNIPAQUE 350) injection 100 mL (100 mLs Intravenous Given 7/9/24 0827)     Medical Decision Making  Pt presents for bloody stool. Ddx; upper GI bleed, lower GI bleed, diverticulitis, colitis, UTI, pancreatitis, cholecystitis, renal colic. Pt in no acute distress on exam. Denies any further episodes of bloody stools since this morning. Labs without leukocytosis and with anemia at baseline. EKG without ST elevation. UA negative for infection. Lipase negative. CTA without evidence of acute bleed or emergent process. Discussed case with heme/onc, plan to admit for further management and evaluation of GI bleed.     Amount and/or Complexity of Data Reviewed  Labs: ordered.  Radiology: ordered.    Risk  Prescription drug management.  Decision regarding hospitalization.                                      Clinical Impression:  Final diagnoses:  [K62.5] Rectal bleeding  [K92.2] Lower GI bleed (Primary)          ED Disposition Condition    Observation                 Jori Mora Jr., MD  Resident  07/09/24 [id number]

## 2024-07-09 NOTE — ED TRIAGE NOTES
Javier Downs, a 74 y.o. male presents to the ED w/ complaint of rectal bleeding. Admitted for the same thing recently. Currently on chemo for colon cancer    Adult Physical Assessment  LOC: Javier Downs, 74 y.o. male verified via two identifiers.  The patient is awake, alert, oriented and speaking appropriately at this time.  APPEARANCE: Patient resting comfortably and appears to be in no acute distress at this time. Patient is clean and well groomed, patient's clothing is properly fastened.  SKIN:The skin is warm and dry, color consistent with ethnicity, patient has normal skin turgor and moist mucus membranes, skin intact, no breakdown or brusing noted.  MUSCULOSKELETAL: Patient moving all extremities well, no obvious swelling or deformities noted.  RESPIRATORY: Airway is open and patent, respirations are spontaneous, patient has a normal effort and rate, no accessory muscle use noted.  CARDIAC: Patient has a normal rate and rhythm, no periphreal edema noted in any extremity, capillary refill < 3 seconds in all extremities  ABDOMEN: Soft and non tender to palpation, no abdominal distention noted. Bowel sounds present in all four quadrants. Reports rectal bleeding, rx admitted for same thing. Currently have colon cancer  NEUROLOGIC: Eyes open spontaneously, behavior appropriate to situation, follows commands, facial expression symmetrical, bilateral hand grasp equal and even, purposeful motor response noted, normal sensation in all extremities when touched with a finger.      Triage note:  Chief Complaint   Patient presents with    Rectal Bleeding     Pt complaining of abdominal pain, rectal bleeding and diarrhea that started this afternoon. Pt recently admitted for same in June. Pt states clots as well. Pt also endorsing nausea and vomiting. Pt is on chemo currently for colon cancer     Review of patient's allergies indicates:  No Known Allergies  Past Medical History:   Diagnosis Date    MARIA A (acute kidney  injury) 8/18/2022    Hypertension     Metastatic colon cancer to liver 4/22/2021

## 2024-07-09 NOTE — ANESTHESIA PREPROCEDURE EVALUATION
[facility]  Anesthesia Pre-Operative Evaluation         Patient Name: Javier Downs  YOB: 1950  MRN: [id number]    SUBJECTIVE:     Pre-operative evaluation for Procedure(s) (LRB):  COLONOSCOPY (N/A)     07/09/2024    Javier Downs is a 74 y.o. male w/ a significant PMHx of colorectal cx with mets to the liver on active IV with [facility], HTN, GERD, on chronic opiates, and rectal bleeding .    Patient now presents for the above procedure(s).    No echo on file    LDA: None documented.       PowerPort A Cath Single Lumen 05/03/21 0829 right internal jugular (Active)   Line Necessity Review Longterm central access required 07/08/24 1153   Site Assessment No drainage;No redness;No swelling;No warmth 07/08/24 1153   Line Securement Device Secured with sutures 07/08/24 1153   Dressing Type CHG impregnated dressing/sponge;Central line dressing 07/08/24 1153   Dressing Status Clean;Dry;Intact 07/08/24 1153   Dressing Intervention First dressing 07/08/24 1153   Date on Dressing 06/10/24 06/10/24 0830   Patency/Care flushed w/o difficulty;blood return present;infusing 07/08/24 1153   Status Accessed 07/08/24 1153   Accessed by: polo 07/08/24 1153   Needle Insertion Date 07/08/24 07/08/24 1153   Needle Insertion Time 1150 07/08/24 1153   Type of Needle Fam 07/08/24 1153   Gauge 20 07/08/24 1153   Needle Length 3/4 in 07/08/24 1153   Needle Status Left in place 07/08/24 1153   Flush Performed Yes 07/08/24 1153   Date to be Reflushed 06/12/24 06/10/24 1217   Needle Removal Date 06/26/24 06/26/24 1518   Needle Removal Time 1519 06/26/24 1518   Deaccessed By simone [email or web address] 06/12/24 1017   Number of days: 1163            Peripheral IV - Single Lumen 07/09/24 0654 20 G Anterior;Distal;Right Upper Arm (Active)   Site Assessment Clean;Dry;Intact 07/09/24 0654   Dressing Status Clean;Dry;Intact 07/09/24 0654   Number of days: 0       Prev airway:     Date/Time: 10/20/2022 4:17 PM  Performed by:  Andre Olsen CRNA  Authorized by: Samir Barajas MD      Intubation:     Induction:  Intravenous    Intubated:  Postinduction    Mask Ventilation:  Easy mask    Attempts:  1    Attempted By:  CRNA    Method of Intubation:  Video laryngoscopy    Blade:  Stack 3    Laryngeal View Grade: Grade I - full view of cords      Difficult Airway Encountered?: No      Complications:  None    Airway Device:  Oral endotracheal tube    Airway Device Size:  7.5    Style/Cuff Inflation:  Cuffed    Inflation Amount (mL):  8    Tube secured:  22    Secured at:  The lips    Placement Verified By:  Capnometry and Revisualization with laryngoscopy    Complicating Factors:  None    Findings Post-Intubation:  BS equal bilateral and atraumatic/condition of teeth unchanged    Drips: None documented.      Patient Active Problem List   Diagnosis    Small bowel obstruction, partial    Colonic mass    Liver masses    Hypertension    Microcytic anemia    Thrombocytosis    Mesenteric vein thrombosis    Metastatic colon cancer to liver    MARIA A (acute kidney injury)    Hyperkalemia    Lower GI bleed    Encounter for palliative care       Review of patient's allergies indicates:  No Known Allergies    Current Inpatient Medications:   amLODIPine  10 mg Oral Daily    pantoprazole  40 mg Intravenous BID    polyethylene glycol  4,000 mL Oral Once    tamsulosin  0.4 mg Oral Daily       No current facility-administered medications on file prior to encounter.     Current Outpatient Medications on File Prior to Encounter   Medication Sig Dispense Refill    acetaminophen (TYLENOL) 500 MG tablet Take 1 tablet (500 mg total) by mouth every 6 (six) hours as needed for Pain (alternate with ibuprofen).  0    amLODIPine (NORVASC) 10 MG tablet Take 1 tablet (10 mg total) by mouth once daily. 90 tablet 3    LIDOcaine-prilocaine (EMLA) cream Apply topically as needed (port application). 30 g 3    ondansetron (ZOFRAN) 4 MG tablet Take 1 tablet (4 mg total)  by mouth every 8 (eight) hours as needed for Nausea. 30 tablet 3    oxyCODONE (ROXICODONE) 5 MG immediate release tablet Take 1 tablet (5 mg total) by mouth every 6 (six) hours as needed for Pain. 20 tablet 0    pantoprazole (PROTONIX) 40 MG tablet Take 1 tablet (40 mg total) by mouth 2 (two) times daily before meals. 180 tablet 3    tamsulosin (FLOMAX) 0.4 mg Cap Take 1 capsule (0.4 mg total) by mouth once daily. 30 capsule 5       Past Surgical History:   Procedure Laterality Date    COLONOSCOPY N/A 2021    Procedure: COLONOSCOPY with possible stent;  Surgeon: EDILMA Bonilla MD;  Location: [facility] (2ND FLR);  Service: Colon and Rectal;  Laterality: N/A;    COLONOSCOPY N/A 11/3/2023    Procedure: COLONOSCOPY;  Surgeon: EDILMA Bonilla MD;  Location: [facility] (4TH FLR);  Service: Endoscopy;  Laterality: N/A;  prep instructions sent to pt via portal  From: EDILMA Bonilla MD  Procedure: Colonoscopy  Diagnosis: Surveillance colonoscopy - Hx of colon cancer  Procedure Timin-12 weeks  Provider: Myself  Location: [address]  Additional Scheduling Information: No scheduling con    DIAGNOSTIC LAPAROSCOPY N/A 2022    Procedure: LAPAROSCOPY, DIAGNOSTIC;  Surgeon: Adrian Rodriguez MD;  [facility];  Service: General;  Laterality: N/A;    INSERTION OF TUNNELED CENTRAL VENOUS CATHETER (CVC) WITH SUBCUTANEOUS PORT N/A 5/3/2021    Procedure: [id number];  Surgeon: Francisco Layton MD;  [facility];  Service: Vascular;  Laterality: N/A;    OMENTECTOMY N/A 10/20/2022    Procedure: OMENTECTOMY;  Surgeon: EDILMA Bonilla MD;  [facility];  Service: Colon and Rectal;  Laterality: N/A;    RIGHT HEMICOLECTOMY N/A 10/20/2022    Procedure: HEMICOLECTOMY, RIGHT, extended;  Surgeon: EDILMA Bonilla MD;  [facility];  Service: Colon and Rectal;  Laterality: N/A;  Extended right hemicolectomy CONSENT IN AM       Social History     Socioeconomic History     Marital status:    Tobacco Use    Smoking status: Never    Smokeless tobacco: Never   Substance and Sexual Activity    Alcohol use: Not Currently    Drug use: Never    Sexual activity: Not Currently     Partners: Female     Social Determinants of Health     Financial Resource Strain: Low Risk  (6/25/2024)    Overall Financial Resource Strain (CARDIA)     Difficulty of Paying Living Expenses: Not hard at all   Food Insecurity: No Food Insecurity (6/25/2024)    Hunger Vital Sign     Worried About Running Out of Food in the Last Year: Never true     Ran Out of Food in the Last Year: Never true   Transportation Needs: No Transportation Needs (6/25/2024)    TRANSPORTATION NEEDS     Transportation : No   Physical Activity: Insufficiently Active (6/25/2024)    Exercise Vital Sign     Days of Exercise per Week: 3 days     Minutes of Exercise per Session: 30 min   Stress: No Stress Concern Present (6/25/2024)    [employer] - Occupational Stress Questionnaire     Feeling of Stress : Only a little   Housing Stability: Low Risk  (6/25/2024)    Housing Stability Vital Sign     Unable to Pay for Housing in the Last Year: No     Homeless in the Last Year: No       OBJECTIVE:     Vital Signs Range (Last 24H):  Temp:  [36.3 °C (97.4 °F)-36.8 °C (98.3 °F)]   Pulse:  [70-86]   Resp:  [13-22]   BP: (102-134)/(62-83)   SpO2:  [93 %-100 %]       Significant Labs:  Lab Results   Component Value Date    WBC 5.64 07/09/2024    HGB 7.7 (L) 07/09/2024    HCT 27.6 (L) 07/09/2024     07/09/2024    CHOL 144 04/18/2021    TRIG 94 10/29/2022    HDL 34 (L) 04/18/2021    ALT 19 07/09/2024    AST 26 07/09/2024     07/09/2024    K 4.6 07/09/2024     (H) 07/09/2024    CREATININE 0.9 07/09/2024    BUN 20 07/09/2024    CO2 18 (L) 07/09/2024    INR 1.1 07/09/2024       Diagnostic Studies: No relevant studies.    EKG:   Results for orders placed or performed during the hospital encounter of  07/09/24   EKG 12-lead    Collection Time: 07/09/24  6:36 AM   Result Value Ref Range    QRS Duration 88 ms    [facility] QTC Calculation 441 ms    Narrative    Test Reason : K62.5,    Vent. Rate : 071 BPM     Atrial Rate : 071 BPM     P-R Int : 128 ms          QRS Dur : 088 ms      QT Int : 406 ms       P-R-T Axes : 044 -41 063 degrees     QTc Int : 441 ms    Normal sinus rhythm  Left axis deviation  Nonspecific T wave abnormality  Abnormal ECG  When compared with ECG of 25-JUN-2024 05:12,  Nonspecific T wave abnormality no longer evident in Inferior leads  Confirmed by Kannan NAIK MD ([id number]) on 7/9/2024 8:05:31 AM    Referred By: [email or web address]   SELF           Confirmed By:Kanann NAIK MD       2D ECHO:  TTE:  No results found. However, due to the size of the patient record, not all encounters were searched. Please check Results Review for a complete set of results.    JUNO:  No results found. However, due to the size of the patient record, not all encounters were searched. Please check Results Review for a complete set of results.    ASSESSMENT/PLAN:         Pre-op Assessment    I have reviewed the Patient Summary Reports.     I have reviewed the Nursing Notes. I have reviewed the NPO Status.   I have reviewed the Medications.     Review of Systems  Anesthesia Hx:   History of prior surgery of interest to airway management or planning:          Denies Family Hx of Anesthesia complications.    Denies Personal Hx of Anesthesia complications.                    Social:  Non-Smoker, No Alcohol Use       Hematology/Oncology:  Hematology Normal                     Current/Recent Cancer.                EENT/Dental:  EENT/Dental Normal           Cardiovascular:     Hypertension    Denies CAD.        Denies CHF.        Mesenteric vein thrombosis                         Pulmonary:  Pulmonary Normal                       Renal/:  Chronic Renal Disease                Hepatic/GI:     GERD Liver Disease, (Liver masses)  Colonic mass           Musculoskeletal:  Musculoskeletal Normal                Neurological:  Neurology Normal                                      Endocrine:  Endocrine Normal Denies Diabetes.         Denies Obesity / BMI > 30  Dermatological:  Skin Normal    Psych:  Psychiatric Normal                    Physical Exam  General: Well nourished, Alert, Cooperative and Oriented    Airway:  Mallampati: II / I  Mouth Opening: Normal  TM Distance: Normal  Tongue: Normal  Neck ROM: Normal ROM    Dental:  Periodontal disease  Missing most teeth      Anesthesia Plan  Type of Anesthesia, risks & benefits discussed:    Anesthesia Type: Gen Natural Airway, Gen ETT, Gen Supraglottic Airway  Intra-op Monitoring Plan: Standard ASA Monitors  Post Op Pain Control Plan: multimodal analgesia and IV/PO Opioids PRN  Induction:  IV  Airway Plan: Direct and Video, Post-Induction  ASA Score: 3  Day of Surgery Review of History & Physical: H&P Update referred to the surgeon/provider.    Ready For Surgery From Anesthesia Perspective.     .

## 2024-07-09 NOTE — ACP (ADVANCE CARE PLANNING)
Advance Care Planning     Date: 07/09/2024    Code Status  In light of the patients advanced and life limiting illness, Dr. Gambino engaged the patient in a voluntary conversation about the patient's preferences for care at the very end of life. Patient wishes to remain full code.       A total of 5 min was spent on advance care planning, goals of care discussion, emotional support, formulating and communicating prognosis and exploring burden/benefit of various approaches of treatment. This discussion occurred on a fully voluntary basis with the verbal consent of the patient and/or family.

## 2024-07-09 NOTE — HPI
Ms. 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 He followed by Dr. Dykes presents with abd pain, N/V, bloody stools. He describes bowel movement was initially dark but progressed to bright red blood which was followed by 3 episodes of vomiting. He describes one small blood clot in his vomit but denies blood. His nausea did not improve with Zofran. In addition, he complains of night sweats, and abdominal pain which are typical for him after chemotherapy. Denies NSAID, alcohol and tobacco use.      In the ED the patient was given 1 L IVF bolus and started on PPI bolus. Of note the patient was seen 6/24 for a GIB which was thought to be diverticular and hb was stable so the patient was discharged.

## 2024-07-09 NOTE — ASSESSMENT & PLAN NOTE
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 The patient was admitted 6/2024 for a similar presentation and was 2/2 to diverticular bleed and was managed conservatively. He has been hemodynamically stable and has had stable hemoglobin. CTA unremarkable for active GI bleed with continued diverticulosis.     Lower GI bleed  Transverse colon CA w/mets to the liver w/hemicolectomy   Diverticular bleed history     - Plan for colonoscopy 7/10/24  - Clear liquid diet and NPO at midnight 7/10/24  - Golytely prep to start at 6pm 7/9/24, to be completed within 4 hours if possible  - Monitor Hgb q8hrs  - Transfuse to keep Hgb >7, plts >50  - Hold anticoagulants if safe to do so per primary team  - If becomes hemodynamically unstable with associated large volume hematochezia, recommend STAT CTA and IR embolization if positive

## 2024-07-09 NOTE — CONSULTS
[facility] - Emergency Dept  Gastroenterology  Consult Note    Patient Name: Javier Downs  MRN: [id number]  Admission Date: 7/9/2024  Hospital Length of Stay: 0 days  Code Status: Full Code   Attending Provider: Charley Gambino MD   Consulting Provider: Cherie Morgan DO  Primary Care Physician: [facility]  Principal Problem:<principal problem not specified>    Inpatient consult to Gastroenterology  Consult performed by: Cherie Morgan DO  Consult ordered by: Kaylyn Nieves MD        Subjective:     HPI:  74 YOM w/transverse colon CA w/mets to the liver s/p cycle 27 on 7/8/24 with FOLFIRI plus avastin (avastin held 7/8/24) and recent diverticular GIB who presents for GIB. He states at 4 am he had one bloody bowel movement which was initially dark but progressed to bright red. Then the patient also had 3 vomiting which is unusual for him. He was unable to tolerate his home anti-emetics. He states that there was a small blood clot in his vomit but it was not bright red or dark red. He also had night sweats and abdominal pain, all of which are typical for him after chemo. He has not used NSAIDs. Does not use EtOH or cigarettes.     In the ED the patient was given 1 L IVF bolus and started on PPI bolus. Of note the patient was seen 6/24 for a GIB which was thought to be diverticular and hb was stable so the patient was discharged.     11/3/23 - colonoscopy: Diverticulosis in the sigmoid colon and in the descending colon. Patent end-to-side ileo-colonic anastomosis, characterized by healthy appearing mucosa and an intact appearance. No specimens collected. The examined portion of the ileum was normal.    Past Medical History:   Diagnosis Date    MARIA A (acute kidney injury) 8/18/2022    Hypertension     Metastatic colon cancer to liver 4/22/2021       Past Surgical History:   Procedure Laterality Date    COLONOSCOPY N/A 4/20/2021    Procedure: COLONOSCOPY with possible stent;  Surgeon: EDILMA Bonilla MD;  Location:  [facility] (2ND FLR);  Service: Colon and Rectal;  Laterality: N/A;    COLONOSCOPY N/A 11/3/2023    Procedure: COLONOSCOPY;  Surgeon: EDILMA Bonilla MD;  Location: [facility] (4TH FLR);  Service: Endoscopy;  Laterality: N/A;  prep instructions sent to pt via portal  From: EDILMA Bonilla MD  Procedure: Colonoscopy  Diagnosis: Surveillance colonoscopy - Hx of colon cancer  Procedure Timin-12 weeks  Provider: Myself  Location: [facility]End[facility]  Additional Scheduling Information: No scheduling con    DIAGNOSTIC LAPAROSCOPY N/A 2022    Procedure: LAPAROSCOPY, DIAGNOSTIC;  Surgeon: Adrian Rodriguez MD;  Location: [facility] OR 2ND FLR;  Service: General;  Laterality: N/A;    INSERTION OF TUNNELED CENTRAL VENOUS CATHETER (CVC) WITH SUBCUTANEOUS PORT N/A 5/3/2021    Procedure: [id number];  Surgeon: Francisco Layton MD;  Location: [facility] OR 2ND FLR;  Service: Vascular;  Laterality: N/A;    OMENTECTOMY N/A 10/20/2022    Procedure: OMENTECTOMY;  Surgeon: EDILMA Bonilla MD;  Location: [facility] OR 2ND FLR;  Service: Colon and Rectal;  Laterality: N/A;    RIGHT HEMICOLECTOMY N/A 10/20/2022    Procedure: HEMICOLECTOMY, RIGHT, extended;  Surgeon: EDILMA Bonilla MD;  Location: [facility] OR 2ND FLR;  Service: Colon and Rectal;  Laterality: N/A;  Extended right hemicolectomy CONSENT IN AM       Review of patient's allergies indicates:  No Known Allergies  Family History       Problem Relation (Age of Onset)    Cancer Brother          Tobacco Use    Smoking status: Never    Smokeless tobacco: Never   Substance and Sexual Activity    Alcohol use: Not Currently    Drug use: Never    Sexual activity: Not Currently     Partners: Female     Review of Systems   Constitutional:  Positive for diaphoresis.   Gastrointestinal:  Positive for abdominal pain, anal bleeding, blood in stool, nausea and vomiting. Negative for abdominal distention, constipation, diarrhea and rectal pain.     Objective:     Vital Signs (Most Recent):  Temp: 97.4 °F  (36.3 °C) (07/09/24 0604)  Pulse: 74 (07/09/24 0843)  Resp: 13 (07/09/24 0809)  BP: 133/74 (07/09/24 0843)  SpO2: 100 % (07/09/24 0843) Vital Signs (24h Range):  Temp:  [97.4 °F (36.3 °C)-98.3 °F (36.8 °C)] 97.4 °F (36.3 °C)  Pulse:  [70-86] 74  Resp:  [13-22] 13  SpO2:  [93 %-100 %] 100 %  BP: (102-134)/(62-83) 133/74     Weight: 60.8 kg (134 lb) (07/09/24 0604)  Body mass index is 20.99 kg/m².    No intake or output data in the 24 hours ending 07/09/24 0940    Lines/Drains/Airways       Central Venous Catheter Line  Duration                  PowerPort A Cath Single Lumen 05/03/21 0829 right internal jugular 1163 days              Peripheral Intravenous Line  Duration                  Peripheral IV - Single Lumen 07/09/24 0654 20 G Anterior;Distal;Right Upper Arm <1 day                     Physical Exam  Constitutional:       Appearance: Normal appearance.   HENT:      Head: Normocephalic and atraumatic.      Right Ear: External ear normal.      Left Ear: External ear normal.      Nose: Nose normal.   Eyes:      General:         Right eye: No discharge.         Left eye: No discharge.   Cardiovascular:      Rate and Rhythm: Normal rate and regular rhythm.      Pulses: Normal pulses.      Heart sounds: Normal heart sounds.   Pulmonary:      Effort: Pulmonary effort is normal.      Breath sounds: Normal breath sounds.   Abdominal:      General: Abdomen is flat. There is no distension.      Palpations: Abdomen is soft. There is no mass.      Tenderness: There is no abdominal tenderness. There is no guarding or rebound.      Hernia: No hernia is present.   Musculoskeletal:      Right lower leg: No edema.      Left lower leg: No edema.   Skin:     General: Skin is warm and dry.   Neurological:      Mental Status: He is alert and oriented to person, place, and time.   Psychiatric:         Mood and Affect: Mood normal.         Behavior: Behavior normal.          Significant Labs:  All pertinent lab results from the last  24 hours have been reviewed.    Significant Imaging:  Imaging results within the past 24 hours have been reviewed.  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 The patient was admitted 6/2024 for a similar presentation and was 2/2 to diverticular bleed and was managed conservatively. He has been hemodynamically stable and has had stable hemoglobin. CTA unremarkable for active GI bleed with continued diverticulosis.     Lower GI bleed  Transverse colon CA w/mets to the liver w/hemicolectomy   Diverticular bleed history     - Plan for colonoscopy 7/10/24  - Clear liquid diet and NPO at midnight 7/10/24  - Golytely prep to start at 6pm 7/9/24, to be completed within 4 hours if possible  - Monitor Hgb q8hrs  - Transfuse to keep Hgb >7, plts >50  - Hold anticoagulants if safe to do so per primary team  - If becomes hemodynamically unstable with associated large volume hematochezia, recommend STAT CTA and IR embolization if positive         Thank you for your consult. I will follow-up with patient. Please contact us if you have any additional questions.    Cherie Morgan, DO  Gastroenterology  [facility] - Emergency Dept

## 2024-07-09 NOTE — ED NOTES
Pt moved to room 30.  Continued care.  Pt  placed back on cardiac, blood pressure & pulse ox monitoring.  Daughter remains with pt.

## 2024-07-09 NOTE — ASSESSMENT & PLAN NOTE
Metastatic CRC w/ mets to liver. Under the care of an outpatient oncologist, Dr. Dykes. Treatment has included 42 cycles of FOLFOX and current therapy of FOLFIRI + Avastin cycle 27.     Plan:   - trend LFTs  -

## 2024-07-09 NOTE — ASSESSMENT & PLAN NOTE
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 The patient was admitted 6/2024 for a similar presentation and was 2/2 to diverticular bleed and was managed conservatively. He has been hemodynamically stable and has had stable hemoglobin. CTA unremarkable for active GI bleed with continued diverticulosis.     Plan   - colonoscopy 7/10   - NPO midnight w/ clear liquid diet and prep   - transfuse hbg <7

## 2024-07-09 NOTE — SUBJECTIVE & OBJECTIVE
Oncology Treatment Plan:   OP COLORECTAL FOLFIRI + BEVACIZUMAB Q2W    Medications:  Continuous Infusions:  Scheduled Meds:   amLODIPine  10 mg Oral Daily    pantoprazole  40 mg Intravenous BID    polyethylene glycol  4,000 mL Oral Once    tamsulosin  0.4 mg Oral Daily     PRN Meds:  Current Facility-Administered Medications:     acetaminophen, 650 mg, Oral, Q4H PRN    dextrose 10%, 12.5 g, Intravenous, PRN    dextrose 10%, 25 g, Intravenous, PRN    glucagon (human recombinant), 1 mg, Intramuscular, PRN    glucose, 16 g, Oral, PRN    glucose, 24 g, Oral, PRN    naloxone, 0.02 mg, Intravenous, PRN    ondansetron, 4 mg, Intravenous, Q8H PRN    oxyCODONE, 5 mg, Oral, Q6H PRN    sodium chloride 0.9%, 10 mL, Intravenous, Q12H PRN     Review of patient's allergies indicates:  No Known Allergies     Past Medical History:   Diagnosis Date    MARIA A (acute kidney injury) 2022    Hypertension     Metastatic colon cancer to liver 2021     Past Surgical History:   Procedure Laterality Date    COLONOSCOPY N/A 2021    Procedure: COLONOSCOPY with possible stent;  Surgeon: EDILMA Bonilla MD;  Location: [facility] (2ND [facility]);  Service: Colon and Rectal;  Laterality: N/A;    COLONOSCOPY N/A 11/3/2023    Procedure: COLONOSCOPY;  Surgeon: EDILMA Bonilla MD;  Location: [facility] (4TH FLR);  Service: Endoscopy;  Laterality: N/A;  prep instructions sent to pt via portal  From: EDILMA Bonilla MD  Procedure: Colonoscopy  Diagnosis: Surveillance colonoscopy - Hx of colon cancer  Procedure Timin-12 weeks  Provider: Myself  Location: [address]  Additional Scheduling Information: No scheduling con    DIAGNOSTIC LAPAROSCOPY N/A 2022    Procedure: LAPAROSCOPY, DIAGNOSTIC;  Surgeon: Adrian Rodriguez MD;  Location: [facility] OR [address] [place] [address];  Service: General;  Laterality: N/A;    INSERTION OF TUNNELED CENTRAL VENOUS CATHETER (CVC) WITH SUBCUTANEOUS PORT N/A 5/3/2021    Procedure: [id number];  Surgeon: Francisco LONDONO  MD Kinjal;  Location: [facility] OR 2ND FLR;  Service: Vascular;  Laterality: N/A;    OMENTECTOMY N/A 10/20/2022    Procedure: OMENTECTOMY;  Surgeon: EDILMA Bonilla MD;  Location: [facility] OR 2ND FLR;  Service: Colon and Rectal;  Laterality: N/A;    RIGHT HEMICOLECTOMY N/A 10/20/2022    Procedure: HEMICOLECTOMY, RIGHT, extended;  Surgeon: EDILMA Bonilla MD;  Location: [facility] OR 2ND FLR;  Service: Colon and Rectal;  Laterality: N/A;  Extended right hemicolectomy CONSENT IN AM     Family History       Problem Relation (Age of Onset)    Cancer Brother          Tobacco Use    Smoking status: Never    Smokeless tobacco: Never   Substance and Sexual Activity    Alcohol use: Not Currently    Drug use: Never    Sexual activity: Not Currently     Partners: Female       Review of Systems   Constitutional:  Negative for chills, fatigue, fever and unexpected weight change.   HENT:  Negative for mouth sores and sore throat.    Eyes:  Negative for visual disturbance.   Respiratory:  Negative for cough, shortness of breath and wheezing.    Cardiovascular:  Negative for chest pain, palpitations and leg swelling.   Gastrointestinal:  Positive for abdominal pain, anal bleeding, blood in stool, nausea and vomiting. Negative for diarrhea.   Genitourinary:  Negative for difficulty urinating.   Musculoskeletal:  Negative for arthralgias, joint swelling and myalgias.   Skin:  Negative for pallor and rash.   Neurological:  Negative for dizziness, light-headedness and headaches.   Hematological:  Negative for adenopathy.     Objective:     Vital Signs (Most Recent):  Temp: 97.4 °F (36.3 °C) (07/09/24 0604)  Pulse: 85 (07/09/24 1501)  Resp: 13 (07/09/24 1401)  BP: (!) 117/57 (07/09/24 1501)  SpO2: 100 % (07/09/24 1501) Vital Signs (24h Range):  Temp:  [97.4 °F (36.3 °C)] 97.4 °F (36.3 °C)  Pulse:  [70-86] 85  Resp:  [13-22] 13  SpO2:  [93 %-100 %] 100 %  BP: (104-134)/(57-83) 117/57     Weight: 60.8 kg (134 lb)  Body mass index is 20.99 kg/m².  Body  surface area is 1.7 meters squared.      Intake/Output Summary (Last 24 hours) at 7/9/2024 1523  Last data filed at 7/9/2024 1013  Gross per 24 hour   Intake --   Output 700 ml   Net -700 ml        Physical Exam  Constitutional:       Appearance: Normal appearance.   HENT:      Head: Normocephalic and atraumatic.      Right Ear: External ear normal.      Left Ear: External ear normal.      Nose: Nose normal.   Eyes:      General:         Right eye: No discharge.         Left eye: No discharge.   Cardiovascular:      Rate and Rhythm: Normal rate and regular rhythm.      Pulses: Normal pulses.      Heart sounds: Normal heart sounds.   Pulmonary:      Effort: Pulmonary effort is normal.      Breath sounds: Normal breath sounds.   Abdominal:      General: Abdomen is flat. There is no distension.      Palpations: Abdomen is soft. There is no mass.      Tenderness: There is no abdominal tenderness. There is no guarding or rebound.      Hernia: No hernia is present.   Musculoskeletal:      Right lower leg: No edema.      Left lower leg: No edema.   Skin:     General: Skin is warm and dry.   Neurological:      Mental Status: He is alert and oriented to person, place, and time.   Psychiatric:         Mood and Affect: Mood normal.         Behavior: Behavior normal.          Significant Labs:   CBC:   Recent Labs   Lab 07/08/24  1054 07/09/24  0708   WBC 3.62* 5.64   HGB 8.1* 7.7*   HCT 27.9* 27.6*    292    and CMP:   Recent Labs   Lab 07/08/24  1054 07/09/24  0708    140   K 4.6 4.6    111*   CO2 23 18*    190*   BUN 10 20   CREATININE 1.1 0.9   CALCIUM 8.9 8.6*   PROT 6.5 6.6   ALBUMIN 3.0* 3.2*   BILITOT 1.2* 1.6*   ALKPHOS 127 123   AST 24 26   ALT 18 19   ANIONGAP 6* 11       Diagnostic Results:  I have reviewed and interpreted all pertinent imaging results/findings within the past 24 hours.

## 2024-07-09 NOTE — SUBJECTIVE & OBJECTIVE
Past Medical History:   Diagnosis Date    MARIA A (acute kidney injury) 2022    Hypertension     Metastatic colon cancer to liver 2021       Past Surgical History:   Procedure Laterality Date    COLONOSCOPY N/A 2021    Procedure: COLONOSCOPY with possible stent;  Surgeon: EDILMA Bonilla MD;  Location: [facility] ENDO (2ND FLR);  Service: Colon and Rectal;  Laterality: N/A;    COLONOSCOPY N/A 11/3/2023    Procedure: COLONOSCOPY;  Surgeon: EDILMA Bonilla MD;  Location: [facility] ENDO (4TH FLR);  Service: Endoscopy;  Laterality: N/A;  prep instructions sent to pt via portal  From: EDILMA Bonilla MD  Procedure: Colonoscopy  Diagnosis: Surveillance colonoscopy - Hx of colon cancer  Procedure Timin-12 weeks  Provider: Myself  Location: [address]  Additional Scheduling Information: No scheduling con    DIAGNOSTIC LAPAROSCOPY N/A 2022    Procedure: LAPAROSCOPY, DIAGNOSTIC;  Surgeon: Adrian Rodriguez MD;  [facility];  Service: General;  Laterality: N/A;    INSERTION OF TUNNELED CENTRAL VENOUS CATHETER (CVC) WITH SUBCUTANEOUS PORT N/A 5/3/2021    Procedure: [id number];  Surgeon: Francisco Layton MD;  Location: [facility] OR 2ND FLR;  Service: Vascular;  Laterality: N/A;    OMENTECTOMY N/A 10/20/2022    Procedure: OMENTECTOMY;  Surgeon: EDILMA Bonilla MD;  [facility];  Service: Colon and Rectal;  Laterality: N/A;    RIGHT HEMICOLECTOMY N/A 10/20/2022    Procedure: HEMICOLECTOMY, RIGHT, extended;  Surgeon: EDILMA Bonilla MD;  [facility];  Service: Colon and Rectal;  Laterality: N/A;  Extended right hemicolectomy CONSENT IN AM       Review of patient's allergies indicates:  No Known Allergies  Family History       Problem Relation (Age of Onset)    Cancer Brother          Tobacco Use    Smoking status: Never    Smokeless tobacco: Never   Substance and Sexual Activity    Alcohol use: Not Currently    Drug use: Never    Sexual activity: Not Currently      Partners: Female     Review of Systems   Constitutional:  Positive for diaphoresis.   Gastrointestinal:  Positive for abdominal pain, anal bleeding, blood in stool, nausea and vomiting. Negative for abdominal distention, constipation, diarrhea and rectal pain.     Objective:     Vital Signs (Most Recent):  Temp: 97.4 °F (36.3 °C) (07/09/24 0604)  Pulse: 74 (07/09/24 0843)  Resp: 13 (07/09/24 0809)  BP: 133/74 (07/09/24 0843)  SpO2: 100 % (07/09/24 0843) Vital Signs (24h Range):  Temp:  [97.4 °F (36.3 °C)-98.3 °F (36.8 °C)] 97.4 °F (36.3 °C)  Pulse:  [70-86] 74  Resp:  [13-22] 13  SpO2:  [93 %-100 %] 100 %  BP: (102-134)/(62-83) 133/74     Weight: 60.8 kg (134 lb) (07/09/24 0604)  Body mass index is 20.99 kg/m².    No intake or output data in the 24 hours ending 07/09/24 0940    Lines/Drains/Airways       Central Venous Catheter Line  Duration                  PowerPort A Cath Single Lumen 05/03/21 0829 right internal jugular 1163 days              Peripheral Intravenous Line  Duration                  Peripheral IV - Single Lumen 07/09/24 0654 20 G Anterior;Distal;Right Upper Arm <1 day                     Physical Exam  Constitutional:       Appearance: Normal appearance.   HENT:      Head: Normocephalic and atraumatic.      Right Ear: External ear normal.      Left Ear: External ear normal.      Nose: Nose normal.   Eyes:      General:         Right eye: No discharge.         Left eye: No discharge.   Cardiovascular:      Rate and Rhythm: Normal rate and regular rhythm.      Pulses: Normal pulses.      Heart sounds: Normal heart sounds.   Pulmonary:      Effort: Pulmonary effort is normal.      Breath sounds: Normal breath sounds.   Abdominal:      General: Abdomen is flat. There is no distension.      Palpations: Abdomen is soft. There is no mass.      Tenderness: There is no abdominal tenderness. There is no guarding or rebound.      Hernia: No hernia is present.   Musculoskeletal:      Right lower leg: No  edema.      Left lower leg: No edema.   Skin:     General: Skin is warm and dry.   Neurological:      Mental Status: He is alert and oriented to person, place, and time.   Psychiatric:         Mood and Affect: Mood normal.         Behavior: Behavior normal.          Significant Labs:  All pertinent lab results from the last 24 hours have been reviewed.    Significant Imaging:  Imaging results within the past 24 hours have been reviewed.

## 2024-07-09 NOTE — ASSESSMENT & PLAN NOTE
Hx of microcytic anemia in setting of possible GI bleed.     Plan:  - CBC q8H to trend  - transfuse hgb <7

## 2024-07-09 NOTE — HPI
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 He states at 4 am he had one bloody bowel movement which was initially dark but progressed to bright red. Then the patient also had 3 vomiting which is unusual for him. He was unable to tolerate his home anti-emetics. He states that there was a small blood clot in his vomit but it was not bright red or dark red. He also had night sweats and abdominal pain, all of which are typical for him after chemo. He has not used NSAIDs. Does not use EtOH or cigarettes.     In the ED the patient was given 1 L IVF bolus and started on PPI bolus. Of note the patient was seen 6/24 for a GIB which was thought to be diverticular and hb was stable so the patient was discharged.     11/3/23 - colonoscopy: Diverticulosis in the sigmoid colon and in the descending colon. Patent end-to-side ileo-colonic anastomosis, characterized by healthy appearing mucosa and an intact appearance. No specimens collected. The examined portion of the ileum was normal.

## 2024-07-09 NOTE — ED NOTES
Pt care assumed.  Pt lying on stretcher, AAO x 4.  Daughter at bedside.  Pt on cardiac, blood pressure & pulse ox monitor.   Pt reports 1 episode of bloody stools this am.  States had chemo yesterday and was sent home with an infusion.  Pt reports same symptoms last time he had chemo and was admitted last week for same.  Pt updated with plan of care.  Call button within reach.

## 2024-07-09 NOTE — H&P
[facility] - Emergency Dept  Hematology/Oncology  H&P    Patient Name: Javeir Downs  MRN: [id number]  Admission Date: 7/9/2024  Code Status: Full Code   Attending Provider: Charley Gambino MD  Primary Care Physician: [facility]  Principal Problem:Lower GI bleed    Subjective:     HPI: Ms. 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 He followed by Dr. Dykes presents with abd pain, N/V, bloody stools. He describes bowel movement was initially dark but progressed to bright red blood which was followed by 3 episodes of vomiting. He describes one small blood clot in his vomit but denies blood. His nausea did not improve with Zofran. In addition, he complains of night sweats, and abdominal pain which are typical for him after chemotherapy. Denies NSAID, alcohol and tobacco use.      In the ED the patient was given 1 L IVF bolus and started on PPI bolus. Of note the patient was seen 6/24 for a GIB which was thought to be diverticular and hb was stable so the patient was discharged.          Oncology Treatment Plan:   OP COLORECTAL FOLFIRI + BEVACIZUMAB Q2W    Medications:  Continuous Infusions:  Scheduled Meds:   amLODIPine  10 mg Oral Daily    pantoprazole  40 mg Intravenous BID    polyethylene glycol  4,000 mL Oral Once    tamsulosin  0.4 mg Oral Daily     PRN Meds:  Current Facility-Administered Medications:     acetaminophen, 650 mg, Oral, Q4H PRN    dextrose 10%, 12.5 g, Intravenous, PRN    dextrose 10%, 25 g, Intravenous, PRN    glucagon (human recombinant), 1 mg, Intramuscular, PRN    glucose, 16 g, Oral, PRN    glucose, 24 g, Oral, PRN    naloxone, 0.02 mg, Intravenous, PRN    ondansetron, 4 mg, Intravenous, Q8H PRN    oxyCODONE, 5 mg, Oral, Q6H PRN    sodium chloride 0.9%, 10 mL, Intravenous, Q12H PRN     Review of patient's allergies indicates:  No Known Allergies      Past Medical History:   Diagnosis Date    MARIA A (acute kidney injury) 2022    Hypertension     Metastatic colon cancer to liver 2021     Past Surgical History:   Procedure Laterality Date    COLONOSCOPY N/A 2021    Procedure: COLONOSCOPY with possible stent;  Surgeon: EDILMA Bonilla MD;  Location: [facility] ENDO (2ND FLR);  Service: Colon and Rectal;  Laterality: N/A;    COLONOSCOPY N/A 11/3/2023    Procedure: COLONOSCOPY;  Surgeon: EDILMA Bonilla MD;  Location: [facility] ENDO (4TH FLR);  Service: Endoscopy;  Laterality: N/A;  prep instructions sent to pt via portal  From: EDILMA Bonilla MD  Procedure: Colonoscopy  Diagnosis: Surveillance colonoscopy - Hx of colon cancer  Procedure Timin-12 weeks  Provider: Myself  Location: [address]  Additional Scheduling Information: No scheduling con    DIAGNOSTIC LAPAROSCOPY N/A 2022    Procedure: LAPAROSCOPY, DIAGNOSTIC;  Surgeon: Adrian Rodriguez MD;  [facility];  Service: General;  Laterality: N/A;    INSERTION OF TUNNELED CENTRAL VENOUS CATHETER (CVC) WITH SUBCUTANEOUS PORT N/A 5/3/2021    Procedure: [id number];  Surgeon: Francisco Layton MD;  Location: [facility] OR 2ND FLR;  Service: Vascular;  Laterality: N/A;    OMENTECTOMY N/A 10/20/2022    Procedure: OMENTECTOMY;  Surgeon: EDILMA Bonilla MD;  [facility];  Service: Colon and Rectal;  Laterality: N/A;    RIGHT HEMICOLECTOMY N/A 10/20/2022    Procedure: HEMICOLECTOMY, RIGHT, extended;  Surgeon: EDILMA Bonilla MD;  [facility];  Service: Colon and Rectal;  Laterality: N/A;  Extended right hemicolectomy CONSENT IN AM     Family History       Problem Relation (Age of Onset)    Cancer Brother          Tobacco Use    Smoking status: Never    Smokeless tobacco: Never   Substance and Sexual Activity    Alcohol use: Not Currently    Drug use: Never    Sexual activity: Not Currently     Partners: Female       Review of Systems   Constitutional:  Negative  for chills, fatigue, fever and unexpected weight change.   HENT:  Negative for mouth sores and sore throat.    Eyes:  Negative for visual disturbance.   Respiratory:  Negative for cough, shortness of breath and wheezing.    Cardiovascular:  Negative for chest pain, palpitations and leg swelling.   Gastrointestinal:  Positive for abdominal pain, anal bleeding, blood in stool, nausea and vomiting. Negative for diarrhea.   Genitourinary:  Negative for difficulty urinating.   Musculoskeletal:  Negative for arthralgias, joint swelling and myalgias.   Skin:  Negative for pallor and rash.   Neurological:  Negative for dizziness, light-headedness and headaches.   Hematological:  Negative for adenopathy.     Objective:     Vital Signs (Most Recent):  Temp: 97.4 °F (36.3 °C) (07/09/24 0604)  Pulse: 85 (07/09/24 1501)  Resp: 13 (07/09/24 1401)  BP: (!) 117/57 (07/09/24 1501)  SpO2: 100 % (07/09/24 1501) Vital Signs (24h Range):  Temp:  [97.4 °F (36.3 °C)] 97.4 °F (36.3 °C)  Pulse:  [70-86] 85  Resp:  [13-22] 13  SpO2:  [93 %-100 %] 100 %  BP: (104-134)/(57-83) 117/57     Weight: 60.8 kg (134 lb)  Body mass index is 20.99 kg/m².  Body surface area is 1.7 meters squared.      Intake/Output Summary (Last 24 hours) at 7/9/2024 1523  Last data filed at 7/9/2024 1013  Gross per 24 hour   Intake --   Output 700 ml   Net -700 ml        Physical Exam  Constitutional:       Appearance: Normal appearance.   HENT:      Head: Normocephalic and atraumatic.      Right Ear: External ear normal.      Left Ear: External ear normal.      Nose: Nose normal.   Eyes:      General:         Right eye: No discharge.         Left eye: No discharge.   Cardiovascular:      Rate and Rhythm: Normal rate and regular rhythm.      Pulses: Normal pulses.      Heart sounds: Normal heart sounds.   Pulmonary:      Effort: Pulmonary effort is normal.      Breath sounds: Normal breath sounds.   Abdominal:      General: Abdomen is flat. There is no distension.       Palpations: Abdomen is soft. There is no mass.      Tenderness: There is no abdominal tenderness. There is no guarding or rebound.      Hernia: No hernia is present.   Musculoskeletal:      Right lower leg: No edema.      Left lower leg: No edema.   Skin:     General: Skin is warm and dry.   Neurological:      Mental Status: He is alert and oriented to person, place, and time.   Psychiatric:         Mood and Affect: Mood normal.         Behavior: Behavior normal.          Significant Labs:   CBC:   Recent Labs   Lab 07/08/24  1054 07/09/24  0708   WBC 3.62* 5.64   HGB 8.1* 7.7*   HCT 27.9* 27.6*    292    and CMP:   Recent Labs   Lab 07/08/24  1054 07/09/24  0708    140   K 4.6 4.6    111*   CO2 23 18*    190*   BUN 10 20   CREATININE 1.1 0.9   CALCIUM 8.9 8.6*   PROT 6.5 6.6   ALBUMIN 3.0* 3.2*   BILITOT 1.2* 1.6*   ALKPHOS 127 123   AST 24 26   ALT 18 19   ANIONGAP 6* 11       Diagnostic Results:  I have reviewed and interpreted all pertinent imaging results/findings within the past 24 hours.  Assessment/Plan:     * Lower GI bleed  74 YOM w/transverse colon CA w/mets to the liver s/p cycle 27 on 7/8/24 with FOLFIRI plus avastin (held 7/8/24) and recent diverticular GIB who presents for GIB with a 1x BRBPR associated with N/V (unlikely hematemesis). The patient was admitted 6/2024 for a similar presentation and was 2/2 to diverticular bleed and was managed conservatively. He has been hemodynamically stable and has had stable hemoglobin. CTA unremarkable for active GI bleed with continued diverticulosis.     Plan   - colonoscopy 7/10   - NPO midnight w/ clear liquid diet and prep   - transfuse hbg <7     Metastatic colon cancer to liver  Metastatic CRC w/ mets to liver. Under the care of an outpatient oncologist, Dr. Dykes. Treatment has included 42 cycles of FOLFOX and current therapy of FOLFIRI + Avastin cycle 27.     Plan:   - trend LFTs  -      Microcytic anemia  Hx of microcytic  anemia in setting of possible GI bleed.     Plan:  - CBC q8H to trend  - transfuse hgb <7    Liver masses  See metastatic colon cancer to liver         Mohamud Mcgovern MD  Hematology/Oncology  [facility] - Emergency Dept

## 2024-07-10 ENCOUNTER — ANESTHESIA (OUTPATIENT)
Dept: ENDOSCOPY | Facility: HOSPITAL | Age: 74
End: 2024-07-10
Payer: MEDICARE

## 2024-07-10 LAB
ALBUMIN SERPL BCP-MCNC: 2.8 G/DL (ref 3.5–5.2)
ALP SERPL-CCNC: 99 U/L (ref 55–135)
ALT SERPL W/O P-5'-P-CCNC: 16 U/L (ref 10–44)
ANION GAP SERPL CALC-SCNC: 7 MMOL/L (ref 8–16)
AST SERPL-CCNC: 25 U/L (ref 10–40)
BASOPHILS # BLD AUTO: 0 K/UL (ref 0–0.2)
BASOPHILS NFR BLD: 0 % (ref 0–1.9)
BILIRUB SERPL-MCNC: 1.4 MG/DL (ref 0.1–1)
BLD PROD TYP BPU: NORMAL
BLOOD UNIT EXPIRATION DATE: NORMAL
BLOOD UNIT TYPE CODE: 6200
BLOOD UNIT TYPE: NORMAL
BUN SERPL-MCNC: 19 MG/DL (ref 8–23)
CALCIUM SERPL-MCNC: 8.6 MG/DL (ref 8.7–10.5)
CHLORIDE SERPL-SCNC: 111 MMOL/L (ref 95–110)
CO2 SERPL-SCNC: 22 MMOL/L (ref 23–29)
CODING SYSTEM: NORMAL
CREAT SERPL-MCNC: 0.9 MG/DL (ref 0.5–1.4)
CROSSMATCH INTERPRETATION: NORMAL
DIFFERENTIAL METHOD BLD: ABNORMAL
DISPENSE STATUS: NORMAL
EOSINOPHIL # BLD AUTO: 0 K/UL (ref 0–0.5)
EOSINOPHIL NFR BLD: 0 % (ref 0–8)
ERYTHROCYTE [DISTWIDTH] IN BLOOD BY AUTOMATED COUNT: 19.6 % (ref 11.5–14.5)
EST. GFR  (NO RACE VARIABLE): >60 ML/MIN/1.73 M^2
GLUCOSE SERPL-MCNC: 106 MG/DL (ref 70–110)
HCT VFR BLD AUTO: 20.2 % (ref 40–54)
HCT VFR BLD AUTO: 21.4 % (ref 40–54)
HGB BLD-MCNC: 6.2 G/DL (ref 14–18)
HGB BLD-MCNC: 7.2 G/DL (ref 14–18)
IMM GRANULOCYTES # BLD AUTO: 0.03 K/UL (ref 0–0.04)
IMM GRANULOCYTES NFR BLD AUTO: 0.8 % (ref 0–0.5)
LYMPHOCYTES # BLD AUTO: 0.4 K/UL (ref 1–4.8)
LYMPHOCYTES NFR BLD: 9.6 % (ref 18–48)
MAGNESIUM SERPL-MCNC: 2.4 MG/DL (ref 1.6–2.6)
MCH RBC QN AUTO: 23.6 PG (ref 27–31)
MCHC RBC AUTO-ENTMCNC: 29 G/DL (ref 32–36)
MCV RBC AUTO: 81 FL (ref 82–98)
MONOCYTES # BLD AUTO: 0.5 K/UL (ref 0.3–1)
MONOCYTES NFR BLD: 14.8 % (ref 4–15)
NEUTROPHILS # BLD AUTO: 2.7 K/UL (ref 1.8–7.7)
NEUTROPHILS NFR BLD: 74.8 % (ref 38–73)
NRBC BLD-RTO: 0 /100 WBC
PHOSPHATE SERPL-MCNC: 3.1 MG/DL (ref 2.7–4.5)
PLATELET # BLD AUTO: 183 K/UL (ref 150–450)
PMV BLD AUTO: 11.4 FL (ref 9.2–12.9)
POTASSIUM SERPL-SCNC: 4.6 MMOL/L (ref 3.5–5.1)
PROT SERPL-MCNC: 5.4 G/DL (ref 6–8.4)
RBC # BLD AUTO: 2.63 M/UL (ref 4.6–6.2)
SODIUM SERPL-SCNC: 140 MMOL/L (ref 136–145)
TRANS ERYTHROCYTES VOL PATIENT: NORMAL ML
WBC # BLD AUTO: 3.64 K/UL (ref 3.9–12.7)

## 2024-07-10 PROCEDURE — 85014 HEMATOCRIT: CPT

## 2024-07-10 PROCEDURE — 37000008 HC ANESTHESIA 1ST 15 MINUTES: Performed by: INTERNAL MEDICINE

## 2024-07-10 PROCEDURE — 45378 DIAGNOSTIC COLONOSCOPY: CPT | Mod: GC,,, | Performed by: INTERNAL MEDICINE

## 2024-07-10 PROCEDURE — 80053 COMPREHEN METABOLIC PANEL: CPT | Performed by: STUDENT IN AN ORGANIZED HEALTH CARE EDUCATION/TRAINING PROGRAM

## 2024-07-10 PROCEDURE — 86920 COMPATIBILITY TEST SPIN: CPT | Performed by: STUDENT IN AN ORGANIZED HEALTH CARE EDUCATION/TRAINING PROGRAM

## 2024-07-10 PROCEDURE — P9021 RED BLOOD CELLS UNIT: HCPCS | Performed by: STUDENT IN AN ORGANIZED HEALTH CARE EDUCATION/TRAINING PROGRAM

## 2024-07-10 PROCEDURE — 25000003 PHARM REV CODE 250: Performed by: STUDENT IN AN ORGANIZED HEALTH CARE EDUCATION/TRAINING PROGRAM

## 2024-07-10 PROCEDURE — 63600175 PHARM REV CODE 636 W HCPCS: Performed by: NURSE ANESTHETIST, CERTIFIED REGISTERED

## 2024-07-10 PROCEDURE — 36430 TRANSFUSION BLD/BLD COMPNT: CPT

## 2024-07-10 PROCEDURE — G0378 HOSPITAL OBSERVATION PER HR: HCPCS

## 2024-07-10 PROCEDURE — 99222 1ST HOSP IP/OBS MODERATE 55: CPT | Mod: 25,GC,, | Performed by: INTERNAL MEDICINE

## 2024-07-10 PROCEDURE — 25000003 PHARM REV CODE 250: Performed by: HOSPITALIST

## 2024-07-10 PROCEDURE — 83735 ASSAY OF MAGNESIUM: CPT | Performed by: STUDENT IN AN ORGANIZED HEALTH CARE EDUCATION/TRAINING PROGRAM

## 2024-07-10 PROCEDURE — 25000003 PHARM REV CODE 250: Performed by: NURSE ANESTHETIST, CERTIFIED REGISTERED

## 2024-07-10 PROCEDURE — 36415 COLL VENOUS BLD VENIPUNCTURE: CPT

## 2024-07-10 PROCEDURE — 94761 N-INVAS EAR/PLS OXIMETRY MLT: CPT

## 2024-07-10 PROCEDURE — 37000009 HC ANESTHESIA EA ADD 15 MINS: Performed by: INTERNAL MEDICINE

## 2024-07-10 PROCEDURE — 99233 SBSQ HOSP IP/OBS HIGH 50: CPT | Mod: GC,,, | Performed by: HOSPITALIST

## 2024-07-10 PROCEDURE — 85018 HEMOGLOBIN: CPT

## 2024-07-10 PROCEDURE — 63600175 PHARM REV CODE 636 W HCPCS: Performed by: STUDENT IN AN ORGANIZED HEALTH CARE EDUCATION/TRAINING PROGRAM

## 2024-07-10 PROCEDURE — 0DJD8ZZ INSPECTION OF LOWER INTESTINAL TRACT, VIA NATURAL OR ARTIFICIAL OPENING ENDOSCOPIC: ICD-10-PCS | Performed by: INTERNAL MEDICINE

## 2024-07-10 PROCEDURE — 30243N1 TRANSFUSION OF NONAUTOLOGOUS RED BLOOD CELLS INTO CENTRAL VEIN, PERCUTANEOUS APPROACH: ICD-10-PCS | Performed by: HOSPITALIST

## 2024-07-10 PROCEDURE — 85025 COMPLETE CBC W/AUTO DIFF WBC: CPT | Performed by: STUDENT IN AN ORGANIZED HEALTH CARE EDUCATION/TRAINING PROGRAM

## 2024-07-10 PROCEDURE — 96376 TX/PRO/DX INJ SAME DRUG ADON: CPT

## 2024-07-10 PROCEDURE — 36415 COLL VENOUS BLD VENIPUNCTURE: CPT | Performed by: STUDENT IN AN ORGANIZED HEALTH CARE EDUCATION/TRAINING PROGRAM

## 2024-07-10 PROCEDURE — 45378 DIAGNOSTIC COLONOSCOPY: CPT | Performed by: INTERNAL MEDICINE

## 2024-07-10 PROCEDURE — 84100 ASSAY OF PHOSPHORUS: CPT | Performed by: STUDENT IN AN ORGANIZED HEALTH CARE EDUCATION/TRAINING PROGRAM

## 2024-07-10 RX ORDER — GLUCAGON 1 MG
1 KIT INJECTION
Status: DISCONTINUED | OUTPATIENT
Start: 2024-07-10 | End: 2024-07-11 | Stop reason: HOSPADM

## 2024-07-10 RX ORDER — PANTOPRAZOLE SODIUM 40 MG/1
40 TABLET, DELAYED RELEASE ORAL
Status: DISCONTINUED | OUTPATIENT
Start: 2024-07-10 | End: 2024-07-11 | Stop reason: HOSPADM

## 2024-07-10 RX ORDER — HYDROCODONE BITARTRATE AND ACETAMINOPHEN 500; 5 MG/1; MG/1
TABLET ORAL
Status: DISCONTINUED | OUTPATIENT
Start: 2024-07-10 | End: 2024-07-11 | Stop reason: HOSPADM

## 2024-07-10 RX ORDER — PROPOFOL 10 MG/ML
VIAL (ML) INTRAVENOUS
Status: DISCONTINUED | OUTPATIENT
Start: 2024-07-10 | End: 2024-07-10

## 2024-07-10 RX ADMIN — PANTOPRAZOLE SODIUM 40 MG: 40 TABLET, DELAYED RELEASE ORAL at 04:07

## 2024-07-10 RX ADMIN — PANTOPRAZOLE SODIUM 40 MG: 40 INJECTION, POWDER, FOR SOLUTION INTRAVENOUS at 08:07

## 2024-07-10 RX ADMIN — AMLODIPINE BESYLATE 10 MG: 10 TABLET ORAL at 08:07

## 2024-07-10 RX ADMIN — SODIUM CHLORIDE: 0.9 INJECTION, SOLUTION INTRAVENOUS at 01:07

## 2024-07-10 RX ADMIN — PROPOFOL 50 MG: 10 INJECTION, EMULSION INTRAVENOUS at 01:07

## 2024-07-10 RX ADMIN — TAMSULOSIN HYDROCHLORIDE 0.4 MG: 0.4 CAPSULE ORAL at 08:07

## 2024-07-10 NOTE — HOSPITAL COURSE
Pt reported 1 episode of soft, black, dark/red blood in stool prior. Received 1 unit pRBC; Hgb 7.7 >6.2. Completed 1/2 bowel prep. Colonoscopy (7/10) revealed no active areas of bleeding. Recommended resume diet and continue PPI BID and repeat colonoscopy in 3 years. Received 2 unit of pRBC after 7.2 > 6.7 drop in Hgb. No repeat of acute episodes of bleeding. Hgb stable. Will follow up in clinic 7/15 to check labs and see need for additional transfusion. Discussed future chemotherapy treatment with Dr. Schuster; will discontinue avastin therapy.

## 2024-07-10 NOTE — SUBJECTIVE & OBJECTIVE
Interval History: VSS. AF. Pt reports 1 bowel movement that was soft, black, dark with red blood. Completed half bowel prep for anticipated colonoscopy today. Hgb 7.7 > 6.2. Received 1 unit pRBC. Otherwise well with no other complaints.     Oncology Treatment Plan:   OP COLORECTAL FOLFIRI + BEVACIZUMAB Q2W    Medications:  Continuous Infusions:  Scheduled Meds:   amLODIPine  10 mg Oral Daily    pantoprazole  40 mg Intravenous BID    tamsulosin  0.4 mg Oral Daily     PRN Meds:  Current Facility-Administered Medications:     0.9%  NaCl infusion (for blood administration), , Intravenous, Q24H PRN    acetaminophen, 650 mg, Oral, Q4H PRN    dextrose 10%, 12.5 g, Intravenous, PRN    dextrose 10%, 12.5 g, Intravenous, PRN    dextrose 10%, 25 g, Intravenous, PRN    dextrose 10%, 25 g, Intravenous, PRN    glucagon (human recombinant), 1 mg, Intramuscular, PRN    glucagon (human recombinant), 1 mg, Intramuscular, PRN    glucose, 16 g, Oral, PRN    glucose, 24 g, Oral, PRN    naloxone, 0.02 mg, Intravenous, PRN    ondansetron, 4 mg, Intravenous, Q8H PRN    oxyCODONE, 5 mg, Oral, Q6H PRN    sodium chloride 0.9%, 10 mL, Intravenous, Q12H PRN     Review of Systems   Constitutional:  Negative for chills, fatigue, fever and unexpected weight change.   HENT:  Negative for mouth sores and sore throat.    Eyes:  Negative for visual disturbance.   Respiratory:  Negative for cough, shortness of breath and wheezing.    Cardiovascular:  Negative for chest pain, palpitations and leg swelling.   Gastrointestinal:  Positive for anal bleeding and blood in stool. Negative for abdominal pain, diarrhea, nausea and vomiting.   Genitourinary:  Negative for difficulty urinating.   Musculoskeletal:  Negative for arthralgias, joint swelling and myalgias.   Skin:  Negative for pallor and rash.   Neurological:  Negative for dizziness, light-headedness and headaches.   Hematological:  Negative for adenopathy.     Objective:     Vital Signs (Most  Recent):  Temp: 98.2 °F (36.8 °C) (07/10/24 1009)  Pulse: 66 (07/10/24 1009)  Resp: 18 (07/10/24 1009)  BP: 112/63 (07/10/24 1009)  SpO2: 100 % (07/10/24 1009) Vital Signs (24h Range):  Temp:  [97.6 °F (36.4 °C)-98.5 °F (36.9 °C)] 98.2 °F (36.8 °C)  Pulse:  [66-92] 66  Resp:  [13-20] 18  SpO2:  [98 %-100 %] 100 %  BP: ()/(57-70) 112/63     Weight: 60.2 kg (132 lb 13.2 oz)  Body mass index is 20.8 kg/m².  Body surface area is 1.69 meters squared.      Intake/Output Summary (Last 24 hours) at 7/10/2024 1011  Last data filed at 7/10/2024 0951  Gross per 24 hour   Intake 284 ml   Output 700 ml   Net -416 ml        Physical Exam  Constitutional:       Appearance: Normal appearance.   HENT:      Head: Normocephalic and atraumatic.      Right Ear: External ear normal.      Left Ear: External ear normal.      Nose: Nose normal.   Eyes:      General:         Right eye: No discharge.         Left eye: No discharge.   Cardiovascular:      Rate and Rhythm: Normal rate and regular rhythm.      Pulses: Normal pulses.      Heart sounds: Normal heart sounds.   Pulmonary:      Effort: Pulmonary effort is normal.      Breath sounds: Normal breath sounds.   Abdominal:      General: Abdomen is flat. There is no distension.      Palpations: Abdomen is soft. There is no mass.      Tenderness: There is no abdominal tenderness. There is no guarding or rebound.      Hernia: No hernia is present.   Musculoskeletal:      Right lower leg: No edema.      Left lower leg: No edema.   Skin:     General: Skin is warm and dry.   Neurological:      Mental Status: He is alert and oriented to person, place, and time.   Psychiatric:         Mood and Affect: Mood normal.         Behavior: Behavior normal.          Significant Labs:   CBC:   Recent Labs   Lab 07/08/24  1054 07/09/24  0708 07/10/24  0516 07/10/24  0931   WBC 3.62* 5.64 3.64*  --    HGB 8.1* 7.7* 6.2*  --    HCT 27.9* 27.6* 21.4* 20.2*    292 183  --        Diagnostic  Results:  I have reviewed and interpreted all pertinent imaging results/findings within the past 24 hours.

## 2024-07-10 NOTE — TREATMENT PLAN
GI Post-Procedure Treatment Plan    Colonoscopy complete    Impression:            - Patent end-to-side ileo-colonic anastomosis,                          characterized by healthy appearing mucosa.                          - The examined portion of the ileum was normal.                          - Diverticulosis in the sigmoid colon, in the                          descending colon and in the transverse colon.                          - Non-bleeding internal hemorrhoids.                          - No specimens collected.     - No evidence of bleeding in the entire colon.    Recommendation:        - Return patient to hospital [facility] for ongoing care.                          - Resume regular diet.                          - Switch to home dose twice daily PPI                          - Repeat colonoscopy in 3 years for surveillance.     GI will sign off    Raúl Owens  Gastroenterology Fellow, PGY-6

## 2024-07-10 NOTE — ASSESSMENT & PLAN NOTE
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 The patient was admitted 6/2024 for a similar presentation and was 2/2 to diverticular bleed and was managed conservatively. He has been hemodynamically stable and has had stable hemoglobin. CTA unremarkable for active GI bleed with continued diverticulosis.     Colonoscopy (7/10)   Impression:            - Patent end-to-side ileo-colonic anastomosis,                          characterized by healthy appearing mucosa.                          - The examined portion of the ileum was normal.                          - Diverticulosis in the sigmoid colon, in the                          descending colon and in the transverse colon.                          - Non-bleeding internal hemorrhoids.                          - No specimens collected.     Plan   -  Resume regular diet.   - Switch to home dose twice daily PPI   - Repeat colonoscopy in 3 years for surveillance.   - appreciate GI recs   - transfuse hbg <7

## 2024-07-10 NOTE — TRANSFER OF CARE
"Anesthesia Transfer of Care Note    Patient: Javier Downs    Procedure(s) Performed: Procedure(s) (LRB):  COLONOSCOPY (N/A)    Patient location: PACU    Anesthesia Type: general    Transport from OR: Transported from OR on room air with adequate spontaneous ventilation    Post pain: adequate analgesia    Post assessment: no apparent anesthetic complications and tolerated procedure well    Post vital signs: stable    Level of consciousness: lethargic    Nausea/Vomiting: no nausea/vomiting    Complications: none    Transfer of care protocol was followed    Last vitals: Visit Vitals  /70   Pulse 79   Temp 36.7 °C (98.1 °F) (Temporal)   Resp 19   Ht 5' 7" (1.702 m)   Wt 60.2 kg (132 lb 13.2 oz)   SpO2 99%   BMI 20.80 kg/m²     "

## 2024-07-10 NOTE — PROVATION PATIENT INSTRUCTIONS
Discharge Summary/Instructions after an Endoscopic Procedure  Patient Name: Javier Downs  Patient MRN: [id number]  Patient YOB: 1950  Wednesday, July 10, 2024  Tam Pantoja MD  Dear patient,  As a result of recent federal legislation (The Federal Cures Act), you may   receive lab or pathology results from your procedure in your [facility]   account before your physician is able to contact you. Your physician or   their representative will relay the results to you with their   recommendations at their soonest availability.  Thank you,  RESTRICTIONS:  During your procedure today, you received medications for sedation.  These   medications may affect your judgment, balance and coordination.  Therefore,   for 24 hours, you have the following restrictions:   - DO NOT drive a car, operate machinery, make legal/financial decisions,   sign important papers or drink alcohol.    ACTIVITY:  Today: no heavy lifting, straining or running due to procedural   sedation/anesthesia.  The following day: return to full activity including work.  DIET:  Eat and drink normally unless instructed otherwise.     TREATMENT FOR COMMON SIDE EFFECTS:  - Mild abdominal pain, nausea, belching, bloating or excessive gas:  rest,   eat lightly and use a heating pad.  - Sore Throat: treat with throat lozenges and/or gargle with warm salt   water.  - Because air was used during the procedure, expelling large amounts of air   from your rectum or belching is normal.  - If a bowel prep was taken, you may not have a bowel movement for 1-3 days.    This is normal.  SYMPTOMS TO WATCH FOR AND REPORT TO YOUR PHYSICIAN:  1. Abdominal pain or bloating, other than gas cramps.  2. Chest pain.  3. Back pain.  4. Signs of infection such as: chills or fever occurring within 24 hours   after the procedure.  5. Rectal bleeding, which would show as bright red, maroon, or black stools.   (A tablespoon of blood from the rectum is not serious, especially if    hemorrhoids are present.)  6. Vomiting.  7. Weakness or dizziness.  GO DIRECTLY TO THE NEAREST EMERGENCY ROOM IF YOU HAVE ANY OF THE FOLLOWING:      Difficulty breathing              Chills and/or fever over 101 F   Persistent vomiting and/or vomiting blood   Severe abdominal pain   Severe chest pain   Black, tarry stools   Bleeding- more than one tablespoon   Any other symptom or condition that you feel may need urgent attention  Your doctor recommends these additional instructions:  If any biopsies were taken, your doctors clinic will contact you in 1 to 2   weeks with any results.  - Return patient to hospital [facility] for ongoing care.   - Resume regular diet.   - Switch to home dose twice daily PPI  - Repeat colonoscopy in 3 years for surveillance.   For questions, problems or results please call your physician - Tam Pantoja MD at Work:  [phone].  [facility], EMERGENCY ROOM PHONE NUMBER: [phone]  IF A COMPLICATION OR EMERGENCY SITUATION ARISES AND YOU ARE UNABLE TO REACH   YOUR PHYSICIAN - GO DIRECTLY TO THE EMERGENCY ROOM.  Tam Pantoja MD  7/10/2024 2:16:34 PM  This report has been verified and signed electronically.  Dear patient,  As a result of recent federal legislation (The Federal Cures Act), you may   receive lab or pathology results from your procedure in your [facility]   account before your physician is able to contact you. Your physician or   their representative will relay the results to you with their   recommendations at their soonest availability.  Thank you,  PROVATION   None

## 2024-07-10 NOTE — H&P
Short Stay Endoscopy History and Physical    [facility]    Procedure - Colonoscopy  ASA - per anesthesia  Mallampati - per anesthesia  Plan of anesthesia - MAC    HPI:  This is a 74 y.o. male here for evaluation of : rectal bleeding  History of colon cancer s/p R hemicolectomy in 2022 with end to side ileocolonic anastomosis    ROS:  Constitutional: No fevers, chills  CV: No chest pain  Pulm: No cough  Ophtho: No vision changes  GI: see HPI  Derm: No rash    Medical History:  has a past medical history of MARIA A (acute kidney injury) (8/18/2022), Hypertension, and Metastatic colon cancer to liver (4/22/2021).    Surgical History:  has a past surgical history that includes Colonoscopy (N/A, 4/20/2021); Insertion of tunneled central venous catheter (CVC) with subcutaneous port (N/A, 5/3/2021); Diagnostic laparoscopy (N/A, 9/7/2022); Right hemicolectomy (N/A, 10/20/2022); Omentectomy (N/A, 10/20/2022); and Colonoscopy (N/A, 11/3/2023).    Family History: family history includes Cancer in his brother.. Otherwise no colon cancer, inflammatory bowel disease, or GI malignancies.    Social History:  reports that he has never smoked. He has never used smokeless tobacco. He reports that he does not currently use alcohol. He reports that he does not use drugs.    Review of patient's allergies indicates:  No Known Allergies    Medications:   Medications Prior to Admission   Medication Sig Dispense Refill Last Dose    amLODIPine (NORVASC) 10 MG tablet Take 1 tablet (10 mg total) by mouth once daily. 90 tablet 3 7/9/2024    LIDOcaine-prilocaine (EMLA) cream Apply topically as needed (port application). 30 g 3 7/8/2024    ondansetron (ZOFRAN) 4 MG tablet Take 1 tablet (4 mg total) by mouth every 8 (eight) hours as needed for Nausea. 30 tablet 3 Past Month    pantoprazole (PROTONIX) 40 MG tablet Take 1 tablet (40 mg total) by mouth 2 (two) times daily before meals. 180 tablet 3 7/8/2024    acetaminophen (TYLENOL) 500  MG tablet Take 1 tablet (500 mg total) by mouth every 6 (six) hours as needed for Pain (alternate with ibuprofen).  0 More than a month    oxyCODONE (ROXICODONE) 5 MG immediate release tablet Take 1 tablet (5 mg total) by mouth every 6 (six) hours as needed for Pain. 20 tablet 0 More than a month    tamsulosin (FLOMAX) 0.4 mg Cap Take 1 capsule (0.4 mg total) by mouth once daily. 30 capsule 5 More than a month         Vital Signs:   Vitals:    07/10/24 1239   BP: 114/65   Pulse: 72   Resp: 18   Temp: 97.3 °F (36.3 °C)       General Appearance: Well appearing in no acute distress  Eyes:    No scleral icterus  ENT: atraumatic  Abdomen: Soft, nondistended  Extremities: no tenderness  Skin: normal color    Labs:  Lab Results   Component Value Date    WBC 3.64 (L) 07/10/2024    HGB 6.2 (L) 07/10/2024    HCT 20.2 (L) 07/10/2024     07/10/2024    CHOL 144 04/18/2021    TRIG 94 10/29/2022    HDL 34 (L) 04/18/2021    ALT 16 07/10/2024    AST 25 07/10/2024     07/10/2024    K 4.6 07/10/2024     (H) 07/10/2024    CREATININE 0.9 07/10/2024    BUN 19 07/10/2024    CO2 22 (L) 07/10/2024    INR 1.1 07/09/2024       I have explained the risks and benefits of endoscopy procedures to the patient/their POA including but not limited to bleeding, perforation, infection, and death.  The patient/their POA was asked if they understand and allowed to ask any further questions to their satisfaction.    Proceed with colonoscopy    Raúl Owens MD

## 2024-07-10 NOTE — ASSESSMENT & PLAN NOTE
Metastatic CRC w/ mets to liver. Under the care of an outpatient oncologist, Dr. Dykes. Treatment has included 42 cycles of FOLFOX and current therapy of FOLFIRI + Avastin cycle 27.     Plan:   - trend LFTs

## 2024-07-10 NOTE — PLAN OF CARE
Patient AAO x 4. Respirations even and unlabored. Participates in care and able to answer questions appropriately. Home chemo infusion pump disconnected as per order. NPO for Colonoscopy this morning. Telemetry monitored. 1 U PRBC transfused, tolerated well. Denies pain during shift. Ambulates independently in room. Patient oriented to room and use of call light. Vital signs remained WNL. Bed locked and in lowest position. Call light and personal belongings remain within reach. Patient stable at this time.

## 2024-07-10 NOTE — PROGRESS NOTES
SW attempted to complete psychosocial assessment however patient was not in room upon arrival. Chart notes that patient was down for scope procedure.     RHINA Lagunas, LCSW  Oncology Social Worker  [facility]   [phone]

## 2024-07-10 NOTE — NURSING
Patient AAO x 4. Respirations even and unlabored. Participates in care and able to answer questions appropriately. Home chemo infusion pump connected & infusing to RCW power port. Arrived to floor on clear liquid diet and became NPO after midnight for Colonoscopy this morning. Patient only consumed half of bowel prep. Stated that was all he could do. One bowel movement at beginning of shift noted to be soft, black & with dark red blood. Denies pain during shift. Ambulates independently in room. Patient oriented to room and use of call light. Vital signs remained WNL. Bed locked and in lowest position. Call light and personal belongings remain within reach. Will continue to monitor.

## 2024-07-10 NOTE — NURSING
Nurses Note -- 4 Eyes      7/10/2024   1:49 AM      Skin assessed during: Admit      [x] No Altered Skin Integrity Present    []Prevention Measures Documented      [] Yes- Altered Skin Integrity Present or Discovered   [] LDA Added if Not in Epic (Describe Wound)   [] New Altered Skin Integrity was Present on Admit and Documented in LDA   [] Wound Image Taken    Wound Care Consulted? No    Attending Nurse:  Tia Hines RN/Staff Member:  Donna

## 2024-07-10 NOTE — PROGRESS NOTES
[facility] ([facility])  Hematology/Oncology  Progress Note    Patient Name: Javier Downs  Admission Date: 7/9/2024  Hospital Length of Stay: 0 days  Code Status: Full Code     Subjective:     HPI:  Ms. 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 He followed by Dr. Dykes presents with abd pain, N/V, bloody stools. He describes bowel movement was initially dark but progressed to bright red blood which was followed by 3 episodes of vomiting. He describes one small blood clot in his vomit but denies blood. His nausea did not improve with Zofran. In addition, he complains of night sweats, and abdominal pain which are typical for him after chemotherapy. Denies NSAID, alcohol and tobacco use.      In the ED the patient was given 1 L IVF bolus and started on PPI bolus. Of note the patient was seen 6/24 for a GIB which was thought to be diverticular and hb was stable so the patient was discharged.     Interval History: VSS. AF. Pt reports 1 bowel movement that was soft, black, dark with red blood. Completed half bowel prep for anticipated colonoscopy today. Hgb 7.7 > 6.2. Received 1 unit pRBC. Otherwise well with no other complaints.     Oncology Treatment Plan:   OP COLORECTAL FOLFIRI + BEVACIZUMAB Q2W    Medications:  Continuous Infusions:  Scheduled Meds:   amLODIPine  10 mg Oral Daily    pantoprazole  40 mg Intravenous BID    tamsulosin  0.4 mg Oral Daily     PRN Meds:  Current Facility-Administered Medications:     0.9%  NaCl infusion (for blood administration), , Intravenous, Q24H PRN    acetaminophen, 650 mg, Oral, Q4H PRN    dextrose 10%, 12.5 g, Intravenous, PRN    dextrose 10%, 12.5 g, Intravenous, PRN    dextrose 10%, 25 g, Intravenous, PRN    dextrose 10%, 25 g, Intravenous, PRN    glucagon (human recombinant), 1 mg, Intramuscular, PRN    glucagon (human recombinant),  1 mg, Intramuscular, PRN    glucose, 16 g, Oral, PRN    glucose, 24 g, Oral, PRN    naloxone, 0.02 mg, Intravenous, PRN    ondansetron, 4 mg, Intravenous, Q8H PRN    oxyCODONE, 5 mg, Oral, Q6H PRN    sodium chloride 0.9%, 10 mL, Intravenous, Q12H PRN     Review of Systems   Constitutional:  Negative for chills, fatigue, fever and unexpected weight change.   HENT:  Negative for mouth sores and sore throat.    Eyes:  Negative for visual disturbance.   Respiratory:  Negative for cough, shortness of breath and wheezing.    Cardiovascular:  Negative for chest pain, palpitations and leg swelling.   Gastrointestinal:  Positive for anal bleeding and blood in stool. Negative for abdominal pain, diarrhea, nausea and vomiting.   Genitourinary:  Negative for difficulty urinating.   Musculoskeletal:  Negative for arthralgias, joint swelling and myalgias.   Skin:  Negative for pallor and rash.   Neurological:  Negative for dizziness, light-headedness and headaches.   Hematological:  Negative for adenopathy.     Objective:     Vital Signs (Most Recent):  Temp: 98.2 °F (36.8 °C) (07/10/24 1009)  Pulse: 66 (07/10/24 1009)  Resp: 18 (07/10/24 1009)  BP: 112/63 (07/10/24 1009)  SpO2: 100 % (07/10/24 1009) Vital Signs (24h Range):  Temp:  [97.6 °F (36.4 °C)-98.5 °F (36.9 °C)] 98.2 °F (36.8 °C)  Pulse:  [66-92] 66  Resp:  [13-20] 18  SpO2:  [98 %-100 %] 100 %  BP: ()/(57-70) 112/63     Weight: 60.2 kg (132 lb 13.2 oz)  Body mass index is 20.8 kg/m².  Body surface area is 1.69 meters squared.      Intake/Output Summary (Last 24 hours) at 7/10/2024 1011  Last data filed at 7/10/2024 0951  Gross per 24 hour   Intake 284 ml   Output 700 ml   Net -416 ml        Physical Exam  Constitutional:       Appearance: Normal appearance.   HENT:      Head: Normocephalic and atraumatic.      Right Ear: External ear normal.      Left Ear: External ear normal.      Nose: Nose normal.   Eyes:      General:         Right eye: No discharge.          Left eye: No discharge.   Cardiovascular:      Rate and Rhythm: Normal rate and regular rhythm.      Pulses: Normal pulses.      Heart sounds: Normal heart sounds.   Pulmonary:      Effort: Pulmonary effort is normal.      Breath sounds: Normal breath sounds.   Abdominal:      General: Abdomen is flat. There is no distension.      Palpations: Abdomen is soft. There is no mass.      Tenderness: There is no abdominal tenderness. There is no guarding or rebound.      Hernia: No hernia is present.   Musculoskeletal:      Right lower leg: No edema.      Left lower leg: No edema.   Skin:     General: Skin is warm and dry.   Neurological:      Mental Status: He is alert and oriented to person, place, and time.   Psychiatric:         Mood and Affect: Mood normal.         Behavior: Behavior normal.          Significant Labs:   CBC:   Recent Labs   Lab 07/08/24  1054 07/09/24  0708 07/10/24  0516 07/10/24  0931   WBC 3.62* 5.64 3.64*  --    HGB 8.1* 7.7* 6.2*  --    HCT 27.9* 27.6* 21.4* 20.2*    292 183  --        Diagnostic Results:  I have reviewed and interpreted all pertinent imaging results/findings within the past 24 hours.  Assessment/Plan:     * Lower GI bleed  74 YOM w/transverse colon CA w/mets to the liver s/p cycle 27 on 7/8/24 with FOLFIRI plus avastin (held 7/8/24) and recent diverticular GIB who presents for GIB with a 1x BRBPR associated with N/V (unlikely hematemesis). The patient was admitted 6/2024 for a similar presentation and was 2/2 to diverticular bleed and was managed conservatively. He has been hemodynamically stable and has had stable hemoglobin. CTA unremarkable for active GI bleed with continued diverticulosis.     Colonoscopy (7/10)   Impression:            - Patent end-to-side ileo-colonic anastomosis,                          characterized by healthy appearing mucosa.                          - The examined portion of the ileum was normal.                          - Diverticulosis in  the sigmoid colon, in the                          descending colon and in the transverse colon.                          - Non-bleeding internal hemorrhoids.                          - No specimens collected.     Plan   -  Resume regular diet.   - Switch to home dose twice daily PPI   - Repeat colonoscopy in 3 years for surveillance.   - appreciate GI recs   - transfuse hbg <7               Metastatic colon cancer to liver  Metastatic CRC w/ mets to liver. Under the care of an outpatient oncologist, Dr. Dykes. Treatment has included 42 cycles of FOLFOX and current therapy of FOLFIRI + Avastin cycle 27.     Plan:   - trend LFTs    Microcytic anemia  Hx of microcytic anemia in setting of possible GI bleed.     Plan:  - CBC q8H to trend  - transfuse hgb <7    Liver masses  See metastatic colon cancer to liver              Mohamud Mcgovern MD  Hematology/Oncology  [facility] ([facility])

## 2024-07-10 NOTE — NURSING TRANSFER
..Nursing Transfer Note      Reason patient is being transferred: post procedure    Transfer To: 807    Transfer via stretcher    Transfer with cardiac monitoring and continuous pulse ox    Transported by pct    Medicines sent: none    Any special needs or follow-up needed: routine    Chart send with patient: Yes    Notified: daughter    Patient reassessed at: 2794 7/10/2024

## 2024-07-11 VITALS
BODY MASS INDEX: 20.5 KG/M2 | HEIGHT: 67 IN | TEMPERATURE: 98 F | HEART RATE: 71 BPM | DIASTOLIC BLOOD PRESSURE: 61 MMHG | WEIGHT: 130.63 LBS | SYSTOLIC BLOOD PRESSURE: 107 MMHG | OXYGEN SATURATION: 100 % | RESPIRATION RATE: 12 BRPM

## 2024-07-11 LAB
ALBUMIN SERPL BCP-MCNC: 2.5 G/DL (ref 3.5–5.2)
ALP SERPL-CCNC: 86 U/L (ref 55–135)
ALT SERPL W/O P-5'-P-CCNC: 18 U/L (ref 10–44)
ANION GAP SERPL CALC-SCNC: 4 MMOL/L (ref 8–16)
AST SERPL-CCNC: 28 U/L (ref 10–40)
BASOPHILS # BLD AUTO: 0.01 K/UL (ref 0–0.2)
BASOPHILS NFR BLD: 0.5 % (ref 0–1.9)
BILIRUB DIRECT SERPL-MCNC: 0.5 MG/DL (ref 0.1–0.3)
BILIRUB SERPL-MCNC: 3.4 MG/DL (ref 0.1–1)
BLD PROD TYP BPU: NORMAL
BLOOD UNIT EXPIRATION DATE: NORMAL
BLOOD UNIT TYPE CODE: 6200
BLOOD UNIT TYPE: NORMAL
BUN SERPL-MCNC: 16 MG/DL (ref 8–23)
CALCIUM SERPL-MCNC: 8.2 MG/DL (ref 8.7–10.5)
CHLORIDE SERPL-SCNC: 109 MMOL/L (ref 95–110)
CO2 SERPL-SCNC: 25 MMOL/L (ref 23–29)
CODING SYSTEM: NORMAL
CREAT SERPL-MCNC: 0.8 MG/DL (ref 0.5–1.4)
CROSSMATCH INTERPRETATION: NORMAL
DIFFERENTIAL METHOD BLD: ABNORMAL
DISPENSE STATUS: NORMAL
EOSINOPHIL # BLD AUTO: 0 K/UL (ref 0–0.5)
EOSINOPHIL NFR BLD: 0.5 % (ref 0–8)
ERYTHROCYTE [DISTWIDTH] IN BLOOD BY AUTOMATED COUNT: 18.6 % (ref 11.5–14.5)
EST. GFR  (NO RACE VARIABLE): >60 ML/MIN/1.73 M^2
GLUCOSE SERPL-MCNC: 91 MG/DL (ref 70–110)
HCT VFR BLD AUTO: 22.1 % (ref 40–54)
HGB BLD-MCNC: 6.7 G/DL (ref 14–18)
IMM GRANULOCYTES # BLD AUTO: 0 K/UL (ref 0–0.04)
IMM GRANULOCYTES NFR BLD AUTO: 0 % (ref 0–0.5)
LYMPHOCYTES # BLD AUTO: 0.4 K/UL (ref 1–4.8)
LYMPHOCYTES NFR BLD: 20.9 % (ref 18–48)
MAGNESIUM SERPL-MCNC: 2 MG/DL (ref 1.6–2.6)
MCH RBC QN AUTO: 24.4 PG (ref 27–31)
MCHC RBC AUTO-ENTMCNC: 30.3 G/DL (ref 32–36)
MCV RBC AUTO: 80 FL (ref 82–98)
MONOCYTES # BLD AUTO: 0.1 K/UL (ref 0.3–1)
MONOCYTES NFR BLD: 5.2 % (ref 4–15)
NEUTROPHILS # BLD AUTO: 1.5 K/UL (ref 1.8–7.7)
NEUTROPHILS NFR BLD: 72.9 % (ref 38–73)
NRBC BLD-RTO: 0 /100 WBC
NUM UNITS TRANS PACKED RBC: NORMAL
PHOSPHATE SERPL-MCNC: 3.1 MG/DL (ref 2.7–4.5)
PLATELET # BLD AUTO: 154 K/UL (ref 150–450)
PMV BLD AUTO: 11.6 FL (ref 9.2–12.9)
POTASSIUM SERPL-SCNC: 3.7 MMOL/L (ref 3.5–5.1)
PROT SERPL-MCNC: 5 G/DL (ref 6–8.4)
RBC # BLD AUTO: 2.75 M/UL (ref 4.6–6.2)
SODIUM SERPL-SCNC: 138 MMOL/L (ref 136–145)
WBC # BLD AUTO: 2.11 K/UL (ref 3.9–12.7)

## 2024-07-11 PROCEDURE — 83735 ASSAY OF MAGNESIUM: CPT | Performed by: STUDENT IN AN ORGANIZED HEALTH CARE EDUCATION/TRAINING PROGRAM

## 2024-07-11 PROCEDURE — 36430 TRANSFUSION BLD/BLD COMPNT: CPT

## 2024-07-11 PROCEDURE — 1111F DSCHRG MED/CURRENT MED MERGE: CPT | Mod: CPTII,,, | Performed by: HOSPITALIST

## 2024-07-11 PROCEDURE — 84100 ASSAY OF PHOSPHORUS: CPT | Performed by: STUDENT IN AN ORGANIZED HEALTH CARE EDUCATION/TRAINING PROGRAM

## 2024-07-11 PROCEDURE — 85025 COMPLETE CBC W/AUTO DIFF WBC: CPT | Performed by: STUDENT IN AN ORGANIZED HEALTH CARE EDUCATION/TRAINING PROGRAM

## 2024-07-11 PROCEDURE — 63600175 PHARM REV CODE 636 W HCPCS

## 2024-07-11 PROCEDURE — 25000003 PHARM REV CODE 250: Performed by: HOSPITALIST

## 2024-07-11 PROCEDURE — 25000003 PHARM REV CODE 250: Performed by: STUDENT IN AN ORGANIZED HEALTH CARE EDUCATION/TRAINING PROGRAM

## 2024-07-11 PROCEDURE — 86920 COMPATIBILITY TEST SPIN: CPT

## 2024-07-11 PROCEDURE — 99239 HOSP IP/OBS DSCHRG MGMT >30: CPT | Mod: ,,, | Performed by: HOSPITALIST

## 2024-07-11 PROCEDURE — 94761 N-INVAS EAR/PLS OXIMETRY MLT: CPT

## 2024-07-11 PROCEDURE — 80053 COMPREHEN METABOLIC PANEL: CPT | Performed by: STUDENT IN AN ORGANIZED HEALTH CARE EDUCATION/TRAINING PROGRAM

## 2024-07-11 PROCEDURE — P9016 RBC LEUKOCYTES REDUCED: HCPCS

## 2024-07-11 PROCEDURE — 20600001 HC STEP DOWN PRIVATE ROOM

## 2024-07-11 PROCEDURE — 25000003 PHARM REV CODE 250

## 2024-07-11 PROCEDURE — 82248 BILIRUBIN DIRECT: CPT

## 2024-07-11 RX ORDER — HEPARIN 100 UNIT/ML
500 SYRINGE INTRAVENOUS
Status: DISCONTINUED | OUTPATIENT
Start: 2024-07-11 | End: 2024-07-11 | Stop reason: HOSPADM

## 2024-07-11 RX ORDER — SODIUM CHLORIDE 0.9 % (FLUSH) 0.9 %
20 SYRINGE (ML) INJECTION
Status: DISCONTINUED | OUTPATIENT
Start: 2024-07-11 | End: 2024-07-11 | Stop reason: HOSPADM

## 2024-07-11 RX ORDER — SODIUM CHLORIDE 0.9 % (FLUSH) 0.9 %
10 SYRINGE (ML) INJECTION
Status: DISCONTINUED | OUTPATIENT
Start: 2024-07-11 | End: 2024-07-11 | Stop reason: HOSPADM

## 2024-07-11 RX ORDER — HYDROCODONE BITARTRATE AND ACETAMINOPHEN 500; 5 MG/1; MG/1
TABLET ORAL
Status: DISCONTINUED | OUTPATIENT
Start: 2024-07-11 | End: 2024-07-11 | Stop reason: HOSPADM

## 2024-07-11 RX ADMIN — PANTOPRAZOLE SODIUM 40 MG: 40 TABLET, DELAYED RELEASE ORAL at 08:07

## 2024-07-11 RX ADMIN — TAMSULOSIN HYDROCHLORIDE 0.4 MG: 0.4 CAPSULE ORAL at 08:07

## 2024-07-11 RX ADMIN — AMLODIPINE BESYLATE 10 MG: 10 TABLET ORAL at 08:07

## 2024-07-11 RX ADMIN — HEPARIN 500 UNITS: 100 SYRINGE at 01:07

## 2024-07-11 RX ADMIN — SODIUM CHLORIDE: 9 INJECTION, SOLUTION INTRAVENOUS at 06:07

## 2024-07-11 NOTE — DISCHARGE SUMMARY
[facility] - Oncology ([facility])  Hematology/Oncology  Discharge Summary      Patient Name: Javier Downs  MRN: [id number]  Admission Date: 7/9/2024  Hospital Length of Stay: 0 days  Discharge Date and Time:  07/11/2024 12:43 PM  Attending Physician: Charley Gambino MD   Discharging Provider: Mohamud Mcgovern MD  Primary Care Provider: [facility]    HPI: Ms. 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 He followed by Dr. Dykes presents with abd pain, N/V, bloody stools. He describes bowel movement was initially dark but progressed to bright red blood which was followed by 3 episodes of vomiting. He describes one small blood clot in his vomit but denies blood. His nausea did not improve with Zofran. In addition, he complains of night sweats, and abdominal pain which are typical for him after chemotherapy. Denies NSAID, alcohol and tobacco use.      In the ED the patient was given 1 L IVF bolus and started on PPI bolus. Of note the patient was seen 6/24 for a GIB which was thought to be diverticular and hb was stable so the patient was discharged.     Procedure(s) (LRB):  COLONOSCOPY (N/A)     Hospital Course: Pt reported 1 episode of soft, black, dark/red blood in stool prior. Received 1 unit pRBC; Hgb 7.7 >6.2. Completed 1/2 bowel prep. Colonoscopy (7/10) revealed no active areas of bleeding. Recommended resume diet and continue PPI BID and repeat colonoscopy in 3 years. Received 2 unit of pRBC after 7.2 > 6.7 drop in Hgb. No repeat of acute episodes of bleeding. Hgb stable. Will follow up in clinic 7/15 to check labs and see need for additional transfusion. Discussed future chemotherapy treatment with Dr. Schuster; will discontinue avastin therapy.       Goals of Care Treatment Preferences:  Code Status: Full Code      Consults:   Consults (From admission, onward)          Status  Ordering Provider     Inpatient consult to Gastroenterology  Once        Provider:  (Not yet assigned)    Completed LUDWIN BUSTILLOS            Significant Diagnostic Studies: Labs: CMP   Recent Labs   Lab 07/10/24  0516 07/11/24  0435    138   K 4.6 3.7   * 109   CO2 22* 25    91   BUN 19 16   CREATININE 0.9 0.8   CALCIUM 8.6* 8.2*   PROT 5.4* 5.0*   ALBUMIN 2.8* 2.5*   BILITOT 1.4* 3.4*   ALKPHOS 99 86   AST 25 28   ALT 16 18   ANIONGAP 7* 4*    and CBC   Recent Labs   Lab 07/10/24  0516 07/10/24  0931 07/10/24  1759 07/11/24  0435   WBC 3.64*  --   --  2.11*   HGB 6.2*  --  7.2* 6.7*   HCT 21.4*   < >  --  22.1*     --   --  154    < > = values in this interval not displayed.       Pending Diagnostic Studies:       None          Final Active Diagnoses:    Diagnosis Date Noted POA    PRINCIPAL PROBLEM:  Lower GI bleed [K92.2] 06/25/2024 Yes    Metastatic colon cancer to liver [C18.9, C78.7] 04/22/2021 Yes    Liver masses [R16.0] 04/17/2021 Yes    Microcytic anemia [D50.9]  Yes      Problems Resolved During this Admission:      Discharged Condition: stable    Disposition: Home or Self Care    Follow Up:    Patient Instructions:   No discharge procedures on file.  Medications:  Reconciled Home Medications:      Medication List        CONTINUE taking these medications      acetaminophen 500 MG tablet  Commonly known as: TYLENOL  Take 1 tablet (500 mg total) by mouth every 6 (six) hours as needed for Pain (alternate with ibuprofen).     amLODIPine 10 MG tablet  Commonly known as: NORVASC  Take 1 tablet (10 mg total) by mouth once daily.     LIDOcaine-prilocaine cream  Commonly known as: EMLA  Apply topically as needed (port application).     ondansetron 4 MG tablet  Commonly known as: ZOFRAN  Take 1 tablet (4 mg total) by mouth every 8 (eight) hours as needed for Nausea.     oxyCODONE 5 MG immediate release tablet  Commonly known as: ROXICODONE  Take 1 tablet (5 mg total) by mouth every 6  (six) hours as needed for Pain.     pantoprazole 40 MG tablet  Commonly known as: PROTONIX  Take 1 tablet (40 mg total) by mouth 2 (two) times daily before meals.     tamsulosin 0.4 mg Cap  Commonly known as: FLOMAX  Take 1 capsule (0.4 mg total) by mouth once daily.              Mohamud Mcgovern MD  Hematology/Oncology  [facility] - Oncology ([facility])

## 2024-07-11 NOTE — PLAN OF CARE
Assumed care of pt  [id number]  Patient involved in plan of care and communication needs throughout shift        -Dx: Lower GI bleed  -AAOx4  -Denies pain this shift  -Ambulates independently in room   -Regular diet  -Remains afebrile  -Voids via urinal at bedside  -No BM this shift and no bleeding   -RA; Sinus tach on telemonitor   -Skin intact               -q 2 hour patient rounds. VSS , no acute events so far this shift.  Non-skid soaks when out of bed. Bed locked and in lowest position. Call light within reach as well as all belongings. Instructed to call for assistance when  needed.  verbalized understanding. Will continue to monitor.

## 2024-07-11 NOTE — PROGRESS NOTES
Admit Assessment    Patient Identification  Javier Downs   :  1950  Admit Date:  2024  Attending Provider:  Charley Gambino MD              Referral:   Pt was admitted to [facility] with a diagnosis of Lower GI bleed, and was admitted this hospital stay due to Rectal bleeding [K62.5]  Lower GI bleed [K92.2]  Chest pain [R07.9].   is involved was referred to the Social Work Department via (Routine referral).      Patient presents as a 74 y.o. year old  male. Patient reports he and wife have been  for over 25 years. He has three children, 2 sons and one daughter who all live locally in [place], [place].    Persons interviewed: Patient. Patient is alert and able to participate in a meaningful conversation. Some forgetfulness observed as patient was unable to recall his address, but seemed to remember when SW stated name of street.     Living Situation:    Resides at [address] [place] [address] [address] [address]   Phone: [phone] (home).    Patient reports he lives with his wife and daughter. Upon discharge, he states his daughter Carrie will be picking him up.     (RETIRED) Functional Status Prior  Ambulation Prior: 0-->independent  Transferrin-->independent  Toiletin-->independent  Bathin-->independent  Dressin-->independent  Eatin-->independent  Communication: understands/communicates without difficulty  Swallowing: swallows foods/liquids without difficulty    Current or Past Agencies and Description of Services/Supplies    DME  Equipment Currently Used at Home: none    Home Health  None    IV Infusion  None    Nutrition: Regular    Outpatient Pharmacy:     [facility]  [address] [facility] [address]  Phone: [phone] Fax: [phone]    [facility]  [address]  [place] [address]  Phone: [phone] Fax: [phone]      Patient Preference of agencies include: N/A    Patient/Caregiver  informed of right to choose providers or agencies.  Patient provides permission to release any necessary information to [facility] and to Non-[facility] agencies as needed to facilitate patient care, treatment planning, and patient discharge planning.  Written and verbal resources provided.      Coping  Patient appears to be coping appropriately with cancer diagnosis and current situation. 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     Adjustment to Diagnosis and Treatment  Patient adjusting appropriately.     Emotional/Behavioral/Cognitive Issues  No concerns noted, patient displays with warm and pleasant demeanor.     History/Current Symptoms of Anxiety/Depression: No:   History/Current Substance Use:   Social History     Tobacco Use    Smoking status: Never    Smokeless tobacco: Never   Substance and Sexual Activity    Alcohol use: Not Currently    Drug use: Never    Sexual activity: Not Currently     Partners: Female       Indications of Abuse/Neglect: No:   Abuse Screen (yes response referral indicated)  Feels Unsafe at Home or Work/School: no  Feels Threatened by Someone: no  Does anyone try to keep you from having contact with others or doing things outside your home?: no  Physical Signs of Abuse Present: no    Financial:  Payer/Plan Subscr  Sex Relation Sub. Ins. ID Effective Group Num   1. [employer] MANAGE* TERRANCE VAZQUEZ 1950 Male Self [id number]                                   [address] [address]   2. [place]* MARIA LUISANEHALTERRANCE BERMUDEZ 1950 Male Self [id number]* 22                                    [address] [address]       Plan: Discharge today, no home needs identified.     Patient/caregiver engaged in treatment planning process.     providing psychosocial and supportive counseling, resources, education, assistance and discharge planning as appropriate.  Patient/caregiver state understanding of  available  resources,  following, remains available.    Charisse Watkins MSW, [facility]  Oncology Social Worker  [facility]   [phone]

## 2024-07-11 NOTE — PLAN OF CARE
Discharged to home per MD order. Discharge instructions given and explained to pt, voiced understanding, with ARACELIS Marrero.Tolerating regular diet; no n/v.Voiding without difficulty,no bloody stools this shift, denies pain. All VSS; on RA.Left floor by wheelchair with tech, all belongings with pt.

## 2024-07-11 NOTE — ANESTHESIA POSTPROCEDURE EVALUATION
Anesthesia Post Evaluation    Patient: Javier Downs    Procedure(s) Performed: Procedure(s) (LRB):  COLONOSCOPY (N/A)    Final Anesthesia Type: general      Patient location during evaluation: PACU  Patient participation: Yes- Able to Participate  Level of consciousness: awake and alert  Post-procedure vital signs: reviewed and stable  Pain management: adequate  Airway patency: patent    PONV status at discharge: No PONV  Anesthetic complications: no      Cardiovascular status: blood pressure returned to baseline  Respiratory status: unassisted  Hydration status: euvolemic  Follow-up not needed.              Vitals Value Taken Time   /61 07/11/24 1154   Temp 36.9 °C (98.4 °F) 07/11/24 1154   Pulse 71 07/11/24 1154   Resp 12 07/11/24 1154   SpO2 100 % 07/11/24 1154         Event Time   Out of Recovery 14:56:00         Pain/Jill Score: Jill Score: 10 (7/10/2024  2:30 PM)

## 2024-07-11 NOTE — SUBJECTIVE & OBJECTIVE
Interval History: NAEON. VSS. AF. Pt is doing well after colonoscopy. Resumed regular diet. Denies N/V. Reports no recurrence of dark stool. Receiving 2nd unit of pRBC after Hgb drop 7.2 > 6.2. Bilirubin rise 1.4 >3.4.     Oncology Treatment Plan:   OP COLORECTAL FOLFIRI + BEVACIZUMAB Q2W    Medications:  Continuous Infusions:  Scheduled Meds:   amLODIPine  10 mg Oral Daily    pantoprazole  40 mg Oral BID AC    tamsulosin  0.4 mg Oral Daily     PRN Meds:  Current Facility-Administered Medications:     0.9%  NaCl infusion (for blood administration), , Intravenous, Q24H PRN    0.9%  NaCl infusion (for blood administration), , Intravenous, Q24H PRN    acetaminophen, 650 mg, Oral, Q4H PRN    dextrose 10%, 12.5 g, Intravenous, PRN    dextrose 10%, 12.5 g, Intravenous, PRN    dextrose 10%, 25 g, Intravenous, PRN    dextrose 10%, 25 g, Intravenous, PRN    glucagon (human recombinant), 1 mg, Intramuscular, PRN    glucagon (human recombinant), 1 mg, Intramuscular, PRN    glucose, 16 g, Oral, PRN    glucose, 24 g, Oral, PRN    naloxone, 0.02 mg, Intravenous, PRN    ondansetron, 4 mg, Intravenous, Q8H PRN    oxyCODONE, 5 mg, Oral, Q6H PRN    sodium chloride 0.9%, 10 mL, Intravenous, Q12H PRN     Review of Systems   Constitutional:  Negative for chills, fatigue, fever and unexpected weight change.   HENT:  Negative for mouth sores and sore throat.    Eyes:  Negative for visual disturbance.   Respiratory:  Negative for cough, shortness of breath and wheezing.    Cardiovascular:  Negative for chest pain, palpitations and leg swelling.   Gastrointestinal:  Negative for abdominal pain, anal bleeding, blood in stool, diarrhea, nausea and vomiting.   Genitourinary:  Negative for difficulty urinating.   Musculoskeletal:  Negative for arthralgias, joint swelling and myalgias.   Skin:  Negative for pallor and rash.   Neurological:  Negative for dizziness, light-headedness and headaches.   Hematological:  Negative for adenopathy.      Objective:     Vital Signs (Most Recent):  Temp: 98.3 °F (36.8 °C) (07/11/24 0746)  Pulse: 73 (07/11/24 0746)  Resp: 16 (07/11/24 0746)  BP: 115/66 (07/11/24 0746)  SpO2: 100 % (07/11/24 0746) Vital Signs (24h Range):  Temp:  [97.3 °F (36.3 °C)-98.4 °F (36.9 °C)] 98.3 °F (36.8 °C)  Pulse:  [65-86] 73  Resp:  [13-19] 16  SpO2:  [97 %-100 %] 100 %  BP: (103-119)/(61-70) 115/66     Weight: 59.2 kg (130 lb 10 oz)  Body mass index is 20.46 kg/m².  Body surface area is 1.67 meters squared.      Intake/Output Summary (Last 24 hours) at 7/11/2024 0804  Last data filed at 7/11/2024 0619  Gross per 24 hour   Intake 1244 ml   Output 200 ml   Net 1044 ml        Physical Exam  Constitutional:       Appearance: Normal appearance.   HENT:      Head: Normocephalic and atraumatic.      Right Ear: External ear normal.      Left Ear: External ear normal.      Nose: Nose normal.   Eyes:      General:         Right eye: No discharge.         Left eye: No discharge.   Cardiovascular:      Rate and Rhythm: Normal rate and regular rhythm.      Pulses: Normal pulses.      Heart sounds: Normal heart sounds.   Pulmonary:      Effort: Pulmonary effort is normal.      Breath sounds: Normal breath sounds.   Abdominal:      General: Abdomen is flat. There is no distension.      Palpations: Abdomen is soft. There is no mass.      Tenderness: There is no abdominal tenderness. There is no guarding or rebound.      Hernia: No hernia is present.   Musculoskeletal:      Right lower leg: No edema.      Left lower leg: No edema.   Skin:     General: Skin is warm and dry.   Neurological:      Mental Status: He is alert and oriented to person, place, and time.   Psychiatric:         Mood and Affect: Mood normal.         Behavior: Behavior normal.          Significant Labs:   CBC:   Recent Labs   Lab 07/10/24  0516 07/10/24  0931 07/10/24  1759 07/11/24  0435   WBC 3.64*  --   --  2.11*   HGB 6.2*  --  7.2* 6.7*   HCT 21.4* 20.2*  --  22.1*      --   --  154       Diagnostic Results:  I have reviewed and interpreted all pertinent imaging results/findings within the past 24 hours.

## 2024-07-15 ENCOUNTER — HOSPITAL ENCOUNTER (EMERGENCY)
Facility: HOSPITAL | Age: 74
Discharge: HOME | End: 2024-07-15
Attending: EMERGENCY MEDICINE
Payer: MEDICARE

## 2024-07-15 VITALS
SYSTOLIC BLOOD PRESSURE: 104 MMHG | DIASTOLIC BLOOD PRESSURE: 65 MMHG | HEART RATE: 72 BPM | HEIGHT: 67 IN | WEIGHT: 145 LBS | OXYGEN SATURATION: 100 % | BODY MASS INDEX: 22.76 KG/M2 | TEMPERATURE: 99 F | RESPIRATION RATE: 16 BRPM

## 2024-07-15 DIAGNOSIS — K92.2 GASTROINTESTINAL HEMORRHAGE, UNSPECIFIED GASTROINTESTINAL HEMORRHAGE TYPE: Primary | ICD-10-CM

## 2024-07-15 LAB
ANISOCYTOSIS BLD QL SMEAR: ABNORMAL
BASOPHILS # BLD AUTO: ABNORMAL K/UL (ref 0–0.2)
BASOPHILS NFR BLD: 2 % (ref 0–1.9)
DACRYOCYTES BLD QL SMEAR: ABNORMAL
DIFFERENTIAL METHOD BLD: ABNORMAL
EOSINOPHIL # BLD AUTO: ABNORMAL K/UL (ref 0–0.5)
EOSINOPHIL NFR BLD: 7 % (ref 0–8)
ERYTHROCYTE [DISTWIDTH] IN BLOOD BY AUTOMATED COUNT: 18.9 % (ref 11.5–14.5)
GIANT PLATELETS BLD QL SMEAR: PRESENT
HCT VFR BLD AUTO: 28.5 % (ref 40–54)
HGB BLD-MCNC: 8.4 G/DL (ref 14–18)
HYPOCHROMIA BLD QL SMEAR: ABNORMAL
IMM GRANULOCYTES # BLD AUTO: ABNORMAL K/UL (ref 0–0.04)
IMM GRANULOCYTES NFR BLD AUTO: ABNORMAL % (ref 0–0.5)
LYMPHOCYTES # BLD AUTO: ABNORMAL K/UL (ref 1–4.8)
LYMPHOCYTES NFR BLD: 30 % (ref 18–48)
MCH RBC QN AUTO: 24.7 PG (ref 27–31)
MCHC RBC AUTO-ENTMCNC: 29.5 G/DL (ref 32–36)
MCV RBC AUTO: 84 FL (ref 82–98)
MONOCYTES # BLD AUTO: ABNORMAL K/UL (ref 0.3–1)
MONOCYTES NFR BLD: 17 % (ref 4–15)
NEUTROPHILS # BLD AUTO: ABNORMAL K/UL (ref 1.8–7.7)
NEUTROPHILS NFR BLD: 43 % (ref 38–73)
NEUTS BAND NFR BLD MANUAL: 1 %
NRBC BLD-RTO: 0 /100 WBC
OVALOCYTES BLD QL SMEAR: ABNORMAL
PLATELET # BLD AUTO: 122 K/UL (ref 150–450)
PLATELET BLD QL SMEAR: ABNORMAL
PMV BLD AUTO: 11.4 FL (ref 9.2–12.9)
POIKILOCYTOSIS BLD QL SMEAR: SLIGHT
POLYCHROMASIA BLD QL SMEAR: ABNORMAL
RBC # BLD AUTO: 3.4 M/UL (ref 4.6–6.2)
SCHISTOCYTES BLD QL SMEAR: ABNORMAL
SCHISTOCYTES BLD QL SMEAR: PRESENT
WBC # BLD AUTO: 1.29 K/UL (ref 3.9–12.7)

## 2024-07-15 PROCEDURE — 85027 COMPLETE CBC AUTOMATED: CPT | Performed by: EMERGENCY MEDICINE

## 2024-07-15 PROCEDURE — 85007 BL SMEAR W/DIFF WBC COUNT: CPT | Performed by: EMERGENCY MEDICINE

## 2024-07-15 PROCEDURE — 99283 EMERGENCY DEPT VISIT LOW MDM: CPT

## 2024-07-15 NOTE — ED TRIAGE NOTES
Javier Downs, a 74 y.o. male presents to the ED w/ complaint of pt states that he came to the ED for a follow up visit. Pt was recently seen for a rectal bleed on the 11 and wants to make everything is okay. Pt has Hx of liver cancer. Last chemo tx was 07/08. Pt denies any blood in stool and has no com plaints at this time.     Triage note:  Chief Complaint   Patient presents with    Multiple complaints     States I'm here for follow visit, dc'd on the 11 with rectal bleed, denies any symptoms, no black or bloody stools, last chemo July 8     Review of patient's allergies indicates:  No Known Allergies  Past Medical History:   Diagnosis Date    MARIA A (acute kidney injury) 8/18/2022    Hypertension     Metastatic colon cancer to liver 4/22/2021

## 2024-07-15 NOTE — ED PROVIDER NOTES
Encounter Date: 7/15/2024       History     Chief Complaint   Patient presents with    Multiple complaints     States I'm here for follow visit, dc'd on the 11 with rectal bleed, denies any symptoms, no black or bloody stools, last chemo      74-year-old male with history of colon cancer presents 1 week after a GI bleed.  He was in the hospital for passing clot per rectum.  It was thought to be a diverticular bleed.  He got 2 units of blood transfused.  His hemoglobin went up to 7.2 and then back down to 6.7.  He was discharged and instructed to return to the clinic for repeat labs on .  He came to the ED today to have his labs repeated.  He denies any abdominal pain weakness chest pain shortness of breath or anymore bleeding..        Review of patient's allergies indicates:  No Known Allergies  Past Medical History:   Diagnosis Date    MARIA A (acute kidney injury) 2022    Hypertension     Metastatic colon cancer to liver 2021     Past Surgical History:   Procedure Laterality Date    COLONOSCOPY N/A 2021    Procedure: COLONOSCOPY with possible stent;  Surgeon: EDILMA Bonilla MD;  Location: [facility] (2ND FLR);  Service: Colon and Rectal;  Laterality: N/A;    COLONOSCOPY N/A 11/3/2023    Procedure: COLONOSCOPY;  Surgeon: EDILMA Bonilla MD;  Location: [facility] (4TH FLR);  Service: Endoscopy;  Laterality: N/A;  prep instructions sent to pt via portal  From: EDILMA Bonilla MD  Procedure: Colonoscopy  Diagnosis: Surveillance colonoscopy - Hx of colon cancer  Procedure Timin-12 weeks  Provider: Myself  [facility]  Additional Scheduling Information: No scheduling con    COLONOSCOPY N/A 7/10/2024    Procedure: COLONOSCOPY;  Surgeon: Tam Pantoja MD;  Location: [facility] (2ND FLR);  Service: Endoscopy;  Laterality: N/A;    DIAGNOSTIC LAPAROSCOPY N/A 2022    Procedure: LAPAROSCOPY, DIAGNOSTIC;  Surgeon: Adrian Rodriguez MD;  [facility];  Service: General;   Laterality: N/A;    INSERTION OF TUNNELED CENTRAL VENOUS CATHETER (CVC) WITH SUBCUTANEOUS PORT N/A 5/3/2021    Procedure: [id number]-[id number];  Surgeon: Francisco Layton MD;  Location: [facility] OR 2ND FLR;  Service: Vascular;  Laterality: N/A;    OMENTECTOMY N/A 10/20/2022    Procedure: OMENTECTOMY;  Surgeon: EDILMA Bonilla MD;  Location: [facility] OR 2ND FLR;  Service: Colon and Rectal;  Laterality: N/A;    RIGHT HEMICOLECTOMY N/A 10/20/2022    Procedure: HEMICOLECTOMY, RIGHT, extended;  Surgeon: EDILMA Bonilla MD;  Location: [facility] OR 2ND FLR;  Service: Colon and Rectal;  Laterality: N/A;  Extended right hemicolectomy CONSENT IN AM     Family History   Problem Relation Name Age of Onset    Cancer Brother       Social History     Tobacco Use    Smoking status: Never    Smokeless tobacco: Never   Substance Use Topics    Alcohol use: Not Currently    Drug use: Never     Review of Systems    Physical Exam     Initial Vitals [07/15/24 1408]   BP Pulse Resp Temp SpO2   107/77 92 18 98.6 °F (37 °C) 100 %      MAP       --         Physical Exam    HENT:   Head: Normocephalic and atraumatic.   Neck: Neck supple.   Pulmonary/Chest: No respiratory distress.   Abdominal: Abdomen is soft. There is no abdominal tenderness.   Musculoskeletal:      Cervical back: Neck supple.           ED Course   Procedures  Labs Reviewed   CBC W/ AUTO DIFFERENTIAL - Abnormal; Notable for the following components:       Result Value    WBC 1.29 (*)     RBC 3.40 (*)     Hemoglobin 8.4 (*)     Hematocrit 28.5 (*)     MCH 24.7 (*)     MCHC 29.5 (*)     RDW 18.9 (*)     Platelets 122 (*)     Mono % 17.0 (*)     Basophil % 2.0 (*)     Platelet Estimate Decreased (*)     All other components within normal limits    Narrative:     WBC    critical result(s) called and verbal readback obtained from   LIMA DUPREE RN by MRV1 07/15/2024 15:49          Imaging Results    None          Medications - No data to display  Medical Decision Making  Patient  presents with follow-up for GI bleed.  He is asymptomatic.  His hemoglobin is 8.  He is borderline neutropenic with an ANC of 540.  Discussed with heme Onc fellow.  He has not having any infectious symptoms.  Will discharge home in stable condition.    Amount and/or Complexity of Data Reviewed  Labs: ordered.                                      Clinical Impression:  Final diagnoses:  [K92.2] Gastrointestinal hemorrhage, unspecified gastrointestinal hemorrhage type (Primary)          ED Disposition Condition    Discharge Stable          ED Prescriptions    None       Follow-up Information       Follow up With Specialties Details Why Contact Info    [facility] - Emergency Dept Emergency Medicine  If symptoms worsen [address]  [facility] [id number] [phone]             Andre Thompson MD  07/15/24 8758

## 2024-07-23 ENCOUNTER — OFFICE VISIT (OUTPATIENT)
Dept: HEMATOLOGY/ONCOLOGY | Facility: CLINIC | Age: 74
End: 2024-07-23
Payer: MEDICARE

## 2024-07-23 ENCOUNTER — INFUSION (OUTPATIENT)
Dept: INFUSION THERAPY | Facility: HOSPITAL | Age: 74
End: 2024-07-23
Payer: MEDICARE

## 2024-07-23 ENCOUNTER — LAB VISIT (OUTPATIENT)
Dept: LAB | Facility: HOSPITAL | Age: 74
End: 2024-07-23
Payer: MEDICARE

## 2024-07-23 VITALS
TEMPERATURE: 98 F | DIASTOLIC BLOOD PRESSURE: 63 MMHG | HEART RATE: 66 BPM | RESPIRATION RATE: 18 BRPM | SYSTOLIC BLOOD PRESSURE: 113 MMHG

## 2024-07-23 VITALS
OXYGEN SATURATION: 100 % | HEART RATE: 86 BPM | BODY MASS INDEX: 20.72 KG/M2 | WEIGHT: 132.25 LBS | DIASTOLIC BLOOD PRESSURE: 61 MMHG | SYSTOLIC BLOOD PRESSURE: 100 MMHG

## 2024-07-23 DIAGNOSIS — C78.7 METASTATIC COLON CANCER TO LIVER: ICD-10-CM

## 2024-07-23 DIAGNOSIS — R52 PAIN: ICD-10-CM

## 2024-07-23 DIAGNOSIS — C18.9 METASTATIC COLON CANCER TO LIVER: Primary | ICD-10-CM

## 2024-07-23 DIAGNOSIS — C78.7 METASTATIC COLON CANCER TO LIVER: Primary | ICD-10-CM

## 2024-07-23 DIAGNOSIS — R63.4 WEIGHT DECREASE: ICD-10-CM

## 2024-07-23 DIAGNOSIS — C18.9 METASTATIC COLON CANCER TO LIVER: ICD-10-CM

## 2024-07-23 DIAGNOSIS — D49.9 IMMUNODEFICIENCY SECONDARY TO NEOPLASM: ICD-10-CM

## 2024-07-23 DIAGNOSIS — D84.821 IMMUNODEFICIENCY DUE TO CHEMOTHERAPY: ICD-10-CM

## 2024-07-23 DIAGNOSIS — E87.5 HYPERKALEMIA: ICD-10-CM

## 2024-07-23 DIAGNOSIS — K92.2 LOWER GI BLEED: ICD-10-CM

## 2024-07-23 DIAGNOSIS — I10 HYPERTENSION, UNSPECIFIED TYPE: ICD-10-CM

## 2024-07-23 DIAGNOSIS — D64.81 ANEMIA ASSOCIATED WITH CHEMOTHERAPY: ICD-10-CM

## 2024-07-23 DIAGNOSIS — N17.9 AKI (ACUTE KIDNEY INJURY): ICD-10-CM

## 2024-07-23 DIAGNOSIS — T45.1X5A IMMUNODEFICIENCY DUE TO CHEMOTHERAPY: ICD-10-CM

## 2024-07-23 DIAGNOSIS — R39.9 LOWER URINARY TRACT SYMPTOMS (LUTS): ICD-10-CM

## 2024-07-23 DIAGNOSIS — K59.03 DRUG-INDUCED CONSTIPATION: ICD-10-CM

## 2024-07-23 DIAGNOSIS — Z79.899 IMMUNODEFICIENCY DUE TO CHEMOTHERAPY: ICD-10-CM

## 2024-07-23 DIAGNOSIS — E43 SEVERE MALNUTRITION: ICD-10-CM

## 2024-07-23 DIAGNOSIS — D84.81 IMMUNODEFICIENCY SECONDARY TO NEOPLASM: ICD-10-CM

## 2024-07-23 DIAGNOSIS — T45.1X5A ANEMIA ASSOCIATED WITH CHEMOTHERAPY: ICD-10-CM

## 2024-07-23 LAB
ALBUMIN SERPL BCP-MCNC: 3.4 G/DL (ref 3.5–5.2)
ALP SERPL-CCNC: 142 U/L (ref 55–135)
ALT SERPL W/O P-5'-P-CCNC: 15 U/L (ref 10–44)
ANION GAP SERPL CALC-SCNC: 7 MMOL/L (ref 8–16)
AST SERPL-CCNC: 25 U/L (ref 10–40)
BILIRUB SERPL-MCNC: 1.4 MG/DL (ref 0.1–1)
BUN SERPL-MCNC: 12 MG/DL (ref 8–23)
CALCIUM SERPL-MCNC: 10 MG/DL (ref 8.7–10.5)
CEA SERPL-MCNC: 5.6 NG/ML (ref 0–5)
CHLORIDE SERPL-SCNC: 109 MMOL/L (ref 95–110)
CO2 SERPL-SCNC: 25 MMOL/L (ref 23–29)
CREAT SERPL-MCNC: 1.3 MG/DL (ref 0.5–1.4)
ERYTHROCYTE [DISTWIDTH] IN BLOOD BY AUTOMATED COUNT: 19.4 % (ref 11.5–14.5)
EST. GFR  (NO RACE VARIABLE): 58 ML/MIN/1.73 M^2
GLUCOSE SERPL-MCNC: 99 MG/DL (ref 70–110)
HCT VFR BLD AUTO: 35 % (ref 40–54)
HGB BLD-MCNC: 10.1 G/DL (ref 14–18)
IMM GRANULOCYTES # BLD AUTO: 0.01 K/UL (ref 0–0.04)
MCH RBC QN AUTO: 24.8 PG (ref 27–31)
MCHC RBC AUTO-ENTMCNC: 28.9 G/DL (ref 32–36)
MCV RBC AUTO: 86 FL (ref 82–98)
NEUTROPHILS # BLD AUTO: 2 K/UL (ref 1.8–7.7)
PLATELET # BLD AUTO: 233 K/UL (ref 150–450)
PMV BLD AUTO: 11.7 FL (ref 9.2–12.9)
POTASSIUM SERPL-SCNC: 5.1 MMOL/L (ref 3.5–5.1)
PROT SERPL-MCNC: 7.1 G/DL (ref 6–8.4)
RBC # BLD AUTO: 4.07 M/UL (ref 4.6–6.2)
SODIUM SERPL-SCNC: 141 MMOL/L (ref 136–145)
WBC # BLD AUTO: 4.68 K/UL (ref 3.9–12.7)

## 2024-07-23 PROCEDURE — 96367 TX/PROPH/DG ADDL SEQ IV INF: CPT

## 2024-07-23 PROCEDURE — 85027 COMPLETE CBC AUTOMATED: CPT | Performed by: REGISTERED NURSE

## 2024-07-23 PROCEDURE — 3078F DIAST BP <80 MM HG: CPT | Mod: CPTII,S$GLB,, | Performed by: INTERNAL MEDICINE

## 2024-07-23 PROCEDURE — 3008F BODY MASS INDEX DOCD: CPT | Mod: CPTII,S$GLB,, | Performed by: INTERNAL MEDICINE

## 2024-07-23 PROCEDURE — 3074F SYST BP LT 130 MM HG: CPT | Mod: CPTII,S$GLB,, | Performed by: INTERNAL MEDICINE

## 2024-07-23 PROCEDURE — 1111F DSCHRG MED/CURRENT MED MERGE: CPT | Mod: CPTII,S$GLB,, | Performed by: INTERNAL MEDICINE

## 2024-07-23 PROCEDURE — 1126F AMNT PAIN NOTED NONE PRSNT: CPT | Mod: CPTII,S$GLB,, | Performed by: INTERNAL MEDICINE

## 2024-07-23 PROCEDURE — G2211 COMPLEX E/M VISIT ADD ON: HCPCS | Mod: S$GLB,,, | Performed by: INTERNAL MEDICINE

## 2024-07-23 PROCEDURE — 25000003 PHARM REV CODE 250: Performed by: INTERNAL MEDICINE

## 2024-07-23 PROCEDURE — 96375 TX/PRO/DX INJ NEW DRUG ADDON: CPT

## 2024-07-23 PROCEDURE — 82378 CARCINOEMBRYONIC ANTIGEN: CPT | Performed by: REGISTERED NURSE

## 2024-07-23 PROCEDURE — 96416 CHEMO PROLONG INFUSE W/PUMP: CPT

## 2024-07-23 PROCEDURE — 3288F FALL RISK ASSESSMENT DOCD: CPT | Mod: CPTII,S$GLB,, | Performed by: INTERNAL MEDICINE

## 2024-07-23 PROCEDURE — 80053 COMPREHEN METABOLIC PANEL: CPT | Performed by: REGISTERED NURSE

## 2024-07-23 PROCEDURE — 96413 CHEMO IV INFUSION 1 HR: CPT

## 2024-07-23 PROCEDURE — 99999 PR PBB SHADOW E&M-EST. PATIENT-LVL III: CPT | Mod: PBBFAC,,, | Performed by: INTERNAL MEDICINE

## 2024-07-23 PROCEDURE — 36415 COLL VENOUS BLD VENIPUNCTURE: CPT | Performed by: REGISTERED NURSE

## 2024-07-23 PROCEDURE — 63600175 PHARM REV CODE 636 W HCPCS: Performed by: INTERNAL MEDICINE

## 2024-07-23 PROCEDURE — 1101F PT FALLS ASSESS-DOCD LE1/YR: CPT | Mod: CPTII,S$GLB,, | Performed by: INTERNAL MEDICINE

## 2024-07-23 PROCEDURE — 99215 OFFICE O/P EST HI 40 MIN: CPT | Mod: S$GLB,,, | Performed by: INTERNAL MEDICINE

## 2024-07-23 RX ORDER — HEPARIN 100 UNIT/ML
500 SYRINGE INTRAVENOUS
Status: CANCELLED | OUTPATIENT
Start: 2024-07-24

## 2024-07-23 RX ORDER — DIPHENHYDRAMINE HYDROCHLORIDE 50 MG/ML
50 INJECTION INTRAMUSCULAR; INTRAVENOUS ONCE AS NEEDED
Status: DISCONTINUED | OUTPATIENT
Start: 2024-07-23 | End: 2024-07-23 | Stop reason: HOSPADM

## 2024-07-23 RX ORDER — ATROPINE SULFATE 0.4 MG/ML
0.4 INJECTION, SOLUTION ENDOTRACHEAL; INTRAMEDULLARY; INTRAMUSCULAR; INTRAVENOUS; SUBCUTANEOUS ONCE AS NEEDED
Status: CANCELLED | OUTPATIENT
Start: 2024-07-24

## 2024-07-23 RX ORDER — ATROPINE SULFATE 0.4 MG/ML
0.4 INJECTION, SOLUTION ENDOTRACHEAL; INTRAMEDULLARY; INTRAMUSCULAR; INTRAVENOUS; SUBCUTANEOUS ONCE AS NEEDED
Status: COMPLETED | OUTPATIENT
Start: 2024-07-23 | End: 2024-07-23

## 2024-07-23 RX ORDER — SODIUM CHLORIDE 0.9 % (FLUSH) 0.9 %
10 SYRINGE (ML) INJECTION
Status: DISCONTINUED | OUTPATIENT
Start: 2024-07-23 | End: 2024-07-23 | Stop reason: HOSPADM

## 2024-07-23 RX ORDER — PROCHLORPERAZINE EDISYLATE 5 MG/ML
5 INJECTION INTRAMUSCULAR; INTRAVENOUS ONCE AS NEEDED
Status: CANCELLED
Start: 2024-07-24

## 2024-07-23 RX ORDER — HEPARIN 100 UNIT/ML
500 SYRINGE INTRAVENOUS
Status: CANCELLED | OUTPATIENT
Start: 2024-07-26

## 2024-07-23 RX ORDER — PROCHLORPERAZINE EDISYLATE 5 MG/ML
5 INJECTION INTRAMUSCULAR; INTRAVENOUS ONCE AS NEEDED
Status: DISCONTINUED | OUTPATIENT
Start: 2024-07-23 | End: 2024-07-23 | Stop reason: HOSPADM

## 2024-07-23 RX ORDER — EPINEPHRINE 0.3 MG/.3ML
0.3 INJECTION SUBCUTANEOUS ONCE AS NEEDED
Status: CANCELLED | OUTPATIENT
Start: 2024-07-24

## 2024-07-23 RX ORDER — HEPARIN 100 UNIT/ML
500 SYRINGE INTRAVENOUS
Status: DISCONTINUED | OUTPATIENT
Start: 2024-07-23 | End: 2024-07-23 | Stop reason: HOSPADM

## 2024-07-23 RX ORDER — EPINEPHRINE 0.3 MG/.3ML
0.3 INJECTION SUBCUTANEOUS ONCE AS NEEDED
Status: DISCONTINUED | OUTPATIENT
Start: 2024-07-23 | End: 2024-07-23 | Stop reason: HOSPADM

## 2024-07-23 RX ORDER — SODIUM CHLORIDE 0.9 % (FLUSH) 0.9 %
10 SYRINGE (ML) INJECTION
Status: CANCELLED | OUTPATIENT
Start: 2024-07-24

## 2024-07-23 RX ORDER — DIPHENHYDRAMINE HYDROCHLORIDE 50 MG/ML
50 INJECTION INTRAMUSCULAR; INTRAVENOUS ONCE AS NEEDED
Status: CANCELLED | OUTPATIENT
Start: 2024-07-24

## 2024-07-23 RX ORDER — SODIUM CHLORIDE 0.9 % (FLUSH) 0.9 %
10 SYRINGE (ML) INJECTION
Status: CANCELLED | OUTPATIENT
Start: 2024-07-26

## 2024-07-23 RX ADMIN — DEXAMETHASONE SODIUM PHOSPHATE 0.25 MG: 4 INJECTION, SOLUTION INTRA-ARTICULAR; INTRALESIONAL; INTRAMUSCULAR; INTRAVENOUS; SOFT TISSUE at 10:07

## 2024-07-23 RX ADMIN — SODIUM CHLORIDE: 9 INJECTION, SOLUTION INTRAVENOUS at 10:07

## 2024-07-23 RX ADMIN — FLUOROURACIL 3695 MG: 50 INJECTION, SOLUTION INTRAVENOUS at 12:07

## 2024-07-23 RX ADMIN — IRINOTECAN HYDROCHLORIDE 240 MG: 20 INJECTION INTRAVENOUS at 11:07

## 2024-07-23 RX ADMIN — ATROPINE SULFATE 0.4 MG: 0.4 INJECTION, SOLUTION INTRAVENOUS at 10:07

## 2024-07-23 NOTE — PLAN OF CARE
Problem: Chemotherapy Effects  Goal: Anemia Symptom Improvement  Outcome: Progressing  Goal: Safety Maintained  Outcome: Progressing  Goal: Absence of Hematuria  Outcome: Progressing  Goal: Nausea and Vomiting Relief  Outcome: Progressing  Goal: Neurotoxicity Symptom Control  Outcome: Progressing  Goal: Absence of Infection  Outcome: Progressing  Goal: Absence of Bleeding  Outcome: Progressing     Problem: Fatigue  Goal: Improved Activity Tolerance  Outcome: Progressing     Problem: Anemia  Goal: Anemia Symptom Improvement  Outcome: Progressing     Problem: Infection  Goal: Absence of Infection Signs and Symptoms  Outcome: Progressing   Pt completed irinotecan vis chest port without adverse effects. VS WNL discharged AAOX4 and ambulatory with 5FU pump set per MD orders. Pt will RTC 7/26 to have pump disconnected and port flushed and heparin locked

## 2024-07-23 NOTE — PROGRESS NOTES
[facility]  [facility]  Hematology/Medical Oncology Clinic     PATIENT: Javier Downs  MRN: [id number]  DATE: 7/23/2024    Diagnosis: Transverse colon metastatic cancer to the liver     Oncological history:   04/20/2021: metastatic colon cancer to the liver, moderately differentiated, pMMR  05/06/2021: C1 mFOLFOX + Pema  05/20/2021: C2 mFOLFOX + Pema  06/03/2021: C3 mFOLFOX + Pmea   06/17/2021: C4 mFOLFOX + Pema  07/01/2021: C5 mFOLFOX + Pema  07/15/2021: C6 mFOLFOX + Pema  Restaging CT CAP: significant improvement of lesions   07/29/2021: C7 mFOLFOX + Pema   08/12/2021: Switched to maintenance  5FU+Pema (C8)  06/23/2022: C30 maintenance 5FU+Pema  10/20/2022: R hemicolectomy with Dr. Bonilla  12/30/2022: Y-90 to R liver  1/27/2023: Y-90 to R liver  5/17/2023: Y-90 to R liver  7/11/2023: C1 FOLFIRI + Pema  7/26/2023: C2 FOLFIRI + Pema  8/8/2023: C3 FOLFIRI + Pema  8/22/23: C4 FOLFIRI + Pema  Restaging CT CAP 9/1/23: Good response to chemo.  9/7/23: C5 FOLFIRI + Pema  ......................................  Restaging CT CAP 10/12/23: Good response to chemo  10/16/23: C8 FOLFIRI + Pema  .......................................  Restaging CT CAP 12/8/23: Good response to chemo  12/11/23: C12 FOLFIRI + Pema  .......................................  Restaging CT CAP 2/5/24: Good response to chemo  2/8/24: C16 FOLFIRI + Pema    Restaging CT CAP 4/26/24: Good response to chemo  Restaging CT CAP 6/6/24: Good response to chemo      Interval History:   He presents today with his daughter prior to cycle 28 of FOLFIRI (off Avastin due to GIB).  He was in the hospital again briefly for recurrent presumed diverticular bleed.  Had colonoscopy which showed no active bleeding, but diverticulosis.  CTA without active bleeding and showed stable metastatic disease in liver.  He is feeling well today.  No hematochezia in the last two weeks.  Understandably anxious about this happening again.  Had a normal BM yesterday.   Denies pain, dyspnea, lightheadedness.     ECOG status is 1. Presents with his daughter today.    Past Medical History:   Past Medical History:   Diagnosis Date    MARIA A (acute kidney injury) 2022    Hypertension     Metastatic colon cancer to liver 2021     Past Surgical HIstory:   Past Surgical History:   Procedure Laterality Date    COLONOSCOPY N/A 2021    Procedure: COLONOSCOPY with possible stent;  Surgeon: EDILMA Bonilla MD;  Location: [facility] ENDO (2ND FLR);  Service: Colon and Rectal;  Laterality: N/A;    COLONOSCOPY N/A 11/3/2023    Procedure: COLONOSCOPY;  Surgeon: EDILMA Bonilla MD;  Location: [facility] ENDO (4TH FLR);  Service: Endoscopy;  Laterality: N/A;  prep instructions sent to pt via portal  From: EDILMA Bonilla MD  Procedure: Colonoscopy  Diagnosis: Surveillance colonoscopy - Hx of colon cancer  Procedure Timin-12 weeks  Provider: Myself  Location: [address]  Additional Scheduling Information: No scheduling con    COLONOSCOPY N/A 7/10/2024    Procedure: COLONOSCOPY;  Surgeon: Tam Pantoja MD;  Location: [facility] ENDO (2ND FLR);  Service: Endoscopy;  Laterality: N/A;    DIAGNOSTIC LAPAROSCOPY N/A 2022    Procedure: LAPAROSCOPY, DIAGNOSTIC;  Surgeon: Adrian Rodriguez MD;  Location: [facility] OR 2ND FLR;  Service: General;  Laterality: N/A;    INSERTION OF TUNNELED CENTRAL VENOUS CATHETER (CVC) WITH SUBCUTANEOUS PORT N/A 5/3/2021    Procedure: [id number];  Surgeon: Francisco Layton MD;  Location: [facility] OR 2ND FLR;  Service: Vascular;  Laterality: N/A;    OMENTECTOMY N/A 10/20/2022    Procedure: OMENTECTOMY;  Surgeon: EDILMA Bonilla MD;  Location: [facility] OR 2ND FLR;  Service: Colon and Rectal;  Laterality: N/A;    RIGHT HEMICOLECTOMY N/A 10/20/2022    Procedure: HEMICOLECTOMY, RIGHT, extended;  Surgeon: EDILMA Bonilla MD;  Location: [facility] OR 2ND FLR;  Service: Colon and Rectal;  Laterality: N/A;  Extended right hemicolectomy CONSENT IN AM     Family History:   Family History    Problem Relation Name Age of Onset    Cancer Brother         Social History:  reports that he has never smoked. He has never used smokeless tobacco. He reports that he does not currently use alcohol. He reports that he does not use drugs.    Allergies:  Review of patient's allergies indicates:  No Known Allergies    Medications:  Current Outpatient Medications   Medication Sig Dispense Refill    acetaminophen (TYLENOL) 500 MG tablet Take 1 tablet (500 mg total) by mouth every 6 (six) hours as needed for Pain (alternate with ibuprofen).  0    amLODIPine (NORVASC) 10 MG tablet Take 1 tablet (10 mg total) by mouth once daily. 90 tablet 3    LIDOcaine-prilocaine (EMLA) cream Apply topically as needed (port application). 30 g 3    ondansetron (ZOFRAN) 4 MG tablet Take 1 tablet (4 mg total) by mouth every 8 (eight) hours as needed for Nausea. 30 tablet 3    oxyCODONE (ROXICODONE) 5 MG immediate release tablet Take 1 tablet (5 mg total) by mouth every 6 (six) hours as needed for Pain. 20 tablet 0    pantoprazole (PROTONIX) 40 MG tablet Take 1 tablet (40 mg total) by mouth 2 (two) times daily before meals. 180 tablet 3    tamsulosin (FLOMAX) 0.4 mg Cap Take 1 capsule (0.4 mg total) by mouth once daily. 30 capsule 5     No current facility-administered medications for this visit.     Review of Systems   Constitutional:  Negative for activity change, appetite change, chills, diaphoresis, fatigue, fever and unexpected weight change.   HENT:  Negative for congestion, mouth sores, nosebleeds, postnasal drip, rhinorrhea, trouble swallowing and voice change.    Eyes:  Negative for pain and visual disturbance.   Respiratory:  Negative for cough, chest tightness, shortness of breath and wheezing.    Cardiovascular:  Negative for chest pain, palpitations and leg swelling.   Gastrointestinal:  Negative for abdominal distention, abdominal pain, blood in stool, constipation, diarrhea, nausea and vomiting.   Genitourinary:  Negative  for difficulty urinating, dysuria, flank pain, frequency, hematuria and urgency.   Musculoskeletal:  Negative for arthralgias, back pain and myalgias.   Skin:  Negative for rash and wound.   Neurological:  Negative for dizziness, facial asymmetry, weakness, light-headedness, numbness and headaches.   Psychiatric/Behavioral:  Negative for agitation, behavioral problems, confusion, decreased concentration, dysphoric mood and sleep disturbance. The patient is not nervous/anxious.    All other systems reviewed and are negative.    ECOG Performance Status: 1     Objective:      Vitals:   Vitals:    07/23/24 0829   BP: 100/61   BP Location: Left arm   Patient Position: Sitting   BP Method: Large (Automatic)   Pulse: 86   SpO2: 100%   Weight: 60 kg (132 lb 4.4 oz)         Physical Exam  Vitals reviewed.   Constitutional:       General: He is not in acute distress.     Appearance: Normal appearance. He is not ill-appearing, toxic-appearing or diaphoretic.   HENT:      Head: Normocephalic and atraumatic.      Right Ear: External ear normal.      Left Ear: External ear normal.      Nose: Nose normal.      Mouth/Throat:      Pharynx: Oropharynx is clear.   Eyes:      General: No scleral icterus.     Extraocular Movements: Extraocular movements intact.      Conjunctiva/sclera: Conjunctivae normal.      Pupils: Pupils are equal, round, and reactive to light.   Cardiovascular:      Rate and Rhythm: Normal rate and regular rhythm.      Pulses: Normal pulses.      Heart sounds: Normal heart sounds. No murmur heard.  Pulmonary:      Effort: Pulmonary effort is normal. No respiratory distress.      Breath sounds: Normal breath sounds. No wheezing.   Chest:      Comments: Port to RCW, no signs of infection.  Abdominal:      General: Abdomen is flat. Bowel sounds are normal. There is no distension.      Palpations: Abdomen is soft. There is no mass.      Tenderness: There is no abdominal tenderness.      Comments: Vertical midline  abdominal wound well healed   Musculoskeletal:         General: No swelling or deformity. Normal range of motion.      Right lower leg: No edema.      Left lower leg: No edema.   Skin:     Coloration: Skin is not jaundiced or pale.      Findings: No bruising, erythema or rash.   Neurological:      General: No focal deficit present.      Mental Status: He is alert and oriented to person, place, and time. Mental status is at baseline.      Cranial Nerves: No cranial nerve deficit.      Sensory: No sensory deficit.      Gait: Gait normal.   Psychiatric:         Mood and Affect: Mood normal.         Behavior: Behavior normal.         Thought Content: Thought content normal.         Judgment: Judgment normal.     Laboratory Data:  Lab Visit on 07/23/2024   Component Date Value Ref Range Status    Specimen UA 07/23/2024 Urine, Clean Catch   Final    Color, UA 07/23/2024 Yellow  Yellow, Straw, [place] Final    Appearance, UA 07/23/2024 Clear  Clear Final    pH, UA 07/23/2024 6.0  5.0 - 8.0 Final    Specific Gravity, UA 07/23/2024 1.020  1.005 - 1.030 Final    Protein, UA 07/23/2024 Trace (A)  Negative Final    Comment: Recommend a 24 hour urine protein or a urine   protein/creatinine ratio if globulin induced proteinuria is  clinically suspected.      Glucose, UA 07/23/2024 Negative  Negative Final    Ketones, UA 07/23/2024 Negative  Negative Final    Bilirubin (UA) 07/23/2024 Negative  Negative Final    Occult Blood UA 07/23/2024 Negative  Negative Final    Nitrite, UA 07/23/2024 Negative  Negative Final    Leukocytes, UA 07/23/2024 Negative  Negative Final   Lab Visit on 07/23/2024   Component Date Value Ref Range Status    CEA 07/23/2024 5.6 (H)  0.0 - 5.0 ng/mL Final    Comment: CEA Normal Range:  Non-Smokers: 0-3.0 ng/mL  Smokers:     0-5.0 ng/mL    The testing method is a chemiluminescent microparticle immunoassay   manufactured by [employer] and performed on the Echo it system. Values  obtained with different assay manufacturers   for   methods may be different and cannot be used interchangeably.      Sodium 07/23/2024 141  136 - 145 mmol/L Final    Potassium 07/23/2024 5.1  3.5 - 5.1 mmol/L Final    Chloride 07/23/2024 109  95 - 110 mmol/L Final    CO2 07/23/2024 25  23 - 29 mmol/L Final    Glucose 07/23/2024 99  70 - 110 mg/dL Final    BUN 07/23/2024 12  8 - 23 mg/dL Final    Creatinine 07/23/2024 1.3  0.5 - 1.4 mg/dL Final    Calcium 07/23/2024 10.0  8.7 - 10.5 mg/dL Final    Total Protein 07/23/2024 7.1  6.0 - 8.4 g/dL Final    Albumin 07/23/2024 3.4 (L)  3.5 - 5.2 g/dL Final    Total Bilirubin 07/23/2024 1.4 (H)  0.1 - 1.0 mg/dL Final    Comment: For infants and newborns, interpretation of results should be based  on gestational age, weight and in agreement with clinical  observations.    Premature Infant recommended reference ranges:  Up to 24 hours.............<8.0 mg/dL  Up to 48 hours............<12.0 mg/dL  3-5 days..................<15.0 mg/dL  6-29 days.................<15.0 mg/dL      Alkaline Phosphatase 07/23/2024 142 (H)  55 - 135 U/L Final    AST 07/23/2024 25  10 - 40 U/L Final    ALT 07/23/2024 15  10 - 44 U/L Final    eGFR 07/23/2024 58.0 (A)  >60 mL/min/1.73 m^2 Final    Anion Gap 07/23/2024 7 (L)  8 - 16 mmol/L Final    WBC 07/23/2024 4.68  3.90 - 12.70 K/uL Final    RBC 07/23/2024 4.07 (L)  4.60 - 6.20 M/uL Final    Hemoglobin 07/23/2024 10.1 (L)  14.0 - 18.0 g/dL Final    Hematocrit 07/23/2024 35.0 (L)  40.0 - 54.0 % Final    MCV 07/23/2024 86  82 - 98 fL Final    MCH 07/23/2024 24.8 (L)  27.0 - 31.0 pg Final    MCHC 07/23/2024 28.9 (L)  32.0 - 36.0 g/dL Final    RDW 07/23/2024 19.4 (H)  11.5 - 14.5 % Final    Platelets 07/23/2024 233  150 - 450 K/uL Final    MPV 07/23/2024 11.7  9.2 - 12.9 fL Final    Gran # (ANC) 07/23/2024 2.0  1.8 - 7.7 K/uL Final    Comment: The ANC is based on a white cell differential from an   automated cell counter. It has not been microscopically    reviewed for the presence of abnormal cells. Clinical   correlation is required.      Immature Grans (Abs) 07/23/2024 0.01  0.00 - 0.04 K/uL Final    Comment: Mild elevation in immature granulocytes is non specific and   can be seen in a variety of conditions including stress response,   acute inflammation, trauma and pregnancy. Correlation with other   laboratory and clinical findings is essential.       Assessment and Plan        1. Metastatic colon cancer to liver    2. Immunodeficiency secondary to neoplasm    3. Immunodeficiency due to chemotherapy    4. Lower GI bleed    5. Anemia associated with chemotherapy    6. MARIA A (acute kidney injury)    7. Pain    8. Weight decrease    9. Drug-induced constipation    10. Severe malnutrition    11. Lower urinary tract symptoms (LUTS)    12. Hyperkalemia    13. Hypertension, unspecified type        1-5.  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  Guardant 360 was negative for BRAF, VIRI, HER2 amplification.   Pretreatment CEA 57.  We had a long and sonia discussion with him about his diagnosis. Unfortunately, the disease is not curable but remains treatable and he has good performance status.   Restaging scans after 7 cycles of FOLFOX + Pema showed significant reduction in colonic mass and liver mass.   we switched to maintenance as of cycle 8 with 5FU + Pema  Restaging scans from March 2022 show stable disease.   MRI on 7/18/22 showing hepatic progression of disease in the right hepatic lobe noted on the exam. CT CAP on 8/26 shows some mild progression in both liver metastases.    Discussed case at colorectal tumor board 8/31/22 and had a diagnostic lap performed by Dr. Rodriguez on 9/7 to assess for any occult peritoneal disease. Findings from the diagnostic lap were negative. Fluid from around from around the primary mass was sent for cytology that was negative for malignancy.   Underwent primary tumor resection on 10/20/22 with   Chad.    Meanwhile his liver mets had grown.  We discussed his case at [facility] conference with plans for short term Y-90 and then restarting chemotherapy prior to considering any surgical options to his liver metastases.  He underwent Y-90 delivery on 12/30/22. Tolerated well.   CT CAP on 1/23/23 reviewed at [facility] conference with good response to Y-90, no new lesions.  Underwent second Y-90 on 1/27/23. Can consider R hepatectomy pending follow-up imaging after Y-90.     Received cycle 42 of FOLFOX (omitted oxaliplatin again starting cycle 41 due to neuropathy) on 2/9/23.  Because of 5-FU shortage nationally, we have been holding chemotherapy since mid-February 2023.  If he needs to restart chemotherapy (I.e. no plans for surgical resection), will place him on capecitabine maintenance for duration of 5-FU shortage.     Discussed at colorectal liver mets tumor board 3/16/23.  Plan for repeat Y-90 and then consideration of surgical resection and he will meet with liver transplant team as well.  Underwent Y-90 delivery 5/17/23 with IR.     Has been on maintenance Xeloda since late April 2023.    Met with liver transplant team but ultimately opted not to proceed with transplant as he was concerned about how he would tolerate a major surgery with the life changes that would come after transplant as well. They closed out his case.    He met with Dr. Rodriguez to discuss whether surgical resection is indicated for his liver mets.  He recommended repeat MRI.  Repeat MRI unfortunately showed disease progression while on Xeloda maintenance.  Similarly his CEA garrett precipitously, all in keeping with worsening disease.    We recommended we restart IV chemotherapy with FOLFIRI + Avastin (never had irinotecan).    Because of hyperbilirubinemia he received cycle 1 with just 5-FU + Avastin on 7/11/23. Tolerated this well. Bilirubin has improved.  Received irinotecan starting with cycle 2.  Repeat CT CAP after cycle 4 shows good  response to therapy.   Excellent tolerance. [facility] tumor board agrees with continuation of chemotherapy.   Repeat CT CAP after cycle 7 shows stable disease, no evidence of new or worsening disease.   Repeat CT CAP after cycle 11 shows stable disease.   Repeat CT CAP after cycle 15 shows improved disease.  Repeat CT CAP after cycle 21 shows stable disease.   Repeat CT CAP after cycle 24 shows stable disease.    Hospitalized after cycle 26 and 27 (without Avastin) for hematochezia.  Colonoscopy with likely diverticular bleed.  Required 2 units of blood in last month.  Will continue to hold bevacizumab indefinitely.  Discussed that we don't have a very good way preventing this recurrence and chemo with its associated thrombocytopenia may make this more likely to occur again.  He still wishes to proceed, which is my recommendation as well.  Proceed with cycle 28 today of FOLFIRI.    Repeat imaging after cycle 28.     -RTC in 2 weeks for next cycle with lab work/UA     Tempus xT: APC, CKS1B cng, ERCC3 cnl, PIK3CA; KENIA, TMB 12.1.  Tempus xF: APC, KRAS Q61H, PIK3CA, TP53; KENIA    6. MARIA A  -- Resolved   -- Monitor. Encouraged continued hydration particularly with working outside.     7-10. Neoplasm related pain, weight loss, constipation, malnutrition  -- Pain very well controlled. Has oxycodone 5mg to use PRN but not requiring now.  -- Following with nutrition. Encouraged increased protein. Monitor.  -- Continue Miralax daily for constipation. Doing well with this.     11, Urinary Hesitancy.  -- Continue Flomax.   -- Reported improvement since starting medication.     12. Hyperkalemia  - K 5.1 today. Monitor.    13. Hypertension   -- BP well controlled today. Feels well.   -- Following with PCP.   -- encouraged him and his daughter to monitor BP and HR closely at home.     Patient is in agreement with the proposed treatment plan. All questions were answered to the patient's satisfaction. Pt knows to call clinic if anything is  needed before the next clinic visit.    Bunny Schuster MD  Hematology/Oncology  [facility]           Route Chart for Scheduling    Med Onc Chart Routing      Follow up with physician 4 weeks. for FOLFIRI   Follow up with RACHEL 2 weeks. for FOLFIRI   Infusion scheduling note    Injection scheduling note    Labs CBC, CMP and CEA   Scheduling:  Preferred lab:  Lab interval:     Imaging    Pharmacy appointment    Other referrals

## 2024-07-25 ENCOUNTER — LAB VISIT (OUTPATIENT)
Dept: LAB | Facility: HOSPITAL | Age: 74
End: 2024-07-25
Attending: INTERNAL MEDICINE
Payer: MEDICARE

## 2024-07-25 ENCOUNTER — INFUSION (OUTPATIENT)
Dept: INFUSION THERAPY | Facility: HOSPITAL | Age: 74
End: 2024-07-25
Payer: MEDICARE

## 2024-07-25 VITALS
HEART RATE: 82 BPM | SYSTOLIC BLOOD PRESSURE: 107 MMHG | DIASTOLIC BLOOD PRESSURE: 64 MMHG | TEMPERATURE: 98 F | RESPIRATION RATE: 18 BRPM | HEIGHT: 67 IN | WEIGHT: 132.25 LBS | BODY MASS INDEX: 20.76 KG/M2 | OXYGEN SATURATION: 100 %

## 2024-07-25 DIAGNOSIS — C18.9 METASTATIC COLON CANCER TO LIVER: Primary | ICD-10-CM

## 2024-07-25 DIAGNOSIS — C78.7 METASTATIC COLON CANCER TO LIVER: Primary | ICD-10-CM

## 2024-07-25 DIAGNOSIS — C18.9 METASTATIC COLON CANCER TO LIVER: ICD-10-CM

## 2024-07-25 DIAGNOSIS — C78.7 METASTATIC COLON CANCER TO LIVER: ICD-10-CM

## 2024-07-25 LAB
ALBUMIN SERPL BCP-MCNC: 3 G/DL (ref 3.5–5.2)
ALP SERPL-CCNC: 111 U/L (ref 55–135)
ALT SERPL W/O P-5'-P-CCNC: 23 U/L (ref 10–44)
ANION GAP SERPL CALC-SCNC: 8 MMOL/L (ref 8–16)
AST SERPL-CCNC: 40 U/L (ref 10–40)
BILIRUB SERPL-MCNC: 1.3 MG/DL (ref 0.1–1)
BUN SERPL-MCNC: 20 MG/DL (ref 8–23)
CALCIUM SERPL-MCNC: 8.6 MG/DL (ref 8.7–10.5)
CHLORIDE SERPL-SCNC: 110 MMOL/L (ref 95–110)
CO2 SERPL-SCNC: 22 MMOL/L (ref 23–29)
CREAT SERPL-MCNC: 1 MG/DL (ref 0.5–1.4)
EST. GFR  (NO RACE VARIABLE): >60 ML/MIN/1.73 M^2
GLUCOSE SERPL-MCNC: 90 MG/DL (ref 70–110)
POTASSIUM SERPL-SCNC: 4.7 MMOL/L (ref 3.5–5.1)
PROT SERPL-MCNC: 6 G/DL (ref 6–8.4)
SODIUM SERPL-SCNC: 140 MMOL/L (ref 136–145)

## 2024-07-25 PROCEDURE — A4216 STERILE WATER/SALINE, 10 ML: HCPCS | Performed by: INTERNAL MEDICINE

## 2024-07-25 PROCEDURE — 80053 COMPREHEN METABOLIC PANEL: CPT | Performed by: INTERNAL MEDICINE

## 2024-07-25 PROCEDURE — 63600175 PHARM REV CODE 636 W HCPCS: Performed by: INTERNAL MEDICINE

## 2024-07-25 PROCEDURE — 36415 COLL VENOUS BLD VENIPUNCTURE: CPT | Performed by: INTERNAL MEDICINE

## 2024-07-25 PROCEDURE — 25000003 PHARM REV CODE 250: Performed by: INTERNAL MEDICINE

## 2024-07-25 RX ORDER — HEPARIN 100 UNIT/ML
500 SYRINGE INTRAVENOUS
Status: DISCONTINUED | OUTPATIENT
Start: 2024-07-25 | End: 2024-07-25 | Stop reason: HOSPADM

## 2024-07-25 RX ORDER — SODIUM CHLORIDE 0.9 % (FLUSH) 0.9 %
10 SYRINGE (ML) INJECTION
Status: DISCONTINUED | OUTPATIENT
Start: 2024-07-25 | End: 2024-07-25 | Stop reason: HOSPADM

## 2024-07-25 RX ADMIN — SODIUM CHLORIDE, PRESERVATIVE FREE 10 ML: 5 INJECTION INTRAVENOUS at 10:07

## 2024-07-25 RX ADMIN — HEPARIN 500 UNITS: 100 SYRINGE at 10:07

## 2024-08-01 ENCOUNTER — HOSPITAL ENCOUNTER (OUTPATIENT)
Dept: RADIOLOGY | Facility: HOSPITAL | Age: 74
Discharge: HOME | End: 2024-08-01
Attending: REGISTERED NURSE
Payer: MEDICARE

## 2024-08-01 DIAGNOSIS — C78.7 METASTATIC COLON CANCER TO LIVER: ICD-10-CM

## 2024-08-01 DIAGNOSIS — C18.9 METASTATIC COLON CANCER TO LIVER: ICD-10-CM

## 2024-08-01 PROCEDURE — 74177 CT ABD & PELVIS W/CONTRAST: CPT | Mod: 26,,, | Performed by: RADIOLOGY

## 2024-08-01 PROCEDURE — 71260 CT THORAX DX C+: CPT | Mod: 26,,, | Performed by: RADIOLOGY

## 2024-08-01 PROCEDURE — A9698 NON-RAD CONTRAST MATERIALNOC: HCPCS | Performed by: REGISTERED NURSE

## 2024-08-01 PROCEDURE — 25500020 PHARM REV CODE 255: Performed by: REGISTERED NURSE

## 2024-08-01 PROCEDURE — 74177 CT ABD & PELVIS W/CONTRAST: CPT | Mod: TC

## 2024-08-01 RX ADMIN — IOHEXOL 75 ML: 350 INJECTION, SOLUTION INTRAVENOUS at 03:08

## 2024-08-01 RX ADMIN — BARIUM SULFATE 450 ML: 20 SUSPENSION ORAL at 03:08

## 2024-08-05 ENCOUNTER — INFUSION (OUTPATIENT)
Dept: INFUSION THERAPY | Facility: HOSPITAL | Age: 74
End: 2024-08-05
Payer: MEDICARE

## 2024-08-05 ENCOUNTER — OFFICE VISIT (OUTPATIENT)
Dept: HEMATOLOGY/ONCOLOGY | Facility: CLINIC | Age: 74
End: 2024-08-05
Payer: MEDICARE

## 2024-08-05 ENCOUNTER — LAB VISIT (OUTPATIENT)
Dept: LAB | Facility: HOSPITAL | Age: 74
End: 2024-08-05
Attending: INTERNAL MEDICINE
Payer: MEDICARE

## 2024-08-05 VITALS
WEIGHT: 135.81 LBS | BODY MASS INDEX: 21.31 KG/M2 | HEART RATE: 88 BPM | RESPIRATION RATE: 20 BRPM | WEIGHT: 135.81 LBS | SYSTOLIC BLOOD PRESSURE: 100 MMHG | BODY MASS INDEX: 21.31 KG/M2 | HEIGHT: 67 IN | DIASTOLIC BLOOD PRESSURE: 60 MMHG | OXYGEN SATURATION: 100 % | HEIGHT: 67 IN | TEMPERATURE: 98 F

## 2024-08-05 DIAGNOSIS — C78.7 METASTATIC COLON CANCER TO LIVER: ICD-10-CM

## 2024-08-05 DIAGNOSIS — C78.7 METASTATIC COLON CANCER TO LIVER: Primary | ICD-10-CM

## 2024-08-05 DIAGNOSIS — I10 HYPERTENSION, UNSPECIFIED TYPE: ICD-10-CM

## 2024-08-05 DIAGNOSIS — R39.9 LOWER URINARY TRACT SYMPTOMS (LUTS): ICD-10-CM

## 2024-08-05 DIAGNOSIS — Z79.899 IMMUNODEFICIENCY DUE TO CHEMOTHERAPY: ICD-10-CM

## 2024-08-05 DIAGNOSIS — R63.4 WEIGHT DECREASE: ICD-10-CM

## 2024-08-05 DIAGNOSIS — D64.81 ANEMIA ASSOCIATED WITH CHEMOTHERAPY: ICD-10-CM

## 2024-08-05 DIAGNOSIS — T45.1X5A ANEMIA ASSOCIATED WITH CHEMOTHERAPY: ICD-10-CM

## 2024-08-05 DIAGNOSIS — E43 SEVERE MALNUTRITION: ICD-10-CM

## 2024-08-05 DIAGNOSIS — D84.821 IMMUNODEFICIENCY DUE TO CHEMOTHERAPY: ICD-10-CM

## 2024-08-05 DIAGNOSIS — D49.9 IMMUNODEFICIENCY SECONDARY TO NEOPLASM: ICD-10-CM

## 2024-08-05 DIAGNOSIS — T45.1X5A IMMUNODEFICIENCY DUE TO CHEMOTHERAPY: ICD-10-CM

## 2024-08-05 DIAGNOSIS — D84.81 IMMUNODEFICIENCY SECONDARY TO NEOPLASM: ICD-10-CM

## 2024-08-05 DIAGNOSIS — C18.9 METASTATIC COLON CANCER TO LIVER: Primary | ICD-10-CM

## 2024-08-05 DIAGNOSIS — K59.03 DRUG-INDUCED CONSTIPATION: ICD-10-CM

## 2024-08-05 DIAGNOSIS — C18.9 METASTATIC COLON CANCER TO LIVER: ICD-10-CM

## 2024-08-05 DIAGNOSIS — R52 PAIN: ICD-10-CM

## 2024-08-05 DIAGNOSIS — K92.2 LOWER GI BLEED: ICD-10-CM

## 2024-08-05 DIAGNOSIS — N17.9 AKI (ACUTE KIDNEY INJURY): ICD-10-CM

## 2024-08-05 PROCEDURE — 3074F SYST BP LT 130 MM HG: CPT | Mod: CPTII,S$GLB,, | Performed by: INTERNAL MEDICINE

## 2024-08-05 PROCEDURE — 3288F FALL RISK ASSESSMENT DOCD: CPT | Mod: CPTII,S$GLB,, | Performed by: INTERNAL MEDICINE

## 2024-08-05 PROCEDURE — 1159F MED LIST DOCD IN RCRD: CPT | Mod: CPTII,S$GLB,, | Performed by: INTERNAL MEDICINE

## 2024-08-05 PROCEDURE — 81003 URINALYSIS AUTO W/O SCOPE: CPT | Performed by: INTERNAL MEDICINE

## 2024-08-05 PROCEDURE — 99215 OFFICE O/P EST HI 40 MIN: CPT | Mod: S$GLB,,, | Performed by: INTERNAL MEDICINE

## 2024-08-05 PROCEDURE — 1101F PT FALLS ASSESS-DOCD LE1/YR: CPT | Mod: CPTII,S$GLB,, | Performed by: INTERNAL MEDICINE

## 2024-08-05 PROCEDURE — 1111F DSCHRG MED/CURRENT MED MERGE: CPT | Mod: CPTII,S$GLB,, | Performed by: INTERNAL MEDICINE

## 2024-08-05 PROCEDURE — 99999 PR PBB SHADOW E&M-EST. PATIENT-LVL III: CPT | Mod: PBBFAC,,, | Performed by: INTERNAL MEDICINE

## 2024-08-05 PROCEDURE — 96416 CHEMO PROLONG INFUSE W/PUMP: CPT

## 2024-08-05 PROCEDURE — 96413 CHEMO IV INFUSION 1 HR: CPT

## 2024-08-05 PROCEDURE — 3008F BODY MASS INDEX DOCD: CPT | Mod: CPTII,S$GLB,, | Performed by: INTERNAL MEDICINE

## 2024-08-05 PROCEDURE — 3078F DIAST BP <80 MM HG: CPT | Mod: CPTII,S$GLB,, | Performed by: INTERNAL MEDICINE

## 2024-08-05 PROCEDURE — 25000003 PHARM REV CODE 250: Performed by: INTERNAL MEDICINE

## 2024-08-05 PROCEDURE — 1126F AMNT PAIN NOTED NONE PRSNT: CPT | Mod: CPTII,S$GLB,, | Performed by: INTERNAL MEDICINE

## 2024-08-05 PROCEDURE — 63600175 PHARM REV CODE 636 W HCPCS: Performed by: INTERNAL MEDICINE

## 2024-08-05 PROCEDURE — 96375 TX/PRO/DX INJ NEW DRUG ADDON: CPT

## 2024-08-05 PROCEDURE — 96367 TX/PROPH/DG ADDL SEQ IV INF: CPT

## 2024-08-05 PROCEDURE — G2211 COMPLEX E/M VISIT ADD ON: HCPCS | Mod: S$GLB,,, | Performed by: INTERNAL MEDICINE

## 2024-08-05 RX ORDER — PROCHLORPERAZINE EDISYLATE 5 MG/ML
5 INJECTION INTRAMUSCULAR; INTRAVENOUS ONCE AS NEEDED
Status: DISCONTINUED | OUTPATIENT
Start: 2024-08-05 | End: 2024-08-05 | Stop reason: HOSPADM

## 2024-08-05 RX ORDER — SODIUM CHLORIDE 0.9 % (FLUSH) 0.9 %
10 SYRINGE (ML) INJECTION
Status: CANCELLED | OUTPATIENT
Start: 2024-08-07

## 2024-08-05 RX ORDER — HEPARIN 100 UNIT/ML
500 SYRINGE INTRAVENOUS
Status: DISCONTINUED | OUTPATIENT
Start: 2024-08-05 | End: 2024-08-05 | Stop reason: HOSPADM

## 2024-08-05 RX ORDER — HEPARIN 100 UNIT/ML
500 SYRINGE INTRAVENOUS
Status: CANCELLED | OUTPATIENT
Start: 2024-08-07

## 2024-08-05 RX ORDER — DIPHENHYDRAMINE HYDROCHLORIDE 50 MG/ML
50 INJECTION INTRAMUSCULAR; INTRAVENOUS ONCE AS NEEDED
Status: DISCONTINUED | OUTPATIENT
Start: 2024-08-05 | End: 2024-08-05 | Stop reason: HOSPADM

## 2024-08-05 RX ORDER — EPINEPHRINE 0.3 MG/.3ML
0.3 INJECTION SUBCUTANEOUS ONCE AS NEEDED
Status: DISCONTINUED | OUTPATIENT
Start: 2024-08-05 | End: 2024-08-05 | Stop reason: HOSPADM

## 2024-08-05 RX ORDER — DIPHENHYDRAMINE HYDROCHLORIDE 50 MG/ML
50 INJECTION INTRAMUSCULAR; INTRAVENOUS ONCE AS NEEDED
Status: CANCELLED | OUTPATIENT
Start: 2024-08-07

## 2024-08-05 RX ORDER — ATROPINE SULFATE 0.4 MG/ML
0.4 INJECTION, SOLUTION ENDOTRACHEAL; INTRAMEDULLARY; INTRAMUSCULAR; INTRAVENOUS; SUBCUTANEOUS ONCE AS NEEDED
Status: CANCELLED | OUTPATIENT
Start: 2024-08-07

## 2024-08-05 RX ORDER — SODIUM CHLORIDE 0.9 % (FLUSH) 0.9 %
10 SYRINGE (ML) INJECTION
Status: DISCONTINUED | OUTPATIENT
Start: 2024-08-05 | End: 2024-08-05 | Stop reason: HOSPADM

## 2024-08-05 RX ORDER — EPINEPHRINE 0.3 MG/.3ML
0.3 INJECTION SUBCUTANEOUS ONCE AS NEEDED
Status: CANCELLED | OUTPATIENT
Start: 2024-08-07

## 2024-08-05 RX ORDER — ATROPINE SULFATE 0.4 MG/ML
0.4 INJECTION, SOLUTION ENDOTRACHEAL; INTRAMEDULLARY; INTRAMUSCULAR; INTRAVENOUS; SUBCUTANEOUS ONCE AS NEEDED
Status: COMPLETED | OUTPATIENT
Start: 2024-08-05 | End: 2024-08-05

## 2024-08-05 RX ORDER — PROCHLORPERAZINE EDISYLATE 5 MG/ML
5 INJECTION INTRAMUSCULAR; INTRAVENOUS ONCE AS NEEDED
Status: CANCELLED
Start: 2024-08-07

## 2024-08-05 RX ADMIN — ATROPINE SULFATE 0.4 MG: 0.4 INJECTION, SOLUTION INTRAVENOUS at 02:08

## 2024-08-05 RX ADMIN — FLUOROURACIL 3695 MG: 50 INJECTION, SOLUTION INTRAVENOUS at 04:08

## 2024-08-05 RX ADMIN — DEXAMETHASONE SODIUM PHOSPHATE 0.25 MG: 4 INJECTION, SOLUTION INTRA-ARTICULAR; INTRALESIONAL; INTRAMUSCULAR; INTRAVENOUS; SOFT TISSUE at 02:08

## 2024-08-05 RX ADMIN — SODIUM CHLORIDE 240 MG: 9 INJECTION, SOLUTION INTRAVENOUS at 02:08

## 2024-08-05 RX ADMIN — SODIUM CHLORIDE: 9 INJECTION, SOLUTION INTRAVENOUS at 01:08

## 2024-08-07 ENCOUNTER — INFUSION (OUTPATIENT)
Dept: INFUSION THERAPY | Facility: HOSPITAL | Age: 74
End: 2024-08-07
Payer: MEDICARE

## 2024-08-07 VITALS
RESPIRATION RATE: 18 BRPM | TEMPERATURE: 98 F | WEIGHT: 135.81 LBS | HEART RATE: 86 BPM | HEIGHT: 67 IN | DIASTOLIC BLOOD PRESSURE: 61 MMHG | BODY MASS INDEX: 21.31 KG/M2 | SYSTOLIC BLOOD PRESSURE: 102 MMHG | OXYGEN SATURATION: 100 %

## 2024-08-07 DIAGNOSIS — C78.7 METASTATIC COLON CANCER TO LIVER: Primary | ICD-10-CM

## 2024-08-07 DIAGNOSIS — C18.9 METASTATIC COLON CANCER TO LIVER: Primary | ICD-10-CM

## 2024-08-07 PROCEDURE — A4216 STERILE WATER/SALINE, 10 ML: HCPCS | Performed by: INTERNAL MEDICINE

## 2024-08-07 PROCEDURE — 63600175 PHARM REV CODE 636 W HCPCS: Performed by: INTERNAL MEDICINE

## 2024-08-07 PROCEDURE — 25000003 PHARM REV CODE 250: Performed by: INTERNAL MEDICINE

## 2024-08-07 RX ORDER — SODIUM CHLORIDE 0.9 % (FLUSH) 0.9 %
10 SYRINGE (ML) INJECTION
Status: DISCONTINUED | OUTPATIENT
Start: 2024-08-07 | End: 2024-08-07 | Stop reason: HOSPADM

## 2024-08-07 RX ORDER — HEPARIN 100 UNIT/ML
500 SYRINGE INTRAVENOUS
Status: DISCONTINUED | OUTPATIENT
Start: 2024-08-07 | End: 2024-08-07 | Stop reason: HOSPADM

## 2024-08-07 RX ADMIN — HEPARIN 500 UNITS: 100 SYRINGE at 02:08

## 2024-08-07 RX ADMIN — Medication 10 ML: at 02:08

## 2024-08-20 ENCOUNTER — INFUSION (OUTPATIENT)
Dept: INFUSION THERAPY | Facility: HOSPITAL | Age: 74
End: 2024-08-20
Payer: MEDICARE

## 2024-08-20 ENCOUNTER — OFFICE VISIT (OUTPATIENT)
Dept: HEMATOLOGY/ONCOLOGY | Facility: CLINIC | Age: 74
End: 2024-08-20
Payer: MEDICARE

## 2024-08-20 ENCOUNTER — LAB VISIT (OUTPATIENT)
Dept: LAB | Facility: HOSPITAL | Age: 74
End: 2024-08-20
Payer: MEDICARE

## 2024-08-20 VITALS
OXYGEN SATURATION: 100 % | HEIGHT: 67 IN | WEIGHT: 133.38 LBS | DIASTOLIC BLOOD PRESSURE: 57 MMHG | RESPIRATION RATE: 16 BRPM | HEART RATE: 82 BPM | SYSTOLIC BLOOD PRESSURE: 108 MMHG | TEMPERATURE: 98 F | BODY MASS INDEX: 20.93 KG/M2

## 2024-08-20 VITALS
HEIGHT: 67 IN | BODY MASS INDEX: 20.93 KG/M2 | TEMPERATURE: 98 F | HEART RATE: 67 BPM | RESPIRATION RATE: 18 BRPM | DIASTOLIC BLOOD PRESSURE: 56 MMHG | WEIGHT: 133.38 LBS | SYSTOLIC BLOOD PRESSURE: 95 MMHG

## 2024-08-20 DIAGNOSIS — I10 HYPERTENSION, UNSPECIFIED TYPE: ICD-10-CM

## 2024-08-20 DIAGNOSIS — R52 PAIN: ICD-10-CM

## 2024-08-20 DIAGNOSIS — C78.7 METASTATIC COLON CANCER TO LIVER: Primary | ICD-10-CM

## 2024-08-20 DIAGNOSIS — D49.9 IMMUNODEFICIENCY SECONDARY TO NEOPLASM: ICD-10-CM

## 2024-08-20 DIAGNOSIS — D84.821 IMMUNODEFICIENCY DUE TO CHEMOTHERAPY: ICD-10-CM

## 2024-08-20 DIAGNOSIS — R63.4 WEIGHT DECREASE: ICD-10-CM

## 2024-08-20 DIAGNOSIS — D64.81 ANEMIA ASSOCIATED WITH CHEMOTHERAPY: ICD-10-CM

## 2024-08-20 DIAGNOSIS — E43 SEVERE MALNUTRITION: ICD-10-CM

## 2024-08-20 DIAGNOSIS — N17.9 AKI (ACUTE KIDNEY INJURY): ICD-10-CM

## 2024-08-20 DIAGNOSIS — C18.9 METASTATIC COLON CANCER TO LIVER: Primary | ICD-10-CM

## 2024-08-20 DIAGNOSIS — R39.9 LOWER URINARY TRACT SYMPTOMS (LUTS): ICD-10-CM

## 2024-08-20 DIAGNOSIS — C78.7 METASTATIC COLON CANCER TO LIVER: ICD-10-CM

## 2024-08-20 DIAGNOSIS — C18.9 METASTATIC COLON CANCER TO LIVER: ICD-10-CM

## 2024-08-20 DIAGNOSIS — T45.1X5A IMMUNODEFICIENCY DUE TO CHEMOTHERAPY: ICD-10-CM

## 2024-08-20 DIAGNOSIS — K92.2 LOWER GI BLEED: ICD-10-CM

## 2024-08-20 DIAGNOSIS — D84.81 IMMUNODEFICIENCY SECONDARY TO NEOPLASM: ICD-10-CM

## 2024-08-20 DIAGNOSIS — K59.03 DRUG-INDUCED CONSTIPATION: ICD-10-CM

## 2024-08-20 DIAGNOSIS — Z79.899 IMMUNODEFICIENCY DUE TO CHEMOTHERAPY: ICD-10-CM

## 2024-08-20 DIAGNOSIS — T45.1X5A ANEMIA ASSOCIATED WITH CHEMOTHERAPY: ICD-10-CM

## 2024-08-20 LAB
ALBUMIN SERPL BCP-MCNC: 3.2 G/DL (ref 3.5–5.2)
ALP SERPL-CCNC: 137 U/L (ref 55–135)
ALT SERPL W/O P-5'-P-CCNC: 14 U/L (ref 10–44)
ANION GAP SERPL CALC-SCNC: 5 MMOL/L (ref 8–16)
AST SERPL-CCNC: 23 U/L (ref 10–40)
BASOPHILS # BLD AUTO: 0.03 K/UL (ref 0–0.2)
BASOPHILS NFR BLD: 0.9 % (ref 0–1.9)
BILIRUB SERPL-MCNC: 1.1 MG/DL (ref 0.1–1)
BUN SERPL-MCNC: 13 MG/DL (ref 8–23)
CALCIUM SERPL-MCNC: 9.4 MG/DL (ref 8.7–10.5)
CEA SERPL-MCNC: 7.3 NG/ML (ref 0–5)
CHLORIDE SERPL-SCNC: 110 MMOL/L (ref 95–110)
CO2 SERPL-SCNC: 25 MMOL/L (ref 23–29)
CREAT SERPL-MCNC: 1 MG/DL (ref 0.5–1.4)
DIFFERENTIAL METHOD BLD: ABNORMAL
EOSINOPHIL # BLD AUTO: 0.3 K/UL (ref 0–0.5)
EOSINOPHIL NFR BLD: 8.8 % (ref 0–8)
ERYTHROCYTE [DISTWIDTH] IN BLOOD BY AUTOMATED COUNT: 19.9 % (ref 11.5–14.5)
EST. GFR  (NO RACE VARIABLE): >60 ML/MIN/1.73 M^2
GLUCOSE SERPL-MCNC: 97 MG/DL (ref 70–110)
HCT VFR BLD AUTO: 27.4 % (ref 40–54)
HGB BLD-MCNC: 7.3 G/DL (ref 14–18)
IMM GRANULOCYTES # BLD AUTO: 0.01 K/UL (ref 0–0.04)
IMM GRANULOCYTES NFR BLD AUTO: 0.3 % (ref 0–0.5)
LYMPHOCYTES # BLD AUTO: 0.9 K/UL (ref 1–4.8)
LYMPHOCYTES NFR BLD: 26.2 % (ref 18–48)
MCH RBC QN AUTO: 22 PG (ref 27–31)
MCHC RBC AUTO-ENTMCNC: 26.6 G/DL (ref 32–36)
MCV RBC AUTO: 83 FL (ref 82–98)
MONOCYTES # BLD AUTO: 0.9 K/UL (ref 0.3–1)
MONOCYTES NFR BLD: 26.5 % (ref 4–15)
NEUTROPHILS # BLD AUTO: 1.3 K/UL (ref 1.8–7.7)
NEUTROPHILS NFR BLD: 37.3 % (ref 38–73)
NRBC BLD-RTO: 0 /100 WBC
PLATELET # BLD AUTO: 191 K/UL (ref 150–450)
PMV BLD AUTO: 11.6 FL (ref 9.2–12.9)
POTASSIUM SERPL-SCNC: 4.9 MMOL/L (ref 3.5–5.1)
PROT SERPL-MCNC: 6.7 G/DL (ref 6–8.4)
RBC # BLD AUTO: 3.32 M/UL (ref 4.6–6.2)
SODIUM SERPL-SCNC: 140 MMOL/L (ref 136–145)
WBC # BLD AUTO: 3.4 K/UL (ref 3.9–12.7)

## 2024-08-20 PROCEDURE — 96367 TX/PROPH/DG ADDL SEQ IV INF: CPT

## 2024-08-20 PROCEDURE — 96375 TX/PRO/DX INJ NEW DRUG ADDON: CPT

## 2024-08-20 PROCEDURE — 1159F MED LIST DOCD IN RCRD: CPT | Mod: CPTII,S$GLB,, | Performed by: REGISTERED NURSE

## 2024-08-20 PROCEDURE — 1160F RVW MEDS BY RX/DR IN RCRD: CPT | Mod: CPTII,S$GLB,, | Performed by: REGISTERED NURSE

## 2024-08-20 PROCEDURE — 63600175 PHARM REV CODE 636 W HCPCS: Performed by: REGISTERED NURSE

## 2024-08-20 PROCEDURE — 3074F SYST BP LT 130 MM HG: CPT | Mod: CPTII,S$GLB,, | Performed by: REGISTERED NURSE

## 2024-08-20 PROCEDURE — G2211 COMPLEX E/M VISIT ADD ON: HCPCS | Mod: S$GLB,,, | Performed by: REGISTERED NURSE

## 2024-08-20 PROCEDURE — 3288F FALL RISK ASSESSMENT DOCD: CPT | Mod: CPTII,S$GLB,, | Performed by: REGISTERED NURSE

## 2024-08-20 PROCEDURE — 96413 CHEMO IV INFUSION 1 HR: CPT

## 2024-08-20 PROCEDURE — 36415 COLL VENOUS BLD VENIPUNCTURE: CPT | Performed by: PHYSICIAN ASSISTANT

## 2024-08-20 PROCEDURE — 25000003 PHARM REV CODE 250: Performed by: REGISTERED NURSE

## 2024-08-20 PROCEDURE — 1101F PT FALLS ASSESS-DOCD LE1/YR: CPT | Mod: CPTII,S$GLB,, | Performed by: REGISTERED NURSE

## 2024-08-20 PROCEDURE — 82378 CARCINOEMBRYONIC ANTIGEN: CPT | Performed by: PHYSICIAN ASSISTANT

## 2024-08-20 PROCEDURE — 99999 PR PBB SHADOW E&M-EST. PATIENT-LVL IV: CPT | Mod: PBBFAC,,, | Performed by: REGISTERED NURSE

## 2024-08-20 PROCEDURE — 80053 COMPREHEN METABOLIC PANEL: CPT | Performed by: PHYSICIAN ASSISTANT

## 2024-08-20 PROCEDURE — 1126F AMNT PAIN NOTED NONE PRSNT: CPT | Mod: CPTII,S$GLB,, | Performed by: REGISTERED NURSE

## 2024-08-20 PROCEDURE — 85025 COMPLETE CBC W/AUTO DIFF WBC: CPT | Performed by: PHYSICIAN ASSISTANT

## 2024-08-20 PROCEDURE — 3078F DIAST BP <80 MM HG: CPT | Mod: CPTII,S$GLB,, | Performed by: REGISTERED NURSE

## 2024-08-20 PROCEDURE — 3008F BODY MASS INDEX DOCD: CPT | Mod: CPTII,S$GLB,, | Performed by: REGISTERED NURSE

## 2024-08-20 PROCEDURE — 96416 CHEMO PROLONG INFUSE W/PUMP: CPT

## 2024-08-20 PROCEDURE — 99215 OFFICE O/P EST HI 40 MIN: CPT | Mod: S$GLB,,, | Performed by: REGISTERED NURSE

## 2024-08-20 RX ORDER — SODIUM CHLORIDE 0.9 % (FLUSH) 0.9 %
10 SYRINGE (ML) INJECTION
Status: DISCONTINUED | OUTPATIENT
Start: 2024-08-20 | End: 2024-08-20 | Stop reason: HOSPADM

## 2024-08-20 RX ORDER — DIPHENHYDRAMINE HYDROCHLORIDE 50 MG/ML
50 INJECTION INTRAMUSCULAR; INTRAVENOUS ONCE AS NEEDED
Status: CANCELLED | OUTPATIENT
Start: 2024-08-20

## 2024-08-20 RX ORDER — EPINEPHRINE 0.3 MG/.3ML
0.3 INJECTION SUBCUTANEOUS ONCE AS NEEDED
Status: DISCONTINUED | OUTPATIENT
Start: 2024-08-20 | End: 2024-08-20 | Stop reason: HOSPADM

## 2024-08-20 RX ORDER — PROCHLORPERAZINE EDISYLATE 5 MG/ML
5 INJECTION INTRAMUSCULAR; INTRAVENOUS ONCE AS NEEDED
Status: DISCONTINUED | OUTPATIENT
Start: 2024-08-20 | End: 2024-08-20 | Stop reason: HOSPADM

## 2024-08-20 RX ORDER — TAMSULOSIN HYDROCHLORIDE 0.4 MG/1
0.4 CAPSULE ORAL DAILY
Qty: 30 CAPSULE | Refills: 5 | Status: SHIPPED | OUTPATIENT
Start: 2024-08-20 | End: 2025-08-20

## 2024-08-20 RX ORDER — ATROPINE SULFATE 0.4 MG/ML
0.4 INJECTION, SOLUTION ENDOTRACHEAL; INTRAMEDULLARY; INTRAMUSCULAR; INTRAVENOUS; SUBCUTANEOUS ONCE AS NEEDED
Status: CANCELLED | OUTPATIENT
Start: 2024-08-20

## 2024-08-20 RX ORDER — SODIUM CHLORIDE 0.9 % (FLUSH) 0.9 %
10 SYRINGE (ML) INJECTION
Status: CANCELLED | OUTPATIENT
Start: 2024-08-21

## 2024-08-20 RX ORDER — PROCHLORPERAZINE EDISYLATE 5 MG/ML
5 INJECTION INTRAMUSCULAR; INTRAVENOUS ONCE AS NEEDED
Status: CANCELLED
Start: 2024-08-20

## 2024-08-20 RX ORDER — HEPARIN 100 UNIT/ML
500 SYRINGE INTRAVENOUS
Status: CANCELLED | OUTPATIENT
Start: 2024-08-21

## 2024-08-20 RX ORDER — SODIUM CHLORIDE 0.9 % (FLUSH) 0.9 %
10 SYRINGE (ML) INJECTION
Status: CANCELLED | OUTPATIENT
Start: 2024-08-20

## 2024-08-20 RX ORDER — EPINEPHRINE 0.3 MG/.3ML
0.3 INJECTION SUBCUTANEOUS ONCE AS NEEDED
Status: CANCELLED | OUTPATIENT
Start: 2024-08-20

## 2024-08-20 RX ORDER — HEPARIN 100 UNIT/ML
500 SYRINGE INTRAVENOUS
Status: CANCELLED | OUTPATIENT
Start: 2024-08-20

## 2024-08-20 RX ORDER — HEPARIN 100 UNIT/ML
500 SYRINGE INTRAVENOUS
Status: DISCONTINUED | OUTPATIENT
Start: 2024-08-20 | End: 2024-08-20 | Stop reason: HOSPADM

## 2024-08-20 RX ORDER — DIPHENHYDRAMINE HYDROCHLORIDE 50 MG/ML
50 INJECTION INTRAMUSCULAR; INTRAVENOUS ONCE AS NEEDED
Status: DISCONTINUED | OUTPATIENT
Start: 2024-08-20 | End: 2024-08-20 | Stop reason: HOSPADM

## 2024-08-20 RX ORDER — ATROPINE SULFATE 0.4 MG/ML
0.4 INJECTION, SOLUTION ENDOTRACHEAL; INTRAMEDULLARY; INTRAMUSCULAR; INTRAVENOUS; SUBCUTANEOUS ONCE AS NEEDED
Status: COMPLETED | OUTPATIENT
Start: 2024-08-20 | End: 2024-08-20

## 2024-08-20 RX ADMIN — FLUOROURACIL 3695 MG: 50 INJECTION, SOLUTION INTRAVENOUS at 01:08

## 2024-08-20 RX ADMIN — ATROPINE SULFATE 0.4 MG: 0.4 INJECTION, SOLUTION INTRAVENOUS at 12:08

## 2024-08-20 RX ADMIN — DEXAMETHASONE SODIUM PHOSPHATE 0.25 MG: 4 INJECTION, SOLUTION INTRA-ARTICULAR; INTRALESIONAL; INTRAMUSCULAR; INTRAVENOUS; SOFT TISSUE at 11:08

## 2024-08-20 RX ADMIN — SODIUM CHLORIDE: 9 INJECTION, SOLUTION INTRAVENOUS at 11:08

## 2024-08-20 RX ADMIN — SODIUM CHLORIDE 240 MG: 9 INJECTION, SOLUTION INTRAVENOUS at 12:08

## 2024-08-20 NOTE — PROGRESS NOTES
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 Bonilla  12/30/2022: Y-90 to R liver  1/27/2023: Y-90 to R liver  5/17/2023: Y-90 to R liver  7/11/2023: C1 FOLFIRI + Pema  7/26/2023: C2 FOLFIRI + Pema  8/8/2023: C3 FOLFIRI + Pema  8/22/23: C4 FOLFIRI + Pema  Restaging CT CAP 9/1/23: Good response to chemo.  9/7/23: C5 FOLFIRI + Pema  ......................................  Restaging CT CAP 10/12/23: Good response to chemo  10/16/23: C8 FOLFIRI + Pema  .......................................  Restaging CT CAP 12/8/23: Good response to chemo  12/11/23: C12 FOLFIRI + Pema  .......................................  Restaging CT CAP 2/5/24: Good response to chemo  2/8/24: C16 FOLFIRI + Pema    Restaging CT CAP 4/26/24: Good response to chemo  Restaging CT CAP 6/6/24: Good response to chemo  Restaging CT CAP 8/1/24: Stable disease      Interval History:   He presents today with his daughter prior to cycle 30 of FOLFIRI (off Avastin due to GIB). Doing well overall. No signs of bleeding recently. Has mild fatigue that does not limit him. Appetite is good. No pain, nausea or change in bowel movements. No fevers, chills, SOB, CP, palpitations, or skin changes. No other concerns.     ECOG status is 1. Presents with his daughter today.    Past Medical History:   Past Medical  History:   Diagnosis Date    MARIA A (acute kidney injury) 2022    Hypertension     Metastatic colon cancer to liver 2021     Past Surgical HIstory:   Past Surgical History:   Procedure Laterality Date    COLONOSCOPY N/A 2021    Procedure: COLONOSCOPY with possible stent;  Surgeon: EDILMA Bonilla MD;  Location: [facility] ENDO (2ND FLR);  Service: Colon and Rectal;  Laterality: N/A;    COLONOSCOPY N/A 11/3/2023    Procedure: COLONOSCOPY;  Surgeon: EDILMA Bonilla MD;  Location: [facility] ENDO (4TH FLR);  Service: Endoscopy;  Laterality: N/A;  prep instructions sent to pt via portal  From: EDILMA Bonilla MD  Procedure: Colonoscopy  Diagnosis: Surveillance colonoscopy - Hx of colon cancer  Procedure Timin-12 weeks  Provider: Myself  Location[facility]  Additional Scheduling Information: No scheduling con    COLONOSCOPY N/A 7/10/2024    Procedure: COLONOSCOPY;  Surgeon: Tam Pantoja MD;  Location: [facility] ENDO (2ND FLR);  Service: Endoscopy;  Laterality: N/A;    DIAGNOSTIC LAPAROSCOPY N/A 2022    Procedure: LAPAROSCOPY, DIAGNOSTIC;  Surgeon: Adrian Rodriguez MD;  Location: [facility] OR 2ND FLR;  Service: General;  Laterality: N/A;    INSERTION OF TUNNELED CENTRAL VENOUS CATHETER (CVC) WITH SUBCUTANEOUS PORT N/A 5/3/2021    Procedure: [id number]-[id number];  Surgeon: Francisco Layton MD;  [facility];  Service: Vascular;  Laterality: N/A;    OMENTECTOMY N/A 10/20/2022    Procedure: OMENTECTOMY;  Surgeon: EDILMA Bonilla MD;  Location: [facility] OR 2ND FLR;  Service: Colon and Rectal;  Laterality: N/A;    RIGHT HEMICOLECTOMY N/A 10/20/2022    Procedure: HEMICOLECTOMY, RIGHT, extended;  Surgeon: EDILMA Bonilla MD;  [facility];  Service: Colon and Rectal;  Laterality: N/A;  Extended right hemicolectomy CONSENT IN AM     Family History:   Family History   Problem Relation Name Age of Onset    Cancer Brother         Social History:  reports that he has never smoked. He has never  used smokeless tobacco. He reports that he does not currently use alcohol. He reports that he does not use drugs.    Allergies:  Review of patient's allergies indicates:  No Known Allergies    Medications:  Current Outpatient Medications   Medication Sig Dispense Refill    amLODIPine (NORVASC) 10 MG tablet Take 1 tablet (10 mg total) by mouth once daily. 90 tablet 3    LIDOcaine-prilocaine (EMLA) cream Apply topically as needed (port application). 30 g 3    ondansetron (ZOFRAN) 4 MG tablet Take 1 tablet (4 mg total) by mouth every 8 (eight) hours as needed for Nausea. 30 tablet 3    pantoprazole (PROTONIX) 40 MG tablet Take 1 tablet (40 mg total) by mouth 2 (two) times daily before meals. 180 tablet 3    tamsulosin (FLOMAX) 0.4 mg Cap Take 1 capsule (0.4 mg total) by mouth once daily. 30 capsule 5    acetaminophen (TYLENOL) 500 MG tablet Take 1 tablet (500 mg total) by mouth every 6 (six) hours as needed for Pain (alternate with ibuprofen). (Patient not taking: Reported on 8/20/2024)  0    oxyCODONE (ROXICODONE) 5 MG immediate release tablet Take 1 tablet (5 mg total) by mouth every 6 (six) hours as needed for Pain. (Patient not taking: Reported on 8/20/2024) 20 tablet 0     No current facility-administered medications for this visit.     Review of Systems   Constitutional:  Negative for activity change, appetite change, chills, diaphoresis, fatigue, fever and unexpected weight change.   HENT:  Negative for congestion, mouth sores, nosebleeds, postnasal drip, rhinorrhea, trouble swallowing and voice change.    Eyes:  Negative for pain and visual disturbance.   Respiratory:  Negative for cough, chest tightness, shortness of breath and wheezing.    Cardiovascular:  Negative for chest pain, palpitations and leg swelling.   Gastrointestinal:  Negative for abdominal distention, abdominal pain, blood in stool, constipation, diarrhea, nausea and vomiting.   Genitourinary:  Negative for difficulty urinating, dysuria,  "flank pain, frequency, hematuria and urgency.   Musculoskeletal:  Negative for arthralgias, back pain and myalgias.   Skin:  Negative for rash and wound.   Neurological:  Negative for dizziness, facial asymmetry, weakness, light-headedness, numbness and headaches.   Psychiatric/Behavioral:  Negative for agitation, behavioral problems, confusion, decreased concentration, dysphoric mood and sleep disturbance. The patient is not nervous/anxious.    All other systems reviewed and are negative.    ECOG Performance Status: 1     Objective:      Vitals:   Vitals:    08/20/24 0959   BP: (!) 108/57   BP Location: Right arm   Patient Position: Sitting   BP Method: Medium (Automatic)   Pulse: 82   Resp: 16   Temp: 98.2 °F (36.8 °C)   TempSrc: Oral   SpO2: 100%   Weight: 60.5 kg (133 lb 6.1 oz)   Height: 5' 7" (1.702 m)       Physical Exam  Vitals reviewed.   Constitutional:       General: He is not in acute distress.     Appearance: Normal appearance. He is not ill-appearing, toxic-appearing or diaphoretic.   HENT:      Head: Normocephalic and atraumatic.      Right Ear: External ear normal.      Left Ear: External ear normal.      Nose: Nose normal.      Mouth/Throat:      Pharynx: Oropharynx is clear.   Eyes:      General: No scleral icterus.     Extraocular Movements: Extraocular movements intact.      Conjunctiva/sclera: Conjunctivae normal.      Pupils: Pupils are equal, round, and reactive to light.   Cardiovascular:      Rate and Rhythm: Normal rate and regular rhythm.      Pulses: Normal pulses.      Heart sounds: Normal heart sounds. No murmur heard.  Pulmonary:      Effort: Pulmonary effort is normal. No respiratory distress.      Breath sounds: Normal breath sounds. No wheezing.   Chest:      Comments: Port to RCW, no signs of infection.  Abdominal:      General: Abdomen is flat. Bowel sounds are normal. There is no distension.      Palpations: Abdomen is soft. There is no mass.      Tenderness: There is no " abdominal tenderness.      Comments: Vertical midline abdominal wound well healed   Musculoskeletal:         General: No swelling or deformity. Normal range of motion.      Right lower leg: No edema.      Left lower leg: No edema.   Skin:     Coloration: Skin is not jaundiced or pale.      Findings: No bruising, erythema or rash.   Neurological:      General: No focal deficit present.      Mental Status: He is alert and oriented to person, place, and time. Mental status is at baseline.      Cranial Nerves: No cranial nerve deficit.      Sensory: No sensory deficit.      Gait: Gait normal.   Psychiatric:         Mood and Affect: Mood normal.         Behavior: Behavior normal.         Thought Content: Thought content normal.         Judgment: Judgment normal.   Laboratory Data:  Lab Visit on 08/20/2024   Component Date Value Ref Range Status    WBC 08/20/2024 3.40 (L)  3.90 - 12.70 K/uL Final    RBC 08/20/2024 3.32 (L)  4.60 - 6.20 M/uL Final    Hemoglobin 08/20/2024 7.3 (L)  14.0 - 18.0 g/dL Final    Hematocrit 08/20/2024 27.4 (L)  40.0 - 54.0 % Final    MCV 08/20/2024 83  82 - 98 fL Final    MCH 08/20/2024 22.0 (L)  27.0 - 31.0 pg Final    MCHC 08/20/2024 26.6 (L)  32.0 - 36.0 g/dL Final    RDW 08/20/2024 19.9 (H)  11.5 - 14.5 % Final    Platelets 08/20/2024 191  150 - 450 K/uL Final    MPV 08/20/2024 11.6  9.2 - 12.9 fL Final    Immature Granulocytes 08/20/2024 0.3  0.0 - 0.5 % Final    Gran # (ANC) 08/20/2024 1.3 (L)  1.8 - 7.7 K/uL Final    Immature Grans (Abs) 08/20/2024 0.01  0.00 - 0.04 K/uL Final    Comment: Mild elevation in immature granulocytes is non specific and   can be seen in a variety of conditions including stress response,   acute inflammation, trauma and pregnancy. Correlation with other   laboratory and clinical findings is essential.      Lymph # 08/20/2024 0.9 (L)  1.0 - 4.8 K/uL Final    Mono # 08/20/2024 0.9  0.3 - 1.0 K/uL Final    Eos # 08/20/2024 0.3  0.0 - 0.5 K/uL Final     Baso # 08/20/2024 0.03  0.00 - 0.20 K/uL Final    nRBC 08/20/2024 0  0 /100 WBC Final    Gran % 08/20/2024 37.3 (L)  38.0 - 73.0 % Final    Lymph % 08/20/2024 26.2  18.0 - 48.0 % Final    Mono % 08/20/2024 26.5 (H)  4.0 - 15.0 % Final    Eosinophil % 08/20/2024 8.8 (H)  0.0 - 8.0 % Final    Basophil % 08/20/2024 0.9  0.0 - 1.9 % Final    Differential Method 08/20/2024 Automated   Final    Sodium 08/20/2024 140  136 - 145 mmol/L Final    Potassium 08/20/2024 4.9  3.5 - 5.1 mmol/L Final    Chloride 08/20/2024 110  95 - 110 mmol/L Final    CO2 08/20/2024 25  23 - 29 mmol/L Final    Glucose 08/20/2024 97  70 - 110 mg/dL Final    BUN 08/20/2024 13  8 - 23 mg/dL Final    Creatinine 08/20/2024 1.0  0.5 - 1.4 mg/dL Final    Calcium 08/20/2024 9.4  8.7 - 10.5 mg/dL Final    Total Protein 08/20/2024 6.7  6.0 - 8.4 g/dL Final    Albumin 08/20/2024 3.2 (L)  3.5 - 5.2 g/dL Final    Total Bilirubin 08/20/2024 1.1 (H)  0.1 - 1.0 mg/dL Final    Comment: For infants and newborns, interpretation of results should be based  on gestational age, weight and in agreement with clinical  observations.    Premature Infant recommended reference ranges:  Up to 24 hours.............<8.0 mg/dL  Up to 48 hours............<12.0 mg/dL  3-5 days..................<15.0 mg/dL  6-29 days.................<15.0 mg/dL      Alkaline Phosphatase 08/20/2024 137 (H)  55 - 135 U/L Final    AST 08/20/2024 23  10 - 40 U/L Final    ALT 08/20/2024 14  10 - 44 U/L Final    eGFR 08/20/2024 >60.0  >60 mL/min/1.73 m^2 Final    Anion Gap 08/20/2024 5 (L)  8 - 16 mmol/L Final     Assessment and Plan        1. Metastatic colon cancer to liver    2. Immunodeficiency secondary to neoplasm    3. Immunodeficiency due to chemotherapy    4. Lower GI bleed    5. Anemia associated with chemotherapy    6. MARIA A (acute kidney injury)    7. Pain    8. Weight decrease    9. Drug-induced constipation    10. Severe malnutrition    11. Lower urinary tract  symptoms (LUTS)    12. Hypertension, unspecified type      1-5.  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  Guardant 360 was negative for BRAF, VIRI, HER2 amplification.   Pretreatment CEA 57.  We had a long and sonia discussion with him about his diagnosis. Unfortunately, the disease is not curable but remains treatable and he has good performance status.   Restaging scans after 7 cycles of FOLFOX + Pema showed significant reduction in colonic mass and liver mass.   we switched to maintenance as of cycle 8 with 5FU + Pema  Restaging scans from March 2022 show stable disease.   MRI on 7/18/22 showing hepatic progression of disease in the right hepatic lobe noted on the exam. CT CAP on 8/26 shows some mild progression in both liver metastases.    Discussed case at colorectal tumor board 8/31/22 and had a diagnostic lap performed by Dr. Rodriguez on 9/7 to assess for any occult peritoneal disease. Findings from the diagnostic lap were negative. Fluid from around from around the primary mass was sent for cytology that was negative for malignancy.   Underwent primary tumor resection on 10/20/22 with Dr. Bonilla.    Meanwhile his liver mets had grown.  We discussed his case at CRC conference with plans for short term Y-90 and then restarting chemotherapy prior to considering any surgical options to his liver metastases.  He underwent Y-90 delivery on 12/30/22. Tolerated well.   CT CAP on 1/23/23 reviewed at [facility] conference with good response to Y-90, no new lesions.  Underwent second Y-90 on 1/27/23. Can consider R hepatectomy pending follow-up imaging after Y-90.     Received cycle 42 of FOLFOX (omitted oxaliplatin again starting cycle 41 due to neuropathy) on 2/9/23.  Because of 5-FU shortage nationally, we have been holding chemotherapy since mid-February 2023.  If he needs to restart chemotherapy (I.e. no plans for surgical resection), will place him on capecitabine maintenance for  duration of 5-FU shortage.     Discussed at colorectal liver mets tumor board 3/16/23.  Plan for repeat Y-90 and then consideration of surgical resection and he will meet with liver transplant team as well.  Underwent Y-90 delivery 5/17/23 with IR.     Has been on maintenance Xeloda since late April 2023.    Met with liver transplant team but ultimately opted not to proceed with transplant as he was concerned about how he would tolerate a major surgery with the life changes that would come after transplant as well. They closed out his case.    He met with Dr. Rodriguez to discuss whether surgical resection is indicated for his liver mets.  He recommended repeat MRI.  Repeat MRI unfortunately showed disease progression while on Xeloda maintenance.  Similarly his CEA garrett precipitously, all in keeping with worsening disease.    We recommended we restart IV chemotherapy with FOLFIRI + Avastin (never had irinotecan).    Because of hyperbilirubinemia he received cycle 1 with just 5-FU + Avastin on 7/11/23. Tolerated this well. Bilirubin has improved.  Received irinotecan starting with cycle 2.  Repeat CT CAP after cycle 4 shows good response to therapy.   Excellent tolerance. [facility]CRC tumor board agrees with continuation of chemotherapy.   Repeat CT CAP after cycle 7 shows stable disease, no evidence of new or worsening disease.   Repeat CT CAP after cycle 11 shows stable disease.   Repeat CT CAP after cycle 15 shows improved disease.  Repeat CT CAP after cycle 21 shows stable disease.   Repeat CT CAP after cycle 24 shows stable disease.    Hospitalized after cycle 26 and 27 (without Avastin) for hematochezia.  Colonoscopy with likely diverticular bleed.  Required 2 units of blood in last month.  Will continue to hold bevacizumab indefinitely.  Discussed that we don't have a very good way preventing this recurrence and chemo with its associated thrombocytopenia may make this more likely to occur again.  He still wishes to  proceed, which is our recommendation as well.    Repeat CT CAP after cycle 28 shows stable disease.    Proceed with cycle 30 today of FOLFIRI.  Labs adequate to proceed.     Repeat imaging after cycle 32.     RTC in 2 weeks for next cycle with lab work    Tempus xT: APC, CKS1B cng, ERCC3 cnl, PIK3CA; KENIA, TMB 12.1.  Tempus xF: APC, KRAS Q61H, PIK3CA, TP53; KEINA    6. MARIA A  -- Resolved   -- Monitor. Encouraged continued hydration particularly with working outside.     7-10. Neoplasm related pain, weight loss, constipation, malnutrition  -- Pain very well controlled. Has oxycodone 5mg to use PRN but not requiring now.  -- Following with nutrition. Encouraged increased protein. Monitor.  -- Continue Miralax daily for constipation. Doing well with this.     11, Urinary Hesitancy.  -- Continue Flomax. Refill sent today.   -- Reported improvement since starting medication.     12. Hypertension   -- BP well controlled today. Feels well.   -- Following with PCP.   -- encouraged him and his daughter to monitor BP and HR closely at home.     Patient is in agreement with the proposed treatment plan. All questions were answered to the patient's satisfaction. Pt knows to call clinic if anything is needed before the next clinic visit.    Patient discussed with collaborating physician, Dr. Schuster.    At least 40 minutes were spent today on this encounter including face to face time with the patient, data gathering/interpretation and documentation.       Natividad Maher, MSN, APRN, [facility]-  Hematology and Medical Oncology  Clinical Nurse Specialist to Dr. Schuster, Dr. Quijano & Dr. Mueller    Route Chart for Scheduling    Med Onc Chart Routing      Follow up with physician . 4 weeks with labs for chemo. 6 weeks with labs and scans 1-2 days prior to see Dr. Schuster for scan results and chemo.   Follow up with RACHEL    Infusion scheduling note   chemo every 2 weeks, pump d/c on day 3   Injection scheduling note    Labs CBC, CMP and CEA    Scheduling:  Preferred lab:  Lab interval: every 2 weeks     Imaging CT chest abdomen pelvis   1-2 days prior to MD visit in 6 weeks   Pharmacy appointment    Other referrals

## 2024-08-20 NOTE — NURSING
1103  Pt here for FOLFIRI, accompanied by daughter, no complaints or concerns, reports tolerating treatment; discussed treatment plan for today, all questions answered and pt agrees to proceed

## 2024-08-20 NOTE — PLAN OF CARE
1352  Infusion completed, pt tolerated; CADD pump connected, infusing w/out issue; discussed home care of port site/dressing, increased water hydration, when to contact MD, when to report to ED; pt and daughter verbalized understanding of all discussed and to report for pump d/c on Thurs 8/22/24 at 1146

## 2024-08-22 ENCOUNTER — INFUSION (OUTPATIENT)
Dept: INFUSION THERAPY | Facility: HOSPITAL | Age: 74
End: 2024-08-22
Payer: MEDICARE

## 2024-08-22 VITALS — HEART RATE: 80 BPM | SYSTOLIC BLOOD PRESSURE: 134 MMHG | DIASTOLIC BLOOD PRESSURE: 75 MMHG | RESPIRATION RATE: 18 BRPM

## 2024-08-22 DIAGNOSIS — C18.9 METASTATIC COLON CANCER TO LIVER: Primary | ICD-10-CM

## 2024-08-22 DIAGNOSIS — C78.7 METASTATIC COLON CANCER TO LIVER: Primary | ICD-10-CM

## 2024-08-22 PROCEDURE — 63600175 PHARM REV CODE 636 W HCPCS: Performed by: REGISTERED NURSE

## 2024-08-22 PROCEDURE — A4216 STERILE WATER/SALINE, 10 ML: HCPCS | Performed by: REGISTERED NURSE

## 2024-08-22 PROCEDURE — 25000003 PHARM REV CODE 250: Performed by: REGISTERED NURSE

## 2024-08-22 RX ORDER — SODIUM CHLORIDE 0.9 % (FLUSH) 0.9 %
10 SYRINGE (ML) INJECTION
Status: DISCONTINUED | OUTPATIENT
Start: 2024-08-22 | End: 2024-08-22 | Stop reason: HOSPADM

## 2024-08-22 RX ORDER — HEPARIN 100 UNIT/ML
500 SYRINGE INTRAVENOUS
Status: DISCONTINUED | OUTPATIENT
Start: 2024-08-22 | End: 2024-08-22 | Stop reason: HOSPADM

## 2024-08-22 RX ADMIN — HEPARIN 500 UNITS: 100 SYRINGE at 12:08

## 2024-08-22 RX ADMIN — Medication 10 ML: at 12:08

## 2024-08-29 ENCOUNTER — TELEPHONE (OUTPATIENT)
Dept: HEMATOLOGY/ONCOLOGY | Facility: CLINIC | Age: 74
End: 2024-08-29
Payer: MEDICARE

## 2024-08-29 NOTE — TELEPHONE ENCOUNTER
I attempt to reach pt to remind her of her upcoming apt ,but unfortunately I was  unable to reach patient   so i left both a voice mail and call back number.

## 2024-09-03 ENCOUNTER — OFFICE VISIT (OUTPATIENT)
Dept: HEMATOLOGY/ONCOLOGY | Facility: CLINIC | Age: 74
End: 2024-09-03
Payer: MEDICARE

## 2024-09-03 ENCOUNTER — INFUSION (OUTPATIENT)
Dept: INFUSION THERAPY | Facility: HOSPITAL | Age: 74
End: 2024-09-03
Payer: MEDICARE

## 2024-09-03 ENCOUNTER — LAB VISIT (OUTPATIENT)
Dept: LAB | Facility: HOSPITAL | Age: 74
End: 2024-09-03
Payer: MEDICARE

## 2024-09-03 VITALS — DIASTOLIC BLOOD PRESSURE: 61 MMHG | TEMPERATURE: 98 F | SYSTOLIC BLOOD PRESSURE: 112 MMHG | HEART RATE: 80 BPM

## 2024-09-03 VITALS
OXYGEN SATURATION: 100 % | DIASTOLIC BLOOD PRESSURE: 57 MMHG | BODY MASS INDEX: 20.72 KG/M2 | WEIGHT: 132.25 LBS | HEART RATE: 81 BPM | SYSTOLIC BLOOD PRESSURE: 102 MMHG

## 2024-09-03 DIAGNOSIS — R39.9 LOWER URINARY TRACT SYMPTOMS (LUTS): ICD-10-CM

## 2024-09-03 DIAGNOSIS — C18.9 METASTATIC COLON CANCER TO LIVER: ICD-10-CM

## 2024-09-03 DIAGNOSIS — C78.7 METASTATIC COLON CANCER TO LIVER: ICD-10-CM

## 2024-09-03 DIAGNOSIS — T45.1X5A ANEMIA ASSOCIATED WITH CHEMOTHERAPY: ICD-10-CM

## 2024-09-03 DIAGNOSIS — D64.81 ANEMIA ASSOCIATED WITH CHEMOTHERAPY: ICD-10-CM

## 2024-09-03 DIAGNOSIS — I10 HYPERTENSION, UNSPECIFIED TYPE: ICD-10-CM

## 2024-09-03 DIAGNOSIS — T45.1X5A IMMUNODEFICIENCY DUE TO CHEMOTHERAPY: ICD-10-CM

## 2024-09-03 DIAGNOSIS — K59.03 DRUG-INDUCED CONSTIPATION: ICD-10-CM

## 2024-09-03 DIAGNOSIS — E43 SEVERE MALNUTRITION: ICD-10-CM

## 2024-09-03 DIAGNOSIS — D84.821 IMMUNODEFICIENCY DUE TO CHEMOTHERAPY: ICD-10-CM

## 2024-09-03 DIAGNOSIS — K92.2 LOWER GI BLEED: ICD-10-CM

## 2024-09-03 DIAGNOSIS — Z79.899 IMMUNODEFICIENCY DUE TO CHEMOTHERAPY: ICD-10-CM

## 2024-09-03 DIAGNOSIS — C78.7 METASTATIC COLON CANCER TO LIVER: Primary | ICD-10-CM

## 2024-09-03 DIAGNOSIS — C18.9 METASTATIC COLON CANCER TO LIVER: Primary | ICD-10-CM

## 2024-09-03 DIAGNOSIS — R63.4 WEIGHT DECREASE: ICD-10-CM

## 2024-09-03 DIAGNOSIS — D84.81 IMMUNODEFICIENCY SECONDARY TO NEOPLASM: ICD-10-CM

## 2024-09-03 DIAGNOSIS — D49.9 IMMUNODEFICIENCY SECONDARY TO NEOPLASM: ICD-10-CM

## 2024-09-03 DIAGNOSIS — N17.9 AKI (ACUTE KIDNEY INJURY): ICD-10-CM

## 2024-09-03 DIAGNOSIS — R52 PAIN: ICD-10-CM

## 2024-09-03 LAB
ALBUMIN SERPL BCP-MCNC: 3.1 G/DL (ref 3.5–5.2)
ALP SERPL-CCNC: 149 U/L (ref 55–135)
ALT SERPL W/O P-5'-P-CCNC: 14 U/L (ref 10–44)
ANION GAP SERPL CALC-SCNC: 9 MMOL/L (ref 8–16)
AST SERPL-CCNC: 23 U/L (ref 10–40)
BILIRUB SERPL-MCNC: 1.2 MG/DL (ref 0.1–1)
BLD PROD TYP BPU: NORMAL
BLD PROD TYP BPU: NORMAL
BLOOD UNIT EXPIRATION DATE: NORMAL
BLOOD UNIT EXPIRATION DATE: NORMAL
BLOOD UNIT TYPE CODE: 6200
BLOOD UNIT TYPE CODE: 6200
BLOOD UNIT TYPE: NORMAL
BLOOD UNIT TYPE: NORMAL
BUN SERPL-MCNC: 10 MG/DL (ref 8–23)
CALCIUM SERPL-MCNC: 9.2 MG/DL (ref 8.7–10.5)
CEA SERPL-MCNC: 8.8 NG/ML (ref 0–5)
CHLORIDE SERPL-SCNC: 108 MMOL/L (ref 95–110)
CO2 SERPL-SCNC: 22 MMOL/L (ref 23–29)
CODING SYSTEM: NORMAL
CODING SYSTEM: NORMAL
CREAT SERPL-MCNC: 0.9 MG/DL (ref 0.5–1.4)
CROSSMATCH INTERPRETATION: NORMAL
CROSSMATCH INTERPRETATION: NORMAL
DISPENSE STATUS: NORMAL
DISPENSE STATUS: NORMAL
ERYTHROCYTE [DISTWIDTH] IN BLOOD BY AUTOMATED COUNT: 20.4 % (ref 11.5–14.5)
EST. GFR  (NO RACE VARIABLE): >60 ML/MIN/1.73 M^2
GLUCOSE SERPL-MCNC: 95 MG/DL (ref 70–110)
HCT VFR BLD AUTO: 24 % (ref 40–54)
HGB BLD-MCNC: 6.5 G/DL (ref 14–18)
IMM GRANULOCYTES # BLD AUTO: 0.01 K/UL (ref 0–0.04)
MCH RBC QN AUTO: 21.7 PG (ref 27–31)
MCHC RBC AUTO-ENTMCNC: 27.1 G/DL (ref 32–36)
MCV RBC AUTO: 80 FL (ref 82–98)
NEUTROPHILS # BLD AUTO: 1.7 K/UL (ref 1.8–7.7)
NUM UNITS TRANS PACKED RBC: NORMAL
NUM UNITS TRANS PACKED RBC: NORMAL
PLATELET # BLD AUTO: 179 K/UL (ref 150–450)
PMV BLD AUTO: 10.3 FL (ref 9.2–12.9)
POTASSIUM SERPL-SCNC: 4.5 MMOL/L (ref 3.5–5.1)
PROT SERPL-MCNC: 6.3 G/DL (ref 6–8.4)
RBC # BLD AUTO: 2.99 M/UL (ref 4.6–6.2)
SODIUM SERPL-SCNC: 139 MMOL/L (ref 136–145)
WBC # BLD AUTO: 3.94 K/UL (ref 3.9–12.7)

## 2024-09-03 PROCEDURE — 80053 COMPREHEN METABOLIC PANEL: CPT | Performed by: REGISTERED NURSE

## 2024-09-03 PROCEDURE — 85027 COMPLETE CBC AUTOMATED: CPT | Performed by: REGISTERED NURSE

## 2024-09-03 PROCEDURE — G2211 COMPLEX E/M VISIT ADD ON: HCPCS | Mod: S$GLB,,, | Performed by: PHYSICIAN ASSISTANT

## 2024-09-03 PROCEDURE — 36415 COLL VENOUS BLD VENIPUNCTURE: CPT | Performed by: REGISTERED NURSE

## 2024-09-03 PROCEDURE — 82378 CARCINOEMBRYONIC ANTIGEN: CPT | Performed by: REGISTERED NURSE

## 2024-09-03 PROCEDURE — 1159F MED LIST DOCD IN RCRD: CPT | Mod: CPTII,S$GLB,, | Performed by: PHYSICIAN ASSISTANT

## 2024-09-03 PROCEDURE — 36430 TRANSFUSION BLD/BLD COMPNT: CPT

## 2024-09-03 PROCEDURE — P9016 RBC LEUKOCYTES REDUCED: HCPCS | Performed by: PHYSICIAN ASSISTANT

## 2024-09-03 PROCEDURE — 99999 PR PBB SHADOW E&M-EST. PATIENT-LVL III: CPT | Mod: PBBFAC,,, | Performed by: PHYSICIAN ASSISTANT

## 2024-09-03 PROCEDURE — 1160F RVW MEDS BY RX/DR IN RCRD: CPT | Mod: CPTII,S$GLB,, | Performed by: PHYSICIAN ASSISTANT

## 2024-09-03 PROCEDURE — 3288F FALL RISK ASSESSMENT DOCD: CPT | Mod: CPTII,S$GLB,, | Performed by: PHYSICIAN ASSISTANT

## 2024-09-03 PROCEDURE — 99214 OFFICE O/P EST MOD 30 MIN: CPT | Mod: S$GLB,,, | Performed by: PHYSICIAN ASSISTANT

## 2024-09-03 PROCEDURE — 3074F SYST BP LT 130 MM HG: CPT | Mod: CPTII,S$GLB,, | Performed by: PHYSICIAN ASSISTANT

## 2024-09-03 PROCEDURE — 86920 COMPATIBILITY TEST SPIN: CPT | Performed by: PHYSICIAN ASSISTANT

## 2024-09-03 PROCEDURE — 1126F AMNT PAIN NOTED NONE PRSNT: CPT | Mod: CPTII,S$GLB,, | Performed by: PHYSICIAN ASSISTANT

## 2024-09-03 PROCEDURE — 1101F PT FALLS ASSESS-DOCD LE1/YR: CPT | Mod: CPTII,S$GLB,, | Performed by: PHYSICIAN ASSISTANT

## 2024-09-03 PROCEDURE — 3008F BODY MASS INDEX DOCD: CPT | Mod: CPTII,S$GLB,, | Performed by: PHYSICIAN ASSISTANT

## 2024-09-03 PROCEDURE — 3078F DIAST BP <80 MM HG: CPT | Mod: CPTII,S$GLB,, | Performed by: PHYSICIAN ASSISTANT

## 2024-09-03 PROCEDURE — 25000003 PHARM REV CODE 250: Performed by: PHYSICIAN ASSISTANT

## 2024-09-03 RX ORDER — HYDROCODONE BITARTRATE AND ACETAMINOPHEN 500; 5 MG/1; MG/1
TABLET ORAL ONCE
Status: CANCELLED | OUTPATIENT
Start: 2024-09-03 | End: 2024-09-03

## 2024-09-03 RX ORDER — HYDROCODONE BITARTRATE AND ACETAMINOPHEN 500; 5 MG/1; MG/1
TABLET ORAL ONCE
Status: COMPLETED | OUTPATIENT
Start: 2024-09-03 | End: 2024-09-03

## 2024-09-03 RX ORDER — ACETAMINOPHEN 325 MG/1
650 TABLET ORAL
Status: CANCELLED | OUTPATIENT
Start: 2024-09-03

## 2024-09-03 RX ORDER — ACETAMINOPHEN 325 MG/1
650 TABLET ORAL
Status: DISCONTINUED | OUTPATIENT
Start: 2024-09-03 | End: 2024-09-03 | Stop reason: HOSPADM

## 2024-09-03 RX ADMIN — SODIUM CHLORIDE: 0.9 INJECTION, SOLUTION INTRAVENOUS at 01:09

## 2024-09-03 NOTE — PROGRESS NOTES
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 Bonilla  12/30/2022: Y-90 to R liver  1/27/2023: Y-90 to R liver  5/17/2023: Y-90 to R liver  7/11/2023: C1 FOLFIRI + Pema  7/26/2023: C2 FOLFIRI + Pema  8/8/2023: C3 FOLFIRI + Pema  8/22/23: C4 FOLFIRI + Pema  Restaging CT CAP 9/1/23: Good response to chemo.  9/7/23: C5 FOLFIRI + Pema  ......................................  Restaging CT CAP 10/12/23: Good response to chemo  10/16/23: C8 FOLFIRI + Epma  .......................................  Restaging CT CAP 12/8/23: Good response to chemo  12/11/23: C12 FOLFIRI + Pema  .......................................  Restaging CT CAP 2/5/24: Good response to chemo  2/8/24: C16 FOLFIRI + Pema    Restaging CT CAP 4/26/24: Good response to chemo  Restaging CT CAP 6/6/24: Good response to chemo  Restaging CT CAP 8/1/24: Stable disease      Interval History:   He presents today with his daughter prior to cycle 31 of FOLFIRI (off Avastin due to GIB). He reports that he had a recurrence of blood in his stool after this past cycle. It began to improve but around day 7 after chemotherapy, he noticed that the bleeding had begun to increase some. He has also been experiencing dizziness. He is eating and has a good appetite. He has not fallen.  Denies any pain, nausea. Aside from this, he  has been feeling pretty good.     ECOG status is 1. Presents with his daughter today.    Past Medical History:   Past Medical History:   Diagnosis Date    MARIA A (acute kidney injury) 2022    Hypertension     Metastatic colon cancer to liver 2021     Past Surgical HIstory:   Past Surgical History:   Procedure Laterality Date    COLONOSCOPY N/A 2021    Procedure: COLONOSCOPY with possible stent;  Surgeon: EDILMA Bnoilla MD;  Location: [facility] ENDO (2ND FLR);  Service: Colon and Rectal;  Laterality: N/A;    COLONOSCOPY N/A 11/3/2023    Procedure: COLONOSCOPY;  Surgeon: EDILMA Bonilla MD;  Location: [facility] ENDO (4TH FLR);  Service: Endoscopy;  Laterality: N/A;  prep instructions sent to pt via portal  From: EDILMA Bonilla MD  Procedure: Colonoscopy  Diagnosis: Surveillance colonoscopy - Hx of colon cancer  Procedure Timin-12 weeks  Provider: Myself  [place] [facility] [place] [facility]  Additional Scheduling Information: No scheduling con    COLONOSCOPY N/A 7/10/2024    Procedure: COLONOSCOPY;  Surgeon: Tam Pantoja MD;  Location: [facility] ENDO (2ND FLR);  Service: Endoscopy;  Laterality: N/A;    DIAGNOSTIC LAPAROSCOPY N/A 2022    Procedure: LAPAROSCOPY, DIAGNOSTIC;  Surgeon: Adrain Rodriguez MD;  [facility];  Service: General;  Laterality: N/A;    INSERTION OF TUNNELED CENTRAL VENOUS CATHETER (CVC) WITH SUBCUTANEOUS PORT N/A 5/3/2021    Procedure: [id number];  Surgeon: Francisco Layton MD;  [facility];  Service: Vascular;  Laterality: N/A;    OMENTECTOMY N/A 10/20/2022    Procedure: OMENTECTOMY;  Surgeon: EDILMA Bonilla MD;  Location: [facility] OR 2ND FLR;  Service: Colon and Rectal;  Laterality: N/A;    RIGHT HEMICOLECTOMY N/A 10/20/2022    Procedure: HEMICOLECTOMY, RIGHT, extended;  Surgeon: EDILMA Bonilla MD;  [facility];  Service: Colon and Rectal;  Laterality: N/A;  Extended right hemicolectomy CONSENT IN AM     Family History:   Family History   Problem  Relation Name Age of Onset    Cancer Brother         Social History:  reports that he has never smoked. He has never used smokeless tobacco. He reports that he does not currently use alcohol. He reports that he does not use drugs.    Allergies:  Review of patient's allergies indicates:  No Known Allergies    Medications:  Current Outpatient Medications   Medication Sig Dispense Refill    acetaminophen (TYLENOL) 500 MG tablet Take 1 tablet (500 mg total) by mouth every 6 (six) hours as needed for Pain (alternate with ibuprofen). (Patient not taking: Reported on 8/20/2024)  0    amLODIPine (NORVASC) 10 MG tablet Take 1 tablet (10 mg total) by mouth once daily. 90 tablet 3    LIDOcaine-prilocaine (EMLA) cream Apply topically as needed (port application). 30 g 3    ondansetron (ZOFRAN) 4 MG tablet Take 1 tablet (4 mg total) by mouth every 8 (eight) hours as needed for Nausea. 30 tablet 3    oxyCODONE (ROXICODONE) 5 MG immediate release tablet Take 1 tablet (5 mg total) by mouth every 6 (six) hours as needed for Pain. (Patient not taking: Reported on 8/20/2024) 20 tablet 0    pantoprazole (PROTONIX) 40 MG tablet Take 1 tablet (40 mg total) by mouth 2 (two) times daily before meals. 180 tablet 3    tamsulosin (FLOMAX) 0.4 mg Cap Take 1 capsule (0.4 mg total) by mouth once daily. 30 capsule 5     No current facility-administered medications for this visit.     Review of Systems   Constitutional:  Negative for activity change, appetite change, chills, diaphoresis, fatigue, fever and unexpected weight change.   HENT:  Negative for congestion, mouth sores, nosebleeds, postnasal drip, rhinorrhea, trouble swallowing and voice change.    Eyes:  Negative for pain and visual disturbance.   Respiratory:  Negative for cough, chest tightness, shortness of breath and wheezing.    Cardiovascular:  Negative for chest pain, palpitations and leg swelling.   Gastrointestinal:  Negative for abdominal distention, abdominal pain, blood in  stool, constipation, diarrhea, nausea and vomiting.   Genitourinary:  Negative for difficulty urinating, dysuria, flank pain, frequency, hematuria and urgency.   Musculoskeletal:  Negative for arthralgias, back pain and myalgias.   Skin:  Negative for rash and wound.   Neurological:  Negative for dizziness, facial asymmetry, weakness, light-headedness, numbness and headaches.   Psychiatric/Behavioral:  Negative for agitation, behavioral problems, confusion, decreased concentration, dysphoric mood and sleep disturbance. The patient is not nervous/anxious.    All other systems reviewed and are negative.    ECOG Performance Status: 1     Objective:      Vitals:   Vitals:    09/03/24 1122   BP: (!) 102/57   BP Location: Left arm   Patient Position: Sitting   BP Method: Large (Automatic)   Pulse: 81   SpO2: 100%   Weight: 60 kg (132 lb 4.4 oz)         Physical Exam  Vitals reviewed.   Constitutional:       General: He is not in acute distress.     Appearance: Normal appearance. He is not ill-appearing, toxic-appearing or diaphoretic.   HENT:      Head: Normocephalic and atraumatic.      Right Ear: External ear normal.      Left Ear: External ear normal.      Nose: Nose normal.      Mouth/Throat:      Pharynx: Oropharynx is clear.   Eyes:      General: No scleral icterus.     Extraocular Movements: Extraocular movements intact.      Conjunctiva/sclera: Conjunctivae normal.      Pupils: Pupils are equal, round, and reactive to light.   Cardiovascular:      Rate and Rhythm: Normal rate and regular rhythm.      Pulses: Normal pulses.      Heart sounds: Normal heart sounds. No murmur heard.  Pulmonary:      Effort: Pulmonary effort is normal. No respiratory distress.      Breath sounds: Normal breath sounds. No wheezing.   Chest:      Comments: Port to RCW, no signs of infection.  Abdominal:      General: Abdomen is flat. Bowel sounds are normal. There is no distension.      Palpations: Abdomen is soft. There is no mass.       Tenderness: There is no abdominal tenderness.      Comments: Vertical midline abdominal wound well healed   Musculoskeletal:         General: No swelling or deformity. Normal range of motion.      Right lower leg: No edema.      Left lower leg: No edema.   Skin:     Coloration: Skin is not jaundiced or pale.      Findings: No bruising, erythema or rash.   Neurological:      General: No focal deficit present.      Mental Status: He is alert and oriented to person, place, and time. Mental status is at baseline.      Cranial Nerves: No cranial nerve deficit.      Sensory: No sensory deficit.      Gait: Gait normal.   Psychiatric:         Mood and Affect: Mood normal.         Behavior: Behavior normal.         Thought Content: Thought content normal.         Judgment: Judgment normal.     Laboratory Data:  Lab Visit on 09/03/2024   Component Date Value Ref Range Status    WBC 09/03/2024 3.94  3.90 - 12.70 K/uL Final    RBC 09/03/2024 2.99 (L)  4.60 - 6.20 M/uL Final    Hemoglobin 09/03/2024 6.5 (L)  14.0 - 18.0 g/dL Final    Hematocrit 09/03/2024 24.0 (L)  40.0 - 54.0 % Final    MCV 09/03/2024 80 (L)  82 - 98 fL Final    MCH 09/03/2024 21.7 (L)  27.0 - 31.0 pg Final    MCHC 09/03/2024 27.1 (L)  32.0 - 36.0 g/dL Final    RDW 09/03/2024 20.4 (H)  11.5 - 14.5 % Final    Platelets 09/03/2024 179  150 - 450 K/uL Final    MPV 09/03/2024 10.3  9.2 - 12.9 fL Final    Gran # (ANC) 09/03/2024 1.7 (L)  1.8 - 7.7 K/uL Final    Comment: The ANC is based on a white cell differential from an   automated cell counter. It has not been microscopically   reviewed for the presence of abnormal cells. Clinical   correlation is required.      Immature Grans (Abs) 09/03/2024 0.01  0.00 - 0.04 K/uL Final    Comment: Mild elevation in immature granulocytes is non specific and   can be seen in a variety of conditions including stress response,   acute inflammation, trauma and pregnancy. Correlation with other   laboratory and clinical findings is  essential.     Lab Visit on 09/03/2024   Component Date Value Ref Range Status    Specimen UA 09/03/2024 Urine, Clean Catch   Final    Color, UA 09/03/2024 Yellow  Yellow, Straw, [place] Final    Appearance, UA 09/03/2024 Clear  Clear Final    pH, UA 09/03/2024 6.0  5.0 - 8.0 Final    Specific Gravity, UA 09/03/2024 1.010  1.005 - 1.030 Final    Protein, UA 09/03/2024 Negative  Negative Final    Comment: Recommend a 24 hour urine protein or a urine   protein/creatinine ratio if globulin induced proteinuria is  clinically suspected.      Glucose, UA 09/03/2024 Negative  Negative Final    Ketones, UA 09/03/2024 Negative  Negative Final    Bilirubin (UA) 09/03/2024 Negative  Negative Final    Occult Blood UA 09/03/2024 Negative  Negative Final    Nitrite, UA 09/03/2024 Negative  Negative Final    Leukocytes, UA 09/03/2024 Negative  Negative Final     Assessment and Plan        1. Metastatic colon cancer to liver    2. Immunodeficiency secondary to neoplasm    3. Immunodeficiency due to chemotherapy    4. Lower GI bleed    5. Anemia associated with chemotherapy    6. MARIA A (acute kidney injury)    7. Pain    8. Weight decrease    9. Drug-induced constipation    10. Severe malnutrition    11. Lower urinary tract symptoms (LUTS)    12. Hypertension, unspecified type            1-5.  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  Guardant 360 was negative for BRAF, VIRI, HER2 amplification.   Pretreatment CEA 57.  We had a long and sonia discussion with him about his diagnosis. Unfortunately, the disease is not curable but remains treatable and he has good performance status.   Restaging scans after 7 cycles of FOLFOX + Pema showed significant reduction in colonic mass and liver mass.   we switched to maintenance as of cycle 8 with 5FU + Pema  Restaging scans from March 2022 show stable disease.   MRI on 7/18/22 showing hepatic progression of disease in the right hepatic lobe noted on the exam. CT  CAP on 8/26 shows some mild progression in both liver metastases.    Discussed case at colorectal tumor board 8/31/22 and had a diagnostic lap performed by Dr. Rodriguez on 9/7 to assess for any occult peritoneal disease. Findings from the diagnostic lap were negative. Fluid from around from around the primary mass was sent for cytology that was negative for malignancy.   Underwent primary tumor resection on 10/20/22 with Dr. Bonilla.    Meanwhile his liver mets had grown.  We discussed his case at [facility] conference with plans for short term Y-90 and then restarting chemotherapy prior to considering any surgical options to his liver metastases.  He underwent Y-90 delivery on 12/30/22. Tolerated well.   CT CAP on 1/23/23 reviewed at [facility] conference with good response to Y-90, no new lesions.  Underwent second Y-90 on 1/27/23. Can consider R hepatectomy pending follow-up imaging after Y-90.     Received cycle 42 of FOLFOX (omitted oxaliplatin again starting cycle 41 due to neuropathy) on 2/9/23.  Because of 5-FU shortage nationally, we have been holding chemotherapy since mid-February 2023.  If he needs to restart chemotherapy (I.e. no plans for surgical resection), will place him on capecitabine maintenance for duration of 5-FU shortage.     Discussed at colorectal liver mets tumor board 3/16/23.  Plan for repeat Y-90 and then consideration of surgical resection and he will meet with liver transplant team as well.  Underwent Y-90 delivery 5/17/23 with IR.     Has been on maintenance Xeloda since late April 2023.    Met with liver transplant team but ultimately opted not to proceed with transplant as he was concerned about how he would tolerate a major surgery with the life changes that would come after transplant as well. They closed out his case.    He met with Dr. Rodriguez to discuss whether surgical resection is indicated for his liver mets.  He recommended repeat MRI.  Repeat MRI unfortunately showed disease  progression while on Xeloda maintenance.  Similarly his CEA garrett precipitously, all in keeping with worsening disease.    We recommended we restart IV chemotherapy with FOLFIRI + Avastin (never had irinotecan).    Because of hyperbilirubinemia he received cycle 1 with just 5-FU + Avastin on 7/11/23. Tolerated this well. Bilirubin has improved.  Received irinotecan starting with cycle 2.  Repeat CT CAP after cycle 4 shows good response to therapy.   Excellent tolerance. [facility] tumor board agrees with continuation of chemotherapy.   Repeat CT CAP after cycle 7 shows stable disease, no evidence of new or worsening disease.   Repeat CT CAP after cycle 11 shows stable disease.   Repeat CT CAP after cycle 15 shows improved disease.  Repeat CT CAP after cycle 21 shows stable disease.   Repeat CT CAP after cycle 24 shows stable disease.    Hospitalized after cycle 26 and 27 (without Avastin) for hematochezia.  Colonoscopy with likely diverticular bleed.  Required 2 units of blood in last month.  Will continue to hold bevacizumab indefinitely.  Discussed that we don't have a very good way preventing this recurrence and chemo with its associated thrombocytopenia may make this more likely to occur again.  He still wishes to proceed, which is my recommendation as well.    Repeat CT CAP after cycle 28 shows stable disease and recc to continue with chemotherapy. He is s/p 30 cycles.     Doing fairly today. He reports increased blood in his stool and feelings of dizziness. He has not fallen. Eating   I have reviewed his CBC and CMP. Hb 6.5 today. Given his low Hb level and feelings of dizziness, I will hold chemotherapy today and proceed with a blood transfusion of 2 units of packed RBCs. This was discussed with the patient and his daughter. He is agreeable.   - Hold cycle 31 of FOLFIRI.  - Proceed with blood transfusion as discussed above    Repeat imaging after cycle 32.     -RTC in 1 week with lab work only  - RTC in 2 weeks and  consideration for next cycle with lab work    Tempus xT: APC, CKS1B cng, ERCC3 cnl, PIK3CA; KENIA, TMB 12.1.  Tempus xF: APC, KRAS Q61H, PIK3CA, TP53; KENIA    6. MARIA A  -- Resolved   -- Monitor. Encouraged continued hydration particularly with working outside.     7-10. Neoplasm related pain, weight loss, constipation, malnutrition  -- Pain very well controlled. Has oxycodone 5mg to use PRN but not requiring now.  -- Following with nutrition. Encouraged increased protein. Monitor.  -- Continue Miralax daily for constipation. Doing well with this.     11, Urinary Hesitancy.  -- Continue Flomax.   -- Reported improvement since starting medication.     12. Hypertension   -- BP on the lower end today. Most likely due to a low Hb. Will transfuse 2 units of packed RBCs today. Otherwise, feels well.   -- Following with PCP.   -- encouraged him and his daughter to monitor BP and HR closely at home.     Patient is in agreement with the proposed treatment plan. All questions were answered to the patient's satisfaction. Pt knows to call clinic if anything is needed before the next clinic visit.    Pte and family members displayed understanding of the above encounter and treatment plan. All thoughtful questions were answered to their satisfaction. Pte was advised to notify the care team or proceed to the ER if signs and symptoms worsen.       FAN Meier, PA-C [facility]  Dept of Hematology/Oncology  PAKATHY to GI Oncology team         Route Chart for Scheduling    Med Onc Chart Routing  Urgent    Follow up with physician 6 weeks. See Dr Schuster in 6 weeks with lab work, CT CAP and treatment discussion with FOLFIRI plus avastin   Follow up with RACHEL 2 weeks and 4 weeks. FOLFIRI +/- avastin   Infusion scheduling note    Injection scheduling note Please schedule lab work only in 1 week.   Labs CBC, CMP, CEA and urinalysis   Scheduling:  Preferred lab:  Lab interval: every 2 weeks  Please schedule CBC and cmp alone next  week. No clinic visit needed   Imaging CT chest abdomen pelvis   Please schedule in 6 week prior to clinic visit with Dr Schuster   Pharmacy appointment    Other referrals

## 2024-09-03 NOTE — PLAN OF CARE
Problem: Chemotherapy Effects  Goal: Anemia Symptom Improvement  Outcome: Progressing  Goal: Safety Maintained  Outcome: Progressing  Goal: Absence of Hematuria  Outcome: Progressing  Goal: Nausea and Vomiting Relief  Outcome: Progressing  Goal: Neurotoxicity Symptom Control  Outcome: Progressing  Goal: Absence of Infection  Outcome: Progressing  Goal: Absence of Bleeding  Outcome: Progressing     Problem: Fatigue  Goal: Improved Activity Tolerance  Outcome: Progressing     Problem: Anemia  Goal: Anemia Symptom Improvement  Outcome: Progressing     Problem: Infection  Goal: Absence of Infection Signs and Symptoms  Outcome: Progressing   Pt received 2 units of PRBCs d/t HgB 6.5 HCT 24 via chest port without adverse effects, pt will have a repeat cbc to check levels next week. VS WNL, discharged AAOX4 and ambulatory with daughter

## 2024-09-09 ENCOUNTER — LAB VISIT (OUTPATIENT)
Dept: LAB | Facility: HOSPITAL | Age: 74
End: 2024-09-09
Payer: MEDICARE

## 2024-09-09 DIAGNOSIS — T45.1X5A ANEMIA ASSOCIATED WITH CHEMOTHERAPY: ICD-10-CM

## 2024-09-09 DIAGNOSIS — C78.7 METASTATIC COLON CANCER TO LIVER: ICD-10-CM

## 2024-09-09 DIAGNOSIS — D64.81 ANEMIA ASSOCIATED WITH CHEMOTHERAPY: ICD-10-CM

## 2024-09-09 DIAGNOSIS — C18.9 METASTATIC COLON CANCER TO LIVER: ICD-10-CM

## 2024-09-09 DIAGNOSIS — K92.2 LOWER GI BLEED: ICD-10-CM

## 2024-09-09 LAB
ALBUMIN SERPL BCP-MCNC: 3.3 G/DL (ref 3.5–5.2)
ALP SERPL-CCNC: 182 U/L (ref 55–135)
ALT SERPL W/O P-5'-P-CCNC: 15 U/L (ref 10–44)
ANION GAP SERPL CALC-SCNC: 11 MMOL/L (ref 8–16)
AST SERPL-CCNC: 29 U/L (ref 10–40)
BASOPHILS # BLD AUTO: 0.08 K/UL (ref 0–0.2)
BASOPHILS NFR BLD: 1.1 % (ref 0–1.9)
BILIRUB SERPL-MCNC: 1.3 MG/DL (ref 0.1–1)
BUN SERPL-MCNC: 10 MG/DL (ref 8–23)
CALCIUM SERPL-MCNC: 9.7 MG/DL (ref 8.7–10.5)
CEA SERPL-MCNC: 12.6 NG/ML (ref 0–5)
CHLORIDE SERPL-SCNC: 105 MMOL/L (ref 95–110)
CO2 SERPL-SCNC: 24 MMOL/L (ref 23–29)
CREAT SERPL-MCNC: 1 MG/DL (ref 0.5–1.4)
DIFFERENTIAL METHOD BLD: ABNORMAL
EOSINOPHIL # BLD AUTO: 0.2 K/UL (ref 0–0.5)
EOSINOPHIL NFR BLD: 2.8 % (ref 0–8)
ERYTHROCYTE [DISTWIDTH] IN BLOOD BY AUTOMATED COUNT: 21.3 % (ref 11.5–14.5)
EST. GFR  (NO RACE VARIABLE): >60 ML/MIN/1.73 M^2
GLUCOSE SERPL-MCNC: 79 MG/DL (ref 70–110)
HCT VFR BLD AUTO: 37.8 % (ref 40–54)
HGB BLD-MCNC: 10.4 G/DL (ref 14–18)
IMM GRANULOCYTES # BLD AUTO: 0.03 K/UL (ref 0–0.04)
IMM GRANULOCYTES NFR BLD AUTO: 0.4 % (ref 0–0.5)
LYMPHOCYTES # BLD AUTO: 1.1 K/UL (ref 1–4.8)
LYMPHOCYTES NFR BLD: 15.3 % (ref 18–48)
MCH RBC QN AUTO: 23 PG (ref 27–31)
MCHC RBC AUTO-ENTMCNC: 27.5 G/DL (ref 32–36)
MCV RBC AUTO: 84 FL (ref 82–98)
MONOCYTES # BLD AUTO: 1.5 K/UL (ref 0.3–1)
MONOCYTES NFR BLD: 20.6 % (ref 4–15)
NEUTROPHILS # BLD AUTO: 4.2 K/UL (ref 1.8–7.7)
NEUTROPHILS NFR BLD: 59.8 % (ref 38–73)
NRBC BLD-RTO: 0 /100 WBC
PLATELET # BLD AUTO: 263 K/UL (ref 150–450)
PMV BLD AUTO: 10.5 FL (ref 9.2–12.9)
POTASSIUM SERPL-SCNC: 4.2 MMOL/L (ref 3.5–5.1)
PROT SERPL-MCNC: 7 G/DL (ref 6–8.4)
RBC # BLD AUTO: 4.52 M/UL (ref 4.6–6.2)
SODIUM SERPL-SCNC: 140 MMOL/L (ref 136–145)
WBC # BLD AUTO: 7.05 K/UL (ref 3.9–12.7)

## 2024-09-09 PROCEDURE — 85025 COMPLETE CBC W/AUTO DIFF WBC: CPT | Performed by: PHYSICIAN ASSISTANT

## 2024-09-09 PROCEDURE — 36415 COLL VENOUS BLD VENIPUNCTURE: CPT | Performed by: PHYSICIAN ASSISTANT

## 2024-09-09 PROCEDURE — 82378 CARCINOEMBRYONIC ANTIGEN: CPT | Performed by: PHYSICIAN ASSISTANT

## 2024-09-09 PROCEDURE — 80053 COMPREHEN METABOLIC PANEL: CPT | Performed by: PHYSICIAN ASSISTANT

## 2024-09-16 ENCOUNTER — OFFICE VISIT (OUTPATIENT)
Dept: HEMATOLOGY/ONCOLOGY | Facility: CLINIC | Age: 74
End: 2024-09-16
Payer: MEDICARE

## 2024-09-16 ENCOUNTER — LAB VISIT (OUTPATIENT)
Dept: LAB | Facility: HOSPITAL | Age: 74
End: 2024-09-16
Payer: MEDICARE

## 2024-09-16 VITALS
DIASTOLIC BLOOD PRESSURE: 75 MMHG | HEIGHT: 67 IN | WEIGHT: 130.38 LBS | TEMPERATURE: 98 F | SYSTOLIC BLOOD PRESSURE: 113 MMHG | HEART RATE: 73 BPM | BODY MASS INDEX: 20.46 KG/M2

## 2024-09-16 DIAGNOSIS — K59.03 DRUG-INDUCED CONSTIPATION: ICD-10-CM

## 2024-09-16 DIAGNOSIS — D49.9 IMMUNODEFICIENCY SECONDARY TO NEOPLASM: ICD-10-CM

## 2024-09-16 DIAGNOSIS — T45.1X5A IMMUNODEFICIENCY DUE TO CHEMOTHERAPY: ICD-10-CM

## 2024-09-16 DIAGNOSIS — N17.9 AKI (ACUTE KIDNEY INJURY): ICD-10-CM

## 2024-09-16 DIAGNOSIS — C78.7 METASTATIC COLON CANCER TO LIVER: Primary | ICD-10-CM

## 2024-09-16 DIAGNOSIS — T45.1X5A ANEMIA ASSOCIATED WITH CHEMOTHERAPY: ICD-10-CM

## 2024-09-16 DIAGNOSIS — C78.7 METASTATIC COLON CANCER TO LIVER: ICD-10-CM

## 2024-09-16 DIAGNOSIS — K92.2 LOWER GI BLEED: ICD-10-CM

## 2024-09-16 DIAGNOSIS — D84.821 IMMUNODEFICIENCY DUE TO CHEMOTHERAPY: ICD-10-CM

## 2024-09-16 DIAGNOSIS — C18.9 METASTATIC COLON CANCER TO LIVER: ICD-10-CM

## 2024-09-16 DIAGNOSIS — D84.81 IMMUNODEFICIENCY SECONDARY TO NEOPLASM: ICD-10-CM

## 2024-09-16 DIAGNOSIS — C18.9 METASTATIC COLON CANCER TO LIVER: Primary | ICD-10-CM

## 2024-09-16 DIAGNOSIS — R52 PAIN: ICD-10-CM

## 2024-09-16 DIAGNOSIS — E43 SEVERE MALNUTRITION: ICD-10-CM

## 2024-09-16 DIAGNOSIS — Z79.899 IMMUNODEFICIENCY DUE TO CHEMOTHERAPY: ICD-10-CM

## 2024-09-16 DIAGNOSIS — R63.4 WEIGHT DECREASE: ICD-10-CM

## 2024-09-16 DIAGNOSIS — I10 HYPERTENSION, UNSPECIFIED TYPE: ICD-10-CM

## 2024-09-16 DIAGNOSIS — R39.9 LOWER URINARY TRACT SYMPTOMS (LUTS): ICD-10-CM

## 2024-09-16 DIAGNOSIS — D64.81 ANEMIA ASSOCIATED WITH CHEMOTHERAPY: ICD-10-CM

## 2024-09-16 LAB
ALBUMIN SERPL BCP-MCNC: 3 G/DL (ref 3.5–5.2)
ALP SERPL-CCNC: 206 U/L (ref 55–135)
ALT SERPL W/O P-5'-P-CCNC: 18 U/L (ref 10–44)
ANION GAP SERPL CALC-SCNC: 6 MMOL/L (ref 8–16)
AST SERPL-CCNC: 37 U/L (ref 10–40)
BILIRUB SERPL-MCNC: 1.1 MG/DL (ref 0.1–1)
BUN SERPL-MCNC: 11 MG/DL (ref 8–23)
CALCIUM SERPL-MCNC: 9.1 MG/DL (ref 8.7–10.5)
CEA SERPL-MCNC: 14.3 NG/ML (ref 0–5)
CHLORIDE SERPL-SCNC: 104 MMOL/L (ref 95–110)
CO2 SERPL-SCNC: 24 MMOL/L (ref 23–29)
CREAT SERPL-MCNC: 0.8 MG/DL (ref 0.5–1.4)
ERYTHROCYTE [DISTWIDTH] IN BLOOD BY AUTOMATED COUNT: 21.2 % (ref 11.5–14.5)
EST. GFR  (NO RACE VARIABLE): >60 ML/MIN/1.73 M^2
GLUCOSE SERPL-MCNC: 102 MG/DL (ref 70–110)
HCT VFR BLD AUTO: 35.6 % (ref 40–54)
HGB BLD-MCNC: 10 G/DL (ref 14–18)
IMM GRANULOCYTES # BLD AUTO: 0.05 K/UL (ref 0–0.04)
MCH RBC QN AUTO: 23 PG (ref 27–31)
MCHC RBC AUTO-ENTMCNC: 28.1 G/DL (ref 32–36)
MCV RBC AUTO: 82 FL (ref 82–98)
NEUTROPHILS # BLD AUTO: 6.1 K/UL (ref 1.8–7.7)
PLATELET # BLD AUTO: 175 K/UL (ref 150–450)
PMV BLD AUTO: 11.9 FL (ref 9.2–12.9)
POTASSIUM SERPL-SCNC: 4.3 MMOL/L (ref 3.5–5.1)
PROT SERPL-MCNC: 6.9 G/DL (ref 6–8.4)
RBC # BLD AUTO: 4.35 M/UL (ref 4.6–6.2)
SODIUM SERPL-SCNC: 134 MMOL/L (ref 136–145)
WBC # BLD AUTO: 9.79 K/UL (ref 3.9–12.7)

## 2024-09-16 PROCEDURE — 1159F MED LIST DOCD IN RCRD: CPT | Mod: CPTII,S$GLB,, | Performed by: REGISTERED NURSE

## 2024-09-16 PROCEDURE — 36415 COLL VENOUS BLD VENIPUNCTURE: CPT | Performed by: REGISTERED NURSE

## 2024-09-16 PROCEDURE — 1101F PT FALLS ASSESS-DOCD LE1/YR: CPT | Mod: CPTII,S$GLB,, | Performed by: REGISTERED NURSE

## 2024-09-16 PROCEDURE — 3288F FALL RISK ASSESSMENT DOCD: CPT | Mod: CPTII,S$GLB,, | Performed by: REGISTERED NURSE

## 2024-09-16 PROCEDURE — 99999 PR PBB SHADOW E&M-EST. PATIENT-LVL III: CPT | Mod: PBBFAC,,, | Performed by: REGISTERED NURSE

## 2024-09-16 PROCEDURE — G2211 COMPLEX E/M VISIT ADD ON: HCPCS | Mod: S$GLB,,, | Performed by: REGISTERED NURSE

## 2024-09-16 PROCEDURE — 85027 COMPLETE CBC AUTOMATED: CPT | Performed by: REGISTERED NURSE

## 2024-09-16 PROCEDURE — 3074F SYST BP LT 130 MM HG: CPT | Mod: CPTII,S$GLB,, | Performed by: REGISTERED NURSE

## 2024-09-16 PROCEDURE — 1125F AMNT PAIN NOTED PAIN PRSNT: CPT | Mod: CPTII,S$GLB,, | Performed by: REGISTERED NURSE

## 2024-09-16 PROCEDURE — 99215 OFFICE O/P EST HI 40 MIN: CPT | Mod: S$GLB,,, | Performed by: REGISTERED NURSE

## 2024-09-16 PROCEDURE — 82378 CARCINOEMBRYONIC ANTIGEN: CPT | Performed by: REGISTERED NURSE

## 2024-09-16 PROCEDURE — 1160F RVW MEDS BY RX/DR IN RCRD: CPT | Mod: CPTII,S$GLB,, | Performed by: REGISTERED NURSE

## 2024-09-16 PROCEDURE — 3008F BODY MASS INDEX DOCD: CPT | Mod: CPTII,S$GLB,, | Performed by: REGISTERED NURSE

## 2024-09-16 PROCEDURE — 3078F DIAST BP <80 MM HG: CPT | Mod: CPTII,S$GLB,, | Performed by: REGISTERED NURSE

## 2024-09-16 PROCEDURE — 80053 COMPREHEN METABOLIC PANEL: CPT | Performed by: REGISTERED NURSE

## 2024-09-16 RX ORDER — SODIUM CHLORIDE 0.9 % (FLUSH) 0.9 %
10 SYRINGE (ML) INJECTION
Status: CANCELLED | OUTPATIENT
Start: 2024-09-18

## 2024-09-16 RX ORDER — AMLODIPINE BESYLATE 10 MG/1
10 TABLET ORAL DAILY
Qty: 90 TABLET | Refills: 3 | Status: SHIPPED | OUTPATIENT
Start: 2024-09-16

## 2024-09-16 RX ORDER — TRAMADOL HYDROCHLORIDE 50 MG/1
50 TABLET ORAL EVERY 6 HOURS PRN
Qty: 30 TABLET | Refills: 0 | Status: SHIPPED | OUTPATIENT
Start: 2024-09-16

## 2024-09-16 RX ORDER — HEPARIN 100 UNIT/ML
500 SYRINGE INTRAVENOUS
Status: CANCELLED | OUTPATIENT
Start: 2024-09-18

## 2024-09-16 RX ORDER — HEPARIN 100 UNIT/ML
500 SYRINGE INTRAVENOUS
Status: CANCELLED | OUTPATIENT
Start: 2024-09-16

## 2024-09-16 RX ORDER — DIPHENHYDRAMINE HYDROCHLORIDE 50 MG/ML
50 INJECTION INTRAMUSCULAR; INTRAVENOUS ONCE AS NEEDED
Status: CANCELLED | OUTPATIENT
Start: 2024-09-16

## 2024-09-16 RX ORDER — PROCHLORPERAZINE EDISYLATE 5 MG/ML
5 INJECTION INTRAMUSCULAR; INTRAVENOUS ONCE AS NEEDED
Status: CANCELLED
Start: 2024-09-16

## 2024-09-16 RX ORDER — EPINEPHRINE 0.3 MG/.3ML
0.3 INJECTION SUBCUTANEOUS ONCE AS NEEDED
Status: CANCELLED | OUTPATIENT
Start: 2024-09-16

## 2024-09-16 RX ORDER — ATROPINE SULFATE 0.4 MG/ML
0.4 INJECTION, SOLUTION ENDOTRACHEAL; INTRAMEDULLARY; INTRAMUSCULAR; INTRAVENOUS; SUBCUTANEOUS ONCE AS NEEDED
Status: CANCELLED | OUTPATIENT
Start: 2024-09-16

## 2024-09-16 RX ORDER — SODIUM CHLORIDE 0.9 % (FLUSH) 0.9 %
10 SYRINGE (ML) INJECTION
Status: CANCELLED | OUTPATIENT
Start: 2024-09-16

## 2024-09-16 NOTE — PROGRESS NOTES
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 Bonilla  12/30/2022: Y-90 to R liver  1/27/2023: Y-90 to R liver  5/17/2023: Y-90 to R liver  7/11/2023: C1 FOLFIRI + Pema  7/26/2023: C2 FOLFIRI + Pema  8/8/2023: C3 FOLFIRI + Pema  8/22/23: C4 FOLFIRI + Pema  Restaging CT CAP 9/1/23: Good response to chemo.  9/7/23: C5 FOLFIRI + Pema  ......................................  Restaging CT CAP 10/12/23: Good response to chemo  10/16/23: C8 FOLFIRI + Pema  .......................................  Restaging CT CAP 12/8/23: Good response to chemo  12/11/23: C12 FOLFIRI + Pema  .......................................  Restaging CT CAP 2/5/24: Good response to chemo  2/8/24: C16 FOLFIRI + Pema    Restaging CT CAP 4/26/24: Good response to chemo  Restaging CT CAP 6/6/24: Good response to chemo  Restaging CT CAP 8/1/24: Stable disease      Interval History:   He presents today with his daughter prior to cycle 31 of FOLFIRI (off Avastin due to GIB). Appetite and energy are good. Staying well hydrated. No recent blood in his stool since chemotherapy held last visit. No dizziness or falls. Reports sharp pain to lower back for the last week or so. No known injuries or changes in activity. No nausea. Bowel movements unchanged. No other concerns today.     ECOG status is 1. Presents  with his daughter today.    Past Medical History:   Past Medical History:   Diagnosis Date    MARIA A (acute kidney injury) 2022    Hypertension     Metastatic colon cancer to liver 2021     Past Surgical HIstory:   Past Surgical History:   Procedure Laterality Date    COLONOSCOPY N/A 2021    Procedure: COLONOSCOPY with possible stent;  Surgeon: EDILMA Bonilla MD;  Location: [facility] ENDO (2ND FLR);  Service: Colon and Rectal;  Laterality: N/A;    COLONOSCOPY N/A 11/3/2023    Procedure: COLONOSCOPY;  Surgeon: EDILMA Bonilla MD;  Location: [facility] ENDO (4TH FLR);  Service: Endoscopy;  Laterality: N/A;  prep instructions sent to pt via portal  From: EDILMA Bonilla MD  Procedure: Colonoscopy  Diagnosis: Surveillance colonoscopy - Hx of colon cancer  Procedure Timin-12 weeks  Provider: Myself  [place] [facility]  Additional Scheduling Information: No scheduling con    COLONOSCOPY N/A 7/10/2024    Procedure: COLONOSCOPY;  Surgeon: Tam Pantoja MD;  Location: [facility] ENDO (2ND FLR);  Service: Endoscopy;  Laterality: N/A;    DIAGNOSTIC LAPAROSCOPY N/A 2022    Procedure: LAPAROSCOPY, DIAGNOSTIC;  Surgeon: Adrian Rodriguez MD;  Location: [facility] OR 2ND FLR;  Service: General;  Laterality: N/A;    INSERTION OF TUNNELED CENTRAL VENOUS CATHETER (CVC) WITH SUBCUTANEOUS PORT N/A 5/3/2021    Procedure: [id number];  Surgeon: Francisco Layton MD;  Location: [facility] OR 2ND FLR;  Service: Vascular;  Laterality: N/A;    OMENTECTOMY N/A 10/20/2022    Procedure: OMENTECTOMY;  Surgeon: EDILMA Bonilla MD;  [facility];  Service: Colon and Rectal;  Laterality: N/A;    RIGHT HEMICOLECTOMY N/A 10/20/2022    Procedure: HEMICOLECTOMY, RIGHT, extended;  Surgeon: EDILMA Bonilla MD;  Location: [facility] OR 2ND FLR;  Service: Colon and Rectal;  Laterality: N/A;  Extended right hemicolectomy CONSENT IN AM     Family History:   Family History   Problem Relation Name Age of Onset    Cancer Brother         Social  History:  reports that he has never smoked. He has never used smokeless tobacco. He reports that he does not currently use alcohol. He reports that he does not use drugs.    Allergies:  Review of patient's allergies indicates:  No Known Allergies    Medications:  Current Outpatient Medications   Medication Sig Dispense Refill    LIDOcaine-prilocaine (EMLA) cream Apply topically as needed (port application). 30 g 3    ondansetron (ZOFRAN) 4 MG tablet Take 1 tablet (4 mg total) by mouth every 8 (eight) hours as needed for Nausea. 30 tablet 3    pantoprazole (PROTONIX) 40 MG tablet Take 1 tablet (40 mg total) by mouth 2 (two) times daily before meals. 180 tablet 3    tamsulosin (FLOMAX) 0.4 mg Cap Take 1 capsule (0.4 mg total) by mouth once daily. 30 capsule 5    acetaminophen (TYLENOL) 500 MG tablet Take 1 tablet (500 mg total) by mouth every 6 (six) hours as needed for Pain (alternate with ibuprofen). (Patient not taking: Reported on 8/20/2024)  0    amLODIPine (NORVASC) 10 MG tablet Take 1 tablet (10 mg total) by mouth once daily. 90 tablet 3    oxyCODONE (ROXICODONE) 5 MG immediate release tablet Take 1 tablet (5 mg total) by mouth every 6 (six) hours as needed for Pain. (Patient not taking: Reported on 8/20/2024) 20 tablet 0    traMADoL (ULTRAM) 50 mg tablet Take 1 tablet (50 mg total) by mouth every 6 (six) hours as needed for Pain. 30 tablet 0     No current facility-administered medications for this visit.     Review of Systems   Constitutional:  Negative for activity change, appetite change, chills, diaphoresis, fatigue, fever and unexpected weight change.   HENT:  Negative for congestion, mouth sores, nosebleeds, postnasal drip, rhinorrhea, trouble swallowing and voice change.    Eyes:  Negative for pain and visual disturbance.   Respiratory:  Negative for cough, chest tightness, shortness of breath and wheezing.    Cardiovascular:  Negative for chest pain, palpitations and leg swelling.   Gastrointestinal:   "Negative for abdominal distention, abdominal pain, blood in stool, constipation, diarrhea, nausea and vomiting.   Genitourinary:  Negative for difficulty urinating, dysuria, flank pain, frequency, hematuria and urgency.   Musculoskeletal:  Negative for arthralgias, back pain and myalgias.   Skin:  Negative for rash and wound.   Neurological:  Negative for dizziness, facial asymmetry, weakness, light-headedness, numbness and headaches.   Psychiatric/Behavioral:  Negative for agitation, behavioral problems, confusion, decreased concentration, dysphoric mood and sleep disturbance. The patient is not nervous/anxious.    All other systems reviewed and are negative.    ECOG Performance Status: 1     Objective:      Vitals:   Vitals:    09/16/24 1418   BP: 113/75   BP Location: Left arm   Patient Position: Sitting   BP Method: Medium (Automatic)   Pulse: 73   Temp: 98.1 °F (36.7 °C)   TempSrc: Temporal   Weight: 59.1 kg (130 lb 6.4 oz)   Height: 5' 7" (1.702 m)         Physical Exam  Vitals reviewed.   Constitutional:       General: He is not in acute distress.     Appearance: Normal appearance. He is not ill-appearing, toxic-appearing or diaphoretic.   HENT:      Head: Normocephalic and atraumatic.      Right Ear: External ear normal.      Left Ear: External ear normal.      Nose: Nose normal.      Mouth/Throat:      Pharynx: Oropharynx is clear.   Eyes:      General: No scleral icterus.     Extraocular Movements: Extraocular movements intact.      Conjunctiva/sclera: Conjunctivae normal.      Pupils: Pupils are equal, round, and reactive to light.   Cardiovascular:      Rate and Rhythm: Normal rate and regular rhythm.      Pulses: Normal pulses.      Heart sounds: Normal heart sounds. No murmur heard.  Pulmonary:      Effort: Pulmonary effort is normal. No respiratory distress.      Breath sounds: Normal breath sounds. No wheezing.   Chest:      Comments: Port to RCW, no signs of infection.  Abdominal:      General: " Abdomen is flat. Bowel sounds are normal. There is no distension.      Palpations: Abdomen is soft. There is no mass.      Tenderness: There is no abdominal tenderness.      Comments: Vertical midline abdominal wound well healed   Musculoskeletal:         General: No swelling or deformity. Normal range of motion.      Right lower leg: No edema.      Left lower leg: No edema.   Skin:     Coloration: Skin is not jaundiced or pale.      Findings: No bruising, erythema or rash.   Neurological:      General: No focal deficit present.      Mental Status: He is alert and oriented to person, place, and time. Mental status is at baseline.      Cranial Nerves: No cranial nerve deficit.      Sensory: No sensory deficit.      Gait: Gait normal.   Psychiatric:         Mood and Affect: Mood normal.         Behavior: Behavior normal.         Thought Content: Thought content normal.         Judgment: Judgment normal.     Laboratory Data:  Lab Visit on 09/16/2024   Component Date Value Ref Range Status    CEA 09/16/2024 14.3 (H)  0.0 - 5.0 ng/mL Final    Comment: CEA Normal Range:  Non-Smokers: 0-3.0 ng/mL  Smokers:     0-5.0 ng/mL    The testing method is a chemiluminescent microparticle immunoassay   manufactured by [employer] and performed on the [employer]   or   [employer] system. Values obtained with different assay manufacturers   for   methods may be different and cannot be used interchangeably.      Sodium 09/16/2024 134 (L)  136 - 145 mmol/L Final    Potassium 09/16/2024 4.3  3.5 - 5.1 mmol/L Final    Chloride 09/16/2024 104  95 - 110 mmol/L Final    CO2 09/16/2024 24  23 - 29 mmol/L Final    Glucose 09/16/2024 102  70 - 110 mg/dL Final    BUN 09/16/2024 11  8 - 23 mg/dL Final    Creatinine 09/16/2024 0.8  0.5 - 1.4 mg/dL Final    Calcium 09/16/2024 9.1  8.7 - 10.5 mg/dL Final    Total Protein 09/16/2024 6.9  6.0 - 8.4 g/dL Final    Albumin 09/16/2024 3.0 (L)  3.5 - 5.2 g/dL Final    Total Bilirubin 09/16/2024 1.1  (H)  0.1 - 1.0 mg/dL Final    Comment: For infants and newborns, interpretation of results should be based  on gestational age, weight and in agreement with clinical  observations.    Premature Infant recommended reference ranges:  Up to 24 hours.............<8.0 mg/dL  Up to 48 hours............<12.0 mg/dL  3-5 days..................<15.0 mg/dL  6-29 days.................<15.0 mg/dL      Alkaline Phosphatase 09/16/2024 206 (H)  55 - 135 U/L Final    AST 09/16/2024 37  10 - 40 U/L Final    ALT 09/16/2024 18  10 - 44 U/L Final    eGFR 09/16/2024 >60.0  >60 mL/min/1.73 m^2 Final    Anion Gap 09/16/2024 6 (L)  8 - 16 mmol/L Final    WBC 09/16/2024 9.79  3.90 - 12.70 K/uL Final    RBC 09/16/2024 4.35 (L)  4.60 - 6.20 M/uL Final    Hemoglobin 09/16/2024 10.0 (L)  14.0 - 18.0 g/dL Final    Hematocrit 09/16/2024 35.6 (L)  40.0 - 54.0 % Final    MCV 09/16/2024 82  82 - 98 fL Final    MCH 09/16/2024 23.0 (L)  27.0 - 31.0 pg Final    MCHC 09/16/2024 28.1 (L)  32.0 - 36.0 g/dL Final    RDW 09/16/2024 21.2 (H)  11.5 - 14.5 % Final    Platelets 09/16/2024 175  150 - 450 K/uL Final    MPV 09/16/2024 11.9  9.2 - 12.9 fL Final    Gran # (ANC) 09/16/2024 6.1  1.8 - 7.7 K/uL Final    Comment: The ANC is based on a white cell differential from an   automated cell counter. It has not been microscopically   reviewed for the presence of abnormal cells. Clinical   correlation is required.      Immature Grans (Abs) 09/16/2024 0.05 (H)  0.00 - 0.04 K/uL Final    Comment: Mild elevation in immature granulocytes is non specific and   can be seen in a variety of conditions including stress response,   acute inflammation, trauma and pregnancy. Correlation with other   laboratory and clinical findings is essential.       Assessment and Plan        1. Metastatic colon cancer to liver    2. Immunodeficiency secondary to neoplasm    3. Immunodeficiency due to chemotherapy    4. Lower GI bleed    5. Anemia associated with chemotherapy    6. MARIA A  (acute kidney injury)    7. Pain    8. Weight decrease    9. Drug-induced constipation    10. Severe malnutrition    11. Lower urinary tract symptoms (LUTS)    12. Hypertension, unspecified type        1-5.  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  Guardant 360 was negative for BRAF, VIRI, HER2 amplification.   Pretreatment CEA 57.  We had a long and sonia discussion with him about his diagnosis. Unfortunately, the disease is not curable but remains treatable and he has good performance status.   Restaging scans after 7 cycles of FOLFOX + Pema showed significant reduction in colonic mass and liver mass.   we switched to maintenance as of cycle 8 with 5FU + Pema  Restaging scans from March 2022 show stable disease.   MRI on 7/18/22 showing hepatic progression of disease in the right hepatic lobe noted on the exam. CT CAP on 8/26 shows some mild progression in both liver metastases.    Discussed case at colorectal tumor board 8/31/22 and had a diagnostic lap performed by Dr. Rodriguez on 9/7 to assess for any occult peritoneal disease. Findings from the diagnostic lap were negative. Fluid from around from around the primary mass was sent for cytology that was negative for malignancy.   Underwent primary tumor resection on 10/20/22 with Dr. Bonilla.    Meanwhile his liver mets had grown.  We discussed his case at [facility] conference with plans for short term Y-90 and then restarting chemotherapy prior to considering any surgical options to his liver metastases.  He underwent Y-90 delivery on 12/30/22. Tolerated well.   CT CAP on 1/23/23 reviewed at [facility] conference with good response to Y-90, no new lesions.  Underwent second Y-90 on 1/27/23. Can consider R hepatectomy pending follow-up imaging after Y-90.     Received cycle 42 of FOLFOX (omitted oxaliplatin again starting cycle 41 due to neuropathy) on 2/9/23.  Because of 5-FU shortage nationally, we have been holding chemotherapy since  mid-February 2023.  If he needs to restart chemotherapy (I.e. no plans for surgical resection), will place him on capecitabine maintenance for duration of 5-FU shortage.     Discussed at colorectal liver mets tumor board 3/16/23.  Plan for repeat Y-90 and then consideration of surgical resection and he will meet with liver transplant team as well.  Underwent Y-90 delivery 5/17/23 with IR.     Has been on maintenance Xeloda since late April 2023.    Met with liver transplant team but ultimately opted not to proceed with transplant as he was concerned about how he would tolerate a major surgery with the life changes that would come after transplant as well. They closed out his case.    He met with Dr. Rodriguez to discuss whether surgical resection is indicated for his liver mets.  He recommended repeat MRI.  Repeat MRI unfortunately showed disease progression while on Xeloda maintenance.  Similarly his CEA garrett precipitously, all in keeping with worsening disease.    We recommended we restart IV chemotherapy with FOLFIRI + Avastin (never had irinotecan).    Because of hyperbilirubinemia he received cycle 1 with just 5-FU + Avastin on 7/11/23. Tolerated this well. Bilirubin has improved.  Received irinotecan starting with cycle 2.  Repeat CT CAP after cycle 4 shows good response to therapy.   Excellent tolerance. [facility] tumor board agrees with continuation of chemotherapy.   Repeat CT CAP after cycle 7 shows stable disease, no evidence of new or worsening disease.   Repeat CT CAP after cycle 11 shows stable disease.   Repeat CT CAP after cycle 15 shows improved disease.  Repeat CT CAP after cycle 21 shows stable disease.   Repeat CT CAP after cycle 24 shows stable disease.    Hospitalized after cycle 26 and 27 (without Avastin) for hematochezia.  Colonoscopy with likely diverticular bleed.  Required 2 units of blood in last month.  Will continue to hold bevacizumab indefinitely.  Discussed that we don't have a very good  way preventing this recurrence and chemo with its associated thrombocytopenia may make this more likely to occur again.  He still wishes to proceed, which is my recommendation as well.    Repeat CT CAP after cycle 28 shows stable disease and recc to continue with chemotherapy. He is s/p 30 cycles.     Treatment held x 2 weeks ago and blood transfusion given for Hgb 6.5.   Labs reviewed, adequate for treatment. Hgb improved and clinically feeling better.   - Proceed with cycle 31 of FOLFIRI tomorrow.     Repeat imaging after cycle 32.     - RTC in 2 weeks and consideration for next cycle with lab work    Tempus xT: APC, CKS1B cng, ERCC3 cnl, PIK3CA; KENIA, TMB 12.1.  Tempus xF: APC, KRAS Q61H, PIK3CA, TP53; KENIA    6. MARIA A  -- Resolved   -- Monitor. Encouraged continued hydration particularly with working outside.     7-10. Neoplasm related pain, weight loss, constipation, malnutrition  -- Pain to back worsening. Will trial tramadol for pain as he does not think he needs oxycodone at this time. Discussed other pain relief modalities including topical agents, lidocaine patches, or heating pads.   -- Following with nutrition. Encouraged increased protein. Monitor.  -- Continue Miralax daily for constipation. Doing well with this.     11, Urinary Hesitancy.  -- Continue Flomax.   -- Reported improvement since starting medication.     12. Hypertension   -- BP controlled today  -- Following with PCP.   -- encouraged him and his daughter to monitor BP and HR closely at home.     Patient is in agreement with the proposed treatment plan. All questions were answered to the patient's satisfaction. Pt knows to call clinic if anything is needed before the next clinic visit.    Patient discussed with collaborating physician, Dr. Schuster.    At least 40 minutes were spent today on this encounter including face to face time with the patient, data gathering/interpretation and documentation.       Natividad Maher, MSN, APRN,  [facility]-  Hematology and Medical Oncology  Clinical Nurse Specialist to Dr. Schuster, Dr. Quijano & Dr. Mueller    Route Chart for Scheduling    Med Onc Chart Routing      Follow up with physician 4 weeks and 6 weeks. with labs for chemo. keep scans as scheduled prior to visit in 4 weeks.   Follow up with RACHEL . With labs for chemo   Infusion scheduling note   chemo every 2 weeks, pump d/c on day 3   Injection scheduling note    Labs CBC, CMP and CEA   Scheduling:  Preferred lab:  Lab interval: every 2 weeks     Imaging    Pharmacy appointment    Other referrals

## 2024-09-17 ENCOUNTER — INFUSION (OUTPATIENT)
Dept: INFUSION THERAPY | Facility: HOSPITAL | Age: 74
End: 2024-09-17
Payer: MEDICARE

## 2024-09-17 VITALS
RESPIRATION RATE: 18 BRPM | DIASTOLIC BLOOD PRESSURE: 66 MMHG | SYSTOLIC BLOOD PRESSURE: 105 MMHG | TEMPERATURE: 98 F | BODY MASS INDEX: 20.45 KG/M2 | HEIGHT: 67 IN | WEIGHT: 130.31 LBS | HEART RATE: 88 BPM

## 2024-09-17 DIAGNOSIS — C18.9 METASTATIC COLON CANCER TO LIVER: Primary | ICD-10-CM

## 2024-09-17 DIAGNOSIS — C78.7 METASTATIC COLON CANCER TO LIVER: Primary | ICD-10-CM

## 2024-09-17 PROCEDURE — 96367 TX/PROPH/DG ADDL SEQ IV INF: CPT

## 2024-09-17 PROCEDURE — 96413 CHEMO IV INFUSION 1 HR: CPT

## 2024-09-17 PROCEDURE — 63600175 PHARM REV CODE 636 W HCPCS: Performed by: REGISTERED NURSE

## 2024-09-17 PROCEDURE — 96416 CHEMO PROLONG INFUSE W/PUMP: CPT

## 2024-09-17 PROCEDURE — 25000003 PHARM REV CODE 250: Performed by: REGISTERED NURSE

## 2024-09-17 PROCEDURE — 96375 TX/PRO/DX INJ NEW DRUG ADDON: CPT

## 2024-09-17 RX ORDER — ATROPINE SULFATE 0.4 MG/ML
0.4 INJECTION, SOLUTION ENDOTRACHEAL; INTRAMEDULLARY; INTRAMUSCULAR; INTRAVENOUS; SUBCUTANEOUS ONCE AS NEEDED
Status: COMPLETED | OUTPATIENT
Start: 2024-09-17 | End: 2024-09-17

## 2024-09-17 RX ORDER — SODIUM CHLORIDE 0.9 % (FLUSH) 0.9 %
10 SYRINGE (ML) INJECTION
Status: DISCONTINUED | OUTPATIENT
Start: 2024-09-17 | End: 2024-09-17 | Stop reason: HOSPADM

## 2024-09-17 RX ORDER — EPINEPHRINE 0.3 MG/.3ML
0.3 INJECTION SUBCUTANEOUS ONCE AS NEEDED
Status: DISCONTINUED | OUTPATIENT
Start: 2024-09-17 | End: 2024-09-17 | Stop reason: HOSPADM

## 2024-09-17 RX ORDER — DIPHENHYDRAMINE HYDROCHLORIDE 50 MG/ML
50 INJECTION INTRAMUSCULAR; INTRAVENOUS ONCE AS NEEDED
Status: DISCONTINUED | OUTPATIENT
Start: 2024-09-17 | End: 2024-09-17 | Stop reason: HOSPADM

## 2024-09-17 RX ORDER — HEPARIN 100 UNIT/ML
500 SYRINGE INTRAVENOUS
Status: DISCONTINUED | OUTPATIENT
Start: 2024-09-17 | End: 2024-09-17 | Stop reason: HOSPADM

## 2024-09-17 RX ORDER — PROCHLORPERAZINE EDISYLATE 5 MG/ML
5 INJECTION INTRAMUSCULAR; INTRAVENOUS ONCE AS NEEDED
Status: DISCONTINUED | OUTPATIENT
Start: 2024-09-17 | End: 2024-09-17 | Stop reason: HOSPADM

## 2024-09-17 RX ADMIN — SODIUM CHLORIDE 240 MG: 9 INJECTION, SOLUTION INTRAVENOUS at 08:09

## 2024-09-17 RX ADMIN — DEXAMETHASONE SODIUM PHOSPHATE 0.25 MG: 4 INJECTION, SOLUTION INTRA-ARTICULAR; INTRALESIONAL; INTRAMUSCULAR; INTRAVENOUS; SOFT TISSUE at 08:09

## 2024-09-17 RX ADMIN — FLUOROURACIL 3695 MG: 50 INJECTION, SOLUTION INTRAVENOUS at 10:09

## 2024-09-17 RX ADMIN — ATROPINE SULFATE 0.4 MG: 0.4 INJECTION, SOLUTION INTRAVENOUS at 08:09

## 2024-09-19 ENCOUNTER — INFUSION (OUTPATIENT)
Dept: INFUSION THERAPY | Facility: HOSPITAL | Age: 74
End: 2024-09-19
Payer: MEDICARE

## 2024-09-19 VITALS
HEART RATE: 83 BPM | DIASTOLIC BLOOD PRESSURE: 59 MMHG | SYSTOLIC BLOOD PRESSURE: 97 MMHG | TEMPERATURE: 98 F | RESPIRATION RATE: 18 BRPM

## 2024-09-19 DIAGNOSIS — C78.7 METASTATIC COLON CANCER TO LIVER: Primary | ICD-10-CM

## 2024-09-19 DIAGNOSIS — C18.9 METASTATIC COLON CANCER TO LIVER: Primary | ICD-10-CM

## 2024-09-19 PROCEDURE — 63600175 PHARM REV CODE 636 W HCPCS: Performed by: REGISTERED NURSE

## 2024-09-19 RX ORDER — SODIUM CHLORIDE 0.9 % (FLUSH) 0.9 %
10 SYRINGE (ML) INJECTION
Status: DISCONTINUED | OUTPATIENT
Start: 2024-09-19 | End: 2024-09-19 | Stop reason: HOSPADM

## 2024-09-19 RX ORDER — HEPARIN 100 UNIT/ML
500 SYRINGE INTRAVENOUS
Status: DISCONTINUED | OUTPATIENT
Start: 2024-09-19 | End: 2024-09-19 | Stop reason: HOSPADM

## 2024-09-19 RX ADMIN — HEPARIN 500 UNITS: 100 SYRINGE at 09:09

## 2024-09-19 NOTE — PLAN OF CARE
0911-Pt tolerated home infusion well, no complaints or complications reported, VSS, vessel empty upon arrival. Pt disconnected from pump, positive blood return noted. Pt aware to call provider with any questions or concerns, aware of upcoming appts, ambulatory from clinic with steady gait, no distress noted.

## 2024-10-01 ENCOUNTER — OFFICE VISIT (OUTPATIENT)
Dept: HEMATOLOGY/ONCOLOGY | Facility: CLINIC | Age: 74
End: 2024-10-01
Payer: MEDICARE

## 2024-10-01 ENCOUNTER — INFUSION (OUTPATIENT)
Dept: INFUSION THERAPY | Facility: HOSPITAL | Age: 74
End: 2024-10-01
Payer: MEDICARE

## 2024-10-01 ENCOUNTER — LAB VISIT (OUTPATIENT)
Dept: LAB | Facility: HOSPITAL | Age: 74
End: 2024-10-01
Payer: MEDICARE

## 2024-10-01 VITALS
OXYGEN SATURATION: 98 % | RESPIRATION RATE: 18 BRPM | HEART RATE: 70 BPM | DIASTOLIC BLOOD PRESSURE: 56 MMHG | WEIGHT: 127.75 LBS | SYSTOLIC BLOOD PRESSURE: 97 MMHG | TEMPERATURE: 98 F | HEIGHT: 67 IN | BODY MASS INDEX: 20.05 KG/M2

## 2024-10-01 VITALS
TEMPERATURE: 99 F | HEIGHT: 67 IN | DIASTOLIC BLOOD PRESSURE: 67 MMHG | BODY MASS INDEX: 20.05 KG/M2 | HEART RATE: 78 BPM | WEIGHT: 127.75 LBS | SYSTOLIC BLOOD PRESSURE: 102 MMHG

## 2024-10-01 DIAGNOSIS — C78.7 METASTATIC COLON CANCER TO LIVER: Primary | ICD-10-CM

## 2024-10-01 DIAGNOSIS — R63.4 WEIGHT DECREASE: ICD-10-CM

## 2024-10-01 DIAGNOSIS — R52 PAIN: ICD-10-CM

## 2024-10-01 DIAGNOSIS — C18.9 METASTATIC COLON CANCER TO LIVER: Primary | ICD-10-CM

## 2024-10-01 DIAGNOSIS — D84.81 IMMUNODEFICIENCY SECONDARY TO NEOPLASM: ICD-10-CM

## 2024-10-01 DIAGNOSIS — R39.9 LOWER URINARY TRACT SYMPTOMS (LUTS): ICD-10-CM

## 2024-10-01 DIAGNOSIS — T45.1X5A IMMUNODEFICIENCY DUE TO CHEMOTHERAPY: ICD-10-CM

## 2024-10-01 DIAGNOSIS — K59.03 DRUG-INDUCED CONSTIPATION: ICD-10-CM

## 2024-10-01 DIAGNOSIS — Z79.899 IMMUNODEFICIENCY DUE TO CHEMOTHERAPY: ICD-10-CM

## 2024-10-01 DIAGNOSIS — K92.2 LOWER GI BLEED: ICD-10-CM

## 2024-10-01 DIAGNOSIS — D64.81 ANEMIA ASSOCIATED WITH CHEMOTHERAPY: ICD-10-CM

## 2024-10-01 DIAGNOSIS — I10 HYPERTENSION, UNSPECIFIED TYPE: ICD-10-CM

## 2024-10-01 DIAGNOSIS — D84.821 IMMUNODEFICIENCY DUE TO CHEMOTHERAPY: ICD-10-CM

## 2024-10-01 DIAGNOSIS — C78.7 METASTATIC COLON CANCER TO LIVER: ICD-10-CM

## 2024-10-01 DIAGNOSIS — D49.9 IMMUNODEFICIENCY SECONDARY TO NEOPLASM: ICD-10-CM

## 2024-10-01 DIAGNOSIS — T45.1X5A ANEMIA ASSOCIATED WITH CHEMOTHERAPY: ICD-10-CM

## 2024-10-01 DIAGNOSIS — N17.9 AKI (ACUTE KIDNEY INJURY): ICD-10-CM

## 2024-10-01 DIAGNOSIS — C18.9 METASTATIC COLON CANCER TO LIVER: ICD-10-CM

## 2024-10-01 LAB
ALBUMIN SERPL BCP-MCNC: 3 G/DL (ref 3.5–5.2)
ALP SERPL-CCNC: 193 U/L (ref 55–135)
ALT SERPL W/O P-5'-P-CCNC: 17 U/L (ref 10–44)
ANION GAP SERPL CALC-SCNC: 7 MMOL/L (ref 8–16)
AST SERPL-CCNC: 31 U/L (ref 10–40)
BILIRUB SERPL-MCNC: 1 MG/DL (ref 0.1–1)
BUN SERPL-MCNC: 14 MG/DL (ref 8–23)
CALCIUM SERPL-MCNC: 9.1 MG/DL (ref 8.7–10.5)
CEA SERPL-MCNC: 32.9 NG/ML (ref 0–5)
CHLORIDE SERPL-SCNC: 105 MMOL/L (ref 95–110)
CO2 SERPL-SCNC: 25 MMOL/L (ref 23–29)
CREAT SERPL-MCNC: 0.9 MG/DL (ref 0.5–1.4)
ERYTHROCYTE [DISTWIDTH] IN BLOOD BY AUTOMATED COUNT: 21.1 % (ref 11.5–14.5)
EST. GFR  (NO RACE VARIABLE): >60 ML/MIN/1.73 M^2
GLUCOSE SERPL-MCNC: 89 MG/DL (ref 70–110)
HCT VFR BLD AUTO: 33.1 % (ref 40–54)
HGB BLD-MCNC: 9.2 G/DL (ref 14–18)
IMM GRANULOCYTES # BLD AUTO: 0.01 K/UL (ref 0–0.04)
MCH RBC QN AUTO: 23.1 PG (ref 27–31)
MCHC RBC AUTO-ENTMCNC: 27.8 G/DL (ref 32–36)
MCV RBC AUTO: 83 FL (ref 82–98)
NEUTROPHILS # BLD AUTO: 1.8 K/UL (ref 1.8–7.7)
PLATELET # BLD AUTO: 211 K/UL (ref 150–450)
PMV BLD AUTO: 10 FL (ref 9.2–12.9)
POTASSIUM SERPL-SCNC: 4.4 MMOL/L (ref 3.5–5.1)
PROT SERPL-MCNC: 7 G/DL (ref 6–8.4)
RBC # BLD AUTO: 3.98 M/UL (ref 4.6–6.2)
SODIUM SERPL-SCNC: 137 MMOL/L (ref 136–145)
WBC # BLD AUTO: 3.74 K/UL (ref 3.9–12.7)

## 2024-10-01 PROCEDURE — 63600175 PHARM REV CODE 636 W HCPCS: Performed by: PHYSICIAN ASSISTANT

## 2024-10-01 PROCEDURE — 3288F FALL RISK ASSESSMENT DOCD: CPT | Mod: CPTII,S$GLB,, | Performed by: PHYSICIAN ASSISTANT

## 2024-10-01 PROCEDURE — 96375 TX/PRO/DX INJ NEW DRUG ADDON: CPT

## 2024-10-01 PROCEDURE — 1159F MED LIST DOCD IN RCRD: CPT | Mod: CPTII,S$GLB,, | Performed by: PHYSICIAN ASSISTANT

## 2024-10-01 PROCEDURE — 3078F DIAST BP <80 MM HG: CPT | Mod: CPTII,S$GLB,, | Performed by: PHYSICIAN ASSISTANT

## 2024-10-01 PROCEDURE — 99999 PR PBB SHADOW E&M-EST. PATIENT-LVL III: CPT | Mod: PBBFAC,,, | Performed by: PHYSICIAN ASSISTANT

## 2024-10-01 PROCEDURE — 96413 CHEMO IV INFUSION 1 HR: CPT

## 2024-10-01 PROCEDURE — 80053 COMPREHEN METABOLIC PANEL: CPT | Performed by: REGISTERED NURSE

## 2024-10-01 PROCEDURE — 1101F PT FALLS ASSESS-DOCD LE1/YR: CPT | Mod: CPTII,S$GLB,, | Performed by: PHYSICIAN ASSISTANT

## 2024-10-01 PROCEDURE — 96367 TX/PROPH/DG ADDL SEQ IV INF: CPT

## 2024-10-01 PROCEDURE — G2211 COMPLEX E/M VISIT ADD ON: HCPCS | Mod: S$GLB,,, | Performed by: PHYSICIAN ASSISTANT

## 2024-10-01 PROCEDURE — 96416 CHEMO PROLONG INFUSE W/PUMP: CPT

## 2024-10-01 PROCEDURE — 99215 OFFICE O/P EST HI 40 MIN: CPT | Mod: S$GLB,,, | Performed by: PHYSICIAN ASSISTANT

## 2024-10-01 PROCEDURE — 3008F BODY MASS INDEX DOCD: CPT | Mod: CPTII,S$GLB,, | Performed by: PHYSICIAN ASSISTANT

## 2024-10-01 PROCEDURE — 82378 CARCINOEMBRYONIC ANTIGEN: CPT | Performed by: REGISTERED NURSE

## 2024-10-01 PROCEDURE — 36415 COLL VENOUS BLD VENIPUNCTURE: CPT | Performed by: REGISTERED NURSE

## 2024-10-01 PROCEDURE — 3074F SYST BP LT 130 MM HG: CPT | Mod: CPTII,S$GLB,, | Performed by: PHYSICIAN ASSISTANT

## 2024-10-01 PROCEDURE — 1160F RVW MEDS BY RX/DR IN RCRD: CPT | Mod: CPTII,S$GLB,, | Performed by: PHYSICIAN ASSISTANT

## 2024-10-01 PROCEDURE — 85027 COMPLETE CBC AUTOMATED: CPT | Performed by: REGISTERED NURSE

## 2024-10-01 PROCEDURE — 1126F AMNT PAIN NOTED NONE PRSNT: CPT | Mod: CPTII,S$GLB,, | Performed by: PHYSICIAN ASSISTANT

## 2024-10-01 PROCEDURE — 25000003 PHARM REV CODE 250: Performed by: PHYSICIAN ASSISTANT

## 2024-10-01 RX ORDER — EPINEPHRINE 0.3 MG/.3ML
0.3 INJECTION SUBCUTANEOUS ONCE AS NEEDED
Status: DISCONTINUED | OUTPATIENT
Start: 2024-10-01 | End: 2024-10-01 | Stop reason: HOSPADM

## 2024-10-01 RX ORDER — ATROPINE SULFATE 0.4 MG/ML
0.4 INJECTION, SOLUTION ENDOTRACHEAL; INTRAMEDULLARY; INTRAMUSCULAR; INTRAVENOUS; SUBCUTANEOUS ONCE AS NEEDED
Status: COMPLETED | OUTPATIENT
Start: 2024-10-01 | End: 2024-10-01

## 2024-10-01 RX ORDER — PROCHLORPERAZINE EDISYLATE 5 MG/ML
5 INJECTION INTRAMUSCULAR; INTRAVENOUS ONCE AS NEEDED
Status: CANCELLED
Start: 2024-10-01

## 2024-10-01 RX ORDER — SODIUM CHLORIDE 0.9 % (FLUSH) 0.9 %
10 SYRINGE (ML) INJECTION
Status: DISCONTINUED | OUTPATIENT
Start: 2024-10-01 | End: 2024-10-01 | Stop reason: HOSPADM

## 2024-10-01 RX ORDER — HEPARIN 100 UNIT/ML
500 SYRINGE INTRAVENOUS
Status: DISCONTINUED | OUTPATIENT
Start: 2024-10-01 | End: 2024-10-01 | Stop reason: HOSPADM

## 2024-10-01 RX ORDER — SODIUM CHLORIDE 0.9 % (FLUSH) 0.9 %
10 SYRINGE (ML) INJECTION
Status: CANCELLED | OUTPATIENT
Start: 2024-10-02

## 2024-10-01 RX ORDER — HEPARIN 100 UNIT/ML
500 SYRINGE INTRAVENOUS
Status: CANCELLED | OUTPATIENT
Start: 2024-10-01

## 2024-10-01 RX ORDER — SODIUM CHLORIDE 0.9 % (FLUSH) 0.9 %
10 SYRINGE (ML) INJECTION
Status: CANCELLED | OUTPATIENT
Start: 2024-10-01

## 2024-10-01 RX ORDER — DIPHENHYDRAMINE HYDROCHLORIDE 50 MG/ML
50 INJECTION INTRAMUSCULAR; INTRAVENOUS ONCE AS NEEDED
Status: DISCONTINUED | OUTPATIENT
Start: 2024-10-01 | End: 2024-10-01 | Stop reason: HOSPADM

## 2024-10-01 RX ORDER — ATROPINE SULFATE 0.4 MG/ML
0.4 INJECTION, SOLUTION ENDOTRACHEAL; INTRAMEDULLARY; INTRAMUSCULAR; INTRAVENOUS; SUBCUTANEOUS ONCE AS NEEDED
Status: CANCELLED | OUTPATIENT
Start: 2024-10-01

## 2024-10-01 RX ORDER — PROCHLORPERAZINE EDISYLATE 5 MG/ML
5 INJECTION INTRAMUSCULAR; INTRAVENOUS ONCE AS NEEDED
Status: DISCONTINUED | OUTPATIENT
Start: 2024-10-01 | End: 2024-10-01 | Stop reason: HOSPADM

## 2024-10-01 RX ORDER — DIPHENHYDRAMINE HYDROCHLORIDE 50 MG/ML
50 INJECTION INTRAMUSCULAR; INTRAVENOUS ONCE AS NEEDED
Status: CANCELLED | OUTPATIENT
Start: 2024-10-01

## 2024-10-01 RX ORDER — HEPARIN 100 UNIT/ML
500 SYRINGE INTRAVENOUS
Status: CANCELLED | OUTPATIENT
Start: 2024-10-02

## 2024-10-01 RX ORDER — EPINEPHRINE 0.3 MG/.3ML
0.3 INJECTION SUBCUTANEOUS ONCE AS NEEDED
Status: CANCELLED | OUTPATIENT
Start: 2024-10-01

## 2024-10-01 RX ADMIN — FLUOROURACIL 3695 MG: 50 INJECTION, SOLUTION INTRAVENOUS at 03:10

## 2024-10-01 RX ADMIN — SODIUM CHLORIDE 240 MG: 9 INJECTION, SOLUTION INTRAVENOUS at 01:10

## 2024-10-01 RX ADMIN — SODIUM CHLORIDE: 9 INJECTION, SOLUTION INTRAVENOUS at 12:10

## 2024-10-01 RX ADMIN — DEXAMETHASONE SODIUM PHOSPHATE 0.25 MG: 4 INJECTION, SOLUTION INTRA-ARTICULAR; INTRALESIONAL; INTRAMUSCULAR; INTRAVENOUS; SOFT TISSUE at 12:10

## 2024-10-01 RX ADMIN — ATROPINE SULFATE 0.4 MG: 0.4 INJECTION, SOLUTION INTRAVENOUS at 01:10

## 2024-10-01 NOTE — PLAN OF CARE
"  Problem: Fatigue  Goal: Improved Activity Tolerance  Outcome: Progressing  Intervention: Promote Improved Energy  Flowsheets (Taken 10/1/2024 1246)  Fatigue Management:   activity schedule adjusted   activity assistance provided   fatigue-related activity identified   frequent rest breaks encouraged   paced activity encouraged  Sleep/Rest Enhancement:   awakenings minimized   consistent schedule promoted   family presence promoted   natural light exposure provided   noise level reduced   reading promoted   regular sleep/rest pattern promoted   relaxation techniques promoted  Activity Management:   Ambulated -L4   Up in chair - L3  Environmental Support:   calm environment promoted   caregiver consistency promoted   comfort object encouraged   distractions minimized   environmental consistency promoted   personal routine supported   rest periods encouraged  /67 (Patient Position: Sitting)   Pulse 78   Temp 97.7 °F (36.5 °C)   Resp 18   Ht 5' 7" (1.702 m)   Wt 58 kg (127 lb 12.1 oz)   SpO2 98%   BMI 20.01 kg/m² Pleasant, alert and oriented patient to Chemo Infusion per self with family for C32 Folfiri - VSS and RCW Port accessed with blood return observed, flushed with NS, dressing applied with Bio-Patch in place, and patient tolerated procedure well - patient tolerated treatment with no AVE's, port flushed with NS, blood return observed, 5fu 46 hour pump attached, all clamps open and checked, pump infusing, connection secured and patient discharged to home with no concerns - RTC on 10/3/24 at 1345 for pump d/c           "

## 2024-10-01 NOTE — PROGRESS NOTES
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 Bonilla  12/30/2022: Y-90 to R liver  1/27/2023: Y-90 to R liver  5/17/2023: Y-90 to R liver  7/11/2023: C1 FOLFIRI + Pema  7/26/2023: C2 FOLFIRI + Pema  8/8/2023: C3 FOLFIRI + Pema  8/22/23: C4 FOLFIRI + Pema  Restaging CT CAP 9/1/23: Good response to chemo.  9/7/23: C5 FOLFIRI + Pema  ......................................  Restaging CT CAP 10/12/23: Good response to chemo  10/16/23: C8 FOLFIRI + Pema  .......................................  Restaging CT CAP 12/8/23: Good response to chemo  12/11/23: C12 FOLFIRI + Pema  .......................................  Restaging CT CAP 2/5/24: Good response to chemo  2/8/24: C16 FOLFIRI + Pema    Restaging CT CAP 4/26/24: Good response to chemo  Restaging CT CAP 6/6/24: Good response to chemo  Restaging CT CAP 8/1/24: Stable disease      Interval History:   He presents today with his daughter prior to cycle 32 of FOLFIRI (off Avastin due to GIB). He is feeling pretty good today. He is eating well and has a good appetite. No diarrhea, nausea or vomiting. Cycle 31 was delayed due to a low Hb. He received a blood transfusion and began to improve. He has recovered well. No blood in his stool that he has noticed. Continues to tolerate treatment without complication. No other  concerns or complaints today.     ECOG status is 1. Presents with his daughter today.    Past Medical History:   Past Medical History:   Diagnosis Date    MARIA A (acute kidney injury) 2022    Hypertension     Metastatic colon cancer to liver 2021     Past Surgical HIstory:   Past Surgical History:   Procedure Laterality Date    COLONOSCOPY N/A 2021    Procedure: COLONOSCOPY with possible stent;  Surgeon: EDILMA Bonilla MD;  Location: [facility] ENDO (2ND FLR);  Service: Colon and Rectal;  Laterality: N/A;    COLONOSCOPY N/A 11/3/2023    Procedure: COLONOSCOPY;  Surgeon: EDILMA Bonilla MD;  Location: [facility] ENDO (4TH FLR);  Service: Endoscopy;  Laterality: N/A;  prep instructions sent to pt via portal  From: EDILMA Bonilla MD  Procedure: Colonoscopy  Diagnosis: Surveillance colonoscopy - Hx of colon cancer  Procedure Timin-12 weeks  Provider: Myself  Location: [address]  Additional Scheduling Information: No scheduling con    COLONOSCOPY N/A 7/10/2024    Procedure: COLONOSCOPY;  Surgeon: Tam Pantoja MD;  Location: [facility] ENDO (2ND FLR);  Service: Endoscopy;  Laterality: N/A;    DIAGNOSTIC LAPAROSCOPY N/A 2022    Procedure: LAPAROSCOPY, DIAGNOSTIC;  Surgeon: Adrian Rodriguez MD;  [facility];  Service: General;  Laterality: N/A;    INSERTION OF TUNNELED CENTRAL VENOUS CATHETER (CVC) WITH SUBCUTANEOUS PORT N/A 5/3/2021    Procedure: [id number];  Surgeon: Francisco Layton MD;  [facility];  Service: Vascular;  Laterality: N/A;    OMENTECTOMY N/A 10/20/2022    Procedure: OMENTECTOMY;  Surgeon: EDILMA Bonilla MD;  [facility];  Service: Colon and Rectal;  Laterality: N/A;    RIGHT HEMICOLECTOMY N/A 10/20/2022    Procedure: HEMICOLECTOMY, RIGHT, extended;  Surgeon: EDILMA Boinlla MD;  Location: [facility] OR 2ND FLR;  Service: Colon and Rectal;  Laterality: N/A;  Extended right hemicolectomy CONSENT IN AM     Family History:   Family History   Problem  Relation Name Age of Onset    Cancer Brother         Social History:  reports that he has never smoked. He has never used smokeless tobacco. He reports that he does not currently use alcohol. He reports that he does not use drugs.    Allergies:  Review of patient's allergies indicates:  No Known Allergies    Medications:  Current Outpatient Medications   Medication Sig Dispense Refill    acetaminophen (TYLENOL) 500 MG tablet Take 1 tablet (500 mg total) by mouth every 6 (six) hours as needed for Pain (alternate with ibuprofen).  0    amLODIPine (NORVASC) 10 MG tablet Take 1 tablet (10 mg total) by mouth once daily. 90 tablet 3    LIDOcaine-prilocaine (EMLA) cream Apply topically as needed (port application). 30 g 3    ondansetron (ZOFRAN) 4 MG tablet Take 1 tablet (4 mg total) by mouth every 8 (eight) hours as needed for Nausea. 30 tablet 3    pantoprazole (PROTONIX) 40 MG tablet Take 1 tablet (40 mg total) by mouth 2 (two) times daily before meals. 180 tablet 3    tamsulosin (FLOMAX) 0.4 mg Cap Take 1 capsule (0.4 mg total) by mouth once daily. 30 capsule 5    traMADoL (ULTRAM) 50 mg tablet Take 1 tablet (50 mg total) by mouth every 6 (six) hours as needed for Pain. 30 tablet 0    oxyCODONE (ROXICODONE) 5 MG immediate release tablet Take 1 tablet (5 mg total) by mouth every 6 (six) hours as needed for Pain. (Patient not taking: Reported on 10/1/2024) 20 tablet 0     No current facility-administered medications for this visit.     Review of Systems   Constitutional:  Negative for activity change, appetite change, chills, diaphoresis, fatigue, fever and unexpected weight change.   HENT:  Negative for congestion, mouth sores, nosebleeds, postnasal drip, rhinorrhea, trouble swallowing and voice change.    Eyes:  Negative for pain and visual disturbance.   Respiratory:  Negative for cough, chest tightness, shortness of breath and wheezing.    Cardiovascular:  Negative for chest pain, palpitations and leg swelling.  "  Gastrointestinal:  Negative for abdominal distention, abdominal pain, blood in stool, constipation, diarrhea, nausea and vomiting.   Genitourinary:  Negative for difficulty urinating, dysuria, flank pain, frequency, hematuria and urgency.   Musculoskeletal:  Negative for arthralgias, back pain and myalgias.   Skin:  Negative for rash and wound.   Neurological:  Negative for dizziness, facial asymmetry, weakness, light-headedness, numbness and headaches.   Psychiatric/Behavioral:  Negative for agitation, behavioral problems, confusion, decreased concentration, dysphoric mood and sleep disturbance. The patient is not nervous/anxious.    All other systems reviewed and are negative.    ECOG Performance Status: 1     Objective:      Vitals:   Vitals:    10/01/24 1206   BP: 102/67   BP Location: Right arm   Patient Position: Sitting   Pulse: 78   Temp: 98.6 °F (37 °C)   TempSrc: Temporal   SpO2: (P) 98%   Weight: 58 kg (127 lb 12.1 oz)   Height: 5' 7" (1.702 m)           Physical Exam  Vitals reviewed.   Constitutional:       General: He is not in acute distress.     Appearance: Normal appearance. He is not ill-appearing, toxic-appearing or diaphoretic.   HENT:      Head: Normocephalic and atraumatic.      Right Ear: External ear normal.      Left Ear: External ear normal.      Nose: Nose normal.      Mouth/Throat:      Pharynx: Oropharynx is clear.   Eyes:      General: No scleral icterus.     Extraocular Movements: Extraocular movements intact.      Conjunctiva/sclera: Conjunctivae normal.      Pupils: Pupils are equal, round, and reactive to light.   Cardiovascular:      Rate and Rhythm: Normal rate and regular rhythm.      Pulses: Normal pulses.      Heart sounds: Normal heart sounds. No murmur heard.  Pulmonary:      Effort: Pulmonary effort is normal. No respiratory distress.      Breath sounds: Normal breath sounds. No wheezing.   Chest:      Comments: Port to RCW, no signs of infection.  Abdominal:      General: " Abdomen is flat. Bowel sounds are normal. There is no distension.      Palpations: Abdomen is soft. There is no mass.      Tenderness: There is no abdominal tenderness.      Comments: Vertical midline abdominal wound well healed   Musculoskeletal:         General: No swelling or deformity. Normal range of motion.      Right lower leg: No edema.      Left lower leg: No edema.   Skin:     Coloration: Skin is not jaundiced or pale.      Findings: No bruising, erythema or rash.   Neurological:      General: No focal deficit present.      Mental Status: He is alert and oriented to person, place, and time. Mental status is at baseline.      Cranial Nerves: No cranial nerve deficit.      Sensory: No sensory deficit.      Gait: Gait normal.   Psychiatric:         Mood and Affect: Mood normal.         Behavior: Behavior normal.         Thought Content: Thought content normal.         Judgment: Judgment normal.     Laboratory Data:  Lab Visit on 10/01/2024   Component Date Value Ref Range Status    CEA 10/01/2024 32.9 (H)  0.0 - 5.0 ng/mL Final    Comment: CEA Normal Range:  Non-Smokers: 0-3.0 ng/mL  Smokers:     0-5.0 ng/mL    The testing method is a chemiluminescent microparticle immunoassay   manufactured by [employer] and performed on the [employer]   or   [employer] system. Values obtained with different assay manufacturers   for   methods may be different and cannot be used interchangeably.      Sodium 10/01/2024 137  136 - 145 mmol/L Final    Potassium 10/01/2024 4.4  3.5 - 5.1 mmol/L Final    Chloride 10/01/2024 105  95 - 110 mmol/L Final    CO2 10/01/2024 25  23 - 29 mmol/L Final    Glucose 10/01/2024 89  70 - 110 mg/dL Final    BUN 10/01/2024 14  8 - 23 mg/dL Final    Creatinine 10/01/2024 0.9  0.5 - 1.4 mg/dL Final    Calcium 10/01/2024 9.1  8.7 - 10.5 mg/dL Final    Total Protein 10/01/2024 7.0  6.0 - 8.4 g/dL Final    Albumin 10/01/2024 3.0 (L)  3.5 - 5.2 g/dL Final    Total Bilirubin 10/01/2024 1.0  0.1  - 1.0 mg/dL Final    Comment: For infants and newborns, interpretation of results should be based  on gestational age, weight and in agreement with clinical  observations.    Premature Infant recommended reference ranges:  Up to 24 hours.............<8.0 mg/dL  Up to 48 hours............<12.0 mg/dL  3-5 days..................<15.0 mg/dL  6-29 days.................<15.0 mg/dL      Alkaline Phosphatase 10/01/2024 193 (H)  55 - 135 U/L Final    AST 10/01/2024 31  10 - 40 U/L Final    ALT 10/01/2024 17  10 - 44 U/L Final    eGFR 10/01/2024 >60.0  >60 mL/min/1.73 m^2 Final    Anion Gap 10/01/2024 7 (L)  8 - 16 mmol/L Final    WBC 10/01/2024 3.74 (L)  3.90 - 12.70 K/uL Final    RBC 10/01/2024 3.98 (L)  4.60 - 6.20 M/uL Final    Hemoglobin 10/01/2024 9.2 (L)  14.0 - 18.0 g/dL Final    Hematocrit 10/01/2024 33.1 (L)  40.0 - 54.0 % Final    MCV 10/01/2024 83  82 - 98 fL Final    MCH 10/01/2024 23.1 (L)  27.0 - 31.0 pg Final    MCHC 10/01/2024 27.8 (L)  32.0 - 36.0 g/dL Final    RDW 10/01/2024 21.1 (H)  11.5 - 14.5 % Final    Platelets 10/01/2024 211  150 - 450 K/uL Final    MPV 10/01/2024 10.0  9.2 - 12.9 fL Final    Gran # (ANC) 10/01/2024 1.8  1.8 - 7.7 K/uL Final    Comment: The ANC is based on a white cell differential from an   automated cell counter. It has not been microscopically   reviewed for the presence of abnormal cells. Clinical   correlation is required.      Immature Grans (Abs) 10/01/2024 0.01  0.00 - 0.04 K/uL Final    Comment: Mild elevation in immature granulocytes is non specific and   can be seen in a variety of conditions including stress response,   acute inflammation, trauma and pregnancy. Correlation with other   laboratory and clinical findings is essential.     Lab Visit on 10/01/2024   Component Date Value Ref Range Status    Specimen UA 10/01/2024 Urine, Clean Catch   Final    Color, UA 10/01/2024 Yellow  Yellow, Straw, [place] Final    Appearance, UA 10/01/2024 Clear  Clear Final    pH, UA  10/01/2024 6.0  5.0 - 8.0 Final    Specific Gravity, UA 10/01/2024 1.020  1.005 - 1.030 Final    Protein, UA 10/01/2024 Trace (A)  Negative Final    Comment: Recommend a 24 hour urine protein or a urine   protein/creatinine ratio if globulin induced proteinuria is  clinically suspected.      Glucose, UA 10/01/2024 Negative  Negative Final    Ketones, UA 10/01/2024 Negative  Negative Final    Bilirubin (UA) 10/01/2024 Negative  Negative Final    Occult Blood UA 10/01/2024 Negative  Negative Final    Nitrite, UA 10/01/2024 Negative  Negative Final    Leukocytes, UA 10/01/2024 Negative  Negative Final     Assessment and Plan        1. Metastatic colon cancer to liver    2. Immunodeficiency secondary to neoplasm    3. Immunodeficiency due to chemotherapy    4. Lower GI bleed    5. Anemia associated with chemotherapy    6. MARIA A (acute kidney injury)    7. Pain    8. Weight decrease    9. Drug-induced constipation    10. Lower urinary tract symptoms (LUTS)    11. Hypertension, unspecified type      1-5.  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  Guardant 360 was negative for BRAF, VIRI, HER2 amplification.   Pretreatment CEA 57.  We had a long and sonia discussion with him about his diagnosis. Unfortunately, the disease is not curable but remains treatable and he has good performance status.   Restaging scans after 7 cycles of FOLFOX + Pema showed significant reduction in colonic mass and liver mass.   we switched to maintenance as of cycle 8 with 5FU + Pema  Restaging scans from March 2022 show stable disease.   MRI on 7/18/22 showing hepatic progression of disease in the right hepatic lobe noted on the exam. CT CAP on 8/26 shows some mild progression in both liver metastases.    Discussed case at colorectal tumor board 8/31/22 and had a diagnostic lap performed by Dr. Rodriguez on 9/7 to assess for any occult peritoneal disease. Findings from the diagnostic lap were negative. Fluid from  around from around the primary mass was sent for cytology that was negative for malignancy.   Underwent primary tumor resection on 10/20/22 with Dr. Bonilla.    Meanwhile his liver mets had grown.  We discussed his case at [facility] conference with plans for short term Y-90 and then restarting chemotherapy prior to considering any surgical options to his liver metastases.  He underwent Y-90 delivery on 12/30/22. Tolerated well.   CT CAP on 1/23/23 reviewed at [facility] conference with good response to Y-90, no new lesions.  Underwent second Y-90 on 1/27/23. Can consider R hepatectomy pending follow-up imaging after Y-90.     Received cycle 42 of FOLFOX (omitted oxaliplatin again starting cycle 41 due to neuropathy) on 2/9/23.  Because of 5-FU shortage nationally, we have been holding chemotherapy since mid-February 2023.  If he needs to restart chemotherapy (I.e. no plans for surgical resection), will place him on capecitabine maintenance for duration of 5-FU shortage.     Discussed at colorectal liver mets tumor board 3/16/23.  Plan for repeat Y-90 and then consideration of surgical resection and he will meet with liver transplant team as well.  Underwent Y-90 delivery 5/17/23 with IR.     Has been on maintenance Xeloda since late April 2023.    Met with liver transplant team but ultimately opted not to proceed with transplant as he was concerned about how he would tolerate a major surgery with the life changes that would come after transplant as well. They closed out his case.    He met with Dr. Rodriguez to discuss whether surgical resection is indicated for his liver mets.  He recommended repeat MRI.  Repeat MRI unfortunately showed disease progression while on Xeloda maintenance.  Similarly his CEA garrett precipitously, all in keeping with worsening disease.    We recommended we restart IV chemotherapy with FOLFIRI + Avastin (never had irinotecan).    Because of hyperbilirubinemia he received cycle 1 with just 5-FU + Avastin  on 7/11/23. Tolerated this well. Bilirubin has improved.  Received irinotecan starting with cycle 2.  Repeat CT CAP after cycle 4 shows good response to therapy.   Excellent tolerance. [facility] tumor board agrees with continuation of chemotherapy.   Repeat CT CAP after cycle 7 shows stable disease, no evidence of new or worsening disease.   Repeat CT CAP after cycle 11 shows stable disease.   Repeat CT CAP after cycle 15 shows improved disease.  Repeat CT CAP after cycle 21 shows stable disease.   Repeat CT CAP after cycle 24 shows stable disease.    Hospitalized after cycle 26 and 27 (without Avastin) for hematochezia.  Colonoscopy with likely diverticular bleed.  Required 2 units of blood in last month.  Will continue to hold bevacizumab indefinitely.  Discussed that we don't have a very good way preventing this recurrence and chemo with its associated thrombocytopenia may make this more likely to occur again.  He still wishes to proceed, which is my recommendation as well.    Repeat CT CAP after cycle 28 shows stable disease and recc to continue with chemotherapy. He is s/p 31 cycles.     Doing better today.   -Treatment held 4 weeks ago and blood transfusion given for Hgb 6.5. He recovered well and was able to get chemotherapy during his previous visit. Returns today to continue  - I have reviewed the CBC and CMP, adequate for treatment. CEA has increased this visit, concerning for progression of disease. However, he is scheduled for a CT CAP in 2 weeks. Will proceed today.   - Hgb improved and clinically feeling better.   - Proceed with cycle 32 of FOLFIRI.      Repeat imaging after this cycle  - RTC in 2 weeks and consideration for next cycle with lab work, CT CAP and treatment discussion    Tempus xT: APC, CKS1B cng, ERCC3 cnl, PIK3CA; KENIA, TMB 12.1.  Tempus xF: APC, KRAS Q61H, PIK3CA, TP53; KENIA    6. MARIA A  -- Resolved   -- Monitor. Encouraged continued hydration particularly with working outside.     7-10.  Neoplasm related pain, weight loss, constipation, malnutrition  -- Pain very well controlled. Has oxycodone 5mg to use PRN but not requiring now.  -- Following with nutrition. Encouraged increased protein. Monitor.  -- Continue Miralax daily for constipation. Doing well with this.     11, Urinary Hesitancy.  -- Continue Flomax.   -- Reported improvement since starting medication.     12. Hypertension   -- BP well controlled today. Otherwise, feels well.   -- Following with PCP.   -- encouraged him and his daughter to monitor BP and HR closely at home.     Pte and family members displayed understanding of the above encounter and treatment plan. All thoughtful questions were answered to their satisfaction. Pte was advised to notify the care team or proceed to the ER if signs and symptoms worsen.     Visit today included increased complexity associated with the care of the episodic problem chemotherapy  addressed and managing the longitudinal care of the patient due to the serious and/or complex managed problem(s) GI malignancies/cancer      FAN Meier, PA-C  [facility]  Dept of Hematology/Oncology  PA-C to GI Oncology team         Route Chart for Scheduling    Med Onc Chart Routing      Follow up with physician . Keep appointments as scheduled.   Follow up with RACHEL    Infusion scheduling note    Injection scheduling note    Labs CBC, CMP, urinalysis and CEA   Scheduling:  Preferred lab:  Lab interval:     Imaging CT chest abdomen pelvis   Scheduled in 2 weeks. Thank you   Pharmacy appointment    Other referrals

## 2024-10-03 ENCOUNTER — INFUSION (OUTPATIENT)
Dept: INFUSION THERAPY | Facility: HOSPITAL | Age: 74
End: 2024-10-03
Payer: MEDICARE

## 2024-10-03 VITALS
HEART RATE: 85 BPM | WEIGHT: 127.88 LBS | DIASTOLIC BLOOD PRESSURE: 59 MMHG | SYSTOLIC BLOOD PRESSURE: 94 MMHG | HEIGHT: 67 IN | RESPIRATION RATE: 18 BRPM | BODY MASS INDEX: 20.07 KG/M2 | TEMPERATURE: 98 F

## 2024-10-03 DIAGNOSIS — C78.7 METASTATIC COLON CANCER TO LIVER: Primary | ICD-10-CM

## 2024-10-03 DIAGNOSIS — C18.9 METASTATIC COLON CANCER TO LIVER: Primary | ICD-10-CM

## 2024-10-03 PROCEDURE — 25000003 PHARM REV CODE 250: Performed by: PHYSICIAN ASSISTANT

## 2024-10-03 PROCEDURE — 63600175 PHARM REV CODE 636 W HCPCS: Performed by: PHYSICIAN ASSISTANT

## 2024-10-03 PROCEDURE — A4216 STERILE WATER/SALINE, 10 ML: HCPCS | Performed by: PHYSICIAN ASSISTANT

## 2024-10-03 RX ORDER — HEPARIN 100 UNIT/ML
500 SYRINGE INTRAVENOUS
Status: DISCONTINUED | OUTPATIENT
Start: 2024-10-03 | End: 2024-10-03 | Stop reason: HOSPADM

## 2024-10-03 RX ORDER — SODIUM CHLORIDE 0.9 % (FLUSH) 0.9 %
10 SYRINGE (ML) INJECTION
Status: DISCONTINUED | OUTPATIENT
Start: 2024-10-03 | End: 2024-10-03 | Stop reason: HOSPADM

## 2024-10-03 RX ADMIN — Medication 10 ML: at 02:10

## 2024-10-03 RX ADMIN — HEPARIN 500 UNITS: 100 SYRINGE at 02:10

## 2024-10-11 ENCOUNTER — TELEPHONE (OUTPATIENT)
Dept: HEMATOLOGY/ONCOLOGY | Facility: CLINIC | Age: 74
End: 2024-10-11
Payer: MEDICARE

## 2024-10-11 ENCOUNTER — HOSPITAL ENCOUNTER (OUTPATIENT)
Dept: RADIOLOGY | Facility: HOSPITAL | Age: 74
Discharge: HOME | End: 2024-10-11
Attending: PHYSICIAN ASSISTANT
Payer: MEDICARE

## 2024-10-11 DIAGNOSIS — C78.7 METASTATIC COLON CANCER TO LIVER: ICD-10-CM

## 2024-10-11 DIAGNOSIS — C18.9 METASTATIC COLON CANCER TO LIVER: ICD-10-CM

## 2024-10-11 PROCEDURE — 74177 CT ABD & PELVIS W/CONTRAST: CPT | Mod: TC

## 2024-10-11 PROCEDURE — A9698 NON-RAD CONTRAST MATERIALNOC: HCPCS | Performed by: PHYSICIAN ASSISTANT

## 2024-10-11 PROCEDURE — 71260 CT THORAX DX C+: CPT | Mod: 26,,, | Performed by: RADIOLOGY

## 2024-10-11 PROCEDURE — 74177 CT ABD & PELVIS W/CONTRAST: CPT | Mod: 26,,, | Performed by: RADIOLOGY

## 2024-10-11 PROCEDURE — 25500020 PHARM REV CODE 255: Performed by: PHYSICIAN ASSISTANT

## 2024-10-11 PROCEDURE — 71260 CT THORAX DX C+: CPT | Mod: TC

## 2024-10-11 RX ADMIN — BARIUM SULFATE 450 ML: 20 SUSPENSION ORAL at 12:10

## 2024-10-11 RX ADMIN — IOHEXOL 75 ML: 350 INJECTION, SOLUTION INTRAVENOUS at 01:10

## 2024-10-11 NOTE — TELEPHONE ENCOUNTER
Spoke with patient and daughter. Reviewed low WBC. No current fevers, chills or infectious complaints. Advised to monitor for these. They are able to redraw labs Wednesday prior to MD appointment. Labs scheduled. Verbalized understanding. No other questions.

## 2024-10-14 PROBLEM — K92.2 LOWER GI BLEED: Status: RESOLVED | Noted: 2024-06-25 | Resolved: 2024-10-14

## 2024-10-15 DIAGNOSIS — C78.7 METASTATIC COLON CANCER TO LIVER: Primary | ICD-10-CM

## 2024-10-15 DIAGNOSIS — C18.9 METASTATIC COLON CANCER TO LIVER: Primary | ICD-10-CM

## 2024-10-16 ENCOUNTER — OFFICE VISIT (OUTPATIENT)
Dept: HEMATOLOGY/ONCOLOGY | Facility: CLINIC | Age: 74
End: 2024-10-16
Payer: MEDICARE

## 2024-10-16 ENCOUNTER — LAB VISIT (OUTPATIENT)
Dept: LAB | Facility: HOSPITAL | Age: 74
End: 2024-10-16
Payer: MEDICARE

## 2024-10-16 VITALS
HEART RATE: 85 BPM | SYSTOLIC BLOOD PRESSURE: 92 MMHG | HEIGHT: 67 IN | OXYGEN SATURATION: 100 % | BODY MASS INDEX: 19.66 KG/M2 | DIASTOLIC BLOOD PRESSURE: 62 MMHG | TEMPERATURE: 99 F | RESPIRATION RATE: 18 BRPM | WEIGHT: 125.25 LBS

## 2024-10-16 DIAGNOSIS — D84.821 IMMUNODEFICIENCY DUE TO CHEMOTHERAPY: ICD-10-CM

## 2024-10-16 DIAGNOSIS — T45.1X5A CHEMOTHERAPY INDUCED NEUTROPENIA: ICD-10-CM

## 2024-10-16 DIAGNOSIS — C18.9 METASTATIC COLON CANCER TO LIVER: Primary | ICD-10-CM

## 2024-10-16 DIAGNOSIS — K59.03 DRUG-INDUCED CONSTIPATION: ICD-10-CM

## 2024-10-16 DIAGNOSIS — C78.7 METASTATIC COLON CANCER TO LIVER: ICD-10-CM

## 2024-10-16 DIAGNOSIS — D84.81 IMMUNODEFICIENCY SECONDARY TO NEOPLASM: ICD-10-CM

## 2024-10-16 DIAGNOSIS — T45.1X5A ANEMIA ASSOCIATED WITH CHEMOTHERAPY: ICD-10-CM

## 2024-10-16 DIAGNOSIS — R39.9 LOWER URINARY TRACT SYMPTOMS (LUTS): ICD-10-CM

## 2024-10-16 DIAGNOSIS — C78.7 METASTATIC COLON CANCER TO LIVER: Primary | ICD-10-CM

## 2024-10-16 DIAGNOSIS — D64.81 ANEMIA ASSOCIATED WITH CHEMOTHERAPY: ICD-10-CM

## 2024-10-16 DIAGNOSIS — Z79.899 IMMUNODEFICIENCY DUE TO CHEMOTHERAPY: ICD-10-CM

## 2024-10-16 DIAGNOSIS — C18.9 METASTATIC COLON CANCER TO LIVER: ICD-10-CM

## 2024-10-16 DIAGNOSIS — T45.1X5A IMMUNODEFICIENCY DUE TO CHEMOTHERAPY: ICD-10-CM

## 2024-10-16 DIAGNOSIS — E43 SEVERE MALNUTRITION: ICD-10-CM

## 2024-10-16 DIAGNOSIS — D49.9 IMMUNODEFICIENCY SECONDARY TO NEOPLASM: ICD-10-CM

## 2024-10-16 DIAGNOSIS — D70.1 CHEMOTHERAPY INDUCED NEUTROPENIA: ICD-10-CM

## 2024-10-16 DIAGNOSIS — I10 HYPERTENSION, UNSPECIFIED TYPE: ICD-10-CM

## 2024-10-16 DIAGNOSIS — K92.2 LOWER GI BLEED: ICD-10-CM

## 2024-10-16 DIAGNOSIS — R52 PAIN: ICD-10-CM

## 2024-10-16 LAB
ERYTHROCYTE [DISTWIDTH] IN BLOOD BY AUTOMATED COUNT: 21.2 % (ref 11.5–14.5)
HCT VFR BLD AUTO: 32.1 % (ref 40–54)
HGB BLD-MCNC: 9.2 G/DL (ref 14–18)
IMM GRANULOCYTES # BLD AUTO: 0 K/UL (ref 0–0.04)
MCH RBC QN AUTO: 23.5 PG (ref 27–31)
MCHC RBC AUTO-ENTMCNC: 28.7 G/DL (ref 32–36)
MCV RBC AUTO: 82 FL (ref 82–98)
NEUTROPHILS # BLD AUTO: 0 K/UL (ref 1.8–7.7)
PLATELET # BLD AUTO: 238 K/UL (ref 150–450)
PMV BLD AUTO: 9.4 FL (ref 9.2–12.9)
RBC # BLD AUTO: 3.92 M/UL (ref 4.6–6.2)
WBC # BLD AUTO: 1.01 K/UL (ref 3.9–12.7)

## 2024-10-16 PROCEDURE — 3074F SYST BP LT 130 MM HG: CPT | Mod: CPTII,S$GLB,, | Performed by: INTERNAL MEDICINE

## 2024-10-16 PROCEDURE — 1101F PT FALLS ASSESS-DOCD LE1/YR: CPT | Mod: CPTII,S$GLB,, | Performed by: INTERNAL MEDICINE

## 2024-10-16 PROCEDURE — 1126F AMNT PAIN NOTED NONE PRSNT: CPT | Mod: CPTII,S$GLB,, | Performed by: INTERNAL MEDICINE

## 2024-10-16 PROCEDURE — 36415 COLL VENOUS BLD VENIPUNCTURE: CPT | Performed by: REGISTERED NURSE

## 2024-10-16 PROCEDURE — 3078F DIAST BP <80 MM HG: CPT | Mod: CPTII,S$GLB,, | Performed by: INTERNAL MEDICINE

## 2024-10-16 PROCEDURE — 3288F FALL RISK ASSESSMENT DOCD: CPT | Mod: CPTII,S$GLB,, | Performed by: INTERNAL MEDICINE

## 2024-10-16 PROCEDURE — 99214 OFFICE O/P EST MOD 30 MIN: CPT | Mod: S$GLB,,, | Performed by: INTERNAL MEDICINE

## 2024-10-16 PROCEDURE — 85027 COMPLETE CBC AUTOMATED: CPT | Performed by: REGISTERED NURSE

## 2024-10-16 PROCEDURE — 3008F BODY MASS INDEX DOCD: CPT | Mod: CPTII,S$GLB,, | Performed by: INTERNAL MEDICINE

## 2024-10-16 PROCEDURE — 1159F MED LIST DOCD IN RCRD: CPT | Mod: CPTII,S$GLB,, | Performed by: INTERNAL MEDICINE

## 2024-10-16 PROCEDURE — 99999 PR PBB SHADOW E&M-EST. PATIENT-LVL III: CPT | Mod: PBBFAC,,, | Performed by: INTERNAL MEDICINE

## 2024-10-16 PROCEDURE — G2211 COMPLEX E/M VISIT ADD ON: HCPCS | Mod: S$GLB,,, | Performed by: INTERNAL MEDICINE

## 2024-10-16 RX ORDER — TAMSULOSIN HYDROCHLORIDE 0.4 MG/1
0.4 CAPSULE ORAL DAILY
Qty: 30 CAPSULE | Refills: 5 | Status: SHIPPED | OUTPATIENT
Start: 2024-10-16 | End: 2025-10-16

## 2024-10-16 NOTE — PROGRESS NOTES
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 Bonilla  12/30/2022: Y-90 to R liver  1/27/2023: Y-90 to R liver  5/17/2023: Y-90 to R liver  7/11/2023: C1 FOLFIRI + Pema  7/26/2023: C2 FOLFIRI + Pema  8/8/2023: C3 FOLFIRI + Pema  8/22/23: C4 FOLFIRI + Pema  Restaging CT CAP 9/1/23: Good response to chemo.  9/7/23: C5 FOLFIRI + Pema  ......................................  Restaging CT CAP 10/12/23: Good response to chemo  10/16/23: C8 FOLFIRI + Pema  .......................................  Restaging CT CAP 12/8/23: Good response to chemo  12/11/23: C12 FOLFIRI + Pema  .......................................  Restaging CT CAP 2/5/24: Good response to chemo  2/8/24: C16 FOLFIRI + Pema    Restaging CT CAP 4/26/24: Good response to chemo  Restaging CT CAP 6/6/24: Good response to chemo  Restaging CT CAP 8/1/24: Stable disease  Restaging CT CAP 10/11/24: Progression of R liver lobe mass.  Plan for Y-90 to this.      Interval History:   He presents today with his daughter prior to cycle 33 of FOLFIRI (off Avastin due to GIB). He feels well.  No fevers or chills.  He denies pain or nausea.  No cough or pain with urination.  He is breathing well, occasional lightheadedness but no falls.  No further bleeding in his stool.    ECOG status is 1. Presents with his daughter  today.    Past Medical History:   Past Medical History:   Diagnosis Date    MARIA A (acute kidney injury) 2022    Hypertension     Metastatic colon cancer to liver 2021     Past Surgical HIstory:   Past Surgical History:   Procedure Laterality Date    COLONOSCOPY N/A 2021    Procedure: COLONOSCOPY with possible stent;  Surgeon: EDILMA Bonilla MD;  Location: [facility] ENDO (2ND FLR);  Service: Colon and Rectal;  Laterality: N/A;    COLONOSCOPY N/A 11/3/2023    Procedure: COLONOSCOPY;  Surgeon: EDILMA Bonilla MD;  Location: [facility] ENDO (4TH FLR);  Service: Endoscopy;  Laterality: N/A;  prep instructions sent to pt via portal  From: EDILMA Bonilla MD  Procedure: Colonoscopy  Diagnosis: Surveillance colonoscopy - Hx of colon cancer  Procedure Timin-12 weeks  Provider: Myself  Location: [facility] 4Endo  Additional Scheduling Information: No scheduling con    COLONOSCOPY N/A 7/10/2024    Procedure: COLONOSCOPY;  Surgeon: Tam Pantoja MD;  Location: [facility] (2ND FLR);  Service: Endoscopy;  Laterality: N/A;    DIAGNOSTIC LAPAROSCOPY N/A 2022    Procedure: LAPAROSCOPY, DIAGNOSTIC;  Surgeon: Adrian Rodriguez MD;  Location: [facility] OR 2ND FLR;  Service: General;  Laterality: N/A;    INSERTION OF TUNNELED CENTRAL VENOUS CATHETER (CVC) WITH SUBCUTANEOUS PORT N/A 5/3/2021    Procedure: GVHDLIEAC-CTDB-H-CATH;  Surgeon: Francisco Layton MD;  [facility];  Service: Vascular;  Laterality: N/A;    OMENTECTOMY N/A 10/20/2022    Procedure: OMENTECTOMY;  Surgeon: EDILMA Bonilla MD;  [facility];  Service: Colon and Rectal;  Laterality: N/A;    RIGHT HEMICOLECTOMY N/A 10/20/2022    Procedure: HEMICOLECTOMY, RIGHT, extended;  Surgeon: EDILMA Bonilla MD;  [facility];  Service: Colon and Rectal;  Laterality: N/A;  Extended right hemicolectomy CONSENT IN AM     Family History:   Family History   Problem Relation Name Age of Onset    Cancer Brother         Social History:  reports  that he has never smoked. He has never used smokeless tobacco. He reports that he does not currently use alcohol. He reports that he does not use drugs.    Allergies:  Review of patient's allergies indicates:  No Known Allergies    Medications:  Current Outpatient Medications   Medication Sig Dispense Refill    amLODIPine (NORVASC) 10 MG tablet Take 1 tablet (10 mg total) by mouth once daily. 90 tablet 3    LIDOcaine-prilocaine (EMLA) cream Apply topically as needed (port application). 30 g 3    ondansetron (ZOFRAN) 4 MG tablet Take 1 tablet (4 mg total) by mouth every 8 (eight) hours as needed for Nausea. 30 tablet 3    pantoprazole (PROTONIX) 40 MG tablet Take 1 tablet (40 mg total) by mouth 2 (two) times daily before meals. 180 tablet 3    acetaminophen (TYLENOL) 500 MG tablet Take 1 tablet (500 mg total) by mouth every 6 (six) hours as needed for Pain (alternate with ibuprofen). (Patient not taking: Reported on 10/16/2024)  0    oxyCODONE (ROXICODONE) 5 MG immediate release tablet Take 1 tablet (5 mg total) by mouth every 6 (six) hours as needed for Pain. (Patient not taking: Reported on 10/16/2024) 20 tablet 0    tamsulosin (FLOMAX) 0.4 mg Cap Take 1 capsule (0.4 mg total) by mouth once daily. 30 capsule 5    traMADoL (ULTRAM) 50 mg tablet Take 1 tablet (50 mg total) by mouth every 6 (six) hours as needed for Pain. (Patient not taking: Reported on 10/16/2024) 30 tablet 0     No current facility-administered medications for this visit.     Review of Systems   Constitutional:  Negative for activity change, appetite change, chills, diaphoresis, fatigue, fever and unexpected weight change.   HENT:  Negative for congestion, mouth sores, nosebleeds, postnasal drip, rhinorrhea, trouble swallowing and voice change.    Eyes:  Negative for pain and visual disturbance.   Respiratory:  Negative for cough, chest tightness, shortness of breath and wheezing.    Cardiovascular:  Negative for chest pain, palpitations and leg  "swelling.   Gastrointestinal:  Negative for abdominal distention, abdominal pain, blood in stool, constipation, diarrhea, nausea and vomiting.   Genitourinary:  Negative for difficulty urinating, dysuria, flank pain, frequency, hematuria and urgency.   Musculoskeletal:  Negative for arthralgias, back pain and myalgias.   Skin:  Negative for rash and wound.   Neurological:  Negative for dizziness, facial asymmetry, weakness, light-headedness, numbness and headaches.   Psychiatric/Behavioral:  Negative for agitation, behavioral problems, confusion, decreased concentration, dysphoric mood and sleep disturbance. The patient is not nervous/anxious.    All other systems reviewed and are negative.    ECOG Performance Status: 1     Objective:      Vitals:   Vitals:    10/16/24 0811   BP: 92/62   BP Location: Left arm   Patient Position: Sitting   Pulse: 85   Resp: 18   Temp: 98.6 °F (37 °C)   TempSrc: Oral   SpO2: 100%   Weight: 56.8 kg (125 lb 3.5 oz)   Height: 5' 7" (1.702 m)           Physical Exam  Vitals reviewed.   Constitutional:       General: He is not in acute distress.     Appearance: Normal appearance. He is not ill-appearing, toxic-appearing or diaphoretic.   HENT:      Head: Normocephalic and atraumatic.      Right Ear: External ear normal.      Left Ear: External ear normal.      Nose: Nose normal.      Mouth/Throat:      Pharynx: Oropharynx is clear.   Eyes:      General: No scleral icterus.     Extraocular Movements: Extraocular movements intact.      Conjunctiva/sclera: Conjunctivae normal.      Pupils: Pupils are equal, round, and reactive to light.   Cardiovascular:      Rate and Rhythm: Normal rate and regular rhythm.      Pulses: Normal pulses.      Heart sounds: Normal heart sounds. No murmur heard.  Pulmonary:      Effort: Pulmonary effort is normal. No respiratory distress.      Breath sounds: Normal breath sounds. No wheezing.   Chest:      Comments: Port to RCW, no signs of infection.  Abdominal: "      General: Abdomen is flat. Bowel sounds are normal. There is no distension.      Palpations: Abdomen is soft. There is no mass.      Tenderness: There is no abdominal tenderness.      Comments: Vertical midline abdominal wound well healed   Musculoskeletal:         General: No swelling or deformity. Normal range of motion.      Right lower leg: No edema.      Left lower leg: No edema.   Skin:     Coloration: Skin is not jaundiced or pale.      Findings: No bruising, erythema or rash.   Neurological:      General: No focal deficit present.      Mental Status: He is alert and oriented to person, place, and time. Mental status is at baseline.      Cranial Nerves: No cranial nerve deficit.      Sensory: No sensory deficit.      Gait: Gait normal.   Psychiatric:         Mood and Affect: Mood normal.         Behavior: Behavior normal.         Thought Content: Thought content normal.         Judgment: Judgment normal.     Laboratory Data:  Lab Visit on 10/16/2024   Component Date Value Ref Range Status    WBC 10/16/2024 1.01 (LL)  3.90 - 12.70 K/uL Final    Comment: WBC Previously reported results for this analyte were similarly   abnormal.  Call not needed.  Documented by TLA 10/16/2024 08:42      RBC 10/16/2024 3.92 (L)  4.60 - 6.20 M/uL Final    Hemoglobin 10/16/2024 9.2 (L)  14.0 - 18.0 g/dL Final    Hematocrit 10/16/2024 32.1 (L)  40.0 - 54.0 % Final    MCV 10/16/2024 82  82 - 98 fL Final    MCH 10/16/2024 23.5 (L)  27.0 - 31.0 pg Final    MCHC 10/16/2024 28.7 (L)  32.0 - 36.0 g/dL Final    RDW 10/16/2024 21.2 (H)  11.5 - 14.5 % Final    Platelets 10/16/2024 238  150 - 450 K/uL Final    MPV 10/16/2024 9.4  9.2 - 12.9 fL Final    Gran # (ANC) 10/16/2024 0.0 (L)  1.8 - 7.7 K/uL Final    Comment: The ANC is based on a white cell differential from an   automated cell counter. It has not been microscopically   reviewed for the presence of abnormal cells. Clinical   correlation is required.      Immature Grans (Abs)  10/16/2024 0.00  0.00 - 0.04 K/uL Final    Comment: Mild elevation in immature granulocytes is non specific and   can be seen in a variety of conditions including stress response,   acute inflammation, trauma and pregnancy. Correlation with other   laboratory and clinical findings is essential.     Lab Visit on 10/11/2024   Component Date Value Ref Range Status    CEA 10/11/2024 64.2 (H)  0.0 - 5.0 ng/mL Final    Comment: CEA Normal Range:  Non-Smokers: 0-3.0 ng/mL  Smokers:     0-5.0 ng/mL    The testing method is a chemiluminescent microparticle immunoassay   manufactured by [employer] and performed on the [employer]   or   [employer]oak system. Values obtained with different assay manufacturers   for   methods may be different and cannot be used interchangeably.      WBC 10/11/2024 1.60 (LL)  3.90 - 12.70 K/uL Final    RBC 10/11/2024 4.05 (L)  4.60 - 6.20 M/uL Final    Hemoglobin 10/11/2024 9.4 (L)  14.0 - 18.0 g/dL Final    Hematocrit 10/11/2024 33.1 (L)  40.0 - 54.0 % Final    MCV 10/11/2024 82  82 - 98 fL Final    MCH 10/11/2024 23.2 (L)  27.0 - 31.0 pg Final    MCHC 10/11/2024 28.4 (L)  32.0 - 36.0 g/dL Final    RDW 10/11/2024 20.6 (H)  11.5 - 14.5 % Final    Platelets 10/11/2024 150  150 - 450 K/uL Final    MPV 10/11/2024 10.2  9.2 - 12.9 fL Final    Immature Grans (Abs) 10/11/2024 CANCELED  0.00 - 0.04 K/uL Final    Comment: Mild elevation in immature granulocytes is non specific and   can be seen in a variety of conditions including stress response,   acute inflammation, trauma and pregnancy. Correlation with other   laboratory and clinical findings is essential.    Result canceled by the ancillary.      WBC 10/11/2024 1.60 (LL)  3.90 - 12.70 K/uL Final    Comment: wbc   critical result(s) called and verbal readback obtained from   adrian schwarz ma by [email or web address] 10/11/2024 12:33      RBC 10/11/2024 4.05 (L)  4.60 - 6.20 M/uL Final    Hemoglobin 10/11/2024 9.4 (L)  14.0 - 18.0 g/dL Final    Hematocrit  10/11/2024 33.1 (L)  40.0 - 54.0 % Final    MCV 10/11/2024 82  82 - 98 fL Final    MCH 10/11/2024 23.2 (L)  27.0 - 31.0 pg Final    MCHC 10/11/2024 28.4 (L)  32.0 - 36.0 g/dL Final    RDW 10/11/2024 20.6 (H)  11.5 - 14.5 % Final    Platelets 10/11/2024 150  150 - 450 K/uL Final    MPV 10/11/2024 10.2  9.2 - 12.9 fL Final    Immature Granulocytes 10/11/2024 CANCELED  0.0 - 0.5 % Final    Result canceled by the ancillary.    Immature Grans (Abs) 10/11/2024 CANCELED  0.00 - 0.04 K/uL Final    Comment: Mild elevation in immature granulocytes is non specific and   can be seen in a variety of conditions including stress response,   acute inflammation, trauma and pregnancy. Correlation with other   laboratory and clinical findings is essential.    Result canceled by the ancillary.      nRBC 10/11/2024 0  0 /100 WBC Final    Gran % 10/11/2024 28.0 (L)  38.0 - 73.0 % Final    Lymph % 10/11/2024 65.0 (H)  18.0 - 48.0 % Final    Mono % 10/11/2024 3.0 (L)  4.0 - 15.0 % Final    Eosinophil % 10/11/2024 2.0  0.0 - 8.0 % Final    Basophil % 10/11/2024 2.0 (H)  0.0 - 1.9 % Final    Platelet Estimate 10/11/2024 Appears normal   Final    Aniso 10/11/2024 Slight   Final    Poik 10/11/2024 Slight   Final    Hypo 10/11/2024 Occasional   Final    Tear Drop Cells 10/11/2024 Occasional   Final    Dohle Bodies 10/11/2024 Present   Final    Toxic Granulation 10/11/2024 Present   Final    Fragmented Cells 10/11/2024 Occasional   Final    Differential Method 10/11/2024 Manual   Final    Sodium 10/11/2024 140  136 - 145 mmol/L Final    Potassium 10/11/2024 4.4  3.5 - 5.1 mmol/L Final    Chloride 10/11/2024 108  95 - 110 mmol/L Final    CO2 10/11/2024 23  23 - 29 mmol/L Final    Glucose 10/11/2024 98  70 - 110 mg/dL Final    BUN 10/11/2024 9  8 - 23 mg/dL Final    Creatinine 10/11/2024 0.8  0.5 - 1.4 mg/dL Final    Calcium 10/11/2024 9.2  8.7 - 10.5 mg/dL Final    Total Protein 10/11/2024 7.2  6.0 - 8.4 g/dL Final    Albumin 10/11/2024 3.1 (L)   3.5 - 5.2 g/dL Final    Total Bilirubin 10/11/2024 0.9  0.1 - 1.0 mg/dL Final    Comment: For infants and newborns, interpretation of results should be based  on gestational age, weight and in agreement with clinical  observations.    Premature Infant recommended reference ranges:  Up to 24 hours.............<8.0 mg/dL  Up to 48 hours............<12.0 mg/dL  3-5 days..................<15.0 mg/dL  6-29 days.................<15.0 mg/dL      Alkaline Phosphatase 10/11/2024 197 (H)  55 - 135 U/L Final    AST 10/11/2024 26  10 - 40 U/L Final    ALT 10/11/2024 17  10 - 44 U/L Final    eGFR 10/11/2024 >60.0  >60 mL/min/1.73 m^2 Final    Anion Gap 10/11/2024 9  8 - 16 mmol/L Final   Lab Visit on 10/11/2024   Component Date Value Ref Range Status    Specimen UA 10/11/2024 Urine, Clean Catch   Final    Color, UA 10/11/2024 Yellow  Yellow, Straw, [place]a Final    Appearance, UA 10/11/2024 Clear  Clear Final    pH, UA 10/11/2024 7.0  5.0 - 8.0 Final    Specific Gravity, UA 10/11/2024 1.015  1.005 - 1.030 Final    Protein, UA 10/11/2024 Negative  Negative Final    Comment: Recommend a 24 hour urine protein or a urine   protein/creatinine ratio if globulin induced proteinuria is  clinically suspected.      Glucose, UA 10/11/2024 Negative  Negative Final    Ketones, UA 10/11/2024 Negative  Negative Final    Bilirubin (UA) 10/11/2024 Negative  Negative Final    Occult Blood UA 10/11/2024 Negative  Negative Final    Nitrite, UA 10/11/2024 Negative  Negative Final    Leukocytes, UA 10/11/2024 Negative  Negative Final     Assessment and Plan        1. Metastatic colon cancer to liver    2. Immunodeficiency secondary to neoplasm    3. Immunodeficiency due to chemotherapy    4. Lower GI bleed    5. Chemotherapy induced neutropenia    6. Anemia associated with chemotherapy    7. Pain    8. Drug-induced constipation    9. Severe malnutrition    10. Hypertension, unspecified type    11. Lower urinary tract symptoms (LUTS)      1-6.  Stage IV  CRC with liver metastasis. moderately differentiated, proficient MMR.  Guardant 360 was negative for BRAF, VIRI, HER2 amplification.   Pretreatment CEA 57.  We had a long and sonia discussion with him about his diagnosis. Unfortunately, the disease is not curable but remains treatable and he has good performance status.   Restaging scans after 7 cycles of FOLFOX + Pema showed significant reduction in colonic mass and liver mass.   we switched to maintenance as of cycle 8 with 5FU + Pema  Restaging scans from March 2022 show stable disease.   MRI on 7/18/22 showing hepatic progression of disease in the right hepatic lobe noted on the exam. CT CAP on 8/26 shows some mild progression in both liver metastases.    Discussed case at colorectal tumor board 8/31/22 and had a diagnostic lap performed by Dr. Rodriguez on 9/7 to assess for any occult peritoneal disease. Findings from the diagnostic lap were negative. Fluid from around from around the primary mass was sent for cytology that was negative for malignancy.   Underwent primary tumor resection on 10/20/22 with Dr. Bonilla.    Meanwhile his liver mets had grown.  We discussed his case at [facility] conference with plans for short term Y-90 and then restarting chemotherapy prior to considering any surgical options to his liver metastases.  He underwent Y-90 delivery on 12/30/22. Tolerated well.   CT CAP on 1/23/23 reviewed at CRC conference with good response to Y-90, no new lesions.  Underwent second Y-90 on 1/27/23. Can consider R hepatectomy pending follow-up imaging after Y-90.     Received cycle 42 of FOLFOX (omitted oxaliplatin again starting cycle 41 due to neuropathy) on 2/9/23.  Because of 5-FU shortage nationally, we have been holding chemotherapy since mid-February 2023.  If he needs to restart chemotherapy (I.e. no plans for surgical resection), will place him on capecitabine maintenance for duration of 5-FU shortage.     Discussed at colorectal liver mets tumor  board 3/16/23.  Plan for repeat Y-90 and then consideration of surgical resection and he will meet with liver transplant team as well.  Underwent Y-90 delivery 5/17/23 with IR.     Has been on maintenance Xeloda since late April 2023.    Met with liver transplant team but ultimately opted not to proceed with transplant as he was concerned about how he would tolerate a major surgery with the life changes that would come after transplant as well. They closed out his case.    He met with Dr. Rodriguez to discuss whether surgical resection is indicated for his liver mets.  He recommended repeat MRI.  Repeat MRI unfortunately showed disease progression while on Xeloda maintenance.  Similarly his CEA garrett precipitously, all in keeping with worsening disease.    We recommended we restart IV chemotherapy with FOLFIRI + Avastin (never had irinotecan).    Because of hyperbilirubinemia he received cycle 1 with just 5-FU + Avastin on 7/11/23. Tolerated this well. Bilirubin has improved.  Received irinotecan starting with cycle 2.  Repeat CT CAP after cycle 4 shows good response to therapy.   Excellent tolerance. mCRC tumor board agrees with continuation of chemotherapy.   Repeat CT CAP after cycle 7 shows stable disease, no evidence of new or worsening disease.   Repeat CT CAP after cycle 11 shows stable disease.   Repeat CT CAP after cycle 15 shows improved disease.  Repeat CT CAP after cycle 21 shows stable disease.   Repeat CT CAP after cycle 24 shows stable disease.    Hospitalized after cycle 26 and 27 (without Avastin) for hematochezia.  Colonoscopy with likely diverticular bleed.  Required 2 units of blood in last month.  Will continue to hold bevacizumab indefinitely.  Discussed that we don't have a very good way preventing this recurrence and chemo with its associated thrombocytopenia may make this more likely to occur again.  He still wishes to proceed, which is my recommendation as well.    Repeat CT CAP after cycle 28  shows stable disease and recc to continue with chemotherapy.   Repeat CT CAP after cycle 32 shows progression of R liver lobe metastasis with rising CEA.  Will plan for Y-90 to growing lesion.  Discussed with Dr. Vidal who will help arrange.    Will continue FOLFIRI in the meantime.  However, his ANC is 0.0 today, so will have to hold chemo today.  No s/s of infection.  Given ER precautions for any signs of infection.  Add OBI Neulasta to cycle 33 in 2 weeks.    Tempus xT: APC, CKS1B cng, ERCC3 cnl, PIK3CA; KENIA, TMB 12.1.  Tempus xF: APC, KRAS Q61H, PIK3CA, TP53; KENIA    7-9. Neoplasm related pain, weight loss, constipation, malnutrition  -- Pain very well controlled. Has oxycodone 5mg to use PRN but not requiring now.  -- Following with nutrition. Encouraged increased protein. Monitor.  -- Continue Miralax daily for constipation. Doing well with this.     10. Hypertension   -- BP low normal today. Asymptomatic.   -- Following with PCP.   -- encouraged him and his daughter to monitor BP and HR closely at home.     11. Urinary Hesitancy.  -- Refilled Flomax.   -- Reported improvement since starting medication.       Pte and family members displayed understanding of the above encounter and treatment plan. All thoughtful questions were answered to their satisfaction. Pte was advised to notify the care team or proceed to the ER if signs and symptoms worsen.     Visit today included increased complexity associated with the care of the episodic problem chemotherapy addressed and managing the longitudinal care of the patient due to the serious and/or complex managed problem(s) GI malignancies/cancer    Bunny Schuster MD  Hematology/Oncology  [facility]             Route Chart for Scheduling    Med Onc Chart Routing      Follow up with physician 4 weeks. for FOLFIRI   Follow up with RACHEL 2 weeks. for FOLFIRI   Infusion scheduling note New or changed treatment   added OBI Neulasta to day 3    Injection scheduling note    Labs CBC, CMP and CEA   Scheduling:  Preferred lab:  Lab interval: every 2 weeks     Imaging    Pharmacy appointment    Other referrals

## 2024-10-29 ENCOUNTER — LAB VISIT (OUTPATIENT)
Dept: LAB | Facility: HOSPITAL | Age: 74
End: 2024-10-29
Payer: MEDICARE

## 2024-10-29 ENCOUNTER — INFUSION (OUTPATIENT)
Dept: INFUSION THERAPY | Facility: HOSPITAL | Age: 74
End: 2024-10-29
Payer: MEDICARE

## 2024-10-29 ENCOUNTER — OFFICE VISIT (OUTPATIENT)
Dept: HEMATOLOGY/ONCOLOGY | Facility: CLINIC | Age: 74
End: 2024-10-29
Payer: MEDICARE

## 2024-10-29 VITALS
SYSTOLIC BLOOD PRESSURE: 105 MMHG | WEIGHT: 123.88 LBS | HEIGHT: 67 IN | RESPIRATION RATE: 18 BRPM | HEART RATE: 80 BPM | BODY MASS INDEX: 19.44 KG/M2 | DIASTOLIC BLOOD PRESSURE: 56 MMHG | OXYGEN SATURATION: 98 % | TEMPERATURE: 98 F

## 2024-10-29 VITALS
OXYGEN SATURATION: 98 % | SYSTOLIC BLOOD PRESSURE: 95 MMHG | HEART RATE: 95 BPM | TEMPERATURE: 98 F | DIASTOLIC BLOOD PRESSURE: 59 MMHG | RESPIRATION RATE: 18 BRPM | WEIGHT: 123.88 LBS | BODY MASS INDEX: 19.44 KG/M2 | HEIGHT: 67 IN

## 2024-10-29 DIAGNOSIS — T45.1X5A IMMUNODEFICIENCY DUE TO CHEMOTHERAPY: ICD-10-CM

## 2024-10-29 DIAGNOSIS — C18.9 METASTATIC COLON CANCER TO LIVER: ICD-10-CM

## 2024-10-29 DIAGNOSIS — K92.2 LOWER GI BLEED: ICD-10-CM

## 2024-10-29 DIAGNOSIS — K59.03 DRUG-INDUCED CONSTIPATION: ICD-10-CM

## 2024-10-29 DIAGNOSIS — T45.1X5A CHEMOTHERAPY INDUCED NEUTROPENIA: ICD-10-CM

## 2024-10-29 DIAGNOSIS — C78.7 METASTATIC COLON CANCER TO LIVER: Primary | ICD-10-CM

## 2024-10-29 DIAGNOSIS — I10 HYPERTENSION, UNSPECIFIED TYPE: ICD-10-CM

## 2024-10-29 DIAGNOSIS — C18.9 METASTATIC COLON CANCER TO LIVER: Primary | ICD-10-CM

## 2024-10-29 DIAGNOSIS — D49.9 IMMUNODEFICIENCY SECONDARY TO NEOPLASM: ICD-10-CM

## 2024-10-29 DIAGNOSIS — C78.7 METASTATIC COLON CANCER TO LIVER: ICD-10-CM

## 2024-10-29 DIAGNOSIS — E43 SEVERE MALNUTRITION: ICD-10-CM

## 2024-10-29 DIAGNOSIS — D84.81 IMMUNODEFICIENCY SECONDARY TO NEOPLASM: ICD-10-CM

## 2024-10-29 DIAGNOSIS — Z79.899 IMMUNODEFICIENCY DUE TO CHEMOTHERAPY: ICD-10-CM

## 2024-10-29 DIAGNOSIS — R39.9 LOWER URINARY TRACT SYMPTOMS (LUTS): ICD-10-CM

## 2024-10-29 DIAGNOSIS — T45.1X5A ANEMIA ASSOCIATED WITH CHEMOTHERAPY: ICD-10-CM

## 2024-10-29 DIAGNOSIS — D64.81 ANEMIA ASSOCIATED WITH CHEMOTHERAPY: ICD-10-CM

## 2024-10-29 DIAGNOSIS — D70.1 CHEMOTHERAPY INDUCED NEUTROPENIA: ICD-10-CM

## 2024-10-29 DIAGNOSIS — R52 PAIN: ICD-10-CM

## 2024-10-29 DIAGNOSIS — D84.821 IMMUNODEFICIENCY DUE TO CHEMOTHERAPY: ICD-10-CM

## 2024-10-29 LAB
ALBUMIN SERPL BCP-MCNC: 2.5 G/DL (ref 3.5–5.2)
ALP SERPL-CCNC: 118 U/L (ref 40–150)
ALT SERPL W/O P-5'-P-CCNC: 11 U/L (ref 10–44)
ANION GAP SERPL CALC-SCNC: 9 MMOL/L (ref 8–16)
AST SERPL-CCNC: 44 U/L (ref 10–40)
BILIRUB SERPL-MCNC: 1.2 MG/DL (ref 0.1–1)
BUN SERPL-MCNC: 9 MG/DL (ref 8–23)
CALCIUM SERPL-MCNC: 9.1 MG/DL (ref 8.7–10.5)
CEA SERPL-MCNC: 59 NG/ML (ref 0–5)
CHLORIDE SERPL-SCNC: 102 MMOL/L (ref 95–110)
CO2 SERPL-SCNC: 24 MMOL/L (ref 23–29)
CREAT SERPL-MCNC: 0.8 MG/DL (ref 0.5–1.4)
ERYTHROCYTE [DISTWIDTH] IN BLOOD BY AUTOMATED COUNT: 21.6 % (ref 11.5–14.5)
EST. GFR  (NO RACE VARIABLE): >60 ML/MIN/1.73 M^2
GLUCOSE SERPL-MCNC: 91 MG/DL (ref 70–110)
HCT VFR BLD AUTO: 31.8 % (ref 40–54)
HGB BLD-MCNC: 9.2 G/DL (ref 14–18)
IMM GRANULOCYTES # BLD AUTO: 0.16 K/UL (ref 0–0.04)
MCH RBC QN AUTO: 23.8 PG (ref 27–31)
MCHC RBC AUTO-ENTMCNC: 28.9 G/DL (ref 32–36)
MCV RBC AUTO: 82 FL (ref 82–98)
NEUTROPHILS # BLD AUTO: 9.1 K/UL (ref 1.8–7.7)
PLATELET # BLD AUTO: 347 K/UL (ref 150–450)
PMV BLD AUTO: 9.7 FL (ref 9.2–12.9)
POTASSIUM SERPL-SCNC: 4 MMOL/L (ref 3.5–5.1)
PROT SERPL-MCNC: 7.2 G/DL (ref 6–8.4)
RBC # BLD AUTO: 3.87 M/UL (ref 4.6–6.2)
SODIUM SERPL-SCNC: 135 MMOL/L (ref 136–145)
WBC # BLD AUTO: 13.59 K/UL (ref 3.9–12.7)

## 2024-10-29 PROCEDURE — 3078F DIAST BP <80 MM HG: CPT | Mod: CPTII,S$GLB,, | Performed by: INTERNAL MEDICINE

## 2024-10-29 PROCEDURE — 1101F PT FALLS ASSESS-DOCD LE1/YR: CPT | Mod: CPTII,S$GLB,, | Performed by: INTERNAL MEDICINE

## 2024-10-29 PROCEDURE — 3074F SYST BP LT 130 MM HG: CPT | Mod: CPTII,S$GLB,, | Performed by: INTERNAL MEDICINE

## 2024-10-29 PROCEDURE — 1125F AMNT PAIN NOTED PAIN PRSNT: CPT | Mod: CPTII,S$GLB,, | Performed by: INTERNAL MEDICINE

## 2024-10-29 PROCEDURE — 3008F BODY MASS INDEX DOCD: CPT | Mod: CPTII,S$GLB,, | Performed by: INTERNAL MEDICINE

## 2024-10-29 PROCEDURE — 25000003 PHARM REV CODE 250: Performed by: INTERNAL MEDICINE

## 2024-10-29 PROCEDURE — 3288F FALL RISK ASSESSMENT DOCD: CPT | Mod: CPTII,S$GLB,, | Performed by: INTERNAL MEDICINE

## 2024-10-29 PROCEDURE — 36415 COLL VENOUS BLD VENIPUNCTURE: CPT | Performed by: REGISTERED NURSE

## 2024-10-29 PROCEDURE — 80053 COMPREHEN METABOLIC PANEL: CPT | Performed by: REGISTERED NURSE

## 2024-10-29 PROCEDURE — 96375 TX/PRO/DX INJ NEW DRUG ADDON: CPT

## 2024-10-29 PROCEDURE — 99215 OFFICE O/P EST HI 40 MIN: CPT | Mod: S$GLB,,, | Performed by: INTERNAL MEDICINE

## 2024-10-29 PROCEDURE — 82378 CARCINOEMBRYONIC ANTIGEN: CPT | Performed by: REGISTERED NURSE

## 2024-10-29 PROCEDURE — 96413 CHEMO IV INFUSION 1 HR: CPT

## 2024-10-29 PROCEDURE — 85027 COMPLETE CBC AUTOMATED: CPT | Performed by: REGISTERED NURSE

## 2024-10-29 PROCEDURE — 96416 CHEMO PROLONG INFUSE W/PUMP: CPT

## 2024-10-29 PROCEDURE — 63600175 PHARM REV CODE 636 W HCPCS: Performed by: INTERNAL MEDICINE

## 2024-10-29 PROCEDURE — G2211 COMPLEX E/M VISIT ADD ON: HCPCS | Mod: S$GLB,,, | Performed by: INTERNAL MEDICINE

## 2024-10-29 PROCEDURE — 99999 PR PBB SHADOW E&M-EST. PATIENT-LVL III: CPT | Mod: PBBFAC,,, | Performed by: INTERNAL MEDICINE

## 2024-10-29 PROCEDURE — 96367 TX/PROPH/DG ADDL SEQ IV INF: CPT

## 2024-10-29 RX ORDER — DIPHENHYDRAMINE HYDROCHLORIDE 50 MG/ML
50 INJECTION INTRAMUSCULAR; INTRAVENOUS ONCE AS NEEDED
Status: CANCELLED | OUTPATIENT
Start: 2024-10-29

## 2024-10-29 RX ORDER — EPINEPHRINE 0.3 MG/.3ML
0.3 INJECTION SUBCUTANEOUS ONCE AS NEEDED
Status: CANCELLED | OUTPATIENT
Start: 2024-10-29

## 2024-10-29 RX ORDER — HEPARIN 100 UNIT/ML
500 SYRINGE INTRAVENOUS
Status: CANCELLED | OUTPATIENT
Start: 2024-10-29

## 2024-10-29 RX ORDER — PROCHLORPERAZINE EDISYLATE 5 MG/ML
5 INJECTION INTRAMUSCULAR; INTRAVENOUS ONCE AS NEEDED
Status: DISCONTINUED | OUTPATIENT
Start: 2024-10-29 | End: 2024-10-29 | Stop reason: HOSPADM

## 2024-10-29 RX ORDER — HEPARIN 100 UNIT/ML
500 SYRINGE INTRAVENOUS
Status: CANCELLED | OUTPATIENT
Start: 2024-10-31

## 2024-10-29 RX ORDER — EPINEPHRINE 0.3 MG/.3ML
0.3 INJECTION SUBCUTANEOUS ONCE AS NEEDED
Status: DISCONTINUED | OUTPATIENT
Start: 2024-10-29 | End: 2024-10-29 | Stop reason: HOSPADM

## 2024-10-29 RX ORDER — HEPARIN 100 UNIT/ML
500 SYRINGE INTRAVENOUS
Status: DISCONTINUED | OUTPATIENT
Start: 2024-10-29 | End: 2024-10-29 | Stop reason: HOSPADM

## 2024-10-29 RX ORDER — DIPHENHYDRAMINE HYDROCHLORIDE 50 MG/ML
50 INJECTION INTRAMUSCULAR; INTRAVENOUS ONCE AS NEEDED
Status: DISCONTINUED | OUTPATIENT
Start: 2024-10-29 | End: 2024-10-29 | Stop reason: HOSPADM

## 2024-10-29 RX ORDER — ATROPINE SULFATE 0.4 MG/ML
0.4 INJECTION, SOLUTION ENDOTRACHEAL; INTRAMEDULLARY; INTRAMUSCULAR; INTRAVENOUS; SUBCUTANEOUS ONCE AS NEEDED
Status: CANCELLED | OUTPATIENT
Start: 2024-10-29

## 2024-10-29 RX ORDER — ATROPINE SULFATE 0.4 MG/ML
0.4 INJECTION, SOLUTION ENDOTRACHEAL; INTRAMEDULLARY; INTRAMUSCULAR; INTRAVENOUS; SUBCUTANEOUS ONCE AS NEEDED
Status: COMPLETED | OUTPATIENT
Start: 2024-10-29 | End: 2024-10-29

## 2024-10-29 RX ORDER — SODIUM CHLORIDE 0.9 % (FLUSH) 0.9 %
10 SYRINGE (ML) INJECTION
Status: CANCELLED | OUTPATIENT
Start: 2024-10-31

## 2024-10-29 RX ORDER — PROCHLORPERAZINE EDISYLATE 5 MG/ML
5 INJECTION INTRAMUSCULAR; INTRAVENOUS ONCE AS NEEDED
Status: CANCELLED
Start: 2024-10-29

## 2024-10-29 RX ORDER — SODIUM CHLORIDE 0.9 % (FLUSH) 0.9 %
10 SYRINGE (ML) INJECTION
Status: DISCONTINUED | OUTPATIENT
Start: 2024-10-29 | End: 2024-10-29 | Stop reason: HOSPADM

## 2024-10-29 RX ORDER — SODIUM CHLORIDE 0.9 % (FLUSH) 0.9 %
10 SYRINGE (ML) INJECTION
Status: CANCELLED | OUTPATIENT
Start: 2024-10-29

## 2024-10-29 RX ADMIN — ATROPINE SULFATE 0.4 MG: 0.4 INJECTION, SOLUTION INTRAVENOUS at 02:10

## 2024-10-29 RX ADMIN — FLUOROURACIL 3695 MG: 50 INJECTION, SOLUTION INTRAVENOUS at 03:10

## 2024-10-29 RX ADMIN — DEXAMETHASONE SODIUM PHOSPHATE 0.25 MG: 4 INJECTION, SOLUTION INTRA-ARTICULAR; INTRALESIONAL; INTRAMUSCULAR; INTRAVENOUS; SOFT TISSUE at 01:10

## 2024-10-29 RX ADMIN — IRINOTECAN HYDROCHLORIDE 240 MG: 20 INJECTION, SOLUTION INTRAVENOUS at 02:10

## 2024-10-31 ENCOUNTER — INFUSION (OUTPATIENT)
Dept: INFUSION THERAPY | Facility: HOSPITAL | Age: 74
End: 2024-10-31
Payer: MEDICARE

## 2024-10-31 VITALS
TEMPERATURE: 98 F | DIASTOLIC BLOOD PRESSURE: 62 MMHG | SYSTOLIC BLOOD PRESSURE: 104 MMHG | OXYGEN SATURATION: 98 % | HEART RATE: 94 BPM | RESPIRATION RATE: 18 BRPM

## 2024-10-31 DIAGNOSIS — C78.7 METASTATIC COLON CANCER TO LIVER: Primary | ICD-10-CM

## 2024-10-31 DIAGNOSIS — C18.9 METASTATIC COLON CANCER TO LIVER: Primary | ICD-10-CM

## 2024-10-31 PROCEDURE — A4216 STERILE WATER/SALINE, 10 ML: HCPCS | Performed by: INTERNAL MEDICINE

## 2024-10-31 PROCEDURE — 63600175 PHARM REV CODE 636 W HCPCS: Performed by: INTERNAL MEDICINE

## 2024-10-31 PROCEDURE — 25000003 PHARM REV CODE 250: Performed by: INTERNAL MEDICINE

## 2024-10-31 PROCEDURE — 96377 APPLICATON ON-BODY INJECTOR: CPT

## 2024-10-31 RX ORDER — SODIUM CHLORIDE 9 MG/ML
INJECTION, SOLUTION INTRAVENOUS CONTINUOUS
Status: CANCELLED | OUTPATIENT
Start: 2024-10-31

## 2024-10-31 RX ORDER — HEPARIN 100 UNIT/ML
500 SYRINGE INTRAVENOUS
Status: DISCONTINUED | OUTPATIENT
Start: 2024-10-31 | End: 2024-10-31 | Stop reason: HOSPADM

## 2024-10-31 RX ORDER — SODIUM CHLORIDE 0.9 % (FLUSH) 0.9 %
10 SYRINGE (ML) INJECTION
Status: DISCONTINUED | OUTPATIENT
Start: 2024-10-31 | End: 2024-10-31 | Stop reason: HOSPADM

## 2024-10-31 RX ADMIN — HEPARIN 500 UNITS: 100 SYRINGE at 02:10

## 2024-10-31 RX ADMIN — PEGFILGRASTIM 6 MG: KIT SUBCUTANEOUS at 02:10

## 2024-10-31 RX ADMIN — Medication 10 ML: at 02:10

## 2024-11-01 ENCOUNTER — PATIENT MESSAGE (OUTPATIENT)
Dept: INTERVENTIONAL RADIOLOGY/VASCULAR | Facility: HOSPITAL | Age: 74
End: 2024-11-01
Payer: MEDICARE

## 2024-11-04 ENCOUNTER — TELEPHONE (OUTPATIENT)
Dept: INTERVENTIONAL RADIOLOGY/VASCULAR | Facility: HOSPITAL | Age: 74
End: 2024-11-04
Payer: MEDICARE

## 2024-11-04 ENCOUNTER — LAB VISIT (OUTPATIENT)
Dept: LAB | Facility: HOSPITAL | Age: 74
End: 2024-11-04
Payer: MEDICARE

## 2024-11-04 DIAGNOSIS — C78.7 METASTATIC COLON CANCER TO LIVER: ICD-10-CM

## 2024-11-04 DIAGNOSIS — C18.9 METASTATIC COLON CANCER TO LIVER: ICD-10-CM

## 2024-11-04 LAB
ALBUMIN SERPL BCP-MCNC: 2.5 G/DL (ref 3.5–5.2)
ALP SERPL-CCNC: 148 U/L (ref 40–150)
ALT SERPL W/O P-5'-P-CCNC: 21 U/L (ref 10–44)
ANION GAP SERPL CALC-SCNC: 8 MMOL/L (ref 8–16)
ANISOCYTOSIS BLD QL SMEAR: ABNORMAL
AST SERPL-CCNC: 34 U/L (ref 10–40)
BASOPHILS NFR BLD: 2 % (ref 0–1.9)
BILIRUB DIRECT SERPL-MCNC: 0.6 MG/DL (ref 0.1–0.3)
BILIRUB SERPL-MCNC: 1.4 MG/DL (ref 0.1–1)
BUN SERPL-MCNC: 12 MG/DL (ref 8–23)
CALCIUM SERPL-MCNC: 9 MG/DL (ref 8.7–10.5)
CHLORIDE SERPL-SCNC: 105 MMOL/L (ref 95–110)
CO2 SERPL-SCNC: 25 MMOL/L (ref 23–29)
CREAT SERPL-MCNC: 0.9 MG/DL (ref 0.5–1.4)
DIFFERENTIAL METHOD BLD: ABNORMAL
DOHLE BOD BLD QL SMEAR: PRESENT
EOSINOPHIL NFR BLD: 3 % (ref 0–8)
ERYTHROCYTE [DISTWIDTH] IN BLOOD BY AUTOMATED COUNT: 21.4 % (ref 11.5–14.5)
EST. GFR  (NO RACE VARIABLE): >60 ML/MIN/1.73 M^2
GLUCOSE SERPL-MCNC: 92 MG/DL (ref 70–110)
HCT VFR BLD AUTO: 30 % (ref 40–54)
HGB BLD-MCNC: 8.9 G/DL (ref 14–18)
HYPOCHROMIA BLD QL SMEAR: ABNORMAL
IMM GRANULOCYTES # BLD AUTO: ABNORMAL K/UL (ref 0–0.04)
IMM GRANULOCYTES NFR BLD AUTO: ABNORMAL % (ref 0–0.5)
INR PPP: 1.1 (ref 0.8–1.2)
LYMPHOCYTES NFR BLD: 16 % (ref 18–48)
MCH RBC QN AUTO: 24.2 PG (ref 27–31)
MCHC RBC AUTO-ENTMCNC: 29.7 G/DL (ref 32–36)
MCV RBC AUTO: 82 FL (ref 82–98)
MONOCYTES NFR BLD: 2 % (ref 4–15)
NEUTROPHILS NFR BLD: 72 % (ref 38–73)
NEUTS BAND NFR BLD MANUAL: 5 %
NRBC BLD-RTO: 0 /100 WBC
OVALOCYTES BLD QL SMEAR: ABNORMAL
PLATELET # BLD AUTO: 248 K/UL (ref 150–450)
PMV BLD AUTO: 9.5 FL (ref 9.2–12.9)
POIKILOCYTOSIS BLD QL SMEAR: SLIGHT
POLYCHROMASIA BLD QL SMEAR: ABNORMAL
POTASSIUM SERPL-SCNC: 4 MMOL/L (ref 3.5–5.1)
PROT SERPL-MCNC: 7 G/DL (ref 6–8.4)
PROTHROMBIN TIME: 12.3 SEC (ref 9–12.5)
RBC # BLD AUTO: 3.68 M/UL (ref 4.6–6.2)
SCHISTOCYTES BLD QL SMEAR: ABNORMAL
SODIUM SERPL-SCNC: 138 MMOL/L (ref 136–145)
WBC # BLD AUTO: 10.14 K/UL (ref 3.9–12.7)

## 2024-11-04 PROCEDURE — 85007 BL SMEAR W/DIFF WBC COUNT: CPT

## 2024-11-04 PROCEDURE — 36415 COLL VENOUS BLD VENIPUNCTURE: CPT

## 2024-11-04 PROCEDURE — 85027 COMPLETE CBC AUTOMATED: CPT

## 2024-11-04 PROCEDURE — 85610 PROTHROMBIN TIME: CPT

## 2024-11-04 PROCEDURE — 80053 COMPREHEN METABOLIC PANEL: CPT

## 2024-11-04 PROCEDURE — 82248 BILIRUBIN DIRECT: CPT

## 2024-11-04 NOTE — NURSING
Pre-procedure call complete.  Spoke to patients daughter.    No food after midnight.  Patient may drink clear liquids (sports drinks, clear juices) until 2 hours before arrival time.  Clear liquids include only water, black coffee (no creamer), clear oral rehydration drinks, clear sports drinks and clear fruit juices.  Clear liquids do NOT include anything with pulp or food particles (chicken broth, ice cream, yogurt, jello, etc).  NO orange juice, pulpy juices, or apple cider.  If you can read newsprint through the liquid, it qualifies as clear.  IF UNSURE, drink water instead.      Patient to have NOTHING BY MOUTH 2 hours before arrival time.  Medication the morning of your procedure may be taken with a sip of water.    Aware will need someone to provide transport home and monitor pt 8 hours post procedure.  No driving for at least 24 hours after procedure.   Advised to have patient to take blood pressure medications with a sip of water morning of procedure.  Verbalized aware of which medications to take.  Do not take sleep medication (including OTC) the night before procedure.  Arrival time and location given.  Expected length of stay reviewed.  Covid screening completed.  Patients daughter verbalized understanding of all pre-procedure instructions.  Written instructions and directions sent to patient in Mychart/portal.

## 2024-11-05 ENCOUNTER — HOSPITAL ENCOUNTER (OUTPATIENT)
Dept: INTERVENTIONAL RADIOLOGY/VASCULAR | Facility: HOSPITAL | Age: 74
Discharge: HOME | End: 2024-11-05
Payer: MEDICARE

## 2024-11-05 ENCOUNTER — HOSPITAL ENCOUNTER (OUTPATIENT)
Dept: RADIOLOGY | Facility: HOSPITAL | Age: 74
Discharge: HOME | End: 2024-11-05
Payer: MEDICARE

## 2024-11-05 VITALS
HEIGHT: 67 IN | HEART RATE: 69 BPM | OXYGEN SATURATION: 100 % | BODY MASS INDEX: 19.3 KG/M2 | DIASTOLIC BLOOD PRESSURE: 70 MMHG | SYSTOLIC BLOOD PRESSURE: 113 MMHG | WEIGHT: 123 LBS | TEMPERATURE: 97 F | RESPIRATION RATE: 13 BRPM

## 2024-11-05 DIAGNOSIS — C18.9 METASTATIC COLON CANCER TO LIVER: ICD-10-CM

## 2024-11-05 DIAGNOSIS — C78.7 METASTATIC COLON CANCER TO LIVER: ICD-10-CM

## 2024-11-05 PROCEDURE — 77290 THER RAD SIMULAJ FIELD CPLX: CPT | Mod: TC | Performed by: RADIOLOGY

## 2024-11-05 PROCEDURE — C1760 CLOSURE DEV, VASC: HCPCS

## 2024-11-05 PROCEDURE — 78830 RP LOCLZJ TUM SPECT W/CT 1: CPT | Mod: TC

## 2024-11-05 PROCEDURE — 25500020 PHARM REV CODE 255: Performed by: RADIOLOGY

## 2024-11-05 PROCEDURE — 36248 INS CATH ABD/L-EXT ART ADDL: CPT | Performed by: RADIOLOGY

## 2024-11-05 PROCEDURE — 76377 3D RENDER W/INTRP POSTPROCES: CPT | Mod: TC,59 | Performed by: RADIOLOGY

## 2024-11-05 PROCEDURE — C1769 GUIDE WIRE: HCPCS

## 2024-11-05 PROCEDURE — 75887 VEIN X-RAY LIVER W/O HEMODYN: CPT | Mod: 26,,, | Performed by: RADIOLOGY

## 2024-11-05 PROCEDURE — C1894 INTRO/SHEATH, NON-LASER: HCPCS

## 2024-11-05 PROCEDURE — 36247 INS CATH ABD/L-EXT ART 3RD: CPT | Mod: 51,,, | Performed by: RADIOLOGY

## 2024-11-05 PROCEDURE — 78201 LIVER IMAGING STATIC ONLY: CPT | Mod: TC

## 2024-11-05 PROCEDURE — 76380 CAT SCAN FOLLOW-UP STUDY: CPT | Mod: TC | Performed by: RADIOLOGY

## 2024-11-05 PROCEDURE — C1887 CATHETER, GUIDING: HCPCS

## 2024-11-05 PROCEDURE — 36247 INS CATH ABD/L-EXT ART 3RD: CPT | Performed by: RADIOLOGY

## 2024-11-05 PROCEDURE — G0269 OCCLUSIVE DEVICE IN VEIN ART: HCPCS | Performed by: RADIOLOGY

## 2024-11-05 PROCEDURE — A9540 TC99M MAA: HCPCS

## 2024-11-05 PROCEDURE — 75774 ARTERY X-RAY EACH VESSEL: CPT | Mod: TC | Performed by: RADIOLOGY

## 2024-11-05 PROCEDURE — 75726 ARTERY X-RAYS ABDOMEN: CPT | Mod: 26,,, | Performed by: RADIOLOGY

## 2024-11-05 PROCEDURE — 76377 3D RENDER W/INTRP POSTPROCES: CPT | Mod: 26,59,, | Performed by: RADIOLOGY

## 2024-11-05 PROCEDURE — 77290 THER RAD SIMULAJ FIELD CPLX: CPT | Mod: 26,,, | Performed by: RADIOLOGY

## 2024-11-05 PROCEDURE — 76380 CAT SCAN FOLLOW-UP STUDY: CPT | Mod: 26,,, | Performed by: RADIOLOGY

## 2024-11-05 PROCEDURE — 76937 US GUIDE VASCULAR ACCESS: CPT | Mod: 26,,, | Performed by: RADIOLOGY

## 2024-11-05 PROCEDURE — 99153 MOD SED SAME PHYS/QHP EA: CPT | Performed by: RADIOLOGY

## 2024-11-05 PROCEDURE — 78830 RP LOCLZJ TUM SPECT W/CT 1: CPT | Mod: 26,,, | Performed by: NUCLEAR MEDICINE

## 2024-11-05 PROCEDURE — 99152 MOD SED SAME PHYS/QHP 5/>YRS: CPT | Performed by: RADIOLOGY

## 2024-11-05 PROCEDURE — 75774 ARTERY X-RAY EACH VESSEL: CPT | Mod: 26,,, | Performed by: RADIOLOGY

## 2024-11-05 PROCEDURE — 75726 ARTERY X-RAYS ABDOMEN: CPT | Mod: TC | Performed by: RADIOLOGY

## 2024-11-05 PROCEDURE — 36248 INS CATH ABD/L-EXT ART ADDL: CPT | Mod: ,,, | Performed by: RADIOLOGY

## 2024-11-05 PROCEDURE — 76937 US GUIDE VASCULAR ACCESS: CPT | Mod: TC | Performed by: RADIOLOGY

## 2024-11-05 PROCEDURE — 63600175 PHARM REV CODE 636 W HCPCS: Performed by: RADIOLOGY

## 2024-11-05 PROCEDURE — 75887 VEIN X-RAY LIVER W/O HEMODYN: CPT | Mod: TC | Performed by: RADIOLOGY

## 2024-11-05 PROCEDURE — 36247 INS CATH ABD/L-EXT ART 3RD: CPT | Mod: LT | Performed by: RADIOLOGY

## 2024-11-05 PROCEDURE — 78201 LIVER IMAGING STATIC ONLY: CPT | Mod: 26,59,, | Performed by: NUCLEAR MEDICINE

## 2024-11-05 RX ORDER — LIDOCAINE HYDROCHLORIDE 10 MG/ML
INJECTION, SOLUTION INFILTRATION; PERINEURAL
Status: COMPLETED | OUTPATIENT
Start: 2024-11-05 | End: 2024-11-05

## 2024-11-05 RX ORDER — FENTANYL CITRATE 50 UG/ML
INJECTION, SOLUTION INTRAMUSCULAR; INTRAVENOUS
Status: COMPLETED | OUTPATIENT
Start: 2024-11-05 | End: 2024-11-05

## 2024-11-05 RX ORDER — LIDOCAINE HYDROCHLORIDE 10 MG/ML
1 INJECTION, SOLUTION EPIDURAL; INFILTRATION; INTRACAUDAL; PERINEURAL ONCE AS NEEDED
Status: DISCONTINUED | OUTPATIENT
Start: 2024-11-05 | End: 2024-11-06 | Stop reason: HOSPADM

## 2024-11-05 RX ORDER — ONDANSETRON HYDROCHLORIDE 2 MG/ML
4 INJECTION, SOLUTION INTRAVENOUS EVERY 6 HOURS PRN
Status: DISCONTINUED | OUTPATIENT
Start: 2024-11-05 | End: 2024-11-06 | Stop reason: HOSPADM

## 2024-11-05 RX ORDER — MIDAZOLAM HYDROCHLORIDE 1 MG/ML
INJECTION, SOLUTION INTRAMUSCULAR; INTRAVENOUS
Status: COMPLETED | OUTPATIENT
Start: 2024-11-05 | End: 2024-11-05

## 2024-11-05 RX ADMIN — LIDOCAINE HYDROCHLORIDE 3 ML: 10 INJECTION, SOLUTION INFILTRATION; PERINEURAL at 12:11

## 2024-11-05 RX ADMIN — MIDAZOLAM HYDROCHLORIDE 1 MG: 2 INJECTION, SOLUTION INTRAMUSCULAR; INTRAVENOUS at 12:11

## 2024-11-05 RX ADMIN — KIT FOR THE PREPARATION OF TECHNETIUM TC 99M ALBUMIN AGGREGATED 5.3 MILLICURIE: 2.5 INJECTION, POWDER, FOR SOLUTION INTRAVENOUS at 01:11

## 2024-11-05 RX ADMIN — IOHEXOL 75 ML: 300 INJECTION, SOLUTION INTRAVENOUS at 02:11

## 2024-11-05 RX ADMIN — FENTANYL CITRATE 50 MCG: 50 INJECTION, SOLUTION INTRAMUSCULAR; INTRAVENOUS at 12:11

## 2024-11-05 NOTE — DISCHARGE SUMMARY
Radiology Discharge Summary      Hospital Course: No complications    Admit Date: 11/5/2024  Discharge Date: 11/05/2024     Instructions Given to Patient: Yes  Diet: Resume prior diet  Activity: activity as tolerated and no driving for today    Description of Condition on Discharge: Stable  Vital Signs (Most Recent): Temp: 98.2 °F (36.8 °C) (11/05/24 1038)  Pulse: 66 (11/05/24 1350)  Resp: (!) 21 (11/05/24 1350)  BP: 112/66 (11/05/24 1350)  SpO2: 100 % (11/05/24 1350)    Discharge Disposition: Home    Discharge Diagnosis: mCRC s/p Y90 mapping    Follow up: As scheduled    Raul Vidal M.D.  Interventional Radiology  Department of Radiology

## 2024-11-05 NOTE — PROCEDURES
Radiology Post-Procedure Note    Pre Op Diagnosis: mCRC    Post Op Diagnosis: Same    Procedure: Y90 mapping    Procedure performed by: Raul Vidal MD    Written Informed Consent Obtained: Yes  Specimen Removed: NO  Estimated Blood Loss: Minimal    Findings:   Via rt cfa, celiac, rlha, angel, rha and multiple branches selected and angiograms obtained. Tumor feeders identified. MAA to rha. Vascade to rt cfa. No complications. See dictation.    Patient tolerated procedure well.    Raul Vidal M.D.  Interventional Radiology  Department of Radiology

## 2024-11-05 NOTE — CARE UPDATE
Pt arrived to MPU 6 for recovery of a Y90 mapping.Pt to recover  until 15:55 pm then may sit up and discharge home. See vs and assessment in the computer.

## 2024-11-05 NOTE — DISCHARGE INSTRUCTIONS
Pre-Yttrium (Y90 mapping) instructions:    Dont drive for 3 days.  Avoid walking, bending, and taking stairs for 24 hours.  No heavy lifting (gallon of milk) or strenuous exercise for 10 days.  Keep your dressing clean and dry for 24 hours. Do not submerge insertion site in water (bath tub, swimming pool) until healed.  Be sure to follow any other instructions from your doctor.      Call your doctor if you have any of the following:    Fever of 100.4 (38C) or higher lasting for 24 to 48 hours  Bleeding, swelling, or a large lump at the insertion site  Sharp or increasing pain at the insertion site  Leg pain, numbness, or a cold leg or foot  Any other symptoms your provider instructed you to report based on your medical condition.          Please call with any questions or concerns.    Monday through Friday 8:00 am - 4:30 pm    Interventional Radiology   [phone] clinic    After Hours    Ask the  for the Radiology Resident on call  [phone]

## 2024-11-05 NOTE — H&P
Radiology History & Physical      SUBJECTIVE:     Chief Complaint: Liver lesion    History of Present Illness:  Javier Downs is a 74 y.o. male with a history of metastatic colorectal cancer who presents for presents for Y90 Mapping. Pt has had multiple other Y90 embolizations most recently 23.    Past Medical History:   Diagnosis Date    MARIA A (acute kidney injury) 2022    Hypertension     Metastatic colon cancer to liver 2021     Past Surgical History:   Procedure Laterality Date    COLONOSCOPY N/A 2021    Procedure: COLONOSCOPY with possible stent;  Surgeon: EDILMA Bonilla MD;  Location: [facility] ENDO (2ND FLR);  Service: Colon and Rectal;  Laterality: N/A;    COLONOSCOPY N/A 11/3/2023    Procedure: COLONOSCOPY;  Surgeon: EDILMA Bonilla MD;  Location: [facility] ENDO (4TH FLR);  Service: Endoscopy;  Laterality: N/A;  prep instructions sent to pt via portal  From: EDILMA Bonilla MD  Procedure: Colonoscopy  Diagnosis: Surveillance colonoscopy - Hx of colon cancer  Procedure Timin-12 weeks  Provider: Myself  Location: [address]  Additional Scheduling Information: No scheduling con    COLONOSCOPY N/A 7/10/2024    Procedure: COLONOSCOPY;  Surgeon: Tam Pantoja MD;  Location: [facility] ENDO (2ND FLR);  Service: Endoscopy;  Laterality: N/A;    DIAGNOSTIC LAPAROSCOPY N/A 2022    Procedure: LAPAROSCOPY, DIAGNOSTIC;  Surgeon: Adrian Rodriguez MD;  Location: [facility] OR 2ND FLR;  Service: General;  Laterality: N/A;    INSERTION OF TUNNELED CENTRAL VENOUS CATHETER (CVC) WITH SUBCUTANEOUS PORT N/A 5/3/2021    Procedure: [id number];  Surgeon: Francisco Layton MD;  Location: [facility] OR 2ND FLR;  Service: Vascular;  Laterality: N/A;    OMENTECTOMY N/A 10/20/2022    Procedure: OMENTECTOMY;  Surgeon: EDILMA Bonilla MD;  [facility];  Service: Colon and Rectal;  Laterality: N/A;    RIGHT HEMICOLECTOMY N/A 10/20/2022    Procedure: HEMICOLECTOMY, RIGHT, extended;  Surgeon: EDILMA Bonilla MD;   Location: [facility] OR [address] [place] [address];  Service: Colon and Rectal;  Laterality: N/A;  Extended right hemicolectomy CONSENT IN AM       Home Meds:   Prior to Admission medications    Medication Sig Start Date End Date Taking? Authorizing Provider   acetaminophen (TYLENOL) 500 MG tablet Take 1 tablet (500 mg total) by mouth every 6 (six) hours as needed for Pain (alternate with ibuprofen). 5/3/21   FRANKLIN Delarosa MD   amLODIPine (NORVASC) 10 MG tablet Take 1 tablet (10 mg total) by mouth once daily. 9/16/24   Natividad Maher CNS   LIDOcaine-prilocaine (EMLA) cream Apply topically as needed (port application). 4/15/24   Anali Hernandez, ODIN   ondansetron (ZOFRAN) 4 MG tablet Take 1 tablet (4 mg total) by mouth every 8 (eight) hours as needed for Nausea. 6/8/23   Thomas Monroe MD   oxyCODONE (ROXICODONE) 5 MG immediate release tablet Take 1 tablet (5 mg total) by mouth every 6 (six) hours as needed for Pain. 11/2/22   Elsy Kelly NP   pantoprazole (PROTONIX) 40 MG tablet Take 1 tablet (40 mg total) by mouth 2 (two) times daily before meals. 6/26/24 6/26/25  Max Grijalva MD   tamsulosin (FLOMAX) 0.4 mg Cap Take 1 capsule (0.4 mg total) by mouth once daily. 10/16/24 10/16/25  Bunny Schuster MD   traMADoL (ULTRAM) 50 mg tablet Take 1 tablet (50 mg total) by mouth every 6 (six) hours as needed for Pain. 9/16/24   Natividad Maher CNS     Anticoagulants/Antiplatelets:  reviewed    Allergies: Review of patient's allergies indicates:  No Known Allergies  Sedation History:  no adverse reactions    Review of Systems:   Hematological: no known coagulopathies  Respiratory: no shortness of breath  Cardiovascular: no chest pain  Gastrointestinal: no abdominal pain  Genito-Urinary: no dysuria  Musculoskeletal: negative  Neurological: no TIA or stroke symptoms         OBJECTIVE:     Vital Signs (Most Recent)  Temp: 98.2 °F (36.8 °C) (11/05/24 1038)  Pulse: 85 (11/05/24 1038)  Resp: 19 (11/05/24 1038)  BP: 105/66  (11/05/24 1038)  SpO2: 100 % (11/05/24 1038)    Physical Exam:  ASA: 3  Mallampati: 2    General: no acute distress  Mental Status: alert and oriented to person, place and time  HEENT: normocephalic, atraumatic  Chest: unlabored breathing  Heart: regular heart rate  Abdomen: nondistended  Extremity: moves all extremities    Laboratory  Lab Results   Component Value Date    INR 1.1 11/04/2024       Lab Results   Component Value Date    WBC 10.14 11/04/2024    HGB 8.9 (L) 11/04/2024    HCT 30.0 (L) 11/04/2024    MCV 82 11/04/2024     11/04/2024      Lab Results   Component Value Date    GLU 92 11/04/2024     11/04/2024    K 4.0 11/04/2024     11/04/2024    CO2 25 11/04/2024    BUN 12 11/04/2024    CREATININE 0.9 11/04/2024    CALCIUM 9.0 11/04/2024    MG 2.0 07/11/2024    ALT 21 11/04/2024    AST 34 11/04/2024    ALBUMIN 2.5 (L) 11/04/2024    BILITOT 1.4 (H) 11/04/2024    BILIDIR 0.6 (H) 11/04/2024       ASSESSMENT/PLAN:     Sedation Plan: up to moderate  Patient will undergo Y90 mapping.    Written consent was obtained from the patient. All questions answered.     Michael Rowland MD  Radiology PGY-2

## 2024-11-05 NOTE — SEDATION DOCUMENTATION
Procedure completed. Patient tolerated well; VSS. Site CDI. Patient to be transported to nuclear medicine for scan and then MPU for two hour recovery; report to be given at the bedside.

## 2024-11-05 NOTE — CARE UPDATE
Pt fully recovered from procedure , right groin CDI without s/s of bleeding or hematoma. Pt and daughter state full understanding of discharge instructions. Pt dressed and waiting on transport to [facility] to [facility].

## 2024-11-05 NOTE — SEDATION DOCUMENTATION
Pt arrived to  for pre y90. Pt oriented to unit and staff, Pt safely transferred from stretcher to procedural table. Fall risk reviewed and comfort measures utilized with interventions. Safety strap applied, position pillows to minimize pressure points. Blankets applied. Pt prepped and draped utilizing standard sterile technique. Patient placed on continuous monitoring, as required by sedation policy. Timeouts implemented utilizing standard universal time-out per department and facility policy. RN to remain at bedside with continuous monitoring. Pt resting comfortably. Denies pain/discomfort. Will continue to monitor. See flow sheets for monitoring, medication administration, and updates. patient verbalizes understanding.

## 2024-11-11 ENCOUNTER — INFUSION (OUTPATIENT)
Dept: INFUSION THERAPY | Facility: HOSPITAL | Age: 74
End: 2024-11-11
Payer: MEDICARE

## 2024-11-11 ENCOUNTER — OFFICE VISIT (OUTPATIENT)
Dept: HEMATOLOGY/ONCOLOGY | Facility: CLINIC | Age: 74
End: 2024-11-11
Payer: MEDICARE

## 2024-11-11 VITALS
TEMPERATURE: 98 F | BODY MASS INDEX: 19.14 KG/M2 | SYSTOLIC BLOOD PRESSURE: 102 MMHG | HEART RATE: 89 BPM | WEIGHT: 121.94 LBS | OXYGEN SATURATION: 97 % | DIASTOLIC BLOOD PRESSURE: 64 MMHG | HEIGHT: 67 IN

## 2024-11-11 VITALS
OXYGEN SATURATION: 100 % | DIASTOLIC BLOOD PRESSURE: 66 MMHG | SYSTOLIC BLOOD PRESSURE: 111 MMHG | TEMPERATURE: 98 F | RESPIRATION RATE: 18 BRPM | HEART RATE: 80 BPM

## 2024-11-11 DIAGNOSIS — K59.03 DRUG-INDUCED CONSTIPATION: ICD-10-CM

## 2024-11-11 DIAGNOSIS — K92.2 LOWER GI BLEED: ICD-10-CM

## 2024-11-11 DIAGNOSIS — C18.9 METASTATIC COLON CANCER TO LIVER: Primary | ICD-10-CM

## 2024-11-11 DIAGNOSIS — E43 SEVERE MALNUTRITION: ICD-10-CM

## 2024-11-11 DIAGNOSIS — T45.1X5A IMMUNODEFICIENCY DUE TO CHEMOTHERAPY: ICD-10-CM

## 2024-11-11 DIAGNOSIS — D64.81 ANEMIA ASSOCIATED WITH CHEMOTHERAPY: ICD-10-CM

## 2024-11-11 DIAGNOSIS — D84.821 IMMUNODEFICIENCY DUE TO CHEMOTHERAPY: ICD-10-CM

## 2024-11-11 DIAGNOSIS — R39.9 LOWER URINARY TRACT SYMPTOMS (LUTS): ICD-10-CM

## 2024-11-11 DIAGNOSIS — T45.1X5A CHEMOTHERAPY INDUCED NEUTROPENIA: ICD-10-CM

## 2024-11-11 DIAGNOSIS — T45.1X5A ANEMIA ASSOCIATED WITH CHEMOTHERAPY: ICD-10-CM

## 2024-11-11 DIAGNOSIS — C78.7 METASTATIC COLON CANCER TO LIVER: Primary | ICD-10-CM

## 2024-11-11 DIAGNOSIS — Z79.899 IMMUNODEFICIENCY DUE TO CHEMOTHERAPY: ICD-10-CM

## 2024-11-11 DIAGNOSIS — D84.81 IMMUNODEFICIENCY SECONDARY TO NEOPLASM: ICD-10-CM

## 2024-11-11 DIAGNOSIS — D70.1 CHEMOTHERAPY INDUCED NEUTROPENIA: ICD-10-CM

## 2024-11-11 DIAGNOSIS — D49.9 IMMUNODEFICIENCY SECONDARY TO NEOPLASM: ICD-10-CM

## 2024-11-11 DIAGNOSIS — I10 HYPERTENSION, UNSPECIFIED TYPE: ICD-10-CM

## 2024-11-11 DIAGNOSIS — R52 PAIN: ICD-10-CM

## 2024-11-11 PROCEDURE — 3074F SYST BP LT 130 MM HG: CPT | Mod: CPTII,S$GLB,, | Performed by: INTERNAL MEDICINE

## 2024-11-11 PROCEDURE — 3288F FALL RISK ASSESSMENT DOCD: CPT | Mod: CPTII,S$GLB,, | Performed by: INTERNAL MEDICINE

## 2024-11-11 PROCEDURE — 3008F BODY MASS INDEX DOCD: CPT | Mod: CPTII,S$GLB,, | Performed by: INTERNAL MEDICINE

## 2024-11-11 PROCEDURE — 63600175 PHARM REV CODE 636 W HCPCS: Performed by: INTERNAL MEDICINE

## 2024-11-11 PROCEDURE — 99215 OFFICE O/P EST HI 40 MIN: CPT | Mod: S$GLB,,, | Performed by: INTERNAL MEDICINE

## 2024-11-11 PROCEDURE — 96416 CHEMO PROLONG INFUSE W/PUMP: CPT

## 2024-11-11 PROCEDURE — 1101F PT FALLS ASSESS-DOCD LE1/YR: CPT | Mod: CPTII,S$GLB,, | Performed by: INTERNAL MEDICINE

## 2024-11-11 PROCEDURE — 1159F MED LIST DOCD IN RCRD: CPT | Mod: CPTII,S$GLB,, | Performed by: INTERNAL MEDICINE

## 2024-11-11 PROCEDURE — 96413 CHEMO IV INFUSION 1 HR: CPT

## 2024-11-11 PROCEDURE — G2211 COMPLEX E/M VISIT ADD ON: HCPCS | Mod: S$GLB,,, | Performed by: INTERNAL MEDICINE

## 2024-11-11 PROCEDURE — 25000003 PHARM REV CODE 250: Performed by: INTERNAL MEDICINE

## 2024-11-11 PROCEDURE — 96415 CHEMO IV INFUSION ADDL HR: CPT

## 2024-11-11 PROCEDURE — 96375 TX/PRO/DX INJ NEW DRUG ADDON: CPT

## 2024-11-11 PROCEDURE — 99999 PR PBB SHADOW E&M-EST. PATIENT-LVL III: CPT | Mod: PBBFAC,,, | Performed by: INTERNAL MEDICINE

## 2024-11-11 PROCEDURE — 96367 TX/PROPH/DG ADDL SEQ IV INF: CPT

## 2024-11-11 PROCEDURE — 3078F DIAST BP <80 MM HG: CPT | Mod: CPTII,S$GLB,, | Performed by: INTERNAL MEDICINE

## 2024-11-11 PROCEDURE — 1126F AMNT PAIN NOTED NONE PRSNT: CPT | Mod: CPTII,S$GLB,, | Performed by: INTERNAL MEDICINE

## 2024-11-11 RX ORDER — PROCHLORPERAZINE EDISYLATE 5 MG/ML
5 INJECTION INTRAMUSCULAR; INTRAVENOUS ONCE AS NEEDED
Status: DISCONTINUED | OUTPATIENT
Start: 2024-11-11 | End: 2024-11-11 | Stop reason: HOSPADM

## 2024-11-11 RX ORDER — HEPARIN 100 UNIT/ML
500 SYRINGE INTRAVENOUS
Status: DISCONTINUED | OUTPATIENT
Start: 2024-11-11 | End: 2024-11-11 | Stop reason: HOSPADM

## 2024-11-11 RX ORDER — EPINEPHRINE 0.3 MG/.3ML
0.3 INJECTION SUBCUTANEOUS ONCE AS NEEDED
Status: DISCONTINUED | OUTPATIENT
Start: 2024-11-11 | End: 2024-11-11 | Stop reason: HOSPADM

## 2024-11-11 RX ORDER — PROCHLORPERAZINE EDISYLATE 5 MG/ML
5 INJECTION INTRAMUSCULAR; INTRAVENOUS ONCE AS NEEDED
Status: CANCELLED
Start: 2024-11-12

## 2024-11-11 RX ORDER — ATROPINE SULFATE 0.4 MG/ML
0.4 INJECTION, SOLUTION ENDOTRACHEAL; INTRAMEDULLARY; INTRAMUSCULAR; INTRAVENOUS; SUBCUTANEOUS ONCE AS NEEDED
Status: COMPLETED | OUTPATIENT
Start: 2024-11-11 | End: 2024-11-11

## 2024-11-11 RX ORDER — ATROPINE SULFATE 0.4 MG/ML
0.4 INJECTION, SOLUTION ENDOTRACHEAL; INTRAMEDULLARY; INTRAMUSCULAR; INTRAVENOUS; SUBCUTANEOUS ONCE AS NEEDED
Status: CANCELLED | OUTPATIENT
Start: 2024-11-12

## 2024-11-11 RX ORDER — SODIUM CHLORIDE 0.9 % (FLUSH) 0.9 %
10 SYRINGE (ML) INJECTION
Status: CANCELLED | OUTPATIENT
Start: 2024-11-14

## 2024-11-11 RX ORDER — SODIUM CHLORIDE 0.9 % (FLUSH) 0.9 %
10 SYRINGE (ML) INJECTION
Status: DISCONTINUED | OUTPATIENT
Start: 2024-11-11 | End: 2024-11-11 | Stop reason: HOSPADM

## 2024-11-11 RX ORDER — DIPHENHYDRAMINE HYDROCHLORIDE 50 MG/ML
50 INJECTION INTRAMUSCULAR; INTRAVENOUS ONCE AS NEEDED
Status: CANCELLED | OUTPATIENT
Start: 2024-11-12

## 2024-11-11 RX ORDER — HEPARIN 100 UNIT/ML
500 SYRINGE INTRAVENOUS
Status: CANCELLED | OUTPATIENT
Start: 2024-11-12

## 2024-11-11 RX ORDER — DIPHENHYDRAMINE HYDROCHLORIDE 50 MG/ML
50 INJECTION INTRAMUSCULAR; INTRAVENOUS ONCE AS NEEDED
Status: DISCONTINUED | OUTPATIENT
Start: 2024-11-11 | End: 2024-11-11 | Stop reason: HOSPADM

## 2024-11-11 RX ORDER — HEPARIN 100 UNIT/ML
500 SYRINGE INTRAVENOUS
Status: CANCELLED | OUTPATIENT
Start: 2024-11-14

## 2024-11-11 RX ORDER — SODIUM CHLORIDE 0.9 % (FLUSH) 0.9 %
10 SYRINGE (ML) INJECTION
Status: CANCELLED | OUTPATIENT
Start: 2024-11-12

## 2024-11-11 RX ORDER — EPINEPHRINE 0.3 MG/.3ML
0.3 INJECTION SUBCUTANEOUS ONCE AS NEEDED
Status: CANCELLED | OUTPATIENT
Start: 2024-11-12

## 2024-11-11 RX ADMIN — DEXAMETHASONE SODIUM PHOSPHATE 0.25 MG: 4 INJECTION, SOLUTION INTRA-ARTICULAR; INTRALESIONAL; INTRAMUSCULAR; INTRAVENOUS; SOFT TISSUE at 12:11

## 2024-11-11 RX ADMIN — FLUOROURACIL 3695 MG: 50 INJECTION, SOLUTION INTRAVENOUS at 02:11

## 2024-11-11 RX ADMIN — SODIUM CHLORIDE 240 MG: 9 INJECTION, SOLUTION INTRAVENOUS at 12:11

## 2024-11-11 RX ADMIN — ATROPINE SULFATE 0.4 MG: 0.4 INJECTION, SOLUTION INTRAMUSCULAR; INTRAVENOUS; SUBCUTANEOUS at 12:11

## 2024-11-11 NOTE — PROGRESS NOTES
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 Bonilla  12/30/2022: Y-90 to R liver  1/27/2023: Y-90 to R liver  5/17/2023: Y-90 to R liver  7/11/2023: C1 FOLFIRI + Pema  7/26/2023: C2 FOLFIRI + Epma  8/8/2023: C3 FOLFIRI + Pema  8/22/23: C4 FOLFIRI + Pema  Restaging CT CAP 9/1/23: Good response to chemo.  9/7/23: C5 FOLFIRI + Pema  ......................................  Restaging CT CAP 10/12/23: Good response to chemo  10/16/23: C8 FOLFIRI + Pema  .......................................  Restaging CT CAP 12/8/23: Good response to chemo  12/11/23: C12 FOLFIRI + Pema  .......................................  Restaging CT CAP 2/5/24: Good response to chemo  2/8/24: C16 FOLFIRI + Pema    Restaging CT CAP 4/26/24: Good response to chemo  Restaging CT CAP 6/6/24: Good response to chemo  Restaging CT CAP 8/1/24: Stable disease  Restaging CT CAP 10/11/24: Progression of R liver lobe mass.  Plan for Y-90 to this.      Interval History:   He presents today with his daughter prior to cycle 34 of FOLFIRI (off Avastin due to GIB).   He is feeling ok. Underwent Y90 mapping on 11/5/24.  Denies any further pain (was having some lower back pain).  Denies any blood in his stool.    ECOG status is 1. Presents with his daughter today.    Past Medical History:   Past Medical History:    Diagnosis Date    MARIA A (acute kidney injury) 2022    Hypertension     Metastatic colon cancer to liver 2021     Past Surgical HIstory:   Past Surgical History:   Procedure Laterality Date    COLONOSCOPY N/A 2021    Procedure: COLONOSCOPY with possible stent;  Surgeon: EDILMA Bonilla MD;  Location: [facility] ENDO (2ND FLR);  Service: Colon and Rectal;  Laterality: N/A;    COLONOSCOPY N/A 11/3/2023    Procedure: COLONOSCOPY;  Surgeon: EDILMA Bonilla MD;  Location: [facility] ENDO (4TH FLR);  Service: Endoscopy;  Laterality: N/A;  prep instructions sent to pt via portal  From: EDILMA Bonilla MD  Procedure: Colonoscopy  Diagnosis: Surveillance colonoscopy - Hx of colon cancer  Procedure Timin-12 weeks  Provider: Myself  [place] [facility]  Additional Scheduling Information: No scheduling con    COLONOSCOPY N/A 7/10/2024    Procedure: COLONOSCOPY;  Surgeon: Tam aPntoja MD;  Location: [facility] ENDO (2ND FLR);  Service: Endoscopy;  Laterality: N/A;    DIAGNOSTIC LAPAROSCOPY N/A 2022    Procedure: LAPAROSCOPY, DIAGNOSTIC;  Surgeon: Adrian Rodriguez MD;  Location: [facility] OR 2ND FLR;  Service: General;  Laterality: N/A;    INSERTION OF TUNNELED CENTRAL VENOUS CATHETER (CVC) WITH SUBCUTANEOUS PORT N/A 5/3/2021    Procedure: [id number];  Surgeon: Francisco Layton MD;  [facility];  Service: Vascular;  Laterality: N/A;    OMENTECTOMY N/A 10/20/2022    Procedure: OMENTECTOMY;  Surgeon: EDILMA Bonilla MD;  Location: [facility] OR 2ND FLR;  Service: Colon and Rectal;  Laterality: N/A;    RIGHT HEMICOLECTOMY N/A 10/20/2022    Procedure: HEMICOLECTOMY, RIGHT, extended;  Surgeon: EDILMA Bonilla MD;  [facility];  Service: Colon and Rectal;  Laterality: N/A;  Extended right hemicolectomy CONSENT IN AM     Family History:   Family History   Problem Relation Name Age of Onset    Cancer Brother         Social History:  reports that he has never smoked. He has never used smokeless  tobacco. He reports that he does not currently use alcohol. He reports that he does not use drugs.    Allergies:  Review of patient's allergies indicates:  No Known Allergies    Medications:  Current Outpatient Medications   Medication Sig Dispense Refill    acetaminophen (TYLENOL) 500 MG tablet Take 1 tablet (500 mg total) by mouth every 6 (six) hours as needed for Pain (alternate with ibuprofen).  0    amLODIPine (NORVASC) 10 MG tablet Take 1 tablet (10 mg total) by mouth once daily. 90 tablet 3    LIDOcaine-prilocaine (EMLA) cream Apply topically as needed (port application). 30 g 3    ondansetron (ZOFRAN) 4 MG tablet Take 1 tablet (4 mg total) by mouth every 8 (eight) hours as needed for Nausea. 30 tablet 3    oxyCODONE (ROXICODONE) 5 MG immediate release tablet Take 1 tablet (5 mg total) by mouth every 6 (six) hours as needed for Pain. 20 tablet 0    pantoprazole (PROTONIX) 40 MG tablet Take 1 tablet (40 mg total) by mouth 2 (two) times daily before meals. 180 tablet 3    tamsulosin (FLOMAX) 0.4 mg Cap Take 1 capsule (0.4 mg total) by mouth once daily. 30 capsule 5    traMADoL (ULTRAM) 50 mg tablet Take 1 tablet (50 mg total) by mouth every 6 (six) hours as needed for Pain. 30 tablet 0     No current facility-administered medications for this visit.     Review of Systems   Constitutional:  Negative for activity change and appetite change.   Eyes:  Negative for visual disturbance.   Respiratory:  Negative for cough and shortness of breath.    Cardiovascular:  Negative for chest pain and leg swelling.   Gastrointestinal:  Positive for abdominal pain. Negative for abdominal distention, blood in stool, constipation, diarrhea, nausea and vomiting.   Musculoskeletal:  Positive for back pain. Negative for arthralgias.   Skin:  Negative for wound.   Neurological:  Negative for dizziness, weakness and numbness.   Psychiatric/Behavioral:  Negative for confusion and sleep disturbance. The patient is not nervous/anxious.   "  All other systems reviewed and are negative.    ECOG Performance Status: 1     Objective:      Vitals:   Vitals:    11/11/24 1055   BP: 102/64   BP Location: Left arm   Patient Position: Sitting   Pulse: 89   Temp: 98.4 °F (36.9 °C)   TempSrc: Temporal   SpO2: 97%   Weight: 55.3 kg (121 lb 14.6 oz)   Height: 5' 7" (1.702 m)             Physical Exam  Vitals reviewed.   Constitutional:       General: He is not in acute distress.     Appearance: Normal appearance. He is not ill-appearing, toxic-appearing or diaphoretic.   HENT:      Head: Normocephalic and atraumatic.      Right Ear: External ear normal.      Left Ear: External ear normal.      Nose: Nose normal.      Mouth/Throat:      Pharynx: Oropharynx is clear.   Eyes:      General: No scleral icterus.     Extraocular Movements: Extraocular movements intact.      Conjunctiva/sclera: Conjunctivae normal.      Pupils: Pupils are equal, round, and reactive to light.   Cardiovascular:      Rate and Rhythm: Normal rate and regular rhythm.      Pulses: Normal pulses.      Heart sounds: Normal heart sounds. No murmur heard.  Pulmonary:      Effort: Pulmonary effort is normal. No respiratory distress.      Breath sounds: Normal breath sounds. No wheezing.   Chest:      Comments: Port to RCW, no signs of infection.  Abdominal:      General: Abdomen is flat. Bowel sounds are normal. There is no distension.      Palpations: Abdomen is soft. There is no mass.      Tenderness: There is no abdominal tenderness.      Comments: Vertical midline abdominal wound well healed   Musculoskeletal:         General: No swelling or deformity. Normal range of motion.      Right lower leg: No edema.      Left lower leg: No edema.   Skin:     Coloration: Skin is not jaundiced or pale.      Findings: No bruising, erythema or rash.   Neurological:      General: No focal deficit present.      Mental Status: He is alert and oriented to person, place, and time. Mental status is at baseline.     "  Cranial Nerves: No cranial nerve deficit.      Sensory: No sensory deficit.      Gait: Gait normal.   Psychiatric:         Mood and Affect: Mood normal.         Behavior: Behavior normal.         Thought Content: Thought content normal.         Judgment: Judgment normal.     Laboratory Data:  Lab Visit on 11/11/2024   Component Date Value Ref Range Status    Sodium 11/11/2024 137  136 - 145 mmol/L Final    Potassium 11/11/2024 3.1 (L)  3.5 - 5.1 mmol/L Final    Chloride 11/11/2024 107  95 - 110 mmol/L Final    CO2 11/11/2024 22 (L)  23 - 29 mmol/L Final    Glucose 11/11/2024 100  70 - 110 mg/dL Final    BUN 11/11/2024 8  8 - 23 mg/dL Final    Creatinine 11/11/2024 0.8  0.5 - 1.4 mg/dL Final    Calcium 11/11/2024 8.6 (L)  8.7 - 10.5 mg/dL Final    Total Protein 11/11/2024 6.7  6.0 - 8.4 g/dL Final    Albumin 11/11/2024 2.6 (L)  3.5 - 5.2 g/dL Final    Total Bilirubin 11/11/2024 0.7  0.1 - 1.0 mg/dL Final    Comment: For infants and newborns, interpretation of results should be based  on gestational age, weight and in agreement with clinical  observations.    Premature Infant recommended reference ranges:  Up to 24 hours.............<8.0 mg/dL  Up to 48 hours............<12.0 mg/dL  3-5 days..................<15.0 mg/dL  6-29 days.................<15.0 mg/dL      Alkaline Phosphatase 11/11/2024 171 (H)  40 - 150 U/L Final    AST 11/11/2024 29  10 - 40 U/L Final    ALT 11/11/2024 10  10 - 44 U/L Final    eGFR 11/11/2024 >60.0  >60 mL/min/1.73 m^2 Final    Anion Gap 11/11/2024 8  8 - 16 mmol/L Final    WBC 11/11/2024 13.88 (H)  3.90 - 12.70 K/uL Final    RBC 11/11/2024 3.43 (L)  4.60 - 6.20 M/uL Final    Hemoglobin 11/11/2024 8.3 (L)  14.0 - 18.0 g/dL Final    Hematocrit 11/11/2024 29.7 (L)  40.0 - 54.0 % Final    MCV 11/11/2024 87  82 - 98 fL Final    MCH 11/11/2024 24.2 (L)  27.0 - 31.0 pg Final    MCHC 11/11/2024 27.9 (L)  32.0 - 36.0 g/dL Final    RDW 11/11/2024 23.4 (H)  11.5 - 14.5 % Final    Platelets 11/11/2024  104 (L)  150 - 450 K/uL Final    MPV 11/11/2024 10.4  9.2 - 12.9 fL Final    Gran # (ANC) 11/11/2024 9.7 (H)  1.8 - 7.7 K/uL Final    Comment: The ANC is based on a white cell differential from an   automated cell counter. It has not been microscopically   reviewed for the presence of abnormal cells. Clinical   correlation is required.      Immature Grans (Abs) 11/11/2024 0.64 (H)  0.00 - 0.04 K/uL Final    Comment: Mild elevation in immature granulocytes is non specific and   can be seen in a variety of conditions including stress response,   acute inflammation, trauma and pregnancy. Correlation with other   laboratory and clinical findings is essential.       Assessment and Plan        1. Metastatic colon cancer to liver    2. Immunodeficiency secondary to neoplasm    3. Immunodeficiency due to chemotherapy    4. Chemotherapy induced neutropenia    5. Lower GI bleed    6. Anemia associated with chemotherapy    7. Pain    8. Drug-induced constipation    9. Severe malnutrition    10. Hypertension, unspecified type    11. Lower urinary tract symptoms (LUTS)          1-6.  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  Guardant 360 was negative for BRAF, VIRI, HER2 amplification.   Pretreatment CEA 57.  We had a long and sonia discussion with him about his diagnosis. Unfortunately, the disease is not curable but remains treatable and he has good performance status.   Restaging scans after 7 cycles of FOLFOX + Pema showed significant reduction in colonic mass and liver mass.   we switched to maintenance as of cycle 8 with 5FU + Pema  Restaging scans from March 2022 show stable disease.   MRI on 7/18/22 showing hepatic progression of disease in the right hepatic lobe noted on the exam. CT CAP on 8/26 shows some mild progression in both liver metastases.    Discussed case at colorectal tumor board 8/31/22 and had a diagnostic lap performed by Dr. Rodriguez on 9/7 to assess for any occult  peritoneal disease. Findings from the diagnostic lap were negative. Fluid from around from around the primary mass was sent for cytology that was negative for malignancy.   Underwent primary tumor resection on 10/20/22 with Dr. Bonilla.    Meanwhile his liver mets had grown.  We discussed his case at [facility] conference with plans for short term Y-90 and then restarting chemotherapy prior to considering any surgical options to his liver metastases.  He underwent Y-90 delivery on 12/30/22. Tolerated well.   CT CAP on 1/23/23 reviewed at [facility] conference with good response to Y-90, no new lesions.  Underwent second Y-90 on 1/27/23. Can consider R hepatectomy pending follow-up imaging after Y-90.     Received cycle 42 of FOLFOX (omitted oxaliplatin again starting cycle 41 due to neuropathy) on 2/9/23.  Because of 5-FU shortage nationally, we have been holding chemotherapy since mid-February 2023.  If he needs to restart chemotherapy (I.e. no plans for surgical resection), will place him on capecitabine maintenance for duration of 5-FU shortage.     Discussed at colorectal liver mets tumor board 3/16/23.  Plan for repeat Y-90 and then consideration of surgical resection and he will meet with liver transplant team as well.  Underwent Y-90 delivery 5/17/23 with IR.     Has been on maintenance Xeloda since late April 2023.    Met with liver transplant team but ultimately opted not to proceed with transplant as he was concerned about how he would tolerate a major surgery with the life changes that would come after transplant as well. They closed out his case.    He met with Dr. Rodriguez to discuss whether surgical resection is indicated for his liver mets.  He recommended repeat MRI.  Repeat MRI unfortunately showed disease progression while on Xeloda maintenance.  Similarly his CEA garrett precipitously, all in keeping with worsening disease.    We recommended we restart IV chemotherapy with FOLFIRI + Avastin (never had  irinotecan).    Because of hyperbilirubinemia he received cycle 1 with just 5-FU + Avastin on 7/11/23. Tolerated this well. Bilirubin has improved.  Received irinotecan starting with cycle 2.  Repeat CT CAP after cycle 4 shows good response to therapy.   Excellent tolerance. [facility] tumor board agrees with continuation of chemotherapy.   Repeat CT CAP after cycle 7 shows stable disease, no evidence of new or worsening disease.   Repeat CT CAP after cycle 11 shows stable disease.   Repeat CT CAP after cycle 15 shows improved disease.  Repeat CT CAP after cycle 21 shows stable disease.   Repeat CT CAP after cycle 24 shows stable disease.    Hospitalized after cycle 26 and 27 (without Avastin) for hematochezia.  Colonoscopy with likely diverticular bleed.  Required 2 units of blood in last month.  Will continue to hold bevacizumab indefinitely.  Discussed that we don't have a very good way preventing this recurrence and chemo with its associated thrombocytopenia may make this more likely to occur again.  He still wishes to proceed, which is my recommendation as well.    Repeat CT CAP after cycle 28 shows stable disease and recc to continue with chemotherapy.   Repeat CT CAP after cycle 32 shows progression of R liver lobe metastasis with rising CEA.  Will plan for Y-90 to growing lesion.  Underwent mapping 11/5/24.    Will continue FOLFIRI in the meantime.  Delayed cycle 33 due to neutropenia - has since recovered.  Proceed with cycle 34 of FOLFIRI today + OBI Neulasta.  RTC in 2 weeks.    Tempus xT: APC, CKS1B cng, ERCC3 cnl, PIK3CA; KENIA, TMB 12.1.  Tempus xF: APC, KRAS Q61H, PIK3CA, TP53; KENIA    7-9. Neoplasm related pain, weight loss, constipation, malnutrition  -- Pain very well controlled. Has oxycodone 5mg to use PRN but not requiring now.  -- Following with nutrition. Encouraged increased protein. Monitor.  Weight down slightly but reports good appetite.  -- Continue Miralax daily for constipation. Doing well with  this.     10. Hypertension   -- BP low normal today. Asymptomatic.   -- Following with PCP.   -- encouraged him and his daughter to monitor BP and HR closely at home.     11. Urinary Hesitancy.  -- Refilled Flomax.   -- Reported improvement since starting medication.       Pte and family members displayed understanding of the above encounter and treatment plan. All thoughtful questions were answered to their satisfaction. Pte was advised to notify the care team or proceed to the ER if signs and symptoms worsen.     Visit today included increased complexity associated with the care of the episodic problem chemotherapy addressed and managing the longitudinal care of the patient due to the serious and/or complex managed problem(s) GI malignancies/cancer    Bunny Schuster MD  Hematology/Oncology  [facility]             Route Chart for Scheduling    Med Onc Chart Routing      Follow up with physician 4 weeks. for FOLFIRI   Follow up with RACHEL 2 weeks. for FOLFIRI   Infusion scheduling note    Injection scheduling note    Labs CBC, CMP and CEA   Scheduling:  Preferred lab:  Lab interval:     Imaging    Pharmacy appointment    Other referrals

## 2024-11-11 NOTE — PLAN OF CARE
Problem: Chemotherapy Effects  Goal: Anemia Symptom Improvement  Outcome: Progressing  Goal: Safety Maintained  Outcome: Progressing  Goal: Absence of Hematuria  Outcome: Progressing  Goal: Nausea and Vomiting Relief  Outcome: Progressing  Goal: Neurotoxicity Symptom Control  Outcome: Progressing  Goal: Absence of Infection  Outcome: Progressing  Goal: Absence of Bleeding  Outcome: Progressing       Ambulatory to clinic with family.  No c/o adverse effects or s/s of infection.  PAC accessed, flushed with out difficulty, blood return noted and infused with no problems.  Folfiri tolerated with no problems.  5FU attached via CADD pump, screen read RUN and volume decreased.  RTC, Wednesday, 11/13/2024, at 12:30  for pump dc.  Ambulatory home.  NAD.

## 2024-11-13 ENCOUNTER — INFUSION (OUTPATIENT)
Dept: INFUSION THERAPY | Facility: HOSPITAL | Age: 74
End: 2024-11-13
Payer: MEDICARE

## 2024-11-13 VITALS
OXYGEN SATURATION: 100 % | RESPIRATION RATE: 18 BRPM | TEMPERATURE: 98 F | HEART RATE: 92 BPM | SYSTOLIC BLOOD PRESSURE: 98 MMHG | DIASTOLIC BLOOD PRESSURE: 57 MMHG

## 2024-11-13 DIAGNOSIS — C18.9 METASTATIC COLON CANCER TO LIVER: Primary | ICD-10-CM

## 2024-11-13 DIAGNOSIS — C78.7 METASTATIC COLON CANCER TO LIVER: Primary | ICD-10-CM

## 2024-11-13 PROCEDURE — 96377 APPLICATON ON-BODY INJECTOR: CPT

## 2024-11-13 PROCEDURE — 63600175 PHARM REV CODE 636 W HCPCS: Performed by: INTERNAL MEDICINE

## 2024-11-13 PROCEDURE — 25000003 PHARM REV CODE 250: Performed by: INTERNAL MEDICINE

## 2024-11-13 PROCEDURE — A4216 STERILE WATER/SALINE, 10 ML: HCPCS | Performed by: INTERNAL MEDICINE

## 2024-11-13 RX ORDER — SODIUM CHLORIDE 0.9 % (FLUSH) 0.9 %
10 SYRINGE (ML) INJECTION
Status: DISCONTINUED | OUTPATIENT
Start: 2024-11-13 | End: 2024-11-13 | Stop reason: HOSPADM

## 2024-11-13 RX ORDER — HEPARIN 100 UNIT/ML
500 SYRINGE INTRAVENOUS
Status: DISCONTINUED | OUTPATIENT
Start: 2024-11-13 | End: 2024-11-13 | Stop reason: HOSPADM

## 2024-11-13 RX ADMIN — PEGFILGRASTIM 6 MG: KIT SUBCUTANEOUS at 12:11

## 2024-11-13 RX ADMIN — Medication 10 ML: at 12:11

## 2024-11-13 RX ADMIN — HEPARIN 500 UNITS: 100 SYRINGE at 12:11

## 2024-11-13 NOTE — PLAN OF CARE
Problem: Chemotherapy Effects  Goal: Anemia Symptom Improvement  Outcome: Progressing  Goal: Safety Maintained  Outcome: Progressing  Goal: Absence of Hematuria  Outcome: Progressing  Goal: Nausea and Vomiting Relief  Outcome: Progressing  Goal: Neurotoxicity Symptom Control  Outcome: Progressing  Goal: Absence of Infection  Outcome: Progressing  Goal: Absence of Bleeding  Outcome: Progressing     Ambulatory to clinic with family.  No c/o adverse effects or s/s of infection.  CADD pump read STOP.  PAC flushed with out difficulty, blood return noted, heparinized and needle removed. OBI attached to left arm, screen read FULL, and green light flashed.  Ambulatory home with family.  NAD.

## 2024-11-18 ENCOUNTER — TELEPHONE (OUTPATIENT)
Dept: INTERVENTIONAL RADIOLOGY/VASCULAR | Facility: HOSPITAL | Age: 74
End: 2024-11-18
Payer: MEDICARE

## 2024-11-18 NOTE — NURSING
Pre-procedure call complete.  Spoke to patients daughter.     No food after midnight.  Patient may drink clear liquids (sports drinks, clear juices) until 2 hours before arrival time.  Clear liquids include only water, black coffee (no creamer), clear oral rehydration drinks, clear sports drinks and clear fruit juices.  Clear liquids do NOT include anything with pulp or food particles (chicken broth, ice cream, yogurt, jello, etc).  NO orange juice, pulpy juices, or apple cider.  If you can read newsprint through the liquid, it qualifies as clear.  IF UNSURE, drink water instead.      Patient to have NOTHING BY MOUTH 2 hours before arrival time.  Medication the morning of your procedure may be taken with a sip of water.    Aware will need someone to provide transport home and monitor pt 8 hours post procedure.  No driving for at least 24 hours after procedure.   Advised to have patient to take blood pressure medications with a sip of water morning of procedure.  Verbalized aware of which medications to take.  Do not sleep medication (including OTC) the night before procedure.  Arrival time and location given.  Expected length of stay reviewed.  Covid screening completed.  Patients daughter verbalized understanding of all pre-procedure instructions.  Written instructions and directions sent to patient in Mychart/portal.

## 2024-11-21 ENCOUNTER — HOSPITAL ENCOUNTER (OUTPATIENT)
Dept: INTERVENTIONAL RADIOLOGY/VASCULAR | Facility: HOSPITAL | Age: 74
Discharge: HOME | End: 2024-11-21
Payer: MEDICARE

## 2024-11-21 ENCOUNTER — HOSPITAL ENCOUNTER (OUTPATIENT)
Dept: RADIOLOGY | Facility: HOSPITAL | Age: 74
Discharge: HOME | End: 2024-11-21
Payer: MEDICARE

## 2024-11-21 VITALS
SYSTOLIC BLOOD PRESSURE: 111 MMHG | DIASTOLIC BLOOD PRESSURE: 72 MMHG | RESPIRATION RATE: 17 BRPM | WEIGHT: 121 LBS | BODY MASS INDEX: 18.99 KG/M2 | TEMPERATURE: 98 F | HEART RATE: 76 BPM | HEIGHT: 67 IN | OXYGEN SATURATION: 100 %

## 2024-11-21 DIAGNOSIS — C78.7 METASTATIC COLON CANCER TO LIVER: ICD-10-CM

## 2024-11-21 DIAGNOSIS — C18.9 METASTATIC COLON CANCER TO LIVER: ICD-10-CM

## 2024-11-21 DIAGNOSIS — C18.9 METASTATIC COLON CANCER TO LIVER: Primary | ICD-10-CM

## 2024-11-21 DIAGNOSIS — C78.7 METASTATIC COLON CANCER TO LIVER: Primary | ICD-10-CM

## 2024-11-21 LAB
ANISOCYTOSIS BLD QL SMEAR: SLIGHT
BASOPHILS # BLD AUTO: ABNORMAL K/UL (ref 0–0.2)
BASOPHILS NFR BLD: 0 % (ref 0–1.9)
DIFFERENTIAL METHOD BLD: ABNORMAL
EOSINOPHIL # BLD AUTO: ABNORMAL K/UL (ref 0–0.5)
EOSINOPHIL NFR BLD: 1 % (ref 0–8)
ERYTHROCYTE [DISTWIDTH] IN BLOOD BY AUTOMATED COUNT: 24.1 % (ref 11.5–14.5)
HCT VFR BLD AUTO: 29.8 % (ref 40–54)
HGB BLD-MCNC: 8.4 G/DL (ref 14–18)
HYPOCHROMIA BLD QL SMEAR: ABNORMAL
IMM GRANULOCYTES # BLD AUTO: ABNORMAL K/UL (ref 0–0.04)
IMM GRANULOCYTES NFR BLD AUTO: ABNORMAL % (ref 0–0.5)
INR PPP: 1.1 (ref 0.8–1.2)
LYMPHOCYTES # BLD AUTO: ABNORMAL K/UL (ref 1–4.8)
LYMPHOCYTES NFR BLD: 13 % (ref 18–48)
MCH RBC QN AUTO: 25 PG (ref 27–31)
MCHC RBC AUTO-ENTMCNC: 28.2 G/DL (ref 32–36)
MCV RBC AUTO: 89 FL (ref 82–98)
MONOCYTES # BLD AUTO: ABNORMAL K/UL (ref 0.3–1)
MONOCYTES NFR BLD: 6 % (ref 4–15)
NEUTROPHILS NFR BLD: 78 % (ref 38–73)
NEUTS BAND NFR BLD MANUAL: 2 %
NRBC BLD-RTO: 1 /100 WBC
OVALOCYTES BLD QL SMEAR: ABNORMAL
PLATELET # BLD AUTO: 208 K/UL (ref 150–450)
PMV BLD AUTO: 11.5 FL (ref 9.2–12.9)
POIKILOCYTOSIS BLD QL SMEAR: SLIGHT
POLYCHROMASIA BLD QL SMEAR: ABNORMAL
PROTHROMBIN TIME: 12.1 SEC (ref 9–12.5)
RBC # BLD AUTO: 3.36 M/UL (ref 4.6–6.2)
SPHEROCYTES BLD QL SMEAR: ABNORMAL
WBC # BLD AUTO: 9.18 K/UL (ref 3.9–12.7)

## 2024-11-21 PROCEDURE — 78201 LIVER IMAGING STATIC ONLY: CPT | Mod: TC

## 2024-11-21 PROCEDURE — 63600175 PHARM REV CODE 636 W HCPCS

## 2024-11-21 PROCEDURE — 63600175 PHARM REV CODE 636 W HCPCS: Performed by: RADIOLOGY

## 2024-11-21 PROCEDURE — C1894 INTRO/SHEATH, NON-LASER: HCPCS

## 2024-11-21 PROCEDURE — C1769 GUIDE WIRE: HCPCS

## 2024-11-21 PROCEDURE — C2616 BRACHYTX, NON-STR,YTTRIUM-90: HCPCS

## 2024-11-21 PROCEDURE — 85007 BL SMEAR W/DIFF WBC COUNT: CPT

## 2024-11-21 PROCEDURE — 36415 COLL VENOUS BLD VENIPUNCTURE: CPT

## 2024-11-21 PROCEDURE — 85610 PROTHROMBIN TIME: CPT

## 2024-11-21 PROCEDURE — 78830 RP LOCLZJ TUM SPECT W/CT 1: CPT | Mod: TC

## 2024-11-21 PROCEDURE — C1887 CATHETER, GUIDING: HCPCS

## 2024-11-21 PROCEDURE — 85027 COMPLETE CBC AUTOMATED: CPT

## 2024-11-21 RX ORDER — SODIUM CHLORIDE 9 MG/ML
INJECTION, SOLUTION INTRAVENOUS CONTINUOUS
Status: DISCONTINUED | OUTPATIENT
Start: 2024-11-21 | End: 2024-11-22 | Stop reason: HOSPADM

## 2024-11-21 RX ORDER — LIDOCAINE HYDROCHLORIDE 10 MG/ML
1 INJECTION, SOLUTION EPIDURAL; INFILTRATION; INTRACAUDAL; PERINEURAL ONCE AS NEEDED
Status: COMPLETED | OUTPATIENT
Start: 2024-11-21 | End: 2024-11-21

## 2024-11-21 RX ORDER — MIDAZOLAM HYDROCHLORIDE 1 MG/ML
INJECTION, SOLUTION INTRAMUSCULAR; INTRAVENOUS
Status: COMPLETED | OUTPATIENT
Start: 2024-11-21 | End: 2024-11-21

## 2024-11-21 RX ORDER — ONDANSETRON HYDROCHLORIDE 2 MG/ML
4 INJECTION, SOLUTION INTRAVENOUS EVERY 6 HOURS PRN
Status: DISCONTINUED | OUTPATIENT
Start: 2024-11-21 | End: 2024-11-22 | Stop reason: HOSPADM

## 2024-11-21 RX ORDER — DEXAMETHASONE SODIUM PHOSPHATE 10 MG/ML
INJECTION INTRAMUSCULAR; INTRAVENOUS
Status: COMPLETED | OUTPATIENT
Start: 2024-11-21 | End: 2024-11-21

## 2024-11-21 RX ORDER — FENTANYL CITRATE 50 UG/ML
INJECTION, SOLUTION INTRAMUSCULAR; INTRAVENOUS
Status: COMPLETED | OUTPATIENT
Start: 2024-11-21 | End: 2024-11-21

## 2024-11-21 RX ADMIN — MIDAZOLAM HYDROCHLORIDE 0.5 MG: 2 INJECTION, SOLUTION INTRAMUSCULAR; INTRAVENOUS at 02:11

## 2024-11-21 RX ADMIN — NITROGLYCERIN 200 MCG: 20 INJECTION INTRAVENOUS at 02:11

## 2024-11-21 RX ADMIN — LIDOCAINE HYDROCHLORIDE 10 MG: 10 INJECTION, SOLUTION EPIDURAL; INFILTRATION; INTRACAUDAL; PERINEURAL at 11:11

## 2024-11-21 RX ADMIN — DEXAMETHASONE SODIUM PHOSPHATE 10 MG: 10 INJECTION INTRAMUSCULAR; INTRAVENOUS at 03:11

## 2024-11-21 RX ADMIN — FENTANYL CITRATE 25 MCG: 50 INJECTION, SOLUTION INTRAMUSCULAR; INTRAVENOUS at 02:11

## 2024-11-21 NOTE — CARE UPDATE
Nuclear Medicine scan complete at this time, patient tolerated scan well, patient will be transported to recovery via stretcher, report to be given at bedside.

## 2024-11-21 NOTE — PLAN OF CARE
Pt arrived to [facility] for Y90. Pt oriented to unit and staff, Pt safely transferred from stretcher to procedural table. Fall risk reviewed and comfort measures utilized with interventions. Safety strap applied, position pillows to minimize pressure points. Blankets applied. Pt prepped and draped utilizing standard sterile technique. Patient placed on continuous monitoring, as required by sedation policy. Timeouts implemented utilizing standard universal time-out per department and facility policy. RN to remain at bedside with continuous monitoring. Pt resting comfortably. Denies pain/discomfort. Will continue to monitor. See flow sheets for monitoring, medication administration, and updates. patient verbalizes understanding.

## 2024-11-21 NOTE — NURSING
Procedure recovery complete. VSS. Procedure site dressing to R groin is clean, dry, and intact. Patient tolerated PO nutrition with no N/V and voided without difficulty, ambulated to restroom with steady gait. Patient verbalizes understanding of discharge instructions including when to call the doctor. PIV removed. Patient discharged via wheelchair.

## 2024-11-21 NOTE — NURSING
Patient arrived to [facility] s/p Y-90. Dressing to R groin is clean and dry. Cardiac monitoring continued. Fall precautions reviewed. Bed in lowest, locked position. Call light within reach.    abd pain

## 2024-11-21 NOTE — H&P
Outpatient Radiology Pre-procedure Note    History of Present Illness:  Javier Downs is a 74 y.o. male with a history of metastatic colon cancer with metastases to the liver with a history of y90 treatments, most recently 23 who presents for y90 radioembolization.      Past Medical History:   Diagnosis Date    MARIA A (acute kidney injury) 2022    Hypertension     Metastatic colon cancer to liver 2021     Past Surgical History:   Procedure Laterality Date    COLONOSCOPY N/A 2021    Procedure: COLONOSCOPY with possible stent;  Surgeon: EDILMA Bonilla MD;  Location: [facility] ENDO (2ND FLR);  Service: Colon and Rectal;  Laterality: N/A;    COLONOSCOPY N/A 11/3/2023    Procedure: COLONOSCOPY;  Surgeon: EDILMA Bonilla MD;  Location: [facility] ENDO (4TH FLR);  Service: Endoscopy;  Laterality: N/A;  prep instructions sent to pt via portal  From: EDILMA Bonilla MD  Procedure: Colonoscopy  Diagnosis: Surveillance colonoscopy - Hx of colon cancer  Procedure Timin-12 weeks  Provider: Myself  Location: [address]  Additional Scheduling Information: No scheduling con    COLONOSCOPY N/A 7/10/2024    Procedure: COLONOSCOPY;  Surgeon: Tam Pantoja MD;  Location: [facility] ENDO (2ND FLR);  Service: Endoscopy;  Laterality: N/A;    DIAGNOSTIC LAPAROSCOPY N/A 2022    Procedure: LAPAROSCOPY, DIAGNOSTIC;  Surgeon: Adrian Rodriguez MD;  [facility];  Service: General;  Laterality: N/A;    INSERTION OF TUNNELED CENTRAL VENOUS CATHETER (CVC) WITH SUBCUTANEOUS PORT N/A 5/3/2021    Procedure: [id number];  Surgeon: Francisco Layton MD;  Location: [facility] OR 2ND FLR;  Service: Vascular;  Laterality: N/A;    OMENTECTOMY N/A 10/20/2022    Procedure: OMENTECTOMY;  Surgeon: EDILMA Bonilla MD;  [facility];  Service: Colon and Rectal;  Laterality: N/A;    RIGHT HEMICOLECTOMY N/A 10/20/2022    Procedure: HEMICOLECTOMY, RIGHT, extended;  Surgeon: EDILMA Bonilla MD;  [facility];   "Service: Colon and Rectal;  Laterality: N/A;  Extended right hemicolectomy CONSENT IN AM       Review of Systems:   Negative    Home Meds:   Prior to Admission medications    Medication Sig Start Date End Date Taking? Authorizing Provider   amLODIPine (NORVASC) 10 MG tablet Take 1 tablet (10 mg total) by mouth once daily. 9/16/24  Yes Natividad Maher, CNS   oxyCODONE (ROXICODONE) 5 MG immediate release tablet Take 1 tablet (5 mg total) by mouth every 6 (six) hours as needed for Pain. 11/2/22  Yes Elsy Kelly NP   tamsulosin (FLOMAX) 0.4 mg Cap Take 1 capsule (0.4 mg total) by mouth once daily. 10/16/24 10/16/25 Yes Bunny Schuster MD   traMADoL (ULTRAM) 50 mg tablet Take 1 tablet (50 mg total) by mouth every 6 (six) hours as needed for Pain. 9/16/24  Yes Natividad Maher CNS   acetaminophen (TYLENOL) 500 MG tablet Take 1 tablet (500 mg total) by mouth every 6 (six) hours as needed for Pain (alternate with ibuprofen). 5/3/21   FRANKLIN Delarosa MD   LIDOcaine-prilocaine (EMLA) cream Apply topically as needed (port application). 4/15/24   Anali Hernandez, PAAidenC   ondansetron (ZOFRAN) 4 MG tablet Take 1 tablet (4 mg total) by mouth every 8 (eight) hours as needed for Nausea. 6/8/23   Thomas Monroe MD   pantoprazole (PROTONIX) 40 MG tablet Take 1 tablet (40 mg total) by mouth 2 (two) times daily before meals. 6/26/24 6/26/25  Max Grijalva MD     Scheduled Meds:   Continuous Infusions:    0.9% NaCl   Intravenous Continuous         PRN Meds:  Anticoagulants/Antiplatelets: no anticoagulation    Allergies: Review of patient's allergies indicates:  No Known Allergies  Sedation Hx: have not been any systemic reactions    Labs:  No results for input(s): "INR", "PT", "PTT" in the last 168 hours.  No results for input(s): "WBC", "HGB", "HCT", "MCV", "PLT" in the last 168 hours. No results for input(s): "GLU", "NA", "K", "CL", "CO2", "BUN", "CREATININE", "CALCIUM", "MG", "ALT", "AST", "ALBUMIN", " ""BILITOT", "BILIDIR" in the last 168 hours.      Vitals:  Temp: 97.9 °F (36.6 °C) (11/21/24 1210)  Pulse: 75 (11/21/24 1215)  Resp: 20 (11/21/24 1210)  BP: 101/64 (11/21/24 1210)  SpO2: 100 % (11/21/24 1210)     Physical Exam:  ASA: 2  Mallampati: 2    General: no acute distress  Mental Status: alert and oriented to person, place and time  HEENT: normocephalic, atraumatic  Chest: unlabored breathing  Heart: regular heart rate  Abdomen: nondistended  Extremity: moves all extremities    Plan: y90 radioembolization treatment  Sedation Plan: up to moderate      Cyrus Art MD  PGY-3  Diagnostic Radiology      "

## 2024-11-21 NOTE — DISCHARGE SUMMARY
Radiology Discharge Summary      Hospital Course: No complications    Admit Date: 11/21/2024  Discharge Date: 11/21/2024     Instructions Given to Patient: Yes  Diet: Resume prior diet  Activity: activity as tolerated and no driving for today    Description of Condition on Discharge: Stable  Vital Signs (Most Recent): Temp: 97.9 °F (36.6 °C) (11/21/24 1210)  Pulse: 78 (11/21/24 1458)  Resp: 17 (11/21/24 1458)  BP: 98/63 (11/21/24 1458)  SpO2: 100 % (11/21/24 1458)    Discharge Disposition: Home    Discharge Diagnosis: mCRC s/p Y90 delivery    Follow up: As scheduled    Raul Vidal M.D.  Interventional Radiology  Department of Radiology

## 2024-11-21 NOTE — DISCHARGE INSTRUCTIONS
Post-Yttrium (Y90) instructions:    Dont drive for 3 days.  Avoid walking, bending, and taking stairs for 24 hours.  No heavy lifting anything 10 lbs or heavier (gallon of milk) or strenuous exercise for 10 days.  Keep small children, pregnant women, and pets 3 feet away for 3 days.  For international flights, the radiation may set off the security scans.   Keep your dressing clean and dry for 24 hours. Do not submerge insertion site in water (bath tub, swimming pool) until fully healed.  Be sure to follow any other instructions from your doctor.      Call your doctor if you have any of the following:    Fever of 100.4 (38C) or higher lasting for 24 to 48 hours  Bleeding, swelling, or a large lump at the insertion site  Sharp or increasing pain at the insertion site  Leg pain, numbness, or a cold leg or foot  Any other symptoms your provider instructed you to report based on your medical condition.    Please call with any questions or concerns.      Monday thru Friday 8:00 am - 4:30 pm    Interventional Radiology   [phone]    After Hours    Ask for the Interventional Radiology Physician on call  [phone]        Y 90 Delivery Discharge Instructions    Monitor the groin site for bleeding. Apply firm pressure to the groin site if you need to cough, during sneezes, and in the event you have to vomit. This reduces the risk of your site bleeding. If bleeding does occurs, apply firm, manual pressure and seek medical assistance immediately!  Do not shower/bathe tonight. Shower tomorrow, at least 24 hours post-procedure. After your shower, you may remove the groin dressing.   Carefully cleanse the groin site with gentle soap and water; do not pick at any scab formation.  Monitor the groin site for signs and symptoms of infection (See below).  Stay three feet away from people, especially pregnant people, small pets, and small children for three days.  Sleep alone for the next three days.  You may use the  restroom as other members of your home; your waste is not radioactive.    Recovering at Home:    Follow your doctor's recommendations on when it is safe to drive - at least THREE days after your procedure.  Do not lift anything heavier than a gallon of milk for the next TEN days.  Avoid strenuous activity/exercise for the next TEN days - this includes climbing stairs.   Do not submerge in a bathtub, pool, or Jacuzzi until the groin site is fully closed - at least 14 days.  Rest often. You may be more tired than usual.  Take your medications exactly as directed. Do not skip doses. Note - some medications should not be resumed after this procedure to prevent any adverse interaction with the contrast dye, which may be harmful to your kidneys. Be sure to ask your healthcare team about any potential reactions and for guidance on what medications to hold.  Unless directed otherwise, drink six to eight glasses of water a day for the next TWO days to prevent dehydration and to help flush your body of the contrast dye used during your procedure.  Take your temperature and check the groin site for signs of infection - redness, swelling, drainage, or warmth - every day for one week.    When to call your doctor:    Fever of 100.4°F (38°C) or higher, or as directed by your health care provider.  Sudden shortness breath or any bleeding, bruising, or a large area of swelling at the groin site.  Signs of an allergic reaction to the contrast dye: rash, nausea, vomiting, or trouble breathing.  Signs of infection at the groin site: increased pain, redness, swelling, warmth, or foul-smelling drainage.   Constant or increasing pain or numbness in your leg.  Your leg feels cold, looks blue; numbness and/or tingling in the leg, especially near the catheter insertion site.   Rapid, pounding, or irregular heartbeat.  Blood in your urine or black, tarry stools.    Interventional Radiology Clinic    [phone], choose prompt 3, Monday -  Friday, 8:00 am - 4:00 pm    [phone] After hours and on holidays. Ask to speak with the interventional radiologist on call.

## 2024-11-21 NOTE — PLAN OF CARE
Y-90 delivery procedure completed. Patient tolerated well. Patient AAOx3, no distress noted, respirations even and unlabored, VSS. Vascade closure device deployed in right femoral artery; hemostasis achieved at 1510. Patient to lay flat for 2 hours until 1710 time on 11/21 date per MD. Right groin site clean, dry, and intact; no bleeding or hematoma noted. Patient to be transferred to MPU after nuclear med scan for post-procedural recovery per MD. Report to be given at bedside to RN.

## 2024-11-21 NOTE — PROCEDURES
Radiology Post-Procedure Note    Pre Op Diagnosis: mCRC    Post Op Diagnosis: Same    Procedure: Y90 delivery    Procedure performed by: Raul Vidal MD    Written Informed Consent Obtained: Yes  Specimen Removed: NO  Estimated Blood Loss: Minimal    Findings:   Via rt cfa, celiac, rha and branches selected and angiograms obtained. Tumor feeders identified. Y90 administered to post  div rha and post branch of ant div rha per WD. Vascade to rt cfa. No complications. See dictation.    Patient tolerated procedure well.    Raul Vidal M.D.  Interventional Radiology  Department of Radiology

## 2024-11-22 DIAGNOSIS — C78.7 METASTATIC COLON CANCER TO LIVER: Primary | ICD-10-CM

## 2024-11-22 DIAGNOSIS — C18.9 METASTATIC COLON CANCER TO LIVER: Primary | ICD-10-CM

## 2024-11-25 ENCOUNTER — TELEPHONE (OUTPATIENT)
Dept: INTERVENTIONAL RADIOLOGY/VASCULAR | Facility: CLINIC | Age: 74
End: 2024-11-25
Payer: MEDICARE

## 2024-11-29 ENCOUNTER — OFFICE VISIT (OUTPATIENT)
Dept: HEMATOLOGY/ONCOLOGY | Facility: CLINIC | Age: 74
End: 2024-11-29
Payer: MEDICARE

## 2024-11-29 ENCOUNTER — LAB VISIT (OUTPATIENT)
Dept: LAB | Facility: HOSPITAL | Age: 74
End: 2024-11-29
Payer: MEDICARE

## 2024-11-29 ENCOUNTER — INFUSION (OUTPATIENT)
Dept: INFUSION THERAPY | Facility: HOSPITAL | Age: 74
End: 2024-11-29
Payer: MEDICARE

## 2024-11-29 VITALS
HEIGHT: 67 IN | DIASTOLIC BLOOD PRESSURE: 62 MMHG | TEMPERATURE: 98 F | OXYGEN SATURATION: 95 % | HEART RATE: 114 BPM | WEIGHT: 119.06 LBS | SYSTOLIC BLOOD PRESSURE: 98 MMHG | RESPIRATION RATE: 15 BRPM | BODY MASS INDEX: 18.69 KG/M2

## 2024-11-29 VITALS
RESPIRATION RATE: 16 BRPM | DIASTOLIC BLOOD PRESSURE: 68 MMHG | OXYGEN SATURATION: 98 % | HEART RATE: 91 BPM | TEMPERATURE: 98 F | SYSTOLIC BLOOD PRESSURE: 120 MMHG

## 2024-11-29 DIAGNOSIS — R52 PAIN: ICD-10-CM

## 2024-11-29 DIAGNOSIS — C18.9 METASTATIC COLON CANCER TO LIVER: ICD-10-CM

## 2024-11-29 DIAGNOSIS — Z79.899 IMMUNODEFICIENCY DUE TO CHEMOTHERAPY: ICD-10-CM

## 2024-11-29 DIAGNOSIS — K92.2 LOWER GI BLEED: ICD-10-CM

## 2024-11-29 DIAGNOSIS — C18.9 METASTATIC COLON CANCER TO LIVER: Primary | ICD-10-CM

## 2024-11-29 DIAGNOSIS — N17.9 AKI (ACUTE KIDNEY INJURY): Primary | ICD-10-CM

## 2024-11-29 DIAGNOSIS — I10 HYPERTENSION, UNSPECIFIED TYPE: ICD-10-CM

## 2024-11-29 DIAGNOSIS — E86.0 DEHYDRATION: ICD-10-CM

## 2024-11-29 DIAGNOSIS — D84.81 IMMUNODEFICIENCY SECONDARY TO NEOPLASM: ICD-10-CM

## 2024-11-29 DIAGNOSIS — T45.1X5A ANEMIA ASSOCIATED WITH CHEMOTHERAPY: ICD-10-CM

## 2024-11-29 DIAGNOSIS — K59.03 DRUG-INDUCED CONSTIPATION: ICD-10-CM

## 2024-11-29 DIAGNOSIS — R39.9 LOWER URINARY TRACT SYMPTOMS (LUTS): ICD-10-CM

## 2024-11-29 DIAGNOSIS — D64.81 ANEMIA ASSOCIATED WITH CHEMOTHERAPY: ICD-10-CM

## 2024-11-29 DIAGNOSIS — T45.1X5A CHEMOTHERAPY INDUCED NEUTROPENIA: ICD-10-CM

## 2024-11-29 DIAGNOSIS — D84.821 IMMUNODEFICIENCY DUE TO CHEMOTHERAPY: ICD-10-CM

## 2024-11-29 DIAGNOSIS — E43 SEVERE MALNUTRITION: ICD-10-CM

## 2024-11-29 DIAGNOSIS — C78.7 METASTATIC COLON CANCER TO LIVER: Primary | ICD-10-CM

## 2024-11-29 DIAGNOSIS — D49.9 IMMUNODEFICIENCY SECONDARY TO NEOPLASM: ICD-10-CM

## 2024-11-29 DIAGNOSIS — C78.7 METASTATIC COLON CANCER TO LIVER: ICD-10-CM

## 2024-11-29 DIAGNOSIS — T45.1X5A IMMUNODEFICIENCY DUE TO CHEMOTHERAPY: ICD-10-CM

## 2024-11-29 DIAGNOSIS — D70.1 CHEMOTHERAPY INDUCED NEUTROPENIA: ICD-10-CM

## 2024-11-29 LAB
ALBUMIN SERPL BCP-MCNC: 2.4 G/DL (ref 3.5–5.2)
ALP SERPL-CCNC: 185 U/L (ref 40–150)
ALT SERPL W/O P-5'-P-CCNC: 26 U/L (ref 10–44)
ANION GAP SERPL CALC-SCNC: 12 MMOL/L (ref 8–16)
ANISOCYTOSIS BLD QL SMEAR: SLIGHT
AST SERPL-CCNC: 65 U/L (ref 10–40)
BASOPHILS # BLD AUTO: 0.06 K/UL (ref 0–0.2)
BASOPHILS NFR BLD: 0.3 % (ref 0–1.9)
BILIRUB SERPL-MCNC: 2.1 MG/DL (ref 0.1–1)
BUN SERPL-MCNC: 11 MG/DL (ref 8–23)
CALCIUM SERPL-MCNC: 8.7 MG/DL (ref 8.7–10.5)
CEA SERPL-MCNC: 102 NG/ML (ref 0–5)
CHLORIDE SERPL-SCNC: 103 MMOL/L (ref 95–110)
CO2 SERPL-SCNC: 21 MMOL/L (ref 23–29)
CREAT SERPL-MCNC: 0.9 MG/DL (ref 0.5–1.4)
DIFFERENTIAL METHOD BLD: ABNORMAL
EOSINOPHIL # BLD AUTO: 0 K/UL (ref 0–0.5)
EOSINOPHIL NFR BLD: 0.1 % (ref 0–8)
ERYTHROCYTE [DISTWIDTH] IN BLOOD BY AUTOMATED COUNT: 22.9 % (ref 11.5–14.5)
EST. GFR  (NO RACE VARIABLE): >60 ML/MIN/1.73 M^2
GLUCOSE SERPL-MCNC: 92 MG/DL (ref 70–110)
HCT VFR BLD AUTO: 30.1 % (ref 40–54)
HGB BLD-MCNC: 9.2 G/DL (ref 14–18)
IMM GRANULOCYTES # BLD AUTO: 0.31 K/UL (ref 0–0.04)
IMM GRANULOCYTES NFR BLD AUTO: 1.6 % (ref 0–0.5)
LYMPHOCYTES # BLD AUTO: 0.4 K/UL (ref 1–4.8)
LYMPHOCYTES NFR BLD: 2.1 % (ref 18–48)
MCH RBC QN AUTO: 26.2 PG (ref 27–31)
MCHC RBC AUTO-ENTMCNC: 30.6 G/DL (ref 32–36)
MCV RBC AUTO: 86 FL (ref 82–98)
MONOCYTES # BLD AUTO: 2.5 K/UL (ref 0.3–1)
MONOCYTES NFR BLD: 12.9 % (ref 4–15)
NEUTROPHILS # BLD AUTO: 16.3 K/UL (ref 1.8–7.7)
NEUTROPHILS NFR BLD: 83 % (ref 38–73)
NRBC BLD-RTO: 0 /100 WBC
OVALOCYTES BLD QL SMEAR: ABNORMAL
PLATELET # BLD AUTO: 160 K/UL (ref 150–450)
PMV BLD AUTO: 11.7 FL (ref 9.2–12.9)
POIKILOCYTOSIS BLD QL SMEAR: SLIGHT
POLYCHROMASIA BLD QL SMEAR: ABNORMAL
POTASSIUM SERPL-SCNC: 4.5 MMOL/L (ref 3.5–5.1)
PROT SERPL-MCNC: 6.4 G/DL (ref 6–8.4)
RBC # BLD AUTO: 3.51 M/UL (ref 4.6–6.2)
SODIUM SERPL-SCNC: 136 MMOL/L (ref 136–145)
WBC # BLD AUTO: 19.63 K/UL (ref 3.9–12.7)

## 2024-11-29 PROCEDURE — 80053 COMPREHEN METABOLIC PANEL: CPT | Performed by: PHYSICIAN ASSISTANT

## 2024-11-29 PROCEDURE — 96360 HYDRATION IV INFUSION INIT: CPT

## 2024-11-29 PROCEDURE — 63600175 PHARM REV CODE 636 W HCPCS: Performed by: PHYSICIAN ASSISTANT

## 2024-11-29 PROCEDURE — A4216 STERILE WATER/SALINE, 10 ML: HCPCS | Performed by: PHYSICIAN ASSISTANT

## 2024-11-29 PROCEDURE — 82378 CARCINOEMBRYONIC ANTIGEN: CPT | Performed by: PHYSICIAN ASSISTANT

## 2024-11-29 PROCEDURE — 36415 COLL VENOUS BLD VENIPUNCTURE: CPT | Performed by: PHYSICIAN ASSISTANT

## 2024-11-29 PROCEDURE — 85025 COMPLETE CBC W/AUTO DIFF WBC: CPT | Performed by: PHYSICIAN ASSISTANT

## 2024-11-29 PROCEDURE — 25000003 PHARM REV CODE 250: Performed by: PHYSICIAN ASSISTANT

## 2024-11-29 PROCEDURE — 99999 PR PBB SHADOW E&M-EST. PATIENT-LVL IV: CPT | Mod: PBBFAC,,, | Performed by: PHYSICIAN ASSISTANT

## 2024-11-29 RX ORDER — TAMSULOSIN HYDROCHLORIDE 0.4 MG/1
0.4 CAPSULE ORAL DAILY
Qty: 30 CAPSULE | Refills: 5 | Status: SHIPPED | OUTPATIENT
Start: 2024-11-29 | End: 2025-11-29

## 2024-11-29 RX ORDER — SODIUM CHLORIDE 0.9 % (FLUSH) 0.9 %
10 SYRINGE (ML) INJECTION
Status: DISCONTINUED | OUTPATIENT
Start: 2024-11-29 | End: 2024-11-29 | Stop reason: HOSPADM

## 2024-11-29 RX ORDER — AMLODIPINE BESYLATE 10 MG/1
10 TABLET ORAL DAILY
Qty: 90 TABLET | Refills: 3 | Status: SHIPPED | OUTPATIENT
Start: 2024-11-29

## 2024-11-29 RX ORDER — HEPARIN 100 UNIT/ML
500 SYRINGE INTRAVENOUS
Status: DISCONTINUED | OUTPATIENT
Start: 2024-11-29 | End: 2024-11-29 | Stop reason: HOSPADM

## 2024-11-29 RX ADMIN — SODIUM CHLORIDE 1000 ML: 9 INJECTION, SOLUTION INTRAVENOUS at 02:11

## 2024-11-29 RX ADMIN — HEPARIN 500 UNITS: 100 SYRINGE at 04:11

## 2024-11-29 RX ADMIN — Medication 10 ML: at 04:11

## 2024-11-29 NOTE — PLAN OF CARE
Pt received 1L NS today and tolerated well, without complications. Educated patient about 1L NS (indications, side effects, possible reactions, precautions) and verbalized understanding.  VSS. CW port positive for blood return, saline flushed, Heparin flush instilled to dwell and de accessed prior to DC. Pt DC with no distress noted, ambulated off unit, w/o event, w/ fx, pleased. States feels better after IVFs.

## 2024-11-29 NOTE — PROGRESS NOTES
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 Bonilla  12/30/2022: Y-90 to R liver  1/27/2023: Y-90 to R liver  5/17/2023: Y-90 to R liver  7/11/2023: C1 FOLFIRI + Pema  7/26/2023: C2 FOLFIRI + Pema  8/8/2023: C3 FOLFIRI + Pema  8/22/23: C4 FOLFIRI + Pema  Restaging CT CAP 9/1/23: Good response to chemo.  9/7/23: C5 FOLFIRI + Pema  ......................................  Restaging CT CAP 10/12/23: Good response to chemo  10/16/23: C8 FOLFIRI + Pema  .......................................  Restaging CT CAP 12/8/23: Good response to chemo  12/11/23: C12 FOLFIRI + Pema  .......................................  Restaging CT CAP 2/5/24: Good response to chemo  2/8/24: C16 FOLFIRI + Pema    Restaging CT CAP 4/26/24: Good response to chemo  Restaging CT CAP 6/6/24: Good response to chemo  Restaging CT CAP 8/1/24: Stable disease  Restaging CT CAP 10/11/24: Progression of R liver lobe mass.  Plan for Y-90 to this.      Interval History:   He presents today with his daughter prior to cycle 35 of FOLFIRI (off Avastin due to GIB). He is not feeling too well. He reports having increased fatigue, shortness of breath. This worsens after exertion as well. He denies fever, chills, vomiting, cough, chest pain or constipation. He has not fallen. He reports having diarrhea as well,  and is not taking any Imodium or medication for this. Underwent Y90 mapping on 24.  Denies any further pain (was having some lower back pain).  Denies any blood in his stool. He is eating and his appetite is fair.     ECOG status is 1. Presents with his daughter today.    Past Medical History:   Past Medical History:   Diagnosis Date    MARIA A (acute kidney injury) 2022    Hypertension     Metastatic colon cancer to liver 2021     Past Surgical HIstory:   Past Surgical History:   Procedure Laterality Date    COLONOSCOPY N/A 2021    Procedure: COLONOSCOPY with possible stent;  Surgeon: EDILMA Bonilla MD;  Location: [facility] ENDO (2ND FLR);  Service: Colon and Rectal;  Laterality: N/A;    COLONOSCOPY N/A 11/3/2023    Procedure: COLONOSCOPY;  Surgeon: EDILMA Bonilla MD;  Location: [facility] ENDO (4TH FLR);  Service: Endoscopy;  Laterality: N/A;  prep instructions sent to pt via portal  From: EDILMA Bonilla MD  Procedure: Colonoscopy  Diagnosis: Surveillance colonoscopy - Hx of colon cancer  Procedure Timin-12 weeks  Provider: Myself  Location: [facility] 4-Endo  Additional Scheduling Information: No scheduling con    COLONOSCOPY N/A 7/10/2024    Procedure: COLONOSCOPY;  Surgeon: Tam Pantoja MD;  Location: [facility] (2ND FLR);  Service: Endoscopy;  Laterality: N/A;    DIAGNOSTIC LAPAROSCOPY N/A 2022    Procedure: LAPAROSCOPY, DIAGNOSTIC;  Surgeon: Adrian Rodriguez MD;  [facility];  Service: General;  Laterality: N/A;    INSERTION OF TUNNELED CENTRAL VENOUS CATHETER (CVC) WITH SUBCUTANEOUS PORT N/A 5/3/2021    Procedure: [id number];  Surgeon: Francisco Layton MD;  Location: [facility] OR 2ND FLR;  Service: Vascular;  Laterality: N/A;    OMENTECTOMY N/A 10/20/2022    Procedure: OMENTECTOMY;  Surgeon: EDILMA Bonilla MD;  [facility];  Service: Colon and Rectal;  Laterality: N/A;    RIGHT HEMICOLECTOMY N/A 10/20/2022    Procedure: HEMICOLECTOMY, RIGHT, extended;  Surgeon:  EDILMA Bonilla MD;  [facility]: [facility] OR [address] [place] [address];  Service: Colon and Rectal;  Laterality: N/A;  Extended right hemicolectomy CONSENT IN AM     Family History:   Family History   Problem Relation Name Age of Onset    Cancer Brother         Social History:  reports that he has never smoked. He has never used smokeless tobacco. He reports that he does not currently use alcohol. He reports that he does not use drugs.    Allergies:  Review of patient's allergies indicates:  No Known Allergies    Medications:  Current Outpatient Medications   Medication Sig Dispense Refill    acetaminophen (TYLENOL) 500 MG tablet Take 1 tablet (500 mg total) by mouth every 6 (six) hours as needed for Pain (alternate with ibuprofen).  0    amLODIPine (NORVASC) 10 MG tablet Take 1 tablet (10 mg total) by mouth once daily. 90 tablet 3    LIDOcaine-prilocaine (EMLA) cream Apply topically as needed (port application). 30 g 3    ondansetron (ZOFRAN) 4 MG tablet Take 1 tablet (4 mg total) by mouth every 8 (eight) hours as needed for Nausea. 30 tablet 3    oxyCODONE (ROXICODONE) 5 MG immediate release tablet Take 1 tablet (5 mg total) by mouth every 6 (six) hours as needed for Pain. 20 tablet 0    pantoprazole (PROTONIX) 40 MG tablet Take 1 tablet (40 mg total) by mouth 2 (two) times daily before meals. 180 tablet 3    tamsulosin (FLOMAX) 0.4 mg Cap Take 1 capsule (0.4 mg total) by mouth once daily. 30 capsule 5    traMADoL (ULTRAM) 50 mg tablet Take 1 tablet (50 mg total) by mouth every 6 (six) hours as needed for Pain. 30 tablet 0     No current facility-administered medications for this visit.     Review of Systems   Constitutional:  Negative for activity change and appetite change.   Eyes:  Negative for visual disturbance.   Respiratory:  Negative for cough and shortness of breath.    Cardiovascular:  Negative for chest pain and leg swelling.   Gastrointestinal:  Positive for abdominal pain. Negative for abdominal distention, blood in stool,  "constipation, diarrhea, nausea and vomiting.   Musculoskeletal:  Positive for back pain. Negative for arthralgias.   Skin:  Negative for wound.   Neurological:  Negative for dizziness, weakness and numbness.   Psychiatric/Behavioral:  Negative for confusion and sleep disturbance. The patient is not nervous/anxious.    All other systems reviewed and are negative.    ECOG Performance Status: 1     Objective:      Vitals:   Vitals:    11/29/24 1313   BP: 98/62   BP Location: Left arm   Patient Position: Sitting   Pulse: (!) 114   Resp: 15   Temp: 97.5 °F (36.4 °C)   TempSrc: Oral   SpO2: 95%   Weight: 54 kg (119 lb 0.8 oz)   Height: 5' 7" (1.702 m)     Physical Exam  Vitals reviewed.   Constitutional:       General: He is not in acute distress.     Appearance: Normal appearance. He is not ill-appearing, toxic-appearing or diaphoretic.   HENT:      Head: Normocephalic and atraumatic.      Right Ear: External ear normal.      Left Ear: External ear normal.      Nose: Nose normal.      Mouth/Throat:      Pharynx: Oropharynx is clear.   Eyes:      General: No scleral icterus.     Extraocular Movements: Extraocular movements intact.      Conjunctiva/sclera: Conjunctivae normal.      Pupils: Pupils are equal, round, and reactive to light.   Cardiovascular:      Rate and Rhythm: Normal rate and regular rhythm.      Pulses: Normal pulses.      Heart sounds: Normal heart sounds. No murmur heard.  Pulmonary:      Effort: Pulmonary effort is normal. No respiratory distress.      Breath sounds: Normal breath sounds. No wheezing.   Chest:      Comments: Port to RCW, no signs of infection.  Abdominal:      General: Abdomen is flat. Bowel sounds are normal. There is no distension.      Palpations: Abdomen is soft. There is no mass.      Tenderness: There is no abdominal tenderness.      Comments: Vertical midline abdominal wound well healed   Musculoskeletal:         General: No swelling or deformity. Normal range of motion.      " Right lower leg: No edema.      Left lower leg: No edema.   Skin:     Coloration: Skin is not jaundiced or pale.      Findings: No bruising, erythema or rash.   Neurological:      General: No focal deficit present.      Mental Status: He is alert and oriented to person, place, and time. Mental status is at baseline.      Cranial Nerves: No cranial nerve deficit.      Sensory: No sensory deficit.      Gait: Gait normal.   Psychiatric:         Mood and Affect: Mood normal.         Behavior: Behavior normal.         Thought Content: Thought content normal.         Judgment: Judgment normal.     Laboratory Data:  Lab Visit on 11/29/2024   Component Date Value Ref Range Status    WBC 11/29/2024 19.63 (H)  3.90 - 12.70 K/uL Final    RBC 11/29/2024 3.51 (L)  4.60 - 6.20 M/uL Final    Hemoglobin 11/29/2024 9.2 (L)  14.0 - 18.0 g/dL Final    Hematocrit 11/29/2024 30.1 (L)  40.0 - 54.0 % Final    MCV 11/29/2024 86  82 - 98 fL Final    MCH 11/29/2024 26.2 (L)  27.0 - 31.0 pg Final    MCHC 11/29/2024 30.6 (L)  32.0 - 36.0 g/dL Final    RDW 11/29/2024 22.9 (H)  11.5 - 14.5 % Final    Platelets 11/29/2024 160  150 - 450 K/uL Final    MPV 11/29/2024 11.7  9.2 - 12.9 fL Final    Sodium 11/29/2024 136  136 - 145 mmol/L Final    Potassium 11/29/2024 4.5  3.5 - 5.1 mmol/L Final    Chloride 11/29/2024 103  95 - 110 mmol/L Final    CO2 11/29/2024 21 (L)  23 - 29 mmol/L Final    Glucose 11/29/2024 92  70 - 110 mg/dL Final    BUN 11/29/2024 11  8 - 23 mg/dL Final    Creatinine 11/29/2024 0.9  0.5 - 1.4 mg/dL Final    Calcium 11/29/2024 8.7  8.7 - 10.5 mg/dL Final    Total Protein 11/29/2024 6.4  6.0 - 8.4 g/dL Final    Albumin 11/29/2024 2.4 (L)  3.5 - 5.2 g/dL Final    Total Bilirubin 11/29/2024 2.1 (H)  0.1 - 1.0 mg/dL Final    Comment: For infants and newborns, interpretation of results should be based  on gestational age, weight and in agreement with clinical  observations.    Premature Infant recommended reference ranges:  Up to 24  hours.............<8.0 mg/dL  Up to 48 hours............<12.0 mg/dL  3-5 days..................<15.0 mg/dL  6-29 days.................<15.0 mg/dL      Alkaline Phosphatase 11/29/2024 185 (H)  40 - 150 U/L Final    AST 11/29/2024 65 (H)  10 - 40 U/L Final    ALT 11/29/2024 26  10 - 44 U/L Final    eGFR 11/29/2024 >60.0  >60 mL/min/1.73 m^2 Final    Anion Gap 11/29/2024 12  8 - 16 mmol/L Final     Assessment and Plan        1. Metastatic colon cancer to liver    2. Immunodeficiency secondary to neoplasm    3. Immunodeficiency due to chemotherapy    4. Chemotherapy induced neutropenia    5. Lower GI bleed    6. Anemia associated with chemotherapy    7. Pain    8. Drug-induced constipation    9. Severe malnutrition    10. Hypertension, unspecified type    11. Lower urinary tract symptoms (LUTS)    12. Dehydration      1-6.  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  Guardant 360 was negative for BRAF, VIRI, HER2 amplification.   Pretreatment CEA 57.  We had a long and sonia discussion with him about his diagnosis. Unfortunately, the disease is not curable but remains treatable and he has good performance status.   Restaging scans after 7 cycles of FOLFOX + Pema showed significant reduction in colonic mass and liver mass.   we switched to maintenance as of cycle 8 with 5FU + Pema  Restaging scans from March 2022 show stable disease.   MRI on 7/18/22 showing hepatic progression of disease in the right hepatic lobe noted on the exam. CT CAP on 8/26 shows some mild progression in both liver metastases.    Discussed case at colorectal tumor board 8/31/22 and had a diagnostic lap performed by Dr. Rodriguez on 9/7 to assess for any occult peritoneal disease. Findings from the diagnostic lap were negative. Fluid from around from around the primary mass was sent for cytology that was negative for malignancy.   Underwent primary tumor resection on 10/20/22 with Dr. Bonilla.    Meanwhile his liver  mets had grown.  We discussed his case at [facility] conference with plans for short term Y-90 and then restarting chemotherapy prior to considering any surgical options to his liver metastases.  He underwent Y-90 delivery on 12/30/22. Tolerated well.   CT CAP on 1/23/23 reviewed at [facility] conference with good response to Y-90, no new lesions.  Underwent second Y-90 on 1/27/23. Can consider R hepatectomy pending follow-up imaging after Y-90.     Received cycle 42 of FOLFOX (omitted oxaliplatin again starting cycle 41 due to neuropathy) on 2/9/23.  Because of 5-FU shortage nationally, we have been holding chemotherapy since mid-February 2023.  If he needs to restart chemotherapy (I.e. no plans for surgical resection), will place him on capecitabine maintenance for duration of 5-FU shortage.     Discussed at colorectal liver mets tumor board 3/16/23.  Plan for repeat Y-90 and then consideration of surgical resection and he will meet with liver transplant team as well.  Underwent Y-90 delivery 5/17/23 with IR.     Has been on maintenance Xeloda since late April 2023.    Met with liver transplant team but ultimately opted not to proceed with transplant as he was concerned about how he would tolerate a major surgery with the life changes that would come after transplant as well. They closed out his case.    He met with Dr. Rodriguez to discuss whether surgical resection is indicated for his liver mets.  He recommended repeat MRI.  Repeat MRI unfortunately showed disease progression while on Xeloda maintenance.  Similarly his CEA garrett precipitously, all in keeping with worsening disease.    We recommended we restart IV chemotherapy with FOLFIRI + Avastin (never had irinotecan).    Because of hyperbilirubinemia he received cycle 1 with just 5-FU + Avastin on 7/11/23. Tolerated this well. Bilirubin has improved.  Received irinotecan starting with cycle 2.  Repeat CT CAP after cycle 4 shows good response to therapy.   Excellent  tolerance. [facility] tumor board agrees with continuation of chemotherapy.   Repeat CT CAP after cycle 7 shows stable disease, no evidence of new or worsening disease.   Repeat CT CAP after cycle 11 shows stable disease.   Repeat CT CAP after cycle 15 shows improved disease.  Repeat CT CAP after cycle 21 shows stable disease.   Repeat CT CAP after cycle 24 shows stable disease.    Hospitalized after cycle 26 and 27 (without Avastin) for hematochezia.  Colonoscopy with likely diverticular bleed.  Required 2 units of blood in last month.  Will continue to hold bevacizumab indefinitely.  Discussed that we don't have a very good way preventing this recurrence and chemo with its associated thrombocytopenia may make this more likely to occur again.  He still wishes to proceed, which is my recommendation as well.    Repeat CT CAP after cycle 28 shows stable disease and recc to continue with chemotherapy.   Repeat CT CAP after cycle 32 shows progression of R liver lobe metastasis with rising CEA.  Will plan for Y-90 to growing lesion.  Underwent mapping 11/5/24.    Will continue FOLFIRI in the meantime.  Delayed cycle 33 due to neutropenia - has since recovered.    - He is not feeling too well. He reports having increased fatigue, shortness of breath. This worsens after exertion as well. He denies fever, chills, vomiting, cough, chest pain or constipation. He has not fallen. He reports having diarrhea as well, and is not taking any Imodium or medication for this. Underwent Y90 mapping on 11/5/24.  Denies any further pain (was having some lower back pain).  Denies any blood in his stool. He is eating and his appetite is fair.     - I am concerned that his symptoms are due to dehydration and have advised him to received IVFs today. Encouraged him to drink at least 64 oz of water as well. Given that he is having diarrhea, he is presenting with symptoms of low volume. Advised him to take his Imodium as well.   - I have reviewed the  CBC and CMP, adequate for treatment on Monday  - Bili 2.1. No signs of jaundice at this time. No fever. ALT normal. 2/2 Gilbert's syndrome, as he is a poor metabolizer of UGT1A1  - Will give IVFs today, 1L over an hour  - Proceed with cycle 35 of FOLFIRI on Monday + OBI Neulasta.    RTC in 2 weeks.    Tempus xT: APC, CKS1B cng, ERCC3 cnl, PIK3CA; KENIA, TMB 12.1.  Tempus xF: APC, KRAS Q61H, PIK3CA, TP53; KENIA    7-9. Neoplasm related pain, weight loss, constipation, malnutrition  -- Pain very well controlled. Has oxycodone 5mg to use PRN but not requiring now.  -- Following with nutrition. Encouraged increased protein. Monitor.  Weight down slightly but reports good appetite.  -- Continue Miralax daily for constipation. Doing well with this.     10. Hypertension   -- BP low normal today. Asymptomatic.   -- Following with PCP.   -- encouraged him and his daughter to monitor BP and HR closely at home.     11,12. Urinary Hesitancy/dehydration  -- Refilled Flomax.   -- Reported improvement since starting medication.     - Low BP with tachycardia, suspected from dehydration. Hb improved. Will order IVFs, 1L over an hour today.   - Pt advised to increase oral hydration, at least 64 oz of water daily    RTC in 2 weeks    Pte and family members displayed understanding of the above encounter and treatment plan. All thoughtful questions were answered to their satisfaction. Pte was advised to notify the care team or proceed to the ER if signs and symptoms worsen.     Visit today included increased complexity associated with the care of the episodic problem chemotherapy  addressed and managing the longitudinal care of the patient due to the serious and/or complex managed problem(s) GI malignancies/cancer      FAN Meier, PA-C [facility]  Dept of Hematology/Oncology  ODIN to GI Oncology team         Route Chart for Scheduling    Med Onc Chart Routing      Follow up with physician 4 weeks and 6 weeks. See   Landen in 4 weeks with lab work, chemotherapy and discussion after MRI. See Dr Schuster in 6 weeks with lab work and FOLFIRI plus avastin   Follow up with RACHEL 2 weeks. FOLFIRI plus avastin   Infusion scheduling note    Injection scheduling note    Labs CBC, CMP and CEA   Scheduling:  Preferred lab:  Lab interval: every 2 weeks     Imaging MRI   Scheduled. Thank you   Pharmacy appointment    Other referrals

## 2024-12-02 ENCOUNTER — INFUSION (OUTPATIENT)
Dept: INFUSION THERAPY | Facility: HOSPITAL | Age: 74
End: 2024-12-02
Payer: MEDICARE

## 2024-12-02 VITALS
RESPIRATION RATE: 18 BRPM | TEMPERATURE: 98 F | HEIGHT: 67 IN | WEIGHT: 122.44 LBS | DIASTOLIC BLOOD PRESSURE: 68 MMHG | BODY MASS INDEX: 19.22 KG/M2 | SYSTOLIC BLOOD PRESSURE: 112 MMHG | HEART RATE: 96 BPM | OXYGEN SATURATION: 100 %

## 2024-12-02 DIAGNOSIS — C78.7 METASTATIC COLON CANCER TO LIVER: Primary | ICD-10-CM

## 2024-12-02 DIAGNOSIS — C18.9 METASTATIC COLON CANCER TO LIVER: Primary | ICD-10-CM

## 2024-12-02 PROCEDURE — 96375 TX/PRO/DX INJ NEW DRUG ADDON: CPT

## 2024-12-02 PROCEDURE — 96413 CHEMO IV INFUSION 1 HR: CPT

## 2024-12-02 PROCEDURE — 96416 CHEMO PROLONG INFUSE W/PUMP: CPT

## 2024-12-02 PROCEDURE — 25000003 PHARM REV CODE 250: Performed by: PHYSICIAN ASSISTANT

## 2024-12-02 PROCEDURE — 96367 TX/PROPH/DG ADDL SEQ IV INF: CPT

## 2024-12-02 PROCEDURE — 63600175 PHARM REV CODE 636 W HCPCS: Performed by: PHYSICIAN ASSISTANT

## 2024-12-02 RX ORDER — SODIUM CHLORIDE 0.9 % (FLUSH) 0.9 %
10 SYRINGE (ML) INJECTION
Status: DISCONTINUED | OUTPATIENT
Start: 2024-12-02 | End: 2024-12-02 | Stop reason: HOSPADM

## 2024-12-02 RX ORDER — EPINEPHRINE 0.3 MG/.3ML
0.3 INJECTION SUBCUTANEOUS ONCE AS NEEDED
Status: DISCONTINUED | OUTPATIENT
Start: 2024-12-02 | End: 2024-12-02 | Stop reason: HOSPADM

## 2024-12-02 RX ORDER — ATROPINE SULFATE 0.4 MG/ML
0.4 INJECTION, SOLUTION ENDOTRACHEAL; INTRAMEDULLARY; INTRAMUSCULAR; INTRAVENOUS; SUBCUTANEOUS ONCE AS NEEDED
Status: COMPLETED | OUTPATIENT
Start: 2024-12-02 | End: 2024-12-02

## 2024-12-02 RX ORDER — PROCHLORPERAZINE EDISYLATE 5 MG/ML
5 INJECTION INTRAMUSCULAR; INTRAVENOUS ONCE AS NEEDED
Status: DISCONTINUED | OUTPATIENT
Start: 2024-12-02 | End: 2024-12-02 | Stop reason: HOSPADM

## 2024-12-02 RX ORDER — HEPARIN 100 UNIT/ML
500 SYRINGE INTRAVENOUS
Status: DISCONTINUED | OUTPATIENT
Start: 2024-12-02 | End: 2024-12-02 | Stop reason: HOSPADM

## 2024-12-02 RX ORDER — DIPHENHYDRAMINE HYDROCHLORIDE 50 MG/ML
50 INJECTION INTRAMUSCULAR; INTRAVENOUS ONCE AS NEEDED
Status: DISCONTINUED | OUTPATIENT
Start: 2024-12-02 | End: 2024-12-02 | Stop reason: HOSPADM

## 2024-12-02 RX ADMIN — SODIUM CHLORIDE: 9 INJECTION, SOLUTION INTRAVENOUS at 10:12

## 2024-12-02 RX ADMIN — IRINOTECAN HYDROCHLORIDE 240 MG: 20 INJECTION, SOLUTION INTRAVENOUS at 11:12

## 2024-12-02 RX ADMIN — FLUOROURACIL 3695 MG: 50 INJECTION, SOLUTION INTRAVENOUS at 01:12

## 2024-12-02 RX ADMIN — DEXAMETHASONE SODIUM PHOSPHATE 0.25 MG: 4 INJECTION, SOLUTION INTRA-ARTICULAR; INTRALESIONAL; INTRAMUSCULAR; INTRAVENOUS; SOFT TISSUE at 11:12

## 2024-12-02 RX ADMIN — ATROPINE SULFATE 0.4 MG: 0.4 INJECTION, SOLUTION INTRAVENOUS at 11:12

## 2024-12-02 NOTE — PLAN OF CARE
1100 - Labs, hx, and medications reviewed, Pt meets parameters for treatment today. Assessment completed and plan of care reviewed. Pt verbalized understanding. PAC accessed without complications. Pt voices no new complaints or concerns, will continue to monitor for safety.

## 2024-12-02 NOTE — PLAN OF CARE
1320 - Pt tolerated Irinotecan infusion well today, no complaints or complications. Positive blood return noted from port, connected to CADD pump for 5-FU home infusion, connection sites secured, pump infusing properly at time of discharge. Pt aware to call provider with any questions or concerns, knows to return to clinic at approx 11:20 on 12/4 for pump d/c, verbalized understanding. Pt aware to call provider with any questions or concerns, aware of upcoming appts, ambulatory from clinic independently with steady gait, no distress noted, accompanied by daughter.

## 2024-12-04 ENCOUNTER — INFUSION (OUTPATIENT)
Dept: INFUSION THERAPY | Facility: HOSPITAL | Age: 74
End: 2024-12-04
Payer: MEDICARE

## 2024-12-04 VITALS
OXYGEN SATURATION: 98 % | DIASTOLIC BLOOD PRESSURE: 59 MMHG | RESPIRATION RATE: 18 BRPM | HEART RATE: 99 BPM | SYSTOLIC BLOOD PRESSURE: 99 MMHG | TEMPERATURE: 98 F

## 2024-12-04 DIAGNOSIS — C78.7 METASTATIC COLON CANCER TO LIVER: Primary | ICD-10-CM

## 2024-12-04 DIAGNOSIS — C18.9 METASTATIC COLON CANCER TO LIVER: Primary | ICD-10-CM

## 2024-12-04 PROCEDURE — A4216 STERILE WATER/SALINE, 10 ML: HCPCS | Performed by: PHYSICIAN ASSISTANT

## 2024-12-04 PROCEDURE — 63600175 PHARM REV CODE 636 W HCPCS: Mod: JZ,JG | Performed by: PHYSICIAN ASSISTANT

## 2024-12-04 PROCEDURE — 25000003 PHARM REV CODE 250: Performed by: PHYSICIAN ASSISTANT

## 2024-12-04 PROCEDURE — 96377 APPLICATON ON-BODY INJECTOR: CPT

## 2024-12-04 RX ORDER — HEPARIN 100 UNIT/ML
500 SYRINGE INTRAVENOUS
Status: DISCONTINUED | OUTPATIENT
Start: 2024-12-04 | End: 2024-12-04 | Stop reason: HOSPADM

## 2024-12-04 RX ORDER — SODIUM CHLORIDE 0.9 % (FLUSH) 0.9 %
10 SYRINGE (ML) INJECTION
Status: DISCONTINUED | OUTPATIENT
Start: 2024-12-04 | End: 2024-12-04 | Stop reason: HOSPADM

## 2024-12-04 RX ADMIN — PEGFILGRASTIM 6 MG: KIT SUBCUTANEOUS at 11:12

## 2024-12-04 RX ADMIN — Medication 10 ML: at 11:12

## 2024-12-04 RX ADMIN — HEPARIN 500 UNITS: 100 SYRINGE at 11:12

## 2024-12-04 NOTE — PLAN OF CARE
1133-Pt ambulatory to clinic today for pump d/c. Denies any complaints. Pump completed prior to arrival. Port deaccessed after flushing. Neulasta OBI to left arm, green light flashing upon d/c. Ambulatory from clinic in [facility].

## 2024-12-13 ENCOUNTER — HOSPITAL ENCOUNTER (OUTPATIENT)
Dept: RADIOLOGY | Facility: HOSPITAL | Age: 74
Discharge: HOME | End: 2024-12-13
Payer: MEDICARE

## 2024-12-13 DIAGNOSIS — C78.7 METASTATIC COLON CANCER TO LIVER: ICD-10-CM

## 2024-12-13 DIAGNOSIS — C18.9 METASTATIC COLON CANCER TO LIVER: ICD-10-CM

## 2024-12-13 PROCEDURE — 74183 MRI ABD W/O CNTR FLWD CNTR: CPT | Mod: TC

## 2024-12-13 PROCEDURE — 74183 MRI ABD W/O CNTR FLWD CNTR: CPT | Mod: 26,,, | Performed by: RADIOLOGY

## 2024-12-13 PROCEDURE — 25500020 PHARM REV CODE 255

## 2024-12-13 PROCEDURE — A9585 GADOBUTROL INJECTION: HCPCS

## 2024-12-13 RX ORDER — GADOBUTROL 604.72 MG/ML
10 INJECTION INTRAVENOUS
Status: COMPLETED | OUTPATIENT
Start: 2024-12-13 | End: 2024-12-13

## 2024-12-13 RX ADMIN — GADOBUTROL 10 ML: 604.72 INJECTION INTRAVENOUS at 04:12

## 2024-12-16 ENCOUNTER — OFFICE VISIT (OUTPATIENT)
Dept: HEMATOLOGY/ONCOLOGY | Facility: CLINIC | Age: 74
End: 2024-12-16
Payer: MEDICARE

## 2024-12-16 ENCOUNTER — LAB VISIT (OUTPATIENT)
Dept: LAB | Facility: HOSPITAL | Age: 74
End: 2024-12-16
Payer: MEDICARE

## 2024-12-16 ENCOUNTER — INFUSION (OUTPATIENT)
Dept: INFUSION THERAPY | Facility: HOSPITAL | Age: 74
End: 2024-12-16
Payer: MEDICARE

## 2024-12-16 VITALS
SYSTOLIC BLOOD PRESSURE: 98 MMHG | HEART RATE: 104 BPM | TEMPERATURE: 97 F | OXYGEN SATURATION: 98 % | DIASTOLIC BLOOD PRESSURE: 61 MMHG | WEIGHT: 120.38 LBS | BODY MASS INDEX: 18.85 KG/M2

## 2024-12-16 VITALS
RESPIRATION RATE: 18 BRPM | HEART RATE: 98 BPM | TEMPERATURE: 98 F | SYSTOLIC BLOOD PRESSURE: 100 MMHG | HEIGHT: 67 IN | BODY MASS INDEX: 18.89 KG/M2 | WEIGHT: 120.38 LBS | DIASTOLIC BLOOD PRESSURE: 58 MMHG

## 2024-12-16 DIAGNOSIS — I95.9 HYPOTENSION, UNSPECIFIED HYPOTENSION TYPE: ICD-10-CM

## 2024-12-16 DIAGNOSIS — C18.9 METASTATIC COLON CANCER TO LIVER: Primary | ICD-10-CM

## 2024-12-16 DIAGNOSIS — C78.7 METASTATIC COLON CANCER TO LIVER: Primary | ICD-10-CM

## 2024-12-16 DIAGNOSIS — Z79.899 IMMUNODEFICIENCY DUE TO CHEMOTHERAPY: ICD-10-CM

## 2024-12-16 DIAGNOSIS — R39.9 LOWER URINARY TRACT SYMPTOMS (LUTS): ICD-10-CM

## 2024-12-16 DIAGNOSIS — T45.1X5A IMMUNODEFICIENCY DUE TO CHEMOTHERAPY: ICD-10-CM

## 2024-12-16 DIAGNOSIS — T45.1X5A CHEMOTHERAPY INDUCED NEUTROPENIA: ICD-10-CM

## 2024-12-16 DIAGNOSIS — C18.9 METASTATIC COLON CANCER TO LIVER: ICD-10-CM

## 2024-12-16 DIAGNOSIS — G89.3 NEOPLASM RELATED PAIN: ICD-10-CM

## 2024-12-16 DIAGNOSIS — K59.03 DRUG-INDUCED CONSTIPATION: ICD-10-CM

## 2024-12-16 DIAGNOSIS — D84.81 IMMUNODEFICIENCY SECONDARY TO NEOPLASM: ICD-10-CM

## 2024-12-16 DIAGNOSIS — T45.1X5A ANEMIA ASSOCIATED WITH CHEMOTHERAPY: ICD-10-CM

## 2024-12-16 DIAGNOSIS — C78.7 METASTATIC COLON CANCER TO LIVER: ICD-10-CM

## 2024-12-16 DIAGNOSIS — E86.0 DEHYDRATION: ICD-10-CM

## 2024-12-16 DIAGNOSIS — D70.1 CHEMOTHERAPY INDUCED NEUTROPENIA: ICD-10-CM

## 2024-12-16 DIAGNOSIS — D84.821 IMMUNODEFICIENCY DUE TO CHEMOTHERAPY: ICD-10-CM

## 2024-12-16 DIAGNOSIS — E43 SEVERE MALNUTRITION: ICD-10-CM

## 2024-12-16 DIAGNOSIS — R63.4 WEIGHT DECREASE: ICD-10-CM

## 2024-12-16 DIAGNOSIS — D64.81 ANEMIA ASSOCIATED WITH CHEMOTHERAPY: ICD-10-CM

## 2024-12-16 DIAGNOSIS — D49.9 IMMUNODEFICIENCY SECONDARY TO NEOPLASM: ICD-10-CM

## 2024-12-16 DIAGNOSIS — K92.2 LOWER GI BLEED: ICD-10-CM

## 2024-12-16 LAB
ALBUMIN SERPL BCP-MCNC: 2.3 G/DL (ref 3.5–5.2)
ALP SERPL-CCNC: 138 U/L (ref 40–150)
ALT SERPL W/O P-5'-P-CCNC: 7 U/L (ref 10–44)
ANION GAP SERPL CALC-SCNC: 11 MMOL/L (ref 8–16)
AST SERPL-CCNC: 25 U/L (ref 10–40)
BILIRUB SERPL-MCNC: 0.8 MG/DL (ref 0.1–1)
BUN SERPL-MCNC: 9 MG/DL (ref 8–23)
CALCIUM SERPL-MCNC: 8.2 MG/DL (ref 8.7–10.5)
CEA SERPL-MCNC: 46 NG/ML (ref 0–5)
CHLORIDE SERPL-SCNC: 105 MMOL/L (ref 95–110)
CO2 SERPL-SCNC: 21 MMOL/L (ref 23–29)
CREAT SERPL-MCNC: 0.8 MG/DL (ref 0.5–1.4)
ERYTHROCYTE [DISTWIDTH] IN BLOOD BY AUTOMATED COUNT: 21.4 % (ref 11.5–14.5)
EST. GFR  (NO RACE VARIABLE): >60 ML/MIN/1.73 M^2
GLUCOSE SERPL-MCNC: 93 MG/DL (ref 70–110)
HCT VFR BLD AUTO: 26.8 % (ref 40–54)
HGB BLD-MCNC: 7.9 G/DL (ref 14–18)
IMM GRANULOCYTES # BLD AUTO: 0.19 K/UL (ref 0–0.04)
MCH RBC QN AUTO: 25.9 PG (ref 27–31)
MCHC RBC AUTO-ENTMCNC: 29.5 G/DL (ref 32–36)
MCV RBC AUTO: 88 FL (ref 82–98)
NEUTROPHILS # BLD AUTO: 10.4 K/UL (ref 1.8–7.7)
PLATELET # BLD AUTO: 244 K/UL (ref 150–450)
PMV BLD AUTO: 11.5 FL (ref 9.2–12.9)
POTASSIUM SERPL-SCNC: 3.1 MMOL/L (ref 3.5–5.1)
PROT SERPL-MCNC: 5.7 G/DL (ref 6–8.4)
RBC # BLD AUTO: 3.05 M/UL (ref 4.6–6.2)
SODIUM SERPL-SCNC: 137 MMOL/L (ref 136–145)
WBC # BLD AUTO: 12.66 K/UL (ref 3.9–12.7)

## 2024-12-16 PROCEDURE — 36415 COLL VENOUS BLD VENIPUNCTURE: CPT | Performed by: REGISTERED NURSE

## 2024-12-16 PROCEDURE — 25000003 PHARM REV CODE 250: Performed by: PHYSICIAN ASSISTANT

## 2024-12-16 PROCEDURE — 3008F BODY MASS INDEX DOCD: CPT | Mod: CPTII,S$GLB,, | Performed by: PHYSICIAN ASSISTANT

## 2024-12-16 PROCEDURE — 3288F FALL RISK ASSESSMENT DOCD: CPT | Mod: CPTII,S$GLB,, | Performed by: PHYSICIAN ASSISTANT

## 2024-12-16 PROCEDURE — 96367 TX/PROPH/DG ADDL SEQ IV INF: CPT

## 2024-12-16 PROCEDURE — 3074F SYST BP LT 130 MM HG: CPT | Mod: CPTII,S$GLB,, | Performed by: PHYSICIAN ASSISTANT

## 2024-12-16 PROCEDURE — G2211 COMPLEX E/M VISIT ADD ON: HCPCS | Mod: S$GLB,,, | Performed by: PHYSICIAN ASSISTANT

## 2024-12-16 PROCEDURE — 63600175 PHARM REV CODE 636 W HCPCS: Performed by: PHYSICIAN ASSISTANT

## 2024-12-16 PROCEDURE — 3078F DIAST BP <80 MM HG: CPT | Mod: CPTII,S$GLB,, | Performed by: PHYSICIAN ASSISTANT

## 2024-12-16 PROCEDURE — 96413 CHEMO IV INFUSION 1 HR: CPT

## 2024-12-16 PROCEDURE — 99215 OFFICE O/P EST HI 40 MIN: CPT | Mod: S$GLB,,, | Performed by: PHYSICIAN ASSISTANT

## 2024-12-16 PROCEDURE — 96375 TX/PRO/DX INJ NEW DRUG ADDON: CPT

## 2024-12-16 PROCEDURE — 82378 CARCINOEMBRYONIC ANTIGEN: CPT | Performed by: REGISTERED NURSE

## 2024-12-16 PROCEDURE — 1101F PT FALLS ASSESS-DOCD LE1/YR: CPT | Mod: CPTII,S$GLB,, | Performed by: PHYSICIAN ASSISTANT

## 2024-12-16 PROCEDURE — 85027 COMPLETE CBC AUTOMATED: CPT | Performed by: REGISTERED NURSE

## 2024-12-16 PROCEDURE — 80053 COMPREHEN METABOLIC PANEL: CPT | Performed by: REGISTERED NURSE

## 2024-12-16 PROCEDURE — 1160F RVW MEDS BY RX/DR IN RCRD: CPT | Mod: CPTII,S$GLB,, | Performed by: PHYSICIAN ASSISTANT

## 2024-12-16 PROCEDURE — 1159F MED LIST DOCD IN RCRD: CPT | Mod: CPTII,S$GLB,, | Performed by: PHYSICIAN ASSISTANT

## 2024-12-16 PROCEDURE — 96416 CHEMO PROLONG INFUSE W/PUMP: CPT

## 2024-12-16 PROCEDURE — 99999 PR PBB SHADOW E&M-EST. PATIENT-LVL IV: CPT | Mod: PBBFAC,,, | Performed by: PHYSICIAN ASSISTANT

## 2024-12-16 PROCEDURE — 1126F AMNT PAIN NOTED NONE PRSNT: CPT | Mod: CPTII,S$GLB,, | Performed by: PHYSICIAN ASSISTANT

## 2024-12-16 RX ORDER — DIPHENHYDRAMINE HYDROCHLORIDE 50 MG/ML
50 INJECTION INTRAMUSCULAR; INTRAVENOUS ONCE AS NEEDED
Status: CANCELLED | OUTPATIENT
Start: 2024-12-16

## 2024-12-16 RX ORDER — SODIUM CHLORIDE 0.9 % (FLUSH) 0.9 %
10 SYRINGE (ML) INJECTION
Status: CANCELLED | OUTPATIENT
Start: 2024-12-16

## 2024-12-16 RX ORDER — PROCHLORPERAZINE EDISYLATE 5 MG/ML
5 INJECTION INTRAMUSCULAR; INTRAVENOUS ONCE AS NEEDED
Status: CANCELLED
Start: 2024-12-16

## 2024-12-16 RX ORDER — ATROPINE SULFATE 0.4 MG/ML
0.4 INJECTION, SOLUTION ENDOTRACHEAL; INTRAMEDULLARY; INTRAMUSCULAR; INTRAVENOUS; SUBCUTANEOUS ONCE AS NEEDED
Status: CANCELLED | OUTPATIENT
Start: 2024-12-16

## 2024-12-16 RX ORDER — HEPARIN 100 UNIT/ML
500 SYRINGE INTRAVENOUS
Status: CANCELLED | OUTPATIENT
Start: 2024-12-16

## 2024-12-16 RX ORDER — SODIUM CHLORIDE 0.9 % (FLUSH) 0.9 %
10 SYRINGE (ML) INJECTION
Status: CANCELLED | OUTPATIENT
Start: 2024-12-17

## 2024-12-16 RX ORDER — ATROPINE SULFATE 0.4 MG/ML
0.4 INJECTION, SOLUTION ENDOTRACHEAL; INTRAMEDULLARY; INTRAMUSCULAR; INTRAVENOUS; SUBCUTANEOUS ONCE AS NEEDED
Status: COMPLETED | OUTPATIENT
Start: 2024-12-16 | End: 2024-12-16

## 2024-12-16 RX ORDER — HEPARIN 100 UNIT/ML
500 SYRINGE INTRAVENOUS
Status: CANCELLED | OUTPATIENT
Start: 2024-12-17

## 2024-12-16 RX ORDER — DIPHENHYDRAMINE HYDROCHLORIDE 50 MG/ML
50 INJECTION INTRAMUSCULAR; INTRAVENOUS ONCE AS NEEDED
Status: DISCONTINUED | OUTPATIENT
Start: 2024-12-16 | End: 2024-12-16 | Stop reason: HOSPADM

## 2024-12-16 RX ORDER — EPINEPHRINE 0.3 MG/.3ML
0.3 INJECTION SUBCUTANEOUS ONCE AS NEEDED
Status: DISCONTINUED | OUTPATIENT
Start: 2024-12-16 | End: 2024-12-16 | Stop reason: HOSPADM

## 2024-12-16 RX ORDER — PROCHLORPERAZINE EDISYLATE 5 MG/ML
5 INJECTION INTRAMUSCULAR; INTRAVENOUS ONCE AS NEEDED
Status: DISCONTINUED | OUTPATIENT
Start: 2024-12-16 | End: 2024-12-16 | Stop reason: HOSPADM

## 2024-12-16 RX ORDER — EPINEPHRINE 0.3 MG/.3ML
0.3 INJECTION SUBCUTANEOUS ONCE AS NEEDED
Status: CANCELLED | OUTPATIENT
Start: 2024-12-16

## 2024-12-16 RX ORDER — HEPARIN 100 UNIT/ML
500 SYRINGE INTRAVENOUS
Status: DISCONTINUED | OUTPATIENT
Start: 2024-12-16 | End: 2024-12-16 | Stop reason: HOSPADM

## 2024-12-16 RX ORDER — SODIUM CHLORIDE 0.9 % (FLUSH) 0.9 %
10 SYRINGE (ML) INJECTION
Status: DISCONTINUED | OUTPATIENT
Start: 2024-12-16 | End: 2024-12-16 | Stop reason: HOSPADM

## 2024-12-16 RX ADMIN — SODIUM CHLORIDE: 9 INJECTION, SOLUTION INTRAVENOUS at 01:12

## 2024-12-16 RX ADMIN — FLUOROURACIL 3695 MG: 50 INJECTION, SOLUTION INTRAVENOUS at 03:12

## 2024-12-16 RX ADMIN — ATROPINE SULFATE 0.4 MG: 0.4 INJECTION, SOLUTION INTRAVENOUS at 01:12

## 2024-12-16 RX ADMIN — IRINOTECAN HYDROCHLORIDE 240 MG: 20 INJECTION, SOLUTION INTRAVENOUS at 01:12

## 2024-12-16 RX ADMIN — PALONOSETRON HYDROCHLORIDE 0.25 MG: 0.25 INJECTION, SOLUTION INTRAVENOUS at 01:12

## 2024-12-16 NOTE — PLAN OF CARE
1530 pt tolerated folfiri infusion without issue, cadd pump infusing at 2.2 ml/hr without issue prior to d/c, pt to rtc Wednesday around 1330 for pump d/c, no distress noted upon d/c to home with daughter

## 2024-12-16 NOTE — PLAN OF CARE
1330 pt here for C36 Folfiri infusion,labs, hx, meds, allergies reviewed, pt with no new complaints at this time, reclined in chair, continue to monitor

## 2024-12-16 NOTE — PROGRESS NOTES
[facility]  [facility]  Hematology/Medical Oncology Clinic     PATIENT: Javier Downs  MRN: [id number]  DATE: 12/17/2024    Diagnosis: Transverse colon metastatic cancer to the liver     Oncological history:   04/20/2021: metastatic colon cancer to the liver, moderately differentiated, pMMR  05/06/2021: C1 mFOLFOX + Pema  05/20/2021: C2 mFOLFOX + Pema  06/03/2021: C3 mFOLFOX + Pema   06/17/2021: C4 mFOLFOX + Pema  07/01/2021: C5 mFOLFOX + Pema  07/15/2021: C6 mFOLFOX + Pema  Restaging CT CAP: significant improvement of lesions   07/29/2021: C7 mFOLFOX + Pema   08/12/2021: Switched to maintenance  5FU+Pema (C8)  06/23/2022: C30 maintenance 5FU+Pema  10/20/2022: R hemicolectomy with Dr. Bonilla  12/30/2022: Y-90 to R liver  1/27/2023: Y-90 to R liver  5/17/2023: Y-90 to R liver  7/11/2023: C1 FOLFIRI + Pema  7/26/2023: C2 FOLFIRI + Pema  8/8/2023: C3 FOLFIRI + Pema  8/22/23: C4 FOLFIRI + Pema  Restaging CT CAP 9/1/23: Good response to chemo.  9/7/23: C5 FOLFIRI + Pema  ......................................  Restaging CT CAP 10/12/23: Good response to chemo  10/16/23: C8 FOLFIRI + Pema  .......................................  Restaging CT CAP 12/8/23: Good response to chemo  12/11/23: C12 FOLFIRI + Pema  .......................................  Restaging CT CAP 2/5/24: Good response to chemo  2/8/24: C16 FOLFIRI + Pema    Restaging CT CAP 4/26/24: Good response to chemo  Restaging CT CAP 6/6/24: Good response to chemo  Restaging CT CAP 8/1/24: Stable disease  Restaging CT CAP 10/11/24: Progression of R liver lobe mass.  Plan for Y-90 to this.      Interval History:   He presents today with his daughter prior to cycle 36 of FOLFIRI (off Avastin due to GIB). He is feeling fairly. He continues to have increased fatigue, shortness of breath. This worsens after exertion as well. He has not fallen. He also reports having intermittent chest and back pain but this comes/goes. Diarrhea is stable. He is not  taking any Imodium or medication for this. Underwent Y90 mapping on 24.  Denies any further pain.  Denies any blood in his stool. He is eating and his appetite is fair.     ECOG status is 1. Presents with his daughter today.    Past Medical History:   Past Medical History:   Diagnosis Date    MARIA A (acute kidney injury) 2022    Hypertension     Metastatic colon cancer to liver 2021     Past Surgical HIstory:   Past Surgical History:   Procedure Laterality Date    COLONOSCOPY N/A 2021    Procedure: COLONOSCOPY with possible stent;  Surgeon: EDILMA Bonilla MD;  Location: [facility] ENDO (2ND FLR);  Service: Colon and Rectal;  Laterality: N/A;    COLONOSCOPY N/A 11/3/2023    Procedure: COLONOSCOPY;  Surgeon: EDILMA Bonilla MD;  Location: [facility] (4TH FLR);  Service: Endoscopy;  Laterality: N/A;  prep instructions sent to pt via portal  From: EDILMA Bonilla MD  Procedure: Colonoscopy  Diagnosis: Surveillance colonoscopy - Hx of colon cancer  Procedure Timin-12 weeks  Provider: Myself  Location: [address]  Additional Scheduling Information: No scheduling con    COLONOSCOPY N/A 7/10/2024    Procedure: COLONOSCOPY;  Surgeon: Tam Pantoja MD;  Location: [facility] ENDO (2ND FLR);  Service: Endoscopy;  Laterality: N/A;    DIAGNOSTIC LAPAROSCOPY N/A 2022    Procedure: LAPAROSCOPY, DIAGNOSTIC;  Surgeon: Adrian Rodriguez MD;  [facility];  Service: General;  Laterality: N/A;    INSERTION OF TUNNELED CENTRAL VENOUS CATHETER (CVC) WITH SUBCUTANEOUS PORT N/A 5/3/2021    Procedure: [id number];  Surgeon: Francisco Layton MD;  [facility];  Service: Vascular;  Laterality: N/A;    OMENTECTOMY N/A 10/20/2022    Procedure: OMENTECTOMY;  Surgeon: EDILMA Bonilla MD;  Location: [facility] OR [address] [place] [address];  Service: Colon and Rectal;  Laterality: N/A;    RIGHT HEMICOLECTOMY N/A 10/20/2022    Procedure: HEMICOLECTOMY, RIGHT, extended;  Surgeon: EDILMA Bonilla MD;  Location:  [facility] OR 2ND FLR;  Service: Colon and Rectal;  Laterality: N/A;  Extended right hemicolectomy CONSENT IN AM     Family History:   Family History   Problem Relation Name Age of Onset    Cancer Brother         Social History:  reports that he has never smoked. He has never used smokeless tobacco. He reports that he does not currently use alcohol. He reports that he does not use drugs.    Allergies:  Review of patient's allergies indicates:  No Known Allergies    Medications:  Current Outpatient Medications   Medication Sig Dispense Refill    acetaminophen (TYLENOL) 500 MG tablet Take 1 tablet (500 mg total) by mouth every 6 (six) hours as needed for Pain (alternate with ibuprofen).  0    amLODIPine (NORVASC) 10 MG tablet Take 1 tablet (10 mg total) by mouth once daily. 90 tablet 3    LIDOcaine-prilocaine (EMLA) cream Apply topically as needed (port application). 30 g 3    ondansetron (ZOFRAN) 4 MG tablet Take 1 tablet (4 mg total) by mouth every 8 (eight) hours as needed for Nausea. 30 tablet 3    oxyCODONE (ROXICODONE) 5 MG immediate release tablet Take 1 tablet (5 mg total) by mouth every 6 (six) hours as needed for Pain. 20 tablet 0    pantoprazole (PROTONIX) 40 MG tablet Take 1 tablet (40 mg total) by mouth 2 (two) times daily before meals. 180 tablet 3    tamsulosin (FLOMAX) 0.4 mg Cap Take 1 capsule (0.4 mg total) by mouth once daily. 30 capsule 5    traMADoL (ULTRAM) 50 mg tablet Take 1 tablet (50 mg total) by mouth every 6 (six) hours as needed for Pain. 30 tablet 0     No current facility-administered medications for this visit.     Review of Systems   Constitutional:  Positive for fatigue. Negative for activity change and appetite change.   Eyes:  Negative for visual disturbance.   Respiratory:  Negative for cough and shortness of breath.    Cardiovascular:  Negative for chest pain and leg swelling.   Gastrointestinal:  Positive for abdominal pain. Negative for abdominal distention, blood in stool,  constipation, diarrhea, nausea and vomiting.   Musculoskeletal:  Positive for back pain. Negative for arthralgias.   Skin:  Negative for wound.   Neurological:  Negative for dizziness, weakness and numbness.   Psychiatric/Behavioral:  Negative for confusion and sleep disturbance. The patient is not nervous/anxious.    All other systems reviewed and are negative.    ECOG Performance Status: 1     Objective:      Vitals:   Vitals:    12/16/24 1125   BP: 98/61   BP Location: Right arm   Patient Position: Sitting   Pulse: 104   Temp: 97.4 °F (36.3 °C)   SpO2: 98%   Weight: 54.6 kg (120 lb 5.9 oz)     Physical Exam  Vitals reviewed.   Constitutional:       General: He is not in acute distress.     Appearance: Normal appearance. He is not ill-appearing, toxic-appearing or diaphoretic.      Comments: Frail, fatigue appearing   HENT:      Head: Normocephalic and atraumatic.      Right Ear: External ear normal.      Left Ear: External ear normal.      Nose: Nose normal.      Mouth/Throat:      Pharynx: Oropharynx is clear.   Eyes:      General: No scleral icterus.     Extraocular Movements: Extraocular movements intact.      Conjunctiva/sclera: Conjunctivae normal.      Pupils: Pupils are equal, round, and reactive to light.   Cardiovascular:      Rate and Rhythm: Normal rate and regular rhythm.      Pulses: Normal pulses.      Heart sounds: Normal heart sounds. No murmur heard.  Pulmonary:      Effort: Pulmonary effort is normal. No respiratory distress.      Breath sounds: Normal breath sounds. No wheezing.   Chest:      Comments: Port to RCW, no signs of infection.  Abdominal:      General: Abdomen is flat. Bowel sounds are normal. There is no distension.      Palpations: Abdomen is soft. There is no mass.      Tenderness: There is no abdominal tenderness.      Comments: Vertical midline abdominal wound well healed   Musculoskeletal:         General: No swelling or deformity. Normal range of motion.      Right lower leg:  No edema.      Left lower leg: No edema.   Skin:     Coloration: Skin is not jaundiced or pale.      Findings: No bruising, erythema or rash.   Neurological:      General: No focal deficit present.      Mental Status: He is alert and oriented to person, place, and time. Mental status is at baseline.      Cranial Nerves: No cranial nerve deficit.      Sensory: No sensory deficit.      Gait: Gait normal.   Psychiatric:         Mood and Affect: Mood normal.         Behavior: Behavior normal.         Thought Content: Thought content normal.         Judgment: Judgment normal.   Laboratory Data:  Lab Visit on 12/16/2024   Component Date Value Ref Range Status    CEA 12/16/2024 46.0 (H)  0.0 - 5.0 ng/mL Final    Comment: CEA Normal Range:  Non-Smokers: 0-3.0 ng/mL  Smokers:     0-5.0 ng/mL    The testing method is a chemiluminescent microparticle immunoassay   manufactured by [employer] and performed on the [employer]   or   [employer] system. Values obtained with different assay manufacturers   for   methods may be different and cannot be used interchangeably.      Sodium 12/16/2024 137  136 - 145 mmol/L Final    Potassium 12/16/2024 3.1 (L)  3.5 - 5.1 mmol/L Final    Chloride 12/16/2024 105  95 - 110 mmol/L Final    CO2 12/16/2024 21 (L)  23 - 29 mmol/L Final    Glucose 12/16/2024 93  70 - 110 mg/dL Final    BUN 12/16/2024 9  8 - 23 mg/dL Final    Creatinine 12/16/2024 0.8  0.5 - 1.4 mg/dL Final    Calcium 12/16/2024 8.2 (L)  8.7 - 10.5 mg/dL Final    Total Protein 12/16/2024 5.7 (L)  6.0 - 8.4 g/dL Final    Albumin 12/16/2024 2.3 (L)  3.5 - 5.2 g/dL Final    Total Bilirubin 12/16/2024 0.8  0.1 - 1.0 mg/dL Final    Comment: For infants and newborns, interpretation of results should be based  on gestational age, weight and in agreement with clinical  observations.    Premature Infant recommended reference ranges:  Up to 24 hours.............<8.0 mg/dL  Up to 48 hours............<12.0 mg/dL  3-5  days..................<15.0 mg/dL  6-29 days.................<15.0 mg/dL      Alkaline Phosphatase 12/16/2024 138  40 - 150 U/L Final    AST 12/16/2024 25  10 - 40 U/L Final    ALT 12/16/2024 7 (L)  10 - 44 U/L Final    eGFR 12/16/2024 >60.0  >60 mL/min/1.73 m^2 Final    Anion Gap 12/16/2024 11  8 - 16 mmol/L Final    WBC 12/16/2024 12.66  3.90 - 12.70 K/uL Final    RBC 12/16/2024 3.05 (L)  4.60 - 6.20 M/uL Final    Hemoglobin 12/16/2024 7.9 (L)  14.0 - 18.0 g/dL Final    Hematocrit 12/16/2024 26.8 (L)  40.0 - 54.0 % Final    MCV 12/16/2024 88  82 - 98 fL Final    MCH 12/16/2024 25.9 (L)  27.0 - 31.0 pg Final    MCHC 12/16/2024 29.5 (L)  32.0 - 36.0 g/dL Final    RDW 12/16/2024 21.4 (H)  11.5 - 14.5 % Final    Platelets 12/16/2024 244  150 - 450 K/uL Final    MPV 12/16/2024 11.5  9.2 - 12.9 fL Final    Gran # (ANC) 12/16/2024 10.4 (H)  1.8 - 7.7 K/uL Final    Comment: The ANC is based on a white cell differential from an   automated cell counter. It has not been microscopically   reviewed for the presence of abnormal cells. Clinical   correlation is required.      Immature Grans (Abs) 12/16/2024 0.19 (H)  0.00 - 0.04 K/uL Final    Comment: Mild elevation in immature granulocytes is non specific and   can be seen in a variety of conditions including stress response,   acute inflammation, trauma and pregnancy. Correlation with other   laboratory and clinical findings is essential.     Lab Visit on 12/13/2024   Component Date Value Ref Range Status    CEA 12/13/2024 52.4 (H)  0.0 - 5.0 ng/mL Final    Comment: CEA Normal Range:  Non-Smokers: 0-3.0 ng/mL  Smokers:     0-5.0 ng/mL    The testing method is a chemiluminescent microparticle immunoassay   manufactured by [employer] and performed on the [employer]   or   [employer] system. Values obtained with different assay manufacturers   for   methods may be different and cannot be used interchangeably.      Sodium 12/13/2024 139  136 - 145  mmol/L Final    Potassium 12/13/2024 3.7  3.5 - 5.1 mmol/L Final    Chloride 12/13/2024 105  95 - 110 mmol/L Final    CO2 12/13/2024 21 (L)  23 - 29 mmol/L Final    Glucose 12/13/2024 90  70 - 110 mg/dL Final    BUN 12/13/2024 6 (L)  8 - 23 mg/dL Final    Creatinine 12/13/2024 0.8  0.5 - 1.4 mg/dL Final    Calcium 12/13/2024 8.5 (L)  8.7 - 10.5 mg/dL Final    Total Protein 12/13/2024 6.1  6.0 - 8.4 g/dL Final    Albumin 12/13/2024 2.4 (L)  3.5 - 5.2 g/dL Final    Total Bilirubin 12/13/2024 1.0  0.1 - 1.0 mg/dL Final    Comment: For infants and newborns, interpretation of results should be based  on gestational age, weight and in agreement with clinical  observations.    Premature Infant recommended reference ranges:  Up to 24 hours.............<8.0 mg/dL  Up to 48 hours............<12.0 mg/dL  3-5 days..................<15.0 mg/dL  6-29 days.................<15.0 mg/dL      Alkaline Phosphatase 12/13/2024 176 (H)  40 - 150 U/L Final    AST 12/13/2024 28  10 - 40 U/L Final    ALT 12/13/2024 13  10 - 44 U/L Final    eGFR 12/13/2024 >60.0  >60 mL/min/1.73 m^2 Final    Anion Gap 12/13/2024 13  8 - 16 mmol/L Final     Assessment and Plan        1. Metastatic colon cancer to liver    2. Immunodeficiency secondary to neoplasm    3. Immunodeficiency due to chemotherapy    4. Chemotherapy induced neutropenia    5. Lower GI bleed    6. Anemia associated with chemotherapy    7. Weight decrease    8. Neoplasm related pain    9. Drug-induced constipation    10. Severe malnutrition    11. Hypotension, unspecified hypotension type    12. Lower urinary tract symptoms (LUTS)    13. Dehydration        1-6.  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  Guardant 360 was negative for BRAF, VIRI, HER2 amplification.   Pretreatment CEA 57.  We had a long and sonia discussion with him about his diagnosis. Unfortunately, the disease is not curable but remains treatable and he has good  performance status.   Restaging scans after 7 cycles of FOLFOX + Pema showed significant reduction in colonic mass and liver mass.   we switched to maintenance as of cycle 8 with 5FU + Pema  Restaging scans from March 2022 show stable disease.   MRI on 7/18/22 showing hepatic progression of disease in the right hepatic lobe noted on the exam. CT CAP on 8/26 shows some mild progression in both liver metastases.    Discussed case at colorectal tumor board 8/31/22 and had a diagnostic lap performed by Dr. Rodriguez on 9/7 to assess for any occult peritoneal disease. Findings from the diagnostic lap were negative. Fluid from around from around the primary mass was sent for cytology that was negative for malignancy.   Underwent primary tumor resection on 10/20/22 with Dr. Bonilla.    Meanwhile his liver mets had grown.  We discussed his case at [facility] conference with plans for short term Y-90 and then restarting chemotherapy prior to considering any surgical options to his liver metastases.  He underwent Y-90 delivery on 12/30/22. Tolerated well.   CT CAP on 1/23/23 reviewed at [facility] conference with good response to Y-90, no new lesions.  Underwent second Y-90 on 1/27/23. Can consider R hepatectomy pending follow-up imaging after Y-90.     Received cycle 42 of FOLFOX (omitted oxaliplatin again starting cycle 41 due to neuropathy) on 2/9/23.  Because of 5-FU shortage nationally, we have been holding chemotherapy since mid-February 2023.  If he needs to restart chemotherapy (I.e. no plans for surgical resection), will place him on capecitabine maintenance for duration of 5-FU shortage.     Discussed at colorectal liver [facility] tumor board 3/16/23.  Plan for repeat Y-90 and then consideration of surgical resection and he will meet with liver transplant team as well.  Underwent Y-90 delivery 5/17/23 with IR.     Has been on maintenance Xeloda since late April 2023.    Met with liver transplant team but ultimately opted not to proceed  with transplant as he was concerned about how he would tolerate a major surgery with the life changes that would come after transplant as well. They closed out his case.    He met with Dr. Rodriguez to discuss whether surgical resection is indicated for his liver mets.  He recommended repeat MRI.  Repeat MRI unfortunately showed disease progression while on Xeloda maintenance.  Similarly his CEA garrett precipitously, all in keeping with worsening disease.    We recommended we restart IV chemotherapy with FOLFIRI + Avastin (never had irinotecan).    Because of hyperbilirubinemia he received cycle 1 with just 5-FU + Avastin on 7/11/23. Tolerated this well. Bilirubin has improved.  Received irinotecan starting with cycle 2.  Repeat CT CAP after cycle 4 shows good response to therapy.   Excellent tolerance. [facility] tumor board agrees with continuation of chemotherapy.   Repeat CT CAP after cycle 7 shows stable disease, no evidence of new or worsening disease.   Repeat CT CAP after cycle 11 shows stable disease.   Repeat CT CAP after cycle 15 shows improved disease.  Repeat CT CAP after cycle 21 shows stable disease.   Repeat CT CAP after cycle 24 shows stable disease.    Hospitalized after cycle 26 and 27 (without Avastin) for hematochezia.  Colonoscopy with likely diverticular bleed.  Required 2 units of blood in last month.  Will continue to hold bevacizumab indefinitely.  Discussed that we don't have a very good way preventing this recurrence and chemo with its associated thrombocytopenia may make this more likely to occur again.  He still wishes to proceed, which is my recommendation as well.    Repeat CT CAP after cycle 28 shows stable disease and recc to continue with chemotherapy.   Repeat CT CAP after cycle 32 shows progression of R liver lobe metastasis with rising CEA.  Will plan for Y-90 to growing lesion.  Underwent mapping 11/5/24.    Will continue FOLFIRI in the meantime.  Delayed cycle 33 due to neutropenia - has  since recovered.    - He is doing fairly today. Continues to have fatigue with intermittent shortness of breath, chest and back pain. He continues to function however and is eating well. The chest pain is non-specific and vitals are stable today. No report of numbness/tingling. He does want to proceed with chemotherapy today.     - I have reviewed the CBC and CMP, adequate for treatment on Monday. Hb 7.9 this visit from 9.2. Discussed blood transfusion but he wants to wait.  - Bili 1.0 No signs of jaundice at this time. No fever. ALT normal. 2/2 Gilbert's syndrome, as he is a poor metabolizer of UGT1A1  - Will give IVFs today, 1L over an hour  - Proceed with cycle 36 of FOLFIRI today + OBI Neulasta.    RTC in 2 weeks. Had MRI completed on 12/13/2024. Will review with IR and see them on Thursday, 12/19    Tempus xT: APC, CKS1B cng, ERCC3 cnl, PIK3CA; KENIA, TMB 12.1.  Tempus xF: APC, KRAS Q61H, PIK3CA, TP53; KENIA    7-9. Neoplasm related pain, weight loss, constipation, malnutrition  -- Pain very well controlled. Has oxycodone 5mg to use PRN but not requiring now.  -- Following with nutrition. Encouraged increased protein. Monitor.  Weight down slightly but reports good appetite.  -- Continue Miralax daily for constipation. Doing well with this.     10. Hypotension   -- BP low normal today. Asymptomatic. Will give IVFs today as well.   -- Following with PCP.   -- encouraged him and his daughter to monitor BP and HR closely at home.     11,12. Urinary Hesitancy/dehydration  -- Refilled Flomax.   -- Reported improvement since starting medication.     - Low BP with tachycardia, suspected from dehydration. Hb has dropped this visit. Discussed giving blood today but patient wants to defer this for now. He will monitor his symptoms and let us know if they worsen. Will plan for blood vs IV iron for next visit. He is agreeable. However, if his symptoms significantly worsen, I advise him to go to the ER.   - Will order IVFs, 1L  over an hour today.   - Pt advised to increase oral hydration, at least 64 oz of water daily    RTC in 2 weeks    Pte and family members displayed understanding of the above encounter and treatment plan. All thoughtful questions were answered to their satisfaction. Pte was advised to notify the care team or proceed to the ER if signs and symptoms worsen.     Visit today included increased complexity associated with the care of the episodic problem chemotherapy  addressed and managing the longitudinal care of the patient due to the serious and/or complex managed problem(s) GI malignancies/cancer      FAN Meier, PA-C  [facility]  Dept of Hematology/Oncology  PA-C to GI Oncology team         Route Chart for Scheduling    Med Onc Chart Routing      Follow up with physician 2 weeks and 4 weeks. FOLFIRI plus neulasta   Follow up with RACHEL 6 weeks. FOLFIRI plus neulasta   Infusion scheduling note   Please plan for possible blood transfusion in 2 weeks vs IV iron. 1 unit. Type and Screen ordered   Injection scheduling note    Labs CBC, CMP, CEA and type and screen   Scheduling:  Preferred lab:  Lab interval: every 2 weeks     Imaging    Pharmacy appointment    Other referrals

## 2024-12-18 ENCOUNTER — INFUSION (OUTPATIENT)
Dept: INFUSION THERAPY | Facility: HOSPITAL | Age: 74
End: 2024-12-18
Payer: MEDICARE

## 2024-12-18 VITALS — DIASTOLIC BLOOD PRESSURE: 64 MMHG | RESPIRATION RATE: 18 BRPM | HEART RATE: 99 BPM | SYSTOLIC BLOOD PRESSURE: 100 MMHG

## 2024-12-18 DIAGNOSIS — C78.7 METASTATIC COLON CANCER TO LIVER: Primary | ICD-10-CM

## 2024-12-18 DIAGNOSIS — C18.9 METASTATIC COLON CANCER TO LIVER: Primary | ICD-10-CM

## 2024-12-18 PROCEDURE — A4216 STERILE WATER/SALINE, 10 ML: HCPCS | Performed by: PHYSICIAN ASSISTANT

## 2024-12-18 PROCEDURE — 25000003 PHARM REV CODE 250: Performed by: PHYSICIAN ASSISTANT

## 2024-12-18 PROCEDURE — 96377 APPLICATON ON-BODY INJECTOR: CPT

## 2024-12-18 PROCEDURE — 63600175 PHARM REV CODE 636 W HCPCS: Performed by: PHYSICIAN ASSISTANT

## 2024-12-18 RX ORDER — SODIUM CHLORIDE 0.9 % (FLUSH) 0.9 %
10 SYRINGE (ML) INJECTION
Status: DISCONTINUED | OUTPATIENT
Start: 2024-12-18 | End: 2024-12-18 | Stop reason: HOSPADM

## 2024-12-18 RX ORDER — HEPARIN 100 UNIT/ML
500 SYRINGE INTRAVENOUS
Status: DISCONTINUED | OUTPATIENT
Start: 2024-12-18 | End: 2024-12-18 | Stop reason: HOSPADM

## 2024-12-18 RX ADMIN — Medication 10 ML: at 01:12

## 2024-12-18 RX ADMIN — HEPARIN 500 UNITS: 100 SYRINGE at 01:12

## 2024-12-18 RX ADMIN — PEGFILGRASTIM 6 MG: KIT SUBCUTANEOUS at 01:12

## 2024-12-18 NOTE — NURSING
1322- Patient arrived ambulatory to the unit for pump dc with infusion to portable pump already complete.  No new complaints, no distress noted.  Port was flushed and heparin locked, then de-accessed.  Neulasta OBI applied to patients arm, her verbalized understanding monitoring and use of device.  Discharged to home setting, instructed him to contact MD with any issues or concerns.

## 2024-12-20 ENCOUNTER — TELEPHONE (OUTPATIENT)
Dept: INTERVENTIONAL RADIOLOGY/VASCULAR | Facility: CLINIC | Age: 74
End: 2024-12-20
Payer: MEDICARE

## 2024-12-20 ENCOUNTER — TELEPHONE (OUTPATIENT)
Dept: HEMATOLOGY/ONCOLOGY | Facility: CLINIC | Age: 74
End: 2024-12-20
Payer: MEDICARE

## 2024-12-20 ENCOUNTER — HOSPITAL ENCOUNTER (OUTPATIENT)
Facility: HOSPITAL | Age: 74
Discharge: HOME | DRG: 641 | End: 2024-12-21
Attending: EMERGENCY MEDICINE | Admitting: INTERNAL MEDICINE
Payer: MEDICARE

## 2024-12-20 DIAGNOSIS — A41.9 SEPSIS, DUE TO UNSPECIFIED ORGANISM, UNSPECIFIED WHETHER ACUTE ORGAN DYSFUNCTION PRESENT: ICD-10-CM

## 2024-12-20 DIAGNOSIS — J98.11 ATELECTASIS: ICD-10-CM

## 2024-12-20 DIAGNOSIS — K55.069 SUPERIOR MESENTERIC VEIN THROMBOSIS: ICD-10-CM

## 2024-12-20 DIAGNOSIS — D73.5 SPLENIC INFARCT: ICD-10-CM

## 2024-12-20 DIAGNOSIS — A41.9 SEPTIC SHOCK: ICD-10-CM

## 2024-12-20 DIAGNOSIS — K74.60 HEPATIC CIRRHOSIS, UNSPECIFIED HEPATIC CIRRHOSIS TYPE, UNSPECIFIED WHETHER ASCITES PRESENT: Primary | ICD-10-CM

## 2024-12-20 DIAGNOSIS — J90 PLEURAL EFFUSION: ICD-10-CM

## 2024-12-20 DIAGNOSIS — J18.9 PNEUMONIA OF RIGHT LUNG DUE TO INFECTIOUS ORGANISM, UNSPECIFIED PART OF LUNG: ICD-10-CM

## 2024-12-20 DIAGNOSIS — R65.21 SEPTIC SHOCK: ICD-10-CM

## 2024-12-20 DIAGNOSIS — R07.9 CHEST PAIN: ICD-10-CM

## 2024-12-20 DIAGNOSIS — Z13.6 SCREENING FOR CARDIOVASCULAR CONDITION: ICD-10-CM

## 2024-12-20 PROBLEM — D72.829 LEUKOCYTOSIS: Status: ACTIVE | Noted: 2024-12-20

## 2024-12-20 PROBLEM — R65.10 SIRS (SYSTEMIC INFLAMMATORY RESPONSE SYNDROME): Status: ACTIVE | Noted: 2024-12-20

## 2024-12-20 PROBLEM — D63.8 ANEMIA, CHRONIC DISEASE: Status: ACTIVE | Noted: 2024-12-20

## 2024-12-20 PROBLEM — D64.9 ANEMIA: Status: ACTIVE | Noted: 2024-12-20

## 2024-12-20 PROBLEM — E86.1 HYPOVOLEMIA: Status: ACTIVE | Noted: 2024-12-20

## 2024-12-20 PROBLEM — D72.829 LEUKOCYTOSIS: Status: RESOLVED | Noted: 2024-12-20 | Resolved: 2024-12-20

## 2024-12-20 LAB
ABO + RH BLD: NORMAL
ALBUMIN SERPL BCP-MCNC: 2.3 G/DL (ref 3.5–5.2)
ALLENS TEST: ABNORMAL
ALLENS TEST: NORMAL
ALP SERPL-CCNC: 150 U/L (ref 40–150)
ALT SERPL W/O P-5'-P-CCNC: 26 U/L (ref 10–44)
ANION GAP SERPL CALC-SCNC: 9 MMOL/L (ref 8–16)
ANISOCYTOSIS BLD QL SMEAR: SLIGHT
AST SERPL-CCNC: 44 U/L (ref 10–40)
BASOPHILS NFR BLD: 0 % (ref 0–1.9)
BILIRUB SERPL-MCNC: 0.9 MG/DL (ref 0.1–1)
BILIRUB UR QL STRIP: NEGATIVE
BLD GP AB SCN CELLS X3 SERPL QL: NORMAL
BUN SERPL-MCNC: 12 MG/DL (ref 8–23)
CALCIUM SERPL-MCNC: 7.9 MG/DL (ref 8.7–10.5)
CHLORIDE SERPL-SCNC: 107 MMOL/L (ref 95–110)
CLARITY UR REFRACT.AUTO: CLEAR
CO2 SERPL-SCNC: 23 MMOL/L (ref 23–29)
COLOR UR AUTO: YELLOW
CREAT SERPL-MCNC: 0.7 MG/DL (ref 0.5–1.4)
DIFFERENTIAL METHOD BLD: ABNORMAL
EOSINOPHIL NFR BLD: 1 % (ref 0–8)
ERYTHROCYTE [DISTWIDTH] IN BLOOD BY AUTOMATED COUNT: 21.2 % (ref 11.5–14.5)
EST. GFR  (NO RACE VARIABLE): >60 ML/MIN/1.73 M^2
GLUCOSE SERPL-MCNC: 92 MG/DL (ref 70–110)
GLUCOSE UR QL STRIP: NEGATIVE
HCT VFR BLD AUTO: 30.6 % (ref 40–54)
HGB BLD-MCNC: 9.1 G/DL (ref 14–18)
HGB UR QL STRIP: NEGATIVE
HYPOCHROMIA BLD QL SMEAR: ABNORMAL
IMM GRANULOCYTES # BLD AUTO: ABNORMAL K/UL (ref 0–0.04)
IMM GRANULOCYTES NFR BLD AUTO: ABNORMAL % (ref 0–0.5)
INFLUENZA A, MOLECULAR: NEGATIVE
INFLUENZA B, MOLECULAR: NEGATIVE
KETONES UR QL STRIP: NEGATIVE
LACTATE SERPL-SCNC: 1.6 MMOL/L (ref 0.5–2.2)
LDH SERPL L TO P-CCNC: 1.6 MMOL/L (ref 0.5–2.2)
LDH SERPL L TO P-CCNC: 4.59 MMOL/L (ref 0.5–2.2)
LEUKOCYTE ESTERASE UR QL STRIP: NEGATIVE
LIPASE SERPL-CCNC: 11 U/L (ref 4–60)
LYMPHOCYTES NFR BLD: 1 % (ref 18–48)
MAGNESIUM SERPL-MCNC: 1.7 MG/DL (ref 1.6–2.6)
MCH RBC QN AUTO: 26.1 PG (ref 27–31)
MCHC RBC AUTO-ENTMCNC: 29.7 G/DL (ref 32–36)
MCV RBC AUTO: 88 FL (ref 82–98)
MONOCYTES NFR BLD: 1 % (ref 4–15)
NEUTROPHILS NFR BLD: 97 % (ref 38–73)
NITRITE UR QL STRIP: NEGATIVE
NRBC BLD-RTO: 0 /100 WBC
OHS QRS DURATION: 84 MS
OHS QTC CALCULATION: 479 MS
OVALOCYTES BLD QL SMEAR: ABNORMAL
PH UR STRIP: 6 [PH] (ref 5–8)
PHOSPHATE SERPL-MCNC: 2.9 MG/DL (ref 2.7–4.5)
PLATELET # BLD AUTO: 239 K/UL (ref 150–450)
PLATELET BLD QL SMEAR: ABNORMAL
PMV BLD AUTO: 11.2 FL (ref 9.2–12.9)
POIKILOCYTOSIS BLD QL SMEAR: SLIGHT
POTASSIUM SERPL-SCNC: 3.5 MMOL/L (ref 3.5–5.1)
PROT SERPL-MCNC: 5.4 G/DL (ref 6–8.4)
PROT UR QL STRIP: NEGATIVE
RBC # BLD AUTO: 3.48 M/UL (ref 4.6–6.2)
SAMPLE: ABNORMAL
SAMPLE: NORMAL
SARS-COV-2 RDRP RESP QL NAA+PROBE: NEGATIVE
SITE: ABNORMAL
SITE: NORMAL
SODIUM SERPL-SCNC: 139 MMOL/L (ref 136–145)
SP GR UR STRIP: >1.03 (ref 1–1.03)
SPECIMEN OUTDATE: NORMAL
SPECIMEN SOURCE: NORMAL
TROPONIN I SERPL DL<=0.01 NG/ML-MCNC: <3 NG/L (ref 0–35)
URN SPEC COLLECT METH UR: ABNORMAL
WBC # BLD AUTO: 24.59 K/UL (ref 3.9–12.7)
WBC TOXIC VACUOLES BLD QL SMEAR: PRESENT

## 2024-12-20 PROCEDURE — 99285 EMERGENCY DEPT VISIT HI MDM: CPT | Mod: 25

## 2024-12-20 PROCEDURE — 93010 ELECTROCARDIOGRAM REPORT: CPT | Mod: ,,, | Performed by: INTERNAL MEDICINE

## 2024-12-20 PROCEDURE — 83690 ASSAY OF LIPASE: CPT

## 2024-12-20 PROCEDURE — 99900035 HC TECH TIME PER 15 MIN (STAT)

## 2024-12-20 PROCEDURE — 80053 COMPREHEN METABOLIC PANEL: CPT | Performed by: EMERGENCY MEDICINE

## 2024-12-20 PROCEDURE — 86900 BLOOD TYPING SEROLOGIC ABO: CPT

## 2024-12-20 PROCEDURE — 83735 ASSAY OF MAGNESIUM: CPT | Performed by: EMERGENCY MEDICINE

## 2024-12-20 PROCEDURE — 12000002 HC ACUTE/MED SURGE SEMI-PRIVATE ROOM

## 2024-12-20 PROCEDURE — 87040 BLOOD CULTURE FOR BACTERIA: CPT | Mod: 59

## 2024-12-20 PROCEDURE — 25500020 PHARM REV CODE 255: Performed by: EMERGENCY MEDICINE

## 2024-12-20 PROCEDURE — 63600175 PHARM REV CODE 636 W HCPCS

## 2024-12-20 PROCEDURE — 87502 INFLUENZA DNA AMP PROBE: CPT

## 2024-12-20 PROCEDURE — 96375 TX/PRO/DX INJ NEW DRUG ADDON: CPT

## 2024-12-20 PROCEDURE — 85007 BL SMEAR W/DIFF WBC COUNT: CPT

## 2024-12-20 PROCEDURE — 63600175 PHARM REV CODE 636 W HCPCS: Mod: HCNC

## 2024-12-20 PROCEDURE — 96361 HYDRATE IV INFUSION ADD-ON: CPT

## 2024-12-20 PROCEDURE — 83605 ASSAY OF LACTIC ACID: CPT

## 2024-12-20 PROCEDURE — 96376 TX/PRO/DX INJ SAME DRUG ADON: CPT | Mod: HCNC

## 2024-12-20 PROCEDURE — 96360 HYDRATION IV INFUSION INIT: CPT | Mod: 59

## 2024-12-20 PROCEDURE — 85027 COMPLETE CBC AUTOMATED: CPT

## 2024-12-20 PROCEDURE — 20600001 HC STEP DOWN PRIVATE ROOM

## 2024-12-20 PROCEDURE — G0378 HOSPITAL OBSERVATION PER HR: HCPCS | Mod: HCNC

## 2024-12-20 PROCEDURE — 99223 1ST HOSP IP/OBS HIGH 75: CPT | Mod: AI,GC,, | Performed by: INTERNAL MEDICINE

## 2024-12-20 PROCEDURE — 99291 CRITICAL CARE FIRST HOUR: CPT | Mod: HCNC

## 2024-12-20 PROCEDURE — 84484 ASSAY OF TROPONIN QUANT: CPT

## 2024-12-20 PROCEDURE — 96365 THER/PROPH/DIAG IV INF INIT: CPT

## 2024-12-20 PROCEDURE — 93005 ELECTROCARDIOGRAM TRACING: CPT

## 2024-12-20 PROCEDURE — 84100 ASSAY OF PHOSPHORUS: CPT | Performed by: EMERGENCY MEDICINE

## 2024-12-20 PROCEDURE — 81003 URINALYSIS AUTO W/O SCOPE: CPT

## 2024-12-20 PROCEDURE — 87635 SARS-COV-2 COVID-19 AMP PRB: CPT

## 2024-12-20 PROCEDURE — 27201040 HC RC 50 FILTER

## 2024-12-20 PROCEDURE — 25000003 PHARM REV CODE 250

## 2024-12-20 RX ORDER — GLUCAGON 1 MG
1 KIT INJECTION
Status: DISCONTINUED | OUTPATIENT
Start: 2024-12-20 | End: 2024-12-21 | Stop reason: HOSPADM

## 2024-12-20 RX ORDER — OXYCODONE HYDROCHLORIDE 5 MG/1
5 TABLET ORAL EVERY 6 HOURS PRN
Status: DISCONTINUED | OUTPATIENT
Start: 2024-12-20 | End: 2024-12-21 | Stop reason: HOSPADM

## 2024-12-20 RX ORDER — CEFEPIME HYDROCHLORIDE 2 G/1
2 INJECTION, POWDER, FOR SOLUTION INTRAVENOUS
Status: DISCONTINUED | OUTPATIENT
Start: 2024-12-20 | End: 2024-12-21 | Stop reason: HOSPADM

## 2024-12-20 RX ORDER — ENOXAPARIN SODIUM 100 MG/ML
40 INJECTION SUBCUTANEOUS EVERY 24 HOURS
Status: DISCONTINUED | OUTPATIENT
Start: 2024-12-20 | End: 2024-12-20

## 2024-12-20 RX ORDER — CEFEPIME HYDROCHLORIDE 1 G/1
1 INJECTION, POWDER, FOR SOLUTION INTRAMUSCULAR; INTRAVENOUS
Status: COMPLETED | OUTPATIENT
Start: 2024-12-20 | End: 2024-12-20

## 2024-12-20 RX ORDER — IBUPROFEN 200 MG
24 TABLET ORAL
Status: DISCONTINUED | OUTPATIENT
Start: 2024-12-20 | End: 2024-12-21 | Stop reason: HOSPADM

## 2024-12-20 RX ORDER — PANTOPRAZOLE SODIUM 40 MG/1
40 TABLET, DELAYED RELEASE ORAL DAILY
Status: DISCONTINUED | OUTPATIENT
Start: 2024-12-21 | End: 2024-12-21 | Stop reason: HOSPADM

## 2024-12-20 RX ORDER — NALOXONE HCL 0.4 MG/ML
0.02 VIAL (ML) INJECTION
Status: DISCONTINUED | OUTPATIENT
Start: 2024-12-20 | End: 2024-12-21 | Stop reason: HOSPADM

## 2024-12-20 RX ORDER — TALC
6 POWDER (GRAM) TOPICAL NIGHTLY PRN
Status: DISCONTINUED | OUTPATIENT
Start: 2024-12-20 | End: 2024-12-21 | Stop reason: HOSPADM

## 2024-12-20 RX ORDER — IBUPROFEN 200 MG
16 TABLET ORAL
Status: DISCONTINUED | OUTPATIENT
Start: 2024-12-20 | End: 2024-12-21 | Stop reason: HOSPADM

## 2024-12-20 RX ORDER — SODIUM CHLORIDE 0.9 % (FLUSH) 0.9 %
10 SYRINGE (ML) INJECTION EVERY 12 HOURS PRN
Status: DISCONTINUED | OUTPATIENT
Start: 2024-12-20 | End: 2024-12-21 | Stop reason: HOSPADM

## 2024-12-20 RX ADMIN — CEFEPIME 1 G: 1 INJECTION, POWDER, FOR SOLUTION INTRAMUSCULAR; INTRAVENOUS at 11:12

## 2024-12-20 RX ADMIN — VANCOMYCIN HYDROCHLORIDE 1250 MG: 1.25 INJECTION, POWDER, LYOPHILIZED, FOR SOLUTION INTRAVENOUS at 12:12

## 2024-12-20 RX ADMIN — SODIUM CHLORIDE 1000 ML: 0.9 INJECTION, SOLUTION INTRAVENOUS at 11:12

## 2024-12-20 RX ADMIN — SODIUM CHLORIDE 500 ML: 9 INJECTION, SOLUTION INTRAVENOUS at 01:12

## 2024-12-20 RX ADMIN — CEFEPIME 2 G: 2 INJECTION, POWDER, FOR SOLUTION INTRAVENOUS at 07:12

## 2024-12-20 RX ADMIN — IOHEXOL 75 ML: 350 INJECTION, SOLUTION INTRAVENOUS at 12:12

## 2024-12-20 NOTE — TELEPHONE ENCOUNTER
Per BUD Hernandez last note 12/16, patient feeling fatigued and H&H trending down.  Patient refused blood transfusion at that time but received IVF with infusion.  Today RN reached out to [facility] and [facility] for possible blood transfusion as patient is feeling worse.  While waiting to hear back, daughter called and spoke to RN.  States since our location and [facility] do not have availability she will take her dad to the ED to be evaluated.  Notified NANCY BLOCK.

## 2024-12-20 NOTE — PROVIDER PROGRESS NOTES - EMERGENCY DEPT.
Encounter Date: 12/20/2024    ED Physician Progress Notes        ED Resident HAND-OFF NOTE:  Javier Downs is a 74 y.o. male who presented to the ED on 12/20/2024, patient C/O feeling unwell and subjective fever. I assumed care of patient from off-going ED physician team patient pending repeat lactic acid and evaluation by Hematology/Oncology.    On my evaluation, Javier Downs appears well, hemodynamically stable and in NAD. Thus far, Javier Downs has received:  Medications   sodium chloride 0.9% flush 10 mL (has no administration in time range)   naloxone 0.4 mg/mL injection 0.02 mg (has no administration in time range)   glucose chewable tablet 16 g (has no administration in time range)   glucose chewable tablet 24 g (has no administration in time range)   dextrose 50% injection 12.5 g (has no administration in time range)   dextrose 50% injection 25 g (has no administration in time range)   glucagon (human recombinant) injection 1 mg (has no administration in time range)   melatonin tablet 6 mg (has no administration in time range)   ceFEPIme injection 2 g (2 g Intravenous Given 12/20/24 1930)   oxyCODONE immediate release tablet 5 mg (has no administration in time range)   pantoprazole EC tablet 40 mg (has no administration in time range)   sodium chloride 0.9% bolus 1,000 mL 1,000 mL (0 mLs Intravenous Stopped 12/20/24 1250)   vancomycin 1,250 mg in 0.9% NaCl 250 mL IVPB (admixture device) (0 mg Intravenous Stopped 12/20/24 1331)   ceFEPIme injection 1 g (1 g Intravenous Given 12/20/24 1154)   iohexoL (OMNIPAQUE 350) injection 75 mL (75 mLs Intravenous Given 12/20/24 1245)   sodium chloride 0.9% bolus 500 mL 500 mL (0 mLs Intravenous Stopped 12/20/24 1425)       /71 (BP Location: Right arm, Patient Position: Lying)   Pulse 99   Temp 97.3 °F (36.3 °C)   Resp 16   Wt 54 kg (119 lb)   SpO2 100%   BMI 18.64 kg/m²         Disposition: I anticipate patient will be  admitted.  ______________________  Gonsalo Gupta MD   Emergency Medicine Resident      UPDATE:   Plasma lactic acid 1.6 after fluid resuscitation.  Vitals heart rate 80, blood pressure 115/80 after resuscitation.  Discussed patient with oncology, who agreed to admit patient to their service.      :  Screening for cardiovascular condition  Hepatic cirrhosis, unspecified hepatic cirrhosis type, unspecified whether ascites present (Primary)  Superior mesenteric vein thrombosis  Sepsis, due to unspecified organism, unspecified whether acute organ dysfunction present  Septic shock  Pneumonia of right lung due to infectious organism, unspecified part of lung  Splenic infarct  Pleural effusion  Atelectasis

## 2024-12-20 NOTE — TELEPHONE ENCOUNTER
----- Message from Lor sent at 12/20/2024  8:15 AM CST -----  Regarding: Speak with provider  Contact: Carrie  Consult/Advisory     Name Of Caller:Carrie        Contact Preference:  Carrie Downs (Daughter)  [phone] (Mobile), requesting a call back.          Nature of call:Pt is calling to speak with provider, states that the pt is not feeling well his pressure has been a low with no energy, and was told to call if anything changes. Please advise.

## 2024-12-20 NOTE — ASSESSMENT & PLAN NOTE
Will hold home antihypertensive given hypotension on arrival to the ED.  -Hold Home Anti-hypertensive: Amlodipine 10 mg qd

## 2024-12-20 NOTE — ED TRIAGE NOTES
Javier Downs, a 74 y.o. male presents to the ED w/ complaint of dehydration, N/V, generalized weakness. Pt had chemo Monday.    Triage note:  Chief Complaint   Patient presents with    Dehydration     Hypotension at home, hx of liver cancer and actively receivng chemo, here for poss blood transfusions. Fatigued at home.     Review of patient's allergies indicates:  No Known Allergies  Past Medical History:   Diagnosis Date    MARIA A (acute kidney injury) 8/18/2022    Hypertension     Metastatic colon cancer to liver 4/22/2021

## 2024-12-20 NOTE — ASSESSMENT & PLAN NOTE
HgB at 9.1. Per chart noted down trending H/H with Hgb 7.9 on 12/16.    Plan:  -Monitor w CBC qd  -Transfuse if <7.

## 2024-12-20 NOTE — ASSESSMENT & PLAN NOTE
Hypotensive and hypovolemic at the ED, S/p 1.5 L of NS.     Plan:  - Will hold  off on maint. IVF given edema and Pleural effusion.

## 2024-12-20 NOTE — H&P
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 Follows with Dr. Schuster. Prompted to come to the ED by his physician due to down trending H/H. Does report  worsening malaise for the past 2 days. Symptoms started around his last chemotherapy.  Reports one episode of subjective fever, and one episode of diarrhea that resolved on its own. Upon further questioning, reports increased SOB for the past 2 weeks. Otherwise, patient has no other complains. Denies nausea, vomiting, headache, or abdominal pain. Has no history of neutropenic fever, or opportunistic infections.     At the ED, found to be tachycardic(122)  and hypotensive (88/57).  Labs showed leukocytosis(24.59), Hgb of 9.1, stable plt count (239), hypoalbuminemia (2.3). Chest CT bilateral pleural effusions R>L,  w/ concerns of atelectasis vs evolving consolidation of the LLL.  Received 1.5 L of fluids and one time dose of Vancomycin + Cefepime. Admitted for further work up and management.      Oncology Treatment Plan:   OP COLORECTAL FOLFIRI + BEVACIZUMAB Q2W    Medications:  Continuous Infusions:  Scheduled Meds:  PRN Meds:  Current Facility-Administered Medications:     dextrose 50%, 12.5 g, Intravenous, PRN    dextrose 50%, 25 g, Intravenous, PRN    glucagon (human recombinant), 1 mg, Intramuscular, PRN    glucose, 16 g, Oral, PRN    glucose, 24 g, Oral, PRN    melatonin, 6 mg, Oral, Nightly PRN    naloxone, 0.02 mg, Intravenous, PRN    sodium chloride 0.9%, 10 mL, Intravenous, Q12H PRN     Review of patient's allergies indicates:  No Known Allergies     Past Medical History:    Diagnosis Date    MARIA A (acute kidney injury) 2022    Hypertension     Metastatic colon cancer to liver 2021     Past Surgical History:   Procedure Laterality Date    COLONOSCOPY N/A 2021    Procedure: COLONOSCOPY with possible stent;  Surgeon: EDILMA Bonilla MD;  Location: [facility] ENDO (2ND FLR);  Service: Colon and Rectal;  Laterality: N/A;    COLONOSCOPY N/A 11/3/2023    Procedure: COLONOSCOPY;  Surgeon: EDILMA Bonilla MD;  Location: [facility] ENDO (4TH FLR);  Service: Endoscopy;  Laterality: N/A;  prep instructions sent to pt via portal  From: EDILMA Bonilla MD  Procedure: Colonoscopy  Diagnosis: Surveillance colonoscopy - Hx of colon cancer  Procedure Timin-12 weeks  Provider: Myself  [place] [facility] [place] [facility]  Additional Scheduling Information: No scheduling con    COLONOSCOPY N/A 7/10/2024    Procedure: COLONOSCOPY;  Surgeon: Tam Pantoja MD;  Location: [facility] ENDO (2ND FLR);  Service: Endoscopy;  Laterality: N/A;    DIAGNOSTIC LAPAROSCOPY N/A 2022    Procedure: LAPAROSCOPY, DIAGNOSTIC;  Surgeon: Adrian Rodriguez MD;  Location: [facility] OR 2ND FLR;  Service: General;  Laterality: N/A;    INSERTION OF TUNNELED CENTRAL VENOUS CATHETER (CVC) WITH SUBCUTANEOUS PORT N/A 5/3/2021    Procedure: [id number]-CATH;  Surgeon: Francisco Layton MD;  Location: [facility] OR 2ND FLR;  Service: Vascular;  Laterality: N/A;    OMENTECTOMY N/A 10/20/2022    Procedure: OMENTECTOMY;  Surgeon: EDILMA Bonilla MD;  Location: [facility] OR 2ND FLR;  Service: Colon and Rectal;  Laterality: N/A;    RIGHT HEMICOLECTOMY N/A 10/20/2022    Procedure: HEMICOLECTOMY, RIGHT, extended;  Surgeon: EDILMA Bonilla MD;  Location: [facility] OR 2ND FLR;  Service: Colon and Rectal;  Laterality: N/A;  Extended right hemicolectomy CONSENT IN AM     Family History       Problem Relation (Age of Onset)    Cancer Brother          Tobacco Use    Smoking status: Never    Smokeless tobacco: Never   Substance and Sexual Activity    Alcohol use: Not  Currently    Drug use: Never    Sexual activity: Not Currently     Partners: Female       Review of Systems   Constitutional:  Positive for appetite change and fever. Negative for activity change, chills, diaphoresis and fatigue.   HENT:  Negative for congestion, rhinorrhea and trouble swallowing.    Respiratory:  Positive for shortness of breath. Negative for cough and chest tightness.    Cardiovascular:  Positive for leg swelling. Negative for chest pain and palpitations.   Gastrointestinal:  Negative for abdominal distention, abdominal pain, blood in stool, nausea and vomiting.   Genitourinary:  Negative for difficulty urinating, flank pain and hematuria.   Musculoskeletal:  Negative for back pain and gait problem.   Neurological:  Negative for dizziness, numbness and headaches.   Psychiatric/Behavioral:  Negative for agitation, behavioral problems and confusion.      Objective:     Vital Signs (Most Recent):  Temp: 97.3 °F (36.3 °C) (12/20/24 1052)  Pulse: 92 (12/20/24 1532)  Resp: 20 (12/20/24 1532)  BP: 111/76 (12/20/24 1532)  SpO2: 98 % (12/20/24 1532) Vital Signs (24h Range):  Temp:  [97.3 °F (36.3 °C)] 97.3 °F (36.3 °C)  Pulse:  [] 92  Resp:  [16-20] 20  SpO2:  [96 %-99 %] 98 %  BP: ()/(57-76) 111/76     Weight: 54 kg (119 lb)  Body mass index is 18.64 kg/m².  Body surface area is 1.6 meters squared.      Intake/Output Summary (Last 24 hours) at 12/20/2024 1650  Last data filed at 12/20/2024 1425  Gross per 24 hour   Intake 1608.26 ml   Output --   Net 1608.26 ml        Physical Exam  Constitutional:       Appearance: He is ill-appearing.   HENT:      Head: Normocephalic.   Cardiovascular:      Rate and Rhythm: Normal rate and regular rhythm.      Heart sounds: No murmur heard.     No gallop.   Pulmonary:      Effort: Pulmonary effort is normal.      Breath sounds: Decreased breath sounds (RL) present.   Abdominal:      General: Abdomen is flat. There is no distension.      Tenderness: There is  no abdominal tenderness.   Musculoskeletal:      Cervical back: Normal range of motion.      Right lower leg: Edema present.      Left lower leg: Edema present.   Skin:     Capillary Refill: Capillary refill takes less than 2 seconds.   Neurological:      General: No focal deficit present.      Mental Status: He is alert and oriented to person, place, and time.          Significant Labs:   Recent Lab Results  (Last 5 results in the past 24 hours)        12/20/24  1535   12/20/24  1430   12/20/24  1339   12/20/24  1331   12/20/24  1139        Influenza A, Molecular       Negative         Influenza B, Molecular       Negative         Albumin     2.3           ALP     150           Allens Test N/A               ALT     26           Anion Gap     9           Aniso         Slight       Appearance, UA   Clear             AST     44           Basophil %         0.0       Bilirubin (UA)   Negative             BILIRUBIN TOTAL     0.9  Comment: For infants and newborns, interpretation of results should be based  on gestational age, weight and in agreement with clinical  observations.    Premature Infant recommended reference ranges:  Up to 24 hours.............<8.0 mg/dL  Up to 48 hours............<12.0 mg/dL  3-5 days..................<15.0 mg/dL  6-29 days.................<15.0 mg/dL             Site Other               BUN     12           Calcium     7.9           Chloride     107           CO2     23           Color, UA   Yellow             Creatinine     0.7           Differential Method         Manual       eGFR     >60.0           Eos %         1.0       Flu A & B Source       Nasal swab         Glucose     92           Glucose, UA   Negative             Gran %         97.0       Group & Rh         A POS       Hematocrit         30.6       Hemoglobin         9.1       Hypo         Occasional       Immature Grans (Abs)         CANCELED  Comment: Mild elevation in immature granulocytes is non specific and   can be seen  in a variety of conditions including stress response,   acute inflammation, trauma and pregnancy. Correlation with other   laboratory and clinical findings is essential.    Result canceled by the ancillary.         Immature Granulocytes         CANCELED  Comment: Result canceled by the ancillary.       INDIRECT RICHIE         NEG       Ketones, UA   Negative             Lactic Acid Level     1.6  Comment: Falsely low lactic acid results can be found in samples   containing >=13.0 mg/dL total bilirubin and/or >=3.5 mg/dL   direct bilirubin.             Leukocyte Esterase, UA   Negative             Lipase         11       Lymph %         1.0       Magnesium      1.7           MCH         26.1       MCHC         29.7       MCV         88       Mono %         1.0       MPV         11.2       NITRITE UA   Negative             nRBC         0       Blood, UA   Negative             Ovalocytes         Occasional       pH, UA   6.0             Phosphorus Level     2.9           Platelet Estimate         Appears normal       Platelet Count         239       POC Lactate 1.60               Poikilocytosis         Slight       Potassium     3.5           PROTEIN TOTAL     5.4           Protein, UA   Negative  Comment: Recommend a 24 hour urine protein or a urine   protein/creatinine ratio if globulin induced proteinuria is  clinically suspected.               RBC         3.48       RDW         21.2       Sample VENOUS               Sodium     139           Spec Grav UA   >1.030             Specimen Outdate         12/23/2024 23:59       Specimen UA   Urine, Clean Catch             Troponin I High Sensitivity         <3       Vacuolated Granulocytes         Present       WBC         24.59                              Diagnostic Results:  I have reviewed all pertinent imaging results/findings within the past 24 hours.  Assessment/Plan:     * SIRS (systemic inflammatory response syndrome)  73 y/o male w/ Transverse  CRC  w/ mets to  liver on C36 of FOLFIRI + BEVACIZUMAB . Follows w/ Dr. Schuster. Presented to the ED due to down trending H/H,  worsening malaise and SOB. Found to be tachycardic and hypotensive that resolved after fluids.  Physical exam only remarkable for decreased RL breath sounds and BLE pitting edema. CT w/ bilateral pleural effusion R>L and signs concerning for atelectasis vs pneumonia of LLL.  Leukocytosis noted on labs, which could indicate infectious process vs recent use of growth factors. Differentials include: Sepsis 2/2 pneumonia vs sever hypovolemia due to decreased intake 2/2 to malignancy.     Plan:  - Vital signs q4hr  - S/p 1.5 L of NS. Will hold  off on maint. IVF given edema and Pleural effusion.  - S/p Vancomycin x1  - Cont. Cefepime  - F/U BCX      Pleural effusion  New Pleural effusion R>L on CT scan, compared to CT scan of 10/11.     Plan:  - Consider consult to IR for possible thoracentesis    Anemia, chronic disease  HgB at 9.1. Per chart noted down trending H/H with Hgb 7.9 on 12/16.    Plan:  -Monitor w CBC qd  -Transfuse if <7.     Hypovolemia  Hypotensive and hypovolemic at the ED, S/p 1.5 L of NS.     Plan:  - Will hold  off on maint. IVF given edema and Pleural effusion.    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 Follows w/ Dr. Darby.         Hypertension  Will hold home antihypertensive given hypotension on arrival to the ED.  -Hold Home Anti-hypertensive: Amlodipine 10 mg qd        Reyna Ramsey MD  Intern/PGY-1  Hematology/Oncology  [facility] - Emergency Dept

## 2024-12-20 NOTE — SUBJECTIVE & OBJECTIVE
Oncology Treatment Plan:   OP COLORECTAL FOLFIRI + BEVACIZUMAB Q2W    Medications:  Continuous Infusions:  Scheduled Meds:  PRN Meds:  Current Facility-Administered Medications:     dextrose 50%, 12.5 g, Intravenous, PRN    dextrose 50%, 25 g, Intravenous, PRN    glucagon (human recombinant), 1 mg, Intramuscular, PRN    glucose, 16 g, Oral, PRN    glucose, 24 g, Oral, PRN    melatonin, 6 mg, Oral, Nightly PRN    naloxone, 0.02 mg, Intravenous, PRN    sodium chloride 0.9%, 10 mL, Intravenous, Q12H PRN     Review of patient's allergies indicates:  No Known Allergies     Past Medical History:   Diagnosis Date    MARIA A (acute kidney injury) 2022    Hypertension     Metastatic colon cancer to liver 2021     Past Surgical History:   Procedure Laterality Date    COLONOSCOPY N/A 2021    Procedure: COLONOSCOPY with possible stent;  Surgeon: EDILMA Bonilla MD;  Location: [facility] (2ND [facility]);  Service: Colon and Rectal;  Laterality: N/A;    COLONOSCOPY N/A 11/3/2023    Procedure: COLONOSCOPY;  Surgeon: EDILMA Bonilla MD;  Location: [facility] (4TH FLR);  Service: Endoscopy;  Laterality: N/A;  prep instructions sent to pt via portal  From: EDILMA Bonilla MD  Procedure: Colonoscopy  Diagnosis: Surveillance colonoscopy - Hx of colon cancer  Procedure Timin-12 weeks  Provider: Myself  Location: [address]  Additional Scheduling Information: No scheduling con    COLONOSCOPY N/A 7/10/2024    Procedure: COLONOSCOPY;  Surgeon: Tam Pantoja MD;  Location: [facility] (2ND FLR);  Service: Endoscopy;  Laterality: N/A;    DIAGNOSTIC LAPAROSCOPY N/A 2022    Procedure: LAPAROSCOPY, DIAGNOSTIC;  Surgeon: Adrian Rodriguez MD;  Location: [facility] OR [address] [place] [address];  Service: General;  Laterality: N/A;    INSERTION OF TUNNELED CENTRAL VENOUS CATHETER (CVC) WITH SUBCUTANEOUS PORT N/A 5/3/2021    Procedure: [id number];  Surgeon: Francisco Layton MD;  Location: [facility] OR [address] [place] [address];  Service: Vascular;  Laterality: N/A;     OMENTECTOMY N/A 10/20/2022    Procedure: OMENTECTOMY;  Surgeon: EDILMA Bonilla MD;  Location: [facility] OR 2ND FLR;  Service: Colon and Rectal;  Laterality: N/A;    RIGHT HEMICOLECTOMY N/A 10/20/2022    Procedure: HEMICOLECTOMY, RIGHT, extended;  Surgeon: EDILMA Bonilla MD;  Location: [facility] OR 2ND FLR;  Service: Colon and Rectal;  Laterality: N/A;  Extended right hemicolectomy CONSENT IN AM     Family History       Problem Relation (Age of Onset)    Cancer Brother          Tobacco Use    Smoking status: Never    Smokeless tobacco: Never   Substance and Sexual Activity    Alcohol use: Not Currently    Drug use: Never    Sexual activity: Not Currently     Partners: Female       Review of Systems   Constitutional:  Positive for appetite change and fever. Negative for activity change, chills, diaphoresis and fatigue.   HENT:  Negative for congestion, rhinorrhea and trouble swallowing.    Respiratory:  Positive for shortness of breath. Negative for cough and chest tightness.    Cardiovascular:  Positive for leg swelling. Negative for chest pain and palpitations.   Gastrointestinal:  Negative for abdominal distention, abdominal pain, blood in stool, nausea and vomiting.   Genitourinary:  Negative for difficulty urinating, flank pain and hematuria.   Musculoskeletal:  Negative for back pain and gait problem.   Neurological:  Negative for dizziness, numbness and headaches.   Psychiatric/Behavioral:  Negative for agitation, behavioral problems and confusion.      Objective:     Vital Signs (Most Recent):  Temp: 97.3 °F (36.3 °C) (12/20/24 1052)  Pulse: 92 (12/20/24 1532)  Resp: 20 (12/20/24 1532)  BP: 111/76 (12/20/24 1532)  SpO2: 98 % (12/20/24 1532) Vital Signs (24h Range):  Temp:  [97.3 °F (36.3 °C)] 97.3 °F (36.3 °C)  Pulse:  [] 92  Resp:  [16-20] 20  SpO2:  [96 %-99 %] 98 %  BP: ()/(57-76) 111/76     Weight: 54 kg (119 lb)  Body mass index is 18.64 kg/m².  Body surface area is 1.6 meters  squared.      Intake/Output Summary (Last 24 hours) at 12/20/2024 1650  Last data filed at 12/20/2024 1425  Gross per 24 hour   Intake 1608.26 ml   Output --   Net 1608.26 ml        Physical Exam  Constitutional:       Appearance: He is ill-appearing.   HENT:      Head: Normocephalic.   Cardiovascular:      Rate and Rhythm: Normal rate and regular rhythm.      Heart sounds: No murmur heard.     No gallop.   Pulmonary:      Effort: Pulmonary effort is normal.      Breath sounds: Decreased breath sounds (RL) present.   Abdominal:      General: Abdomen is flat. There is no distension.      Tenderness: There is no abdominal tenderness.   Musculoskeletal:      Cervical back: Normal range of motion.      Right lower leg: Edema present.      Left lower leg: Edema present.   Skin:     Capillary Refill: Capillary refill takes less than 2 seconds.   Neurological:      General: No focal deficit present.      Mental Status: He is alert and oriented to person, place, and time.          Significant Labs:   Recent Lab Results  (Last 5 results in the past 24 hours)        12/20/24  1535   12/20/24  1430   12/20/24  1339   12/20/24  1331   12/20/24  1139        Influenza A, Molecular       Negative         Influenza B, Molecular       Negative         Albumin     2.3           ALP     150           Allens Test N/A               ALT     26           Anion Gap     9           Aniso         Slight       Appearance, UA   Clear             AST     44           Basophil %         0.0       Bilirubin (UA)   Negative             BILIRUBIN TOTAL     0.9  Comment: For infants and newborns, interpretation of results should be based  on gestational age, weight and in agreement with clinical  observations.    Premature Infant recommended reference ranges:  Up to 24 hours.............<8.0 mg/dL  Up to 48 hours............<12.0 mg/dL  3-5 days..................<15.0 mg/dL  6-29 days.................<15.0 mg/dL             Site Other                BUN     12           Calcium     7.9           Chloride     107           CO2     23           Color, UA   Yellow             Creatinine     0.7           Differential Method         Manual       eGFR     >60.0           Eos %         1.0       Flu A & B Source       Nasal swab         Glucose     92           Glucose, UA   Negative             Gran %         97.0       Group & Rh         A POS       Hematocrit         30.6       Hemoglobin         9.1       Hypo         Occasional       Immature Grans (Abs)         CANCELED  Comment: Mild elevation in immature granulocytes is non specific and   can be seen in a variety of conditions including stress response,   acute inflammation, trauma and pregnancy. Correlation with other   laboratory and clinical findings is essential.    Result canceled by the ancillary.         Immature Granulocytes         CANCELED  Comment: Result canceled by the ancillary.       INDIRECT RICHIE         NEG       Ketones, UA   Negative             Lactic Acid Level     1.6  Comment: Falsely low lactic acid results can be found in samples   containing >=13.0 mg/dL total bilirubin and/or >=3.5 mg/dL   direct bilirubin.             Leukocyte Esterase, UA   Negative             Lipase         11       Lymph %         1.0       Magnesium      1.7           MCH         26.1       MCHC         29.7       MCV         88       Mono %         1.0       MPV         11.2       NITRITE UA   Negative             nRBC         0       Blood, UA   Negative             Ovalocytes         Occasional       pH, UA   6.0             Phosphorus Level     2.9           Platelet Estimate         Appears normal       Platelet Count         239       POC Lactate 1.60               Poikilocytosis         Slight       Potassium     3.5           PROTEIN TOTAL     5.4           Protein, UA   Negative  Comment: Recommend a 24 hour urine protein or a urine   protein/creatinine ratio if globulin induced proteinuria  is  clinically suspected.               RBC         3.48       RDW         21.2       Sample VENOUS               Sodium     139           Spec Grav UA   >1.030             Specimen Outdate         12/23/2024 23:59       Specimen UA   Urine, Clean Catch             Troponin I High Sensitivity         <3       Vacuolated Granulocytes         Present       WBC         24.59                              Diagnostic Results:  I have reviewed all pertinent imaging results/findings within the past 24 hours.

## 2024-12-20 NOTE — ASSESSMENT & PLAN NOTE
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 Follows w/ Dr. Schuster. Presented to the ED due to down trending H/H,  worsening malaise and SOB. Found to be tachycardic and hypotensive that resolved after fluids.  Physical exam only remarkable for decreased RL breath sounds and BLE pitting edema. CT w/ bilateral pleural effusion R>L and signs concerning for atelectasis vs pneumonia of LLL.  Leukocytosis noted on labs, which could indicate infectious process vs recent use of growth factors. Differentials include: Sepsis 2/2 pneumonia vs sever hypovolemia due to decreased intake 2/2 to malignancy.     Plan:  - Vital signs q4hr  - S/p 1.5 L of NS. Will hold  off on maint. IVF given edema and Pleural effusion.  - S/p Vancomycin x1  - Cont. Cefepime

## 2024-12-20 NOTE — ED PROVIDER NOTES
Encounter Date: 2024       History     Chief Complaint   Patient presents with    Dehydration     Hypotension at home, hx of liver cancer and actively receivng chemo, here for poss blood transfusions. Fatigued at home.     HPI  74-year-old man currently on chemotherapy for metastatic colon cancer to the liver presents to the emergency department for not feeling well for the past 2 days after his last chemotherapy regimen, since that time, patient has been steadily feeling worse, believes he has a fever today, no specific complaints other than feeling fatigued no headache, no nausea vomiting no diarrhea thinks he may have low blood counts.  Patient has no history of neutropenic fever, meningitis, opportunistic infection, HIV infection, and isn't having diarrhea.    Denies chest pain.    Review of patient's allergies indicates:  No Known Allergies  Past Medical History:   Diagnosis Date    MARIA A (acute kidney injury) 2022    Hypertension     Metastatic colon cancer to liver 2021     Past Surgical History:   Procedure Laterality Date    COLONOSCOPY N/A 2021    Procedure: COLONOSCOPY with possible stent;  Surgeon: EDILMA Bonilla MD;  Location: [facility] (2ND FLR);  Service: Colon and Rectal;  Laterality: N/A;    COLONOSCOPY N/A 11/3/2023    Procedure: COLONOSCOPY;  Surgeon: EDILMA Bonilla MD;  Location: [facility] (4TH FLR);  Service: Endoscopy;  Laterality: N/A;  prep instructions sent to pt via portal  From: EDILMA Bonilla MD  Procedure: Colonoscopy  Diagnosis: Surveillance colonoscopy - Hx of colon cancer  Procedure Timin-12 weeks  Provider: Myself  Location: [address]  Additional Scheduling Information: No scheduling con    COLONOSCOPY N/A 7/10/2024    Procedure: COLONOSCOPY;  Surgeon: Tam Pantoja MD;  Location: [facility] (2ND FLR);  Service: Endoscopy;  Laterality: N/A;    DIAGNOSTIC LAPAROSCOPY N/A 2022    Procedure: LAPAROSCOPY, DIAGNOSTIC;  Surgeon: Adrian Rodriguez MD;   Location: [facility] OR 2ND FLR;  Service: General;  Laterality: N/A;    INSERTION OF TUNNELED CENTRAL VENOUS CATHETER (CVC) WITH SUBCUTANEOUS PORT N/A 5/3/2021    Procedure: [id number];  Surgeon: Francisco Layton MD;  Location: [facility] OR 2ND FLR;  Service: Vascular;  Laterality: N/A;    OMENTECTOMY N/A 10/20/2022    Procedure: OMENTECTOMY;  Surgeon: EDILMA Bonilla MD;  Location: [facility] OR 2ND FLR;  Service: Colon and Rectal;  Laterality: N/A;    RIGHT HEMICOLECTOMY N/A 10/20/2022    Procedure: HEMICOLECTOMY, RIGHT, extended;  Surgeon: EDILMA Bonilla MD;  Location: [facility] OR 2ND FLR;  Service: Colon and Rectal;  Laterality: N/A;  Extended right hemicolectomy CONSENT IN AM     Family History   Problem Relation Name Age of Onset    Cancer Brother       Social History     Tobacco Use    Smoking status: Never    Smokeless tobacco: Never   Substance Use Topics    Alcohol use: Not Currently    Drug use: Never     Review of Systems    Physical Exam     Initial Vitals   BP Pulse Resp Temp SpO2   12/20/24 1052 12/20/24 1052 12/20/24 1052 12/20/24 1052 12/20/24 1312   (!) 88/57 (!) 122 16 97.3 °F (36.3 °C) 96 %      MAP       --                Physical Exam    Nursing note and vitals reviewed.  Constitutional: He appears well-developed and well-nourished.   HENT:   Head: Normocephalic and atraumatic.   Eyes: EOM are normal.   Neck: Neck supple. No tracheal deviation present. No JVD present.   Normal range of motion.  Cardiovascular:  Normal heart sounds and intact distal pulses.     Exam reveals no gallop and no friction rub.       No murmur heard.  Pulmonary/Chest: Breath sounds normal. No stridor. No respiratory distress. He has no wheezes. He has no rales. He exhibits no tenderness.   Abdominal: Abdomen is soft. He exhibits no distension. There is no abdominal tenderness. There is no rebound.   Musculoskeletal:      Cervical back: Normal range of motion and neck supple.     Skin: Skin is warm. Capillary refill takes less  than 2 seconds.   Psychiatric: He has a normal mood and affect. His behavior is normal. Judgment and thought content normal.         ED Course   Procedures  Labs Reviewed   CBC W/ AUTO DIFFERENTIAL - Abnormal       Result Value    WBC 24.59 (*)     RBC 3.48 (*)     Hemoglobin 9.1 (*)     Hematocrit 30.6 (*)     MCV 88      MCH 26.1 (*)     MCHC 29.7 (*)     RDW 21.2 (*)     Platelets 239      MPV 11.2      Immature Granulocytes CANCELED      Immature Grans (Abs) CANCELED      nRBC 0      Gran % 97.0 (*)     Lymph % 1.0 (*)     Mono % 1.0 (*)     Eosinophil % 1.0      Basophil % 0.0      Platelet Estimate Appears normal      Aniso Slight      Poik Slight      Hypo Occasional      Ovalocytes Occasional      Vacuolated Granulocytes Present      Differential Method Manual     COMPREHENSIVE METABOLIC PANEL - Abnormal    Sodium 139      Potassium 3.5      Chloride 107      CO2 23      Glucose 92      BUN 12      Creatinine 0.7      Calcium 7.9 (*)     Total Protein 5.4 (*)     Albumin 2.3 (*)     Total Bilirubin 0.9      Alkaline Phosphatase 150      AST 44 (*)     ALT 26      eGFR >60.0      Anion Gap 9     ISTAT LACTATE - Abnormal    POC Lactate 4.59 (*)     Sample VENOUS      Site Other      Allens Test N/A     INFLUENZA A & B BY MOLECULAR    Influenza A, Molecular Negative      Influenza B, Molecular Negative      Flu A & B Source Nasal swab     CULTURE, BLOOD   CULTURE, BLOOD   LIPASE    Lipase 11     TROPONIN I HIGH SENSITIVITY    Troponin I High Sensitivity <3     LACTIC ACID, PLASMA    Lactate (Lactic Acid) 1.6     MAGNESIUM    Magnesium 1.7     PHOSPHORUS    Phosphorus 2.9     URINALYSIS, REFLEX TO URINE CULTURE   TYPE & SCREEN    Group & Rh A POS      Indirect Yolie NEG      Specimen Outdate 12/23/2024 23:59            Imaging Results               CT Chest Abdomen Pelvis With IV Contrast (XPD) NO Oral Contrast (Final result)  Result time 12/20/24 13:41:39      Final result by Sergio Thompson MD (12/20/24  13:41:39)                   Impression:      Small peripheral area of non enhancement in the spleen concerning for infarct.    Bilateral pleural effusions right greater than left with adjacent atelectasis.  Left lung base opacities which could be atelectasis or developing consolidation.    Liver cirrhosis and multiple liver lesions with post treatment related changes in the right hepatic lobe.  These findings are better assessed on recent MRI.    Chronic SMV and splenic vein thrombosis.  Possible thrombosis of the right portal vein as before.    Additional findings as described in the body of the report.    This report was flagged in Epic as abnormal.    Electronically signed by resident: Fidencio Palencia  Date:    12/20/2024  Time:    13:06    Electronically signed by: Sergio Thompson MD  Date:    12/20/2024  Time:    13:41               Narrative:    EXAMINATION:  CT CHEST ABDOMEN PELVIS WITH IV CONTRAST (XPD)    CLINICAL HISTORY:  Sepsis;    TECHNIQUE:  Low dose axial images, sagittal and coronal reformations were obtained from the thoracic inlet to the pubic symphysis following the IV administration of 75 mL of Omnipaque 350 intravenous contrast. Oral contrast was not administered.    COMPARISON:  MRI abdomen 12/13/2024 CT chest abdomen pelvis 10 4.    FINDINGS:  CHEST:    Neck and thoracic soft tissues: No supraclavicular or axillary adenopathy.    Vasculature: Left sided aortic arch.  Common origin of the right brachiocephalic and left common carotid arteries.  Thoracic aorta is nonaneurysmal. No significant atherosclerosis of the thoracic aorta.  Main pulmonary artery is normal in size without central filling defect.    Heart: Normal size. No signal atherosclerosis of the coronary arteries. Small volume pericardial fluid which may be physiologic.    Airways/Lungs/Pleura: Large central airways are clear.  Moderate right and trace left pleural effusions.  Passive and rounded atelectasis in the right lower lobe.   Opacities in the left lung base which could also be atelectasis or developing mild airspace consolidation.  No pneumothorax.    Hilum/Mediastinum: No hilar or mediastinal lymph node enlargement.    Esophagus: No significant abnormality.    ABDOMEN/PELVIS:    Hepatobiliary: Cirrhotic liver morphology.  Large area of hypoattenuation with curvilinear internal calcifications in the right hepatic lobe corresponding to area of prior radioembolization.  Additional hypodense lesions in the left hepatic lobe are similar to recent MRI.  No new hepatic lesion.  No calcified gallstones. No pericholecystic inflammatory changes. No intrahepatic or extrahepatic biliary ductal dilatation.    Spleen: Upper limit of normal in size.  Splenunculus noted.  Peripheral wedge-shaped area of non enhancement, mildly enlarged from recent MRI and concerning for infarction.    Pancreas: No mass. No peripancreatic fat stranding.    Adrenals: No significant abnormality    Renal/Ureters: The kidneys are normal in size and enhancement.  Subcentimeter hypodensities that are too small to adequately characterize.  No hydroureteronephrosis. The urinary bladder is unremarkable.    Reproductive: Prostatomegaly, unchanged.    Stomach/Bowel: No significant abnormality of the stomach.  Postoperative change of right hemicolectomy.  Bowel is normal caliber without obstruction.  Colonic diverticulosis without evidence of diverticulitis.    Peritoneum: Moderate volume abdominopelvic ascites similar to recent MRI .  No intraperitoneal free air.    Lymph Nodes: No pathologic pily enlargement in the abdomen or pelvis.    Vasculature: Abdominal aorta tapers normally.  No significant atherosclerosis of the abdominal aorta.  Major branch vessels of the aorta are patent..  The main and left portal vein opacify normally.  Right branches of the portal vein are not well visualized and may be thrombosed, similar to prior.  There is unchanged thrombosis of the SMV (series  3, image 46) and the splenic vein.  Prominent upper abdominal and gina hepatis collateral vessels.    Bones: No acute fractures or osseous destructive lesions.    Soft Tissues: No significant abnormality.                                       X-Ray Chest AP Portable (Final result)  Result time 12/20/24 11:57:35      Final result by Abhi Villa MD (12/20/24 11:57:35)                   Impression:      Right-sided pleural effusion.      Electronically signed by: Abhi Villa MD  Date:    12/20/2024  Time:    11:57               Narrative:    EXAMINATION:  XR CHEST AP PORTABLE    CLINICAL HISTORY:  Sepsis;    TECHNIQUE:  Single frontal view of the chest was performed.    COMPARISON:  No 10/11/2024 CT chest ne    FINDINGS:  Right-sided central venous catheter in the SVC.  Heart is not enlarged.  Ground-glass appearance of the right lower lung fields consistent with pleural effusion associated volume loss.  Small subsegmental atelectatic changes noted at the left lung base.  The upper lung fields are clear.                                       Medications   sodium chloride 0.9% bolus 1,000 mL 1,000 mL (0 mLs Intravenous Stopped 12/20/24 1250)   vancomycin 1,250 mg in 0.9% NaCl 250 mL IVPB (admixture device) (0 mg Intravenous Stopped 12/20/24 1331)   ceFEPIme injection 1 g (1 g Intravenous Given 12/20/24 1154)   iohexoL (OMNIPAQUE 350) injection 75 mL (75 mLs Intravenous Given 12/20/24 1245)   sodium chloride 0.9% bolus 500 mL 500 mL (0 mLs Intravenous Stopped 12/20/24 1425)     Medical Decision Making  Amount and/or Complexity of Data Reviewed  Labs: ordered.  Radiology: ordered.    Risk  Prescription drug management.      74-year-old male active chemo presents for tachycardia hypotension.    Initial differential diagnosis in this patient includes but isn't limited to neutropenic fever, pancytopenia, symptomatic anemia, sepsis, pneumonia.    Sepsis criteria was met, sepsis order set was initiated, patient began  immediately on fluid bolus due to hypotension and tachycardia, initial lactate was incredibly high at 4, patient is septic and is in septic shock, patient is responsive to fluids 30 makes per kg after that amount of fluids patient was no longer hypotensive or tachycardic during the fluid administration patient received IV antibiotics vancomycin and cefepime cefepime was chosen over Zosyn due to the fact that at that time we did not know if the patient was neutropenic as the labs had yet to result, so cefepime was ordered, and has been shown to be non inferior to Zosyn for severe sepsis.     Labs notable for leukocytosis, mild anemia at baseline, does not require transfusion at this time, cultures were drawn have not resulted, CTA chest abdomen and pelvis demonstrates.  CT demonstrates a number of incidental findings Pleural effusions on the left, atelectasis right lower lobe likely pneumonia, cirrhosis, possible infarct of the spleen, small thrombosis of superior mesenteric vein and the splenic vein which is similar to prior imaging, patient informed of these findings and instructed to follow up with primary care doctor.    EKG independently interpreted no STEMI, this is a poor quality EKG however patient does not have chest pain of any kind, sinus tachycardia.  No QT prolongation.  Leads V2 and V5 are uninterpretable on this EKG.    This patient does have evidence of infective focus  My overall impression is septic shock due to lactate > 4.  Source: Respiratory  Antibiotics given-   Antibiotics (72h ago, onward)      None          Latest lactate reviewed-  Recent Labs   Lab 12/20/24  1133 12/20/24  1339   LACTATE  --  1.6   POCLAC 4.59*  --      Organ dysfunction indicated by  elevated lactate    Fluid challenge Ideal Body Weight- The patient's ideal body weight is Ideal body weight: 66.1 kg (145 lb 11.6 oz) which will be used to calculate fluid bolus of 30 ml/kg for treatment of septic shock.      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

## 2024-12-20 NOTE — HPI
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 Follows with Dr. Schuster. Prompted to come to the ED by his physician due to down trending H/H. Does report  worsening malaise for the past 2 days. Symptoms started around his last chemotherapy.  Reports one episode of subjective fever, and one episode of diarrhea that resolved on its own. Upon further questioning, reports increased SOB for the past 2 weeks. Otherwise, patient has no other complains. Denies nausea, vomiting, headache, or abdominal pain. Has no history of neutropenic fever, or opportunistic infections.     At the ED, found to be tachycardic(122)  and hypotensive (88/57).  Labs showed leukocytosis(24.59), Hgb of 9.1, stable plt count (239), hypoalbuminemia (2.3). Chest CT bilateral pleural effusions R>L,  w/ concerns of atelectasis vs evolving consolidation of the LLL.  Received 1.5 L of fluids and one time dose of Vancomycin + Cefepime. Admitted for further work up and management.

## 2024-12-20 NOTE — ASSESSMENT & PLAN NOTE
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 Follows w/ Dr. Darby.

## 2024-12-20 NOTE — ASSESSMENT & PLAN NOTE
New Pleural effusion R>L on CT scan, compared to CT scan of 10/11.     Plan:  - Consider consult to IR for possible thoracentesis

## 2024-12-21 VITALS
WEIGHT: 120.81 LBS | HEART RATE: 92 BPM | BODY MASS INDEX: 18.96 KG/M2 | HEIGHT: 67 IN | TEMPERATURE: 98 F | DIASTOLIC BLOOD PRESSURE: 67 MMHG | SYSTOLIC BLOOD PRESSURE: 102 MMHG | OXYGEN SATURATION: 92 % | RESPIRATION RATE: 16 BRPM

## 2024-12-21 LAB
ALBUMIN SERPL BCP-MCNC: 2 G/DL (ref 3.5–5.2)
ALP SERPL-CCNC: 154 U/L (ref 40–150)
ALT SERPL W/O P-5'-P-CCNC: 21 U/L (ref 10–44)
ANION GAP SERPL CALC-SCNC: 9 MMOL/L (ref 8–16)
ANISOCYTOSIS BLD QL SMEAR: SLIGHT
AST SERPL-CCNC: 41 U/L (ref 10–40)
BASOPHILS NFR BLD: 0 % (ref 0–1.9)
BILIRUB SERPL-MCNC: 0.8 MG/DL (ref 0.1–1)
BLD PROD TYP BPU: NORMAL
BLOOD UNIT EXPIRATION DATE: NORMAL
BLOOD UNIT TYPE CODE: 6200
BLOOD UNIT TYPE: NORMAL
BUN SERPL-MCNC: 13 MG/DL (ref 8–23)
CALCIUM SERPL-MCNC: 8.1 MG/DL (ref 8.7–10.5)
CHLORIDE SERPL-SCNC: 108 MMOL/L (ref 95–110)
CO2 SERPL-SCNC: 22 MMOL/L (ref 23–29)
CODING SYSTEM: NORMAL
CREAT SERPL-MCNC: 0.7 MG/DL (ref 0.5–1.4)
CROSSMATCH INTERPRETATION: NORMAL
DACRYOCYTES BLD QL SMEAR: ABNORMAL
DIFFERENTIAL METHOD BLD: ABNORMAL
DISPENSE STATUS: NORMAL
DOHLE BOD BLD QL SMEAR: PRESENT
EOSINOPHIL NFR BLD: 0 % (ref 0–8)
ERYTHROCYTE [DISTWIDTH] IN BLOOD BY AUTOMATED COUNT: 20.9 % (ref 11.5–14.5)
EST. GFR  (NO RACE VARIABLE): >60 ML/MIN/1.73 M^2
GLUCOSE SERPL-MCNC: 91 MG/DL (ref 70–110)
HCT VFR BLD AUTO: 25.1 % (ref 40–54)
HGB BLD-MCNC: 7.2 G/DL (ref 14–18)
HYPOCHROMIA BLD QL SMEAR: ABNORMAL
IMM GRANULOCYTES # BLD AUTO: ABNORMAL K/UL (ref 0–0.04)
IMM GRANULOCYTES NFR BLD AUTO: ABNORMAL % (ref 0–0.5)
INR PPP: 1.2 (ref 0.8–1.2)
LYMPHOCYTES NFR BLD: 2 % (ref 18–48)
MAGNESIUM SERPL-MCNC: 1.7 MG/DL (ref 1.6–2.6)
MCH RBC QN AUTO: 25.9 PG (ref 27–31)
MCHC RBC AUTO-ENTMCNC: 28.7 G/DL (ref 32–36)
MCV RBC AUTO: 90 FL (ref 82–98)
MONOCYTES NFR BLD: 0 % (ref 4–15)
NEUTROPHILS NFR BLD: 95 % (ref 38–73)
NEUTS BAND NFR BLD MANUAL: 3 %
NRBC BLD-RTO: 0 /100 WBC
PHOSPHATE SERPL-MCNC: 2.4 MG/DL (ref 2.7–4.5)
PLATELET # BLD AUTO: 163 K/UL (ref 150–450)
PLATELET BLD QL SMEAR: ABNORMAL
PMV BLD AUTO: 10.8 FL (ref 9.2–12.9)
POIKILOCYTOSIS BLD QL SMEAR: SLIGHT
POLYCHROMASIA BLD QL SMEAR: ABNORMAL
POTASSIUM SERPL-SCNC: 3.5 MMOL/L (ref 3.5–5.1)
PROT SERPL-MCNC: 4.6 G/DL (ref 6–8.4)
PROTHROMBIN TIME: 13.2 SEC (ref 9–12.5)
RBC # BLD AUTO: 2.78 M/UL (ref 4.6–6.2)
SODIUM SERPL-SCNC: 139 MMOL/L (ref 136–145)
TOXIC GRANULES BLD QL SMEAR: PRESENT
TRANS ERYTHROCYTES VOL PATIENT: NORMAL ML
WBC # BLD AUTO: 5.95 K/UL (ref 3.9–12.7)
WBC TOXIC VACUOLES BLD QL SMEAR: PRESENT

## 2024-12-21 PROCEDURE — 86920 COMPATIBILITY TEST SPIN: CPT

## 2024-12-21 PROCEDURE — 85027 COMPLETE CBC AUTOMATED: CPT

## 2024-12-21 PROCEDURE — 83735 ASSAY OF MAGNESIUM: CPT

## 2024-12-21 PROCEDURE — 96374 THER/PROPH/DIAG INJ IV PUSH: CPT | Mod: 59

## 2024-12-21 PROCEDURE — 25000003 PHARM REV CODE 250

## 2024-12-21 PROCEDURE — 80053 COMPREHEN METABOLIC PANEL: CPT

## 2024-12-21 PROCEDURE — 36430 TRANSFUSION BLD/BLD COMPNT: CPT | Mod: HCNC

## 2024-12-21 PROCEDURE — G0378 HOSPITAL OBSERVATION PER HR: HCPCS | Mod: HCNC

## 2024-12-21 PROCEDURE — P9021 RED BLOOD CELLS UNIT: HCPCS

## 2024-12-21 PROCEDURE — 85610 PROTHROMBIN TIME: CPT

## 2024-12-21 PROCEDURE — 84100 ASSAY OF PHOSPHORUS: CPT

## 2024-12-21 PROCEDURE — 36415 COLL VENOUS BLD VENIPUNCTURE: CPT

## 2024-12-21 PROCEDURE — 63600175 PHARM REV CODE 636 W HCPCS

## 2024-12-21 PROCEDURE — 94761 N-INVAS EAR/PLS OXIMETRY MLT: CPT

## 2024-12-21 PROCEDURE — 96376 TX/PRO/DX INJ SAME DRUG ADON: CPT

## 2024-12-21 PROCEDURE — 85007 BL SMEAR W/DIFF WBC COUNT: CPT

## 2024-12-21 PROCEDURE — 1111F DSCHRG MED/CURRENT MED MERGE: CPT | Mod: CPTII,GC,, | Performed by: INTERNAL MEDICINE

## 2024-12-21 PROCEDURE — 99239 HOSP IP/OBS DSCHRG MGMT >30: CPT | Mod: GC,,, | Performed by: INTERNAL MEDICINE

## 2024-12-21 RX ORDER — HYDROCODONE BITARTRATE AND ACETAMINOPHEN 500; 5 MG/1; MG/1
TABLET ORAL
Status: DISCONTINUED | OUTPATIENT
Start: 2024-12-21 | End: 2024-12-21 | Stop reason: HOSPADM

## 2024-12-21 RX ADMIN — CEFEPIME 2 G: 2 INJECTION, POWDER, FOR SOLUTION INTRAVENOUS at 03:12

## 2024-12-21 RX ADMIN — PANTOPRAZOLE SODIUM 40 MG: 40 TABLET, DELAYED RELEASE ORAL at 08:12

## 2024-12-21 RX ADMIN — CEFEPIME 2 G: 2 INJECTION, POWDER, FOR SOLUTION INTRAVENOUS at 10:12

## 2024-12-21 NOTE — SUBJECTIVE & OBJECTIVE
Interval History: Mild tachycardia overnight. Otherwise, HD stable. No complains this am.     Oncology Treatment Plan:   OP COLORECTAL FOLFIRI + BEVACIZUMAB Q2W    Medications:  Continuous Infusions:  Scheduled Meds:   ceFEPime IV (PEDS and ADULTS)  2 g Intravenous Q8H    pantoprazole  40 mg Oral Daily     PRN Meds:  Current Facility-Administered Medications:     dextrose 50%, 12.5 g, Intravenous, PRN    dextrose 50%, 25 g, Intravenous, PRN    glucagon (human recombinant), 1 mg, Intramuscular, PRN    glucose, 16 g, Oral, PRN    glucose, 24 g, Oral, PRN    melatonin, 6 mg, Oral, Nightly PRN    naloxone, 0.02 mg, Intravenous, PRN    oxyCODONE, 5 mg, Oral, Q6H PRN    sodium chloride 0.9%, 10 mL, Intravenous, Q12H PRN     Review of Systems  Objective:     Vital Signs (Most Recent):  Temp: 99.4 °F (37.4 °C) (12/21/24 0755)  Pulse: 101 (12/21/24 0755)  Resp: 18 (12/21/24 0755)  BP: 97/62 (12/21/24 0755)  SpO2: 97 % (12/21/24 0755) Vital Signs (24h Range):  Temp:  [97.3 °F (36.3 °C)-99.4 °F (37.4 °C)] 99.4 °F (37.4 °C)  Pulse:  [] 101  Resp:  [14-20] 18  SpO2:  [96 %-100 %] 97 %  BP: ()/(57-80) 97/62     Weight: 54.8 kg (120 lb 13 oz)  Body mass index is 18.92 kg/m².  Body surface area is 1.61 meters squared.      Intake/Output Summary (Last 24 hours) at 12/21/2024 1040  Last data filed at 12/21/2024 0612  Gross per 24 hour   Intake 2286.26 ml   Output 200 ml   Net 2086.26 ml        Physical Exam  Constitutional:       Appearance: He is ill-appearing.   HENT:      Head: Normocephalic.   Cardiovascular:      Rate and Rhythm: Normal rate and regular rhythm.      Heart sounds: No murmur heard.     No gallop.   Pulmonary:      Effort: Pulmonary effort is normal.      Breath sounds: Decreased breath sounds (RL) present.   Abdominal:      General: Abdomen is flat. There is no distension.      Tenderness: There is no abdominal tenderness.   Musculoskeletal:      Cervical back: Normal range of motion.      Right lower  leg: Edema (MIld) present.      Left lower leg: Edema (MIld) present.   Skin:     Capillary Refill: Capillary refill takes less than 2 seconds.   Neurological:      General: No focal deficit present.      Mental Status: He is alert and oriented to person, place, and time.          Significant Labs:   CBC:   Recent Labs   Lab 12/20/24  1139 12/21/24  0742   WBC 24.59* 5.95   HGB 9.1* 7.2*   HCT 30.6* 25.1*    163   , CMP:   Recent Labs   Lab 12/20/24  1339 12/21/24  0742    139   K 3.5 3.5    108   CO2 23 22*   GLU 92 91   BUN 12 13   CREATININE 0.7 0.7   CALCIUM 7.9* 8.1*   PROT 5.4* 4.6*   ALBUMIN 2.3* 2.0*   BILITOT 0.9 0.8   ALKPHOS 150 154*   AST 44* 41*   ALT 26 21   ANIONGAP 9 9   , Coagulation:   Recent Labs   Lab 12/21/24  0742   INR 1.2   , and LFTs:   Recent Labs   Lab 12/20/24  1339 12/21/24  0742   ALT 26 21   AST 44* 41*   ALKPHOS 150 154*   BILITOT 0.9 0.8   PROT 5.4* 4.6*   ALBUMIN 2.3* 2.0*       Diagnostic Results:  I have reviewed all pertinent imaging results/findings within the past 24 hours.

## 2024-12-21 NOTE — ASSESSMENT & PLAN NOTE
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 Follows w/ Dr. Darby.

## 2024-12-21 NOTE — HOSPITAL COURSE
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 Follows with Dr. Schuster. Admitted to our services due to hypovolemia and dehydration.Received 1.5 L of NS with improvement on BP and HR. BCX NGTD. On Cefepime. However, low suspicion for infection given clinical stability and remarkably decreased WBC on 12/21.  CT C/A/P concerning for bilateral pleural effusions, R>>L.  Consulted pulmonology for possible thoracentesis, however, on U/S effusion was not big enough that required tap. Will be send home with CXR in 2 weeks to follow progression of effusion. Next schedule visit with Oncologist is on Jan 6.  At clinic, there were concerns for down trending H/H. This am was 7.2, received 1 unit of PRBC. Once done, able to discharge home given clinical stability. Patient educated about the importance of following up with his PCP and Oncologist. Instructed to come back to the hospital if symptoms become worse or if deemed necessary. Plan discussed with patient at bedside, who verbalized understanding and agreed to it.

## 2024-12-21 NOTE — ASSESSMENT & PLAN NOTE
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 Follows w/ Dr. Schuster. Presented to the ED due to down trending H/H,  worsening malaise and SOB. Found to be tachycardic and hypotensive that resolved after fluids.  Physical exam only remarkable for decreased RL breath sounds and BLE pitting edema. CT w/ bilateral pleural effusion R>L and signs concerning for atelectasis vs pneumonia of LLL.  Leukocytosis noted on labs, which could indicate infectious process vs recent use of growth factors. Differentials include: Sepsis 2/2 pneumonia vs sever hypovolemia due to decreased intake 2/2 to malignancy.     Plan:  - Vital signs q4hr  - S/p 1.5 L of NS. Will hold  off on maint. IVF given edema and Pleural effusion.  - S/p Vancomycin x1  - Cont. Cefepime  - F/U BCX

## 2024-12-21 NOTE — PLAN OF CARE
Patient A&O x 4 . Remained afebrile . SpO2 maintains in room air . Plan of care reviewed with patient . One unit of blood transfused , tolerated well .No bowel movement this shift . Ambulates independently . Bed in low and locked position . Call light and personal items within a reach .

## 2024-12-21 NOTE — DISCHARGE SUMMARY
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 Follows with Dr. Schuster. Prompted to come to the ED by his physician due to down trending H/H. Does report  worsening malaise for the past 2 days. Symptoms started around his last chemotherapy.  Reports one episode of subjective fever, and one episode of diarrhea that resolved on its own. Upon further questioning, reports increased SOB for the past 2 weeks. Otherwise, patient has no other complains. Denies nausea, vomiting, headache, or abdominal pain. Has no history of neutropenic fever, or opportunistic infections.     At the ED, found to be tachycardic(122)  and hypotensive (88/57).  Labs showed leukocytosis(24.59), Hgb of 9.1, stable plt count (239), hypoalbuminemia (2.3). Chest CT bilateral pleural effusions R>L,  w/ concerns of atelectasis vs evolving consolidation of the LLL.  Received 1.5 L of fluids and one time dose of Vancomycin + Cefepime. Admitted for further work up and management.         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 Follows with Dr. Schuster. Admitted to our services due to hypovolemia and dehydration.Received 1.5 L of NS with improvement on BP and HR. BCX NGTD. On Cefepime. However, low suspicion for infection given clinical stability and remarkably decreased WBC on 12/21.  CT C/A/P concerning for bilateral pleural effusions, R>>L.  Consulted pulmonology for possible thoracentesis, however, on U/S effusion was not big enough that required tap. Will be send home with CXR in  2 weeks to follow progression of effusion. Next schedule visit with Oncologist is on Jan 6.  At clinic, there were concerns for down trending H/H. This am was 7.2, received 1 unit of PRBC. Once done, able to discharge home given clinical stability. Patient educated about the importance of following up with his PCP and Oncologist. Instructed to come back to the hospital if symptoms become worse or if deemed necessary. Plan discussed with patient at bedside, who verbalized understanding and agreed to it.     Goals of Care Treatment Preferences:  Code Status: Full Code    Interval History: Mild tachycardia overnight. Otherwise, HD stable. No complains this am.     Oncology Treatment Plan:   OP COLORECTAL FOLFIRI + BEVACIZUMAB Q2W    Medications:  Continuous Infusions:  Scheduled Meds:   ceFEPime IV (PEDS and ADULTS)  2 g Intravenous Q8H    pantoprazole  40 mg Oral Daily     PRN Meds:  Current Facility-Administered Medications:     dextrose 50%, 12.5 g, Intravenous, PRN    dextrose 50%, 25 g, Intravenous, PRN    glucagon (human recombinant), 1 mg, Intramuscular, PRN    glucose, 16 g, Oral, PRN    glucose, 24 g, Oral, PRN    melatonin, 6 mg, Oral, Nightly PRN    naloxone, 0.02 mg, Intravenous, PRN    oxyCODONE, 5 mg, Oral, Q6H PRN    sodium chloride 0.9%, 10 mL, Intravenous, Q12H PRN     Review of Systems  Objective:     Vital Signs (Most Recent):  Temp: 99.4 °F (37.4 °C) (12/21/24 0755)  Pulse: 101 (12/21/24 0755)  Resp: 18 (12/21/24 0755)  BP: 97/62 (12/21/24 0755)  SpO2: 97 % (12/21/24 0755) Vital Signs (24h Range):  Temp:  [97.3 °F (36.3 °C)-99.4 °F (37.4 °C)] 99.4 °F (37.4 °C)  Pulse:  [] 101  Resp:  [14-20] 18  SpO2:  [96 %-100 %] 97 %  BP: ()/(57-80) 97/62     Weight: 54.8 kg (120 lb 13 oz)  Body mass index is 18.92 kg/m².  Body surface area is 1.61 meters squared.      Intake/Output Summary (Last 24 hours) at 12/21/2024 1040  Last data filed at 12/21/2024 0612  Gross per 24 hour   Intake 2286.26 ml    Output 200 ml   Net 2086.26 ml        Physical Exam  Constitutional:       Appearance: He is ill-appearing.   HENT:      Head: Normocephalic.   Cardiovascular:      Rate and Rhythm: Normal rate and regular rhythm.      Heart sounds: No murmur heard.     No gallop.   Pulmonary:      Effort: Pulmonary effort is normal.      Breath sounds: Decreased breath sounds (RL) present.   Abdominal:      General: Abdomen is flat. There is no distension.      Tenderness: There is no abdominal tenderness.   Musculoskeletal:      Cervical back: Normal range of motion.      Right lower leg: Edema (MIld) present.      Left lower leg: Edema (MIld) present.   Skin:     Capillary Refill: Capillary refill takes less than 2 seconds.   Neurological:      General: No focal deficit present.      Mental Status: He is alert and oriented to person, place, and time.          Significant Labs:   CBC:   Recent Labs   Lab 12/20/24  1139 12/21/24  0742   WBC 24.59* 5.95   HGB 9.1* 7.2*   HCT 30.6* 25.1*    163   , CMP:   Recent Labs   Lab 12/20/24  1339 12/21/24  0742    139   K 3.5 3.5    108   CO2 23 22*   GLU 92 91   BUN 12 13   CREATININE 0.7 0.7   CALCIUM 7.9* 8.1*   PROT 5.4* 4.6*   ALBUMIN 2.3* 2.0*   BILITOT 0.9 0.8   ALKPHOS 150 154*   AST 44* 41*   ALT 26 21   ANIONGAP 9 9   , Coagulation:   Recent Labs   Lab 12/21/24  0742   INR 1.2   , and LFTs:   Recent Labs   Lab 12/20/24  1339 12/21/24  0742   ALT 26 21   AST 44* 41*   ALKPHOS 150 154*   BILITOT 0.9 0.8   PROT 5.4* 4.6*   ALBUMIN 2.3* 2.0*       Diagnostic Results:  I have reviewed all pertinent imaging results/findings within the past 24 hours.     Consults:   Consults (From admission, onward)          Status Ordering Provider     Inpatient consult to Pulmonology  Once        Provider:  (Not yet assigned)    Acknowledged CAROLA WATSON            Significant Diagnostic Studies: Labs: All labs within the past 24 hours have been reviewed    Pending Diagnostic Studies:        None          Final Active Diagnoses:    Diagnosis Date Noted POA    PRINCIPAL PROBLEM:  SIRS (systemic inflammatory response syndrome) [R65.10] 12/20/2024 Yes    Hypovolemia [E86.1] 12/20/2024 Yes    Anemia, chronic disease [D63.8] 12/20/2024 Yes    Pleural effusion [J90] 12/20/2024 Yes    Metastatic colon cancer to liver [C18.9, C78.7] 04/22/2021 Yes    Hypertension [I10]  Yes      Problems Resolved During this Admission:    Diagnosis Date Noted Date Resolved POA    Leukocytosis [D72.829] 12/20/2024 12/20/2024 Yes      Discharged Condition: good    Disposition: Home or Self Care    Follow Up:    Patient Instructions:      X-Ray Chest PA And Lateral   Standing Status: Future Standing Exp. Date: 12/21/25     Order Specific Question Answer Comments   May the Radiologist modify the order per protocol to meet the clinical needs of the patient? Yes    Release to patient Immediate      Medications:  Reconciled Home Medications:      Medication List        CONTINUE taking these medications      acetaminophen 500 MG tablet  Commonly known as: TYLENOL  Take 1 tablet (500 mg total) by mouth every 6 (six) hours as needed for Pain (alternate with ibuprofen).     amLODIPine 10 MG tablet  Commonly known as: NORVASC  Take 1 tablet (10 mg total) by mouth once daily.     LIDOcaine-prilocaine cream  Commonly known as: EMLA  Apply topically as needed (port application).     ondansetron 4 MG tablet  Commonly known as: ZOFRAN  Take 1 tablet (4 mg total) by mouth every 8 (eight) hours as needed for Nausea.     oxyCODONE 5 MG immediate release tablet  Commonly known as: ROXICODONE  Take 1 tablet (5 mg total) by mouth every 6 (six) hours as needed for Pain.     pantoprazole 40 MG tablet  Commonly known as: PROTONIX  Take 1 tablet (40 mg total) by mouth 2 (two) times daily before meals.     tamsulosin 0.4 mg Cap  Commonly known as: FLOMAX  Take 1 capsule (0.4 mg total) by mouth once daily.     traMADoL 50 mg tablet  Commonly known as:  ULTRAM  Take 1 tablet (50 mg total) by mouth every 6 (six) hours as needed for Pain.              Reyna Ramsey MD  Intern/PGY-1  Hematology/Oncology  [facility] - Oncology ([facility])

## 2024-12-21 NOTE — NURSING
Pt admitted into room 857 from ED. Pt transported to unit via stretcher and ambulated to bed independently. Dr. Siobhan Tran notified of pt arrival.    Vitals and assessment as charted. 20G R wrist PIV and 20G LFA IV in place on arrival; dressing dry, clean, and intact. All skin dry, clean, and intact.    Upon arrival to room, patient oriented to room, floor, and call light. Bed locked and in lowest position. Call light within reach. Instructed to call for assistance.

## 2024-12-21 NOTE — CONSULTS
[facility] Consult Note    Primary Attending Physician: Sheyla Oliveros  Consultant Attending: Farooq Howard  Consultant Fellow: Laurie Victoria     Reason for Consult:     Right pleural effusion    Subjective:      History of Present Illness:  Javier Downs is a 74 y.o. man with a history of CRC with metastatic lesions to the liver on FOLFIRI and BEVACIZUMAB who presented from heme/onc clinic for acute on chronic anemia. He reports malaise, fatigue, and one month of progressively worsening dyspnea. He reports a productive cough with clear sputum for the last week. He denies any fevers, chills, nausea/vomiting. No sick contacts. He lives at home with his wife. Denies any dysphagia.     He was hypotensive and tachycardic on admission with leukocytosis of 24 and hemoglobin of 9. CT chest showed small to moderate right pleural effusion. He was given Vanc and Cefepime and 1.5L of IVF. He was admitted to the hematology and oncology service for further management. His heart rate and blood pressure improved with IVF. On 12/21 hemoglobin dropped to 7.2, given 1 unit pRBCs.    Regarding cancer history, he was diagnosed with metastatic colon cancer to the liver in 4/2021. He is on chemotherapy, underwent Y90 treatment of liver lesion in 11/2024.     Past Medical History:  Past Medical History:   Diagnosis Date    MARIA A (acute kidney injury) 8/18/2022    Hypertension     Metastatic colon cancer to liver 4/22/2021       Past Surgical History:  Past Surgical History:   Procedure Laterality Date    COLONOSCOPY N/A 4/20/2021    Procedure: COLONOSCOPY with possible stent;  Surgeon: EDILMA Bonilla MD;  Location: [facility] (2ND FLR);  Service: Colon and Rectal;  Laterality: N/A;    COLONOSCOPY N/A 11/3/2023    Procedure: COLONOSCOPY;  Surgeon: EDILMA Bonilla MD;  Location: [facility] (4TH FLR);  Service: Endoscopy;  Laterality: N/A;  prep instructions sent to pt via portal  From: EDILMA Bonilla,  MD  Procedure: Colonoscopy  Diagnosis: Surveillance colonoscopy - Hx of colon cancer  Procedure Timin-12 weeks  Provider: Myself  Location: [facility] 4-Endo  Additional Scheduling Information: No scheduling con    COLONOSCOPY N/A 7/10/2024    Procedure: COLONOSCOPY;  Surgeon: Tam Pantoja MD;  Location: [facility] (2ND FLR);  Service: Endoscopy;  Laterality: N/A;    DIAGNOSTIC LAPAROSCOPY N/A 2022    Procedure: LAPAROSCOPY, DIAGNOSTIC;  Surgeon: Adrian Rodriguez MD;  Location: [facility] OR 2ND FLR;  Service: General;  Laterality: N/A;    INSERTION OF TUNNELED CENTRAL VENOUS CATHETER (CVC) WITH SUBCUTANEOUS PORT N/A 5/3/2021    Procedure: [id number];  Surgeon: Francisco Layton MD;  Location: [facility] OR 2ND FLR;  Service: Vascular;  Laterality: N/A;    OMENTECTOMY N/A 10/20/2022    Procedure: OMENTECTOMY;  Surgeon: EDILMA Bonilla MD;  [facility];  Service: Colon and Rectal;  Laterality: N/A;    RIGHT HEMICOLECTOMY N/A 10/20/2022    Procedure: HEMICOLECTOMY, RIGHT, extended;  Surgeon: EDILMA Bonilla MD;  [facility];  Service: Colon and Rectal;  Laterality: N/A;  Extended right hemicolectomy CONSENT IN AM       Allergies:  Review of patient's allergies indicates:  No Known Allergies    Medications:   In-Hospital Scheduled Medications:   ceFEPime IV (PEDS and ADULTS)  2 g Intravenous Q8H    pantoprazole  40 mg Oral Daily      In-Hospital PRN Medications:    Current Facility-Administered Medications:     0.9%  NaCl infusion (for blood administration), , Intravenous, Q24H PRN    dextrose 50%, 12.5 g, Intravenous, PRN    dextrose 50%, 25 g, Intravenous, PRN    glucagon (human recombinant), 1 mg, Intramuscular, PRN    glucose, 16 g, Oral, PRN    glucose, 24 g, Oral, PRN    melatonin, 6 mg, Oral, Nightly PRN    naloxone, 0.02 mg, Intravenous, PRN    oxyCODONE, 5 mg, Oral, Q6H PRN    sodium chloride 0.9%, 10 mL, Intravenous, Q12H PRN   In-Hospital IV Infusion Medications:     Home  "Medications:  Prior to Admission medications    Medication Sig Start Date End Date Taking? Authorizing Provider   amLODIPine (NORVASC) 10 MG tablet Take 1 tablet (10 mg total) by mouth once daily. 24  Yes Anali Hernandez PA-C   pantoprazole (PROTONIX) 40 MG tablet Take 1 tablet (40 mg total) by mouth 2 (two) times daily before meals. 24 Yes Max Grijalva MD   tamsulosin (FLOMAX) 0.4 mg Cap Take 1 capsule (0.4 mg total) by mouth once daily. 24 Yes Anali Hernandez PA-C   acetaminophen (TYLENOL) 500 MG tablet Take 1 tablet (500 mg total) by mouth every 6 (six) hours as needed for Pain (alternate with ibuprofen). 5/3/21   FRANKLIN Delarosa MD   LIDOcaine-prilocaine (EMLA) cream Apply topically as needed (port application). 4/15/24   Anali Hernandez PA-C   ondansetron (ZOFRAN) 4 MG tablet Take 1 tablet (4 mg total) by mouth every 8 (eight) hours as needed for Nausea. 23   Thomas Monroe MD   oxyCODONE (ROXICODONE) 5 MG immediate release tablet Take 1 tablet (5 mg total) by mouth every 6 (six) hours as needed for Pain. 22   Elsy Kelly NP   traMADoL (ULTRAM) 50 mg tablet Take 1 tablet (50 mg total) by mouth every 6 (six) hours as needed for Pain. 24   Natividad Maher, CNS       Family History:  Family History   Problem Relation Name Age of Onset    Cancer Brother         Social History:  Social History     Tobacco Use    Smoking status: Never    Smokeless tobacco: Never   Substance Use Topics    Alcohol use: Not Currently    Drug use: Never       Review of Systems:  Pertinent items are noted in HPI. All other systems are reviewed and are negative.     Objective:   Last 24 Hour Vital Signs:  BP  Min: 97/62  Max: 119/70  Temp  Av.3 °F (36.8 °C)  Min: 97.3 °F (36.3 °C)  Max: 99.4 °F (37.4 °C)  Pulse  Av.9  Min: 80  Max: 108  Resp  Av.1  Min: 14  Max: 20  SpO2  Av %  Min: 92 %  Max: 100 %  Height  Av' 7" (170.2 cm)  Min: 5' 7" (170.2 " "cm)  Max: 5' 7" (170.2 cm)  Weight  Av.8 kg (120 lb 13 oz)  Min: 54.8 kg (120 lb 13 oz)  Max: 54.8 kg (120 lb 13 oz)  I/O last 3 completed shifts:  In: 2286.3 [P.O.:678; IV Piggyback:1608.3]  Out: 200 [Urine:200]    Physical Examination:  GEN: older man, sitting in bed in NAD  HEENT: face symmetric, conjunctivae anicteric, MMM  CV: regular rhythm, normal rate, warm extremities bilaterally  PULM: bedside ultrasound with simple appearing pleural effusion on the right, no loculations, normal respiratory effort on room air  Abd: soft, non-distended  Ext: no LE pitting edema  MSK: moving all extremities  Skin: dry skin on bilateral legs, no rashes  Neuro: alert, oriented, answering questions appropriately       Laboratory:  Trended Lab Data:  BMP  Lab Results   Component Value Date     2024    K 3.5 2024     2024    CO2 22 (L) 2024    BUN 13 2024    CREATININE 0.7 2024    CALCIUM 8.1 (L) 2024    ANIONGAP 9 2024    EGFRNORACEVR >60.0 2024     Lab Results   Component Value Date    WBC 5.95 2024    HGB 7.2 (L) 2024    HCT 25.1 (L) 2024    MCV 90 2024     2024       Radiology:  CT chest/abd/pelvis 2024  Impression:     Small peripheral area of non enhancement in the spleen concerning for infarct.     Bilateral pleural effusions right greater than left with adjacent atelectasis.  Left lung base opacities which could be atelectasis or developing consolidation.     Liver cirrhosis and multiple liver lesions with post treatment related changes in the right hepatic lobe.  These findings are better assessed on recent MRI.     Chronic SMV and splenic vein thrombosis.  Possible thrombosis of the right portal vein as before.    I have personally reviewed the above labs and imaging.     Assessment:     Javier Downs is a 74 y.o. male with CRC with metastatic lesions to the liver on FOLFIRI and BEVACIZUMAB who presented " from hematology clinic with progressive dyspnea and worsening anemia found to be hypotensive and tachycardic on admission which improved with IVF. Pulm consulted for evaluation of right pleural effusion.     # Right pleural effusion  # Small left pleural effusion  Given known metastatic colorectal cancer to the liver with new right pleural effusion, concerned for malignant effusion. He has a very small left pleural effusion that is not amenable to any intervention. On review of prior imaging, effusions were not seen on imaging from 9/2024, they were seen on MRI abdomen from 11/22/2024. Wonder if pleural effusion may be secondary to Y90 therapy which was completed on 11/21/2024. Evaluated with bedside ultrasound, there is a limited pocket for therapeutic thoracentesis. Lower concern for infectious etiology as tachycardia and hypotension quickly resolved with fluids as did his leukocytosis. Additionally pleural effusion without evidence of loculations. On discussion with patient and primary team, given small amount of fluid and no clear pocket for thoracentesis, will hold off at this time.     - if patient has worsening dyspnea and evidence of increasing pleural effusion, can pursue IR thoracentesis as outpatient     Thank you for allowing us to participate in the care of this patient. Please contact me if you have any questions regarding this consult.    Laurie Victoria MD  Pulmonary & Critical Care Medicine Fellow

## 2024-12-23 ENCOUNTER — TELEPHONE (OUTPATIENT)
Dept: INTERVENTIONAL RADIOLOGY/VASCULAR | Facility: CLINIC | Age: 74
End: 2024-12-23
Payer: MEDICARE

## 2024-12-25 LAB
BACTERIA BLD CULT: NORMAL
BACTERIA BLD CULT: NORMAL

## 2024-12-26 NOTE — PLAN OF CARE
Through communication with [facility], the Inpatient order is being changed to observation as the payer will not authorize Inpatient and the facility agrees not to appeal or challenge the change in status. The account will be changed to Observation for billing purposes.

## 2024-12-27 NOTE — TELEPHONE ENCOUNTER
Case Management Discharge Note          Date / Time of Note: 12/27/2024 2:30 PM                  Patient Name: Ran Espinoza   YOB: 1930  Diagnosis: Dyspnea on exertion [R06.09]  Anemia [D64.9]  Anemia, unspecified type [D64.9]   Date / Time: 12/23/2024 12:27 PM    Financial:  Payor: MEDICARE / Plan: MEDICARE PART A AND B / Product Type: *No Product type* /      Pharmacy:    [facility]/pharmacy #[id number] - [place], [place] - [address] - P [phone] - F [phone]  [address]  [place] [address]  Phone: [phone] Fax: [phone]      Assistance purchasing medications?: Potential Assistance Purchasing Medications: No  Assistance provided by Case Management: None at this time    DISCHARGE Disposition: Skilled Nursing Facility (SNF)    Nursing Facility:   Discharging to Facility/ Agency   Name: [facility]  Address:  [address] [place] [address]  Report Phone:  [phone] ([place] [facility])  Fax:  [phone]     LOC at discharge: Skilled Nursing  SEMAJ Completed: Yes            Notification completed in HENS/PAS?:  Yes : CM has completed HENS online through secure website for SNF admission at [facility].   Document ID #: [id number]    Transportation:  Transportation PLAN for discharge: EMS transportation   Mode of Transport: Ambulance stretcher - BLS  Name of Transport Company: [facility]  Phone: [phone]  Time of Transport: 1400    Transport form completed: Yes    IMM Completed:   Yes, Case management has presented and reviewed IMM letter #2.       IMM Letter date given:: 12/24/24   .   Patient and/or family/POA verbalized understanding of their medicare rights and appeal process if needed. Patient and/or family/POA has signed, initialed and placed the date and time on IMM letter #2 on the the appropriate lines. Copy of letter offered and they are aware that the original copy of IMM letter #2 is available prior to discharge from the paper chart on the  ----- Message from Kaylyn Clemens sent at 10/18/2022  8:37 AM CDT -----  Contact: Pt Daughter  Pt daughter is calling in regards to the information for Pt surgery that is scheduled for 10/20/2022. She stated she was awaiting a phone call from staff but did not get that call as of now.     Confirmed contact below:   Contact Name: Carrie Pt daughter   Phone Number: [phone]

## 2025-01-03 RX ORDER — SODIUM CHLORIDE 0.9 % (FLUSH) 0.9 %
10 SYRINGE (ML) INJECTION
OUTPATIENT
Start: 2025-01-13

## 2025-01-03 RX ORDER — DIPHENHYDRAMINE HYDROCHLORIDE 50 MG/ML
50 INJECTION INTRAMUSCULAR; INTRAVENOUS ONCE AS NEEDED
OUTPATIENT
Start: 2025-01-13

## 2025-01-03 RX ORDER — EPINEPHRINE 0.3 MG/.3ML
0.3 INJECTION SUBCUTANEOUS ONCE AS NEEDED
OUTPATIENT
Start: 2025-01-13

## 2025-01-03 RX ORDER — FAMOTIDINE 10 MG/ML
20 INJECTION INTRAVENOUS
OUTPATIENT
Start: 2025-01-13

## 2025-01-03 RX ORDER — ACETAMINOPHEN 325 MG/1
650 TABLET ORAL
OUTPATIENT
Start: 2025-01-13

## 2025-01-03 RX ORDER — HEPARIN 100 UNIT/ML
500 SYRINGE INTRAVENOUS
OUTPATIENT
Start: 2025-01-13

## 2025-01-06 ENCOUNTER — INFUSION (OUTPATIENT)
Dept: INFUSION THERAPY | Facility: HOSPITAL | Age: 75
End: 2025-01-06
Payer: MEDICARE

## 2025-01-06 ENCOUNTER — OFFICE VISIT (OUTPATIENT)
Dept: HEMATOLOGY/ONCOLOGY | Facility: CLINIC | Age: 75
End: 2025-01-06
Payer: MEDICARE

## 2025-01-06 ENCOUNTER — LAB VISIT (OUTPATIENT)
Dept: LAB | Facility: HOSPITAL | Age: 75
End: 2025-01-06
Payer: MEDICARE

## 2025-01-06 VITALS
TEMPERATURE: 97 F | SYSTOLIC BLOOD PRESSURE: 98 MMHG | HEIGHT: 67 IN | BODY MASS INDEX: 18.79 KG/M2 | WEIGHT: 119.69 LBS | DIASTOLIC BLOOD PRESSURE: 71 MMHG | OXYGEN SATURATION: 100 % | HEART RATE: 127 BPM

## 2025-01-06 VITALS
OXYGEN SATURATION: 100 % | WEIGHT: 119.69 LBS | DIASTOLIC BLOOD PRESSURE: 57 MMHG | BODY MASS INDEX: 18.79 KG/M2 | SYSTOLIC BLOOD PRESSURE: 97 MMHG | HEIGHT: 67 IN | TEMPERATURE: 97 F | HEART RATE: 95 BPM | RESPIRATION RATE: 18 BRPM

## 2025-01-06 DIAGNOSIS — C18.9 METASTATIC COLON CANCER TO LIVER: Primary | ICD-10-CM

## 2025-01-06 DIAGNOSIS — R39.9 LOWER URINARY TRACT SYMPTOMS (LUTS): ICD-10-CM

## 2025-01-06 DIAGNOSIS — D84.81 IMMUNODEFICIENCY SECONDARY TO NEOPLASM: ICD-10-CM

## 2025-01-06 DIAGNOSIS — C18.9 METASTATIC COLON CANCER TO LIVER: ICD-10-CM

## 2025-01-06 DIAGNOSIS — D64.81 ANEMIA ASSOCIATED WITH CHEMOTHERAPY: ICD-10-CM

## 2025-01-06 DIAGNOSIS — T45.1X5A ANEMIA ASSOCIATED WITH CHEMOTHERAPY: ICD-10-CM

## 2025-01-06 DIAGNOSIS — D84.821 IMMUNODEFICIENCY DUE TO CHEMOTHERAPY: ICD-10-CM

## 2025-01-06 DIAGNOSIS — D49.9 IMMUNODEFICIENCY SECONDARY TO NEOPLASM: ICD-10-CM

## 2025-01-06 DIAGNOSIS — T45.1X5A CHEMOTHERAPY INDUCED NEUTROPENIA: ICD-10-CM

## 2025-01-06 DIAGNOSIS — K92.2 LOWER GI BLEED: ICD-10-CM

## 2025-01-06 DIAGNOSIS — C78.7 METASTATIC COLON CANCER TO LIVER: Primary | ICD-10-CM

## 2025-01-06 DIAGNOSIS — Z79.899 IMMUNODEFICIENCY DUE TO CHEMOTHERAPY: ICD-10-CM

## 2025-01-06 DIAGNOSIS — I95.9 HYPOTENSION, UNSPECIFIED HYPOTENSION TYPE: ICD-10-CM

## 2025-01-06 DIAGNOSIS — G89.3 NEOPLASM RELATED PAIN: ICD-10-CM

## 2025-01-06 DIAGNOSIS — R63.4 WEIGHT DECREASE: ICD-10-CM

## 2025-01-06 DIAGNOSIS — T45.1X5A IMMUNODEFICIENCY DUE TO CHEMOTHERAPY: ICD-10-CM

## 2025-01-06 DIAGNOSIS — C78.7 METASTATIC COLON CANCER TO LIVER: ICD-10-CM

## 2025-01-06 DIAGNOSIS — K59.03 DRUG-INDUCED CONSTIPATION: ICD-10-CM

## 2025-01-06 DIAGNOSIS — D70.1 CHEMOTHERAPY INDUCED NEUTROPENIA: ICD-10-CM

## 2025-01-06 DIAGNOSIS — E43 SEVERE MALNUTRITION: ICD-10-CM

## 2025-01-06 LAB
ABO + RH BLD: ABNORMAL
ALBUMIN SERPL BCP-MCNC: 2.4 G/DL (ref 3.5–5.2)
ALP SERPL-CCNC: 173 U/L (ref 40–150)
ALT SERPL W/O P-5'-P-CCNC: 7 U/L (ref 10–44)
ANION GAP SERPL CALC-SCNC: 12 MMOL/L (ref 8–16)
AST SERPL-CCNC: 26 U/L (ref 10–40)
BASOPHILS # BLD AUTO: 0.37 K/UL (ref 0–0.2)
BASOPHILS NFR BLD: 1.7 % (ref 0–1.9)
BILIRUB SERPL-MCNC: 1.1 MG/DL (ref 0.1–1)
BLD GP AB SCN CELLS X3 SERPL QL: ABNORMAL
BLOOD GROUP ANTIBODIES SERPL: NORMAL
BUN SERPL-MCNC: 11 MG/DL (ref 8–23)
CALCIUM SERPL-MCNC: 8.6 MG/DL (ref 8.7–10.5)
CEA SERPL-MCNC: 38.1 NG/ML (ref 0–5)
CHLORIDE SERPL-SCNC: 104 MMOL/L (ref 95–110)
CO2 SERPL-SCNC: 22 MMOL/L (ref 23–29)
CREAT SERPL-MCNC: 0.7 MG/DL (ref 0.5–1.4)
DIFFERENTIAL METHOD BLD: ABNORMAL
EOSINOPHIL # BLD AUTO: 0.6 K/UL (ref 0–0.5)
EOSINOPHIL NFR BLD: 2.6 % (ref 0–8)
ERYTHROCYTE [DISTWIDTH] IN BLOOD BY AUTOMATED COUNT: 20.8 % (ref 11.5–14.5)
EST. GFR  (NO RACE VARIABLE): >60 ML/MIN/1.73 M^2
GLUCOSE SERPL-MCNC: 94 MG/DL (ref 70–110)
HCT VFR BLD AUTO: 33.7 % (ref 40–54)
HGB BLD-MCNC: 10.3 G/DL (ref 14–18)
IMM GRANULOCYTES # BLD AUTO: 0.27 K/UL (ref 0–0.04)
IMM GRANULOCYTES NFR BLD AUTO: 1.2 % (ref 0–0.5)
LYMPHOCYTES # BLD AUTO: 1.7 K/UL (ref 1–4.8)
LYMPHOCYTES NFR BLD: 7.9 % (ref 18–48)
MCH RBC QN AUTO: 26.5 PG (ref 27–31)
MCHC RBC AUTO-ENTMCNC: 30.6 G/DL (ref 32–36)
MCV RBC AUTO: 87 FL (ref 82–98)
MONOCYTES # BLD AUTO: 2.2 K/UL (ref 0.3–1)
MONOCYTES NFR BLD: 10.2 % (ref 4–15)
NEUTROPHILS # BLD AUTO: 16.6 K/UL (ref 1.8–7.7)
NEUTROPHILS NFR BLD: 76.4 % (ref 38–73)
NRBC BLD-RTO: 0 /100 WBC
PLATELET # BLD AUTO: 293 K/UL (ref 150–450)
PLATELET BLD QL SMEAR: ABNORMAL
PMV BLD AUTO: 10.4 FL (ref 9.2–12.9)
POTASSIUM SERPL-SCNC: 3.9 MMOL/L (ref 3.5–5.1)
PROT SERPL-MCNC: 6.7 G/DL (ref 6–8.4)
RBC # BLD AUTO: 3.88 M/UL (ref 4.6–6.2)
SODIUM SERPL-SCNC: 138 MMOL/L (ref 136–145)
SPECIMEN OUTDATE: ABNORMAL
WBC # BLD AUTO: 21.76 K/UL (ref 3.9–12.7)

## 2025-01-06 PROCEDURE — 96367 TX/PROPH/DG ADDL SEQ IV INF: CPT | Mod: HCNC

## 2025-01-06 PROCEDURE — 86870 RBC ANTIBODY IDENTIFICATION: CPT | Mod: HCNC | Performed by: PHYSICIAN ASSISTANT

## 2025-01-06 PROCEDURE — 96375 TX/PRO/DX INJ NEW DRUG ADDON: CPT | Mod: HCNC

## 2025-01-06 PROCEDURE — 3078F DIAST BP <80 MM HG: CPT | Mod: HCNC,CPTII,S$GLB, | Performed by: INTERNAL MEDICINE

## 2025-01-06 PROCEDURE — 96413 CHEMO IV INFUSION 1 HR: CPT | Mod: HCNC

## 2025-01-06 PROCEDURE — 82378 CARCINOEMBRYONIC ANTIGEN: CPT | Mod: HCNC | Performed by: PHYSICIAN ASSISTANT

## 2025-01-06 PROCEDURE — 99999 PR PBB SHADOW E&M-EST. PATIENT-LVL III: CPT | Mod: PBBFAC,HCNC,, | Performed by: INTERNAL MEDICINE

## 2025-01-06 PROCEDURE — 80053 COMPREHEN METABOLIC PANEL: CPT | Mod: HCNC | Performed by: PHYSICIAN ASSISTANT

## 2025-01-06 PROCEDURE — 85025 COMPLETE CBC W/AUTO DIFF WBC: CPT | Mod: HCNC | Performed by: PHYSICIAN ASSISTANT

## 2025-01-06 PROCEDURE — 1159F MED LIST DOCD IN RCRD: CPT | Mod: HCNC,CPTII,S$GLB, | Performed by: INTERNAL MEDICINE

## 2025-01-06 PROCEDURE — 1101F PT FALLS ASSESS-DOCD LE1/YR: CPT | Mod: HCNC,CPTII,S$GLB, | Performed by: INTERNAL MEDICINE

## 2025-01-06 PROCEDURE — 3008F BODY MASS INDEX DOCD: CPT | Mod: HCNC,CPTII,S$GLB, | Performed by: INTERNAL MEDICINE

## 2025-01-06 PROCEDURE — 25000003 PHARM REV CODE 250: Mod: HCNC | Performed by: INTERNAL MEDICINE

## 2025-01-06 PROCEDURE — 36415 COLL VENOUS BLD VENIPUNCTURE: CPT | Mod: HCNC | Performed by: PHYSICIAN ASSISTANT

## 2025-01-06 PROCEDURE — 1126F AMNT PAIN NOTED NONE PRSNT: CPT | Mod: HCNC,CPTII,S$GLB, | Performed by: INTERNAL MEDICINE

## 2025-01-06 PROCEDURE — 3288F FALL RISK ASSESSMENT DOCD: CPT | Mod: HCNC,CPTII,S$GLB, | Performed by: INTERNAL MEDICINE

## 2025-01-06 PROCEDURE — 3074F SYST BP LT 130 MM HG: CPT | Mod: HCNC,CPTII,S$GLB, | Performed by: INTERNAL MEDICINE

## 2025-01-06 PROCEDURE — 96416 CHEMO PROLONG INFUSE W/PUMP: CPT | Mod: HCNC

## 2025-01-06 PROCEDURE — G2211 COMPLEX E/M VISIT ADD ON: HCPCS | Mod: HCNC,S$GLB,, | Performed by: INTERNAL MEDICINE

## 2025-01-06 PROCEDURE — 86901 BLOOD TYPING SEROLOGIC RH(D): CPT | Mod: HCNC | Performed by: PHYSICIAN ASSISTANT

## 2025-01-06 PROCEDURE — 63600175 PHARM REV CODE 636 W HCPCS: Mod: HCNC | Performed by: INTERNAL MEDICINE

## 2025-01-06 PROCEDURE — 99215 OFFICE O/P EST HI 40 MIN: CPT | Mod: HCNC,S$GLB,, | Performed by: INTERNAL MEDICINE

## 2025-01-06 RX ORDER — SODIUM CHLORIDE 0.9 % (FLUSH) 0.9 %
10 SYRINGE (ML) INJECTION
Status: CANCELLED | OUTPATIENT
Start: 2025-01-07

## 2025-01-06 RX ORDER — PROCHLORPERAZINE EDISYLATE 5 MG/ML
5 INJECTION INTRAMUSCULAR; INTRAVENOUS ONCE AS NEEDED
Status: DISCONTINUED | OUTPATIENT
Start: 2025-01-06 | End: 2025-01-06 | Stop reason: HOSPADM

## 2025-01-06 RX ORDER — HEPARIN 100 UNIT/ML
500 SYRINGE INTRAVENOUS
Status: DISCONTINUED | OUTPATIENT
Start: 2025-01-06 | End: 2025-01-06 | Stop reason: HOSPADM

## 2025-01-06 RX ORDER — EPINEPHRINE 0.3 MG/.3ML
0.3 INJECTION SUBCUTANEOUS ONCE AS NEEDED
Status: DISCONTINUED | OUTPATIENT
Start: 2025-01-06 | End: 2025-01-06 | Stop reason: HOSPADM

## 2025-01-06 RX ORDER — ATROPINE SULFATE 0.4 MG/ML
0.4 INJECTION, SOLUTION ENDOTRACHEAL; INTRAMEDULLARY; INTRAMUSCULAR; INTRAVENOUS; SUBCUTANEOUS ONCE AS NEEDED
Status: COMPLETED | OUTPATIENT
Start: 2025-01-06 | End: 2025-01-06

## 2025-01-06 RX ORDER — ATROPINE SULFATE 0.4 MG/ML
0.4 INJECTION, SOLUTION ENDOTRACHEAL; INTRAMEDULLARY; INTRAMUSCULAR; INTRAVENOUS; SUBCUTANEOUS ONCE AS NEEDED
Status: CANCELLED | OUTPATIENT
Start: 2025-01-06

## 2025-01-06 RX ORDER — SODIUM CHLORIDE 0.9 % (FLUSH) 0.9 %
10 SYRINGE (ML) INJECTION
Status: DISCONTINUED | OUTPATIENT
Start: 2025-01-06 | End: 2025-01-06 | Stop reason: HOSPADM

## 2025-01-06 RX ORDER — DIPHENHYDRAMINE HYDROCHLORIDE 50 MG/ML
50 INJECTION INTRAMUSCULAR; INTRAVENOUS ONCE AS NEEDED
Status: DISCONTINUED | OUTPATIENT
Start: 2025-01-06 | End: 2025-01-06 | Stop reason: HOSPADM

## 2025-01-06 RX ORDER — EPINEPHRINE 0.3 MG/.3ML
0.3 INJECTION SUBCUTANEOUS ONCE AS NEEDED
Status: CANCELLED | OUTPATIENT
Start: 2025-01-06

## 2025-01-06 RX ORDER — DIPHENHYDRAMINE HYDROCHLORIDE 50 MG/ML
50 INJECTION INTRAMUSCULAR; INTRAVENOUS ONCE AS NEEDED
Status: CANCELLED | OUTPATIENT
Start: 2025-01-06

## 2025-01-06 RX ORDER — HEPARIN 100 UNIT/ML
500 SYRINGE INTRAVENOUS
Status: CANCELLED | OUTPATIENT
Start: 2025-01-07

## 2025-01-06 RX ORDER — HEPARIN 100 UNIT/ML
500 SYRINGE INTRAVENOUS
Status: CANCELLED | OUTPATIENT
Start: 2025-01-06

## 2025-01-06 RX ORDER — PROCHLORPERAZINE EDISYLATE 5 MG/ML
5 INJECTION INTRAMUSCULAR; INTRAVENOUS ONCE AS NEEDED
Status: CANCELLED
Start: 2025-01-06

## 2025-01-06 RX ORDER — SODIUM CHLORIDE 0.9 % (FLUSH) 0.9 %
10 SYRINGE (ML) INJECTION
Status: CANCELLED | OUTPATIENT
Start: 2025-01-06

## 2025-01-06 RX ADMIN — DEXAMETHASONE SODIUM PHOSPHATE 0.25 MG: 4 INJECTION, SOLUTION INTRA-ARTICULAR; INTRALESIONAL; INTRAMUSCULAR; INTRAVENOUS; SOFT TISSUE at 10:01

## 2025-01-06 RX ADMIN — IRINOTECAN HYDROCHLORIDE 200 MG: 20 INJECTION, SOLUTION INTRAVENOUS at 11:01

## 2025-01-06 RX ADMIN — ATROPINE SULFATE 0.4 MG: 0.4 INJECTION, SOLUTION INTRAVENOUS at 11:01

## 2025-01-06 RX ADMIN — SODIUM CHLORIDE: 9 INJECTION, SOLUTION INTRAVENOUS at 10:01

## 2025-01-06 RX ADMIN — FLUOROURACIL 3500 MG: 50 INJECTION, SOLUTION INTRAVENOUS at 01:01

## 2025-01-06 NOTE — PROGRESS NOTES
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 Bonilla  12/30/2022: Y-90 to R liver  1/27/2023: Y-90 to R liver  5/17/2023: Y-90 to R liver  7/11/2023: C1 FOLFIRI + Pema  7/26/2023: C2 FOLFIRI + Pema  8/8/2023: C3 FOLFIRI + Pema  8/22/23: C4 FOLFIRI + Pema  Restaging CT CAP 9/1/23: Good response to chemo.  9/7/23: C5 FOLFIRI + Pema  ......................................  Restaging CT CAP 10/12/23: Good response to chemo  10/16/23: C8 FOLFIRI + Pema  .......................................  Restaging CT CAP 12/8/23: Good response to chemo  12/11/23: C12 FOLFIRI + Pema  .......................................  Restaging CT CAP 2/5/24: Good response to chemo  2/8/24: C16 FOLFIRI + Pema    Restaging CT CAP 4/26/24: Good response to chemo  Restaging CT CAP 6/6/24: Good response to chemo  Restaging CT CAP 8/1/24: Stable disease  Restaging CT CAP 10/11/24: Progression of R liver lobe mass.  Plan for Y-90 to this.  Restaging CT CAP 12/20/24: No new extrahepatic disease.  Plan for MWA to left lobe lesion.      Interval History:   He presents today with his daughter prior to cycle 37 of FOLFIRI (off Avastin due to GIB). He states that he feels pretty well.  He has dyspnea on exertion - occurs after about two blocks.  This is not worsening.  Has mild abdominal distension.   Denies pain.  Underwent Y90 on 24.  Eating well.  No overt bleeding.    ECOG status is 1. Presents with his daughter today.    Past Medical History:   Past Medical History:   Diagnosis Date    MARIA A (acute kidney injury) 2022    Hypertension     Metastatic colon cancer to liver 2021     Past Surgical HIstory:   Past Surgical History:   Procedure Laterality Date    COLONOSCOPY N/A 2021    Procedure: COLONOSCOPY with possible stent;  Surgeon: EDILMA Bonilla MD;  Location: [facility] ENDO (2ND FLR);  Service: Colon and Rectal;  Laterality: N/A;    COLONOSCOPY N/A 11/3/2023    Procedure: COLONOSCOPY;  Surgeon: EDILMA Bonilla MD;  Location: [facility] ENDO (4TH FLR);  Service: Endoscopy;  Laterality: N/A;  prep instructions sent to pt via portal  From: EDILMA Bonilla MD  Procedure: Colonoscopy  Diagnosis: Surveillance colonoscopy - Hx of colon cancer  Procedure Timin-12 weeks  Provider: Myself  Location: [facility] 4Endo  Additional Scheduling Information: No scheduling con    COLONOSCOPY N/A 7/10/2024    Procedure: COLONOSCOPY;  Surgeon: Tam Pantoja MD;  Location: [facility] ENDO (2ND FLR);  Service: Endoscopy;  Laterality: N/A;    DIAGNOSTIC LAPAROSCOPY N/A 2022    Procedure: LAPAROSCOPY, DIAGNOSTIC;  Surgeon: Adrian Rodriguez MD;  Location: [facility] OR 2ND FLR;  Service: General;  Laterality: N/A;    INSERTION OF TUNNELED CENTRAL VENOUS CATHETER (CVC) WITH SUBCUTANEOUS PORT N/A 5/3/2021    Procedure: [id number];  Surgeon: Francisco Layton MD;  Location: [facility] OR 2ND FLR;  Service: Vascular;  Laterality: N/A;    OMENTECTOMY N/A 10/20/2022    Procedure: OMENTECTOMY;  Surgeon: EDILMA Bonilla MD;  Location: [facility] OR 2ND FLR;  Service: Colon and Rectal;  Laterality: N/A;    RIGHT HEMICOLECTOMY N/A 10/20/2022    Procedure: HEMICOLECTOMY, RIGHT, extended;  Surgeon: EDILMA Bonilla MD;  Location: [facility] OR 2ND FLR;  Service: Colon and Rectal;  Laterality: N/A;  Extended right hemicolectomy CONSENT IN AM      Family History:   Family History   Problem Relation Name Age of Onset    Cancer Brother         Social History:  reports that he has never smoked. He has never used smokeless tobacco. He reports that he does not currently use alcohol. He reports that he does not use drugs.    Allergies:  Review of patient's allergies indicates:  No Known Allergies    Medications:  Current Outpatient Medications   Medication Sig Dispense Refill    acetaminophen (TYLENOL) 500 MG tablet Take 1 tablet (500 mg total) by mouth every 6 (six) hours as needed for Pain (alternate with ibuprofen).  0    amLODIPine (NORVASC) 10 MG tablet Take 1 tablet (10 mg total) by mouth once daily. 90 tablet 3    LIDOcaine-prilocaine (EMLA) cream Apply topically as needed (port application). 30 g 3    ondansetron (ZOFRAN) 4 MG tablet Take 1 tablet (4 mg total) by mouth every 8 (eight) hours as needed for Nausea. 30 tablet 3    oxyCODONE (ROXICODONE) 5 MG immediate release tablet Take 1 tablet (5 mg total) by mouth every 6 (six) hours as needed for Pain. 20 tablet 0    pantoprazole (PROTONIX) 40 MG tablet Take 1 tablet (40 mg total) by mouth 2 (two) times daily before meals. 180 tablet 3    tamsulosin (FLOMAX) 0.4 mg Cap Take 1 capsule (0.4 mg total) by mouth once daily. 30 capsule 5    traMADoL (ULTRAM) 50 mg tablet Take 1 tablet (50 mg total) by mouth every 6 (six) hours as needed for Pain. 30 tablet 0     No current facility-administered medications for this visit.     Review of Systems   Constitutional:  Positive for fatigue. Negative for activity change and appetite change.   Eyes:  Negative for visual disturbance.   Respiratory:  Positive for shortness of breath. Negative for cough.    Cardiovascular:  Negative for chest pain and leg swelling.   Gastrointestinal:  Positive for abdominal distention. Negative for abdominal pain, blood in stool, constipation, diarrhea, nausea and vomiting.   Musculoskeletal:  Negative for arthralgias and back pain.  "  Skin:  Negative for wound.   Neurological:  Negative for dizziness, weakness and numbness.   Psychiatric/Behavioral:  Negative for confusion and sleep disturbance. The patient is not nervous/anxious.    All other systems reviewed and are negative.    ECOG Performance Status: 1     Objective:      Vitals:   Vitals:    01/06/25 0857   BP: 98/71   BP Location: Left arm   Patient Position: Sitting   Pulse: (!) 127   Temp: 97.3 °F (36.3 °C)   TempSrc: Temporal   SpO2: 100%   Weight: 54.3 kg (119 lb 11.4 oz)   Height: 5' 7" (1.702 m)       Physical Exam  Vitals reviewed.   Constitutional:       General: He is not in acute distress.     Comments: Chronically ill appearing   HENT:      Head: Normocephalic and atraumatic.      Right Ear: External ear normal.      Left Ear: External ear normal.      Nose: Nose normal.      Mouth/Throat:      Pharynx: Oropharynx is clear.   Eyes:      General: No scleral icterus.     Extraocular Movements: Extraocular movements intact.      Conjunctiva/sclera: Conjunctivae normal.   Cardiovascular:      Rate and Rhythm: Normal rate and regular rhythm.      Pulses: Normal pulses.      Heart sounds: Normal heart sounds. No murmur heard.  Pulmonary:      Effort: Pulmonary effort is normal. No respiratory distress.      Breath sounds: Normal breath sounds. No wheezing.   Chest:      Comments: Port to RCW, no signs of infection.  Abdominal:      General: Abdomen is flat. Bowel sounds are normal. There is distension (mild).      Palpations: Abdomen is soft. There is no mass.      Tenderness: There is no abdominal tenderness.      Comments: Vertical midline abdominal wound well healed   Musculoskeletal:         General: No swelling or deformity. Normal range of motion.      Right lower leg: No edema.      Left lower leg: No edema.   Skin:     Coloration: Skin is not jaundiced or pale.      Findings: No bruising, erythema or rash.   Neurological:      General: No focal deficit present.      Mental " Status: He is alert and oriented to person, place, and time. Mental status is at baseline.      Cranial Nerves: No cranial nerve deficit.      Sensory: No sensory deficit.      Gait: Gait normal.   Psychiatric:         Mood and Affect: Mood normal.         Behavior: Behavior normal.         Thought Content: Thought content normal.         Judgment: Judgment normal.     Laboratory Data:  Lab Visit on 01/06/2025   Component Date Value Ref Range Status    WBC 01/06/2025 21.76 (H)  3.90 - 12.70 K/uL Final    RBC 01/06/2025 3.88 (L)  4.60 - 6.20 M/uL Final    Hemoglobin 01/06/2025 10.3 (L)  14.0 - 18.0 g/dL Final    Hematocrit 01/06/2025 33.7 (L)  40.0 - 54.0 % Final    MCV 01/06/2025 87  82 - 98 fL Final    MCH 01/06/2025 26.5 (L)  27.0 - 31.0 pg Final    MCHC 01/06/2025 30.6 (L)  32.0 - 36.0 g/dL Final    RDW 01/06/2025 20.8 (H)  11.5 - 14.5 % Final    Platelets 01/06/2025 293  150 - 450 K/uL Final    MPV 01/06/2025 10.4  9.2 - 12.9 fL Final    Immature Granulocytes 01/06/2025 1.2 (H)  0.0 - 0.5 % Final    Gran # (ANC) 01/06/2025 16.6 (H)  1.8 - 7.7 K/uL Final    Immature Grans (Abs) 01/06/2025 0.27 (H)  0.00 - 0.04 K/uL Final    Comment: Mild elevation in immature granulocytes is non specific and   can be seen in a variety of conditions including stress response,   acute inflammation, trauma and pregnancy. Correlation with other   laboratory and clinical findings is essential.      Lymph # 01/06/2025 1.7  1.0 - 4.8 K/uL Final    Mono # 01/06/2025 2.2 (H)  0.3 - 1.0 K/uL Final    Eos # 01/06/2025 0.6 (H)  0.0 - 0.5 K/uL Final    Baso # 01/06/2025 0.37 (H)  0.00 - 0.20 K/uL Final    nRBC 01/06/2025 0  0 /100 WBC Final    Gran % 01/06/2025 76.4 (H)  38.0 - 73.0 % Final    Lymph % 01/06/2025 7.9 (L)  18.0 - 48.0 % Final    Mono % 01/06/2025 10.2  4.0 - 15.0 % Final    Eosinophil % 01/06/2025 2.6  0.0 - 8.0 % Final    Basophil % 01/06/2025 1.7  0.0 - 1.9 % Final    Differential Method 01/06/2025 Automated   Final    Sodium  01/06/2025 138  136 - 145 mmol/L Final    Potassium 01/06/2025 3.9  3.5 - 5.1 mmol/L Final    Chloride 01/06/2025 104  95 - 110 mmol/L Final    CO2 01/06/2025 22 (L)  23 - 29 mmol/L Final    Glucose 01/06/2025 94  70 - 110 mg/dL Final    BUN 01/06/2025 11  8 - 23 mg/dL Final    Creatinine 01/06/2025 0.7  0.5 - 1.4 mg/dL Final    Calcium 01/06/2025 8.6 (L)  8.7 - 10.5 mg/dL Final    Total Protein 01/06/2025 6.7  6.0 - 8.4 g/dL Final    Albumin 01/06/2025 2.4 (L)  3.5 - 5.2 g/dL Final    Total Bilirubin 01/06/2025 1.1 (H)  0.1 - 1.0 mg/dL Final    Comment: For infants and newborns, interpretation of results should be based  on gestational age, weight and in agreement with clinical  observations.    Premature Infant recommended reference ranges:  Up to 24 hours.............<8.0 mg/dL  Up to 48 hours............<12.0 mg/dL  3-5 days..................<15.0 mg/dL  6-29 days.................<15.0 mg/dL      Alkaline Phosphatase 01/06/2025 173 (H)  40 - 150 U/L Final    AST 01/06/2025 26  10 - 40 U/L Final    ALT 01/06/2025 7 (L)  10 - 44 U/L Final    eGFR 01/06/2025 >60.0  >60 mL/min/1.73 m^2 Final    Anion Gap 01/06/2025 12  8 - 16 mmol/L Final     Assessment and Plan        1. Metastatic colon cancer to liver    2. Immunodeficiency secondary to neoplasm    3. Immunodeficiency due to chemotherapy    4. Chemotherapy induced neutropenia    5. Lower GI bleed    6. Anemia associated with chemotherapy    7. Weight decrease    8. Neoplasm related pain    9. Drug-induced constipation    10. Severe malnutrition    11. Hypotension, unspecified hypotension type    12. Lower urinary tract symptoms (LUTS)          1-6.  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  Guardant 360 was negative for BRAF, VIRI, HER2 amplification.   Pretreatment CEA 57.  We had a long and sonia discussion with him about his diagnosis. Unfortunately, the disease is not curable but remains treatable and he has good  performance status.   Restaging scans after 7 cycles of FOLFOX + Pema showed significant reduction in colonic mass and liver mass.   we switched to maintenance as of cycle 8 with 5FU + Pema  Restaging scans from March 2022 show stable disease.   MRI on 7/18/22 showing hepatic progression of disease in the right hepatic lobe noted on the exam. CT CAP on 8/26 shows some mild progression in both liver metastases.    Discussed case at colorectal tumor board 8/31/22 and had a diagnostic lap performed by Dr. Rodriguez on 9/7 to assess for any occult peritoneal disease. Findings from the diagnostic lap were negative. Fluid from around from around the primary mass was sent for cytology that was negative for malignancy.   Underwent primary tumor resection on 10/20/22 with Dr. Bonilla.    Meanwhile his liver mets had grown.  We discussed his case at [facility] conference with plans for short term Y-90 and then restarting chemotherapy prior to considering any surgical options to his liver metastases.  He underwent Y-90 delivery on 12/30/22. Tolerated well.   CT CAP on 1/23/23 reviewed at [facility] conference with good response to Y-90, no new lesions.  Underwent second Y-90 on 1/27/23. Can consider R hepatectomy pending follow-up imaging after Y-90.     Received cycle 42 of FOLFOX (omitted oxaliplatin again starting cycle 41 due to neuropathy) on 2/9/23.  Because of 5-FU shortage nationally, we have been holding chemotherapy since mid-February 2023.  If he needs to restart chemotherapy (I.e. no plans for surgical resection), will place him on capecitabine maintenance for duration of 5-FU shortage.     Discussed at colorectal liver [facility] tumor board 3/16/23.  Plan for repeat Y-90 and then consideration of surgical resection and he will meet with liver transplant team as well.  Underwent Y-90 delivery 5/17/23 with IR.     Has been on maintenance Xeloda since late April 2023.    Met with liver transplant team but ultimately opted not to proceed  with transplant as he was concerned about how he would tolerate a major surgery with the life changes that would come after transplant as well. They closed out his case.    He met with Dr. Rodriguez to discuss whether surgical resection is indicated for his liver mets.  He recommended repeat MRI.  Repeat MRI unfortunately showed disease progression while on Xeloda maintenance.  Similarly his CEA grarett precipitously, all in keeping with worsening disease.    We recommended we restart IV chemotherapy with FOLFIRI + Avastin (never had irinotecan).    Because of hyperbilirubinemia he received cycle 1 with just 5-FU + Avastin on 7/11/23. Tolerated this well. Bilirubin has improved.  Received irinotecan starting with cycle 2.  Repeat CT CAP after cycle 4 shows good response to therapy.   Excellent tolerance. [facility] tumor board agrees with continuation of chemotherapy.   Repeat CT CAP after cycle 7 shows stable disease, no evidence of new or worsening disease.   Repeat CT CAP after cycle 11 shows stable disease.   Repeat CT CAP after cycle 15 shows improved disease.  Repeat CT CAP after cycle 21 shows stable disease.   Repeat CT CAP after cycle 24 shows stable disease.    Hospitalized after cycle 26 and 27 (without Avastin) for hematochezia.  Colonoscopy with likely diverticular bleed.  Required 2 units of blood in last month.  Will continue to hold bevacizumab indefinitely.  Discussed that we don't have a very good way preventing this recurrence and chemo with its associated thrombocytopenia may make this more likely to occur again.  He still wishes to proceed, which is my recommendation as well.    Repeat CT CAP after cycle 28 shows stable disease and recc to continue with chemotherapy.   Repeat CT CAP after cycle 32 shows progression of R liver lobe metastasis with rising CEA.  Will plan for Y-90 to growing lesion.  Underwent mapping 11/5/24 and delivery 11/21/24.  Plan for L lobe MWA per Dr. Vidal.    CT CAP 12/20/24 shows  otherwise stable disease.    Will continue FOLFIRI in the meantime.  Delayed cycle 33 due to neutropenia - has since recovered and on Neulasta.    - I have reviewed the CBC and CMP, adequate for treatment.  - Proceed with cycle 37 of FOLFIRI today + OBI Neulasta.    RTC in 2 weeks for next cycle.    Tempus xT: APC, CKS1B cng, ERCC3 cnl, PIK3CA; KENIA, TMB 12.1.  Tempus xF: APC, KRAS Q61H, PIK3CA, TP53; KENIA    7-9. Neoplasm related pain, weight loss, constipation, malnutrition  -- Pain very well controlled. Has oxycodone 5mg to use PRN but not requiring now.  -- Following with nutrition. Encouraged increased protein. Monitor.  Weight down slightly but reports good appetite.  -- Continue Miralax daily for constipation. Doing well with this.     10. Hypotension   -- BP low normal today. Asymptomatic.   -- Following with PCP.   -- encouraged him and his daughter to monitor BP and HR closely at home.     11,12. Urinary Hesitancy/dehydration  -- Refilled Flomax.   -- Reported improvement since starting medication.     RTC in 2 weeks    Pte and family members displayed understanding of the above encounter and treatment plan. All thoughtful questions were answered to their satisfaction. Pte was advised to notify the care team or proceed to the ER if signs and symptoms worsen.     Visit today included increased complexity associated with the care of the episodic problem chemotherapy  addressed and managing the longitudinal care of the patient due to the serious and/or complex managed problem(s) GI malignancies/cancer    Bunny Schuster MD  Hematology/Oncology  [facility]           Route Chart for Scheduling    Med Onc Chart Routing      Follow up with physician 4 weeks. for FOLFIRI   Follow up with RACHEL 2 weeks. for FOLFIRI   Infusion scheduling note    Injection scheduling note    Labs CBC, CMP and CEA   Scheduling:  Preferred lab:  Lab interval: every 2 weeks     Imaging    Pharmacy appointment     Other referrals

## 2025-01-06 NOTE — PLAN OF CARE
1310-Pt tolerated Folfiri well today, no complaints or complications. Connected to CADD pump for home infusion, connection sites secured, pump infusing properly at time of discharge. Pt aware to call provider with any questions or concerns, knows to return to clinic on Wednesday January 8, 2024 at approx 1100 for pump d/c, verbalized understanding. Pt ambulatory from clinic with steady gait, no distress noted.

## 2025-01-08 ENCOUNTER — INFUSION (OUTPATIENT)
Dept: INFUSION THERAPY | Facility: HOSPITAL | Age: 75
End: 2025-01-08
Payer: MEDICARE

## 2025-01-08 VITALS
RESPIRATION RATE: 18 BRPM | WEIGHT: 119.69 LBS | OXYGEN SATURATION: 100 % | HEIGHT: 67 IN | HEART RATE: 91 BPM | TEMPERATURE: 94 F | BODY MASS INDEX: 18.79 KG/M2 | SYSTOLIC BLOOD PRESSURE: 98 MMHG | DIASTOLIC BLOOD PRESSURE: 61 MMHG

## 2025-01-08 DIAGNOSIS — C78.7 METASTATIC COLON CANCER TO LIVER: Primary | ICD-10-CM

## 2025-01-08 DIAGNOSIS — T45.1X5A ANEMIA ASSOCIATED WITH CHEMOTHERAPY: ICD-10-CM

## 2025-01-08 DIAGNOSIS — C18.9 METASTATIC COLON CANCER TO LIVER: Primary | ICD-10-CM

## 2025-01-08 DIAGNOSIS — D64.81 ANEMIA ASSOCIATED WITH CHEMOTHERAPY: ICD-10-CM

## 2025-01-08 DIAGNOSIS — I95.9 HYPOTENSION, UNSPECIFIED HYPOTENSION TYPE: ICD-10-CM

## 2025-01-08 PROCEDURE — 25000003 PHARM REV CODE 250: Mod: HCNC | Performed by: INTERNAL MEDICINE

## 2025-01-08 PROCEDURE — 96377 APPLICATON ON-BODY INJECTOR: CPT | Mod: HCNC

## 2025-01-08 PROCEDURE — A4216 STERILE WATER/SALINE, 10 ML: HCPCS | Mod: HCNC | Performed by: INTERNAL MEDICINE

## 2025-01-08 PROCEDURE — 25000003 PHARM REV CODE 250: Mod: HCNC | Performed by: PHYSICIAN ASSISTANT

## 2025-01-08 PROCEDURE — 63600175 PHARM REV CODE 636 W HCPCS: Mod: HCNC | Performed by: INTERNAL MEDICINE

## 2025-01-08 RX ORDER — SODIUM CHLORIDE 0.9 % (FLUSH) 0.9 %
10 SYRINGE (ML) INJECTION
Status: DISCONTINUED | OUTPATIENT
Start: 2025-01-08 | End: 2025-01-08 | Stop reason: HOSPADM

## 2025-01-08 RX ORDER — HEPARIN 100 UNIT/ML
500 SYRINGE INTRAVENOUS
Status: DISCONTINUED | OUTPATIENT
Start: 2025-01-08 | End: 2025-01-08 | Stop reason: HOSPADM

## 2025-01-08 RX ADMIN — SODIUM CHLORIDE 1000 ML: 9 INJECTION, SOLUTION INTRAVENOUS at 12:01

## 2025-01-08 RX ADMIN — HEPARIN SODIUM (PORCINE) LOCK FLUSH IV SOLN 100 UNIT/ML 500 UNITS: 100 SOLUTION at 11:01

## 2025-01-08 RX ADMIN — Medication 10 ML: at 11:01

## 2025-01-08 RX ADMIN — PEGFILGRASTIM 6 MG: KIT SUBCUTANEOUS at 01:01

## 2025-01-08 NOTE — PLAN OF CARE
Pt sitting in chair, pt here for pump DC +OBI, unable to get an oral temp reading, Auxiliary left armpit temp 93.8 BP is 89/56 P 97, took again 91/58 P 92. he states he is SOB on exertion. 100% on RA. Notified Anali RDZ and Dr. Schuster. Started 1L bolus per Anali RDZ. CADD comp infusion complete reservoir empty and removed.    1L IVF complete. BP 98/61 P 91 Temp Oral 94.3 O2 100 R 18. he states he is feeling better. OBI applied to left upper arm 1st injection administered with grn light blinking. Port flushed with blood return heparin locked and de-accessed.  Pt escorted via WC by daughter off unit. NAD.

## 2025-01-15 ENCOUNTER — OFFICE VISIT (OUTPATIENT)
Dept: INTERVENTIONAL RADIOLOGY/VASCULAR | Facility: CLINIC | Age: 75
End: 2025-01-15
Payer: MEDICARE

## 2025-01-15 VITALS
BODY MASS INDEX: 19.22 KG/M2 | DIASTOLIC BLOOD PRESSURE: 63 MMHG | WEIGHT: 122.69 LBS | HEART RATE: 114 BPM | SYSTOLIC BLOOD PRESSURE: 86 MMHG

## 2025-01-15 DIAGNOSIS — C18.9 METASTATIC COLON CANCER TO LIVER: Primary | ICD-10-CM

## 2025-01-15 DIAGNOSIS — C78.7 METASTATIC COLON CANCER TO LIVER: Primary | ICD-10-CM

## 2025-01-15 PROCEDURE — 99999 PR PBB SHADOW E&M-EST. PATIENT-LVL II: CPT | Mod: PBBFAC,HCNC,,

## 2025-01-15 PROCEDURE — 3078F DIAST BP <80 MM HG: CPT | Mod: HCNC,CPTII,S$GLB,

## 2025-01-15 PROCEDURE — 3008F BODY MASS INDEX DOCD: CPT | Mod: HCNC,CPTII,S$GLB,

## 2025-01-15 PROCEDURE — 3074F SYST BP LT 130 MM HG: CPT | Mod: HCNC,CPTII,S$GLB,

## 2025-01-15 PROCEDURE — 99214 OFFICE O/P EST MOD 30 MIN: CPT | Mod: HCNC,S$GLB,,

## 2025-01-15 NOTE — PROGRESS NOTES
"Subjective     Patient ID: Javier Downs is a 74 y.o. male.    Chief Complaint: Follow-up    75 yo male with transverse colon metastatic cancer to the liver presents with daughter for follow up. He is s/p y90 on 12/30/2022, 1/27/2023, 5/17/2023, and most recently 11/21/2024. He has tolerated local regional therapy well. Here to follow up on 1 month imaging. He reports current chemotherapy regime has him fatigue. He reports "good days and bad days." He is Hypotensive in clinic. Consistent with home BP and infusion BP. Denies any symptoms.     PMH and medications reviewed.   Not taking any blood thinners.  Ablation will be scheduled with anes.   Oncology clinic note reviewed.         Review of Systems   Constitutional:  Positive for activity change, appetite change, fatigue and unexpected weight change.   Respiratory:  Positive for shortness of breath (VARGAS).    Cardiovascular:  Negative for chest pain.   Gastrointestinal:  Positive for abdominal pain.   Neurological:  Negative for facial asymmetry and speech difficulty.   Psychiatric/Behavioral:  Negative for behavioral problems and confusion.           Objective     Physical Exam  Constitutional:       Appearance: Normal appearance.   HENT:      Head: Atraumatic.      Nose: Nose normal.   Eyes:      Conjunctiva/sclera: Conjunctivae normal.   Pulmonary:      Effort: Pulmonary effort is normal. No respiratory distress.   Neurological:      General: No focal deficit present.      Mental Status: He is alert and oriented to person, place, and time.   Psychiatric:         Mood and Affect: Mood normal.         Behavior: Behavior normal.          Assessment and Plan     1. Metastatic colon cancer to liver  -     IR RF Ablation Liver Tumors; Future; Expected date: 01/15/2025    Imaging reviewed by Dr. Vidal. Right sided treatment shows good response; however, he has disease on the left. Will proceed with MWA to the left.   - CEA in 1 month. Scheduled on 2/5.    - Discussed " hypotension with Dr. Schuster. Patient will hold Amlodipine until follow up with oncology on 1/21.   - Discussed how the procedure will be performed, risks (including, but not limited to, pain, bleeding, infection, damage to nearby structures, and the need for additional procedures), benefits, possible complications, pre-post procedure expectations, and alternatives. The patient voices understanding and all questions have been answered.  The patient agrees to proceed as planned. Patient scheduled for 2/10/2025. Pre-procedure handout with clinic phone number provided.    Nilsa Flores PA-C  Interventional Radiology  [phone]

## 2025-01-15 NOTE — LETTER
January 15, 2025    Javier Downs  [address] [id number]       [facility] [address]  [address]  [place] [address]  Phone: [phone] PRE-PROCEDURE INSTRUCTIONS    Your procedure with Interventional Radiology is scheduled for   Please arrive by     **Do not eat or drink anything between midnight and the time of your procedure. This includes gum, mints, and candy lemon drops.    **Do not smoke or drink alcoholic beverages 24 hours prior to your procedure.    **If you wear contact lenses, dentures, hearing aids, or glasses, bring a container to put them in during the procedure and give them to a family member for safekeeping.    **If you have been diagnosed with sleep apnea please bring your CPAP machine.    **If your doctor has scheduled you for an overnight stay, bring a small overnight bag with any personal items that you may need.    **Make arrangements in advance for transportation home by a responsible adult. It is not safe to drive a vehicle during the 24 hours following the procedure.    **All [facility] facilities and properties are tobacco free. Smoking is NOT allowed.    PLEASE NOTE: The procedure schedule has many variables which affect the time of your procedure. Family members should be available if your surgery time changes.    If you have any questions about these instructions call Interventional Radiology at [phone] Monday - Friday between 8:00am and 4:00pm or [phone] (ask for interventional radiology resident) for after hours.

## 2025-01-15 NOTE — PROGRESS NOTES
Patient seen in IR clinic today.  Scheduled for upcoming IR procedure.  Pre-procedure instructions were reviewed with patient and daughter.  Patient instructed not to eat  anything solid after midnight the night before procedure.  Pt aware will need someone to provide transport home and monitor pt 8 hours post procedure.  No driving for at least 24 hours after procedure.   Patient and daughter aware Anes. nurse will call 1 or 2 days before procedure they will go over instructions which will include when to stop eating and drinking and which medications to take.  Pt verbalized understanding of all pre-procedure instructions.  Written instructions given at appointment today/sent to patient in Mychart/portal.

## 2025-01-20 ENCOUNTER — TELEPHONE (OUTPATIENT)
Dept: HEMATOLOGY/ONCOLOGY | Facility: CLINIC | Age: 75
End: 2025-01-20
Payer: MEDICARE

## 2025-01-20 NOTE — TELEPHONE ENCOUNTER
Spoke to patient's daughter. Patient will do VV tomorrow. Labs scheduled for Wednesday prior to treatment.

## 2025-02-05 ENCOUNTER — OFFICE VISIT (OUTPATIENT)
Dept: HEMATOLOGY/ONCOLOGY | Facility: CLINIC | Age: 75
End: 2025-02-05
Payer: MEDICARE

## 2025-02-05 ENCOUNTER — INFUSION (OUTPATIENT)
Dept: INFUSION THERAPY | Facility: HOSPITAL | Age: 75
End: 2025-02-05
Payer: MEDICARE

## 2025-02-05 VITALS
BODY MASS INDEX: 18.39 KG/M2 | HEIGHT: 67 IN | DIASTOLIC BLOOD PRESSURE: 64 MMHG | RESPIRATION RATE: 14 BRPM | SYSTOLIC BLOOD PRESSURE: 98 MMHG | OXYGEN SATURATION: 98 % | HEART RATE: 104 BPM | WEIGHT: 117.19 LBS

## 2025-02-05 VITALS
OXYGEN SATURATION: 98 % | BODY MASS INDEX: 18.39 KG/M2 | WEIGHT: 117.19 LBS | RESPIRATION RATE: 16 BRPM | SYSTOLIC BLOOD PRESSURE: 98 MMHG | HEART RATE: 104 BPM | HEIGHT: 67 IN | DIASTOLIC BLOOD PRESSURE: 64 MMHG

## 2025-02-05 DIAGNOSIS — K92.2 LOWER GI BLEED: ICD-10-CM

## 2025-02-05 DIAGNOSIS — T45.1X5A IMMUNODEFICIENCY DUE TO CHEMOTHERAPY: ICD-10-CM

## 2025-02-05 DIAGNOSIS — R39.9 LOWER URINARY TRACT SYMPTOMS (LUTS): ICD-10-CM

## 2025-02-05 DIAGNOSIS — T45.1X5A ANEMIA ASSOCIATED WITH CHEMOTHERAPY: ICD-10-CM

## 2025-02-05 DIAGNOSIS — G89.3 NEOPLASM RELATED PAIN: ICD-10-CM

## 2025-02-05 DIAGNOSIS — C78.7 METASTATIC COLON CANCER TO LIVER: Primary | ICD-10-CM

## 2025-02-05 DIAGNOSIS — D84.821 IMMUNODEFICIENCY DUE TO CHEMOTHERAPY: ICD-10-CM

## 2025-02-05 DIAGNOSIS — I95.9 HYPOTENSION, UNSPECIFIED HYPOTENSION TYPE: ICD-10-CM

## 2025-02-05 DIAGNOSIS — K59.03 DRUG-INDUCED CONSTIPATION: ICD-10-CM

## 2025-02-05 DIAGNOSIS — C18.9 METASTATIC COLON CANCER TO LIVER: Primary | ICD-10-CM

## 2025-02-05 DIAGNOSIS — D84.81 IMMUNODEFICIENCY SECONDARY TO NEOPLASM: ICD-10-CM

## 2025-02-05 DIAGNOSIS — E43 SEVERE MALNUTRITION: ICD-10-CM

## 2025-02-05 DIAGNOSIS — Z79.899 IMMUNODEFICIENCY DUE TO CHEMOTHERAPY: ICD-10-CM

## 2025-02-05 DIAGNOSIS — D70.1 CHEMOTHERAPY INDUCED NEUTROPENIA: ICD-10-CM

## 2025-02-05 DIAGNOSIS — D64.81 ANEMIA ASSOCIATED WITH CHEMOTHERAPY: ICD-10-CM

## 2025-02-05 DIAGNOSIS — T45.1X5A CHEMOTHERAPY INDUCED NEUTROPENIA: ICD-10-CM

## 2025-02-05 DIAGNOSIS — E86.0 DEHYDRATION: ICD-10-CM

## 2025-02-05 DIAGNOSIS — R63.4 WEIGHT DECREASE: ICD-10-CM

## 2025-02-05 DIAGNOSIS — D49.9 IMMUNODEFICIENCY SECONDARY TO NEOPLASM: ICD-10-CM

## 2025-02-05 PROCEDURE — 99215 OFFICE O/P EST HI 40 MIN: CPT | Mod: HCNC,S$GLB,, | Performed by: REGISTERED NURSE

## 2025-02-05 PROCEDURE — 3074F SYST BP LT 130 MM HG: CPT | Mod: HCNC,CPTII,S$GLB, | Performed by: REGISTERED NURSE

## 2025-02-05 PROCEDURE — 63600175 PHARM REV CODE 636 W HCPCS: Mod: HCNC | Performed by: REGISTERED NURSE

## 2025-02-05 PROCEDURE — 1101F PT FALLS ASSESS-DOCD LE1/YR: CPT | Mod: HCNC,CPTII,S$GLB, | Performed by: REGISTERED NURSE

## 2025-02-05 PROCEDURE — 99999 PR PBB SHADOW E&M-EST. PATIENT-LVL III: CPT | Mod: PBBFAC,HCNC,, | Performed by: REGISTERED NURSE

## 2025-02-05 PROCEDURE — 96367 TX/PROPH/DG ADDL SEQ IV INF: CPT | Mod: HCNC

## 2025-02-05 PROCEDURE — 1159F MED LIST DOCD IN RCRD: CPT | Mod: HCNC,CPTII,S$GLB, | Performed by: REGISTERED NURSE

## 2025-02-05 PROCEDURE — 96375 TX/PRO/DX INJ NEW DRUG ADDON: CPT | Mod: HCNC

## 2025-02-05 PROCEDURE — 3288F FALL RISK ASSESSMENT DOCD: CPT | Mod: HCNC,CPTII,S$GLB, | Performed by: REGISTERED NURSE

## 2025-02-05 PROCEDURE — 3078F DIAST BP <80 MM HG: CPT | Mod: HCNC,CPTII,S$GLB, | Performed by: REGISTERED NURSE

## 2025-02-05 PROCEDURE — 1160F RVW MEDS BY RX/DR IN RCRD: CPT | Mod: HCNC,CPTII,S$GLB, | Performed by: REGISTERED NURSE

## 2025-02-05 PROCEDURE — 96365 THER/PROPH/DIAG IV INF INIT: CPT | Mod: HCNC

## 2025-02-05 PROCEDURE — G2211 COMPLEX E/M VISIT ADD ON: HCPCS | Mod: HCNC,S$GLB,, | Performed by: REGISTERED NURSE

## 2025-02-05 PROCEDURE — 25000003 PHARM REV CODE 250: Mod: HCNC | Performed by: REGISTERED NURSE

## 2025-02-05 PROCEDURE — 3008F BODY MASS INDEX DOCD: CPT | Mod: HCNC,CPTII,S$GLB, | Performed by: REGISTERED NURSE

## 2025-02-05 PROCEDURE — 96416 CHEMO PROLONG INFUSE W/PUMP: CPT | Mod: HCNC

## 2025-02-05 PROCEDURE — 1126F AMNT PAIN NOTED NONE PRSNT: CPT | Mod: HCNC,CPTII,S$GLB, | Performed by: REGISTERED NURSE

## 2025-02-05 RX ORDER — HEPARIN 100 UNIT/ML
500 SYRINGE INTRAVENOUS
Status: CANCELLED | OUTPATIENT
Start: 2025-02-05

## 2025-02-05 RX ORDER — EPINEPHRINE 0.3 MG/.3ML
0.3 INJECTION SUBCUTANEOUS ONCE AS NEEDED
Status: CANCELLED | OUTPATIENT
Start: 2025-02-05

## 2025-02-05 RX ORDER — SODIUM CHLORIDE 0.9 % (FLUSH) 0.9 %
10 SYRINGE (ML) INJECTION
Status: CANCELLED | OUTPATIENT
Start: 2025-02-05

## 2025-02-05 RX ORDER — DIPHENHYDRAMINE HYDROCHLORIDE 50 MG/ML
50 INJECTION INTRAMUSCULAR; INTRAVENOUS ONCE AS NEEDED
Status: DISCONTINUED | OUTPATIENT
Start: 2025-02-05 | End: 2025-02-05 | Stop reason: HOSPADM

## 2025-02-05 RX ORDER — HEPARIN 100 UNIT/ML
500 SYRINGE INTRAVENOUS
Status: CANCELLED | OUTPATIENT
Start: 2025-02-06

## 2025-02-05 RX ORDER — ATROPINE SULFATE 0.4 MG/ML
0.4 INJECTION, SOLUTION ENDOTRACHEAL; INTRAMEDULLARY; INTRAMUSCULAR; INTRAVENOUS; SUBCUTANEOUS ONCE AS NEEDED
Status: COMPLETED | OUTPATIENT
Start: 2025-02-05 | End: 2025-02-05

## 2025-02-05 RX ORDER — EPINEPHRINE 0.3 MG/.3ML
0.3 INJECTION SUBCUTANEOUS ONCE AS NEEDED
Status: DISCONTINUED | OUTPATIENT
Start: 2025-02-05 | End: 2025-02-05 | Stop reason: HOSPADM

## 2025-02-05 RX ORDER — PROCHLORPERAZINE EDISYLATE 5 MG/ML
5 INJECTION INTRAMUSCULAR; INTRAVENOUS ONCE AS NEEDED
Status: CANCELLED
Start: 2025-02-05

## 2025-02-05 RX ORDER — DIPHENHYDRAMINE HYDROCHLORIDE 50 MG/ML
50 INJECTION INTRAMUSCULAR; INTRAVENOUS ONCE AS NEEDED
Status: CANCELLED | OUTPATIENT
Start: 2025-02-05

## 2025-02-05 RX ORDER — SODIUM CHLORIDE 0.9 % (FLUSH) 0.9 %
10 SYRINGE (ML) INJECTION
Status: CANCELLED | OUTPATIENT
Start: 2025-02-06

## 2025-02-05 RX ORDER — ATROPINE SULFATE 0.4 MG/ML
0.4 INJECTION, SOLUTION ENDOTRACHEAL; INTRAMEDULLARY; INTRAMUSCULAR; INTRAVENOUS; SUBCUTANEOUS ONCE AS NEEDED
Status: CANCELLED | OUTPATIENT
Start: 2025-02-05

## 2025-02-05 RX ORDER — SODIUM CHLORIDE 0.9 % (FLUSH) 0.9 %
10 SYRINGE (ML) INJECTION
Status: DISCONTINUED | OUTPATIENT
Start: 2025-02-05 | End: 2025-02-05 | Stop reason: HOSPADM

## 2025-02-05 RX ORDER — HEPARIN 100 UNIT/ML
500 SYRINGE INTRAVENOUS
Status: DISCONTINUED | OUTPATIENT
Start: 2025-02-05 | End: 2025-02-05 | Stop reason: HOSPADM

## 2025-02-05 RX ORDER — PROCHLORPERAZINE EDISYLATE 5 MG/ML
5 INJECTION INTRAMUSCULAR; INTRAVENOUS ONCE AS NEEDED
Status: DISCONTINUED | OUTPATIENT
Start: 2025-02-05 | End: 2025-02-05 | Stop reason: HOSPADM

## 2025-02-05 RX ADMIN — IRINOTECAN HYDROCHLORIDE 200 MG: 20 INJECTION, SOLUTION INTRAVENOUS at 12:02

## 2025-02-05 RX ADMIN — SODIUM CHLORIDE: 9 INJECTION, SOLUTION INTRAVENOUS at 11:02

## 2025-02-05 RX ADMIN — ATROPINE SULFATE 0.4 MG: 0.4 INJECTION, SOLUTION INTRAVENOUS at 12:02

## 2025-02-05 RX ADMIN — DEXAMETHASONE SODIUM PHOSPHATE 0.25 MG: 4 INJECTION, SOLUTION INTRA-ARTICULAR; INTRALESIONAL; INTRAMUSCULAR; INTRAVENOUS; SOFT TISSUE at 11:02

## 2025-02-05 RX ADMIN — FLUOROURACIL 3500 MG: 50 INJECTION, SOLUTION INTRAVENOUS at 02:02

## 2025-02-05 NOTE — PLAN OF CARE
Pt tolerated Irinotican with no issues. CADD pump connected to port with 100ml of 5FU @ 2.2 cc/hr for 46 hours. RTC on 2/7/25 @ 1215. Pt left unit with 99.9ml remaining in reservoir. NAD.

## 2025-02-05 NOTE — PROGRESS NOTES
[facility]  [facility]  Hematology/Medical Oncology Clinic     PATIENT: Javier Downs  MRN: [id number]  DATE: 2/5/2025    Diagnosis: Transverse colon metastatic cancer to the liver     Oncological history:   04/20/2021: metastatic colon cancer to the liver, moderately differentiated, pMMR  05/06/2021: C1 mFOLFOX + Pema  05/20/2021: C2 mFOLFOX + Pema  06/03/2021: C3 mFOLFOX + Pema   06/17/2021: C4 mFOLFOX + Pema  07/01/2021: C5 mFOLFOX + Pema  07/15/2021: C6 mFOLFOX + Pema  Restaging CT CAP: significant improvement of lesions   07/29/2021: C7 mFOLFOX + Pema   08/12/2021: Switched to maintenance  5FU+Pema (C8)  06/23/2022: C30 maintenance 5FU+Peam  10/20/2022: R hemicolectomy with Dr. Bonilla  12/30/2022: Y-90 to R liver  1/27/2023: Y-90 to R liver  5/17/2023: Y-90 to R liver  7/11/2023: C1 FOLFIRI + Pema  7/26/2023: C2 FOLFIRI + Pema  8/8/2023: C3 FOLFIRI + Pema  8/22/23: C4 FOLFIRI + Pema  Restaging CT CAP 9/1/23: Good response to chemo.  9/7/23: C5 FOLFIRI + Pema  ......................................  Restaging CT CAP 10/12/23: Good response to chemo  10/16/23: C8 FOLFIRI + Pema  .......................................  Restaging CT CAP 12/8/23: Good response to chemo  12/11/23: C12 FOLFIRI + Pema  .......................................  Restaging CT CAP 2/5/24: Good response to chemo  2/8/24: C16 FOLFIRI + Pema    Restaging CT CAP 4/26/24: Good response to chemo  Restaging CT CAP 6/6/24: Good response to chemo  Restaging CT CAP 8/1/24: Stable disease  Restaging CT CAP 10/11/24: Progression of R liver lobe mass.  Plan for Y-90 to this.  Restaging CT CAP 12/20/24: No new extrahepatic disease.  Plan for MWA to left lobe lesion.      Interval History:   He presents today with his daughter prior to cycle 38 of FOLFIRI (off Avastin due to GIB). Doing well overall. Missed chemo x 2 weeks ago grover/t snow. Took his BP medication this AM but plans to hold it until his procedure next week. Appetite has been  good and maintaining hydration. No significant nausea or bowel changes. No overt bleeding. Breathing improved. States he was able to walk from garage without stopping and didn't feel winded. Scheduled for MWA next week.     ECOG status is 1. Presents with his daughter today.    Past Medical History:   Past Medical History:   Diagnosis Date    MARIA A (acute kidney injury) 2022    Hypertension     Hypotension 2025    Metastatic colon cancer to liver 2021     Past Surgical HIstory:   Past Surgical History:   Procedure Laterality Date    COLONOSCOPY N/A 2021    Procedure: COLONOSCOPY with possible stent;  Surgeon: EDILMA Bonilla MD;  Location: [facility] ENDO (2ND FLR);  Service: Colon and Rectal;  Laterality: N/A;    COLONOSCOPY N/A 11/3/2023    Procedure: COLONOSCOPY;  Surgeon: EDILMA Bonilla MD;  Location: [facility] (4TH FLR);  Service: Endoscopy;  Laterality: N/A;  prep instructions sent to pt via portal  From: EDILMA Bonilla MD  Procedure: Colonoscopy  Diagnosis: Surveillance colonoscopy - Hx of colon cancer  Procedure Timin-12 weeks  Provider: Myself  Location: [facility] 4-Endo  Additional Scheduling Information: No scheduling con    COLONOSCOPY N/A 7/10/2024    Procedure: COLONOSCOPY;  Surgeon: Tam Pantoja MD;  Location: [facility] ENDO (2ND FLR);  Service: Endoscopy;  Laterality: N/A;    DIAGNOSTIC LAPAROSCOPY N/A 2022    Procedure: LAPAROSCOPY, DIAGNOSTIC;  Surgeon: Adrian Rodriguez MD;  [facility];  Service: General;  Laterality: N/A;    INSERTION OF TUNNELED CENTRAL VENOUS CATHETER (CVC) WITH SUBCUTANEOUS PORT N/A 5/3/2021    Procedure: [id number];  Surgeon: Francisco Layton MD;  Location: [facility] OR 2ND FLR;  Service: Vascular;  Laterality: N/A;    OMENTECTOMY N/A 10/20/2022    Procedure: OMENTECTOMY;  Surgeon: EDILMA Bonilla MD;  [facility];  Service: Colon and Rectal;  Laterality: N/A;    RIGHT HEMICOLECTOMY N/A 10/20/2022    Procedure:  HEMICOLECTOMY, RIGHT, extended;  Surgeon: EDILMA Bonilla MD;  Location: [facility] OR [address] [place] [address];  Service: Colon and Rectal;  Laterality: N/A;  Extended right hemicolectomy CONSENT IN AM     Family History:   Family History   Problem Relation Name Age of Onset    Cancer Brother         Social History:  reports that he has never smoked. He has never used smokeless tobacco. He reports that he does not currently use alcohol. He reports that he does not use drugs.    Allergies:  Review of patient's allergies indicates:  No Known Allergies    Medications:  Current Outpatient Medications   Medication Sig Dispense Refill    acetaminophen (TYLENOL) 500 MG tablet Take 1 tablet (500 mg total) by mouth every 6 (six) hours as needed for Pain (alternate with ibuprofen).  0    amLODIPine (NORVASC) 10 MG tablet Take 1 tablet (10 mg total) by mouth once daily. 90 tablet 3    LIDOcaine-prilocaine (EMLA) cream Apply topically as needed (port application). 30 g 3    ondansetron (ZOFRAN) 4 MG tablet Take 1 tablet (4 mg total) by mouth every 8 (eight) hours as needed for Nausea. 30 tablet 3    oxyCODONE (ROXICODONE) 5 MG immediate release tablet Take 1 tablet (5 mg total) by mouth every 6 (six) hours as needed for Pain. 20 tablet 0    pantoprazole (PROTONIX) 40 MG tablet Take 1 tablet (40 mg total) by mouth 2 (two) times daily before meals. 180 tablet 3    tamsulosin (FLOMAX) 0.4 mg Cap Take 1 capsule (0.4 mg total) by mouth once daily. 30 capsule 5    traMADoL (ULTRAM) 50 mg tablet Take 1 tablet (50 mg total) by mouth every 6 (six) hours as needed for Pain. 30 tablet 0     No current facility-administered medications for this visit.     Review of Systems   Constitutional:  Positive for fatigue. Negative for activity change and appetite change.   Eyes:  Negative for visual disturbance.   Respiratory:  Positive for shortness of breath. Negative for cough.    Cardiovascular:  Negative for chest pain and leg swelling.  "  Gastrointestinal:  Positive for abdominal distention. Negative for abdominal pain, blood in stool, constipation, diarrhea, nausea and vomiting.   Musculoskeletal:  Negative for arthralgias and back pain.   Skin:  Negative for wound.   Neurological:  Negative for dizziness, weakness and numbness.   Psychiatric/Behavioral:  Negative for confusion and sleep disturbance. The patient is not nervous/anxious.    All other systems reviewed and are negative.    ECOG Performance Status: 1     Objective:      Vitals:   Vitals:    02/05/25 1052   BP: 98/64   BP Location: Left arm   Patient Position: Sitting   Pulse: 104   Resp: 14   SpO2: 98%   Weight: 53.1 kg (117 lb 2.8 oz)   Height: 5' 7" (1.702 m)       Physical Exam  Vitals reviewed.   Constitutional:       General: He is not in acute distress.     Comments: Chronically ill appearing   HENT:      Head: Normocephalic and atraumatic.      Right Ear: External ear normal.      Left Ear: External ear normal.      Nose: Nose normal.      Mouth/Throat:      Pharynx: Oropharynx is clear.   Eyes:      General: No scleral icterus.     Extraocular Movements: Extraocular movements intact.      Conjunctiva/sclera: Conjunctivae normal.   Cardiovascular:      Rate and Rhythm: Normal rate and regular rhythm.      Pulses: Normal pulses.      Heart sounds: Normal heart sounds. No murmur heard.  Pulmonary:      Effort: Pulmonary effort is normal. No respiratory distress.      Breath sounds: Normal breath sounds. No wheezing.   Chest:      Comments: Port to RCW, no signs of infection.  Abdominal:      General: Abdomen is flat. Bowel sounds are normal. There is distension (mild).      Palpations: Abdomen is soft. There is no mass.      Tenderness: There is no abdominal tenderness.      Comments: Vertical midline abdominal wound well healed   Musculoskeletal:         General: No swelling or deformity. Normal range of motion.      Right lower leg: No edema.      Left lower leg: No edema. "   Skin:     Coloration: Skin is not jaundiced or pale.      Findings: No bruising, erythema or rash.   Neurological:      General: No focal deficit present.      Mental Status: He is alert and oriented to person, place, and time. Mental status is at baseline.      Cranial Nerves: No cranial nerve deficit.      Sensory: No sensory deficit.      Gait: Gait normal.   Psychiatric:         Mood and Affect: Mood normal.         Behavior: Behavior normal.         Thought Content: Thought content normal.         Judgment: Judgment normal.   Laboratory Data:  No visits with results within 1 Week(s) from this visit.   Latest known visit with results is:   Lab Visit on 01/06/2025   Component Date Value Ref Range Status    WBC 01/06/2025 21.76 (H)  3.90 - 12.70 K/uL Final    RBC 01/06/2025 3.88 (L)  4.60 - 6.20 M/uL Final    Hemoglobin 01/06/2025 10.3 (L)  14.0 - 18.0 g/dL Final    Hematocrit 01/06/2025 33.7 (L)  40.0 - 54.0 % Final    MCV 01/06/2025 87  82 - 98 fL Final    MCH 01/06/2025 26.5 (L)  27.0 - 31.0 pg Final    MCHC 01/06/2025 30.6 (L)  32.0 - 36.0 g/dL Final    RDW 01/06/2025 20.8 (H)  11.5 - 14.5 % Final    Platelets 01/06/2025 293  150 - 450 K/uL Final    MPV 01/06/2025 10.4  9.2 - 12.9 fL Final    Immature Granulocytes 01/06/2025 1.2 (H)  0.0 - 0.5 % Final    Gran # (ANC) 01/06/2025 16.6 (H)  1.8 - 7.7 K/uL Final    Immature Grans (Abs) 01/06/2025 0.27 (H)  0.00 - 0.04 K/uL Final    Comment: Mild elevation in immature granulocytes is non specific and   can be seen in a variety of conditions including stress response,   acute inflammation, trauma and pregnancy. Correlation with other   laboratory and clinical findings is essential.      Lymph # 01/06/2025 1.7  1.0 - 4.8 K/uL Final    Mono # 01/06/2025 2.2 (H)  0.3 - 1.0 K/uL Final    Eos # 01/06/2025 0.6 (H)  0.0 - 0.5 K/uL Final    Baso # 01/06/2025 0.37 (H)  0.00 - 0.20 K/uL Final    nRBC 01/06/2025 0  0 /100 WBC Final    Gran % 01/06/2025  76.4 (H)  38.0 - 73.0 % Final    Lymph % 01/06/2025 7.9 (L)  18.0 - 48.0 % Final    Mono % 01/06/2025 10.2  4.0 - 15.0 % Final    Eosinophil % 01/06/2025 2.6  0.0 - 8.0 % Final    Basophil % 01/06/2025 1.7  0.0 - 1.9 % Final    Platelet Estimate 01/06/2025 Appears normal   Final    Differential Method 01/06/2025 Automated   Final    Sodium 01/06/2025 138  136 - 145 mmol/L Final    Potassium 01/06/2025 3.9  3.5 - 5.1 mmol/L Final    Chloride 01/06/2025 104  95 - 110 mmol/L Final    CO2 01/06/2025 22 (L)  23 - 29 mmol/L Final    Glucose 01/06/2025 94  70 - 110 mg/dL Final    BUN 01/06/2025 11  8 - 23 mg/dL Final    Creatinine 01/06/2025 0.7  0.5 - 1.4 mg/dL Final    Calcium 01/06/2025 8.6 (L)  8.7 - 10.5 mg/dL Final    Total Protein 01/06/2025 6.7  6.0 - 8.4 g/dL Final    Albumin 01/06/2025 2.4 (L)  3.5 - 5.2 g/dL Final    Total Bilirubin 01/06/2025 1.1 (H)  0.1 - 1.0 mg/dL Final    Comment: For infants and newborns, interpretation of results should be based  on gestational age, weight and in agreement with clinical  observations.    Premature Infant recommended reference ranges:  Up to 24 hours.............<8.0 mg/dL  Up to 48 hours............<12.0 mg/dL  3-5 days..................<15.0 mg/dL  6-29 days.................<15.0 mg/dL      Alkaline Phosphatase 01/06/2025 173 (H)  40 - 150 U/L Final    AST 01/06/2025 26  10 - 40 U/L Final    ALT 01/06/2025 7 (L)  10 - 44 U/L Final    eGFR 01/06/2025 >60.0  >60 mL/min/1.73 m^2 Final    Anion Gap 01/06/2025 12  8 - 16 mmol/L Final    CEA 01/06/2025 38.1 (H)  0.0 - 5.0 ng/mL Final    Comment: CEA Normal Range:  Non-Smokers: 0-3.0 ng/mL  Smokers:     0-5.0 ng/mL    The testing method is a chemiluminescent microparticle immunoassay   manufactured by [employer] and performed on the Ciplex   or   Referrizer system. Values obtained with different assay manufacturers   for   methods may be different and cannot be used interchangeably.       Group & Rh 01/06/2025 A POS   Final    Indirect Yolie 01/06/2025 POS (A)   Final    Specimen Outdate 01/06/2025 01/09/2025 23:59   Final    Antibody ID 01/06/2025 POS   Final    Undetermined Specificity     Assessment and Plan        1. Metastatic colon cancer to liver    2. Immunodeficiency secondary to neoplasm    3. Immunodeficiency due to chemotherapy    4. Chemotherapy induced neutropenia    5. Lower GI bleed    6. Anemia associated with chemotherapy    7. Weight decrease    8. Neoplasm related pain    9. Drug-induced constipation    10. Severe malnutrition    11. Hypotension, unspecified hypotension type    12. Lower urinary tract symptoms (LUTS)    13. Dehydration        1-6.  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  Guardant 360 was negative for BRAF, VIRI, HER2 amplification.   Pretreatment CEA 57.  We had a long and sonia discussion with him about his diagnosis. Unfortunately, the disease is not curable but remains treatable and he has good performance status.   Restaging scans after 7 cycles of FOLFOX + Pema showed significant reduction in colonic mass and liver mass.   we switched to maintenance as of cycle 8 with 5FU + Pema  Restaging scans from March 2022 show stable disease.   MRI on 7/18/22 showing hepatic progression of disease in the right hepatic lobe noted on the exam. CT CAP on 8/26 shows some mild progression in both liver metastases.    Discussed case at colorectal tumor board 8/31/22 and had a diagnostic lap performed by Dr. Rodriguez on 9/7 to assess for any occult peritoneal disease. Findings from the diagnostic lap were negative. Fluid from around from around the primary mass was sent for cytology that was negative for malignancy.   Underwent primary tumor resection on 10/20/22 with Dr. Bonilla.    Meanwhile his liver mets had grown.  We discussed his case at CRC conference with plans for short term Y-90 and then restarting chemotherapy prior to  considering any surgical options to his liver metastases.  He underwent Y-90 delivery on 12/30/22. Tolerated well.   CT CAP on 1/23/23 reviewed at CRC conference with good response to Y-90, no new lesions.  Underwent second Y-90 on 1/27/23. Can consider R hepatectomy pending follow-up imaging after Y-90.     Received cycle 42 of FOLFOX (omitted oxaliplatin again starting cycle 41 due to neuropathy) on 2/9/23.  Because of 5-FU shortage nationally, we have been holding chemotherapy since mid-February 2023.  If he needs to restart chemotherapy (I.e. no plans for surgical resection), will place him on capecitabine maintenance for duration of 5-FU shortage.     Discussed at colorectal liver mets tumor board 3/16/23.  Plan for repeat Y-90 and then consideration of surgical resection and he will meet with liver transplant team as well.  Underwent Y-90 delivery 5/17/23 with IR.     Has been on maintenance Xeloda since late April 2023.    Met with liver transplant team but ultimately opted not to proceed with transplant as he was concerned about how he would tolerate a major surgery with the life changes that would come after transplant as well. They closed out his case.    He met with Dr. Rodriguez to discuss whether surgical resection is indicated for his liver mets.  He recommended repeat MRI.  Repeat MRI unfortunately showed disease progression while on Xeloda maintenance.  Similarly his CEA garrett precipitously, all in keeping with worsening disease.    We recommended we restart IV chemotherapy with FOLFIRI + Avastin (never had irinotecan).    Because of hyperbilirubinemia he received cycle 1 with just 5-FU + Avastin on 7/11/23. Tolerated this well. Bilirubin has improved.  Received irinotecan starting with cycle 2.  Repeat CT CAP after cycle 4 shows good response to therapy.   Excellent tolerance. mCR tumor board agrees with continuation of chemotherapy.   Repeat CT CAP after cycle 7 shows stable disease, no evidence of  new or worsening disease.   Repeat CT CAP after cycle 11 shows stable disease.   Repeat CT CAP after cycle 15 shows improved disease.  Repeat CT CAP after cycle 21 shows stable disease.   Repeat CT CAP after cycle 24 shows stable disease.    Hospitalized after cycle 26 and 27 (without Avastin) for hematochezia.  Colonoscopy with likely diverticular bleed.  Required 2 units of blood in last month.  Will continue to hold bevacizumab indefinitely.  Discussed that we don't have a very good way preventing this recurrence and chemo with its associated thrombocytopenia may make this more likely to occur again.  He still wishes to proceed, which is my recommendation as well.    Repeat CT CAP after cycle 28 shows stable disease and recc to continue with chemotherapy.   Repeat CT CAP after cycle 32 shows progression of R liver lobe metastasis with rising CEA.  Will plan for Y-90 to growing lesion.  Underwent mapping 11/5/24 and delivery 11/21/24.  Plan for L lobe MWA per Dr. Vidal.    CT CAP 12/20/24 shows otherwise stable disease.    Will continue FOLFIRI in the meantime.    Delayed cycle 33 due to neutropenia - has since recovered and on Neulasta.    - I have reviewed the CBC and CMP, adequate for treatment.  - Proceed with cycle 38 of FOLFIRI today + OBI Neulasta.  - Scheduled for MWA on 2/10/25.     RTC in 2 weeks for next cycle.    Tempus xT: APC, CKS1B cng, ERCC3 cnl, PIK3CA; KENIA, TMB 12.1.  Tempus xF: APC, KRAS Q61H, PIK3CA, TP53; KENIA    7-9. Neoplasm related pain, weight loss, constipation, malnutrition  -- Pain very well controlled. Has oxycodone 5mg to use PRN but not requiring now.  -- Following with nutrition. Encouraged increased protein. Monitor.  Weight down slightly but reports good appetite.  -- Continue Miralax daily for constipation. Doing well with this.     11. Hypotension   -- BP low normal today. Asymptomatic.   -- Following with PCP. Holding amlodipine until after procedure. Took meds today.   --  Encouraged him and his daughter to monitor BP and HR closely at home.     12-13. Urinary Hesitancy/dehydration  -- Continue Flomax.   -- Reported improvement since starting medication.     RTC in 2 weeks    Patient is in agreement with the proposed treatment plan. All questions were answered to the patient's satisfaction. Pt knows to call clinic if anything is needed before the next clinic visit.    Patient discussed with collaborating physician, Dr. Schuster.    At least 40 minutes were spent today on this encounter including face to face time with the patient, data gathering/interpretation and documentation.       Natividad Maher, MSN, APRN, [facility]-  Hematology and Medical Oncology  Clinical Nurse Specialist to Dr. Schuster, Dr. Quijano & Dr. Mueller    Route Chart for Scheduling    Med Onc Chart Routing      Follow up with physician 2 weeks and 6 weeks. with labs for chemo. please schedule all appts in 1 day. scans 1-2 days prior to MD visit in 6 weeks.   Follow up with RACHEL 4 weeks. with labs for chemo   Infusion scheduling note   chemo every 2 weeks, pump d/c on day 3   Injection scheduling note    Labs CBC, CMP and CEA   Scheduling:  Preferred lab:  Lab interval: every 2 weeks     Imaging CT chest abdomen pelvis   1-2 days prior to MD visit in 6 weeks   Pharmacy appointment    Other referrals

## 2025-02-05 NOTE — PLAN OF CARE
Pt sitting in chair, VSS, assessment complete. Port accessed flushed with blood return infusing NS @ 25 cc/hr while waiting for Irinotican. WCTM for safety.

## 2025-02-07 ENCOUNTER — INFUSION (OUTPATIENT)
Dept: INFUSION THERAPY | Facility: HOSPITAL | Age: 75
End: 2025-02-07
Payer: MEDICARE

## 2025-02-07 VITALS — SYSTOLIC BLOOD PRESSURE: 103 MMHG | DIASTOLIC BLOOD PRESSURE: 65 MMHG | RESPIRATION RATE: 18 BRPM | HEART RATE: 112 BPM

## 2025-02-07 DIAGNOSIS — I95.9 HYPOTENSION, UNSPECIFIED HYPOTENSION TYPE: ICD-10-CM

## 2025-02-07 DIAGNOSIS — C78.7 METASTATIC COLON CANCER TO LIVER: Primary | ICD-10-CM

## 2025-02-07 DIAGNOSIS — C18.9 METASTATIC COLON CANCER TO LIVER: Primary | ICD-10-CM

## 2025-02-07 PROCEDURE — 25000003 PHARM REV CODE 250: Mod: HCNC | Performed by: REGISTERED NURSE

## 2025-02-07 PROCEDURE — 63600175 PHARM REV CODE 636 W HCPCS: Mod: JZ,TB,HCNC | Performed by: REGISTERED NURSE

## 2025-02-07 PROCEDURE — A4216 STERILE WATER/SALINE, 10 ML: HCPCS | Mod: HCNC | Performed by: REGISTERED NURSE

## 2025-02-07 PROCEDURE — 96377 APPLICATON ON-BODY INJECTOR: CPT | Mod: HCNC

## 2025-02-07 RX ORDER — SODIUM CHLORIDE 0.9 % (FLUSH) 0.9 %
10 SYRINGE (ML) INJECTION
Status: DISCONTINUED | OUTPATIENT
Start: 2025-02-07 | End: 2025-02-07 | Stop reason: HOSPADM

## 2025-02-07 RX ORDER — HEPARIN 100 UNIT/ML
500 SYRINGE INTRAVENOUS
Status: DISCONTINUED | OUTPATIENT
Start: 2025-02-07 | End: 2025-02-07 | Stop reason: HOSPADM

## 2025-02-07 RX ADMIN — PEGFILGRASTIM 6 MG: KIT SUBCUTANEOUS at 01:02

## 2025-02-07 RX ADMIN — Medication 10 ML: at 01:02

## 2025-02-07 NOTE — PLAN OF CARE
1322 Pt tolerated home infusion well, no complaints or complications reported, VSS, vessel empty upon arrival. Pt disconnected from pump, positive blood return noted. Pt aware to call provider with any questions or concerns, aware of upcoming appts, ambulatory from clinic with steady gait, no distress noted.

## 2025-02-10 ENCOUNTER — ANESTHESIA (OUTPATIENT)
Dept: INTERVENTIONAL RADIOLOGY/VASCULAR | Facility: HOSPITAL | Age: 75
End: 2025-02-10
Payer: MEDICARE

## 2025-02-10 ENCOUNTER — HOSPITAL ENCOUNTER (OUTPATIENT)
Dept: INTERVENTIONAL RADIOLOGY/VASCULAR | Facility: HOSPITAL | Age: 75
Discharge: HOME | End: 2025-02-10
Admitting: RADIOLOGY
Payer: MEDICARE

## 2025-02-10 DIAGNOSIS — C78.7 METASTATIC COLON CANCER TO LIVER: ICD-10-CM

## 2025-02-10 DIAGNOSIS — C18.9 METASTATIC COLON CANCER TO LIVER: ICD-10-CM

## 2025-02-10 DIAGNOSIS — R16.0 LIVER MASSES: Primary | ICD-10-CM

## 2025-02-10 PROCEDURE — 63600175 PHARM REV CODE 636 W HCPCS: Mod: HCNC | Performed by: ANESTHESIOLOGY

## 2025-02-10 PROCEDURE — 37000008 HC ANESTHESIA 1ST 15 MINUTES: Mod: HCNC

## 2025-02-10 PROCEDURE — 77013 CT GUIDE FOR TISSUE ABLATION: CPT | Mod: TC,HCNC

## 2025-02-10 PROCEDURE — 47382 PERCUT ABLATE LIVER RF: CPT | Mod: HCNC | Performed by: RADIOLOGY

## 2025-02-10 PROCEDURE — 25000003 PHARM REV CODE 250: Mod: HCNC | Performed by: RADIOLOGY

## 2025-02-10 PROCEDURE — 77013 CT GUIDE FOR TISSUE ABLATION: CPT | Mod: TC,HCNC | Performed by: RADIOLOGY

## 2025-02-10 PROCEDURE — 25000003 PHARM REV CODE 250: Mod: HCNC | Performed by: REGISTERED NURSE

## 2025-02-10 PROCEDURE — 76940 US GUIDE TISSUE ABLATION: CPT | Mod: TC,59,HCNC | Performed by: RADIOLOGY

## 2025-02-10 PROCEDURE — 37000009 HC ANESTHESIA EA ADD 15 MINS: Mod: HCNC

## 2025-02-10 PROCEDURE — 76940 US GUIDE TISSUE ABLATION: CPT | Mod: 26,59,HCNC, | Performed by: RADIOLOGY

## 2025-02-10 PROCEDURE — D9220A PRA ANESTHESIA: Mod: HCNC,ANES,, | Performed by: ANESTHESIOLOGY

## 2025-02-10 PROCEDURE — 63600175 PHARM REV CODE 636 W HCPCS: Mod: HCNC | Performed by: REGISTERED NURSE

## 2025-02-10 PROCEDURE — 77013 CT GUIDE FOR TISSUE ABLATION: CPT | Mod: 26,HCNC,, | Performed by: RADIOLOGY

## 2025-02-10 PROCEDURE — D9220A PRA ANESTHESIA: Mod: HCNC,CRNA,, | Performed by: REGISTERED NURSE

## 2025-02-10 PROCEDURE — C2618 PROBE/NEEDLE, CRYO: HCPCS | Mod: HCNC

## 2025-02-10 RX ORDER — AMOXICILLIN AND CLAVULANATE POTASSIUM 875; 125 MG/1; MG/1
1 TABLET, FILM COATED ORAL 2 TIMES DAILY
Qty: 14 TABLET | Refills: 0 | Status: SHIPPED | OUTPATIENT
Start: 2025-02-10

## 2025-02-10 RX ORDER — ONDANSETRON HYDROCHLORIDE 2 MG/ML
INJECTION, SOLUTION INTRAVENOUS
Status: DISCONTINUED | OUTPATIENT
Start: 2025-02-10 | End: 2025-02-10

## 2025-02-10 RX ORDER — OXYCODONE AND ACETAMINOPHEN 5; 325 MG/1; MG/1
1 TABLET ORAL EVERY 4 HOURS PRN
Qty: 25 TABLET | Refills: 0 | Status: SHIPPED | OUTPATIENT
Start: 2025-02-10

## 2025-02-10 RX ORDER — LIDOCAINE HYDROCHLORIDE 10 MG/ML
1 INJECTION, SOLUTION EPIDURAL; INFILTRATION; INTRACAUDAL; PERINEURAL ONCE
Status: DISCONTINUED | OUTPATIENT
Start: 2025-02-10 | End: 2025-02-11 | Stop reason: HOSPADM

## 2025-02-10 RX ORDER — CEFAZOLIN SODIUM 1 G/3ML
INJECTION, POWDER, FOR SOLUTION INTRAMUSCULAR; INTRAVENOUS
Status: DISCONTINUED | OUTPATIENT
Start: 2025-02-10 | End: 2025-02-10

## 2025-02-10 RX ORDER — GLUCAGON 1 MG
1 KIT INJECTION
Status: DISCONTINUED | OUTPATIENT
Start: 2025-02-10 | End: 2025-02-11 | Stop reason: HOSPADM

## 2025-02-10 RX ORDER — ONDANSETRON HYDROCHLORIDE 2 MG/ML
4 INJECTION, SOLUTION INTRAVENOUS EVERY 6 HOURS PRN
Status: DISCONTINUED | OUTPATIENT
Start: 2025-02-10 | End: 2025-02-11 | Stop reason: HOSPADM

## 2025-02-10 RX ORDER — OXYCODONE HYDROCHLORIDE 5 MG/1
5 TABLET ORAL EVERY 4 HOURS PRN
Status: DISCONTINUED | OUTPATIENT
Start: 2025-02-10 | End: 2025-02-11 | Stop reason: HOSPADM

## 2025-02-10 RX ORDER — FENTANYL CITRATE 50 UG/ML
INJECTION, SOLUTION INTRAMUSCULAR; INTRAVENOUS
Status: DISCONTINUED | OUTPATIENT
Start: 2025-02-10 | End: 2025-02-10

## 2025-02-10 RX ORDER — SODIUM CHLORIDE 9 MG/ML
INJECTION, SOLUTION INTRAVENOUS CONTINUOUS
Status: DISCONTINUED | OUTPATIENT
Start: 2025-02-10 | End: 2025-02-11 | Stop reason: HOSPADM

## 2025-02-10 RX ORDER — FENTANYL CITRATE 50 UG/ML
25 INJECTION, SOLUTION INTRAMUSCULAR; INTRAVENOUS EVERY 5 MIN PRN
Status: DISCONTINUED | OUTPATIENT
Start: 2025-02-10 | End: 2025-02-11 | Stop reason: HOSPADM

## 2025-02-10 RX ORDER — PHENYLEPHRINE HYDROCHLORIDE 10 MG/ML
INJECTION INTRAVENOUS
Status: DISCONTINUED | OUTPATIENT
Start: 2025-02-10 | End: 2025-02-10

## 2025-02-10 RX ORDER — PROPOFOL 10 MG/ML
VIAL (ML) INTRAVENOUS
Status: DISCONTINUED | OUTPATIENT
Start: 2025-02-10 | End: 2025-02-10

## 2025-02-10 RX ORDER — ROCURONIUM BROMIDE 10 MG/ML
INJECTION, SOLUTION INTRAVENOUS
Status: DISCONTINUED | OUTPATIENT
Start: 2025-02-10 | End: 2025-02-10

## 2025-02-10 RX ORDER — DEXAMETHASONE SODIUM PHOSPHATE 4 MG/ML
INJECTION, SOLUTION INTRA-ARTICULAR; INTRALESIONAL; INTRAMUSCULAR; INTRAVENOUS; SOFT TISSUE
Status: DISCONTINUED | OUTPATIENT
Start: 2025-02-10 | End: 2025-02-10

## 2025-02-10 RX ORDER — DEXMEDETOMIDINE HYDROCHLORIDE 100 UG/ML
INJECTION, SOLUTION INTRAVENOUS
Status: DISCONTINUED | OUTPATIENT
Start: 2025-02-10 | End: 2025-02-10

## 2025-02-10 RX ORDER — LIDOCAINE HYDROCHLORIDE 20 MG/ML
INJECTION INTRAVENOUS
Status: DISCONTINUED | OUTPATIENT
Start: 2025-02-10 | End: 2025-02-10

## 2025-02-10 RX ORDER — SODIUM CHLORIDE 0.9 % (FLUSH) 0.9 %
10 SYRINGE (ML) INJECTION
Status: DISCONTINUED | OUTPATIENT
Start: 2025-02-10 | End: 2025-02-11 | Stop reason: HOSPADM

## 2025-02-10 RX ORDER — ONDANSETRON 4 MG/1
4 TABLET, FILM COATED ORAL EVERY 8 HOURS PRN
Qty: 30 TABLET | Refills: 0 | Status: SHIPPED | OUTPATIENT
Start: 2025-02-10

## 2025-02-10 RX ADMIN — PROPOFOL 80 MG: 10 INJECTION, EMULSION INTRAVENOUS at 07:02

## 2025-02-10 RX ADMIN — SODIUM CHLORIDE: 0.9 INJECTION, SOLUTION INTRAVENOUS at 07:02

## 2025-02-10 RX ADMIN — FENTANYL CITRATE 25 MCG: 50 INJECTION INTRAMUSCULAR; INTRAVENOUS at 09:02

## 2025-02-10 RX ADMIN — OXYCODONE 5 MG: 5 TABLET ORAL at 09:02

## 2025-02-10 RX ADMIN — SUGAMMADEX 200 MG: 100 INJECTION, SOLUTION INTRAVENOUS at 08:02

## 2025-02-10 RX ADMIN — FENTANYL CITRATE 50 MCG: 50 INJECTION, SOLUTION INTRAMUSCULAR; INTRAVENOUS at 07:02

## 2025-02-10 RX ADMIN — FENTANYL CITRATE 25 MCG: 50 INJECTION, SOLUTION INTRAMUSCULAR; INTRAVENOUS at 08:02

## 2025-02-10 RX ADMIN — PHENYLEPHRINE HYDROCHLORIDE 100 MCG: 10 INJECTION INTRAVENOUS at 07:02

## 2025-02-10 RX ADMIN — CEFAZOLIN 2 G: 330 INJECTION, POWDER, FOR SOLUTION INTRAMUSCULAR; INTRAVENOUS at 08:02

## 2025-02-10 RX ADMIN — ROCURONIUM BROMIDE 50 MG: 10 INJECTION, SOLUTION INTRAVENOUS at 07:02

## 2025-02-10 RX ADMIN — DEXAMETHASONE SODIUM PHOSPHATE 4 MG: 4 INJECTION, SOLUTION INTRAMUSCULAR; INTRAVENOUS at 07:02

## 2025-02-10 RX ADMIN — ONDANSETRON 4 MG: 2 INJECTION INTRAMUSCULAR; INTRAVENOUS at 08:02

## 2025-02-10 RX ADMIN — DEXMEDETOMIDINE 4 MCG: 200 INJECTION, SOLUTION INTRAVENOUS at 08:02

## 2025-02-10 RX ADMIN — LIDOCAINE HYDROCHLORIDE 80 MG: 20 INJECTION INTRAVENOUS at 07:02

## 2025-02-10 NOTE — TRANSFER OF CARE
"Anesthesia Transfer of Care Note    Patient: Javier Downs    Procedure(s) Performed: * No procedures listed *    Patient location: PACU    Anesthesia Type: general    Transport from OR: Transported from OR on 6-10 L/min O2 by face mask with adequate spontaneous ventilation    Post pain: adequate analgesia    Post assessment: no apparent anesthetic complications and tolerated procedure well    Post vital signs: stable    Level of consciousness: sedated and responds to stimulation    Nausea/Vomiting: no nausea/vomiting    Complications: none    Transfer of care protocol was followed    Last vitals: Visit Vitals  /72   Pulse 105   Temp 36.9 °C (98.4 °F) (Temporal)   Resp 16   Ht 5' 7" (1.702 m)   Wt 54.4 kg (120 lb)   SpO2 100%   BMI 18.79 kg/m²     "

## 2025-02-10 NOTE — Clinical Note
Problem: Adult Inpatient Plan of Care  Goal: Plan of Care Review  Outcome: Ongoing, Progressing  Flowsheets (Taken 9/16/2020 1758)  Progress: improving  Plan of Care Reviewed With: patient  Outcome Summary: Patient is a term induction on 8 mu/mL of pitocin. Last SVE 1.5/70/-2.  Goal: Patient-Specific Goal (Individualized)  Outcome: Ongoing, Progressing  Flowsheets (Taken 9/16/2020 1758)  Patient-Specific Goals (Include Timeframe): epidural for pain control  Individualized Care Needs: bottlefeeding  Anxieties, Fears or Concerns: none  Goal: Absence of Hospital-Acquired Illness or Injury  Outcome: Ongoing, Progressing  Intervention: Identify and Manage Fall Risk  Recent Flowsheet Documentation  Taken 9/16/2020 1400 by Pina Holden RN  Safety Promotion/Fall Prevention: safety round/check completed  Goal: Optimal Comfort and Wellbeing  Outcome: Ongoing, Progressing  Goal: Readiness for Transition of Care  Outcome: Ongoing, Progressing  Intervention: Mutually Develop Transition Plan  Recent Flowsheet Documentation  Taken 9/16/2020 1345 by Pina Holden, RN  Equipment Currently Used at Home: none  Taken 9/16/2020 1342 by Pina Holden, RN  Transportation Anticipated: family or friend will provide  Transportation Concerns: rides, unreliable from others  Patient/Family Anticipated Services at Transition:   Patient/Family Anticipates Transition to: home with family     Problem: Bleeding (Labor)  Goal: Hemostasis  Outcome: Ongoing, Progressing     Problem: Change in Fetal Wellbeing (Labor)  Goal: Stable Fetal Wellbeing  Outcome: Ongoing, Progressing     Problem: Delayed Labor Progression (Labor)  Goal: Effective Progression to Delivery  Outcome: Ongoing, Progressing     Problem: Infection (Labor)  Goal: Absence of Infection Signs and Symptoms  Outcome: Ongoing, Progressing     Problem: Labor Pain (Labor)  Goal: Acceptable Pain Control  Outcome: Ongoing, Progressing     Problem: Uterine Tachysystole (Labor)  Goal:  Anesthesia Type: Anesthesia Normal Uterine Contraction Pattern  Outcome: Ongoing, Progressing   Goal Outcome Evaluation:  Plan of Care Reviewed With: patient  Progress: improving  Outcome Summary: Patient is a term induction on 8 mu/mL of pitocin. Last SVE 1.5/70/-2.

## 2025-02-10 NOTE — H&P
Radiology History & Physical      SUBJECTIVE:     Chief Complaint: metastatic colon cancer; liver mets    History of Present Illness:  Javier Downs is a 74 y.o. male with metastatic colon cancer to the liver s/p y90 on 2022, 2023, 2023, and most recently 2024. He presents today for microwave ablation of left hepatic lobe lesion.     Past Medical History:   Diagnosis Date    MARIA A (acute kidney injury) 2022    Hypertension     Hypotension 2025    Metastatic colon cancer to liver 2021     Past Surgical History:   Procedure Laterality Date    COLONOSCOPY N/A 2021    Procedure: COLONOSCOPY with possible stent;  Surgeon: EDILMA Bonilla MD;  Location: [facility] (2ND FLR);  Service: Colon and Rectal;  Laterality: N/A;    COLONOSCOPY N/A 11/3/2023    Procedure: COLONOSCOPY;  Surgeon: EDILMA Bonilla MD;  Location: [facility] (4TH FLR);  Service: Endoscopy;  Laterality: N/A;  prep instructions sent to pt via portal  From: EDILMA Bonilla MD  Procedure: Colonoscopy  Diagnosis: Surveillance colonoscopy - Hx of colon cancer  Procedure Timin-12 weeks  Provider: Myself  Location: [facility] 4Endo  Additional Scheduling Information: No scheduling con    COLONOSCOPY N/A 7/10/2024    Procedure: COLONOSCOPY;  Surgeon: Tam Pantoja MD;  Location: [facility] ENDO (2ND FLR);  Service: Endoscopy;  Laterality: N/A;    DIAGNOSTIC LAPAROSCOPY N/A 2022    Procedure: LAPAROSCOPY, DIAGNOSTIC;  Surgeon: Adrian Rodriguez MD;  [facility];  Service: General;  Laterality: N/A;    INSERTION OF TUNNELED CENTRAL VENOUS CATHETER (CVC) WITH SUBCUTANEOUS PORT N/A 5/3/2021    Procedure: [id number];  Surgeon: Francisco Layton MD;  [facility];  Service: Vascular;  Laterality: N/A;    OMENTECTOMY N/A 10/20/2022    Procedure: OMENTECTOMY;  Surgeon: EDILMA Bonilla MD;  [facility];  Service: Colon and Rectal;  Laterality: N/A;    RIGHT HEMICOLECTOMY N/A  10/20/2022    Procedure: HEMICOLECTOMY, RIGHT, extended;  Surgeon: EDILMA Bonilla MD;  Location: [facility] OR [address] [place] [address];  Service: Colon and Rectal;  Laterality: N/A;  Extended right hemicolectomy CONSENT IN AM       Home Meds:   Prior to Admission medications    Medication Sig Start Date End Date Taking? Authorizing Provider   amLODIPine (NORVASC) 10 MG tablet Take 1 tablet (10 mg total) by mouth once daily. 11/29/24  Yes Anali Hernandez PA-C   LIDOcaine-prilocaine (EMLA) cream Apply topically as needed (port application). 4/15/24  Yes Anali Hernandez PA-C   ondansetron (ZOFRAN) 4 MG tablet Take 1 tablet (4 mg total) by mouth every 8 (eight) hours as needed for Nausea. 6/8/23  Yes Thomas Monroe MD   oxyCODONE (ROXICODONE) 5 MG immediate release tablet Take 1 tablet (5 mg total) by mouth every 6 (six) hours as needed for Pain. 11/2/22  Yes Elsy Kelly NP   pantoprazole (PROTONIX) 40 MG tablet Take 1 tablet (40 mg total) by mouth 2 (two) times daily before meals. 6/26/24 6/26/25 Yes Max Grijalva MD   tamsulosin (FLOMAX) 0.4 mg Cap Take 1 capsule (0.4 mg total) by mouth once daily. 11/29/24 11/29/25 Yes Anali Hernandez PA-C   traMADoL (ULTRAM) 50 mg tablet Take 1 tablet (50 mg total) by mouth every 6 (six) hours as needed for Pain. 9/16/24  Yes Natividad Maher CNS   acetaminophen (TYLENOL) 500 MG tablet Take 1 tablet (500 mg total) by mouth every 6 (six) hours as needed for Pain (alternate with ibuprofen). 5/3/21   FRANKLIN Delarosa MD     Anticoagulants/Antiplatelets:  reviewed    Allergies: Review of patient's allergies indicates:  No Known Allergies  Sedation History:  no adverse reactions    Review of Systems:   Hematological: no known coagulopathies  Respiratory: no shortness of breath  Cardiovascular: no chest pain  Gastrointestinal: no abdominal pain  Genito-Urinary: no dysuria  Musculoskeletal: negative  Neurological: no TIA or stroke symptoms         OBJECTIVE:     Vital Signs (Most  Recent)  Temp: 99.3 °F (37.4 °C) (02/10/25 0600)  Pulse: 105 (02/10/25 0600)  Resp: 16 (02/10/25 0600)  BP: 101/64 (02/10/25 0612)  SpO2: 100 % (02/10/25 0600)    Physical Exam:  ASA: per anesthesia  Mallampati: per anesthesia    General: no acute distress  Mental Status: alert and oriented to person, place and time  HEENT: normocephalic, atraumatic  Chest: unlabored breathing  Heart: regular heart rate  Abdomen: nondistended  Extremity: moves all extremities    Laboratory  Lab Results   Component Value Date    INR 1.1 02/07/2025       Lab Results   Component Value Date    WBC 6.54 02/07/2025    HGB 9.4 (L) 02/07/2025    HCT 31.8 (L) 02/07/2025    MCV 91 02/07/2025     02/07/2025      Lab Results   Component Value Date    GLU 86 02/05/2025    GLU 86 02/05/2025     02/05/2025     02/05/2025    K 3.7 02/05/2025    K 3.7 02/05/2025     02/05/2025     02/05/2025    CO2 24 02/05/2025    CO2 24 02/05/2025    BUN 8 02/05/2025    BUN 8 02/05/2025    CREATININE 0.6 02/05/2025    CREATININE 0.6 02/05/2025    CALCIUM 8.5 (L) 02/05/2025    CALCIUM 8.5 (L) 02/05/2025    MG 1.7 12/21/2024    ALT 12 02/05/2025    ALT 12 02/05/2025    AST 21 02/05/2025    AST 21 02/05/2025    ALBUMIN 2.3 (L) 02/05/2025    ALBUMIN 2.3 (L) 02/05/2025    BILITOT 1.1 (H) 02/05/2025    BILITOT 1.1 (H) 02/05/2025    BILIDIR 0.5 (H) 02/07/2025       ASSESSMENT/PLAN:     Sedation Plan: per anesthesia  Patient will undergo microwave ablation to Left hepatic lobe lesion.    Written consent was obtained from the patient. All questions answered.     Michael Rowland MD  Radiology PGY-2

## 2025-02-10 NOTE — ANESTHESIA PROCEDURE NOTES
Intubation    Date/Time: 2/10/2025 7:25 AM    Performed by: David Quinteros CRNA  Authorized by: Shaylee Pantoja MD    Intubation:     Induction:  Intravenous    Intubated:  Postinduction    Mask Ventilation:  Easy mask    Attempts:  1    Attempted By:  Staff anesthesiologist    Method of Intubation:  Video laryngoscopy    Blade:  Stack 3    Laryngeal View Grade: Grade I - full view of cords      Difficult Airway Encountered?: No      Complications:  None    Airway Device:  Oral endotracheal tube    Airway Device Size:  7.5    Style/Cuff Inflation:  Cuffed (inflated to minimal occlusive pressure)    Inflation Amount (mL):  8    Tube secured:  22    Secured at:  The lips    Placement Verified By:  Capnometry    Complicating Factors:  None    Findings Post-Intubation:  BS equal bilateral and atraumatic/condition of teeth unchanged

## 2025-02-10 NOTE — ANESTHESIA PROCEDURE NOTES
Intubation    Date/Time: 2/10/2025 7:25 AM    Performed by: Edmar Neal MD  Authorized by: Shaylee Pantoja MD    Intubation:     Induction:  Intravenous    Intubated:  Postinduction    Mask Ventilation:  Easy mask    Attempts:  1    Attempted By:  Resident anesthesiologist    Method of Intubation:  Video laryngoscopy    Blade:  Stack 3    Laryngeal View Grade: Grade I - full view of cords      Difficult Airway Encountered?: No      Complications:  None    Airway Device:  Oral endotracheal tube    Airway Device Size:  7.5    Style/Cuff Inflation:  Cuffed (inflated to minimal occlusive pressure)    Tube secured:  24    Secured at:  The lips    Placement Verified By:  Capnometry    Complicating Factors:  None    Findings Post-Intubation:  BS equal bilateral and atraumatic/condition of teeth unchanged

## 2025-02-10 NOTE — PLAN OF CARE
Discharge instructions given and explained to patient and family with verbalized understanding. VSS. Denies N/V, tolerating PO fluids and snacks. Voiding spontaneously. Rates pain level as tolerable. IV removed. Pt left floor via W/C, stable for transport, responsible person available for transport home.

## 2025-02-10 NOTE — NURSING TRANSFER
Nursing Transfer Note      2/10/2025   10:40 AM    Reason patient is being transferred: Recovery criteria met    PACU nurse giving handoff: ARACELIS Shabazz    Nurse receiving handoff: ARACELIS Soto    Transfer to [facility]    Transfer via stretcher    Transported by RN    Transfer Vital Signs @ 1030  Temperature: 98  Blood Pressure: 124/78  Heart Rate: 92  O2 Sat: 97% on RA  Respirations: 17    Medicines/Equipment sent with patient: None    Any special needs or follow-up needed: Bedrest until 1230    Patient belongings transferred with patient: Yes one clear belongings bag    Chart send with patient: Yes    Notified: Daughter, Carrie    Patient reassessed prior to transfer at: 2/10/25 @ 1030

## 2025-02-10 NOTE — DISCHARGE SUMMARY
Radiology Discharge Summary      Hospital Course: No complications    Admit Date: 2/10/2025  Discharge Date: 02/10/2025     Instructions Given to Patient: Yes  Diet: Resume prior diet  Activity: activity as tolerated and no driving for today    Description of Condition on Discharge: Stable  Vital Signs (Most Recent): Temp: 99.3 °F (37.4 °C) (02/10/25 0600)  Pulse: 105 (02/10/25 0600)  Resp: 16 (02/10/25 0600)  BP: 101/64 (02/10/25 0612)  SpO2: 100 % (02/10/25 0600)    Discharge Disposition: Home    Discharge Diagnosis: mCRC s/p MWA    Follow up: As scheduled    Raul Vidal M.D.  Interventional Radiology  Department of Radiology

## 2025-02-10 NOTE — DISCHARGE INSTRUCTIONS
ABLATION DISCHARGE EDUCATION   Information:   An ablation is a procedure in which a needle is placed through the skin into a mass within the body, and either heat, cold, or alcohol is used to kill tumor or nerve tissue.    What should I expect after the ablation?      There may be some soreness around the biopsy site.    You may return to work after 24 hours unless your primary doctor instructs you otherwise.    You do not have any diet restrictions because of this procedure but should continue any that were given to you by any other doctors.    Continue all previously prescribed medications with the exception of Coumadin/warfarin which should be resumed after 24 hours. Pradaxa, Xarelto, Eliquis or Savaysa can be resumed after 48 hours.   You may be prescribed an antibiotic to take for at least 5 days post op. You will also receive a prescription for a pain medication and potentially antinausea medication. Please take as prescribed    Bathing & Wound Care:    You may shower after 24 hours; do not tub bath or submerge in water for 3 days (bath tub, hot tub, swimming pool, river or any other body of water).    Dressing to stay on 2-3 days (clean and dry)    If the biopsy site(s) become red, tender, swollen, or starts to drain, contact us.    Avoid heavy lifting and strenuous activity for 3 days.     Follow-up visit information:   Repeat imaging will be performed in about a month, and you will have a  clinic visit with either the Interventional radiology department or at your ordering providers office. If you are not contacted within a month to set up follow up imaging or a clinic visit, please call the Interventional radiology office to set up an appointment to review the results.     Emergencies  Occasionally, a situation will require prompt attention and an emergency room visit is necessary:    Sudden chest pain, shortness of breath, or fainting    Increasing redness, swelling or drainage from the access site     Increasing pain not relieved by medication    Bleeding or drainage from the needle site that is saturating the dressing    You have shaking, chills and/or a temperature over 100.3°F    New, sudden difficulty breathing    Drop in blood pressure, and/or light-headed feeling    Interventional Radiology Clinic    [phone], and choose option 3. Monday - Friday, 8:00 am - 4:00 pm    [phone] After hours and on holidays. Ask to speak with the interventional radiologist on call.

## 2025-02-10 NOTE — PLAN OF CARE
Patient ambulated on unit this morning with his daughter at his side.  Denies pain or SOB.  Verbalized an understanding of the procedure.  Oriented to unit and call bell provided.  Will continue to monitor.

## 2025-02-10 NOTE — PROCEDURES
Radiology Post-Procedure Note    Pre Op Diagnosis: mCRC    Post Op Diagnosis: Same    Procedure: MWA    Procedure performed by: Raul Vidal MD    Written Informed Consent Obtained: Yes  Specimen Removed: NO  Estimated Blood Loss: Minimal    Findings:   Under US and CT guidance, 17 gauge NC XT probes were advanced to two liver lesions and ablation carried out at 65W for 10 minutes. Tract ablated.    Dominant lesion in left lobe grew and ablation won't be able to complete result.    No complications. See dictation.    Patient tolerated procedure well.    Raul Vidal M.D.  Interventional Radiology  Department of Radiology

## 2025-02-10 NOTE — PLAN OF CARE
Pt arrived to IR CT room 173 for MWA with anesthesia. Pt oriented to unit and staff, Pt safely transferred from stretcher to procedural table. Fall risk reviewed and comfort measures utilized with interventions. Safety strap applied, position pillows to minimize pressure points. Blankets applied. Pt prepped and draped utilizing standard sterile technique. Patient placed on continuous monitoring, as required by sedation policy. Timeouts implemented utilizing standard universal time-out per department and facility policy. CRNA to remain at bedside with continuous monitoring. Pt resting comfortably. Denies pain/discomfort. Will continue to monitor. See flow sheets for monitoring, medication administration, and updates. patient verbalizes understanding.

## 2025-02-10 NOTE — ANESTHESIA PREPROCEDURE EVALUATION
02/10/2025  Javier Downs is a 74 y.o., male with hx of colon ca with mets to liver. Here for IR RF ablation of liver mets          Patient Active Problem List   Diagnosis    Small bowel obstruction, partial    Colonic mass    Liver masses    Hypertension    Microcytic anemia    Thrombocytosis    Mesenteric vein thrombosis    Metastatic colon cancer to liver    MARIA A (acute kidney injury)    Hyperkalemia    Encounter for palliative care    Hypovolemia    Anemia, chronic disease    Pleural effusion    SIRS (systemic inflammatory response syndrome)    Hypotension       Review of patient's allergies indicates:  No Known Allergies    Current Outpatient Medications on File Prior to Encounter   Medication Sig Dispense Refill    amLODIPine (NORVASC) 10 MG tablet Take 1 tablet (10 mg total) by mouth once daily. 90 tablet 3    LIDOcaine-prilocaine (EMLA) cream Apply topically as needed (port application). 30 g 3    ondansetron (ZOFRAN) 4 MG tablet Take 1 tablet (4 mg total) by mouth every 8 (eight) hours as needed for Nausea. 30 tablet 3    oxyCODONE (ROXICODONE) 5 MG immediate release tablet Take 1 tablet (5 mg total) by mouth every 6 (six) hours as needed for Pain. 20 tablet 0    pantoprazole (PROTONIX) 40 MG tablet Take 1 tablet (40 mg total) by mouth 2 (two) times daily before meals. 180 tablet 3    tamsulosin (FLOMAX) 0.4 mg Cap Take 1 capsule (0.4 mg total) by mouth once daily. 30 capsule 5    traMADoL (ULTRAM) 50 mg tablet Take 1 tablet (50 mg total) by mouth every 6 (six) hours as needed for Pain. 30 tablet 0    acetaminophen (TYLENOL) 500 MG tablet Take 1 tablet (500 mg total) by mouth every 6 (six) hours as needed for Pain (alternate with ibuprofen).  0     No current facility-administered medications on file prior to encounter.       Past Surgical History:   Procedure Laterality Date    COLONOSCOPY N/A  2021    Procedure: COLONOSCOPY with possible stent;  Surgeon: EDILMA Bonilla MD;  Location: [facility] ENDO (2ND FLR);  Service: Colon and Rectal;  Laterality: N/A;    COLONOSCOPY N/A 11/3/2023    Procedure: COLONOSCOPY;  Surgeon: EDILMA Bonilla MD;  Location: [facility] ENDO (4TH FLR);  Service: Endoscopy;  Laterality: N/A;  prep instructions sent to pt via portal  From: EDILMA Bonilla MD  Procedure: Colonoscopy  Diagnosis: Surveillance colonoscopy - Hx of colon cancer  Procedure Timin-12 weeks  Provider: Myself  Location: [facility] 4-Endo  Additional Scheduling Information: No scheduling con    COLONOSCOPY N/A 7/10/2024    Procedure: COLONOSCOPY;  Surgeon: Tam Pantoja MD;  Location: [facility] ENDO (2ND FLR);  Service: Endoscopy;  Laterality: N/A;    DIAGNOSTIC LAPAROSCOPY N/A 2022    Procedure: LAPAROSCOPY, DIAGNOSTIC;  Surgeon: Adrian Rodriguez MD;  Location: [facility] OR 2ND FLR;  Service: General;  Laterality: N/A;    INSERTION OF TUNNELED CENTRAL VENOUS CATHETER (CVC) WITH SUBCUTANEOUS PORT N/A 5/3/2021    Procedure: [id number];  Surgeon: Francisco Layton MD;  Location: [facility] OR 2ND FLR;  Service: Vascular;  Laterality: N/A;    OMENTECTOMY N/A 10/20/2022    Procedure: OMENTECTOMY;  Surgeon: EDILMA Bonilla MD;  Location: [facility] OR 2ND FLR;  Service: Colon and Rectal;  Laterality: N/A;    RIGHT HEMICOLECTOMY N/A 10/20/2022    Procedure: HEMICOLECTOMY, RIGHT, extended;  Surgeon: EDILMA Bonilla MD;  Location: [facility] OR 2ND FLR;  Service: Colon and Rectal;  Laterality: N/A;  Extended right hemicolectomy CONSENT IN AM       Social History     Socioeconomic History    Marital status:    Tobacco Use    Smoking status: Never    Smokeless tobacco: Never   Substance and Sexual Activity    Alcohol use: Not Currently    Drug use: Never    Sexual activity: Not Currently     Partners: Female     Social Drivers of Health     Financial Resource Strain: Low Risk  (2025)    Overall Financial Resource Strain  (CARDIA)     Difficulty of Paying Living Expenses: Not hard at all   Food Insecurity: No Food Insecurity (1/20/2025)    Hunger Vital Sign     Worried About Running Out of Food in the Last Year: Never true     Ran Out of Food in the Last Year: Never true   Transportation Needs: No Transportation Needs (12/20/2024)    TRANSPORTATION NEEDS     Transportation : No   Physical Activity: Insufficiently Active (1/20/2025)    Exercise Vital Sign     Days of Exercise per Week: 2 days     Minutes of Exercise per Session: 20 min   Stress: No Stress Concern Present (1/20/2025)    [employer] - Occupational Stress Questionnaire     Feeling of Stress : Not at all   Housing Stability: Low Risk  (1/20/2025)    Housing Stability Vital Sign     Unable to Pay for Housing in the Last Year: No     Homeless in the Last Year: No         CBC:   Recent Labs     02/07/25  1338   WBC 6.54   RBC 3.50*   HGB 9.4*   HCT 31.8*      MCV 91   MCH 26.9*   MCHC 29.6*       INR  Recent Labs     02/07/25  1338   INR 1.1           Pre-op Assessment    I have reviewed the Patient Summary Reports.    I have reviewed the NPO Status.   I have reviewed the Medications.     Review of Systems  Anesthesia Hx:  No problems with previous Anesthesia   History of prior surgery of interest to airway management or planning:            Denies Personal Hx of Anesthesia complications.                    Social:  Non-Smoker       Cardiovascular:     Hypertension                                          Pulmonary:  Pulmonary Normal                       Hepatic/GI:      Liver Disease,               Neurological:  Neurology Normal                                      Endocrine:  Endocrine Normal                Physical Exam  General: Well nourished, Cooperative, Alert and Oriented    Airway:  Mallampati: / I  Mouth Opening: Normal  TM Distance: Normal  Tongue: Normal  Neck ROM: Normal ROM    Dental:  Intact        Anesthesia Plan  Type of  Anesthesia, risks & benefits discussed:    Anesthesia Type: Gen ETT  Intra-op Monitoring Plan: Standard ASA Monitors  Post Op Pain Control Plan: multimodal analgesia  Induction:  IV  Airway Plan: Direct, Post-Induction  Informed Consent: Informed consent signed with the Patient and all parties understand the risks and agree with anesthesia plan.  All questions answered.   ASA Score: 3    Ready For Surgery From Anesthesia Perspective.     .

## 2025-02-10 NOTE — PLAN OF CARE
Procedure completed. Patient tolerated well; vitals per anesthesia. Site CDI. Patient to be transported to PACU for 4 hour recovery; report to be given at the bedside.

## 2025-02-11 VITALS
RESPIRATION RATE: 16 BRPM | HEIGHT: 67 IN | HEART RATE: 95 BPM | DIASTOLIC BLOOD PRESSURE: 68 MMHG | OXYGEN SATURATION: 99 % | SYSTOLIC BLOOD PRESSURE: 106 MMHG | WEIGHT: 120 LBS | TEMPERATURE: 98 F | BODY MASS INDEX: 18.83 KG/M2

## 2025-02-11 DIAGNOSIS — R16.0 LIVER MASSES: Primary | ICD-10-CM

## 2025-02-13 NOTE — ANESTHESIA POSTPROCEDURE EVALUATION
Anesthesia Post Evaluation    Patient: Javier Downs    Procedure(s) Performed: * No procedures listed *    Final Anesthesia Type: general      Patient location during evaluation: PACU  Patient participation: Yes- Able to Participate  Level of consciousness: awake and alert  Post-procedure vital signs: reviewed and stable  Pain management: adequate  Airway patency: patent    PONV status at discharge: No PONV  Anesthetic complications: no      Cardiovascular status: stable  Respiratory status: unassisted and spontaneous ventilation  Hydration status: euvolemic  Follow-up not needed.              Vitals Value Taken Time   /68 02/10/25 1232   Temp 36.6 °C (97.9 °F) 02/10/25 1240   Pulse 95 02/10/25 1240   Resp 16 02/10/25 1040   SpO2 99 % 02/10/25 1240         Event Time   Out of Recovery 10:38:00         Pain/Jill Score: No data recorded

## 2025-02-14 ENCOUNTER — TELEPHONE (OUTPATIENT)
Dept: INTERVENTIONAL RADIOLOGY/VASCULAR | Facility: CLINIC | Age: 75
End: 2025-02-14
Payer: MEDICARE

## 2025-02-18 ENCOUNTER — LAB VISIT (OUTPATIENT)
Dept: LAB | Facility: HOSPITAL | Age: 75
End: 2025-02-18
Payer: MEDICARE

## 2025-02-18 ENCOUNTER — OFFICE VISIT (OUTPATIENT)
Dept: HEMATOLOGY/ONCOLOGY | Facility: CLINIC | Age: 75
End: 2025-02-18
Payer: MEDICARE

## 2025-02-18 VITALS
HEIGHT: 67 IN | BODY MASS INDEX: 18.05 KG/M2 | TEMPERATURE: 98 F | OXYGEN SATURATION: 100 % | HEART RATE: 94 BPM | SYSTOLIC BLOOD PRESSURE: 109 MMHG | RESPIRATION RATE: 18 BRPM | WEIGHT: 115 LBS | DIASTOLIC BLOOD PRESSURE: 70 MMHG

## 2025-02-18 DIAGNOSIS — I95.9 HYPOTENSION, UNSPECIFIED HYPOTENSION TYPE: ICD-10-CM

## 2025-02-18 DIAGNOSIS — R39.9 LOWER URINARY TRACT SYMPTOMS (LUTS): ICD-10-CM

## 2025-02-18 DIAGNOSIS — R63.4 WEIGHT DECREASE: ICD-10-CM

## 2025-02-18 DIAGNOSIS — D70.1 CHEMOTHERAPY INDUCED NEUTROPENIA: ICD-10-CM

## 2025-02-18 DIAGNOSIS — K92.2 LOWER GI BLEED: ICD-10-CM

## 2025-02-18 DIAGNOSIS — C78.7 METASTATIC COLON CANCER TO LIVER: ICD-10-CM

## 2025-02-18 DIAGNOSIS — C78.7 METASTATIC COLON CANCER TO LIVER: Primary | ICD-10-CM

## 2025-02-18 DIAGNOSIS — C18.9 METASTATIC COLON CANCER TO LIVER: Primary | ICD-10-CM

## 2025-02-18 DIAGNOSIS — D84.81 IMMUNODEFICIENCY SECONDARY TO NEOPLASM: ICD-10-CM

## 2025-02-18 DIAGNOSIS — T45.1X5A ANEMIA ASSOCIATED WITH CHEMOTHERAPY: ICD-10-CM

## 2025-02-18 DIAGNOSIS — T45.1X5A CHEMOTHERAPY INDUCED NEUTROPENIA: ICD-10-CM

## 2025-02-18 DIAGNOSIS — K59.03 DRUG-INDUCED CONSTIPATION: ICD-10-CM

## 2025-02-18 DIAGNOSIS — C18.9 METASTATIC COLON CANCER TO LIVER: ICD-10-CM

## 2025-02-18 DIAGNOSIS — D84.821 IMMUNODEFICIENCY DUE TO CHEMOTHERAPY: ICD-10-CM

## 2025-02-18 DIAGNOSIS — T45.1X5A IMMUNODEFICIENCY DUE TO CHEMOTHERAPY: ICD-10-CM

## 2025-02-18 DIAGNOSIS — E43 SEVERE MALNUTRITION: ICD-10-CM

## 2025-02-18 DIAGNOSIS — Z79.899 IMMUNODEFICIENCY DUE TO CHEMOTHERAPY: ICD-10-CM

## 2025-02-18 DIAGNOSIS — D49.9 IMMUNODEFICIENCY SECONDARY TO NEOPLASM: ICD-10-CM

## 2025-02-18 DIAGNOSIS — E86.0 DEHYDRATION: ICD-10-CM

## 2025-02-18 DIAGNOSIS — G89.3 NEOPLASM RELATED PAIN: ICD-10-CM

## 2025-02-18 DIAGNOSIS — D64.81 ANEMIA ASSOCIATED WITH CHEMOTHERAPY: ICD-10-CM

## 2025-02-18 LAB
ALBUMIN SERPL BCP-MCNC: 2.5 G/DL (ref 3.5–5.2)
ALP SERPL-CCNC: 266 U/L (ref 40–150)
ALT SERPL W/O P-5'-P-CCNC: 25 U/L (ref 10–44)
ANION GAP SERPL CALC-SCNC: 8 MMOL/L (ref 8–16)
AST SERPL-CCNC: 29 U/L (ref 10–40)
BILIRUB SERPL-MCNC: 0.9 MG/DL (ref 0.1–1)
BUN SERPL-MCNC: 5 MG/DL (ref 8–23)
CALCIUM SERPL-MCNC: 8.9 MG/DL (ref 8.7–10.5)
CEA SERPL-MCNC: 57.6 NG/ML (ref 0–5)
CHLORIDE SERPL-SCNC: 107 MMOL/L (ref 95–110)
CO2 SERPL-SCNC: 25 MMOL/L (ref 23–29)
CREAT SERPL-MCNC: 0.6 MG/DL (ref 0.5–1.4)
ERYTHROCYTE [DISTWIDTH] IN BLOOD BY AUTOMATED COUNT: 20.9 % (ref 11.5–14.5)
EST. GFR  (NO RACE VARIABLE): >60 ML/MIN/1.73 M^2
GLUCOSE SERPL-MCNC: 99 MG/DL (ref 70–110)
HCT VFR BLD AUTO: 31.3 % (ref 40–54)
HGB BLD-MCNC: 9.2 G/DL (ref 14–18)
IMM GRANULOCYTES # BLD AUTO: 0.19 K/UL (ref 0–0.04)
MCH RBC QN AUTO: 27.8 PG (ref 27–31)
MCHC RBC AUTO-ENTMCNC: 29.4 G/DL (ref 32–36)
MCV RBC AUTO: 95 FL (ref 82–98)
NEUTROPHILS # BLD AUTO: 13.6 K/UL (ref 1.8–7.7)
PLATELET # BLD AUTO: 198 K/UL (ref 150–450)
PMV BLD AUTO: 10.2 FL (ref 9.2–12.9)
POTASSIUM SERPL-SCNC: 3.7 MMOL/L (ref 3.5–5.1)
PROT SERPL-MCNC: 6.3 G/DL (ref 6–8.4)
RBC # BLD AUTO: 3.31 M/UL (ref 4.6–6.2)
SODIUM SERPL-SCNC: 140 MMOL/L (ref 136–145)
WBC # BLD AUTO: 17.16 K/UL (ref 3.9–12.7)

## 2025-02-18 PROCEDURE — 82378 CARCINOEMBRYONIC ANTIGEN: CPT | Mod: HCNC | Performed by: REGISTERED NURSE

## 2025-02-18 PROCEDURE — 36415 COLL VENOUS BLD VENIPUNCTURE: CPT | Mod: HCNC | Performed by: REGISTERED NURSE

## 2025-02-18 PROCEDURE — 99999 PR PBB SHADOW E&M-EST. PATIENT-LVL III: CPT | Mod: PBBFAC,HCNC,, | Performed by: REGISTERED NURSE

## 2025-02-18 PROCEDURE — 85027 COMPLETE CBC AUTOMATED: CPT | Mod: HCNC | Performed by: REGISTERED NURSE

## 2025-02-18 PROCEDURE — 80053 COMPREHEN METABOLIC PANEL: CPT | Mod: HCNC | Performed by: REGISTERED NURSE

## 2025-02-18 RX ORDER — SODIUM CHLORIDE 0.9 % (FLUSH) 0.9 %
10 SYRINGE (ML) INJECTION
Status: CANCELLED | OUTPATIENT
Start: 2025-02-21

## 2025-02-18 RX ORDER — SODIUM CHLORIDE 0.9 % (FLUSH) 0.9 %
10 SYRINGE (ML) INJECTION
Status: CANCELLED | OUTPATIENT
Start: 2025-02-18

## 2025-02-18 RX ORDER — PROCHLORPERAZINE EDISYLATE 5 MG/ML
5 INJECTION INTRAMUSCULAR; INTRAVENOUS ONCE AS NEEDED
Status: CANCELLED
Start: 2025-02-18

## 2025-02-18 RX ORDER — ATROPINE SULFATE 0.4 MG/ML
0.4 INJECTION, SOLUTION ENDOTRACHEAL; INTRAMEDULLARY; INTRAMUSCULAR; INTRAVENOUS; SUBCUTANEOUS ONCE AS NEEDED
Status: CANCELLED | OUTPATIENT
Start: 2025-02-18

## 2025-02-18 RX ORDER — DIPHENHYDRAMINE HYDROCHLORIDE 50 MG/ML
50 INJECTION INTRAMUSCULAR; INTRAVENOUS ONCE AS NEEDED
Status: CANCELLED | OUTPATIENT
Start: 2025-02-18

## 2025-02-18 RX ORDER — HEPARIN 100 UNIT/ML
500 SYRINGE INTRAVENOUS
Status: CANCELLED | OUTPATIENT
Start: 2025-02-18

## 2025-02-18 RX ORDER — EPINEPHRINE 0.3 MG/.3ML
0.3 INJECTION SUBCUTANEOUS ONCE AS NEEDED
Status: CANCELLED | OUTPATIENT
Start: 2025-02-18

## 2025-02-18 RX ORDER — HEPARIN 100 UNIT/ML
500 SYRINGE INTRAVENOUS
Status: CANCELLED | OUTPATIENT
Start: 2025-02-21

## 2025-02-18 NOTE — PROGRESS NOTES
[facility]  [facility]  Hematology/Medical Oncology Clinic     PATIENT: Javier Downs  MRN: [id number]  DATE: 2/18/2025    Diagnosis: Transverse colon metastatic cancer to the liver     Oncological history:   04/20/2021: metastatic colon cancer to the liver, moderately differentiated, pMMR  05/06/2021: C1 mFOLFOX + Pema  05/20/2021: C2 mFOLFOX + Pema  06/03/2021: C3 mFOLFOX + Pema   06/17/2021: C4 mFOLFOX + Pema  07/01/2021: C5 mFOLFOX + Pema  07/15/2021: C6 mFOLFOX + Pema  Restaging CT CAP: significant improvement of lesions   07/29/2021: C7 mFOLFOX + Pema   08/12/2021: Switched to maintenance  5FU+Pema (C8)  06/23/2022: C30 maintenance 5FU+Pema  10/20/2022: R hemicolectomy with Dr. Bonilla  12/30/2022: Y-90 to R liver  1/27/2023: Y-90 to R liver  5/17/2023: Y-90 to R liver  7/11/2023: C1 FOLFIRI + Pema  7/26/2023: C2 FOLFIRI + Pema  8/8/2023: C3 FOLFIRI + Pema  8/22/23: C4 FOLFIRI + Pema  Restaging CT CAP 9/1/23: Good response to chemo.  9/7/23: C5 FOLFIRI + Pema  ......................................  Restaging CT CAP 10/12/23: Good response to chemo  10/16/23: C8 FOLFIRI + Pema  .......................................  Restaging CT CAP 12/8/23: Good response to chemo  12/11/23: C12 FOLFIRI + Pema  .......................................  Restaging CT CAP 2/5/24: Good response to chemo  2/8/24: C16 FOLFIRI + Pema    Restaging CT CAP 4/26/24: Good response to chemo  Restaging CT CAP 6/6/24: Good response to chemo  Restaging CT CAP 8/1/24: Stable disease  Restaging CT CAP 10/11/24: Progression of R liver lobe mass.  Plan for Y-90 to this.  Restaging CT CAP 12/20/24: No new extrahepatic disease.  Plan for MWA to left lobe lesion.      Interval History:   He presents today with his daughter prior to cycle 39 of FOLFIRI (off Avastin due to GIB). Doing well overall. Underwent MWA on 2/10/25 and tolerated without significant toxicities. Had some intermittent abdominal pain, improved now. Eating well and  staying hydrated. No nausea, bowel changes or overt bleeding. Breathing is good and not feeling as winded recently. No other concerns.     ECOG status is 1. Presents with his daughter today.    Past Medical History:   Past Medical History:   Diagnosis Date    MARIA A (acute kidney injury) 2022    Hypertension     Hypotension 2025    Metastatic colon cancer to liver 2021     Past Surgical HIstory:   Past Surgical History:   Procedure Laterality Date    COLONOSCOPY N/A 2021    Procedure: COLONOSCOPY with possible stent;  Surgeon: EDILMA Bonilla MD;  Location: [facility] ENDO (2ND FLR);  Service: Colon and Rectal;  Laterality: N/A;    COLONOSCOPY N/A 11/3/2023    Procedure: COLONOSCOPY;  Surgeon: EDILMA Bonilla MD;  [facility]: [facility] ([facility]);  Service: Endoscopy;  Laterality: N/A;  prep instructions sent to pt via portal  From: EDILMA Bonilla MD  Procedure: Colonoscopy  Diagnosis: Surveillance colonoscopy - Hx of colon cancer  Procedure Timin-12 weeks  Provider: Myself  [facility]  Additional Scheduling Information: No scheduling con    COLONOSCOPY N/A 7/10/2024    Procedure: COLONOSCOPY;  Surgeon: Tam Pantoja MD;  Location: [facility] ENDO (2ND FLR);  Service: Endoscopy;  Laterality: N/A;    DIAGNOSTIC LAPAROSCOPY N/A 2022    Procedure: LAPAROSCOPY, DIAGNOSTIC;  Surgeon: Adrian Rodriguez MD;  [facility];  Service: General;  Laterality: N/A;    INSERTION OF TUNNELED CENTRAL VENOUS CATHETER (CVC) WITH SUBCUTANEOUS PORT N/A 5/3/2021    Procedure: [id number]-[id number];  Surgeon: Francisco Layton MD;  [facility];  Service: Vascular;  Laterality: N/A;    OMENTECTOMY N/A 10/20/2022    Procedure: OMENTECTOMY;  Surgeon: EDILMA Bonilla MD;  [facility];  Service: Colon and Rectal;  Laterality: N/A;    RIGHT HEMICOLECTOMY N/A 10/20/2022    Procedure: HEMICOLECTOMY, RIGHT, extended;  Surgeon: EDILMA Bonilla MD;  Location: [facility] OR [address] [place] [address];   Service: Colon and Rectal;  Laterality: N/A;  Extended right hemicolectomy CONSENT IN AM     Family History:   Family History   Problem Relation Name Age of Onset    Cancer Brother         Social History:  reports that he has never smoked. He has never used smokeless tobacco. He reports that he does not currently use alcohol. He reports that he does not use drugs.    Allergies:  Review of patient's allergies indicates:  No Known Allergies    Medications:  Current Outpatient Medications   Medication Sig Dispense Refill    acetaminophen (TYLENOL) 500 MG tablet Take 1 tablet (500 mg total) by mouth every 6 (six) hours as needed for Pain (alternate with ibuprofen).  0    amLODIPine (NORVASC) 10 MG tablet Take 1 tablet (10 mg total) by mouth once daily. 90 tablet 3    amoxicillin-clavulanate 875-125mg (AUGMENTIN) 875-125 mg per tablet Take 1 tablet by mouth 2 (two) times daily. 14 tablet 0    LIDOcaine-prilocaine (EMLA) cream Apply topically as needed (port application). 30 g 3    ondansetron (ZOFRAN) 4 MG tablet Take 1 tablet (4 mg total) by mouth every 8 (eight) hours as needed for Nausea. 30 tablet 3    ondansetron (ZOFRAN) 4 MG tablet Take 1 tablet (4 mg total) by mouth every 8 (eight) hours as needed for Nausea. 30 tablet 0    OPW TEST CLAIM - DO NOT FILL OPW test claim. Do not fill. 1 tablet 0    oxyCODONE (ROXICODONE) 5 MG immediate release tablet Take 1 tablet (5 mg total) by mouth every 6 (six) hours as needed for Pain. 20 tablet 0    oxyCODONE-acetaminophen (PERCOCET) 5-325 mg per tablet Take 1 tablet by mouth every 4 (four) hours as needed for Pain. 25 tablet 0    pantoprazole (PROTONIX) 40 MG tablet Take 1 tablet (40 mg total) by mouth 2 (two) times daily before meals. 180 tablet 3    tamsulosin (FLOMAX) 0.4 mg Cap Take 1 capsule (0.4 mg total) by mouth once daily. 30 capsule 5    traMADoL (ULTRAM) 50 mg tablet Take 1 tablet (50 mg total) by mouth every 6 (six) hours as needed for Pain. 30 tablet 0     No  "current facility-administered medications for this visit.     Review of Systems   Constitutional:  Positive for fatigue. Negative for activity change, appetite change, chills, diaphoresis, fever and unexpected weight change.   HENT:  Negative for voice change.    Eyes:  Negative for visual disturbance.   Respiratory:  Positive for shortness of breath (improving). Negative for cough.    Cardiovascular:  Negative for chest pain and leg swelling.   Gastrointestinal:  Positive for abdominal distention. Negative for abdominal pain, blood in stool, constipation, diarrhea, nausea and vomiting.   Musculoskeletal:  Negative for arthralgias and back pain.   Skin:  Negative for wound.   Neurological:  Negative for dizziness, weakness and numbness.   Psychiatric/Behavioral:  Negative for confusion and sleep disturbance. The patient is not nervous/anxious.    All other systems reviewed and are negative.    ECOG Performance Status: 1     Objective:      Vitals:   Vitals:    02/18/25 1333   BP: 109/70   BP Location: Left arm   Patient Position: Sitting   Pulse: 94   Resp: 18   Temp: 98 °F (36.7 °C)   TempSrc: Temporal   SpO2: 100%   Weight: 52.2 kg (115 lb)   Height: 5' 7" (1.702 m)       Physical Exam  Vitals reviewed.   Constitutional:       General: He is not in acute distress.     Comments: Chronically ill appearing   HENT:      Head: Normocephalic and atraumatic.      Right Ear: External ear normal.      Left Ear: External ear normal.      Nose: Nose normal.      Mouth/Throat:      Pharynx: Oropharynx is clear.   Eyes:      General: No scleral icterus.     Extraocular Movements: Extraocular movements intact.      Conjunctiva/sclera: Conjunctivae normal.   Cardiovascular:      Rate and Rhythm: Normal rate and regular rhythm.      Pulses: Normal pulses.      Heart sounds: Normal heart sounds. No murmur heard.  Pulmonary:      Effort: Pulmonary effort is normal. No respiratory distress.      Breath sounds: Normal breath sounds. " No wheezing.   Chest:      Comments: Port to RCW, no signs of infection.  Abdominal:      General: Abdomen is flat. Bowel sounds are normal. There is distension (mild).      Palpations: Abdomen is soft. There is no mass.      Tenderness: There is no abdominal tenderness.      Comments: Vertical midline abdominal wound well healed   Musculoskeletal:         General: No swelling or deformity. Normal range of motion.      Right lower leg: No edema.      Left lower leg: No edema.   Skin:     Coloration: Skin is not jaundiced or pale.      Findings: No bruising, erythema or rash.   Neurological:      General: No focal deficit present.      Mental Status: He is alert and oriented to person, place, and time. Mental status is at baseline.      Cranial Nerves: No cranial nerve deficit.      Sensory: No sensory deficit.      Gait: Gait normal.   Psychiatric:         Mood and Affect: Mood normal.         Behavior: Behavior normal.         Thought Content: Thought content normal.         Judgment: Judgment normal.     Laboratory Data:  Lab Visit on 02/18/2025   Component Date Value Ref Range Status    CEA 02/18/2025 57.6 (H)  0.0 - 5.0 ng/mL Final    Comment: CEA Normal Range:  Non-Smokers: 0-3.0 ng/mL  Smokers:     0-5.0 ng/mL    The testing method is a chemiluminescent microparticle immunoassay   manufactured by [employer] and performed on the [employer]   or   [employer] system. Values obtained with different assay manufacturers   for   methods may be different and cannot be used interchangeably.      Sodium 02/18/2025 140  136 - 145 mmol/L Final    Potassium 02/18/2025 3.7  3.5 - 5.1 mmol/L Final    Chloride 02/18/2025 107  95 - 110 mmol/L Final    CO2 02/18/2025 25  23 - 29 mmol/L Final    Glucose 02/18/2025 99  70 - 110 mg/dL Final    BUN 02/18/2025 5 (L)  8 - 23 mg/dL Final    Creatinine 02/18/2025 0.6  0.5 - 1.4 mg/dL Final    Calcium 02/18/2025 8.9  8.7 - 10.5 mg/dL Final    Total Protein 02/18/2025 6.3  6.0 -  8.4 g/dL Final    Albumin 02/18/2025 2.5 (L)  3.5 - 5.2 g/dL Final    Total Bilirubin 02/18/2025 0.9  0.1 - 1.0 mg/dL Final    Comment: For infants and newborns, interpretation of results should be based  on gestational age, weight and in agreement with clinical  observations.    Premature Infant recommended reference ranges:  Up to 24 hours.............<8.0 mg/dL  Up to 48 hours............<12.0 mg/dL  3-5 days..................<15.0 mg/dL  6-29 days.................<15.0 mg/dL      Alkaline Phosphatase 02/18/2025 266 (H)  40 - 150 U/L Final    AST 02/18/2025 29  10 - 40 U/L Final    ALT 02/18/2025 25  10 - 44 U/L Final    eGFR 02/18/2025 >60.0  >60 mL/min/1.73 m^2 Final    Anion Gap 02/18/2025 8  8 - 16 mmol/L Final    WBC 02/18/2025 17.16 (H)  3.90 - 12.70 K/uL Final    RBC 02/18/2025 3.31 (L)  4.60 - 6.20 M/uL Final    Hemoglobin 02/18/2025 9.2 (L)  14.0 - 18.0 g/dL Final    Hematocrit 02/18/2025 31.3 (L)  40.0 - 54.0 % Final    MCV 02/18/2025 95  82 - 98 fL Final    MCH 02/18/2025 27.8  27.0 - 31.0 pg Final    MCHC 02/18/2025 29.4 (L)  32.0 - 36.0 g/dL Final    RDW 02/18/2025 20.9 (H)  11.5 - 14.5 % Final    Platelets 02/18/2025 198  150 - 450 K/uL Final    MPV 02/18/2025 10.2  9.2 - 12.9 fL Final    Gran # (ANC) 02/18/2025 13.6 (H)  1.8 - 7.7 K/uL Final    Comment: The ANC is based on a white cell differential from an   automated cell counter. It has not been microscopically   reviewed for the presence of abnormal cells. Clinical   correlation is required.      Immature Grans (Abs) 02/18/2025 0.19 (H)  0.00 - 0.04 K/uL Final    Comment: Mild elevation in immature granulocytes is non specific and   can be seen in a variety of conditions including stress response,   acute inflammation, trauma and pregnancy. Correlation with other   laboratory and clinical findings is essential.       Assessment and Plan        1. Metastatic colon cancer to liver    2. Immunodeficiency secondary to neoplasm    3. Immunodeficiency  due to chemotherapy    4. Chemotherapy induced neutropenia    5. Lower GI bleed    6. Anemia associated with chemotherapy    7. Weight decrease    8. Neoplasm related pain    9. Drug-induced constipation    10. Severe malnutrition    11. Hypotension, unspecified hypotension type    12. Lower urinary tract symptoms (LUTS)    13. Dehydration        1-6.  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  Guardant 360 was negative for BRAF, VIRI, HER2 amplification.   Pretreatment CEA 57.  We had a long and sonia discussion with him about his diagnosis. Unfortunately, the disease is not curable but remains treatable and he has good performance status.   Restaging scans after 7 cycles of FOLFOX + Pema showed significant reduction in colonic mass and liver mass.   we switched to maintenance as of cycle 8 with 5FU + Pema  Restaging scans from March 2022 show stable disease.   MRI on 7/18/22 showing hepatic progression of disease in the right hepatic lobe noted on the exam. CT CAP on 8/26 shows some mild progression in both liver metastases.    Discussed case at colorectal tumor board 8/31/22 and had a diagnostic lap performed by Dr. Rodriguez on 9/7 to assess for any occult peritoneal disease. Findings from the diagnostic lap were negative. Fluid from around from around the primary mass was sent for cytology that was negative for malignancy.   Underwent primary tumor resection on 10/20/22 with Dr. Bonilla.    Meanwhile his liver mets had grown.  We discussed his case at [facility] conference with plans for short term Y-90 and then restarting chemotherapy prior to considering any surgical options to his liver metastases.  He underwent Y-90 delivery on 12/30/22. Tolerated well.   CT CAP on 1/23/23 reviewed at [facility] conference with good response to Y-90, no new lesions.  Underwent second Y-90 on 1/27/23. Can consider R hepatectomy pending follow-up imaging after Y-90.     Received cycle 42 of FOLFOX (omitted  oxaliplatin again starting cycle 41 due to neuropathy) on 2/9/23.  Because of 5-FU shortage nationally, we have been holding chemotherapy since mid-February 2023.  If he needs to restart chemotherapy (I.e. no plans for surgical resection), will place him on capecitabine maintenance for duration of 5-FU shortage.     Discussed at colorectal liver mets tumor board 3/16/23.  Plan for repeat Y-90 and then consideration of surgical resection and he will meet with liver transplant team as well.  Underwent Y-90 delivery 5/17/23 with IR.     Has been on maintenance Xeloda since late April 2023.    Met with liver transplant team but ultimately opted not to proceed with transplant as he was concerned about how he would tolerate a major surgery with the life changes that would come after transplant as well. They closed out his case.    He met with Dr. Rodriguez to discuss whether surgical resection is indicated for his liver mets.  He recommended repeat MRI.  Repeat MRI unfortunately showed disease progression while on Xeloda maintenance.  Similarly his CEA garrett precipitously, all in keeping with worsening disease.    We recommended we restart IV chemotherapy with FOLFIRI + Avastin (never had irinotecan).    Because of hyperbilirubinemia he received cycle 1 with just 5-FU + Avastin on 7/11/23. Tolerated this well. Bilirubin has improved.  Received irinotecan starting with cycle 2.  Repeat CT CAP after cycle 4 shows good response to therapy.   Excellent tolerance. [facility] tumor board agrees with continuation of chemotherapy.   Repeat CT CAP after cycle 7 shows stable disease, no evidence of new or worsening disease.   Repeat CT CAP after cycle 11 shows stable disease.   Repeat CT CAP after cycle 15 shows improved disease.  Repeat CT CAP after cycle 21 shows stable disease.   Repeat CT CAP after cycle 24 shows stable disease.    Hospitalized after cycle 26 and 27 (without Avastin) for hematochezia.  Colonoscopy with likely  diverticular bleed.  Required 2 units of blood in last month.  Will continue to hold bevacizumab indefinitely.  Discussed that we don't have a very good way preventing this recurrence and chemo with its associated thrombocytopenia may make this more likely to occur again.  He still wishes to proceed, which is my recommendation as well.    Repeat CT CAP after cycle 28 shows stable disease and recc to continue with chemotherapy.   Repeat CT CAP after cycle 32 shows progression of R liver lobe metastasis with rising CEA.  Will plan for Y-90 to growing lesion.  Underwent mapping 11/5/24 and delivery 11/21/24.  CT CAP 12/20/24 shows otherwise stable disease.    Underwent L lobe MWA per Dr. Vidal on 2/10/25.     Will continue FOLFIRI in the meantime.    Delayed cycle 33 due to neutropenia - has since recovered and on Neulasta.    - I have reviewed the CBC and CMP, adequate for treatment.  - Proceed with cycle 39 of FOLFIRI on Thursday + OBI Neulasta.    RTC in 2 weeks for next cycle.    Tempus xT: APC, CKS1B cng, ERCC3 cnl, PIK3CA; KENIA, TMB 12.1.  Tempus xF: APC, KRAS Q61H, PIK3CA, TP53; KENIA    7-10. Neoplasm related pain, weight loss, constipation, malnutrition  -- Pain very well controlled. Has oxycodone 5mg to use PRN but not requiring now.  -- Following with nutrition. Encouraged increased protein. Monitor.  Weight down slightly but reports good appetite.  -- Continue Miralax daily for constipation. Doing well with this.     11. Hypotension   -- BP normal today. Asymptomatic.   -- Following with PCP. Holding amlodipine until after procedure.   -- Encouraged him and his daughter to monitor BP and HR closely at home.     12-13. Urinary Hesitancy/dehydration  -- Continue Flomax.   -- Reported improvement since starting medication.     RTC in 2 weeks    Patient is in agreement with the proposed treatment plan. All questions were answered to the patient's satisfaction. Pt knows to call clinic if anything is needed before  the next clinic visit.    Patient discussed with collaborating physician, Dr. Schuster.    At least 40 minutes were spent today on this encounter including face to face time with the patient, data gathering/interpretation and documentation.       Natividad Maher, MSN, APRN, [facility]  Hematology and Medical Oncology  Clinical Nurse Specialist to Dr. Schuster, Dr. Quijano & Dr. Mueller    Route Chart for Scheduling    Med Onc Chart Routing      Follow up with physician 2 weeks and 4 weeks. with labs for chemo.   Follow up with RACHEL    Infusion scheduling note   chemo every 2 weeks, pump d/c on day 3   Injection scheduling note    Labs CBC, CMP and CEA   Scheduling:  Preferred lab:  Lab interval:     Imaging    Pharmacy appointment    Other referrals

## 2025-02-20 ENCOUNTER — INFUSION (OUTPATIENT)
Dept: INFUSION THERAPY | Facility: HOSPITAL | Age: 75
End: 2025-02-20
Payer: MEDICARE

## 2025-02-20 VITALS
DIASTOLIC BLOOD PRESSURE: 70 MMHG | WEIGHT: 114.19 LBS | SYSTOLIC BLOOD PRESSURE: 122 MMHG | HEIGHT: 67 IN | RESPIRATION RATE: 18 BRPM | OXYGEN SATURATION: 100 % | BODY MASS INDEX: 17.92 KG/M2 | HEART RATE: 75 BPM | TEMPERATURE: 98 F

## 2025-02-20 DIAGNOSIS — C18.9 METASTATIC COLON CANCER TO LIVER: Primary | ICD-10-CM

## 2025-02-20 DIAGNOSIS — C78.7 METASTATIC COLON CANCER TO LIVER: Primary | ICD-10-CM

## 2025-02-20 DIAGNOSIS — I95.9 HYPOTENSION, UNSPECIFIED HYPOTENSION TYPE: ICD-10-CM

## 2025-02-20 PROCEDURE — 25000003 PHARM REV CODE 250: Mod: HCNC | Performed by: REGISTERED NURSE

## 2025-02-20 PROCEDURE — 96367 TX/PROPH/DG ADDL SEQ IV INF: CPT | Mod: HCNC

## 2025-02-20 PROCEDURE — 63600175 PHARM REV CODE 636 W HCPCS: Mod: HCNC | Performed by: REGISTERED NURSE

## 2025-02-20 PROCEDURE — 96416 CHEMO PROLONG INFUSE W/PUMP: CPT | Mod: HCNC

## 2025-02-20 PROCEDURE — 96413 CHEMO IV INFUSION 1 HR: CPT | Mod: HCNC

## 2025-02-20 PROCEDURE — 96375 TX/PRO/DX INJ NEW DRUG ADDON: CPT | Mod: HCNC

## 2025-02-20 RX ORDER — DIPHENHYDRAMINE HYDROCHLORIDE 50 MG/ML
50 INJECTION INTRAMUSCULAR; INTRAVENOUS ONCE AS NEEDED
Status: DISCONTINUED | OUTPATIENT
Start: 2025-02-20 | End: 2025-02-20 | Stop reason: HOSPADM

## 2025-02-20 RX ORDER — EPINEPHRINE 0.3 MG/.3ML
0.3 INJECTION SUBCUTANEOUS ONCE AS NEEDED
Status: DISCONTINUED | OUTPATIENT
Start: 2025-02-20 | End: 2025-02-20 | Stop reason: HOSPADM

## 2025-02-20 RX ORDER — PROCHLORPERAZINE EDISYLATE 5 MG/ML
5 INJECTION INTRAMUSCULAR; INTRAVENOUS ONCE AS NEEDED
Status: DISCONTINUED | OUTPATIENT
Start: 2025-02-20 | End: 2025-02-20 | Stop reason: HOSPADM

## 2025-02-20 RX ORDER — ATROPINE SULFATE 0.4 MG/ML
0.4 INJECTION, SOLUTION ENDOTRACHEAL; INTRAMEDULLARY; INTRAMUSCULAR; INTRAVENOUS; SUBCUTANEOUS ONCE AS NEEDED
Status: COMPLETED | OUTPATIENT
Start: 2025-02-20 | End: 2025-02-20

## 2025-02-20 RX ORDER — HEPARIN 100 UNIT/ML
500 SYRINGE INTRAVENOUS
Status: DISCONTINUED | OUTPATIENT
Start: 2025-02-20 | End: 2025-02-20 | Stop reason: HOSPADM

## 2025-02-20 RX ORDER — SODIUM CHLORIDE 0.9 % (FLUSH) 0.9 %
10 SYRINGE (ML) INJECTION
Status: DISCONTINUED | OUTPATIENT
Start: 2025-02-20 | End: 2025-02-20 | Stop reason: HOSPADM

## 2025-02-20 RX ADMIN — SODIUM CHLORIDE: 9 INJECTION, SOLUTION INTRAVENOUS at 10:02

## 2025-02-20 RX ADMIN — IRINOTECAN HYDROCHLORIDE 200 MG: 20 INJECTION INTRAVENOUS at 11:02

## 2025-02-20 RX ADMIN — FLUOROURACIL 3500 MG: 50 INJECTION, SOLUTION INTRAVENOUS at 12:02

## 2025-02-20 RX ADMIN — DEXAMETHASONE SODIUM PHOSPHATE 0.25 MG: 4 INJECTION, SOLUTION INTRA-ARTICULAR; INTRALESIONAL; INTRAMUSCULAR; INTRAVENOUS; SOFT TISSUE at 10:02

## 2025-02-20 RX ADMIN — ATROPINE SULFATE 0.4 MG: 0.4 INJECTION, SOLUTION INTRAVENOUS at 10:02

## 2025-02-20 NOTE — PLAN OF CARE
Pt admitted for  C39 Folfiri. Labs reviewed and and assessment performed. Pt tolerated treatment well.5FU started @ 12:30, pt to RTC on Saturday at 10:30 for pump removal . AVS given to Pt and future appointments reviewed. Pt discharged accompanied by his daughter.

## 2025-02-22 ENCOUNTER — INFUSION (OUTPATIENT)
Dept: INFUSION THERAPY | Facility: HOSPITAL | Age: 75
End: 2025-02-22
Payer: MEDICARE

## 2025-02-22 VITALS
SYSTOLIC BLOOD PRESSURE: 112 MMHG | RESPIRATION RATE: 18 BRPM | DIASTOLIC BLOOD PRESSURE: 74 MMHG | HEIGHT: 67 IN | OXYGEN SATURATION: 100 % | BODY MASS INDEX: 17.92 KG/M2 | WEIGHT: 114.19 LBS | TEMPERATURE: 97 F | HEART RATE: 89 BPM

## 2025-02-22 DIAGNOSIS — C18.9 METASTATIC COLON CANCER TO LIVER: Primary | ICD-10-CM

## 2025-02-22 DIAGNOSIS — C78.7 METASTATIC COLON CANCER TO LIVER: Primary | ICD-10-CM

## 2025-02-22 DIAGNOSIS — I95.9 HYPOTENSION, UNSPECIFIED HYPOTENSION TYPE: ICD-10-CM

## 2025-02-22 PROCEDURE — A4216 STERILE WATER/SALINE, 10 ML: HCPCS | Mod: HCNC | Performed by: REGISTERED NURSE

## 2025-02-22 PROCEDURE — 96377 APPLICATON ON-BODY INJECTOR: CPT | Mod: HCNC

## 2025-02-22 PROCEDURE — 25000003 PHARM REV CODE 250: Mod: HCNC | Performed by: REGISTERED NURSE

## 2025-02-22 PROCEDURE — 63600175 PHARM REV CODE 636 W HCPCS: Mod: JZ,TB,HCNC | Performed by: REGISTERED NURSE

## 2025-02-22 RX ORDER — HEPARIN 100 UNIT/ML
500 SYRINGE INTRAVENOUS
Status: DISCONTINUED | OUTPATIENT
Start: 2025-02-22 | End: 2025-02-22 | Stop reason: HOSPADM

## 2025-02-22 RX ORDER — SODIUM CHLORIDE 0.9 % (FLUSH) 0.9 %
10 SYRINGE (ML) INJECTION
Status: DISCONTINUED | OUTPATIENT
Start: 2025-02-22 | End: 2025-02-22 | Stop reason: HOSPADM

## 2025-02-22 RX ADMIN — HEPARIN SODIUM (PORCINE) LOCK FLUSH IV SOLN 100 UNIT/ML 500 UNITS: 100 SOLUTION at 10:02

## 2025-02-22 RX ADMIN — PEGFILGRASTIM 6 MG: KIT SUBCUTANEOUS at 10:02

## 2025-02-22 RX ADMIN — Medication 10 ML: at 10:02

## 2025-02-22 NOTE — PLAN OF CARE
1050- Pt ambulatory to clinic today for pump d/c. Denies any complaints. Pump completed prior to arrival. Port deaccessed after flushing. Neulasta OBI to left arm, green light flashing upon d/c. Ambulatory from clinic in [facility].

## 2025-02-24 ENCOUNTER — TELEPHONE (OUTPATIENT)
Dept: INTERVENTIONAL RADIOLOGY/VASCULAR | Facility: CLINIC | Age: 75
End: 2025-02-24
Payer: MEDICARE

## 2025-02-28 ENCOUNTER — TELEPHONE (OUTPATIENT)
Dept: INTERVENTIONAL RADIOLOGY/VASCULAR | Facility: CLINIC | Age: 75
End: 2025-02-28
Payer: MEDICARE

## 2025-02-28 NOTE — TELEPHONE ENCOUNTER
Call and spoke with patient daughter to schedule Pre Y90 mapping with Interventional Radiology.  Patient schedule on 3/26/2024 @ 10 am. Labs 3/25.  Gave daughter preop instructions, arrival time and location.

## 2025-03-03 ENCOUNTER — OFFICE VISIT (OUTPATIENT)
Dept: HEMATOLOGY/ONCOLOGY | Facility: CLINIC | Age: 75
End: 2025-03-03
Payer: MEDICARE

## 2025-03-03 ENCOUNTER — LAB VISIT (OUTPATIENT)
Dept: LAB | Facility: HOSPITAL | Age: 75
End: 2025-03-03
Payer: MEDICARE

## 2025-03-03 VITALS
SYSTOLIC BLOOD PRESSURE: 142 MMHG | HEIGHT: 67 IN | DIASTOLIC BLOOD PRESSURE: 70 MMHG | BODY MASS INDEX: 17.99 KG/M2 | RESPIRATION RATE: 18 BRPM | HEART RATE: 84 BPM | WEIGHT: 114.63 LBS | TEMPERATURE: 98 F | OXYGEN SATURATION: 98 %

## 2025-03-03 DIAGNOSIS — R39.9 LOWER URINARY TRACT SYMPTOMS (LUTS): ICD-10-CM

## 2025-03-03 DIAGNOSIS — C78.7 METASTATIC COLON CANCER TO LIVER: Primary | ICD-10-CM

## 2025-03-03 DIAGNOSIS — D84.81 IMMUNODEFICIENCY SECONDARY TO NEOPLASM: ICD-10-CM

## 2025-03-03 DIAGNOSIS — R63.4 WEIGHT DECREASE: ICD-10-CM

## 2025-03-03 DIAGNOSIS — D64.81 ANEMIA ASSOCIATED WITH CHEMOTHERAPY: ICD-10-CM

## 2025-03-03 DIAGNOSIS — K92.2 LOWER GI BLEED: ICD-10-CM

## 2025-03-03 DIAGNOSIS — G89.3 NEOPLASM RELATED PAIN: ICD-10-CM

## 2025-03-03 DIAGNOSIS — K59.03 DRUG-INDUCED CONSTIPATION: ICD-10-CM

## 2025-03-03 DIAGNOSIS — C78.7 METASTATIC COLON CANCER TO LIVER: ICD-10-CM

## 2025-03-03 DIAGNOSIS — C18.9 METASTATIC COLON CANCER TO LIVER: ICD-10-CM

## 2025-03-03 DIAGNOSIS — T45.1X5A ANEMIA ASSOCIATED WITH CHEMOTHERAPY: ICD-10-CM

## 2025-03-03 DIAGNOSIS — T45.1X5A CHEMOTHERAPY INDUCED NEUTROPENIA: ICD-10-CM

## 2025-03-03 DIAGNOSIS — Z79.899 IMMUNODEFICIENCY DUE TO CHEMOTHERAPY: ICD-10-CM

## 2025-03-03 DIAGNOSIS — D70.1 CHEMOTHERAPY INDUCED NEUTROPENIA: ICD-10-CM

## 2025-03-03 DIAGNOSIS — E86.0 DEHYDRATION: ICD-10-CM

## 2025-03-03 DIAGNOSIS — E43 SEVERE MALNUTRITION: ICD-10-CM

## 2025-03-03 DIAGNOSIS — T45.1X5A IMMUNODEFICIENCY DUE TO CHEMOTHERAPY: ICD-10-CM

## 2025-03-03 DIAGNOSIS — D84.821 IMMUNODEFICIENCY DUE TO CHEMOTHERAPY: ICD-10-CM

## 2025-03-03 DIAGNOSIS — D49.9 IMMUNODEFICIENCY SECONDARY TO NEOPLASM: ICD-10-CM

## 2025-03-03 DIAGNOSIS — I95.9 HYPOTENSION, UNSPECIFIED HYPOTENSION TYPE: ICD-10-CM

## 2025-03-03 DIAGNOSIS — C18.9 METASTATIC COLON CANCER TO LIVER: Primary | ICD-10-CM

## 2025-03-03 LAB
ALBUMIN SERPL BCP-MCNC: 2.6 G/DL (ref 3.5–5.2)
ALP SERPL-CCNC: 287 U/L (ref 40–150)
ALT SERPL W/O P-5'-P-CCNC: 8 U/L (ref 10–44)
ANION GAP SERPL CALC-SCNC: 6 MMOL/L (ref 8–16)
AST SERPL-CCNC: 20 U/L (ref 10–40)
BILIRUB SERPL-MCNC: 0.7 MG/DL (ref 0.1–1)
BUN SERPL-MCNC: 3 MG/DL (ref 8–23)
CALCIUM SERPL-MCNC: 8.6 MG/DL (ref 8.7–10.5)
CEA SERPL-MCNC: 73.1 NG/ML (ref 0–5)
CHLORIDE SERPL-SCNC: 107 MMOL/L (ref 95–110)
CO2 SERPL-SCNC: 27 MMOL/L (ref 23–29)
CREAT SERPL-MCNC: 0.7 MG/DL (ref 0.5–1.4)
ERYTHROCYTE [DISTWIDTH] IN BLOOD BY AUTOMATED COUNT: 19.9 % (ref 11.5–14.5)
EST. GFR  (NO RACE VARIABLE): >60 ML/MIN/1.73 M^2
GLUCOSE SERPL-MCNC: 94 MG/DL (ref 70–110)
HCT VFR BLD AUTO: 30.8 % (ref 40–54)
HGB BLD-MCNC: 9.2 G/DL (ref 14–18)
IMM GRANULOCYTES # BLD AUTO: 0.35 K/UL (ref 0–0.04)
MCH RBC QN AUTO: 28.5 PG (ref 27–31)
MCHC RBC AUTO-ENTMCNC: 29.9 G/DL (ref 32–36)
MCV RBC AUTO: 95 FL (ref 82–98)
NEUTROPHILS # BLD AUTO: 17.4 K/UL (ref 1.8–7.7)
PLATELET # BLD AUTO: 185 K/UL (ref 150–450)
PMV BLD AUTO: 10.1 FL (ref 9.2–12.9)
POTASSIUM SERPL-SCNC: 4 MMOL/L (ref 3.5–5.1)
PROT SERPL-MCNC: 6 G/DL (ref 6–8.4)
RBC # BLD AUTO: 3.23 M/UL (ref 4.6–6.2)
SODIUM SERPL-SCNC: 140 MMOL/L (ref 136–145)
WBC # BLD AUTO: 21.24 K/UL (ref 3.9–12.7)

## 2025-03-03 PROCEDURE — 80053 COMPREHEN METABOLIC PANEL: CPT | Mod: HCNC | Performed by: REGISTERED NURSE

## 2025-03-03 PROCEDURE — 1126F AMNT PAIN NOTED NONE PRSNT: CPT | Mod: HCNC,CPTII,S$GLB, | Performed by: INTERNAL MEDICINE

## 2025-03-03 PROCEDURE — 99999 PR PBB SHADOW E&M-EST. PATIENT-LVL III: CPT | Mod: PBBFAC,HCNC,, | Performed by: INTERNAL MEDICINE

## 2025-03-03 PROCEDURE — 3288F FALL RISK ASSESSMENT DOCD: CPT | Mod: HCNC,CPTII,S$GLB, | Performed by: INTERNAL MEDICINE

## 2025-03-03 PROCEDURE — 99215 OFFICE O/P EST HI 40 MIN: CPT | Mod: HCNC,S$GLB,, | Performed by: INTERNAL MEDICINE

## 2025-03-03 PROCEDURE — G2211 COMPLEX E/M VISIT ADD ON: HCPCS | Mod: HCNC,S$GLB,, | Performed by: INTERNAL MEDICINE

## 2025-03-03 PROCEDURE — 36415 COLL VENOUS BLD VENIPUNCTURE: CPT | Mod: HCNC | Performed by: REGISTERED NURSE

## 2025-03-03 PROCEDURE — 82378 CARCINOEMBRYONIC ANTIGEN: CPT | Mod: HCNC | Performed by: REGISTERED NURSE

## 2025-03-03 PROCEDURE — 3008F BODY MASS INDEX DOCD: CPT | Mod: HCNC,CPTII,S$GLB, | Performed by: INTERNAL MEDICINE

## 2025-03-03 PROCEDURE — 1159F MED LIST DOCD IN RCRD: CPT | Mod: HCNC,CPTII,S$GLB, | Performed by: INTERNAL MEDICINE

## 2025-03-03 PROCEDURE — 85027 COMPLETE CBC AUTOMATED: CPT | Mod: HCNC | Performed by: REGISTERED NURSE

## 2025-03-03 PROCEDURE — 1101F PT FALLS ASSESS-DOCD LE1/YR: CPT | Mod: HCNC,CPTII,S$GLB, | Performed by: INTERNAL MEDICINE

## 2025-03-03 PROCEDURE — 3077F SYST BP >= 140 MM HG: CPT | Mod: HCNC,CPTII,S$GLB, | Performed by: INTERNAL MEDICINE

## 2025-03-03 PROCEDURE — 3078F DIAST BP <80 MM HG: CPT | Mod: HCNC,CPTII,S$GLB, | Performed by: INTERNAL MEDICINE

## 2025-03-03 RX ORDER — TAMSULOSIN HYDROCHLORIDE 0.4 MG/1
0.4 CAPSULE ORAL DAILY
Qty: 30 CAPSULE | Refills: 5 | Status: SHIPPED | OUTPATIENT
Start: 2025-03-03 | End: 2026-03-03

## 2025-03-03 NOTE — PROGRESS NOTES
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 Bonilla  12/30/2022: Y-90 to R liver  1/27/2023: Y-90 to R liver  5/17/2023: Y-90 to R liver  7/11/2023: C1 FOLFIRI + Pema  7/26/2023: C2 FOLFIRI + Pema  8/8/2023: C3 FOLFIRI + Pema  8/22/23: C4 FOLFIRI + Pema  Restaging CT CAP 9/1/23: Good response to chemo.  9/7/23: C5 FOLFIRI + Pema  ......................................  Restaging CT CAP 10/12/23: Good response to chemo  10/16/23: C8 FOLFIRI + Pema  .......................................  Restaging CT CAP 12/8/23: Good response to chemo  12/11/23: C12 FOLFIRI + Pema  .......................................  Restaging CT CAP 2/5/24: Good response to chemo  2/8/24: C16 FOLFIRI + Pema    Restaging CT CAP 4/26/24: Good response to chemo  Restaging CT CAP 6/6/24: Good response to chemo  Restaging CT CAP 8/1/24: Stable disease  Restaging CT CAP 10/11/24: Progression of R liver lobe mass.  Plan for Y-90 to this.  Restaging CT CAP 12/20/24: No new extrahepatic disease.  Plan for MWA to left lobe lesion.    Interval History:   He presents today with his daughter prior to cycle 40 of FOLFIRI (off Avastin due to GIB). He feels well. No abdominal pain.  No blood in his stools.  Weight is stable; energy is good.    ECOG status is 1. Presents with his daughter today.    Past  Medical History:   Past Medical History:   Diagnosis Date    MARIA A (acute kidney injury) 2022    Hypertension     Hypotension 2025    Metastatic colon cancer to liver 2021     Past Surgical HIstory:   Past Surgical History:   Procedure Laterality Date    COLONOSCOPY N/A 2021    Procedure: COLONOSCOPY with possible stent;  Surgeon: EDILMA Bonilla MD;  Location: [facility] ENDO (2ND FLR);  Service: Colon and Rectal;  Laterality: N/A;    COLONOSCOPY N/A 11/3/2023    Procedure: COLONOSCOPY;  Surgeon: EDILMA Bonilla MD;  Location: [facility] (4TH FLR);  Service: Endoscopy;  Laterality: N/A;  prep instructions sent to pt via portal  From: EDILMA Bonilla MD  Procedure: Colonoscopy  Diagnosis: Surveillance colonoscopy - Hx of colon cancer  Procedure Timin-12 weeks  Provider: Myself  [place] [facility] [place] [facility]  Additional Scheduling Information: No scheduling con    COLONOSCOPY N/A 7/10/2024    Procedure: COLONOSCOPY;  Surgeon: Tam Pantoja MD;  Location: [facility] (2ND FLR);  Service: Endoscopy;  Laterality: N/A;    DIAGNOSTIC LAPAROSCOPY N/A 2022    Procedure: LAPAROSCOPY, DIAGNOSTIC;  Surgeon: Adrian Rodriguez MD;  [facility];  Service: General;  Laterality: N/A;    INSERTION OF TUNNELED CENTRAL VENOUS CATHETER (CVC) WITH SUBCUTANEOUS PORT N/A 5/3/2021    Procedure: [id number];  Surgeon: Francisco Layton MD;  [facility];  Service: Vascular;  Laterality: N/A;    OMENTECTOMY N/A 10/20/2022    Procedure: OMENTECTOMY;  Surgeon: EDILMA Bonilla MD;  [facility];  Service: Colon and Rectal;  Laterality: N/A;    RIGHT HEMICOLECTOMY N/A 10/20/2022    Procedure: HEMICOLECTOMY, RIGHT, extended;  Surgeon: EDILMA Bonilla MD;  [facility];  Service: Colon and Rectal;  Laterality: N/A;  Extended right hemicolectomy CONSENT IN AM     Family History:   Family History   Problem Relation Name Age of Onset    Cancer Brother         Social History:   reports that he has never smoked. He has never used smokeless tobacco. He reports that he does not currently use alcohol. He reports that he does not use drugs.    Allergies:  Review of patient's allergies indicates:  No Known Allergies    Medications:  Current Outpatient Medications   Medication Sig Dispense Refill    acetaminophen (TYLENOL) 500 MG tablet Take 1 tablet (500 mg total) by mouth every 6 (six) hours as needed for Pain (alternate with ibuprofen).  0    amLODIPine (NORVASC) 10 MG tablet Take 1 tablet (10 mg total) by mouth once daily. 90 tablet 3    amoxicillin-clavulanate 875-125mg (AUGMENTIN) 875-125 mg per tablet Take 1 tablet by mouth 2 (two) times daily. 14 tablet 0    LIDOcaine-prilocaine (EMLA) cream Apply topically as needed (port application). 30 g 3    ondansetron (ZOFRAN) 4 MG tablet Take 1 tablet (4 mg total) by mouth every 8 (eight) hours as needed for Nausea. 30 tablet 3    ondansetron (ZOFRAN) 4 MG tablet Take 1 tablet (4 mg total) by mouth every 8 (eight) hours as needed for Nausea. 30 tablet 0    OPW TEST CLAIM - DO NOT FILL OPW test claim. Do not fill. 1 tablet 0    oxyCODONE (ROXICODONE) 5 MG immediate release tablet Take 1 tablet (5 mg total) by mouth every 6 (six) hours as needed for Pain. 20 tablet 0    oxyCODONE-acetaminophen (PERCOCET) 5-325 mg per tablet Take 1 tablet by mouth every 4 (four) hours as needed for Pain. 25 tablet 0    pantoprazole (PROTONIX) 40 MG tablet Take 1 tablet (40 mg total) by mouth 2 (two) times daily before meals. 180 tablet 3    tamsulosin (FLOMAX) 0.4 mg Cap Take 1 capsule (0.4 mg total) by mouth once daily. 30 capsule 5    traMADoL (ULTRAM) 50 mg tablet Take 1 tablet (50 mg total) by mouth every 6 (six) hours as needed for Pain. 30 tablet 0     No current facility-administered medications for this visit.     Review of Systems   Constitutional:  Positive for fatigue. Negative for activity change, appetite change, chills, diaphoresis, fever and  "unexpected weight change.   HENT:  Negative for voice change.    Eyes:  Negative for visual disturbance.   Respiratory:  Negative for cough.    Cardiovascular:  Negative for chest pain and leg swelling.   Gastrointestinal:  Positive for abdominal distention. Negative for abdominal pain, blood in stool, constipation, diarrhea, nausea and vomiting.   Musculoskeletal:  Negative for arthralgias and back pain.   Skin:  Negative for wound.   Neurological:  Negative for dizziness, weakness and numbness.   Psychiatric/Behavioral:  Negative for confusion and sleep disturbance. The patient is not nervous/anxious.    All other systems reviewed and are negative.    ECOG Performance Status: 1     Objective:      Vitals:   Vitals:    03/03/25 1007   BP: (!) 142/70   BP Location: Left arm   Patient Position: Sitting   Pulse: 84   Resp: 18   Temp: 98 °F (36.7 °C)   TempSrc: Temporal   SpO2: 98%   Weight: 52 kg (114 lb 10.2 oz)   Height: 5' 7" (1.702 m)       Physical Exam  Vitals reviewed.   Constitutional:       General: He is not in acute distress.     Comments: Chronically ill appearing   HENT:      Head: Normocephalic and atraumatic.      Right Ear: External ear normal.      Left Ear: External ear normal.      Nose: Nose normal.      Mouth/Throat:      Pharynx: Oropharynx is clear.   Eyes:      General: No scleral icterus.     Extraocular Movements: Extraocular movements intact.      Conjunctiva/sclera: Conjunctivae normal.   Cardiovascular:      Rate and Rhythm: Normal rate and regular rhythm.      Pulses: Normal pulses.      Heart sounds: Normal heart sounds. No murmur heard.  Pulmonary:      Effort: Pulmonary effort is normal. No respiratory distress.      Breath sounds: Normal breath sounds. No wheezing.   Chest:      Comments: Port to RCW, no signs of infection.  Abdominal:      General: Abdomen is flat. Bowel sounds are normal. There is distension (mild).      Palpations: Abdomen is soft. There is no mass.      " Tenderness: There is no abdominal tenderness.      Comments: Vertical midline abdominal wound well healed   Musculoskeletal:         General: No swelling or deformity. Normal range of motion.      Right lower leg: No edema.      Left lower leg: No edema.   Skin:     Coloration: Skin is not jaundiced or pale.      Findings: No bruising, erythema or rash.   Neurological:      General: No focal deficit present.      Mental Status: He is alert and oriented to person, place, and time. Mental status is at baseline.      Cranial Nerves: No cranial nerve deficit.      Sensory: No sensory deficit.      Gait: Gait normal.   Psychiatric:         Mood and Affect: Mood normal.         Behavior: Behavior normal.         Thought Content: Thought content normal.         Judgment: Judgment normal.     Laboratory Data:  Lab Visit on 03/03/2025   Component Date Value Ref Range Status    CEA 03/03/2025 73.1 (H)  0.0 - 5.0 ng/mL Final    Comment: CEA Normal Range:  Non-Smokers: 0-3.0 ng/mL  Smokers:     0-5.0 ng/mL    The testing method is a chemiluminescent microparticle immunoassay   manufactured by [employer] and performed on the [employer]   or   [employer] system. Values obtained with different assay manufacturers   for   methods may be different and cannot be used interchangeably.      Sodium 03/03/2025 140  136 - 145 mmol/L Final    Potassium 03/03/2025 4.0  3.5 - 5.1 mmol/L Final    Chloride 03/03/2025 107  95 - 110 mmol/L Final    CO2 03/03/2025 27  23 - 29 mmol/L Final    Glucose 03/03/2025 94  70 - 110 mg/dL Final    BUN 03/03/2025 3 (L)  8 - 23 mg/dL Final    Creatinine 03/03/2025 0.7  0.5 - 1.4 mg/dL Final    Calcium 03/03/2025 8.6 (L)  8.7 - 10.5 mg/dL Final    Total Protein 03/03/2025 6.0  6.0 - 8.4 g/dL Final    Albumin 03/03/2025 2.6 (L)  3.5 - 5.2 g/dL Final    Total Bilirubin 03/03/2025 0.7  0.1 - 1.0 mg/dL Final    Comment: For infants and newborns, interpretation of results should be based  on gestational  age, weight and in agreement with clinical  observations.    Premature Infant recommended reference ranges:  Up to 24 hours.............<8.0 mg/dL  Up to 48 hours............<12.0 mg/dL  3-5 days..................<15.0 mg/dL  6-29 days.................<15.0 mg/dL      Alkaline Phosphatase 03/03/2025 287 (H)  40 - 150 U/L Final    AST 03/03/2025 20  10 - 40 U/L Final    ALT 03/03/2025 8 (L)  10 - 44 U/L Final    eGFR 03/03/2025 >60.0  >60 mL/min/1.73 m^2 Final    Anion Gap 03/03/2025 6 (L)  8 - 16 mmol/L Final    WBC 03/03/2025 21.24 (H)  3.90 - 12.70 K/uL Final    RBC 03/03/2025 3.23 (L)  4.60 - 6.20 M/uL Final    Hemoglobin 03/03/2025 9.2 (L)  14.0 - 18.0 g/dL Final    Hematocrit 03/03/2025 30.8 (L)  40.0 - 54.0 % Final    MCV 03/03/2025 95  82 - 98 fL Final    MCH 03/03/2025 28.5  27.0 - 31.0 pg Final    MCHC 03/03/2025 29.9 (L)  32.0 - 36.0 g/dL Final    RDW 03/03/2025 19.9 (H)  11.5 - 14.5 % Final    Platelets 03/03/2025 185  150 - 450 K/uL Final    MPV 03/03/2025 10.1  9.2 - 12.9 fL Final    Gran # (ANC) 03/03/2025 17.4 (H)  1.8 - 7.7 K/uL Final    Comment: The ANC is based on a white cell differential from an   automated cell counter. It has not been microscopically   reviewed for the presence of abnormal cells. Clinical   correlation is required.      Immature Grans (Abs) 03/03/2025 0.35 (H)  0.00 - 0.04 K/uL Final    Comment: Mild elevation in immature granulocytes is non specific and   can be seen in a variety of conditions including stress response,   acute inflammation, trauma and pregnancy. Correlation with other   laboratory and clinical findings is essential.       Assessment and Plan        1. Metastatic colon cancer to liver    2. Immunodeficiency secondary to neoplasm    3. Immunodeficiency due to chemotherapy    4. Chemotherapy induced neutropenia    5. Lower GI bleed    6. Anemia associated with chemotherapy    7. Weight decrease    8. Drug-induced constipation    9. Severe malnutrition    10.  Neoplasm related pain    11. Hypotension, unspecified hypotension type    12. Lower urinary tract symptoms (LUTS)    13. Dehydration          1-6.  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  Guardant 360 was negative for BRAF, VIRI, HER2 amplification.   Pretreatment CEA 57.  We had a long and sonia discussion with him about his diagnosis. Unfortunately, the disease is not curable but remains treatable and he has good performance status.   Restaging scans after 7 cycles of FOLFOX + Pema showed significant reduction in colonic mass and liver mass.   we switched to maintenance as of cycle 8 with 5FU + Pema  Restaging scans from March 2022 show stable disease.   MRI on 7/18/22 showing hepatic progression of disease in the right hepatic lobe noted on the exam. CT CAP on 8/26 shows some mild progression in both liver metastases.    Discussed case at colorectal tumor board 8/31/22 and had a diagnostic lap performed by Dr. Rodriguez on 9/7 to assess for any occult peritoneal disease. Findings from the diagnostic lap were negative. Fluid from around from around the primary mass was sent for cytology that was negative for malignancy.   Underwent primary tumor resection on 10/20/22 with Dr. Bonilla.    Meanwhile his liver mets had grown.  We discussed his case at CRC conference with plans for short term Y-90 and then restarting chemotherapy prior to considering any surgical options to his liver metastases.  He underwent Y-90 delivery on 12/30/22. Tolerated well.   CT CAP on 1/23/23 reviewed at [facility] conference with good response to Y-90, no new lesions.  Underwent second Y-90 on 1/27/23. Can consider R hepatectomy pending follow-up imaging after Y-90.     Received cycle 42 of FOLFOX (omitted oxaliplatin again starting cycle 41 due to neuropathy) on 2/9/23.  Because of 5-FU shortage nationally, we have been holding chemotherapy since mid-February 2023.  If he needs to restart chemotherapy (I.e.  no plans for surgical resection), will place him on capecitabine maintenance for duration of 5-FU shortage.     Discussed at colorectal liver mets tumor board 3/16/23.  Plan for repeat Y-90 and then consideration of surgical resection and he will meet with liver transplant team as well.  Underwent Y-90 delivery 5/17/23 with IR.     Has been on maintenance Xeloda since late April 2023.    Met with liver transplant team but ultimately opted not to proceed with transplant as he was concerned about how he would tolerate a major surgery with the life changes that would come after transplant as well. They closed out his case.    He met with Dr. Rodriugez to discuss whether surgical resection is indicated for his liver mets.  He recommended repeat MRI.  Repeat MRI unfortunately showed disease progression while on Xeloda maintenance.  Similarly his CEA garrett precipitously, all in keeping with worsening disease.    We recommended we restart IV chemotherapy with FOLFIRI + Avastin (never had irinotecan).    Because of hyperbilirubinemia he received cycle 1 with just 5-FU + Avastin on 7/11/23. Tolerated this well. Bilirubin has improved.  Received irinotecan starting with cycle 2.  Repeat CT CAP after cycle 4 shows good response to therapy.   Excellent tolerance. [facility]CRC tumor board agrees with continuation of chemotherapy.   Repeat CT CAP after cycle 7 shows stable disease, no evidence of new or worsening disease.   Repeat CT CAP after cycle 11 shows stable disease.   Repeat CT CAP after cycle 15 shows improved disease.  Repeat CT CAP after cycle 21 shows stable disease.   Repeat CT CAP after cycle 24 shows stable disease.    Hospitalized after cycle 26 and 27 (without Avastin) for hematochezia.  Colonoscopy with likely diverticular bleed.  Required 2 units of blood in last month.  Will continue to hold bevacizumab indefinitely.  Discussed that we don't have a very good way preventing this recurrence and chemo with its associated  thrombocytopenia may make this more likely to occur again.  He still wishes to proceed, which is my recommendation as well.    Repeat CT CAP after cycle 28 shows stable disease and recc to continue with chemotherapy.   Repeat CT CAP after cycle 32 shows progression of R liver lobe metastasis with rising CEA.  Will plan for Y-90 to growing lesion.  Underwent mapping 11/5/24 and delivery 11/21/24.  CT CAP 12/20/24 shows otherwise stable disease.    Underwent L lobe MWA per Dr. Vidal on 2/10/25.     Will continue FOLFIRI in the meantime.    Delayed cycle 33 due to neutropenia - has since recovered and on Neulasta.    - I have reviewed the CBC and CMP, adequate for treatment.  - Proceed with cycle 40 of FOLFIRI on Thursday + OBI Neulasta.    RTC in 2 weeks for next cycle with CT prior.  Has upcoming Y90 mapping scheduled; may not be able to get another dose; will depend on preparatory imaging.  Discussed that we can try to recycle FOLFOX vs [place]f if he has progression on upcoming scan.    Tempus xT: APC, CKS1B cng, ERCC3 cnl, PIK3CA; KENIA, TMB 12.1.  Tempus xF: APC, KRAS Q61H, PIK3CA, TP53; KENIA    7-10. Neoplasm related pain, weight loss, constipation, malnutrition  -- Pain very well controlled. Has oxycodone 5mg to use PRN but not requiring now.  -- Following with nutrition. Encouraged increased protein. Monitor.  Weight stable.  -- Continue Miralax as needed for constipation. Doing well with this.     11. Hypotension   -- BP normal today.   -- Following with PCP.   -- Encouraged him and his daughter to monitor BP and HR closely at home.     12-13. Urinary Hesitancy/dehydration  -- Continue Flomax.   -- Reported improvement since starting medication.     RTC in 2 weeks    Patient is in agreement with the proposed treatment plan. All questions were answered to the patient's satisfaction. Pt knows to call clinic if anything is needed before the next clinic visit.     code applied: patient requires or will require  a continuous, longitudinal, and active collaborative plan of care related to this patient's health condition, colon cancer --the management of which requires the direction of a practitioner with specialized clinical knowledge, skill, and expertise.     Bunny Schuster MD  Hematology/Oncology  [facility] [place]      Route Chart for Scheduling    Med Onc Chart Routing      Follow up with physician 4 weeks. for FOLFIRI   Follow up with RACHEL 2 weeks. for FOLFIRI   Infusion scheduling note    Injection scheduling note    Labs CBC, CMP and CEA   Scheduling:  Preferred lab:  Lab interval: every 2 weeks     Imaging    Pharmacy appointment    Other referrals

## 2025-03-05 RX ORDER — HEPARIN 100 UNIT/ML
500 SYRINGE INTRAVENOUS
Status: CANCELLED | OUTPATIENT
Start: 2025-03-08

## 2025-03-05 RX ORDER — SODIUM CHLORIDE 0.9 % (FLUSH) 0.9 %
10 SYRINGE (ML) INJECTION
Status: CANCELLED | OUTPATIENT
Start: 2025-03-08

## 2025-03-06 ENCOUNTER — INFUSION (OUTPATIENT)
Dept: INFUSION THERAPY | Facility: HOSPITAL | Age: 75
End: 2025-03-06
Payer: MEDICARE

## 2025-03-06 VITALS
TEMPERATURE: 98 F | SYSTOLIC BLOOD PRESSURE: 136 MMHG | WEIGHT: 114.88 LBS | RESPIRATION RATE: 18 BRPM | HEIGHT: 67 IN | BODY MASS INDEX: 18.03 KG/M2 | DIASTOLIC BLOOD PRESSURE: 72 MMHG | HEART RATE: 70 BPM

## 2025-03-06 DIAGNOSIS — C78.7 METASTATIC COLON CANCER TO LIVER: Primary | ICD-10-CM

## 2025-03-06 DIAGNOSIS — C18.9 METASTATIC COLON CANCER TO LIVER: Primary | ICD-10-CM

## 2025-03-06 DIAGNOSIS — I95.9 HYPOTENSION, UNSPECIFIED HYPOTENSION TYPE: ICD-10-CM

## 2025-03-06 PROCEDURE — 63600175 PHARM REV CODE 636 W HCPCS: Mod: HCNC | Performed by: INTERNAL MEDICINE

## 2025-03-06 PROCEDURE — 96367 TX/PROPH/DG ADDL SEQ IV INF: CPT | Mod: HCNC

## 2025-03-06 PROCEDURE — 96375 TX/PRO/DX INJ NEW DRUG ADDON: CPT | Mod: HCNC

## 2025-03-06 PROCEDURE — 25000003 PHARM REV CODE 250: Mod: HCNC | Performed by: INTERNAL MEDICINE

## 2025-03-06 PROCEDURE — 96416 CHEMO PROLONG INFUSE W/PUMP: CPT | Mod: HCNC

## 2025-03-06 PROCEDURE — 96413 CHEMO IV INFUSION 1 HR: CPT | Mod: HCNC

## 2025-03-06 RX ORDER — ATROPINE SULFATE 0.4 MG/ML
0.4 INJECTION, SOLUTION ENDOTRACHEAL; INTRAMEDULLARY; INTRAMUSCULAR; INTRAVENOUS; SUBCUTANEOUS ONCE AS NEEDED
Status: COMPLETED | OUTPATIENT
Start: 2025-03-06 | End: 2025-03-06

## 2025-03-06 RX ORDER — HEPARIN 100 UNIT/ML
500 SYRINGE INTRAVENOUS
Status: DISCONTINUED | OUTPATIENT
Start: 2025-03-06 | End: 2025-03-06 | Stop reason: HOSPADM

## 2025-03-06 RX ORDER — EPINEPHRINE 0.3 MG/.3ML
0.3 INJECTION SUBCUTANEOUS ONCE AS NEEDED
Status: DISCONTINUED | OUTPATIENT
Start: 2025-03-06 | End: 2025-03-06 | Stop reason: HOSPADM

## 2025-03-06 RX ORDER — DIPHENHYDRAMINE HYDROCHLORIDE 50 MG/ML
50 INJECTION INTRAMUSCULAR; INTRAVENOUS ONCE AS NEEDED
Status: DISCONTINUED | OUTPATIENT
Start: 2025-03-06 | End: 2025-03-06 | Stop reason: HOSPADM

## 2025-03-06 RX ORDER — PROCHLORPERAZINE EDISYLATE 5 MG/ML
5 INJECTION INTRAMUSCULAR; INTRAVENOUS ONCE AS NEEDED
Status: DISCONTINUED | OUTPATIENT
Start: 2025-03-06 | End: 2025-03-06 | Stop reason: HOSPADM

## 2025-03-06 RX ORDER — SODIUM CHLORIDE 0.9 % (FLUSH) 0.9 %
10 SYRINGE (ML) INJECTION
Status: DISCONTINUED | OUTPATIENT
Start: 2025-03-06 | End: 2025-03-06 | Stop reason: HOSPADM

## 2025-03-06 RX ADMIN — DEXAMETHASONE SODIUM PHOSPHATE 0.25 MG: 4 INJECTION, SOLUTION INTRA-ARTICULAR; INTRALESIONAL; INTRAMUSCULAR; INTRAVENOUS; SOFT TISSUE at 01:03

## 2025-03-06 RX ADMIN — IRINOTECAN HYDROCHLORIDE 200 MG: 20 INJECTION INTRAVENOUS at 01:03

## 2025-03-06 RX ADMIN — FLUOROURACIL 3500 MG: 50 INJECTION, SOLUTION INTRAVENOUS at 03:03

## 2025-03-06 RX ADMIN — ATROPINE SULFATE 0.4 MG: 0.4 INJECTION, SOLUTION INTRAVENOUS at 01:03

## 2025-03-06 RX ADMIN — SODIUM CHLORIDE: 9 INJECTION, SOLUTION INTRAVENOUS at 01:03

## 2025-03-06 NOTE — PLAN OF CARE
Pt tolerated irinotecan without issue. Discharged with 5FU pump infusing with descending volume noted. Pt returns on 6/8/25 for disconnect at 1130

## 2025-03-08 ENCOUNTER — INFUSION (OUTPATIENT)
Dept: INFUSION THERAPY | Facility: HOSPITAL | Age: 75
End: 2025-03-08
Payer: MEDICARE

## 2025-03-08 VITALS
HEIGHT: 67 IN | HEART RATE: 96 BPM | SYSTOLIC BLOOD PRESSURE: 132 MMHG | TEMPERATURE: 97 F | WEIGHT: 114.88 LBS | RESPIRATION RATE: 18 BRPM | DIASTOLIC BLOOD PRESSURE: 78 MMHG | OXYGEN SATURATION: 100 % | BODY MASS INDEX: 18.03 KG/M2

## 2025-03-08 DIAGNOSIS — C78.7 METASTATIC COLON CANCER TO LIVER: Primary | ICD-10-CM

## 2025-03-08 DIAGNOSIS — C18.9 METASTATIC COLON CANCER TO LIVER: Primary | ICD-10-CM

## 2025-03-08 DIAGNOSIS — I95.9 HYPOTENSION, UNSPECIFIED HYPOTENSION TYPE: ICD-10-CM

## 2025-03-08 PROCEDURE — 63600175 PHARM REV CODE 636 W HCPCS: Mod: HCNC | Performed by: INTERNAL MEDICINE

## 2025-03-08 PROCEDURE — 96377 APPLICATON ON-BODY INJECTOR: CPT | Mod: HCNC

## 2025-03-08 PROCEDURE — A4216 STERILE WATER/SALINE, 10 ML: HCPCS | Mod: HCNC | Performed by: INTERNAL MEDICINE

## 2025-03-08 PROCEDURE — 25000003 PHARM REV CODE 250: Mod: HCNC | Performed by: INTERNAL MEDICINE

## 2025-03-08 RX ORDER — SODIUM CHLORIDE 0.9 % (FLUSH) 0.9 %
10 SYRINGE (ML) INJECTION
Status: DISCONTINUED | OUTPATIENT
Start: 2025-03-08 | End: 2025-03-08 | Stop reason: HOSPADM

## 2025-03-08 RX ORDER — HEPARIN 100 UNIT/ML
500 SYRINGE INTRAVENOUS
Status: DISCONTINUED | OUTPATIENT
Start: 2025-03-08 | End: 2025-03-08 | Stop reason: HOSPADM

## 2025-03-08 RX ADMIN — HEPARIN SODIUM (PORCINE) LOCK FLUSH IV SOLN 100 UNIT/ML 500 UNITS: 100 SOLUTION at 11:03

## 2025-03-08 RX ADMIN — PEGFILGRASTIM 6 MG: KIT SUBCUTANEOUS at 11:03

## 2025-03-08 RX ADMIN — Medication 10 ML: at 11:03

## 2025-03-08 NOTE — PLAN OF CARE
"/78 (Patient Position: Sitting)   Pulse 96   Temp 97 °F (36.1 °C)   Resp 18   Ht 5' 7" (1.702 m)   Wt 52.1 kg (114 lb 13.8 oz)   SpO2 100%   BMI 17.99 kg/m²   Pleasant, alert and oriented patient to Chemo Infusion per self with family for pump d/c with Neulasta OBI - VSS and RCW port flushed with NS/Heparin, blood return observed, pump disconnected, port de-accessed, band-aide applied, OBI placed to PARMINDER and patient discharged to home with no concerns - RTC on 3/20/25  "

## 2025-03-17 ENCOUNTER — PATIENT MESSAGE (OUTPATIENT)
Dept: INTERVENTIONAL RADIOLOGY/VASCULAR | Facility: HOSPITAL | Age: 75
End: 2025-03-17
Payer: MEDICARE

## 2025-03-18 ENCOUNTER — HOSPITAL ENCOUNTER (OUTPATIENT)
Dept: RADIOLOGY | Facility: HOSPITAL | Age: 75
Discharge: HOME | End: 2025-03-18
Attending: REGISTERED NURSE
Payer: MEDICARE

## 2025-03-18 DIAGNOSIS — C18.9 METASTATIC COLON CANCER TO LIVER: ICD-10-CM

## 2025-03-18 DIAGNOSIS — C78.7 METASTATIC COLON CANCER TO LIVER: ICD-10-CM

## 2025-03-18 PROCEDURE — A9698 NON-RAD CONTRAST MATERIALNOC: HCPCS | Mod: HCNC | Performed by: REGISTERED NURSE

## 2025-03-18 PROCEDURE — 71260 CT THORAX DX C+: CPT | Mod: 26,HCNC,, | Performed by: RADIOLOGY

## 2025-03-18 PROCEDURE — 25500020 PHARM REV CODE 255: Mod: HCNC | Performed by: REGISTERED NURSE

## 2025-03-18 PROCEDURE — 71260 CT THORAX DX C+: CPT | Mod: TC,HCNC

## 2025-03-18 PROCEDURE — 74177 CT ABD & PELVIS W/CONTRAST: CPT | Mod: 26,HCNC,, | Performed by: RADIOLOGY

## 2025-03-18 RX ADMIN — IOHEXOL 75 ML: 350 INJECTION, SOLUTION INTRAVENOUS at 01:03

## 2025-03-18 RX ADMIN — BARIUM SULFATE 450 ML: 20 SUSPENSION ORAL at 12:03

## 2025-03-19 ENCOUNTER — RESULTS FOLLOW-UP (OUTPATIENT)
Dept: HEMATOLOGY/ONCOLOGY | Facility: CLINIC | Age: 75
End: 2025-03-19

## 2025-03-20 ENCOUNTER — OFFICE VISIT (OUTPATIENT)
Dept: HEMATOLOGY/ONCOLOGY | Facility: CLINIC | Age: 75
End: 2025-03-20
Payer: MEDICARE

## 2025-03-20 VITALS
SYSTOLIC BLOOD PRESSURE: 117 MMHG | OXYGEN SATURATION: 100 % | DIASTOLIC BLOOD PRESSURE: 80 MMHG | HEART RATE: 92 BPM | WEIGHT: 112.44 LBS | HEIGHT: 67 IN | TEMPERATURE: 99 F | BODY MASS INDEX: 17.65 KG/M2 | RESPIRATION RATE: 18 BRPM

## 2025-03-20 DIAGNOSIS — C78.7 METASTATIC COLON CANCER TO LIVER: Primary | ICD-10-CM

## 2025-03-20 DIAGNOSIS — D49.9 IMMUNODEFICIENCY SECONDARY TO NEOPLASM: ICD-10-CM

## 2025-03-20 DIAGNOSIS — R16.0 LIVER MASSES: ICD-10-CM

## 2025-03-20 DIAGNOSIS — T45.1X5A IMMUNODEFICIENCY DUE TO CHEMOTHERAPY: ICD-10-CM

## 2025-03-20 DIAGNOSIS — I95.9 HYPOTENSION, UNSPECIFIED HYPOTENSION TYPE: ICD-10-CM

## 2025-03-20 DIAGNOSIS — D84.81 IMMUNODEFICIENCY SECONDARY TO NEOPLASM: ICD-10-CM

## 2025-03-20 DIAGNOSIS — R63.4 WEIGHT DECREASE: ICD-10-CM

## 2025-03-20 DIAGNOSIS — G89.3 NEOPLASM RELATED PAIN: ICD-10-CM

## 2025-03-20 DIAGNOSIS — T45.1X5A CHEMOTHERAPY INDUCED NEUTROPENIA: ICD-10-CM

## 2025-03-20 DIAGNOSIS — D84.821 IMMUNODEFICIENCY DUE TO CHEMOTHERAPY: ICD-10-CM

## 2025-03-20 DIAGNOSIS — K59.03 DRUG-INDUCED CONSTIPATION: ICD-10-CM

## 2025-03-20 DIAGNOSIS — D64.81 ANEMIA ASSOCIATED WITH CHEMOTHERAPY: ICD-10-CM

## 2025-03-20 DIAGNOSIS — E86.0 DEHYDRATION: ICD-10-CM

## 2025-03-20 DIAGNOSIS — E43 SEVERE MALNUTRITION: ICD-10-CM

## 2025-03-20 DIAGNOSIS — D70.1 CHEMOTHERAPY INDUCED NEUTROPENIA: ICD-10-CM

## 2025-03-20 DIAGNOSIS — K92.2 LOWER GI BLEED: ICD-10-CM

## 2025-03-20 DIAGNOSIS — C18.9 METASTATIC COLON CANCER TO LIVER: Primary | ICD-10-CM

## 2025-03-20 DIAGNOSIS — R39.9 LOWER URINARY TRACT SYMPTOMS (LUTS): ICD-10-CM

## 2025-03-20 DIAGNOSIS — Z79.899 IMMUNODEFICIENCY DUE TO CHEMOTHERAPY: ICD-10-CM

## 2025-03-20 DIAGNOSIS — T45.1X5A ANEMIA ASSOCIATED WITH CHEMOTHERAPY: ICD-10-CM

## 2025-03-20 PROCEDURE — 1101F PT FALLS ASSESS-DOCD LE1/YR: CPT | Mod: HCNC,CPTII,S$GLB, | Performed by: INTERNAL MEDICINE

## 2025-03-20 PROCEDURE — 1159F MED LIST DOCD IN RCRD: CPT | Mod: HCNC,CPTII,S$GLB, | Performed by: INTERNAL MEDICINE

## 2025-03-20 PROCEDURE — G2211 COMPLEX E/M VISIT ADD ON: HCPCS | Mod: HCNC,S$GLB,, | Performed by: INTERNAL MEDICINE

## 2025-03-20 PROCEDURE — 3074F SYST BP LT 130 MM HG: CPT | Mod: HCNC,CPTII,S$GLB, | Performed by: INTERNAL MEDICINE

## 2025-03-20 PROCEDURE — 99999 PR PBB SHADOW E&M-EST. PATIENT-LVL III: CPT | Mod: PBBFAC,HCNC,, | Performed by: INTERNAL MEDICINE

## 2025-03-20 PROCEDURE — 3288F FALL RISK ASSESSMENT DOCD: CPT | Mod: HCNC,CPTII,S$GLB, | Performed by: INTERNAL MEDICINE

## 2025-03-20 PROCEDURE — 1126F AMNT PAIN NOTED NONE PRSNT: CPT | Mod: HCNC,CPTII,S$GLB, | Performed by: INTERNAL MEDICINE

## 2025-03-20 PROCEDURE — 99215 OFFICE O/P EST HI 40 MIN: CPT | Mod: HCNC,S$GLB,, | Performed by: INTERNAL MEDICINE

## 2025-03-20 PROCEDURE — 3079F DIAST BP 80-89 MM HG: CPT | Mod: HCNC,CPTII,S$GLB, | Performed by: INTERNAL MEDICINE

## 2025-03-20 PROCEDURE — 3008F BODY MASS INDEX DOCD: CPT | Mod: HCNC,CPTII,S$GLB, | Performed by: INTERNAL MEDICINE

## 2025-03-20 RX ORDER — OXYCODONE AND ACETAMINOPHEN 5; 325 MG/1; MG/1
1 TABLET ORAL EVERY 6 HOURS PRN
Qty: 60 TABLET | Refills: 0 | Status: SHIPPED | OUTPATIENT
Start: 2025-03-20

## 2025-03-20 NOTE — PROGRESS NOTES
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 Bonilla  12/30/2022: Y-90 to R liver  1/27/2023: Y-90 to R liver  5/17/2023: Y-90 to R liver  7/11/2023: C1 FOLFIRI + Pema  7/26/2023: C2 FOLFIRI + Pema  8/8/2023: C3 FOLFIRI + Pema  8/22/23: C4 FOLFIRI + Pema  Restaging CT CAP 9/1/23: Good response to chemo.  9/7/23: C5 FOLFIRI + Pema  ......................................  Restaging CT CAP 10/12/23: Good response to chemo  10/16/23: C8 FOLFIRI + Pema  .......................................  Restaging CT CAP 12/8/23: Good response to chemo  12/11/23: C12 FOLFIRI + Pema  .......................................  Restaging CT CAP 2/5/24: Good response to chemo  2/8/24: C16 FOLFIRI + Pema    Restaging CT CAP 4/26/24: Good response to chemo  Restaging CT CAP 6/6/24: Good response to chemo  Restaging CT CAP 8/1/24: Stable disease  Restaging CT CAP 10/11/24: Progression of R liver lobe mass.  Plan for Y-90 to this.  Restaging CT CAP 12/20/24: No new extrahepatic disease.  Plan for MWA to left lobe lesion.  Restaging CT CAP 3/18/25: Disease progression in liver.    Interval History:   He presents today with his daughter prior to cycle 41 of FOLFIRI (off Avastin due to GIB). He states he is eating well and his strength is good.  Does note some new lower back midline pain.  Has  lost two pounds.    ECOG status is 1. Presents with his daughter today.    Past Medical History:   Past Medical History:   Diagnosis Date    MARIA A (acute kidney injury) 2022    Hypertension     Hypotension 2025    Metastatic colon cancer to liver 2021     Past Surgical HIstory:   Past Surgical History:   Procedure Laterality Date    COLONOSCOPY N/A 2021    Procedure: COLONOSCOPY with possible stent;  Surgeon: EDILMA Bonilla MD;  Location: [facility] ENDO (2ND FLR);  Service: Colon and Rectal;  Laterality: N/A;    COLONOSCOPY N/A 11/3/2023    Procedure: COLONOSCOPY;  Surgeon: EDILMA Bonilla MD;  Location: [facility] ENDO (4TH FLR);  Service: Endoscopy;  Laterality: N/A;  prep instructions sent to pt via portal  From: EDILMA Bonilla MD  Procedure: Colonoscopy  Diagnosis: Surveillance colonoscopy - Hx of colon cancer  Procedure Timin-12 weeks  Provider: Myself  Location: [facility] 4Endo  Additional Scheduling Information: No scheduling con    COLONOSCOPY N/A 7/10/2024    Procedure: COLONOSCOPY;  Surgeon: Tam Pantoja MD;  Location: [facility] ENDO (2ND FLR);  Service: Endoscopy;  Laterality: N/A;    DIAGNOSTIC LAPAROSCOPY N/A 2022    Procedure: LAPAROSCOPY, DIAGNOSTIC;  Surgeon: Adrian Rodriguez MD;  Location: [facility] OR 2ND FLR;  Service: General;  Laterality: N/A;    INSERTION OF TUNNELED CENTRAL VENOUS CATHETER (CVC) WITH SUBCUTANEOUS PORT N/A 5/3/2021    Procedure: [id number];  Surgeon: Francisco Layton MD;  Location: [facility] OR 2ND FLR;  Service: Vascular;  Laterality: N/A;    OMENTECTOMY N/A 10/20/2022    Procedure: OMENTECTOMY;  Surgeon: EDILMA Bonilla MD;  Location: [facility] OR 2ND FLR;  Service: Colon and Rectal;  Laterality: N/A;    RIGHT HEMICOLECTOMY N/A 10/20/2022    Procedure: HEMICOLECTOMY, RIGHT, extended;  Surgeon: EDILMA Bonilla MD;  Location: [facility] OR 2ND FLR;  Service: Colon and Rectal;  Laterality: N/A;  Extended right hemicolectomy CONSENT IN AM     Family History:   Family  History   Problem Relation Name Age of Onset    Cancer Brother         Social History:  reports that he has never smoked. He has never used smokeless tobacco. He reports that he does not currently use alcohol. He reports that he does not use drugs.    Allergies:  Review of patient's allergies indicates:  No Known Allergies    Medications:  Current Outpatient Medications   Medication Sig Dispense Refill    acetaminophen (TYLENOL) 500 MG tablet Take 1 tablet (500 mg total) by mouth every 6 (six) hours as needed for Pain (alternate with ibuprofen).  0    amLODIPine (NORVASC) 10 MG tablet Take 1 tablet (10 mg total) by mouth once daily. 90 tablet 3    amoxicillin-clavulanate 875-125mg (AUGMENTIN) 875-125 mg per tablet Take 1 tablet by mouth 2 (two) times daily. 14 tablet 0    LIDOcaine-prilocaine (EMLA) cream Apply topically as needed (port application). 30 g 3    ondansetron (ZOFRAN) 4 MG tablet Take 1 tablet (4 mg total) by mouth every 8 (eight) hours as needed for Nausea. 30 tablet 3    ondansetron (ZOFRAN) 4 MG tablet Take 1 tablet (4 mg total) by mouth every 8 (eight) hours as needed for Nausea. 30 tablet 0    OPW TEST CLAIM - DO NOT FILL OPW test claim. Do not fill. 1 tablet 0    pantoprazole (PROTONIX) 40 MG tablet Take 1 tablet (40 mg total) by mouth 2 (two) times daily before meals. 180 tablet 3    tamsulosin (FLOMAX) 0.4 mg Cap Take 1 capsule (0.4 mg total) by mouth once daily. 30 capsule 5    traMADoL (ULTRAM) 50 mg tablet Take 1 tablet (50 mg total) by mouth every 6 (six) hours as needed for Pain. 30 tablet 0    oxyCODONE-acetaminophen (PERCOCET) 5-325 mg per tablet Take 1 tablet by mouth every 6 (six) hours as needed for Pain. 60 tablet 0    trifluridine-tipiraciL (LONSURF) 15-6.14 mg Tab 3 tablets twice daily on days 1 thru 5 of each 14 day cycle.. 60 tablet 5     No current facility-administered medications for this visit.     Review of Systems   Constitutional:  Positive for fatigue. Negative for  "activity change, appetite change, chills, diaphoresis, fever and unexpected weight change.   HENT:  Negative for voice change.    Eyes:  Negative for visual disturbance.   Respiratory:  Negative for cough.    Cardiovascular:  Negative for chest pain and leg swelling.   Gastrointestinal:  Negative for abdominal pain, blood in stool, constipation, diarrhea, nausea and vomiting.   Musculoskeletal:  Negative for arthralgias and back pain.   Skin:  Negative for wound.   Neurological:  Negative for dizziness, weakness and numbness.   Psychiatric/Behavioral:  Negative for confusion and sleep disturbance. The patient is not nervous/anxious.    All other systems reviewed and are negative.    ECOG Performance Status: 1     Objective:      Vitals:   Vitals:    03/20/25 1023   BP: 117/80   BP Location: Left arm   Patient Position: Sitting   Pulse: 92   Resp: 18   Temp: 99.1 °F (37.3 °C)   TempSrc: Temporal   SpO2: 100%   Weight: 51 kg (112 lb 7 oz)   Height: 5' 7" (1.702 m)       Physical Exam  Vitals reviewed.   Constitutional:       General: He is not in acute distress.     Comments: Chronically ill appearing   HENT:      Head: Normocephalic and atraumatic.      Right Ear: External ear normal.      Left Ear: External ear normal.      Nose: Nose normal.      Mouth/Throat:      Pharynx: Oropharynx is clear.   Eyes:      General: No scleral icterus.     Extraocular Movements: Extraocular movements intact.      Conjunctiva/sclera: Conjunctivae normal.   Cardiovascular:      Rate and Rhythm: Normal rate and regular rhythm.      Pulses: Normal pulses.      Heart sounds: Normal heart sounds. No murmur heard.  Pulmonary:      Effort: Pulmonary effort is normal. No respiratory distress.      Breath sounds: Normal breath sounds. No wheezing.   Chest:      Comments: Port to RCW, no signs of infection.  Abdominal:      General: Abdomen is flat. Bowel sounds are normal. There is distension (mild).      Palpations: Abdomen is soft. There " is no mass.      Tenderness: There is no abdominal tenderness.      Comments: Vertical midline abdominal wound well healed   Musculoskeletal:         General: No swelling or deformity. Normal range of motion.      Right lower leg: No edema.      Left lower leg: No edema.   Skin:     Coloration: Skin is not jaundiced or pale.      Findings: No bruising, erythema or rash.   Neurological:      General: No focal deficit present.      Mental Status: He is alert and oriented to person, place, and time. Mental status is at baseline.      Cranial Nerves: No cranial nerve deficit.      Sensory: No sensory deficit.      Gait: Gait normal.   Psychiatric:         Mood and Affect: Mood normal.         Behavior: Behavior normal.         Thought Content: Thought content normal.         Judgment: Judgment normal.     Laboratory Data:  Lab Visit on 03/18/2025   Component Date Value Ref Range Status    CEA 03/18/2025 96.7 (H)  0.0 - 5.0 ng/mL Final    Comment: CEA Normal Range:  Non-Smokers: 0-3.0 ng/mL  Smokers:     0-5.0 ng/mL    The testing method is a chemiluminescent microparticle immunoassay   manufactured by [employer] and performed on the [employer]   or   [employer] system. Values obtained with different assay manufacturers   for   methods may be different and cannot be used interchangeably.      Sodium 03/18/2025 142  136 - 145 mmol/L Final    Potassium 03/18/2025 3.3 (L)  3.5 - 5.1 mmol/L Final    Chloride 03/18/2025 107  95 - 110 mmol/L Final    CO2 03/18/2025 22 (L)  23 - 29 mmol/L Final    Glucose 03/18/2025 107  70 - 110 mg/dL Final    BUN 03/18/2025 9  8 - 23 mg/dL Final    Creatinine 03/18/2025 0.8  0.5 - 1.4 mg/dL Final    Calcium 03/18/2025 8.4 (L)  8.7 - 10.5 mg/dL Final    Total Protein 03/18/2025 5.9 (L)  6.0 - 8.4 g/dL Final    Albumin 03/18/2025 2.6 (L)  3.5 - 5.2 g/dL Final    Total Bilirubin 03/18/2025 0.6  0.1 - 1.0 mg/dL Final    Comment: For infants and newborns, interpretation of results should  be based  on gestational age, weight and in agreement with clinical  observations.    Premature Infant recommended reference ranges:  Up to 24 hours.............<8.0 mg/dL  Up to 48 hours............<12.0 mg/dL  3-5 days..................<15.0 mg/dL  6-29 days.................<15.0 mg/dL      Alkaline Phosphatase 03/18/2025 338 (H)  40 - 150 U/L Final    AST 03/18/2025 18  10 - 40 U/L Final    ALT 03/18/2025 8 (L)  10 - 44 U/L Final    eGFR 03/18/2025 >60.0  >60 mL/min/1.73 m^2 Final    Anion Gap 03/18/2025 13  8 - 16 mmol/L Final    WBC 03/18/2025 23.97 (H)  3.90 - 12.70 K/uL Final    RBC 03/18/2025 3.41 (L)  4.60 - 6.20 M/uL Final    Hemoglobin 03/18/2025 9.5 (L)  14.0 - 18.0 g/dL Final    Hematocrit 03/18/2025 33.2 (L)  40.0 - 54.0 % Final    MCV 03/18/2025 97  82 - 98 fL Final    MCH 03/18/2025 27.9  27.0 - 31.0 pg Final    MCHC 03/18/2025 28.6 (L)  32.0 - 36.0 g/dL Final    RDW 03/18/2025 19.6 (H)  11.5 - 14.5 % Final    Platelets 03/18/2025 259  150 - 450 K/uL Final    MPV 03/18/2025 11.1  9.2 - 12.9 fL Final    Gran # (ANC) 03/18/2025 18.5 (H)  1.8 - 7.7 K/uL Final    Comment: The ANC is based on a white cell differential from an   automated cell counter. It has not been microscopically   reviewed for the presence of abnormal cells. Clinical   correlation is required.      Immature Grans (Abs) 03/18/2025 0.79 (H)  0.00 - 0.04 K/uL Final    Comment: Mild elevation in immature granulocytes is non specific and   can be seen in a variety of conditions including stress response,   acute inflammation, trauma and pregnancy. Correlation with other   laboratory and clinical findings is essential.       Assessment and Plan        1. Metastatic colon cancer to liver    2. Immunodeficiency secondary to neoplasm    3. Immunodeficiency due to chemotherapy    4. Chemotherapy induced neutropenia    5. Lower GI bleed    6. Anemia associated with chemotherapy    7. Weight decrease    8. Drug-induced constipation    9. Severe  malnutrition    10. Neoplasm related pain    11. Hypotension, unspecified hypotension type    12. Lower urinary tract symptoms (LUTS)    13. Dehydration    14. Liver masses            1-6.  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  Guardant 360 was negative for BRAF, VIRI, HER2 amplification.   Pretreatment CEA 57.  We had a long and sonia discussion with him about his diagnosis. Unfortunately, the disease is not curable but remains treatable and he has good performance status.   Restaging scans after 7 cycles of FOLFOX + Pema showed significant reduction in colonic mass and liver mass.   we switched to maintenance as of cycle 8 with 5FU + Pema  Restaging scans from March 2022 show stable disease.   MRI on 7/18/22 showing hepatic progression of disease in the right hepatic lobe noted on the exam. CT CAP on 8/26 shows some mild progression in both liver metastases.    Discussed case at colorectal tumor board 8/31/22 and had a diagnostic lap performed by Dr. Rodriguez on 9/7 to assess for any occult peritoneal disease. Findings from the diagnostic lap were negative. Fluid from around from around the primary mass was sent for cytology that was negative for malignancy.   Underwent primary tumor resection on 10/20/22 with Dr. Bonilla.    Meanwhile his liver mets had grown.  We discussed his case at CRC conference with plans for short term Y-90 and then restarting chemotherapy prior to considering any surgical options to his liver metastases.  He underwent Y-90 delivery on 12/30/22. Tolerated well.   CT CAP on 1/23/23 reviewed at [facility] conference with good response to Y-90, no new lesions.  Underwent second Y-90 on 1/27/23. Can consider R hepatectomy pending follow-up imaging after Y-90.     Received cycle 42 of FOLFOX (omitted oxaliplatin again starting cycle 41 due to neuropathy) on 2/9/23.  Because of 5-FU shortage nationally, we have been holding chemotherapy since mid-February 2023.   If he needs to restart chemotherapy (I.e. no plans for surgical resection), will place him on capecitabine maintenance for duration of 5-FU shortage.     Discussed at colorectal liver mets tumor board 3/16/23.  Plan for repeat Y-90 and then consideration of surgical resection and he will meet with liver transplant team as well.  Underwent Y-90 delivery 5/17/23 with IR.     Has been on maintenance Xeloda since late April 2023.    Met with liver transplant team but ultimately opted not to proceed with transplant as he was concerned about how he would tolerate a major surgery with the life changes that would come after transplant as well. They closed out his case.    He met with Dr. Rodriguez to discuss whether surgical resection is indicated for his liver mets.  He recommended repeat MRI.  Repeat MRI unfortunately showed disease progression while on Xeloda maintenance.  Similarly his CEA garrett precipitously, all in keeping with worsening disease.    We recommended we restart IV chemotherapy with FOLFIRI + Avastin (never had irinotecan).    Because of hyperbilirubinemia he received cycle 1 with just 5-FU + Avastin on 7/11/23. Tolerated this well. Bilirubin has improved.  Received irinotecan starting with cycle 2.  Repeat CT CAP after cycle 4 shows good response to therapy.   Excellent tolerance. mCRC tumor board agrees with continuation of chemotherapy.   Repeat CT CAP after cycle 7 shows stable disease, no evidence of new or worsening disease.   Repeat CT CAP after cycle 11 shows stable disease.   Repeat CT CAP after cycle 15 shows improved disease.  Repeat CT CAP after cycle 21 shows stable disease.   Repeat CT CAP after cycle 24 shows stable disease.    Hospitalized after cycle 26 and 27 (without Avastin) for hematochezia.  Colonoscopy with likely diverticular bleed.  Required 2 units of blood in last month.  Will continue to hold bevacizumab indefinitely.  Discussed that we don't have a very good way preventing this  recurrence and chemo with its associated thrombocytopenia may make this more likely to occur again.  He still wishes to proceed, which is my recommendation as well.    Repeat CT CAP after cycle 28 shows stable disease and recc to continue with chemotherapy.   Repeat CT CAP after cycle 32 shows progression of R liver lobe metastasis with rising CEA.  Will plan for Y-90 to growing lesion.  Underwent mapping 11/5/24 and delivery 11/21/24.  CT CAP 12/20/24 shows otherwise stable disease.    Underwent L lobe MWA per Dr. Vidal on 2/10/25.     Will continue FOLFIRI in the meantime.    Delayed cycle 33 due to neutropenia - has since recovered and on Neulasta.  CT CAP after cycle 40 shows disease progression in liver.  He will be seeing Dr. Vidal for potential Y90 though unclear if his liver can tolerate more.    I discussed switching systemic therapy.  Offered recycle of FOLFOX vs Lonsurf vs best supportive care.  He has opted for Lonsurf.      Tempus xT: APC, CKS1B cng, ERCC3 cnl, PIK3CA; KENIA, TMB 12.1.  Tempus xF: APC, KRAS Q61H, PIK3CA, TP53; KENIA    7-10. Neoplasm related pain, weight loss, constipation, malnutrition  -- Pain well controlled overall. Seldom takes Percocet.  Will refill.  -- Following with nutrition. Encouraged increased protein. Monitor.  Weight stable.  -- Continue Miralax as needed for constipation. Doing well with this.     11. Hypotension   -- BP normal today.   -- Following with PCP.   -- Encouraged him and his daughter to monitor BP and HR closely at home.     12-13. Urinary Hesitancy/dehydration  -- Continue Flomax.   -- Reported improvement since starting medication.     RTC in 2 weeks    Patient is in agreement with the proposed treatment plan. All questions were answered to the patient's satisfaction. Pt knows to call clinic if anything is needed before the next clinic visit.     code applied: patient requires or will require a continuous, longitudinal, and active collaborative plan of  care related to this patient's health condition, colon cancer --the management of which requires the direction of a practitioner with specialized clinical knowledge, skill, and expertise.     Bunny Schuster MD  Hematology/Oncology  [facility]      Route Chart for Scheduling    Med Onc Chart Routing      Follow up with physician 6 weeks. for tox check with lasb (no chemo)   Follow up with RACHEL 4 weeks. for tox check with lasb (no chemo)   Infusion scheduling note    Injection scheduling note    Labs CBC, CMP and CEA   Scheduling:  Preferred lab:  Lab interval:     Imaging    Pharmacy appointment    Other referrals

## 2025-03-25 ENCOUNTER — LAB VISIT (OUTPATIENT)
Dept: LAB | Facility: HOSPITAL | Age: 75
End: 2025-03-25
Payer: MEDICARE

## 2025-03-25 DIAGNOSIS — R16.0 LIVER MASSES: ICD-10-CM

## 2025-03-25 LAB
ABSOLUTE EOSINOPHIL (OHS): 0.08 K/UL
ABSOLUTE MONOCYTE (OHS): 1.36 K/UL (ref 0.3–1)
ABSOLUTE NEUTROPHIL COUNT (OHS): 9.04 K/UL (ref 1.8–7.7)
ALBUMIN SERPL BCP-MCNC: 2.6 G/DL (ref 3.5–5.2)
ALP SERPL-CCNC: 267 UNIT/L (ref 40–150)
ALT SERPL W/O P-5'-P-CCNC: 8 UNIT/L (ref 10–44)
ANION GAP (OHS): 10 MMOL/L (ref 8–16)
AST SERPL-CCNC: 22 UNIT/L (ref 11–45)
BASOPHILS # BLD AUTO: 0.09 K/UL
BASOPHILS NFR BLD AUTO: 0.8 %
BILIRUB DIRECT SERPL-MCNC: 0.6 MG/DL (ref 0.1–0.3)
BILIRUB SERPL-MCNC: 1.3 MG/DL (ref 0.1–1)
BUN SERPL-MCNC: 8 MG/DL (ref 8–23)
CALCIUM SERPL-MCNC: 8.7 MG/DL (ref 8.7–10.5)
CHLORIDE SERPL-SCNC: 104 MMOL/L (ref 95–110)
CO2 SERPL-SCNC: 25 MMOL/L (ref 23–29)
CREAT SERPL-MCNC: 0.7 MG/DL (ref 0.5–1.4)
ERYTHROCYTE [DISTWIDTH] IN BLOOD BY AUTOMATED COUNT: 18.6 % (ref 11.5–14.5)
GFR SERPLBLD CREATININE-BSD FMLA CKD-EPI: >60 ML/MIN/1.73/M2
GLUCOSE SERPL-MCNC: 148 MG/DL (ref 70–110)
HCT VFR BLD AUTO: 35.4 % (ref 40–54)
HGB BLD-MCNC: 10.5 GM/DL (ref 14–18)
IMM GRANULOCYTES # BLD AUTO: 0.1 K/UL (ref 0–0.04)
IMM GRANULOCYTES NFR BLD AUTO: 0.9 % (ref 0–0.5)
INR PPP: 1.2 (ref 0.8–1.2)
LYMPHOCYTES # BLD AUTO: 0.76 K/UL (ref 1–4.8)
MCH RBC QN AUTO: 28.2 PG (ref 27–50)
MCHC RBC AUTO-ENTMCNC: 29.7 G/DL (ref 32–36)
MCV RBC AUTO: 95 FL (ref 82–98)
NUCLEATED RBC (/100WBC) (OHS): 0 /100 WBC
PLATELET # BLD AUTO: 179 K/UL (ref 150–450)
PMV BLD AUTO: 10.9 FL (ref 9.2–12.9)
POTASSIUM SERPL-SCNC: 3.6 MMOL/L (ref 3.5–5.1)
PROT SERPL-MCNC: 6.2 GM/DL (ref 6–8.4)
PROTHROMBIN TIME: 12.9 SECONDS (ref 9–12.5)
RBC # BLD AUTO: 3.72 M/UL (ref 4.6–6.2)
RELATIVE EOSINOPHIL (OHS): 0.7 %
RELATIVE LYMPHOCYTE (OHS): 6.6 % (ref 18–48)
RELATIVE MONOCYTE (OHS): 11.9 % (ref 4–15)
RELATIVE NEUTROPHIL (OHS): 79.1 % (ref 38–73)
SODIUM SERPL-SCNC: 139 MMOL/L (ref 136–145)
WBC # BLD AUTO: 11.43 K/UL (ref 3.9–12.7)

## 2025-03-25 PROCEDURE — 84075 ASSAY ALKALINE PHOSPHATASE: CPT | Mod: HCNC

## 2025-03-25 PROCEDURE — 85610 PROTHROMBIN TIME: CPT | Mod: HCNC

## 2025-03-25 PROCEDURE — 82248 BILIRUBIN DIRECT: CPT | Mod: HCNC

## 2025-03-25 PROCEDURE — 36415 COLL VENOUS BLD VENIPUNCTURE: CPT | Mod: HCNC

## 2025-03-25 PROCEDURE — 85025 COMPLETE CBC W/AUTO DIFF WBC: CPT | Mod: HCNC

## 2025-03-26 ENCOUNTER — HOSPITAL ENCOUNTER (OUTPATIENT)
Dept: RADIOLOGY | Facility: HOSPITAL | Age: 75
Discharge: HOME | End: 2025-03-26
Attending: PHYSICIAN ASSISTANT
Payer: MEDICARE

## 2025-03-26 ENCOUNTER — ANESTHESIA (OUTPATIENT)
Dept: INTERVENTIONAL RADIOLOGY/VASCULAR | Facility: HOSPITAL | Age: 75
End: 2025-03-26
Payer: MEDICARE

## 2025-03-26 ENCOUNTER — HOSPITAL ENCOUNTER (OUTPATIENT)
Dept: INTERVENTIONAL RADIOLOGY/VASCULAR | Facility: HOSPITAL | Age: 75
Discharge: HOME | End: 2025-03-26
Attending: PHYSICIAN ASSISTANT | Admitting: RADIOLOGY
Payer: MEDICARE

## 2025-03-26 VITALS
DIASTOLIC BLOOD PRESSURE: 78 MMHG | OXYGEN SATURATION: 100 % | BODY MASS INDEX: 16.33 KG/M2 | HEART RATE: 87 BPM | HEIGHT: 67 IN | TEMPERATURE: 98 F | WEIGHT: 104.06 LBS | RESPIRATION RATE: 15 BRPM | SYSTOLIC BLOOD PRESSURE: 128 MMHG

## 2025-03-26 DIAGNOSIS — R16.0 LIVER MASSES: ICD-10-CM

## 2025-03-26 PROCEDURE — 78201 LIVER IMAGING STATIC ONLY: CPT | Mod: TC,HCNC

## 2025-03-26 PROCEDURE — 78830 RP LOCLZJ TUM SPECT W/CT 1: CPT | Mod: 26,HCNC,, | Performed by: NUCLEAR MEDICINE

## 2025-03-26 PROCEDURE — 78201 LIVER IMAGING STATIC ONLY: CPT | Mod: 26,HCNC,59, | Performed by: NUCLEAR MEDICINE

## 2025-03-26 PROCEDURE — A9540 TC99M MAA: HCPCS | Mod: HCNC | Performed by: PHYSICIAN ASSISTANT

## 2025-03-26 PROCEDURE — C1887 CATHETER, GUIDING: HCPCS | Mod: HCNC

## 2025-03-26 PROCEDURE — 37000009 HC ANESTHESIA EA ADD 15 MINS: Mod: HCNC

## 2025-03-26 PROCEDURE — 25000003 PHARM REV CODE 250: Mod: HCNC | Performed by: NURSE ANESTHETIST, CERTIFIED REGISTERED

## 2025-03-26 PROCEDURE — 25500020 PHARM REV CODE 255: Mod: HCNC | Performed by: RADIOLOGY

## 2025-03-26 PROCEDURE — 78830 RP LOCLZJ TUM SPECT W/CT 1: CPT | Mod: TC,HCNC

## 2025-03-26 PROCEDURE — C1894 INTRO/SHEATH, NON-LASER: HCPCS | Mod: HCNC

## 2025-03-26 PROCEDURE — 37000008 HC ANESTHESIA 1ST 15 MINUTES: Mod: HCNC

## 2025-03-26 PROCEDURE — 63600175 PHARM REV CODE 636 W HCPCS: Mod: HCNC | Performed by: NURSE ANESTHETIST, CERTIFIED REGISTERED

## 2025-03-26 PROCEDURE — C1769 GUIDE WIRE: HCPCS | Mod: HCNC

## 2025-03-26 RX ORDER — FENTANYL CITRATE 50 UG/ML
25 INJECTION, SOLUTION INTRAMUSCULAR; INTRAVENOUS EVERY 5 MIN PRN
Refills: 0 | OUTPATIENT
Start: 2025-03-26

## 2025-03-26 RX ORDER — ONDANSETRON HYDROCHLORIDE 2 MG/ML
INJECTION, SOLUTION INTRAVENOUS
Status: DISCONTINUED | OUTPATIENT
Start: 2025-03-26 | End: 2025-03-26

## 2025-03-26 RX ORDER — FENTANYL CITRATE 50 UG/ML
INJECTION, SOLUTION INTRAMUSCULAR; INTRAVENOUS
Status: DISCONTINUED | OUTPATIENT
Start: 2025-03-26 | End: 2025-03-26

## 2025-03-26 RX ORDER — ONDANSETRON HYDROCHLORIDE 2 MG/ML
4 INJECTION, SOLUTION INTRAVENOUS DAILY PRN
OUTPATIENT
Start: 2025-03-26

## 2025-03-26 RX ORDER — DEXMEDETOMIDINE HYDROCHLORIDE 100 UG/ML
INJECTION, SOLUTION INTRAVENOUS
Status: DISCONTINUED | OUTPATIENT
Start: 2025-03-26 | End: 2025-03-26

## 2025-03-26 RX ORDER — ROCURONIUM BROMIDE 10 MG/ML
INJECTION, SOLUTION INTRAVENOUS
Status: DISCONTINUED | OUTPATIENT
Start: 2025-03-26 | End: 2025-03-26

## 2025-03-26 RX ORDER — PROPOFOL 10 MG/ML
VIAL (ML) INTRAVENOUS
Status: DISCONTINUED | OUTPATIENT
Start: 2025-03-26 | End: 2025-03-26

## 2025-03-26 RX ORDER — PHENYLEPHRINE HYDROCHLORIDE 10 MG/ML
INJECTION INTRAVENOUS
Status: DISCONTINUED | OUTPATIENT
Start: 2025-03-26 | End: 2025-03-26

## 2025-03-26 RX ORDER — LIDOCAINE HYDROCHLORIDE 20 MG/ML
INJECTION, SOLUTION EPIDURAL; INFILTRATION; INTRACAUDAL; PERINEURAL
Status: DISCONTINUED | OUTPATIENT
Start: 2025-03-26 | End: 2025-03-26

## 2025-03-26 RX ORDER — SODIUM CHLORIDE 0.9 % (FLUSH) 0.9 %
10 SYRINGE (ML) INJECTION
OUTPATIENT
Start: 2025-03-26

## 2025-03-26 RX ORDER — GLUCAGON 1 MG
1 KIT INJECTION
OUTPATIENT
Start: 2025-03-26

## 2025-03-26 RX ORDER — SODIUM CHLORIDE 9 MG/ML
INJECTION, SOLUTION INTRAVENOUS CONTINUOUS
Status: DISCONTINUED | OUTPATIENT
Start: 2025-03-26 | End: 2025-03-27 | Stop reason: HOSPADM

## 2025-03-26 RX ORDER — LIDOCAINE HYDROCHLORIDE 10 MG/ML
1 INJECTION, SOLUTION EPIDURAL; INFILTRATION; INTRACAUDAL; PERINEURAL ONCE
Status: DISCONTINUED | OUTPATIENT
Start: 2025-03-26 | End: 2025-03-27 | Stop reason: HOSPADM

## 2025-03-26 RX ORDER — ONDANSETRON HYDROCHLORIDE 2 MG/ML
4 INJECTION, SOLUTION INTRAVENOUS EVERY 6 HOURS PRN
Status: DISCONTINUED | OUTPATIENT
Start: 2025-03-26 | End: 2025-03-27 | Stop reason: HOSPADM

## 2025-03-26 RX ORDER — DEXAMETHASONE SODIUM PHOSPHATE 4 MG/ML
INJECTION, SOLUTION INTRA-ARTICULAR; INTRALESIONAL; INTRAMUSCULAR; INTRAVENOUS; SOFT TISSUE
Status: DISCONTINUED | OUTPATIENT
Start: 2025-03-26 | End: 2025-03-26

## 2025-03-26 RX ADMIN — DEXMEDETOMIDINE 4 MCG: 200 INJECTION, SOLUTION INTRAVENOUS at 12:03

## 2025-03-26 RX ADMIN — SODIUM CHLORIDE: 0.9 INJECTION, SOLUTION INTRAVENOUS at 10:03

## 2025-03-26 RX ADMIN — PHENYLEPHRINE HYDROCHLORIDE 100 MCG: 10 INJECTION INTRAVENOUS at 11:03

## 2025-03-26 RX ADMIN — DEXAMETHASONE SODIUM PHOSPHATE 4 MG: 4 INJECTION, SOLUTION INTRAMUSCULAR; INTRAVENOUS at 11:03

## 2025-03-26 RX ADMIN — IOHEXOL 65 ML: 300 INJECTION, SOLUTION INTRAVENOUS at 12:03

## 2025-03-26 RX ADMIN — PHENYLEPHRINE HYDROCHLORIDE 50 MCG: 10 INJECTION INTRAVENOUS at 12:03

## 2025-03-26 RX ADMIN — KIT FOR THE PREPARATION OF TECHNETIUM TC 99M ALBUMIN AGGREGATED 4.82 MILLICURIE: 2 INJECTION, POWDER, LYOPHILIZED, FOR SUSPENSION INTRAPERITONEAL; INTRAVENOUS at 12:03

## 2025-03-26 RX ADMIN — LIDOCAINE HYDROCHLORIDE 100 MG: 20 INJECTION, SOLUTION EPIDURAL; INFILTRATION; INTRACAUDAL; PERINEURAL at 10:03

## 2025-03-26 RX ADMIN — FENTANYL CITRATE 100 MCG: 50 INJECTION, SOLUTION INTRAMUSCULAR; INTRAVENOUS at 10:03

## 2025-03-26 RX ADMIN — ROCURONIUM BROMIDE 10 MG: 10 INJECTION INTRAVENOUS at 11:03

## 2025-03-26 RX ADMIN — ROCURONIUM BROMIDE 50 MG: 10 INJECTION INTRAVENOUS at 10:03

## 2025-03-26 RX ADMIN — ONDANSETRON 4 MG: 2 INJECTION INTRAMUSCULAR; INTRAVENOUS at 12:03

## 2025-03-26 RX ADMIN — SUGAMMADEX 200 MG: 100 INJECTION, SOLUTION INTRAVENOUS at 12:03

## 2025-03-26 RX ADMIN — PROPOFOL 100 MG: 10 INJECTION, EMULSION INTRAVENOUS at 10:03

## 2025-03-26 NOTE — DISCHARGE INSTRUCTIONS
Y 90 Mapping Discharge Instructions    Monitor the groin site for bleeding. Apply firm pressure to the groin site if you need to cough, during sneezes, and in the event you have to vomit. This reduces the risk of your site bleeding. If bleeding does occurs, apply firm, manual pressure and seek medical assistance immediately!  Do not shower/bathe tonight. Shower tomorrow, at least 24 hours post-procedure. After your shower, you may remove the groin dressing.   Carefully cleanse the groin site with gentle soap and water; do not pick at any scab formation.  Monitor the groin site for signs and symptoms of infection (See below).  You will hear from our clinic in the next 4-6 weeks to schedule your treatment date. It will be very similar to today's procedure including lying flat for 2-4 hours afterwards.    Recovering at Home:    Follow your doctor's recommendations on when it is safe to drive - at least THREE days after your procedure.  Do not lift anything heavier than a gallon of milk for the next TEN days.  Avoid strenuous activity/exercise for the next TEN days - this includes climbing stairs.   Do not submerge in a bathtub, pool, or Jacuzzi until the groin site is fully closed - at least 14 days.  Rest often. You may be more tired than usual.  Take your medications exactly as directed. Do not skip doses. Note - some medications should not be resumed after this procedure to prevent any adverse interaction with the contrast dye, which may be harmful to your kidneys. Be sure to ask your healthcare team about any potential reactions and for guidance on what medications to hold.  Unless directed otherwise, drink six to eight glasses of water a day for the next TWO days to prevent dehydration and to help flush your body of the contrast dye used during your procedure.  Take your temperature and check the groin site for signs of infection - redness, swelling, drainage, or warmth - every day for one week.    When to call  your doctor:    Fever of 100.4°F (38°C) or higher, or as directed by your health care provider.  Sudden shortness breath or any bleeding, bruising, or a large area of swelling at the groin site.  Signs of an allergic reaction to the contrast dye: rash, nausea, vomiting, or trouble breathing.  Signs of infection at the groin site: increased pain, redness, swelling, warmth, or foul-smelling drainage.   Constant or increasing pain or numbness in your leg.  Your leg feels cold, looks blue; numbness and/or tingling in the leg, especially near the catheter insertion site.   Rapid, pounding, or irregular heartbeat.  Blood in your urine or black, tarry stools.    Interventional Radiology Clinic    [phone], choose prompt 3, Monday - Friday, 8:00 am - 4:00 pm    [phone] After hours and on holidays. Ask to speak with the interventional radiologist on call.

## 2025-03-26 NOTE — TRANSFER OF CARE
"Anesthesia Transfer of Care Note    Patient: Javier Downs    Procedure(s) Performed: * No procedures listed *    Patient location: [facility]    Anesthesia Type: general    Transport from OR: Transported from OR on room air with adequate spontaneous ventilation    Post pain: adequate analgesia    Post assessment: no apparent anesthetic complications and tolerated procedure well    Post vital signs: stable    Level of consciousness: awake, alert and oriented    Nausea/Vomiting: no nausea/vomiting    Complications: none    Transfer of care protocol was followed      Last vitals: Visit Vitals  /78 (BP Location: Left arm, Patient Position: Lying)   Pulse 93   Temp 36.8 °C (98.2 °F) (Temporal)   Resp 14   Ht 5' 7" (1.702 m)   Wt 47.2 kg (104 lb 0.9 oz)   SpO2 99%   BMI 16.30 kg/m²     "

## 2025-03-26 NOTE — CARE UPDATE
Care assumed in Nuclear med. See transfer of care communication and assessment in computer. Pt alert and oriented x4 and  flat in scanner.

## 2025-03-26 NOTE — PLAN OF CARE
Procedure completed. Patient tolerated well; VSS. Hemostasis achieved to right groin via manual compression at 1235; patient to remain flat until 1535. Site CDI without hematoma. Patient to be transported to [facility] accompanied by CRNA and RN; report to be given at the bedside.

## 2025-03-26 NOTE — PLAN OF CARE
Pt arrived to  for pre Y90 . Pt oriented to unit and staff, Pt safely transferred from stretcher to procedural table. Fall risk reviewed and comfort measures utilized with interventions. Safety strap applied, position pillows to minimize pressure points. Blankets applied. Pt prepped and draped utilizing standard sterile technique. Patient placed on continuous monitoring, as required by sedation policy. Timeouts implemented utilizing standard universal time-out per department and facility policy. CRNA to remain at bedside with continuous monitoring. Pt resting comfortably. Denies pain/discomfort. Will continue to monitor. See flow sheets for monitoring, medication administration, and updates. patient verbalizes understanding.

## 2025-03-26 NOTE — H&P
Radiology History & Physical      SUBJECTIVE:     Chief Complaint: liver metastasis    History of Present Illness:  Javier Downs is a 74 y.o. male with PMHx including colon cancer with metastasis to the liver who presents for pre-Y90 angiographic planning. He has undergone ablation and multiple Y-90 radioembolization procedures in the past, most recently 24.    Past Medical History:   Diagnosis Date    MARIA A (acute kidney injury) 2022    Hypertension     Hypotension 2025    Metastatic colon cancer to liver 2021     Past Surgical History:   Procedure Laterality Date    COLONOSCOPY N/A 2021    Procedure: COLONOSCOPY with possible stent;  Surgeon: EDILMA Bonilla MD;  Location: [facility] ENDO (2ND FLR);  Service: Colon and Rectal;  Laterality: N/A;    COLONOSCOPY N/A 11/3/2023    Procedure: COLONOSCOPY;  Surgeon: EDILMA Bonilla MD;  Location: [facility] (4TH FLR);  Service: Endoscopy;  Laterality: N/A;  prep instructions sent to pt via portal  From: EDILMA Bonilla MD  Procedure: Colonoscopy  Diagnosis: Surveillance colonoscopy - Hx of colon cancer  Procedure Timin-12 weeks  Provider: Myself  Location: [address]  Additional Scheduling Information: No scheduling con    COLONOSCOPY N/A 7/10/2024    Procedure: COLONOSCOPY;  Surgeon: Tam Pantoja MD;  Location: [facility] ENDO (2ND FLR);  Service: Endoscopy;  Laterality: N/A;    DIAGNOSTIC LAPAROSCOPY N/A 2022    Procedure: LAPAROSCOPY, DIAGNOSTIC;  Surgeon: Adrian Rodriguez MD;  [facility];  Service: General;  Laterality: N/A;    INSERTION OF TUNNELED CENTRAL VENOUS CATHETER (CVC) WITH SUBCUTANEOUS PORT N/A 5/3/2021    Procedure: [id number]-[id number]-I-CATH;  Surgeon: Francisco Layton MD;  Location: [facility] OR 2ND FLR;  Service: Vascular;  Laterality: N/A;    OMENTECTOMY N/A 10/20/2022    Procedure: OMENTECTOMY;  Surgeon: EDILMA Bonilla MD;  [facility];  Service: Colon and Rectal;  Laterality: N/A;    RIGHT  HEMICOLECTOMY N/A 10/20/2022    Procedure: HEMICOLECTOMY, RIGHT, extended;  Surgeon: EDILMA Bonilla MD;  Location: [facility] OR [address] [place] [address];  Service: Colon and Rectal;  Laterality: N/A;  Extended right hemicolectomy CONSENT IN AM       Home Meds:   Prior to Admission medications    Medication Sig Start Date End Date Taking? Authorizing Provider   acetaminophen (TYLENOL) 500 MG tablet Take 1 tablet (500 mg total) by mouth every 6 (six) hours as needed for Pain (alternate with ibuprofen). 5/3/21   FRANKLIN Delarosa MD   amLODIPine (NORVASC) 10 MG tablet Take 1 tablet (10 mg total) by mouth once daily. 11/29/24  Yes Anali Hernandez PA-C   amoxicillin-clavulanate 875-125mg (AUGMENTIN) 875-125 mg per tablet Take 1 tablet by mouth 2 (two) times daily.  Patient not taking: No sig reported 2/10/25   Raul Vidal MD   LIDOcaine-prilocaine (EMLA) cream Apply topically as needed (port application). 4/15/24   Anali Hernandez PA-C   ondansetron (ZOFRAN) 4 MG tablet Take 1 tablet (4 mg total) by mouth every 8 (eight) hours as needed for Nausea. 6/8/23  Yes Thomas Monroe MD   ondansetron (ZOFRAN) 4 MG tablet Take 1 tablet (4 mg total) by mouth every 8 (eight) hours as needed for Nausea. 2/10/25  Yes Raul Vidal MD   OPW TEST CLAIM - DO NOT FILL OPW test claim. Do not fill. 2/10/25      oxyCODONE-acetaminophen (PERCOCET) 5-325 mg per tablet Take 1 tablet by mouth every 6 (six) hours as needed for Pain. 3/20/25  Yes Bunny Schuster MD   pantoprazole (PROTONIX) 40 MG tablet Take 1 tablet (40 mg total) by mouth 2 (two) times daily before meals. 6/26/24 6/26/25 Yes Max Grijalva MD   tamsulosin (FLOMAX) 0.4 mg Cap Take 1 capsule (0.4 mg total) by mouth once daily. 3/3/25 3/3/26 Yes Bunny Schuster MD   traMADoL (ULTRAM) 50 mg tablet Take 1 tablet (50 mg total) by mouth every 6 (six) hours as needed for Pain.  Patient not taking: Reported on 3/25/2025 9/16/24   Natividad Maher, CNS   trifluridine-tipiraciL  (LONSURF) 15-6.14 mg Tab Take 3 tablets (45 mg) by mouth twice daily on days 1 through 5 of each 14 day cycle. 3/20/25  Yes Bunny Schuster MD     Anticoagulants/Antiplatelets: no anticoagulation    Allergies: Review of patient's allergies indicates:  No Known Allergies  Sedation History:  no adverse reactions    Review of Systems:   Hematological: no known coagulopathies  Respiratory: no shortness of breath  Cardiovascular: no chest pain  Gastrointestinal: no abdominal pain  Genito-Urinary: no dysuria  Musculoskeletal: negative  Neurological: no TIA or stroke symptoms         OBJECTIVE:     Vital Signs (Most Recent)  Temp: 98.2 °F (36.8 °C) (03/26/25 0911)  Pulse: 93 (03/26/25 0911)  Resp: 14 (03/26/25 0911)  BP: 122/78 (03/26/25 0911)  SpO2: 99 % (03/26/25 0911)    Physical Exam:  ASA: class 2  Mallampati: per anesthesiology    General: no acute distress  Mental Status: alert and oriented to person, place and time  HEENT: normocephalic, atraumatic  Chest: unlabored breathing  Heart: regular heart rate  Abdomen: nondistended  Extremity: moves all extremities    Laboratory  Lab Results   Component Value Date    INR 1.2 03/25/2025       Lab Results   Component Value Date    WBC 11.43 03/25/2025    HGB 10.5 (L) 03/25/2025    HCT 35.4 (L) 03/25/2025    MCV 95 03/25/2025     03/25/2025      Lab Results   Component Value Date     03/18/2025     03/25/2025    K 3.6 03/25/2025     03/25/2025    CO2 25 03/25/2025    BUN 8 03/25/2025    CREATININE 0.7 03/25/2025    CALCIUM 8.7 03/25/2025    MG 1.7 12/21/2024    ALT 8 (L) 03/25/2025    AST 22 03/25/2025    ALBUMIN 2.6 (L) 03/25/2025    BILITOT 1.3 (H) 03/25/2025    BILIDIR 0.6 (H) 03/25/2025       ASSESSMENT/PLAN:     Sedation Plan: per anesthesiology    After thorough discussion with the patient regarding the nature of the procedure and its associated risks and benefits, he elected to proceed, and formal consent was obtained. All questions  were answered. Patient will undergo visceral angiography for pre-Y90 angiographic planning.    Familia Ashford MD PGY-2  Department of Radiology  [facility]

## 2025-03-26 NOTE — DISCHARGE SUMMARY
Radiology Discharge Summary      Hospital Course: No complications    Admit Date: 3/26/2025  Discharge Date: 03/26/2025     Instructions Given to Patient: Yes  Diet: Resume prior diet  Activity: activity as tolerated and no driving for today    Description of Condition on Discharge: Stable  Vital Signs (Most Recent): Temp: 98.2 °F (36.8 °C) (03/26/25 0911)  Pulse: 93 (03/26/25 0911)  Resp: 14 (03/26/25 0911)  BP: 122/78 (03/26/25 0911)  SpO2: 99 % (03/26/25 0911)    Discharge Disposition: Home    Discharge Diagnosis: mCRC s/p Y90 mapping    Follow up: As scheduled    Raul Vidal M.D.  Interventional Radiology  Department of Radiology

## 2025-03-26 NOTE — ANESTHESIA PREPROCEDURE EVALUATION
2025  Javier Downs is a 74 y.o., male Pre-operative evaluation for * No procedures listed *    Javier Downs is a 74 y.o. male     Problem List[1]    Review of patient's allergies indicates:  No Known Allergies    Medications Ordered Prior to Encounter[2]    Past Surgical History:   Procedure Laterality Date    COLONOSCOPY N/A 2021    Procedure: COLONOSCOPY with possible stent;  Surgeon: EDILMA Bonilla MD;  Location: [facility] ENDO (2ND FLR);  Service: Colon and Rectal;  Laterality: N/A;    COLONOSCOPY N/A 11/3/2023    Procedure: COLONOSCOPY;  Surgeon: EDILMA Bonilla MD;  Location: [facility] ENDO (4TH FLR);  Service: Endoscopy;  Laterality: N/A;  prep instructions sent to pt via portal  From: EDILMA Bonilla MD  Procedure: Colonoscopy  Diagnosis: Surveillance colonoscopy - Hx of colon cancer  Procedure Timin-12 weeks  Provider: Myself  Location: [facility] 4Endo  Additional Scheduling Information: No scheduling con    COLONOSCOPY N/A 7/10/2024    Procedure: COLONOSCOPY;  Surgeon: Tam Pantoja MD;  Location: [facility] ENDO (2ND FLR);  Service: Endoscopy;  Laterality: N/A;    DIAGNOSTIC LAPAROSCOPY N/A 2022    Procedure: LAPAROSCOPY, DIAGNOSTIC;  Surgeon: Adrian Rodriguez MD;  Location: [facility] OR 2ND FLR;  Service: General;  Laterality: N/A;    INSERTION OF TUNNELED CENTRAL VENOUS CATHETER (CVC) WITH SUBCUTANEOUS PORT N/A 5/3/2021    Procedure: [id number]-CATH;  Surgeon: Francisco Layton MD;  Location: [facility] OR 2ND FLR;  Service: Vascular;  Laterality: N/A;    OMENTECTOMY N/A 10/20/2022    Procedure: OMENTECTOMY;  Surgeon: EDILMA Bonilla MD;  Location: [facility] OR 2ND FLR;  Service: Colon and Rectal;  Laterality: N/A;    RIGHT HEMICOLECTOMY N/A 10/20/2022    Procedure: HEMICOLECTOMY, RIGHT, extended;  Surgeon: EDILMA Bonilla MD;  Location: [facility] OR 2ND FLR;  Service: Colon and Rectal;  Laterality: N/A;   Extended right hemicolectomy CONSENT IN AM       Social History[3]      CBC:   Recent Labs     03/25/25  1034   WBC 11.43   RBC 3.72*   HGB 10.5*   HCT 35.4*      MCV 95   MCH 28.2   MCHC 29.7*       CMP:   Recent Labs     03/25/25  1034      K 3.6      CO2 25   BUN 8   CREATININE 0.7   CALCIUM 8.7   ALBUMIN 2.6*   ALKPHOS 267*   ALT 8*   AST 22   BILITOT 1.3*       INR  Recent Labs     03/25/25  1034   INR 1.2     Pre-op Assessment    I have reviewed the Patient Summary Reports.     I have reviewed the Nursing Notes. I have reviewed the NPO Status.   I have reviewed the Medications.     Review of Systems  Anesthesia Hx:  No problems with previous Anesthesia   History of prior surgery of interest to airway management or planning:          Denies Family Hx of Anesthesia complications.    Denies Personal Hx of Anesthesia complications.                    Social:  No Alcohol Use, Non-Smoker       Hematology/Oncology:  Hematology Normal                     Current/Recent Cancer.         radiation       EENT/Dental:  EENT/Dental Normal           Cardiovascular:  Exercise tolerance: good   Hypertension                                          Pulmonary:  Pulmonary Normal                       Renal/:  Chronic Renal Disease                Hepatic/GI:      Liver Disease,               Neurological:  Neurology Normal                                      Endocrine:  Endocrine Normal            Psych:  Psychiatric Normal                    Physical Exam  General: Well nourished, Cooperative, Alert and Oriented    Airway:  Mallampati: III / II  Mouth Opening: Normal  TM Distance: Normal  Tongue: Normal  Neck ROM: Normal ROM    Dental:  Intact    Chest/Lungs:  Clear to auscultation, Normal Respiratory Rate    Heart:  Rate: Normal  Rhythm: Regular Rhythm        Anesthesia Plan  Type of Anesthesia, risks & benefits discussed:    Anesthesia Type: Gen ETT  Intra-op Monitoring Plan: Standard ASA Monitors  Post Op  Pain Control Plan: multimodal analgesia and IV/PO Opioids PRN  Induction:  IV  Airway Plan: Direct and Video, Post-Induction  Informed Consent: Informed consent signed with the Patient and all parties understand the risks and agree with anesthesia plan.  All questions answered. Patient consented to blood products? No  ASA Score: 3  Day of Surgery Review of History & Physical: H&P Update referred to the surgeon/provider.    Ready For Surgery From Anesthesia Perspective.     .           [1]   Patient Active Problem List  Diagnosis    Small bowel obstruction, partial    Colonic mass    Liver masses    Hypertension    Microcytic anemia    Thrombocytosis    Mesenteric vein thrombosis    Metastatic colon cancer to liver    MARIA A (acute kidney injury)    Hyperkalemia    Encounter for palliative care    Hypovolemia    Anemia, chronic disease    Pleural effusion    SIRS (systemic inflammatory response syndrome)    Hypotension   [2]   Current Outpatient Medications on File Prior to Encounter   Medication Sig Dispense Refill    amLODIPine (NORVASC) 10 MG tablet Take 1 tablet (10 mg total) by mouth once daily. 90 tablet 3    ondansetron (ZOFRAN) 4 MG tablet Take 1 tablet (4 mg total) by mouth every 8 (eight) hours as needed for Nausea. 30 tablet 3    ondansetron (ZOFRAN) 4 MG tablet Take 1 tablet (4 mg total) by mouth every 8 (eight) hours as needed for Nausea. 30 tablet 0    oxyCODONE-acetaminophen (PERCOCET) 5-325 mg per tablet Take 1 tablet by mouth every 6 (six) hours as needed for Pain. 60 tablet 0    pantoprazole (PROTONIX) 40 MG tablet Take 1 tablet (40 mg total) by mouth 2 (two) times daily before meals. 180 tablet 3    tamsulosin (FLOMAX) 0.4 mg Cap Take 1 capsule (0.4 mg total) by mouth once daily. 30 capsule 5    trifluridine-tipiraciL (LONSURF) 15-6.14 mg Tab Take 3 tablets (45 mg) by mouth twice daily on days 1 through 5 of each 14 day cycle. 60 tablet 5    acetaminophen (TYLENOL) 500 MG tablet Take 1 tablet (500 mg  total) by mouth every 6 (six) hours as needed for Pain (alternate with ibuprofen).  0    amoxicillin-clavulanate 875-125mg (AUGMENTIN) 875-125 mg per tablet Take 1 tablet by mouth 2 (two) times daily. (Patient not taking: No sig reported) 14 tablet 0    LIDOcaine-prilocaine (EMLA) cream Apply topically as needed (port application). 30 g 3    OPW TEST CLAIM - DO NOT FILL OPW test claim. Do not fill. 1 tablet 0    traMADoL (ULTRAM) 50 mg tablet Take 1 tablet (50 mg total) by mouth every 6 (six) hours as needed for Pain. (Patient not taking: Reported on 3/25/2025) 30 tablet 0     No current facility-administered medications on file prior to encounter.   [3]   Social History  Socioeconomic History    Marital status:    Tobacco Use    Smoking status: Never    Smokeless tobacco: Never   Substance and Sexual Activity    Alcohol use: Not Currently    Drug use: Never    Sexual activity: Not Currently     Partners: Female     Social Drivers of Health     Financial Resource Strain: Low Risk  (1/20/2025)    Overall Financial Resource Strain (CARDIA)     Difficulty of Paying Living Expenses: Not hard at all   Food Insecurity: No Food Insecurity (1/20/2025)    Hunger Vital Sign     Worried About Running Out of Food in the Last Year: Never true     Ran Out of Food in the Last Year: Never true   Transportation Needs: No Transportation Needs (12/20/2024)    TRANSPORTATION NEEDS     Transportation : No   Physical Activity: Insufficiently Active (1/20/2025)    Exercise Vital Sign     Days of Exercise per Week: 2 days     Minutes of Exercise per Session: 20 min   Stress: No Stress Concern Present (1/20/2025)    [employer] [place] [employer] - Occupational Stress Questionnaire     Feeling of Stress : Not at all   Housing Stability: Unknown (1/20/2025)    Housing Stability Vital Sign     Unable to Pay for Housing in the Last Year: No     Homeless in the Last Year: No

## 2025-03-26 NOTE — ANESTHESIA PROCEDURE NOTES
Intubation    Date/Time: 3/26/2025 10:57 AM    Performed by: Delmy Uriostegui  Authorized by: Maikel Hollingsworth MD    Intubation:     Induction:  Intravenous    Intubated:  Postinduction    Mask Ventilation:  Easy with oral airway    Attempts:  1    Attempted By:  CRNA    Method of Intubation:  Video laryngoscopy    Blade:  Stack 3    Laryngeal View Grade: Grade I - full view of cords      Difficult Airway Encountered?: No      Complications:  None    Airway Device:  Oral endotracheal tube    Airway Device Size:  7.5    Style/Cuff Inflation:  Cuffed (inflated to minimal occlusive pressure)    Inflation Amount (mL):  6    Tube secured:  22    Secured at:  The lips    Placement Verified By:  Capnometry    Complicating Factors:  None    Findings Post-Intubation:  BS equal bilateral and atraumatic/condition of teeth unchanged

## 2025-03-26 NOTE — PROCEDURES
Radiology Post-Procedure Note    Pre Op Diagnosis: mCRC    Post Op Diagnosis: Same    Procedure: Y90 mapping    Procedure performed by: Raul Vidal MD    Written Informed Consent Obtained: Yes  Specimen Removed: NO  Estimated Blood Loss: Minimal    Findings:   Via rt cfa, celiac, rlha, rha and branches selected and angiograms obtained. Tumor feeders identified. MAA to rlha. Vascade to rt cfa but did not take. Hemostasis with manual compression. No complications. See dictation.    Patient tolerated procedure well.    Raul Vidal M.D.  Interventional Radiology  Department of Radiology

## 2025-03-26 NOTE — NURSING
Procedure recovery complete. VSS. Procedure site dressing to right groin is clean, dry, and intact. Patient tolerated PO nutrition with no N/V, ambulated, and voided without difficulty. Patient verbalizes understanding of discharge instructions including when to call the doctor. PIV removed. Patient to be discharged home with daughter.

## 2025-03-26 NOTE — NURSING
Patient transported to MPU bay 6. Bedside report received from Tran LONDONO groin site is clean, dry, intact, soft with no signs of hematoma. Pedal pulses easily palpable. VSS. Daughter Carrie brought to bedside. Patient to remain supine with HOB flat until 1635.

## 2025-03-26 NOTE — NURSING
Pt prepped in room 9 on SSCU. Pt is AAOx4 and follows commands appropriately. VS taken and wnl and pt is free from s/s of intolerable pain/distress. IV started, mepilex applied to heals/bottom, and preop questions completed. Consents completed and verified. Safety measures in place. Pt's daughter is at bedside.

## 2025-03-27 NOTE — ANESTHESIA POSTPROCEDURE EVALUATION
Anesthesia Post Evaluation    Patient: Javier Downs    Procedure(s) Performed: * No procedures listed *    Final Anesthesia Type: general      Patient location during evaluation: PACU  Patient participation: Yes- Able to Participate  Level of consciousness: awake and alert and oriented  Post-procedure vital signs: reviewed and stable  Pain management: adequate  Airway patency: patent    PONV status at discharge: No PONV  Anesthetic complications: no      Cardiovascular status: blood pressure returned to baseline and hemodynamically stable  Respiratory status: unassisted, spontaneous ventilation and room air  Hydration status: euvolemic  Follow-up not needed.              Vitals Value Taken Time   /80 03/26/25 13:17   Temp 36.8 °C (98.2 °F) 03/26/25 12:42   Pulse 84 03/26/25 13:17   Resp 12 03/26/25 13:17   SpO2 100 % 03/26/25 13:17   Vitals shown include unfiled device data.      No case tracking events are documented in the log.      Pain/Jill Score: Jill Score: 10 (3/26/2025  2:18 PM)

## 2025-04-02 ENCOUNTER — LAB VISIT (OUTPATIENT)
Dept: LAB | Facility: HOSPITAL | Age: 75
End: 2025-04-02
Payer: MEDICARE

## 2025-04-02 ENCOUNTER — OFFICE VISIT (OUTPATIENT)
Dept: HEMATOLOGY/ONCOLOGY | Facility: CLINIC | Age: 75
End: 2025-04-02
Payer: MEDICARE

## 2025-04-02 VITALS
BODY MASS INDEX: 17.42 KG/M2 | SYSTOLIC BLOOD PRESSURE: 115 MMHG | WEIGHT: 111 LBS | HEIGHT: 67 IN | DIASTOLIC BLOOD PRESSURE: 66 MMHG | HEART RATE: 85 BPM | OXYGEN SATURATION: 100 %

## 2025-04-02 DIAGNOSIS — T45.1X5A CHEMOTHERAPY INDUCED NEUTROPENIA: ICD-10-CM

## 2025-04-02 DIAGNOSIS — G89.3 NEOPLASM RELATED PAIN: ICD-10-CM

## 2025-04-02 DIAGNOSIS — R39.9 LOWER URINARY TRACT SYMPTOMS (LUTS): ICD-10-CM

## 2025-04-02 DIAGNOSIS — C18.9 METASTATIC COLON CANCER TO LIVER: Primary | ICD-10-CM

## 2025-04-02 DIAGNOSIS — C78.7 METASTATIC COLON CANCER TO LIVER: Primary | ICD-10-CM

## 2025-04-02 DIAGNOSIS — T45.1X5A IMMUNODEFICIENCY DUE TO CHEMOTHERAPY: ICD-10-CM

## 2025-04-02 DIAGNOSIS — I95.9 HYPOTENSION, UNSPECIFIED HYPOTENSION TYPE: ICD-10-CM

## 2025-04-02 DIAGNOSIS — D84.821 IMMUNODEFICIENCY DUE TO CHEMOTHERAPY: ICD-10-CM

## 2025-04-02 DIAGNOSIS — K92.2 LOWER GI BLEED: ICD-10-CM

## 2025-04-02 DIAGNOSIS — C78.7 METASTATIC COLON CANCER TO LIVER: ICD-10-CM

## 2025-04-02 DIAGNOSIS — K59.03 DRUG-INDUCED CONSTIPATION: ICD-10-CM

## 2025-04-02 DIAGNOSIS — T45.1X5A ANEMIA ASSOCIATED WITH CHEMOTHERAPY: ICD-10-CM

## 2025-04-02 DIAGNOSIS — D49.9 IMMUNODEFICIENCY SECONDARY TO NEOPLASM: ICD-10-CM

## 2025-04-02 DIAGNOSIS — D70.1 CHEMOTHERAPY INDUCED NEUTROPENIA: ICD-10-CM

## 2025-04-02 DIAGNOSIS — D84.81 IMMUNODEFICIENCY SECONDARY TO NEOPLASM: ICD-10-CM

## 2025-04-02 DIAGNOSIS — N17.9 AKI (ACUTE KIDNEY INJURY): ICD-10-CM

## 2025-04-02 DIAGNOSIS — R63.4 WEIGHT DECREASE: ICD-10-CM

## 2025-04-02 DIAGNOSIS — Z79.69 IMMUNODEFICIENCY DUE TO CHEMOTHERAPY: ICD-10-CM

## 2025-04-02 DIAGNOSIS — D64.81 ANEMIA ASSOCIATED WITH CHEMOTHERAPY: ICD-10-CM

## 2025-04-02 DIAGNOSIS — C18.9 METASTATIC COLON CANCER TO LIVER: ICD-10-CM

## 2025-04-02 LAB
ABSOLUTE NEUTROPHIL COUNT (OHS): 3.18 K/UL (ref 1.8–7.7)
ALBUMIN SERPL BCP-MCNC: 2.5 G/DL (ref 3.5–5.2)
ALP SERPL-CCNC: 311 UNIT/L (ref 40–150)
ALT SERPL W/O P-5'-P-CCNC: 12 UNIT/L (ref 10–44)
ANION GAP (OHS): 7 MMOL/L (ref 8–16)
AST SERPL-CCNC: 27 UNIT/L (ref 11–45)
BILIRUB SERPL-MCNC: 1.2 MG/DL (ref 0.1–1)
BUN SERPL-MCNC: 8 MG/DL (ref 8–23)
CALCIUM SERPL-MCNC: 8.8 MG/DL (ref 8.7–10.5)
CARCINOEMBRYONIC ANTIGEN (OHS): 86.4 NG/ML
CHLORIDE SERPL-SCNC: 105 MMOL/L (ref 95–110)
CO2 SERPL-SCNC: 26 MMOL/L (ref 23–29)
CREAT SERPL-MCNC: 0.6 MG/DL (ref 0.5–1.4)
ERYTHROCYTE [DISTWIDTH] IN BLOOD BY AUTOMATED COUNT: 18.3 % (ref 11.5–14.5)
GFR SERPLBLD CREATININE-BSD FMLA CKD-EPI: >60 ML/MIN/1.73/M2
GLUCOSE SERPL-MCNC: 84 MG/DL (ref 70–110)
HCT VFR BLD AUTO: 30.1 % (ref 40–54)
HGB BLD-MCNC: 9.2 GM/DL (ref 14–18)
IMM GRANULOCYTES # BLD AUTO: 0.02 K/UL (ref 0–0.04)
MCH RBC QN AUTO: 28.8 PG (ref 27–31)
MCHC RBC AUTO-ENTMCNC: 30.6 G/DL (ref 32–36)
MCV RBC AUTO: 94 FL (ref 82–98)
PLATELET # BLD AUTO: 230 K/UL (ref 150–450)
PMV BLD AUTO: 10.8 FL (ref 9.2–12.9)
POTASSIUM SERPL-SCNC: 4.3 MMOL/L (ref 3.5–5.1)
PROT SERPL-MCNC: 6.1 GM/DL (ref 6–8.4)
RBC # BLD AUTO: 3.2 M/UL (ref 4.6–6.2)
SODIUM SERPL-SCNC: 138 MMOL/L (ref 136–145)
WBC # BLD AUTO: 5.43 K/UL (ref 3.9–12.7)

## 2025-04-02 PROCEDURE — 99215 OFFICE O/P EST HI 40 MIN: CPT | Mod: HCNC,S$GLB,, | Performed by: PHYSICIAN ASSISTANT

## 2025-04-02 PROCEDURE — 1160F RVW MEDS BY RX/DR IN RCRD: CPT | Mod: HCNC,CPTII,S$GLB, | Performed by: PHYSICIAN ASSISTANT

## 2025-04-02 PROCEDURE — G2211 COMPLEX E/M VISIT ADD ON: HCPCS | Mod: HCNC,S$GLB,, | Performed by: PHYSICIAN ASSISTANT

## 2025-04-02 PROCEDURE — 1126F AMNT PAIN NOTED NONE PRSNT: CPT | Mod: HCNC,CPTII,S$GLB, | Performed by: PHYSICIAN ASSISTANT

## 2025-04-02 PROCEDURE — 3074F SYST BP LT 130 MM HG: CPT | Mod: HCNC,CPTII,S$GLB, | Performed by: PHYSICIAN ASSISTANT

## 2025-04-02 PROCEDURE — 3008F BODY MASS INDEX DOCD: CPT | Mod: HCNC,CPTII,S$GLB, | Performed by: PHYSICIAN ASSISTANT

## 2025-04-02 PROCEDURE — 3078F DIAST BP <80 MM HG: CPT | Mod: HCNC,CPTII,S$GLB, | Performed by: PHYSICIAN ASSISTANT

## 2025-04-02 PROCEDURE — 36415 COLL VENOUS BLD VENIPUNCTURE: CPT | Mod: HCNC

## 2025-04-02 PROCEDURE — 1159F MED LIST DOCD IN RCRD: CPT | Mod: HCNC,CPTII,S$GLB, | Performed by: PHYSICIAN ASSISTANT

## 2025-04-02 PROCEDURE — 99999 PR PBB SHADOW E&M-EST. PATIENT-LVL III: CPT | Mod: PBBFAC,HCNC,, | Performed by: PHYSICIAN ASSISTANT

## 2025-04-02 PROCEDURE — 3288F FALL RISK ASSESSMENT DOCD: CPT | Mod: HCNC,CPTII,S$GLB, | Performed by: PHYSICIAN ASSISTANT

## 2025-04-02 PROCEDURE — 85027 COMPLETE CBC AUTOMATED: CPT | Mod: HCNC

## 2025-04-02 PROCEDURE — 1101F PT FALLS ASSESS-DOCD LE1/YR: CPT | Mod: HCNC,CPTII,S$GLB, | Performed by: PHYSICIAN ASSISTANT

## 2025-04-02 PROCEDURE — 80053 COMPREHEN METABOLIC PANEL: CPT | Mod: HCNC

## 2025-04-02 PROCEDURE — 82378 CARCINOEMBRYONIC ANTIGEN: CPT | Mod: HCNC

## 2025-04-02 NOTE — PROGRESS NOTES
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 Bonilla  12/30/2022: Y-90 to R liver  1/27/2023: Y-90 to R liver  5/17/2023: Y-90 to R liver  7/11/2023: C1 FOLFIRI + Pema  7/26/2023: C2 FOLFIRI + Pema  8/8/2023: C3 FOLFIRI + Pema  8/22/23: C4 FOLFIRI + Pema  Restaging CT CAP 9/1/23: Good response to chemo.  9/7/23: C5 FOLFIRI + Pema  ......................................  Restaging CT CAP 10/12/23: Good response to chemo  10/16/23: C8 FOLFIRI + Pema  .......................................  Restaging CT CAP 12/8/23: Good response to chemo  12/11/23: C12 FOLFIRI + Pema  .......................................  Restaging CT CAP 2/5/24: Good response to chemo  2/8/24: C16 FOLFIRI + Pema    Restaging CT CAP 4/26/24: Good response to chemo  Restaging CT CAP 6/6/24: Good response to chemo  Restaging CT CAP 8/1/24: Stable disease  Restaging CT CAP 10/11/24: Progression of R liver lobe mass.  Plan for Y-90 to this.  Restaging CT CAP 12/20/24: No new extrahepatic disease.  Plan for MWA to left lobe lesion.  Restaging CT CAP 3/18/25: Disease progression in liver.    Interval History:   He presents today with his daughter s/p cycle 40 of FOLFIRI (off Avastin due to GIB), now on single agent Lonsurf. We discussed switching systemic therapy.  Offered recycle of FOLFOX vs Lonsurf vs  best supportive care. He has opted for Lonsurf. He started on 3/24/2025 and has done well.   - Tolerating the treatment well. He is eating well and has a good appetite. No nausea or vomiting. No diarrhea and he continues to be active. No other concerns or complaints today He states his strength is good.  Does note some new lower back midline pain.  Has lost two pounds.    ECOG status is 1. Presents with his daughter today.    Past Medical History:   Past Medical History:   Diagnosis Date    MARIA A (acute kidney injury) 2022    Hypertension     Hypotension 2025    Metastatic colon cancer to liver 2021     Past Surgical HIstory:   Past Surgical History:   Procedure Laterality Date    COLONOSCOPY N/A 2021    Procedure: COLONOSCOPY with possible stent;  Surgeon: EDILMA Bonilla MD;  Location: [facility] (2ND FLR);  Service: Colon and Rectal;  Laterality: N/A;    COLONOSCOPY N/A 11/3/2023    Procedure: COLONOSCOPY;  Surgeon: EDILMA Bonilla MD;  Location: [facility] (4TH FLR);  Service: Endoscopy;  Laterality: N/A;  prep instructions sent to pt via portal  From: EDILMA Bonilla MD  Procedure: Colonoscopy  Diagnosis: Surveillance colonoscopy - Hx of colon cancer  Procedure Timin-12 weeks  Provider: Myself  Location: [address]  Additional Scheduling Information: No scheduling con    COLONOSCOPY N/A 7/10/2024    Procedure: COLONOSCOPY;  Surgeon: Tam Pantoja MD;  Location: [facility] (2ND FLR);  Service: Endoscopy;  Laterality: N/A;    DIAGNOSTIC LAPAROSCOPY N/A 2022    Procedure: LAPAROSCOPY, DIAGNOSTIC;  Surgeon: Adrian Rodriguez MD;  [facility];  Service: General;  Laterality: N/A;    INSERTION OF TUNNELED CENTRAL VENOUS CATHETER (CVC) WITH SUBCUTANEOUS PORT N/A 5/3/2021    Procedure: FZROMSTPB-BOVO-S-CATH;  Surgeon: Francisco Layton MD;  [facility];  Service: Vascular;  Laterality: N/A;    OMENTECTOMY N/A 10/20/2022    Procedure: OMENTECTOMY;  Surgeon: EDILMA Coleman  MD Chad;  Location: [facility] OR 2ND FLR;  Service: Colon and Rectal;  Laterality: N/A;    RIGHT HEMICOLECTOMY N/A 10/20/2022    Procedure: HEMICOLECTOMY, RIGHT, extended;  Surgeon: EDILMA Bonilla MD;  Location: [facility] OR 2ND FLR;  Service: Colon and Rectal;  Laterality: N/A;  Extended right hemicolectomy CONSENT IN AM     Family History:   Family History   Problem Relation Name Age of Onset    Cancer Brother         Social History:  reports that he has never smoked. He has never used smokeless tobacco. He reports that he does not currently use alcohol. He reports that he does not use drugs.    Allergies:  Review of patient's allergies indicates:  No Known Allergies    Medications:  Current Outpatient Medications   Medication Sig Dispense Refill    acetaminophen (TYLENOL) 500 MG tablet Take 1 tablet (500 mg total) by mouth every 6 (six) hours as needed for Pain (alternate with ibuprofen).  0    amLODIPine (NORVASC) 10 MG tablet Take 1 tablet (10 mg total) by mouth once daily. 90 tablet 3    LIDOcaine-prilocaine (EMLA) cream Apply topically as needed (port application). 30 g 3    ondansetron (ZOFRAN) 4 MG tablet Take 1 tablet (4 mg total) by mouth every 8 (eight) hours as needed for Nausea. 30 tablet 3    ondansetron (ZOFRAN) 4 MG tablet Take 1 tablet (4 mg total) by mouth every 8 (eight) hours as needed for Nausea. 30 tablet 0    OPW TEST CLAIM - DO NOT FILL OPW test claim. Do not fill. 1 tablet 0    oxyCODONE-acetaminophen (PERCOCET) 5-325 mg per tablet Take 1 tablet by mouth every 6 (six) hours as needed for Pain. 60 tablet 0    pantoprazole (PROTONIX) 40 MG tablet Take 1 tablet (40 mg total) by mouth 2 (two) times daily before meals. 180 tablet 3    tamsulosin (FLOMAX) 0.4 mg Cap Take 1 capsule (0.4 mg total) by mouth once daily. 30 capsule 5    trifluridine-tipiraciL (LONSURF) 15-6.14 mg Tab Take 3 tablets (45 mg) by mouth twice daily on days 1 through 5 of each 14 day cycle. 60 tablet 5    amoxicillin-clavulanate  "875-125mg (AUGMENTIN) 875-125 mg per tablet Take 1 tablet by mouth 2 (two) times daily. (Patient not taking: Reported on 4/2/2025) 14 tablet 0    traMADoL (ULTRAM) 50 mg tablet Take 1 tablet (50 mg total) by mouth every 6 (six) hours as needed for Pain. (Patient not taking: Reported on 4/2/2025) 30 tablet 0     No current facility-administered medications for this visit.     Review of Systems   Constitutional:  Positive for fatigue. Negative for activity change, appetite change, chills, diaphoresis, fever and unexpected weight change.   HENT:  Negative for voice change.    Eyes:  Negative for visual disturbance.   Respiratory:  Negative for cough.    Cardiovascular:  Negative for chest pain and leg swelling.   Gastrointestinal:  Negative for abdominal pain, blood in stool, constipation, diarrhea, nausea and vomiting.   Musculoskeletal:  Negative for arthralgias and back pain.   Skin:  Negative for wound.   Neurological:  Negative for dizziness, weakness and numbness.   Psychiatric/Behavioral:  Negative for confusion and sleep disturbance. The patient is not nervous/anxious.    All other systems reviewed and are negative.    ECOG Performance Status: 1     Objective:      Vitals:   Vitals:    04/02/25 1058   BP: 115/66   Patient Position: Sitting   Pulse: 85   SpO2: 100%   Weight: 50.4 kg (111 lb)   Height: 5' 7" (1.702 m)       Physical Exam  Vitals reviewed.   Constitutional:       General: He is not in acute distress.     Comments: Chronically ill appearing   HENT:      Head: Normocephalic and atraumatic.      Right Ear: External ear normal.      Left Ear: External ear normal.      Nose: Nose normal.      Mouth/Throat:      Pharynx: Oropharynx is clear.   Eyes:      General: No scleral icterus.     Extraocular Movements: Extraocular movements intact.      Conjunctiva/sclera: Conjunctivae normal.   Cardiovascular:      Rate and Rhythm: Normal rate and regular rhythm.      Pulses: Normal pulses.      Heart sounds: " Normal heart sounds. No murmur heard.  Pulmonary:      Effort: Pulmonary effort is normal. No respiratory distress.      Breath sounds: Normal breath sounds. No wheezing.   Chest:      Comments: Port to RCW, no signs of infection.  Abdominal:      General: Abdomen is flat. Bowel sounds are normal. There is distension (mild).      Palpations: Abdomen is soft. There is no mass.      Tenderness: There is no abdominal tenderness.      Comments: Vertical midline abdominal wound well healed   Musculoskeletal:         General: No swelling or deformity. Normal range of motion.      Right lower leg: No edema.      Left lower leg: No edema.   Skin:     Coloration: Skin is not jaundiced or pale.      Findings: No bruising, erythema or rash.   Neurological:      General: No focal deficit present.      Mental Status: He is alert and oriented to person, place, and time. Mental status is at baseline.      Cranial Nerves: No cranial nerve deficit.      Sensory: No sensory deficit.      Gait: Gait normal.   Psychiatric:         Mood and Affect: Mood normal.         Behavior: Behavior normal.         Thought Content: Thought content normal.         Judgment: Judgment normal.     Laboratory Data:  Lab Visit on 04/02/2025   Component Date Value Ref Range Status    WBC 04/02/2025 5.43  3.90 - 12.70 K/uL Final    RBC 04/02/2025 3.20 (L)  4.60 - 6.20 M/uL Final    HGB 04/02/2025 9.2 (L)  14.0 - 18.0 gm/dL Final    HCT 04/02/2025 30.1 (L)  40.0 - 54.0 % Final    MCV 04/02/2025 94  82 - 98 fL Final    MCH 04/02/2025 28.8  27.0 - 31.0 pg Final    MCHC 04/02/2025 30.6 (L)  32.0 - 36.0 g/dL Final    RDW 04/02/2025 18.3 (H)  11.5 - 14.5 % Final    Platelet Count 04/02/2025 230  150 - 450 K/uL Final    MPV 04/02/2025 10.8  9.2 - 12.9 fL Final    Neut # 04/02/2025 3.18  1.8 - 7.7 K/uL Final    Imm Grans # 04/02/2025 0.02  0.00 - 0.04 K/uL Final    Mild elevation in immature granulocytes is non specific and can be seen in a variety of conditions  including stress response, acute inflammation, trauma and pregnancy. Correlation with other laboratory and clinical findings is essential.     Assessment and Plan        1. Metastatic colon cancer to liver    2. Immunodeficiency secondary to neoplasm    3. Immunodeficiency due to chemotherapy    4. Hypotension, unspecified hypotension type    5. Neoplasm related pain    6. Weight decrease    7. Drug-induced constipation    8. Anemia associated with chemotherapy    9. Lower GI bleed    10. Chemotherapy induced neutropenia              1-6.  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  Guardant 360 was negative for BRAF, VIRI, HER2 amplification.   Pretreatment CEA 57.  We had a long and sonia discussion with him about his diagnosis. Unfortunately, the disease is not curable but remains treatable and he has good performance status.   Restaging scans after 7 cycles of FOLFOX + Pema showed significant reduction in colonic mass and liver mass.   we switched to maintenance as of cycle 8 with 5FU + Pema  Restaging scans from March 2022 show stable disease.   MRI on 7/18/22 showing hepatic progression of disease in the right hepatic lobe noted on the exam. CT CAP on 8/26 shows some mild progression in both liver metastases.    Discussed case at colorectal tumor board 8/31/22 and had a diagnostic lap performed by Dr. Rodriguez on 9/7 to assess for any occult peritoneal disease. Findings from the diagnostic lap were negative. Fluid from around from around the primary mass was sent for cytology that was negative for malignancy.   Underwent primary tumor resection on 10/20/22 with Dr. Bonilla.    Meanwhile his liver mets had grown.  We discussed his case at [facility] conference with plans for short term Y-90 and then restarting chemotherapy prior to considering any surgical options to his liver metastases.  He underwent Y-90 delivery on 12/30/22. Tolerated well.   CT CAP on 1/23/23 reviewed at [facility]  conference with good response to Y-90, no new lesions.  Underwent second Y-90 on 1/27/23. Can consider R hepatectomy pending follow-up imaging after Y-90.     Received cycle 42 of FOLFOX (omitted oxaliplatin again starting cycle 41 due to neuropathy) on 2/9/23.  Because of 5-FU shortage nationally, we have been holding chemotherapy since mid-February 2023.  If he needs to restart chemotherapy (I.e. no plans for surgical resection), will place him on capecitabine maintenance for duration of 5-FU shortage.     Discussed at colorectal liver mets tumor board 3/16/23.  Plan for repeat Y-90 and then consideration of surgical resection and he will meet with liver transplant team as well.  Underwent Y-90 delivery 5/17/23 with IR.     Has been on maintenance Xeloda since late April 2023.    Met with liver transplant team but ultimately opted not to proceed with transplant as he was concerned about how he would tolerate a major surgery with the life changes that would come after transplant as well. They closed out his case.    He met with Dr. Rodriguez to discuss whether surgical resection is indicated for his liver mets.  He recommended repeat MRI.  Repeat MRI unfortunately showed disease progression while on Xeloda maintenance.  Similarly his CEA garrett precipitously, all in keeping with worsening disease.    We recommended we restart IV chemotherapy with FOLFIRI + Avastin (never had irinotecan).    Because of hyperbilirubinemia he received cycle 1 with just 5-FU + Avastin on 7/11/23. Tolerated this well. Bilirubin has improved.  Received irinotecan starting with cycle 2.  Repeat CT CAP after cycle 4 shows good response to therapy.   Excellent tolerance. [facility] tumor board agrees with continuation of chemotherapy.   Repeat CT CAP after cycle 7 shows stable disease, no evidence of new or worsening disease.   Repeat CT CAP after cycle 11 shows stable disease.   Repeat CT CAP after cycle 15 shows improved disease.  Repeat CT CAP  after cycle 21 shows stable disease.   Repeat CT CAP after cycle 24 shows stable disease.    Hospitalized after cycle 26 and 27 (without Avastin) for hematochezia.  Colonoscopy with likely diverticular bleed.  Required 2 units of blood in last month.  Will continue to hold bevacizumab indefinitely.  Discussed that we don't have a very good way preventing this recurrence and chemo with its associated thrombocytopenia may make this more likely to occur again.  He still wishes to proceed, which is my recommendation as well.    Repeat CT CAP after cycle 28 shows stable disease and recc to continue with chemotherapy.   Repeat CT CAP after cycle 32 shows progression of R liver lobe metastasis with rising CEA.  Will plan for Y-90 to growing lesion.  Underwent mapping 11/5/24 and delivery 11/21/24.  CT CAP 12/20/24 shows otherwise stable disease.    Underwent L lobe MWA per Dr. Vidal on 2/10/25.     Will continue FOLFIRI in the meantime.    Delayed cycle 33 due to neutropenia - has since recovered and on Neulasta.  CT CAP after cycle 40 shows disease progression in liver.  He will be seeing Dr. Vidal for potential Y90 though unclear if his liver can tolerate more.    we discussed switching systemic therapy.  Offered recycle of FOLFOX vs Lonsurf vs best supportive care.  He has opted for Lonsurf. He started on 3/24/2025 and has done well.   - Tolerating the treatment well. He is eating well and has a good appetite. No nausea or vomiting. No diarrhea and he continues to be active. No other concerns or complaints today   - I have personally reviewed the patient's lab work today, including CBC and CMP. ANC is adequate for treatment   - Proceed with Lonsurf.    RTC in 2 weeks for toxicity check    Tempus xT: APC, CKS1B cng, ERCC3 cnl, PIK3CA; KENIA, TMB 12.1.  Tempus xF: APC, KRAS Q61H, PIK3CA, TP53; KENIA    7-10. Neoplasm related pain, weight loss, constipation, malnutrition  -- Pain well controlled overall. Seldom takes  Percocet.  Will refill.  -- Following with nutrition. Encouraged increased protein. Monitor.  Weight stable.  -- Continue Miralax as needed for constipation. Doing well with this.     11. Hypotension   -- BP normal today.   -- Following with PCP.   -- Encouraged him and his daughter to monitor BP and HR closely at home.     12-13. Urinary Hesitancy/dehydration  -- Continue Flomax.   -- Reported improvement since starting medication.     RTC in 2 weeks    Patient and family members displayed understanding of the above encounter and treatment plan. All thoughtful questions were answered to their satisfaction. Patient was advised to notify the care team or proceed to the ER if signs and symptoms worsen.     30 minutes were spent today on this encounter including face to face time with the patient, data gathering/interpretation and documentation. Greater than 50% of this time involved counseling or coordination of care. I have provided the patient with an opportunity to ask questions and have all questions answered to patient's satisfaction.     Visit today included increased complexity associated with the care of the episodic problem chemotherapy  addressed and managing the longitudinal care of the patient due to the serious and/or complex managed problem(s) GI malignancies/cancer. In addition, the above encounter addresses an illness that poses a significant threat to life, bodily function and overall performance status.      code applied: patient requires or will require a continuous, longitudinal, and active collaborative plan of care related to this patient's health condition, GI malignancies/cancer --the management of which requires the direction of a practitioner with specialized clinical knowledge, skill, and expertise       FAN Meier, PA-C [facility]  Dept of Hematology/Oncology  PAKATHY to GI Oncology team         Route Chart for Scheduling    Med Onc Chart Routing      Follow up with  physician 4 weeks. Tox check with lab work. No infusion   Follow up with RACHEL 2 weeks and 6 weeks. Tox check with lab work. No infusion   Infusion scheduling note    Injection scheduling note    Labs CBC, CMP and CEA   Scheduling:  Preferred lab:  Lab interval: every 2 weeks     Imaging    Pharmacy appointment    Other referrals

## 2025-04-17 ENCOUNTER — OFFICE VISIT (OUTPATIENT)
Dept: HEMATOLOGY/ONCOLOGY | Facility: CLINIC | Age: 75
End: 2025-04-17
Payer: MEDICARE

## 2025-04-17 ENCOUNTER — LAB VISIT (OUTPATIENT)
Dept: LAB | Facility: HOSPITAL | Age: 75
End: 2025-04-17
Payer: MEDICARE

## 2025-04-17 VITALS
OXYGEN SATURATION: 100 % | HEART RATE: 87 BPM | BODY MASS INDEX: 17.25 KG/M2 | SYSTOLIC BLOOD PRESSURE: 130 MMHG | WEIGHT: 109.88 LBS | TEMPERATURE: 99 F | DIASTOLIC BLOOD PRESSURE: 79 MMHG | RESPIRATION RATE: 14 BRPM | HEIGHT: 67 IN

## 2025-04-17 DIAGNOSIS — T45.1X5A IMMUNODEFICIENCY DUE TO CHEMOTHERAPY: ICD-10-CM

## 2025-04-17 DIAGNOSIS — D84.81 IMMUNODEFICIENCY SECONDARY TO NEOPLASM: ICD-10-CM

## 2025-04-17 DIAGNOSIS — C78.7 METASTATIC COLON CANCER TO LIVER: ICD-10-CM

## 2025-04-17 DIAGNOSIS — D49.9 IMMUNODEFICIENCY SECONDARY TO NEOPLASM: ICD-10-CM

## 2025-04-17 DIAGNOSIS — C18.9 METASTATIC COLON CANCER TO LIVER: Primary | ICD-10-CM

## 2025-04-17 DIAGNOSIS — C18.9 METASTATIC COLON CANCER TO LIVER: ICD-10-CM

## 2025-04-17 DIAGNOSIS — T45.1X5A CHEMOTHERAPY INDUCED NEUTROPENIA: ICD-10-CM

## 2025-04-17 DIAGNOSIS — E43 SEVERE MALNUTRITION: ICD-10-CM

## 2025-04-17 DIAGNOSIS — D64.81 ANEMIA ASSOCIATED WITH CHEMOTHERAPY: ICD-10-CM

## 2025-04-17 DIAGNOSIS — I95.9 HYPOTENSION, UNSPECIFIED HYPOTENSION TYPE: ICD-10-CM

## 2025-04-17 DIAGNOSIS — T45.1X5A ANEMIA ASSOCIATED WITH CHEMOTHERAPY: ICD-10-CM

## 2025-04-17 DIAGNOSIS — K59.03 DRUG-INDUCED CONSTIPATION: ICD-10-CM

## 2025-04-17 DIAGNOSIS — R63.4 WEIGHT DECREASE: ICD-10-CM

## 2025-04-17 DIAGNOSIS — R39.9 LOWER URINARY TRACT SYMPTOMS (LUTS): ICD-10-CM

## 2025-04-17 DIAGNOSIS — G89.3 NEOPLASM RELATED PAIN: ICD-10-CM

## 2025-04-17 DIAGNOSIS — K92.2 LOWER GI BLEED: ICD-10-CM

## 2025-04-17 DIAGNOSIS — N17.9 AKI (ACUTE KIDNEY INJURY): ICD-10-CM

## 2025-04-17 DIAGNOSIS — Z79.69 IMMUNODEFICIENCY DUE TO CHEMOTHERAPY: ICD-10-CM

## 2025-04-17 DIAGNOSIS — D84.821 IMMUNODEFICIENCY DUE TO CHEMOTHERAPY: ICD-10-CM

## 2025-04-17 DIAGNOSIS — C78.7 METASTATIC COLON CANCER TO LIVER: Primary | ICD-10-CM

## 2025-04-17 DIAGNOSIS — D70.1 CHEMOTHERAPY INDUCED NEUTROPENIA: ICD-10-CM

## 2025-04-17 DIAGNOSIS — E86.0 DEHYDRATION: ICD-10-CM

## 2025-04-17 LAB
ABSOLUTE NEUTROPHIL COUNT (OHS): 3.01 K/UL (ref 1.8–7.7)
ALBUMIN SERPL BCP-MCNC: 2.5 G/DL (ref 3.5–5.2)
ALP SERPL-CCNC: 307 UNIT/L (ref 40–150)
ALT SERPL W/O P-5'-P-CCNC: 15 UNIT/L (ref 10–44)
ANION GAP (OHS): 8 MMOL/L (ref 8–16)
AST SERPL-CCNC: 31 UNIT/L (ref 11–45)
BILIRUB SERPL-MCNC: 1.2 MG/DL (ref 0.1–1)
BUN SERPL-MCNC: 7 MG/DL (ref 8–23)
CALCIUM SERPL-MCNC: 8.5 MG/DL (ref 8.7–10.5)
CARCINOEMBRYONIC ANTIGEN (OHS): 119.5 NG/ML
CHLORIDE SERPL-SCNC: 106 MMOL/L (ref 95–110)
CO2 SERPL-SCNC: 25 MMOL/L (ref 23–29)
CREAT SERPL-MCNC: 0.6 MG/DL (ref 0.5–1.4)
ERYTHROCYTE [DISTWIDTH] IN BLOOD BY AUTOMATED COUNT: 17.4 % (ref 11.5–14.5)
GFR SERPLBLD CREATININE-BSD FMLA CKD-EPI: >60 ML/MIN/1.73/M2
GLUCOSE SERPL-MCNC: 87 MG/DL (ref 70–110)
HCT VFR BLD AUTO: 32.1 % (ref 40–54)
HGB BLD-MCNC: 10 GM/DL (ref 14–18)
IMM GRANULOCYTES # BLD AUTO: 0.01 K/UL (ref 0–0.04)
MCH RBC QN AUTO: 28.7 PG (ref 27–31)
MCHC RBC AUTO-ENTMCNC: 31.2 G/DL (ref 32–36)
MCV RBC AUTO: 92 FL (ref 82–98)
PLATELET # BLD AUTO: 209 K/UL (ref 150–450)
PMV BLD AUTO: 10 FL (ref 9.2–12.9)
POTASSIUM SERPL-SCNC: 3.6 MMOL/L (ref 3.5–5.1)
PROT SERPL-MCNC: 6.1 GM/DL (ref 6–8.4)
RBC # BLD AUTO: 3.49 M/UL (ref 4.6–6.2)
SODIUM SERPL-SCNC: 139 MMOL/L (ref 136–145)
WBC # BLD AUTO: 4.78 K/UL (ref 3.9–12.7)

## 2025-04-17 PROCEDURE — 1126F AMNT PAIN NOTED NONE PRSNT: CPT | Mod: HCNC,CPTII,S$GLB, | Performed by: REGISTERED NURSE

## 2025-04-17 PROCEDURE — 85027 COMPLETE CBC AUTOMATED: CPT | Mod: HCNC

## 2025-04-17 PROCEDURE — 1160F RVW MEDS BY RX/DR IN RCRD: CPT | Mod: HCNC,CPTII,S$GLB, | Performed by: REGISTERED NURSE

## 2025-04-17 PROCEDURE — 3008F BODY MASS INDEX DOCD: CPT | Mod: HCNC,CPTII,S$GLB, | Performed by: REGISTERED NURSE

## 2025-04-17 PROCEDURE — 1101F PT FALLS ASSESS-DOCD LE1/YR: CPT | Mod: HCNC,CPTII,S$GLB, | Performed by: REGISTERED NURSE

## 2025-04-17 PROCEDURE — G2211 COMPLEX E/M VISIT ADD ON: HCPCS | Mod: HCNC,S$GLB,, | Performed by: REGISTERED NURSE

## 2025-04-17 PROCEDURE — 1159F MED LIST DOCD IN RCRD: CPT | Mod: HCNC,CPTII,S$GLB, | Performed by: REGISTERED NURSE

## 2025-04-17 PROCEDURE — 82378 CARCINOEMBRYONIC ANTIGEN: CPT | Mod: HCNC

## 2025-04-17 PROCEDURE — 3288F FALL RISK ASSESSMENT DOCD: CPT | Mod: HCNC,CPTII,S$GLB, | Performed by: REGISTERED NURSE

## 2025-04-17 PROCEDURE — 3075F SYST BP GE 130 - 139MM HG: CPT | Mod: HCNC,CPTII,S$GLB, | Performed by: REGISTERED NURSE

## 2025-04-17 PROCEDURE — 84075 ASSAY ALKALINE PHOSPHATASE: CPT | Mod: HCNC

## 2025-04-17 PROCEDURE — 99215 OFFICE O/P EST HI 40 MIN: CPT | Mod: HCNC,S$GLB,, | Performed by: REGISTERED NURSE

## 2025-04-17 PROCEDURE — 99999 PR PBB SHADOW E&M-EST. PATIENT-LVL IV: CPT | Mod: PBBFAC,HCNC,, | Performed by: REGISTERED NURSE

## 2025-04-17 PROCEDURE — 36415 COLL VENOUS BLD VENIPUNCTURE: CPT | Mod: HCNC

## 2025-04-17 PROCEDURE — 3078F DIAST BP <80 MM HG: CPT | Mod: HCNC,CPTII,S$GLB, | Performed by: REGISTERED NURSE

## 2025-04-17 NOTE — PROGRESS NOTES
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 Bonilla  12/30/2022: Y-90 to R liver  1/27/2023: Y-90 to R liver  5/17/2023: Y-90 to R liver  7/11/2023: C1 FOLFIRI + Pema  7/26/2023: C2 FOLFIRI + Pema  8/8/2023: C3 FOLFIRI + Pema  8/22/23: C4 FOLFIRI + Pema  Restaging CT CAP 9/1/23: Good response to chemo.  9/7/23: C5 FOLFIRI + Pema  ......................................  Restaging CT CAP 10/12/23: Good response to chemo  10/16/23: C8 FOLFIRI + Pema  .......................................  Restaging CT CAP 12/8/23: Good response to chemo  12/11/23: C12 FOLFIRI + Pema  .......................................  Restaging CT CAP 2/5/24: Good response to chemo  2/8/24: C16 FOLFIRI + Pema    Restaging CT CAP 4/26/24: Good response to chemo  Restaging CT CAP 6/6/24: Good response to chemo  Restaging CT CAP 8/1/24: Stable disease  Restaging CT CAP 10/11/24: Progression of R liver lobe mass.  Plan for Y-90 to this.  Restaging CT CAP 12/20/24: No new extrahepatic disease.  Plan for MWA to left lobe lesion.  Restaging CT CAP 3/18/25: Disease progression in liver.    Interval History:   He presents today after starting Lonsurf. Doing okay overall. He has been more fatigued and had less of an appetite recently. Trying to stay active and eating what is appetizing. Otherwise, no  concerns. No fevers, chills, SOB, CP, palpitations, nausea, bowel changes, overt bleeding, or skin changes. Starts next cycle on Monday. Reviewed admin instructions as he was taking medication once daily on days 1-5.     ECOG status is 1. Presents with his daughter today.    Past Medical History:   Past Medical History:   Diagnosis Date    MARIA A (acute kidney injury) 2022    Hypertension     Hypotension 2025    Metastatic colon cancer to liver 2021     Past Surgical HIstory:   Past Surgical History:   Procedure Laterality Date    COLONOSCOPY N/A 2021    Procedure: COLONOSCOPY with possible stent;  Surgeon: EDILMA Bonilla MD;  Location: [facility] ENDO (2ND FLR);  Service: Colon and Rectal;  Laterality: N/A;    COLONOSCOPY N/A 11/3/2023    Procedure: COLONOSCOPY;  Surgeon: EDILMA Bonilla MD;  Location: [facility] (4TH FLR);  Service: Endoscopy;  Laterality: N/A;  prep instructions sent to pt via portal  From: EDILMA Bonilla MD  Procedure: Colonoscopy  Diagnosis: Surveillance colonoscopy - Hx of colon cancer  Procedure Timin-12 weeks  Provider: Myself  [place] [facility] [place] [facility]  Additional Scheduling Information: No scheduling con    COLONOSCOPY N/A 7/10/2024    Procedure: COLONOSCOPY;  Surgeon: Tam Pantoja MD;  Location: [facility] ENDO (2ND FLR);  Service: Endoscopy;  Laterality: N/A;    DIAGNOSTIC LAPAROSCOPY N/A 2022    Procedure: LAPAROSCOPY, DIAGNOSTIC;  Surgeon: Adrian Rodriguez MD;  [facility];  Service: General;  Laterality: N/A;    INSERTION OF TUNNELED CENTRAL VENOUS CATHETER (CVC) WITH SUBCUTANEOUS PORT N/A 5/3/2021    Procedure: [id number];  Surgeon: Francisco Layton MD;  Location: [facility] OR 2ND FLR;  Service: Vascular;  Laterality: N/A;    OMENTECTOMY N/A 10/20/2022    Procedure: OMENTECTOMY;  Surgeon: EDILMA Bonilla MD;  [facility];  Service: Colon and Rectal;  Laterality: N/A;    RIGHT HEMICOLECTOMY N/A 10/20/2022    Procedure: HEMICOLECTOMY,  RIGHT, extended;  Surgeon: EDILMA Bonilla MD;  Location: [facility] OR [address] [place] [address];  Service: Colon and Rectal;  Laterality: N/A;  Extended right hemicolectomy CONSENT IN AM     Family History:   Family History   Problem Relation Name Age of Onset    Cancer Brother         Social History:  reports that he has never smoked. He has never used smokeless tobacco. He reports that he does not currently use alcohol. He reports that he does not use drugs.    Allergies:  Review of patient's allergies indicates:  No Known Allergies    Medications:  Current Outpatient Medications   Medication Sig Dispense Refill    acetaminophen (TYLENOL) 500 MG tablet Take 1 tablet (500 mg total) by mouth every 6 (six) hours as needed for Pain (alternate with ibuprofen).  0    amLODIPine (NORVASC) 10 MG tablet Take 1 tablet (10 mg total) by mouth once daily. 90 tablet 3    amoxicillin-clavulanate 875-125mg (AUGMENTIN) 875-125 mg per tablet Take 1 tablet by mouth 2 (two) times daily. (Patient not taking: Reported on 4/2/2025) 14 tablet 0    LIDOcaine-prilocaine (EMLA) cream Apply topically as needed (port application). 30 g 3    ondansetron (ZOFRAN) 4 MG tablet Take 1 tablet (4 mg total) by mouth every 8 (eight) hours as needed for Nausea. 30 tablet 3    ondansetron (ZOFRAN) 4 MG tablet Take 1 tablet (4 mg total) by mouth every 8 (eight) hours as needed for Nausea. 30 tablet 0    OPW TEST CLAIM - DO NOT FILL OPW test claim. Do not fill. 1 tablet 0    oxyCODONE-acetaminophen (PERCOCET) 5-325 mg per tablet Take 1 tablet by mouth every 6 (six) hours as needed for Pain. 60 tablet 0    pantoprazole (PROTONIX) 40 MG tablet Take 1 tablet (40 mg total) by mouth 2 (two) times daily before meals. 180 tablet 3    tamsulosin (FLOMAX) 0.4 mg Cap Take 1 capsule (0.4 mg total) by mouth once daily. 30 capsule 5    traMADoL (ULTRAM) 50 mg tablet Take 1 tablet (50 mg total) by mouth every 6 (six) hours as needed for Pain. (Patient not taking: Reported on 4/2/2025) 30  "tablet 0    trifluridine-tipiraciL (LONSURF) 15-6.14 mg Tab Take 3 tablets (45 mg) by mouth twice daily on days 1 through 5 of each 14 day cycle. 60 tablet 5     No current facility-administered medications for this visit.     Review of Systems   Constitutional:  Positive for fatigue. Negative for activity change, appetite change, chills, diaphoresis, fever and unexpected weight change.   HENT:  Negative for voice change.    Eyes:  Negative for visual disturbance.   Respiratory:  Negative for cough.    Cardiovascular:  Negative for chest pain and leg swelling.   Gastrointestinal:  Negative for abdominal pain, blood in stool, constipation, diarrhea, nausea and vomiting.   Musculoskeletal:  Negative for arthralgias and back pain.   Skin:  Negative for wound.   Neurological:  Negative for dizziness, weakness and numbness.   Psychiatric/Behavioral:  Negative for confusion and sleep disturbance. The patient is not nervous/anxious.    All other systems reviewed and are negative.    ECOG Performance Status: 1     Objective:      Vitals:   Vitals:    04/17/25 1507   BP: 130/79   BP Location: Left arm   Patient Position: Sitting   Pulse: 87   Resp: 14   Temp: 98.5 °F (36.9 °C)   TempSrc: Oral   SpO2: 100%   Weight: 49.9 kg (109 lb 14.4 oz)   Height: 5' 7" (1.702 m)       Physical Exam  Vitals reviewed.   Constitutional:       General: He is not in acute distress.     Comments: Chronically ill appearing   HENT:      Head: Normocephalic and atraumatic.      Right Ear: External ear normal.      Left Ear: External ear normal.      Nose: Nose normal.      Mouth/Throat:      Pharynx: Oropharynx is clear.   Eyes:      General: No scleral icterus.     Extraocular Movements: Extraocular movements intact.      Conjunctiva/sclera: Conjunctivae normal.   Cardiovascular:      Rate and Rhythm: Normal rate and regular rhythm.      Pulses: Normal pulses.      Heart sounds: Normal heart sounds. No murmur heard.  Pulmonary:      Effort: " Pulmonary effort is normal. No respiratory distress.      Breath sounds: Normal breath sounds. No wheezing.   Chest:      Comments: Port to RCW, no signs of infection.  Abdominal:      General: Abdomen is flat. Bowel sounds are normal. There is distension (mild).      Palpations: Abdomen is soft. There is no mass.      Tenderness: There is no abdominal tenderness.      Comments: Vertical midline abdominal wound well healed   Musculoskeletal:         General: No swelling or deformity. Normal range of motion.      Right lower leg: No edema.      Left lower leg: No edema.   Skin:     Coloration: Skin is not jaundiced or pale.      Findings: No bruising, erythema or rash.   Neurological:      General: No focal deficit present.      Mental Status: He is alert and oriented to person, place, and time. Mental status is at baseline.      Cranial Nerves: No cranial nerve deficit.      Sensory: No sensory deficit.      Gait: Gait normal.   Psychiatric:         Mood and Affect: Mood normal.         Behavior: Behavior normal.         Thought Content: Thought content normal.         Judgment: Judgment normal.     Laboratory Data:  No visits with results within 1 Week(s) from this visit.   Latest known visit with results is:   Lab Visit on 04/02/2025   Component Date Value Ref Range Status    Carcinoembryonic Antigen 04/02/2025 86.4 (H)  <=5.0 ng/mL Final    CEA Normal Range:  Non-Smokers: 0-3.0 ng/mL  Smokers:     0-5.0 ng/mL  The testing method is a chemiluminescent microparticle immunoassay manufactured by [employer] and performed on the [employer] system. Values obtained with different assay manufacturers for methods may be different and cannot be used interchangeably.    Sodium 04/02/2025 138  136 - 145 mmol/L Final    Potassium 04/02/2025 4.3  3.5 - 5.1 mmol/L Final    Chloride 04/02/2025 105  95 - 110 mmol/L Final    CO2 04/02/2025 26  23 - 29 mmol/L Final    Glucose 04/02/2025 84  70 - 110 mg/dL Final     BUN 04/02/2025 8  8 - 23 mg/dL Final    Creatinine 04/02/2025 0.6  0.5 - 1.4 mg/dL Final    Calcium 04/02/2025 8.8  8.7 - 10.5 mg/dL Final    Protein Total 04/02/2025 6.1  6.0 - 8.4 gm/dL Final    Albumin 04/02/2025 2.5 (L)  3.5 - 5.2 g/dL Final    Bilirubin Total 04/02/2025 1.2 (H)  0.1 - 1.0 mg/dL Final    For infants and newborns, interpretation of results should be based   on gestational age, weight and in agreement with clinical   observations.    Premature Infant recommended reference ranges:   0-24 hours:  <8.0 mg/dL   24-48 hours: <12.0 mg/dL   3-5 days:    <15.0 mg/dL   6-29 days:   <15.0 mg/dL    ALP 04/02/2025 311 (H)  40 - 150 unit/L Final    AST 04/02/2025 27  11 - 45 unit/L Final    ALT 04/02/2025 12  10 - 44 unit/L Final    Anion Gap 04/02/2025 7 (L)  8 - 16 mmol/L Final    eGFR 04/02/2025 >60  >60 mL/min/1.73/m2 Final    Estimated GFR calculated using the CKD-EPI creatinine (2021) equation.    WBC 04/02/2025 5.43  3.90 - 12.70 K/uL Final    RBC 04/02/2025 3.20 (L)  4.60 - 6.20 M/uL Final    HGB 04/02/2025 9.2 (L)  14.0 - 18.0 gm/dL Final    HCT 04/02/2025 30.1 (L)  40.0 - 54.0 % Final    MCV 04/02/2025 94  82 - 98 fL Final    MCH 04/02/2025 28.8  27.0 - 31.0 pg Final    MCHC 04/02/2025 30.6 (L)  32.0 - 36.0 g/dL Final    RDW 04/02/2025 18.3 (H)  11.5 - 14.5 % Final    Platelet Count 04/02/2025 230  150 - 450 K/uL Final    MPV 04/02/2025 10.8  9.2 - 12.9 fL Final    Neut # 04/02/2025 3.18  1.8 - 7.7 K/uL Final    Imm Grans # 04/02/2025 0.02  0.00 - 0.04 K/uL Final    Mild elevation in immature granulocytes is non specific and can be seen in a variety of conditions including stress response, acute inflammation, trauma and pregnancy. Correlation with other laboratory and clinical findings is essential.     Assessment and Plan        1. Metastatic colon cancer to liver    2. Immunodeficiency secondary to neoplasm    3. Immunodeficiency due to chemotherapy    4. Hypotension, unspecified hypotension  type    5. Neoplasm related pain    6. Weight decrease    7. Drug-induced constipation    8. Anemia associated with chemotherapy    9. Lower GI bleed    10. Chemotherapy induced neutropenia    11. Lower urinary tract symptoms (LUTS)    12. MARIA A (acute kidney injury)    13. Severe malnutrition    14. Dehydration        1-6.  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  Guardant 360 was negative for BRAF, VIRI, HER2 amplification.   Pretreatment CEA 57.  We had a long and sonia discussion with him about his diagnosis. Unfortunately, the disease is not curable but remains treatable and he has good performance status.   Restaging scans after 7 cycles of FOLFOX + Pema showed significant reduction in colonic mass and liver mass.   we switched to maintenance as of cycle 8 with 5FU + Pema  Restaging scans from March 2022 show stable disease.   MRI on 7/18/22 showing hepatic progression of disease in the right hepatic lobe noted on the exam. CT CAP on 8/26 shows some mild progression in both liver metastases.    Discussed case at colorectal tumor board 8/31/22 and had a diagnostic lap performed by Dr. Rodriguez on 9/7 to assess for any occult peritoneal disease. Findings from the diagnostic lap were negative. Fluid from around from around the primary mass was sent for cytology that was negative for malignancy.   Underwent primary tumor resection on 10/20/22 with Dr. Bonilla.    Meanwhile his liver mets had grown.  We discussed his case at [facility] conference with plans for short term Y-90 and then restarting chemotherapy prior to considering any surgical options to his liver metastases.  He underwent Y-90 delivery on 12/30/22. Tolerated well.   CT CAP on 1/23/23 reviewed at [facility] conference with good response to Y-90, no new lesions.  Underwent second Y-90 on 1/27/23. Can consider R hepatectomy pending follow-up imaging after Y-90.     Received cycle 42 of FOLFOX (omitted oxaliplatin again starting  cycle 41 due to neuropathy) on 2/9/23.  Because of 5-FU shortage nationally, we have been holding chemotherapy since mid-February 2023.  If he needs to restart chemotherapy (I.e. no plans for surgical resection), will place him on capecitabine maintenance for duration of 5-FU shortage.     Discussed at colorectal liver mets tumor board 3/16/23.  Plan for repeat Y-90 and then consideration of surgical resection and he will meet with liver transplant team as well.  Underwent Y-90 delivery 5/17/23 with IR.     Has been on maintenance Xeloda since late April 2023.    Met with liver transplant team but ultimately opted not to proceed with transplant as he was concerned about how he would tolerate a major surgery with the life changes that would come after transplant as well. They closed out his case.    He met with Dr. Rodriguez to discuss whether surgical resection is indicated for his liver mets.  He recommended repeat MRI.  Repeat MRI unfortunately showed disease progression while on Xeloda maintenance.  Similarly his CEA garrett precipitously, all in keeping with worsening disease.    We recommended we restart IV chemotherapy with FOLFIRI + Avastin (never had irinotecan).    Because of hyperbilirubinemia he received cycle 1 with just 5-FU + Avastin on 7/11/23. Tolerated this well. Bilirubin has improved.  Received irinotecan starting with cycle 2.  Repeat CT CAP after cycle 4 shows good response to therapy.   Excellent tolerance. [facility] tumor board agrees with continuation of chemotherapy.   Repeat CT CAP after cycle 7 shows stable disease, no evidence of new or worsening disease.   Repeat CT CAP after cycle 11 shows stable disease.   Repeat CT CAP after cycle 15 shows improved disease.  Repeat CT CAP after cycle 21 shows stable disease.   Repeat CT CAP after cycle 24 shows stable disease.    Hospitalized after cycle 26 and 27 (without Avastin) for hematochezia.  Colonoscopy with likely diverticular bleed.  Required 2 units  of blood in last month.  Will continue to hold bevacizumab indefinitely.  Discussed that we don't have a very good way preventing this recurrence and chemo with its associated thrombocytopenia may make this more likely to occur again.  He still wishes to proceed, which is my recommendation as well.    Repeat CT CAP after cycle 28 shows stable disease and recc to continue with chemotherapy.   Repeat CT CAP after cycle 32 shows progression of R liver lobe metastasis with rising CEA.  Will plan for Y-90 to growing lesion.  Underwent mapping 11/5/24 and delivery 11/21/24.  CT CAP 12/20/24 shows otherwise stable disease.    Underwent L lobe MWA per Dr. Vidal on 2/10/25.     Will continue FOLFIRI in the meantime.    Delayed cycle 33 due to neutropenia - has since recovered and on Neulasta.  CT CAP after cycle 40 shows disease progression in liver.  He will be seeing Dr. Vidal for potential Y90 though unclear if his liver can tolerate more.    We discussed switching systemic therapy.  Offered recycle of FOLFOX vs Lonsurf vs best supportive care.  He has opted for Lonsurf.   He started on 3/24/2025 and has done well. Reviewed administration instructions again today and advised to take 3 tablets twice daily on days 1-5 of each 14-day cycle.   Labs reviewed and adequate to continue.   - Proceed with Lonsurf.    RTC in 2 weeks for toxicity check    Tempus xT: APC, CKS1B cng, ERCC3 cnl, PIK3CA; KENIA, TMB 12.1.  Tempus xF: APC, KRAS Q61H, PIK3CA, TP53; KENIA    7-10. Neoplasm related pain, weight loss, constipation, malnutrition  -- Pain well controlled overall. Seldom takes Percocet.  Will refill.  -- Following with nutrition. Encouraged increased protein. Monitor.  Weight stable.  -- Continue Miralax as needed for constipation. Doing well with this.     11. Hypotension   -- BP normal today.   -- Following with PCP.   -- Encouraged him and his daughter to monitor BP and HR closely at home.     12-13. Urinary  Hesitancy/dehydration  -- Continue Flomax.   -- Reported improvement since starting medication.     RTC in 2 weeks    Patient is in agreement with the proposed treatment plan. All questions were answered to the patient's satisfaction. Pt knows to call clinic if anything is needed before the next clinic visit.    Patient discussed with collaborating physician, Dr. Schuster.    At least 40 minutes were spent today on this encounter including face to face time with the patient, data gathering/interpretation and documentation.       Natividad Maher, MSN, APRN, [facility]  Hematology and Medical Oncology  Clinical Nurse Specialist to Dr. Schuster, Dr. Quijano & Dr. Mueller         code applied: patient requires or will require a continuous, longitudinal, and active collaborative plan of care related to this patient's health condition, colon cancer --the management of which requires the direction of a practitioner with specialized clinical knowledge, skill, and expertise.     Route Chart for Scheduling    Med Onc Chart Routing      Follow up with physician 6 weeks. with labs to see Dr. Schuster   Follow up with RACHEL 2 weeks and 4 weeks. as scheduled   Infusion scheduling note    Injection scheduling note    Labs CBC, CMP and CEA   Scheduling:  Preferred lab:  Lab interval:     Imaging    Pharmacy appointment    Other referrals

## 2025-04-22 DIAGNOSIS — C18.9 METASTATIC COLON CANCER TO LIVER: Primary | ICD-10-CM

## 2025-04-22 DIAGNOSIS — C78.7 METASTATIC COLON CANCER TO LIVER: Primary | ICD-10-CM

## 2025-04-30 ENCOUNTER — OFFICE VISIT (OUTPATIENT)
Dept: HEMATOLOGY/ONCOLOGY | Facility: CLINIC | Age: 75
End: 2025-04-30
Payer: MEDICARE

## 2025-04-30 ENCOUNTER — LAB VISIT (OUTPATIENT)
Dept: LAB | Facility: HOSPITAL | Age: 75
End: 2025-04-30
Payer: MEDICARE

## 2025-04-30 VITALS
OXYGEN SATURATION: 98 % | HEIGHT: 67 IN | WEIGHT: 112.88 LBS | SYSTOLIC BLOOD PRESSURE: 115 MMHG | HEART RATE: 87 BPM | TEMPERATURE: 98 F | DIASTOLIC BLOOD PRESSURE: 74 MMHG | BODY MASS INDEX: 17.72 KG/M2 | RESPIRATION RATE: 18 BRPM

## 2025-04-30 DIAGNOSIS — D64.81 ANEMIA ASSOCIATED WITH CHEMOTHERAPY: ICD-10-CM

## 2025-04-30 DIAGNOSIS — T45.1X5A IMMUNODEFICIENCY DUE TO CHEMOTHERAPY: ICD-10-CM

## 2025-04-30 DIAGNOSIS — K92.2 LOWER GI BLEED: ICD-10-CM

## 2025-04-30 DIAGNOSIS — D84.821 IMMUNODEFICIENCY DUE TO CHEMOTHERAPY: ICD-10-CM

## 2025-04-30 DIAGNOSIS — Z79.69 IMMUNODEFICIENCY DUE TO CHEMOTHERAPY: ICD-10-CM

## 2025-04-30 DIAGNOSIS — T45.1X5A CHEMOTHERAPY INDUCED NEUTROPENIA: ICD-10-CM

## 2025-04-30 DIAGNOSIS — D49.9 IMMUNODEFICIENCY SECONDARY TO NEOPLASM: ICD-10-CM

## 2025-04-30 DIAGNOSIS — R63.4 WEIGHT DECREASE: ICD-10-CM

## 2025-04-30 DIAGNOSIS — I95.9 HYPOTENSION, UNSPECIFIED HYPOTENSION TYPE: ICD-10-CM

## 2025-04-30 DIAGNOSIS — T45.1X5A ANEMIA ASSOCIATED WITH CHEMOTHERAPY: ICD-10-CM

## 2025-04-30 DIAGNOSIS — D70.1 CHEMOTHERAPY INDUCED NEUTROPENIA: ICD-10-CM

## 2025-04-30 DIAGNOSIS — D84.81 IMMUNODEFICIENCY SECONDARY TO NEOPLASM: ICD-10-CM

## 2025-04-30 DIAGNOSIS — G89.3 NEOPLASM RELATED PAIN: ICD-10-CM

## 2025-04-30 DIAGNOSIS — C18.9 METASTATIC COLON CANCER TO LIVER: ICD-10-CM

## 2025-04-30 DIAGNOSIS — C78.7 METASTATIC COLON CANCER TO LIVER: Primary | ICD-10-CM

## 2025-04-30 DIAGNOSIS — C78.7 METASTATIC COLON CANCER TO LIVER: ICD-10-CM

## 2025-04-30 DIAGNOSIS — C18.9 METASTATIC COLON CANCER TO LIVER: Primary | ICD-10-CM

## 2025-04-30 DIAGNOSIS — K59.03 DRUG-INDUCED CONSTIPATION: ICD-10-CM

## 2025-04-30 LAB
ABSOLUTE EOSINOPHIL (OHS): 0.08 K/UL
ABSOLUTE MONOCYTE (OHS): 0.48 K/UL (ref 0.3–1)
ABSOLUTE NEUTROPHIL COUNT (OHS): 3.24 K/UL (ref 1.8–7.7)
ALBUMIN SERPL BCP-MCNC: 2.5 G/DL (ref 3.5–5.2)
ALP SERPL-CCNC: 333 UNIT/L (ref 40–150)
ALT SERPL W/O P-5'-P-CCNC: 16 UNIT/L (ref 10–44)
ANION GAP (OHS): 8 MMOL/L (ref 8–16)
AST SERPL-CCNC: 35 UNIT/L (ref 11–45)
BASOPHILS # BLD AUTO: 0.02 K/UL
BASOPHILS NFR BLD AUTO: 0.4 %
BILIRUB SERPL-MCNC: 1.5 MG/DL (ref 0.1–1)
BUN SERPL-MCNC: 7 MG/DL (ref 8–23)
CALCIUM SERPL-MCNC: 8.6 MG/DL (ref 8.7–10.5)
CARCINOEMBRYONIC ANTIGEN (OHS): 273.1 NG/ML
CHLORIDE SERPL-SCNC: 106 MMOL/L (ref 95–110)
CO2 SERPL-SCNC: 26 MMOL/L (ref 23–29)
CREAT SERPL-MCNC: 0.7 MG/DL (ref 0.5–1.4)
ERYTHROCYTE [DISTWIDTH] IN BLOOD BY AUTOMATED COUNT: 17.6 % (ref 11.5–14.5)
GFR SERPLBLD CREATININE-BSD FMLA CKD-EPI: >60 ML/MIN/1.73/M2
GLUCOSE SERPL-MCNC: 96 MG/DL (ref 70–110)
HCT VFR BLD AUTO: 32.1 % (ref 40–54)
HGB BLD-MCNC: 9.9 GM/DL (ref 14–18)
IMM GRANULOCYTES # BLD AUTO: 0.03 K/UL (ref 0–0.04)
IMM GRANULOCYTES NFR BLD AUTO: 0.7 % (ref 0–0.5)
LYMPHOCYTES # BLD AUTO: 0.75 K/UL (ref 1–4.8)
MCH RBC QN AUTO: 28 PG (ref 27–31)
MCHC RBC AUTO-ENTMCNC: 30.8 G/DL (ref 32–36)
MCV RBC AUTO: 91 FL (ref 82–98)
NUCLEATED RBC (/100WBC) (OHS): 0 /100 WBC
PLATELET # BLD AUTO: 190 K/UL (ref 150–450)
PMV BLD AUTO: 10.2 FL (ref 9.2–12.9)
POTASSIUM SERPL-SCNC: 4.1 MMOL/L (ref 3.5–5.1)
PROT SERPL-MCNC: 6.3 GM/DL (ref 6–8.4)
RBC # BLD AUTO: 3.53 M/UL (ref 4.6–6.2)
RELATIVE EOSINOPHIL (OHS): 1.7 %
RELATIVE LYMPHOCYTE (OHS): 16.3 % (ref 18–48)
RELATIVE MONOCYTE (OHS): 10.4 % (ref 4–15)
RELATIVE NEUTROPHIL (OHS): 70.5 % (ref 38–73)
SODIUM SERPL-SCNC: 140 MMOL/L (ref 136–145)
WBC # BLD AUTO: 4.6 K/UL (ref 3.9–12.7)

## 2025-04-30 PROCEDURE — 85025 COMPLETE CBC W/AUTO DIFF WBC: CPT | Mod: HCNC

## 2025-04-30 PROCEDURE — 3288F FALL RISK ASSESSMENT DOCD: CPT | Mod: CPTII,HCNC,S$GLB, | Performed by: PHYSICIAN ASSISTANT

## 2025-04-30 PROCEDURE — 3008F BODY MASS INDEX DOCD: CPT | Mod: CPTII,HCNC,S$GLB, | Performed by: PHYSICIAN ASSISTANT

## 2025-04-30 PROCEDURE — 1101F PT FALLS ASSESS-DOCD LE1/YR: CPT | Mod: CPTII,HCNC,S$GLB, | Performed by: PHYSICIAN ASSISTANT

## 2025-04-30 PROCEDURE — 1159F MED LIST DOCD IN RCRD: CPT | Mod: CPTII,HCNC,S$GLB, | Performed by: PHYSICIAN ASSISTANT

## 2025-04-30 PROCEDURE — 36415 COLL VENOUS BLD VENIPUNCTURE: CPT | Mod: HCNC

## 2025-04-30 PROCEDURE — 82374 ASSAY BLOOD CARBON DIOXIDE: CPT | Mod: HCNC

## 2025-04-30 PROCEDURE — 1126F AMNT PAIN NOTED NONE PRSNT: CPT | Mod: CPTII,HCNC,S$GLB, | Performed by: PHYSICIAN ASSISTANT

## 2025-04-30 PROCEDURE — 99215 OFFICE O/P EST HI 40 MIN: CPT | Mod: HCNC,S$GLB,, | Performed by: PHYSICIAN ASSISTANT

## 2025-04-30 PROCEDURE — 99999 PR PBB SHADOW E&M-EST. PATIENT-LVL IV: CPT | Mod: PBBFAC,HCNC,, | Performed by: PHYSICIAN ASSISTANT

## 2025-04-30 PROCEDURE — 82378 CARCINOEMBRYONIC ANTIGEN: CPT | Mod: HCNC

## 2025-04-30 PROCEDURE — G2211 COMPLEX E/M VISIT ADD ON: HCPCS | Mod: HCNC,S$GLB,, | Performed by: PHYSICIAN ASSISTANT

## 2025-04-30 PROCEDURE — 3074F SYST BP LT 130 MM HG: CPT | Mod: CPTII,HCNC,S$GLB, | Performed by: PHYSICIAN ASSISTANT

## 2025-04-30 PROCEDURE — 3078F DIAST BP <80 MM HG: CPT | Mod: CPTII,HCNC,S$GLB, | Performed by: PHYSICIAN ASSISTANT

## 2025-04-30 NOTE — PROGRESS NOTES
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 Bonilla  12/30/2022: Y-90 to R liver  1/27/2023: Y-90 to R liver  5/17/2023: Y-90 to R liver  7/11/2023: C1 FOLFIRI + Pema  7/26/2023: C2 FOLFIRI + Pema  8/8/2023: C3 FOLFIRI + Pema  8/22/23: C4 FOLFIRI + Pema  Restaging CT CAP 9/1/23: Good response to chemo.  9/7/23: C5 FOLFIRI + Pema  ......................................  Restaging CT CAP 10/12/23: Good response to chemo  10/16/23: C8 FOLFIRI + Pema  .......................................  Restaging CT CAP 12/8/23: Good response to chemo  12/11/23: C12 FOLFIRI + Pema  .......................................  Restaging CT CAP 2/5/24: Good response to chemo  2/8/24: C16 FOLFIRI + Pema    Restaging CT CAP 4/26/24: Good response to chemo  Restaging CT CAP 6/6/24: Good response to chemo  Restaging CT CAP 8/1/24: Stable disease  Restaging CT CAP 10/11/24: Progression of R liver lobe mass.  Plan for Y-90 to this.  Restaging CT CAP 12/20/24: No new extrahepatic disease.  Plan for MWA to left lobe lesion.  Restaging CT CAP 3/18/25: Disease progression in liver.    Interval History:   He presents today with his daughter s/p cycle 40 of FOLFIRI (off Avastin due to GIB), now on single agent Lonsurf. We discussed switching systemic therapy.  Offered  recycle of FOLFOX vs Lonsurf vs best supportive care. He has opted for Lonsurf. He started on 3/24/2025 and has done well.   - Tolerating the treatment well. He is eating well and has a good appetite. No nausea or vomiting. No diarrhea and he continues to be active. He states his strength is good.  Does note some new lower back midline pain.  This has not worsened. Has some LE swelling but this improves after elevation. Ambulating well.     ECOG status is 1. Presents with his daughter today.    Past Medical History:   Past Medical History:   Diagnosis Date    MARIA A (acute kidney injury) 2022    Hypertension     Hypotension 2025    Metastatic colon cancer to liver 2021     Past Surgical HIstory:   Past Surgical History:   Procedure Laterality Date    COLONOSCOPY N/A 2021    Procedure: COLONOSCOPY with possible stent;  Surgeon: EDILMA Bonilla MD;  Location: [facility] (2ND FLR);  Service: Colon and Rectal;  Laterality: N/A;    COLONOSCOPY N/A 11/3/2023    Procedure: COLONOSCOPY;  Surgeon: EDILMA Bonilla MD;  Location: [facility] (4TH FLR);  Service: Endoscopy;  Laterality: N/A;  prep instructions sent to pt via portal  From: EDILMA Bonilla MD  Procedure: Colonoscopy  Diagnosis: Surveillance colonoscopy - Hx of colon cancer  Procedure Timin-12 weeks  Provider: Myself  Location: [address]  Additional Scheduling Information: No scheduling con    COLONOSCOPY N/A 7/10/2024    Procedure: COLONOSCOPY;  Surgeon: Tam Pantoja MD;  Location: [facility] (2ND FLR);  Service: Endoscopy;  Laterality: N/A;    DIAGNOSTIC LAPAROSCOPY N/A 2022    Procedure: LAPAROSCOPY, DIAGNOSTIC;  Surgeon: Adrian Rodriguez MD;  Location: [facility] OR [address] [place] [address];  Service: General;  Laterality: N/A;    INSERTION OF TUNNELED CENTRAL VENOUS CATHETER (CVC) WITH SUBCUTANEOUS PORT N/A 5/3/2021    Procedure: FTEGIFOCU-OWTE-L-CATH;  Surgeon: Francisco Layton MD;  Location: [facility] OR 2ND FLR;  Service: Vascular;  Laterality: N/A;     OMENTECTOMY N/A 10/20/2022    Procedure: OMENTECTOMY;  Surgeon: EDILMA Bonilla MD;  Location: [facility] OR 2ND FLR;  Service: Colon and Rectal;  Laterality: N/A;    RIGHT HEMICOLECTOMY N/A 10/20/2022    Procedure: HEMICOLECTOMY, RIGHT, extended;  Surgeon: EDILMA Bonilla MD;  Location: [facility] OR 2ND FLR;  Service: Colon and Rectal;  Laterality: N/A;  Extended right hemicolectomy CONSENT IN AM     Family History:   Family History   Problem Relation Name Age of Onset    Cancer Brother         Social History:  reports that he has never smoked. He has never used smokeless tobacco. He reports that he does not currently use alcohol. He reports that he does not use drugs.    Allergies:  Review of patient's allergies indicates:  No Known Allergies    Medications:  Current Outpatient Medications   Medication Sig Dispense Refill    acetaminophen (TYLENOL) 500 MG tablet Take 1 tablet (500 mg total) by mouth every 6 (six) hours as needed for Pain (alternate with ibuprofen).  0    amLODIPine (NORVASC) 10 MG tablet Take 1 tablet (10 mg total) by mouth once daily. 90 tablet 3    amoxicillin-clavulanate 875-125mg (AUGMENTIN) 875-125 mg per tablet Take 1 tablet by mouth 2 (two) times daily. (Patient not taking: Reported on 4/2/2025) 14 tablet 0    LIDOcaine-prilocaine (EMLA) cream Apply topically as needed (port application). 30 g 3    ondansetron (ZOFRAN) 4 MG tablet Take 1 tablet (4 mg total) by mouth every 8 (eight) hours as needed for Nausea. 30 tablet 3    ondansetron (ZOFRAN) 4 MG tablet Take 1 tablet (4 mg total) by mouth every 8 (eight) hours as needed for Nausea. 30 tablet 0    OPW TEST CLAIM - DO NOT FILL OPW test claim. Do not fill. 1 tablet 0    oxyCODONE-acetaminophen (PERCOCET) 5-325 mg per tablet Take 1 tablet by mouth every 6 (six) hours as needed for Pain. 60 tablet 0    pantoprazole (PROTONIX) 40 MG tablet Take 1 tablet (40 mg total) by mouth 2 (two) times daily before meals. 180 tablet 3    tamsulosin (FLOMAX) 0.4 mg  "Cap Take 1 capsule (0.4 mg total) by mouth once daily. 30 capsule 5    traMADoL (ULTRAM) 50 mg tablet Take 1 tablet (50 mg total) by mouth every 6 (six) hours as needed for Pain. (Patient not taking: Reported on 4/2/2025) 30 tablet 0    trifluridine-tipiraciL (LONSURF) 15-6.14 mg Tab Take 3 tablets (45 mg) by mouth twice daily on days 1 through 5 of each 14 day cycle. 60 tablet 5     No current facility-administered medications for this visit.     Review of Systems   Constitutional:  Positive for fatigue. Negative for activity change, appetite change, chills, diaphoresis, fever and unexpected weight change.   HENT:  Negative for voice change.    Eyes:  Negative for visual disturbance.   Respiratory:  Negative for cough.    Cardiovascular:  Negative for chest pain and leg swelling.   Gastrointestinal:  Negative for abdominal pain, blood in stool, constipation, diarrhea, nausea and vomiting.   Musculoskeletal:  Negative for arthralgias and back pain.   Skin:  Negative for wound.   Neurological:  Negative for dizziness, weakness and numbness.   Psychiatric/Behavioral:  Negative for confusion and sleep disturbance. The patient is not nervous/anxious.    All other systems reviewed and are negative.    ECOG Performance Status: 1     Objective:      Vitals:   Vitals:    04/30/25 1421   BP: 115/74   Pulse: 87   Resp: 18   Temp: 97.7 °F (36.5 °C)   TempSrc: Oral   SpO2: 98%   Weight: 51.2 kg (112 lb 14 oz)   Height: 5' 7" (1.702 m)         Physical Exam  Vitals reviewed.   Constitutional:       General: He is not in acute distress.     Comments: Chronically ill appearing   HENT:      Head: Normocephalic and atraumatic.      Right Ear: External ear normal.      Left Ear: External ear normal.      Nose: Nose normal.      Mouth/Throat:      Pharynx: Oropharynx is clear.   Eyes:      General: No scleral icterus.     Extraocular Movements: Extraocular movements intact.      Conjunctiva/sclera: Conjunctivae normal. "   Cardiovascular:      Rate and Rhythm: Normal rate and regular rhythm.      Pulses: Normal pulses.      Heart sounds: Normal heart sounds. No murmur heard.  Pulmonary:      Effort: Pulmonary effort is normal. No respiratory distress.      Breath sounds: Normal breath sounds. No wheezing.   Chest:      Comments: Port to RCW, no signs of infection.  Abdominal:      General: Abdomen is flat. Bowel sounds are normal. There is distension (mild).      Palpations: Abdomen is soft. There is no mass.      Tenderness: There is no abdominal tenderness.      Comments: Vertical midline abdominal wound well healed   Musculoskeletal:         General: No swelling or deformity. Normal range of motion.      Right lower leg: No edema.      Left lower leg: No edema.   Skin:     Coloration: Skin is not jaundiced or pale.      Findings: No bruising, erythema or rash.   Neurological:      General: No focal deficit present.      Mental Status: He is alert and oriented to person, place, and time. Mental status is at baseline.      Cranial Nerves: No cranial nerve deficit.      Sensory: No sensory deficit.      Gait: Gait normal.   Psychiatric:         Mood and Affect: Mood normal.         Behavior: Behavior normal.         Thought Content: Thought content normal.         Judgment: Judgment normal.     Laboratory Data:  Lab Visit on 04/30/2025   Component Date Value Ref Range Status    Sodium 04/30/2025 140  136 - 145 mmol/L Final    Potassium 04/30/2025 4.1  3.5 - 5.1 mmol/L Final    Chloride 04/30/2025 106  95 - 110 mmol/L Final    CO2 04/30/2025 26  23 - 29 mmol/L Final    Glucose 04/30/2025 96  70 - 110 mg/dL Final    BUN 04/30/2025 7 (L)  8 - 23 mg/dL Final    Creatinine 04/30/2025 0.7  0.5 - 1.4 mg/dL Final    Calcium 04/30/2025 8.6 (L)  8.7 - 10.5 mg/dL Final    Protein Total 04/30/2025 6.3  6.0 - 8.4 gm/dL Final    Albumin 04/30/2025 2.5 (L)  3.5 - 5.2 g/dL Final    Bilirubin Total 04/30/2025 1.5 (H)  0.1 - 1.0 mg/dL Final    For  infants and newborns, interpretation of results should be based   on gestational age, weight and in agreement with clinical   observations.    Premature Infant recommended reference ranges:   0-24 hours:  <8.0 mg/dL   24-48 hours: <12.0 mg/dL   3-5 days:    <15.0 mg/dL   6-29 days:   <15.0 mg/dL    ALP 04/30/2025 333 (H)  40 - 150 unit/L Final    AST 04/30/2025 35  11 - 45 unit/L Final    ALT 04/30/2025 16  10 - 44 unit/L Final    Anion Gap 04/30/2025 8  8 - 16 mmol/L Final    eGFR 04/30/2025 >60  >60 mL/min/1.73/m2 Final    Estimated GFR calculated using the CKD-EPI creatinine (2021) equation.    Carcinoembryonic Antigen 04/30/2025 273.1 (H)  <=5.0 ng/mL Final    CEA Normal Range:  Non-Smokers: 0-3.0 ng/mL  Smokers:     0-5.0 ng/mL  The testing method is a chemiluminescent microparticle immunoassay manufactured by [employer] and performed on the Keepio or Sounday system. Values obtained with different assay manufacturers for methods may be different and cannot be used interchangeably.    WBC 04/30/2025 4.60  3.90 - 12.70 K/uL Final    RBC 04/30/2025 3.53 (L)  4.60 - 6.20 M/uL Final    HGB 04/30/2025 9.9 (L)  14.0 - 18.0 gm/dL Final    HCT 04/30/2025 32.1 (L)  40.0 - 54.0 % Final    MCV 04/30/2025 91  82 - 98 fL Final    MCH 04/30/2025 28.0  27.0 - 31.0 pg Final    MCHC 04/30/2025 30.8 (L)  32.0 - 36.0 g/dL Final    RDW 04/30/2025 17.6 (H)  11.5 - 14.5 % Final    Platelet Count 04/30/2025 190  150 - 450 K/uL Final    MPV 04/30/2025 10.2  9.2 - 12.9 fL Final    Nucleated RBC 04/30/2025 0  <=0 /100 WBC Final    Neut % 04/30/2025 70.5  38 - 73 % Final    Lymph % 04/30/2025 16.3 (L)  18 - 48 % Final    Mono % 04/30/2025 10.4  4 - 15 % Final    Eos % 04/30/2025 1.7  <=8 % Final    Basophil % 04/30/2025 0.4  <=1.9 % Final    Imm Grans % 04/30/2025 0.7 (H)  0.0 - 0.5 % Final    Neut # 04/30/2025 3.24  1.8 - 7.7 K/uL Final    Lymph # 04/30/2025 0.75 (L)  1 - 4.8 K/uL Final    Mono # 04/30/2025 0.48  0.3 - 1  K/uL Final    Eos # 04/30/2025 0.08  <=0.5 K/uL Final    Baso # 04/30/2025 0.02  <=0.2 K/uL Final    Imm Grans # 04/30/2025 0.03  0.00 - 0.04 K/uL Final    Mild elevation in immature granulocytes is non specific and can be seen in a variety of conditions including stress response, acute inflammation, trauma and pregnancy. Correlation with other laboratory and clinical findings is essential.     Assessment and Plan        1. Metastatic colon cancer to liver    2. Immunodeficiency secondary to neoplasm    3. Immunodeficiency due to chemotherapy    4. Anemia associated with chemotherapy    5. Lower GI bleed    6. Chemotherapy induced neutropenia    7. Neoplasm related pain    8. Weight decrease    9. Drug-induced constipation    10. Hypotension, unspecified hypotension type      1-6.  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  Guardant 360 was negative for BRAF, VIRI, HER2 amplification.   Pretreatment CEA 57.  We had a long and sonia discussion with him about his diagnosis. Unfortunately, the disease is not curable but remains treatable and he has good performance status.   Restaging scans after 7 cycles of FOLFOX + Pema showed significant reduction in colonic mass and liver mass.   we switched to maintenance as of cycle 8 with 5FU + Pema  Restaging scans from March 2022 show stable disease.   MRI on 7/18/22 showing hepatic progression of disease in the right hepatic lobe noted on the exam. CT CAP on 8/26 shows some mild progression in both liver metastases.    Discussed case at colorectal tumor board 8/31/22 and had a diagnostic lap performed by Dr. Rodriguez on 9/7 to assess for any occult peritoneal disease. Findings from the diagnostic lap were negative. Fluid from around from around the primary mass was sent for cytology that was negative for malignancy.   Underwent primary tumor resection on 10/20/22 with Dr. Bonilla.    Meanwhile his liver mets had grown.  We discussed his case at  [facility] conference with plans for short term Y-90 and then restarting chemotherapy prior to considering any surgical options to his liver metastases.  He underwent Y-90 delivery on 12/30/22. Tolerated well.   CT CAP on 1/23/23 reviewed at [facility] conference with good response to Y-90, no new lesions.  Underwent second Y-90 on 1/27/23. Can consider R hepatectomy pending follow-up imaging after Y-90.     Received cycle 42 of FOLFOX (omitted oxaliplatin again starting cycle 41 due to neuropathy) on 2/9/23.  Because of 5-FU shortage nationally, we have been holding chemotherapy since mid-February 2023.  If he needs to restart chemotherapy (I.e. no plans for surgical resection), will place him on capecitabine maintenance for duration of 5-FU shortage.     Discussed at colorectal liver mets tumor board 3/16/23.  Plan for repeat Y-90 and then consideration of surgical resection and he will meet with liver transplant team as well.  Underwent Y-90 delivery 5/17/23 with IR.     Has been on maintenance Xeloda since late April 2023.    Met with liver transplant team but ultimately opted not to proceed with transplant as he was concerned about how he would tolerate a major surgery with the life changes that would come after transplant as well. They closed out his case.    He met with Dr. Rodriguez to discuss whether surgical resection is indicated for his liver mets.  He recommended repeat MRI.  Repeat MRI unfortunately showed disease progression while on Xeloda maintenance.  Similarly his CEA garrett precipitously, all in keeping with worsening disease.    We recommended we restart IV chemotherapy with FOLFIRI + Avastin (never had irinotecan).    Because of hyperbilirubinemia he received cycle 1 with just 5-FU + Avastin on 7/11/23. Tolerated this well. Bilirubin has improved.  Received irinotecan starting with cycle 2.  Repeat CT CAP after cycle 4 shows good response to therapy.   Excellent tolerance. mCR tumor board agrees with  continuation of chemotherapy.   Repeat CT CAP after cycle 7 shows stable disease, no evidence of new or worsening disease.   Repeat CT CAP after cycle 11 shows stable disease.   Repeat CT CAP after cycle 15 shows improved disease.  Repeat CT CAP after cycle 21 shows stable disease.   Repeat CT CAP after cycle 24 shows stable disease.    Hospitalized after cycle 26 and 27 (without Avastin) for hematochezia.  Colonoscopy with likely diverticular bleed.  Required 2 units of blood in last month.  Will continue to hold bevacizumab indefinitely.  Discussed that we don't have a very good way preventing this recurrence and chemo with its associated thrombocytopenia may make this more likely to occur again.  He still wishes to proceed, which is my recommendation as well.    Repeat CT CAP after cycle 28 shows stable disease and recc to continue with chemotherapy.   Repeat CT CAP after cycle 32 shows progression of R liver lobe metastasis with rising CEA.  Will plan for Y-90 to growing lesion.  Underwent mapping 11/5/24 and delivery 11/21/24.  CT CAP 12/20/24 shows otherwise stable disease.    Underwent L lobe MWA per Dr. Vidal on 2/10/25.     Will continue FOLFIRI in the meantime.    Delayed cycle 33 due to neutropenia - has since recovered and on Neulasta.  CT CAP after cycle 40 shows disease progression in liver.  He will be seeing Dr. Vidal for potential Y90 though unclear if his liver can tolerate more.    we discussed switching systemic therapy.  Offered recycle of FOLFOX vs Lonsurf vs best supportive care.  He has opted for Lonsurf. He started on 3/24/2025 and has done well.   - Tolerating the treatment well. He is eating well and has a good appetite. No nausea or vomiting. No diarrhea and he continues to be active. Does have some LE swelling but this improves with elevation. No other concerns or complaints today   - I have personally reviewed the patient's lab work today, including CBC and CMP. ANC is adequate for  treatment.   - CEA has increased but he has only had two cycles of the Lonsurf. Recc to proceed  - Proceed with Lonsurf next week, 5/5/2025.    RTC in 2 weeks for toxicity check. Will repeat imaging studies after 2 mos of chemotherapy    Tempus xT: APC, CKS1B cng, ERCC3 cnl, PIK3CA; KENIA, TMB 12.1.  Tempus xF: APC, KRAS Q61H, PIK3CA, TP53; KENIA    7-10. Neoplasm related pain, weight loss, constipation, malnutrition  -- Pain well controlled overall. Seldom takes Percocet.  Will refill.  -- Following with nutrition. Encouraged increased protein. Monitor.  Weight stable.  -- Continue Miralax as needed for constipation. Doing well with this.     11. Hypotension   -- BP normal today.   -- Following with PCP.   -- Encouraged him and his daughter to monitor BP and HR closely at home.     12-13. Urinary Hesitancy/dehydration  -- Continue Flomax.   -- Reported improvement since starting medication.     RTC in 2 weeks    Patient and family members displayed understanding of the above encounter and treatment plan. All thoughtful questions were answered to their satisfaction. Patient was advised to notify the care team or proceed to the ER if signs and symptoms worsen.     30 minutes were spent today on this encounter including face to face time with the patient, data gathering/interpretation and documentation. Greater than 50% of this time involved counseling or coordination of care. I have provided the patient with an opportunity to ask questions and have all questions answered to patient's satisfaction.     Visit today included increased complexity associated with the care of the episodic problem chemotherapy  addressed and managing the longitudinal care of the patient due to the serious and/or complex managed problem(s) GI malignancies/cancer. In addition, the above encounter addresses an illness that poses a significant threat to life, bodily function and overall performance status.      code applied: patient requires  or will require a continuous, longitudinal, and active collaborative plan of care related to this patient's health condition, GI malignancies/cancer --the management of which requires the direction of a practitioner with specialized clinical knowledge, skill, and expertise       FAN Meier, PAAidenC  [facility]  Dept of Hematology/Oncology  PAKATHY to GI Oncology team         Route Chart for Scheduling    Med Onc Chart Routing      Follow up with physician 6 weeks and 2 months. toxicity check   Follow up with RACHEL 2 weeks and 4 weeks. toxicity check   Infusion scheduling note    Injection scheduling note    Labs CBC, CMP and CEA   Scheduling:  Preferred lab:  Lab interval: every 2 weeks     Imaging    Pharmacy appointment    Other referrals

## 2025-05-15 ENCOUNTER — OFFICE VISIT (OUTPATIENT)
Dept: HEMATOLOGY/ONCOLOGY | Facility: CLINIC | Age: 75
End: 2025-05-15
Payer: MEDICARE

## 2025-05-15 ENCOUNTER — LAB VISIT (OUTPATIENT)
Dept: LAB | Facility: HOSPITAL | Age: 75
End: 2025-05-15
Payer: MEDICARE

## 2025-05-15 VITALS
BODY MASS INDEX: 17.73 KG/M2 | WEIGHT: 113.19 LBS | SYSTOLIC BLOOD PRESSURE: 112 MMHG | HEART RATE: 95 BPM | RESPIRATION RATE: 18 BRPM | DIASTOLIC BLOOD PRESSURE: 74 MMHG | TEMPERATURE: 98 F | OXYGEN SATURATION: 100 %

## 2025-05-15 DIAGNOSIS — C78.7 METASTATIC COLON CANCER TO LIVER: Primary | ICD-10-CM

## 2025-05-15 DIAGNOSIS — R63.4 WEIGHT DECREASE: ICD-10-CM

## 2025-05-15 DIAGNOSIS — G89.3 NEOPLASM RELATED PAIN: ICD-10-CM

## 2025-05-15 DIAGNOSIS — K92.2 LOWER GI BLEED: ICD-10-CM

## 2025-05-15 DIAGNOSIS — D84.821 IMMUNODEFICIENCY DUE TO CHEMOTHERAPY: ICD-10-CM

## 2025-05-15 DIAGNOSIS — E43 SEVERE MALNUTRITION: ICD-10-CM

## 2025-05-15 DIAGNOSIS — C18.9 METASTATIC COLON CANCER TO LIVER: Primary | ICD-10-CM

## 2025-05-15 DIAGNOSIS — T45.1X5A ANEMIA ASSOCIATED WITH CHEMOTHERAPY: ICD-10-CM

## 2025-05-15 DIAGNOSIS — T45.1X5A IMMUNODEFICIENCY DUE TO CHEMOTHERAPY: ICD-10-CM

## 2025-05-15 DIAGNOSIS — C18.9 METASTATIC COLON CANCER TO LIVER: ICD-10-CM

## 2025-05-15 DIAGNOSIS — K59.03 DRUG-INDUCED CONSTIPATION: ICD-10-CM

## 2025-05-15 DIAGNOSIS — Z79.69 IMMUNODEFICIENCY DUE TO CHEMOTHERAPY: ICD-10-CM

## 2025-05-15 DIAGNOSIS — I95.9 HYPOTENSION, UNSPECIFIED HYPOTENSION TYPE: ICD-10-CM

## 2025-05-15 DIAGNOSIS — D49.9 IMMUNODEFICIENCY SECONDARY TO NEOPLASM: ICD-10-CM

## 2025-05-15 DIAGNOSIS — D64.81 ANEMIA ASSOCIATED WITH CHEMOTHERAPY: ICD-10-CM

## 2025-05-15 DIAGNOSIS — E86.0 DEHYDRATION: ICD-10-CM

## 2025-05-15 DIAGNOSIS — R16.0 LIVER MASSES: ICD-10-CM

## 2025-05-15 DIAGNOSIS — C78.7 METASTATIC COLON CANCER TO LIVER: ICD-10-CM

## 2025-05-15 DIAGNOSIS — T45.1X5A CHEMOTHERAPY INDUCED NEUTROPENIA: ICD-10-CM

## 2025-05-15 DIAGNOSIS — D84.81 IMMUNODEFICIENCY SECONDARY TO NEOPLASM: ICD-10-CM

## 2025-05-15 DIAGNOSIS — D70.1 CHEMOTHERAPY INDUCED NEUTROPENIA: ICD-10-CM

## 2025-05-15 DIAGNOSIS — R39.9 LOWER URINARY TRACT SYMPTOMS (LUTS): ICD-10-CM

## 2025-05-15 LAB
ABSOLUTE EOSINOPHIL (OHS): 0.02 K/UL
ABSOLUTE MONOCYTE (OHS): 0.63 K/UL (ref 0.3–1)
ABSOLUTE NEUTROPHIL COUNT (OHS): 2.23 K/UL (ref 1.8–7.7)
ALBUMIN SERPL BCP-MCNC: 2.3 G/DL (ref 3.5–5.2)
ALP SERPL-CCNC: 351 UNIT/L (ref 40–150)
ALT SERPL W/O P-5'-P-CCNC: 15 UNIT/L (ref 10–44)
ANION GAP (OHS): 8 MMOL/L (ref 8–16)
AST SERPL-CCNC: 45 UNIT/L (ref 11–45)
BASOPHILS # BLD AUTO: 0.01 K/UL
BASOPHILS NFR BLD AUTO: 0.3 %
BILIRUB SERPL-MCNC: 2.1 MG/DL (ref 0.1–1)
BUN SERPL-MCNC: 10 MG/DL (ref 8–23)
CALCIUM SERPL-MCNC: 8.5 MG/DL (ref 8.7–10.5)
CARCINOEMBRYONIC ANTIGEN (OHS): 609.6 NG/ML
CHLORIDE SERPL-SCNC: 106 MMOL/L (ref 95–110)
CO2 SERPL-SCNC: 26 MMOL/L (ref 23–29)
CREAT SERPL-MCNC: 0.6 MG/DL (ref 0.5–1.4)
ERYTHROCYTE [DISTWIDTH] IN BLOOD BY AUTOMATED COUNT: 18.5 % (ref 11.5–14.5)
GFR SERPLBLD CREATININE-BSD FMLA CKD-EPI: >60 ML/MIN/1.73/M2
GLUCOSE SERPL-MCNC: 97 MG/DL (ref 70–110)
HCT VFR BLD AUTO: 32.3 % (ref 40–54)
HGB BLD-MCNC: 9.8 GM/DL (ref 14–18)
IMM GRANULOCYTES # BLD AUTO: 0.06 K/UL (ref 0–0.04)
IMM GRANULOCYTES NFR BLD AUTO: 1.6 % (ref 0–0.5)
LYMPHOCYTES # BLD AUTO: 0.7 K/UL (ref 1–4.8)
MCH RBC QN AUTO: 27.7 PG (ref 27–31)
MCHC RBC AUTO-ENTMCNC: 30.3 G/DL (ref 32–36)
MCV RBC AUTO: 91 FL (ref 82–98)
NUCLEATED RBC (/100WBC) (OHS): 0 /100 WBC
PLATELET # BLD AUTO: 228 K/UL (ref 150–450)
PMV BLD AUTO: 9.9 FL (ref 9.2–12.9)
POTASSIUM SERPL-SCNC: 3.6 MMOL/L (ref 3.5–5.1)
PROT SERPL-MCNC: 6.3 GM/DL (ref 6–8.4)
RBC # BLD AUTO: 3.54 M/UL (ref 4.6–6.2)
RELATIVE EOSINOPHIL (OHS): 0.5 %
RELATIVE LYMPHOCYTE (OHS): 19.2 % (ref 18–48)
RELATIVE MONOCYTE (OHS): 17.3 % (ref 4–15)
RELATIVE NEUTROPHIL (OHS): 61.1 % (ref 38–73)
SODIUM SERPL-SCNC: 140 MMOL/L (ref 136–145)
WBC # BLD AUTO: 3.65 K/UL (ref 3.9–12.7)

## 2025-05-15 PROCEDURE — 99215 OFFICE O/P EST HI 40 MIN: CPT | Mod: HCNC,S$GLB,, | Performed by: REGISTERED NURSE

## 2025-05-15 PROCEDURE — 85025 COMPLETE CBC W/AUTO DIFF WBC: CPT | Mod: HCNC

## 2025-05-15 PROCEDURE — 36415 COLL VENOUS BLD VENIPUNCTURE: CPT | Mod: HCNC

## 2025-05-15 PROCEDURE — 1101F PT FALLS ASSESS-DOCD LE1/YR: CPT | Mod: CPTII,HCNC,S$GLB, | Performed by: REGISTERED NURSE

## 2025-05-15 PROCEDURE — 1159F MED LIST DOCD IN RCRD: CPT | Mod: CPTII,HCNC,S$GLB, | Performed by: REGISTERED NURSE

## 2025-05-15 PROCEDURE — 1160F RVW MEDS BY RX/DR IN RCRD: CPT | Mod: CPTII,HCNC,S$GLB, | Performed by: REGISTERED NURSE

## 2025-05-15 PROCEDURE — 80053 COMPREHEN METABOLIC PANEL: CPT | Mod: HCNC

## 2025-05-15 PROCEDURE — 3078F DIAST BP <80 MM HG: CPT | Mod: CPTII,HCNC,S$GLB, | Performed by: REGISTERED NURSE

## 2025-05-15 PROCEDURE — 3074F SYST BP LT 130 MM HG: CPT | Mod: CPTII,HCNC,S$GLB, | Performed by: REGISTERED NURSE

## 2025-05-15 PROCEDURE — 1125F AMNT PAIN NOTED PAIN PRSNT: CPT | Mod: CPTII,HCNC,S$GLB, | Performed by: REGISTERED NURSE

## 2025-05-15 PROCEDURE — 99999 PR PBB SHADOW E&M-EST. PATIENT-LVL IV: CPT | Mod: PBBFAC,HCNC,, | Performed by: REGISTERED NURSE

## 2025-05-15 PROCEDURE — 3288F FALL RISK ASSESSMENT DOCD: CPT | Mod: CPTII,HCNC,S$GLB, | Performed by: REGISTERED NURSE

## 2025-05-15 PROCEDURE — 82378 CARCINOEMBRYONIC ANTIGEN: CPT | Mod: HCNC

## 2025-05-15 PROCEDURE — 3008F BODY MASS INDEX DOCD: CPT | Mod: CPTII,HCNC,S$GLB, | Performed by: REGISTERED NURSE

## 2025-05-15 PROCEDURE — G2211 COMPLEX E/M VISIT ADD ON: HCPCS | Mod: HCNC,S$GLB,, | Performed by: REGISTERED NURSE

## 2025-05-15 RX ORDER — OXYCODONE AND ACETAMINOPHEN 5; 325 MG/1; MG/1
1 TABLET ORAL EVERY 6 HOURS PRN
Qty: 90 TABLET | Refills: 0 | Status: SHIPPED | OUTPATIENT
Start: 2025-05-15

## 2025-05-15 NOTE — PROGRESS NOTES
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 Bonilla  12/30/2022: Y-90 to R liver  1/27/2023: Y-90 to R liver  5/17/2023: Y-90 to R liver  7/11/2023: C1 FOLFIRI + Pema  7/26/2023: C2 FOLFIRI + Pema  8/8/2023: C3 FOLFIRI + Pema  8/22/23: C4 FOLFIRI + Pema  Restaging CT CAP 9/1/23: Good response to chemo.  9/7/23: C5 FOLFIRI + Pema  ......................................  Restaging CT CAP 10/12/23: Good response to chemo  10/16/23: C8 FOLFIRI + Pema  .......................................  Restaging CT CAP 12/8/23: Good response to chemo  12/11/23: C12 FOLFIRI + Pema  .......................................  Restaging CT CAP 2/5/24: Good response to chemo  2/8/24: C16 FOLFIRI + Pema    Restaging CT CAP 4/26/24: Good response to chemo  Restaging CT CAP 6/6/24: Good response to chemo  Restaging CT CAP 8/1/24: Stable disease  Restaging CT CAP 10/11/24: Progression of R liver lobe mass.  Plan for Y-90 to this.  Restaging CT CAP 12/20/24: No new extrahepatic disease.  Plan for MWA to left lobe lesion.  Restaging CT CAP 3/18/25: Disease progression in liver.    Started Lonsurf 3/24/2025.    Interval History:   He presents today while on single agent Lonsurf. He has experienced worsening midline abdominal pain over the last week. Back pain persists  as well. Swelling to lower extremities has worsened, equal bilaterally but limiting activity d/t pain. Has been elevating legs which helps slightly, but not as much as prior. Cannot tolerate compression socks as feels they are too tight on him. He does get fatigued and SOB with exertion. Recovers with rest. Otherwise, bowels stable and appetite fluctuates.     ECOG status is 1. Presents with his daughter today.    Past Medical History:   Past Medical History:   Diagnosis Date    MARIA A (acute kidney injury) 2022    Hypertension     Hypotension 2025    Metastatic colon cancer to liver 2021     Past Surgical HIstory:   Past Surgical History:   Procedure Laterality Date    COLONOSCOPY N/A 2021    Procedure: COLONOSCOPY with possible stent;  Surgeon: EDILMA Bonilla MD;  Location: [facility] (2ND FLR);  Service: Colon and Rectal;  Laterality: N/A;    COLONOSCOPY N/A 11/3/2023    Procedure: COLONOSCOPY;  Surgeon: EDILMA Bonilla MD;  Location: [facility] (4TH FLR);  Service: Endoscopy;  Laterality: N/A;  prep instructions sent to pt via portal  From: EDILMA Bonilla MD  Procedure: Colonoscopy  Diagnosis: Surveillance colonoscopy - Hx of colon cancer  Procedure Timin-12 weeks  Provider: Myself  Location: [address]  Additional Scheduling Information: No scheduling con    COLONOSCOPY N/A 7/10/2024    Procedure: COLONOSCOPY;  Surgeon: Tam Pantoja MD;  Location: [facility] (2ND FLR);  Service: Endoscopy;  Laterality: N/A;    DIAGNOSTIC LAPAROSCOPY N/A 2022    Procedure: LAPAROSCOPY, DIAGNOSTIC;  Surgeon: Adrian Rodriguez MD;  [address]  Service: General;  Laterality: N/A;    INSERTION OF TUNNELED CENTRAL VENOUS CATHETER (CVC) WITH SUBCUTANEOUS PORT N/A 5/3/2021    Procedure: [id number];  Surgeon: Francisco Layton MD;  [facility];  Service: Vascular;  Laterality: N/A;    OMENTECTOMY N/A 10/20/2022    Procedure: OMENTECTOMY;  Surgeon: EDILMA Bonilla MD;   Location: [facility] OR 2ND FLR;  Service: Colon and Rectal;  Laterality: N/A;    RIGHT HEMICOLECTOMY N/A 10/20/2022    Procedure: HEMICOLECTOMY, RIGHT, extended;  Surgeon: EDILMA Bonilla MD;  Location: [facility] OR 2ND FLR;  Service: Colon and Rectal;  Laterality: N/A;  Extended right hemicolectomy CONSENT IN AM     Family History:   Family History   Problem Relation Name Age of Onset    Cancer Brother         Social History:  reports that he has never smoked. He has never used smokeless tobacco. He reports that he does not currently use alcohol. He reports that he does not use drugs.    Allergies:  Review of patient's allergies indicates:  No Known Allergies    Medications:  Current Outpatient Medications   Medication Sig Dispense Refill    acetaminophen (TYLENOL) 500 MG tablet Take 1 tablet (500 mg total) by mouth every 6 (six) hours as needed for Pain (alternate with ibuprofen).  0    amLODIPine (NORVASC) 10 MG tablet Take 1 tablet (10 mg total) by mouth once daily. 90 tablet 3    amoxicillin-clavulanate 875-125mg (AUGMENTIN) 875-125 mg per tablet Take 1 tablet by mouth 2 (two) times daily. (Patient not taking: Reported on 4/2/2025) 14 tablet 0    LIDOcaine-prilocaine (EMLA) cream Apply topically as needed (port application). 30 g 3    ondansetron (ZOFRAN) 4 MG tablet Take 1 tablet (4 mg total) by mouth every 8 (eight) hours as needed for Nausea. 30 tablet 3    ondansetron (ZOFRAN) 4 MG tablet Take 1 tablet (4 mg total) by mouth every 8 (eight) hours as needed for Nausea. 30 tablet 0    OPW TEST CLAIM - DO NOT FILL OPW test claim. Do not fill. 1 tablet 0    oxyCODONE-acetaminophen (PERCOCET) 5-325 mg per tablet Take 1 tablet by mouth every 6 (six) hours as needed for Pain. 60 tablet 0    pantoprazole (PROTONIX) 40 MG tablet Take 1 tablet (40 mg total) by mouth 2 (two) times daily before meals. 180 tablet 3    tamsulosin (FLOMAX) 0.4 mg Cap Take 1 capsule (0.4 mg total) by mouth once daily. 30 capsule 5    traMADoL  (ULTRAM) 50 mg tablet Take 1 tablet (50 mg total) by mouth every 6 (six) hours as needed for Pain. (Patient not taking: Reported on 4/2/2025) 30 tablet 0    trifluridine-tipiraciL (LONSURF) 15-6.14 mg Tab Take 3 tablets (45 mg) by mouth twice daily on days 1 through 5 of each 14 day cycle. 60 tablet 5     No current facility-administered medications for this visit.     Review of Systems   Constitutional:  Positive for activity change and fatigue. Negative for appetite change, chills, diaphoresis, fever and unexpected weight change.   HENT:  Negative for voice change.    Eyes:  Negative for visual disturbance.   Respiratory:  Positive for shortness of breath (exertional). Negative for cough.    Cardiovascular:  Positive for leg swelling. Negative for chest pain and palpitations.   Gastrointestinal:  Negative for abdominal pain, blood in stool, constipation, diarrhea, nausea and vomiting.   Genitourinary:  Negative for hematuria.   Musculoskeletal:  Negative for arthralgias and back pain.   Skin:  Negative for wound.   Neurological:  Positive for weakness (generalized). Negative for dizziness and numbness.   Psychiatric/Behavioral:  Negative for confusion and sleep disturbance. The patient is not nervous/anxious.    All other systems reviewed and are negative.    ECOG Performance Status: 1     Objective:      Vitals:   Vitals:    05/15/25 1054   BP: 112/74   BP Location: Right arm   Patient Position: Sitting   Pulse: 95   Resp: 18   Temp: 97.9 °F (36.6 °C)   TempSrc: Oral   SpO2: 100%   Weight: 51.4 kg (113 lb 3.3 oz)       Physical Exam  Vitals reviewed.   Constitutional:       General: He is not in acute distress.     Comments: Chronically ill appearing   HENT:      Head: Normocephalic and atraumatic.      Right Ear: External ear normal.      Left Ear: External ear normal.      Nose: Nose normal.      Mouth/Throat:      Pharynx: Oropharynx is clear.   Eyes:      General: No scleral icterus.     Extraocular  Movements: Extraocular movements intact.      Conjunctiva/sclera: Conjunctivae normal.   Cardiovascular:      Rate and Rhythm: Normal rate and regular rhythm.      Pulses: Normal pulses.      Heart sounds: Normal heart sounds. No murmur heard.  Pulmonary:      Effort: Pulmonary effort is normal. No respiratory distress.      Breath sounds: Normal breath sounds. No wheezing.   Chest:      Comments: Port to RCW, no signs of infection.  Abdominal:      General: Abdomen is flat. Bowel sounds are normal. There is distension (mild).      Palpations: Abdomen is soft. There is no mass.      Tenderness: There is no abdominal tenderness.      Comments: Vertical midline abdominal wound well healed   Musculoskeletal:         General: No swelling or deformity. Normal range of motion.      Right lower leg: Edema present.      Left lower leg: Edema present.   Skin:     Coloration: Skin is not jaundiced or pale.      Findings: No bruising, erythema or rash.   Neurological:      General: No focal deficit present.      Mental Status: He is alert and oriented to person, place, and time. Mental status is at baseline.      Cranial Nerves: No cranial nerve deficit.      Sensory: No sensory deficit.      Gait: Gait abnormal (in wheelchair).   Psychiatric:         Mood and Affect: Mood normal.         Behavior: Behavior normal.         Thought Content: Thought content normal.         Judgment: Judgment normal.       Laboratory Data:  Lab Visit on 05/15/2025   Component Date Value Ref Range Status    Sodium 05/15/2025 140  136 - 145 mmol/L Final    Potassium 05/15/2025 3.6  3.5 - 5.1 mmol/L Final    Chloride 05/15/2025 106  95 - 110 mmol/L Final    CO2 05/15/2025 26  23 - 29 mmol/L Final    Glucose 05/15/2025 97  70 - 110 mg/dL Final    BUN 05/15/2025 10  8 - 23 mg/dL Final    Creatinine 05/15/2025 0.6  0.5 - 1.4 mg/dL Final    Calcium 05/15/2025 8.5 (L)  8.7 - 10.5 mg/dL Final    Protein Total 05/15/2025 6.3  6.0 - 8.4 gm/dL Final     Albumin 05/15/2025 2.3 (L)  3.5 - 5.2 g/dL Final    Bilirubin Total 05/15/2025 2.1 (H)  0.1 - 1.0 mg/dL Final    For infants and newborns, interpretation of results should be based   on gestational age, weight and in agreement with clinical   observations.    Premature Infant recommended reference ranges:   0-24 hours:  <8.0 mg/dL   24-48 hours: <12.0 mg/dL   3-5 days:    <15.0 mg/dL   6-29 days:   <15.0 mg/dL    ALP 05/15/2025 351 (H)  40 - 150 unit/L Final    AST 05/15/2025 45  11 - 45 unit/L Final    ALT 05/15/2025 15  10 - 44 unit/L Final    Anion Gap 05/15/2025 8  8 - 16 mmol/L Final    eGFR 05/15/2025 >60  >60 mL/min/1.73/m2 Final    Estimated GFR calculated using the CKD-EPI creatinine (2021) equation.    WBC 05/15/2025 3.65 (L)  3.90 - 12.70 K/uL Final    RBC 05/15/2025 3.54 (L)  4.60 - 6.20 M/uL Final    HGB 05/15/2025 9.8 (L)  14.0 - 18.0 gm/dL Final    HCT 05/15/2025 32.3 (L)  40.0 - 54.0 % Final    MCV 05/15/2025 91  82 - 98 fL Final    MCH 05/15/2025 27.7  27.0 - 31.0 pg Final    MCHC 05/15/2025 30.3 (L)  32.0 - 36.0 g/dL Final    RDW 05/15/2025 18.5 (H)  11.5 - 14.5 % Final    Platelet Count 05/15/2025 228  150 - 450 K/uL Final    MPV 05/15/2025 9.9  9.2 - 12.9 fL Final    Nucleated RBC 05/15/2025 0  <=0 /100 WBC Final    Neut % 05/15/2025 61.1  38 - 73 % Final    Lymph % 05/15/2025 19.2  18 - 48 % Final    Mono % 05/15/2025 17.3 (H)  4 - 15 % Final    Eos % 05/15/2025 0.5  <=8 % Final    Basophil % 05/15/2025 0.3  <=1.9 % Final    Imm Grans % 05/15/2025 1.6 (H)  0.0 - 0.5 % Final    Neut # 05/15/2025 2.23  1.8 - 7.7 K/uL Final    Lymph # 05/15/2025 0.70 (L)  1 - 4.8 K/uL Final    Mono # 05/15/2025 0.63  0.3 - 1 K/uL Final    Eos # 05/15/2025 0.02  <=0.5 K/uL Final    Baso # 05/15/2025 0.01  <=0.2 K/uL Final    Imm Grans # 05/15/2025 0.06 (H)  0.00 - 0.04 K/uL Final    Mild elevation in immature granulocytes is non specific and can be seen in a variety of conditions including stress response, acute  inflammation, trauma and pregnancy. Correlation with other laboratory and clinical findings is essential.       Assessment and Plan        1. Metastatic colon cancer to liver    2. Immunodeficiency secondary to neoplasm    3. Immunodeficiency due to chemotherapy    4. Anemia associated with chemotherapy    5. Lower GI bleed    6. Chemotherapy induced neutropenia    7. Neoplasm related pain    8. Weight decrease    9. Drug-induced constipation    10. Severe malnutrition    11. Hypotension, unspecified hypotension type    12. Lower urinary tract symptoms (LUTS)    13. Dehydration        1-6.  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  Guardant 360 was negative for BRAF, VIRI, HER2 amplification.   Pretreatment CEA 57.  We had a long and sonia discussion with him about his diagnosis. Unfortunately, the disease is not curable but remains treatable and he has good performance status.   Restaging scans after 7 cycles of FOLFOX + Pema showed significant reduction in colonic mass and liver mass.   we switched to maintenance as of cycle 8 with 5FU + Pema  Restaging scans from March 2022 show stable disease.   MRI on 7/18/22 showing hepatic progression of disease in the right hepatic lobe noted on the exam. CT CAP on 8/26 shows some mild progression in both liver metastases.    Discussed case at colorectal tumor board 8/31/22 and had a diagnostic lap performed by Dr. Rodriguez on 9/7 to assess for any occult peritoneal disease. Findings from the diagnostic lap were negative. Fluid from around from around the primary mass was sent for cytology that was negative for malignancy.   Underwent primary tumor resection on 10/20/22 with Dr. Bonilla.    Meanwhile his liver mets had grown.  We discussed his case at [facility] conference with plans for short term Y-90 and then restarting chemotherapy prior to considering any surgical options to his liver metastases.  He underwent Y-90 delivery on 12/30/22.  Tolerated well.   CT CAP on 1/23/23 reviewed at CRC conference with good response to Y-90, no new lesions.  Underwent second Y-90 on 1/27/23. Can consider R hepatectomy pending follow-up imaging after Y-90.     Received cycle 42 of FOLFOX (omitted oxaliplatin again starting cycle 41 due to neuropathy) on 2/9/23.  Because of 5-FU shortage nationally, we have been holding chemotherapy since mid-February 2023.  If he needs to restart chemotherapy (I.e. no plans for surgical resection), will place him on capecitabine maintenance for duration of 5-FU shortage.     Discussed at colorectal liver mets tumor board 3/16/23.  Plan for repeat Y-90 and then consideration of surgical resection and he will meet with liver transplant team as well.  Underwent Y-90 delivery 5/17/23 with IR.     Has been on maintenance Xeloda since late April 2023.    Met with liver transplant team but ultimately opted not to proceed with transplant as he was concerned about how he would tolerate a major surgery with the life changes that would come after transplant as well. They closed out his case.    He met with Dr. Rodriguez to discuss whether surgical resection is indicated for his liver mets.  He recommended repeat MRI.  Repeat MRI unfortunately showed disease progression while on Xeloda maintenance.  Similarly his CEA garrett precipitously, all in keeping with worsening disease.    We recommended we restart IV chemotherapy with FOLFIRI + Avastin (never had irinotecan).    Because of hyperbilirubinemia he received cycle 1 with just 5-FU + Avastin on 7/11/23. Tolerated this well. Bilirubin has improved.  Received irinotecan starting with cycle 2.  Repeat CT CAP after cycle 4 shows good response to therapy.   Excellent tolerance. [facility] tumor board agrees with continuation of chemotherapy.   Repeat CT CAP after cycle 7 shows stable disease, no evidence of new or worsening disease.   Repeat CT CAP after cycle 11 shows stable disease.   Repeat CT CAP  after cycle 15 shows improved disease.  Repeat CT CAP after cycle 21 shows stable disease.   Repeat CT CAP after cycle 24 shows stable disease.    Hospitalized after cycle 26 and 27 (without Avastin) for hematochezia.  Colonoscopy with likely diverticular bleed.  Required 2 units of blood in last month.  Will continue to hold bevacizumab indefinitely.  Discussed that we don't have a very good way preventing this recurrence and chemo with its associated thrombocytopenia may make this more likely to occur again.  He still wishes to proceed, which is my recommendation as well.    Repeat CT CAP after cycle 28 shows stable disease and recc to continue with chemotherapy.   Repeat CT CAP after cycle 32 shows progression of R liver lobe metastasis with rising CEA.  Will plan for Y-90 to growing lesion.  Underwent mapping 11/5/24 and delivery 11/21/24.  CT CAP 12/20/24 shows otherwise stable disease.    Underwent L lobe MWA per Dr. Vidal on 2/10/25.     Will continue FOLFIRI in the meantime.    Delayed cycle 33 due to neutropenia - has since recovered and on Neulasta.  CT CAP after cycle 40 shows disease progression in liver.  He will be seeing Dr. Vidal for potential Y90 though unclear if his liver can tolerate more.    We discussed switching systemic therapy. Offered recycle of FOLFOX vs Lonsurf vs best supportive care.  He has opted for Lonsurf. He started on 3/24/2025 and has done well.     - Labs reviewed and adequate for treatment. Bilirubin increased to 2.1. Monitor trend.   - CEA again increased. Will get repeat scan.   - Proceed with Lonsurf day 1 on Monday.     RTC in 2 weeks for toxicity check with repeat scans.     Tempus xT: APC, CKS1B cng, ERCC3 cnl, PIK3CA; KENIA, TMB 12.1.  Tempus xF: APC, KRAS Q61H, PIK3CA, TP53; KENIA    7-10. Neoplasm related pain, weight loss, constipation, malnutrition  -- Pain recently worsened. Refilled Percocet today.   -- Following with nutrition. Encouraged increased protein.  Monitor.  Weight stable.  -- Continue Miralax as needed for constipation. Doing well with this.     11. Hypotension   -- BP normal today.   -- Following with PCP.   -- Encouraged him and his daughter to monitor BP and HR closely at home.     12-13. Urinary Hesitancy/dehydration  -- Continue Flomax.   -- Reported improvement since starting medication.     RTC in 2 weeks    Patient is in agreement with the proposed treatment plan. All questions were answered to the patient's satisfaction. Pt knows to call clinic if anything is needed before the next clinic visit.    Patient discussed with collaborating physician, Dr. Schuster.    At least 40 minutes were spent today on this encounter including face to face time with the patient, data gathering/interpretation and documentation.       Natividad Maher, MSN, APRN, [facility]  Hematology and Medical Oncology  Clinical Nurse Specialist to Dr. Schuster, Dr. Quijano & Dr. Mueller         code applied: patient requires or will require a continuous, longitudinal, and active collaborative plan of care related to this patient's health condition, colon cancer --the management of which requires the direction of a practitioner with specialized clinical knowledge, skill, and expertise.     Route Chart for Scheduling    Med Onc Chart Routing  Urgent    Follow up with physician 2 weeks. with labs and scans to see Dr. Schuster for tox check and scan review/treatment discussion - please change from RACHEL to MD   Follow up with RACHEL    Infusion scheduling note    Injection scheduling note    Labs    Imaging CT chest abdomen pelvis   1-2 days prior to MD visit in 2 weeks   Pharmacy appointment    Other referrals

## 2025-05-28 ENCOUNTER — HOSPITAL ENCOUNTER (OUTPATIENT)
Dept: RADIOLOGY | Facility: HOSPITAL | Age: 75
Discharge: HOME | End: 2025-05-28
Attending: REGISTERED NURSE
Payer: MEDICARE

## 2025-05-28 DIAGNOSIS — C78.7 METASTATIC COLON CANCER TO LIVER: ICD-10-CM

## 2025-05-28 DIAGNOSIS — C18.9 METASTATIC COLON CANCER TO LIVER: ICD-10-CM

## 2025-05-28 PROCEDURE — 71260 CT THORAX DX C+: CPT | Mod: 26,HCNC,, | Performed by: RADIOLOGY

## 2025-05-28 PROCEDURE — 74177 CT ABD & PELVIS W/CONTRAST: CPT | Mod: TC,HCNC

## 2025-05-28 PROCEDURE — 25500020 PHARM REV CODE 255: Mod: HCNC | Performed by: REGISTERED NURSE

## 2025-05-28 PROCEDURE — A9698 NON-RAD CONTRAST MATERIALNOC: HCPCS | Mod: HCNC | Performed by: REGISTERED NURSE

## 2025-05-28 PROCEDURE — 74177 CT ABD & PELVIS W/CONTRAST: CPT | Mod: 26,HCNC,, | Performed by: RADIOLOGY

## 2025-05-28 RX ADMIN — IOHEXOL 75 ML: 350 INJECTION, SOLUTION INTRAVENOUS at 05:05

## 2025-05-28 RX ADMIN — BARIUM SULFATE 450 ML: 20 SUSPENSION ORAL at 05:05

## 2025-05-28 NOTE — PROGRESS NOTES
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 Bonilla  12/30/2022: Y-90 to R liver  1/27/2023: Y-90 to R liver  5/17/2023: Y-90 to R liver  7/11/2023: C1 FOLFIRI + Pema  7/26/2023: C2 FOLFIRI + Pema  8/8/2023: C3 FOLFIRI + Pema  8/22/23: C4 FOLFIRI + Pema  Restaging CT CAP 9/1/23: Good response to chemo.  9/7/23: C5 FOLFIRI + Pema  ......................................  Restaging CT CAP 10/12/23: Good response to chemo  10/16/23: C8 FOLFIRI + Pema  .......................................  Restaging CT CAP 12/8/23: Good response to chemo  12/11/23: C12 FOLFIRI + Pema  .......................................  Restaging CT CAP 2/5/24: Good response to chemo  2/8/24: C16 FOLFIRI + Pema    Restaging CT CAP 4/26/24: Good response to chemo  Restaging CT CAP 6/6/24: Good response to chemo  Restaging CT CAP 8/1/24: Stable disease  Restaging CT CAP 10/11/24: Progression of R liver lobe mass.  Plan for Y-90 to this.  Restaging CT CAP 12/20/24: No new extrahepatic disease.  Plan for MWA to left lobe lesion.  Restaging CT CAP 3/18/25: Disease progression in liver.    Started Lonsurf 3/24/2025.    Interval History:   He presents today while on single agent Lonsurf. CT CAP with findings concerning for progressed metastatic disease including multiple new  hepatic lesions and pulmonary nodules. Associated new/more occlusion of the left portal vein as well as compression or involvement of the IVC. New enlarged pericaval and mesenteric lymph nodes. Enlarged hypodensity near the pancreatic head, possible enlarging peripancreatic nodes. Extensive portal venous thrombosis with findings of portal hypertension including abdominal collateral vasculature (including gastroesophageal varices) as well as moderate volume abdominopelvic ascites.     He has progression of abdominal distension, lower extremity, scleral icterus and worsened appetite.  His pain is somewhat better controlled with Percocet sent last visit.      ECOG status is 2. Presents with his daughter today.    Past Medical History:   Past Medical History:   Diagnosis Date    MARIA A (acute kidney injury) 2022    Hypertension     Hypotension 2025    Metastatic colon cancer to liver 2021     Past Surgical HIstory:   Past Surgical History:   Procedure Laterality Date    COLONOSCOPY N/A 2021    Procedure: COLONOSCOPY with possible stent;  Surgeon: EDILMA Bonilla MD;  Location: [facility] (2ND FLR);  Service: Colon and Rectal;  Laterality: N/A;    COLONOSCOPY N/A 11/3/2023    Procedure: COLONOSCOPY;  Surgeon: EDILMA Bonilla MD;  Location: [facility] (4TH FLR);  Service: Endoscopy;  Laterality: N/A;  prep instructions sent to pt via portal  From: EDILMA Bonilla MD  Procedure: Colonoscopy  Diagnosis: Surveillance colonoscopy - Hx of colon cancer  Procedure Timin-12 weeks  Provider: Myself  Location: [address]  Additional Scheduling Information: No scheduling con    COLONOSCOPY N/A 7/10/2024    Procedure: COLONOSCOPY;  Surgeon: Tam Pantoja MD;  Location: [facility] (2ND FLR);  Service: Endoscopy;  Laterality: N/A;    DIAGNOSTIC LAPAROSCOPY N/A 2022    Procedure: LAPAROSCOPY, DIAGNOSTIC;  Surgeon: Adrian Rodriguez MD;  Location: [facility] OR 2ND FLR;  Service: General;  Laterality: N/A;     INSERTION OF TUNNELED CENTRAL VENOUS CATHETER (CVC) WITH SUBCUTANEOUS PORT N/A 5/3/2021    Procedure: [id number];  Surgeon: Francisco Layton MD;  Location: [facility] OR 2ND FLR;  Service: Vascular;  Laterality: N/A;    OMENTECTOMY N/A 10/20/2022    Procedure: OMENTECTOMY;  Surgeon: EDILMA Bonilla MD;  Location: [facility] OR 2ND FLR;  Service: Colon and Rectal;  Laterality: N/A;    RIGHT HEMICOLECTOMY N/A 10/20/2022    Procedure: HEMICOLECTOMY, RIGHT, extended;  Surgeon: EDILMA Bonilla MD;  Location: [facility] OR 2ND FLR;  Service: Colon and Rectal;  Laterality: N/A;  Extended right hemicolectomy CONSENT IN AM     Family History:   Family History   Problem Relation Name Age of Onset    Cancer Brother         Social History:  reports that he has never smoked. He has never used smokeless tobacco. He reports that he does not currently use alcohol. He reports that he does not use drugs.    Allergies:  Review of patient's allergies indicates:  No Known Allergies    Medications:  Current Outpatient Medications   Medication Sig Dispense Refill    acetaminophen (TYLENOL) 500 MG tablet Take 1 tablet (500 mg total) by mouth every 6 (six) hours as needed for Pain (alternate with ibuprofen).  0    amLODIPine (NORVASC) 10 MG tablet Take 1 tablet (10 mg total) by mouth once daily. 90 tablet 3    LIDOcaine-prilocaine (EMLA) cream Apply topically as needed (port application). 30 g 3    ondansetron (ZOFRAN) 4 MG tablet Take 1 tablet (4 mg total) by mouth every 8 (eight) hours as needed for Nausea. 30 tablet 3    ondansetron (ZOFRAN) 4 MG tablet Take 1 tablet (4 mg total) by mouth every 8 (eight) hours as needed for Nausea. 30 tablet 0    OPW TEST CLAIM - DO NOT FILL OPW test claim. Do not fill. 1 tablet 0    oxyCODONE-acetaminophen (PERCOCET) 5-325 mg per tablet Take 1 tablet by mouth every 6 (six) hours as needed for Pain. 90 tablet 0    pantoprazole (PROTONIX) 40 MG tablet Take 1 tablet (40 mg total) by mouth 2 (two) times daily  "before meals. 180 tablet 3    tamsulosin (FLOMAX) 0.4 mg Cap Take 1 capsule (0.4 mg total) by mouth once daily. 30 capsule 5    trifluridine-tipiraciL (LONSURF) 15-6.14 mg Tab Take 3 tablets (45 mg) by mouth twice daily on days 1 through 5 of each 14 day cycle. 60 tablet 5    amoxicillin-clavulanate 875-125mg (AUGMENTIN) 875-125 mg per tablet Take 1 tablet by mouth 2 (two) times daily. (Patient not taking: No sig reported) 14 tablet 0    traMADoL (ULTRAM) 50 mg tablet Take 1 tablet (50 mg total) by mouth every 6 (six) hours as needed for Pain. (Patient not taking: Reported on 3/25/2025) 30 tablet 0     No current facility-administered medications for this visit.     Review of Systems   Constitutional:  Positive for activity change and fatigue. Negative for appetite change, chills, diaphoresis, fever and unexpected weight change.   HENT:  Negative for voice change.    Eyes:  Negative for visual disturbance.   Respiratory:  Positive for shortness of breath (exertional). Negative for cough.    Cardiovascular:  Positive for leg swelling. Negative for chest pain and palpitations.   Gastrointestinal:  Positive for abdominal distention and abdominal pain. Negative for blood in stool, constipation, diarrhea, nausea and vomiting.   Genitourinary:  Negative for hematuria.   Musculoskeletal:  Negative for arthralgias and back pain.   Skin:  Negative for wound.   Neurological:  Positive for weakness (generalized). Negative for dizziness and numbness.   Psychiatric/Behavioral:  Negative for confusion and sleep disturbance. The patient is not nervous/anxious.    All other systems reviewed and are negative.         Objective:      Vitals:   Vitals:    05/29/25 1517   BP: 97/69   BP Location: Left arm   Patient Position: Sitting   Pulse: (!) 114   Resp: 20   Temp: 98.8 °F (37.1 °C)   TempSrc: Temporal   SpO2: 100%   Weight: 49.7 kg (109 lb 9.1 oz)   Height: 5' 7" (1.702 m)         Physical Exam  Vitals reviewed.   Constitutional:  "      General: He is not in acute distress.     Comments: Chronically ill appearing   HENT:      Head: Normocephalic and atraumatic.      Right Ear: External ear normal.      Left Ear: External ear normal.      Nose: Nose normal.      Mouth/Throat:      Pharynx: Oropharynx is clear.   Eyes:      General: Scleral icterus present.      Extraocular Movements: Extraocular movements intact.      Conjunctiva/sclera: Conjunctivae normal.   Cardiovascular:      Rate and Rhythm: Regular rhythm. Tachycardia present.   Pulmonary:      Effort: Pulmonary effort is normal. No respiratory distress.   Chest:      Comments: Port to RCW, no signs of infection.  Abdominal:      General: Abdomen is flat. There is distension (mild).      Palpations: Abdomen is soft. There is no mass.      Tenderness: There is no abdominal tenderness.      Comments: Vertical midline abdominal wound well healed   Musculoskeletal:         General: No swelling or deformity. Normal range of motion.      Right lower leg: Edema present.      Left lower leg: Edema present.   Skin:     Coloration: Skin is not jaundiced or pale.      Findings: No bruising, erythema or rash.   Neurological:      General: No focal deficit present.      Mental Status: He is alert and oriented to person, place, and time. Mental status is at baseline.      Cranial Nerves: No cranial nerve deficit.      Sensory: No sensory deficit.      Gait: Gait abnormal (in wheelchair).   Psychiatric:         Mood and Affect: Mood normal.         Behavior: Behavior normal.         Thought Content: Thought content normal.         Judgment: Judgment normal.       Laboratory Data:  Lab Visit on 05/29/2025   Component Date Value Ref Range Status    WBC 05/29/2025 5.19  3.90 - 12.70 K/uL Final    RBC 05/29/2025 3.49 (L)  4.60 - 6.20 M/uL Final    HGB 05/29/2025 9.8 (L)  14.0 - 18.0 gm/dL Final    HCT 05/29/2025 31.2 (L)  40.0 - 54.0 % Final    MCV 05/29/2025 89  82 - 98 fL Final    MCH 05/29/2025 28.1   27.0 - 31.0 pg Final    MCHC 05/29/2025 31.4 (L)  32.0 - 36.0 g/dL Final    RDW 05/29/2025 21.0 (H)  11.5 - 14.5 % Final    Platelet Count 05/29/2025 198  150 - 450 K/uL Final    MPV 05/29/2025 9.8  9.2 - 12.9 fL Final    Nucleated RBC 05/29/2025 0  <=0 /100 WBC Final    Neut % 05/29/2025 66.4  38 - 73 % Final    Lymph % 05/29/2025 14.3 (L)  18 - 48 % Final    Mono % 05/29/2025 18.1 (H)  4 - 15 % Final    Eos % 05/29/2025 0.0  <=8 % Final    Basophil % 05/29/2025 0.4  <=1.9 % Final    Imm Grans % 05/29/2025 0.8 (H)  0.0 - 0.5 % Final    Neut # 05/29/2025 3.45  1.8 - 7.7 K/uL Final    Lymph # 05/29/2025 0.74 (L)  1 - 4.8 K/uL Final    Mono # 05/29/2025 0.94  0.3 - 1 K/uL Final    Eos # 05/29/2025 0.00  <=0.5 K/uL Final    Baso # 05/29/2025 0.02  <=0.2 K/uL Final    Imm Grans # 05/29/2025 0.04  0.00 - 0.04 K/uL Final    Mild elevation in immature granulocytes is non specific and can be seen in a variety of conditions including stress response, acute inflammation, trauma and pregnancy. Correlation with other laboratory and clinical findings is essential.   Lab Visit on 05/28/2025   Component Date Value Ref Range Status    Carcinoembryonic Antigen 05/28/2025 946.5 (H)  <=5.0 ng/mL Final    CEA Normal Range:  Non-Smokers: 0-3.0 ng/mL  Smokers:     0-5.0 ng/mL  The testing method is a chemiluminescent microparticle immunoassay manufactured by [employer] and performed on the i[employer] CRM system. Values obtained with different assay manufacturers for methods may be different and cannot be used interchangeably.    Sodium 05/28/2025 137  136 - 145 mmol/L Final    Potassium 05/28/2025 4.3  3.5 - 5.1 mmol/L Final    Chloride 05/28/2025 101  95 - 110 mmol/L Final    CO2 05/28/2025 26  23 - 29 mmol/L Final    Glucose 05/28/2025 85  70 - 110 mg/dL Final    BUN 05/28/2025 9  8 - 23 mg/dL Final    Creatinine 05/28/2025 0.5  0.5 - 1.4 mg/dL Final    Calcium 05/28/2025 8.6 (L)  8.7 - 10.5 mg/dL Final    Protein  Total 05/28/2025 6.2  6.0 - 8.4 gm/dL Final    Albumin 05/28/2025 2.1 (L)  3.5 - 5.2 g/dL Final    Bilirubin Total 05/28/2025 5.2 (H)  0.1 - 1.0 mg/dL Final    For infants and newborns, interpretation of results should be based   on gestational age, weight and in agreement with clinical   observations.    Premature Infant recommended reference ranges:   0-24 hours:  <8.0 mg/dL   24-48 hours: <12.0 mg/dL   3-5 days:    <15.0 mg/dL   6-29 days:   <15.0 mg/dL    ALP 05/28/2025 347 (H)  40 - 150 unit/L Final    AST 05/28/2025 53 (H)  11 - 45 unit/L Final    ALT 05/28/2025 16  10 - 44 unit/L Final    Anion Gap 05/28/2025 10  8 - 16 mmol/L Final    eGFR 05/28/2025 >60  >60 mL/min/1.73/m2 Final    Estimated GFR calculated using the CKD-EPI creatinine (2021) equation.       Assessment and Plan        1. Metastatic colon cancer to liver    2. Immunodeficiency secondary to neoplasm    3. Immunodeficiency due to chemotherapy    4. Anemia associated with chemotherapy    5. Lower GI bleed    6. Neoplasm related pain    7. Chemotherapy induced neutropenia    8. Weight decrease    9. Drug-induced constipation    10. Severe malnutrition          1-6.  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  Guardant 360 was negative for BRAF, VIRI, HER2 amplification.   Pretreatment CEA 57.  We had a long and sonia discussion with him about his diagnosis. Unfortunately, the disease is not curable but remains treatable and he has good performance status.   Restaging scans after 7 cycles of FOLFOX + Pema showed significant reduction in colonic mass and liver mass.   we switched to maintenance as of cycle 8 with 5FU + Pema  Restaging scans from March 2022 show stable disease.   MRI on 7/18/22 showing hepatic progression of disease in the right hepatic lobe noted on the exam. CT CAP on 8/26 shows some mild progression in both liver metastases.    Discussed case at colorectal tumor board 8/31/22 and had a diagnostic  lap performed by Dr. Rodriguez on 9/7 to assess for any occult peritoneal disease. Findings from the diagnostic lap were negative. Fluid from around from around the primary mass was sent for cytology that was negative for malignancy.   Underwent primary tumor resection on 10/20/22 with Dr. Bonilla.    Meanwhile his liver mets had grown.  We discussed his case at [facility] conference with plans for short term Y-90 and then restarting chemotherapy prior to considering any surgical options to his liver metastases.  He underwent Y-90 delivery on 12/30/22. Tolerated well.   CT CAP on 1/23/23 reviewed at [facility] conference with good response to Y-90, no new lesions.  Underwent second Y-90 on 1/27/23. Can consider R hepatectomy pending follow-up imaging after Y-90.     Received cycle 42 of FOLFOX (omitted oxaliplatin again starting cycle 41 due to neuropathy) on 2/9/23.  Because of 5-FU shortage nationally, we have been holding chemotherapy since mid-February 2023.  If he needs to restart chemotherapy (I.e. no plans for surgical resection), will place him on capecitabine maintenance for duration of 5-FU shortage.     Discussed at colorectal liver mets tumor board 3/16/23.  Plan for repeat Y-90 and then consideration of surgical resection and he will meet with liver transplant team as well.  Underwent Y-90 delivery 5/17/23 with IR.     Has been on maintenance Xeloda since late April 2023.    Met with liver transplant team but ultimately opted not to proceed with transplant as he was concerned about how he would tolerate a major surgery with the life changes that would come after transplant as well. They closed out his case.    He met with Dr. Rodriguez to discuss whether surgical resection is indicated for his liver mets.  He recommended repeat MRI.  Repeat MRI unfortunately showed disease progression while on Xeloda maintenance.  Similarly his CEA garrett precipitously, all in keeping with worsening disease.    We recommended we restart  IV chemotherapy with FOLFIRI + Avastin (never had irinotecan).    Because of hyperbilirubinemia he received cycle 1 with just 5-FU + Avastin on 7/11/23. Tolerated this well. Bilirubin has improved.  Received irinotecan starting with cycle 2.  Repeat CT CAP after cycle 4 shows good response to therapy.   Excellent tolerance. [facility] tumor board agrees with continuation of chemotherapy.   Repeat CT CAP after cycle 7 shows stable disease, no evidence of new or worsening disease.   Repeat CT CAP after cycle 11 shows stable disease.   Repeat CT CAP after cycle 15 shows improved disease.  Repeat CT CAP after cycle 21 shows stable disease.   Repeat CT CAP after cycle 24 shows stable disease.    Hospitalized after cycle 26 and 27 (without Avastin) for hematochezia.  Colonoscopy with likely diverticular bleed.  Required 2 units of blood in last month.  Will continue to hold bevacizumab indefinitely.  Discussed that we don't have a very good way preventing this recurrence and chemo with its associated thrombocytopenia may make this more likely to occur again.  He still wishes to proceed, which is my recommendation as well.    Repeat CT CAP after cycle 28 shows stable disease and recc to continue with chemotherapy.   Repeat CT CAP after cycle 32 shows progression of R liver lobe metastasis with rising CEA.  Will plan for Y-90 to growing lesion.  Underwent mapping 11/5/24 and delivery 11/21/24.  CT CAP 12/20/24 shows otherwise stable disease.    Underwent L lobe MWA per Dr. Vidal on 2/10/25.     Will continue FOLFIRI in the meantime.    Delayed cycle 33 due to neutropenia - has since recovered and on Neulasta.  CT CAP after cycle 40 shows disease progression in liver.  He will be seeing Dr. Vidal for potential Y90 though unclear if his liver can tolerate more.    We discussed switching systemic therapy. Offered recycle of FOLFOX vs Lonsurf vs best supportive care.  He has opted for Lonsurf. He started on 3/24/2025.  CEA has  increased as have LFTs.    Unfortunately his CT CAP 5/28/25 shows disease progression with significant worsening of his disease in the liver and also the lungs.    I do not think that with his liver function and performance status he is a candidate for any further cancer directed therapy.  He is understanding.  Will plan for hospice enrollment.  He is agreeable.  Will have our  help arrange.    Tempus xT: APC, CKS1B cng, ERCC3 cnl, PIK3CA; KENIA, TMB 12.1.  Tempus xF: APC, KRAS Q61H, PIK3CA, TP53; KENIA    7-10. Neoplasm related pain, weight loss, constipation, malnutrition  -- Pain recently worsened. Has Percocet.  Will defer further pain medication to hospice team.   -- Nutrition worsening with disease progression.  -- Continue Miralax as needed for constipation.     Patient is in agreement with the proposed treatment plan. All questions were answered to the patient's satisfaction. Pt knows to call clinic if anything is needed before the next clinic visit.    Bunny Schuster MD  Hematology/Oncology  [facility]       code applied: patient requires or will require a continuous, longitudinal, and active collaborative plan of care related to this patient's health condition, colon cancer --the management of which requires the direction of a practitioner with specialized clinical knowledge, skill, and expertise.     Route Chart for Scheduling    Med Onc Chart Routing      Follow up with physician No follow up needed. can cancel all upcoming appointments; going on hospice   Follow up with RACHEL    Infusion scheduling note    Injection scheduling note    Labs    Imaging    Pharmacy appointment    Other referrals

## 2025-05-29 ENCOUNTER — LAB VISIT (OUTPATIENT)
Dept: LAB | Facility: HOSPITAL | Age: 75
End: 2025-05-29
Attending: INTERNAL MEDICINE
Payer: MEDICARE

## 2025-05-29 ENCOUNTER — OFFICE VISIT (OUTPATIENT)
Dept: HEMATOLOGY/ONCOLOGY | Facility: CLINIC | Age: 75
End: 2025-05-29
Payer: MEDICARE

## 2025-05-29 VITALS
OXYGEN SATURATION: 100 % | TEMPERATURE: 99 F | RESPIRATION RATE: 20 BRPM | SYSTOLIC BLOOD PRESSURE: 97 MMHG | BODY MASS INDEX: 17.2 KG/M2 | HEIGHT: 67 IN | HEART RATE: 114 BPM | DIASTOLIC BLOOD PRESSURE: 69 MMHG | WEIGHT: 109.56 LBS

## 2025-05-29 DIAGNOSIS — T45.1X5A ANEMIA ASSOCIATED WITH CHEMOTHERAPY: ICD-10-CM

## 2025-05-29 DIAGNOSIS — D64.81 ANEMIA ASSOCIATED WITH CHEMOTHERAPY: ICD-10-CM

## 2025-05-29 DIAGNOSIS — C18.9 METASTATIC COLON CANCER TO LIVER: Primary | ICD-10-CM

## 2025-05-29 DIAGNOSIS — E43 SEVERE MALNUTRITION: ICD-10-CM

## 2025-05-29 DIAGNOSIS — C18.9 METASTATIC COLON CANCER TO LIVER: ICD-10-CM

## 2025-05-29 DIAGNOSIS — D70.1 CHEMOTHERAPY INDUCED NEUTROPENIA: ICD-10-CM

## 2025-05-29 DIAGNOSIS — G89.3 NEOPLASM RELATED PAIN: ICD-10-CM

## 2025-05-29 DIAGNOSIS — K59.03 DRUG-INDUCED CONSTIPATION: ICD-10-CM

## 2025-05-29 DIAGNOSIS — C78.7 METASTATIC COLON CANCER TO LIVER: ICD-10-CM

## 2025-05-29 DIAGNOSIS — K92.2 LOWER GI BLEED: ICD-10-CM

## 2025-05-29 DIAGNOSIS — D49.9 IMMUNODEFICIENCY SECONDARY TO NEOPLASM: ICD-10-CM

## 2025-05-29 DIAGNOSIS — D84.81 IMMUNODEFICIENCY SECONDARY TO NEOPLASM: ICD-10-CM

## 2025-05-29 DIAGNOSIS — Z79.69 IMMUNODEFICIENCY DUE TO CHEMOTHERAPY: ICD-10-CM

## 2025-05-29 DIAGNOSIS — D84.821 IMMUNODEFICIENCY DUE TO CHEMOTHERAPY: ICD-10-CM

## 2025-05-29 DIAGNOSIS — C78.7 METASTATIC COLON CANCER TO LIVER: Primary | ICD-10-CM

## 2025-05-29 DIAGNOSIS — T45.1X5A CHEMOTHERAPY INDUCED NEUTROPENIA: ICD-10-CM

## 2025-05-29 DIAGNOSIS — R63.4 WEIGHT DECREASE: ICD-10-CM

## 2025-05-29 DIAGNOSIS — T45.1X5A IMMUNODEFICIENCY DUE TO CHEMOTHERAPY: ICD-10-CM

## 2025-05-29 LAB
ABSOLUTE EOSINOPHIL (OHS): 0 K/UL
ABSOLUTE MONOCYTE (OHS): 0.94 K/UL (ref 0.3–1)
ABSOLUTE NEUTROPHIL COUNT (OHS): 3.45 K/UL (ref 1.8–7.7)
ALBUMIN SERPL BCP-MCNC: 2.1 G/DL (ref 3.5–5.2)
ALP SERPL-CCNC: 332 UNIT/L (ref 40–150)
ALT SERPL W/O P-5'-P-CCNC: 16 UNIT/L (ref 10–44)
ANION GAP (OHS): 8 MMOL/L (ref 8–16)
AST SERPL-CCNC: 47 UNIT/L (ref 11–45)
BASOPHILS # BLD AUTO: 0.02 K/UL
BASOPHILS NFR BLD AUTO: 0.4 %
BILIRUB SERPL-MCNC: 4.5 MG/DL (ref 0.1–1)
BUN SERPL-MCNC: 9 MG/DL (ref 8–23)
CALCIUM SERPL-MCNC: 8.3 MG/DL (ref 8.7–10.5)
CHLORIDE SERPL-SCNC: 103 MMOL/L (ref 95–110)
CO2 SERPL-SCNC: 27 MMOL/L (ref 23–29)
CREAT SERPL-MCNC: 0.6 MG/DL (ref 0.5–1.4)
ERYTHROCYTE [DISTWIDTH] IN BLOOD BY AUTOMATED COUNT: 21 % (ref 11.5–14.5)
GFR SERPLBLD CREATININE-BSD FMLA CKD-EPI: >60 ML/MIN/1.73/M2
GLUCOSE SERPL-MCNC: 91 MG/DL (ref 70–110)
HCT VFR BLD AUTO: 31.2 % (ref 40–54)
HGB BLD-MCNC: 9.8 GM/DL (ref 14–18)
IMM GRANULOCYTES # BLD AUTO: 0.04 K/UL (ref 0–0.04)
IMM GRANULOCYTES NFR BLD AUTO: 0.8 % (ref 0–0.5)
LYMPHOCYTES # BLD AUTO: 0.74 K/UL (ref 1–4.8)
MCH RBC QN AUTO: 28.1 PG (ref 27–31)
MCHC RBC AUTO-ENTMCNC: 31.4 G/DL (ref 32–36)
MCV RBC AUTO: 89 FL (ref 82–98)
NUCLEATED RBC (/100WBC) (OHS): 0 /100 WBC
PLATELET # BLD AUTO: 198 K/UL (ref 150–450)
PMV BLD AUTO: 9.8 FL (ref 9.2–12.9)
POTASSIUM SERPL-SCNC: 4.2 MMOL/L (ref 3.5–5.1)
PROT SERPL-MCNC: 6.1 GM/DL (ref 6–8.4)
RBC # BLD AUTO: 3.49 M/UL (ref 4.6–6.2)
RELATIVE EOSINOPHIL (OHS): 0 %
RELATIVE LYMPHOCYTE (OHS): 14.3 % (ref 18–48)
RELATIVE MONOCYTE (OHS): 18.1 % (ref 4–15)
RELATIVE NEUTROPHIL (OHS): 66.4 % (ref 38–73)
SODIUM SERPL-SCNC: 138 MMOL/L (ref 136–145)
WBC # BLD AUTO: 5.19 K/UL (ref 3.9–12.7)

## 2025-05-29 PROCEDURE — 1125F AMNT PAIN NOTED PAIN PRSNT: CPT | Mod: CPTII,HCNC,S$GLB, | Performed by: INTERNAL MEDICINE

## 2025-05-29 PROCEDURE — 85025 COMPLETE CBC W/AUTO DIFF WBC: CPT | Mod: HCNC

## 2025-05-29 PROCEDURE — G2211 COMPLEX E/M VISIT ADD ON: HCPCS | Mod: HCNC,S$GLB,, | Performed by: INTERNAL MEDICINE

## 2025-05-29 PROCEDURE — 99215 OFFICE O/P EST HI 40 MIN: CPT | Mod: HCNC,S$GLB,, | Performed by: INTERNAL MEDICINE

## 2025-05-29 PROCEDURE — 3008F BODY MASS INDEX DOCD: CPT | Mod: CPTII,HCNC,S$GLB, | Performed by: INTERNAL MEDICINE

## 2025-05-29 PROCEDURE — 36415 COLL VENOUS BLD VENIPUNCTURE: CPT | Mod: HCNC

## 2025-05-29 PROCEDURE — 3288F FALL RISK ASSESSMENT DOCD: CPT | Mod: CPTII,HCNC,S$GLB, | Performed by: INTERNAL MEDICINE

## 2025-05-29 PROCEDURE — 80053 COMPREHEN METABOLIC PANEL: CPT | Mod: HCNC

## 2025-05-29 PROCEDURE — 1159F MED LIST DOCD IN RCRD: CPT | Mod: CPTII,HCNC,S$GLB, | Performed by: INTERNAL MEDICINE

## 2025-05-29 PROCEDURE — 3074F SYST BP LT 130 MM HG: CPT | Mod: CPTII,HCNC,S$GLB, | Performed by: INTERNAL MEDICINE

## 2025-05-29 PROCEDURE — 99999 PR PBB SHADOW E&M-EST. PATIENT-LVL IV: CPT | Mod: PBBFAC,HCNC,, | Performed by: INTERNAL MEDICINE

## 2025-05-29 PROCEDURE — 3078F DIAST BP <80 MM HG: CPT | Mod: CPTII,HCNC,S$GLB, | Performed by: INTERNAL MEDICINE

## 2025-05-29 PROCEDURE — 1101F PT FALLS ASSESS-DOCD LE1/YR: CPT | Mod: CPTII,HCNC,S$GLB, | Performed by: INTERNAL MEDICINE

## 2025-05-30 ENCOUNTER — DOCUMENTATION ONLY (OUTPATIENT)
Dept: HEMATOLOGY/ONCOLOGY | Facility: CLINIC | Age: 75
End: 2025-05-30
Payer: MEDICARE

## 2025-05-30 NOTE — PROGRESS NOTES
Sw'er notified by in basket (Dr. TORRES Schuster MD) to arrange home hospice.    Sw'er [place] a [facility] [place] Downs dtr and patient has same ph# [phone] to discuss home hospice choices.  Sw'er spoke with Karl Downs, lora @ [phone] about notifying patient that Sw'er was trying to contact him regarding hospice arrangements.  Son said he will notify patient of message.    RHINA Boo, LMSW [facility] - Oncology Dept  Email:[email or web address]  Office ph# [phone]

## 2025-05-30 NOTE — PROGRESS NOTES
'er spoke with Carrie Downs, dtr @ [phone], to discuss referral to home hospice care recommended by Dr. Landen MD.  Carrie stated that she would prefer an [facility] affiliated hospice agency.  'er referred patient to [facility]. 'er faxed the referral to Sabra Leon, , ph# [phone]/F# [phone]. Isaac, lit's, confirmed receipt of referral.      RHINA Boo, LMSW [facility] - Oncology Dept  Email:[email or web address]  Office ph# [phone]

## (undated) DEVICE — COVER LIGHT HANDLE 80/CA

## (undated) DEVICE — SUT CTD VICRYL VIL BR SH 27

## (undated) DEVICE — SOL NACL 0.9% INJ PF/50151

## (undated) DEVICE — DRAPE CORETEMP FLD WRM 56X62IN

## (undated) DEVICE — SUT MONOCYRL 4-0 PS2 UND

## (undated) DEVICE — ELECTRODE REM PLYHSV RETURN 9

## (undated) DEVICE — DRAPE ABDOMINAL TIBURON 14X11

## (undated) DEVICE — TOWEL OR DISP STRL BLUE 4/PK

## (undated) DEVICE — TRAY MINOR GEN SURG

## (undated) DEVICE — POUCH SENSURA MIO 3/8X2 1/8IN

## (undated) DEVICE — TROCAR ENDOPATH XCEL 5X100MM

## (undated) DEVICE — SEE MEDLINE ITEM 146417

## (undated) DEVICE — STAPLER ECHELON PWR CIR 29MM

## (undated) DEVICE — DRAPE C ARM 42 X 120 10/BX

## (undated) DEVICE — STAPLER INT PROX TX 60X3.5MM

## (undated) DEVICE — BOWL STERILE LARGE 32OZ

## (undated) DEVICE — ADHESIVE DERMABOND ADVANCED

## (undated) DEVICE — DRAPE THYROID WITH ARMBOARD

## (undated) DEVICE — SUT MONOCRYL 4-0 PS-2

## (undated) DEVICE — SUT SILK 3-0 SH 18IN BLACK

## (undated) DEVICE — SUT 2-0 NYLON D/A

## (undated) DEVICE — SEE MEDLINE ITEM 156902

## (undated) DEVICE — SUT VICRYL PLUS 3-0 SH 18IN

## (undated) DEVICE — SUT VICRYL 3-0 27 SH

## (undated) DEVICE — TUBING HF INSUFFLATION W/ FLTR

## (undated) DEVICE — COVER MAYO STND XL 30X57IN

## (undated) DEVICE — GOWN SURGICAL X-LARGE

## (undated) DEVICE — IRRIGATOR ENDOSCOPY DISP.

## (undated) DEVICE — SOL NS 1000CC

## (undated) DEVICE — SUT CTD VICRYL 2-0 VIL BR

## (undated) DEVICE — TRAY CATH FOL SIL URIMTR 16FR

## (undated) DEVICE — SUT MONOCRYL 4-0 PS-1 UND

## (undated) DEVICE — INSTRUMENT LIGASURE ATLAS 37CM

## (undated) DEVICE — NDL BOX COUNTER

## (undated) DEVICE — TIP YANKAUERS BULB NO VENT